# Patient Record
Sex: MALE | Race: WHITE | NOT HISPANIC OR LATINO | Employment: OTHER | URBAN - NONMETROPOLITAN AREA
[De-identification: names, ages, dates, MRNs, and addresses within clinical notes are randomized per-mention and may not be internally consistent; named-entity substitution may affect disease eponyms.]

---

## 2017-01-26 ENCOUNTER — OFFICE VISIT (OUTPATIENT)
Dept: OTOLARYNGOLOGY | Facility: CLINIC | Age: 67
End: 2017-01-26

## 2017-01-26 VITALS
WEIGHT: 175.38 LBS | TEMPERATURE: 98.5 F | DIASTOLIC BLOOD PRESSURE: 75 MMHG | BODY MASS INDEX: 23.75 KG/M2 | HEART RATE: 64 BPM | HEIGHT: 72 IN | SYSTOLIC BLOOD PRESSURE: 168 MMHG

## 2017-01-26 DIAGNOSIS — L92.9 GRANULATION TISSUE: ICD-10-CM

## 2017-01-26 DIAGNOSIS — H69.83 ETD (EUSTACHIAN TUBE DYSFUNCTION), BILATERAL: Primary | ICD-10-CM

## 2017-01-26 DIAGNOSIS — H90.5 SNHL (SENSORINEURAL HEARING LOSS): ICD-10-CM

## 2017-01-26 DIAGNOSIS — H65.413 CHRONIC ALLERGIC OTITIS MEDIA OF BOTH EARS: ICD-10-CM

## 2017-01-26 DIAGNOSIS — H65.115 RECURRENT ACUTE ALLERGIC OTITIS MEDIA OF LEFT EAR: ICD-10-CM

## 2017-01-26 PROCEDURE — 99213 OFFICE O/P EST LOW 20 MIN: CPT | Performed by: NURSE PRACTITIONER

## 2017-01-26 RX ORDER — CIPROFLOXACIN AND DEXAMETHASONE 3; 1 MG/ML; MG/ML
3 SUSPENSION/ DROPS AURICULAR (OTIC) 3 TIMES DAILY
Qty: 7.5 ML | Refills: 0 | Status: SHIPPED | OUTPATIENT
Start: 2017-01-26 | End: 2017-02-15

## 2017-01-26 RX ORDER — AMOXICILLIN AND CLAVULANATE POTASSIUM 875; 125 MG/1; MG/1
1 TABLET, FILM COATED ORAL 2 TIMES DAILY
Qty: 20 TABLET | Refills: 0 | Status: SHIPPED | OUTPATIENT
Start: 2017-01-26 | End: 2017-02-05

## 2017-01-26 RX ORDER — CHOLECALCIFEROL (VITAMIN D3) 50 MCG
1 CAPSULE ORAL DAILY
COMMUNITY
End: 2020-07-15 | Stop reason: HOSPADM

## 2017-01-26 RX ORDER — ACETAMINOPHEN, ASPIRIN AND CAFFEINE 250; 250; 65 MG/1; MG/1; MG/1
TABLET, FILM COATED ORAL EVERY 8 HOURS
COMMUNITY
End: 2017-03-02

## 2017-01-26 RX ORDER — CETIRIZINE HYDROCHLORIDE 10 MG/1
TABLET ORAL
COMMUNITY
End: 2020-05-12

## 2017-01-26 RX ORDER — VALSARTAN 80 MG/1
160 TABLET ORAL DAILY
COMMUNITY
End: 2020-06-19

## 2017-01-26 RX ORDER — CLONIDINE HYDROCHLORIDE 0.2 MG/1
0.2 TABLET ORAL 3 TIMES DAILY
COMMUNITY
End: 2020-10-02

## 2017-01-26 RX ORDER — FLUTICASONE PROPIONATE 50 MCG
SPRAY, SUSPENSION (ML) NASAL
COMMUNITY
End: 2017-09-25 | Stop reason: SDUPTHER

## 2017-01-26 RX ORDER — ALBUTEROL SULFATE 90 UG/1
2 AEROSOL, METERED RESPIRATORY (INHALATION) 4 TIMES DAILY PRN
COMMUNITY
End: 2020-10-02

## 2017-01-26 RX ORDER — AZELASTINE 1 MG/ML
SPRAY, METERED NASAL 2 TIMES DAILY
COMMUNITY
End: 2017-03-02

## 2017-01-26 RX ORDER — AMLODIPINE BESYLATE 10 MG/1
10 TABLET ORAL DAILY
COMMUNITY
End: 2020-08-04

## 2017-01-26 NOTE — PATIENT INSTRUCTIONS
Medical vs surgical options discussed    Treat with meds.  Followup for possible PET.    Call for problems or worsening symptoms

## 2017-01-26 NOTE — MR AVS SNAPSHOT
Ricardo Hugo   1/26/2017 9:00 AM   Office Visit    Dept Phone:  [phone]   Encounter #:  [id number]    Provider:  STERLING Richardson   Department:  [facility]                Your Full Care Plan              Today's Medication Changes          These changes are accurate as of: 1/26/17  9:32 AM.  If you have any questions, ask your nurse or doctor.               New Medication(s)Ordered:     amoxicillin-clavulanate 875-125 MG per tablet   Commonly known as:  AUGMENTIN   Take 1 tablet by mouth 2 (Two) Times a Day for 10 days.   Started by:  STERLING Richardson       ciprofloxacin-dexamethasone 0.3-0.1 % otic suspension   Commonly known as:  CIPRODEX   Administer 3 drops into the left ear 3 (Three) Times a Day.   Started by:  STERLING Richardson            Where to Get Your Medications      These medications were sent to [facility] [address] [place] [address], [place] - [address] AT [facility] [address] - [phone] [facility] [phone]  [address], [place] [address]    Hours:  24-hours Phone:  [phone]     amoxicillin-clavulanate 875-125 MG per tablet    ciprofloxacin-dexamethasone 0.3-0.1 % otic suspension                  Your Updated Medication List          This list is accurate as of: 1/26/17  9:32 AM.  Always use your most recent med list.                albuterol 108 (90 BASE) MCG/ACT inhaler   Commonly known as:  PROVENTIL HFA;VENTOLIN HFA       amLODIPine 10 MG tablet   Commonly known as:  NORVASC       amoxicillin-clavulanate 875-125 MG per tablet   Commonly known as:  AUGMENTIN   Take 1 tablet by mouth 2 (Two) Times a Day for 10 days.       aspirin-acetaminophen-caffeine 250-250-65 MG per tablet   Commonly known as:  EXCEDRIN MIGRAINE       azelastine 0.1 % nasal spray   Commonly known as:  ASTELIN       cetirizine 10 MG tablet   Commonly known as:  zyrTEC       ciprofloxacin-dexamethasone 0.3-0.1 % otic suspension   Commonly known as:   "CIPRODEX   Administer 3 drops into the left ear 3 (Three) Times a Day.       CloNIDine 0.2 MG tablet   Commonly known as:  CATAPRES       fluticasone 50 MCG/ACT nasal spray   Commonly known as:  FLONASE       magnesium chloride  (64 MG) MG CR tablet   Commonly known as:  MAG-DELAY       MULTI VITAMIN PO       Omeprazole Magnesium 20.6 (20 BASE) MG capsule delayed-release       valsartan 80 MG tablet   Commonly known as:  DIOVAN               Instructions     None    Patient Instructions History      Upcoming Appointments     Visit Type Date Time Department    FOLLOW UP 2017  9:00 AM [facility]    [facility]      MyChart Signup     Our records indicate that you have declined [facility] signup. If you would like to sign up for [facility], please email [email or web address] or call [phone] to obtain an activation code.             Other Info from Your Visit           Your Appointments     Feb 15, 2017  9:45 AM CST   Follow Up with Nathan Chen MD   [facility] (--)    [address] [place] [address]   3 [facility] [address] [place] [id number] [phone]           Arrive 15 minutes prior to appointment.              Allergies     Hydrocodone      Lortab [Hydrocodone-acetaminophen]        Reason for Visit     Ear Problem           Vital Signs     Blood Pressure Pulse Temperature Height Weight Body Mass Index    168/75 64 98.5 °F (36.9 °C) 72\" (182.9 cm) 175 lb 6 oz (79.5 kg) 23.79 kg/m2    Smoking Status                   Former Smoker             "

## 2017-01-26 NOTE — PROGRESS NOTES
YOB: 1950  Location: [facility]  Location Address: [address] [place] [address], [facility], [address] [place] [address]  Location Phone: [phone]    Chief Complaint   Patient presents with   • Ear Problem       History of Present Illness  Ricardo Hugo is a 66 y.o. male.  Ricardo Hugo is here for follow up of ENT complaints. The patient has had problems with otorrhea, chronic fluid on the ear and decreased hearing  The symptoms are localized to the left ear. The patient has had severe symptoms. The symptoms have been present for the last several weeks . The symptoms are aggravated by  no identifiable factors . The symptoms are improved by no identifieable factors. S/p left PET.  He has had a cold. He is on Flonase and Astelin. He has had left ear drainage.  He wants a tube back in his ear asap.       Past Medical History   Diagnosis Date   • Allergic rhinitis    • Chronic mucoid otitis media    • Chronic rhinitis    • Chronic sinusitis    • Eustachian tube dysfunction    • Heart disease    • Hypertension    • Mixed hearing loss of left ear    • Sensorineural hearing loss    • Tinnitus        Past Surgical History   Procedure Laterality Date   • Coronary artery bypass graft     • Total hip arthroplasty     • Myringotomy  06/10/2016   • Tonsillectomy           Current Outpatient Prescriptions:   •  albuterol (PROVENTIL HFA;VENTOLIN HFA) 108 (90 BASE) MCG/ACT inhaler, Every 6 (Six) Hours., Disp: , Rfl:   •  amLODIPine (NORVASC) 10 MG tablet, Take 10 mg by mouth daily, Disp: , Rfl:   •  aspirin-acetaminophen-caffeine (EXCEDRIN MIGRAINE) 250-250-65 MG per tablet, Every 8 (Eight) Hours., Disp: , Rfl:   •  azelastine (ASTELIN) 0.1 % nasal spray, 2 (Two) Times a Day., Disp: , Rfl:   •  cetirizine (zyrTEC) 10 MG tablet, Take 10 mg by mouth daily, Disp: , Rfl:   •  CloNIDine (CATAPRES) 0.2 MG tablet, Take 0.2 mg by mouth 3 times daily, Disp: , Rfl:   •  fluticasone (FLONASE) 50 MCG/ACT nasal spray, 1 spray by Nasal  route daily, Disp: , Rfl:   •  magnesium chloride ER (MAG-DELAY) 535 (64 MG) MG CR tablet, Take 64 mg by mouth daily, Disp: , Rfl:   •  Multiple Vitamin (MULTI VITAMIN PO), Take 5 mg by mouth daily, Disp: , Rfl:   •  Omeprazole Magnesium 20.6 (20 BASE) MG capsule delayed-release, Take by mouth daily, Disp: , Rfl:   •  valsartan (DIOVAN) 80 MG tablet, Take 80 mg by mouth daily, Disp: , Rfl:   •  amoxicillin-clavulanate (AUGMENTIN) 875-125 MG per tablet, Take 1 tablet by mouth 2 (Two) Times a Day for 10 days., Disp: 20 tablet, Rfl: 0  •  ciprofloxacin-dexamethasone (CIPRODEX) 0.3-0.1 % otic suspension, Administer 3 drops into the left ear 3 (Three) Times a Day., Disp: 7.5 mL, Rfl: 0    Hydrocodone and Lortab [hydrocodone-acetaminophen]    Family History   Problem Relation Age of Onset   • Diabetes Other        Social History     Social History   • Marital status:      Spouse name: N/A   • Number of children: N/A   • Years of education: N/A     Occupational History   • Not on file.     Social History Main Topics   • Smoking status: Former Smoker   • Smokeless tobacco: Not on file      Comment: quit 2010   • Alcohol use Yes      Comment: 2 beers daily   • Drug use: Defer   • Sexual activity: Not on file     Other Topics Concern   • Not on file     Social History Narrative       Review of Systems   Constitutional: Negative.    HENT:        SEE HPI   Eyes: Negative.    Respiratory: Negative.    Cardiovascular: Negative.    Gastrointestinal: Negative.    Endocrine: Negative.    Genitourinary: Negative.    Musculoskeletal: Negative.    Skin: Negative.    Allergic/Immunologic: Negative.    Neurological: Negative.    Hematological: Negative.    Psychiatric/Behavioral: Negative.        Vitals:    01/26/17 0910   BP: 168/75   Pulse: 64   Temp: 98.5 °F (36.9 °C)       Objective     Physical Exam  CONSTITUTIONAL: well nourished, alert, oriented, in no acute distress     COMMUNICATION AND VOICE: able to communicate  normally, normal voice quality    HEAD: normocephalic, no lesions, atraumatic, no tenderness, no masses     FACE: appearance normal, no lesions, no tenderness, no deformities, facial motion symmetric    EYES: ocular motility normal, eyelids normal, orbits normal, no proptosis, conjunctiva normal , pupils equal, round     EARS:  Hearing: response to conversational voice normal bilaterally   External Ears: auricles without lesions  Otoscopic: left TM dull with effusion, tympanogram positive pressure, left EAC with extruded tube/debris removed with suction, left EAC with posterior granulation approx 2 mm, right TM dull negative pressure, right EAC normal to inspection    NOSE:  External Nose: structure normal, no tenderness on palpation, no nasal discharge, no lesions, no evidence of trauma, nostrils patent   Intranasal Exam: nasal mucosa normal, vestibule within normal limits, inferior turbinate normal, nasal septum midline     ORAL:  Lips: upper and lower lips without lesion        CHEST/RESPIRATORY: respiratory effort normal  CARDIOVASCULAR: extremities without cyanosis or edema      NEUROLOGIC/PSYCHIATRIC: oriented to time, place and person, mood normal, affect appropriate, CN II-XII intact grossly    Assessment/Plan     * Surgery not found *  No orders of the defined types were placed in this encounter.    Return in about 3 weeks (around 2/16/2017).       Patient Instructions   Medical vs surgical options discussed    Treat with meds.  Followup for possible PET.    Call for problems or worsening symptoms

## 2017-02-15 ENCOUNTER — OFFICE VISIT (OUTPATIENT)
Dept: OTOLARYNGOLOGY | Facility: CLINIC | Age: 67
End: 2017-02-15

## 2017-02-15 VITALS
DIASTOLIC BLOOD PRESSURE: 78 MMHG | BODY MASS INDEX: 23.16 KG/M2 | WEIGHT: 171 LBS | SYSTOLIC BLOOD PRESSURE: 142 MMHG | HEIGHT: 72 IN | HEART RATE: 62 BPM | TEMPERATURE: 98 F

## 2017-02-15 DIAGNOSIS — H69.83 EUSTACHIAN TUBE DYSFUNCTION, BILATERAL: ICD-10-CM

## 2017-02-15 DIAGNOSIS — J31.0 CHRONIC RHINITIS: Primary | ICD-10-CM

## 2017-02-15 PROBLEM — H69.90 EUSTACHIAN TUBE DYSFUNCTION: Status: ACTIVE | Noted: 2017-02-15

## 2017-02-15 PROBLEM — H69.80 EUSTACHIAN TUBE DYSFUNCTION: Status: ACTIVE | Noted: 2017-02-15

## 2017-02-15 PROCEDURE — 99213 OFFICE O/P EST LOW 20 MIN: CPT | Performed by: OTOLARYNGOLOGY

## 2017-02-15 RX ORDER — VIT C/E/CUPERIC/ZINC/LUTEIN 226-90-0.8
1 CAPSULE ORAL 2 TIMES DAILY
COMMUNITY

## 2017-02-15 NOTE — PROGRESS NOTES
PRIMARY CARE PROVIDER: Joe Velasco MD  REFERRING PROVIDER: No ref. provider found    Chief Complaint   Patient presents with   • Follow-up     Left ear pressure/fullness/ringing       Subjective   History of Present Illness:  Ricardo Hugo is a  66 y.o.  male who presents for follow up. He reports improvement of symptoms.     Review of Systems:  Review of Systems    Past History:  Past Medical History   Diagnosis Date   • Allergic rhinitis    • Chronic mucoid otitis media    • Chronic rhinitis    • Chronic sinusitis    • Eustachian tube dysfunction    • Heart disease    • Hypertension    • Mixed hearing loss of left ear    • Sensorineural hearing loss    • Tinnitus      Past Surgical History   Procedure Laterality Date   • Coronary artery bypass graft     • Total hip arthroplasty     • Myringotomy  06/10/2016   • Tonsillectomy       Family History   Problem Relation Age of Onset   • Diabetes Other      Social History   Substance Use Topics   • Smoking status: Former Smoker   • Smokeless tobacco: None      Comment: quit 2010   • Alcohol use Yes      Comment: 2 beers daily       Current Outpatient Prescriptions:   •  albuterol (PROVENTIL HFA;VENTOLIN HFA) 108 (90 BASE) MCG/ACT inhaler, Every 6 (Six) Hours., Disp: , Rfl:   •  amLODIPine (NORVASC) 10 MG tablet, Take 10 mg by mouth daily, Disp: , Rfl:   •  aspirin-acetaminophen-caffeine (EXCEDRIN MIGRAINE) 250-250-65 MG per tablet, Every 8 (Eight) Hours., Disp: , Rfl:   •  azelastine (ASTELIN) 0.1 % nasal spray, 2 (Two) Times a Day., Disp: , Rfl:   •  cetirizine (zyrTEC) 10 MG tablet, Take 10 mg by mouth daily, Disp: , Rfl:   •  CloNIDine (CATAPRES) 0.2 MG tablet, Take 0.2 mg by mouth 3 times daily, Disp: , Rfl:   •  fluticasone (FLONASE) 50 MCG/ACT nasal spray, 1 spray by Nasal route daily, Disp: , Rfl:   •  magnesium chloride ER (MAG-DELAY) 535 (64 MG) MG CR tablet, Take 64 mg by mouth daily, Disp: , Rfl:   •  Multiple Vitamins-Minerals (PRESERVISION/LUTEIN PO),  Take  by mouth., Disp: , Rfl:   •  Omeprazole Magnesium 20.6 (20 BASE) MG capsule delayed-release, Take by mouth daily, Disp: , Rfl:   •  valsartan (DIOVAN) 80 MG tablet, Take 80 mg by mouth daily, Disp: , Rfl:   Allergies:  Hydrocodone and Lortab [hydrocodone-acetaminophen]    Objective     Vital Signs:  Temp:  [98 °F (36.7 °C)] 98 °F (36.7 °C)  Heart Rate:  [62] 62  BP: (142)/(78) 142/78    Physical Exam:  CONSTITUTIONAL: well nourished, well-developed, alert, oriented, in no acute distress   COMMUNICATION AND VOICE: able to communicate normally for age, normal voice quality  HEAD: normocephalic, no lesions, atraumatic, no tenderness, no masses   FACE: appearance normal, no lesions, no tenderness, no deformities, facial motion symmetric  EYES: ocular motility normal, eyelids normal, orbits normal, no proptosis, conjunctiva normal , pupils equal, round   EARS:  Hearing: response to conversational voice normal bilaterally   External Ears: auricles without lesions  Otoscopic Exam:   EXTERNAL CANAL: normal ear canal without stenosis or significant cerumen   TYMPANIC MEMBRANE:  tympanic membrane appearance normal, no lesions, no perforation, normal mobility, no fluid  NOSE:  External Nose: structure normal, no tenderness on palpation, no nasal discharge, no lesions, no evidence of trauma, nostrils patent   Intranasal exam: nasal mucosa with mucosal congestion and erythema, nasal septum relatively midline   ORAL:  Lips: upper and lower lips without lesion   NECK: neck appearance normal  CHEST/RESPIRATORY: respiratory effort normal, normal chest excursion  CARDIOVASCULAR: extremities without cyanosis or edema   NEUROLOGIC/PSYCHIATRIC: oriented appropriately, mood normal, affect appropriate, CN II-XII intact grossly      Results Review:   none    Assessment   No diagnosis found.    Plan   Medical and surgical options were discussed including medical management vs tube placement. Risks, benefits and alternatives were  discussed and questions were answered. After considering the options, the patient decided to proceed with medical management  Continue current management plan.  -------MEDICATIONS:-------  continue previous medication as prescribed  -----INSTRUCTIONS-----  Call if pressure resumes- if so will consider tube placement    No Follow-up on file.    Nathan Chen MD  02/15/17  10:06 AM

## 2017-03-02 ENCOUNTER — APPOINTMENT (OUTPATIENT)
Dept: PREADMISSION TESTING | Facility: HOSPITAL | Age: 67
End: 2017-03-02

## 2017-03-02 VITALS
OXYGEN SATURATION: 99 % | DIASTOLIC BLOOD PRESSURE: 78 MMHG | BODY MASS INDEX: 23.19 KG/M2 | SYSTOLIC BLOOD PRESSURE: 140 MMHG | RESPIRATION RATE: 20 BRPM | HEART RATE: 65 BPM | WEIGHT: 175 LBS | HEIGHT: 73 IN

## 2017-03-02 LAB
ALBUMIN SERPL-MCNC: 4 G/DL (ref 3.5–5)
ALBUMIN/GLOB SERPL: 1.1 G/DL (ref 1.1–2.5)
ALP SERPL-CCNC: 120 U/L (ref 24–120)
ALT SERPL W P-5'-P-CCNC: 19 U/L (ref 0–54)
ANION GAP SERPL CALCULATED.3IONS-SCNC: 9 MMOL/L (ref 4–13)
AST SERPL-CCNC: 28 U/L (ref 7–45)
BASOPHILS # BLD AUTO: 0.03 10*3/MM3 (ref 0–0.2)
BASOPHILS NFR BLD AUTO: 0.3 % (ref 0–2)
BILIRUB SERPL-MCNC: 0.7 MG/DL (ref 0.1–1)
BUN BLD-MCNC: 13 MG/DL (ref 5–21)
BUN/CREAT SERPL: 16.5 (ref 7–25)
CALCIUM SPEC-SCNC: 9.7 MG/DL (ref 8.4–10.4)
CHLORIDE SERPL-SCNC: 101 MMOL/L (ref 98–110)
CO2 SERPL-SCNC: 28 MMOL/L (ref 24–31)
CREAT BLD-MCNC: 0.79 MG/DL (ref 0.5–1.4)
DEPRECATED RDW RBC AUTO: 44.4 FL (ref 40–54)
EOSINOPHIL # BLD AUTO: 0.27 10*3/MM3 (ref 0–0.7)
EOSINOPHIL NFR BLD AUTO: 3 % (ref 0–4)
ERYTHROCYTE [DISTWIDTH] IN BLOOD BY AUTOMATED COUNT: 12.8 % (ref 12–15)
GFR SERPL CREATININE-BSD FRML MDRD: 97 ML/MIN/1.73
GLOBULIN UR ELPH-MCNC: 3.5 GM/DL
GLUCOSE BLD-MCNC: 103 MG/DL (ref 70–100)
HCT VFR BLD AUTO: 39 % (ref 40–52)
HGB BLD-MCNC: 13.6 G/DL (ref 14–18)
IMM GRANULOCYTES # BLD: 0.03 10*3/MM3 (ref 0–0.03)
IMM GRANULOCYTES NFR BLD: 0.3 % (ref 0–5)
LYMPHOCYTES # BLD AUTO: 1.31 10*3/MM3 (ref 0.72–4.86)
LYMPHOCYTES NFR BLD AUTO: 14.5 % (ref 15–45)
MCH RBC QN AUTO: 33.3 PG (ref 28–32)
MCHC RBC AUTO-ENTMCNC: 34.9 G/DL (ref 33–36)
MCV RBC AUTO: 95.4 FL (ref 82–95)
MONOCYTES # BLD AUTO: 0.97 10*3/MM3 (ref 0.19–1.3)
MONOCYTES NFR BLD AUTO: 10.7 % (ref 4–12)
NEUTROPHILS # BLD AUTO: 6.43 10*3/MM3 (ref 1.87–8.4)
NEUTROPHILS NFR BLD AUTO: 71.2 % (ref 39–78)
NRBC BLD MANUAL-RTO: 0 /100 WBC (ref 0–0)
PLATELET # BLD AUTO: 122 10*3/MM3 (ref 130–400)
PMV BLD AUTO: 10.2 FL (ref 6–12)
POTASSIUM BLD-SCNC: 4.8 MMOL/L (ref 3.5–5.3)
PROT SERPL-MCNC: 7.5 G/DL (ref 6.3–8.7)
RBC # BLD AUTO: 4.09 10*6/MM3 (ref 4.8–5.9)
SODIUM BLD-SCNC: 138 MMOL/L (ref 135–145)
WBC NRBC COR # BLD: 9.04 10*3/MM3 (ref 4.8–10.8)

## 2017-03-02 PROCEDURE — 85025 COMPLETE CBC W/AUTO DIFF WBC: CPT | Performed by: SPECIALIST

## 2017-03-02 PROCEDURE — 36415 COLL VENOUS BLD VENIPUNCTURE: CPT

## 2017-03-02 PROCEDURE — 93010 ELECTROCARDIOGRAM REPORT: CPT | Performed by: INTERNAL MEDICINE

## 2017-03-02 PROCEDURE — 93005 ELECTROCARDIOGRAM TRACING: CPT

## 2017-03-02 PROCEDURE — 80053 COMPREHEN METABOLIC PANEL: CPT | Performed by: SPECIALIST

## 2017-03-02 NOTE — DISCHARGE INSTRUCTIONS
DAY OF SURGERY INSTRUCTIONS        YOUR SURGEON: ***APRIL SUSAN    PROCEDURE: ***JEET ANAL ABSCESS    DATE OF SURGERY: ***03/03/2017    ARRIVAL TIME: AS DIRECTED BY OFFICE    DAY OF SURGERY TAKE ONLY THESE MEDICATIONS: ***NORVASC            BEFORE YOU COME TO THE HOSPITAL  (Pre-op instructions)  • Do not eat, drink, smoke or chew gum after midnight the night before surgery.  This also includes no mints.  • Morning of surgery take only the medicines you have been instructed with a sip of water unless otherwise instructed  by your physician.  • Do not shave, wear makeup or dark nail polish.  • Remove all jewelry including rings.  • Leave anything you consider valuable at home.  • Leave your suitcase in the car until after your surgery.  • Bring the following with you if applicable:  o Picture ID and insurance, Medicare or Medicaid cards  o Co-pay/deductible required by insurance (cash, check, credit card)  o Medications (no narcotics) in original bottles (not a list) including all over-the-counter medications.  o Copy of advance directive, living will or power-of- documents if not brought to PAT  o CPAP or BIPAP mask and tubing  o Skin prep instruction sheet  o Relaxation aids (MP3 player, book, magazine)  • Confirm your arrival time with you surgeon the day before your surgery (surgery times are subject to change)  • On the day of surgery check in at registration located at the main entrance of the hospital.       Outpatient Surgery Guidelines, Adult  Outpatient procedures are those for which the person having the procedure is allowed to go home the same day as the procedure. Various procedures are done on an outpatient basis. You should follow some general guidelines if you will be having an outpatient procedure.  LET YOUR HEALTH CARE PROVIDER KNOW ABOUT:  · Any allergies you have.  · All medicines you are taking, including vitamins, herbs, eye drops, creams, and over-the-counter medicines.  · Previous  problems you or members of your family have had with the use of anesthetics.  · Any blood disorders you have.  · Previous surgeries you have had.  · Medical conditions you have.  RISKS AND COMPLICATIONS  Your health care provider will discuss possible risks and complications with you before surgery. Common risks and complications include:    · Problems due to the use of anesthetics.  · Blood loss and replacement (does not apply to minor surgical procedures).  · Temporary increase in pain due to surgery.  · Uncorrected pain or problems that the surgery was meant to correct.  · Infection.  · New damage.  BEFORE THE PROCEDURE  · Ask your health care provider about changing or stopping your regular medicines. You may need to stop taking certain medicines in the days or weeks before the procedure.  · Stop smoking at least 2 weeks before surgery. This lowers your risk for complications during and after surgery. Ask your health care provider for help with this if needed.  · Eat your usual meals and a light supper the day before surgery. Continue fluid intake. Do not drink alcohol.  · Do not eat or drink after midnight the night before your surgery.   · Arrange for someone to take you home and to stay with you for 24 hours after the procedure. Medicine given for your procedure may affect your ability to drive or to care for yourself.  · Call your health care provider's office if you develop an illness or problem that may prevent you from safely having your procedure.  AFTER THE PROCEDURE  After surgery, you will be taken to a recovery area, where your progress will be monitored. If there are no complications, you will be allowed to go home when you are awake, stable, and taking fluids well. You may have numbness around the surgical site. Healing will take some time. You will have tenderness at the surgical site and may have some swelling and bruising. You may also have some nausea.  HOME CARE INSTRUCTIONS  · Do not drive  for 24 hours, or as directed by your health care provider. Do not drive while taking prescription pain medicines.  · Do not drink alcohol for 24 hours.  · Do not make important decisions or sign legal documents for 24 hours.  · You may resume a normal diet and activities as directed.  · Do not lift anything heavier than 10 pounds (4.5 kg) or play contact sports until your health care provider says it is okay.  · Change your bandages (dressings) as directed.  · Only take over-the-counter or prescription medicines as directed by your health care provider.  · Follow up with your health care provider as directed.  SEEK MEDICAL CARE IF:  · You have increased bleeding (more than a small spot) from the surgical site.  · You have redness, swelling, or increasing pain in the wound.  · You see pus coming from the wound.  · You have a fever.  · You notice a bad smell coming from the wound or dressing.  · You feel lightheaded or faint.  · You develop a rash.  · You have trouble breathing.  · You develop allergies.  MAKE SURE YOU:  · Understand these instructions.  · Will watch your condition.  · Will get help right away if you are not doing well or get worse.     This information is not intended to replace advice given to you by your health care provider. Make sure you discuss any questions you have with your health care provider.     Document Released: 09/12/2002 Document Revised: 05/03/2016 Document Reviewed: 05/22/2014  [employer] Interactive Patient Education ©2016 [employer]       Fall Prevention in Hospitals, Adult  As a hospital patient, your condition and the treatments you receive can increase your risk for falls. Some additional risk factors for falls in a hospital include:  · Being in an unfamiliar environment.  · Being on bed rest.  · Your surgery.  · Taking certain medicines.  · Your tubing requirements, such as intravenous (IV) therapy or catheters.  It is important that you learn how to decrease fall risks while  at the hospital. Below are important tips that can help prevent falls.  SAFETY TIPS FOR PREVENTING FALLS  Talk about your risk of falling.  · Ask your health care provider why you are at risk for falling. Is it your medicine, illness, tubing placement, or something else?  · Make a plan with your health care provider to keep you safe from falls.  · Ask your health care provider or pharmacist about side effects of your medicines. Some medicines can make you dizzy or affect your coordination.  Ask for help.  · Ask for help before getting out of bed. You may need to press your call button.  · Ask for assistance in getting safely to the toilet.  · Ask for a walker or cane to be put at your bedside. Ask that most of the side rails on your bed be placed up before your health care provider leaves the room.  · Ask family or friends to sit with you.  · Ask for things that are out of your reach, such as your glasses, hearing aids, telephone, bedside table, or call button.  Follow these tips to avoid falling:  · Stay lying or seated, rather than standing, while waiting for help.  · Wear rubber-soled slippers or shoes whenever you walk in the hospital.  · Avoid quick, sudden movements.  ¨ Change positions slowly.  ¨ Sit on the side of your bed before standing.  ¨ Stand up slowly and wait before you start to walk.  · Let your health care provider know if there is a spill on the floor.  · Pay careful attention to the medical equipment, electrical cords, and tubes around you.  · When you need help, use your call button by your bed or in the bathroom. Wait for one of your health care providers to help you.  · If you feel dizzy or unsure of your footing, return to bed and wait for assistance.  · Avoid being distracted by the TV, telephone, or another person in your room.  · Do not lean or support yourself on rolling objects, such as IV poles or bedside tables.     This information is not intended to replace advice given to you by  your health care provider. Make sure you discuss any questions you have with your health care provider.     Document Released: 12/15/2001 Document Revised: 01/08/2016 Document Reviewed: 08/25/2013  [employer] Interactive Patient Education ©2016 [employer]       Surgical Site Infections FAQs  What is a Surgical Site Infection (SSI)?  A surgical site infection is an infection that occurs after surgery in the part of the body where the surgery took place. Most patients who have surgery do not develop an infection. However, infections develop in about 1 to 3 out of every 100 patients who have surgery.  Some of the common symptoms of a surgical site infection are:  · Redness and pain around the area where you had surgery  · Drainage of cloudy fluid from your surgical wound  · Fever  Can SSIs be treated?  Yes. Most surgical site infections can be treated with antibiotics. The antibiotic given to you depends on the bacteria (germs) causing the infection. Sometimes patients with SSIs also need another surgery to treat the infection.  What are some of the things that hospitals are doing to prevent SSIs?  To prevent SSIs, doctors, nurses, and other healthcare providers:  · Clean their hands and arms up to their elbows with an antiseptic agent just before the surgery.  · Clean their hands with soap and water or an alcohol-based hand rub before and after caring for each patient.  · May remove some of your hair immediately before your surgery using electric clippers if the hair is in the same area where the procedure will occur. They should not shave you with a razor.  · Wear special hair covers, masks, gowns, and gloves during surgery to keep the surgery area clean.  · Give you antibiotics before your surgery starts. In most cases, you should get antibiotics within 60 minutes before the surgery starts and the antibiotics should be stopped within 24 hours after surgery.  · Clean the skin at the site of your surgery with a  special soap that kills germs.  What can I do to help prevent SSIs?  Before your surgery:  · Tell your doctor about other medical problems you may have. Health problems such as allergies, diabetes, and obesity could affect your surgery and your treatment.  · Quit smoking. Patients who smoke get more infections. Talk to your doctor about how you can quit before your surgery.  · Do not shave near where you will have surgery. Shaving with a razor can irritate your skin and make it easier to develop an infection.  At the time of your surgery:  · Speak up if someone tries to shave you with a razor before surgery. Ask why you need to be shaved and talk with your surgeon if you have any concerns.  · Ask if you will get antibiotics before surgery.  After your surgery:  · Make sure that your healthcare providers clean their hands before examining you, either with soap and water or an alcohol-based hand rub.  · If you do not see your providers clean their hands, please ask them to do so.  · Family and friends who visit you should not touch the surgical wound or dressings.  · Family and friends should clean their hands with soap and water or an alcohol-based hand rub before and after visiting you. If you do not see them clean their hands, ask them to clean their hands.  What do I need to do when I go home from the hospital?  · Before you go home, your doctor or nurse should explain everything you need to know about taking care of your wound. Make sure you understand how to care for your wound before you leave the hospital.  · Always clean your hands before and after caring for your wound.  · Before you go home, make sure you know who to contact if you have questions or problems after you get home.  · If you have any symptoms of an infection, such as redness and pain at the surgery site, drainage, or fever, call your doctor immediately.  If you have additional questions, please ask your doctor or nurse.  Developed and  co-sponsored by The Society for Healthcare Epidemiology of [place] (SHEA); Infectious Diseases Society of [place] (IDSA); American Hospital Association; Association for Professionals in Infection Control and Epidemiology (APIC); Centers for Disease Control and Prevention (CDC); and The Joint Commission.     This information is not intended to replace advice given to you by your health care provider. Make sure you discuss any questions you have with your health care provider.     Document Released: 12/23/2014 Document Revised: 01/08/2016 Document Reviewed: 03/02/2016  [employer] Interactive Patient Education ©2016 [employer]     PATIENT/FAMILY/RESPONSIBLE PARTY VERBALIZES UNDERSTANDING OF ABOVE EDUCATION

## 2017-03-03 ENCOUNTER — ANESTHESIA EVENT (OUTPATIENT)
Dept: PERIOP | Facility: HOSPITAL | Age: 67
End: 2017-03-03

## 2017-03-03 ENCOUNTER — HOSPITAL ENCOUNTER (OUTPATIENT)
Facility: HOSPITAL | Age: 67
Setting detail: HOSPITAL OUTPATIENT SURGERY
Discharge: HOME | End: 2017-03-03
Attending: SPECIALIST | Admitting: SPECIALIST

## 2017-03-03 ENCOUNTER — ANESTHESIA (OUTPATIENT)
Dept: PERIOP | Facility: HOSPITAL | Age: 67
End: 2017-03-03

## 2017-03-03 VITALS
DIASTOLIC BLOOD PRESSURE: 80 MMHG | SYSTOLIC BLOOD PRESSURE: 107 MMHG | HEART RATE: 63 BPM | OXYGEN SATURATION: 98 % | TEMPERATURE: 97.2 F | RESPIRATION RATE: 16 BRPM

## 2017-03-03 PROCEDURE — 25010000002 SUCCINYLCHOLINE PER 20 MG: Performed by: NURSE ANESTHETIST, CERTIFIED REGISTERED

## 2017-03-03 PROCEDURE — 25010000002 DEXAMETHASONE PER 1 MG: Performed by: NURSE ANESTHETIST, CERTIFIED REGISTERED

## 2017-03-03 PROCEDURE — 25010000002 FENTANYL CITRATE (PF) 250 MCG/5ML SOLUTION: Performed by: NURSE ANESTHETIST, CERTIFIED REGISTERED

## 2017-03-03 PROCEDURE — 25010000002 MIDAZOLAM PER 1 MG: Performed by: ANESTHESIOLOGY

## 2017-03-03 PROCEDURE — 25010000002 ONDANSETRON PER 1 MG: Performed by: NURSE ANESTHETIST, CERTIFIED REGISTERED

## 2017-03-03 PROCEDURE — 25010000002 PROPOFOL 10 MG/ML EMULSION: Performed by: NURSE ANESTHETIST, CERTIFIED REGISTERED

## 2017-03-03 RX ORDER — ULTRASOUND COUPLING MEDIUM
GEL (GRAM) TOPICAL AS NEEDED
Status: DISCONTINUED | OUTPATIENT
Start: 2017-03-03 | End: 2017-03-03 | Stop reason: HOSPADM

## 2017-03-03 RX ORDER — IPRATROPIUM BROMIDE AND ALBUTEROL SULFATE 2.5; .5 MG/3ML; MG/3ML
3 SOLUTION RESPIRATORY (INHALATION) ONCE AS NEEDED
Status: DISCONTINUED | OUTPATIENT
Start: 2017-03-03 | End: 2017-03-03 | Stop reason: HOSPADM

## 2017-03-03 RX ORDER — SODIUM CHLORIDE 0.9 % (FLUSH) 0.9 %
1-10 SYRINGE (ML) INJECTION AS NEEDED
Status: DISCONTINUED | OUTPATIENT
Start: 2017-03-03 | End: 2017-03-03 | Stop reason: HOSPADM

## 2017-03-03 RX ORDER — DEXAMETHASONE SODIUM PHOSPHATE 4 MG/ML
INJECTION, SOLUTION INTRA-ARTICULAR; INTRALESIONAL; INTRAMUSCULAR; INTRAVENOUS; SOFT TISSUE AS NEEDED
Status: DISCONTINUED | OUTPATIENT
Start: 2017-03-03 | End: 2017-03-03 | Stop reason: SURG

## 2017-03-03 RX ORDER — MIDAZOLAM HYDROCHLORIDE 1 MG/ML
2 INJECTION INTRAMUSCULAR; INTRAVENOUS
Status: DISCONTINUED | OUTPATIENT
Start: 2017-03-03 | End: 2017-03-03 | Stop reason: HOSPADM

## 2017-03-03 RX ORDER — BUPIVACAINE HYDROCHLORIDE AND EPINEPHRINE 2.5; 5 MG/ML; UG/ML
INJECTION, SOLUTION INFILTRATION; PERINEURAL AS NEEDED
Status: DISCONTINUED | OUTPATIENT
Start: 2017-03-03 | End: 2017-03-03 | Stop reason: HOSPADM

## 2017-03-03 RX ORDER — LIDOCAINE HYDROCHLORIDE 20 MG/ML
INJECTION, SOLUTION INFILTRATION; PERINEURAL AS NEEDED
Status: DISCONTINUED | OUTPATIENT
Start: 2017-03-03 | End: 2017-03-03 | Stop reason: SURG

## 2017-03-03 RX ORDER — OXYCODONE HYDROCHLORIDE AND ACETAMINOPHEN 5; 325 MG/1; MG/1
1 TABLET ORAL EVERY 4 HOURS PRN
Status: CANCELLED | OUTPATIENT
Start: 2017-03-03 | End: 2017-03-13

## 2017-03-03 RX ORDER — ONDANSETRON 2 MG/ML
4 INJECTION INTRAMUSCULAR; INTRAVENOUS AS NEEDED
Status: DISCONTINUED | OUTPATIENT
Start: 2017-03-03 | End: 2017-03-03 | Stop reason: HOSPADM

## 2017-03-03 RX ORDER — METOCLOPRAMIDE HYDROCHLORIDE 5 MG/ML
5 INJECTION INTRAMUSCULAR; INTRAVENOUS
Status: DISCONTINUED | OUTPATIENT
Start: 2017-03-03 | End: 2017-03-03 | Stop reason: HOSPADM

## 2017-03-03 RX ORDER — NALOXONE HCL 0.4 MG/ML
0.04 VIAL (ML) INJECTION AS NEEDED
Status: DISCONTINUED | OUTPATIENT
Start: 2017-03-03 | End: 2017-03-03 | Stop reason: HOSPADM

## 2017-03-03 RX ORDER — MEPERIDINE HYDROCHLORIDE 25 MG/ML
12.5 INJECTION INTRAMUSCULAR; INTRAVENOUS; SUBCUTANEOUS
Status: DISCONTINUED | OUTPATIENT
Start: 2017-03-03 | End: 2017-03-03 | Stop reason: HOSPADM

## 2017-03-03 RX ORDER — SUCCINYLCHOLINE CHLORIDE 20 MG/ML
INJECTION INTRAMUSCULAR; INTRAVENOUS AS NEEDED
Status: DISCONTINUED | OUTPATIENT
Start: 2017-03-03 | End: 2017-03-03 | Stop reason: SURG

## 2017-03-03 RX ORDER — MAGNESIUM HYDROXIDE 1200 MG/15ML
LIQUID ORAL AS NEEDED
Status: DISCONTINUED | OUTPATIENT
Start: 2017-03-03 | End: 2017-03-03 | Stop reason: HOSPADM

## 2017-03-03 RX ORDER — SODIUM CHLORIDE, SODIUM LACTATE, POTASSIUM CHLORIDE, CALCIUM CHLORIDE 600; 310; 30; 20 MG/100ML; MG/100ML; MG/100ML; MG/100ML
100 INJECTION, SOLUTION INTRAVENOUS CONTINUOUS
Status: DISCONTINUED | OUTPATIENT
Start: 2017-03-03 | End: 2017-03-03 | Stop reason: HOSPADM

## 2017-03-03 RX ORDER — ROCURONIUM BROMIDE 10 MG/ML
INJECTION, SOLUTION INTRAVENOUS AS NEEDED
Status: DISCONTINUED | OUTPATIENT
Start: 2017-03-03 | End: 2017-03-03 | Stop reason: SURG

## 2017-03-03 RX ORDER — MIDAZOLAM HYDROCHLORIDE 1 MG/ML
1 INJECTION INTRAMUSCULAR; INTRAVENOUS
Status: DISCONTINUED | OUTPATIENT
Start: 2017-03-03 | End: 2017-03-03 | Stop reason: HOSPADM

## 2017-03-03 RX ORDER — ONDANSETRON 2 MG/ML
INJECTION INTRAMUSCULAR; INTRAVENOUS AS NEEDED
Status: DISCONTINUED | OUTPATIENT
Start: 2017-03-03 | End: 2017-03-03 | Stop reason: SURG

## 2017-03-03 RX ORDER — MORPHINE SULFATE 2 MG/ML
2 INJECTION, SOLUTION INTRAMUSCULAR; INTRAVENOUS AS NEEDED
Status: DISCONTINUED | OUTPATIENT
Start: 2017-03-03 | End: 2017-03-03 | Stop reason: HOSPADM

## 2017-03-03 RX ORDER — PROPOFOL 10 MG/ML
VIAL (ML) INTRAVENOUS AS NEEDED
Status: DISCONTINUED | OUTPATIENT
Start: 2017-03-03 | End: 2017-03-03 | Stop reason: SURG

## 2017-03-03 RX ORDER — LABETALOL HYDROCHLORIDE 5 MG/ML
5 INJECTION, SOLUTION INTRAVENOUS
Status: DISCONTINUED | OUTPATIENT
Start: 2017-03-03 | End: 2017-03-03 | Stop reason: HOSPADM

## 2017-03-03 RX ORDER — HYDRALAZINE HYDROCHLORIDE 20 MG/ML
5 INJECTION INTRAMUSCULAR; INTRAVENOUS
Status: DISCONTINUED | OUTPATIENT
Start: 2017-03-03 | End: 2017-03-03 | Stop reason: HOSPADM

## 2017-03-03 RX ORDER — FENTANYL CITRATE 50 UG/ML
INJECTION, SOLUTION INTRAMUSCULAR; INTRAVENOUS AS NEEDED
Status: DISCONTINUED | OUTPATIENT
Start: 2017-03-03 | End: 2017-03-03 | Stop reason: SURG

## 2017-03-03 RX ORDER — LIDOCAINE HYDROCHLORIDE 40 MG/ML
SOLUTION TOPICAL AS NEEDED
Status: DISCONTINUED | OUTPATIENT
Start: 2017-03-03 | End: 2017-03-03 | Stop reason: SURG

## 2017-03-03 RX ORDER — FLUMAZENIL 0.1 MG/ML
0.2 INJECTION INTRAVENOUS AS NEEDED
Status: DISCONTINUED | OUTPATIENT
Start: 2017-03-03 | End: 2017-03-03 | Stop reason: HOSPADM

## 2017-03-03 RX ADMIN — ROCURONIUM BROMIDE 5 MG: 10 INJECTION INTRAVENOUS at 09:43

## 2017-03-03 RX ADMIN — SODIUM CHLORIDE, POTASSIUM CHLORIDE, SODIUM LACTATE AND CALCIUM CHLORIDE 100 ML/HR: 600; 310; 30; 20 INJECTION, SOLUTION INTRAVENOUS at 09:01

## 2017-03-03 RX ADMIN — LIDOCAINE HYDROCHLORIDE 1 EACH: 40 SOLUTION TOPICAL at 09:43

## 2017-03-03 RX ADMIN — MIDAZOLAM HYDROCHLORIDE 1 MG: 1 INJECTION, SOLUTION INTRAMUSCULAR; INTRAVENOUS at 09:13

## 2017-03-03 RX ADMIN — SUCCINYLCHOLINE CHLORIDE 140 MG: 20 INJECTION, SOLUTION INTRAMUSCULAR; INTRAVENOUS at 09:43

## 2017-03-03 RX ADMIN — LIDOCAINE HYDROCHLORIDE 80 MG: 20 INJECTION, SOLUTION INFILTRATION; PERINEURAL at 09:43

## 2017-03-03 RX ADMIN — PROPOFOL 180 MG: 10 INJECTION, EMULSION INTRAVENOUS at 09:43

## 2017-03-03 RX ADMIN — MIDAZOLAM HYDROCHLORIDE 1 MG: 1 INJECTION, SOLUTION INTRAMUSCULAR; INTRAVENOUS at 09:01

## 2017-03-03 RX ADMIN — ONDANSETRON HYDROCHLORIDE 4 MG: 2 SOLUTION INTRAMUSCULAR; INTRAVENOUS at 10:03

## 2017-03-03 RX ADMIN — FENTANYL CITRATE 100 MCG: 50 INJECTION INTRAMUSCULAR; INTRAVENOUS at 09:43

## 2017-03-03 RX ADMIN — LIDOCAINE HYDROCHLORIDE 0.5 ML: 10 INJECTION, SOLUTION EPIDURAL; INFILTRATION; INTRACAUDAL; PERINEURAL at 09:01

## 2017-03-03 RX ADMIN — ERTAPENEM SODIUM 1 G: 1 INJECTION, POWDER, LYOPHILIZED, FOR SOLUTION INTRAMUSCULAR; INTRAVENOUS at 09:48

## 2017-03-03 RX ADMIN — DEXAMETHASONE SODIUM PHOSPHATE 4 MG: 4 INJECTION, SOLUTION INTRAMUSCULAR; INTRAVENOUS at 10:03

## 2017-03-03 NOTE — ANESTHESIA PROCEDURE NOTES
Airway  Urgency: elective    Date/Time: 3/3/2017 9:44 AM  Airway not difficult    General Information and Staff    Patient location during procedure: OR    Indications and Patient Condition  Indications for airway management: airway protection    Preoxygenated: yes  Mask difficulty assessment: 1 - vent by mask    Final Airway Details  Final airway type: endotracheal airway      Successful airway: ETT  Cuffed: yes   Successful intubation technique: direct laryngoscopy  Endotracheal tube insertion site: oral  Blade: Jr  Blade size: #4  ETT size: 7.5 mm  Cormack-Lehane Classification: grade I - full view of glottis  Placement verified by: chest auscultation and capnometry   Measured from: lips  ETT to lips (cm): 22  Number of attempts at approach: 1

## 2017-03-03 NOTE — ANESTHESIA POSTPROCEDURE EVALUATION
Patient: Ricardo Hugo    Procedure Summary     Date Anesthesia Start Anesthesia Stop Room / Location    03/03/17 0936 1016  [facility] OR 09 /  [facility]       Procedure Diagnosis Surgeon Provider    INCISION AND DRAINAGE OF JEET ANAL ABSCESS (N/A Perirectal) (JEET ANAL ABSCESS ) MD Ash Hartmann CRNA          Anesthesia Type: general  Last vitals  /63 (03/03/17 1029)    Temp 97.1 °F (36.2 °C) (03/03/17 1014)    Pulse 59 (03/03/17 1029)   Resp 15 (03/03/17 1029)    SpO2 100 % (03/03/17 1029)      Post Anesthesia Care and Evaluation    Patient location during evaluation: PACU  Patient participation: complete - patient participated  Level of consciousness: awake  Pain score: 2  Pain management: adequate  Airway patency: patent  Anesthetic complications: No anesthetic complications  PONV Status: none  Cardiovascular status: acceptable  Respiratory status: acceptable  Hydration status: acceptable

## 2017-03-03 NOTE — DISCHARGE INSTRUCTIONS
General Anesthesia, Adult, Care After  Refer to this sheet in the next few weeks. These instructions provide you with information on caring for yourself after your procedure. Your health care provider may also give you more specific instructions. Your treatment has been planned according to current medical practices, but problems sometimes occur. Call your health care provider if you have any problems or questions after your procedure.  WHAT TO EXPECT AFTER THE PROCEDURE  After the procedure, it is typical to experience:  · Sleepiness.  · Nausea and vomiting.  HOME CARE INSTRUCTIONS  · For the first 24 hours after general anesthesia:  ¨ Have a responsible person with you.  ¨ Do not drive a car. If you are alone, do not take public transportation.  ¨ Do not drink alcohol.  ¨ Do not take medicine that has not been prescribed by your health care provider.  ¨ Do not sign important papers or make important decisions.  ¨ You may resume a normal diet and activities as directed by your health care provider.  · Change bandages (dressings) as directed.  · If you have questions or problems that seem related to general anesthesia, call the hospital and ask for the anesthetist or anesthesiologist on call.  SEEK MEDICAL CARE IF:  · You have nausea and vomiting that continue the day after anesthesia.  · You develop a rash.  SEEK IMMEDIATE MEDICAL CARE IF:    · You have difficulty breathing.  · You have chest pain.  · You have any allergic problems.     This information is not intended to replace advice given to you by your health care provider. Make sure you discuss any questions you have with your health care provider.     Document Released: 03/26/2002 Document Revised: 01/08/2016 Document Reviewed: 07/03/2014  [employer] Interactive Patient Education ©2016 [employer]    CALL YOUR PHYSICIAN IF YOU EXPERIENCE  INCREASED PAIN NOT HELPED BY YOUR PAIN MEDICATION.    IF YOU HAVE QUESTIONS OR CONCERNS AFTER YOU ARE DISCHARGED CALL  THE NURSE AT THE [facility] LINE [phone].            Fall Prevention in the Home      Falls can cause injuries. They can happen to people of all ages. There are many things you can do to make your home safe and to help prevent falls.    WHAT CAN I DO ON THE OUTSIDE OF MY HOME?  · Regularly fix the edges of walkways and driveways and fix any cracks.  · Remove anything that might make you trip as you walk through a door, such as a raised step or threshold.  · Trim any bushes or trees on the path to your home.  · Use bright outdoor lighting.  · Clear any walking paths of anything that might make someone trip, such as rocks or tools.  · Regularly check to see if handrails are loose or broken. Make sure that both sides of any steps have handrails.  · Any raised decks and porches should have guardrails on the edges.  · Have any leaves, snow, or ice cleared regularly.  · Use sand or salt on walking paths during winter.  · Clean up any spills in your garage right away. This includes oil or grease spills.  WHAT CAN I DO IN THE BATHROOM?    · Use night lights.  · Install grab bars by the toilet and in the tub and shower. Do not use towel bars as grab bars.  · Use non-skid mats or decals in the tub or shower.  · If you need to sit down in the shower, use a plastic, non-slip stool.  · Keep the floor dry. Clean up any water that spills on the floor as soon as it happens.  · Remove soap buildup in the tub or shower regularly.  · Attach bath mats securely with double-sided non-slip rug tape.  · Do not have throw rugs and other things on the floor that can make you trip.  WHAT CAN I DO IN THE BEDROOM?  · Use night lights.  · Make sure that you have a light by your bed that is easy to reach.  · Do not use any sheets or blankets that are too big for your bed. They should not hang down onto the floor.  · Have a firm chair that has side arms. You can use this for support while you get dressed.  · Do not have throw rugs and  other things on the floor that can make you trip.  WHAT CAN I DO IN THE KITCHEN?  · Clean up any spills right away.  · Avoid walking on wet floors.  · Keep items that you use a lot in easy-to-reach places.  · If you need to reach something above you, use a strong step stool that has a grab bar.  · Keep electrical cords out of the way.  · Do not use floor polish or wax that makes floors slippery. If you must use wax, use non-skid floor wax.  · Do not have throw rugs and other things on the floor that can make you trip.  WHAT CAN I DO WITH MY STAIRS?  · Do not leave any items on the stairs.  · Make sure that there are handrails on both sides of the stairs and use them. Fix handrails that are broken or loose. Make sure that handrails are as long as the stairways.  · Check any carpeting to make sure that it is firmly attached to the stairs. Fix any carpet that is loose or worn.  · Avoid having throw rugs at the top or bottom of the stairs. If you do have throw rugs, attach them to the floor with carpet tape.  · Make sure that you have a light switch at the top of the stairs and the bottom of the stairs. If you do not have them, ask someone to add them for you.  WHAT ELSE CAN I DO TO HELP PREVENT FALLS?  · Wear shoes that:  ¨ Do not have high heels.  ¨ Have rubber bottoms.  ¨ Are comfortable and fit you well.  ¨ Are closed at the toe. Do not wear sandals.  · If you use a stepladder:  ¨ Make sure that it is fully opened. Do not climb a closed stepladder.  ¨ Make sure that both sides of the stepladder are locked into place.  ¨ Ask someone to hold it for you, if possible.  · Clearly nic and make sure that you can see:  ¨ Any grab bars or handrails.  ¨ First and last steps.  ¨ Where the edge of each step is.  · Use tools that help you move around (mobility aids) if they are needed. These include:  ¨ Canes.  ¨ Walkers.  ¨ Scooters.  ¨ Crutches.  · Turn on the lights when you go into a dark area. Replace any light bulbs as  soon as they burn out.  · Set up your furniture so you have a clear path. Avoid moving your furniture around.  · If any of your floors are uneven, fix them.  · If there are any pets around you, be aware of where they are.  · Review your medicines with your doctor. Some medicines can make you feel dizzy. This can increase your chance of falling.  Ask your doctor what other things that you can do to help prevent falls.     This information is not intended to replace advice given to you by your health care provider. Make sure you discuss any questions you have with your health care provider.     Document Released: 10/14/2010 Document Revised: 05/03/2016 Document Reviewed: 01/22/2016  [employer] Interactive Patient Education ©2016 [employer]     PATIENT/FAMILY/RESPONSIBLE PARTY VERBALIZES UNDERSTANDING OF ABOVE EDUCATION

## 2017-03-03 NOTE — ANESTHESIA PREPROCEDURE EVALUATION
Anesthesia Evaluation     Patient summary reviewed   NPO Status: > 8 hours   Airway   Mallampati: I  TM distance: >3 FB  no difficulty expected  Dental    (+) edentulous    Pulmonary - normal exam   (+) asthma,   Cardiovascular - normal exam    (+) hypertension well controlled, CABG > 6 Months,       Neuro/Psych  GI/Hepatic/Renal/Endo    (+)  GERD well controlled,     Musculoskeletal     Abdominal  - normal exam   Substance History      OB/GYN          Other                                    Anesthesia Plan    ASA 3     general     intravenous induction   Anesthetic plan and risks discussed with patient.    Plan discussed with CRNA.

## 2017-03-03 NOTE — PLAN OF CARE
Problem: Patient Care Overview (Adult)  Goal: Plan of Care Review  Outcome: Outcome(s) achieved Date Met:  03/03/17 03/03/17 1359   Coping/Psychosocial Response Interventions   Plan Of Care Reviewed With patient;spouse   Patient Care Overview   Progress improving   Outcome Evaluation   Outcome Summary/Follow up Plan states understands dsg changes,meets criteria,ready for d/c         Problem: Perioperative Period (Adult)  Goal: Signs and Symptoms of Listed Potential Problems Will be Absent or Manageable (Perioperative Period)  Outcome: Outcome(s) achieved Date Met:  03/03/17

## 2017-03-03 NOTE — H&P
Lynette Smith MD [facility] History and Physical     Referring Provider: Lynette Smith MD    Patient Care Team:  Joe Velasco MD as PCP - General (Internal Medicine)    Chief complaint anal abscess    Subjective .     History of present illness:  The patient is a 67 y.o. male who presents with recurrent perianal abscess for incision and drainage.  He denies any fevesrs, chills, nausea, vomiting, change in bowel habits, or discharge.    Review of Systems    Review of Systems - General ROS: negative  ENT ROS: negative  Respiratory ROS: no cough, shortness of breath, or wheezing  Cardiovascular ROS: no chest pain or dyspnea on exertion  Gastrointestinal ROS: no abdominal pain, change in bowel habits, or black or bloody stools  Genito-Urinary ROS: no dysuria, trouble voiding, or hematuria  Dermatological ROS: negative   Breast ROS: negative for breast lumps  Hematological and Lymphatic ROS: negative  Musculoskeletal ROS: negative   Neurological ROS: no TIA or stroke symptoms    Psychological ROS: negative  Endocrine ROS: negative    History  Past Medical History   Diagnosis Date   • Acid reflux    • Allergic rhinitis    • Chronic mucoid otitis media    • Chronic rhinitis    • Chronic sinusitis    • Coronary artery disease      HEART BYPASS 2004   • Eustachian tube dysfunction    • Heart disease    • Hypertension    • Mixed hearing loss of left ear    • Perianal abscess    • Sensorineural hearing loss    • Tinnitus    ,   Past Surgical History   Procedure Laterality Date   • Coronary artery bypass graft     • Total hip arthroplasty     • Myringotomy  06/10/2016   • Tonsillectomy     ,   Family History   Problem Relation Age of Onset   • Diabetes Other    ,   Social History   Substance Use Topics   • Smoking status: Former Smoker   • Smokeless tobacco: None      Comment: quit 2010   • Alcohol use Yes      Comment: 2 beers daily   ,   Prescriptions Prior to Admission   Medication Sig Dispense Refill Last Dose   •  amLODIPine (NORVASC) 10 MG tablet Take 10 mg by mouth daily   3/2/2017 at 0430   • cetirizine (zyrTEC) 10 MG tablet Take 10 mg by mouth daily   3/2/2017 at 0430   • CloNIDine (CATAPRES) 0.2 MG tablet Take 0.2 mg by mouth 3 times daily   3/3/2017 at 0430   • fluticasone (FLONASE) 50 MCG/ACT nasal spray 1 spray by Nasal route daily   3/2/2017 at 0430   • magnesium chloride ER (MAG-DELAY) 535 (64 MG) MG CR tablet Take 64 mg by mouth daily   3/2/2017 at 0430   • Multiple Vitamins-Minerals (PRESERVISION/LUTEIN PO) Take  by mouth.   3/2/2017 at 0430   • Omeprazole Magnesium 20.6 (20 BASE) MG capsule delayed-release Take by mouth daily   3/2/2017 at 0430   • valsartan (DIOVAN) 80 MG tablet Take 80 mg by mouth daily   3/2/2017 at 0430   • albuterol (PROVENTIL HFA;VENTOLIN HFA) 108 (90 BASE) MCG/ACT inhaler Every 6 (Six) Hours.   3/2/2017 at 1200    and Allergies:  Lortab [hydrocodone-acetaminophen]    Current Facility-Administered Medications:   •  ertapenem (INVanz) 1 g/100 mL 0.9% NS VTB (mbp), 1 g, Intravenous, Once, Lynette Smith MD    Objective     Vital Signs   Temp:  [98.3 °F (36.8 °C)] 98.3 °F (36.8 °C)  Heart Rate:  [58-65] 58  Resp:  [16-20] 16  BP: (129-140)/(64-78) 129/64    Physical Exam:  General appearance - alert, well appearing, and in no distress  Mental status - alert, oriented to person, place, and time  Neck - supple, no significant adenopathy  Chest - clear to auscultation, no wheezes, rales or rhonchi, symmetric air entry  Heart - normal rate, regular rhythm, normal S1, S2, no murmurs, rubs, clicks or gallops  Abdomen - soft, nontender, nondistended, no masses or organomegaly  Rectal - just left of posterior midline fluctuant cavity without discharge  Neurological - alert, oriented, normal speech, no focal findings or movement disorder noted  Musculoskeletal - no joint tenderness, deformity or swelling  Extremities - peripheral pulses normal, no pedal edema, no clubbing or cyanosis    Results  Review:     Lab Results (last 24 hours)     ** No results found for the last 24 hours. **        Imaging Results (last 24 hours)     ** No results found for the last 24 hours. **            Assessment/Plan       Recurrent perianal abscess.  He will undergo incision and drainage.  The risks, benefits, complications, and possible alternatives were discussed with the patient who agreed to proceed.      Lynette Smith MD  03/03/17  6:37 AM

## 2017-03-03 NOTE — OP NOTE
DATE OF PROCEDURE:  03/03/2017    PREOPERATIVE DIAGNOSIS: Recurrent perianal abscess.      POSTOPERATIVE DIAGNOSIS: Recurrent perianal abscess.     PROCEDURES PERFORMED:   1. Examination under anesthesia.  2. Excision and drainage of perianal abscess.    SURGEON: Lynette Smith MD     ANESTHESIA: General endotracheal with local.     ESTIMATED BLOOD LOSS: Minimal.     IV FLUIDS: Please see anesthesia's notes.     INDICATIONS: The patient is a 67-year-old gentleman who has a history of recurrent perianal abscesses. He has never undergone definitive incision and drainage. He returned to the office complaining of recurrence. He presents today for exam under anesthesia and incision and drainage. The risks, benefits, complications, and possible alternatives of the above procedure were discussed. The patient agreed to proceed.     DESCRIPTION OF PROCEDURE: The patient was taken to the operating room, laid supine on the operating room table. After general endotracheal anesthesia was obtained, he was placed in jackknife prone positioning. The perianal region was then prepped and draped in the usual sterile fashion. Posterior midline just beyond the anal margin, there was an opening. No further expressible discharge was seen. Digital rectal examination demonstrated a flaccid tone. There were no mucosal lesions, fluctuance, or firm masses. Deep to the skin opening, there was some induration, but I was not able to see any purulent discharge at the dentate line. I made a longitudinal incision to enlarge the area and no further purulence was seen. The cavity did slightly track posteriorly. I did not see a midline pit suggesting that this was actually a pilonidal abscess, but it did track posteriorly and with palpation, there was a small opening visualized in the midline. I opened this track to include it to be part of the wound. The wound bed was copiously irrigated with sterile saline. Hemostasis was obtained with Bovie  electrocautery. It was then packed with saline-soaked gauze and dry dressings were applied. Then, 0.25% Marcaine with epinephrine was then used to infiltrate the skin and subcutaneous tissues for local anesthesia. The patient was laid supine and awakened from general endotracheal anesthesia and transported to the postanesthesia unit in stable condition. The sponge, needle, and instrument counts were correct at the end of the case.     COMPLICATIONS: None.     DISPOSITION: Good, stable to the PACU.     FINDINGS: Posterior midline abscess cavity, shallow just beyond the anal margin, possibly a pilonidal abscess, not perianal abscess.        cc:                   Lynette [id number]  D:  03/03/2017 11:08:50(Eastern Time)  T:  03/03/2017 13:12:44(Eastern Time)  Voice ID:  [id number]/Document ID:  [id number]

## 2017-04-17 ENCOUNTER — OFFICE VISIT (OUTPATIENT)
Dept: OTOLARYNGOLOGY | Facility: CLINIC | Age: 67
End: 2017-04-17

## 2017-04-17 VITALS
TEMPERATURE: 97.9 F | HEART RATE: 71 BPM | WEIGHT: 174 LBS | DIASTOLIC BLOOD PRESSURE: 69 MMHG | BODY MASS INDEX: 23.06 KG/M2 | HEIGHT: 73 IN | SYSTOLIC BLOOD PRESSURE: 125 MMHG

## 2017-04-17 DIAGNOSIS — H69.82 EUSTACHIAN TUBE DYSFUNCTION, LEFT: Primary | ICD-10-CM

## 2017-04-17 DIAGNOSIS — H65.22 CHRONIC SEROUS OTITIS MEDIA OF LEFT EAR: ICD-10-CM

## 2017-04-17 PROCEDURE — 69433 CREATE EARDRUM OPENING: CPT | Performed by: OTOLARYNGOLOGY

## 2017-04-17 RX ORDER — AZELASTINE 1 MG/ML
1 SPRAY, METERED NASAL 2 TIMES DAILY PRN
COMMUNITY
End: 2020-12-03

## 2017-04-17 RX ORDER — CIPROFLOXACIN AND DEXAMETHASONE 3; 1 MG/ML; MG/ML
4 SUSPENSION/ DROPS AURICULAR (OTIC) 2 TIMES DAILY
Qty: 7.5 ML | Refills: 0 | Status: SHIPPED | OUTPATIENT
Start: 2017-04-17 | End: 2017-04-24

## 2017-05-30 ENCOUNTER — OFFICE VISIT (OUTPATIENT)
Dept: OTOLARYNGOLOGY | Facility: CLINIC | Age: 67
End: 2017-05-30

## 2017-05-30 VITALS
HEIGHT: 72 IN | TEMPERATURE: 97.8 F | SYSTOLIC BLOOD PRESSURE: 141 MMHG | RESPIRATION RATE: 20 BRPM | BODY MASS INDEX: 23.57 KG/M2 | HEART RATE: 67 BPM | DIASTOLIC BLOOD PRESSURE: 70 MMHG | WEIGHT: 174 LBS

## 2017-05-30 DIAGNOSIS — H65.22 CHRONIC SEROUS OTITIS MEDIA OF LEFT EAR: ICD-10-CM

## 2017-05-30 DIAGNOSIS — H69.82 EUSTACHIAN TUBE DYSFUNCTION, LEFT: ICD-10-CM

## 2017-05-30 DIAGNOSIS — J31.0 CHRONIC RHINITIS: Primary | ICD-10-CM

## 2017-05-30 PROCEDURE — 99213 OFFICE O/P EST LOW 20 MIN: CPT | Performed by: NURSE PRACTITIONER

## 2017-06-28 ENCOUNTER — PROCEDURE VISIT (OUTPATIENT)
Dept: OTOLARYNGOLOGY | Facility: CLINIC | Age: 67
End: 2017-06-28

## 2017-06-28 ENCOUNTER — OFFICE VISIT (OUTPATIENT)
Dept: OTOLARYNGOLOGY | Facility: CLINIC | Age: 67
End: 2017-06-28

## 2017-06-28 VITALS — TEMPERATURE: 98 F | WEIGHT: 174 LBS | HEIGHT: 72 IN | BODY MASS INDEX: 23.57 KG/M2

## 2017-06-28 DIAGNOSIS — H69.82 EUSTACHIAN TUBE DYSFUNCTION, LEFT: Primary | ICD-10-CM

## 2017-06-28 DIAGNOSIS — H90.3 ASYMMETRICAL SENSORINEURAL HEARING LOSS: ICD-10-CM

## 2017-06-28 PROBLEM — H65.22 CHRONIC SEROUS OTITIS MEDIA OF LEFT EAR: Status: RESOLVED | Noted: 2017-04-17 | Resolved: 2017-06-28

## 2017-06-28 PROCEDURE — 99213 OFFICE O/P EST LOW 20 MIN: CPT | Performed by: OTOLARYNGOLOGY

## 2017-06-28 RX ORDER — ACETAMINOPHEN, ASPIRIN AND CAFFEINE 250; 250; 65 MG/1; MG/1; MG/1
1 TABLET, FILM COATED ORAL EVERY 6 HOURS PRN
Status: ON HOLD | COMMUNITY
End: 2020-07-02

## 2017-06-28 RX ORDER — ACETAMINOPHEN, ASPIRIN AND CAFFEINE 250; 250; 65 MG/1; MG/1; MG/1
TABLET, FILM COATED ORAL
COMMUNITY
End: 2017-06-28

## 2017-06-28 NOTE — PROGRESS NOTES
Patient Care Team:  Joe Velasco MD as PCP - General (Internal Medicine)    Chief Complaint   Patient presents with   • Follow-up     ETD       Subjective   History of Present Illness:  Ricardo Hugo is a  67 y.o. male who presents for follow-up of left ear tube placement.  He reports his hearing is improved since the tube placement but he still has a left greater than right sensorineural loss.  He notices this mostly in his left ear.  He is able to tolerate his symptoms without amplification.  He has had no complaints of drainage from the ear.    Review of Systems:  Review of Systems   Constitutional: Negative.    HENT:        See HPI   Eyes: Negative.    Respiratory: Negative.    Cardiovascular: Negative.    Gastrointestinal: Negative.    Endocrine: Negative.    Allergic/Immunologic: Negative.    Neurological: Negative.    Hematological: Negative.    Psychiatric/Behavioral: Negative.        Past History:  Past Medical History:   Diagnosis Date   • Acid reflux    • Allergic rhinitis    • Chronic mucoid otitis media    • Chronic rhinitis    • Chronic sinusitis    • Coronary artery disease     HEART BYPASS 2004   • Eustachian tube dysfunction    • Heart disease    • Hypertension    • Mixed hearing loss of left ear    • Perianal abscess    • Sensorineural hearing loss    • Tinnitus      Past Surgical History:   Procedure Laterality Date   • CORONARY ARTERY BYPASS GRAFT     • INCISION AND DRAINAGE PERIRECTAL ABSCESS N/A 3/3/2017    Procedure: INCISION AND DRAINAGE OF JEET ANAL ABSCESS;  Surgeon: Lynette Smith MD;  Location: [facility] OR;  Service:    • MYRINGOTOMY W/ TUBES Left 04/17/2017    06/10/2016   • TONSILLECTOMY     • TOTAL HIP ARTHROPLASTY       Family History   Problem Relation Age of Onset   • Diabetes Other      Social History   Substance Use Topics   • Smoking status: Former Smoker   • Smokeless tobacco: Never Used      Comment: quit 2010   • Alcohol use Yes      Comment: 2 beers daily       Current  Outpatient Prescriptions:   •  albuterol (PROVENTIL HFA;VENTOLIN HFA) 108 (90 BASE) MCG/ACT inhaler, Every 6 (Six) Hours., Disp: , Rfl:   •  amLODIPine (NORVASC) 10 MG tablet, Take 10 mg by mouth daily, Disp: , Rfl:   •  aspirin-acetaminophen-caffeine (EXCEDRIN MIGRAINE) 250-250-65 MG per tablet, Take 1 tablet by mouth Every 6 (Six) Hours As Needed for Headache., Disp: , Rfl:   •  azelastine (ASTELIN) 0.1 % nasal spray, 2 sprays into each nostril 2 (Two) Times a Day. Use in each nostril as directed, Disp: , Rfl:   •  cetirizine (zyrTEC) 10 MG tablet, Take 10 mg by mouth daily, Disp: , Rfl:   •  CloNIDine (CATAPRES) 0.2 MG tablet, Take 0.2 mg by mouth 3 times daily, Disp: , Rfl:   •  fluticasone (FLONASE) 50 MCG/ACT nasal spray, 1 spray by Nasal route daily, Disp: , Rfl:   •  magnesium chloride ER (MAG-DELAY) 535 (64 MG) MG CR tablet, Take 64 mg by mouth daily, Disp: , Rfl:   •  Multiple Vitamins-Minerals (PRESERVISION/LUTEIN PO), Take  by mouth., Disp: , Rfl:   •  Omeprazole Magnesium 20.6 (20 BASE) MG capsule delayed-release, Take by mouth daily, Disp: , Rfl:   •  valsartan (DIOVAN) 80 MG tablet, Take 80 mg by mouth daily, Disp: , Rfl:   Allergies:  Lortab [hydrocodone-acetaminophen]    Objective     Vital Signs:  Temp:  [98 °F (36.7 °C)] 98 °F (36.7 °C)    Physical Exam:  CONSTITUTIONAL: well nourished, well-developed, alert, oriented, in no acute distress  COMMUNICATION AND VOICE: able to communicate normally for age, normal voice quality  HEAD: normocephalic, no lesions, atraumatic, no tenderness, no masses  FACE: appearance normal, no lesions, no tenderness, no deformities, facial motion symmetric  EYES: ocular motility normal, eyelids normal, orbits normal, no proptosis, conjunctiva normal , pupils equal, round  EARS:  Hearing: response to conversational voice normal bilaterally  External Ears: auricles without lesions  EXTERNAL EAR CANALS: normal ear canals without stenosis or significant cerumen  TYMPANIC  MEMBRANE: tympanic membrane appearance normal, no lesions present, no perforation present no effusion present, left myringotomy tube in place, dry and patent,  NOSE:  External Nose: structure normal, no tenderness on palpation, no nasal discharge, no lesions, no evidence of trauma, nostrils patent  ORAL:  Lips: upper and lower lips without lesion  NECK: neck appearance normal  CHEST/RESPIRATORY: respiratory effort normal, normal chest excursion  CARDIOVASCULAR: extremities without cyanosis or edema  NEUROLOGIC/PSYCHIATRIC: oriented appropriately, mood normal, affect appropriate, CN II-XII intact grossly    Results Review:   His audiogram was reviewed with an asymmetric left-sided sensorineural hearing loss    Assessment   1. Eustachian tube dysfunction, left    2. Asymmetrical sensorineural hearing loss        Plan      --------TESTING:--------  mri of the IAC with/ without contrast    Return in about 6 months (around 12/28/2017).    Nathan Chen MD  06/28/17  2:32 PM

## 2017-06-28 NOTE — PROGRESS NOTES
CASE HISTORY DETAILS   Mr. Hugo presented to the clinic this date status post left PE tube placement.  He denied problems or concerns. He has a known bilateral SNHL with a left asymmetry.     SUMMARY   RIGHT  · Otoscopy revealed clear EAC/Unremarkable TM.  · Moderate high frequency sensorineural hearing loss.  · Immitance measures are consistent with normal Type A tympanogram.    LEFT  · Otoscopy revealed PE tube visualized in TM.  · Moderate to severe sloping sensorineural hearing loss.  · Immitance measures are consistent with a patent PE tube (unable to seal).    Thresholds were stable bilaterally when compared to audio obtained in July of 2016.  RECOMMENDATIONS   Results of today's evaluation were discussed with Mr. Hugo and the following recommendations were made:  1. ENT evaluation today as scheduled.    AUDIOGRAM AND IMMITANCE       Pierce Strickland, CCC-A  Audiologist

## 2017-09-25 RX ORDER — FLUTICASONE PROPIONATE 50 MCG
SPRAY, SUSPENSION (ML) NASAL
Qty: 48 G | Refills: 5 | Status: ON HOLD | OUTPATIENT
Start: 2017-09-25 | End: 2020-07-12

## 2017-12-28 ENCOUNTER — OFFICE VISIT (OUTPATIENT)
Dept: OTOLARYNGOLOGY | Age: 67
End: 2017-12-28
Payer: MEDICARE

## 2017-12-28 VITALS — WEIGHT: 174 LBS | BODY MASS INDEX: 23.57 KG/M2 | HEIGHT: 72 IN

## 2017-12-28 DIAGNOSIS — H61.23 IMPACTED CERUMEN, BILATERAL: ICD-10-CM

## 2017-12-28 DIAGNOSIS — H65.92 OTITIS MEDIA WITH EFFUSION, LEFT: Primary | ICD-10-CM

## 2017-12-28 DIAGNOSIS — H69.82 EUSTACHIAN TUBE DYSFUNCTION, LEFT: ICD-10-CM

## 2017-12-28 PROCEDURE — 3017F COLORECTAL CA SCREEN DOC REV: CPT | Performed by: OTOLARYNGOLOGY

## 2017-12-28 PROCEDURE — 99204 OFFICE O/P NEW MOD 45 MIN: CPT | Performed by: OTOLARYNGOLOGY

## 2017-12-28 PROCEDURE — 1123F ACP DISCUSS/DSCN MKR DOCD: CPT | Performed by: OTOLARYNGOLOGY

## 2017-12-28 PROCEDURE — 4040F PNEUMOC VAC/ADMIN/RCVD: CPT | Performed by: OTOLARYNGOLOGY

## 2017-12-28 PROCEDURE — 69210 REMOVE IMPACTED EAR WAX UNI: CPT | Performed by: OTOLARYNGOLOGY

## 2017-12-28 PROCEDURE — G8420 CALC BMI NORM PARAMETERS: HCPCS | Performed by: OTOLARYNGOLOGY

## 2017-12-28 PROCEDURE — G8484 FLU IMMUNIZE NO ADMIN: HCPCS | Performed by: OTOLARYNGOLOGY

## 2017-12-28 PROCEDURE — G8427 DOCREV CUR MEDS BY ELIG CLIN: HCPCS | Performed by: OTOLARYNGOLOGY

## 2017-12-28 PROCEDURE — 1036F TOBACCO NON-USER: CPT | Performed by: OTOLARYNGOLOGY

## 2017-12-28 NOTE — PROGRESS NOTES
daily      valsartan (DIOVAN) 80 MG tablet Take 80 mg by mouth daily      Multiple Vitamins-Minerals (PRESERVISION/LUTEIN PO) Take 5 mg by mouth daily      magnesium chloride (MAG DELAY 64) 535 (64 MG) MG TBCR CR tablet Take 64 mg by mouth daily      Omeprazole Magnesium 20.6 (20 BASE) MG CPDR Take by mouth daily      cetirizine (ZYRTEC) 10 MG tablet Take 10 mg by mouth daily      albuterol sulfate  (90 BASE) MCG/ACT inhaler Inhale 2 puffs into the lungs every 6 hours as needed for Wheezing      fluticasone (FLONASE) 50 MCG/ACT nasal spray 1 spray by Nasal route daily      aspirin-acetaminophen-caffeine (EXCEDRIN MIGRAINE) 250-250-65 MG per tablet Take 1 tablet by mouth every 8 hours as needed for Headaches       No current facility-administered medications for this visit. Allergies   Allergen Reactions    Hydrocodone     Hydrocodone-Acetaminophen      CLOSTROPHOBIC       Subjective:     Chronic eustachian tube dysfunction. Left ear with chronic ETD and hx multiple sete of tymp tubes now needing another. RTO with recent audio and will perform left M&T. Review of Systems  A 12 point review of systems was completed, reviewed, and scanned to chart per staff. Patient medical history reviewed. Objective:     Physical Exam   Constitutional: He is oriented to person, place, and time. He appears well-developed and well-nourished. HENT:   Head: Normocephalic and atraumatic. Right Ear: Hearing, tympanic membrane, external ear and ear canal normal. No drainage. No decreased hearing is noted. Left Ear: Hearing, tympanic membrane, external ear and ear canal normal. No drainage. No decreased hearing is noted. Nose: Nose normal. No mucosal edema, rhinorrhea or septal deviation. Right sinus exhibits no maxillary sinus tenderness and no frontal sinus tenderness. Left sinus exhibits no maxillary sinus tenderness and no frontal sinus tenderness.    Mouth/Throat: Uvula is midline and oropharynx is clear and moist. No oral lesions. Left ear with retraction and OME. Right w slight retraction but no symptoms. Request left M&T. Eyes: Conjunctivae and EOM are normal. Pupils are equal, round, and reactive to light. Neck: Normal range of motion. Neck supple. No tracheal deviation present. No thyromegaly present. Pulmonary/Chest: No stridor. Lymphadenopathy:     He has no cervical adenopathy. Neurological: He is alert and oriented to person, place, and time. No cranial nerve deficit. Coordination normal.   Psychiatric: He has a normal mood and affect. His behavior is normal.   Nursing note and vitals reviewed. The patient has impacted cerumen. Impacted cerumen was safely removed from the right and left ear(s) with appropriate instrumentation viewing through an operating microscope. It was necessary to use the microscope. Cerumen was removed uneventfully with special instruments including a day hook, curette, and ear suction. Ht 6' (1.829 m)   Wt 174 lb (78.9 kg)   BMI 23.60 kg/m²     Assessment:     1. Otitis media with effusion, left     2. Eustachian tube dysfunction, left     3. Impacted cerumen, bilateral  OR REMOVAL IMPACTED CERUMEN INSTRUMENTATION UNILAT         Plan: Will rto with audio recently obtained. Left ear with chronic ETD and hx multiple sete of tymp tubes now needing another. RTO with recent audio and will perform left M&T. Medical and surgical options explained. The procedure, risk and likely benefits explained. The risks include bleeding, infection, anesthesia risk, TM perforation, hearing loss (rare), tube dysfunction and usual natural history explained. Patient (or guardian) opt to proceed. General anesthesia for children necessary. To be scheduled. BM&T    office  30 min      Cherelle ARCE, sirisha scribing for and in the presence of Dr. Shannon Hoffman December 28, 2017/2:55 PM/ Cherelle Joshua.   Syed Sanchez MD,  I personally performed the services described in this documentation.

## 2018-01-03 ENCOUNTER — PROCEDURE VISIT (OUTPATIENT)
Dept: OTOLARYNGOLOGY | Age: 68
End: 2018-01-03
Payer: MEDICARE

## 2018-01-03 VITALS
DIASTOLIC BLOOD PRESSURE: 82 MMHG | WEIGHT: 174 LBS | HEART RATE: 78 BPM | SYSTOLIC BLOOD PRESSURE: 127 MMHG | TEMPERATURE: 98 F | BODY MASS INDEX: 23.57 KG/M2 | RESPIRATION RATE: 16 BRPM | HEIGHT: 72 IN

## 2018-01-03 DIAGNOSIS — H69.82 EUSTACHIAN TUBE DYSFUNCTION, LEFT: ICD-10-CM

## 2018-01-03 DIAGNOSIS — H90.12 CONDUCTIVE HEARING LOSS OF LEFT EAR, UNSPECIFIED HEARING STATUS ON CONTRALATERAL SIDE: ICD-10-CM

## 2018-01-03 DIAGNOSIS — H65.92 OTITIS MEDIA WITH EFFUSION, LEFT: Primary | ICD-10-CM

## 2018-01-03 PROCEDURE — 69433 CREATE EARDRUM OPENING: CPT | Performed by: OTOLARYNGOLOGY

## 2018-08-01 ENCOUNTER — OFFICE VISIT (OUTPATIENT)
Dept: OTOLARYNGOLOGY | Age: 68
End: 2018-08-01
Payer: MEDICARE

## 2018-08-01 VITALS
WEIGHT: 169 LBS | DIASTOLIC BLOOD PRESSURE: 80 MMHG | RESPIRATION RATE: 16 BRPM | HEART RATE: 70 BPM | BODY MASS INDEX: 22.89 KG/M2 | SYSTOLIC BLOOD PRESSURE: 130 MMHG | OXYGEN SATURATION: 99 % | HEIGHT: 72 IN | TEMPERATURE: 97.9 F

## 2018-08-01 DIAGNOSIS — Z96.22 HISTORY OF PLACEMENT OF EAR TUBES: ICD-10-CM

## 2018-08-01 DIAGNOSIS — H93.8X2 SENSATION OF PLUGGED EAR ON LEFT SIDE: Primary | ICD-10-CM

## 2018-08-01 PROCEDURE — 1036F TOBACCO NON-USER: CPT | Performed by: OTOLARYNGOLOGY

## 2018-08-01 PROCEDURE — 99213 OFFICE O/P EST LOW 20 MIN: CPT | Performed by: OTOLARYNGOLOGY

## 2018-08-01 PROCEDURE — G8427 DOCREV CUR MEDS BY ELIG CLIN: HCPCS | Performed by: OTOLARYNGOLOGY

## 2018-08-01 PROCEDURE — G8420 CALC BMI NORM PARAMETERS: HCPCS | Performed by: OTOLARYNGOLOGY

## 2018-08-01 PROCEDURE — 1123F ACP DISCUSS/DSCN MKR DOCD: CPT | Performed by: OTOLARYNGOLOGY

## 2018-08-01 PROCEDURE — 4040F PNEUMOC VAC/ADMIN/RCVD: CPT | Performed by: OTOLARYNGOLOGY

## 2018-08-01 PROCEDURE — 3017F COLORECTAL CA SCREEN DOC REV: CPT | Performed by: OTOLARYNGOLOGY

## 2018-08-01 PROCEDURE — 1101F PT FALLS ASSESS-DOCD LE1/YR: CPT | Performed by: OTOLARYNGOLOGY

## 2018-08-06 ENCOUNTER — PROCEDURE VISIT (OUTPATIENT)
Dept: OTOLARYNGOLOGY | Age: 68
End: 2018-08-06
Payer: MEDICARE

## 2018-08-06 VITALS
WEIGHT: 169 LBS | OXYGEN SATURATION: 99 % | BODY MASS INDEX: 22.89 KG/M2 | DIASTOLIC BLOOD PRESSURE: 78 MMHG | SYSTOLIC BLOOD PRESSURE: 136 MMHG | HEART RATE: 75 BPM | TEMPERATURE: 98.1 F | HEIGHT: 72 IN | RESPIRATION RATE: 16 BRPM

## 2018-08-06 DIAGNOSIS — Z96.22 HISTORY OF PLACEMENT OF EAR TUBES: ICD-10-CM

## 2018-08-06 DIAGNOSIS — H65.92 OTITIS MEDIA WITH EFFUSION, LEFT: Primary | ICD-10-CM

## 2018-08-06 DIAGNOSIS — H69.82 EUSTACHIAN TUBE DYSFUNCTION, LEFT: ICD-10-CM

## 2018-08-06 DIAGNOSIS — H93.8X2 SENSATION OF PLUGGED EAR ON LEFT SIDE: ICD-10-CM

## 2018-08-06 PROCEDURE — 69433 CREATE EARDRUM OPENING: CPT | Performed by: OTOLARYNGOLOGY

## 2018-08-06 NOTE — PROGRESS NOTES
[facility]  [address]  [address]   Dept: [phone]  Dept Fax: [phone]  Loc: [phone]    Chris Fong is a 77 y.o. male who presents today for his medical conditions/complaints as noted below. Chris Fong is c/o of Procedure (Ear tube placement)      Current Outpatient Prescriptions   Medication Sig Dispense Refill    azelastine (ASTELIN) 0.1 % nasal spray 2 sprays by Nasal route 2 times daily as needed for Rhinitis Use in each nostril as directed      cloNIDine (CATAPRES) 0.2 MG tablet Take 0.2 mg by mouth 3 times daily      amLODIPine (NORVASC) 10 MG tablet Take 10 mg by mouth daily      valsartan (DIOVAN) 80 MG tablet Take 80 mg by mouth daily      Multiple Vitamins-Minerals (PRESERVISION/LUTEIN PO) Take 5 mg by mouth daily      magnesium chloride (MAG DELAY 64) 535 (64 MG) MG TBCR CR tablet Take 64 mg by mouth daily      Omeprazole Magnesium 20.6 (20 BASE) MG CPDR Take by mouth daily      cetirizine (ZYRTEC) 10 MG tablet Take 10 mg by mouth daily      albuterol sulfate  (90 BASE) MCG/ACT inhaler Inhale 2 puffs into the lungs every 6 hours as needed for Wheezing      fluticasone (FLONASE) 50 MCG/ACT nasal spray 1 spray by Nasal route daily      aspirin-acetaminophen-caffeine (EXCEDRIN MIGRAINE) 250-250-65 MG per tablet Take 1 tablet by mouth every 8 hours as needed for Headaches       No current facility-administered medications for this visit. Allergies   Allergen Reactions    Hydrocodone     Hydrocodone-Acetaminophen      CLOSTROPHOBIC       Subjective:    Left ear tube insertion for chr ETD and OME. Patient medical history reviewed. Objective:     Physical Exam  /78   Pulse 75   Temp 98.1 °F (36.7 °C)   Resp 16   Ht 6' (1.829 m)   Wt 169 lb (76.7 kg)   SpO2 99%   BMI 22.92 kg/m²   Chris Fong is here for placement of a tympanostomy tube. The risks and benefits were explained.

## 2018-12-12 ENCOUNTER — HOSPITAL ENCOUNTER (OUTPATIENT)
Dept: ULTRASOUND IMAGING | Facility: HOSPITAL | Age: 68
Discharge: HOME | End: 2018-12-12
Attending: INTERNAL MEDICINE | Admitting: INTERNAL MEDICINE

## 2018-12-12 ENCOUNTER — TRANSCRIBE ORDERS (OUTPATIENT)
Dept: ADMINISTRATIVE | Facility: HOSPITAL | Age: 68
End: 2018-12-12

## 2018-12-12 DIAGNOSIS — I73.9 PERIPHERAL VASCULAR DISEASE, UNSPECIFIED (HCC): ICD-10-CM

## 2018-12-12 DIAGNOSIS — I73.9 PERIPHERAL VASCULAR DISEASE, UNSPECIFIED (HCC): Primary | ICD-10-CM

## 2018-12-12 PROCEDURE — 93924 LWR XTR VASC STDY BILAT: CPT

## 2018-12-19 RX ORDER — FLUTICASONE PROPIONATE 50 MCG
SPRAY, SUSPENSION (ML) NASAL
Qty: 48 G | Refills: 5 | OUTPATIENT
Start: 2018-12-19

## 2019-01-15 ENCOUNTER — OFFICE VISIT (OUTPATIENT)
Dept: VASCULAR SURGERY | Age: 69
End: 2019-01-15
Payer: MEDICARE

## 2019-01-15 VITALS
OXYGEN SATURATION: 100 % | RESPIRATION RATE: 18 BRPM | TEMPERATURE: 96.8 F | SYSTOLIC BLOOD PRESSURE: 161 MMHG | HEART RATE: 67 BPM | DIASTOLIC BLOOD PRESSURE: 73 MMHG

## 2019-01-15 DIAGNOSIS — I73.9 PVD (PERIPHERAL VASCULAR DISEASE) (HCC): Primary | ICD-10-CM

## 2019-01-15 PROCEDURE — G8484 FLU IMMUNIZE NO ADMIN: HCPCS | Performed by: PHYSICIAN ASSISTANT

## 2019-01-15 PROCEDURE — 3017F COLORECTAL CA SCREEN DOC REV: CPT | Performed by: PHYSICIAN ASSISTANT

## 2019-01-15 PROCEDURE — 1101F PT FALLS ASSESS-DOCD LE1/YR: CPT | Performed by: PHYSICIAN ASSISTANT

## 2019-01-15 PROCEDURE — G8420 CALC BMI NORM PARAMETERS: HCPCS | Performed by: PHYSICIAN ASSISTANT

## 2019-01-15 PROCEDURE — 4040F PNEUMOC VAC/ADMIN/RCVD: CPT | Performed by: PHYSICIAN ASSISTANT

## 2019-01-15 PROCEDURE — 99203 OFFICE O/P NEW LOW 30 MIN: CPT | Performed by: PHYSICIAN ASSISTANT

## 2019-01-15 PROCEDURE — 1123F ACP DISCUSS/DSCN MKR DOCD: CPT | Performed by: PHYSICIAN ASSISTANT

## 2019-01-15 PROCEDURE — 1036F TOBACCO NON-USER: CPT | Performed by: PHYSICIAN ASSISTANT

## 2019-01-15 PROCEDURE — G8427 DOCREV CUR MEDS BY ELIG CLIN: HCPCS | Performed by: PHYSICIAN ASSISTANT

## 2019-05-02 ENCOUNTER — TELEPHONE (OUTPATIENT)
Dept: OTOLARYNGOLOGY | Age: 69
End: 2019-05-02

## 2019-05-02 NOTE — TELEPHONE ENCOUNTER
Called patient back to schedule his one year follow up after his tube placement.  Patient did not answer, LVM

## 2019-05-02 NOTE — TELEPHONE ENCOUNTER
[place] called to schedule a procedure. Please be advised that the best time to call him to accommodate their needs is whenever is convenient . Thank you.

## 2019-05-08 ENCOUNTER — OFFICE VISIT (OUTPATIENT)
Dept: OTOLARYNGOLOGY | Age: 69
End: 2019-05-08
Payer: MEDICARE

## 2019-05-08 VITALS
HEART RATE: 85 BPM | TEMPERATURE: 97.6 F | SYSTOLIC BLOOD PRESSURE: 156 MMHG | OXYGEN SATURATION: 99 % | HEIGHT: 72 IN | DIASTOLIC BLOOD PRESSURE: 78 MMHG | BODY MASS INDEX: 23.3 KG/M2 | RESPIRATION RATE: 18 BRPM | WEIGHT: 172 LBS

## 2019-05-08 DIAGNOSIS — Z96.22 HISTORY OF PLACEMENT OF EAR TUBES: Primary | ICD-10-CM

## 2019-05-08 PROCEDURE — G8427 DOCREV CUR MEDS BY ELIG CLIN: HCPCS | Performed by: OTOLARYNGOLOGY

## 2019-05-08 PROCEDURE — 1036F TOBACCO NON-USER: CPT | Performed by: OTOLARYNGOLOGY

## 2019-05-08 PROCEDURE — 4040F PNEUMOC VAC/ADMIN/RCVD: CPT | Performed by: OTOLARYNGOLOGY

## 2019-05-08 PROCEDURE — G8420 CALC BMI NORM PARAMETERS: HCPCS | Performed by: OTOLARYNGOLOGY

## 2019-05-08 PROCEDURE — 99213 OFFICE O/P EST LOW 20 MIN: CPT | Performed by: OTOLARYNGOLOGY

## 2019-05-08 PROCEDURE — 3017F COLORECTAL CA SCREEN DOC REV: CPT | Performed by: OTOLARYNGOLOGY

## 2019-05-08 PROCEDURE — 1123F ACP DISCUSS/DSCN MKR DOCD: CPT | Performed by: OTOLARYNGOLOGY

## 2019-05-08 NOTE — PROGRESS NOTES
[facility]  [address]  [address] [place]  Dept: [phone]  Dept Fax: [phone]  Loc: [phone]     Becky Martinez is a 68 y.o. male who presents today for his medical conditions/complaintsas noted below. Becky Martinez is c/o of Ear Problem (Patient thinks left ear tube has fallen on )      Current Outpatient Medications   Medication Sig Dispense Refill    aspirin 81 MG tablet Take 81 mg by mouth daily      azelastine (ASTELIN) 0.1 % nasal spray 2 sprays by Nasal route 2 times daily as needed for Rhinitis Use in each nostril as directed      cloNIDine (CATAPRES) 0.2 MG tablet Take 0.2 mg by mouth 3 times daily      amLODIPine (NORVASC) 10 MG tablet Take 10 mg by mouth daily      valsartan (DIOVAN) 80 MG tablet Take 160 mg by mouth daily       Multiple Vitamins-Minerals (PRESERVISION/LUTEIN PO) Take 5 mg by mouth daily      magnesium chloride (MAG DELAY 64) 535 (64 MG) MG TBCR CR tablet Take 64 mg by mouth daily      Omeprazole Magnesium 20.6 (20 BASE) MG CPDR Take by mouth daily      cetirizine (ZYRTEC) 10 MG tablet Take 10 mg by mouth daily      albuterol sulfate  (90 BASE) MCG/ACT inhaler Inhale 2 puffs into the lungs every 6 hours as needed for Wheezing      fluticasone (FLONASE) 50 MCG/ACT nasal spray 1 spray by Nasal route daily      aspirin-acetaminophen-caffeine (EXCEDRIN MIGRAINE) 250-250-65 MG per tablet Take 1 tablet by mouth every 8 hours as needed for Headaches       No current facility-administered medications for this visit. Allergies   Allergen Reactions    Hydrocodone     Hydrocodone-Acetaminophen      CLOSTROPHOBIC       Subjective:    A 12 point review of systems was completed, reviewed, and scanned to chart per staff. Patient medical history reviewed. Feeling left ear of secretions moving around.     Objective:     Physical Exam  BP (!) 156/78   Pulse 85   Temp 97.6 °F (36.4 °C)   Resp 18   Ht

## 2019-07-18 ENCOUNTER — HOSPITAL ENCOUNTER (OUTPATIENT)
Dept: VASCULAR LAB | Age: 69
Discharge: HOME | End: 2019-07-18
Payer: MEDICARE

## 2019-07-18 ENCOUNTER — OFFICE VISIT (OUTPATIENT)
Dept: VASCULAR SURGERY | Age: 69
End: 2019-07-18
Payer: MEDICARE

## 2019-07-18 VITALS
TEMPERATURE: 97.2 F | OXYGEN SATURATION: 98 % | DIASTOLIC BLOOD PRESSURE: 75 MMHG | HEART RATE: 66 BPM | RESPIRATION RATE: 18 BRPM | SYSTOLIC BLOOD PRESSURE: 150 MMHG

## 2019-07-18 DIAGNOSIS — I70.213 ATHEROSCLEROSIS OF NATIVE ARTERY OF BOTH LOWER EXTREMITIES WITH INTERMITTENT CLAUDICATION (HCC): ICD-10-CM

## 2019-07-18 DIAGNOSIS — I73.9 PVD (PERIPHERAL VASCULAR DISEASE) (HCC): ICD-10-CM

## 2019-07-18 PROCEDURE — 93923 UPR/LXTR ART STDY 3+ LVLS: CPT

## 2019-07-18 PROCEDURE — 4040F PNEUMOC VAC/ADMIN/RCVD: CPT | Performed by: PHYSICIAN ASSISTANT

## 2019-07-18 PROCEDURE — 1123F ACP DISCUSS/DSCN MKR DOCD: CPT | Performed by: PHYSICIAN ASSISTANT

## 2019-07-18 PROCEDURE — G8420 CALC BMI NORM PARAMETERS: HCPCS | Performed by: PHYSICIAN ASSISTANT

## 2019-07-18 PROCEDURE — G8427 DOCREV CUR MEDS BY ELIG CLIN: HCPCS | Performed by: PHYSICIAN ASSISTANT

## 2019-07-18 PROCEDURE — 99212 OFFICE O/P EST SF 10 MIN: CPT | Performed by: PHYSICIAN ASSISTANT

## 2019-07-18 PROCEDURE — 1036F TOBACCO NON-USER: CPT | Performed by: PHYSICIAN ASSISTANT

## 2019-07-18 PROCEDURE — G8598 ASA/ANTIPLAT THER USED: HCPCS | Performed by: PHYSICIAN ASSISTANT

## 2019-07-18 PROCEDURE — 3017F COLORECTAL CA SCREEN DOC REV: CPT | Performed by: PHYSICIAN ASSISTANT

## 2019-07-18 NOTE — PROGRESS NOTES
aspirin-acetaminophen-caffeine (EXCEDRIN MIGRAINE) 250-250-65 MG per tablet Take 1 tablet by mouth every 8 hours as needed for Headaches       No current facility-administered medications for this visit. Allergies: Hydrocodone and Hydrocodone-acetaminophen  Past Medical History:   Diagnosis Date    Arthritis     Asthma     BPPV (benign paroxysmal positional vertigo)     CAD (coronary artery disease)     Colon polyps     Colon polyps     Dizziness     History of alcoholism (HCC)     Hyperlipidemia     Hypertension     Low sodium levels     Otitis     Pharyngitis     Proteinuria     Sebaceous cyst     Sinusitis     URI (upper respiratory infection)      Past Surgical History:   Procedure Laterality Date    COLONOSCOPY  ?     ? Doctor or where    COLONOSCOPY N/A 3/10/2016    Dr Lia Glover AP x 2 (-) dysplasia, BCM x 2, 5 yr recall    CORONARY ARTERY BYPASS GRAFT      CYST REMOVAL      SEBACEOUS    JOINT REPLACEMENT      RTH    TONSILLECTOMY AND ADENOIDECTOMY      TYMPANOSTOMY TUBE PLACEMENT Left      Family History   Problem Relation Age of Onset    Colon Cancer Neg Hx     Colon Polyps Neg Hx     Esophageal Cancer Neg Hx     Liver Disease Neg Hx     Liver Cancer Neg Hx     Rectal Cancer Neg Hx     Stomach Cancer Neg Hx      Social History     Tobacco Use    Smoking status: Former Smoker     Packs/day: 1.00     Years: 12.00     Pack years: 12.00     Last attempt to quit: 3/10/2013     Years since quittin.3    Smokeless tobacco: Never Used   Substance Use Topics    Alcohol use: Yes     Alcohol/week: 2.0 standard drinks     Types: 2 Cans of beer per week     Comment: DAILY       Review of Systems    Constitutional - no significant activity change, appetite change, or unexpected weight change. No fever or chills. No diaphoresis or significant fatigue. HENT - no significant rhinorrhea or epistaxis. No tinnitus or significant hearing loss.   Eyes - no sudden vision change

## 2020-01-28 ENCOUNTER — HOSPITAL ENCOUNTER (OUTPATIENT)
Dept: VASCULAR LAB | Age: 70
Discharge: HOME | End: 2020-01-28
Payer: MEDICARE

## 2020-01-28 ENCOUNTER — OFFICE VISIT (OUTPATIENT)
Dept: VASCULAR SURGERY | Age: 70
End: 2020-01-28
Payer: MEDICARE

## 2020-01-28 VITALS
HEART RATE: 49 BPM | RESPIRATION RATE: 18 BRPM | HEIGHT: 72 IN | SYSTOLIC BLOOD PRESSURE: 109 MMHG | TEMPERATURE: 98.4 F | WEIGHT: 175 LBS | BODY MASS INDEX: 23.7 KG/M2 | DIASTOLIC BLOOD PRESSURE: 70 MMHG

## 2020-01-28 PROCEDURE — 93923 UPR/LXTR ART STDY 3+ LVLS: CPT

## 2020-01-28 PROCEDURE — G8420 CALC BMI NORM PARAMETERS: HCPCS | Performed by: PHYSICIAN ASSISTANT

## 2020-01-28 PROCEDURE — G8427 DOCREV CUR MEDS BY ELIG CLIN: HCPCS | Performed by: PHYSICIAN ASSISTANT

## 2020-01-28 PROCEDURE — 4040F PNEUMOC VAC/ADMIN/RCVD: CPT | Performed by: PHYSICIAN ASSISTANT

## 2020-01-28 PROCEDURE — G8484 FLU IMMUNIZE NO ADMIN: HCPCS | Performed by: PHYSICIAN ASSISTANT

## 2020-01-28 PROCEDURE — 99212 OFFICE O/P EST SF 10 MIN: CPT | Performed by: PHYSICIAN ASSISTANT

## 2020-01-28 PROCEDURE — 1036F TOBACCO NON-USER: CPT | Performed by: PHYSICIAN ASSISTANT

## 2020-01-28 PROCEDURE — 3017F COLORECTAL CA SCREEN DOC REV: CPT | Performed by: PHYSICIAN ASSISTANT

## 2020-01-28 PROCEDURE — 1123F ACP DISCUSS/DSCN MKR DOCD: CPT | Performed by: PHYSICIAN ASSISTANT

## 2020-01-28 RX ORDER — FEXOFENADINE HCL 180 MG/1
180 TABLET ORAL DAILY
COMMUNITY

## 2020-01-28 NOTE — PROGRESS NOTES
tinnitus or significant hearing loss. Eyes - no sudden vision change or amaurosis. Respiratory - no significant shortness of breath, wheezing, or stridor. No apnea, cough, or chest tightness associated with shortness of breath. Cardiovascular - no chest pain, syncope, or significant dizziness. No palpitations or significant leg swelling.  (see HPI)  Gastrointestinal - no abdominal swelling or pain. No blood in stool. No severe constipation, diarrhea, nausea, or vomiting. Genitourinary - No difficulty urinating, dysuria, frequency, or urgency. No flank pain or hematuria. Musculoskeletal - no back pain, gait disturbance, or myalgia. Skin - no color change, rash, pallor, or new wound. Neurologic - no dizziness, facial asymmetry, or light headedness. No seizures. No speech difficulty or lateralizing weakness. Hematologic - no easy bruising or excessive bleeding. Psychiatric - no severe anxiety or nervousness. No confusion. All other review of systems are negative. Physical Exam    /70 (Site: Right Upper Arm, Position: Sitting, Cuff Size: Medium Adult)   Pulse (!) 49   Temp 98.4 °F (36.9 °C) (Temporal)   Resp 18   Ht 6' (1.829 m)   Wt 175 lb (79.4 kg)   BMI 23.73 kg/m²     Constitutional - well developed, well nourished. No diaphoresis or acute distress. HENT - head normocephalic. Right external ear canal appears normal.  Left external ear canal appears normal.  Septum appears midline. Eyes - conjunctiva normal.  EOMS normal.  No exudate. No icterus. Neck- ROM appears normal, no tracheal deviation. Cardiovascular - Regular rate and rhythm. Heart sounds are normal.  No murmur, rub, or gallop. Carotid pulses are 2+ to palpation bilaterally without bruit. Extremities - Radial and brachial pulses are 2+ to palpation bilaterally. Femoral pulses are palpable. DP and PT pulses are not palpable. there is a circular dark discoloration on the distal aspect of the right great toe.  (He

## 2020-01-28 NOTE — PROGRESS NOTES
Patient Care Team:  Bailey Louise MD as PCP - General (Internal Medicine)  Bailey Louise MD as Referring Physician (Internal Medicine)  INEZ Rich as Advanced Practice Nurse (Gastroenterology)  Yareli Sanchez MD as Consulting Physician (Otolaryngology)      History and Physical    Mr. Zhanna Sumner is a 69 yo has a history of peripheral vascular disease of the lower extremities. He comes in today for follow up. Current medication include ASA 81 mg daily. Shailesh Wiley has not had new wounds. He reports generalized weakness at a distance of 500 - 600 feet with walking. This is unchanged. He denies any IRP. He does not smoke.     Fabian Fajardo is a 68 y.o. male with the following history reviewed and recorded in [facility]:  Patient Active Problem List    Diagnosis Date Noted    Atherosclerosis of native artery of both lower extremities with intermittent claudication ([facility] [address] 07/18/2019    History of colon polyps 03/10/2016     Current Outpatient Medications   Medication Sig Dispense Refill    fexofenadine (ALLEGRA) 180 MG tablet Take 180 mg by mouth daily      aspirin 81 MG tablet Take 81 mg by mouth daily      azelastine (ASTELIN) 0.1 % nasal spray 2 sprays by Nasal route 2 times daily as needed for Rhinitis Use in each nostril as directed      cloNIDine (CATAPRES) 0.2 MG tablet Take 0.2 mg by mouth 3 times daily      amLODIPine (NORVASC) 10 MG tablet Take 10 mg by mouth daily      valsartan (DIOVAN) 80 MG tablet Take 160 mg by mouth daily       Multiple Vitamins-Minerals (PRESERVISION/LUTEIN PO) Take 5 mg by mouth daily      magnesium chloride (MAG DELAY 64) 535 (64 MG) MG TBCR CR tablet Take 64 mg by mouth daily      Omeprazole Magnesium 20.6 (20 BASE) MG CPDR Take by mouth daily      albuterol sulfate  (90 BASE) MCG/ACT inhaler Inhale 2 puffs into the lungs every 6 hours as needed for Wheezing      fluticasone (FLONASE) 50 MCG/ACT nasal spray 1 spray by Nasal route daily      aspirin-acetaminophen-caffeine (EXCEDRIN MIGRAINE) 250-250-65 MG per tablet Take 1 tablet by mouth every 8 hours as needed for Headaches      cetirizine (ZYRTEC) 10 MG tablet Take 10 mg by mouth daily       No current facility-administered medications for this visit. Allergies: Hydrocodone and Hydrocodone-acetaminophen  Past Medical History:   Diagnosis Date    Arthritis     Asthma     BPPV (benign paroxysmal positional vertigo)     CAD (coronary artery disease)     Colon polyps     Colon polyps     Dizziness     History of alcoholism (HCC)     Hyperlipidemia     Hypertension     Low sodium levels     Otitis     Pharyngitis     Proteinuria     Sebaceous cyst     Sinusitis     URI (upper respiratory infection)      Past Surgical History:   Procedure Laterality Date    COLONOSCOPY  ?     ? Doctor or where    COLONOSCOPY N/A 3/10/2016    Dr Víctor Bedoya AP x 2 (-) dysplasia, BCM x 2, 5 yr recall    CORONARY ARTERY BYPASS GRAFT      CYST REMOVAL      SEBACEOUS    JOINT REPLACEMENT      RTH    TONSILLECTOMY AND ADENOIDECTOMY      TYMPANOSTOMY TUBE PLACEMENT Left      Family History   Problem Relation Age of Onset    Colon Cancer Neg Hx     Colon Polyps Neg Hx     Esophageal Cancer Neg Hx     Liver Disease Neg Hx     Liver Cancer Neg Hx     Rectal Cancer Neg Hx     Stomach Cancer Neg Hx      Social History     Tobacco Use    Smoking status: Former Smoker     Packs/day: 1.00     Years: 12.00     Pack years: 12.00     Last attempt to quit: 3/10/2013     Years since quittin.8    Smokeless tobacco: Never Used   Substance Use Topics    Alcohol use: Yes     Alcohol/week: 2.0 standard drinks     Types: 2 Cans of beer per week     Comment: DAILY       Review of Systems    Constitutional - no significant activity change, appetite change, or unexpected weight change. No fever or chills. No diaphoresis or significant fatigue. HENT - no significant rhinorrhea or epistaxis.   No - effort appears normal.  No respiratory distress. Lungs - Breath sounds normal. No wheezes or rales. GI - Abdomen - soft, non tender, bowel sounds X 4 quadrants. No guarding or rebound tenderness. No distension or palpable mass. Genitourinary - deferred. Musculoskeletal - ROM appears normal.  No significant edema. Neurologic - alert and oriented X 3. Physiologic. Skin - warm, dry, and intact. No rash, erythema, or pallor. Psychiatric - mood, affect, and behavior appear normal.  Judgment and thought processes appear normal.    Risk factors for atherosclerosis of all vascular beds have been reviewed with the patient including:  Family history, tobacco abuse in all forms, elevated cholesterol, hyperlipidemia, and diabetes. Lower extremity arterial study:   Right ANNIKA 0.73, Left ANNIKA 0.60. Individual films reviewed: Yes. Test results were reviewed with the patient. Assessment    1.  Atherosclerosis of native artery of both lower extremities with intermittent claudication (HCC)          Plan    Continue  ASA EC 81 mg daily  Strongly encourage statin therapy (can d/w his PCP)  Good skin care/checks daily  Recommend no smoking   Discussed walking as much as possible     Follow up in 6 months with a repeat JOS or sooner if claudication worsens, patient develops wound or IRP

## 2020-04-23 ENCOUNTER — TELEPHONE (OUTPATIENT)
Dept: INTERNAL MEDICINE | Facility: CLINIC | Age: 70
End: 2020-04-23

## 2020-04-23 NOTE — TELEPHONE ENCOUNTER
Patient called in stating that he has had diarrhea for 8 days now. He is wanting to know if he can get something called in for him.     Pharmacy confirmed.

## 2020-04-24 ENCOUNTER — OFFICE VISIT (OUTPATIENT)
Dept: INTERNAL MEDICINE | Facility: CLINIC | Age: 70
End: 2020-04-24

## 2020-04-24 VITALS — HEIGHT: 72 IN | WEIGHT: 170 LBS | BODY MASS INDEX: 23.03 KG/M2

## 2020-04-24 DIAGNOSIS — R19.7 ACUTE DIARRHEA: Primary | ICD-10-CM

## 2020-04-24 PROBLEM — I70.213 ATHEROSCLEROSIS OF NATIVE ARTERY OF BOTH LOWER EXTREMITIES WITH INTERMITTENT CLAUDICATION: Status: ACTIVE | Noted: 2019-07-18

## 2020-04-24 PROCEDURE — 99442 PR PHYS/QHP TELEPHONE EVALUATION 11-20 MIN: CPT | Performed by: NURSE PRACTITIONER

## 2020-04-24 RX ORDER — DIPHENOXYLATE HYDROCHLORIDE AND ATROPINE SULFATE 2.5; .025 MG/1; MG/1
1 TABLET ORAL AS NEEDED
COMMUNITY
End: 2020-05-07

## 2020-04-24 NOTE — PROGRESS NOTES
Subjective   Ricardo Hugo is a 70 y.o. male.   Chief Complaint   Patient presents with   • Diarrhea     has had for 8 days. No OTC medications. Patient took 2 Lomotil and has just a few left. (Dr Smith gave him Lomotil)      You have chosen to receive care through a telephone visit. Do you consent to use a telephone visit for your medical care today? Yes    Ricardo presents today for complaints of diarrhea.  He reports this started about 7-8 days ago with watery diarrhea that has progressed to a soft, brown, yellow stool with mucus.  He denies eating anything out of his usually or any suspect food but states he did get some carry out from a restaurant.  No one else who ate that food is having symptoms.  He denies recent antibiotic use.  He denies bloody or black stools.  Stools have ranged from 3-6 times daily but has only gone once today.  He found and old Rx for Lomotil given to him by Dr. Smith that he took 2 off.  This has improved symptoms.  He denies abdominal pain, fever, nausea, or vomiting.  He denies any history of diverticulitis/diverticulosis, Crohn's, etc.  He has been able to tolerate food and water well.  He is urinating ok.           The following portions of the patient's history were reviewed and updated as appropriate: allergies, current medications, past family history, past medical history, past social history, past surgical history and problem list.    Review of Systems   Constitutional: Negative for activity change, appetite change, fatigue, fever, unexpected weight gain and unexpected weight loss.   HENT: Negative for swollen glands, trouble swallowing and voice change.    Eyes: Negative for blurred vision and visual disturbance.   Respiratory: Negative for cough and shortness of breath.    Cardiovascular: Negative for chest pain, palpitations and leg swelling.   Gastrointestinal: Positive for diarrhea. Negative for abdominal pain, blood in stool, constipation, nausea, vomiting and  indigestion.   Endocrine: Negative for cold intolerance, heat intolerance, polydipsia and polyphagia.   Genitourinary: Negative for difficulty urinating, dysuria and frequency.   Musculoskeletal: Negative for arthralgias, back pain, joint swelling and neck pain.   Skin: Negative for color change, rash and skin lesions.   Neurological: Negative for dizziness, weakness, headache, memory problem and confusion.   Hematological: Does not bruise/bleed easily.   Psychiatric/Behavioral: Negative for agitation, hallucinations and suicidal ideas. The patient is not nervous/anxious.        Objective   Past Medical History:   Diagnosis Date   • Acid reflux    • Allergic rhinitis    • Chronic mucoid otitis media    • Chronic rhinitis    • Chronic sinusitis    • Coronary artery disease     HEART BYPASS 2004   • Eustachian tube dysfunction    • Heart disease    • Hypertension    • Mixed hearing loss of left ear    • Perianal abscess    • Sensorineural hearing loss    • Tinnitus       Past Surgical History:   Procedure Laterality Date   • CORONARY ARTERY BYPASS GRAFT     • INCISION AND DRAINAGE PERIRECTAL ABSCESS N/A 3/3/2017    Procedure: INCISION AND DRAINAGE OF JEET ANAL ABSCESS;  Surgeon: Lynette Smith MD;  Location: [facility];  Service:    • MYRINGOTOMY W/ TUBES Left 04/17/2017    06/10/2016   • TONSILLECTOMY     • TOTAL HIP ARTHROPLASTY          Current Outpatient Medications:   •  albuterol (PROVENTIL HFA;VENTOLIN HFA) 108 (90 BASE) MCG/ACT inhaler, Every 6 (Six) Hours., Disp: , Rfl:   •  amLODIPine (NORVASC) 10 MG tablet, Take 10 mg by mouth daily, Disp: , Rfl:   •  azelastine (ASTELIN) 0.1 % nasal spray, 2 sprays into each nostril 2 (Two) Times a Day. Use in each nostril as directed, Disp: , Rfl:   •  cetirizine (zyrTEC) 10 MG tablet, Take 10 mg by mouth daily, Disp: , Rfl:   •  CloNIDine (CATAPRES) 0.2 MG tablet, Take 0.2 mg by mouth 3 times daily, Disp: , Rfl:   •  diphenoxylate-atropine (Lomotil) 2.5-0.025 MG per  tablet, Take 1 tablet by mouth As Needed., Disp: , Rfl:   •  fluticasone (FLONASE) 50 MCG/ACT nasal spray, USE 1 SPRAY IN EACH NOSTRIL TWICE A DAY, Disp: 48 g, Rfl: 5  •  magnesium chloride ER (MAG-DELAY) 535 (64 MG) MG CR tablet, Take 64 mg by mouth daily, Disp: , Rfl:   •  Multiple Vitamins-Minerals (PRESERVISION/LUTEIN PO), Take  by mouth., Disp: , Rfl:   •  Omeprazole Magnesium 20.6 (20 BASE) MG capsule delayed-release, Take by mouth daily, Disp: , Rfl:   •  valsartan (DIOVAN) 80 MG tablet, Take 80 mg by mouth daily, Disp: , Rfl:   •  aspirin-acetaminophen-caffeine (EXCEDRIN MIGRAINE) 250-250-65 MG per tablet, Take 1 tablet by mouth Every 6 (Six) Hours As Needed for Headache., Disp: , Rfl:      There were no vitals filed for this visit.      04/24/20  0859   Weight: 77.1 kg (170 lb)         Physical Exam    No physical exam- telephone visit       Assessment/Plan   Diagnoses and all orders for this visit:    1. Acute diarrhea (Primary)    This visit has been rescheduled as a phone visit to comply with patient safety concerns in accordance with CDC recommendations. Total time of discussion was 11-20 minutes.      There seems to be improvement with the appearance and frequency of the stools.  He is tolerating food and water ok and keeping himself hydrated.  There is no pain or fever.  I have advised daily yogurt or a probiotic.  We have discussed appropriate diet changes and certain foods to avoid at this time.  I discussed anti-diarrheals and to avoid if possible at this point.  If there is no improvement by Monday, will need to be seen in the office for further evaluation.  Patient agreeable to this.  I have requested he go to the ER if he is unable to keep fluids in, starts to have abdominal pain, or fever.

## 2020-04-27 ENCOUNTER — OFFICE VISIT (OUTPATIENT)
Dept: INTERNAL MEDICINE | Facility: CLINIC | Age: 70
End: 2020-04-27

## 2020-04-27 ENCOUNTER — TELEPHONE (OUTPATIENT)
Dept: INTERNAL MEDICINE | Facility: CLINIC | Age: 70
End: 2020-04-27

## 2020-04-27 VITALS
WEIGHT: 175 LBS | BODY MASS INDEX: 23.7 KG/M2 | HEART RATE: 84 BPM | SYSTOLIC BLOOD PRESSURE: 238 MMHG | DIASTOLIC BLOOD PRESSURE: 80 MMHG | TEMPERATURE: 99.2 F | HEIGHT: 72 IN

## 2020-04-27 DIAGNOSIS — L30.9 DERMATITIS: Primary | ICD-10-CM

## 2020-04-27 DIAGNOSIS — F41.9 ANXIETY DISORDER, UNSPECIFIED TYPE: ICD-10-CM

## 2020-04-27 DIAGNOSIS — I10 ACCELERATED HYPERTENSION: ICD-10-CM

## 2020-04-27 DIAGNOSIS — A09 DIARRHEA OF INFECTIOUS ORIGIN: Primary | ICD-10-CM

## 2020-04-27 PROCEDURE — 99214 OFFICE O/P EST MOD 30 MIN: CPT | Performed by: INTERNAL MEDICINE

## 2020-04-27 RX ORDER — METRONIDAZOLE 500 MG/1
500 TABLET ORAL 3 TIMES DAILY
Qty: 30 TABLET | Refills: 0 | Status: SHIPPED | OUTPATIENT
Start: 2020-04-27 | End: 2020-05-07

## 2020-04-27 RX ORDER — DIPHENOXYLATE HYDROCHLORIDE AND ATROPINE SULFATE 2.5; .025 MG/1; MG/1
1 TABLET ORAL 4 TIMES DAILY PRN
Qty: 30 TABLET | Refills: 0 | Status: SHIPPED | OUTPATIENT
Start: 2020-04-27 | End: 2020-05-04

## 2020-04-27 RX ORDER — DESOXIMETASONE 2.5 MG/G
CREAM TOPICAL 2 TIMES DAILY
Qty: 60 G | Refills: 0 | Status: SHIPPED | OUTPATIENT
Start: 2020-04-27 | End: 2020-05-18

## 2020-04-27 RX ORDER — CEFDINIR 300 MG/1
300 CAPSULE ORAL 2 TIMES DAILY
Qty: 20 CAPSULE | Refills: 0 | Status: SHIPPED | OUTPATIENT
Start: 2020-04-27 | End: 2020-05-07

## 2020-04-27 NOTE — PROGRESS NOTES
Subjective   Ricardo Hugo is a 70 y.o. male.   Chief Complaint   Patient presents with   • Diarrhea     c/o diarrhea x 12 days.  He called in last week and had a telephone visit with Arlen on Friday.   • Hypertension     States he is BP is high because he is upset, stating he has been trying to call here since last Thursday and just thought he would walk in today.       Patient is having diarrhea for about 12 days he is not noticed blood in his stool there is no dark coloration it simply water.  He has not taken any new medications he has not taken antibiotics recently.  He is also very agitated and upset today apparently he is having difficulty getting through the hub.  Cannot get through to our office he was shaking and trembling he was so angry.  He had recently had a tele-visit where he was having his diarrhea addressed.       The following portions of the patient's history were reviewed and updated as appropriate: allergies, current medications, past family history, past medical history, past social history, past surgical history and problem list.    Review of Systems   Constitutional: Negative for activity change, appetite change, fatigue, fever, unexpected weight gain and unexpected weight loss.   HENT: Negative for swollen glands, trouble swallowing and voice change.    Eyes: Negative for blurred vision and visual disturbance.   Respiratory: Negative for cough and shortness of breath.    Cardiovascular: Negative for chest pain, palpitations and leg swelling.   Gastrointestinal: Positive for diarrhea. Negative for abdominal pain, constipation, nausea, vomiting and indigestion.   Endocrine: Negative for cold intolerance, heat intolerance, polydipsia and polyphagia.   Genitourinary: Negative for dysuria and frequency.   Musculoskeletal: Negative for arthralgias, back pain, joint swelling and neck pain.   Skin: Negative for color change, rash and skin lesions.   Neurological: Negative for dizziness, weakness,  headache, memory problem and confusion.   Hematological: Does not bruise/bleed easily.   Psychiatric/Behavioral: Positive for agitation. Negative for hallucinations and suicidal ideas. The patient is nervous/anxious.        Objective   Past Medical History:   Diagnosis Date   • Acid reflux    • Allergic rhinitis    • Chronic mucoid otitis media    • Chronic rhinitis    • Chronic sinusitis    • Coronary artery disease     HEART BYPASS 2004   • Eustachian tube dysfunction    • Heart disease    • Hypertension    • Mixed hearing loss of left ear    • Perianal abscess    • Sensorineural hearing loss    • Tinnitus       Past Surgical History:   Procedure Laterality Date   • CORONARY ARTERY BYPASS GRAFT     • INCISION AND DRAINAGE PERIRECTAL ABSCESS N/A 3/3/2017    Procedure: INCISION AND DRAINAGE OF JEET ANAL ABSCESS;  Surgeon: Lynette Smith MD;  Location: [facility];  Service:    • MYRINGOTOMY W/ TUBES Left 04/17/2017    06/10/2016   • TONSILLECTOMY     • TOTAL HIP ARTHROPLASTY          Current Outpatient Medications:   •  albuterol (PROVENTIL HFA;VENTOLIN HFA) 108 (90 BASE) MCG/ACT inhaler, Every 6 (Six) Hours., Disp: , Rfl:   •  amLODIPine (NORVASC) 10 MG tablet, Take 10 mg by mouth daily, Disp: , Rfl:   •  aspirin-acetaminophen-caffeine (EXCEDRIN MIGRAINE) 250-250-65 MG per tablet, Take 1 tablet by mouth Every 6 (Six) Hours As Needed for Headache., Disp: , Rfl:   •  azelastine (ASTELIN) 0.1 % nasal spray, 2 sprays into each nostril 2 (Two) Times a Day. Use in each nostril as directed, Disp: , Rfl:   •  cefdinir (OMNICEF) 300 MG capsule, Take 1 capsule by mouth 2 (Two) Times a Day., Disp: 20 capsule, Rfl: 0  •  cetirizine (zyrTEC) 10 MG tablet, Take 10 mg by mouth daily, Disp: , Rfl:   •  CloNIDine (CATAPRES) 0.2 MG tablet, Take 0.2 mg by mouth 3 times daily, Disp: , Rfl:   •  desoximetasone (TOPICORT) 0.25 % cream, Apply  topically to the appropriate area as directed 2 (Two) Times a Day., Disp: 60 g, Rfl: 0  •   diphenoxylate-atropine (Lomotil) 2.5-0.025 MG per tablet, Take 1 tablet by mouth As Needed., Disp: , Rfl:   •  diphenoxylate-atropine (Lomotil) 2.5-0.025 MG per tablet, Take 1 tablet by mouth 4 (Four) Times a Day As Needed for Diarrhea., Disp: 30 tablet, Rfl: 0  •  fluticasone (FLONASE) 50 MCG/ACT nasal spray, USE 1 SPRAY IN EACH NOSTRIL TWICE A DAY, Disp: 48 g, Rfl: 5  •  magnesium chloride ER (MAG-DELAY) 535 (64 MG) MG CR tablet, Take 64 mg by mouth daily, Disp: , Rfl:   •  metroNIDAZOLE (Flagyl) 500 MG tablet, Take 1 tablet by mouth 3 (Three) Times a Day., Disp: 30 tablet, Rfl: 0  •  Multiple Vitamins-Minerals (PRESERVISION/LUTEIN PO), Take  by mouth., Disp: , Rfl:   •  Omeprazole Magnesium 20.6 (20 BASE) MG capsule delayed-release, Take by mouth daily, Disp: , Rfl:   •  valsartan (DIOVAN) 80 MG tablet, Take 80 mg by mouth daily, Disp: , Rfl:      Vitals:    04/27/20 1132   BP: (!) 238/80   Pulse: 84   Temp: 99.2 °F (37.3 °C)         04/27/20  1132   Weight: 79.4 kg (175 lb)     Patient's Body mass index is 23.73 kg/m². BMI is within normal parameters. No follow-up required..      Physical Exam   Constitutional: He is oriented to person, place, and time. He appears well-developed and well-nourished.   HENT:   Head: Normocephalic and atraumatic.   Right Ear: External ear normal.   Left Ear: External ear normal.   Nose: Nose normal.   Mouth/Throat: Oropharynx is clear and moist.   Eyes: Pupils are equal, round, and reactive to light. Conjunctivae and EOM are normal.   Neck: Normal range of motion. Neck supple. No thyromegaly present.   Cardiovascular: Normal rate, regular rhythm, normal heart sounds and intact distal pulses.   Pulmonary/Chest: Effort normal and breath sounds normal.   Abdominal: Soft. Bowel sounds are normal.   Lymphadenopathy:     He has no cervical adenopathy.   Neurological: He is alert and oriented to person, place, and time.   Skin: Skin is warm and dry.   Psychiatric: He has a normal mood and  affect. His behavior is normal. Thought content normal.   Nursing note and vitals reviewed.            Assessment/Plan   Diagnoses and all orders for this visit:    1. Diarrhea of infectious origin (Primary)  -     diphenoxylate-atropine (Lomotil) 2.5-0.025 MG per tablet; Take 1 tablet by mouth 4 (Four) Times a Day As Needed for Diarrhea.  Dispense: 30 tablet; Refill: 0    2. Accelerated hypertension    3. Anxiety disorder, unspecified type    Other orders  -     cefdinir (OMNICEF) 300 MG capsule; Take 1 capsule by mouth 2 (Two) Times a Day.  Dispense: 20 capsule; Refill: 0  -     metroNIDAZOLE (Flagyl) 500 MG tablet; Take 1 tablet by mouth 3 (Three) Times a Day.  Dispense: 30 tablet; Refill: 0      I advised patient with his blood pressure he needs to be hospitalized he is scared to death of COVID-19 states he is in his 70s and he would rather die at home then to be exposed to potential COVID cases.  Patient likely has an infectious source to his diarrhea at this point it is not subsided with conservative measures I have restarted his Lomotil I have also given him antibiotic therapy he has a follow-up visit of her yearly in June we will see him at that point I am going to put him on a recall list and check on him again tomorrow.  I assured he might pass along his concerns about difficulty reaching the office through the hub to those and make those types of decisions.

## 2020-04-28 ENCOUNTER — CLINICAL SUPPORT (OUTPATIENT)
Dept: INTERNAL MEDICINE | Facility: CLINIC | Age: 70
End: 2020-04-28

## 2020-04-28 DIAGNOSIS — R19.7 ACUTE DIARRHEA: Primary | ICD-10-CM

## 2020-04-28 PROCEDURE — 99211 OFF/OP EST MAY X REQ PHY/QHP: CPT | Performed by: INTERNAL MEDICINE

## 2020-04-29 ENCOUNTER — TELEPHONE (OUTPATIENT)
Dept: INTERNAL MEDICINE | Facility: CLINIC | Age: 70
End: 2020-04-29

## 2020-04-29 LAB
ALBUMIN SERPL-MCNC: 3.5 G/DL (ref 3.5–5.2)
ALBUMIN/GLOB SERPL: 1.3 G/DL
ALP SERPL-CCNC: 114 U/L (ref 39–117)
ALT SERPL-CCNC: 73 U/L (ref 1–41)
AST SERPL-CCNC: 63 U/L (ref 1–40)
BASOPHILS # BLD AUTO: ABNORMAL 10*3/UL
BILIRUB SERPL-MCNC: 1 MG/DL (ref 0.2–1.2)
BUN SERPL-MCNC: 17 MG/DL (ref 8–23)
BUN/CREAT SERPL: 13.1 (ref 7–25)
CALCIUM SERPL-MCNC: 8.3 MG/DL (ref 8.6–10.5)
CHLORIDE SERPL-SCNC: 97 MMOL/L (ref 98–107)
CO2 SERPL-SCNC: 19.7 MMOL/L (ref 22–29)
CREAT SERPL-MCNC: 1.3 MG/DL (ref 0.76–1.27)
DIFFERENTIAL COMMENT: ABNORMAL
EOSINOPHIL # BLD AUTO: ABNORMAL 10*3/UL
EOSINOPHIL # BLD MANUAL: 0.12 10*3/MM3 (ref 0–0.4)
EOSINOPHIL NFR BLD AUTO: ABNORMAL %
EOSINOPHIL NFR BLD MANUAL: 1 % (ref 0.3–6.2)
ERYTHROCYTE [DISTWIDTH] IN BLOOD BY AUTOMATED COUNT: 13 % (ref 12.3–15.4)
GLOBULIN SER CALC-MCNC: 2.7 GM/DL
GLUCOSE SERPL-MCNC: 125 MG/DL (ref 65–99)
HCT VFR BLD AUTO: 37.4 % (ref 37.5–51)
HGB BLD-MCNC: 13.1 G/DL (ref 13–17.7)
LYMPHOCYTES # BLD AUTO: ABNORMAL 10*3/UL
LYMPHOCYTES # BLD MANUAL: 0 10*3/MM3 (ref 0.7–3.1)
LYMPHOCYTES NFR BLD AUTO: ABNORMAL %
LYMPHOCYTES NFR BLD MANUAL: 0 % (ref 19.6–45.3)
MCH RBC QN AUTO: 34.3 PG (ref 26.6–33)
MCHC RBC AUTO-ENTMCNC: 35 G/DL (ref 31.5–35.7)
MCV RBC AUTO: 97.9 FL (ref 79–97)
MONOCYTES # BLD MANUAL: 0.95 10*3/MM3 (ref 0.1–0.9)
MONOCYTES NFR BLD AUTO: ABNORMAL %
MONOCYTES NFR BLD MANUAL: 8.2 % (ref 5–12)
NEUTROPHILS # BLD MANUAL: 10.45 10*3/MM3 (ref 1.7–7)
NEUTROPHILS NFR BLD AUTO: ABNORMAL %
NEUTROPHILS NFR BLD MANUAL: 89.8 % (ref 42.7–76)
PLATELET # BLD AUTO: 151 10*3/MM3 (ref 140–450)
PLATELET BLD QL SMEAR: ABNORMAL
POTASSIUM SERPL-SCNC: 3.6 MMOL/L (ref 3.5–5.2)
PROT SERPL-MCNC: 6.2 G/DL (ref 6–8.5)
RBC # BLD AUTO: 3.82 10*6/MM3 (ref 4.14–5.8)
RBC MORPH BLD: ABNORMAL
SODIUM SERPL-SCNC: 135 MMOL/L (ref 136–145)
WBC # BLD AUTO: 11.64 10*3/MM3 (ref 3.4–10.8)

## 2020-04-29 NOTE — TELEPHONE ENCOUNTER
Patient walked into office today to inform us that his BP was 129/72 and temp was 98.4  States diarrhea is better.  Dr. Velasco told him his labs were back and that some liver enzymes were off but he felt it was due to the illness he was having.  Told patient we would recheck things at his next appointment.

## 2020-05-03 DIAGNOSIS — A09 DIARRHEA OF INFECTIOUS ORIGIN: ICD-10-CM

## 2020-05-04 RX ORDER — DIPHENOXYLATE HYDROCHLORIDE AND ATROPINE SULFATE 2.5; .025 MG/1; MG/1
TABLET ORAL
Qty: 20 TABLET | Refills: 0 | Status: SHIPPED | OUTPATIENT
Start: 2020-05-04 | End: 2020-05-07

## 2020-05-07 ENCOUNTER — OFFICE VISIT (OUTPATIENT)
Dept: INTERNAL MEDICINE | Facility: CLINIC | Age: 70
End: 2020-05-07

## 2020-05-07 VITALS
OXYGEN SATURATION: 100 % | DIASTOLIC BLOOD PRESSURE: 90 MMHG | WEIGHT: 184.2 LBS | BODY MASS INDEX: 24.95 KG/M2 | HEART RATE: 82 BPM | HEIGHT: 72 IN | SYSTOLIC BLOOD PRESSURE: 200 MMHG | TEMPERATURE: 97.8 F

## 2020-05-07 DIAGNOSIS — I10 ACCELERATED HYPERTENSION: Primary | ICD-10-CM

## 2020-05-07 DIAGNOSIS — R60.0 BILATERAL LEG EDEMA: ICD-10-CM

## 2020-05-07 PROCEDURE — 99213 OFFICE O/P EST LOW 20 MIN: CPT | Performed by: INTERNAL MEDICINE

## 2020-05-07 RX ORDER — FUROSEMIDE 20 MG/1
20 TABLET ORAL DAILY
Qty: 30 TABLET | Refills: 5 | Status: SHIPPED | OUTPATIENT
Start: 2020-05-07 | End: 2020-06-17

## 2020-05-07 NOTE — PROGRESS NOTES
Subjective   Ricardo Hugo is a 70 y.o. male.   Chief Complaint   Patient presents with   • Generalized Body Aches     from neck down to ankles   • Hypertension       Patient walked into the office today complaining of his leg swelling stated he had stopped some of his medications because he thought that was causing him problems.  He actually stopped his antibiotics and his Lomotil those medications were almost complete at this point.       The following portions of the patient's history were reviewed and updated as appropriate: allergies, current medications, past family history, past medical history, past social history, past surgical history and problem list.    Review of Systems   Constitutional: Negative for activity change, appetite change, fatigue, fever, unexpected weight gain and unexpected weight loss.   HENT: Negative for swollen glands, trouble swallowing and voice change.    Eyes: Negative for blurred vision and visual disturbance.   Respiratory: Negative for cough and shortness of breath.    Cardiovascular: Positive for leg swelling ( 2+ bilateral). Negative for chest pain and palpitations.   Gastrointestinal: Negative for abdominal pain, constipation, diarrhea, nausea, vomiting and indigestion.   Endocrine: Negative for cold intolerance, heat intolerance, polydipsia and polyphagia.   Genitourinary: Negative for dysuria and frequency.   Musculoskeletal: Negative for arthralgias, back pain, joint swelling and neck pain.   Skin: Negative for color change, rash and skin lesions.   Neurological: Negative for dizziness, weakness, headache, memory problem and confusion.   Hematological: Does not bruise/bleed easily.   Psychiatric/Behavioral: Negative for agitation, hallucinations and suicidal ideas. The patient is not nervous/anxious.        Objective   Past Medical History:   Diagnosis Date   • Acid reflux    • Allergic rhinitis    • Chronic mucoid otitis media    • Chronic rhinitis    • Chronic  sinusitis    • Coronary artery disease     HEART BYPASS 2004   • Eustachian tube dysfunction    • Heart disease    • Hypertension    • Mixed hearing loss of left ear    • Perianal abscess    • Sensorineural hearing loss    • Tinnitus       Past Surgical History:   Procedure Laterality Date   • CORONARY ARTERY BYPASS GRAFT     • INCISION AND DRAINAGE PERIRECTAL ABSCESS N/A 3/3/2017    Procedure: INCISION AND DRAINAGE OF JEET ANAL ABSCESS;  Surgeon: Lynette Smith MD;  Location: [facility] OR;  Service:    • MYRINGOTOMY W/ TUBES Left 04/17/2017    06/10/2016   • TONSILLECTOMY     • TOTAL HIP ARTHROPLASTY          Current Outpatient Medications:   •  albuterol (PROVENTIL HFA;VENTOLIN HFA) 108 (90 BASE) MCG/ACT inhaler, Every 6 (Six) Hours., Disp: , Rfl:   •  amLODIPine (NORVASC) 10 MG tablet, Take 10 mg by mouth daily, Disp: , Rfl:   •  aspirin-acetaminophen-caffeine (EXCEDRIN MIGRAINE) 250-250-65 MG per tablet, Take 1 tablet by mouth Every 6 (Six) Hours As Needed for Headache., Disp: , Rfl:   •  azelastine (ASTELIN) 0.1 % nasal spray, 2 sprays into each nostril 2 (Two) Times a Day. Use in each nostril as directed, Disp: , Rfl:   •  cetirizine (zyrTEC) 10 MG tablet, Take 10 mg by mouth daily, Disp: , Rfl:   •  CloNIDine (CATAPRES) 0.2 MG tablet, Take 0.2 mg by mouth 3 times daily, Disp: , Rfl:   •  desoximetasone (TOPICORT) 0.25 % cream, Apply  topically to the appropriate area as directed 2 (Two) Times a Day., Disp: 60 g, Rfl: 0  •  fluticasone (FLONASE) 50 MCG/ACT nasal spray, USE 1 SPRAY IN EACH NOSTRIL TWICE A DAY, Disp: 48 g, Rfl: 5  •  magnesium chloride ER (MAG-DELAY) 535 (64 MG) MG CR tablet, Take 64 mg by mouth daily, Disp: , Rfl:   •  Multiple Vitamins-Minerals (PRESERVISION/LUTEIN PO), Take  by mouth., Disp: , Rfl:   •  Omeprazole Magnesium 20.6 (20 BASE) MG capsule delayed-release, Take by mouth daily, Disp: , Rfl:   •  valsartan (DIOVAN) 80 MG tablet, Take 80 mg by mouth daily, Disp: , Rfl:   •  furosemide  (Lasix) 20 MG tablet, Take 1 tablet by mouth Daily., Disp: 30 tablet, Rfl: 5     Vitals:    05/07/20 1329   BP: (!) 200/90   Pulse:    Temp:    SpO2:          05/07/20  1317   Weight: 83.6 kg (184 lb 3.2 oz)     Patient's Body mass index is 24.98 kg/m². BMI is within normal parameters. No follow-up required..      Physical Exam   Constitutional: He is oriented to person, place, and time. He appears well-developed and well-nourished.   HENT:   Head: Normocephalic and atraumatic.   Right Ear: External ear normal.   Left Ear: External ear normal.   Nose: Nose normal.   Mouth/Throat: Oropharynx is clear and moist.   Eyes: Pupils are equal, round, and reactive to light. Conjunctivae and EOM are normal.   Neck: Normal range of motion. Neck supple. No thyromegaly present.   Cardiovascular: Normal rate, normal heart sounds and intact distal pulses.   Patient has extrasystoles   Pulmonary/Chest: Breath sounds normal.   Visibly short of breath with exertion   Abdominal: Soft. Bowel sounds are normal.   Musculoskeletal: He exhibits edema ( 2+ bilateral).   Lymphadenopathy:     He has no cervical adenopathy.   Neurological: He is alert and oriented to person, place, and time.   Skin: Skin is warm and dry.   Psychiatric: He has a normal mood and affect. His behavior is normal. Thought content normal.   Nursing note and vitals reviewed.            Assessment/Plan   Diagnoses and all orders for this visit:    1. Accelerated hypertension (Primary)    2. Bilateral leg edema    Other orders  -     furosemide (Lasix) 20 MG tablet; Take 1 tablet by mouth Daily.  Dispense: 30 tablet; Refill: 5    At this point I am adding Lasix to controlled his edema this should also help with his blood pressure control.  Harjinder assures me that he is not using alcohol at this point this is a bit disconcerting since when he was here the other day his blood pressure was markedly improved I am not sure he is taking his medication as prescribed.  We will  recheck him in 1 week to include BMP and LFTs.

## 2020-05-12 ENCOUNTER — OFFICE VISIT (OUTPATIENT)
Dept: INTERNAL MEDICINE | Facility: CLINIC | Age: 70
End: 2020-05-12

## 2020-05-12 VITALS
TEMPERATURE: 98.8 F | DIASTOLIC BLOOD PRESSURE: 80 MMHG | SYSTOLIC BLOOD PRESSURE: 142 MMHG | HEIGHT: 72 IN | WEIGHT: 171 LBS | BODY MASS INDEX: 23.16 KG/M2 | HEART RATE: 73 BPM | OXYGEN SATURATION: 100 %

## 2020-05-12 DIAGNOSIS — I10 ACCELERATED HYPERTENSION: Primary | ICD-10-CM

## 2020-05-12 DIAGNOSIS — A09 DIARRHEA OF INFECTIOUS ORIGIN: ICD-10-CM

## 2020-05-12 DIAGNOSIS — R60.0 BILATERAL LEG EDEMA: ICD-10-CM

## 2020-05-12 PROCEDURE — 99213 OFFICE O/P EST LOW 20 MIN: CPT | Performed by: INTERNAL MEDICINE

## 2020-05-12 RX ORDER — FEXOFENADINE HCL 180 MG/1
180 TABLET ORAL DAILY
COMMUNITY
End: 2021-11-09

## 2020-05-12 RX ORDER — ASPIRIN 81 MG/1
81 TABLET ORAL DAILY
COMMUNITY
End: 2021-11-03 | Stop reason: HOSPADM

## 2020-05-12 NOTE — PROGRESS NOTES
Subjective   Ricardo Hugo is a 70 y.o. male.   Chief Complaint   Patient presents with   • Hypertension     FU on BP   • Diarrhea     States diarrhea has completely resolved and he feels great       This is a follow-up visit on patient for colitis with associated diarrhea which has resolved.  Also for hypertension which was out of control and accelerated.  Patient also had tremendous edema likely secondary to amlodipine.  He has been taking a diuretic and that is helped tremendously.       The following portions of the patient's history were reviewed and updated as appropriate: allergies, current medications, past family history, past medical history, past social history, past surgical history and problem list.    Review of Systems   Constitutional: Negative for activity change, appetite change, fatigue, fever, unexpected weight gain and unexpected weight loss.   HENT: Negative for swollen glands, trouble swallowing and voice change.    Eyes: Negative for blurred vision and visual disturbance.   Respiratory: Negative for cough and shortness of breath.    Cardiovascular: Negative for chest pain, palpitations and leg swelling.   Gastrointestinal: Negative for abdominal pain, constipation, diarrhea, nausea, vomiting and indigestion.   Endocrine: Negative for cold intolerance, heat intolerance, polydipsia and polyphagia.   Genitourinary: Negative for dysuria and frequency.   Musculoskeletal: Negative for arthralgias, back pain, joint swelling and neck pain.   Skin: Negative for color change, rash and skin lesions.   Neurological: Negative for dizziness, weakness, headache, memory problem and confusion.   Hematological: Does not bruise/bleed easily.   Psychiatric/Behavioral: Negative for agitation, hallucinations and suicidal ideas. The patient is not nervous/anxious.        Objective   Past Medical History:   Diagnosis Date   • Acid reflux    • Allergic rhinitis    • Chronic mucoid otitis media    • Chronic rhinitis     • Chronic sinusitis    • Coronary artery disease     HEART BYPASS 2004   • Eustachian tube dysfunction    • Heart disease    • Hypertension    • Mixed hearing loss of left ear    • Perianal abscess    • Sensorineural hearing loss    • Tinnitus       Past Surgical History:   Procedure Laterality Date   • CORONARY ARTERY BYPASS GRAFT     • INCISION AND DRAINAGE PERIRECTAL ABSCESS N/A 3/3/2017    Procedure: INCISION AND DRAINAGE OF JEET ANAL ABSCESS;  Surgeon: Lynette Smith MD;  Location: [facility] OR;  Service:    • MYRINGOTOMY W/ TUBES Left 04/17/2017    06/10/2016   • TONSILLECTOMY     • TOTAL HIP ARTHROPLASTY          Current Outpatient Medications:   •  albuterol (PROVENTIL HFA;VENTOLIN HFA) 108 (90 BASE) MCG/ACT inhaler, Every 6 (Six) Hours., Disp: , Rfl:   •  amLODIPine (NORVASC) 10 MG tablet, Take 10 mg by mouth daily, Disp: , Rfl:   •  aspirin (ASPIR) 81 MG EC tablet, Take 81 mg by mouth Daily., Disp: , Rfl:   •  aspirin-acetaminophen-caffeine (EXCEDRIN MIGRAINE) 250-250-65 MG per tablet, Take 1 tablet by mouth Every 6 (Six) Hours As Needed for Headache., Disp: , Rfl:   •  azelastine (ASTELIN) 0.1 % nasal spray, 2 sprays into each nostril 2 (Two) Times a Day. Use in each nostril as directed, Disp: , Rfl:   •  CloNIDine (CATAPRES) 0.2 MG tablet, Take 0.2 mg by mouth 3 times daily, Disp: , Rfl:   •  desoximetasone (TOPICORT) 0.25 % cream, Apply  topically to the appropriate area as directed 2 (Two) Times a Day., Disp: 60 g, Rfl: 0  •  fexofenadine (ALLEGRA) 180 MG tablet, Take 180 mg by mouth Daily., Disp: , Rfl:   •  fluticasone (FLONASE) 50 MCG/ACT nasal spray, USE 1 SPRAY IN EACH NOSTRIL TWICE A DAY, Disp: 48 g, Rfl: 5  •  furosemide (Lasix) 20 MG tablet, Take 1 tablet by mouth Daily. (Patient taking differently: Take 20 mg by mouth Daily As Needed.), Disp: 30 tablet, Rfl: 5  •  magnesium chloride ER (MAG-DELAY) 535 (64 MG) MG CR tablet, Take 64 mg by mouth daily, Disp: , Rfl:   •  Multiple  Vitamins-Minerals (PRESERVISION/LUTEIN PO), Take  by mouth., Disp: , Rfl:   •  Omeprazole Magnesium 20.6 (20 BASE) MG capsule delayed-release, Take by mouth daily, Disp: , Rfl:   •  valsartan (DIOVAN) 80 MG tablet, Take 160 mg by mouth Daily., Disp: , Rfl:      Vitals:    05/12/20 1515   BP: 142/80   Pulse: 73   Temp: 98.8 °F (37.1 °C)   SpO2: 100%         05/12/20  1515   Weight: 77.6 kg (171 lb)     Patient's Body mass index is 23.19 kg/m². BMI is within normal parameters. No follow-up required..      Physical Exam   Constitutional: He is oriented to person, place, and time. He appears well-developed and well-nourished.   HENT:   Head: Normocephalic and atraumatic.   Right Ear: External ear normal.   Left Ear: External ear normal.   Nose: Nose normal.   Mouth/Throat: Oropharynx is clear and moist.   Eyes: Pupils are equal, round, and reactive to light. Conjunctivae and EOM are normal.   Neck: Normal range of motion. Neck supple. No thyromegaly present.   Cardiovascular: Normal rate, regular rhythm, normal heart sounds and intact distal pulses.   Pulmonary/Chest: Effort normal and breath sounds normal.   Abdominal: Soft. Bowel sounds are normal.   Musculoskeletal: He exhibits edema ( 1+ both legs).   Lymphadenopathy:     He has no cervical adenopathy.   Neurological: He is alert and oriented to person, place, and time.   Skin: Skin is warm and dry.   Psychiatric: He has a normal mood and affect. His behavior is normal. Thought content normal.   Nursing note and vitals reviewed.            Assessment/Plan   Diagnoses and all orders for this visit:    1. Accelerated hypertension (Primary)  -     Basic Metabolic Panel    2. Diarrhea of infectious origin    3. Bilateral leg edema      Patient's accelerated hypertension is now brought under control with the use of current medications.  He is in need of having a BMP.  His diarrhea is resolved completely.  His bilateral leg edema likely secondary to the amlodipine has  improved tremendously with Lasix.  Patient needs a BMP due to his diuretic therapy.

## 2020-05-13 LAB
BUN SERPL-MCNC: 14 MG/DL (ref 8–23)
BUN/CREAT SERPL: 17.7 (ref 7–25)
CALCIUM SERPL-MCNC: 8.9 MG/DL (ref 8.6–10.5)
CHLORIDE SERPL-SCNC: 95 MMOL/L (ref 98–107)
CO2 SERPL-SCNC: 28.4 MMOL/L (ref 22–29)
CREAT SERPL-MCNC: 0.79 MG/DL (ref 0.76–1.27)
GLUCOSE SERPL-MCNC: 101 MG/DL (ref 65–99)
POTASSIUM SERPL-SCNC: 3.6 MMOL/L (ref 3.5–5.2)
SODIUM SERPL-SCNC: 137 MMOL/L (ref 136–145)

## 2020-05-16 DIAGNOSIS — L30.9 DERMATITIS: ICD-10-CM

## 2020-05-17 DIAGNOSIS — A09 DIARRHEA OF INFECTIOUS ORIGIN: ICD-10-CM

## 2020-05-18 ENCOUNTER — OFFICE VISIT (OUTPATIENT)
Dept: INTERNAL MEDICINE | Facility: CLINIC | Age: 70
End: 2020-05-18

## 2020-05-18 VITALS
TEMPERATURE: 98.8 F | OXYGEN SATURATION: 98 % | HEART RATE: 62 BPM | HEIGHT: 72 IN | SYSTOLIC BLOOD PRESSURE: 166 MMHG | WEIGHT: 168 LBS | DIASTOLIC BLOOD PRESSURE: 84 MMHG | BODY MASS INDEX: 22.75 KG/M2

## 2020-05-18 DIAGNOSIS — I10 ACCELERATED HYPERTENSION: ICD-10-CM

## 2020-05-18 DIAGNOSIS — A09 DIARRHEA OF INFECTIOUS ORIGIN: Primary | ICD-10-CM

## 2020-05-18 PROCEDURE — 99214 OFFICE O/P EST MOD 30 MIN: CPT | Performed by: INTERNAL MEDICINE

## 2020-05-18 RX ORDER — DIPHENOXYLATE HYDROCHLORIDE AND ATROPINE SULFATE 2.5; .025 MG/1; MG/1
1 TABLET ORAL 4 TIMES DAILY PRN
Qty: 30 TABLET | Refills: 0 | Status: SHIPPED | OUTPATIENT
Start: 2020-05-18 | End: 2020-05-27 | Stop reason: SDUPTHER

## 2020-05-18 RX ORDER — METOPROLOL SUCCINATE 25 MG/1
25 TABLET, EXTENDED RELEASE ORAL DAILY
Qty: 30 TABLET | Refills: 5 | Status: SHIPPED | OUTPATIENT
Start: 2020-05-18 | End: 2020-12-02 | Stop reason: SDUPTHER

## 2020-05-18 RX ORDER — DIPHENOXYLATE HYDROCHLORIDE AND ATROPINE SULFATE 2.5; .025 MG/1; MG/1
TABLET ORAL
Qty: 20 TABLET | OUTPATIENT
Start: 2020-05-18

## 2020-05-18 RX ORDER — DESOXIMETASONE 2.5 MG/G
CREAM TOPICAL
Qty: 60 G | Refills: 0 | Status: SHIPPED | OUTPATIENT
Start: 2020-05-18 | End: 2020-05-26

## 2020-05-18 NOTE — PROGRESS NOTES
Subjective   Ricardo Hugo is a 70 y.o. male.   Chief Complaint   Patient presents with   • Diarrhea     patient states been going on 8 weeks.       Patient is here stating that he is having diarrhea states his diarrhea never left despite having told his last time that he was having no further diarrhea and felt great.  He states that he is almost out of his Lomotil which makes me think that the Lomotil quieted his diarrhea and is he is begin to wean off the his diarrhea has once again resurfaced.  He is not having abdominal pain he denies any passage of blood.  Describes his bowel movements is coffee-ground brown.       The following portions of the patient's history were reviewed and updated as appropriate: allergies, current medications, past family history, past medical history, past social history, past surgical history and problem list.    Review of Systems   Constitutional: Negative for activity change, appetite change, fatigue, fever, unexpected weight gain and unexpected weight loss.   HENT: Negative for swollen glands, trouble swallowing and voice change.    Eyes: Negative for blurred vision and visual disturbance.   Respiratory: Negative for cough and shortness of breath.    Cardiovascular: Negative for chest pain, palpitations and leg swelling.   Gastrointestinal: Positive for diarrhea. Negative for abdominal pain, constipation, nausea, vomiting and indigestion.   Endocrine: Negative for cold intolerance, heat intolerance, polydipsia and polyphagia.   Genitourinary: Negative for dysuria and frequency.   Musculoskeletal: Negative for arthralgias, back pain, joint swelling and neck pain.   Skin: Negative for color change, rash and skin lesions.   Neurological: Negative for dizziness, weakness, headache, memory problem and confusion.   Hematological: Does not bruise/bleed easily.   Psychiatric/Behavioral: Negative for agitation, hallucinations and suicidal ideas. The patient is not nervous/anxious.         Objective   Past Medical History:   Diagnosis Date   • Acid reflux    • Allergic rhinitis    • Chronic mucoid otitis media    • Chronic rhinitis    • Chronic sinusitis    • Coronary artery disease     HEART BYPASS 2004   • Eustachian tube dysfunction    • Heart disease    • Hypertension    • Mixed hearing loss of left ear    • Perianal abscess    • Sensorineural hearing loss    • Tinnitus       Past Surgical History:   Procedure Laterality Date   • CORONARY ARTERY BYPASS GRAFT     • INCISION AND DRAINAGE PERIRECTAL ABSCESS N/A 3/3/2017    Procedure: INCISION AND DRAINAGE OF JEET ANAL ABSCESS;  Surgeon: Lynette Smith MD;  Location: [facility] OR;  Service:    • MYRINGOTOMY W/ TUBES Left 04/17/2017    06/10/2016   • TONSILLECTOMY     • TOTAL HIP ARTHROPLASTY          Current Outpatient Medications:   •  albuterol (PROVENTIL HFA;VENTOLIN HFA) 108 (90 BASE) MCG/ACT inhaler, Every 6 (Six) Hours., Disp: , Rfl:   •  amLODIPine (NORVASC) 10 MG tablet, Take 10 mg by mouth daily, Disp: , Rfl:   •  aspirin (ASPIR) 81 MG EC tablet, Take 81 mg by mouth Daily., Disp: , Rfl:   •  aspirin-acetaminophen-caffeine (EXCEDRIN MIGRAINE) 250-250-65 MG per tablet, Take 1 tablet by mouth Every 6 (Six) Hours As Needed for Headache., Disp: , Rfl:   •  azelastine (ASTELIN) 0.1 % nasal spray, 2 sprays into each nostril 2 (Two) Times a Day. Use in each nostril as directed, Disp: , Rfl:   •  CloNIDine (CATAPRES) 0.2 MG tablet, Take 0.2 mg by mouth 3 times daily, Disp: , Rfl:   •  desoximetasone (TOPICORT) 0.25 % cream, APPLY TO THE AFFECTED AREA TWICE DAILY AS DIRECTED, Disp: 60 g, Rfl: 0  •  fexofenadine (ALLEGRA) 180 MG tablet, Take 180 mg by mouth Daily., Disp: , Rfl:   •  fluticasone (FLONASE) 50 MCG/ACT nasal spray, USE 1 SPRAY IN EACH NOSTRIL TWICE A DAY, Disp: 48 g, Rfl: 5  •  furosemide (Lasix) 20 MG tablet, Take 1 tablet by mouth Daily. (Patient taking differently: Take 20 mg by mouth Daily As Needed.), Disp: 30 tablet, Rfl: 5  •   magnesium chloride ER (MAG-DELAY) 535 (64 MG) MG CR tablet, Take 64 mg by mouth daily, Disp: , Rfl:   •  Multiple Vitamins-Minerals (PRESERVISION/LUTEIN PO), Take  by mouth., Disp: , Rfl:   •  Omeprazole Magnesium 20.6 (20 BASE) MG capsule delayed-release, Take by mouth daily, Disp: , Rfl:   •  valsartan (DIOVAN) 80 MG tablet, Take 160 mg by mouth Daily., Disp: , Rfl:   •  metoprolol succinate XL (TOPROL-XL) 25 MG 24 hr tablet, Take 1 tablet by mouth Daily., Disp: 30 tablet, Rfl: 5     Vitals:    05/18/20 1326   BP: 166/84   Pulse: 62   Temp: 98.8 °F (37.1 °C)   SpO2: 98%         05/18/20  1326   Weight: 76.2 kg (168 lb)     Patient's Body mass index is 22.78 kg/m². BMI is within normal parameters. No follow-up required..      Physical Exam   Constitutional: He is oriented to person, place, and time. He appears well-developed and well-nourished.   HENT:   Head: Normocephalic and atraumatic.   Right Ear: External ear normal.   Left Ear: External ear normal.   Nose: Nose normal.   Mouth/Throat: Oropharynx is clear and moist.   Eyes: Pupils are equal, round, and reactive to light. Conjunctivae and EOM are normal.   Neck: Normal range of motion. Neck supple. No thyromegaly present.   Cardiovascular: Normal rate, normal heart sounds and intact distal pulses. An irregular rhythm present.   Patient is mostly regular but has frequent extrasystoles.   Pulmonary/Chest: Effort normal and breath sounds normal.   Abdominal: Soft. Bowel sounds are normal.   Lymphadenopathy:     He has no cervical adenopathy.   Neurological: He is alert and oriented to person, place, and time.   Skin: Skin is warm and dry.   Psychiatric: He has a normal mood and affect. His behavior is normal. Thought content normal.   Nursing note and vitals reviewed.            Assessment/Plan   Diagnoses and all orders for this visit:    1. Diarrhea of infectious origin (Primary)  -     Ambulatory Referral to Gastroenterology    2. Accelerated  hypertension    Other orders  -     metoprolol succinate XL (TOPROL-XL) 25 MG 24 hr tablet; Take 1 tablet by mouth Daily.  Dispense: 30 tablet; Refill: 5  -     diphenoxylate-atropine (LOMOTIL) 2.5-0.025 MG per tablet; Take 1 tablet by mouth 4 (Four) Times a Day As Needed for Diarrhea.  Dispense: 30 tablet; Refill: 0      At this point patient has redeveloped diarrhea he now tells me that his diarrhea never stopped however I have noted and ask last time whether he had diarrhea and he stated it was all clear he feels wonderful he has basically changed his story.  He has asked for a referral to GI at [facility] states that they always do his colonoscopies.  He is requesting a refill on the Lomotil I am also giving him a prescription for metoprolol since his blood pressure is high and he is having some extrasystoles again.

## 2020-05-26 DIAGNOSIS — L30.9 DERMATITIS: ICD-10-CM

## 2020-05-26 RX ORDER — DESOXIMETASONE 2.5 MG/G
CREAM TOPICAL
Qty: 60 G | Refills: 0 | Status: ON HOLD | OUTPATIENT
Start: 2020-05-26 | End: 2020-07-12

## 2020-05-27 ENCOUNTER — OFFICE VISIT (OUTPATIENT)
Dept: INTERNAL MEDICINE | Facility: CLINIC | Age: 70
End: 2020-05-27

## 2020-05-27 ENCOUNTER — HOSPITAL ENCOUNTER (OUTPATIENT)
Dept: CT IMAGING | Facility: HOSPITAL | Age: 70
Discharge: HOME | End: 2020-05-27
Admitting: INTERNAL MEDICINE

## 2020-05-27 VITALS
BODY MASS INDEX: 22.35 KG/M2 | TEMPERATURE: 98.9 F | DIASTOLIC BLOOD PRESSURE: 74 MMHG | HEIGHT: 72 IN | SYSTOLIC BLOOD PRESSURE: 170 MMHG | WEIGHT: 165 LBS | OXYGEN SATURATION: 99 % | HEART RATE: 81 BPM

## 2020-05-27 DIAGNOSIS — K55.9 ISCHEMIC COLITIS (HCC): Primary | ICD-10-CM

## 2020-05-27 DIAGNOSIS — K55.9 ISCHEMIC COLITIS (HCC): ICD-10-CM

## 2020-05-27 DIAGNOSIS — A09 DIARRHEA OF INFECTIOUS ORIGIN: ICD-10-CM

## 2020-05-27 LAB — CREAT BLDA-MCNC: 1 MG/DL (ref 0.6–1.3)

## 2020-05-27 PROCEDURE — 74178 CT ABD&PLV WO CNTR FLWD CNTR: CPT

## 2020-05-27 PROCEDURE — 82565 ASSAY OF CREATININE: CPT

## 2020-05-27 PROCEDURE — 25010000002 IOPAMIDOL 61 % SOLUTION: Performed by: INTERNAL MEDICINE

## 2020-05-27 PROCEDURE — 99214 OFFICE O/P EST MOD 30 MIN: CPT | Performed by: INTERNAL MEDICINE

## 2020-05-27 RX ORDER — SILDENAFIL CITRATE 20 MG/1
TABLET ORAL
Status: ON HOLD | COMMUNITY
Start: 2020-05-16 | End: 2020-07-12

## 2020-05-27 RX ORDER — DIPHENOXYLATE HYDROCHLORIDE AND ATROPINE SULFATE 2.5; .025 MG/1; MG/1
1 TABLET ORAL 4 TIMES DAILY PRN
Qty: 30 TABLET | Refills: 0 | Status: SHIPPED | OUTPATIENT
Start: 2020-05-27 | End: 2020-06-09

## 2020-05-27 RX ADMIN — IOPAMIDOL 100 ML: 612 INJECTION, SOLUTION INTRAVENOUS at 13:04

## 2020-05-27 RX ADMIN — IOPAMIDOL 50 ML: 612 INJECTION, SOLUTION INTRAVENOUS at 13:04

## 2020-05-27 NOTE — PROGRESS NOTES
Subjective   Ricardo Hugo is a 70 y.o. male.   Chief Complaint   Patient presents with   • Diarrhea     Still c/o watery diarrhea, has been 3 times this morning.  We have sent a referral to  GI last week, but they have not contacted him yet and he is asking to go to [facility] as he is desperate to find the problem.  Asking for refill on Lomotil, though he says it doesn't help.       Patient is here complaining of persistent diarrhea we have seen Mr. Hugo several times for similar symptoms he was most recently referred to [facility].  He has not gotten a call back from them at this point is now requesting to be referred to [facility] group.  Our presumption is that he has some degree of ischemic colitis.  I am sending him for a CAT scan today to further evaluate his diarrhea.       The following portions of the patient's history were reviewed and updated as appropriate: allergies, current medications, past family history, past medical history, past social history, past surgical history and problem list.    Review of Systems   Constitutional: Negative for activity change, appetite change, fatigue, fever, unexpected weight gain and unexpected weight loss.   HENT: Negative for swollen glands, trouble swallowing and voice change.    Eyes: Negative for blurred vision and visual disturbance.   Respiratory: Negative for cough and shortness of breath.    Cardiovascular: Negative for chest pain, palpitations and leg swelling.   Gastrointestinal: Positive for diarrhea ( 3-4 times daily watery). Negative for abdominal pain, constipation, nausea, vomiting and indigestion.   Endocrine: Negative for cold intolerance, heat intolerance, polydipsia and polyphagia.   Genitourinary: Negative for dysuria and frequency.   Musculoskeletal: Negative for arthralgias, back pain, joint swelling and neck pain.   Skin: Negative for color change, rash and skin lesions.   Neurological: Negative for dizziness, weakness, headache,  memory problem and confusion.   Hematological: Does not bruise/bleed easily.   Psychiatric/Behavioral: Negative for agitation, hallucinations and suicidal ideas. The patient is not nervous/anxious.        Objective   Past Medical History:   Diagnosis Date   • Acid reflux    • Allergic rhinitis    • Chronic mucoid otitis media    • Chronic rhinitis    • Chronic sinusitis    • Coronary artery disease     HEART BYPASS 2004   • Eustachian tube dysfunction    • Heart disease    • Hypertension    • Mixed hearing loss of left ear    • Perianal abscess    • Sensorineural hearing loss    • Tinnitus       Past Surgical History:   Procedure Laterality Date   • CORONARY ARTERY BYPASS GRAFT     • INCISION AND DRAINAGE PERIRECTAL ABSCESS N/A 3/3/2017    Procedure: INCISION AND DRAINAGE OF JEET ANAL ABSCESS;  Surgeon: Lynette Smith MD;  Location: [facility] OR;  Service:    • MYRINGOTOMY W/ TUBES Left 04/17/2017    06/10/2016   • TONSILLECTOMY     • TOTAL HIP ARTHROPLASTY          Current Outpatient Medications:   •  albuterol (PROVENTIL HFA;VENTOLIN HFA) 108 (90 BASE) MCG/ACT inhaler, Every 6 (Six) Hours., Disp: , Rfl:   •  amLODIPine (NORVASC) 10 MG tablet, Take 10 mg by mouth daily, Disp: , Rfl:   •  aspirin (ASPIR) 81 MG EC tablet, Take 81 mg by mouth Daily., Disp: , Rfl:   •  aspirin-acetaminophen-caffeine (EXCEDRIN MIGRAINE) 250-250-65 MG per tablet, Take 1 tablet by mouth Every 6 (Six) Hours As Needed for Headache., Disp: , Rfl:   •  azelastine (ASTELIN) 0.1 % nasal spray, 2 sprays into each nostril 2 (Two) Times a Day. Use in each nostril as directed, Disp: , Rfl:   •  CloNIDine (CATAPRES) 0.2 MG tablet, Take 0.2 mg by mouth 3 times daily, Disp: , Rfl:   •  desoximetasone (TOPICORT) 0.25 % cream, APPLY EXTERNALLY TO THE AFFECTED AREA TWICE DAILY AS DIRECTED, Disp: 60 g, Rfl: 0  •  diphenoxylate-atropine (LOMOTIL) 2.5-0.025 MG per tablet, Take 1 tablet by mouth 4 (Four) Times a Day As Needed for Diarrhea., Disp: 30 tablet,  Rfl: 0  •  fexofenadine (ALLEGRA) 180 MG tablet, Take 180 mg by mouth Daily., Disp: , Rfl:   •  fluticasone (FLONASE) 50 MCG/ACT nasal spray, USE 1 SPRAY IN EACH NOSTRIL TWICE A DAY, Disp: 48 g, Rfl: 5  •  furosemide (Lasix) 20 MG tablet, Take 1 tablet by mouth Daily. (Patient taking differently: Take 20 mg by mouth Daily As Needed.), Disp: 30 tablet, Rfl: 5  •  magnesium chloride ER (MAG-DELAY) 535 (64 MG) MG CR tablet, Take 64 mg by mouth daily, Disp: , Rfl:   •  metoprolol succinate XL (TOPROL-XL) 25 MG 24 hr tablet, Take 1 tablet by mouth Daily., Disp: 30 tablet, Rfl: 5  •  Multiple Vitamins-Minerals (PRESERVISION/LUTEIN PO), Take  by mouth., Disp: , Rfl:   •  Omeprazole Magnesium 20.6 (20 BASE) MG capsule delayed-release, Take by mouth daily, Disp: , Rfl:   •  sildenafil (REVATIO) 20 MG tablet, TAKE 2 3 TABLETS BY MOUTH 30 MINS PRIOR TO INTERCOURSE, Disp: , Rfl:   •  valsartan (DIOVAN) 80 MG tablet, Take 160 mg by mouth Daily., Disp: , Rfl:      Vitals:    05/27/20 1037   BP: 170/74   Pulse: 81   Temp: 98.9 °F (37.2 °C)   SpO2: 99%         05/27/20  1037   Weight: 74.8 kg (165 lb)     Patient's Body mass index is 22.38 kg/m². BMI is within normal parameters. No follow-up required..      Physical Exam   Constitutional: He is oriented to person, place, and time. He appears well-developed and well-nourished.   HENT:   Head: Normocephalic and atraumatic.   Right Ear: External ear normal.   Left Ear: External ear normal.   Nose: Nose normal.   Mouth/Throat: Oropharynx is clear and moist.   Eyes: Pupils are equal, round, and reactive to light. Conjunctivae and EOM are normal.   Neck: Normal range of motion. Neck supple. No thyromegaly present.   Cardiovascular: Normal rate, regular rhythm, normal heart sounds and intact distal pulses.   Pulmonary/Chest: Effort normal and breath sounds normal.   Abdominal: Soft. Bowel sounds are normal.   Lymphadenopathy:     He has no cervical adenopathy.   Neurological: He is alert  and oriented to person, place, and time.   Skin: Skin is warm and dry.   Psychiatric: He has a normal mood and affect. His behavior is normal. Thought content normal.   Nursing note and vitals reviewed.            Assessment/Plan   Diagnoses and all orders for this visit:    1. Ischemic colitis (CMS/HCC) (Primary)  -     Ambulatory Referral to Gastroenterology  -     CT Abdomen Pelvis With & Without Contrast; Future    2. Diarrhea of infectious origin  -     diphenoxylate-atropine (LOMOTIL) 2.5-0.025 MG per tablet; Take 1 tablet by mouth 4 (Four) Times a Day As Needed for Diarrhea.  Dispense: 30 tablet; Refill: 0      Suspicion is that this diarrhea secondary to ischemic colitis we are sending him for CAT scan I have refilled his Lomotil and referral to GI group has been sent.    Patient's blood pressure remains elevated I suspect he is not fully absorbing some of his blood pressure medications he tells me his blood pressures at home are running systolic in the 130 or less.  Harjinder is a rather anxious individual as well which may be contributing to his elevated blood pressure readings in the office.

## 2020-05-29 DIAGNOSIS — K40.90 RIGHT INGUINAL HERNIA: Primary | ICD-10-CM

## 2020-06-04 ENCOUNTER — LAB (OUTPATIENT)
Dept: LAB | Facility: HOSPITAL | Age: 70
End: 2020-06-04

## 2020-06-04 ENCOUNTER — OFFICE VISIT (OUTPATIENT)
Dept: GASTROENTEROLOGY | Facility: CLINIC | Age: 70
End: 2020-06-04

## 2020-06-04 VITALS
TEMPERATURE: 98.7 F | WEIGHT: 171 LBS | HEART RATE: 74 BPM | DIASTOLIC BLOOD PRESSURE: 62 MMHG | OXYGEN SATURATION: 99 % | SYSTOLIC BLOOD PRESSURE: 146 MMHG | HEIGHT: 72 IN | BODY MASS INDEX: 23.16 KG/M2

## 2020-06-04 DIAGNOSIS — Z86.010 HISTORY OF ADENOMATOUS POLYP OF COLON: ICD-10-CM

## 2020-06-04 DIAGNOSIS — R19.7 DIARRHEA, UNSPECIFIED TYPE: ICD-10-CM

## 2020-06-04 DIAGNOSIS — R19.7 DIARRHEA, UNSPECIFIED TYPE: Primary | ICD-10-CM

## 2020-06-04 LAB
ADV 40+41 DNA STL QL NAA+NON-PROBE: NOT DETECTED
ASTRO TYP 1-8 RNA STL QL NAA+NON-PROBE: NOT DETECTED
C CAYETANENSIS DNA STL QL NAA+NON-PROBE: NOT DETECTED
C DIFF TOX GENS STL QL NAA+PROBE: NEGATIVE
CAMPY SP DNA.DIARRHEA STL QL NAA+PROBE: NOT DETECTED
CRYPTOSP STL CULT: NOT DETECTED
E COLI DNA SPEC QL NAA+PROBE: NOT DETECTED
E HISTOLYT AG STL-ACNC: NOT DETECTED
EAEC PAA PLAS AGGR+AATA ST NAA+NON-PRB: NOT DETECTED
EC STX1 + STX2 GENES STL NAA+PROBE: NOT DETECTED
EPEC EAE GENE STL QL NAA+NON-PROBE: NOT DETECTED
ETEC LTA+ST1A+ST1B TOX ST NAA+NON-PROBE: NOT DETECTED
G LAMBLIA DNA SPEC QL NAA+PROBE: NOT DETECTED
NOROVIRUS GI+II RNA STL QL NAA+NON-PROBE: NOT DETECTED
P SHIGELLOIDES DNA STL QL NAA+PROBE: NOT DETECTED
RV RNA STL NAA+PROBE: NOT DETECTED
SALMONELLA DNA SPEC QL NAA+PROBE: NOT DETECTED
SAPO I+II+IV+V RNA STL QL NAA+NON-PROBE: NOT DETECTED
SHIGELLA SP+EIEC IPAH STL QL NAA+PROBE: NOT DETECTED
TSH SERPL DL<=0.05 MIU/L-ACNC: 3.7 UIU/ML (ref 0.27–4.2)
V CHOLERAE DNA SPEC QL NAA+PROBE: NOT DETECTED
VIBRIO DNA SPEC NAA+PROBE: NOT DETECTED
YERSINIA STL CULT: NOT DETECTED

## 2020-06-04 PROCEDURE — 99214 OFFICE O/P EST MOD 30 MIN: CPT | Performed by: NURSE PRACTITIONER

## 2020-06-04 PROCEDURE — 0097U HC BIOFIRE FILMARRAY GI PANEL: CPT | Performed by: NURSE PRACTITIONER

## 2020-06-04 PROCEDURE — 87493 C DIFF AMPLIFIED PROBE: CPT | Performed by: NURSE PRACTITIONER

## 2020-06-04 PROCEDURE — 36415 COLL VENOUS BLD VENIPUNCTURE: CPT | Performed by: NURSE PRACTITIONER

## 2020-06-04 PROCEDURE — 84443 ASSAY THYROID STIM HORMONE: CPT | Performed by: NURSE PRACTITIONER

## 2020-06-04 NOTE — PROGRESS NOTES
[facility] GASTROENTEROLOGY - OFFICE NOTE    6/4/2020    Ricardo Hugo   1950    Primary Physician: Joe Velasco MD    Chief Complaint   Patient presents with   • Diarrhea         HISTORY OF PRESENT ILLNESS:    Ricardo Hugo is a 70 y.o. male presents with diarrhea. This started 2/2020. Occurs daily. He is having at least 5 stools per day. Stools are all loose and watery. No solid stool during this time. He took omnicef and flagyl 4-27-20 prescribed by his pcp and did not finish.   The diarrhea did not improve. He is taking lomotil and is not helping. Prior to the diarrhea starting he had one solid stool per day.  No rectal bleeding. No fever. No abdominal pain. No wt loss.  No recent travel.        His pcp recommended he see a surgeon for the hernia noted on ct abdomen/pelvis recently.  The patient states that he is not going to be seeing a surgeon because he is not having pain.  States that he has had a hernia for several years.      CT abdomen/pelvis with and without contrast 5-27-20  IMPRESSION:  1. No acute process identified in the abdomen or pelvis.  2. Small bowel containing right inguinal hernia. No evidence of current  high-grade obstruction; however, cannot exclude intermittent and/or  partial obstruction as a cause for abdominal distention. Of note, the  administered oral contrast extends down to the level of this inguinal  hernia sac, but did not extend beyond this hernia during today's exam.  This may be additional evidence towards a significant functional  narrowing at this site.  3. Mild scarring in the lung bases.  4. Left upper pole renal cortical scar.  5. Advanced atheromatous calcification, with moderate or perhaps  high-grade atheromatous narrowing of both common iliac arteries.  6. 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  This report was finalized on 05/27/2020 13:47 by Dr Tyrell Knox, .        Last  colonoscopy 3/2016 by Dr. Eaton at [facility] noting a polyp ( path adenomatous).   No family history colon cancer or polyps.      Past Medical History:   Diagnosis Date   • Acid reflux    • Allergic rhinitis    • Chronic mucoid otitis media    • Chronic rhinitis    • Chronic sinusitis    • Coronary artery disease     HEART BYPASS 2004   • Eustachian tube dysfunction    • Heart disease    • Hypertension    • Mixed hearing loss of left ear    • Perianal abscess    • Sensorineural hearing loss    • Tinnitus        Past Surgical History:   Procedure Laterality Date   • CORONARY ARTERY BYPASS GRAFT     • INCISION AND DRAINAGE PERIRECTAL ABSCESS N/A 3/3/2017    Procedure: INCISION AND DRAINAGE OF JEET ANAL ABSCESS;  Surgeon: Lynette Smith MD;  Location: [facility];  Service:    • MYRINGOTOMY W/ TUBES Left 04/17/2017    06/10/2016   • TONSILLECTOMY     • TOTAL HIP ARTHROPLASTY         Outpatient Medications Marked as Taking for the 6/4/20 encounter (Office Visit) with Zuleika Schulte APRN   Medication Sig Dispense Refill   • albuterol (PROVENTIL HFA;VENTOLIN HFA) 108 (90 BASE) MCG/ACT inhaler Every 6 (Six) Hours.     • amLODIPine (NORVASC) 10 MG tablet Take 10 mg by mouth daily     • aspirin (ASPIR) 81 MG EC tablet Take 81 mg by mouth Daily.     • aspirin-acetaminophen-caffeine (EXCEDRIN MIGRAINE) 250-250-65 MG per tablet Take 1 tablet by mouth Every 6 (Six) Hours As Needed for Headache.     • azelastine (ASTELIN) 0.1 % nasal spray 2 sprays into each nostril 2 (Two) Times a Day. Use in each nostril as directed     • CloNIDine (CATAPRES) 0.2 MG tablet Take 0.2 mg by mouth 3 times daily     • desoximetasone (TOPICORT) 0.25 % cream APPLY EXTERNALLY TO THE AFFECTED AREA TWICE DAILY AS DIRECTED 60 g 0   • diphenoxylate-atropine (LOMOTIL) 2.5-0.025 MG per tablet Take 1 tablet by mouth 4 (Four) Times a Day As Needed for Diarrhea. 30 tablet 0   • fexofenadine (ALLEGRA) 180 MG tablet Take 180 mg by mouth Daily.     • fluticasone  (FLONASE) 50 MCG/ACT nasal spray USE 1 SPRAY IN EACH NOSTRIL TWICE A DAY 48 g 5   • magnesium chloride ER (MAG-DELAY) 535 (64 MG) MG CR tablet Take 64 mg by mouth daily     • metoprolol succinate XL (TOPROL-XL) 25 MG 24 hr tablet Take 1 tablet by mouth Daily. 30 tablet 5   • Multiple Vitamins-Minerals (PRESERVISION/LUTEIN PO) Take  by mouth.     • Omeprazole Magnesium 20.6 (20 BASE) MG capsule delayed-release Take by mouth daily     • sildenafil (REVATIO) 20 MG tablet TAKE 2 3 TABLETS BY MOUTH 30 MINS PRIOR TO INTERCOURSE     • valsartan (DIOVAN) 80 MG tablet Take 160 mg by mouth Daily.         Allergies   Allergen Reactions   • Lortab [Hydrocodone-Acetaminophen] Other (See Comments) and Hallucinations     CLOSTROPHOBIC       Social History     Socioeconomic History   • Marital status:      Spouse name: Not on file   • Number of children: Not on file   • Years of education: Not on file   • Highest education level: Not on file   Tobacco Use   • Smoking status: Former Smoker   • Smokeless tobacco: Never Used   • Tobacco comment: quit 2010   Substance and Sexual Activity   • Alcohol use: Yes     Comment: 2 beers daily   • Drug use: No   • Sexual activity: Defer       Family History   Problem Relation Age of Onset   • No Known Problems Mother    • No Known Problems Father    • Colon cancer Neg Hx    • Colon polyps Neg Hx        Review of Systems   Constitutional: Negative for appetite change, chills, fatigue, fever and unexpected weight change.   HENT: Negative for sore throat and trouble swallowing.    Eyes: Negative for visual disturbance.   Respiratory: Negative for shortness of breath and wheezing.    Cardiovascular: Negative for chest pain and palpitations.   Gastrointestinal: Positive for diarrhea. Negative for abdominal distention, abdominal pain, anal bleeding, blood in stool, constipation, nausea and vomiting.        As mentioned in hpi   Genitourinary: Negative for difficulty urinating and hematuria.  "  Musculoskeletal: Negative for arthralgias and back pain.   Skin: Negative for color change and rash.   Neurological: Negative for dizziness, seizures, syncope, light-headedness and headaches.   Hematological: Negative for adenopathy.   Psychiatric/Behavioral: Negative for confusion. The patient is not nervous/anxious.         Vitals:    06/04/20 0916   BP: 146/62   Pulse: 74   Temp: 98.7 °F (37.1 °C)   SpO2: 99%   Weight: 77.6 kg (171 lb)   Height: 182.9 cm (72\")      Body mass index is 23.19 kg/m².    Physical Exam   Constitutional: He appears well-developed and well-nourished. No distress.   HENT:   Head: Normocephalic and atraumatic.   Eyes: EOM are normal. No scleral icterus.   Neck: Neck supple. No JVD present.   Cardiovascular: Normal rate, regular rhythm and normal heart sounds.   Pulmonary/Chest: Effort normal and breath sounds normal.   Abdominal: Soft. Bowel sounds are normal. He exhibits no distension. There is no tenderness.   Musculoskeletal: Normal range of motion. He exhibits no deformity.   Neurological: He is alert.   Skin: Skin is warm and dry. No rash noted.   Psychiatric: He has a normal mood and affect. His behavior is normal.   Vitals reviewed.      Results for orders placed or performed during the hospital encounter of 05/27/20   POC Creatinine   Result Value Ref Range    Creatinine 1.00 0.60 - 1.30 mg/dL           ASSESSMENT AND PLAN    Assessment/Plan     Ricardo was seen today for diarrhea.    Diagnoses and all orders for this visit:    Diarrhea, unspecified type  -     Gastrointestinal Panel, PCR - Stool, Per Rectum; Future  -     Clostridium Difficile Toxin - Stool, Per Rectum  -     TSH    History of adenomatous polyp of colon         Diarrhea of unclear etiology.  I would recommend stool studies and labs at this point.  I will contact patient with results of labs.  I have asked the patient to contact our office if he has not heard from us regarding the results in at least the next 3 " days.  I would recommend that he take a probiotic daily.  We discussed pursuing possible colonoscopy in the near future depending on results of stool studies.  Patient verbalized understanding.  Would recommend ER if has worsening symptoms.        He did have a CT scan of the abdomen and pelvis that showed some small bowel containing right inguinal hernia.  On the CT there was no evidence of current high-grade obstruction, the oral contrast extended down to the level of his inguinal hernia sac but did not extend beyond his hernia during the exam.  The patient has no complaints of abdominal pain or distention.  I would recommend ER if has symptoms.  His PCP did recommend seeing a surgeon however the patient declined.  He states has had the hernia for several years and is not having symptoms so he does not wish to see a surgeon.           Body mass index is 23.19 kg/m².    Patient's Body mass index is 23.19 kg/m². BMI is within normal parameters. No follow-up required..           STERLING Knutson    EMR Dragon/transcription disclaimer:  Much of this encounter note is electronic transcription/translation of spoken language to printed text.  The electronic translation of spoken language may be erroneous, or at times, nonsensical words or phrases may be inadvertently transcribed.  Although I have reviewed the note for such errors, some may still exist.

## 2020-06-08 ENCOUNTER — TELEPHONE (OUTPATIENT)
Dept: GASTROENTEROLOGY | Facility: CLINIC | Age: 70
End: 2020-06-08

## 2020-06-08 DIAGNOSIS — A09 DIARRHEA OF INFECTIOUS ORIGIN: ICD-10-CM

## 2020-06-08 NOTE — TELEPHONE ENCOUNTER
Let him know stool studies are all negative. I am going to discuss with dr griffin repeating colonoscopy before actually scheduling. Will contact him once discussed with dr griffin. Will probably be tomorrow when I contact the patient.

## 2020-06-09 RX ORDER — DIPHENOXYLATE HYDROCHLORIDE AND ATROPINE SULFATE 2.5; .025 MG/1; MG/1
TABLET ORAL
Qty: 30 TABLET | Refills: 5 | Status: ON HOLD | OUTPATIENT
Start: 2020-06-09 | End: 2020-07-12

## 2020-06-10 NOTE — TELEPHONE ENCOUNTER
PT is scheduled  for 7-2-20    Prep and pre procedure covid requirements and testing explained verbally in detail and also a copy of instructions given to patient.

## 2020-06-11 ENCOUNTER — PREP FOR SURGERY (OUTPATIENT)
Dept: OTHER | Facility: HOSPITAL | Age: 70
End: 2020-06-11

## 2020-06-11 DIAGNOSIS — Z86.010 HISTORY OF ADENOMATOUS POLYP OF COLON: ICD-10-CM

## 2020-06-11 DIAGNOSIS — R19.7 DIARRHEA, UNSPECIFIED TYPE: Primary | ICD-10-CM

## 2020-06-17 ENCOUNTER — OFFICE VISIT (OUTPATIENT)
Dept: INTERNAL MEDICINE | Facility: CLINIC | Age: 70
End: 2020-06-17

## 2020-06-17 VITALS
HEIGHT: 72 IN | HEART RATE: 68 BPM | OXYGEN SATURATION: 100 % | DIASTOLIC BLOOD PRESSURE: 64 MMHG | RESPIRATION RATE: 18 BRPM | TEMPERATURE: 97.5 F | WEIGHT: 178.5 LBS | BODY MASS INDEX: 24.18 KG/M2 | SYSTOLIC BLOOD PRESSURE: 124 MMHG

## 2020-06-17 DIAGNOSIS — R19.7 DIARRHEA, UNSPECIFIED TYPE: ICD-10-CM

## 2020-06-17 DIAGNOSIS — I10 ACCELERATED HYPERTENSION: Primary | ICD-10-CM

## 2020-06-17 DIAGNOSIS — K55.9 ISCHEMIC COLITIS (HCC): ICD-10-CM

## 2020-06-17 PROCEDURE — 99213 OFFICE O/P EST LOW 20 MIN: CPT | Performed by: INTERNAL MEDICINE

## 2020-06-17 RX ORDER — POLYETHYLENE GLYCOL-3350 AND ELECTROLYTES 236; 6.74; 5.86; 2.97; 22.74 G/274.31G; G/274.31G; G/274.31G; G/274.31G; G/274.31G
POWDER, FOR SOLUTION ORAL
Status: ON HOLD | COMMUNITY
Start: 2020-06-11 | End: 2020-07-02

## 2020-06-17 NOTE — PROGRESS NOTES
Subjective   Ricardo Hugo is a 70 y.o. male.   Chief Complaint   Patient presents with   • Hypertension     6 month follow-up.       This is a follow-up visit for Mr. Hugo who has been having difficulty with multiple episodes of diarrhea it is now controlled with Lomotil taking about 2 to 3 tablets daily.       The following portions of the patient's history were reviewed and updated as appropriate: allergies, current medications, past family history, past medical history, past social history, past surgical history and problem list.    Review of Systems   Constitutional: Negative for activity change, appetite change, fatigue, fever, unexpected weight gain and unexpected weight loss.   HENT: Negative for swollen glands, trouble swallowing and voice change.    Eyes: Negative for blurred vision and visual disturbance.   Respiratory: Negative for cough and shortness of breath.    Cardiovascular: Negative for chest pain, palpitations and leg swelling.   Gastrointestinal: Positive for diarrhea. Negative for abdominal pain, constipation, nausea, vomiting and indigestion.   Endocrine: Negative for cold intolerance, heat intolerance, polydipsia and polyphagia.   Genitourinary: Negative for dysuria and frequency.   Musculoskeletal: Negative for arthralgias, back pain, joint swelling and neck pain.   Skin: Negative for color change, rash and skin lesions.   Neurological: Negative for dizziness, weakness, headache, memory problem and confusion.   Hematological: Does not bruise/bleed easily.   Psychiatric/Behavioral: Negative for agitation, hallucinations and suicidal ideas. The patient is not nervous/anxious.        Objective   Past Medical History:   Diagnosis Date   • Acid reflux    • Allergic rhinitis    • Chronic mucoid otitis media    • Chronic rhinitis    • Chronic sinusitis    • Coronary artery disease     HEART BYPASS 2004   • Eustachian tube dysfunction    • Heart disease    • Hypertension    • Mixed hearing  loss of left ear    • Perianal abscess    • Sensorineural hearing loss    • Tinnitus       Past Surgical History:   Procedure Laterality Date   • CORONARY ARTERY BYPASS GRAFT     • INCISION AND DRAINAGE PERIRECTAL ABSCESS N/A 3/3/2017    Procedure: INCISION AND DRAINAGE OF JEET ANAL ABSCESS;  Surgeon: Lynette Smith MD;  Location: [facility] OR;  Service:    • MYRINGOTOMY W/ TUBES Left 04/17/2017    06/10/2016   • TONSILLECTOMY     • TOTAL HIP ARTHROPLASTY          Current Outpatient Medications:   •  albuterol (PROVENTIL HFA;VENTOLIN HFA) 108 (90 BASE) MCG/ACT inhaler, Every 6 (Six) Hours., Disp: , Rfl:   •  amLODIPine (NORVASC) 10 MG tablet, Take 10 mg by mouth daily, Disp: , Rfl:   •  aspirin (ASPIR) 81 MG EC tablet, Take 81 mg by mouth Daily., Disp: , Rfl:   •  aspirin-acetaminophen-caffeine (EXCEDRIN MIGRAINE) 250-250-65 MG per tablet, Take 1 tablet by mouth Every 6 (Six) Hours As Needed for Headache., Disp: , Rfl:   •  azelastine (ASTELIN) 0.1 % nasal spray, 2 sprays into each nostril 2 (Two) Times a Day. Use in each nostril as directed, Disp: , Rfl:   •  CloNIDine (CATAPRES) 0.2 MG tablet, Take 0.2 mg by mouth 3 times daily, Disp: , Rfl:   •  desoximetasone (TOPICORT) 0.25 % cream, APPLY EXTERNALLY TO THE AFFECTED AREA TWICE DAILY AS DIRECTED, Disp: 60 g, Rfl: 0  •  diphenoxylate-atropine (LOMOTIL) 2.5-0.025 MG per tablet, TAKE 1 TABLET BY MOUTH FOUR TIMES DAILY AS NEEDED FOR DIARRHEA, Disp: 30 tablet, Rfl: 5  •  fexofenadine (ALLEGRA) 180 MG tablet, Take 180 mg by mouth Daily., Disp: , Rfl:   •  fluticasone (FLONASE) 50 MCG/ACT nasal spray, USE 1 SPRAY IN EACH NOSTRIL TWICE A DAY, Disp: 48 g, Rfl: 5  •  GAVILYTE-G 236 g solution, TK AS DIRECTED, Disp: , Rfl:   •  magnesium chloride ER (MAG-DELAY) 535 (64 MG) MG CR tablet, Take 64 mg by mouth daily, Disp: , Rfl:   •  metoprolol succinate XL (TOPROL-XL) 25 MG 24 hr tablet, Take 1 tablet by mouth Daily., Disp: 30 tablet, Rfl: 5  •  Multiple Vitamins-Minerals  (PRESERVISION/LUTEIN PO), Take  by mouth., Disp: , Rfl:   •  Omeprazole Magnesium 20.6 (20 BASE) MG capsule delayed-release, Take by mouth daily, Disp: , Rfl:   •  sildenafil (REVATIO) 20 MG tablet, TAKE 2 3 TABLETS BY MOUTH 30 MINS PRIOR TO INTERCOURSE, Disp: , Rfl:   •  valsartan (DIOVAN) 80 MG tablet, Take 160 mg by mouth Daily., Disp: , Rfl:      Vitals:    06/17/20 0937   BP: 124/64   Pulse: 68   Resp: 18   Temp: 97.5 °F (36.4 °C)   SpO2: 100%         06/17/20 0937   Weight: 81 kg (178 lb 8 oz)     Patient's Body mass index is 24.21 kg/m². BMI is within normal parameters. No follow-up required..      Physical Exam   Constitutional: He is oriented to person, place, and time. He appears well-developed and well-nourished.   HENT:   Head: Normocephalic and atraumatic.   Right Ear: External ear normal.   Left Ear: External ear normal.   Nose: Nose normal.   Mouth/Throat: Oropharynx is clear and moist.   Eyes: Pupils are equal, round, and reactive to light. Conjunctivae and EOM are normal.   Neck: Normal range of motion. Neck supple. No thyromegaly present.   Cardiovascular: Normal rate, regular rhythm, normal heart sounds and intact distal pulses.   Pulmonary/Chest: Effort normal and breath sounds normal.   Abdominal: Soft. Bowel sounds are normal.   Lymphadenopathy:     He has no cervical adenopathy.   Neurological: He is alert and oriented to person, place, and time.   Skin: Skin is warm and dry.   Psychiatric: He has a normal mood and affect. His behavior is normal. Thought content normal.   Nursing note and vitals reviewed.            Assessment/Plan   Diagnoses and all orders for this visit:    1. Accelerated hypertension (Primary)    2. Diarrhea, unspecified type    3. Ischemic colitis (CMS/HCC)        At this point patient's blood pressure is under control his diarrhea is controlled with use of Lomotil he is scheduled for colonoscopy to further assess etiology of his diarrhea which I suspect to be ischemic  colitis though that has not been diagnosed at this point.

## 2020-06-19 RX ORDER — VALSARTAN 160 MG/1
TABLET ORAL
Qty: 90 TABLET | Refills: 3 | Status: ON HOLD | OUTPATIENT
Start: 2020-06-19 | End: 2020-07-12

## 2020-06-23 ENCOUNTER — TRANSCRIBE ORDERS (OUTPATIENT)
Dept: ADMINISTRATIVE | Facility: HOSPITAL | Age: 70
End: 2020-06-23

## 2020-06-23 DIAGNOSIS — Z01.818 PRE-OP TESTING: Primary | ICD-10-CM

## 2020-06-29 ENCOUNTER — LAB (OUTPATIENT)
Dept: LAB | Facility: HOSPITAL | Age: 70
End: 2020-06-29

## 2020-06-29 PROCEDURE — C9803 HOPD COVID-19 SPEC COLLECT: HCPCS | Performed by: INTERNAL MEDICINE

## 2020-06-29 PROCEDURE — U0003 INFECTIOUS AGENT DETECTION BY NUCLEIC ACID (DNA OR RNA); SEVERE ACUTE RESPIRATORY SYNDROME CORONAVIRUS 2 (SARS-COV-2) (CORONAVIRUS DISEASE [COVID-19]), AMPLIFIED PROBE TECHNIQUE, MAKING USE OF HIGH THROUGHPUT TECHNOLOGIES AS DESCRIBED BY CMS-2020-01-R: HCPCS | Performed by: INTERNAL MEDICINE

## 2020-06-30 ENCOUNTER — OFFICE VISIT (OUTPATIENT)
Dept: INTERNAL MEDICINE | Facility: CLINIC | Age: 70
End: 2020-06-30

## 2020-06-30 DIAGNOSIS — K55.9 ISCHEMIC COLITIS (HCC): ICD-10-CM

## 2020-06-30 DIAGNOSIS — R19.7 DIARRHEA, UNSPECIFIED TYPE: Primary | ICD-10-CM

## 2020-06-30 DIAGNOSIS — R53.1 WEAKNESS: ICD-10-CM

## 2020-06-30 DIAGNOSIS — R63.0 DECREASED APPETITE: ICD-10-CM

## 2020-06-30 LAB
COVID LABCORP PRIORITY: NORMAL
SARS-COV-2 RNA RESP QL NAA+PROBE: NOT DETECTED

## 2020-06-30 PROCEDURE — 99442 PR PHYS/QHP TELEPHONE EVALUATION 11-20 MIN: CPT | Performed by: NURSE PRACTITIONER

## 2020-06-30 NOTE — PROGRESS NOTES
Subjective   Ricardo Hguo is a 70 y.o. male.   Chief Complaint   Patient presents with   • Diarrhea   • Fatigue       You have chosen to receive care through a telephone visit. Do you consent to use a telephone visit for your medical care today? Yes    Patients is a 69 yo male who present via telephone visit to discuss intermittent fever and persistent diarrhea since February. He is having 4-5 stools per day, but reports this has improved since 8-10 stools daily in february. Stools are all loose and watery. No solid stool during this time. He had taken omnicef and flagyl 4-27-20 prescribed by his pcp and did not finish and his diarrhea did not improve. He was taking lomotil and noticed some improvement, but stopped 4 days ago.  Prior to the diarrhea starting he had one solid stool per day.  No rectal bleeding.No abdominal pain. No wt loss.  No recent travel. He reports 101.4 temperature yesterday morning, and took one dose of tylenol and increased fluid intake to 100 ounces/day with resolution of temperature. Today his temperature was 97.2 at 3 pm. He is scheduled for colonoscopy in 2 days and was COVID tested yesterday, no results at the time of this note. Patient reports weakness worsening related to decreased appetite. Last meal was dinner Monday.     Diarrhea    This is a chronic problem. The current episode started more than 1 month ago. The problem occurs 2 to 4 times per day. The problem has been gradually improving. The stool consistency is described as watery. The patient states that diarrhea does not awaken him from sleep. Associated symptoms include a fever. Pertinent negatives include no abdominal pain, arthralgias, bloating, chills, coughing, increased  flatus, myalgias, sweats, vomiting or weight loss. Associated symptoms comments: Had a fever 101.4 yesterday morning; 1 dose of tylenol; subsequent temperatures 99.4, 98.7; and today 97.2. Exacerbated by: have not identified; tested for possible  infectious origins and treated in the past with minimal improvement. There are no known risk factors. He has tried anti-motility drug and increased fluids for the symptoms. The treatment provided moderate relief.   Fatigue   This is a recurrent problem. The current episode started more than 1 month ago. The problem occurs intermittently. The problem has been waxing and waning. Associated symptoms include fatigue and a fever. Pertinent negatives include no abdominal pain, arthralgias, chest pain, chills, coughing, joint swelling, myalgias, nausea, neck pain, rash, swollen glands, vomiting or weakness. Exacerbated by: lack of appetite related to chronic diarrhea since february. He has tried rest and eating (trying to slowly increase diet) for the symptoms. The treatment provided mild relief.        The following portions of the patient's history were reviewed and updated as appropriate: allergies, current medications, past family history, past medical history, past social history, past surgical history and problem list.    Review of Systems   Constitutional: Positive for fatigue and fever. Negative for activity change, appetite change, chills, unexpected weight gain and unexpected weight loss.   HENT: Negative for swollen glands, trouble swallowing and voice change.    Eyes: Negative for blurred vision and visual disturbance.   Respiratory: Negative for cough and shortness of breath.    Cardiovascular: Negative for chest pain, palpitations and leg swelling.   Gastrointestinal: Positive for diarrhea. Negative for abdominal pain, bloating, constipation, flatus, nausea, vomiting and indigestion.   Endocrine: Negative for cold intolerance, heat intolerance, polydipsia and polyphagia.   Genitourinary: Negative for dysuria and frequency.   Musculoskeletal: Negative for arthralgias, back pain, joint swelling, myalgias and neck pain.   Skin: Negative for color change, rash and skin lesions.   Neurological: Negative for  dizziness, weakness, headache, memory problem and confusion.   Hematological: Does not bruise/bleed easily.   Psychiatric/Behavioral: Negative for agitation, hallucinations and suicidal ideas. The patient is not nervous/anxious.        Objective   Past Medical History:   Diagnosis Date   • Acid reflux    • Allergic rhinitis    • Chronic mucoid otitis media    • Chronic rhinitis    • Chronic sinusitis    • Coronary artery disease     HEART BYPASS 2004   • Eustachian tube dysfunction    • Heart disease    • Hypertension    • Mixed hearing loss of left ear    • Perianal abscess    • Sensorineural hearing loss    • Tinnitus       Past Surgical History:   Procedure Laterality Date   • CORONARY ARTERY BYPASS GRAFT     • INCISION AND DRAINAGE PERIRECTAL ABSCESS N/A 3/3/2017    Procedure: INCISION AND DRAINAGE OF JEET ANAL ABSCESS;  Surgeon: Lynette Smith MD;  Location: [facility] OR;  Service:    • MYRINGOTOMY W/ TUBES Left 04/17/2017    06/10/2016   • TONSILLECTOMY     • TOTAL HIP ARTHROPLASTY          Current Outpatient Medications:   •  albuterol (PROVENTIL HFA;VENTOLIN HFA) 108 (90 BASE) MCG/ACT inhaler, Every 6 (Six) Hours., Disp: , Rfl:   •  amLODIPine (NORVASC) 10 MG tablet, Take 10 mg by mouth daily, Disp: , Rfl:   •  aspirin (ASPIR) 81 MG EC tablet, Take 81 mg by mouth Daily., Disp: , Rfl:   •  aspirin-acetaminophen-caffeine (EXCEDRIN MIGRAINE) 250-250-65 MG per tablet, Take 1 tablet by mouth Every 6 (Six) Hours As Needed for Headache., Disp: , Rfl:   •  azelastine (ASTELIN) 0.1 % nasal spray, 2 sprays into each nostril 2 (Two) Times a Day. Use in each nostril as directed, Disp: , Rfl:   •  CloNIDine (CATAPRES) 0.2 MG tablet, Take 0.2 mg by mouth 3 times daily, Disp: , Rfl:   •  desoximetasone (TOPICORT) 0.25 % cream, APPLY EXTERNALLY TO THE AFFECTED AREA TWICE DAILY AS DIRECTED, Disp: 60 g, Rfl: 0  •  diphenoxylate-atropine (LOMOTIL) 2.5-0.025 MG per tablet, TAKE 1 TABLET BY MOUTH FOUR TIMES DAILY AS NEEDED FOR  DIARRHEA, Disp: 30 tablet, Rfl: 5  •  fexofenadine (ALLEGRA) 180 MG tablet, Take 180 mg by mouth Daily., Disp: , Rfl:   •  fluticasone (FLONASE) 50 MCG/ACT nasal spray, USE 1 SPRAY IN EACH NOSTRIL TWICE A DAY, Disp: 48 g, Rfl: 5  •  GAVILYTE-G 236 g solution, TK AS DIRECTED, Disp: , Rfl:   •  magnesium chloride ER (MAG-DELAY) 535 (64 MG) MG CR tablet, Take 64 mg by mouth daily, Disp: , Rfl:   •  metoprolol succinate XL (TOPROL-XL) 25 MG 24 hr tablet, Take 1 tablet by mouth Daily., Disp: 30 tablet, Rfl: 5  •  Multiple Vitamins-Minerals (PRESERVISION/LUTEIN PO), Take  by mouth., Disp: , Rfl:   •  Omeprazole Magnesium 20.6 (20 BASE) MG capsule delayed-release, Take by mouth daily, Disp: , Rfl:   •  sildenafil (REVATIO) 20 MG tablet, TAKE 2 3 TABLETS BY MOUTH 30 MINS PRIOR TO INTERCOURSE, Disp: , Rfl:   •  valsartan (DIOVAN) 160 MG tablet, TAKE 1 TABLET DAILY, Disp: 90 tablet, Rfl: 3       Physical Exam (telephone, N/A)          Assessment/Plan   Diagnoses and all orders for this visit:    1. Diarrhea, unspecified type (Primary)    2. Ischemic colitis (CMS/HCC)    3. Weakness    4. Decreased appetite    This visit has been rescheduled as a phone visit to comply with patient safety concerns in accordance with CDC recommendations. Total time of discussion was 11 minutes.      Patient will continue to drink  oz of water daily. Encouraged him to drink and eat small amounts through out the day. Encouraged drinking protein drinks to increase overall caloric intake. Patient advised on the signs and symptoms of acute abdomen and acknowledges the importance of going to the ED for assessment if this occurs. Will proceed with colonoscopy July 2.

## 2020-07-02 ENCOUNTER — ANESTHESIA (OUTPATIENT)
Dept: GASTROENTEROLOGY | Facility: HOSPITAL | Age: 70
End: 2020-07-02

## 2020-07-02 ENCOUNTER — ANESTHESIA EVENT (OUTPATIENT)
Dept: GASTROENTEROLOGY | Facility: HOSPITAL | Age: 70
End: 2020-07-02

## 2020-07-02 ENCOUNTER — HOSPITAL ENCOUNTER (OUTPATIENT)
Facility: HOSPITAL | Age: 70
Setting detail: HOSPITAL OUTPATIENT SURGERY
Discharge: HOME | End: 2020-07-02
Attending: INTERNAL MEDICINE | Admitting: INTERNAL MEDICINE

## 2020-07-02 VITALS
RESPIRATION RATE: 23 BRPM | TEMPERATURE: 97.1 F | HEIGHT: 72 IN | WEIGHT: 164 LBS | BODY MASS INDEX: 22.21 KG/M2 | SYSTOLIC BLOOD PRESSURE: 110 MMHG | OXYGEN SATURATION: 94 % | DIASTOLIC BLOOD PRESSURE: 53 MMHG | HEART RATE: 70 BPM

## 2020-07-02 DIAGNOSIS — K63.5 POLYP OF COLON: ICD-10-CM

## 2020-07-02 DIAGNOSIS — R19.7 DIARRHEA, UNSPECIFIED TYPE: ICD-10-CM

## 2020-07-02 LAB — CRP SERPL-MCNC: 23.95 MG/DL (ref 0–0.5)

## 2020-07-02 PROCEDURE — 86140 C-REACTIVE PROTEIN: CPT | Performed by: INTERNAL MEDICINE

## 2020-07-02 PROCEDURE — 25010000002 PROPOFOL 10 MG/ML EMULSION: Performed by: NURSE ANESTHETIST, CERTIFIED REGISTERED

## 2020-07-02 PROCEDURE — 25010000002 PHENYLEPHRINE HCL 0.8 MG/10ML SOLUTION PREFILLED SYRINGE: Performed by: NURSE ANESTHETIST, CERTIFIED REGISTERED

## 2020-07-02 PROCEDURE — 45385 COLONOSCOPY W/LESION REMOVAL: CPT | Performed by: INTERNAL MEDICINE

## 2020-07-02 PROCEDURE — 36415 COLL VENOUS BLD VENIPUNCTURE: CPT | Performed by: INTERNAL MEDICINE

## 2020-07-02 PROCEDURE — 45380 COLONOSCOPY AND BIOPSY: CPT | Performed by: INTERNAL MEDICINE

## 2020-07-02 PROCEDURE — 88305 TISSUE EXAM BY PATHOLOGIST: CPT | Performed by: INTERNAL MEDICINE

## 2020-07-02 RX ORDER — SODIUM CHLORIDE 0.9 % (FLUSH) 0.9 %
10 SYRINGE (ML) INJECTION AS NEEDED
Status: CANCELLED | OUTPATIENT
Start: 2020-07-02

## 2020-07-02 RX ORDER — LIDOCAINE HYDROCHLORIDE 10 MG/ML
0.5 INJECTION, SOLUTION EPIDURAL; INFILTRATION; INTRACAUDAL; PERINEURAL ONCE AS NEEDED
Status: DISCONTINUED | OUTPATIENT
Start: 2020-07-02 | End: 2020-07-02 | Stop reason: HOSPADM

## 2020-07-02 RX ORDER — PROPOFOL 10 MG/ML
VIAL (ML) INTRAVENOUS AS NEEDED
Status: DISCONTINUED | OUTPATIENT
Start: 2020-07-02 | End: 2020-07-02 | Stop reason: SURG

## 2020-07-02 RX ORDER — PHENYLEPHRINE HCL IN 0.9% NACL 0.8MG/10ML
SYRINGE (ML) INTRAVENOUS AS NEEDED
Status: DISCONTINUED | OUTPATIENT
Start: 2020-07-02 | End: 2020-07-02 | Stop reason: SURG

## 2020-07-02 RX ORDER — METHYLPREDNISOLONE 4 MG/1
TABLET ORAL
Qty: 21 EACH | Refills: 0 | Status: ON HOLD | OUTPATIENT
Start: 2020-07-02 | End: 2020-07-12

## 2020-07-02 RX ORDER — SODIUM CHLORIDE 9 MG/ML
500 INJECTION, SOLUTION INTRAVENOUS CONTINUOUS PRN
Status: DISCONTINUED | OUTPATIENT
Start: 2020-07-02 | End: 2020-07-02 | Stop reason: HOSPADM

## 2020-07-02 RX ORDER — ONDANSETRON 2 MG/ML
4 INJECTION INTRAMUSCULAR; INTRAVENOUS ONCE AS NEEDED
Status: DISCONTINUED | OUTPATIENT
Start: 2020-07-02 | End: 2020-07-02 | Stop reason: HOSPADM

## 2020-07-02 RX ORDER — SODIUM CHLORIDE 0.9 % (FLUSH) 0.9 %
10 SYRINGE (ML) INJECTION AS NEEDED
Status: DISCONTINUED | OUTPATIENT
Start: 2020-07-02 | End: 2020-07-02 | Stop reason: HOSPADM

## 2020-07-02 RX ORDER — SODIUM CHLORIDE 9 MG/ML
100 INJECTION, SOLUTION INTRAVENOUS CONTINUOUS
Status: CANCELLED | OUTPATIENT
Start: 2020-07-02

## 2020-07-02 RX ORDER — SODIUM CHLORIDE 0.9 % (FLUSH) 0.9 %
10 SYRINGE (ML) INJECTION EVERY 12 HOURS SCHEDULED
Status: CANCELLED | OUTPATIENT
Start: 2020-07-02

## 2020-07-02 RX ADMIN — PROPOFOL 300 MG: 10 INJECTION, EMULSION INTRAVENOUS at 08:00

## 2020-07-02 RX ADMIN — SODIUM CHLORIDE 500 ML: 9 INJECTION, SOLUTION INTRAVENOUS at 07:27

## 2020-07-02 RX ADMIN — Medication 80 MCG: at 08:28

## 2020-07-02 RX ADMIN — Medication 80 MCG: at 08:19

## 2020-07-02 RX ADMIN — LIDOCAINE HYDROCHLORIDE 100 MG: 20 INJECTION, SOLUTION INTRAVENOUS at 08:00

## 2020-07-02 NOTE — ANESTHESIA POSTPROCEDURE EVALUATION
"Patient: Ricardo Hugo    Procedure Summary     Date:  07/02/20 Room / Location:  [facility] ENDOSCOPY 5 / [facility] ENDOSCOPY    Anesthesia Start:  0756 Anesthesia Stop:  0834    Procedure:  COLONOSCOPY WITH ANESTHESIA (N/A ) Diagnosis:       Diarrhea, unspecified type      (Diarrhea, unspecified type [R19.7])    Surgeon:  Adrien Brewster MD Provider:  Tahira Amato CRNA    Anesthesia Type:  MAC ASA Status:  3          Anesthesia Type: MAC    Vitals  Vitals Value Taken Time   /53 7/2/2020  8:50 AM   Temp     Pulse 69 7/2/2020  8:53 AM   Resp 23 7/2/2020  8:50 AM   SpO2 95 % 7/2/2020  8:53 AM   Vitals shown include unvalidated device data.        Post Anesthesia Care and Evaluation    Patient location during evaluation: PHASE II  Patient participation: complete - patient participated  Level of consciousness: awake and awake and alert  Pain score: 0  Pain management: adequate  Airway patency: patent  Anesthetic complications: No anesthetic complications  PONV Status: none  Cardiovascular status: acceptable  Respiratory status: acceptable  Hydration status: acceptable    Comments: Patient discharged according to acceptable Иван score per RN assessment. See nursing records for further information.     Blood pressure 110/53, pulse 70, temperature 97.1 °F (36.2 °C), temperature source Temporal, resp. rate 23, height 182.9 cm (72\"), weight 74.4 kg (164 lb), SpO2 94 %.        "

## 2020-07-02 NOTE — ANESTHESIA PREPROCEDURE EVALUATION
Anesthesia Evaluation     no history of anesthetic complications:  NPO Solid Status: > 8 hours  NPO Liquid Status: > 2 hours           Airway   Mallampati: III  TM distance: >3 FB  Neck ROM: full  Dental    (+) edentulous    Pulmonary - normal exam    breath sounds clear to auscultation  (-) asthma, recent URI, sleep apnea, not a smoker  Cardiovascular - normal exam  Exercise tolerance: good (4-7 METS)    Rhythm: regular  Rate: normal    (+) hypertension, CAD, CABG (2004),   (-) pacemaker, past MI, angina, cardiac stents      Neuro/Psych  (+) psychiatric history Anxiety,     (-) seizures, TIA, CVA  GI/Hepatic/Renal/Endo    (+)  GERD,    (-) liver disease, no renal disease, diabetes, no thyroid disorder    Musculoskeletal     Abdominal    Substance History      OB/GYN          Other                        Anesthesia Plan    ASA 3     MAC     intravenous induction     Anesthetic plan, all risks, benefits, and alternatives have been provided, discussed and informed consent has been obtained with: patient.

## 2020-07-02 NOTE — DISCHARGE INSTRUCTIONS
LABS DRAWN TODAY, CRP AND PROMETHIUS FIRST STEP    START MEDROL DOSE  JYOTSNA  BEGIN DAILY FIBER SUPPLEMEMT SUCH AS CITRUCEL 8 TO 10 GRAMS DAILY    HOLD MAGNESUIUM SUPPLEMENT UNTIL DIARRHEA IS IMPROVED

## 2020-07-06 ENCOUNTER — CLINICAL SUPPORT (OUTPATIENT)
Dept: INTERNAL MEDICINE | Facility: CLINIC | Age: 70
End: 2020-07-06

## 2020-07-06 ENCOUNTER — TELEPHONE (OUTPATIENT)
Dept: GASTROENTEROLOGY | Facility: CLINIC | Age: 70
End: 2020-07-06

## 2020-07-06 DIAGNOSIS — D51.0 PERNICIOUS ANEMIA: Primary | ICD-10-CM

## 2020-07-06 LAB
CYTO UR: NORMAL
LAB AP CASE REPORT: NORMAL
PATH REPORT.FINAL DX SPEC: NORMAL
PATH REPORT.GROSS SPEC: NORMAL

## 2020-07-06 PROCEDURE — 96372 THER/PROPH/DIAG INJ SC/IM: CPT | Performed by: INTERNAL MEDICINE

## 2020-07-06 RX ORDER — CYANOCOBALAMIN 1000 UG/ML
1000 INJECTION, SOLUTION INTRAMUSCULAR; SUBCUTANEOUS
Status: DISCONTINUED | OUTPATIENT
Start: 2020-07-06 | End: 2020-09-22 | Stop reason: HOSPADM

## 2020-07-06 RX ADMIN — CYANOCOBALAMIN 1000 MCG: 1000 INJECTION, SOLUTION INTRAMUSCULAR; SUBCUTANEOUS at 15:52

## 2020-07-06 NOTE — TELEPHONE ENCOUNTER
I appreciate the complement.  But he does need to get a hold of his PCP as t those symptoms sound like he is having urinary tract troubles which is not my area of expertise.    Also, let him know that the colon polyps were benign.  The biopsies of the colon did show minimal inflammation.  Hopefully this as we discussed is resolving and that the steroid pack I gave him will improve this.  We can reevaluate when he comes to the office.    Depending on his clinical outcome, we may need to repeat the colonoscopy at sometime in the future to assure that the inflammation completely subsides.  We can also reevaluate this depending his clinical progress with his office visit.  Tentatively now I have him to schedule a repeat colonoscopy no later than 1 year.

## 2020-07-06 NOTE — TELEPHONE ENCOUNTER
PT called and stated that when he needs to urinate he has to force it to start and once it does start he is in pain.No blood.  I ask how long this had been going on.  PT stated he had the issue before his procedure last Thursday.  I ask him if he had contacted his PCP.  He has been trying and will continue to reach them.    PT ask me to tell you that he was very impressed with your knowledge and bedside manner.

## 2020-07-09 ENCOUNTER — TELEPHONE (OUTPATIENT)
Dept: GASTROENTEROLOGY | Facility: CLINIC | Age: 70
End: 2020-07-09

## 2020-07-09 NOTE — TELEPHONE ENCOUNTER
I reviewed his [facility] test and they were suggestive that he has underlying Crohn's disease.  I see he has an appointment with [facility] July 30.  Can you switch that appointment with me either that day or sometime that week so we can reassess and evaluate.

## 2020-07-11 ENCOUNTER — APPOINTMENT (OUTPATIENT)
Dept: CT IMAGING | Facility: HOSPITAL | Age: 70
End: 2020-07-11

## 2020-07-11 ENCOUNTER — APPOINTMENT (OUTPATIENT)
Dept: ULTRASOUND IMAGING | Facility: HOSPITAL | Age: 70
End: 2020-07-11

## 2020-07-11 ENCOUNTER — HOSPITAL ENCOUNTER (INPATIENT)
Facility: HOSPITAL | Age: 70
LOS: 4 days | Discharge: HOME | End: 2020-07-15
Attending: EMERGENCY MEDICINE | Admitting: INTERNAL MEDICINE

## 2020-07-11 DIAGNOSIS — N17.9 AKI (ACUTE KIDNEY INJURY) (HCC): Primary | ICD-10-CM

## 2020-07-11 DIAGNOSIS — Z78.9 IMPAIRED MOBILITY AND ADLS: ICD-10-CM

## 2020-07-11 DIAGNOSIS — Z74.09 IMPAIRED MOBILITY: ICD-10-CM

## 2020-07-11 DIAGNOSIS — Z74.09 IMPAIRED MOBILITY AND ADLS: ICD-10-CM

## 2020-07-11 DIAGNOSIS — E87.1 HYPONATREMIA: ICD-10-CM

## 2020-07-11 DIAGNOSIS — N39.0 ACUTE UTI (URINARY TRACT INFECTION): ICD-10-CM

## 2020-07-11 DIAGNOSIS — R19.7 DIARRHEA, UNSPECIFIED TYPE: ICD-10-CM

## 2020-07-11 PROBLEM — M87.052 AVASCULAR NECROSIS OF FEMORAL HEAD, LEFT: Status: ACTIVE | Noted: 2020-07-11

## 2020-07-11 PROBLEM — R63.4 WEIGHT LOSS: Status: ACTIVE | Noted: 2020-07-11

## 2020-07-11 PROBLEM — N30.00 ACUTE CYSTITIS: Status: ACTIVE | Noted: 2020-07-11

## 2020-07-11 PROBLEM — E87.20 METABOLIC ACIDOSIS: Status: ACTIVE | Noted: 2020-07-11

## 2020-07-11 PROBLEM — R73.9 HYPERGLYCEMIA: Status: ACTIVE | Noted: 2020-07-11

## 2020-07-11 LAB
ALBUMIN SERPL-MCNC: 2.7 G/DL (ref 3.5–5.2)
ALBUMIN/GLOB SERPL: 0.7 G/DL
ALP SERPL-CCNC: 228 U/L (ref 39–117)
ALT SERPL W P-5'-P-CCNC: 48 U/L (ref 1–41)
ANION GAP SERPL CALCULATED.3IONS-SCNC: 16 MMOL/L (ref 5–15)
ARTERIAL PATENCY WRIST A: POSITIVE
AST SERPL-CCNC: 30 U/L (ref 1–40)
ATMOSPHERIC PRESS: 748 MMHG
BACTERIA UR QL AUTO: ABNORMAL /HPF
BASE EXCESS BLDA CALC-SCNC: -5.2 MMOL/L (ref 0–2)
BASOPHILS # BLD AUTO: 0.07 10*3/MM3 (ref 0–0.2)
BASOPHILS NFR BLD AUTO: 0.4 % (ref 0–1.5)
BDY SITE: ABNORMAL
BILIRUB SERPL-MCNC: 0.6 MG/DL (ref 0–1.2)
BILIRUB UR QL STRIP: NEGATIVE
BODY TEMPERATURE: 37 C
BUN SERPL-MCNC: 94 MG/DL (ref 8–23)
BUN/CREAT SERPL: 21.5 (ref 7–25)
CALCIUM SPEC-SCNC: 8.5 MG/DL (ref 8.6–10.5)
CHLORIDE SERPL-SCNC: 95 MMOL/L (ref 98–107)
CLARITY UR: ABNORMAL
CO2 SERPL-SCNC: 18 MMOL/L (ref 22–29)
COLOR UR: YELLOW
CREAT SERPL-MCNC: 4.38 MG/DL (ref 0.76–1.27)
CREAT UR-MCNC: 48.6 MG/DL
D-LACTATE SERPL-SCNC: 1.7 MMOL/L (ref 0.5–2)
DEPRECATED RDW RBC AUTO: 52.3 FL (ref 37–54)
EOSINOPHIL # BLD AUTO: 0.02 10*3/MM3 (ref 0–0.4)
EOSINOPHIL NFR BLD AUTO: 0.1 % (ref 0.3–6.2)
ERYTHROCYTE [DISTWIDTH] IN BLOOD BY AUTOMATED COUNT: 14.6 % (ref 12.3–15.4)
GFR SERPL CREATININE-BSD FRML MDRD: 13 ML/MIN/1.73
GFR SERPL CREATININE-BSD FRML MDRD: ABNORMAL ML/MIN/{1.73_M2}
GLOBULIN UR ELPH-MCNC: 3.9 GM/DL
GLUCOSE SERPL-MCNC: 179 MG/DL (ref 65–99)
GLUCOSE UR STRIP-MCNC: NEGATIVE MG/DL
HBA1C MFR BLD: 5.5 % (ref 4.8–5.6)
HCO3 BLDA-SCNC: 17.9 MMOL/L (ref 20–26)
HCT VFR BLD AUTO: 33.7 % (ref 37.5–51)
HGB BLD-MCNC: 11.1 G/DL (ref 13–17.7)
HGB UR QL STRIP.AUTO: ABNORMAL
IMM GRANULOCYTES # BLD AUTO: 0.53 10*3/MM3 (ref 0–0.05)
IMM GRANULOCYTES NFR BLD AUTO: 2.7 % (ref 0–0.5)
KETONES UR QL STRIP: NEGATIVE
LEUKOCYTE ESTERASE UR QL STRIP.AUTO: ABNORMAL
LYMPHOCYTES # BLD AUTO: 1.45 10*3/MM3 (ref 0.7–3.1)
LYMPHOCYTES NFR BLD AUTO: 7.4 % (ref 19.6–45.3)
Lab: ABNORMAL
MAGNESIUM SERPL-MCNC: 1.6 MG/DL (ref 1.6–2.4)
MCH RBC QN AUTO: 32.4 PG (ref 26.6–33)
MCHC RBC AUTO-ENTMCNC: 32.9 G/DL (ref 31.5–35.7)
MCV RBC AUTO: 98.3 FL (ref 79–97)
MODALITY: ABNORMAL
MONOCYTES # BLD AUTO: 0.91 10*3/MM3 (ref 0.1–0.9)
MONOCYTES NFR BLD AUTO: 4.7 % (ref 5–12)
NEUTROPHILS NFR BLD AUTO: 16.51 10*3/MM3 (ref 1.7–7)
NEUTROPHILS NFR BLD AUTO: 84.7 % (ref 42.7–76)
NITRITE UR QL STRIP: NEGATIVE
NRBC BLD AUTO-RTO: 0 /100 WBC (ref 0–0.2)
OSMOLALITY UR: 184 MOSM/KG (ref 601–850)
PCO2 BLDA: 26.9 MM HG (ref 35–45)
PCO2 TEMP ADJ BLD: 26.9 MM HG (ref 35–45)
PH BLDA: 7.43 PH UNITS (ref 7.35–7.45)
PH UR STRIP.AUTO: 5.5 [PH] (ref 5–8)
PH, TEMP CORRECTED: 7.43 PH UNITS (ref 7.35–7.45)
PHOSPHATE SERPL-MCNC: 5.4 MG/DL (ref 2.5–4.5)
PLATELET # BLD AUTO: 291 10*3/MM3 (ref 140–450)
PMV BLD AUTO: 10.5 FL (ref 6–12)
PO2 BLDA: 74.9 MM HG (ref 83–108)
PO2 TEMP ADJ BLD: 74.9 MM HG (ref 83–108)
POTASSIUM SERPL-SCNC: 4.2 MMOL/L (ref 3.5–5.2)
PROT SERPL-MCNC: 6.6 G/DL (ref 6–8.5)
PROT UR QL STRIP: ABNORMAL
PROT UR-MCNC: 15.4 MG/DL
RBC # BLD AUTO: 3.43 10*6/MM3 (ref 4.14–5.8)
RBC # UR: ABNORMAL /HPF
REF LAB TEST METHOD: ABNORMAL
SAO2 % BLDCOA: 96 % (ref 94–99)
SARS-COV-2 RDRP RESP QL NAA+PROBE: NOT DETECTED
SODIUM SERPL-SCNC: 129 MMOL/L (ref 136–145)
SODIUM UR-SCNC: 25 MMOL/L
SP GR UR STRIP: <=1.005 (ref 1–1.03)
SQUAMOUS #/AREA URNS HPF: ABNORMAL /HPF
T4 FREE SERPL-MCNC: 0.96 NG/DL (ref 0.93–1.7)
TSH SERPL DL<=0.05 MIU/L-ACNC: 4.78 UIU/ML (ref 0.27–4.2)
URATE SERPL-MCNC: 9.9 MG/DL (ref 3.4–7)
UROBILINOGEN UR QL STRIP: ABNORMAL
VENTILATOR MODE: ABNORMAL
WBC # BLD AUTO: 19.49 10*3/MM3 (ref 3.4–10.8)
WBC UR QL AUTO: ABNORMAL /HPF

## 2020-07-11 PROCEDURE — 80053 COMPREHEN METABOLIC PANEL: CPT | Performed by: EMERGENCY MEDICINE

## 2020-07-11 PROCEDURE — 83605 ASSAY OF LACTIC ACID: CPT | Performed by: EMERGENCY MEDICINE

## 2020-07-11 PROCEDURE — 85025 COMPLETE CBC W/AUTO DIFF WBC: CPT | Performed by: EMERGENCY MEDICINE

## 2020-07-11 PROCEDURE — 84156 ASSAY OF PROTEIN URINE: CPT | Performed by: NURSE PRACTITIONER

## 2020-07-11 PROCEDURE — 25010000002 CEFTRIAXONE PER 250 MG: Performed by: NURSE PRACTITIONER

## 2020-07-11 PROCEDURE — 93010 ELECTROCARDIOGRAM REPORT: CPT | Performed by: INTERNAL MEDICINE

## 2020-07-11 PROCEDURE — 93005 ELECTROCARDIOGRAM TRACING: CPT | Performed by: INTERNAL MEDICINE

## 2020-07-11 PROCEDURE — 36415 COLL VENOUS BLD VENIPUNCTURE: CPT

## 2020-07-11 PROCEDURE — 87086 URINE CULTURE/COLONY COUNT: CPT | Performed by: EMERGENCY MEDICINE

## 2020-07-11 PROCEDURE — 87205 SMEAR GRAM STAIN: CPT | Performed by: INTERNAL MEDICINE

## 2020-07-11 PROCEDURE — 84443 ASSAY THYROID STIM HORMONE: CPT | Performed by: NURSE PRACTITIONER

## 2020-07-11 PROCEDURE — 36600 WITHDRAWAL OF ARTERIAL BLOOD: CPT

## 2020-07-11 PROCEDURE — 82570 ASSAY OF URINE CREATININE: CPT | Performed by: NURSE PRACTITIONER

## 2020-07-11 PROCEDURE — 87635 SARS-COV-2 COVID-19 AMP PRB: CPT | Performed by: EMERGENCY MEDICINE

## 2020-07-11 PROCEDURE — 25010000002 ENOXAPARIN PER 10 MG: Performed by: NURSE PRACTITIONER

## 2020-07-11 PROCEDURE — 87040 BLOOD CULTURE FOR BACTERIA: CPT | Performed by: EMERGENCY MEDICINE

## 2020-07-11 PROCEDURE — 82570 ASSAY OF URINE CREATININE: CPT | Performed by: EMERGENCY MEDICINE

## 2020-07-11 PROCEDURE — 87077 CULTURE AEROBIC IDENTIFY: CPT | Performed by: EMERGENCY MEDICINE

## 2020-07-11 PROCEDURE — 83935 ASSAY OF URINE OSMOLALITY: CPT | Performed by: INTERNAL MEDICINE

## 2020-07-11 PROCEDURE — 81001 URINALYSIS AUTO W/SCOPE: CPT | Performed by: EMERGENCY MEDICINE

## 2020-07-11 PROCEDURE — 87186 SC STD MICRODIL/AGAR DIL: CPT | Performed by: EMERGENCY MEDICINE

## 2020-07-11 PROCEDURE — 99284 EMERGENCY DEPT VISIT MOD MDM: CPT

## 2020-07-11 PROCEDURE — 83036 HEMOGLOBIN GLYCOSYLATED A1C: CPT | Performed by: NURSE PRACTITIONER

## 2020-07-11 PROCEDURE — 74176 CT ABD & PELVIS W/O CONTRAST: CPT

## 2020-07-11 PROCEDURE — 84439 ASSAY OF FREE THYROXINE: CPT | Performed by: NURSE PRACTITIONER

## 2020-07-11 PROCEDURE — 87088 URINE BACTERIA CULTURE: CPT | Performed by: EMERGENCY MEDICINE

## 2020-07-11 PROCEDURE — 83735 ASSAY OF MAGNESIUM: CPT | Performed by: NURSE PRACTITIONER

## 2020-07-11 PROCEDURE — 76775 US EXAM ABDO BACK WALL LIM: CPT

## 2020-07-11 PROCEDURE — 84550 ASSAY OF BLOOD/URIC ACID: CPT | Performed by: NURSE PRACTITIONER

## 2020-07-11 PROCEDURE — 84540 ASSAY OF URINE/UREA-N: CPT | Performed by: NURSE PRACTITIONER

## 2020-07-11 PROCEDURE — 84100 ASSAY OF PHOSPHORUS: CPT | Performed by: NURSE PRACTITIONER

## 2020-07-11 PROCEDURE — 82803 BLOOD GASES ANY COMBINATION: CPT

## 2020-07-11 PROCEDURE — 84300 ASSAY OF URINE SODIUM: CPT | Performed by: EMERGENCY MEDICINE

## 2020-07-11 PROCEDURE — 87150 DNA/RNA AMPLIFIED PROBE: CPT | Performed by: EMERGENCY MEDICINE

## 2020-07-11 PROCEDURE — 25010000002 LEVOFLOXACIN PER 250 MG: Performed by: EMERGENCY MEDICINE

## 2020-07-11 RX ORDER — ONDANSETRON 2 MG/ML
4 INJECTION INTRAMUSCULAR; INTRAVENOUS EVERY 6 HOURS PRN
Status: DISCONTINUED | OUTPATIENT
Start: 2020-07-11 | End: 2020-07-15 | Stop reason: HOSPADM

## 2020-07-11 RX ORDER — SODIUM CHLORIDE 0.9 % (FLUSH) 0.9 %
10 SYRINGE (ML) INJECTION AS NEEDED
Status: DISCONTINUED | OUTPATIENT
Start: 2020-07-11 | End: 2020-07-15 | Stop reason: HOSPADM

## 2020-07-11 RX ORDER — SODIUM CHLORIDE 0.9 % (FLUSH) 0.9 %
10 SYRINGE (ML) INJECTION EVERY 12 HOURS SCHEDULED
Status: DISCONTINUED | OUTPATIENT
Start: 2020-07-11 | End: 2020-07-15 | Stop reason: HOSPADM

## 2020-07-11 RX ORDER — ACETAMINOPHEN 325 MG/1
650 TABLET ORAL EVERY 4 HOURS PRN
Status: DISCONTINUED | OUTPATIENT
Start: 2020-07-11 | End: 2020-07-15 | Stop reason: HOSPADM

## 2020-07-11 RX ORDER — PANTOPRAZOLE SODIUM 40 MG/1
40 TABLET, DELAYED RELEASE ORAL EVERY MORNING
Status: DISCONTINUED | OUTPATIENT
Start: 2020-07-12 | End: 2020-07-12

## 2020-07-11 RX ORDER — METOPROLOL SUCCINATE 25 MG/1
25 TABLET, EXTENDED RELEASE ORAL DAILY
Status: DISCONTINUED | OUTPATIENT
Start: 2020-07-11 | End: 2020-07-15 | Stop reason: HOSPADM

## 2020-07-11 RX ORDER — ALLOPURINOL 100 MG/1
100 TABLET ORAL DAILY
Status: DISCONTINUED | OUTPATIENT
Start: 2020-07-12 | End: 2020-07-15 | Stop reason: HOSPADM

## 2020-07-11 RX ORDER — ASPIRIN 81 MG/1
81 TABLET ORAL DAILY
Status: DISCONTINUED | OUTPATIENT
Start: 2020-07-11 | End: 2020-07-15 | Stop reason: HOSPADM

## 2020-07-11 RX ORDER — SODIUM CHLORIDE 9 MG/ML
125 INJECTION, SOLUTION INTRAVENOUS CONTINUOUS
Status: DISCONTINUED | OUTPATIENT
Start: 2020-07-11 | End: 2020-07-11

## 2020-07-11 RX ORDER — LEVOFLOXACIN 5 MG/ML
750 INJECTION, SOLUTION INTRAVENOUS ONCE
Status: COMPLETED | OUTPATIENT
Start: 2020-07-11 | End: 2020-07-11

## 2020-07-11 RX ORDER — SACCHAROMYCES BOULARDII 250 MG
250 CAPSULE ORAL 2 TIMES DAILY
Status: DISCONTINUED | OUTPATIENT
Start: 2020-07-11 | End: 2020-07-15 | Stop reason: HOSPADM

## 2020-07-11 RX ADMIN — CEFTRIAXONE SODIUM 1 G: 1 INJECTION, POWDER, FOR SOLUTION INTRAMUSCULAR; INTRAVENOUS at 17:55

## 2020-07-11 RX ADMIN — ENOXAPARIN SODIUM 30 MG: 30 INJECTION SUBCUTANEOUS at 17:55

## 2020-07-11 RX ADMIN — SODIUM CHLORIDE, POTASSIUM CHLORIDE, SODIUM LACTATE AND CALCIUM CHLORIDE 2313 ML: 600; 310; 30; 20 INJECTION, SOLUTION INTRAVENOUS at 12:42

## 2020-07-11 RX ADMIN — LEVOFLOXACIN 750 MG: 5 INJECTION, SOLUTION INTRAVENOUS at 13:23

## 2020-07-11 RX ADMIN — METRONIDAZOLE 500 MG: 500 INJECTION, SOLUTION INTRAVENOUS at 15:15

## 2020-07-11 RX ADMIN — SODIUM BICARBONATE 125 ML/HR: 84 INJECTION, SOLUTION INTRAVENOUS at 21:39

## 2020-07-11 RX ADMIN — SODIUM CHLORIDE 125 ML/HR: 9 INJECTION, SOLUTION INTRAVENOUS at 16:39

## 2020-07-11 RX ADMIN — Medication 250 MG: at 20:51

## 2020-07-11 NOTE — ED PROVIDER NOTES
Subjective   Patient came into the ED with complaints for weakness generalized weakness and diarrhea this diarrhea is been going on since February.  He has lost weight and does not feel well subsequently in the ER for evaluation      Weakness - Generalized   Severity:  Moderate  Onset quality:  Gradual  Timing:  Constant  Progression:  Worsening  Chronicity:  New  Context: not alcohol use, not allergies, not change in medication, not drug use and not increased activity    Relieved by:  Nothing  Worsened by:  Nothing  Ineffective treatments:  None tried  Associated symptoms: diarrhea    Associated symptoms: no abdominal pain, no anorexia, no arthralgias, no ataxia, no cough, no difficulty walking, no dizziness, no drooling, no numbness in extremities, no falls, no fever, no foul-smelling urine, no frequency, no loss of consciousness, no melena, no nausea, no seizures, no sensory-motor deficit, no shortness of breath, no syncope and no vision change    Risk factors: no anemia, no congestive heart failure and no family hx of stroke        Review of Systems   Constitutional: Negative.  Negative for fever.   HENT: Negative.  Negative for drooling.    Eyes: Negative.    Respiratory: Negative.  Negative for cough and shortness of breath.    Cardiovascular: Negative.  Negative for syncope.   Gastrointestinal: Positive for diarrhea. Negative for abdominal pain, anorexia, melena and nausea.   Endocrine: Negative.    Genitourinary: Negative.  Negative for frequency.   Musculoskeletal: Negative for arthralgias and falls.   Skin: Negative.    Neurological: Negative for dizziness, seizures and loss of consciousness.   Hematological: Negative.    All other systems reviewed and are negative.      Past Medical History:   Diagnosis Date   • Acid reflux    • Allergic rhinitis    • Chronic mucoid otitis media    • Chronic rhinitis    • Chronic sinusitis    • Coronary artery disease     HEART BYPASS 2004   • Eustachian tube dysfunction     • Heart disease    • Hypertension    • Mixed hearing loss of left ear    • Perianal abscess    • Sensorineural hearing loss    • Tinnitus        Allergies   Allergen Reactions   • Lortab [Hydrocodone-Acetaminophen] Other (See Comments) and Hallucinations     CLOSTROPHOBIC       Past Surgical History:   Procedure Laterality Date   • COLONOSCOPY N/A 7/2/2020    Procedure: COLONOSCOPY WITH ANESTHESIA;  Surgeon: Adrien Brewster MD;  Location: [facility] ENDOSCOPY;  Service: Gastroenterology;  Laterality: N/A;  pre op: diarrhea  post op: polyps  PCP: Joe Velasco MD   • CORONARY ARTERY BYPASS GRAFT     • INCISION AND DRAINAGE PERIRECTAL ABSCESS N/A 3/3/2017    Procedure: INCISION AND DRAINAGE OF JEET ANAL ABSCESS;  Surgeon: Lynette Smith MD;  Location: [facility] OR;  Service:    • MYRINGOTOMY W/ TUBES Left 04/17/2017    06/10/2016   • TONSILLECTOMY     • TOTAL HIP ARTHROPLASTY         Family History   Problem Relation Age of Onset   • No Known Problems Mother    • No Known Problems Father    • Colon cancer Neg Hx    • Colon polyps Neg Hx        Social History     Socioeconomic History   • Marital status:      Spouse name: Not on file   • Number of children: Not on file   • Years of education: Not on file   • Highest education level: Not on file   Tobacco Use   • Smoking status: Former Smoker   • Smokeless tobacco: Never Used   • Tobacco comment: quit 2010   Substance and Sexual Activity   • Alcohol use: Yes     Alcohol/week: 14.0 standard drinks     Types: 14 Cans of beer per week     Comment: 2 beers daily   • Drug use: No   • Sexual activity: Defer           Objective   Physical Exam   Constitutional: He is oriented to person, place, and time. He appears well-developed and well-nourished.  Non-toxic appearance.   HENT:   Head: Normocephalic and atraumatic.   Mouth/Throat: Oropharynx is clear and moist.   Eyes: Pupils are equal, round, and reactive to light. Conjunctivae are normal.   Neck: Normal range of  motion. Neck supple. No hepatojugular reflux and no JVD present.   Cardiovascular: Normal rate, regular rhythm, normal heart sounds and intact distal pulses. PMI is not displaced. Exam reveals no decreased pulses.   No murmur heard.  Pulmonary/Chest: Effort normal and breath sounds normal. No accessory muscle usage. No apnea. No respiratory distress. He has no decreased breath sounds. He has no wheezes.   Abdominal: Normal appearance, normal aorta and bowel sounds are normal. He exhibits no shifting dullness, no distension, no fluid wave, no abdominal bruit, no ascites, no pulsatile midline mass and no mass. There is no tenderness. There is no guarding.   Musculoskeletal: Normal range of motion.   Neurological: He is alert and oriented to person, place, and time. He has normal strength and normal reflexes. No cranial nerve deficit. GCS eye subscore is 4. GCS verbal subscore is 5. GCS motor subscore is 6.   Skin: Skin is warm and dry.   Psychiatric: He has a normal mood and affect. His behavior is normal.   Nursing note and vitals reviewed.      Procedures           ED Course  ED Course as of Jul 11 1356   Sat Jul 11, 2020   1319 . Right inguinal hernia containing small bowel. The bowel is not  obstructed at this time.  2. Extensive vascular calcifications present in the abdominal aorta and  iliac arteries.  3. 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 Diverticulosis.  This was discussed with patient    [TS]   1351 Patient with diarrhea for approximately 4 to 5 months colonoscopy recently.  Lab work-up was performed there is vascular calcification but no evidence of any that got he does have acute renal failure with a UTI none no obstruction.  Was given IV antibiotics can be admitted to the hospital for further evaluation treatment IV fluids were initiated.  This has been discussed the patient at length    [TS]   1352 As far as his right inguinal hernia is concerned he has had it for many years  and he is aware of.    [TS]   4724 The renal failure could be because of a toxin producing enteropathogenic bacteria we have not been able to obtain a stool sample he is not thrombocytopenic but the possibility of HUS cannot be ruled out completely    [TS]      ED Course User Index  [TS] Matias Kelley MD                                           MDM  Number of Diagnoses or Management Options  Diagnosis management comments: Weakness and diarrhea colonoscopy seem to be multiple polyps which were removed and hemosiderin deposits which could be consistent with resolving or new infection       Amount and/or Complexity of Data Reviewed  Clinical lab tests: ordered and reviewed  Tests in the radiology section of CPT®: ordered  Tests in the medicine section of CPT®: reviewed and ordered    Risk of Complications, Morbidity, and/or Mortality  Presenting problems: moderate  Diagnostic procedures: moderate  Management options: moderate        Final diagnoses:   AMAYA (acute kidney injury) (CMS/HCC)   Hyponatremia   Diarrhea, unspecified type   Acute UTI (urinary tract infection)            Matias Kelley MD  07/11/20 3580

## 2020-07-11 NOTE — H&P
[facility] Medicine Services  HISTORY AND PHYSICAL    Date of Admission: 7/11/2020  Primary Care Physician: Joe Velasco MD    Subjective     Chief Complaint: Weakness    History of Present Illness  Mr. Hugo is a pleasant 70-year-old  male who follows Dr. Joe Velasco for primary care.  He has a medical history significant for hypertension, coronary artery disease status post coronary artery bypass grafting, and GERD.  The patient presents to the emergency department today with a 4-month progressive history of weakness.  The patient states he has had a poor appetite with a 7 pound weight loss during this timeframe.  He has had diarrhea waxing and waning in intensity since February.  He recently underwent a colonoscopy on 7/2 per Dr. Brewster.  This showed Hemosiderin staining in the distal colon, question resolving or ongoing colitis.  Patient was prescribed a Medrol Dose Pack.  Multiple polyps were removed, several which showed mild active inflammation on pathology.  The patient states he has been drinking well, and states he feels as though he may have even been drinking excessively, but is unable to exactly quantify how much he is drinking.    The patient denies any chest pain or shortness of breath.  He has perhaps had some chills, but denies fever.  He has had no upper respiratory congestion or cough.  He does complain of dysuria, as well as urinary frequency and urgency.  He has been trying to stay at home as much as possible given the recent pandemic, and denies any sick contacts that he is aware of.    Review of Systems   Constitutional: Positive for appetite change, chills, fatigue and unexpected weight change. Negative for fever.   HENT: Negative.    Respiratory: Negative for cough and shortness of breath.    Cardiovascular: Negative for chest pain and leg swelling.   Gastrointestinal: Positive for diarrhea. Negative for abdominal pain, blood in stool,  nausea and vomiting.   Genitourinary: Positive for dysuria, frequency and urgency. Negative for decreased urine volume.   Musculoskeletal: Negative.    Skin: Negative.    Neurological: Positive for weakness. Negative for dizziness.      Otherwise complete ROS reviewed and negative except as mentioned in the HPI.    Past Medical History:   Past Medical History:   Diagnosis Date   • Acid reflux    • Allergic rhinitis    • Chronic mucoid otitis media    • Chronic rhinitis    • Chronic sinusitis    • Coronary artery disease     HEART BYPASS 2004   • Eustachian tube dysfunction    • Heart disease    • Hypertension    • Mixed hearing loss of left ear    • Perianal abscess    • Sensorineural hearing loss    • Tinnitus      Past Surgical History:  Past Surgical History:   Procedure Laterality Date   • COLONOSCOPY N/A 7/2/2020    Procedure: COLONOSCOPY WITH ANESTHESIA;  Surgeon: Adrien Brewster MD;  Location: [facility] ENDOSCOPY;  Service: Gastroenterology;  Laterality: N/A;  pre op: diarrhea  post op: polyps  PCP: Joe Velasco MD   • CORONARY ARTERY BYPASS GRAFT     • INCISION AND DRAINAGE PERIRECTAL ABSCESS N/A 3/3/2017    Procedure: INCISION AND DRAINAGE OF JEET ANAL ABSCESS;  Surgeon: Lynette Smith MD;  Location: [facility] OR;  Service:    • MYRINGOTOMY W/ TUBES Left 04/17/2017    06/10/2016   • TONSILLECTOMY     • TOTAL HIP ARTHROPLASTY       Social History:  reports that he has quit smoking. He has never used smokeless tobacco. He reports that he drinks about 14.0 standard drinks of alcohol per week. He reports that he does not use drugs.  Patient used to drink beer daily, states he has not had any beer in about a month.    Family History: family history includes No Known Problems in his father and mother.       Allergies:  Allergies   Allergen Reactions   • Lortab [Hydrocodone-Acetaminophen] Other (See Comments) and Hallucinations     CLOSTROPHOBIC     Medications:  Prior to Admission medications    Medication  Sig Start Date End Date Taking? Authorizing Provider   albuterol (PROVENTIL HFA;VENTOLIN HFA) 108 (90 BASE) MCG/ACT inhaler Every 6 (Six) Hours.    Twyla Guevara MD   amLODIPine (NORVASC) 10 MG tablet Take 10 mg by mouth daily    Twyla Guevara MD   aspirin (ASPIR) 81 MG EC tablet Take 81 mg by mouth Daily.    Twyla Guevara MD   azelastine (ASTELIN) 0.1 % nasal spray 2 sprays into each nostril 2 (Two) Times a Day. Use in each nostril as directed    Twyla Guevara MD   CloNIDine (CATAPRES) 0.2 MG tablet Take 0.2 mg by mouth 3 times daily    Twyla Guevara MD   desoximetasone (TOPICORT) 0.25 % cream APPLY EXTERNALLY TO THE AFFECTED AREA TWICE DAILY AS DIRECTED 5/26/20   Joe Velasco MD   diphenoxylate-atropine (LOMOTIL) 2.5-0.025 MG per tablet TAKE 1 TABLET BY MOUTH FOUR TIMES DAILY AS NEEDED FOR DIARRHEA 6/9/20   Joe Velasco MD   fexofenadine (ALLEGRA) 180 MG tablet Take 180 mg by mouth Daily.    Twyla Guevara MD   fluticasone (FLONASE) 50 MCG/ACT nasal spray USE 1 SPRAY IN EACH NOSTRIL TWICE A DAY 9/25/17   Lacy Crane APRN   magnesium chloride ER (MAG-DELAY) 535 (64 MG) MG CR tablet Take 64 mg by mouth daily    Twyla Guevara MD   methylPREDNISolone (MEDROL, JYOTSNA,) 4 MG tablet Take as directed on package instructions. 7/2/20   Adrien Brewster MD   metoprolol succinate XL (TOPROL-XL) 25 MG 24 hr tablet Take 1 tablet by mouth Daily. 5/18/20   Joe Velasco MD   Multiple Vitamins-Minerals (PRESERVISION/LUTEIN PO) Take  by mouth.    Twyla Guevara MD   Omeprazole Magnesium 20.6 (20 BASE) MG capsule delayed-release Take by mouth daily    Twyla Guevara MD   sildenafil (REVATIO) 20 MG tablet TAKE 2 3 TABLETS BY MOUTH 30 MINS PRIOR TO INTERCOURSE 5/16/20   Twyla Guevara MD   valsartan (DIOVAN) 160 MG tablet TAKE 1 TABLET DAILY 6/19/20   Joe Velasco MD     Objective     Vital Signs: /56   Pulse 67   Temp 97.8 °F  "(36.6 °C) (Oral)   Resp 18   Ht 182.9 cm (72\")   Wt 77.1 kg (170 lb)   SpO2 99%   BMI 23.06 kg/m²   Physical Exam   Constitutional: He is oriented to person, place, and time. He appears well-developed and well-nourished. No distress.   HENT:   Head: Normocephalic and atraumatic.   Neck: Normal range of motion. Neck supple. No JVD present. No tracheal deviation present.   Cardiovascular: Normal rate and intact distal pulses. Exam reveals no gallop and no friction rub.   Murmur heard.  Irregular rhythm   Pulmonary/Chest: Effort normal and breath sounds normal. He has no wheezes. He has no rales.   Abdominal: Soft. Bowel sounds are normal. He exhibits no distension. There is no tenderness.   Musculoskeletal: Normal range of motion. He exhibits no edema or tenderness.   Neurological: He is alert and oriented to person, place, and time. No cranial nerve deficit. Coordination normal.   Skin: Skin is warm and dry. No rash noted. No erythema.   Psychiatric: He has a normal mood and affect. His behavior is normal. Judgment and thought content normal.   Vitals reviewed.    Results Reviewed:  Lab Results (last 24 hours)     Procedure Component Value Units Date/Time    Urinalysis, Microscopic Only - Urine, Clean Catch [[id number]]  (Abnormal) Collected:  07/11/20 1213    Specimen:  Urine, Clean Catch Updated:  07/11/20 1300     RBC, UA None Seen /HPF      WBC, UA Too Numerous to Count /HPF      Bacteria, UA 4+ /HPF      Squamous Epithelial Cells, UA None Seen /HPF      Methodology Automated Microscopy    Urine Culture - Urine, Urine, Clean Catch [[id number]] Collected:  07/11/20 1213    Specimen:  Urine, Clean Catch Updated:  07/11/20 1300    Sodium, Urine, Random - Urine, Clean Catch [[id number]] Collected:  07/11/20 1213    Specimen:  Urine, Clean Catch Updated:  07/11/20 1258     Sodium, Urine 25 mmol/L     Narrative:       Reference intervals for random urine have not been established.  Clinical usage is dependent upon " physician's interpretation in combination with other laboratory tests.       COVID-19, [facility] IN-HOUSE,NP Swab (NO TRANSPORT MEDIA) 2 HR TAT - Swab, Nasopharynx [[id number]]  (Normal) Collected:  07/11/20 1215    Specimen:  Swab from Nasopharynx Updated:  07/11/20 1251     COVID19 Not Detected    Narrative:       Fact sheet for providers: https://www.fda.gov/media/453775/download     Fact sheet for patients: https://www.fda.gov/media/565217/download    Urinalysis With Culture If Indicated - Urine, Clean Catch [[id number]]  (Abnormal) Collected:  07/11/20 1213    Specimen:  Urine, Clean Catch Updated:  07/11/20 1247     Color, UA Yellow     Appearance, UA Turbid     pH, UA 5.5     Specific Gravity, UA <=1.005     Glucose, UA Negative     Ketones, UA Negative     Bilirubin, UA Negative     Blood, UA Moderate (2+)     Protein, UA 30 mg/dL (1+)     Leuk Esterase, UA Large (3+)     Nitrite, UA Negative     Urobilinogen, UA 0.2 E.U./dL    Blood Culture - Blood, Arm, Right [[id number]] Collected:  07/11/20 1211    Specimen:  Blood from Arm, Right Updated:  07/11/20 1241    Creatinine, Urine, Random - Urine, Clean Catch [[id number]] Collected:  07/11/20 1213    Specimen:  Urine, Clean Catch Updated:  07/11/20 1231    Lactic Acid, Plasma [[id number]]  (Normal) Collected:  07/11/20 1154    Specimen:  Blood Updated:  07/11/20 1221     Lactate 1.7 mmol/L     Blood Culture - Blood, Hand, Right [[id number]] Collected:  07/11/20 1154    Specimen:  Blood from Hand, Right Updated:  07/11/20 1207    Comprehensive Metabolic Panel [[id number]]  (Abnormal) Collected:  07/11/20 1114    Specimen:  Blood Updated:  07/11/20 1138     Glucose 179 mg/dL      BUN 94 mg/dL      Creatinine 4.38 mg/dL      Sodium 129 mmol/L      Potassium 4.2 mmol/L      Chloride 95 mmol/L      CO2 18.0 mmol/L      Calcium 8.5 mg/dL      Total Protein 6.6 g/dL      Albumin 2.70 g/dL      ALT (SGPT) 48 U/L      AST (SGOT) 30 U/L      Alkaline Phosphatase 228 U/L       Total Bilirubin 0.6 mg/dL      eGFR Non African Amer 13 mL/min/1.73      Comment: <15 Indicative of kidney failure.        eGFR   Amer --     Comment: <15 Indicative of kidney failure.        Globulin 3.9 gm/dL      A/G Ratio 0.7 g/dL      BUN/Creatinine Ratio 21.5     Anion Gap 16.0 mmol/L     Narrative:       GFR Normal >60  Chronic Kidney Disease <60  Kidney Failure <15      CBC & Differential [[id number]] Collected:  07/11/20 1114    Specimen:  Blood Updated:  07/11/20 1122    Narrative:       The following orders were created for panel order CBC & Differential.  Procedure                               Abnormality         Status                     ---------                               -----------         ------                     CBC Auto Differential[[id number]]        Abnormal            Final result                 Please view results for these tests on the individual orders.    CBC Auto Differential [[id number]]  (Abnormal) Collected:  07/11/20 1114    Specimen:  Blood Updated:  07/11/20 1122     WBC 19.49 10*3/mm3      RBC 3.43 10*6/mm3      Hemoglobin 11.1 g/dL      Hematocrit 33.7 %      MCV 98.3 fL      MCH 32.4 pg      MCHC 32.9 g/dL      RDW 14.6 %      RDW-SD 52.3 fl      MPV 10.5 fL      Platelets 291 10*3/mm3      Neutrophil % 84.7 %      Lymphocyte % 7.4 %      Monocyte % 4.7 %      Eosinophil % 0.1 %      Basophil % 0.4 %      Immature Grans % 2.7 %      Neutrophils, Absolute 16.51 10*3/mm3      Lymphocytes, Absolute 1.45 10*3/mm3      Monocytes, Absolute 0.91 10*3/mm3      Eosinophils, Absolute 0.02 10*3/mm3      Basophils, Absolute 0.07 10*3/mm3      Immature Grans, Absolute 0.53 10*3/mm3      nRBC 0.0 /100 WBC         Imaging Results (Last 24 Hours)     Procedure Component Value Units Date/Time    CT Abdomen Pelvis Without Contrast [[id number]] Collected:  07/11/20 1258     Updated:  07/11/20 1307    Narrative:       EXAMINATION:   CT ABDOMEN PELVIS WO CONTRAST-  7/11/2020 12:58 PM CDT      HISTORY: CT ABDOMEN PELVIS WO CONTRAST- 7/11/2020 12:33 PM CDT     HISTORY: Flank pain, recurrent stone disease suspected      COMPARISON: 05/27/2020      DOSE LENGTH PRODUCT: 235 mGy cm. Automated exposure control was also  utilized to decrease patient radiation dose.     TECHNIQUE: Noncontrast enhanced images of the abdomen and pelvis  obtained without oral contrast. Multiplanar reformatted images were  provided for review.      FINDINGS:   The lung bases and base of the heart are unremarkable.      LIVER: No focal liver lesion.      BILIARY SYSTEM: The gallbladder is unremarkable. No intrahepatic or  extrahepatic ductal dilatation.      PANCREAS: No focal pancreatic lesion.      SPLEEN: Unremarkable.      KIDNEYS AND ADRENALS: Adrenal glands are visualized. The renal contours  are normal in size shape and position. Vascular calcifications present  in the left renal hilum. Stranding around the right and left kidney is  noted..  The ureters are decompressed and normal in appearance.     RETROPERITONEUM: No mass, lymphadenopathy or hemorrhage.      GI TRACT: No evidence of obstruction or bowel wall thickening.  Diverticulosis is present..     OTHER: There is no mesenteric mass, lymphadenopathy or fluid collection.  The osseous structures and soft tissues demonstrate no worrisome  lesions. Right hip prosthesis is present. Avascular necrosis the left  femoral head with associated collapse is also noted. Extensive vascular  calcifications present in the abdominal aorta and iliac arteries.      PELVIS: A right inguinal hernia is present. This contains small bowel.  There is no associated obstruction at this time.. The urinary bladder is  normal in appearance.       Impression:       1. Right inguinal hernia containing small bowel. The bowel is not  obstructed at this time.  2. Extensive vascular calcifications present in the abdominal aorta and  iliac arteries.  3. Avascular necrosis left femoral head and associated  collapse  4. Diverticulosis.         This report was finalized on 07/11/2020 13:04 by Dr. Donald Rhodes MD.        I have personally reviewed and interpreted the radiology studies and ECG obtained at time of admission.     Assessment / Plan     Assessment:   Active Hospital Problems    Diagnosis   • AMAYA (acute kidney injury) (CMS/[facility])   • Avascular necrosis of femoral head, left (CMS/HCC)   • Hyponatremia   • Acute cystitis   • Metabolic acidosis   • Hyperglycemia   • Weight loss   • Diarrhea     Plan:   1.  Admit inpatient to the hospitalist service  2.  Acute kidney injury with BUN of 94 and creatinine of 4.3.  No previously known history of chronic kidney disease.  All available previous laboratory studies show creatinine ranging from 0.7-1.3.  Consult nephrology.  IV fluids with normal saline at 125 mL/h for now.  Check ABG to check pH.  Baseline renal ultrasound.  Baseline urine studies.  Send labs for uric acid, magnesium, phosphorus, PTH, vitamin D.  3.  EKG now- rhythm sounded irregular during my exam- discussed with nurse in ED.  Monitor on telemetry.  4.  Rocephin 1 g IV daily.  Follow urine and blood culture results.  White blood cell count 19,000, keeping in mind patient recently finished a Medrol Dosepak as an outpatient.  5.  Hyperglycemia noted on labs with blood glucose of 179.  As patient reports unintentional weight loss and polydipsia as well, will check a hemoglobin A1c.  6.  Recent negative GI panel and C. difficile on 6/4/2020.  Will recheck GI panel.  If negative, will trial cholestyramine.  Place on probiotic.  7.  Resume home medications as appropriate.  Hold valsartan.  8.  Check TSH  9.  PT/OT consults  10.  Nutrition consult  11.  Orthopedic surgery consult secondary to left femoral head avascular necrosis.  12.  Work-up ongoing  13.  Labs in a.m.    Code Status: Full.  In the event the patient is unable to speak for himself he designates his friend Mariam as his healthcare  surrogate.     I discussed the patients findings and my recommendations with the patient, friend at bedside, and Dr. Jamie Pineda.    Estimated length of stay: To be determined.    STERLING Nina   07/11/20   15:10      I personally evaluated and examined the patient in conjunction with STERLING Stubbs and agree with the assessment, treatment plan, and disposition of the patient as recorded by her. My history, exam, and further recommendations are:     Seen and examined.  I am asked to admit the patient for AMAYA, hyponatremia, diarrhea and urinary tract infection.  Patient presented with generalized weakness that is been lingering for the past 4 months.     Diagnostic studies today showed significant amount of pyuria, bacteriuria, large leukocyte esterase  Creatinine is 4.38 with BUN of 94, sodium is 129  CO2 is 18, white count is 19.4, he has macrocytic anemia with a hemoglobin of 11.1.  CRP is 23.95 dated July 2) COVID-19 has been negative on June 29 and today.  He had CT of the abdomen and pelvis that showed right inguinal hernia.  Extensive vascular calcification.  Avascular necrosis left femoral head and associated collapse.  Diverticulosis.    + diarrhea  ?dysuria  No distress  A&O  Lungs cta  No edema but recently said had to take diuretic due to leg edema       · Generalized weakness  · Azotemia/acute kidney injury  · Pyuria/bacteriuria -suspected urinary tract infection  · Leukocytosis with elevated CRP from July 2  · Hyponatremia likely from hypovolemia related to diarrhea  · Diarrhea  · Hypovolemia.        Check urine studies, check renal ultrasound, empirically treat with antibiotic, nephrology consultation, follow through laboratory  IV fluid  Ortho consultation regarding vascular necrosis.    Jamie Pineda MD  07/11/20  15:57

## 2020-07-11 NOTE — NURSING NOTE
Dr. Briscoe was notified of the left avascular necrosis and the patient should be followed outpatient in the office. He does not want him to be charged for an inpatient consult when it can be followed as an outpatient. Informed Sharmin Zambrano.     Mireya Loyd RN

## 2020-07-12 PROBLEM — B96.20 E COLI BACTEREMIA: Status: ACTIVE | Noted: 2020-07-12

## 2020-07-12 PROBLEM — R78.81 E COLI BACTEREMIA: Status: ACTIVE | Noted: 2020-07-12

## 2020-07-12 LAB
25(OH)D3 SERPL-MCNC: 23.5 NG/ML (ref 30–100)
ADV 40+41 DNA STL QL NAA+NON-PROBE: NOT DETECTED
ALBUMIN SERPL-MCNC: 2.6 G/DL (ref 3.5–5.2)
ALBUMIN/GLOB SERPL: 0.7 G/DL
ALP SERPL-CCNC: 235 U/L (ref 39–117)
ALT SERPL W P-5'-P-CCNC: 41 U/L (ref 1–41)
ANION GAP SERPL CALCULATED.3IONS-SCNC: 16 MMOL/L (ref 5–15)
AST SERPL-CCNC: 26 U/L (ref 1–40)
ASTRO TYP 1-8 RNA STL QL NAA+NON-PROBE: NOT DETECTED
BACTERIA BLD CULT: ABNORMAL
BASOPHILS # BLD AUTO: 0.07 10*3/MM3 (ref 0–0.2)
BASOPHILS NFR BLD AUTO: 0.4 % (ref 0–1.5)
BILIRUB SERPL-MCNC: 0.5 MG/DL (ref 0–1.2)
BUN SERPL-MCNC: 78 MG/DL (ref 8–23)
BUN/CREAT SERPL: 20.9 (ref 7–25)
C CAYETANENSIS DNA STL QL NAA+NON-PROBE: NOT DETECTED
CALCIUM SPEC-SCNC: 8.3 MG/DL (ref 8.6–10.5)
CAMPY SP DNA.DIARRHEA STL QL NAA+PROBE: NOT DETECTED
CHLORIDE SERPL-SCNC: 101 MMOL/L (ref 98–107)
CK SERPL-CCNC: 18 U/L (ref 20–200)
CO2 SERPL-SCNC: 18 MMOL/L (ref 22–29)
CREAT SERPL-MCNC: 3.74 MG/DL (ref 0.76–1.27)
CREAT UR-MCNC: 18.3 MG/DL
CRYPTOSP STL CULT: NOT DETECTED
DEPRECATED RDW RBC AUTO: 49.4 FL (ref 37–54)
E COLI DNA SPEC QL NAA+PROBE: NOT DETECTED
E HISTOLYT AG STL-ACNC: NOT DETECTED
EAEC PAA PLAS AGGR+AATA ST NAA+NON-PRB: NOT DETECTED
EC STX1 + STX2 GENES STL NAA+PROBE: NOT DETECTED
EOSINOPHIL # BLD AUTO: 0.01 10*3/MM3 (ref 0–0.4)
EOSINOPHIL NFR BLD AUTO: 0.1 % (ref 0.3–6.2)
EOSINOPHIL SPEC QL MICRO: 2 % EOS/100 CELLS (ref 0–0)
EPEC EAE GENE STL QL NAA+NON-PROBE: NOT DETECTED
ERYTHROCYTE [DISTWIDTH] IN BLOOD BY AUTOMATED COUNT: 14.2 % (ref 12.3–15.4)
ETEC LTA+ST1A+ST1B TOX ST NAA+NON-PROBE: NOT DETECTED
G LAMBLIA DNA SPEC QL NAA+PROBE: NOT DETECTED
GFR SERPL CREATININE-BSD FRML MDRD: 16 ML/MIN/1.73
GLOBULIN UR ELPH-MCNC: 3.9 GM/DL
GLUCOSE SERPL-MCNC: 100 MG/DL (ref 65–99)
HCT VFR BLD AUTO: 31.3 % (ref 37.5–51)
HGB BLD-MCNC: 11 G/DL (ref 13–17.7)
IMM GRANULOCYTES # BLD AUTO: 0.35 10*3/MM3 (ref 0–0.05)
IMM GRANULOCYTES NFR BLD AUTO: 2.1 % (ref 0–0.5)
LYMPHOCYTES # BLD AUTO: 1.18 10*3/MM3 (ref 0.7–3.1)
LYMPHOCYTES NFR BLD AUTO: 6.9 % (ref 19.6–45.3)
MCH RBC QN AUTO: 33.3 PG (ref 26.6–33)
MCHC RBC AUTO-ENTMCNC: 35.1 G/DL (ref 31.5–35.7)
MCV RBC AUTO: 94.8 FL (ref 79–97)
MONOCYTES # BLD AUTO: 1.02 10*3/MM3 (ref 0.1–0.9)
MONOCYTES NFR BLD AUTO: 6 % (ref 5–12)
NEUTROPHILS NFR BLD AUTO: 14.37 10*3/MM3 (ref 1.7–7)
NEUTROPHILS NFR BLD AUTO: 84.5 % (ref 42.7–76)
NOROVIRUS GI+II RNA STL QL NAA+NON-PROBE: NOT DETECTED
NRBC BLD AUTO-RTO: 0 /100 WBC (ref 0–0.2)
P SHIGELLOIDES DNA STL QL NAA+PROBE: NOT DETECTED
PLATELET # BLD AUTO: 260 10*3/MM3 (ref 140–450)
PMV BLD AUTO: 10.7 FL (ref 6–12)
POTASSIUM SERPL-SCNC: 3.3 MMOL/L (ref 3.5–5.2)
PROT SERPL-MCNC: 6.5 G/DL (ref 6–8.5)
PTH-INTACT SERPL-MCNC: 65 PG/ML (ref 15–65)
RBC # BLD AUTO: 3.3 10*6/MM3 (ref 4.14–5.8)
RV RNA STL NAA+PROBE: NOT DETECTED
SALMONELLA DNA SPEC QL NAA+PROBE: NOT DETECTED
SAPO I+II+IV+V RNA STL QL NAA+NON-PROBE: NOT DETECTED
SHIGELLA SP+EIEC IPAH STL QL NAA+PROBE: NOT DETECTED
SODIUM SERPL-SCNC: 135 MMOL/L (ref 136–145)
UUN 24H UR-MCNC: 221 MG/DL
V CHOLERAE DNA SPEC QL NAA+PROBE: NOT DETECTED
VIBRIO DNA SPEC NAA+PROBE: NOT DETECTED
WBC # BLD AUTO: 17 10*3/MM3 (ref 3.4–10.8)
YERSINIA STL CULT: NOT DETECTED

## 2020-07-12 PROCEDURE — 25010000002 CEFTRIAXONE PER 250 MG: Performed by: NURSE PRACTITIONER

## 2020-07-12 PROCEDURE — 80053 COMPREHEN METABOLIC PANEL: CPT | Performed by: NURSE PRACTITIONER

## 2020-07-12 PROCEDURE — 97165 OT EVAL LOW COMPLEX 30 MIN: CPT | Performed by: OCCUPATIONAL THERAPIST

## 2020-07-12 PROCEDURE — 85025 COMPLETE CBC W/AUTO DIFF WBC: CPT | Performed by: NURSE PRACTITIONER

## 2020-07-12 PROCEDURE — 0097U HC BIOFIRE FILMARRAY GI PANEL: CPT | Performed by: NURSE PRACTITIONER

## 2020-07-12 PROCEDURE — 82550 ASSAY OF CK (CPK): CPT | Performed by: INTERNAL MEDICINE

## 2020-07-12 PROCEDURE — 83970 ASSAY OF PARATHORMONE: CPT | Performed by: NURSE PRACTITIONER

## 2020-07-12 PROCEDURE — 25010000002 ENOXAPARIN PER 10 MG: Performed by: NURSE PRACTITIONER

## 2020-07-12 PROCEDURE — 82306 VITAMIN D 25 HYDROXY: CPT | Performed by: NURSE PRACTITIONER

## 2020-07-12 RX ORDER — POTASSIUM CHLORIDE 750 MG/1
30 CAPSULE, EXTENDED RELEASE ORAL ONCE
Status: COMPLETED | OUTPATIENT
Start: 2020-07-12 | End: 2020-07-12

## 2020-07-12 RX ORDER — DESOXIMETASONE 2.5 MG/G
1 CREAM TOPICAL 2 TIMES DAILY
COMMUNITY
End: 2020-07-20

## 2020-07-12 RX ORDER — POTASSIUM CHLORIDE 750 MG/1
40 CAPSULE, EXTENDED RELEASE ORAL ONCE
Status: COMPLETED | OUTPATIENT
Start: 2020-07-12 | End: 2020-07-12

## 2020-07-12 RX ORDER — CLONIDINE HYDROCHLORIDE 0.2 MG/1
0.2 TABLET ORAL EVERY 8 HOURS SCHEDULED
Status: DISCONTINUED | OUTPATIENT
Start: 2020-07-12 | End: 2020-07-15 | Stop reason: HOSPADM

## 2020-07-12 RX ORDER — CHOLESTYRAMINE 4 G/9G
1 POWDER, FOR SUSPENSION ORAL DAILY
Status: DISCONTINUED | OUTPATIENT
Start: 2020-07-12 | End: 2020-07-15 | Stop reason: HOSPADM

## 2020-07-12 RX ORDER — DIPHENOXYLATE HYDROCHLORIDE AND ATROPINE SULFATE 2.5; .025 MG/1; MG/1
1 TABLET ORAL 4 TIMES DAILY PRN
Status: ON HOLD | COMMUNITY
End: 2020-08-21 | Stop reason: SDDI

## 2020-07-12 RX ORDER — FAMOTIDINE 20 MG/1
20 TABLET, FILM COATED ORAL
Status: DISCONTINUED | OUTPATIENT
Start: 2020-07-12 | End: 2020-07-12

## 2020-07-12 RX ORDER — FAMOTIDINE 20 MG/1
20 TABLET, FILM COATED ORAL DAILY
Status: DISCONTINUED | OUTPATIENT
Start: 2020-07-12 | End: 2020-07-15 | Stop reason: HOSPADM

## 2020-07-12 RX ORDER — VALSARTAN 160 MG/1
160 TABLET ORAL DAILY
COMMUNITY
End: 2020-07-15 | Stop reason: HOSPADM

## 2020-07-12 RX ORDER — LANOLIN ALCOHOL/MO/W.PET/CERES
3 CREAM (GRAM) TOPICAL NIGHTLY PRN
Status: DISCONTINUED | OUTPATIENT
Start: 2020-07-12 | End: 2020-07-15 | Stop reason: HOSPADM

## 2020-07-12 RX ORDER — AMLODIPINE BESYLATE 10 MG/1
10 TABLET ORAL DAILY
Status: DISCONTINUED | OUTPATIENT
Start: 2020-07-12 | End: 2020-07-15 | Stop reason: HOSPADM

## 2020-07-12 RX ADMIN — SODIUM BICARBONATE 125 ML/HR: 84 INJECTION, SOLUTION INTRAVENOUS at 06:20

## 2020-07-12 RX ADMIN — CEFTRIAXONE SODIUM 2 G: 2 INJECTION, POWDER, FOR SOLUTION INTRAMUSCULAR; INTRAVENOUS at 17:15

## 2020-07-12 RX ADMIN — Medication 250 MG: at 20:16

## 2020-07-12 RX ADMIN — AMLODIPINE BESYLATE 10 MG: 10 TABLET ORAL at 12:33

## 2020-07-12 RX ADMIN — SODIUM CHLORIDE, PRESERVATIVE FREE 10 ML: 5 INJECTION INTRAVENOUS at 20:16

## 2020-07-12 RX ADMIN — POTASSIUM CHLORIDE 40 MEQ: 750 CAPSULE, EXTENDED RELEASE ORAL at 09:30

## 2020-07-12 RX ADMIN — CLONIDINE HYDROCHLORIDE 0.2 MG: 0.2 TABLET ORAL at 21:45

## 2020-07-12 RX ADMIN — ASPIRIN 81 MG: 81 TABLET ORAL at 09:30

## 2020-07-12 RX ADMIN — ALLOPURINOL 100 MG: 100 TABLET ORAL at 09:30

## 2020-07-12 RX ADMIN — PANTOPRAZOLE SODIUM 40 MG: 40 TABLET, DELAYED RELEASE ORAL at 06:20

## 2020-07-12 RX ADMIN — CHOLESTYRAMINE 1 PACKET: 4 POWDER, FOR SUSPENSION ORAL at 12:33

## 2020-07-12 RX ADMIN — CLONIDINE HYDROCHLORIDE 0.2 MG: 0.2 TABLET ORAL at 15:41

## 2020-07-12 RX ADMIN — POTASSIUM CHLORIDE 30 MEQ: 750 CAPSULE, EXTENDED RELEASE ORAL at 15:41

## 2020-07-12 RX ADMIN — METOPROLOL SUCCINATE 25 MG: 25 TABLET, EXTENDED RELEASE ORAL at 09:31

## 2020-07-12 RX ADMIN — SODIUM BICARBONATE 125 ML/HR: 84 INJECTION, SOLUTION INTRAVENOUS at 17:15

## 2020-07-12 RX ADMIN — Medication 3 MG: at 21:45

## 2020-07-12 RX ADMIN — Medication 250 MG: at 09:30

## 2020-07-12 RX ADMIN — FAMOTIDINE 20 MG: 20 TABLET, FILM COATED ORAL at 12:33

## 2020-07-12 RX ADMIN — ENOXAPARIN SODIUM 30 MG: 30 INJECTION SUBCUTANEOUS at 17:15

## 2020-07-12 NOTE — PROGRESS NOTES
[facility] Medicine Services  INPATIENT PROGRESS NOTE    Length of Stay: 1  Date of Admission: 7/11/2020  Primary Care Physician: Joe Velasco MD    Subjective   Chief Complaint: Follow-up weakness  HPI   Patient resting in bed.  He states he is feeling better today, and feels as though he has a little more energy.  He states he also feels hungry for the first time in a long time.  He denies any chest pain or shortness of breath.  He denies abdominal pain.  He has had 2 bowel movements this morning, soft consistency.    Review of Systems   All pertinent negatives and positives are as above. All other systems have been reviewed and are negative unless otherwise stated.     Objective    Temp:  [97.7 °F (36.5 °C)-100.8 °F (38.2 °C)] 98.5 °F (36.9 °C)  Heart Rate:  [63-82] 82  Resp:  [18] 18  BP: (141-182)/(49-69) 159/59  Physical Exam  Constitutional: He is oriented to person, place, and time. He appears well-developed and well-nourished. No distress.   HENT:   Head: Normocephalic and atraumatic.   Neck: Normal range of motion. Neck supple. No JVD present. No tracheal deviation present.   Cardiovascular: Normal rate and intact distal pulses. Exam reveals no gallop and no friction rub.   Murmur heard.  Sinus 77-88 with PACs and PVCs  Pulmonary/Chest: Effort normal and breath sounds normal. He has no wheezes. He has no rales.   Abdominal: Soft. Bowel sounds are normal. He exhibits no distension. There is no tenderness.   Musculoskeletal: Normal range of motion. He exhibits no edema or tenderness.   Neurological: He is alert and oriented to person, place, and time. No cranial nerve deficit. Coordination normal.   Skin: Skin is warm and dry. No rash noted. No erythema.   Psychiatric: He has a normal mood and affect. His behavior is normal. Judgment and thought content normal.   Vitals reviewed    Results Review:  I have reviewed the labs, radiology results, and diagnostic  studies.    Laboratory Data:   Results from last 7 days   Lab Units 07/12/20  0705 07/11/20  1114   WBC 10*3/mm3 17.00* 19.49*   HEMOGLOBIN g/dL 11.0* 11.1*   HEMATOCRIT % 31.3* 33.7*   PLATELETS 10*3/mm3 260 291     Results from last 7 days   Lab Units 07/12/20  0705 07/11/20  1114   SODIUM mmol/L 135* 129*   POTASSIUM mmol/L 3.3* 4.2   CHLORIDE mmol/L 101 95*   CO2 mmol/L 18.0* 18.0*   BUN mg/dL 78* 94*   CREATININE mg/dL 3.74* 4.38*   CALCIUM mg/dL 8.3* 8.5*   BILIRUBIN mg/dL 0.5 0.6   ALK PHOS U/L 235* 228*   ALT (SGPT) U/L 41 48*   AST (SGOT) U/L 26 30   GLUCOSE mg/dL 100* 179*     Radiology Data:   Imaging Results (Last 24 Hours)     Procedure Component Value Units Date/Time    US Renal Bilateral [[id number]] Collected:  07/11/20 1755     Updated:  07/11/20 1800    Narrative:       EXAMINATION:  US RENAL BILATERAL-  7/11/2020 5:46 PM CDT     HISTORY: Acute renal insufficiency.      COMPARISON: No comparison study.     TECHNIQUE: Grayscale and color flow imaging were performed.     FINDINGS: The urinary bladder is decompressed. Bladder wall thickening  is likely an artifact of underdistention. The visualized abdominal aorta  and inferior vena cava are normal in caliber. The right kidney measures  13.4 cm and the left kidney measures 13.6 cm. There is a 1.4 x 1.1 x 1.6  cm cyst in the upper to midpole region right kidney. The cortical  thickness and echogenicity are normal. There is no hydronephrosis.       Impression:       1. The renal size, cortical thickness and echogenicity are normal. No  hydronephrosis.  2. Small renal cyst on the right.  3. Bladder wall thickening is likely an artifact of underdistention.  This report was finalized on 07/11/2020 17:57 by Dr. Mikael Gallegos MD.    CT Abdomen Pelvis Without Contrast [[id number]] Collected:  07/11/20 1258     Updated:  07/11/20 1307    Narrative:       EXAMINATION:   CT ABDOMEN PELVIS WO CONTRAST-  7/11/2020 12:58 PM CDT     HISTORY: CT ABDOMEN PELVIS WO  CONTRAST- 7/11/2020 12:33 PM CDT     HISTORY: Flank pain, recurrent stone disease suspected      COMPARISON: 05/27/2020      DOSE LENGTH PRODUCT: 235 mGy cm. Automated exposure control was also  utilized to decrease patient radiation dose.     TECHNIQUE: Noncontrast enhanced images of the abdomen and pelvis  obtained without oral contrast. Multiplanar reformatted images were  provided for review.      FINDINGS:   The lung bases and base of the heart are unremarkable.      LIVER: No focal liver lesion.      BILIARY SYSTEM: The gallbladder is unremarkable. No intrahepatic or  extrahepatic ductal dilatation.      PANCREAS: No focal pancreatic lesion.      SPLEEN: Unremarkable.      KIDNEYS AND ADRENALS: Adrenal glands are visualized. The renal contours  are normal in size shape and position. Vascular calcifications present  in the left renal hilum. Stranding around the right and left kidney is  noted..  The ureters are decompressed and normal in appearance.     RETROPERITONEUM: No mass, lymphadenopathy or hemorrhage.      GI TRACT: No evidence of obstruction or bowel wall thickening.  Diverticulosis is present..     OTHER: There is no mesenteric mass, lymphadenopathy or fluid collection.  The osseous structures and soft tissues demonstrate no worrisome  lesions. Right hip prosthesis is present. Avascular necrosis the left  femoral head with associated collapse is also noted. Extensive vascular  calcifications present in the abdominal aorta and iliac arteries.      PELVIS: A right inguinal hernia is present. This contains small bowel.  There is no associated obstruction at this time.. The urinary bladder is  normal in appearance.       Impression:       1. Right inguinal hernia containing small bowel. The bowel is not  obstructed at this time.  2. Extensive vascular calcifications present in the abdominal aorta and  iliac arteries.  3. 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 Diverticulosis.          This report was finalized on 07/11/2020 13:04 by Dr. Donald Rhodes MD.        I have reviewed the patient current medications.     Assessment/Plan     Active Hospital Problems    Diagnosis   • E coli bacteremia   • AMAYA (acute kidney injury) (CMS/[facility])   • Avascular necrosis of femoral head, left (CMS/[facility])   • Hyponatremia   • Acute cystitis   • Metabolic acidosis   • Hyperglycemia   • Weight loss   • Diarrhea     Plan:  1.  Patient admitted 7/11/2020 with complaints of generalized weakness.  Noted to have an acute kidney injury with BUN of 94 and creatinine of 4.3.    2.  No previously known history of chronic kidney disease.  All available previous laboratory studies show creatinine ranging from 0.7-1.3.  Creatinine improved at 3.74 and BUN is 78 today.  IV fluids with sodium bicarbonate per nephrology, appreciate their assistance.  Renal ultrasound shows a small cyst on the right, but otherwise no abnormalities.  Hyponatremia improved, sodium level 135 today.  Acidosis persists.  pH was normal on venous blood gas yesterday.  Allopurinol added for hyperuricemia.  3.  Urine culture shows growth of E. coli.  Blood cultures are positive for E. coli as well.  Continue Rocephin, increased to 2 g dosing.  Await final culture results and susceptibilities.  4.  GI panel negative.  Will discontinue Protonix in favor of Pepcid.  We will also trial cholestyramine daily.  Continue to monitor closely.  Patient had colonoscopy on 7/2 per Dr. Brewster.  This showed Hemosiderin staining in the distal colon, question resolving or ongoing colitis.  Patient was prescribed a Medrol Dose Pack.  Multiple polyps were removed, several which showed mild active inflammation on pathology.  5.  Blood pressure trend reviewed.  Resumed home medications Norvasc and Clonidine.  Continue Toprol-XL at current dosing.  Continue to hold valsartan with acute kidney injury.  6.  Replace potassium  7.  PT/OT consults  8.  Labs in a.m. to include  repeat blood culture with RODRICK    Discharge Planning: I expect the patient to be discharged to home with home health in 3-4 days.    STERLING Nina   07/12/20   11:04     I personally evaluated and examined the patient in conjunction with STERLING Stubbs and agree with the assessment, treatment plan, and disposition of the patient as recorded by her. My history, exam, and further recommendations are:     Jamie Pineda MD   Physician   Medicine   Progress Notes   Incomplete   Date of Service:  07/12/20 0950   Creation Time:  07/12/20 0950            Incomplete             70-year-old man admitted yesterday for acute kidney injury/azotemia, pyuria bacteriuria and suspected urinary tract infection, leukocytosis with elevated CRP from July 2, hyponatremia likely from hypovolemia related to diarrhea.   He now has bacteremia with PCR's stating E. coli.  T-max is 100.8.    Renal ultrasound did not show evidence of hydronephrosis.  White count is improving at 17,000 with predominance of neutrophils at 84.5%.  Creatinine improved at 3.74 compared to 4.38.  Patient still acidotic with anion gap of 16.  This likely is related to his AMAYA.  Potassium is lower at 3.3  and will order correction.     urine culture pending         Vitals:     07/12/20 0715   BP: 159/59   Pulse: 82   Resp: 18   Temp: 98.5 °F (36.9 °C)   SpO2: 97%                   Non toxic appearing  States doing better.  No distress  Normal respiratory effort    allopurinol 100 mg Oral Daily   amLODIPine 10 mg Oral Daily   aspirin 81 mg Oral Daily   cefTRIAXone 2 g Intravenous Q24H   cholestyramine 1 packet Oral Daily   cloNIDine 0.2 mg Oral Q8H   enoxaparin 30 mg Subcutaneous Q24H   famotidine 20 mg Oral Daily   metoprolol succinate XL 25 mg Oral Daily   potassium chloride 30 mEq Oral Once   saccharomyces boulardii 250 mg Oral BID   sodium chloride 10 mL Intravenous Q12H           Jamie Pineda MD  07/12/20  15:33

## 2020-07-12 NOTE — PROGRESS NOTES
Nephrology ([facility]) Progress Note      Patient:  Ricardo Hugo  YOB: 1950  Date of Service: 7/12/2020  MRN: [id number]   Acct: [id number]   Primary Care Physician: Joe Velasco MD  Advance Directive:   Code Status and Medical Interventions:   Ordered at: 07/11/20 1455     Level Of Support Discussed With:    Patient    Health Care Surrogate     Code Status:    CPR     Medical Interventions (Level of Support Prior to Arrest):    Full     Admit Date: 7/11/2020       Hospital Day: 1  Referring Provider: Jamie Pineda*      Patient Seen, Chart, Consults, Notes, Labs, Radiology studies reviewed.    Chief complaint: Abnormal labs.    Subjective:  Ricardo Hugo is a 70 y.o. male  whom we were consulted for acute kidney injury.  Patient presented to the emergency room with chronic diarrhea accompanied by weight loss.  Patient underwent colonoscopy last month and was placed on steroid.,  Weakness, fever and diarrhea.  He was found to have a creatinine of 4.0 mg.  Hospital course remarkable for IV fluid administration and has improvement of renal function.  Patient also has metabolic acidemia indicates lower GI losses.    This morning he is feeling better    Allergies:  Lortab [hydrocodone-acetaminophen]    Home Meds:  Facility-Administered Medications Prior to Admission   Medication Dose Route Frequency Provider Last Rate Last Dose   • cyanocobalamin injection 1,000 mcg  1,000 mcg Intramuscular Q28 Days Joe Velasco MD   1,000 mcg at 07/06/20 1552     Medications Prior to Admission   Medication Sig Dispense Refill Last Dose   • albuterol (PROVENTIL HFA;VENTOLIN HFA) 108 (90 BASE) MCG/ACT inhaler Every 6 (Six) Hours.   7/1/2020 at Unknown time   • amLODIPine (NORVASC) 10 MG tablet Take 10 mg by mouth daily   7/2/2020 at Unknown time   • aspirin (ASPIR) 81 MG EC tablet Take 81 mg by mouth Daily.   7/1/2020 at Unknown time   • azelastine (ASTELIN) 0.1 % nasal  spray 2 sprays into each nostril 2 (Two) Times a Day. Use in each nostril as directed   Past Week at Unknown time   • CloNIDine (CATAPRES) 0.2 MG tablet Take 0.2 mg by mouth 3 times daily   7/2/2020 at Unknown time   • desoximetasone (TOPICORT) 0.25 % cream APPLY EXTERNALLY TO THE AFFECTED AREA TWICE DAILY AS DIRECTED 60 g 0 Past Week at Unknown time   • diphenoxylate-atropine (LOMOTIL) 2.5-0.025 MG per tablet TAKE 1 TABLET BY MOUTH FOUR TIMES DAILY AS NEEDED FOR DIARRHEA 30 tablet 5 6/28/2020   • fexofenadine (ALLEGRA) 180 MG tablet Take 180 mg by mouth Daily.   7/2/2020 at Unknown time   • fluticasone (FLONASE) 50 MCG/ACT nasal spray USE 1 SPRAY IN EACH NOSTRIL TWICE A DAY 48 g 5 Past Week at Unknown time   • magnesium chloride ER (MAG-DELAY) 535 (64 MG) MG CR tablet Take 64 mg by mouth daily   7/2/2020 at Unknown time   • methylPREDNISolone (MEDROL, JYOTSNA,) 4 MG tablet Take as directed on package instructions. 21 each 0    • metoprolol succinate XL (TOPROL-XL) 25 MG 24 hr tablet Take 1 tablet by mouth Daily. 30 tablet 5 7/2/2020 at Unknown time   • Multiple Vitamins-Minerals (PRESERVISION/LUTEIN PO) Take  by mouth.   7/2/2020 at Unknown time   • Omeprazole Magnesium 20.6 (20 BASE) MG capsule delayed-release Take by mouth daily   7/2/2020 at Unknown time   • sildenafil (REVATIO) 20 MG tablet TAKE 2 3 TABLETS BY MOUTH 30 MINS PRIOR TO INTERCOURSE   Taking   • valsartan (DIOVAN) 160 MG tablet TAKE 1 TABLET DAILY 90 tablet 3 7/2/2020 at Unknown time       Medicines:  Current Facility-Administered Medications   Medication Dose Route Frequency Provider Last Rate Last Dose   • acetaminophen (TYLENOL) tablet 650 mg  650 mg Oral Q4H PRN Maria L Zambrano APRN       • allopurinol (ZYLOPRIM) tablet 100 mg  100 mg Oral Daily Bolivar Rueda MD   100 mg at 07/12/20 0930   • amLODIPine (NORVASC) tablet 10 mg  10 mg Oral Daily Maria L Zambrano APRN   10 mg at 07/12/20 1233   • aspirin EC tablet 81 mg  81 mg Oral  Daily Woeltz, Maria L K, APRN   81 mg at 07/12/20 0930   • cefTRIAXone (ROCEPHIN) 2 g/100 mL 0.9% NS VTB (TOSIN)  2 g Intravenous Q24H Woeltz, Maria L K, APRN       • cholestyramine (QUESTRAN) packet 1 packet  1 packet Oral Daily Woeltz, Maira L K, APRN   1 packet at 07/12/20 1233   • cloNIDine (CATAPRES) tablet 0.2 mg  0.2 mg Oral Q8H Woeltz, Maria L K, APRN       • enoxaparin (LOVENOX) syringe 30 mg  30 mg Subcutaneous Q24H Woeltz, Maria L K, APRN   30 mg at 07/11/20 1755   • famotidine (PEPCID) tablet 20 mg  20 mg Oral Daily Jamie Pineda MD   20 mg at 07/12/20 1233   • metoprolol succinate XL (TOPROL-XL) 24 hr tablet 25 mg  25 mg Oral Daily Woeltz, Maria L K, APRN   25 mg at 07/12/20 0931   • ondansetron (ZOFRAN) injection 4 mg  4 mg Intravenous Q6H PRN Woeltz, Maria L K, APRN       • saccharomyces boulardii (FLORASTOR) capsule 250 mg  250 mg Oral BID Woeltz, Maria L K, APRN   250 mg at 07/12/20 0930   • Sodium Bicarbonate 8.4 % 100 mEq in Sodium chloride 0.45% 1000 mL infusion  125 mL/hr Intravenous Continuous Bolivar Rueda  mL/hr at 07/12/20 0620 125 mL/hr at 07/12/20 0620   • sodium chloride 0.9 % flush 10 mL  10 mL Intravenous Q12H Woeltz, Maria L K, APRN       • sodium chloride 0.9 % flush 10 mL  10 mL Intravenous PRN Woeltz, Maria L K, APRN           Past Medical History:  Past Medical History:   Diagnosis Date   • Acid reflux    • Allergic rhinitis    • Chronic mucoid otitis media    • Chronic rhinitis    • Chronic sinusitis    • Coronary artery disease     HEART BYPASS 2004   • Eustachian tube dysfunction    • Heart disease    • Hypertension    • Mixed hearing loss of left ear    • Perianal abscess    • Sensorineural hearing loss    • Tinnitus        Past Surgical History:  Past Surgical History:   Procedure Laterality Date   • COLONOSCOPY N/A 7/2/2020    Procedure: COLONOSCOPY WITH ANESTHESIA;  Surgeon: Adrien Brewster MD;  Location: [facility] ENDOSCOPY;  Service:  "Gastroenterology;  Laterality: N/A;  pre op: diarrhea  post op: polyps  PCP: Joe Velasco MD   • CORONARY ARTERY BYPASS GRAFT     • INCISION AND DRAINAGE PERIRECTAL ABSCESS N/A 3/3/2017    Procedure: INCISION AND DRAINAGE OF JEET ANAL ABSCESS;  Surgeon: Lynette Smith MD;  Location:  [facility] OR;  Service:    • MYRINGOTOMY W/ TUBES Left 04/17/2017    06/10/2016   • TONSILLECTOMY     • TOTAL HIP ARTHROPLASTY         Family History  Family History   Problem Relation Age of Onset   • No Known Problems Mother    • No Known Problems Father    • Colon cancer Neg Hx    • Colon polyps Neg Hx        Social History  Social History     Socioeconomic History   • Marital status:      Spouse name: Not on file   • Number of children: Not on file   • Years of education: Not on file   • Highest education level: Not on file   Tobacco Use   • Smoking status: Former Smoker   • Smokeless tobacco: Never Used   • Tobacco comment: quit 2010   Substance and Sexual Activity   • Alcohol use: Yes     Alcohol/week: 14.0 standard drinks     Types: 14 Cans of beer per week     Comment: 2 beers daily   • Drug use: No   • Sexual activity: Defer         Review of Systems:  History obtained from chart review and the patient  General ROS: No fever or chills  Respiratory ROS: No cough, shortness of breath, wheezing  Cardiovascular ROS: no chest pain or dyspnea on exertion  Gastrointestinal ROS: positive for - diarrhea  Genito-Urinary ROS: No dysuria or hematuria  14 point ROS reviewed with the patient and negative except as noted above and in the HPI unless unable to obtain.    Objective:  /62 (BP Location: Right arm, Patient Position: Lying)   Pulse 73   Temp 98.6 °F (37 °C) (Oral)   Resp 16   Ht 182.9 cm (72\")   Wt 77.1 kg (170 lb)   SpO2 99%   BMI 23.06 kg/m²     Intake/Output Summary (Last 24 hours) at 7/12/2020 1417  Last data filed at 7/12/2020 1249  Gross per 24 hour   Intake 2943 ml   Output 3700 ml   Net -757 ml "     General: awake/alert   HEENT: Normocephalic atraumatic head  Neck: Supple with no JVD or carotid bruits.  Chest:  clear to auscultation bilaterally without respiratory distress  CVS: regular rate and rhythm  Abdominal: soft, nontender, normal bowel sounds  Extremities: no cyanosis or edema  Skin: warm and dry without rash      Labs:  Results from last 7 days   Lab Units 07/12/20  0705 07/11/20  1114   WBC 10*3/mm3 17.00* 19.49*   HEMOGLOBIN g/dL 11.0* 11.1*   HEMATOCRIT % 31.3* 33.7*   PLATELETS 10*3/mm3 260 291         Results from last 7 days   Lab Units 07/12/20  0705 07/11/20  1114   SODIUM mmol/L 135* 129*   POTASSIUM mmol/L 3.3* 4.2   CHLORIDE mmol/L 101 95*   CO2 mmol/L 18.0* 18.0*   BUN mg/dL 78* 94*   CREATININE mg/dL 3.74* 4.38*   CALCIUM mg/dL 8.3* 8.5*   BILIRUBIN mg/dL 0.5 0.6   ALK PHOS U/L 235* 228*   ALT (SGPT) U/L 41 48*   AST (SGOT) U/L 26 30   GLUCOSE mg/dL 100* 179*           Radiology:   Imaging Results (Last 72 Hours)     Procedure Component Value Units Date/Time    US Renal Bilateral [[id number]] Collected:  07/11/20 1755     Updated:  07/11/20 1800    Narrative:       EXAMINATION:  US RENAL BILATERAL-  7/11/2020 5:46 PM CDT     HISTORY: Acute renal insufficiency.      COMPARISON: No comparison study.     TECHNIQUE: Grayscale and color flow imaging were performed.     FINDINGS: The urinary bladder is decompressed. Bladder wall thickening  is likely an artifact of underdistention. The visualized abdominal aorta  and inferior vena cava are normal in caliber. The right kidney measures  13.4 cm and the left kidney measures 13.6 cm. There is a 1.4 x 1.1 x 1.6  cm cyst in the upper to midpole region right kidney. The cortical  thickness and echogenicity are normal. There is no hydronephrosis.       Impression:       1. The renal size, cortical thickness and echogenicity are normal. No  hydronephrosis.  2. Small renal cyst on the right.  3. Bladder wall thickening is likely an artifact of  underdistention.  This report was finalized on 07/11/2020 17:57 by Dr. Mikael Gallegos MD.    CT Abdomen Pelvis Without Contrast [[id number]] Collected:  07/11/20 1258     Updated:  07/11/20 1307    Narrative:       EXAMINATION:   CT ABDOMEN PELVIS WO CONTRAST-  7/11/2020 12:58 PM CDT     HISTORY: CT ABDOMEN PELVIS WO CONTRAST- 7/11/2020 12:33 PM CDT     HISTORY: Flank pain, recurrent stone disease suspected      COMPARISON: 05/27/2020      DOSE LENGTH PRODUCT: 235 mGy cm. Automated exposure control was also  utilized to decrease patient radiation dose.     TECHNIQUE: Noncontrast enhanced images of the abdomen and pelvis  obtained without oral contrast. Multiplanar reformatted images were  provided for review.      FINDINGS:   The lung bases and base of the heart are unremarkable.      LIVER: No focal liver lesion.      BILIARY SYSTEM: The gallbladder is unremarkable. No intrahepatic or  extrahepatic ductal dilatation.      PANCREAS: No focal pancreatic lesion.      SPLEEN: Unremarkable.      KIDNEYS AND ADRENALS: Adrenal glands are visualized. The renal contours  are normal in size shape and position. Vascular calcifications present  in the left renal hilum. Stranding around the right and left kidney is  noted..  The ureters are decompressed and normal in appearance.     RETROPERITONEUM: No mass, lymphadenopathy or hemorrhage.      GI TRACT: No evidence of obstruction or bowel wall thickening.  Diverticulosis is present..     OTHER: There is no mesenteric mass, lymphadenopathy or fluid collection.  The osseous structures and soft tissues demonstrate no worrisome  lesions. Right hip prosthesis is present. Avascular necrosis the left  femoral head with associated collapse is also noted. Extensive vascular  calcifications present in the abdominal aorta and iliac arteries.      PELVIS: A right inguinal hernia is present. This contains small bowel.  There is no associated obstruction at this time.. The urinary bladder  is  normal in appearance.       Impression:       1. Right inguinal hernia containing small bowel. The bowel is not  obstructed at this time.  2. Extensive vascular calcifications present in the abdominal aorta and  iliac arteries.  3. 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 Diverticulosis.         This report was finalized on 07/11/2020 13:04 by Dr. Donald Rhodes MD.          Culture:  No components found for: WOUNDCUL, 3  No components found for: CSFCUL, 3  No components found for: BC, 3  No components found for: URINECUL, 3      Assessment   1.  Acute kidney injury/volume depletion versus ATN..  2.  Urinary tract infection.  3.  Metabolic acidemia.  4.  Hypokalemia.  5.  Chronic diarrhea.  6.  E. coli bacteremia./Sepsis.  7.  Significant weight loss.    Plan:  1.  Continue IV fluid.  2.  Potassium supplement.  3.  Renal ultrasound looks normal.  4.  IV antibiotics.      Giuliano Saldana MD  7/12/2020  14:17

## 2020-07-12 NOTE — PLAN OF CARE
Problem: Patient Care Overview  Goal: Plan of Care Review  Outcome: Ongoing (interventions implemented as appropriate)  Flowsheets  Taken 7/12/2020 0329  Progress: no change  Taken 7/11/2020 2058  Plan of Care Reviewed With: patient  Note:   Pt has denied pain and nausea this shift. Blood cultures came back positive . Provider notified. Voiding without difficulty via condom cath. Up with assist x 1. Up in chair this shift. Tolerating diet. No signs of distress at this time. Will cont to monitor.

## 2020-07-12 NOTE — NURSING NOTE
Microbiology reports blood cultures positive for E. Coli in 2 anaerobic bottles. Pt currently receiving IV abx. Will notify primary RNSerenity.

## 2020-07-12 NOTE — THERAPY EVALUATION
Acute Care - Occupational Therapy Initial Evaluation  [facility]     Patient Name: Ricardo Hugo  : 1950  MRN: [id number]  Today's Date: 2020  Onset of Illness/Injury or Date of Surgery: 20  Date of Referral to OT: 20  Referring Physician: Dr. Pineda    Admit Date: 2020       ICD-10-CM ICD-9-CM   1. AMAYA (acute kidney injury) (CMS/[facility]) N17.9 584.9   2. Hyponatremia E87.1 276.1   3. Diarrhea, unspecified type R19.7 787.91   4. Acute UTI (urinary tract infection) N39.0 599.0   5. Impaired mobility and ADLs Z74.09 V49.89    Z78.9      Patient Active Problem List   Diagnosis   • Eustachian tube dysfunction   • Chronic rhinitis   • Asymmetrical sensorineural hearing loss   • History of adenomatous polyp of colon   • Atherosclerosis of native artery of both lower extremities with intermittent claudication (CMS/[facility])   • Accelerated hypertension   • Diarrhea of infectious origin   • Anxiety disorder   • Bilateral leg edema   • Ischemic colitis (CMS/[facility])   • Diarrhea   • AMAYA (acute kidney injury) (CMS/[facility])   • Avascular necrosis of femoral head, left (CMS/[facility])   • Hyponatremia   • Acute cystitis   • Metabolic acidosis   • Hyperglycemia   • Weight loss   • E coli bacteremia     Past Medical History:   Diagnosis Date   • Acid reflux    • Allergic rhinitis    • Chronic mucoid otitis media    • Chronic rhinitis    • Chronic sinusitis    • Coronary artery disease     HEART BYPASS    • Eustachian tube dysfunction    • Heart disease    • Hypertension    • Mixed hearing loss of left ear    • Perianal abscess    • Sensorineural hearing loss    • Tinnitus      Past Surgical History:   Procedure Laterality Date   • COLONOSCOPY N/A 2020    Procedure: COLONOSCOPY WITH ANESTHESIA;  Surgeon: Adrien Brewster MD;  Location: [facility] ENDOSCOPY;  Service: Gastroenterology;  Laterality: N/A;  pre op: diarrhea  post op: polyps  PCP: Joe Velasco MD   • CORONARY ARTERY BYPASS GRAFT     • INCISION  AND DRAINAGE PERIRECTAL ABSCESS N/A 3/3/2017    Procedure: INCISION AND DRAINAGE OF JEET ANAL ABSCESS;  Surgeon: Lynette Smith MD;  Location: [facility] OR;  Service:    • MYRINGOTOMY W/ TUBES Left 04/17/2017    06/10/2016   • TONSILLECTOMY     • TOTAL HIP ARTHROPLASTY            OT ASSESSMENT FLOWSHEET (last 12 hours)      Occupational Therapy Evaluation     Row Name 07/12/20 0955                   OT Evaluation Time/Intention    Subjective Information  no complaints  -MM        Document Type  evaluation;other (see comments) see MAR  -MM        Mode of Treatment  occupational therapy  -MM           General Information    Patient Profile Reviewed?  yes  -MM        Onset of Illness/Injury or Date of Surgery  07/11/20  -MM        Referring Physician  Dr. Pineda  -MM        Patient Observations  alert;cooperative;agree to therapy  -MM        Patient/Family Observations  no family present  -MM        General Observations of Patient  fowlers, no apparent distress, IV, dsouza  -MM        Prior Level of Function  independent:;all household mobility;community mobility;transfer;bed mobility;ADL's;feeding;grooming;dressing;bathing  -MM        Equipment Currently Used at Home  walker, rolling;shower chair;commode, bedside;cane, straight does not use, has from previous sx  -MM        Pertinent History of Current Functional Problem  Admitted with generalized weakness and diarrhea. dx: AMAYA, UTI, hyponatremia, diarrhea, avascular necrosis L femoral head, respiratory acidosis with metabolic encephalopathy, hyperglycemia, hyperuricemia.  -MM        Existing Precautions/Restrictions  fall  -MM        Equipment Issued to Patient  gait belt  -MM        Risks Reviewed  patient:;LOB;nausea/vomiting;dizziness;increased discomfort;change in vital signs;increased drainage;lines disloged  -MM        Benefits Reviewed  patient:;improve function;increase strength;increase balance;increase independence;decrease pain;decrease risk of  DVT;increase knowledge;improve skin integrity  -MM        Barriers to Rehab  none identified  -MM           Relationship/Environment    Lives With  alone  -MM           Resource/Environmental Concerns    Current Living Arrangements  home/apartment/condo  -MM           Stairs Within Home, Primary    Number of Stairs, Within Home, Primary  two  -MM        Stair Railings, Within Home, Primary  none  -MM           Cognitive Assessment/Interventions    Additional Documentation  Cognitive Assessment/Intervention (Group)  -MM           Cognitive Assessment/Intervention- PT/OT    Affect/Mental Status (Cognitive)  WNL  -MM        Orientation Status (Cognition)  oriented x 4  -MM        Follows Commands (Cognition)  WNL  -MM        Cognitive Function (Cognitive)  WNL  -MM        Personal Safety Interventions  fall prevention program maintained;gait belt;muscle strengthening facilitated;nonskid shoes/slippers when out of bed;supervised activity  -MM           Bed Mobility Assessment/Treatment    Bed Mobility Assessment/Treatment  supine-sit  -MM        Supine-Sit [place] (Bed Mobility)  supervision  -MM        Assistive Device (Bed Mobility)  head of bed elevated;bed rails  -MM           Functional Mobility    Functional Mobility- Ind. Level  contact guard assist  -MM        Functional Mobility- Comment  to RN station and back to room  -MM           Transfer Assessment/Treatment    Transfer Assessment/Treatment  sit-stand transfer;stand-sit transfer  -MM           Sit-Stand Transfer    Sit-Stand [place] (Transfers)  contact guard;supervision  -MM           Stand-Sit Transfer    Stand-Sit [place] (Transfers)  contact guard;supervision  -MM           ADL Assessment/Intervention    BADL Assessment/Intervention  lower body dressing  -MM           Lower Body Dressing Assessment/Training    Lower Body Dressing [place] Level  don;doff;socks;maximum assist (25% patient effort);verbal cues  -MM        Lower Body  "Dressing Position  edge of bed sitting  -MM        Comment (Lower Body Dressing)  \"it takes too much out of me\"  -MM           BADL Safety/Performance    Impairments, BADL Safety/Performance  endurance/activity tolerance  -MM           General ROM    GENERAL ROM COMMENTS  AROM WFL grossly all extremities  -MM           MMT (Manual Muscle Testing)    General MMT Comments  Grossly 4/5  -MM           Motor Assessment/Interventions    Additional Documentation  Balance (Group)  -MM           Balance    Balance  static sitting balance;static standing balance;dynamic sitting balance;dynamic standing balance  -MM           Static Sitting Balance    Level of Cross (Unsupported Sitting, Static Balance)  independent  -MM           Dynamic Sitting Balance    Level of [place], Reaches Outside Midline (Sitting, Dynamic Balance)  supervision  -MM           Static Standing Balance    Level of [place] (Supported Standing, Static Balance)  independent  -MM           Dynamic Standing Balance    Level of [place], Reaches Outside Midline (Standing, Dynamic Balance)  contact guard assist  -MM           Sensory Assessment/Intervention    Sensory General Assessment  no sensation deficits identified  -MM           Positioning and Restraints    Pre-Treatment Position  in bed  -MM        Post Treatment Position  bed  -MM        In Bed  sitting EOB;call light within reach;encouraged to call for assist;side rails up x2  -MM           Pain Assessment    Additional Documentation  Pain Scale: Numbers Pre/Post-Treatment (Group)  -MM           Pain Scale: Numbers Pre/Post-Treatment    Pain Scale: Numbers, Pretreatment  0/10 - no pain  -MM        Pain Scale: Numbers, Post-Treatment  0/10 - no pain  -MM           Plan of Care Review    Plan of Care Reviewed With  patient  -MM        Progress  no change  -MM        Outcome Summary  OT eval completed. Pt has no complaints. Pt supervision to CGA for all functional mobility. Pt was " "max A to toby/doff socks stating \"it takes too much out of me.\" Pt is noted to have poor endurance with all tasks. Pt takes rest breaks as needed. Pt sits EOB without extremity support independently. Skilled OT recommended to address adls, functional mobility and endurance. Recommended d/c home with assist and HH.  -MM           Clinical Impression (OT)    Date of Referral to OT  07/11/20  -MM        OT Diagnosis  impaired mobility and adls  -MM        Prognosis (OT Eval)  good  -MM        Functional Level at Time of Evaluation (OT Eval)  good  -MM        Patient/Family Goals Statement (OT Eval)  return home  -MM        Criteria for Skilled Therapeutic Interventions Met (OT Eval)  yes;treatment indicated  -MM        Rehab Potential (OT Eval)  good, to achieve stated therapy goals  -MM        Therapy Frequency (OT Eval)  3 times/wk  -MM        Predicted Duration of Therapy Intervention (Therapy Eval)  until d/c  -MM        Care Plan Review (OT)  evaluation/treatment results reviewed;care plan/treatment goals reviewed;risks/benefits reviewed;current/potential barriers reviewed;patient/other agree to care plan  -MM        Anticipated Discharge Disposition (OT)  home with assist;home with home health  -MM           Planned OT Interventions    Planned Therapy Interventions (OT Eval)  activity tolerance training;adaptive equipment training;BADL retraining;functional balance retraining;occupation/activity based interventions;patient/caregiver education/training;ROM/therapeutic exercise;strengthening exercise;transfer/mobility retraining  -MM           OT Goals    Bathing Goal Selection (OT)  bathing, OT goal 1  -MM        Dressing Goal Selection (OT)  dressing, OT goal 1  -MM        Toileting Goal Selection (OT)  toileting, OT goal 1  -MM           Bathing Goal 1 (OT)    Activity/Assistive Device (Bathing Goal 1, OT)  bathing skills, all  -MM        [place] Level/Cues Needed (Bathing Goal 1, OT)  independent  -MM     "    Time Frame (Bathing Goal 1, OT)  10 days  -MM        Progress/Outcomes (Bathing Goal 1, OT)  goal ongoing  -MM           Dressing Goal 1 (OT)    Activity/Assistive Device (Dressing Goal 1, OT)  dressing skills, all  -MM        [place]/Cues Needed (Dressing Goal 1, OT)  independent  -MM        Time Frame (Dressing Goal 1, OT)  10 days  -MM        Progress/Outcome (Dressing Goal 1, OT)  goal ongoing  -MM           Toileting Goal 1 (OT)    Activity/Device (Toileting Goal 1, OT)  toileting skills, all  -MM        [place] Level/Cues Needed (Toileting Goal 1, OT)  independent  -MM        Time Frame (Toileting Goal 1, OT)  10 days  -MM        Progress/Outcome (Toileting Goal 1, OT)  goal ongoing  -MM           Living Environment    Home Accessibility  other (see comments);stairs within home walk in shower and tub  -MM          User Key  (r) = Recorded By, (t) = Taken By, (c) = Cosigned By    Initials Name Effective Dates    Gaurang Childers, OTR/L 07/05/20 -          Occupational Therapy Education                 Title: PT OT SLP Therapies (In Progress)     Topic: Occupational Therapy (In Progress)     Point: ADL training (Done)     Description:   Instruct learner(s) on proper safety adaptation and remediation techniques during self care or transfers.   Instruct in proper use of assistive devices.              Learning Progress Summary           Patient Acceptance, E, VU by FOSTER at 7/12/2020 [id number]    Comment:  OT role, benefits, POC, d/c planning, home safety, endurance techniques                   Point: Home exercise program (Not Started)     Description:   Instruct learner(s) on appropriate technique for monitoring, assisting and/or progressing therapeutic exercises/activities.              Learner Progress:   Not documented in this visit.          Point: Precautions (Done)     Description:   Instruct learner(s) on prescribed precautions during self-care and functional transfers.              Learning  "Progress Summary           Patient Acceptance, E, VU by  at 7/12/2020 0332    Comment:  OT role, benefits, POC, d/c planning, home safety, endurance techniques                   Point: Body mechanics (Done)     Description:   Instruct learner(s) on proper positioning and spine alignment during self-care, functional mobility activities and/or exercises.              Learning Progress Summary           Patient Acceptance, E, VU by  at 7/12/2020 3873    Comment:  OT role, benefits, POC, d/c planning, home safety, endurance techniques                               User Key     Initials Effective Dates Name Provider Type Discipline     07/05/20 -  Gaurang Emmanuel, OTR/L Occupational Therapist OT                  OT Recommendation and Plan  Outcome Summary/Treatment Plan (OT)  Anticipated Discharge Disposition (OT): home with assist, home with home health  Planned Therapy Interventions (OT Eval): activity tolerance training, adaptive equipment training, BADL retraining, functional balance retraining, occupation/activity based interventions, patient/caregiver education/training, ROM/therapeutic exercise, strengthening exercise, transfer/mobility retraining  Therapy Frequency (OT Eval): 3 times/wk  Plan of Care Review  Plan of Care Reviewed With: patient  Plan of Care Reviewed With: patient  Outcome Summary: OT eval completed. Pt has no complaints. Pt supervision to CGA for all functional mobility. Pt was max A to toby/doff socks stating \"it takes too much out of me.\" Pt is noted to have poor endurance with all tasks. Pt takes rest breaks as needed. Pt sits EOB without extremity support independently. Skilled OT recommended to address adls, functional mobility and endurance. Recommended d/c home with assist and HH.    Outcome Measures     Row Name 07/12/20 1400             How much help from another is currently needed...    Putting on and taking off regular lower body clothing?  2  -MM      Bathing (including " washing, rinsing, and drying)  2  -MM      Toileting (which includes using toilet bed pan or urinal)  3  -MM      Putting on and taking off regular upper body clothing  4  -MM      Taking care of personal grooming (such as brushing teeth)  4  -MM      Eating meals  4  -MM      AM-PAC 6 Clicks Score (OT)  19  -MM         Functional Assessment    Outcome Measure Options  AM-PAC 6 Clicks Daily Activity (OT)  -MM        User Key  (r) = Recorded By, (t) = Taken By, (c) = Cosigned By    Initials Name Provider Type    Gaurang Childers, OTR/L Occupational Therapist          Time Calculation:   Time Calculation- OT     Row Name 07/12/20 0955             Time Calculation- OT    OT Start Time  0955  -MM      OT Stop Time  1048  -MM      OT Time Calculation (min)  53 min  -MM      OT Received On  07/12/20  -MM      OT Goal Re-Cert Due Date  07/22/20  -MM        User Key  (r) = Recorded By, (t) = Taken By, (c) = Cosigned By    Initials Name Provider Type    Gaurang Childers, OTR/L Occupational Therapist        Therapy Charges for Today     Code Description Service Date Service Provider Modifiers Qty    [id number] HC OT EVAL LOW COMPLEXITY 4 7/12/2020 Gaurang Emmanuel, OTR/L GO 1               BEATRIS Stoddard/NIK  7/12/2020

## 2020-07-12 NOTE — CONSULTS
Nephrology ([facility]) Consult Note      Patient:  Ricardo Hugo  YOB: 1950  Date of Service: 7/11/2020  MRN: [id number]   Acct: [id number]   Primary Care Physician: Joe Velasco MD  Advance Directive:   Code Status and Medical Interventions:   Ordered at: 07/11/20 1455     Level Of Support Discussed With:    Patient    Health Care Surrogate     Code Status:    CPR     Medical Interventions (Level of Support Prior to Arrest):    Full     Admit Date: 7/11/2020       Hospital Day: 0  Referring Provider: Jamie Pineda*      Patient Seen, Chart, Consults, Notes, Labs, Radiology studies reviewed.        Subjective:  Ricardo Hugo is a 70 y.o. male  whom we were consulted for acute kidney injury.  Patient has a history of chronic diarrhea which is now accompanied with weight loss.  In June 2020 he underwent a colonoscopy with Dr. Brewster and he was recently placed on oral steroids.  Unfortunately he continues to have severe recurrent diarrhea with abdominal cramping.  He reported one episode of fever 2 days ago with a temperature of 101.  He denies any change to his urine habit and had no nausea or vomiting but admitted decreased oral intake.  Over the last 2 days he started to feel very weak and he reported to the hospital.  On admission his serum sodium was 129, his serum creatinine was 4.3 and his glucose was 179.  Patient was diagnosed with acute kidney injury and was placed on intravenous fluid.  He had a history of benign hypertension and is managed with antihypertensive medications.  He denies any change to his urine habits.  He admitted rare use of NSAIDs.  He has no history of kidney stones.  He is not known to have diabetes.    Allergies:  Lortab [hydrocodone-acetaminophen]    Home Meds:  Facility-Administered Medications Prior to Admission   Medication Dose Route Frequency Provider Last Rate Last Dose   • cyanocobalamin injection 1,000 mcg  1,000 mcg  Intramuscular Q28 Days Joe Velasco MD   1,000 mcg at 07/06/20 1552     Medications Prior to Admission   Medication Sig Dispense Refill Last Dose   • albuterol (PROVENTIL HFA;VENTOLIN HFA) 108 (90 BASE) MCG/ACT inhaler Every 6 (Six) Hours.   7/1/2020 at Unknown time   • amLODIPine (NORVASC) 10 MG tablet Take 10 mg by mouth daily   7/2/2020 at Unknown time   • aspirin (ASPIR) 81 MG EC tablet Take 81 mg by mouth Daily.   7/1/2020 at Unknown time   • azelastine (ASTELIN) 0.1 % nasal spray 2 sprays into each nostril 2 (Two) Times a Day. Use in each nostril as directed   Past Week at Unknown time   • CloNIDine (CATAPRES) 0.2 MG tablet Take 0.2 mg by mouth 3 times daily   7/2/2020 at Unknown time   • desoximetasone (TOPICORT) 0.25 % cream APPLY EXTERNALLY TO THE AFFECTED AREA TWICE DAILY AS DIRECTED 60 g 0 Past Week at Unknown time   • diphenoxylate-atropine (LOMOTIL) 2.5-0.025 MG per tablet TAKE 1 TABLET BY MOUTH FOUR TIMES DAILY AS NEEDED FOR DIARRHEA 30 tablet 5 6/28/2020   • fexofenadine (ALLEGRA) 180 MG tablet Take 180 mg by mouth Daily.   7/2/2020 at Unknown time   • fluticasone (FLONASE) 50 MCG/ACT nasal spray USE 1 SPRAY IN EACH NOSTRIL TWICE A DAY 48 g 5 Past Week at Unknown time   • magnesium chloride ER (MAG-DELAY) 535 (64 MG) MG CR tablet Take 64 mg by mouth daily   7/2/2020 at Unknown time   • methylPREDNISolone (MEDROL, JYOTSNA,) 4 MG tablet Take as directed on package instructions. 21 each 0    • metoprolol succinate XL (TOPROL-XL) 25 MG 24 hr tablet Take 1 tablet by mouth Daily. 30 tablet 5 7/2/2020 at Unknown time   • Multiple Vitamins-Minerals (PRESERVISION/LUTEIN PO) Take  by mouth.   7/2/2020 at Unknown time   • Omeprazole Magnesium 20.6 (20 BASE) MG capsule delayed-release Take by mouth daily   7/2/2020 at Unknown time   • sildenafil (REVATIO) 20 MG tablet TAKE 2 3 TABLETS BY MOUTH 30 MINS PRIOR TO INTERCOURSE   Taking   • valsartan (DIOVAN) 160 MG tablet TAKE 1 TABLET DAILY 90 tablet 3 7/2/2020 at  Unknown time       Medicines:  Current Facility-Administered Medications   Medication Dose Route Frequency Provider Last Rate Last Dose   • acetaminophen (TYLENOL) tablet 650 mg  650 mg Oral Q4H PRN Woaleks, Maria L K, APRN       • aspirin EC tablet 81 mg  81 mg Oral Daily Woeltz, Maria L K, APRN       • cefTRIAXone (ROCEPHIN) 1 g/100 mL 0.9% NS (MBP)  1 g Intravenous Q24H Woeltz, Maria L K, APRN   1 g at 07/11/20 1755   • enoxaparin (LOVENOX) syringe 30 mg  30 mg Subcutaneous Q24H Woeltz, Maria L K, APRN   30 mg at 07/11/20 1755   • metoprolol succinate XL (TOPROL-XL) 24 hr tablet 25 mg  25 mg Oral Daily Woeltosiel, Maria L K, APRN       • ondansetron (ZOFRAN) injection 4 mg  4 mg Intravenous Q6H PRN Woaleks, Maria L K, APRN       • [START ON 7/12/2020] pantoprazole (PROTONIX) EC tablet 40 mg  40 mg Oral QAM Woaleks, Maria L K, APRN       • saccharomyces boulardii (FLORASTOR) capsule 250 mg  250 mg Oral BID Woeltz, Maria L K, APRN       • Sodium bicarbonate 8.4% 75 mEq in sodium chloride 0.45% 1000 mL infusion  100 mL/hr Intravenous Continuous Bolivar Rueda MD       • sodium chloride 0.9 % flush 10 mL  10 mL Intravenous Q12H Woeltosiel, Maria L K, APRN       • sodium chloride 0.9 % flush 10 mL  10 mL Intravenous PRN Vanessa Zambranoricia K, APRN           Past Medical History:  Past Medical History:   Diagnosis Date   • Acid reflux    • Allergic rhinitis    • Chronic mucoid otitis media    • Chronic rhinitis    • Chronic sinusitis    • Coronary artery disease     HEART BYPASS 2004   • Eustachian tube dysfunction    • Heart disease    • Hypertension    • Mixed hearing loss of left ear    • Perianal abscess    • Sensorineural hearing loss    • Tinnitus        Past Surgical History:  Past Surgical History:   Procedure Laterality Date   • COLONOSCOPY N/A 7/2/2020    Procedure: COLONOSCOPY WITH ANESTHESIA;  Surgeon: Adrien Brewster MD;  Location: [facility] ENDOSCOPY;  Service: Gastroenterology;  Laterality: N/A;   "pre op: diarrhea  post op: polyps  PCP: Joe Velasco MD   • CORONARY ARTERY BYPASS GRAFT     • INCISION AND DRAINAGE PERIRECTAL ABSCESS N/A 3/3/2017    Procedure: INCISION AND DRAINAGE OF JEET ANAL ABSCESS;  Surgeon: Lynette Smith MD;  Location:  [facility] OR;  Service:    • MYRINGOTOMY W/ TUBES Left 04/17/2017    06/10/2016   • TONSILLECTOMY     • TOTAL HIP ARTHROPLASTY         Family History  Family History   Problem Relation Age of Onset   • No Known Problems Mother    • No Known Problems Father    • Colon cancer Neg Hx    • Colon polyps Neg Hx        Social History  Social History     Socioeconomic History   • Marital status:      Spouse name: Not on file   • Number of children: Not on file   • Years of education: Not on file   • Highest education level: Not on file   Tobacco Use   • Smoking status: Former Smoker   • Smokeless tobacco: Never Used   • Tobacco comment: quit 2010   Substance and Sexual Activity   • Alcohol use: Yes     Alcohol/week: 14.0 standard drinks     Types: 14 Cans of beer per week     Comment: 2 beers daily   • Drug use: No   • Sexual activity: Defer         Review of Systems:  History obtained from chart review and the patient  General ROS: No fever or chills  Respiratory ROS: No cough, shortness of breath, wheezing  Cardiovascular ROS: no chest pain or dyspnea on exertion  Gastrointestinal ROS: No abdominal pain or melena  Genito-Urinary ROS: No dysuria or hematuria  14 point ROS reviewed with the patient and negative except as noted above and in the HPI unless unable to obtain.    Objective:  /52 (BP Location: Right arm, Patient Position: Lying)   Pulse 79   Temp (!) 100.8 °F (38.2 °C) (Oral)   Resp 18   Ht 182.9 cm (72\")   Wt 77.1 kg (170 lb)   SpO2 99%   BMI 23.06 kg/m²     Intake/Output Summary (Last 24 hours) at 7/11/2020 2003  Last data filed at 7/11/2020 1803  Gross per 24 hour   Intake 2463 ml   Output 650 ml   Net 1813 ml     General: awake/alert    Head: " Atraumatic, normocephalic  Neck: No palpable masses no jugular venous distention  Chest:  clear to auscultation bilaterally without respiratory distress  CVS: regular rate and rhythm  Abdominal: soft, nontender, normal bowel sounds  Extremities: no cyanosis or edema  Skin: warm and dry without rash  Neuro: No focal motor deficits  Musculoskeletal: No obvious joint effusions    Labs:  Lab Results (last 72 hours)     Procedure Component Value Units Date/Time    Eosinophil Smear - Urine, Urine, Clean Catch [[id number]] Collected:  07/11/20 1756    Specimen:  Urine, Clean Catch Updated:  07/11/20 1919    Osmolality, Urine - Urine, Clean Catch [[id number]]  (Abnormal) Collected:  07/11/20 1756    Specimen:  Urine, Clean Catch Updated:  07/11/20 1849     Osmolality, Urine 184 mOsm/kg     Protein, Urine, Random - Urine, Clean Catch [[id number]] Collected:  07/11/20 1756    Specimen:  Urine, Clean Catch Updated:  07/11/20 1838     Total Protein, Urine 15.4 mg/dL     Narrative:       Reference intervals for random urine have not been established.  Clinical usage is dependent upon physician's interpretation in combination with other laboratory tests.       Urea Nitrogen, Urine - Urine, Clean Catch [[id number]] Collected:  07/11/20 1756    Specimen:  Urine, Clean Catch Updated:  07/11/20 1811    Creatinine, Urine, Random - Urine, Clean Catch [[id number]] Collected:  07/11/20 1756    Specimen:  Urine, Clean Catch Updated:  07/11/20 1811    T4, Free [[id number]]  (Normal) Collected:  07/11/20 1114    Specimen:  Blood Updated:  07/11/20 1740     Free T4 0.96 ng/dL     Narrative:       Results may be falsely increased if patient taking Biotin.      Uric Acid [[id number]]  (Abnormal) Collected:  07/11/20 1114    Specimen:  Blood Updated:  07/11/20 1720     Uric Acid 9.9 mg/dL     Magnesium [[id number]]  (Normal) Collected:  07/11/20 1114    Specimen:  Blood Updated:  07/11/20 1649     Magnesium 1.6 mg/dL     Phosphorus [[id number]]   (Abnormal) Collected:  07/11/20 1114    Specimen:  Blood Updated:  07/11/20 1649     Phosphorus 5.4 mg/dL     TSH [[id number]]  (Abnormal) Collected:  07/11/20 1114    Specimen:  Blood Updated:  07/11/20 1649     TSH 4.780 uIU/mL     Hemoglobin A1c [[id number]]  (Normal) Collected:  07/11/20 1114    Specimen:  Blood Updated:  07/11/20 1643     Hemoglobin A1C 5.50 %     Narrative:       Hemoglobin A1C Ranges:    Increased Risk for Diabetes  5.7% to 6.4%  Diabetes                     >= 6.5%  Diabetic Goal                < 7.0%    Blood Gas, Arterial [[id number]]  (Abnormal) Collected:  07/11/20 1628    Specimen:  Arterial Blood Updated:  07/11/20 1634     Site Right Radial     Héctor's Test Positive     pH, Arterial 7.433 pH units      pCO2, Arterial 26.9 mm Hg      Comment: 84 Value below reference range        pO2, Arterial 74.9 mm Hg      Comment: 84 Value below reference range        HCO3, Arterial 17.9 mmol/L      Comment: 84 Value below reference range        Base Excess, Arterial -5.2 mmol/L      Comment: 84 Value below reference range        O2 Saturation, Arterial 96.0 %      Temperature 37.0 C      Barometric Pressure for Blood Gas 748 mmHg      Modality Room Air     Ventilator Mode NA     Collected by [id number]     Comment: Meter: [id number]     :  [id number]        pCO2, Temperature Corrected 26.9 mm Hg      pH, Temp Corrected 7.433 pH Units      pO2, Temperature Corrected 74.9 mm Hg     Urinalysis, Microscopic Only - Urine, Clean Catch [[id number]]  (Abnormal) Collected:  07/11/20 1213    Specimen:  Urine, Clean Catch Updated:  07/11/20 1300     RBC, UA None Seen /HPF      WBC, UA Too Numerous to Count /HPF      Bacteria, UA 4+ /HPF      Squamous Epithelial Cells, UA None Seen /HPF      Methodology Automated Microscopy    Urine Culture - Urine, Urine, Clean Catch [[id number]] Collected:  07/11/20 1213    Specimen:  Urine, Clean Catch Updated:  07/11/20 1300    Sodium, Urine, Random - Urine, Clean  Catch [[id number]] Collected:  07/11/20 1213    Specimen:  Urine, Clean Catch Updated:  07/11/20 1258     Sodium, Urine 25 mmol/L     Narrative:       Reference intervals for random urine have not been established.  Clinical usage is dependent upon physician's interpretation in combination with other laboratory tests.       COVID-19, [facility]OTT IN-HOUSE,NP Swab (NO TRANSPORT MEDIA) 2 HR TAT - Swab, Nasopharynx [[id number]]  (Normal) Collected:  07/11/20 1215    Specimen:  Swab from Nasopharynx Updated:  07/11/20 1251     COVID19 Not Detected    Narrative:       Fact sheet for providers: https://www.fda.gov/media/939587/download     Fact sheet for patients: https://www.fda.gov/media/005188/download    Urinalysis With Culture If Indicated - Urine, Clean Catch [[id number]]  (Abnormal) Collected:  07/11/20 1213    Specimen:  Urine, Clean Catch Updated:  07/11/20 1247     Color, UA Yellow     Appearance, UA Turbid     pH, UA 5.5     Specific Gravity, UA <=1.005     Glucose, UA Negative     Ketones, UA Negative     Bilirubin, UA Negative     Blood, UA Moderate (2+)     Protein, UA 30 mg/dL (1+)     Leuk Esterase, UA Large (3+)     Nitrite, UA Negative     Urobilinogen, UA 0.2 E.U./dL    Blood Culture - Blood, Arm, Right [[id number]] Collected:  07/11/20 1211    Specimen:  Blood from Arm, Right Updated:  07/11/20 1241    Creatinine, Urine, Random - Urine, Clean Catch [[id number]] Collected:  07/11/20 1213    Specimen:  Urine, Clean Catch Updated:  07/11/20 1231    Lactic Acid, Plasma [[id number]]  (Normal) Collected:  07/11/20 1154    Specimen:  Blood Updated:  07/11/20 1221     Lactate 1.7 mmol/L     Blood Culture - Blood, Hand, Right [[id number]] Collected:  07/11/20 1154    Specimen:  Blood from Hand, Right Updated:  07/11/20 1207    Comprehensive Metabolic Panel [[id number]]  (Abnormal) Collected:  07/11/20 1114    Specimen:  Blood Updated:  07/11/20 1138     Glucose 179 mg/dL      BUN 94 mg/dL      Creatinine 4.38 mg/dL       Sodium 129 mmol/L      Potassium 4.2 mmol/L      Chloride 95 mmol/L      CO2 18.0 mmol/L      Calcium 8.5 mg/dL      Total Protein 6.6 g/dL      Albumin 2.70 g/dL      ALT (SGPT) 48 U/L      AST (SGOT) 30 U/L      Alkaline Phosphatase 228 U/L      Total Bilirubin 0.6 mg/dL      eGFR Non African Amer 13 mL/min/1.73      Comment: <15 Indicative of kidney failure.        eGFR   Amer --     Comment: <15 Indicative of kidney failure.        Globulin 3.9 gm/dL      A/G Ratio 0.7 g/dL      BUN/Creatinine Ratio 21.5     Anion Gap 16.0 mmol/L     Narrative:       GFR Normal >60  Chronic Kidney Disease <60  Kidney Failure <15      CBC & Differential [[id number]] Collected:  07/11/20 1114    Specimen:  Blood Updated:  07/11/20 1122    Narrative:       The following orders were created for panel order CBC & Differential.  Procedure                               Abnormality         Status                     ---------                               -----------         ------                     CBC Auto Differential[[id number]]        Abnormal            Final result                 Please view results for these tests on the individual orders.    CBC Auto Differential [[id number]]  (Abnormal) Collected:  07/11/20 1114    Specimen:  Blood Updated:  07/11/20 1122     WBC 19.49 10*3/mm3      RBC 3.43 10*6/mm3      Hemoglobin 11.1 g/dL      Hematocrit 33.7 %      MCV 98.3 fL      MCH 32.4 pg      MCHC 32.9 g/dL      RDW 14.6 %      RDW-SD 52.3 fl      MPV 10.5 fL      Platelets 291 10*3/mm3      Neutrophil % 84.7 %      Lymphocyte % 7.4 %      Monocyte % 4.7 %      Eosinophil % 0.1 %      Basophil % 0.4 %      Immature Grans % 2.7 %      Neutrophils, Absolute 16.51 10*3/mm3      Lymphocytes, Absolute 1.45 10*3/mm3      Monocytes, Absolute 0.91 10*3/mm3      Eosinophils, Absolute 0.02 10*3/mm3      Basophils, Absolute 0.07 10*3/mm3      Immature Grans, Absolute 0.53 10*3/mm3      nRBC 0.0 /100 WBC           Radiology:   Imaging  Results (Last 72 Hours)     Procedure Component Value Units Date/Time    US Renal Bilateral [[id number]] Collected:  07/11/20 1755     Updated:  07/11/20 1800    Narrative:       EXAMINATION:  US RENAL BILATERAL-  7/11/2020 5:46 PM CDT     HISTORY: Acute renal insufficiency.      COMPARISON: No comparison study.     TECHNIQUE: Grayscale and color flow imaging were performed.     FINDINGS: The urinary bladder is decompressed. Bladder wall thickening  is likely an artifact of underdistention. The visualized abdominal aorta  and inferior vena cava are normal in caliber. The right kidney measures  13.4 cm and the left kidney measures 13.6 cm. There is a 1.4 x 1.1 x 1.6  cm cyst in the upper to midpole region right kidney. The cortical  thickness and echogenicity are normal. There is no hydronephrosis.       Impression:       1. The renal size, cortical thickness and echogenicity are normal. No  hydronephrosis.  2. Small renal cyst on the right.  3. Bladder wall thickening is likely an artifact of underdistention.  This report was finalized on 07/11/2020 17:57 by Dr. Mikael Gallegos MD.    CT Abdomen Pelvis Without Contrast [[id number]] Collected:  07/11/20 1258     Updated:  07/11/20 1307    Narrative:       EXAMINATION:   CT ABDOMEN PELVIS WO CONTRAST-  7/11/2020 12:58 PM CDT     HISTORY: CT ABDOMEN PELVIS WO CONTRAST- 7/11/2020 12:33 PM CDT     HISTORY: Flank pain, recurrent stone disease suspected      COMPARISON: 05/27/2020      DOSE LENGTH PRODUCT: 235 mGy cm. Automated exposure control was also  utilized to decrease patient radiation dose.     TECHNIQUE: Noncontrast enhanced images of the abdomen and pelvis  obtained without oral contrast. Multiplanar reformatted images were  provided for review.      FINDINGS:   The lung bases and base of the heart are unremarkable.      LIVER: No focal liver lesion.      BILIARY SYSTEM: The gallbladder is unremarkable. No intrahepatic or  extrahepatic ductal dilatation.         PANCREAS: No focal pancreatic lesion.      SPLEEN: Unremarkable.      KIDNEYS AND ADRENALS: Adrenal glands are visualized. The renal contours  are normal in size shape and position. Vascular calcifications present  in the left renal hilum. Stranding around the right and left kidney is  noted..  The ureters are decompressed and normal in appearance.     RETROPERITONEUM: No mass, lymphadenopathy or hemorrhage.      GI TRACT: No evidence of obstruction or bowel wall thickening.  Diverticulosis is present..     OTHER: There is no mesenteric mass, lymphadenopathy or fluid collection.  The osseous structures and soft tissues demonstrate no worrisome  lesions. Right hip prosthesis is present. Avascular necrosis the left  femoral head with associated collapse is also noted. Extensive vascular  calcifications present in the abdominal aorta and iliac arteries.      PELVIS: A right inguinal hernia is present. This contains small bowel.  There is no associated obstruction at this time.. The urinary bladder is  normal in appearance.       Impression:       1. Right inguinal hernia containing small bowel. The bowel is not  obstructed at this time.  2. Extensive vascular calcifications present in the abdominal aorta and  iliac arteries.  3. 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 Diverticulosis.         This report was finalized on 07/11/2020 13:04 by Dr. Donald Rhodes MD.          Culture:  No components found for: WOUNDCUL, 3  No components found for: CSFCUL, 3  No components found for: BC, 3  No components found for: URINECUL, 3      Assessment     -Acute kidney injury secondary to acute tubular necrosis, also rule out AIN  -Chronic diarrhea-enterocolitis  -Hyponatremia  -Respiratory alkalosis with metabolic acidosis  -Weakness  -Hyperuricemia  -Hyperglycemia  -Leukocytosis    Plan:  At this time, patient may benefit from conservative management with intravenous fluid and antibiotics.  His serum uric  acid is elevated and will start him on allopurinol.  We will switch his fluids to bicarbonate based and provide a hypotonic formula.  I reviewed the renal ultrasound.  Avoid hypotension and nephrotoxins.  Currently the patient is nonoliguric.      Thank you for the consult, we appreciate the opportunity to provide care to your patients.  Feel free to contact me if I can be of any further assistance.      Bolivar Rueda MD  7/11/2020  20:03

## 2020-07-12 NOTE — PLAN OF CARE
"  Problem: Patient Care Overview  Goal: Plan of Care Review  7/12/2020 1500 by Gaurang Emmanuel, OTR/L  Outcome: Ongoing (interventions implemented as appropriate)  Flowsheets (Taken 7/12/2020 3023)  Progress: no change  Plan of Care Reviewed With: patient  Outcome Summary: OT eval completed. Pt has no complaints. Pt supervision to CGA for all functional mobility. Pt was max A to toby/doff socks stating \"it takes too much out of me.\" Pt is noted to have poor endurance with all tasks. Pt takes rest breaks as needed. Pt sits EOB without extremity support independently. Skilled OT recommended to address adls, functional mobility and endurance. Recommended d/c home with assist and HH.     "

## 2020-07-12 NOTE — PLAN OF CARE
Problem: Patient Care Overview  Goal: Plan of Care Review  Outcome: Ongoing (interventions implemented as appropriate)  Flowsheets (Taken 7/12/2020 1516)  Progress: no change  Plan of Care Reviewed With: patient  Note:   Stool sent, sample was negative. Pt denies pain. IVF continue. Pt ambulating in room .

## 2020-07-13 LAB
ALBUMIN SERPL-MCNC: 2.4 G/DL (ref 3.5–5.2)
ALBUMIN/GLOB SERPL: 0.7 G/DL
ALP SERPL-CCNC: 173 U/L (ref 39–117)
ALT SERPL W P-5'-P-CCNC: 31 U/L (ref 1–41)
ANION GAP SERPL CALCULATED.3IONS-SCNC: 13 MMOL/L (ref 5–15)
AST SERPL-CCNC: 22 U/L (ref 1–40)
BACTERIA SPEC AEROBE CULT: ABNORMAL
BASOPHILS # BLD AUTO: 0.07 10*3/MM3 (ref 0–0.2)
BASOPHILS NFR BLD AUTO: 0.5 % (ref 0–1.5)
BILIRUB SERPL-MCNC: 0.3 MG/DL (ref 0–1.2)
BUN SERPL-MCNC: 64 MG/DL (ref 8–23)
BUN/CREAT SERPL: 18.3 (ref 7–25)
CALCIUM SPEC-SCNC: 8 MG/DL (ref 8.6–10.5)
CHLORIDE SERPL-SCNC: 104 MMOL/L (ref 98–107)
CO2 SERPL-SCNC: 23 MMOL/L (ref 22–29)
CREAT SERPL-MCNC: 3.5 MG/DL (ref 0.76–1.27)
DEPRECATED RDW RBC AUTO: 50.2 FL (ref 37–54)
EOSINOPHIL # BLD AUTO: 0.05 10*3/MM3 (ref 0–0.4)
EOSINOPHIL NFR BLD AUTO: 0.4 % (ref 0.3–6.2)
ERYTHROCYTE [DISTWIDTH] IN BLOOD BY AUTOMATED COUNT: 14.3 % (ref 12.3–15.4)
GFR SERPL CREATININE-BSD FRML MDRD: 17 ML/MIN/1.73
GLOBULIN UR ELPH-MCNC: 3.3 GM/DL
GLUCOSE SERPL-MCNC: 100 MG/DL (ref 65–99)
HCT VFR BLD AUTO: 28.3 % (ref 37.5–51)
HGB BLD-MCNC: 9.6 G/DL (ref 13–17.7)
IMM GRANULOCYTES # BLD AUTO: 0.17 10*3/MM3 (ref 0–0.05)
IMM GRANULOCYTES NFR BLD AUTO: 1.3 % (ref 0–0.5)
IRON 24H UR-MRATE: 32 MCG/DL (ref 59–158)
IRON SATN MFR SERPL: 19 % (ref 20–50)
LYMPHOCYTES # BLD AUTO: 1.27 10*3/MM3 (ref 0.7–3.1)
LYMPHOCYTES NFR BLD AUTO: 9.4 % (ref 19.6–45.3)
MCH RBC QN AUTO: 32.3 PG (ref 26.6–33)
MCHC RBC AUTO-ENTMCNC: 33.9 G/DL (ref 31.5–35.7)
MCV RBC AUTO: 95.3 FL (ref 79–97)
MONOCYTES # BLD AUTO: 0.83 10*3/MM3 (ref 0.1–0.9)
MONOCYTES NFR BLD AUTO: 6.2 % (ref 5–12)
NEUTROPHILS NFR BLD AUTO: 11.06 10*3/MM3 (ref 1.7–7)
NEUTROPHILS NFR BLD AUTO: 82.2 % (ref 42.7–76)
NRBC BLD AUTO-RTO: 0 /100 WBC (ref 0–0.2)
PLATELET # BLD AUTO: 234 10*3/MM3 (ref 140–450)
PMV BLD AUTO: 10.4 FL (ref 6–12)
POTASSIUM SERPL-SCNC: 3.9 MMOL/L (ref 3.5–5.2)
PROT SERPL-MCNC: 5.7 G/DL (ref 6–8.5)
RBC # BLD AUTO: 2.97 10*6/MM3 (ref 4.14–5.8)
SODIUM SERPL-SCNC: 140 MMOL/L (ref 136–145)
TIBC SERPL-MCNC: 173 MCG/DL (ref 298–536)
TRANSFERRIN SERPL-MCNC: 116 MG/DL (ref 200–360)
WBC # BLD AUTO: 13.45 10*3/MM3 (ref 3.4–10.8)

## 2020-07-13 PROCEDURE — 84466 ASSAY OF TRANSFERRIN: CPT | Performed by: INTERNAL MEDICINE

## 2020-07-13 PROCEDURE — 25010000002 ENOXAPARIN PER 10 MG: Performed by: NURSE PRACTITIONER

## 2020-07-13 PROCEDURE — 85025 COMPLETE CBC W/AUTO DIFF WBC: CPT | Performed by: NURSE PRACTITIONER

## 2020-07-13 PROCEDURE — 97161 PT EVAL LOW COMPLEX 20 MIN: CPT

## 2020-07-13 PROCEDURE — 87040 BLOOD CULTURE FOR BACTERIA: CPT | Performed by: NURSE PRACTITIONER

## 2020-07-13 PROCEDURE — 80053 COMPREHEN METABOLIC PANEL: CPT | Performed by: NURSE PRACTITIONER

## 2020-07-13 PROCEDURE — 97535 SELF CARE MNGMENT TRAINING: CPT

## 2020-07-13 PROCEDURE — 25010000002 CEFTRIAXONE PER 250 MG: Performed by: NURSE PRACTITIONER

## 2020-07-13 PROCEDURE — 83540 ASSAY OF IRON: CPT | Performed by: INTERNAL MEDICINE

## 2020-07-13 RX ORDER — SODIUM CHLORIDE, SODIUM LACTATE, POTASSIUM CHLORIDE, CALCIUM CHLORIDE 600; 310; 30; 20 MG/100ML; MG/100ML; MG/100ML; MG/100ML
75 INJECTION, SOLUTION INTRAVENOUS CONTINUOUS
Status: DISCONTINUED | OUTPATIENT
Start: 2020-07-13 | End: 2020-07-15 | Stop reason: HOSPADM

## 2020-07-13 RX ORDER — MELATONIN
2000 DAILY
Status: DISCONTINUED | OUTPATIENT
Start: 2020-07-13 | End: 2020-07-15 | Stop reason: HOSPADM

## 2020-07-13 RX ADMIN — ASPIRIN 81 MG: 81 TABLET ORAL at 08:23

## 2020-07-13 RX ADMIN — CEFTRIAXONE SODIUM 2 G: 2 INJECTION, POWDER, FOR SOLUTION INTRAMUSCULAR; INTRAVENOUS at 17:48

## 2020-07-13 RX ADMIN — CLONIDINE HYDROCHLORIDE 0.2 MG: 0.2 TABLET ORAL at 22:04

## 2020-07-13 RX ADMIN — ENOXAPARIN SODIUM 30 MG: 30 INJECTION SUBCUTANEOUS at 17:48

## 2020-07-13 RX ADMIN — Medication 250 MG: at 08:23

## 2020-07-13 RX ADMIN — CLONIDINE HYDROCHLORIDE 0.2 MG: 0.2 TABLET ORAL at 06:40

## 2020-07-13 RX ADMIN — AMLODIPINE BESYLATE 10 MG: 10 TABLET ORAL at 08:23

## 2020-07-13 RX ADMIN — FAMOTIDINE 20 MG: 20 TABLET, FILM COATED ORAL at 08:22

## 2020-07-13 RX ADMIN — CLONIDINE HYDROCHLORIDE 0.2 MG: 0.2 TABLET ORAL at 13:48

## 2020-07-13 RX ADMIN — ALLOPURINOL 100 MG: 100 TABLET ORAL at 08:23

## 2020-07-13 RX ADMIN — METOPROLOL SUCCINATE 25 MG: 25 TABLET, EXTENDED RELEASE ORAL at 08:23

## 2020-07-13 RX ADMIN — VITAMIN D, TAB 1000IU (100/BT) 2000 UNITS: 25 TAB at 09:38

## 2020-07-13 RX ADMIN — SODIUM CHLORIDE, POTASSIUM CHLORIDE, SODIUM LACTATE AND CALCIUM CHLORIDE 75 ML/HR: 600; 310; 30; 20 INJECTION, SOLUTION INTRAVENOUS at 11:34

## 2020-07-13 RX ADMIN — Medication 3 MG: at 22:05

## 2020-07-13 RX ADMIN — Medication 250 MG: at 20:13

## 2020-07-13 RX ADMIN — CHOLESTYRAMINE 1 PACKET: 4 POWDER, FOR SUSPENSION ORAL at 08:22

## 2020-07-13 RX ADMIN — SODIUM BICARBONATE 125 ML/HR: 84 INJECTION, SOLUTION INTRAVENOUS at 03:48

## 2020-07-13 NOTE — PLAN OF CARE
Patient denies pain/nausea. Tolerating diet. IVF cont. Changed IV site related to the positional IV in the AC and patients inability to sleep. IV abx cont. Voiding well. Will cont to monitor.     Problem: Patient Care Overview  Goal: Plan of Care Review  Outcome: Ongoing (interventions implemented as appropriate)  Flowsheets (Taken 7/13/2020 0215)  Progress: no change  Plan of Care Reviewed With: patient

## 2020-07-13 NOTE — PROGRESS NOTES
Discharge Planning Assessment  [facility]     Patient Name: Ricardo Hugo  MRN: [id number]  Today's Date: 7/13/2020    Admit Date: 7/11/2020    Discharge Needs Assessment     Row Name 07/13/20 1423       Living Environment    Lives With  alone    Current Living Arrangements  home/apartment/condo    Primary Care Provided by  self    Provides Primary Care For  no one    Able to Return to Prior Arrangements  yes       Resource/Environmental Concerns    Resource/Environmental Concerns  none       Transition Planning    Patient/Family Anticipates Transition to  home    Patient/Family Anticipated Services at Transition  none       Discharge Needs Assessment    Readmission Within the Last 30 Days  no previous admission in last 30 days    Concerns to be Addressed  adjustment to diagnosis/illness    Equipment Currently Used at Home  none    Anticipated Changes Related to Illness  none    Equipment Needed After Discharge  none    Current Discharge Risk  lives alone    Discharge Coordination/Progress  Pt lives at home alone and plans to return home at d/c. Rec'd an order for home health for pt. He is considering but does not want to arrange it yet because he is not sure he will need it. He does have rx coverage. Will check back with pt closer to d/c.        Discharge Plan    No documentation.       Destination      Coordination has not been started for this encounter.      Durable Medical Equipment      Coordination has not been started for this encounter.      Dialysis/Infusion      Coordination has not been started for this encounter.      Home Medical Care      Coordination has not been started for this encounter.      Therapy      Coordination has not been started for this encounter.      Community Resources      Coordination has not been started for this encounter.          Demographic Summary    No documentation.       Functional Status    No documentation.       Psychosocial    No documentation.       Abuse/Neglect     No documentation.       Legal    No documentation.       Substance Abuse    No documentation.       Patient Forms    No documentation.           LINNETTE Ramos

## 2020-07-13 NOTE — PLAN OF CARE
Problem: Patient Care Overview  Goal: Individualization and Mutuality  Note:   RD nutrition assessment completed.  Received consult for unintentional weight loss, however pt has been eating well since admission to the hospital.  Will continue to monitor po intake, and follow for further d/c needs

## 2020-07-13 NOTE — PLAN OF CARE
Problem: Patient Care Overview  Goal: Individualization and Mutuality  Outcome: Ongoing (interventions implemented as appropriate)     Problem: Patient Care Overview  Goal: Plan of Care Review  7/13/2020 1129 by Francisco Castro COTA/L  Outcome: Ongoing (interventions implemented as appropriate)  Flowsheets (Taken 7/13/2020 1129)  Progress: improving  Plan of Care Reviewed With: patient  Note:   Pt. Performed adl bathing/dressing with supervision/set-up/cga from this shane/l, no issues or c/o's from tx!  7/13/2020 1129 by Francisco Castro COTA/L  Reactivated

## 2020-07-13 NOTE — PLAN OF CARE
Problem: Patient Care Overview  Goal: Plan of Care Review  Outcome: Outcome(s) achieved  Flowsheets (Taken 7/13/2020 1040)  Plan of Care Reviewed With: patient  Outcome Summary: PT IE complete.  Pt ambulated 300ft independent.  Pt at his baseline. PT to sign off.  Thank you for referral.

## 2020-07-13 NOTE — PROGRESS NOTES
Nephrology ([facility]) Progress Note      Patient:  Ricardo Hugo  YOB: 1950  Date of Service: 7/13/2020  MRN: [id number]   Acct: [id number]   Primary Care Physician: Joe Velasco MD  Advance Directive:   Code Status and Medical Interventions:   Ordered at: 07/11/20 1455     Level Of Support Discussed With:    Patient    Health Care Surrogate     Code Status:    CPR     Medical Interventions (Level of Support Prior to Arrest):    Full     Admit Date: 7/11/2020       Hospital Day: 2  Referring Provider: Jamie Pineda*      Patient personally seen and examined.  Complete chart including Consults, Notes, Operative Reports, Labs, Cardiology, and Radiology studies reviewed as able.        Subjective:  Ricardo Hugo is a 70 y.o. male  whom we were consulted for acute kidney injury.  No prior known renal issues.  History of hypertension, CAD with prior CABG.  Presented with weakness, fever, diarrhea, recent weight loss. Recently has had colonoscopy which showed colitis; patient was placed on steroid.  Initial creatinine 4.3.  Hospital course remarkable for some improvement of renal function with IV fluids    Today patient feeling much better. No new overnight issues. Urine output nonoliguric    Allergies:  Lortab [hydrocodone-acetaminophen]    Home Meds:  Facility-Administered Medications Prior to Admission   Medication Dose Route Frequency Provider Last Rate Last Dose   • cyanocobalamin injection 1,000 mcg  1,000 mcg Intramuscular Q28 Days Joe Velasco MD   1,000 mcg at 07/06/20 1552     Medications Prior to Admission   Medication Sig Dispense Refill Last Dose   • albuterol (PROVENTIL HFA;VENTOLIN HFA) 108 (90 BASE) MCG/ACT inhaler Inhale 2 puffs 4 (Four) Times a Day As Needed for Wheezing or Shortness of Air.   Past Week at Unknown time   • amLODIPine (NORVASC) 10 MG tablet Take 10 mg by mouth Daily.   7/11/2020 at Unknown time   • aspirin (ASPIR) 81 MG  EC tablet Take 81 mg by mouth Daily.   7/11/2020 at Unknown time   • azelastine (ASTELIN) 0.1 % nasal spray 1 spray into the nostril(s) as directed by provider 2 (Two) Times a Day As Needed for Rhinitis.   Past Week at Unknown time   • CloNIDine (CATAPRES) 0.2 MG tablet Take 0.2 mg by mouth 3 (Three) Times a Day.   7/11/2020 at Unknown time   • desoximetasone (TOPICORT) 0.25 % cream Apply 1 application topically to the appropriate area as directed 2 (Two) Times a Day.   7/11/2020 at Unknown time   • diphenoxylate-atropine (LOMOTIL) 2.5-0.025 MG per tablet Take 1 tablet by mouth 4 (Four) Times a Day As Needed for Diarrhea.   Past Week at Unknown time   • fexofenadine (ALLEGRA) 180 MG tablet Take 180 mg by mouth Daily.   7/11/2020 at Unknown time   • magnesium chloride ER (MAG-DELAY) 535 (64 MG) MG CR tablet Take 64 mg by mouth Daily.   7/11/2020 at Unknown time   • metoprolol succinate XL (TOPROL-XL) 25 MG 24 hr tablet Take 1 tablet by mouth Daily. 30 tablet 5 7/11/2020 at Unknown time   • Multiple Vitamins-Minerals (PRESERVISION/LUTEIN) capsule Take 1 capsule by mouth 2 (two) times a day.   7/11/2020 at Unknown time   • Omeprazole Magnesium 20.6 (20 BASE) MG capsule delayed-release Take 1 capsule by mouth Daily.   7/11/2020 at Unknown time   • valsartan (DIOVAN) 160 MG tablet Take 160 mg by mouth Daily.   7/11/2020 at Unknown time       Medicines:  Current Facility-Administered Medications   Medication Dose Route Frequency Provider Last Rate Last Dose   • acetaminophen (TYLENOL) tablet 650 mg  650 mg Oral Q4H PRN Maria L Zambrano APRN       • allopurinol (ZYLOPRIM) tablet 100 mg  100 mg Oral Daily Bolivar Rueda MD   100 mg at 07/13/20 0823   • amLODIPine (NORVASC) tablet 10 mg  10 mg Oral Daily Maria L Zambrano APRN   10 mg at 07/13/20 0823   • aspirin EC tablet 81 mg  81 mg Oral Daily Maria L Zambrano APRSHERITA   81 mg at 07/13/20 0876   • cefTRIAXone (ROCEPHIN) 2 g/100 mL 0.9% NS VTB (TOSIN)  2 g  Intravenous Q24H Woeltz, Maria L K, APRN   2 g at 07/12/20 1715   • cholecalciferol (VITAMIN D3) tablet 2,000 Units  2,000 Units Oral Daily Woeltz, Maria L K, APRN   2,000 Units at 07/13/20 0938   • cholestyramine (QUESTRAN) packet 1 packet  1 packet Oral Daily Woeltz, Maria L K, APRN   1 packet at 07/13/20 0822   • cloNIDine (CATAPRES) tablet 0.2 mg  0.2 mg Oral Q8H Woeltz, Maria L K, APRN   0.2 mg at 07/13/20 0640   • enoxaparin (LOVENOX) syringe 30 mg  30 mg Subcutaneous Q24H Woeltosiel, Maria L K, APRN   30 mg at 07/12/20 1715   • famotidine (PEPCID) tablet 20 mg  20 mg Oral Daily Jamie Pineda MD   20 mg at 07/13/20 0822   • melatonin tablet 3 mg  3 mg Oral Nightly PRN WoeltVanessa cartagenaMaria L K, APRN   3 mg at 07/12/20 2145   • metoprolol succinate XL (TOPROL-XL) 24 hr tablet 25 mg  25 mg Oral Daily Woeltosiel, Maria L K, APRN   25 mg at 07/13/20 0823   • ondansetron (ZOFRAN) injection 4 mg  4 mg Intravenous Q6H PRN Woeltosiel, Maria L K, APRN       • saccharomyces boulardii (FLORASTOR) capsule 250 mg  250 mg Oral BID Woeltz, Maria L K, APRN   250 mg at 07/13/20 0823   • Sodium Bicarbonate 8.4 % 100 mEq in Sodium chloride 0.45% 1000 mL infusion  125 mL/hr Intravenous Continuous Bolivar Rueda  mL/hr at 07/13/20 0348 125 mL/hr at 07/13/20 0348   • sodium chloride 0.9 % flush 10 mL  10 mL Intravenous Q12H Woeltz, Maria L K, APRN   10 mL at 07/12/20 2016   • sodium chloride 0.9 % flush 10 mL  10 mL Intravenous PRN WoeltzVanessaMaria L K, APRN           Past Medical History:  Past Medical History:   Diagnosis Date   • Acid reflux    • Allergic rhinitis    • Chronic mucoid otitis media    • Chronic rhinitis    • Chronic sinusitis    • Coronary artery disease     HEART BYPASS 2004   • Eustachian tube dysfunction    • Heart disease    • Hypertension    • Mixed hearing loss of left ear    • Perianal abscess    • Sensorineural hearing loss    • Tinnitus        Past Surgical History:  Past Surgical History:    Procedure Laterality Date   • COLONOSCOPY N/A 7/2/2020    Procedure: COLONOSCOPY WITH ANESTHESIA;  Surgeon: Adrien Brewster MD;  Location: [facility] ENDOSCOPY;  Service: Gastroenterology;  Laterality: N/A;  pre op: diarrhea  post op: polyps  PCP: Joe Velasco MD   • CORONARY ARTERY BYPASS GRAFT     • INCISION AND DRAINAGE PERIRECTAL ABSCESS N/A 3/3/2017    Procedure: INCISION AND DRAINAGE OF JEET ANAL ABSCESS;  Surgeon: Lynette Smith MD;  Location: [facility] OR;  Service:    • MYRINGOTOMY W/ TUBES Left 04/17/2017    06/10/2016   • TONSILLECTOMY     • TOTAL HIP ARTHROPLASTY         Family History  Family History   Problem Relation Age of Onset   • No Known Problems Mother    • No Known Problems Father    • Colon cancer Neg Hx    • Colon polyps Neg Hx        Social History  Social History     Socioeconomic History   • Marital status:      Spouse name: Not on file   • Number of children: Not on file   • Years of education: Not on file   • Highest education level: Not on file   Tobacco Use   • Smoking status: Former Smoker   • Smokeless tobacco: Never Used   • Tobacco comment: quit 2010   Substance and Sexual Activity   • Alcohol use: Yes     Alcohol/week: 14.0 standard drinks     Types: 14 Cans of beer per week     Comment: 2 beers daily   • Drug use: No   • Sexual activity: Defer         Review of Systems:  History obtained from chart review and the patient  General ROS: No fever or chills  Respiratory ROS: No cough, shortness of breath, wheezing  Cardiovascular ROS: No chest pain or palpitations  Gastrointestinal ROS: No abdominal pain or melena  Genito-Urinary ROS: No dysuria or hematuria  Neurological ROS: no headache or dizziness    Objective:  Patient Vitals for the past 24 hrs:   BP Temp Temp src Pulse Resp SpO2 Weight   07/13/20 0715 141/57 98.2 °F (36.8 °C) Oral 64 16 98 % --   07/13/20 0429 127/55 98.7 °F (37.1 °C) Oral 65 16 95 % 76.1 kg (167 lb 11.2 oz)   07/13/20 0014 152/63 98.8 °F (37.1  °C) Oral 88 16 95 % --   07/12/20 2038 150/66 99 °F (37.2 °C) Oral 75 18 97 % --   07/12/20 1515 168/54 98.3 °F (36.8 °C) Oral 72 16 98 % --   07/12/20 1115 166/62 98.6 °F (37 °C) Oral 73 16 99 % --       Intake/Output Summary (Last 24 hours) at 7/13/2020 1038  Last data filed at 7/13/2020 0843  Gross per 24 hour   Intake 3072 ml   Output 2975 ml   Net 97 ml     General: awake/alert    Neck: supple, no JVD  Chest:  clear to auscultation bilaterally without respiratory distress  CVS: regular rate and rhythm  Abdominal: soft, nontender, positive bowel sounds  Extremities: no cyanosis or edema  Skin: warm and dry without rash  Neuro: no focal motor deficits    Labs:  Results from last 7 days   Lab Units 07/13/20  0434 07/12/20  0705 07/11/20  1114   WBC 10*3/mm3 13.45* 17.00* 19.49*   HEMOGLOBIN g/dL 9.6* 11.0* 11.1*   HEMATOCRIT % 28.3* 31.3* 33.7*   PLATELETS 10*3/mm3 234 260 291         Results from last 7 days   Lab Units 07/13/20  0538 07/12/20  0705 07/11/20  1114   SODIUM mmol/L 140 135* 129*   POTASSIUM mmol/L 3.9 3.3* 4.2   CHLORIDE mmol/L 104 101 95*   CO2 mmol/L 23.0 18.0* 18.0*   BUN mg/dL 64* 78* 94*   CREATININE mg/dL 3.50* 3.74* 4.38*   CALCIUM mg/dL 8.0* 8.3* 8.5*   BILIRUBIN mg/dL 0.3 0.5 0.6   ALK PHOS U/L 173* 235* 228*   ALT (SGPT) U/L 31 41 48*   AST (SGOT) U/L 22 26 30   GLUCOSE mg/dL 100* 100* 179*       Radiology:   Imaging Results (Last 72 Hours)     Procedure Component Value Units Date/Time    US Renal Bilateral [[id number]] Collected:  07/11/20 1755     Updated:  07/11/20 1800    Narrative:       EXAMINATION:  US RENAL BILATERAL-  7/11/2020 5:46 PM CDT     HISTORY: Acute renal insufficiency.      COMPARISON: No comparison study.     TECHNIQUE: Grayscale and color flow imaging were performed.     FINDINGS: The urinary bladder is decompressed. Bladder wall thickening  is likely an artifact of underdistention. The visualized abdominal aorta  and inferior vena cava are normal in caliber. The right  kidney measures  13.4 cm and the left kidney measures 13.6 cm. There is a 1.4 x 1.1 x 1.6  cm cyst in the upper to midpole region right kidney. The cortical  thickness and echogenicity are normal. There is no hydronephrosis.       Impression:       1. The renal size, cortical thickness and echogenicity are normal. No  hydronephrosis.  2. Small renal cyst on the right.  3. Bladder wall thickening is likely an artifact of underdistention.  This report was finalized on 07/11/2020 17:57 by Dr. Mikael Gallegos MD.    CT Abdomen Pelvis Without Contrast [[id number]] Collected:  07/11/20 1258     Updated:  07/11/20 1307    Narrative:       EXAMINATION:   CT ABDOMEN PELVIS WO CONTRAST-  7/11/2020 12:58 PM CDT     HISTORY: CT ABDOMEN PELVIS WO CONTRAST- 7/11/2020 12:33 PM CDT     HISTORY: Flank pain, recurrent stone disease suspected      COMPARISON: 05/27/2020      DOSE LENGTH PRODUCT: 235 mGy cm. Automated exposure control was also  utilized to decrease patient radiation dose.     TECHNIQUE: Noncontrast enhanced images of the abdomen and pelvis  obtained without oral contrast. Multiplanar reformatted images were  provided for review.      FINDINGS:   The lung bases and base of the heart are unremarkable.      LIVER: No focal liver lesion.      BILIARY SYSTEM: The gallbladder is unremarkable. No intrahepatic or  extrahepatic ductal dilatation.      PANCREAS: No focal pancreatic lesion.      SPLEEN: Unremarkable.      KIDNEYS AND ADRENALS: Adrenal glands are visualized. The renal contours  are normal in size shape and position. Vascular calcifications present  in the left renal hilum. Stranding around the right and left kidney is  noted..  The ureters are decompressed and normal in appearance.     RETROPERITONEUM: No mass, lymphadenopathy or hemorrhage.      GI TRACT: No evidence of obstruction or bowel wall thickening.  Diverticulosis is present..     OTHER: There is no mesenteric mass, lymphadenopathy or fluid  collection.  The osseous structures and soft tissues demonstrate no worrisome  lesions. Right hip prosthesis is present. Avascular necrosis the left  femoral head with associated collapse is also noted. Extensive vascular  calcifications present in the abdominal aorta and iliac arteries.      PELVIS: A right inguinal hernia is present. This contains small bowel.  There is no associated obstruction at this time.. The urinary bladder is  normal in appearance.       Impression:       1. Right inguinal hernia containing small bowel. The bowel is not  obstructed at this time.  2. Extensive vascular calcifications present in the abdominal aorta and  iliac arteries.  3. 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 Diverticulosis.         This report was finalized on 07/11/2020 13:04 by Dr. Donald Rhodes MD.          Culture:  Blood Culture   Date Value Ref Range Status   07/11/2020 Escherichia coli (C)  Preliminary   07/11/2020 Escherichia coli (C)  Preliminary     Urine Culture   Date Value Ref Range Status   07/11/2020 >100,000 CFU/mL Escherichia coli (A)  Final         Assessment   1.  Acute kidney injury; ATN--improving  2.  Urinary tract infection  3.  Metabolic acidosis--improved  4.  Hypokalemia--improved  5.  Chronic diarrhea  6.  Anemia     Plan:  1.  Continue IV fluids; change composition   2.  Monitor labs      Slava Tate, APRSHERITA  7/13/2020  10:38

## 2020-07-13 NOTE — PLAN OF CARE
Problem: Patient Care Overview  Goal: Plan of Care Review  Outcome: Ongoing (interventions implemented as appropriate)  Flowsheets  Taken 7/13/2020 1620 by Crystal Aguilar RN  Progress: improving  Outcome Summary: Pt had no c/o pain this shift. IVF changed to LR per nephrology. Ambulating with minimal to no assist. Voiding. VSS.  Taken 7/13/2020 1129 by Francisco Castro COTA/L  Plan of Care Reviewed With: patient

## 2020-07-13 NOTE — THERAPY TREATMENT NOTE
Acute Care - Occupational Therapy Treatment Note  [facility]     Patient Name: Ricardo Hugo  : 1950  MRN: [id number]  Today's Date: 2020  Onset of Illness/Injury or Date of Surgery: 20  Date of Referral to OT: 20  Referring Physician: Dr. Pineda    Admit Date: 2020       ICD-10-CM ICD-9-CM   1. AMAYA (acute kidney injury) (CMS/[facility]) N17.9 584.9   2. Hyponatremia E87.1 276.1   3. Diarrhea, unspecified type R19.7 787.91   4. Acute UTI (urinary tract infection) N39.0 599.0   5. Impaired mobility and ADLs Z74.09 V49.89    Z78.9    6. Impaired mobility Z74.09 799.89     Patient Active Problem List   Diagnosis   • Eustachian tube dysfunction   • Chronic rhinitis   • Asymmetrical sensorineural hearing loss   • History of adenomatous polyp of colon   • Atherosclerosis of native artery of both lower extremities with intermittent claudication (CMS/[facility])   • Accelerated hypertension   • Diarrhea of infectious origin   • Anxiety disorder   • Bilateral leg edema   • Ischemic colitis (CMS/[facility])   • Diarrhea   • AMAYA (acute kidney injury) (CMS/[facility])   • Avascular necrosis of femoral head, left (CMS/[facility])   • Hyponatremia   • Acute cystitis   • Metabolic acidosis   • Hyperglycemia   • Weight loss   • E coli bacteremia     Past Medical History:   Diagnosis Date   • Acid reflux    • Allergic rhinitis    • Chronic mucoid otitis media    • Chronic rhinitis    • Chronic sinusitis    • Coronary artery disease     HEART BYPASS    • Eustachian tube dysfunction    • Heart disease    • Hypertension    • Mixed hearing loss of left ear    • Perianal abscess    • Sensorineural hearing loss    • Tinnitus      Past Surgical History:   Procedure Laterality Date   • COLONOSCOPY N/A 2020    Procedure: COLONOSCOPY WITH ANESTHESIA;  Surgeon: Adrien Brewster MD;  Location: [facility] ENDOSCOPY;  Service: Gastroenterology;  Laterality: N/A;  pre op: diarrhea  post op: polyps  PCP: Joe Velasco MD   • CORONARY  ARTERY BYPASS GRAFT     • INCISION AND DRAINAGE PERIRECTAL ABSCESS N/A 3/3/2017    Procedure: INCISION AND DRAINAGE OF JEET ANAL ABSCESS;  Surgeon: Lynette Smith MD;  [facility] OR;  Service:    • MYRINGOTOMY W/ TUBES Left 04/17/2017    06/10/2016   • TONSILLECTOMY     • TOTAL HIP ARTHROPLASTY         Therapy Treatment    Rehabilitation Treatment Summary     Row Name 07/13/20 0855             Treatment Time/Intention    Discipline  occupational therapy assistant  -CJ      Document Type  therapy note (daily note)  -CJ      Subjective Information  no complaints  -CJ      Mode of Treatment  occupational therapy  -CJ      Patient Effort  good  -CJ      Existing Precautions/Restrictions  fall  -CJ      Recorded by [CJ] Francisco Castro CARLISLE/L 07/13/20 1127      Row Name 07/13/20 0855             Bed Mobility Assessment/Treatment    Bed Mobility Assessment/Treatment  bed mobility (all) activities  -CJ      [place] Level (Bed Mobility)  independent;supervision  -CJ      Recorded by [CJ] Francisco Castro CARLISLE/L 07/13/20 1127      Row Name 07/13/20 0855             Functional Mobility    Functional Mobility- Ind. Level  supervision required;conditional independence  -CJ      Functional Mobility-Distance (Feet)  25  -CJ      Recorded by [CJ] Francisco Castro CARLISLE/L 07/13/20 1127      Row Name 07/13/20 0855             Sit-Stand Transfer    Sit-Stand [place] (Transfers)  independent  -CJ      Recorded by [CJ] Francisco Castro CARLISLE/L 07/13/20 1127      Row Name 07/13/20 0855             Stand-Sit Transfer    Stand-Sit [place] (Transfers)  supervision  -CJ      Recorded by [CJ] Francisco Castro CARLISLE/L 07/13/20 1127      Row Name 07/13/20 0855             ADL Assessment/Intervention    56756 - OT Self Care/Mgmt Minutes  40  -CJ      BADL Assessment/Intervention  bathing;upper body dressing;lower body dressing  -CJ      Recorded by [CJ] Francisco Castro CARLISLE/L 07/13/20 1127      Row Name  07/13/20 0855             Bathing Assessment/Intervention    Bathing [place] Level  bathing skills;set up;supervision  -      Assistive Devices (Bathing)  bath mitt;grab bars/tub rail;hand-held shower spray hose;shower chair  -      Bathing Position  supported sitting;supported standing  -CJ      Recorded by [CJ] Francisco Castro COTA/L 07/13/20 1127      Row Name 07/13/20 0855             Upper Body Dressing Assessment/Training    Upper Body Dressing [place]ina Level  doff;don;front opening garment;set up;contact guard assist  -CJ      Upper Body Dressing Position  unsupported sitting;unsupported standing  -CJ      Recorded by [CJ] Francisco Castro COTA/L 07/13/20 1127      Row Name 07/13/20 0855             Lower Body Dressing Assessment/Training    Lower Body Dressing [place] Level  doff;don;socks;set up;contact guard assist  -CJ      Lower Body Dressing Position  unsupported sitting;edge of bed sitting  -CJ      Recorded by [CJ] Francisco Castro COTA/L 07/13/20 1127      Row Name 07/13/20 0855             Positioning and Restraints    Pre-Treatment Position  in bed  -CJ      Post Treatment Position  bed  -CJ      In Bed  fowlers;call light within reach;encouraged to call for assist;side rails up x2  -CJ      Recorded by [CJ] Francisco Castro COTA/L 07/13/20 1127      Row Name 07/13/20 0855             Pain Assessment    Additional Documentation  Pain Scale 2: Word Pre/Post-Treatment (Group)  -      Recorded by [CJ] Francisco Castro COTA/L 07/13/20 1127      Row Name 07/13/20 0855             Pain Scale: Numbers Pre/Post-Treatment    Pain Scale: Numbers, Pretreatment  0/10 - no pain  -CJ      Pain Scale: Numbers, Post-Treatment  0/10 - no pain  -      Pain Intervention(s)  Repositioned;Rest;Shower  -CJ      Recorded by [CJ] Francisco Castro COTA/L 07/13/20 1127      Row Name 07/13/20 0855             Outcome Summary/Treatment Plan (OT)    Daily Summary of Progress (OT)   progress toward functional goals is good  -      Barriers to Overall Progress (OT)  Fall!  -      Plan for Continued Treatment (OT)  continue with ot poc!  -      Recorded by [CJ] Francisco Castro MAYLIN EVANGELISTA/NIK 07/13/20 1127        User Key  (r) = Recorded By, (t) = Taken By, (c) = Cosigned By    Initials Name Effective Dates Discipline     Francisco Castro, CARLISLE/L 08/02/16 -  OT             Occupational Therapy Education                 Title: PT OT SLP Therapies (In Progress)     Topic: Occupational Therapy (In Progress)     Point: ADL training (Done)     Description:   Instruct learner(s) on proper safety adaptation and remediation techniques during self care or transfers.   Instruct in proper use of assistive devices.              Learning Progress Summary           Patient Acceptance, E,TB, VU,DU,NR by  at 7/13/2020 1127    Comment:  Pt. performed adl bathing/dressing adls'!    Acceptance, E, VU by MM at 7/12/2020 1459    Comment:  OT role, benefits, POC, d/c planning, home safety, endurance techniques                   Point: Home exercise program (Not Started)     Description:   Instruct learner(s) on appropriate technique for monitoring, assisting and/or progressing therapeutic exercises/activities.              Learner Progress:   Not documented in this visit.          Point: Precautions (Done)     Description:   Instruct learner(s) on prescribed precautions during self-care and functional transfers.              Learning Progress Summary           Patient Acceptance, E,TB, VU,DU,NR by  at 7/13/2020 1127    Comment:  Pt. performed adl bathing/dressing adls'!    Acceptance, E, VU by MM at 7/12/2020 6219    Comment:  OT role, benefits, POC, d/c planning, home safety, endurance techniques                   Point: Body mechanics (Done)     Description:   Instruct learner(s) on proper positioning and spine alignment during self-care, functional mobility activities and/or exercises.               Learning Progress Summary           Patient Acceptance, E,TB, VU,DU,NR by  at 7/13/2020 1127    Comment:  Pt. performed adl bathing/dressing adls'!    Acceptance, E, VU by  at 7/12/2020 4229    Comment:  OT role, benefits, POC, d/c planning, home safety, endurance techniques                               User Key     Initials Effective Dates Name Provider Type [facility] 08/02/16 -  Francisco Castro COTA/L Occupational Therapy Assistant OT     07/05/20 -  Gaurang Emmanuel, OTR/L Occupational Therapist OT                OT Recommendation and Plan  Outcome Summary/Treatment Plan (OT)  Daily Summary of Progress (OT): progress toward functional goals is good  Barriers to Overall Progress (OT): Fall!  Plan for Continued Treatment (OT): continue with ot poc!  Daily Summary of Progress (OT): progress toward functional goals is good  Plan of Care Review  Plan of Care Reviewed With: patient  Plan of Care Reviewed With: patient  Outcome Measures     Row Name 07/13/20 0855 07/12/20 1400          How much help from another is currently needed...    Putting on and taking off regular lower body clothing?  3  -  2  -MM     Bathing (including washing, rinsing, and drying)  4  -  2  -MM     Toileting (which includes using toilet bed pan or urinal)  3  -  3  -MM     Putting on and taking off regular upper body clothing  4  -  4  -MM     Taking care of personal grooming (such as brushing teeth)  4  -  4  -MM     Eating meals  4  -  4  -MM     AM-PAC 6 Clicks Score (OT)  22  -  19  -MM        Functional Assessment    Outcome Measure Options  AM-PAC 6 Clicks Daily Activity (OT)  -  AM-PAC 6 Clicks Daily Activity (OT)  -       User Key  (r) = Recorded By, (t) = Taken By, (c) = Cosigned By    Initials Name Provider Type     Francisco Castro COTA/L Occupational Therapy Assistant    MM Gaurang Emmanuel, OTR/L Occupational Therapist           Time Calculation:   Time Calculation- OT     Row Name  07/13/20 0855             Time Calculation- OT    OT Start Time  0855  -      OT Stop Time  0935  -      OT Time Calculation (min)  40 min  -      Total Timed Code Minutes- OT  40 minute(s)  -      TCU Minutes- OT  40 min  -      OT Received On  07/13/20  -      OT Goal Re-Cert Due Date  07/22/20  -         Timed Charges    19737 - OT Self Care/Mgmt Minutes  40  -        User Key  (r) = Recorded By, (t) = Taken By, (c) = Cosigned By    Initials Name Provider Type     Francisco Castro COTA/L Occupational Therapy Assistant        Therapy Charges for Today     Code Description Service Date Service Provider Modifiers Qty    26896612473 HC OT SELF CARE/MGMT/TRAIN EA 15 MIN 7/13/2020 Francisco Castro COTA/L GO 3               MATEO Larios  7/13/2020

## 2020-07-13 NOTE — THERAPY DISCHARGE NOTE
Patient Name: Ricardo Hugo  : 1950    MRN: [id number]                              Today's Date: 2020       Admit Date: 2020    Visit Dx:     ICD-10-CM ICD-9-CM   1. AMAYA (acute kidney injury) (CMS/[facility]) N17.9 584.9   2. Hyponatremia E87.1 276.1   3. Diarrhea, unspecified type R19.7 787.91   4. Acute UTI (urinary tract infection) N39.0 599.0   5. Impaired mobility and ADLs Z74.09 V49.89    Z78.9    6. Impaired mobility Z74.09 799.89     Patient Active Problem List   Diagnosis   • Eustachian tube dysfunction   • Chronic rhinitis   • Asymmetrical sensorineural hearing loss   • History of adenomatous polyp of colon   • Atherosclerosis of native artery of both lower extremities with intermittent claudication (CMS/HCC)   • Accelerated hypertension   • Diarrhea of infectious origin   • Anxiety disorder   • Bilateral leg edema   • Ischemic colitis (CMS/HCC)   • Diarrhea   • AMAYA (acute kidney injury) (CMS/HCC)   • Avascular necrosis of femoral head, left (CMS/HCC)   • Hyponatremia   • Acute cystitis   • Metabolic acidosis   • Hyperglycemia   • Weight loss   • E coli bacteremia     Past Medical History:   Diagnosis Date   • Acid reflux    • Allergic rhinitis    • Chronic mucoid otitis media    • Chronic rhinitis    • Chronic sinusitis    • Coronary artery disease     HEART BYPASS    • Eustachian tube dysfunction    • Heart disease    • Hypertension    • Mixed hearing loss of left ear    • Perianal abscess    • Sensorineural hearing loss    • Tinnitus      Past Surgical History:   Procedure Laterality Date   • COLONOSCOPY N/A 2020    Procedure: COLONOSCOPY WITH ANESTHESIA;  Surgeon: Adrien Brewster MD;  Location: [facility] ENDOSCOPY;  Service: Gastroenterology;  Laterality: N/A;  pre op: diarrhea  post op: polyps  PCP: Joe Velasco MD   • CORONARY ARTERY BYPASS GRAFT     • INCISION AND DRAINAGE PERIRECTAL ABSCESS N/A 3/3/2017    Procedure: INCISION AND DRAINAGE OF JEET ANAL ABSCESS;  Surgeon:  Lynette Smith MD;  Location:  [facility] OR;  Service:    • MYRINGOTOMY W/ TUBES Left 04/17/2017    06/10/2016   • TONSILLECTOMY     • TOTAL HIP ARTHROPLASTY       General Information     [facility] Name 07/13/20 1045          PT Evaluation Time/Intention    Mode of Treatment  physical therapy  -[facility] Name 07/13/20 1045          General Information    Patient Profile Reviewed?  yes  -     Prior Level of Function  independent:;all household mobility;community mobility;ADL's  -     Barriers to Rehab  none identified  -[facility] Name 07/13/20 1045          Relationship/Environment    Lives With  alone  -[facility] Name 07/13/20 1045          Resource/Environmental Concerns    Current Living Arrangements  home/apartment/condo  -[facility] Name 07/13/20 1045          Stairs Within Home, Primary    Number of Stairs, Within Home, Primary  two  -     Stair Railings, Within Home, Primary  none  -[facility] Name 07/13/20 1045          Cognitive Assessment/Intervention- PT/OT    Orientation Status (Cognition)  oriented x 4  -     Personal Safety Interventions  fall prevention program maintained;gait belt;nonskid shoes/slippers when out of bed  -[facility] Name 07/13/20 1045          Safety Issues, Functional Mobility    Safety Issues Affecting Function (Mobility)  ability to follow commands  -       User Key  (r) = Recorded By, (t) = Taken By, (c) = Cosigned By    Initials Name Provider Type     Kevin Saldivar, PT Physical Therapist        Mobility     [facility] Name 07/13/20 1045          Bed Mobility Assessment/Treatment    Comment (Bed Mobility)  independent  -[facility] Name 07/13/20 1045          Sit-Stand Transfer    Sit-Stand [place] (Transfers)  independent  -[facility] Name 07/13/20 1045          Gait/Stairs Assessment/Training    [place] Level (Gait)  independent  -     Distance in Feet (Gait)  300  -     Deviations/Abnormal Patterns (Gait)  stride length decreased  -       User Key  (r) = Recorded  By, (t) = Taken By, (c) = Cosigned By    Initials Name Provider Type     Kevin Saldivar, PT Physical Therapist        Obj/Interventions     Row Name 07/13/20 1045          General ROM    GENERAL ROM COMMENTS  WFL  -[facility] 07/13/20 1045          MMT (Manual Muscle Testing)    General MMT Comments  5/5  -LH     Row Name 07/13/20 1045          Static Sitting Balance    Level of [place] (Unsupported Sitting, Static Balance)  independent  -     Sitting Position (Unsupported Sitting, Static Balance)  sitting on edge of bed  -LH     Row Name 07/13/20 1045          Dynamic Sitting Balance    Level of [place], Reaches Outside Midline (Sitting, Dynamic Balance)  independent  -     Sitting Position, Reaches Outside Midline (Sitting, Dynamic Balance)  sitting on edge of bed  -LH     Row Name 07/13/20 1045          Static Standing Balance    Level of [place] (Supported Standing, Static Balance)  independent  -LH     Row Name 07/13/20 1045          Dynamic Standing Balance    Level of [place], Reaches Outside Midline (Standing, Dynamic Balance)  standby assist  -LH     Row Name 07/13/20 1045          Sensory Assessment/Intervention    Sensory General Assessment  no sensation deficits identified  -       User Key  (r) = Recorded By, (t) = Taken By, (c) = Cosigned By    Initials Name Provider Type     Kevin Saldivar, PT Physical Therapist        Goals/Plan    No documentation.       Clinical Impression     Row Name 07/13/20 1045          Pain Assessment    Additional Documentation  Pain Scale: Numbers Pre/Post-Treatment (Group)  -LH     Row Name 07/13/20 1045          Pain Scale: Numbers Pre/Post-Treatment    Pain Scale: Numbers, Pretreatment  0/10 - no pain  -     Pain Scale: Numbers, Post-Treatment  0/10 - no pain  -     Pain Intervention(s)  Medication (See MAR)  -LH     Row Name 07/13/20 1045          Plan of Care Review    Plan of Care Reviewed With  patient  -     Outcome Summary  PT  IE complete.  Pt ambulated 300ft independent.  Pt at his baseline. PT to sign off.  Thank you for referral.  -     Row Name 07/13/20 1045          Physical Therapy Clinical Impression    Patient/Family Goals Statement (PT Clinical Impression)  return home  -     Criteria for Skilled Interventions Met (PT Clinical Impression)  no;treatment indicated  -     Row Name 07/13/20 1045          Positioning and Restraints    Pre-Treatment Position  in bed  -     Post Treatment Position  bed  -     In Bed  fowlers;call light within reach;side rails up x2  -       User Key  (r) = Recorded By, (t) = Taken By, (c) = Cosigned By    Initials Name Provider Type     Kevin Saldivar, PT Physical Therapist        Outcome Measures     Row Name 07/13/20 1107          How much help from another person do you currently need...    Turning from your back to your side while in flat bed without using bedrails?  4  -LH     Moving from lying on back to sitting on the side of a flat bed without bedrails?  4  -LH     Moving to and from a bed to a chair (including a wheelchair)?  4  -LH     Standing up from a chair using your arms (e.g., wheelchair, bedside chair)?  4  -LH     Climbing 3-5 steps with a railing?  4  -LH     To walk in hospital room?  4  -     AM-PAC 6 Clicks Score (PT)  24  -     Row Name 07/13/20 1107          Functional Assessment    Outcome Measure Options  AM-PAC 6 Clicks Basic Mobility (PT)  -       User Key  (r) = Recorded By, (t) = Taken By, (c) = Cosigned By    Initials Name Provider Type     Kevin Saldivar, PT Physical Therapist        Physical Therapy Education                 Title: PT OT SLP Therapies (In Progress)     Topic: Physical Therapy (Resolved)     Point: Mobility training (Resolved)     Description:   Instruct learner(s) on safety and technique for assisting patient out of bed, chair or wheelchair.  Instruct in the proper use of assistive devices, such as walker, crutches, cane or brace.               Patient Friendly Description:   It's important to get you on your feet again, but we need to do so in a way that is safe for you. Falling has serious consequences, and your personal safety is the most important thing of all.        When it's time to get out of bed, one of us or a family member will sit next to you on the bed to give you support.     If your doctor or nurse tells you to use a walker, crutches, a cane, or a brace, be sure you use it every time you get out of bed, even if you think you don't need it.    Learning Progress Summary           Patient Acceptance, E,D, DU,VU by  at 7/13/2020 1107    Comment:  benefits of progressive ambulation                               User Key     Initials Effective Dates Name Provider Type [facility] 08/02/16 -  Kevin Saldivar, PT Physical Therapist PT              PT Recommendation and Plan     Outcome Summary/Treatment Plan (PT)  Anticipated Discharge Disposition (PT): home  Plan of Care Reviewed With: patient  Outcome Summary: PT IE complete.  Pt ambulated 300ft independent.  Pt at his baseline. PT to sign off.  Thank you for referral.     Time Calculation:   PT Charges     Row Name 07/13/20 1109             Time Calculation    Start Time  1045  -      Stop Time  1109  -      Time Calculation (min)  24 min  -      PT Received On  07/13/20  -        User Key  (r) = Recorded By, (t) = Taken By, (c) = Cosigned By    Initials Name Provider Type     Kevin Saldivar, PT Physical Therapist        Therapy Charges for Today     Code Description Service Date Service Provider Modifiers Qty    39120080133 HC PT EVAL LOW COMPLEXITY 2 7/13/2020 Kevin Saldivar, PT GP 1          PT G-Codes  Outcome Measure Options: AM-PAC 6 Clicks Basic Mobility (PT)  AM-PAC 6 Clicks Score (PT): 24  AM-PAC 6 Clicks Score (OT): 19    PT Discharge Summary  Anticipated Discharge Disposition (PT): home    Kevin Saldivar PT  7/13/2020

## 2020-07-13 NOTE — PROGRESS NOTES
[facility] Medicine Services  INPATIENT PROGRESS NOTE    Length of Stay: 2  Date of Admission: 7/11/2020  Primary Care Physician: Joe Velasco MD    Subjective   Chief Complaint: Follow-up  HPI   Patient sitting up in bed eating breakfast.  He states he is feeling much better overall compared to admission.  He feels better than he has in months.  He denies any chest pain or shortness of breath.  He is eating and drinking very well.  He denies any urinary complaints.  He feels his weakness has significantly improved.  He has had 2 bowel movements since yesterday afternoon.    Review of Systems   All pertinent negatives and positives are as above. All other systems have been reviewed and are negative unless otherwise stated.     Objective    Temp:  [98.2 °F (36.8 °C)-99 °F (37.2 °C)] 98.2 °F (36.8 °C)  Heart Rate:  [64-88] 64  Resp:  [16-18] 16  BP: (127-168)/(54-66) 141/57  Physical Exam  Constitutional: He is oriented to person, place, and time. He appears well-developed and well-nourished. No distress.   HENT:   Head: Normocephalic and atraumatic.   Neck: Normal range of motion. Neck supple. No JVD present. No tracheal deviation present.   Cardiovascular: Normal rate and intact distal pulses. Exam reveals no gallop and no friction rub.   Murmur heard.  Sinus 60-76  Pulmonary/Chest: Effort normal and breath sounds normal. He has no wheezes. He has no rales.   Abdominal: Soft. Bowel sounds are normal. He exhibits no distension. There is no tenderness.   Musculoskeletal: Normal range of motion. He exhibits no edema or tenderness.   Neurological: He is alert and oriented to person, place, and time. No cranial nerve deficit. Coordination normal.   Skin: Skin is warm and dry. No rash noted. No erythema.   Psychiatric: He has a normal mood and affect. His behavior is normal. Judgment and thought content normal.   Vitals reviewed    Results Review:  I have reviewed the labs,  radiology results, and diagnostic studies.    Laboratory Data:   Results from last 7 days   Lab Units 07/13/20  0434 07/12/20  0705 07/11/20  1114   WBC 10*3/mm3 13.45* 17.00* 19.49*   HEMOGLOBIN g/dL 9.6* 11.0* 11.1*   HEMATOCRIT % 28.3* 31.3* 33.7*   PLATELETS 10*3/mm3 234 260 291     Results from last 7 days   Lab Units 07/13/20  0538 07/12/20  0705 07/11/20  1114   SODIUM mmol/L 140 135* 129*   POTASSIUM mmol/L 3.9 3.3* 4.2   CHLORIDE mmol/L 104 101 95*   CO2 mmol/L 23.0 18.0* 18.0*   BUN mg/dL 64* 78* 94*   CREATININE mg/dL 3.50* 3.74* 4.38*   CALCIUM mg/dL 8.0* 8.3* 8.5*   BILIRUBIN mg/dL 0.3 0.5 0.6   ALK PHOS U/L 173* 235* 228*   ALT (SGPT) U/L 31 41 48*   AST (SGOT) U/L 22 26 30   GLUCOSE mg/dL 100* 100* 179*     I have reviewed the patient current medications.     Assessment/Plan     Active Hospital Problems    Diagnosis   • E coli bacteremia   • AMAYA (acute kidney injury) (CMS/Formerly [facility])   • Avascular necrosis of femoral head, left (CMS/Formerly [facility])   • Hyponatremia   • Acute cystitis   • Metabolic acidosis   • Hyperglycemia   • Weight loss   • Diarrhea     Plan:  1.  Patient admitted 7/11/2020 with complaints of generalized weakness.  Noted to have an acute kidney injury with BUN of 94 and creatinine of 4.3.    2.  No previously known history of chronic kidney disease.  All available previous laboratory studies show creatinine ranging from 0.7-1.3.  Renal function slowly improving, BUN 64 and creatinine 3.5 today.  Acidosis resolved.  Anion gap has normalized.  IV fluids per nephrology.  Negative renal ultrasound.  Eosinophil smear positive, defer to nephrology for further management.  3.  Final urine culture shows growth of E. coli, susceptible to Rocephin.  Blood culture showed growth of E. coli as well, awaiting final susceptibility testing.  Continue Rocephin, day 3.  Repeat blood culture with RODRICK taken this morning.  White blood cell count continues to improve.  4.  GI panel negative.  Protonix discontinued in  favor of Pepcid.  Cholestyramine started yesterday.  Continue to monitor diarrhea.  Patient had colonoscopy on 7/2 per Dr. Brewster.  This showed Hemosiderin staining in the distal colon, question resolving or ongoing colitis.  Patient was prescribed a Medrol Dose Pack.  Multiple polyps were removed, several which showed mild active inflammation on pathology.  5.  Blood pressure improved after resumption of home medications Norvasc and clonidine.  Continue to hold valsartan with acute kidney injury.  6.  Continue PT/OT.  They have recommended home health services at time of discharge.   to follow.  7.  Vitamin D supplementation  8.  Labs in a.m.    Discharge Planning: I expect the patient to be discharged to home with home health in 2-3 days.    Maria L Zambrano, STERLING   07/13/20   09:17

## 2020-07-14 ENCOUNTER — APPOINTMENT (OUTPATIENT)
Dept: CARDIOLOGY | Facility: HOSPITAL | Age: 70
End: 2020-07-14

## 2020-07-14 PROBLEM — I47.29 VENTRICULAR TACHYCARDIA, NONSUSTAINED: Status: ACTIVE | Noted: 2020-07-14

## 2020-07-14 PROBLEM — D50.9 IRON DEFICIENCY ANEMIA: Status: ACTIVE | Noted: 2020-07-14

## 2020-07-14 PROBLEM — E83.42 HYPOMAGNESEMIA: Status: ACTIVE | Noted: 2020-07-14

## 2020-07-14 LAB
ALBUMIN SERPL-MCNC: 2.4 G/DL (ref 3.5–5.2)
ALBUMIN/GLOB SERPL: 0.7 G/DL
ALP SERPL-CCNC: 163 U/L (ref 39–117)
ALT SERPL W P-5'-P-CCNC: 31 U/L (ref 1–41)
ANION GAP SERPL CALCULATED.3IONS-SCNC: 12 MMOL/L (ref 5–15)
AST SERPL-CCNC: 25 U/L (ref 1–40)
BACTERIA SPEC AEROBE CULT: ABNORMAL
BASOPHILS # BLD AUTO: 0.05 10*3/MM3 (ref 0–0.2)
BASOPHILS NFR BLD AUTO: 0.4 % (ref 0–1.5)
BH CV ECHO MEAS - AO MAX PG (FULL): 7 MMHG
BH CV ECHO MEAS - AO MAX PG: 10.6 MMHG
BH CV ECHO MEAS - AO MEAN PG (FULL): 5 MMHG
BH CV ECHO MEAS - AO MEAN PG: 7 MMHG
BH CV ECHO MEAS - AO ROOT AREA (BSA CORRECTED): 1.7
BH CV ECHO MEAS - AO ROOT AREA: 9.1 CM^2
BH CV ECHO MEAS - AO ROOT DIAM: 3.4 CM
BH CV ECHO MEAS - AO V2 MAX: 163 CM/SEC
BH CV ECHO MEAS - AO V2 MEAN: 123 CM/SEC
BH CV ECHO MEAS - AO V2 VTI: 41.3 CM
BH CV ECHO MEAS - AVA(I,A): 2 CM^2
BH CV ECHO MEAS - AVA(I,D): 2 CM^2
BH CV ECHO MEAS - AVA(V,A): 2 CM^2
BH CV ECHO MEAS - AVA(V,D): 2 CM^2
BH CV ECHO MEAS - BSA(HAYCOCK): 2 M^2
BH CV ECHO MEAS - BSA: 2 M^2
BH CV ECHO MEAS - BZI_BMI: 22.6 KILOGRAMS/M^2
BH CV ECHO MEAS - BZI_METRIC_HEIGHT: 182.9 CM
BH CV ECHO MEAS - BZI_METRIC_WEIGHT: 75.8 KG
BH CV ECHO MEAS - EDV(CUBED): 120.6 ML
BH CV ECHO MEAS - EDV(MOD-SP4): 169 ML
BH CV ECHO MEAS - EDV(TEICH): 115 ML
BH CV ECHO MEAS - EF(CUBED): 81.8 %
BH CV ECHO MEAS - EF(MOD-SP4): 61.5 %
BH CV ECHO MEAS - EF(TEICH): 74.3 %
BH CV ECHO MEAS - ESV(CUBED): 22 ML
BH CV ECHO MEAS - ESV(MOD-SP4): 65.1 ML
BH CV ECHO MEAS - ESV(TEICH): 29.6 ML
BH CV ECHO MEAS - FS: 43.3 %
BH CV ECHO MEAS - IVS/LVPW: 1.3
BH CV ECHO MEAS - IVSD: 1.3 CM
BH CV ECHO MEAS - LA DIMENSION: 4.4 CM
BH CV ECHO MEAS - LA/AO: 1.3
BH CV ECHO MEAS - LAT PEAK E' VEL: 6.9 CM/SEC
BH CV ECHO MEAS - LV DIASTOLIC VOL/BSA (35-75): 85.6 ML/M^2
BH CV ECHO MEAS - LV MASS(C)D: 217.4 GRAMS
BH CV ECHO MEAS - LV MASS(C)DI: 110.2 GRAMS/M^2
BH CV ECHO MEAS - LV MAX PG: 3.6 MMHG
BH CV ECHO MEAS - LV MEAN PG: 2 MMHG
BH CV ECHO MEAS - LV SYSTOLIC VOL/BSA (12-30): 33 ML/M^2
BH CV ECHO MEAS - LV V1 MAX: 95.1 CM/SEC
BH CV ECHO MEAS - LV V1 MEAN: 65.6 CM/SEC
BH CV ECHO MEAS - LV V1 VTI: 24.4 CM
BH CV ECHO MEAS - LVIDD: 4.9 CM
BH CV ECHO MEAS - LVIDS: 2.8 CM
BH CV ECHO MEAS - LVLD AP4: 9.2 CM
BH CV ECHO MEAS - LVLS AP4: 8.1 CM
BH CV ECHO MEAS - LVOT AREA (M): 3.5 CM^2
BH CV ECHO MEAS - LVOT AREA: 3.5 CM^2
BH CV ECHO MEAS - LVOT DIAM: 2.1 CM
BH CV ECHO MEAS - LVPWD: 1 CM
BH CV ECHO MEAS - MED PEAK E' VEL: 7.72 CM/SEC
BH CV ECHO MEAS - MV A MAX VEL: 48.4 CM/SEC
BH CV ECHO MEAS - MV DEC SLOPE: 1113 CM/SEC^2
BH CV ECHO MEAS - MV DEC TIME: 0.13 SEC
BH CV ECHO MEAS - MV E MAX VEL: 145 CM/SEC
BH CV ECHO MEAS - MV E/A: 3
BH CV ECHO MEAS - RAP SYSTOLE: 5 MMHG
BH CV ECHO MEAS - RVSP: 50.4 MMHG
BH CV ECHO MEAS - SI(AO): 190 ML/M^2
BH CV ECHO MEAS - SI(CUBED): 50 ML/M^2
BH CV ECHO MEAS - SI(LVOT): 42.8 ML/M^2
BH CV ECHO MEAS - SI(MOD-SP4): 52.7 ML/M^2
BH CV ECHO MEAS - SI(TEICH): 43.3 ML/M^2
BH CV ECHO MEAS - SV(AO): 375 ML
BH CV ECHO MEAS - SV(CUBED): 98.6 ML
BH CV ECHO MEAS - SV(LVOT): 84.5 ML
BH CV ECHO MEAS - SV(MOD-SP4): 103.9 ML
BH CV ECHO MEAS - SV(TEICH): 85.4 ML
BH CV ECHO MEAS - TR MAX VEL: 337 CM/SEC
BH CV ECHO MEASUREMENTS AVERAGE E/E' RATIO: 19.84
BILIRUB SERPL-MCNC: 0.3 MG/DL (ref 0–1.2)
BUN SERPL-MCNC: 51 MG/DL (ref 8–23)
BUN/CREAT SERPL: 15.6 (ref 7–25)
CALCIUM SPEC-SCNC: 8.1 MG/DL (ref 8.6–10.5)
CHLORIDE SERPL-SCNC: 102 MMOL/L (ref 98–107)
CO2 SERPL-SCNC: 24 MMOL/L (ref 22–29)
CREAT SERPL-MCNC: 3.26 MG/DL (ref 0.76–1.27)
DEPRECATED RDW RBC AUTO: 50.3 FL (ref 37–54)
EOSINOPHIL # BLD AUTO: 0.07 10*3/MM3 (ref 0–0.4)
EOSINOPHIL NFR BLD AUTO: 0.6 % (ref 0.3–6.2)
ERYTHROCYTE [DISTWIDTH] IN BLOOD BY AUTOMATED COUNT: 14.2 % (ref 12.3–15.4)
GFR SERPL CREATININE-BSD FRML MDRD: 19 ML/MIN/1.73
GLOBULIN UR ELPH-MCNC: 3.3 GM/DL
GLUCOSE SERPL-MCNC: 106 MG/DL (ref 65–99)
GRAM STN SPEC: ABNORMAL
HCT VFR BLD AUTO: 26.3 % (ref 37.5–51)
HGB BLD-MCNC: 8.9 G/DL (ref 13–17.7)
IMM GRANULOCYTES # BLD AUTO: 0.11 10*3/MM3 (ref 0–0.05)
IMM GRANULOCYTES NFR BLD AUTO: 0.9 % (ref 0–0.5)
ISOLATED FROM: ABNORMAL
LEFT ATRIUM VOLUME INDEX: 45.8 ML/M2
LEFT ATRIUM VOLUME: 90.2 CM3
LYMPHOCYTES # BLD AUTO: 1.06 10*3/MM3 (ref 0.7–3.1)
LYMPHOCYTES NFR BLD AUTO: 8.8 % (ref 19.6–45.3)
MAGNESIUM SERPL-MCNC: 1.3 MG/DL (ref 1.6–2.4)
MAXIMAL PREDICTED HEART RATE: 148 BPM
MCH RBC QN AUTO: 32.7 PG (ref 26.6–33)
MCHC RBC AUTO-ENTMCNC: 33.8 G/DL (ref 31.5–35.7)
MCV RBC AUTO: 96.7 FL (ref 79–97)
MONOCYTES # BLD AUTO: 0.73 10*3/MM3 (ref 0.1–0.9)
MONOCYTES NFR BLD AUTO: 6 % (ref 5–12)
NEUTROPHILS NFR BLD AUTO: 10.05 10*3/MM3 (ref 1.7–7)
NEUTROPHILS NFR BLD AUTO: 83.3 % (ref 42.7–76)
NRBC BLD AUTO-RTO: 0 /100 WBC (ref 0–0.2)
PLATELET # BLD AUTO: 219 10*3/MM3 (ref 140–450)
PMV BLD AUTO: 10.7 FL (ref 6–12)
POTASSIUM SERPL-SCNC: 4.1 MMOL/L (ref 3.5–5.2)
PROT SERPL-MCNC: 5.7 G/DL (ref 6–8.5)
RBC # BLD AUTO: 2.72 10*6/MM3 (ref 4.14–5.8)
SODIUM SERPL-SCNC: 138 MMOL/L (ref 136–145)
STRESS TARGET HR: 126 BPM
WBC # BLD AUTO: 12.07 10*3/MM3 (ref 3.4–10.8)

## 2020-07-14 PROCEDURE — 97535 SELF CARE MNGMENT TRAINING: CPT

## 2020-07-14 PROCEDURE — 83735 ASSAY OF MAGNESIUM: CPT | Performed by: INTERNAL MEDICINE

## 2020-07-14 PROCEDURE — 25010000002 ENOXAPARIN PER 10 MG: Performed by: NURSE PRACTITIONER

## 2020-07-14 PROCEDURE — 85025 COMPLETE CBC W/AUTO DIFF WBC: CPT | Performed by: NURSE PRACTITIONER

## 2020-07-14 PROCEDURE — 25010000002 CEFTRIAXONE PER 250 MG: Performed by: NURSE PRACTITIONER

## 2020-07-14 PROCEDURE — 94762 N-INVAS EAR/PLS OXIMTRY CONT: CPT

## 2020-07-14 PROCEDURE — 25010000002 MAGNESIUM SULFATE IN D5W 1G/100ML (PREMIX) 1-5 GM/100ML-% SOLUTION: Performed by: NURSE PRACTITIONER

## 2020-07-14 PROCEDURE — 93306 TTE W/DOPPLER COMPLETE: CPT

## 2020-07-14 PROCEDURE — 99222 1ST HOSP IP/OBS MODERATE 55: CPT | Performed by: INTERNAL MEDICINE

## 2020-07-14 PROCEDURE — 25010000002 IRON SUCROSE PER 1 MG: Performed by: NURSE PRACTITIONER

## 2020-07-14 PROCEDURE — 25010000002 PERFLUTREN 6.52 MG/ML SUSPENSION: Performed by: INTERNAL MEDICINE

## 2020-07-14 PROCEDURE — 80053 COMPREHEN METABOLIC PANEL: CPT | Performed by: NURSE PRACTITIONER

## 2020-07-14 PROCEDURE — 93306 TTE W/DOPPLER COMPLETE: CPT | Performed by: INTERNAL MEDICINE

## 2020-07-14 RX ORDER — MAGNESIUM SULFATE 1 G/100ML
1 INJECTION INTRAVENOUS AS NEEDED
Status: DISCONTINUED | OUTPATIENT
Start: 2020-07-14 | End: 2020-07-15 | Stop reason: HOSPADM

## 2020-07-14 RX ORDER — MAGNESIUM SULFATE HEPTAHYDRATE 40 MG/ML
4 INJECTION, SOLUTION INTRAVENOUS AS NEEDED
Status: DISCONTINUED | OUTPATIENT
Start: 2020-07-14 | End: 2020-07-15 | Stop reason: HOSPADM

## 2020-07-14 RX ORDER — MAGNESIUM SULFATE HEPTAHYDRATE 40 MG/ML
2 INJECTION, SOLUTION INTRAVENOUS AS NEEDED
Status: DISCONTINUED | OUTPATIENT
Start: 2020-07-14 | End: 2020-07-15 | Stop reason: HOSPADM

## 2020-07-14 RX ADMIN — PERFLUTREN 8.48 MG: 6.52 INJECTION, SUSPENSION INTRAVENOUS at 16:37

## 2020-07-14 RX ADMIN — CLONIDINE HYDROCHLORIDE 0.2 MG: 0.2 TABLET ORAL at 05:54

## 2020-07-14 RX ADMIN — Medication 400 MG: at 20:22

## 2020-07-14 RX ADMIN — Medication 250 MG: at 09:22

## 2020-07-14 RX ADMIN — Medication 400 MG: at 09:22

## 2020-07-14 RX ADMIN — METOPROLOL SUCCINATE 25 MG: 25 TABLET, EXTENDED RELEASE ORAL at 09:22

## 2020-07-14 RX ADMIN — CHOLESTYRAMINE 1 PACKET: 4 POWDER, FOR SUSPENSION ORAL at 09:22

## 2020-07-14 RX ADMIN — CLONIDINE HYDROCHLORIDE 0.2 MG: 0.2 TABLET ORAL at 21:41

## 2020-07-14 RX ADMIN — ASPIRIN 81 MG: 81 TABLET ORAL at 09:22

## 2020-07-14 RX ADMIN — VITAMIN D, TAB 1000IU (100/BT) 2000 UNITS: 25 TAB at 09:22

## 2020-07-14 RX ADMIN — FAMOTIDINE 20 MG: 20 TABLET, FILM COATED ORAL at 09:22

## 2020-07-14 RX ADMIN — MAGNESIUM SULFATE 1 G: 1 INJECTION INTRAVENOUS at 14:04

## 2020-07-14 RX ADMIN — Medication 250 MG: at 20:23

## 2020-07-14 RX ADMIN — CEFTRIAXONE SODIUM 2 G: 2 INJECTION, POWDER, FOR SOLUTION INTRAMUSCULAR; INTRAVENOUS at 17:11

## 2020-07-14 RX ADMIN — SODIUM CHLORIDE, POTASSIUM CHLORIDE, SODIUM LACTATE AND CALCIUM CHLORIDE 75 ML/HR: 600; 310; 30; 20 INJECTION, SOLUTION INTRAVENOUS at 17:11

## 2020-07-14 RX ADMIN — AMLODIPINE BESYLATE 10 MG: 10 TABLET ORAL at 09:22

## 2020-07-14 RX ADMIN — IRON SUCROSE 200 MG: 20 INJECTION, SOLUTION INTRAVENOUS at 09:22

## 2020-07-14 RX ADMIN — MAGNESIUM SULFATE 1 G: 1 INJECTION INTRAVENOUS at 11:28

## 2020-07-14 RX ADMIN — CLONIDINE HYDROCHLORIDE 0.2 MG: 0.2 TABLET ORAL at 14:04

## 2020-07-14 RX ADMIN — Medication 3 MG: at 21:41

## 2020-07-14 RX ADMIN — ENOXAPARIN SODIUM 30 MG: 30 INJECTION SUBCUTANEOUS at 17:11

## 2020-07-14 RX ADMIN — ALLOPURINOL 100 MG: 100 TABLET ORAL at 09:22

## 2020-07-14 NOTE — PROGRESS NOTES
Nephrology ([facility]) Progress Note      Patient:  Ricardo Hugo  YOB: 1950  Date of Service: 7/14/2020  MRN: [id number]   Acct: [id number]   Primary Care Physician: Joe Velasco MD  Advance Directive:   Code Status and Medical Interventions:   Ordered at: 07/11/20 1455     Level Of Support Discussed With:    Patient    Health Care Surrogate     Code Status:    CPR     Medical Interventions (Level of Support Prior to Arrest):    Full     Admit Date: 7/11/2020       Hospital Day: 3  Referring Provider: Jamie Pineda*      Patient personally seen and examined.  Complete chart including Consults, Notes, Operative Reports, Labs, Cardiology, and Radiology studies reviewed as able.        Subjective:  Ricardo Hugo is a 70 y.o. male  whom we were consulted for acute kidney injury.  No prior known renal issues.  History of hypertension, CAD with prior CABG.  Presented with weakness, fever, diarrhea, recent weight loss. Recently has had colonoscopy which showed colitis; patient was placed on steroid.  Initial creatinine 4.3.  Hospital course remarkable for slow improvement of renal function with IV fluids    Today patient feeling better. No new overnight issues. Urine output nonoliguric    Allergies:  Lortab [hydrocodone-acetaminophen]    Home Meds:  Facility-Administered Medications Prior to Admission   Medication Dose Route Frequency Provider Last Rate Last Dose   • cyanocobalamin injection 1,000 mcg  1,000 mcg Intramuscular Q28 Days Joe Velasco MD   1,000 mcg at 07/06/20 1552     Medications Prior to Admission   Medication Sig Dispense Refill Last Dose   • albuterol (PROVENTIL HFA;VENTOLIN HFA) 108 (90 BASE) MCG/ACT inhaler Inhale 2 puffs 4 (Four) Times a Day As Needed for Wheezing or Shortness of Air.   Past Week at Unknown time   • amLODIPine (NORVASC) 10 MG tablet Take 10 mg by mouth Daily.   7/11/2020 at Unknown time   • aspirin (ASPIR) 81 MG EC  tablet Take 81 mg by mouth Daily.   7/11/2020 at Unknown time   • azelastine (ASTELIN) 0.1 % nasal spray 1 spray into the nostril(s) as directed by provider 2 (Two) Times a Day As Needed for Rhinitis.   Past Week at Unknown time   • CloNIDine (CATAPRES) 0.2 MG tablet Take 0.2 mg by mouth 3 (Three) Times a Day.   7/11/2020 at Unknown time   • desoximetasone (TOPICORT) 0.25 % cream Apply 1 application topically to the appropriate area as directed 2 (Two) Times a Day.   7/11/2020 at Unknown time   • diphenoxylate-atropine (LOMOTIL) 2.5-0.025 MG per tablet Take 1 tablet by mouth 4 (Four) Times a Day As Needed for Diarrhea.   Past Week at Unknown time   • fexofenadine (ALLEGRA) 180 MG tablet Take 180 mg by mouth Daily.   7/11/2020 at Unknown time   • magnesium chloride ER (MAG-DELAY) 535 (64 MG) MG CR tablet Take 64 mg by mouth Daily.   7/11/2020 at Unknown time   • metoprolol succinate XL (TOPROL-XL) 25 MG 24 hr tablet Take 1 tablet by mouth Daily. 30 tablet 5 7/11/2020 at Unknown time   • Multiple Vitamins-Minerals (PRESERVISION/LUTEIN) capsule Take 1 capsule by mouth 2 (two) times a day.   7/11/2020 at Unknown time   • Omeprazole Magnesium 20.6 (20 BASE) MG capsule delayed-release Take 1 capsule by mouth Daily.   7/11/2020 at Unknown time   • valsartan (DIOVAN) 160 MG tablet Take 160 mg by mouth Daily.   7/11/2020 at Unknown time       Medicines:  Current Facility-Administered Medications   Medication Dose Route Frequency Provider Last Rate Last Dose   • acetaminophen (TYLENOL) tablet 650 mg  650 mg Oral Q4H PRN Maria L Zambrano APRN       • allopurinol (ZYLOPRIM) tablet 100 mg  100 mg Oral Daily Bolivar Rueda MD   100 mg at 07/14/20 0922   • amLODIPine (NORVASC) tablet 10 mg  10 mg Oral Daily Maria L Zambrano APRN   10 mg at 07/14/20 0922   • aspirin EC tablet 81 mg  81 mg Oral Daily Maria L Zambrano APRN   81 mg at 07/14/20 0922   • cefTRIAXone (ROCEPHIN) 2 g/100 mL 0.9% NS VTB (TOSIN)  2 g  Intravenous Q24H Woeltz, Maria L K, APRN   2 g at 07/13/20 1748   • cholecalciferol (VITAMIN D3) tablet 2,000 Units  2,000 Units Oral Daily Woeltz, Maria L K, APRN   2,000 Units at 07/14/20 0922   • cholestyramine (QUESTRAN) packet 1 packet  1 packet Oral Daily Woeltz, Maria L K, APRN   1 packet at 07/14/20 0922   • cloNIDine (CATAPRES) tablet 0.2 mg  0.2 mg Oral Q8H Woeltz, Maria L K, APRN   0.2 mg at 07/14/20 0554   • enoxaparin (LOVENOX) syringe 30 mg  30 mg Subcutaneous Q24H Woeltz, Maria L K, APRN   30 mg at 07/13/20 1748   • famotidine (PEPCID) tablet 20 mg  20 mg Oral Daily Jamie Pineda MD   20 mg at 07/14/20 0922   • iron sucrose (VENOFER) 200 mg in sodium chloride 0.9 % 100 mL IVPB  200 mg Intravenous Q24H Woeltz, Maria L K, APRN   200 mg at 07/14/20 0922   • lactated ringers infusion  75 mL/hr Intravenous Continuous Slava Tate APRN 75 mL/hr at 07/13/20 1134 75 mL/hr at 07/13/20 1134   • magnesium oxide (MAGOX) tablet 400 mg  400 mg Oral BID Woeltosiel, Maria L K, APRN   400 mg at 07/14/20 0922   • magnesium sulfate 4 gram infusion - Mg less than or equal to 1mg/dL  4 g Intravenous PRN Hamida Rosenbaum APRN        Or   • magnesium sulfate 3 gram infusion (1gm x 3) - Mg 1.1 - 1.5 mg/dL  1 g Intravenous PRN Hamida Rosenbaum APRN        Or   • Magnesium Sulfate 2 gram infusion- Mg 1.6 - 1.9 mg/dL  2 g Intravenous PRN Hamida Rosenbaum APRSHERITA       • melatonin tablet 3 mg  3 mg Oral Nightly PRN Woeltosiel, Maria L K, APRN   3 mg at 07/13/20 2205   • metoprolol succinate XL (TOPROL-XL) 24 hr tablet 25 mg  25 mg Oral Daily Woeltz, Maria L K, APRN   25 mg at 07/14/20 0922   • ondansetron (ZOFRAN) injection 4 mg  4 mg Intravenous Q6H PRN WoeltzVanessaMaria L K, APRN       • saccharomyces boulardii (FLORASTOR) capsule 250 mg  250 mg Oral BID Woeltz, Maria L K, APRN   250 mg at 07/14/20 0922   • sodium chloride 0.9 % flush 10 mL  10 mL Intravenous Q12H WoeltzVanessaMaria L K, APRN   10 mL at 07/12/20  2016   • sodium chloride 0.9 % flush 10 mL  10 mL Intravenous PRN Maria L Zambrano, APRN           Past Medical History:  Past Medical History:   Diagnosis Date   • Acid reflux    • Allergic rhinitis    • Chronic mucoid otitis media    • Chronic rhinitis    • Chronic sinusitis    • Coronary artery disease     HEART BYPASS 2004   • Eustachian tube dysfunction    • Heart disease    • Hypertension    • Mixed hearing loss of left ear    • Perianal abscess    • Sensorineural hearing loss    • Tinnitus        Past Surgical History:  Past Surgical History:   Procedure Laterality Date   • COLONOSCOPY N/A 7/2/2020    Procedure: COLONOSCOPY WITH ANESTHESIA;  Surgeon: Adrien Brewster MD;  Location: [facility] ENDOSCOPY;  Service: Gastroenterology;  Laterality: N/A;  pre op: diarrhea  post op: polyps  PCP: Joe Velasco MD   • CORONARY ARTERY BYPASS GRAFT     • INCISION AND DRAINAGE PERIRECTAL ABSCESS N/A 3/3/2017    Procedure: INCISION AND DRAINAGE OF JEET ANAL ABSCESS;  Surgeon: Lynette Smith MD;  Location: [facility] OR;  Service:    • MYRINGOTOMY W/ TUBES Left 04/17/2017    06/10/2016   • TONSILLECTOMY     • TOTAL HIP ARTHROPLASTY         Family History  Family History   Problem Relation Age of Onset   • No Known Problems Mother    • No Known Problems Father    • Colon cancer Neg Hx    • Colon polyps Neg Hx        Social History  Social History     Socioeconomic History   • Marital status:      Spouse name: Not on file   • Number of children: Not on file   • Years of education: Not on file   • Highest education level: Not on file   Tobacco Use   • Smoking status: Former Smoker   • Smokeless tobacco: Never Used   • Tobacco comment: quit 2010   Substance and Sexual Activity   • Alcohol use: Yes     Alcohol/week: 14.0 standard drinks     Types: 14 Cans of beer per week     Comment: 2 beers daily   • Drug use: No   • Sexual activity: Defer         Review of Systems:  History obtained from chart review and the  "patient  General ROS: No fever or chills  Respiratory ROS: No cough, shortness of breath, wheezing  Cardiovascular ROS: No chest pain or palpitations  Gastrointestinal ROS: No abdominal pain or melena  Genito-Urinary ROS: No dysuria or hematuria  Neurological ROS: no headache or dizziness    Objective:  Patient Vitals for the past 24 hrs:   BP Temp Temp src Pulse Resp SpO2 Height   07/14/20 0741 119/42 98.5 °F (36.9 °C) Oral 58 18 95 % --   07/14/20 0500 147/64 97.5 °F (36.4 °C) Oral 65 18 97 % --   07/14/20 0048 125/48 98.4 °F (36.9 °C) Oral 62 16 97 % --   07/13/20 2040 142/64 99.3 °F (37.4 °C) Oral 72 16 94 % --   07/13/20 1517 142/52 98.6 °F (37 °C) Oral 62 16 95 % --   07/13/20 1115 133/54 97.8 °F (36.6 °C) Oral 63 16 99 % --   07/13/20 1057 -- -- -- -- -- -- 182.9 cm (72\")       Intake/Output Summary (Last 24 hours) at 7/14/2020 1015  Last data filed at 7/14/2020 0900  Gross per 24 hour   Intake 960 ml   Output 2800 ml   Net -1840 ml     General: awake/alert    Neck: supple, no JVD  Chest:  clear to auscultation bilaterally without respiratory distress  CVS: regular rate and rhythm  Abdominal: soft, nontender, positive bowel sounds  Extremities: no cyanosis or edema  Skin: warm and dry without rash  Neuro: no focal motor deficits    Labs:  Results from last 7 days   Lab Units 07/14/20  0231 07/13/20  0434 07/12/20  0705   WBC 10*3/mm3 12.07* 13.45* 17.00*   HEMOGLOBIN g/dL 8.9* 9.6* 11.0*   HEMATOCRIT % 26.3* 28.3* 31.3*   PLATELETS 10*3/mm3 219 234 260         Results from last 7 days   Lab Units 07/14/20  0231 07/13/20  0538 07/12/20  0705   SODIUM mmol/L 138 140 135*   POTASSIUM mmol/L 4.1 3.9 3.3*   CHLORIDE mmol/L 102 104 101   CO2 mmol/L 24.0 23.0 18.0*   BUN mg/dL 51* 64* 78*   CREATININE mg/dL 3.26* 3.50* 3.74*   CALCIUM mg/dL 8.1* 8.0* 8.3*   BILIRUBIN mg/dL 0.3 0.3 0.5   ALK PHOS U/L 163* 173* 235*   ALT (SGPT) U/L 31 31 41   AST (SGOT) U/L 25 22 26   GLUCOSE mg/dL 106* 100* 100*       Radiology: "   Imaging Results (Last 72 Hours)     Procedure Component Value Units Date/Time    US Renal Bilateral [[id number]] Collected:  07/11/20 1755     Updated:  07/11/20 1800    Narrative:       EXAMINATION:  US RENAL BILATERAL-  7/11/2020 5:46 PM CDT     HISTORY: Acute renal insufficiency.      COMPARISON: No comparison study.     TECHNIQUE: Grayscale and color flow imaging were performed.     FINDINGS: The urinary bladder is decompressed. Bladder wall thickening  is likely an artifact of underdistention. The visualized abdominal aorta  and inferior vena cava are normal in caliber. The right kidney measures  13.4 cm and the left kidney measures 13.6 cm. There is a 1.4 x 1.1 x 1.6  cm cyst in the upper to midpole region right kidney. The cortical  thickness and echogenicity are normal. There is no hydronephrosis.       Impression:       1. The renal size, cortical thickness and echogenicity are normal. No  hydronephrosis.  2. Small renal cyst on the right.  3. Bladder wall thickening is likely an artifact of underdistention.  This report was finalized on 07/11/2020 17:57 by Dr. Mikael Gallegos MD.    CT Abdomen Pelvis Without Contrast [[id number]] Collected:  07/11/20 1258     Updated:  07/11/20 1307    Narrative:       EXAMINATION:   CT ABDOMEN PELVIS WO CONTRAST-  7/11/2020 12:58 PM CDT     HISTORY: CT ABDOMEN PELVIS WO CONTRAST- 7/11/2020 12:33 PM CDT     HISTORY: Flank pain, recurrent stone disease suspected      COMPARISON: 05/27/2020      DOSE LENGTH PRODUCT: 235 mGy cm. Automated exposure control was also  utilized to decrease patient radiation dose.     TECHNIQUE: Noncontrast enhanced images of the abdomen and pelvis  obtained without oral contrast. Multiplanar reformatted images were  provided for review.      FINDINGS:   The lung bases and base of the heart are unremarkable.      LIVER: No focal liver lesion.      BILIARY SYSTEM: The gallbladder is unremarkable. No intrahepatic or  extrahepatic ductal dilatation.       PANCREAS: No focal pancreatic lesion.      SPLEEN: Unremarkable.      KIDNEYS AND ADRENALS: Adrenal glands are visualized. The renal contours  are normal in size shape and position. Vascular calcifications present  in the left renal hilum. Stranding around the right and left kidney is  noted..  The ureters are decompressed and normal in appearance.     RETROPERITONEUM: No mass, lymphadenopathy or hemorrhage.      GI TRACT: No evidence of obstruction or bowel wall thickening.  Diverticulosis is present..     OTHER: There is no mesenteric mass, lymphadenopathy or fluid collection.  The osseous structures and soft tissues demonstrate no worrisome  lesions. Right hip prosthesis is present. Avascular necrosis the left  femoral head with associated collapse is also noted. Extensive vascular  calcifications present in the abdominal aorta and iliac arteries.      PELVIS: A right inguinal hernia is present. This contains small bowel.  There is no associated obstruction at this time.. The urinary bladder is  normal in appearance.       Impression:       1. Right inguinal hernia containing small bowel. The bowel is not  obstructed at this time.  2. Extensive vascular calcifications present in the abdominal aorta and  iliac arteries.  3. 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 Diverticulosis.         This report was finalized on 07/11/2020 13:04 by Dr. Donald Rhodes MD.          Culture:  Blood Culture   Date Value Ref Range Status   07/11/2020 Escherichia coli (C)  Preliminary   07/11/2020 Escherichia coli (C)  Preliminary     Urine Culture   Date Value Ref Range Status   07/11/2020 >100,000 CFU/mL Escherichia coli (A)  Final         Assessment   1.  Acute kidney injury; ATN--improving  2.  Urinary tract infection  3.  Metabolic acidosis--improved  4.  Hypokalemia--improved  5.  Chronic diarrhea  6.  Anemia; iron deficiency    Plan:  1.  Continue IV fluids  2.  IV Venofer  3.  Monitor  labs      Slava Tate, STERLING  7/14/2020  10:15

## 2020-07-14 NOTE — CONSULTS
[facility] HEART GROUP CONSULT NOTE    Referring Provider: Jamie Pineda*    Reason for Consultation: VTa    Chief complaint:   Chief Complaint   Patient presents with   • Weakness - Generalized       Subjective      History of present illness:  Ricardo Hugo is a 70 y.o. male with history of hypertension, CAD s/p CABG in '04 per MARCELLA Forbes and hyperlipidemia who presented to the ER on 7/11 with complaints of a 4 month progression of weakness. He was admitted for acute kidney injury with a BUN and creat of 94/4.3. He has no known history of chronic kidney disease. He has been treated with IVF and abx per nephrology and primary. Kidney function is improving with a creat of 3.26 this morning. Per telemetry, patient had a 17sec run of NSVT (39 beats) last night. Cardiology was consulted. Mag was checked this morning and noted to be at 1.3. Patient denies symptoms during his NSVT last night. He denies chest pain, shortness of breath and palpitations prior to his admission. He reports he has not routinely followed with a cardiologist in years.     History  Past Medical History:   Diagnosis Date   • Acid reflux    • Allergic rhinitis    • Chronic mucoid otitis media    • Chronic rhinitis    • Chronic sinusitis    • Coronary artery disease     HEART BYPASS 2004   • Eustachian tube dysfunction    • Heart disease    • Hypertension    • Mixed hearing loss of left ear    • Perianal abscess    • Sensorineural hearing loss    • Tinnitus    ,   Past Surgical History:   Procedure Laterality Date   • COLONOSCOPY N/A 7/2/2020    Procedure: COLONOSCOPY WITH ANESTHESIA;  Surgeon: Adrien Brewster MD;  Location: [facility] ENDOSCOPY;  Service: Gastroenterology;  Laterality: N/A;  pre op: diarrhea  post op: polyps  PCP: Joe Velasco MD   • CORONARY ARTERY BYPASS GRAFT     • INCISION AND DRAINAGE PERIRECTAL ABSCESS N/A 3/3/2017    Procedure: INCISION AND DRAINAGE OF JEET ANAL ABSCESS;  Surgeon: Lynette ZARAGOZA  MD Luis;  Location: [facility] OR;  Service:    • MYRINGOTOMY W/ TUBES Left 04/17/2017    06/10/2016   • TONSILLECTOMY     • TOTAL HIP ARTHROPLASTY     ,   Family History   Problem Relation Age of Onset   • No Known Problems Mother    • No Known Problems Father    • Colon cancer Neg Hx    • Colon polyps Neg Hx    ,   Social History     Tobacco Use   • Smoking status: Former Smoker   • Smokeless tobacco: Never Used   • Tobacco comment: quit 2010   Substance Use Topics   • Alcohol use: Yes     Alcohol/week: 14.0 standard drinks     Types: 14 Cans of beer per week     Comment: 2 beers daily   • Drug use: No   ,     Medications    Prior to Admission medications    Medication Sig Start Date End Date Taking? Authorizing Provider   albuterol (PROVENTIL HFA;VENTOLIN HFA) 108 (90 BASE) MCG/ACT inhaler Inhale 2 puffs 4 (Four) Times a Day As Needed for Wheezing or Shortness of Air.   Yes Twyla Guevara MD   amLODIPine (NORVASC) 10 MG tablet Take 10 mg by mouth Daily.   Yes Twyla Guevara MD   aspirin (ASPIR) 81 MG EC tablet Take 81 mg by mouth Daily.   Yes Twyla Guevara MD   azelastine (ASTELIN) 0.1 % nasal spray 1 spray into the nostril(s) as directed by provider 2 (Two) Times a Day As Needed for Rhinitis.   Yes Twyla Guevara MD   CloNIDine (CATAPRES) 0.2 MG tablet Take 0.2 mg by mouth 3 (Three) Times a Day.   Yes Twyla Guevara MD   desoximetasone (TOPICORT) 0.25 % cream Apply 1 application topically to the appropriate area as directed 2 (Two) Times a Day.   Yes Twyla Guevara MD   diphenoxylate-atropine (LOMOTIL) 2.5-0.025 MG per tablet Take 1 tablet by mouth 4 (Four) Times a Day As Needed for Diarrhea.   Yes Twyla Guevara MD   fexofenadine (ALLEGRA) 180 MG tablet Take 180 mg by mouth Daily.   Yes Twyla Guevara MD   magnesium chloride ER (MAG-DELAY) 535 (64 MG) MG CR tablet Take 64 mg by mouth Daily.   Yes Twyla Guevara MD   metoprolol succinate XL  (TOPROL-XL) 25 MG 24 hr tablet Take 1 tablet by mouth Daily. 5/18/20  Yes Joe Velasco MD   Multiple Vitamins-Minerals (PRESERVISION/LUTEIN) capsule Take 1 capsule by mouth 2 (two) times a day.   Yes Twyla Guevara MD   Omeprazole Magnesium 20.6 (20 BASE) MG capsule delayed-release Take 1 capsule by mouth Daily.   Yes Twyla Guevara MD   valsartan (DIOVAN) 160 MG tablet Take 160 mg by mouth Daily.   Yes ProviderTwyla MD       Current Facility-Administered Medications   Medication Dose Route Frequency Provider Last Rate Last Dose   • acetaminophen (TYLENOL) tablet 650 mg  650 mg Oral Q4H PRN WoeltVanessa cartagenaMaria L K, APRN       • allopurinol (ZYLOPRIM) tablet 100 mg  100 mg Oral Daily Bolivar Rueda MD   100 mg at 07/14/20 0922   • amLODIPine (NORVASC) tablet 10 mg  10 mg Oral Daily Woeltosiel, Maria L K, APRN   10 mg at 07/14/20 0922   • aspirin EC tablet 81 mg  81 mg Oral Daily WoeltzVanessaMaria L K, APRN   81 mg at 07/14/20 0922   • cefTRIAXone (ROCEPHIN) 2 g/100 mL 0.9% NS VTB (TOSIN)  2 g Intravenous Q24H WoeltVanessa cartagenaMaria L K, APRN   2 g at 07/13/20 1748   • cholecalciferol (VITAMIN D3) tablet 2,000 Units  2,000 Units Oral Daily Woeltosiel, Maria L K, APRN   2,000 Units at 07/14/20 0922   • cholestyramine (QUESTRAN) packet 1 packet  1 packet Oral Daily WoeltVanessa cartagenaMaria L K, APRN   1 packet at 07/14/20 0922   • cloNIDine (CATAPRES) tablet 0.2 mg  0.2 mg Oral Q8H WoeltVanessa cartagenaMaria L K, APRN   0.2 mg at 07/14/20 0554   • enoxaparin (LOVENOX) syringe 30 mg  30 mg Subcutaneous Q24H WoeltVanessa cartagenaMaria L K, APRN   30 mg at 07/13/20 1748   • famotidine (PEPCID) tablet 20 mg  20 mg Oral Daily Jamie Pineda MD   20 mg at 07/14/20 0922   • iron sucrose (VENOFER) 200 mg in sodium chloride 0.9 % 100 mL IVPB  200 mg Intravenous Q24H Maria L Zambrano APRN   200 mg at 07/14/20 0922   • lactated ringers infusion  75 mL/hr Intravenous Continuous Slava Tate APRN 75 mL/hr at 07/13/20 1134 75 mL/hr  "at 07/13/20 1134   • magnesium oxide (MAGOX) tablet 400 mg  400 mg Oral BID Woeltz, Maria L K, APRN   400 mg at 07/14/20 0922   • melatonin tablet 3 mg  3 mg Oral Nightly PRN Woeltz, Maria L K, APRN   3 mg at 07/13/20 2205   • metoprolol succinate XL (TOPROL-XL) 24 hr tablet 25 mg  25 mg Oral Daily Woeltz, Maria L K, APRN   25 mg at 07/14/20 0922   • ondansetron (ZOFRAN) injection 4 mg  4 mg Intravenous Q6H PRN Woeltz, Maria L K, APRN       • saccharomyces boulardii (FLORASTOR) capsule 250 mg  250 mg Oral BID Woeltz, Maria L K, APRN   250 mg at 07/14/20 0922   • sodium chloride 0.9 % flush 10 mL  10 mL Intravenous Q12H Woeltz, Maria L K, APRN   10 mL at 07/12/20 2016   • sodium chloride 0.9 % flush 10 mL  10 mL Intravenous PRN Woeltz, Maria L K, APRN           Allergies:  Lortab [hydrocodone-acetaminophen]    Review of Systems  Review of Systems   Constitutional: Negative for chills, diaphoresis, fatigue and unexpected weight change.   HENT: Negative for nosebleeds.    Respiratory: Negative for cough, chest tightness, shortness of breath and wheezing.    Cardiovascular: Negative for chest pain, palpitations and leg swelling.   Gastrointestinal: Negative for anal bleeding, blood in stool, diarrhea and nausea.   Neurological: Negative for dizziness, syncope and light-headedness.   All other systems reviewed and are negative.      Objective     Physical Exam:  Patient Vitals for the past 24 hrs:   BP Temp Temp src Pulse Resp SpO2 Height   07/14/20 0741 119/42 98.5 °F (36.9 °C) Oral 58 18 95 % --   07/14/20 0500 147/64 97.5 °F (36.4 °C) Oral 65 18 97 % --   07/14/20 0048 125/48 98.4 °F (36.9 °C) Oral 62 16 97 % --   07/13/20 2040 142/64 99.3 °F (37.4 °C) Oral 72 16 94 % --   07/13/20 1517 142/52 98.6 °F (37 °C) Oral 62 16 95 % --   07/13/20 1115 133/54 97.8 °F (36.6 °C) Oral 63 16 99 % --   07/13/20 1057 -- -- -- -- -- -- 182.9 cm (72\")     Physical Exam   Constitutional: He is oriented to person, place, and " time. He appears well-developed and well-nourished. No distress.   HENT:   Head: Normocephalic.   Mouth/Throat: No oropharyngeal exudate.   Eyes: Pupils are equal, round, and reactive to light. Conjunctivae and EOM are normal. No scleral icterus.   Neck: Normal range of motion. Neck supple.   Cardiovascular: Normal rate, regular rhythm, normal heart sounds and intact distal pulses.   No murmur heard.  Pulmonary/Chest: Effort normal and breath sounds normal. No respiratory distress. He has no wheezes. He has no rales. He exhibits no tenderness.   Abdominal: Soft. Bowel sounds are normal. He exhibits no distension. There is no tenderness.   Musculoskeletal: Normal range of motion. He exhibits no edema.   Neurological: He is alert and oriented to person, place, and time. He has normal reflexes.   Skin: Skin is warm and dry. No rash noted. He is not diaphoretic. No erythema. No pallor.   Psychiatric: He has a normal mood and affect. His behavior is normal.   Vitals reviewed.      Results Review:   I reviewed the patient's new clinical results.    Lab Results (last 72 hours)     Procedure Component Value Units Date/Time    Blood Culture - Blood, Arm, Right [[id number]]  (Abnormal) Collected:  07/11/20 1211    Specimen:  Blood from Arm, Right Updated:  07/14/20 0521     Blood Culture Escherichia coli     Comment: Refer to previous blood culture collected on 7/11/2020 1254 for MICs.        Isolated from Anaerobic Bottle     Gram Stain Anaerobic Bottle Gram negative bacilli    Blood Culture - Blood, Hand, Right [[id number]]  (Abnormal)  (Susceptibility) Collected:  07/11/20 1154    Specimen:  Blood from Hand, Right Updated:  07/14/20 0521     Blood Culture Escherichia coli     Isolated from Anaerobic Bottle     Gram Stain Anaerobic Bottle Gram negative bacilli    Susceptibility      Escherichia coli     ASPEN     Ampicillin Resistant     Ampicillin + Sulbactam Resistant     Cefepime Susceptible     Ceftazidime Susceptible      Ceftriaxone Susceptible     Gentamicin Susceptible     Levofloxacin Susceptible     Piperacillin + Tazobactam Susceptible     Trimethoprim + Sulfamethoxazole Susceptible                Susceptibility Comments     Escherichia coli    Cefazolin sensitivity will not be reported for Enterobacteriaceae in non-urine isolates. If cefazolin is preferred, please call the microbiology lab to request an E-test.  With the exception of urinary-sourced infections, aminoglycosides should not be used as monotherapy.             Blood Culture With RODRICK - Blood, Arm, Left [[id number]] Collected:  07/13/20 0430    Specimen:  Blood from Arm, Left Updated:  07/14/20 0500     Blood Culture No growth at 24 hours    Comprehensive Metabolic Panel [[id number]]  (Abnormal) Collected:  07/14/20 0231    Specimen:  Blood Updated:  07/14/20 0339     Glucose 106 mg/dL      BUN 51 mg/dL      Creatinine 3.26 mg/dL      Sodium 138 mmol/L      Potassium 4.1 mmol/L      Chloride 102 mmol/L      CO2 24.0 mmol/L      Calcium 8.1 mg/dL      Total Protein 5.7 g/dL      Albumin 2.40 g/dL      ALT (SGPT) 31 U/L      AST (SGOT) 25 U/L      Alkaline Phosphatase 163 U/L      Total Bilirubin 0.3 mg/dL      eGFR Non African Amer 19 mL/min/1.73      Globulin 3.3 gm/dL      A/G Ratio 0.7 g/dL      BUN/Creatinine Ratio 15.6     Anion Gap 12.0 mmol/L     Narrative:       GFR Normal >60  Chronic Kidney Disease <60  Kidney Failure <15      Magnesium [[id number]]  (Abnormal) Collected:  07/14/20 0231    Specimen:  Blood Updated:  07/14/20 0339     Magnesium 1.3 mg/dL     CBC & Differential [[id number]] Collected:  07/14/20 0231    Specimen:  Blood Updated:  07/14/20 0316    Narrative:       The following orders were created for panel order CBC & Differential.  Procedure                               Abnormality         Status                     ---------                               -----------         ------                     CBC Auto Differential[[id number]]         Abnormal            Final result                 Please view results for these tests on the individual orders.    CBC Auto Differential [[id number]]  (Abnormal) Collected:  07/14/20 0231    Specimen:  Blood Updated:  07/14/20 0316     WBC 12.07 10*3/mm3      RBC 2.72 10*6/mm3      Hemoglobin 8.9 g/dL      Hematocrit 26.3 %      MCV 96.7 fL      MCH 32.7 pg      MCHC 33.8 g/dL      RDW 14.2 %      RDW-SD 50.3 fl      MPV 10.7 fL      Platelets 219 10*3/mm3      Neutrophil % 83.3 %      Lymphocyte % 8.8 %      Monocyte % 6.0 %      Eosinophil % 0.6 %      Basophil % 0.4 %      Immature Grans % 0.9 %      Neutrophils, Absolute 10.05 10*3/mm3      Lymphocytes, Absolute 1.06 10*3/mm3      Monocytes, Absolute 0.73 10*3/mm3      Eosinophils, Absolute 0.07 10*3/mm3      Basophils, Absolute 0.05 10*3/mm3      Immature Grans, Absolute 0.11 10*3/mm3      nRBC 0.0 /100 WBC     Iron Profile [[id number]]  (Abnormal) Collected:  07/13/20 0538    Specimen:  Blood Updated:  07/13/20 1708     Iron 32 mcg/dL      Iron Saturation 19 %      Transferrin 116 mg/dL      TIBC 173 mcg/dL     Comprehensive Metabolic Panel [[id number]]  (Abnormal) Collected:  07/13/20 0538    Specimen:  Blood Updated:  07/13/20 0644     Glucose 100 mg/dL      BUN 64 mg/dL      Creatinine 3.50 mg/dL      Sodium 140 mmol/L      Potassium 3.9 mmol/L      Chloride 104 mmol/L      CO2 23.0 mmol/L      Calcium 8.0 mg/dL      Total Protein 5.7 g/dL      Albumin 2.40 g/dL      ALT (SGPT) 31 U/L      AST (SGOT) 22 U/L      Alkaline Phosphatase 173 U/L      Total Bilirubin 0.3 mg/dL      eGFR Non African Amer 17 mL/min/1.73      Globulin 3.3 gm/dL      A/G Ratio 0.7 g/dL      BUN/Creatinine Ratio 18.3     Anion Gap 13.0 mmol/L     Narrative:       GFR Normal >60  Chronic Kidney Disease <60  Kidney Failure <15      CBC & Differential [[id number]] Collected:  07/13/20 0434    Specimen:  Blood Updated:  07/13/20 0457    Narrative:       The following orders were created for  panel order CBC & Differential.  Procedure                               Abnormality         Status                     ---------                               -----------         ------                     CBC Auto Differential[[id number]]        Abnormal            Final result                 Please view results for these tests on the individual orders.    CBC Auto Differential [[id number]]  (Abnormal) Collected:  07/13/20 0434    Specimen:  Blood Updated:  07/13/20 0457     WBC 13.45 10*3/mm3      RBC 2.97 10*6/mm3      Hemoglobin 9.6 g/dL      Hematocrit 28.3 %      MCV 95.3 fL      MCH 32.3 pg      MCHC 33.9 g/dL      RDW 14.3 %      RDW-SD 50.2 fl      MPV 10.4 fL      Platelets 234 10*3/mm3      Neutrophil % 82.2 %      Lymphocyte % 9.4 %      Monocyte % 6.2 %      Eosinophil % 0.4 %      Basophil % 0.5 %      Immature Grans % 1.3 %      Neutrophils, Absolute 11.06 10*3/mm3      Lymphocytes, Absolute 1.27 10*3/mm3      Monocytes, Absolute 0.83 10*3/mm3      Eosinophils, Absolute 0.05 10*3/mm3      Basophils, Absolute 0.07 10*3/mm3      Immature Grans, Absolute 0.17 10*3/mm3      nRBC 0.0 /100 WBC     Urine Culture - Urine, Urine, Clean Catch [[id number]]  (Abnormal)  (Susceptibility) Collected:  07/11/20 1213    Specimen:  Urine, Clean Catch Updated:  07/13/20 0301     Urine Culture >100,000 CFU/mL Escherichia coli    Susceptibility      Escherichia coli     ASPEN     Ampicillin Resistant     Ampicillin + Sulbactam Resistant     Cefazolin Susceptible     Cefepime Susceptible     Ceftazidime Susceptible     Ceftriaxone Susceptible     Gentamicin Susceptible     Levofloxacin Susceptible     Nitrofurantoin Susceptible     Piperacillin + Tazobactam Susceptible     Tetracycline Susceptible     Trimethoprim + Sulfamethoxazole Susceptible                    Vitamin D 25 Hydroxy [[id number]]  (Abnormal) Collected:  07/12/20 0705    Specimen:  Blood Updated:  07/12/20 2207     25 Hydroxy, Vitamin D 23.5 ng/ml      Narrative:       Reference Range for Total Vitamin D 25(OH)     Deficiency <20.0 ng/mL   Insufficiency 21-29 ng/mL   Sufficiency  ng/mL  Toxicity >100 ng/ml    Results may be falsely increased if patient taking Biotin.      Eosinophil Smear - Urine, Urine, Clean Catch [[id number]]  (Abnormal) Collected:  07/11/20 1756    Specimen:  Urine, Clean Catch Updated:  07/12/20 1257     Eosinophil Smear 2 % EOS/100 Cells     Urea Nitrogen, Urine - Urine, Clean Catch [[id number]] Collected:  07/11/20 1756    Specimen:  Urine, Clean Catch Updated:  07/12/20 1212     Urea Nitrogen, Urine 221 mg/dL     Narrative:       Reference intervals for random urine have not been established.  Clinical usage is dependent upon physician's interpretation in combination with other laboratory tests.       Creatinine, Urine, Random - Urine, Clean Catch [[id number]] Collected:  07/11/20 1756    Specimen:  Urine, Clean Catch Updated:  07/12/20 1212     Creatinine, Urine 18.3 mg/dL     Narrative:       Reference intervals for random urine have not been established.  Clinical usage is dependent upon physician's interpretation in combination with other laboratory tests.       Gastrointestinal Panel, PCR - Stool, Per Rectum [[id number]]  (Normal) Collected:  07/12/20 0742    Specimen:  Stool from Per Rectum Updated:  07/12/20 0902     Campylobacter Not Detected     Plesiomonas shigelloides Not Detected     Salmonella Not Detected     Vibrio Not Detected     Vibrio cholerae Not Detected     Yersinia enterocolitica Not Detected     Enteroaggregative E. coli (EAEC) Not Detected     Enteropathogenic E. coli (EPEC) Not Detected     Enterotoxigenic E. coli (ETEC) lt/st Not Detected     Shiga-like toxin-producing E. coli (STEC) stx1/stx2 Not Detected     E. coli O157 Not Detected     Shigella/Enteroinvasive E. coli (EIEC) Not Detected     Cryptosporidium Not Detected     Cyclospora cayetanensis Not Detected     Entamoeba histolytica Not Detected      Giardia lamblia Not Detected     Adenovirus F40/41 Not Detected     Astrovirus Not Detected     Norovirus GI/GII Not Detected     Rotavirus A Not Detected     Sapovirus (I, II, IV or V) Not Detected    Narrative:       If Aeromonas, Staphylococcus aureus or Bacillus cereus are suspected, please order [id number]: Stool Culture, Aeromonas, S aureus, B Cereus.    Comprehensive Metabolic Panel [[id number]]  (Abnormal) Collected:  07/12/20 0705    Specimen:  Blood Updated:  07/12/20 0746     Glucose 100 mg/dL      BUN 78 mg/dL      Creatinine 3.74 mg/dL      Sodium 135 mmol/L      Potassium 3.3 mmol/L      Chloride 101 mmol/L      CO2 18.0 mmol/L      Calcium 8.3 mg/dL      Total Protein 6.5 g/dL      Albumin 2.60 g/dL      ALT (SGPT) 41 U/L      AST (SGOT) 26 U/L      Alkaline Phosphatase 235 U/L      Total Bilirubin 0.5 mg/dL      eGFR Non African Amer 16 mL/min/1.73      Globulin 3.9 gm/dL      A/G Ratio 0.7 g/dL      BUN/Creatinine Ratio 20.9     Anion Gap 16.0 mmol/L     Narrative:       GFR Normal >60  Chronic Kidney Disease <60  Kidney Failure <15      CK [[id number]]  (Abnormal) Collected:  07/12/20 0705    Specimen:  Blood Updated:  07/12/20 0746     Creatine Kinase 18 U/L     PTH, Intact [[id number]]  (Normal) Collected:  07/12/20 0705    Specimen:  Blood Updated:  07/12/20 0746     PTH, Intact 65.0 pg/mL     Narrative:       Results may be falsely decreased if patient taking Biotin.      CBC Auto Differential [[id number]]  (Abnormal) Collected:  07/12/20 0705    Specimen:  Blood Updated:  07/12/20 0732     WBC 17.00 10*3/mm3      RBC 3.30 10*6/mm3      Hemoglobin 11.0 g/dL      Hematocrit 31.3 %      MCV 94.8 fL      MCH 33.3 pg      MCHC 35.1 g/dL      RDW 14.2 %      RDW-SD 49.4 fl      MPV 10.7 fL      Platelets 260 10*3/mm3      Neutrophil % 84.5 %      Lymphocyte % 6.9 %      Monocyte % 6.0 %      Eosinophil % 0.1 %      Basophil % 0.4 %      Immature Grans % 2.1 %      Neutrophils, Absolute 14.37 10*3/mm3       Lymphocytes, Absolute 1.18 10*3/mm3      Monocytes, Absolute 1.02 10*3/mm3      Eosinophils, Absolute 0.01 10*3/mm3      Basophils, Absolute 0.07 10*3/mm3      Immature Grans, Absolute 0.35 10*3/mm3      nRBC 0.0 /100 WBC     Blood Culture ID, PCR - Blood, Hand, Right [[id number]]  (Abnormal) Collected:  07/11/20 1154    Specimen:  Blood from Hand, Right Updated:  07/12/20 0124     BCID, PCR Escherichia coli. Identification by BCID PCR.    Creatinine, Urine, Random - Urine, Clean Catch [[id number]] Collected:  07/11/20 1213    Specimen:  Urine, Clean Catch Updated:  07/11/20 2258     Creatinine, Urine 48.6 mg/dL     Narrative:       Reference intervals for random urine have not been established.  Clinical usage is dependent upon physician's interpretation in combination with other laboratory tests.       Osmolality, Urine - Urine, Clean Catch [[id number]]  (Abnormal) Collected:  07/11/20 1756    Specimen:  Urine, Clean Catch Updated:  07/11/20 1849     Osmolality, Urine 184 mOsm/kg     Protein, Urine, Random - Urine, Clean Catch [[id number]] Collected:  07/11/20 1756    Specimen:  Urine, Clean Catch Updated:  07/11/20 1838     Total Protein, Urine 15.4 mg/dL     Narrative:       Reference intervals for random urine have not been established.  Clinical usage is dependent upon physician's interpretation in combination with other laboratory tests.       T4, Free [[id number]]  (Normal) Collected:  07/11/20 1114    Specimen:  Blood Updated:  07/11/20 1740     Free T4 0.96 ng/dL     Narrative:       Results may be falsely increased if patient taking Biotin.      Uric Acid [[id number]]  (Abnormal) Collected:  07/11/20 1114    Specimen:  Blood Updated:  07/11/20 1720     Uric Acid 9.9 mg/dL     Magnesium [[id number]]  (Normal) Collected:  07/11/20 1114    Specimen:  Blood Updated:  07/11/20 1649     Magnesium 1.6 mg/dL     Phosphorus [[id number]]  (Abnormal) Collected:  07/11/20 1114    Specimen:  Blood Updated:  07/11/20 1649      Phosphorus 5.4 mg/dL     TSH [[id number]]  (Abnormal) Collected:  07/11/20 1114    Specimen:  Blood Updated:  07/11/20 1649     TSH 4.780 uIU/mL     Hemoglobin A1c [[id number]]  (Normal) Collected:  07/11/20 1114    Specimen:  Blood Updated:  07/11/20 1643     Hemoglobin A1C 5.50 %     Narrative:       Hemoglobin A1C Ranges:    Increased Risk for Diabetes  5.7% to 6.4%  Diabetes                     >= 6.5%  Diabetic Goal                < 7.0%    Blood Gas, Arterial [[id number]]  (Abnormal) Collected:  07/11/20 1628    Specimen:  Arterial Blood Updated:  07/11/20 1634     Site Right Radial     Héctor's Test Positive     pH, Arterial 7.433 pH units      pCO2, Arterial 26.9 mm Hg      Comment: 84 Value below reference range        pO2, Arterial 74.9 mm Hg      Comment: 84 Value below reference range        HCO3, Arterial 17.9 mmol/L      Comment: 84 Value below reference range        Base Excess, Arterial -5.2 mmol/L      Comment: 84 Value below reference range        O2 Saturation, Arterial 96.0 %      Temperature 37.0 C      Barometric Pressure for Blood Gas 748 mmHg      Modality Room Air     Ventilator Mode NA     Collected by [id number]     Comment: Meter: [id number]     :  [id number]        pCO2, Temperature Corrected 26.9 mm Hg      pH, Temp Corrected 7.433 pH Units      pO2, Temperature Corrected 74.9 mm Hg     Urinalysis, Microscopic Only - Urine, Clean Catch [[id number]]  (Abnormal) Collected:  07/11/20 1213    Specimen:  Urine, Clean Catch Updated:  07/11/20 1300     RBC, UA None Seen /HPF      WBC, UA Too Numerous to Count /HPF      Bacteria, UA 4+ /HPF      Squamous Epithelial Cells, UA None Seen /HPF      Methodology Automated Microscopy    Sodium, Urine, Random - Urine, Clean Catch [[id number]] Collected:  07/11/20 1213    Specimen:  Urine, Clean Catch Updated:  07/11/20 1258     Sodium, Urine 25 mmol/L     Narrative:       Reference intervals for random urine have not been established.  Clinical usage  is dependent upon physician's interpretation in combination with other laboratory tests.       COVID-19, [facility] IN-HOUSE,NP Swab (NO TRANSPORT MEDIA) 2 HR TAT - Swab, Nasopharynx [[id number]]  (Normal) Collected:  07/11/20 1215    Specimen:  Swab from Nasopharynx Updated:  07/11/20 1251     COVID19 Not Detected    Narrative:       Fact sheet for providers: https://www.fda.gov/media/151203/download     Fact sheet for patients: https://www.fda.gov/media/044630/download    Urinalysis With Culture If Indicated - Urine, Clean Catch [[id number]]  (Abnormal) Collected:  07/11/20 1213    Specimen:  Urine, Clean Catch Updated:  07/11/20 1247     Color, UA Yellow     Appearance, UA Turbid     pH, UA 5.5     Specific Gravity, UA <=1.005     Glucose, UA Negative     Ketones, UA Negative     Bilirubin, UA Negative     Blood, UA Moderate (2+)     Protein, UA 30 mg/dL (1+)     Leuk Esterase, UA Large (3+)     Nitrite, UA Negative     Urobilinogen, UA 0.2 E.U./dL    Lactic Acid, Plasma [[id number]]  (Normal) Collected:  07/11/20 1154    Specimen:  Blood Updated:  07/11/20 1221     Lactate 1.7 mmol/L     Comprehensive Metabolic Panel [[id number]]  (Abnormal) Collected:  07/11/20 1114    Specimen:  Blood Updated:  07/11/20 1138     Glucose 179 mg/dL      BUN 94 mg/dL      Creatinine 4.38 mg/dL      Sodium 129 mmol/L      Potassium 4.2 mmol/L      Chloride 95 mmol/L      CO2 18.0 mmol/L      Calcium 8.5 mg/dL      Total Protein 6.6 g/dL      Albumin 2.70 g/dL      ALT (SGPT) 48 U/L      AST (SGOT) 30 U/L      Alkaline Phosphatase 228 U/L      Total Bilirubin 0.6 mg/dL      eGFR Non African Amer 13 mL/min/1.73      Comment: <15 Indicative of kidney failure.        eGFR   Amer --     Comment: <15 Indicative of kidney failure.        Globulin 3.9 gm/dL      A/G Ratio 0.7 g/dL      BUN/Creatinine Ratio 21.5     Anion Gap 16.0 mmol/L     Narrative:       GFR Normal >60  Chronic Kidney Disease <60  Kidney Failure <15      CBC &  Differential [[id number]] Collected:  07/11/20 1114    Specimen:  Blood Updated:  07/11/20 1122    Narrative:       The following orders were created for panel order CBC & Differential.  Procedure                               Abnormality         Status                     ---------                               -----------         ------                     CBC Auto Differential[[id number]]        Abnormal            Final result                 Please view results for these tests on the individual orders.    CBC Auto Differential [[id number]]  (Abnormal) Collected:  07/11/20 1114    Specimen:  Blood Updated:  07/11/20 1122     WBC 19.49 10*3/mm3      RBC 3.43 10*6/mm3      Hemoglobin 11.1 g/dL      Hematocrit 33.7 %      MCV 98.3 fL      MCH 32.4 pg      MCHC 32.9 g/dL      RDW 14.6 %      RDW-SD 52.3 fl      MPV 10.5 fL      Platelets 291 10*3/mm3      Neutrophil % 84.7 %      Lymphocyte % 7.4 %      Monocyte % 4.7 %      Eosinophil % 0.1 %      Basophil % 0.4 %      Immature Grans % 2.7 %      Neutrophils, Absolute 16.51 10*3/mm3      Lymphocytes, Absolute 1.45 10*3/mm3      Monocytes, Absolute 0.91 10*3/mm3      Eosinophils, Absolute 0.02 10*3/mm3      Basophils, Absolute 0.07 10*3/mm3      Immature Grans, Absolute 0.53 10*3/mm3      nRBC 0.0 /100 WBC           No results found for: ECHOEFEST    Imaging Results (Last 72 Hours)     Procedure Component Value Units Date/Time    US Renal Bilateral [[id number]] Collected:  07/11/20 1755     Updated:  07/11/20 1800    Narrative:       EXAMINATION:  US RENAL BILATERAL-  7/11/2020 5:46 PM CDT     HISTORY: Acute renal insufficiency.      COMPARISON: No comparison study.     TECHNIQUE: Grayscale and color flow imaging were performed.     FINDINGS: The urinary bladder is decompressed. Bladder wall thickening  is likely an artifact of underdistention. The visualized abdominal aorta  and inferior vena cava are normal in caliber. The right kidney measures  13.4 cm and the left  kidney measures 13.6 cm. There is a 1.4 x 1.1 x 1.6  cm cyst in the upper to midpole region right kidney. The cortical  thickness and echogenicity are normal. There is no hydronephrosis.       Impression:       1. The renal size, cortical thickness and echogenicity are normal. No  hydronephrosis.  2. Small renal cyst on the right.  3. Bladder wall thickening is likely an artifact of underdistention.  This report was finalized on 07/11/2020 17:57 by Dr. Mikael Gallegos MD.    CT Abdomen Pelvis Without Contrast [[id number]] Collected:  07/11/20 1258     Updated:  07/11/20 1307    Narrative:       EXAMINATION:   CT ABDOMEN PELVIS WO CONTRAST-  7/11/2020 12:58 PM CDT     HISTORY: CT ABDOMEN PELVIS WO CONTRAST- 7/11/2020 12:33 PM CDT     HISTORY: Flank pain, recurrent stone disease suspected      COMPARISON: 05/27/2020      DOSE LENGTH PRODUCT: 235 mGy cm. Automated exposure control was also  utilized to decrease patient radiation dose.     TECHNIQUE: Noncontrast enhanced images of the abdomen and pelvis  obtained without oral contrast. Multiplanar reformatted images were  provided for review.      FINDINGS:   The lung bases and base of the heart are unremarkable.      LIVER: No focal liver lesion.      BILIARY SYSTEM: The gallbladder is unremarkable. No intrahepatic or  extrahepatic ductal dilatation.      PANCREAS: No focal pancreatic lesion.      SPLEEN: Unremarkable.      KIDNEYS AND ADRENALS: Adrenal glands are visualized. The renal contours  are normal in size shape and position. Vascular calcifications present  in the left renal hilum. Stranding around the right and left kidney is  noted..  The ureters are decompressed and normal in appearance.     RETROPERITONEUM: No mass, lymphadenopathy or hemorrhage.      GI TRACT: No evidence of obstruction or bowel wall thickening.  Diverticulosis is present..     OTHER: There is no mesenteric mass, lymphadenopathy or fluid collection.  The osseous structures and soft tissues  demonstrate no worrisome  lesions. Right hip prosthesis is present. Avascular necrosis the left  femoral head with associated collapse is also noted. Extensive vascular  calcifications present in the abdominal aorta and iliac arteries.      PELVIS: A right inguinal hernia is present. This contains small bowel.  There is no associated obstruction at this time.. The urinary bladder is  normal in appearance.       Impression:       1. Right inguinal hernia containing small bowel. The bowel is not  obstructed at this time.  2. Extensive vascular calcifications present in the abdominal aorta and  iliac arteries.  3. 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 Diverticulosis.         This report was finalized on 07/11/2020 13:04 by Dr. Donald Rhodes MD.        Assessment/Plan       Diarrhea    AMAYA (acute kidney injury) (CMS/HCC)    Avascular necrosis of femoral head, left (CMS/HCC)    Hyponatremia    Acute cystitis    Metabolic acidosis    Weight loss    E coli bacteremia    Iron deficiency anemia    Hypomagnesemia    Ventricular tachycardia, nonsustained (CMS/HCC)      Plan:   -recommend mag replacement to keep above 2.   -recommend overnight O2 oximetry to rule out sleep apnea for cause of NSVT.   -continue BB.   -monitor tele  -discussed with patient the need to follow routinely with a cardiologist due to his CAD history. We will follow up with him in 3 months after discharge.    Further orders per Dr. Farley    Thank you for asking us to follow this patient with you.     STERLING Brown  07/14/20  09:55    Please note this cardiology consultation note is the result of a face to face consultation with the patient, in addition to reviewing medical records at length by myself, STERLING Holloway      Time: approximately 45 minutes.

## 2020-07-14 NOTE — THERAPY TREATMENT NOTE
Acute Care - Occupational Therapy Treatment Note  [facility]     Patient Name: Ricardo Hugo  : 1950  MRN: [id number]  Today's Date: 2020  Onset of Illness/Injury or Date of Surgery: 20  Date of Referral to OT: 20  Referring Physician: Dr. Pineda    Admit Date: 2020       ICD-10-CM ICD-9-CM   1. AMAYA (acute kidney injury) (CMS/[facility]) N17.9 584.9   2. Hyponatremia E87.1 276.1   3. Diarrhea, unspecified type R19.7 787.91   4. Acute UTI (urinary tract infection) N39.0 599.0   5. Impaired mobility and ADLs Z74.09 V49.89    Z78.9    6. Impaired mobility Z74.09 799.89     Patient Active Problem List   Diagnosis   • Eustachian tube dysfunction   • Chronic rhinitis   • Asymmetrical sensorineural hearing loss   • History of adenomatous polyp of colon   • Atherosclerosis of native artery of both lower extremities with intermittent claudication (CMS/[facility])   • Accelerated hypertension   • Diarrhea of infectious origin   • Anxiety disorder   • Bilateral leg edema   • Ischemic colitis (CMS/[facility])   • Diarrhea   • AMAYA (acute kidney injury) (CMS/[facility])   • Avascular necrosis of femoral head, left (CMS/[facility])   • Hyponatremia   • Acute cystitis   • Metabolic acidosis   • Weight loss   • E coli bacteremia   • Iron deficiency anemia   • Hypomagnesemia   • Ventricular tachycardia, nonsustained (CMS/[facility])     Past Medical History:   Diagnosis Date   • Acid reflux    • Allergic rhinitis    • Chronic mucoid otitis media    • Chronic rhinitis    • Chronic sinusitis    • Coronary artery disease     HEART BYPASS    • Eustachian tube dysfunction    • Heart disease    • Hypertension    • Mixed hearing loss of left ear    • Perianal abscess    • Sensorineural hearing loss    • Tinnitus      Past Surgical History:   Procedure Laterality Date   • COLONOSCOPY N/A 2020    Procedure: COLONOSCOPY WITH ANESTHESIA;  Surgeon: Adrien Brewster MD;  Location: [facility] ENDOSCOPY;  Service: Gastroenterology;  Laterality:  N/A;  pre op: diarrhea  post op: polyps  PCP: Joe Velasco MD   • CORONARY ARTERY BYPASS GRAFT     • INCISION AND DRAINAGE PERIRECTAL ABSCESS N/A 3/3/2017    Procedure: INCISION AND DRAINAGE OF JEET ANAL ABSCESS;  Surgeon: Lynette Smith MD;  Location:  [facility] OR;  Service:    • MYRINGOTOMY W/ TUBES Left 04/17/2017    06/10/2016   • TONSILLECTOMY     • TOTAL HIP ARTHROPLASTY         Therapy Treatment    Rehabilitation Treatment Summary     Row Name 07/14/20 0830             Treatment Time/Intention    Discipline  occupational therapy assistant  -CJ      Document Type  therapy note (daily note)  -CJ      Subjective Information  complains of;weakness;fatigue  -CJ      Mode of Treatment  occupational therapy  -CJ      Patient Effort  good  -CJ      Existing Precautions/Restrictions  fall  -CJ      Recorded by [CJ] Francisco Castro COTA/L 07/14/20 1117      Row Name 07/14/20 0830             Bed Mobility Assessment/Treatment    Bed Mobility Assessment/Treatment  bed mobility (all) activities  -CJ      [place] Level (Bed Mobility)  independent;supervision  -CJ      Recorded by [CJ] Francisco Castro COTA/L 07/14/20 1117      Row Name 07/14/20 0830             Functional Mobility    Functional Mobility- Ind. Level  conditional independence;supervision required  -CJ      Functional Mobility-Distance (Feet)  25  -CJ      Recorded by [CJ] Francisco Castro COTA/L 07/14/20 1117      Row Name 07/14/20 0830             Sit-Stand Transfer    Sit-Stand [place] (Transfers)  independent;supervision  -CJ      Recorded by [CJ] Francisco Castro COTA/L 07/14/20 1117      Row Name 07/14/20 0830             Stand-Sit Transfer    Stand-Sit [place] (Transfers)  independent;supervision  -CJ      Recorded by [CJ] Francisco Castro CARLISLE/L 07/14/20 1117      Row Name 07/14/20 0830             ADL Assessment/Intervention    37631 - OT Self Care/Mgmt Minutes  41  -CJ      BADL Assessment/Intervention   bathing;upper body dressing;lower body dressing;grooming  -CJ      Recorded by [CJ] Francisco Castro COTA/L 07/14/20 1117      Row Name 07/14/20 0830             Bathing Assessment/Intervention    Bathing [place] Level  bathing skills;conditional independence;set up;supervision  -CJ      Assistive Devices (Bathing)  bath mitt;grab bars/tub rail;hand-held shower spray hose;shower chair  -CJ      Bathing Position  supported sitting;supported standing  -CJ      Recorded by [CJ] Francisco Castro COTA/L 07/14/20 1117      Row Name 07/14/20 0830             Upper Body Dressing Assessment/Training    Upper Body Dressing [place] Level  doff;don;front opening garment;set up;contact guard assist  -CJ      Upper Body Dressing Position  supported sitting;supported standing  -CJ      Recorded by [CJ] Francisco Castro COTA/L 07/14/20 1117      Row Name 07/14/20 0830             Lower Body Dressing Assessment/Training    Lower Body Dressing [place] Level  doff;don;socks;set up;contact guard assist  -CJ      Lower Body Dressing Position  unsupported sitting  -CJ      Recorded by [CJ] Francisco Castro COTA/L 07/14/20 1117      Row Name 07/14/20 0830             Grooming Assessment/Training    [place] Level (Grooming)  hair care, combing/brushing;independent  -CJ      Grooming Position  unsupported standing  -CJ      Recorded by [CJ] Francisco Castro COTA/L 07/14/20 1119      Row Name 07/14/20 0830             Positioning and Restraints    Pre-Treatment Position  in bed  -CJ      Post Treatment Position  bed  -CJ      In Bed  sitting EOB;call light within reach;encouraged to call for assist;side rails up x1  -CJ      Recorded by [CJ] Francisco Castro COTA/L 07/14/20 1117      Row Name 07/14/20 0830             Pain Assessment    Additional Documentation  Pain Scale 2: Word Pre/Post-Treatment (Group)  -CJ      Recorded by [CJ] Francisco Castro COTA/L 07/14/20 1117      Row Name 07/14/20 0830              Pain Scale: Numbers Pre/Post-Treatment    Pain Scale: Numbers, Pretreatment  0/10 - no pain  -      Pain Scale: Numbers, Post-Treatment  0/10 - no pain  -      Pain Intervention(s)  Repositioned;Rest;Shower  -      Recorded by [CJ] Francisco Castro COTA/L 07/14/20 1117      Row Name 07/14/20 0830             Outcome Summary/Treatment Plan (OT)    Daily Summary of Progress (OT)  progress toward functional goals is good  -      Barriers to Overall Progress (OT)  Fall!  -      Plan for Continued Treatment (OT)  continue with ot poc!  -      Recorded by [CJ] Francisco Castro COTA/NIK 07/14/20 1117        User Key  (r) = Recorded By, (t) = Taken By, (c) = Cosigned By    Initials Name Effective Dates Discipline     Francisco Castro COTA/L 08/02/16 -  OT             Occupational Therapy Education                 Title: PT OT SLP Therapies (In Progress)     Topic: Occupational Therapy (In Progress)     Point: ADL training (Done)     Description:   Instruct learner(s) on proper safety adaptation and remediation techniques during self care or transfers.   Instruct in proper use of assistive devices.              Learning Progress Summary           Patient Acceptance, E,TB, VU,DU,NR by  at 7/14/2020 1117    Comment:  Pt. performed adl bathing/dressing!    Acceptance, E,TB, VU,DU,NR by  at 7/13/2020 1127    Comment:  Pt. performed adl bathing/dressing adls'!    Acceptance, E, VU by  at 7/12/2020 1459    Comment:  OT role, benefits, POC, d/c planning, home safety, endurance techniques                   Point: Home exercise program (Not Started)     Description:   Instruct learner(s) on appropriate technique for monitoring, assisting and/or progressing therapeutic exercises/activities.              Learner Progress:   Not documented in this visit.          Point: Precautions (Done)     Description:   Instruct learner(s) on prescribed precautions during self-care and functional transfers.               Learning Progress Summary           Patient Acceptance, E,TB, VU,DU,NR by  at 7/14/2020 1117    Comment:  Pt. performed adl bathing/dressing!    Acceptance, E,TB, VU,DU,NR by  at 7/13/2020 1127    Comment:  Pt. performed adl bathing/dressing adls'!    Acceptance, E, VU by  at 7/12/2020 1459    Comment:  OT role, benefits, POC, d/c planning, home safety, endurance techniques                   Point: Body mechanics (Done)     Description:   Instruct learner(s) on proper positioning and spine alignment during self-care, functional mobility activities and/or exercises.              Learning Progress Summary           Patient Acceptance, E,TB, VU,DU,NR by  at 7/14/2020 1117    Comment:  Pt. performed adl bathing/dressing!    Acceptance, E,TB, VU,DU,NR by  at 7/13/2020 1127    Comment:  Pt. performed adl bathing/dressing adls'!    Acceptance, E, VU by  at 7/12/2020 1459    Comment:  OT role, benefits, POC, d/c planning, home safety, endurance techniques                               User Key     Initials Effective Dates Name Provider Type Discipline     08/02/16 -  Francisco Castro COTA/L Occupational Therapy Assistant OT     07/05/20 -  Gaurang Emmanuel, OTR/L Occupational Therapist OT                OT Recommendation and Plan  Outcome Summary/Treatment Plan (OT)  Daily Summary of Progress (OT): progress toward functional goals is good  Barriers to Overall Progress (OT): Fall!  Plan for Continued Treatment (OT): continue with ot poc!  Daily Summary of Progress (OT): progress toward functional goals is good  Plan of Care Review  Plan of Care Reviewed With: patient  Plan of Care Reviewed With: patient  Outcome Measures     Row Name 07/14/20 0830 07/13/20 0855 07/12/20 1400       How much help from another is currently needed...    Putting on and taking off regular lower body clothing?  3  -  3  -  2  -MM    Bathing (including washing, rinsing, and drying)  4  -  4  -  2  -MM    Toileting  (which includes using toilet bed pan or urinal)  3  -CJ  3  -CJ  3  -MM    Putting on and taking off regular upper body clothing  4  -CJ  4  -CJ  4  -MM    Taking care of personal grooming (such as brushing teeth)  4  -CJ  4  -CJ  4  -MM    Eating meals  4  -CJ  4  -CJ  4  -MM    AM-PAC 6 Clicks Score (OT)  22  -  22  -CJ  19  -MM       Functional Assessment    Outcome Measure Options  --  AM-PAC 6 Clicks Daily Activity (OT)  -CJ  AM-PAC 6 Clicks Daily Activity (OT)  -MM      User Key  (r) = Recorded By, (t) = Taken By, (c) = Cosigned By    Initials Name Provider Type     Francisco Castro, CARLISLE/L Occupational Therapy Assistant    MM Gaurang Emmanuel, OTR/L Occupational Therapist           Time Calculation:   Time Calculation- OT     Row Name 07/14/20 0830             Time Calculation- OT    OT Start Time  0830  -      OT Stop Time  0911  -      OT Time Calculation (min)  41 min  -      Total Timed Code Minutes- OT  41 minute(s)  -      TCU Minutes- OT  41 min  -      OT Received On  07/14/20  -      OT Goal Re-Cert Due Date  07/22/20  -         Timed Charges    27942 - OT Self Care/Mgmt Minutes  41  -        User Key  (r) = Recorded By, (t) = Taken By, (c) = Cosigned By    Initials Name Provider Type     Francisco Castro CARLISLE/L Occupational Therapy Assistant        Therapy Charges for Today     Code Description Service Date Service Provider Modifiers Qty    93080960613 HC OT SELF CARE/MGMT/TRAIN EA 15 MIN 7/13/2020 Francisco Castro COTA/L GO 3    84543220909 HC OT SELF CARE/MGMT/TRAIN EA 15 MIN 7/14/2020 Francisco Castro CARLISLE/L GO 3               MAYLIN Larios/NIK  7/14/2020

## 2020-07-14 NOTE — PLAN OF CARE
Problem: Patient Care Overview  Goal: Plan of Care Review  Outcome: Ongoing (interventions implemented as appropriate)  Flowsheets (Taken 7/14/2020 1118)  Progress: improving  Plan of Care Reviewed With: patient  Note:   Pt. Able to perform adl bathing/dressing with cga-supervision after set-up from this shane/l!

## 2020-07-14 NOTE — PLAN OF CARE
Problem: Patient Care Overview  Goal: Plan of Care Review  Outcome: Ongoing (interventions implemented as appropriate)  Flowsheets  Taken 7/14/2020 1746 by Crystal Aguilar, RN  Progress: improving  Outcome Summary: Pt having no c/o pain this shift, IVF infusing per orders, rocephin given per orders. had to get new IV this shift. Per nephrology, possibly home tomorrow or Thursday from their standpoint. Magnesium replaced this shift--recheck in AM. Voiding. VSS  Taken 7/14/2020 1118 by Francisco Castro COTA/L  Plan of Care Reviewed With: patient

## 2020-07-14 NOTE — PROGRESS NOTES
[facility] Medicine Services  INPATIENT PROGRESS NOTE    Length of Stay: 3  Date of Admission: 7/11/2020  Primary Care Physician: Joe Velasco MD    Subjective   Chief Complaint: Follow-up  HPI   Patient sitting up in bed.  He is working with occupational therapy and about to get in the shower.  He feels much better overall compared to admission.  He denies any chest pain or shortness of breath.  He denies palpitations.  He is eating and drinking well.  He denies urinary complaints.  He did not move his bowels at all yesterday and not thus far today.    Review of Systems   All pertinent negatives and positives are as above. All other systems have been reviewed and are negative unless otherwise stated.     Objective    Temp:  [97.5 °F (36.4 °C)-99.3 °F (37.4 °C)] 98.5 °F (36.9 °C)  Heart Rate:  [58-72] 58  Resp:  [16-18] 18  BP: (119-147)/(42-64) 119/42  Physical Exam  Constitutional: He is oriented to person, place, and time. He appears well-developed and well-nourished. No distress.   HENT:   Head: Normocephalic and atraumatic.   Neck: Normal range of motion. Neck supple. No JVD present. No tracheal deviation present.   Cardiovascular: Normal rate and intact distal pulses. Exam reveals no gallop and no friction rub.   Murmur heard.  Sinus 60-78.  Patient had 17 second run, 19 beats of NSVT overnight.  Pulmonary/Chest: Effort normal and breath sounds normal. He has no wheezes. He has no rales.   Abdominal: Soft. Bowel sounds are normal. He exhibits no distension. There is no tenderness.   Musculoskeletal: Normal range of motion. He exhibits no edema or tenderness.   Neurological: He is alert and oriented to person, place, and time. No cranial nerve deficit. Coordination normal.   Skin: Skin is warm and dry. No rash noted. No erythema.   Psychiatric: He has a normal mood and affect. His behavior is normal. Judgment and thought content normal.   Vitals reviewed    Results  Review:  I have reviewed the labs, radiology results, and diagnostic studies.    Laboratory Data:   Results from last 7 days   Lab Units 07/14/20  0231 07/13/20  0434 07/12/20  0705   WBC 10*3/mm3 12.07* 13.45* 17.00*   HEMOGLOBIN g/dL 8.9* 9.6* 11.0*   HEMATOCRIT % 26.3* 28.3* 31.3*   PLATELETS 10*3/mm3 219 234 260      Results from last 7 days   Lab Units 07/14/20  0231 07/13/20  0538 07/12/20  0705   SODIUM mmol/L 138 140 135*   POTASSIUM mmol/L 4.1 3.9 3.3*   CHLORIDE mmol/L 102 104 101   CO2 mmol/L 24.0 23.0 18.0*   BUN mg/dL 51* 64* 78*   CREATININE mg/dL 3.26* 3.50* 3.74*   CALCIUM mg/dL 8.1* 8.0* 8.3*   BILIRUBIN mg/dL 0.3 0.3 0.5   ALK PHOS U/L 163* 173* 235*   ALT (SGPT) U/L 31 31 41   AST (SGOT) U/L 25 22 26   GLUCOSE mg/dL 106* 100* 100*       Intake/Output Summary (Last 24 hours) at 7/14/2020 0933  Last data filed at 7/14/2020 0741  Gross per 24 hour   Intake 600 ml   Output 2450 ml   Net -1850 ml     I have reviewed the patient current medications.     Assessment/Plan     Active Hospital Problems    Diagnosis   • E coli bacteremia   • AMAYA (acute kidney injury) (CMS/[facility])   • Avascular necrosis of femoral head, left (CMS/[facility])   • Hyponatremia   • Acute cystitis   • Metabolic acidosis   • Hyperglycemia   • Weight loss   • Diarrhea     Plan:  1.  Patient admitted 7/11/2020 with complaints of generalized weakness.  Noted to have an acute kidney injury with BUN of 94 and creatinine of 4.3.    2.  No previously known history of chronic kidney disease.  All available previous laboratory studies show creatinine ranging from 0.7-1.3.   Renal function continues to improve, albeit slowly.  BUN 51, creatinine 3.26 today.  IV fluids changed to lactated Ringer's yesterday per nephrology.  Negative renal ultrasound.  Excellent urine output.  3.  Final blood and urine cultures show growth of E. coli, which are susceptible to Rocephin.  We will continue Rocephin today, and plan to convert to oral Omnicef likely  tomorrow.  Patient will need a total of 14 days of antibiotics from time of negative culture.  Repeat blood culture with RODRICK taken yesterday shows no growth at 24 hours.  White blood cell count improving.  4.  GI panel negative.  Protonix discontinued in favor of Pepcid.  Diarrhea improving with cholestyramine.  Patient had colonoscopy on 7/2 per Dr. Brewster.  This showed Hemosiderin staining in the distal colon, question resolving or ongoing colitis.  Patient was prescribed a Medrol Dose Pack.  Multiple polyps were removed, several which showed mild active inflammation on pathology.  5.  Cardiology has been consulted regarding 39 beats of nonsustained V. tach noted on telemetry overnight.  Patient reportedly was asleep during this episode without symptoms.  Appreciate their assistance.  6.  Continue PT/OT.   following for discharge planning.  7.  IV Venofer x3 doses for iron deficiency anemia.  8.  Orthopedic surgery planning to evaluate as an outpatient regarding avascular necrosis of the left femoral head seen on admission CT.  9.  Replace magnesium- Mag ox twice daily  10.  Labs in AM    Discharge Planning: I expect the patient to be discharged to home in 1-2 days.    Maria L Zambrano, STERLING   07/14/20   09:26

## 2020-07-14 NOTE — PLAN OF CARE
Problem: Patient Care Overview  Goal: Plan of Care Review  Outcome: Ongoing (interventions implemented as appropriate)  Flowsheets (Taken 7/14/2020 0310)  Progress: no change  Plan of Care Reviewed With: patient  Outcome Summary: Pt denies pain this shift. IVF infusing per orders. Up ad annalisa. Voiding per urinal. Runs of vtach per tele monitor room. Dr. Bee, cardiology, notified. Pt was asymptomatic, so no new orders at this time. Safety maintained. Will continue to monitor.

## 2020-07-15 ENCOUNTER — READMISSION MANAGEMENT (OUTPATIENT)
Dept: CALL CENTER | Facility: HOSPITAL | Age: 70
End: 2020-07-15

## 2020-07-15 VITALS
TEMPERATURE: 97.6 F | HEART RATE: 62 BPM | DIASTOLIC BLOOD PRESSURE: 50 MMHG | BODY MASS INDEX: 22.72 KG/M2 | OXYGEN SATURATION: 92 % | RESPIRATION RATE: 16 BRPM | SYSTOLIC BLOOD PRESSURE: 140 MMHG | WEIGHT: 167.77 LBS | HEIGHT: 72 IN

## 2020-07-15 LAB
ALBUMIN SERPL-MCNC: 2.4 G/DL (ref 3.5–5.2)
ALBUMIN/GLOB SERPL: 0.7 G/DL
ALP SERPL-CCNC: 178 U/L (ref 39–117)
ALT SERPL W P-5'-P-CCNC: 35 U/L (ref 1–41)
ANION GAP SERPL CALCULATED.3IONS-SCNC: 11 MMOL/L (ref 5–15)
AST SERPL-CCNC: 31 U/L (ref 1–40)
BASOPHILS # BLD AUTO: 0.06 10*3/MM3 (ref 0–0.2)
BASOPHILS NFR BLD AUTO: 0.7 % (ref 0–1.5)
BILIRUB SERPL-MCNC: 0.3 MG/DL (ref 0–1.2)
BUN SERPL-MCNC: 43 MG/DL (ref 8–23)
BUN/CREAT SERPL: 13.7 (ref 7–25)
CALCIUM SPEC-SCNC: 8.5 MG/DL (ref 8.6–10.5)
CHLORIDE SERPL-SCNC: 100 MMOL/L (ref 98–107)
CO2 SERPL-SCNC: 24 MMOL/L (ref 22–29)
CREAT SERPL-MCNC: 3.14 MG/DL (ref 0.76–1.27)
DEPRECATED RDW RBC AUTO: 51.3 FL (ref 37–54)
EOSINOPHIL # BLD AUTO: 0.08 10*3/MM3 (ref 0–0.4)
EOSINOPHIL NFR BLD AUTO: 0.9 % (ref 0.3–6.2)
ERYTHROCYTE [DISTWIDTH] IN BLOOD BY AUTOMATED COUNT: 14.1 % (ref 12.3–15.4)
GFR SERPL CREATININE-BSD FRML MDRD: 20 ML/MIN/1.73
GLOBULIN UR ELPH-MCNC: 3.5 GM/DL
GLUCOSE SERPL-MCNC: 116 MG/DL (ref 65–99)
HCT VFR BLD AUTO: 29.2 % (ref 37.5–51)
HGB BLD-MCNC: 9.5 G/DL (ref 13–17.7)
IMM GRANULOCYTES # BLD AUTO: 0.06 10*3/MM3 (ref 0–0.05)
IMM GRANULOCYTES NFR BLD AUTO: 0.7 % (ref 0–0.5)
LYMPHOCYTES # BLD AUTO: 1.28 10*3/MM3 (ref 0.7–3.1)
LYMPHOCYTES NFR BLD AUTO: 14.6 % (ref 19.6–45.3)
MAGNESIUM SERPL-MCNC: 2 MG/DL (ref 1.6–2.4)
MCH RBC QN AUTO: 32.1 PG (ref 26.6–33)
MCHC RBC AUTO-ENTMCNC: 32.5 G/DL (ref 31.5–35.7)
MCV RBC AUTO: 98.6 FL (ref 79–97)
MONOCYTES # BLD AUTO: 0.55 10*3/MM3 (ref 0.1–0.9)
MONOCYTES NFR BLD AUTO: 6.3 % (ref 5–12)
NEUTROPHILS NFR BLD AUTO: 6.76 10*3/MM3 (ref 1.7–7)
NEUTROPHILS NFR BLD AUTO: 76.8 % (ref 42.7–76)
NRBC BLD AUTO-RTO: 0 /100 WBC (ref 0–0.2)
PLATELET # BLD AUTO: 219 10*3/MM3 (ref 140–450)
PMV BLD AUTO: 10.5 FL (ref 6–12)
POTASSIUM SERPL-SCNC: 4 MMOL/L (ref 3.5–5.2)
PROT SERPL-MCNC: 5.9 G/DL (ref 6–8.5)
RBC # BLD AUTO: 2.96 10*6/MM3 (ref 4.14–5.8)
SODIUM SERPL-SCNC: 135 MMOL/L (ref 136–145)
WBC # BLD AUTO: 8.79 10*3/MM3 (ref 3.4–10.8)

## 2020-07-15 PROCEDURE — 97535 SELF CARE MNGMENT TRAINING: CPT

## 2020-07-15 PROCEDURE — 99232 SBSQ HOSP IP/OBS MODERATE 35: CPT | Performed by: NURSE PRACTITIONER

## 2020-07-15 PROCEDURE — 83735 ASSAY OF MAGNESIUM: CPT | Performed by: NURSE PRACTITIONER

## 2020-07-15 PROCEDURE — 80053 COMPREHEN METABOLIC PANEL: CPT | Performed by: NURSE PRACTITIONER

## 2020-07-15 PROCEDURE — 25010000002 IRON SUCROSE PER 1 MG: Performed by: NURSE PRACTITIONER

## 2020-07-15 PROCEDURE — 85025 COMPLETE CBC W/AUTO DIFF WBC: CPT | Performed by: NURSE PRACTITIONER

## 2020-07-15 RX ORDER — ALLOPURINOL 100 MG/1
100 TABLET ORAL DAILY
Qty: 30 TABLET | Refills: 2 | Status: ON HOLD | OUTPATIENT
Start: 2020-07-16 | End: 2020-08-21

## 2020-07-15 RX ORDER — CHOLESTYRAMINE 4 G/9G
1 POWDER, FOR SUSPENSION ORAL DAILY PRN
Qty: 60 EACH | Refills: 0 | Status: SHIPPED | OUTPATIENT
Start: 2020-07-15 | End: 2020-08-22 | Stop reason: HOSPADM

## 2020-07-15 RX ORDER — FAMOTIDINE 20 MG/1
20 TABLET, FILM COATED ORAL DAILY
Qty: 30 TABLET | Refills: 2 | Status: ON HOLD | OUTPATIENT
Start: 2020-07-16 | End: 2020-08-21

## 2020-07-15 RX ORDER — MELATONIN
2000 DAILY
Qty: 60 TABLET | Refills: 2 | Status: ON HOLD | OUTPATIENT
Start: 2020-07-16 | End: 2020-08-21 | Stop reason: SDDI

## 2020-07-15 RX ORDER — CEFDINIR 300 MG/1
300 CAPSULE ORAL DAILY
Qty: 12 CAPSULE | Refills: 0 | Status: SHIPPED | OUTPATIENT
Start: 2020-07-15 | End: 2020-07-27

## 2020-07-15 RX ORDER — FERROUS SULFATE 325(65) MG
325 TABLET ORAL
Start: 2020-07-15 | End: 2020-07-22

## 2020-07-15 RX ADMIN — ALLOPURINOL 100 MG: 100 TABLET ORAL at 08:08

## 2020-07-15 RX ADMIN — IRON SUCROSE 200 MG: 20 INJECTION, SOLUTION INTRAVENOUS at 08:08

## 2020-07-15 RX ADMIN — CLONIDINE HYDROCHLORIDE 0.2 MG: 0.2 TABLET ORAL at 06:20

## 2020-07-15 RX ADMIN — METOPROLOL SUCCINATE 25 MG: 25 TABLET, EXTENDED RELEASE ORAL at 08:08

## 2020-07-15 RX ADMIN — ASPIRIN 81 MG: 81 TABLET ORAL at 08:08

## 2020-07-15 RX ADMIN — FAMOTIDINE 20 MG: 20 TABLET, FILM COATED ORAL at 08:08

## 2020-07-15 RX ADMIN — Medication 250 MG: at 08:08

## 2020-07-15 RX ADMIN — VITAMIN D, TAB 1000IU (100/BT) 2000 UNITS: 25 TAB at 08:08

## 2020-07-15 RX ADMIN — CHOLESTYRAMINE 1 PACKET: 4 POWDER, FOR SUSPENSION ORAL at 08:08

## 2020-07-15 RX ADMIN — AMLODIPINE BESYLATE 10 MG: 10 TABLET ORAL at 08:08

## 2020-07-15 RX ADMIN — Medication 400 MG: at 08:08

## 2020-07-15 NOTE — DISCHARGE SUMMARY
[facility] Medicine Services  DISCHARGE SUMMARY       Date of Admission: 7/11/2020  Date of Discharge:  7/15/2020  Primary Care Physician: Joe Velasco MD    Presenting Problem/History of Present Illness:  Weakness    Final Discharge Diagnoses:  Active Hospital Problems    Diagnosis   • Iron deficiency anemia   • Hypomagnesemia   • Ventricular tachycardia, nonsustained (CMS/HCC)   • E coli bacteremia   • AMAYA (acute kidney injury) (CMS/HCC)   • Avascular necrosis of femoral head, left (CMS/HCC)   • Hyponatremia   • Acute cystitis   • Metabolic acidosis   • Weight loss   • Diarrhea     Consults:   1.  Nephrology  2.  Cardiology    Procedures Performed: None    Pertinent Test Results:   Lab Results (all)     Procedure Component Value Units Date/Time    Blood Culture With RODRICK - Blood, Arm, Left [[id number]] Collected:  07/13/20 0430    Specimen:  Blood from Arm, Left Updated:  07/15/20 0500     Blood Culture No growth at 2 days    Magnesium [[id number]]  (Normal) Collected:  07/15/20 0355    Specimen:  Blood Updated:  07/15/20 0452     Magnesium 2.0 mg/dL     Comprehensive Metabolic Panel [[id number]]  (Abnormal) Collected:  07/15/20 0355    Specimen:  Blood Updated:  07/15/20 0451     Glucose 116 mg/dL      BUN 43 mg/dL      Creatinine 3.14 mg/dL      Sodium 135 mmol/L      Potassium 4.0 mmol/L      Chloride 100 mmol/L      CO2 24.0 mmol/L      Calcium 8.5 mg/dL      Total Protein 5.9 g/dL      Albumin 2.40 g/dL      ALT (SGPT) 35 U/L      AST (SGOT) 31 U/L      Alkaline Phosphatase 178 U/L      Total Bilirubin 0.3 mg/dL      eGFR Non African Amer 20 mL/min/1.73      Globulin 3.5 gm/dL      A/G Ratio 0.7 g/dL      BUN/Creatinine Ratio 13.7     Anion Gap 11.0 mmol/L     CBC Auto Differential [[id number]]  (Abnormal) Collected:  07/15/20 0355    Specimen:  Blood Updated:  07/15/20 0410     WBC 8.79 10*3/mm3      RBC 2.96 10*6/mm3      Hemoglobin 9.5 g/dL      Hematocrit 29.2 %       MCV 98.6 fL      MCH 32.1 pg      MCHC 32.5 g/dL      RDW 14.1 %      RDW-SD 51.3 fl      MPV 10.5 fL      Platelets 219 10*3/mm3      Neutrophil % 76.8 %      Lymphocyte % 14.6 %      Monocyte % 6.3 %      Eosinophil % 0.9 %      Basophil % 0.7 %      Immature Grans % 0.7 %      Neutrophils, Absolute 6.76 10*3/mm3      Lymphocytes, Absolute 1.28 10*3/mm3      Monocytes, Absolute 0.55 10*3/mm3      Eosinophils, Absolute 0.08 10*3/mm3      Basophils, Absolute 0.06 10*3/mm3      Immature Grans, Absolute 0.06 10*3/mm3      nRBC 0.0 /100 WBC     Blood Culture - Blood, Arm, Right [[id number]]  (Abnormal) Collected:  07/11/20 1211    Specimen:  Blood from Arm, Right Updated:  07/14/20 0521     Blood Culture Escherichia coli     Comment: Refer to previous blood culture collected on 7/11/2020 1254 for MICs.        Isolated from Anaerobic Bottle     Gram Stain Anaerobic Bottle Gram negative bacilli    Blood Culture - Blood, Hand, Right [[id number]]  (Abnormal)  (Susceptibility) Collected:  07/11/20 1154    Specimen:  Blood from Hand, Right Updated:  07/14/20 0521     Blood Culture Escherichia coli     Isolated from Anaerobic Bottle     Gram Stain Anaerobic Bottle Gram negative bacilli    Susceptibility      Escherichia coli     ASPEN     Ampicillin Resistant     Ampicillin + Sulbactam Resistant     Cefepime Susceptible     Ceftazidime Susceptible     Ceftriaxone Susceptible     Gentamicin Susceptible     Levofloxacin Susceptible     Piperacillin + Tazobactam Susceptible     Trimethoprim + Sulfamethoxazole Susceptible                Susceptibility Comments     Escherichia coli    Cefazolin sensitivity will not be reported for Enterobacteriaceae in non-urine isolates. If cefazolin is preferred, please call the microbiology lab to request an E-test.  With the exception of urinary-sourced infections, aminoglycosides should not be used as monotherapy.             Comprehensive Metabolic Panel [[id number]]  (Abnormal) Collected:   07/14/20 0231    Specimen:  Blood Updated:  07/14/20 0339     Glucose 106 mg/dL      BUN 51 mg/dL      Creatinine 3.26 mg/dL      Sodium 138 mmol/L      Potassium 4.1 mmol/L      Chloride 102 mmol/L      CO2 24.0 mmol/L      Calcium 8.1 mg/dL      Total Protein 5.7 g/dL      Albumin 2.40 g/dL      ALT (SGPT) 31 U/L      AST (SGOT) 25 U/L      Alkaline Phosphatase 163 U/L      Total Bilirubin 0.3 mg/dL      eGFR Non African Amer 19 mL/min/1.73      Globulin 3.3 gm/dL      A/G Ratio 0.7 g/dL      BUN/Creatinine Ratio 15.6     Anion Gap 12.0 mmol/L     Magnesium [[id number]]  (Abnormal) Collected:  07/14/20 0231    Specimen:  Blood Updated:  07/14/20 0339     Magnesium 1.3 mg/dL     CBC Auto Differential [[id number]]  (Abnormal) Collected:  07/14/20 0231    Specimen:  Blood Updated:  07/14/20 0316     WBC 12.07 10*3/mm3      RBC 2.72 10*6/mm3      Hemoglobin 8.9 g/dL      Hematocrit 26.3 %      MCV 96.7 fL      MCH 32.7 pg      MCHC 33.8 g/dL      RDW 14.2 %      RDW-SD 50.3 fl      MPV 10.7 fL      Platelets 219 10*3/mm3      Neutrophil % 83.3 %      Lymphocyte % 8.8 %      Monocyte % 6.0 %      Eosinophil % 0.6 %      Basophil % 0.4 %      Immature Grans % 0.9 %      Neutrophils, Absolute 10.05 10*3/mm3      Lymphocytes, Absolute 1.06 10*3/mm3      Monocytes, Absolute 0.73 10*3/mm3      Eosinophils, Absolute 0.07 10*3/mm3      Basophils, Absolute 0.05 10*3/mm3      Immature Grans, Absolute 0.11 10*3/mm3      nRBC 0.0 /100 WBC     Iron Profile [[id number]]  (Abnormal) Collected:  07/13/20 0538    Specimen:  Blood Updated:  07/13/20 1708     Iron 32 mcg/dL      Iron Saturation 19 %      Transferrin 116 mg/dL      TIBC 173 mcg/dL     Comprehensive Metabolic Panel [[id number]]  (Abnormal) Collected:  07/13/20 0538    Specimen:  Blood Updated:  07/13/20 0644     Glucose 100 mg/dL      BUN 64 mg/dL      Creatinine 3.50 mg/dL      Sodium 140 mmol/L      Potassium 3.9 mmol/L      Chloride 104 mmol/L      CO2 23.0 mmol/L       Calcium 8.0 mg/dL      Total Protein 5.7 g/dL      Albumin 2.40 g/dL      ALT (SGPT) 31 U/L      AST (SGOT) 22 U/L      Alkaline Phosphatase 173 U/L      Total Bilirubin 0.3 mg/dL      eGFR Non African Amer 17 mL/min/1.73      Globulin 3.3 gm/dL      A/G Ratio 0.7 g/dL      BUN/Creatinine Ratio 18.3     Anion Gap 13.0 mmol/L     CBC Auto Differential [[id number]]  (Abnormal) Collected:  07/13/20 0434    Specimen:  Blood Updated:  07/13/20 0457     WBC 13.45 10*3/mm3      RBC 2.97 10*6/mm3      Hemoglobin 9.6 g/dL      Hematocrit 28.3 %      MCV 95.3 fL      MCH 32.3 pg      MCHC 33.9 g/dL      RDW 14.3 %      RDW-SD 50.2 fl      MPV 10.4 fL      Platelets 234 10*3/mm3      Neutrophil % 82.2 %      Lymphocyte % 9.4 %      Monocyte % 6.2 %      Eosinophil % 0.4 %      Basophil % 0.5 %      Immature Grans % 1.3 %      Neutrophils, Absolute 11.06 10*3/mm3      Lymphocytes, Absolute 1.27 10*3/mm3      Monocytes, Absolute 0.83 10*3/mm3      Eosinophils, Absolute 0.05 10*3/mm3      Basophils, Absolute 0.07 10*3/mm3      Immature Grans, Absolute 0.17 10*3/mm3      nRBC 0.0 /100 WBC     Urine Culture - Urine, Urine, Clean Catch [[id number]]  (Abnormal)  (Susceptibility) Collected:  07/11/20 1213    Specimen:  Urine, Clean Catch Updated:  07/13/20 0301     Urine Culture >100,000 CFU/mL Escherichia coli    Susceptibility      Escherichia coli     ASPEN     Ampicillin Resistant     Ampicillin + Sulbactam Resistant     Cefazolin Susceptible     Cefepime Susceptible     Ceftazidime Susceptible     Ceftriaxone Susceptible     Gentamicin Susceptible     Levofloxacin Susceptible     Nitrofurantoin Susceptible     Piperacillin + Tazobactam Susceptible     Tetracycline Susceptible     Trimethoprim + Sulfamethoxazole Susceptible                    Vitamin D 25 Hydroxy [[id number]]  (Abnormal) Collected:  07/12/20 0705    Specimen:  Blood Updated:  07/12/20 2207     25 Hydroxy, Vitamin D 23.5 ng/ml     Eosinophil Smear - Urine, Urine, Clean  Catch [[id number]]  (Abnormal) Collected:  07/11/20 1756    Specimen:  Urine, Clean Catch Updated:  07/12/20 1257     Eosinophil Smear 2 % EOS/100 Cells     Urea Nitrogen, Urine - Urine, Clean Catch [[id number]] Collected:  07/11/20 1756    Specimen:  Urine, Clean Catch Updated:  07/12/20 1212     Urea Nitrogen, Urine 221 mg/dL     Narrative:       Reference intervals for random urine have not been established.  Clinical usage is dependent upon physician's interpretation in combination with other laboratory tests.       Creatinine, Urine, Random - Urine, Clean Catch [[id number]] Collected:  07/11/20 1756    Specimen:  Urine, Clean Catch Updated:  07/12/20 1212     Creatinine, Urine 18.3 mg/dL     Gastrointestinal Panel, PCR - Stool, Per Rectum [[id number]]  (Normal) Collected:  07/12/20 0742    Specimen:  Stool from Per Rectum Updated:  07/12/20 0902     Campylobacter Not Detected     Plesiomonas shigelloides Not Detected     Salmonella Not Detected     Vibrio Not Detected     Vibrio cholerae Not Detected     Yersinia enterocolitica Not Detected     Enteroaggregative E. coli (EAEC) Not Detected     Enteropathogenic E. coli (EPEC) Not Detected     Enterotoxigenic E. coli (ETEC) lt/st Not Detected     Shiga-like toxin-producing E. coli (STEC) stx1/stx2 Not Detected     E. coli O157 Not Detected     Shigella/Enteroinvasive E. coli (EIEC) Not Detected     Cryptosporidium Not Detected     Cyclospora cayetanensis Not Detected     Entamoeba histolytica Not Detected     Giardia lamblia Not Detected     Adenovirus F40/41 Not Detected     Astrovirus Not Detected     Norovirus GI/GII Not Detected     Rotavirus A Not Detected     Sapovirus (I, II, IV or V) Not Detected    Narrative:       If Aeromonas, Staphylococcus aureus or Bacillus cereus are suspected, please order [id number]: Stool Culture, Aeromonas, S aureus, B Cereus.    Comprehensive Metabolic Panel [[id number]]  (Abnormal) Collected:  07/12/20 0705    Specimen:  Blood  Updated:  07/12/20 0746     Glucose 100 mg/dL      BUN 78 mg/dL      Creatinine 3.74 mg/dL      Sodium 135 mmol/L      Potassium 3.3 mmol/L      Chloride 101 mmol/L      CO2 18.0 mmol/L      Calcium 8.3 mg/dL      Total Protein 6.5 g/dL      Albumin 2.60 g/dL      ALT (SGPT) 41 U/L      AST (SGOT) 26 U/L      Alkaline Phosphatase 235 U/L      Total Bilirubin 0.5 mg/dL      eGFR Non African Amer 16 mL/min/1.73      Globulin 3.9 gm/dL      A/G Ratio 0.7 g/dL      BUN/Creatinine Ratio 20.9     Anion Gap 16.0 mmol/L     CK [[id number]]  (Abnormal) Collected:  07/12/20 0705    Specimen:  Blood Updated:  07/12/20 0746     Creatine Kinase 18 U/L     PTH, Intact [[id number]]  (Normal) Collected:  07/12/20 0705    Specimen:  Blood Updated:  07/12/20 0746     PTH, Intact 65.0 pg/mL     Narrative:       Results may be falsely decreased if patient taking Biotin.      CBC Auto Differential [[id number]]  (Abnormal) Collected:  07/12/20 0705    Specimen:  Blood Updated:  07/12/20 0732     WBC 17.00 10*3/mm3      RBC 3.30 10*6/mm3      Hemoglobin 11.0 g/dL      Hematocrit 31.3 %      MCV 94.8 fL      MCH 33.3 pg      MCHC 35.1 g/dL      RDW 14.2 %      RDW-SD 49.4 fl      MPV 10.7 fL      Platelets 260 10*3/mm3      Neutrophil % 84.5 %      Lymphocyte % 6.9 %      Monocyte % 6.0 %      Eosinophil % 0.1 %      Basophil % 0.4 %      Immature Grans % 2.1 %      Neutrophils, Absolute 14.37 10*3/mm3      Lymphocytes, Absolute 1.18 10*3/mm3      Monocytes, Absolute 1.02 10*3/mm3      Eosinophils, Absolute 0.01 10*3/mm3      Basophils, Absolute 0.07 10*3/mm3      Immature Grans, Absolute 0.35 10*3/mm3      nRBC 0.0 /100 WBC     Blood Culture ID, PCR - Blood, Hand, Right [[id number]]  (Abnormal) Collected:  07/11/20 1154    Specimen:  Blood from Hand, Right Updated:  07/12/20 0124     BCID, PCR Escherichia coli. Identification by BCID PCR.    Creatinine, Urine, Random - Urine, Clean Catch [[id number]] Collected:  07/11/20 1213    Specimen:   Urine, Clean Catch Updated:  07/11/20 2258     Creatinine, Urine 48.6 mg/dL     Narrative:       Reference intervals for random urine have not been established.  Clinical usage is dependent upon physician's interpretation in combination with other laboratory tests.       Osmolality, Urine - Urine, Clean Catch [[id number]]  (Abnormal) Collected:  07/11/20 1756    Specimen:  Urine, Clean Catch Updated:  07/11/20 1849     Osmolality, Urine 184 mOsm/kg     Protein, Urine, Random - Urine, Clean Catch [[id number]] Collected:  07/11/20 1756    Specimen:  Urine, Clean Catch Updated:  07/11/20 1838     Total Protein, Urine 15.4 mg/dL     T4, Free [[id number]]  (Normal) Collected:  07/11/20 1114    Specimen:  Blood Updated:  07/11/20 1740     Free T4 0.96 ng/dL     Narrative:       Results may be falsely increased if patient taking Biotin.      Uric Acid [[id number]]  (Abnormal) Collected:  07/11/20 1114    Specimen:  Blood Updated:  07/11/20 1720     Uric Acid 9.9 mg/dL     Magnesium [[id number]]  (Normal) Collected:  07/11/20 1114    Specimen:  Blood Updated:  07/11/20 1649     Magnesium 1.6 mg/dL     Phosphorus [[id number]]  (Abnormal) Collected:  07/11/20 1114    Specimen:  Blood Updated:  07/11/20 1649     Phosphorus 5.4 mg/dL     TSH [[id number]]  (Abnormal) Collected:  07/11/20 1114    Specimen:  Blood Updated:  07/11/20 1649     TSH 4.780 uIU/mL     Hemoglobin A1c [[id number]]  (Normal) Collected:  07/11/20 1114    Specimen:  Blood Updated:  07/11/20 1643     Hemoglobin A1C 5.50 %     Blood Gas, Arterial [[id number]]  (Abnormal) Collected:  07/11/20 1628    Specimen:  Arterial Blood Updated:  07/11/20 1634     Site Right Radial     Héctor's Test Positive     pH, Arterial 7.433 pH units      pCO2, Arterial 26.9 mm Hg      Comment: 84 Value below reference range        pO2, Arterial 74.9 mm Hg      Comment: 84 Value below reference range        HCO3, Arterial 17.9 mmol/L      Comment: 84 Value below reference range        Base  Excess, Arterial -5.2 mmol/L      Comment: 84 Value below reference range        O2 Saturation, Arterial 96.0 %      Temperature 37.0 C      Barometric Pressure for Blood Gas 748 mmHg      Modality Room Air     Ventilator Mode NA     Collected by [id number]     Comment: Meter: [id number]     :  [id number]        pCO2, Temperature Corrected 26.9 mm Hg      pH, Temp Corrected 7.433 pH Units      pO2, Temperature Corrected 74.9 mm Hg     Urinalysis, Microscopic Only - Urine, Clean Catch [[id number]]  (Abnormal) Collected:  07/11/20 1213    Specimen:  Urine, Clean Catch Updated:  07/11/20 1300     RBC, UA None Seen /HPF      WBC, UA Too Numerous to Count /HPF      Bacteria, UA 4+ /HPF      Squamous Epithelial Cells, UA None Seen /HPF      Methodology Automated Microscopy    Sodium, Urine, Random - Urine, Clean Catch [[id number]] Collected:  07/11/20 1213    Specimen:  Urine, Clean Catch Updated:  07/11/20 1258     Sodium, Urine 25 mmol/L     COVID-19, [facility]OTT IN-HOUSE,NP Swab (NO TRANSPORT MEDIA) 2 HR TAT - Swab, Nasopharynx [[id number]]  (Normal) Collected:  07/11/20 1215    Specimen:  Swab from Nasopharynx Updated:  07/11/20 1251     COVID19 Not Detected    Urinalysis With Culture If Indicated - Urine, Clean Catch [[id number]]  (Abnormal) Collected:  07/11/20 1213    Specimen:  Urine, Clean Catch Updated:  07/11/20 1247     Color, UA Yellow     Appearance, UA Turbid     pH, UA 5.5     Specific Gravity, UA <=1.005     Glucose, UA Negative     Ketones, UA Negative     Bilirubin, UA Negative     Blood, UA Moderate (2+)     Protein, UA 30 mg/dL (1+)     Leuk Esterase, UA Large (3+)     Nitrite, UA Negative     Urobilinogen, UA 0.2 E.U./dL    Lactic Acid, Plasma [[id number]]  (Normal) Collected:  07/11/20 1154    Specimen:  Blood Updated:  07/11/20 1221     Lactate 1.7 mmol/L     Comprehensive Metabolic Panel [[id number]]  (Abnormal) Collected:  07/11/20 1114    Specimen:  Blood Updated:  07/11/20 1138     Glucose 179  mg/dL      BUN 94 mg/dL      Creatinine 4.38 mg/dL      Sodium 129 mmol/L      Potassium 4.2 mmol/L      Chloride 95 mmol/L      CO2 18.0 mmol/L      Calcium 8.5 mg/dL      Total Protein 6.6 g/dL      Albumin 2.70 g/dL      ALT (SGPT) 48 U/L      AST (SGOT) 30 U/L      Alkaline Phosphatase 228 U/L      Total Bilirubin 0.6 mg/dL      eGFR Non African Amer 13 mL/min/1.73      Comment: <15 Indicative of kidney failure.        eGFR   Amer --     Comment: <15 Indicative of kidney failure.        Globulin 3.9 gm/dL      A/G Ratio 0.7 g/dL      BUN/Creatinine Ratio 21.5     Anion Gap 16.0 mmol/L     CBC Auto Differential [[id number]]  (Abnormal) Collected:  07/11/20 1114    Specimen:  Blood Updated:  07/11/20 1122     WBC 19.49 10*3/mm3      RBC 3.43 10*6/mm3      Hemoglobin 11.1 g/dL      Hematocrit 33.7 %      MCV 98.3 fL      MCH 32.4 pg      MCHC 32.9 g/dL      RDW 14.6 %      RDW-SD 52.3 fl      MPV 10.5 fL      Platelets 291 10*3/mm3      Neutrophil % 84.7 %      Lymphocyte % 7.4 %      Monocyte % 4.7 %      Eosinophil % 0.1 %      Basophil % 0.4 %      Immature Grans % 2.7 %      Neutrophils, Absolute 16.51 10*3/mm3      Lymphocytes, Absolute 1.45 10*3/mm3      Monocytes, Absolute 0.91 10*3/mm3      Eosinophils, Absolute 0.02 10*3/mm3      Basophils, Absolute 0.07 10*3/mm3      Immature Grans, Absolute 0.53 10*3/mm3      nRBC 0.0 /100 WBC         Imaging Results (All)     Procedure Component Value Units Date/Time    US Renal Bilateral [[id number]] Collected:  07/11/20 1755     Updated:  07/11/20 1800    Narrative:       EXAMINATION:  US RENAL BILATERAL-  7/11/2020 5:46 PM CDT     HISTORY: Acute renal insufficiency.      COMPARISON: No comparison study.     TECHNIQUE: Grayscale and color flow imaging were performed.     FINDINGS: The urinary bladder is decompressed. Bladder wall thickening  is likely an artifact of underdistention. The visualized abdominal aorta  and inferior vena cava are normal in caliber. The  right kidney measures  13.4 cm and the left kidney measures 13.6 cm. There is a 1.4 x 1.1 x 1.6  cm cyst in the upper to midpole region right kidney. The cortical  thickness and echogenicity are normal. There is no hydronephrosis.       Impression:       1. The renal size, cortical thickness and echogenicity are normal. No  hydronephrosis.  2. Small renal cyst on the right.  3. Bladder wall thickening is likely an artifact of underdistention.  This report was finalized on 07/11/2020 17:57 by Dr. Mikael Gallegos MD.    CT Abdomen Pelvis Without Contrast [[id number]] Collected:  07/11/20 1258     Updated:  07/11/20 1307    Narrative:       EXAMINATION:   CT ABDOMEN PELVIS WO CONTRAST-  7/11/2020 12:58 PM CDT     HISTORY: CT ABDOMEN PELVIS WO CONTRAST- 7/11/2020 12:33 PM CDT     HISTORY: Flank pain, recurrent stone disease suspected      COMPARISON: 05/27/2020      DOSE LENGTH PRODUCT: 235 mGy cm. Automated exposure control was also  utilized to decrease patient radiation dose.     TECHNIQUE: Noncontrast enhanced images of the abdomen and pelvis  obtained without oral contrast. Multiplanar reformatted images were  provided for review.      FINDINGS:   The lung bases and base of the heart are unremarkable.      LIVER: No focal liver lesion.      BILIARY SYSTEM: The gallbladder is unremarkable. No intrahepatic or  extrahepatic ductal dilatation.      PANCREAS: No focal pancreatic lesion.      SPLEEN: Unremarkable.      KIDNEYS AND ADRENALS: Adrenal glands are visualized. The renal contours  are normal in size shape and position. Vascular calcifications present  in the left renal hilum. Stranding around the right and left kidney is  noted..  The ureters are decompressed and normal in appearance.     RETROPERITONEUM: No mass, lymphadenopathy or hemorrhage.      GI TRACT: No evidence of obstruction or bowel wall thickening.  Diverticulosis is present..     OTHER: There is no mesenteric mass, lymphadenopathy or fluid  collection.  The osseous structures and soft tissues demonstrate no worrisome  lesions. Right hip prosthesis is present. Avascular necrosis the left  femoral head with associated collapse is also noted. Extensive vascular  calcifications present in the abdominal aorta and iliac arteries.      PELVIS: A right inguinal hernia is present. This contains small bowel.  There is no associated obstruction at this time.. The urinary bladder is  normal in appearance.       Impression:       1. Right inguinal hernia containing small bowel. The bowel is not  obstructed at this time.  2. Extensive vascular calcifications present in the abdominal aorta and  iliac arteries.  3. 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 Diverticulosis.         This report was finalized on 07/11/2020 13:04 by Dr. Donald Rhodes MD.        Results for orders placed during the hospital encounter of 07/11/20   Adult Transthoracic Echo Complete W/ Cont if Necessary Per Protocol    Narrative · Mild mitral valve regurgitation is present  · Left ventricular wall thickness is consistent with hypertrophy.   Sigmoid-shaped ventricular septum is present.  · Mild tricuspid valve regurgitation is present.  · Left atrial cavity size is mildly dilated.  · Left ventricular diastolic dysfunction (grade II) consistent with   pseudonormalization.  · Mild aortic valve regurgitation is present.  · Left ventricular systolic function is normal.     NORMAL LV AND RV SYSTOLIC FUNCTION  GRADE 2 LV DIASTOLIC DYSFUNCTION  NO SIGNIFICANT VALVULAR DYSFUNCTION  MODERATE PULMONARY HTN       History of Present Illness on Day of Discharge: Patient states he is much better overall in comparison to admission.  He is eager for discharge home today.    Hospital Course:  Mr. Hugo is a pleasant 70-year-old  male who follows Dr. Joe Velasco for primary care.  He has a medical history significant for hypertension, coronary artery disease status post  coronary artery bypass grafting, and GERD.  The patient presented to the [facility] emergency department on 7/11/2020 with a 4-month progressive history of weakness.  The patient had had complaints of a poor appetite with a 7 pound weight loss during this timeframe.  He had diarrhea waxing and waning in intensity.  He underwent an outpatient colonoscopy on 7/2 per Dr. Brewster.  This showed hemosiderin staining in the distal colon, question resolving or ongoing colitis.  Multiple polyps were removed, several of which showed mild active inflammation on pathology.  Patient was prescribed Medrol Dosepak.  In the emergency department, patient was noted to have an acute kidney injury with BUN of 94 and creatinine of 4.38.  Sodium level is 129.  White blood cell count was 19,000.  Urinalysis showed large leuk esterase, too numerous to count white blood cells, and 4+ bacteria.  CT of the abdomen and pelvis did not show showed avascular necrosis of the left femoral head without any acute findings.  The patient was admitted to the hospitalist service for further evaluation and management.    No previously known history of chronic kidney disease.  All available previous laboratory studies showed a creatinine ranging from 0.7-1.3.  The patient was given IV fluids and evaluated by nephrology on admission.  His renal function has continued to improve daily, albeit slowly.  Today his creatinine is 3.1 and BUN 43.  His valsartan will continue to be held at time of discharge.  Blood pressure is presently well controlled.  This can be resumed on an outpatient basis if needed if his renal function continues to improve/stabilizes.  A renal ultrasound was performed, which was negative.  The patient has been cleared for discharge from a nephrology standpoint.    The patient was placed on Rocephin on admission.  Both urine and blood cultures showed growth of E. coli, which are susceptible to cephalosporin therapy.  A repeat  "blood culture obtained on 7/13 is negative thus far.  The patient will be converted to oral Omnicef to complete a total 14 days of antibiotic therapy from time of negative culture.  His white blood cell count has improved daily and has normalized as of today.    The patient has had a recent colonoscopy as described above for his diarrhea.  A GI panel was checked which was negative.  His Protonix was discontinued in favor of Pepcid.  We did trial cholestyramine, and the patient's diarrhea has seemed to resolve.  We will provide patient with a prescription for cholestyramine to use as needed.  He does have an upcoming appointment in the next 2 weeks with Dr. Brewster to discuss the possibility of Crohn's disease.    The patient was evaluated by cardiology on 7/14, as he did have a 39 beat run of nonsustained V. tach noted on telemetry.  Patient was asleep during this episode without symptoms.  A transthoracic echocardiogram was performed, which showed grade 2 diastolic dysfunction with EF of 61 to 65%.  This showed moderate pulmonary hypertension.  An overnight pulse oximetry was conducted, which was unremarkable.    Overall, the patient is hemodynamically stable and appropriate for discharge home today.  He was evaluated by physical and Occupational Therapy, who do not identify any needs for home health at this time.  The patient will follow-up with his primary care provider and with nephrology in 1 week.  We will have a BMP checked prior to these appointments.  He will follow-up with cardiology in 3 months.  He will follow-up with orthopedic surgery as an outpatient for evaluation of avascular necrosis noted on CT.    Condition on Discharge: Medically stable    Physical Exam on Discharge:  /50 (BP Location: Right arm, Patient Position: Lying)   Pulse 62   Temp 97.6 °F (36.4 °C) (Temporal)   Resp 16   Ht 182.9 cm (72.01\")   Wt 76.1 kg (167 lb 12.3 oz)   SpO2 92%   BMI 22.75 kg/m²   Physical " Exam  Constitutional: He is oriented to person, place, and time. He appears well-developed and well-nourished. No distress.   HENT:   Head: Normocephalic and atraumatic.   Neck: Normal range of motion. Neck supple. No JVD present. No tracheal deviation present.   Cardiovascular: Normal rate and intact distal pulses. Exam reveals no gallop and no friction rub.   Murmur heard.  Sinus/Sinus arrhythmia 50-74  Pulmonary/Chest: Effort normal and breath sounds normal. He has no wheezes. He has no rales.   Abdominal: Soft. Bowel sounds are normal. He exhibits no distension. There is no tenderness.   Musculoskeletal: Normal range of motion. He exhibits no edema or tenderness.   Neurological: He is alert and oriented to person, place, and time. No cranial nerve deficit. Coordination normal.   Skin: Skin is warm and dry. No rash noted. No erythema.   Psychiatric: He has a normal mood and affect. His behavior is normal. Judgment and thought content normal.   Vitals reviewed    Discharge Disposition:  Home or Self Care    Discharge Medications:     Discharge Medications      New Medications      Instructions Start Date   allopurinol 100 MG tablet  Commonly known as:  ZYLOPRIM   100 mg, Oral, Daily   Start Date:  July 16, 2020     cefdinir 300 MG capsule  Commonly known as:  OMNICEF   300 mg, Oral, Daily      cholecalciferol 25 MCG (1000 UT) tablet  Commonly known as:  VITAMIN D3   2,000 Units, Oral, Daily   Start Date:  July 16, 2020     cholestyramine 4 g packet  Commonly known as:  QUESTRAN   1 packet, Oral, Daily PRN      famotidine 20 MG tablet  Commonly known as:  PEPCID   20 mg, Oral, Daily   Start Date:  July 16, 2020     ferrous sulfate 325 (65 FE) MG tablet  Commonly known as:  FerrouSul   325 mg, Oral, Daily With Breakfast      magnesium oxide 400 (241.3 Mg) MG tablet tablet  Commonly known as:  MAGOX   400 mg, Oral, Daily         Continue These Medications      Instructions Start Date   albuterol sulfate  (90  Base) MCG/ACT inhaler  Commonly known as:  PROVENTIL HFA;VENTOLIN HFA;PROAIR HFA   2 puffs, Inhalation, 4 Times Daily PRN      amLODIPine 10 MG tablet  Commonly known as:  NORVASC   10 mg, Oral, Daily      ASPIR 81 MG EC tablet  Generic drug:  aspirin   81 mg, Oral, Daily      azelastine 0.1 % nasal spray  Commonly known as:  ASTELIN   1 spray, Nasal, 2 Times Daily PRN      cloNIDine 0.2 MG tablet  Commonly known as:  CATAPRES   0.2 mg, Oral, 3 Times Daily      desoximetasone 0.25 % cream  Commonly known as:  TOPICORT   1 application, Topical, 2 Times Daily      diphenoxylate-atropine 2.5-0.025 MG per tablet  Commonly known as:  LOMOTIL   1 tablet, Oral, 4 Times Daily PRN      fexofenadine 180 MG tablet  Commonly known as:  ALLEGRA   180 mg, Oral, Daily      metoprolol succinate XL 25 MG 24 hr tablet  Commonly known as:  TOPROL-XL   25 mg, Oral, Daily      PreserVision/Lutein capsule   1 capsule, Oral, 2 times daily         Stop These Medications    magnesium chloride  (64 Mg) MG CR tablet  Commonly known as:  MAG-DELAY     Omeprazole Magnesium 20.6 (20 Base) MG capsule delayed-release     valsartan 160 MG tablet  Commonly known as:  DIOVAN          Discharge Diet:   Diet Instructions     Diet: Cardiac, Renal; Thin      Discharge Diet:   Cardiac  Renal       Fluid Consistency:  Thin        Activity at Discharge:   Activity Instructions     Activity as Tolerated          Discharge Care Plan/Instructions:   1.  Return for any acute or worsening symptoms  2.  Medication changes as above  3.  Patient will complete course of Omnicef (total of 14 days of therapy) for E. coli bacteremia/urinary tract infection.  4.  Outpatient BMP in 1 week    Follow-up Appointments:   1.  Dr. Joe Velasco in 1 week  2.  Nephrology in 1 week  3.  Outpatient evaluation per orthopedic surgery for avascular necrosis of the left femoral head.  4.  Cardiology in 3 weeks  Future Appointments   Date Time Provider Department Center    7/30/2020  1:00 PM Adrien Brewster MD [facility] None   12/17/2020  2:45 PM Joe Velasco MD [facility]     Test Results Pending at Discharge: We will follow surveillance blood culture to completion.  No growth thus far.    STERLING Nina  07/15/20  12:03    Time: 45 minutes    I personally evaluated and examined the patient in conjunction with STERLING Moon and agree with the assessment, treatment plan, and disposition of the patient as recorded by her. My history, exam, and further recommendations are:     Patient seen and examined.  Has no acute complaints.  Says he feels well.  Says he wants to go home.    GEN: Alert, interactive, no acute distress  Lungs: Clear to auscultation bilaterally  Abdomen: Soft, nontender, nondistended, positive bowel sounds    Read with discharge summary as presented above.  Outpatient follow-up PCP, nephrology, cardiology.  Complete your full course of antibiotics.  Take all medications as prescribed.    Electronically signed by Dc Issa DO, 07/15/20, 1:49 PM.

## 2020-07-15 NOTE — PLAN OF CARE
Problem: Patient Care Overview  Goal: Plan of Care Review  Outcome: Ongoing (interventions implemented as appropriate)  Flowsheets (Taken 7/15/2020 1140)  Progress: improving  Plan of Care Reviewed With: patient  Note:   Pt. Performed adl bathing/dressing with set-up/supervision/cga for assist from this shane/l! Pt. Had gotten platelets earlier in the past evening and was weaker at this time, breathing was more labored!

## 2020-07-15 NOTE — PROGRESS NOTES
Continued Stay Note   [place]     Patient Name: Ricardo Hugo  MRN: [id number]  Today's Date: 7/15/2020    Admit Date: 7/11/2020    Discharge Plan     Row Name 07/15/20 1015       Plan    Plan  Home    Patient/Family in Agreement with Plan  yes    Final Discharge Disposition Code  01 - home or self-care    Final Note  SW checked with pt to see if pt wanted hh services.  Pt denies needing/wanting hh.  Pt plans to dc home with nn.  CARLITOS Jiang.        Discharge Codes    No documentation.             CARLITOS Booker

## 2020-07-15 NOTE — THERAPY TREATMENT NOTE
Acute Care - Occupational Therapy Treatment Note  [facility]     Patient Name: Ricardo Hugo  : 1950  MRN: [id number]  Today's Date: 7/15/2020  Onset of Illness/Injury or Date of Surgery: 20  Date of Referral to OT: 20  Referring Physician: Dr. Pineda    Admit Date: 2020       ICD-10-CM ICD-9-CM   1. AMAYA (acute kidney injury) (CMS/Formerly [facility]) N17.9 584.9   2. Hyponatremia E87.1 276.1   3. Diarrhea, unspecified type R19.7 787.91   4. Acute UTI (urinary tract infection) N39.0 599.0   5. Impaired mobility and ADLs Z74.09 V49.89    Z78.9    6. Impaired mobility Z74.09 799.89     Patient Active Problem List   Diagnosis   • Eustachian tube dysfunction   • Chronic rhinitis   • Asymmetrical sensorineural hearing loss   • History of adenomatous polyp of colon   • Atherosclerosis of native artery of both lower extremities with intermittent claudication (CMS/Formerly [facility])   • Accelerated hypertension   • Diarrhea of infectious origin   • Anxiety disorder   • Bilateral leg edema   • Ischemic colitis (CMS/Formerly [facility])   • Diarrhea   • AMAYA (acute kidney injury) (CMS/Formerly [facility])   • Avascular necrosis of femoral head, left (CMS/Formerly [facility])   • Hyponatremia   • Acute cystitis   • Metabolic acidosis   • Weight loss   • E coli bacteremia   • Iron deficiency anemia   • Hypomagnesemia   • Ventricular tachycardia, nonsustained (CMS/Formerly [facility])     Past Medical History:   Diagnosis Date   • Acid reflux    • Allergic rhinitis    • Chronic mucoid otitis media    • Chronic rhinitis    • Chronic sinusitis    • Coronary artery disease     HEART BYPASS    • Eustachian tube dysfunction    • Heart disease    • Hypertension    • Mixed hearing loss of left ear    • Perianal abscess    • Sensorineural hearing loss    • Tinnitus      Past Surgical History:   Procedure Laterality Date   • COLONOSCOPY N/A 2020    Procedure: COLONOSCOPY WITH ANESTHESIA;  Surgeon: Adrien Brewster MD;  Location: [facility] ENDOSCOPY;  Service: Gastroenterology;  Laterality:  N/A;  pre op: diarrhea  post op: polyps  PCP: Joe Velasco MD   • CORONARY ARTERY BYPASS GRAFT     • INCISION AND DRAINAGE PERIRECTAL ABSCESS N/A 3/3/2017    Procedure: INCISION AND DRAINAGE OF JEET ANAL ABSCESS;  Surgeon: Lynette Smith MD;  Location:  [facility] OR;  Service:    • MYRINGOTOMY W/ TUBES Left 04/17/2017    06/10/2016   • TONSILLECTOMY     • TOTAL HIP ARTHROPLASTY         Therapy Treatment    Rehabilitation Treatment Summary     Row Name 07/15/20 0932             Treatment Time/Intention    Discipline  occupational therapy assistant  -CJ      Document Type  therapy note (daily note)  -CJ      Subjective Information  complains of;weakness;fatigue  -CJ      Mode of Treatment  occupational therapy  -CJ      Patient Effort  good  -CJ      Existing Precautions/Restrictions  fall  -CJ      Recorded by [CJ] Francisco Castro COTA/L 07/15/20 1138      Row Name 07/15/20 0932             Bed Mobility Assessment/Treatment    Bed Mobility Assessment/Treatment  bed mobility (all) activities  -CJ      [place] Level (Bed Mobility)  independent  -CJ      Recorded by [CJ] Francisco Castro COTA/L 07/15/20 1138      Row Name 07/15/20 0932             Functional Mobility    Functional Mobility- Ind. Level  supervision required  -CJ      Functional Mobility-Distance (Feet)  25  -CJ      Recorded by [CJ] Francisco Castro COTA/L 07/15/20 1138      Row Name 07/15/20 0932             Sit-Stand Transfer    Sit-Stand [place] (Transfers)  independent;supervision  -CJ      Recorded by [CJ] Francisco Castro COTA/L 07/15/20 1138      Row Name 07/15/20 0932             Stand-Sit Transfer    Stand-Sit [place] (Transfers)  independent;supervision  -CJ      Recorded by [CJ] Francisco Castro COTA/L 07/15/20 1138      Row Name 07/15/20 0932             ADL Assessment/Intervention    35092 - OT Self Care/Mgmt Minutes  40  -CJ      BADL Assessment/Intervention  bathing;upper body dressing;lower body dressing   -CJ      Recorded by [CJ] Francisco Castro COTA/L 07/15/20 1138      Row Name 07/15/20 0932             Bathing Assessment/Intervention    Bathing [place] Level  bathing skills;conditional independence;supervision  -CJ      Assistive Devices (Bathing)  bath mitt;grab bars/tub rail;hand-held shower spray hose;shower chair  -CJ      Bathing Position  supported sitting;supported standing  -CJ      Recorded by [CJ] Francisco Castro COTA/L 07/15/20 1138      Row Name 07/15/20 0932             Upper Body Dressing Assessment/Training    Upper Body Dressing [place] Level  doff;don;front opening garment;set up;contact guard assist  -CJ      Upper Body Dressing Position  unsupported sitting;edge of bed sitting  -CJ      Recorded by [CJ] Francisco Castro COTA/L 07/15/20 1138      Row Name 07/15/20 0932             Lower Body Dressing Assessment/Training    Lower Body Dressing [place] Level  doff;don;socks;set up;contact guard assist  -CJ      Lower Body Dressing Position  edge of bed sitting;unsupported sitting  -CJ      Recorded by [CJ] Francisco Castro COTA/L 07/15/20 1138      Row Name 07/15/20 0932             Positioning and Restraints    Pre-Treatment Position  in bed  -CJ      Post Treatment Position  bed  -CJ      In Bed  sitting EOB;call light within reach;encouraged to call for assist;side rails up x1  -CJ      Recorded by [CJ] Francisco Castro COTA/L 07/15/20 1138      Row Name 07/15/20 0932             Pain Assessment    Additional Documentation  Pain Scale 2: Word Pre/Post-Treatment (Group)  -CJ      Recorded by [CJ] Francisco Castro COTA/L 07/15/20 1138      Row Name 07/15/20 0932             Pain Scale: Numbers Pre/Post-Treatment    Pain Scale: Numbers, Pretreatment  0/10 - no pain  -CJ      Pain Scale: Numbers, Post-Treatment  0/10 - no pain  -CJ      Pain Intervention(s)  Repositioned;Rest;Shower  -CJ      Recorded by [CJ] Francisco Castro COTA/L 07/15/20 1138      Row Name  07/15/20 0932             Outcome Summary/Treatment Plan (OT)    Daily Summary of Progress (OT)  progress toward functional goals is good  -      Barriers to Overall Progress (OT)  Fall!  -      Plan for Continued Treatment (OT)  continue with ot poc!  -      Recorded by [CJ] Francisco CastroMAYLIN/NIK 07/15/20 1138        User Key  (r) = Recorded By, (t) = Taken By, (c) = Cosigned By    Initials Name Effective Dates Discipline     Francisco CastroMAYLIN/L 08/02/16 -  OT             Occupational Therapy Education                 Title: PT OT SLP Therapies (In Progress)     Topic: Occupational Therapy (In Progress)     Point: ADL training (Done)     Description:   Instruct learner(s) on proper safety adaptation and remediation techniques during self care or transfers.   Instruct in proper use of assistive devices.              Learning Progress Summary           Patient Acceptance, E,TB, DU,NR,VU by  at 7/15/2020 1139    Comment:  Pt. required set-up/supervision/cga, with adl bathing/dressing!    Acceptance, E,TB, VU,DU,NR by  at 7/14/2020 1117    Comment:  Pt. performed adl bathing/dressing!    Acceptance, E,TB, VU,DU,NR by  at 7/13/2020 1127    Comment:  Pt. performed adl bathing/dressing adls'!    Acceptance, E, VU by  at 7/12/2020 1459    Comment:  OT role, benefits, POC, d/c planning, home safety, endurance techniques                   Point: Home exercise program (Not Started)     Description:   Instruct learner(s) on appropriate technique for monitoring, assisting and/or progressing therapeutic exercises/activities.              Learner Progress:   Not documented in this visit.          Point: Precautions (Done)     Description:   Instruct learner(s) on prescribed precautions during self-care and functional transfers.              Learning Progress Summary           Patient Acceptance, E,TB, DU,NR,VU by  at 7/15/2020 1139    Comment:  Pt. required set-up/supervision/cga, with adl  bathing/dressing!    Acceptance, E,TB, VU,DU,NR by  at 7/14/2020 1117    Comment:  Pt. performed adl bathing/dressing!    Acceptance, E,TB, VU,DU,NR by  at 7/13/2020 1127    Comment:  Pt. performed adl bathing/dressing adls'!    Acceptance, E, VU by MM at 7/12/2020 1459    Comment:  OT role, benefits, POC, d/c planning, home safety, endurance techniques                   Point: Body mechanics (Done)     Description:   Instruct learner(s) on proper positioning and spine alignment during self-care, functional mobility activities and/or exercises.              Learning Progress Summary           Patient Acceptance, E,TB, DU,NR,VU by  at 7/15/2020 1139    Comment:  Pt. required set-up/supervision/cga, with adl bathing/dressing!    Acceptance, E,TB, VU,DU,NR by  at 7/14/2020 1117    Comment:  Pt. performed adl bathing/dressing!    Acceptance, E,TB, VU,DU,NR by  at 7/13/2020 1127    Comment:  Pt. performed adl bathing/dressing adls'!    Acceptance, E, VU by MM at 7/12/2020 1459    Comment:  OT role, benefits, POC, d/c planning, home safety, endurance techniques                               User Key     Initials Effective Dates Name Provider Type Discipline     08/02/16 -  Francisco Castro COTA/L Occupational Therapy Assistant OT     07/05/20 -  Gaurang Emmanuel, MAURIZIOR/L Occupational Therapist OT                OT Recommendation and Plan  Outcome Summary/Treatment Plan (OT)  Daily Summary of Progress (OT): progress toward functional goals is good  Barriers to Overall Progress (OT): Fall!  Plan for Continued Treatment (OT): continue with ot poc!  Daily Summary of Progress (OT): progress toward functional goals is good  Plan of Care Review  Plan of Care Reviewed With: patient  Plan of Care Reviewed With: patient  Outcome Measures     Row Name 07/14/20 0830 07/13/20 0855 07/12/20 1400       How much help from another is currently needed...    Putting on and taking off regular lower body clothing?  3  -  3   -  2  -MM    Bathing (including washing, rinsing, and drying)  4  -  4  -  2  -MM    Toileting (which includes using toilet bed pan or urinal)  3  -  3  -  3  -MM    Putting on and taking off regular upper body clothing  4  -  4  -CJ  4  -MM    Taking care of personal grooming (such as brushing teeth)  4  -  4  -CJ  4  -MM    Eating meals  4  -  4  -  4  -MM    AM-PAC 6 Clicks Score (OT)  22  -  22  -  19  -MM       Functional Assessment    Outcome Measure Options  --  AM-PAC 6 Clicks Daily Activity (OT)  -  AM-PAC 6 Clicks Daily Activity (OT)  -MM      User Key  (r) = Recorded By, (t) = Taken By, (c) = Cosigned By    Initials Name Provider Type    Francisco Mcgowan, CARLISLE/L Occupational Therapy Assistant    MM Gaurang Emmanuel, OTR/L Occupational Therapist           Time Calculation:   Time Calculation- OT     Row Name 07/15/20 0932             Timed Charges    69460 - OT Self Care/Mgmt Minutes  40  -        User Key  (r) = Recorded By, (t) = Taken By, (c) = Cosigned By    Initials Name Provider Type    Francisco Mcgowan, CARLISLE/L Occupational Therapy Assistant        Therapy Charges for Today     Code Description Service Date Service Provider Modifiers Qty    [id number] HC OT SELF CARE/MGMT/TRAIN EA 15 MIN 7/14/2020 Francisco Castro COTA/L GO 3               MATEO Larios  7/15/2020

## 2020-07-15 NOTE — PROGRESS NOTES
Nephrology ([facility]) Progress Note      Patient:  Ricardo Hugo  YOB: 1950  Date of Service: 7/15/2020  MRN: [id number]   Acct: [id number]   Primary Care Physician: Joe Velasco MD  Advance Directive:   Code Status and Medical Interventions:   Ordered at: 07/11/20 1455     Level Of Support Discussed With:    Patient    Health Care Surrogate     Code Status:    CPR     Medical Interventions (Level of Support Prior to Arrest):    Full     Admit Date: 7/11/2020       Hospital Day: 4  Referring Provider: Jamie Pineda*      Patient personally seen and examined.  Complete chart including Consults, Notes, Operative Reports, Labs, Cardiology, and Radiology studies reviewed as able.        Subjective:  Ricardo Hugo is a 70 y.o. male  whom we were consulted for acute kidney injury.  No prior known renal issues.  History of hypertension, CAD with prior CABG.  Presented with weakness, fever, diarrhea, recent weight loss. Recently has had colonoscopy which showed colitis; patient was placed on steroid.  Initial creatinine 4.3.  Hospital course remarkable for slow improvement of renal function with IV fluids    Today patient continues to feel better. No new overnight issues. Urine output nonoliguric    Allergies:  Lortab [hydrocodone-acetaminophen]    Home Meds:  Facility-Administered Medications Prior to Admission   Medication Dose Route Frequency Provider Last Rate Last Dose   • cyanocobalamin injection 1,000 mcg  1,000 mcg Intramuscular Q28 Days Joe Velasco MD   1,000 mcg at 07/06/20 1552     Medications Prior to Admission   Medication Sig Dispense Refill Last Dose   • albuterol (PROVENTIL HFA;VENTOLIN HFA) 108 (90 BASE) MCG/ACT inhaler Inhale 2 puffs 4 (Four) Times a Day As Needed for Wheezing or Shortness of Air.   Past Week at Unknown time   • amLODIPine (NORVASC) 10 MG tablet Take 10 mg by mouth Daily.   7/11/2020 at Unknown time   • aspirin (ASPIR)  81 MG EC tablet Take 81 mg by mouth Daily.   7/11/2020 at Unknown time   • azelastine (ASTELIN) 0.1 % nasal spray 1 spray into the nostril(s) as directed by provider 2 (Two) Times a Day As Needed for Rhinitis.   Past Week at Unknown time   • CloNIDine (CATAPRES) 0.2 MG tablet Take 0.2 mg by mouth 3 (Three) Times a Day.   7/11/2020 at Unknown time   • desoximetasone (TOPICORT) 0.25 % cream Apply 1 application topically to the appropriate area as directed 2 (Two) Times a Day.   7/11/2020 at Unknown time   • diphenoxylate-atropine (LOMOTIL) 2.5-0.025 MG per tablet Take 1 tablet by mouth 4 (Four) Times a Day As Needed for Diarrhea.   Past Week at Unknown time   • fexofenadine (ALLEGRA) 180 MG tablet Take 180 mg by mouth Daily.   7/11/2020 at Unknown time   • magnesium chloride ER (MAG-DELAY) 535 (64 MG) MG CR tablet Take 64 mg by mouth Daily.   7/11/2020 at Unknown time   • metoprolol succinate XL (TOPROL-XL) 25 MG 24 hr tablet Take 1 tablet by mouth Daily. 30 tablet 5 7/11/2020 at Unknown time   • Multiple Vitamins-Minerals (PRESERVISION/LUTEIN) capsule Take 1 capsule by mouth 2 (two) times a day.   7/11/2020 at Unknown time   • Omeprazole Magnesium 20.6 (20 BASE) MG capsule delayed-release Take 1 capsule by mouth Daily.   7/11/2020 at Unknown time   • valsartan (DIOVAN) 160 MG tablet Take 160 mg by mouth Daily.   7/11/2020 at Unknown time       Medicines:  Current Facility-Administered Medications   Medication Dose Route Frequency Provider Last Rate Last Dose   • acetaminophen (TYLENOL) tablet 650 mg  650 mg Oral Q4H PRN Maria L Zambrano APRN       • allopurinol (ZYLOPRIM) tablet 100 mg  100 mg Oral Daily Bolivar Rueda MD   100 mg at 07/15/20 0808   • amLODIPine (NORVASC) tablet 10 mg  10 mg Oral Daily Maria L Zambrano APRN   10 mg at 07/15/20 0808   • aspirin EC tablet 81 mg  81 mg Oral Daily Maria L Zambrano APRN   81 mg at 07/15/20 0808   • cefTRIAXone (ROCEPHIN) 2 g/100 mL 0.9% NS VTB (TOSIN)  2  g Intravenous Q24H Woeltosiel, Maria L K, APRN   2 g at 07/14/20 1711   • cholecalciferol (VITAMIN D3) tablet 2,000 Units  2,000 Units Oral Daily Woeltz, Maria L K, APRN   2,000 Units at 07/15/20 0808   • cholestyramine (QUESTRAN) packet 1 packet  1 packet Oral Daily WoeltDhaval cartagenaia K, APRN   1 packet at 07/15/20 0808   • cloNIDine (CATAPRES) tablet 0.2 mg  0.2 mg Oral Q8H Woeltz, Maria L K, APRN   0.2 mg at 07/15/20 0620   • enoxaparin (LOVENOX) syringe 30 mg  30 mg Subcutaneous Q24H Woeltosiel, Maria L K, APRN   30 mg at 07/14/20 1711   • famotidine (PEPCID) tablet 20 mg  20 mg Oral Daily Jamie Pineda MD   20 mg at 07/15/20 0808   • iron sucrose (VENOFER) 200 mg in sodium chloride 0.9 % 100 mL IVPB  200 mg Intravenous Q24H WoeltVanessa cartagenaMaria L K, APRN   200 mg at 07/15/20 0808   • lactated ringers infusion  75 mL/hr Intravenous Continuous Slava Tate APRN 75 mL/hr at 07/14/20 1711 75 mL/hr at 07/14/20 1711   • magnesium oxide (MAGOX) tablet 400 mg  400 mg Oral BID WoeltDhaval cartagenaia K, APRN   400 mg at 07/15/20 0808   • magnesium sulfate 4 gram infusion - Mg less than or equal to 1mg/dL  4 g Intravenous PRN Hamida Rosenbaum, APRSHERITA        Or   • magnesium sulfate 3 gram infusion (1gm x 3) - Mg 1.1 - 1.5 mg/dL  1 g Intravenous PRN Hamida Rosenbaum APRN 100 mL/hr at 07/14/20 1404 1 g at 07/14/20 1404    Or   • Magnesium Sulfate 2 gram infusion- Mg 1.6 - 1.9 mg/dL  2 g Intravenous PRN Hamida Rosenbaum, APRN       • melatonin tablet 3 mg  3 mg Oral Nightly PRN WoDhaval finkia K, APRN   3 mg at 07/14/20 2141   • metoprolol succinate XL (TOPROL-XL) 24 hr tablet 25 mg  25 mg Oral Daily Maria L Zambrano APRN   25 mg at 07/15/20 0808   • ondansetron (ZOFRAN) injection 4 mg  4 mg Intravenous Q6H PRN Maria L Zambrano APRN       • saccharomyces boulardii (FLORASTOR) capsule 250 mg  250 mg Oral BID Maria L Zambrano APRN   250 mg at 07/15/20 0808   • sodium chloride 0.9 % flush 10 mL  10 mL Intravenous Q12H  Maria L Zambrano APRN   10 mL at 07/12/20 2016   • sodium chloride 0.9 % flush 10 mL  10 mL Intravenous PRN Maria L Zambrano APRN           Past Medical History:  Past Medical History:   Diagnosis Date   • Acid reflux    • Allergic rhinitis    • Chronic mucoid otitis media    • Chronic rhinitis    • Chronic sinusitis    • Coronary artery disease     HEART BYPASS 2004   • Eustachian tube dysfunction    • Heart disease    • Hypertension    • Mixed hearing loss of left ear    • Perianal abscess    • Sensorineural hearing loss    • Tinnitus        Past Surgical History:  Past Surgical History:   Procedure Laterality Date   • COLONOSCOPY N/A 7/2/2020    Procedure: COLONOSCOPY WITH ANESTHESIA;  Surgeon: Adrien Brewster MD;  Location: [facility] ENDOSCOPY;  Service: Gastroenterology;  Laterality: N/A;  pre op: diarrhea  post op: polyps  PCP: Joe Velasco MD   • CORONARY ARTERY BYPASS GRAFT     • INCISION AND DRAINAGE PERIRECTAL ABSCESS N/A 3/3/2017    Procedure: INCISION AND DRAINAGE OF JEET ANAL ABSCESS;  Surgeon: Lynette Smith MD;  Location: [facility] OR;  Service:    • MYRINGOTOMY W/ TUBES Left 04/17/2017    06/10/2016   • TONSILLECTOMY     • TOTAL HIP ARTHROPLASTY         Family History  Family History   Problem Relation Age of Onset   • No Known Problems Mother    • No Known Problems Father    • Colon cancer Neg Hx    • Colon polyps Neg Hx        Social History  Social History     Socioeconomic History   • Marital status:      Spouse name: Not on file   • Number of children: Not on file   • Years of education: Not on file   • Highest education level: Not on file   Tobacco Use   • Smoking status: Former Smoker   • Smokeless tobacco: Never Used   • Tobacco comment: quit 2010   Substance and Sexual Activity   • Alcohol use: Yes     Alcohol/week: 14.0 standard drinks     Types: 14 Cans of beer per week     Comment: 2 beers daily   • Drug use: No   • Sexual activity: Defer         Review of  "Systems:  History obtained from chart review and the patient  General ROS: No fever or chills  Respiratory ROS: No cough, shortness of breath, wheezing  Cardiovascular ROS: No chest pain or palpitations  Gastrointestinal ROS: No abdominal pain or melena  Genito-Urinary ROS: No dysuria or hematuria  Neurological ROS: no headache or dizziness    Objective:  Patient Vitals for the past 24 hrs:   BP Temp Temp src Pulse Resp SpO2 Height Weight   07/15/20 0806 140/50 97.6 °F (36.4 °C) Temporal 62 16 92 % -- --   07/15/20 0620 151/55 -- -- 63 -- -- -- --   07/15/20 0428 138/59 98.4 °F (36.9 °C) Oral 64 18 95 % -- --   07/15/20 0034 147/57 98.3 °F (36.8 °C) Oral 60 18 94 % -- --   07/14/20 1945 147/54 98.6 °F (37 °C) Oral 76 18 97 % -- --   07/14/20 1621 -- -- -- -- -- -- 182.9 cm (72.01\") 76.1 kg (167 lb 12.3 oz)   07/14/20 1540 123/51 98.9 °F (37.2 °C) Oral 59 16 94 % -- --   07/14/20 1227 137/60 97.6 °F (36.4 °C) Oral 60 18 97 % -- --       Intake/Output Summary (Last 24 hours) at 7/15/2020 1043  Last data filed at 7/15/2020 1013  Gross per 24 hour   Intake 2894 ml   Output 2675 ml   Net 219 ml     General: awake/alert    Neck: supple, no JVD  Chest:  clear to auscultation bilaterally without respiratory distress  CVS: regular rate and rhythm  Abdominal: soft, nontender, positive bowel sounds  Extremities: no cyanosis or edema  Skin: warm and dry without rash  Neuro: no focal motor deficits    Labs:  Results from last 7 days   Lab Units 07/15/20  0355 07/14/20  0231 07/13/20  0434   WBC 10*3/mm3 8.79 12.07* 13.45*   HEMOGLOBIN g/dL 9.5* 8.9* 9.6*   HEMATOCRIT % 29.2* 26.3* 28.3*   PLATELETS 10*3/mm3 219 219 234         Results from last 7 days   Lab Units 07/15/20  0355 07/14/20  0231 07/13/20  0538   SODIUM mmol/L 135* 138 140   POTASSIUM mmol/L 4.0 4.1 3.9   CHLORIDE mmol/L 100 102 104   CO2 mmol/L 24.0 24.0 23.0   BUN mg/dL 43* 51* 64*   CREATININE mg/dL 3.14* 3.26* 3.50*   CALCIUM mg/dL 8.5* 8.1* 8.0*   BILIRUBIN " mg/dL 0.3 0.3 0.3   ALK PHOS U/L 178* 163* 173*   ALT (SGPT) U/L 35 31 31   AST (SGOT) U/L 31 25 22   GLUCOSE mg/dL 116* 106* 100*       Radiology:   Imaging Results (Last 72 Hours)     ** No results found for the last 72 hours. **          Culture:  Blood Culture   Date Value Ref Range Status   07/11/2020 Escherichia coli (C)  Preliminary   07/11/2020 Escherichia coli (C)  Preliminary     Urine Culture   Date Value Ref Range Status   07/11/2020 >100,000 CFU/mL Escherichia coli (A)  Final         Assessment   1.  Acute kidney injury; ATN--improving  2.  Urinary tract infection  3.  Metabolic acidosis--improved  4.  Hypokalemia--improved  5.  Chronic diarrhea  6.  Anemia; iron deficiency    Plan:  1.  Continue IV fluids  2.  Monitor labs      Slava Tate, APRN  7/15/2020  10:43

## 2020-07-15 NOTE — THERAPY DISCHARGE NOTE
Acute Care - Occupational Therapy Discharge Summary  [facility]     Patient Name: Ricardo Hugo  : 1950  MRN: [id number]    Today's Date: 7/15/2020  Onset of Illness/Injury or Date of Surgery: 20    Date of Referral to OT: 20  Referring Physician: Dr. Pineda      Admit Date: 2020        OT Recommendation and Plan    Visit Dx:    ICD-10-CM ICD-9-CM   1. AMAYA (acute kidney injury) (CMS/[facility]) N17.9 584.9   2. Hyponatremia E87.1 276.1   3. Diarrhea, unspecified type R19.7 787.91   4. Acute UTI (urinary tract infection) N39.0 599.0   5. Impaired mobility and ADLs Z74.09 V49.89    Z78.9    6. Impaired mobility Z74.09 799.89         Time Calculation- OT     Row Name 07/15/20 0932             Timed Charges    13733 - OT Self Care/Mgmt Minutes  40  -CJ        User Key  (r) = Recorded By, (t) = Taken By, (c) = Cosigned By    Initials Name Provider Type    Francisco Mcgowan, MAYLIN/L Occupational Therapy Assistant            Rehab Goal Summary     Row Name 07/15/20 1500             Bathing Goal 1 (OT)    Activity/Assistive Device (Bathing Goal 1, OT)  bathing skills, all  -CS      [place] Level/Cues Needed (Bathing Goal 1, OT)  independent  -CS      Time Frame (Bathing Goal 1, OT)  10 days  -CS      Progress/Outcomes (Bathing Goal 1, OT)  goal not met  -CS         Dressing Goal 1 (OT)    Activity/Assistive Device (Dressing Goal 1, OT)  dressing skills, all  -CS      [place]/Cues Needed (Dressing Goal 1, OT)  independent  -CS      Time Frame (Dressing Goal 1, OT)  10 days  -CS      Progress/Outcome (Dressing Goal 1, OT)  goal not met  -CS         Toileting Goal 1 (OT)    Activity/Device (Toileting Goal 1, OT)  toileting skills, all  -CS      [place] Level/Cues Needed (Toileting Goal 1, OT)  independent  -CS      Time Frame (Toileting Goal 1, OT)  10 days  -CS      Progress/Outcome (Toileting Goal 1, OT)  goal not met  -CS        User Key  (r) = Recorded By, (t) = Taken By, (c) =  Cosigned By    Initials Name Provider Type Discipline    CS Priscila Hall OTR/L, GAURAV Occupational Therapist OT          Outcome Measures     Row Name 07/14/20 0830 07/13/20 0855          How much help from another is currently needed...    Putting on and taking off regular lower body clothing?  3  -CJ  3  -CJ     Bathing (including washing, rinsing, and drying)  4  -CJ  4  -CJ     Toileting (which includes using toilet bed pan or urinal)  3  -CJ  3  -CJ     Putting on and taking off regular upper body clothing  4  -CJ  4  -CJ     Taking care of personal grooming (such as brushing teeth)  4  -CJ  4  -CJ     Eating meals  4  -CJ  4  -CJ     AM-PAC 6 Clicks Score (OT)  22  -  22  -        Functional Assessment    Outcome Measure Options  --  AM-PAC 6 Clicks Daily Activity (OT)  -       User Key  (r) = Recorded By, (t) = Taken By, (c) = Cosigned By    Initials Name Provider Type    CJ Francisco Castro COTA/L Occupational Therapy Assistant          Therapy Suggested Charges     Code   Minutes Charges    38808 (CPT®) Hc Ot Neuromusc Re Education Ea 15 Min      83885 (CPT®) Hc Ot Ther Proc Ea 15 Min      15902 (CPT®) Hc Ot Therapeutic Act Ea 15 Min      42600 (CPT®) Hc Ot Manual Therapy Ea 15 Min      15447 (CPT®) Hc Ot Iontophoresis Ea 15 Min      20717 (CPT®) Hc Ot Elec Stim Ea-Per 15 Min      41065 (CPT®) Hc Ot Ultrasound Ea 15 Min      49612 (CPT®) Hc Ot Self Care/Mgmt/Train Ea 15 Min 40 3    Total  40 3              OT Discharge Summary  Anticipated Discharge Disposition (OT): home with assist  Reason for Discharge: Discharge from facility  Outcomes Achieved: Refer to plan of care for updates on goals achieved  Discharge Destination: Home with assist      BEATRIS Melton/L, GAURAV  7/15/2020

## 2020-07-15 NOTE — PROGRESS NOTES
[facility] -  Progress Note     LOS: 4 days   Patient Care Team:  Joe Velasco MD as PCP - General (Internal Medicine)    Chief Complaint:VT     Subjective     Interval History:   2D echo notes normal LV function with moderate PHTN. No further VT noted on telemetry. Mag was replaced yesterday and repeat this am was normal. He states he feels great today. He denies chest pain, shortness of breath and palpitations.         Review of Systems:   Review of Systems   Constitutional: Negative for chills, diaphoresis, fatigue and unexpected weight change.   HENT: Negative for nosebleeds.    Respiratory: Negative for cough, chest tightness, shortness of breath and wheezing.    Cardiovascular: Negative for chest pain, palpitations and leg swelling.   Gastrointestinal: Negative for anal bleeding, blood in stool, diarrhea and nausea.   Neurological: Negative for dizziness, syncope and light-headedness.   All other systems reviewed and are negative.      Objective     Vital Sign Min/Max for last 24 hours  Temp  Min: 97.6 °F (36.4 °C)  Max: 98.9 °F (37.2 °C)   BP  Min: 123/51  Max: 151/55   Pulse  Min: 59  Max: 76   Resp  Min: 16  Max: 18   SpO2  Min: 92 %  Max: 97 %   No data recorded   Weight  Min: 76.1 kg (167 lb 12.3 oz)  Max: 76.1 kg (167 lb 12.3 oz)   Tele: NSR 50-70 with PVCs and PACs       07/14/20  1621   Weight: 76.1 kg (167 lb 12.3 oz)         Intake/Output Summary (Last 24 hours) at 7/15/2020 0900  Last data filed at 7/15/2020 0813  Gross per 24 hour   Intake 2894 ml   Output 2425 ml   Net 469 ml         Physical Exam:  Physical Exam   Constitutional: He is oriented to person, place, and time. He appears well-developed and well-nourished. No distress.   HENT:   Head: Normocephalic.   Mouth/Throat: No oropharyngeal exudate.   Eyes: Pupils are equal, round, and reactive to light. Conjunctivae and EOM are normal. No scleral icterus.   Neck: Normal range of motion. Neck supple.      Cardiovascular: Normal rate, regular rhythm, normal heart sounds and intact distal pulses.   No murmur heard.  Pulmonary/Chest: Effort normal and breath sounds normal. No respiratory distress. He has no wheezes. He has no rales. He exhibits no tenderness.   Abdominal: Soft. Bowel sounds are normal. He exhibits no distension. There is no tenderness.   Musculoskeletal: Normal range of motion. He exhibits no edema.   Neurological: He is alert and oriented to person, place, and time. He has normal reflexes.   Skin: Skin is warm and dry. No rash noted. He is not diaphoretic. No erythema. No pallor.   Psychiatric: He has a normal mood and affect. His behavior is normal.   Vitals reviewed.       Results Review:   Lab Results (last 72 hours)     Procedure Component Value Units Date/Time    Blood Culture With RODRICK - Blood, Arm, Left [[id number]] Collected:  07/13/20 0430    Specimen:  Blood from Arm, Left Updated:  07/15/20 0500     Blood Culture No growth at 2 days    Magnesium [[id number]]  (Normal) Collected:  07/15/20 0355    Specimen:  Blood Updated:  07/15/20 0452     Magnesium 2.0 mg/dL     Comprehensive Metabolic Panel [[id number]]  (Abnormal) Collected:  07/15/20 0355    Specimen:  Blood Updated:  07/15/20 0451     Glucose 116 mg/dL      BUN 43 mg/dL      Creatinine 3.14 mg/dL      Sodium 135 mmol/L      Potassium 4.0 mmol/L      Chloride 100 mmol/L      CO2 24.0 mmol/L      Calcium 8.5 mg/dL      Total Protein 5.9 g/dL      Albumin 2.40 g/dL      ALT (SGPT) 35 U/L      AST (SGOT) 31 U/L      Alkaline Phosphatase 178 U/L      Total Bilirubin 0.3 mg/dL      eGFR Non African Amer 20 mL/min/1.73      Globulin 3.5 gm/dL      A/G Ratio 0.7 g/dL      BUN/Creatinine Ratio 13.7     Anion Gap 11.0 mmol/L     Narrative:       GFR Normal >60  Chronic Kidney Disease <60  Kidney Failure <15      CBC & Differential [[id number]] Collected:  07/15/20 0355    Specimen:  Blood Updated:  07/15/20 0410    Narrative:       The following  orders were created for panel order CBC & Differential.  Procedure                               Abnormality         Status                     ---------                               -----------         ------                     CBC Auto Differential[[id number]]        Abnormal            Final result                 Please view results for these tests on the individual orders.    CBC Auto Differential [[id number]]  (Abnormal) Collected:  07/15/20 0355    Specimen:  Blood Updated:  07/15/20 0410     WBC 8.79 10*3/mm3      RBC 2.96 10*6/mm3      Hemoglobin 9.5 g/dL      Hematocrit 29.2 %      MCV 98.6 fL      MCH 32.1 pg      MCHC 32.5 g/dL      RDW 14.1 %      RDW-SD 51.3 fl      MPV 10.5 fL      Platelets 219 10*3/mm3      Neutrophil % 76.8 %      Lymphocyte % 14.6 %      Monocyte % 6.3 %      Eosinophil % 0.9 %      Basophil % 0.7 %      Immature Grans % 0.7 %      Neutrophils, Absolute 6.76 10*3/mm3      Lymphocytes, Absolute 1.28 10*3/mm3      Monocytes, Absolute 0.55 10*3/mm3      Eosinophils, Absolute 0.08 10*3/mm3      Basophils, Absolute 0.06 10*3/mm3      Immature Grans, Absolute 0.06 10*3/mm3      nRBC 0.0 /100 WBC     Blood Culture - Blood, Arm, Right [[id number]]  (Abnormal) Collected:  07/11/20 1211    Specimen:  Blood from Arm, Right Updated:  07/14/20 0521     Blood Culture Escherichia coli     Comment: Refer to previous blood culture collected on 7/11/2020 1254 for MICs.        Isolated from Anaerobic Bottle     Gram Stain Anaerobic Bottle Gram negative bacilli    Blood Culture - Blood, Hand, Right [[id number]]  (Abnormal)  (Susceptibility) Collected:  07/11/20 1154    Specimen:  Blood from Hand, Right Updated:  07/14/20 0521     Blood Culture Escherichia coli     Isolated from Anaerobic Bottle     Gram Stain Anaerobic Bottle Gram negative bacilli    Susceptibility      Escherichia coli     ASPEN     Ampicillin Resistant     Ampicillin + Sulbactam Resistant     Cefepime Susceptible     Ceftazidime  Susceptible     Ceftriaxone Susceptible     Gentamicin Susceptible     Levofloxacin Susceptible     Piperacillin + Tazobactam Susceptible     Trimethoprim + Sulfamethoxazole Susceptible                Susceptibility Comments     Escherichia coli    Cefazolin sensitivity will not be reported for Enterobacteriaceae in non-urine isolates. If cefazolin is preferred, please call the microbiology lab to request an E-test.  With the exception of urinary-sourced infections, aminoglycosides should not be used as monotherapy.             Comprehensive Metabolic Panel [[id number]]  (Abnormal) Collected:  07/14/20 0231    Specimen:  Blood Updated:  07/14/20 0339     Glucose 106 mg/dL      BUN 51 mg/dL      Creatinine 3.26 mg/dL      Sodium 138 mmol/L      Potassium 4.1 mmol/L      Chloride 102 mmol/L      CO2 24.0 mmol/L      Calcium 8.1 mg/dL      Total Protein 5.7 g/dL      Albumin 2.40 g/dL      ALT (SGPT) 31 U/L      AST (SGOT) 25 U/L      Alkaline Phosphatase 163 U/L      Total Bilirubin 0.3 mg/dL      eGFR Non African Amer 19 mL/min/1.73      Globulin 3.3 gm/dL      A/G Ratio 0.7 g/dL      BUN/Creatinine Ratio 15.6     Anion Gap 12.0 mmol/L     Narrative:       GFR Normal >60  Chronic Kidney Disease <60  Kidney Failure <15      Magnesium [[id number]]  (Abnormal) Collected:  07/14/20 0231    Specimen:  Blood Updated:  07/14/20 0339     Magnesium 1.3 mg/dL     CBC & Differential [[id number]] Collected:  07/14/20 0231    Specimen:  Blood Updated:  07/14/20 0316    Narrative:       The following orders were created for panel order CBC & Differential.  Procedure                               Abnormality         Status                     ---------                               -----------         ------                     CBC Auto Differential[[id number]]        Abnormal            Final result                 Please view results for these tests on the individual orders.    CBC Auto Differential [[id number]]  (Abnormal) Collected:   07/14/20 0231    Specimen:  Blood Updated:  07/14/20 0316     WBC 12.07 10*3/mm3      RBC 2.72 10*6/mm3      Hemoglobin 8.9 g/dL      Hematocrit 26.3 %      MCV 96.7 fL      MCH 32.7 pg      MCHC 33.8 g/dL      RDW 14.2 %      RDW-SD 50.3 fl      MPV 10.7 fL      Platelets 219 10*3/mm3      Neutrophil % 83.3 %      Lymphocyte % 8.8 %      Monocyte % 6.0 %      Eosinophil % 0.6 %      Basophil % 0.4 %      Immature Grans % 0.9 %      Neutrophils, Absolute 10.05 10*3/mm3      Lymphocytes, Absolute 1.06 10*3/mm3      Monocytes, Absolute 0.73 10*3/mm3      Eosinophils, Absolute 0.07 10*3/mm3      Basophils, Absolute 0.05 10*3/mm3      Immature Grans, Absolute 0.11 10*3/mm3      nRBC 0.0 /100 WBC     Iron Profile [[id number]]  (Abnormal) Collected:  07/13/20 0538    Specimen:  Blood Updated:  07/13/20 1708     Iron 32 mcg/dL      Iron Saturation 19 %      Transferrin 116 mg/dL      TIBC 173 mcg/dL     Comprehensive Metabolic Panel [[id number]]  (Abnormal) Collected:  07/13/20 0538    Specimen:  Blood Updated:  07/13/20 0644     Glucose 100 mg/dL      BUN 64 mg/dL      Creatinine 3.50 mg/dL      Sodium 140 mmol/L      Potassium 3.9 mmol/L      Chloride 104 mmol/L      CO2 23.0 mmol/L      Calcium 8.0 mg/dL      Total Protein 5.7 g/dL      Albumin 2.40 g/dL      ALT (SGPT) 31 U/L      AST (SGOT) 22 U/L      Alkaline Phosphatase 173 U/L      Total Bilirubin 0.3 mg/dL      eGFR Non African Amer 17 mL/min/1.73      Globulin 3.3 gm/dL      A/G Ratio 0.7 g/dL      BUN/Creatinine Ratio 18.3     Anion Gap 13.0 mmol/L     Narrative:       GFR Normal >60  Chronic Kidney Disease <60  Kidney Failure <15      CBC & Differential [[id number]] Collected:  07/13/20 0434    Specimen:  Blood Updated:  07/13/20 0457    Narrative:       The following orders were created for panel order CBC & Differential.  Procedure                               Abnormality         Status                     ---------                               -----------          ------                     CBC Auto Differential[[id number]]        Abnormal            Final result                 Please view results for these tests on the individual orders.    CBC Auto Differential [[id number]]  (Abnormal) Collected:  07/13/20 0434    Specimen:  Blood Updated:  07/13/20 0457     WBC 13.45 10*3/mm3      RBC 2.97 10*6/mm3      Hemoglobin 9.6 g/dL      Hematocrit 28.3 %      MCV 95.3 fL      MCH 32.3 pg      MCHC 33.9 g/dL      RDW 14.3 %      RDW-SD 50.2 fl      MPV 10.4 fL      Platelets 234 10*3/mm3      Neutrophil % 82.2 %      Lymphocyte % 9.4 %      Monocyte % 6.2 %      Eosinophil % 0.4 %      Basophil % 0.5 %      Immature Grans % 1.3 %      Neutrophils, Absolute 11.06 10*3/mm3      Lymphocytes, Absolute 1.27 10*3/mm3      Monocytes, Absolute 0.83 10*3/mm3      Eosinophils, Absolute 0.05 10*3/mm3      Basophils, Absolute 0.07 10*3/mm3      Immature Grans, Absolute 0.17 10*3/mm3      nRBC 0.0 /100 WBC     Urine Culture - Urine, Urine, Clean Catch [[id number]]  (Abnormal)  (Susceptibility) Collected:  07/11/20 1213    Specimen:  Urine, Clean Catch Updated:  07/13/20 0301     Urine Culture >100,000 CFU/mL Escherichia coli    Susceptibility      Escherichia coli     ASPEN     Ampicillin Resistant     Ampicillin + Sulbactam Resistant     Cefazolin Susceptible     Cefepime Susceptible     Ceftazidime Susceptible     Ceftriaxone Susceptible     Gentamicin Susceptible     Levofloxacin Susceptible     Nitrofurantoin Susceptible     Piperacillin + Tazobactam Susceptible     Tetracycline Susceptible     Trimethoprim + Sulfamethoxazole Susceptible                    Vitamin D 25 Hydroxy [[id number]]  (Abnormal) Collected:  07/12/20 0705    Specimen:  Blood Updated:  07/12/20 2207     25 Hydroxy, Vitamin D 23.5 ng/ml     Narrative:       Reference Range for Total Vitamin D 25(OH)     Deficiency <20.0 ng/mL   Insufficiency 21-29 ng/mL   Sufficiency  ng/mL  Toxicity >100 ng/ml    Results may be  falsely increased if patient taking Biotin.      Eosinophil Smear - Urine, Urine, Clean Catch [[id number]]  (Abnormal) Collected:  07/11/20 1756    Specimen:  Urine, Clean Catch Updated:  07/12/20 1257     Eosinophil Smear 2 % EOS/100 Cells     Urea Nitrogen, Urine - Urine, Clean Catch [[id number]] Collected:  07/11/20 1756    Specimen:  Urine, Clean Catch Updated:  07/12/20 1212     Urea Nitrogen, Urine 221 mg/dL     Narrative:       Reference intervals for random urine have not been established.  Clinical usage is dependent upon physician's interpretation in combination with other laboratory tests.       Creatinine, Urine, Random - Urine, Clean Catch [[id number]] Collected:  07/11/20 1756    Specimen:  Urine, Clean Catch Updated:  07/12/20 1212     Creatinine, Urine 18.3 mg/dL     Narrative:       Reference intervals for random urine have not been established.  Clinical usage is dependent upon physician's interpretation in combination with other laboratory tests.       Gastrointestinal Panel, PCR - Stool, Per Rectum [[id number]]  (Normal) Collected:  07/12/20 0742    Specimen:  Stool from Per Rectum Updated:  07/12/20 0902     Campylobacter Not Detected     Plesiomonas shigelloides Not Detected     Salmonella Not Detected     Vibrio Not Detected     Vibrio cholerae Not Detected     Yersinia enterocolitica Not Detected     Enteroaggregative E. coli (EAEC) Not Detected     Enteropathogenic E. coli (EPEC) Not Detected     Enterotoxigenic E. coli (ETEC) lt/st Not Detected     Shiga-like toxin-producing E. coli (STEC) stx1/stx2 Not Detected     E. coli O157 Not Detected     Shigella/Enteroinvasive E. coli (EIEC) Not Detected     Cryptosporidium Not Detected     Cyclospora cayetanensis Not Detected     Entamoeba histolytica Not Detected     Giardia lamblia Not Detected     Adenovirus F40/41 Not Detected     Astrovirus Not Detected     Norovirus GI/GII Not Detected     Rotavirus A Not Detected     Sapovirus (I, II, IV or V)  Not Detected    Narrative:       If Aeromonas, Staphylococcus aureus or Bacillus cereus are suspected, please order [id number]: Stool Culture, Aeromonas, S aureus, B Cereus.              Echo EF Estimated  No results found for: ECHOEFEST      Cath Ejection Fraction Quantitative  No results found for: CATHEF        Medication Review: yes  Current Facility-Administered Medications   Medication Dose Route Frequency Provider Last Rate Last Dose   • acetaminophen (TYLENOL) tablet 650 mg  650 mg Oral Q4H PRN Woeltz, Maria L K, APRN       • allopurinol (ZYLOPRIM) tablet 100 mg  100 mg Oral Daily Bolivar Rueda MD   100 mg at 07/15/20 0808   • amLODIPine (NORVASC) tablet 10 mg  10 mg Oral Daily Woeltz, Maria L K, APRN   10 mg at 07/15/20 0808   • aspirin EC tablet 81 mg  81 mg Oral Daily Woeltz, Maria L K, APRN   81 mg at 07/15/20 0808   • cefTRIAXone (ROCEPHIN) 2 g/100 mL 0.9% NS VTB (TOSIN)  2 g Intravenous Q24H Woeltz, Maria L K, APRN   2 g at 07/14/20 1711   • cholecalciferol (VITAMIN D3) tablet 2,000 Units  2,000 Units Oral Daily Woeltz, Maria L K, APRN   2,000 Units at 07/15/20 0808   • cholestyramine (QUESTRAN) packet 1 packet  1 packet Oral Daily Woeltz, Maria L K, APRN   1 packet at 07/15/20 0808   • cloNIDine (CATAPRES) tablet 0.2 mg  0.2 mg Oral Q8H Woeltz, Maria L K, APRN   0.2 mg at 07/15/20 0620   • enoxaparin (LOVENOX) syringe 30 mg  30 mg Subcutaneous Q24H Woeltz, Maria L K, APRN   30 mg at 07/14/20 1711   • famotidine (PEPCID) tablet 20 mg  20 mg Oral Daily Jamie Pineda MD   20 mg at 07/15/20 0808   • iron sucrose (VENOFER) 200 mg in sodium chloride 0.9 % 100 mL IVPB  200 mg Intravenous Q24H Woeltz, Maria L K, APRN   200 mg at 07/15/20 0808   • lactated ringers infusion  75 mL/hr Intravenous Continuous Slava Tate APRN 75 mL/hr at 07/14/20 1711 75 mL/hr at 07/14/20 1711   • magnesium oxide (MAGOX) tablet 400 mg  400 mg Oral BID Maria L Zambrano APRN   400 mg at  07/15/20 0808   • magnesium sulfate 4 gram infusion - Mg less than or equal to 1mg/dL  4 g Intravenous PRN Hamida Rosenbaum APRN        Or   • magnesium sulfate 3 gram infusion (1gm x 3) - Mg 1.1 - 1.5 mg/dL  1 g Intravenous PRN Hamida Rosenbaum APRN 100 mL/hr at 07/14/20 1404 1 g at 07/14/20 1404    Or   • Magnesium Sulfate 2 gram infusion- Mg 1.6 - 1.9 mg/dL  2 g Intravenous PRN Hamida Rosenbaum APRN       • melatonin tablet 3 mg  3 mg Oral Nightly PRN WoDhaval finkia SHILA, APRN   3 mg at 07/14/20 2141   • metoprolol succinate XL (TOPROL-XL) 24 hr tablet 25 mg  25 mg Oral Daily WilfredeltDhaval cartagenaia K, APRN   25 mg at 07/15/20 0808   • ondansetron (ZOFRAN) injection 4 mg  4 mg Intravenous Q6H PRN Dhaval Zambranoia K, APRN       • saccharomyces boulardii (FLORASTOR) capsule 250 mg  250 mg Oral BID WoeltVanessa cartagenaMaria L K, APRN   250 mg at 07/15/20 0808   • sodium chloride 0.9 % flush 10 mL  10 mL Intravenous Q12H WoeltVanessa cartagenaMaria L K, APRN   10 mL at 07/12/20 2016   • sodium chloride 0.9 % flush 10 mL  10 mL Intravenous PRN Woeltz, Maria L K, APRN           Assessment/Plan       Diarrhea    AMAYA (acute kidney injury) (CMS/HCC)    Avascular necrosis of femoral head, left (CMS/HCC)    Hyponatremia    Acute cystitis    Metabolic acidosis    Weight loss    E coli bacteremia    Iron deficiency anemia    Hypomagnesemia    Ventricular tachycardia, nonsustained (CMS/HCC)    Plan:   -no further VT noted on tele  -normal LVEF on echo  -keep mag level at 2 or greater.   - no further cardiology workup neccessary at this time.  -follow up in our office with STERLING Finnegan in 3 months or sooner if symptoms worsen    Cardiology will signs off at this time. Please let us know if we can be of further assistance.     Further recommendations per Dr. Bee.    STERLING Brown  07/15/20  09:00

## 2020-07-15 NOTE — PLAN OF CARE
Problem: Patient Care Overview  Goal: Plan of Care Review  Outcome: Ongoing (interventions implemented as appropriate)  Flowsheets (Taken 7/15/2020 0098)  Outcome Summary: RD nutrition follow-up completed remotely.  Pt's po intake average is 56% of the last 4 meals.  For possible d/c today or tomorrow.

## 2020-07-15 NOTE — PLAN OF CARE
Problem: Patient Care Overview  Goal: Plan of Care Review  Outcome: Ongoing (interventions implemented as appropriate)  Flowsheets (Taken 7/15/2020 0246)  Progress: no change  Plan of Care Reviewed With: patient  Outcome Summary: Patient denies pain and nausea this shift. IVF per order. Voiding per urinal. Up ad. Mag replacement protocol followed. VSS. Safety maintained. Will continue to monitor.

## 2020-07-16 ENCOUNTER — CLINICAL SUPPORT (OUTPATIENT)
Dept: INTERNAL MEDICINE | Facility: CLINIC | Age: 70
End: 2020-07-16

## 2020-07-16 ENCOUNTER — TRANSITIONAL CARE MANAGEMENT TELEPHONE ENCOUNTER (OUTPATIENT)
Dept: CALL CENTER | Facility: HOSPITAL | Age: 70
End: 2020-07-16

## 2020-07-16 ENCOUNTER — TELEPHONE (OUTPATIENT)
Dept: INTERNAL MEDICINE | Facility: CLINIC | Age: 70
End: 2020-07-16

## 2020-07-16 DIAGNOSIS — D51.0 PERNICIOUS ANEMIA: ICD-10-CM

## 2020-07-16 DIAGNOSIS — D50.9 IRON DEFICIENCY ANEMIA, UNSPECIFIED IRON DEFICIENCY ANEMIA TYPE: ICD-10-CM

## 2020-07-16 LAB
BACTERIA SPEC AEROBE CULT: ABNORMAL
GRAM STN SPEC: ABNORMAL
GRAM STN SPEC: ABNORMAL
ISOLATED FROM: ABNORMAL

## 2020-07-16 PROCEDURE — 96372 THER/PROPH/DIAG INJ SC/IM: CPT | Performed by: INTERNAL MEDICINE

## 2020-07-16 RX ADMIN — CYANOCOBALAMIN 1000 MCG: 1000 INJECTION, SOLUTION INTRAMUSCULAR; SUBCUTANEOUS at 12:05

## 2020-07-16 NOTE — OUTREACH NOTE
Prep Survey      Responses   [facility] facility patient discharged from?  [place]   Is LACE score < 7 ?  No   Eligibility  [facility]   Date of Admission  07/11/20   Date of Discharge  07/15/20   Discharge Disposition  Home or Self Care   Discharge diagnosis  AMAYA, weakness, Vtach   COVID-19 Test Status  Negative   Does the patient have one of the following disease processes/diagnoses(primary or secondary)?  Other   Does the patient have Home health ordered?  No   Is there a DME ordered?  No   Prep survey completed?  Yes          Lula Lee, RN

## 2020-07-16 NOTE — OUTREACH NOTE
Call Center TCM Note      Responses   [facility] patient discharged from?  [place]   COVID-19 Test Status  Negative   Does the patient have one of the following disease processes/diagnoses(primary or secondary)?  Other   TCM attempt successful?  No   Unsuccessful attempts  Attempt 1          Jovita Schulte RN    7/16/2020, 15:23

## 2020-07-16 NOTE — TELEPHONE ENCOUNTER
"Pt called in requesting to speak with Dr. Velasco. Informed pt, I can make an appt with Dr. Velasco to discuss medication. Dr. Velasco's Schd is booked today with no openings. Pt declined to see NP.     Pt stated he wants to discuss the RX:   Magnesium. Pt could not verify MG. Pt stated he wants to get the med from a \"different supplier.\" Pt did not provide reason, stated he wants to speak with .  Pt is also recommending the RX to be \"time release.\"     Informed pt a nurse will call him back. Please call [phone].  "

## 2020-07-16 NOTE — OUTREACH NOTE
Call Center TCM Note      Responses   [facility] patient discharged from?  [place]   COVID-19 Test Status  Negative   Does the patient have one of the following disease processes/diagnoses(primary or secondary)?  Other   TCM attempt successful?  No   Unsuccessful attempts  Attempt 2          Jovita Schulte RN    7/16/2020, 15:33

## 2020-07-17 ENCOUNTER — TRANSITIONAL CARE MANAGEMENT TELEPHONE ENCOUNTER (OUTPATIENT)
Dept: CALL CENTER | Facility: HOSPITAL | Age: 70
End: 2020-07-17

## 2020-07-17 NOTE — OUTREACH NOTE
Call Center TCM Note      Responses   [facility] patient discharged from?  [place]   COVID-19 Test Status  Negative   Does the patient have one of the following disease processes/diagnoses(primary or secondary)?  Other   TCM attempt successful?  Yes   Call start time  0941   Call end time  0945   Discharge diagnosis  AMAYA, weakness, Vtach   Meds reviewed with patient/caregiver?  Yes   Is the patient having any side effects they believe may be caused by any medication additions or changes?  No   Does the patient have all medications ordered at discharge?  Yes   Is the patient taking all medications as directed (includes completed medication regime)?  Yes   Comments regarding appointments  Appt with ENT 7/21/20,  Appt on 7/30/20 with GI, Dr. Brewster   Does the patient have a primary care provider?   Yes   Does the patient have an appointment with their PCP within 7 days of discharge?  Yes   Comments regarding PCP  7/22/20 at 3:45 pm   Has the patient kept scheduled appointments due by today?  N/A   Pulse Ox monitoring  None   Psychosocial issues?  No   Did the patient receive a copy of their discharge instructions?  Yes   Nursing interventions  Reviewed instructions with patient   What is the patient's perception of their health status since discharge?  Improving   Is the patient/caregiver able to teach back signs and symptoms related to disease process for when to call PCP?  Yes   Is the patient/caregiver able to teach back signs and symptoms related to disease process for when to call 911?  Yes   Is the patient/caregiver able to teach back the hierarchy of who to call/visit for symptoms/problems? PCP, Specialist, Home health nurse, Urgent Care, ED, 911  Yes   If the patient is a current smoker, are they able to teach back resources for cessation?  -- [Nonsmoker]   TCM call completed?  Yes          Charito Arnold RN    7/17/2020, 09:48

## 2020-07-17 NOTE — TELEPHONE ENCOUNTER
Patient states there was not a problem with Magnesium and in good sharp. He appreciated me calling back as well.

## 2020-07-18 LAB — BACTERIA SPEC AEROBE CULT: NORMAL

## 2020-07-19 DIAGNOSIS — L30.9 DERMATITIS: Primary | ICD-10-CM

## 2020-07-20 DIAGNOSIS — D64.9 ANEMIA, UNSPECIFIED TYPE: Primary | ICD-10-CM

## 2020-07-20 RX ORDER — DESOXIMETASONE 2.5 MG/G
CREAM TOPICAL
Qty: 60 G | Refills: 0 | Status: ON HOLD | OUTPATIENT
Start: 2020-07-20 | End: 2020-09-18

## 2020-07-21 ENCOUNTER — OFFICE VISIT (OUTPATIENT)
Dept: OTOLARYNGOLOGY | Age: 70
End: 2020-07-21
Payer: MEDICARE

## 2020-07-21 ENCOUNTER — PROCEDURE VISIT (OUTPATIENT)
Dept: OTOLARYNGOLOGY | Age: 70
End: 2020-07-21
Payer: MEDICARE

## 2020-07-21 VITALS
WEIGHT: 166 LBS | HEIGHT: 72 IN | SYSTOLIC BLOOD PRESSURE: 110 MMHG | BODY MASS INDEX: 22.48 KG/M2 | DIASTOLIC BLOOD PRESSURE: 72 MMHG

## 2020-07-21 PROBLEM — H69.82 CHRONIC DYSFUNCTION OF LEFT EUSTACHIAN TUBE: Status: ACTIVE | Noted: 2020-07-21

## 2020-07-21 PROBLEM — Z96.22 STATUS POST MYRINGOTOMY WITH INSERTION OF TUBE: Status: ACTIVE | Noted: 2020-07-21

## 2020-07-21 PROCEDURE — 1123F ACP DISCUSS/DSCN MKR DOCD: CPT | Performed by: OTOLARYNGOLOGY

## 2020-07-21 PROCEDURE — 4040F PNEUMOC VAC/ADMIN/RCVD: CPT | Performed by: OTOLARYNGOLOGY

## 2020-07-21 PROCEDURE — 92567 TYMPANOMETRY: CPT | Performed by: AUDIOLOGIST

## 2020-07-21 PROCEDURE — 3017F COLORECTAL CA SCREEN DOC REV: CPT | Performed by: OTOLARYNGOLOGY

## 2020-07-21 PROCEDURE — 1036F TOBACCO NON-USER: CPT | Performed by: OTOLARYNGOLOGY

## 2020-07-21 PROCEDURE — 92553 AUDIOMETRY AIR & BONE: CPT | Performed by: AUDIOLOGIST

## 2020-07-21 PROCEDURE — 99213 OFFICE O/P EST LOW 20 MIN: CPT | Performed by: OTOLARYNGOLOGY

## 2020-07-21 PROCEDURE — G8420 CALC BMI NORM PARAMETERS: HCPCS | Performed by: OTOLARYNGOLOGY

## 2020-07-21 PROCEDURE — G8427 DOCREV CUR MEDS BY ELIG CLIN: HCPCS | Performed by: OTOLARYNGOLOGY

## 2020-07-21 RX ORDER — METOPROLOL SUCCINATE 25 MG/1
25 TABLET, EXTENDED RELEASE ORAL DAILY
COMMUNITY
Start: 2020-05-18

## 2020-07-21 RX ORDER — MELATONIN
2000 DAILY
COMMUNITY
Start: 2020-07-16 | End: 2022-03-01

## 2020-07-21 RX ORDER — ALLOPURINOL 100 MG/1
100 TABLET ORAL DAILY
COMMUNITY
Start: 2020-07-16 | End: 2022-03-01

## 2020-07-21 RX ORDER — FAMOTIDINE 20 MG/1
20 TABLET, FILM COATED ORAL DAILY
COMMUNITY
Start: 2020-07-16 | End: 2022-03-01

## 2020-07-21 RX ORDER — FERROUS SULFATE 325(65) MG
325 TABLET ORAL
COMMUNITY
Start: 2020-07-15 | End: 2022-03-01

## 2020-07-21 NOTE — ASSESSMENT & PLAN NOTE
Complains of intermittent fullness in left ear. Tuning forks do not reverse. Overall has had tubes in the past and states that his left ear feels better when there is a tube in place.   Will place new tube at patient's convenience sometime in the next week or 2 following audiometric evaluation

## 2020-07-21 NOTE — PROGRESS NOTES
65 y.o.  male presents today with intermittent fullness in his left ear. Apparently he has had tubes in the past.  He is a former patient of Dr. Gen Crane who last placed a tube in 2018. The patient states that while the tube is in place the ear feels fine but he has become aware of occasional fullness in the left ear and feels the tube is no longer functional.  Apparently prior to coming to [facility] he had been a patient at [facility]    Family History   Problem Relation Age of Onset    Colon Cancer Neg Hx     Colon Polyps Neg Hx     Esophageal Cancer Neg Hx     Liver Disease Neg Hx     Liver Cancer Neg Hx     Rectal Cancer Neg Hx     Stomach Cancer Neg Hx      Social History     Socioeconomic History    Marital status:      Spouse name: None    Number of children: None    Years of education: None    Highest education level: None   Occupational History    None   Social Needs    Financial resource strain: None    Food insecurity     Worry: None     Inability: None    Transportation needs     Medical: None     Non-medical: None   Tobacco Use    Smoking status: Former Smoker     Packs/day: 1.00     Years: 12.00     Pack years: 12.00     Last attempt to quit: 3/10/2013     Years since quittin.3    Smokeless tobacco: Never Used   Substance and Sexual Activity    Alcohol use:  Yes     Alcohol/week: 2.0 standard drinks     Types: 2 Cans of beer per week     Comment: DAILY    Drug use: No    Sexual activity: None   Lifestyle    Physical activity     Days per week: None     Minutes per session: None    Stress: None   Relationships    Social connections     Talks on phone: None     Gets together: None     Attends Restorationist service: None     Active member of club or organization: None     Attends meetings of clubs or organizations: None     Relationship status: None    Intimate partner violence     Fear of current or ex partner: None     Emotionally abused: None     Physically abused: None     Forced sexual activity: None   Other Topics Concern    None   Social History Narrative    None     Past Medical History:   Diagnosis Date    Arthritis     Asthma     BPPV (benign paroxysmal positional vertigo)     CAD (coronary artery disease)     Colon polyps     Colon polyps     Dizziness     History of alcoholism (HCC)     Hyperlipidemia     Hypertension     Low sodium levels     Otitis     Pharyngitis     Proteinuria     Sebaceous cyst     Sinusitis     URI (upper respiratory infection)      Past Surgical History:   Procedure Laterality Date    COLONOSCOPY  ? 2010    ? Doctor or where    COLONOSCOPY N/A 3/10/2016    Dr Harper Phan AP x 2 (-) dysplasia, BCM x 2, 5 yr recall    CORONARY ARTERY BYPASS GRAFT      CYST REMOVAL      SEBACEOUS    JOINT REPLACEMENT      RTH    TONSILLECTOMY AND ADENOIDECTOMY      TYMPANOSTOMY TUBE PLACEMENT Left          REVIEW OF SYSTEMS:  all other systems reviewed and are negative  General Health: no change in health status since last visit  Ears: ear popping/ fullness Yes  Left episodic  Hearing: no change Left       Comments:     PHYSICAL EXAM:    /72   Ht 6' (1.829 m)   Wt 166 lb (75.3 kg)   BMI 22.51 kg/m²   Body mass index is 22.51 kg/m². General Appearance: well developed , well nourished and no distress  Head/ Face: normocephalic and atraumatic  Vocal Quality: good/ normal  Ears: Right Ear: External: external ears normal Otoscopy Ear Canal: canal clear Otoscopy TM: TM's normal Left Ear: External: external ears normal Otoscopy Ear Canal: canal clear Otoscopy TM: TM's mobile and TM's dull  Hearing: Rinne A>B: Left, Rinne A>B: Right, Vivar M and see audiogram  Psych/ Mood: cooperative and no depression, anxiety or agitation    Assessment & Plan:    Problem List Items Addressed This Visit        ENT Problems    Chronic dysfunction of left eustachian tube     Complains of intermittent fullness in left ear.   Tuning forks do not reverse. Overall has had tubes in the past and states that his left ear feels better when there is a tube in place. Will place new tube at patient's convenience sometime in the next week or 2 following audiometric evaluation         Asymmetrical hearing loss of left ear     Bilateral sensorineural hearing loss with significant asymmetry. Hearing levels markedly depressed and mid and higher frequencies in left ear. Asymmetry present in audiogram from 2017 from [facility]. Other    Status post myringotomy with insertion of tube     Left myringotomy tube 8/6/2018 per Dr. Mitchell Blades has extruded. Underlying TM has healed. Tuning forks do not reverse. No orders of the defined types were placed in this encounter. No orders of the defined types were placed in this encounter. Please note that this chart was generated using dragon dictation software. Although every effort was made to ensure the accuracy of this automated transcription, some errors in transcription may have occurred.

## 2020-07-21 NOTE — ASSESSMENT & PLAN NOTE
Left myringotomy tube 8/6/2018 per Dr. Tabby Washburn has extruded. Underlying TM has healed. Tuning forks do not reverse.

## 2020-07-22 ENCOUNTER — OFFICE VISIT (OUTPATIENT)
Dept: INTERNAL MEDICINE | Facility: CLINIC | Age: 70
End: 2020-07-22

## 2020-07-22 VITALS
SYSTOLIC BLOOD PRESSURE: 130 MMHG | OXYGEN SATURATION: 99 % | TEMPERATURE: 97.9 F | DIASTOLIC BLOOD PRESSURE: 62 MMHG | HEIGHT: 72 IN | BODY MASS INDEX: 22.35 KG/M2 | HEART RATE: 53 BPM | WEIGHT: 165 LBS

## 2020-07-22 DIAGNOSIS — N28.9 RENAL INSUFFICIENCY: ICD-10-CM

## 2020-07-22 DIAGNOSIS — N17.9 AKI (ACUTE KIDNEY INJURY) (HCC): ICD-10-CM

## 2020-07-22 DIAGNOSIS — R60.0 BILATERAL LEG EDEMA: ICD-10-CM

## 2020-07-22 DIAGNOSIS — R53.1 WEAKNESS: Primary | ICD-10-CM

## 2020-07-22 DIAGNOSIS — R52 ACHES: ICD-10-CM

## 2020-07-22 LAB — REF LAB TEST METHOD: NORMAL

## 2020-07-22 PROCEDURE — 99496 TRANSJ CARE MGMT HIGH F2F 7D: CPT | Performed by: NURSE PRACTITIONER

## 2020-07-22 RX ORDER — FUROSEMIDE 20 MG/1
20 TABLET ORAL DAILY
Qty: 90 TABLET | Refills: 3 | Status: ON HOLD | OUTPATIENT
Start: 2020-07-22 | End: 2020-08-22 | Stop reason: SDUPTHER

## 2020-07-22 RX ORDER — FLUTICASONE PROPIONATE 50 MCG
2 SPRAY, SUSPENSION (ML) NASAL DAILY PRN
COMMUNITY

## 2020-07-22 RX ORDER — ACETAMINOPHEN, ASPIRIN AND CAFFEINE 250; 250; 65 MG/1; MG/1; MG/1
1 TABLET, FILM COATED ORAL EVERY 6 HOURS PRN
COMMUNITY
End: 2020-09-22 | Stop reason: HOSPADM

## 2020-07-22 RX ORDER — MELATONIN 10 MG
2 CAPSULE ORAL NIGHTLY
COMMUNITY

## 2020-07-22 RX ORDER — OMEPRAZOLE 20 MG/1
20 CAPSULE, DELAYED RELEASE ORAL DAILY
COMMUNITY
End: 2020-12-22 | Stop reason: SDUPTHER

## 2020-07-22 NOTE — PROGRESS NOTES
Transitional Care Follow Up Visit  Subjective     Ricardo PITER Hugo is a 70 y.o. male who presents for a transitional care management visit.    Within 48 business hours after discharge our office contacted him via telephone to coordinate his care and needs.      I reviewed and discussed the details of that call along with the discharge summary, hospital problems, inpatient lab results, inpatient diagnostic studies, and consultation reports with Ricardo.     Current outpatient and discharge medications have been reconciled for the patient.  Reviewed by: STERLING Zambrano      Date of TCM Phone Call 7/15/2020   [facility]   Date of Admission 7/11/2020   Date of Discharge 7/15/2020   Discharge Disposition Home or Self Care     Risk for Readmission (LACE) Score: 8 (7/15/2020  5:00 AM)      Mr. Hugo is a pleasant 70-year-old male who presents to the office for a hospital follow-up 1 week after E. coli bacteremia admission.  Patient reports that he had been doing well overall but for the last 3 to 4 days he is feeling achy and having pain from his neck to above his knees.  Patient reports that the edema in his legs have worsened within the last 3 days as well.  Patient reports that worst pain is in his knees however does state that pain is an achy and dull.  He reports his pain ranges from a 3-4 out of 10 up to 6-7 out of 10.  Patient reports pain is worse when he is lying down.  Patient denies symptoms of chest pain, shortness of breath, fever, chills, night sweats.  Patient denies urinary symptoms.  Patient denies hematuria or episodes of diarrhea or changes in stool color or consistency.  Patient denies abdominal pain.  Patient bowel sounds are active and denies pain with light and deep palpation on abdomen.  Patient leg edema is bilateral and focus more on the ankles to midcalf.  Patient's swelling is 3+ pitting edema.  Patient denies pain or tenderness and swelling is night unilateral, I read,  "pulses are palpable.  Patient denies numbness, tingling, weakness in upper or lower extremities.  Patient states \"my knees just ache a lot and occasionally my neck and back will as well \".  I reviewed the patient's labs and discussed medications that were changed at discharge.  Patient reported understanding the changes.  Patient is taking medication as prescribed and denies side effects with current therapy.  Patient's blood pressure is controlled with current hypertensive medications.  He denies any arrhythmias and patient's heart rate today is regular.  I do hear a murmur grade 1 out of 6.  Patient was seen by nephrology during hospitalization and order requested for them to follow-up in 1 week after discharge.  Will place order for nephrology consult, patient reports that no consult has been made for him at this time.  Patient is requesting to see Dr. Velasco within 1 to 2 weeks after our follow-up today.       Course During Hospital Stay: Mr. Hugo is a pleasant 70-year-old  male who follows Dr. Joe Velasco for primary care.  He has a medical history significant for hypertension, coronary artery disease status post coronary artery bypass grafting, and GERD.  The patient presented to the [facility] emergency department on 7/11/2020 with a 4-month progressive history of weakness.  The patient had had complaints of a poor appetite with a 7 pound weight loss during this timeframe.  He had diarrhea waxing and waning in intensity.  He underwent an outpatient colonoscopy on 7/2 per Dr. Brewster.  This showed hemosiderin staining in the distal colon, question resolving or ongoing colitis.  Multiple polyps were removed, several of which showed mild active inflammation on pathology.  Patient was prescribed Medrol Dosepak.  In the emergency department, patient was noted to have an acute kidney injury with BUN of 94 and creatinine of 4.38.  Sodium level is 129.  White blood cell count was 19,000.  " Urinalysis showed large leuk esterase, too numerous to count white blood cells, and 4+ bacteria.  CT of the abdomen and pelvis did not show showed avascular necrosis of the left femoral head without any acute findings.  The patient was admitted to the hospitalist service for further evaluation and management.     No previously known history of chronic kidney disease.  All available previous laboratory studies showed a creatinine ranging from 0.7-1.3.  The patient was given IV fluids and evaluated by nephrology on admission.  His renal function has continued to improve daily, albeit slowly.  Today his creatinine is 3.1 and BUN 43.  His valsartan will continue to be held at time of discharge.  Blood pressure is presently well controlled.  This can be resumed on an outpatient basis if needed if his renal function continues to improve/stabilizes.  A renal ultrasound was performed, which was negative.  The patient has been cleared for discharge from a nephrology standpoint.     The patient was placed on Rocephin on admission.  Both urine and blood cultures showed growth of E. coli, which are susceptible to cephalosporin therapy.  A repeat blood culture obtained on 7/13 is negative thus far.  The patient will be converted to oral Omnicef to complete a total 14 days of antibiotic therapy from time of negative culture.  His white blood cell count has improved daily and has normalized as of today.     The patient has had a recent colonoscopy as described above for his diarrhea.  A GI panel was checked which was negative.  His Protonix was discontinued in favor of Pepcid.  We did trial cholestyramine, and the patient's diarrhea has seemed to resolve.  We will provide patient with a prescription for cholestyramine to use as needed.  He does have an upcoming appointment in the next 2 weeks with Dr. Brewster to discuss the possibility of Crohn's disease.     The patient was evaluated by cardiology on 7/14, as he did have a 39 beat  run of nonsustained V. tach noted on telemetry.  Patient was asleep during this episode without symptoms.  A transthoracic echocardiogram was performed, which showed grade 2 diastolic dysfunction with EF of 61 to 65%.  This showed moderate pulmonary hypertension.  An overnight pulse oximetry was conducted, which was unremarkable.     Overall, the patient is hemodynamically stable and appropriate for discharge home today.  He was evaluated by physical and Occupational Therapy, who do not identify any needs for home health at this time.  The patient will follow-up with his primary care provider and with nephrology in 1 week.  We will have a BMP checked prior to these appointments.  He will follow-up with cardiology in 3 months.  He will follow-up with orthopedic surgery as an outpatient for evaluation of avascular necrosis noted on CT.     The following portions of the patient's history were reviewed and updated as appropriate: allergies, current medications, past family history, past medical history, past social history, past surgical history and problem list.    Review of Systems   Constitutional: Negative for activity change, appetite change, fatigue, fever and unexpected weight change.   HENT: Negative for congestion, ear discharge, ear pain, hearing loss, postnasal drip, rhinorrhea, sinus pressure, tinnitus, trouble swallowing and voice change.    Eyes: Negative for discharge and visual disturbance.   Respiratory: Negative for apnea, cough and shortness of breath.    Cardiovascular: Positive for leg swelling. Negative for chest pain and palpitations.   Gastrointestinal: Negative for abdominal pain, blood in stool, constipation and rectal pain.   Endocrine: Negative for cold intolerance, heat intolerance, polydipsia and polyphagia.   Genitourinary: Negative for decreased urine volume, difficulty urinating, frequency, hematuria and urgency.   Musculoskeletal: Positive for arthralgias, back pain, myalgias and neck  pain. Negative for neck stiffness.   Skin: Negative for color change, rash and wound.   Allergic/Immunologic: Negative for environmental allergies.   Neurological: Positive for weakness. Negative for dizziness, speech difficulty, numbness and headaches.   Hematological: Negative for adenopathy. Does not bruise/bleed easily.   Psychiatric/Behavioral: Negative for agitation, confusion, decreased concentration and sleep disturbance. The patient is not nervous/anxious.        Objective   Physical Exam   Constitutional: He is oriented to person, place, and time. He appears well-developed and well-nourished.   HENT:   Head: Normocephalic and atraumatic.   Mouth/Throat: Oropharynx is clear and moist.   Eyes: Pupils are equal, round, and reactive to light. EOM are normal.   Neck: Normal range of motion. Neck supple.   Cardiovascular: Normal rate, regular rhythm and intact distal pulses.   Murmur heard.   Systolic murmur is present with a grade of 1/6.  Pulmonary/Chest: Effort normal and breath sounds normal.   Abdominal: Soft. Bowel sounds are normal. There is no tenderness. A hernia is present. Hernia confirmed positive in the right inguinal area.   Genitourinary:   Genitourinary Comments: Deferred exam   Musculoskeletal:        Right knee: He exhibits decreased range of motion. Tenderness found. Medial joint line and lateral joint line tenderness noted.        Left knee: He exhibits decreased range of motion. Tenderness found. Medial joint line and lateral joint line tenderness noted.        Right ankle: He exhibits swelling. No tenderness.        Left ankle: He exhibits swelling. No tenderness.        Cervical back: He exhibits bony tenderness. He exhibits normal range of motion.        Lumbar back: He exhibits tenderness. He exhibits normal range of motion and no spasm.        Right lower leg: He exhibits swelling. He exhibits no tenderness.        Left lower leg: He exhibits swelling.   Lymphadenopathy:     He has no  cervical adenopathy.     He has no axillary adenopathy.        Right: No inguinal adenopathy present.        Left: No inguinal adenopathy present.   Neurological: He is alert and oriented to person, place, and time. He has normal strength. He displays a negative Romberg sign.   Skin: Skin is warm and dry. No lesion and no rash noted.   Psychiatric: He has a normal mood and affect. His speech is normal and behavior is normal.   Nursing note and vitals reviewed.      Assessment/Plan   Ricardo was seen today for transitional care management.    Diagnoses and all orders for this visit:    Weakness  -     CBC & Differential  -     Urinalysis With Culture If Indicated - Urine, Clean Catch    Renal insufficiency  -     Comprehensive Metabolic Panel  -     Urinalysis With Culture If Indicated - Urine, Clean Catch    Aches  -     Urinalysis With Culture If Indicated - Urine, Clean Catch    AMAYA (acute kidney injury) (CMS/HCC)    Bilateral leg edema        Will collect a CBC and a CMP on patient today to determine if he has any anemia or persistent acute kidney injury.  Regardless patient will be referred to nephrology today for follow-up and monitoring after bacteremia.  Am going to recheck a urinalysis today related to weakness and body aches.  Will send the urine for culture if indicated.  Patient is requesting to see Dr. Velasco in 1 to 2 weeks to discuss his hospitalization in person.  Advised patient if he started to experience chest pain shortness of breath severe intensity of pain to go back to the ER for assessment and monitoring.  Patient reported understanding this

## 2020-07-23 ENCOUNTER — READMISSION MANAGEMENT (OUTPATIENT)
Dept: CALL CENTER | Facility: HOSPITAL | Age: 70
End: 2020-07-23

## 2020-07-23 LAB
ALBUMIN SERPL-MCNC: 2.9 G/DL (ref 3.5–5.2)
ALBUMIN/GLOB SERPL: 0.7 G/DL
ALP SERPL-CCNC: 220 U/L (ref 39–117)
ALT SERPL-CCNC: 21 U/L (ref 1–41)
AST SERPL-CCNC: 18 U/L (ref 1–40)
BASOPHILS # BLD AUTO: ABNORMAL 10*3/UL
BILIRUB SERPL-MCNC: 0.3 MG/DL (ref 0–1.2)
BUN SERPL-MCNC: 43 MG/DL (ref 8–23)
BUN/CREAT SERPL: 16.3 (ref 7–25)
CALCIUM SERPL-MCNC: 8.8 MG/DL (ref 8.6–10.5)
CHLORIDE SERPL-SCNC: 97 MMOL/L (ref 98–107)
CO2 SERPL-SCNC: 21 MMOL/L (ref 22–29)
CREAT SERPL-MCNC: 2.63 MG/DL (ref 0.76–1.27)
DIFFERENTIAL COMMENT: ABNORMAL
EOSINOPHIL # BLD AUTO: ABNORMAL 10*3/UL
EOSINOPHIL # BLD MANUAL: 0.44 10*3/MM3 (ref 0–0.4)
EOSINOPHIL NFR BLD AUTO: ABNORMAL %
EOSINOPHIL NFR BLD MANUAL: 4.1 % (ref 0.3–6.2)
ERYTHROCYTE [DISTWIDTH] IN BLOOD BY AUTOMATED COUNT: 13.2 % (ref 12.3–15.4)
GLOBULIN SER CALC-MCNC: 4.3 GM/DL
GLUCOSE SERPL-MCNC: 94 MG/DL (ref 65–99)
HCT VFR BLD AUTO: 31.4 % (ref 37.5–51)
HGB BLD-MCNC: 9.9 G/DL (ref 13–17.7)
LYMPHOCYTES # BLD AUTO: ABNORMAL 10*3/UL
LYMPHOCYTES # BLD MANUAL: 1.34 10*3/MM3 (ref 0.7–3.1)
LYMPHOCYTES NFR BLD AUTO: ABNORMAL %
LYMPHOCYTES NFR BLD MANUAL: 12.4 % (ref 19.6–45.3)
MCH RBC QN AUTO: 32.8 PG (ref 26.6–33)
MCHC RBC AUTO-ENTMCNC: 31.5 G/DL (ref 31.5–35.7)
MCV RBC AUTO: 104 FL (ref 79–97)
MONOCYTES # BLD MANUAL: 1.34 10*3/MM3 (ref 0.1–0.9)
MONOCYTES NFR BLD AUTO: ABNORMAL %
MONOCYTES NFR BLD MANUAL: 12.4 % (ref 5–12)
NEUTROPHILS # BLD MANUAL: 7.71 10*3/MM3 (ref 1.7–7)
NEUTROPHILS NFR BLD AUTO: ABNORMAL %
NEUTROPHILS NFR BLD MANUAL: 71.1 % (ref 42.7–76)
NRBC BLD AUTO-RTO: 0 /100 WBC (ref 0–0.2)
PLATELET # BLD AUTO: 263 10*3/MM3 (ref 140–450)
PLATELET BLD QL SMEAR: ABNORMAL
POTASSIUM SERPL-SCNC: 4.9 MMOL/L (ref 3.5–5.2)
PROT SERPL-MCNC: 7.2 G/DL (ref 6–8.5)
RBC # BLD AUTO: 3.02 10*6/MM3 (ref 4.14–5.8)
RBC MORPH BLD: ABNORMAL
SODIUM SERPL-SCNC: 133 MMOL/L (ref 136–145)
WBC # BLD AUTO: 10.84 10*3/MM3 (ref 3.4–10.8)

## 2020-07-23 NOTE — OUTREACH NOTE
Medical Week 2 Survey      Responses   [facility] patient discharged from?  [place]   COVID-19 Test Status  Negative   Does the patient have one of the following disease processes/diagnoses(primary or secondary)?  Other   Week 2 attempt successful?  Yes   Call start time  1105   Discharge diagnosis  AMAYA, weakness, Vtach   Call end time  1116   Person spoke with today (if not patient) and relationship  friend, Ryan   Myra reviewed with patient/caregiver?  Yes   Is the patient having any side effects they believe may be caused by any medication additions or changes?  No   Does the patient have all medications ordered at discharge?  Yes   Is the patient taking all medications as directed (includes completed medication regime)?  Yes   Does the patient have a primary care provider?   Yes   Does the patient have an appointment with their PCP within 7 days of discharge?  Yes   Has the patient kept scheduled appointments due by today?  Yes   Has home health visited the patient within 72 hours of discharge?  N/A   Pulse Ox monitoring  None   Psychosocial issues?  No   Did the patient receive a copy of their discharge instructions?  Yes   Nursing interventions  Reviewed instructions with patient   What is the patient's perception of their health status since discharge?  Improving   Is the patient/caregiver able to teach back signs and symptoms related to disease process for when to call PCP?  Yes   Is the patient/caregiver able to teach back signs and symptoms related to disease process for when to call 911?  Yes   Is the patient/caregiver able to teach back the hierarchy of who to call/visit for symptoms/problems? PCP, Specialist, Home health nurse, Urgent Care, ED, 911  Yes   Additional teach back comments  Ryan reports pt having edema in feet and ankles, was seen by PCP PA yesterday, no action taken, advised Ryan to have pt weigh daily to track excessive water weight gain and keep track of data, pt  will be seeing PCP again soon, agreed with plan   Week 2 Call Completed?  Yes          Elvira Barakat RN

## 2020-07-24 ENCOUNTER — CLINICAL SUPPORT (OUTPATIENT)
Dept: INTERNAL MEDICINE | Facility: CLINIC | Age: 70
End: 2020-07-24

## 2020-07-24 DIAGNOSIS — D51.0 PERNICIOUS ANEMIA: ICD-10-CM

## 2020-07-24 LAB
FOLATE SERPL-MCNC: 7.14 NG/ML (ref 4.78–24.2)
IRON SATN MFR SERPL: 22 % (ref 20–50)
IRON SERPL-MCNC: 62 MCG/DL (ref 59–158)
Lab: NORMAL
TIBC SERPL-MCNC: 281 MCG/DL
UIBC SERPL-MCNC: 219 MCG/DL (ref 112–346)
VIT B12 SERPL-MCNC: 1603 PG/ML (ref 211–946)
WRITTEN AUTHORIZATION: NORMAL

## 2020-07-24 PROCEDURE — 96372 THER/PROPH/DIAG INJ SC/IM: CPT | Performed by: INTERNAL MEDICINE

## 2020-07-24 NOTE — ASSESSMENT & PLAN NOTE
Bilateral sensorineural hearing loss with significant asymmetry. Hearing levels markedly depressed and mid and higher frequencies in left ear. Asymmetry present in audiogram from 2017 from [facility].

## 2020-07-25 LAB
APPEARANCE UR: CLEAR
BACTERIA #/AREA URNS HPF: ABNORMAL /HPF
BACTERIA UR CULT: NORMAL
BACTERIA UR CULT: NORMAL
BILIRUB UR QL STRIP: NEGATIVE
COLOR UR: YELLOW
CRYSTALS URNS MICRO: ABNORMAL
EPI CELLS #/AREA URNS HPF: ABNORMAL /HPF (ref 0–10)
GLUCOSE UR QL: NEGATIVE
HGB UR QL STRIP: ABNORMAL
KETONES UR QL STRIP: NEGATIVE
LEUKOCYTE ESTERASE UR QL STRIP: ABNORMAL
MICRO URNS: ABNORMAL
MUCOUS THREADS URNS QL MICRO: PRESENT /HPF
NITRITE UR QL STRIP: NEGATIVE
PH UR STRIP: 6 [PH] (ref 5–7.5)
PROT UR QL STRIP: NEGATIVE
RBC #/AREA URNS HPF: ABNORMAL /HPF (ref 0–2)
SP GR UR: 1.01 (ref 1–1.03)
UNIDENT CRYS URNS QL MICRO: PRESENT /LPF
URINALYSIS REFLEX: ABNORMAL
UROBILINOGEN UR STRIP-MCNC: 0.2 MG/DL (ref 0.2–1)
WBC #/AREA URNS HPF: >30 /HPF (ref 0–5)

## 2020-07-30 ENCOUNTER — OFFICE VISIT (OUTPATIENT)
Dept: GASTROENTEROLOGY | Facility: CLINIC | Age: 70
End: 2020-07-30

## 2020-07-30 VITALS
SYSTOLIC BLOOD PRESSURE: 120 MMHG | TEMPERATURE: 97.7 F | DIASTOLIC BLOOD PRESSURE: 52 MMHG | WEIGHT: 156 LBS | HEIGHT: 72 IN | BODY MASS INDEX: 21.13 KG/M2 | HEART RATE: 63 BPM | OXYGEN SATURATION: 94 %

## 2020-07-30 DIAGNOSIS — K50.10 CROHN'S DISEASE OF LARGE INTESTINE WITHOUT COMPLICATION (HCC): ICD-10-CM

## 2020-07-30 DIAGNOSIS — R19.7 DIARRHEA, UNSPECIFIED TYPE: Primary | ICD-10-CM

## 2020-07-30 PROCEDURE — 99213 OFFICE O/P EST LOW 20 MIN: CPT | Performed by: INTERNAL MEDICINE

## 2020-07-30 RX ORDER — MESALAMINE 0.38 G/1
375 CAPSULE, EXTENDED RELEASE ORAL DAILY
Qty: 120 CAPSULE | Refills: 11 | Status: SHIPPED | OUTPATIENT
Start: 2020-07-30 | End: 2020-08-22 | Stop reason: HOSPADM

## 2020-07-30 NOTE — PROGRESS NOTES
[facility] Gastroenterology    Chief Complaint   Patient presents with   • Diarrhea       Subjective     HPI    Ricardo Hugo is a 70 y.o. male who presents with a chief complaint of possible Crohn's    He states his diarrhea started in January of February.  At that time he ran out of his slow magnesium supplement that he was on for 10 years.  So I got an over-the-counter magnesium supplement of 500 mg.  He started having diarrhea.  He has since switched back to Slow-Mag but his diarrhea persisted.    He had a colonoscopy July 2.  This noted some hemosiderin staining throughout the colon.  Several small polyps removed.  Biopsies of throughout the colon did show mild to minimal microscopic inflammation.  [facility] studies were also obtained and they suggest underlying Crohn's disease.  He was placed on a Medrol Dosepak post colonoscopy.    Stool studies have been negative including gastrointestinal panel and C. difficile.  He states the diarrhea is improved.  The Medrol Dosepak helped he believes but is not certain.  As he ended up in the hospital in July 11.  When he was discharged from the hospital the placed him on Questran.  He is taken 1 packet a day.  He states he now has 1 loose stool every other morning.  Stools are not formed.  There is no blood or mucus.  He denies abdominal pain.  He may have a bowel movement 2 days in a row but that he will go back to every other day.  He is not having nocturnal bowel movements.  He does complain of some myalgias and arthralgias.  He is not taking any NSAIDs.      He was admitted to the hospital July 11-15.  He was admitted with weakness.  He was found to have E. coli bacteremia.  Acute renal failure with a creatinine above 4.  CT scan abdomen pelvis showed avascular necrosis of the hip.  No got inflammatory changes.  He was hydrated and improved.  I see in the hospital that he was given cholestyramine for his diarrhea and this reportedly helped and was discharged  with a prescription for cholestyramine.  He also went through echocardiogram noting some diastolic dysfunction.    He did follow-up with primary care on July 22.  Hemoglobin is 9.9 but he was not iron deficient.  Albumin had increased to 2.9 from a low of 2.4.  Creatinine had improved to 2.63 from 3.14 when he was discharged on July 15.  He is supposed to go back and see nephrology he states.  He does not know when his appointment is.  He is due to see Dr. Neftali Velasco again August 11.      Past Medical History:   Diagnosis Date   • Acid reflux    • Allergic rhinitis    • Chronic mucoid otitis media    • Chronic rhinitis    • Chronic sinusitis    • Coronary artery disease     HEART BYPASS 2004   • Eustachian tube dysfunction    • Heart disease    • Hypertension    • Mixed hearing loss of left ear    • Perianal abscess    • Sensorineural hearing loss    • Tinnitus        Past Surgical History:   Procedure Laterality Date   • COLONOSCOPY N/A 7/2/2020    Procedure: COLONOSCOPY WITH ANESTHESIA;  Surgeon: Adrien Brewster MD;  Location: [facility] ENDOSCOPY;  Service: Gastroenterology;  Laterality: N/A;  pre op: diarrhea  post op: polyps  PCP: Joe Velasco MD   • CORONARY ARTERY BYPASS GRAFT     • INCISION AND DRAINAGE PERIRECTAL ABSCESS N/A 3/3/2017    Procedure: INCISION AND DRAINAGE OF JEET ANAL ABSCESS;  Surgeon: Lynette Smith MD;  Location: [facility] OR;  Service:    • MYRINGOTOMY W/ TUBES Left 04/17/2017    06/10/2016   • TONSILLECTOMY     • TOTAL HIP ARTHROPLASTY           Current Outpatient Medications:   •  albuterol (PROVENTIL HFA;VENTOLIN HFA) 108 (90 BASE) MCG/ACT inhaler, Inhale 2 puffs 4 (Four) Times a Day As Needed for Wheezing or Shortness of Air., Disp: , Rfl:   •  amLODIPine (NORVASC) 10 MG tablet, Take 10 mg by mouth Daily., Disp: , Rfl:   •  aspirin (ASPIR) 81 MG EC tablet, Take 81 mg by mouth Daily., Disp: , Rfl:   •  aspirin-acetaminophen-caffeine (Excedrin Menstrual Complete) 250-250-65 MG per  tablet, Take 1 tablet by mouth Every 6 (Six) Hours As Needed for Headache., Disp: , Rfl:   •  azelastine (ASTELIN) 0.1 % nasal spray, 1 spray into the nostril(s) as directed by provider 2 (Two) Times a Day As Needed for Rhinitis., Disp: , Rfl:   •  cholestyramine (QUESTRAN) 4 g packet, Take 1 packet by mouth Daily As Needed (Diarrhea)., Disp: 60 each, Rfl: 0  •  CloNIDine (CATAPRES) 0.2 MG tablet, Take 0.2 mg by mouth 3 (Three) Times a Day., Disp: , Rfl:   •  diphenoxylate-atropine (LOMOTIL) 2.5-0.025 MG per tablet, Take 1 tablet by mouth 4 (Four) Times a Day As Needed for Diarrhea., Disp: , Rfl:   •  fluticasone (FLONASE) 50 MCG/ACT nasal spray, 2 sprays into the nostril(s) as directed by provider As Needed for Rhinitis., Disp: , Rfl:   •  magnesium oxide (MAGOX) 400 (241.3 Mg) MG tablet tablet, Take 1 tablet by mouth Daily., Disp: 30 each, Rfl: 2  •  Melatonin 10 MG capsule, Take 2 capsules by mouth Daily., Disp: , Rfl:   •  metoprolol succinate XL (TOPROL-XL) 25 MG 24 hr tablet, Take 1 tablet by mouth Daily., Disp: 30 tablet, Rfl: 5  •  Multiple Vitamins-Minerals (PRESERVISION/LUTEIN) capsule, Take 1 capsule by mouth 2 (two) times a day., Disp: , Rfl:   •  omeprazole (priLOSEC) 20 MG capsule, Take 20 mg by mouth Daily., Disp: , Rfl:   •  allopurinol (ZYLOPRIM) 100 MG tablet, Take 1 tablet by mouth Daily., Disp: 30 tablet, Rfl: 2  •  cholecalciferol (VITAMIN D3) 25 MCG (1000 UT) tablet, Take 2 tablets by mouth Daily., Disp: 60 tablet, Rfl: 2  •  desoximetasone (TOPICORT) 0.25 % cream, APPLY EXTERNALLY TO THE AFFECTED AREA TWICE DAILY AS DIRECTED, Disp: 60 g, Rfl: 0  •  famotidine (PEPCID) 20 MG tablet, Take 1 tablet by mouth Daily., Disp: 30 tablet, Rfl: 2  •  fexofenadine (ALLEGRA) 180 MG tablet, Take 180 mg by mouth Daily., Disp: , Rfl:   •  furosemide (LASIX) 20 MG tablet, TAKE 1 TABLET BY MOUTH DAILY, Disp: 90 tablet, Rfl: 3  •  mesalamine (APRISO) 0.375 g 24 hr capsule, Take 1 capsule by mouth Daily., Disp:  120 capsule, Rfl: 11    Current Facility-Administered Medications:   •  cyanocobalamin injection 1,000 mcg, 1,000 mcg, Intramuscular, Q28 Days, Joe Velasco MD, 1,000 mcg at 07/16/20 1205    Allergies   Allergen Reactions   • Lortab [Hydrocodone-Acetaminophen] Other (See Comments) and Hallucinations     CLOSTROPHOBIC       Social History     Socioeconomic History   • Marital status:      Spouse name: Not on file   • Number of children: Not on file   • Years of education: Not on file   • Highest education level: Not on file   Tobacco Use   • Smoking status: Former Smoker   • Smokeless tobacco: Never Used   • Tobacco comment: quit 2010   Substance and Sexual Activity   • Alcohol use: Yes     Alcohol/week: 14.0 standard drinks     Types: 14 Cans of beer per week     Comment: occ   • Drug use: No   • Sexual activity: Defer       Family History   Problem Relation Age of Onset   • No Known Problems Mother    • No Known Problems Father    • Colon cancer Neg Hx    • Colon polyps Neg Hx        Review of Systems  General no fever chills or sweats weight stable  Gastrointestinal: Not present-abdominal pain, constipation, dysphagia, hematemesis, melena, odynophagia, nausea, vomiting, pyrosis, regurgitation, hematochezia,    Objective     Vitals:    07/30/20 1252   BP: 120/52   Pulse: 63   Temp: 97.7 °F (36.5 °C)   SpO2: 94%       Physical Exam  No acute distress. Vital signs as documented. Skin warm and dry and without overt rashes. EOMI, sclera anicteric.  Neck without JVD or masses. Lungs clear to auscultation bilaterally, no rales. Heart exam notable for regular rhythm, normal sounds and absence of loud murmurs, rubs or gallops. Abdomen is soft, nontender, non distended, normal bowel sounds and without evidence of organomegaly, masses, or abdominal aortic enlargement. Extremities nonedematous, no cyanosis. Neuro alert, moves extremities.        Assessment/Plan   Problem List Items Addressed This Visit         Digestive    Diarrhea - Primary    Crohn's disease of large intestine without complication (CMS/[facility])    Overview     Colonoscopy July 2020 revealed mild patchy scattered hemosiderin staining with inflammation more so in rectosigmoid area.  [facility] lab IBD first step consistent with Crohn's                 We had a long conversation.  His biopsies were consistent with inflammation and [facility] studies were consistent with Crohn's disease.  The findings on colonoscopy were subtle.  It was not definitely obvious Crohn's because of the subtle nature of the inflammatory changes.  Infectious etiologies studies have been negative.  I therefore informed him that I suspect he may have developed Crohn's.  As far as Crohn's disease he is relatively asymptomatic at this time with a loose bowel movement every other day.  I did discuss with him that his arthralgias may be related to inflammatory bowel disease but I cannot state that something else is not causing his discomfort.  From his description he is also having myalgias.  He is going to follow-up with his primary care in the near future.    From a GI standpoint, regarding potential Crohn's, we discussed treatment options.  We discussed 5 ASA products, steroids, immunosuppression such as Imuran, biologic such as Humira or Remicade, and surgical intervention.  We talked about the side effects of each, cost and benefits.  We talked about concerns about immunosuppressive and biologic agents and a coronavirus pandemic environment.  Many questions were answered.    At this time, with a history of avascular necrosis of the hip, I do not recommend steroid use.  He is not ready to step up to immunosuppressive drugs given potential side effects and the coronavirus pandemic.  I understand that I think that is reasonable since he is relatively asymptomatic.  5-ASA products may not be beneficial for Crohn's as discussed.  He did have minimal inflammatory changes confined to the  colon only, thus, given the minimal side effect profile compared to the others he would like to give this a try.  I think this is reasonable especially since he has multiple other ongoing acute medical problems at this time..  Therefore we will place him on Apriso 4 tablets daily.  He can continue with the Questran.  We will see him back in the office in 6 to 8 weeks approximately.  Sooner if he has any increase in his symptoms or concerns.  We can reassess then.  He is in agreement with this approach.    Continue ongoing management by primary care provider and other specialists.     Patient's Body mass index is 21.16 kg/m². BMI is within normal parameters. No follow-up required..        EMR Dragon/transcription disclaimer:  Much of this encounter note is electronic transcription/translation of spoken language to printed text.  The electronic translation of spoken language may be erroneous, or at times, nonsensical words or phrases may be inadvertently transcribed.  Although I have reviewed the note for such errors, some may still exist.    Adrien Brewster MD  16:56  07/30/20

## 2020-08-03 ENCOUNTER — CLINICAL SUPPORT (OUTPATIENT)
Dept: INTERNAL MEDICINE | Facility: CLINIC | Age: 70
End: 2020-08-03

## 2020-08-03 ENCOUNTER — READMISSION MANAGEMENT (OUTPATIENT)
Dept: CALL CENTER | Facility: HOSPITAL | Age: 70
End: 2020-08-03

## 2020-08-03 DIAGNOSIS — D51.0 PERNICIOUS ANEMIA: ICD-10-CM

## 2020-08-03 PROCEDURE — 96372 THER/PROPH/DIAG INJ SC/IM: CPT | Performed by: INTERNAL MEDICINE

## 2020-08-03 RX ADMIN — CYANOCOBALAMIN 1000 MCG: 1000 INJECTION, SOLUTION INTRAMUSCULAR; SUBCUTANEOUS at 11:22

## 2020-08-03 NOTE — OUTREACH NOTE
Medical Week 3 Survey      Responses   [facility] patient discharged from?  [place]   COVID-19 Test Status  Negative   Does the patient have one of the following disease processes/diagnoses(primary or secondary)?  Other   Week 3 attempt successful?  No   Unsuccessful attempts  Attempt 1          Steffanie Richardson RN

## 2020-08-04 RX ORDER — AMLODIPINE BESYLATE 10 MG/1
TABLET ORAL
Qty: 90 TABLET | Refills: 3 | Status: ON HOLD | OUTPATIENT
Start: 2020-08-04 | End: 2020-08-21

## 2020-08-06 ENCOUNTER — READMISSION MANAGEMENT (OUTPATIENT)
Dept: CALL CENTER | Facility: HOSPITAL | Age: 70
End: 2020-08-06

## 2020-08-06 NOTE — OUTREACH NOTE
Medical Week 3 Survey      Responses   [facility] patient discharged from?  [place]   COVID-19 Test Status  Negative   Does the patient have one of the following disease processes/diagnoses(primary or secondary)?  Other   Week 3 attempt successful?  No   Unsuccessful attempts  Attempt 2          Barbra Weeks RN

## 2020-08-07 ENCOUNTER — PROCEDURE VISIT (OUTPATIENT)
Dept: OTOLARYNGOLOGY | Age: 70
End: 2020-08-07
Payer: MEDICARE

## 2020-08-07 VITALS
BODY MASS INDEX: 22.35 KG/M2 | SYSTOLIC BLOOD PRESSURE: 132 MMHG | HEIGHT: 72 IN | WEIGHT: 165 LBS | DIASTOLIC BLOOD PRESSURE: 70 MMHG

## 2020-08-07 PROCEDURE — G8427 DOCREV CUR MEDS BY ELIG CLIN: HCPCS | Performed by: OTOLARYNGOLOGY

## 2020-08-07 PROCEDURE — 69433 CREATE EARDRUM OPENING: CPT | Performed by: OTOLARYNGOLOGY

## 2020-08-07 PROCEDURE — G8420 CALC BMI NORM PARAMETERS: HCPCS | Performed by: OTOLARYNGOLOGY

## 2020-08-07 NOTE — ASSESSMENT & PLAN NOTE
Continues used to complain of bothersome fullness in the left ear and requests myringotomy tube. In the past he has felt much more comfortable with the tube in place. Left ear tube placed today without difficulty.

## 2020-08-07 NOTE — PROGRESS NOTES
42-year-old male presents today for left ear tube placement. He continues to complain of fullness and pressure in the ear. He has had tubes in the past and feels much more comfortable with the tube in place. The left myringotomy tube was placed without difficulty under topical anesthetic.   He left the clinic in good condition having undergone an uncomplicated procedure or will return in a couple of weeks for follow-up evaluation and hearing testing

## 2020-08-11 ENCOUNTER — OFFICE VISIT (OUTPATIENT)
Dept: INTERNAL MEDICINE | Facility: CLINIC | Age: 70
End: 2020-08-11

## 2020-08-11 VITALS
HEIGHT: 72 IN | TEMPERATURE: 98.9 F | DIASTOLIC BLOOD PRESSURE: 64 MMHG | HEART RATE: 78 BPM | SYSTOLIC BLOOD PRESSURE: 132 MMHG | BODY MASS INDEX: 21.1 KG/M2 | OXYGEN SATURATION: 99 % | WEIGHT: 155.8 LBS

## 2020-08-11 DIAGNOSIS — N17.0 ATN (ACUTE TUBULAR NECROSIS) (HCC): ICD-10-CM

## 2020-08-11 DIAGNOSIS — K50.10 CROHN'S DISEASE OF LARGE INTESTINE WITHOUT COMPLICATION (HCC): ICD-10-CM

## 2020-08-11 DIAGNOSIS — I70.213 ATHEROSCLEROSIS OF NATIVE ARTERY OF BOTH LOWER EXTREMITIES WITH INTERMITTENT CLAUDICATION (HCC): ICD-10-CM

## 2020-08-11 DIAGNOSIS — I47.29 VENTRICULAR TACHYCARDIA, NONSUSTAINED (HCC): ICD-10-CM

## 2020-08-11 DIAGNOSIS — I10 ACCELERATED HYPERTENSION: Primary | ICD-10-CM

## 2020-08-11 PROBLEM — M89.50: Status: ACTIVE | Noted: 2020-08-11

## 2020-08-11 PROBLEM — R80.9 PROTEINURIA: Status: ACTIVE | Noted: 2020-08-11

## 2020-08-11 PROBLEM — R73.01 IMPAIRED FASTING BLOOD SUGAR: Status: ACTIVE | Noted: 2020-08-11

## 2020-08-11 PROBLEM — N52.9 ED (ERECTILE DYSFUNCTION) OF ORGANIC ORIGIN: Status: ACTIVE | Noted: 2020-08-11

## 2020-08-11 PROBLEM — I73.9 PAD (PERIPHERAL ARTERY DISEASE): Status: ACTIVE | Noted: 2020-08-11

## 2020-08-11 PROBLEM — J44.1 COPD EXACERBATION: Status: ACTIVE | Noted: 2020-08-11

## 2020-08-11 PROBLEM — I25.10 3-VESSEL CAD: Status: ACTIVE | Noted: 2020-08-11

## 2020-08-11 PROBLEM — N40.0 PROSTATIC HYPERTROPHY: Status: ACTIVE | Noted: 2020-08-11

## 2020-08-11 PROBLEM — F10.21 PERSONAL HISTORY OF ALCOHOLISM: Status: ACTIVE | Noted: 2020-08-11

## 2020-08-11 PROBLEM — E78.5 HYPERLIPIDEMIA: Status: ACTIVE | Noted: 2020-08-11

## 2020-08-11 PROBLEM — M51.26 DISPLACEMENT OF LUMBAR INTERVERTEBRAL DISC WITHOUT MYELOPATHY: Status: ACTIVE | Noted: 2020-08-11

## 2020-08-11 PROCEDURE — 96372 THER/PROPH/DIAG INJ SC/IM: CPT | Performed by: INTERNAL MEDICINE

## 2020-08-11 PROCEDURE — 99214 OFFICE O/P EST MOD 30 MIN: CPT | Performed by: INTERNAL MEDICINE

## 2020-08-11 RX ADMIN — CYANOCOBALAMIN 1000 MCG: 1000 INJECTION, SOLUTION INTRAMUSCULAR; SUBCUTANEOUS at 10:18

## 2020-08-11 NOTE — PROGRESS NOTES
Transitional Care Follow Up Visit  Subjective     Ricardo PITER Hugo is a 70 y.o. male who presents for a transitional care management visit.    Within 48 business hours after discharge our office contacted him via telephone to coordinate his care and needs.      I reviewed and discussed the details of that call along with the discharge summary, hospital problems, inpatient lab results, inpatient diagnostic studies, and consultation reports with Ricardo.     Current outpatient and discharge medications have been reconciled for the patient.  Reviewed by: Joe Velasco MD      Date of TCM Phone Call 7/15/2020   [facility]   Date of Admission 7/11/2020   Date of Discharge 7/15/2020   Discharge Disposition Home or Self Care     Risk for Readmission (LACE) No data recorded    History of Present Illness   Course During Hospital Stay:  ***     {Common H&P Review Areas:59904}    Review of Systems    Objective   Physical Exam    Assessment/Plan   {Assess/PlanSmartLinks:89165}

## 2020-08-11 NOTE — PROGRESS NOTES
Subjective   Ricardo Hugo is a 70 y.o. male.   Chief Complaint   Patient presents with   • HOSPITAL FOLLOW UP     2 WEEK FOLLOW UP        History of Present Illness     The following portions of the patient's history were reviewed and updated as appropriate: allergies, current medications, past family history, past medical history, past social history, past surgical history and problem list.    Review of Systems    Objective   Past Medical History:   Diagnosis Date   • Acid reflux    • Allergic rhinitis    • Chronic mucoid otitis media    • Chronic rhinitis    • Chronic sinusitis    • Coronary artery disease     HEART BYPASS 2004   • Eustachian tube dysfunction    • Heart disease    • Hypertension    • Mixed hearing loss of left ear    • Perianal abscess    • Sensorineural hearing loss    • Tinnitus       Past Surgical History:   Procedure Laterality Date   • COLONOSCOPY N/A 7/2/2020    Procedure: COLONOSCOPY WITH ANESTHESIA;  Surgeon: Adrien Brewster MD;  Location: [facility] ENDOSCOPY;  Service: Gastroenterology;  Laterality: N/A;  pre op: diarrhea  post op: polyps  PCP: Joe Velasco MD   • CORONARY ARTERY BYPASS GRAFT     • INCISION AND DRAINAGE PERIRECTAL ABSCESS N/A 3/3/2017    Procedure: INCISION AND DRAINAGE OF JEET ANAL ABSCESS;  Surgeon: Lynette Smith MD;  Location: [facility] OR;  Service:    • MYRINGOTOMY W/ TUBES Left 04/17/2017    06/10/2016   • TONSILLECTOMY     • TOTAL HIP ARTHROPLASTY          Current Outpatient Medications:   •  albuterol (PROVENTIL HFA;VENTOLIN HFA) 108 (90 BASE) MCG/ACT inhaler, Inhale 2 puffs 4 (Four) Times a Day As Needed for Wheezing or Shortness of Air., Disp: , Rfl:   •  amLODIPine (NORVASC) 10 MG tablet, TAKE 1 TABLET DAILY, Disp: 90 tablet, Rfl: 3  •  aspirin (ASPIR) 81 MG EC tablet, Take 81 mg by mouth Daily., Disp: , Rfl:   •  aspirin-acetaminophen-caffeine (Excedrin Menstrual Complete) 250-250-65 MG per tablet, Take 1 tablet by mouth Every 6 (Six) Hours As Needed  for Headache., Disp: , Rfl:   •  azelastine (ASTELIN) 0.1 % nasal spray, 1 spray into the nostril(s) as directed by provider 2 (Two) Times a Day As Needed for Rhinitis., Disp: , Rfl:   •  cholestyramine (QUESTRAN) 4 g packet, Take 1 packet by mouth Daily As Needed (Diarrhea)., Disp: 60 each, Rfl: 0  •  CloNIDine (CATAPRES) 0.2 MG tablet, Take 0.2 mg by mouth 3 (Three) Times a Day., Disp: , Rfl:   •  desoximetasone (TOPICORT) 0.25 % cream, APPLY EXTERNALLY TO THE AFFECTED AREA TWICE DAILY AS DIRECTED, Disp: 60 g, Rfl: 0  •  diphenoxylate-atropine (LOMOTIL) 2.5-0.025 MG per tablet, Take 1 tablet by mouth 4 (Four) Times a Day As Needed for Diarrhea., Disp: , Rfl:   •  fexofenadine (ALLEGRA) 180 MG tablet, Take 180 mg by mouth Daily., Disp: , Rfl:   •  fluticasone (FLONASE) 50 MCG/ACT nasal spray, 2 sprays into the nostril(s) as directed by provider As Needed for Rhinitis., Disp: , Rfl:   •  magnesium oxide (MAGOX) 400 (241.3 Mg) MG tablet tablet, Take 1 tablet by mouth Daily., Disp: 30 each, Rfl: 2  •  Melatonin 10 MG capsule, Take 2 capsules by mouth Daily., Disp: , Rfl:   •  mesalamine (APRISO) 0.375 g 24 hr capsule, Take 1 capsule by mouth Daily., Disp: 120 capsule, Rfl: 11  •  metoprolol succinate XL (TOPROL-XL) 25 MG 24 hr tablet, Take 1 tablet by mouth Daily., Disp: 30 tablet, Rfl: 5  •  Multiple Vitamins-Minerals (PRESERVISION/LUTEIN) capsule, Take 1 capsule by mouth 2 (two) times a day., Disp: , Rfl:   •  omeprazole (priLOSEC) 20 MG capsule, Take 20 mg by mouth Daily., Disp: , Rfl:   •  allopurinol (ZYLOPRIM) 100 MG tablet, Take 1 tablet by mouth Daily., Disp: 30 tablet, Rfl: 2  •  cholecalciferol (VITAMIN D3) 25 MCG (1000 UT) tablet, Take 2 tablets by mouth Daily., Disp: 60 tablet, Rfl: 2  •  famotidine (PEPCID) 20 MG tablet, Take 1 tablet by mouth Daily., Disp: 30 tablet, Rfl: 2  •  furosemide (LASIX) 20 MG tablet, TAKE 1 TABLET BY MOUTH DAILY, Disp: 90 tablet, Rfl: 3    Current Facility-Administered  Medications:   •  cyanocobalamin injection 1,000 mcg, 1,000 mcg, Intramuscular, Q28 Days, Joe Velasco MD, 1,000 mcg at 08/11/20 1018     Vitals:    08/11/20 1007   BP: 132/64   Pulse: 78   Temp: 98.9 °F (37.2 °C)   SpO2: 99%         08/11/20  1007   Weight: 70.7 kg (155 lb 12.8 oz)     Patient's Body mass index is 21.13 kg/m². BMI is {BMI range:50834}.      Physical Exam          Assessment/Plan   Diagnoses and all orders for this visit:    1. Accelerated hypertension (Primary)    2. Atherosclerosis of native artery of both lower extremities with intermittent claudication (CMS/HCC)    3. Ventricular tachycardia, nonsustained (CMS/HCC)    4. Crohn's disease of large intestine without complication (CMS/HCC)

## 2020-08-11 NOTE — PROGRESS NOTES
Transitional Care Follow Up Visit  Subjective     Ricardo Hugo is a 70 y.o. male who presents for a transitional care management visit.    Within 48 business hours after discharge our office contacted him via telephone to coordinate his care and needs.      I reviewed and discussed the details of that call along with the discharge summary, hospital problems, inpatient lab results, inpatient diagnostic studies, and consultation reports with Ricardo.     Current outpatient and discharge medications have been reconciled for the patient.  Reviewed by: Joe Velasco MD      Date of TCM Phone Call 7/15/2020   [facility]   Date of Admission 7/11/2020   Date of Discharge 7/15/2020   Discharge Disposition Home or Self Care     Risk for Readmission (LACE) No data recorded    Patient was recently hospitalized due to prolonged episodes of diarrhea which were subsequently found to be secondary to Crohn's disease he also went into acute renal failure appeared to be septic likely from E. coli from his colonic ulcerations.  Generally he is continued to have diarrhea we are scheduled to have his kidney function rechecked today he is asked about getting a second opinion however I have encouraged him to continue with current physicians as he they have found a problem not just a matter of getting him treated     Course During Hospital Stay: During patient's hospitalization he developed acute tubular necrosis.  Colonoscopy by Dr. White and showed areas of inflammation biopsy consistent with Crohn's disease.     The following portions of the patient's history were reviewed and updated as appropriate: allergies, current medications, past family history, past medical history, past social history, past surgical history and problem list.    Review of Systems   Constitutional: Negative for activity change, appetite change and chills.   HENT: Negative for congestion, ear pain and facial swelling.    Eyes: Negative for pain,  discharge and itching.   Respiratory: Negative for apnea, cough and shortness of breath.    Cardiovascular: Negative for chest pain, palpitations and leg swelling.   Gastrointestinal: Positive for diarrhea. Negative for abdominal distention, abdominal pain and anal bleeding.   Endocrine: Negative for cold intolerance and heat intolerance.   Genitourinary: Negative for difficulty urinating, dysuria and flank pain.   Musculoskeletal: Positive for arthralgias ( Hip pain). Negative for back pain and joint swelling.   Skin: Negative for color change, pallor and rash.   Allergic/Immunologic: Negative for environmental allergies and food allergies.   Neurological: Negative for dizziness and facial asymmetry.   Hematological: Negative for adenopathy. Does not bruise/bleed easily.   Psychiatric/Behavioral: Negative for agitation, behavioral problems and confusion.       Objective   Physical Exam   Constitutional: He is oriented to person, place, and time. He appears well-developed and well-nourished.   HENT:   Head: Normocephalic and atraumatic.   Mouth/Throat: Oropharynx is clear and moist.   Eyes: Pupils are equal, round, and reactive to light. EOM are normal.   Neck: Normal range of motion. Neck supple.   Cardiovascular: Normal rate and regular rhythm.   Pulmonary/Chest: Effort normal and breath sounds normal.   Abdominal: Soft. Bowel sounds are normal.   Musculoskeletal: Normal range of motion.   Neurological: He is alert and oriented to person, place, and time.   Skin: Skin is warm and dry.   Psychiatric: He has a normal mood and affect. His behavior is normal.   Nursing note and vitals reviewed.      Assessment/Plan   Problems Addressed this Visit        Cardiovascular and Mediastinum    Atherosclerosis of native artery of both lower extremities with intermittent claudication (CMS/HCC)    Accelerated hypertension - Primary    Relevant Orders    Comprehensive Metabolic Panel    Ventricular tachycardia, nonsustained  (CMS/HCC)       Digestive    Crohn's disease of large intestine without complication (CMS/HCC)    Relevant Orders    CBC & Differential      Other Visit Diagnoses     ATN (acute tubular necrosis) (CMS/HCC)              Patient at this point needs to continue to follow with his gastroenterologist in regard to his Crohn's disease I am checking a BMP regarding his ATN.  I have encouraged him to stay hydrated take his medications as prescribed

## 2020-08-12 LAB
ALBUMIN SERPL-MCNC: 3.7 G/DL (ref 3.5–5.2)
ALBUMIN/GLOB SERPL: 1 G/DL
ALP SERPL-CCNC: 207 U/L (ref 39–117)
ALT SERPL-CCNC: 26 U/L (ref 1–41)
AST SERPL-CCNC: 23 U/L (ref 1–40)
BASOPHILS # BLD AUTO: 0.08 10*3/MM3 (ref 0–0.2)
BASOPHILS NFR BLD AUTO: 1 % (ref 0–1.5)
BILIRUB SERPL-MCNC: 0.3 MG/DL (ref 0–1.2)
BUN SERPL-MCNC: 40 MG/DL (ref 8–23)
BUN/CREAT SERPL: 17.4 (ref 7–25)
CALCIUM SERPL-MCNC: 9.4 MG/DL (ref 8.6–10.5)
CHLORIDE SERPL-SCNC: 103 MMOL/L (ref 98–107)
CO2 SERPL-SCNC: 15.6 MMOL/L (ref 22–29)
CREAT SERPL-MCNC: 2.3 MG/DL (ref 0.76–1.27)
EOSINOPHIL # BLD AUTO: 0.21 10*3/MM3 (ref 0–0.4)
EOSINOPHIL NFR BLD AUTO: 2.7 % (ref 0.3–6.2)
ERYTHROCYTE [DISTWIDTH] IN BLOOD BY AUTOMATED COUNT: 13.2 % (ref 12.3–15.4)
GLOBULIN SER CALC-MCNC: 3.7 GM/DL
GLUCOSE SERPL-MCNC: 100 MG/DL (ref 65–99)
HCT VFR BLD AUTO: 29.5 % (ref 37.5–51)
HGB BLD-MCNC: 9.7 G/DL (ref 13–17.7)
IMM GRANULOCYTES # BLD AUTO: 0.06 10*3/MM3 (ref 0–0.05)
IMM GRANULOCYTES NFR BLD AUTO: 0.8 % (ref 0–0.5)
LYMPHOCYTES # BLD AUTO: 1.74 10*3/MM3 (ref 0.7–3.1)
LYMPHOCYTES NFR BLD AUTO: 22.5 % (ref 19.6–45.3)
MCH RBC QN AUTO: 32.6 PG (ref 26.6–33)
MCHC RBC AUTO-ENTMCNC: 32.9 G/DL (ref 31.5–35.7)
MCV RBC AUTO: 99 FL (ref 79–97)
MONOCYTES # BLD AUTO: 1 10*3/MM3 (ref 0.1–0.9)
MONOCYTES NFR BLD AUTO: 13 % (ref 5–12)
NEUTROPHILS # BLD AUTO: 4.63 10*3/MM3 (ref 1.7–7)
NEUTROPHILS NFR BLD AUTO: 60 % (ref 42.7–76)
NRBC BLD AUTO-RTO: 0.1 /100 WBC (ref 0–0.2)
PLATELET # BLD AUTO: 243 10*3/MM3 (ref 140–450)
POTASSIUM SERPL-SCNC: 4 MMOL/L (ref 3.5–5.2)
PROT SERPL-MCNC: 7.4 G/DL (ref 6–8.5)
RBC # BLD AUTO: 2.98 10*6/MM3 (ref 4.14–5.8)
SODIUM SERPL-SCNC: 134 MMOL/L (ref 136–145)
WBC # BLD AUTO: 7.72 10*3/MM3 (ref 3.4–10.8)

## 2020-08-17 ENCOUNTER — CLINICAL SUPPORT (OUTPATIENT)
Dept: INTERNAL MEDICINE | Facility: CLINIC | Age: 70
End: 2020-08-17

## 2020-08-17 DIAGNOSIS — D51.0 PERNICIOUS ANEMIA: ICD-10-CM

## 2020-08-17 PROCEDURE — 96372 THER/PROPH/DIAG INJ SC/IM: CPT | Performed by: INTERNAL MEDICINE

## 2020-08-17 RX ADMIN — CYANOCOBALAMIN 1000 MCG: 1000 INJECTION, SOLUTION INTRAMUSCULAR; SUBCUTANEOUS at 13:42

## 2020-08-19 ENCOUNTER — APPOINTMENT (OUTPATIENT)
Dept: GENERAL RADIOLOGY | Facility: HOSPITAL | Age: 70
End: 2020-08-19

## 2020-08-19 ENCOUNTER — HOSPITAL ENCOUNTER (INPATIENT)
Facility: HOSPITAL | Age: 70
LOS: 3 days | Discharge: HOME | End: 2020-08-22
Attending: EMERGENCY MEDICINE | Admitting: FAMILY MEDICINE

## 2020-08-19 DIAGNOSIS — A49.9 BACTERIAL URINARY INFECTION: Primary | ICD-10-CM

## 2020-08-19 DIAGNOSIS — N17.9 ACUTE RENAL FAILURE, UNSPECIFIED ACUTE RENAL FAILURE TYPE (HCC): ICD-10-CM

## 2020-08-19 DIAGNOSIS — N39.0 BACTERIAL URINARY INFECTION: Primary | ICD-10-CM

## 2020-08-19 DIAGNOSIS — Z74.09 IMPAIRED MOBILITY: ICD-10-CM

## 2020-08-19 DIAGNOSIS — N17.9 AKI (ACUTE KIDNEY INJURY) (HCC): ICD-10-CM

## 2020-08-19 DIAGNOSIS — Z78.9 DECREASED ACTIVITIES OF DAILY LIVING (ADL): ICD-10-CM

## 2020-08-19 LAB
ALBUMIN SERPL-MCNC: 3.6 G/DL (ref 3.5–5.2)
ALBUMIN/GLOB SERPL: 0.8 G/DL
ALP SERPL-CCNC: 144 U/L (ref 39–117)
ALT SERPL W P-5'-P-CCNC: 16 U/L (ref 1–41)
ANION GAP SERPL CALCULATED.3IONS-SCNC: 16 MMOL/L (ref 5–15)
AST SERPL-CCNC: 15 U/L (ref 1–40)
BACTERIA UR QL AUTO: ABNORMAL /HPF
BASOPHILS # BLD AUTO: 0.07 10*3/MM3 (ref 0–0.2)
BASOPHILS NFR BLD AUTO: 0.3 % (ref 0–1.5)
BILIRUB SERPL-MCNC: 0.3 MG/DL (ref 0–1.2)
BILIRUB UR QL STRIP: NEGATIVE
BUN SERPL-MCNC: 50 MG/DL (ref 8–23)
BUN/CREAT SERPL: 15.3 (ref 7–25)
CALCIUM SPEC-SCNC: 8.9 MG/DL (ref 8.6–10.5)
CHLORIDE SERPL-SCNC: 97 MMOL/L (ref 98–107)
CLARITY UR: ABNORMAL
CO2 SERPL-SCNC: 14 MMOL/L (ref 22–29)
COLOR UR: YELLOW
CREAT SERPL-MCNC: 3.26 MG/DL (ref 0.76–1.27)
CRP SERPL-MCNC: 18.13 MG/DL (ref 0–0.5)
D-LACTATE SERPL-SCNC: 1.2 MMOL/L (ref 0.5–2)
DEPRECATED RDW RBC AUTO: 47.8 FL (ref 37–54)
EOSINOPHIL # BLD AUTO: 0.03 10*3/MM3 (ref 0–0.4)
EOSINOPHIL NFR BLD AUTO: 0.1 % (ref 0.3–6.2)
ERYTHROCYTE [DISTWIDTH] IN BLOOD BY AUTOMATED COUNT: 14.1 % (ref 12.3–15.4)
GFR SERPL CREATININE-BSD FRML MDRD: 19 ML/MIN/1.73
GLOBULIN UR ELPH-MCNC: 4.4 GM/DL
GLUCOSE SERPL-MCNC: 115 MG/DL (ref 65–99)
GLUCOSE UR STRIP-MCNC: NEGATIVE MG/DL
HCT VFR BLD AUTO: 27.7 % (ref 37.5–51)
HGB BLD-MCNC: 9.6 G/DL (ref 13–17.7)
HGB UR QL STRIP.AUTO: ABNORMAL
HOLD SPECIMEN: NORMAL
HOLD SPECIMEN: NORMAL
HYALINE CASTS UR QL AUTO: ABNORMAL
IMM GRANULOCYTES # BLD AUTO: 0.23 10*3/MM3 (ref 0–0.05)
IMM GRANULOCYTES NFR BLD AUTO: 1 % (ref 0–0.5)
INR PPP: 1.51 (ref 0.91–1.09)
KETONES UR QL STRIP: NEGATIVE
LEUKOCYTE ESTERASE UR QL STRIP.AUTO: ABNORMAL
LYMPHOCYTES # BLD AUTO: 2.53 10*3/MM3 (ref 0.7–3.1)
LYMPHOCYTES NFR BLD AUTO: 11.4 % (ref 19.6–45.3)
MCH RBC QN AUTO: 31.9 PG (ref 26.6–33)
MCHC RBC AUTO-ENTMCNC: 34.7 G/DL (ref 31.5–35.7)
MCV RBC AUTO: 92 FL (ref 79–97)
MONOCYTES # BLD AUTO: 2.52 10*3/MM3 (ref 0.1–0.9)
MONOCYTES NFR BLD AUTO: 11.4 % (ref 5–12)
NEUTROPHILS NFR BLD AUTO: 16.75 10*3/MM3 (ref 1.7–7)
NEUTROPHILS NFR BLD AUTO: 75.8 % (ref 42.7–76)
NITRITE UR QL STRIP: POSITIVE
NRBC BLD AUTO-RTO: 0 /100 WBC (ref 0–0.2)
NT-PROBNP SERPL-MCNC: 8244 PG/ML (ref 0–900)
PH UR STRIP.AUTO: 5.5 [PH] (ref 5–8)
PLATELET # BLD AUTO: 215 10*3/MM3 (ref 140–450)
PMV BLD AUTO: 9.5 FL (ref 6–12)
POTASSIUM SERPL-SCNC: 3.2 MMOL/L (ref 3.5–5.2)
PROCALCITONIN SERPL-MCNC: 3.27 NG/ML (ref 0–0.25)
PROT SERPL-MCNC: 8 G/DL (ref 6–8.5)
PROT UR QL STRIP: ABNORMAL
PROTHROMBIN TIME: 17.9 SECONDS (ref 11.9–14.6)
RBC # BLD AUTO: 3.01 10*6/MM3 (ref 4.14–5.8)
RBC # UR: ABNORMAL /HPF
REF LAB TEST METHOD: ABNORMAL
SARS-COV-2 RDRP RESP QL NAA+PROBE: NOT DETECTED
SODIUM SERPL-SCNC: 127 MMOL/L (ref 136–145)
SP GR UR STRIP: 1.01 (ref 1–1.03)
SQUAMOUS #/AREA URNS HPF: ABNORMAL /HPF
TROPONIN T SERPL-MCNC: 0.05 NG/ML (ref 0–0.03)
UROBILINOGEN UR QL STRIP: ABNORMAL
WBC # BLD AUTO: 22.13 10*3/MM3 (ref 3.4–10.8)
WBC UR QL AUTO: ABNORMAL /HPF
WHOLE BLOOD HOLD SPECIMEN: NORMAL
WHOLE BLOOD HOLD SPECIMEN: NORMAL

## 2020-08-19 PROCEDURE — 87040 BLOOD CULTURE FOR BACTERIA: CPT | Performed by: EMERGENCY MEDICINE

## 2020-08-19 PROCEDURE — 71045 X-RAY EXAM CHEST 1 VIEW: CPT

## 2020-08-19 PROCEDURE — 87086 URINE CULTURE/COLONY COUNT: CPT | Performed by: EMERGENCY MEDICINE

## 2020-08-19 PROCEDURE — 93005 ELECTROCARDIOGRAM TRACING: CPT | Performed by: EMERGENCY MEDICINE

## 2020-08-19 PROCEDURE — 25010000002 CEFTRIAXONE PER 250 MG: Performed by: EMERGENCY MEDICINE

## 2020-08-19 PROCEDURE — 99285 EMERGENCY DEPT VISIT HI MDM: CPT

## 2020-08-19 PROCEDURE — 83880 ASSAY OF NATRIURETIC PEPTIDE: CPT | Performed by: EMERGENCY MEDICINE

## 2020-08-19 PROCEDURE — 85610 PROTHROMBIN TIME: CPT | Performed by: EMERGENCY MEDICINE

## 2020-08-19 PROCEDURE — 86140 C-REACTIVE PROTEIN: CPT | Performed by: EMERGENCY MEDICINE

## 2020-08-19 PROCEDURE — 83605 ASSAY OF LACTIC ACID: CPT | Performed by: EMERGENCY MEDICINE

## 2020-08-19 PROCEDURE — 87077 CULTURE AEROBIC IDENTIFY: CPT | Performed by: EMERGENCY MEDICINE

## 2020-08-19 PROCEDURE — 87635 SARS-COV-2 COVID-19 AMP PRB: CPT | Performed by: EMERGENCY MEDICINE

## 2020-08-19 PROCEDURE — 93010 ELECTROCARDIOGRAM REPORT: CPT | Performed by: INTERNAL MEDICINE

## 2020-08-19 PROCEDURE — 87186 SC STD MICRODIL/AGAR DIL: CPT | Performed by: EMERGENCY MEDICINE

## 2020-08-19 PROCEDURE — 85025 COMPLETE CBC W/AUTO DIFF WBC: CPT | Performed by: EMERGENCY MEDICINE

## 2020-08-19 PROCEDURE — 84145 PROCALCITONIN (PCT): CPT | Performed by: EMERGENCY MEDICINE

## 2020-08-19 PROCEDURE — 81001 URINALYSIS AUTO W/SCOPE: CPT | Performed by: EMERGENCY MEDICINE

## 2020-08-19 PROCEDURE — 84484 ASSAY OF TROPONIN QUANT: CPT | Performed by: EMERGENCY MEDICINE

## 2020-08-19 PROCEDURE — 80053 COMPREHEN METABOLIC PANEL: CPT | Performed by: EMERGENCY MEDICINE

## 2020-08-19 RX ORDER — FAMOTIDINE 20 MG/1
10 TABLET, FILM COATED ORAL DAILY
Status: DISCONTINUED | OUTPATIENT
Start: 2020-08-20 | End: 2020-08-21

## 2020-08-19 RX ORDER — ALBUTEROL SULFATE 90 UG/1
2 AEROSOL, METERED RESPIRATORY (INHALATION) 4 TIMES DAILY PRN
Status: DISCONTINUED | OUTPATIENT
Start: 2020-08-19 | End: 2020-08-19 | Stop reason: CLARIF

## 2020-08-19 RX ORDER — MELATONIN
2000 DAILY
Status: DISCONTINUED | OUTPATIENT
Start: 2020-08-20 | End: 2020-08-22 | Stop reason: HOSPADM

## 2020-08-19 RX ORDER — SODIUM CHLORIDE 0.9 % (FLUSH) 0.9 %
10 SYRINGE (ML) INJECTION EVERY 12 HOURS SCHEDULED
Status: DISCONTINUED | OUTPATIENT
Start: 2020-08-19 | End: 2020-08-22 | Stop reason: HOSPADM

## 2020-08-19 RX ORDER — MESALAMINE 0.38 G/1
0.38 CAPSULE, EXTENDED RELEASE ORAL DAILY
Status: DISCONTINUED | OUTPATIENT
Start: 2020-08-20 | End: 2020-08-20

## 2020-08-19 RX ORDER — ALLOPURINOL 100 MG/1
100 TABLET ORAL DAILY
Status: DISCONTINUED | OUTPATIENT
Start: 2020-08-20 | End: 2020-08-21

## 2020-08-19 RX ORDER — ASPIRIN 81 MG/1
81 TABLET ORAL DAILY
Status: DISCONTINUED | OUTPATIENT
Start: 2020-08-20 | End: 2020-08-22 | Stop reason: HOSPADM

## 2020-08-19 RX ORDER — POTASSIUM CHLORIDE 750 MG/1
10 CAPSULE, EXTENDED RELEASE ORAL DAILY
Status: DISCONTINUED | OUTPATIENT
Start: 2020-08-20 | End: 2020-08-20

## 2020-08-19 RX ORDER — SODIUM CHLORIDE 9 MG/ML
100 INJECTION, SOLUTION INTRAVENOUS CONTINUOUS
Status: DISCONTINUED | OUTPATIENT
Start: 2020-08-19 | End: 2020-08-20

## 2020-08-19 RX ORDER — ALBUTEROL SULFATE 2.5 MG/3ML
2.5 SOLUTION RESPIRATORY (INHALATION) 4 TIMES DAILY PRN
Status: DISCONTINUED | OUTPATIENT
Start: 2020-08-19 | End: 2020-08-22 | Stop reason: HOSPADM

## 2020-08-19 RX ORDER — AMLODIPINE BESYLATE 10 MG/1
10 TABLET ORAL DAILY
Status: DISCONTINUED | OUTPATIENT
Start: 2020-08-20 | End: 2020-08-22 | Stop reason: HOSPADM

## 2020-08-19 RX ORDER — ACETAMINOPHEN, ASPIRIN AND CAFFEINE 250; 250; 65 MG/1; MG/1; MG/1
1 TABLET, FILM COATED ORAL EVERY 6 HOURS PRN
Status: DISCONTINUED | OUTPATIENT
Start: 2020-08-19 | End: 2020-08-22 | Stop reason: HOSPADM

## 2020-08-19 RX ORDER — DIPHENOXYLATE HYDROCHLORIDE AND ATROPINE SULFATE 2.5; .025 MG/1; MG/1
1 TABLET ORAL 4 TIMES DAILY PRN
Status: DISCONTINUED | OUTPATIENT
Start: 2020-08-19 | End: 2020-08-22 | Stop reason: HOSPADM

## 2020-08-19 RX ORDER — VITS A,C,E/LUTEIN/MINERALS 300MCG-200
1 TABLET ORAL DAILY
Status: DISCONTINUED | OUTPATIENT
Start: 2020-08-20 | End: 2020-08-22 | Stop reason: HOSPADM

## 2020-08-19 RX ORDER — AZELASTINE 1 MG/ML
1 SPRAY, METERED NASAL 2 TIMES DAILY PRN
Status: DISCONTINUED | OUTPATIENT
Start: 2020-08-19 | End: 2020-08-22 | Stop reason: HOSPADM

## 2020-08-19 RX ORDER — ACETAMINOPHEN 325 MG/1
650 TABLET ORAL EVERY 4 HOURS PRN
Status: DISCONTINUED | OUTPATIENT
Start: 2020-08-19 | End: 2020-08-22 | Stop reason: HOSPADM

## 2020-08-19 RX ORDER — SODIUM CHLORIDE 0.9 % (FLUSH) 0.9 %
10 SYRINGE (ML) INJECTION AS NEEDED
Status: DISCONTINUED | OUTPATIENT
Start: 2020-08-19 | End: 2020-08-22 | Stop reason: HOSPADM

## 2020-08-19 RX ORDER — CLONIDINE HYDROCHLORIDE 0.2 MG/1
0.2 TABLET ORAL 3 TIMES DAILY
Status: DISCONTINUED | OUTPATIENT
Start: 2020-08-19 | End: 2020-08-22 | Stop reason: HOSPADM

## 2020-08-19 RX ORDER — ONDANSETRON 2 MG/ML
4 INJECTION INTRAMUSCULAR; INTRAVENOUS EVERY 6 HOURS PRN
Status: DISCONTINUED | OUTPATIENT
Start: 2020-08-19 | End: 2020-08-22 | Stop reason: HOSPADM

## 2020-08-19 RX ORDER — CHOLESTYRAMINE 4 G/9G
1 POWDER, FOR SUSPENSION ORAL DAILY PRN
Status: DISCONTINUED | OUTPATIENT
Start: 2020-08-19 | End: 2020-08-20

## 2020-08-19 RX ORDER — PANTOPRAZOLE SODIUM 40 MG/1
40 TABLET, DELAYED RELEASE ORAL
Status: DISCONTINUED | OUTPATIENT
Start: 2020-08-20 | End: 2020-08-20

## 2020-08-19 RX ORDER — METOPROLOL SUCCINATE 25 MG/1
25 TABLET, EXTENDED RELEASE ORAL DAILY
Status: DISCONTINUED | OUTPATIENT
Start: 2020-08-20 | End: 2020-08-22 | Stop reason: HOSPADM

## 2020-08-19 RX ADMIN — CEFTRIAXONE SODIUM 1 G: 1 INJECTION, POWDER, FOR SOLUTION INTRAMUSCULAR; INTRAVENOUS at 20:32

## 2020-08-19 RX ADMIN — SODIUM CHLORIDE 100 ML/HR: 9 INJECTION, SOLUTION INTRAVENOUS at 21:33

## 2020-08-19 RX ADMIN — SODIUM CHLORIDE 500 ML: 0.9 INJECTION, SOLUTION INTRAVENOUS at 18:00

## 2020-08-19 RX ADMIN — SODIUM CHLORIDE, PRESERVATIVE FREE 10 ML: 5 INJECTION INTRAVENOUS at 21:33

## 2020-08-19 RX ADMIN — SODIUM CHLORIDE 500 ML: 9 INJECTION, SOLUTION INTRAVENOUS at 17:26

## 2020-08-19 RX ADMIN — CLONIDINE HYDROCHLORIDE 0.2 MG: 0.2 TABLET ORAL at 22:22

## 2020-08-20 PROBLEM — N30.90 CYSTITIS: Status: ACTIVE | Noted: 2020-08-19

## 2020-08-20 PROBLEM — E87.29 METABOLIC ACIDOSIS, INCREASED ANION GAP: Status: ACTIVE | Noted: 2020-07-11

## 2020-08-20 PROBLEM — E87.6 HYPOKALEMIA: Status: ACTIVE | Noted: 2020-08-20

## 2020-08-20 LAB
ALBUMIN SERPL-MCNC: 2.9 G/DL (ref 3.5–5.2)
ALBUMIN/GLOB SERPL: 0.7 G/DL
ALP SERPL-CCNC: 123 U/L (ref 39–117)
ALT SERPL W P-5'-P-CCNC: 13 U/L (ref 1–41)
ANION GAP SERPL CALCULATED.3IONS-SCNC: 16 MMOL/L (ref 5–15)
AST SERPL-CCNC: 13 U/L (ref 1–40)
BASOPHILS # BLD AUTO: 0.05 10*3/MM3 (ref 0–0.2)
BASOPHILS NFR BLD AUTO: 0.3 % (ref 0–1.5)
BILIRUB SERPL-MCNC: 0.2 MG/DL (ref 0–1.2)
BUN SERPL-MCNC: 43 MG/DL (ref 8–23)
BUN/CREAT SERPL: 14.8 (ref 7–25)
CALCIUM SPEC-SCNC: 8.2 MG/DL (ref 8.6–10.5)
CHLORIDE SERPL-SCNC: 100 MMOL/L (ref 98–107)
CO2 SERPL-SCNC: 12 MMOL/L (ref 22–29)
CREAT SERPL-MCNC: 2.9 MG/DL (ref 0.76–1.27)
DEPRECATED RDW RBC AUTO: 48.2 FL (ref 37–54)
EOSINOPHIL # BLD AUTO: 0.04 10*3/MM3 (ref 0–0.4)
EOSINOPHIL NFR BLD AUTO: 0.3 % (ref 0.3–6.2)
ERYTHROCYTE [DISTWIDTH] IN BLOOD BY AUTOMATED COUNT: 14.2 % (ref 12.3–15.4)
GFR SERPL CREATININE-BSD FRML MDRD: 21 ML/MIN/1.73
GLOBULIN UR ELPH-MCNC: 3.9 GM/DL
GLUCOSE SERPL-MCNC: 99 MG/DL (ref 65–99)
HCT VFR BLD AUTO: 24.4 % (ref 37.5–51)
HGB BLD-MCNC: 8.5 G/DL (ref 13–17.7)
IMM GRANULOCYTES # BLD AUTO: 0.11 10*3/MM3 (ref 0–0.05)
IMM GRANULOCYTES NFR BLD AUTO: 0.7 % (ref 0–0.5)
LYMPHOCYTES # BLD AUTO: 1.83 10*3/MM3 (ref 0.7–3.1)
LYMPHOCYTES NFR BLD AUTO: 12.1 % (ref 19.6–45.3)
MAGNESIUM SERPL-MCNC: 0.9 MG/DL (ref 1.6–2.4)
MCH RBC QN AUTO: 32.2 PG (ref 26.6–33)
MCHC RBC AUTO-ENTMCNC: 34.8 G/DL (ref 31.5–35.7)
MCV RBC AUTO: 92.4 FL (ref 79–97)
MONOCYTES # BLD AUTO: 1.63 10*3/MM3 (ref 0.1–0.9)
MONOCYTES NFR BLD AUTO: 10.7 % (ref 5–12)
NEUTROPHILS NFR BLD AUTO: 11.52 10*3/MM3 (ref 1.7–7)
NEUTROPHILS NFR BLD AUTO: 75.9 % (ref 42.7–76)
NRBC BLD AUTO-RTO: 0 /100 WBC (ref 0–0.2)
PHOSPHATE SERPL-MCNC: 3.4 MG/DL (ref 2.5–4.5)
PLATELET # BLD AUTO: 152 10*3/MM3 (ref 140–450)
PMV BLD AUTO: 9.9 FL (ref 6–12)
POTASSIUM SERPL-SCNC: 3.1 MMOL/L (ref 3.5–5.2)
PROT SERPL-MCNC: 6.8 G/DL (ref 6–8.5)
RBC # BLD AUTO: 2.64 10*6/MM3 (ref 4.14–5.8)
SODIUM SERPL-SCNC: 128 MMOL/L (ref 136–145)
WBC # BLD AUTO: 15.18 10*3/MM3 (ref 3.4–10.8)

## 2020-08-20 PROCEDURE — 83735 ASSAY OF MAGNESIUM: CPT | Performed by: INTERNAL MEDICINE

## 2020-08-20 PROCEDURE — 25010000002 CEFTRIAXONE PER 250 MG: Performed by: INTERNAL MEDICINE

## 2020-08-20 PROCEDURE — 97161 PT EVAL LOW COMPLEX 20 MIN: CPT | Performed by: PHYSICAL THERAPIST

## 2020-08-20 PROCEDURE — 84100 ASSAY OF PHOSPHORUS: CPT | Performed by: INTERNAL MEDICINE

## 2020-08-20 PROCEDURE — 97165 OT EVAL LOW COMPLEX 30 MIN: CPT | Performed by: OCCUPATIONAL THERAPIST

## 2020-08-20 PROCEDURE — 80053 COMPREHEN METABOLIC PANEL: CPT | Performed by: INTERNAL MEDICINE

## 2020-08-20 PROCEDURE — 99221 1ST HOSP IP/OBS SF/LOW 40: CPT | Performed by: INTERNAL MEDICINE

## 2020-08-20 PROCEDURE — 85025 COMPLETE CBC W/AUTO DIFF WBC: CPT | Performed by: INTERNAL MEDICINE

## 2020-08-20 PROCEDURE — 25010000002 MAGNESIUM SULFATE 2 GM/50ML SOLUTION: Performed by: INTERNAL MEDICINE

## 2020-08-20 RX ORDER — SODIUM BICARBONATE 650 MG/1
650 TABLET ORAL 3 TIMES DAILY
Status: DISCONTINUED | OUTPATIENT
Start: 2020-08-20 | End: 2020-08-22 | Stop reason: HOSPADM

## 2020-08-20 RX ORDER — SACCHAROMYCES BOULARDII 250 MG
250 CAPSULE ORAL 2 TIMES DAILY
Status: DISCONTINUED | OUTPATIENT
Start: 2020-08-20 | End: 2020-08-22 | Stop reason: HOSPADM

## 2020-08-20 RX ORDER — POTASSIUM CHLORIDE 750 MG/1
40 CAPSULE, EXTENDED RELEASE ORAL ONCE
Status: COMPLETED | OUTPATIENT
Start: 2020-08-20 | End: 2020-08-20

## 2020-08-20 RX ORDER — CHOLESTYRAMINE 4 G/9G
1 POWDER, FOR SUSPENSION ORAL DAILY
Qty: 30 PACKET | Refills: 10 | Status: SHIPPED | OUTPATIENT
Start: 2020-08-21 | End: 2020-09-22 | Stop reason: HOSPADM

## 2020-08-20 RX ORDER — CHOLESTYRAMINE 4 G/9G
1 POWDER, FOR SUSPENSION ORAL DAILY
Status: DISCONTINUED | OUTPATIENT
Start: 2020-08-20 | End: 2020-08-22 | Stop reason: HOSPADM

## 2020-08-20 RX ORDER — MAGNESIUM SULFATE HEPTAHYDRATE 40 MG/ML
2 INJECTION, SOLUTION INTRAVENOUS ONCE
Status: COMPLETED | OUTPATIENT
Start: 2020-08-20 | End: 2020-08-20

## 2020-08-20 RX ORDER — MESALAMINE 0.38 G/1
1.5 CAPSULE, EXTENDED RELEASE ORAL DAILY
Status: DISCONTINUED | OUTPATIENT
Start: 2020-08-21 | End: 2020-08-22 | Stop reason: HOSPADM

## 2020-08-20 RX ADMIN — ALLOPURINOL 100 MG: 100 TABLET ORAL at 09:27

## 2020-08-20 RX ADMIN — SODIUM BICARBONATE 650 MG: 650 TABLET ORAL at 16:06

## 2020-08-20 RX ADMIN — ASPIRIN 81 MG: 81 TABLET ORAL at 09:28

## 2020-08-20 RX ADMIN — CLONIDINE HYDROCHLORIDE 0.2 MG: 0.2 TABLET ORAL at 09:27

## 2020-08-20 RX ADMIN — PANTOPRAZOLE SODIUM 40 MG: 40 TABLET, DELAYED RELEASE ORAL at 05:25

## 2020-08-20 RX ADMIN — MESALAMINE 0.38 G: 375 CAPSULE, EXTENDED RELEASE ORAL at 12:08

## 2020-08-20 RX ADMIN — SODIUM BICARBONATE 650 MG: 650 TABLET ORAL at 12:09

## 2020-08-20 RX ADMIN — METOPROLOL SUCCINATE 25 MG: 25 TABLET, EXTENDED RELEASE ORAL at 09:28

## 2020-08-20 RX ADMIN — CHOLESTYRAMINE 1 PACKET: 4 POWDER, FOR SUSPENSION ORAL at 12:09

## 2020-08-20 RX ADMIN — POTASSIUM CHLORIDE 40 MEQ: 750 CAPSULE, EXTENDED RELEASE ORAL at 09:36

## 2020-08-20 RX ADMIN — AMLODIPINE BESYLATE 10 MG: 10 TABLET ORAL at 09:28

## 2020-08-20 RX ADMIN — FAMOTIDINE 10 MG: 20 TABLET, FILM COATED ORAL at 09:28

## 2020-08-20 RX ADMIN — SODIUM BICARBONATE 650 MG: 650 TABLET ORAL at 21:06

## 2020-08-20 RX ADMIN — CEFTRIAXONE SODIUM 1 G: 1 INJECTION, POWDER, FOR SOLUTION INTRAMUSCULAR; INTRAVENOUS at 21:06

## 2020-08-20 RX ADMIN — Medication 400 MG: at 09:27

## 2020-08-20 RX ADMIN — CLONIDINE HYDROCHLORIDE 0.2 MG: 0.2 TABLET ORAL at 21:06

## 2020-08-20 RX ADMIN — MAGNESIUM SULFATE HEPTAHYDRATE 2 G: 40 INJECTION, SOLUTION INTRAVENOUS at 07:31

## 2020-08-20 RX ADMIN — VITAMIN D, TAB 1000IU (100/BT) 2000 UNITS: 25 TAB at 09:27

## 2020-08-20 RX ADMIN — CLONIDINE HYDROCHLORIDE 0.2 MG: 0.2 TABLET ORAL at 16:06

## 2020-08-20 RX ADMIN — SODIUM CHLORIDE 100 ML/HR: 9 INJECTION, SOLUTION INTRAVENOUS at 06:40

## 2020-08-20 RX ADMIN — Medication 400 MG: at 21:06

## 2020-08-20 RX ADMIN — Medication 250 MG: at 09:27

## 2020-08-20 RX ADMIN — Medication 100 MEQ: at 16:06

## 2020-08-20 RX ADMIN — Medication 250 MG: at 21:06

## 2020-08-20 RX ADMIN — Medication 1 TABLET: at 09:27

## 2020-08-20 NOTE — CONSULTS
[facility] Gastroenterology  Initial Inpatient Consult Note    Referring Provider: Shana Irizarry DO    Reason for Consultation: Diarrhea causing AMAYA, last colonoscopy consistent with Crohn's.    Subjective     History of present illness:          70-year-old male admitted with acute on chronic renal failure, sepsis/UTI, hyponatremia, hypokalemia, weakness, slightly elevated troponin (probably related renal disease), nonspecific anemia (probably related to renal disease), metabolic acidosis, and chronic diarrhea.    Mr. Dior started having problems with diarrhea February 2020.  He did have a colonoscopy July 2020 with biopsies with minimal inflammation noted.  He also had 6 polyps and diverticulosis.  [facility] first at diagnostic panel was consistent with Crohn's disease.  After the colonoscopy he was started on a Medrol Dosepak.  His symptoms did seem to improve some but has never completely resolved.  He was started on apriso July 30, 2020.  Dr. Brewster recommended taking apriso 4 tablets daily and he has only been taking 1 tablet daily.  He is also taking Questran once daily.  He is having 2-3 loose bowel movements daily.  The diarrhea has not gotten any worse recently.  His main complaint is of progressive weakness.  He states that his last solid bowel movement was February 2020.  Has lost about 15 pounds over the last 1 to 2 months.  States that he is not eating as much as he is concerned it would make the diarrhea worse.  Denies rectal bleeding.  Denies fevers or chills.     Last office visit with Dr. Cordova was July 30, 2020.  Next appointment with Dr. Brewster was scheduled for September 29, 2020.      Last stool studies were July 2020 and denies being on any antibiotics since then.  He is on Rocephin this admission for UTI.    Labs this morning sodium 128, potassium 3.1, creatinine 2.9, BUN 43.  CRP on admission 18.3, pro calcitonin 3.27, lactate 1.2.  INR 1.51.  White blood cell count 15.1,  hemoglobin 8.5, platelets 152.        Past Medical History:  Past Medical History:   Diagnosis Date   • Acid reflux    • Allergic rhinitis    • Chronic mucoid otitis media    • Chronic rhinitis    • Chronic sinusitis    • Coronary artery disease     HEART BYPASS 2004   • Eustachian tube dysfunction    • Heart disease    • Hypertension    • Mixed hearing loss of left ear    • Perianal abscess    • Sensorineural hearing loss    • Tinnitus        Past Surgical History:  Past Surgical History:   Procedure Laterality Date   • COLONOSCOPY N/A 7/2/2020    Procedure: COLONOSCOPY WITH ANESTHESIA;  Surgeon: Adrien Brewster MD;  Location: [facility] ENDOSCOPY;  Service: Gastroenterology;  Laterality: N/A;  pre op: diarrhea  post op: polyps  PCP: Joe Velasco MD   • CORONARY ARTERY BYPASS GRAFT     • INCISION AND DRAINAGE PERIRECTAL ABSCESS N/A 3/3/2017    Procedure: INCISION AND DRAINAGE OF JEET ANAL ABSCESS;  Surgeon: Lynette Smith MD;  Location: [facility] OR;  Service:    • MYRINGOTOMY W/ TUBES Left 04/17/2017    06/10/2016   • TONSILLECTOMY     • TOTAL HIP ARTHROPLASTY          Social History:   Social History     Tobacco Use   • Smoking status: Former Smoker   • Smokeless tobacco: Never Used   • Tobacco comment: quit 2010   Substance Use Topics   • Alcohol use: Yes     Alcohol/week: 14.0 standard drinks     Types: 14 Cans of beer per week     Frequency: Monthly or less     Comment: occ        Family History:  Family History   Problem Relation Age of Onset   • No Known Problems Mother    • No Known Problems Father    • Colon cancer Neg Hx    • Colon polyps Neg Hx        Home Meds:  Facility-Administered Medications Prior to Admission   Medication Dose Route Frequency Provider Last Rate Last Dose   • cyanocobalamin injection 1,000 mcg  1,000 mcg Intramuscular Q28 Days Joe Velasco MD   1,000 mcg at 08/17/20 1342     Medications Prior to Admission   Medication Sig Dispense Refill Last Dose   • albuterol (PROVENTIL  HFA;VENTOLIN HFA) 108 (90 BASE) MCG/ACT inhaler Inhale 2 puffs 4 (Four) Times a Day As Needed for Wheezing or Shortness of Air.   Taking   • allopurinol (ZYLOPRIM) 100 MG tablet Take 1 tablet by mouth Daily. 30 tablet 2 Not Taking   • amLODIPine (NORVASC) 10 MG tablet TAKE 1 TABLET DAILY 90 tablet 3 Taking   • aspirin (ASPIR) 81 MG EC tablet Take 81 mg by mouth Daily.   Taking   • aspirin-acetaminophen-caffeine (Excedrin Menstrual Complete) 250-250-65 MG per tablet Take 1 tablet by mouth Every 6 (Six) Hours As Needed for Headache.   Taking   • azelastine (ASTELIN) 0.1 % nasal spray 1 spray into the nostril(s) as directed by provider 2 (Two) Times a Day As Needed for Rhinitis.   Taking   • cholecalciferol (VITAMIN D3) 25 MCG (1000 UT) tablet Take 2 tablets by mouth Daily. 60 tablet 2 Not Taking   • cholestyramine (QUESTRAN) 4 g packet Take 1 packet by mouth Daily As Needed (Diarrhea). 60 each 0 Taking   • CloNIDine (CATAPRES) 0.2 MG tablet Take 0.2 mg by mouth 3 (Three) Times a Day.   Taking   • desoximetasone (TOPICORT) 0.25 % cream APPLY EXTERNALLY TO THE AFFECTED AREA TWICE DAILY AS DIRECTED 60 g 0 Taking   • diphenoxylate-atropine (LOMOTIL) 2.5-0.025 MG per tablet Take 1 tablet by mouth 4 (Four) Times a Day As Needed for Diarrhea.   Taking   • famotidine (PEPCID) 20 MG tablet Take 1 tablet by mouth Daily. 30 tablet 2 Not Taking   • fexofenadine (ALLEGRA) 180 MG tablet Take 180 mg by mouth Daily.   Taking   • fluticasone (FLONASE) 50 MCG/ACT nasal spray 2 sprays into the nostril(s) as directed by provider As Needed for Rhinitis.   Taking   • furosemide (LASIX) 20 MG tablet TAKE 1 TABLET BY MOUTH DAILY 90 tablet 3 Not Taking   • magnesium oxide (MAGOX) 400 (241.3 Mg) MG tablet tablet Take 1 tablet by mouth Daily. 30 each 2 Taking   • Melatonin 10 MG capsule Take 2 capsules by mouth Daily.   Taking   • mesalamine (APRISO) 0.375 g 24 hr capsule Take 1 capsule by mouth Daily. 120 capsule 11 Taking   • metoprolol  succinate XL (TOPROL-XL) 25 MG 24 hr tablet Take 1 tablet by mouth Daily. 30 tablet 5 Taking   • Multiple Vitamins-Minerals (PRESERVISION/LUTEIN) capsule Take 1 capsule by mouth 2 (two) times a day.   Taking   • omeprazole (priLOSEC) 20 MG capsule Take 20 mg by mouth Daily.   Taking       Current Meds:  Current Facility-Administered Medications   Medication Dose Route Frequency Provider Last Rate Last Dose   • acetaminophen (TYLENOL) tablet 650 mg  650 mg Oral Q4H PRN Shana Irizarry DO       • albuterol (PROVENTIL) nebulizer solution 0.083% 2.5 mg/3mL  2.5 mg Nebulization 4x Daily PRN Shana Irizarry DO       • allopurinol (ZYLOPRIM) tablet 100 mg  100 mg Oral Daily Shana Irizarry DO   100 mg at 08/20/20 0927   • amLODIPine (NORVASC) tablet 10 mg  10 mg Oral Daily Shana Irizarry DO   10 mg at 08/20/20 0928   • aspirin EC tablet 81 mg  81 mg Oral Daily Shana Irizarry DO   81 mg at 08/20/20 0928   • aspirin-acetaminophen-caffeine (EXCEDRIN MIGRAINE) per tablet 1 tablet  1 tablet Oral Q6H PRN Shana Irizarry DO       • azelastine (ASTELIN) nasal spray 1 spray  1 spray Each Nare BID PRN Shana Irizarry DO       • cefTRIAXone (ROCEPHIN) 1 g/100 mL 0.9% NS (MBP)  1 g Intravenous Q24H Shana Irizarry DO       • cholecalciferol (VITAMIN D3) tablet 2,000 Units  2,000 Units Oral Daily Shana Irizarry DO   2,000 Units at 08/20/20 0927   • cholestyramine (QUESTRAN) packet 1 packet  1 packet Oral Daily Maria L Zambrano APRN   1 packet at 08/20/20 1209   • cloNIDine (CATAPRES) tablet 0.2 mg  0.2 mg Oral TID Shana Irizarry DO   0.2 mg at 08/20/20 0927   • diphenoxylate-atropine (LOMOTIL) 2.5-0.025 MG per tablet 1 tablet  1 tablet Oral 4x Daily PRN Shana Irizarry DO       • famotidine (PEPCID) tablet 10 mg  10 mg Oral Daily Shana Irizarry DO   10 mg at 08/20/20 0928   • magnesium oxide (MAGOX) tablet 400 mg  400 mg Oral BID Maria L Zambrano APRN   400 mg at 08/20/20 0927     • [START ON 8/21/2020] mesalamine (APRISO) 24 hr capsule 1.5 g  1.5 g Oral Daily Zuleika Schulte APRN       • metoprolol succinate XL (TOPROL-XL) 24 hr tablet 25 mg  25 mg Oral Daily Shana Irizarry DO   25 mg at 08/20/20 0928   • Ocuvite-Lutein (OCUVITE) tablet 1 tablet  1 tablet Oral Daily Shana Irizarry DO   1 tablet at 08/20/20 0927   • ondansetron (ZOFRAN) injection 4 mg  4 mg Intravenous Q6H PRN Shana Irizarry DO       • saccharomyces boulardii (FLORASTOR) capsule 250 mg  250 mg Oral BID Woeltz, Maria L K, APRN   250 mg at 08/20/20 0927   • sodium bicarbonate 8.4 % 100 mEq in sodium chloride 0.45 % 1,000 mL infusion (greater than 75 mEq)  100 mEq Intravenous Continuous Bolivar Rueda MD       • sodium bicarbonate tablet 650 mg  650 mg Oral TID Vanessa Zambranoricia K, APRN   650 mg at 08/20/20 1209   • sodium chloride 0.9 % flush 10 mL  10 mL Intravenous Q12H Shana Irizarry DO   10 mL at 08/19/20 2133   • sodium chloride 0.9 % flush 10 mL  10 mL Intravenous PRN Shana Irizarry DO           Allergies:  Allergies   Allergen Reactions   • Lortab [Hydrocodone-Acetaminophen] Other (See Comments) and Hallucinations     CLOSTROPHOBIC       Review of Systems  Review of Systems   Constitutional: Positive for fatigue and unexpected weight change. Negative for chills and fever.   HENT: Negative for sore throat and trouble swallowing.    Eyes: Negative for visual disturbance.   Respiratory: Negative for cough, shortness of breath and wheezing.    Cardiovascular: Negative for chest pain and palpitations.   Gastrointestinal: Positive for diarrhea. Negative for abdominal distention, abdominal pain, anal bleeding, blood in stool, constipation, nausea and vomiting.        As mentioned in hpi   Genitourinary: Negative for difficulty urinating and hematuria.   Musculoskeletal: Negative for arthralgias and back pain.   Skin: Negative for color change and rash.   Neurological: Positive for weakness.  Negative for dizziness, seizures, syncope, light-headedness and headaches.   Hematological: Negative for adenopathy.   Psychiatric/Behavioral: Negative for confusion. The patient is not nervous/anxious.         Objective     Vital Signs  Temp:  [97.3 °F (36.3 °C)-100.3 °F (37.9 °C)] 97.6 °F (36.4 °C)  Heart Rate:  [64-95] 74  Resp:  [16-18] 16  BP: (101-152)/(44-90) 111/44    Physical Exam:   Physical Exam   Constitutional: He appears well-developed and well-nourished. No distress.   HENT:   Head: Normocephalic and atraumatic.   Eyes: EOM are normal. No scleral icterus.   Neck: Neck supple. No JVD present.   Cardiovascular: Normal rate, regular rhythm and normal heart sounds.   Pulmonary/Chest: Effort normal and breath sounds normal.   Abdominal: Soft. Bowel sounds are normal. He exhibits no distension. There is no tenderness.   Musculoskeletal: Normal range of motion. He exhibits no deformity.   Neurological: He is alert.   Skin: Skin is warm and dry. No rash noted.   Psychiatric: He has a normal mood and affect. His behavior is normal.   Vitals reviewed.      Results Review:   I reviewed the patient's new clinical results.  I reviewed the patient's new imaging results and agree with the interpretation.  I reviewed the patient's other test results and agree with the interpretation    Lab Results (most recent)     Procedure Component Value Units Date/Time    Magnesium [[id number]]  (Abnormal) Collected:  08/20/20 0517    Specimen:  Blood Updated:  08/20/20 0612     Magnesium 0.9 mg/dL     Comprehensive Metabolic Panel [[id number]]  (Abnormal) Collected:  08/20/20 0517    Specimen:  Blood Updated:  08/20/20 0604     Glucose 99 mg/dL      BUN 43 mg/dL      Creatinine 2.90 mg/dL      Sodium 128 mmol/L      Potassium 3.1 mmol/L      Comment: Specimen hemolyzed.  Results may be affected.        Chloride 100 mmol/L      CO2 12.0 mmol/L      Calcium 8.2 mg/dL      Total Protein 6.8 g/dL      Albumin 2.90 g/dL      ALT (SGPT)  13 U/L      AST (SGOT) 13 U/L      Alkaline Phosphatase 123 U/L      Total Bilirubin 0.2 mg/dL      eGFR Non African Amer 21 mL/min/1.73      Globulin 3.9 gm/dL      A/G Ratio 0.7 g/dL      BUN/Creatinine Ratio 14.8     Anion Gap 16.0 mmol/L     Narrative:       GFR Normal >60  Chronic Kidney Disease <60  Kidney Failure <15      Phosphorus [[id number]]  (Normal) Collected:  08/20/20 0517    Specimen:  Blood Updated:  08/20/20 0604     Phosphorus 3.4 mg/dL     CBC Auto Differential [[id number]]  (Abnormal) Collected:  08/20/20 0517    Specimen:  Blood Updated:  08/20/20 0544     WBC 15.18 10*3/mm3      RBC 2.64 10*6/mm3      Hemoglobin 8.5 g/dL      Hematocrit 24.4 %      MCV 92.4 fL      MCH 32.2 pg      MCHC 34.8 g/dL      RDW 14.2 %      RDW-SD 48.2 fl      MPV 9.9 fL      Platelets 152 10*3/mm3      Neutrophil % 75.9 %      Lymphocyte % 12.1 %      Monocyte % 10.7 %      Eosinophil % 0.3 %      Basophil % 0.3 %      Immature Grans % 0.7 %      Neutrophils, Absolute 11.52 10*3/mm3      Lymphocytes, Absolute 1.83 10*3/mm3      Monocytes, Absolute 1.63 10*3/mm3      Eosinophils, Absolute 0.04 10*3/mm3      Basophils, Absolute 0.05 10*3/mm3      Immature Grans, Absolute 0.11 10*3/mm3      nRBC 0.0 /100 WBC     Urinalysis, Microscopic Only - Urine, Clean Catch [[id number]]  (Abnormal) Collected:  08/19/20 1914    Specimen:  Urine, Clean Catch Updated:  08/19/20 1934     RBC, UA 6-12 /HPF      WBC, UA Too Numerous to Count /HPF      Bacteria, UA 1+ /HPF      Squamous Epithelial Cells, UA 0-2 /HPF      Hyaline Casts, UA --     Comment: .         Methodology Manual Light Microscopy    Urine Culture - Urine, Urine, Clean Catch [[id number]] Collected:  08/19/20 1914    Specimen:  Urine, Clean Catch Updated:  08/19/20 1934    Urinalysis With Culture If Indicated - Urine, Clean Catch [[id number]]  (Abnormal) Collected:  08/19/20 1914    Specimen:  Urine, Clean Catch Updated:  08/19/20 1930     Color, UA Yellow     Appearance, UA  Turbid     pH, UA 5.5     Specific Gravity, UA 1.008     Glucose, UA Negative     Ketones, UA Negative     Bilirubin, UA Negative     Blood, UA Moderate (2+)     Protein,  mg/dL (2+)     Leuk Esterase, UA Large (3+)     Nitrite, UA Positive     Urobilinogen, UA 0.2 E.U./dL    COVID PRE-OP / PRE-PROCEDURE SCREENING ORDER (NO ISOLATION) - Swab, Nasopharynx [[id number]] Collected:  08/19/20 1855    Specimen:  Swab from Nasopharynx Updated:  08/19/20 1927    Narrative:       The following orders were created for panel order COVID PRE-OP / PRE-PROCEDURE SCREENING ORDER (NO ISOLATION) - Swab, Nasopharynx.  Procedure                               Abnormality         Status                     ---------                               -----------         ------                     COVID-19, ABBOTT IN-HOUS...[[id number]]  Normal              Final result                 Please view results for these tests on the individual orders.    COVID-19, ABBOTT IN-HOUSE,NP Swab (NO TRANSPORT MEDIA) 2 HR TAT - Swab, Nasopharynx [[id number]]  (Normal) Collected:  08/19/20 1855    Specimen:  Swab from Nasopharynx Updated:  08/19/20 1927     COVID19 Not Detected    Narrative:       Fact sheet for providers: https://www.fda.gov/media/802303/download     Fact sheet for patients: https://www.fda.gov/media/920720/download    Lactic Acid, Plasma [[id number]]  (Normal) Collected:  08/19/20 1854    Specimen:  Blood Updated:  08/19/20 1919     Lactate 1.2 mmol/L     Blood Culture - Blood, Arm, Right [[id number]] Collected:  08/19/20 1854    Specimen:  Blood from Arm, Right Updated:  08/19/20 1904    Blood Culture - Blood, Arm, Left [[id number]] Collected:  08/19/20 1854    Specimen:  Blood from Arm, Left Updated:  08/19/20 1904    Procalcitonin [[id number]]  (Abnormal) Collected:  08/19/20 1614    Specimen:  Blood Updated:  08/19/20 1837     Procalcitonin 3.27 ng/mL     Narrative:       As a Marker for Sepsis (Non-Neonates):   1. <0.5 ng/mL  "represents a low risk of severe sepsis and/or septic shock.  1. >2 ng/mL represents a high risk of severe sepsis and/or septic shock.    As a Marker for Lower Respiratory Tract Infections that require antibiotic therapy:  PCT on Admission     Antibiotic Therapy             6-12 Hrs later  > 0.5                Strongly Recommended            >0.25 - <0.5         Recommended  0.1 - 0.25           Discouraged                   Remeasure/reassess PCT  <0.1                 Strongly Discouraged          Remeasure/reassess PCT      As 28 day mortality risk marker: \"Change in Procalcitonin Result\" (> 80 % or <=80 %) if Day 0 (or Day 1) and Day 4 values are available. Refer to http://[email or web address] [email or web address] – TulsaDuettopct-calculator.com/   Change in PCT <=80 %   A decrease of PCT levels below or equal to 80 % defines a positive change in PCT test result representing a higher risk for 28-day all-cause mortality of patients diagnosed with severe sepsis or septic shock.  Change in PCT > 80 %   A decrease of PCT levels of more than 80 % defines a negative change in PCT result representing a lower risk for 28-day all-cause mortality of patients diagnosed with severe sepsis or septic shock.                Results may be falsely decreased if patient taking Biotin.     C-reactive Protein [[id number]]  (Abnormal) Collected:  08/19/20 1614    Specimen:  Blood Updated:  08/19/20 1830     C-Reactive Protein 18.13 mg/dL     [facility] Draw [[id number]] Collected:  08/19/20 1614    Specimen:  Blood Updated:  08/19/20 1715    Narrative:       The following orders were created for panel order [facility] Draw.  Procedure                               Abnormality         Status                     ---------                               -----------         ------                     Light Blue Top[[id number]]                                   Final result               Green Top (Gel)[[id number]]                                  Final result               Lavender " Top[[id number]]                                     Final result               Red Top[[id number]]                                          Final result                 Please view results for these tests on the individual orders.    Green Top (Gel) [[id number]] Collected:  08/19/20 1614    Specimen:  Blood Updated:  08/19/20 1715     Extra Tube Hold for add-ons.     Comment: Auto resulted.       Lavender Top [[id number]] Collected:  08/19/20 1614    Specimen:  Blood Updated:  08/19/20 1715     Extra Tube hold for add-on     Comment: Auto resulted       Red Top [[id number]] Collected:  08/19/20 1614    Specimen:  Blood Updated:  08/19/20 1715     Extra Tube Hold for add-ons.     Comment: Auto resulted.       Light Blue Top [[id number]] Collected:  08/19/20 1614    Specimen:  Blood from Arm, Left Updated:  08/19/20 1715     Extra Tube hold for add-on     Comment: Auto resulted       Troponin [[id number]]  (Abnormal) Collected:  08/19/20 1614    Specimen:  Blood Updated:  08/19/20 1658     Troponin T 0.051 ng/mL     Narrative:       Troponin T Reference Range:  <= 0.03 ng/mL-   Negative for AMI  >0.03 ng/mL-     Abnormal for myocardial necrosis.  Clinicians would have to utilize clinical acumen, EKG, Troponin and serial changes to determine if it is an Acute Myocardial Infarction or myocardial injury due to an underlying chronic condition.       Results may be falsely decreased if patient taking Biotin.      Comprehensive Metabolic Panel [[id number]]  (Abnormal) Collected:  08/19/20 1614    Specimen:  Blood Updated:  08/19/20 1655     Glucose 115 mg/dL      BUN 50 mg/dL      Creatinine 3.26 mg/dL      Sodium 127 mmol/L      Potassium 3.2 mmol/L      Chloride 97 mmol/L      CO2 14.0 mmol/L      Calcium 8.9 mg/dL      Total Protein 8.0 g/dL      Albumin 3.60 g/dL      ALT (SGPT) 16 U/L      AST (SGOT) 15 U/L      Alkaline Phosphatase 144 U/L      Total Bilirubin 0.3 mg/dL      eGFR Non African Amer 19 mL/min/1.73      Globulin  4.4 gm/dL      A/G Ratio 0.8 g/dL      BUN/Creatinine Ratio 15.3     Anion Gap 16.0 mmol/L     Narrative:       GFR Normal >60  Chronic Kidney Disease <60  Kidney Failure <15      BNP [[id number]]  (Abnormal) Collected:  08/19/20 1614    Specimen:  Blood Updated:  08/19/20 1650     proBNP 8,244.0 pg/mL     Narrative:       Among patients with dyspnea, NT-proBNP is highly sensitive for the detection of acute congestive heart failure. In addition NT-proBNP of <300 pg/ml effectively rules out acute congestive heart failure with 99% negative predictive value.    Results may be falsely decreased if patient taking Biotin.      Protime-INR [[id number]]  (Abnormal) Collected:  08/19/20 1614    Specimen:  Blood from Arm, Left Updated:  08/19/20 1641     Protime 17.9 Seconds      INR 1.51    CBC & Differential [[id number]] Collected:  08/19/20 1614    Specimen:  Blood Updated:  08/19/20 1634    Narrative:       The following orders were created for panel order CBC & Differential.  Procedure                               Abnormality         Status                     ---------                               -----------         ------                     CBC Auto Differential[[id number]]        Abnormal            Final result                 Please view results for these tests on the individual orders.    CBC Auto Differential [[id number]]  (Abnormal) Collected:  08/19/20 1614    Specimen:  Blood Updated:  08/19/20 1634     WBC 22.13 10*3/mm3      RBC 3.01 10*6/mm3      Hemoglobin 9.6 g/dL      Hematocrit 27.7 %      MCV 92.0 fL      MCH 31.9 pg      MCHC 34.7 g/dL      RDW 14.1 %      RDW-SD 47.8 fl      MPV 9.5 fL      Platelets 215 10*3/mm3      Neutrophil % 75.8 %      Lymphocyte % 11.4 %      Monocyte % 11.4 %      Eosinophil % 0.1 %      Basophil % 0.3 %      Immature Grans % 1.0 %      Neutrophils, Absolute 16.75 10*3/mm3      Lymphocytes, Absolute 2.53 10*3/mm3      Monocytes, Absolute 2.52 10*3/mm3      Eosinophils,  Absolute 0.03 10*3/mm3      Basophils, Absolute 0.07 10*3/mm3      Immature Grans, Absolute 0.23 10*3/mm3      nRBC 0.0 /100 WBC           Radiology:  Imaging Results (Last 24 Hours)     Procedure Component Value Units Date/Time    XR Chest 1 View [[id number]] Collected:  08/19/20 1840     Updated:  08/19/20 1845    Narrative:       EXAMINATION: XR CHEST 1 VW-. 8/19/2020 6:40 PM CDT     CHEST, ONE VIEW:     HISTORY: Weakness, renal failure     COMPARISON: 7/4/2009 and a 3/20/2007     A single frontal chest radiograph was obtained.     FINDINGS:     COPD and chronic lung changes identified.     There are no acute infiltrates.     The heart is normal in size with changes from median sternotomy. The  pulmonary circulation appropriate, without heart failure.     Bony structures are intact.                                     Impression:       1. COPD/chronic change.  2. No acute cardiopulmonary process.     This report was finalized on 08/19/2020 18:41 by Dr. Rangel Castro MD.            Assessment/Plan       AMAYA (acute kidney injury) (CMS/HCC)    Hyponatremia    Metabolic acidosis, increased anion gap    Hypomagnesemia    Crohn's disease of large intestine without complication (CMS/HCC)    3-vessel CAD    Hyperlipidemia    PAD (peripheral artery disease) (CMS/HCC)    Cystitis    Hypokalemia      1. Diarrhea  2. Crohn's disease of the large intestine       He continues to have 2-3 loose stools daily.  He states that the diarrhea has not gotten any worse.  His main complaint is of weakness.  He started on Apriso July 30, 2020 however he is taking 1 tablet daily and was prescribed to take 4 tablets daily. Will make sure he is taking 4 tablets daily.  He does have history of avascular necrosis of the hip so would be reluctant to place him on steroids.  Other options would be immunosuppressive drugs or biologics.  For now will increase Apriso to 4 tablets daily and continue Questran and see how he responds. Dr. Brewster to see  and evaluate today as well.         I discussed the patients findings and my recommendations with patient      EMR Dragon/transcription disclaimer:  Much of this encounter note is electronic transcription/translation of spoken language to printed text.  The electronic translation of spoken language may be erroneous, or at times, nonsensical words or phrases may be inadvertently transcribed.  Although I have reviewed the note for such errors, some may still exist.    Zuleika Schulte APRN 12:08 PM    · I have seen the patient personally with evaluation and plan of care reviewed and developed with APRN and nursing staff. Where indicated, I have addended and/or modified the above history of present illness, physical examination, and assessment and plan to reflect my findings and impressions. Essential elements of the care plan were discussed with APRN above.  Agree with findings and assessment/plan as documented above.    When he was in the office at the end of July he was having on average 1 loose bowel movement a day.  He now tells me he is having 2 loose bowel movements a day.  He can go up to 4 bowel movements but that is not the usual.  He continues on Questran.  Unfortunately was only taking Apriso 1 tablet daily instead of the prescribed 4 tablets daily.    At this time he is not having significant GI symptoms.  No abdominal pain or diarrhea.  I talked to him once again about the different treatment options of Crohn's disease.  Talked about the pluses and minuses of Apriso or similar agents.  We talked about steroids and we talked about immunosuppressive agents such as Biologics.  Questions were answered.  He does not wish to pursue step up therapy.  He does understand that he should be taken for Apriso tablets daily instead of 1.  He agrees to increase this to 4 tablets.  He would like to continue on this route.  I think this is reasonable given his overall clinical condition.  There is a chance we may step up  therapy at some point but he is not ready to do so yet.  We will increase his Apriso to 4 times daily.  He should continue on Questran once daily.  He has appointment to follow-up in the office on September 29 and he will keep that appointment.  No further GI plans at this time.    Adrein Brewster MD  08/20/20  15:20

## 2020-08-20 NOTE — PROGRESS NOTES
[facility] Medicine Services  INPATIENT PROGRESS NOTE    Length of Stay: 1  Date of Admission: 8/19/2020  Primary Care Physician: Joe Velasco MD    Subjective   Chief Complaint: Follow-up diarrhea  HPI   Patient resting in bed.  At the time of his last hospital discharge on 7/15, the patient stated his diarrhea had resolved.  He had been using Questran daily while hospitalized, and was prescribed Questran to use daily as needed for diarrhea at discharge.  He states since the time of his last discharge, his diarrhea has returned.  He describes having 3-5 bowel movements per day.  He does not note any blood in his stools.  This does have a mucus-like consistency.  He denies abdominal pain.  He denies nausea or vomiting, and states he does have a good appetite.  Unfortunately, he does note he has to move his bowels very shortly after eating.  He denies any chest pain or shortness of breath.  He denies any urinary complaints.  He has not had fever at home.    Review of Systems   All pertinent negatives and positives are as above. All other systems have been reviewed and are negative unless otherwise stated.     Objective    Temp:  [97.3 °F (36.3 °C)-100.3 °F (37.9 °C)] 99.3 °F (37.4 °C)  Heart Rate:  [64-95] 87  Resp:  [16-18] 16  BP: (101-152)/(46-90) 146/49  Physical Exam   Constitutional: He is oriented to person, place, and time. He appears well-developed and well-nourished. No distress.   HENT:   Head: Normocephalic and atraumatic.   Neck: Normal range of motion. Neck supple. No JVD present. No tracheal deviation present.   Cardiovascular: Normal rate, regular rhythm and intact distal pulses. Exam reveals no gallop and no friction rub.   Sinus-sinus tach  with frequent PVCs   Pulmonary/Chest: Effort normal and breath sounds normal. He has no wheezes. He has no rales.   Abdominal: Soft. Bowel sounds are normal. He exhibits no distension. There is no tenderness. There  is no guarding.   Musculoskeletal: Normal range of motion. He exhibits no edema or tenderness.   Neurological: He is alert and oriented to person, place, and time. No cranial nerve deficit.   Skin: Skin is warm and dry. No rash noted. No erythema.   Psychiatric: He has a normal mood and affect. His behavior is normal. Judgment and thought content normal.   Vitals reviewed.    Results Review:  I have reviewed the labs, radiology results, and diagnostic studies.    Laboratory Data:   Results from last 7 days   Lab Units 08/20/20  0517 08/19/20  1614   WBC 10*3/mm3 15.18* 22.13*   HEMOGLOBIN g/dL 8.5* 9.6*   HEMATOCRIT % 24.4* 27.7*   PLATELETS 10*3/mm3 152 215        Results from last 7 days   Lab Units 08/20/20  0517 08/19/20  1614   SODIUM mmol/L 128* 127*   POTASSIUM mmol/L 3.1* 3.2*   CHLORIDE mmol/L 100 97*   CO2 mmol/L 12.0* 14.0*   BUN mg/dL 43* 50*   CREATININE mg/dL 2.90* 3.26*   CALCIUM mg/dL 8.2* 8.9   BILIRUBIN mg/dL 0.2 0.3   ALK PHOS U/L 123* 144*   ALT (SGPT) U/L 13 16   AST (SGOT) U/L 13 15   GLUCOSE mg/dL 99 115*     Radiology Data:   Imaging Results (Last 24 Hours)     Procedure Component Value Units Date/Time    XR Chest 1 View [[id number]] Collected:  08/19/20 1840     Updated:  08/19/20 1845    Narrative:       EXAMINATION: XR CHEST 1 VW-. 8/19/2020 6:40 PM CDT     CHEST, ONE VIEW:     HISTORY: Weakness, renal failure     COMPARISON: 7/4/2009 and a 3/20/2007     A single frontal chest radiograph was obtained.     FINDINGS:     COPD and chronic lung changes identified.     There are no acute infiltrates.     The heart is normal in size with changes from median sternotomy. The  pulmonary circulation appropriate, without heart failure.     Bony structures are intact.                                     Impression:       1. COPD/chronic change.  2. No acute cardiopulmonary process.     This report was finalized on 08/19/2020 18:41 by Dr. Rangel Castro MD.        I have reviewed the patient current  medications.     Assessment/Plan     Active Hospital Problems    Diagnosis   • Hypokalemia   • Cystitis   • 3-vessel CAD   • Hyperlipidemia   • PAD (peripheral artery disease) (CMS/[facility])   • Crohn's disease of large intestine without complication (CMS/[facility])     Colonoscopy July 2020 revealed mild patchy scattered hemosiderin staining with inflammation more so in rectosigmoid area.  [facility] lab IBD first step consistent with Crohn's     • Hypomagnesemia   • AMAYA (acute kidney injury) (CMS/[facility])   • Hyponatremia   • Metabolic acidosis, increased anion gap     Plan:  1.  Patient admitted 8/19 with complaints of weakness.  He was recently admitted from 7/11 through 7/15 with an acute kidney injury felt secondary to diarrhea.    2.  At time of discharge on 7/15, the patient's creatinine was 3.1.  On outpatient labs on 7/22 his creatinine was 2.6 and 8/11 was 2.3.  His creatinine on admission last night was 3.2, 2.9 today.  Feel this is likely secondary to his ongoing diarrhea.  Nephrology has been consulted, appreciate their assistance.  Continue present IV fluids for now, may need to consider addition of bicarb to Iv fluids, would defer this to nephrology.  We will add oral sodium bicarbonate for now.  3.  Urinalysis positive for nitrites, leukocytes, too numerous to count white blood cells, and 1+ bacteria.  Continue Rocephin for now, urine and blood cultures presently pending.  During patient's previous hospitalization, he was noted to have E. coli UTI/bacteremia and was discharged with Omnicef to complete a 14-day course.  His surveillance blood cultures drawn on 7/13 showed no growth at 5 days.  4.  Review of office notes with Dr. Brewster on 7/30.  The patient had a colonoscopy in July 2020 which showed mild patchy scattered hemosiderin staining with inflammation more so in the rectosigmoid area.  The [facility] lab IBD first step was consistent with Crohn's.  Different treatment options were discussed at this point  in time, however given avascular necrosis of his hip he is not felt to be appropriate for steroid therapy.  It was also not felt to be beneficial at this point in time to start an immunosuppressant drug amidst the coronavirus pandemic.  He had a negative GI panel on his last admission, negative C. difficile testing in June.  Do not feel stool cultures need to be repeated at this time given lab studies consistent with Crohn's.  Will schedule cholestyramine instead of using as needed for now.  Continue mesalamine, patient states he has been compliant with this.  Will consult gastroenterology to see if there is anything else to offer at this time for his diarrhea.  5.  Magnesium replacement oral and IV.  Potassium replacement 40 mEq p.o. x1.  6.  BNP/Troponin likely elevated secondary to renal failure.  Will not continue to trend.  Patient has no complaints of chest pain.  7.  Labs in a.m.    Discharge Planning: I expect the patient to be discharged to home in 2-3 days.    Electronically signed by STERLING Nina, 8/20/2020, 11:14.

## 2020-08-20 NOTE — PLAN OF CARE
Problem: Patient Care Overview  Goal: Plan of Care Review  Outcome: Ongoing (interventions implemented as appropriate)  Flowsheets  Taken 8/20/2020 1205 by Raiza Cancino, PT DPT  Progress: no change  Outcome Summary: PT eval completed. Pt alert and oriented x3 with c/o weakness. Pt performed bed mob mod I with HOB elevated, and displays dec strength in BLE. Pt performed sit to stand and gait with CGA, displaying 1 slight LOB that was self-corrected and dec endurance. Pt reported dec LE endurance during gait as well.  Pt will benefit from skilled PT to improve endurance and strength. Anticipate d/c home with HH.

## 2020-08-20 NOTE — PLAN OF CARE
Problem: Patient Care Overview  Goal: Plan of Care Review  Flowsheets (Taken 8/20/2020 1202)  Plan of Care Reviewed With: patient  Outcome Summary: OT eval completed. Pt is A&Ox4. Demo's decreased balance and activity tolerance/endurance. Mod I for bed mobility. Independently able to complete toileting with urinal. CGA for all sit <> stand t/f from EOB and all functional mobility. Will defer balance and endurance training to PT. OT to sign off. Anticipate d/c home with assist and HH.

## 2020-08-20 NOTE — PLAN OF CARE
Problem: Patient Care Overview  Goal: Plan of Care Review  Outcome: Ongoing (interventions implemented as appropriate)  Flowsheets (Taken 8/20/2020 0314)  Progress: no change  Plan of Care Reviewed With: patient  Note:   Pt denies pain or nausea. IVF and IV abx. Sepsis positive. Voiding per urinal. Tele. Neph consult this AM. VSS. Will cont to monitor.

## 2020-08-20 NOTE — THERAPY EVALUATION
Patient Name: Ricardo Hugo  : 1950    MRN: [id number]                              Today's Date: 2020       Admit Date: 2020    Visit Dx:     ICD-10-CM ICD-9-CM   1. Bacterial urinary infection N39.0 599.0    A49.9 041.9   2. Acute renal failure, unspecified acute renal failure type (CMS/[facility]) N17.9 584.9   3. Decreased activities of daily living (ADL) Z78.9 V49.89   4. Impaired mobility Z74.09 799.89     Patient Active Problem List   Diagnosis   • Eustachian tube dysfunction   • Chronic rhinitis   • Asymmetrical sensorineural hearing loss   • History of adenomatous polyp of colon   • Atherosclerosis of native artery of both lower extremities with intermittent claudication (CMS/[facility])   • Accelerated hypertension   • Diarrhea of infectious origin   • Anxiety disorder   • Bilateral leg edema   • Ischemic colitis (CMS/[facility])   • Diarrhea   • AMAYA (acute kidney injury) (CMS/[facility])   • Avascular necrosis of femoral head, left (CMS/[facility])   • Hyponatremia   • Acute cystitis   • Metabolic acidosis, increased anion gap   • Weight loss   • E coli bacteremia   • Iron deficiency anemia   • Hypomagnesemia   • Ventricular tachycardia, nonsustained (CMS/[facility])   • Crohn's disease of large intestine without complication (CMS/[facility])   • 3-vessel CAD   • COPD exacerbation (CMS/[facility])   • Displacement of lumbar intervertebral disc without myelopathy   • ED (erectile dysfunction) of organic origin   • Hyperlipidemia   • Hypertension, benign   • Idiopathic acroosteolysis   • Impaired fasting blood sugar   • PAD (peripheral artery disease) (CMS/[facility])   • Personal history of alcoholism (CMS/[facility])   • Prostatic hypertrophy   • Proteinuria   • Pernicious anemia   • Cystitis   • Hypokalemia     Past Medical History:   Diagnosis Date   • Acid reflux    • Allergic rhinitis    • Chronic mucoid otitis media    • Chronic rhinitis    • Chronic sinusitis    • Coronary artery disease     HEART BYPASS    • Eustachian tube dysfunction    •  Heart disease    • Hypertension    • Mixed hearing loss of left ear    • Perianal abscess    • Sensorineural hearing loss    • Tinnitus      Past Surgical History:   Procedure Laterality Date   • COLONOSCOPY N/A 7/2/2020    Procedure: COLONOSCOPY WITH ANESTHESIA;  Surgeon: Adrien Brewster MD;  Location: [facility] ENDOSCOPY;  Service: Gastroenterology;  Laterality: N/A;  pre op: diarrhea  post op: polyps  PCP: Joe Velasco MD   • CORONARY ARTERY BYPASS GRAFT     • INCISION AND DRAINAGE PERIRECTAL ABSCESS N/A 3/3/2017    Procedure: INCISION AND DRAINAGE OF JEET ANAL ABSCESS;  Surgeon: Lynette Smith MD;  Location: [facility] OR;  Service:    • MYRINGOTOMY W/ TUBES Left 04/17/2017    06/10/2016   • TONSILLECTOMY     • TOTAL HIP ARTHROPLASTY       General Information     Row Name 08/20/20 1130          PT Evaluation Time/Intention    Document Type  evaluation dx: bacterial UTI and acute renal failure  -SB     Mode of Treatment  physical therapy  -SB     Row Name 08/20/20 1130          General Information    Patient Profile Reviewed?  yes  -SB     Prior Level of Function  independent:;all household mobility;ADL's;driving;cooking;cleaning  -SB     Existing Precautions/Restrictions  fall  -SB     Barriers to Rehab  none identified  -SB     Row Name 08/20/20 1130          Relationship/Environment    Lives With  alone  -SB     Row Name 08/20/20 1130          Resource/Environmental Concerns    Current Living Arrangements  home/apartment/condo step over tub and walk in shower, shower chair, w/c, BSC, RW  -SB     Row Name 08/20/20 1130          Home Main Entrance    Number of Stairs, Main Entrance  two  -SB     Stair Railings, Main Entrance  railings on both sides of stairs  -SB     Row Name 08/20/20 1130          Stairs Within Home, Primary    Number of Stairs, Within Home, Primary  none  -SB     Row Name 08/20/20 1130          Cognitive Assessment/Intervention- PT/OT    Orientation Status (Cognition)  oriented x 3  -SB      Row Name 08/20/20 1130          Safety Issues, Functional Mobility    Impairments Affecting Function (Mobility)  balance;endurance/activity tolerance  -SB       User Key  (r) = Recorded By, (t) = Taken By, (c) = Cosigned By    Initials Name Provider Type    Raiza Michael, PT DPT Physical Therapist        Mobility     Row Name 08/20/20 1130          Bed Mobility Assessment/Treatment    Bed Mobility Assessment/Treatment  supine-sit  -SB     Supine-Sit [place] (Bed Mobility)  conditional independence  -SB     Assistive Device (Bed Mobility)  head of bed elevated  -SB     Comment (Bed Mobility)  left sitting up in chair  -SB     Row Name 08/20/20 1130          Sit-Stand Transfer    Sit-Stand [place] (Transfers)  contact guard  -SB     Row Name 08/20/20 1130          Gait/Stairs Assessment/Training    Gait/Stairs Assessment/Training  gait/ambulation independence  -SB     [place] Level (Gait)  contact guard  -SB     Distance in Feet (Gait)  40x2  -SB     Comment (Gait/Stairs)  1 small LOB that was self-corrected  -SB       User Key  (r) = Recorded By, (t) = Taken By, (c) = Cosigned By    Initials Name Provider Type    Raiza Michael, PT DPT Physical Therapist        Obj/Interventions     Row Name 08/20/20 1130          General ROM    GENERAL ROM COMMENTS  BLE WFL  -SB     Row Name 08/20/20 1130          MMT (Manual Muscle Testing)    General MMT Comments  BLE 4/5  -SB     Row Name 08/20/20 1130          Static Sitting Balance    Level of [place] (Unsupported Sitting, Static Balance)  supervision  -SB     Sitting Position (Unsupported Sitting, Static Balance)  sitting on edge of bed  -SB     Row Name 08/20/20 1130          Static Standing Balance    Level of [place] (Supported Standing, Static Balance)  standby assist  -SB     Row Name 08/20/20 1130          Sensory Assessment/Intervention    Sensory General Assessment  no sensation deficits identified  -SB       User Key  (r) = Recorded By,  (t) = Taken By, (c) = Cosigned By    Initials Name Provider Type    SB Raiza Cancino, PT DPT Physical Therapist        Goals/Plan     Row Name 08/20/20 1130          Bed Mobility Goal 1 (PT)    Activity/Assistive Device (Bed Mobility Goal 1, PT)  bed mobility activities, all  -SB     [place] Level/Cues Needed (Bed Mobility Goal 1, PT)  independent  -SB     Time Frame (Bed Mobility Goal 1, PT)  long term goal (LTG)  -SB     Progress/Outcomes (Bed Mobility Goal 1, PT)  goal ongoing  -SB     Row Name 08/20/20 1130          Transfer Goal 1 (PT)    Activity/Assistive Device (Transfer Goal 1, PT)  sit-to-stand/stand-to-sit;bed-to-chair/chair-to-bed  -SB     [place] Level/Cues Needed (Transfer Goal 1, PT)  independent  -SB     Time Frame (Transfer Goal 1, PT)  long term goal (LTG)  -SB     Progress/Outcome (Transfer Goal 1, PT)  goal ongoing  -SB     Row Name 08/20/20 1130          Gait Training Goal 1 (PT)    Activity/Assistive Device (Gait Training Goal 1, PT)  gait (walking locomotion);improve balance and speed;increase endurance/gait distance;increase energy conservation;decrease fall risk  -SB     [place] Level (Gait Training Goal 1, PT)  supervision required  -SB     Distance (Gait Goal 1, PT)  200  -SB     Time Frame (Gait Training Goal 1, PT)  long term goal (LTG)  -SB     Progress/Outcome (Gait Training Goal 1, PT)  goal ongoing  -SB     Row Name 08/20/20 1130          Stairs Goal 1 (PT)    Activity/Assistive Device (Stairs Goal 1, PT)  stairs, all skills;using handrail, right;using handrail, left  -SB     [place] Level/Cues Needed (Stairs Goal 1, PT)  supervision required  -SB     Number of Stairs (Stairs Goal 1, PT)  2  -SB     Time Frame (Stairs Goal 1, PT)  long term goal (LTG)  -SB     Progress/Outcome (Stairs Goal 1, PT)  goal ongoing  -SB       User Key  (r) = Recorded By, (t) = Taken By, (c) = Cosigned By    Initials Name Provider Type    SB Raiza Cancino, PT DPT Physical Therapist         Clinical Impression     Row Name 08/20/20 1130          Pain Assessment    Additional Documentation  Pain Scale: Numbers Pre/Post-Treatment (Group)  -SB     Row Name 08/20/20 1130          Pain Scale: Numbers Pre/Post-Treatment    Pain Scale: Numbers, Pretreatment  0/10 - no pain  -SB     Pre/Post Treatment Pain Comment  c/o weakness  -SB     Pain Intervention(s)  Medication (See MAR);Ambulation/increased activity;Repositioned  -SB     Row Name 08/20/20 1130          Plan of Care Review    Plan of Care Reviewed With  patient  -SB     Progress  no change  -SB     Outcome Summary  PT eval completed. Pt alert and oriented x3 with c/o weakness. Pt performed bed mob mod I with HOB elevated, and displays dec strength in BLE. Pt performed sit to stand and gait with CGA, displaying 1 slight LOB that was self-corrected and dec endurance. Pt reported dec LE endurance during gait as well.  Pt will benefit from skilled PT to improve endurance and strength. Anticipate d/c home with HH.   -SB     Row Name 08/20/20 1130          Physical Therapy Clinical Impression    Patient/Family Goals Statement (PT Clinical Impression)  go home  -SB     Criteria for Skilled Interventions Met (PT Clinical Impression)  yes;treatment indicated  -SB     Rehab Potential (PT Clinical Summary)  good, to achieve stated therapy goals  -SB     Predicted Duration of Therapy (PT)  until d/c  -SB     Row Name 08/20/20 1130          Vital Signs    O2 Delivery Pre Treatment  room air  -SB     Row Name 08/20/20 1130          Positioning and Restraints    Pre-Treatment Position  in bed  -SB     Post Treatment Position  chair  -SB     In Chair  sitting;call light within reach;encouraged to call for assist  -SB       User Key  (r) = Recorded By, (t) = Taken By, (c) = Cosigned By    Initials Name Provider Type    Raiza Michael, PT DPT Physical Therapist        Outcome Measures     Row Name 08/20/20 1130          How much help from another person do you  currently need...    Turning from your back to your side while in flat bed without using bedrails?  4  -SB     Moving from lying on back to sitting on the side of a flat bed without bedrails?  4  -SB     Moving to and from a bed to a chair (including a wheelchair)?  4  -SB     Standing up from a chair using your arms (e.g., wheelchair, bedside chair)?  4  -SB     Climbing 3-5 steps with a railing?  3  -SB     To walk in hospital room?  3  -SB     AM-PAC 6 Clicks Score (PT)  22  -SB     Row Name 08/20/20 1130          Functional Assessment    Outcome Measure Options  AM-PAC 6 Clicks Basic Mobility (PT)  -SB       User Key  (r) = Recorded By, (t) = Taken By, (c) = Cosigned By    Initials Name Provider Type    Raiza Michael, PT DPT Physical Therapist        Physical Therapy Education                 Title: PT OT SLP Therapies (In Progress)     Topic: Physical Therapy (In Progress)     Point: Mobility training (Done)     Description:   Instruct learner(s) on safety and technique for assisting patient out of bed, chair or wheelchair.  Instruct in the proper use of assistive devices, such as walker, crutches, cane or brace.              Patient Friendly Description:   It's important to get you on your feet again, but we need to do so in a way that is safe for you. Falling has serious consequences, and your personal safety is the most important thing of all.        When it's time to get out of bed, one of us or a family member will sit next to you on the bed to give you support.     If your doctor or nurse tells you to use a walker, crutches, a cane, or a brace, be sure you use it every time you get out of bed, even if you think you don't need it.    Learning Progress Summary           Patient Acceptance, E, VU by SB at 8/20/2020 1204    Comment:  pt edu on POC, benefits of act, d/c plans                   Point: Home exercise program (Not Started)     Description:   Instruct learner(s) on appropriate technique for  monitoring, assisting and/or progressing patient with therapeutic exercises and activities.              Learner Progress:   Not documented in this visit.          Point: Body mechanics (Not Started)     Description:   Instruct learner(s) on proper positioning and spine alignment for patient and/or caregiver during mobility tasks and/or exercises.              Learner Progress:   Not documented in this visit.          Point: Precautions (Done)     Description:   Instruct learner(s) on prescribed precautions during mobility and gait tasks              Learning Progress Summary           Patient Acceptance, E, VU by SB at 8/20/2020 1204    Comment:  pt edu on POC, benefits of act, d/c plans                               User Key     Initials Effective Dates Name Provider Type Discipline    SB 10/31/19 -  Raiza Cancino, PT DPT Physical Therapist PT              PT Recommendation and Plan  Planned Therapy Interventions (PT Eval): balance training, bed mobility training, gait training, home exercise program, patient/family education, transfer training, strengthening, stair training  Outcome Summary/Treatment Plan (PT)  Anticipated Discharge Disposition (PT): home with assist, home with home health  Plan of Care Reviewed With: patient  Progress: no change  Outcome Summary: PT eval completed. Pt alert and oriented x3 with c/o weakness. Pt performed bed mob mod I with HOB elevated, and displays dec strength in BLE. Pt performed sit to stand and gait with CGA, displaying 1 slight LOB that was self-corrected and dec endurance. Pt reported dec LE endurance during gait as well.  Pt will benefit from skilled PT to improve endurance and strength. Anticipate d/c home with HH.     Time Calculation:   PT Charges     Row Name 08/20/20 1205             Time Calculation    Start Time  1130  -SB      Stop Time  1154  -SB      Time Calculation (min)  24 min  -SB      PT Received On  08/20/20  -SB      PT Goal Re-Cert Due Date  08/30/20   -SB        User Key  (r) = Recorded By, (t) = Taken By, (c) = Cosigned By    Initials Name Provider Type    SB Raiza Cancino, PT DPT Physical Therapist        Therapy Charges for Today     Code Description Service Date Service Provider Modifiers Qty    03175788025 HC PT EVAL LOW COMPLEXITY 2 8/20/2020 Raiza Cancino, PT DPT GP 1          PT G-Codes  Outcome Measure Options: AM-PAC 6 Clicks Daily Activity (OT)  AM-PAC 6 Clicks Score (PT): 22  AM-PAC 6 Clicks Score (OT): 23    Raiza Cancino PT DPT  8/20/2020

## 2020-08-20 NOTE — CONSULTS
Nephrology ([facility]) Consult Note      Patient:  Ricardo Hugo  YOB: 1950  Date of Service: 8/20/2020  MRN: [id number]   Acct: [id number]   Primary Care Physician: Joe Velasco MD  Advance Directive:   Code Status and Medical Interventions:   Ordered at: 08/19/20 2053     Level Of Support Discussed With:    Patient     Code Status:    CPR     Medical Interventions (Level of Support Prior to Arrest):    Full     Admit Date: 8/19/2020       Hospital Day: 1  Referring Provider: Shana Irizarry DO      Patient Seen, Chart, Consults, Notes, Labs, Radiology studies reviewed.        Subjective:  Ricardo Hugo is a 70 y.o. male  whom we were consulted for acute kidney injury. Patient has Crohn's disease with chronic recurrent diarrhea. He was admitted in July 2020 with a GI picture and was then found with creat at 4.38 (baseline at 1). On 7/22, his creat was 2.6. Patient is readmitted with diarrhea, abdominal pain and was also found with UTI. On admission, his creat was 3.2 and renal service was consulted for AMAYA. Patient noticed some drop in his urine output. He is now managed with IV fluids. He has no dysuria.     Allergies:  Lortab [hydrocodone-acetaminophen]    Home Meds:  Facility-Administered Medications Prior to Admission   Medication Dose Route Frequency Provider Last Rate Last Dose   • cyanocobalamin injection 1,000 mcg  1,000 mcg Intramuscular Q28 Days Joe Velasco MD   1,000 mcg at 08/17/20 1342     Medications Prior to Admission   Medication Sig Dispense Refill Last Dose   • albuterol (PROVENTIL HFA;VENTOLIN HFA) 108 (90 BASE) MCG/ACT inhaler Inhale 2 puffs 4 (Four) Times a Day As Needed for Wheezing or Shortness of Air.   Taking   • allopurinol (ZYLOPRIM) 100 MG tablet Take 1 tablet by mouth Daily. 30 tablet 2 Not Taking   • amLODIPine (NORVASC) 10 MG tablet TAKE 1 TABLET DAILY 90 tablet 3 Taking   • aspirin (ASPIR) 81 MG EC tablet Take 81 mg by  mouth Daily.   Taking   • aspirin-acetaminophen-caffeine (Excedrin Menstrual Complete) 250-250-65 MG per tablet Take 1 tablet by mouth Every 6 (Six) Hours As Needed for Headache.   Taking   • azelastine (ASTELIN) 0.1 % nasal spray 1 spray into the nostril(s) as directed by provider 2 (Two) Times a Day As Needed for Rhinitis.   Taking   • cholecalciferol (VITAMIN D3) 25 MCG (1000 UT) tablet Take 2 tablets by mouth Daily. 60 tablet 2 Not Taking   • cholestyramine (QUESTRAN) 4 g packet Take 1 packet by mouth Daily As Needed (Diarrhea). 60 each 0 Taking   • CloNIDine (CATAPRES) 0.2 MG tablet Take 0.2 mg by mouth 3 (Three) Times a Day.   Taking   • desoximetasone (TOPICORT) 0.25 % cream APPLY EXTERNALLY TO THE AFFECTED AREA TWICE DAILY AS DIRECTED 60 g 0 Taking   • diphenoxylate-atropine (LOMOTIL) 2.5-0.025 MG per tablet Take 1 tablet by mouth 4 (Four) Times a Day As Needed for Diarrhea.   Taking   • famotidine (PEPCID) 20 MG tablet Take 1 tablet by mouth Daily. 30 tablet 2 Not Taking   • fexofenadine (ALLEGRA) 180 MG tablet Take 180 mg by mouth Daily.   Taking   • fluticasone (FLONASE) 50 MCG/ACT nasal spray 2 sprays into the nostril(s) as directed by provider As Needed for Rhinitis.   Taking   • furosemide (LASIX) 20 MG tablet TAKE 1 TABLET BY MOUTH DAILY 90 tablet 3 Not Taking   • magnesium oxide (MAGOX) 400 (241.3 Mg) MG tablet tablet Take 1 tablet by mouth Daily. 30 each 2 Taking   • Melatonin 10 MG capsule Take 2 capsules by mouth Daily.   Taking   • mesalamine (APRISO) 0.375 g 24 hr capsule Take 1 capsule by mouth Daily. 120 capsule 11 Taking   • metoprolol succinate XL (TOPROL-XL) 25 MG 24 hr tablet Take 1 tablet by mouth Daily. 30 tablet 5 Taking   • Multiple Vitamins-Minerals (PRESERVISION/LUTEIN) capsule Take 1 capsule by mouth 2 (two) times a day.   Taking   • omeprazole (priLOSEC) 20 MG capsule Take 20 mg by mouth Daily.   Taking       Medicines:  Current Facility-Administered Medications   Medication Dose  Route Frequency Provider Last Rate Last Dose   • acetaminophen (TYLENOL) tablet 650 mg  650 mg Oral Q4H PRN Shana Irizarry DO       • albuterol (PROVENTIL) nebulizer solution 0.083% 2.5 mg/3mL  2.5 mg Nebulization 4x Daily PRN Shana Irizarry DO       • allopurinol (ZYLOPRIM) tablet 100 mg  100 mg Oral Daily Shana Irizarry DO   100 mg at 08/20/20 0927   • amLODIPine (NORVASC) tablet 10 mg  10 mg Oral Daily Shana Irizarry DO   10 mg at 08/20/20 0928   • aspirin EC tablet 81 mg  81 mg Oral Daily Shana Irizarry DO   81 mg at 08/20/20 0928   • aspirin-acetaminophen-caffeine (EXCEDRIN MIGRAINE) per tablet 1 tablet  1 tablet Oral Q6H PRN Shana Irizarry DO       • azelastine (ASTELIN) nasal spray 1 spray  1 spray Each Nare BID PRN Shana Irizarry DO       • cefTRIAXone (ROCEPHIN) 1 g/100 mL 0.9% NS (MBP)  1 g Intravenous Q24H Shana Irizarry DO       • cholecalciferol (VITAMIN D3) tablet 2,000 Units  2,000 Units Oral Daily Shana Irizarry DO   2,000 Units at 08/20/20 0927   • cholestyramine (QUESTRAN) packet 1 packet  1 packet Oral Daily Maria L Zambrano APRN   1 packet at 08/20/20 1209   • cloNIDine (CATAPRES) tablet 0.2 mg  0.2 mg Oral TID Shana Irizarry DO   0.2 mg at 08/20/20 0927   • diphenoxylate-atropine (LOMOTIL) 2.5-0.025 MG per tablet 1 tablet  1 tablet Oral 4x Daily PRN Shana Irizarry DO       • famotidine (PEPCID) tablet 10 mg  10 mg Oral Daily Shana Irizarry DO   10 mg at 08/20/20 0928   • magnesium oxide (MAGOX) tablet 400 mg  400 mg Oral BID Maria L Zambrano APRN   400 mg at 08/20/20 0927   • [START ON 8/21/2020] mesalamine (APRISO) 24 hr capsule 1.5 g  1.5 g Oral Daily Zuleika Schulte APRN       • metoprolol succinate XL (TOPROL-XL) 24 hr tablet 25 mg  25 mg Oral Daily Shana Irizarry DO   25 mg at 08/20/20 0928   • Ocuvite-Lutein (OCUVITE) tablet 1 tablet  1 tablet Oral Daily Shana Irizarry DO   1 tablet at 08/20/20 0927   • ondansetron  (ZOFRAN) injection 4 mg  4 mg Intravenous Q6H PRN Shana Irizarry, DO       • saccharomyces boulardii (FLORASTOR) capsule 250 mg  250 mg Oral BID Woeltz, Maria L K, APRN   250 mg at 08/20/20 0927   • sodium bicarbonate tablet 650 mg  650 mg Oral TID Woeltz, Maria L K, APRN   650 mg at 08/20/20 1209   • sodium chloride 0.9 % flush 10 mL  10 mL Intravenous Q12H Shana Irizarry DO   10 mL at 08/19/20 2133   • sodium chloride 0.9 % flush 10 mL  10 mL Intravenous PRN Shana Irizarry DO       • sodium chloride 0.9 % infusion  100 mL/hr Intravenous Continuous Shana Irizarry  mL/hr at 08/20/20 0640 100 mL/hr at 08/20/20 0640       Past Medical History:  Past Medical History:   Diagnosis Date   • Acid reflux    • Allergic rhinitis    • Chronic mucoid otitis media    • Chronic rhinitis    • Chronic sinusitis    • Coronary artery disease     HEART BYPASS 2004   • Eustachian tube dysfunction    • Heart disease    • Hypertension    • Mixed hearing loss of left ear    • Perianal abscess    • Sensorineural hearing loss    • Tinnitus        Past Surgical History:  Past Surgical History:   Procedure Laterality Date   • COLONOSCOPY N/A 7/2/2020    Procedure: COLONOSCOPY WITH ANESTHESIA;  Surgeon: Adrien Brewster MD;  Location: [facility] ENDOSCOPY;  Service: Gastroenterology;  Laterality: N/A;  pre op: diarrhea  post op: polyps  PCP: Joe Velasco MD   • CORONARY ARTERY BYPASS GRAFT     • INCISION AND DRAINAGE PERIRECTAL ABSCESS N/A 3/3/2017    Procedure: INCISION AND DRAINAGE OF JEET ANAL ABSCESS;  Surgeon: Lynette Smith MD;  Location: [facility] OR;  Service:    • MYRINGOTOMY W/ TUBES Left 04/17/2017    06/10/2016   • TONSILLECTOMY     • TOTAL HIP ARTHROPLASTY         Family History  Family History   Problem Relation Age of Onset   • No Known Problems Mother    • No Known Problems Father    • Colon cancer Neg Hx    • Colon polyps Neg Hx        Social History  Social History     Socioeconomic History   •  "Marital status:      Spouse name: Not on file   • Number of children: Not on file   • Years of education: Not on file   • Highest education level: Not on file   Tobacco Use   • Smoking status: Former Smoker   • Smokeless tobacco: Never Used   • Tobacco comment: quit 2010   Substance and Sexual Activity   • Alcohol use: Yes     Alcohol/week: 14.0 standard drinks     Types: 14 Cans of beer per week     Frequency: Monthly or less     Comment: occ   • Drug use: No   • Sexual activity: Defer         Review of Systems:  History obtained from chart review and the patient  General ROS: No fever or chills  Respiratory ROS: No cough, shortness of breath, wheezing  Cardiovascular ROS: no chest pain or dyspnea on exertion  Gastrointestinal ROS: + abdominal pain, no melena  Genito-Urinary ROS: No dysuria or hematuria  14 point ROS reviewed with the patient and negative except as noted above and in the HPI unless unable to obtain.    Objective:  /44 (BP Location: Left arm, Patient Position: Lying)   Pulse 74   Temp 97.6 °F (36.4 °C) (Oral)   Resp 16   Ht 182.9 cm (72\")   Wt 72.6 kg (160 lb)   SpO2 100%   BMI 21.70 kg/m²     Intake/Output Summary (Last 24 hours) at 8/20/2020 1445  Last data filed at 8/20/2020 1256  Gross per 24 hour   Intake 2650 ml   Output 1750 ml   Net 900 ml     General: awake/alert    Head: atraumatic, normocephalic  Neck: no masses, no JVD  Chest:  clear to auscultation bilaterally without respiratory distress  CVS: regular rate and rhythm  Abdominal: soft, nontender, normal bowel sounds  Extremities: no cyanosis or edema  Skin: warm and dry without rash  Neuro: No focal motor deficits  Musculoskeletal: No obvious joint effusions    Labs:  Lab Results (last 72 hours)     Procedure Component Value Units Date/Time    Urine Culture - Urine, Urine, Clean Catch [[id number]]  (Abnormal) Collected:  08/19/20 1914    Specimen:  Urine, Clean Catch Updated:  08/20/20 1248     Urine Culture >100,000 " CFU/mL Gram Negative Bacilli    Magnesium [[id number]]  (Abnormal) Collected:  08/20/20 0517    Specimen:  Blood Updated:  08/20/20 0612     Magnesium 0.9 mg/dL     Comprehensive Metabolic Panel [[id number]]  (Abnormal) Collected:  08/20/20 0517    Specimen:  Blood Updated:  08/20/20 0604     Glucose 99 mg/dL      BUN 43 mg/dL      Creatinine 2.90 mg/dL      Sodium 128 mmol/L      Potassium 3.1 mmol/L      Comment: Specimen hemolyzed.  Results may be affected.        Chloride 100 mmol/L      CO2 12.0 mmol/L      Calcium 8.2 mg/dL      Total Protein 6.8 g/dL      Albumin 2.90 g/dL      ALT (SGPT) 13 U/L      AST (SGOT) 13 U/L      Alkaline Phosphatase 123 U/L      Total Bilirubin 0.2 mg/dL      eGFR Non African Amer 21 mL/min/1.73      Globulin 3.9 gm/dL      A/G Ratio 0.7 g/dL      BUN/Creatinine Ratio 14.8     Anion Gap 16.0 mmol/L     Narrative:       GFR Normal >60  Chronic Kidney Disease <60  Kidney Failure <15      Phosphorus [[id number]]  (Normal) Collected:  08/20/20 0517    Specimen:  Blood Updated:  08/20/20 0604     Phosphorus 3.4 mg/dL     CBC Auto Differential [[id number]]  (Abnormal) Collected:  08/20/20 0517    Specimen:  Blood Updated:  08/20/20 0544     WBC 15.18 10*3/mm3      RBC 2.64 10*6/mm3      Hemoglobin 8.5 g/dL      Hematocrit 24.4 %      MCV 92.4 fL      MCH 32.2 pg      MCHC 34.8 g/dL      RDW 14.2 %      RDW-SD 48.2 fl      MPV 9.9 fL      Platelets 152 10*3/mm3      Neutrophil % 75.9 %      Lymphocyte % 12.1 %      Monocyte % 10.7 %      Eosinophil % 0.3 %      Basophil % 0.3 %      Immature Grans % 0.7 %      Neutrophils, Absolute 11.52 10*3/mm3      Lymphocytes, Absolute 1.83 10*3/mm3      Monocytes, Absolute 1.63 10*3/mm3      Eosinophils, Absolute 0.04 10*3/mm3      Basophils, Absolute 0.05 10*3/mm3      Immature Grans, Absolute 0.11 10*3/mm3      nRBC 0.0 /100 WBC     Urinalysis, Microscopic Only - Urine, Clean Catch [[id number]]  (Abnormal) Collected:  08/19/20 1914    Specimen:  Urine,  Clean Catch Updated:  08/19/20 1934     RBC, UA 6-12 /HPF      WBC, UA Too Numerous to Count /HPF      Bacteria, UA 1+ /HPF      Squamous Epithelial Cells, UA 0-2 /HPF      Hyaline Casts, UA --     Comment: .         Methodology Manual Light Microscopy    Urinalysis With Culture If Indicated - Urine, Clean Catch [[id number]]  (Abnormal) Collected:  08/19/20 1914    Specimen:  Urine, Clean Catch Updated:  08/19/20 1930     Color, UA Yellow     Appearance, UA Turbid     pH, UA 5.5     Specific Gravity, UA 1.008     Glucose, UA Negative     Ketones, UA Negative     Bilirubin, UA Negative     Blood, UA Moderate (2+)     Protein,  mg/dL (2+)     Leuk Esterase, UA Large (3+)     Nitrite, UA Positive     Urobilinogen, UA 0.2 E.U./dL    COVID PRE-OP / PRE-PROCEDURE SCREENING ORDER (NO ISOLATION) - Swab, Nasopharynx [[id number]] Collected:  08/19/20 1855    Specimen:  Swab from Nasopharynx Updated:  08/19/20 1927    Narrative:       The following orders were created for panel order COVID PRE-OP / PRE-PROCEDURE SCREENING ORDER (NO ISOLATION) - Swab, Nasopharynx.  Procedure                               Abnormality         Status                     ---------                               -----------         ------                     COVID-19, ABBOTT IN-HOUS...[[id number]]  Normal              Final result                 Please view results for these tests on the individual orders.    COVID-19, ABBOTT IN-HOUSE,NP Swab (NO TRANSPORT MEDIA) 2 HR TAT - Swab, Nasopharynx [[id number]]  (Normal) Collected:  08/19/20 1855    Specimen:  Swab from Nasopharynx Updated:  08/19/20 1927     COVID19 Not Detected    Narrative:       Fact sheet for providers: https://www.fda.gov/media/439278/download     Fact sheet for patients: https://www.fda.gov/media/964730/download    Lactic Acid, Plasma [[id number]]  (Normal) Collected:  08/19/20 1854    Specimen:  Blood Updated:  08/19/20 1919     Lactate 1.2 mmol/L     Blood Culture - Blood, Arm,  "Right [[id number]] Collected:  08/19/20 1854    Specimen:  Blood from Arm, Right Updated:  08/19/20 1904    Blood Culture - Blood, Arm, Left [[id number]] Collected:  08/19/20 1854    Specimen:  Blood from Arm, Left Updated:  08/19/20 1904    Procalcitonin [[id number]]  (Abnormal) Collected:  08/19/20 1614    Specimen:  Blood Updated:  08/19/20 1837     Procalcitonin 3.27 ng/mL     Narrative:       As a Marker for Sepsis (Non-Neonates):   1. <0.5 ng/mL represents a low risk of severe sepsis and/or septic shock.  1. >2 ng/mL represents a high risk of severe sepsis and/or septic shock.    As a Marker for Lower Respiratory Tract Infections that require antibiotic therapy:  PCT on Admission     Antibiotic Therapy             6-12 Hrs later  > 0.5                Strongly Recommended            >0.25 - <0.5         Recommended  0.1 - 0.25           Discouraged                   Remeasure/reassess PCT  <0.1                 Strongly Discouraged          Remeasure/reassess PCT      As 28 day mortality risk marker: \"Change in Procalcitonin Result\" (> 80 % or <=80 %) if Day 0 (or Day 1) and Day 4 values are available. Refer to http://www.[facility] WeatherfordPlacewordpct-calculator.com/   Change in PCT <=80 %   A decrease of PCT levels below or equal to 80 % defines a positive change in PCT test result representing a higher risk for 28-day all-cause mortality of patients diagnosed with severe sepsis or septic shock.  Change in PCT > 80 %   A decrease of PCT levels of more than 80 % defines a negative change in PCT result representing a lower risk for 28-day all-cause mortality of patients diagnosed with severe sepsis or septic shock.                Results may be falsely decreased if patient taking Biotin.     C-reactive Protein [[id number]]  (Abnormal) Collected:  08/19/20 1614    Specimen:  Blood Updated:  08/19/20 1830     C-Reactive Protein 18.13 mg/dL     [place] Draw [[id number]] Collected:  08/19/20 1614    Specimen:  Blood Updated:  08/19/20 1715  "    Narrative:       The following orders were created for panel order [place] Draw.  Procedure                               Abnormality         Status                     ---------                               -----------         ------                     Light Blue Top[[id number]]                                   Final result               Green Top (Gel)[[id number]]                                  Final result               Lavender Top[[id number]]                                     Final result               Red Top[[id number]]                                          Final result                 Please view results for these tests on the individual orders.    Green Top (Gel) [[id number]] Collected:  08/19/20 1614    Specimen:  Blood Updated:  08/19/20 1715     Extra Tube Hold for add-ons.     Comment: Auto resulted.       Lavender Top [[id number]] Collected:  08/19/20 1614    Specimen:  Blood Updated:  08/19/20 1715     Extra Tube hold for add-on     Comment: Auto resulted       Red Top [[id number]] Collected:  08/19/20 1614    Specimen:  Blood Updated:  08/19/20 1715     Extra Tube Hold for add-ons.     Comment: Auto resulted.       Light Blue Top [[id number]] Collected:  08/19/20 1614    Specimen:  Blood from Arm, Left Updated:  08/19/20 1715     Extra Tube hold for add-on     Comment: Auto resulted       Troponin [[id number]]  (Abnormal) Collected:  08/19/20 1614    Specimen:  Blood Updated:  08/19/20 1658     Troponin T 0.051 ng/mL     Narrative:       Troponin T Reference Range:  <= 0.03 ng/mL-   Negative for AMI  >0.03 ng/mL-     Abnormal for myocardial necrosis.  Clinicians would have to utilize clinical acumen, EKG, Troponin and serial changes to determine if it is an Acute Myocardial Infarction or myocardial injury due to an underlying chronic condition.       Results may be falsely decreased if patient taking Biotin.      Comprehensive Metabolic Panel [[id number]]  (Abnormal) Collected:  08/19/20 1614     Specimen:  Blood Updated:  08/19/20 1655     Glucose 115 mg/dL      BUN 50 mg/dL      Creatinine 3.26 mg/dL      Sodium 127 mmol/L      Potassium 3.2 mmol/L      Chloride 97 mmol/L      CO2 14.0 mmol/L      Calcium 8.9 mg/dL      Total Protein 8.0 g/dL      Albumin 3.60 g/dL      ALT (SGPT) 16 U/L      AST (SGOT) 15 U/L      Alkaline Phosphatase 144 U/L      Total Bilirubin 0.3 mg/dL      eGFR Non African Amer 19 mL/min/1.73      Globulin 4.4 gm/dL      A/G Ratio 0.8 g/dL      BUN/Creatinine Ratio 15.3     Anion Gap 16.0 mmol/L     Narrative:       GFR Normal >60  Chronic Kidney Disease <60  Kidney Failure <15      BNP [[id number]]  (Abnormal) Collected:  08/19/20 1614    Specimen:  Blood Updated:  08/19/20 1650     proBNP 8,244.0 pg/mL     Narrative:       Among patients with dyspnea, NT-proBNP is highly sensitive for the detection of acute congestive heart failure. In addition NT-proBNP of <300 pg/ml effectively rules out acute congestive heart failure with 99% negative predictive value.    Results may be falsely decreased if patient taking Biotin.      Protime-INR [[id number]]  (Abnormal) Collected:  08/19/20 1614    Specimen:  Blood from Arm, Left Updated:  08/19/20 1641     Protime 17.9 Seconds      INR 1.51    CBC & Differential [[id number]] Collected:  08/19/20 1614    Specimen:  Blood Updated:  08/19/20 1634    Narrative:       The following orders were created for panel order CBC & Differential.  Procedure                               Abnormality         Status                     ---------                               -----------         ------                     CBC Auto Differential[[id number]]        Abnormal            Final result                 Please view results for these tests on the individual orders.    CBC Auto Differential [[id number]]  (Abnormal) Collected:  08/19/20 1614    Specimen:  Blood Updated:  08/19/20 1634     WBC 22.13 10*3/mm3      RBC 3.01 10*6/mm3      Hemoglobin 9.6 g/dL       Hematocrit 27.7 %      MCV 92.0 fL      MCH 31.9 pg      MCHC 34.7 g/dL      RDW 14.1 %      RDW-SD 47.8 fl      MPV 9.5 fL      Platelets 215 10*3/mm3      Neutrophil % 75.8 %      Lymphocyte % 11.4 %      Monocyte % 11.4 %      Eosinophil % 0.1 %      Basophil % 0.3 %      Immature Grans % 1.0 %      Neutrophils, Absolute 16.75 10*3/mm3      Lymphocytes, Absolute 2.53 10*3/mm3      Monocytes, Absolute 2.52 10*3/mm3      Eosinophils, Absolute 0.03 10*3/mm3      Basophils, Absolute 0.07 10*3/mm3      Immature Grans, Absolute 0.23 10*3/mm3      nRBC 0.0 /100 WBC           Radiology:   Imaging Results (Last 72 Hours)     Procedure Component Value Units Date/Time    XR Chest 1 View [[id number]] Collected:  08/19/20 1840     Updated:  08/19/20 1845    Narrative:       EXAMINATION: XR CHEST 1 VW-. 8/19/2020 6:40 PM CDT     CHEST, ONE VIEW:     HISTORY: Weakness, renal failure     COMPARISON: 7/4/2009 and a 3/20/2007     A single frontal chest radiograph was obtained.     FINDINGS:     COPD and chronic lung changes identified.     There are no acute infiltrates.     The heart is normal in size with changes from median sternotomy. The  pulmonary circulation appropriate, without heart failure.     Bony structures are intact.                                     Impression:       1. COPD/chronic change.  2. No acute cardiopulmonary process.     This report was finalized on 08/19/2020 18:41 by Dr. Rangel Castro MD.          Culture:  No components found for: WOUNDCUL, 3  No components found for: CSFCUL, 3  No components found for: BC, 3  No components found for: URINECUL, 3      Assessment   -AMAYA (pre renal azotemia vs ATN)  -Crohn's disease  -Chronic diarrhea  -Hyponatremia  -Hypokalemia  -Metabolic acidosis  -UTI      Plan:  I will switch to bicarb based IV fluids, suppl potassium. Avoid hypotension and nephrotoxins. Follow up labs. Continue antibiotics.       Thank you for the consult, we appreciate the opportunity to  provide care to your patients.  Feel free to contact me if I can be of any further assistance.      Bolivar Rueda MD  8/20/2020  14:45

## 2020-08-20 NOTE — PLAN OF CARE
Problem: Patient Care Overview  Goal: Plan of Care Review  Outcome: Ongoing (interventions implemented as appropriate)  Flowsheets (Taken 8/20/2020 1061)  Progress: no change  Plan of Care Reviewed With: patient  Outcome Summary: Pt is on a regular diet. He reports he is tolerating his diet and his appetite is good. He has been very weak and losing weight for the last several months d/t chronic diarrhea. His last colonoscopy was consistent with Crohn's Disease. He reports chronic diarrhea since February with at least 2-3 loose BM's daily. He does use anival Boost at home. Will order BID with meals. He was ordered Apriso, 4 capsules daily but he had only been taking 1 capsule daily. He is also ordered questran and florastor daily to help with chronic diarrhea. Pt qualifies for moderate malnutrition. MSA sent to MD for review. Will cont to follow. Advised pt of alternate selections as needed.

## 2020-08-20 NOTE — H&P
"    [facility] Medicine Services  HISTORY AND PHYSICAL    Date of Admission: 8/19/2020  Primary Care Physician: Joe Velasco MD    Subjective     Chief Complaint: Wekaness    History of Present Illness  70-year-old man who presents to the emergency department from weakness.  He states it is worse with ambulation and activity.  He tries to walk but gets tired.  He states that he feels like this is the same as his previous admission for renal failure.  The patient's last admission was on 7/11/2020.  The patient's creatinine is currently 3.25, and previously about 8 days ago was 2.3.  The patient has a white blood cell count today of approximately I did not have this at his last admission.   22,000, the patient states that he is able to make water.  He states he has had chronic diarrhea since February and is worse with eating.  He has had a colonoscopy and was found to have an inflamed colon, and has been following with Dr. Brewster.  His troponin was 0.05.  He was given 1 L of IV fluids in the ED.  His chest x-ray did not show pneumonia.  The patient denies any chest pain, or shortness of breath.  He states\" I have no pain at all.\"        Review of Systems   Weakness  Diarrhea    Otherwise complete ROS reviewed and negative except as mentioned in the HPI.    Past Medical History:   Past Medical History:   Diagnosis Date   • Acid reflux    • Allergic rhinitis    • Chronic mucoid otitis media    • Chronic rhinitis    • Chronic sinusitis    • Coronary artery disease     HEART BYPASS 2004   • Eustachian tube dysfunction    • Heart disease    • Hypertension    • Mixed hearing loss of left ear    • Perianal abscess    • Sensorineural hearing loss    • Tinnitus      Past Surgical History:  Past Surgical History:   Procedure Laterality Date   • COLONOSCOPY N/A 7/2/2020    Procedure: COLONOSCOPY WITH ANESTHESIA;  Surgeon: Adrien Brewster MD;  Location: [facility] ENDOSCOPY;  Service: " Gastroenterology;  Laterality: N/A;  pre op: diarrhea  post op: polyps  PCP: Joe Velasco MD   • CORONARY ARTERY BYPASS GRAFT     • INCISION AND DRAINAGE PERIRECTAL ABSCESS N/A 3/3/2017    Procedure: INCISION AND DRAINAGE OF JEET ANAL ABSCESS;  Surgeon: Lynette Smith MD;  Location: [facility] OR;  Service:    • MYRINGOTOMY W/ TUBES Left 04/17/2017    06/10/2016   • TONSILLECTOMY     • TOTAL HIP ARTHROPLASTY       Social History:  reports that he has quit smoking. He has never used smokeless tobacco. He reports that he drinks about 14.0 standard drinks of alcohol per week. He reports that he does not use drugs.    Family History: family history includes No Known Problems in his father and mother.      Allergies:  Allergies   Allergen Reactions   • Lortab [Hydrocodone-Acetaminophen] Other (See Comments) and Hallucinations     CLOSTROPHOBIC     Medications:  Prior to Admission medications    Medication Sig Start Date End Date Taking? Authorizing Provider   albuterol (PROVENTIL HFA;VENTOLIN HFA) 108 (90 BASE) MCG/ACT inhaler Inhale 2 puffs 4 (Four) Times a Day As Needed for Wheezing or Shortness of Air.    ProviderTwyla MD   allopurinol (ZYLOPRIM) 100 MG tablet Take 1 tablet by mouth Daily. 7/16/20   Maria L Zambrano APRN   amLODIPine (NORVASC) 10 MG tablet TAKE 1 TABLET DAILY 8/4/20   Joe Velasco MD   aspirin (ASPIR) 81 MG EC tablet Take 81 mg by mouth Daily.    ProviderTwyla MD   aspirin-acetaminophen-caffeine (Excedrin Menstrual Complete) 250-250-65 MG per tablet Take 1 tablet by mouth Every 6 (Six) Hours As Needed for Headache.    ProviderTwyla MD   azelastine (ASTELIN) 0.1 % nasal spray 1 spray into the nostril(s) as directed by provider 2 (Two) Times a Day As Needed for Rhinitis.    Twyla Guevara MD   cholecalciferol (VITAMIN D3) 25 MCG (1000 UT) tablet Take 2 tablets by mouth Daily. 7/16/20   Maria L Zambrano APRN   cholestyramine (QUESTRAN) 4 g packet Take 1 packet  "by mouth Daily As Needed (Diarrhea). 7/15/20   Maria L Zambrano APRN   CloNIDine (CATAPRES) 0.2 MG tablet Take 0.2 mg by mouth 3 (Three) Times a Day.    Twyla Guevara MD   desoximetasone (TOPICORT) 0.25 % cream APPLY EXTERNALLY TO THE AFFECTED AREA TWICE DAILY AS DIRECTED 7/20/20   Joe Velasco MD   diphenoxylate-atropine (LOMOTIL) 2.5-0.025 MG per tablet Take 1 tablet by mouth 4 (Four) Times a Day As Needed for Diarrhea.    Twyla Guevara MD   famotidine (PEPCID) 20 MG tablet Take 1 tablet by mouth Daily. 7/16/20   Maria L Zambrano APRN   fexofenadine (ALLEGRA) 180 MG tablet Take 180 mg by mouth Daily.    Twyla Guevara MD   fluticasone (FLONASE) 50 MCG/ACT nasal spray 2 sprays into the nostril(s) as directed by provider As Needed for Rhinitis.    Twyla Guevara MD   furosemide (LASIX) 20 MG tablet TAKE 1 TABLET BY MOUTH DAILY 7/22/20   Joe Velasco MD   magnesium oxide (MAGOX) 400 (241.3 Mg) MG tablet tablet Take 1 tablet by mouth Daily. 7/15/20   Maria L Zambrano APRN   Melatonin 10 MG capsule Take 2 capsules by mouth Daily.    Twyla Guevara MD   mesalamine (APRISO) 0.375 g 24 hr capsule Take 1 capsule by mouth Daily. 7/30/20   Adrien Brewster MD   metoprolol succinate XL (TOPROL-XL) 25 MG 24 hr tablet Take 1 tablet by mouth Daily. 5/18/20   Joe Velasco MD   Multiple Vitamins-Minerals (PRESERVISION/LUTEIN) capsule Take 1 capsule by mouth 2 (two) times a day.    Twyla Guevara MD   omeprazole (priLOSEC) 20 MG capsule Take 20 mg by mouth Daily.    Twyla Guevara MD     Objective     Vital Signs: /62   Pulse 92   Temp 100.3 °F (37.9 °C) (Oral)   Resp 18   Ht 182.9 cm (72\")   Wt 72.6 kg (160 lb)   SpO2 99%   BMI 21.70 kg/m²   Physical Exam   Constitutional:   Thin and frail   HENT:   Head: Normocephalic and atraumatic.   Nose: Nose normal.   Mouth/Throat: Oropharynx is clear and moist.   Eyes: Conjunctivae and EOM are normal.  "   Neck: Normal range of motion. Neck supple.   Cardiovascular: Normal rate, regular rhythm and normal heart sounds.   Pulmonary/Chest: Effort normal and breath sounds normal.   Abdominal: Soft. Bowel sounds are normal.   Musculoskeletal: He exhibits no edema or tenderness.   Neurological: He is alert. No cranial nerve deficit.   Skin: Skin is warm and dry.   Several hematomas, one worse on the left dorsum of the hand   Psychiatric: He has a normal mood and affect. Thought content normal.   Vitals reviewed.           Results Reviewed:  Lab Results (last 24 hours)     Procedure Component Value Units Date/Time    Urinalysis, Microscopic Only - Urine, Clean Catch [[id number]]  (Abnormal) Collected:  08/19/20 1914    Specimen:  Urine, Clean Catch Updated:  08/19/20 1934     RBC, UA 6-12 /HPF      WBC, UA Too Numerous to Count /HPF      Bacteria, UA 1+ /HPF      Squamous Epithelial Cells, UA 0-2 /HPF      Hyaline Casts, UA --     Comment: .         Methodology Manual Light Microscopy    Urine Culture - Urine, Urine, Clean Catch [[id number]] Collected:  08/19/20 1914    Specimen:  Urine, Clean Catch Updated:  08/19/20 1934    Urinalysis With Culture If Indicated - Urine, Clean Catch [[id number]]  (Abnormal) Collected:  08/19/20 1914    Specimen:  Urine, Clean Catch Updated:  08/19/20 1930     Color, UA Yellow     Appearance, UA Turbid     pH, UA 5.5     Specific Gravity, UA 1.008     Glucose, UA Negative     Ketones, UA Negative     Bilirubin, UA Negative     Blood, UA Moderate (2+)     Protein,  mg/dL (2+)     Leuk Esterase, UA Large (3+)     Nitrite, UA Positive     Urobilinogen, UA 0.2 E.U./dL    COVID PRE-OP / PRE-PROCEDURE SCREENING ORDER (NO ISOLATION) - Swab, Nasopharynx [[id number]] Collected:  08/19/20 1855    Specimen:  Swab from Nasopharynx Updated:  08/19/20 1927    Narrative:       The following orders were created for panel order COVID PRE-OP / PRE-PROCEDURE SCREENING ORDER (NO ISOLATION) - Swab,  "Nasopharynx.  Procedure                               Abnormality         Status                     ---------                               -----------         ------                     COVID-19, ABBOTT IN-HOUS...[[id number]]  Normal              Final result                 Please view results for these tests on the individual orders.    COVID-19, ABBOTT IN-HOUSE,NP Swab (NO TRANSPORT MEDIA) 2 HR TAT - Swab, Nasopharynx [[id number]]  (Normal) Collected:  08/19/20 1855    Specimen:  Swab from Nasopharynx Updated:  08/19/20 1927     COVID19 Not Detected    Narrative:       Fact sheet for providers: https://www.fda.gov/media/845768/download     Fact sheet for patients: https://www.fda.gov/media/793264/download    Lactic Acid, Plasma [[id number]]  (Normal) Collected:  08/19/20 1854    Specimen:  Blood Updated:  08/19/20 1919     Lactate 1.2 mmol/L     Blood Culture - Blood, Arm, Right [[id number]] Collected:  08/19/20 1854    Specimen:  Blood from Arm, Right Updated:  08/19/20 1904    Blood Culture - Blood, Arm, Left [[id number]] Collected:  08/19/20 1854    Specimen:  Blood from Arm, Left Updated:  08/19/20 1904    Procalcitonin [[id number]]  (Abnormal) Collected:  08/19/20 1614    Specimen:  Blood Updated:  08/19/20 1837     Procalcitonin 3.27 ng/mL     Narrative:       As a Marker for Sepsis (Non-Neonates):   1. <0.5 ng/mL represents a low risk of severe sepsis and/or septic shock.  1. >2 ng/mL represents a high risk of severe sepsis and/or septic shock.    As a Marker for Lower Respiratory Tract Infections that require antibiotic therapy:  PCT on Admission     Antibiotic Therapy             6-12 Hrs later  > 0.5                Strongly Recommended            >0.25 - <0.5         Recommended  0.1 - 0.25           Discouraged                   Remeasure/reassess PCT  <0.1                 Strongly Discouraged          Remeasure/reassess PCT      As 28 day mortality risk marker: \"Change in Procalcitonin Result\" (> 80 % or " <=80 %) if Day 0 (or Day 1) and Day 4 values are available. Refer to http://www.[facility]-pct-calculator.com/   Change in PCT <=80 %   A decrease of PCT levels below or equal to 80 % defines a positive change in PCT test result representing a higher risk for 28-day all-cause mortality of patients diagnosed with severe sepsis or septic shock.  Change in PCT > 80 %   A decrease of PCT levels of more than 80 % defines a negative change in PCT result representing a lower risk for 28-day all-cause mortality of patients diagnosed with severe sepsis or septic shock.                Results may be falsely decreased if patient taking Biotin.     C-reactive Protein [[id number]]  (Abnormal) Collected:  08/19/20 1614    Specimen:  Blood Updated:  08/19/20 1830     C-Reactive Protein 18.13 mg/dL     [place] Draw [[id number]] Collected:  08/19/20 1614    Specimen:  Blood Updated:  08/19/20 1715    Narrative:       The following orders were created for panel order [place] Draw.  Procedure                               Abnormality         Status                     ---------                               -----------         ------                     Light Blue Top[[id number]]                                   Final result               Green Top (Gel)[[id number]]                                  Final result               Lavender Top[[id number]]                                     Final result               Red Top[[id number]]                                          Final result                 Please view results for these tests on the individual orders.    Green Top (Gel) [[id number]] Collected:  08/19/20 1614    Specimen:  Blood Updated:  08/19/20 1715     Extra Tube Hold for add-ons.     Comment: Auto resulted.       Lavender Top [[id number]] Collected:  08/19/20 1614    Specimen:  Blood Updated:  08/19/20 1715     Extra Tube hold for add-on     Comment: Auto resulted       Red Top [[id number]] Collected:  08/19/20 1614    Specimen:   Blood Updated:  08/19/20 1715     Extra Tube Hold for add-ons.     Comment: Auto resulted.       Light Blue Top [[id number]] Collected:  08/19/20 1614    Specimen:  Blood from Arm, Left Updated:  08/19/20 1715     Extra Tube hold for add-on     Comment: Auto resulted       Troponin [[id number]]  (Abnormal) Collected:  08/19/20 1614    Specimen:  Blood Updated:  08/19/20 1658     Troponin T 0.051 ng/mL     Narrative:       Troponin T Reference Range:  <= 0.03 ng/mL-   Negative for AMI  >0.03 ng/mL-     Abnormal for myocardial necrosis.  Clinicians would have to utilize clinical acumen, EKG, Troponin and serial changes to determine if it is an Acute Myocardial Infarction or myocardial injury due to an underlying chronic condition.       Results may be falsely decreased if patient taking Biotin.      Comprehensive Metabolic Panel [[id number]]  (Abnormal) Collected:  08/19/20 1614    Specimen:  Blood Updated:  08/19/20 1655     Glucose 115 mg/dL      BUN 50 mg/dL      Creatinine 3.26 mg/dL      Sodium 127 mmol/L      Potassium 3.2 mmol/L      Chloride 97 mmol/L      CO2 14.0 mmol/L      Calcium 8.9 mg/dL      Total Protein 8.0 g/dL      Albumin 3.60 g/dL      ALT (SGPT) 16 U/L      AST (SGOT) 15 U/L      Alkaline Phosphatase 144 U/L      Total Bilirubin 0.3 mg/dL      eGFR Non African Amer 19 mL/min/1.73      Globulin 4.4 gm/dL      A/G Ratio 0.8 g/dL      BUN/Creatinine Ratio 15.3     Anion Gap 16.0 mmol/L     Narrative:       GFR Normal >60  Chronic Kidney Disease <60  Kidney Failure <15      BNP [[id number]]  (Abnormal) Collected:  08/19/20 1614    Specimen:  Blood Updated:  08/19/20 1650     proBNP 8,244.0 pg/mL     Narrative:       Among patients with dyspnea, NT-proBNP is highly sensitive for the detection of acute congestive heart failure. In addition NT-proBNP of <300 pg/ml effectively rules out acute congestive heart failure with 99% negative predictive value.    Results may be falsely decreased if patient taking  Biotin.      Protime-INR [[id number]]  (Abnormal) Collected:  08/19/20 1614    Specimen:  Blood from Arm, Left Updated:  08/19/20 1641     Protime 17.9 Seconds      INR 1.51    CBC & Differential [[id number]] Collected:  08/19/20 1614    Specimen:  Blood Updated:  08/19/20 1634    Narrative:       The following orders were created for panel order CBC & Differential.  Procedure                               Abnormality         Status                     ---------                               -----------         ------                     CBC Auto Differential[[id number]]        Abnormal            Final result                 Please view results for these tests on the individual orders.    CBC Auto Differential [[id number]]  (Abnormal) Collected:  08/19/20 1614    Specimen:  Blood Updated:  08/19/20 1634     WBC 22.13 10*3/mm3      RBC 3.01 10*6/mm3      Hemoglobin 9.6 g/dL      Hematocrit 27.7 %      MCV 92.0 fL      MCH 31.9 pg      MCHC 34.7 g/dL      RDW 14.1 %      RDW-SD 47.8 fl      MPV 9.5 fL      Platelets 215 10*3/mm3      Neutrophil % 75.8 %      Lymphocyte % 11.4 %      Monocyte % 11.4 %      Eosinophil % 0.1 %      Basophil % 0.3 %      Immature Grans % 1.0 %      Neutrophils, Absolute 16.75 10*3/mm3      Lymphocytes, Absolute 2.53 10*3/mm3      Monocytes, Absolute 2.52 10*3/mm3      Eosinophils, Absolute 0.03 10*3/mm3      Basophils, Absolute 0.07 10*3/mm3      Immature Grans, Absolute 0.23 10*3/mm3      nRBC 0.0 /100 WBC         Imaging Results (Last 24 Hours)     Procedure Component Value Units Date/Time    XR Chest 1 View [[id number]] Collected:  08/19/20 1840     Updated:  08/19/20 1845    Narrative:       EXAMINATION: XR CHEST 1 VW-. 8/19/2020 6:40 PM CDT     CHEST, ONE VIEW:     HISTORY: Weakness, renal failure     COMPARISON: 7/4/2009 and a 3/20/2007     A single frontal chest radiograph was obtained.     FINDINGS:     COPD and chronic lung changes identified.     There are no acute infiltrates.        The heart is normal in size with changes from median sternotomy. The  pulmonary circulation appropriate, without heart failure.     Bony structures are intact.                                     Impression:       1. COPD/chronic change.  2. No acute cardiopulmonary process.     This report was finalized on 08/19/2020 18:41 by Dr. Rangel Castro MD.        I have personally reviewed and interpreted the radiology studies and ECG obtained at time of admission.     Assessment / Plan     Assessment:   Active Hospital Problems    Diagnosis   • Bacterial urinary infection     Impression:  1.  Acute on chronic renal failure  2.  Sepsis/UTI  3.  Hyponatremia  4.  Hypokalemia   5.  Generalized weakness  6.  Nonspecific anemia, probably related to renal disease  7.  Slightly elevated troponin, probably related to renal disease  8.  Hypertension  9.  Metabolic acidosis  10.  Chronic diarrhea    Plan:   1.  IV fluids  2.  Nephrology consult   3.  Rocephin  4.  Oral potassium  5.  Labs in the morning, to include troponin  6.  Continue Questran for diarrhea, and other home medications  7. PT/OT consult     Code Status: Full     I discussed the patient's findings and my recommendations with the patient and family at bedside    Estimated length of stay 2 to 3 days    Patient seen and examined by me on 8/19/2020    Electronically signed by Shana Irizarry DO, 08/19/20, 20:53.

## 2020-08-20 NOTE — PLAN OF CARE
Pt awake and alert. Denies pain. Pt continues to c/o diarrhea, chronic in nature. Number of BM's has not increased. C/O generalized weakness and malaise. Pt takes Apriso at home, but per GI recommendation, pt needs to take 4 times a day as prescribed. Questran added. Nephrology consulted for Acute on chronic renal failure. Creatine 2.9 today, potassium 3.1. PO potassium replacement given. Continue IVF and IV antibiotics. Tolerating diet. VSS. Continue to monitor.

## 2020-08-20 NOTE — PROGRESS NOTES
Malnutrition Severity Assessment    Patient Name:  Ricardo Hugo  YOB: 1950  MRN: [id number]  Admit Date:  8/19/2020    Patient meets criteria for : Moderate (non-severe) Malnutrition    Comments:  Please attest this note and add to dx list if you agree with this assessment. Thanks.    Malnutrition Severity Assessment  Malnutrition Type: Chronic Disease - Related Malnutrition     Malnutrition Type (last 8 hours)      Malnutrition Severity Assessment     Row Name 08/20/20 1440       Malnutrition Severity Assessment    Malnutrition Type  Chronic Disease - Related Malnutrition    Row Name 08/20/20 1440       Unintentional Weight Loss     Unintentional Weight Loss Findings  Severe    Unintentional Weight Loss   Weight loss greater than 7.5% in three months 8.5% in 3 months    Row Name 08/20/20 1440       Muscle Loss    Loss of Muscle Mass Findings  Moderate    Temple Region  Severe - deep hollowing/scooping, lack of muscle to touch, facial bones well defined    Clavicle Bone Region  Moderate - some protrusion in females, visible in males    Acromion Bone Region  Moderate - acromion may slightly protrude    Row Name 08/20/20 1440       Fat Loss    Subcutaneous Fat Loss Findings  Moderate    Orbital Region   Severe - pronounced hollowness/depression, dark circles, loose saggy skin    Upper Arm Region  Moderate - some fat tissue, not ample    Thoracic & Lumbar Region  Moderate - ribs visible with mild depressions, iliac crest somewhat prominent    Row Name 08/20/20 1440       Fluid Accumulation (Edema)    Fluid Accumulation   Moderate equals 2+ pitting edema    Row Name 08/20/20 1440       Criteria Met (Must meet criteria for severity in at least 2 of these categories: M Wasting, Fat Loss, Fluid, Secondary Signs, Wt. Status, Intake)    Patient meets criteria for   Moderate (non-severe) Malnutrition          Electronically signed by:  Analisa Pena RDN, SAULO  08/20/20 14:44

## 2020-08-20 NOTE — ED PROVIDER NOTES
Subjective   This 70-year-old male patient states that today he has been feeling very weak and has no energy or endurance.  He denies chest pain coughing shortness of breath fever chills.  He has decreased urine output and he feels similar to how he did in July when he was admitted here with acute renal failure.  He denies being out in the heat.  Denies any blood or melena in the stool.          Review of Systems   Constitutional: Positive for fatigue. Negative for chills and fever.   HENT: Negative for congestion, rhinorrhea and sore throat.    Respiratory: Negative for cough, chest tightness and shortness of breath.    Cardiovascular: Negative for chest pain.   Gastrointestinal: Negative for abdominal pain, nausea and vomiting.   Genitourinary: Positive for decreased urine volume and difficulty urinating.   Musculoskeletal: Positive for back pain.   Neurological: Positive for weakness.       Past Medical History:   Diagnosis Date   • Acid reflux    • Allergic rhinitis    • Chronic mucoid otitis media    • Chronic rhinitis    • Chronic sinusitis    • Coronary artery disease     HEART BYPASS 2004   • Eustachian tube dysfunction    • Heart disease    • Hypertension    • Mixed hearing loss of left ear    • Perianal abscess    • Sensorineural hearing loss    • Tinnitus        Allergies   Allergen Reactions   • Lortab [Hydrocodone-Acetaminophen] Other (See Comments) and Hallucinations     CLOSTROPHOBIC       Past Surgical History:   Procedure Laterality Date   • COLONOSCOPY N/A 7/2/2020    Procedure: COLONOSCOPY WITH ANESTHESIA;  Surgeon: Adrien Brewster MD;  Location: [facility] ENDOSCOPY;  Service: Gastroenterology;  Laterality: N/A;  pre op: diarrhea  post op: polyps  PCP: Joe Velasco MD   • CORONARY ARTERY BYPASS GRAFT     • INCISION AND DRAINAGE PERIRECTAL ABSCESS N/A 3/3/2017    Procedure: INCISION AND DRAINAGE OF JEET ANAL ABSCESS;  Surgeon: Lynette Smith MD;  Location: [facility] OR;  Service:    •  MYRINGOTOMY W/ TUBES Left 04/17/2017    06/10/2016   • TONSILLECTOMY     • TOTAL HIP ARTHROPLASTY         Family History   Problem Relation Age of Onset   • No Known Problems Mother    • No Known Problems Father    • Colon cancer Neg Hx    • Colon polyps Neg Hx        Social History     Socioeconomic History   • Marital status:      Spouse name: Not on file   • Number of children: Not on file   • Years of education: Not on file   • Highest education level: Not on file   Tobacco Use   • Smoking status: Former Smoker   • Smokeless tobacco: Never Used   • Tobacco comment: quit 2010   Substance and Sexual Activity   • Alcohol use: Yes     Alcohol/week: 14.0 standard drinks     Types: 14 Cans of beer per week     Frequency: Monthly or less     Comment: occ   • Drug use: No   • Sexual activity: Defer           Objective   Physical Exam   Constitutional: He is oriented to person, place, and time. He appears well-developed and well-nourished. No distress.   HENT:   Head: Normocephalic and atraumatic.   Eyes: Pupils are equal, round, and reactive to light. EOM are normal.   Neck: Normal range of motion. Neck supple.   Cardiovascular: Normal rate, regular rhythm and normal heart sounds.   Pulmonary/Chest: Effort normal and breath sounds normal. He has no wheezes. He has no rales.   Abdominal: Soft. There is no tenderness.   Musculoskeletal: Normal range of motion. He exhibits no edema.   Neurological: He is alert and oriented to person, place, and time.   Skin: Skin is warm and dry.   Nursing note and vitals reviewed.      Procedures           ED Course                                           MDM  Number of Diagnoses or Management Options  Acute renal failure, unspecified acute renal failure type (CMS/HCC):   Bacterial urinary infection:   Diagnosis management comments: Patient had an ultrasound of the kidneys and a CT scan of the abdomen done on his last admission which did not show any problem with kidney stones  or problems of this nature.  Since he is not having pain similar to a kidney stone I did not repeat them this time.  Case was discussed with Dr. Irizarry who agrees to admit the patient.       Amount and/or Complexity of Data Reviewed  Decide to obtain previous medical records or to obtain history from someone other than the patient: yes        Final diagnoses:   Bacterial urinary infection   Acute renal failure, unspecified acute renal failure type (CMS/[facility])            Ricardo Laughlin DO  08/19/20 1959

## 2020-08-20 NOTE — THERAPY DISCHARGE NOTE
Acute Care - Occupational Therapy Initial Eval/Discharge  [facility]     Patient Name: Ricardo Hugo  : 1950  MRN: [id number]  Today's Date: 2020  Onset of Illness/Injury or Date of Surgery: 20  Date of Referral to OT: 20  Referring Physician: Dr. Hammond      Admit Date: 2020       ICD-10-CM ICD-9-CM   1. Bacterial urinary infection N39.0 599.0    A49.9 041.9   2. Acute renal failure, unspecified acute renal failure type (CMS/HCC) N17.9 584.9   3. Decreased activities of daily living (ADL) Z78.9 V49.89     Patient Active Problem List   Diagnosis   • Eustachian tube dysfunction   • Chronic rhinitis   • Asymmetrical sensorineural hearing loss   • History of adenomatous polyp of colon   • Atherosclerosis of native artery of both lower extremities with intermittent claudication (CMS/HCC)   • Accelerated hypertension   • Diarrhea of infectious origin   • Anxiety disorder   • Bilateral leg edema   • Ischemic colitis (CMS/[facility])   • Diarrhea   • AMAYA (acute kidney injury) (CMS/[facility])   • Avascular necrosis of femoral head, left (CMS/HCC)   • Hyponatremia   • Acute cystitis   • Metabolic acidosis, increased anion gap   • Weight loss   • E coli bacteremia   • Iron deficiency anemia   • Hypomagnesemia   • Ventricular tachycardia, nonsustained (CMS/HCC)   • Crohn's disease of large intestine without complication (CMS/HCC)   • 3-vessel CAD   • COPD exacerbation (CMS/HCC)   • Displacement of lumbar intervertebral disc without myelopathy   • ED (erectile dysfunction) of organic origin   • Hyperlipidemia   • Hypertension, benign   • Idiopathic acroosteolysis   • Impaired fasting blood sugar   • PAD (peripheral artery disease) (CMS/HCC)   • Personal history of alcoholism (CMS/HCC)   • Prostatic hypertrophy   • Proteinuria   • Pernicious anemia   • Cystitis   • Hypokalemia     Past Medical History:   Diagnosis Date   • Acid reflux    • Allergic rhinitis    • Chronic mucoid otitis media    • Chronic  rhinitis    • Chronic sinusitis    • Coronary artery disease     HEART BYPASS 2004   • Eustachian tube dysfunction    • Heart disease    • Hypertension    • Mixed hearing loss of left ear    • Perianal abscess    • Sensorineural hearing loss    • Tinnitus      Past Surgical History:   Procedure Laterality Date   • COLONOSCOPY N/A 7/2/2020    Procedure: COLONOSCOPY WITH ANESTHESIA;  Surgeon: Adrien Brewster MD;  Location: [facility] ENDOSCOPY;  Service: Gastroenterology;  Laterality: N/A;  pre op: diarrhea  post op: polyps  PCP: Joe Velasco MD   • CORONARY ARTERY BYPASS GRAFT     • INCISION AND DRAINAGE PERIRECTAL ABSCESS N/A 3/3/2017    Procedure: INCISION AND DRAINAGE OF JEET ANAL ABSCESS;  Surgeon: Lynette Smith MD;  Location: [facility] OR;  Service:    • MYRINGOTOMY W/ TUBES Left 04/17/2017    06/10/2016   • TONSILLECTOMY     • TOTAL HIP ARTHROPLASTY            OT ASSESSMENT FLOWSHEET (last 12 hours)      Occupational Therapy Evaluation     Row Name 08/20/20 1120                   OT Evaluation Time/Intention    Subjective Information  complains of  -JJ        Document Type  evaluation see MAR  -JJ        Mode of Treatment  occupational therapy  -JJ           General Information    Patient Profile Reviewed?  yes  -JJ        Onset of Illness/Injury or Date of Surgery  08/19/20  -JJ        Referring Physician  Dr. Hammond  -JJ        Patient Observations  alert;cooperative;agree to therapy  -JJ        Patient/Family Observations  no family present in room   -JJ        General Observations of Patient  fowlers in bed, IV infusing, alert   -JJ        Prior Level of Function  independent:;all household mobility;community mobility;transfer;bed mobility;ADL's  -JJ        Equipment Currently Used at Home  walker, rolling;shower chair;commode, bedside;cane, straight  -JJ        Pertinent History of Current Functional Problem  Pt presented with weakness. Dx: bacterial urinary infection, ARF.   -JJ        Existing  Precautions/Restrictions  fall  -JJ        Risks Reviewed  patient:;nausea/vomiting;LOB;increased discomfort;dizziness;change in vital signs;increased drainage;lines disloged  -        Benefits Reviewed  patient:;improve function;increase strength;increase independence;decrease pain;increase balance;decrease risk of DVT;improve skin integrity;increase knowledge  -JJ           Relationship/Environment    Lives With  alone  -JJ           Resource/Environmental Concerns    Current Living Arrangements  home/apartment/condo  -        Resource/Environmental Concerns  none  -JJ           Home Main Entrance    Number of Stairs, Main Entrance  two  -J        Stair Railings, Main Entrance  railings on both sides of stairs  -           Stairs Within Home, Primary    Number of Stairs, Within Home, Primary  none  -J           Cognitive Assessment/Interventions    Additional Documentation  Cognitive Assessment/Intervention (Group)  -           Cognitive Assessment/Intervention- PT/OT    Affect/Mental Status (Cognitive)  WFL  -        Orientation Status (Cognition)  oriented x 3  -        Personal Safety Interventions  fall prevention program maintained;gait belt;muscle strengthening facilitated;nonskid shoes/slippers when out of bed;supervised activity  -           Safety Issues, Functional Mobility    Impairments Affecting Function (Mobility)  balance;endurance/activity tolerance  -           Bed Mobility Assessment/Treatment    Bed Mobility Assessment/Treatment  supine-sit  -        Supine-Sit [place] (Bed Mobility)  conditional independence  -        Assistive Device (Bed Mobility)  head of bed elevated  -           Functional Mobility    Functional Mobility- Ind. Level  contact guard assist  -        Functional Mobility- Comment  down hallway, one LOB during mobility   -           Transfer Assessment/Treatment    Transfer Assessment/Treatment  sit-stand transfer;stand-sit transfer  -            Sit-Stand Transfer    Sit-Stand [place] (Transfers)  contact guard  -JJ           Stand-Sit Transfer    Stand-Sit [place] (Transfers)  contact guard  -J           ADL Assessment/Intervention    BADL Assessment/Intervention  toileting  -J           Toileting Assessment/Training    [place] Level (Toileting)  adjust/manage clothing;independent  -J        Assistive Devices (Toileting)  urinal  -        Toileting Position  sitting up in bed  -           BADL Safety/Performance    Impairments, BADL Safety/Performance  balance;endurance/activity tolerance  -        Skilled BADL Treatment/Intervention  BADL process/adaptation training  -           General ROM    GENERAL ROM COMMENTS  BUE AROM WFL   -           MMT (Manual Muscle Testing)    General MMT Comments  BUE strength 4+/5  -           Motor Assessment/Interventions    Additional Documentation  Balance (Group)  -           Balance    Balance  static sitting balance;static standing balance  -           Static Sitting Balance    Level of [place] (Unsupported Sitting, Static Balance)  independent  -        Sitting Position (Unsupported Sitting, Static Balance)  sitting on edge of bed  -           Static Standing Balance    Level of [place] (Supported Standing, Static Balance)  contact guard assist  -           Sensory Assessment/Intervention    Sensory General Assessment  no sensation deficits identified  -           Positioning and Restraints    Pre-Treatment Position  in bed  -J        Post Treatment Position  chair  -        In Chair  sitting;notified nsg;call light within reach;encouraged to call for assist;with other staff  -           Pain Assessment    Additional Documentation  Pain Scale: Numbers Pre/Post-Treatment (Group)  -           Pain Scale: Numbers Pre/Post-Treatment    Pain Scale: Numbers, Pretreatment  0/10 - no pain  -        Pain Intervention(s)  Medication (See  MAR);Repositioned;Ambulation/increased activity  -           Plan of Care Review    Plan of Care Reviewed With  patient  -JJ        Outcome Summary  OT eval completed. Pt is A&Ox4. Demo's decreased balance and activity tolerance/endurance. Mod I for bed mobility. Independently able to complete toileting with urinal. CGA for all sit <> stand t/f from EOB and all functional mobility. Will defer balance and endurance training to PT. OT to sign off. Anticipate d/c home with assist and HH.   -J           Clinical Impression (OT)    Date of Referral to OT  08/19/20  -JJ        OT Diagnosis  decreased adls  -J        Prognosis (OT Eval)  good  -J        Patient/Family Goals Statement (OT Eval)  return home  -J        Criteria for Skilled Therapeutic Interventions Met (OT Eval)  no;treatment indicated  -J        Therapy Frequency (OT Eval)  evaluation only  -J        Care Plan Review (OT)  evaluation/treatment results reviewed;care plan/treatment goals reviewed;risks/benefits reviewed;current/potential barriers reviewed;patient/other agree to care plan  -        Anticipated Discharge Disposition (OT)  home with assist;home with home health  -J           Living Environment    Home Accessibility  stairs to enter home  -          User Key  (r) = Recorded By, (t) = Taken By, (c) = Cosigned By    Initials Name Effective Dates    Ruma Billingsley OTR/L 07/05/20 -           Occupational Therapy Education                 Title: PT OT SLP Therapies (In Progress)     Topic: Occupational Therapy (In Progress)     Point: ADL training (Done)     Description:   Instruct learner(s) on proper safety adaptation and remediation techniques during self care or transfers.   Instruct in proper use of assistive devices.              Learning Progress Summary           Patient Acceptance, E, VU by FAYE at 8/20/2020 1202                   Point: Home exercise program (Not Started)     Description:   Instruct learner(s) on  appropriate technique for monitoring, assisting and/or progressing therapeutic exercises/activities.              Learner Progress:   Not documented in this visit.          Point: Precautions (Not Started)     Description:   Instruct learner(s) on prescribed precautions during self-care and functional transfers.              Learner Progress:   Not documented in this visit.          Point: Body mechanics (Done)     Description:   Instruct learner(s) on proper positioning and spine alignment during self-care, functional mobility activities and/or exercises.              Learning Progress Summary           Patient Acceptance, E, VU by FAYE at 8/20/2020 1202                               User Key     Initials Effective Dates Name Provider Type Discipline    FAYE 07/05/20 -  Ruma Newell, OTR/L Occupational Therapist OT                OT Recommendation and Plan  Outcome Summary/Treatment Plan (OT)  Anticipated Discharge Disposition (OT): home with assist, home with home health  Therapy Frequency (OT Eval): evaluation only  Plan of Care Review  Plan of Care Reviewed With: patient  Plan of Care Reviewed With: patient  Outcome Summary: OT eval completed. Pt is A&Ox4. Demo's decreased balance and activity tolerance/endurance. Mod I for bed mobility. Independently able to complete toileting with urinal. CGA for all sit <> stand t/f from EOB and all functional mobility. Will defer balance and endurance training to PT. OT to sign off. Anticipate d/c home with assist and HH.         Outcome Measures     Row Name 08/20/20 1200             How much help from another is currently needed...    Putting on and taking off regular lower body clothing?  4  -JJ      Bathing (including washing, rinsing, and drying)  3  -JJ      Toileting (which includes using toilet bed pan or urinal)  4  -JJ      Putting on and taking off regular upper body clothing  4  -JJ      Taking care of personal grooming (such as brushing teeth)  4  -JJ       Eating meals  4  -J      AM-PAC 6 Clicks Score (OT)  23  -         Functional Assessment    Outcome Measure Options  AM-PAC 6 Clicks Daily Activity (OT)  -        User Key  (r) = Recorded By, (t) = Taken By, (c) = Cosigned By    Initials Name Provider Type    Ruma Billingsley OTR/L Occupational Therapist          Time Calculation:   Time Calculation- OT     Row Name 08/20/20 1201             Time Calculation- OT    OT Start Time  1120  -      OT Stop Time  1150  -      OT Time Calculation (min)  30 min  -      OT Received On  08/20/20  -        User Key  (r) = Recorded By, (t) = Taken By, (c) = Cosigned By    Initials Name Provider Type    Ruma Billingsley OTR/L Occupational Therapist        Therapy Suggested Charges     Code   Minutes Charges    None           Therapy Charges for Today     Code Description Service Date Service Provider Modifiers Qty    77846030021 HC OT EVAL LOW COMPLEXITY 2 8/20/2020 Ruma Newell OTR/L GO 1               OT Discharge Summary  Anticipated Discharge Disposition (OT): home with assist, home with home health  Reason for Discharge: At baseline function  Outcomes Achieved: Other(eval x1)  Discharge Destination: Home with assist, Home with home health    MIKEY Barron  8/20/2020

## 2020-08-21 PROBLEM — E44.0 MODERATE MALNUTRITION: Status: ACTIVE | Noted: 2020-08-21

## 2020-08-21 LAB
ALBUMIN SERPL-MCNC: 2.7 G/DL (ref 3.5–5.2)
ALBUMIN/GLOB SERPL: 0.7 G/DL
ALP SERPL-CCNC: 136 U/L (ref 39–117)
ALT SERPL W P-5'-P-CCNC: 17 U/L (ref 1–41)
ANION GAP SERPL CALCULATED.3IONS-SCNC: 14 MMOL/L (ref 5–15)
AST SERPL-CCNC: 18 U/L (ref 1–40)
BACTERIA SPEC AEROBE CULT: ABNORMAL
BASOPHILS # BLD AUTO: 0.04 10*3/MM3 (ref 0–0.2)
BASOPHILS NFR BLD AUTO: 0.4 % (ref 0–1.5)
BILIRUB SERPL-MCNC: 0.2 MG/DL (ref 0–1.2)
BUN SERPL-MCNC: 37 MG/DL (ref 8–23)
BUN/CREAT SERPL: 15.2 (ref 7–25)
CALCIUM SPEC-SCNC: 8 MG/DL (ref 8.6–10.5)
CHLORIDE SERPL-SCNC: 104 MMOL/L (ref 98–107)
CO2 SERPL-SCNC: 18 MMOL/L (ref 22–29)
CREAT SERPL-MCNC: 2.43 MG/DL (ref 0.76–1.27)
DEPRECATED RDW RBC AUTO: 46.5 FL (ref 37–54)
EOSINOPHIL # BLD AUTO: 0.17 10*3/MM3 (ref 0–0.4)
EOSINOPHIL NFR BLD AUTO: 1.8 % (ref 0.3–6.2)
ERYTHROCYTE [DISTWIDTH] IN BLOOD BY AUTOMATED COUNT: 14 % (ref 12.3–15.4)
GFR SERPL CREATININE-BSD FRML MDRD: 26 ML/MIN/1.73
GLOBULIN UR ELPH-MCNC: 3.9 GM/DL
GLUCOSE SERPL-MCNC: 107 MG/DL (ref 65–99)
HCT VFR BLD AUTO: 23.9 % (ref 37.5–51)
HGB BLD-MCNC: 8.4 G/DL (ref 13–17.7)
IMM GRANULOCYTES # BLD AUTO: 0.06 10*3/MM3 (ref 0–0.05)
IMM GRANULOCYTES NFR BLD AUTO: 0.6 % (ref 0–0.5)
LYMPHOCYTES # BLD AUTO: 1.32 10*3/MM3 (ref 0.7–3.1)
LYMPHOCYTES NFR BLD AUTO: 14.3 % (ref 19.6–45.3)
MAGNESIUM SERPL-MCNC: 1.6 MG/DL (ref 1.6–2.4)
MCH RBC QN AUTO: 31.9 PG (ref 26.6–33)
MCHC RBC AUTO-ENTMCNC: 35.1 G/DL (ref 31.5–35.7)
MCV RBC AUTO: 90.9 FL (ref 79–97)
MONOCYTES # BLD AUTO: 0.97 10*3/MM3 (ref 0.1–0.9)
MONOCYTES NFR BLD AUTO: 10.5 % (ref 5–12)
NEUTROPHILS NFR BLD AUTO: 6.7 10*3/MM3 (ref 1.7–7)
NEUTROPHILS NFR BLD AUTO: 72.4 % (ref 42.7–76)
NRBC BLD AUTO-RTO: 0 /100 WBC (ref 0–0.2)
PLATELET # BLD AUTO: 134 10*3/MM3 (ref 140–450)
PMV BLD AUTO: 10.4 FL (ref 6–12)
POTASSIUM SERPL-SCNC: 2.7 MMOL/L (ref 3.5–5.2)
PROT SERPL-MCNC: 6.6 G/DL (ref 6–8.5)
RBC # BLD AUTO: 2.63 10*6/MM3 (ref 4.14–5.8)
SODIUM SERPL-SCNC: 136 MMOL/L (ref 136–145)
WBC # BLD AUTO: 9.26 10*3/MM3 (ref 3.4–10.8)

## 2020-08-21 PROCEDURE — 25010000003 POTASSIUM CHLORIDE PER 2 MEQ: Performed by: INTERNAL MEDICINE

## 2020-08-21 PROCEDURE — 80053 COMPREHEN METABOLIC PANEL: CPT | Performed by: NURSE PRACTITIONER

## 2020-08-21 PROCEDURE — 83735 ASSAY OF MAGNESIUM: CPT | Performed by: NURSE PRACTITIONER

## 2020-08-21 PROCEDURE — 97116 GAIT TRAINING THERAPY: CPT

## 2020-08-21 PROCEDURE — 85025 COMPLETE CBC W/AUTO DIFF WBC: CPT | Performed by: NURSE PRACTITIONER

## 2020-08-21 PROCEDURE — 99232 SBSQ HOSP IP/OBS MODERATE 35: CPT | Performed by: NURSE PRACTITIONER

## 2020-08-21 RX ORDER — LEVOFLOXACIN 250 MG/1
250 TABLET ORAL EVERY 24 HOURS
Status: DISCONTINUED | OUTPATIENT
Start: 2020-08-21 | End: 2020-08-22 | Stop reason: HOSPADM

## 2020-08-21 RX ORDER — AMLODIPINE BESYLATE 10 MG/1
10 TABLET ORAL DAILY
COMMUNITY
End: 2021-08-02

## 2020-08-21 RX ORDER — POTASSIUM CHLORIDE 750 MG/1
40 CAPSULE, EXTENDED RELEASE ORAL EVERY 4 HOURS
Status: COMPLETED | OUTPATIENT
Start: 2020-08-21 | End: 2020-08-21

## 2020-08-21 RX ORDER — SODIUM CHLORIDE AND POTASSIUM CHLORIDE 150; 450 MG/100ML; MG/100ML
100 INJECTION, SOLUTION INTRAVENOUS CONTINUOUS
Status: DISCONTINUED | OUTPATIENT
Start: 2020-08-21 | End: 2020-08-22 | Stop reason: HOSPADM

## 2020-08-21 RX ADMIN — LEVOFLOXACIN 250 MG: 250 TABLET, FILM COATED ORAL at 13:33

## 2020-08-21 RX ADMIN — ALLOPURINOL 100 MG: 100 TABLET ORAL at 08:40

## 2020-08-21 RX ADMIN — AMLODIPINE BESYLATE 10 MG: 10 TABLET ORAL at 08:39

## 2020-08-21 RX ADMIN — FAMOTIDINE 10 MG: 20 TABLET, FILM COATED ORAL at 08:41

## 2020-08-21 RX ADMIN — Medication 250 MG: at 21:38

## 2020-08-21 RX ADMIN — POTASSIUM CHLORIDE 40 MEQ: 750 CAPSULE, EXTENDED RELEASE ORAL at 08:46

## 2020-08-21 RX ADMIN — MESALAMINE 1.5 G: 375 CAPSULE, EXTENDED RELEASE ORAL at 08:40

## 2020-08-21 RX ADMIN — Medication 400 MG: at 21:38

## 2020-08-21 RX ADMIN — SODIUM BICARBONATE 650 MG: 650 TABLET ORAL at 16:00

## 2020-08-21 RX ADMIN — CLONIDINE HYDROCHLORIDE 0.2 MG: 0.2 TABLET ORAL at 08:38

## 2020-08-21 RX ADMIN — METOPROLOL SUCCINATE 25 MG: 25 TABLET, EXTENDED RELEASE ORAL at 08:39

## 2020-08-21 RX ADMIN — CLONIDINE HYDROCHLORIDE 0.2 MG: 0.2 TABLET ORAL at 16:00

## 2020-08-21 RX ADMIN — VITAMIN D, TAB 1000IU (100/BT) 2000 UNITS: 25 TAB at 08:39

## 2020-08-21 RX ADMIN — POTASSIUM CHLORIDE AND SODIUM CHLORIDE 100 ML/HR: 450; 150 INJECTION, SOLUTION INTRAVENOUS at 14:53

## 2020-08-21 RX ADMIN — Medication 250 MG: at 08:38

## 2020-08-21 RX ADMIN — Medication 100 MEQ: at 11:38

## 2020-08-21 RX ADMIN — Medication 100 MEQ: at 00:38

## 2020-08-21 RX ADMIN — Medication 400 MG: at 08:40

## 2020-08-21 RX ADMIN — CHOLESTYRAMINE 1 PACKET: 4 POWDER, FOR SUSPENSION ORAL at 08:40

## 2020-08-21 RX ADMIN — CLONIDINE HYDROCHLORIDE 0.2 MG: 0.2 TABLET ORAL at 21:37

## 2020-08-21 RX ADMIN — Medication 1 TABLET: at 08:40

## 2020-08-21 RX ADMIN — POTASSIUM CHLORIDE 40 MEQ: 750 CAPSULE, EXTENDED RELEASE ORAL at 11:43

## 2020-08-21 RX ADMIN — SODIUM BICARBONATE 650 MG: 650 TABLET ORAL at 21:38

## 2020-08-21 RX ADMIN — ASPIRIN 81 MG: 81 TABLET ORAL at 08:40

## 2020-08-21 RX ADMIN — POTASSIUM CHLORIDE 40 MEQ: 750 CAPSULE, EXTENDED RELEASE ORAL at 16:00

## 2020-08-21 RX ADMIN — SODIUM BICARBONATE 650 MG: 650 TABLET ORAL at 08:40

## 2020-08-21 NOTE — PROGRESS NOTES
Discharge Planning Assessment  [facility]     Patient Name: Ricardo Hugo  MRN: [id number]  Today's Date: 8/21/2020    Admit Date: 8/19/2020    Discharge Needs Assessment     Row Name 08/21/20 1431       Living Environment    Lives With  alone    Current Living Arrangements  home/apartment/condo    Provides Primary Care For  no one    Quality of Family Relationships  helpful;involved;supportive    Able to Return to Prior Arrangements  yes       Resource/Environmental Concerns    Resource/Environmental Concerns  none       Transition Planning    Patient/Family Anticipates Transition to  home    Patient/Family Anticipated Services at Transition  none    Transportation Anticipated  family or friend will provide       Discharge Needs Assessment    Readmission Within the Last 30 Days  no previous admission in last 30 days    Concerns to be Addressed  denies needs/concerns at this time    Equipment Currently Used at Home  none    Anticipated Changes Related to Illness  none    Equipment Needed After Discharge  none    Discharge Coordination/Progress  Pt lives at home alone and plans to return home at d/c. He has rx coverage for his medication. He denies needs and does not want home health.        Discharge Plan    No documentation.       Destination      Coordination has not been started for this encounter.      Durable Medical Equipment      Coordination has not been started for this encounter.      Dialysis/Infusion      Coordination has not been started for this encounter.      Home Medical Care      Coordination has not been started for this encounter.      Therapy      Coordination has not been started for this encounter.      Community Resources      Coordination has not been started for this encounter.          Demographic Summary    No documentation.       Functional Status    No documentation.       Psychosocial    No documentation.       Abuse/Neglect    No documentation.       Legal    No documentation.        Substance Abuse    No documentation.       Patient Forms    No documentation.           LINNETTE Ramos

## 2020-08-21 NOTE — PLAN OF CARE
Problem: Patient Care Overview  Goal: Plan of Care Review  Flowsheets (Taken 8/21/2020 1144)  Progress: improving  Plan of Care Reviewed With: patient  Outcome Summary: Pt has no c/o. Bed mobility independent. sit-stand supervision Pt amb 175' no LOB sba. Pt seems strong enough for home when medically stable

## 2020-08-21 NOTE — PROGRESS NOTES
[facility]  Inpatient Progress Note    Reason for Follow Up: Diarrhea    Subjective     Subjective:   Patient is lying in bed this morning.  He is followed by Dr. Barrow on an outpatient basis and carries a history of Crohn's disease.  Patient is denying abdominal pain.  No signs of GI blood loss.  He had 1 occurrence of diarrhea since yesterday.  No nausea no vomiting.  He verbalizes understanding that he will need to take 4 Apriso tablets per day.  States he feels better today compared to yesterday.    Current Facility-Administered Medications:   •  acetaminophen (TYLENOL) tablet 650 mg, 650 mg, Oral, Q4H PRN, Shana Irizarry DO  •  albuterol (PROVENTIL) nebulizer solution 0.083% 2.5 mg/3mL, 2.5 mg, Nebulization, 4x Daily PRN, Shana Irizarry DO  •  allopurinol (ZYLOPRIM) tablet 100 mg, 100 mg, Oral, Daily, Shana Irizarry DO, 100 mg at 08/21/20 0840  •  amLODIPine (NORVASC) tablet 10 mg, 10 mg, Oral, Daily, Shana Irizarry DO, 10 mg at 08/21/20 0839  •  aspirin EC tablet 81 mg, 81 mg, Oral, Daily, Shana Irizarry DO, 81 mg at 08/21/20 0840  •  aspirin-acetaminophen-caffeine (EXCEDRIN MIGRAINE) per tablet 1 tablet, 1 tablet, Oral, Q6H PRN, Shana Irizarry DO  •  azelastine (ASTELIN) nasal spray 1 spray, 1 spray, Each Nare, BID PRN, Shana Irizarry DO  •  cefTRIAXone (ROCEPHIN) 1 g/100 mL 0.9% NS (MBP), 1 g, Intravenous, Q24H, Shana Irizarry DO, 1 g at 08/20/20 2106  •  cholecalciferol (VITAMIN D3) tablet 2,000 Units, 2,000 Units, Oral, Daily, Shana Irizarry DO, 2,000 Units at 08/21/20 0839  •  cholestyramine (QUESTRAN) packet 1 packet, 1 packet, Oral, Daily, Maria L Zambrano, APRN, 1 packet at 08/21/20 0840  •  cloNIDine (CATAPRES) tablet 0.2 mg, 0.2 mg, Oral, TID, Shana Irizarry DO, 0.2 mg at 08/21/20 0838  •  diphenoxylate-atropine (LOMOTIL) 2.5-0.025 MG per tablet 1 tablet, 1 tablet, Oral, 4x Daily PRN, Shana Irizarry DO  •  famotidine  (PEPCID) tablet 10 mg, 10 mg, Oral, Daily, Shana Irizarry DO, 10 mg at 08/21/20 0841  •  magnesium oxide (MAGOX) tablet 400 mg, 400 mg, Oral, BID, Woeltz, Maria L K, APRN, 400 mg at 08/21/20 0840  •  mesalamine (APRISO) 24 hr capsule 1.5 g, 1.5 g, Oral, Daily, Zuleika Schulte APRN, 1.5 g at 08/21/20 0840  •  metoprolol succinate XL (TOPROL-XL) 24 hr tablet 25 mg, 25 mg, Oral, Daily, Shana Irizarry DO, 25 mg at 08/21/20 0839  •  Ocuvite-Lutein (OCUVITE) tablet 1 tablet, 1 tablet, Oral, Daily, Shana Irizarry DO, 1 tablet at 08/21/20 0840  •  ondansetron (ZOFRAN) injection 4 mg, 4 mg, Intravenous, Q6H PRN, Shana Irizarry DO  •  potassium chloride (MICRO-K) CR capsule 40 mEq, 40 mEq, Oral, Q4H, Woeltz, Maria L K, APRN, 40 mEq at 08/21/20 1143  •  saccharomyces boulardii (FLORASTOR) capsule 250 mg, 250 mg, Oral, BID, Woeltz, Maria L K, APRN, 250 mg at 08/21/20 0838  •  sodium bicarbonate 8.4 % 100 mEq in sodium chloride 0.45 % 1,000 mL infusion (greater than 75 mEq), 100 mEq, Intravenous, Continuous, Bolivar Rueda MD, Last Rate: 125 mL/hr at 08/21/20 1138, 100 mEq at 08/21/20 1138  •  sodium bicarbonate tablet 650 mg, 650 mg, Oral, TID, Woeltz, Maria L K, APRN, 650 mg at 08/21/20 0840  •  sodium chloride 0.9 % flush 10 mL, 10 mL, Intravenous, Q12H, Shana Irizarry DO, 10 mL at 08/19/20 2133  •  sodium chloride 0.9 % flush 10 mL, 10 mL, Intravenous, PRN, Juan C, Ali Indira, DO    Review of Systems     Constitution:  negative for chills, fatigue and fevers  Eyes:  negative for blurriness and change of vision  ENT:   negative for sore throat and voice change  Respiratory: negative for  cough and shortness of air  Cardiovascular:  Negative for chest pain or palpitations  Gastrointestinal:  negative for  See HPI  Genitourinary:  negative for  blood in urine and painful urination  Integument: negative for  rash and redness  Hematologic / Lymphatic: negative for  excessive bleeding and  easy bruising  Musculoskeletal: negative for  joint pain and joint stiffness out of the ordinary  Neurological:  negative for  seizures and speech abnormality  Behavioral/Psych:  negative for  anxiety and depression out of the ordinary  Endocrine: negative for  diabetes and weight loss, unintended  Allergies / Immunologic:  negative for  anaphylaxis and rash        Objective     Vital Signs  Temp:  [97.6 °F (36.4 °C)-100.3 °F (37.9 °C)] 98 °F (36.7 °C)  Heart Rate:  [74-84] 77  Resp:  [16-18] 18  BP: (111-159)/(44-63) 142/49  Body mass index is 21.7 kg/m².    Intake/Output Summary (Last 24 hours) at 8/21/2020 1159  Last data filed at 8/21/2020 1138  Gross per 24 hour   Intake 2000 ml   Output 2550 ml   Net -550 ml     I/O this shift:  In: 1000 [I.V.:1000]  Out: 450 [Urine:450]       Physical Exam:   General: patient awake, alert and cooperative   Eyes: Normal lids and lashes, no scleral icterus   Neck: supple, normal ROM   Skin: warm and dry, not jaundiced   Cardiovascular: regular rhythm and rate, no murmurs auscultated   Pulm: clear to auscultation bilaterally, regular and unlabored   Abdomen: soft, nontender, nondistended; normal bowel sounds   Rectal: deferred   Extremities: no rash or edema   Psychiatric: Normal mood and behavior; memory intact         Results Review:    I have reviewed all of the patients current test results  Results from last 7 days   Lab Units 08/21/20 0513 08/20/20  0517 08/19/20  1614   WBC 10*3/mm3 9.26 15.18* 22.13*   HEMOGLOBIN g/dL 8.4* 8.5* 9.6*   HEMATOCRIT % 23.9* 24.4* 27.7*   PLATELETS 10*3/mm3 134* 152 215       Results from last 7 days   Lab Units 08/21/20  0513 08/20/20  0517 08/19/20  1614   SODIUM mmol/L 136 128* 127*   POTASSIUM mmol/L 2.7* 3.1* 3.2*   CHLORIDE mmol/L 104 100 97*   CO2 mmol/L 18.0* 12.0* 14.0*   BUN mg/dL 37* 43* 50*   CREATININE mg/dL 2.43* 2.90* 3.26*   CALCIUM mg/dL 8.0* 8.2* 8.9   BILIRUBIN mg/dL 0.2 0.2 0.3   ALK PHOS U/L 136* 123* 144*   ALT (SGPT)  U/L 17 13 16   AST (SGOT) U/L 18 13 15   GLUCOSE mg/dL 107* 99 115*       Results from last 7 days   Lab Units 08/19/20  1614   INR  1.51*       No results found for: LIPASE    Radiology:    Imaging Results (Last 24 Hours)     ** No results found for the last 24 hours. **            Assessment/Plan     Patient Active Problem List   Diagnosis Code   • Eustachian tube dysfunction H69.80   • Chronic rhinitis J31.0   • Asymmetrical sensorineural hearing loss H90.5   • History of adenomatous polyp of colon Z86.010   • Atherosclerosis of native artery of both lower extremities with intermittent claudication (CMS/[facility]) I70.213   • Accelerated hypertension I10   • Diarrhea of infectious origin A09   • Anxiety disorder F41.9   • Bilateral leg edema R60.0   • Ischemic colitis (CMS/HCC) K55.9   • Diarrhea R19.7   • AMAYA (acute kidney injury) (CMS/HCC) N17.9   • Avascular necrosis of femoral head, left (CMS/HCC) M87.052   • Hyponatremia E87.1   • Acute cystitis N30.00   • Metabolic acidosis, increased anion gap E87.2   • Weight loss R63.4   • E coli bacteremia R78.81, B96.20   • Iron deficiency anemia D50.9   • Hypomagnesemia E83.42   • Ventricular tachycardia, nonsustained (CMS/HCC) I47.2   • Crohn's disease of large intestine without complication (CMS/[facility]) K50.10   • 3-vessel CAD I25.10   • COPD exacerbation (CMS/[facility]) J44.1   • Displacement of lumbar intervertebral disc without myelopathy M51.26   • ED (erectile dysfunction) of organic origin N52.9   • Hyperlipidemia E78.5   • Hypertension, benign I10   • Idiopathic acroosteolysis M89.50   • Impaired fasting blood sugar R73.01   • PAD (peripheral artery disease) (CMS/[facility]) I73.9   • Personal history of alcoholism (CMS/[facility]) F10.21   • Prostatic hypertrophy N40.0   • Proteinuria R80.9   • Pernicious anemia D51.0   • Cystitis N30.90   • Hypokalemia E87.6   • Moderate malnutrition (CMS/[facility]) E44.0       Impression/Plan    Crohn's disease, large intestine  Diarrhea  Weight  loss    Continue current treatment plan.  Patient verbalizes understanding of correct medication dose.  He will need to remain on 4 Apriso tablets per day on an outpatient basis.  He can continue Questran once per day to take prior to largest meal.  If he were to start to feel constipated he will need to hold Questran.  He has appointment with Dr. Brewster on September 29, we did encourage patient to keep this appointment.  If he is to start having difficulty prior to that he is to call the office.        STERLING Terry 8:30 AM      Mode Mendez,   08/21/20  11:59

## 2020-08-21 NOTE — PROGRESS NOTES
" PROGRESS NOTE.      Patient:  Ricardo Hugo  YOB: 1950  Date of Service: 8/21/2020  MRN: [id number]   Acct: [id number]   Primary Care Physician: Joe Velasco MD  Advance Directive:   Code Status and Medical Interventions:   Ordered at: 08/19/20 2053     Level Of Support Discussed With:    Patient     Code Status:    CPR     Medical Interventions (Level of Support Prior to Arrest):    Full     Admit Date: 8/19/2020       Hospital Day: 2  Referring Provider: Shana Irizarry,       Patient Seen, Chart, Consults, Notes, Labs, Radiology studies reviewed.      Subjective:  Ricardo Hugo is a 70 y.o. male  whom we were consulted for acute kidney injury. Patient has Crohn's disease with chronic recurrent diarrhea. He was admitted in July 2020 with a GI picture and was then found with creat at 4.38 (baseline at 1). On 7/22, his creat was 2.6. Patient is readmitted with diarrhea, abdominal pain and was also found with UTI. On admission, his creat was 3.2 and renal service was consulted for AMAYA. Patient noticed some drop in his urine output. He is now managed with IV fluids. He has no dysuria.   Today, he states that he is feeling a bit better but he continues to have diarrhea.       Review of Systems:  History obtained from chart review and the patient  General ROS: No fever or chills  Respiratory ROS: No cough, shortness of breath, wheezing  Cardiovascular ROS: no chest pain or dyspnea on exertion  Gastrointestinal ROS: No abdominal pain or melena  Genito-Urinary ROS: No dysuria or hematuria  Musculoskeletal: negative  Skin: negative    Objective:  /49 (BP Location: Right arm, Patient Position: Lying)   Pulse 77   Temp 98 °F (36.7 °C) (Oral)   Resp 18   Ht 182.9 cm (72\")   Wt 72.6 kg (160 lb)   SpO2 98%   BMI 21.70 kg/m²     Intake/Output Summary (Last 24 hours) at 8/21/2020 1328  Last data filed at 8/21/2020 1138  Gross per 24 hour   Intake 2000 ml   Output 2250 ml   Net -250 ml  "       Physical examination:  General: awake/alert   Chest:  clear to auscultation bilaterally without respiratory distress  CVS: regular rate and rhythm  Abdominal: soft, nontender, normal bowel sounds  Extremities: no cyanosis or edema  Skin: warm and dry without rash  Neuro: No focal motor deficits    Labs:  Lab Results (last 24 hours)     Procedure Component Value Units Date/Time    Magnesium [[id number]]  (Normal) Collected:  08/21/20 0513    Specimen:  Blood Updated:  08/21/20 0622     Magnesium 1.6 mg/dL     Comprehensive Metabolic Panel [[id number]]  (Abnormal) Collected:  08/21/20 0513    Specimen:  Blood Updated:  08/21/20 0606     Glucose 107 mg/dL      BUN 37 mg/dL      Creatinine 2.43 mg/dL      Sodium 136 mmol/L      Potassium 2.7 mmol/L      Chloride 104 mmol/L      CO2 18.0 mmol/L      Calcium 8.0 mg/dL      Total Protein 6.6 g/dL      Albumin 2.70 g/dL      ALT (SGPT) 17 U/L      AST (SGOT) 18 U/L      Alkaline Phosphatase 136 U/L      Total Bilirubin 0.2 mg/dL      eGFR Non African Amer 26 mL/min/1.73      Globulin 3.9 gm/dL      A/G Ratio 0.7 g/dL      BUN/Creatinine Ratio 15.2     Anion Gap 14.0 mmol/L     Narrative:       GFR Normal >60  Chronic Kidney Disease <60  Kidney Failure <15      CBC & Differential [[id number]] Collected:  08/21/20 0513    Specimen:  Blood Updated:  08/21/20 0545    Narrative:       The following orders were created for panel order CBC & Differential.  Procedure                               Abnormality         Status                     ---------                               -----------         ------                     CBC Auto Differential[[id number]]        Abnormal            Final result                 Please view results for these tests on the individual orders.    CBC Auto Differential [[id number]]  (Abnormal) Collected:  08/21/20 0513    Specimen:  Blood Updated:  08/21/20 0545     WBC 9.26 10*3/mm3      RBC 2.63 10*6/mm3      Hemoglobin 8.4 g/dL      Hematocrit  23.9 %      MCV 90.9 fL      MCH 31.9 pg      MCHC 35.1 g/dL      RDW 14.0 %      RDW-SD 46.5 fl      MPV 10.4 fL      Platelets 134 10*3/mm3      Neutrophil % 72.4 %      Lymphocyte % 14.3 %      Monocyte % 10.5 %      Eosinophil % 1.8 %      Basophil % 0.4 %      Immature Grans % 0.6 %      Neutrophils, Absolute 6.70 10*3/mm3      Lymphocytes, Absolute 1.32 10*3/mm3      Monocytes, Absolute 0.97 10*3/mm3      Eosinophils, Absolute 0.17 10*3/mm3      Basophils, Absolute 0.04 10*3/mm3      Immature Grans, Absolute 0.06 10*3/mm3      nRBC 0.0 /100 WBC     Urine Culture - Urine, Urine, Clean Catch [[id number]]  (Abnormal)  (Susceptibility) Collected:  08/19/20 1914    Specimen:  Urine, Clean Catch Updated:  08/21/20 0332     Urine Culture >100,000 CFU/mL Escherichia coli    Susceptibility      Escherichia coli     ASPEN     Ampicillin Resistant     Ampicillin + Sulbactam Resistant     Cefazolin Susceptible     Cefepime Susceptible     Ceftazidime Susceptible     Ceftriaxone Susceptible     Gentamicin Susceptible     Levofloxacin Susceptible     Nitrofurantoin Susceptible     Piperacillin + Tazobactam Susceptible     Tetracycline Susceptible     Trimethoprim + Sulfamethoxazole Susceptible                    Blood Culture - Blood, Arm, Left [[id number]] Collected:  08/19/20 1854    Specimen:  Blood from Arm, Left Updated:  08/20/20 1915     Blood Culture No growth at 24 hours    Blood Culture - Blood, Arm, Right [[id number]] Collected:  08/19/20 1854    Specimen:  Blood from Arm, Right Updated:  08/20/20 1915     Blood Culture No growth at 24 hours          Radiology:   Imaging Results (Last 24 Hours)     ** No results found for the last 24 hours. **              Assessment   -AMAYA (pre renal azotemia )  -Crohn's disease  -Chronic diarrhea  -Hyponatremia  -Hypokalemia  -Metabolic acidosis  -UTI  -hypomagnesemia- better      Plan:  Will switch to saline, provide oral alkali, replace potassium. Follow up labs. Continue  antibiotics.       Bolivar Rueda MD  8/21/2020  13:28

## 2020-08-21 NOTE — PROGRESS NOTES
[facility]  INPATIENT PROGRESS NOTE    Length of Stay: 2  Date of Admission: 8/19/2020  Primary Care Physician: Joe Velasco MD    Subjective   Chief Complaint: Follow-up diarrhea  HPI   Patient resting in bed.  He states he feels about the same today.  He states he has had 4 bowel movements thus far today.  He denies any chest pain or shortness of breath.  He denies abdominal pain, nausea, or vomiting.  He feels his weakness is slightly improved.    Review of Systems   All pertinent negatives and positives are as above. All other systems have been reviewed and are negative unless otherwise stated.     Objective    Temp:  [97.6 °F (36.4 °C)-100.3 °F (37.9 °C)] 98 °F (36.7 °C)  Heart Rate:  [74-84] 77  Resp:  [16-18] 18  BP: (111-159)/(44-63) 142/49  Physical Exam  Constitutional: He is oriented to person, place, and time. He appears well-developed and well-nourished. No distress.   HENT:   Head: Normocephalic and atraumatic.   Neck: Normal range of motion. Neck supple. No JVD present. No tracheal deviation present.   Cardiovascular: Normal rate, regular rhythm and intact distal pulses. Exam reveals no gallop and no friction rub.   Sinus- 73-87   Pulmonary/Chest: Effort normal and breath sounds normal. He has no wheezes. He has no rales.   Abdominal: Soft. Bowel sounds are normal. He exhibits no distension. There is no tenderness. There is no guarding.   Musculoskeletal: Normal range of motion. He exhibits no edema or tenderness.   Neurological: He is alert and oriented to person, place, and time. No cranial nerve deficit.   Skin: Skin is warm and dry. No rash noted. No erythema.   Psychiatric: He has a normal mood and affect. His behavior is normal. Judgment and thought content normal.   Vitals reviewed.    Results Review:  I have reviewed the labs, radiology results, and diagnostic studies.    Laboratory Data:   Results from last 7 days   Lab Units  08/21/20  0513 08/20/20  0517 08/19/20  1614   WBC 10*3/mm3 9.26 15.18* 22.13*   HEMOGLOBIN g/dL 8.4* 8.5* 9.6*   HEMATOCRIT % 23.9* 24.4* 27.7*   PLATELETS 10*3/mm3 134* 152 215     Results from last 7 days   Lab Units 08/21/20  0513 08/20/20  0517 08/19/20  1614   SODIUM mmol/L 136 128* 127*   POTASSIUM mmol/L 2.7* 3.1* 3.2*   CHLORIDE mmol/L 104 100 97*   CO2 mmol/L 18.0* 12.0* 14.0*   BUN mg/dL 37* 43* 50*   CREATININE mg/dL 2.43* 2.90* 3.26*   CALCIUM mg/dL 8.0* 8.2* 8.9   BILIRUBIN mg/dL 0.2 0.2 0.3   ALK PHOS U/L 136* 123* 144*   ALT (SGPT) U/L 17 13 16   AST (SGOT) U/L 18 13 15   GLUCOSE mg/dL 107* 99 115*     I have reviewed the patient current medications.     Assessment/Plan     Active Hospital Problems    Diagnosis   • Moderate malnutrition (CMS/HCC)   • Hypokalemia   • Cystitis   • 3-vessel CAD   • Hyperlipidemia   • PAD (peripheral artery disease) (CMS/HCC)   • Crohn's disease of large intestine without complication (CMS/HCC)     Colonoscopy July 2020 revealed mild patchy scattered hemosiderin staining with inflammation more so in rectosigmoid area.  [facility] lab IBD first step consistent with Crohn's     • Hypomagnesemia   • AMAYA (acute kidney injury) (CMS/HCC)   • Hyponatremia   • Metabolic acidosis, increased anion gap     Plan:  1.  Patient admitted 8/19 with complaints of weakness.  He was recently admitted from 7/11 through 7/15 with an acute kidney injury felt secondary to diarrhea.    2.  At time of discharge on 7/15, the patient's creatinine was 3.1.  On outpatient labs on 7/22 his creatinine was 2.6 and 8/11 was 2.3.  His creatinine on admission last night was 3.2, now down to 2.4.   Appreciate nephrology assistance, and IV fluids changed to include bicarb yesterday.  Decrease IV fluid rate to 75 mL/h and encouraged oral intake.  Continue oral sodium bicarbonate as well, acidosis improving.  3.  Urinalysis positive for nitrites, leukocytes, too numerous to count white blood cells, and 1+  bacteria.  Urine culture shows growth of E. coli.  Will change from Rocephin to oral Levaquin, as this has better urinary penetration.  White blood cell count has normalized.  We will plan to treat for a total of 7 days.  Continue Florastor.  Blood cultures with no growth thus far.  4.  Review of office notes with Dr. Brewster on 7/30.  The patient had a colonoscopy in July 2020 which showed mild patchy scattered hemosiderin staining with inflammation more so in the rectosigmoid area.  The [facility] lab IBD first step was consistent with Crohn's.  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  It was also not felt to be beneficial at this point in time to start an immunosuppressant drug amidst the coronavirus pandemic.  He had a negative GI panel on his last admission, negative C. difficile testing in June.    Gastroenterology note and recommendations reviewed.  Mesalamine increased to 4 tablets-apparently patient was supposed to be taking this on an outpatient basis but was only taking 1 tablet.  Cholestyramine has been scheduled rather than using as needed for now.  5.  Continue efforts at potassium replacement.  Continue magnesium supplementation.  6.  BNP/Troponin likely elevated secondary to renal failure.    Patient has no complaints of chest pain, will monitor closely.  7.  Labs in a.m.    Discharge Planning: I expect the patient to be discharged to home likely tomorrow with continued improvement.    Electronically signed by STERLING Nina, 8/21/2020, 12:45.

## 2020-08-21 NOTE — PLAN OF CARE
Problem: Patient Care Overview  Goal: Plan of Care Review  Outcome: Ongoing (interventions implemented as appropriate)  Flowsheets (Taken 8/21/2020 0248)  Progress: no change  Plan of Care Reviewed With: patient  Note:   Pt denies any pain or nausea. Voiding per urinal. IVF and IV abx. VSS. Will cont to monitor.

## 2020-08-21 NOTE — PLAN OF CARE
Problem: Patient Care Overview  Goal: Plan of Care Review  Outcome: Ongoing (interventions implemented as appropriate)  Flowsheets  Taken 8/21/2020 1341 by Josephine Sorenson LPN  Progress: improving  Outcome Summary: K+= 2.7  ORAL K+ GIVEN PER ORDER. PT. SAYS HE IS GETTING STRONGER.  NO DISTRESS NOTED.  Taken 8/21/2020 1144 by Trinidad Bautista, JULIO  Plan of Care Reviewed With: patient

## 2020-08-22 ENCOUNTER — READMISSION MANAGEMENT (OUTPATIENT)
Dept: CALL CENTER | Facility: HOSPITAL | Age: 70
End: 2020-08-22

## 2020-08-22 VITALS
TEMPERATURE: 98.1 F | SYSTOLIC BLOOD PRESSURE: 143 MMHG | WEIGHT: 160 LBS | BODY MASS INDEX: 21.67 KG/M2 | RESPIRATION RATE: 18 BRPM | DIASTOLIC BLOOD PRESSURE: 58 MMHG | HEIGHT: 72 IN | OXYGEN SATURATION: 100 % | HEART RATE: 62 BPM

## 2020-08-22 LAB
ALBUMIN SERPL-MCNC: 2.7 G/DL (ref 3.5–5.2)
ALBUMIN/GLOB SERPL: 0.7 G/DL
ALP SERPL-CCNC: 168 U/L (ref 39–117)
ALT SERPL W P-5'-P-CCNC: 39 U/L (ref 1–41)
ANION GAP SERPL CALCULATED.3IONS-SCNC: 13 MMOL/L (ref 5–15)
AST SERPL-CCNC: 43 U/L (ref 1–40)
BASOPHILS # BLD AUTO: 0.03 10*3/MM3 (ref 0–0.2)
BASOPHILS NFR BLD AUTO: 0.4 % (ref 0–1.5)
BILIRUB SERPL-MCNC: 0.2 MG/DL (ref 0–1.2)
BUN SERPL-MCNC: 32 MG/DL (ref 8–23)
BUN/CREAT SERPL: 16.4 (ref 7–25)
CALCIUM SPEC-SCNC: 8.4 MG/DL (ref 8.6–10.5)
CHLORIDE SERPL-SCNC: 105 MMOL/L (ref 98–107)
CO2 SERPL-SCNC: 18 MMOL/L (ref 22–29)
CREAT SERPL-MCNC: 1.95 MG/DL (ref 0.76–1.27)
DEPRECATED RDW RBC AUTO: 47 FL (ref 37–54)
EOSINOPHIL # BLD AUTO: 0.27 10*3/MM3 (ref 0–0.4)
EOSINOPHIL NFR BLD AUTO: 3.8 % (ref 0.3–6.2)
ERYTHROCYTE [DISTWIDTH] IN BLOOD BY AUTOMATED COUNT: 13.9 % (ref 12.3–15.4)
GFR SERPL CREATININE-BSD FRML MDRD: 34 ML/MIN/1.73
GLOBULIN UR ELPH-MCNC: 3.8 GM/DL
GLUCOSE SERPL-MCNC: 106 MG/DL (ref 65–99)
HCT VFR BLD AUTO: 25.3 % (ref 37.5–51)
HGB BLD-MCNC: 8.5 G/DL (ref 13–17.7)
IMM GRANULOCYTES # BLD AUTO: 0.04 10*3/MM3 (ref 0–0.05)
IMM GRANULOCYTES NFR BLD AUTO: 0.6 % (ref 0–0.5)
LYMPHOCYTES # BLD AUTO: 1.19 10*3/MM3 (ref 0.7–3.1)
LYMPHOCYTES NFR BLD AUTO: 17 % (ref 19.6–45.3)
MCH RBC QN AUTO: 30.9 PG (ref 26.6–33)
MCHC RBC AUTO-ENTMCNC: 33.6 G/DL (ref 31.5–35.7)
MCV RBC AUTO: 92 FL (ref 79–97)
MONOCYTES # BLD AUTO: 0.76 10*3/MM3 (ref 0.1–0.9)
MONOCYTES NFR BLD AUTO: 10.8 % (ref 5–12)
NEUTROPHILS NFR BLD AUTO: 4.73 10*3/MM3 (ref 1.7–7)
NEUTROPHILS NFR BLD AUTO: 67.4 % (ref 42.7–76)
NRBC BLD AUTO-RTO: 0 /100 WBC (ref 0–0.2)
PLATELET # BLD AUTO: 131 10*3/MM3 (ref 140–450)
PMV BLD AUTO: 10.5 FL (ref 6–12)
POTASSIUM SERPL-SCNC: 3.5 MMOL/L (ref 3.5–5.2)
PROT SERPL-MCNC: 6.5 G/DL (ref 6–8.5)
RBC # BLD AUTO: 2.75 10*6/MM3 (ref 4.14–5.8)
SODIUM SERPL-SCNC: 136 MMOL/L (ref 136–145)
WBC # BLD AUTO: 7.02 10*3/MM3 (ref 3.4–10.8)

## 2020-08-22 PROCEDURE — 25010000003 POTASSIUM CHLORIDE PER 2 MEQ: Performed by: INTERNAL MEDICINE

## 2020-08-22 PROCEDURE — 80053 COMPREHEN METABOLIC PANEL: CPT | Performed by: NURSE PRACTITIONER

## 2020-08-22 PROCEDURE — 85025 COMPLETE CBC W/AUTO DIFF WBC: CPT | Performed by: NURSE PRACTITIONER

## 2020-08-22 PROCEDURE — 99232 SBSQ HOSP IP/OBS MODERATE 35: CPT | Performed by: NURSE PRACTITIONER

## 2020-08-22 RX ORDER — POTASSIUM CHLORIDE 750 MG/1
20 CAPSULE, EXTENDED RELEASE ORAL DAILY
Qty: 60 CAPSULE | Refills: 2 | Status: SHIPPED | OUTPATIENT
Start: 2020-08-22 | End: 2020-09-22 | Stop reason: HOSPADM

## 2020-08-22 RX ORDER — LEVOFLOXACIN 250 MG/1
250 TABLET ORAL EVERY 24 HOURS
Qty: 4 TABLET | Refills: 0 | Status: SHIPPED | OUTPATIENT
Start: 2020-08-22 | End: 2020-08-26

## 2020-08-22 RX ORDER — FUROSEMIDE 20 MG/1
20 TABLET ORAL DAILY PRN
Start: 2020-08-22 | End: 2020-09-22 | Stop reason: HOSPADM

## 2020-08-22 RX ORDER — MESALAMINE 0.38 G/1
1.5 CAPSULE, EXTENDED RELEASE ORAL DAILY
Qty: 120 CAPSULE | Refills: 3 | Status: SHIPPED | OUTPATIENT
Start: 2020-08-22 | End: 2020-12-15 | Stop reason: SDUPTHER

## 2020-08-22 RX ORDER — POTASSIUM CHLORIDE 750 MG/1
20 CAPSULE, EXTENDED RELEASE ORAL DAILY
Status: DISCONTINUED | OUTPATIENT
Start: 2020-08-22 | End: 2020-08-22 | Stop reason: HOSPADM

## 2020-08-22 RX ORDER — SODIUM BICARBONATE 650 MG/1
650 TABLET ORAL 2 TIMES DAILY
Qty: 60 TABLET | Refills: 2 | Status: SHIPPED | OUTPATIENT
Start: 2020-08-22 | End: 2020-12-02 | Stop reason: SDUPTHER

## 2020-08-22 RX ADMIN — Medication 400 MG: at 09:19

## 2020-08-22 RX ADMIN — Medication 1 TABLET: at 09:19

## 2020-08-22 RX ADMIN — POTASSIUM CHLORIDE 20 MEQ: 750 CAPSULE, EXTENDED RELEASE ORAL at 09:19

## 2020-08-22 RX ADMIN — METOPROLOL SUCCINATE 25 MG: 25 TABLET, EXTENDED RELEASE ORAL at 09:20

## 2020-08-22 RX ADMIN — CLONIDINE HYDROCHLORIDE 0.2 MG: 0.2 TABLET ORAL at 09:19

## 2020-08-22 RX ADMIN — POTASSIUM CHLORIDE AND SODIUM CHLORIDE 100 ML/HR: 450; 150 INJECTION, SOLUTION INTRAVENOUS at 01:33

## 2020-08-22 RX ADMIN — SODIUM BICARBONATE 650 MG: 650 TABLET ORAL at 09:19

## 2020-08-22 RX ADMIN — CHOLESTYRAMINE 1 PACKET: 4 POWDER, FOR SUSPENSION ORAL at 09:20

## 2020-08-22 RX ADMIN — VITAMIN D, TAB 1000IU (100/BT) 2000 UNITS: 25 TAB at 09:19

## 2020-08-22 RX ADMIN — ASPIRIN 81 MG: 81 TABLET ORAL at 09:20

## 2020-08-22 RX ADMIN — AMLODIPINE BESYLATE 10 MG: 10 TABLET ORAL at 09:20

## 2020-08-22 RX ADMIN — MESALAMINE 1.5 G: 375 CAPSULE, EXTENDED RELEASE ORAL at 09:20

## 2020-08-22 RX ADMIN — Medication 250 MG: at 09:20

## 2020-08-22 NOTE — DISCHARGE SUMMARY
[facility] Medicine Services  DISCHARGE SUMMARY       Date of Admission: 8/19/2020  Date of Discharge:  8/22/2020  Primary Care Physician: Joe Velasco MD    Presenting Problem/History of Present Illness:  Weakness, diarrhea    Final Discharge Diagnoses:  Active Hospital Problems    Diagnosis   • Moderate malnutrition (CMS/HCC)   • Hypokalemia   • Cystitis   • 3-vessel CAD   • Hyperlipidemia   • PAD (peripheral artery disease) (CMS/HCC)   • Crohn's disease of large intestine without complication (CMS/HCC)     Colonoscopy July 2020 revealed mild patchy scattered hemosiderin staining with inflammation more so in rectosigmoid area.  [facility] lab IBD first step consistent with Crohn's     • Hypomagnesemia   • AMAYA (acute kidney injury) (CMS/HCC)   • Hyponatremia   • Metabolic acidosis, increased anion gap     Consults:   1.  Nephrology  2.  Gastroenterology     Procedures Performed: None    Pertinent Test Results:   Lab Results (all)     Procedure Component Value Units Date/Time    Comprehensive Metabolic Panel [[id number]]  (Abnormal) Collected:  08/22/20 0539    Specimen:  Blood Updated:  08/22/20 0633     Glucose 106 mg/dL      BUN 32 mg/dL      Creatinine 1.95 mg/dL      Sodium 136 mmol/L      Potassium 3.5 mmol/L      Chloride 105 mmol/L      CO2 18.0 mmol/L      Calcium 8.4 mg/dL      Total Protein 6.5 g/dL      Albumin 2.70 g/dL      ALT (SGPT) 39 U/L      AST (SGOT) 43 U/L      Alkaline Phosphatase 168 U/L      Total Bilirubin 0.2 mg/dL      eGFR Non African Amer 34 mL/min/1.73      Globulin 3.8 gm/dL      A/G Ratio 0.7 g/dL      BUN/Creatinine Ratio 16.4     Anion Gap 13.0 mmol/L     CBC Auto Differential [[id number]]  (Abnormal) Collected:  08/22/20 0540    Specimen:  Blood Updated:  08/22/20 0608     WBC 7.02 10*3/mm3      RBC 2.75 10*6/mm3      Hemoglobin 8.5 g/dL      Hematocrit 25.3 %      MCV 92.0 fL      MCH 30.9 pg      MCHC 33.6 g/dL      RDW 13.9 %      RDW-SD  47.0 fl      MPV 10.5 fL      Platelets 131 10*3/mm3      Neutrophil % 67.4 %      Lymphocyte % 17.0 %      Monocyte % 10.8 %      Eosinophil % 3.8 %      Basophil % 0.4 %      Immature Grans % 0.6 %      Neutrophils, Absolute 4.73 10*3/mm3      Lymphocytes, Absolute 1.19 10*3/mm3      Monocytes, Absolute 0.76 10*3/mm3      Eosinophils, Absolute 0.27 10*3/mm3      Basophils, Absolute 0.03 10*3/mm3      Immature Grans, Absolute 0.04 10*3/mm3      nRBC 0.0 /100 WBC     Blood Culture - Blood, Arm, Left [[id number]] Collected:  08/19/20 1854    Specimen:  Blood from Arm, Left Updated:  08/21/20 1915     Blood Culture No growth at 2 days    Blood Culture - Blood, Arm, Right [[id number]] Collected:  08/19/20 1854    Specimen:  Blood from Arm, Right Updated:  08/21/20 1915     Blood Culture No growth at 2 days    Magnesium [[id number]]  (Normal) Collected:  08/21/20 0513    Specimen:  Blood Updated:  08/21/20 0622     Magnesium 1.6 mg/dL     Comprehensive Metabolic Panel [[id number]]  (Abnormal) Collected:  08/21/20 0513    Specimen:  Blood Updated:  08/21/20 0606     Glucose 107 mg/dL      BUN 37 mg/dL      Creatinine 2.43 mg/dL      Sodium 136 mmol/L      Potassium 2.7 mmol/L      Chloride 104 mmol/L      CO2 18.0 mmol/L      Calcium 8.0 mg/dL      Total Protein 6.6 g/dL      Albumin 2.70 g/dL      ALT (SGPT) 17 U/L      AST (SGOT) 18 U/L      Alkaline Phosphatase 136 U/L      Total Bilirubin 0.2 mg/dL      eGFR Non African Amer 26 mL/min/1.73      Globulin 3.9 gm/dL      A/G Ratio 0.7 g/dL      BUN/Creatinine Ratio 15.2     Anion Gap 14.0 mmol/L     CBC Auto Differential [[id number]]  (Abnormal) Collected:  08/21/20 0513    Specimen:  Blood Updated:  08/21/20 0545     WBC 9.26 10*3/mm3      RBC 2.63 10*6/mm3      Hemoglobin 8.4 g/dL      Hematocrit 23.9 %      MCV 90.9 fL      MCH 31.9 pg      MCHC 35.1 g/dL      RDW 14.0 %      RDW-SD 46.5 fl      MPV 10.4 fL      Platelets 134 10*3/mm3      Neutrophil % 72.4 %       Lymphocyte % 14.3 %      Monocyte % 10.5 %      Eosinophil % 1.8 %      Basophil % 0.4 %      Immature Grans % 0.6 %      Neutrophils, Absolute 6.70 10*3/mm3      Lymphocytes, Absolute 1.32 10*3/mm3      Monocytes, Absolute 0.97 10*3/mm3      Eosinophils, Absolute 0.17 10*3/mm3      Basophils, Absolute 0.04 10*3/mm3      Immature Grans, Absolute 0.06 10*3/mm3      nRBC 0.0 /100 WBC     Urine Culture - Urine, Urine, Clean Catch [[id number]]  (Abnormal)  (Susceptibility) Collected:  08/19/20 1914    Specimen:  Urine, Clean Catch Updated:  08/21/20 0332     Urine Culture >100,000 CFU/mL Escherichia coli    Susceptibility      Escherichia coli     ASPEN     Ampicillin Resistant     Ampicillin + Sulbactam Resistant     Cefazolin Susceptible     Cefepime Susceptible     Ceftazidime Susceptible     Ceftriaxone Susceptible     Gentamicin Susceptible     Levofloxacin Susceptible     Nitrofurantoin Susceptible     Piperacillin + Tazobactam Susceptible     Tetracycline Susceptible     Trimethoprim + Sulfamethoxazole Susceptible                    Magnesium [[id number]]  (Abnormal) Collected:  08/20/20 0517    Specimen:  Blood Updated:  08/20/20 0612     Magnesium 0.9 mg/dL     Comprehensive Metabolic Panel [[id number]]  (Abnormal) Collected:  08/20/20 0517    Specimen:  Blood Updated:  08/20/20 0604     Glucose 99 mg/dL      BUN 43 mg/dL      Creatinine 2.90 mg/dL      Sodium 128 mmol/L      Potassium 3.1 mmol/L      Comment: Specimen hemolyzed.  Results may be affected.        Chloride 100 mmol/L      CO2 12.0 mmol/L      Calcium 8.2 mg/dL      Total Protein 6.8 g/dL      Albumin 2.90 g/dL      ALT (SGPT) 13 U/L      AST (SGOT) 13 U/L      Alkaline Phosphatase 123 U/L      Total Bilirubin 0.2 mg/dL      eGFR Non African Amer 21 mL/min/1.73      Globulin 3.9 gm/dL      A/G Ratio 0.7 g/dL      BUN/Creatinine Ratio 14.8     Anion Gap 16.0 mmol/L     Phosphorus [[id number]]  (Normal) Collected:  08/20/20 0517    Specimen:  Blood  Updated:  08/20/20 0604     Phosphorus 3.4 mg/dL     CBC Auto Differential [[id number]]  (Abnormal) Collected:  08/20/20 0517    Specimen:  Blood Updated:  08/20/20 0544     WBC 15.18 10*3/mm3      RBC 2.64 10*6/mm3      Hemoglobin 8.5 g/dL      Hematocrit 24.4 %      MCV 92.4 fL      MCH 32.2 pg      MCHC 34.8 g/dL      RDW 14.2 %      RDW-SD 48.2 fl      MPV 9.9 fL      Platelets 152 10*3/mm3      Neutrophil % 75.9 %      Lymphocyte % 12.1 %      Monocyte % 10.7 %      Eosinophil % 0.3 %      Basophil % 0.3 %      Immature Grans % 0.7 %      Neutrophils, Absolute 11.52 10*3/mm3      Lymphocytes, Absolute 1.83 10*3/mm3      Monocytes, Absolute 1.63 10*3/mm3      Eosinophils, Absolute 0.04 10*3/mm3      Basophils, Absolute 0.05 10*3/mm3      Immature Grans, Absolute 0.11 10*3/mm3      nRBC 0.0 /100 WBC     Urinalysis, Microscopic Only - Urine, Clean Catch [[id number]]  (Abnormal) Collected:  08/19/20 1914    Specimen:  Urine, Clean Catch Updated:  08/19/20 1934     RBC, UA 6-12 /HPF      WBC, UA Too Numerous to Count /HPF      Bacteria, UA 1+ /HPF      Squamous Epithelial Cells, UA 0-2 /HPF      Hyaline Casts, UA --     Comment: .         Methodology Manual Light Microscopy    Urinalysis With Culture If Indicated - Urine, Clean Catch [[id number]]  (Abnormal) Collected:  08/19/20 1914    Specimen:  Urine, Clean Catch Updated:  08/19/20 1930     Color, UA Yellow     Appearance, UA Turbid     pH, UA 5.5     Specific Gravity, UA 1.008     Glucose, UA Negative     Ketones, UA Negative     Bilirubin, UA Negative     Blood, UA Moderate (2+)     Protein,  mg/dL (2+)     Leuk Esterase, UA Large (3+)     Nitrite, UA Positive     Urobilinogen, UA 0.2 E.U./dL    COVID-19, [facility] IN-HOUSE,NP Swab (NO TRANSPORT MEDIA) 2 HR TAT - Swab, Nasopharynx [[id number]]  (Normal) Collected:  08/19/20 1855    Specimen:  Swab from Nasopharynx Updated:  08/19/20 1927     COVID19 Not Detected    Lactic Acid, Plasma [[id number]]  (Normal)  Collected:  08/19/20 1854    Specimen:  Blood Updated:  08/19/20 1919     Lactate 1.2 mmol/L     Procalcitonin [[id number]]  (Abnormal) Collected:  08/19/20 1614    Specimen:  Blood Updated:  08/19/20 1837     Procalcitonin 3.27 ng/mL     Narrative:       C-reactive Protein [[id number]]  (Abnormal) Collected:  08/19/20 1614    Specimen:  Blood Updated:  08/19/20 1830     C-Reactive Protein 18.13 mg/dL     Troponin [[id number]]  (Abnormal) Collected:  08/19/20 1614    Specimen:  Blood Updated:  08/19/20 1658     Troponin T 0.051 ng/mL     Comprehensive Metabolic Panel [[id number]]  (Abnormal) Collected:  08/19/20 1614    Specimen:  Blood Updated:  08/19/20 1655     Glucose 115 mg/dL      BUN 50 mg/dL      Creatinine 3.26 mg/dL      Sodium 127 mmol/L      Potassium 3.2 mmol/L      Chloride 97 mmol/L      CO2 14.0 mmol/L      Calcium 8.9 mg/dL      Total Protein 8.0 g/dL      Albumin 3.60 g/dL      ALT (SGPT) 16 U/L      AST (SGOT) 15 U/L      Alkaline Phosphatase 144 U/L      Total Bilirubin 0.3 mg/dL      eGFR Non African Amer 19 mL/min/1.73      Globulin 4.4 gm/dL      A/G Ratio 0.8 g/dL      BUN/Creatinine Ratio 15.3     Anion Gap 16.0 mmol/L     BNP [[id number]]  (Abnormal) Collected:  08/19/20 1614    Specimen:  Blood Updated:  08/19/20 1650     proBNP 8,244.0 pg/mL     Protime-INR [[id number]]  (Abnormal) Collected:  08/19/20 1614    Specimen:  Blood from Arm, Left Updated:  08/19/20 1641     Protime 17.9 Seconds      INR 1.51    CBC Auto Differential [[id number]]  (Abnormal) Collected:  08/19/20 1614    Specimen:  Blood Updated:  08/19/20 1634     WBC 22.13 10*3/mm3      RBC 3.01 10*6/mm3      Hemoglobin 9.6 g/dL      Hematocrit 27.7 %      MCV 92.0 fL      MCH 31.9 pg      MCHC 34.7 g/dL      RDW 14.1 %      RDW-SD 47.8 fl      MPV 9.5 fL      Platelets 215 10*3/mm3      Neutrophil % 75.8 %      Lymphocyte % 11.4 %      Monocyte % 11.4 %      Eosinophil % 0.1 %      Basophil % 0.3 %      Immature Grans % 1.0 %       Neutrophils, Absolute 16.75 10*3/mm3      Lymphocytes, Absolute 2.53 10*3/mm3      Monocytes, Absolute 2.52 10*3/mm3      Eosinophils, Absolute 0.03 10*3/mm3      Basophils, Absolute 0.07 10*3/mm3      Immature Grans, Absolute 0.23 10*3/mm3      nRBC 0.0 /100 WBC         Imaging Results (All)     Procedure Component Value Units Date/Time    XR Chest 1 View [[id number]] Collected:  08/19/20 1840     Updated:  08/19/20 1845    Narrative:       EXAMINATION: XR CHEST 1 VW-. 8/19/2020 6:40 PM CDT     CHEST, ONE VIEW:     HISTORY: Weakness, renal failure     COMPARISON: 7/4/2009 and a 3/20/2007     A single frontal chest radiograph was obtained.     FINDINGS:     COPD and chronic lung changes identified.     There are no acute infiltrates.     The heart is normal in size with changes from median sternotomy. The  pulmonary circulation appropriate, without heart failure.     Bony structures are intact.                                     Impression:       1. COPD/chronic change.  2. No acute cardiopulmonary process.     This report was finalized on 08/19/2020 18:41 by Dr. Rangel Castro MD.        History of Present Illness on Day of Discharge: Patient states he is feeling much better overall in comparison to admission.  He is eager for discharge home.    Hospital Course:  Mr. Hugo is a pleasant 70-year-old  male who follows Dr. Joe Velasco for primary care.  He has a medical history significant for hypertension, coronary artery disease status post coronary artery bypass grafting, and GERD.   The patient was recently admitted to our facility from 7/11 through 7/15 with an acute kidney injury.  He was also noted to have E. coli urinary tract infection/bacteremia at that point in time.  The patient presented to our emergency department once again on 8/19/2020 with a chief complaint of weakness and ongoing diarrhea.  In the emergency department, the patient's creatinine was 3.26.  This was last noted to be 2.3 on  8/11.  Patient's sodium level is low at 127, potassium low at 3.2.  Procalcitonin was elevated, and the urinalysis was felt to be indicative of infection.  BNP and troponin values were elevated, felt secondary to renal failure.  Patient has no complaints of chest pain or shortness of breath.  The patient was admitted to the hospitalist service for further evaluation and management.    The patient has been given IV fluid hydration, with adjustments to IV fluid composition per nephrology.  His renal function is much improved, with creatinine of 1.95 today.  His metabolic acidosis is improved as well.  Electrolytes have had to be replenished numerous times.  His potassium level is 3.5 today.    The patient's urine culture showed growth of E. coli.  He was placed on Rocephin on admission, and transitioned to oral Levaquin.  He will be treated for a total of 7 days.  His white blood cell count has normalized.  His blood cultures have shown no growth thus far.    Review of office notes with Dr. Brewster on 7/30.  The patient had a colonoscopy in July 2020 which showed mild patchy scattered hemosiderin staining with inflammation more so in the rectosigmoid area.  The [facility] lab IBD first step was consistent with Crohn's.  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  It was also not felt to be beneficial at this point in time to start an immunosuppressant drug amidst the coronavirus pandemic.  He had a negative GI panel on his last admission, negative C. difficile testing in June.   Gastroenterology was consulted during this admission due to ongoing diarrhea.  The patient had previously been ordered mesalamine at his previous outpatient appointment and was only taking 1 tablet and apparently was ordered to be taking 4 tablets.  He had been using cholestyramine on an as-needed basis, which has now been  "scheduled daily.  Patient is still having diarrhea, frequency is felt to be somewhat decreased.  He has a follow-up appointment with gastroenterology in 1 month.    Overall, the patient is hemodynamically stable and appropriate for discharge home today.  He denies the need for any home health services.  He will follow-up with his primary care provider in 1 week and with nephrology in 1 to 2 weeks.  We will arrange for outpatient labs to be checked next week.    Condition on Discharge:  Medically stable    Physical Exam on Discharge:  /58 (BP Location: Left arm, Patient Position: Lying)   Pulse 62   Temp 98.1 °F (36.7 °C) (Oral)   Resp 18   Ht 182.9 cm (72\")   Wt 72.6 kg (160 lb)   SpO2 100%   BMI 21.70 kg/m²   Physical Exam  Constitutional: He is oriented to person, place, and time. He appears well-developed and well-nourished. No distress.   HENT:   Head: Normocephalic and atraumatic.   Neck: Normal range of motion. Neck supple. No JVD present. No tracheal deviation present.   Cardiovascular: Normal rate, regular rhythm and intact distal pulses. Exam reveals no gallop and no friction rub.   Sinus- sinus arrhythmia 63-72  Pulmonary/Chest: Effort normal and breath sounds normal. He has no wheezes. He has no rales.   Abdominal: Soft. Bowel sounds are normal. He exhibits no distension. There is no tenderness. There is no guarding.   Musculoskeletal: Normal range of motion. He exhibits no edema or tenderness.   Neurological: He is alert and oriented to person, place, and time. No cranial nerve deficit.   Skin: Skin is warm and dry. No rash noted. No erythema.   Psychiatric: He has a normal mood and affect. His behavior is normal. Judgment and thought content normal.   Vitals reviewed.    Discharge Disposition:  Home or Self Care    Discharge Medications:     Discharge Medications      New Medications      Instructions Start Date   levoFLOXacin 250 MG tablet  Commonly known as:  LEVAQUIN   250 mg, Oral, " Every 24 Hours      potassium chloride 10 MEQ CR capsule  Commonly known as:  MICRO-K   20 mEq, Oral, Daily      sodium bicarbonate 650 MG tablet   650 mg, Oral, 2 Times Daily         Changes to Medications      Instructions Start Date   cholestyramine 4 g packet  Commonly known as:  QUESTRAN  What changed:    · when to take this  · reasons to take this   1 packet, Oral, Daily      desoximetasone 0.25 % cream  Commonly known as:  TOPICORT  What changed:  See the new instructions.   APPLY EXTERNALLY TO THE AFFECTED AREA TWICE DAILY AS DIRECTED      magnesium oxide 400 (241.3 Mg) MG tablet tablet  Commonly known as:  MAGOX  What changed:  when to take this   400 mg, Oral, 2 Times Daily      mesalamine 0.375 g 24 hr capsule  Commonly known as:  APRISO  What changed:  how much to take   1.5 g, Oral, Daily         Continue These Medications      Instructions Start Date   albuterol sulfate  (90 Base) MCG/ACT inhaler  Commonly known as:  PROVENTIL HFA;VENTOLIN HFA;PROAIR HFA   2 puffs, Inhalation, 4 Times Daily PRN      amLODIPine 10 MG tablet  Commonly known as:  NORVASC   10 mg, Oral, Daily      ASPIR 81 MG EC tablet  Generic drug:  aspirin   81 mg, Oral, Daily      azelastine 0.1 % nasal spray  Commonly known as:  ASTELIN   1 spray, Nasal, 2 Times Daily PRN      cloNIDine 0.2 MG tablet  Commonly known as:  CATAPRES   0.2 mg, Oral, 3 Times Daily      Excedrin Menstrual Complete 250-250-65 MG per tablet  Generic drug:  aspirin-acetaminophen-caffeine   1 tablet, Oral, Every 6 Hours PRN      fexofenadine 180 MG tablet  Commonly known as:  ALLEGRA   180 mg, Oral, Daily      fluticasone 50 MCG/ACT nasal spray  Commonly known as:  FLONASE   2 sprays, Nasal, As Needed      furosemide 20 MG tablet  Commonly known as:  LASIX   20 mg, Oral, Daily PRN      Melatonin 10 MG capsule   2 capsules, Oral, Nightly      metoprolol succinate XL 25 MG 24 hr tablet  Commonly known as:  TOPROL-XL   25 mg, Oral, Daily      omeprazole 20  MG capsule  Commonly known as:  priLOSEC   20 mg, Oral, Daily      PreserVision/Lutein capsule   1 capsule, Oral, 2 times daily           Discharge Diet:   Diet Instructions     Diet: Regular, Renal; Thin      Discharge Diet:   Regular  Renal       Fluid Consistency:  Thin        Activity at Discharge:   Activity Instructions     Activity as Tolerated          Discharge Care Plan/Instructions:   1.  Return for any acute or worsening symptoms  2.  Continue Levaquin to total 7 days of antibiotic therapy for E. coli urinary tract infection.  3.  Increase magnesium supplementation.  Potassium supplementation added.  4.  Mesalamine dosage increased.  Cholestyramine scheduled daily.  5.  Outpatient CMP next week    Follow-up Appointments:   1.  Primary care provider in 1 week  2.  Nephrology in 1 to 2 weeks  Future Appointments   Date Time Provider Department Center   9/8/2020  9:45 AM Joe Velasco MD [facility]   9/29/2020  2:15 PM Adrien Brewster MD [facility] None   12/17/2020  2:45 PM Joe Velasco MD [facility]     Test Results Pending at Discharge: We will follow blood cultures to completion.  No growth today.    Electronically signed by STERLING Nina, 8/22/2020, 09:35.    Time: 40 minutes

## 2020-08-22 NOTE — PROGRESS NOTES
[facility]  Inpatient Progress Note    Reason for Follow Up: Diarrhea/Crohn's    Subjective     Subjective:   Patient lying in bed.  No family at bedside.  He has experienced 3 diarrhea bowel movements yesterday and one this morning.  Continues to deny observation of blood.  No abdominal pain.  Tolerating regular diet without difficulty.  He feels better compared to admission.    Current Facility-Administered Medications:   •  acetaminophen (TYLENOL) tablet 650 mg, 650 mg, Oral, Q4H PRN, Shana Irizarry DO  •  albuterol (PROVENTIL) nebulizer solution 0.083% 2.5 mg/3mL, 2.5 mg, Nebulization, 4x Daily PRN, Shana Irizarry DO  •  amLODIPine (NORVASC) tablet 10 mg, 10 mg, Oral, Daily, Shana Irizarry DO, 10 mg at 08/22/20 0920  •  aspirin EC tablet 81 mg, 81 mg, Oral, Daily, Shana Irizarry DO, 81 mg at 08/22/20 0920  •  aspirin-acetaminophen-caffeine (EXCEDRIN MIGRAINE) per tablet 1 tablet, 1 tablet, Oral, Q6H PRN, Shana Irizarry DO  •  azelastine (ASTELIN) nasal spray 1 spray, 1 spray, Each Nare, BID PRN, Shana Irizarry DO  •  cholecalciferol (VITAMIN D3) tablet 2,000 Units, 2,000 Units, Oral, Daily, Shana Irizarry DO, 2,000 Units at 08/22/20 0919  •  cholestyramine (QUESTRAN) packet 1 packet, 1 packet, Oral, Daily, Maria L Zambrano APRN, 1 packet at 08/22/20 0920  •  cloNIDine (CATAPRES) tablet 0.2 mg, 0.2 mg, Oral, TID, Shana Irizarry DO, 0.2 mg at 08/22/20 0919  •  diphenoxylate-atropine (LOMOTIL) 2.5-0.025 MG per tablet 1 tablet, 1 tablet, Oral, 4x Daily PRN, Shana Irizarry DO  •  levoFLOXacin (LEVAQUIN) tablet 250 mg, 250 mg, Oral, Q24H, Maria L Zambrano APRN, 250 mg at 08/21/20 1333  •  magnesium oxide (MAGOX) tablet 400 mg, 400 mg, Oral, BID, Maria L Zambrano APRN, 400 mg at 08/22/20 0919  •  mesalamine (APRISO) 24 hr capsule 1.5 g, 1.5 g, Oral, Daily, Zuleika Schulte APRN, 1.5 g at 08/22/20 0920  •  metoprolol succinate XL (TOPROL-XL)  24 hr tablet 25 mg, 25 mg, Oral, Daily, Shana Irizarry DO, 25 mg at 08/22/20 0920  •  Ocuvite-Lutein (OCUVITE) tablet 1 tablet, 1 tablet, Oral, Daily, Shana Irizarry DO, 1 tablet at 08/22/20 0919  •  ondansetron (ZOFRAN) injection 4 mg, 4 mg, Intravenous, Q6H PRN, Shana Irizarry DO  •  potassium chloride (MICRO-K) CR capsule 20 mEq, 20 mEq, Oral, Daily, Woeltz, Maria L K, APRN, 20 mEq at 08/22/20 0919  •  saccharomyces boulardii (FLORASTOR) capsule 250 mg, 250 mg, Oral, BID, Woeltz, Maria L K, APRN, 250 mg at 08/22/20 0920  •  sodium bicarbonate tablet 650 mg, 650 mg, Oral, TID, Woeltz, Maria L K, APRN, 650 mg at 08/22/20 0919  •  sodium chloride 0.45 % with KCl 20 mEq/L infusion, 100 mL/hr, Intravenous, Continuous, Bolivar Rueda MD, Last Rate: 100 mL/hr at 08/22/20 0133, 100 mL/hr at 08/22/20 0133  •  sodium chloride 0.9 % flush 10 mL, 10 mL, Intravenous, Q12H, Shana Irizarry DO, 10 mL at 08/19/20 2133  •  sodium chloride 0.9 % flush 10 mL, 10 mL, Intravenous, PRN, Shana Irizarry DO    Review of Systems     Constitution:  negative for chills, fatigue and fevers  Eyes:  negative for blurriness and change of vision  ENT:   negative for sore throat and voice change  Respiratory: negative for  cough and shortness of air  Cardiovascular:  Negative for chest pain or palpitations  Gastrointestinal:  negative for  See HPI  Genitourinary:  negative for  blood in urine and painful urination  Integument: negative for  rash and redness  Hematologic / Lymphatic: negative for  excessive bleeding and easy bruising  Musculoskeletal: negative for  joint pain and joint stiffness out of the ordinary  Neurological:  negative for  seizures and speech abnormality  Behavioral/Psych:  negative for  anxiety and depression out of the ordinary  Endocrine: negative for  diabetes and weight loss, unintended  Allergies / Immunologic:  negative for  anaphylaxis and rash        Objective     Vital  Signs  Temp:  [98 °F (36.7 °C)-99.6 °F (37.6 °C)] 98.1 °F (36.7 °C)  Heart Rate:  [61-72] 62  Resp:  [16-18] 18  BP: (108-144)/(41-58) 143/58  Body mass index is 21.7 kg/m².    Intake/Output Summary (Last 24 hours) at 8/22/2020 0949  Last data filed at 8/22/2020 0545  Gross per 24 hour   Intake 2064 ml   Output 2425 ml   Net -361 ml     No intake/output data recorded.       Physical Exam:   General: patient awake, alert and cooperative   Eyes: Normal lids and lashes, no scleral icterus   Neck: supple, normal ROM   Skin: warm and dry, not jaundiced   Cardiovascular: regular rhythm and rate, no murmurs auscultated   Pulm: clear to auscultation bilaterally, regular and unlabored   Abdomen: soft, nontender, nondistended; normal bowel sounds   Rectal: deferred   Extremities: no rash or edema   Psychiatric: Normal mood and behavior; memory intact         Results Review:    I have reviewed all of the patients current test results  Results from last 7 days   Lab Units 08/22/20  0540 08/21/20  0513 08/20/20  0517   WBC 10*3/mm3 7.02 9.26 15.18*   HEMOGLOBIN g/dL 8.5* 8.4* 8.5*   HEMATOCRIT % 25.3* 23.9* 24.4*   PLATELETS 10*3/mm3 131* 134* 152       Results from last 7 days   Lab Units 08/22/20  0539 08/21/20  0513 08/20/20  0517   SODIUM mmol/L 136 136 128*   POTASSIUM mmol/L 3.5 2.7* 3.1*   CHLORIDE mmol/L 105 104 100   CO2 mmol/L 18.0* 18.0* 12.0*   BUN mg/dL 32* 37* 43*   CREATININE mg/dL 1.95* 2.43* 2.90*   CALCIUM mg/dL 8.4* 8.0* 8.2*   BILIRUBIN mg/dL 0.2 0.2 0.2   ALK PHOS U/L 168* 136* 123*   ALT (SGPT) U/L 39 17 13   AST (SGOT) U/L 43* 18 13   GLUCOSE mg/dL 106* 107* 99       Results from last 7 days   Lab Units 08/19/20  1614   INR  1.51*       No results found for: LIPASE    Radiology:    Imaging Results (Last 24 Hours)     ** No results found for the last 24 hours. **            Assessment/Plan     Patient Active Problem List   Diagnosis Code   • Eustachian tube dysfunction H69.80   • Chronic rhinitis J31.0   •  Asymmetrical sensorineural hearing loss H90.5   • History of adenomatous polyp of colon Z86.010   • Atherosclerosis of native artery of both lower extremities with intermittent claudication (CMS/HCC) I70.213   • Accelerated hypertension I10   • Diarrhea of infectious origin A09   • Anxiety disorder F41.9   • Bilateral leg edema R60.0   • Ischemic colitis (CMS/HCC) K55.9   • Diarrhea R19.7   • AMAYA (acute kidney injury) (CMS/HCC) N17.9   • Avascular necrosis of femoral head, left (CMS/HCC) M87.052   • Hyponatremia E87.1   • Acute cystitis N30.00   • Metabolic acidosis, increased anion gap E87.2   • Weight loss R63.4   • E coli bacteremia R78.81, B96.20   • Iron deficiency anemia D50.9   • Hypomagnesemia E83.42   • Ventricular tachycardia, nonsustained (CMS/HCC) I47.2   • Crohn's disease of large intestine without complication (CMS/HCC) K50.10   • 3-vessel CAD I25.10   • COPD exacerbation (CMS/HCC) J44.1   • Displacement of lumbar intervertebral disc without myelopathy M51.26   • ED (erectile dysfunction) of organic origin N52.9   • Hyperlipidemia E78.5   • Hypertension, benign I10   • 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 Brewster on September 29.  He is to call the office if he were to start having difficulty prior to his office appointment.  He will remain on 4 Apriso tablets per day with Questran once per day post discharge.  Okay to discharge home from [facility] when primary feels appropriate.    Mike Whitney, APRN 9:43 AM      Mode Mendez DO  08/22/20  09:49

## 2020-08-22 NOTE — THERAPY DISCHARGE NOTE
Acute Care - Physical Therapy Discharge Summary  [facility]       Patient Name: Ricardo Hugo  : 1950  MRN: [id number]    Today's Date: 2020  Onset of Illness/Injury or Date of Surgery: 20       Referring Physician: Dr. Hammond      Admit Date: 2020      PT Recommendation and Plan    Visit Dx:    ICD-10-CM ICD-9-CM   1. Bacterial urinary infection N39.0 599.0    A49.9 041.9   2. Acute renal failure, unspecified acute renal failure type (CMS/[facility]) N17.9 584.9   3. Decreased activities of daily living (ADL) Z78.9 V49.89   4. Impaired mobility Z74.09 799.89   5. AMAYA (acute kidney injury) (CMS/[facility]) N17.9 584.9       Outcome Measures     Row Name 20 1200             How much help from another is currently needed...    Putting on and taking off regular lower body clothing?  4  -JJ      Bathing (including washing, rinsing, and drying)  3  -JJ      Toileting (which includes using toilet bed pan or urinal)  4  -JJ      Putting on and taking off regular upper body clothing  4  -JJ      Taking care of personal grooming (such as brushing teeth)  4  -JJ      Eating meals  4  -JJ      AM-PAC 6 Clicks Score (OT)  23  -         Functional Assessment    Outcome Measure Options  AM-PAC 6 Clicks Daily Activity (OT)  -        User Key  (r) = Recorded By, (t) = Taken By, (c) = Cosigned By    Initials Name Provider Type    Ruma Billingsley OTR/L Occupational Therapist              Rehab Goal Summary     Row Name 20 1308             Bed Mobility Goal 1 (PT)    Activity/Assistive Device (Bed Mobility Goal 1, PT)  bed mobility activities, all  -MF      [place] Level/Cues Needed (Bed Mobility Goal 1, PT)  independent  -MF      Time Frame (Bed Mobility Goal 1, PT)  long term goal (LTG)  -MF      Progress/Outcomes (Bed Mobility Goal 1, PT)  goal met  -         Transfer Goal 1 (PT)    Activity/Assistive Device (Transfer Goal 1, PT)  sit-to-stand/stand-to-sit;bed-to-chair/chair-to-bed  -MF       [place] Level/Cues Needed (Transfer Goal 1, PT)  independent  -MF      Time Frame (Transfer Goal 1, PT)  long term goal (LTG)  -MF      Progress/Outcome (Transfer Goal 1, PT)  goal not met  -MF         Gait Training Goal 1 (PT)    Activity/Assistive Device (Gait Training Goal 1, PT)  gait (walking locomotion);improve balance and speed;increase endurance/gait distance;increase energy conservation;decrease fall risk  -MF      [place] Level (Gait Training Goal 1, PT)  supervision required  -MF      Distance (Gait Goal 1, PT)  200  -MF      Time Frame (Gait Training Goal 1, PT)  long term goal (LTG)  -MF      Progress/Outcome (Gait Training Goal 1, PT)  goal not met  -MF         Stairs Goal 1 (PT)    Activity/Assistive Device (Stairs Goal 1, PT)  stairs, all skills;using handrail, right;using handrail, left  -MF      [place] Level/Cues Needed (Stairs Goal 1, PT)  supervision required  -MF      Number of Stairs (Stairs Goal 1, PT)  2  -MF      Time Frame (Stairs Goal 1, PT)  long term goal (LTG)  -MF      Progress/Outcome (Stairs Goal 1, PT)  goal not met  -MF        User Key  (r) = Recorded By, (t) = Taken By, (c) = Cosigned By    Initials Name Provider Type Discipline    Krystin Nielsen PTA Physical Therapy Assistant PT              PT Discharge Summary  Anticipated Discharge Disposition (PT): home  Reason for Discharge: Discharge from facility  Outcomes Achieved: Patient able to partially acheive established goals  Discharge Destination: Home      Krystin Omalley PTA   8/22/2020

## 2020-08-22 NOTE — PROGRESS NOTES
" PROGRESS NOTE.      Patient:  Ricardo Hugo  YOB: 1950  Date of Service: 8/22/2020  MRN: [id number]   Acct: [id number]   Primary Care Physician: Joe Velasco MD  Advance Directive:   Code Status and Medical Interventions:   Ordered at: 08/19/20 2053     Level Of Support Discussed With:    Patient     Code Status:    CPR     Medical Interventions (Level of Support Prior to Arrest):    Full     Admit Date: 8/19/2020       Hospital Day: 3  Referring Provider: Shana Irizarry,       Patient Seen, Chart, Consults, Notes, Labs, Radiology studies reviewed.      Subjective:  Ricardo Hugo is a 70 y.o. male  whom we were consulted for acute kidney injury. Patient has Crohn's disease with chronic recurrent diarrhea. He was admitted in July 2020 with a GI picture and was then found with creat at 4.38 (baseline at 1). On 7/22, his creat was 2.6. Patient is readmitted with diarrhea, abdominal pain and was also found with UTI. On admission, his creat was 3.2 and renal service was consulted for AMAYA. Patient noticed some drop in his urine output. He is now managed with IV fluids. He has no dysuria.   Today, he states that he is feeling a bit better. His diarrhea has improved and he is going home.       Review of Systems:  History obtained from chart review and the patient  General ROS: No fever or chills  Respiratory ROS: No cough, shortness of breath, wheezing  Cardiovascular ROS: no chest pain or dyspnea on exertion  Gastrointestinal ROS: No abdominal pain or melena  Genito-Urinary ROS: No dysuria or hematuria  Musculoskeletal: negative  Skin: negative    Objective:  /58 (BP Location: Left arm, Patient Position: Lying)   Pulse 62   Temp 98.1 °F (36.7 °C) (Oral)   Resp 18   Ht 182.9 cm (72\")   Wt 72.6 kg (160 lb)   SpO2 100%   BMI 21.70 kg/m²     Intake/Output Summary (Last 24 hours) at 8/22/2020 1256  Last data filed at 8/22/2020 1200  Gross per 24 hour   Intake 1304 ml   Output 2725 " ml   Net -1421 ml       Physical examination:  General: awake/alert   Chest:  clear to auscultation bilaterally without respiratory distress  CVS: regular rate and rhythm  Abdominal: soft, nontender, normal bowel sounds  Extremities: no cyanosis or edema  Skin: warm and dry without rash  Neuro: No focal motor deficits    Labs:  Lab Results (last 24 hours)     Procedure Component Value Units Date/Time    Comprehensive Metabolic Panel [[id number]]  (Abnormal) Collected:  08/22/20 0539    Specimen:  Blood Updated:  08/22/20 0633     Glucose 106 mg/dL      BUN 32 mg/dL      Creatinine 1.95 mg/dL      Sodium 136 mmol/L      Potassium 3.5 mmol/L      Chloride 105 mmol/L      CO2 18.0 mmol/L      Calcium 8.4 mg/dL      Total Protein 6.5 g/dL      Albumin 2.70 g/dL      ALT (SGPT) 39 U/L      AST (SGOT) 43 U/L      Alkaline Phosphatase 168 U/L      Total Bilirubin 0.2 mg/dL      eGFR Non African Amer 34 mL/min/1.73      Globulin 3.8 gm/dL      A/G Ratio 0.7 g/dL      BUN/Creatinine Ratio 16.4     Anion Gap 13.0 mmol/L     Narrative:       GFR Normal >60  Chronic Kidney Disease <60  Kidney Failure <15      CBC & Differential [[id number]] Collected:  08/22/20 0540    Specimen:  Blood Updated:  08/22/20 0608    Narrative:       The following orders were created for panel order CBC & Differential.  Procedure                               Abnormality         Status                     ---------                               -----------         ------                     CBC Auto Differential[[id number]]        Abnormal            Final result                 Please view results for these tests on the individual orders.    CBC Auto Differential [[id number]]  (Abnormal) Collected:  08/22/20 0540    Specimen:  Blood Updated:  08/22/20 0608     WBC 7.02 10*3/mm3      RBC 2.75 10*6/mm3      Hemoglobin 8.5 g/dL      Hematocrit 25.3 %      MCV 92.0 fL      MCH 30.9 pg      MCHC 33.6 g/dL      RDW 13.9 %      RDW-SD 47.0 fl      MPV 10.5 fL       Platelets 131 10*3/mm3      Neutrophil % 67.4 %      Lymphocyte % 17.0 %      Monocyte % 10.8 %      Eosinophil % 3.8 %      Basophil % 0.4 %      Immature Grans % 0.6 %      Neutrophils, Absolute 4.73 10*3/mm3      Lymphocytes, Absolute 1.19 10*3/mm3      Monocytes, Absolute 0.76 10*3/mm3      Eosinophils, Absolute 0.27 10*3/mm3      Basophils, Absolute 0.03 10*3/mm3      Immature Grans, Absolute 0.04 10*3/mm3      nRBC 0.0 /100 WBC     Blood Culture - Blood, Arm, Left [[id number]] Collected:  08/19/20 1854    Specimen:  Blood from Arm, Left Updated:  08/21/20 1915     Blood Culture No growth at 2 days    Blood Culture - Blood, Arm, Right [[id number]] Collected:  08/19/20 1854    Specimen:  Blood from Arm, Right Updated:  08/21/20 1915     Blood Culture No growth at 2 days          Radiology:   Imaging Results (Last 24 Hours)     ** No results found for the last 24 hours. **              Assessment   -AMAYA (pre renal azotemia )  -Crohn's disease  -Chronic diarrhea  -Hyponatremia  -Hypokalemia  -Metabolic acidosis  -UTI  -hypomagnesemia- better      Plan:  Continue hydration by mouth and oral alkali, replace potassium. Follow up labs. Continue antibiotics. Ok to discharge, follow up at the renal clinic within 10-14 days.       Bolivra Rueda MD  8/22/2020  12:56

## 2020-08-22 NOTE — PLAN OF CARE
Problem: Patient Care Overview  Goal: Plan of Care Review  Outcome: Ongoing (interventions implemented as appropriate)  Flowsheets (Taken 8/22/2020 0310)  Progress: improving  Plan of Care Reviewed With: patient  Note:   Pt denies pain or nausea. Voiding per urinal. IVF. VSS. Will cont to monitor.

## 2020-08-22 NOTE — OUTREACH NOTE
Prep Survey      Responses   [facility] patient discharged from?  [place]   Is LACE score < 7 ?  No   Eligibility  [facility]   Date of Admission  08/19/20   Date of Discharge  08/22/20   Discharge Disposition  Home or Self Care   Discharge diagnosis  UTI   COVID-19 Test Status  Negative   Does the patient have one of the following disease processes/diagnoses(primary or secondary)?  Other   Does the patient have Home health ordered?  No   Is there a DME ordered?  No   Prep survey completed?  Yes          Danitza Cooper RN

## 2020-08-24 ENCOUNTER — CLINICAL SUPPORT (OUTPATIENT)
Dept: INTERNAL MEDICINE | Facility: CLINIC | Age: 70
End: 2020-08-24

## 2020-08-24 ENCOUNTER — TRANSITIONAL CARE MANAGEMENT TELEPHONE ENCOUNTER (OUTPATIENT)
Dept: CALL CENTER | Facility: HOSPITAL | Age: 70
End: 2020-08-24

## 2020-08-24 ENCOUNTER — LAB (OUTPATIENT)
Dept: LAB | Facility: HOSPITAL | Age: 70
End: 2020-08-24

## 2020-08-24 DIAGNOSIS — D51.0 PERNICIOUS ANEMIA: ICD-10-CM

## 2020-08-24 DIAGNOSIS — N17.9 AKI (ACUTE KIDNEY INJURY) (HCC): ICD-10-CM

## 2020-08-24 LAB
ALBUMIN SERPL-MCNC: 3.2 G/DL (ref 3.5–5)
ALBUMIN/GLOB SERPL: 0.8 G/DL (ref 1.1–2.5)
ALP SERPL-CCNC: 208 U/L (ref 24–120)
ALT SERPL W P-5'-P-CCNC: 51 U/L (ref 0–50)
ANION GAP SERPL CALCULATED.3IONS-SCNC: 8 MMOL/L (ref 4–13)
AST SERPL-CCNC: 40 U/L (ref 7–45)
BACTERIA SPEC AEROBE CULT: NORMAL
BACTERIA SPEC AEROBE CULT: NORMAL
BILIRUB SERPL-MCNC: 0.2 MG/DL (ref 0.1–1)
BUN SERPL-MCNC: 22 MG/DL (ref 5–21)
BUN/CREAT SERPL: 10.9
CALCIUM SPEC-SCNC: 9.1 MG/DL (ref 8.4–10.4)
CHLORIDE SERPL-SCNC: 105 MMOL/L (ref 98–110)
CO2 SERPL-SCNC: 22 MMOL/L (ref 24–31)
CREAT SERPL-MCNC: 2.01 MG/DL (ref 0.5–1.4)
GFR SERPL CREATININE-BSD FRML MDRD: 33 ML/MIN/1.73
GLOBULIN UR ELPH-MCNC: 4 GM/DL
GLUCOSE SERPL-MCNC: 120 MG/DL (ref 70–100)
POTASSIUM SERPL-SCNC: 4.4 MMOL/L (ref 3.5–5.3)
PROT SERPL-MCNC: 7.2 G/DL (ref 6.3–8.7)
SODIUM SERPL-SCNC: 135 MMOL/L (ref 135–145)

## 2020-08-24 PROCEDURE — 80053 COMPREHEN METABOLIC PANEL: CPT

## 2020-08-24 PROCEDURE — 96372 THER/PROPH/DIAG INJ SC/IM: CPT | Performed by: INTERNAL MEDICINE

## 2020-08-24 NOTE — OUTREACH NOTE
Call Center TCM Note      Responses   [facility] patient discharged from?  [place]   COVID-19 Test Status  Negative   Does the patient have one of the following disease processes/diagnoses(primary or secondary)?  Other   TCM attempt successful?  Yes   Call start time  1410   Call end time  1412   Discharge diagnosis  UTI   Is patient permission given to speak with other caregiver?  No   Meds reviewed with patient/caregiver?  Yes   Is the patient having any side effects they believe may be caused by any medication additions or changes?  No   Does the patient have all medications ordered at discharge?  Yes   Is the patient taking all medications as directed (includes completed medication regime)?  Yes   Does the patient have a primary care provider?   Yes   Does the patient have an appointment with their PCP within 7 days of discharge?  Greater than 7 days   Comments regarding PCP  PCP Dr Joe Velasco. Patient has previously scheduled appt wiht Dr Velasco for 9/8, patient declines to make earlier appt.    Nursing Interventions  Verified appointment date/time/provider   Has the patient kept scheduled appointments due by today?  N/A   Has home health visited the patient within 72 hours of discharge?  N/A   Pulse Ox monitoring  None   Psychosocial issues?  No   Did the patient receive a copy of their discharge instructions?  Yes   Nursing interventions  Reviewed instructions with patient   What is the patient's perception of their health status since discharge?  Improving   Is the patient/caregiver able to teach back signs and symptoms related to disease process for when to call PCP?  Yes   TCM call completed?  Yes   Wrap up additional comments  Patient denies any questions or needs today. Brief call.           Barbar Weeks RN    8/24/2020, 14:12

## 2020-09-01 ENCOUNTER — TELEPHONE (OUTPATIENT)
Dept: VASCULAR SURGERY | Age: 70
End: 2020-09-01

## 2020-09-01 ENCOUNTER — READMISSION MANAGEMENT (OUTPATIENT)
Dept: CALL CENTER | Facility: HOSPITAL | Age: 70
End: 2020-09-01

## 2020-09-01 NOTE — TELEPHONE ENCOUNTER
Sunil Browne requests that someone return their call. The best time to reach him is Anytime. Pt only wants to come in once a year instead of every six months. He would like to move his up coming appts out until Spring of 2021. Thank you.

## 2020-09-01 NOTE — TELEPHONE ENCOUNTER
Called patient to let him know I spoke with Rosanna Cheung regarding pt wanting to move his appointments to yearly instead of every 6 months. Per Rosanna Cheung PA-C. We recommend he keep the 6 month follow up. Pt expressed her would still like to change it and will call if he has any questions or concerns.

## 2020-09-01 NOTE — OUTREACH NOTE
Medical Week 2 Survey      Responses   [facility] patient discharged from?  [place]   Does the patient have one of the following disease processes/diagnoses(primary or secondary)?  Other   Week 2 attempt successful?  No   Unsuccessful attempts  Attempt 1          Kelsey Silva RN

## 2020-09-03 ENCOUNTER — READMISSION MANAGEMENT (OUTPATIENT)
Dept: CALL CENTER | Facility: HOSPITAL | Age: 70
End: 2020-09-03

## 2020-09-03 NOTE — OUTREACH NOTE
Medical Week 2 Survey      Responses   [facility] patient discharged from?  [place]   COVID-19 Test Status  Negative   Does the patient have one of the following disease processes/diagnoses(primary or secondary)?  Other   Week 2 attempt successful?  No   Unsuccessful attempts  Attempt 2          Barbra Weeks RN

## 2020-09-08 ENCOUNTER — OFFICE VISIT (OUTPATIENT)
Dept: INTERNAL MEDICINE | Facility: CLINIC | Age: 70
End: 2020-09-08

## 2020-09-08 VITALS
TEMPERATURE: 97.8 F | DIASTOLIC BLOOD PRESSURE: 60 MMHG | HEIGHT: 72 IN | BODY MASS INDEX: 21.78 KG/M2 | SYSTOLIC BLOOD PRESSURE: 152 MMHG | WEIGHT: 160.8 LBS | OXYGEN SATURATION: 100 % | HEART RATE: 82 BPM

## 2020-09-08 DIAGNOSIS — R73.01 IMPAIRED FASTING BLOOD SUGAR: ICD-10-CM

## 2020-09-08 DIAGNOSIS — K50.10 CROHN'S DISEASE OF LARGE INTESTINE WITHOUT COMPLICATION (HCC): Primary | ICD-10-CM

## 2020-09-08 DIAGNOSIS — I10 ESSENTIAL HYPERTENSION: ICD-10-CM

## 2020-09-08 DIAGNOSIS — N17.9 AKI (ACUTE KIDNEY INJURY) (HCC): ICD-10-CM

## 2020-09-08 PROCEDURE — 99214 OFFICE O/P EST MOD 30 MIN: CPT | Performed by: INTERNAL MEDICINE

## 2020-09-08 RX ORDER — DIPHENHYDRAMINE HCL 25 MG
25 CAPSULE ORAL EVERY 6 HOURS PRN
COMMUNITY

## 2020-09-08 RX ORDER — BACLOFEN 10 MG/1
10 TABLET ORAL 3 TIMES DAILY
Qty: 30 TABLET | Refills: 0 | Status: SHIPPED | OUTPATIENT
Start: 2020-09-08 | End: 2020-09-22 | Stop reason: HOSPADM

## 2020-09-08 NOTE — PROGRESS NOTES
Transitional Care Follow Up Visit  Subjective     Ricardo PITER Hugo is a 70 y.o. male who presents for a transitional care management visit.    Within 48 business hours after discharge our office contacted him via telephone to coordinate his care and needs.      I reviewed and discussed the details of that call along with the discharge summary, hospital problems, inpatient lab results, inpatient diagnostic studies, and consultation reports with Ricardo.     Current outpatient and discharge medications have been reconciled for the patient.  Reviewed by: Joe Velasco MD      Date of TCM Phone Call 8/22/2020   [facility]   Date of Admission 8/19/2020   Date of Discharge 8/22/2020   Discharge Disposition Home or Self Care     Risk for Readmission (LACE) No data recorded    Patient presents for follow-up after recent hospitalization.  Patient was hospitalized with cute renal failure severe diarrhea accelerated hypertension he developed sepsis during the course of his stay or prior to his admission.  The sepsis was likely secondary to his diarrhea and inflammatory bowel disease.  Ring that hospitalization he underwent colonoscopy with biopsies suggestive of Crohn's disease.  He was also seen by renal to help manage his kidney failure.  Today he is much improved his diarrhea is managed by the use of Questran.  His blood pressure is somewhat borderline some of his medications were discontinued likely because of his renal failure and his hypotension throughout the course of his hospitalization.     Course During Hospital Stay: Please see the above history of present illness for his hospital course     The following portions of the patient's history were reviewed and updated as appropriate: allergies, current medications, past family history, past medical history, past social history, past surgical history and problem list.    Review of Systems   Constitutional: Negative for activity change, appetite  change and chills.   HENT: Negative for congestion, ear pain and facial swelling.    Eyes: Negative for pain, discharge and itching.   Respiratory: Negative for apnea, cough and shortness of breath.    Cardiovascular: Negative for chest pain, palpitations and leg swelling.   Gastrointestinal: Negative for abdominal distention, abdominal pain and anal bleeding.   Endocrine: Negative for cold intolerance and heat intolerance.   Genitourinary: Negative for difficulty urinating, dysuria and flank pain.   Musculoskeletal: Negative for arthralgias, back pain and joint swelling.   Skin: Negative for color change, pallor and rash.   Allergic/Immunologic: Negative for environmental allergies and food allergies.   Neurological: Negative for dizziness and facial asymmetry.   Hematological: Negative for adenopathy. Does not bruise/bleed easily.   Psychiatric/Behavioral: Negative for agitation, behavioral problems and confusion.       Objective   Physical Exam   Constitutional: He is oriented to person, place, and time. He appears well-developed and well-nourished.   HENT:   Head: Normocephalic and atraumatic.   Mouth/Throat: Oropharynx is clear and moist.   Eyes: Pupils are equal, round, and reactive to light. EOM are normal.   Neck: Normal range of motion. Neck supple.   Cardiovascular: Normal rate and regular rhythm.   Pulmonary/Chest: Effort normal and breath sounds normal.   Abdominal: Soft. Bowel sounds are normal.   Musculoskeletal: Normal range of motion.   Neurological: He is alert and oriented to person, place, and time.   Skin: Skin is warm and dry.   Psychiatric: He has a normal mood and affect. His behavior is normal.   Nursing note and vitals reviewed.      Assessment/Plan   Problems Addressed this Visit        Cardiovascular and Mediastinum    Essential hypertension       Digestive    Crohn's disease of large intestine without complication (CMS/HCC) - Primary       Endocrine    Impaired fasting blood sugar        Genitourinary    AMAYA (acute kidney injury) (CMS/HCC)        At this point patient does not have a follow-up evaluation with renal so in 4 weeks he will come back we will do a BMP prior to that visit.  In regard to his Crohn's disease he is continued to be followed by Dr. Bran and that seems to be under good control.  His blood pressure is not adequately controlled and we may need to add additional medications however I want to see him back in 4 weeks and recheck his BMP before adding additional agents.

## 2020-09-10 ENCOUNTER — READMISSION MANAGEMENT (OUTPATIENT)
Dept: CALL CENTER | Facility: HOSPITAL | Age: 70
End: 2020-09-10

## 2020-09-10 NOTE — OUTREACH NOTE
Medical Week 3 Survey      Responses   [facility] [place] patient discharged from?  [place]   COVID-19 Test Status  Negative   Does the patient have one of the following disease processes/diagnoses(primary or secondary)?  Other   Week 3 attempt successful?  Yes   Call start time  1308   Call end time  1309   Discharge diagnosis  UTI   Is patient permission given to speak with other caregiver?  No   Meds reviewed with patient/caregiver?  Yes   Is the patient having any side effects they believe may be caused by any medication additions or changes?  No   Does the patient have all medications ordered at discharge?  Yes   Is the patient taking all medications as directed (includes completed medication regime)?  Yes   Does the patient have a primary care provider?   Yes   Has the patient kept scheduled appointments due by today?  Yes   Pulse Ox monitoring  None   Psychosocial issues?  No   Did the patient receive a copy of their discharge instructions?  Yes   Nursing interventions  Reviewed instructions with patient, Educated on MyChart   What is the patient's perception of their health status since discharge?  Improving   Is the patient/caregiver able to teach back signs and symptoms related to disease process for when to call PCP?  Yes   Is the patient/caregiver able to teach back signs and symptoms related to disease process for when to call 911?  Yes   Is the patient/caregiver able to teach back the hierarchy of who to call/visit for symptoms/problems? PCP, Specialist, Home health nurse, Urgent Care, ED, 911  Yes   Week 3 Call Completed?  Yes          Love Dumas RN

## 2020-09-15 ENCOUNTER — HOSPITAL ENCOUNTER (INPATIENT)
Facility: HOSPITAL | Age: 70
LOS: 7 days | Discharge: HOME | End: 2020-09-22
Attending: INTERNAL MEDICINE | Admitting: INTERNAL MEDICINE

## 2020-09-15 ENCOUNTER — APPOINTMENT (OUTPATIENT)
Dept: CT IMAGING | Facility: HOSPITAL | Age: 70
End: 2020-09-15

## 2020-09-15 ENCOUNTER — APPOINTMENT (OUTPATIENT)
Dept: GENERAL RADIOLOGY | Facility: HOSPITAL | Age: 70
End: 2020-09-15

## 2020-09-15 DIAGNOSIS — N12 PYELONEPHRITIS: ICD-10-CM

## 2020-09-15 DIAGNOSIS — E87.20 METABOLIC ACIDOSIS: ICD-10-CM

## 2020-09-15 DIAGNOSIS — R53.1 WEAKNESS: ICD-10-CM

## 2020-09-15 DIAGNOSIS — J18.9 PNEUMONIA DUE TO INFECTIOUS ORGANISM, UNSPECIFIED LATERALITY, UNSPECIFIED PART OF LUNG: ICD-10-CM

## 2020-09-15 DIAGNOSIS — Z74.09 IMPAIRED MOBILITY: ICD-10-CM

## 2020-09-15 DIAGNOSIS — R65.20 SEVERE SEPSIS (HCC): Primary | ICD-10-CM

## 2020-09-15 DIAGNOSIS — A41.9 SEVERE SEPSIS (HCC): Primary | ICD-10-CM

## 2020-09-15 DIAGNOSIS — R19.7 DIARRHEA, UNSPECIFIED TYPE: ICD-10-CM

## 2020-09-15 DIAGNOSIS — N17.9 ACUTE RENAL FAILURE SUPERIMPOSED ON CHRONIC KIDNEY DISEASE, UNSPECIFIED CKD STAGE, UNSPECIFIED ACUTE RENAL FAILURE TYPE (HCC): ICD-10-CM

## 2020-09-15 DIAGNOSIS — N18.9 ACUTE RENAL FAILURE SUPERIMPOSED ON CHRONIC KIDNEY DISEASE, UNSPECIFIED CKD STAGE, UNSPECIFIED ACUTE RENAL FAILURE TYPE (HCC): ICD-10-CM

## 2020-09-15 DIAGNOSIS — E83.42 HYPOMAGNESEMIA: ICD-10-CM

## 2020-09-15 DIAGNOSIS — Z78.9 DECREASED ACTIVITIES OF DAILY LIVING (ADL): ICD-10-CM

## 2020-09-15 PROBLEM — K50.118 CROHN'S DISEASE OF LARGE INTESTINE WITH OTHER COMPLICATION: Status: ACTIVE | Noted: 2020-07-30

## 2020-09-15 PROBLEM — K52.9 ENTEROCOLITIS: Status: ACTIVE | Noted: 2020-09-15

## 2020-09-15 PROBLEM — R91.8 INFILTRATE OF LOWER LOBE OF RIGHT LUNG PRESENT ON IMAGING STUDY: Status: ACTIVE | Noted: 2020-09-15

## 2020-09-15 LAB
ALBUMIN SERPL-MCNC: 3.6 G/DL (ref 3.5–5.2)
ALBUMIN/GLOB SERPL: 0.7 G/DL
ALP SERPL-CCNC: 197 U/L (ref 39–117)
ALT SERPL W P-5'-P-CCNC: 19 U/L (ref 1–41)
ANION GAP SERPL CALCULATED.3IONS-SCNC: 17 MMOL/L (ref 5–15)
AST SERPL-CCNC: 18 U/L (ref 1–40)
BACTERIA UR QL AUTO: ABNORMAL /HPF
BASOPHILS # BLD AUTO: 0.07 10*3/MM3 (ref 0–0.2)
BASOPHILS NFR BLD AUTO: 0.3 % (ref 0–1.5)
BILIRUB SERPL-MCNC: 0.5 MG/DL (ref 0–1.2)
BILIRUB UR QL STRIP: NEGATIVE
BUN SERPL-MCNC: 40 MG/DL (ref 8–23)
BUN/CREAT SERPL: 13.7 (ref 7–25)
C DIFF TOX GENS STL QL NAA+PROBE: NEGATIVE
CALCIUM SPEC-SCNC: 9.4 MG/DL (ref 8.6–10.5)
CHLORIDE SERPL-SCNC: 103 MMOL/L (ref 98–107)
CLARITY UR: ABNORMAL
CO2 SERPL-SCNC: 13 MMOL/L (ref 22–29)
COLOR UR: YELLOW
CREAT SERPL-MCNC: 2.92 MG/DL (ref 0.76–1.27)
D-LACTATE SERPL-SCNC: 1.7 MMOL/L (ref 0.5–2)
DEPRECATED RDW RBC AUTO: 46.7 FL (ref 37–54)
EOSINOPHIL # BLD AUTO: 0 10*3/MM3 (ref 0–0.4)
EOSINOPHIL NFR BLD AUTO: 0 % (ref 0.3–6.2)
ERYTHROCYTE [DISTWIDTH] IN BLOOD BY AUTOMATED COUNT: 13.8 % (ref 12.3–15.4)
GFR SERPL CREATININE-BSD FRML MDRD: 21 ML/MIN/1.73
GLOBULIN UR ELPH-MCNC: 4.9 GM/DL
GLUCOSE SERPL-MCNC: 104 MG/DL (ref 65–99)
GLUCOSE UR STRIP-MCNC: NEGATIVE MG/DL
HCT VFR BLD AUTO: 33.5 % (ref 37.5–51)
HGB BLD-MCNC: 11.1 G/DL (ref 13–17.7)
HGB UR QL STRIP.AUTO: ABNORMAL
HOLD SPECIMEN: NORMAL
HYALINE CASTS UR QL AUTO: ABNORMAL /LPF
IMM GRANULOCYTES # BLD AUTO: 0.09 10*3/MM3 (ref 0–0.05)
IMM GRANULOCYTES NFR BLD AUTO: 0.4 % (ref 0–0.5)
KETONES UR QL STRIP: NEGATIVE
LDH SERPL-CCNC: 166 U/L (ref 135–225)
LEUKOCYTE ESTERASE UR QL STRIP.AUTO: ABNORMAL
LIPASE SERPL-CCNC: 19 U/L (ref 13–60)
LYMPHOCYTES # BLD AUTO: 1.94 10*3/MM3 (ref 0.7–3.1)
LYMPHOCYTES NFR BLD AUTO: 9 % (ref 19.6–45.3)
MAGNESIUM SERPL-MCNC: 0.8 MG/DL (ref 1.6–2.4)
MCH RBC QN AUTO: 30.3 PG (ref 26.6–33)
MCHC RBC AUTO-ENTMCNC: 33.1 G/DL (ref 31.5–35.7)
MCV RBC AUTO: 91.5 FL (ref 79–97)
MONOCYTES # BLD AUTO: 2.23 10*3/MM3 (ref 0.1–0.9)
MONOCYTES NFR BLD AUTO: 10.4 % (ref 5–12)
NEUTROPHILS NFR BLD AUTO: 17.19 10*3/MM3 (ref 1.7–7)
NEUTROPHILS NFR BLD AUTO: 79.9 % (ref 42.7–76)
NITRITE UR QL STRIP: POSITIVE
NRBC BLD AUTO-RTO: 0 /100 WBC (ref 0–0.2)
PH UR STRIP.AUTO: 5.5 [PH] (ref 5–8)
PLATELET # BLD AUTO: 190 10*3/MM3 (ref 140–450)
PMV BLD AUTO: 9.6 FL (ref 6–12)
POTASSIUM SERPL-SCNC: 3.2 MMOL/L (ref 3.5–5.2)
PROCALCITONIN SERPL-MCNC: 17.37 NG/ML (ref 0–0.25)
PROT SERPL-MCNC: 8.5 G/DL (ref 6–8.5)
PROT UR QL STRIP: ABNORMAL
RBC # BLD AUTO: 3.66 10*6/MM3 (ref 4.14–5.8)
RBC # UR: ABNORMAL /HPF
REF LAB TEST METHOD: ABNORMAL
SARS-COV-2 RNA PNL SPEC NAA+PROBE: NOT DETECTED
SODIUM SERPL-SCNC: 133 MMOL/L (ref 136–145)
SP GR UR STRIP: 1.01 (ref 1–1.03)
SQUAMOUS #/AREA URNS HPF: ABNORMAL /HPF
UROBILINOGEN UR QL STRIP: ABNORMAL
WBC # BLD AUTO: 21.52 10*3/MM3 (ref 3.4–10.8)
WBC UR QL AUTO: ABNORMAL /HPF
WHOLE BLOOD HOLD SPECIMEN: NORMAL
WHOLE BLOOD HOLD SPECIMEN: NORMAL

## 2020-09-15 PROCEDURE — 93005 ELECTROCARDIOGRAM TRACING: CPT | Performed by: INTERNAL MEDICINE

## 2020-09-15 PROCEDURE — 87493 C DIFF AMPLIFIED PROBE: CPT | Performed by: EMERGENCY MEDICINE

## 2020-09-15 PROCEDURE — 81001 URINALYSIS AUTO W/SCOPE: CPT | Performed by: EMERGENCY MEDICINE

## 2020-09-15 PROCEDURE — 93005 ELECTROCARDIOGRAM TRACING: CPT

## 2020-09-15 PROCEDURE — 87040 BLOOD CULTURE FOR BACTERIA: CPT | Performed by: EMERGENCY MEDICINE

## 2020-09-15 PROCEDURE — 74176 CT ABD & PELVIS W/O CONTRAST: CPT

## 2020-09-15 PROCEDURE — 87635 SARS-COV-2 COVID-19 AMP PRB: CPT | Performed by: EMERGENCY MEDICINE

## 2020-09-15 PROCEDURE — 83615 LACTATE (LD) (LDH) ENZYME: CPT | Performed by: EMERGENCY MEDICINE

## 2020-09-15 PROCEDURE — 83605 ASSAY OF LACTIC ACID: CPT | Performed by: EMERGENCY MEDICINE

## 2020-09-15 PROCEDURE — 99285 EMERGENCY DEPT VISIT HI MDM: CPT

## 2020-09-15 PROCEDURE — 83690 ASSAY OF LIPASE: CPT | Performed by: INTERNAL MEDICINE

## 2020-09-15 PROCEDURE — 84145 PROCALCITONIN (PCT): CPT | Performed by: EMERGENCY MEDICINE

## 2020-09-15 PROCEDURE — 93010 ELECTROCARDIOGRAM REPORT: CPT | Performed by: INTERNAL MEDICINE

## 2020-09-15 PROCEDURE — 87088 URINE BACTERIA CULTURE: CPT | Performed by: EMERGENCY MEDICINE

## 2020-09-15 PROCEDURE — 87186 SC STD MICRODIL/AGAR DIL: CPT | Performed by: EMERGENCY MEDICINE

## 2020-09-15 PROCEDURE — 25010000002 ERTAPENEM PER 500 MG: Performed by: EMERGENCY MEDICINE

## 2020-09-15 PROCEDURE — 83735 ASSAY OF MAGNESIUM: CPT | Performed by: NURSE PRACTITIONER

## 2020-09-15 PROCEDURE — 87086 URINE CULTURE/COLONY COUNT: CPT | Performed by: EMERGENCY MEDICINE

## 2020-09-15 PROCEDURE — 71045 X-RAY EXAM CHEST 1 VIEW: CPT

## 2020-09-15 PROCEDURE — 85025 COMPLETE CBC W/AUTO DIFF WBC: CPT | Performed by: INTERNAL MEDICINE

## 2020-09-15 PROCEDURE — 80053 COMPREHEN METABOLIC PANEL: CPT | Performed by: INTERNAL MEDICINE

## 2020-09-15 RX ORDER — MAGNESIUM SULFATE HEPTAHYDRATE 40 MG/ML
2 INJECTION, SOLUTION INTRAVENOUS ONCE
Status: COMPLETED | OUTPATIENT
Start: 2020-09-15 | End: 2020-09-16

## 2020-09-15 RX ORDER — LEVOFLOXACIN 5 MG/ML
500 INJECTION, SOLUTION INTRAVENOUS ONCE
Status: COMPLETED | OUTPATIENT
Start: 2020-09-15 | End: 2020-09-16

## 2020-09-15 RX ORDER — DEXTROSE MONOHYDRATE 50 MG/ML
125 INJECTION, SOLUTION INTRAVENOUS CONTINUOUS
Status: DISCONTINUED | OUTPATIENT
Start: 2020-09-15 | End: 2020-09-15

## 2020-09-15 RX ORDER — ACETAMINOPHEN 500 MG
1000 TABLET ORAL ONCE
Status: COMPLETED | OUTPATIENT
Start: 2020-09-15 | End: 2020-09-15

## 2020-09-15 RX ORDER — POTASSIUM CHLORIDE 7.45 MG/ML
10 INJECTION INTRAVENOUS ONCE
Status: DISCONTINUED | OUTPATIENT
Start: 2020-09-15 | End: 2020-09-21

## 2020-09-15 RX ADMIN — ERTAPENEM SODIUM 1 G: 1 INJECTION, POWDER, LYOPHILIZED, FOR SOLUTION INTRAMUSCULAR; INTRAVENOUS at 21:06

## 2020-09-15 RX ADMIN — SODIUM CHLORIDE 1000 ML: 9 INJECTION, SOLUTION INTRAVENOUS at 19:58

## 2020-09-15 RX ADMIN — SODIUM BICARBONATE 150 MEQ: 84 INJECTION, SOLUTION INTRAVENOUS at 22:47

## 2020-09-15 RX ADMIN — ACETAMINOPHEN 1000 MG: 500 TABLET, FILM COATED ORAL at 19:58

## 2020-09-16 ENCOUNTER — APPOINTMENT (OUTPATIENT)
Dept: ULTRASOUND IMAGING | Facility: HOSPITAL | Age: 70
End: 2020-09-16

## 2020-09-16 ENCOUNTER — READMISSION MANAGEMENT (OUTPATIENT)
Dept: CALL CENTER | Facility: HOSPITAL | Age: 70
End: 2020-09-16

## 2020-09-16 PROBLEM — N39.0 UTI (URINARY TRACT INFECTION), BACTERIAL: Status: ACTIVE | Noted: 2020-09-16

## 2020-09-16 PROBLEM — K52.9 ENTEROCOLITIS: Status: ACTIVE | Noted: 2020-09-16

## 2020-09-16 PROBLEM — R78.81 E COLI BACTEREMIA: Status: RESOLVED | Noted: 2020-07-12 | Resolved: 2020-09-16

## 2020-09-16 PROBLEM — K52.9 CHRONIC DIARRHEA: Status: ACTIVE | Noted: 2020-09-16

## 2020-09-16 PROBLEM — K50.118 CROHN'S DISEASE OF LARGE INTESTINE WITH OTHER COMPLICATION: Status: ACTIVE | Noted: 2020-09-16

## 2020-09-16 PROBLEM — B96.20 E COLI BACTEREMIA: Status: RESOLVED | Noted: 2020-07-12 | Resolved: 2020-09-16

## 2020-09-16 PROBLEM — A49.9 UTI (URINARY TRACT INFECTION), BACTERIAL: Status: ACTIVE | Noted: 2020-09-16

## 2020-09-16 LAB
25(OH)D3 SERPL-MCNC: 20.8 NG/ML (ref 30–100)
ALBUMIN SERPL-MCNC: 3.5 G/DL (ref 3.5–5.2)
ALBUMIN/GLOB SERPL: 0.9 G/DL
ALP SERPL-CCNC: 172 U/L (ref 39–117)
ALT SERPL W P-5'-P-CCNC: 16 U/L (ref 1–41)
ANION GAP SERPL CALCULATED.3IONS-SCNC: 13 MMOL/L (ref 5–15)
ANION GAP SERPL CALCULATED.3IONS-SCNC: 14 MMOL/L (ref 5–15)
AST SERPL-CCNC: 17 U/L (ref 1–40)
BASOPHILS # BLD AUTO: 0.04 10*3/MM3 (ref 0–0.2)
BASOPHILS NFR BLD AUTO: 0.2 % (ref 0–1.5)
BILIRUB SERPL-MCNC: 0.4 MG/DL (ref 0–1.2)
BUN SERPL-MCNC: 40 MG/DL (ref 8–23)
BUN SERPL-MCNC: 42 MG/DL (ref 8–23)
BUN/CREAT SERPL: 14 (ref 7–25)
BUN/CREAT SERPL: 14.3 (ref 7–25)
CALCIUM SPEC-SCNC: 8.4 MG/DL (ref 8.6–10.5)
CALCIUM SPEC-SCNC: 8.9 MG/DL (ref 8.6–10.5)
CHLORIDE SERPL-SCNC: 102 MMOL/L (ref 98–107)
CHLORIDE SERPL-SCNC: 103 MMOL/L (ref 98–107)
CO2 SERPL-SCNC: 18 MMOL/L (ref 22–29)
CO2 SERPL-SCNC: 18 MMOL/L (ref 22–29)
CREAT SERPL-MCNC: 2.8 MG/DL (ref 0.76–1.27)
CREAT SERPL-MCNC: 2.99 MG/DL (ref 0.76–1.27)
CREAT UR-MCNC: 36.8 MG/DL
DEPRECATED RDW RBC AUTO: 46.7 FL (ref 37–54)
EOSINOPHIL # BLD AUTO: 0 10*3/MM3 (ref 0–0.4)
EOSINOPHIL NFR BLD AUTO: 0 % (ref 0.3–6.2)
EOSINOPHIL SPEC QL MICRO: 0 % EOS/100 CELLS (ref 0–0)
ERYTHROCYTE [DISTWIDTH] IN BLOOD BY AUTOMATED COUNT: 13.9 % (ref 12.3–15.4)
GFR SERPL CREATININE-BSD FRML MDRD: 21 ML/MIN/1.73
GFR SERPL CREATININE-BSD FRML MDRD: 22 ML/MIN/1.73
GLOBULIN UR ELPH-MCNC: 4.1 GM/DL
GLUCOSE SERPL-MCNC: 125 MG/DL (ref 65–99)
GLUCOSE SERPL-MCNC: 129 MG/DL (ref 65–99)
HBV SURFACE AG SERPL QL IA: NORMAL
HCT VFR BLD AUTO: 28.4 % (ref 37.5–51)
HGB BLD-MCNC: 9.6 G/DL (ref 13–17.7)
HOLD SPECIMEN: NORMAL
IMM GRANULOCYTES # BLD AUTO: 0.11 10*3/MM3 (ref 0–0.05)
IMM GRANULOCYTES NFR BLD AUTO: 0.7 % (ref 0–0.5)
IRON 24H UR-MRATE: 11 MCG/DL (ref 59–158)
IRON SATN MFR SERPL: 6 % (ref 20–50)
LYMPHOCYTES # BLD AUTO: 1.32 10*3/MM3 (ref 0.7–3.1)
LYMPHOCYTES NFR BLD AUTO: 7.9 % (ref 19.6–45.3)
MAGNESIUM SERPL-MCNC: 2.4 MG/DL (ref 1.6–2.4)
MAGNESIUM SERPL-MCNC: 2.7 MG/DL (ref 1.6–2.4)
MAGNESIUM SERPL-MCNC: 2.8 MG/DL (ref 1.6–2.4)
MCH RBC QN AUTO: 31 PG (ref 26.6–33)
MCHC RBC AUTO-ENTMCNC: 33.8 G/DL (ref 31.5–35.7)
MCV RBC AUTO: 91.6 FL (ref 79–97)
MONOCYTES # BLD AUTO: 1.37 10*3/MM3 (ref 0.1–0.9)
MONOCYTES NFR BLD AUTO: 8.2 % (ref 5–12)
NEUTROPHILS NFR BLD AUTO: 13.91 10*3/MM3 (ref 1.7–7)
NEUTROPHILS NFR BLD AUTO: 83 % (ref 42.7–76)
NRBC BLD AUTO-RTO: 0 /100 WBC (ref 0–0.2)
OSMOLALITY UR: 193 MOSM/KG (ref 601–850)
PHOSPHATE SERPL-MCNC: 2.2 MG/DL (ref 2.5–4.5)
PLATELET # BLD AUTO: 137 10*3/MM3 (ref 140–450)
PMV BLD AUTO: 9.6 FL (ref 6–12)
POTASSIUM SERPL-SCNC: 3.1 MMOL/L (ref 3.5–5.2)
POTASSIUM SERPL-SCNC: 3.5 MMOL/L (ref 3.5–5.2)
PROT SERPL-MCNC: 7.6 G/DL (ref 6–8.5)
RBC # BLD AUTO: 3.1 10*6/MM3 (ref 4.14–5.8)
SODIUM SERPL-SCNC: 133 MMOL/L (ref 136–145)
SODIUM SERPL-SCNC: 135 MMOL/L (ref 136–145)
SODIUM UR-SCNC: 39 MMOL/L
TIBC SERPL-MCNC: 183 MCG/DL (ref 298–536)
TRANSFERRIN SERPL-MCNC: 123 MG/DL (ref 200–360)
WBC # BLD AUTO: 16.75 10*3/MM3 (ref 3.4–10.8)

## 2020-09-16 PROCEDURE — 97165 OT EVAL LOW COMPLEX 30 MIN: CPT

## 2020-09-16 PROCEDURE — 87340 HEPATITIS B SURFACE AG IA: CPT | Performed by: INTERNAL MEDICINE

## 2020-09-16 PROCEDURE — 82306 VITAMIN D 25 HYDROXY: CPT | Performed by: CLINICAL NURSE SPECIALIST

## 2020-09-16 PROCEDURE — 83735 ASSAY OF MAGNESIUM: CPT | Performed by: CLINICAL NURSE SPECIALIST

## 2020-09-16 PROCEDURE — 83735 ASSAY OF MAGNESIUM: CPT | Performed by: INTERNAL MEDICINE

## 2020-09-16 PROCEDURE — 97162 PT EVAL MOD COMPLEX 30 MIN: CPT

## 2020-09-16 PROCEDURE — 86706 HEP B SURFACE ANTIBODY: CPT | Performed by: INTERNAL MEDICINE

## 2020-09-16 PROCEDURE — 25010000002 MAGNESIUM SULFATE 2 GM/50ML SOLUTION: Performed by: EMERGENCY MEDICINE

## 2020-09-16 PROCEDURE — 94799 UNLISTED PULMONARY SVC/PX: CPT

## 2020-09-16 PROCEDURE — 87350 HEPATITIS BE AG IA: CPT | Performed by: INTERNAL MEDICINE

## 2020-09-16 PROCEDURE — 84100 ASSAY OF PHOSPHORUS: CPT | Performed by: CLINICAL NURSE SPECIALIST

## 2020-09-16 PROCEDURE — 86704 HEP B CORE ANTIBODY TOTAL: CPT | Performed by: INTERNAL MEDICINE

## 2020-09-16 PROCEDURE — 86481 TB AG RESPONSE T-CELL SUSP: CPT | Performed by: INTERNAL MEDICINE

## 2020-09-16 PROCEDURE — 84300 ASSAY OF URINE SODIUM: CPT | Performed by: CLINICAL NURSE SPECIALIST

## 2020-09-16 PROCEDURE — 25010000002 LEVOFLOXACIN PER 250 MG: Performed by: EMERGENCY MEDICINE

## 2020-09-16 PROCEDURE — 99222 1ST HOSP IP/OBS MODERATE 55: CPT | Performed by: INTERNAL MEDICINE

## 2020-09-16 PROCEDURE — 83735 ASSAY OF MAGNESIUM: CPT | Performed by: NURSE PRACTITIONER

## 2020-09-16 PROCEDURE — 83540 ASSAY OF IRON: CPT | Performed by: INTERNAL MEDICINE

## 2020-09-16 PROCEDURE — 84466 ASSAY OF TRANSFERRIN: CPT | Performed by: INTERNAL MEDICINE

## 2020-09-16 PROCEDURE — 82570 ASSAY OF URINE CREATININE: CPT | Performed by: CLINICAL NURSE SPECIALIST

## 2020-09-16 PROCEDURE — 25010000002 CEFTRIAXONE PER 250 MG: Performed by: FAMILY MEDICINE

## 2020-09-16 PROCEDURE — 87205 SMEAR GRAM STAIN: CPT | Performed by: CLINICAL NURSE SPECIALIST

## 2020-09-16 PROCEDURE — 83935 ASSAY OF URINE OSMOLALITY: CPT | Performed by: CLINICAL NURSE SPECIALIST

## 2020-09-16 PROCEDURE — 85025 COMPLETE CBC W/AUTO DIFF WBC: CPT | Performed by: NURSE PRACTITIONER

## 2020-09-16 PROCEDURE — 25010000002 MAGNESIUM SULFATE 2 GM/50ML SOLUTION: Performed by: NURSE PRACTITIONER

## 2020-09-16 PROCEDURE — 76775 US EXAM ABDO BACK WALL LIM: CPT

## 2020-09-16 PROCEDURE — 80053 COMPREHEN METABOLIC PANEL: CPT | Performed by: NURSE PRACTITIONER

## 2020-09-16 RX ORDER — AMLODIPINE BESYLATE 10 MG/1
10 TABLET ORAL DAILY
Status: DISCONTINUED | OUTPATIENT
Start: 2020-09-16 | End: 2020-09-22 | Stop reason: HOSPADM

## 2020-09-16 RX ORDER — MAGNESIUM SULFATE HEPTAHYDRATE 40 MG/ML
2 INJECTION, SOLUTION INTRAVENOUS
Status: COMPLETED | OUTPATIENT
Start: 2020-09-16 | End: 2020-09-16

## 2020-09-16 RX ORDER — MAGNESIUM SULFATE HEPTAHYDRATE 40 MG/ML
2 INJECTION, SOLUTION INTRAVENOUS AS NEEDED
Status: DISCONTINUED | OUTPATIENT
Start: 2020-09-16 | End: 2020-09-22 | Stop reason: HOSPADM

## 2020-09-16 RX ORDER — ASPIRIN 81 MG/1
81 TABLET ORAL DAILY
Status: DISCONTINUED | OUTPATIENT
Start: 2020-09-16 | End: 2020-09-22 | Stop reason: HOSPADM

## 2020-09-16 RX ORDER — ONDANSETRON 2 MG/ML
4 INJECTION INTRAMUSCULAR; INTRAVENOUS EVERY 6 HOURS PRN
Status: DISCONTINUED | OUTPATIENT
Start: 2020-09-16 | End: 2020-09-22 | Stop reason: HOSPADM

## 2020-09-16 RX ORDER — PANTOPRAZOLE SODIUM 40 MG/1
40 TABLET, DELAYED RELEASE ORAL
Status: DISCONTINUED | OUTPATIENT
Start: 2020-09-16 | End: 2020-09-22 | Stop reason: HOSPADM

## 2020-09-16 RX ORDER — MESALAMINE 0.38 G/1
1.5 CAPSULE, EXTENDED RELEASE ORAL DAILY
Status: DISCONTINUED | OUTPATIENT
Start: 2020-09-16 | End: 2020-09-22 | Stop reason: HOSPADM

## 2020-09-16 RX ORDER — ONDANSETRON 4 MG/1
4 TABLET, FILM COATED ORAL EVERY 6 HOURS PRN
Status: DISCONTINUED | OUTPATIENT
Start: 2020-09-16 | End: 2020-09-22 | Stop reason: HOSPADM

## 2020-09-16 RX ORDER — CHOLESTYRAMINE 4 G/9G
1 POWDER, FOR SUSPENSION ORAL DAILY
Status: DISCONTINUED | OUTPATIENT
Start: 2020-09-16 | End: 2020-09-16

## 2020-09-16 RX ORDER — AZELASTINE 1 MG/ML
1 SPRAY, METERED NASAL 2 TIMES DAILY
Status: DISCONTINUED | OUTPATIENT
Start: 2020-09-16 | End: 2020-09-22 | Stop reason: HOSPADM

## 2020-09-16 RX ORDER — FLUTICASONE PROPIONATE 50 MCG
2 SPRAY, SUSPENSION (ML) NASAL DAILY PRN
Status: DISCONTINUED | OUTPATIENT
Start: 2020-09-16 | End: 2020-09-22 | Stop reason: HOSPADM

## 2020-09-16 RX ORDER — ACETAMINOPHEN 650 MG/1
650 SUPPOSITORY RECTAL EVERY 4 HOURS PRN
Status: DISCONTINUED | OUTPATIENT
Start: 2020-09-16 | End: 2020-09-22 | Stop reason: HOSPADM

## 2020-09-16 RX ORDER — POTASSIUM CHLORIDE 750 MG/1
40 CAPSULE, EXTENDED RELEASE ORAL AS NEEDED
Status: DISCONTINUED | OUTPATIENT
Start: 2020-09-16 | End: 2020-09-16

## 2020-09-16 RX ORDER — METOPROLOL SUCCINATE 25 MG/1
25 TABLET, EXTENDED RELEASE ORAL DAILY
Status: DISCONTINUED | OUTPATIENT
Start: 2020-09-16 | End: 2020-09-22 | Stop reason: HOSPADM

## 2020-09-16 RX ORDER — SODIUM CHLORIDE 0.9 % (FLUSH) 0.9 %
10 SYRINGE (ML) INJECTION AS NEEDED
Status: DISCONTINUED | OUTPATIENT
Start: 2020-09-16 | End: 2020-09-22 | Stop reason: HOSPADM

## 2020-09-16 RX ORDER — DIPHENHYDRAMINE HCL 25 MG
25 CAPSULE ORAL EVERY 6 HOURS PRN
Status: DISCONTINUED | OUTPATIENT
Start: 2020-09-16 | End: 2020-09-22 | Stop reason: HOSPADM

## 2020-09-16 RX ORDER — POTASSIUM CHLORIDE 750 MG/1
20 CAPSULE, EXTENDED RELEASE ORAL DAILY
Status: DISCONTINUED | OUTPATIENT
Start: 2020-09-16 | End: 2020-09-18

## 2020-09-16 RX ORDER — ACETAMINOPHEN 160 MG/5ML
650 SOLUTION ORAL EVERY 4 HOURS PRN
Status: DISCONTINUED | OUTPATIENT
Start: 2020-09-16 | End: 2020-09-22 | Stop reason: HOSPADM

## 2020-09-16 RX ORDER — POTASSIUM CHLORIDE 750 MG/1
40 CAPSULE, EXTENDED RELEASE ORAL
Status: COMPLETED | OUTPATIENT
Start: 2020-09-16 | End: 2020-09-16

## 2020-09-16 RX ORDER — IPRATROPIUM BROMIDE AND ALBUTEROL SULFATE 2.5; .5 MG/3ML; MG/3ML
3 SOLUTION RESPIRATORY (INHALATION) EVERY 4 HOURS PRN
Status: DISCONTINUED | OUTPATIENT
Start: 2020-09-16 | End: 2020-09-16

## 2020-09-16 RX ORDER — MAGNESIUM SULFATE HEPTAHYDRATE 40 MG/ML
4 INJECTION, SOLUTION INTRAVENOUS AS NEEDED
Status: DISCONTINUED | OUTPATIENT
Start: 2020-09-16 | End: 2020-09-22 | Stop reason: HOSPADM

## 2020-09-16 RX ORDER — CHOLESTYRAMINE 4 G/9G
1 POWDER, FOR SUSPENSION ORAL EVERY 12 HOURS SCHEDULED
Status: DISCONTINUED | OUTPATIENT
Start: 2020-09-16 | End: 2020-09-22 | Stop reason: HOSPADM

## 2020-09-16 RX ORDER — CLONIDINE HYDROCHLORIDE 0.2 MG/1
0.2 TABLET ORAL 3 TIMES DAILY
Status: DISCONTINUED | OUTPATIENT
Start: 2020-09-16 | End: 2020-09-22 | Stop reason: HOSPADM

## 2020-09-16 RX ORDER — SODIUM BICARBONATE 650 MG/1
650 TABLET ORAL 2 TIMES DAILY
Status: DISCONTINUED | OUTPATIENT
Start: 2020-09-16 | End: 2020-09-20

## 2020-09-16 RX ORDER — SODIUM CHLORIDE AND POTASSIUM CHLORIDE 150; 900 MG/100ML; MG/100ML
100 INJECTION, SOLUTION INTRAVENOUS CONTINUOUS
Status: DISCONTINUED | OUTPATIENT
Start: 2020-09-16 | End: 2020-09-16

## 2020-09-16 RX ORDER — ACETAMINOPHEN 325 MG/1
650 TABLET ORAL EVERY 4 HOURS PRN
Status: DISCONTINUED | OUTPATIENT
Start: 2020-09-16 | End: 2020-09-22 | Stop reason: HOSPADM

## 2020-09-16 RX ORDER — CETIRIZINE HYDROCHLORIDE 10 MG/1
10 TABLET ORAL DAILY
Status: DISCONTINUED | OUTPATIENT
Start: 2020-09-16 | End: 2020-09-22 | Stop reason: HOSPADM

## 2020-09-16 RX ORDER — SODIUM CHLORIDE 0.9 % (FLUSH) 0.9 %
10 SYRINGE (ML) INJECTION EVERY 12 HOURS SCHEDULED
Status: DISCONTINUED | OUTPATIENT
Start: 2020-09-16 | End: 2020-09-22 | Stop reason: HOSPADM

## 2020-09-16 RX ORDER — POTASSIUM CHLORIDE 1.5 G/1.77G
40 POWDER, FOR SOLUTION ORAL AS NEEDED
Status: DISCONTINUED | OUTPATIENT
Start: 2020-09-16 | End: 2020-09-16

## 2020-09-16 RX ORDER — POTASSIUM CHLORIDE 7.45 MG/ML
10 INJECTION INTRAVENOUS
Status: DISCONTINUED | OUTPATIENT
Start: 2020-09-16 | End: 2020-09-16

## 2020-09-16 RX ADMIN — IPRATROPIUM BROMIDE 0.5 MG: 0.5 SOLUTION RESPIRATORY (INHALATION) at 14:30

## 2020-09-16 RX ADMIN — AMLODIPINE BESYLATE 10 MG: 10 TABLET ORAL at 09:29

## 2020-09-16 RX ADMIN — MAGNESIUM SULFATE HEPTAHYDRATE 2 G: 40 INJECTION, SOLUTION INTRAVENOUS at 00:29

## 2020-09-16 RX ADMIN — MAGNESIUM SULFATE IN WATER 2 G: 40 INJECTION, SOLUTION INTRAVENOUS at 02:38

## 2020-09-16 RX ADMIN — SODIUM BICARBONATE 150 MEQ: 84 INJECTION, SOLUTION INTRAVENOUS at 17:41

## 2020-09-16 RX ADMIN — SODIUM BICARBONATE 150 MEQ: 84 INJECTION, SOLUTION INTRAVENOUS at 06:43

## 2020-09-16 RX ADMIN — ALBUTEROL SULFATE 1.25 MG: 2.5 SOLUTION RESPIRATORY (INHALATION) at 20:44

## 2020-09-16 RX ADMIN — PANTOPRAZOLE SODIUM 40 MG: 40 TABLET, DELAYED RELEASE ORAL at 05:41

## 2020-09-16 RX ADMIN — POTASSIUM CHLORIDE 40 MEQ: 750 CAPSULE, EXTENDED RELEASE ORAL at 05:41

## 2020-09-16 RX ADMIN — CETIRIZINE HYDROCHLORIDE 10 MG: 10 TABLET, FILM COATED ORAL at 09:29

## 2020-09-16 RX ADMIN — ASPIRIN 81 MG: 81 TABLET ORAL at 09:28

## 2020-09-16 RX ADMIN — MESALAMINE 1.5 G: 375 CAPSULE, EXTENDED RELEASE ORAL at 09:25

## 2020-09-16 RX ADMIN — POTASSIUM CHLORIDE 40 MEQ: 750 CAPSULE, EXTENDED RELEASE ORAL at 02:38

## 2020-09-16 RX ADMIN — SODIUM BICARBONATE 650 MG: 650 TABLET ORAL at 21:15

## 2020-09-16 RX ADMIN — CLONIDINE HYDROCHLORIDE 0.2 MG: 0.2 TABLET ORAL at 09:25

## 2020-09-16 RX ADMIN — ALBUTEROL SULFATE 1.25 MG: 2.5 SOLUTION RESPIRATORY (INHALATION) at 14:30

## 2020-09-16 RX ADMIN — ACETAMINOPHEN 650 MG: 325 TABLET, FILM COATED ORAL at 21:26

## 2020-09-16 RX ADMIN — CHOLESTYRAMINE 1 PACKET: 4 POWDER, FOR SUSPENSION ORAL at 09:23

## 2020-09-16 RX ADMIN — AZELASTINE HYDROCHLORIDE 1 SPRAY: 137 SPRAY, METERED NASAL at 09:24

## 2020-09-16 RX ADMIN — METOPROLOL SUCCINATE 25 MG: 25 TABLET, EXTENDED RELEASE ORAL at 09:25

## 2020-09-16 RX ADMIN — CLONIDINE HYDROCHLORIDE 0.2 MG: 0.2 TABLET ORAL at 15:20

## 2020-09-16 RX ADMIN — IPRATROPIUM BROMIDE 0.5 MG: 0.5 SOLUTION RESPIRATORY (INHALATION) at 20:44

## 2020-09-16 RX ADMIN — CLONIDINE HYDROCHLORIDE 0.2 MG: 0.2 TABLET ORAL at 21:15

## 2020-09-16 RX ADMIN — ALBUTEROL SULFATE 1.25 MG: 2.5 SOLUTION RESPIRATORY (INHALATION) at 10:31

## 2020-09-16 RX ADMIN — CHOLESTYRAMINE 1 PACKET: 4 POWDER, FOR SUSPENSION ORAL at 21:15

## 2020-09-16 RX ADMIN — CEFTRIAXONE SODIUM 1 G: 1 INJECTION, POWDER, FOR SOLUTION INTRAMUSCULAR; INTRAVENOUS at 12:54

## 2020-09-16 RX ADMIN — MAGNESIUM SULFATE IN WATER 2 G: 40 INJECTION, SOLUTION INTRAVENOUS at 04:09

## 2020-09-16 RX ADMIN — METRONIDAZOLE 500 MG: 500 INJECTION, SOLUTION INTRAVENOUS at 21:18

## 2020-09-16 RX ADMIN — IPRATROPIUM BROMIDE 0.5 MG: 0.5 SOLUTION RESPIRATORY (INHALATION) at 10:31

## 2020-09-16 RX ADMIN — METRONIDAZOLE 500 MG: 500 INJECTION, SOLUTION INTRAVENOUS at 13:58

## 2020-09-16 RX ADMIN — SODIUM BICARBONATE 650 MG: 650 TABLET ORAL at 09:28

## 2020-09-16 RX ADMIN — LEVOFLOXACIN 500 MG: 5 INJECTION, SOLUTION INTRAVENOUS at 00:20

## 2020-09-16 RX ADMIN — ACETAMINOPHEN 650 MG: 325 TABLET, FILM COATED ORAL at 03:49

## 2020-09-16 RX ADMIN — POTASSIUM CHLORIDE 20 MEQ: 750 CAPSULE, EXTENDED RELEASE ORAL at 09:33

## 2020-09-16 RX ADMIN — SODIUM CHLORIDE, PRESERVATIVE FREE 10 ML: 5 INJECTION INTRAVENOUS at 09:31

## 2020-09-16 RX ADMIN — POTASSIUM CHLORIDE 40 MEQ: 750 CAPSULE, EXTENDED RELEASE ORAL at 09:29

## 2020-09-16 RX ADMIN — AZELASTINE HYDROCHLORIDE 1 SPRAY: 137 SPRAY, METERED NASAL at 21:18

## 2020-09-16 NOTE — OUTREACH NOTE
Medical Week 4 Survey      Responses   [facility] patient discharged from?  [place]   COVID-19 Test Status  Negative   Does the patient have one of the following disease processes/diagnoses(primary or secondary)?  Other   Week 4 attempt successful?  No   Revoke  Readmitted          Mireya Solomon RN

## 2020-09-17 LAB
ALBUMIN SERPL-MCNC: 2.7 G/DL (ref 3.5–5.2)
ALBUMIN/GLOB SERPL: 0.8 G/DL
ALP SERPL-CCNC: 127 U/L (ref 39–117)
ALT SERPL W P-5'-P-CCNC: 11 U/L (ref 1–41)
ANION GAP SERPL CALCULATED.3IONS-SCNC: 11 MMOL/L (ref 5–15)
AST SERPL-CCNC: 11 U/L (ref 1–40)
BACTERIA SPEC AEROBE CULT: ABNORMAL
BASOPHILS # BLD AUTO: 0.02 10*3/MM3 (ref 0–0.2)
BASOPHILS NFR BLD AUTO: 0.2 % (ref 0–1.5)
BILIRUB SERPL-MCNC: 0.2 MG/DL (ref 0–1.2)
BUN SERPL-MCNC: 34 MG/DL (ref 8–23)
BUN/CREAT SERPL: 11.8 (ref 7–25)
CALCIUM SPEC-SCNC: 8 MG/DL (ref 8.6–10.5)
CHLORIDE SERPL-SCNC: 101 MMOL/L (ref 98–107)
CO2 SERPL-SCNC: 23 MMOL/L (ref 22–29)
CREAT SERPL-MCNC: 2.87 MG/DL (ref 0.76–1.27)
DEPRECATED RDW RBC AUTO: 43.6 FL (ref 37–54)
EOSINOPHIL # BLD AUTO: 0.25 10*3/MM3 (ref 0–0.4)
EOSINOPHIL NFR BLD AUTO: 3.1 % (ref 0.3–6.2)
ERYTHROCYTE [DISTWIDTH] IN BLOOD BY AUTOMATED COUNT: 13.4 % (ref 12.3–15.4)
GFR SERPL CREATININE-BSD FRML MDRD: 22 ML/MIN/1.73
GLOBULIN UR ELPH-MCNC: 3.3 GM/DL
GLUCOSE SERPL-MCNC: 132 MG/DL (ref 65–99)
HBV CORE AB SERPL QL IA: NEGATIVE
HBV E AG SERPL QL IA: NEGATIVE
HBV SURFACE AB SER RIA-ACNC: NORMAL
HCT VFR BLD AUTO: 21.9 % (ref 37.5–51)
HGB BLD-MCNC: 7.4 G/DL (ref 13–17.7)
IMM GRANULOCYTES # BLD AUTO: 0.06 10*3/MM3 (ref 0–0.05)
IMM GRANULOCYTES NFR BLD AUTO: 0.7 % (ref 0–0.5)
LYMPHOCYTES # BLD AUTO: 0.72 10*3/MM3 (ref 0.7–3.1)
LYMPHOCYTES NFR BLD AUTO: 8.8 % (ref 19.6–45.3)
MAGNESIUM SERPL-MCNC: 2.1 MG/DL (ref 1.6–2.4)
MCH RBC QN AUTO: 30.1 PG (ref 26.6–33)
MCHC RBC AUTO-ENTMCNC: 33.8 G/DL (ref 31.5–35.7)
MCV RBC AUTO: 89 FL (ref 79–97)
MONOCYTES # BLD AUTO: 0.62 10*3/MM3 (ref 0.1–0.9)
MONOCYTES NFR BLD AUTO: 7.6 % (ref 5–12)
NEUTROPHILS NFR BLD AUTO: 6.5 10*3/MM3 (ref 1.7–7)
NEUTROPHILS NFR BLD AUTO: 79.6 % (ref 42.7–76)
NRBC BLD AUTO-RTO: 0 /100 WBC (ref 0–0.2)
PHOSPHATE SERPL-MCNC: 1.9 MG/DL (ref 2.5–4.5)
PLATELET # BLD AUTO: 103 10*3/MM3 (ref 140–450)
PMV BLD AUTO: 10.5 FL (ref 6–12)
POTASSIUM SERPL-SCNC: 2.8 MMOL/L (ref 3.5–5.2)
PROT SERPL-MCNC: 6 G/DL (ref 6–8.5)
RBC # BLD AUTO: 2.46 10*6/MM3 (ref 4.14–5.8)
SODIUM SERPL-SCNC: 135 MMOL/L (ref 136–145)
WBC # BLD AUTO: 8.17 10*3/MM3 (ref 3.4–10.8)

## 2020-09-17 PROCEDURE — 84100 ASSAY OF PHOSPHORUS: CPT | Performed by: FAMILY MEDICINE

## 2020-09-17 PROCEDURE — 97110 THERAPEUTIC EXERCISES: CPT

## 2020-09-17 PROCEDURE — 25010000002 IRON SUCROSE PER 1 MG: Performed by: INTERNAL MEDICINE

## 2020-09-17 PROCEDURE — 97535 SELF CARE MNGMENT TRAINING: CPT

## 2020-09-17 PROCEDURE — 94799 UNLISTED PULMONARY SVC/PX: CPT

## 2020-09-17 PROCEDURE — 83735 ASSAY OF MAGNESIUM: CPT | Performed by: FAMILY MEDICINE

## 2020-09-17 PROCEDURE — 99232 SBSQ HOSP IP/OBS MODERATE 35: CPT | Performed by: NURSE PRACTITIONER

## 2020-09-17 PROCEDURE — 25010000002 CEFTRIAXONE PER 250 MG: Performed by: FAMILY MEDICINE

## 2020-09-17 PROCEDURE — 80053 COMPREHEN METABOLIC PANEL: CPT | Performed by: FAMILY MEDICINE

## 2020-09-17 PROCEDURE — 85025 COMPLETE CBC W/AUTO DIFF WBC: CPT | Performed by: FAMILY MEDICINE

## 2020-09-17 PROCEDURE — 97116 GAIT TRAINING THERAPY: CPT

## 2020-09-17 RX ORDER — CEFDINIR 300 MG/1
300 CAPSULE ORAL
Status: DISCONTINUED | OUTPATIENT
Start: 2020-09-18 | End: 2020-09-18

## 2020-09-17 RX ORDER — SODIUM CHLORIDE, SODIUM LACTATE, POTASSIUM CHLORIDE, CALCIUM CHLORIDE 600; 310; 30; 20 MG/100ML; MG/100ML; MG/100ML; MG/100ML
75 INJECTION, SOLUTION INTRAVENOUS CONTINUOUS
Status: DISCONTINUED | OUTPATIENT
Start: 2020-09-17 | End: 2020-09-18

## 2020-09-17 RX ADMIN — IPRATROPIUM BROMIDE 0.5 MG: 0.5 SOLUTION RESPIRATORY (INHALATION) at 19:30

## 2020-09-17 RX ADMIN — IPRATROPIUM BROMIDE 0.5 MG: 0.5 SOLUTION RESPIRATORY (INHALATION) at 14:42

## 2020-09-17 RX ADMIN — ALBUTEROL SULFATE 1.25 MG: 2.5 SOLUTION RESPIRATORY (INHALATION) at 14:42

## 2020-09-17 RX ADMIN — PANTOPRAZOLE SODIUM 40 MG: 40 TABLET, DELAYED RELEASE ORAL at 05:47

## 2020-09-17 RX ADMIN — POTASSIUM CHLORIDE 20 MEQ: 750 CAPSULE, EXTENDED RELEASE ORAL at 11:30

## 2020-09-17 RX ADMIN — SODIUM BICARBONATE 150 MEQ: 84 INJECTION, SOLUTION INTRAVENOUS at 05:47

## 2020-09-17 RX ADMIN — CLONIDINE HYDROCHLORIDE 0.2 MG: 0.2 TABLET ORAL at 20:36

## 2020-09-17 RX ADMIN — METRONIDAZOLE 500 MG: 500 INJECTION, SOLUTION INTRAVENOUS at 05:47

## 2020-09-17 RX ADMIN — ALBUTEROL SULFATE 1.25 MG: 2.5 SOLUTION RESPIRATORY (INHALATION) at 07:03

## 2020-09-17 RX ADMIN — CLONIDINE HYDROCHLORIDE 0.2 MG: 0.2 TABLET ORAL at 11:30

## 2020-09-17 RX ADMIN — ASPIRIN 81 MG: 81 TABLET ORAL at 11:29

## 2020-09-17 RX ADMIN — AZELASTINE HYDROCHLORIDE 1 SPRAY: 137 SPRAY, METERED NASAL at 20:36

## 2020-09-17 RX ADMIN — ALBUTEROL SULFATE 1.25 MG: 2.5 SOLUTION RESPIRATORY (INHALATION) at 10:57

## 2020-09-17 RX ADMIN — CETIRIZINE HYDROCHLORIDE 10 MG: 10 TABLET, FILM COATED ORAL at 11:30

## 2020-09-17 RX ADMIN — SODIUM CHLORIDE, POTASSIUM CHLORIDE, SODIUM LACTATE AND CALCIUM CHLORIDE 125 ML/HR: 600; 310; 30; 20 INJECTION, SOLUTION INTRAVENOUS at 11:32

## 2020-09-17 RX ADMIN — CHOLESTYRAMINE 1 PACKET: 4 POWDER, FOR SUSPENSION ORAL at 11:29

## 2020-09-17 RX ADMIN — SODIUM BICARBONATE 650 MG: 650 TABLET ORAL at 11:30

## 2020-09-17 RX ADMIN — MESALAMINE 1.5 G: 375 CAPSULE, EXTENDED RELEASE ORAL at 11:30

## 2020-09-17 RX ADMIN — AZELASTINE HYDROCHLORIDE 1 SPRAY: 137 SPRAY, METERED NASAL at 11:29

## 2020-09-17 RX ADMIN — AMLODIPINE BESYLATE 10 MG: 10 TABLET ORAL at 11:30

## 2020-09-17 RX ADMIN — SODIUM CHLORIDE, PRESERVATIVE FREE 10 ML: 5 INJECTION INTRAVENOUS at 11:31

## 2020-09-17 RX ADMIN — METOPROLOL SUCCINATE 25 MG: 25 TABLET, EXTENDED RELEASE ORAL at 11:30

## 2020-09-17 RX ADMIN — CHOLESTYRAMINE 1 PACKET: 4 POWDER, FOR SUSPENSION ORAL at 20:37

## 2020-09-17 RX ADMIN — POTASSIUM PHOSPHATE, MONOBASIC AND POTASSIUM PHOSPHATE, DIBASIC 30 MMOL: 224; 236 INJECTION, SOLUTION, CONCENTRATE INTRAVENOUS at 06:15

## 2020-09-17 RX ADMIN — IPRATROPIUM BROMIDE 0.5 MG: 0.5 SOLUTION RESPIRATORY (INHALATION) at 10:57

## 2020-09-17 RX ADMIN — ALBUTEROL SULFATE 1.25 MG: 2.5 SOLUTION RESPIRATORY (INHALATION) at 19:30

## 2020-09-17 RX ADMIN — SODIUM CHLORIDE, PRESERVATIVE FREE 10 ML: 5 INJECTION INTRAVENOUS at 20:37

## 2020-09-17 RX ADMIN — SODIUM BICARBONATE 650 MG: 650 TABLET ORAL at 20:37

## 2020-09-17 RX ADMIN — IRON SUCROSE 200 MG: 20 INJECTION, SOLUTION INTRAVENOUS at 20:36

## 2020-09-17 RX ADMIN — CEFTRIAXONE SODIUM 1 G: 1 INJECTION, POWDER, FOR SOLUTION INTRAMUSCULAR; INTRAVENOUS at 16:43

## 2020-09-17 RX ADMIN — IPRATROPIUM BROMIDE 0.5 MG: 0.5 SOLUTION RESPIRATORY (INHALATION) at 07:03

## 2020-09-17 RX ADMIN — METRONIDAZOLE 500 MG: 500 INJECTION, SOLUTION INTRAVENOUS at 18:00

## 2020-09-18 LAB
ABO GROUP BLD: NORMAL
ALBUMIN SERPL-MCNC: 2.5 G/DL (ref 3.5–5.2)
ALBUMIN/GLOB SERPL: 0.7 G/DL
ALP SERPL-CCNC: 122 U/L (ref 39–117)
ALT SERPL W P-5'-P-CCNC: 9 U/L (ref 1–41)
ANION GAP SERPL CALCULATED.3IONS-SCNC: 11 MMOL/L (ref 5–15)
AST SERPL-CCNC: 12 U/L (ref 1–40)
BASOPHILS # BLD AUTO: 0.02 10*3/MM3 (ref 0–0.2)
BASOPHILS NFR BLD AUTO: 0.3 % (ref 0–1.5)
BILIRUB SERPL-MCNC: <0.2 MG/DL (ref 0–1.2)
BLD GP AB SCN SERPL QL: NEGATIVE
BUN SERPL-MCNC: 34 MG/DL (ref 8–23)
BUN/CREAT SERPL: 13 (ref 7–25)
CALCIUM SPEC-SCNC: 8 MG/DL (ref 8.6–10.5)
CHLORIDE SERPL-SCNC: 101 MMOL/L (ref 98–107)
CO2 SERPL-SCNC: 24 MMOL/L (ref 22–29)
CREAT SERPL-MCNC: 2.61 MG/DL (ref 0.76–1.27)
DEPRECATED RDW RBC AUTO: 45 FL (ref 37–54)
EOSINOPHIL # BLD AUTO: 0.2 10*3/MM3 (ref 0–0.4)
EOSINOPHIL NFR BLD AUTO: 2.9 % (ref 0.3–6.2)
ERYTHROCYTE [DISTWIDTH] IN BLOOD BY AUTOMATED COUNT: 13.6 % (ref 12.3–15.4)
GFR SERPL CREATININE-BSD FRML MDRD: 24 ML/MIN/1.73
GLOBULIN UR ELPH-MCNC: 3.4 GM/DL
GLUCOSE SERPL-MCNC: 116 MG/DL (ref 65–99)
HCT VFR BLD AUTO: 20.6 % (ref 37.5–51)
HGB BLD-MCNC: 7 G/DL (ref 13–17.7)
IMM GRANULOCYTES # BLD AUTO: 0.03 10*3/MM3 (ref 0–0.05)
IMM GRANULOCYTES NFR BLD AUTO: 0.4 % (ref 0–0.5)
LYMPHOCYTES # BLD AUTO: 0.87 10*3/MM3 (ref 0.7–3.1)
LYMPHOCYTES NFR BLD AUTO: 12.5 % (ref 19.6–45.3)
MAGNESIUM SERPL-MCNC: 1.7 MG/DL (ref 1.6–2.4)
MCH RBC QN AUTO: 30.8 PG (ref 26.6–33)
MCHC RBC AUTO-ENTMCNC: 34 G/DL (ref 31.5–35.7)
MCV RBC AUTO: 90.7 FL (ref 79–97)
MONOCYTES # BLD AUTO: 0.61 10*3/MM3 (ref 0.1–0.9)
MONOCYTES NFR BLD AUTO: 8.8 % (ref 5–12)
NEUTROPHILS NFR BLD AUTO: 5.24 10*3/MM3 (ref 1.7–7)
NEUTROPHILS NFR BLD AUTO: 75.1 % (ref 42.7–76)
NRBC BLD AUTO-RTO: 0 /100 WBC (ref 0–0.2)
PHOSPHATE SERPL-MCNC: 3.3 MG/DL (ref 2.5–4.5)
PLATELET # BLD AUTO: 100 10*3/MM3 (ref 140–450)
PMV BLD AUTO: 10.4 FL (ref 6–12)
POTASSIUM SERPL-SCNC: 3 MMOL/L (ref 3.5–5.2)
PROCALCITONIN SERPL-MCNC: 8.63 NG/ML (ref 0–0.25)
PROT SERPL-MCNC: 5.9 G/DL (ref 6–8.5)
RBC # BLD AUTO: 2.27 10*6/MM3 (ref 4.14–5.8)
RH BLD: NEGATIVE
SARS-COV-2 RNA RESP QL NAA+PROBE: NOT DETECTED
SODIUM SERPL-SCNC: 136 MMOL/L (ref 136–145)
T&S EXPIRATION DATE: NORMAL
TSPOT INTERPRETATION: NEGATIVE
TSPOT NIL CONTROL INTERPRETATION: NORMAL
TSPOT PANEL A: 0
TSPOT PANEL B: 0
TSPOT POS CONTROL INTERPRETATION: NORMAL
WBC # BLD AUTO: 6.97 10*3/MM3 (ref 3.4–10.8)

## 2020-09-18 PROCEDURE — 87040 BLOOD CULTURE FOR BACTERIA: CPT | Performed by: FAMILY MEDICINE

## 2020-09-18 PROCEDURE — 86850 RBC ANTIBODY SCREEN: CPT | Performed by: FAMILY MEDICINE

## 2020-09-18 PROCEDURE — P9016 RBC LEUKOCYTES REDUCED: HCPCS

## 2020-09-18 PROCEDURE — 86900 BLOOD TYPING SEROLOGIC ABO: CPT | Performed by: FAMILY MEDICINE

## 2020-09-18 PROCEDURE — 99232 SBSQ HOSP IP/OBS MODERATE 35: CPT | Performed by: NURSE PRACTITIONER

## 2020-09-18 PROCEDURE — 85025 COMPLETE CBC W/AUTO DIFF WBC: CPT | Performed by: FAMILY MEDICINE

## 2020-09-18 PROCEDURE — 25010000003 MAGNESIUM SULFATE 4 GM/100ML SOLUTION: Performed by: FAMILY MEDICINE

## 2020-09-18 PROCEDURE — 86920 COMPATIBILITY TEST SPIN: CPT

## 2020-09-18 PROCEDURE — 84145 PROCALCITONIN (PCT): CPT | Performed by: FAMILY MEDICINE

## 2020-09-18 PROCEDURE — 94799 UNLISTED PULMONARY SVC/PX: CPT

## 2020-09-18 PROCEDURE — 25010000002 IRON SUCROSE PER 1 MG: Performed by: INTERNAL MEDICINE

## 2020-09-18 PROCEDURE — 84100 ASSAY OF PHOSPHORUS: CPT | Performed by: FAMILY MEDICINE

## 2020-09-18 PROCEDURE — 83735 ASSAY OF MAGNESIUM: CPT | Performed by: FAMILY MEDICINE

## 2020-09-18 PROCEDURE — 97535 SELF CARE MNGMENT TRAINING: CPT

## 2020-09-18 PROCEDURE — 86901 BLOOD TYPING SEROLOGIC RH(D): CPT | Performed by: FAMILY MEDICINE

## 2020-09-18 PROCEDURE — 80053 COMPREHEN METABOLIC PANEL: CPT | Performed by: FAMILY MEDICINE

## 2020-09-18 PROCEDURE — 36430 TRANSFUSION BLD/BLD COMPNT: CPT

## 2020-09-18 PROCEDURE — 87635 SARS-COV-2 COVID-19 AMP PRB: CPT | Performed by: FAMILY MEDICINE

## 2020-09-18 PROCEDURE — 25010000002 CEFTRIAXONE PER 250 MG: Performed by: FAMILY MEDICINE

## 2020-09-18 PROCEDURE — 86900 BLOOD TYPING SEROLOGIC ABO: CPT

## 2020-09-18 RX ORDER — CHOLESTYRAMINE 4 G/9G
1 POWDER, FOR SUSPENSION ORAL EVERY 12 HOURS SCHEDULED
Qty: 60 PACKET | Refills: 2 | Status: SHIPPED | OUTPATIENT
Start: 2020-09-18 | End: 2020-12-14 | Stop reason: SDUPTHER

## 2020-09-18 RX ORDER — POTASSIUM CHLORIDE 750 MG/1
40 CAPSULE, EXTENDED RELEASE ORAL 2 TIMES DAILY WITH MEALS
Status: DISCONTINUED | OUTPATIENT
Start: 2020-09-18 | End: 2020-09-21

## 2020-09-18 RX ORDER — CEFDINIR 300 MG/1
300 CAPSULE ORAL
Qty: 4 CAPSULE | Refills: 0 | Status: SHIPPED | OUTPATIENT
Start: 2020-09-19 | End: 2020-09-22 | Stop reason: HOSPADM

## 2020-09-18 RX ORDER — MAGNESIUM SULFATE HEPTAHYDRATE 40 MG/ML
4 INJECTION, SOLUTION INTRAVENOUS ONCE
Status: COMPLETED | OUTPATIENT
Start: 2020-09-18 | End: 2020-09-18

## 2020-09-18 RX ORDER — VALSARTAN 160 MG/1
160 TABLET ORAL DAILY
COMMUNITY
End: 2020-09-22 | Stop reason: HOSPADM

## 2020-09-18 RX ADMIN — POTASSIUM CHLORIDE 40 MEQ: 750 CAPSULE, EXTENDED RELEASE ORAL at 09:02

## 2020-09-18 RX ADMIN — SODIUM CHLORIDE, PRESERVATIVE FREE 10 ML: 5 INJECTION INTRAVENOUS at 09:02

## 2020-09-18 RX ADMIN — SODIUM BICARBONATE 650 MG: 650 TABLET ORAL at 09:02

## 2020-09-18 RX ADMIN — IRON SUCROSE 200 MG: 20 INJECTION, SOLUTION INTRAVENOUS at 21:47

## 2020-09-18 RX ADMIN — CEFTRIAXONE SODIUM 1 G: 1 INJECTION, POWDER, FOR SOLUTION INTRAMUSCULAR; INTRAVENOUS at 17:17

## 2020-09-18 RX ADMIN — POTASSIUM CHLORIDE 40 MEQ: 750 CAPSULE, EXTENDED RELEASE ORAL at 21:48

## 2020-09-18 RX ADMIN — SODIUM CHLORIDE, PRESERVATIVE FREE 10 ML: 5 INJECTION INTRAVENOUS at 21:48

## 2020-09-18 RX ADMIN — IPRATROPIUM BROMIDE 0.5 MG: 0.5 SOLUTION RESPIRATORY (INHALATION) at 06:24

## 2020-09-18 RX ADMIN — METOPROLOL SUCCINATE 25 MG: 25 TABLET, EXTENDED RELEASE ORAL at 09:02

## 2020-09-18 RX ADMIN — SODIUM BICARBONATE 650 MG: 650 TABLET ORAL at 21:48

## 2020-09-18 RX ADMIN — CHOLESTYRAMINE 1 PACKET: 4 POWDER, FOR SUSPENSION ORAL at 21:48

## 2020-09-18 RX ADMIN — CETIRIZINE HYDROCHLORIDE 10 MG: 10 TABLET, FILM COATED ORAL at 09:02

## 2020-09-18 RX ADMIN — PANTOPRAZOLE SODIUM 40 MG: 40 TABLET, DELAYED RELEASE ORAL at 06:16

## 2020-09-18 RX ADMIN — AMLODIPINE BESYLATE 10 MG: 10 TABLET ORAL at 09:02

## 2020-09-18 RX ADMIN — AZELASTINE HYDROCHLORIDE 1 SPRAY: 137 SPRAY, METERED NASAL at 21:48

## 2020-09-18 RX ADMIN — ACETAMINOPHEN 650 MG: 325 TABLET, FILM COATED ORAL at 17:14

## 2020-09-18 RX ADMIN — ASPIRIN 81 MG: 81 TABLET ORAL at 09:02

## 2020-09-18 RX ADMIN — CLONIDINE HYDROCHLORIDE 0.2 MG: 0.2 TABLET ORAL at 21:48

## 2020-09-18 RX ADMIN — MAGNESIUM SULFATE HEPTAHYDRATE 4 G: 40 INJECTION, SOLUTION INTRAVENOUS at 14:30

## 2020-09-18 RX ADMIN — CEFDINIR 300 MG: 300 CAPSULE ORAL at 09:02

## 2020-09-18 RX ADMIN — CLONIDINE HYDROCHLORIDE 0.2 MG: 0.2 TABLET ORAL at 09:02

## 2020-09-18 RX ADMIN — AZELASTINE HYDROCHLORIDE 1 SPRAY: 137 SPRAY, METERED NASAL at 09:02

## 2020-09-18 RX ADMIN — ALBUTEROL SULFATE 1.25 MG: 2.5 SOLUTION RESPIRATORY (INHALATION) at 06:24

## 2020-09-18 RX ADMIN — SODIUM CHLORIDE, POTASSIUM CHLORIDE, SODIUM LACTATE AND CALCIUM CHLORIDE 125 ML/HR: 600; 310; 30; 20 INJECTION, SOLUTION INTRAVENOUS at 05:33

## 2020-09-18 RX ADMIN — MESALAMINE 1.5 G: 375 CAPSULE, EXTENDED RELEASE ORAL at 09:02

## 2020-09-19 LAB
ALBUMIN SERPL-MCNC: 2.8 G/DL (ref 3.5–5.2)
ALBUMIN/GLOB SERPL: 0.8 G/DL
ALP SERPL-CCNC: 167 U/L (ref 39–117)
ALT SERPL W P-5'-P-CCNC: 9 U/L (ref 1–41)
ANION GAP SERPL CALCULATED.3IONS-SCNC: 11 MMOL/L (ref 5–15)
AST SERPL-CCNC: 14 U/L (ref 1–40)
BACTERIA UR QL AUTO: ABNORMAL /HPF
BASOPHILS # BLD AUTO: 0.06 10*3/MM3 (ref 0–0.2)
BASOPHILS NFR BLD AUTO: 0.4 % (ref 0–1.5)
BILIRUB SERPL-MCNC: 0.4 MG/DL (ref 0–1.2)
BILIRUB UR QL STRIP: NEGATIVE
BUN SERPL-MCNC: 28 MG/DL (ref 8–23)
BUN/CREAT SERPL: 10.8 (ref 7–25)
CALCIUM SPEC-SCNC: 8.8 MG/DL (ref 8.6–10.5)
CHLORIDE SERPL-SCNC: 107 MMOL/L (ref 98–107)
CLARITY UR: CLEAR
CO2 SERPL-SCNC: 20 MMOL/L (ref 22–29)
COLOR UR: YELLOW
CREAT SERPL-MCNC: 2.6 MG/DL (ref 0.76–1.27)
DEPRECATED RDW RBC AUTO: 53.8 FL (ref 37–54)
EOSINOPHIL # BLD AUTO: 0.24 10*3/MM3 (ref 0–0.4)
EOSINOPHIL NFR BLD AUTO: 1.7 % (ref 0.3–6.2)
ERYTHROCYTE [DISTWIDTH] IN BLOOD BY AUTOMATED COUNT: 15.9 % (ref 12.3–15.4)
GFR SERPL CREATININE-BSD FRML MDRD: 24 ML/MIN/1.73
GLOBULIN UR ELPH-MCNC: 3.5 GM/DL
GLUCOSE SERPL-MCNC: 96 MG/DL (ref 65–99)
GLUCOSE UR STRIP-MCNC: NEGATIVE MG/DL
HCT VFR BLD AUTO: 25.6 % (ref 37.5–51)
HGB BLD-MCNC: 8.5 G/DL (ref 13–17.7)
HGB UR QL STRIP.AUTO: NEGATIVE
HYALINE CASTS UR QL AUTO: ABNORMAL /LPF
IMM GRANULOCYTES # BLD AUTO: 0.12 10*3/MM3 (ref 0–0.05)
IMM GRANULOCYTES NFR BLD AUTO: 0.9 % (ref 0–0.5)
KETONES UR QL STRIP: NEGATIVE
LEUKOCYTE ESTERASE UR QL STRIP.AUTO: ABNORMAL
LYMPHOCYTES # BLD AUTO: 0.55 10*3/MM3 (ref 0.7–3.1)
LYMPHOCYTES NFR BLD AUTO: 4 % (ref 19.6–45.3)
MAGNESIUM SERPL-MCNC: 2.1 MG/DL (ref 1.6–2.4)
MCH RBC QN AUTO: 30.2 PG (ref 26.6–33)
MCHC RBC AUTO-ENTMCNC: 33.2 G/DL (ref 31.5–35.7)
MCV RBC AUTO: 91.1 FL (ref 79–97)
MONOCYTES # BLD AUTO: 0.68 10*3/MM3 (ref 0.1–0.9)
MONOCYTES NFR BLD AUTO: 4.9 % (ref 5–12)
NEUTROPHILS NFR BLD AUTO: 12.11 10*3/MM3 (ref 1.7–7)
NEUTROPHILS NFR BLD AUTO: 88.1 % (ref 42.7–76)
NITRITE UR QL STRIP: NEGATIVE
NRBC BLD AUTO-RTO: 0 /100 WBC (ref 0–0.2)
PH UR STRIP.AUTO: 6.5 [PH] (ref 5–8)
PLATELET # BLD AUTO: 116 10*3/MM3 (ref 140–450)
PMV BLD AUTO: 10.6 FL (ref 6–12)
POTASSIUM SERPL-SCNC: 3.9 MMOL/L (ref 3.5–5.2)
PROT SERPL-MCNC: 6.3 G/DL (ref 6–8.5)
PROT UR QL STRIP: ABNORMAL
RBC # BLD AUTO: 2.81 10*6/MM3 (ref 4.14–5.8)
RBC # UR: ABNORMAL /HPF
REF LAB TEST METHOD: ABNORMAL
SODIUM SERPL-SCNC: 138 MMOL/L (ref 136–145)
SP GR UR STRIP: 1.01 (ref 1–1.03)
SQUAMOUS #/AREA URNS HPF: ABNORMAL /HPF
UROBILINOGEN UR QL STRIP: ABNORMAL
WBC # BLD AUTO: 13.76 10*3/MM3 (ref 3.4–10.8)
WBC UR QL AUTO: ABNORMAL /HPF

## 2020-09-19 PROCEDURE — 25010000002 CEFTRIAXONE PER 250 MG: Performed by: FAMILY MEDICINE

## 2020-09-19 PROCEDURE — 99232 SBSQ HOSP IP/OBS MODERATE 35: CPT | Performed by: INTERNAL MEDICINE

## 2020-09-19 PROCEDURE — 85025 COMPLETE CBC W/AUTO DIFF WBC: CPT | Performed by: FAMILY MEDICINE

## 2020-09-19 PROCEDURE — 87086 URINE CULTURE/COLONY COUNT: CPT | Performed by: FAMILY MEDICINE

## 2020-09-19 PROCEDURE — 81001 URINALYSIS AUTO W/SCOPE: CPT | Performed by: FAMILY MEDICINE

## 2020-09-19 PROCEDURE — 25010000002 IRON SUCROSE PER 1 MG: Performed by: INTERNAL MEDICINE

## 2020-09-19 PROCEDURE — 83735 ASSAY OF MAGNESIUM: CPT | Performed by: FAMILY MEDICINE

## 2020-09-19 PROCEDURE — 80053 COMPREHEN METABOLIC PANEL: CPT | Performed by: INTERNAL MEDICINE

## 2020-09-19 RX ADMIN — POTASSIUM CHLORIDE 40 MEQ: 750 CAPSULE, EXTENDED RELEASE ORAL at 17:34

## 2020-09-19 RX ADMIN — SODIUM CHLORIDE, PRESERVATIVE FREE 10 ML: 5 INJECTION INTRAVENOUS at 09:27

## 2020-09-19 RX ADMIN — CHOLESTYRAMINE 1 PACKET: 4 POWDER, FOR SUSPENSION ORAL at 21:36

## 2020-09-19 RX ADMIN — SODIUM BICARBONATE 650 MG: 650 TABLET ORAL at 09:26

## 2020-09-19 RX ADMIN — CEFTRIAXONE SODIUM 1 G: 1 INJECTION, POWDER, FOR SOLUTION INTRAMUSCULAR; INTRAVENOUS at 16:16

## 2020-09-19 RX ADMIN — ASPIRIN 81 MG: 81 TABLET ORAL at 09:26

## 2020-09-19 RX ADMIN — CLONIDINE HYDROCHLORIDE 0.2 MG: 0.2 TABLET ORAL at 21:36

## 2020-09-19 RX ADMIN — SODIUM BICARBONATE 650 MG: 650 TABLET ORAL at 21:36

## 2020-09-19 RX ADMIN — IRON SUCROSE 200 MG: 20 INJECTION, SOLUTION INTRAVENOUS at 15:09

## 2020-09-19 RX ADMIN — AMLODIPINE BESYLATE 10 MG: 10 TABLET ORAL at 09:26

## 2020-09-19 RX ADMIN — POTASSIUM CHLORIDE 40 MEQ: 750 CAPSULE, EXTENDED RELEASE ORAL at 09:26

## 2020-09-19 RX ADMIN — CHOLESTYRAMINE 1 PACKET: 4 POWDER, FOR SUSPENSION ORAL at 09:26

## 2020-09-19 RX ADMIN — MESALAMINE 1.5 G: 375 CAPSULE, EXTENDED RELEASE ORAL at 09:26

## 2020-09-19 RX ADMIN — CLONIDINE HYDROCHLORIDE 0.2 MG: 0.2 TABLET ORAL at 15:09

## 2020-09-19 RX ADMIN — PANTOPRAZOLE SODIUM 40 MG: 40 TABLET, DELAYED RELEASE ORAL at 04:55

## 2020-09-19 RX ADMIN — CLONIDINE HYDROCHLORIDE 0.2 MG: 0.2 TABLET ORAL at 09:26

## 2020-09-19 RX ADMIN — SODIUM CHLORIDE, PRESERVATIVE FREE 10 ML: 5 INJECTION INTRAVENOUS at 21:37

## 2020-09-19 RX ADMIN — CETIRIZINE HYDROCHLORIDE 10 MG: 10 TABLET, FILM COATED ORAL at 09:26

## 2020-09-19 RX ADMIN — METOPROLOL SUCCINATE 25 MG: 25 TABLET, EXTENDED RELEASE ORAL at 09:26

## 2020-09-20 LAB
BACTERIA SPEC AEROBE CULT: NO GROWTH
BACTERIA SPEC AEROBE CULT: NORMAL
BACTERIA SPEC AEROBE CULT: NORMAL
BASOPHILS # BLD AUTO: 0.04 10*3/MM3 (ref 0–0.2)
BASOPHILS NFR BLD AUTO: 0.3 % (ref 0–1.5)
BH BB BLOOD EXPIRATION DATE: NORMAL
BH BB BLOOD TYPE BARCODE: 9500
BH BB DISPENSE STATUS: NORMAL
BH BB PRODUCT CODE: NORMAL
BH BB UNIT NUMBER: NORMAL
CROSSMATCH INTERPRETATION: NORMAL
DEPRECATED RDW RBC AUTO: 55.5 FL (ref 37–54)
EOSINOPHIL # BLD AUTO: 0.58 10*3/MM3 (ref 0–0.4)
EOSINOPHIL NFR BLD AUTO: 5 % (ref 0.3–6.2)
ERYTHROCYTE [DISTWIDTH] IN BLOOD BY AUTOMATED COUNT: 16.3 % (ref 12.3–15.4)
GLUCOSE BLDC GLUCOMTR-MCNC: 141 MG/DL (ref 70–130)
HCT VFR BLD AUTO: 26.2 % (ref 37.5–51)
HGB BLD-MCNC: 8.6 G/DL (ref 13–17.7)
IMM GRANULOCYTES # BLD AUTO: 0.11 10*3/MM3 (ref 0–0.05)
IMM GRANULOCYTES NFR BLD AUTO: 0.9 % (ref 0–0.5)
LYMPHOCYTES # BLD AUTO: 1.12 10*3/MM3 (ref 0.7–3.1)
LYMPHOCYTES NFR BLD AUTO: 9.7 % (ref 19.6–45.3)
MAGNESIUM SERPL-MCNC: 1.8 MG/DL (ref 1.6–2.4)
MCH RBC QN AUTO: 30.3 PG (ref 26.6–33)
MCHC RBC AUTO-ENTMCNC: 32.8 G/DL (ref 31.5–35.7)
MCV RBC AUTO: 92.3 FL (ref 79–97)
MONOCYTES # BLD AUTO: 0.82 10*3/MM3 (ref 0.1–0.9)
MONOCYTES NFR BLD AUTO: 7.1 % (ref 5–12)
NEUTROPHILS NFR BLD AUTO: 77 % (ref 42.7–76)
NEUTROPHILS NFR BLD AUTO: 8.91 10*3/MM3 (ref 1.7–7)
NRBC BLD AUTO-RTO: 0 /100 WBC (ref 0–0.2)
PLATELET # BLD AUTO: 114 10*3/MM3 (ref 140–450)
PMV BLD AUTO: 11.1 FL (ref 6–12)
RBC # BLD AUTO: 2.84 10*6/MM3 (ref 4.14–5.8)
UNIT  ABO: NORMAL
UNIT  RH: NORMAL
WBC # BLD AUTO: 11.58 10*3/MM3 (ref 3.4–10.8)

## 2020-09-20 PROCEDURE — 97116 GAIT TRAINING THERAPY: CPT

## 2020-09-20 PROCEDURE — 97110 THERAPEUTIC EXERCISES: CPT

## 2020-09-20 PROCEDURE — 82962 GLUCOSE BLOOD TEST: CPT

## 2020-09-20 PROCEDURE — 25010000002 IRON SUCROSE PER 1 MG: Performed by: INTERNAL MEDICINE

## 2020-09-20 PROCEDURE — 85025 COMPLETE CBC W/AUTO DIFF WBC: CPT | Performed by: FAMILY MEDICINE

## 2020-09-20 PROCEDURE — 25010000003 MAGNESIUM SULFATE 4 GM/100ML SOLUTION: Performed by: FAMILY MEDICINE

## 2020-09-20 PROCEDURE — 83735 ASSAY OF MAGNESIUM: CPT | Performed by: FAMILY MEDICINE

## 2020-09-20 RX ORDER — IPRATROPIUM BROMIDE AND ALBUTEROL SULFATE 2.5; .5 MG/3ML; MG/3ML
3 SOLUTION RESPIRATORY (INHALATION) EVERY 4 HOURS PRN
Status: DISCONTINUED | OUTPATIENT
Start: 2020-09-20 | End: 2020-09-21

## 2020-09-20 RX ORDER — LEVOFLOXACIN 500 MG/1
500 TABLET, FILM COATED ORAL EVERY 24 HOURS
Status: DISCONTINUED | OUTPATIENT
Start: 2020-09-20 | End: 2020-09-22 | Stop reason: HOSPADM

## 2020-09-20 RX ORDER — SODIUM BICARBONATE 650 MG/1
650 TABLET ORAL 3 TIMES DAILY
Status: DISCONTINUED | OUTPATIENT
Start: 2020-09-20 | End: 2020-09-22 | Stop reason: HOSPADM

## 2020-09-20 RX ADMIN — AZELASTINE HYDROCHLORIDE 1 SPRAY: 137 SPRAY, METERED NASAL at 21:29

## 2020-09-20 RX ADMIN — IRON SUCROSE 200 MG: 20 INJECTION, SOLUTION INTRAVENOUS at 17:16

## 2020-09-20 RX ADMIN — LEVOFLOXACIN 500 MG: 500 TABLET, FILM COATED ORAL at 17:21

## 2020-09-20 RX ADMIN — POTASSIUM CHLORIDE 40 MEQ: 750 CAPSULE, EXTENDED RELEASE ORAL at 17:16

## 2020-09-20 RX ADMIN — AMLODIPINE BESYLATE 10 MG: 10 TABLET ORAL at 08:41

## 2020-09-20 RX ADMIN — SODIUM CHLORIDE, PRESERVATIVE FREE 10 ML: 5 INJECTION INTRAVENOUS at 21:29

## 2020-09-20 RX ADMIN — CLONIDINE HYDROCHLORIDE 0.2 MG: 0.2 TABLET ORAL at 17:16

## 2020-09-20 RX ADMIN — CLONIDINE HYDROCHLORIDE 0.2 MG: 0.2 TABLET ORAL at 08:41

## 2020-09-20 RX ADMIN — CHOLESTYRAMINE 1 PACKET: 4 POWDER, FOR SUSPENSION ORAL at 21:28

## 2020-09-20 RX ADMIN — PANTOPRAZOLE SODIUM 40 MG: 40 TABLET, DELAYED RELEASE ORAL at 05:50

## 2020-09-20 RX ADMIN — CETIRIZINE HYDROCHLORIDE 10 MG: 10 TABLET, FILM COATED ORAL at 08:41

## 2020-09-20 RX ADMIN — CLONIDINE HYDROCHLORIDE 0.2 MG: 0.2 TABLET ORAL at 21:28

## 2020-09-20 RX ADMIN — SODIUM CHLORIDE, PRESERVATIVE FREE 10 ML: 5 INJECTION INTRAVENOUS at 08:41

## 2020-09-20 RX ADMIN — MESALAMINE 1.5 G: 375 CAPSULE, EXTENDED RELEASE ORAL at 08:41

## 2020-09-20 RX ADMIN — SODIUM BICARBONATE 650 MG: 650 TABLET ORAL at 21:28

## 2020-09-20 RX ADMIN — METOPROLOL SUCCINATE 25 MG: 25 TABLET, EXTENDED RELEASE ORAL at 08:41

## 2020-09-20 RX ADMIN — ASPIRIN 81 MG: 81 TABLET ORAL at 08:41

## 2020-09-20 RX ADMIN — CHOLESTYRAMINE 1 PACKET: 4 POWDER, FOR SUSPENSION ORAL at 09:57

## 2020-09-20 RX ADMIN — AZELASTINE HYDROCHLORIDE 1 SPRAY: 137 SPRAY, METERED NASAL at 00:00

## 2020-09-20 RX ADMIN — AZELASTINE HYDROCHLORIDE 1 SPRAY: 137 SPRAY, METERED NASAL at 08:41

## 2020-09-20 RX ADMIN — POTASSIUM CHLORIDE 40 MEQ: 750 CAPSULE, EXTENDED RELEASE ORAL at 08:41

## 2020-09-20 RX ADMIN — MAGNESIUM SULFATE HEPTAHYDRATE 4 G: 40 INJECTION, SOLUTION INTRAVENOUS at 18:40

## 2020-09-20 RX ADMIN — SODIUM BICARBONATE 650 MG: 650 TABLET ORAL at 17:16

## 2020-09-20 RX ADMIN — SODIUM BICARBONATE 650 MG: 650 TABLET ORAL at 08:41

## 2020-09-21 LAB
ALBUMIN SERPL-MCNC: 2.7 G/DL (ref 3.5–5.2)
ALBUMIN/GLOB SERPL: 0.8 G/DL
ALP SERPL-CCNC: 205 U/L (ref 39–117)
ALT SERPL W P-5'-P-CCNC: 9 U/L (ref 1–41)
ANION GAP SERPL CALCULATED.3IONS-SCNC: 10 MMOL/L (ref 5–15)
AST SERPL-CCNC: 18 U/L (ref 1–40)
BASOPHILS # BLD AUTO: 0.04 10*3/MM3 (ref 0–0.2)
BASOPHILS NFR BLD AUTO: 0.4 % (ref 0–1.5)
BILIRUB SERPL-MCNC: 0.3 MG/DL (ref 0–1.2)
BUN SERPL-MCNC: 28 MG/DL (ref 8–23)
BUN/CREAT SERPL: 12.4 (ref 7–25)
CALCIUM SPEC-SCNC: 8.7 MG/DL (ref 8.6–10.5)
CHLORIDE SERPL-SCNC: 105 MMOL/L (ref 98–107)
CO2 SERPL-SCNC: 18 MMOL/L (ref 22–29)
CREAT SERPL-MCNC: 2.26 MG/DL (ref 0.76–1.27)
DEPRECATED RDW RBC AUTO: 55.3 FL (ref 37–54)
EOSINOPHIL # BLD AUTO: 0.53 10*3/MM3 (ref 0–0.4)
EOSINOPHIL NFR BLD AUTO: 5.5 % (ref 0.3–6.2)
ERYTHROCYTE [DISTWIDTH] IN BLOOD BY AUTOMATED COUNT: 15.8 % (ref 12.3–15.4)
GFR SERPL CREATININE-BSD FRML MDRD: 29 ML/MIN/1.73
GLOBULIN UR ELPH-MCNC: 3.6 GM/DL
GLUCOSE SERPL-MCNC: 102 MG/DL (ref 65–99)
HCT VFR BLD AUTO: 26.3 % (ref 37.5–51)
HGB BLD-MCNC: 8.5 G/DL (ref 13–17.7)
IMM GRANULOCYTES # BLD AUTO: 0.19 10*3/MM3 (ref 0–0.05)
IMM GRANULOCYTES NFR BLD AUTO: 2 % (ref 0–0.5)
LYMPHOCYTES # BLD AUTO: 1.29 10*3/MM3 (ref 0.7–3.1)
LYMPHOCYTES NFR BLD AUTO: 13.3 % (ref 19.6–45.3)
MAGNESIUM SERPL-MCNC: 2.5 MG/DL (ref 1.6–2.4)
MCH RBC QN AUTO: 30.6 PG (ref 26.6–33)
MCHC RBC AUTO-ENTMCNC: 32.3 G/DL (ref 31.5–35.7)
MCV RBC AUTO: 94.6 FL (ref 79–97)
MONOCYTES # BLD AUTO: 0.73 10*3/MM3 (ref 0.1–0.9)
MONOCYTES NFR BLD AUTO: 7.5 % (ref 5–12)
NEUTROPHILS NFR BLD AUTO: 6.9 10*3/MM3 (ref 1.7–7)
NEUTROPHILS NFR BLD AUTO: 71.3 % (ref 42.7–76)
NRBC BLD AUTO-RTO: 0 /100 WBC (ref 0–0.2)
PLATELET # BLD AUTO: 126 10*3/MM3 (ref 140–450)
PMV BLD AUTO: 11.1 FL (ref 6–12)
POTASSIUM SERPL-SCNC: 5.2 MMOL/L (ref 3.5–5.2)
PROT SERPL-MCNC: 6.3 G/DL (ref 6–8.5)
RBC # BLD AUTO: 2.78 10*6/MM3 (ref 4.14–5.8)
SODIUM SERPL-SCNC: 133 MMOL/L (ref 136–145)
WBC # BLD AUTO: 9.68 10*3/MM3 (ref 3.4–10.8)

## 2020-09-21 PROCEDURE — 83735 ASSAY OF MAGNESIUM: CPT | Performed by: FAMILY MEDICINE

## 2020-09-21 PROCEDURE — 80053 COMPREHEN METABOLIC PANEL: CPT | Performed by: FAMILY MEDICINE

## 2020-09-21 PROCEDURE — 97535 SELF CARE MNGMENT TRAINING: CPT

## 2020-09-21 PROCEDURE — 85025 COMPLETE CBC W/AUTO DIFF WBC: CPT | Performed by: FAMILY MEDICINE

## 2020-09-21 RX ORDER — POTASSIUM CHLORIDE 750 MG/1
40 CAPSULE, EXTENDED RELEASE ORAL DAILY
Status: DISCONTINUED | OUTPATIENT
Start: 2020-09-22 | End: 2020-09-22

## 2020-09-21 RX ORDER — IPRATROPIUM BROMIDE AND ALBUTEROL SULFATE 2.5; .5 MG/3ML; MG/3ML
3 SOLUTION RESPIRATORY (INHALATION) EVERY 4 HOURS PRN
Status: DISCONTINUED | OUTPATIENT
Start: 2020-09-21 | End: 2020-09-22 | Stop reason: HOSPADM

## 2020-09-21 RX ADMIN — MESALAMINE 1.5 G: 375 CAPSULE, EXTENDED RELEASE ORAL at 08:30

## 2020-09-21 RX ADMIN — ASPIRIN 81 MG: 81 TABLET ORAL at 08:29

## 2020-09-21 RX ADMIN — METOPROLOL SUCCINATE 25 MG: 25 TABLET, EXTENDED RELEASE ORAL at 08:30

## 2020-09-21 RX ADMIN — CHOLESTYRAMINE 1 PACKET: 4 POWDER, FOR SUSPENSION ORAL at 08:31

## 2020-09-21 RX ADMIN — CHOLESTYRAMINE 1 PACKET: 4 POWDER, FOR SUSPENSION ORAL at 21:33

## 2020-09-21 RX ADMIN — PANTOPRAZOLE SODIUM 40 MG: 40 TABLET, DELAYED RELEASE ORAL at 06:32

## 2020-09-21 RX ADMIN — AZELASTINE HYDROCHLORIDE 1 SPRAY: 137 SPRAY, METERED NASAL at 08:31

## 2020-09-21 RX ADMIN — LEVOFLOXACIN 500 MG: 500 TABLET, FILM COATED ORAL at 17:50

## 2020-09-21 RX ADMIN — SODIUM BICARBONATE 650 MG: 650 TABLET ORAL at 08:29

## 2020-09-21 RX ADMIN — SODIUM CHLORIDE, PRESERVATIVE FREE 10 ML: 5 INJECTION INTRAVENOUS at 08:30

## 2020-09-21 RX ADMIN — AZELASTINE HYDROCHLORIDE 1 SPRAY: 137 SPRAY, METERED NASAL at 21:33

## 2020-09-21 RX ADMIN — SODIUM CHLORIDE, PRESERVATIVE FREE 10 ML: 5 INJECTION INTRAVENOUS at 21:34

## 2020-09-21 RX ADMIN — CETIRIZINE HYDROCHLORIDE 10 MG: 10 TABLET, FILM COATED ORAL at 08:30

## 2020-09-21 RX ADMIN — CLONIDINE HYDROCHLORIDE 0.2 MG: 0.2 TABLET ORAL at 08:29

## 2020-09-21 RX ADMIN — SODIUM BICARBONATE 650 MG: 650 TABLET ORAL at 21:33

## 2020-09-21 RX ADMIN — CLONIDINE HYDROCHLORIDE 0.2 MG: 0.2 TABLET ORAL at 17:50

## 2020-09-21 RX ADMIN — AMLODIPINE BESYLATE 10 MG: 10 TABLET ORAL at 08:29

## 2020-09-21 RX ADMIN — CLONIDINE HYDROCHLORIDE 0.2 MG: 0.2 TABLET ORAL at 21:33

## 2020-09-21 RX ADMIN — POTASSIUM CHLORIDE 40 MEQ: 750 CAPSULE, EXTENDED RELEASE ORAL at 08:29

## 2020-09-21 RX ADMIN — SODIUM BICARBONATE 650 MG: 650 TABLET ORAL at 17:50

## 2020-09-22 ENCOUNTER — READMISSION MANAGEMENT (OUTPATIENT)
Dept: CALL CENTER | Facility: HOSPITAL | Age: 70
End: 2020-09-22

## 2020-09-22 VITALS
WEIGHT: 162.8 LBS | RESPIRATION RATE: 18 BRPM | HEART RATE: 66 BPM | BODY MASS INDEX: 22.05 KG/M2 | SYSTOLIC BLOOD PRESSURE: 141 MMHG | HEIGHT: 72 IN | OXYGEN SATURATION: 98 % | TEMPERATURE: 98.1 F | DIASTOLIC BLOOD PRESSURE: 66 MMHG

## 2020-09-22 PROBLEM — N18.30 ACUTE RENAL FAILURE SUPERIMPOSED ON STAGE 3 CHRONIC KIDNEY DISEASE: Status: ACTIVE | Noted: 2020-07-11

## 2020-09-22 LAB
ANION GAP SERPL CALCULATED.3IONS-SCNC: 11 MMOL/L (ref 5–15)
BUN SERPL-MCNC: 37 MG/DL (ref 8–23)
BUN/CREAT SERPL: 14.7 (ref 7–25)
CALCIUM SPEC-SCNC: 9 MG/DL (ref 8.6–10.5)
CHLORIDE SERPL-SCNC: 104 MMOL/L (ref 98–107)
CO2 SERPL-SCNC: 20 MMOL/L (ref 22–29)
CREAT SERPL-MCNC: 2.52 MG/DL (ref 0.76–1.27)
CREAT SERPL-MCNC: 2.52 MG/DL (ref 0.76–1.27)
GFR SERPL CREATININE-BSD FRML MDRD: 25 ML/MIN/1.73
GFR SERPL CREATININE-BSD FRML MDRD: 25 ML/MIN/1.73
GLUCOSE SERPL-MCNC: 103 MG/DL (ref 65–99)
MAGNESIUM SERPL-MCNC: 2.1 MG/DL (ref 1.6–2.4)
POTASSIUM SERPL-SCNC: 5.7 MMOL/L (ref 3.5–5.2)
SODIUM SERPL-SCNC: 135 MMOL/L (ref 136–145)

## 2020-09-22 PROCEDURE — 97110 THERAPEUTIC EXERCISES: CPT

## 2020-09-22 PROCEDURE — 80048 BASIC METABOLIC PNL TOTAL CA: CPT | Performed by: NURSE PRACTITIONER

## 2020-09-22 PROCEDURE — 25010000002 INFLUENZA VAC SPLIT QUAD 0.5 ML SUSPENSION PREFILLED SYRINGE: Performed by: INTERNAL MEDICINE

## 2020-09-22 PROCEDURE — 83735 ASSAY OF MAGNESIUM: CPT | Performed by: FAMILY MEDICINE

## 2020-09-22 PROCEDURE — 82565 ASSAY OF CREATININE: CPT | Performed by: INTERNAL MEDICINE

## 2020-09-22 PROCEDURE — G0008 ADMIN INFLUENZA VIRUS VAC: HCPCS | Performed by: INTERNAL MEDICINE

## 2020-09-22 PROCEDURE — 90686 IIV4 VACC NO PRSV 0.5 ML IM: CPT | Performed by: INTERNAL MEDICINE

## 2020-09-22 PROCEDURE — 97116 GAIT TRAINING THERAPY: CPT

## 2020-09-22 PROCEDURE — 97535 SELF CARE MNGMENT TRAINING: CPT

## 2020-09-22 RX ORDER — CIPROFLOXACIN 250 MG/1
250 TABLET, FILM COATED ORAL 2 TIMES DAILY
Qty: 14 TABLET | Refills: 0 | Status: SHIPPED | OUTPATIENT
Start: 2020-09-22 | End: 2020-09-29

## 2020-09-22 RX ORDER — SODIUM POLYSTYRENE SULFONATE 15 G/60ML
15 SUSPENSION ORAL; RECTAL ONCE
Status: COMPLETED | OUTPATIENT
Start: 2020-09-22 | End: 2020-09-22

## 2020-09-22 RX ADMIN — CHOLESTYRAMINE 1 PACKET: 4 POWDER, FOR SUSPENSION ORAL at 09:12

## 2020-09-22 RX ADMIN — AMLODIPINE BESYLATE 10 MG: 10 TABLET ORAL at 09:12

## 2020-09-22 RX ADMIN — METOPROLOL SUCCINATE 25 MG: 25 TABLET, EXTENDED RELEASE ORAL at 09:12

## 2020-09-22 RX ADMIN — MESALAMINE 1.5 G: 375 CAPSULE, EXTENDED RELEASE ORAL at 09:12

## 2020-09-22 RX ADMIN — CLONIDINE HYDROCHLORIDE 0.2 MG: 0.2 TABLET ORAL at 09:12

## 2020-09-22 RX ADMIN — PANTOPRAZOLE SODIUM 40 MG: 40 TABLET, DELAYED RELEASE ORAL at 06:42

## 2020-09-22 RX ADMIN — INFLUENZA VIRUS VACCINE 0.5 ML: 15; 15; 15; 15 SUSPENSION INTRAMUSCULAR at 12:36

## 2020-09-22 RX ADMIN — SODIUM BICARBONATE 650 MG: 650 TABLET ORAL at 09:12

## 2020-09-22 RX ADMIN — CETIRIZINE HYDROCHLORIDE 10 MG: 10 TABLET, FILM COATED ORAL at 09:12

## 2020-09-22 RX ADMIN — POTASSIUM CHLORIDE 40 MEQ: 750 CAPSULE, EXTENDED RELEASE ORAL at 09:12

## 2020-09-22 RX ADMIN — SODIUM POLYSTYRENE SULFONATE 15 G: 15 SUSPENSION ORAL; RECTAL at 11:03

## 2020-09-22 RX ADMIN — ASPIRIN 81 MG: 81 TABLET ORAL at 09:12

## 2020-09-22 RX ADMIN — SODIUM CHLORIDE, PRESERVATIVE FREE 10 ML: 5 INJECTION INTRAVENOUS at 09:13

## 2020-09-22 RX ADMIN — AZELASTINE HYDROCHLORIDE 1 SPRAY: 137 SPRAY, METERED NASAL at 09:13

## 2020-09-22 NOTE — OUTREACH NOTE
Prep Survey      Responses   [facility] patient discharged from?  [place]   Is LACE score < 7 ?  No   Eligibility  [facility]   Date of Admission  09/15/20   Date of Discharge  09/22/20   Discharge Disposition  Home or Self Care   Discharge diagnosis  Sepsis with acute renal failure    COVID-19 Test Status  Negative   Does the patient have one of the following disease processes/diagnoses(primary or secondary)?  Sepsis   Does the patient have Home health ordered?  No   Is there a DME ordered?  No   Prep survey completed?  Yes          Danitza Cooper RN

## 2020-09-23 ENCOUNTER — TRANSITIONAL CARE MANAGEMENT TELEPHONE ENCOUNTER (OUTPATIENT)
Dept: CALL CENTER | Facility: HOSPITAL | Age: 70
End: 2020-09-23

## 2020-09-23 LAB
BACTERIA SPEC AEROBE CULT: NORMAL
BACTERIA SPEC AEROBE CULT: NORMAL

## 2020-09-23 NOTE — OUTREACH NOTE
Call Center TCM Note      Responses   [facility] patient discharged from?  [place]   COVID-19 Test Status  Negative   Does the patient have one of the following disease processes/diagnoses(primary or secondary)?  Sepsis   TCM attempt successful?  No   Unsuccessful attempts  Attempt 1          Barbra Samson MA    9/23/2020, 14:47 CDT

## 2020-09-23 NOTE — OUTREACH NOTE
Call Center TCM Note      Responses   [facility] patient discharged from?  [place]   COVID-19 Test Status  Negative   Does the patient have one of the following disease processes/diagnoses(primary or secondary)?  Sepsis   TCM attempt successful?  No   Unsuccessful attempts  Attempt 2          Barbra Samson MA    9/23/2020, 15:29 CDT

## 2020-09-24 ENCOUNTER — TRANSITIONAL CARE MANAGEMENT TELEPHONE ENCOUNTER (OUTPATIENT)
Dept: CALL CENTER | Facility: HOSPITAL | Age: 70
End: 2020-09-24

## 2020-09-24 NOTE — OUTREACH NOTE
Call Center TCM Note      Responses   [facility] patient discharged from?  [place]   COVID-19 Test Status  Negative   Does the patient have one of the following disease processes/diagnoses(primary or secondary)?  Sepsis   TCM attempt successful?  Yes   Call start time  0933   Call end time  0938   Discharge diagnosis  Sepsis with acute renal failure    Meds reviewed with patient/caregiver?  Yes   Is the patient having any side effects they believe may be caused by any medication additions or changes?  No   Does the patient have all medications related to this admission filled (includes all antibiotics, inhalers, nebulizers,steroids,etc.)  Yes   Is the patient taking all medications as directed (includes completed medication regime)?  Yes   Does the patient have a primary care provider?   Yes   Comments regarding PCP  PCP Dr Joe Velacso. Patient has a hospital followup scheduled on 9/29/20   Does the patient have an appointment with their PCP within 7 days of discharge?  Yes   Has the patient kept scheduled appointments due by today?  N/A   Has home health visited the patient within 72 hours of discharge?  N/A   Pulse Ox monitoring  None   Psychosocial issues?  No   Comments  Patient reports he is doing well. Denies fever.    Did the patient receive a copy of their discharge instructions?  Yes   Nursing interventions  Reviewed instructions with patient, Educated on MyChart   What is the patient's perception of their health status since discharge?  Improving   Is the patient/caregiver able to teach back Sepsis?  S - Shivering,fever or very cold, E - Extreme pain or generalized discomfort (worst ever,especially abdomen), P - Pale or discolored skin, S - Sleepy, difficult to arouse,confused, I -   I feel like I might die-a feeling of hopelessness, S - Short of breath   Nursing interventions  Nurse provided reassurance to patient, Nurse provided patient education   Is patient/caregiver able to teach back steps to  recovery at home?  Set small, achievable goals for return to baseline health, Rest and regain strength, Make a list of questions for PCP appoinment   Is the patient/caregiver able to teach back signs and symptoms of worsening condition:  Fever, Rapid heart rate (>90), Shortness of breath/rapid respiratory rate   Is the patient/caregiver able to teach back the hierarchy of who to call/visit for symptoms/problems? PCP, Specialist, Home health nurse, Urgent Care, ED, 911  Yes   TCM call completed?  Yes          Nelson Maxwell RN    9/24/2020, 09:38 EDT

## 2020-09-29 ENCOUNTER — OFFICE VISIT (OUTPATIENT)
Dept: INTERNAL MEDICINE | Facility: CLINIC | Age: 70
End: 2020-09-29

## 2020-09-29 ENCOUNTER — OFFICE VISIT (OUTPATIENT)
Dept: GASTROENTEROLOGY | Facility: CLINIC | Age: 70
End: 2020-09-29

## 2020-09-29 VITALS
HEART RATE: 60 BPM | TEMPERATURE: 96.9 F | WEIGHT: 167 LBS | SYSTOLIC BLOOD PRESSURE: 124 MMHG | BODY MASS INDEX: 22.62 KG/M2 | DIASTOLIC BLOOD PRESSURE: 58 MMHG | HEIGHT: 72 IN | OXYGEN SATURATION: 99 %

## 2020-09-29 VITALS
SYSTOLIC BLOOD PRESSURE: 160 MMHG | TEMPERATURE: 98.7 F | DIASTOLIC BLOOD PRESSURE: 60 MMHG | BODY MASS INDEX: 22.21 KG/M2 | WEIGHT: 164 LBS | HEART RATE: 67 BPM | OXYGEN SATURATION: 99 % | HEIGHT: 72 IN

## 2020-09-29 DIAGNOSIS — K50.118 CROHN'S DISEASE OF LARGE INTESTINE WITH OTHER COMPLICATION (HCC): ICD-10-CM

## 2020-09-29 DIAGNOSIS — K52.9 CHRONIC DIARRHEA: Primary | ICD-10-CM

## 2020-09-29 DIAGNOSIS — N17.9 ACUTE RENAL FAILURE SUPERIMPOSED ON STAGE 3 CHRONIC KIDNEY DISEASE, UNSPECIFIED ACUTE RENAL FAILURE TYPE: ICD-10-CM

## 2020-09-29 DIAGNOSIS — N18.3 ACUTE RENAL FAILURE SUPERIMPOSED ON STAGE 3 CHRONIC KIDNEY DISEASE, UNSPECIFIED ACUTE RENAL FAILURE TYPE: ICD-10-CM

## 2020-09-29 DIAGNOSIS — I10 ESSENTIAL HYPERTENSION: Primary | ICD-10-CM

## 2020-09-29 PROCEDURE — 99214 OFFICE O/P EST MOD 30 MIN: CPT | Performed by: INTERNAL MEDICINE

## 2020-09-29 RX ORDER — POLYETHYLENE GLYCOL 3350, SODIUM CHLORIDE, SODIUM BICARBONATE, POTASSIUM CHLORIDE 420; 11.2; 5.72; 1.48 G/4L; G/4L; G/4L; G/4L
4000 POWDER, FOR SOLUTION ORAL SEE ADMIN INSTRUCTIONS
Qty: 4000 ML | Refills: 0 | Status: SHIPPED | OUTPATIENT
Start: 2020-09-29 | End: 2021-03-17

## 2020-09-29 RX ORDER — AMLODIPINE BESYLATE 10 MG/1
10 TABLET ORAL DAILY
COMMUNITY
End: 2021-03-17 | Stop reason: SDUPTHER

## 2020-09-29 RX ORDER — MAGNESIUM CHLORIDE 64 MG
143 TABLET, DELAYED RELEASE (ENTERIC COATED) ORAL DAILY
COMMUNITY

## 2020-09-29 RX ORDER — POTASSIUM CHLORIDE 750 MG/1
10 TABLET, FILM COATED, EXTENDED RELEASE ORAL 2 TIMES DAILY
COMMUNITY
End: 2020-12-22 | Stop reason: SDUPTHER

## 2020-09-29 RX ORDER — LEVOFLOXACIN 250 MG/1
250 TABLET ORAL 4 TIMES DAILY
COMMUNITY
End: 2021-03-17

## 2020-09-29 NOTE — PROGRESS NOTES
Subjective   Ricardo Hugo is a 70 y.o. male.   Chief Complaint   Patient presents with   • Follow-up     HOSPITAL FOLLOW UP; DC FROM 09/22/2020: Sepsis with acute renal failure        History of Present Illness     The following portions of the patient's history were reviewed and updated as appropriate: allergies, current medications, past family history, past medical history, past social history, past surgical history and problem list.    Review of Systems    Objective   Past Medical History:   Diagnosis Date   • 3-vessel CAD 8/11/2020   • Allergic rhinitis    • Anxiety disorder 4/27/2020   • Asymmetrical sensorineural hearing loss 6/28/2017   • Atherosclerosis of native artery of both lower extremities with intermittent claudication (CMS/[facility]) 7/18/2019   • Avascular necrosis of femoral head, left (CMS/[facility]) 7/11/2020   • Chronic mucoid otitis media    • Chronic rhinitis    • Coronary artery disease     HEART BYPASS 2004   • Crohn's disease of large intestine with other complication (CMS/[facility]) 7/30/2020    Chronic diarrhea Colonoscopy July 2020 revealed mild patchy scattered hemosiderin staining with inflammation more so in rectosigmoid area.  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   • Iron deficiency anemia 7/14/2020   • Mixed hearing loss of left ear    • PAD (peripheral artery disease) (CMS/[facility]) 8/11/2020   • Perianal abscess    • Pernicious anemia 8/17/2020   • Personal history of alcoholism (CMS/[facility]) 8/11/2020   • Prostatic hypertrophy 8/11/2020   • Sensorineural hearing loss    • Tinnitus    • Ventricular tachycardia, nonsustained (CMS/[facility]) 7/14/2020   • Weight loss 7/11/2020      Past  Surgical History:   Procedure Laterality Date   • COLONOSCOPY N/A 7/2/2020    Procedure: COLONOSCOPY WITH ANESTHESIA;  Surgeon: Adrien Brewster MD;  Location: [facility] ENDOSCOPY;  Service: Gastroenterology;  Laterality: N/A;  pre op: diarrhea  post op: polyps  PCP: Joe Velasco MD   • CORONARY ARTERY BYPASS GRAFT     • INCISION AND DRAINAGE PERIRECTAL ABSCESS N/A 3/3/2017    Procedure: INCISION AND DRAINAGE OF JEET ANAL ABSCESS;  Surgeon: Lynette Smith MD;  Location: [facility] OR;  Service:    • MYRINGOTOMY W/ TUBES Left 04/17/2017    06/10/2016   • TONSILLECTOMY     • TOTAL HIP ARTHROPLASTY          Current Outpatient Medications:   •  albuterol (PROVENTIL HFA;VENTOLIN HFA) 108 (90 BASE) MCG/ACT inhaler, Inhale 2 puffs 4 (Four) Times a Day As Needed for Wheezing or Shortness of Air., Disp: , Rfl:   •  amLODIPine (NORVASC) 10 MG tablet, Take 10 mg by mouth Daily., Disp: , Rfl:   •  aspirin (ASPIR) 81 MG EC tablet, Take 81 mg by mouth Daily., Disp: , Rfl:   •  azelastine (ASTELIN) 0.1 % nasal spray, 1 spray into the nostril(s) as directed by provider 2 (Two) Times a Day As Needed for Rhinitis., Disp: , Rfl:   •  cholestyramine (QUESTRAN) 4 g packet, Take 1 packet by mouth Every 12 (Twelve) Hours., Disp: 60 packet, Rfl: 2  •  CloNIDine (CATAPRES) 0.2 MG tablet, Take 0.2 mg by mouth 3 (Three) Times a Day., Disp: , Rfl:   •  diphenhydrAMINE (Benadryl Allergy) 25 mg capsule, Take 25 mg by mouth Every 6 (Six) Hours As Needed for Itching., Disp: , Rfl:   •  fexofenadine (ALLEGRA) 180 MG tablet, Take 180 mg by mouth Daily., Disp: , Rfl:   •  fluticasone (FLONASE) 50 MCG/ACT nasal spray, 2 sprays into the nostril(s) as directed by provider Daily As Needed for Rhinitis., Disp: , Rfl:   •  levoFLOXacin (LEVAQUIN) 250 MG tablet, Take 250 mg by mouth 4 (Four) Times a Day., Disp: , Rfl:   •  magnesium chloride ER 64 MG DR tablet, Take 143 mg by mouth Daily., Disp: , Rfl:   •  Melatonin 10 MG capsule, Take 2 capsules by  mouth Every Night., Disp: , Rfl:   •  mesalamine (APRISO) 0.375 g 24 hr capsule, Take 4 capsules by mouth Daily., Disp: 120 capsule, Rfl: 3  •  metoprolol succinate XL (TOPROL-XL) 25 MG 24 hr tablet, Take 1 tablet by mouth Daily., Disp: 30 tablet, Rfl: 5  •  Multiple Vitamins-Minerals (PRESERVISION/LUTEIN) capsule, Take 1 capsule by mouth 2 (two) times a day., Disp: , Rfl:   •  omeprazole (priLOSEC) 20 MG capsule, Take 20 mg by mouth Daily., Disp: , Rfl:   •  potassium chloride 10 MEQ CR tablet, Take 10 mEq by mouth 2 (Two) Times a Day., Disp: , Rfl:   •  sodium bicarbonate 650 MG tablet, Take 1 tablet by mouth 2 (Two) Times a Day., Disp: 60 tablet, Rfl: 2     Vitals:    09/29/20 1117   BP: 160/60   Pulse: 67   Temp: 98.7 °F (37.1 °C)   SpO2: 99%         09/29/20  1117   Weight: 74.4 kg (164 lb)     Patient's Body mass index is 22.24 kg/m². BMI is {BMI range:24046}.      Physical Exam          Assessment/Plan   There are no diagnoses linked to this encounter.

## 2020-09-29 NOTE — PROGRESS NOTES
Transitional Care Follow Up Visit  Subjective     Ricardo Hugo is a 70 y.o. male who presents for a transitional care management visit.    Within 48 business hours after discharge our office contacted him via telephone to coordinate his care and needs.      I reviewed and discussed the details of that call along with the discharge summary, hospital problems, inpatient lab results, inpatient diagnostic studies, and consultation reports with Ricardo.     Current outpatient and discharge medications have been reconciled for the patient.  Reviewed by: Joe Velasco MD      Date of TCM Phone Call 9/22/2020   [facility]   Date of Admission 9/15/2020   Date of Discharge 9/22/2020   Discharge Disposition Home or Self Care     Risk for Readmission (LACE) Score: 12 (9/22/2020  5:00 AM)      Patient is here for hospital follow-up from sepsis renal failure and Crohn's disease.     Course During Hospital Stay: I reviewed patient's hospitalization his medical records basically patient had become septic he went into renal failure again.     The following portions of the patient's history were reviewed and updated as appropriate: allergies, current medications, past family history, past medical history, past social history, past surgical history and problem list.    Review of Systems   Constitutional: Negative for activity change, appetite change and chills.   HENT: Negative for congestion, ear pain and facial swelling.    Eyes: Negative for pain, discharge and itching.   Respiratory: Negative for apnea, cough and shortness of breath.    Cardiovascular: Negative for chest pain, palpitations and leg swelling.   Gastrointestinal: Negative for abdominal distention, abdominal pain and anal bleeding.   Endocrine: Negative for cold intolerance and heat intolerance.   Genitourinary: Negative for difficulty urinating, dysuria and flank pain.   Musculoskeletal: Negative for arthralgias, back pain and joint  swelling.   Skin: Negative for color change, pallor and rash.   Allergic/Immunologic: Negative for environmental allergies and food allergies.   Neurological: Negative for dizziness and facial asymmetry.   Hematological: Negative for adenopathy. Does not bruise/bleed easily.   Psychiatric/Behavioral: Negative for agitation, behavioral problems and confusion.       Objective   Physical Exam  Vitals signs and nursing note reviewed.   Constitutional:       General: He is not in acute distress.     Appearance: Normal appearance. He is well-developed.   HENT:      Head: Normocephalic and atraumatic.      Right Ear: External ear normal.      Left Ear: External ear normal.      Nose: Nose normal.   Eyes:      Extraocular Movements: Extraocular movements intact.      Conjunctiva/sclera: Conjunctivae normal.      Pupils: Pupils are equal, round, and reactive to light.   Neck:      Musculoskeletal: Normal range of motion and neck supple. No neck rigidity or muscular tenderness.   Cardiovascular:      Rate and Rhythm: Normal rate and regular rhythm.      Pulses: Normal pulses.      Heart sounds: Normal heart sounds.   Pulmonary:      Effort: Pulmonary effort is normal.      Breath sounds: Normal breath sounds.   Abdominal:      General: Bowel sounds are normal.      Palpations: Abdomen is soft.   Musculoskeletal: Normal range of motion.   Skin:     General: Skin is warm and dry.   Neurological:      General: No focal deficit present.      Mental Status: He is alert and oriented to person, place, and time.   Psychiatric:         Mood and Affect: Mood normal.         Behavior: Behavior normal.         Assessment/Plan   Ricardo was seen today for follow-up.    Diagnoses and all orders for this visit:    Essential hypertension    Acute renal failure superimposed on stage 3 chronic kidney disease, unspecified acute renal failure type    Crohn's disease of large intestine with other complication (CMS/HCC)    At this point patient  seems to be doing quite well reviewed his creatinine and GFR numbers from the hospitalization he continues in renal failure.  Hopefully his renal failure will improve with hydration and the further he gets away from the sepsis.  Over this is more of a shock kidney problem/ATN that will resolve with time he does have follow-up appointment with renal he is seeing Dr. Bran from gastroenterology this afternoon.  He asked me what he should talk with Dr. griffin about I encouraged him to talk about his Crohn's disease and treatment options.

## 2020-09-29 NOTE — PROGRESS NOTES
[facility] Gastroenterology    Chief Complaint   Patient presents with   • Diarrhea       Subjective     HPI    Ricardo Hugo is a 70 y.o. male who presents with a chief complaint of Crohn's and diarrhea.    He was in the hospital mid-September.  He was there with UTI and acute renal failure.  They attributed his acute renal failure to diarrhea.  But similar to when he was in the hospital in August he told me he was not having that much diarrhea.  When I saw him there in September he states he was maybe having 2-3 bowel movements at most.  He states they were controlled with Questran but he was only taking it once a day.  Previously in August he was not taking the Apriso except for 1 tablet daily.  When we saw him in September he was taken the Apriso 4 tablets daily.  He was diagnosed with urinary tract infection and felt to be may be prostatitis.  He is seeing nephrology.    He comes in for follow-up visit.  He states he is having 1 bowel movement a day.  He describes this as soft mud.  There is no blood or mucus.  No nocturnal bowel movements.  No abdominal cramping.  He is eating well.  He finished his Cipro for his UTI type symptoms 2 days ago.  He saw his primary care provider earlier today who felt everything was stable.  He is due to see nephrology tomorrow.  He is now drinking 2 bottles of Gatorade a day to help with hydration.    He continues on Apriso 4 tablets daily and is taking Questran 1 packet twice a day.      Past Medical History:   Diagnosis Date   • 3-vessel CAD 8/11/2020   • Allergic rhinitis    • Anxiety disorder 4/27/2020   • Asymmetrical sensorineural hearing loss 6/28/2017   • Atherosclerosis of native artery of both lower extremities with intermittent claudication (CMS/[facility]) 7/18/2019   • Avascular necrosis of femoral head, left (CMS/[facility]) 7/11/2020   • Chronic mucoid otitis media    • Chronic rhinitis    • Coronary artery disease     HEART BYPASS 2004   • Crohn's disease of large  intestine with other complication (CMS/[facility]) 7/30/2020    Chronic diarrhea Colonoscopy July 2020 revealed mild patchy scattered hemosiderin staining with inflammation more so in rectosigmoid area.  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   • Iron deficiency anemia 7/14/2020   • Mixed hearing loss of left ear    • PAD (peripheral artery disease) (CMS/[facility]) 8/11/2020   • Perianal abscess    • Pernicious anemia 8/17/2020   • Personal history of alcoholism (CMS/[facility]) 8/11/2020   • Prostatic hypertrophy 8/11/2020   • Sensorineural hearing loss    • Tinnitus    • Ventricular tachycardia, nonsustained (CMS/[facility]) 7/14/2020   • Weight loss 7/11/2020       Past Surgical History:   Procedure Laterality Date   • COLONOSCOPY N/A 7/2/2020    Procedure: COLONOSCOPY WITH ANESTHESIA;  Surgeon: Adrien Brewster MD;  Location: [facility] ENDOSCOPY;  Service: Gastroenterology;  Laterality: N/A;  pre op: diarrhea  post op: polyps  PCP: Joe Velasco MD   • CORONARY ARTERY BYPASS GRAFT     • INCISION AND DRAINAGE PERIRECTAL ABSCESS N/A 3/3/2017    Procedure: INCISION AND DRAINAGE OF JEET ANAL ABSCESS;  Surgeon: Lynette Simth MD;  Location: [facility] OR;  Service:    • MYRINGOTOMY W/ TUBES Left 04/17/2017    06/10/2016   • TONSILLECTOMY     • TOTAL HIP ARTHROPLASTY           Current Outpatient Medications:   •  albuterol (PROVENTIL HFA;VENTOLIN HFA) 108 (90 BASE) MCG/ACT inhaler, Inhale 2 puffs 4 (Four) Times a Day As Needed for Wheezing or Shortness of Air., Disp: , Rfl:   •  amLODIPine (NORVASC) 10 MG tablet, Take 10 mg by mouth Daily., Disp: , Rfl:   •  aspirin (ASPIR) 81 MG EC tablet, Take 81 mg by mouth Daily., Disp: ,  Rfl:   •  Aspirin-Acetaminophen-Caffeine (EXCEDRIN EXTRA STRENGTH PO), Take  by mouth., Disp: , Rfl:   •  azelastine (ASTELIN) 0.1 % nasal spray, 1 spray into the nostril(s) as directed by provider 2 (Two) Times a Day As Needed for Rhinitis., Disp: , Rfl:   •  cholestyramine (QUESTRAN) 4 g packet, Take 1 packet by mouth Every 12 (Twelve) Hours., Disp: 60 packet, Rfl: 2  •  CloNIDine (CATAPRES) 0.2 MG tablet, Take 0.2 mg by mouth 3 (Three) Times a Day., Disp: , Rfl:   •  diphenhydrAMINE (Benadryl Allergy) 25 mg capsule, Take 25 mg by mouth Every 6 (Six) Hours As Needed for Itching., Disp: , Rfl:   •  fexofenadine (ALLEGRA) 180 MG tablet, Take 180 mg by mouth Daily., Disp: , Rfl:   •  fluticasone (FLONASE) 50 MCG/ACT nasal spray, 2 sprays into the nostril(s) as directed by provider Daily As Needed for Rhinitis., Disp: , Rfl:   •  levoFLOXacin (LEVAQUIN) 250 MG tablet, Take 250 mg by mouth 4 (Four) Times a Day., Disp: , Rfl:   •  magnesium chloride ER 64 MG DR tablet, Take 143 mg by mouth Daily., Disp: , Rfl:   •  Melatonin 10 MG capsule, Take 2 capsules by mouth Every Night., Disp: , Rfl:   •  mesalamine (APRISO) 0.375 g 24 hr capsule, Take 4 capsules by mouth Daily., Disp: 120 capsule, Rfl: 3  •  Multiple Vitamins-Minerals (PRESERVISION/LUTEIN) capsule, Take 1 capsule by mouth 2 (two) times a day., Disp: , Rfl:   •  omeprazole (priLOSEC) 20 MG capsule, Take 20 mg by mouth Daily., Disp: , Rfl:   •  potassium chloride 10 MEQ CR tablet, Take 10 mEq by mouth 2 (Two) Times a Day., Disp: , Rfl:   •  sodium bicarbonate 650 MG tablet, Take 1 tablet by mouth 2 (Two) Times a Day., Disp: 60 tablet, Rfl: 2  •  amLODIPine (NORVASC) 10 MG tablet, Take 10 mg by mouth Daily., Disp: , Rfl:   •  metoprolol succinate XL (TOPROL-XL) 25 MG 24 hr tablet, Take 1 tablet by mouth Daily., Disp: 30 tablet, Rfl: 5  •  polyethylene glycol-electrolytes (Nulytely with Flavor Packs) 420 g solution, Take 4,000 mL by mouth See Admin Instructions.,  Disp: 4000 mL, Rfl: 0    Allergies   Allergen Reactions   • Lortab [Hydrocodone-Acetaminophen] Other (See Comments) and Hallucinations     CLOSTROPHOBIC   • Allopurinol Other (See Comments)     Pain on right side       Social History     Socioeconomic History   • Marital status:      Spouse name: Not on file   • Number of children: Not on file   • Years of education: Not on file   • Highest education level: Not on file   Tobacco Use   • Smoking status: Former Smoker   • Smokeless tobacco: Never Used   • Tobacco comment: quit 2010   Substance and Sexual Activity   • Alcohol use: Yes     Alcohol/week: 14.0 standard drinks     Types: 14 Cans of beer per week     Frequency: Monthly or less     Comment: rare   • Drug use: No   • Sexual activity: Defer       Family History   Problem Relation Age of Onset   • No Known Problems Mother    • No Known Problems Father    • Colon cancer Neg Hx    • Colon polyps Neg Hx        Review of Systems  General no fever chills or sweats weight stable  Gastrointestinal: Not present-abdominal pain, constipation, diarrhea, dysphagia, hematemesis, melena, odynophagia, nausea, vomiting, pyrosis, regurgitation, hematochezia,    Objective     Vitals:    09/29/20 1503   BP: 124/58   Pulse: 60   Temp: 96.9 °F (36.1 °C)   SpO2: 99%       Physical Exam  No acute distress. Vital signs as documented. Skin warm and dry and without overt rashes. EOMI, sclera anicteric.  Neck without JVD or masses. Lungs clear to auscultation bilaterally, no rales. Heart exam notable for regular rhythm, normal sounds and absence of loud murmurs, rubs or gallops. Abdomen is soft, nontender, non distended, normal bowel sounds and without evidence of organomegaly, masses, or abdominal aortic enlargement. Extremities nonedematous, no cyanosis. Neuro alert, moves extremities.        Assessment/Plan   Problem List Items Addressed This Visit        Digestive    Chronic diarrhea - Primary    Relevant Orders    Case  Request (Completed)    Crohn's disease of large intestine with other complication (CMS/HCC)    Overview     Chronic diarrhea Colonoscopy July 2020 revealed mild patchy scattered hemosiderin staining with inflammation more so in rectosigmoid area.  [facility] lab IBD first step consistent with Crohn's.    T spot and hepatitis panel -September 16, 2020         Relevant Orders    Case Request (Completed)            He is relatively asymptomatic.  He is only having 1 bowel movement a day.  This is on 2 packets of Questran a day and Apriso 4 tablets daily.  His colonoscopy in July only showed minimal inflammatory changes.  As discussed, he is been in the hospital several times with acute renal insufficiency blamed on profound diarrhea but he really has not had profound diarrhea prior to those hospitalizations.  He did have loose watery stools chronically but not profound diarrhea and it is currently overall improved.    From a GI standpoint, I do recommend repeat colonoscopy to see if his inflammatory changes have improved, stated same or progressively get worse.  If the latter, we talked about pursuing biologic therapy such as Humira.  We have checked him for hepatitis and TB and these were unremarkable.  He would be willing to consider Humira if it is progressing.  Clinically, it is not but I think the colonoscopy will be helpful.  We will plan for this in a couple weeks.  We will have him come back and see us in the office in about 4 weeks.    In the meantime I did reinforce good hydration.  If he starts having diarrhea these to contact us.  He needs to continue follow-up with nephrology and primary care.  He agrees to do so.    Continue ongoing management by primary care provider and other specialists.     Patient's Body mass index is 22.65 kg/m². BMI is within normal parameters. No follow-up required..    The risk of the colonoscopy were discussed in detail.  We discussed the risk of perforation (one out of  2109-2808), bleeding (one out of 500), and the rare risks of ruptured spleen,  infection, adverse reaction to anesthesia, respiratory failure, cardiac failure including MI and adverse reaction to medications, etc.  We discussed consequences that could occur if a risk were to develop such as the need for hospitalization, blood transfusion, surgical intervention, medications, pain and disability and death.    EMR Dragon/transcription disclaimer:  Much of this encounter note is electronic transcription/translation of spoken language to printed text.  The electronic translation of spoken language may be erroneous, or at times, nonsensical words or phrases may be inadvertently transcribed.  Although I have reviewed the note for such errors, some may still exist.    Adrien Brewster MD  15:51 CDT  09/29/20

## 2020-09-30 ENCOUNTER — READMISSION MANAGEMENT (OUTPATIENT)
Dept: CALL CENTER | Facility: HOSPITAL | Age: 70
End: 2020-09-30

## 2020-09-30 NOTE — OUTREACH NOTE
Sepsis Week 2 Survey      Responses   [facility] patient discharged from?  [place]   COVID-19 Test Status  Negative   Does the patient have one of the following disease processes/diagnoses(primary or secondary)?  Sepsis   Week 2 attempt successful?  Yes   Call start time  1232   Call end time  1233   Discharge diagnosis  Sepsis with acute renal failure    Meds reviewed with patient/caregiver?  Yes   Is the patient taking all medications as directed (includes completed medication regime)?  Yes   Has the patient kept scheduled appointments due by today?  Yes   Pulse Ox monitoring  None   Psychosocial issues?  No   What is the patient's perception of their health status since discharge?  Returned to baseline/stable   Additional teach back comments  .   Week 2 call completed?  Yes   Revoked  No further contact(revokes)-requires comment   Graduated/Revoked comments  baseline   Wrap up additional comments  Patient denies any questions or needs today. Brief call.           Nikki Marsh RN

## 2020-10-02 RX ORDER — CLONIDINE HYDROCHLORIDE 0.2 MG/1
TABLET ORAL
Qty: 270 TABLET | Refills: 3 | Status: SHIPPED | OUTPATIENT
Start: 2020-10-02 | End: 2021-09-07

## 2020-10-02 RX ORDER — ALBUTEROL SULFATE 90 UG/1
AEROSOL, METERED RESPIRATORY (INHALATION)
Qty: 20.1 G | Refills: 3 | Status: SHIPPED | OUTPATIENT
Start: 2020-10-02 | End: 2021-07-12

## 2020-10-06 ENCOUNTER — TRANSCRIBE ORDERS (OUTPATIENT)
Dept: ADMINISTRATIVE | Facility: HOSPITAL | Age: 70
End: 2020-10-06

## 2020-10-06 DIAGNOSIS — Z01.818 PRE-OP TESTING: Primary | ICD-10-CM

## 2020-10-10 ENCOUNTER — LAB (OUTPATIENT)
Dept: LAB | Facility: HOSPITAL | Age: 70
End: 2020-10-10

## 2020-10-10 PROCEDURE — C9803 HOPD COVID-19 SPEC COLLECT: HCPCS

## 2020-10-10 PROCEDURE — U0003 INFECTIOUS AGENT DETECTION BY NUCLEIC ACID (DNA OR RNA); SEVERE ACUTE RESPIRATORY SYNDROME CORONAVIRUS 2 (SARS-COV-2) (CORONAVIRUS DISEASE [COVID-19]), AMPLIFIED PROBE TECHNIQUE, MAKING USE OF HIGH THROUGHPUT TECHNOLOGIES AS DESCRIBED BY CMS-2020-01-R: HCPCS | Performed by: INTERNAL MEDICINE

## 2020-10-10 PROCEDURE — C9803 HOPD COVID-19 SPEC COLLECT: HCPCS | Performed by: INTERNAL MEDICINE

## 2020-10-11 LAB
COVID LABCORP PRIORITY: NORMAL
SARS-COV-2 RNA RESP QL NAA+PROBE: NOT DETECTED

## 2020-10-13 ENCOUNTER — TELEPHONE (OUTPATIENT)
Dept: GASTROENTEROLOGY | Facility: CLINIC | Age: 70
End: 2020-10-13

## 2020-10-13 ENCOUNTER — HOSPITAL ENCOUNTER (OUTPATIENT)
Facility: HOSPITAL | Age: 70
Setting detail: HOSPITAL OUTPATIENT SURGERY
Discharge: HOME | End: 2020-10-13
Attending: INTERNAL MEDICINE | Admitting: INTERNAL MEDICINE

## 2020-10-13 ENCOUNTER — ANESTHESIA (OUTPATIENT)
Dept: GASTROENTEROLOGY | Facility: HOSPITAL | Age: 70
End: 2020-10-13

## 2020-10-13 ENCOUNTER — ANESTHESIA EVENT (OUTPATIENT)
Dept: GASTROENTEROLOGY | Facility: HOSPITAL | Age: 70
End: 2020-10-13

## 2020-10-13 VITALS
SYSTOLIC BLOOD PRESSURE: 190 MMHG | WEIGHT: 154 LBS | RESPIRATION RATE: 18 BRPM | HEART RATE: 59 BPM | OXYGEN SATURATION: 100 % | HEIGHT: 72 IN | BODY MASS INDEX: 20.86 KG/M2 | DIASTOLIC BLOOD PRESSURE: 70 MMHG | TEMPERATURE: 97.7 F

## 2020-10-13 DIAGNOSIS — K52.9 CHRONIC DIARRHEA: ICD-10-CM

## 2020-10-13 DIAGNOSIS — K50.118 CROHN'S DISEASE OF LARGE INTESTINE WITH OTHER COMPLICATION (HCC): ICD-10-CM

## 2020-10-13 PROCEDURE — 88305 TISSUE EXAM BY PATHOLOGIST: CPT | Performed by: INTERNAL MEDICINE

## 2020-10-13 PROCEDURE — 45380 COLONOSCOPY AND BIOPSY: CPT | Performed by: INTERNAL MEDICINE

## 2020-10-13 PROCEDURE — 25010000002 PROPOFOL 10 MG/ML EMULSION: Performed by: NURSE ANESTHETIST, CERTIFIED REGISTERED

## 2020-10-13 RX ORDER — SODIUM CHLORIDE 0.9 % (FLUSH) 0.9 %
10 SYRINGE (ML) INJECTION AS NEEDED
Status: DISCONTINUED | OUTPATIENT
Start: 2020-10-13 | End: 2020-10-13 | Stop reason: HOSPADM

## 2020-10-13 RX ORDER — SODIUM CHLORIDE 9 MG/ML
500 INJECTION, SOLUTION INTRAVENOUS CONTINUOUS PRN
Status: DISCONTINUED | OUTPATIENT
Start: 2020-10-13 | End: 2020-10-13 | Stop reason: HOSPADM

## 2020-10-13 RX ORDER — PROPOFOL 10 MG/ML
VIAL (ML) INTRAVENOUS AS NEEDED
Status: DISCONTINUED | OUTPATIENT
Start: 2020-10-13 | End: 2020-10-13 | Stop reason: SURG

## 2020-10-13 RX ORDER — ONDANSETRON 2 MG/ML
4 INJECTION INTRAMUSCULAR; INTRAVENOUS ONCE AS NEEDED
Status: DISCONTINUED | OUTPATIENT
Start: 2020-10-13 | End: 2020-10-13 | Stop reason: HOSPADM

## 2020-10-13 RX ORDER — LIDOCAINE HYDROCHLORIDE 10 MG/ML
0.5 INJECTION, SOLUTION EPIDURAL; INFILTRATION; INTRACAUDAL; PERINEURAL ONCE AS NEEDED
Status: CANCELLED | OUTPATIENT
Start: 2020-10-13

## 2020-10-13 RX ADMIN — SODIUM CHLORIDE 500 ML: 9 INJECTION, SOLUTION INTRAVENOUS at 07:53

## 2020-10-13 RX ADMIN — PROPOFOL 200 MG: 10 INJECTION, EMULSION INTRAVENOUS at 09:06

## 2020-10-13 RX ADMIN — PROPOFOL 200 MG: 10 INJECTION, EMULSION INTRAVENOUS at 08:58

## 2020-10-13 RX ADMIN — LIDOCAINE HYDROCHLORIDE 50 MG: 20 INJECTION, SOLUTION INTRAVENOUS at 08:58

## 2020-10-13 RX ADMIN — SODIUM CHLORIDE: 9 INJECTION, SOLUTION INTRAVENOUS at 08:58

## 2020-10-13 NOTE — ANESTHESIA POSTPROCEDURE EVALUATION
"Patient: Ricardo Hugo    Procedure Summary     Date: 10/13/20 Room / Location: [facility] ENDOSCOPY 6 / [facility] ENDOSCOPY    Anesthesia Start: 0858 Anesthesia Stop: 0919    Procedure: COLONOSCOPY WITH ANESTHESIA (N/A ) Diagnosis:       Chronic diarrhea      Crohn's disease of large intestine with other complication (CMS/HCC)      (Chronic diarrhea [K52.9])      (Crohn's disease of large intestine with other complication (CMS/HCC) [K50.118])    Surgeon: Adrien Brewster MD Provider: Dale Garcia CRNA    Anesthesia Type: MAC ASA Status: 3          Anesthesia Type: MAC    Vitals  No vitals data found for the desired time range.          Post Anesthesia Care and Evaluation    Patient location during evaluation: PHASE II  Patient participation: complete - patient participated  Level of consciousness: awake and alert  Pain management: adequate  Airway patency: patent  Anesthetic complications: No anesthetic complications    Cardiovascular status: acceptable  Respiratory status: acceptable  Hydration status: acceptable    Comments: Blood pressure (!) 198/66, pulse 64, temperature 97.7 °F (36.5 °C), temperature source Temporal, resp. rate 20, height 182.9 cm (72\"), weight 69.9 kg (154 lb), SpO2 98 %.    Pt discharged from PACU based on jennifer score >8      "

## 2020-10-13 NOTE — ANESTHESIA PREPROCEDURE EVALUATION
Anesthesia Evaluation     Patient summary reviewed and Nursing notes reviewed   no history of anesthetic complications:  NPO Solid Status: > 8 hours  NPO Liquid Status: > 2 hours           Airway   Mallampati: II  TM distance: >3 FB  Neck ROM: full  Dental    (+) edentulous    Pulmonary    Cardiovascular   Exercise tolerance: good (4-7 METS)    Patient on routine beta blocker and Beta blocker given within 24 hours of surgery    (+) hypertension, CAD, CABG (2004) >6 Months, PVD, hyperlipidemia,       Neuro/Psych  (+) psychiatric history Anxiety,     (-) seizures, TIA, CVA  GI/Hepatic/Renal/Endo    (+)  GERD,  renal disease CRI,   (-) liver disease, diabetes    ROS Comment: crohns disease    Musculoskeletal     Abdominal    Substance History      OB/GYN          Other                        Anesthesia Plan    ASA 3     MAC     intravenous induction     Anesthetic plan, all risks, benefits, and alternatives have been provided, discussed and informed consent has been obtained with: patient.

## 2020-10-13 NOTE — INTERVAL H&P NOTE
H&P updated. The patient was examined and the following changes are noted:  He states he continues not to have any diarrhea.  States any diarrhea has had is with the prep last night.  He presents for colonoscopy today.

## 2020-10-14 LAB
CYTO UR: NORMAL
LAB AP CASE REPORT: NORMAL
LAB AP CLINICAL INFORMATION: NORMAL
PATH REPORT.FINAL DX SPEC: NORMAL
PATH REPORT.GROSS SPEC: NORMAL

## 2020-11-10 ENCOUNTER — OFFICE VISIT (OUTPATIENT)
Dept: OTOLARYNGOLOGY | Age: 70
End: 2020-11-10
Payer: MEDICARE

## 2020-11-10 ENCOUNTER — PROCEDURE VISIT (OUTPATIENT)
Dept: OTOLARYNGOLOGY | Age: 70
End: 2020-11-10
Payer: MEDICARE

## 2020-11-10 VITALS
SYSTOLIC BLOOD PRESSURE: 138 MMHG | WEIGHT: 165 LBS | BODY MASS INDEX: 22.35 KG/M2 | HEIGHT: 72 IN | DIASTOLIC BLOOD PRESSURE: 70 MMHG

## 2020-11-10 DIAGNOSIS — Z96.22 STATUS POST MYRINGOTOMY WITH INSERTION OF TUBE: ICD-10-CM

## 2020-11-10 PROCEDURE — G8427 DOCREV CUR MEDS BY ELIG CLIN: HCPCS | Performed by: OTOLARYNGOLOGY

## 2020-11-10 PROCEDURE — 99212 OFFICE O/P EST SF 10 MIN: CPT | Performed by: OTOLARYNGOLOGY

## 2020-11-10 PROCEDURE — 92553 AUDIOMETRY AIR & BONE: CPT | Performed by: AUDIOLOGIST

## 2020-11-10 PROCEDURE — G8420 CALC BMI NORM PARAMETERS: HCPCS | Performed by: OTOLARYNGOLOGY

## 2020-11-10 PROCEDURE — 1036F TOBACCO NON-USER: CPT | Performed by: OTOLARYNGOLOGY

## 2020-11-10 PROCEDURE — 3017F COLORECTAL CA SCREEN DOC REV: CPT | Performed by: OTOLARYNGOLOGY

## 2020-11-10 PROCEDURE — 1123F ACP DISCUSS/DSCN MKR DOCD: CPT | Performed by: OTOLARYNGOLOGY

## 2020-11-10 PROCEDURE — 92567 TYMPANOMETRY: CPT | Performed by: AUDIOLOGIST

## 2020-11-10 PROCEDURE — G8484 FLU IMMUNIZE NO ADMIN: HCPCS | Performed by: OTOLARYNGOLOGY

## 2020-11-10 PROCEDURE — 4040F PNEUMOC VAC/ADMIN/RCVD: CPT | Performed by: OTOLARYNGOLOGY

## 2020-11-10 RX ORDER — DIPHENHYDRAMINE HCL 25 MG
25 CAPSULE ORAL EVERY 6 HOURS PRN
COMMUNITY

## 2020-11-10 RX ORDER — SODIUM BICARBONATE 325 MG/1
650 TABLET ORAL 2 TIMES DAILY
COMMUNITY

## 2020-11-10 RX ORDER — MELATONIN 10 MG
2 CAPSULE ORAL NIGHTLY
COMMUNITY

## 2020-11-10 RX ORDER — DESOXIMETASONE 2.5 MG/G
CREAM TOPICAL
Qty: 30 G | Refills: 0 | Status: SHIPPED | OUTPATIENT
Start: 2020-11-10 | End: 2020-12-22 | Stop reason: SDUPTHER

## 2020-11-10 RX ORDER — LEVOFLOXACIN 250 MG/1
250 TABLET ORAL DAILY
COMMUNITY
End: 2022-03-01

## 2020-11-10 NOTE — PROGRESS NOTES
65 y.o.  male presents today with ear related complaints. Back on  he had a tube placed in his left ear here in the clinic. He did quite well with this initially observing definite improvement in his hearing. Recently he developed a sensation of fullness in the left ear. He made an appointment for follow-up. Over the past few days however the fullness has resolved. This is actually his first visit since the tube was placed. His original postoperative appointment was postponed so he has had no post procedure hearing testing. Family History   Problem Relation Age of Onset    Colon Cancer Neg Hx     Colon Polyps Neg Hx     Esophageal Cancer Neg Hx     Liver Disease Neg Hx     Liver Cancer Neg Hx     Rectal Cancer Neg Hx     Stomach Cancer Neg Hx      Social History     Socioeconomic History    Marital status:      Spouse name: None    Number of children: None    Years of education: None    Highest education level: None   Occupational History    None   Social Needs    Financial resource strain: None    Food insecurity     Worry: None     Inability: None    Transportation needs     Medical: None     Non-medical: None   Tobacco Use    Smoking status: Former Smoker     Packs/day: 1.00     Years: 12.00     Pack years: 12.00     Last attempt to quit: 3/10/2013     Years since quittin.6    Smokeless tobacco: Never Used   Substance and Sexual Activity    Alcohol use:  Yes     Alcohol/week: 2.0 standard drinks     Types: 2 Cans of beer per week     Comment: DAILY    Drug use: No    Sexual activity: None   Lifestyle    Physical activity     Days per week: None     Minutes per session: None    Stress: None   Relationships    Social connections     Talks on phone: None     Gets together: None     Attends [employer] service: None     Active member of club or organization: None     Attends meetings of clubs or organizations: None     Relationship status: None    Intimate partner violence     Fear of current or ex partner: None     Emotionally abused: None     Physically abused: None     Forced sexual activity: None   Other Topics Concern    None   Social History Narrative    None     Past Medical History:   Diagnosis Date    Arthritis     Asthma     BPPV (benign paroxysmal positional vertigo)     CAD (coronary artery disease)     Colon polyps     Colon polyps     Dizziness     History of alcoholism (HCC)     Hyperlipidemia     Hypertension     Low sodium levels     Otitis     Pharyngitis     Proteinuria     Sebaceous cyst     Sinusitis     URI (upper respiratory infection)      Past Surgical History:   Procedure Laterality Date    COLONOSCOPY  ? 2010    ? Doctor or where    COLONOSCOPY N/A 3/10/2016    Dr Orquidea Pelayo AP x 2 (-) dysplasia, BCM x 2, 5 yr recall    CORONARY ARTERY BYPASS GRAFT      CYST REMOVAL      SEBACEOUS    JOINT REPLACEMENT      RTH    TONSILLECTOMY AND ADENOIDECTOMY      TYMPANOSTOMY TUBE PLACEMENT Left          REVIEW OF SYSTEMS:  all other systems reviewed and are negative  General Health: no change in health status since last visit  Ears: ear pain No Left recent drainage No  Left ear popping/ fullness No  Sensation of fullness has resolved  Hearing: improved: Yes and  Left       Comments:     PHYSICAL EXAM:    /70   Ht 6' (1.829 m)   Wt 165 lb (74.8 kg)   BMI 22.38 kg/m²   Body mass index is 22.38 kg/m².     General Appearance: well developed , well nourished and no distress  Head/ Face: normocephalic and atraumatic  Vocal Quality: good/ normal  Ears: Right Ear: External: external ears normal Otoscopy Ear Canal: canal clear Otoscopy TM: TM's normal and TM's mobile Left Ear: External: external ears normal Otoscopy Ear Canal: extruded tube in canal Otoscopy TM: perforation: central and Small 5 to 10% perforation at anterior-inferior quadrant at tube site  Hearing: see audiogram  Neuro: alert and oriented x3 and cranial nerves II- XII grossly intact  Psych/ Mood: cooperative and no depression, anxiety or agitation    Assessment & Plan:    Problem List Items Addressed This Visit     Status post myringotomy with insertion of tube     Left tube appears to be in process of extruding. Perforation at tube site has not healed. Perforation is small involving about 5% of TM surface. I doubt this will have any significant effect on his hearing and will serve to act as a 'permanent tube'. No orders of the defined types were placed in this encounter. No orders of the defined types were placed in this encounter. Please note that this chart was generated using dragon dictation software. Although every effort was made to ensure the accuracy of this automated transcription, some errors in transcription may have occurred.

## 2020-11-23 ENCOUNTER — OFFICE VISIT (OUTPATIENT)
Dept: GASTROENTEROLOGY | Facility: CLINIC | Age: 70
End: 2020-11-23

## 2020-11-23 VITALS
WEIGHT: 166 LBS | OXYGEN SATURATION: 99 % | HEIGHT: 72 IN | HEART RATE: 53 BPM | TEMPERATURE: 96.4 F | BODY MASS INDEX: 22.48 KG/M2 | SYSTOLIC BLOOD PRESSURE: 160 MMHG | DIASTOLIC BLOOD PRESSURE: 66 MMHG

## 2020-11-23 DIAGNOSIS — K50.118 CROHN'S DISEASE OF LARGE INTESTINE WITH OTHER COMPLICATION (HCC): Primary | ICD-10-CM

## 2020-11-23 PROCEDURE — 99213 OFFICE O/P EST LOW 20 MIN: CPT | Performed by: INTERNAL MEDICINE

## 2020-11-23 NOTE — PROGRESS NOTES
[facility] Gastroenterology    Chief Complaint   Patient presents with   • Follow-up     colonoscopy       Subjective     HPI    Ricardo Hugo is a 70 y.o. male who presents with a chief complaint of Crohn's    He did undergo colonoscopy in October.  See his summary of the pathology and copy of the impression from the colonoscopy below.  Overall it appeared improved.  There is no gross visual evidence of obvious Crohn's disease.  Random biopsies throughout the colon did show evidence of microscopic mild inflammation throughout with mild to moderate in the sigmoid colon.     Currently he feels well.  States he is having 1 formed brown bowel movement a day.  He takes Questran 1 packet daily.  He continues on Apriso 4 tablets daily.  Denies bloody mucus.  Denies cramps.  He has good appetite.  He is eating what he wants to.  He is active.  He has no complaints.  He does tell me he continues to follow-up with nephrology.  He has been told his renal function has been stable.  He is drinking his Gatorade once daily.  He has a follow-up appointment with renal after the holidays.    Colonoscopy October 13, 2020 impression  - The examined portion of the ileum was normal.  - A single non-bleeding colonic angioectasia.  - hemosiderin staining, minimum and improved from July's exam  - The examination was otherwise normal on direct and retroflexion views.  - Diverticulosis in the sigmoid colon.  - Biopsies were taken with a cold forceps from the ascending colon, transverse colon, 40cm, 20cm and  rectum for evaluation of microscopic colitis.  - - No visual evidence of active Crohn's    Pathology showed mild active inflammation on the random biopsies with mild to moderate in the sigmoid colon.      ======================== September 29, 2020 HPI================================  HPI     Ricardo Hugo is a 70 y.o. male who presents with a chief complaint of Crohn's and diarrhea.     He was in the hospital mid-September.   He was there with UTI and acute renal failure.  They attributed his acute renal failure to diarrhea.  But similar to when he was in the hospital in August he told me he was not having that much diarrhea.  When I saw him there in September he states he was maybe having 2-3 bowel movements at most.  He states they were controlled with Questran but he was only taking it once a day.  Previously in August he was not taking the Apriso except for 1 tablet daily.  When we saw him in September he was taken the Apriso 4 tablets daily.  He was diagnosed with urinary tract infection and felt to be may be prostatitis.  He is seeing nephrology.     He comes in for follow-up visit.  He states he is having 1 bowel movement a day.  He describes this as soft mud.  There is no blood or mucus.  No nocturnal bowel movements.  No abdominal cramping.  He is eating well.  He finished his Cipro for his UTI type symptoms 2 days ago.  He saw his primary care provider earlier today who felt everything was stable.  He is due to see nephrology tomorrow.  He is now drinking 2 bottles of Gatorade a day to help with hydration.     He continues on Apriso 4 tablets daily and is taking Questran 1 packet twice a day.       Past Medical History:   Diagnosis Date   • 3-vessel CAD 8/11/2020   • Allergic rhinitis    • Anxiety disorder 4/27/2020   • Asymmetrical sensorineural hearing loss 6/28/2017   • Atherosclerosis of native artery of both lower extremities with intermittent claudication (CMS/[facility]) 7/18/2019   • Avascular necrosis of femoral head, left (CMS/[facility]) 7/11/2020   • Chronic mucoid otitis media    • Chronic rhinitis    • Coronary artery disease     HEART BYPASS 2004   • Crohn's disease of large intestine with other complication (CMS/HCC) 7/30/2020    Chronic diarrhea Colonoscopy July 2020 revealed mild patchy scattered hemosiderin staining with inflammation more so in rectosigmoid area.  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   • Iron deficiency anemia 7/14/2020   • Mixed hearing loss of left ear    • PAD (peripheral artery disease) (CMS/[facility]) 8/11/2020   • Perianal abscess    • Pernicious anemia 8/17/2020   • Personal history of alcoholism (CMS/[facility]) 8/11/2020   • Prostatic hypertrophy 8/11/2020   • Sensorineural hearing loss    • Tinnitus    • Ventricular tachycardia, nonsustained (CMS/[facility]) 7/14/2020   • Weight loss 7/11/2020       Past Surgical History:   Procedure Laterality Date   • COLONOSCOPY N/A 7/2/2020    Procedure: COLONOSCOPY WITH ANESTHESIA;  Surgeon: Adrien Brewster MD;  Location: [facility] ENDOSCOPY;  Service: Gastroenterology;  Laterality: N/A;  pre op: diarrhea  post op: polyps  PCP: Joe Velasco MD   • COLONOSCOPY N/A 10/13/2020    Procedure: COLONOSCOPY WITH ANESTHESIA;  Surgeon: Adrien Brewster MD;  Location: [facility] ENDOSCOPY;  Service: Gastroenterology;  Laterality: N/A;  Pre: Chronic Diarrhea, Crohn's  Post: AVM  Dr. Neftali Velasco  CO2 Inflation Used   • CORONARY ARTERY BYPASS GRAFT     • INCISION AND DRAINAGE PERIRECTAL ABSCESS N/A 3/3/2017    Procedure: INCISION AND DRAINAGE OF JEET ANAL ABSCESS;  Surgeon: Lynette Smith MD;  Location: [facility] OR;  Service:    • MYRINGOTOMY W/ TUBES Left 04/17/2017    06/10/2016   • TONSILLECTOMY     • TOTAL HIP ARTHROPLASTY           Current Outpatient Medications:   •  albuterol sulfate  (90 Base) MCG/ACT inhaler, USE 2 INHALATIONS FOUR TIMES A DAY AS NEEDED, Disp: 20.1 g, Rfl: 3  •  amLODIPine (NORVASC) 10 MG tablet, Take 10 mg by mouth Daily., Disp: , Rfl:   •  aspirin (ASPIR) 81 MG EC tablet, Take 81 mg by mouth Daily.,  Disp: , Rfl:   •  Aspirin-Acetaminophen-Caffeine (EXCEDRIN EXTRA STRENGTH PO), Take  by mouth., Disp: , Rfl:   •  azelastine (ASTELIN) 0.1 % nasal spray, 1 spray into the nostril(s) as directed by provider 2 (Two) Times a Day As Needed for Rhinitis., Disp: , Rfl:   •  cholestyramine (QUESTRAN) 4 g packet, Take 1 packet by mouth Every 12 (Twelve) Hours., Disp: 60 packet, Rfl: 2  •  cloNIDine (CATAPRES) 0.2 MG tablet, TAKE 3 TABLETS DAILY, Disp: 270 tablet, Rfl: 3  •  desoximetasone (TOPICORT) 0.25 % cream, APPLY EXTERNALLY TO THE AFFECTED AREA TWICE DAILY AS NEEDED, Disp: 30 g, Rfl: 0  •  diphenhydrAMINE (Benadryl Allergy) 25 mg capsule, Take 25 mg by mouth Every 6 (Six) Hours As Needed for Itching., Disp: , Rfl:   •  fexofenadine (ALLEGRA) 180 MG tablet, Take 180 mg by mouth Daily., Disp: , Rfl:   •  fluticasone (FLONASE) 50 MCG/ACT nasal spray, 2 sprays into the nostril(s) as directed by provider Daily As Needed for Rhinitis., Disp: , Rfl:   •  levoFLOXacin (LEVAQUIN) 250 MG tablet, Take 250 mg by mouth 4 (Four) Times a Day., Disp: , Rfl:   •  magnesium chloride ER 64 MG DR tablet, Take 143 mg by mouth Daily., Disp: , Rfl:   •  Melatonin 10 MG capsule, Take 2 capsules by mouth Every Night., Disp: , Rfl:   •  mesalamine (APRISO) 0.375 g 24 hr capsule, Take 4 capsules by mouth Daily., Disp: 120 capsule, Rfl: 3  •  metoprolol succinate XL (TOPROL-XL) 25 MG 24 hr tablet, Take 1 tablet by mouth Daily., Disp: 30 tablet, Rfl: 5  •  Multiple Vitamins-Minerals (PRESERVISION/LUTEIN) capsule, Take 1 capsule by mouth 2 (two) times a day., Disp: , Rfl:   •  omeprazole (priLOSEC) 20 MG capsule, Take 20 mg by mouth Daily., Disp: , Rfl:   •  potassium chloride 10 MEQ CR tablet, Take 10 mEq by mouth 2 (Two) Times a Day., Disp: , Rfl:   •  sodium bicarbonate 650 MG tablet, Take 1 tablet by mouth 2 (Two) Times a Day., Disp: 60 tablet, Rfl: 2  •  amLODIPine (NORVASC) 10 MG tablet, Take 10 mg by mouth Daily., Disp: , Rfl:   •   polyethylene glycol-electrolytes (Nulytely with Flavor Packs) 420 g solution, Take 4,000 mL by mouth See Admin Instructions., Disp: 4000 mL, Rfl: 0    Allergies   Allergen Reactions   • Lortab [Hydrocodone-Acetaminophen] Other (See Comments) and Hallucinations     CLOSTROPHOBIC   • Allopurinol Other (See Comments)     Pain on right side       Social History     Socioeconomic History   • Marital status:      Spouse name: Not on file   • Number of children: Not on file   • Years of education: Not on file   • Highest education level: Not on file   Tobacco Use   • Smoking status: Former Smoker     Quit date: 10/13/2013     Years since quittin.1   • Smokeless tobacco: Never Used   • Tobacco comment: quit    Substance and Sexual Activity   • Alcohol use: Yes     Frequency: Monthly or less     Comment: rare   • Drug use: No   • Sexual activity: Defer       Family History   Problem Relation Age of Onset   • No Known Problems Mother    • No Known Problems Father    • Colon cancer Neg Hx    • Colon polyps Neg Hx        Review of Systems  General no fever chills or sweats weight stable  Gastrointestinal: Not present-abdominal pain, constipation, diarrhea, dysphagia, hematemesis, melena, odynophagia, nausea, vomiting, pyrosis, regurgitation, hematochezia,    Objective     Vitals:    20 1425   BP: 160/66   Pulse: 53   Temp: 96.4 °F (35.8 °C)   SpO2: 99%       Physical Exam  No acute distress. Vital signs as documented.  Sclera anicteric.  Neck without noticeable JVD. Lungs clear to auscultation. Heart exam notable for regular rhythm, normal sounds. Abdomen is soft, nontender, non distended, normal bowel sounds and without evidence of organomegaly, masses.  Neuro alert, moves extremities.        Assessment/Plan   Problem List Items Addressed This Visit        Digestive    Crohn's disease of large intestine with other complication (CMS/HCC) - Primary    Overview     Chronic diarrhea Colonoscopy 2020  revealed mild patchy scattered hemosiderin staining with inflammation more so in rectosigmoid area.  [facility] lab IBD first step consistent with Crohn's.    T spot and hepatitis panel -September 16, 2020                 He is relatively asymptomatic on Apriso and Crohn's.  Colonoscopy was unremarkable for gross evidence of active Crohn's but there is microscopic inflammation present.  We talked about the options of step up therapy versus continuing current therapy.  With no symptoms he wants to continue current therapy and I think that is quite reasonable.  Recommend hydration continue to follow nephrology's recommendation.  Advised that if he starts having diarrhea abdominal cramping or any GI symptoms to contact me otherwise we will continue with his current medications and see him back in the office in about 4 months.  He is in agreement with this approach.    Continue ongoing management by primary care provider and other specialists.     Patient's Body mass index is 22.51 kg/m². BMI is within normal parameters. No follow-up required..        EMR Dragon/transcription disclaimer:  Much of this encounter note is electronic transcription/translation of spoken language to printed text.  The electronic translation of spoken language may be erroneous, or at times, nonsensical words or phrases may be inadvertently transcribed.  Although I have reviewed the note for such errors, some may still exist.    Adrien Brewster MD  15:36 CST  11/23/20

## 2020-12-02 RX ORDER — METOPROLOL SUCCINATE 25 MG/1
25 TABLET, EXTENDED RELEASE ORAL DAILY
Qty: 90 TABLET | Refills: 3 | Status: SHIPPED | OUTPATIENT
Start: 2020-12-02 | End: 2020-12-15 | Stop reason: SDUPTHER

## 2020-12-02 RX ORDER — SODIUM BICARBONATE 650 MG/1
650 TABLET ORAL 2 TIMES DAILY
Qty: 180 TABLET | Refills: 3 | Status: ON HOLD | OUTPATIENT
Start: 2020-12-02 | End: 2021-11-03 | Stop reason: SDUPTHER

## 2020-12-02 NOTE — TELEPHONE ENCOUNTER
Pt stopped by dropping off medication bottles requesting refills if Dr. Velasco feels PT needs to continue.  PT wants Dr. Velasco to know his heart is no longer racing.    METOPROLOL ER - SUCCINATE 25MG TABS    SODIUM BICARBONATE  10GR 650MG TABS. TAKE 1 TABLET BY MOUTH TWICE MAXIMINO

## 2020-12-03 RX ORDER — AZELASTINE 1 MG/ML
SPRAY, METERED NASAL
Qty: 90 ML | Refills: 3 | Status: SHIPPED | OUTPATIENT
Start: 2020-12-03 | End: 2021-06-04 | Stop reason: SDUPTHER

## 2020-12-14 RX ORDER — CHOLESTYRAMINE 4 G/9G
1 POWDER, FOR SUSPENSION ORAL EVERY 12 HOURS SCHEDULED
Qty: 180 PACKET | Refills: 3 | Status: SHIPPED | OUTPATIENT
Start: 2020-12-14 | End: 2020-12-15 | Stop reason: SDUPTHER

## 2020-12-15 RX ORDER — MESALAMINE 0.38 G/1
1.5 CAPSULE, EXTENDED RELEASE ORAL DAILY
Qty: 360 CAPSULE | Refills: 3 | Status: ON HOLD | OUTPATIENT
Start: 2020-12-15 | End: 2021-11-01

## 2020-12-15 RX ORDER — CHOLESTYRAMINE 4 G/9G
1 POWDER, FOR SUSPENSION ORAL DAILY
Qty: 90 PACKET | Refills: 3 | Status: SHIPPED | OUTPATIENT
Start: 2020-12-15 | End: 2021-04-08 | Stop reason: SDUPTHER

## 2020-12-15 RX ORDER — METOPROLOL SUCCINATE 25 MG/1
25 TABLET, EXTENDED RELEASE ORAL DAILY
Qty: 90 TABLET | Refills: 3 | Status: SHIPPED | OUTPATIENT
Start: 2020-12-15 | End: 2021-11-09

## 2020-12-15 NOTE — TELEPHONE ENCOUNTER
Sent Rx to [facility] but they have told him they do not have it in stock.  Pt says [facility] has it.  Needs GI meds sent to mail order, states Dr. Brewster wants him to continue.  Have pended those as well if appropriate.

## 2020-12-21 ENCOUNTER — TELEPHONE (OUTPATIENT)
Dept: INTERNAL MEDICINE | Facility: CLINIC | Age: 70
End: 2020-12-21

## 2020-12-21 NOTE — TELEPHONE ENCOUNTER
[facility] CALLED AND STATED THEY HAVE A QUESTION ABOUT A  PRESCRIPTION REFILL NEEDING APPROVAL, 90 SUPPLY ON DESOXIMETASONE 0.25%. PLEASE ADVISE.     BEST CALL BACK [phone]

## 2020-12-21 NOTE — TELEPHONE ENCOUNTER
[facility] called in requesting refill:    Potassium Chloride 10 MEQ  Prilosec 20 MG  Allupirinol (did not see on list)    Caller is requesting 90 day supply on all meds.

## 2020-12-22 RX ORDER — DESOXIMETASONE 2.5 MG/G
CREAM TOPICAL 2 TIMES DAILY
Qty: 30 G | Refills: 0 | Status: SHIPPED | OUTPATIENT
Start: 2020-12-22 | End: 2021-03-09

## 2020-12-22 RX ORDER — POTASSIUM CHLORIDE 750 MG/1
10 TABLET, FILM COATED, EXTENDED RELEASE ORAL 2 TIMES DAILY
Qty: 180 TABLET | Refills: 3 | Status: SHIPPED | OUTPATIENT
Start: 2020-12-22 | End: 2021-11-29

## 2020-12-22 RX ORDER — OMEPRAZOLE 20 MG/1
20 CAPSULE, DELAYED RELEASE ORAL DAILY
Qty: 90 CAPSULE | Refills: 3 | Status: SHIPPED | OUTPATIENT
Start: 2020-12-22 | End: 2021-11-03 | Stop reason: HOSPADM

## 2020-12-22 NOTE — TELEPHONE ENCOUNTER
"Called pt and he says he was given Allopurinol in the past by a doctor in the hospital but he \"definitely does not want that Rx\".  "

## 2021-03-09 RX ORDER — DESOXIMETASONE 2.5 MG/G
CREAM TOPICAL
Qty: 30 G | Refills: 3 | Status: SHIPPED | OUTPATIENT
Start: 2021-03-09 | End: 2021-09-09 | Stop reason: ALTCHOICE

## 2021-03-11 ENCOUNTER — LAB (OUTPATIENT)
Dept: INTERNAL MEDICINE | Facility: CLINIC | Age: 71
End: 2021-03-11

## 2021-03-11 DIAGNOSIS — R73.01 IMPAIRED FASTING BLOOD SUGAR: ICD-10-CM

## 2021-03-11 DIAGNOSIS — Z12.5 SCREENING PSA (PROSTATE SPECIFIC ANTIGEN): ICD-10-CM

## 2021-03-11 DIAGNOSIS — I10 ACCELERATED HYPERTENSION: ICD-10-CM

## 2021-03-11 DIAGNOSIS — I10 ESSENTIAL HYPERTENSION: Primary | ICD-10-CM

## 2021-03-11 DIAGNOSIS — D51.0 PERNICIOUS ANEMIA: ICD-10-CM

## 2021-03-11 DIAGNOSIS — E78.5 HYPERLIPIDEMIA, UNSPECIFIED HYPERLIPIDEMIA TYPE: ICD-10-CM

## 2021-03-11 DIAGNOSIS — Z11.59 NEED FOR HEPATITIS C SCREENING TEST: ICD-10-CM

## 2021-03-12 LAB
ALBUMIN SERPL-MCNC: 3.9 G/DL (ref 3.5–5.2)
ALBUMIN/GLOB SERPL: 1.3 G/DL
ALP SERPL-CCNC: 218 U/L (ref 39–117)
ALT SERPL-CCNC: 16 U/L (ref 1–41)
APPEARANCE UR: CLEAR
AST SERPL-CCNC: 19 U/L (ref 1–40)
BACTERIA #/AREA URNS HPF: ABNORMAL /HPF
BASOPHILS # BLD AUTO: 0.05 10*3/MM3 (ref 0–0.2)
BASOPHILS NFR BLD AUTO: 1 % (ref 0–1.5)
BILIRUB SERPL-MCNC: 0.3 MG/DL (ref 0–1.2)
BILIRUB UR QL STRIP: NEGATIVE
BUN SERPL-MCNC: 40 MG/DL (ref 8–23)
BUN/CREAT SERPL: 18 (ref 7–25)
CALCIUM SERPL-MCNC: 8.5 MG/DL (ref 8.6–10.5)
CASTS URNS MICRO: ABNORMAL
CHLORIDE SERPL-SCNC: 107 MMOL/L (ref 98–107)
CHOLEST SERPL-MCNC: 129 MG/DL (ref 0–200)
CO2 SERPL-SCNC: 20.5 MMOL/L (ref 22–29)
COLOR UR: YELLOW
CREAT SERPL-MCNC: 2.22 MG/DL (ref 0.76–1.27)
EOSINOPHIL # BLD AUTO: 0.16 10*3/MM3 (ref 0–0.4)
EOSINOPHIL NFR BLD AUTO: 3.2 % (ref 0.3–6.2)
EPI CELLS #/AREA URNS HPF: ABNORMAL /HPF
ERYTHROCYTE [DISTWIDTH] IN BLOOD BY AUTOMATED COUNT: 12.6 % (ref 12.3–15.4)
GLOBULIN SER CALC-MCNC: 3.1 GM/DL
GLUCOSE SERPL-MCNC: 152 MG/DL (ref 65–99)
GLUCOSE UR QL: NEGATIVE
HBA1C MFR BLD: 4.9 % (ref 4.8–5.6)
HCT VFR BLD AUTO: 30.1 % (ref 37.5–51)
HCV AB S/CO SERPL IA: <0.1 S/CO RATIO (ref 0–0.9)
HDLC SERPL-MCNC: 44 MG/DL (ref 40–60)
HGB BLD-MCNC: 9.7 G/DL (ref 13–17.7)
HGB UR QL STRIP: NEGATIVE
IMM GRANULOCYTES # BLD AUTO: 0.01 10*3/MM3 (ref 0–0.05)
IMM GRANULOCYTES NFR BLD AUTO: 0.2 % (ref 0–0.5)
KETONES UR QL STRIP: NEGATIVE
LDLC SERPL CALC-MCNC: 66 MG/DL (ref 0–100)
LEUKOCYTE ESTERASE UR QL STRIP: NEGATIVE
LYMPHOCYTES # BLD AUTO: 1.28 10*3/MM3 (ref 0.7–3.1)
LYMPHOCYTES NFR BLD AUTO: 25.3 % (ref 19.6–45.3)
MCH RBC QN AUTO: 31.9 PG (ref 26.6–33)
MCHC RBC AUTO-ENTMCNC: 32.2 G/DL (ref 31.5–35.7)
MCV RBC AUTO: 99 FL (ref 79–97)
MONOCYTES # BLD AUTO: 0.52 10*3/MM3 (ref 0.1–0.9)
MONOCYTES NFR BLD AUTO: 10.3 % (ref 5–12)
NEUTROPHILS # BLD AUTO: 3.04 10*3/MM3 (ref 1.7–7)
NEUTROPHILS NFR BLD AUTO: 60 % (ref 42.7–76)
NITRITE UR QL STRIP: NEGATIVE
NRBC BLD AUTO-RTO: 0 /100 WBC (ref 0–0.2)
PH UR STRIP: 6 [PH] (ref 5–8)
PLATELET # BLD AUTO: 103 10*3/MM3 (ref 140–450)
POTASSIUM SERPL-SCNC: 4.4 MMOL/L (ref 3.5–5.2)
PROT SERPL-MCNC: 7 G/DL (ref 6–8.5)
PROT UR QL STRIP: ABNORMAL
PSA SERPL-MCNC: 0.75 NG/ML (ref 0–4)
RBC # BLD AUTO: 3.04 10*6/MM3 (ref 4.14–5.8)
RBC #/AREA URNS HPF: ABNORMAL /HPF
SODIUM SERPL-SCNC: 139 MMOL/L (ref 136–145)
SP GR UR: 1.01 (ref 1–1.03)
TRIGL SERPL-MCNC: 105 MG/DL (ref 0–150)
TSH SERPL DL<=0.005 MIU/L-ACNC: 8.01 UIU/ML (ref 0.27–4.2)
URATE SERPL-MCNC: 8.7 MG/DL (ref 3.4–7)
UROBILINOGEN UR STRIP-MCNC: ABNORMAL MG/DL
VLDLC SERPL CALC-MCNC: 19 MG/DL (ref 5–40)
WBC # BLD AUTO: 5.06 10*3/MM3 (ref 3.4–10.8)
WBC #/AREA URNS HPF: ABNORMAL /HPF

## 2021-03-17 ENCOUNTER — OFFICE VISIT (OUTPATIENT)
Dept: INTERNAL MEDICINE | Facility: CLINIC | Age: 71
End: 2021-03-17

## 2021-03-17 ENCOUNTER — HOSPITAL ENCOUNTER (OUTPATIENT)
Dept: ULTRASOUND IMAGING | Facility: HOSPITAL | Age: 71
Discharge: HOME | End: 2021-03-17
Admitting: INTERNAL MEDICINE

## 2021-03-17 ENCOUNTER — TELEPHONE (OUTPATIENT)
Dept: INTERNAL MEDICINE | Facility: CLINIC | Age: 71
End: 2021-03-17

## 2021-03-17 ENCOUNTER — APPOINTMENT (OUTPATIENT)
Dept: ULTRASOUND IMAGING | Facility: HOSPITAL | Age: 71
End: 2021-03-17

## 2021-03-17 VITALS
OXYGEN SATURATION: 99 % | DIASTOLIC BLOOD PRESSURE: 72 MMHG | TEMPERATURE: 97.3 F | SYSTOLIC BLOOD PRESSURE: 150 MMHG | BODY MASS INDEX: 22.48 KG/M2 | WEIGHT: 166 LBS | HEIGHT: 72 IN | HEART RATE: 60 BPM

## 2021-03-17 DIAGNOSIS — N18.4 CKD (CHRONIC KIDNEY DISEASE) STAGE 4, GFR 15-29 ML/MIN (HCC): ICD-10-CM

## 2021-03-17 DIAGNOSIS — E03.9 ACQUIRED HYPOTHYROIDISM: ICD-10-CM

## 2021-03-17 DIAGNOSIS — R09.89 BRUIT OF RIGHT CAROTID ARTERY: ICD-10-CM

## 2021-03-17 DIAGNOSIS — Z00.00 WELLNESS EXAMINATION: Primary | ICD-10-CM

## 2021-03-17 DIAGNOSIS — I65.23 CAROTID STENOSIS, BILATERAL: Primary | ICD-10-CM

## 2021-03-17 DIAGNOSIS — K50.118 CROHN'S DISEASE OF LARGE INTESTINE WITH OTHER COMPLICATION (HCC): ICD-10-CM

## 2021-03-17 DIAGNOSIS — D63.8 ANEMIA, CHRONIC DISEASE: ICD-10-CM

## 2021-03-17 PROBLEM — IMO0001 ASYMMETRICAL HEARING LOSS OF LEFT EAR: Status: ACTIVE | Noted: 2020-07-24

## 2021-03-17 PROCEDURE — 93880 EXTRACRANIAL BILAT STUDY: CPT

## 2021-03-17 PROCEDURE — G0439 PPPS, SUBSEQ VISIT: HCPCS | Performed by: INTERNAL MEDICINE

## 2021-03-17 RX ORDER — LEVOTHYROXINE SODIUM 0.05 MG/1
50 TABLET ORAL DAILY
Qty: 30 TABLET | Refills: 11 | Status: SHIPPED | OUTPATIENT
Start: 2021-03-17 | End: 2021-05-07 | Stop reason: DRUGHIGH

## 2021-03-17 RX ORDER — ERGOCALCIFEROL 1.25 MG/1
50000 CAPSULE ORAL WEEKLY
COMMUNITY
End: 2021-06-30 | Stop reason: SDUPTHER

## 2021-03-17 RX ORDER — VALSARTAN 160 MG/1
1 TABLET ORAL DAILY
COMMUNITY
Start: 2021-03-16 | End: 2021-03-30

## 2021-03-17 NOTE — TELEPHONE ENCOUNTER
----- Message from Joe Velasco MD sent at 3/17/2021  3:15 PM CDT -----  He has a greater than 70% stenosis of the right carotid 50 to 70% stenosis of the left I would like for him to begin seeing vascular surgery.  Refer to [facility]

## 2021-03-17 NOTE — PROGRESS NOTES
The ABCs of the Annual Wellness Visit  Subsequent Medicare Wellness Visit    Chief Complaint   Patient presents with   • Medicare Wellness-subsequent   • fluid sack on face     under right eye poped up about 2 weeks ago        Subjective   History of Present Illness:  Ricardo Hugo is a 71 y.o. male who presents for a Subsequent Medicare Wellness Visit.    HEALTH RISK ASSESSMENT    Recent Hospitalizations:  Recently treated at the following:  [facility]    Current Medical Providers:  Patient Care Team:  Joe Velasco MD as PCP - General (Internal Medicine)  Adrien Brewster MD as Consulting Physician (Gastroenterology)    Smoking Status:  Social History     Tobacco Use   Smoking Status Former Smoker   • Quit date: 10/13/2013   • Years since quittin.4   Smokeless Tobacco Never Used   Tobacco Comment    quit        Alcohol Consumption:  Social History     Substance and Sexual Activity   Alcohol Use Yes    Comment: rare       Depression Screen:   PHQ-2/PHQ-9 Depression Screening 3/17/2021   Little interest or pleasure in doing things 0   Feeling down, depressed, or hopeless 0   Total Score 0       Fall Risk Screen:  RONNAADI Fall Risk Assessment was completed, and patient is at LOW risk for falls.Assessment completed on:3/17/2021    Health Habits and Functional and Cognitive Screening:  Functional & Cognitive Status 3/17/2021   Do you have difficulty preparing food and eating? No   Do you have difficulty bathing yourself, getting dressed or grooming yourself? No   Do you have difficulty using the toilet? No   Do you have difficulty moving around from place to place? No   Do you have trouble with steps or getting out of a bed or a chair? No   Current Diet Well Balanced Diet   Dental Exam Up to date   Eye Exam Up to date   Exercise (times per week) 0 times per week   Current Exercise Activities Include No Regular Exercise   Do you need help using the phone?  No   Are you deaf or do you have  serious difficulty hearing?  No   Do you need help with transportation? No   Do you need help shopping? No   Do you need help preparing meals?  No   Do you need help with housework?  No   Do you need help with laundry? No   Do you need help taking your medications? No   Do you need help managing money? No   Do you ever drive or ride in a car without wearing a seat belt? No   Have you felt unusual stress, anger or loneliness in the last month? No   Who do you live with? Alone   If you need help, do you have trouble finding someone available to you? No   Have you been bothered in the last four weeks by sexual problems? No   Do you have difficulty concentrating, remembering or making decisions? No         Does the patient have evidence of cognitive impairment? No    Asprin use counseling:Does not need ASA (and currently is not on it)    Age-appropriate Screening Schedule:  Refer to the list below for future screening recommendations based on patient's age, sex and/or medical conditions. Orders for these recommended tests are listed in the plan section. The patient has been provided with a written plan.    Health Maintenance   Topic Date Due   • TDAP/TD VACCINES (1 - Tdap) Never done   • ZOSTER VACCINE (2 of 3) 02/22/2015   • LIPID PANEL  03/11/2022   • COLONOSCOPY  10/13/2023   • INFLUENZA VACCINE  Completed          The following portions of the patient's history were reviewed and updated as appropriate: allergies, current medications, past family history, past medical history, past social history, past surgical history and problem list.    Outpatient Medications Prior to Visit   Medication Sig Dispense Refill   • albuterol sulfate  (90 Base) MCG/ACT inhaler USE 2 INHALATIONS FOUR TIMES A DAY AS NEEDED 20.1 g 3   • amLODIPine (NORVASC) 10 MG tablet Take 10 mg by mouth Daily.     • aspirin (ASPIR) 81 MG EC tablet Take 81 mg by mouth Daily.     • Aspirin-Acetaminophen-Caffeine (EXCEDRIN EXTRA STRENGTH PO) Take   by mouth.     • azelastine (ASTELIN) 0.1 % nasal spray USE 1 SPRAY IN EACH NOSTRIL TWICE A DAY AS NEEDED 90 mL 3   • cholestyramine (QUESTRAN) 4 g packet Take 1 packet by mouth Daily. 90 packet 3   • cloNIDine (CATAPRES) 0.2 MG tablet TAKE 3 TABLETS DAILY 270 tablet 3   • desoximetasone (TOPICORT) 0.25 % cream APPLY TO THE AFFECTED AREA TWICE A DAY AS NEEDED 30 g 3   • diphenhydrAMINE (Benadryl Allergy) 25 mg capsule Take 25 mg by mouth Every 6 (Six) Hours As Needed for Itching.     • fexofenadine (ALLEGRA) 180 MG tablet Take 180 mg by mouth Daily.     • fluticasone (FLONASE) 50 MCG/ACT nasal spray 2 sprays into the nostril(s) as directed by provider Daily As Needed for Rhinitis.     • magnesium chloride ER 64 MG DR tablet Take 143 mg by mouth Daily.     • Melatonin 10 MG capsule Take 2 capsules by mouth Every Night.     • mesalamine (APRISO) 0.375 g 24 hr capsule Take 4 capsules by mouth Daily. 360 capsule 3   • metoprolol succinate XL (TOPROL-XL) 25 MG 24 hr tablet Take 1 tablet by mouth Daily. 90 tablet 3   • Multiple Vitamins-Minerals (PRESERVISION/LUTEIN) capsule Take 1 capsule by mouth 2 (two) times a day.     • omeprazole (priLOSEC) 20 MG capsule Take 1 capsule by mouth Daily. 90 capsule 3   • potassium chloride 10 MEQ CR tablet Take 1 tablet by mouth 2 (Two) Times a Day. 180 tablet 3   • sodium bicarbonate 650 MG tablet Take 1 tablet by mouth 2 (Two) Times a Day. 180 tablet 3   • valsartan (DIOVAN) 160 MG tablet Take 1 tablet by mouth Daily.     • vitamin D (ERGOCALCIFEROL) 1.25 MG (72025 UT) capsule capsule Take 50,000 Units by mouth 1 (One) Time Per Week.     • amLODIPine (NORVASC) 10 MG tablet Take 10 mg by mouth Daily.     • levoFLOXacin (LEVAQUIN) 250 MG tablet Take 250 mg by mouth 4 (Four) Times a Day.     • polyethylene glycol-electrolytes (Nulytely with Flavor Packs) 420 g solution Take 4,000 mL by mouth See Admin Instructions. 4000 mL 0     No facility-administered medications prior to visit.        Patient Active Problem List   Diagnosis   • Chronic rhinitis   • Acute renal failure superimposed on stage 3 chronic kidney disease (CMS/HCC)   • Hyponatremia   • Acute cystitis   • Metabolic acidosis, increased anion gap   • Hypomagnesemia   • Hypokalemia   • Moderate malnutrition (CMS/[facility])   • Essential hypertension   • Sepsis with acute renal failure (CMS/[facility])   • Enterocolitis   • Chronic diarrhea   • UTI (urinary tract infection), bacterial   • Crohn's disease of large intestine with other complication (CMS/[facility])   • Asymmetrical hearing loss of left ear   • Anemia, chronic disease   • CKD (chronic kidney disease) stage 4, GFR 15-29 ml/min (CMS/[facility])   • Bruit of right carotid artery   • Wellness examination       Advanced Care Planning:  ACP discussion was held with the patient during this visit. Patient has an advance directive in EMR which is still valid.     Review of Systems   Constitutional: Negative for activity change, appetite change and chills.   HENT: Positive for hearing loss. Negative for congestion, ear pain and facial swelling.    Eyes: Negative for pain, discharge and itching.   Respiratory: Negative for apnea, cough and shortness of breath.    Cardiovascular: Negative for chest pain, palpitations and leg swelling.   Gastrointestinal: Negative for abdominal distention, abdominal pain and anal bleeding.   Endocrine: Negative for cold intolerance and heat intolerance.   Genitourinary: Negative for difficulty urinating, dysuria and flank pain.   Musculoskeletal: Negative for arthralgias, back pain and joint swelling.   Skin: Negative for color change, pallor and rash.   Allergic/Immunologic: Negative for environmental allergies and food allergies.   Neurological: Negative for dizziness and facial asymmetry.   Hematological: Negative for adenopathy. Does not bruise/bleed easily.   Psychiatric/Behavioral: Negative for agitation, behavioral problems and confusion.       Compared to one year ago,  "the patient feels his physical health is the same.  Compared to one year ago, the patient feels his mental health is the same.    Reviewed chart for potential of high risk medication in the elderly: yes  Reviewed chart for potential of harmful drug interactions in the elderly:yes    Objective         Vitals:    03/17/21 1027   BP: 150/72   BP Location: Left arm   Patient Position: Sitting   Cuff Size: Adult   Pulse: 60   Temp: 97.3 °F (36.3 °C)   TempSrc: Temporal   SpO2: 99%   Weight: 75.3 kg (166 lb)   Height: 182.9 cm (72\")   PainSc: 0-No pain       Body mass index is 22.51 kg/m².  Discussed the patient's BMI with him. The BMI is in the acceptable range.    Physical Exam  Vitals and nursing note reviewed.   Constitutional:       General: He is not in acute distress.     Appearance: Normal appearance. He is well-developed.   HENT:      Head: Normocephalic and atraumatic.      Right Ear: External ear normal.      Left Ear: External ear normal.      Nose: Nose normal.   Eyes:      Extraocular Movements: Extraocular movements intact.      Conjunctiva/sclera: Conjunctivae normal.      Pupils: Pupils are equal, round, and reactive to light.   Neck:      Vascular: Carotid bruit ( Right) present.   Cardiovascular:      Rate and Rhythm: Normal rate and regular rhythm.      Pulses: Normal pulses.      Heart sounds: Normal heart sounds.   Pulmonary:      Effort: Pulmonary effort is normal.      Breath sounds: Normal breath sounds.   Abdominal:      General: Bowel sounds are normal.      Palpations: Abdomen is soft.   Musculoskeletal:         General: Normal range of motion.      Cervical back: Normal range of motion and neck supple. No rigidity. No muscular tenderness.   Skin:     General: Skin is warm and dry.   Neurological:      General: No focal deficit present.      Mental Status: He is alert and oriented to person, place, and time.   Psychiatric:         Mood and Affect: Mood normal.         Behavior: Behavior normal. "         Lab Results   Component Value Date     (H) 03/11/2021    CHLPL 129 03/11/2021    TRIG 105 03/11/2021    HDL 44 03/11/2021    LDL 66 03/11/2021    VLDL 19 03/11/2021    HGBA1C 4.90 03/11/2021        Assessment/Plan   Medicare Risks and Personalized Health Plan  CMS Preventative Services Quick Reference  Advance Directive Discussion  Colon Cancer Screening  Immunizations Discussed/Encouraged (specific immunizations; Shingrix )  Osteoprorosis Risk    The above risks/problems have been discussed with the patient.  Pertinent information has been shared with the patient in the After Visit Summary.  Follow up plans and orders are seen below in the Assessment/Plan Section.    Diagnoses and all orders for this visit:    1. Wellness examination (Primary)    2. Bruit of right carotid artery  -     US Carotid Bilateral; Future    3. CKD (chronic kidney disease) stage 4, GFR 15-29 ml/min (CMS/[facility])    4. Anemia, chronic disease    5. Crohn's disease of large intestine with other complication (CMS/[facility])    6. Acquired hypothyroidism  -     TSH; Future  -     T4, Free; Future  -     T3, Free; Future    Other orders  -     levothyroxine (Synthroid) 50 MCG tablet; Take 1 tablet by mouth Daily.  Dispense: 30 tablet; Refill: 11      Follow Up:  Return in about 6 months (around 9/17/2021).  This point reviewed patient's laboratory however he remains significantly anemic which appears to be of chronic disease at this point.  He has an appointment coming up with renal for his chronic kidney disease I suggested he talk to them about that he may need referral to hematology for periodic Procrit injections.  His TSH was elevated he certainly has what appears to be some hypothyroidism I am starting him on low-dose today we will recheck his thyroid function in 6 weeks.  Patient has a very loud right carotid bruit which needs vascular studies.    An After Visit Summary and PPPS were given to the patient.

## 2021-03-17 NOTE — TELEPHONE ENCOUNTER
Salma with [facility] called to inform us the US Carotid from today shows some stenosis and will be sending report. Informed nurse.

## 2021-03-18 ENCOUNTER — TELEPHONE (OUTPATIENT)
Dept: VASCULAR SURGERY | Facility: CLINIC | Age: 71
End: 2021-03-18

## 2021-03-22 ENCOUNTER — OFFICE VISIT (OUTPATIENT)
Dept: GASTROENTEROLOGY | Facility: CLINIC | Age: 71
End: 2021-03-22

## 2021-03-22 VITALS
BODY MASS INDEX: 23.7 KG/M2 | DIASTOLIC BLOOD PRESSURE: 58 MMHG | SYSTOLIC BLOOD PRESSURE: 138 MMHG | HEIGHT: 72 IN | HEART RATE: 57 BPM | WEIGHT: 175 LBS | OXYGEN SATURATION: 98 % | TEMPERATURE: 96.9 F

## 2021-03-22 DIAGNOSIS — K52.9 CHRONIC DIARRHEA: ICD-10-CM

## 2021-03-22 DIAGNOSIS — K50.118 CROHN'S DISEASE OF LARGE INTESTINE WITH OTHER COMPLICATION (HCC): Primary | ICD-10-CM

## 2021-03-22 PROCEDURE — 99213 OFFICE O/P EST LOW 20 MIN: CPT | Performed by: INTERNAL MEDICINE

## 2021-03-22 NOTE — PROGRESS NOTES
[facility] Gastroenterology    Chief Complaint   Patient presents with   • Crohn's Disease       Subjective     HPI    Ricardo Hugo is a 71 y.o. male who presents with a chief complaint of Crohn's.      When he was here in November he was asymptomatic on Apriso.  We have talked about options.  He chose to stay with Apriso.  He also takes 1 packet of cholestyramine daily.    He tells me he is still doing well.  Denies diarrhea.  States he averages 1 bowel movement every other day.  Is formed.  Denies any bloody mucus.  No abdominal cramping.  Continues with Apriso and 1 packet of cholestyramine daily.  He did have labs done March 11.  I reviewed those with him.  His creatinine is 2.22 which is down a little bit.  Hemoglobin slightly higher at 9.7 than what it was in September at 8.5.  He does continue to follow with nephrology tells me.  He recently had an ultrasound Doppler of his neck and was found to have 70% stenosis in one of his carotids.  He is scheduled to see Dr. Pineda from vascular in a week.        ================================================ November 2020 HPI= 9=================  HPI     iRcardo Hugo is a 70 y.o. male who presents with a chief complaint of Crohn's     He did undergo colonoscopy in October.  See his summary of the pathology and copy of the impression from the colonoscopy below.  Overall it appeared improved.  There is no gross visual evidence of obvious Crohn's disease.  Random biopsies throughout the colon did show evidence of microscopic mild inflammation throughout with mild to moderate in the sigmoid colon.      Currently he feels well.  States he is having 1 formed brown bowel movement a day.  He takes Questran 1 packet daily.  He continues on Apriso 4 tablets daily.  Denies bloody mucus.  Denies cramps.  He has good appetite.  He is eating what he wants to.  He is active.  He has no complaints.  He does tell me he continues to follow-up with nephrology.  He has been  told his renal function has been stable.  He is drinking his Gatorade once daily.  He has a follow-up appointment with renal after the holidays.     Colonoscopy October 13, 2020 impression  - The examined portion of the ileum was normal.  - A single non-bleeding colonic angioectasia.  - hemosiderin staining, minimum and improved from July's exam  - The examination was otherwise normal on direct and retroflexion views.  - Diverticulosis in the sigmoid colon.  - Biopsies were taken with a cold forceps from the ascending colon, transverse colon, 40cm, 20cm and  rectum for evaluation of microscopic colitis.  - - No visual evidence of active Crohn's     Pathology showed mild active inflammation on the random biopsies with mild to moderate in the sigmoid colon.        ======================== September 29, 2020 HPI================================  HPI     Ricardo Hugo is a 70 y.o. male who presents with a chief complaint of Crohn's and diarrhea.     He was in the hospital mid-September.  He was there with UTI and acute renal failure.  They attributed his acute renal failure to diarrhea.  But similar to when he was in the hospital in August he told me he was not having that much diarrhea.  When I saw him there in September he states he was maybe having 2-3 bowel movements at most.  He states they were controlled with Questran but he was only taking it once a day.  Previously in August he was not taking the Apriso except for 1 tablet daily.  When we saw him in September he was taken the Apriso 4 tablets daily.  He was diagnosed with urinary tract infection and felt to be may be prostatitis.  He is seeing nephrology.     He comes in for follow-up visit.  He states he is having 1 bowel movement a day.  He describes this as soft mud.  There is no blood or mucus.  No nocturnal bowel movements.  No abdominal cramping.  He is eating well.  He finished his Cipro for his UTI type symptoms 2 days ago.  He saw his primary care  provider earlier today who felt everything was stable.  He is due to see nephrology tomorrow.  He is now drinking 2 bottles of Gatorade a day to help with hydration.     He continues on Apriso 4 tablets daily and is taking Questran 1 packet twice a day.       Past Medical History:   Diagnosis Date   • 3-vessel CAD 8/11/2020   • Allergic rhinitis    • Anxiety disorder 4/27/2020   • Asymmetrical sensorineural hearing loss 6/28/2017   • Atherosclerosis of native artery of both lower extremities with intermittent claudication (CMS/[facility]) 7/18/2019   • Avascular necrosis of femoral head, left (CMS/[facility]) 7/11/2020   • Carotid stenosis    • Chronic mucoid otitis media    • Chronic rhinitis    • Coronary artery disease     HEART BYPASS 2004   • Crohn's disease of large intestine with other complication (CMS/[facility]) 7/30/2020    Chronic diarrhea Colonoscopy July 2020 revealed mild patchy scattered hemosiderin staining with inflammation more so in rectosigmoid area.  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   • Iron deficiency anemia 7/14/2020   • Mixed hearing loss of left ear    • PAD (peripheral artery disease) (CMS/[facility]) 8/11/2020   • Perianal abscess    • Pernicious anemia 8/17/2020   • Personal history of alcoholism (CMS/[facility]) 8/11/2020   • Prostatic hypertrophy 8/11/2020   • Sensorineural hearing loss    • Tinnitus    • Ventricular tachycardia, nonsustained (CMS/[facility]) 7/14/2020   • Weight loss 7/11/2020       Past Surgical History:   Procedure Laterality Date   • COLONOSCOPY N/A 7/2/2020    Procedure: COLONOSCOPY WITH ANESTHESIA;  Surgeon: Adrien Brewster MD;   Location: [facility] ENDOSCOPY;  Service: Gastroenterology;  Laterality: N/A;  pre op: diarrhea  post op: polyps  PCP: Joe Velasco MD   • COLONOSCOPY N/A 10/13/2020    Procedure: COLONOSCOPY WITH ANESTHESIA;  Surgeon: Adrien Brewster MD;  Location: [facility] ENDOSCOPY;  Service: Gastroenterology;  Laterality: N/A;  Pre: Chronic Diarrhea, Crohn's  Post: AVM  Dr. Neftali Velasco  CO2 Inflation Used   • CORONARY ARTERY BYPASS GRAFT     • INCISION AND DRAINAGE PERIRECTAL ABSCESS N/A 3/3/2017    Procedure: INCISION AND DRAINAGE OF JEET ANAL ABSCESS;  Surgeon: Lynette Smith MD;  Location: [facility] OR;  Service:    • MYRINGOTOMY W/ TUBES Left 04/17/2017    06/10/2016   • TONSILLECTOMY     • TOTAL HIP ARTHROPLASTY           Current Outpatient Medications:   •  albuterol sulfate  (90 Base) MCG/ACT inhaler, USE 2 INHALATIONS FOUR TIMES A DAY AS NEEDED, Disp: 20.1 g, Rfl: 3  •  amLODIPine (NORVASC) 10 MG tablet, Take 10 mg by mouth Daily., Disp: , Rfl:   •  aspirin (ASPIR) 81 MG EC tablet, Take 81 mg by mouth Daily., Disp: , Rfl:   •  Aspirin-Acetaminophen-Caffeine (EXCEDRIN EXTRA STRENGTH PO), Take  by mouth., Disp: , Rfl:   •  azelastine (ASTELIN) 0.1 % nasal spray, USE 1 SPRAY IN EACH NOSTRIL TWICE A DAY AS NEEDED, Disp: 90 mL, Rfl: 3  •  cholestyramine (QUESTRAN) 4 g packet, Take 1 packet by mouth Daily., Disp: 90 packet, Rfl: 3  •  cloNIDine (CATAPRES) 0.2 MG tablet, TAKE 3 TABLETS DAILY, Disp: 270 tablet, Rfl: 3  •  desoximetasone (TOPICORT) 0.25 % cream, APPLY TO THE AFFECTED AREA TWICE A DAY AS NEEDED, Disp: 30 g, Rfl: 3  •  diphenhydrAMINE (Benadryl Allergy) 25 mg capsule, Take 25 mg by mouth Every 6 (Six) Hours As Needed for Itching., Disp: , Rfl:   •  fexofenadine (ALLEGRA) 180 MG tablet, Take 180 mg by mouth Daily., Disp: , Rfl:   •  fluticasone (FLONASE) 50 MCG/ACT nasal spray, 2 sprays into the nostril(s) as directed by provider Daily As Needed for Rhinitis., Disp: , Rfl:   •  levothyroxine (Synthroid) 50  MCG tablet, Take 1 tablet by mouth Daily., Disp: 30 tablet, Rfl: 11  •  magnesium chloride ER 64 MG DR tablet, Take 143 mg by mouth Daily., Disp: , Rfl:   •  Melatonin 10 MG capsule, Take 2 capsules by mouth Every Night., Disp: , Rfl:   •  mesalamine (APRISO) 0.375 g 24 hr capsule, Take 4 capsules by mouth Daily., Disp: 360 capsule, Rfl: 3  •  metoprolol succinate XL (TOPROL-XL) 25 MG 24 hr tablet, Take 1 tablet by mouth Daily., Disp: 90 tablet, Rfl: 3  •  Multiple Vitamins-Minerals (PRESERVISION/LUTEIN) capsule, Take 1 capsule by mouth 2 (two) times a day., Disp: , Rfl:   •  omeprazole (priLOSEC) 20 MG capsule, Take 1 capsule by mouth Daily., Disp: 90 capsule, Rfl: 3  •  potassium chloride 10 MEQ CR tablet, Take 1 tablet by mouth 2 (Two) Times a Day., Disp: 180 tablet, Rfl: 3  •  sodium bicarbonate 650 MG tablet, Take 1 tablet by mouth 2 (Two) Times a Day., Disp: 180 tablet, Rfl: 3  •  valsartan (DIOVAN) 160 MG tablet, Take 1 tablet by mouth Daily., Disp: , Rfl:   •  vitamin D (ERGOCALCIFEROL) 1.25 MG (96737 UT) capsule capsule, Take 50,000 Units by mouth 1 (One) Time Per Week., Disp: , Rfl:     Allergies   Allergen Reactions   • Lortab [Hydrocodone-Acetaminophen] Other (See Comments) and Hallucinations     CLOSTROPHOBIC   • Allopurinol Other (See Comments)     Pain on right side       Social History     Socioeconomic History   • Marital status:      Spouse name: Not on file   • Number of children: Not on file   • Years of education: Not on file   • Highest education level: Not on file   Tobacco Use   • Smoking status: Former Smoker     Quit date: 10/13/2013     Years since quittin.4   • Smokeless tobacco: Never Used   • Tobacco comment: quit    Vaping Use   • Vaping Use: Never used   Substance and Sexual Activity   • Alcohol use: Yes     Comment: rare   • Drug use: No   • Sexual activity: Defer       Family History   Problem Relation Age of Onset   • No Known Problems Mother    • No Known Problems  Father    • Colon cancer Neg Hx    • Colon polyps Neg Hx        Review of Systems  General no fever chills or sweats weight stable  Gastrointestinal: Not present-abdominal pain, constipation, diarrhea, dysphagia, hematemesis, melena, odynophagia, nausea, vomiting, pyrosis, regurgitation, hematochezia,    Objective     Vitals:    03/22/21 1343   BP: 138/58   Pulse: 57   Temp: 96.9 °F (36.1 °C)   SpO2: 98%       Physical Exam  No acute distress. Vital signs as documented.  Sclera anicteric.  Neck without noticeable JVD. Lungs clear to auscultation. Heart exam notable for regular rhythm, normal sounds. Abdomen is soft, nontender, non distended, normal bowel sounds and without evidence of organomegaly, masses.  Neuro alert, moves extremities.        Assessment/Plan   Problem List Items Addressed This Visit        Gastrointestinal Abdominal     Chronic diarrhea    Crohn's disease of large intestine with other complication (CMS/HCC) - Primary    Overview     Chronic diarrhea Colonoscopy July 2020 revealed mild patchy scattered hemosiderin staining with inflammation more so in rectosigmoid area.  [facility] lab IBD first step consistent with Crohn's.  Colonoscopy October 2020 showed no active disease but pathology did show microscopic inflammation.  Asymptomatic on Apriso    T spot and hepatitis panel -September 16, 2020                 Clinically his diarrhea has resolved.  He has responded well to Apriso and cholestyramine.  Colonoscopy back in October revealed no visual disease.  He did have microscopic inflammation on random biopsies but he remains asymptomatic.  We talked about step up therapy again.  He wishes to stay with his current regimen as it is working well for him.  I think that is reasonable.  Reinforced a healthy diet and healthy routine.  We will see him back in the office in 6 months    Continue ongoing management by primary care provider and other specialists.     Patient's Body mass index is 23.73  kg/m². BMI is within normal parameters. No follow-up required..        EMR Dragon/transcription disclaimer:  Much of this encounter note is electronic transcription/translation of spoken language to printed text.  The electronic translation of spoken language may be erroneous, or at times, nonsensical words or phrases may be inadvertently transcribed.  Although I have reviewed the note for such errors, some may still exist.    Adrien Brewster MD  14:52 CDT  03/22/21

## 2021-03-29 ENCOUNTER — TELEPHONE (OUTPATIENT)
Dept: VASCULAR SURGERY | Facility: CLINIC | Age: 71
End: 2021-03-29

## 2021-03-29 NOTE — TELEPHONE ENCOUNTER
Spoke with Mr Hugo reminding him of his appointment for Tuesday, March 30th, 2021 at 815 am with Dr Pineda. Mr uHgo confirmed he would be here.

## 2021-03-30 ENCOUNTER — OFFICE VISIT (OUTPATIENT)
Dept: VASCULAR SURGERY | Facility: CLINIC | Age: 71
End: 2021-03-30

## 2021-03-30 VITALS
SYSTOLIC BLOOD PRESSURE: 116 MMHG | HEART RATE: 63 BPM | WEIGHT: 170 LBS | DIASTOLIC BLOOD PRESSURE: 70 MMHG | BODY MASS INDEX: 23.03 KG/M2 | HEIGHT: 72 IN | OXYGEN SATURATION: 98 %

## 2021-03-30 DIAGNOSIS — I10 ESSENTIAL HYPERTENSION: ICD-10-CM

## 2021-03-30 DIAGNOSIS — I65.23 BILATERAL CAROTID ARTERY STENOSIS: Primary | ICD-10-CM

## 2021-03-30 PROCEDURE — 99204 OFFICE O/P NEW MOD 45 MIN: CPT | Performed by: SURGERY

## 2021-03-30 NOTE — PROGRESS NOTES
03/30/2021      Joe Velasco MD  [address] [place] [address]    Ricardo Hugo  1950    Chief Complaint   Patient presents with   • Establish Care     Referred over by Dr Velasco for Bilatral Carotid Artery Stenosis. Test [id number] US pad carotid bilateral. Patient denies any stroke like symptoms.    • Former Smoker     Patient is a FOrmer Smoker - Quit 2013   • Med Management     Verified medications from list patient brought in        Dear Joe Velasco MD:      HPI  I had the pleasure of seeing your patient Ricardo Hugo in the office today.  Thank you kindly for this consultation.  As you recall, Ricardo Hugo is a 71 y.o.  male who you are currently following for routine health maintenance.  He denies any strokelike symptoms.  He is maintained on aspirin.  He does have some claudication to his lower extremities, mostly in the thighs.  He had previous testing showing moderate arterial stenosis.  He did have noninvasive testing performed, which I did personally review.     Past Medical History:   Diagnosis Date   • 3-vessel CAD 8/11/2020   • Allergic rhinitis    • Anxiety disorder 4/27/2020   • Asymmetrical sensorineural hearing loss 6/28/2017   • Atherosclerosis of native artery of both lower extremities with intermittent claudication (CMS/HCC) 7/18/2019   • Avascular necrosis of femoral head, left (CMS/HCC) 7/11/2020   • Carotid stenosis    • Chronic mucoid otitis media    • Chronic rhinitis    • Coronary artery disease     HEART BYPASS 2004   • Crohn's disease of large intestine with other complication (CMS/HCC) 7/30/2020    Chronic diarrhea Colonoscopy July 2020 revealed mild patchy scattered hemosiderin staining with inflammation more so in rectosigmoid area.  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   • Iron deficiency anemia 7/14/2020   • Mixed hearing loss of left ear    • PAD (peripheral artery disease) (CMS/[facility]) 8/11/2020   • Perianal abscess    • Pernicious anemia 8/17/2020   • Personal history of alcoholism (CMS/[facility]) 8/11/2020   • Prostatic hypertrophy 8/11/2020   • Sensorineural hearing loss    • Tinnitus    • Ventricular tachycardia, nonsustained (CMS/[facility]) 7/14/2020   • Weight loss 7/11/2020       Past Surgical History:   Procedure Laterality Date   • COLONOSCOPY N/A 7/2/2020    Procedure: COLONOSCOPY WITH ANESTHESIA;  Surgeon: Adrien Brewster MD;  Location: [facility] ENDOSCOPY;  Service: Gastroenterology;  Laterality: N/A;  pre op: diarrhea  post op: polyps  PCP: Joe Velasco MD   • COLONOSCOPY N/A 10/13/2020    Procedure: COLONOSCOPY WITH ANESTHESIA;  Surgeon: Adrien Brewster MD;  Location: [facility] ENDOSCOPY;  Service: Gastroenterology;  Laterality: N/A;  Pre: Chronic Diarrhea, Crohn's  Post: AVM  Dr. Neftali Velasco  CO2 Inflation Used   • CORONARY ARTERY BYPASS GRAFT     • INCISION AND DRAINAGE PERIRECTAL ABSCESS N/A 3/3/2017    Procedure: INCISION AND DRAINAGE OF JEET ANAL ABSCESS;  Surgeon: Lynette Smith MD;  Location: [facility] OR;  Service:    • MYRINGOTOMY W/ TUBES Left 04/17/2017    06/10/2016   • TONSILLECTOMY     • TOTAL HIP ARTHROPLASTY         Family History   Problem Relation Age of Onset   • No Known Problems Mother    • No Known Problems Father    • Colon cancer Neg Hx    • Colon polyps Neg Hx        Social History     Socioeconomic History   • Marital status:      Spouse name: Not on file   • Number of children: Not on file   • Years of education: Not on file   • Highest education level: Not on file   Tobacco Use   • Smoking status: Former Smoker     Quit date: 10/13/2013     Years  since quittin.4   • Smokeless tobacco: Never Used   • Tobacco comment: quit 2010   Vaping Use   • Vaping Use: Never used   Substance and Sexual Activity   • Alcohol use: Yes     Comment: rare   • Drug use: No   • Sexual activity: Defer       Allergies   Allergen Reactions   • Lortab [Hydrocodone-Acetaminophen] Other (See Comments) and Hallucinations     CLOSTROPHOBIC   • Allopurinol Other (See Comments)     Pain on right side       Current Outpatient Medications   Medication Instructions   • albuterol sulfate  (90 Base) MCG/ACT inhaler USE 2 INHALATIONS FOUR TIMES A DAY AS NEEDED   • amLODIPine (NORVASC) 10 mg, Oral, Daily   • aspirin (ASPIR) 81 mg, Oral, Daily   • Aspirin-Acetaminophen-Caffeine (EXCEDRIN EXTRA STRENGTH PO) Oral   • azelastine (ASTELIN) 0.1 % nasal spray USE 1 SPRAY IN EACH NOSTRIL TWICE A DAY AS NEEDED   • cholestyramine (QUESTRAN) 4 g packet 1 packet, Oral, Daily   • cloNIDine (CATAPRES) 0.2 MG tablet TAKE 3 TABLETS DAILY   • desoximetasone (TOPICORT) 0.25 % cream APPLY TO THE AFFECTED AREA TWICE A DAY AS NEEDED   • diphenhydrAMINE (BENADRYL ALLERGY) 25 mg, Oral, Every 6 Hours PRN   • fexofenadine (ALLEGRA) 180 mg, Oral, Daily   • fluticasone (FLONASE) 50 MCG/ACT nasal spray 2 sprays, Nasal, Daily PRN   • levothyroxine (SYNTHROID) 50 mcg, Oral, Daily   • magnesium chloride  mg, Oral, Daily   • Melatonin 10 MG capsule 2 capsules, Oral, Nightly   • mesalamine (APRISO) 1.5 g, Oral, Daily   • metoprolol succinate XL (TOPROL-XL) 25 mg, Oral, Daily   • Multiple Vitamins-Minerals (PRESERVISION/LUTEIN) capsule 1 capsule, Oral, 2 times daily   • omeprazole (PRILOSEC) 20 mg, Oral, Daily   • potassium chloride 10 MEQ CR tablet 10 mEq, Oral, 2 Times Daily   • sodium bicarbonate 650 mg, Oral, 2 Times Daily   • vitamin D (ERGOCALCIFEROL) 50,000 Units, Oral, Weekly      Review of Systems   Constitutional: Negative.    HENT: Negative.    Eyes: Negative.    Respiratory: Negative.   "  Cardiovascular: Negative.    Gastrointestinal: Negative.    Endocrine: Negative.    Genitourinary: Negative.    Musculoskeletal: Negative.    Skin: Negative.    Allergic/Immunologic: Negative.    Neurological: Negative.    Hematological: Negative.    Psychiatric/Behavioral: Negative.    All other systems reviewed and are negative.      /70 (BP Location: Right arm, Patient Position: Sitting, Cuff Size: Adult)   Pulse 63   Ht 182.9 cm (72\")   Wt 77.1 kg (170 lb)   SpO2 98%   BMI 23.06 kg/m²   Physical Exam  Vitals and nursing note reviewed.   Constitutional:       Appearance: Normal appearance. He is well-developed and normal weight.   HENT:      Head: Normocephalic and atraumatic.   Eyes:      General: No scleral icterus.     Pupils: Pupils are equal, round, and reactive to light.   Neck:      Thyroid: No thyromegaly.      Vascular: Carotid bruit (right) present. No JVD.   Cardiovascular:      Rate and Rhythm: Normal rate and regular rhythm.      Pulses:           Carotid pulses are 2+ on the right side and 2+ on the left side.       Femoral pulses are 2+ on the right side and 2+ on the left side.       Popliteal pulses are 2+ on the right side and 2+ on the left side.        Dorsalis pedis pulses are 2+ on the right side and 2+ on the left side.        Posterior tibial pulses are 2+ on the right side and 2+ on the left side.      Heart sounds: Normal heart sounds.   Pulmonary:      Effort: Pulmonary effort is normal.      Breath sounds: Normal breath sounds.   Abdominal:      General: Bowel sounds are normal. There is no distension or abdominal bruit.      Palpations: Abdomen is soft. There is no mass.      Tenderness: There is no abdominal tenderness.   Musculoskeletal:         General: Normal range of motion.      Cervical back: Neck supple.   Lymphadenopathy:      Cervical: No cervical adenopathy.   Skin:     General: Skin is warm and dry.   Neurological:      General: No focal deficit present.      " Mental Status: He is alert and oriented to person, place, and time.      Cranial Nerves: No cranial nerve deficit.      Sensory: No sensory deficit.   Psychiatric:         Mood and Affect: Mood normal.         Behavior: Behavior normal.         Thought Content: Thought content normal.         Judgment: Judgment normal.         US Carotid Bilateral    Result Date: 3/17/2021  Narrative: EXAM: US CAROTID BILATERAL- - 3/17/2021 2:24 PM CDT  HISTORY: bruit; R09.89-Other specified symptoms and signs involving the circulatory and respiratory systems   COMPARISON: No existing relevant imaging studies available .  TECHNIQUE: Grayscale, spectral and color Doppler ultrasound images for evaluation of carotid vessels in the neck have been obtained.  FINDINGS: Right side: Eccentric plaque present. Peak systolic velocity of right common carotid artery: 72.01 cm/sec. Peak systolic velocity of right internal carotid artery: 336.59 cm/sec. ICA to CCA ratio:  4.7 Blood flow in the right vertebral artery and right external carotid artery is antegrade.  Left side: Eccentric plaque present. Peak systolic velocity of left common carotid artery:  99.9 cm/sec. Peak systolic velocity of left internal carotid artery: 172.30 cm/sec. ICA to CCA ratio:  1.7 Blood flow in the left vertebral artery and left external carotid artery is antegrade.  Measurement of carotid stenosis is based on velocity parameters that correlate the residual Internal carotid diameter with North American Symptomatic Carotid Endarterectomy Trial (NASCET)-based stenosis levels.       Impression: 1. Grading of carotid stenosis performed using Society of Radiologists in Ultrasound Consensus Conference criteria. 2. There is greater than 70% stenosis of the right internal carotid artery. 3. There is 50-70 percent stenosis of the left internal carotid artery. 4. Normal antegrade flow in the bilateral vertebral arteries. This report was finalized on 03/17/2021 15:04 by   Serenity Orozco MD.       Impression      The patient has moderately diminished ankle-brachial indices bilateral    lower extremity(ies) which would be consistent with claudication level    symptoms.    Based on segmental pressures and doppler waveforms, the patient likely has    disease in the bilateral superficial femoral and tibial arterial segments.      Signature      ----------------------------------------------------------------    Electronically signed by Wellington Palma MD(Interpreting    physician) on 01/28/2020 03:18 PM    ----------------------------------------------------------------     Velocities are measured in cm/s ; Diameters are measured in mm     Pressures   +--------------------------------------++--------+-----+----+--------+-----+   !                                      !!Right   !     !Left!        !     !   +--------------------------------------++--------+-----+----+--------+-----+   !Location                              !!Pressure!Ratio!    !Pressure!Ratio!   +--------------------------------------++--------+-----+----+--------+-----+   !Upper Thigh                           !!144     !1    !    !157     !1.09 !   +--------------------------------------++--------+-----+----+--------+-----+   !Lower Thigh                           !!163     !1.13 !    !111     !0.77 !   +--------------------------------------++--------+-----+----+--------+-----+   !Calf                                  !!119     !0.83 !    !83      !0.58 !   +--------------------------------------++--------+-----+----+--------+-----+   !Ankle PT                              !!105     !0.73 !    !84      !0.58 !   +--------------------------------------++--------+-----+----+--------+-----+   !DP                                    !!101     !0.7  !    !86      !0.6  !   +--------------------------------------++--------+-----+----+--------+-----+   !Great Toe                             !!76      !0.53 !    !46      !0.32 !    +--------------------------------------++--------+-----+----+--------+-----+       - Brachial Pressure:Right: 144.Left:142.       - SAGRARIO:Right: 0.73.Left: 0.6.      Patient Active Problem List   Diagnosis   • Chronic rhinitis   • Acute renal failure superimposed on stage 3 chronic kidney disease (CMS/HCC)   • Hyponatremia   • Acute cystitis   • Metabolic acidosis, increased anion gap   • Hypomagnesemia   • Hypokalemia   • Moderate malnutrition (CMS/[facility])   • Essential hypertension   • Sepsis with acute renal failure (CMS/[facility])   • Enterocolitis   • Chronic diarrhea   • UTI (urinary tract infection), bacterial   • Crohn's disease of large intestine with other complication (CMS/[facility])   • Asymmetrical hearing loss of left ear   • Anemia, chronic disease   • CKD (chronic kidney disease) stage 4, GFR 15-29 ml/min (CMS/[facility])   • Bruit of right carotid artery   • Wellness examination         ICD-10-CM ICD-9-CM   1. Bilateral carotid artery stenosis  I65.23 433.10     433.30   2. Essential hypertension  I10 401.9           Plan: After thoroughly evaluating Ricardo PITER Hugo, I believe the best course of action is to proceed with further imaging including a CTA of the neck.  I did review his carotid duplex which shows 70-99% right carotid stenosis and 50-69% left carotid stenosis.  We will see him back in the next 2 weeks to review his testing.  He also has complaints of claudication to his thighs.  Once we have addressed his carotid disease, we will address his PAD.  I did discuss vascular risk factors as they pertain to the progression of vascular disease including controlling his hypertension, which is currently stable.  The patient can continue taking their current medication regimen as previously planned.  This was all discussed in full with complete understanding.    Thank you for allowing me to participate in the care of your patient.  Please do not hesitate with any questions or concerns.  I will keep you aware of any  further encounters with Ricardo Hugo.        Sincerely yours,         Gil Pineda, DO         Scribed for Dr. Gil Pineda by Elena KATZ

## 2021-04-07 ENCOUNTER — TELEPHONE (OUTPATIENT)
Dept: VASCULAR SURGERY | Facility: CLINIC | Age: 71
End: 2021-04-07

## 2021-04-07 NOTE — TELEPHONE ENCOUNTER
Pt called regarding pain in legs. Pt states that he is getting a CT of his neck done and was wondering if it was a good idea if he should also get one done on his legs, since he's having pain in them. I asked how bad his pain is and the pt states that he can walk and it is manageable currently. I informed him the  would be out of the office on vacation until Monday 04/12/2021 and the pt has an appt on Tuesday 04/13/2021. We agreed to bring it up on  Tuesday to the doctor so that he can decide the best course of action.

## 2021-04-08 RX ORDER — CHOLESTYRAMINE 4 G/9G
1 POWDER, FOR SUSPENSION ORAL DAILY
Qty: 90 PACKET | Refills: 3 | Status: SHIPPED | OUTPATIENT
Start: 2021-04-08 | End: 2021-09-07

## 2021-04-12 ENCOUNTER — HOSPITAL ENCOUNTER (OUTPATIENT)
Dept: CT IMAGING | Facility: HOSPITAL | Age: 71
Discharge: HOME | End: 2021-04-12

## 2021-04-12 DIAGNOSIS — I65.23 BILATERAL CAROTID ARTERY STENOSIS: ICD-10-CM

## 2021-04-12 DIAGNOSIS — I65.23 BILATERAL CAROTID ARTERY STENOSIS: Primary | ICD-10-CM

## 2021-04-12 LAB — CREAT BLDA-MCNC: 3.1 MG/DL (ref 0.6–1.3)

## 2021-04-12 PROCEDURE — 82565 ASSAY OF CREATININE: CPT

## 2021-04-13 ENCOUNTER — TELEPHONE (OUTPATIENT)
Dept: VASCULAR SURGERY | Facility: CLINIC | Age: 71
End: 2021-04-13

## 2021-04-13 NOTE — TELEPHONE ENCOUNTER
Spoke with Mr Hugo letting him know we will need to do a MRA of the Neck instead due to his kidney function. I let Mr Hugo know I have that scheduled for him on Wednesday, April 21st, 2021 to arrive at [facility] at 1015 am for 1045 am testing and then follow up with Dr Pineda on Thursday, April 22nd, 2021 at 830 am. Mr Hugo verbalized understanding and confirmed he would be here.         ----- Message -----  From: Analisa Salgado  Sent: 4/12/2021  10:15 AM CDT  To: STERLING Murray    CT called and stated that Mr Hugo was there for CTA of the Neck but his Creatine was 3.1 with a GFR below 30. Patient went home I advised we would get something else scheduled for him and give him a call with new day and time of testing and office appointment

## 2021-04-21 ENCOUNTER — TELEPHONE (OUTPATIENT)
Dept: VASCULAR SURGERY | Facility: CLINIC | Age: 71
End: 2021-04-21

## 2021-04-21 ENCOUNTER — HOSPITAL ENCOUNTER (OUTPATIENT)
Dept: MRI IMAGING | Facility: HOSPITAL | Age: 71
Discharge: HOME | End: 2021-04-21
Admitting: NURSE PRACTITIONER

## 2021-04-21 DIAGNOSIS — I65.23 BILATERAL CAROTID ARTERY STENOSIS: ICD-10-CM

## 2021-04-21 PROCEDURE — 70547 MR ANGIOGRAPHY NECK W/O DYE: CPT

## 2021-04-21 NOTE — TELEPHONE ENCOUNTER
Spoke with Mr Hugo reminding him of his appointment for Thursday, April 22nd, 2021 at 815 am with Dr Pineda. Mr Hugo confirmed he would be here.       Test Comp [id number]

## 2021-04-22 ENCOUNTER — OFFICE VISIT (OUTPATIENT)
Dept: VASCULAR SURGERY | Facility: CLINIC | Age: 71
End: 2021-04-22

## 2021-04-22 VITALS
OXYGEN SATURATION: 99 % | WEIGHT: 170 LBS | HEIGHT: 72 IN | BODY MASS INDEX: 23.03 KG/M2 | HEART RATE: 54 BPM | DIASTOLIC BLOOD PRESSURE: 74 MMHG | SYSTOLIC BLOOD PRESSURE: 134 MMHG

## 2021-04-22 DIAGNOSIS — I73.9 PAD (PERIPHERAL ARTERY DISEASE) (HCC): ICD-10-CM

## 2021-04-22 DIAGNOSIS — I10 ESSENTIAL HYPERTENSION: ICD-10-CM

## 2021-04-22 DIAGNOSIS — I65.23 BILATERAL CAROTID ARTERY STENOSIS: Primary | ICD-10-CM

## 2021-04-22 PROCEDURE — 99214 OFFICE O/P EST MOD 30 MIN: CPT | Performed by: SURGERY

## 2021-04-22 NOTE — PATIENT INSTRUCTIONS
Carotid Endarterectomy  A carotid endarterectomy is a surgery to remove a blockage in the carotid arteries. The carotid arteries are the large blood vessels on both sides of the neck that supply blood to the brain. Carotid artery disease, also called carotid artery stenosis, is the narrowing or blockage of one or both carotid arteries. Carotid artery disease is usually caused by atherosclerosis, which is a buildup of fat and plaque in the arteries. Some buildup of plaque normally occurs with aging. The plaque may partially or totally block blood flow or cause a clot to form in the carotid arteries. This may cause a stroke.  Tell a health care provider about:  · Any allergies you have.  · All medicines you are taking, including vitamins, herbs, eye drops, creams, and over-the-counter medicines.  · Any problems you or family members have had with anesthetic medicines.  · Any blood disorders you have.  · Any surgeries you have had.  · Any medical conditions you have, or have had, including diabetes, kidney problems, and infections.  · Whether you are pregnant or may be pregnant.  What are the risks?  Generally, this is a safe procedure. However, problems may occur, including:  · Infection.  · Bleeding.  · Blood clots.  · Allergic reactions to medicines.  · Damage to nerves near the carotid arteries. This can cause a hoarse voice or weakness of muscles in your face.  · Stroke.  · Seizures.  · Heart attack (myocardial infarction).  · Narrowing of the opened blood vessel (restenosis). This may require another surgery.  What happens before the procedure?  Staying hydrated  Follow instructions from your health care provider about hydration, which may include:  · Up to 2 hours before the procedure - you may continue to drink clear liquids, such as water, clear fruit juice, black coffee, and plain tea.    Eating and drinking restrictions  Follow instructions from your health care provider about eating and drinking, which may  include:  · 8 hours before the procedure - stop eating heavy meals or foods, such as meat, fried foods, or fatty foods.  · 6 hours before the procedure - stop eating light meals or foods, such as toast or cereal.  · 6 hours before the procedure - stop drinking milk or drinks that contain milk.  · 2 hours before the procedure - stop drinking clear liquids.  Medicines  · Ask your health care provider about:  ? Changing or stopping your regular medicines. This is especially important if you are taking diabetes medicines or blood thinners.  ? Taking medicines such as aspirin and ibuprofen. These medicines can thin your blood. Do not take these medicines unless your health care provider tells you to take them.  ? Taking over-the-counter medicines, vitamins, herbs, and supplements.  General instructions  · Do not use any products that contain nicotine or tobacco for at least 4-6 weeks, or as soon as possible, before the procedure. These products include cigarettes, e-cigarettes, and chewing tobacco. If you need help quitting, ask your health care provider.  · You may need to have blood tests, a test to check heart rhythm (electrocardiogram), or a test to check blood flow (angiogram).  · Plan to have someone take you home from the hospital or clinic.  · Ask your health care provider:  ? How your surgery site will be marked.  ? What steps will be taken to help prevent infection. These may include:  § Removing hair at the surgery site.  § Washing skin with a germ-killing soap.  § Taking antibiotic medicine.  What happens during the procedure?    · An IV will be inserted into one of your veins.  · You will be given one or more of the following:  ? A medicine to help you relax (sedative).  ? A medicine to make you fall asleep (general anesthetic).  · The surgeon will make a small incision in your neck to expose the carotid artery.  · A tube may be inserted into the carotid artery above and below the blockage. This tube will  allow blood to flow around the blockage during the surgery.  · An incision will be made in the carotid artery at the location of the blockage.  · The blockage will be removed. In some cases, a section of the carotid artery may be removed and a graft patch may be used to repair the artery.  · The carotid artery will be closed with stitches (sutures).  · If a tube was inserted into the artery to allow blood flow around the blockage during surgery, the tube will be removed. Once the tube is removed, blood flow through the carotid artery will be restored.  · The incision in the neck will be closed with sutures.  · A bandage (dressing) will be placed over your incision.  The procedure may vary among health care providers and hospitals.  What happens after the procedure?  · Your blood pressure, heart rate, breathing rate, and blood oxygen level will be monitored until you leave the hospital or clinic.  · You may have some pain or an ache in your neck for about 2 weeks. This is normal.  · Do not drive for 24 hours if you were given a sedative during your procedure.  Summary  · A carotid endarterectomy is a surgery to remove a blockage in the carotid arteries.  · The carotid arteries are the large blood vessels on both sides of the neck that supply blood to the brain.  · Before the procedure, ask your health care provider about changing or stopping your regular medicines.  · Follow instructions from your health care provider about eating and drinking before the procedure.  · After the procedure, do not drive for 24 hours if you were given a sedative.  This information is not intended to replace advice given to you by your health care provider. Make sure you discuss any questions you have with your health care provider.  Document Revised: 08/27/2019 Document Reviewed: 08/27/2019  Elsevier Patient Education © 2021 [employer]

## 2021-04-22 NOTE — PROGRESS NOTES
"04/22/2021       Joe Velasco MD  [address] [place] [address]    Ricardo Hugo  1950    Chief Complaint   Patient presents with   • Follow-up     2 Week Follow Up For Bilateral Carotid Artery Stenosis. Test [id number] MR pad neck wo contrast. Patient denies any stroke like symptoms.    • Former Smoker     Patient is a Former Smoker - Quit 2013   • Med Management     Verified medicaitons from list patient brought in        Dear Joe Velasco MD       HPI  I had the pleasure of seeing your patient Ricardo Hugo in the office today.  As you recall, Ricardo Hugo is a 71 y.o.  male who you are currently following for routine health maintenance.  He denies any strokelike symptoms.  He is maintained on aspirin.  He does have some claudication to his lower extremities, mostly in the thighs.  He had previous testing showing moderate arterial stenosis. He was found to have significant carotid stenosis as well.  He did have noninvasive testing performed today, which I did review in office.       Review of Systems   Constitutional: Negative.    HENT: Negative.    Eyes: Negative.    Respiratory: Negative.    Cardiovascular: Negative.    Gastrointestinal: Negative.    Endocrine: Negative.    Genitourinary: Negative.    Musculoskeletal: Negative.    Skin: Negative.    Allergic/Immunologic: Negative.    Neurological: Negative.    Hematological: Negative.    Psychiatric/Behavioral: Negative.    All other systems reviewed and are negative.      /74 (BP Location: Right arm, Patient Position: Sitting, Cuff Size: Adult)   Pulse 54   Ht 182.9 cm (72\")   Wt 77.1 kg (170 lb)   SpO2 99%   BMI 23.06 kg/m²   Physical Exam  Vitals and nursing note reviewed.   Constitutional:       Appearance: Normal appearance. He is well-developed and normal weight.   HENT:      Head: Normocephalic and atraumatic.   Eyes:      General: No scleral icterus.     Pupils: Pupils are equal, round, and reactive to light. "   Neck:      Thyroid: No thyromegaly.      Vascular: Carotid bruit (right) present. No JVD.   Cardiovascular:      Rate and Rhythm: Normal rate and regular rhythm.      Pulses:           Carotid pulses are 2+ on the right side and 2+ on the left side.       Femoral pulses are 2+ on the right side and 2+ on the left side.       Popliteal pulses are 2+ on the right side and 2+ on the left side.      Heart sounds: Normal heart sounds.   Pulmonary:      Effort: Pulmonary effort is normal.      Breath sounds: Normal breath sounds.   Abdominal:      General: Bowel sounds are normal. There is no distension or abdominal bruit.      Palpations: Abdomen is soft. There is no mass.      Tenderness: There is no abdominal tenderness.   Musculoskeletal:         General: Normal range of motion.      Cervical back: Normal range of motion and neck supple.   Lymphadenopathy:      Cervical: No cervical adenopathy.   Skin:     General: Skin is warm and dry.   Neurological:      General: No focal deficit present.      Mental Status: He is alert and oriented to person, place, and time.      Cranial Nerves: No cranial nerve deficit.      Sensory: No sensory deficit.   Psychiatric:         Mood and Affect: Mood normal.         Behavior: Behavior normal.         Thought Content: Thought content normal.         Judgment: Judgment normal.         MRI Angiogram Neck Without Contrast    Result Date: 4/21/2021  Narrative: MRI ANGIOGRAM NECK WO CONTRAST- 4/21/2021 10:51 AM CDT  CLINICAL: Carotid artery stenosis; I65.23-Occlusion and stenosis of bilateral carotid arteries  COMPARISON: Ultrasound dated 3/17/2021  Technique: Non-contrast 3D TOF SPGR images were obtained with 3-D and MIP post processing and reconstruction.  Findings:  Visualized portion of the aortic arch as well as the main arch branches are unremarkable. There is a normal course and caliber of both common carotid arteries. There is a notable calcified plaque identified at the origin  of the right internal carotid artery. There is an additional prominent narrowing along the proximal portion of the right internal carotid artery just downstream from its origin, with minimum luminal diameter measuring approximately 1.6 mm. This is compared with a more normal downstream caliber of 4.8 mm. Minimal plaque identified in the left carotid bulb as well as along the proximal portion of the left internal carotid artery. The minimum luminal diameter along the proximal left internal carotid artery measures 4 mm, as compared with the downstream caliber of 5.6 mm.  Left vertebral arteries are dominant. Vertebral arteries are normal in caliber. No dissection or pseudoaneurysm identified.      Impression: Impression: 1. Atheromatous plaque identified in the right carotid bulb as well as along the proximal portion of the right internal carotid artery, with approximately 67% atheromatous narrowing along the proximal portion of the right internal carotid artery according to the NASCET calculation. 2. Approximately 28% narrowing along the proximal portion of the left internal carotid artery. 3. Bilateral vertebral arteries are patent, with the dominant left vertebral artery.   This report was finalized on 04/21/2021 13:19 by Dr Tyrell Knox, .       Impression      The patient has moderately diminished ankle-brachial indices bilateral    lower extremity(ies) which would be consistent with claudication level    symptoms.    Based on segmental pressures and doppler waveforms, the patient likely has    disease in the bilateral superficial femoral and tibial arterial segments.      Signature      ----------------------------------------------------------------    Electronically signed by Wellington Palma MD(Interpreting    physician) on 01/28/2020 03:18 PM    ----------------------------------------------------------------     Velocities are measured in cm/s ; Diameters are measured in mm     Pressures    +--------------------------------------++--------+-----+----+--------+-----+   !                                      !!Right   !     !Left!        !     !   +--------------------------------------++--------+-----+----+--------+-----+   !Location                              !!Pressure!Ratio!    !Pressure!Ratio!   +--------------------------------------++--------+-----+----+--------+-----+   !Upper Thigh                           !!144     !1    !    !157     !1.09 !   +--------------------------------------++--------+-----+----+--------+-----+   !Lower Thigh                           !!163     !1.13 !    !111     !0.77 !   +--------------------------------------++--------+-----+----+--------+-----+   !Calf                                  !!119     !0.83 !    !83      !0.58 !   +--------------------------------------++--------+-----+----+--------+-----+   !Ankle PT                              !!105     !0.73 !    !84      !0.58 !   +--------------------------------------++--------+-----+----+--------+-----+   !DP                                    !!101     !0.7  !    !86      !0.6  !   +--------------------------------------++--------+-----+----+--------+-----+   !Great Toe                             !!76      !0.53 !    !46      !0.32 !   +--------------------------------------++--------+-----+----+--------+-----+       - Brachial Pressure:Right: 144.Left:142.       - SAGRARIO:Right: 0.73.Left: 0.6.      Patient Active Problem List   Diagnosis   • Chronic rhinitis   • Acute renal failure superimposed on stage 3 chronic kidney disease (CMS/HCC)   • Hyponatremia   • Acute cystitis   • Metabolic acidosis, increased anion gap   • Hypomagnesemia   • Hypokalemia   • Moderate malnutrition (CMS/HCC)   • Essential hypertension   • Sepsis with acute renal failure (CMS/HCC)   • Enterocolitis   • Chronic diarrhea   • UTI (urinary tract infection), bacterial   • Crohn's disease of large intestine with other complication  (CMS/[facility])   • Asymmetrical hearing loss of left ear   • Anemia, chronic disease   • CKD (chronic kidney disease) stage 4, GFR 15-29 ml/min (CMS/[facility])   • Bruit of right carotid artery   • Wellness examination         ICD-10-CM ICD-9-CM   1. Bilateral carotid artery stenosis  I65.23 433.10     433.30   2. Essential hypertension  I10 401.9   3. PAD (peripheral artery disease) (CMS/[facility])  I73.9 443.9       Plan: After thoroughly evaluating Ricardo Hugo, I believe the best course of action is to proceed with a right carotid endarterectomy for stroke risk reduction.  I did review his testing with Dr. Pineda showing right carotid stenosis of 85%. Risks of carotid endarterectomy include, but are not limited to, bleeding, infection, vessel damage, nerve damage, MI, stroke, and death.  The patient understands these risks and would like to proceed with the procedure. We will send him for cardiac clearance.  Once, we receive clearance, I will schedule him appropriately.  After we have his carotid taken care of, we will address his PAD.  When we schedule angiogram, he will need hydration prior to the procedure. I did ask him to see him nephrologist sooner as his creatinine was 3.10, which baseline seems to be around 2.2.    I did discuss vascular risk factors as they pertain to the progression of vascular disease including controlling his hypertension, which is currently stable.  The patient can continue taking their current medication regimen as previously planned.  This was all discussed in full with complete understanding.    Thank you for allowing me to participate in the care of your patient.  Please do not hesitate with any questions or concerns.  I will keep you aware of any further encounters with Ricardo Hugo.        Sincerely yours,         STERLING Murray

## 2021-04-27 ENCOUNTER — OFFICE VISIT (OUTPATIENT)
Dept: CARDIOLOGY | Facility: CLINIC | Age: 71
End: 2021-04-27

## 2021-04-27 VITALS
HEIGHT: 72 IN | DIASTOLIC BLOOD PRESSURE: 61 MMHG | HEART RATE: 61 BPM | WEIGHT: 172 LBS | SYSTOLIC BLOOD PRESSURE: 180 MMHG | BODY MASS INDEX: 23.3 KG/M2

## 2021-04-27 DIAGNOSIS — I25.9 IHD (ISCHEMIC HEART DISEASE): ICD-10-CM

## 2021-04-27 DIAGNOSIS — I65.21 STENOSIS OF RIGHT CAROTID ARTERY: ICD-10-CM

## 2021-04-27 DIAGNOSIS — I73.9 PAD (PERIPHERAL ARTERY DISEASE) (HCC): ICD-10-CM

## 2021-04-27 DIAGNOSIS — Z01.818 PRE-OP EVALUATION: Primary | ICD-10-CM

## 2021-04-27 DIAGNOSIS — I10 HYPERTENSION, BENIGN: ICD-10-CM

## 2021-04-27 PROCEDURE — 99214 OFFICE O/P EST MOD 30 MIN: CPT | Performed by: INTERNAL MEDICINE

## 2021-04-27 PROCEDURE — 93000 ELECTROCARDIOGRAM COMPLETE: CPT | Performed by: INTERNAL MEDICINE

## 2021-04-27 RX ORDER — ATORVASTATIN CALCIUM 10 MG/1
10 TABLET, FILM COATED ORAL DAILY
Qty: 90 TABLET | Refills: 3 | Status: SHIPPED | OUTPATIENT
Start: 2021-04-27 | End: 2022-02-07

## 2021-04-27 NOTE — PROGRESS NOTES
"Subjective    Ricardo Hugo is a 71 y.o. male. Referred by [facility] service for pre-op eval    History of Present Illness     PRE-OP R CEA EVAL:  Has surgery indicated by imaging. Has multi-site atherosclerosis    IHD:  Remote CABG. Walking is limited to 300 yards at a moderate pace and limiter is bilateral leg claudication. Has no cp or unusual soa.     PAD:  Is seeing vas surg and notes indicate more imaging to come after the CEA is accomplished.    CKD:  This is fu with his pcp and is reported stable tho Cr bumps after IV contrast.    HTN:  meds are stable and regular home checks are \"130'3/70's\". \"my BP is up now because I had a long hike to get to the office\".        The following portions of the patient's history were reviewed and updated as appropriate: allergies, current medications, past family history, past medical history, past social history, past surgical history and problem list.    Patient Active Problem List   Diagnosis   • Chronic rhinitis   • Acute renal failure superimposed on stage 3 chronic kidney disease (CMS/HCC)   • Hyponatremia   • Acute cystitis   • Metabolic acidosis, increased anion gap   • Hypomagnesemia   • Hypokalemia   • Moderate malnutrition (CMS/HCC)   • Hypertension, benign   • Sepsis with acute renal failure (CMS/HCC)   • Enterocolitis   • Chronic diarrhea   • UTI (urinary tract infection), bacterial   • Crohn's disease of large intestine with other complication (CMS/HCC)   • Asymmetrical hearing loss of left ear   • Anemia, chronic disease   • CKD (chronic kidney disease) stage 4, GFR 15-29 ml/min (CMS/HCC)   • Bruit of right carotid artery   • Wellness examination   • Pre-op evaluation   • PAD (peripheral artery disease) (CMS/HCC)   • IHD (ischemic heart disease)   • Carotid stenosis       Allergies   Allergen Reactions   • Lortab [Hydrocodone-Acetaminophen] Other (See Comments) and Hallucinations     CLOSTROPHOBIC   • Allopurinol Other (See Comments)     Pain on right side "       Family History   Problem Relation Age of Onset   • No Known Problems Mother    • No Known Problems Father    • Colon cancer Neg Hx    • Colon polyps Neg Hx        Social History     Socioeconomic History   • Marital status:      Spouse name: Not on file   • Number of children: Not on file   • Years of education: Not on file   • Highest education level: Not on file   Tobacco Use   • Smoking status: Former Smoker     Quit date: 10/13/2013     Years since quittin.5   • Smokeless tobacco: Never Used   • Tobacco comment: quit 2013   Vaping Use   • Vaping Use: Never used   Substance and Sexual Activity   • Alcohol use: Yes     Comment: rare   • Drug use: No   • Sexual activity: Defer         Current Outpatient Medications:   •  albuterol sulfate  (90 Base) MCG/ACT inhaler, USE 2 INHALATIONS FOUR TIMES A DAY AS NEEDED, Disp: 20.1 g, Rfl: 3  •  amLODIPine (NORVASC) 10 MG tablet, Take 10 mg by mouth Daily., Disp: , Rfl:   •  aspirin (ASPIR) 81 MG EC tablet, Take 81 mg by mouth Daily., Disp: , Rfl:   •  Aspirin-Acetaminophen-Caffeine (EXCEDRIN EXTRA STRENGTH PO), Take  by mouth., Disp: , Rfl:   •  azelastine (ASTELIN) 0.1 % nasal spray, USE 1 SPRAY IN EACH NOSTRIL TWICE A DAY AS NEEDED, Disp: 90 mL, Rfl: 3  •  cholestyramine (QUESTRAN) 4 g packet, Take 1 packet by mouth Daily., Disp: 90 packet, Rfl: 3  •  cloNIDine (CATAPRES) 0.2 MG tablet, TAKE 3 TABLETS DAILY, Disp: 270 tablet, Rfl: 3  •  desoximetasone (TOPICORT) 0.25 % cream, APPLY TO THE AFFECTED AREA TWICE A DAY AS NEEDED, Disp: 30 g, Rfl: 3  •  diphenhydrAMINE (Benadryl Allergy) 25 mg capsule, Take 25 mg by mouth Every 6 (Six) Hours As Needed for Itching., Disp: , Rfl:   •  fexofenadine (ALLEGRA) 180 MG tablet, Take 180 mg by mouth Daily., Disp: , Rfl:   •  fluticasone (FLONASE) 50 MCG/ACT nasal spray, 2 sprays into the nostril(s) as directed by provider Daily As Needed for Rhinitis., Disp: , Rfl:   •  levothyroxine (Synthroid) 50 MCG tablet,  Take 1 tablet by mouth Daily., Disp: 30 tablet, Rfl: 11  •  magnesium chloride ER 64 MG DR tablet, Take 143 mg by mouth Daily., Disp: , Rfl:   •  Melatonin 10 MG capsule, Take 2 capsules by mouth Every Night., Disp: , Rfl:   •  mesalamine (APRISO) 0.375 g 24 hr capsule, Take 4 capsules by mouth Daily., Disp: 360 capsule, Rfl: 3  •  metoprolol succinate XL (TOPROL-XL) 25 MG 24 hr tablet, Take 1 tablet by mouth Daily., Disp: 90 tablet, Rfl: 3  •  Multiple Vitamins-Minerals (PRESERVISION/LUTEIN) capsule, Take 1 capsule by mouth 2 (two) times a day., Disp: , Rfl:   •  omeprazole (priLOSEC) 20 MG capsule, Take 1 capsule by mouth Daily., Disp: 90 capsule, Rfl: 3  •  potassium chloride 10 MEQ CR tablet, Take 1 tablet by mouth 2 (Two) Times a Day., Disp: 180 tablet, Rfl: 3  •  sodium bicarbonate 650 MG tablet, Take 1 tablet by mouth 2 (Two) Times a Day., Disp: 180 tablet, Rfl: 3  •  vitamin D (ERGOCALCIFEROL) 1.25 MG (62668 UT) capsule capsule, Take 50,000 Units by mouth 1 (One) Time Per Week., Disp: , Rfl:   •  atorvastatin (LIPITOR) 10 MG tablet, Take 1 tablet by mouth Daily., Disp: 90 tablet, Rfl: 3  •  Rivaroxaban (Xarelto) 2.5 MG tablet, Take 1 tablet by mouth 2 (Two) Times a Day., Disp: 180 tablet, Rfl: 3    Past Surgical History:   Procedure Laterality Date   • COLONOSCOPY N/A 7/2/2020    Procedure: COLONOSCOPY WITH ANESTHESIA;  Surgeon: Adrien Brewster MD;  Location: [facility] ENDOSCOPY;  Service: Gastroenterology;  Laterality: N/A;  pre op: diarrhea  post op: polyps  PCP: Joe Velasco MD   • COLONOSCOPY N/A 10/13/2020    Procedure: COLONOSCOPY WITH ANESTHESIA;  Surgeon: Adrien Brewster MD;  Location: [facility] ENDOSCOPY;  Service: Gastroenterology;  Laterality: N/A;  Pre: Chronic Diarrhea, Crohn's  Post: AVM  Dr. Neftali Velasco  CO2 Inflation Used   • CORONARY ARTERY BYPASS GRAFT     • INCISION AND DRAINAGE PERIRECTAL ABSCESS N/A 3/3/2017    Procedure: INCISION AND DRAINAGE OF JEET ANAL ABSCESS;  Surgeon: Lynette ZARAGOZA  "MD Luis;  Location:  [facility] OR;  Service:    • MYRINGOTOMY W/ TUBES Left 04/17/2017    06/10/2016   • TONSILLECTOMY     • TOTAL HIP ARTHROPLASTY         Review of Systems   Constitutional: Negative for activity change, appetite change, fatigue and unexpected weight change.   Respiratory: Negative for shortness of breath.    Cardiovascular: Negative for chest pain, palpitations and leg swelling.   Gastrointestinal: Negative for abdominal pain and blood in stool.   Genitourinary: Negative for difficulty urinating and hematuria.   Musculoskeletal: Positive for myalgias.        Legs cramp with walking       /61   Pulse 61   Ht 182.9 cm (72\")   Wt 78 kg (172 lb)   BMI 23.33 kg/m²   Procedures    Objective   Physical Exam  Constitutional:       Appearance: Normal appearance.   Cardiovascular:      Rate and Rhythm: Normal rate and regular rhythm.      Pulses:           Carotid pulses are on the right side with bruit.       Dorsalis pedis pulses are 0 on the right side and 0 on the left side.        Posterior tibial pulses are 0 on the right side and 0 on the left side.      Heart sounds: Murmur heard.   No friction rub. No gallop.       Comments: Dg at base  Musculoskeletal:      Right lower leg: No edema.      Left lower leg: No edema.   Neurological:      Mental Status: He is alert.         Assessment/Plan   Diagnoses and all orders for this visit:    1. Pre-op evaluation (Primary)  Comments:  low risk for MACE with the planned procedure  Orders:  -     ECG 12 Lead    2. IHD (ischemic heart disease)  Comments:  no angina    3. PAD (peripheral artery disease) (CMS/[facility])  Comments:  multi-site atherosclerosis - add Xarelto to prevent atherothrombosis  Orders:  -     atorvastatin (LIPITOR) 10 MG tablet; Take 1 tablet by mouth Daily.  Dispense: 90 tablet; Refill: 3  -     Rivaroxaban (Xarelto) 2.5 MG tablet; Take 1 tablet by mouth 2 (Two) Times a Day.  Dispense: 180 tablet; Refill: 3    4. Stenosis of right " carotid artery  Comments:  plan for CEA    5. Hypertension, benign  Comments:  good control by home readings      Mod complex visit with review of records and hx/pe to assess risk for surgery. Additional meds. Total chart time is 35 mins           Return in about 6 months (around 10/27/2021).  Orders Placed This Encounter   Procedures   • ECG 12 Lead     Order Specific Question:   Reason for Exam:     Answer:   carotid stenosis. htn     Order Specific Question:   Release to patient     Answer:   Immediate

## 2021-04-30 ENCOUNTER — PREP FOR SURGERY (OUTPATIENT)
Dept: OTHER | Facility: HOSPITAL | Age: 71
End: 2021-04-30

## 2021-04-30 DIAGNOSIS — I65.21 STENOSIS OF RIGHT CAROTID ARTERY: Primary | ICD-10-CM

## 2021-04-30 DIAGNOSIS — Z51.81 ENCOUNTER FOR MONITORING ANTIPLATELET THERAPY: ICD-10-CM

## 2021-04-30 DIAGNOSIS — Z01.818 PREOP TESTING: ICD-10-CM

## 2021-04-30 DIAGNOSIS — Z79.02 ENCOUNTER FOR MONITORING ANTIPLATELET THERAPY: ICD-10-CM

## 2021-04-30 RX ORDER — BUPIVACAINE HCL/0.9 % NACL/PF 0.1 %
2 PLASTIC BAG, INJECTION (ML) EPIDURAL ONCE
Status: CANCELLED | OUTPATIENT
Start: 2021-04-30 | End: 2021-04-30

## 2021-05-04 ENCOUNTER — TRANSCRIBE ORDERS (OUTPATIENT)
Dept: ADMINISTRATIVE | Facility: HOSPITAL | Age: 71
End: 2021-05-04

## 2021-05-04 ENCOUNTER — TELEPHONE (OUTPATIENT)
Dept: VASCULAR SURGERY | Facility: CLINIC | Age: 71
End: 2021-05-04

## 2021-05-04 DIAGNOSIS — Z11.59 SCREENING FOR VIRAL DISEASE: Primary | ICD-10-CM

## 2021-05-04 PROBLEM — I65.21 STENOSIS OF RIGHT CAROTID ARTERY: Status: ACTIVE | Noted: 2021-05-04

## 2021-05-04 PROBLEM — Z01.818 PREOP TESTING: Status: ACTIVE | Noted: 2021-05-04

## 2021-05-04 NOTE — TELEPHONE ENCOUNTER
Left message on both the patient phone and the son, emergency contact, requesting a call back.  Patient pre work is scheduled for 5/7/2021 at 915 am.  Patient procedure is scheduled for 5/10/2021 at 900 am. Advised of office phon number for patient to return my call.

## 2021-05-04 NOTE — TELEPHONE ENCOUNTER
Spoke with patient and advised of upcoming procedure.  Patient pre work is scheduled for 5/7/2021 at 915 am.  Patient procedure is scheduled for 5/10/2021 at 900 am. Patient advised of covid 19 testing and visitation policy, including no over night guests.   Patient advised of location time and prep.  Patient expressed understanding for all that was discussed.

## 2021-05-06 ENCOUNTER — TELEPHONE (OUTPATIENT)
Dept: VASCULAR SURGERY | Facility: CLINIC | Age: 71
End: 2021-05-06

## 2021-05-06 NOTE — TELEPHONE ENCOUNTER
Called patient to advise of the below and he stated that he is not currently taking the Xarelto. Advised Elena.    ----- Message from STERLING Murray sent at 5/5/2021  5:15 PM CDT -----  2 days  ----- Message -----  From: Kassandra Higgins  Sent: 5/4/2021  10:31 AM CDT  To: STERLING Murray    Hold Xarelto fro CEA?

## 2021-05-07 ENCOUNTER — HOSPITAL ENCOUNTER (OUTPATIENT)
Dept: GENERAL RADIOLOGY | Facility: HOSPITAL | Age: 71
Discharge: HOME | End: 2021-05-07

## 2021-05-07 ENCOUNTER — PREP FOR SURGERY (OUTPATIENT)
Dept: OTHER | Facility: HOSPITAL | Age: 71
End: 2021-05-07

## 2021-05-07 ENCOUNTER — TELEPHONE (OUTPATIENT)
Dept: INTERNAL MEDICINE | Facility: CLINIC | Age: 71
End: 2021-05-07

## 2021-05-07 ENCOUNTER — LAB (OUTPATIENT)
Dept: LAB | Facility: HOSPITAL | Age: 71
End: 2021-05-07

## 2021-05-07 ENCOUNTER — PRE-ADMISSION TESTING (OUTPATIENT)
Dept: PREADMISSION TESTING | Facility: HOSPITAL | Age: 71
End: 2021-05-07

## 2021-05-07 VITALS
RESPIRATION RATE: 20 BRPM | BODY MASS INDEX: 23.59 KG/M2 | OXYGEN SATURATION: 99 % | SYSTOLIC BLOOD PRESSURE: 152 MMHG | HEIGHT: 72 IN | DIASTOLIC BLOOD PRESSURE: 64 MMHG | WEIGHT: 174.16 LBS | HEART RATE: 73 BPM

## 2021-05-07 DIAGNOSIS — E03.9 ACQUIRED HYPOTHYROIDISM: Primary | ICD-10-CM

## 2021-05-07 DIAGNOSIS — Z79.02 ENCOUNTER FOR MONITORING ANTIPLATELET THERAPY: ICD-10-CM

## 2021-05-07 DIAGNOSIS — I65.21 STENOSIS OF RIGHT CAROTID ARTERY: ICD-10-CM

## 2021-05-07 DIAGNOSIS — Z01.818 PREOP TESTING: ICD-10-CM

## 2021-05-07 DIAGNOSIS — Z51.81 ENCOUNTER FOR MONITORING ANTIPLATELET THERAPY: ICD-10-CM

## 2021-05-07 LAB
ANION GAP SERPL CALCULATED.3IONS-SCNC: 11 MMOL/L (ref 5–15)
APTT PPP: 36 SECONDS (ref 24.1–35)
BASOPHILS # BLD AUTO: 0.04 10*3/MM3 (ref 0–0.2)
BASOPHILS NFR BLD AUTO: 0.6 % (ref 0–1.5)
BUN SERPL-MCNC: 40 MG/DL (ref 8–23)
BUN/CREAT SERPL: 17.4 (ref 7–25)
CALCIUM SPEC-SCNC: 8.9 MG/DL (ref 8.6–10.5)
CHLORIDE SERPL-SCNC: 110 MMOL/L (ref 98–107)
CO2 SERPL-SCNC: 18 MMOL/L (ref 22–29)
CREAT SERPL-MCNC: 2.3 MG/DL (ref 0.76–1.27)
DEPRECATED RDW RBC AUTO: 49.2 FL (ref 37–54)
EOSINOPHIL # BLD AUTO: 0.13 10*3/MM3 (ref 0–0.4)
EOSINOPHIL NFR BLD AUTO: 2 % (ref 0.3–6.2)
ERYTHROCYTE [DISTWIDTH] IN BLOOD BY AUTOMATED COUNT: 13.7 % (ref 12.3–15.4)
GFR SERPL CREATININE-BSD FRML MDRD: 28 ML/MIN/1.73
GLUCOSE SERPL-MCNC: 109 MG/DL (ref 65–99)
HCT VFR BLD AUTO: 24 % (ref 37.5–51)
HGB BLD-MCNC: 7.6 G/DL (ref 13–17.7)
IMM GRANULOCYTES # BLD AUTO: 0.03 10*3/MM3 (ref 0–0.05)
IMM GRANULOCYTES NFR BLD AUTO: 0.5 % (ref 0–0.5)
INR PPP: 1.37 (ref 0.91–1.09)
LYMPHOCYTES # BLD AUTO: 1.21 10*3/MM3 (ref 0.7–3.1)
LYMPHOCYTES NFR BLD AUTO: 18.4 % (ref 19.6–45.3)
MCH RBC QN AUTO: 30.8 PG (ref 26.6–33)
MCHC RBC AUTO-ENTMCNC: 31.7 G/DL (ref 31.5–35.7)
MCV RBC AUTO: 97.2 FL (ref 79–97)
MONOCYTES # BLD AUTO: 0.62 10*3/MM3 (ref 0.1–0.9)
MONOCYTES NFR BLD AUTO: 9.4 % (ref 5–12)
NEUTROPHILS NFR BLD AUTO: 4.54 10*3/MM3 (ref 1.7–7)
NEUTROPHILS NFR BLD AUTO: 69.1 % (ref 42.7–76)
NRBC BLD AUTO-RTO: 0 /100 WBC (ref 0–0.2)
PLATELET # BLD AUTO: 119 10*3/MM3 (ref 140–450)
PMV BLD AUTO: 10.4 FL (ref 6–12)
POTASSIUM SERPL-SCNC: 5 MMOL/L (ref 3.5–5.2)
PROTHROMBIN TIME: 15.8 SECONDS (ref 11.5–13.4)
RBC # BLD AUTO: 2.47 10*6/MM3 (ref 4.14–5.8)
SARS-COV-2 ORF1AB RESP QL NAA+PROBE: NOT DETECTED
SODIUM SERPL-SCNC: 139 MMOL/L (ref 136–145)
WBC # BLD AUTO: 6.57 10*3/MM3 (ref 3.4–10.8)

## 2021-05-07 PROCEDURE — U0004 COV-19 TEST NON-CDC HGH THRU: HCPCS | Performed by: SURGERY

## 2021-05-07 PROCEDURE — 85730 THROMBOPLASTIN TIME PARTIAL: CPT

## 2021-05-07 PROCEDURE — 85610 PROTHROMBIN TIME: CPT

## 2021-05-07 PROCEDURE — 85025 COMPLETE CBC W/AUTO DIFF WBC: CPT

## 2021-05-07 PROCEDURE — 36415 COLL VENOUS BLD VENIPUNCTURE: CPT

## 2021-05-07 PROCEDURE — 80048 BASIC METABOLIC PNL TOTAL CA: CPT

## 2021-05-07 PROCEDURE — 71046 X-RAY EXAM CHEST 2 VIEWS: CPT

## 2021-05-07 PROCEDURE — U0005 INFEC AGEN DETEC AMPLI PROBE: HCPCS | Performed by: SURGERY

## 2021-05-07 PROCEDURE — C9803 HOPD COVID-19 SPEC COLLECT: HCPCS | Performed by: SURGERY

## 2021-05-07 RX ORDER — LEVOTHYROXINE SODIUM 0.07 MG/1
75 TABLET ORAL DAILY
Qty: 90 TABLET | Refills: 3 | Status: SHIPPED | OUTPATIENT
Start: 2021-05-07 | End: 2022-04-29 | Stop reason: SDUPTHER

## 2021-05-07 NOTE — TELEPHONE ENCOUNTER
----- Message from Joe Velasco MD sent at 5/6/2021  7:44 AM CDT -----  Increase patient's Synthroid to 75 mcg

## 2021-05-10 ENCOUNTER — ANESTHESIA EVENT (OUTPATIENT)
Dept: PERIOP | Facility: HOSPITAL | Age: 71
End: 2021-05-10

## 2021-05-10 ENCOUNTER — HOSPITAL ENCOUNTER (INPATIENT)
Facility: HOSPITAL | Age: 71
LOS: 1 days | Discharge: HOME | End: 2021-05-11
Attending: SURGERY | Admitting: SURGERY

## 2021-05-10 ENCOUNTER — ANESTHESIA (OUTPATIENT)
Dept: PERIOP | Facility: HOSPITAL | Age: 71
End: 2021-05-10

## 2021-05-10 ENCOUNTER — APPOINTMENT (OUTPATIENT)
Dept: ULTRASOUND IMAGING | Facility: HOSPITAL | Age: 71
End: 2021-05-10

## 2021-05-10 DIAGNOSIS — Z01.818 PREOP TESTING: ICD-10-CM

## 2021-05-10 DIAGNOSIS — I65.21 STENOSIS OF RIGHT CAROTID ARTERY: ICD-10-CM

## 2021-05-10 DIAGNOSIS — G89.18 ACUTE POST-OPERATIVE PAIN: Primary | ICD-10-CM

## 2021-05-10 LAB
ABO GROUP BLD: NORMAL
BASOPHILS # BLD AUTO: 0.05 10*3/MM3 (ref 0–0.2)
BASOPHILS NFR BLD AUTO: 0.9 % (ref 0–1.5)
BLD GP AB SCN SERPL QL: NEGATIVE
DEPRECATED RDW RBC AUTO: 47.9 FL (ref 37–54)
EOSINOPHIL # BLD AUTO: 0.11 10*3/MM3 (ref 0–0.4)
EOSINOPHIL NFR BLD AUTO: 2 % (ref 0.3–6.2)
ERYTHROCYTE [DISTWIDTH] IN BLOOD BY AUTOMATED COUNT: 13.5 % (ref 12.3–15.4)
HCT VFR BLD AUTO: 26.7 % (ref 37.5–51)
HGB BLD-MCNC: 8.3 G/DL (ref 13–17.7)
IMM GRANULOCYTES # BLD AUTO: 0.02 10*3/MM3 (ref 0–0.05)
IMM GRANULOCYTES NFR BLD AUTO: 0.4 % (ref 0–0.5)
LYMPHOCYTES # BLD AUTO: 1.15 10*3/MM3 (ref 0.7–3.1)
LYMPHOCYTES NFR BLD AUTO: 21.3 % (ref 19.6–45.3)
MCH RBC QN AUTO: 30.2 PG (ref 26.6–33)
MCHC RBC AUTO-ENTMCNC: 31.1 G/DL (ref 31.5–35.7)
MCV RBC AUTO: 97.1 FL (ref 79–97)
MONOCYTES # BLD AUTO: 0.58 10*3/MM3 (ref 0.1–0.9)
MONOCYTES NFR BLD AUTO: 10.7 % (ref 5–12)
NEUTROPHILS NFR BLD AUTO: 3.49 10*3/MM3 (ref 1.7–7)
NEUTROPHILS NFR BLD AUTO: 64.7 % (ref 42.7–76)
NRBC BLD AUTO-RTO: 0 /100 WBC (ref 0–0.2)
PLATELET # BLD AUTO: 113 10*3/MM3 (ref 140–450)
PMV BLD AUTO: 10.5 FL (ref 6–12)
RBC # BLD AUTO: 2.75 10*6/MM3 (ref 4.14–5.8)
RH BLD: NEGATIVE
T&S EXPIRATION DATE: NORMAL
WBC # BLD AUTO: 5.4 10*3/MM3 (ref 3.4–10.8)

## 2021-05-10 PROCEDURE — 93882 EXTRACRANIAL UNI/LTD STUDY: CPT

## 2021-05-10 PROCEDURE — 25010000002 FENTANYL CITRATE (PF) 100 MCG/2ML SOLUTION: Performed by: NURSE ANESTHETIST, CERTIFIED REGISTERED

## 2021-05-10 PROCEDURE — 25010000002 DEXAMETHASONE PER 1 MG: Performed by: NURSE ANESTHETIST, CERTIFIED REGISTERED

## 2021-05-10 PROCEDURE — 86901 BLOOD TYPING SEROLOGIC RH(D): CPT | Performed by: NURSE PRACTITIONER

## 2021-05-10 PROCEDURE — 25010000002 CEFAZOLIN PER 500 MG: Performed by: SURGERY

## 2021-05-10 PROCEDURE — 25010000002 CEFAZOLIN PER 500 MG: Performed by: NURSE PRACTITIONER

## 2021-05-10 PROCEDURE — 4A10X4Z MONITORING OF CENTRAL NERVOUS ELECTRICAL ACTIVITY, EXTERNAL APPROACH: ICD-10-PCS | Performed by: SURGERY

## 2021-05-10 PROCEDURE — 86900 BLOOD TYPING SEROLOGIC ABO: CPT | Performed by: NURSE PRACTITIONER

## 2021-05-10 PROCEDURE — C1768 GRAFT, VASCULAR: HCPCS | Performed by: SURGERY

## 2021-05-10 PROCEDURE — 94799 UNLISTED PULMONARY SVC/PX: CPT

## 2021-05-10 PROCEDURE — 85025 COMPLETE CBC W/AUTO DIFF WBC: CPT | Performed by: NURSE PRACTITIONER

## 2021-05-10 PROCEDURE — 86850 RBC ANTIBODY SCREEN: CPT | Performed by: NURSE PRACTITIONER

## 2021-05-10 PROCEDURE — 35301 RECHANNELING OF ARTERY: CPT | Performed by: SURGERY

## 2021-05-10 PROCEDURE — 25010000002 PROPOFOL 10 MG/ML EMULSION: Performed by: NURSE ANESTHETIST, CERTIFIED REGISTERED

## 2021-05-10 PROCEDURE — 25010000003 CEFAZOLIN PER 500 MG: Performed by: SURGERY

## 2021-05-10 PROCEDURE — 25010000002 PHENYLEPHRINE 10 MG/ML SOLUTION 1 ML VIAL: Performed by: NURSE ANESTHETIST, CERTIFIED REGISTERED

## 2021-05-10 PROCEDURE — 25010000002 ONDANSETRON PER 1 MG: Performed by: NURSE ANESTHETIST, CERTIFIED REGISTERED

## 2021-05-10 PROCEDURE — 88304 TISSUE EXAM BY PATHOLOGIST: CPT | Performed by: SURGERY

## 2021-05-10 PROCEDURE — 25010000002 HEPARIN (PORCINE) PER 1000 UNITS: Performed by: NURSE ANESTHETIST, CERTIFIED REGISTERED

## 2021-05-10 PROCEDURE — 88311 DECALCIFY TISSUE: CPT | Performed by: SURGERY

## 2021-05-10 PROCEDURE — 03CK0ZZ EXTIRPATION OF MATTER FROM RIGHT INTERNAL CAROTID ARTERY, OPEN APPROACH: ICD-10-PCS | Performed by: SURGERY

## 2021-05-10 PROCEDURE — 03UK0KZ SUPPLEMENT RIGHT INTERNAL CAROTID ARTERY WITH NONAUTOLOGOUS TISSUE SUBSTITUTE, OPEN APPROACH: ICD-10-PCS | Performed by: SURGERY

## 2021-05-10 PROCEDURE — 25010000003 LIDOCAINE 1 % SOLUTION: Performed by: SURGERY

## 2021-05-10 PROCEDURE — 25010000002 HEPARIN (PORCINE) PER 1000 UNITS: Performed by: SURGERY

## 2021-05-10 DEVICE — PTCH VASC XENOSURE BIO 0.8X8CM: Type: IMPLANTABLE DEVICE | Site: CAROTID | Status: FUNCTIONAL

## 2021-05-10 RX ORDER — ROCURONIUM BROMIDE 10 MG/ML
INJECTION, SOLUTION INTRAVENOUS AS NEEDED
Status: DISCONTINUED | OUTPATIENT
Start: 2021-05-10 | End: 2021-05-10 | Stop reason: SURG

## 2021-05-10 RX ORDER — NALOXONE HCL 0.4 MG/ML
0.4 VIAL (ML) INJECTION
Status: DISCONTINUED | OUTPATIENT
Start: 2021-05-10 | End: 2021-05-11 | Stop reason: HOSPADM

## 2021-05-10 RX ORDER — ONDANSETRON 4 MG/1
4 TABLET, FILM COATED ORAL EVERY 6 HOURS PRN
Status: DISCONTINUED | OUTPATIENT
Start: 2021-05-10 | End: 2021-05-11 | Stop reason: HOSPADM

## 2021-05-10 RX ORDER — BUPIVACAINE HCL/0.9 % NACL/PF 0.1 %
2 PLASTIC BAG, INJECTION (ML) EPIDURAL ONCE
Status: COMPLETED | OUTPATIENT
Start: 2021-05-10 | End: 2021-05-10

## 2021-05-10 RX ORDER — NEOSTIGMINE METHYLSULFATE 5 MG/5 ML
SYRINGE (ML) INTRAVENOUS AS NEEDED
Status: DISCONTINUED | OUTPATIENT
Start: 2021-05-10 | End: 2021-05-10 | Stop reason: SURG

## 2021-05-10 RX ORDER — FLUMAZENIL 0.1 MG/ML
0.2 INJECTION INTRAVENOUS AS NEEDED
Status: DISCONTINUED | OUTPATIENT
Start: 2021-05-10 | End: 2021-05-10 | Stop reason: HOSPADM

## 2021-05-10 RX ORDER — CLONIDINE HYDROCHLORIDE 0.2 MG/1
0.6 TABLET ORAL DAILY
Status: DISCONTINUED | OUTPATIENT
Start: 2021-05-11 | End: 2021-05-11 | Stop reason: HOSPADM

## 2021-05-10 RX ORDER — ONDANSETRON 2 MG/ML
4 INJECTION INTRAMUSCULAR; INTRAVENOUS EVERY 6 HOURS PRN
Status: DISCONTINUED | OUTPATIENT
Start: 2021-05-10 | End: 2021-05-11 | Stop reason: HOSPADM

## 2021-05-10 RX ORDER — SODIUM CHLORIDE, SODIUM LACTATE, POTASSIUM CHLORIDE, CALCIUM CHLORIDE 600; 310; 30; 20 MG/100ML; MG/100ML; MG/100ML; MG/100ML
1000 INJECTION, SOLUTION INTRAVENOUS CONTINUOUS
Status: DISCONTINUED | OUTPATIENT
Start: 2021-05-10 | End: 2021-05-10 | Stop reason: HOSPADM

## 2021-05-10 RX ORDER — SODIUM CHLORIDE 0.9 % (FLUSH) 0.9 %
10 SYRINGE (ML) INJECTION AS NEEDED
Status: DISCONTINUED | OUTPATIENT
Start: 2021-05-10 | End: 2021-05-11 | Stop reason: HOSPADM

## 2021-05-10 RX ORDER — LIDOCAINE HYDROCHLORIDE 10 MG/ML
0.5 INJECTION, SOLUTION EPIDURAL; INFILTRATION; INTRACAUDAL; PERINEURAL ONCE AS NEEDED
Status: DISCONTINUED | OUTPATIENT
Start: 2021-05-10 | End: 2021-05-10 | Stop reason: HOSPADM

## 2021-05-10 RX ORDER — ASPIRIN 81 MG/1
81 TABLET ORAL DAILY
Status: DISCONTINUED | OUTPATIENT
Start: 2021-05-10 | End: 2021-05-11 | Stop reason: HOSPADM

## 2021-05-10 RX ORDER — SODIUM CHLORIDE 0.9 % (FLUSH) 0.9 %
3 SYRINGE (ML) INJECTION AS NEEDED
Status: DISCONTINUED | OUTPATIENT
Start: 2021-05-10 | End: 2021-05-10 | Stop reason: HOSPADM

## 2021-05-10 RX ORDER — PANTOPRAZOLE SODIUM 40 MG/1
40 TABLET, DELAYED RELEASE ORAL
Status: DISCONTINUED | OUTPATIENT
Start: 2021-05-11 | End: 2021-05-11 | Stop reason: HOSPADM

## 2021-05-10 RX ORDER — POTASSIUM CHLORIDE 750 MG/1
10 CAPSULE, EXTENDED RELEASE ORAL 2 TIMES DAILY WITH MEALS
Status: DISCONTINUED | OUTPATIENT
Start: 2021-05-10 | End: 2021-05-11 | Stop reason: HOSPADM

## 2021-05-10 RX ORDER — MESALAMINE 0.38 G/1
1.5 CAPSULE, EXTENDED RELEASE ORAL DAILY
Status: DISCONTINUED | OUTPATIENT
Start: 2021-05-10 | End: 2021-05-11 | Stop reason: HOSPADM

## 2021-05-10 RX ORDER — BUPIVACAINE HCL/0.9 % NACL/PF 0.1 %
2 PLASTIC BAG, INJECTION (ML) EPIDURAL EVERY 8 HOURS
Status: COMPLETED | OUTPATIENT
Start: 2021-05-10 | End: 2021-05-11

## 2021-05-10 RX ORDER — LABETALOL HYDROCHLORIDE 5 MG/ML
5 INJECTION, SOLUTION INTRAVENOUS
Status: DISCONTINUED | OUTPATIENT
Start: 2021-05-10 | End: 2021-05-10 | Stop reason: HOSPADM

## 2021-05-10 RX ORDER — SODIUM CHLORIDE 9 MG/ML
50 INJECTION, SOLUTION INTRAVENOUS CONTINUOUS
Status: DISCONTINUED | OUTPATIENT
Start: 2021-05-10 | End: 2021-05-11 | Stop reason: HOSPADM

## 2021-05-10 RX ORDER — OXYCODONE AND ACETAMINOPHEN 10; 325 MG/1; MG/1
1 TABLET ORAL ONCE AS NEEDED
Status: DISCONTINUED | OUTPATIENT
Start: 2021-05-10 | End: 2021-05-10 | Stop reason: HOSPADM

## 2021-05-10 RX ORDER — FENTANYL CITRATE 50 UG/ML
25 INJECTION, SOLUTION INTRAMUSCULAR; INTRAVENOUS
Status: DISCONTINUED | OUTPATIENT
Start: 2021-05-10 | End: 2021-05-10 | Stop reason: HOSPADM

## 2021-05-10 RX ORDER — CETIRIZINE HYDROCHLORIDE 10 MG/1
5 TABLET ORAL DAILY
Status: DISCONTINUED | OUTPATIENT
Start: 2021-05-10 | End: 2021-05-11 | Stop reason: HOSPADM

## 2021-05-10 RX ORDER — LABETALOL HYDROCHLORIDE 5 MG/ML
20 INJECTION, SOLUTION INTRAVENOUS
Status: DISCONTINUED | OUTPATIENT
Start: 2021-05-10 | End: 2021-05-11 | Stop reason: HOSPADM

## 2021-05-10 RX ORDER — HYDROMORPHONE HYDROCHLORIDE 1 MG/ML
0.5 INJECTION, SOLUTION INTRAMUSCULAR; INTRAVENOUS; SUBCUTANEOUS
Status: DISCONTINUED | OUTPATIENT
Start: 2021-05-10 | End: 2021-05-10 | Stop reason: HOSPADM

## 2021-05-10 RX ORDER — LIDOCAINE HYDROCHLORIDE 40 MG/ML
SOLUTION TOPICAL AS NEEDED
Status: DISCONTINUED | OUTPATIENT
Start: 2021-05-10 | End: 2021-05-10 | Stop reason: SURG

## 2021-05-10 RX ORDER — METOPROLOL SUCCINATE 25 MG/1
25 TABLET, EXTENDED RELEASE ORAL DAILY
Status: DISCONTINUED | OUTPATIENT
Start: 2021-05-11 | End: 2021-05-11 | Stop reason: HOSPADM

## 2021-05-10 RX ORDER — NALOXONE HCL 0.4 MG/ML
0.04 VIAL (ML) INJECTION AS NEEDED
Status: DISCONTINUED | OUTPATIENT
Start: 2021-05-10 | End: 2021-05-10 | Stop reason: HOSPADM

## 2021-05-10 RX ORDER — DIPHENHYDRAMINE HCL 25 MG
25 CAPSULE ORAL EVERY 6 HOURS PRN
Status: DISCONTINUED | OUTPATIENT
Start: 2021-05-10 | End: 2021-05-11 | Stop reason: HOSPADM

## 2021-05-10 RX ORDER — IBUPROFEN 600 MG/1
600 TABLET ORAL ONCE AS NEEDED
Status: DISCONTINUED | OUTPATIENT
Start: 2021-05-10 | End: 2021-05-10 | Stop reason: HOSPADM

## 2021-05-10 RX ORDER — DEXAMETHASONE SODIUM PHOSPHATE 4 MG/ML
INJECTION, SOLUTION INTRA-ARTICULAR; INTRALESIONAL; INTRAMUSCULAR; INTRAVENOUS; SOFT TISSUE AS NEEDED
Status: DISCONTINUED | OUTPATIENT
Start: 2021-05-10 | End: 2021-05-10 | Stop reason: SURG

## 2021-05-10 RX ORDER — DEXTROSE MONOHYDRATE 25 G/50ML
12.5 INJECTION, SOLUTION INTRAVENOUS AS NEEDED
Status: DISCONTINUED | OUTPATIENT
Start: 2021-05-10 | End: 2021-05-10 | Stop reason: HOSPADM

## 2021-05-10 RX ORDER — ACETAMINOPHEN 325 MG/1
650 TABLET ORAL EVERY 4 HOURS PRN
Status: DISCONTINUED | OUTPATIENT
Start: 2021-05-10 | End: 2021-05-11 | Stop reason: HOSPADM

## 2021-05-10 RX ORDER — SODIUM CHLORIDE, SODIUM LACTATE, POTASSIUM CHLORIDE, CALCIUM CHLORIDE 600; 310; 30; 20 MG/100ML; MG/100ML; MG/100ML; MG/100ML
9 INJECTION, SOLUTION INTRAVENOUS CONTINUOUS
Status: DISCONTINUED | OUTPATIENT
Start: 2021-05-10 | End: 2021-05-10 | Stop reason: HOSPADM

## 2021-05-10 RX ORDER — HYDROCODONE BITARTRATE AND ACETAMINOPHEN 5; 325 MG/1; MG/1
1 TABLET ORAL EVERY 4 HOURS PRN
Status: DISCONTINUED | OUTPATIENT
Start: 2021-05-10 | End: 2021-05-11 | Stop reason: HOSPADM

## 2021-05-10 RX ORDER — FLUTICASONE PROPIONATE 50 MCG
2 SPRAY, SUSPENSION (ML) NASAL DAILY PRN
Status: DISCONTINUED | OUTPATIENT
Start: 2021-05-10 | End: 2021-05-11 | Stop reason: HOSPADM

## 2021-05-10 RX ORDER — ONDANSETRON 2 MG/ML
4 INJECTION INTRAMUSCULAR; INTRAVENOUS AS NEEDED
Status: DISCONTINUED | OUTPATIENT
Start: 2021-05-10 | End: 2021-05-10 | Stop reason: HOSPADM

## 2021-05-10 RX ORDER — NICARDIPINE HYDROCHLORIDE 2.5 MG/ML
INJECTION INTRAVENOUS AS NEEDED
Status: DISCONTINUED | OUTPATIENT
Start: 2021-05-10 | End: 2021-05-10 | Stop reason: SURG

## 2021-05-10 RX ORDER — PROPOFOL 10 MG/ML
VIAL (ML) INTRAVENOUS AS NEEDED
Status: DISCONTINUED | OUTPATIENT
Start: 2021-05-10 | End: 2021-05-10 | Stop reason: SURG

## 2021-05-10 RX ORDER — SODIUM CHLORIDE 0.9 % (FLUSH) 0.9 %
10 SYRINGE (ML) INJECTION AS NEEDED
Status: DISCONTINUED | OUTPATIENT
Start: 2021-05-10 | End: 2021-05-10 | Stop reason: HOSPADM

## 2021-05-10 RX ORDER — LIDOCAINE HYDROCHLORIDE 10 MG/ML
INJECTION, SOLUTION INFILTRATION; PERINEURAL AS NEEDED
Status: DISCONTINUED | OUTPATIENT
Start: 2021-05-10 | End: 2021-05-10 | Stop reason: HOSPADM

## 2021-05-10 RX ORDER — ALBUTEROL SULFATE 2.5 MG/3ML
2.5 SOLUTION RESPIRATORY (INHALATION) EVERY 4 HOURS PRN
Status: DISCONTINUED | OUTPATIENT
Start: 2021-05-10 | End: 2021-05-11 | Stop reason: HOSPADM

## 2021-05-10 RX ORDER — AZELASTINE 1 MG/ML
1 SPRAY, METERED NASAL 2 TIMES DAILY PRN
Status: DISCONTINUED | OUTPATIENT
Start: 2021-05-10 | End: 2021-05-11 | Stop reason: HOSPADM

## 2021-05-10 RX ORDER — SODIUM CHLORIDE 0.9 % (FLUSH) 0.9 %
3 SYRINGE (ML) INJECTION EVERY 12 HOURS SCHEDULED
Status: DISCONTINUED | OUTPATIENT
Start: 2021-05-10 | End: 2021-05-11 | Stop reason: HOSPADM

## 2021-05-10 RX ORDER — AMLODIPINE BESYLATE 10 MG/1
10 TABLET ORAL DAILY
Status: DISCONTINUED | OUTPATIENT
Start: 2021-05-11 | End: 2021-05-11 | Stop reason: HOSPADM

## 2021-05-10 RX ORDER — SODIUM CHLORIDE 0.9 % (FLUSH) 0.9 %
10 SYRINGE (ML) INJECTION EVERY 12 HOURS SCHEDULED
Status: DISCONTINUED | OUTPATIENT
Start: 2021-05-10 | End: 2021-05-10 | Stop reason: HOSPADM

## 2021-05-10 RX ORDER — MULTIPLE VITAMINS W/ MINERALS TAB 9MG-400MCG
1 TAB ORAL DAILY
Status: DISCONTINUED | OUTPATIENT
Start: 2021-05-10 | End: 2021-05-11 | Stop reason: HOSPADM

## 2021-05-10 RX ORDER — LEVOTHYROXINE SODIUM 0.07 MG/1
75 TABLET ORAL
Status: DISCONTINUED | OUTPATIENT
Start: 2021-05-11 | End: 2021-05-11 | Stop reason: HOSPADM

## 2021-05-10 RX ORDER — LIDOCAINE HYDROCHLORIDE 20 MG/ML
INJECTION, SOLUTION EPIDURAL; INFILTRATION; INTRACAUDAL; PERINEURAL AS NEEDED
Status: DISCONTINUED | OUTPATIENT
Start: 2021-05-10 | End: 2021-05-10 | Stop reason: SURG

## 2021-05-10 RX ORDER — FENTANYL CITRATE 50 UG/ML
INJECTION, SOLUTION INTRAMUSCULAR; INTRAVENOUS AS NEEDED
Status: DISCONTINUED | OUTPATIENT
Start: 2021-05-10 | End: 2021-05-10 | Stop reason: SURG

## 2021-05-10 RX ORDER — ONDANSETRON 2 MG/ML
INJECTION INTRAMUSCULAR; INTRAVENOUS AS NEEDED
Status: DISCONTINUED | OUTPATIENT
Start: 2021-05-10 | End: 2021-05-10 | Stop reason: SURG

## 2021-05-10 RX ORDER — HEPARIN SODIUM 1000 [USP'U]/ML
INJECTION, SOLUTION INTRAVENOUS; SUBCUTANEOUS AS NEEDED
Status: DISCONTINUED | OUTPATIENT
Start: 2021-05-10 | End: 2021-05-10 | Stop reason: SURG

## 2021-05-10 RX ORDER — BUPIVACAINE HYDROCHLORIDE 5 MG/ML
INJECTION, SOLUTION PERINEURAL AS NEEDED
Status: DISCONTINUED | OUTPATIENT
Start: 2021-05-10 | End: 2021-05-10 | Stop reason: HOSPADM

## 2021-05-10 RX ORDER — SODIUM BICARBONATE 650 MG/1
650 TABLET ORAL 2 TIMES DAILY
Status: DISCONTINUED | OUTPATIENT
Start: 2021-05-10 | End: 2021-05-11 | Stop reason: HOSPADM

## 2021-05-10 RX ORDER — ATORVASTATIN CALCIUM 10 MG/1
10 TABLET, FILM COATED ORAL NIGHTLY
Status: DISCONTINUED | OUTPATIENT
Start: 2021-05-10 | End: 2021-05-11 | Stop reason: HOSPADM

## 2021-05-10 RX ADMIN — DEXAMETHASONE SODIUM PHOSPHATE 4 MG: 4 INJECTION, SOLUTION INTRA-ARTICULAR; INTRALESIONAL; INTRAMUSCULAR; INTRAVENOUS; SOFT TISSUE at 13:20

## 2021-05-10 RX ADMIN — Medication 2 G: at 13:16

## 2021-05-10 RX ADMIN — POTASSIUM CHLORIDE 10 MEQ: 750 CAPSULE, EXTENDED RELEASE ORAL at 17:24

## 2021-05-10 RX ADMIN — CEFAZOLIN SODIUM 2 G: 10 INJECTION, POWDER, FOR SOLUTION INTRAVENOUS at 21:32

## 2021-05-10 RX ADMIN — MESALAMINE 1.5 G: 375 CAPSULE, EXTENDED RELEASE ORAL at 17:28

## 2021-05-10 RX ADMIN — GLYCOPYRROLATE 0.4 MG: 0.2 INJECTION, SOLUTION INTRAMUSCULAR; INTRAVENOUS at 14:41

## 2021-05-10 RX ADMIN — LIDOCAINE HYDROCHLORIDE 100 MG: 20 INJECTION, SOLUTION EPIDURAL; INFILTRATION; INTRACAUDAL; PERINEURAL at 13:09

## 2021-05-10 RX ADMIN — LIDOCAINE HYDROCHLORIDE 1 EACH: 40 SOLUTION TOPICAL at 13:10

## 2021-05-10 RX ADMIN — SODIUM CHLORIDE, POTASSIUM CHLORIDE, SODIUM LACTATE AND CALCIUM CHLORIDE 1000 ML: 600; 310; 30; 20 INJECTION, SOLUTION INTRAVENOUS at 11:02

## 2021-05-10 RX ADMIN — FENTANYL CITRATE 100 MCG: 50 INJECTION, SOLUTION INTRAMUSCULAR; INTRAVENOUS at 13:05

## 2021-05-10 RX ADMIN — ASPIRIN 81 MG: 81 TABLET, FILM COATED ORAL at 17:24

## 2021-05-10 RX ADMIN — CETIRIZINE HYDROCHLORIDE 5 MG: 10 TABLET, FILM COATED ORAL at 17:24

## 2021-05-10 RX ADMIN — SODIUM CHLORIDE 50 ML/HR: 9 INJECTION, SOLUTION INTRAVENOUS at 17:54

## 2021-05-10 RX ADMIN — HEPARIN SODIUM 7000 UNITS: 1000 INJECTION, SOLUTION INTRAVENOUS; SUBCUTANEOUS at 13:50

## 2021-05-10 RX ADMIN — ATORVASTATIN CALCIUM 10 MG: 10 TABLET, FILM COATED ORAL at 20:10

## 2021-05-10 RX ADMIN — SODIUM CHLORIDE 250 MCG: 9 INJECTION, SOLUTION INTRAVENOUS at 13:35

## 2021-05-10 RX ADMIN — SODIUM BICARBONATE 650 MG: 650 TABLET ORAL at 20:10

## 2021-05-10 RX ADMIN — HYDROCODONE BITARTRATE AND ACETAMINOPHEN 1 TABLET: 5; 325 TABLET ORAL at 17:24

## 2021-05-10 RX ADMIN — SODIUM CHLORIDE, POTASSIUM CHLORIDE, SODIUM LACTATE AND CALCIUM CHLORIDE 9 ML/HR: 600; 310; 30; 20 INJECTION, SOLUTION INTRAVENOUS at 12:39

## 2021-05-10 RX ADMIN — HEPARIN SODIUM 1000 UNITS: 1000 INJECTION, SOLUTION INTRAVENOUS; SUBCUTANEOUS at 14:04

## 2021-05-10 RX ADMIN — SODIUM CHLORIDE 500 MCG: 9 INJECTION, SOLUTION INTRAVENOUS at 14:43

## 2021-05-10 RX ADMIN — ONDANSETRON 4 MG: 2 INJECTION INTRAMUSCULAR; INTRAVENOUS at 14:32

## 2021-05-10 RX ADMIN — ROCURONIUM BROMIDE 50 MG: 10 INJECTION INTRAVENOUS at 13:09

## 2021-05-10 RX ADMIN — PROPOFOL 100 MG: 10 INJECTION, EMULSION INTRAVENOUS at 13:09

## 2021-05-10 RX ADMIN — SODIUM CHLORIDE 500 MCG: 9 INJECTION, SOLUTION INTRAVENOUS at 14:38

## 2021-05-10 RX ADMIN — SODIUM CHLORIDE 500 MCG: 9 INJECTION, SOLUTION INTRAVENOUS at 14:27

## 2021-05-10 RX ADMIN — Medication 1 TABLET: at 17:24

## 2021-05-10 RX ADMIN — Medication 3 MG: at 14:41

## 2021-05-10 RX ADMIN — SODIUM CHLORIDE 500 MCG: 9 INJECTION, SOLUTION INTRAVENOUS at 14:30

## 2021-05-10 RX ADMIN — PHENYLEPHRINE HYDROCHLORIDE 0.2 MCG/KG/MIN: 10 INJECTION INTRAVENOUS at 13:52

## 2021-05-10 NOTE — ANESTHESIA PREPROCEDURE EVALUATION
Anesthesia Evaluation     Patient summary reviewed   no history of anesthetic complications:  NPO Solid Status: > 8 hours             Airway   Mallampati: II  TM distance: >3 FB  Neck ROM: full  Dental    (+) upper dentures and lower dentures    Pulmonary    (-) COPD, asthma, sleep apnea, not a smoker  Cardiovascular   Exercise tolerance: good (4-7 METS)    ECG reviewed    (+) hypertension, CAD, CABG (2003), PVD, hyperlipidemia,  carotid artery disease  (-) pacemaker, past MI, angina, cardiac stents      Neuro/Psych  (-) seizures, TIA, CVA  GI/Hepatic/Renal/Endo    (+)   renal disease CRI, thyroid problem hypothyroidism  (-) GERD, liver disease, diabetes    Musculoskeletal     Abdominal    Substance History      OB/GYN          Other   blood dyscrasia anemia,                     Anesthesia Plan    ASA 3     general     intravenous induction     Anesthetic plan, all risks, benefits, and alternatives have been provided, discussed and informed consent has been obtained with: patient.  Use of blood products discussed with patient  Consented to blood products.

## 2021-05-10 NOTE — ANESTHESIA PROCEDURE NOTES
Airway  Date/Time: 5/10/2021 1:10 PM  Airway not difficult    General Information and Staff    Patient location during procedure: OR  CRNA: Gary Hassan CRNA    Indications and Patient Condition  Indications for airway management: airway protection    Preoxygenated: yes  Mask difficulty assessment: 2 - vent by mask + OA or adjuvant +/- NMBA    Final Airway Details  Final airway type: endotracheal airway      Successful airway: ETT  Cuffed: yes   Successful intubation technique: direct laryngoscopy  Facilitating devices/methods: intubating stylet  Blade: Jr  Blade size: 3.5  ETT size (mm): 8.0  Cormack-Lehane Classification: grade I - full view of glottis  Placement verified by: chest auscultation and capnometry   Cuff volume (mL): 6  Measured from: lips  ETT/EBT  to lips (cm): 22  Number of attempts at approach: 1  Assessment: lips, teeth, and gum same as pre-op and atraumatic intubation    Additional Comments  ATRAUMATIC INTUBATION

## 2021-05-10 NOTE — ANESTHESIA PROCEDURE NOTES
Arterial Line      Patient location during procedure: holding area  Start time: 5/10/2021 12:26 PM  Stop Time:5/10/2021 12:28 PM       Line placed for hemodynamic monitoring.  Performed By   Anesthesiologist: Twan Serna MD  Preanesthetic Checklist  Completed: patient identified, IV checked, site marked, risks and benefits discussed, surgical consent, monitors and equipment checked, pre-op evaluation and timeout performed  Arterial Line Prep   Sterile Tech: mask, cap and gloves  Prep: ChloraPrep  Patient monitoring: continuous pulse oximetry  Arterial Line Procedure   Laterality:right  Location:  radial artery  Catheter size: 20 G   Guidance: palpation technique  Number of attempts: 1  Successful placement: yes  Post Assessment   Dressing Type: occlusive dressing applied and secured with tape.   Complications no  Circ/Move/Sens Assessment: normal.   Patient Tolerance: patient tolerated the procedure well with no apparent complications

## 2021-05-11 ENCOUNTER — READMISSION MANAGEMENT (OUTPATIENT)
Dept: CALL CENTER | Facility: HOSPITAL | Age: 71
End: 2021-05-11

## 2021-05-11 VITALS
HEART RATE: 73 BPM | WEIGHT: 174.16 LBS | HEIGHT: 72 IN | SYSTOLIC BLOOD PRESSURE: 180 MMHG | BODY MASS INDEX: 23.59 KG/M2 | RESPIRATION RATE: 16 BRPM | OXYGEN SATURATION: 94 % | TEMPERATURE: 98.4 F | DIASTOLIC BLOOD PRESSURE: 64 MMHG

## 2021-05-11 PROCEDURE — 99024 POSTOP FOLLOW-UP VISIT: CPT | Performed by: NURSE PRACTITIONER

## 2021-05-11 PROCEDURE — 25010000002 CEFAZOLIN PER 500 MG: Performed by: SURGERY

## 2021-05-11 RX ORDER — HYDROCODONE BITARTRATE AND ACETAMINOPHEN 5; 325 MG/1; MG/1
1 TABLET ORAL EVERY 4 HOURS PRN
Qty: 30 TABLET | Refills: 0 | Status: SHIPPED | OUTPATIENT
Start: 2021-05-11 | End: 2021-05-18

## 2021-05-11 RX ADMIN — POTASSIUM CHLORIDE 10 MEQ: 750 CAPSULE, EXTENDED RELEASE ORAL at 08:13

## 2021-05-11 RX ADMIN — RIVAROXABAN 2.5 MG: 2.5 TABLET, FILM COATED ORAL at 08:13

## 2021-05-11 RX ADMIN — LEVOTHYROXINE SODIUM 75 MCG: 75 TABLET ORAL at 06:14

## 2021-05-11 RX ADMIN — MESALAMINE 1.5 G: 375 CAPSULE, EXTENDED RELEASE ORAL at 08:11

## 2021-05-11 RX ADMIN — SODIUM BICARBONATE 650 MG: 650 TABLET ORAL at 08:12

## 2021-05-11 RX ADMIN — CETIRIZINE HYDROCHLORIDE 5 MG: 10 TABLET, FILM COATED ORAL at 08:13

## 2021-05-11 RX ADMIN — Medication 1 TABLET: at 08:11

## 2021-05-11 RX ADMIN — CEFAZOLIN SODIUM 2 G: 10 INJECTION, POWDER, FOR SOLUTION INTRAVENOUS at 04:13

## 2021-05-11 RX ADMIN — CLONIDINE HYDROCHLORIDE 0.6 MG: 0.2 TABLET ORAL at 08:12

## 2021-05-11 RX ADMIN — HYDROCODONE BITARTRATE AND ACETAMINOPHEN 1 TABLET: 5; 325 TABLET ORAL at 06:21

## 2021-05-11 RX ADMIN — ASPIRIN 81 MG: 81 TABLET, FILM COATED ORAL at 08:13

## 2021-05-11 RX ADMIN — PANTOPRAZOLE SODIUM 40 MG: 40 TABLET, DELAYED RELEASE ORAL at 06:14

## 2021-05-11 RX ADMIN — METOPROLOL SUCCINATE 25 MG: 25 TABLET, EXTENDED RELEASE ORAL at 08:11

## 2021-05-11 RX ADMIN — SODIUM CHLORIDE 50 ML/HR: 9 INJECTION, SOLUTION INTRAVENOUS at 06:14

## 2021-05-11 RX ADMIN — AMLODIPINE BESYLATE 10 MG: 10 TABLET ORAL at 08:12

## 2021-05-11 NOTE — ANESTHESIA POSTPROCEDURE EVALUATION
Patient: Ricardo Hugo    Procedure Summary     Date: 05/10/21 Room / Location: [facility] OR  / [facility] HYBRID OR 12    Anesthesia Start: 1302 Anesthesia Stop: 1450    Procedure: RIGHT CAROTID ENDARTERECTOMY WITH EEG (Right Neck) Diagnosis:       Stenosis of right carotid artery      Preop testing      (Stenosis of right carotid artery [I65.21])      (Preop testing [Z01.818])    Surgeons: Gil Pineda DO Provider: Gary Hassan CRNA    Anesthesia Type: general ASA Status: 3          Anesthesia Type: general    Vitals  Vitals Value Taken Time   /55 05/10/21 1535   Temp 98 °F (36.7 °C) 05/10/21 1535   Pulse 52 05/10/21 1535   Resp 12 05/10/21 1535   SpO2 100 % 05/10/21 1535           Post Anesthesia Care and Evaluation    Patient location during evaluation: PACU  Patient participation: complete - patient participated  Level of consciousness: awake and alert  Pain management: adequate  Airway patency: patent  Anesthetic complications: No anesthetic complications  PONV Status: none  Cardiovascular status: acceptable and stable  Respiratory status: acceptable and unassisted  Hydration status: acceptable

## 2021-05-12 ENCOUNTER — TRANSITIONAL CARE MANAGEMENT TELEPHONE ENCOUNTER (OUTPATIENT)
Dept: CALL CENTER | Facility: HOSPITAL | Age: 71
End: 2021-05-12

## 2021-05-12 NOTE — OUTREACH NOTE
Call Center TCM Note      Responses   [facility] patient discharged from?  [place]   Does the patient have one of the following disease processes/diagnoses(primary or secondary)?  General Surgery   TCM attempt successful?  Yes   Call start time  1543   Call end time  1545   Discharge diagnosis  s/p right carotid endarterectomy    Does the patient have all medications related to this admission filled (includes all antibiotics, pain medications, etc.)  Yes   Is the patient taking all medications as directed (includes completed medication regime)?  Yes   Does the patient have a follow up appointment scheduled with their surgeon?  Yes   Has the patient kept scheduled appointments due by today?  N/A   Comments  f/u with surgeons office on 5/26   Psychosocial issues?  No   Did the patient receive a copy of their discharge instructions?  Yes   Nursing interventions  Reviewed instructions with patient   What is the patient's perception of their health status since discharge?  Improving   Nursing interventions  Nurse provided patient education   Is the patient /caregiver able to teach back basic post-op care?  Lifting as instructed by MD in discharge instructions, Do not remove steri-strips, Keep incision areas clean,dry and protected, No tub bath, swimming, or hot tub until instructed by MD, Take showers only when approved by MD-sponge bathe until then, Drive as instructed by MD in discharge instructions, Practice 'cough and deep breath', Continue use of incentive spirometry at least 1 week post discharge   Is the patient/caregiver able to teach back signs and symptoms of incisional infection?  Fever   Is the patient/caregiver able to teach back the hierarchy of who to call/visit for symptoms/problems? PCP, Specialist, Home health nurse, Urgent Care, ED, 911  Yes   TCM call completed?  Yes   Wrap up additional comments  States he is doing well, no questions or concerns at this time, states he was instructed to f/u  with surgeon on 5/26, no discharge order to f/u with PCP.          Jovita Schulte RN    5/12/2021, 15:45 EDT

## 2021-05-12 NOTE — OUTREACH NOTE
Prep Survey      Responses   [facility] patient discharged from?  [place]   Is LACE score < 7 ?  No   Emergency Room discharge w/ pulse ox?  No   Eligibility  [facility]   Date of Admission  05/10/21   Date of Discharge  05/11/21   Discharge Disposition  Home or Self Care   Discharge diagnosis  s/p right carotid endarterectomy    Does the patient have one of the following disease processes/diagnoses(primary or secondary)?  General Surgery   Does the patient have Home health ordered?  No   Is there a DME ordered?  No   Prep survey completed?  Yes          Lula Lee, RN

## 2021-05-18 ENCOUNTER — OFFICE VISIT (OUTPATIENT)
Dept: INTERNAL MEDICINE | Facility: CLINIC | Age: 71
End: 2021-05-18

## 2021-05-18 VITALS
WEIGHT: 177 LBS | BODY MASS INDEX: 23.98 KG/M2 | HEART RATE: 61 BPM | HEIGHT: 72 IN | TEMPERATURE: 98 F | OXYGEN SATURATION: 97 % | DIASTOLIC BLOOD PRESSURE: 68 MMHG | SYSTOLIC BLOOD PRESSURE: 134 MMHG

## 2021-05-18 DIAGNOSIS — I10 HYPERTENSION, BENIGN: ICD-10-CM

## 2021-05-18 DIAGNOSIS — I65.21 STENOSIS OF RIGHT CAROTID ARTERY: ICD-10-CM

## 2021-05-18 DIAGNOSIS — I73.9 PAD (PERIPHERAL ARTERY DISEASE) (HCC): ICD-10-CM

## 2021-05-18 DIAGNOSIS — K50.118 CROHN'S DISEASE OF LARGE INTESTINE WITH OTHER COMPLICATION (HCC): ICD-10-CM

## 2021-05-18 DIAGNOSIS — I27.20 PULMONARY HYPERTENSION (HCC): Primary | ICD-10-CM

## 2021-05-18 PROBLEM — M87.059 AVASCULAR NECROSIS OF BONE OF HIP: Status: ACTIVE | Noted: 2021-05-18

## 2021-05-18 PROCEDURE — 99214 OFFICE O/P EST MOD 30 MIN: CPT | Performed by: INTERNAL MEDICINE

## 2021-05-18 NOTE — PROGRESS NOTES
Subjective   Ricardo Hugo is a 71 y.o. male.   Chief Complaint   Patient presents with   • Shortness of Breath     pt states he can't walk very far without being SOB; pt states it has been going on for a long time but has recently gotten worse;    • Poor Circulation     pt states he has poor circulation;        History of Present Illness patient is here for follow-up he has shortness of breath with any activity he also feels very fatigued.  He is recently had carotid endarterectomy and is currently scheduled to have surgery for peripheral artery disease.  He was placed on Xarelto by Dr. Farley his cardiologist however he began having extreme nosebleeds with that he stopped that medication.    The following portions of the patient's history were reviewed and updated as appropriate: allergies, current medications, past family history, past medical history, past social history, past surgical history and problem list.    Review of Systems   Constitutional: Positive for fatigue. Negative for activity change, appetite change, fever, unexpected weight gain and unexpected weight loss.   HENT: Positive for nosebleeds. Negative for swollen glands, trouble swallowing and voice change.    Eyes: Negative for blurred vision and visual disturbance.   Respiratory: Positive for shortness of breath. Negative for cough.    Cardiovascular: Negative for chest pain, palpitations and leg swelling.   Gastrointestinal: Negative for abdominal pain, constipation, diarrhea, nausea, vomiting and indigestion.   Endocrine: Negative for cold intolerance, heat intolerance, polydipsia and polyphagia.   Genitourinary: Negative for dysuria and frequency.   Musculoskeletal: Negative for arthralgias, back pain, joint swelling and neck pain.   Skin: Negative for color change, rash and skin lesions.   Neurological: Negative for dizziness, weakness, headache, memory problem and confusion.   Hematological: Does not bruise/bleed easily.    Psychiatric/Behavioral: Negative for agitation, hallucinations and suicidal ideas. The patient is not nervous/anxious.        Objective   Past Medical History:   Diagnosis Date   • 3-vessel CAD 8/11/2020   • Allergic rhinitis    • Anxiety disorder 4/27/2020   • Arthritis    • Asymmetrical sensorineural hearing loss 6/28/2017   • Atherosclerosis of native artery of both lower extremities with intermittent claudication (CMS/[facility]) 7/18/2019   • Avascular necrosis of femoral head, left (CMS/[facility]) 7/11/2020   • Carotid stenosis    • Chronic mucoid otitis media    • Chronic rhinitis    • Coronary artery disease     HEART BYPASS 2004   • Crohn's disease of large intestine with other complication (CMS/[facility]) 7/30/2020    Chronic diarrhea Colonoscopy July 2020 revealed mild patchy scattered hemosiderin staining with inflammation more so in rectosigmoid area.  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   • Iron deficiency anemia 7/14/2020   • Mixed hearing loss of left ear    • PAD (peripheral artery disease) (CMS/[facility]) 8/11/2020   • Perianal abscess    • Pernicious anemia 8/17/2020   • Personal history of alcoholism (CMS/[facility]) 8/11/2020   • Prostatic hypertrophy 8/11/2020   • Sensorineural hearing loss    • Tinnitus    • Ventricular tachycardia, nonsustained (CMS/[facility]) 7/14/2020   • Weight loss 7/11/2020      Past Surgical History:   Procedure Laterality Date   • CAROTID ENDARTERECTOMY Right 5/10/2021    Procedure: RIGHT CAROTID ENDARTERECTOMY WITH EEG;  Surgeon: Gil Pineda DO;  Location: [facility] OR ;  Service: Vascular;  Laterality: Right;   • COLONOSCOPY  N/A 7/2/2020    Procedure: COLONOSCOPY WITH ANESTHESIA;  Surgeon: Adrien Brewster MD;  Location: [facility] ENDOSCOPY;  Service: Gastroenterology;  Laterality: N/A;  pre op: diarrhea  post op: polyps  PCP: Joe Velasco MD   • COLONOSCOPY N/A 10/13/2020    Procedure: COLONOSCOPY WITH ANESTHESIA;  Surgeon: Adrien Brewster MD;  Location: [facility] ENDOSCOPY;  Service: Gastroenterology;  Laterality: N/A;  Pre: Chronic Diarrhea, Crohn's  Post: AVM  Dr. Neftali Velasco  CO2 Inflation Used   • CORONARY ARTERY BYPASS GRAFT  2003   • EYE SURGERY Bilateral    • INCISION AND DRAINAGE PERIRECTAL ABSCESS N/A 3/3/2017    Procedure: INCISION AND DRAINAGE OF JEET ANAL ABSCESS;  Surgeon: Lynette Smith MD;  Location: [facility] OR;  Service:    • MYRINGOTOMY W/ TUBES Left 04/17/2017    06/10/2016   • TONSILLECTOMY     • TOTAL HIP ARTHROPLASTY Right 2006        Current Outpatient Medications:   •  albuterol sulfate  (90 Base) MCG/ACT inhaler, USE 2 INHALATIONS FOUR TIMES A DAY AS NEEDED, Disp: 20.1 g, Rfl: 3  •  amLODIPine (NORVASC) 10 MG tablet, Take 10 mg by mouth Daily., Disp: , Rfl:   •  aspirin (ASPIR) 81 MG EC tablet, Take 81 mg by mouth Daily., Disp: , Rfl:   •  Aspirin-Acetaminophen-Caffeine (EXCEDRIN EXTRA STRENGTH PO), Take 2 tablets by mouth Daily As Needed (HEADACHES)., Disp: , Rfl:   •  atorvastatin (LIPITOR) 10 MG tablet, Take 1 tablet by mouth Daily., Disp: 90 tablet, Rfl: 3  •  azelastine (ASTELIN) 0.1 % nasal spray, USE 1 SPRAY IN EACH NOSTRIL TWICE A DAY AS NEEDED, Disp: 90 mL, Rfl: 3  •  cholestyramine (QUESTRAN) 4 g packet, Take 1 packet by mouth Daily., Disp: 90 packet, Rfl: 3  •  cloNIDine (CATAPRES) 0.2 MG tablet, TAKE 3 TABLETS DAILY, Disp: 270 tablet, Rfl: 3  •  desoximetasone (TOPICORT) 0.25 % cream, APPLY TO THE AFFECTED AREA TWICE A DAY AS NEEDED, Disp: 30 g, Rfl: 3  •  diphenhydrAMINE (Benadryl Allergy) 25 mg capsule, Take 25 mg by mouth Every 6 (Six) Hours As Needed for Itching., Disp: , Rfl:   •   fexofenadine (ALLEGRA) 180 MG tablet, Take 180 mg by mouth Daily., Disp: , Rfl:   •  fluticasone (FLONASE) 50 MCG/ACT nasal spray, 2 sprays into the nostril(s) as directed by provider Daily As Needed for Rhinitis., Disp: , Rfl:   •  levothyroxine (Synthroid) 75 MCG tablet, Take 1 tablet by mouth Daily., Disp: 90 tablet, Rfl: 3  •  magnesium chloride ER 64 MG DR tablet, Take 143 mg by mouth Daily., Disp: , Rfl:   •  Melatonin 10 MG capsule, Take 2 capsules by mouth Every Night., Disp: , Rfl:   •  mesalamine (APRISO) 0.375 g 24 hr capsule, Take 4 capsules by mouth Daily., Disp: 360 capsule, Rfl: 3  •  metoprolol succinate XL (TOPROL-XL) 25 MG 24 hr tablet, Take 1 tablet by mouth Daily., Disp: 90 tablet, Rfl: 3  •  Multiple Vitamins-Minerals (PRESERVISION/LUTEIN) capsule, Take 1 capsule by mouth 2 (two) times a day., Disp: , Rfl:   •  omeprazole (priLOSEC) 20 MG capsule, Take 1 capsule by mouth Daily., Disp: 90 capsule, Rfl: 3  •  potassium chloride 10 MEQ CR tablet, Take 1 tablet by mouth 2 (Two) Times a Day., Disp: 180 tablet, Rfl: 3  •  sodium bicarbonate 650 MG tablet, Take 1 tablet by mouth 2 (Two) Times a Day., Disp: 180 tablet, Rfl: 3  •  vitamin D (ERGOCALCIFEROL) 1.25 MG (28774 UT) capsule capsule, Take 50,000 Units by mouth 1 (One) Time Per Week., Disp: , Rfl:   •  HYDROcodone-acetaminophen (NORCO) 5-325 MG per tablet, Take 1 tablet by mouth Every 4 (Four) Hours As Needed for Moderate Pain ., Disp: 30 tablet, Rfl: 0  •  Rivaroxaban (Xarelto) 2.5 MG tablet, Take 1 tablet by mouth 2 (Two) Times a Day. (Patient taking differently: Take 2.5 mg by mouth 2 (Two) Times a Day. HAS NOT STARTED THIS YET INSTRUCTED BY DR MCGILL TO WAIT UNITL AFTER SURGERY PER PT), Disp: 180 tablet, Rfl: 3     Vitals:    05/18/21 1533   BP: 134/68   Pulse: 61   Temp: 98 °F (36.7 °C)   SpO2: 97%         05/18/21  1533   Weight: 80.3 kg (177 lb)         Physical Exam  Vitals and nursing note reviewed.   Constitutional:       General: He  is not in acute distress.     Appearance: Normal appearance. He is well-developed.   HENT:      Head: Normocephalic and atraumatic.      Right Ear: External ear normal.      Left Ear: External ear normal.      Nose: Nose normal.   Eyes:      Extraocular Movements: Extraocular movements intact.      Conjunctiva/sclera: Conjunctivae normal.      Pupils: Pupils are equal, round, and reactive to light.   Cardiovascular:      Rate and Rhythm: Normal rate and regular rhythm.      Pulses: Normal pulses.      Heart sounds: Murmur heard.   Systolic murmur is present with a grade of 2/6.     Pulmonary:      Effort: Pulmonary effort is normal.      Breath sounds: Normal breath sounds.   Abdominal:      General: Bowel sounds are normal.      Palpations: Abdomen is soft.   Musculoskeletal:         General: Normal range of motion.      Cervical back: Normal range of motion and neck supple. No rigidity. No muscular tenderness.   Skin:     General: Skin is warm and dry.   Neurological:      General: No focal deficit present.      Mental Status: He is alert and oriented to person, place, and time.   Psychiatric:         Mood and Affect: Mood normal.         Behavior: Behavior normal.               Assessment/Plan   Diagnoses and all orders for this visit:    1. Pulmonary hypertension (CMS/HCC) (Primary)  -     Ambulatory Referral to Pulmonology    2. Hypertension, benign    3. PAD (peripheral artery disease) (CMS/HCC)    4. Stenosis of right carotid artery    5. Crohn's disease of large intestine with other complication (CMS/HCC)        Today's visit was primarily centered on his shortness of breath his lungs are actually fairly clear as I examined him went back and reviewed a 2D echocardiogram from 2020 primarily because of the murmur that he has however on that 2D echo he had moderate pulmonary hypertension which may be contributing somewhat to his shortness of breath we will go ahead and let pulmonary take a look at him make an  appointment with .  Patient is adamant on not taking any kind of anticoagulant due to the severe epistaxis that he experienced.  We will see him back in 3 months for follow-up

## 2021-05-20 ENCOUNTER — READMISSION MANAGEMENT (OUTPATIENT)
Dept: CALL CENTER | Facility: HOSPITAL | Age: 71
End: 2021-05-20

## 2021-05-20 NOTE — OUTREACH NOTE
General Surgery Week 2 Survey      Responses   [facility] patient discharged from?  [place]   Does the patient have one of the following disease processes/diagnoses(primary or secondary)?  General Surgery   Week 2 attempt successful?  Yes   Call start time  1433   Call end time  1435   Discharge diagnosis  s/p right carotid endarterectomy    Meds reviewed with patient/caregiver?  Yes   Is the patient having any side effects they believe may be caused by any medication additions or changes?  No   Does the patient have all medications related to this admission filled (includes all antibiotics, pain medications, etc.)  Yes   Is the patient taking all medications as directed (includes completed medication regime)?  Yes   Does the patient have a follow up appointment scheduled with their surgeon?  Yes   Has the patient kept scheduled appointments due by today?  N/A   Comments  f/u with surgeons office on 5/26   Has home health visited the patient within 72 hours of discharge?  N/A   Psychosocial issues?  No   Did the patient receive a copy of their discharge instructions?  Yes   Nursing interventions  Reviewed instructions with patient   What is the patient's perception of their health status since discharge?  Improving   Nursing interventions  Nurse provided patient education   Is the patient /caregiver able to teach back basic post-op care?  Keep incision areas clean,dry and protected, No tub bath, swimming, or hot tub until instructed by MD, Take showers only when approved by MD-sponge bathe until then   Is the patient/caregiver able to teach back signs and symptoms of incisional infection?  Fever, Increased redness, swelling or pain at the incisonal site, Incisional warmth, Increased drainage or bleeding, Pus or odor from incision   Is the patient/caregiver able to teach back steps to recovery at home?  Rest and rebuild strength, gradually increase activity, Set small, achievable goals for return to baseline  health, Make a list of questions for surgeon's appointment   If the patient is a current smoker, are they able to teach back resources for cessation?  Not a smoker   Is the patient/caregiver able to teach back the hierarchy of who to call/visit for symptoms/problems? PCP, Specialist, Home health nurse, Urgent Care, ED, 911  Yes   Week 2 call completed?  Yes          Nelson Maxwell RN

## 2021-05-25 ENCOUNTER — TELEPHONE (OUTPATIENT)
Dept: VASCULAR SURGERY | Facility: CLINIC | Age: 71
End: 2021-05-25

## 2021-05-25 NOTE — TELEPHONE ENCOUNTER
Spoke with Mr Hugo reminding him of his appointment for Wednesday, May 26th, 2021 at 1 pm with Elena KATZ. Mr Hugo confirmed he would be here.

## 2021-05-26 ENCOUNTER — OFFICE VISIT (OUTPATIENT)
Dept: VASCULAR SURGERY | Facility: CLINIC | Age: 71
End: 2021-05-26

## 2021-05-26 VITALS
HEIGHT: 72 IN | WEIGHT: 168 LBS | BODY MASS INDEX: 22.75 KG/M2 | DIASTOLIC BLOOD PRESSURE: 66 MMHG | HEART RATE: 62 BPM | SYSTOLIC BLOOD PRESSURE: 118 MMHG | OXYGEN SATURATION: 96 %

## 2021-05-26 DIAGNOSIS — N18.9 CHRONIC KIDNEY DISEASE, UNSPECIFIED CKD STAGE: ICD-10-CM

## 2021-05-26 DIAGNOSIS — I73.9 PAD (PERIPHERAL ARTERY DISEASE) (HCC): ICD-10-CM

## 2021-05-26 DIAGNOSIS — I65.23 BILATERAL CAROTID ARTERY STENOSIS: Primary | ICD-10-CM

## 2021-05-26 DIAGNOSIS — I10 ESSENTIAL HYPERTENSION: ICD-10-CM

## 2021-05-26 PROCEDURE — 99024 POSTOP FOLLOW-UP VISIT: CPT | Performed by: NURSE PRACTITIONER

## 2021-05-27 ENCOUNTER — OFFICE VISIT (OUTPATIENT)
Dept: PULMONOLOGY | Facility: CLINIC | Age: 71
End: 2021-05-27

## 2021-05-27 VITALS
BODY MASS INDEX: 22.75 KG/M2 | OXYGEN SATURATION: 98 % | HEIGHT: 72 IN | WEIGHT: 168 LBS | HEART RATE: 68 BPM | DIASTOLIC BLOOD PRESSURE: 60 MMHG | SYSTOLIC BLOOD PRESSURE: 118 MMHG

## 2021-05-27 DIAGNOSIS — N18.4 CKD (CHRONIC KIDNEY DISEASE) STAGE 4, GFR 15-29 ML/MIN (HCC): ICD-10-CM

## 2021-05-27 DIAGNOSIS — R06.02 SHORTNESS OF BREATH: ICD-10-CM

## 2021-05-27 DIAGNOSIS — K50.118 CROHN'S DISEASE OF LARGE INTESTINE WITH OTHER COMPLICATION (HCC): ICD-10-CM

## 2021-05-27 DIAGNOSIS — I27.20 PULMONARY HYPERTENSION (HCC): Primary | ICD-10-CM

## 2021-05-27 DIAGNOSIS — I51.89 DIASTOLIC DYSFUNCTION: ICD-10-CM

## 2021-05-27 PROBLEM — R65.20 SEPSIS WITH ACUTE RENAL FAILURE: Status: RESOLVED | Noted: 2020-09-15 | Resolved: 2021-05-27

## 2021-05-27 PROBLEM — N17.9 SEPSIS WITH ACUTE RENAL FAILURE: Status: RESOLVED | Noted: 2020-09-15 | Resolved: 2021-05-27

## 2021-05-27 PROBLEM — A41.9 SEPSIS WITH ACUTE RENAL FAILURE: Status: RESOLVED | Noted: 2020-09-15 | Resolved: 2021-05-27

## 2021-05-27 PROCEDURE — 94664 DEMO&/EVAL PT USE INHALER: CPT | Performed by: INTERNAL MEDICINE

## 2021-05-27 PROCEDURE — 99204 OFFICE O/P NEW MOD 45 MIN: CPT | Performed by: INTERNAL MEDICINE

## 2021-05-27 RX ORDER — CLOPIDOGREL BISULFATE 75 MG/1
75 TABLET ORAL DAILY
Qty: 30 TABLET | Refills: 5 | Status: SHIPPED | OUTPATIENT
Start: 2021-05-27 | End: 2021-06-14 | Stop reason: SDUPTHER

## 2021-05-27 NOTE — PROGRESS NOTES
05/26/2021       Joe Velasco MD  [address] [place] [address]    Ricardo Hugo  1950    Chief Complaint   Patient presents with   • Follow-up     2 Week Post-Op Follow UP FOr RIGHT CAROTID ENDARTERECTOMY WITH EEG. Patient denies any stroke like symptoms.    • Former Smoker     Patient is a Former Smoker - Quit 2013   • Med Management     Verified medications from list patient brought in. Did verify that patient isnt taking Xarelto and he stated the nose bleeds wouldnt stop so he isnt taking it        Dear Joe Velasco MD       HPI  I had the pleasure of seeing your patient Ricardo Hugo in the office today.  As you recall, Ricardo Hugo is a 71 y.o.  male who you are currently following for routine health maintenance.  He denies any strokelike symptoms.   He does have some claudication to his lower extremities, mostly in the thighs.  He had previous testing showing moderate arterial stenosis.  He was found to have significant carotid disease as well.  He did undergo a right carotid endarterectomy on 5/10/2021 and is now back for follow-up.  His right neck incision has healed.  He remains neurologically intact.  He does have some numbness along the jawline which is improving.  He was taking aspirin and Xarelto 2.5 mg however reports he was having nosebleeding that would not stop so he stopped discontinued the aspirin on his own accord.     Review of Systems   Constitutional: Negative.    HENT: Negative.    Eyes: Negative.    Respiratory: Negative.    Cardiovascular: Negative.    Gastrointestinal: Negative.    Endocrine: Negative.    Genitourinary: Negative.    Musculoskeletal: Negative.    Skin: Negative.    Allergic/Immunologic: Negative.    Neurological: Negative.    Hematological: Negative.    Psychiatric/Behavioral: Negative.    All other systems reviewed and are negative.      /66 (BP Location: Right arm, Patient Position: Sitting, Cuff Size: Adult)   Pulse 62   Ht  "182.9 cm (72\")   Wt 76.2 kg (168 lb)   SpO2 96%   BMI 22.78 kg/m²   Physical Exam  Vitals and nursing note reviewed.   Constitutional:       Appearance: Normal appearance. He is well-developed and normal weight.   HENT:      Head: Normocephalic and atraumatic.   Eyes:      General: No scleral icterus.     Pupils: Pupils are equal, round, and reactive to light.   Neck:      Thyroid: No thyromegaly.      Vascular: Carotid bruit (right) present. No JVD.      Comments: Right neck incision healed  Cardiovascular:      Rate and Rhythm: Normal rate and regular rhythm.      Pulses:           Carotid pulses are 2+ on the right side and 2+ on the left side.       Femoral pulses are 2+ on the right side and 2+ on the left side.       Popliteal pulses are 2+ on the right side and 2+ on the left side.      Heart sounds: Normal heart sounds.   Pulmonary:      Effort: Pulmonary effort is normal.      Breath sounds: Normal breath sounds.   Abdominal:      General: Bowel sounds are normal. There is no distension or abdominal bruit.      Palpations: Abdomen is soft. There is no mass.      Tenderness: There is no abdominal tenderness.   Musculoskeletal:         General: Normal range of motion.      Cervical back: Normal range of motion and neck supple.   Lymphadenopathy:      Cervical: No cervical adenopathy.   Skin:     General: Skin is warm and dry.   Neurological:      General: No focal deficit present.      Mental Status: He is alert and oriented to person, place, and time.      Cranial Nerves: No cranial nerve deficit.      Sensory: No sensory deficit.   Psychiatric:         Mood and Affect: Mood normal.         Behavior: Behavior normal.         Thought Content: Thought content normal.         Judgment: Judgment normal.         XR chest 2 vw    Result Date: 5/7/2021  Narrative:  XR CHEST 2 VW- 5/7/2021 11:59 AM CDT  HISTORY: preop-RIGHT CAROTID ENDARTERECTOMY WITH EEG; I65.21-Occlusion and stenosis of right carotid artery; " Z01.818-Encounter for other preprocedural examination   COMPARISON: September 15, 2020.  FINDINGS: Upright frontal and lateral radiographs of the chest were obtained.  The lungs are clear. Cardiac silhouette is normal. Wires are present previous median sternotomy and CABG.. The osseous structures and surrounding soft tissues demonstrate no acute abnormality.      Impression: 1. No radiographic evidence of acute cardiopulmonary process.   This report was finalized on 05/07/2021 12:08 by Dr. Donald Rhodes MD.     Carotid Limited Intraop    Result Date: 5/10/2021  Narrative: History: Right carotid stenosis.  Comments: Grayscale imaging as well as color flow duplex were used to evaluate the right carotid system intraoperatively during carotid endarterectomy.  The peak systolic velocity in the common carotid artery measured 133 cm/s. In the internal carotid artery measured 164 cm/s. In the external carotid artery measured 180 cm/s. There is no evidence of flap formation, debris, or thrombosis.      Impression: Successful right carotid endarterectomy.  This report was finalized on 05/10/2021 15:28 by Dr. Gil Pineda MD.       Impression      The patient has moderately diminished ankle-brachial indices bilateral    lower extremity(ies) which would be consistent with claudication level    symptoms.    Based on segmental pressures and doppler waveforms, the patient likely has    disease in the bilateral superficial femoral and tibial arterial segments.      Signature      ----------------------------------------------------------------    Electronically signed by Wellington Palma MD(Interpreting    physician) on 01/28/2020 03:18 PM    ----------------------------------------------------------------     Velocities are measured in cm/s ; Diameters are measured in mm     Pressures   +--------------------------------------++--------+-----+----+--------+-----+   !                                      !!Right   !     !Left!         !     !   +--------------------------------------++--------+-----+----+--------+-----+   !Location                              !!Pressure!Ratio!    !Pressure!Ratio!   +--------------------------------------++--------+-----+----+--------+-----+   !Upper Thigh                           !!144     !1    !    !157     !1.09 !   +--------------------------------------++--------+-----+----+--------+-----+   !Lower Thigh                           !!163     !1.13 !    !111     !0.77 !   +--------------------------------------++--------+-----+----+--------+-----+   !Calf                                  !!119     !0.83 !    !83      !0.58 !   +--------------------------------------++--------+-----+----+--------+-----+   !Ankle PT                              !!105     !0.73 !    !84      !0.58 !   +--------------------------------------++--------+-----+----+--------+-----+   !DP                                    !!101     !0.7  !    !86      !0.6  !   +--------------------------------------++--------+-----+----+--------+-----+   !Great Toe                             !!76      !0.53 !    !46      !0.32 !   +--------------------------------------++--------+-----+----+--------+-----+       - Brachial Pressure:Right: 144.Left:142.       - SAGRARIO:Right: 0.73.Left: 0.6.      Patient Active Problem List   Diagnosis   • Chronic rhinitis   • Acute renal failure superimposed on stage 3 chronic kidney disease (CMS/HCC)   • Hyponatremia   • Acute cystitis   • Metabolic acidosis, increased anion gap   • Hypomagnesemia   • Hypokalemia   • Moderate malnutrition (CMS/[facility])   • Hypertension, benign   • Enterocolitis   • Chronic diarrhea   • UTI (urinary tract infection), bacterial   • Crohn's disease of large intestine with other complication (CMS/[facility])   • Asymmetrical hearing loss of left ear   • Anemia, chronic disease   • CKD (chronic kidney disease) stage 4, GFR 15-29 ml/min (CMS/[facility])   • Bruit of right carotid artery   • Wellness  examination   • Pre-op evaluation   • PAD (peripheral artery disease) (CMS/[facility])   • IHD (ischemic heart disease)   • Carotid stenosis   • Stenosis of right carotid artery   • Preop testing   • Carotid stenosis, right   • Avascular necrosis of bone of hip (CMS/[facility])   • Diastolic dysfunction   • Shortness of breath         ICD-10-CM ICD-9-CM   1. Bilateral carotid artery stenosis  I65.23 433.10     433.30   2. PAD (peripheral artery disease) (CMS/[facility])  I73.9 443.9   3. Essential hypertension  I10 401.9   4. Chronic kidney disease, unspecified CKD stage  N18.9 585.9       Plan: After thoroughly evaluating Ricardo Hugo, I and please report he is doing well status post right carotid endarterectomy.  His neck incision has healed.  He remains neurologically intact.  Of note he did stop taking his Xarelto due to nosebleeding.  Given his fresh surgery I would like him to begin on Plavix at least short-term.  I will send this into his pharmacy.  As far as his PAD is concerned, he has not seen his kidney doctor yet will be seeing them shortly.  Last creatinine prior to his carotid surgery was 3.1 and baseline seem to be around 2.2.  At that time I did ask him to call them to be seen sooner however just states he has an appointment in July.  I would like him to see them before we proceed with any intervention to his lower extremities.  He reports pain equally to his legs.   When we schedule angiogram, he will need hydration prior to the procedure.  I will tentatively schedule an appointment for 3 months or can call  after he has been seen by nephrology.  They are going to try to get him in sooner.  I did discuss vascular risk factors as they pertain to the progression of vascular disease including controlling his hypertension, which is currently stable.  The patient can continue taking their current medication regimen as previously planned.  This was all discussed in full with complete understanding.    Thank you for  allowing me to participate in the care of your patient.  Please do not hesitate with any questions or concerns.  I will keep you aware of any further encounters with Ricardo Hugo.        Sincerely yours,         STERLING Murray

## 2021-05-27 NOTE — PROGRESS NOTES
Background:  pt w crohn's disease, pulm htn   Chief Complaint  Allergies and Shortness of Breath (per pt related to allergies), pulmonary hypertension    Subjective    History of Present Illness       Ricardo Hugo presents to [facility].  Dr. Velasco has asked me to see him for worrisome dyspnea. He had an echocardiogram about 10 months ago suggesting pulmonary hypertension.  He gets seasonal allergies associated with wheezy shortness of breath alleviated by albuterol seasonally in the spring time.. He says Memorial day is the worst. He does not think dyspnea is any worse than it was last year.       has a past medical history of 3-vessel CAD (8/11/2020), Allergic rhinitis, Anxiety disorder (4/27/2020), Arthritis, Asymmetrical sensorineural hearing loss (6/28/2017), Atherosclerosis of native artery of both lower extremities with intermittent claudication (CMS/[facility]) (7/18/2019), Avascular necrosis of femoral head, left (CMS/[facility]) (7/11/2020), Carotid stenosis, Chronic mucoid otitis media, Chronic rhinitis, Coronary artery disease, Crohn's disease of large intestine with other complication (CMS/[facility]) (7/30/2020), Displacement of lumbar intervertebral disc without myelopathy (08/11/2020), ED (erectile dysfunction) of organic origin (8/11/2020), Eustachian tube dysfunction, GERD (gastroesophageal reflux disease), Heart disease, Hyperlipidemia (8/11/2020), Hypertension, Hypertension, benign (8/11/2020), 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   has a past surgical history that includes Total hip arthroplasty (Right, 2006); Tonsillectomy; Incision and drainage perirectal abscess (N/A, 3/3/2017); Myringotomy w/ tubes (Left, 04/17/2017); Colonoscopy (N/A, 7/2/2020); Colonoscopy (N/A, 10/13/2020); Eye surgery (Bilateral); Carotid Endarterectomy (Right, 5/10/2021); Coronary artery bypass graft (2003); and Artery surgery (2021).  family history includes No Known Problems in his father and mother.   reports that he quit smoking about 7 years ago. His smoking use included cigarettes. He started smoking about 33 years ago. He has a 12.50 pack-year smoking history. He has never used smokeless tobacco. He reports current alcohol use. He reports that he does not use drugs.  Allergies   Allergen Reactions   • Lortab [Hydrocodone-Acetaminophen] Other (See Comments) and Hallucinations     CLOSTROPHOBIC   • Allopurinol Other (See Comments)     Pain on right side       Objective     Vital Signs:   /60   Pulse 68   Ht 182.9 cm (72\")   Wt 76.2 kg (168 lb)   SpO2 98%   BMI 22.78 kg/m²   Physical Exam  Constitutional:       Appearance: Normal appearance. He is not ill-appearing or diaphoretic.   Eyes:      Extraocular Movements: Extraocular movements intact.   Cardiovascular:      Rate and Rhythm: Regular rhythm.      Comments: P2 not accentuated  Pulmonary:      Effort: Pulmonary effort is normal. No respiratory distress.      Breath sounds: No wheezing, rhonchi or rales.   Skin:     Findings: No erythema or rash.   Neurological:      Mental Status: He is alert.        Result Review  Data Reviewed:{ Labs  Result Review  Imaging  Med Tab  Media :23}   XR chest 2 vw (05/07/2021 12:04)  XR CHEST 2 VW- 5/7/2021 11:59 AM CDT  HISTORY: preop-RIGHT CAROTID ENDARTERECTOMY WITH EEG; I65.21-Occlusion  and stenosis of right carotid artery; Z01.818-Encounter for other  preprocedural examination    COMPARISON: September 15, " 2020.  FINDINGS:   Upright frontal and lateral radiographs of the chest were obtained.  The lungs are clear. Cardiac silhouette is normal. Wires are present  previous median sternotomy and CABG.. The osseous structures and  surrounding soft tissues demonstrate no acute abnormality.  IMPRESSION:  1. No radiographic evidence of acute cardiopulmonary process.  This report was finalized on 05/07/2021 12:08 by Dr. Donald Rhodes MD.  Adult Transthoracic Echo Complete W/ Cont if Necessary Per Protocol (07/14/2020 16:49)  NORMAL LV AND RV SYSTOLIC FUNCTION  GRADE 2 LV DIASTOLIC DYSFUNCTION  NO SIGNIFICANT VALVULAR DYSFUNCTION  MODERATE PULMONARY HTN               Assessment and Plan {CC Problem List  Visit Dshort ofiagnosis  ROS  Review (Popup)  Health Maintenance  Quality  BestPractice  Medications  SmartSets  SnapShot Encounters  Media :23}   Problem List Items Addressed This Visit     Crohn's disease of large intestine with other complication (CMS/HCC)    Overview            CKD (chronic kidney disease) stage 4, GFR 15-29 ml/min (CMS/HCC)      Other Visit Diagnoses     Pulmonary hypertension (CMS/HCC)    -  Primary      I have entered also diagnoses of Shortness of Breath and Diastolic Dysfunction as visit diagnoses.  [facility] inexplicably will not include those diagnoses in this list of visit diagnoses despite multiple maneuvers including refreshing the note, removing and re-adding said diagnoses to visit diagnoses and problem list in those documentation tabs and even when I add them as diagnoses in reasons for orders for imaging and pulmonary testing.      Pt has shortness of breath which could be related to pulmonary hypertension.  Will need to expand database with ordered testing.  Doubt relation of PH  to crohn's disease, which is associated with other pulmonary manifestations including pulmonary embolism and interstitial lung disease, It is not associated typically with WHO group 1 pulmonary hypertension.   If PH verified on new echo and additional testing, then consideration for group 4 CTEPH would be justified.    Will repeat echo since it has been several months since prior.  Check PFT.  Confirmation of pulmonary hypertension and WHO classification would require right heart cath.  In the meantime, will try breo 200 mcg inhaler sample.  We demonstrated proper inhaler technique.  Follow Up {Instructions Charge Capture  Follow-up Communications :23}   Return in about 3 months (around 8/27/2021).  Patient was given instructions and counseling regarding his condition or for health maintenance advice. Please see specific information pulled into the AVS if appropriate.    Electronically signed by New Omalley MD, 5/27/2021, 21:16 CDT

## 2021-05-27 NOTE — PATIENT INSTRUCTIONS
Fluticasone; Vilanterol inhalation powder  What is this medicine?  FLUTICASONE; VILANTEROL (floo TIK a sone; vye WILEY ter ol) inhalation is a combination of two medicines that decrease inflammation and help to open up the airways of your lungs. It is for chronic obstructive pulmonary disease (COPD), including chronic bronchitis or emphysema. It is also used for asthma in adults to help control symptoms. Do NOT use for an acute asthma attack or COPD attack.  This medicine may be used for other purposes; ask your health care provider or pharmacist if you have questions.  COMMON BRAND NAME(S): NIA RODRIGUEZ  What should I tell my health care provider before I take this medicine?  They need to know if you have any of these conditions:  · bone problems  · diabetes  · eye disease, vision problems  · immune system problems  · heart disease or irregular heartbeat  · high blood pressure  · infection  · pheochromocytoma  · seizures  · thyroid disease  · an unusual or allergic reaction to fluticasone, vilanterol, milk proteins, corticosteroids, other medicines, foods, dyes, or preservatives  · pregnant or trying to get pregnant  · breast-feeding  How should I use this medicine?  This medicine is inhaled through the mouth. It is used once per day. Follow the directions on the prescription label. Do not use a spacer device with this inhaler. Take your medicine at regular intervals. Do not take your medicine more often than directed. Do not stop taking except on your doctor's advice. Make sure that you are using your inhaler correctly. Ask you doctor or health care provider if you have any questions.  A special MedGuide will be given to you by the pharmacist with each prescription and refill. Be sure to read this information carefully each time.  Talk to your pediatrician regarding the use of this medicine in children. Special care may be needed. This medicine is not approved for use in children under 18 years of age.  Overdosage:  If you think you have taken too much of this medicine contact a poison control center or emergency room at once.  NOTE: This medicine is only for you. Do not share this medicine with others.  What if I miss a dose?  If you miss a dose, use it as soon as you can. If it is almost time for your next dose, use only that dose and continue with your regular schedule. Do not use double or extra doses.  What may interact with this medicine?  Do not take this medicine with any of the following medications:  · cisapride  · dofetilide  · dronedarone  · MAOIs like Carbex, Eldepryl, Marplan, Nardil, and Parnate  · pimozide  · thioridazine  · ziprasidone  This medicine may also interact with the following medications:  · antiviral medicines for HIV or AIDS  · beta-blockers like metoprolol and propranolol  · certain medicines for depression, anxiety, or psychotic disturbances  · certain medicines for fungal infections like ketoconazole, itraconazole, posaconazole, voriconazole  · conivaptan  · diuretics  · medicines for colds  · nefazodone  · other medicines for breathing problems  · other medicines that prolong the QT interval (cause an abnormal heart rhythm)  This list may not describe all possible interactions. Give your health care provider a list of all the medicines, herbs, non-prescription drugs, or dietary supplements you use. Also tell them if you smoke, drink alcohol, or use illegal drugs. Some items may interact with your medicine.  What should I watch for while using this medicine?  Visit your doctor or health care professional for regular checkups. Tell your doctor or health care professional if your symptoms do not get better. Do not use this medicine more than once every 24 hours.  NEVER use this medicine for an acute asthma or COPD attack. You should use your short-acting rescue inhalers for this purpose. If your symptoms get worse or if you need your short-acting inhalers more often, call your doctor right  away.  If you are going to have surgery tell your doctor or health care professional that you are using this medicine. Try not to come in contact with people with the chicken pox or measles. If you do, call your doctor.  This medicine may increase blood sugar. Ask your healthcare provider if changes in diet or medicines are needed if you have diabetes.  What side effects may I notice from receiving this medicine?  Side effects that you should report to your doctor or health care professional as soon as possible:  · allergic reactions like skin rash or hives, swelling of the face, lips, or tongue  · breathing problems right after inhaling your medicine  · chest pain  · fast, irregular heartbeat  · feeling faint or lightheaded, falls  · fever or chills  · nausea, vomiting  · signs and symptoms of high blood sugar such as being more thirsty or hungry or having to urinate more than normal. You may also feel very tired or have blurry vision.  Side effects that usually do not require medical attention (report to your doctor or health care professional if they continue or are bothersome):  · cough  · headache  · nervousness  · sore throat  · tremor  This list may not describe all possible side effects. Call your doctor for medical advice about side effects. You may report side effects to FDA at 2-630-FDA-0941.  Where should I keep my medicine?  Keep out of the reach of children.  Store at room temperature between 15 and 30 degrees C (59 and 86 degrees F). Store in a dry place away from direct heat or sunlight. Throw away 6 weeks after you remove the inhaler from the foil tray, or after the dose indicator reads 0, whichever comes first. Throw away any unopened packages after the expiration date.  NOTE: This sheet is a summary. It may not cover all possible information. If you have questions about this medicine, talk to your doctor, pharmacist, or health care provider.  © 2021 [employer] (2019-09-18  11:19:39)  Pulmonary Hypertension  Pulmonary hypertension is a long-term (chronic) condition in which there is high blood pressure in the arteries in the lungs (pulmonary arteries). This condition occurs when pulmonary arteries become narrow and tight, making it harder for blood to flow through the lungs. This in turn makes the heart work harder to pump blood through the lungs, making it harder for you to breathe.  Over time, pulmonary hypertension can weaken and damage the heart muscle, specifically the right side of the heart. Pulmonary hypertension is a serious condition that can be life-threatening.  What are the causes?  This condition may be caused by different medical conditions. It can be categorized by cause into five groups:  · Group 1: Pulmonary hypertension that is caused by abnormal growth of small blood vessels in the lungs (pulmonary arterial hypertension). The abnormal blood vessel growth may have no known cause, or it may be:  ? Passed from parent to child (hereditary).  ? Caused by another disease, such as a connective tissue disease (including lupus or scleroderma), congenital heart disease, liver disease, or HIV.  ? Caused by certain medicines or poisons (toxins).  · Group 2: Pulmonary hypertension that is caused by weakness of the left chamber of the heart (left ventricle) or heart valve disease.  · Group 3: Pulmonary hypertension that is caused by lung disease or low oxygen levels. Causes in this group include:  ? Emphysema or chronic obstructive pulmonary disease (COPD).  ? Untreated sleep apnea.  ? Pulmonary fibrosis.  ? Long-term exposure to high altitudes in certain people who may already be at higher risk for pulmonary hypertension.  · Group 4: Pulmonary hypertension that is caused by blood clots in the lungs (pulmonary emboli).  · Group 5: Other causes of pulmonary hypertension, such as sickle cell anemia, sarcoidosis, tumors pressing on the pulmonary arteries, and various other  diseases.  What are the signs or symptoms?  Symptoms of this condition include:  · Shortness of breath. You may notice shortness of breath with:  ? Activity, such as walking.  ? Minimal activity, such as getting dressed.  ? No activity, like when you are sitting still.  · A cough. Sometimes, bloody mucus from the lungs may be coughed up (hemoptysis).  · Tiredness and fatigue.  · Dizziness, lightheadedness, or fainting, especially with physical activity.  · Rapid heartbeat, or feeling your heart flutter or skip a beat (palpitations).  · Veins in the neck getting larger.  · Swelling of the lower legs, abdomen, or both.  · Bluish color of the lips and fingertips.  · Chest pain or tightness in the chest.  · Abdominal pain, especially in the upper abdomen.  How is this diagnosed?  This condition may be diagnosed based on one or more of the following tests:  · Chest X-ray.  · Blood tests.  · CT scan.  · Pulmonary function test. This test measures how much air your lungs can hold. It also tests how well air moves in and out of your lungs.  · 6-minute walk test. This tests how severe your condition is in relation to your activity levels.  · Electrocardiogram (ECG). This test records the electrical impulses of the heart.  · Echocardiogram. This test uses sound waves (ultrasound) to produce an image of the heart.  · Cardiac catheterization. This is a procedure in which a thin tube (catheter) is passed into the pulmonary artery and used to test the pressure in your pulmonary artery and the right side of your heart.  · Lung biopsy. This involves having a procedure to remove a small sample of lung tissue for testing. This may help determine an underlying cause of your pulmonary hypertension.  How is this treated?  There is no cure for this condition, but treatment can help to relieve symptoms and slow the progress of the condition. Treatment may include:  · Cardiac rehabilitation. This is a treatment program that includes  exercise training, education, and counseling to help you get stronger and return to an active lifestyle.  · Oxygen therapy.  · Medicines that:  ? Lower blood pressure.  ? Relax (dilate) the pulmonary blood vessels.  ? Help the heart beat more efficiently and pump more blood.  ? Help the body get rid of extra fluid (diuretics).  ? Thin the blood in order to prevent blood clots in the lungs.  · Lung surgery to relieve pressure on the heart, for severe cases that do not respond to medical treatment.  · Heart-lung transplant, or lung transplant. This may be done in very severe cases.  Follow these instructions at home:  Eating and drinking    · Eat a healthy diet that includes plenty of fresh fruits and vegetables, whole grains, and beans.  · Limit your salt (sodium) intake to less than 2,300 mg a day.  Lifestyle  · Do not use any products that contain nicotine or tobacco, such as cigarettes and e-cigarettes. If you need help quitting, ask your health care provider.  · Avoid secondhand smoke.  Activity  · Get plenty of rest.  · Exercise as directed. Talk with your health care provider about what type of exercise is safe for you.  · Avoid hot tubs and saunas.  · Avoid high altitudes.  General instructions  · Take over-the-counter and prescription medicines only as told by your health care provider. Do not change or stop medicines without checking with your health care provider.  · Stay up to date on your vaccines, especially yearly flu (influenza) and pneumonia vaccines.  · If you are a woman of child-bearing age, avoid becoming pregnant. Talk with your health care provider about birth control.  · Consider ways to get support for anxiety and stress of living with pulmonary hypertension. Talk with your health care provider about support groups and online resources.  · Use oxygen therapy at home as directed.  · Keep track of your weight. Weight gain could be a sign that your condition is getting worse.  · Keep all  follow-up visits as told by your health care provider. This is important.  Contact a health care provider if:  · Your cough gets worse.  · You have more shortness of breath than usual, or you start to have trouble doing activities that you could do before.  · You need to use medicines or oxygen more frequently or in higher dosages than usual.  Get help right away if:  · You have severe shortness of breath.  · You have chest pain or pressure.  · You cough up blood.  · You have swelling of your feet or legs that gets worse.  · You have rapid weight gain over a period of 1-2 days.  · Your medicines or oxygen do not provide relief.  Summary  · Pulmonary hypertension is a chronic condition in which there is high blood pressure in the arteries in the lungs (pulmonary arteries).  · Pulmonary hypertension is a serious condition that can be life-threatening. It can be caused by a variety of illnesses.  · Treatment may involve taking medicines and using oxygen therapy. Severe cases may require surgery or a transplant.  This information is not intended to replace advice given to you by your health care provider. Make sure you discuss any questions you have with your health care provider.  Document Revised: 11/30/2018 Document Reviewed: 03/13/2018  [employer] Patient Education © 2021 [employer]

## 2021-06-01 ENCOUNTER — READMISSION MANAGEMENT (OUTPATIENT)
Dept: CALL CENTER | Facility: HOSPITAL | Age: 71
End: 2021-06-01

## 2021-06-01 NOTE — OUTREACH NOTE
"General Surgery Week 3 Survey      Responses   [facility], [place] patient discharged from?  [place]   Does the patient have one of the following disease processes/diagnoses(primary or secondary)?  General Surgery   Week 3 attempt successful?  Yes   Call start time  1304   Call end time  1305   Discharge diagnosis  s/p right carotid endarterectomy    Meds reviewed with patient/caregiver?  Yes   Is the patient taking all medications as directed (includes completed medication regime)?  Yes   Has the patient kept scheduled appointments due by today?  Yes   What is the patient's perception of their health status since discharge?  Improving   Week 3 call completed?  Yes   Revoked  No further contact(revokes)-requires comment   Is the patient interested in additional calls from an ambulatory ?  NOTE:  applies to high risk patients requiring additional follow-up.  No   Graduated/Revoked comments  Per request   Wrap up additional comments  \"I am doing fine\" Has seen surgeon, has meds, no problems with incision.            Wendy Carroll RN  "

## 2021-06-02 ENCOUNTER — TRANSCRIBE ORDERS (OUTPATIENT)
Dept: LAB | Facility: HOSPITAL | Age: 71
End: 2021-06-02

## 2021-06-02 DIAGNOSIS — Z01.818 PREOP TESTING: Primary | ICD-10-CM

## 2021-06-04 RX ORDER — AZELASTINE 1 MG/ML
1 SPRAY, METERED NASAL 2 TIMES DAILY
Qty: 90 ML | Refills: 3 | Status: SHIPPED | OUTPATIENT
Start: 2021-06-04 | End: 2022-02-08

## 2021-06-05 ENCOUNTER — LAB (OUTPATIENT)
Dept: LAB | Facility: HOSPITAL | Age: 71
End: 2021-06-05

## 2021-06-05 LAB — SARS-COV-2 ORF1AB RESP QL NAA+PROBE: NOT DETECTED

## 2021-06-05 PROCEDURE — U0005 INFEC AGEN DETEC AMPLI PROBE: HCPCS | Performed by: INTERNAL MEDICINE

## 2021-06-05 PROCEDURE — U0004 COV-19 TEST NON-CDC HGH THRU: HCPCS | Performed by: INTERNAL MEDICINE

## 2021-06-05 PROCEDURE — C9803 HOPD COVID-19 SPEC COLLECT: HCPCS | Performed by: INTERNAL MEDICINE

## 2021-06-08 ENCOUNTER — OFFICE VISIT (OUTPATIENT)
Dept: PULMONOLOGY | Facility: CLINIC | Age: 71
End: 2021-06-08

## 2021-06-08 DIAGNOSIS — I51.89 DIASTOLIC DYSFUNCTION: ICD-10-CM

## 2021-06-08 DIAGNOSIS — R06.02 SHORTNESS OF BREATH: ICD-10-CM

## 2021-06-08 DIAGNOSIS — I27.20 PULMONARY HYPERTENSION (HCC): ICD-10-CM

## 2021-06-08 PROCEDURE — 94729 DIFFUSING CAPACITY: CPT | Performed by: INTERNAL MEDICINE

## 2021-06-08 PROCEDURE — 94010 BREATHING CAPACITY TEST: CPT | Performed by: INTERNAL MEDICINE

## 2021-06-08 PROCEDURE — 94727 GAS DIL/WSHOT DETER LNG VOL: CPT | Performed by: INTERNAL MEDICINE

## 2021-06-14 DIAGNOSIS — I73.9 PAD (PERIPHERAL ARTERY DISEASE) (HCC): ICD-10-CM

## 2021-06-14 DIAGNOSIS — I65.23 BILATERAL CAROTID ARTERY STENOSIS: ICD-10-CM

## 2021-06-14 RX ORDER — CLOPIDOGREL BISULFATE 75 MG/1
75 TABLET ORAL DAILY
Qty: 90 TABLET | Refills: 3 | Status: SHIPPED | OUTPATIENT
Start: 2021-06-14 | End: 2021-11-03 | Stop reason: HOSPADM

## 2021-06-14 RX ORDER — VALSARTAN 160 MG/1
TABLET ORAL
Qty: 90 TABLET | Refills: 3 | Status: SHIPPED | OUTPATIENT
Start: 2021-06-14 | End: 2021-07-28

## 2021-06-14 NOTE — TELEPHONE ENCOUNTER
We did not have this on our medication list but pt says he has been taking it for 4-5 years, so have pended refill.

## 2021-06-21 NOTE — PROGRESS NOTES
"[facility] GROUP HEMATOLOGY AND ONCOLOGY  [address]  [facility] [address] [phone]    Patient Name: Ricardo Hugo  Encounter Date: 06/25/2021  YOB: 1950  Patient Number: [id number]      Subjective: 71-year-old male who has a known history of chronic kidney disease who is referred by Dr. Rueda from nephrology for anemia.  The notes from [facility] show that the patient was seen on 6/3/2021 where his creatinine was noted to be 2.5 that was a little bit worse than in March 2021 but had developed worsening anemia with a hemoglobin of 7.3 and was thought to be \"very deficient in iron\".      The patient was also seen by Dr. Omalley from pulmonary medicine when the patient's PCP, Dr. Velasco, had asked pulmonary to see the patient for worrisome dyspnea.  It was noted that 10 months prior an echocardiogram that shown suggestion of pulmonary hypertension.  At that time the patient did not believe that his dyspnea was worse than it was a year previously.  Upon review of the patient's previous medical problems, there is a cornucopia of medical illnesses with the ones that could possibly be related to anemia including but not limited to the following: Three-vessel CAD, arthritis, Crohn's disease (\"slight case\" according to what he was told) with chronic diarrhea, iron deficiency anemia, personal history of alcoholism (and he states that he quit drinking in 2013).    Denies noted bleeding anywhere (only when he was on a blood thinner when he had some nosebleeds -- changed the blood thinner and the bleeding stopped).  He was only recently diagnosed with iron deficienc.  He does  eat meat on a regular basis.  Denies  signs of pica syndrome.    He had previously seen Dr. Hernandez 10 years ago but he doesn't recall why and does not knwo the resulst of the marrow (the office doesn't keep records for that long).    Past Medical History:   Diagnosis Date "   • 3-vessel CAD 8/11/2020   • Allergic rhinitis    • Anxiety disorder 4/27/2020   • Arthritis    • Asymmetrical sensorineural hearing loss 6/28/2017   • Atherosclerosis of native artery of both lower extremities with intermittent claudication (CMS/[facility]) 7/18/2019   • Avascular necrosis of femoral head, left (CMS/[facility]) 07/11/2020    right hip after surgery   • Carotid stenosis    • Chronic mucoid otitis media    • Chronic rhinitis    • Coronary artery disease     HEART BYPASS 2004   • Crohn's disease of large intestine with other complication (CMS/[facility]) 7/30/2020    Chronic diarrhea Colonoscopy July 2020 revealed mild patchy scattered hemosiderin staining with inflammation more so in rectosigmoid area.  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   • Iron deficiency anemia 7/14/2020   • Mixed hearing loss of left ear    • PAD (peripheral artery disease) (CMS/[facility]) 8/11/2020   • Perianal abscess    • Pernicious anemia 08/17/2020    took shots but never diagnosed with b12 deficiency   • Personal history of alcoholism (CMS/[facility]) 08/11/2020    quit drinking in 2013   • Prostatic hypertrophy 8/11/2020   • Sensorineural hearing loss    • Sepsis with acute renal failure (CMS/[facility]) 9/15/2020   • Shortness of breath 5/27/2021   • Tinnitus    • Ventricular tachycardia, nonsustained (CMS/[facility]) 7/14/2020   • Weight loss 7/11/2020       Oncology History:  Oncology/Hematology History    No history exists.       Past Surgical History:  Past Surgical History:   Procedure Laterality Date   • ARTERY SURGERY  2021    right side on neck   • CAROTID ENDARTERECTOMY  Right 5/10/2021    Procedure: RIGHT CAROTID ENDARTERECTOMY WITH EEG;  Surgeon: Gil Pineda DO;  Location: [facility] HYBRID OR 12;  Service: Vascular;  Laterality: Right;   • COLONOSCOPY N/A 7/2/2020    Procedure: COLONOSCOPY WITH ANESTHESIA;  Surgeon: Adrien Brewster MD;  Location: [facility] ENDOSCOPY;  Service: Gastroenterology;  Laterality: N/A;  pre op: diarrhea  post op: polyps  PCP: Joe Velasco MD   • COLONOSCOPY N/A 10/13/2020    Procedure: COLONOSCOPY WITH ANESTHESIA;  Surgeon: Adrien Brewster MD;  Location: [facility] ENDOSCOPY;  Service: Gastroenterology;  Laterality: N/A;  Pre: Chronic Diarrhea, Crohn's  Post: AVM  Dr. Neftali Velasco  CO2 Inflation Used   • CORONARY ARTERY BYPASS GRAFT  2003    x3   • EYE SURGERY Bilateral     catorac   • INCISION AND DRAINAGE PERIRECTAL ABSCESS N/A 3/3/2017    Procedure: INCISION AND DRAINAGE OF JEET ANAL ABSCESS;  Surgeon: Lynette Smith MD;  Location: [facility] OR;  Service:    • MYRINGOTOMY W/ TUBES Left 04/17/2017    06/10/2016   • TONSILLECTOMY     • TOTAL HIP ARTHROPLASTY Right 2006      ___________________________________________________________________________________________________________________________________________________  Medications:   Outpatient Encounter Medications as of 6/25/2021   Medication Sig Dispense Refill   • albuterol sulfate  (90 Base) MCG/ACT inhaler USE 2 INHALATIONS FOUR TIMES A DAY AS NEEDED 20.1 g 3   • amLODIPine (NORVASC) 10 MG tablet Take 10 mg by mouth Daily.     • aspirin (ASPIR) 81 MG EC tablet Take 81 mg by mouth Daily.     • Aspirin-Acetaminophen-Caffeine (EXCEDRIN EXTRA STRENGTH PO) Take 2 tablets by mouth Daily As Needed (HEADACHES).     • atorvastatin (LIPITOR) 10 MG tablet Take 1 tablet by mouth Daily. 90 tablet 3   • azelastine (ASTELIN) 0.1 % nasal spray 1 spray into the nostril(s) as directed by provider 2 (Two) Times a Day. Use in each nostril as directed 90 mL 3   • cholestyramine (QUESTRAN) 4 g packet Take  1 packet by mouth Daily. 90 packet 3   • cloNIDine (CATAPRES) 0.2 MG tablet TAKE 3 TABLETS DAILY 270 tablet 3   • clopidogrel (Plavix) 75 MG tablet Take 1 tablet by mouth Daily. 90 tablet 3   • desoximetasone (TOPICORT) 0.25 % cream APPLY TO THE AFFECTED AREA TWICE A DAY AS NEEDED 30 g 3   • diphenhydrAMINE (Benadryl Allergy) 25 mg capsule Take 25 mg by mouth Every 6 (Six) Hours As Needed for Itching.     • fexofenadine (ALLEGRA) 180 MG tablet Take 180 mg by mouth Daily.     • fluticasone (FLONASE) 50 MCG/ACT nasal spray 2 sprays into the nostril(s) as directed by provider Daily As Needed for Rhinitis.     • levothyroxine (Synthroid) 75 MCG tablet Take 1 tablet by mouth Daily. 90 tablet 3   • magnesium chloride ER 64 MG DR tablet Take 143 mg by mouth Daily.     • Melatonin 10 MG capsule Take 2 capsules by mouth Every Night.     • mesalamine (APRISO) 0.375 g 24 hr capsule Take 4 capsules by mouth Daily. 360 capsule 3   • metoprolol succinate XL (TOPROL-XL) 25 MG 24 hr tablet Take 1 tablet by mouth Daily. 90 tablet 3   • Multiple Vitamins-Minerals (PRESERVISION/LUTEIN) capsule Take 1 capsule by mouth 2 (two) times a day.     • omeprazole (priLOSEC) 20 MG capsule Take 1 capsule by mouth Daily. 90 capsule 3   • potassium chloride 10 MEQ CR tablet Take 1 tablet by mouth 2 (Two) Times a Day. 180 tablet 3   • sodium bicarbonate 650 MG tablet Take 1 tablet by mouth 2 (Two) Times a Day. 180 tablet 3   • valsartan (DIOVAN) 160 MG tablet TAKE 1 TABLET DAILY 90 tablet 3   • vitamin D (ERGOCALCIFEROL) 1.25 MG (47479 UT) capsule capsule Take 50,000 Units by mouth 1 (One) Time Per Week.     • Fluticasone Furoate-Vilanterol (Breo Ellipta) 200-25 MCG/INH inhaler Inhale 1 puff Daily for 14 days. 1 each 0     No facility-administered encounter medications on file as of 6/25/2021.      ___________________________________________________________________________________________________________________________________________________  Allergies:   Allergies   Allergen Reactions   • Lortab [Hydrocodone-Acetaminophen] Other (See Comments) and Hallucinations     CLOSTROPHOBIC   • Allopurinol Other (See Comments)     Pain on right side       Social/Family History:   Family History   Problem Relation Age of Onset   • No Known Problems Mother    • No Known Problems Father    • No Known Problems Daughter    • Colon cancer Neg Hx    • Colon polyps Neg Hx         /Single/:      Social History     Tobacco Use   • Smoking status: Former Smoker     Packs/day: 0.50     Years: 25.00     Pack years: 12.50     Types: Cigarettes     Start date:      Quit date: 10/13/2013     Years since quittin.7   • Smokeless tobacco: Never Used   • Tobacco comment: quit    Vaping Use   • Vaping Use: Never used   Substance Use Topics   • Alcohol use: Yes     Alcohol/week: 5.0 standard drinks     Types: 5 Cans of beer per week     Comment: rare- very little, 5 beers a week but previously was an alcoholic when he was drinking 10 beers a day and was also drinking vodka   • Drug use: No        Occupation: Retired equipment rental, 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  Negative for activity change, appetite change, chills, fatigue, fever, unexpected weight gain and unexpected weight loss.   HENT: Positive for dental problem and hearing loss. Negative for congestion, ear pain, mouth sores, nosebleeds, sore throat, swollen glands and trouble swallowing.         Dentures   Eyes: Negative.  Negative for blurred vision, double vision, photophobia and pain.   Respiratory: Positive for shortness of breath.  "Negative for apnea, cough, chest tightness and wheezing.    Cardiovascular: Positive for leg swelling. Negative for chest pain and palpitations.        Left foot will occasionally swell which resolves with a \"water pill\"   Gastrointestinal: Negative.  Negative for abdominal distention, abdominal pain, anal bleeding, blood in stool, constipation, diarrhea, nausea, vomiting and GERD.   Endocrine: Negative for cold intolerance and heat intolerance.   Genitourinary: Negative.  Negative for breast discharge, dysuria, frequency and hematuria.   Musculoskeletal: Positive for arthralgias. Negative for back pain, gait problem, myalgias and neck stiffness.   Skin: Positive for bruise. Negative for color change, pallor, rash and skin lesions.   Allergic/Immunologic: Positive for environmental allergies. Negative for immunocompromised state.   Neurological: Negative.  Negative for dizziness, syncope, weakness and light-headedness.   Hematological: Negative for adenopathy. Bruises/bleeds easily.   Psychiatric/Behavioral: Negative.  Negative for suicidal ideas and depressed mood. The patient is not nervous/anxious.      ___________________________________________________________________________________________________________________________________________________  Physical Examination:   Vitals:    06/25/21 1033   BP: 148/68   Pulse: 76   Resp: 16   Temp: 98.6 °F (37 °C)   SpO2: 99%    Body surface area is 2.01 meters squared.   Physical Exam  Constitutional:       Appearance: Normal appearance. He is well-developed, well-groomed and normal weight.   HENT:      Head: Normocephalic and atraumatic.      Nose: Nose normal.      Mouth/Throat:      Mouth: Mucous membranes are dry.   Eyes:      Pupils: Pupils are equal, round, and reactive to light.      Comments: + conjunctival palor   Cardiovascular:      Rate and Rhythm: Normal rate and regular rhythm.      Pulses: Normal pulses.      Heart sounds: Murmur heard.   Systolic murmur " "is present with a grade of 3/6.     Pulmonary:      Effort: Pulmonary effort is normal.      Breath sounds: Normal breath sounds and air entry.   Chest:      Comments: Bilateral mild gynecomastia  Abdominal:      General: Abdomen is flat. Bowel sounds are normal.      Palpations: Abdomen is soft. There is no hepatomegaly or splenomegaly.      Comments: Minimal \"frog belly\" with no palpable HSM   Musculoskeletal:         General: Normal range of motion.      Cervical back: Neck supple.      Right lower leg: Normal. No edema.      Left lower leg: Normal. No edema.   Lymphadenopathy:      Head:      Right side of head: No submental, submandibular or occipital adenopathy.      Left side of head: No submental, submandibular or occipital adenopathy.      Cervical:      Right cervical: No superficial cervical adenopathy.     Left cervical: No superficial cervical adenopathy.      Upper Body:      Right upper body: No supraclavicular or axillary adenopathy.      Left upper body: No supraclavicular or axillary adenopathy.      Lower Body: No right inguinal adenopathy. No left inguinal adenopathy.   Skin:     General: Skin is warm and dry.      Comments: Frail skin with some noted ecchymoses on the UE's bilaterally   Neurological:      General: No focal deficit present.      Mental Status: He is alert and oriented to person, place, and time.   Psychiatric:         Attention and Perception: Attention and perception normal.         Mood and Affect: Mood normal.         Speech: Speech normal.         Behavior: Behavior normal.         Thought Content: Thought content normal.         Cognition and Memory: Cognition and memory normal.         Judgment: Judgment normal.       ___________________________________________________________________________________________________________________________________________________  Labs/X-ray results:     Lab Results - Last 18 Months   Lab Units 05/10/21  1055 05/07/21  1213 03/11/21  0951 " 09/21/20  0338 09/20/20  0431 09/19/20  0427 09/18/20  0032 08/11/20  0936 07/22/20  1526 07/11/20  1114 04/28/20  1232   HEMOGLOBIN g/dL 8.3* 7.6* 9.7* 8.5* 8.6* 8.5* 7.0*   < > 9.9*  --  13.1   HEMATOCRIT % 26.7* 24.0* 30.1* 26.3* 26.2* 25.6* 20.6*   < > 31.4*  --  37.4*   MCV fL 97.1* 97.2* 99.0* 94.6 92.3 91.1 90.7   < > 104.0*  --  97.9*   WBC 10*3/mm3 5.40 6.57 5.06 9.68 11.58* 13.76* 6.97   < > 10.84*  --  11.64*   RDW % 13.5 13.7 12.6 15.8* 16.3* 15.9* 13.6   < > 13.2  --  13.0   MPV fL 10.5 10.4  --  11.1 11.1 10.6 10.4   < >  --   --   --    PLATELETS 10*3/mm3 113* 119* 103* 126* 114* 116* 100*   < > 263  --  151   IMM GRAN % % 0.4 0.5  --  2.0* 0.9* 0.9* 0.4   < >  --   --   --    NEUTROS ABS 10*3/mm3 3.49 4.54 3.04 6.90 8.91* 12.11* 5.24   < > 7.71*   < > 10.45*   LYMPHS ABS 10*3/mm3 1.15 1.21 1.28 1.29 1.12 0.55* 0.87   < > CANCELED  1.34  --  CANCELED  0.00*   MONOS ABS 10*3/mm3 0.58 0.62 0.52 0.73 0.82 0.68 0.61   < > 1.34*   < > 0.95*   EOS ABS 10*3/mm3 0.11 0.13 0.16 0.53* 0.58* 0.24 0.20   < > CANCELED  --  CANCELED   EOSINOPHIL ABS 10*3/mm3  --   --   --   --   --   --   --   --  0.44*  --  0.12   EOSINOPHIL % %  --   --   --   --   --   --   --   --  4.1  --  1.0   BASOS ABS 10*3/mm3 0.05 0.04 0.05 0.04 0.04 0.06 0.02   < > CANCELED  --  CANCELED   IMMATURE GRANS (ABS) 10*3/mm3 0.02 0.03  --  0.19* 0.11* 0.12* 0.03   < >  --   --   --    NRBC /100 WBC 0.0 0.0 0.0 0.0 0.0 0.0 0.0   < > 0.0   < >  --    NEUTROPHIL % %  --   --   --   --   --   --   --   --  71.1  --  89.8*   MONOCYTES % %  --   --   --   --   --   --   --   --  12.4*  --  8.2    < > = values in this interval not displayed.       Lab Results - Last 18 Months   Lab Units 05/07/21  1213 04/12/21  0856 03/11/21  0951 09/22/20  0346 09/21/20  0338 09/19/20  0427 09/18/20  0032 09/17/20  0326 09/16/20  0245 09/15/20  1934   GLUCOSE mg/dL 109*  --   --  103* 102* 96 116* 132* 129* 104*   SODIUM mmol/L 139  --  139 135* 133* 138 136 135*  135* 133*   POTASSIUM mmol/L 5.0  --  4.4 5.7* 5.2 3.9 3.0* 2.8* 3.1* 3.2*   TOTAL CO2 mmol/L  --   --  20.5*  --   --   --   --   --   --   --    CO2 mmol/L 18.0*  --   --  20.0* 18.0* 20.0* 24.0 23.0 18.0* 13.0*   CHLORIDE mmol/L 110*  --  107 104 105 107 101 101 103 103   ANION GAP mmol/L 11.0  --   --  11.0 10.0 11.0 11.0 11.0 14.0 17.0*   CREATININE mg/dL 2.30* 3.10* 2.22* 2.52*  2.52* 2.26* 2.60* 2.61* 2.87* 2.99* 2.92*   BUN mg/dL 40*  --  40* 37* 28* 28* 34* 34* 42* 40*   BUN / CREAT RATIO  17.4  --  18.0 14.7 12.4 10.8 13.0 11.8 14.0 13.7   CALCIUM mg/dL 8.9  --  8.5* 9.0 8.7 8.8 8.0* 8.0* 8.9 9.4   EGFR IF NONAFRICN AM mL/min/1.73 28*  --  29* 25*  25* 29* 24* 24* 22* 21* 21*   ALK PHOS U/L  --   --  218*  --  205* 167* 122* 127* 172* 197*   TOTAL PROTEIN g/dL  --   --   --   --  6.3 6.3 5.9* 6.0 7.6 8.5   ALT (SGPT) U/L  --   --  16  --  9 9 9 11 16 19   AST (SGOT) U/L  --   --  19  --  18 14 12 11 17 18   BILIRUBIN mg/dL  --   --  0.3  --  0.3 0.4 <0.2 0.2 0.4 0.5   ALBUMIN g/dL  --   --  3.90  --  2.70* 2.80* 2.50* 2.70* 3.50 3.60   GLOBULIN gm/dL  --   --   --   --  3.6 3.5 3.4 3.3 4.1 4.9       Lab Results - Last 18 Months   Lab Units 03/11/21  0951 09/15/20  1934 07/11/20  1114 07/02/20  0901   URIC ACID mg/dL 8.7*  --  9.9*  --    LDH U/L  --  166  --   --    REFERENCE LAB REPORT   --   --   --  See Attached Report       Lab Results - Last 18 Months   Lab Units 06/21/21  1206 05/05/21  1014 03/11/21  0951 09/16/20  0941 07/22/20  1526 07/13/20  0538 07/11/20  1114 06/04/20  1017   IRON mcg/dL  --   --   --  11*  --  32*  --   --    TIBC mcg/dL  --   --   --  183* 281 173*  --   --    IRON SATURATION %  --   --   --  6* 22 19*  --   --    TSH uIU/mL 4.170 6.550* 8.010*  --   --   --  4.780* 3.700   FOLATE ng/mL  --   --   --   --  7.14  --   --   --      ____________________________________________________________________________  Radiology: Pulmonary Function Test    Result Date:  2021  Narrative: New Omalley MD     2021  5:18 PM Pulmonary Function Test Performed by: Kailey Koenig, RRT Authorized by: New Omalley MD  Pre Drug % Predicted  FVC: 71%  FEV1: 60%  FEF 25-75%: 32%  FEV1/FVC: 65.82%  T%  RV: 140%  DLCO: 36%  D/VAsb: 44% Interpretation Spirometry Spirometry shows moderate obstruction. There is reduced midflow suggesting small airway/airflow obstruction. Review of FVL curve Patient's effort is normal. Lung Volume Measurements Measurements show elevated residual volume consistent with gas trapping. Diffusion Capacity The patient's diffusion capacity is severely reduced.  Diffusion capacity is moderately reduced when corrected for alveolar volume.              ___________________________________________________________________________________________________________________________________________________  Assessment/Plans:   Date  3/2/2017 2020  2020 9/15/20 3.11.21  2021      WBC  9.04  11.64  19.49 21.52 5.06  5.40      Hb  13.6  13.1  11.1 11.1 9.7  8.3      MCV  95.4  97.9  98.3  91.5 99.0  97.1      Plt  122  151  291  190 103  113      ANC  6.43   16.51  17.19   3.49      ALC  1.31   1.45  1.94   1.15      AMC  0.97  0.91  2.23 (!!!)    0.58      AEC  0.27  0.02  0  0.11      ABC  0.03  0.07  0.07  0.05      Ferritin            Iron            Iron SAT            Transferrin            TIBC            Hapto            LDH            NOÉ            Mahesh            Zinc            Retic            B12            Folate            Hep C    NEG        Hepatitis B    NEG        HIV            Creatinine  0.79  1.30 4.38  2.92        Glucose  103  125 179  104        ALT  19  73  48 19        AST  28  63 30 18        Alk phos  120  114  228 197        EGFR 97  54 13 (!!!) 21        CRP    23.95         Uric acid      8.7       TSH      8.010       PT/PTT      15.8/36          ** Anemia  ** Renal Insufficiency   -As you may well know,  "chronic renal insufficiency can lead to severe anemia  - As in all anemias, it may be prudent to check:   o reticulocyte counts  o folate  o B12 level  o Haptoglobin  o LDH  o Peripheral smear  o NOÉ  o Ferritin  o Iron profile  o Copper  o Zinc  o SPEP  o UPEP  o Soluble transferrin receptor (should be normal and anemia of chronic disease)  o EPO level (should be low and anemia of chronic disease)  o TFT's   •  Anemia should  be treated with transfusions based on symptoms or usually a hb <7 (with some exceptions).  •  ABILIO replacement requires ferritin >100 and iron saturation >20% for Hb <10      -In the history of a patient with Crohn's disease, it is likely that iron will be low due to chronic blood loss  -Given this patient's age, the level of anemia and the added macrocytosis with monocytosis on and off, would recommend to proceed with a bone marrow biopsy to rule out any infiltrative disorders and he agrees (he had it done 10 years ago at [facility] with Dr. Hernandez but he is not sure why he had it done and says he had \"something\" but not sure what and those records are not available after so many years).  Ordered on 6.2521  - if patient is symptomatically anemic or Hb is <7.0 can receive transfusions  - patient took b12 injections in the past but states that he was never officially diagnosed with b12 deficiency    ** Infection status:   · Covid vaccination status MODERNA with the second dose in 4/2021  **Metabolic / Renal:   · Chronic renal failure with latest EGFR of 21.  This places the patient at a grade 4 renal failure.  This can most certainly be a causative agent in anemia    ** Endocrine:   · hypothyroidism being treated by PCP and can certainly affect anemia  ** Coagulation / VTE prophylaxis:   - no current issues  ** GI:   -IBD SGI diagnostic consistent with Crohn's disease with chronic diarrhea can lead to anemia  -History of chronic alcoholism can most certainly be an explanation for " thrombocytopenia due to hepatosplenomegaly and hypersplenism.  For better evaluation would proceed with a ultrasound of the abdomen  -Noted to have an elevated alkaline phosphatase and sometimes elevated ALT and AST.  May be related to either alcoholism and or Crohn's disease and should be followed up by GI and PCP.  ** Pulmonary:   · Current smoking status former smoker, quit in 2013  -History of pulmonary hypertension being followed by pulmonary Dr. Omalley  ** Cardiology:   -Ventricular tachycardia, CAD, atherosclerosis with intermittent claudication, carotid stenosis, heart disease, hyperlipidemia, hypertension all to be followed up by PCP/cardiology as needed  ** Skin / Rash:   · ecchymoses that may be due to age or blood thinner or both  ** :   · No current issues   ** Rheumatology/Musculoskeletal  -History of avascular necrosis of the femoral head after surgery on the hip  - arthritis can also lead to anemia  ** Neurologic:   · Hearing loss and tinnitus to be followed up by PCP  ** Psychosocial:   · No current issues   ** Pain control:   - no current issue  ** Health Maintenance  -  Last colonoscopy 7/2/2020: 1.  Large intestine, cecum, biopsy: A.  Fragments of benign colonic mucosa demonstrating minimal active inflammation, nonspecific. B.  No glandular dysplasia identified. 2.  Large intestine, transverse colon, biopsy:  A.  Fragments of benign colonic mucosa demonstrating mild active inflammation, nonspecific. B.  No glandular dysplasia identified.  3.  Large intestine, distal transverse colon polyps x2, polypectomy: Fragments of adenomatous polyps.   4.  Large intestine, colon at 35 cm, biopsy: A.  Fragments of benign colonic mucosa demonstrating mild active inflammation, nonspecific. B.  No glandular dysplasia identified. 5.  Large intestine, hepatic flexure, polypectomy: Fragments of adenomatous polyp. 6.  Large intestine, proximal transverse colon, polypectomy: Polypoid fragment of benign colonic  mucosa.  Comment: Histologic changes of an adenomatous polyp or hyperplastic polyp are not identified.   7.  Large intestine, rectum, biopsy: A.  Fragments of benign, large intestinal mucosa demonstrating minimal active inflammation, nonspecific. B.  No glandular dysplasia identified.    Advance Care Planning   ACP discussion was held with the patient during this visit. Patient does not have an advance directive, information provided.FULL CODE and HCP is alex Hugo     - Follow up on 2 weeks after the bone marrow biopsy    Amit Goldberg, MD    Total time spent caring for patient, reviewing lab and radiology information, and explaining the plan of care to the patient was 60 minutes min

## 2021-06-22 ENCOUNTER — TELEPHONE (OUTPATIENT)
Dept: INTERNAL MEDICINE | Facility: CLINIC | Age: 71
End: 2021-06-22

## 2021-06-25 ENCOUNTER — LAB (OUTPATIENT)
Dept: LAB | Facility: HOSPITAL | Age: 71
End: 2021-06-25

## 2021-06-25 ENCOUNTER — INFUSION (OUTPATIENT)
Dept: ONCOLOGY | Facility: HOSPITAL | Age: 71
End: 2021-06-25

## 2021-06-25 ENCOUNTER — CONSULT (OUTPATIENT)
Dept: ONCOLOGY | Facility: CLINIC | Age: 71
End: 2021-06-25

## 2021-06-25 ENCOUNTER — TELEPHONE (OUTPATIENT)
Dept: ONCOLOGY | Facility: CLINIC | Age: 71
End: 2021-06-25

## 2021-06-25 VITALS
WEIGHT: 174.5 LBS | HEART RATE: 76 BPM | DIASTOLIC BLOOD PRESSURE: 68 MMHG | BODY MASS INDEX: 23.63 KG/M2 | RESPIRATION RATE: 16 BRPM | HEIGHT: 72 IN | TEMPERATURE: 98.6 F | SYSTOLIC BLOOD PRESSURE: 148 MMHG | OXYGEN SATURATION: 99 %

## 2021-06-25 VITALS
OXYGEN SATURATION: 100 % | TEMPERATURE: 97.4 F | HEIGHT: 72 IN | HEART RATE: 74 BPM | DIASTOLIC BLOOD PRESSURE: 80 MMHG | RESPIRATION RATE: 16 BRPM | SYSTOLIC BLOOD PRESSURE: 200 MMHG | WEIGHT: 174 LBS | BODY MASS INDEX: 23.57 KG/M2

## 2021-06-25 DIAGNOSIS — N18.4 CRD (CHRONIC RENAL DISEASE), STAGE IV (HCC): ICD-10-CM

## 2021-06-25 DIAGNOSIS — F10.21 HISTORY OF ALCOHOLISM (HCC): ICD-10-CM

## 2021-06-25 DIAGNOSIS — D63.1 ANEMIA DUE TO STAGE 4 CHRONIC KIDNEY DISEASE (HCC): ICD-10-CM

## 2021-06-25 DIAGNOSIS — N18.4 CKD (CHRONIC KIDNEY DISEASE) STAGE 4, GFR 15-29 ML/MIN (HCC): ICD-10-CM

## 2021-06-25 DIAGNOSIS — D69.6 THROMBOCYTOPENIA (HCC): ICD-10-CM

## 2021-06-25 DIAGNOSIS — N18.4 CRD (CHRONIC RENAL DISEASE), STAGE IV (HCC): Primary | ICD-10-CM

## 2021-06-25 DIAGNOSIS — N18.4 ANEMIA DUE TO STAGE 4 CHRONIC KIDNEY DISEASE (HCC): ICD-10-CM

## 2021-06-25 DIAGNOSIS — E03.9 HYPOTHYROIDISM, UNSPECIFIED TYPE: ICD-10-CM

## 2021-06-25 DIAGNOSIS — D63.1 ANEMIA DUE TO STAGE 4 CHRONIC KIDNEY DISEASE (HCC): Primary | ICD-10-CM

## 2021-06-25 DIAGNOSIS — N18.4 ANEMIA DUE TO STAGE 4 CHRONIC KIDNEY DISEASE (HCC): Primary | ICD-10-CM

## 2021-06-25 DIAGNOSIS — E03.8 OTHER SPECIFIED HYPOTHYROIDISM: ICD-10-CM

## 2021-06-25 PROBLEM — N18.9 ANEMIA DUE TO CHRONIC KIDNEY DISEASE: Status: ACTIVE | Noted: 2021-06-25

## 2021-06-25 LAB
ABO GROUP BLD: NORMAL
ALBUMIN SERPL-MCNC: 4 G/DL (ref 3.5–5.2)
ALBUMIN/GLOB SERPL: 1.1 G/DL
ALP SERPL-CCNC: 206 U/L (ref 39–117)
ALT SERPL W P-5'-P-CCNC: 9 U/L (ref 1–41)
ANION GAP SERPL CALCULATED.3IONS-SCNC: 12 MMOL/L (ref 5–15)
AST SERPL-CCNC: 15 U/L (ref 1–40)
BASOPHILS # BLD AUTO: 0.05 10*3/MM3 (ref 0–0.2)
BASOPHILS NFR BLD AUTO: 0.9 % (ref 0–1.5)
BILIRUB SERPL-MCNC: 0.2 MG/DL (ref 0–1.2)
BLD GP AB SCN SERPL QL: NEGATIVE
BUN SERPL-MCNC: 43 MG/DL (ref 8–23)
BUN/CREAT SERPL: 18.2 (ref 7–25)
CALCIUM SPEC-SCNC: 8.8 MG/DL (ref 8.6–10.5)
CHLORIDE SERPL-SCNC: 106 MMOL/L (ref 98–107)
CHROMATIN AB SERPL-ACNC: <10 IU/ML (ref 0–14)
CO2 SERPL-SCNC: 20 MMOL/L (ref 22–29)
CREAT SERPL-MCNC: 2.36 MG/DL (ref 0.76–1.27)
CRP SERPL-MCNC: 0.32 MG/DL (ref 0–0.5)
DAT POLY-SP REAG RBC QL: NEGATIVE
DEPRECATED RDW RBC AUTO: 46.4 FL (ref 37–54)
EOSINOPHIL # BLD AUTO: 0.12 10*3/MM3 (ref 0–0.4)
EOSINOPHIL NFR BLD AUTO: 2.2 % (ref 0.3–6.2)
ERYTHROCYTE [DISTWIDTH] IN BLOOD BY AUTOMATED COUNT: 14.4 % (ref 12.3–15.4)
ERYTHROCYTE [SEDIMENTATION RATE] IN BLOOD: 18 MM/HR (ref 0–15)
FERRITIN SERPL-MCNC: 31.91 NG/ML (ref 30–400)
FOLATE SERPL-MCNC: 13 NG/ML (ref 4.78–24.2)
GFR SERPL CREATININE-BSD FRML MDRD: 27 ML/MIN/1.73
GLOBULIN UR ELPH-MCNC: 3.5 GM/DL
GLUCOSE SERPL-MCNC: 123 MG/DL (ref 65–99)
HAPTOGLOB SERPL-MCNC: 159 MG/DL (ref 30–200)
HCT VFR BLD AUTO: 22.8 % (ref 37.5–51)
HGB BLD-MCNC: 6.9 G/DL (ref 13–17.7)
IMM GRANULOCYTES # BLD AUTO: 0.02 10*3/MM3 (ref 0–0.05)
IMM GRANULOCYTES NFR BLD AUTO: 0.4 % (ref 0–0.5)
IRON 24H UR-MRATE: 25 MCG/DL (ref 59–158)
IRON SATN MFR SERPL: 6 % (ref 20–50)
LDH SERPL-CCNC: 181 U/L (ref 135–225)
LYMPHOCYTES # BLD AUTO: 1.53 10*3/MM3 (ref 0.7–3.1)
LYMPHOCYTES NFR BLD AUTO: 27.5 % (ref 19.6–45.3)
MCH RBC QN AUTO: 27 PG (ref 26.6–33)
MCHC RBC AUTO-ENTMCNC: 30.3 G/DL (ref 31.5–35.7)
MCV RBC AUTO: 89.1 FL (ref 79–97)
MONOCYTES # BLD AUTO: 0.6 10*3/MM3 (ref 0.1–0.9)
MONOCYTES NFR BLD AUTO: 10.8 % (ref 5–12)
NEUTROPHILS NFR BLD AUTO: 3.24 10*3/MM3 (ref 1.7–7)
NEUTROPHILS NFR BLD AUTO: 58.2 % (ref 42.7–76)
NRBC BLD AUTO-RTO: 0 /100 WBC (ref 0–0.2)
PLATELET # BLD AUTO: 143 10*3/MM3 (ref 140–450)
PMV BLD AUTO: 10.2 FL (ref 6–12)
POTASSIUM SERPL-SCNC: 4.4 MMOL/L (ref 3.5–5.2)
PROT SERPL-MCNC: 7.5 G/DL (ref 6–8.5)
RBC # BLD AUTO: 2.56 10*6/MM3 (ref 4.14–5.8)
RETICS # AUTO: 0.06 10*6/MM3 (ref 0.02–0.13)
RETICS/RBC NFR AUTO: 2.34 % (ref 0.7–1.9)
RH BLD: NEGATIVE
SODIUM SERPL-SCNC: 138 MMOL/L (ref 136–145)
T&S EXPIRATION DATE: NORMAL
T4 FREE SERPL-MCNC: 1.11 NG/DL (ref 0.93–1.7)
TIBC SERPL-MCNC: 420 MCG/DL (ref 298–536)
TRANSFERRIN SERPL-MCNC: 282 MG/DL (ref 200–360)
TSH SERPL DL<=0.05 MIU/L-ACNC: 3.15 UIU/ML (ref 0.27–4.2)
VIT B12 BLD-MCNC: 502 PG/ML (ref 211–946)
WBC # BLD AUTO: 5.56 10*3/MM3 (ref 3.4–10.8)

## 2021-06-25 PROCEDURE — 86900 BLOOD TYPING SEROLOGIC ABO: CPT

## 2021-06-25 PROCEDURE — 86880 COOMBS TEST DIRECT: CPT

## 2021-06-25 PROCEDURE — 84165 PROTEIN E-PHORESIS SERUM: CPT

## 2021-06-25 PROCEDURE — 99205 OFFICE O/P NEW HI 60 MIN: CPT | Performed by: INTERNAL MEDICINE

## 2021-06-25 PROCEDURE — 83010 ASSAY OF HAPTOGLOBIN QUANT: CPT

## 2021-06-25 PROCEDURE — 82607 VITAMIN B-12: CPT

## 2021-06-25 PROCEDURE — 82746 ASSAY OF FOLIC ACID SERUM: CPT

## 2021-06-25 PROCEDURE — 86140 C-REACTIVE PROTEIN: CPT

## 2021-06-25 PROCEDURE — 84439 ASSAY OF FREE THYROXINE: CPT

## 2021-06-25 PROCEDURE — 85025 COMPLETE CBC W/AUTO DIFF WBC: CPT

## 2021-06-25 PROCEDURE — 84238 ASSAY NONENDOCRINE RECEPTOR: CPT

## 2021-06-25 PROCEDURE — 84466 ASSAY OF TRANSFERRIN: CPT

## 2021-06-25 PROCEDURE — 85060 BLOOD SMEAR INTERPRETATION: CPT

## 2021-06-25 PROCEDURE — 82728 ASSAY OF FERRITIN: CPT

## 2021-06-25 PROCEDURE — 86038 ANTINUCLEAR ANTIBODIES: CPT

## 2021-06-25 PROCEDURE — P9040 RBC LEUKOREDUCED IRRADIATED: HCPCS

## 2021-06-25 PROCEDURE — 86923 COMPATIBILITY TEST ELECTRIC: CPT

## 2021-06-25 PROCEDURE — 84156 ASSAY OF PROTEIN URINE: CPT

## 2021-06-25 PROCEDURE — 36415 COLL VENOUS BLD VENIPUNCTURE: CPT

## 2021-06-25 PROCEDURE — 83540 ASSAY OF IRON: CPT

## 2021-06-25 PROCEDURE — 85651 RBC SED RATE NONAUTOMATED: CPT

## 2021-06-25 PROCEDURE — 84166 PROTEIN E-PHORESIS/URINE/CSF: CPT

## 2021-06-25 PROCEDURE — 84443 ASSAY THYROID STIM HORMONE: CPT

## 2021-06-25 PROCEDURE — 83615 LACTATE (LD) (LDH) ENZYME: CPT

## 2021-06-25 PROCEDURE — 36430 TRANSFUSION BLD/BLD COMPNT: CPT

## 2021-06-25 PROCEDURE — 80053 COMPREHEN METABOLIC PANEL: CPT

## 2021-06-25 PROCEDURE — 82525 ASSAY OF COPPER: CPT

## 2021-06-25 PROCEDURE — 86431 RHEUMATOID FACTOR QUANT: CPT

## 2021-06-25 PROCEDURE — 84630 ASSAY OF ZINC: CPT

## 2021-06-25 PROCEDURE — 86901 BLOOD TYPING SEROLOGIC RH(D): CPT

## 2021-06-25 PROCEDURE — P9016 RBC LEUKOCYTES REDUCED: HCPCS

## 2021-06-25 PROCEDURE — 86850 RBC ANTIBODY SCREEN: CPT

## 2021-06-25 PROCEDURE — 85045 AUTOMATED RETICULOCYTE COUNT: CPT

## 2021-06-25 RX ORDER — FAMOTIDINE 10 MG/ML
20 INJECTION, SOLUTION INTRAVENOUS AS NEEDED
Status: CANCELLED | OUTPATIENT
Start: 2021-06-29

## 2021-06-25 RX ORDER — DIPHENHYDRAMINE HYDROCHLORIDE 50 MG/ML
50 INJECTION INTRAMUSCULAR; INTRAVENOUS AS NEEDED
Status: CANCELLED | OUTPATIENT
Start: 2021-06-29

## 2021-06-25 RX ORDER — SODIUM CHLORIDE 9 MG/ML
250 INJECTION, SOLUTION INTRAVENOUS AS NEEDED
Status: CANCELLED | OUTPATIENT
Start: 2021-06-25

## 2021-06-25 RX ORDER — SODIUM CHLORIDE 9 MG/ML
250 INJECTION, SOLUTION INTRAVENOUS AS NEEDED
Status: DISCONTINUED | OUTPATIENT
Start: 2021-06-25 | End: 2021-06-25 | Stop reason: HOSPADM

## 2021-06-25 RX ORDER — SODIUM CHLORIDE 9 MG/ML
250 INJECTION, SOLUTION INTRAVENOUS ONCE
Status: CANCELLED | OUTPATIENT
Start: 2021-06-29

## 2021-06-25 RX ADMIN — SODIUM CHLORIDE 250 ML: 9 INJECTION, SOLUTION INTRAVENOUS at 14:20

## 2021-06-25 NOTE — PATIENT INSTRUCTIONS
Blood Transfusion, Adult, Care After  This sheet gives you information about how to care for yourself after your procedure. Your doctor may also give you more specific instructions. If you have problems or questions, contact your doctor.  What can I expect after the procedure?  After the procedure, it is common to have:  · Bruising and soreness at the IV site.  · A fever or chills on the day of the procedure. This may be your body's response to the new blood cells received.  · A headache.  Follow these instructions at home:  Insertion site care         · Follow instructions from your doctor about how to take care of your insertion site. This is where an IV tube was put into your vein. Make sure you:  ? Wash your hands with soap and water before and after you change your bandage (dressing). If you cannot use soap and water, use hand .  ? Change your bandage as told by your doctor.  · Check your insertion site every day for signs of infection. Check for:  ? Redness, swelling, or pain.  ? Bleeding from the site.  ? Warmth.  ? Pus or a bad smell.  General instructions  · Take over-the-counter and prescription medicines only as told by your doctor.  · Rest as told by your doctor.  · Go back to your normal activities as told by your doctor.  · Keep all follow-up visits as told by your doctor. This is important.  Contact a doctor if:  · You have itching or red, swollen areas of skin (hives).  · You feel worried or nervous (anxious).  · You feel weak after doing your normal activities.  · You have redness, swelling, warmth, or pain around the insertion site.  · You have blood coming from the insertion site, and the blood does not stop with pressure.  · You have pus or a bad smell coming from the insertion site.  Get help right away if:  · You have signs of a serious reaction. This may be coming from an allergy or the body's defense system (immune system). Signs include:  ? Trouble breathing or shortness of  breath.  ? Swelling of the face or feeling warm (flushed).  ? Fever or chills.  ? Head, chest, or back pain.  ? Dark pee (urine) or blood in the pee.  ? Widespread rash.  ? Fast heartbeat.  ? Feeling dizzy or light-headed.  You may receive your blood transfusion in an outpatient setting. If so, you will be told whom to contact to report any reactions.  These symptoms may be an emergency. Do not wait to see if the symptoms will go away. Get medical help right away. Call your local emergency services (911 in the [place] Do not drive yourself to the hospital.  Summary  · Bruising and soreness at the IV site are common.  · Check your insertion site every day for signs of infection.  · Rest as told by your doctor. Go back to your normal activities as told by your doctor.  · Get help right away if you have signs of a serious reaction.  This information is not intended to replace advice given to you by your health care provider. Make sure you discuss any questions you have with your health care provider.  Document Revised: 06/11/2020 Document Reviewed: 06/11/2020  [employer] Patient Education © 2021 [employer]    Blood Transfusion, Adult  A blood transfusion is a procedure in which you receive blood through an IV tube. You may need this procedure because of:  · A bleeding disorder.  · An illness.  · An injury.  · A surgery.  The blood may come from someone else (a donor). You may also be able to donate blood for yourself. The blood given in a transfusion is made up of different types of cells. You may get:  · Red blood cells. These carry oxygen to the cells in the body.  · White blood cells. These help you fight infections.  · Platelets. These help your blood to clot.  · Plasma. This is the liquid part of your blood. It carries proteins and other substances through the body.  If you have a clotting disorder, you may also get other types of blood products.  Tell your doctor about:  · Any blood disorders you have.  · Any  reactions you have had during a blood transfusion in the past.  · Any allergies you have.  · All medicines you are taking, including vitamins, herbs, eye drops, creams, and over-the-counter medicines.  · Any surgeries you have had.  · Any medical conditions you have. This includes any recent fever or cold symptoms.  · Whether you are pregnant or may be pregnant.  What are the risks?  Generally, this is a safe procedure. However, problems may occur.  · The most common problems include:  ? A mild allergic reaction. This includes red, swollen areas of skin (hives) and itching.  ? Fever or chills. This may be the body's response to new blood cells received. This may happen during or up to 4 hours after the transfusion.  · More serious problems may include:  ? Too much fluid in the lungs. This may cause breathing problems.  ? A serious allergic reaction. This includes breathing trouble or swelling around the face and lips.  ? Lung injury. This causes breathing trouble and low oxygen in the blood. This can happen within hours of the transfusion or days later.  ? Too much iron. This can happen after getting many blood transfusions over a period of time.  ? An infection or virus passed through the blood. This is rare. Donated blood is carefully tested before it is given.  ? Your body's defense system (immune system) trying to attack the new blood cells. This is rare. Symptoms may include fever, chills, nausea, low blood pressure, and low back or chest pain.  ? Donated cells attacking healthy tissues. This is rare.  What happens before the procedure?  Medicines  Ask your doctor about:  · Changing or stopping your normal medicines. This is important.  · Taking aspirin and ibuprofen. Do not take these medicines unless your doctor tells you to take them.  · Taking over-the-counter medicines, vitamins, herbs, and supplements.  General instructions  · Follow instructions from your doctor about what you cannot eat or drink.  · You  will have a blood test to find out your blood type. The test also finds out what type of blood your body will accept and matches it to the donor type.  · If you are going to have a planned surgery, you may be able to donate your own blood. This may be done in case you need a transfusion.  · You will have your temperature, blood pressure, and pulse checked.  · You may receive medicine to help prevent an allergic reaction. This may be done if you have had a reaction to a transfusion before. This medicine may be given to you by mouth or through an IV tube.  · This procedure lasts about 1-4 hours. Plan for the time you need.  What happens during the procedure?    · An IV tube will be put into one of your veins.  · The bag of donated blood will be attached to your IV tube. Then, the blood will enter through your vein.  · Your temperature, blood pressure, and pulse will be checked often. This is done to find early signs of a transfusion reaction.  · Tell your nurse right away if you have any of these symptoms:  ? Shortness of breath or trouble breathing.  ? Chest or back pain.  ? Fever or chills.  ? Red, swollen areas of skin or itching.  · If you have any signs or symptoms of a reaction, your transfusion will be stopped. You may also be given medicine.  · When the transfusion is finished, your IV tube will be taken out.  · Pressure may be put on the IV site for a few minutes.  · A bandage (dressing) will be put on the IV site.  The procedure may vary among doctors and hospitals.  What happens after the procedure?  · You will be monitored until you leave the hospital or clinic. This includes checking your temperature, blood pressure, pulse, breathing rate, and blood oxygen level.  · Your blood may be tested to see how you are responding to the transfusion.  · You may be warmed with fluids or blankets. This is done to keep the temperature of your body normal.  · If you have your procedure in an outpatient setting, you will  be told whom to contact to report any reactions.  Where to find more information  To learn more, visit the [employer]: [employer]  Summary  · A blood transfusion is a procedure in which you are given blood through an IV tube.  · The blood may come from someone else (a donor). You may also be able to donate blood for yourself.  · The blood you are given is made up of different blood cells. You may receive red blood cells, platelets, plasma, or white blood cells.  · Your temperature, blood pressure, and pulse will be checked often.  · After the procedure, your blood may be tested to see how you are responding.  This information is not intended to replace advice given to you by your health care provider. Make sure you discuss any questions you have with your health care provider.  Document Revised: 06/11/2020 Document Reviewed: 06/11/2020  [employer] Patient Education © 2021 [employer]

## 2021-06-25 NOTE — TELEPHONE ENCOUNTER
Left message for Ricardo that his iron level is low and that he needs an iron infusion before he can start the Procrit on 6/29/2021 at 1:15.

## 2021-06-25 NOTE — PROGRESS NOTES
Patient states he missed his noon Clonidine (takes 3 times daily) due to being at hospital all day; he assures me his bp will go down after he takes it when he gets home.  Instructed him if it does not come down or he is symptomatic (headache, dizziness, chest pain, SOA, weakness in one side) go to ER; he voiced understanding.

## 2021-06-25 NOTE — TELEPHONE ENCOUNTER
Call back to patient Ricardo Hugo and he was informed that he would be receiving [facility] 6[facility]25/21, he will need to return to the [facility] for his Type & Cross and his transfusion of 1 unit PRBC. He v/u and said he was on his way.     Call to [facility] spoke to Gilda, she was informed patient Ricardo Hugo will be returning for a Type & Cross today and transfusion.   Patient lives in [facility] near [facility]

## 2021-06-26 LAB
ANA SER QL: NEGATIVE
BH BB BLOOD EXPIRATION DATE: NORMAL
BH BB BLOOD TYPE BARCODE: 9500
BH BB DISPENSE STATUS: NORMAL
BH BB PRODUCT CODE: NORMAL
BH BB UNIT NUMBER: NORMAL
CROSSMATCH INTERPRETATION: NORMAL
UNIT  ABO: NORMAL
UNIT  RH: NORMAL

## 2021-06-28 LAB
ALBUMIN SERPL ELPH-MCNC: 3.7 G/DL (ref 2.9–4.4)
ALBUMIN/GLOB SERPL: 1 {RATIO} (ref 0.7–1.7)
ALPHA1 GLOB SERPL ELPH-MCNC: 0.3 G/DL (ref 0–0.4)
ALPHA2 GLOB SERPL ELPH-MCNC: 0.7 G/DL (ref 0.4–1)
B-GLOBULIN SERPL ELPH-MCNC: 1.1 G/DL (ref 0.7–1.3)
CYTOLOGIST CVX/VAG CYTO: NORMAL
GAMMA GLOB SERPL ELPH-MCNC: 1.5 G/DL (ref 0.4–1.8)
GLOBULIN SER CALC-MCNC: 3.6 G/DL (ref 2.2–3.9)
LABORATORY COMMENT REPORT: NORMAL
M PROTEIN SERPL ELPH-MCNC: NORMAL G/DL
PATH INTERP BLD-IMP: NORMAL
PROT PATTERN SERPL ELPH-IMP: NORMAL
PROT SERPL-MCNC: 7.3 G/DL (ref 6–8.5)
STFR SERPL-SCNC: 35.4 NMOL/L (ref 12.2–27.3)

## 2021-06-29 ENCOUNTER — INFUSION (OUTPATIENT)
Dept: ONCOLOGY | Facility: HOSPITAL | Age: 71
End: 2021-06-29

## 2021-06-29 VITALS
HEART RATE: 58 BPM | TEMPERATURE: 97.4 F | WEIGHT: 171 LBS | BODY MASS INDEX: 23.16 KG/M2 | OXYGEN SATURATION: 98 % | HEIGHT: 72 IN | DIASTOLIC BLOOD PRESSURE: 59 MMHG | RESPIRATION RATE: 18 BRPM | SYSTOLIC BLOOD PRESSURE: 159 MMHG

## 2021-06-29 DIAGNOSIS — N18.4 CKD (CHRONIC KIDNEY DISEASE) STAGE 4, GFR 15-29 ML/MIN (HCC): ICD-10-CM

## 2021-06-29 DIAGNOSIS — D63.8 ANEMIA, CHRONIC DISEASE: Primary | ICD-10-CM

## 2021-06-29 LAB
ALBUMIN MFR UR ELPH: 60.8 %
ALPHA1 GLOB MFR UR ELPH: 2.1 %
ALPHA2 GLOB MFR UR ELPH: 6 %
B-GLOBULIN MFR UR ELPH: 15.7 %
GAMMA GLOB MFR UR ELPH: 15.3 %
LABORATORY COMMENT REPORT: NORMAL
M PROTEIN MFR UR ELPH: NORMAL %
PROT UR-MCNC: 184.1 MG/DL
ZINC SERPL-MCNC: 146 UG/DL (ref 44–115)

## 2021-06-29 PROCEDURE — 25010000002 FERRIC CARBOXYMALTOSE 750 MG/15ML SOLUTION 15 ML VIAL: Performed by: INTERNAL MEDICINE

## 2021-06-29 PROCEDURE — 96365 THER/PROPH/DIAG IV INF INIT: CPT

## 2021-06-29 RX ORDER — SODIUM CHLORIDE 9 MG/ML
250 INJECTION, SOLUTION INTRAVENOUS ONCE
Status: COMPLETED | OUTPATIENT
Start: 2021-06-29 | End: 2021-06-29

## 2021-06-29 RX ORDER — FAMOTIDINE 10 MG/ML
20 INJECTION, SOLUTION INTRAVENOUS AS NEEDED
Status: DISCONTINUED | OUTPATIENT
Start: 2021-06-29 | End: 2021-06-29 | Stop reason: HOSPADM

## 2021-06-29 RX ORDER — DIPHENHYDRAMINE HYDROCHLORIDE 50 MG/ML
50 INJECTION INTRAMUSCULAR; INTRAVENOUS AS NEEDED
Status: DISCONTINUED | OUTPATIENT
Start: 2021-06-29 | End: 2021-06-29 | Stop reason: HOSPADM

## 2021-06-29 RX ADMIN — SODIUM CHLORIDE 250 ML: 9 INJECTION, SOLUTION INTRAVENOUS at 13:50

## 2021-06-29 RX ADMIN — FERRIC CARBOXYMALTOSE INJECTION 750 MG: 50 INJECTION, SOLUTION INTRAVENOUS at 13:50

## 2021-06-30 ENCOUNTER — TRANSCRIBE ORDERS (OUTPATIENT)
Dept: LAB | Facility: HOSPITAL | Age: 71
End: 2021-06-30

## 2021-06-30 DIAGNOSIS — Z01.818 PREOPERATIVE TESTING: Primary | ICD-10-CM

## 2021-06-30 LAB — COPPER SERPL-MCNC: 46 UG/DL (ref 69–132)

## 2021-06-30 RX ORDER — COPPER GLUCONATE 2 MG
2 TABLET ORAL DAILY
Qty: 30 TABLET | Refills: 6 | Status: SHIPPED | OUTPATIENT
Start: 2021-06-30 | End: 2021-07-06 | Stop reason: SDUPTHER

## 2021-07-01 RX ORDER — ERGOCALCIFEROL 1.25 MG/1
50000 CAPSULE ORAL WEEKLY
Qty: 5 CAPSULE | Refills: 11 | Status: SHIPPED | OUTPATIENT
Start: 2021-07-01 | End: 2021-09-10 | Stop reason: SDUPTHER

## 2021-07-02 ENCOUNTER — TELEPHONE (OUTPATIENT)
Dept: VASCULAR SURGERY | Facility: CLINIC | Age: 71
End: 2021-07-02

## 2021-07-02 NOTE — TELEPHONE ENCOUNTER
Spoke with Guero Wan letting her know that I had spoke with Elena KATZ and she advised that Mr Hugo could hold Plavix but they would like him to continue on aspirin if possible.        ----- Message -----  From: Analisa Salgado  Sent: 6/29/2021  12:13 PM CDT  To: STERLING Murray called from [facility] stating Mr Hugo needed a Bone Marrow Biopsy and they was wanting to know how long prior that Mr Hugo could hold Plavix and Aspirin. If you can please advise. Thanks

## 2021-07-03 PROBLEM — E61.0 COPPER DEFICIENCY: Status: ACTIVE | Noted: 2021-07-03

## 2021-07-03 PROBLEM — E61.1 LOW IRON: Status: ACTIVE | Noted: 2021-07-03

## 2021-07-03 PROBLEM — D64.9 ANEMIA: Status: ACTIVE | Noted: 2021-03-17

## 2021-07-06 ENCOUNTER — LAB (OUTPATIENT)
Dept: LAB | Facility: HOSPITAL | Age: 71
End: 2021-07-06

## 2021-07-06 ENCOUNTER — OFFICE VISIT (OUTPATIENT)
Dept: ONCOLOGY | Facility: CLINIC | Age: 71
End: 2021-07-06

## 2021-07-06 ENCOUNTER — INFUSION (OUTPATIENT)
Dept: ONCOLOGY | Facility: HOSPITAL | Age: 71
End: 2021-07-06

## 2021-07-06 VITALS
HEART RATE: 66 BPM | DIASTOLIC BLOOD PRESSURE: 66 MMHG | TEMPERATURE: 97.7 F | OXYGEN SATURATION: 96 % | SYSTOLIC BLOOD PRESSURE: 146 MMHG | BODY MASS INDEX: 23.96 KG/M2 | WEIGHT: 176.9 LBS | RESPIRATION RATE: 18 BRPM | HEIGHT: 72 IN

## 2021-07-06 VITALS
TEMPERATURE: 96.9 F | HEART RATE: 63 BPM | RESPIRATION RATE: 18 BRPM | DIASTOLIC BLOOD PRESSURE: 62 MMHG | SYSTOLIC BLOOD PRESSURE: 155 MMHG

## 2021-07-06 DIAGNOSIS — D63.1 ANEMIA DUE TO STAGE 4 CHRONIC KIDNEY DISEASE (HCC): ICD-10-CM

## 2021-07-06 DIAGNOSIS — E61.0 COPPER DEFICIENCY: Primary | ICD-10-CM

## 2021-07-06 DIAGNOSIS — N18.4 ANEMIA DUE TO STAGE 4 CHRONIC KIDNEY DISEASE (HCC): ICD-10-CM

## 2021-07-06 DIAGNOSIS — E61.0 COPPER DEFICIENCY: ICD-10-CM

## 2021-07-06 DIAGNOSIS — N18.4 CRD (CHRONIC RENAL DISEASE), STAGE IV (HCC): Primary | ICD-10-CM

## 2021-07-06 DIAGNOSIS — D64.9 ANEMIA, UNSPECIFIED TYPE: ICD-10-CM

## 2021-07-06 DIAGNOSIS — E61.1 LOW IRON: ICD-10-CM

## 2021-07-06 LAB
ALBUMIN SERPL-MCNC: 4.1 G/DL (ref 3.5–5.2)
ALBUMIN/GLOB SERPL: 1.1 G/DL
ALP SERPL-CCNC: 260 U/L (ref 39–117)
ALT SERPL W P-5'-P-CCNC: 14 U/L (ref 1–41)
ANION GAP SERPL CALCULATED.3IONS-SCNC: 10 MMOL/L (ref 5–15)
AST SERPL-CCNC: 18 U/L (ref 1–40)
BILIRUB SERPL-MCNC: 0.2 MG/DL (ref 0–1.2)
BUN SERPL-MCNC: 42 MG/DL (ref 8–23)
BUN/CREAT SERPL: 18.2 (ref 7–25)
CALCIUM SPEC-SCNC: 9 MG/DL (ref 8.6–10.5)
CHLORIDE SERPL-SCNC: 108 MMOL/L (ref 98–107)
CO2 SERPL-SCNC: 22 MMOL/L (ref 22–29)
CREAT SERPL-MCNC: 2.31 MG/DL (ref 0.76–1.27)
DEPRECATED RDW RBC AUTO: 51.8 FL (ref 37–54)
ERYTHROCYTE [DISTWIDTH] IN BLOOD BY AUTOMATED COUNT: 16.1 % (ref 12.3–15.4)
FERRITIN SERPL-MCNC: 735.8 NG/ML (ref 30–400)
GFR SERPL CREATININE-BSD FRML MDRD: 28 ML/MIN/1.73
GLOBULIN UR ELPH-MCNC: 3.8 GM/DL
GLUCOSE SERPL-MCNC: 109 MG/DL (ref 65–99)
HCT VFR BLD AUTO: 28.7 % (ref 37.5–51)
HGB BLD-MCNC: 8.6 G/DL (ref 13–17.7)
IRON 24H UR-MRATE: 93 MCG/DL (ref 59–158)
IRON SATN MFR SERPL: 25 % (ref 20–50)
MCH RBC QN AUTO: 26.9 PG (ref 26.6–33)
MCHC RBC AUTO-ENTMCNC: 30 G/DL (ref 31.5–35.7)
MCV RBC AUTO: 89.7 FL (ref 79–97)
PLATELET # BLD AUTO: 139 10*3/MM3 (ref 140–450)
PMV BLD AUTO: 10.3 FL (ref 6–12)
POTASSIUM SERPL-SCNC: 4.6 MMOL/L (ref 3.5–5.2)
PROT SERPL-MCNC: 7.9 G/DL (ref 6–8.5)
RBC # BLD AUTO: 3.2 10*6/MM3 (ref 4.14–5.8)
SODIUM SERPL-SCNC: 140 MMOL/L (ref 136–145)
TIBC SERPL-MCNC: 370 MCG/DL (ref 298–536)
TRANSFERRIN SERPL-MCNC: 248 MG/DL (ref 200–360)
WBC # BLD AUTO: 6.03 10*3/MM3 (ref 3.4–10.8)

## 2021-07-06 PROCEDURE — 80053 COMPREHEN METABOLIC PANEL: CPT

## 2021-07-06 PROCEDURE — 82728 ASSAY OF FERRITIN: CPT

## 2021-07-06 PROCEDURE — 96372 THER/PROPH/DIAG INJ SC/IM: CPT

## 2021-07-06 PROCEDURE — 85027 COMPLETE CBC AUTOMATED: CPT

## 2021-07-06 PROCEDURE — 36415 COLL VENOUS BLD VENIPUNCTURE: CPT

## 2021-07-06 PROCEDURE — 25010000002 EPOETIN ALFA-EPBX 40000 UNIT/ML SOLUTION: Performed by: INTERNAL MEDICINE

## 2021-07-06 PROCEDURE — 83540 ASSAY OF IRON: CPT

## 2021-07-06 PROCEDURE — 84466 ASSAY OF TRANSFERRIN: CPT

## 2021-07-06 PROCEDURE — 82668 ASSAY OF ERYTHROPOIETIN: CPT

## 2021-07-06 PROCEDURE — 99213 OFFICE O/P EST LOW 20 MIN: CPT | Performed by: INTERNAL MEDICINE

## 2021-07-06 RX ORDER — COPPER GLUCONATE 2 MG
2 TABLET ORAL DAILY
Qty: 30 TABLET | Refills: 6 | Status: SHIPPED | OUTPATIENT
Start: 2021-07-06 | End: 2023-01-13

## 2021-07-06 RX ORDER — COPPER GLUCONATE 2 MG
2 TABLET ORAL DAILY
Qty: 30 TABLET | Refills: 3 | Status: SHIPPED | OUTPATIENT
Start: 2021-07-06 | End: 2021-07-21 | Stop reason: SDUPTHER

## 2021-07-06 RX ADMIN — EPOETIN ALFA-EPBX 40000 UNITS: 40000 INJECTION, SOLUTION INTRAVENOUS; SUBCUTANEOUS at 14:45

## 2021-07-06 NOTE — PROGRESS NOTES
"[facility] GROUP HEMATOLOGY AND ONCOLOGY  [address]  [facility] [address] [phone]    Patient Name: Ricardo Hugo  Encounter Date: 07/20/2021  YOB: 1950  Patient Number: [id number]      Subjective: 71-year-old male who has a known history of chronic kidney disease who is referred by Dr. Rueda from nephrology for anemia.  The notes from [facility] show that the patient was seen on 6/3/2021 where his creatinine was noted to be 2.5 that was a little bit worse than in March 2021 but had developed worsening anemia with a hemoglobin of 7.3 and was thought to be \"very deficient in iron\".      The patient was also seen by Dr. Omalley from pulmonary medicine when the patient's PCP, Dr. Velasco, had asked pulmonary to see the patient for worrisome dyspnea.  It was noted that 10 months prior an echocardiogram that shown suggestion of pulmonary hypertension.  At that time the patient did not believe that his dyspnea was worse than it was a year previously.  Upon review of the patient's previous medical problems, there is a cornucopia of medical illnesses with the ones that could possibly be related to anemia including but not limited to the following: Three-vessel CAD, arthritis, Crohn's disease (\"slight case\" according to what he was told) with chronic diarrhea, iron deficiency anemia, personal history of alcoholism (and he states that he quit drinking in 2013).    Denies noted bleeding anywhere (only when he was on a blood thinner when he had some nosebleeds -- changed the blood thinner and the bleeding stopped).  He was only recently diagnosed with iron deficienc.  He does  eat meat on a regular basis.  Denies  signs of pica syndrome.    He had previously seen Dr. Hernandez 10 years ago but he doesn't recall why and does not knwo the resulst of the marrow (the office doesn't keep records for that long).    On follow-up on July 6, 2021: After the " patient's initial consultation, he was noted to have a hemoglobin less than 7 and therefore called in for a transfusion.  He states that he is feeling better after the transfusion was given.     6.25.21 1 unit PRBC                                     Patient was also noted to have a low copper and a high zinc on labs on initial consultation and copper supplementation was supposed to be started on  6/30/202 but patient did not receive from pharmacy and therefore re-ordered 7.6.21.  Hemoglobin to 8.6 on 7/6/2021.  Scheduled for BM Bx on 7.12.21 -- to hold plavix for 5 days before the procedure but OK to continue ASA    On follow-up on 7/20/2021:  Was found to have low copper and started on supplements 7.6.21, he denies any bleeding   Bone marrow biopsy done 7/12/2021 with a flow cytometry showing a 2% monoclonal B-cell kappa population consistent with CLL/SLL.  Ultrasound of the abdomen from 7/12/2021 shows cirrhotic liver but normal spleen size.    Patient was started on prednisone Dosepak by PCP on 7/16/2021 for allergies and has been noted to have an improvement in his CBC with a hemoglobin of 11.3 today.  However, the patient is also on erythropoietin injections as well as received iron supplementation which may also have everything to do with the increase in hemoglobin today.    Past Medical History:   Diagnosis Date   • 3-vessel CAD 8/11/2020   • Allergic rhinitis    • Anxiety disorder 4/27/2020   • Arthritis    • Asymmetrical sensorineural hearing loss 6/28/2017   • Atherosclerosis of native artery of both lower extremities with intermittent claudication (CMS/HCC) 7/18/2019   • Avascular necrosis of femoral head, left (CMS/HCC) 07/11/2020    right hip after surgery   • Carotid stenosis    • Chronic mucoid otitis media    • Chronic rhinitis    • Coronary artery disease     HEART BYPASS 2004   • Crohn's disease of large intestine with other complication (CMS/HCC) 7/30/2020    Chronic diarrhea Colonoscopy July  2020 revealed mild patchy scattered hemosiderin staining with inflammation more so in rectosigmoid area.  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   • Iron deficiency anemia 7/14/2020   • Mixed hearing loss of left ear    • PAD (peripheral artery disease) (CMS/[facility], located within [facility]) 8/11/2020   • Perianal abscess    • Pernicious anemia 08/17/2020    took shots but never diagnosed with b12 deficiency   • Personal history of alcoholism (CMS/[facility], located within [facility]) 08/11/2020    quit drinking in 2013   • Prostatic hypertrophy 8/11/2020   • Sensorineural hearing loss    • Sepsis with acute renal failure (CMS/[facility], located within [facility]) 9/15/2020   • Shortness of breath 5/27/2021   • Tinnitus    • Ventricular tachycardia, nonsustained (CMS/[facility]AC, located within [facility]) 7/14/2020   • Weight loss 7/11/2020       Oncology History:  Oncology/Hematology History    No history exists.       Past Surgical History:  Past Surgical History:   Procedure Laterality Date   • ARTERY SURGERY  2021    right side on neck   • CAROTID ENDARTERECTOMY Right 5/10/2021    Procedure: RIGHT CAROTID ENDARTERECTOMY WITH EEG;  Surgeon: Gil Pineda DO;  Location: [facility] OR ;  Service: Vascular;  Laterality: Right;   • COLONOSCOPY N/A 7/2/2020    Procedure: COLONOSCOPY WITH ANESTHESIA;  Surgeon: Adrien Brewster MD;  Location: [facility] ENDOSCOPY;  Service: Gastroenterology;  Laterality: N/A;  pre op: diarrhea  post op: polyps  PCP: Joe Velasco MD   • COLONOSCOPY N/A 10/13/2020    Procedure: COLONOSCOPY WITH ANESTHESIA;  Surgeon: Adrien Brewster MD;  Location: [facility] ENDOSCOPY;  Service: Gastroenterology;  Laterality: N/A;  Pre:  Chronic Diarrhea, Crohn's  Post: AVM  Dr. Neftali Velasco  CO2 Inflation Used   • CORONARY ARTERY BYPASS GRAFT  2003    x3   • EYE SURGERY Bilateral     catorac   • INCISION AND DRAINAGE PERIRECTAL ABSCESS N/A 3/3/2017    Procedure: INCISION AND DRAINAGE OF JEET ANAL ABSCESS;  Surgeon: Lynette Smith MD;  Location: [facility] OR;  Service:    • MYRINGOTOMY W/ TUBES Left 04/17/2017    06/10/2016   • TONSILLECTOMY     • TOTAL HIP ARTHROPLASTY Right 2006      ___________________________________________________________________________________________________________________________________________________  Medications:   Outpatient Encounter Medications as of 7/20/2021   Medication Sig Dispense Refill   • albuterol sulfate  (90 Base) MCG/ACT inhaler USE 2 INHALATIONS FOUR TIMES A DAY AS NEEDED 20.1 g 3   • amLODIPine (NORVASC) 10 MG tablet Take 10 mg by mouth Daily.     • aspirin (ASPIR) 81 MG EC tablet Take 81 mg by mouth Daily.     • Aspirin-Acetaminophen-Caffeine (EXCEDRIN EXTRA STRENGTH PO) Take 2 tablets by mouth Daily As Needed (HEADACHES).     • atorvastatin (LIPITOR) 10 MG tablet Take 1 tablet by mouth Daily. 90 tablet 3   • azelastine (ASTELIN) 0.1 % nasal spray 1 spray into the nostril(s) as directed by provider 2 (Two) Times a Day. Use in each nostril as directed 90 mL 3   • cholestyramine (QUESTRAN) 4 g packet Take 1 packet by mouth Daily. 90 packet 3   • cloNIDine (CATAPRES) 0.2 MG tablet TAKE 3 TABLETS DAILY 270 tablet 3   • clopidogrel (Plavix) 75 MG tablet Take 1 tablet by mouth Daily. 90 tablet 3   • Copper Gluconate 2 MG tablet Take 2 mg by mouth Daily. 30 tablet 3   • Copper Gluconate 2 MG tablet Take 2 mg by mouth Daily. 30 tablet 6   • desoximetasone (TOPICORT) 0.25 % cream APPLY TO THE AFFECTED AREA TWICE A DAY AS NEEDED 30 g 3   • diphenhydrAMINE (Benadryl Allergy) 25 mg capsule Take 25 mg by mouth Every 6 (Six) Hours As Needed for Itching.     • fexofenadine (ALLEGRA) 180 MG tablet Take 180 mg by  mouth Daily.     • fluticasone (FLONASE) 50 MCG/ACT nasal spray 2 sprays into the nostril(s) as directed by provider Daily As Needed for Rhinitis.     • Fluticasone Furoate-Vilanterol (Breo Ellipta) 200-25 MCG/INH inhaler Inhale 1 puff Daily for 14 days. 1 each 0   • levothyroxine (Synthroid) 75 MCG tablet Take 1 tablet by mouth Daily. 90 tablet 3   • magnesium chloride ER 64 MG DR tablet Take 143 mg by mouth Daily.     • Melatonin 10 MG capsule Take 2 capsules by mouth Every Night.     • mesalamine (APRISO) 0.375 g 24 hr capsule Take 4 capsules by mouth Daily. 360 capsule 3   • metoprolol succinate XL (TOPROL-XL) 25 MG 24 hr tablet Take 1 tablet by mouth Daily. 90 tablet 3   • Multiple Vitamins-Minerals (PRESERVISION/LUTEIN) capsule Take 1 capsule by mouth 2 (two) times a day.     • omeprazole (priLOSEC) 20 MG capsule Take 1 capsule by mouth Daily. 90 capsule 3   • potassium chloride 10 MEQ CR tablet Take 1 tablet by mouth 2 (Two) Times a Day. 180 tablet 3   • sodium bicarbonate 650 MG tablet Take 1 tablet by mouth 2 (Two) Times a Day. 180 tablet 3   • valsartan (DIOVAN) 160 MG tablet TAKE 1 TABLET DAILY 90 tablet 3   • vitamin D (ERGOCALCIFEROL) 1.25 MG (79498 UT) capsule capsule Take 1 capsule by mouth 1 (One) Time Per Week. 5 capsule 11   • [DISCONTINUED] Copper Gluconate 2 MG tablet Take 2 mg by mouth Daily. 30 tablet 6     No facility-administered encounter medications on file as of 7/20/2021.     ___________________________________________________________________________________________________________________________________________________  Allergies:   Allergies   Allergen Reactions   • Lortab [Hydrocodone-Acetaminophen] Other (See Comments) and Hallucinations     CLOSTROPHOBIC   • Allopurinol Other (See Comments)     Pain on right side       Social/Family History:   Family History   Problem Relation Age of Onset   • No Known Problems Mother    • No Known Problems Father    • No Known Problems Daughter   "  • Colon cancer Neg Hx    • Colon polyps Neg Hx         /Single/:      Social History     Tobacco Use   • Smoking status: Former Smoker     Packs/day: 0.50     Years: 25.00     Pack years: 12.50     Types: Cigarettes     Start date:      Quit date: 10/13/2013     Years since quittin.7   • Smokeless tobacco: Never Used   • Tobacco comment: quit 2013   Vaping Use   • Vaping Use: Never used   Substance Use Topics   • Alcohol use: Yes     Alcohol/week: 5.0 standard drinks     Types: 5 Cans of beer per week     Comment: rare- very little, 5 beers a week but previously was an alcoholic when he was drinking 10 beers a day and was also drinking vodka   • Drug use: No        Occupation: Retired equipment rental, 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 2021     Review of Systems   Constitutional: Negative.  Negative for activity change, appetite change, chills, fatigue, fever, unexpected weight gain and unexpected weight loss.   HENT: Positive for dental problem and hearing loss. Negative for congestion, ear pain, mouth sores, nosebleeds, sore throat, swollen glands and trouble swallowing.         Dentures   Eyes: Negative.  Negative for blurred vision, double vision, photophobia and pain.   Respiratory: Negative for apnea, cough, chest tightness, shortness of breath and wheezing.         SOB resolved after the transfusion on 21   Cardiovascular: Positive for leg swelling. Negative for chest pain and palpitations.        Left foot will occasionally swell which resolves with a \"water pill\"   Gastrointestinal: Negative.  Negative for abdominal distention, abdominal pain, anal bleeding, blood in stool, constipation, diarrhea, nausea, vomiting and " GERD.   Endocrine: Negative for cold intolerance and heat intolerance.   Genitourinary: Negative.  Negative for breast discharge, dysuria, frequency and hematuria.   Musculoskeletal: Positive for arthralgias. Negative for back pain, gait problem, myalgias and neck stiffness.   Skin: Positive for bruise. Negative for color change, pallor, rash and skin lesions.   Allergic/Immunologic: Positive for environmental allergies. Negative for immunocompromised state.   Neurological: Negative.  Negative for dizziness, syncope, weakness and light-headedness.   Hematological: Negative for adenopathy. Bruises/bleeds easily.   Psychiatric/Behavioral: Negative.  Negative for suicidal ideas and depressed mood. The patient is not nervous/anxious.      ___________________________________________________________________________________________________________________________________________________  I have reexamined the patient and the results are consistent with the previously documented exam. Amit Goldberg, MD     Physical Examination:   There were no vitals filed for this visit. There is no height or weight on file to calculate BSA.   Physical Exam  Constitutional:       Appearance: Normal appearance. He is well-developed, well-groomed and normal weight.   HENT:      Head: Normocephalic and atraumatic.      Nose: Nose normal.      Mouth/Throat:      Mouth: Mucous membranes are dry.   Eyes:      Pupils: Pupils are equal, round, and reactive to light.      Comments: + conjunctival palor   Cardiovascular:      Rate and Rhythm: Normal rate and regular rhythm.      Pulses: Normal pulses.      Heart sounds: Murmur heard.   Systolic murmur is present with a grade of 3/6.     Pulmonary:      Effort: Pulmonary effort is normal.      Breath sounds: Normal breath sounds and air entry.   Chest:      Comments: Bilateral mild gynecomastia  Abdominal:      General: Abdomen is flat. Bowel sounds are normal.      Palpations: Abdomen is soft. There  "is no hepatomegaly or splenomegaly.      Comments: Minimal \"frog belly\" with no palpable HSM   Musculoskeletal:         General: Normal range of motion.      Cervical back: Neck supple.      Right lower leg: Normal. No edema.      Left lower leg: Normal. No edema.   Lymphadenopathy:      Head:      Right side of head: No submental, submandibular or occipital adenopathy.      Left side of head: No submental, submandibular or occipital adenopathy.      Cervical:      Right cervical: No superficial cervical adenopathy.     Left cervical: No superficial cervical adenopathy.      Upper Body:      Right upper body: No supraclavicular or axillary adenopathy.      Left upper body: No supraclavicular or axillary adenopathy.      Lower Body: No right inguinal adenopathy. No left inguinal adenopathy.   Skin:     General: Skin is warm and dry.      Comments: Frail skin with some noted ecchymoses on the UE's bilaterally   Neurological:      General: No focal deficit present.      Mental Status: He is alert and oriented to person, place, and time.   Psychiatric:         Attention and Perception: Attention and perception normal.         Mood and Affect: Mood normal.         Speech: Speech normal.         Behavior: Behavior normal.         Thought Content: Thought content normal.         Cognition and Memory: Cognition and memory normal.         Judgment: Judgment normal.       ___________________________________________________________________________________________________________________________________________________  Labs/X-ray results:     Lab Results - Last 18 Months   Lab Units 07/06/21  1320 06/25/21  1155 05/10/21  1055 05/07/21  1213 03/11/21  0951 09/21/20  0338 09/20/20  0431 09/19/20  0427 08/11/20  0936 07/22/20  1526 07/11/20  1114 04/28/20  1232   HEMOGLOBIN g/dL 8.6* 6.9* 8.3* 7.6* 9.7* 8.5* 8.6* 8.5*   < > 9.9*  --  13.1   HEMATOCRIT % 28.7* 22.8* 26.7* 24.0* 30.1* 26.3* 26.2* 25.6*   < > 31.4*  --  37.4*   MCV " fL 89.7 89.1 97.1* 97.2* 99.0* 94.6 92.3 91.1   < > 104.0*  --  97.9*   WBC 10*3/mm3 6.03 5.56 5.40 6.57 5.06 9.68 11.58* 13.76*   < > 10.84*  --  11.64*   RDW % 16.1* 14.4 13.5 13.7 12.6 15.8* 16.3* 15.9*   < > 13.2  --  13.0   MPV fL 10.3 10.2 10.5 10.4  --  11.1 11.1 10.6   < >  --   --   --    PLATELETS 10*3/mm3 139* 143 113* 119* 103* 126* 114* 116*   < > 263  --  151   IMM GRAN % %  --  0.4 0.4 0.5  --  2.0* 0.9* 0.9*   < >  --   --   --    NEUTROS ABS 10*3/mm3  --  3.24 3.49 4.54 3.04 6.90 8.91* 12.11*   < > 7.71*   < > 10.45*   LYMPHS ABS 10*3/mm3  --  1.53 1.15 1.21 1.28 1.29 1.12 0.55*   < > CANCELED  1.34  --  CANCELED  0.00*   MONOS ABS 10*3/mm3  --  0.60 0.58 0.62 0.52 0.73 0.82 0.68   < > 1.34*   < > 0.95*   EOS ABS 10*3/mm3  --  0.12 0.11 0.13 0.16 0.53* 0.58* 0.24   < > CANCELED  --  CANCELED   EOSINOPHIL ABS 10*3/mm3  --   --   --   --   --   --   --   --   --  0.44*  --  0.12   EOSINOPHIL % %  --   --   --   --   --   --   --   --   --  4.1  --  1.0   BASOS ABS 10*3/mm3  --  0.05 0.05 0.04 0.05 0.04 0.04 0.06   < > CANCELED  --  CANCELED   IMMATURE GRANS (ABS) 10*3/mm3  --  0.02 0.02 0.03  --  0.19* 0.11* 0.12*   < >  --   --   --    NRBC /100 WBC  --  0.0 0.0 0.0 0.0 0.0 0.0 0.0   < > 0.0   < >  --    NEUTROPHIL % %  --   --   --   --   --   --   --   --   --  71.1  --  89.8*   MONOCYTES % %  --   --   --   --   --   --   --   --   --  12.4*  --  8.2    < > = values in this interval not displayed.       Lab Results - Last 18 Months   Lab Units 07/06/21  1320 06/25/21  1155 05/07/21  1213 04/12/21  0856 03/11/21  0951 09/22/20  0346 09/21/20  0338 09/19/20  0427 09/18/20  0032 09/17/20  0326   GLUCOSE mg/dL 109* 123* 109*  --   --  103* 102* 96 116* 132*   SODIUM mmol/L 140 138 139  --  139 135* 133* 138 136 135*   POTASSIUM mmol/L 4.6 4.4 5.0  --  4.4 5.7* 5.2 3.9 3.0* 2.8*   TOTAL CO2 mmol/L  --   --   --   --  20.5*  --   --   --   --   --    CO2 mmol/L 22.0 20.0* 18.0*  --   --  20.0* 18.0*  20.0* 24.0 23.0   CHLORIDE mmol/L 108* 106 110*  --  107 104 105 107 101 101   ANION GAP mmol/L 10.0 12.0 11.0  --   --  11.0 10.0 11.0 11.0 11.0   CREATININE mg/dL 2.31* 2.36* 2.30* 3.10* 2.22* 2.52*  2.52* 2.26* 2.60* 2.61* 2.87*   BUN mg/dL 42* 43* 40*  --  40* 37* 28* 28* 34* 34*   BUN / CREAT RATIO  18.2 18.2 17.4  --  18.0 14.7 12.4 10.8 13.0 11.8   CALCIUM mg/dL 9.0 8.8 8.9  --  8.5* 9.0 8.7 8.8 8.0* 8.0*   EGFR IF NONAFRICN AM mL/min/1.73 28* 27* 28*  --  29* 25*  25* 29* 24* 24* 22*   ALK PHOS U/L 260* 206*  --   --  218*  --  205* 167* 122* 127*   TOTAL PROTEIN g/dL 7.9 7.5  --   --   --   --  6.3 6.3 5.9* 6.0   ALT (SGPT) U/L 14 9  --   --  16  --  9 9 9 11   AST (SGOT) U/L 18 15  --   --  19  --  18 14 12 11   BILIRUBIN mg/dL 0.2 0.2  --   --  0.3  --  0.3 0.4 <0.2 0.2   ALBUMIN g/dL 4.10 4.00  3.7  --   --  3.90  --  2.70* 2.80* 2.50* 2.70*   GLOBULIN gm/dL 3.8 3.5  --   --   --   --  3.6 3.5 3.4 3.3       Lab Results - Last 18 Months   Lab Units 06/25/21  1155 03/11/21  0951 09/15/20  1934 07/11/20  1114 07/02/20  0901   M-SPIKE g/dL Not Observed  --   --   --   --    URIC ACID mg/dL  --  8.7*  --  9.9*  --    LDH U/L 181  --  166  --   --    REFERENCE LAB REPORT   --   --   --   --  See Attached Report       Lab Results - Last 18 Months   Lab Units 07/06/21  1320 06/25/21  1155 06/21/21  1206 05/05/21  1014 03/11/21  0951 09/16/20  0941 07/22/20  1526 07/13/20  0538 07/11/20  1114 06/04/20  1017   IRON mcg/dL 93 25*  --   --   --  11*  --  32*  --   --    TIBC mcg/dL 370 420  --   --   --  183* 281 173*  --   --    IRON SATURATION % 25 6*  --   --   --  6* 22 19*  --   --    FERRITIN ng/mL 735.80* 31.91  --   --   --   --   --   --   --   --    TSH uIU/mL  --  3.150 4.170 6.550* 8.010*  --   --   --  4.780* 3.700   FOLATE ng/mL  --  13.00  --   --   --   --  7.14  --   --   --      ____________________________________________________________________________  Radiology: Pulmonary Function  Test    Result Date: 2021  Narrative: New Omalley MD     2021  5:18 PM Pulmonary Function Test Performed by: Kailey Koenig, RRT Authorized by: New Omalley MD  Pre Drug % Predicted  FVC: 71%  FEV1: 60%  FEF 25-75%: 32%  FEV1/FVC: 65.82%  T%  RV: 140%  DLCO: 36%  D/VAsb: 44% Interpretation Spirometry Spirometry shows moderate obstruction. There is reduced midflow suggesting small airway/airflow obstruction. Review of FVL curve Patient's effort is normal. Lung Volume Measurements Measurements show elevated residual volume consistent with gas trapping. Diffusion Capacity The patient's diffusion capacity is severely reduced.  Diffusion capacity is moderately reduced when corrected for alveolar volume.              ___________________________________________________________________________________________________________________________________________________  Assessment/Plans:     Date  3/2/2017 2020  2020 9/15/20 3.11.21  2021..21  (gave 1 unit PRBC on 21)  2021 7.12.21  7.13.21   WBC  9.04  11.64  19.49 21.52 5.06  5.40  5.56  6.03 7.91 5.84   Hb  13.6  13.1  11.1 11.1 9.7  8.3  6.9  8.6 10.2 9.2   MCV  95.4  97.9  98.3  91.5 99.0  97.1  89.1  89.7 92.1 90.4   Plt  122  151  291  190 103  113  143  139 158 129   ANC  6.43   16.51  17.19   3.49  3.24   4.42    ALC  1.31   1.45  1.94   1.15  1.53   2.37    AMC  0.97  0.91  2.23 (!!!)    0.58  0.60   0.80    AEC  0.27  0.02  0  0.11  0.12   0.21    ABC  0.03  0.07  0.07  0.05  0.05   0.06    Ferritin       31.91 735.80 (after injectafer)     Iron       25 (L) 93     Iron SAT       6% (L) 25%     Transferrin       282 248     TIBC       420 370     Hapto       159      LDH       181      NOÉ       NEG      Mahesh        46 (L)      Zinc       146 (H)      Retic        2.34      B12        502      Folate        13      Hep C    NEG         Hepatitis B    NEG         HIV             Creatinine  0.79  1.30  4.38  2.92    2.36 2.31     Glucose  103  125 179  104    123 109     ALT  19  73  48 19    9 14     AST  28  63 30 18    15 18     Alk phos  120  114  228 197    206 260     EGFR 97  54 13 (!!!) 21    28     CRP    23.95    0.32      ESR       18      Uric acid      8.7        TSH      8.010   3.50      T4        1.11      PT/PTT      15.8/36       RF       <10      MICHAEL       NEG      SPEP       No M spike, WNL      UPEP       WNL, NO M spike      STR       35.4 (H)      EPO       18.3 (WNL)        Date 7.20.21            WBC 10.99            Hb 11.3            MCV 93.7            Plt 173            ANC 7.39            ALC 2.26            AMC 1.03            AEC 0.06            ABC 0.06            Ferritin             Iron             Iron SAT             Transferrin             TIBC             Hapto             LDH             NOÉ             Mahesh             Zinc             Retic             B12             Folate             Hep C             Hepatitis B             HIV             Creatinine             Glucose             ALT             AST             Alk phos             EGFR             CRP             ESR             Uric acid             TSH             T4             PT/PTT             RF             MICHAEL             SPEP             UPEP             STR             EPO                 ** Anemia, likely ACD and WALE, no evidence of AIHA  ** anemia may be secondary to underlying CLL ( Stage III on the Szymanski staging system) or due to ACD due to iron deficiency and renal insufficiency  **Bone marrow biopsy showing 2% monoclonal B-cell kappa population consistent with CLL/SLL  ** Renal Insufficiency   -As you may well know, chronic renal insufficiency can lead to severe anemia  - As in all anemias, it may be prudent to check:   o reticulocyte counts elevated  o folate WNL  o B12 level WNL  o Haptoglobin WNL  o LDH WNL  o Peripheral smear: Severe normochromic normocytic anemia.  Rare circulating  schistocytes. Normal white blood cell count with normal differential and morphology. No circulating blasts. Adequate platelets with normal morphology.   o NOÉ negative   o ferritin WNL --> increased to >700 on 7.6.21 as received injectafer recently  o iron profile showing low iron saturation and iron but normal TIBC and transferrin saturation, may be indicative of iron deficiency or low iron levels --> iron profil corrected on 7.6.21  o Copper abnormally low at 46, started copper supplementation on 6/30/2021 but patient did not receive from pharmacy for some reason. Reordered on 7.6.21 -- and he did start that  o Zinc elevated at 146 on day of consultation. Causes include:  • In Multivitamins -- to stop all multivitamin which have zinc   • Pork  • Fortified cereals  • Chicken leg  • Lobster  • Baked beans  • Dry roasted cashews  • Low fat yogurt with fruit  • Chickpeas (garbanzo beans)  • Almonds  • Milk  • Chicken breast  • Cheddar cheese  • Mozzarella  • Kidney beans  • Green peas  • Sesame seeds  • Flounder or sole  • Oysters  • Toasted wheat germ  • Veal liver  • Roast beef  • Roasted pumpkin and squash seeds  • Dried watermelon seeds  • Dark chocolate and cocoa powder  • Lamb  • Peanuts  • Crab  o SPEP WNL, no m spike  o UPEP WNL, no M spike  o Soluble transferrin receptor ELEVATED but with original ferritin level may be indicative of combination of iron deficiency and ACD   o EPO level WNL(should be low and anemia of chronic disease) May be indicative of both iron deficiency as well as ACD  o TFT's WNL  o Beta 2 microglobulin  o Immunoglobulin level  •  Anemia should  be treated with transfusions based on symptoms or usually a hb <7 (with some exceptions).  •  ABILIO replacement requires ferritin >100 and iron saturation >20% for Hb <10.  S/P injectafer 6.29.21, Started Epogen on 7.6.21      -In the history of a patient with Crohn's disease, it is likely that iron will be low due to chronic blood loss. This may  "be occurring in this patient and would therefore start iron supplementation: received injectafer as below  -As you probably know, iron deficiency is never normal in a male and in this patient's history of Crohn's disease, and colonoscopy was normal on 7/2020 patient may need further work-up including EGD, small bowel pill endoscopy and even nuclear medicine bleeding scan to rule out blood loss.  This should be evaluated by GI   -Given this patient's age, the level of anemia and the added macrocytosis with monocytosis on and off, would recommend to proceed with a bone marrow biopsy to rule out any infiltrative disorders and he agrees (he had it done 10 years ago at [facility] with Dr. Hernandez but he is not sure why he had it done and says he had \"something\" but not sure what and those records are not available after so many years).  Ordered on 6.25.21 is scheduled on 7.12.21 (has to hold the plavix for 5 days but OK to take aspirin as per IR)  Bone marrow biopsy done 7/12/2021:  • Flow Cytometry Showing 2% kappa B-cell monoclonal population consistent with CLL/SLL   • CLL FISH panel pending  • Cytogenetic pending    -As patient has progressive anemia, this is a clear indication for treatment and therefore will be transferred to either Dr. Delgadillo or [facility] for   further management possibly with chemotherapy.    - was started on prednisone dose pack by PCP 7/16/21 for allergies and Hb has improved significantly      - if patient is symptomatically anemic or Hb is <7.0 can receive transfusions as was needed after the first consultation  6.25.21 1 unit PRBC   6.29.21 750 mg Injectafer   7.6.21 Started EPO supplementation                             - patient took b12 injections in the past but states that he was never officially diagnosed with b12 deficiency  - on 7.20.21, patient was on steroids for allergies by PCP and Hb improved significantly (from 9.3 --> 11.3).  Thsi goes along with the iddea that it may " in fact be the undelying CLL that is causing the anemia.  However, also would have been at the time that the infused iron would have started to work as well as receiving procrit.  Plan: We will await cytogenetics and FISH studies from the CLL panel prior to recommendations being finalized but at this point would opt to maintain patient on erythropoietin and iron as needed and observe what happens when he is finished with his steroid dose which is scheduled to be done by 7/22/2021.  Patient will follow up with me on 8/3/2021 with the plan to consider treatment for CLL if hemoglobin drops significantly after steroids are stopped      ** Infection status:   · Covid vaccination status MODERNA with the second dose in 4/2021    **Metabolic / Renal:   · Chronic renal failure with latest EGFR of 28.  This places the patient at a grade 4 renal failure.  This can most certainly be a causative agent in anemia      ** Endocrine:   · hypothyroidism being treated by PCP and can certainly affect anemia    ** GI:   -IBD SGI diagnostic consistent with Crohn's disease with chronic diarrhea can lead to anemia  -History of chronic alcoholism can most certainly be an explanation for thrombocytopenia due to hepatosplenomegaly and hypersplenism.  For better evaluation would proceed with a ultrasound of the abdomen, 7.12.21: 1.  Cirrhotic  morphology/appearance of the liver. No solid liver lesion. No evidence of ascites. 2.  Spleen is within normal limits in size measuring 12.0 x 4.8 x 1.0 cm.3.  Cholelithiasis without evidence of cholecystitis. This may require GI evaluation  -Noted to have an elevated alkaline phosphatase and sometimes elevated ALT and AST.  May be related to either alcoholism and or Crohn's disease and should be followed up by GI and PCP.  -As above, patient may need EGD, small bowel pill endoscopy and may be even a nuclear medicine bleeding scan to rule out blood loss from the GI tract leading to iron deficiency .   Specifically, EGD might help to answer the question of possible varices in view of the underlying cirrhosis    ** Pulmonary:   · Current smoking status former smoker, quit in 2013  -History of pulmonary hypertension being followed by pulmonary Dr. Omalley    ** Cardiology:   -Ventricular tachycardia, CAD, atherosclerosis with intermittent claudication, carotid stenosis, heart disease, hyperlipidemia, hypertension all to be followed up by PCP/cardiology as needed    ** Skin / Rash:   · ecchymoses that may be due to age or blood thinner or both      ** Rheumatology/Musculoskeletal  -History of avascular necrosis of the femoral head after surgery on the hip  - arthritis can also lead to anemia    ** Neurologic:   · Hearing loss and tinnitus to be followed up by PCP    ** Health Maintenance  -  Last colonoscopy 7/2/2020: 1.  Large intestine, cecum, biopsy: A.  Fragments of benign colonic mucosa demonstrating minimal active inflammation, nonspecific. B.  No glandular dysplasia identified. 2.  Large intestine, transverse colon, biopsy:  A.  Fragments of benign colonic mucosa demonstrating mild active inflammation, nonspecific. B.  No glandular dysplasia identified.  3.  Large intestine, distal transverse colon polyps x2, polypectomy: Fragments of adenomatous polyps.   4.  Large intestine, colon at 35 cm, biopsy: A.  Fragments of benign colonic mucosa demonstrating mild active inflammation, nonspecific. B.  No glandular dysplasia identified. 5.  Large intestine, hepatic flexure, polypectomy: Fragments of adenomatous polyp. 6.  Large intestine, proximal transverse colon, polypectomy: Polypoid fragment of benign colonic mucosa.  Comment: Histologic changes of an adenomatous polyp or hyperplastic polyp are not identified.   7.  Large intestine, rectum, biopsy: A.  Fragments of benign, large intestinal mucosa demonstrating minimal active inflammation, nonspecific. B.  No glandular dysplasia identified.    Advance Care Planning    ACP discussion was held with the patient during this visit. Patient does not have an advance directive, information provided.FULL CODE and HCP is alex Hugo     - Follow up 8/3/2021    Amit Goldberg, MD    Total time spent caring for patient, reviewing lab and radiology information, and explaining the plan of care to the patient was 30 minutes

## 2021-07-06 NOTE — PATIENT INSTRUCTIONS
Epoetin Anselmo injection  What is this medicine?  EPOETIN ANSELMO (e MICHELLE e tin AL fa) helps your body make more red blood cells. This medicine is used to treat anemia caused by chronic kidney disease, cancer chemotherapy, or HIV-therapy. It may also be used before surgery if you have anemia.  This medicine may be used for other purposes; ask your health care provider or pharmacist if you have questions.  COMMON BRAND NAME(S): Epogen, Procrit, Retacrit  What should I tell my health care provider before I take this medicine?  They need to know if you have any of these conditions:  · cancer  · heart disease  · high blood pressure  · history of blood clots  · history of stroke  · low levels of folate, iron, or vitamin B12 in the blood  · seizures  · an unusual or allergic reaction to erythropoietin, albumin, benzyl alcohol, hamster proteins, other medicines, foods, dyes, or preservatives  · pregnant or trying to get pregnant  · breast-feeding  How should I use this medicine?  This medicine is for injection into a vein or under the skin. It is usually given by a health care professional in a hospital or clinic setting.  If you get this medicine at home, you will be taught how to prepare and give this medicine. Use exactly as directed. Take your medicine at regular intervals. Do not take your medicine more often than directed.  It is important that you put your used needles and syringes in a special sharps container. Do not put them in a trash can. If you do not have a sharps container, call your pharmacist or healthcare provider to get one.  A special MedGuide will be given to you by the pharmacist with each prescription and refill. Be sure to read this information carefully each time.  Talk to your pediatrician regarding the use of this medicine in children. While this drug may be prescribed for selected conditions, precautions do apply.  Overdosage: If you think you have taken too much of this medicine contact a poison  control center or emergency room at once.  NOTE: This medicine is only for you. Do not share this medicine with others.  What if I miss a dose?  If you miss a dose, take it as soon as you can. If it is almost time for your next dose, take only that dose. Do not take double or extra doses.  What may interact with this medicine?  Interactions have not been studied.  This list may not describe all possible interactions. Give your health care provider a list of all the medicines, herbs, non-prescription drugs, or dietary supplements you use. Also tell them if you smoke, drink alcohol, or use illegal drugs. Some items may interact with your medicine.  What should I watch for while using this medicine?  Your condition will be monitored carefully while you are receiving this medicine.  You may need blood work done while you are taking this medicine.  This medicine may cause a decrease in vitamin B6. You should make sure that you get enough vitamin B6 while you are taking this medicine. Discuss the foods you eat and the vitamins you take with your health care professional.  What side effects may I notice from receiving this medicine?  Side effects that you should report to your doctor or health care professional as soon as possible:  · allergic reactions like skin rash, itching or hives, swelling of the face, lips, or tongue  · seizures  · signs and symptoms of a blood clot such as breathing problems; changes in vision; chest pain; severe, sudden headache; pain, swelling, warmth in the leg; trouble speaking; sudden numbness or weakness of the face, arm or leg  · signs and symptoms of a stroke like changes in vision; confusion; trouble speaking or understanding; severe headaches; sudden numbness or weakness of the face, arm or leg; trouble walking; dizziness; loss of balance or coordination  Side effects that usually do not require medical attention (report to your doctor or health care professional if they continue or are  bothersome):  · chills  · cough  · dizziness  · fever  · headaches  · joint pain  · muscle cramps  · muscle pain  · nausea, vomiting  · pain, redness, or irritation at site where injected  This list may not describe all possible side effects. Call your doctor for medical advice about side effects. You may report side effects to FDA at 3-656-PQR-3356.  Where should I keep my medicine?  Keep out of the reach of children.  Store in a refrigerator between 2 and 8 degrees C (36 and 46 degrees F). Do not freeze or shake. Throw away any unused portion if using a single-dose vial. Multi-dose vials can be kept in the refrigerator for up to 21 days after the initial dose. Throw away unused medicine.  NOTE: This sheet is a summary. It may not cover all possible information. If you have questions about this medicine, talk to your doctor, pharmacist, or health care provider.  © 2021 Elsevier/Gold Standard (2018-07-27 08:35:19)

## 2021-07-07 LAB — EPO SERPL-ACNC: 18.3 MIU/ML (ref 2.6–18.5)

## 2021-07-09 ENCOUNTER — LAB (OUTPATIENT)
Dept: LAB | Facility: HOSPITAL | Age: 71
End: 2021-07-09

## 2021-07-09 LAB — SARS-COV-2 ORF1AB RESP QL NAA+PROBE: NOT DETECTED

## 2021-07-09 PROCEDURE — U0005 INFEC AGEN DETEC AMPLI PROBE: HCPCS | Performed by: INTERNAL MEDICINE

## 2021-07-09 PROCEDURE — C9803 HOPD COVID-19 SPEC COLLECT: HCPCS | Performed by: INTERNAL MEDICINE

## 2021-07-09 PROCEDURE — U0004 COV-19 TEST NON-CDC HGH THRU: HCPCS | Performed by: INTERNAL MEDICINE

## 2021-07-12 ENCOUNTER — HOSPITAL ENCOUNTER (OUTPATIENT)
Dept: CT IMAGING | Facility: HOSPITAL | Age: 71
Discharge: HOME | End: 2021-07-12

## 2021-07-12 ENCOUNTER — HOSPITAL ENCOUNTER (OUTPATIENT)
Dept: ULTRASOUND IMAGING | Facility: HOSPITAL | Age: 71
Discharge: HOME | End: 2021-07-12

## 2021-07-12 VITALS
HEART RATE: 55 BPM | HEIGHT: 65 IN | OXYGEN SATURATION: 100 % | DIASTOLIC BLOOD PRESSURE: 70 MMHG | BODY MASS INDEX: 29.38 KG/M2 | TEMPERATURE: 97.3 F | RESPIRATION RATE: 14 BRPM | WEIGHT: 176.37 LBS | SYSTOLIC BLOOD PRESSURE: 144 MMHG

## 2021-07-12 DIAGNOSIS — N18.4 CRD (CHRONIC RENAL DISEASE), STAGE IV (HCC): ICD-10-CM

## 2021-07-12 DIAGNOSIS — D69.6 THROMBOCYTOPENIA (HCC): ICD-10-CM

## 2021-07-12 DIAGNOSIS — F10.21 HISTORY OF ALCOHOLISM (HCC): ICD-10-CM

## 2021-07-12 LAB
APTT PPP: 37.7 SECONDS (ref 24.1–35)
BASOPHILS # BLD AUTO: 0.06 10*3/MM3 (ref 0–0.2)
BASOPHILS NFR BLD AUTO: 0.8 % (ref 0–1.5)
DEPRECATED RDW RBC AUTO: 58.9 FL (ref 37–54)
EOSINOPHIL # BLD AUTO: 0.21 10*3/MM3 (ref 0–0.4)
EOSINOPHIL NFR BLD AUTO: 2.7 % (ref 0.3–6.2)
ERYTHROCYTE [DISTWIDTH] IN BLOOD BY AUTOMATED COUNT: 18.8 % (ref 12.3–15.4)
HCT VFR BLD AUTO: 33.8 % (ref 37.5–51)
HGB BLD-MCNC: 10.2 G/DL (ref 13–17.7)
IMM GRANULOCYTES # BLD AUTO: 0.05 10*3/MM3 (ref 0–0.05)
IMM GRANULOCYTES NFR BLD AUTO: 0.6 % (ref 0–0.5)
INR PPP: 1.28 (ref 0.91–1.09)
LYMPHOCYTES # BLD AUTO: 2.37 10*3/MM3 (ref 0.7–3.1)
LYMPHOCYTES NFR BLD AUTO: 30 % (ref 19.6–45.3)
MCH RBC QN AUTO: 27.8 PG (ref 26.6–33)
MCHC RBC AUTO-ENTMCNC: 30.2 G/DL (ref 31.5–35.7)
MCV RBC AUTO: 92.1 FL (ref 79–97)
MONOCYTES # BLD AUTO: 0.8 10*3/MM3 (ref 0.1–0.9)
MONOCYTES NFR BLD AUTO: 10.1 % (ref 5–12)
NEUTROPHILS NFR BLD AUTO: 4.42 10*3/MM3 (ref 1.7–7)
NEUTROPHILS NFR BLD AUTO: 55.8 % (ref 42.7–76)
NRBC BLD AUTO-RTO: 0 /100 WBC (ref 0–0.2)
PLATELET # BLD AUTO: 158 10*3/MM3 (ref 140–450)
PMV BLD AUTO: 9.8 FL (ref 6–12)
PROTHROMBIN TIME: 15 SECONDS (ref 11.5–13.4)
RBC # BLD AUTO: 3.67 10*6/MM3 (ref 4.14–5.8)
WBC # BLD AUTO: 7.91 10*3/MM3 (ref 3.4–10.8)

## 2021-07-12 PROCEDURE — 88313 SPECIAL STAINS GROUP 2: CPT | Performed by: INTERNAL MEDICINE

## 2021-07-12 PROCEDURE — 88237 TISSUE CULTURE BONE MARROW: CPT

## 2021-07-12 PROCEDURE — 77012 CT SCAN FOR NEEDLE BIOPSY: CPT

## 2021-07-12 PROCEDURE — 76700 US EXAM ABDOM COMPLETE: CPT

## 2021-07-12 PROCEDURE — 88184 FLOWCYTOMETRY/ TC 1 MARKER: CPT

## 2021-07-12 PROCEDURE — 88185 FLOWCYTOMETRY/TC ADD-ON: CPT | Performed by: INTERNAL MEDICINE

## 2021-07-12 PROCEDURE — 88271 CYTOGENETICS DNA PROBE: CPT

## 2021-07-12 PROCEDURE — 25010000002 FENTANYL CITRATE (PF) 50 MCG/ML SOLUTION: Performed by: RADIOLOGY

## 2021-07-12 PROCEDURE — 88305 TISSUE EXAM BY PATHOLOGIST: CPT | Performed by: INTERNAL MEDICINE

## 2021-07-12 PROCEDURE — 85610 PROTHROMBIN TIME: CPT | Performed by: RADIOLOGY

## 2021-07-12 PROCEDURE — 88305 TISSUE EXAM BY PATHOLOGIST: CPT

## 2021-07-12 PROCEDURE — 88313 SPECIAL STAINS GROUP 2: CPT

## 2021-07-12 PROCEDURE — 88264 CHROMOSOME ANALYSIS 20-25: CPT | Performed by: INTERNAL MEDICINE

## 2021-07-12 PROCEDURE — 88264 CHROMOSOME ANALYSIS 20-25: CPT

## 2021-07-12 PROCEDURE — 25010000003 LIDOCAINE 1 % SOLUTION: Performed by: RADIOLOGY

## 2021-07-12 PROCEDURE — 88237 TISSUE CULTURE BONE MARROW: CPT | Performed by: INTERNAL MEDICINE

## 2021-07-12 PROCEDURE — 88184 FLOWCYTOMETRY/ TC 1 MARKER: CPT | Performed by: INTERNAL MEDICINE

## 2021-07-12 PROCEDURE — 25010000002 MIDAZOLAM PER 1 MG: Performed by: RADIOLOGY

## 2021-07-12 PROCEDURE — 85025 COMPLETE CBC W/AUTO DIFF WBC: CPT | Performed by: INTERNAL MEDICINE

## 2021-07-12 PROCEDURE — 88341 IMHCHEM/IMCYTCHM EA ADD ANTB: CPT

## 2021-07-12 PROCEDURE — 88311 DECALCIFY TISSUE: CPT | Performed by: INTERNAL MEDICINE

## 2021-07-12 PROCEDURE — 88185 FLOWCYTOMETRY/TC ADD-ON: CPT

## 2021-07-12 PROCEDURE — 88342 IMHCHEM/IMCYTCHM 1ST ANTB: CPT

## 2021-07-12 PROCEDURE — 88275 CYTOGENETICS 100-300: CPT

## 2021-07-12 PROCEDURE — 85730 THROMBOPLASTIN TIME PARTIAL: CPT | Performed by: RADIOLOGY

## 2021-07-12 RX ORDER — LIDOCAINE HYDROCHLORIDE 10 MG/ML
INJECTION, SOLUTION INFILTRATION; PERINEURAL
Status: COMPLETED | OUTPATIENT
Start: 2021-07-12 | End: 2021-07-12

## 2021-07-12 RX ORDER — ALBUTEROL SULFATE 90 UG/1
AEROSOL, METERED RESPIRATORY (INHALATION)
Qty: 20.1 G | Refills: 3 | Status: SHIPPED | OUTPATIENT
Start: 2021-07-12

## 2021-07-12 RX ORDER — SODIUM CHLORIDE 0.9 % (FLUSH) 0.9 %
10 SYRINGE (ML) INJECTION AS NEEDED
Status: DISCONTINUED | OUTPATIENT
Start: 2021-07-12 | End: 2021-07-13 | Stop reason: HOSPADM

## 2021-07-12 RX ORDER — FENTANYL CITRATE 50 UG/ML
INJECTION, SOLUTION INTRAMUSCULAR; INTRAVENOUS
Status: COMPLETED | OUTPATIENT
Start: 2021-07-12 | End: 2021-07-12

## 2021-07-12 RX ORDER — MIDAZOLAM HYDROCHLORIDE 1 MG/ML
INJECTION INTRAMUSCULAR; INTRAVENOUS
Status: COMPLETED | OUTPATIENT
Start: 2021-07-12 | End: 2021-07-12

## 2021-07-12 RX ADMIN — LIDOCAINE HYDROCHLORIDE 10 ML: 10 INJECTION, SOLUTION INFILTRATION; PERINEURAL at 10:11

## 2021-07-12 RX ADMIN — FENTANYL CITRATE 50 MCG: 50 INJECTION, SOLUTION INTRAMUSCULAR; INTRAVENOUS at 10:09

## 2021-07-12 RX ADMIN — MIDAZOLAM 1.5 MG: 1 INJECTION INTRAMUSCULAR; INTRAVENOUS at 10:09

## 2021-07-12 NOTE — INTERVAL H&P NOTE
CT GUIDED BONE MARROW BIOPSY  H&P reviewed. The patient was examined and there are no changes to the H&P.    Risks, alternatives and benefits explained to the patient. All questions were answered prior to the exam. Plan is for moderate sedation.

## 2021-07-13 ENCOUNTER — LAB (OUTPATIENT)
Dept: LAB | Facility: HOSPITAL | Age: 71
End: 2021-07-13

## 2021-07-13 ENCOUNTER — INFUSION (OUTPATIENT)
Dept: ONCOLOGY | Facility: HOSPITAL | Age: 71
End: 2021-07-13

## 2021-07-13 VITALS
BODY MASS INDEX: 22.62 KG/M2 | HEART RATE: 60 BPM | TEMPERATURE: 97.6 F | SYSTOLIC BLOOD PRESSURE: 167 MMHG | HEIGHT: 72 IN | OXYGEN SATURATION: 99 % | RESPIRATION RATE: 16 BRPM | DIASTOLIC BLOOD PRESSURE: 60 MMHG | WEIGHT: 167 LBS

## 2021-07-13 DIAGNOSIS — D63.1 ANEMIA DUE TO STAGE 4 CHRONIC KIDNEY DISEASE (HCC): ICD-10-CM

## 2021-07-13 DIAGNOSIS — N18.4 CRD (CHRONIC RENAL DISEASE), STAGE IV (HCC): ICD-10-CM

## 2021-07-13 DIAGNOSIS — D63.1 ANEMIA DUE TO STAGE 4 CHRONIC KIDNEY DISEASE (HCC): Primary | ICD-10-CM

## 2021-07-13 DIAGNOSIS — N18.4 CRD (CHRONIC RENAL DISEASE), STAGE IV (HCC): Primary | ICD-10-CM

## 2021-07-13 DIAGNOSIS — N18.4 ANEMIA DUE TO STAGE 4 CHRONIC KIDNEY DISEASE (HCC): ICD-10-CM

## 2021-07-13 DIAGNOSIS — N18.4 ANEMIA DUE TO STAGE 4 CHRONIC KIDNEY DISEASE (HCC): Primary | ICD-10-CM

## 2021-07-13 LAB
BASOPHILS # BLD AUTO: 0.04 10*3/MM3 (ref 0–0.2)
BASOPHILS NFR BLD AUTO: 0.7 % (ref 0–1.5)
DEPRECATED RDW RBC AUTO: 60.4 FL (ref 37–54)
EOSINOPHIL # BLD AUTO: 0.26 10*3/MM3 (ref 0–0.4)
EOSINOPHIL NFR BLD AUTO: 4.5 % (ref 0.3–6.2)
ERYTHROCYTE [DISTWIDTH] IN BLOOD BY AUTOMATED COUNT: 18.9 % (ref 12.3–15.4)
HCT VFR BLD AUTO: 30.3 % (ref 37.5–51)
HGB BLD-MCNC: 9.2 G/DL (ref 13–17.7)
HOLD SPECIMEN: NORMAL
IMM GRANULOCYTES # BLD AUTO: 0.03 10*3/MM3 (ref 0–0.05)
IMM GRANULOCYTES NFR BLD AUTO: 0.5 % (ref 0–0.5)
LYMPHOCYTES # BLD AUTO: 1.35 10*3/MM3 (ref 0.7–3.1)
LYMPHOCYTES NFR BLD AUTO: 23.1 % (ref 19.6–45.3)
MCH RBC QN AUTO: 27.5 PG (ref 26.6–33)
MCHC RBC AUTO-ENTMCNC: 30.4 G/DL (ref 31.5–35.7)
MCV RBC AUTO: 90.4 FL (ref 79–97)
MONOCYTES # BLD AUTO: 0.68 10*3/MM3 (ref 0.1–0.9)
MONOCYTES NFR BLD AUTO: 11.6 % (ref 5–12)
NEUTROPHILS NFR BLD AUTO: 3.48 10*3/MM3 (ref 1.7–7)
NEUTROPHILS NFR BLD AUTO: 59.6 % (ref 42.7–76)
NRBC BLD AUTO-RTO: 0 /100 WBC (ref 0–0.2)
PLATELET # BLD AUTO: 129 10*3/MM3 (ref 140–450)
PMV BLD AUTO: 10.2 FL (ref 6–12)
RBC # BLD AUTO: 3.35 10*6/MM3 (ref 4.14–5.8)
WBC # BLD AUTO: 5.84 10*3/MM3 (ref 3.4–10.8)

## 2021-07-13 PROCEDURE — 96372 THER/PROPH/DIAG INJ SC/IM: CPT

## 2021-07-13 PROCEDURE — 25010000002 EPOETIN ALFA-EPBX 40000 UNIT/ML SOLUTION: Performed by: INTERNAL MEDICINE

## 2021-07-13 PROCEDURE — 85025 COMPLETE CBC W/AUTO DIFF WBC: CPT

## 2021-07-13 PROCEDURE — 36415 COLL VENOUS BLD VENIPUNCTURE: CPT

## 2021-07-13 RX ADMIN — EPOETIN ALFA-EPBX 40000 UNITS: 40000 INJECTION, SOLUTION INTRAVENOUS; SUBCUTANEOUS at 14:07

## 2021-07-16 ENCOUNTER — OFFICE VISIT (OUTPATIENT)
Dept: INTERNAL MEDICINE | Facility: CLINIC | Age: 71
End: 2021-07-16

## 2021-07-16 VITALS
SYSTOLIC BLOOD PRESSURE: 160 MMHG | DIASTOLIC BLOOD PRESSURE: 88 MMHG | TEMPERATURE: 97.3 F | WEIGHT: 166 LBS | HEART RATE: 56 BPM | HEIGHT: 65 IN | OXYGEN SATURATION: 99 % | BODY MASS INDEX: 27.66 KG/M2

## 2021-07-16 DIAGNOSIS — J40 BRONCHITIS: Primary | ICD-10-CM

## 2021-07-16 DIAGNOSIS — J01.10 SUBACUTE FRONTAL SINUSITIS: ICD-10-CM

## 2021-07-16 PROCEDURE — 99213 OFFICE O/P EST LOW 20 MIN: CPT | Performed by: NURSE PRACTITIONER

## 2021-07-16 PROCEDURE — 96372 THER/PROPH/DIAG INJ SC/IM: CPT | Performed by: NURSE PRACTITIONER

## 2021-07-16 RX ORDER — TRIAMCINOLONE ACETONIDE 40 MG/ML
40 INJECTION, SUSPENSION INTRA-ARTICULAR; INTRAMUSCULAR ONCE
Status: COMPLETED | OUTPATIENT
Start: 2021-07-16 | End: 2021-07-16

## 2021-07-16 RX ORDER — METHYLPREDNISOLONE 4 MG/1
TABLET ORAL
Qty: 1 TABLET | Refills: 0 | Status: SHIPPED | OUTPATIENT
Start: 2021-07-16 | End: 2021-07-21

## 2021-07-16 RX ADMIN — TRIAMCINOLONE ACETONIDE 40 MG: 40 INJECTION, SUSPENSION INTRA-ARTICULAR; INTRAMUSCULAR at 13:41

## 2021-07-16 NOTE — PATIENT INSTRUCTIONS
Acute Bronchitis, Adult    Acute bronchitis is when air tubes in the lungs (bronchi) suddenly get swollen. The condition can make it hard for you to breathe. In adults, acute bronchitis usually goes away within 2 weeks. A cough caused by bronchitis may last up to 3 weeks. Smoking, allergies, and asthma can make the condition worse.  What are the causes?  This condition is caused by:  · Cold and flu viruses. The most common cause of this condition is the virus that causes the common cold.  · Bacteria.  · Substances that irritate the lungs, including:  ? Smoke from cigarettes and other types of tobacco.  ? Dust and pollen.  ? Fumes from chemicals, gases, or burned fuel.  ? Other materials that pollute indoor or outdoor air.  · Close contact with someone who has acute bronchitis.  What increases the risk?  The following factors may make you more likely to develop this condition:  · A weak body's defense system. This is also called the immune system.  · Any condition that affects your lungs and breathing, such as asthma.  What are the signs or symptoms?  Symptoms of this condition include:  · A cough.  · Coughing up clear, yellow, or green mucus.  · Wheezing.  · Chest congestion.  · Shortness of breath.  · A fever.  · Body aches.  · Chills.  · A sore throat.  How is this treated?  Acute bronchitis may go away over time without treatment. Your doctor may recommend:  · Drinking more fluids.  · Taking a medicine for a fever or cough.  · Using a device that gets medicine into your lungs (inhaler).  · Using a vaporizer or a humidifier. These are machines that add water or moisture in the air to help with coughing and poor breathing.  Follow these instructions at home:    Activity  · Get a lot of rest.  · Avoid places where there are fumes from chemicals.  · Return to your normal activities as told by your doctor. Ask your doctor what activities are safe for you.  Lifestyle  · Drink enough fluids to keep your pee (urine) pale  yellow.  · Do not drink alcohol.  · Do not use any products that contain nicotine or tobacco, such as cigarettes, e-cigarettes, and chewing tobacco. If you need help quitting, ask your doctor. Be aware that:  ? Your bronchitis will get worse if you smoke or breathe in other people's smoke (secondhand smoke).  ? Your lungs will heal faster if you quit smoking.  General instructions  · Take over-the-counter and prescription medicines only as told by your doctor.  · Use an inhaler, cool mist vaporizer, or humidifier as told by your doctor.  · Rinse your mouth often with salt water. To make salt water, dissolve ½-1 tsp (3-6 g) of salt in 1 cup (237 mL) of warm water.  · Keep all follow-up visits as told by your doctor. This is important.  How is this prevented?  To lower your risk of getting this condition again:  · Wash your hands often with soap and water. If soap and water are not available, use hand .  · Avoid contact with people who have cold symptoms.  · Try not to touch your mouth, nose, or eyes with your hands.  · Make sure to get the flu shot every year.  Contact a doctor if:  · Your symptoms do not get better in 2 weeks.  · You vomit more than once or twice.  · You have symptoms of loss of fluid from your body (dehydration). These include:  ? Dark urine.  ? Dry skin or eyes.  ? Increased thirst.  ? Headaches.  ? Confusion.  ? Muscle cramps.  Get help right away if:  · You cough up blood.  · You have chest pain.  · You have very bad shortness of breath.  · You become dehydrated.  · You faint or keep feeling like you are going to faint.  · You keep vomiting.  · You have a very bad headache.  · Your fever or chills get worse.  These symptoms may be an emergency. Do not wait to see if the symptoms will go away. Get medical help right away. Call your local emergency services (911 in the [place] Do not drive yourself to the hospital.  Summary  · Acute bronchitis is when air tubes in the lungs (bronchi)  suddenly get swollen. In adults, acute bronchitis usually goes away within 2 weeks.  · Take over-the-counter and prescription medicines only as told by your doctor.  · Drink enough fluid to keep your pee (urine) pale yellow.  · Contact a doctor if your symptoms do not improve after 2 weeks of treatment.  · Get help right away if you cough up blood, faint, or have chest pain or shortness of breath.  This information is not intended to replace advice given to you by your health care provider. Make sure you discuss any questions you have with your health care provider.  Document Revised: 07/10/2020 Document Reviewed: 07/10/2020  [employer] Patient Education © 2021 Elsevier Inc.

## 2021-07-16 NOTE — PROGRESS NOTES
"Chief Complaint  Allergies (productive cough-yellow phlegm, runny nose, sneezing, some sinus congestion x 1 week, denies fever, body aches and chills.)    Subjective          Ricardo PITER Hugo presents to [facility]  Mr. Hugo is a pleasant 71-year-old male who presents to the office today related to allergy symptoms worsening causing sinus congestion postnasal drainage reports productive cough for the last couple of days.  Patient reports that cough is thick but clear.  Patient reports having chronic issues with allergies.  He is taking Allegra during the day Benadryl tablet at night and using his allergy nasal spray as prescribed.  He reports that he has had chronic issues with allergies and states that once a year usually get bronchitis and require a steroid shot and a steroid pack.  Denies shortness of breath or requiring albuterol inhaler more than prescribed.    Allergies  This is a chronic problem. The current episode started more than 1 year ago. The problem occurs constantly. The problem has been gradually worsening. Associated symptoms include congestion and coughing. Pertinent negatives include no fatigue, fever, headaches, myalgias, nausea or rash. Exacerbated by: allergy worse in summer and fall. Treatments tried: allegra, benadryl nightly, nasal steroid spray daily. The treatment provided no relief.       Objective   Vital Signs:   /88 (BP Location: Left arm, Patient Position: Sitting, Cuff Size: Adult)   Pulse 56   Temp 97.3 °F (36.3 °C) (Temporal)   Ht 165.1 cm (65\")   Wt 75.3 kg (166 lb)   SpO2 99%   BMI 27.62 kg/m²     Physical Exam  Vitals and nursing note reviewed.   Constitutional:       Appearance: He is well-developed.   HENT:      Head: Normocephalic and atraumatic.      Right Ear: Ear canal and external ear normal. Tympanic membrane is injected and bulging.      Left Ear: Ear canal and external ear normal. Decreased hearing noted. Tympanic membrane " is injected and perforated.      Ears:        Nose: Mucosal edema, congestion and rhinorrhea present. No nasal tenderness. Rhinorrhea is clear.      Right Turbinates: Not enlarged, swollen or pale.      Left Turbinates: Not enlarged, swollen or pale.      Mouth/Throat:      Lips: Pink.      Mouth: Mucous membranes are moist.      Pharynx: Oropharynx is clear. Uvula midline.   Eyes:      General: Lids are normal. Lids are everted, no foreign bodies appreciated. Vision grossly intact. Allergic shiner present.      Extraocular Movements: Extraocular movements intact.      Conjunctiva/sclera: Conjunctivae normal.      Pupils: Pupils are equal, round, and reactive to light.   Neck:      Thyroid: No thyromegaly.   Cardiovascular:      Rate and Rhythm: Normal rate and regular rhythm.      Heart sounds: Normal heart sounds.   Pulmonary:      Effort: Pulmonary effort is normal.      Breath sounds: Examination of the right-lower field reveals decreased breath sounds. Examination of the left-lower field reveals decreased breath sounds. Decreased breath sounds present. No wheezing, rhonchi or rales.   Abdominal:      General: Abdomen is flat. Bowel sounds are normal.      Palpations: Abdomen is soft.      Tenderness: There is no abdominal tenderness.   Musculoskeletal:      Cervical back: Normal range of motion and neck supple.   Lymphadenopathy:      Head:      Right side of head: No submental, submandibular, tonsillar, preauricular, posterior auricular or occipital adenopathy.      Left side of head: No submental, submandibular, tonsillar, preauricular, posterior auricular or occipital adenopathy.      Cervical: No cervical adenopathy.   Skin:     General: Skin is warm and dry.   Neurological:      Mental Status: He is alert and oriented to person, place, and time.   Psychiatric:         Behavior: Behavior normal.         Thought Content: Thought content normal.                 Assessment and Plan    Diagnoses and all orders  for this visit:    1. Bronchitis (Primary)  -     methylPREDNISolone (MEDROL) 4 MG dose pack; Take as directed on package instructions.  Dispense: 1 tablet; Refill: 0    2. Subacute frontal sinusitis  -     methylPREDNISolone (MEDROL) 4 MG dose pack; Take as directed on package instructions.  Dispense: 1 tablet; Refill: 0      I spent 20 minutes caring for Ricardo on this date of service. This time includes time spent by me in the following activities:preparing for the visit, obtaining and/or reviewing a separately obtained history, performing a medically appropriate examination and/or evaluation , counseling and educating the patient/family/caregiver, ordering medications, tests, or procedures, documenting information in the medical record, independently interpreting results and communicating that information with the patient/family/caregiver and care coordination       Follow Up     Return if symptoms worsen or fail to improve.     We will give him a steroid shot in the office today and told him to wait 2 days before taking Medrol dose pack.  If no improvement can take back as prescribed.  Advised him if shortness of breath worsened chest congestion improved creased, fever, chills would need to be seen in the ER over the weekend or call our office on Monday for ordering of chest x-ray.    Patient will start taking Mucinex twice a day for the next 2 weeks.  Advised him to increase his hydration.   Patient was given instructions and counseling regarding his condition or for health maintenance advice. Please see specific information pulled into the AVS if appropriate.

## 2021-07-20 ENCOUNTER — OFFICE VISIT (OUTPATIENT)
Dept: ONCOLOGY | Facility: CLINIC | Age: 71
End: 2021-07-20

## 2021-07-20 ENCOUNTER — APPOINTMENT (OUTPATIENT)
Dept: LAB | Facility: HOSPITAL | Age: 71
End: 2021-07-20

## 2021-07-20 ENCOUNTER — INFUSION (OUTPATIENT)
Dept: ONCOLOGY | Facility: HOSPITAL | Age: 71
End: 2021-07-20

## 2021-07-20 ENCOUNTER — LAB (OUTPATIENT)
Dept: LAB | Facility: HOSPITAL | Age: 71
End: 2021-07-20

## 2021-07-20 VITALS
TEMPERATURE: 97.8 F | RESPIRATION RATE: 18 BRPM | HEIGHT: 72 IN | WEIGHT: 166 LBS | BODY MASS INDEX: 22.48 KG/M2 | SYSTOLIC BLOOD PRESSURE: 188 MMHG | OXYGEN SATURATION: 99 % | DIASTOLIC BLOOD PRESSURE: 62 MMHG | HEART RATE: 58 BPM

## 2021-07-20 VITALS
HEART RATE: 60 BPM | BODY MASS INDEX: 27.76 KG/M2 | DIASTOLIC BLOOD PRESSURE: 72 MMHG | SYSTOLIC BLOOD PRESSURE: 130 MMHG | TEMPERATURE: 97.5 F | OXYGEN SATURATION: 93 % | WEIGHT: 166.6 LBS | RESPIRATION RATE: 18 BRPM | HEIGHT: 65 IN

## 2021-07-20 DIAGNOSIS — D63.1 ANEMIA DUE TO CHRONIC KIDNEY DISEASE, UNSPECIFIED CKD STAGE: Primary | ICD-10-CM

## 2021-07-20 DIAGNOSIS — E61.1 LOW IRON: ICD-10-CM

## 2021-07-20 DIAGNOSIS — N18.9 ANEMIA DUE TO CHRONIC KIDNEY DISEASE, UNSPECIFIED CKD STAGE: Primary | ICD-10-CM

## 2021-07-20 DIAGNOSIS — E61.0 COPPER DEFICIENCY: ICD-10-CM

## 2021-07-20 DIAGNOSIS — C91.10 CLL (CHRONIC LYMPHOCYTIC LEUKEMIA) (HCC): ICD-10-CM

## 2021-07-20 DIAGNOSIS — D63.1 ANEMIA DUE TO STAGE 4 CHRONIC KIDNEY DISEASE (HCC): ICD-10-CM

## 2021-07-20 DIAGNOSIS — D63.1 ANEMIA DUE TO CHRONIC KIDNEY DISEASE, UNSPECIFIED CKD STAGE: ICD-10-CM

## 2021-07-20 DIAGNOSIS — D64.9 ANEMIA, UNSPECIFIED TYPE: ICD-10-CM

## 2021-07-20 DIAGNOSIS — N18.4 CRD (CHRONIC RENAL DISEASE), STAGE IV (HCC): ICD-10-CM

## 2021-07-20 DIAGNOSIS — N18.9 ANEMIA DUE TO CHRONIC KIDNEY DISEASE, UNSPECIFIED CKD STAGE: ICD-10-CM

## 2021-07-20 DIAGNOSIS — N18.4 ANEMIA DUE TO STAGE 4 CHRONIC KIDNEY DISEASE (HCC): ICD-10-CM

## 2021-07-20 LAB
ALBUMIN SERPL-MCNC: 4.4 G/DL (ref 3.5–5.2)
ALBUMIN/GLOB SERPL: 1.4 G/DL
ALP SERPL-CCNC: 201 U/L (ref 39–117)
ALT SERPL W P-5'-P-CCNC: 19 U/L (ref 1–41)
ANION GAP SERPL CALCULATED.3IONS-SCNC: 13 MMOL/L (ref 5–15)
AST SERPL-CCNC: 16 U/L (ref 1–40)
B2 MICROGLOB SERPL-MCNC: 7.2 MG/L (ref 0.8–2.2)
BASOPHILS # BLD AUTO: 0.06 10*3/MM3 (ref 0–0.2)
BASOPHILS NFR BLD AUTO: 0.5 % (ref 0–1.5)
BILIRUB SERPL-MCNC: 0.3 MG/DL (ref 0–1.2)
BUN SERPL-MCNC: 58 MG/DL (ref 8–23)
BUN/CREAT SERPL: 23.8 (ref 7–25)
CALCIUM SPEC-SCNC: 9.2 MG/DL (ref 8.6–10.5)
CHLORIDE SERPL-SCNC: 105 MMOL/L (ref 98–107)
CO2 SERPL-SCNC: 19 MMOL/L (ref 22–29)
CREAT SERPL-MCNC: 2.44 MG/DL (ref 0.76–1.27)
DEPRECATED RDW RBC AUTO: 69 FL (ref 37–54)
EOSINOPHIL # BLD AUTO: 0.06 10*3/MM3 (ref 0–0.4)
EOSINOPHIL NFR BLD AUTO: 0.5 % (ref 0.3–6.2)
ERYTHROCYTE [DISTWIDTH] IN BLOOD BY AUTOMATED COUNT: 20.7 % (ref 12.3–15.4)
FERRITIN SERPL-MCNC: 159.2 NG/ML (ref 30–400)
GFR SERPL CREATININE-BSD FRML MDRD: 26 ML/MIN/1.73
GLOBULIN UR ELPH-MCNC: 3.2 GM/DL
GLUCOSE SERPL-MCNC: 109 MG/DL (ref 65–99)
HCT VFR BLD AUTO: 37.4 % (ref 37.5–51)
HGB BLD-MCNC: 11.3 G/DL (ref 13–17.7)
IMM GRANULOCYTES # BLD AUTO: 0.19 10*3/MM3 (ref 0–0.05)
IMM GRANULOCYTES NFR BLD AUTO: 1.7 % (ref 0–0.5)
IRON 24H UR-MRATE: 49 MCG/DL (ref 59–158)
IRON SATN MFR SERPL: 15 % (ref 20–50)
LYMPHOCYTES # BLD AUTO: 2.26 10*3/MM3 (ref 0.7–3.1)
LYMPHOCYTES NFR BLD AUTO: 20.6 % (ref 19.6–45.3)
MCH RBC QN AUTO: 28.3 PG (ref 26.6–33)
MCHC RBC AUTO-ENTMCNC: 30.2 G/DL (ref 31.5–35.7)
MCV RBC AUTO: 93.7 FL (ref 79–97)
MONOCYTES # BLD AUTO: 1.03 10*3/MM3 (ref 0.1–0.9)
MONOCYTES NFR BLD AUTO: 9.4 % (ref 5–12)
NEUTROPHILS NFR BLD AUTO: 67.3 % (ref 42.7–76)
NEUTROPHILS NFR BLD AUTO: 7.39 10*3/MM3 (ref 1.7–7)
NRBC BLD AUTO-RTO: 0 /100 WBC (ref 0–0.2)
PLATELET # BLD AUTO: 173 10*3/MM3 (ref 140–450)
PMV BLD AUTO: 10.4 FL (ref 6–12)
POTASSIUM SERPL-SCNC: 5.4 MMOL/L (ref 3.5–5.2)
PROT SERPL-MCNC: 7.6 G/DL (ref 6–8.5)
RBC # BLD AUTO: 3.99 10*6/MM3 (ref 4.14–5.8)
SODIUM SERPL-SCNC: 137 MMOL/L (ref 136–145)
TIBC SERPL-MCNC: 337 MCG/DL (ref 298–536)
TRANSFERRIN SERPL-MCNC: 226 MG/DL (ref 200–360)
URATE SERPL-MCNC: 9.2 MG/DL (ref 3.4–7)
WBC # BLD AUTO: 10.99 10*3/MM3 (ref 3.4–10.8)

## 2021-07-20 PROCEDURE — 99214 OFFICE O/P EST MOD 30 MIN: CPT | Performed by: INTERNAL MEDICINE

## 2021-07-20 PROCEDURE — 83540 ASSAY OF IRON: CPT

## 2021-07-20 PROCEDURE — 82728 ASSAY OF FERRITIN: CPT

## 2021-07-20 PROCEDURE — 84466 ASSAY OF TRANSFERRIN: CPT

## 2021-07-20 PROCEDURE — 86334 IMMUNOFIX E-PHORESIS SERUM: CPT

## 2021-07-20 PROCEDURE — 82232 ASSAY OF BETA-2 PROTEIN: CPT

## 2021-07-20 PROCEDURE — 84550 ASSAY OF BLOOD/URIC ACID: CPT

## 2021-07-20 PROCEDURE — 80053 COMPREHEN METABOLIC PANEL: CPT

## 2021-07-20 PROCEDURE — G0463 HOSPITAL OUTPT CLINIC VISIT: HCPCS

## 2021-07-20 PROCEDURE — 85025 COMPLETE CBC W/AUTO DIFF WBC: CPT

## 2021-07-20 PROCEDURE — 36415 COLL VENOUS BLD VENIPUNCTURE: CPT

## 2021-07-20 PROCEDURE — 82784 ASSAY IGA/IGD/IGG/IGM EACH: CPT

## 2021-07-20 NOTE — PROGRESS NOTES
[id number] Contacted Kimber JORDAN with MD office r/t parameters on procrit. Not indicated if Hgb above 10 and Hct above 30. Pt does not need injection today. TRIXIE Abarca RN

## 2021-07-21 ENCOUNTER — TELEPHONE (OUTPATIENT)
Dept: INTERNAL MEDICINE | Facility: CLINIC | Age: 71
End: 2021-07-21

## 2021-07-21 ENCOUNTER — OFFICE VISIT (OUTPATIENT)
Dept: INTERNAL MEDICINE | Facility: CLINIC | Age: 71
End: 2021-07-21

## 2021-07-21 ENCOUNTER — HOSPITAL ENCOUNTER (OUTPATIENT)
Dept: GENERAL RADIOLOGY | Facility: HOSPITAL | Age: 71
Discharge: HOME | End: 2021-07-21
Admitting: NURSE PRACTITIONER

## 2021-07-21 VITALS
HEIGHT: 72 IN | TEMPERATURE: 98.6 F | OXYGEN SATURATION: 99 % | HEART RATE: 65 BPM | SYSTOLIC BLOOD PRESSURE: 170 MMHG | BODY MASS INDEX: 22.51 KG/M2 | DIASTOLIC BLOOD PRESSURE: 70 MMHG

## 2021-07-21 DIAGNOSIS — R05.8 PRODUCTIVE COUGH: ICD-10-CM

## 2021-07-21 DIAGNOSIS — R09.89 CHEST CONGESTION: Primary | ICD-10-CM

## 2021-07-21 DIAGNOSIS — R09.89 CHEST CONGESTION: ICD-10-CM

## 2021-07-21 DIAGNOSIS — I27.20 PULMONARY HYPERTENSION (HCC): ICD-10-CM

## 2021-07-21 LAB
IGA SERPL-MCNC: 311 MG/DL (ref 61–437)
IGG SERPL-MCNC: 1522 MG/DL (ref 603–1613)
IGM SERPL-MCNC: 108 MG/DL (ref 15–143)
PROT PATTERN SERPL IFE-IMP: NORMAL

## 2021-07-21 PROCEDURE — 99213 OFFICE O/P EST LOW 20 MIN: CPT | Performed by: NURSE PRACTITIONER

## 2021-07-21 PROCEDURE — 71046 X-RAY EXAM CHEST 2 VIEWS: CPT

## 2021-07-21 RX ORDER — METHYLPREDNISOLONE 4 MG/1
TABLET ORAL
Qty: 1 TABLET | Refills: 0 | Status: SHIPPED | OUTPATIENT
Start: 2021-07-21 | End: 2021-07-28

## 2021-07-21 RX ORDER — AZITHROMYCIN 250 MG/1
TABLET, FILM COATED ORAL
Qty: 6 TABLET | Refills: 0 | Status: SHIPPED | OUTPATIENT
Start: 2021-07-21 | End: 2021-07-28

## 2021-07-21 NOTE — PROGRESS NOTES
"Chief Complaint  Cough (productive(yellow) finished MDP this morning, congestion )    Subjective     {Problem List  Visit Diagnosis   Encounters  Notes  Medications  Labs  Result Review Imaging  Media :23}     Ricardo Hugo presents to [facility]   returns to the office with chest congestion and productive cough. He reports yellow green suputm, moderate amount every hour. He reports symptoms started to improve with steroid, but after completion of steroid yesterday, chest congestion is back. Patient reports chest congestion is aggravated by most allergens. Patient reported symtpoms initially started after mowing the grass 2 weeks ago. He has completed allergy testing in the past and was told he was \"allergic to everything\". He also reports \" 2 round of medrol dose pack and I am better\". He denies chest tightness, chest pain, palpitations, shortness of breath, exercise intolerance, or lower extremity swelling. He is followed by Dr. Amador who diagnosed him with    Cough  This is a recurrent problem. The current episode started 1 to 4 weeks ago. The problem has been waxing and waning. The problem occurs every few minutes. The cough is productive of purulent sputum. Associated symptoms include nasal congestion and postnasal drip. Pertinent negatives include no chest pain, chills, ear congestion, sore throat, shortness of breath or wheezing. The symptoms are aggravated by dust, animals, fumes and pollens. Risk factors for lung disease include smoking/tobacco exposure, animal exposure and occupational exposure. He has tried a beta-agonist inhaler, oral steroids and rest for the symptoms. The treatment provided mild relief. His past medical history is significant for environmental allergies. pulmonary hypertension       Objective   Vital Signs:   /70 (BP Location: Left arm)   Pulse 65   Temp 98.6 °F (37 °C)   Ht 182.9 cm (72\")   SpO2 99%   BMI 22.51 kg/m²   "   Physical Exam  Vitals and nursing note reviewed.   Constitutional:       Appearance: Normal appearance. He is well-developed, well-groomed and normal weight.   HENT:      Head: Normocephalic and atraumatic.      Right Ear: Hearing, tympanic membrane, ear canal and external ear normal.      Left Ear: Hearing, tympanic membrane, ear canal and external ear normal.      Nose: Nose normal.      Mouth/Throat:      Lips: Pink.      Mouth: Mucous membranes are moist.      Pharynx: Oropharynx is clear. Uvula midline.   Eyes:      General: Lids are normal. Lids are everted, no foreign bodies appreciated. Vision grossly intact.      Extraocular Movements: Extraocular movements intact.      Conjunctiva/sclera: Conjunctivae normal.      Pupils: Pupils are equal, round, and reactive to light.   Neck:      Thyroid: No thyromegaly.   Cardiovascular:      Rate and Rhythm: Normal rate and regular rhythm.      Pulses: Normal pulses.      Heart sounds: Normal heart sounds.   Pulmonary:      Effort: Pulmonary effort is normal. No tachypnea, accessory muscle usage or respiratory distress.      Breath sounds: Examination of the right-upper field reveals wheezing and rhonchi. Examination of the left-upper field reveals wheezing and rhonchi. Examination of the right-lower field reveals decreased breath sounds and rhonchi. Examination of the left-lower field reveals decreased breath sounds and rhonchi. Decreased breath sounds, wheezing and rhonchi present.   Abdominal:      General: Abdomen is flat. Bowel sounds are normal.      Palpations: Abdomen is soft.   Musculoskeletal:      Cervical back: Normal range of motion and neck supple.      Right lower leg: No edema.      Left lower leg: No edema.   Lymphadenopathy:      Cervical: No cervical adenopathy.   Skin:     General: Skin is warm and dry.      Capillary Refill: Capillary refill takes less than 2 seconds.      Coloration: Skin is not cyanotic.   Neurological:      Mental Status: He  is alert and oriented to person, place, and time.   Psychiatric:         Behavior: Behavior normal. Behavior is cooperative.         Thought Content: Thought content normal.        Result Review :   The following data was reviewed by: STERLING Lincoln on 2021:  Common labs    Common Labsle 21      1029 1029 1029   Glucose    109 (A)    BUN    58 (A)    Creatinine    2.44 (A)    eGFR Non African Am    26 (A)    Sodium    137    Potassium    5.4 (A)    Chloride    105    Calcium    9.2    Albumin    4.40    Total Bilirubin    0.3    Alkaline Phosphatase    201 (A)    AST (SGOT)    16    ALT (SGPT)    19    WBC 7.91 5.84 10.99 (A)     Hemoglobin 10.2 (A) 9.2 (A) 11.3 (A)     Hematocrit 33.8 (A) 30.3 (A) 37.4 (A)     Platelets 158 129 (A) 173     Uric Acid     9.2 (A)   (A) Abnormal value            Data reviewed:   Pre Drug % Predicted    FVC: 71%   FEV1: 60%   FEF 25-75%: 32%   FEV1/FVC: 65.82%   T%   RV: 140%   DLCO: 36%   D/VAsb: 44%     Interpretation   Spirometry   Spirometry shows moderate obstruction. There is reduced midflow suggesting small airway/airflow obstruction.   Review of FVL curve   Patient's effort is normal.   Lung Volume Measurements  Measurements show elevated residual volume consistent with gas trapping.   Diffusion Capacity  The patient's diffusion capacity is severely reduced.  Diffusion capacity is moderately reduced when corrected for alveolar volume.          Assessment and Plan    Diagnoses and all orders for this visit:    1. Chest congestion (Primary)  -     XR Chest PA & Lateral; Future  -     azithromycin (Zithromax Z-[place]) 250 MG tablet; Take 2 tablets by mouth the first day, then 1 tablet daily for 4 days.  Dispense: 6 tablet; Refill: 0  -     methylPREDNISolone (MEDROL) 4 MG dose pack; Take as directed on package instructions.  Dispense: 1 tablet; Refill: 0    2. Productive cough  -     XR Chest PA & Lateral; Future  -      azithromycin (Zithromax Z-[place]) 250 MG tablet; Take 2 tablets by mouth the first day, then 1 tablet daily for 4 days.  Dispense: 6 tablet; Refill: 0    3. Pulmonary hypertension (CMS/HCC)      I spent 20 minutes caring for Ricardo on this date of service. This time includes time spent by me in the following activities:preparing for the visit, reviewing tests, obtaining and/or reviewing a separately obtained history, performing a medically appropriate examination and/or evaluation , counseling and educating the patient/family/caregiver, ordering medications, tests, or procedures, documenting information in the medical record, independently interpreting results and communicating that information with the patient/family/caregiver and care coordination     Follow Up     Return if symptoms worsen or fail to improve.     I did review Dr. Omalley note and assessed PFT. Patient has productive cough with chest congestion and I am worried he has pneumonia at visit today. However, this could also be his baseline related to exam findings are similar to visit in May with Dr. Omalley. Will complete chest xray today to rule out pneumonia and possible need for dual antibiotic therapy. Will start patient on additional steroid pack, but advised the side effects and recent high blood pressure. Rechecked blood pressure in the office and improved to 151/81, 71. He denies cardiac symptoms at this time.     Patient was given instructions and counseling regarding his condition or for health maintenance advice. Please see specific information pulled into the AVS if appropriate.

## 2021-07-21 NOTE — PATIENT INSTRUCTIONS
Pulmonary Hypertension  Pulmonary hypertension is a long-term (chronic) condition in which there is high blood pressure in the arteries in the lungs (pulmonary arteries). This condition occurs when pulmonary arteries become narrow and tight, making it harder for blood to flow through the lungs. This in turn makes the heart work harder to pump blood through the lungs, making it harder for you to breathe.  Over time, pulmonary hypertension can weaken and damage the heart muscle, specifically the right side of the heart. Pulmonary hypertension is a serious condition that can be life-threatening.  What are the causes?  This condition may be caused by different medical conditions. It can be categorized by cause into five groups:  · Group 1: Pulmonary hypertension that is caused by abnormal growth of small blood vessels in the lungs (pulmonary arterial hypertension). The abnormal blood vessel growth may have no known cause, or it may be:  ? Passed from parent to child (hereditary).  ? Caused by another disease, such as a connective tissue disease (including lupus or scleroderma), congenital heart disease, liver disease, or HIV.  ? Caused by certain medicines or poisons (toxins).  · Group 2: Pulmonary hypertension that is caused by weakness of the left chamber of the heart (left ventricle) or heart valve disease.  · Group 3: Pulmonary hypertension that is caused by lung disease or low oxygen levels. Causes in this group include:  ? Emphysema or chronic obstructive pulmonary disease (COPD).  ? Untreated sleep apnea.  ? Pulmonary fibrosis.  ? Long-term exposure to high altitudes in certain people who may already be at higher risk for pulmonary hypertension.  · Group 4: Pulmonary hypertension that is caused by blood clots in the lungs (pulmonary emboli).  · Group 5: Other causes of pulmonary hypertension, such as sickle cell anemia, sarcoidosis, tumors pressing on the pulmonary arteries, and various other diseases.  What are  the signs or symptoms?  Symptoms of this condition include:  · Shortness of breath. You may notice shortness of breath with:  ? Activity, such as walking.  ? Minimal activity, such as getting dressed.  ? No activity, like when you are sitting still.  · A cough. Sometimes, bloody mucus from the lungs may be coughed up (hemoptysis).  · Tiredness and fatigue.  · Dizziness, lightheadedness, or fainting, especially with physical activity.  · Rapid heartbeat, or feeling your heart flutter or skip a beat (palpitations).  · Veins in the neck getting larger.  · Swelling of the lower legs, abdomen, or both.  · Bluish color of the lips and fingertips.  · Chest pain or tightness in the chest.  · Abdominal pain, especially in the upper abdomen.  How is this diagnosed?  This condition may be diagnosed based on one or more of the following tests:  · Chest X-ray.  · Blood tests.  · CT scan.  · Pulmonary function test. This test measures how much air your lungs can hold. It also tests how well air moves in and out of your lungs.  · 6-minute walk test. This tests how severe your condition is in relation to your activity levels.  · Electrocardiogram (ECG). This test records the electrical impulses of the heart.  · Echocardiogram. This test uses sound waves (ultrasound) to produce an image of the heart.  · Cardiac catheterization. This is a procedure in which a thin tube (catheter) is passed into the pulmonary artery and used to test the pressure in your pulmonary artery and the right side of your heart.  · Lung biopsy. This involves having a procedure to remove a small sample of lung tissue for testing. This may help determine an underlying cause of your pulmonary hypertension.  How is this treated?  There is no cure for this condition, but treatment can help to relieve symptoms and slow the progress of the condition. Treatment may include:  · Cardiac rehabilitation. This is a treatment program that includes exercise training,  education, and counseling to help you get stronger and return to an active lifestyle.  · Oxygen therapy.  · Medicines that:  ? Lower blood pressure.  ? Relax (dilate) the pulmonary blood vessels.  ? Help the heart beat more efficiently and pump more blood.  ? Help the body get rid of extra fluid (diuretics).  ? Thin the blood in order to prevent blood clots in the lungs.  · Lung surgery to relieve pressure on the heart, for severe cases that do not respond to medical treatment.  · Heart-lung transplant, or lung transplant. This may be done in very severe cases.  Follow these instructions at home:  Eating and drinking    · Eat a healthy diet that includes plenty of fresh fruits and vegetables, whole grains, and beans.  · Limit your salt (sodium) intake to less than 2,300 mg a day.  Lifestyle  · Do not use any products that contain nicotine or tobacco, such as cigarettes and e-cigarettes. If you need help quitting, ask your health care provider.  · Avoid secondhand smoke.  Activity  · Get plenty of rest.  · Exercise as directed. Talk with your health care provider about what type of exercise is safe for you.  · Avoid hot tubs and saunas.  · Avoid high altitudes.  General instructions  · Take over-the-counter and prescription medicines only as told by your health care provider. Do not change or stop medicines without checking with your health care provider.  · Stay up to date on your vaccines, especially yearly flu (influenza) and pneumonia vaccines.  · If you are a woman of child-bearing age, avoid becoming pregnant. Talk with your health care provider about birth control.  · Consider ways to get support for anxiety and stress of living with pulmonary hypertension. Talk with your health care provider about support groups and online resources.  · Use oxygen therapy at home as directed.  · Keep track of your weight. Weight gain could be a sign that your condition is getting worse.  · Keep all follow-up visits as told by  your health care provider. This is important.  Contact a health care provider if:  · Your cough gets worse.  · You have more shortness of breath than usual, or you start to have trouble doing activities that you could do before.  · You need to use medicines or oxygen more frequently or in higher dosages than usual.  Get help right away if:  · You have severe shortness of breath.  · You have chest pain or pressure.  · You cough up blood.  · You have swelling of your feet or legs that gets worse.  · You have rapid weight gain over a period of 1-2 days.  · Your medicines or oxygen do not provide relief.  Summary  · Pulmonary hypertension is a chronic condition in which there is high blood pressure in the arteries in the lungs (pulmonary arteries).  · Pulmonary hypertension is a serious condition that can be life-threatening. It can be caused by a variety of illnesses.  · Treatment may involve taking medicines and using oxygen therapy. Severe cases may require surgery or a transplant.  This information is not intended to replace advice given to you by your health care provider. Make sure you discuss any questions you have with your health care provider.  Document Revised: 04/14/2021 Document Reviewed: 03/13/2018  Elsevier Patient Education © 2021 [employer]

## 2021-07-21 NOTE — TELEPHONE ENCOUNTER
Caller: Ricardo Hugo    Relationship: Self    Best call back number:     What medication are you requesting: ANTIBIOTIC    What are your current symptoms: COUGH, CONGESTION, RUNNY NOSE    How long have you been experiencing symptoms: SINCE LAST WED.    Have you had these symptoms before:    [x] Yes  [] No    Have you been treated for these symptoms before:   [x] Yes  [] No    If a prescription is needed, what is your preferred pharmacy and phone number:  [id number] - [place], [place] - [address] AT [facility] [address] - [phone] [facility] [phone]   [phone]    Additional notes:

## 2021-07-22 ENCOUNTER — TELEPHONE (OUTPATIENT)
Dept: INTERNAL MEDICINE | Facility: CLINIC | Age: 71
End: 2021-07-22

## 2021-07-22 NOTE — TELEPHONE ENCOUNTER
----- Message from STERLING Zambrano sent at 7/21/2021  3:59 PM CDT -----  No acute infection noted on imaging. Will have him complete medicine prescribed to help with symptoms. Pulmonary hypertension causing most of his complaints. Need to continue follow up with cardiologist and pulmonary.

## 2021-07-23 ENCOUNTER — APPOINTMENT (OUTPATIENT)
Dept: LAB | Facility: HOSPITAL | Age: 71
End: 2021-07-23

## 2021-07-26 ENCOUNTER — TELEPHONE (OUTPATIENT)
Dept: INTERNAL MEDICINE | Facility: CLINIC | Age: 71
End: 2021-07-26

## 2021-07-26 NOTE — TELEPHONE ENCOUNTER
Caller: Ricardo Hugo    Relationship: Self    Best call back number: [phone]    What medication are you requesting: Something to treat.    What are your current symptoms: Cough    How long have you been experiencing symptoms: 2 weeks    Have you had these symptoms before:    [x] Yes  [] No    Have you been treated for these symptoms before:   [x] Yes  [] No    If a prescription is needed, what is your preferred pharmacy and phone number:      [id number] - [place], [place] - [address] AT [facility] [address] [facility] [phone] [facility] [phone] [facility]     Additional notes:     He was given medication, but he states his symptoms never went away.

## 2021-07-27 ENCOUNTER — INFUSION (OUTPATIENT)
Dept: ONCOLOGY | Facility: HOSPITAL | Age: 71
End: 2021-07-27

## 2021-07-27 ENCOUNTER — LAB (OUTPATIENT)
Dept: LAB | Facility: HOSPITAL | Age: 71
End: 2021-07-27

## 2021-07-27 VITALS
HEART RATE: 54 BPM | OXYGEN SATURATION: 100 % | WEIGHT: 162 LBS | SYSTOLIC BLOOD PRESSURE: 193 MMHG | BODY MASS INDEX: 21.94 KG/M2 | RESPIRATION RATE: 16 BRPM | DIASTOLIC BLOOD PRESSURE: 60 MMHG | HEIGHT: 72 IN

## 2021-07-27 DIAGNOSIS — C91.10 CLL (CHRONIC LYMPHOCYTIC LEUKEMIA) (HCC): ICD-10-CM

## 2021-07-27 LAB
ALBUMIN SERPL-MCNC: 4.3 G/DL (ref 3.5–5.2)
ALBUMIN/GLOB SERPL: 1.5 G/DL
ALP SERPL-CCNC: 164 U/L (ref 39–117)
ALT SERPL W P-5'-P-CCNC: 27 U/L (ref 1–41)
ANION GAP SERPL CALCULATED.3IONS-SCNC: 12 MMOL/L (ref 5–15)
AST SERPL-CCNC: 18 U/L (ref 1–40)
BASOPHILS # BLD AUTO: 0.05 10*3/MM3 (ref 0–0.2)
BASOPHILS NFR BLD AUTO: 0.5 % (ref 0–1.5)
BILIRUB SERPL-MCNC: 0.3 MG/DL (ref 0–1.2)
BUN SERPL-MCNC: 70 MG/DL (ref 8–23)
BUN/CREAT SERPL: 27.2 (ref 7–25)
CALCIUM SPEC-SCNC: 8.5 MG/DL (ref 8.6–10.5)
CHLORIDE SERPL-SCNC: 107 MMOL/L (ref 98–107)
CO2 SERPL-SCNC: 18 MMOL/L (ref 22–29)
CREAT SERPL-MCNC: 2.57 MG/DL (ref 0.76–1.27)
DEPRECATED RDW RBC AUTO: 65.5 FL (ref 37–54)
EOSINOPHIL # BLD AUTO: 0.33 10*3/MM3 (ref 0–0.4)
EOSINOPHIL NFR BLD AUTO: 3.2 % (ref 0.3–6.2)
ERYTHROCYTE [DISTWIDTH] IN BLOOD BY AUTOMATED COUNT: 19.7 % (ref 12.3–15.4)
FERRITIN SERPL-MCNC: 159.6 NG/ML (ref 30–400)
GFR SERPL CREATININE-BSD FRML MDRD: 25 ML/MIN/1.73
GLOBULIN UR ELPH-MCNC: 2.9 GM/DL
GLUCOSE SERPL-MCNC: 94 MG/DL (ref 65–99)
HCT VFR BLD AUTO: 40.2 % (ref 37.5–51)
HGB BLD-MCNC: 12.6 G/DL (ref 13–17.7)
IMM GRANULOCYTES # BLD AUTO: 0.15 10*3/MM3 (ref 0–0.05)
IMM GRANULOCYTES NFR BLD AUTO: 1.4 % (ref 0–0.5)
IRON 24H UR-MRATE: 150 MCG/DL (ref 59–158)
IRON SATN MFR SERPL: 46 % (ref 20–50)
LYMPHOCYTES # BLD AUTO: 3.53 10*3/MM3 (ref 0.7–3.1)
LYMPHOCYTES NFR BLD AUTO: 33.8 % (ref 19.6–45.3)
MCH RBC QN AUTO: 28.7 PG (ref 26.6–33)
MCHC RBC AUTO-ENTMCNC: 31.3 G/DL (ref 31.5–35.7)
MCV RBC AUTO: 91.6 FL (ref 79–97)
MONOCYTES # BLD AUTO: 0.94 10*3/MM3 (ref 0.1–0.9)
MONOCYTES NFR BLD AUTO: 9 % (ref 5–12)
NEUTROPHILS NFR BLD AUTO: 5.43 10*3/MM3 (ref 1.7–7)
NEUTROPHILS NFR BLD AUTO: 52.1 % (ref 42.7–76)
NRBC BLD AUTO-RTO: 0 /100 WBC (ref 0–0.2)
PLATELET # BLD AUTO: 157 10*3/MM3 (ref 140–450)
PMV BLD AUTO: 10.1 FL (ref 6–12)
POTASSIUM SERPL-SCNC: 5.3 MMOL/L (ref 3.5–5.2)
PROT SERPL-MCNC: 7.2 G/DL (ref 6–8.5)
RBC # BLD AUTO: 4.39 10*6/MM3 (ref 4.14–5.8)
SODIUM SERPL-SCNC: 137 MMOL/L (ref 136–145)
TIBC SERPL-MCNC: 325 MCG/DL (ref 298–536)
TRANSFERRIN SERPL-MCNC: 218 MG/DL (ref 200–360)
URATE SERPL-MCNC: 9.5 MG/DL (ref 3.4–7)
WBC # BLD AUTO: 10.43 10*3/MM3 (ref 3.4–10.8)

## 2021-07-27 PROCEDURE — 36415 COLL VENOUS BLD VENIPUNCTURE: CPT

## 2021-07-27 PROCEDURE — G0463 HOSPITAL OUTPT CLINIC VISIT: HCPCS

## 2021-07-27 PROCEDURE — 84550 ASSAY OF BLOOD/URIC ACID: CPT

## 2021-07-27 PROCEDURE — 80053 COMPREHEN METABOLIC PANEL: CPT

## 2021-07-27 PROCEDURE — 82728 ASSAY OF FERRITIN: CPT

## 2021-07-27 PROCEDURE — 85025 COMPLETE CBC W/AUTO DIFF WBC: CPT

## 2021-07-27 PROCEDURE — 83540 ASSAY OF IRON: CPT

## 2021-07-27 PROCEDURE — 84466 ASSAY OF TRANSFERRIN: CPT

## 2021-07-27 NOTE — TELEPHONE ENCOUNTER
Called pt and informed and he says he would rather see Dr. Velasco, before he sees the lung doctor.  Offered appt today but he has another appt this afternoon, so appt was made for tomorrow.

## 2021-07-27 NOTE — TELEPHONE ENCOUNTER
Last exam he did not have infectious process, but chronic changes.He has now completed two round of steroid and 1 antibiotic without significant improvement. I would advise he be seen by Dr. Omalley in the Pulmonary group for second opinion.

## 2021-07-27 NOTE — PROGRESS NOTES
1340-Call placed to SAL Samano, Hem/Onc, reported hgb 12.6/hct 40.2, creat 2.57, GFR 25; can't really tell if he needs Retacrit per M.D. notes; per Sal Samano, do not give this week.

## 2021-07-28 ENCOUNTER — OFFICE VISIT (OUTPATIENT)
Dept: INTERNAL MEDICINE | Facility: CLINIC | Age: 71
End: 2021-07-28

## 2021-07-28 VITALS
WEIGHT: 164 LBS | OXYGEN SATURATION: 100 % | HEART RATE: 59 BPM | TEMPERATURE: 97.9 F | DIASTOLIC BLOOD PRESSURE: 68 MMHG | BODY MASS INDEX: 22.21 KG/M2 | SYSTOLIC BLOOD PRESSURE: 158 MMHG | HEIGHT: 72 IN

## 2021-07-28 DIAGNOSIS — I10 HYPERTENSION, BENIGN: ICD-10-CM

## 2021-07-28 DIAGNOSIS — K50.118 CROHN'S DISEASE OF LARGE INTESTINE WITH OTHER COMPLICATION (HCC): ICD-10-CM

## 2021-07-28 DIAGNOSIS — R05.3 CHRONIC COUGH: Primary | ICD-10-CM

## 2021-07-28 LAB
LAB AP CASE REPORT: NORMAL
LAB AP DIAGNOSIS COMMENT: NORMAL
LAB AP FLOW CYTOMETRY SUMMARY: NORMAL
PATH REPORT.FINAL DX SPEC: NORMAL
PATH REPORT.GROSS SPEC: NORMAL

## 2021-07-28 PROCEDURE — 99214 OFFICE O/P EST MOD 30 MIN: CPT | Performed by: INTERNAL MEDICINE

## 2021-07-28 RX ORDER — DOXYCYCLINE HYCLATE 100 MG/1
100 CAPSULE ORAL 2 TIMES DAILY
Qty: 20 CAPSULE | Refills: 0 | Status: SHIPPED | OUTPATIENT
Start: 2021-07-28 | End: 2021-08-26

## 2021-07-28 NOTE — PROGRESS NOTES
Subjective   Ricardo Hugo is a 71 y.o. male.   Chief Complaint   Patient presents with   • Cough     on going issue; saw Tahira on 07/21/2021       History of Present Illness patient has an ongoing cough states that he was seen by one of the nurse practitioners in the clinic and been treated he is better but not completely resolved he is wanting to try another round of antibiotics.  I did go back and reviewed his chest x-ray the chest x-ray did not show any evidence of a pneumonia tumor etc.    The following portions of the patient's history were reviewed and updated as appropriate: allergies, current medications, past family history, past medical history, past social history, past surgical history and problem list.    Review of Systems   Constitutional: Negative for activity change, appetite change, fatigue, fever, unexpected weight gain and unexpected weight loss.   HENT: Negative for swollen glands, trouble swallowing and voice change.    Eyes: Negative for blurred vision and visual disturbance.   Respiratory: Positive for cough. Negative for shortness of breath.    Cardiovascular: Negative for chest pain, palpitations and leg swelling.   Gastrointestinal: Negative for abdominal pain, constipation, diarrhea, nausea, vomiting and indigestion.   Endocrine: Negative for cold intolerance, heat intolerance, polydipsia and polyphagia.   Genitourinary: Negative for dysuria and frequency.   Musculoskeletal: Negative for arthralgias, back pain, joint swelling and neck pain.   Skin: Negative for color change, rash and skin lesions.   Neurological: Negative for dizziness, weakness, headache, memory problem and confusion.   Hematological: Does not bruise/bleed easily.   Psychiatric/Behavioral: Negative for agitation, hallucinations and suicidal ideas. The patient is not nervous/anxious.        Objective   Past Medical History:   Diagnosis Date   • 3-vessel CAD 8/11/2020   • Allergic rhinitis    • Anxiety disorder  4/27/2020   • Arthritis    • Asymmetrical sensorineural hearing loss 6/28/2017   • Atherosclerosis of native artery of both lower extremities with intermittent claudication (CMS/[facility]) 7/18/2019   • Avascular necrosis of femoral head, left (CMS/[facility]) 07/11/2020    right hip after surgery   • Carotid stenosis    • Chronic mucoid otitis media    • Chronic rhinitis    • Coronary artery disease     HEART BYPASS 2004   • Crohn's disease of large intestine with other complication (CMS/[facility]) 7/30/2020    Chronic diarrhea Colonoscopy July 2020 revealed mild patchy scattered hemosiderin staining with inflammation more so in rectosigmoid area.  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   • Iron deficiency anemia 7/14/2020   • Mixed hearing loss of left ear    • PAD (peripheral artery disease) (CMS/[facility]) 8/11/2020   • Perianal abscess    • Pernicious anemia 08/17/2020    took shots but never diagnosed with b12 deficiency   • Personal history of alcoholism (CMS/[facility]) 08/11/2020    quit drinking in 2013   • Prostatic hypertrophy 8/11/2020   • Sensorineural hearing loss    • Sepsis with acute renal failure (CMS/[facility]) 9/15/2020   • Shortness of breath 5/27/2021   • Tinnitus    • Ventricular tachycardia, nonsustained (CMS/[facility]) 7/14/2020   • Weight loss 7/11/2020      Past Surgical History:   Procedure Laterality Date   • ARTERY SURGERY  2021    right side on neck   • CAROTID ENDARTERECTOMY Right 5/10/2021    Procedure: RIGHT CAROTID ENDARTERECTOMY WITH EEG;  Surgeon: Gil Pineda DO;  Location: [facility] OR ;  Service: Vascular;  Laterality: Right;    • COLONOSCOPY N/A 7/2/2020    Procedure: COLONOSCOPY WITH ANESTHESIA;  Surgeon: Adrien Brewster MD;  Location:  [facility];  Service: Gastroenterology;  Laterality: N/A;  pre op: diarrhea  post op: polyps  PCP: Jeo Velasco MD   • COLONOSCOPY N/A 10/13/2020    Procedure: COLONOSCOPY WITH ANESTHESIA;  Surgeon: Adrien Brewster MD;  Location:  [facility];  Service: Gastroenterology;  Laterality: N/A;  Pre: Chronic Diarrhea, Crohn's  Post: [facility]  Dr. Neftali Velasco  CO2 Inflation Used   • CORONARY ARTERY BYPASS GRAFT  2003    x3   • EYE SURGERY Bilateral     catorac   • INCISION AND DRAINAGE PERIRECTAL ABSCESS N/A 3/3/2017    Procedure: INCISION AND DRAINAGE OF JEET ANAL ABSCESS;  Surgeon: Lynette Smith MD;  Location: [facility] OR;  Service:    • MYRINGOTOMY W/ TUBES Left 04/17/2017    06/10/2016   • TONSILLECTOMY     • TOTAL HIP ARTHROPLASTY Right 2006        Current Outpatient Medications:   •  albuterol sulfate  (90 Base) MCG/ACT inhaler, USE 2 INHALATIONS FOUR TIMES A DAY AS NEEDED, Disp: 20.1 g, Rfl: 3  •  amLODIPine (NORVASC) 10 MG tablet, Take 10 mg by mouth Daily., Disp: , Rfl:   •  aspirin (ASPIR) 81 MG EC tablet, Take 81 mg by mouth Daily., Disp: , Rfl:   •  Aspirin-Acetaminophen-Caffeine (EXCEDRIN EXTRA STRENGTH PO), Take 2 tablets by mouth Daily As Needed (HEADACHES)., Disp: , Rfl:   •  atorvastatin (LIPITOR) 10 MG tablet, Take 1 tablet by mouth Daily., Disp: 90 tablet, Rfl: 3  •  azelastine (ASTELIN) 0.1 % nasal spray, 1 spray into the nostril(s) as directed by provider 2 (Two) Times a Day. Use in each nostril as directed, Disp: 90 mL, Rfl: 3  •  cholestyramine (QUESTRAN) 4 g packet, Take 1 packet by mouth Daily., Disp: 90 packet, Rfl: 3  •  cloNIDine (CATAPRES) 0.2 MG tablet, TAKE 3 TABLETS DAILY, Disp: 270 tablet, Rfl: 3  •  clopidogrel (Plavix) 75 MG tablet, Take 1 tablet by mouth Daily., Disp: 90 tablet, Rfl: 3  •  Copper Gluconate 2 MG tablet, Take 2 mg by mouth Daily., Disp: 30  tablet, Rfl: 6  •  desoximetasone (TOPICORT) 0.25 % cream, APPLY TO THE AFFECTED AREA TWICE A DAY AS NEEDED, Disp: 30 g, Rfl: 3  •  diphenhydrAMINE (Benadryl Allergy) 25 mg capsule, Take 25 mg by mouth Every 6 (Six) Hours As Needed for Itching., Disp: , Rfl:   •  fexofenadine (ALLEGRA) 180 MG tablet, Take 180 mg by mouth Daily., Disp: , Rfl:   •  fluticasone (FLONASE) 50 MCG/ACT nasal spray, 2 sprays into the nostril(s) as directed by provider Daily As Needed for Rhinitis., Disp: , Rfl:   •  levothyroxine (Synthroid) 75 MCG tablet, Take 1 tablet by mouth Daily., Disp: 90 tablet, Rfl: 3  •  magnesium chloride ER 64 MG DR tablet, Take 143 mg by mouth Daily., Disp: , Rfl:   •  Melatonin 10 MG capsule, Take 2 capsules by mouth Every Night., Disp: , Rfl:   •  mesalamine (APRISO) 0.375 g 24 hr capsule, Take 4 capsules by mouth Daily., Disp: 360 capsule, Rfl: 3  •  metoprolol succinate XL (TOPROL-XL) 25 MG 24 hr tablet, Take 1 tablet by mouth Daily., Disp: 90 tablet, Rfl: 3  •  Multiple Vitamins-Minerals (PRESERVISION/LUTEIN) capsule, Take 1 capsule by mouth 2 (two) times a day., Disp: , Rfl:   •  omeprazole (priLOSEC) 20 MG capsule, Take 1 capsule by mouth Daily., Disp: 90 capsule, Rfl: 3  •  potassium chloride 10 MEQ CR tablet, Take 1 tablet by mouth 2 (Two) Times a Day., Disp: 180 tablet, Rfl: 3  •  sodium bicarbonate 650 MG tablet, Take 1 tablet by mouth 2 (Two) Times a Day., Disp: 180 tablet, Rfl: 3  •  vitamin D (ERGOCALCIFEROL) 1.25 MG (96145 UT) capsule capsule, Take 1 capsule by mouth 1 (One) Time Per Week., Disp: 5 capsule, Rfl: 11  •  doxycycline (Vibramycin) 100 MG capsule, Take 1 capsule by mouth 2 (Two) Times a Day., Disp: 20 capsule, Rfl: 0  •  Fluticasone Furoate-Vilanterol (Breo Ellipta) 200-25 MCG/INH inhaler, Inhale 1 puff Daily for 14 days., Disp: 1 each, Rfl: 0     Vitals:    07/28/21 0922   BP: 158/68   Pulse: 59   Temp: 97.9 °F (36.6 °C)   SpO2: 100%         07/28/21 0922   Weight: 74.4 kg (164 lb)          Physical Exam  Vitals and nursing note reviewed.   Constitutional:       General: He is not in acute distress.     Appearance: Normal appearance. He is well-developed.   HENT:      Head: Normocephalic and atraumatic.      Right Ear: External ear normal.      Left Ear: External ear normal.      Nose: Nose normal.   Eyes:      Extraocular Movements: Extraocular movements intact.      Conjunctiva/sclera: Conjunctivae normal.      Pupils: Pupils are equal, round, and reactive to light.   Cardiovascular:      Rate and Rhythm: Normal rate and regular rhythm.      Pulses: Normal pulses.      Heart sounds: Normal heart sounds.   Pulmonary:      Effort: Pulmonary effort is normal.      Breath sounds: Normal breath sounds.   Abdominal:      General: Bowel sounds are normal.      Palpations: Abdomen is soft.   Musculoskeletal:         General: Normal range of motion.      Cervical back: Normal range of motion and neck supple. No rigidity. No muscular tenderness.   Skin:     General: Skin is warm and dry.   Neurological:      General: No focal deficit present.      Mental Status: He is alert and oriented to person, place, and time.   Psychiatric:         Mood and Affect: Mood normal.         Behavior: Behavior normal.               Assessment/Plan   Diagnoses and all orders for this visit:    1. Chronic cough (Primary)    2. Hypertension, benign    3. Crohn's disease of large intestine with other complication (CMS/[facility])    Other orders  -     doxycycline (Vibramycin) 100 MG capsule; Take 1 capsule by mouth 2 (Two) Times a Day.  Dispense: 20 capsule; Refill: 0        Patient wanted to try another round of antibiotics and since he is much improved from a azithromycin we will switch him to doxycycline I suspect a lot of his problem is COPD with exacerbation.  Mildly concerned about continued antibiotics with the possibility of C. difficile colitis on top of his already existing inflammatory colitis.

## 2021-08-02 RX ORDER — AMLODIPINE BESYLATE 10 MG/1
TABLET ORAL
Qty: 90 TABLET | Refills: 3 | Status: SHIPPED | OUTPATIENT
Start: 2021-08-02 | End: 2022-07-25 | Stop reason: SDUPTHER

## 2021-08-03 ENCOUNTER — APPOINTMENT (OUTPATIENT)
Dept: ONCOLOGY | Facility: HOSPITAL | Age: 71
End: 2021-08-03

## 2021-08-03 ENCOUNTER — OFFICE VISIT (OUTPATIENT)
Dept: ONCOLOGY | Facility: CLINIC | Age: 71
End: 2021-08-03

## 2021-08-03 ENCOUNTER — LAB (OUTPATIENT)
Dept: LAB | Facility: HOSPITAL | Age: 71
End: 2021-08-03

## 2021-08-03 VITALS
HEIGHT: 72 IN | DIASTOLIC BLOOD PRESSURE: 74 MMHG | BODY MASS INDEX: 21.79 KG/M2 | WEIGHT: 160.9 LBS | SYSTOLIC BLOOD PRESSURE: 142 MMHG | HEART RATE: 64 BPM | OXYGEN SATURATION: 95 % | RESPIRATION RATE: 18 BRPM | TEMPERATURE: 97.5 F

## 2021-08-03 DIAGNOSIS — C91.10 CLL (CHRONIC LYMPHOCYTIC LEUKEMIA) (HCC): Primary | ICD-10-CM

## 2021-08-03 DIAGNOSIS — N18.4 ANEMIA DUE TO STAGE 4 CHRONIC KIDNEY DISEASE (HCC): ICD-10-CM

## 2021-08-03 DIAGNOSIS — N18.4 CRD (CHRONIC RENAL DISEASE), STAGE IV (HCC): ICD-10-CM

## 2021-08-03 DIAGNOSIS — D63.1 ANEMIA DUE TO STAGE 4 CHRONIC KIDNEY DISEASE (HCC): ICD-10-CM

## 2021-08-03 LAB
BASOPHILS # BLD AUTO: 0.07 10*3/MM3 (ref 0–0.2)
BASOPHILS NFR BLD AUTO: 1 % (ref 0–1.5)
DEPRECATED RDW RBC AUTO: 63.7 FL (ref 37–54)
EOSINOPHIL # BLD AUTO: 0.34 10*3/MM3 (ref 0–0.4)
EOSINOPHIL NFR BLD AUTO: 4.9 % (ref 0.3–6.2)
ERYTHROCYTE [DISTWIDTH] IN BLOOD BY AUTOMATED COUNT: 18.8 % (ref 12.3–15.4)
HCT VFR BLD AUTO: 38.1 % (ref 37.5–51)
HGB BLD-MCNC: 11.6 G/DL (ref 13–17.7)
HOLD SPECIMEN: NORMAL
HOLD SPECIMEN: NORMAL
IMM GRANULOCYTES # BLD AUTO: 0.03 10*3/MM3 (ref 0–0.05)
IMM GRANULOCYTES NFR BLD AUTO: 0.4 % (ref 0–0.5)
LYMPHOCYTES # BLD AUTO: 1.99 10*3/MM3 (ref 0.7–3.1)
LYMPHOCYTES NFR BLD AUTO: 28.8 % (ref 19.6–45.3)
MCH RBC QN AUTO: 28 PG (ref 26.6–33)
MCHC RBC AUTO-ENTMCNC: 30.4 G/DL (ref 31.5–35.7)
MCV RBC AUTO: 91.8 FL (ref 79–97)
MONOCYTES # BLD AUTO: 0.57 10*3/MM3 (ref 0.1–0.9)
MONOCYTES NFR BLD AUTO: 8.3 % (ref 5–12)
NEUTROPHILS NFR BLD AUTO: 3.9 10*3/MM3 (ref 1.7–7)
NEUTROPHILS NFR BLD AUTO: 56.6 % (ref 42.7–76)
NRBC BLD AUTO-RTO: 0 /100 WBC (ref 0–0.2)
PLATELET # BLD AUTO: 134 10*3/MM3 (ref 140–450)
PMV BLD AUTO: 10.5 FL (ref 6–12)
RBC # BLD AUTO: 4.15 10*6/MM3 (ref 4.14–5.8)
WBC # BLD AUTO: 6.9 10*3/MM3 (ref 3.4–10.8)

## 2021-08-03 PROCEDURE — 99213 OFFICE O/P EST LOW 20 MIN: CPT | Performed by: INTERNAL MEDICINE

## 2021-08-03 PROCEDURE — 36415 COLL VENOUS BLD VENIPUNCTURE: CPT

## 2021-08-03 PROCEDURE — 85025 COMPLETE CBC W/AUTO DIFF WBC: CPT

## 2021-08-03 RX ORDER — DOXYCYCLINE HYCLATE 100 MG/1
100 CAPSULE ORAL 2 TIMES DAILY
COMMUNITY
Start: 2021-07-28 | End: 2021-08-06

## 2021-08-10 ENCOUNTER — LAB (OUTPATIENT)
Dept: LAB | Facility: HOSPITAL | Age: 71
End: 2021-08-10

## 2021-08-10 ENCOUNTER — INFUSION (OUTPATIENT)
Dept: ONCOLOGY | Facility: HOSPITAL | Age: 71
End: 2021-08-10

## 2021-08-10 VITALS
DIASTOLIC BLOOD PRESSURE: 52 MMHG | OXYGEN SATURATION: 100 % | RESPIRATION RATE: 16 BRPM | HEART RATE: 56 BPM | SYSTOLIC BLOOD PRESSURE: 157 MMHG | TEMPERATURE: 97.5 F

## 2021-08-10 DIAGNOSIS — C91.10 CLL (CHRONIC LYMPHOCYTIC LEUKEMIA) (HCC): Primary | ICD-10-CM

## 2021-08-10 DIAGNOSIS — N18.4 CRD (CHRONIC RENAL DISEASE), STAGE IV (HCC): ICD-10-CM

## 2021-08-10 DIAGNOSIS — D63.1 ANEMIA DUE TO STAGE 4 CHRONIC KIDNEY DISEASE (HCC): ICD-10-CM

## 2021-08-10 DIAGNOSIS — N18.4 ANEMIA DUE TO STAGE 4 CHRONIC KIDNEY DISEASE (HCC): ICD-10-CM

## 2021-08-10 LAB
BASOPHILS # BLD AUTO: 0.03 10*3/MM3 (ref 0–0.2)
BASOPHILS NFR BLD AUTO: 0.6 % (ref 0–1.5)
DEPRECATED RDW RBC AUTO: 60.1 FL (ref 37–54)
EOSINOPHIL # BLD AUTO: 0.33 10*3/MM3 (ref 0–0.4)
EOSINOPHIL NFR BLD AUTO: 6.3 % (ref 0.3–6.2)
ERYTHROCYTE [DISTWIDTH] IN BLOOD BY AUTOMATED COUNT: 18.4 % (ref 12.3–15.4)
HCT VFR BLD AUTO: 35.1 % (ref 37.5–51)
HGB BLD-MCNC: 11.4 G/DL (ref 13–17.7)
HOLD SPECIMEN: NORMAL
IMM GRANULOCYTES # BLD AUTO: 0.02 10*3/MM3 (ref 0–0.05)
IMM GRANULOCYTES NFR BLD AUTO: 0.4 % (ref 0–0.5)
LYMPHOCYTES # BLD AUTO: 1.68 10*3/MM3 (ref 0.7–3.1)
LYMPHOCYTES NFR BLD AUTO: 32.1 % (ref 19.6–45.3)
MCH RBC QN AUTO: 28.8 PG (ref 26.6–33)
MCHC RBC AUTO-ENTMCNC: 32.5 G/DL (ref 31.5–35.7)
MCV RBC AUTO: 88.6 FL (ref 79–97)
MONOCYTES # BLD AUTO: 0.5 10*3/MM3 (ref 0.1–0.9)
MONOCYTES NFR BLD AUTO: 9.6 % (ref 5–12)
NEUTROPHILS NFR BLD AUTO: 2.67 10*3/MM3 (ref 1.7–7)
NEUTROPHILS NFR BLD AUTO: 51 % (ref 42.7–76)
NRBC BLD AUTO-RTO: 0 /100 WBC (ref 0–0.2)
PLATELET # BLD AUTO: 93 10*3/MM3 (ref 140–450)
PMV BLD AUTO: 10.2 FL (ref 6–12)
RBC # BLD AUTO: 3.96 10*6/MM3 (ref 4.14–5.8)
WBC # BLD AUTO: 5.23 10*3/MM3 (ref 3.4–10.8)

## 2021-08-10 PROCEDURE — 85025 COMPLETE CBC W/AUTO DIFF WBC: CPT

## 2021-08-10 PROCEDURE — 36415 COLL VENOUS BLD VENIPUNCTURE: CPT

## 2021-08-10 PROCEDURE — G0463 HOSPITAL OUTPT CLINIC VISIT: HCPCS

## 2021-08-10 RX ORDER — FUROSEMIDE 20 MG/1
20 TABLET ORAL DAILY
Qty: 90 TABLET | Refills: 3 | OUTPATIENT
Start: 2021-08-10

## 2021-08-20 ENCOUNTER — OFFICE VISIT (OUTPATIENT)
Dept: INTERNAL MEDICINE | Facility: CLINIC | Age: 71
End: 2021-08-20

## 2021-08-20 ENCOUNTER — TELEPHONE (OUTPATIENT)
Dept: INTERNAL MEDICINE | Facility: CLINIC | Age: 71
End: 2021-08-20

## 2021-08-20 DIAGNOSIS — R05.8 PRODUCTIVE COUGH: ICD-10-CM

## 2021-08-20 DIAGNOSIS — J44.1 COPD WITH EXACERBATION (HCC): Primary | ICD-10-CM

## 2021-08-20 DIAGNOSIS — R50.9 FEVER, UNSPECIFIED FEVER CAUSE: ICD-10-CM

## 2021-08-20 PROCEDURE — 99442 PR PHYS/QHP TELEPHONE EVALUATION 11-20 MIN: CPT | Performed by: NURSE PRACTITIONER

## 2021-08-20 RX ORDER — CEFDINIR 300 MG/1
300 CAPSULE ORAL 2 TIMES DAILY
Qty: 14 CAPSULE | Refills: 0 | Status: SHIPPED | OUTPATIENT
Start: 2021-08-20 | End: 2021-08-26

## 2021-08-20 NOTE — PROGRESS NOTES
"Chief Complaint  Cough (Thinks allergies(ragweed)) and Shortness of Breath    Subjective          Ricardo Hugo presents to [facility]  You have chosen to receive care through a telephone visit. Do you consent to use a telephone visit for your medical care today? Yes    Mr. Hugo presents via telephone visit related to persistent cough and occasional shortness of breath.  He has pulmonary hypertension and over the last 2 months he has had recurrent upper respiratory and lower respiratory infections.  He states that he was mowing his lawn and after this he noticed he became more short of breath and chest.  States that this happens multiple times in the summer and spring months.  He usually does not have issues with in the fall or winter months.  He states that the only thing that usually helps prevent him from getting the upper or lower respiratory infection after exposures to allergens is an antibiotic course.  He states that he understands the risk of repeated antibiotic exposure for antibiotic resistance in the future.  He declines any steroids at this time for these concerns about his blood pressure.  He reports intermittent shortness of breath, but admits that this is a common occurrence.  He does report that he might be \"just a little more short of breath than usual \".  He denies excessive fatigue,  chills, productive cough, or chest pain palpitations. Did have a mild 99 temp 2 days ago, but improved without tylenol. He states that he is not checking his blood pressure every day but when he does it is usually 140 150/70 80s.      Objective   Vital Signs:   There were no vitals taken for this visit.        Result Review :     CMP    CMP 7/6/21 7/20/21 7/27/21   Glucose 109 (A) 109 (A) 94   BUN 42 (A) 58 (A) 70 (A)   Creatinine 2.31 (A) 2.44 (A) 2.57 (A)   eGFR Non African Am 28 (A) 26 (A) 25 (A)   Sodium 140 137 137   Potassium 4.6 5.4 (A) 5.3 (A)   Chloride 108 (A) 105 107 "   Calcium 9.0 9.2 8.5 (A)   Albumin 4.10 4.40 4.30   Total Bilirubin 0.2 0.3 0.3   Alkaline Phosphatase 260 (A) 201 (A) 164 (A)   AST (SGOT) 18 16 18   ALT (SGPT) 14 19 27   (A) Abnormal value            CBC    CBC 7/27/21 8/3/21 8/10/21   WBC 10.43 6.90 5.23   RBC 4.39 4.15 3.96 (A)   Hemoglobin 12.6 (A) 11.6 (A) 11.4 (A)   Hematocrit 40.2 38.1 35.1 (A)   MCV 91.6 91.8 88.6   MCH 28.7 28.0 28.8   MCHC 31.3 (A) 30.4 (A) 32.5   RDW 19.7 (A) 18.8 (A) 18.4 (A)   Platelets 157 134 (A) 93 (A)   (A) Abnormal value                     Assessment and Plan    Diagnoses and all orders for this visit:    1. COPD with exacerbation (CMS/[facility]) (Primary)  -     cefdinir (OMNICEF) 300 MG capsule; Take 1 capsule by mouth 2 (Two) Times a Day.  Dispense: 14 capsule; Refill: 0    2. Productive cough  -     cefdinir (OMNICEF) 300 MG capsule; Take 1 capsule by mouth 2 (Two) Times a Day.  Dispense: 14 capsule; Refill: 0    3. Fever, unspecified fever cause  -     cefdinir (OMNICEF) 300 MG capsule; Take 1 capsule by mouth 2 (Two) Times a Day.  Dispense: 14 capsule; Refill: 0      I spent 15 minutes caring for Ricardo on this date of service. This time includes time spent by me in the following activities:preparing for the visit, reviewing tests, obtaining and/or reviewing a separately obtained history, performing a medically appropriate examination and/or evaluation , counseling and educating the patient/family/caregiver, ordering medications, tests, or procedures, documenting information in the medical record and independently interpreting results and communicating that information with the patient/family/caregiver     Follow Up     No follow-ups on file.     I advised the patient that most likely his symptoms were allergic and is needing to avoid allergen exposure. He will need to start wearing a N95 while he is mowing or if he can, hire someone else to mow due to the severity of his symptoms. Patient is already taking allergy medicine 2  times a day with flonase as well.    Patient will complete 1 round of antibiotics, will need to see pulmonary if this does not improve his symptoms. Most likely need to repeat chest CT.    Patient was given instructions and counseling regarding his condition or for health maintenance advice. Please see specific information pulled into the AVS if appropriate.

## 2021-08-20 NOTE — TELEPHONE ENCOUNTER
Caller: Ricardo Hugo    Relationship: Self    Best call back number: [phone] (S)    Medication needed:   ANTIBIOTIC     When do you need the refill by: TODAY   What additional details did the patient provide when requesting the medication: STATES HE STILL HAS CONGESTION AND WOULD LIKE A REFILL OF THE MEDICATION     Does the patient have less than a 3 day supply:  [x] Yes  [] No    What is the patient's preferred pharmacy:    [id number] - [place], [place] - [address] AT [facility] [address] - [phone] [facility] [phone]   [phone]

## 2021-08-23 NOTE — PATIENT INSTRUCTIONS
Cefdinir Capsules  What is this medicine?  CEFDINIR (SEF di ner) is a cephalosporin antibiotic. It treats some infections caused by bacteria. It will not work for colds, the flu, or other viruses.  This medicine may be used for other purposes; ask your health care provider or pharmacist if you have questions.  COMMON BRAND NAME(S): Maria Dolores  What should I tell my health care provider before I take this medicine?  They need to know if you have any of these conditions:  · bleeding problems  · kidney disease  · stomach or intestine problems (especially colitis)  · an unusual or allergic reaction to cefdinir, other cephalosporin antibiotics, penicillin, penicillamine, other foods, dyes or preservatives  · pregnant or trying to get pregnant  · breast-feeding  How should I use this medicine?  Take this drug by mouth. Take it as directed on the prescription label at the same time every day. You can take it with or without food. If it upsets your stomach, take it with food. Take all of this drug unless your health care provider tells you to stop it early. Keep taking it even if you think you are better.  Take products with aluminum, magnesium, or iron in them at a different time of day than this drug. Take this drug 2 hours BEFORE or 2 hours AFTER these products.  Talk to your health care provider about the use of this drug in children. While it may be prescribed for selected conditions, precautions do apply.  Overdosage: If you think you have taken too much of this medicine contact a poison control center or emergency room at once.  NOTE: This medicine is only for you. Do not share this medicine with others.  What if I miss a dose?  If you miss a dose, take it as soon as you can. If it is almost time for your next dose, take only that dose. Do not take double or extra doses.  What may interact with this medicine?  · antacids that contain aluminum or magnesium  · iron supplements  · other antibiotics  · probenecid  This list  may not describe all possible interactions. Give your health care provider a list of all the medicines, herbs, non-prescription drugs, or dietary supplements you use. Also tell them if you smoke, drink alcohol, or use illegal drugs. Some items may interact with your medicine.  What should I watch for while using this medicine?  Tell your doctor or health care provider if your symptoms do not get better in a few days.  This medicine may cause serious skin reactions. They can happen weeks to months after starting the medicine. Contact your health care provider right away if you notice fevers or flu-like symptoms with a rash. The rash may be red or purple and then turn into blisters or peeling of the skin. Or, you might notice a red rash with swelling of the face, lips or lymph nodes in your neck or under your arms.  If you are diabetic you may get a false-positive result for sugar in your urine. Check with your doctor or health care provider before you change your diet or the dose of your diabetes medicine.  What side effects may I notice from receiving this medicine?  Side effects that you should report to your doctor or health care professional as soon as possible:  · allergic reactions like skin rash, itching or hives, swelling of the face, lips, or tongue  · bloody or watery diarrhea  · breathing problems  · fever  · redness, blistering, peeling or loosening of the skin, including inside the mouth  · seizures  · trouble passing urine or change in the amount of urine  · unusual bleeding or bruising  · unusually weak or tired  Side effects that usually do not require medical attention (report to your doctor or health care professional if they continue or are bothersome):  · constipation  · diarrhea  · dizziness  · dry mouth  · headache  · loss of appetite  · nausea, vomiting  · stomach pain  · stool discoloration  · tiredness  · vaginal discharge, itching, or odor in women  This list may not describe all possible  side effects. Call your doctor for medical advice about side effects. You may report side effects to FDA at 9-366-AXJ-1154.  Where should I keep my medicine?  Keep out of the reach of children and pets.  Store at room temperature between 20 and 25 degrees C (68 and 77 degrees F). Throw away any unused drug after the expiration date.  NOTE: This sheet is a summary. It may not cover all possible information. If you have questions about this medicine, talk to your doctor, pharmacist, or health care provider.  © 2021 [employer] (2020-07-22 15:57:02)

## 2021-08-25 ENCOUNTER — TELEPHONE (OUTPATIENT)
Dept: VASCULAR SURGERY | Facility: CLINIC | Age: 71
End: 2021-08-25

## 2021-08-26 ENCOUNTER — HOSPITAL ENCOUNTER (OUTPATIENT)
Dept: ULTRASOUND IMAGING | Facility: HOSPITAL | Age: 71
Discharge: HOME | End: 2021-08-26
Admitting: NURSE PRACTITIONER

## 2021-08-26 ENCOUNTER — OFFICE VISIT (OUTPATIENT)
Dept: VASCULAR SURGERY | Facility: CLINIC | Age: 71
End: 2021-08-26

## 2021-08-26 VITALS
HEART RATE: 67 BPM | HEIGHT: 72 IN | OXYGEN SATURATION: 97 % | BODY MASS INDEX: 23.03 KG/M2 | WEIGHT: 170 LBS | SYSTOLIC BLOOD PRESSURE: 134 MMHG | DIASTOLIC BLOOD PRESSURE: 74 MMHG

## 2021-08-26 DIAGNOSIS — I73.9 PAD (PERIPHERAL ARTERY DISEASE) (HCC): Primary | ICD-10-CM

## 2021-08-26 DIAGNOSIS — I10 HYPERTENSION, BENIGN: ICD-10-CM

## 2021-08-26 DIAGNOSIS — I65.23 BILATERAL CAROTID ARTERY STENOSIS: ICD-10-CM

## 2021-08-26 DIAGNOSIS — I65.22 STENOSIS OF LEFT CAROTID ARTERY: ICD-10-CM

## 2021-08-26 PROCEDURE — 93880 EXTRACRANIAL BILAT STUDY: CPT

## 2021-08-26 PROCEDURE — 99213 OFFICE O/P EST LOW 20 MIN: CPT | Performed by: SURGERY

## 2021-08-26 PROCEDURE — 93880 EXTRACRANIAL BILAT STUDY: CPT | Performed by: SURGERY

## 2021-08-26 RX ORDER — VALSARTAN 160 MG/1
160 TABLET ORAL DAILY
COMMUNITY
End: 2021-11-03 | Stop reason: HOSPADM

## 2021-08-26 NOTE — PROGRESS NOTES
"8/26/2021       Joe Velasco MD  [address] [place] [address]    Ricardo Hugo  1950    Chief Complaint   Patient presents with   • Follow-up     3 Month Follow Up For Bilateral Carotid Artery Stenosis. Test [id number] US pad carotid bilateral. Patient denies any stroke like symptoms.    • Former Smoker     Patient is a Former Smoker - Quit 10/2013   • other     patient states been having shortness of breath for about ten days now.    • Med Management     Verified medications from list patient brought in        Dear Jeo Velasco MD       HPI  I had the pleasure of seeing your patient Ricardo Hugo in the office today.  As you recall, Ricardo Hugo is a 71 y.o.  male who you are currently following for routine health maintenance.  He denies any strokelike symptoms.   He does have some claudication to his lower extremities, mostly in the thighs.  He had previous testing showing moderate arterial stenosis.  He was found to have significant carotid disease as well.  He did undergo a right carotid endarterectomy on 5/10/2021. He remains neurologically intact.   He is maintained on aspirin, Plavix, and Lipitor.  He reports his legs are feeling much better.  His most recent kidney function is 24% per his report.      Review of Systems   Constitutional: Negative.    HENT: Negative.    Eyes: Negative.    Respiratory: Negative.    Cardiovascular: Negative.    Gastrointestinal: Negative.    Endocrine: Negative.    Genitourinary: Negative.    Musculoskeletal: Negative.    Skin: Negative.    Allergic/Immunologic: Negative.    Neurological: Negative.    Hematological: Negative.    Psychiatric/Behavioral: Negative.    All other systems reviewed and are negative.      /74 (BP Location: Right arm, Patient Position: Sitting, Cuff Size: Adult)   Pulse 67   Ht 182.9 cm (72\")   Wt 77.1 kg (170 lb)   SpO2 97%   BMI 23.06 kg/m²   Physical Exam  Vitals and nursing note reviewed. "   Constitutional:       Appearance: Normal appearance. He is well-developed and normal weight.   HENT:      Head: Normocephalic and atraumatic.   Eyes:      General: No scleral icterus.     Pupils: Pupils are equal, round, and reactive to light.   Neck:      Thyroid: No thyromegaly.      Vascular: No carotid bruit or JVD.   Cardiovascular:      Rate and Rhythm: Normal rate and regular rhythm.      Pulses:           Carotid pulses are 2+ on the right side and 2+ on the left side.       Femoral pulses are 2+ on the right side and 2+ on the left side.       Popliteal pulses are 0 on the right side and 0 on the left side.        Dorsalis pedis pulses are detected w/ Doppler on the right side and detected w/ Doppler on the left side.        Posterior tibial pulses are detected w/ Doppler on the right side and detected w/ Doppler on the left side.      Heart sounds: Normal heart sounds.   Pulmonary:      Effort: Pulmonary effort is normal.      Breath sounds: Normal breath sounds.   Abdominal:      General: Bowel sounds are normal. There is no distension or abdominal bruit.      Palpations: Abdomen is soft. There is no mass.      Tenderness: There is no abdominal tenderness.   Musculoskeletal:         General: Normal range of motion.      Cervical back: Normal range of motion and neck supple.   Lymphadenopathy:      Cervical: No cervical adenopathy.   Skin:     General: Skin is warm and dry.   Neurological:      General: No focal deficit present.      Mental Status: He is alert and oriented to person, place, and time.      Cranial Nerves: No cranial nerve deficit.      Sensory: No sensory deficit.   Psychiatric:         Mood and Affect: Mood normal.         Behavior: Behavior normal.         Thought Content: Thought content normal.         Judgment: Judgment normal.        Diagnostic data:   Impression      The patient has moderately diminished ankle-brachial indices bilateral    lower extremity(ies) which would be  consistent with claudication level    symptoms.    Based on segmental pressures and doppler waveforms, the patient likely has    disease in the bilateral superficial femoral and tibial arterial segments.      Signature      ----------------------------------------------------------------    Electronically signed by Wellington Palma MD(Interpreting    physician) on 01/28/2020 03:18 PM    ----------------------------------------------------------------     Velocities are measured in cm/s ; Diameters are measured in mm     Pressures   +--------------------------------------++--------+-----+----+--------+-----+   !                                      !!Right   !     !Left!        !     !   +--------------------------------------++--------+-----+----+--------+-----+   !Location                              !!Pressure!Ratio!    !Pressure!Ratio!   +--------------------------------------++--------+-----+----+--------+-----+   !Upper Thigh                           !!144     !1    !    !157     !1.09 !   +--------------------------------------++--------+-----+----+--------+-----+   !Lower Thigh                           !!163     !1.13 !    !111     !0.77 !   +--------------------------------------++--------+-----+----+--------+-----+   !Calf                                  !!119     !0.83 !    !83      !0.58 !   +--------------------------------------++--------+-----+----+--------+-----+   !Ankle PT                              !!105     !0.73 !    !84      !0.58 !   +--------------------------------------++--------+-----+----+--------+-----+   !DP                                    !!101     !0.7  !    !86      !0.6  !   +--------------------------------------++--------+-----+----+--------+-----+   !Great Toe                             !!76      !0.53 !    !46      !0.32 !   +--------------------------------------++--------+-----+----+--------+-----+       - Brachial Pressure:Right: 144.Left:142.       - SAGRARIO:Right:  0.73.Left: 0.6.      Patient Active Problem List   Diagnosis   • Chronic rhinitis   • Acute renal failure superimposed on stage 3 chronic kidney disease (CMS/HCC)   • Hyponatremia   • Acute cystitis   • Metabolic acidosis, increased anion gap   • Hypomagnesemia   • Hypokalemia   • Moderate malnutrition (CMS/HCC)   • Hypertension, benign   • Enterocolitis   • Chronic diarrhea   • UTI (urinary tract infection), bacterial   • Crohn's disease of large intestine with other complication (CMS/HCC)   • Asymmetrical hearing loss of left ear   • Anemia   • CKD (chronic kidney disease) stage 4, GFR 15-29 ml/min (CMS/HCC)   • Bruit of right carotid artery   • Wellness examination   • Pre-op evaluation   • PAD (peripheral artery disease) (CMS/HCC)   • IHD (ischemic heart disease)   • Carotid stenosis   • Stenosis of right carotid artery   • Preop testing   • Carotid stenosis, right   • Avascular necrosis of bone of hip (CMS/HCC)   • Diastolic dysfunction   • Shortness of breath   • CRD (chronic renal disease), stage IV (CMS/HCC)   • Thrombocytopenia (CMS/HCC)   • History of alcoholism (CMS/HCC)   • Anemia due to chronic kidney disease   • Copper deficiency   • Low iron    • CLL (chronic lymphocytic leukemia) (CMS/HCC)   • Pulmonary hypertension (CMS/HCC)         ICD-10-CM ICD-9-CM   1. PAD (peripheral artery disease) (CMS/HCC)  I73.9 443.9   2. Hypertension, benign  I10 401.1   3. Stenosis of left carotid artery  I65.22 433.10       Plan: After thoroughly evaluating Ricardo Hugo, I believe the best course of action is to remain conservative from a vascular surgery standpoint.  He does have evidence of moderate disease in his lower extremities.  Currently, he appears to be doing quite well and his claudication has improved.  No intervention is planned at this time.  I will see him back in 3 months time with a repeat carotid duplex and ABIs for continued surveillance.  His claudication gets worse I told him to call me sooner  perform an angiogram.  He does have chronic renal insufficiency so when we schedule angiogram, he will need hydration prior to the procedure.  I did discuss vascular risk factors as they pertain to the progression of vascular disease including controlling his hypertension, which is currently stable.  The patient can continue taking their current medication regimen as previously planned.  This was all discussed in full with complete understanding.    Thank you for allowing me to participate in the care of your patient.  Please do not hesitate with any questions or concerns.  I will keep you aware of any further encounters with Ricardo Hugo.        Sincerely yours,         Gil Pineda, DO

## 2021-09-01 ENCOUNTER — HOSPITAL ENCOUNTER (OUTPATIENT)
Dept: GENERAL RADIOLOGY | Facility: HOSPITAL | Age: 71
Discharge: HOME | End: 2021-09-01
Admitting: NURSE PRACTITIONER

## 2021-09-01 ENCOUNTER — OFFICE VISIT (OUTPATIENT)
Dept: ONCOLOGY | Facility: CLINIC | Age: 71
End: 2021-09-01

## 2021-09-01 ENCOUNTER — LAB (OUTPATIENT)
Dept: LAB | Facility: HOSPITAL | Age: 71
End: 2021-09-01

## 2021-09-01 VITALS
HEART RATE: 66 BPM | TEMPERATURE: 97.5 F | WEIGHT: 170 LBS | BODY MASS INDEX: 23.03 KG/M2 | SYSTOLIC BLOOD PRESSURE: 184 MMHG | OXYGEN SATURATION: 94 % | RESPIRATION RATE: 16 BRPM | DIASTOLIC BLOOD PRESSURE: 82 MMHG | HEIGHT: 72 IN

## 2021-09-01 DIAGNOSIS — N18.4 CKD (CHRONIC KIDNEY DISEASE) STAGE 4, GFR 15-29 ML/MIN (HCC): ICD-10-CM

## 2021-09-01 DIAGNOSIS — N18.4 ANEMIA DUE TO STAGE 4 CHRONIC KIDNEY DISEASE (HCC): Primary | ICD-10-CM

## 2021-09-01 DIAGNOSIS — D63.1 ANEMIA DUE TO CHRONIC KIDNEY DISEASE, UNSPECIFIED CKD STAGE: Primary | ICD-10-CM

## 2021-09-01 DIAGNOSIS — D63.1 ANEMIA DUE TO STAGE 4 CHRONIC KIDNEY DISEASE (HCC): Primary | ICD-10-CM

## 2021-09-01 DIAGNOSIS — C91.10 CLL (CHRONIC LYMPHOCYTIC LEUKEMIA) (HCC): ICD-10-CM

## 2021-09-01 DIAGNOSIS — N18.9 ANEMIA DUE TO CHRONIC KIDNEY DISEASE, UNSPECIFIED CKD STAGE: Primary | ICD-10-CM

## 2021-09-01 DIAGNOSIS — N18.4 CRD (CHRONIC RENAL DISEASE), STAGE IV (HCC): ICD-10-CM

## 2021-09-01 DIAGNOSIS — R06.00 DYSPNEA, UNSPECIFIED TYPE: ICD-10-CM

## 2021-09-01 LAB
ALBUMIN SERPL-MCNC: 3.9 G/DL (ref 3.5–5.2)
ALBUMIN/GLOB SERPL: 1.5 G/DL
ALP SERPL-CCNC: 239 U/L (ref 39–117)
ALT SERPL W P-5'-P-CCNC: 16 U/L (ref 1–41)
ANION GAP SERPL CALCULATED.3IONS-SCNC: 14 MMOL/L (ref 5–15)
AST SERPL-CCNC: 22 U/L (ref 1–40)
BASOPHILS # BLD AUTO: 0.07 10*3/MM3 (ref 0–0.2)
BASOPHILS NFR BLD AUTO: 1 % (ref 0–1.5)
BILIRUB SERPL-MCNC: 0.4 MG/DL (ref 0–1.2)
BUN SERPL-MCNC: 32 MG/DL (ref 8–23)
BUN/CREAT SERPL: 13.1 (ref 7–25)
CALCIUM SPEC-SCNC: 8.5 MG/DL (ref 8.6–10.5)
CHLORIDE SERPL-SCNC: 106 MMOL/L (ref 98–107)
CO2 SERPL-SCNC: 19 MMOL/L (ref 22–29)
CREAT SERPL-MCNC: 2.44 MG/DL (ref 0.76–1.27)
DEPRECATED RDW RBC AUTO: 66.3 FL (ref 37–54)
EOSINOPHIL # BLD AUTO: 0.17 10*3/MM3 (ref 0–0.4)
EOSINOPHIL NFR BLD AUTO: 2.5 % (ref 0.3–6.2)
ERYTHROCYTE [DISTWIDTH] IN BLOOD BY AUTOMATED COUNT: 19.8 % (ref 12.3–15.4)
GFR SERPL CREATININE-BSD FRML MDRD: 26 ML/MIN/1.73
GLOBULIN UR ELPH-MCNC: 2.6 GM/DL
GLUCOSE SERPL-MCNC: 103 MG/DL (ref 65–99)
HCT VFR BLD AUTO: 32.4 % (ref 37.5–51)
HGB BLD-MCNC: 10.6 G/DL (ref 13–17.7)
HOLD SPECIMEN: NORMAL
IMM GRANULOCYTES # BLD AUTO: 0.04 10*3/MM3 (ref 0–0.05)
IMM GRANULOCYTES NFR BLD AUTO: 0.6 % (ref 0–0.5)
LYMPHOCYTES # BLD AUTO: 1.48 10*3/MM3 (ref 0.7–3.1)
LYMPHOCYTES NFR BLD AUTO: 21.8 % (ref 19.6–45.3)
MCH RBC QN AUTO: 29.9 PG (ref 26.6–33)
MCHC RBC AUTO-ENTMCNC: 32.7 G/DL (ref 31.5–35.7)
MCV RBC AUTO: 91.5 FL (ref 79–97)
MONOCYTES # BLD AUTO: 0.84 10*3/MM3 (ref 0.1–0.9)
MONOCYTES NFR BLD AUTO: 12.4 % (ref 5–12)
NEUTROPHILS NFR BLD AUTO: 4.18 10*3/MM3 (ref 1.7–7)
NEUTROPHILS NFR BLD AUTO: 61.7 % (ref 42.7–76)
NRBC BLD AUTO-RTO: 0 /100 WBC (ref 0–0.2)
PLATELET # BLD AUTO: 122 10*3/MM3 (ref 140–450)
PMV BLD AUTO: 10 FL (ref 6–12)
POTASSIUM SERPL-SCNC: 4.4 MMOL/L (ref 3.5–5.2)
PROT SERPL-MCNC: 6.5 G/DL (ref 6–8.5)
RBC # BLD AUTO: 3.54 10*6/MM3 (ref 4.14–5.8)
SODIUM SERPL-SCNC: 139 MMOL/L (ref 136–145)
WBC # BLD AUTO: 6.78 10*3/MM3 (ref 3.4–10.8)

## 2021-09-01 PROCEDURE — 85025 COMPLETE CBC W/AUTO DIFF WBC: CPT

## 2021-09-01 PROCEDURE — 36415 COLL VENOUS BLD VENIPUNCTURE: CPT

## 2021-09-01 PROCEDURE — 80053 COMPREHEN METABOLIC PANEL: CPT | Performed by: NURSE PRACTITIONER

## 2021-09-01 PROCEDURE — 99214 OFFICE O/P EST MOD 30 MIN: CPT | Performed by: NURSE PRACTITIONER

## 2021-09-01 PROCEDURE — 71046 X-RAY EXAM CHEST 2 VIEWS: CPT

## 2021-09-01 NOTE — PROGRESS NOTES
"[facility]  HEMATOLOGY & ONCOLOGY    [facility] HEMATOLOGY AND ONCOLOGY  [address]  [facility] [address] [phone]    Patient Name: Ricardo Hugo  Encounter Date: 09/01/2021  YOB: 1950  Patient Number: [id number]    Chief Complaint   Patient presents with   • CLL (chronic lymphocytic leukemia) (CMS/HCC)     had the vaccine booster yesterday     HEMATOLOGIC HISTORY  Subjective: 71-year-old male who has a known history of chronic kidney disease who is referred by Dr. Rueda from nephrology for anemia.  The notes from [facility] show that the patient was seen on 6/3/2021 where his creatinine was noted to be 2.5 that was a little bit worse than in March 2021 but had developed worsening anemia with a hemoglobin of 7.3 and was thought to be \"very deficient in iron\".       The patient was also seen by Dr. Omalley from pulmonary medicine when the patient's PCP, Dr. Velasco, had asked pulmonary to see the patient for worrisome dyspnea.  It was noted that 10 months prior an echocardiogram that shown suggestion of pulmonary hypertension.  At that time the patient did not believe that his dyspnea was worse than it was a year previously.  Upon review of the patient's previous medical problems, there is a cornucopia of medical illnesses with the ones that could possibly be related to anemia including but not limited to the following: Three-vessel CAD, arthritis, Crohn's disease (\"slight case\" according to what he was told) with chronic diarrhea, iron deficiency anemia, personal history of alcoholism (and he states that he quit drinking in 2013).     Denies noted bleeding anywhere (only when he was on a blood thinner when he had some nosebleeds -- changed the blood thinner and the bleeding stopped).  He was only recently diagnosed with iron deficienc.  He does  eat meat on a regular basis.  Denies  signs of pica syndrome.     He " had previously seen Dr. Hernandez 10 years ago but he doesn't recall why and does not knwo the resulst of the marrow (the office doesn't keep records for that long).     On follow-up on July 6, 2021: After the patient's initial consultation, he was noted to have a hemoglobin less than 7 and therefore called in for a transfusion.  He states that he is feeling better after the transfusion was given.      6.25.21 1 unit PRBC      Patient was also noted to have a low copper and a high zinc on labs on initial consultation and copper supplementation was supposed to be started on  6/30/202 but patient did not receive from pharmacy and therefore re-ordered 7.6.21.  Hemoglobin to 8.6 on 7/6/2021.  Scheduled for BM Bx on 7.12.21 -- to hold plavix for 5 days before the procedure but OK to continue ASA     On follow-up on 7/20/2021:  Was found to have low copper and started on supplements 7.6.21, he denies any bleeding        Bone marrow biopsy done 7/12/2021:  · with a flow cytometry showing a 2% monoclonal B-cell kappa population consistent with CLL/SLL.    · CLL panel:  ? Negative for a t (11;14)/CCND1-IGH rearrangement  ? Negative for deletion of MARIA on the long arm of chromosome 11 q. 22  ? Negative for trisomy 12  ? Negative for deletion of DLEU1, DLEU2 on the long arm of chromosome 13 q. 14 and monosomy 13  ? Negative for deletion T p53 in the short arm of chromosome 17 P 13  · Cytogenetics showed normal male karyotype  · Bone marrow biopsy and aspirate show involvement by chronic lymphocytic leukemia/small lymphocytic lymphoma and up to 5% of cellularity, mild hypercellular marrow with maturing trilineage hematopoiesis, erythroid hyperplasia and a mild atypical small lymphocytic infiltrate with a dominant nodular pattern  · Storage iron is present     · Ultrasound of the abdomen from 7/12/2021 shows cirrhotic liver but normal spleen size.   · Lab work up  ? reticulocyte counts elevated  ? folate WNL  ? B12 level  WNL  ? Haptoglobin WNL  ? LDH WNL  ? Peripheral smear: Severe normochromic normocytic anemia.  Rare circulating schistocytes. Normal white blood cell count with normal differential and morphology. No circulating blasts. Adequate platelets with normal morphology.   ? NOÉ negative   ? ferritin WNL --> increased to >700 on 7.6.21 as received injectafer recently  ? iron profile showing low iron saturation and iron but normal TIBC and transferrin saturation, may be indicative of iron deficiency or low iron levels --> iron profil corrected on 7.6.21  ? Copper abnormally low at 46, started copper supplementation on 6/30/2021 but patient did not receive from pharmacy for some reason. Reordered on 7.6.21 -- and he did start that  ? Zinc elevated at 146 on day of consultation.   Bone marrow biopsy done 7/12/2021:  · with a flow cytometry showing a 2% monoclonal B-cell kappa population consistent with CLL/SLL.    · CLL panel:  ? Negative for a t (11;14)/CCND1-IGH rearrangement  ? Negative for deletion of MARIA on the long arm of chromosome 11 q. 22  ? Negative for trisomy 12  ? Negative for deletion of DLEU1, DLEU2 on the long arm of chromosome 13 q. 14 and monosomy 13  ? Negative for deletion T p53 in the short arm of chromosome 17 P 13  · Cytogenetics showed normal male karyotype  · Bone marrow biopsy and aspirate show involvement by chronic lymphocytic leukemia/small lymphocytic lymphoma and up to 5% of cellularity, mild hypercellular marrow with maturing trilineage hematopoiesis, erythroid hyperplasia and a mild atypical small lymphocytic infiltrate with a dominant nodular pattern  · Storage iron is present       REASON FOR VISIT: Mr. Hugo is a 70 yo gentleman here today in follow up for CLL and anemia of CKD stage III.  His CLL was diagnosed approximately 10yrs ago per Dr Hernandez he states and he has never required treatment.  He remains on watch and wait approach currently.  He had a repeat marrow on 7/12/2021 due to  the anemia and intermittent thrombocytopenia. Marrow showed 2% monoclonal Bcell kappa population consistent with CLL/SLL.  CLL genetic panel was negative.     His anemia was felt to be secondary to CKD stage III.  He was started on retacrit on 7/6/2021 for hgb of 8.6.  He returned on 7/13/2021 and hgb was up to 9.2. He responded well to that injection with hgb rising to 12.6 on 7/27/2021.    He presents back today stating that he has been short of breath the last few days.  He has not been able to sleep well at night because it is harder to breath laying down.  He has no cough or chest pain.  He has had no fever or chills.  He denies any GI upset. His BP today is up at 184/82.      His counts today show a WBC of 6.78, hgb 10.6 and platelets of 122,000.  CMP shows a glucose of 103, and ALKP 239.     PAST MEDICAL HISTORY:  ALLERGIES:  Allergies   Allergen Reactions   • Lortab [Hydrocodone-Acetaminophen] Other (See Comments) and Hallucinations     CLOSTROPHOBIC   • Allopurinol Other (See Comments)     Pain on right side       CURRENT MEDICATIONS:  Outpatient Encounter Medications as of 9/1/2021   Medication Sig Dispense Refill   • albuterol sulfate  (90 Base) MCG/ACT inhaler USE 2 INHALATIONS FOUR TIMES A DAY AS NEEDED 20.1 g 3   • amLODIPine (NORVASC) 10 MG tablet TAKE 1 TABLET DAILY 90 tablet 3   • aspirin (ASPIR) 81 MG EC tablet Take 81 mg by mouth Daily.     • Aspirin-Acetaminophen-Caffeine (EXCEDRIN EXTRA STRENGTH PO) Take 2 tablets by mouth Daily As Needed (HEADACHES).     • atorvastatin (LIPITOR) 10 MG tablet Take 1 tablet by mouth Daily. 90 tablet 3   • azelastine (ASTELIN) 0.1 % nasal spray 1 spray into the nostril(s) as directed by provider 2 (Two) Times a Day. Use in each nostril as directed 90 mL 3   • cholestyramine (QUESTRAN) 4 g packet Take 1 packet by mouth Daily. 90 packet 3   • cloNIDine (CATAPRES) 0.2 MG tablet TAKE 3 TABLETS DAILY 270 tablet 3   • clopidogrel (Plavix) 75 MG tablet Take 1  tablet by mouth Daily. 90 tablet 3   • Copper Gluconate 2 MG tablet Take 2 mg by mouth Daily. 30 tablet 6   • desoximetasone (TOPICORT) 0.25 % cream APPLY TO THE AFFECTED AREA TWICE A DAY AS NEEDED 30 g 3   • diphenhydrAMINE (Benadryl Allergy) 25 mg capsule Take 25 mg by mouth Every 6 (Six) Hours As Needed for Itching.     • fexofenadine (ALLEGRA) 180 MG tablet Take 180 mg by mouth Daily.     • fluticasone (FLONASE) 50 MCG/ACT nasal spray 2 sprays into the nostril(s) as directed by provider Daily As Needed for Rhinitis.     • levothyroxine (Synthroid) 75 MCG tablet Take 1 tablet by mouth Daily. 90 tablet 3   • LUTEIN PO Take 5 mg by mouth Daily.     • magnesium chloride ER 64 MG DR tablet Take 143 mg by mouth Daily.     • Melatonin 10 MG capsule Take 2 capsules by mouth Every Night.     • mesalamine (APRISO) 0.375 g 24 hr capsule Take 4 capsules by mouth Daily. 360 capsule 3   • metoprolol succinate XL (TOPROL-XL) 25 MG 24 hr tablet Take 1 tablet by mouth Daily. 90 tablet 3   • Multiple Vitamins-Minerals (PRESERVISION/LUTEIN) capsule Take 1 capsule by mouth 2 (two) times a day.     • omeprazole (priLOSEC) 20 MG capsule Take 1 capsule by mouth Daily. 90 capsule 3   • potassium chloride 10 MEQ CR tablet Take 1 tablet by mouth 2 (Two) Times a Day. 180 tablet 3   • sodium bicarbonate 650 MG tablet Take 1 tablet by mouth 2 (Two) Times a Day. 180 tablet 3   • valsartan (DIOVAN) 160 MG tablet Take 160 mg by mouth Daily.     • vitamin D (ERGOCALCIFEROL) 1.25 MG (78866 UT) capsule capsule Take 1 capsule by mouth 1 (One) Time Per Week. 5 capsule 11   • Fluticasone Furoate-Vilanterol (Breo Ellipta) 200-25 MCG/INH inhaler Inhale 1 puff Daily for 14 days. 1 each 0     No facility-administered encounter medications on file as of 9/1/2021.       ADULT ILLNESSES:  Patient Active Problem List   Diagnosis Code   • Chronic rhinitis J31.0   • Acute renal failure superimposed on stage 3 chronic kidney disease (CMS/[facility]) N17.9, N18.30   •  Hyponatremia E87.1   • Acute cystitis N30.00   • Metabolic acidosis, increased anion gap E87.2   • Hypomagnesemia E83.42   • Hypokalemia E87.6   • Moderate malnutrition (CMS/[facility]) E44.0   • Hypertension, benign I10   • Enterocolitis K52.9   • Chronic diarrhea K52.9   • UTI (urinary tract infection), bacterial N39.0, A49.9   • Crohn's disease of large intestine with other complication (CMS/HCC) K50.118   • Asymmetrical hearing loss of left ear [id number]   • Anemia D64.9   • CKD (chronic kidney disease) stage 4, GFR 15-29 ml/min (CMS/[facility]) N18.4   • Bruit of right carotid artery R09.89   • Wellness examination Z00.00   • Pre-op evaluation Z01.818   • PAD (peripheral artery disease) (CMS/[facility]) I73.9   • IHD (ischemic heart disease) I25.9   • Carotid stenosis I65.29   • Stenosis of right carotid artery I65.21   • Preop testing Z01.818   • Carotid stenosis, right I65.21   • Avascular necrosis of bone of hip (CMS/[facility]) M87.059   • Diastolic dysfunction I51.89   • Shortness of breath R06.02   • CRD (chronic renal disease), stage IV (CMS/HCC) N18.4   • Thrombocytopenia (CMS/HCC) D69.6   • History of alcoholism (CMS/HCC) F10.21   • Anemia due to chronic kidney disease N18.9, D63.1   • Copper deficiency E61.0   • Low iron  E61.1   • CLL (chronic lymphocytic leukemia) (CMS/HCC) C91.10   • Pulmonary hypertension (CMS/HCC) I27.20       SURGERIES:  Past Surgical History:   Procedure Laterality Date   • ARTERY SURGERY  2021    right side on neck   • CAROTID ENDARTERECTOMY Right 5/10/2021    Procedure: RIGHT CAROTID ENDARTERECTOMY WITH EEG;  Surgeon: Gil Pineda DO;  Location: [facility] OR 12;  Service: Vascular;  Laterality: Right;   • COLONOSCOPY N/A 7/2/2020    Procedure: COLONOSCOPY WITH ANESTHESIA;  Surgeon: Adrien Brewster MD;  Location: [facility] ENDOSCOPY;  Service: Gastroenterology;  Laterality: N/A;  pre op: diarrhea  post op: polyps  PCP: Joe Velasco MD   • COLONOSCOPY N/A 10/13/2020    Procedure: COLONOSCOPY  WITH ANESTHESIA;  Surgeon: Adrien Brewster MD;  Location: [facility] ENDOSCOPY;  Service: Gastroenterology;  Laterality: N/A;  Pre: Chronic Diarrhea, Crohn's  Post: AVM  Dr. Neftali Velasco  CO2 Inflation Used   • CORONARY ARTERY BYPASS GRAFT  2003    x3   • EYE SURGERY Bilateral     catorac   • INCISION AND DRAINAGE PERIRECTAL ABSCESS N/A 3/3/2017    Procedure: INCISION AND DRAINAGE OF JEET ANAL ABSCESS;  Surgeon: Lynette Smith MD;  Location: [facility] OR;  Service:    • MYRINGOTOMY W/ TUBES Left 2017    06/10/2016   • TONSILLECTOMY     • TOTAL HIP ARTHROPLASTY Right        HEALTH MAINTENANCE ITEMS:  <no information>  Last Completed Colonoscopy     This patient has no relevant Health Maintenance data.          Last Completed Mammogram     This patient has no relevant Health Maintenance data.          FAMILY HISTORY:  Family History   Problem Relation Age of Onset   • No Known Problems Mother    • No Known Problems Father    • No Known Problems Daughter    • Colon cancer Neg Hx    • Colon polyps Neg Hx        SOCIAL HISTORY:  Social History     Socioeconomic History   • Marital status:      Spouse name: Not on file   • Number of children: Not on file   • Years of education: Not on file   • Highest education level: Not on file   Tobacco Use   • Smoking status: Former Smoker     Packs/day: 0.50     Years: 25.00     Pack years: 12.50     Types: Cigarettes     Start date:      Quit date: 10/13/2013     Years since quittin.8   • Smokeless tobacco: Never Used   • Tobacco comment: quit    Vaping Use   • Vaping Use: Never used   Substance and Sexual Activity   • Alcohol use: Yes     Alcohol/week: 5.0 standard drinks     Types: 5 Cans of beer per week     Comment: rare- very little, 5 beers a week but previously was an alcoholic when he was drinking 10 beers a day and was also drinking vodka   • Drug use: No   • Sexual activity: Defer       REVIEW OF SYSTEMS:  Review of Systems   Constitutional:  "Positive for fatigue. Negative for activity change, appetite change, chills, diaphoresis, fever and unexpected weight loss.   HENT: Negative for ear pain, nosebleeds, sinus pressure, sore throat and voice change.    Eyes: Negative for blurred vision, double vision, pain and visual disturbance.   Respiratory: Positive for shortness of breath. Negative for cough.    Cardiovascular: Positive for leg swelling (\"a little in the ankles\"). Negative for chest pain and palpitations.   Gastrointestinal: Negative for abdominal pain, anal bleeding, blood in stool, constipation, diarrhea, nausea and vomiting.   Endocrine: Negative for heat intolerance, polydipsia and polyuria.   Genitourinary: Negative for dysuria, frequency, hematuria, urgency and urinary incontinence.   Musculoskeletal: Negative for arthralgias and myalgias.   Skin: Negative for rash and skin lesions.   Neurological: Negative for dizziness, weakness and headache.   Hematological: Negative for adenopathy. Does not bruise/bleed easily.   Psychiatric/Behavioral: Negative for sleep disturbance and depressed mood.       BP (!) 184/82   Pulse 66   Temp 97.5 °F (36.4 °C)   Resp 16   Ht 182.9 cm (72\")   Wt 77.1 kg (170 lb)   SpO2 94%   BMI 23.06 kg/m²  Body surface area is 1.99 meters squared.  Pain Score    09/01/21 1402   PainSc: 0-No pain       Physical Exam:  Physical Exam  Constitutional:       Appearance: Normal appearance. He is well-developed.   HENT:      Head: Atraumatic.   Eyes:      General: No scleral icterus.     Pupils: Pupils are equal, round, and reactive to light.   Neck:      Vascular: No JVD.      Trachea: Trachea normal.   Cardiovascular:      Rate and Rhythm: Normal rate and regular rhythm.      Pulses: Normal pulses.      Heart sounds: No murmur heard.     Pulmonary:      Effort: Pulmonary effort is normal.      Breath sounds: Decreased air movement present. Examination of the left-lower field reveals rales. Rales present. No wheezing or " rhonchi.   Abdominal:      Palpations: Abdomen is soft.      Tenderness: There is no abdominal tenderness. There is no guarding or rebound.   Musculoskeletal:      Cervical back: Neck supple.   Skin:     General: Skin is warm and dry.   Neurological:      General: No focal deficit present.      Mental Status: He is alert and oriented to person, place, and time.   Psychiatric:         Mood and Affect: Mood normal.         Behavior: Behavior normal.         Ricardo Hugo reports a pain score of 0.    Patient's Body mass index is 23.06 kg/m². indicating that he is within normal range (BMI 18.5-24.9). No BMI management plan needed..      LABS    Lab Results - Last 18 Months   Lab Units 09/01/21  1323 08/10/21  1252 08/03/21  1021 07/27/21  1245 07/20/21  1029 07/13/21  1315 08/11/20  0936 07/22/20  1526 07/11/20  1114 04/28/20  1232   HEMOGLOBIN g/dL 10.6* 11.4* 11.6* 12.6* 11.3* 9.2*   < > 9.9*   < > 13.1   HEMATOCRIT % 32.4* 35.1* 38.1 40.2 37.4* 30.3*   < > 31.4*   < > 37.4*   MCV fL 91.5 88.6 91.8 91.6 93.7 90.4   < > 104.0*   < > 97.9*   WBC 10*3/mm3 6.78 5.23 6.90 10.43 10.99* 5.84   < > 10.84*   < > 11.64*   RDW % 19.8* 18.4* 18.8* 19.7* 20.7* 18.9*   < > 13.2   < > 13.0   MPV fL 10.0 10.2 10.5 10.1 10.4 10.2   < >  --    < >  --    PLATELETS 10*3/mm3 122* 93* 134* 157 173 129*   < > 263   < > 151   IMM GRAN % % 0.6* 0.4 0.4 1.4* 1.7* 0.5   < >  --    < >  --    NEUTROS ABS 10*3/mm3 4.18 2.67 3.90 5.43 7.39* 3.48   < > 7.71*   < > 10.45*   LYMPHS ABS 10*3/mm3 1.48 1.68 1.99 3.53* 2.26 1.35   < > CANCELED  1.34   < > CANCELED  0.00*   MONOS ABS 10*3/mm3 0.84 0.50 0.57 0.94* 1.03* 0.68   < > 1.34*   < > 0.95*   EOS ABS 10*3/mm3 0.17 0.33 0.34 0.33 0.06 0.26   < > CANCELED   < > CANCELED   EOSINOPHIL ABS 10*3/mm3  --   --   --   --   --   --   --  0.44*  --  0.12   EOSINOPHIL % %  --   --   --   --   --   --   --  4.1  --  1.0   BASOS ABS 10*3/mm3 0.07 0.03 0.07 0.05 0.06 0.04   < > CANCELED   < > CANCELED    IMMATURE GRANS (ABS) 10*3/mm3 0.04 0.02 0.03 0.15* 0.19* 0.03   < >  --    < >  --    NRBC /100 WBC 0.0 0.0 0.0 0.0 0.0 0.0   < > 0.0   < >  --    NEUTROPHIL % %  --   --   --   --   --   --   --  71.1  --  89.8*   MONOCYTES % %  --   --   --   --   --   --   --  12.4*  --  8.2    < > = values in this interval not displayed.       Lab Results - Last 18 Months   Lab Units 07/27/21  1245 07/20/21  1029 07/06/21  1320 06/25/21  1155 05/07/21  1213 04/12/21  0856 03/11/21  0951 09/22/20  0346 09/21/20  0338 09/21/20  0338 09/19/20  0427 09/19/20  0427 08/11/20  0936 05/27/20  1302   GLUCOSE mg/dL 94 109* 109* 123* 109*  --   --  103*   < > 102*   < > 96  --   --    SODIUM mmol/L 137 137 140 138 139  --  139 135*   < > 133*   < > 138   < >  --    POTASSIUM mmol/L 5.3* 5.4* 4.6 4.4 5.0  --  4.4 5.7*   < > 5.2   < > 3.9   < >  --    TOTAL CO2 mmol/L  --   --   --   --   --   --  20.5*  --   --   --   --   --    < >  --    CO2 mmol/L 18.0* 19.0* 22.0 20.0* 18.0*  --   --  20.0*   < > 18.0*   < > 20.0*  --   --    CHLORIDE mmol/L 107 105 108* 106 110*  --  107 104   < > 105   < > 107   < >  --    ANION GAP mmol/L 12.0 13.0 10.0 12.0 11.0  --   --  11.0   < > 10.0   < > 11.0  --   --    CREATININE mg/dL 2.57* 2.44* 2.31* 2.36* 2.30* 3.10* 2.22* 2.52*  2.52*   < > 2.26*   < > 2.60*   < >   < >   BUN mg/dL 70* 58* 42* 43* 40*  --  40* 37*   < > 28*   < > 28*   < >  --    BUN / CREAT RATIO  27.2* 23.8 18.2 18.2 17.4  --  18.0 14.7   < > 12.4   < > 10.8   < >  --    CALCIUM mg/dL 8.5* 9.2 9.0 8.8 8.9  --  8.5* 9.0   < > 8.7   < > 8.8   < >  --    EGFR IF NONAFRICN AM mL/min/1.73 25* 26* 28* 27* 28*  --  29* 25*  25*   < > 29*   < > 24*   < >  --    ALK PHOS U/L 164* 201* 260* 206*  --   --  218*  --   --  205*   < > 167*  --   --    TOTAL PROTEIN g/dL 7.2 7.6 7.9 7.5  --   --   --   --   --  6.3  --  6.3  --   --    ALT (SGPT) U/L 27 19 14 9  --   --  16  --   --  9   < > 9  --   --    AST (SGOT) U/L 18 16 18 15  --   --  19   --   --  18   < > 14  --   --    BILIRUBIN mg/dL 0.3 0.3 0.2 0.2  --   --  0.3  --   --  0.3   < > 0.4  --   --    ALBUMIN g/dL 4.30 4.40 4.10 4.00  3.7  --   --  3.90  --   --  2.70*   < > 2.80*   < >  --    GLOBULIN gm/dL 2.9 3.2 3.8 3.5  --   --   --   --   --  3.6  --  3.5  --   --     < > = values in this interval not displayed.       Lab Results - Last 18 Months   Lab Units 07/27/21  1245 07/20/21  1029 06/25/21  1155 03/11/21  0951 09/15/20  1934 07/11/20  1114 07/02/20  0901   M-SPIKE g/dL  --   --  Not Observed  --   --   --   --    URIC ACID mg/dL 9.5* 9.2*  --  8.7*  --  9.9*  --    LDH U/L  --   --  181  --  166  --   --    REFERENCE LAB REPORT   --   --   --   --   --   --  See Attached Report       Lab Results - Last 18 Months   Lab Units 07/27/21  1245 07/20/21  1029 07/06/21  1320 06/25/21  1155 06/21/21  1206 05/05/21  1014 03/11/21  0951 09/16/20  0941 07/22/20  1526 07/13/20  0538 07/11/20  1114 06/04/20  1017   IRON mcg/dL 150 49* 93 25*  --   --   --  11*  --  32*  --   --    TIBC mcg/dL 325 337 370 420  --   --   --  183* 281 173*  --   --    IRON SATURATION % 46 15* 25 6*  --   --   --  6* 22 19*  --   --    FERRITIN ng/mL 159.60 159.20 735.80* 31.91  --   --   --   --   --   --   --   --    TSH uIU/mL  --   --   --  3.150 4.170 6.550* 8.010*  --   --   --  4.780* 3.700   FOLATE ng/mL  --   --   --  13.00  --   --   --   --  7.14  --   --   --      ASSESSMENT  1. Normocytic normochromic anemia secondary to chronic kidney disease stage IVand intermittent iron deficiency.   2.  Iron deficiency, baseline iron saturation 6% and ferritin 31.91 on 6/25/2021 likely secondary to crohns disease.   · Injectafer 750mg IV on 6/29/2021  · Repeat iron studies 7/27/2021 showed iron saturation of 46%, ferritin 159.60  3.  CKD stage IV related anemia  · Started retacrit 7/6/2021 for hgb of 8.6, returned on 7/13/2021 and hgb up to 10.2 therefore received another injection on 7/13/2021.    · Hgb today 10.6 and  "hct 32.4.  Meets guidelines for retacrit but BP is quite high today and since retacrit can increase BP, will hold it today.   3.  CLL/SLL  · Bone marrow biopsy showing 2% monoclonal B-cell kappa population consistent with CLL/SLL  · CLL panel on BMBx negative  · Asymptomatic   · Watch and wait  4.  CKD stage IV with GFR remaining mid 20's  5.  Hypertension, on multiple antihypertensives. /82 today. Rechecked at 188/82. SPEP WNL, no Mspike; UPEP WNL, no M spike, immunoglobulins normal.   6.  Intermittent thrombocytopenia due to underlying liver disease, history of ETOH abuse  · Ultrasound of the abdomen, 7.12.21: 1.  Cirrhotic  morphology/appearance of the liver. No solid liver lesion. No evidence of ascites. 2.  Spleen is within normal limits in size measuring 12.0 x 4.8 x 1.0 cm.3.  Cholelithiasis without evidence of cholecystitis. This may require GI evaluation  · Noted to have an elevated alkaline phosphatase and sometimes elevated ALT and AST.  May be related to either alcoholism and or Crohn's disease and should be followed up by GI and PCP.  7.  Pulmonary hypertension being followed by pulmonary Dr. Omalley  8.  Ventricular tachycardia, CAD, atherosclerosis with intermittent claudication, carotid stenosis, heart disease, hyperlipidemia, hypertension all to be followed up by PCP/cardiology as needed  9.  Dyspnea worse over the last couple of days, Worse with laying down at night. Exam with rales in LLL, BP high.  Patient states he has been out of \"fluid pill\" for 10 days and could not get a refill. Suspect fluid overload.     PLAN  1.  Reviewed labs with patient.   2.  Discussed patients symptoms today and with the BP elevation I will hold the retacrit today and post pone til BP better controlled.   3.  I called Dr Velasco's office and they will see him tomorrow at 8 am  4.  Will obtain CXR today for review and for pcp to have in am for his evaluation  5.  Advised patient if he was to worsen thru the rest of " today to go to ER, ( he would not go when asked during our visit today)   6.  Return on Friday for possible retacrit if BP under control  7.  Return every 4 weeks for cbc, cmp and retacrit if hgb <11 and Hct < 33, GFR < 60, iron saturation 20% and ferritin of 100.   8  Check iron studies every 8-12 weeks  9.  Will see patient back in 8 weeks for follow up. He will need cbc, cmp, iron profile and ferritin    Marcia Kraus, [place]  09/01/2021

## 2021-09-02 ENCOUNTER — OFFICE VISIT (OUTPATIENT)
Dept: INTERNAL MEDICINE | Facility: CLINIC | Age: 71
End: 2021-09-02

## 2021-09-02 VITALS
DIASTOLIC BLOOD PRESSURE: 80 MMHG | TEMPERATURE: 97.9 F | HEART RATE: 63 BPM | OXYGEN SATURATION: 98 % | HEIGHT: 72 IN | WEIGHT: 169 LBS | BODY MASS INDEX: 22.89 KG/M2 | SYSTOLIC BLOOD PRESSURE: 190 MMHG

## 2021-09-02 DIAGNOSIS — N18.4 CKD (CHRONIC KIDNEY DISEASE) STAGE 4, GFR 15-29 ML/MIN (HCC): ICD-10-CM

## 2021-09-02 DIAGNOSIS — I10 HYPERTENSION, BENIGN: Primary | ICD-10-CM

## 2021-09-02 DIAGNOSIS — K50.118 CROHN'S DISEASE OF LARGE INTESTINE WITH OTHER COMPLICATION (HCC): ICD-10-CM

## 2021-09-02 PROCEDURE — 99214 OFFICE O/P EST MOD 30 MIN: CPT | Performed by: INTERNAL MEDICINE

## 2021-09-02 RX ORDER — HYDRALAZINE HYDROCHLORIDE 10 MG/1
10 TABLET, FILM COATED ORAL 3 TIMES DAILY
Qty: 90 TABLET | Refills: 3 | Status: SHIPPED | OUTPATIENT
Start: 2021-09-02 | End: 2021-10-25

## 2021-09-02 RX ORDER — FUROSEMIDE 20 MG/1
20 TABLET ORAL DAILY
Qty: 90 TABLET | Refills: 3 | Status: SHIPPED | OUTPATIENT
Start: 2021-09-02 | End: 2022-03-10 | Stop reason: SDUPTHER

## 2021-09-02 NOTE — PROGRESS NOTES
Subjective   Ricardo Hugo is a 71 y.o. male.   Chief Complaint   Patient presents with   • Hypertension     yesterday BP was 182/84   • Med Refill     dicsuss refill on furosemide 20mg; i do not have it listed on his current medications and was d/c in chart        History of Present Illness patient is here requesting a refill on his Lasix.  He had stopped his Lasix really not sure why he began taking it when his blood pressure was elevated.  Patient has a history of chronic renal insufficiency associated with severe bleeding Crohn's disease etc.  He had a chest x-ray yesterday which showed bilateral pleural effusions I did review that chest x-ray as well as his most recent nephrology note.  On his nephrology note I could not see Lasix listed as a medication however I did see sodium bicarbonate and potassium supplementation.    The following portions of the patient's history were reviewed and updated as appropriate: allergies, current medications, past family history, past medical history, past social history, past surgical history and problem list.    Review of Systems   Constitutional: Negative for activity change, appetite change, fatigue, fever, unexpected weight gain and unexpected weight loss.   HENT: Negative for swollen glands, trouble swallowing and voice change.    Eyes: Negative for blurred vision and visual disturbance.   Respiratory: Negative for cough and shortness of breath.    Cardiovascular: Negative for chest pain, palpitations and leg swelling.   Gastrointestinal: Negative for abdominal pain, constipation, diarrhea, nausea, vomiting and indigestion.   Endocrine: Negative for cold intolerance, heat intolerance, polydipsia and polyphagia.   Genitourinary: Negative for dysuria and frequency.   Musculoskeletal: Negative for arthralgias, back pain, joint swelling and neck pain.   Skin: Negative for color change, rash and skin lesions.   Neurological: Negative for dizziness, weakness, headache,  memory problem and confusion.   Hematological: Does not bruise/bleed easily.   Psychiatric/Behavioral: Negative for agitation, hallucinations and suicidal ideas. The patient is not nervous/anxious.        Objective   Past Medical History:   Diagnosis Date   • 3-vessel CAD 8/11/2020   • Allergic rhinitis    • Anxiety disorder 4/27/2020   • Arthritis    • Asymmetrical sensorineural hearing loss 6/28/2017   • Atherosclerosis of native artery of both lower extremities with intermittent claudication (CMS/[facility]) 7/18/2019   • Avascular necrosis of femoral head, left (CMS/[facility]) 07/11/2020    right hip after surgery   • Carotid stenosis    • Chronic mucoid otitis media    • Chronic rhinitis    • Coronary artery disease     HEART BYPASS 2004   • Crohn's disease of large intestine with other complication (CMS/[facility]) 7/30/2020    Chronic diarrhea Colonoscopy July 2020 revealed mild patchy scattered hemosiderin staining with inflammation more so in rectosigmoid area.  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   • Iron deficiency anemia 7/14/2020   • Mixed hearing loss of left ear    • PAD (peripheral artery disease) (CMS/[facility]) 8/11/2020   • Perianal abscess    • Pernicious anemia 08/17/2020    took shots but never diagnosed with b12 deficiency   • Personal history of alcoholism (CMS/[facility]) 08/11/2020    quit drinking in 2013   • Prostatic hypertrophy 8/11/2020   • Sensorineural hearing loss    • Sepsis with acute renal failure (CMS/[facility]) 9/15/2020   • Shortness of breath 5/27/2021   • Tinnitus    • Ventricular tachycardia, nonsustained (CMS/[facility]) 7/14/2020   •  Weight loss 7/11/2020      Past Surgical History:   Procedure Laterality Date   • ARTERY SURGERY  2021    right side on neck   • CAROTID ENDARTERECTOMY Right 5/10/2021    Procedure: RIGHT CAROTID ENDARTERECTOMY WITH EEG;  Surgeon: Gil Pineda DO;  Location: [facility] HYBRID OR 12;  Service: Vascular;  Laterality: Right;   • COLONOSCOPY N/A 7/2/2020    Procedure: COLONOSCOPY WITH ANESTHESIA;  Surgeon: Adrien Brewster MD;  Location: [facility] ENDOSCOPY;  Service: Gastroenterology;  Laterality: N/A;  pre op: diarrhea  post op: polyps  PCP: Joe Velasco MD   • COLONOSCOPY N/A 10/13/2020    Procedure: COLONOSCOPY WITH ANESTHESIA;  Surgeon: Adrien Brewster MD;  Location: [facility] ENDOSCOPY;  Service: Gastroenterology;  Laterality: N/A;  Pre: Chronic Diarrhea, Crohn's  Post: AVM  Dr. Neftali Velasco  CO2 Inflation Used   • CORONARY ARTERY BYPASS GRAFT  2003    x3   • EYE SURGERY Bilateral     catorac   • INCISION AND DRAINAGE PERIRECTAL ABSCESS N/A 3/3/2017    Procedure: INCISION AND DRAINAGE OF JEET ANAL ABSCESS;  Surgeon: Lynette Smith MD;  Location: [facility] OR;  Service:    • MYRINGOTOMY W/ TUBES Left 04/17/2017    06/10/2016   • TONSILLECTOMY     • TOTAL HIP ARTHROPLASTY Right 2006        Current Outpatient Medications:   •  amLODIPine (NORVASC) 10 MG tablet, TAKE 1 TABLET DAILY, Disp: 90 tablet, Rfl: 3  •  aspirin (ASPIR) 81 MG EC tablet, Take 81 mg by mouth Daily., Disp: , Rfl:   •  Aspirin-Acetaminophen-Caffeine (EXCEDRIN EXTRA STRENGTH PO), Take 2 tablets by mouth Daily As Needed (HEADACHES)., Disp: , Rfl:   •  atorvastatin (LIPITOR) 10 MG tablet, Take 1 tablet by mouth Daily., Disp: 90 tablet, Rfl: 3  •  azelastine (ASTELIN) 0.1 % nasal spray, 1 spray into the nostril(s) as directed by provider 2 (Two) Times a Day. Use in each nostril as directed, Disp: 90 mL, Rfl: 3  •  cholestyramine (QUESTRAN) 4 g packet, Take 1 packet by mouth Daily., Disp: 90 packet, Rfl: 3  •  cloNIDine (CATAPRES) 0.2 MG tablet, TAKE  3 TABLETS DAILY, Disp: 270 tablet, Rfl: 3  •  clopidogrel (Plavix) 75 MG tablet, Take 1 tablet by mouth Daily., Disp: 90 tablet, Rfl: 3  •  Copper Gluconate 2 MG tablet, Take 2 mg by mouth Daily., Disp: 30 tablet, Rfl: 6  •  desoximetasone (TOPICORT) 0.25 % cream, APPLY TO THE AFFECTED AREA TWICE A DAY AS NEEDED, Disp: 30 g, Rfl: 3  •  diphenhydrAMINE (Benadryl Allergy) 25 mg capsule, Take 25 mg by mouth Every 6 (Six) Hours As Needed for Itching., Disp: , Rfl:   •  fexofenadine (ALLEGRA) 180 MG tablet, Take 180 mg by mouth Daily., Disp: , Rfl:   •  fluticasone (FLONASE) 50 MCG/ACT nasal spray, 2 sprays into the nostril(s) as directed by provider Daily As Needed for Rhinitis., Disp: , Rfl:   •  levothyroxine (Synthroid) 75 MCG tablet, Take 1 tablet by mouth Daily., Disp: 90 tablet, Rfl: 3  •  LUTEIN PO, Take 5 mg by mouth Daily., Disp: , Rfl:   •  magnesium chloride ER 64 MG DR tablet, Take 143 mg by mouth Daily., Disp: , Rfl:   •  Melatonin 10 MG capsule, Take 2 capsules by mouth Every Night., Disp: , Rfl:   •  mesalamine (APRISO) 0.375 g 24 hr capsule, Take 4 capsules by mouth Daily., Disp: 360 capsule, Rfl: 3  •  metoprolol succinate XL (TOPROL-XL) 25 MG 24 hr tablet, Take 1 tablet by mouth Daily., Disp: 90 tablet, Rfl: 3  •  Multiple Vitamins-Minerals (PRESERVISION/LUTEIN) capsule, Take 1 capsule by mouth 2 (two) times a day., Disp: , Rfl:   •  omeprazole (priLOSEC) 20 MG capsule, Take 1 capsule by mouth Daily., Disp: 90 capsule, Rfl: 3  •  potassium chloride 10 MEQ CR tablet, Take 1 tablet by mouth 2 (Two) Times a Day., Disp: 180 tablet, Rfl: 3  •  sodium bicarbonate 650 MG tablet, Take 1 tablet by mouth 2 (Two) Times a Day., Disp: 180 tablet, Rfl: 3  •  valsartan (DIOVAN) 160 MG tablet, Take 160 mg by mouth Daily., Disp: , Rfl:   •  vitamin D (ERGOCALCIFEROL) 1.25 MG (86129 UT) capsule capsule, Take 1 capsule by mouth 1 (One) Time Per Week., Disp: 5 capsule, Rfl: 11  •  albuterol sulfate  (90 Base)  MCG/ACT inhaler, USE 2 INHALATIONS FOUR TIMES A DAY AS NEEDED, Disp: 20.1 g, Rfl: 3  •  Fluticasone Furoate-Vilanterol (Breo Ellipta) 200-25 MCG/INH inhaler, Inhale 1 puff Daily for 14 days., Disp: 1 each, Rfl: 0  •  furosemide (Lasix) 20 MG tablet, Take 1 tablet by mouth Daily., Disp: 90 tablet, Rfl: 3  •  hydrALAZINE (APRESOLINE) 10 MG tablet, Take 1 tablet by mouth 3 (Three) Times a Day., Disp: 90 tablet, Rfl: 3     Vitals:    09/02/21 0817   BP: (!) 190/80   Pulse: 63   Temp: 97.9 °F (36.6 °C)   SpO2: 98%         09/02/21 0817   Weight: 76.7 kg (169 lb)     Body mass index is 22.92 kg/m².    Physical Exam  Vitals and nursing note reviewed.   Constitutional:       General: He is not in acute distress.     Appearance: Normal appearance. He is well-developed.   HENT:      Head: Normocephalic and atraumatic.      Right Ear: External ear normal.      Left Ear: External ear normal.      Nose: Nose normal.   Eyes:      Extraocular Movements: Extraocular movements intact.      Conjunctiva/sclera: Conjunctivae normal.      Pupils: Pupils are equal, round, and reactive to light.   Cardiovascular:      Rate and Rhythm: Normal rate and regular rhythm.      Pulses: Normal pulses.      Heart sounds: Normal heart sounds.   Pulmonary:      Effort: Pulmonary effort is normal.      Breath sounds: Normal breath sounds.   Abdominal:      General: Bowel sounds are normal.      Palpations: Abdomen is soft.   Musculoskeletal:         General: Normal range of motion.      Cervical back: Normal range of motion and neck supple. No rigidity. No muscular tenderness.   Skin:     General: Skin is warm and dry.   Neurological:      General: No focal deficit present.      Mental Status: He is alert and oriented to person, place, and time.   Psychiatric:         Mood and Affect: Mood normal.         Behavior: Behavior normal.               Assessment/Plan   Diagnoses and all orders for this visit:    1. Hypertension, benign (Primary)  -      Basic Metabolic Panel; Future    2. Crohn's disease of large intestine with other complication (CMS/HCC)    3. CKD (chronic kidney disease) stage 4, GFR 15-29 ml/min (CMS/HCC)    Other orders  -     furosemide (Lasix) 20 MG tablet; Take 1 tablet by mouth Daily.  Dispense: 90 tablet; Refill: 3  -     hydrALAZINE (APRESOLINE) 10 MG tablet; Take 1 tablet by mouth 3 (Three) Times a Day.  Dispense: 90 tablet; Refill: 3        Patient has very elevated blood pressure today 190/80 I rechecked that get the same basic reading.  He has stage IV chronic kidney disease as well as Crohn's disease.  The Crohn's disease seem to be acquired at this moment.  I did review a chest x-ray from yesterday that show pleural effusions that combined with the elevated blood pressure I think he needs to be back on his Lasix I want to bring him back short-term in about 2 weeks and recheck his blood pressure and I have asked him to have a BMP prior to that visit.  Crohn's disease seem to have been associated with his decline in renal function.  He also saw hematology yesterday for follow-up.  I have added hydralazine 3 times daily to try and get his blood pressure down hopefully he will tolerate that we will send a copy of the note in 2 weeks to renal to get their input.

## 2021-09-03 ENCOUNTER — INFUSION (OUTPATIENT)
Dept: ONCOLOGY | Facility: HOSPITAL | Age: 71
End: 2021-09-03

## 2021-09-03 VITALS
RESPIRATION RATE: 18 BRPM | OXYGEN SATURATION: 96 % | HEIGHT: 72 IN | BODY MASS INDEX: 22.89 KG/M2 | TEMPERATURE: 96.6 F | DIASTOLIC BLOOD PRESSURE: 61 MMHG | SYSTOLIC BLOOD PRESSURE: 180 MMHG | WEIGHT: 169 LBS | HEART RATE: 62 BPM

## 2021-09-03 DIAGNOSIS — D63.1 ANEMIA DUE TO STAGE 4 CHRONIC KIDNEY DISEASE (HCC): ICD-10-CM

## 2021-09-03 DIAGNOSIS — N18.4 ANEMIA DUE TO STAGE 4 CHRONIC KIDNEY DISEASE (HCC): ICD-10-CM

## 2021-09-03 DIAGNOSIS — N18.4 CRD (CHRONIC RENAL DISEASE), STAGE IV (HCC): Primary | ICD-10-CM

## 2021-09-03 PROCEDURE — 96372 THER/PROPH/DIAG INJ SC/IM: CPT

## 2021-09-03 PROCEDURE — 25010000002 EPOETIN ALFA-EPBX 40000 UNIT/ML SOLUTION: Performed by: NURSE PRACTITIONER

## 2021-09-03 RX ADMIN — EPOETIN ALFA-EPBX 40000 UNITS: 40000 INJECTION, SOLUTION INTRAVENOUS; SUBCUTANEOUS at 08:53

## 2021-09-07 RX ORDER — CHOLESTYRAMINE 4 G/9G
1 POWDER, FOR SUSPENSION ORAL DAILY
Qty: 30 EACH | Refills: 5 | Status: SHIPPED | OUTPATIENT
Start: 2021-09-07 | End: 2022-05-23

## 2021-09-07 RX ORDER — CLONIDINE HYDROCHLORIDE 0.2 MG/1
TABLET ORAL
Qty: 270 TABLET | Refills: 3 | Status: SHIPPED | OUTPATIENT
Start: 2021-09-07 | End: 2022-09-02 | Stop reason: SDUPTHER

## 2021-09-08 NOTE — PROGRESS NOTES
"Background:  pt w crohn's disease, pulm htn   Chief Complaint  pulmonary hypertension    Subjective    History of Present Illness       Ricardo Hugo presents to [facility].  Pt has hx dyspnea and findings of pulm htn on previous echocardiogram, moderate obstruction on PFT in June.  He has not had any decompensations, uses albuterol inhaler routinely about once a day.  I gave him some trelegy but he has not been using it.  He reports favorable echocardiogram elsewhere about 6 months ago.  He takes meds for crohns disease.     Objective     Vital Signs:   /84   Pulse 62   Ht 182.9 cm (72\")   Wt 76.2 kg (168 lb)   SpO2 97% Comment: RA  BMI 22.78 kg/m²   Physical Exam  Constitutional:       Appearance: Normal appearance. He is not ill-appearing or diaphoretic.   Eyes:      Extraocular Movements: Extraocular movements intact.   Cardiovascular:      Comments: p2 not accentuated  Pulmonary:      Effort: Pulmonary effort is normal. No respiratory distress.      Breath sounds: No wheezing, rhonchi or rales.   Skin:     Findings: No erythema or rash.   Neurological:      Mental Status: He is alert.        Result Review  Data Reviewed:{ Labs  Result Review  Imaging  Media :23}     XR Chest PA & Lateral (09/01/2021 16:03)    My interpretation of radiograph: small pleural effusions, mild increased perivascular markings.  PFT Values        Some values may be hidden. Unless noted otherwise, only the newest values recorded on each date are displayed.         Old Values PFT Results 6/8/21   No data to display.      Pre Drug PFT Results 6/8/21   FVC 71   FEV1 60   FEF 25-75% 32   FEV1/FVC 65.82      Post Drug PFT Results 6/8/21   No data to display.      Other Tests PFT Results 6/8/21         DLCO 36   D/VAsb 44                      Assessment and Plan  {CC Problem List  Visit Diagnosis  ROS  Review (Popup)  Health Maintenance  Quality  " BestPractice  Medications  SmartSets  SnapShot Encounters  Media :23}   Problem List Items Addressed This Visit     None      hold the trelegy, use if symptoms worsen  Continue albuterol  Call prn acute problems  Flu shot in the fall.    Follow Up {Instructions Charge Capture  Follow-up Communications :23}   No follow-ups on file.  Patient was given instructions and counseling regarding his condition or for health maintenance advice. Please see specific information pulled into the AVS if appropriate.    Electronically signed by [place] Omal[place] MD, 9/9/2021, 15:29 CDT

## 2021-09-09 ENCOUNTER — OFFICE VISIT (OUTPATIENT)
Dept: PULMONOLOGY | Facility: CLINIC | Age: 71
End: 2021-09-09

## 2021-09-09 VITALS
HEART RATE: 62 BPM | SYSTOLIC BLOOD PRESSURE: 162 MMHG | DIASTOLIC BLOOD PRESSURE: 84 MMHG | OXYGEN SATURATION: 97 % | HEIGHT: 72 IN | BODY MASS INDEX: 22.75 KG/M2 | WEIGHT: 168 LBS

## 2021-09-09 DIAGNOSIS — J44.9 CHRONIC OBSTRUCTIVE PULMONARY DISEASE, UNSPECIFIED COPD TYPE (HCC): ICD-10-CM

## 2021-09-09 DIAGNOSIS — I51.89 DIASTOLIC DYSFUNCTION: ICD-10-CM

## 2021-09-09 DIAGNOSIS — R06.02 SHORTNESS OF BREATH: ICD-10-CM

## 2021-09-09 DIAGNOSIS — I27.20 PULMONARY HYPERTENSION (HCC): Primary | ICD-10-CM

## 2021-09-09 PROCEDURE — 99213 OFFICE O/P EST LOW 20 MIN: CPT | Performed by: INTERNAL MEDICINE

## 2021-09-10 RX ORDER — ERGOCALCIFEROL 1.25 MG/1
50000 CAPSULE ORAL WEEKLY
Qty: 5 CAPSULE | Refills: 11 | Status: SHIPPED | OUTPATIENT
Start: 2021-09-10 | End: 2021-09-14 | Stop reason: SDUPTHER

## 2021-09-14 RX ORDER — ERGOCALCIFEROL 1.25 MG/1
50000 CAPSULE ORAL WEEKLY
Qty: 15 CAPSULE | Refills: 3 | Status: SHIPPED | OUTPATIENT
Start: 2021-09-14 | End: 2022-09-02 | Stop reason: SDUPTHER

## 2021-09-16 ENCOUNTER — OFFICE VISIT (OUTPATIENT)
Dept: INTERNAL MEDICINE | Facility: CLINIC | Age: 71
End: 2021-09-16

## 2021-09-16 VITALS
BODY MASS INDEX: 22.75 KG/M2 | SYSTOLIC BLOOD PRESSURE: 162 MMHG | HEART RATE: 60 BPM | HEIGHT: 72 IN | TEMPERATURE: 97.9 F | WEIGHT: 168 LBS | DIASTOLIC BLOOD PRESSURE: 78 MMHG | OXYGEN SATURATION: 100 %

## 2021-09-16 DIAGNOSIS — I10 HYPERTENSION, BENIGN: Primary | ICD-10-CM

## 2021-09-16 DIAGNOSIS — K50.118 CROHN'S DISEASE OF LARGE INTESTINE WITH OTHER COMPLICATION (HCC): ICD-10-CM

## 2021-09-16 DIAGNOSIS — N18.4 CKD (CHRONIC KIDNEY DISEASE) STAGE 4, GFR 15-29 ML/MIN (HCC): ICD-10-CM

## 2021-09-16 PROCEDURE — 99214 OFFICE O/P EST MOD 30 MIN: CPT | Performed by: INTERNAL MEDICINE

## 2021-09-16 NOTE — PROGRESS NOTES
Subjective   Ricardo Hugo is a 71 y.o. male.   Chief Complaint   Patient presents with   • Hypertension     2 week follow up        History of Present Illness patient is here for follow-up of his blood pressure apparently he only got a few tablets the pharmacy had run out of hydralazine.  So unfortunately he is not on full dose hydralazine his blood pressure is 162/78.    The following portions of the patient's history were reviewed and updated as appropriate: allergies, current medications, past family history, past medical history, past social history, past surgical history and problem list.    Review of Systems   Constitutional: Negative for activity change, appetite change, fatigue, fever, unexpected weight gain and unexpected weight loss.   HENT: Negative for swollen glands, trouble swallowing and voice change.    Eyes: Negative for blurred vision and visual disturbance.   Respiratory: Negative for cough and shortness of breath.    Cardiovascular: Negative for chest pain, palpitations and leg swelling.   Gastrointestinal: Negative for abdominal pain, constipation, diarrhea, nausea, vomiting and indigestion.   Endocrine: Negative for cold intolerance, heat intolerance, polydipsia and polyphagia.   Genitourinary: Negative for dysuria and frequency.   Musculoskeletal: Negative for arthralgias, back pain, joint swelling and neck pain.   Skin: Negative for color change, rash and skin lesions.   Neurological: Negative for dizziness, weakness, headache, memory problem and confusion.   Hematological: Does not bruise/bleed easily.   Psychiatric/Behavioral: Negative for agitation, hallucinations and suicidal ideas. The patient is not nervous/anxious.        Objective   Past Medical History:   Diagnosis Date   • 3-vessel CAD 8/11/2020   • Allergic rhinitis    • Anxiety disorder 4/27/2020   • Arthritis    • Asymmetrical sensorineural hearing loss 6/28/2017   • Atherosclerosis of native artery of both lower extremities  with intermittent claudication (CMS/[facility]) 7/18/2019   • Avascular necrosis of femoral head, left (CMS/[facility]) 07/11/2020    right hip after surgery   • Carotid stenosis    • Chronic mucoid otitis media    • Chronic rhinitis    • Coronary artery disease     HEART BYPASS 2004   • Crohn's disease of large intestine with other complication (CMS/[facility]) 7/30/2020    Chronic diarrhea Colonoscopy July 2020 revealed mild patchy scattered hemosiderin staining with inflammation more so in rectosigmoid area.  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   • Iron deficiency anemia 7/14/2020   • Mixed hearing loss of left ear    • PAD (peripheral artery disease) (CMS/[facility]) 8/11/2020   • Perianal abscess    • Pernicious anemia 08/17/2020    took shots but never diagnosed with b12 deficiency   • Personal history of alcoholism (CMS/[facility]) 08/11/2020    quit drinking in 2013   • Prostatic hypertrophy 8/11/2020   • Sensorineural hearing loss    • Sepsis with acute renal failure (CMS/[facility]) 9/15/2020   • Shortness of breath 5/27/2021   • Tinnitus    • Ventricular tachycardia, nonsustained (CMS/[facility]) 7/14/2020   • Weight loss 7/11/2020      Past Surgical History:   Procedure Laterality Date   • ARTERY SURGERY  2021    right side on neck   • CAROTID ENDARTERECTOMY Right 5/10/2021    Procedure: RIGHT CAROTID ENDARTERECTOMY WITH EEG;  Surgeon: Gil Pineda DO;  Location: [facility] OR 12;  Service: Vascular;  Laterality: Right;   • COLONOSCOPY N/A 7/2/2020    Procedure: COLONOSCOPY WITH ANESTHESIA;  Surgeon: Adrien Brewster MD;  Location: [facility] ENDOSCOPY;   Service: Gastroenterology;  Laterality: N/A;  pre op: diarrhea  post op: polyps  PCP: Joe Velasco MD   • COLONOSCOPY N/A 10/13/2020    Procedure: COLONOSCOPY WITH ANESTHESIA;  Surgeon: Adrien Brewster MD;  Location: [facility] ENDOSCOPY;  Service: Gastroenterology;  Laterality: N/A;  Pre: Chronic Diarrhea, Crohn's  Post: AVM  Dr. Neftali Velasco  CO2 Inflation Used   • CORONARY ARTERY BYPASS GRAFT  2003    x3   • EYE SURGERY Bilateral     catorac   • INCISION AND DRAINAGE PERIRECTAL ABSCESS N/A 3/3/2017    Procedure: INCISION AND DRAINAGE OF JEET ANAL ABSCESS;  Surgeon: Lynette Smith MD;  Location: [facility] OR;  Service:    • MYRINGOTOMY W/ TUBES Left 04/17/2017    06/10/2016   • TONSILLECTOMY     • TOTAL HIP ARTHROPLASTY Right 2006        Current Outpatient Medications:   •  albuterol sulfate  (90 Base) MCG/ACT inhaler, USE 2 INHALATIONS FOUR TIMES A DAY AS NEEDED, Disp: 20.1 g, Rfl: 3  •  amLODIPine (NORVASC) 10 MG tablet, TAKE 1 TABLET DAILY, Disp: 90 tablet, Rfl: 3  •  aspirin (ASPIR) 81 MG EC tablet, Take 81 mg by mouth Daily., Disp: , Rfl:   •  Aspirin-Acetaminophen-Caffeine (EXCEDRIN EXTRA STRENGTH PO), Take 2 tablets by mouth Daily As Needed (HEADACHES)., Disp: , Rfl:   •  atorvastatin (LIPITOR) 10 MG tablet, Take 1 tablet by mouth Daily., Disp: 90 tablet, Rfl: 3  •  azelastine (ASTELIN) 0.1 % nasal spray, 1 spray into the nostril(s) as directed by provider 2 (Two) Times a Day. Use in each nostril as directed, Disp: 90 mL, Rfl: 3  •  cholestyramine (QUESTRAN) 4 g packet, TAKE 1 PACKET BY MOUTH DAILY, Disp: 30 each, Rfl: 5  •  cloNIDine (CATAPRES) 0.2 MG tablet, TAKE 3 TABLETS DAILY, Disp: 270 tablet, Rfl: 3  •  clopidogrel (Plavix) 75 MG tablet, Take 1 tablet by mouth Daily., Disp: 90 tablet, Rfl: 3  •  Copper Gluconate 2 MG tablet, Take 2 mg by mouth Daily., Disp: 30 tablet, Rfl: 6  •  diphenhydrAMINE (Benadryl Allergy) 25 mg capsule, Take 25 mg by mouth Every 6 (Six) Hours As Needed for Itching.,  Disp: , Rfl:   •  fexofenadine (ALLEGRA) 180 MG tablet, Take 180 mg by mouth Daily., Disp: , Rfl:   •  fluticasone (FLONASE) 50 MCG/ACT nasal spray, 2 sprays into the nostril(s) as directed by provider Daily As Needed for Rhinitis., Disp: , Rfl:   •  furosemide (Lasix) 20 MG tablet, Take 1 tablet by mouth Daily., Disp: 90 tablet, Rfl: 3  •  levothyroxine (Synthroid) 75 MCG tablet, Take 1 tablet by mouth Daily., Disp: 90 tablet, Rfl: 3  •  magnesium chloride ER 64 MG DR tablet, Take 143 mg by mouth Daily., Disp: , Rfl:   •  Melatonin 10 MG capsule, Take 2 capsules by mouth Every Night., Disp: , Rfl:   •  mesalamine (APRISO) 0.375 g 24 hr capsule, Take 4 capsules by mouth Daily., Disp: 360 capsule, Rfl: 3  •  metoprolol succinate XL (TOPROL-XL) 25 MG 24 hr tablet, Take 1 tablet by mouth Daily., Disp: 90 tablet, Rfl: 3  •  Multiple Vitamins-Minerals (PRESERVISION/LUTEIN) capsule, Take 1 capsule by mouth 2 (two) times a day., Disp: , Rfl:   •  omeprazole (priLOSEC) 20 MG capsule, Take 1 capsule by mouth Daily., Disp: 90 capsule, Rfl: 3  •  potassium chloride 10 MEQ CR tablet, Take 1 tablet by mouth 2 (Two) Times a Day., Disp: 180 tablet, Rfl: 3  •  sodium bicarbonate 650 MG tablet, Take 1 tablet by mouth 2 (Two) Times a Day., Disp: 180 tablet, Rfl: 3  •  valsartan (DIOVAN) 160 MG tablet, Take 160 mg by mouth Daily., Disp: , Rfl:   •  vitamin D (ERGOCALCIFEROL) 1.25 MG (69384 UT) capsule capsule, Take 1 capsule by mouth 1 (One) Time Per Week., Disp: 15 capsule, Rfl: 3  •  hydrALAZINE (APRESOLINE) 10 MG tablet, Take 1 tablet by mouth 3 (Three) Times a Day., Disp: 90 tablet, Rfl: 3      Vitals:    09/16/21 0955   BP: 162/78   Pulse: 60   Temp: 97.9 °F (36.6 °C)   SpO2: 100%         09/16/21 0955   Weight: 76.2 kg (168 lb)       Body mass index is 22.78 kg/m².    Physical Exam  Vitals and nursing note reviewed.   Constitutional:       General: He is not in acute distress.     Appearance: Normal appearance. He is  well-developed.   HENT:      Head: Normocephalic and atraumatic.      Right Ear: External ear normal.      Left Ear: External ear normal.      Nose: Nose normal.   Eyes:      Extraocular Movements: Extraocular movements intact.      Conjunctiva/sclera: Conjunctivae normal.      Pupils: Pupils are equal, round, and reactive to light.   Cardiovascular:      Rate and Rhythm: Normal rate and regular rhythm.      Pulses: Normal pulses.      Heart sounds: Normal heart sounds.   Pulmonary:      Effort: Pulmonary effort is normal.      Breath sounds: Normal breath sounds.   Abdominal:      General: Bowel sounds are normal.      Palpations: Abdomen is soft.   Musculoskeletal:         General: Normal range of motion.      Cervical back: Normal range of motion and neck supple. No rigidity. No muscular tenderness.   Skin:     General: Skin is warm and dry.   Neurological:      General: No focal deficit present.      Mental Status: He is alert and oriented to person, place, and time.   Psychiatric:         Mood and Affect: Mood normal.         Behavior: Behavior normal.               Assessment/Plan   Diagnoses and all orders for this visit:    1. Hypertension, benign (Primary)    2. Crohn's disease of large intestine with other complication (CMS/[facility])    3. CKD (chronic kidney disease) stage 4, GFR 15-29 ml/min (CMS/[facility])      Unfortunately at today's visit we did not get a good picture of the patient's response to blood pressure medication.  His hydralazine he was only given proximate 14 tablets that he ran out of those few days ago.  I had to call the pharmacy check with them apparently they did not have enough medication they however tell us that he came back later and  his additional prescription he says he did not.  They agreed to check the video to see what is going on here at any rate he needs to be on his hydralazine 3 times daily.  I am asking him to come back and have a blood pressure check in a couple weeks.  He  is developed significant chronic kidney disease associated with the acute Crohn's disease and dehydration the shock that he had had around that time his BMP was reviewed today his renal function appears to be stable.

## 2021-09-21 ENCOUNTER — OFFICE VISIT (OUTPATIENT)
Dept: GASTROENTEROLOGY | Facility: CLINIC | Age: 71
End: 2021-09-21

## 2021-09-21 VITALS
WEIGHT: 168 LBS | DIASTOLIC BLOOD PRESSURE: 68 MMHG | HEART RATE: 62 BPM | TEMPERATURE: 97.7 F | OXYGEN SATURATION: 99 % | SYSTOLIC BLOOD PRESSURE: 194 MMHG | HEIGHT: 72 IN | BODY MASS INDEX: 22.75 KG/M2

## 2021-09-21 DIAGNOSIS — K50.118 CROHN'S DISEASE OF LARGE INTESTINE WITH OTHER COMPLICATION (HCC): Primary | ICD-10-CM

## 2021-09-21 PROCEDURE — 99213 OFFICE O/P EST LOW 20 MIN: CPT | Performed by: INTERNAL MEDICINE

## 2021-09-21 NOTE — PROGRESS NOTES
[facility] Gastroenterology    Chief Complaint   Patient presents with   • Crohn's Disease       Subjective     HPI    Ricardo Hugo is a 71 y.o. male who presents with a chief complaint of Crohn's.    He has a history of chronic diarrhea but that is resolved.  He states he has a bowel movement daily or every other day.  He continues on cholestyramine 1 packet daily.  He also has a history of Crohn's disease.  This was first noted with mild inflammation in the rectosigmoid area.  [facility] lab suggested underlying Crohn's.  Follow-up colonoscopy last November showed no evidence of Crohn's.  It was incomplete remission.  He is done quite well since that time.  His diarrhea is resolved.  He denies any abdominal discomfort.  Denies any blood in stools.  Denies melena or bright red blood per rectum.  Has had no change in bowel habits.  Weights been stable.    Of note, he did tell me he had to get a transfusion this past early summer.  He was told he may be losing blood in his stools.  He also scheduled to begin Procrit shots.  He does have a history of colonic AVMs and is on Plavix.  Denies any dyspepsia, nausea or abdominal discomfort.  He does take Aleve occasionally.  He takes Aleve PM to help him sleep.  Denies heartburn or dysphagia.      ============= copy of [facility] March 2021==============================  HPI     Ricardo Hugo is a 71 y.o. male who presents with a chief complaint of Crohn's.        When he was here in November he was asymptomatic on Apriso.  We have talked about options.  He chose to stay with Apriso.  He also takes 1 packet of cholestyramine daily.     He tells me he is still doing well.  Denies diarrhea.  States he averages 1 bowel movement every other day.  Is formed.  Denies any bloody mucus.  No abdominal cramping.  Continues with Apriso and 1 packet of cholestyramine daily.  He did have labs done March 11.  I reviewed those with him.  His creatinine is 2.22 which is down a little  bit.  Hemoglobin slightly higher at 9.7 than what it was in September at 8.5.  He does continue to follow with nephrology tells me.  He recently had an ultrasound Doppler of his neck and was found to have 70% stenosis in one of his carotids.  He is scheduled to see Dr. Pineda from vascular in a week.           ================================================ November 2020 HPI= 9=================  HPI     Ricardo Hugo is a 70 y.o. male who presents with a chief complaint of Crohn's     He did undergo colonoscopy in October.  See his summary of the pathology and copy of the impression from the colonoscopy below.  Overall it appeared improved.  There is no gross visual evidence of obvious Crohn's disease.  Random biopsies throughout the colon did show evidence of microscopic mild inflammation throughout with mild to moderate in the sigmoid colon.      Currently he feels well.  States he is having 1 formed brown bowel movement a day.  He takes Questran 1 packet daily.  He continues on Apriso 4 tablets daily.  Denies bloody mucus.  Denies cramps.  He has good appetite.  He is eating what he wants to.  He is active.  He has no complaints.  He does tell me he continues to follow-up with nephrology.  He has been told his renal function has been stable.  He is drinking his Gatorade once daily.  He has a follow-up appointment with renal after the holidays.     Colonoscopy October 13, 2020 impression  - The examined portion of the ileum was normal.  - A single non-bleeding colonic angioectasia.  - hemosiderin staining, minimum and improved from July's exam  - The examination was otherwise normal on direct and retroflexion views.  - Diverticulosis in the sigmoid colon.  - Biopsies were taken with a cold forceps from the ascending colon, transverse colon, 40cm, 20cm and  rectum for evaluation of microscopic colitis.  - - No visual evidence of active Crohn's     Pathology showed mild active inflammation on the random  biopsies with mild to moderate in the sigmoid colon.        ======================== September 29, 2020 HPI================================  HPI     Ricardo Hugo is a 70 y.o. male who presents with a chief complaint of Crohn's and diarrhea.     He was in the hospital mid-September.  He was there with UTI and acute renal failure.  They attributed his acute renal failure to diarrhea.  But similar to when he was in the hospital in August he told me he was not having that much diarrhea.  When I saw him there in September he states he was maybe having 2-3 bowel movements at most.  He states they were controlled with Questran but he was only taking it once a day.  Previously in August he was not taking the Apriso except for 1 tablet daily.  When we saw him in September he was taken the Apriso 4 tablets daily.  He was diagnosed with urinary tract infection and felt to be may be prostatitis.  He is seeing nephrology.     He comes in for follow-up visit.  He states he is having 1 bowel movement a day.  He describes this as soft mud.  There is no blood or mucus.  No nocturnal bowel movements.  No abdominal cramping.  He is eating well.  He finished his Cipro for his UTI type symptoms 2 days ago.  He saw his primary care provider earlier today who felt everything was stable.  He is due to see nephrology tomorrow.  He is now drinking 2 bottles of Gatorade a day to help with hydration.     He continues on Apriso 4 tablets daily and is taking Questran 1 packet twice a day.       Past Medical History:   Diagnosis Date   • 3-vessel CAD 8/11/2020   • Allergic rhinitis    • Anxiety disorder 4/27/2020   • Arthritis    • Asymmetrical sensorineural hearing loss 6/28/2017   • Atherosclerosis of native artery of both lower extremities with intermittent claudication (CMS/[facility]) 7/18/2019   • Avascular necrosis of femoral head, left (CMS/[facility]) 07/11/2020    right hip after surgery   • Carotid stenosis    • Chronic mucoid otitis media    •  Chronic rhinitis    • Coronary artery disease     HEART BYPASS 2004   • Crohn's disease of large intestine with other complication (CMS/[facility]) 7/30/2020    Chronic diarrhea Colonoscopy July 2020 revealed mild patchy scattered hemosiderin staining with inflammation more so in rectosigmoid area.  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   • Iron deficiency anemia 7/14/2020   • Mixed hearing loss of left ear    • PAD (peripheral artery disease) (CMS/[facility]) 8/11/2020   • Perianal abscess    • Pernicious anemia 08/17/2020    took shots but never diagnosed with b12 deficiency   • Personal history of alcoholism (CMS/[facility]) 08/11/2020    quit drinking in 2013   • Prostatic hypertrophy 8/11/2020   • Sensorineural hearing loss    • Sepsis with acute renal failure (CMS/[facility]) 9/15/2020   • Shortness of breath 5/27/2021   • Tinnitus    • Ventricular tachycardia, nonsustained (CMS/[facility]) 7/14/2020   • Weight loss 7/11/2020       Past Surgical History:   Procedure Laterality Date   • ARTERY SURGERY  2021    right side on neck   • CAROTID ENDARTERECTOMY Right 5/10/2021    Procedure: RIGHT CAROTID ENDARTERECTOMY WITH EEG;  Surgeon: Gil Pineda DO;  Location: [facility] OR 12;  Service: Vascular;  Laterality: Right;   • COLONOSCOPY N/A 7/2/2020    Procedure: COLONOSCOPY WITH ANESTHESIA;  Surgeon: Adrien Brewster MD;  Location: [facility] ENDOSCOPY;  Service: Gastroenterology;  Laterality: N/A;  pre op: diarrhea  post op: polyps  PCP: Joe Velasco MD   • COLONOSCOPY N/A 10/13/2020    Procedure: COLONOSCOPY WITH ANESTHESIA;  Surgeon: Adrien Brewster  MD;  Location: [facility] ENDOSCOPY;  Service: Gastroenterology;  Laterality: N/A;  Pre: Chronic Diarrhea, Crohn's  Post: AVM  Dr. Neftali Velasco  CO2 Inflation Used   • CORONARY ARTERY BYPASS GRAFT  2003    x3   • EYE SURGERY Bilateral     catorac   • INCISION AND DRAINAGE PERIRECTAL ABSCESS N/A 3/3/2017    Procedure: INCISION AND DRAINAGE OF JEET ANAL ABSCESS;  Surgeon: Lynette Smith MD;  Location: [facility] OR;  Service:    • MYRINGOTOMY W/ TUBES Left 04/17/2017    06/10/2016   • TONSILLECTOMY     • TOTAL HIP ARTHROPLASTY Right 2006         Current Outpatient Medications:   •  albuterol sulfate  (90 Base) MCG/ACT inhaler, USE 2 INHALATIONS FOUR TIMES A DAY AS NEEDED, Disp: 20.1 g, Rfl: 3  •  amLODIPine (NORVASC) 10 MG tablet, TAKE 1 TABLET DAILY, Disp: 90 tablet, Rfl: 3  •  aspirin (ASPIR) 81 MG EC tablet, Take 81 mg by mouth Daily., Disp: , Rfl:   •  Aspirin-Acetaminophen-Caffeine (EXCEDRIN EXTRA STRENGTH PO), Take 2 tablets by mouth Daily As Needed (HEADACHES)., Disp: , Rfl:   •  atorvastatin (LIPITOR) 10 MG tablet, Take 1 tablet by mouth Daily., Disp: 90 tablet, Rfl: 3  •  azelastine (ASTELIN) 0.1 % nasal spray, 1 spray into the nostril(s) as directed by provider 2 (Two) Times a Day. Use in each nostril as directed, Disp: 90 mL, Rfl: 3  •  cholestyramine (QUESTRAN) 4 g packet, TAKE 1 PACKET BY MOUTH DAILY, Disp: 30 each, Rfl: 5  •  cloNIDine (CATAPRES) 0.2 MG tablet, TAKE 3 TABLETS DAILY, Disp: 270 tablet, Rfl: 3  •  clopidogrel (Plavix) 75 MG tablet, Take 1 tablet by mouth Daily., Disp: 90 tablet, Rfl: 3  •  Copper Gluconate 2 MG tablet, Take 2 mg by mouth Daily., Disp: 30 tablet, Rfl: 6  •  diphenhydrAMINE (Benadryl Allergy) 25 mg capsule, Take 25 mg by mouth Every 6 (Six) Hours As Needed for Itching., Disp: , Rfl:   •  DIPHENOXYLATE-ATROPINE PO, Take  by mouth., Disp: , Rfl:   •  fexofenadine (ALLEGRA) 180 MG tablet, Take 180 mg by mouth Daily., Disp: , Rfl:   •  fluticasone (FLONASE) 50 MCG/ACT nasal spray,  2 sprays into the nostril(s) as directed by provider Daily As Needed for Rhinitis., Disp: , Rfl:   •  furosemide (Lasix) 20 MG tablet, Take 1 tablet by mouth Daily., Disp: 90 tablet, Rfl: 3  •  hydrALAZINE (APRESOLINE) 10 MG tablet, Take 1 tablet by mouth 3 (Three) Times a Day., Disp: 90 tablet, Rfl: 3  •  levothyroxine (Synthroid) 75 MCG tablet, Take 1 tablet by mouth Daily., Disp: 90 tablet, Rfl: 3  •  magnesium chloride ER 64 MG DR tablet, Take 143 mg by mouth Daily., Disp: , Rfl:   •  Melatonin 10 MG capsule, Take 2 capsules by mouth Every Night., Disp: , Rfl:   •  mesalamine (APRISO) 0.375 g 24 hr capsule, Take 4 capsules by mouth Daily., Disp: 360 capsule, Rfl: 3  •  metoprolol succinate XL (TOPROL-XL) 25 MG 24 hr tablet, Take 1 tablet by mouth Daily., Disp: 90 tablet, Rfl: 3  •  Multiple Vitamins-Minerals (PRESERVISION/LUTEIN) capsule, Take 1 capsule by mouth 2 (two) times a day., Disp: , Rfl:   •  omeprazole (priLOSEC) 20 MG capsule, Take 1 capsule by mouth Daily., Disp: 90 capsule, Rfl: 3  •  potassium chloride 10 MEQ CR tablet, Take 1 tablet by mouth 2 (Two) Times a Day., Disp: 180 tablet, Rfl: 3  •  sodium bicarbonate 650 MG tablet, Take 1 tablet by mouth 2 (Two) Times a Day., Disp: 180 tablet, Rfl: 3  •  valsartan (DIOVAN) 160 MG tablet, Take 160 mg by mouth Daily., Disp: , Rfl:   •  vitamin D (ERGOCALCIFEROL) 1.25 MG (01308 UT) capsule capsule, Take 1 capsule by mouth 1 (One) Time Per Week., Disp: 15 capsule, Rfl: 3    Allergies   Allergen Reactions   • Lortab [Hydrocodone-Acetaminophen] Other (See Comments) and Hallucinations     CLOSTROPHOBIC   • Allopurinol Other (See Comments)     Pain on right side       Social History     Socioeconomic History   • Marital status:      Spouse name: Not on file   • Number of children: Not on file   • Years of education: Not on file   • Highest education level: Not on file   Tobacco Use   • Smoking status: Former Smoker     Packs/day: 0.50     Years: 25.00      Pack years: 12.50     Types: Cigarettes     Start date:      Quit date: 10/13/2013     Years since quittin.9   • Smokeless tobacco: Never Used   • Tobacco comment: quit 2013   Vaping Use   • Vaping Use: Never used   Substance and Sexual Activity   • Alcohol use: Yes     Alcohol/week: 5.0 standard drinks     Types: 5 Cans of beer per week     Comment: rare- very little, 5 beers a week but previously was an alcoholic when he was drinking 10 beers a day and was also drinking vodka   • Drug use: No   • Sexual activity: Defer       Family History   Problem Relation Age of Onset   • No Known Problems Mother    • No Known Problems Father    • No Known Problems Daughter    • Colon cancer Neg Hx    • Colon polyps Neg Hx        Review of Systems  No abdominal discomfort, no history of peptic ulcer disease, no heartburn    Objective     Vitals:    21 1337   BP: (!) 194/68   Pulse: 62   Temp: 97.7 °F (36.5 °C)   SpO2: 99%       Physical Exam  No acute distress. Vital signs as documented.  Sclera anicteric.  Neck without noticeable JVD. Lungs clear to auscultation. Heart exam notable for regular rhythm, normal sounds. Abdomen is soft, nontender, non distended, normal bowel sounds and without evidence of organomegaly, masses.  Neuro alert, moves extremities.        Assessment/Plan   Problem List Items Addressed This Visit        Gastrointestinal Abdominal     Crohn's disease of large intestine with other complication (CMS/HCC) - Primary    Overview     Chronic diarrhea Colonoscopy 2020 revealed mild patchy scattered hemosiderin staining with inflammation more so in rectosigmoid area.  [facility] lab IBD first step consistent with Crohn's.  Colonoscopy 2020 showed no active disease but pathology did show microscopic inflammation.  Asymptomatic on Apriso    T spot and hepatitis panel -2020                 First, regarding his history of Crohn's, he only had minimal inflammation at most.   His chronic diarrhea is resolved.  He has no evidence of active Crohn's.  Recommend continue him on Apriso.  Continue cholestyramine.    He does have a history of anemia.  It appears that this is multifactorial.  He follows with hematology.  From a GI standpoint he has known AVM disease and is on chronic anticoagulation which would contribute to chronic blood loss anemia.  He has no upper intestinal symptoms.  We talked about pursuing upper endoscopy but without any symptoms or any signs, he does not wish to pursue upper endoscopy and I think that is reasonable.  I did ask him to stop Aleve p.m. to help him sleep and if needed something he can try Tylenol PM instead.  I suggest he avoid all NSAIDs for multiple medical reasons as discussed.    I will see him back in the office in 1 year.  Sooner if needed.    Continue ongoing management by primary care provider and other specialists.     Body mass index is 22.78 kg/m².  Normal BMI, no GI intervention recommended      EMR Dragon/transcription disclaimer:  Much of this encounter note is electronic transcription/translation of spoken language to printed text.  The electronic translation of spoken language may be erroneous, or at times, nonsensical words or phrases may be inadvertently transcribed.  Although I have reviewed the note for such errors, some may still exist.    Adrien Brewster MD  14:49 CDT  09/21/21

## 2021-09-28 ENCOUNTER — TELEPHONE (OUTPATIENT)
Dept: INTERNAL MEDICINE | Facility: CLINIC | Age: 71
End: 2021-09-28

## 2021-09-28 NOTE — TELEPHONE ENCOUNTER
Caller: Ricardo Hugo    Relationship to patient: Self    Best call back number:  [phone]     Patient is needing: Pt is needing a letter from provider regarding an ambulance transport on July 11, 2020  - he stated that his neighbor came and found him on the floor and an ambulance had to be called. His medicare is deeming this non-emergent transportation. He states that he spent a few days admitted to the hospital due to this incident and his kidney failure. Pt is needing a letter stating that this transportation was medically necessary.

## 2021-09-29 NOTE — TELEPHONE ENCOUNTER
Brief letter typed, per Dr. Velasco's dictation, and copy of discharge summary will be attached to letter.  Will inform pt that he may pick this up today.

## 2021-10-01 ENCOUNTER — LAB (OUTPATIENT)
Dept: LAB | Facility: HOSPITAL | Age: 71
End: 2021-10-01

## 2021-10-01 ENCOUNTER — INFUSION (OUTPATIENT)
Dept: ONCOLOGY | Facility: HOSPITAL | Age: 71
End: 2021-10-01

## 2021-10-01 VITALS
TEMPERATURE: 97.7 F | OXYGEN SATURATION: 98 % | BODY MASS INDEX: 22.89 KG/M2 | SYSTOLIC BLOOD PRESSURE: 184 MMHG | RESPIRATION RATE: 18 BRPM | HEART RATE: 59 BPM | WEIGHT: 169 LBS | DIASTOLIC BLOOD PRESSURE: 58 MMHG | HEIGHT: 72 IN

## 2021-10-01 DIAGNOSIS — D63.1 ANEMIA DUE TO STAGE 4 CHRONIC KIDNEY DISEASE (HCC): ICD-10-CM

## 2021-10-01 DIAGNOSIS — D63.1 ANEMIA DUE TO STAGE 4 CHRONIC KIDNEY DISEASE (HCC): Primary | ICD-10-CM

## 2021-10-01 DIAGNOSIS — N18.4 ANEMIA DUE TO STAGE 4 CHRONIC KIDNEY DISEASE (HCC): ICD-10-CM

## 2021-10-01 DIAGNOSIS — N18.4 ANEMIA DUE TO STAGE 4 CHRONIC KIDNEY DISEASE (HCC): Primary | ICD-10-CM

## 2021-10-01 DIAGNOSIS — N18.4 CRD (CHRONIC RENAL DISEASE), STAGE IV (HCC): Primary | ICD-10-CM

## 2021-10-01 LAB
BASOPHILS # BLD AUTO: 0.06 10*3/MM3 (ref 0–0.2)
BASOPHILS NFR BLD AUTO: 0.9 % (ref 0–1.5)
DEPRECATED RDW RBC AUTO: 54.4 FL (ref 37–54)
EOSINOPHIL # BLD AUTO: 0.14 10*3/MM3 (ref 0–0.4)
EOSINOPHIL NFR BLD AUTO: 2 % (ref 0.3–6.2)
ERYTHROCYTE [DISTWIDTH] IN BLOOD BY AUTOMATED COUNT: 15.9 % (ref 12.3–15.4)
HCT VFR BLD AUTO: 32.9 % (ref 37.5–51)
HGB BLD-MCNC: 10.6 G/DL (ref 13–17.7)
HOLD SPECIMEN: NORMAL
IMM GRANULOCYTES # BLD AUTO: 0.04 10*3/MM3 (ref 0–0.05)
IMM GRANULOCYTES NFR BLD AUTO: 0.6 % (ref 0–0.5)
LYMPHOCYTES # BLD AUTO: 1.58 10*3/MM3 (ref 0.7–3.1)
LYMPHOCYTES NFR BLD AUTO: 22.7 % (ref 19.6–45.3)
MCH RBC QN AUTO: 30.4 PG (ref 26.6–33)
MCHC RBC AUTO-ENTMCNC: 32.2 G/DL (ref 31.5–35.7)
MCV RBC AUTO: 94.3 FL (ref 79–97)
MONOCYTES # BLD AUTO: 0.67 10*3/MM3 (ref 0.1–0.9)
MONOCYTES NFR BLD AUTO: 9.6 % (ref 5–12)
NEUTROPHILS NFR BLD AUTO: 4.48 10*3/MM3 (ref 1.7–7)
NEUTROPHILS NFR BLD AUTO: 64.2 % (ref 42.7–76)
NRBC BLD AUTO-RTO: 0 /100 WBC (ref 0–0.2)
PLATELET # BLD AUTO: 123 10*3/MM3 (ref 140–450)
PMV BLD AUTO: 9.5 FL (ref 6–12)
RBC # BLD AUTO: 3.49 10*6/MM3 (ref 4.14–5.8)
WBC # BLD AUTO: 6.97 10*3/MM3 (ref 3.4–10.8)

## 2021-10-01 PROCEDURE — 36415 COLL VENOUS BLD VENIPUNCTURE: CPT

## 2021-10-01 PROCEDURE — 96372 THER/PROPH/DIAG INJ SC/IM: CPT

## 2021-10-01 PROCEDURE — 85025 COMPLETE CBC W/AUTO DIFF WBC: CPT

## 2021-10-01 PROCEDURE — 25010000002 EPOETIN ALFA-EPBX 40000 UNIT/ML SOLUTION: Performed by: NURSE PRACTITIONER

## 2021-10-01 RX ADMIN — EPOETIN ALFA-EPBX 40000 UNITS: 40000 INJECTION, SOLUTION INTRAVENOUS; SUBCUTANEOUS at 09:14

## 2021-10-12 ENCOUNTER — CLINICAL SUPPORT (OUTPATIENT)
Dept: INTERNAL MEDICINE | Facility: CLINIC | Age: 71
End: 2021-10-12

## 2021-10-12 PROCEDURE — G0008 ADMIN INFLUENZA VIRUS VAC: HCPCS | Performed by: INTERNAL MEDICINE

## 2021-10-12 PROCEDURE — 90662 IIV NO PRSV INCREASED AG IM: CPT | Performed by: INTERNAL MEDICINE

## 2021-10-25 ENCOUNTER — OFFICE VISIT (OUTPATIENT)
Dept: INTERNAL MEDICINE | Facility: CLINIC | Age: 71
End: 2021-10-25

## 2021-10-25 VITALS
HEART RATE: 71 BPM | BODY MASS INDEX: 21.4 KG/M2 | DIASTOLIC BLOOD PRESSURE: 52 MMHG | OXYGEN SATURATION: 97 % | TEMPERATURE: 97.9 F | SYSTOLIC BLOOD PRESSURE: 130 MMHG | HEIGHT: 72 IN | WEIGHT: 158 LBS

## 2021-10-25 DIAGNOSIS — N18.4 CKD (CHRONIC KIDNEY DISEASE) STAGE 4, GFR 15-29 ML/MIN (HCC): ICD-10-CM

## 2021-10-25 DIAGNOSIS — I10 HYPERTENSION, BENIGN: ICD-10-CM

## 2021-10-25 DIAGNOSIS — F41.9 ANXIETY: Primary | ICD-10-CM

## 2021-10-25 PROCEDURE — 99214 OFFICE O/P EST MOD 30 MIN: CPT | Performed by: INTERNAL MEDICINE

## 2021-10-25 RX ORDER — HYDRALAZINE HYDROCHLORIDE 25 MG/1
25 TABLET, FILM COATED ORAL 3 TIMES DAILY
Qty: 90 TABLET | Refills: 5 | Status: SHIPPED | OUTPATIENT
Start: 2021-10-25 | End: 2022-04-18 | Stop reason: SDUPTHER

## 2021-10-25 RX ORDER — ALPRAZOLAM 0.25 MG/1
0.25 TABLET ORAL NIGHTLY PRN
Qty: 30 TABLET | Refills: 3 | Status: SHIPPED | OUTPATIENT
Start: 2021-10-25 | End: 2022-02-25 | Stop reason: SDUPTHER

## 2021-10-25 NOTE — PROGRESS NOTES
Subjective   Ricardo Hugo is a 71 y.o. male.   Chief Complaint   Patient presents with   • Hypertension     3 month follow up; took an extra hydralazine this morning.     • Insomnia     pt has trouble with both going and staying asleep. wonders about trying xanax ;  cant shut mind off. but denies any issue with worrying etc        History of Present Illness patient is here for routine follow-up he states his blood pressure is better today because he took an extra hydralazine.  On most of his visits his blood pressures been high.  He saw a renal for lab work last Friday and is seeing them again this Friday.  I do not have that lab work available I checked his chart.    The following portions of the patient's history were reviewed and updated as appropriate: allergies, current medications, past family history, past medical history, past social history, past surgical history and problem list.    Review of Systems   Constitutional: Negative for activity change, appetite change, fatigue, fever, unexpected weight gain and unexpected weight loss.   HENT: Negative for swollen glands, trouble swallowing and voice change.    Eyes: Negative for blurred vision and visual disturbance.   Respiratory: Negative for cough and shortness of breath.    Cardiovascular: Negative for chest pain, palpitations and leg swelling.   Gastrointestinal: Negative for abdominal pain, constipation, diarrhea, nausea, vomiting and indigestion.   Endocrine: Negative for cold intolerance, heat intolerance, polydipsia and polyphagia.   Genitourinary: Negative for dysuria and frequency.   Musculoskeletal: Negative for arthralgias, back pain, joint swelling and neck pain.   Skin: Negative for color change, rash and skin lesions.   Neurological: Negative for dizziness, weakness, headache, memory problem and confusion.   Hematological: Does not bruise/bleed easily.   Psychiatric/Behavioral: Negative for agitation, hallucinations and suicidal ideas. The  patient is not nervous/anxious.        Objective   Past Medical History:   Diagnosis Date   • 3-vessel CAD 8/11/2020   • Allergic rhinitis    • Anxiety disorder 4/27/2020   • Arthritis    • Asymmetrical sensorineural hearing loss 6/28/2017   • Atherosclerosis of native artery of both lower extremities with intermittent claudication ([facility]) 7/18/2019   • Avascular necrosis of femoral head, left ([facility]) 07/11/2020    right hip after surgery   • Carotid stenosis    • Chronic mucoid otitis media    • Chronic rhinitis    • Coronary artery disease     HEART BYPASS 2004   • Crohn's disease of large intestine with other complication ([facility]) 7/30/2020    Chronic diarrhea Colonoscopy July 2020 revealed mild patchy scattered hemosiderin staining with inflammation more so in rectosigmoid area.  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   • Iron deficiency anemia 7/14/2020   • Mixed hearing loss of left ear    • PAD (peripheral artery disease) ([facility]) 8/11/2020   • Perianal abscess    • Pernicious anemia 08/17/2020    took shots but never diagnosed with b12 deficiency   • Personal history of alcoholism ([facility]) 08/11/2020    quit drinking in 2013   • Prostatic hypertrophy 8/11/2020   • Sensorineural hearing loss    • Sepsis with acute renal failure ([facility]) 9/15/2020   • Shortness of breath 5/27/2021   • Tinnitus    • Ventricular tachycardia, nonsustained ([facility]) 7/14/2020   • Weight loss 7/11/2020      Past Surgical History:   Procedure Laterality Date   • ARTERY SURGERY  2021    right side on neck   • CAROTID ENDARTERECTOMY Right 5/10/2021    Procedure: RIGHT  CAROTID ENDARTERECTOMY WITH EEG;  Surgeon: Gil Pineda DO;  Location: [facility] HYBRID OR 12;  Service: Vascular;  Laterality: Right;   • COLONOSCOPY N/A 7/2/2020    Procedure: COLONOSCOPY WITH ANESTHESIA;  Surgeon: Adrien Brewster MD;  Location: [facility] ENDOSCOPY;  Service: Gastroenterology;  Laterality: N/A;  pre op: diarrhea  post op: polyps  PCP: Joe Velasco MD   • COLONOSCOPY N/A 10/13/2020    Procedure: COLONOSCOPY WITH ANESTHESIA;  Surgeon: Adrien Brewster MD;  Location: [facility] ENDOSCOPY;  Service: Gastroenterology;  Laterality: N/A;  Pre: Chronic Diarrhea, Crohn's  Post: AVM  Dr. Neftali Velasco  CO2 Inflation Used   • CORONARY ARTERY BYPASS GRAFT  2003    x3   • EYE SURGERY Bilateral     catorac   • INCISION AND DRAINAGE PERIRECTAL ABSCESS N/A 3/3/2017    Procedure: INCISION AND DRAINAGE OF JEET ANAL ABSCESS;  Surgeon: Lynette Smith MD;  Location: [facility] OR;  Service:    • MYRINGOTOMY W/ TUBES Left 04/17/2017    06/10/2016   • TONSILLECTOMY     • TOTAL HIP ARTHROPLASTY Right 2006        Current Outpatient Medications:   •  albuterol sulfate  (90 Base) MCG/ACT inhaler, USE 2 INHALATIONS FOUR TIMES A DAY AS NEEDED, Disp: 20.1 g, Rfl: 3  •  amLODIPine (NORVASC) 10 MG tablet, TAKE 1 TABLET DAILY, Disp: 90 tablet, Rfl: 3  •  aspirin (ASPIR) 81 MG EC tablet, Take 81 mg by mouth Daily., Disp: , Rfl:   •  Aspirin-Acetaminophen-Caffeine (EXCEDRIN EXTRA STRENGTH PO), Take 2 tablets by mouth Daily As Needed (HEADACHES)., Disp: , Rfl:   •  atorvastatin (LIPITOR) 10 MG tablet, Take 1 tablet by mouth Daily., Disp: 90 tablet, Rfl: 3  •  azelastine (ASTELIN) 0.1 % nasal spray, 1 spray into the nostril(s) as directed by provider 2 (Two) Times a Day. Use in each nostril as directed, Disp: 90 mL, Rfl: 3  •  cholestyramine (QUESTRAN) 4 g packet, TAKE 1 PACKET BY MOUTH DAILY, Disp: 30 each, Rfl: 5  •  cloNIDine (CATAPRES) 0.2 MG tablet, TAKE 3 TABLETS DAILY, Disp: 270 tablet, Rfl: 3  •  clopidogrel (Plavix)  75 MG tablet, Take 1 tablet by mouth Daily., Disp: 90 tablet, Rfl: 3  •  Copper Gluconate 2 MG tablet, Take 2 mg by mouth Daily., Disp: 30 tablet, Rfl: 6  •  diphenhydrAMINE (Benadryl Allergy) 25 mg capsule, Take 25 mg by mouth Every 6 (Six) Hours As Needed for Itching., Disp: , Rfl:   •  DIPHENOXYLATE-ATROPINE PO, Take  by mouth., Disp: , Rfl:   •  fexofenadine (ALLEGRA) 180 MG tablet, Take 180 mg by mouth Daily., Disp: , Rfl:   •  fluticasone (FLONASE) 50 MCG/ACT nasal spray, 2 sprays into the nostril(s) as directed by provider Daily As Needed for Rhinitis., Disp: , Rfl:   •  furosemide (Lasix) 20 MG tablet, Take 1 tablet by mouth Daily., Disp: 90 tablet, Rfl: 3  •  hydrALAZINE (APRESOLINE) 25 MG tablet, Take 1 tablet by mouth 3 (Three) Times a Day., Disp: 90 tablet, Rfl: 5  •  levothyroxine (Synthroid) 75 MCG tablet, Take 1 tablet by mouth Daily., Disp: 90 tablet, Rfl: 3  •  magnesium chloride ER 64 MG DR tablet, Take 143 mg by mouth Daily., Disp: , Rfl:   •  Melatonin 10 MG capsule, Take 2 capsules by mouth Every Night., Disp: , Rfl:   •  mesalamine (APRISO) 0.375 g 24 hr capsule, Take 4 capsules by mouth Daily., Disp: 360 capsule, Rfl: 3  •  metoprolol succinate XL (TOPROL-XL) 25 MG 24 hr tablet, Take 1 tablet by mouth Daily., Disp: 90 tablet, Rfl: 3  •  Multiple Vitamins-Minerals (PRESERVISION/LUTEIN) capsule, Take 1 capsule by mouth 2 (two) times a day., Disp: , Rfl:   •  omeprazole (priLOSEC) 20 MG capsule, Take 1 capsule by mouth Daily., Disp: 90 capsule, Rfl: 3  •  potassium chloride 10 MEQ CR tablet, Take 1 tablet by mouth 2 (Two) Times a Day., Disp: 180 tablet, Rfl: 3  •  sodium bicarbonate 650 MG tablet, Take 1 tablet by mouth 2 (Two) Times a Day., Disp: 180 tablet, Rfl: 3  •  valsartan (DIOVAN) 160 MG tablet, Take 160 mg by mouth Daily., Disp: , Rfl:   •  vitamin D (ERGOCALCIFEROL) 1.25 MG (10442 UT) capsule capsule, Take 1 capsule by mouth 1 (One) Time Per Week., Disp: 15 capsule, Rfl: 3  •   ALPRAZolam (Xanax) 0.25 MG tablet, Take 1 tablet by mouth At Night As Needed for Anxiety., Disp: 30 tablet, Rfl: 3      Vitals:    10/25/21 1003   BP: 130/52   Pulse: 71   Temp: 97.9 °F (36.6 °C)   SpO2: 97%         10/25/21  1003   Weight: 71.7 kg (158 lb)       Body mass index is 21.43 kg/m².    Physical Exam  Vitals and nursing note reviewed.   Constitutional:       General: He is not in acute distress.     Appearance: Normal appearance. He is well-developed.   HENT:      Head: Normocephalic and atraumatic.      Right Ear: External ear normal.      Left Ear: External ear normal.      Nose: Nose normal.   Eyes:      Extraocular Movements: Extraocular movements intact.      Conjunctiva/sclera: Conjunctivae normal.      Pupils: Pupils are equal, round, and reactive to light.   Cardiovascular:      Rate and Rhythm: Normal rate and regular rhythm.      Pulses: Normal pulses.      Heart sounds: Normal heart sounds.   Pulmonary:      Effort: Pulmonary effort is normal.      Breath sounds: Normal breath sounds.   Abdominal:      General: Bowel sounds are normal.      Palpations: Abdomen is soft.   Musculoskeletal:         General: Normal range of motion.      Cervical back: Normal range of motion and neck supple. No rigidity. No muscular tenderness.   Skin:     General: Skin is warm and dry.   Neurological:      General: No focal deficit present.      Mental Status: He is alert and oriented to person, place, and time.   Psychiatric:         Mood and Affect: Mood normal.         Behavior: Behavior normal.               Assessment/Plan   Diagnoses and all orders for this visit:    1. Anxiety (Primary)  -     ALPRAZolam (Xanax) 0.25 MG tablet; Take 1 tablet by mouth At Night As Needed for Anxiety.  Dispense: 30 tablet; Refill: 3    2. CKD (chronic kidney disease) stage 4, GFR 15-29 ml/min ([facility])    3. Hypertension, benign    Other orders  -     hydrALAZINE (APRESOLINE) 25 MG tablet; Take 1 tablet by mouth 3 (Three) Times a  Day.  Dispense: 90 tablet; Refill: 5        Patient is not sleeping well at night he borrowed a neighbor's Xanax states he slept well.  He is requesting Xanax to help him sleep occasionally.  In addition to that he has chronic kidney disease followed by renal I do not have his most recent blood work available to review.  I am not rechecking blood work as it is already been done.  In regard to his blood pressure I am increasing his hydralazine to 25 mg 3 times daily we will see him back in 3 months.

## 2021-10-26 ENCOUNTER — TELEPHONE (OUTPATIENT)
Dept: ONCOLOGY | Facility: CLINIC | Age: 71
End: 2021-10-26

## 2021-10-26 NOTE — TELEPHONE ENCOUNTER
Caller: CHARLY COY    Relationship to patient: PATIENT    Best call back number: [phone]    Chief complaint: IS NAUSEATED IN THE MORNINGS AND WOULD LIKE A LATER APPOINTMENT    Type of visit: LAB AND FOLLOW UP    Requested date: 10/29/21 AT 11:00 OR 12:00    Additional notes: PLEASE CALL TO RESCHEDULE.

## 2021-10-27 ENCOUNTER — TELEPHONE (OUTPATIENT)
Dept: INTERNAL MEDICINE | Facility: CLINIC | Age: 71
End: 2021-10-27

## 2021-10-27 ENCOUNTER — OFFICE VISIT (OUTPATIENT)
Dept: INTERNAL MEDICINE | Facility: CLINIC | Age: 71
End: 2021-10-27

## 2021-10-27 VITALS
WEIGHT: 158 LBS | OXYGEN SATURATION: 100 % | TEMPERATURE: 97.9 F | HEIGHT: 72 IN | DIASTOLIC BLOOD PRESSURE: 50 MMHG | SYSTOLIC BLOOD PRESSURE: 120 MMHG | HEART RATE: 78 BPM | BODY MASS INDEX: 21.4 KG/M2

## 2021-10-27 DIAGNOSIS — D63.1 ANEMIA DUE TO STAGE 4 CHRONIC KIDNEY DISEASE (HCC): ICD-10-CM

## 2021-10-27 DIAGNOSIS — I10 HYPERTENSION, BENIGN: Primary | ICD-10-CM

## 2021-10-27 DIAGNOSIS — N18.4 ANEMIA DUE TO STAGE 4 CHRONIC KIDNEY DISEASE (HCC): ICD-10-CM

## 2021-10-27 DIAGNOSIS — N18.4 CRD (CHRONIC RENAL DISEASE), STAGE IV (HCC): Primary | ICD-10-CM

## 2021-10-27 DIAGNOSIS — K52.9 CHRONIC DIARRHEA: ICD-10-CM

## 2021-10-27 DIAGNOSIS — R11.2 NON-INTRACTABLE VOMITING WITH NAUSEA, UNSPECIFIED VOMITING TYPE: ICD-10-CM

## 2021-10-27 PROCEDURE — 99214 OFFICE O/P EST MOD 30 MIN: CPT | Performed by: INTERNAL MEDICINE

## 2021-10-27 RX ORDER — ONDANSETRON 8 MG/1
8 TABLET, ORALLY DISINTEGRATING ORAL EVERY 8 HOURS PRN
Qty: 12 TABLET | Refills: 1 | Status: ON HOLD | OUTPATIENT
Start: 2021-10-27 | End: 2021-11-01

## 2021-10-27 NOTE — TELEPHONE ENCOUNTER
Pt wife called stating pt has been having a upset stomach anytime he eats, to the point of throwing up but has no fever.  He takes a lot of medication so they did not know what to get OTC for this not to interact with his medication

## 2021-10-27 NOTE — TELEPHONE ENCOUNTER
Caller: Ricardo Hugo    Relationship: Self    Best call back number: [phone]        Who are you requesting to speak with (clinical staff, provider,  specific staff member): PLEASE RELAY TO DR GRIMM    Do you know the name of the person who called: PATIENT    What was the call regarding: DR RGIMM WANTS TO VIEW PATIENT'S LABS IN THE MORNING:  PATIENT WENT TO [facility] IN THE OFFICE--PATIENT COULD NOT DONATE, THE NURSE DIDN'T HAVE THE SUPPLIES NECESSARY, THE PATIENT THEN WENT TO [facility] AND THE LAB WAS ALREADY CLOSED.    Do you require a callback: NO

## 2021-10-27 NOTE — PROGRESS NOTES
"Subjective   Ricardo Hugo is a 71 y.o. male.   Chief Complaint   Patient presents with   • stomach issues     pt states for the last 7-10 days stome has been bothering him.  he is unable to keep any thing down and  \" his stomach feels like it just keeps bowling\"       History of Present Illness patient is here today stating that he is very nauseated he is been unable to keep anything down.  We have started him on some Questran because of the severe diarrhea that has kept his diarrhea controllable but unfortunately he now has nausea which I am concerned may be related to the Questran he is using.    The following portions of the patient's history were reviewed and updated as appropriate: allergies, current medications, past family history, past medical history, past social history, past surgical history and problem list.    Review of Systems   Constitutional: Negative for activity change, appetite change, fatigue, fever, unexpected weight gain and unexpected weight loss.   HENT: Negative for swollen glands, trouble swallowing and voice change.    Eyes: Negative for blurred vision and visual disturbance.   Respiratory: Negative for cough and shortness of breath.    Cardiovascular: Negative for chest pain, palpitations and leg swelling.   Gastrointestinal: Positive for abdominal distention, diarrhea, nausea and vomiting. Negative for abdominal pain, constipation and indigestion.   Endocrine: Negative for cold intolerance, heat intolerance, polydipsia and polyphagia.   Genitourinary: Negative for dysuria and frequency.   Musculoskeletal: Negative for arthralgias, back pain, joint swelling and neck pain.   Skin: Negative for color change, rash and skin lesions.   Neurological: Negative for dizziness, weakness, headache, memory problem and confusion.   Hematological: Does not bruise/bleed easily.   Psychiatric/Behavioral: Negative for agitation, hallucinations and suicidal ideas. The patient is not nervous/anxious.  "       Objective   Past Medical History:   Diagnosis Date   • 3-vessel CAD 8/11/2020   • Allergic rhinitis    • Anxiety disorder 4/27/2020   • Arthritis    • Asymmetrical sensorineural hearing loss 6/28/2017   • Atherosclerosis of native artery of both lower extremities with intermittent claudication ([facility]) 7/18/2019   • Avascular necrosis of femoral head, left ([facility]) 07/11/2020    right hip after surgery   • Carotid stenosis    • Chronic mucoid otitis media    • Chronic rhinitis    • Coronary artery disease     HEART BYPASS 2004   • Crohn's disease of large intestine with other complication ([facility]) 7/30/2020    Chronic diarrhea Colonoscopy July 2020 revealed mild patchy scattered hemosiderin staining with inflammation more so in rectosigmoid area.  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   • Iron deficiency anemia 7/14/2020   • Mixed hearing loss of left ear    • PAD (peripheral artery disease) ([facility]) 8/11/2020   • Perianal abscess    • Pernicious anemia 08/17/2020    took shots but never diagnosed with b12 deficiency   • Personal history of alcoholism ([facility]) 08/11/2020    quit drinking in 2013   • Prostatic hypertrophy 8/11/2020   • Sensorineural hearing loss    • Sepsis with acute renal failure ([facility]) 9/15/2020   • Shortness of breath 5/27/2021   • Tinnitus    • Ventricular tachycardia, nonsustained ([facility]) 7/14/2020   • Weight loss 7/11/2020      Past Surgical History:   Procedure Laterality Date   • ARTERY SURGERY  2021    right side on neck   • CAROTID ENDARTERECTOMY Right 5/10/2021    Procedure: RIGHT CAROTID ENDARTERECTOMY WITH EEG;   Surgeon: Gil Pineda DO;  Location: [facility] HYBRID OR 12;  Service: Vascular;  Laterality: Right;   • COLONOSCOPY N/A 7/2/2020    Procedure: COLONOSCOPY WITH ANESTHESIA;  Surgeon: Adrien Brewster MD;  Location: [facility] ENDOSCOPY;  Service: Gastroenterology;  Laterality: N/A;  pre op: diarrhea  post op: polyps  PCP: Joe Velasco MD   • COLONOSCOPY N/A 10/13/2020    Procedure: COLONOSCOPY WITH ANESTHESIA;  Surgeon: Adrien Brewster MD;  Location: [facility] ENDOSCOPY;  Service: Gastroenterology;  Laterality: N/A;  Pre: Chronic Diarrhea, Crohn's  Post: AVM  Dr. Neftali Velasco  CO2 Inflation Used   • CORONARY ARTERY BYPASS GRAFT  2003    x3   • EYE SURGERY Bilateral     catorac   • INCISION AND DRAINAGE PERIRECTAL ABSCESS N/A 3/3/2017    Procedure: INCISION AND DRAINAGE OF JEET ANAL ABSCESS;  Surgeon: Lynette Smith MD;  Location: [facility] OR;  Service:    • MYRINGOTOMY W/ TUBES Left 04/17/2017    06/10/2016   • TONSILLECTOMY     • TOTAL HIP ARTHROPLASTY Right 2006        Current Outpatient Medications:   •  albuterol sulfate  (90 Base) MCG/ACT inhaler, USE 2 INHALATIONS FOUR TIMES A DAY AS NEEDED, Disp: 20.1 g, Rfl: 3  •  ALPRAZolam (Xanax) 0.25 MG tablet, Take 1 tablet by mouth At Night As Needed for Anxiety., Disp: 30 tablet, Rfl: 3  •  amLODIPine (NORVASC) 10 MG tablet, TAKE 1 TABLET DAILY, Disp: 90 tablet, Rfl: 3  •  aspirin (ASPIR) 81 MG EC tablet, Take 81 mg by mouth Daily., Disp: , Rfl:   •  Aspirin-Acetaminophen-Caffeine (EXCEDRIN EXTRA STRENGTH PO), Take 2 tablets by mouth Daily As Needed (HEADACHES)., Disp: , Rfl:   •  atorvastatin (LIPITOR) 10 MG tablet, Take 1 tablet by mouth Daily., Disp: 90 tablet, Rfl: 3  •  azelastine (ASTELIN) 0.1 % nasal spray, 1 spray into the nostril(s) as directed by provider 2 (Two) Times a Day. Use in each nostril as directed, Disp: 90 mL, Rfl: 3  •  cholestyramine (QUESTRAN) 4 g packet, TAKE 1 PACKET BY MOUTH DAILY, Disp: 30 each, Rfl: 5  •  cloNIDine (CATAPRES)  0.2 MG tablet, TAKE 3 TABLETS DAILY, Disp: 270 tablet, Rfl: 3  •  clopidogrel (Plavix) 75 MG tablet, Take 1 tablet by mouth Daily., Disp: 90 tablet, Rfl: 3  •  Copper Gluconate 2 MG tablet, Take 2 mg by mouth Daily., Disp: 30 tablet, Rfl: 6  •  diphenhydrAMINE (Benadryl Allergy) 25 mg capsule, Take 25 mg by mouth Every 6 (Six) Hours As Needed for Itching., Disp: , Rfl:   •  DIPHENOXYLATE-ATROPINE PO, Take  by mouth., Disp: , Rfl:   •  fexofenadine (ALLEGRA) 180 MG tablet, Take 180 mg by mouth Daily., Disp: , Rfl:   •  fluticasone (FLONASE) 50 MCG/ACT nasal spray, 2 sprays into the nostril(s) as directed by provider Daily As Needed for Rhinitis., Disp: , Rfl:   •  furosemide (Lasix) 20 MG tablet, Take 1 tablet by mouth Daily., Disp: 90 tablet, Rfl: 3  •  hydrALAZINE (APRESOLINE) 25 MG tablet, Take 1 tablet by mouth 3 (Three) Times a Day., Disp: 90 tablet, Rfl: 5  •  levothyroxine (Synthroid) 75 MCG tablet, Take 1 tablet by mouth Daily., Disp: 90 tablet, Rfl: 3  •  magnesium chloride ER 64 MG DR tablet, Take 143 mg by mouth Daily., Disp: , Rfl:   •  Melatonin 10 MG capsule, Take 2 capsules by mouth Every Night., Disp: , Rfl:   •  mesalamine (APRISO) 0.375 g 24 hr capsule, Take 4 capsules by mouth Daily., Disp: 360 capsule, Rfl: 3  •  metoprolol succinate XL (TOPROL-XL) 25 MG 24 hr tablet, Take 1 tablet by mouth Daily., Disp: 90 tablet, Rfl: 3  •  Multiple Vitamins-Minerals (PRESERVISION/LUTEIN) capsule, Take 1 capsule by mouth 2 (two) times a day., Disp: , Rfl:   •  omeprazole (priLOSEC) 20 MG capsule, Take 1 capsule by mouth Daily., Disp: 90 capsule, Rfl: 3  •  potassium chloride 10 MEQ CR tablet, Take 1 tablet by mouth 2 (Two) Times a Day., Disp: 180 tablet, Rfl: 3  •  sodium bicarbonate 650 MG tablet, Take 1 tablet by mouth 2 (Two) Times a Day., Disp: 180 tablet, Rfl: 3  •  valsartan (DIOVAN) 160 MG tablet, Take 160 mg by mouth Daily., Disp: , Rfl:   •  vitamin D (ERGOCALCIFEROL) 1.25 MG (95713 UT) capsule capsule,  Take 1 capsule by mouth 1 (One) Time Per Week., Disp: 15 capsule, Rfl: 3  •  ondansetron ODT (Zofran ODT) 8 MG disintegrating tablet, Place 1 tablet on the tongue Every 8 (Eight) Hours As Needed for Nausea or Vomiting., Disp: 12 tablet, Rfl: 1      Vitals:    10/27/21 1521   BP: 120/50   Pulse: 78   Temp: 97.9 °F (36.6 °C)   SpO2: 100%         10/27/21  1521   Weight: 71.7 kg (158 lb)       Body mass index is 21.43 kg/m².    Physical Exam  Vitals and nursing note reviewed.   Constitutional:       General: He is not in acute distress.     Appearance: Normal appearance. He is well-developed.   HENT:      Head: Normocephalic and atraumatic.      Right Ear: External ear normal.      Left Ear: External ear normal.      Nose: Nose normal.   Eyes:      Extraocular Movements: Extraocular movements intact.      Conjunctiva/sclera: Conjunctivae normal.      Pupils: Pupils are equal, round, and reactive to light.   Cardiovascular:      Rate and Rhythm: Normal rate and regular rhythm.      Pulses: Normal pulses.      Heart sounds: Normal heart sounds.   Pulmonary:      Effort: Pulmonary effort is normal.      Breath sounds: Normal breath sounds.   Abdominal:      General: Bowel sounds are normal.      Palpations: Abdomen is soft.   Musculoskeletal:         General: Normal range of motion.      Cervical back: Normal range of motion and neck supple. No rigidity. No muscular tenderness.   Skin:     General: Skin is warm and dry.   Neurological:      General: No focal deficit present.      Mental Status: He is alert and oriented to person, place, and time.   Psychiatric:         Mood and Affect: Mood normal.         Behavior: Behavior normal.               Assessment/Plan   Diagnoses and all orders for this visit:    1. Hypertension, benign (Primary)  -     Basic Metabolic Panel  -     CBC & Differential    2. Chronic diarrhea  -     Basic Metabolic Panel  -     CBC & Differential    3. Non-intractable vomiting with nausea,  unspecified vomiting type    Other orders  -     ondansetron ODT (Zofran ODT) 8 MG disintegrating tablet; Place 1 tablet on the tongue Every 8 (Eight) Hours As Needed for Nausea or Vomiting.  Dispense: 12 tablet; Refill: 1      At this time I am giving patient Zofran to quiet down his nausea I have asked him to hold his Questran until I can call him tomorrow about his blood work.  He is having a CBC and a bMP done today.

## 2021-10-28 ENCOUNTER — HOSPITAL ENCOUNTER (INPATIENT)
Facility: HOSPITAL | Age: 71
LOS: 6 days | Discharge: HOME | End: 2021-11-03
Attending: FAMILY MEDICINE | Admitting: INTERNAL MEDICINE

## 2021-10-28 ENCOUNTER — APPOINTMENT (OUTPATIENT)
Dept: GENERAL RADIOLOGY | Facility: HOSPITAL | Age: 71
End: 2021-10-28

## 2021-10-28 ENCOUNTER — APPOINTMENT (OUTPATIENT)
Dept: CT IMAGING | Facility: HOSPITAL | Age: 71
End: 2021-10-28

## 2021-10-28 DIAGNOSIS — K92.2 GASTROINTESTINAL HEMORRHAGE, UNSPECIFIED GASTROINTESTINAL HEMORRHAGE TYPE: ICD-10-CM

## 2021-10-28 DIAGNOSIS — E86.0 DEHYDRATION: ICD-10-CM

## 2021-10-28 DIAGNOSIS — D64.9 SYMPTOMATIC ANEMIA: ICD-10-CM

## 2021-10-28 DIAGNOSIS — K29.61 GASTROINTESTINAL HEMORRHAGE ASSOCIATED WITH OTHER GASTRITIS: ICD-10-CM

## 2021-10-28 DIAGNOSIS — N17.9 AKI (ACUTE KIDNEY INJURY) (HCC): Primary | ICD-10-CM

## 2021-10-28 PROBLEM — N18.4 ACUTE RENAL FAILURE SUPERIMPOSED ON STAGE 4 CHRONIC KIDNEY DISEASE: Status: ACTIVE | Noted: 2020-07-11

## 2021-10-28 LAB
ABO GROUP BLD: NORMAL
ACANTHOCYTES BLD QL SMEAR: ABNORMAL
ADV 40+41 DNA STL QL NAA+NON-PROBE: NOT DETECTED
ALBUMIN SERPL-MCNC: 3.7 G/DL (ref 3.5–5.2)
ALBUMIN/GLOB SERPL: 1.4 G/DL
ALP SERPL-CCNC: 169 U/L (ref 39–117)
ALT SERPL W P-5'-P-CCNC: 5 U/L (ref 1–41)
ANION GAP SERPL CALCULATED.3IONS-SCNC: 19 MMOL/L (ref 5–15)
ANISOCYTOSIS BLD QL: ABNORMAL
ARTERIAL PATENCY WRIST A: POSITIVE
AST SERPL-CCNC: 9 U/L (ref 1–40)
ASTRO TYP 1-8 RNA STL QL NAA+NON-PROBE: NOT DETECTED
ATMOSPHERIC PRESS: 735 MMHG
B PARAPERT DNA SPEC QL NAA+PROBE: NOT DETECTED
B PERT DNA SPEC QL NAA+PROBE: NOT DETECTED
BASE EXCESS BLDA CALC-SCNC: -20.3 MMOL/L (ref 0–2)
BDY SITE: ABNORMAL
BILIRUB SERPL-MCNC: 0.2 MG/DL (ref 0–1.2)
BLD GP AB SCN SERPL QL: NEGATIVE
BODY TEMPERATURE: 37 C
BUN SERPL-MCNC: 71 MG/DL (ref 8–23)
BUN/CREAT SERPL: 17.1 (ref 7–25)
BURR CELLS BLD QL SMEAR: ABNORMAL
C CAYETANENSIS DNA STL QL NAA+NON-PROBE: NOT DETECTED
C COLI+JEJ+UPSA DNA STL QL NAA+NON-PROBE: NOT DETECTED
C PNEUM DNA NPH QL NAA+NON-PROBE: NOT DETECTED
CALCIUM SPEC-SCNC: 8.5 MG/DL (ref 8.6–10.5)
CHLORIDE SERPL-SCNC: 108 MMOL/L (ref 98–107)
CO2 SERPL-SCNC: 9 MMOL/L (ref 22–29)
CREAT SERPL-MCNC: 4.15 MG/DL (ref 0.76–1.27)
CRYPTOSP DNA STL QL NAA+NON-PROBE: NOT DETECTED
D-LACTATE SERPL-SCNC: 1.6 MMOL/L (ref 0.5–2)
DACRYOCYTES BLD QL SMEAR: ABNORMAL
DEPRECATED RDW RBC AUTO: 58.2 FL (ref 37–54)
E HISTOLYT DNA STL QL NAA+NON-PROBE: NOT DETECTED
EAEC PAA PLAS AGGR+AATA ST NAA+NON-PRB: NOT DETECTED
EC STX1+STX2 GENES STL QL NAA+NON-PROBE: NOT DETECTED
EPEC EAE GENE STL QL NAA+NON-PROBE: NOT DETECTED
ERYTHROCYTE [DISTWIDTH] IN BLOOD BY AUTOMATED COUNT: 16.4 % (ref 12.3–15.4)
ETEC LTA+ST1A+ST1B TOX ST NAA+NON-PROBE: NOT DETECTED
FERRITIN SERPL-MCNC: 48.07 NG/ML (ref 30–400)
FLUAV SUBTYP SPEC NAA+PROBE: NOT DETECTED
FLUBV RNA ISLT QL NAA+PROBE: NOT DETECTED
G LAMBLIA DNA STL QL NAA+NON-PROBE: NOT DETECTED
GFR SERPL CREATININE-BSD FRML MDRD: 14 ML/MIN/1.73
GFR SERPL CREATININE-BSD FRML MDRD: ABNORMAL ML/MIN/{1.73_M2}
GLOBULIN UR ELPH-MCNC: 2.6 GM/DL
GLUCOSE BLDC GLUCOMTR-MCNC: 109 MG/DL (ref 70–130)
GLUCOSE SERPL-MCNC: 105 MG/DL (ref 65–99)
HADV DNA SPEC NAA+PROBE: NOT DETECTED
HCO3 BLDA-SCNC: 6.9 MMOL/L (ref 20–26)
HCOV 229E RNA SPEC QL NAA+PROBE: NOT DETECTED
HCOV HKU1 RNA SPEC QL NAA+PROBE: NOT DETECTED
HCOV NL63 RNA SPEC QL NAA+PROBE: NOT DETECTED
HCOV OC43 RNA SPEC QL NAA+PROBE: NOT DETECTED
HCT VFR BLD AUTO: 25.5 % (ref 37.5–51)
HELMET CELLS: ABNORMAL
HGB BLD-MCNC: 8 G/DL (ref 13–17.7)
HMPV RNA NPH QL NAA+NON-PROBE: NOT DETECTED
HOLD SPECIMEN: NORMAL
HOLD SPECIMEN: NORMAL
HPIV1 RNA SPEC QL NAA+PROBE: NOT DETECTED
HPIV2 RNA SPEC QL NAA+PROBE: NOT DETECTED
HPIV3 RNA NPH QL NAA+PROBE: NOT DETECTED
HPIV4 P GENE NPH QL NAA+PROBE: NOT DETECTED
IRON 24H UR-MRATE: 60 MCG/DL (ref 59–158)
IRON SATN MFR SERPL: 18 % (ref 20–50)
LDH SERPL-CCNC: 166 U/L (ref 135–225)
LYMPHOCYTES # BLD MANUAL: 2.05 10*3/MM3 (ref 0.7–3.1)
LYMPHOCYTES NFR BLD MANUAL: 12.1 % (ref 19.6–45.3)
LYMPHOCYTES NFR BLD MANUAL: 6.1 % (ref 5–12)
Lab: ABNORMAL
Lab: ABNORMAL
M PNEUMO IGG SER IA-ACNC: NOT DETECTED
MAGNESIUM SERPL-MCNC: 1.8 MG/DL (ref 1.6–2.4)
MCH RBC QN AUTO: 30.8 PG (ref 26.6–33)
MCHC RBC AUTO-ENTMCNC: 31.4 G/DL (ref 31.5–35.7)
MCV RBC AUTO: 98.1 FL (ref 79–97)
METAMYELOCYTES NFR BLD MANUAL: 1 % (ref 0–0)
MICROCYTES BLD QL: ABNORMAL
MODALITY: ABNORMAL
MONOCYTES # BLD AUTO: 1.04 10*3/MM3 (ref 0.1–0.9)
NEUTROPHILS # BLD AUTO: 13.71 10*3/MM3 (ref 1.7–7)
NEUTROPHILS NFR BLD MANUAL: 56.6 % (ref 42.7–76)
NEUTS BAND NFR BLD MANUAL: 24.2 % (ref 0–5)
NOROVIRUS GI+II RNA STL QL NAA+NON-PROBE: NOT DETECTED
NOTIFIED BY: ABNORMAL
NOTIFIED WHO: ABNORMAL
P SHIGELLOIDES DNA STL QL NAA+NON-PROBE: NOT DETECTED
PCO2 BLDA: 20.1 MM HG (ref 35–45)
PCO2 TEMP ADJ BLD: 20.1 MM HG (ref 35–45)
PH BLDA: 7.14 PH UNITS (ref 7.35–7.45)
PH, TEMP CORRECTED: 7.14 PH UNITS (ref 7.35–7.45)
PLAT MORPH BLD: NORMAL
PLATELET # BLD AUTO: 348 10*3/MM3 (ref 140–450)
PMV BLD AUTO: 9.4 FL (ref 6–12)
PO2 BLDA: 117 MM HG (ref 83–108)
PO2 TEMP ADJ BLD: 117 MM HG (ref 83–108)
POIKILOCYTOSIS BLD QL SMEAR: ABNORMAL
POLYCHROMASIA BLD QL SMEAR: ABNORMAL
POTASSIUM SERPL-SCNC: 4.6 MMOL/L (ref 3.5–5.2)
PROT SERPL-MCNC: 6.3 G/DL (ref 6–8.5)
RBC # BLD AUTO: 2.6 10*6/MM3 (ref 4.14–5.8)
RETICS # AUTO: 0.18 10*6/MM3 (ref 0.02–0.13)
RETICS/RBC NFR AUTO: 6.88 % (ref 0.7–1.9)
RH BLD: NEGATIVE
RHINOVIRUS RNA SPEC NAA+PROBE: NOT DETECTED
RSV RNA NPH QL NAA+NON-PROBE: NOT DETECTED
RVA RNA STL QL NAA+NON-PROBE: NOT DETECTED
S ENT+BONG DNA STL QL NAA+NON-PROBE: NOT DETECTED
S PYO AG THROAT QL: NEGATIVE
SAO2 % BLDCOA: 97.2 % (ref 94–99)
SAPO I+II+IV+V RNA STL QL NAA+NON-PROBE: NOT DETECTED
SCHISTOCYTES BLD QL SMEAR: ABNORMAL
SHIGELLA SP+EIEC IPAH ST NAA+NON-PROBE: NOT DETECTED
SODIUM SERPL-SCNC: 136 MMOL/L (ref 136–145)
SPHEROCYTES BLD QL SMEAR: ABNORMAL
T&S EXPIRATION DATE: NORMAL
TIBC SERPL-MCNC: 343 MCG/DL (ref 298–536)
TRANSFERRIN SERPL-MCNC: 230 MG/DL (ref 200–360)
TROPONIN T SERPL-MCNC: 0.04 NG/ML (ref 0–0.03)
V CHOL+PARA+VUL DNA STL QL NAA+NON-PROBE: NOT DETECTED
V CHOLERAE DNA STL QL NAA+NON-PROBE: NOT DETECTED
VENTILATOR MODE: ABNORMAL
WBC # BLD AUTO: 16.97 10*3/MM3 (ref 3.4–10.8)
WBC MORPH BLD: NORMAL
WHOLE BLOOD HOLD SPECIMEN: NORMAL
WHOLE BLOOD HOLD SPECIMEN: NORMAL
Y ENTEROCOL DNA STL QL NAA+NON-PROBE: NOT DETECTED

## 2021-10-28 PROCEDURE — 83605 ASSAY OF LACTIC ACID: CPT | Performed by: FAMILY MEDICINE

## 2021-10-28 PROCEDURE — 36415 COLL VENOUS BLD VENIPUNCTURE: CPT

## 2021-10-28 PROCEDURE — 85007 BL SMEAR W/DIFF WBC COUNT: CPT | Performed by: FAMILY MEDICINE

## 2021-10-28 PROCEDURE — 84466 ASSAY OF TRANSFERRIN: CPT | Performed by: NURSE PRACTITIONER

## 2021-10-28 PROCEDURE — 25010000002 ONDANSETRON PER 1 MG: Performed by: FAMILY MEDICINE

## 2021-10-28 PROCEDURE — 83735 ASSAY OF MAGNESIUM: CPT

## 2021-10-28 PROCEDURE — 80053 COMPREHEN METABOLIC PANEL: CPT

## 2021-10-28 PROCEDURE — 25010000002 FENTANYL CITRATE (PF) 50 MCG/ML SOLUTION: Performed by: NURSE PRACTITIONER

## 2021-10-28 PROCEDURE — 85025 COMPLETE CBC W/AUTO DIFF WBC: CPT | Performed by: FAMILY MEDICINE

## 2021-10-28 PROCEDURE — 25010000002 IRON SUCROSE PER 1 MG: Performed by: NURSE PRACTITIONER

## 2021-10-28 PROCEDURE — 99285 EMERGENCY DEPT VISIT HI MDM: CPT

## 2021-10-28 PROCEDURE — 93005 ELECTROCARDIOGRAM TRACING: CPT | Performed by: FAMILY MEDICINE

## 2021-10-28 PROCEDURE — 83540 ASSAY OF IRON: CPT | Performed by: NURSE PRACTITIONER

## 2021-10-28 PROCEDURE — 0097U HC BIOFIRE FILMARRAY GI PANEL: CPT | Performed by: FAMILY MEDICINE

## 2021-10-28 PROCEDURE — 25010000002 FENTANYL CITRATE (PF) 50 MCG/ML SOLUTION: Performed by: FAMILY MEDICINE

## 2021-10-28 PROCEDURE — 87880 STREP A ASSAY W/OPTIC: CPT | Performed by: FAMILY MEDICINE

## 2021-10-28 PROCEDURE — 82607 VITAMIN B-12: CPT | Performed by: NURSE PRACTITIONER

## 2021-10-28 PROCEDURE — 93010 ELECTROCARDIOGRAM REPORT: CPT | Performed by: INTERNAL MEDICINE

## 2021-10-28 PROCEDURE — 25010000002 LEVOFLOXACIN PER 250 MG: Performed by: NURSE PRACTITIONER

## 2021-10-28 PROCEDURE — P9016 RBC LEUKOCYTES REDUCED: HCPCS

## 2021-10-28 PROCEDURE — 86900 BLOOD TYPING SEROLOGIC ABO: CPT

## 2021-10-28 PROCEDURE — 25010000002 HYDROCORTISONE SODIUM SUCCINATE 100 MG RECONSTITUTED SOLUTION: Performed by: NURSE PRACTITIONER

## 2021-10-28 PROCEDURE — 25010000002 DIPHENHYDRAMINE PER 50 MG: Performed by: NURSE PRACTITIONER

## 2021-10-28 PROCEDURE — 82803 BLOOD GASES ANY COMBINATION: CPT

## 2021-10-28 PROCEDURE — 36430 TRANSFUSION BLD/BLD COMPNT: CPT

## 2021-10-28 PROCEDURE — 94799 UNLISTED PULMONARY SVC/PX: CPT

## 2021-10-28 PROCEDURE — 86923 COMPATIBILITY TEST ELECTRIC: CPT

## 2021-10-28 PROCEDURE — 86850 RBC ANTIBODY SCREEN: CPT | Performed by: FAMILY MEDICINE

## 2021-10-28 PROCEDURE — 71045 X-RAY EXAM CHEST 1 VIEW: CPT

## 2021-10-28 PROCEDURE — 82728 ASSAY OF FERRITIN: CPT | Performed by: NURSE PRACTITIONER

## 2021-10-28 PROCEDURE — 36600 WITHDRAWAL OF ARTERIAL BLOOD: CPT

## 2021-10-28 PROCEDURE — 84484 ASSAY OF TROPONIN QUANT: CPT

## 2021-10-28 PROCEDURE — 86901 BLOOD TYPING SEROLOGIC RH(D): CPT | Performed by: FAMILY MEDICINE

## 2021-10-28 PROCEDURE — 93005 ELECTROCARDIOGRAM TRACING: CPT

## 2021-10-28 PROCEDURE — 83615 LACTATE (LD) (LDH) ENZYME: CPT | Performed by: NURSE PRACTITIONER

## 2021-10-28 PROCEDURE — 70490 CT SOFT TISSUE NECK W/O DYE: CPT

## 2021-10-28 PROCEDURE — 85045 AUTOMATED RETICULOCYTE COUNT: CPT | Performed by: NURSE PRACTITIONER

## 2021-10-28 PROCEDURE — 87081 CULTURE SCREEN ONLY: CPT | Performed by: FAMILY MEDICINE

## 2021-10-28 PROCEDURE — 86900 BLOOD TYPING SEROLOGIC ABO: CPT | Performed by: FAMILY MEDICINE

## 2021-10-28 PROCEDURE — 87633 RESP VIRUS 12-25 TARGETS: CPT | Performed by: FAMILY MEDICINE

## 2021-10-28 PROCEDURE — 82962 GLUCOSE BLOOD TEST: CPT

## 2021-10-28 RX ORDER — SODIUM CHLORIDE 0.9 % (FLUSH) 0.9 %
10 SYRINGE (ML) INJECTION EVERY 12 HOURS SCHEDULED
Status: DISCONTINUED | OUTPATIENT
Start: 2021-10-28 | End: 2021-11-03 | Stop reason: HOSPADM

## 2021-10-28 RX ORDER — LEVOFLOXACIN 5 MG/ML
500 INJECTION, SOLUTION INTRAVENOUS ONCE
Status: DISCONTINUED | OUTPATIENT
Start: 2021-10-28 | End: 2021-10-28

## 2021-10-28 RX ORDER — ACETAMINOPHEN 650 MG/1
650 SUPPOSITORY RECTAL EVERY 4 HOURS PRN
Status: DISCONTINUED | OUTPATIENT
Start: 2021-10-28 | End: 2021-11-03 | Stop reason: HOSPADM

## 2021-10-28 RX ORDER — IPRATROPIUM BROMIDE AND ALBUTEROL SULFATE 2.5; .5 MG/3ML; MG/3ML
3 SOLUTION RESPIRATORY (INHALATION) EVERY 6 HOURS PRN
Status: DISCONTINUED | OUTPATIENT
Start: 2021-10-28 | End: 2021-11-03 | Stop reason: HOSPADM

## 2021-10-28 RX ORDER — LEVOTHYROXINE SODIUM 0.07 MG/1
75 TABLET ORAL
Status: DISCONTINUED | OUTPATIENT
Start: 2021-10-29 | End: 2021-11-03 | Stop reason: HOSPADM

## 2021-10-28 RX ORDER — HYDRALAZINE HYDROCHLORIDE 25 MG/1
25 TABLET, FILM COATED ORAL 3 TIMES DAILY
Status: CANCELLED | OUTPATIENT
Start: 2021-10-28

## 2021-10-28 RX ORDER — METOPROLOL SUCCINATE 25 MG/1
25 TABLET, EXTENDED RELEASE ORAL DAILY
Status: DISCONTINUED | OUTPATIENT
Start: 2021-10-28 | End: 2021-11-03 | Stop reason: HOSPADM

## 2021-10-28 RX ORDER — SODIUM CHLORIDE 9 MG/ML
100 INJECTION, SOLUTION INTRAVENOUS CONTINUOUS
Status: DISCONTINUED | OUTPATIENT
Start: 2021-10-28 | End: 2021-10-28

## 2021-10-28 RX ORDER — PANTOPRAZOLE SODIUM 40 MG/10ML
80 INJECTION, POWDER, LYOPHILIZED, FOR SOLUTION INTRAVENOUS ONCE
Status: COMPLETED | OUTPATIENT
Start: 2021-10-28 | End: 2021-10-28

## 2021-10-28 RX ORDER — SODIUM BICARBONATE 650 MG/1
650 TABLET ORAL 2 TIMES DAILY
Status: DISCONTINUED | OUTPATIENT
Start: 2021-10-28 | End: 2021-11-01

## 2021-10-28 RX ORDER — SODIUM CHLORIDE 0.9 % (FLUSH) 0.9 %
10 SYRINGE (ML) INJECTION AS NEEDED
Status: DISCONTINUED | OUTPATIENT
Start: 2021-10-28 | End: 2021-11-03 | Stop reason: HOSPADM

## 2021-10-28 RX ORDER — AZELASTINE 1 MG/ML
1 SPRAY, METERED NASAL 2 TIMES DAILY
Status: DISCONTINUED | OUTPATIENT
Start: 2021-10-28 | End: 2021-11-03 | Stop reason: HOSPADM

## 2021-10-28 RX ORDER — CLONIDINE HYDROCHLORIDE 0.2 MG/1
0.6 TABLET ORAL DAILY
Status: DISCONTINUED | OUTPATIENT
Start: 2021-10-29 | End: 2021-11-02

## 2021-10-28 RX ORDER — DIPHENHYDRAMINE HYDROCHLORIDE 50 MG/ML
25 INJECTION INTRAMUSCULAR; INTRAVENOUS ONCE
Status: COMPLETED | OUTPATIENT
Start: 2021-10-28 | End: 2021-10-28

## 2021-10-28 RX ORDER — FENTANYL CITRATE 50 UG/ML
25 INJECTION, SOLUTION INTRAMUSCULAR; INTRAVENOUS ONCE
Status: COMPLETED | OUTPATIENT
Start: 2021-10-28 | End: 2021-10-28

## 2021-10-28 RX ORDER — DIPHENHYDRAMINE HYDROCHLORIDE AND LIDOCAINE HYDROCHLORIDE AND ALUMINUM HYDROXIDE AND MAGNESIUM HYDRO
5 KIT EVERY 4 HOURS
Status: DISCONTINUED | OUTPATIENT
Start: 2021-10-28 | End: 2021-11-03 | Stop reason: HOSPADM

## 2021-10-28 RX ORDER — ALPRAZOLAM 0.25 MG/1
0.25 TABLET ORAL NIGHTLY PRN
Status: DISCONTINUED | OUTPATIENT
Start: 2021-10-28 | End: 2021-11-03 | Stop reason: HOSPADM

## 2021-10-28 RX ORDER — ONDANSETRON 4 MG/1
4 TABLET, FILM COATED ORAL EVERY 6 HOURS PRN
Status: DISCONTINUED | OUTPATIENT
Start: 2021-10-28 | End: 2021-11-03 | Stop reason: HOSPADM

## 2021-10-28 RX ORDER — MESALAMINE 0.38 G/1
1.5 CAPSULE, EXTENDED RELEASE ORAL DAILY
Status: DISCONTINUED | OUTPATIENT
Start: 2021-10-29 | End: 2021-11-03 | Stop reason: HOSPADM

## 2021-10-28 RX ORDER — ACETAMINOPHEN 160 MG/5ML
650 SOLUTION ORAL EVERY 4 HOURS PRN
Status: DISCONTINUED | OUTPATIENT
Start: 2021-10-28 | End: 2021-11-03 | Stop reason: HOSPADM

## 2021-10-28 RX ORDER — ONDANSETRON 2 MG/ML
4 INJECTION INTRAMUSCULAR; INTRAVENOUS ONCE
Status: COMPLETED | OUTPATIENT
Start: 2021-10-28 | End: 2021-10-28

## 2021-10-28 RX ORDER — ONDANSETRON 2 MG/ML
4 INJECTION INTRAMUSCULAR; INTRAVENOUS EVERY 6 HOURS PRN
Status: DISCONTINUED | OUTPATIENT
Start: 2021-10-28 | End: 2021-11-03 | Stop reason: HOSPADM

## 2021-10-28 RX ORDER — AMLODIPINE BESYLATE 10 MG/1
10 TABLET ORAL DAILY
Status: DISCONTINUED | OUTPATIENT
Start: 2021-10-28 | End: 2021-11-03 | Stop reason: HOSPADM

## 2021-10-28 RX ORDER — ACETAMINOPHEN 325 MG/1
650 TABLET ORAL EVERY 4 HOURS PRN
Status: DISCONTINUED | OUTPATIENT
Start: 2021-10-28 | End: 2021-11-03 | Stop reason: HOSPADM

## 2021-10-28 RX ORDER — LEVOFLOXACIN 5 MG/ML
250 INJECTION, SOLUTION INTRAVENOUS
Status: DISCONTINUED | OUTPATIENT
Start: 2021-10-30 | End: 2021-10-29

## 2021-10-28 RX ORDER — DIPHENHYDRAMINE HCL 25 MG
25 CAPSULE ORAL EVERY 6 HOURS PRN
Status: DISCONTINUED | OUTPATIENT
Start: 2021-10-28 | End: 2021-11-03 | Stop reason: HOSPADM

## 2021-10-28 RX ORDER — LEVOFLOXACIN 5 MG/ML
500 INJECTION, SOLUTION INTRAVENOUS ONCE
Status: COMPLETED | OUTPATIENT
Start: 2021-10-28 | End: 2021-10-28

## 2021-10-28 RX ADMIN — DIPHENHYDRAMINE HYDROCHLORIDE AND LIDOCAINE HYDROCHLORIDE AND ALUMINUM HYDROXIDE AND MAGNESIUM HYDRO 5 ML: KIT at 22:20

## 2021-10-28 RX ADMIN — SODIUM CHLORIDE 125 ML/HR: 9 INJECTION, SOLUTION INTRAVENOUS at 17:52

## 2021-10-28 RX ADMIN — HYDROCORTISONE SODIUM SUCCINATE 100 MG: 100 INJECTION, POWDER, FOR SOLUTION INTRAMUSCULAR; INTRAVENOUS at 20:59

## 2021-10-28 RX ADMIN — METRONIDAZOLE 500 MG: 500 INJECTION, SOLUTION INTRAVENOUS at 21:47

## 2021-10-28 RX ADMIN — FENTANYL CITRATE 25 MCG: 50 INJECTION, SOLUTION INTRAMUSCULAR; INTRAVENOUS at 20:23

## 2021-10-28 RX ADMIN — AZELASTINE HYDROCHLORIDE 1 SPRAY: 137 SPRAY, METERED NASAL at 22:21

## 2021-10-28 RX ADMIN — IRON SUCROSE 200 MG: 20 INJECTION, SOLUTION INTRAVENOUS at 21:25

## 2021-10-28 RX ADMIN — SODIUM CHLORIDE 500 ML: 9 INJECTION, SOLUTION INTRAVENOUS at 17:51

## 2021-10-28 RX ADMIN — PANTOPRAZOLE SODIUM 8 MG/HR: 40 INJECTION, POWDER, FOR SOLUTION INTRAVENOUS at 21:52

## 2021-10-28 RX ADMIN — SODIUM BICARBONATE 50 MEQ: 84 INJECTION INTRAVENOUS at 20:59

## 2021-10-28 RX ADMIN — DIPHENHYDRAMINE HYDROCHLORIDE 25 MG: 50 INJECTION, SOLUTION INTRAMUSCULAR; INTRAVENOUS at 20:59

## 2021-10-28 RX ADMIN — SODIUM BICARBONATE 125 MEQ: 84 INJECTION INTRAVENOUS at 21:35

## 2021-10-28 RX ADMIN — PANTOPRAZOLE SODIUM 80 MG: 40 INJECTION, POWDER, FOR SOLUTION INTRAVENOUS at 20:59

## 2021-10-28 RX ADMIN — Medication 10 ML: at 21:35

## 2021-10-28 RX ADMIN — LEVOFLOXACIN 500 MG: 500 INJECTION, SOLUTION INTRAVENOUS at 22:45

## 2021-10-28 RX ADMIN — ONDANSETRON 4 MG: 2 INJECTION INTRAMUSCULAR; INTRAVENOUS at 17:52

## 2021-10-28 RX ADMIN — FENTANYL CITRATE 25 MCG: 50 INJECTION INTRAMUSCULAR; INTRAVENOUS at 17:52

## 2021-10-28 RX ADMIN — DIPHENHYDRAMINE HYDROCHLORIDE AND LIDOCAINE HYDROCHLORIDE AND ALUMINUM HYDROXIDE AND MAGNESIUM HYDRO 5 ML: KIT at 22:45

## 2021-10-28 NOTE — TELEPHONE ENCOUNTER
TOM on VM informing I was calling to make sure he was going to try to get his lab work done ASAP, which may be where he is right now.  If not, please get done ASAP or call us otherwise.

## 2021-10-29 ENCOUNTER — APPOINTMENT (OUTPATIENT)
Dept: LAB | Facility: HOSPITAL | Age: 71
End: 2021-10-29

## 2021-10-29 ENCOUNTER — APPOINTMENT (OUTPATIENT)
Dept: ONCOLOGY | Facility: HOSPITAL | Age: 71
End: 2021-10-29

## 2021-10-29 LAB
ALBUMIN SERPL-MCNC: 3.2 G/DL (ref 3.5–5.2)
ALBUMIN/GLOB SERPL: 1.2 G/DL
ALP SERPL-CCNC: 146 U/L (ref 39–117)
ALT SERPL W P-5'-P-CCNC: 6 U/L (ref 1–41)
ANION GAP SERPL CALCULATED.3IONS-SCNC: 17 MMOL/L (ref 5–15)
ANION GAP SERPL CALCULATED.3IONS-SCNC: 18 MMOL/L (ref 5–15)
AST SERPL-CCNC: 14 U/L (ref 1–40)
BILIRUB SERPL-MCNC: 0.2 MG/DL (ref 0–1.2)
BUN SERPL-MCNC: 61 MG/DL (ref 8–23)
BUN SERPL-MCNC: 67 MG/DL (ref 8–23)
BUN/CREAT SERPL: 15.2 (ref 7–25)
BUN/CREAT SERPL: 15.8 (ref 7–25)
CALCIUM SPEC-SCNC: 7.9 MG/DL (ref 8.6–10.5)
CALCIUM SPEC-SCNC: 8 MG/DL (ref 8.6–10.5)
CHLORIDE SERPL-SCNC: 110 MMOL/L (ref 98–107)
CHLORIDE SERPL-SCNC: 111 MMOL/L (ref 98–107)
CHOLEST SERPL-MCNC: 59 MG/DL (ref 0–200)
CO2 SERPL-SCNC: 12 MMOL/L (ref 22–29)
CO2 SERPL-SCNC: 13 MMOL/L (ref 22–29)
CREAT SERPL-MCNC: 4.01 MG/DL (ref 0.76–1.27)
CREAT SERPL-MCNC: 4.24 MG/DL (ref 0.76–1.27)
DEPRECATED RDW RBC AUTO: 53.4 FL (ref 37–54)
ERYTHROCYTE [DISTWIDTH] IN BLOOD BY AUTOMATED COUNT: 15.3 % (ref 12.3–15.4)
GFR SERPL CREATININE-BSD FRML MDRD: 14 ML/MIN/1.73
GFR SERPL CREATININE-BSD FRML MDRD: 15 ML/MIN/1.73
GFR SERPL CREATININE-BSD FRML MDRD: ABNORMAL ML/MIN/{1.73_M2}
GLOBULIN UR ELPH-MCNC: 2.6 GM/DL
GLUCOSE SERPL-MCNC: 107 MG/DL (ref 65–99)
GLUCOSE SERPL-MCNC: 128 MG/DL (ref 65–99)
HBA1C MFR BLD: 4.8 % (ref 4.8–5.6)
HCT VFR BLD AUTO: 20.1 % (ref 37.5–51)
HCT VFR BLD AUTO: 22.6 % (ref 37.5–51)
HDLC SERPL-MCNC: 23 MG/DL (ref 40–60)
HGB BLD-MCNC: 6.5 G/DL (ref 13–17.7)
HGB BLD-MCNC: 7.5 G/DL (ref 13–17.7)
LDLC SERPL CALC-MCNC: 17 MG/DL (ref 0–100)
LDLC/HDLC SERPL: 0.72 {RATIO}
LYMPHOCYTES # BLD MANUAL: 0.23 10*3/MM3 (ref 0.7–3.1)
LYMPHOCYTES NFR BLD MANUAL: 5 % (ref 19.6–45.3)
LYMPHOCYTES NFR BLD MANUAL: 7 % (ref 5–12)
MCH RBC QN AUTO: 31.4 PG (ref 26.6–33)
MCHC RBC AUTO-ENTMCNC: 33.2 G/DL (ref 31.5–35.7)
MCV RBC AUTO: 94.6 FL (ref 79–97)
MONOCYTES # BLD AUTO: 0.32 10*3/MM3 (ref 0.1–0.9)
MYELOCYTES NFR BLD MANUAL: 1 % (ref 0–0)
NEUTROPHILS # BLD AUTO: 4.04 10*3/MM3 (ref 1.7–7)
NEUTROPHILS NFR BLD MANUAL: 74 % (ref 42.7–76)
NEUTS BAND NFR BLD MANUAL: 13 % (ref 0–5)
NRBC SPEC MANUAL: 1 /100 WBC (ref 0–0.2)
PHOSPHATE SERPL-MCNC: 3.7 MG/DL (ref 2.5–4.5)
PLAT MORPH BLD: NORMAL
PLATELET # BLD AUTO: 151 10*3/MM3 (ref 140–450)
PMV BLD AUTO: 9.7 FL (ref 6–12)
POIKILOCYTOSIS BLD QL SMEAR: ABNORMAL
POLYCHROMASIA BLD QL SMEAR: ABNORMAL
POTASSIUM SERPL-SCNC: 3.7 MMOL/L (ref 3.5–5.2)
POTASSIUM SERPL-SCNC: 4 MMOL/L (ref 3.5–5.2)
PROT SERPL-MCNC: 5.8 G/DL (ref 6–8.5)
QT INTERVAL: 416 MS
QTC INTERVAL: 464 MS
RBC # BLD AUTO: 2.39 10*6/MM3 (ref 4.14–5.8)
SODIUM SERPL-SCNC: 140 MMOL/L (ref 136–145)
SODIUM SERPL-SCNC: 141 MMOL/L (ref 136–145)
TRIGL SERPL-MCNC: 97 MG/DL (ref 0–150)
TSH SERPL DL<=0.05 MIU/L-ACNC: 0.9 UIU/ML (ref 0.27–4.2)
URATE SERPL-MCNC: 9.2 MG/DL (ref 3.4–7)
VIT B12 BLD-MCNC: 1112 PG/ML (ref 211–946)
VLDLC SERPL-MCNC: 19 MG/DL (ref 5–40)
WBC # BLD AUTO: 4.64 10*3/MM3 (ref 3.4–10.8)
WBC MORPH BLD: NORMAL

## 2021-10-29 PROCEDURE — 84550 ASSAY OF BLOOD/URIC ACID: CPT | Performed by: NURSE PRACTITIONER

## 2021-10-29 PROCEDURE — 86923 COMPATIBILITY TEST ELECTRIC: CPT

## 2021-10-29 PROCEDURE — 80053 COMPREHEN METABOLIC PANEL: CPT | Performed by: NURSE PRACTITIONER

## 2021-10-29 PROCEDURE — 25010000002 IRON SUCROSE PER 1 MG: Performed by: NURSE PRACTITIONER

## 2021-10-29 PROCEDURE — 85025 COMPLETE CBC W/AUTO DIFF WBC: CPT | Performed by: NURSE PRACTITIONER

## 2021-10-29 PROCEDURE — 85007 BL SMEAR W/DIFF WBC COUNT: CPT | Performed by: NURSE PRACTITIONER

## 2021-10-29 PROCEDURE — 36415 COLL VENOUS BLD VENIPUNCTURE: CPT | Performed by: NURSE PRACTITIONER

## 2021-10-29 PROCEDURE — 83036 HEMOGLOBIN GLYCOSYLATED A1C: CPT | Performed by: NURSE PRACTITIONER

## 2021-10-29 PROCEDURE — 84443 ASSAY THYROID STIM HORMONE: CPT | Performed by: NURSE PRACTITIONER

## 2021-10-29 PROCEDURE — 85018 HEMOGLOBIN: CPT | Performed by: NURSE PRACTITIONER

## 2021-10-29 PROCEDURE — P9016 RBC LEUKOCYTES REDUCED: HCPCS

## 2021-10-29 PROCEDURE — 80061 LIPID PANEL: CPT | Performed by: NURSE PRACTITIONER

## 2021-10-29 PROCEDURE — 86900 BLOOD TYPING SEROLOGIC ABO: CPT

## 2021-10-29 PROCEDURE — 85014 HEMATOCRIT: CPT | Performed by: NURSE PRACTITIONER

## 2021-10-29 PROCEDURE — 99222 1ST HOSP IP/OBS MODERATE 55: CPT | Performed by: INTERNAL MEDICINE

## 2021-10-29 PROCEDURE — 36430 TRANSFUSION BLD/BLD COMPNT: CPT

## 2021-10-29 PROCEDURE — 84100 ASSAY OF PHOSPHORUS: CPT | Performed by: NURSE PRACTITIONER

## 2021-10-29 RX ORDER — LEVOFLOXACIN 5 MG/ML
500 INJECTION, SOLUTION INTRAVENOUS
Status: DISCONTINUED | OUTPATIENT
Start: 2021-10-30 | End: 2021-10-31

## 2021-10-29 RX ORDER — LABETALOL HYDROCHLORIDE 5 MG/ML
20 INJECTION, SOLUTION INTRAVENOUS ONCE
Status: DISCONTINUED | OUTPATIENT
Start: 2021-10-29 | End: 2021-11-03 | Stop reason: HOSPADM

## 2021-10-29 RX ADMIN — METRONIDAZOLE 500 MG: 500 INJECTION, SOLUTION INTRAVENOUS at 20:20

## 2021-10-29 RX ADMIN — PANTOPRAZOLE SODIUM 8 MG/HR: 40 INJECTION, POWDER, FOR SOLUTION INTRAVENOUS at 08:25

## 2021-10-29 RX ADMIN — SODIUM BICARBONATE 650 MG: 650 TABLET ORAL at 13:46

## 2021-10-29 RX ADMIN — METOPROLOL SUCCINATE 25 MG: 25 TABLET, EXTENDED RELEASE ORAL at 13:46

## 2021-10-29 RX ADMIN — PANTOPRAZOLE SODIUM 8 MG/HR: 40 INJECTION, POWDER, FOR SOLUTION INTRAVENOUS at 02:49

## 2021-10-29 RX ADMIN — SODIUM BICARBONATE 125 MEQ: 84 INJECTION INTRAVENOUS at 23:00

## 2021-10-29 RX ADMIN — DIPHENHYDRAMINE HYDROCHLORIDE AND LIDOCAINE HYDROCHLORIDE AND ALUMINUM HYDROXIDE AND MAGNESIUM HYDRO 5 ML: KIT at 04:36

## 2021-10-29 RX ADMIN — MESALAMINE 1.5 G: 375 CAPSULE, EXTENDED RELEASE ORAL at 13:46

## 2021-10-29 RX ADMIN — IRON SUCROSE 200 MG: 20 INJECTION, SOLUTION INTRAVENOUS at 23:00

## 2021-10-29 RX ADMIN — METRONIDAZOLE 500 MG: 500 INJECTION, SOLUTION INTRAVENOUS at 04:37

## 2021-10-29 RX ADMIN — DIPHENHYDRAMINE HYDROCHLORIDE AND LIDOCAINE HYDROCHLORIDE AND ALUMINUM HYDROXIDE AND MAGNESIUM HYDRO 5 ML: KIT at 23:00

## 2021-10-29 RX ADMIN — DIPHENHYDRAMINE HYDROCHLORIDE AND LIDOCAINE HYDROCHLORIDE AND ALUMINUM HYDROXIDE AND MAGNESIUM HYDRO 5 ML: KIT at 19:01

## 2021-10-29 RX ADMIN — AZELASTINE HYDROCHLORIDE 1 SPRAY: 137 SPRAY, METERED NASAL at 23:00

## 2021-10-29 RX ADMIN — METRONIDAZOLE 500 MG: 500 INJECTION, SOLUTION INTRAVENOUS at 13:10

## 2021-10-29 RX ADMIN — SODIUM BICARBONATE 650 MG: 650 TABLET ORAL at 23:00

## 2021-10-29 RX ADMIN — DIPHENHYDRAMINE HYDROCHLORIDE AND LIDOCAINE HYDROCHLORIDE AND ALUMINUM HYDROXIDE AND MAGNESIUM HYDRO 5 ML: KIT at 13:48

## 2021-10-29 RX ADMIN — PANTOPRAZOLE SODIUM 8 MG/HR: 40 INJECTION, POWDER, FOR SOLUTION INTRAVENOUS at 23:44

## 2021-10-29 RX ADMIN — AZELASTINE HYDROCHLORIDE 1 SPRAY: 137 SPRAY, METERED NASAL at 13:48

## 2021-10-29 RX ADMIN — CLONIDINE HYDROCHLORIDE 0.6 MG: 0.2 TABLET ORAL at 13:46

## 2021-10-29 RX ADMIN — PANTOPRAZOLE SODIUM 8 MG/HR: 40 INJECTION, POWDER, FOR SOLUTION INTRAVENOUS at 18:58

## 2021-10-29 RX ADMIN — Medication 10 ML: at 20:27

## 2021-10-29 RX ADMIN — AMLODIPINE BESYLATE 10 MG: 10 TABLET ORAL at 13:46

## 2021-10-29 RX ADMIN — DIPHENHYDRAMINE HYDROCHLORIDE AND LIDOCAINE HYDROCHLORIDE AND ALUMINUM HYDROXIDE AND MAGNESIUM HYDRO 5 ML: KIT at 06:34

## 2021-10-29 RX ADMIN — Medication 10 ML: at 13:46

## 2021-10-29 RX ADMIN — DIPHENHYDRAMINE HYDROCHLORIDE AND LIDOCAINE HYDROCHLORIDE AND ALUMINUM HYDROXIDE AND MAGNESIUM HYDRO 5 ML: KIT at 15:15

## 2021-10-30 LAB
ALBUMIN SERPL-MCNC: 3 G/DL (ref 3.5–5.2)
ALBUMIN/GLOB SERPL: 1.8 G/DL
ALP SERPL-CCNC: 131 U/L (ref 39–117)
ALT SERPL W P-5'-P-CCNC: 8 U/L (ref 1–41)
ANION GAP SERPL CALCULATED.3IONS-SCNC: 9 MMOL/L (ref 5–15)
AST SERPL-CCNC: 21 U/L (ref 1–40)
BACTERIA SPEC AEROBE CULT: NORMAL
BH BB BLOOD EXPIRATION DATE: NORMAL
BH BB BLOOD TYPE BARCODE: 9500
BH BB DISPENSE STATUS: NORMAL
BH BB PRODUCT CODE: NORMAL
BH BB UNIT NUMBER: NORMAL
BILIRUB SERPL-MCNC: 0.3 MG/DL (ref 0–1.2)
BUN SERPL-MCNC: 48 MG/DL (ref 8–23)
BUN/CREAT SERPL: 15.5 (ref 7–25)
CALCIUM SPEC-SCNC: 7.7 MG/DL (ref 8.6–10.5)
CHLORIDE SERPL-SCNC: 111 MMOL/L (ref 98–107)
CO2 SERPL-SCNC: 24 MMOL/L (ref 22–29)
CREAT SERPL-MCNC: 3.09 MG/DL (ref 0.76–1.27)
CROSSMATCH INTERPRETATION: NORMAL
GFR SERPL CREATININE-BSD FRML MDRD: 20 ML/MIN/1.73
GLOBULIN UR ELPH-MCNC: 1.7 GM/DL
GLUCOSE SERPL-MCNC: 113 MG/DL (ref 65–99)
HCT VFR BLD AUTO: 24 % (ref 37.5–51)
HCT VFR BLD AUTO: 25.4 % (ref 37.5–51)
HCT VFR BLD AUTO: 26.1 % (ref 37.5–51)
HGB BLD-MCNC: 7.9 G/DL (ref 13–17.7)
HGB BLD-MCNC: 8.2 G/DL (ref 13–17.7)
HGB BLD-MCNC: 8.6 G/DL (ref 13–17.7)
MAGNESIUM SERPL-MCNC: 1.6 MG/DL (ref 1.6–2.4)
POTASSIUM SERPL-SCNC: 3.2 MMOL/L (ref 3.5–5.2)
PROT SERPL-MCNC: 4.7 G/DL (ref 6–8.5)
SODIUM SERPL-SCNC: 144 MMOL/L (ref 136–145)
UNIT  ABO: NORMAL
UNIT  RH: NORMAL

## 2021-10-30 PROCEDURE — 85018 HEMOGLOBIN: CPT | Performed by: NURSE PRACTITIONER

## 2021-10-30 PROCEDURE — 99222 1ST HOSP IP/OBS MODERATE 55: CPT | Performed by: INTERNAL MEDICINE

## 2021-10-30 PROCEDURE — 85014 HEMATOCRIT: CPT | Performed by: NURSE PRACTITIONER

## 2021-10-30 PROCEDURE — 25010000002 LEVOFLOXACIN PER 250 MG: Performed by: INTERNAL MEDICINE

## 2021-10-30 PROCEDURE — 36415 COLL VENOUS BLD VENIPUNCTURE: CPT | Performed by: NURSE PRACTITIONER

## 2021-10-30 PROCEDURE — 83735 ASSAY OF MAGNESIUM: CPT | Performed by: NURSE PRACTITIONER

## 2021-10-30 PROCEDURE — 80053 COMPREHEN METABOLIC PANEL: CPT | Performed by: NURSE PRACTITIONER

## 2021-10-30 PROCEDURE — 25010000002 IRON SUCROSE PER 1 MG: Performed by: NURSE PRACTITIONER

## 2021-10-30 RX ORDER — SODIUM CHLORIDE, SODIUM LACTATE, POTASSIUM CHLORIDE, CALCIUM CHLORIDE 600; 310; 30; 20 MG/100ML; MG/100ML; MG/100ML; MG/100ML
50 INJECTION, SOLUTION INTRAVENOUS CONTINUOUS
Status: DISCONTINUED | OUTPATIENT
Start: 2021-10-30 | End: 2021-11-03

## 2021-10-30 RX ORDER — POTASSIUM CHLORIDE 750 MG/1
40 CAPSULE, EXTENDED RELEASE ORAL DAILY
Status: DISCONTINUED | OUTPATIENT
Start: 2021-10-30 | End: 2021-11-03 | Stop reason: HOSPADM

## 2021-10-30 RX ADMIN — METRONIDAZOLE 500 MG: 500 INJECTION, SOLUTION INTRAVENOUS at 21:53

## 2021-10-30 RX ADMIN — AMLODIPINE BESYLATE 10 MG: 10 TABLET ORAL at 08:16

## 2021-10-30 RX ADMIN — SODIUM CHLORIDE, POTASSIUM CHLORIDE, SODIUM LACTATE AND CALCIUM CHLORIDE 75 ML/HR: 600; 310; 30; 20 INJECTION, SOLUTION INTRAVENOUS at 08:12

## 2021-10-30 RX ADMIN — SODIUM BICARBONATE 650 MG: 650 TABLET ORAL at 08:17

## 2021-10-30 RX ADMIN — Medication 10 ML: at 08:16

## 2021-10-30 RX ADMIN — PANTOPRAZOLE SODIUM 8 MG/HR: 40 INJECTION, POWDER, FOR SOLUTION INTRAVENOUS at 10:42

## 2021-10-30 RX ADMIN — LEVOFLOXACIN 500 MG: 500 INJECTION, SOLUTION INTRAVENOUS at 23:20

## 2021-10-30 RX ADMIN — DIPHENHYDRAMINE HYDROCHLORIDE AND LIDOCAINE HYDROCHLORIDE AND ALUMINUM HYDROXIDE AND MAGNESIUM HYDRO 5 ML: KIT at 10:43

## 2021-10-30 RX ADMIN — AZELASTINE HYDROCHLORIDE 1 SPRAY: 137 SPRAY, METERED NASAL at 08:15

## 2021-10-30 RX ADMIN — POTASSIUM CHLORIDE 40 MEQ: 10 CAPSULE, COATED, EXTENDED RELEASE ORAL at 08:16

## 2021-10-30 RX ADMIN — AZELASTINE HYDROCHLORIDE 1 SPRAY: 137 SPRAY, METERED NASAL at 20:50

## 2021-10-30 RX ADMIN — MESALAMINE 1.5 G: 375 CAPSULE, EXTENDED RELEASE ORAL at 08:17

## 2021-10-30 RX ADMIN — SODIUM BICARBONATE 650 MG: 650 TABLET ORAL at 23:24

## 2021-10-30 RX ADMIN — DIPHENHYDRAMINE HYDROCHLORIDE AND LIDOCAINE HYDROCHLORIDE AND ALUMINUM HYDROXIDE AND MAGNESIUM HYDRO 5 ML: KIT at 08:17

## 2021-10-30 RX ADMIN — LEVOTHYROXINE SODIUM 75 MCG: 75 TABLET ORAL at 06:01

## 2021-10-30 RX ADMIN — DIPHENHYDRAMINE HYDROCHLORIDE AND LIDOCAINE HYDROCHLORIDE AND ALUMINUM HYDROXIDE AND MAGNESIUM HYDRO 5 ML: KIT at 15:09

## 2021-10-30 RX ADMIN — DIPHENHYDRAMINE HYDROCHLORIDE AND LIDOCAINE HYDROCHLORIDE AND ALUMINUM HYDROXIDE AND MAGNESIUM HYDRO 5 ML: KIT at 23:23

## 2021-10-30 RX ADMIN — METOPROLOL SUCCINATE 25 MG: 25 TABLET, EXTENDED RELEASE ORAL at 08:17

## 2021-10-30 RX ADMIN — METRONIDAZOLE 500 MG: 500 INJECTION, SOLUTION INTRAVENOUS at 06:01

## 2021-10-30 RX ADMIN — IRON SUCROSE 200 MG: 20 INJECTION, SOLUTION INTRAVENOUS at 20:47

## 2021-10-30 RX ADMIN — METRONIDAZOLE 500 MG: 500 INJECTION, SOLUTION INTRAVENOUS at 14:12

## 2021-10-30 RX ADMIN — PANTOPRAZOLE SODIUM 8 MG/HR: 40 INJECTION, POWDER, FOR SOLUTION INTRAVENOUS at 22:40

## 2021-10-30 RX ADMIN — CLONIDINE HYDROCHLORIDE 0.6 MG: 0.2 TABLET ORAL at 08:17

## 2021-10-30 RX ADMIN — SODIUM CHLORIDE, POTASSIUM CHLORIDE, SODIUM LACTATE AND CALCIUM CHLORIDE 75 ML/HR: 600; 310; 30; 20 INJECTION, SOLUTION INTRAVENOUS at 22:40

## 2021-10-30 RX ADMIN — Medication 10 ML: at 20:50

## 2021-10-30 RX ADMIN — PANTOPRAZOLE SODIUM 8 MG/HR: 40 INJECTION, POWDER, FOR SOLUTION INTRAVENOUS at 04:46

## 2021-10-30 RX ADMIN — DIPHENHYDRAMINE HYDROCHLORIDE AND LIDOCAINE HYDROCHLORIDE AND ALUMINUM HYDROXIDE AND MAGNESIUM HYDRO 5 ML: KIT at 04:45

## 2021-10-30 RX ADMIN — DIPHENHYDRAMINE HYDROCHLORIDE AND LIDOCAINE HYDROCHLORIDE AND ALUMINUM HYDROXIDE AND MAGNESIUM HYDRO 5 ML: KIT at 20:50

## 2021-10-31 PROBLEM — E87.6 HYPOKALEMIA: Status: ACTIVE | Noted: 2021-10-31

## 2021-10-31 PROBLEM — E83.42 HYPOMAGNESEMIA: Status: ACTIVE | Noted: 2021-10-31

## 2021-10-31 LAB
ALBUMIN SERPL-MCNC: 2.9 G/DL (ref 3.5–5.2)
ALBUMIN/GLOB SERPL: 1.3 G/DL
ALP SERPL-CCNC: 122 U/L (ref 39–117)
ALT SERPL W P-5'-P-CCNC: 8 U/L (ref 1–41)
ANION GAP SERPL CALCULATED.3IONS-SCNC: 9 MMOL/L (ref 5–15)
AST SERPL-CCNC: 21 U/L (ref 1–40)
BILIRUB SERPL-MCNC: 0.2 MG/DL (ref 0–1.2)
BUN SERPL-MCNC: 32 MG/DL (ref 8–23)
BUN/CREAT SERPL: 13.1 (ref 7–25)
CALCIUM SPEC-SCNC: 7.3 MG/DL (ref 8.6–10.5)
CHLORIDE SERPL-SCNC: 111 MMOL/L (ref 98–107)
CO2 SERPL-SCNC: 23 MMOL/L (ref 22–29)
CREAT SERPL-MCNC: 2.44 MG/DL (ref 0.76–1.27)
GFR SERPL CREATININE-BSD FRML MDRD: 26 ML/MIN/1.73
GLOBULIN UR ELPH-MCNC: 2.2 GM/DL
GLUCOSE SERPL-MCNC: 94 MG/DL (ref 65–99)
HCT VFR BLD AUTO: 24.9 % (ref 37.5–51)
HCT VFR BLD AUTO: 26.7 % (ref 37.5–51)
HGB BLD-MCNC: 8 G/DL (ref 13–17.7)
HGB BLD-MCNC: 8.7 G/DL (ref 13–17.7)
MAGNESIUM SERPL-MCNC: 1.3 MG/DL (ref 1.6–2.4)
POTASSIUM SERPL-SCNC: 3.1 MMOL/L (ref 3.5–5.2)
PROT SERPL-MCNC: 5.1 G/DL (ref 6–8.5)
SODIUM SERPL-SCNC: 143 MMOL/L (ref 136–145)

## 2021-10-31 PROCEDURE — 25010000002 MAGNESIUM SULFATE IN D5W 1G/100ML (PREMIX) 1-5 GM/100ML-% SOLUTION: Performed by: INTERNAL MEDICINE

## 2021-10-31 PROCEDURE — 80053 COMPREHEN METABOLIC PANEL: CPT | Performed by: NURSE PRACTITIONER

## 2021-10-31 PROCEDURE — 83735 ASSAY OF MAGNESIUM: CPT | Performed by: NURSE PRACTITIONER

## 2021-10-31 PROCEDURE — 85014 HEMATOCRIT: CPT | Performed by: NURSE PRACTITIONER

## 2021-10-31 PROCEDURE — 85018 HEMOGLOBIN: CPT | Performed by: NURSE PRACTITIONER

## 2021-10-31 RX ORDER — LEVOFLOXACIN 5 MG/ML
750 INJECTION, SOLUTION INTRAVENOUS
Status: DISCONTINUED | OUTPATIENT
Start: 2021-11-01 | End: 2021-11-02

## 2021-10-31 RX ORDER — MAGNESIUM SULFATE 1 G/100ML
1 INJECTION INTRAVENOUS EVERY 12 HOURS
Status: COMPLETED | OUTPATIENT
Start: 2021-10-31 | End: 2021-10-31

## 2021-10-31 RX ADMIN — AMLODIPINE BESYLATE 10 MG: 10 TABLET ORAL at 08:37

## 2021-10-31 RX ADMIN — METRONIDAZOLE 500 MG: 500 INJECTION, SOLUTION INTRAVENOUS at 13:08

## 2021-10-31 RX ADMIN — Medication 10 ML: at 20:42

## 2021-10-31 RX ADMIN — DIPHENHYDRAMINE HYDROCHLORIDE AND LIDOCAINE HYDROCHLORIDE AND ALUMINUM HYDROXIDE AND MAGNESIUM HYDRO 5 ML: KIT at 04:16

## 2021-10-31 RX ADMIN — PANTOPRAZOLE SODIUM 8 MG/HR: 40 INJECTION, POWDER, FOR SOLUTION INTRAVENOUS at 15:28

## 2021-10-31 RX ADMIN — METRONIDAZOLE 500 MG: 500 INJECTION, SOLUTION INTRAVENOUS at 20:41

## 2021-10-31 RX ADMIN — AZELASTINE HYDROCHLORIDE 1 SPRAY: 137 SPRAY, METERED NASAL at 20:40

## 2021-10-31 RX ADMIN — MESALAMINE 1.5 G: 375 CAPSULE, EXTENDED RELEASE ORAL at 08:38

## 2021-10-31 RX ADMIN — SODIUM BICARBONATE 650 MG: 650 TABLET ORAL at 08:38

## 2021-10-31 RX ADMIN — DIPHENHYDRAMINE HYDROCHLORIDE AND LIDOCAINE HYDROCHLORIDE AND ALUMINUM HYDROXIDE AND MAGNESIUM HYDRO 5 ML: KIT at 08:36

## 2021-10-31 RX ADMIN — CLONIDINE HYDROCHLORIDE 0.6 MG: 0.2 TABLET ORAL at 08:38

## 2021-10-31 RX ADMIN — SODIUM CHLORIDE, POTASSIUM CHLORIDE, SODIUM LACTATE AND CALCIUM CHLORIDE 50 ML/HR: 600; 310; 30; 20 INJECTION, SOLUTION INTRAVENOUS at 16:40

## 2021-10-31 RX ADMIN — PANTOPRAZOLE SODIUM 8 MG/HR: 40 INJECTION, POWDER, FOR SOLUTION INTRAVENOUS at 09:39

## 2021-10-31 RX ADMIN — PANTOPRAZOLE SODIUM 8 MG/HR: 40 INJECTION, POWDER, FOR SOLUTION INTRAVENOUS at 20:40

## 2021-10-31 RX ADMIN — MAGNESIUM SULFATE 1 G: 1 INJECTION INTRAVENOUS at 11:10

## 2021-10-31 RX ADMIN — SODIUM BICARBONATE 650 MG: 650 TABLET ORAL at 20:40

## 2021-10-31 RX ADMIN — METRONIDAZOLE 500 MG: 500 INJECTION, SOLUTION INTRAVENOUS at 04:16

## 2021-10-31 RX ADMIN — METOPROLOL SUCCINATE 25 MG: 25 TABLET, EXTENDED RELEASE ORAL at 08:37

## 2021-10-31 RX ADMIN — POTASSIUM CHLORIDE 40 MEQ: 10 CAPSULE, COATED, EXTENDED RELEASE ORAL at 08:37

## 2021-10-31 RX ADMIN — MAGNESIUM SULFATE 1 G: 1 INJECTION INTRAVENOUS at 23:45

## 2021-10-31 RX ADMIN — LEVOTHYROXINE SODIUM 75 MCG: 75 TABLET ORAL at 06:14

## 2021-10-31 RX ADMIN — PANTOPRAZOLE SODIUM 8 MG/HR: 40 INJECTION, POWDER, FOR SOLUTION INTRAVENOUS at 04:17

## 2021-11-01 LAB
ALBUMIN SERPL-MCNC: 3.4 G/DL (ref 3.5–5.2)
ALBUMIN/GLOB SERPL: 1.3 G/DL
ALP SERPL-CCNC: 151 U/L (ref 39–117)
ALT SERPL W P-5'-P-CCNC: 9 U/L (ref 1–41)
ANION GAP SERPL CALCULATED.3IONS-SCNC: 10 MMOL/L (ref 5–15)
AST SERPL-CCNC: 22 U/L (ref 1–40)
BH BB BLOOD EXPIRATION DATE: NORMAL
BH BB BLOOD EXPIRATION DATE: NORMAL
BH BB BLOOD TYPE BARCODE: 9500
BH BB BLOOD TYPE BARCODE: 9500
BH BB DISPENSE STATUS: NORMAL
BH BB DISPENSE STATUS: NORMAL
BH BB PRODUCT CODE: NORMAL
BH BB PRODUCT CODE: NORMAL
BH BB UNIT NUMBER: NORMAL
BH BB UNIT NUMBER: NORMAL
BILIRUB SERPL-MCNC: 0.3 MG/DL (ref 0–1.2)
BUN SERPL-MCNC: 28 MG/DL (ref 8–23)
BUN/CREAT SERPL: 10.5 (ref 7–25)
CALCIUM SPEC-SCNC: 8 MG/DL (ref 8.6–10.5)
CHLORIDE SERPL-SCNC: 108 MMOL/L (ref 98–107)
CO2 SERPL-SCNC: 23 MMOL/L (ref 22–29)
CREAT SERPL-MCNC: 2.67 MG/DL (ref 0.76–1.27)
CREAT UR-MCNC: 40.9 MG/DL
CROSSMATCH INTERPRETATION: NORMAL
CROSSMATCH INTERPRETATION: NORMAL
FLUAV RNA RESP QL NAA+PROBE: NOT DETECTED
FLUBV RNA RESP QL NAA+PROBE: NOT DETECTED
GFR SERPL CREATININE-BSD FRML MDRD: 24 ML/MIN/1.73
GLOBULIN UR ELPH-MCNC: 2.6 GM/DL
GLUCOSE SERPL-MCNC: 100 MG/DL (ref 65–99)
HCT VFR BLD AUTO: 31.5 % (ref 37.5–51)
HGB BLD-MCNC: 10.1 G/DL (ref 13–17.7)
MAGNESIUM SERPL-MCNC: 1.7 MG/DL (ref 1.6–2.4)
POTASSIUM SERPL-SCNC: 3.7 MMOL/L (ref 3.5–5.2)
PROT SERPL-MCNC: 6 G/DL (ref 6–8.5)
PROT UR-MCNC: 99.4 MG/DL
SARS-COV-2 RNA RESP QL NAA+PROBE: NOT DETECTED
SODIUM SERPL-SCNC: 141 MMOL/L (ref 136–145)
SODIUM UR-SCNC: 81 MMOL/L
UNIT  ABO: NORMAL
UNIT  ABO: NORMAL
UNIT  RH: NORMAL
UNIT  RH: NORMAL

## 2021-11-01 PROCEDURE — 99232 SBSQ HOSP IP/OBS MODERATE 35: CPT | Performed by: NURSE PRACTITIONER

## 2021-11-01 PROCEDURE — 84156 ASSAY OF PROTEIN URINE: CPT | Performed by: NURSE PRACTITIONER

## 2021-11-01 PROCEDURE — 84300 ASSAY OF URINE SODIUM: CPT | Performed by: NURSE PRACTITIONER

## 2021-11-01 PROCEDURE — 80053 COMPREHEN METABOLIC PANEL: CPT | Performed by: NURSE PRACTITIONER

## 2021-11-01 PROCEDURE — 25010000002 LEVOFLOXACIN PER 250 MG: Performed by: INTERNAL MEDICINE

## 2021-11-01 PROCEDURE — 83735 ASSAY OF MAGNESIUM: CPT | Performed by: NURSE PRACTITIONER

## 2021-11-01 PROCEDURE — 85018 HEMOGLOBIN: CPT | Performed by: INTERNAL MEDICINE

## 2021-11-01 PROCEDURE — 82570 ASSAY OF URINE CREATININE: CPT | Performed by: NURSE PRACTITIONER

## 2021-11-01 PROCEDURE — 87636 SARSCOV2 & INF A&B AMP PRB: CPT | Performed by: INTERNAL MEDICINE

## 2021-11-01 PROCEDURE — 85014 HEMATOCRIT: CPT | Performed by: INTERNAL MEDICINE

## 2021-11-01 RX ORDER — SODIUM BICARBONATE 650 MG/1
325 TABLET ORAL 2 TIMES DAILY
Status: DISCONTINUED | OUTPATIENT
Start: 2021-11-01 | End: 2021-11-03 | Stop reason: HOSPADM

## 2021-11-01 RX ORDER — PANTOPRAZOLE SODIUM 40 MG/10ML
40 INJECTION, POWDER, LYOPHILIZED, FOR SOLUTION INTRAVENOUS
Status: DISCONTINUED | OUTPATIENT
Start: 2021-11-01 | End: 2021-11-02

## 2021-11-01 RX ORDER — MESALAMINE 375 MG/1
CAPSULE, EXTENDED RELEASE ORAL
Qty: 360 CAPSULE | Refills: 0 | Status: SHIPPED | OUTPATIENT
Start: 2021-11-01 | End: 2022-01-31

## 2021-11-01 RX ORDER — POLYETHYLENE GLYCOL 3350 17 G/17G
17 POWDER, FOR SOLUTION ORAL DAILY
Status: DISCONTINUED | OUTPATIENT
Start: 2021-11-01 | End: 2021-11-03 | Stop reason: HOSPADM

## 2021-11-01 RX ADMIN — POLYETHYLENE GLYCOL 3350 17 G: 17 POWDER, FOR SOLUTION ORAL at 11:22

## 2021-11-01 RX ADMIN — METRONIDAZOLE 500 MG: 500 INJECTION, SOLUTION INTRAVENOUS at 21:53

## 2021-11-01 RX ADMIN — SODIUM BICARBONATE 650 MG: 650 TABLET ORAL at 08:44

## 2021-11-01 RX ADMIN — LEVOTHYROXINE SODIUM 75 MCG: 75 TABLET ORAL at 05:09

## 2021-11-01 RX ADMIN — PANTOPRAZOLE SODIUM 8 MG/HR: 40 INJECTION, POWDER, FOR SOLUTION INTRAVENOUS at 07:32

## 2021-11-01 RX ADMIN — POTASSIUM CHLORIDE 40 MEQ: 10 CAPSULE, COATED, EXTENDED RELEASE ORAL at 08:43

## 2021-11-01 RX ADMIN — LEVOFLOXACIN 750 MG: 5 INJECTION, SOLUTION INTRAVENOUS at 11:22

## 2021-11-01 RX ADMIN — METRONIDAZOLE 500 MG: 500 INJECTION, SOLUTION INTRAVENOUS at 12:20

## 2021-11-01 RX ADMIN — AZELASTINE HYDROCHLORIDE 1 SPRAY: 137 SPRAY, METERED NASAL at 21:53

## 2021-11-01 RX ADMIN — METRONIDAZOLE 500 MG: 500 INJECTION, SOLUTION INTRAVENOUS at 05:09

## 2021-11-01 RX ADMIN — METOPROLOL SUCCINATE 25 MG: 25 TABLET, EXTENDED RELEASE ORAL at 08:44

## 2021-11-01 RX ADMIN — SODIUM CHLORIDE, POTASSIUM CHLORIDE, SODIUM LACTATE AND CALCIUM CHLORIDE 50 ML/HR: 600; 310; 30; 20 INJECTION, SOLUTION INTRAVENOUS at 18:21

## 2021-11-01 RX ADMIN — AMLODIPINE BESYLATE 10 MG: 10 TABLET ORAL at 08:44

## 2021-11-01 RX ADMIN — CLONIDINE HYDROCHLORIDE 0.6 MG: 0.2 TABLET ORAL at 08:44

## 2021-11-01 RX ADMIN — Medication 10 ML: at 08:46

## 2021-11-01 RX ADMIN — PANTOPRAZOLE SODIUM 8 MG/HR: 40 INJECTION, POWDER, FOR SOLUTION INTRAVENOUS at 02:01

## 2021-11-01 RX ADMIN — AZELASTINE HYDROCHLORIDE 1 SPRAY: 137 SPRAY, METERED NASAL at 08:46

## 2021-11-01 RX ADMIN — DIPHENHYDRAMINE HYDROCHLORIDE AND LIDOCAINE HYDROCHLORIDE AND ALUMINUM HYDROXIDE AND MAGNESIUM HYDRO 5 ML: KIT at 03:02

## 2021-11-01 RX ADMIN — Medication 10 ML: at 21:52

## 2021-11-01 RX ADMIN — PANTOPRAZOLE SODIUM 40 MG: 40 INJECTION, POWDER, FOR SOLUTION INTRAVENOUS at 18:07

## 2021-11-01 RX ADMIN — MESALAMINE 1.5 G: 375 CAPSULE, EXTENDED RELEASE ORAL at 08:44

## 2021-11-01 RX ADMIN — SODIUM BICARBONATE 325 MG: 650 TABLET ORAL at 21:48

## 2021-11-01 NOTE — CASE MANAGEMENT/SOCIAL WORK
Continued Stay Note   Nino     Patient Name: Ricardo Hugo  MRN: [id number]  Today's Date: 11/1/2021    Admit Date: 10/28/2021     Discharge Plan     Row Name 11/01/21 0824       Plan    Plan Home    Patient/Family in Agreement with Plan yes    Plan Comments Pt will return home alone at d/c.  O.T. noted that pt is back to baseline and signed off.  No home needs identified. Will follow.               Discharge Codes    No documentation.                     LINNETTE Ireland

## 2021-11-01 NOTE — PROGRESS NOTES
Nephrology ([facility]) Progress Note      Patient:  Ricardo Hugo  YOB: 1950  Date of Service: 11/1/2021  MRN: [id number]   Acct: [id number]   Primary Care Physician: Joe Velasco MD  Advance Directive:   Code Status and Medical Interventions:   Ordered at: 10/28/21 1839     Code Status:    CPR     Medical Interventions (Level of Support Prior to Arrest):    Full     Admit Date: 10/28/2021       Hospital Day: 4  Referring Provider: Kendall Bermudez MD      Patient personally seen and examined.  Complete chart including Consults, Notes, Operative Reports, Labs, Cardiology, and Radiology studies reviewed as able.        Subjective:  Ricardo Hugo is a 71 y.o. male for whom we were consulted for evaluation and treatment of acute kidney injury. Baseline chronic kidney disease stage 4 with baseline creatinine approximately 2.5. Follows with STERLING Freeman in our office. History of multiple prior AMAYA that are usually due to volume depletion from chronic nausea and diarrhea, which in turn is secondary to Crohn's disease. Other history includes hypertension, CLL, prior ETOH abuse, and CAD with prior CABG.  Patient presented to ER with complaint of over a week of nausea/vomiting, poor PO intake, black watery diarrhea. Also complained of severe mouth/tongue pain. Had taken Aleve a few times this week to try and alleviate the pain. Urine output had been minimal for 24 hours prior to arrival at hospital. Initial labs in ER revealed creatinine 4.15, BUN 71, CO2 9.0, Hgb 8.0.  Started on IV fluids and given 1 unit PRBC.  renal function has improved    Today feeling better, no new overnight issues. Urine output nonoliguric    Allergies:  Lortab [hydrocodone-acetaminophen] and Allopurinol    Home Meds:  Medications Prior to Admission   Medication Sig Dispense Refill Last Dose   • ALPRAZolam (Xanax) 0.25 MG tablet Take 1 tablet by mouth At Night As Needed for Anxiety.  30 tablet 3 10/27/2021 at Unknown time   • aspirin (ASPIR) 81 MG EC tablet Take 81 mg by mouth Daily.   10/28/2021 at Unknown time   • albuterol sulfate  (90 Base) MCG/ACT inhaler USE 2 INHALATIONS FOUR TIMES A DAY AS NEEDED 20.1 g 3    • amLODIPine (NORVASC) 10 MG tablet TAKE 1 TABLET DAILY 90 tablet 3    • atorvastatin (LIPITOR) 10 MG tablet Take 1 tablet by mouth Daily. 90 tablet 3    • azelastine (ASTELIN) 0.1 % nasal spray 1 spray into the nostril(s) as directed by provider 2 (Two) Times a Day. Use in each nostril as directed 90 mL 3    • cholestyramine (QUESTRAN) 4 g packet TAKE 1 PACKET BY MOUTH DAILY 30 each 5    • cloNIDine (CATAPRES) 0.2 MG tablet TAKE 3 TABLETS DAILY 270 tablet 3    • clopidogrel (Plavix) 75 MG tablet Take 1 tablet by mouth Daily. 90 tablet 3    • Copper Gluconate 2 MG tablet Take 2 mg by mouth Daily. 30 tablet 6    • diphenhydrAMINE (Benadryl Allergy) 25 mg capsule Take 25 mg by mouth Every 6 (Six) Hours As Needed for Itching.      • DIPHENOXYLATE-ATROPINE PO Take  by mouth.      • fexofenadine (ALLEGRA) 180 MG tablet Take 180 mg by mouth Daily.      • fluticasone (FLONASE) 50 MCG/ACT nasal spray 2 sprays into the nostril(s) as directed by provider Daily As Needed for Rhinitis.      • furosemide (Lasix) 20 MG tablet Take 1 tablet by mouth Daily. 90 tablet 3    • hydrALAZINE (APRESOLINE) 25 MG tablet Take 1 tablet by mouth 3 (Three) Times a Day. 90 tablet 5    • levothyroxine (Synthroid) 75 MCG tablet Take 1 tablet by mouth Daily. 90 tablet 3    • magnesium chloride ER 64 MG DR tablet Take 143 mg by mouth Daily.      • Melatonin 10 MG capsule Take 2 capsules by mouth Every Night.      • mesalamine (APRISO) 0.375 g 24 hr capsule Take 4 capsules by mouth Daily. 360 capsule 3    • metoprolol succinate XL (TOPROL-XL) 25 MG 24 hr tablet Take 1 tablet by mouth Daily. 90 tablet 3    • Multiple Vitamins-Minerals (PRESERVISION/LUTEIN) capsule Take 1 capsule by mouth 2 (two) times a day.       • omeprazole (priLOSEC) 20 MG capsule Take 1 capsule by mouth Daily. 90 capsule 3    • ondansetron ODT (Zofran ODT) 8 MG disintegrating tablet Place 1 tablet on the tongue Every 8 (Eight) Hours As Needed for Nausea or Vomiting. 12 tablet 1    • potassium chloride 10 MEQ CR tablet Take 1 tablet by mouth 2 (Two) Times a Day. 180 tablet 3    • sodium bicarbonate 650 MG tablet Take 1 tablet by mouth 2 (Two) Times a Day. 180 tablet 3    • valsartan (DIOVAN) 160 MG tablet Take 160 mg by mouth Daily.      • vitamin D (ERGOCALCIFEROL) 1.25 MG (85076 UT) capsule capsule Take 1 capsule by mouth 1 (One) Time Per Week. 15 capsule 3        Medicines:  Current Facility-Administered Medications   Medication Dose Route Frequency Provider Last Rate Last Admin   • acetaminophen (TYLENOL) tablet 650 mg  650 mg Oral Q4H PRN Katherine Camarena, STERLING        Or   • acetaminophen (TYLENOL) 160 MG/5ML solution 650 mg  650 mg Oral Q4H PRN Katherine Camarena APRN        Or   • acetaminophen (TYLENOL) suppository 650 mg  650 mg Rectal Q4H PRN Katherine Camarena, APRN       • ALPRAZolam (XANAX) tablet 0.25 mg  0.25 mg Oral Nightly PRN Katherine Camarena APRSHERITA       • amLODIPine (NORVASC) tablet 10 mg  10 mg Oral Daily Katherine Camarena APRN   10 mg at 11/01/21 0844   • azelastine (ASTELIN) nasal spray 1 spray  1 spray Each Nare BID Katherine Camarena APRN   1 spray at 11/01/21 0846   • cloNIDine (CATAPRES) tablet 0.6 mg  0.6 mg Oral Daily Kathreine Camarena APRN   0.6 mg at 11/01/21 0844   • diphenhydrAMINE (BENADRYL) capsule 25 mg  25 mg Oral Q6H PRN Katherine Camarena APRN       • First Mouthwash (Magic Mouthwash) 5 mL  5 mL Swish & Spit Q4H Katherine Camarena APRN   5 mL at 11/01/21 0302   • HYDROmorphone (DILAUDID) injection 1 mg  1 mg Intravenous Q3H PRN Katherine Camarena APRN       • ipratropium-albuterol (DUO-NEB) nebulizer solution 3 mL  3 mL Nebulization Q6H PRN Katherine Camarena APRN       • labetalol (NORMODYNE,TRANDATE) injection  20 mg  20 mg Intravenous Once Jamie Pineda MD       • lactated ringers infusion  50 mL/hr Intravenous Continuous Jamie Pineda MD 50 mL/hr at 10/31/21 1640 50 mL/hr at 10/31/21 1640   • levoFLOXacin (LEVAQUIN) 750 mg/150 mL D5W (premix) (LEVAQUIN) 750 mg  750 mg Intravenous Q48H Jamie Pineda MD       • levothyroxine (SYNTHROID, LEVOTHROID) tablet 75 mcg  75 mcg Oral Q AM Katherine Camarena APRN   75 mcg at 11/01/21 0509   • mesalamine (APRISO) 24 hr capsule 1.5 g  1.5 g Oral Daily Katherine Camarena APRN   1.5 g at 11/01/21 0844   • metoprolol succinate XL (TOPROL-XL) 24 hr tablet 25 mg  25 mg Oral Daily Katherine Camarena APRN   25 mg at 11/01/21 0844   • metroNIDAZOLE (FLAGYL) 500 mg/100mL IVPB  500 mg Intravenous Q8H Katherine Camarena APRN   500 mg at 11/01/21 0509   • ondansetron (ZOFRAN) tablet 4 mg  4 mg Oral Q6H PRN Katherine Camarena APRN        Or   • ondansetron (ZOFRAN) injection 4 mg  4 mg Intravenous Q6H PRN Katherine Camarena, STERLING       • pantoprazole (PROTONIX) injection 40 mg  40 mg Intravenous BID AC Mike Whitney APRN       • Pharmacy Consult   Does not apply Continuous PRN Katherine Camarena APRN       • polyethylene glycol (MIRALAX) packet 17 g  17 g Oral Daily Mike Whitney APRN       • potassium chloride (MICRO-K) CR capsule 40 mEq  40 mEq Oral Daily Jamie Pineda MD   40 mEq at 11/01/21 0843   • sodium bicarbonate tablet 650 mg  650 mg Oral BID Katherine Camarena APRN   650 mg at 11/01/21 0844   • sodium chloride 0.9 % flush 10 mL  10 mL Intravenous PRN Kendall Bermudez MD       • sodium chloride 0.9 % flush 10 mL  10 mL Intravenous Q12H Katherine Camarena APRN   10 mL at 11/01/21 0846   • sodium chloride 0.9 % flush 10 mL  10 mL Intravenous PRN Katherine Camarena, APRN           Past Medical History:  Past Medical History:   Diagnosis Date   • 3-vessel CAD 8/11/2020   • Allergic rhinitis    • Anxiety disorder 4/27/2020    • Arthritis    • Asymmetrical sensorineural hearing loss 6/28/2017   • Atherosclerosis of native artery of both lower extremities with intermittent claudication ([facility]) 7/18/2019   • Avascular necrosis of femoral head, left ([facility]) 07/11/2020    right hip after surgery   • Carotid stenosis    • Chronic mucoid otitis media    • Chronic rhinitis    • Coronary artery disease     HEART BYPASS 2004   • Crohn's disease of large intestine with other complication ([facility]) 7/30/2020    Chronic diarrhea Colonoscopy July 2020 revealed mild patchy scattered hemosiderin staining with inflammation more so in rectosigmoid area.  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   • Iron deficiency anemia 7/14/2020   • Mixed hearing loss of left ear    • PAD (peripheral artery disease) ([facility]) 8/11/2020   • Perianal abscess    • Pernicious anemia 08/17/2020    took shots but never diagnosed with b12 deficiency   • Personal history of alcoholism ([facility]) 08/11/2020    quit drinking in 2013   • Prostatic hypertrophy 8/11/2020   • Sensorineural hearing loss    • Sepsis with acute renal failure ([facility]) 9/15/2020   • Shortness of breath 5/27/2021   • Tinnitus    • Ventricular tachycardia, nonsustained ([facility]) 7/14/2020   • Weight loss 7/11/2020       Past Surgical History:  Past Surgical History:   Procedure Laterality Date   • ARTERY SURGERY  2021    right side on neck   • CAROTID ENDARTERECTOMY Right 5/10/2021    Procedure: RIGHT CAROTID ENDARTERECTOMY WITH EEG;  Surgeon: Gil Pineda DO;  Location: [facility] OR ;  Service: Vascular;  Laterality: Right;   • COLONOSCOPY N/A 7/2/2020    Procedure:  COLONOSCOPY WITH ANESTHESIA;  Surgeon: Adrien Brewster MD;  Location: [facility] ENDOSCOPY;  Service: Gastroenterology;  Laterality: N/A;  pre op: diarrhea  post op: polyps  PCP: Joe Velasco MD   • COLONOSCOPY N/A 10/13/2020    Procedure: COLONOSCOPY WITH ANESTHESIA;  Surgeon: Adrien Brewster MD;  Location: [facility] ENDOSCOPY;  Service: Gastroenterology;  Laterality: N/A;  Pre: Chronic Diarrhea, Crohn's  Post: AVM  Dr. Neftali Velasco  CO2 Inflation Used   • CORONARY ARTERY BYPASS GRAFT  2003    x3   • EYE SURGERY Bilateral     catorac   • INCISION AND DRAINAGE PERIRECTAL ABSCESS N/A 3/3/2017    Procedure: INCISION AND DRAINAGE OF JEET ANAL ABSCESS;  Surgeon: Lynette Smith MD;  Location: [facility] OR;  Service:    • MYRINGOTOMY W/ TUBES Left 2017    06/10/2016   • TONSILLECTOMY     • TOTAL HIP ARTHROPLASTY Right 2006       Family History  Family History   Problem Relation Age of Onset   • No Known Problems Mother    • No Known Problems Father    • No Known Problems Daughter    • Colon cancer Neg Hx    • Colon polyps Neg Hx        Social History  Social History     Socioeconomic History   • Marital status:    Tobacco Use   • Smoking status: Former Smoker     Packs/day: 0.50     Years: 25.00     Pack years: 12.50     Types: Cigarettes     Start date:      Quit date: 10/13/2013     Years since quittin.0   • Smokeless tobacco: Never Used   • Tobacco comment: quit    Vaping Use   • Vaping Use: Never used   Substance and Sexual Activity   • Alcohol use: Not Currently   • Drug use: No   • Sexual activity: Defer         Review of Systems:  History obtained from chart review and the patient  General ROS: No fever or chills  Respiratory ROS: No cough, shortness of breath, wheezing  Cardiovascular ROS: No chest pain or palpitations  Gastrointestinal ROS: No abdominal pain or melena  Genito-Urinary ROS: No dysuria or hematuria  Psych ROS: No anxiety and depression    Objective:  Patient Vitals for the  past 24 hrs:   BP Temp Temp src Pulse Resp SpO2 Weight   11/01/21 0700 170/65 98.8 °F (37.1 °C) Oral 72 20 98 % --   11/01/21 0311 169/63 98.4 °F (36.9 °C) Oral 67 18 97 % --   11/01/21 0001 153/68 99.5 °F (37.5 °C) Oral 61 18 95 % --   10/31/21 1900 157/68 98.1 °F (36.7 °C) Oral 75 18 100 % 67.4 kg (148 lb 9.6 oz)   10/31/21 1752 145/58 98.7 °F (37.1 °C) Oral 73 18 99 % --   10/31/21 1226 117/65 98 °F (36.7 °C) Oral 71 18 98 % --       Intake/Output Summary (Last 24 hours) at 11/1/2021 1050  Last data filed at 11/1/2021 0800  Gross per 24 hour   Intake 4743 ml   Output 1925 ml   Net 2818 ml     General: awake/alert   Chest:  clear to auscultation bilaterally without respiratory distress  CVS: regular rate and rhythm  Abdominal: soft, nontender, positive bowel sounds  Extremities: no cyanosis or edema  Skin: warm and dry without rash      Labs:  Results from last 7 days   Lab Units 11/01/21  0601 10/31/21  0520 10/30/21  2323 10/29/21  1614 10/29/21  0502 10/28/21  1750 10/28/21  1750   WBC 10*3/mm3  --   --   --   --  4.64  --  16.97*   HEMOGLOBIN g/dL 10.1* 8.7* 8.0*   < > 7.5*   < > 8.0*   HEMATOCRIT % 31.5* 26.7* 24.9*   < > 22.6*   < > 25.5*   PLATELETS 10*3/mm3  --   --   --   --  151  --  348    < > = values in this interval not displayed.         Results from last 7 days   Lab Units 11/01/21  0601 10/31/21  0520 10/30/21  0532   SODIUM mmol/L 141 143 144   POTASSIUM mmol/L 3.7 3.1* 3.2*   CHLORIDE mmol/L 108* 111* 111*   CO2 mmol/L 23.0 23.0 24.0   BUN mg/dL 28* 32* 48*   CREATININE mg/dL 2.67* 2.44* 3.09*   CALCIUM mg/dL 8.0* 7.3* 7.7*   BILIRUBIN mg/dL 0.3 0.2 0.3   ALK PHOS U/L 151* 122* 131*   ALT (SGPT) U/L 9 8 8   AST (SGOT) U/L 22 21 21   GLUCOSE mg/dL 100* 94 113*       Radiology:   Imaging Results (Last 72 Hours)     ** No results found for the last 72 hours. **          Culture:  No results found for: BLOODCX, URINECX, WOUNDCX, MRSACX, RESPCX, STOOLCX      Assessment   1.  Acute kidney injury,  prerenal--resolved  2.  Baseline chronic kidney disease stage 4  3.  Essential hypertension  4.  Crohn's disease  5.  Chronic lymphocytic leukemia  6.  Metabolic acidosis  7.  Anemia of CKD, acute blood loss    Plan:  1.  Planning for upper endoscopy on 11/02  2.  Will continue low-rate IV fluids as pt will be NPO for endo.      Slava Tate, APRN  11/1/2021  10:50 CDT

## 2021-11-01 NOTE — PLAN OF CARE
Goal Outcome Evaluation:  Plan of Care Reviewed With: patient        Progress: no change  Outcome Summary: pt no c/o pain this shift. Pt reports small BM this morning that was formed and brown in color no red or dark stool noted. pt med with miralax for c/o constipation. Pt NPO after midnight for endo in am.

## 2021-11-01 NOTE — PLAN OF CARE
Goal Outcome Evaluation:  Plan of Care Reviewed With: patient        Progress: improving  Outcome Summary: Pt A/Ox4. Denies having pain. Tolerating diet without having n/v. Urine sample collected. Receiving IV fluids, IV abx and protonix gtt. VSS. Encouraged activity. Safety maintained. Sinus rhythm on tele. Will continue to monitor.

## 2021-11-01 NOTE — PROGRESS NOTES
"[facility], [place], operated by [facility]  Inpatient Progress Note      Date of Admission: 10/28/2021  Date of Service:  11/01/21    Reason for Follow Up:  melena    Subjective     Subjective:   Patient lying in bed.  Denies nausea/vomitting.  Tolerated diet last evening without difficulty.  He is complain of constipation.  He reports abdominal \"discomfort\" but denies pain.  Eating does not effect pain.  He has been experiencing increased amount of flatulence.  He has been ambulating in hallway.  No signs of bright red blood per rectum.      Current Facility-Administered Medications:   •  acetaminophen (TYLENOL) tablet 650 mg, 650 mg, Oral, Q4H PRN **OR** acetaminophen (TYLENOL) 160 MG/5ML solution 650 mg, 650 mg, Oral, Q4H PRN **OR** acetaminophen (TYLENOL) suppository 650 mg, 650 mg, Rectal, Q4H PRN, Katherine Camarena, APRN  •  ALPRAZolam (XANAX) tablet 0.25 mg, 0.25 mg, Oral, Nightly PRN, Katherine Camarena, APRN  •  amLODIPine (NORVASC) tablet 10 mg, 10 mg, Oral, Daily, Katherine Camarena, APRN, 10 mg at 11/01/21 0844  •  azelastine (ASTELIN) nasal spray 1 spray, 1 spray, Each Nare, BID, Katherine Camarena, APRN, 1 spray at 11/01/21 0846  •  cloNIDine (CATAPRES) tablet 0.6 mg, 0.6 mg, Oral, Daily, Katherine Camarena, APRN, 0.6 mg at 11/01/21 0844  •  diphenhydrAMINE (BENADRYL) capsule 25 mg, 25 mg, Oral, Q6H PRN, Katherine Camarena, APRN  •  First Mouthwash (Magic Mouthwash) 5 mL, 5 mL, Swish & Spit, Q4H, Katherine Camarena, APRN, 5 mL at 11/01/21 0302  •  HYDROmorphone (DILAUDID) injection 1 mg, 1 mg, Intravenous, Q3H PRN, Katherine Camarena, APRN  •  ipratropium-albuterol (DUO-NEB) nebulizer solution 3 mL, 3 mL, Nebulization, Q6H PRN, Katherine Camarena, STERLING  •  labetalol (NORMODYNE,TRANDATE) injection 20 mg, 20 mg, Intravenous, Once, Jamie Pineda MD  •  lactated ringers infusion, 50 mL/hr, Intravenous, Continuous, Jamie Pineda MD, Last Rate: 50 mL/hr at 10/31/21 1640, 50 mL/hr at 10/31/21 1640  • "  levoFLOXacin (LEVAQUIN) 750 mg/150 mL D5W (premix) (LEVAQUIN) 750 mg, 750 mg, Intravenous, Q48H, Jamie Pindea MD  •  levothyroxine (SYNTHROID, LEVOTHROID) tablet 75 mcg, 75 mcg, Oral, Q AM, Katherine Camarena APRN, 75 mcg at 11/01/21 0509  •  mesalamine (APRISO) 24 hr capsule 1.5 g, 1.5 g, Oral, Daily, Katherine Camarena APRN, 1.5 g at 11/01/21 0844  •  metoprolol succinate XL (TOPROL-XL) 24 hr tablet 25 mg, 25 mg, Oral, Daily, Katherine Camarena APRN, 25 mg at 11/01/21 0844  •  metroNIDAZOLE (FLAGYL) 500 mg/100mL IVPB, 500 mg, Intravenous, Q8H, Katherine Camarena APRN, 500 mg at 11/01/21 0509  •  ondansetron (ZOFRAN) tablet 4 mg, 4 mg, Oral, Q6H PRN **OR** ondansetron (ZOFRAN) injection 4 mg, 4 mg, Intravenous, Q6H PRN, Katherine Camarena APRN  •  pantoprazole (PROTONIX) 40 mg in 100mL NS IVPB, 8 mg/hr, Intravenous, Continuous, Kendall Bermudez MD, Last Rate: 20 mL/hr at 11/01/21 0732, 8 mg/hr at 11/01/21 0732  •  Pharmacy Consult, , Does not apply, Continuous PRN, Katherine Camarena APRN  •  potassium chloride (MICRO-K) CR capsule 40 mEq, 40 mEq, Oral, Daily, Jamie Pineda MD, 40 mEq at 11/01/21 0843  •  sodium bicarbonate tablet 650 mg, 650 mg, Oral, BID, Katherine Camarena APRN, 650 mg at 11/01/21 0844  •  sodium chloride 0.9 % flush 10 mL, 10 mL, Intravenous, PRN, Kendall Bermudez MD  •  sodium chloride 0.9 % flush 10 mL, 10 mL, Intravenous, Q12H, Katherine Camarena APRN, 10 mL at 11/01/21 0846  •  sodium chloride 0.9 % flush 10 mL, 10 mL, Intravenous, PRN, Katherine Camarena, APRN    Review of Systems     Constitution:  negative for chills, fatigue and fevers  Eyes:  negative for blurriness and change of vision  ENT:   negative for sore throat and voice change  Respiratory: negative for  cough and shortness of air  Cardiovascular:  Negative for chest pain or palpitations  Gastrointestinal:  negative for  See HPI  Genitourinary:  negative for  blood in urine and painful  urination  Integument: negative for  rash and redness  Hematologic / Lymphatic: negative for  excessive bleeding and easy bruising  Musculoskeletal: negative for  joint pain and joint stiffness out of the ordinary  Neurological:  negative for  seizures and speech abnormality  Behavioral/Psych:  negative for  anxiety and depression out of the ordinary  Endocrine: negative for  diabetes and weight loss, unintended  Allergies / Immunologic:  negative for  anaphylaxis and rash        Objective     Vital Signs  Temp:  [98 °F (36.7 °C)-99.5 °F (37.5 °C)] 98.8 °F (37.1 °C)  Heart Rate:  [61-75] 72  Resp:  [18-20] 20  BP: (117-170)/(58-68) 170/65  Body mass index is 20.15 kg/m².    Intake/Output Summary (Last 24 hours) at 11/1/2021 1029  Last data filed at 11/1/2021 0800  Gross per 24 hour   Intake 4743 ml   Output 1925 ml   Net 2818 ml     I/O this shift:  In: -   Out: 400 [Urine:400]       Physical Exam:   General: patient awake, alert and cooperative   Eyes: Normal lids and lashes, no scleral icterus   Neck: supple, normal ROM   Skin: warm and dry, not jaundiced   Cardiovascular: regular rhythm and rate, no murmurs auscultated   Pulm: clear to auscultation bilaterally, regular and unlabored   Abdomen: soft, nontender, nondistended; normal bowel sounds   Rectal: deferred   Extremities: no rash or edema   Psychiatric: Normal mood and behavior; memory intact         Results Review:    I have reviewed all of the patients current test results  Results from last 7 days   Lab Units 11/01/21  0601 10/31/21  0520 10/30/21  2323 10/29/21  1614 10/29/21  0502 10/28/21  1750 10/28/21  1750   WBC 10*3/mm3  --   --   --   --  4.64  --  16.97*   HEMOGLOBIN g/dL 10.1* 8.7* 8.0*   < > 7.5*   < > 8.0*   HEMATOCRIT % 31.5* 26.7* 24.9*   < > 22.6*   < > 25.5*   PLATELETS 10*3/mm3  --   --   --   --  151  --  348    < > = values in this interval not displayed.       Results from last 7 days   Lab Units 11/01/21  0601 10/31/21  0539  10/30/21  0532   SODIUM mmol/L 141 143 144   POTASSIUM mmol/L 3.7 3.1* 3.2*   CHLORIDE mmol/L 108* 111* 111*   CO2 mmol/L 23.0 23.0 24.0   BUN mg/dL 28* 32* 48*   CREATININE mg/dL 2.67* 2.44* 3.09*   CALCIUM mg/dL 8.0* 7.3* 7.7*   BILIRUBIN mg/dL 0.3 0.2 0.3   ALK PHOS U/L 151* 122* 131*   ALT (SGPT) U/L 9 8 8   AST (SGOT) U/L 22 21 21   GLUCOSE mg/dL 100* 94 113*             Lab Results   Lab Value Date/Time    LIPASE 19 09/15/2020 1934       Radiology:    Imaging Results (Last 24 Hours)     ** No results found for the last 24 hours. **            Assessment/Plan     Patient Active Problem List   Diagnosis Code   • Chronic rhinitis J31.0   • Acute renal failure superimposed on stage 4 chronic kidney disease ([facility]) N17.9, N18.4   • Hyponatremia E87.1   • Acute cystitis N30.00   • Metabolic acidosis, increased anion gap E87.2   • Hypomagnesemia E83.42   • Hypokalemia E87.6   • Moderate malnutrition ([facility]) E44.0   • Hypertension, benign I10   • Sepsis ([facility]) A41.9   • Enterocolitis K52.9   • Chronic diarrhea K52.9   • UTI (urinary tract infection), bacterial N39.0, A49.9   • Crohn's disease of large intestine with other complication ([facility]) K50.118   • Asymmetrical hearing loss of left ear [id number]   • Symptomatic anemia D64.9   • CKD (chronic kidney disease) stage 4, GFR 15-29 ml/min ([facility]) N18.4   • Bruit of right carotid artery R09.89   • Wellness examination Z00.00   • Pre-op evaluation Z01.818   • PAD (peripheral artery disease) ([facility]) I73.9   • IHD (ischemic heart disease) I25.9   • Carotid stenosis I65.29   • Stenosis of right carotid artery I65.21   • Preop testing Z01.818   • Carotid stenosis, right I65.21   • Avascular necrosis of bone of hip ([facility]) M87.059   • Diastolic dysfunction I51.89   • Shortness of breath R06.02   • CRD (chronic renal disease), stage IV ([facility]) N18.4   • Thrombocytopenia ([facility]) D69.6   • History of alcoholism ([facility]) F10.21   • Anemia due to chronic kidney disease N18.9, D63.1   • Copper  deficiency E61.0   • Low iron  E61.1   • CLL (chronic lymphocytic leukemia) ([facility]) C91.10   • Pulmonary hypertension ([facility]) I27.20   • GI bleed K92.2   • Hypomagnesemia E83.42   • Hypokalemia E87.6       Impression/Plan    Melena   Anemia, macrocytic  Crohn's disease  Anticoagulated - Plavix, not ordered upon admission    Hemoglobin has improved, no signs of GI bleed.  Patient is reporting constipation.  Will DC PPI drip and initiate PPI IV twice daily.  Further recommendation regarding PPI pending EGD evaluation.  EGD planned for tomorrow with Dr. Bell.  Patient is agreeable to proceed.   I have added miralax do daily medication regimen and encourage continued ambulation.     Electronically signed by STERLING Hendrix, 11/01/21, 10:29 AM CDT.       STERLING Hendrix  11/01/21  10:29 CDT

## 2021-11-01 NOTE — PROGRESS NOTES
[facility]  INPATIENT PROGRESS NOTE    Patient Name: Ricardo Hugo  Date of Admission: 10/28/2021  Today's Date: 11/01/21  Length of Stay: 4  Primary Care Physician: Joe Velasco MD    Subjective   Chief Complaint: f/u camille on ckd    HPI   Patient resting comfortably in bed.  Denies chest pain, shortness of breath, abdominal pain, nausea.  No issues noted overnight.  No signs of bleeding.      Review of Systems   All pertinent negatives and positives are as above. All other systems have been reviewed and are negative unless otherwise stated.     Objective    Temp:  [98.1 °F (36.7 °C)-99.5 °F (37.5 °C)] 98.8 °F (37.1 °C)  Heart Rate:  [61-75] 72  Resp:  [18-20] 20  BP: (145-170)/(58-68) 170/65  Physical Exam  GEN: Awake, alert, interactive, in NAD  HEENT: PERRLA, EOMI, Anicteric, Trachea midline  Lungs: CTAB, no wheezing/rales/rhonchi  Heart: RRR, +S1/s2, no rub  ABD: soft, nt/nd, +BS, no guarding/rebound  Extremities: atraumatic, no cyanosis, no edema  Skin: no rashes or lesions  Neuro: AAOx3, no focal deficits  Psych: normal mood & affect        Results Review:  I have reviewed the labs, radiology results, and diagnostic studies.    Laboratory Data:   Results from last 7 days   Lab Units 11/01/21  0601 10/31/21  0520 10/30/21  2323 10/29/21  1614 10/29/21  0502 10/28/21  1750 10/28/21  1750   WBC 10*3/mm3  --   --   --   --  4.64  --  16.97*   HEMOGLOBIN g/dL 10.1* 8.7* 8.0*   < > 7.5*   < > 8.0*   HEMATOCRIT % 31.5* 26.7* 24.9*   < > 22.6*   < > 25.5*   PLATELETS 10*3/mm3  --   --   --   --  151  --  348    < > = values in this interval not displayed.        Results from last 7 days   Lab Units 11/01/21  0601 10/31/21  0520 10/30/21  0532   SODIUM mmol/L 141 143 144   POTASSIUM mmol/L 3.7 3.1* 3.2*   CHLORIDE mmol/L 108* 111* 111*   CO2 mmol/L 23.0 23.0 24.0   BUN mg/dL 28* 32* 48*   CREATININE mg/dL 2.67* 2.44* 3.09*   CALCIUM mg/dL 8.0* 7.3* 7.7*    BILIRUBIN mg/dL 0.3 0.2 0.3   ALK PHOS U/L 151* 122* 131*   ALT (SGPT) U/L 9 8 8   AST (SGOT) U/L 22 21 21   GLUCOSE mg/dL 100* 94 113*       Culture Data:   No results found for: BLOODCX, URINECX, WOUNDCX, MRSACX, RESPCX, STOOLCX    Radiology Data:   Imaging Results (Last 24 Hours)     ** No results found for the last 24 hours. **          I have reviewed the patient's current medications.     Assessment/Plan     Active Hospital Problems    Diagnosis    • Hypomagnesemia    • Hypokalemia    • GI bleed    • CLL (chronic lymphocytic leukemia) (HCC)    • Symptomatic anemia    • Chronic diarrhea    • Sepsis (HCC)    • Metabolic acidosis, increased anion gap    • Acute renal failure superimposed on stage 4 chronic kidney disease (HCC)        #1 AMAYA on CKD -appears to be closed and on baseline if not at baseline.  Overall improved.  Currently still getting some IVF with plans to be n.p.o. after midnight for EGD tomorrow.  Nephro following.  Appreciate input.    #2 GI bleed/anemia -no signs of ongoing bleeding.  H&H appears to have stabilized.  Plan for EGD tomorrow.  On PPI twice daily.    #3 essential hypertension -BP stable on meds    #4 hypokalemia -improved    #5 CLL -follows with hematology as outpatient    Discharge Planning: Ongoing.  Plan for EGD tomorrow.  Potential discharge home in the next 24 to 48 hours pending results.    Electronically signed by Dc Issa DO, 11/01/21, 13:12 CDT.

## 2021-11-02 ENCOUNTER — ANESTHESIA (OUTPATIENT)
Dept: GASTROENTEROLOGY | Facility: HOSPITAL | Age: 71
End: 2021-11-02

## 2021-11-02 ENCOUNTER — APPOINTMENT (OUTPATIENT)
Dept: ONCOLOGY | Facility: HOSPITAL | Age: 71
End: 2021-11-02

## 2021-11-02 ENCOUNTER — ANESTHESIA EVENT (OUTPATIENT)
Dept: GASTROENTEROLOGY | Facility: HOSPITAL | Age: 71
End: 2021-11-02

## 2021-11-02 ENCOUNTER — APPOINTMENT (OUTPATIENT)
Dept: LAB | Facility: HOSPITAL | Age: 71
End: 2021-11-02

## 2021-11-02 LAB
ALBUMIN SERPL-MCNC: 3 G/DL (ref 3.5–5.2)
ALBUMIN/GLOB SERPL: 1.3 G/DL
ALP SERPL-CCNC: 130 U/L (ref 39–117)
ALT SERPL W P-5'-P-CCNC: 9 U/L (ref 1–41)
ANION GAP SERPL CALCULATED.3IONS-SCNC: 10 MMOL/L (ref 5–15)
AST SERPL-CCNC: 20 U/L (ref 1–40)
BILIRUB SERPL-MCNC: 0.2 MG/DL (ref 0–1.2)
BUN SERPL-MCNC: 27 MG/DL (ref 8–23)
BUN/CREAT SERPL: 11 (ref 7–25)
CALCIUM SPEC-SCNC: 8.1 MG/DL (ref 8.6–10.5)
CHLORIDE SERPL-SCNC: 108 MMOL/L (ref 98–107)
CO2 SERPL-SCNC: 24 MMOL/L (ref 22–29)
CREAT SERPL-MCNC: 2.46 MG/DL (ref 0.76–1.27)
GFR SERPL CREATININE-BSD FRML MDRD: 26 ML/MIN/1.73
GLOBULIN UR ELPH-MCNC: 2.3 GM/DL
GLUCOSE SERPL-MCNC: 96 MG/DL (ref 65–99)
HCT VFR BLD AUTO: 26.2 % (ref 37.5–51)
HGB BLD-MCNC: 8.6 G/DL (ref 13–17.7)
MAGNESIUM SERPL-MCNC: 1.6 MG/DL (ref 1.6–2.4)
POTASSIUM SERPL-SCNC: 3.5 MMOL/L (ref 3.5–5.2)
PROT SERPL-MCNC: 5.3 G/DL (ref 6–8.5)
SODIUM SERPL-SCNC: 142 MMOL/L (ref 136–145)

## 2021-11-02 PROCEDURE — 25010000002 PROPOFOL 10 MG/ML EMULSION: Performed by: NURSE ANESTHETIST, CERTIFIED REGISTERED

## 2021-11-02 PROCEDURE — 85018 HEMOGLOBIN: CPT | Performed by: INTERNAL MEDICINE

## 2021-11-02 PROCEDURE — 83735 ASSAY OF MAGNESIUM: CPT | Performed by: NURSE PRACTITIONER

## 2021-11-02 PROCEDURE — 99232 SBSQ HOSP IP/OBS MODERATE 35: CPT | Performed by: INTERNAL MEDICINE

## 2021-11-02 PROCEDURE — 85014 HEMATOCRIT: CPT | Performed by: INTERNAL MEDICINE

## 2021-11-02 PROCEDURE — 43255 EGD CONTROL BLEEDING ANY: CPT | Performed by: INTERNAL MEDICINE

## 2021-11-02 PROCEDURE — 0W3P8ZZ CONTROL BLEEDING IN GASTROINTESTINAL TRACT, VIA NATURAL OR ARTIFICIAL OPENING ENDOSCOPIC: ICD-10-PCS | Performed by: INTERNAL MEDICINE

## 2021-11-02 PROCEDURE — 80053 COMPREHEN METABOLIC PANEL: CPT | Performed by: NURSE PRACTITIONER

## 2021-11-02 RX ORDER — PROPOFOL 10 MG/ML
VIAL (ML) INTRAVENOUS AS NEEDED
Status: DISCONTINUED | OUTPATIENT
Start: 2021-11-02 | End: 2021-11-02 | Stop reason: SURG

## 2021-11-02 RX ORDER — LIDOCAINE HYDROCHLORIDE 20 MG/ML
INJECTION, SOLUTION EPIDURAL; INFILTRATION; INTRACAUDAL; PERINEURAL AS NEEDED
Status: DISCONTINUED | OUTPATIENT
Start: 2021-11-02 | End: 2021-11-02 | Stop reason: SURG

## 2021-11-02 RX ORDER — PANTOPRAZOLE SODIUM 40 MG/1
40 TABLET, DELAYED RELEASE ORAL
Status: DISCONTINUED | OUTPATIENT
Start: 2021-11-02 | End: 2021-11-03 | Stop reason: HOSPADM

## 2021-11-02 RX ORDER — MIDAZOLAM HYDROCHLORIDE 1 MG/ML
0.5 INJECTION INTRAMUSCULAR; INTRAVENOUS
Status: DISCONTINUED | OUTPATIENT
Start: 2021-11-02 | End: 2021-11-02 | Stop reason: HOSPADM

## 2021-11-02 RX ORDER — SODIUM CHLORIDE 9 MG/ML
100 INJECTION, SOLUTION INTRAVENOUS CONTINUOUS
Status: DISCONTINUED | OUTPATIENT
Start: 2021-11-02 | End: 2021-11-03

## 2021-11-02 RX ORDER — MAGNESIUM SULFATE 1 G/100ML
1 INJECTION INTRAVENOUS ONCE
Status: DISCONTINUED | OUTPATIENT
Start: 2021-11-02 | End: 2021-11-02

## 2021-11-02 RX ORDER — SODIUM CHLORIDE 0.9 % (FLUSH) 0.9 %
10 SYRINGE (ML) INJECTION AS NEEDED
Status: DISCONTINUED | OUTPATIENT
Start: 2021-11-02 | End: 2021-11-02 | Stop reason: HOSPADM

## 2021-11-02 RX ORDER — CLONIDINE HYDROCHLORIDE 0.2 MG/1
0.2 TABLET ORAL
Status: DISCONTINUED | OUTPATIENT
Start: 2021-11-02 | End: 2021-11-03 | Stop reason: HOSPADM

## 2021-11-02 RX ORDER — SODIUM CHLORIDE 0.9 % (FLUSH) 0.9 %
10 SYRINGE (ML) INJECTION EVERY 12 HOURS SCHEDULED
Status: DISCONTINUED | OUTPATIENT
Start: 2021-11-02 | End: 2021-11-02 | Stop reason: HOSPADM

## 2021-11-02 RX ADMIN — Medication 10 ML: at 09:49

## 2021-11-02 RX ADMIN — POLYETHYLENE GLYCOL 3350 17 G: 17 POWDER, FOR SOLUTION ORAL at 09:06

## 2021-11-02 RX ADMIN — LEVOTHYROXINE SODIUM 75 MCG: 75 TABLET ORAL at 05:06

## 2021-11-02 RX ADMIN — DIPHENHYDRAMINE HYDROCHLORIDE AND LIDOCAINE HYDROCHLORIDE AND ALUMINUM HYDROXIDE AND MAGNESIUM HYDRO 5 ML: KIT at 09:10

## 2021-11-02 RX ADMIN — PROPOFOL 100 MG: 10 INJECTION, EMULSION INTRAVENOUS at 12:40

## 2021-11-02 RX ADMIN — SODIUM BICARBONATE 325 MG: 650 TABLET ORAL at 09:09

## 2021-11-02 RX ADMIN — POTASSIUM CHLORIDE 40 MEQ: 10 CAPSULE, COATED, EXTENDED RELEASE ORAL at 09:07

## 2021-11-02 RX ADMIN — METRONIDAZOLE 500 MG: 500 INJECTION, SOLUTION INTRAVENOUS at 14:55

## 2021-11-02 RX ADMIN — CLONIDINE HYDROCHLORIDE 0.2 MG: 0.2 TABLET ORAL at 06:19

## 2021-11-02 RX ADMIN — MAGNESIUM GLUCONATE 500 MG ORAL TABLET 800 MG: 500 TABLET ORAL at 16:52

## 2021-11-02 RX ADMIN — SODIUM BICARBONATE 325 MG: 650 TABLET ORAL at 20:10

## 2021-11-02 RX ADMIN — MESALAMINE 1.5 G: 375 CAPSULE, EXTENDED RELEASE ORAL at 09:08

## 2021-11-02 RX ADMIN — METRONIDAZOLE 500 MG: 500 INJECTION, SOLUTION INTRAVENOUS at 05:06

## 2021-11-02 RX ADMIN — AZELASTINE HYDROCHLORIDE 1 SPRAY: 137 SPRAY, METERED NASAL at 08:15

## 2021-11-02 RX ADMIN — PANTOPRAZOLE SODIUM 40 MG: 40 TABLET, DELAYED RELEASE ORAL at 17:00

## 2021-11-02 RX ADMIN — SODIUM CHLORIDE 100 ML/HR: 9 INJECTION, SOLUTION INTRAVENOUS at 12:26

## 2021-11-02 RX ADMIN — Medication 10 ML: at 20:10

## 2021-11-02 RX ADMIN — AMLODIPINE BESYLATE 10 MG: 10 TABLET ORAL at 09:06

## 2021-11-02 RX ADMIN — METOPROLOL SUCCINATE 25 MG: 25 TABLET, EXTENDED RELEASE ORAL at 09:08

## 2021-11-02 RX ADMIN — PROPOFOL 50 MG: 10 INJECTION, EMULSION INTRAVENOUS at 12:48

## 2021-11-02 RX ADMIN — CLONIDINE HYDROCHLORIDE 0.2 MG: 0.2 TABLET ORAL at 14:55

## 2021-11-02 RX ADMIN — AZELASTINE HYDROCHLORIDE 1 SPRAY: 137 SPRAY, METERED NASAL at 20:11

## 2021-11-02 RX ADMIN — LIDOCAINE HYDROCHLORIDE 100 MG: 20 INJECTION, SOLUTION EPIDURAL; INFILTRATION; INTRACAUDAL; PERINEURAL at 12:40

## 2021-11-02 RX ADMIN — CLONIDINE HYDROCHLORIDE 0.2 MG: 0.2 TABLET ORAL at 17:50

## 2021-11-02 RX ADMIN — PROPOFOL 50 MG: 10 INJECTION, EMULSION INTRAVENOUS at 12:57

## 2021-11-02 RX ADMIN — PANTOPRAZOLE SODIUM 40 MG: 40 INJECTION, POWDER, FOR SOLUTION INTRAVENOUS at 09:49

## 2021-11-02 NOTE — ANESTHESIA PREPROCEDURE EVALUATION
Anesthesia Evaluation     Patient summary reviewed   no history of anesthetic complications:  NPO Solid Status: > 8 hours             Airway   Mallampati: II  TM distance: >3 FB  Neck ROM: full  Dental    (+) upper dentures and lower dentures    Pulmonary    (-) COPD, asthma, sleep apnea, not a smoker  Cardiovascular   Exercise tolerance: good (4-7 METS)    ECG reviewed    (+) hypertension, CAD, CABG, PVD, hyperlipidemia,  carotid artery disease  (-) pacemaker, past MI, angina, cardiac stents      Neuro/Psych  (-) seizures, TIA, CVA  GI/Hepatic/Renal/Endo    (+)  GI bleeding , renal disease CRI, thyroid problem hypothyroidism  (-) GERD, liver disease, diabetes    Musculoskeletal     Abdominal    Substance History      OB/GYN          Other   blood dyscrasia anemia,                       Anesthesia Plan    ASA 3     MAC     intravenous induction     Anesthetic plan, all risks, benefits, and alternatives have been provided, discussed and informed consent has been obtained with: patient.  Use of blood products discussed with patient  Consented to blood products.

## 2021-11-02 NOTE — INTERVAL H&P NOTE
H&P reviewed. The patient was examined and there are no changes to the H&P.      The risks, benefits, and alternatives of the proposed course of action are reviewed in detail and an opportunity for questions is provided.  All questions are answered to apparent satisfaction.  Desire to proceed as outlined is expressed.        Thank you for allowing us to participate in the care of this patient.    Sincerely,    MAHENDRA Bell MD, [facility]  [facility]

## 2021-11-02 NOTE — PROGRESS NOTES
Nephrology ([facility]) Progress Note      Patient:  Ricardo Hugo  YOB: 1950  Date of Service: 11/2/2021  MRN: [id number]   Acct: [id number]   Primary Care Physician: Joe Velasco MD  Advance Directive:   Code Status and Medical Interventions:   Ordered at: 10/28/21 1839     Code Status (Patient has no pulse and is not breathing):    CPR (Attempt to Resuscitate)     Medical Interventions (Patient has pulse or is breathing):    Full     Admit Date: 10/28/2021       Hospital Day: 5  Referring Provider: Kendall Bermudez MD      Patient personally seen and examined.  Complete chart including Consults, Notes, Operative Reports, Labs, Cardiology, and Radiology studies reviewed as able.        Subjective:  Ricardo Hugo is a 71 y.o. male for whom we were consulted for evaluation and treatment of acute kidney injury. Baseline chronic kidney disease stage 4 with baseline creatinine approximately 2.5. Follows with STERLING Freeman in our office. History of multiple prior AMAYA that are usually due to volume depletion from chronic nausea and diarrhea, which in turn is secondary to Crohn's disease. Other history includes hypertension, CLL, prior ETOH abuse, and CAD with prior CABG.  Patient presented to ER with complaint of over a week of nausea/vomiting, poor PO intake, black watery diarrhea. Also complained of severe mouth/tongue pain. Had taken Aleve a few times this week to try and alleviate the pain. Urine output had been minimal for 24 hours prior to arrival at hospital. Initial labs in ER revealed creatinine 4.15, BUN 71, CO2 9.0, Hgb 8.0.  Started on IV fluids and given 1 unit PRBC.  renal function has improved    Today feeling better, no new overnight issues. Urine output nonoliguric. He is s/p EGD.     Allergies:  Lortab [hydrocodone-acetaminophen] and Allopurinol    Home Meds:  Medications Prior to Admission   Medication Sig Dispense Refill Last Dose   •  albuterol sulfate  (90 Base) MCG/ACT inhaler USE 2 INHALATIONS FOUR TIMES A DAY AS NEEDED 20.1 g 3 Past Month at Unknown time   • ALPRAZolam (Xanax) 0.25 MG tablet Take 1 tablet by mouth At Night As Needed for Anxiety. 30 tablet 3 10/27/2021 at Unknown time   • aspirin (ASPIR) 81 MG EC tablet Take 81 mg by mouth Daily.   10/28/2021 at Unknown time   • azelastine (ASTELIN) 0.1 % nasal spray 1 spray into the nostril(s) as directed by provider 2 (Two) Times a Day. Use in each nostril as directed 90 mL 3 Past Month at Unknown time   • fluticasone (FLONASE) 50 MCG/ACT nasal spray 2 sprays into the nostril(s) as directed by provider Daily As Needed for Rhinitis.   Past Week at Unknown time   • vitamin D (ERGOCALCIFEROL) 1.25 MG (73780 UT) capsule capsule Take 1 capsule by mouth 1 (One) Time Per Week. 15 capsule 3 Past Week at Unknown time   • amLODIPine (NORVASC) 10 MG tablet TAKE 1 TABLET DAILY 90 tablet 3 10/28/2021   • atorvastatin (LIPITOR) 10 MG tablet Take 1 tablet by mouth Daily. 90 tablet 3 10/28/2021   • cholestyramine (QUESTRAN) 4 g packet TAKE 1 PACKET BY MOUTH DAILY 30 each 5 10/28/2021   • cloNIDine (CATAPRES) 0.2 MG tablet TAKE 3 TABLETS DAILY 270 tablet 3 10/28/2021   • clopidogrel (Plavix) 75 MG tablet Take 1 tablet by mouth Daily. 90 tablet 3 10/28/2021   • Copper Gluconate 2 MG tablet Take 2 mg by mouth Daily. 30 tablet 6 10/28/2021   • diphenhydrAMINE (Benadryl Allergy) 25 mg capsule Take 25 mg by mouth Every 6 (Six) Hours As Needed for Itching.   10/27/2021   • fexofenadine (ALLEGRA) 180 MG tablet Take 180 mg by mouth Daily.   10/28/2021   • furosemide (Lasix) 20 MG tablet Take 1 tablet by mouth Daily. 90 tablet 3 10/28/2021   • hydrALAZINE (APRESOLINE) 25 MG tablet Take 1 tablet by mouth 3 (Three) Times a Day. 90 tablet 5 10/28/2021   • levothyroxine (Synthroid) 75 MCG tablet Take 1 tablet by mouth Daily. 90 tablet 3 10/28/2021   • magnesium chloride ER 64 MG DR tablet Take 143 mg by mouth  Daily.   10/28/2021   • Melatonin 10 MG capsule Take 2 capsules by mouth Every Night.   10/28/2021   • metoprolol succinate XL (TOPROL-XL) 25 MG 24 hr tablet Take 1 tablet by mouth Daily. 90 tablet 3 10/28/2021   • Multiple Vitamins-Minerals (PRESERVISION/LUTEIN) capsule Take 1 capsule by mouth 2 (two) times a day.   10/28/2021   • omeprazole (priLOSEC) 20 MG capsule Take 1 capsule by mouth Daily. 90 capsule 3 10/28/2021   • potassium chloride 10 MEQ CR tablet Take 1 tablet by mouth 2 (Two) Times a Day. 180 tablet 3 10/28/2021   • sodium bicarbonate 650 MG tablet Take 1 tablet by mouth 2 (Two) Times a Day. (Patient taking differently: Take 650 mg by mouth 3 (Three) Times a Day.) 180 tablet 3 10/28/2021   • valsartan (DIOVAN) 160 MG tablet Take 160 mg by mouth Daily.   10/28/2021       Medicines:  Current Facility-Administered Medications   Medication Dose Route Frequency Provider Last Rate Last Admin   • acetaminophen (TYLENOL) tablet 650 mg  650 mg Oral Q4H PRN Bridger Bell MD        Or   • acetaminophen (TYLENOL) 160 MG/5ML solution 650 mg  650 mg Oral Q4H PRN Bridger Bell MD        Or   • acetaminophen (TYLENOL) suppository 650 mg  650 mg Rectal Q4H PRN Bridger Bell MD       • ALPRAZolam (XANAX) tablet 0.25 mg  0.25 mg Oral Nightly PRN Bridger Bell MD       • amLODIPine (NORVASC) tablet 10 mg  10 mg Oral Daily Bridger Bell MD   10 mg at 11/02/21 0906   • azelastine (ASTELIN) nasal spray 1 spray  1 spray Each Nare BID Bridger Bell MD   1 spray at 11/02/21 0815   • cloNIDine (CATAPRES) tablet 0.2 mg  0.2 mg Oral TID With Meals Bridger Bell MD   0.2 mg at 11/02/21 1455   • diphenhydrAMINE (BENADRYL) capsule 25 mg  25 mg Oral Q6H PRN Bridger Bell MD       • First Mouthwash (Magic Mouthwash) 5 mL  5 mL Swish & Spit Q4H Bridger Bell MD   5 mL at 11/02/21 0910   • HYDROmorphone (DILAUDID) injection 1 mg  1 mg Intravenous Q3H PRN Bridger Bell MD       •  ipratropium-albuterol (DUO-NEB) nebulizer solution 3 mL  3 mL Nebulization Q6H PRN Bridger Bell MD       • labetalol (NORMODYNE,TRANDATE) injection 20 mg  20 mg Intravenous Once Bridger Bell MD       • lactated ringers infusion  50 mL/hr Intravenous Continuous Bridger Bell MD 50 mL/hr at 11/01/21 1821 50 mL/hr at 11/01/21 1821   • levothyroxine (SYNTHROID, LEVOTHROID) tablet 75 mcg  75 mcg Oral Q AM Bridger Bell MD   75 mcg at 11/02/21 0506   • magnesium sulfate in D5W 1g/100mL (PREMIX)  1 g Intravenous Once Dc Issa DO       • mesalamine (APRISO) 24 hr capsule 1.5 g  1.5 g Oral Daily Bridger Bell MD   1.5 g at 11/02/21 0908   • metoprolol succinate XL (TOPROL-XL) 24 hr tablet 25 mg  25 mg Oral Daily Bridger Bell MD   25 mg at 11/02/21 0908   • ondansetron (ZOFRAN) tablet 4 mg  4 mg Oral Q6H PRN Bridger Bell MD        Or   • ondansetron (ZOFRAN) injection 4 mg  4 mg Intravenous Q6H PRN Bridger Bell MD       • pantoprazole (PROTONIX) injection 40 mg  40 mg Intravenous BID AC Bridger Bell MD   40 mg at 11/02/21 0949   • Pharmacy Consult   Does not apply Continuous PRN Bridger Bell MD       • polyethylene glycol (MIRALAX) packet 17 g  17 g Oral Daily Bridger Bell MD   17 g at 11/02/21 0906   • potassium chloride (MICRO-K) CR capsule 40 mEq  40 mEq Oral Daily Bridger Bell MD   40 mEq at 11/02/21 0907   • sodium bicarbonate tablet 325 mg  325 mg Oral BID Bridger Bell MD   325 mg at 11/02/21 0909   • sodium chloride 0.9 % flush 10 mL  10 mL Intravenous PRN Bridger Bell MD       • sodium chloride 0.9 % flush 10 mL  10 mL Intravenous Q12H Bridger Bell MD   10 mL at 11/02/21 0949   • sodium chloride 0.9 % flush 10 mL  10 mL Intravenous PRN Bridger Bell MD       • sodium chloride 0.9 % infusion  100 mL/hr Intravenous Continuous Bridger Bell  mL/hr at 11/02/21 1235 Currently Infusing at 11/02/21 1235       Past  Medical History:  Past Medical History:   Diagnosis Date   • 3-vessel CAD 8/11/2020   • Allergic rhinitis    • Anxiety disorder 4/27/2020   • Arthritis    • Asymmetrical sensorineural hearing loss 6/28/2017   • Atherosclerosis of native artery of both lower extremities with intermittent claudication ([facility]) 7/18/2019   • Avascular necrosis of femoral head, left ([facility]) 07/11/2020    right hip after surgery   • Carotid stenosis    • Chronic mucoid otitis media    • Chronic rhinitis    • Coronary artery disease     HEART BYPASS 2004   • Crohn's disease of large intestine with other complication ([facility]) 7/30/2020    Chronic diarrhea Colonoscopy July 2020 revealed mild patchy scattered hemosiderin staining with inflammation more so in rectosigmoid area.  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   • Iron deficiency anemia 7/14/2020   • Mixed hearing loss of left ear    • PAD (peripheral artery disease) ([facility]) 8/11/2020   • Perianal abscess    • Pernicious anemia 08/17/2020    took shots but never diagnosed with b12 deficiency   • Personal history of alcoholism ([facility]) 08/11/2020    quit drinking in 2013   • Prostatic hypertrophy 8/11/2020   • Sensorineural hearing loss    • Sepsis with acute renal failure ([facility]) 9/15/2020   • Shortness of breath 5/27/2021   • Tinnitus    • Ventricular tachycardia, nonsustained ([facility]) 7/14/2020   • Weight loss 7/11/2020       Past Surgical History:  Past Surgical History:   Procedure Laterality Date   • ARTERY SURGERY  2021    right side on neck   • CAROTID ENDARTERECTOMY Right 5/10/2021    Procedure: RIGHT CAROTID ENDARTERECTOMY WITH EEG;  Surgeon: Miriam  Gil FUCHS DO;  Location: [facility] HYBRID OR 12;  Service: Vascular;  Laterality: Right;   • COLONOSCOPY N/A 2020    Procedure: COLONOSCOPY WITH ANESTHESIA;  Surgeon: Adrien Brewster MD;  Location: [facility] ENDOSCOPY;  Service: Gastroenterology;  Laterality: N/A;  pre op: diarrhea  post op: polyps  PCP: Joe Velasco MD   • COLONOSCOPY N/A 10/13/2020    Procedure: COLONOSCOPY WITH ANESTHESIA;  Surgeon: Adrien Brewster MD;  Location: [facility] ENDOSCOPY;  Service: Gastroenterology;  Laterality: N/A;  Pre: Chronic Diarrhea, Crohn's  Post: AVM  Dr. Neftali Velasco  CO2 Inflation Used   • CORONARY ARTERY BYPASS GRAFT  2003    x3   • EYE SURGERY Bilateral     catorac   • INCISION AND DRAINAGE PERIRECTAL ABSCESS N/A 3/3/2017    Procedure: INCISION AND DRAINAGE OF JEET ANAL ABSCESS;  Surgeon: Lynette Smith MD;  Location: [facility] OR;  Service:    • MYRINGOTOMY W/ TUBES Left 2017    06/10/2016   • TONSILLECTOMY     • TOTAL HIP ARTHROPLASTY Right 2006       Family History  Family History   Problem Relation Age of Onset   • No Known Problems Mother    • No Known Problems Father    • No Known Problems Daughter    • Colon cancer Neg Hx    • Colon polyps Neg Hx        Social History  Social History     Socioeconomic History   • Marital status:    Tobacco Use   • Smoking status: Former Smoker     Packs/day: 0.50     Years: 25.00     Pack years: 12.50     Types: Cigarettes     Start date:      Quit date: 10/13/2013     Years since quittin.0   • Smokeless tobacco: Never Used   • Tobacco comment: quit    Vaping Use   • Vaping Use: Never used   Substance and Sexual Activity   • Alcohol use: Not Currently   • Drug use: No   • Sexual activity: Defer         Review of Systems:  History obtained from chart review and the patient  General ROS: No fever or chills  Respiratory ROS: No cough, shortness of breath, wheezing  Cardiovascular ROS: No chest pain or palpitations  Gastrointestinal ROS: No abdominal pain  or melena  Genito-Urinary ROS: No dysuria or hematuria  Psych ROS: No anxiety and depression    Objective:  Patient Vitals for the past 24 hrs:   BP Temp Temp src Pulse Resp SpO2 Weight   11/02/21 1510 (!) 185/73 98.4 °F (36.9 °C) Oral 74 16 98 % --   11/02/21 1325 142/57 -- -- 61 17 95 % --   11/02/21 1320 -- -- -- -- 18 -- --   11/02/21 1315 102/55 -- -- 60 15 95 % --   11/02/21 1310 -- -- -- -- 14 -- --   11/02/21 1305 107/50 -- -- 65 13 95 % --   11/02/21 1302 105/50 -- -- 65 16 95 % --   11/02/21 0906 164/49 -- -- 64 -- -- --   11/02/21 0700 151/61 98.2 °F (36.8 °C) Oral 65 16 95 % --   11/02/21 0500 180/80 -- -- -- -- -- --   11/02/21 0431 (!) 189/64 98.1 °F (36.7 °C) Oral 71 18 96 % 66.7 kg (147 lb)   11/01/21 2336 149/59 97.9 °F (36.6 °C) Axillary 61 16 96 % --   11/01/21 2000 160/59 98.5 °F (36.9 °C) Oral 65 18 98 % --       Intake/Output Summary (Last 24 hours) at 11/2/2021 1513  Last data filed at 11/2/2021 0600  Gross per 24 hour   Intake 2060 ml   Output 1050 ml   Net 1010 ml     General: awake/alert   Chest:  clear to auscultation bilaterally without respiratory distress  CVS: regular rate and rhythm  Abdominal: soft, nontender, positive bowel sounds  Extremities: no cyanosis or edema  Skin: warm and dry without rash      Labs:  Results from last 7 days   Lab Units 11/02/21  0600 11/01/21  0601 10/31/21  0520 10/29/21  1614 10/29/21  0502 10/28/21  1750 10/28/21  1750   WBC 10*3/mm3  --   --   --   --  4.64  --  16.97*   HEMOGLOBIN g/dL 8.6* 10.1* 8.7*   < > 7.5*   < > 8.0*   HEMATOCRIT % 26.2* 31.5* 26.7*   < > 22.6*   < > 25.5*   PLATELETS 10*3/mm3  --   --   --   --  151  --  348    < > = values in this interval not displayed.         Results from last 7 days   Lab Units 11/02/21  0600 11/01/21  0601 10/31/21  0520   SODIUM mmol/L 142 141 143   POTASSIUM mmol/L 3.5 3.7 3.1*   CHLORIDE mmol/L 108* 108* 111*   CO2 mmol/L 24.0 23.0 23.0   BUN mg/dL 27* 28* 32*   CREATININE mg/dL 2.46* 2.67* 2.44*    CALCIUM mg/dL 8.1* 8.0* 7.3*   BILIRUBIN mg/dL 0.2 0.3 0.2   ALK PHOS U/L 130* 151* 122*   ALT (SGPT) U/L 9 9 8   AST (SGOT) U/L 20 22 21   GLUCOSE mg/dL 96 100* 94       Radiology:   Imaging Results (Last 72 Hours)     ** No results found for the last 72 hours. **          Culture:  No results found for: BLOODCX, URINECX, WOUNDCX, MRSACX, RESPCX, STOOLCX      Assessment   1.  Acute kidney injury, prerenal--resolved  2.  Baseline chronic kidney disease stage 4  3.  Essential hypertension  4.  Crohn's disease  5.  Chronic lymphocytic leukemia  6.  Metabolic acidosis  7.  Anemia of CKD, acute blood loss    Plan:  1.  May hydrate by mouth after he lost his IV line  2.  Follow up labs.       Bolivar Rueda MD  11/2/2021  15:13 CDT

## 2021-11-02 NOTE — PLAN OF CARE
Problem: Adult Inpatient Plan of Care  Goal: Plan of Care Review  11/2/2021 1657 by Barbara Rodriguez, RN  Outcome: Ongoing, Progressing  Flowsheets (Taken 11/2/2021 1657)  Progress: no change  Plan of Care Reviewed With: patient  Outcome Summary: Pt went for EGD this shift. tolerating liquids at this time. Pt no c/o pain noted. Pt reports 2 BMs this shift formed and brown incolor. No N/V. Pt IV occluded after EGD and pt did not wan restarted. meds switched to PO  11/2/2021 1656 by Barbara Rodriguez, RN  Outcome: Ongoing, Progressing   Goal Outcome Evaluation:  Plan of Care Reviewed With: patient        Progress: no change  Outcome Summary: Pt went for EGD this shift. tolerating liquids at this time. Pt no c/o pain noted. Pt reports 2 BMs this shift formed and brown incolor. No N/V. Pt IV occluded after EGD and pt did not wan restarted. meds switched to PO

## 2021-11-02 NOTE — PROGRESS NOTES
[facility] Medicine Services  INPATIENT PROGRESS NOTE    Patient Name: Ricardo Hugo  Date of Admission: 10/28/2021  Today's Date: 11/02/21  Length of Stay: 5  Primary Care Physician: Joe Velasco MD    Subjective   Chief Complaint: f/u camille on ckd    HPI   Patient seen post EGD.  Looks well and feels well.  Denies chest pain or nausea.  No shortness of breath or dizziness.      Review of Systems   All pertinent negatives and positives are as above. All other systems have been reviewed and are negative unless otherwise stated.     Objective    Temp:  [97.9 °F (36.6 °C)-98.5 °F (36.9 °C)] 98.2 °F (36.8 °C)  Heart Rate:  [61-71] 64  Resp:  [16-20] 16  BP: (141-189)/(49-80) 164/49  Physical Exam  GEN: Awake, alert, interactive, in NAD  HEENT: PERRLA, EOMI, Anicteric, Trachea midline  Lungs: CTAB, no wheezing/rales/rhonchi  Heart: RRR, +S1/s2, no rub  ABD: soft, nt/nd, +BS, no guarding/rebound  Extremities: atraumatic, no cyanosis, no edema  Skin: no rashes or lesions  Neuro: AAOx3, no focal deficits  Psych: normal mood & affect        Results Review:  I have reviewed the labs, radiology results, and diagnostic studies.    Laboratory Data:   Results from last 7 days   Lab Units 11/02/21  0600 11/01/21  0601 10/31/21  0520 10/29/21  1614 10/29/21  0502 10/28/21  1750 10/28/21  1750   WBC 10*3/mm3  --   --   --   --  4.64  --  16.97*   HEMOGLOBIN g/dL 8.6* 10.1* 8.7*   < > 7.5*   < > 8.0*   HEMATOCRIT % 26.2* 31.5* 26.7*   < > 22.6*   < > 25.5*   PLATELETS 10*3/mm3  --   --   --   --  151  --  348    < > = values in this interval not displayed.        Results from last 7 days   Lab Units 11/02/21  0600 11/01/21  0601 10/31/21  0520   SODIUM mmol/L 142 141 143   POTASSIUM mmol/L 3.5 3.7 3.1*   CHLORIDE mmol/L 108* 108* 111*   CO2 mmol/L 24.0 23.0 23.0   BUN mg/dL 27* 28* 32*   CREATININE mg/dL 2.46* 2.67* 2.44*   CALCIUM mg/dL 8.1* 8.0* 7.3*   BILIRUBIN mg/dL 0.2 0.3 0.2   ALK  PHOS U/L 130* 151* 122*   ALT (SGPT) U/L 9 9 8   AST (SGOT) U/L 20 22 21   GLUCOSE mg/dL 96 100* 94       Culture Data:   No results found for: BLOODCX, URINECX, WOUNDCX, MRSACX, RESPCX, STOOLCX    Radiology Data:   Imaging Results (Last 24 Hours)     ** No results found for the last 24 hours. **          I have reviewed the patient's current medications.     Assessment/Plan     Active Hospital Problems    Diagnosis    • Hypomagnesemia    • Hypokalemia    • GI bleed    • CLL (chronic lymphocytic leukemia) (HCC)    • Symptomatic anemia    • Chronic diarrhea    • Sepsis (HCC)    • Metabolic acidosis, increased anion gap    • Acute renal failure superimposed on stage 4 chronic kidney disease (HCC)        #1 AMAYA on CKD -appears to be closed and on baseline if not at baseline.  Overall improved.  Fluids per nephro.  Can likely stop soon.    #2 GI bleed/anemia -no signs of ongoing bleeding.  H&H appears to have stabilized.  Has post EGD today with multiple findings of erosion and cauterization of 2 angioectasia's.  Continue on PPI twice daily.  Restart clear liquid diet.    #3 essential hypertension -BP stable on meds    #4 hypokalemia -improved    #5 CLL -follows with hematology as outpatient    Discharge Planning: Ongoing.  Monitor overnight post EGD unclear liquid diet with plans advance and discharge tomorrow if no adverse events.    Electronically signed by Dc Issa DO, 11/02/21, 11:42 CDT.

## 2021-11-02 NOTE — PROGRESS NOTES
[facility] Oncology/Hematology Services  FU NOTE    PATIENT NAME:  Ricardo Hugo  YOB: 1950  PATIENT MRN:  [id number]    Date of Admission:  10/28/2021  FU Date:  11/2/2021  Referring Provider: Kendall Bermudez, *    Subjective     Admitted through ER on 10.28.2: Presented to the ER at [facility] with odynophagia that started 2 days prior to arrival with a pain of 7/10 with nausea and vomiting stating that “he could not keep anything down”. He also stated that he had “black watery diarrhea for the past week”. He was started on a protonic drip and given 1 unit of PRBC in the ER for symptomatic anemia. He was also noted to have an elevation in the WBC with bands. He reported having a swollen tongue and in the ER it appeared to indeed be oedematous. He was admitted for GI bleed. Patient was seen on 9/21/2021 by Dr. Brewster from gastroenterology for his Crohn's disease.  At that time he stated that he has chronic diarrhea but it was currently resolved.  It was noted the patient does have a history of colonic AVMs and is on Plavix as well as taking Aleve intermittently.  It was determined that the patient has Crohn's but with minimal inflammation at most and chronic diarrhea is resolved.  As there were no evidence of active Crohn's at the time, recommendations were to continue him on Apriso (mesalamine).  The AVMs and his chronic anticoagulation were thought to contribute to his chronic blood loss anemia and he did not wish to pursue an EGD at that time but was recommended to stop his Aleve p.m. for sleep purposes as may increase bleeding.  He was to follow-up with GI in 1 year.     He stopped making urine 24 hours prior to coming to the ER. Creatinine up to 4.15 with baseline being 2.5.   As he filled criteria for sepsis, he was started on antibiotics     On follow up on 10.30.21: Seen by Nephrology for AMAYA which is thought to be secondary to prerenal (diarrhea and vomiting).   Also seen by  GI and believed to have recent GI bleed and planned to have EGD on 11.2.21 as needs to be off plavix for 5 days at least. Odonophagia will be evaluated as well.     Feeling better and getting strength back. The difficulty swallowing has resolved, denies black stools and has not had bowel movement since 10.28.21. tongue is no longer swollen.    On follow up on 11.2.21: Preparing to go into EGD today, Feeling well without new complaints. Complete 10 of the previously described GI symptoms    Past Medical History:  Past Medical History:   Diagnosis Date   • 3-vessel CAD 8/11/2020   • Allergic rhinitis    • Anxiety disorder 4/27/2020   • Arthritis    • Asymmetrical sensorineural hearing loss 6/28/2017   • Atherosclerosis of native artery of both lower extremities with intermittent claudication (Formerly [facility]) 7/18/2019   • Avascular necrosis of femoral head, left ([facility]) 07/11/2020    right hip after surgery   • Carotid stenosis    • Chronic mucoid otitis media    • Chronic rhinitis    • Coronary artery disease     HEART BYPASS 2004   • Crohn's disease of large intestine with other complication ([facility]) 7/30/2020    Chronic diarrhea Colonoscopy July 2020 revealed mild patchy scattered hemosiderin staining with inflammation more so in rectosigmoid area.  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   • Iron deficiency anemia 7/14/2020   • Mixed hearing loss of left ear    • PAD (peripheral artery disease) ([facility]) 8/11/2020   • Perianal abscess    • Pernicious anemia 08/17/2020    took shots but never diagnosed with b12 deficiency   • Personal history of alcoholism ([facility]) 08/11/2020    quit drinking in  2013   • Prostatic hypertrophy 8/11/2020   • Sensorineural hearing loss    • Sepsis with acute renal failure ([facility]) 9/15/2020   • Shortness of breath 5/27/2021   • Tinnitus    • Ventricular tachycardia, nonsustained ([facility]) 7/14/2020   • Weight loss 7/11/2020     Prior Surgeries:  Past Surgical History:   Procedure Laterality Date   • ARTERY SURGERY  2021    right side on neck   • CAROTID ENDARTERECTOMY Right 5/10/2021    Procedure: RIGHT CAROTID ENDARTERECTOMY WITH EEG;  Surgeon: Gil Pineda DO;  Location: [facility] HYBRID OR 12;  Service: Vascular;  Laterality: Right;   • COLONOSCOPY N/A 7/2/2020    Procedure: COLONOSCOPY WITH ANESTHESIA;  Surgeon: Adrien Brewster MD;  Location: [facility] ENDOSCOPY;  Service: Gastroenterology;  Laterality: N/A;  pre op: diarrhea  post op: polyps  PCP: Joe Velasco MD   • COLONOSCOPY N/A 10/13/2020    Procedure: COLONOSCOPY WITH ANESTHESIA;  Surgeon: Adrien Brewster MD;  Location: [facility] ENDOSCOPY;  Service: Gastroenterology;  Laterality: N/A;  Pre: Chronic Diarrhea, Crohn's  Post: AVM  Dr. Neftali Velasco  CO2 Inflation Used   • CORONARY ARTERY BYPASS GRAFT  2003    x3   • EYE SURGERY Bilateral     catorac   • INCISION AND DRAINAGE PERIRECTAL ABSCESS N/A 3/3/2017    Procedure: INCISION AND DRAINAGE OF JEET ANAL ABSCESS;  Surgeon: Lynette Smith MD;  Location: [facility] OR;  Service:    • MYRINGOTOMY W/ TUBES Left 04/17/2017    06/10/2016   • TONSILLECTOMY     • TOTAL HIP ARTHROPLASTY Right 2006        Allergies:Lortab [hydrocodone-acetaminophen] and Allopurinol  PTA Meds:  Medications Prior to Admission   Medication Sig Dispense Refill Last Dose   • albuterol sulfate  (90 Base) MCG/ACT inhaler USE 2 INHALATIONS FOUR TIMES A DAY AS NEEDED 20.1 g 3 Past Month at Unknown time   • ALPRAZolam (Xanax) 0.25 MG tablet Take 1 tablet by mouth At Night As Needed for Anxiety. 30 tablet 3 10/27/2021 at Unknown time   • aspirin (ASPIR) 81 MG EC tablet Take 81 mg by mouth Daily.    10/28/2021 at Unknown time   • azelastine (ASTELIN) 0.1 % nasal spray 1 spray into the nostril(s) as directed by provider 2 (Two) Times a Day. Use in each nostril as directed 90 mL 3 Past Month at Unknown time   • fluticasone (FLONASE) 50 MCG/ACT nasal spray 2 sprays into the nostril(s) as directed by provider Daily As Needed for Rhinitis.   Past Week at Unknown time   • vitamin D (ERGOCALCIFEROL) 1.25 MG (14072 UT) capsule capsule Take 1 capsule by mouth 1 (One) Time Per Week. 15 capsule 3 Past Week at Unknown time   • amLODIPine (NORVASC) 10 MG tablet TAKE 1 TABLET DAILY 90 tablet 3 10/28/2021   • atorvastatin (LIPITOR) 10 MG tablet Take 1 tablet by mouth Daily. 90 tablet 3 10/28/2021   • cholestyramine (QUESTRAN) 4 g packet TAKE 1 PACKET BY MOUTH DAILY 30 each 5 10/28/2021   • cloNIDine (CATAPRES) 0.2 MG tablet TAKE 3 TABLETS DAILY 270 tablet 3 10/28/2021   • clopidogrel (Plavix) 75 MG tablet Take 1 tablet by mouth Daily. 90 tablet 3 10/28/2021   • Copper Gluconate 2 MG tablet Take 2 mg by mouth Daily. 30 tablet 6 10/28/2021   • diphenhydrAMINE (Benadryl Allergy) 25 mg capsule Take 25 mg by mouth Every 6 (Six) Hours As Needed for Itching.   10/27/2021   • fexofenadine (ALLEGRA) 180 MG tablet Take 180 mg by mouth Daily.   10/28/2021   • furosemide (Lasix) 20 MG tablet Take 1 tablet by mouth Daily. 90 tablet 3 10/28/2021   • hydrALAZINE (APRESOLINE) 25 MG tablet Take 1 tablet by mouth 3 (Three) Times a Day. 90 tablet 5 10/28/2021   • levothyroxine (Synthroid) 75 MCG tablet Take 1 tablet by mouth Daily. 90 tablet 3 10/28/2021   • magnesium chloride ER 64 MG DR tablet Take 143 mg by mouth Daily.   10/28/2021   • Melatonin 10 MG capsule Take 2 capsules by mouth Every Night.   10/28/2021   • metoprolol succinate XL (TOPROL-XL) 25 MG 24 hr tablet Take 1 tablet by mouth Daily. 90 tablet 3 10/28/2021   • Multiple Vitamins-Minerals (PRESERVISION/LUTEIN) capsule Take 1 capsule by mouth 2 (two) times a day.   10/28/2021   •  omeprazole (priLOSEC) 20 MG capsule Take 1 capsule by mouth Daily. 90 capsule 3 10/28/2021   • potassium chloride 10 MEQ CR tablet Take 1 tablet by mouth 2 (Two) Times a Day. 180 tablet 3 10/28/2021   • sodium bicarbonate 650 MG tablet Take 1 tablet by mouth 2 (Two) Times a Day. (Patient taking differently: Take 650 mg by mouth 3 (Three) Times a Day.) 180 tablet 3 10/28/2021   • valsartan (DIOVAN) 160 MG tablet Take 160 mg by mouth Daily.   10/28/2021      Current Meds:   Current Facility-Administered Medications   Medication Dose Route Frequency Provider Last Rate Last Admin   • acetaminophen (TYLENOL) tablet 650 mg  650 mg Oral Q4H PRN Katherine Camarena, STERLING        Or   • acetaminophen (TYLENOL) 160 MG/5ML solution 650 mg  650 mg Oral Q4H PRN Katherine Camarena, APRSHERITA        Or   • acetaminophen (TYLENOL) suppository 650 mg  650 mg Rectal Q4H PRN Katherine Camarena, APRN       • ALPRAZolam (XANAX) tablet 0.25 mg  0.25 mg Oral Nightly PRN Katherine Camarena, APRN       • amLODIPine (NORVASC) tablet 10 mg  10 mg Oral Daily Katherine Camarena APRN   10 mg at 11/01/21 0844   • azelastine (ASTELIN) nasal spray 1 spray  1 spray Each Nare BID Katherine Camarena, APRN   1 spray at 11/01/21 2153   • cloNIDine (CATAPRES) tablet 0.2 mg  0.2 mg Oral TID With Meals Baldo Alexander MD   0.2 mg at 11/02/21 0619   • diphenhydrAMINE (BENADRYL) capsule 25 mg  25 mg Oral Q6H PRN Katherine Camarena, APRN       • First Mouthwash (Magic Mouthwash) 5 mL  5 mL Swish & Spit Q4H Katherine Camarena, APRN   5 mL at 11/01/21 0302   • HYDROmorphone (DILAUDID) injection 1 mg  1 mg Intravenous Q3H PRN Katherine Camarena, APRN       • ipratropium-albuterol (DUO-NEB) nebulizer solution 3 mL  3 mL Nebulization Q6H PRN Katherine Camarena, APRN       • labetalol (NORMODYNE,TRANDATE) injection 20 mg  20 mg Intravenous Once Jamie Pineda MD       • lactated ringers infusion  50 mL/hr Intravenous Continuous Jamie Pineda MD 50 mL/hr  at 11/01/21 1821 50 mL/hr at 11/01/21 1821   • levoFLOXacin (LEVAQUIN) 750 mg/150 mL D5W (premix) (LEVAQUIN) 750 mg  750 mg Intravenous Q48H Jamie Pineda MD   750 mg at 11/01/21 1122   • levothyroxine (SYNTHROID, LEVOTHROID) tablet 75 mcg  75 mcg Oral Q AM Katherine Camarena APRN   75 mcg at 11/02/21 0506   • mesalamine (APRISO) 24 hr capsule 1.5 g  1.5 g Oral Daily Katherine Camarena APRN   1.5 g at 11/01/21 0844   • metoprolol succinate XL (TOPROL-XL) 24 hr tablet 25 mg  25 mg Oral Daily Katherine Camarena APRN   25 mg at 11/01/21 0844   • metroNIDAZOLE (FLAGYL) 500 mg/100mL IVPB  500 mg Intravenous Q8H Katherine Camarena APRN   500 mg at 11/02/21 0506   • ondansetron (ZOFRAN) tablet 4 mg  4 mg Oral Q6H PRN Katherine Camarena APRN        Or   • ondansetron (ZOFRAN) injection 4 mg  4 mg Intravenous Q6H PRN Katherine Camarena APRSHERITA       • pantoprazole (PROTONIX) injection 40 mg  40 mg Intravenous BID AC Mike Whitney APRN   40 mg at 11/01/21 1807   • Pharmacy Consult   Does not apply Continuous PRN Katherine Camarena APRN       • polyethylene glycol (MIRALAX) packet 17 g  17 g Oral Daily Mike Whitney APRN   17 g at 11/01/21 1122   • potassium chloride (MICRO-K) CR capsule 40 mEq  40 mEq Oral Daily Jamie Pineda MD   40 mEq at 11/01/21 0843   • sodium bicarbonate tablet 325 mg  325 mg Oral BID Bolivar Rueda MD   325 mg at 11/01/21 2148   • sodium chloride 0.9 % flush 10 mL  10 mL Intravenous PRN Kendall Bermudez MD       • sodium chloride 0.9 % flush 10 mL  10 mL Intravenous Q12H Katherine Camarena APRN   10 mL at 11/01/21 2152   • sodium chloride 0.9 % flush 10 mL  10 mL Intravenous PRN Katherine Camarena APRN           Family History:family history includes No Known Problems in his daughter, father, and mother.   Social History: reports that he quit smoking about 8 years ago. His smoking use included cigarettes. He started smoking about 33 years ago. He has  a 12.50 pack-year smoking history. He has never used smokeless tobacco. He reports previous alcohol use. He reports that he does not use drugs.    Review of Systems  I reviewed the ROS as documented here and confirmed the accuracy of it with the patient today. 11/2/2021     Review of Systems   Constitutional: Positive for activity change. Negative for appetite change, chills, fatigue, fever and unexpected weight change.   HENT: Negative for congestion, dental problem, ear pain, hearing loss, mouth sores, nosebleeds, sore throat and trouble swallowing.         Painful swallowing 1 week prior to admisssion resolved   Eyes: Negative.    Respiratory: Negative for apnea, cough, chest tightness, shortness of breath and wheezing.    Cardiovascular: Negative for chest pain, palpitations and leg swelling.   Gastrointestinal: Negative for abdominal distention, abdominal pain, constipation, diarrhea, nausea and vomiting.        Black watery stools for 1 week prior to admission resolved   Genitourinary: Negative for difficulty urinating, frequency, hematuria and urgency.        No urine production 24 hours prior to admission -- resolved and making urine now   Musculoskeletal: Negative.  Negative for arthralgias, gait problem and neck stiffness.   Skin: Negative.  Negative for pallor and rash.   Allergic/Immunologic: Negative for environmental allergies.   Neurological: Positive for weakness. Negative for dizziness, seizures, syncope, light-headedness, numbness and headaches.   Hematological: Negative for adenopathy. Does not bruise/bleed easily.   Psychiatric/Behavioral: Negative.  Negative for confusion and suicidal ideas. The patient is not nervous/anxious.          Objective    I have reexamined the patient and the results are consistent with the previously documented exam. Amit Goldberg, MD     Vital Signs   Temp:  [97.9 °F (36.6 °C)-98.5 °F (36.9 °C)] 98.2 °F (36.8 °C)  Heart Rate:  [61-71] 65  Resp:  [16-20] 16  BP:  (141-189)/(54-80) 151/61    Physical Exam  Constitutional:       Appearance: Normal appearance. He is normal weight.      Comments: Out of ICU, appears much improved   HENT:      Head: Normocephalic and atraumatic.      Comments: Dentures are missing, previously noted to have slight edema of the tongue without difficulty breathing or swallowing at the time of exam     Nose: Nose normal.      Mouth/Throat:      Mouth: Mucous membranes are dry.   Eyes:      Pupils: Pupils are equal, round, and reactive to light.   Cardiovascular:      Rate and Rhythm: Normal rate and regular rhythm.      Pulses: Normal pulses.      Heart sounds: Normal heart sounds.      Comments: Pronounced heart sounds  Pulmonary:      Effort: Pulmonary effort is normal.      Breath sounds: Normal breath sounds.   Chest:      Comments: + gynecomastia  Abdominal:      General: Abdomen is flat. Bowel sounds are normal.      Palpations: Abdomen is soft.      Comments: Soft and nontender   Musculoskeletal:         General: Normal range of motion.      Cervical back: Neck supple.   Skin:     General: Skin is warm and dry.      Coloration: Skin is pale.   Neurological:      General: No focal deficit present.      Mental Status: He is alert and oriented to person, place, and time.   Psychiatric:         Attention and Perception: Attention and perception normal.         Mood and Affect: Mood and affect normal.         Speech: Speech normal.         Behavior: Behavior normal. Behavior is cooperative.         Thought Content: Thought content normal.         Cognition and Memory: Cognition and memory normal.         Judgment: Judgment normal.         Results from last 7 days   Lab Units 11/02/21  0600 11/01/21  0601 10/31/21  0520 10/29/21  1614 10/29/21  0502 10/28/21  1750 10/28/21  1750   WBC 10*3/mm3  --   --   --   --  4.64  --  16.97*   HEMOGLOBIN g/dL 8.6* 10.1* 8.7*   < > 7.5*   < > 8.0*   HEMATOCRIT % 26.2* 31.5* 26.7*   < > 22.6*   < > 25.5*    PLATELETS 10*3/mm3  --   --   --   --  151  --  348    < > = values in this interval not displayed.        Results from last 7 days   Lab Units 11/02/21  0600 11/01/21  0601 10/31/21  0520   SODIUM mmol/L 142 141 143   POTASSIUM mmol/L 3.5 3.7 3.1*   CHLORIDE mmol/L 108* 108* 111*   CO2 mmol/L 24.0 23.0 23.0   BUN mg/dL 27* 28* 32*   CREATININE mg/dL 2.46* 2.67* 2.44*   CALCIUM mg/dL 8.1* 8.0* 7.3*   BILIRUBIN mg/dL 0.2 0.3 0.2   ALK PHOS U/L 130* 151* 122*   ALT (SGPT) U/L 9 9 8   AST (SGOT) U/L 20 22 21   GLUCOSE mg/dL 96 100* 94       ABG:    No results found for: PHART, PO2ART, ZQQ6VUI    Culture Data:   No results found for: BLOODCX, URINECX, WOUNDCX, MRSACX, RESPCX, STOOLCX    Radiology:   Imaging Results (Last 7 Days)     Procedure Component Value Units Date/Time    XR Chest 1 View [[id number]] Collected: 10/28/21 1800     Updated: 10/28/21 1804    Narrative:      EXAMINATION:  XR CHEST 1 VW-  10/28/2021 4:45 PM CDT     HISTORY: Weak/Dizzy/AMS triage protocol     COMPARISON: 9/1/2021 and 5/7/2021.     FINDINGS:  There is no focal infiltrate or effusion. There has been  prior median sternotomy and heart bypass surgery. Heart size is upper  limits of normal. There is lucency and sclerosis in the right humeral  head that may represent avascular necrosis. No other acute bony  abnormality is seen.       Impression:      1. No acute appearing infiltrate or effusion.  2. Heart size upper limits of normal. Prior heart bypass surgery.  3. Probable avascular necrosis right humeral head.        This report was finalized on 10/28/2021 18:01 by Dr. Mikael Gallegos MD.    CT Soft Tissue Neck Without Contrast [[id number]] Collected: 10/28/21 1725     Updated: 10/28/21 1733    Narrative:      EXAMINATION:  CT SOFT TISSUE NECK WO CONTRAST-  10/28/2021 5:06 PM CDT     HISTORY: Pain, difficulty swallowing (renal failure)      COMPARISON: MR angiogram of the neck dated 4/21/2021     TECHNIQUE: Radiation dose equals   mGy-cm.  Automated exposure  control dose reduction technique was implemented.     Thin section axial imaging was obtained without intravenous contrast.  2-D sagittal and coronal reconstruction images were generated.     FINDINGS:      Evaluation of the soft tissue neck for masses and lymphadenopathy is  suboptimal without the benefit of iodinated contrast.     Aortic calcifications identified. The right-sided cardiophrenic directly  changes was multiple clips identified.     There is atrophy central and cortical his. No discrete brain lesion  observed.     The nasopharynx is imaged symmetrically without masses.     There are no oral cavity masses identified.     The soft palate and uvula is not thickened.     The epiglottis is not swollen enlarged. The vallecula and piriform  sinuses are imaged symmetrically without mass effect.     The supraglottic airway is widely patent without compromise. There are  no laryngeal masses.     The airway is widely patent.     No definite esophageal abnormality observed.     The prevertebral soft tissues are normal without edematous changes.     Diffuse spondylosis changes observed in the cervical spine.     Submandibular glands are imaged symmetrically. Parotid glands are also  symmetric without masses.     There are no thyroid gland nodules.     There are no enlarged cervical lymph nodes identified.     Emphysematous changes noted in the lung apices.       Impression:      1. No nonenhanced CT evidence of neck mass or definite lymphadenopathy.  This report was finalized on 10/28/2021 17:30 by Dr. Rangel Castro MD.          Pathology Results:   Lab Results   Component Value Date    PATHINTERP  06/25/2021     Peripheral blood smear, pathologist review:  Severe normochromic normocytic anemia.  Rare circulating schistocytes.  Normal white blood cell count with normal differential and morphology.  No circulating blasts.  Adequate platelets with normal morphology.             Assessment/Plan        Assessment/Plans:      Date  3/2/2017 4/28/2020  7/2/2020 9/15/20 3.11.21  5/2021 6.25.21  (gave 1 unit PRBC on 6.25.21)  7/6/2021 7.12.21  7.13.21   WBC  9.04  11.64  19.49 21.52 5.06  5.40  5.56  6.03 7.91 5.84   Hb  13.6  13.1  11.1 11.1 9.7  8.3  6.9  8.6 10.2 9.2   MCV  95.4  97.9  98.3  91.5 99.0  97.1  89.1  89.7 92.1 90.4   Plt  122  151  291  190 103  113  143  139 158 129   ANC  6.43    16.51  17.19    3.49  3.24    4.42     ALC  1.31    1.45  1.94    1.15  1.53    2.37     AMC  0.97   0.91  2.23 (!!!)     0.58  0.60    0.80     AEC  0.27   0.02  0   0.11  0.12    0.21     ABC  0.03   0.07  0.07   0.05  0.05    0.06     Ferritin             31.91 735.80 (after injectafer)       Iron             25 (L) 93       Iron SAT             6% (L) 25%       Transferrin             282 248       TIBC             420 370       Hapto             159         LDH             181         NOÉ             NEG         Mahesh              46 (L)         Zinc             146 (H)         Retic              2.34         B12              502         Folate              13         Hep C       NEG               Hepatitis B       NEG               HIV                       Creatinine  0.79  1.30 4.38  2.92      2.36 2.31       Glucose  103  125 179  104      123 109       ALT  19  73  48 19      9 14       AST  28  63 30 18      15 18       Alk phos  120  114  228 197      206 260       EGFR 97  54 13 (!!!) 21       28       CRP      23.95       0.32         ESR             18         Uric acid          8.7             TSH          8.010    3.50         T4              1.11         PT/PTT           15.8/36           RF             <10         MICHAEL             NEG         SPEP             No M spike, WNL         UPEP             WNL, NO M spike         STR             35.4 (H)         EPO             18.3 (WNL)            Date 7.20.21 7.27.21 8.3.21 9/1/2021  10/1/2021 10.28.21  10.29.21  10.30.21  (2/p 2 u  prbc)  11.2.21     WBC 10.99  10.43 6.90  6.78  6.97  16.97  4.64         Hb 11.3  12.6 11.6  10.6  10.6  8.0  7.5  7.9   8.6     MCV 93.7  91.6 91.8  91.5  94.3  98.1  94.6         Plt 173  157 134  122  123  348  151         ANC 7.39  5.43 3.90  4.18  4.48  13.71  4.04         ALC 2.26  3.53 1.99  1.48  1.58  2.05  0.23         AMC 1.03  0.94 0.57  0.84  0.67  1.04  0.32         AEC 0.06 0.33 0.34  0.17  0.14             ABC 0.06 0.05 0.07  0.07  0.06             nRBC             1.0         Ferritin 159.20 159.60        48.07           Iron 49 150        60           Iron SAT 15%  46%        18%           Transferrin 226  218        230           TIBC 337  325        343           Hapto                       LDH            166           NOÉ                       Mahesh                       Zinc                       Retic            6.88%           B12                       Folate                       Hep C                       Hepatitis B                       HIV                       Creatinine 2.44      2.44    4.15  4.24  3.09   2.46     Glucose 109      103    105  128  113   96     ALT 19      16    5  6  8   9     AST 16      22    9  14  21   20     Alk phos 201      239    169  146  131   130     EGFR 26      26    14 (!)  14 (!)  20   26     CRP                       ESR                       Uric acid 9.2  9.5                   TSH                       T4                       PT/PTT                       RF                       MICHAEL                       SPEP                       UPEP                       IgG  1522                     IgA  311                     IgM  108                     STR                       EPO                       Beta 2 MG 7.2                     Lactate                       Troponin           0.036           GI Panel           Neg                 ** Anemia, likely ACD and WALE, no evidence of AIHA  **Bone marrow biopsy showing 2% monoclonal B-cell kappa population  consistent with CLL/SLL  ** anemia may be secondary to underlying CLL (unable to stage as finding on marrow but no other signs of CLL including no elevation in lymphocytes and therefore cannot state that anemia is due to CLL   **iron deficiency and renal insufficiency likely leading to anemia of chronic disease  ** Renal Insufficiency, acutely worse on admission on 10/2021 --> IMPROVING  - chronic renal insufficiency can lead to severe anemia  - anemia workup documented in chart above   ? Peripheral smear: Severe normochromic normocytic anemia.  Rare circulating schistocytes. Normal white blood cell count with normal differential and morphology. No circulating blasts. Adequate platelets with normal morphology.   ? Copper abnormally low at 46, started copper supplementation   ? Zinc elevated at 146 on day of consultation.   ? sTf/log Ferritin 1.01 likely   be indicative of combination of iron deficiency and ACD   ? Beta 2 microglobulin excessively elevated on 7.20.21. Increased Beta 2 microglobulin can be seen in renal failure Immunoglobulin level WNL     · Anemia should  be treated with transfusions based on symptoms or usually a hb <7   ·  ABILIO replacement requires ferritin >100 and iron saturation >20% for Hb <10.   ? Patient is receiving iron and EPO as OP      -In the history of a patient with Crohn's disease, and found to have AVM’s as well likely the source of the blood loss     Bone marrow biopsy done 7/12/2021:  · with a flow cytometry showing a 2% monoclonal B-cell kappa population consistent with CLL/SLL.    · CLL panel:  ? Negative for a t (11;14)/CCND1-IGH rearrangement  ? Negative for deletion of MARIA on the long arm of chromosome 11 q. 22  ? Negative for trisomy 12  ? Negative for deletion of DLEU1, DLEU2 on the long arm of chromosome 13 q. 14 and monosomy 13  ? Negative for deletion T p53 in the short arm of chromosome 17 P 13  · Cytogenetics showed normal male karyotype  · Bone marrow biopsy and  aspirate show involvement by chronic lymphocytic leukemia/small lymphocytic lymphoma and up to 5% of cellularity, mild hypercellular marrow with maturing trilineage hematopoiesis, erythroid hyperplasia and a mild atypical small lymphocytic infiltrate with a dominant nodular pattern  · Storage iron is present     · Currently does not have indication for treatment of the CLL     6.25.21 1 unit PRBC   6.29.21 750 mg Injectafer   7.6.21 Started EPO supplementation   10/29/21 2 U PRBC       - tranfuse PRBC for symptomatic anemia or Hb <8 (due to cardiac issues).  Hemoglobin currently stable and the patient is pending EGD on 11/2/2021    ** Infection status:   · Covid vaccination status MODERNA with the second dose in 4/2021     **Metabolic / Renal:   · Chronic renal failure   · Acute on chronic renal failure during admission on 10/2021 --> prerenal and improving  -Uric acid noted to be elevate, I wanted to start the patient on allopurinol 100 mg daily but je has an allopurinol allergy      ** Endocrine:   · hypothyroidism being treated by PCP and can certainly affect anemia     ** GI:   -IBD SGI diagnostic consistent with Crohn's disease with chronic diarrhea can lead to anemia.  Being followed by Dr. Brewster with stability and Crohn's disease  -History of chronic alcoholism can most certainly be an explanation for thrombocytopenia due to hepatosplenomegaly and hypersplenism.  For better evaluation would proceed with a ultrasound of the abdomen, 7.12.21: 1.  Cirrhotic morphology/appearance of the liver. No solid liver lesion. No evidence of ascites. 2.  Spleen is within normal limits in size measuring 12.0 x 4.8 x 1.0 cm.3.  Cholelithiasis without evidence of cholecystitis. This may require GI evaluation  -Noted to have an elevated alkaline phosphatase and sometimes elevated ALT and AST.  May be related to either alcoholism and or Crohn's disease   - noted to have AVM’s in colon.  - with EtOH history/cirrhosis, may also ?  have varices? Planned for EGD by GI on 11.2.21     ** Pulmonary:   · Current smoking status former smoker, quit in 2013  -History of pulmonary hypertension being followed by pulmonary Dr. Omalley     ** Cardiology:   -Ventricular tachycardia, CAD, atherosclerosis with intermittent claudication, carotid stenosis, heart disease, hyperlipidemia, hypertension all to be followed up by PCP/cardiology as needed     ** Skin / Rash:   · ecchymoses that may be due to age or blood thinner or both        ** Rheumatology/Musculoskeletal  -History of avascular necrosis of the femoral head after surgery on the hip  - arthritis can also lead to anemia     ** Neurologic:   · Hearing loss and tinnitus to be followed up by PCP     ** Health Maintenance  -  Last colonoscopy 7/2/2020: 1.  Large intestine, cecum, biopsy: A.  Fragments of benign colonic mucosa demonstrating minimal active inflammation, nonspecific. B.  No glandular dysplasia identified. 2.  Large intestine, transverse colon, biopsy:  A.  Fragments of benign colonic mucosa demonstrating mild active inflammation, nonspecific. B.  No glandular dysplasia identified.  3.  Large intestine, distal transverse colon polyps x2, polypectomy: Fragments of adenomatous polyps.   4.  Large intestine, colon at 35 cm, biopsy: A.  Fragments of benign colonic mucosa demonstrating mild active inflammation, nonspecific. B.  No glandular dysplasia identified. 5.  Large intestine, hepatic flexure, polypectomy: Fragments of adenomatous polyp. 6.  Large intestine, proximal transverse colon, polypectomy: Polypoid fragment of benign colonic mucosa.  Comment: Histologic changes of an adenomatous polyp or hyperplastic polyp are not identified.   7.  Large intestine, rectum, biopsy: A.  Fragments of benign, large intestinal mucosa demonstrating minimal active inflammation, nonspecific. B.  No glandular dysplasia identified.        Advance Care Planning     ACP discussion was held with the patient during  this visit. Patient does not have an advance directive, information provided.: FULL CODE and HCP is alex Hugo       Thank you for allowing me to participate in the care of this patient.  Please ensure the patient follows up with me on discharge before November 16, 2021 in the office    Amit Goldberg, MD  Hem/Onc

## 2021-11-02 NOTE — PLAN OF CARE
Goal Outcome Evaluation:  Plan of Care Reviewed With: patient        Progress: no change  Outcome Summary: A & O, BP high this am, Dr Alexander notified, clonidine changed to TID as he takes at home as patient feels this is why his BP is high, no c/o pain or nausea, NPO since midnight for Endo, IVF and IV Flagyl continues, voiding per urinal, self turning, safety maintained

## 2021-11-02 NOTE — ANESTHESIA POSTPROCEDURE EVALUATION
Patient: Ricardo Hugo    Procedure Summary     Date: 11/02/21 Room / Location: [facility] ENDOSCOPY 4 / [facility] ENDOSCOPY    Anesthesia Start: 1235 Anesthesia Stop: 1300    Procedure: ESOPHAGOGASTRODUODENOSCOPY WITH ANESTHESIA (N/A ) Diagnosis:       Gastrointestinal hemorrhage, unspecified gastrointestinal hemorrhage type      (Gastrointestinal hemorrhage, unspecified gastrointestinal hemorrhage type [K92.2])    Surgeons: Bridger Bell MD Provider: Ash Keller CRNA    Anesthesia Type: MAC ASA Status: 3          Anesthesia Type: MAC    Vitals  Vitals Value Taken Time   /58 11/02/21 1331   Temp     Pulse 62 11/02/21 1332   Resp 17 11/02/21 1325   SpO2 97 % 11/02/21 1332   Vitals shown include unvalidated device data.        Post Anesthesia Care and Evaluation    Patient location during evaluation: PHASE II  Level of consciousness: awake and alert  Pain management: adequate  Airway patency: patent  Anesthetic complications: No anesthetic complications    Cardiovascular status: acceptable  Respiratory status: acceptable  Hydration status: acceptable

## 2021-11-03 ENCOUNTER — READMISSION MANAGEMENT (OUTPATIENT)
Dept: CALL CENTER | Facility: HOSPITAL | Age: 71
End: 2021-11-03

## 2021-11-03 VITALS
TEMPERATURE: 98.3 F | BODY MASS INDEX: 19.91 KG/M2 | RESPIRATION RATE: 18 BRPM | DIASTOLIC BLOOD PRESSURE: 62 MMHG | OXYGEN SATURATION: 98 % | WEIGHT: 147 LBS | HEART RATE: 67 BPM | HEIGHT: 72 IN | SYSTOLIC BLOOD PRESSURE: 150 MMHG

## 2021-11-03 DIAGNOSIS — K29.61 GASTROINTESTINAL HEMORRHAGE ASSOCIATED WITH OTHER GASTRITIS: Primary | ICD-10-CM

## 2021-11-03 LAB
ALBUMIN SERPL-MCNC: 2.5 G/DL (ref 3.5–5.2)
ALBUMIN/GLOB SERPL: 1 G/DL
ALP SERPL-CCNC: 132 U/L (ref 39–117)
ALT SERPL W P-5'-P-CCNC: 11 U/L (ref 1–41)
ANION GAP SERPL CALCULATED.3IONS-SCNC: 7 MMOL/L (ref 5–15)
AST SERPL-CCNC: 24 U/L (ref 1–40)
BILIRUB SERPL-MCNC: 0.2 MG/DL (ref 0–1.2)
BUN SERPL-MCNC: 21 MG/DL (ref 8–23)
BUN/CREAT SERPL: 8.8 (ref 7–25)
CALCIUM SPEC-SCNC: 8 MG/DL (ref 8.6–10.5)
CHLORIDE SERPL-SCNC: 108 MMOL/L (ref 98–107)
CO2 SERPL-SCNC: 24 MMOL/L (ref 22–29)
CREAT SERPL-MCNC: 2.38 MG/DL (ref 0.76–1.27)
DEPRECATED RDW RBC AUTO: 56.8 FL (ref 37–54)
ERYTHROCYTE [DISTWIDTH] IN BLOOD BY AUTOMATED COUNT: 15.9 % (ref 12.3–15.4)
FERRITIN SERPL-MCNC: 458 NG/ML (ref 30–400)
GFR SERPL CREATININE-BSD FRML MDRD: 27 ML/MIN/1.73
GLOBULIN UR ELPH-MCNC: 2.4 GM/DL
GLUCOSE SERPL-MCNC: 101 MG/DL (ref 65–99)
HCT VFR BLD AUTO: 24.8 % (ref 37.5–51)
HCT VFR BLD AUTO: 24.8 % (ref 37.5–51)
HGB BLD-MCNC: 7.9 G/DL (ref 13–17.7)
HGB BLD-MCNC: 7.9 G/DL (ref 13–17.7)
IRON 24H UR-MRATE: 73 MCG/DL (ref 59–158)
IRON SATN MFR SERPL: 39 % (ref 20–50)
MAGNESIUM SERPL-MCNC: 1.4 MG/DL (ref 1.6–2.4)
MCH RBC QN AUTO: 30.4 PG (ref 26.6–33)
MCHC RBC AUTO-ENTMCNC: 31.6 G/DL (ref 31.5–35.7)
MCV RBC AUTO: 96.2 FL (ref 79–97)
PLATELET # BLD AUTO: 121 10*3/MM3 (ref 140–450)
PMV BLD AUTO: 10.1 FL (ref 6–12)
POTASSIUM SERPL-SCNC: 3.9 MMOL/L (ref 3.5–5.2)
PROT SERPL-MCNC: 4.9 G/DL (ref 6–8.5)
RBC # BLD AUTO: 2.63 10*6/MM3 (ref 4.14–5.8)
SODIUM SERPL-SCNC: 139 MMOL/L (ref 136–145)
TIBC SERPL-MCNC: 188 MCG/DL (ref 298–536)
TRANSFERRIN SERPL-MCNC: 126 MG/DL (ref 200–360)
WBC # BLD AUTO: 5.61 10*3/MM3 (ref 3.4–10.8)

## 2021-11-03 PROCEDURE — 80053 COMPREHEN METABOLIC PANEL: CPT | Performed by: INTERNAL MEDICINE

## 2021-11-03 PROCEDURE — 99232 SBSQ HOSP IP/OBS MODERATE 35: CPT | Performed by: INTERNAL MEDICINE

## 2021-11-03 PROCEDURE — 82728 ASSAY OF FERRITIN: CPT | Performed by: INTERNAL MEDICINE

## 2021-11-03 PROCEDURE — 84466 ASSAY OF TRANSFERRIN: CPT | Performed by: INTERNAL MEDICINE

## 2021-11-03 PROCEDURE — 85027 COMPLETE CBC AUTOMATED: CPT | Performed by: INTERNAL MEDICINE

## 2021-11-03 PROCEDURE — 84238 ASSAY NONENDOCRINE RECEPTOR: CPT | Performed by: INTERNAL MEDICINE

## 2021-11-03 PROCEDURE — 83735 ASSAY OF MAGNESIUM: CPT | Performed by: INTERNAL MEDICINE

## 2021-11-03 PROCEDURE — 83540 ASSAY OF IRON: CPT | Performed by: INTERNAL MEDICINE

## 2021-11-03 RX ORDER — PANTOPRAZOLE SODIUM 40 MG/1
40 TABLET, DELAYED RELEASE ORAL
Qty: 60 TABLET | Refills: 0 | Status: SHIPPED | OUTPATIENT
Start: 2021-11-03 | End: 2021-11-09

## 2021-11-03 RX ORDER — SODIUM BICARBONATE 650 MG/1
325 TABLET ORAL 2 TIMES DAILY
Qty: 180 TABLET | Refills: 3
Start: 2021-11-03 | End: 2023-03-27 | Stop reason: SDUPTHER

## 2021-11-03 RX ADMIN — MESALAMINE 1.5 G: 375 CAPSULE, EXTENDED RELEASE ORAL at 08:22

## 2021-11-03 RX ADMIN — LEVOTHYROXINE SODIUM 75 MCG: 75 TABLET ORAL at 05:31

## 2021-11-03 RX ADMIN — METOPROLOL SUCCINATE 25 MG: 25 TABLET, EXTENDED RELEASE ORAL at 08:21

## 2021-11-03 RX ADMIN — CLONIDINE HYDROCHLORIDE 0.2 MG: 0.2 TABLET ORAL at 11:11

## 2021-11-03 RX ADMIN — AMLODIPINE BESYLATE 10 MG: 10 TABLET ORAL at 08:21

## 2021-11-03 RX ADMIN — MAGNESIUM GLUCONATE 500 MG ORAL TABLET 800 MG: 500 TABLET ORAL at 11:24

## 2021-11-03 RX ADMIN — CLONIDINE HYDROCHLORIDE 0.2 MG: 0.2 TABLET ORAL at 08:22

## 2021-11-03 RX ADMIN — AZELASTINE HYDROCHLORIDE 1 SPRAY: 137 SPRAY, METERED NASAL at 08:24

## 2021-11-03 RX ADMIN — PANTOPRAZOLE SODIUM 40 MG: 40 TABLET, DELAYED RELEASE ORAL at 08:21

## 2021-11-03 RX ADMIN — SODIUM BICARBONATE 325 MG: 650 TABLET ORAL at 08:21

## 2021-11-03 RX ADMIN — POTASSIUM CHLORIDE 40 MEQ: 10 CAPSULE, COATED, EXTENDED RELEASE ORAL at 08:21

## 2021-11-03 RX ADMIN — Medication 10 ML: at 08:24

## 2021-11-03 NOTE — PROGRESS NOTES
[facility] Oncology/Hematology Services  FU NOTE    PATIENT NAME:  Ricardo Hugo  YOB: 1950  PATIENT MRN:  [id number]    Date of Admission:  10/28/2021  FU Date:  11/3/2021  Referring Provider: Kendall Bermudez, *    Subjective     Admitted through ER on 10.28.2: Presented to the ER at [facility] with odynophagia that started 2 days prior to arrival with a pain of 7/10 with nausea and vomiting stating that “he could not keep anything down”. He also stated that he had “black watery diarrhea for the past week”. He was started on a protonic drip and given 1 unit of PRBC in the ER for symptomatic anemia. He was also noted to have an elevation in the WBC with bands. He reported having a swollen tongue and in the ER it appeared to indeed be oedematous. He was admitted for GI bleed. Patient was seen on 9/21/2021 by Dr. Brewster from gastroenterology for his Crohn's disease.  At that time he stated that he has chronic diarrhea but it was currently resolved.  It was noted the patient does have a history of colonic AVMs and is on Plavix as well as taking Aleve intermittently.  It was determined that the patient has Crohn's but with minimal inflammation at most and chronic diarrhea is resolved.  As there were no evidence of active Crohn's at the time, recommendations were to continue him on Apriso (mesalamine).  The AVMs and his chronic anticoagulation were thought to contribute to his chronic blood loss anemia and he did not wish to pursue an EGD at that time but was recommended to stop his Aleve p.m. for sleep purposes as may increase bleeding.  He was to follow-up with GI in 1 year.     He stopped making urine 24 hours prior to coming to the ER. Creatinine up to 4.15 with baseline being 2.5.   As he filled criteria for sepsis, he was started on antibiotics     On follow up on 10.30.21: Seen by Nephrology for AMAYA which is thought to be secondary to prerenal (diarrhea and vomiting).   Also seen by  GI and believed to have recent GI bleed and planned to have EGD on 11.2.21 as needs to be off plavix for 5 days at least. Odonophagia will be evaluated as well.     Feeling better and getting strength back. The difficulty swallowing has resolved, denies black stools and has not had bowel movement since 10.28.21. tongue is no longer swollen.    On follow up on 11.2.21: Preparing to go into EGD today, Feeling well without new complaints. Complete 10 of the previously described GI symptoms    On follow up on 11.3.21:     S/P EGD on 11.3.21:   • Benign-appearing esophageal ring.  • Z-line irregular.  • Small hiatal hernia.  • Erythematous mucosa in the stomach.  • Non-bleeding erosive gastropathy.  • Two angioectasias in the duodenum. Treated with bipolar cautery.    Patient was placed on liquid diet with a plan to advance the diet and possible d/c today.  Will try and set him up for f/u next week with me in the [facility].    Past Medical History:  Past Medical History:   Diagnosis Date   • 3-vessel CAD 8/11/2020   • Allergic rhinitis    • Anxiety disorder 4/27/2020   • Arthritis    • Asymmetrical sensorineural hearing loss 6/28/2017   • Atherosclerosis of native artery of both lower extremities with intermittent claudication ([facility]) 7/18/2019   • Avascular necrosis of femoral head, left ([facility]) 07/11/2020    right hip after surgery   • Carotid stenosis    • Chronic mucoid otitis media    • Chronic rhinitis    • Coronary artery disease     HEART BYPASS 2004   • Crohn's disease of large intestine with other complication ([facility]) 7/30/2020    Chronic diarrhea Colonoscopy July 2020 revealed mild patchy scattered hemosiderin staining with inflammation more so in rectosigmoid area.  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   • Iron deficiency anemia 7/14/2020   • Mixed hearing loss of left ear    • PAD (peripheral artery disease) ([facility]) 8/11/2020   • Perianal abscess    • Pernicious anemia 08/17/2020    took shots but never diagnosed with b12 deficiency   • Personal history of alcoholism ([facility]) 08/11/2020    quit drinking in 2013   • Prostatic hypertrophy 8/11/2020   • Sensorineural hearing loss    • Sepsis with acute renal failure ([facility]) 9/15/2020   • Shortness of breath 5/27/2021   • Tinnitus    • Ventricular tachycardia, nonsustained ([facility]) 7/14/2020   • Weight loss 7/11/2020     Prior Surgeries:  Past Surgical History:   Procedure Laterality Date   • ARTERY SURGERY  2021    right side on neck   • CAROTID ENDARTERECTOMY Right 5/10/2021    Procedure: RIGHT CAROTID ENDARTERECTOMY WITH EEG;  Surgeon: Gil Pineda DO;  Location: [facility] OR ;  Service: Vascular;  Laterality: Right;   • COLONOSCOPY N/A 7/2/2020    Procedure: COLONOSCOPY WITH ANESTHESIA;  Surgeon: Adrien Brewster MD;  Location: [facility] ENDOSCOPY;  Service: Gastroenterology;  Laterality: N/A;  pre op: diarrhea  post op: polyps  PCP: Joe Velasco MD   • COLONOSCOPY N/A 10/13/2020    Procedure: COLONOSCOPY WITH ANESTHESIA;  Surgeon: Adrien Brewster MD;  Location: [facility] ENDOSCOPY;  Service: Gastroenterology;  Laterality: N/A;  Pre: Chronic Diarrhea, Crohn's  Post: AVM  Dr. Neftali Velasco  CO2 Inflation Used   • CORONARY ARTERY BYPASS GRAFT  2003    x3   • EYE SURGERY Bilateral     catorac   • INCISION AND DRAINAGE PERIRECTAL ABSCESS N/A 3/3/2017    Procedure: INCISION AND DRAINAGE OF JEET ANAL ABSCESS;  Surgeon: Lynette Smith MD;  Location: [facility] OR;  Service:    • MYRINGOTOMY W/ TUBES Left 04/17/2017    06/10/2016   • TONSILLECTOMY     • TOTAL HIP ARTHROPLASTY Right 2006        Allergies:Lortab  [hydrocodone-acetaminophen] and Allopurinol  PTA Meds:  Medications Prior to Admission   Medication Sig Dispense Refill Last Dose   • albuterol sulfate  (90 Base) MCG/ACT inhaler USE 2 INHALATIONS FOUR TIMES A DAY AS NEEDED 20.1 g 3 Past Month at Unknown time   • ALPRAZolam (Xanax) 0.25 MG tablet Take 1 tablet by mouth At Night As Needed for Anxiety. 30 tablet 3 10/27/2021 at Unknown time   • aspirin (ASPIR) 81 MG EC tablet Take 81 mg by mouth Daily.   10/28/2021 at Unknown time   • azelastine (ASTELIN) 0.1 % nasal spray 1 spray into the nostril(s) as directed by provider 2 (Two) Times a Day. Use in each nostril as directed 90 mL 3 Past Month at Unknown time   • fluticasone (FLONASE) 50 MCG/ACT nasal spray 2 sprays into the nostril(s) as directed by provider Daily As Needed for Rhinitis.   Past Week at Unknown time   • vitamin D (ERGOCALCIFEROL) 1.25 MG (73990 UT) capsule capsule Take 1 capsule by mouth 1 (One) Time Per Week. 15 capsule 3 Past Week at Unknown time   • amLODIPine (NORVASC) 10 MG tablet TAKE 1 TABLET DAILY 90 tablet 3 10/28/2021   • atorvastatin (LIPITOR) 10 MG tablet Take 1 tablet by mouth Daily. 90 tablet 3 10/28/2021   • cholestyramine (QUESTRAN) 4 g packet TAKE 1 PACKET BY MOUTH DAILY 30 each 5 10/28/2021   • cloNIDine (CATAPRES) 0.2 MG tablet TAKE 3 TABLETS DAILY 270 tablet 3 10/28/2021   • clopidogrel (Plavix) 75 MG tablet Take 1 tablet by mouth Daily. 90 tablet 3 10/28/2021   • Copper Gluconate 2 MG tablet Take 2 mg by mouth Daily. 30 tablet 6 10/28/2021   • diphenhydrAMINE (Benadryl Allergy) 25 mg capsule Take 25 mg by mouth Every 6 (Six) Hours As Needed for Itching.   10/27/2021   • fexofenadine (ALLEGRA) 180 MG tablet Take 180 mg by mouth Daily.   10/28/2021   • furosemide (Lasix) 20 MG tablet Take 1 tablet by mouth Daily. 90 tablet 3 10/28/2021   • hydrALAZINE (APRESOLINE) 25 MG tablet Take 1 tablet by mouth 3 (Three) Times a Day. 90 tablet 5 10/28/2021   • levothyroxine (Synthroid)  75 MCG tablet Take 1 tablet by mouth Daily. 90 tablet 3 10/28/2021   • magnesium chloride ER 64 MG DR tablet Take 143 mg by mouth Daily.   10/28/2021   • Melatonin 10 MG capsule Take 2 capsules by mouth Every Night.   10/28/2021   • metoprolol succinate XL (TOPROL-XL) 25 MG 24 hr tablet Take 1 tablet by mouth Daily. 90 tablet 3 10/28/2021   • Multiple Vitamins-Minerals (PRESERVISION/LUTEIN) capsule Take 1 capsule by mouth 2 (two) times a day.   10/28/2021   • omeprazole (priLOSEC) 20 MG capsule Take 1 capsule by mouth Daily. 90 capsule 3 10/28/2021   • potassium chloride 10 MEQ CR tablet Take 1 tablet by mouth 2 (Two) Times a Day. 180 tablet 3 10/28/2021   • sodium bicarbonate 650 MG tablet Take 1 tablet by mouth 2 (Two) Times a Day. (Patient taking differently: Take 650 mg by mouth 3 (Three) Times a Day.) 180 tablet 3 10/28/2021   • valsartan (DIOVAN) 160 MG tablet Take 160 mg by mouth Daily.   10/28/2021      Current Meds:   Current Facility-Administered Medications   Medication Dose Route Frequency Provider Last Rate Last Admin   • acetaminophen (TYLENOL) tablet 650 mg  650 mg Oral Q4H PRN Bridger Bell MD        Or   • acetaminophen (TYLENOL) 160 MG/5ML solution 650 mg  650 mg Oral Q4H PRN Bridger Bell MD        Or   • acetaminophen (TYLENOL) suppository 650 mg  650 mg Rectal Q4H PRN Bridger Bell MD       • ALPRAZolam (XANAX) tablet 0.25 mg  0.25 mg Oral Nightly PRN Bridger Bell MD       • amLODIPine (NORVASC) tablet 10 mg  10 mg Oral Daily Bridger Bell MD   10 mg at 11/02/21 0906   • azelastine (ASTELIN) nasal spray 1 spray  1 spray Each Nare BID Bridger Bell MD   1 spray at 11/02/21 2011   • cloNIDine (CATAPRES) tablet 0.2 mg  0.2 mg Oral TID With Meals Bridger Bell MD   0.2 mg at 11/02/21 1750   • diphenhydrAMINE (BENADRYL) capsule 25 mg  25 mg Oral Q6H PRN Bridger Bell MD       • First Mouthwash (Magic Mouthwash) 5 mL  5 mL Swish & Spit Q4H Bridger Bell MD    5 mL at 11/02/21 0910   • HYDROmorphone (DILAUDID) injection 1 mg  1 mg Intravenous Q3H PRN Bridger Bell MD       • ipratropium-albuterol (DUO-NEB) nebulizer solution 3 mL  3 mL Nebulization Q6H PRN Bridger Bell MD       • labetalol (NORMODYNE,TRANDATE) injection 20 mg  20 mg Intravenous Once Bridger Bell MD       • lactated ringers infusion  50 mL/hr Intravenous Continuous Bridger Bell MD 50 mL/hr at 11/01/21 1821 50 mL/hr at 11/01/21 1821   • levothyroxine (SYNTHROID, LEVOTHROID) tablet 75 mcg  75 mcg Oral Q AM Bridger Bell MD   75 mcg at 11/03/21 0531   • mesalamine (APRISO) 24 hr capsule 1.5 g  1.5 g Oral Daily Bridger Bell MD   1.5 g at 11/02/21 0908   • metoprolol succinate XL (TOPROL-XL) 24 hr tablet 25 mg  25 mg Oral Daily Bridger Bell MD   25 mg at 11/02/21 0908   • ondansetron (ZOFRAN) tablet 4 mg  4 mg Oral Q6H PRN Bridger Bell MD        Or   • ondansetron (ZOFRAN) injection 4 mg  4 mg Intravenous Q6H PRN Bridger Bell MD       • pantoprazole (PROTONIX) EC tablet 40 mg  40 mg Oral BID Dc Reddy DO   40 mg at 11/02/21 1700   • Pharmacy Consult   Does not apply Continuous PRN Bridger Bell MD       • polyethylene glycol (MIRALAX) packet 17 g  17 g Oral Daily Bridger Bell MD   17 g at 11/02/21 0906   • potassium chloride (MICRO-K) CR capsule 40 mEq  40 mEq Oral Daily Bridger Bell MD   40 mEq at 11/02/21 0907   • sodium bicarbonate tablet 325 mg  325 mg Oral BID Bridger Bell MD   325 mg at 11/02/21 2010   • sodium chloride 0.9 % flush 10 mL  10 mL Intravenous PRN Bridger Bell MD       • sodium chloride 0.9 % flush 10 mL  10 mL Intravenous Q12H Bridger Bell MD   10 mL at 11/02/21 2010   • sodium chloride 0.9 % flush 10 mL  10 mL Intravenous PRN Bridger Bell MD       • sodium chloride 0.9 % infusion  100 mL/hr Intravenous Continuous Bridger Bell  mL/hr at 11/02/21 1235 Currently Infusing at  11/02/21 1235       Family History:family history includes No Known Problems in his daughter, father, and mother.   Social History: reports that he quit smoking about 8 years ago. His smoking use included cigarettes. He started smoking about 33 years ago. He has a 12.50 pack-year smoking history. He has never used smokeless tobacco. He reports previous alcohol use. He reports that he does not use drugs.    Review of Systems  I reviewed the ROS as documented here and confirmed the accuracy of it with the patient today. 11/3/2021     Review of Systems   Constitutional: Positive for activity change. Negative for appetite change, chills, fatigue, fever and unexpected weight change.   HENT: Negative for congestion, dental problem, ear pain, hearing loss, mouth sores, nosebleeds, sore throat and trouble swallowing.         Painful swallowing 1 week prior to admisssion resolved   Eyes: Negative.    Respiratory: Negative for apnea, cough, chest tightness, shortness of breath and wheezing.    Cardiovascular: Negative for chest pain, palpitations and leg swelling.   Gastrointestinal: Negative for abdominal distention, abdominal pain, constipation, diarrhea, nausea and vomiting.        Black watery stools for 1 week prior to admission resolved   Genitourinary: Negative for difficulty urinating, frequency, hematuria and urgency.        No urine production 24 hours prior to admission -- resolved and making urine now   Musculoskeletal: Negative.  Negative for arthralgias, gait problem and neck stiffness.   Skin: Negative.  Negative for pallor and rash.   Allergic/Immunologic: Negative for environmental allergies.   Neurological: Positive for weakness. Negative for dizziness, seizures, syncope, light-headedness, numbness and headaches.   Hematological: Negative for adenopathy. Does not bruise/bleed easily.   Psychiatric/Behavioral: Negative.  Negative for confusion and suicidal ideas. The patient is not nervous/anxious.           Objective    I have reexamined the patient and the results are consistent with the previously documented exam. Amit Goldberg, MD     Vital Signs   Temp:  [98.4 °F (36.9 °C)-98.7 °F (37.1 °C)] 98.6 °F (37 °C)  Heart Rate:  [60-77] 68  Resp:  [13-18] 18  BP: (102-185)/(49-73) 140/58    Physical Exam  Constitutional:       Appearance: Normal appearance. He is normal weight.      Comments: Out of ICU, appears much improved   HENT:      Head: Normocephalic and atraumatic.      Comments: Dentures are missing, previously noted to have slight edema of the tongue without difficulty breathing or swallowing at the time of exam     Nose: Nose normal.      Mouth/Throat:      Mouth: Mucous membranes are dry.   Eyes:      Pupils: Pupils are equal, round, and reactive to light.   Cardiovascular:      Rate and Rhythm: Normal rate and regular rhythm.      Pulses: Normal pulses.      Heart sounds: Normal heart sounds.      Comments: Pronounced heart sounds  Pulmonary:      Effort: Pulmonary effort is normal.      Breath sounds: Normal breath sounds.   Chest:      Comments: + gynecomastia  Abdominal:      General: Abdomen is flat. Bowel sounds are normal.      Palpations: Abdomen is soft.      Comments: Soft and nontender   Musculoskeletal:         General: Normal range of motion.      Cervical back: Neck supple.   Skin:     General: Skin is warm and dry.      Coloration: Skin is pale.   Neurological:      General: No focal deficit present.      Mental Status: He is alert and oriented to person, place, and time.   Psychiatric:         Attention and Perception: Attention and perception normal.         Mood and Affect: Mood and affect normal.         Speech: Speech normal.         Behavior: Behavior normal. Behavior is cooperative.         Thought Content: Thought content normal.         Cognition and Memory: Cognition and memory normal.         Judgment: Judgment normal.         Results from last 7 days   Lab Units  11/03/21  0537 11/02/21  0600 11/01/21  0601 10/29/21  1614 10/29/21  0502 10/28/21  1750 10/28/21  1750   WBC 10*3/mm3  --   --   --   --  4.64  --  16.97*   HEMOGLOBIN g/dL 7.9* 8.6* 10.1*   < > 7.5*   < > 8.0*   HEMATOCRIT % 24.8* 26.2* 31.5*   < > 22.6*   < > 25.5*   PLATELETS 10*3/mm3  --   --   --   --  151  --  348    < > = values in this interval not displayed.        Results from last 7 days   Lab Units 11/03/21  0537 11/02/21  0600 11/01/21  0601   SODIUM mmol/L 139 142 141   POTASSIUM mmol/L 3.9 3.5 3.7   CHLORIDE mmol/L 108* 108* 108*   CO2 mmol/L 24.0 24.0 23.0   BUN mg/dL 21 27* 28*   CREATININE mg/dL 2.38* 2.46* 2.67*   CALCIUM mg/dL 8.0* 8.1* 8.0*   BILIRUBIN mg/dL 0.2 0.2 0.3   ALK PHOS U/L 132* 130* 151*   ALT (SGPT) U/L 11 9 9   AST (SGOT) U/L 24 20 22   GLUCOSE mg/dL 101* 96 100*       ABG:    No results found for: PHART, PO2ART, YZA0EQJ    Culture Data:   No results found for: BLOODCX, URINECX, WOUNDCX, MRSACX, RESPCX, STOOLCX    Radiology:   Imaging Results (Last 7 Days)     Procedure Component Value Units Date/Time    XR Chest 1 View [[id number]] Collected: 10/28/21 1800     Updated: 10/28/21 1804    Narrative:      EXAMINATION:  XR CHEST 1 VW-  10/28/2021 4:45 PM CDT     HISTORY: Weak/Dizzy/AMS triage protocol     COMPARISON: 9/1/2021 and 5/7/2021.     FINDINGS:  There is no focal infiltrate or effusion. There has been  prior median sternotomy and heart bypass surgery. Heart size is upper  limits of normal. There is lucency and sclerosis in the right humeral  head that may represent avascular necrosis. No other acute bony  abnormality is seen.       Impression:      1. No acute appearing infiltrate or effusion.  2. Heart size upper limits of normal. Prior heart bypass surgery.  3. Probable avascular necrosis right humeral head.        This report was finalized on 10/28/2021 18:01 by Dr. Mikael Gallegos MD.    CT Soft Tissue Neck Without Contrast [[id number]] Collected: 10/28/21 4785     Updated:  10/28/21 1733    Narrative:      EXAMINATION:  CT SOFT TISSUE NECK WO CONTRAST-  10/28/2021 5:06 PM CDT     HISTORY: Pain, difficulty swallowing (renal failure)      COMPARISON: MR angiogram of the neck dated 4/21/2021     TECHNIQUE: Radiation dose equals  mGy-cm.  Automated exposure  control dose reduction technique was implemented.     Thin section axial imaging was obtained without intravenous contrast.  2-D sagittal and coronal reconstruction images were generated.     FINDINGS:      Evaluation of the soft tissue neck for masses and lymphadenopathy is  suboptimal without the benefit of iodinated contrast.     Aortic calcifications identified. The right-sided cardiophrenic directly  changes was multiple clips identified.     There is atrophy central and cortical his. No discrete brain lesion  observed.     The nasopharynx is imaged symmetrically without masses.     There are no oral cavity masses identified.     The soft palate and uvula is not thickened.     The epiglottis is not swollen enlarged. The vallecula and piriform  sinuses are imaged symmetrically without mass effect.     The supraglottic airway is widely patent without compromise. There are  no laryngeal masses.     The airway is widely patent.     No definite esophageal abnormality observed.     The prevertebral soft tissues are normal without edematous changes.     Diffuse spondylosis changes observed in the cervical spine.     Submandibular glands are imaged symmetrically. Parotid glands are also  symmetric without masses.     There are no thyroid gland nodules.     There are no enlarged cervical lymph nodes identified.     Emphysematous changes noted in the lung apices.       Impression:      1. No nonenhanced CT evidence of neck mass or definite lymphadenopathy.  This report was finalized on 10/28/2021 17:30 by Dr. Rangel Castro MD.          Pathology Results:   Lab Results   Component Value Date    PATHINTERP  06/25/2021     Peripheral  blood smear, pathologist review:  Severe normochromic normocytic anemia.  Rare circulating schistocytes.  Normal white blood cell count with normal differential and morphology.  No circulating blasts.  Adequate platelets with normal morphology.            Assessment/Plan        Assessment/Plans:      Date  3/2/2017 4/28/2020  7/2/2020 9/15/20 3.11.21  5/2021 6.25.21  (gave 1 unit PRBC on 6.25.21)  7/6/2021 7.12.21  7.13.21   WBC  9.04  11.64  19.49 21.52 5.06  5.40  5.56  6.03 7.91 5.84   Hb  13.6  13.1  11.1 11.1 9.7  8.3  6.9  8.6 10.2 9.2   MCV  95.4  97.9  98.3  91.5 99.0  97.1  89.1  89.7 92.1 90.4   Plt  122  151  291  190 103  113  143  139 158 129   ANC  6.43    16.51  17.19    3.49  3.24    4.42     ALC  1.31    1.45  1.94    1.15  1.53    2.37     AMC  0.97   0.91  2.23 (!!!)     0.58  0.60    0.80     AEC  0.27   0.02  0   0.11  0.12    0.21     ABC  0.03   0.07  0.07   0.05  0.05    0.06     Ferritin             31.91 735.80 (after injectafer)       Iron             25 (L) 93       Iron SAT             6% (L) 25%       Transferrin             282 248       TIBC             420 370       Hapto             159         LDH             181         NOÉ             NEG         Mahesh              46 (L)         Zinc             146 (H)         Retic              2.34         B12              502         Folate              13         Hep C       NEG               Hepatitis B       NEG               HIV                       Creatinine  0.79  1.30 4.38  2.92      2.36 2.31       Glucose  103  125 179  104      123 109       ALT  19  73  48 19      9 14       AST  28  63 30 18      15 18       Alk phos  120  114  228 197      206 260       EGFR 97  54 13 (!!!) 21       28       CRP      23.95       0.32         ESR             18         Uric acid          8.7             TSH          8.010    3.50         T4              1.11         PT/PTT           15.8/36           RF             <10         MICHAEL              NEG         SPEP             No M spike, WNL         UPEP             WNL, NO M spike         STR             35.4 (H)         EPO             18.3 (WNL)            Date 7.20.21 7.27.21 8.3.21 9/1/2021  10/1/2021 10.28.21  10.29.21  10.30.21  (2/p 2 u prbc)  11.2.21  11.3.21   WBC 10.99  10.43 6.90  6.78  6.97  16.97  4.64         Hb 11.3  12.6 11.6  10.6  10.6  8.0  7.5  7.9   8.6  7.9   MCV 93.7  91.6 91.8  91.5  94.3  98.1  94.6         Plt 173  157 134  122  123  348  151         ANC 7.39  5.43 3.90  4.18  4.48  13.71  4.04         ALC 2.26  3.53 1.99  1.48  1.58  2.05  0.23         AMC 1.03  0.94 0.57  0.84  0.67  1.04  0.32         AEC 0.06 0.33 0.34  0.17  0.14             ABC 0.06 0.05 0.07  0.07  0.06             nRBC             1.0         Ferritin 159.20 159.60        48.07           Iron 49 150        60           Iron SAT 15%  46%        18%           Transferrin 226  218        230           TIBC 337  325        343           Hapto                       LDH            166           NOÉ                       Mahesh                       Zinc                       Retic            6.88%           B12                       Folate                       Hep C                       Hepatitis B                       HIV                       Creatinine 2.44      2.44    4.15  4.24  3.09   2.46  2.38   Glucose 109      103    105  128  113   96  101   ALT 19      16    5  6  8   9  11   AST 16      22    9  14  21   20  24   Alk phos 201      239    169  146  131   130  132   EGFR 26      26    14 (!)  14 (!)  20   26  27   CRP                       ESR                       Uric acid 9.2  9.5                   TSH                       T4                       PT/PTT                       RF                       MICHAEL                       SPEP                       UPEP                       IgG  1522                     IgA  311                     IgM  108                     STR                       EPO                        Beta 2 MG 7.2                     Lactate                       Troponin           0.036           GI Panel           Neg                 ** Anemia, likely ACD and WALE, no evidence of AIHA  ** Admitted 10/2021 for massive GI Bleed  **Bone marrow biopsy showing 2% monoclonal B-cell kappa population consistent with CLL/SLL  ** anemia may be secondary to underlying CLL (unable to stage as finding on marrow but no other signs of CLL including no elevation in lymphocytes and therefore cannot state that anemia is due to CLL   **iron deficiency and renal insufficiency likely leading to anemia of chronic disease  ** Renal Insufficiency, acutely worse on admission on 10/2021 --> IMPROVING  - chronic renal insufficiency can lead to severe anemia  - anemia workup documented in chart above   ? Peripheral smear: Severe normochromic normocytic anemia.  Rare circulating schistocytes. Normal white blood cell count with normal differential and morphology. No circulating blasts. Adequate platelets with normal morphology.   ? Copper abnormally low at 46, started copper supplementation   ? Zinc elevated at 146 on day of consultation.   ? sTf/log Ferritin 1.01 likely   be indicative of combination of iron deficiency and ACD   ? Beta 2 microglobulin excessively elevated on 7.20.21. Increased Beta 2 microglobulin can be seen in renal failure Immunoglobulin level WNL     · Anemia should  be treated with transfusions based on symptoms or usually a hb <7   ·  ABILIO replacement requires ferritin >100 and iron saturation >20% for Hb <10.   ? Patient is receiving iron and EPO as OP      -In the history of a patient with Crohn's disease, and found to have AVM’s as well likely the source of the blood loss     Bone marrow biopsy done 7/12/2021:  · with a flow cytometry showing a 2% monoclonal B-cell kappa population consistent with CLL/SLL.    · CLL panel:  ? Negative for a t (11;14)/CCND1-IGH rearrangement  ? Negative for  deletion of MARIA on the long arm of chromosome 11 q. 22  ? Negative for trisomy 12  ? Negative for deletion of DLEU1, DLEU2 on the long arm of chromosome 13 q. 14 and monosomy 13  ? Negative for deletion T p53 in the short arm of chromosome 17 P 13  · Cytogenetics showed normal male karyotype  · Bone marrow biopsy and aspirate show involvement by chronic lymphocytic leukemia/small lymphocytic lymphoma and up to 5% of cellularity, mild hypercellular marrow with maturing trilineage hematopoiesis, erythroid hyperplasia and a mild atypical small lymphocytic infiltrate with a dominant nodular pattern  · Storage iron is present     · Currently does not have indication for treatment of the CLL     6.25.21 1 unit PRBC   6.29.21 750 mg Injectafer   7.6.21 Started EPO supplementation   10/29/21 2 U PRBC       - tranfuse PRBC for symptomatic anemia or Hb <8 (due to cardiac issues).   - admitted 10/2021 with massive GI bleed.    - ferritin on 10.28.21 WNL but due to GI losses, may need iron in the future, can check in the OPC. Sent ferritin and STR on 11.3.21    S/P EGD on 11.3.21:   • Benign-appearing esophageal ring.  • Z-line irregular.  • Small hiatal hernia.  • Erythematous mucosa in the stomach.  • Non-bleeding erosive gastropathy.  • Two angioectasias in the duodenum. Treated with bipolar cautery.    - patient will be discharged 11.3.21 and will follow up in the office within a week      ** Infection status:   · Covid vaccination status MODERNA with the second dose in 4/2021     **Metabolic / Renal:   · Chronic renal failure   · Acute on chronic renal failure during admission on 10/2021 --> prerenal and improving  -Uric acid noted to be elevate,  he has an allopurinol allergy      ** Endocrine:   · hypothyroidism being treated by PCP and can certainly affect anemia     ** GI:   -IBD SGI diagnostic consistent with Crohn's disease with chronic diarrhea can lead to anemia.  Being followed by Dr. Brewster with stability and  Crohn's disease  -History of chronic alcoholism can most certainly be an explanation for thrombocytopenia due to hepatosplenomegaly and hypersplenism.  For better evaluation would proceed with a ultrasound of the abdomen, 7.12.21: 1.  Cirrhotic morphology/appearance of the liver. No solid liver lesion. No evidence of ascites. 2.  Spleen is within normal limits in size measuring 12.0 x 4.8 x 1.0 cm.3.  Cholelithiasis without evidence of cholecystitis. This may require GI evaluation  -Noted to have an elevated alkaline phosphatase and sometimes elevated ALT and AST.  May be related to either alcoholism and or Crohn's disease   - noted to have AVM’s in colon.  - with EtOH history/cirrhosis    S/P EGD on 11.3.21:   • Benign-appearing esophageal ring.  • Z-line irregular.  • Small hiatal hernia.  • Erythematous mucosa in the stomach.  • Non-bleeding erosive gastropathy.  • Two angioectasias in the duodenum. Treated with bipolar cautery.     ** Pulmonary:   · Current smoking status former smoker, quit in 2013  -History of pulmonary hypertension being followed by pulmonary Dr. Omalley     ** Cardiology:   -Ventricular tachycardia, CAD, atherosclerosis with intermittent claudication, carotid stenosis, heart disease, hyperlipidemia, hypertension all to be followed up by PCP/cardiology as needed     ** Skin / Rash:   · ecchymoses that may be due to age or blood thinner or both        ** Rheumatology/Musculoskeletal  -History of avascular necrosis of the femoral head after surgery on the hip  - arthritis can also lead to anemia     ** Neurologic:   · Hearing loss and tinnitus to be followed up by PCP     ** Health Maintenance  -  Last colonoscopy 7/2/2020: 1.  Large intestine, cecum, biopsy: A.  Fragments of benign colonic mucosa demonstrating minimal active inflammation, nonspecific. B.  No glandular dysplasia identified. 2.  Large intestine, transverse colon, biopsy:  A.  Fragments of benign colonic mucosa demonstrating mild  active inflammation, nonspecific. B.  No glandular dysplasia identified.  3.  Large intestine, distal transverse colon polyps x2, polypectomy: Fragments of adenomatous polyps.   4.  Large intestine, colon at 35 cm, biopsy: A.  Fragments of benign colonic mucosa demonstrating mild active inflammation, nonspecific. B.  No glandular dysplasia identified. 5.  Large intestine, hepatic flexure, polypectomy: Fragments of adenomatous polyp. 6.  Large intestine, proximal transverse colon, polypectomy: Polypoid fragment of benign colonic mucosa.  Comment: Histologic changes of an adenomatous polyp or hyperplastic polyp are not identified.   7.  Large intestine, rectum, biopsy: A.  Fragments of benign, large intestinal mucosa demonstrating minimal active inflammation, nonspecific. B.  No glandular dysplasia identified.   - S/P EGD on 11.3.21:   • Benign-appearing esophageal ring.  • Z-line irregular.  • Small hiatal hernia.  • Erythematous mucosa in the stomach.  • Non-bleeding erosive gastropathy.  • Two angioectasias in the duodenum. Treated with bipolar cautery.     Advance Care Planning     ACP discussion was held with the patient during this visit. Patient does not have an advance directive, information provided.: FULL CODE and HCP is alex Hugo       Thank you for allowing me to participate in the care of this patient.  Please ensure the patient follows up with me on discharge before November 16, 2021 in the office and to come on 11.5.21 for labs to ensure stability of count    Amit Goldberg, MD  Hem/Onc

## 2021-11-03 NOTE — OUTREACH NOTE
Prep Survey      Responses   [facility] patient discharged from? [place]   Is LACE score < 7 ? No   Emergency Room discharge w/ pulse ox? No   Eligibility [facility]   Date of Admission 10/28/21   Date of Discharge 11/03/21   Discharge Disposition Home or Self Care   Discharge diagnosis sepsis, GI Bleed, ARF   Does the patient have one of the following disease processes/diagnoses(primary or secondary)? Sepsis   Does the patient have Home health ordered? No   Is there a DME ordered? No   Comments regarding appointments 11/17/2021,  at 10:15 am Dr. Monique, 11/9/2021,  1100 with Tahira Mendez-PCP   Prep survey completed? Yes          Lula Lee RN

## 2021-11-03 NOTE — PLAN OF CARE
Goal Outcome Evaluation:  Plan of Care Reviewed With: patient        Progress: improving  Outcome Summary: Pt alert and oriented . denies pain. Assist when out of bed. no S/S of bleeding. Possible DC this afternoon.

## 2021-11-03 NOTE — DISCHARGE SUMMARY
[facility] Medicine Services  DISCHARGE SUMMARY       Date of Admission: 10/28/2021  Date of Discharge:  11/3/2021  Primary Care Physician: Joe Velasco MD    Presenting Problem/History of Present Illness:  AMAYA (acute kidney injury) (HCC) [N17.9]     Final Discharge Diagnoses:  Active Hospital Problems    Diagnosis    • Hypomagnesemia    • Hypokalemia    • GI bleed    • CLL (chronic lymphocytic leukemia) (HCC)    • Symptomatic anemia    • Chronic diarrhea    • Sepsis (HCC)    • Metabolic acidosis, increased anion gap    • Acute renal failure superimposed on stage 4 chronic kidney disease (HCC)        Consults:  #1 Dr. Bell, GI  #2 Dr. Quiroz, Nephrology  #3 Dr. Goldberg, Hematology     Procedures Performed:   #1 EGD by Dr. Bell on 11/2    Pertinent Test Results:   Procedure Component Value Units Date/Time   XR Chest 1 View [[id number]] Bernardo as Reviewed   Order Status: Completed Collected: 10/28/21 1800    Updated: 10/28/21 1804   Narrative:     EXAMINATION:  XR CHEST 1 VW-  10/28/2021 4:45 PM CDT       HISTORY: Weak/Dizzy/AMS triage protocol       COMPARISON: 9/1/2021 and 5/7/2021.       FINDINGS:  There is no focal infiltrate or effusion. There has been   prior median sternotomy and heart bypass surgery. Heart size is upper   limits of normal. There is lucency and sclerosis in the right humeral   head that may represent avascular necrosis. No other acute bony   abnormality is seen.       Impression:     1. No acute appearing infiltrate or effusion.   2. Heart size upper limits of normal. Prior heart bypass surgery.   3. Probable avascular necrosis right humeral head.           This report was finalized on 10/28/2021 18:01 by Dr. Mikael Gallegos MD.   CT Soft Tissue Neck Without Contrast [[id number]] Bernardo as Reviewed   Order Status: Completed Collected: 10/28/21 1725    Updated: 10/28/21 1733   Narrative:     EXAMINATION:  CT SOFT TISSUE NECK WO CONTRAST-  10/28/2021  5:06 PM CDT       HISTORY: Pain, difficulty swallowing (renal failure)       COMPARISON: MR angiogram of the neck dated 4/21/2021       TECHNIQUE: Radiation dose equals  mGy-cm.  Automated exposure   control dose reduction technique was implemented.       Thin section axial imaging was obtained without intravenous contrast.   2-D sagittal and coronal reconstruction images were generated.       FINDINGS:       Evaluation of the soft tissue neck for masses and lymphadenopathy is   suboptimal without the benefit of iodinated contrast.       Aortic calcifications identified. The right-sided cardiophrenic directly   changes was multiple clips identified.       There is atrophy central and cortical his. No discrete brain lesion   observed.       The nasopharynx is imaged symmetrically without masses.       There are no oral cavity masses identified.       The soft palate and uvula is not thickened.       The epiglottis is not swollen enlarged. The vallecula and piriform   sinuses are imaged symmetrically without mass effect.       The supraglottic airway is widely patent without compromise. There are   no laryngeal masses.       The airway is widely patent.       No definite esophageal abnormality observed.       The prevertebral soft tissues are normal without edematous changes.       Diffuse spondylosis changes observed in the cervical spine.       Submandibular glands are imaged symmetrically. Parotid glands are also   symmetric without masses.       There are no thyroid gland nodules.       There are no enlarged cervical lymph nodes identified.       Emphysematous changes noted in the lung apices.       Impression:     1. No nonenhanced CT evidence of neck mass or definite lymphadenopathy.   This report was finalized on 10/28/2021 17:30 by Dr. Rangel Castro MD.       Chief Complaint on Day of Discharge:   Follow-up AMAYA on CKD and GI bleed/anemia    History of Present Illness on Day of Discharge:   Patient  resting comfortably in bed.  Tolerating p.o. without any issues.  No abdominal pain.  No nausea or vomiting.  No bleeding overnight.    Hospital Course:  The patient is a 71 y.o. male with a history of CAD, CKD, Crohn's disease, hypertension, WALE who presented to [facility] on 10/28 with tongue and throat pain.  Was having trouble swallowing.  Also then noted that although he has chronic diarrhea more recently become more black in color.  His occult stool was positive in the ER.  He was noted to have a hemoglobin drop from prior 10.6 to 8.  He was started on IV Protonix drip in the ER and given a unit of PRBC by ER provider.  He was also noted to be in acute on chronic renal failure with creatinine over 4 with a baseline somewhere in the low to mid twos.  He was started fluid resuscitation.  He is mid to the hospital.  Nephrology, hematology, GI were consulted.  A CT of his neck was done which showed no acute issues.  Chest x-ray with no infiltrate or effusion.  Patient's blood counts are monitored and his H&H stabilized.  He was taken by GI to endoscopy on 11/2 and was found to have an esophageal ring, irregular Z-line, erythematous mucosa in the stomach, nonbleeding erosive gastropathy.  He also had 2 angiectasia's in the duodenum that were cauterized.  He was monitored overnight and did well.  No further signs of bleeding.  H&H stable.  His renal function has returned to normal prior levels after IV fluid resuscitation.  He has been cleared for discharge by hematology, GI, nephrology.  Also services want to see in close follow-up.  Of note GI recommends continuing PPI twice daily and keeping off antiplatelets at this time.  Patient can follow-up with PCP and GI as an outpatient to discuss potential resumption when/if appropriate.  Otherwise patient is medically stable at this time and has maximized benefit of inpatient stay.  Okay for discharge home today.    Condition on Discharge:  Stable on room  "air    Physical Exam on Discharge:  /62 (BP Location: Right arm, Patient Position: Sitting)   Pulse 67   Temp 98.3 °F (36.8 °C) (Oral)   Resp 18   Ht 182.9 cm (72\")   Wt 66.7 kg (147 lb)   SpO2 98%   BMI 19.94 kg/m²   Physical Exam  GEN: Awake, alert, interactive, in NAD  HEENT: PERRLA, EOMI, Anicteric, Trachea midline  Lungs: CTAB, no wheezing/rales/rhonchi  Heart: RRR, +S1/s2, no rub  ABD: soft, nt/nd, +BS, no guarding/rebound  Extremities: atraumatic, no cyanosis, no edema  Skin: no rashes or lesions  Neuro: AAOx3, no focal deficits  Psych: normal mood & affect    Discharge Disposition:  Home or Self Care    Discharge Medications:     Discharge Medications      New Medications      Instructions Start Date   pantoprazole 40 MG EC tablet  Commonly known as: PROTONIX   40 mg, Oral, 2 Times Daily Before Meals         Changes to Medications      Instructions Start Date   sodium bicarbonate 650 MG tablet  What changed: how much to take   325 mg, Oral, 2 Times Daily         Continue These Medications      Instructions Start Date   albuterol sulfate  (90 Base) MCG/ACT inhaler  Commonly known as: PROVENTIL HFA;VENTOLIN HFA;PROAIR HFA   USE 2 INHALATIONS FOUR TIMES A DAY AS NEEDED      ALPRAZolam 0.25 MG tablet  Commonly known as: Xanax   0.25 mg, Oral, Nightly PRN      amLODIPine 10 MG tablet  Commonly known as: NORVASC   TAKE 1 TABLET DAILY      Apriso 0.375 g 24 hr capsule  Generic drug: mesalamine   TAKE 4 CAPSULES DAILY      atorvastatin 10 MG tablet  Commonly known as: LIPITOR   10 mg, Oral, Daily      azelastine 0.1 % nasal spray  Commonly known as: ASTELIN   1 spray, Nasal, 2 Times Daily, Use in each nostril as directed      Benadryl Allergy 25 mg capsule  Generic drug: diphenhydrAMINE   25 mg, Oral, Every 6 Hours PRN      cholestyramine 4 g packet  Commonly known as: QUESTRAN   1 packet, Oral, Daily      cloNIDine 0.2 MG tablet  Commonly known as: CATAPRES   TAKE 3 TABLETS DAILY      Copper " Gluconate 2 MG tablet   2 mg, Oral, Daily      fexofenadine 180 MG tablet  Commonly known as: ALLEGRA   180 mg, Oral, Daily      fluticasone 50 MCG/ACT nasal spray  Commonly known as: FLONASE   2 sprays, Nasal, Daily PRN      furosemide 20 MG tablet  Commonly known as: Lasix   20 mg, Oral, Daily      hydrALAZINE 25 MG tablet  Commonly known as: APRESOLINE   25 mg, Oral, 3 Times Daily      levothyroxine 75 MCG tablet  Commonly known as: Synthroid   75 mcg, Oral, Daily      magnesium chloride ER 64 MG DR tablet   143 mg, Oral, Daily      Melatonin 10 MG capsule   2 capsules, Oral, Nightly      metoprolol succinate XL 25 MG 24 hr tablet  Commonly known as: TOPROL-XL   25 mg, Oral, Daily      potassium chloride 10 MEQ CR tablet   10 mEq, Oral, 2 Times Daily      PreserVision/Lutein capsule   1 capsule, Oral, 2 times daily      vitamin D 1.25 MG (13643 UT) capsule capsule  Commonly known as: ERGOCALCIFEROL   50,000 Units, Oral, Weekly         Stop These Medications    ASPIR 81 MG EC tablet  Generic drug: aspirin     clopidogrel 75 MG tablet  Commonly known as: Plavix     omeprazole 20 MG capsule  Commonly known as: priLOSEC     valsartan 160 MG tablet  Commonly known as: DIOVAN            Discharge Diet:    Dietary Orders (From admission, onward)     Start     Ordered    11/02/21 1702  Diet Regular  Diet Effective Now        Question:  Diet Texture / Consistency  Answer:  Regular    11/02/21 1701                  Activity at Discharge:    Baseline as tolerated    Discharge Care Plan/Instructions:   Take your medications as prescribed.  Follow-up with PCP, GI, nephrology.  Discuss with PCP and GI as outpatient by resumption of antiplatelet medications when appropriate.    Follow-up Appointments:   Future Appointments   Date Time Provider Department Center   11/5/2021 11:10 AM [facility] CANCER CTR LAB [facility] CCLAB [facility]   11/11/2021 10:15 AM Goldberg, Amit, MD MGW ONC [facility] [place]   11/23/2021  7:00 AM [facility] NIVAS CART 1 [facility]  PAD   11/23/2021  8:00 AM PAD US NIVAS CART 1  PAD US PAD   11/23/2021 11:30 AM Gil Pineda DO MGW VS PAD PAD   11/30/2021 11:15 AM  PAD CANCER CTR LAB  PAD CCLAB PAD   11/30/2021 11:30 AM CHAIR 14  PAD OP INFU ONC  PAD OIONC PAD   1/27/2022  9:45 AM Joe Velasco MD MGW PC VSQ PAD   3/9/2022 10:45 AM New Omalley MD MGW RD PAD PAD       Test Results Pending at Discharge: None    Electronically signed by Dc Issa DO, 11/03/21, 10:55 CDT.    Time: 35 minutes

## 2021-11-03 NOTE — PLAN OF CARE
Goal Outcome Evaluation:  Plan of Care Reviewed With: patient        Progress: improving  Outcome Summary: A&Ox4. VSS. No c/o pain. No n/v/d. Voiding without difficulty. Up ad annalisa. Safety maintained.

## 2021-11-03 NOTE — PROGRESS NOTES
"[facility] ON[facility] HEMATOLOGY AND ONCOLOGY  [address]  [facility] [address] [phone]    Patient Name: Ricardo Hugo  Encounter Date: 11/11/2021  YOB: 1950  Patient Number: [id number]      Subjective: 71-year-old male who has a known history of chronic kidney disease who is referred by Dr. Rueda from nephrology for anemia.  The notes from [facility] show that the patient was seen on 6/3/2021 where his creatinine was noted to be 2.5 that was a little bit worse than in March 2021 but had developed worsening anemia with a hemoglobin of 7.3 and was thought to be \"very deficient in iron\".      The patient was also seen by Dr. Omalley from pulmonary medicine when the patient's PCP, Dr. Velasco, had asked pulmonary to see the patient for worrisome dyspnea.  It was noted that 10 months prior an echocardiogram that shown suggestion of pulmonary hypertension.  At that time the patient did not believe that his dyspnea was worse than it was a year previously.  Upon review of the patient's previous medical problems, there is a cornucopia of medical illnesses with the ones that could possibly be related to anemia including but not limited to the following: Three-vessel CAD, arthritis, Crohn's disease (\"slight case\" according to what he was told) with chronic diarrhea, iron deficiency anemia, personal history of alcoholism (and he states that he quit drinking in 2013).    Denies noted bleeding anywhere (only when he was on a blood thinner when he had some nosebleeds -- changed the blood thinner and the bleeding stopped).  He was only recently diagnosed with iron deficienc.  He does  eat meat on a regular basis.  Denies  signs of pica syndrome.    He had previously seen Dr. Hernandez 10 years ago but he doesn't recall why and does not knwo the resulst of the marrow (the office doesn't keep records for that long).    On follow-up on July 6, 2021: After the " patient's initial consultation, he was noted to have a hemoglobin less than 7 and therefore called in for a transfusion.  He states that he is feeling better after the transfusion was given.     6.25.21 1 unit PRBC     Patient was also noted to have a low copper and a high zinc on labs on initial consultation and copper supplementation was supposed to be started on  6/30/202 but patient did not receive from pharmacy and therefore re-ordered 7.6.21.  Hemoglobin to 8.6 on 7/6/2021.  Scheduled for BM Bx on 7.12.21 -- to hold plavix for 5 days before the procedure but OK to continue ASA    On follow-up on 7/20/2021:  Was found to have low copper and started on supplements 7.6.21, he denies any bleeding       Bone marrow biopsy done 7/12/2021:  • with a flow cytometry showing a 2% monoclonal B-cell kappa population consistent with CLL/SLL.    • CLL panel:  o Negative for a t (11;14)/CCND1-IGH rearrangement  o Negative for deletion of MARIA on the long arm of chromosome 11 q. 22  o Negative for trisomy 12  o Negative for deletion of DLEU1, DLEU2 on the long arm of chromosome 13 q. 14 and monosomy 13  o Negative for deletion T p53 in the short arm of chromosome 17 P 13  • Cytogenetics showed normal male karyotype  • Bone marrow biopsy and aspirate show involvement by chronic lymphocytic leukemia/small lymphocytic lymphoma and up to 5% of cellularity, mild hypercellular marrow with maturing trilineage hematopoiesis, erythroid hyperplasia and a mild atypical small lymphocytic infiltrate with a dominant nodular pattern  • Storage iron is present    • Ultrasound of the abdomen from 7/12/2021 shows cirrhotic liver but normal spleen size.    Patient was started on prednisone Dosepak by PCP on 7/16/2021 for allergies and has been noted to have an improvement in his CBC with a hemoglobin of 11.3 today.  However, the patient is also on erythropoietin injections as well as received iron supplementation which may also have everything  to do with the increase in hemoglobin today.    On follow-up on 8/3/12/2021:Feeling fine after the steroids were discontinued on 7/22/2021.  Not noticing any bleeding and also denies  any fatiguen or shortness of breath. Had a cough and was started on Doxycycline by PCP.  No fevers    Admitted through ER on 10.28.2: Presented to the ER at [facility] with odynophagia that started 2 days prior to arrival with a pain of 7/10 with nausea and vomiting stating that “he could not keep anything down”. He also stated that he had “black watery diarrhea for the past week”. He was started on a protonic drip and given 1 unit of PRBC in the ER for symptomatic anemia. He was also noted to have an elevation in the WBC with bands. He reported having a swollen tongue and in the ER it appeared to indeed be oedematous. He was admitted for GI bleed. Patient was seen on 9/21/2021 by Dr. Brewster from gastroenterology for his Crohn's disease.  At that time he stated that he has chronic diarrhea but it was currently resolved.  It was noted the patient does have a history of colonic AVMs and is on Plavix as well as taking Aleve intermittently.  It was determined that the patient has Crohn's but with minimal inflammation at most and chronic diarrhea is resolved.  As there were no evidence of active Crohn's at the time, recommendations were to continue him on Apriso (mesalamine).  The AVMs and his chronic anticoagulation were thought to contribute to his chronic blood loss anemia and he did not wish to pursue an EGD at that time but was recommended to stop his Aleve p.m. for sleep purposes as may increase bleeding.  He was to follow-up with GI in 1 year.     He stopped making urine 24 hours prior to coming to the ER. Creatinine up to 4.15 with baseline being 2.5.   As he filled criteria for sepsis, he was started on antibiotics      On follow up on 10.30.21: Seen by Nephrology for AMAYA which is thought to be secondary to prerenal (diarrhea and  vomiting).   Also seen by GI and believed to have recent GI bleed and planned to have EGD on 11.2.21 as needs to be off plavix for 5 days at least. Odonophagia will be evaluated as well.      Feeling better and getting strength back. The difficulty swallowing has resolved, denies black stools and has not had bowel movement since 10.28.21. tongue is no longer swollen.     On follow up on 11.2.21: Preparing to go into EGD today, Feeling well without new complaints. Complete 10 of the previously described GI symptoms     On follow up on 11.3.21:      S/P EGD on 11.3.21:   · Benign-appearing esophageal ring.  · Z-line irregular.  · Small hiatal hernia.  · Erythematous mucosa in the stomach.  · Non-bleeding erosive gastropathy.  · Two angioectasias in the duodenum. Treated with bipolar cautery.     Patient was placed on liquid diet with a plan to advance the diet and possible d/c today.  Will try and set him up for f/u next week with me in the [facility].    On follow up on 11.11.21: Discharged on 11.3.21. Follow-up CBC on 11/5/2021 showed a hemoglobin of 7.8 (stable).  Beta-2 microglobulin was also noted to be elevated at 11.8 and may reflect drawing of the blood test around the time of significant acute on chronic renal failure.     Hb on 11.11.21 is up to 8.9    Denies further bleeding, stools are normal in color and consistency and fatigue has improved.       Past Medical History:   Diagnosis Date   • 3-vessel CAD 8/11/2020   • Allergic rhinitis    • Anxiety disorder 4/27/2020   • Arthritis    • Asymmetrical sensorineural hearing loss 6/28/2017   • Atherosclerosis of native artery of both lower extremities with intermittent claudication (HCC) 7/18/2019   • Avascular necrosis of femoral head, left (HCC) 07/11/2020    right hip after surgery   • Carotid stenosis    • Chronic mucoid otitis media    • Chronic rhinitis    • Coronary artery disease     HEART BYPASS 2004   • Crohn's disease of large intestine with other  complication ([facility]) 7/30/2020    Chronic diarrhea Colonoscopy July 2020 revealed mild patchy scattered hemosiderin staining with inflammation more so in rectosigmoid area.  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   • Iron deficiency anemia 7/14/2020   • Mixed hearing loss of left ear    • PAD (peripheral artery disease) ([facility]) 8/11/2020   • Perianal abscess    • Pernicious anemia 08/17/2020    took shots but never diagnosed with b12 deficiency   • Personal history of alcoholism ([facility]) 08/11/2020    quit drinking in 2013   • Prostatic hypertrophy 8/11/2020   • Sensorineural hearing loss    • Sepsis with acute renal failure ([facility]) 9/15/2020   • Shortness of breath 5/27/2021   • Tinnitus    • Ventricular tachycardia, nonsustained ([facility]) 7/14/2020   • Weight loss 7/11/2020       Oncology History:  Oncology/Hematology History   CLL (chronic lymphocytic leukemia) ([facility])   7/20/2021 Initial Diagnosis    CLL (chronic lymphocytic leukemia) (CMS/[facility])     7/20/2021 Cancer Staged    Staging form: Chronic Lymphocytic Leukemia / Small Lymphocytic Lymphoma, AJCC 8th Edition  - Clinical stage from 7/20/2021: Lymphocytosis: Absent, Adenopathy: Absent, Organomegaly: Absent, Anemia: Present, Thrombocytopenia: Absent - Signed by Goldberg, Amit, MD on 7/20/2021         Past Surgical History:  Past Surgical History:   Procedure Laterality Date   • ARTERY SURGERY  2021    right side on neck   • CAROTID ENDARTERECTOMY Right 5/10/2021    Procedure: RIGHT CAROTID ENDARTERECTOMY WITH EEG;  Surgeon: Gil Pineda DO;  Location: [place] [address];  Service: Vascular;  Laterality: Right;   •  COLONOSCOPY N/A 7/2/2020    Procedure: COLONOSCOPY WITH ANESTHESIA;  Surgeon: Adrien Brewster MD;  Location: [facility] ENDOSCOPY;  Service: Gastroenterology;  Laterality: N/A;  pre op: diarrhea  post op: polyps  PCP: Joe Velasco MD   • COLONOSCOPY N/A 10/13/2020    Procedure: COLONOSCOPY WITH ANESTHESIA;  Surgeon: Adrien Brewster MD;  Location: [facility] ENDOSCOPY;  Service: Gastroenterology;  Laterality: N/A;  Pre: Chronic Diarrhea, Crohn's  Post: AVM  Dr. Neftali Velasco  CO2 Inflation Used   • CORONARY ARTERY BYPASS GRAFT  2003    x3   • ENDOSCOPY N/A 11/2/2021    Procedure: ESOPHAGOGASTRODUODENOSCOPY WITH ANESTHESIA;  Surgeon: Bridger Bell MD;  Location: [facility] ENDOSCOPY;  Service: Gastroenterology;  Laterality: N/A;  pre anemia;gi bleed  post  gi bleed;schatski ring  Dr. ERIC Velasco   • EYE SURGERY Bilateral     catorac   • INCISION AND DRAINAGE PERIRECTAL ABSCESS N/A 3/3/2017    Procedure: INCISION AND DRAINAGE OF JEET ANAL ABSCESS;  Surgeon: Lynette Smith MD;  Location: [facility] OR;  Service:    • MYRINGOTOMY W/ TUBES Left 04/17/2017    06/10/2016   • TONSILLECTOMY     • TOTAL HIP ARTHROPLASTY Right 2006      ___________________________________________________________________________________________________________________________________________________  Medications:   Outpatient Encounter Medications as of 11/11/2021   Medication Sig Dispense Refill   • albuterol sulfate  (90 Base) MCG/ACT inhaler USE 2 INHALATIONS FOUR TIMES A DAY AS NEEDED 20.1 g 3   • ALPRAZolam (Xanax) 0.25 MG tablet Take 1 tablet by mouth At Night As Needed for Anxiety. 30 tablet 3   • amLODIPine (NORVASC) 10 MG tablet TAKE 1 TABLET DAILY 90 tablet 3   • Apriso 0.375 g 24 hr capsule TAKE 4 CAPSULES DAILY 360 capsule 0   • ASPIRIN 81 PO Take  by mouth Daily.     • atorvastatin (LIPITOR) 10 MG tablet Take 1 tablet by mouth Daily. 90 tablet 3   • azelastine (ASTELIN) 0.1 % nasal spray 1 spray into the nostril(s) as directed  by provider 2 (Two) Times a Day. Use in each nostril as directed 90 mL 3   • cetirizine (zyrTEC) 10 MG tablet Take 1 tablet by mouth Daily.     • cholestyramine (QUESTRAN) 4 g packet TAKE 1 PACKET BY MOUTH DAILY 30 each 5   • cloNIDine (CATAPRES) 0.2 MG tablet TAKE 3 TABLETS DAILY 270 tablet 3   • clopidogrel (PLAVIX) 75 MG tablet Take 75 mg by mouth Daily.     • Copper Gluconate 2 MG tablet Take 2 mg by mouth Daily. 30 tablet 6   • diphenhydrAMINE (Benadryl Allergy) 25 mg capsule Take 25 mg by mouth Every 6 (Six) Hours As Needed for Itching.     • fluticasone (FLONASE) 50 MCG/ACT nasal spray 2 sprays into the nostril(s) as directed by provider Daily As Needed for Rhinitis.     • furosemide (Lasix) 20 MG tablet Take 1 tablet by mouth Daily. 90 tablet 3   • hydrALAZINE (APRESOLINE) 25 MG tablet Take 1 tablet by mouth 3 (Three) Times a Day. 90 tablet 5   • levothyroxine (Synthroid) 75 MCG tablet Take 1 tablet by mouth Daily. 90 tablet 3   • magnesium chloride ER 64 MG DR tablet Take 143 mg by mouth Daily.     • Melatonin 10 MG capsule Take 2 capsules by mouth Every Night.     • metoprolol succinate XL (TOPROL-XL) 25 MG 24 hr tablet TAKE 1 TABLET DAILY 90 tablet 0   • Multiple Vitamins-Minerals (PRESERVISION/LUTEIN) capsule Take 1 capsule by mouth 2 (two) times a day.     • omeprazole (priLOSEC) 20 MG capsule Take 20 mg by mouth Daily.     • potassium chloride 10 MEQ CR tablet Take 1 tablet by mouth 2 (Two) Times a Day. 180 tablet 3   • sodium bicarbonate 650 MG tablet Take 0.5 tablets by mouth 2 (Two) Times a Day. 180 tablet 3   • valsartan (DIOVAN) 160 MG tablet Take 160 mg by mouth Daily.     • vitamin D (ERGOCALCIFEROL) 1.25 MG (96677 UT) capsule capsule Take 1 capsule by mouth 1 (One) Time Per Week. 15 capsule 3   • [DISCONTINUED] aspirin (ASPIR) 81 MG EC tablet Take 81 mg by mouth Daily.     • [DISCONTINUED] clopidogrel (Plavix) 75 MG tablet Take 1 tablet by mouth Daily. 90 tablet 3   • [DISCONTINUED]  fexofenadine (ALLEGRA) 180 MG tablet Take 180 mg by mouth Daily.     • [DISCONTINUED] metoprolol succinate XL (TOPROL-XL) 25 MG 24 hr tablet Take 1 tablet by mouth Daily. 90 tablet 3   • [DISCONTINUED] omeprazole (priLOSEC) 20 MG capsule Take 1 capsule by mouth Daily. 90 capsule 3   • [DISCONTINUED] pantoprazole (PROTONIX) 40 MG EC tablet Take 1 tablet by mouth 2 (Two) Times a Day Before Meals. 60 tablet 0   • [DISCONTINUED] sodium bicarbonate 650 MG tablet Take 1 tablet by mouth 2 (Two) Times a Day. (Patient taking differently: Take 650 mg by mouth 3 (Three) Times a Day.) 180 tablet 3   • [DISCONTINUED] valsartan (DIOVAN) 160 MG tablet Take 160 mg by mouth Daily.     • [] magnesium oxide (MAG-OX) tablet 800 mg      • [DISCONTINUED] acetaminophen (TYLENOL) 160 MG/5ML solution 650 mg      • [DISCONTINUED] acetaminophen (TYLENOL) suppository 650 mg      • [DISCONTINUED] acetaminophen (TYLENOL) tablet 650 mg      • [DISCONTINUED] ALPRAZolam (XANAX) tablet 0.25 mg      • [DISCONTINUED] amLODIPine (NORVASC) tablet 10 mg      • [DISCONTINUED] azelastine (ASTELIN) nasal spray 1 spray      • [DISCONTINUED] cloNIDine (CATAPRES) tablet 0.2 mg      • [DISCONTINUED] diphenhydrAMINE (BENADRYL) capsule 25 mg      • [DISCONTINUED] First Mouthwash (Magic Mouthwash) 5 mL      • [DISCONTINUED] HYDROmorphone (DILAUDID) injection 1 mg      • [DISCONTINUED] ipratropium-albuterol (DUO-NEB) nebulizer solution 3 mL      • [DISCONTINUED] labetalol (NORMODYNE,TRANDATE) injection 20 mg      • [DISCONTINUED] lactated ringers infusion      • [DISCONTINUED] levothyroxine (SYNTHROID, LEVOTHROID) tablet 75 mcg      • [DISCONTINUED] mesalamine (APRISO) 24 hr capsule 1.5 g      • [DISCONTINUED] metoprolol succinate XL (TOPROL-XL) 24 hr tablet 25 mg      • [DISCONTINUED] ondansetron (ZOFRAN) injection 4 mg      • [DISCONTINUED] ondansetron (ZOFRAN) tablet 4 mg      • [DISCONTINUED] pantoprazole (PROTONIX) EC tablet 40 mg      • [DISCONTINUED]  Pharmacy Consult      • [DISCONTINUED] polyethylene glycol (MIRALAX) packet 17 g      • [DISCONTINUED] potassium chloride (MICRO-K) CR capsule 40 mEq      • [DISCONTINUED] sodium bicarbonate tablet 325 mg      • [DISCONTINUED] sodium chloride 0.9 % flush 10 mL      • [DISCONTINUED] sodium chloride 0.9 % flush 10 mL      • [DISCONTINUED] sodium chloride 0.9 % flush 10 mL      • [DISCONTINUED] sodium chloride 0.9 % infusion        No facility-administered encounter medications on file as of 2021.     ___________________________________________________________________________________________________________________________________________________  Allergies:   Allergies   Allergen Reactions   • Ondansetron Anaphylaxis and GI Intolerance   • Lortab [Hydrocodone-Acetaminophen] Other (See Comments) and Hallucinations     CLOSTROPHOBIC   • Allopurinol Other (See Comments)     Pain on right side       Social/Family History:   Family History   Problem Relation Age of Onset   • No Known Problems Mother    • No Known Problems Father    • No Known Problems Daughter    • Colon cancer Neg Hx    • Colon polyps Neg Hx         /Single/:      Social History     Tobacco Use   • Smoking status: Former Smoker     Packs/day: 0.50     Years: 25.00     Pack years: 12.50     Types: Cigarettes     Start date:      Quit date: 10/13/2013     Years since quittin.0   • Smokeless tobacco: Never Used   • Tobacco comment: quit    Vaping Use   • Vaping Use: Never used   Substance Use Topics   • Alcohol use: Not Currently   • Drug use: No        Occupation: Retired equipment rental, 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 2021     Review of Systems  "  Constitutional: Negative.  Negative for activity change, appetite change, chills, fatigue, fever, unexpected weight gain and unexpected weight loss.   HENT: Positive for dental problem and hearing loss. Negative for congestion, ear pain, mouth sores, nosebleeds, sore throat, swollen glands and trouble swallowing.         Dentures   Eyes: Negative.  Negative for blurred vision, double vision, photophobia and pain.   Respiratory: Negative for apnea, cough, chest tightness, shortness of breath and wheezing.         SOB resolved after the transfusion on 6.29.21    Had a cough in the end of July 2021 but mostly resolved   Cardiovascular: Negative for chest pain, palpitations and leg swelling.        Left foot will occasionally swell which resolves with a \"water pill\"   Gastrointestinal: Negative.  Negative for abdominal distention, abdominal pain, anal bleeding, blood in stool, constipation, diarrhea, nausea, vomiting and GERD.   Endocrine: Negative for cold intolerance and heat intolerance.   Genitourinary: Negative.  Negative for breast discharge, dysuria, frequency and hematuria.   Musculoskeletal: Positive for arthralgias. Negative for back pain, gait problem, myalgias and neck stiffness.   Skin: Positive for bruise. Negative for color change, pallor, rash and skin lesions.   Allergic/Immunologic: Positive for environmental allergies. Negative for immunocompromised state.   Neurological: Negative.  Negative for dizziness, syncope, weakness and light-headedness.   Hematological: Negative for adenopathy. Bruises/bleeds easily.   Psychiatric/Behavioral: Negative.  Negative for suicidal ideas and depressed mood. The patient is not nervous/anxious.      ___________________________________________________________________________________________________________________________________________________  I have reexamined the patient and the results are consistent with the previously documented exam. Amit Goldberg, MD " "    Physical Examination:   Vitals:    11/11/21 0950   BP: 126/66   Pulse: 71   Resp: 16   Temp: 96.8 °F (36 °C)   SpO2: 99%    Body surface area is 1.96 meters squared.   Physical Exam  Constitutional:       Appearance: Normal appearance. He is well-developed, well-groomed and normal weight.   HENT:      Head: Normocephalic and atraumatic.      Nose: Nose normal.      Mouth/Throat:      Mouth: Mucous membranes are dry.   Eyes:      Pupils: Pupils are equal, round, and reactive to light.      Comments: + conjunctival palor   Cardiovascular:      Rate and Rhythm: Normal rate and regular rhythm.      Pulses: Normal pulses.      Heart sounds: Murmur heard.    Systolic murmur is present with a grade of 3/6.      Pulmonary:      Effort: Pulmonary effort is normal.      Breath sounds: Normal breath sounds and air entry.   Chest:   Breasts:      Right: No axillary adenopathy or supraclavicular adenopathy.      Left: No axillary adenopathy or supraclavicular adenopathy.        Comments: Bilateral mild gynecomastia  Abdominal:      General: Abdomen is flat. Bowel sounds are normal.      Palpations: Abdomen is soft. There is no hepatomegaly or splenomegaly.      Comments: Minimal \"frog belly\" with no palpable HSM, soft non-tender   Musculoskeletal:         General: Normal range of motion.      Cervical back: Neck supple.      Right lower leg: Normal. No edema.      Left lower leg: Normal. No edema.   Lymphadenopathy:      Head:      Right side of head: No submental, submandibular or occipital adenopathy.      Left side of head: No submental, submandibular or occipital adenopathy.      Cervical:      Right cervical: No superficial cervical adenopathy.     Left cervical: No superficial cervical adenopathy.      Upper Body:      Right upper body: No supraclavicular or axillary adenopathy.      Left upper body: No supraclavicular or axillary adenopathy.      Lower Body: No right inguinal adenopathy. No left inguinal adenopathy. "   Skin:     General: Skin is warm and dry.      Comments: Frail skin with some noted ecchymoses on the UE's bilaterally   Neurological:      General: No focal deficit present.      Mental Status: He is alert and oriented to person, place, and time.   Psychiatric:         Attention and Perception: Attention and perception normal.         Mood and Affect: Mood normal.         Speech: Speech normal.         Behavior: Behavior normal.         Thought Content: Thought content normal.         Cognition and Memory: Cognition and memory normal.         Judgment: Judgment normal.       ___________________________________________________________________________________________________________________________________________________  Labs/X-ray results:     Lab Results - Last 18 Months   Lab Units 11/11/21  1010 11/05/21  1113 11/03/21  0537 11/02/21  0600 11/01/21  0601 10/31/21  0520 10/29/21  1614 10/29/21  0502 10/28/21  1750 10/28/21  1750 10/01/21  0852 10/01/21  0852 09/01/21  1323 09/01/21  1323 08/10/21  1252 08/10/21  1252 08/03/21  1021 08/03/21  1021 08/11/20  0936 07/22/20  1526   HEMOGLOBIN g/dL 8.9* 7.8* 7.9*  7.9* 8.6* 10.1* 8.7*   < > 7.5*   < > 8.0*   < > 10.6*   < > 10.6*   < > 11.4*   < > 11.6*   < > 9.9*   HEMATOCRIT % 28.3* 24.8* 24.8*  24.8* 26.2* 31.5* 26.7*   < > 22.6*   < > 25.5*   < > 32.9*   < > 32.4*   < > 35.1*   < > 38.1   < > 31.4*   MCV fL 98.6* 97.3* 96.2  --   --   --   --  94.6  --  98.1*  --  94.3   < > 91.5   < > 88.6   < > 91.8   < > 104.0*   WBC 10*3/mm3 9.23 7.41 5.61  --   --   --   --  4.64  --  16.97*  --  6.97   < > 6.78   < > 5.23   < > 6.90   < > 10.84*   RDW % 15.6* 16.0* 15.9*  --   --   --   --  15.3  --  16.4*  --  15.9*   < > 19.8*   < > 18.4*   < > 18.8*   < > 13.2   MPV fL 9.6 9.7 10.1  --   --   --   --  9.7  --  9.4  --  9.5   < > 10.0   < > 10.2   < > 10.5   < >  --    PLATELETS 10*3/mm3 184 147 121*  --   --   --   --  151  --  348  --  123*   < > 122*   < > 93*   < >  134*   < > 263   IMM GRAN % % 0.7* 1.3*  --   --   --   --   --   --   --   --   --  0.6*  --  0.6*  --  0.4  --  0.4   < >  --    NEUTROS ABS 10*3/mm3 6.67 5.37  --   --   --   --   --  4.04  --  13.71*  --  4.48  --  4.18   < > 2.67   < > 3.90   < > 7.71*   LYMPHS ABS 10*3/mm3 1.52 1.09  --   --   --   --   --   --   --   --   --  1.58  --  1.48  --  1.68  --  1.99   < > CANCELED  1.34   MONOS ABS 10*3/mm3 0.62 0.55  --   --   --   --   --   --   --   --   --  0.67  --  0.84  --  0.50  --  0.57   < > 1.34*   EOS ABS 10*3/mm3 0.29 0.25  --   --   --   --   --   --   --   --   --  0.14  --  0.17  --  0.33  --  0.34   < > CANCELED   EOSINOPHIL ABS 10*3/mm3  --   --   --   --   --   --   --   --   --   --   --   --   --   --   --   --   --   --   --  0.44*   EOSINOPHIL % %  --   --   --   --   --   --   --   --   --   --   --   --   --   --   --   --   --   --   --  4.1   BASOS ABS 10*3/mm3 0.07 0.05  --   --   --   --   --   --   --   --   --  0.06  --  0.07  --  0.03  --  0.07   < > CANCELED   IMMATURE GRANS (ABS) 10*3/mm3 0.06* 0.10*  --   --   --   --   --   --   --   --   --  0.04  --  0.04  --  0.02  --  0.03   < >  --    NRBC /100 WBC 0.0 0.0  --   --   --   --   --  1.0*  --   --   --  0.0  --  0.0  --  0.0   < > 0.0   < > 0.0   NEUTROPHIL % %  --   --   --   --   --   --   --  74.0  --  56.6  --   --   --   --   --   --   --   --   --  71.1   MONOCYTES % %  --   --   --   --   --   --   --  7.0  --  6.1  --   --   --   --   --   --   --   --   --  12.4*   ANISOCYTOSIS   --   --   --   --   --   --   --   --   --  Slight/1+  --   --   --   --   --   --   --   --   --   --     < > = values in this interval not displayed.       Lab Results - Last 18 Months   Lab Units 11/05/21  1113 11/03/21  0537 11/02/21  0600 11/01/21  0601 10/31/21  0520 10/30/21  0532   GLUCOSE mg/dL 109* 101* 96 100* 94 113*   SODIUM mmol/L 140 139 142 141 143 144   POTASSIUM mmol/L 4.6 3.9 3.5 3.7 3.1* 3.2*   CO2 mmol/L 24.0 24.0 24.0  23.0 23.0 24.0   CHLORIDE mmol/L 109* 108* 108* 108* 111* 111*   ANION GAP mmol/L 7.0 7.0 10.0 10.0 9.0 9.0   CREATININE mg/dL 2.58* 2.38* 2.46* 2.67* 2.44* 3.09*   BUN mg/dL 31* 21 27* 28* 32* 48*   BUN / CREAT RATIO  12.0 8.8 11.0 10.5 13.1 15.5   CALCIUM mg/dL 8.2* 8.0* 8.1* 8.0* 7.3* 7.7*   EGFR IF NONAFRICN AM mL/min/1.73 25* 27* 26* 24* 26* 20*   ALK PHOS U/L 189* 132* 130* 151* 122* 131*   TOTAL PROTEIN g/dL 5.7* 4.9* 5.3* 6.0 5.1* 4.7*   ALT (SGPT) U/L 16 11 9 9 8 8   AST (SGOT) U/L 28 24 20 22 21 21   BILIRUBIN mg/dL <0.2 0.2 0.2 0.3 0.2 0.3   ALBUMIN g/dL 3.30* 2.50* 3.00* 3.40* 2.90* 3.00*   GLOBULIN gm/dL 2.4 2.4 2.3 2.6 2.2 1.7       Lab Results - Last 18 Months   Lab Units 10/29/21  1201 10/28/21  1750 07/27/21  1245 07/20/21  1029 06/25/21  1155 03/11/21  0951 09/15/20  1934 07/11/20  1114 07/02/20  0901   M-SPIKE g/dL  --   --   --   --  Not Observed  --   --   --   --    URIC ACID mg/dL 9.2*  --  9.5* 9.2*  --  8.7*  --  9.9*  --    LDH U/L  --  166  --   --  181  --  166  --   --    REFERENCE LAB REPORT   --   --   --   --   --   --   --   --  See Attached Report       Lab Results - Last 18 Months   Lab Units 11/05/21  1113 11/03/21  0537 10/29/21  0502 10/28/21  1750 07/27/21  1245 07/20/21  1029 07/06/21  1320 06/25/21  1155 06/25/21  1155 06/21/21  1206 05/05/21  1014 03/11/21  0951 09/16/20  0941 07/22/20  1526 07/13/20  0538 07/11/20  1114   IRON mcg/dL 75 73  --  60 150 49* 93   < > 25*  --   --   --    < >  --    < >  --    TIBC mcg/dL 237* 188*  --  343 325 337 370   < > 420  --   --   --    < > 281   < >  --    IRON SATURATION % 32 39  --  18* 46 15* 25   < > 6*  --   --   --    < > 22   < >  --    FERRITIN ng/mL 366.50 458.00*  --  48.07 159.60 159.20 735.80*   < > 31.91  --   --   --   --   --   --   --    TSH uIU/mL  --   --  0.898  --   --   --   --   --  3.150 4.170 6.550* 8.010*  --   --   --  4.780*   FOLATE ng/mL  --   --   --   --   --   --   --   --  13.00  --   --   --   --   7.14  --   --     < > = values in this interval not displayed.     ____________________________________________________________________________  Radiology: CT Soft Tissue Neck Without Contrast    Result Date: 10/28/2021  Narrative: EXAMINATION:  CT SOFT TISSUE NECK WO CONTRAST-  10/28/2021 5:06 PM CDT  HISTORY: Pain, difficulty swallowing (renal failure)  COMPARISON: MR angiogram of the neck dated 4/21/2021  TECHNIQUE: Radiation dose equals  mGy-cm.  Automated exposure control dose reduction technique was implemented.  Thin section axial imaging was obtained without intravenous contrast. 2-D sagittal and coronal reconstruction images were generated.  FINDINGS:  Evaluation of the soft tissue neck for masses and lymphadenopathy is suboptimal without the benefit of iodinated contrast.  Aortic calcifications identified. The right-sided cardiophrenic directly changes was multiple clips identified.  There is atrophy central and cortical his. No discrete brain lesion observed.  The nasopharynx is imaged symmetrically without masses.  There are no oral cavity masses identified.  The soft palate and uvula is not thickened.  The epiglottis is not swollen enlarged. The vallecula and piriform sinuses are imaged symmetrically without mass effect.  The supraglottic airway is widely patent without compromise. There are no laryngeal masses.  The airway is widely patent.  No definite esophageal abnormality observed.  The prevertebral soft tissues are normal without edematous changes.  Diffuse spondylosis changes observed in the cervical spine.  Submandibular glands are imaged symmetrically. Parotid glands are also symmetric without masses.  There are no thyroid gland nodules.  There are no enlarged cervical lymph nodes identified.  Emphysematous changes noted in the lung apices.      Impression: 1. No nonenhanced CT evidence of neck mass or definite lymphadenopathy. This report was finalized on 10/28/2021 17:30 by Dr. Multani  MD Matthew.    XR Chest 1 View    Result Date: 10/28/2021  Narrative: EXAMINATION:  XR CHEST 1 VW-  10/28/2021 4:45 PM CDT  HISTORY: Weak/Dizzy/AMS triage protocol  COMPARISON: 9/1/2021 and 5/7/2021.  FINDINGS:  There is no focal infiltrate or effusion. There has been prior median sternotomy and heart bypass surgery. Heart size is upper limits of normal. There is lucency and sclerosis in the right humeral head that may represent avascular necrosis. No other acute bony abnormality is seen.      Impression: 1. No acute appearing infiltrate or effusion. 2. Heart size upper limits of normal. Prior heart bypass surgery. 3. Probable avascular necrosis right humeral head.   This report was finalized on 10/28/2021 18:01 by Dr. Mikael Gallegos MD.             __________________________________________________________________________________________________________________________________________  Assessment/Plans:      Assessment/Plans:      Date  3/2/2017 4/28/2020  7/2/2020 9/15/20 3.11.21  5/2021 6.25.21  (gave 1 unit PRBC on 6.25.21)  7/6/2021 7.12.21  7.13.21   WBC  9.04  11.64  19.49 21.52 5.06  5.40  5.56  6.03 7.91 5.84   Hb  13.6  13.1  11.1 11.1 9.7  8.3  6.9  8.6 10.2 9.2   MCV  95.4  97.9  98.3  91.5 99.0  97.1  89.1  89.7 92.1 90.4   Plt  122  151  291  190 103  113  143  139 158 129   ANC  6.43    16.51  17.19    3.49  3.24    4.42     ALC  1.31    1.45  1.94    1.15  1.53    2.37     AMC  0.97   0.91  2.23 (!!!)     0.58  0.60    0.80     AEC  0.27   0.02  0   0.11  0.12    0.21     ABC  0.03   0.07  0.07   0.05  0.05    0.06     Ferritin             31.91 735.80 (after injectafer)       Iron             25 (L) 93       Iron SAT             6% (L) 25%       Transferrin             282 248       TIBC             420 370       Hapto             159         LDH             181         NOÉ             NEG         Mahesh              46 (L)         Zinc             146 (H)         Retic              2.34          B12              502         Folate              13         Hep C       NEG               Hepatitis B       NEG               HIV                       Creatinine  0.79  1.30 4.38  2.92      2.36 2.31       Glucose  103  125 179  104      123 109       ALT  19  73  48 19      9 14       AST  28  63 30 18      15 18       Alk phos  120  114  228 197      206 260       EGFR 97  54 13 (!!!) 21       28       CRP      23.95       0.32         ESR             18         Uric acid          8.7             TSH          8.010    3.50         T4              1.11         PT/PTT           15.8/36           RF             <10         MICHAEL             NEG         SPEP             No M spike, WNL         UPEP             WNL, NO M spike         STR             35.4 (H)         EPO             18.3 (WNL)            Date 7.20.21 7.27.21 8.3.21 9/1/2021  10/1/2021 10.28.21  10.29.21  10.30.21  (2/p 2 u prbc)  11.2.21  11.3.21   WBC 10.99  10.43 6.90  6.78  6.97  16.97  4.64         Hb 11.3  12.6 11.6  10.6  10.6  8.0  7.5  7.9   8.6  7.9   MCV 93.7  91.6 91.8  91.5  94.3  98.1  94.6         Plt 173  157 134  122  123  348  151         ANC 7.39  5.43 3.90  4.18  4.48  13.71  4.04         ALC 2.26  3.53 1.99  1.48  1.58  2.05  0.23         AMC 1.03  0.94 0.57  0.84  0.67  1.04  0.32         AEC 0.06 0.33 0.34  0.17  0.14             ABC 0.06 0.05 0.07  0.07  0.06             nRBC             1.0         Ferritin 159.20 159.60        48.07        458   Iron 49 150        60           Iron SAT 15%  46%        18%           Transferrin 226  218        230           TIBC 337  325        343           Hapto                       LDH            166           NOÉ                       Mahesh                       Zinc                       Retic            6.88%           B12                       Folate                       Hep C                       Hepatitis B                       HIV                       Creatinine 2.44      2.44     4.15  4.24  3.09   2.46  2.38   Glucose 109      103    105  128  113   96  101   ALT 19      16    5  6  8   9  11   AST 16      22    9  14  21   20  24   Alk phos 201      239    169  146  131   130  132   EGFR 26      26    14 (!)  14 (!)  20   26  27   CRP                       ESR                       Uric acid 9.2  9.5                   TSH                       T4                       PT/PTT                       RF                       MICHAEL                       SPEP                       UPEP                       IgG  1522                     IgA  311                     IgM  108                     STR                       EPO                       Beta 2 MG 7.2                     Lactate                       Troponin           0.036           GI Panel           Neg              Date 11.5.21 11.11.21           WBC  9.23           Hb  7.8 8.9           MCV  98.6           Plt  184           ANC             ALC             AMC             AEC             ABC             nRBC             Ferritin  366.5            Iron  75            Iron SAT  32%            Transferrin  159 (L)            TIBC  237 (L)            Hapto             LDH             NOÉ             Mahesh             Zinc             Retic             B12             Folate             Hep C             Hepatitis B             HIV             Creatinine  2.58            Glucose  109            ALT  16            AST  28            Alk phos  189            EGFR  25            CRP             ESR             Uric acid             TSH             T4             PT/PTT             RF             MICHAEL             SPEP             UPEP             IgG             IgA             IgM             STR             EPO             Beta 2 MG  11.8            Lactate             Troponin             GI Panel                  ** Anemia, likely ACD and WALE, no evidence of AIHA  ** Admitted 10/2021 for massive GI Bleed --> resolved  **Bone marrow biopsy  showing 2% monoclonal B-cell kappa population consistent with CLL/SLL  ** anemia may be secondary to underlying CLL (unable to stage as finding on marrow but no other signs of CLL including no elevation in lymphocytes and therefore cannot state that anemia is due to CLL   **iron deficiency and renal insufficiency likely leading to anemia of chronic disease  ** Renal Insufficiency, acutely worse on admission on 10/2021 --> IMPROVING  - chronic renal insufficiency can lead to severe anemia  - anemia workup documented in chart above   ? Peripheral smear: Severe normochromic normocytic anemia.  Rare circulating schistocytes. Normal white blood cell count with normal differential and morphology. No circulating blasts. Adequate platelets with normal morphology.   ? Copper abnormally low at 46, on  copper supplementation   ? Zinc elevated at 146 on day of initial consultation. Discussed foods and medications to be avoided  ? sTf/log Ferritin 1.01 likely   be indicative of combination of iron deficiency and ACD   ? Beta 2 microglobulin excessively elevated on 7.20.21. Increased Beta 2 microglobulin can be seen in renal  Failure.  Increased significantly on testing on 11/5/2021 which likely reflects the recent acute on chronic renal failure immunoglobulin level WNL     · Anemia should  be treated with transfusions based on symptoms or usually a hb <7   ·  ABILIO replacement requires ferritin >100 and iron saturation >20% for Hb <10.   ? Patient is receiving iron and EPO as OP      -In the history of a patient with Crohn's disease, and found to have AVM’s as well likely the source of the blood loss  S/P EGD on 11.3.21:   · Benign-appearing esophageal ring.  · Z-line irregular.  · Small hiatal hernia.  · Erythematous mucosa in the stomach.  · Non-bleeding erosive gastropathy.  · Two angioectasias in the duodenum. Treated with bipolar cautery.     Bone marrow biopsy done 7/12/2021:  · with a flow cytometry showing a 2% monoclonal  B-cell kappa population consistent with CLL/SLL.    · CLL panel:  ? Negative for a t (11;14)/CCND1-IGH rearrangement  ? Negative for deletion of MARIA on the long arm of chromosome 11 q. 22  ? Negative for trisomy 12  ? Negative for deletion of DLEU1, DLEU2 on the long arm of chromosome 13 q. 14 and monosomy 13  ? Negative for deletion T p53 in the short arm of chromosome 17 P 13  · Cytogenetics showed normal male karyotype  · Bone marrow biopsy and aspirate show involvement by chronic lymphocytic leukemia/small lymphocytic lymphoma and up to 5% of cellularity, mild hypercellular marrow with maturing trilineage hematopoiesis, erythroid hyperplasia and a mild atypical small lymphocytic infiltrate with a dominant nodular pattern  · Storage iron is present     · Currently does not have indication for treatment of the CLL     6.25.21 1 unit PRBC   6.29.21 750 mg Injectafer   7.6.21 Started EPO supplementation   10/29/21 2 U PRBC       - tranfuse PRBC for symptomatic anemia or Hb <8 (due to cardiac issues).   - admitted 10/2021 with massive GI bleed.    - discussed with patient on 11.11.21 that can restart EPO 40,000 units weekly as long as Hb <10, ferritin >100 and iron saturation >20%.  If the iron saturation or ferritin are not sufficient can receive 1 treatment of injactafer 750 mg      ** Infection status:   · Covid vaccination status MODERNA with the second dose in 4/2021     **Metabolic / Renal:   · Chronic renal failure Grade 4  · Acute on chronic renal failure during admission on 10/2021 --> prerenal and improving    -Uric acid noted to be elevate,  he has an allopurinol allergy      ** Endocrine:   · hypothyroidism being treated by PCP and can certainly affect anemia     ** GI:   -IBD SGI diagnostic consistent with Crohn's disease with chronic diarrhea can lead to anemia.  Being followed by Dr. Brewster with stability and Crohn's disease  -History of chronic alcoholism can most certainly be an explanation for  thrombocytopenia due to hepatosplenomegaly and hypersplenism.  For better evaluation would proceed with a ultrasound of the abdomen, 7.12.21: 1.  Cirrhotic morphology/appearance of the liver. No solid liver lesion. No evidence of ascites. 2.  Spleen is within normal limits in size measuring 12.0 x 4.8 x 1.0 cm.3.  Cholelithiasis without evidence of cholecystitis. This may require GI evaluation  -Noted to have an elevated alkaline phosphatase and sometimes elevated ALT and AST.  May be related to either alcoholism and or Crohn's disease   - noted to have AVM’s in colon.  - with EtOH history/cirrhosis     S/P EGD on 11.3.21:   · Benign-appearing esophageal ring.  · Z-line irregular.  · Small hiatal hernia.  · Erythematous mucosa in the stomach.  · Non-bleeding erosive gastropathy.  · Two angioectasias in the duodenum. Treated with bipolar cautery.     ** Pulmonary:   · Current smoking status former smoker, quit in 2013  -History of pulmonary hypertension being followed by pulmonary Dr. Omalley     ** Cardiology:   -Ventricular tachycardia, CAD, atherosclerosis with intermittent claudication, carotid stenosis, heart disease, hyperlipidemia, hypertension all to be followed up by PCP/cardiology as needed     ** Skin / Rash:   · ecchymoses that may be due to age or blood thinner or both        ** Rheumatology/Musculoskeletal  -History of avascular necrosis of the femoral head after surgery on the hip  - arthritis can also lead to anemia     ** Neurologic:   · Hearing loss and tinnitus to be followed up by PCP     ** Health Maintenance  -  Last colonoscopy 7/2/2020: 1.  Large intestine, cecum, biopsy: A.  Fragments of benign colonic mucosa demonstrating minimal active inflammation, nonspecific. B.  No glandular dysplasia identified. 2.  Large intestine, transverse colon, biopsy:  A.  Fragments of benign colonic mucosa demonstrating mild active inflammation, nonspecific. B.  No glandular dysplasia identified.  3.  Large  intestine, distal transverse colon polyps x2, polypectomy: Fragments of adenomatous polyps.   4.  Large intestine, colon at 35 cm, biopsy: A.  Fragments of benign colonic mucosa demonstrating mild active inflammation, nonspecific. B.  No glandular dysplasia identified. 5.  Large intestine, hepatic flexure, polypectomy: Fragments of adenomatous polyp. 6.  Large intestine, proximal transverse colon, polypectomy: Polypoid fragment of benign colonic mucosa.  Comment: Histologic changes of an adenomatous polyp or hyperplastic polyp are not identified.   7.  Large intestine, rectum, biopsy: A.  Fragments of benign, large intestinal mucosa demonstrating minimal active inflammation, nonspecific. B.  No glandular dysplasia identified.   - S/P EGD on 11.3.21:   · Benign-appearing esophageal ring.  · Z-line irregular.  · Small hiatal hernia.  · Erythematous mucosa in the stomach.  · Non-bleeding erosive gastropathy.  · Two angioectasias in the duodenum. Treated with bipolar cautery.          Advance Care Planning     ACP discussion was held with the patient during this visit. Patient does not have an advance directive, information provided.: FULL CODE and HCP is alex Hugo     - Follow up Telehealth December 2021    Amit Goldberg, MD    Total time spent caring for patient, reviewing lab and radiology information, and explaining the plan of care to the patient was 25  minutes

## 2021-11-03 NOTE — PROGRESS NOTES
Nephrology ([facility]) Progress Note      Patient:  Ricardo Hugo  YOB: 1950  Date of Service: 11/3/2021  MRN: [id number]   Acct: [id number]   Primary Care Physician: Joe Velasco MD  Advance Directive:   Code Status and Medical Interventions:   Ordered at: 10/28/21 1839     Code Status (Patient has no pulse and is not breathing):    CPR (Attempt to Resuscitate)     Medical Interventions (Patient has pulse or is breathing):    Full     Admit Date: 10/28/2021       Hospital Day: 6  Referring Provider: Kendall Bermudez MD      Patient personally seen and examined.  Complete chart including Consults, Notes, Operative Reports, Labs, Cardiology, and Radiology studies reviewed as able.        Subjective:  Ricardo Hugo is a 71 y.o. male for whom we were consulted for evaluation and treatment of acute kidney injury. Baseline chronic kidney disease stage 4 with baseline creatinine approximately 2.5. Follows with STERLING Freeman in our office. History of multiple prior AMAYA that are usually due to volume depletion from chronic nausea and diarrhea, which in turn is secondary to Crohn's disease. Other history includes hypertension, CLL, prior ETOH abuse, and CAD with prior CABG.  Patient presented to ER with complaint of over a week of nausea/vomiting, poor PO intake, black watery diarrhea. Also complained of severe mouth/tongue pain. Had taken Aleve a few times this week to try and alleviate the pain. Urine output had been minimal for 24 hours prior to arrival at hospital. Initial labs in ER revealed creatinine 4.15, BUN 71, CO2 9.0, Hgb 8.0.  Started on IV fluids and given 1 unit PRBC. Renal function has improved. Underwent EGD on 11/02.    Today feeling better, looking forward to discharge soon. No new overnight issues.  Urine output nonoliguric    Allergies:  Lortab [hydrocodone-acetaminophen] and Allopurinol    Home Meds:  Medications Prior to Admission    Medication Sig Dispense Refill Last Dose   • albuterol sulfate  (90 Base) MCG/ACT inhaler USE 2 INHALATIONS FOUR TIMES A DAY AS NEEDED 20.1 g 3 Past Month at Unknown time   • ALPRAZolam (Xanax) 0.25 MG tablet Take 1 tablet by mouth At Night As Needed for Anxiety. 30 tablet 3 10/27/2021 at Unknown time   • aspirin (ASPIR) 81 MG EC tablet Take 81 mg by mouth Daily.   10/28/2021 at Unknown time   • azelastine (ASTELIN) 0.1 % nasal spray 1 spray into the nostril(s) as directed by provider 2 (Two) Times a Day. Use in each nostril as directed 90 mL 3 Past Month at Unknown time   • fluticasone (FLONASE) 50 MCG/ACT nasal spray 2 sprays into the nostril(s) as directed by provider Daily As Needed for Rhinitis.   Past Week at Unknown time   • vitamin D (ERGOCALCIFEROL) 1.25 MG (21438 UT) capsule capsule Take 1 capsule by mouth 1 (One) Time Per Week. 15 capsule 3 Past Week at Unknown time   • amLODIPine (NORVASC) 10 MG tablet TAKE 1 TABLET DAILY 90 tablet 3 10/28/2021   • atorvastatin (LIPITOR) 10 MG tablet Take 1 tablet by mouth Daily. 90 tablet 3 10/28/2021   • cholestyramine (QUESTRAN) 4 g packet TAKE 1 PACKET BY MOUTH DAILY 30 each 5 10/28/2021   • cloNIDine (CATAPRES) 0.2 MG tablet TAKE 3 TABLETS DAILY 270 tablet 3 10/28/2021   • clopidogrel (Plavix) 75 MG tablet Take 1 tablet by mouth Daily. 90 tablet 3 10/28/2021   • Copper Gluconate 2 MG tablet Take 2 mg by mouth Daily. 30 tablet 6 10/28/2021   • diphenhydrAMINE (Benadryl Allergy) 25 mg capsule Take 25 mg by mouth Every 6 (Six) Hours As Needed for Itching.   10/27/2021   • fexofenadine (ALLEGRA) 180 MG tablet Take 180 mg by mouth Daily.   10/28/2021   • furosemide (Lasix) 20 MG tablet Take 1 tablet by mouth Daily. 90 tablet 3 10/28/2021   • hydrALAZINE (APRESOLINE) 25 MG tablet Take 1 tablet by mouth 3 (Three) Times a Day. 90 tablet 5 10/28/2021   • levothyroxine (Synthroid) 75 MCG tablet Take 1 tablet by mouth Daily. 90 tablet 3 10/28/2021   • magnesium  chloride ER 64 MG DR tablet Take 143 mg by mouth Daily.   10/28/2021   • Melatonin 10 MG capsule Take 2 capsules by mouth Every Night.   10/28/2021   • metoprolol succinate XL (TOPROL-XL) 25 MG 24 hr tablet Take 1 tablet by mouth Daily. 90 tablet 3 10/28/2021   • Multiple Vitamins-Minerals (PRESERVISION/LUTEIN) capsule Take 1 capsule by mouth 2 (two) times a day.   10/28/2021   • omeprazole (priLOSEC) 20 MG capsule Take 1 capsule by mouth Daily. 90 capsule 3 10/28/2021   • potassium chloride 10 MEQ CR tablet Take 1 tablet by mouth 2 (Two) Times a Day. 180 tablet 3 10/28/2021   • sodium bicarbonate 650 MG tablet Take 1 tablet by mouth 2 (Two) Times a Day. (Patient taking differently: Take 650 mg by mouth 3 (Three) Times a Day.) 180 tablet 3 10/28/2021   • valsartan (DIOVAN) 160 MG tablet Take 160 mg by mouth Daily.   10/28/2021       Medicines:  Current Facility-Administered Medications   Medication Dose Route Frequency Provider Last Rate Last Admin   • acetaminophen (TYLENOL) tablet 650 mg  650 mg Oral Q4H PRN Bridger Bell MD        Or   • acetaminophen (TYLENOL) 160 MG/5ML solution 650 mg  650 mg Oral Q4H PRN Bridger Bell MD        Or   • acetaminophen (TYLENOL) suppository 650 mg  650 mg Rectal Q4H PRN Bridger Bell MD       • ALPRAZolam (XANAX) tablet 0.25 mg  0.25 mg Oral Nightly PRN Bridger Bell MD       • amLODIPine (NORVASC) tablet 10 mg  10 mg Oral Daily Bridger Bell MD   10 mg at 11/03/21 0821   • azelastine (ASTELIN) nasal spray 1 spray  1 spray Each Nare BID Bridger Bell MD   1 spray at 11/03/21 0824   • cloNIDine (CATAPRES) tablet 0.2 mg  0.2 mg Oral TID With Meals Bridger Bell MD   0.2 mg at 11/03/21 0822   • diphenhydrAMINE (BENADRYL) capsule 25 mg  25 mg Oral Q6H PRN Bridger Bell MD       • First Mouthwash (Magic Mouthwash) 5 mL  5 mL Swish & Spit Q4H Bridger Bell MD   5 mL at 11/02/21 0910   • HYDROmorphone (DILAUDID) injection 1 mg  1 mg  Intravenous Q3H PRN Bridger Bell MD       • ipratropium-albuterol (DUO-NEB) nebulizer solution 3 mL  3 mL Nebulization Q6H PRN Bridger Bell MD       • labetalol (NORMODYNE,TRANDATE) injection 20 mg  20 mg Intravenous Once Bridger Bell MD       • lactated ringers infusion  50 mL/hr Intravenous Continuous Bridger Bell MD 50 mL/hr at 11/01/21 1821 50 mL/hr at 11/01/21 1821   • levothyroxine (SYNTHROID, LEVOTHROID) tablet 75 mcg  75 mcg Oral Q AM Bridger Bell MD   75 mcg at 11/03/21 0531   • mesalamine (APRISO) 24 hr capsule 1.5 g  1.5 g Oral Daily Bridger Bell MD   1.5 g at 11/03/21 0822   • metoprolol succinate XL (TOPROL-XL) 24 hr tablet 25 mg  25 mg Oral Daily Bridger Bell MD   25 mg at 11/03/21 0821   • ondansetron (ZOFRAN) tablet 4 mg  4 mg Oral Q6H PRN Bridger Bell MD        Or   • ondansetron (ZOFRAN) injection 4 mg  4 mg Intravenous Q6H PRN Bridger Bell MD       • pantoprazole (PROTONIX) EC tablet 40 mg  40 mg Oral BID AC Dc Issa DO   40 mg at 11/03/21 0821   • Pharmacy Consult   Does not apply Continuous PRN Bridger Bell MD       • polyethylene glycol (MIRALAX) packet 17 g  17 g Oral Daily Bridger Bell MD   17 g at 11/02/21 0906   • potassium chloride (MICRO-K) CR capsule 40 mEq  40 mEq Oral Daily Bridger Bell MD   40 mEq at 11/03/21 0821   • sodium bicarbonate tablet 325 mg  325 mg Oral BID Bridger Bell MD   325 mg at 11/03/21 0821   • sodium chloride 0.9 % flush 10 mL  10 mL Intravenous PRN Bridger Bell MD       • sodium chloride 0.9 % flush 10 mL  10 mL Intravenous Q12H Bridger Bell MD   10 mL at 11/03/21 0824   • sodium chloride 0.9 % flush 10 mL  10 mL Intravenous PRN Bridger Bell MD       • sodium chloride 0.9 % infusion  100 mL/hr Intravenous Continuous Bridger Bell  mL/hr at 11/02/21 1235 Currently Infusing at 11/02/21 1235       Past Medical History:  Past Medical History:   Diagnosis  Date   • 3-vessel CAD 8/11/2020   • Allergic rhinitis    • Anxiety disorder 4/27/2020   • Arthritis    • Asymmetrical sensorineural hearing loss 6/28/2017   • Atherosclerosis of native artery of both lower extremities with intermittent claudication ([facility]) 7/18/2019   • Avascular necrosis of femoral head, left ([facility]) 07/11/2020    right hip after surgery   • Carotid stenosis    • Chronic mucoid otitis media    • Chronic rhinitis    • Coronary artery disease     HEART BYPASS 2004   • Crohn's disease of large intestine with other complication ([facility]) 7/30/2020    Chronic diarrhea Colonoscopy July 2020 revealed mild patchy scattered hemosiderin staining with inflammation more so in rectosigmoid area.  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   • Iron deficiency anemia 7/14/2020   • Mixed hearing loss of left ear    • PAD (peripheral artery disease) ([facility]) 8/11/2020   • Perianal abscess    • Pernicious anemia 08/17/2020    took shots but never diagnosed with b12 deficiency   • Personal history of alcoholism ([facility]) 08/11/2020    quit drinking in 2013   • Prostatic hypertrophy 8/11/2020   • Sensorineural hearing loss    • Sepsis with acute renal failure ([facility]) 9/15/2020   • Shortness of breath 5/27/2021   • Tinnitus    • Ventricular tachycardia, nonsustained ([facility]) 7/14/2020   • Weight loss 7/11/2020       Past Surgical History:  Past Surgical History:   Procedure Laterality Date   • ARTERY SURGERY  2021    right side on neck   • CAROTID ENDARTERECTOMY Right 5/10/2021    Procedure: RIGHT CAROTID ENDARTERECTOMY WITH EEG;  Surgeon: Gil Pineda DO;  Location: [facility] OR ;   Service: Vascular;  Laterality: Right;   • COLONOSCOPY N/A 2020    Procedure: COLONOSCOPY WITH ANESTHESIA;  Surgeon: Adrien Brewster MD;  Location: [facility] ENDOSCOPY;  Service: Gastroenterology;  Laterality: N/A;  pre op: diarrhea  post op: polyps  PCP: oJe Velasco MD   • COLONOSCOPY N/A 10/13/2020    Procedure: COLONOSCOPY WITH ANESTHESIA;  Surgeon: Adrien Brewster MD;  Location: [facility] ENDOSCOPY;  Service: Gastroenterology;  Laterality: N/A;  Pre: Chronic Diarrhea, Crohn's  Post: AVM  Dr. Neftali Velasco  CO2 Inflation Used   • CORONARY ARTERY BYPASS GRAFT  2003    x3   • EYE SURGERY Bilateral     catorac   • INCISION AND DRAINAGE PERIRECTAL ABSCESS N/A 3/3/2017    Procedure: INCISION AND DRAINAGE OF JEET ANAL ABSCESS;  Surgeon: Lynette Smith MD;  Location: [facility] OR;  Service:    • MYRINGOTOMY W/ TUBES Left 2017    06/10/2016   • TONSILLECTOMY     • TOTAL HIP ARTHROPLASTY Right        Family History  Family History   Problem Relation Age of Onset   • No Known Problems Mother    • No Known Problems Father    • No Known Problems Daughter    • Colon cancer Neg Hx    • Colon polyps Neg Hx        Social History  Social History     Socioeconomic History   • Marital status:    Tobacco Use   • Smoking status: Former Smoker     Packs/day: 0.50     Years: 25.00     Pack years: 12.50     Types: Cigarettes     Start date:      Quit date: 10/13/2013     Years since quittin.0   • Smokeless tobacco: Never Used   • Tobacco comment: quit    Vaping Use   • Vaping Use: Never used   Substance and Sexual Activity   • Alcohol use: Not Currently   • Drug use: No   • Sexual activity: Defer         Review of Systems:  History obtained from chart review and the patient  General ROS: No fever or chills  Respiratory ROS: No cough, shortness of breath, wheezing  Cardiovascular ROS: No chest pain or palpitations  Gastrointestinal ROS: No abdominal pain or melena  Genito-Urinary ROS: No dysuria or  hematuria  Psych ROS: No anxiety and depression    Objective:  Patient Vitals for the past 24 hrs:   BP Temp Temp src Pulse Resp SpO2   11/03/21 0752 (!) 192/79 98.4 °F (36.9 °C) Oral 80 18 98 %   11/03/21 0412 140/58 98.6 °F (37 °C) Oral 68 18 96 %   11/02/21 2357 154/64 98.7 °F (37.1 °C) Oral 77 18 97 %   11/02/21 1956 142/50 98.4 °F (36.9 °C) Oral 71 18 97 %   11/02/21 1510 (!) 185/73 98.4 °F (36.9 °C) Oral 74 16 98 %   11/02/21 1325 142/57 -- -- 61 17 95 %   11/02/21 1320 -- -- -- -- 18 --   11/02/21 1315 102/55 -- -- 60 15 95 %   11/02/21 1310 -- -- -- -- 14 --   11/02/21 1305 107/50 -- -- 65 13 95 %   11/02/21 1302 105/50 -- -- 65 16 95 %     No intake or output data in the 24 hours ending 11/03/21 0953  General: awake/alert   Chest:  clear to auscultation bilaterally without respiratory distress  CVS: regular rate and rhythm  Abdominal: soft, nontender, positive bowel sounds  Extremities: no cyanosis or edema  Skin: warm and dry without rash      Labs:  Results from last 7 days   Lab Units 11/03/21  0537 11/02/21  0600 11/01/21  0601 10/29/21  1614 10/29/21  0502 10/28/21  1750 10/28/21  1750   WBC 10*3/mm3 5.61  --   --   --  4.64  --  16.97*   HEMOGLOBIN g/dL 7.9*  7.9* 8.6* 10.1*   < > 7.5*   < > 8.0*   HEMATOCRIT % 24.8*  24.8* 26.2* 31.5*   < > 22.6*   < > 25.5*   PLATELETS 10*3/mm3 121*  --   --   --  151  --  348    < > = values in this interval not displayed.         Results from last 7 days   Lab Units 11/03/21  0537 11/02/21  0600 11/01/21  0601   SODIUM mmol/L 139 142 141   POTASSIUM mmol/L 3.9 3.5 3.7   CHLORIDE mmol/L 108* 108* 108*   CO2 mmol/L 24.0 24.0 23.0   BUN mg/dL 21 27* 28*   CREATININE mg/dL 2.38* 2.46* 2.67*   CALCIUM mg/dL 8.0* 8.1* 8.0*   BILIRUBIN mg/dL 0.2 0.2 0.3   ALK PHOS U/L 132* 130* 151*   ALT (SGPT) U/L 11 9 9   AST (SGOT) U/L 24 20 22   GLUCOSE mg/dL 101* 96 100*       Radiology:   Imaging Results (Last 72 Hours)     ** No results found for the last 72 hours. **           Culture:  No results found for: BLOODCX, URINECX, WOUNDCX, MRSACX, RESPCX, STOOLCX      Assessment   1.  Acute kidney injury, prerenal--resolved  2.  Baseline chronic kidney disease stage 4  3.  Essential hypertension  4.  Crohn's disease, s/p EGD on 11/02  5.  Chronic lymphocytic leukemia  6.  Metabolic acidosis  7.  Anemia of CKD, acute blood loss    Plan:  1.  Encourage PO fluids  2.  OK for discharge from renal standpoint. Follow up in office in 1-2 weeks      Slava Tate, APRN  11/3/2021  09:53 CDT

## 2021-11-04 ENCOUNTER — TRANSITIONAL CARE MANAGEMENT TELEPHONE ENCOUNTER (OUTPATIENT)
Dept: CALL CENTER | Facility: HOSPITAL | Age: 71
End: 2021-11-04

## 2021-11-04 NOTE — OUTREACH NOTE
Call Center TCM Note      Responses   [facility] patient discharged from? [place]   Does the patient have one of the following disease processes/diagnoses(primary or secondary)? Sepsis   TCM attempt successful? Yes   Call start time 1617   Call end time 1626   Discharge diagnosis sepsis, GI Bleed, ARF   Person spoke with today (if not patient) and relationship alex Laurens reviewed with patient/caregiver? Yes   Is the patient having any side effects they believe may be caused by any medication additions or changes? No   Does the patient have all medications related to this admission filled (includes all antibiotics, inhalers, nebulizers,steroids,etc.) Yes   Is the patient taking all medications as directed (includes completed medication regime)? Yes   Does the patient have a primary care provider?  Yes   Comments regarding PCP hospital fu appt on 11/9/21 at 11:00 AM   Does the patient have an appointment with their PCP within 7 days of discharge? Yes   Has the patient kept scheduled appointments due by today? N/A   Psychosocial issues? No   Did the patient receive a copy of their discharge instructions? Yes   Nursing interventions Reviewed instructions with patient   What is the patient's perception of their health status since discharge? Improving   Nursing interventions Nurse provided patient education   Is the patient/caregiver able to teach back Sepsis? S - Shivering,fever or very cold,  S - Short of breath,  E - Extreme pain or generalized discomfort (worst ever,especially abdomen),  S - Sleepy, difficult to arouse,confused   Nursing interventions Nurse provided patient education   Is patient/caregiver able to teach back steps to recovery at home? Rest and regain strength,  Eat a balanced diet   Is the patient/caregiver able to teach back signs and symptoms of worsening condition: Fever,  Altered mental status(confusion/coma),  Shortness of breath/rapid respiratory rate   If the patient is a current  smoker, are they able to teach back resources for cessation? Not a smoker   Is the patient/caregiver able to teach back the hierarchy of who to call/visit for symptoms/problems? PCP, Specialist, Home health nurse, Urgent Care, ED, 911 Yes   TCM call completed? Yes   Wrap up additional comments Kathleen, daughter states pt is doing better. Kathleen verified PCP hospital fu appt on 11/9/21. No questions/concerns.          Sarita Mitchell RN    11/4/2021, 16:27 EDT

## 2021-11-05 ENCOUNTER — LAB (OUTPATIENT)
Dept: LAB | Facility: HOSPITAL | Age: 71
End: 2021-11-05

## 2021-11-05 DIAGNOSIS — C91.10 CLL (CHRONIC LYMPHOCYTIC LEUKEMIA) (HCC): ICD-10-CM

## 2021-11-05 DIAGNOSIS — N18.4 CRD (CHRONIC RENAL DISEASE), STAGE IV (HCC): ICD-10-CM

## 2021-11-05 DIAGNOSIS — N18.4 ANEMIA DUE TO STAGE 4 CHRONIC KIDNEY DISEASE (HCC): Primary | ICD-10-CM

## 2021-11-05 DIAGNOSIS — D63.8 ANEMIA, CHRONIC DISEASE: ICD-10-CM

## 2021-11-05 DIAGNOSIS — D69.6 THROMBOCYTOPENIA (HCC): ICD-10-CM

## 2021-11-05 DIAGNOSIS — E61.1 LOW IRON: ICD-10-CM

## 2021-11-05 DIAGNOSIS — K29.61 GASTROINTESTINAL HEMORRHAGE ASSOCIATED WITH OTHER GASTRITIS: ICD-10-CM

## 2021-11-05 DIAGNOSIS — D63.1 ANEMIA DUE TO STAGE 4 CHRONIC KIDNEY DISEASE (HCC): Primary | ICD-10-CM

## 2021-11-05 DIAGNOSIS — E61.0 COPPER DEFICIENCY: ICD-10-CM

## 2021-11-05 LAB
ALBUMIN SERPL-MCNC: 3.3 G/DL (ref 3.5–5.2)
ALBUMIN/GLOB SERPL: 1.4 G/DL
ALP SERPL-CCNC: 189 U/L (ref 39–117)
ALT SERPL W P-5'-P-CCNC: 16 U/L (ref 1–41)
ANION GAP SERPL CALCULATED.3IONS-SCNC: 7 MMOL/L (ref 5–15)
AST SERPL-CCNC: 28 U/L (ref 1–40)
B2 MICROGLOB SERPL-MCNC: 11.8 MG/L (ref 0.8–2.2)
BASOPHILS # BLD AUTO: 0.05 10*3/MM3 (ref 0–0.2)
BASOPHILS NFR BLD AUTO: 0.7 % (ref 0–1.5)
BILIRUB SERPL-MCNC: <0.2 MG/DL (ref 0–1.2)
BUN SERPL-MCNC: 31 MG/DL (ref 8–23)
BUN/CREAT SERPL: 12 (ref 7–25)
CALCIUM SPEC-SCNC: 8.2 MG/DL (ref 8.6–10.5)
CHLORIDE SERPL-SCNC: 109 MMOL/L (ref 98–107)
CO2 SERPL-SCNC: 24 MMOL/L (ref 22–29)
CREAT SERPL-MCNC: 2.58 MG/DL (ref 0.76–1.27)
DEPRECATED RDW RBC AUTO: 57.2 FL (ref 37–54)
EOSINOPHIL # BLD AUTO: 0.25 10*3/MM3 (ref 0–0.4)
EOSINOPHIL NFR BLD AUTO: 3.4 % (ref 0.3–6.2)
ERYTHROCYTE [DISTWIDTH] IN BLOOD BY AUTOMATED COUNT: 16 % (ref 12.3–15.4)
FERRITIN SERPL-MCNC: 366.5 NG/ML (ref 30–400)
GFR SERPL CREATININE-BSD FRML MDRD: 25 ML/MIN/1.73
GLOBULIN UR ELPH-MCNC: 2.4 GM/DL
GLUCOSE SERPL-MCNC: 109 MG/DL (ref 65–99)
HCT VFR BLD AUTO: 24.8 % (ref 37.5–51)
HGB BLD-MCNC: 7.8 G/DL (ref 13–17.7)
HOLD SPECIMEN: NORMAL
IMM GRANULOCYTES # BLD AUTO: 0.1 10*3/MM3 (ref 0–0.05)
IMM GRANULOCYTES NFR BLD AUTO: 1.3 % (ref 0–0.5)
IRON 24H UR-MRATE: 75 MCG/DL (ref 59–158)
IRON SATN MFR SERPL: 32 % (ref 20–50)
LYMPHOCYTES # BLD AUTO: 1.09 10*3/MM3 (ref 0.7–3.1)
LYMPHOCYTES NFR BLD AUTO: 14.7 % (ref 19.6–45.3)
MCH RBC QN AUTO: 30.6 PG (ref 26.6–33)
MCHC RBC AUTO-ENTMCNC: 31.5 G/DL (ref 31.5–35.7)
MCV RBC AUTO: 97.3 FL (ref 79–97)
MONOCYTES # BLD AUTO: 0.55 10*3/MM3 (ref 0.1–0.9)
MONOCYTES NFR BLD AUTO: 7.4 % (ref 5–12)
NEUTROPHILS NFR BLD AUTO: 5.37 10*3/MM3 (ref 1.7–7)
NEUTROPHILS NFR BLD AUTO: 72.5 % (ref 42.7–76)
NRBC BLD AUTO-RTO: 0 /100 WBC (ref 0–0.2)
PLATELET # BLD AUTO: 147 10*3/MM3 (ref 140–450)
PMV BLD AUTO: 9.7 FL (ref 6–12)
POTASSIUM SERPL-SCNC: 4.6 MMOL/L (ref 3.5–5.2)
PROT SERPL-MCNC: 5.7 G/DL (ref 6–8.5)
RBC # BLD AUTO: 2.55 10*6/MM3 (ref 4.14–5.8)
SODIUM SERPL-SCNC: 140 MMOL/L (ref 136–145)
STFR SERPL-SCNC: 16.1 NMOL/L (ref 12.2–27.3)
TIBC SERPL-MCNC: 237 MCG/DL (ref 298–536)
TRANSFERRIN SERPL-MCNC: 159 MG/DL (ref 200–360)
WBC # BLD AUTO: 7.41 10*3/MM3 (ref 3.4–10.8)

## 2021-11-05 PROCEDURE — 85025 COMPLETE CBC W/AUTO DIFF WBC: CPT

## 2021-11-05 PROCEDURE — 36415 COLL VENOUS BLD VENIPUNCTURE: CPT

## 2021-11-05 PROCEDURE — 82728 ASSAY OF FERRITIN: CPT

## 2021-11-05 PROCEDURE — 83540 ASSAY OF IRON: CPT

## 2021-11-05 PROCEDURE — 82232 ASSAY OF BETA-2 PROTEIN: CPT

## 2021-11-05 PROCEDURE — 80053 COMPREHEN METABOLIC PANEL: CPT

## 2021-11-05 PROCEDURE — 84466 ASSAY OF TRANSFERRIN: CPT

## 2021-11-09 ENCOUNTER — OFFICE VISIT (OUTPATIENT)
Dept: INTERNAL MEDICINE | Facility: CLINIC | Age: 71
End: 2021-11-09

## 2021-11-09 VITALS
WEIGHT: 163 LBS | HEART RATE: 72 BPM | DIASTOLIC BLOOD PRESSURE: 68 MMHG | TEMPERATURE: 97.5 F | HEIGHT: 72 IN | BODY MASS INDEX: 22.08 KG/M2 | OXYGEN SATURATION: 98 % | SYSTOLIC BLOOD PRESSURE: 132 MMHG

## 2021-11-09 DIAGNOSIS — K92.1 GASTROINTESTINAL HEMORRHAGE WITH MELENA: ICD-10-CM

## 2021-11-09 DIAGNOSIS — N18.4 CKD (CHRONIC KIDNEY DISEASE) STAGE 4, GFR 15-29 ML/MIN (HCC): ICD-10-CM

## 2021-11-09 DIAGNOSIS — E83.42 HYPOMAGNESEMIA: ICD-10-CM

## 2021-11-09 DIAGNOSIS — Z09 HOSPITAL DISCHARGE FOLLOW-UP: Primary | ICD-10-CM

## 2021-11-09 DIAGNOSIS — I10 HYPERTENSION, BENIGN: ICD-10-CM

## 2021-11-09 DIAGNOSIS — E87.6 HYPOKALEMIA: ICD-10-CM

## 2021-11-09 PROCEDURE — 1111F DSCHRG MED/CURRENT MED MERGE: CPT | Performed by: NURSE PRACTITIONER

## 2021-11-09 PROCEDURE — 99495 TRANSJ CARE MGMT MOD F2F 14D: CPT | Performed by: NURSE PRACTITIONER

## 2021-11-09 RX ORDER — CLOPIDOGREL BISULFATE 75 MG/1
75 TABLET ORAL DAILY
COMMUNITY
End: 2022-07-12

## 2021-11-09 RX ORDER — ASPIRIN 81 MG/1
TABLET ORAL DAILY
COMMUNITY

## 2021-11-09 RX ORDER — VALSARTAN 160 MG/1
160 TABLET ORAL DAILY
COMMUNITY
End: 2022-11-28 | Stop reason: SDUPTHER

## 2021-11-09 RX ORDER — OMEPRAZOLE 20 MG/1
20 CAPSULE, DELAYED RELEASE ORAL DAILY
COMMUNITY
End: 2021-11-23

## 2021-11-09 RX ORDER — METOPROLOL SUCCINATE 25 MG/1
TABLET, EXTENDED RELEASE ORAL
Qty: 90 TABLET | Refills: 0 | Status: SHIPPED | OUTPATIENT
Start: 2021-11-09 | End: 2022-02-07

## 2021-11-09 RX ORDER — CETIRIZINE HYDROCHLORIDE 10 MG/1
1 TABLET ORAL DAILY
COMMUNITY
End: 2021-11-23

## 2021-11-09 NOTE — PROGRESS NOTES
"Transitional Care Follow Up Visit  Subjective     Ricardo Hugo is a 71 y.o. male who presents for a transitional care management visit.    Within 48 business hours after discharge our office contacted him via telephone to coordinate his care and needs.      I reviewed and discussed the details of that call along with the discharge summary, hospital problems, inpatient lab results, inpatient diagnostic studies, and consultation reports with Ricardo.     Current outpatient and discharge medications have been reconciled for the patient.  Reviewed by: STERLING Zambrano      Date of TCM Phone Call 11/3/2021   [facility]   Date of Admission 10/28/2021   Date of Discharge 11/3/2021   Discharge Disposition Home or Self Care     Risk for Readmission (LACE) Score: 13 (11/3/2021  5:00 AM)      Mr. Hugo present to the office for TCM follow up. Patient was admitted for 3 days after onset of blood in stool and allergy to zofran. Patient reports taking zofran, felt his throat get tight and worsened his nausea\". Patient reports upper endoscopy on 11/2 and was found to have an esophageal ring, irregular Z-line, erythematous mucosa in the stomach, nonbleeding erosive gastropathy.. However, hospital stopped his plavix and asa until follow up. However, patient has resumed his medicine yesterday. He denies blood in stool or gastric irritation. He did not start the protonix as recommended. He instead resumed the Prilosec. He has a follow up with Dr. Goldberg this week and will be rechecking his labs. He reports they will be rechecking his magnesium level and potassium. Blood pressure is well controlled today, but reports that he has resumed the valsartan, which had been discontinued at the hospital stay. He reports overall \" feeling a lot better\". He denies acute complaints.      Course During Hospital Stay:   His occult stool was positive in the ER.  He was noted to have a hemoglobin drop from " prior 10.6 to 8.  He was started on IV Protonix drip in the ER and given a unit of PRBC by ER provider.  He was also noted to be in acute on chronic renal failure with creatinine over 4 with a baseline somewhere in the low to mid twos.  He was started fluid resuscitation.  He is mid to the hospital.  Nephrology, hematology, GI were consulted.  A CT of his neck was done which showed no acute issues.  Chest x-ray with no infiltrate or effusion.  Patient's blood counts are monitored and his H&H stabilized.  He was taken by GI to endoscopy on 11/2 and was found to have an esophageal ring, irregular Z-line, erythematous mucosa in the stomach, nonbleeding erosive gastropathy.  He also had 2 angiectasia's in the duodenum that were cauterized.  He was monitored overnight and did well.  No further signs of bleeding.  H&H stable.  His renal function has returned to normal prior levels after IV fluid resuscitation.  He has been cleared for discharge by hematology, GI, nephrology.  Also services want to see in close follow-up.  Of note GI recommends continuing PPI twice daily and keeping off antiplatelets at this time.      The following portions of the patient's history were reviewed and updated as appropriate: allergies, current medications, past family history, past medical history, past social history, past surgical history and problem list.    Review of Systems   Constitutional: Negative for activity change, appetite change, fatigue, fever and unexpected weight change.   HENT: Negative for congestion, ear discharge, ear pain, hearing loss, postnasal drip, rhinorrhea, sinus pressure, tinnitus, trouble swallowing and voice change.    Eyes: Negative for discharge and visual disturbance.   Respiratory: Negative for apnea, cough and shortness of breath.    Cardiovascular: Negative for chest pain, palpitations and leg swelling.   Gastrointestinal: Negative for abdominal pain, blood in stool, constipation and rectal pain.    Endocrine: Negative for cold intolerance, heat intolerance, polydipsia and polyphagia.   Genitourinary: Negative for decreased urine volume, difficulty urinating, frequency, hematuria and urgency.   Musculoskeletal: Negative for arthralgias, back pain, myalgias, neck pain and neck stiffness.   Skin: Negative for color change, rash and wound.   Allergic/Immunologic: Negative for environmental allergies.   Neurological: Negative for dizziness, speech difficulty, weakness, numbness and headaches.   Hematological: Negative for adenopathy. Does not bruise/bleed easily.   Psychiatric/Behavioral: Negative for agitation, confusion, decreased concentration and sleep disturbance. The patient is not nervous/anxious.        Objective   Physical Exam  Vitals and nursing note reviewed.   Constitutional:       Appearance: He is well-developed.   HENT:      Head: Normocephalic and atraumatic.      Right Ear: External ear normal.      Left Ear: External ear normal.   Eyes:      Conjunctiva/sclera: Conjunctivae normal.      Pupils: Pupils are equal, round, and reactive to light.   Neck:      Thyroid: No thyromegaly.   Cardiovascular:      Rate and Rhythm: Normal rate and regular rhythm.      Heart sounds: Normal heart sounds.   Pulmonary:      Effort: Pulmonary effort is normal.      Breath sounds: Normal breath sounds.      Comments: Course breath sound throughout  Abdominal:      General: Bowel sounds are normal.      Palpations: Abdomen is soft.   Musculoskeletal:      Cervical back: Normal range of motion and neck supple.   Lymphadenopathy:      Cervical: No cervical adenopathy.   Skin:     General: Skin is warm and dry.   Neurological:      Mental Status: He is alert and oriented to person, place, and time.   Psychiatric:         Behavior: Behavior normal.         Thought Content: Thought content normal.         Assessment/Plan   Diagnoses and all orders for this visit:    1. Hospital discharge follow-up (Primary)    2. CKD  (chronic kidney disease) stage 4, GFR 15-29 ml/min ([facility])  Comments:  follow up with nephrology in 2 weeks    3. Hypomagnesemia    4. Hypokalemia    5. Gastrointestinal hemorrhage with melena  Comments:  resolved    6. Hypertension, benign  Comments:  stable      Patient will proceed with specialty follow up this month and early next month.  Will have labs rechecked in 2 days for Dr. Goldberg. Continue current potassium and magnesium therapy.    Okay with patient resuming plavix and asa. Would like for him to discuss with GI if they would prefer him to remain off asa. Will have patient call if developing bright red blood in stool or black, tarry stools. Will have him take prilosec 20 in the AM and 20 in the PM. Can reduce once GI has approved. Appointment in 2 weeks.    Blood pressure is well controlled on current therapy no change.

## 2021-11-11 ENCOUNTER — TELEPHONE (OUTPATIENT)
Dept: ONCOLOGY | Facility: CLINIC | Age: 71
End: 2021-11-11

## 2021-11-11 ENCOUNTER — READMISSION MANAGEMENT (OUTPATIENT)
Dept: CALL CENTER | Facility: HOSPITAL | Age: 71
End: 2021-11-11

## 2021-11-11 ENCOUNTER — OFFICE VISIT (OUTPATIENT)
Dept: ONCOLOGY | Facility: CLINIC | Age: 71
End: 2021-11-11

## 2021-11-11 ENCOUNTER — LAB (OUTPATIENT)
Dept: LAB | Facility: HOSPITAL | Age: 71
End: 2021-11-11

## 2021-11-11 ENCOUNTER — INFUSION (OUTPATIENT)
Dept: ONCOLOGY | Facility: HOSPITAL | Age: 71
End: 2021-11-11

## 2021-11-11 VITALS
RESPIRATION RATE: 16 BRPM | OXYGEN SATURATION: 99 % | HEART RATE: 71 BPM | BODY MASS INDEX: 22.39 KG/M2 | WEIGHT: 165.3 LBS | TEMPERATURE: 96.8 F | SYSTOLIC BLOOD PRESSURE: 126 MMHG | HEIGHT: 72 IN | DIASTOLIC BLOOD PRESSURE: 66 MMHG

## 2021-11-11 VITALS
HEIGHT: 72 IN | HEART RATE: 65 BPM | OXYGEN SATURATION: 99 % | RESPIRATION RATE: 18 BRPM | WEIGHT: 164 LBS | DIASTOLIC BLOOD PRESSURE: 57 MMHG | TEMPERATURE: 98.1 F | BODY MASS INDEX: 22.21 KG/M2 | SYSTOLIC BLOOD PRESSURE: 166 MMHG

## 2021-11-11 DIAGNOSIS — C91.10 CLL (CHRONIC LYMPHOCYTIC LEUKEMIA) (HCC): ICD-10-CM

## 2021-11-11 DIAGNOSIS — D63.1 ANEMIA DUE TO STAGE 4 CHRONIC KIDNEY DISEASE (HCC): Primary | ICD-10-CM

## 2021-11-11 DIAGNOSIS — K29.61 GASTROINTESTINAL HEMORRHAGE ASSOCIATED WITH OTHER GASTRITIS: ICD-10-CM

## 2021-11-11 DIAGNOSIS — K29.61 GASTROINTESTINAL HEMORRHAGE ASSOCIATED WITH OTHER GASTRITIS: Primary | ICD-10-CM

## 2021-11-11 DIAGNOSIS — N18.4 ANEMIA DUE TO STAGE 4 CHRONIC KIDNEY DISEASE (HCC): ICD-10-CM

## 2021-11-11 DIAGNOSIS — N18.4 ANEMIA DUE TO STAGE 4 CHRONIC KIDNEY DISEASE (HCC): Primary | ICD-10-CM

## 2021-11-11 DIAGNOSIS — D63.1 ANEMIA DUE TO STAGE 4 CHRONIC KIDNEY DISEASE (HCC): ICD-10-CM

## 2021-11-11 LAB
ALBUMIN SERPL-MCNC: 3.8 G/DL (ref 3.5–5.2)
ALBUMIN/GLOB SERPL: 1.4 G/DL
ALP SERPL-CCNC: 197 U/L (ref 39–117)
ALT SERPL W P-5'-P-CCNC: 17 U/L (ref 1–41)
ANION GAP SERPL CALCULATED.3IONS-SCNC: 13 MMOL/L (ref 5–15)
AST SERPL-CCNC: 22 U/L (ref 1–40)
BASOPHILS # BLD AUTO: 0.07 10*3/MM3 (ref 0–0.2)
BASOPHILS NFR BLD AUTO: 0.8 % (ref 0–1.5)
BILIRUB SERPL-MCNC: 0.2 MG/DL (ref 0–1.2)
BUN SERPL-MCNC: 43 MG/DL (ref 8–23)
BUN/CREAT SERPL: 16.2 (ref 7–25)
CALCIUM SPEC-SCNC: 8.5 MG/DL (ref 8.6–10.5)
CHLORIDE SERPL-SCNC: 111 MMOL/L (ref 98–107)
CO2 SERPL-SCNC: 17 MMOL/L (ref 22–29)
CREAT SERPL-MCNC: 2.66 MG/DL (ref 0.76–1.27)
DEPRECATED RDW RBC AUTO: 56.1 FL (ref 37–54)
EOSINOPHIL # BLD AUTO: 0.29 10*3/MM3 (ref 0–0.4)
EOSINOPHIL NFR BLD AUTO: 3.1 % (ref 0.3–6.2)
ERYTHROCYTE [DISTWIDTH] IN BLOOD BY AUTOMATED COUNT: 15.6 % (ref 12.3–15.4)
FERRITIN SERPL-MCNC: 225.5 NG/ML (ref 30–400)
GFR SERPL CREATININE-BSD FRML MDRD: 24 ML/MIN/1.73
GLOBULIN UR ELPH-MCNC: 2.7 GM/DL
GLUCOSE SERPL-MCNC: 128 MG/DL (ref 65–99)
HCT VFR BLD AUTO: 28.3 % (ref 37.5–51)
HGB BLD-MCNC: 8.9 G/DL (ref 13–17.7)
HOLD SPECIMEN: NORMAL
IMM GRANULOCYTES # BLD AUTO: 0.06 10*3/MM3 (ref 0–0.05)
IMM GRANULOCYTES NFR BLD AUTO: 0.7 % (ref 0–0.5)
IRON 24H UR-MRATE: 100 MCG/DL (ref 59–158)
IRON SATN MFR SERPL: 31 % (ref 20–50)
LYMPHOCYTES # BLD AUTO: 1.52 10*3/MM3 (ref 0.7–3.1)
LYMPHOCYTES NFR BLD AUTO: 16.5 % (ref 19.6–45.3)
MCH RBC QN AUTO: 31 PG (ref 26.6–33)
MCHC RBC AUTO-ENTMCNC: 31.4 G/DL (ref 31.5–35.7)
MCV RBC AUTO: 98.6 FL (ref 79–97)
MONOCYTES # BLD AUTO: 0.62 10*3/MM3 (ref 0.1–0.9)
MONOCYTES NFR BLD AUTO: 6.7 % (ref 5–12)
NEUTROPHILS NFR BLD AUTO: 6.67 10*3/MM3 (ref 1.7–7)
NEUTROPHILS NFR BLD AUTO: 72.2 % (ref 42.7–76)
NRBC BLD AUTO-RTO: 0 /100 WBC (ref 0–0.2)
PLATELET # BLD AUTO: 184 10*3/MM3 (ref 140–450)
PMV BLD AUTO: 9.6 FL (ref 6–12)
POTASSIUM SERPL-SCNC: 4.4 MMOL/L (ref 3.5–5.2)
PROT SERPL-MCNC: 6.5 G/DL (ref 6–8.5)
RBC # BLD AUTO: 2.87 10*6/MM3 (ref 4.14–5.8)
SODIUM SERPL-SCNC: 141 MMOL/L (ref 136–145)
TIBC SERPL-MCNC: 326 MCG/DL (ref 298–536)
TRANSFERRIN SERPL-MCNC: 219 MG/DL (ref 200–360)
WBC # BLD AUTO: 9.23 10*3/MM3 (ref 3.4–10.8)

## 2021-11-11 PROCEDURE — 25010000002 EPOETIN ALFA-EPBX 40000 UNIT/ML SOLUTION: Performed by: INTERNAL MEDICINE

## 2021-11-11 PROCEDURE — 84466 ASSAY OF TRANSFERRIN: CPT

## 2021-11-11 PROCEDURE — 84630 ASSAY OF ZINC: CPT

## 2021-11-11 PROCEDURE — 83540 ASSAY OF IRON: CPT

## 2021-11-11 PROCEDURE — 96372 THER/PROPH/DIAG INJ SC/IM: CPT

## 2021-11-11 PROCEDURE — 82525 ASSAY OF COPPER: CPT

## 2021-11-11 PROCEDURE — 99213 OFFICE O/P EST LOW 20 MIN: CPT | Performed by: INTERNAL MEDICINE

## 2021-11-11 PROCEDURE — 82728 ASSAY OF FERRITIN: CPT

## 2021-11-11 PROCEDURE — 85025 COMPLETE CBC W/AUTO DIFF WBC: CPT

## 2021-11-11 PROCEDURE — 36415 COLL VENOUS BLD VENIPUNCTURE: CPT

## 2021-11-11 PROCEDURE — 80053 COMPREHEN METABOLIC PANEL: CPT

## 2021-11-11 RX ADMIN — EPOETIN ALFA-EPBX 40000 UNITS: 40000 INJECTION, SOLUTION INTRAVENOUS; SUBCUTANEOUS at 11:25

## 2021-11-11 NOTE — TELEPHONE ENCOUNTER
PATIENT NEW TXT SACHI  PROCRIT INJECTIONS W WEEKLY LAB CBC   PROCRIT 40,000 UNITS IF HGB BELOW 10 PER SET PARAMETERS   INJECTAFER 750 MG CAN SACHI X 1 TXT IF IRON SAT BELOW 20 AND FERRITIN ABOVE 100    PT AWARE PLAN STARTS TODAY 11/11/21 GOING FORWARD, LABS TO BE DRAWN ON THURSDAYS EXCEPT WEEK OF THANKSGIVING PATIENT WILL HAVE CBC AND POSSIBLE INJECTION ON WED 11/24/21 THAT WEEK.    AILYN CASTILLO REVIEWED PATIENT INSURANCE PATIENT APPROVED FOR THE PROCRIT INJECTIONS

## 2021-11-11 NOTE — OUTREACH NOTE
Sepsis Week 2 Survey      Responses   [facility] patient discharged from? [place]   Does the patient have one of the following disease processes/diagnoses(primary or secondary)? Sepsis   Week 2 attempt successful? No   Unsuccessful attempts Attempt 1   Discharge diagnosis sepsis, GI Bleed, ARF          Tasha Barfield RN

## 2021-11-16 ENCOUNTER — READMISSION MANAGEMENT (OUTPATIENT)
Dept: CALL CENTER | Facility: HOSPITAL | Age: 71
End: 2021-11-16

## 2021-11-16 LAB — COPPER SERPL-MCNC: 92 UG/DL (ref 69–132)

## 2021-11-16 NOTE — OUTREACH NOTE
Sepsis Week 2 Survey      Responses   [facility] patient discharged from? [place]   Does the patient have one of the following disease processes/diagnoses(primary or secondary)? Sepsis   Week 2 attempt successful? No   Unsuccessful attempts Attempt 2          Trudi Stacy LPN

## 2021-11-17 ENCOUNTER — OFFICE VISIT (OUTPATIENT)
Dept: GASTROENTEROLOGY | Facility: CLINIC | Age: 71
End: 2021-11-17

## 2021-11-17 ENCOUNTER — TELEPHONE (OUTPATIENT)
Dept: ONCOLOGY | Facility: CLINIC | Age: 71
End: 2021-11-17

## 2021-11-17 VITALS
OXYGEN SATURATION: 99 % | WEIGHT: 166 LBS | HEART RATE: 70 BPM | TEMPERATURE: 97.8 F | BODY MASS INDEX: 22.48 KG/M2 | DIASTOLIC BLOOD PRESSURE: 60 MMHG | HEIGHT: 72 IN | SYSTOLIC BLOOD PRESSURE: 170 MMHG

## 2021-11-17 DIAGNOSIS — K50.10 CROHN'S DISEASE OF LARGE INTESTINE WITHOUT COMPLICATION (HCC): ICD-10-CM

## 2021-11-17 DIAGNOSIS — D64.9 CHRONIC ANEMIA: ICD-10-CM

## 2021-11-17 DIAGNOSIS — Z87.19 HISTORY OF MELENA: Primary | ICD-10-CM

## 2021-11-17 DIAGNOSIS — Q27.30 AVM (ARTERIOVENOUS MALFORMATION): ICD-10-CM

## 2021-11-17 LAB — ZINC SERPL-MCNC: 127 UG/DL (ref 44–115)

## 2021-11-17 PROCEDURE — 99214 OFFICE O/P EST MOD 30 MIN: CPT | Performed by: NURSE PRACTITIONER

## 2021-11-17 NOTE — TELEPHONE ENCOUNTER
NOTIFIED PT OF ZINC STILL BEING ELEVATED. HE SAID HE STILL TAKES PRESERVISION AND IT CONTAINSS ZINC. I SUGGESTED CALLING HIS PHARMACIST AND ASK IF THEY RECOMMEND AN EYE SUPPLEMENT THAT DOES NOT CONTAIN ZINC AND SWITCH TO THAT.WENT OVER FOOD LIST TO AVOID AS WELL. HE V/U      ----- Message from Amit Goldberg, MD sent at 11/17/2021 10:27 AM CST -----  Zinc still found to be elevated.  Please call patient to discuss the following foods to be avoided (I also sent him a message on my chart) and to make sure he is not taking any multivitamins with zinc:  · In Multivitamins  · Pork  · Fortified cereals  · Chicken leg  · Lobster  · Baked beans  · Dry roasted cashews  · Low fat yogurt with fruit  · Chickpeas (garbanzo beans)  · Almonds  · Milk  · Chicken breast  · Cheddar cheese  · Mozzarella  · Kidney beans  · Green peas  · Sesame seeds  · Flounder or sole  · Oysters  · Toasted wheat germ  · Veal liver  · Roast beef  · Roasted pumpkin and squash seeds  · Dried watermelon seeds  · Dark chocolate and cocoa powder  · Lamb  · Peanuts  · Crab

## 2021-11-17 NOTE — PROGRESS NOTES
[facility] GASTROENTEROLOGY - OFFICE NOTE    11/17/2021    Ricardo Hugo   1950    Primary Physician: Joe Velasco MD    Chief Complaint   Patient presents with   • Follow-up     post endoscopy   f/u  Melena       HISTORY OF PRESENT ILLNESS:     Ricardo Hugo is a 71 y.o. male presents for f/u melena. He was hospitalized 10/2021 and was seen in consult by Dr. Bell for melena and anemia. EGD was performed , see details below.  He has had no further melena. No sign of active GI bleeding. He takes omeprazole daily for several years.  He denies any unintentional weight loss or abdominal pain.  Appetite is good.      He has been on plavix and has restarted. He has history cad and vascular disease.  He has had carotid surgery by Dr. Pineda 6/2021. He sees Dr. Pineda this week for vascular test.      He has chronic anemia and is followed by Dr. Goldberg. Last labs 11-11-21 hgb 8.9.  He sees Dr. Goldberg once weekly. He gets iron infusions.         EGD 11-2-21 by Dr. Bell  - Benign-appearing esophageal ring.  - Z-line irregular.  - Small hiatal hernia.  - Erythematous mucosa in the stomach.  - Non-bleeding erosive gastropathy.  - Two angioectasias in the duodenum. Treated with bipolar cautery.  Recommendations   - Return patient to [facility] for [facility] care.  - Clear liquid diet x 1, then advance as desired per primary inpatient care team..  - Avoid aspirin, ibuprofen, naproxen, or other non-steroidal anti-inflammatory drugs, anticoagulant/antiplatelet agents, ethanol as able clinically. 48 hours at minimum, up to 14 days if able clinically. Consider risk of thromboembolic phenomena as greater than re-bleeding risk, and  weigh risks vs benefits.  - Use a proton pump inhibitor PO BID.  - Observe patient's clinical course.  - Return to GI clinic in 2 weeks.  Please reconsult inpatient GI service prn.        Last colonoscopy 10-13-20   - The examined portion of the ileum was normal.  - A  single non-bleeding colonic angioectasia.  - hemosiderin staining, minimum and improved from July's exam  - The examination was otherwise normal on direct and retroflexion views.  - Diverticulosis in the sigmoid colon.  - Biopsies were taken with a cold forceps from the ascending colon, transverse colon, 40cm, 20cm and  rectum for evaluation of microscopic colitis.  - - No visual evidence of active Crohn's        Crohn's disease  This seems to be stable. He has one bm daily. Takes apriso 4 tabs daily.     Past Medical History:   Diagnosis Date   • 3-vessel CAD 8/11/2020   • Allergic rhinitis    • Anxiety disorder 4/27/2020   • Arthritis    • Asymmetrical sensorineural hearing loss 6/28/2017   • Atherosclerosis of native artery of both lower extremities with intermittent claudication ([facility]) 7/18/2019   • Avascular necrosis of femoral head, left ([facility]) 07/11/2020    right hip after surgery   • Carotid stenosis    • Chronic mucoid otitis media    • Chronic rhinitis    • Coronary artery disease     HEART BYPASS 2004   • Crohn's disease of large intestine with other complication ([facility]) 7/30/2020    Chronic diarrhea Colonoscopy July 2020 revealed mild patchy scattered hemosiderin staining with inflammation more so in rectosigmoid area.  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   • Iron deficiency anemia 7/14/2020   • Mixed hearing loss of left ear    • PAD (peripheral artery disease) ([facility]) 8/11/2020   • Perianal abscess    • Pernicious anemia 08/17/2020    took shots but never diagnosed with b12 deficiency   • Personal history of alcoholism ([facility]) 08/11/2020    quit drinking in 2013    • Prostatic hypertrophy 8/11/2020   • Sensorineural hearing loss    • Sepsis with acute renal failure (HCC) 9/15/2020   • Shortness of breath 5/27/2021   • Tinnitus    • Ventricular tachycardia, nonsustained ([facility]) 7/14/2020   • Weight loss 7/11/2020       Past Surgical History:   Procedure Laterality Date   • ARTERY SURGERY  2021    right side on neck   • CAROTID ENDARTERECTOMY Right 5/10/2021    Procedure: RIGHT CAROTID ENDARTERECTOMY WITH EEG;  Surgeon: Gil Pineda DO;  Location: [facility] HYBRID OR 12;  Service: Vascular;  Laterality: Right;   • COLONOSCOPY N/A 7/2/2020    Procedure: COLONOSCOPY WITH ANESTHESIA;  Surgeon: Adrien Brewster MD;  Location: [facility] ENDOSCOPY;  Service: Gastroenterology;  Laterality: N/A;  pre op: diarrhea  post op: polyps  PCP: Joe Velasco MD   • COLONOSCOPY N/A 10/13/2020    Procedure: COLONOSCOPY WITH ANESTHESIA;  Surgeon: Adrien Brewster MD;  Location: [facility] ENDOSCOPY;  Service: Gastroenterology;  Laterality: N/A;  Pre: Chronic Diarrhea, Crohn's  Post: AVM  Dr. Neftali Velasco  CO2 Inflation Used   • CORONARY ARTERY BYPASS GRAFT  2003    x3   • ENDOSCOPY N/A 11/2/2021    Procedure: ESOPHAGOGASTRODUODENOSCOPY WITH ANESTHESIA;  Surgeon: Bridger Bell MD;  Location: [facility] ENDOSCOPY;  Service: Gastroenterology;  Laterality: N/A;  pre anemia;gi bleed  post  gi bleed;schatski ring  Dr. ERIC Velasco   • EYE SURGERY Bilateral     catorac   • INCISION AND DRAINAGE PERIRECTAL ABSCESS N/A 3/3/2017    Procedure: INCISION AND DRAINAGE OF JEET ANAL ABSCESS;  Surgeon: Lynette Smith MD;  Location: [facility] OR;  Service:    • MYRINGOTOMY W/ TUBES Left 04/17/2017    06/10/2016   • TONSILLECTOMY     • TOTAL HIP ARTHROPLASTY Right 2006       Outpatient Medications Marked as Taking for the 11/17/21 encounter (Office Visit) with Zuleika Schulte APRN   Medication Sig Dispense Refill   • albuterol sulfate  (90 Base) MCG/ACT inhaler USE 2 INHALATIONS FOUR TIMES A DAY AS NEEDED 20.1  g 3   • ALPRAZolam (Xanax) 0.25 MG tablet Take 1 tablet by mouth At Night As Needed for Anxiety. 30 tablet 3   • amLODIPine (NORVASC) 10 MG tablet TAKE 1 TABLET DAILY 90 tablet 3   • Apriso 0.375 g 24 hr capsule TAKE 4 CAPSULES DAILY 360 capsule 0   • ASPIRIN 81 PO Take  by mouth Daily.     • atorvastatin (LIPITOR) 10 MG tablet Take 1 tablet by mouth Daily. 90 tablet 3   • azelastine (ASTELIN) 0.1 % nasal spray 1 spray into the nostril(s) as directed by provider 2 (Two) Times a Day. Use in each nostril as directed 90 mL 3   • cholestyramine (QUESTRAN) 4 g packet TAKE 1 PACKET BY MOUTH DAILY 30 each 5   • cloNIDine (CATAPRES) 0.2 MG tablet TAKE 3 TABLETS DAILY 270 tablet 3   • clopidogrel (PLAVIX) 75 MG tablet Take 75 mg by mouth Daily.     • Copper Gluconate 2 MG tablet Take 2 mg by mouth Daily. 30 tablet 6   • diphenhydrAMINE (Benadryl Allergy) 25 mg capsule Take 25 mg by mouth Every 6 (Six) Hours As Needed for Itching.     • fluticasone (FLONASE) 50 MCG/ACT nasal spray 2 sprays into the nostril(s) as directed by provider Daily As Needed for Rhinitis.     • furosemide (Lasix) 20 MG tablet Take 1 tablet by mouth Daily. 90 tablet 3   • hydrALAZINE (APRESOLINE) 25 MG tablet Take 1 tablet by mouth 3 (Three) Times a Day. 90 tablet 5   • levothyroxine (Synthroid) 75 MCG tablet Take 1 tablet by mouth Daily. 90 tablet 3   • magnesium chloride ER 64 MG DR tablet Take 143 mg by mouth Daily.     • Melatonin 10 MG capsule Take 2 capsules by mouth Every Night.     • metoprolol succinate XL (TOPROL-XL) 25 MG 24 hr tablet TAKE 1 TABLET DAILY 90 tablet 0   • Multiple Vitamins-Minerals (PRESERVISION/LUTEIN) capsule Take 1 capsule by mouth 2 (two) times a day.     • omeprazole (priLOSEC) 20 MG capsule Take 20 mg by mouth Daily.     • potassium chloride 10 MEQ CR tablet Take 1 tablet by mouth 2 (Two) Times a Day. 180 tablet 3   • sodium bicarbonate 650 MG tablet Take 0.5 tablets by mouth 2 (Two) Times a Day. 180 tablet 3   •  "valsartan (DIOVAN) 160 MG tablet Take 160 mg by mouth Daily.     • vitamin D (ERGOCALCIFEROL) 1.25 MG (67298 UT) capsule capsule Take 1 capsule by mouth 1 (One) Time Per Week. 15 capsule 3       Allergies   Allergen Reactions   • Ondansetron Anaphylaxis and GI Intolerance   • Lortab [Hydrocodone-Acetaminophen] Other (See Comments) and Hallucinations     CLOSTROPHOBIC   • Allopurinol Other (See Comments)     Pain on right side       Social History     Socioeconomic History   • Marital status:    Tobacco Use   • Smoking status: Former Smoker     Packs/day: 0.50     Years: 25.00     Pack years: 12.50     Types: Cigarettes     Start date:      Quit date: 10/13/2013     Years since quittin.1   • Smokeless tobacco: Never Used   • Tobacco comment: quit    Vaping Use   • Vaping Use: Never used   Substance and Sexual Activity   • Alcohol use: Not Currently   • Drug use: No   • Sexual activity: Defer       Family History   Problem Relation Age of Onset   • No Known Problems Mother    • No Known Problems Father    • No Known Problems Daughter    • Colon cancer Neg Hx    • Colon polyps Neg Hx        Review of Systems   Constitutional: Negative for chills, fever and unexpected weight change.   Respiratory: Negative for cough, shortness of breath and wheezing.    Cardiovascular: Negative for chest pain and palpitations.   Gastrointestinal: Negative for abdominal distention, abdominal pain, anal bleeding, blood in stool, constipation, diarrhea, nausea and vomiting.        Vitals:    21 0954   BP: 170/60   Pulse: 70   Temp: 97.8 °F (36.6 °C)   SpO2: 99%   Weight: 75.3 kg (166 lb)   Height: 182.9 cm (72\")      Body mass index is 22.51 kg/m².    Physical Exam  Vitals reviewed.   Constitutional:       General: He is not in acute distress.  Cardiovascular:      Rate and Rhythm: Normal rate and regular rhythm.      Heart sounds: Normal heart sounds.   Pulmonary:      Effort: Pulmonary effort is normal.      " Breath sounds: Normal breath sounds.   Abdominal:      General: Bowel sounds are normal. There is no distension.      Palpations: Abdomen is soft.      Tenderness: There is no abdominal tenderness.   Skin:     General: Skin is warm and dry.   Neurological:      Mental Status: He is alert.         Results for orders placed or performed in visit on 11/11/21   Ferritin    Specimen: Blood   Result Value Ref Range    Ferritin 225.50 30.00 - 400.00 ng/mL   Iron Profile    Specimen: Blood   Result Value Ref Range    Iron 100 59 - 158 mcg/dL    Iron Saturation 31 20 - 50 %    Transferrin 219 200 - 360 mg/dL    TIBC 326 298 - 536 mcg/dL   Comprehensive Metabolic Panel    Specimen: Blood   Result Value Ref Range    Glucose 128 (H) 65 - 99 mg/dL    BUN 43 (H) 8 - 23 mg/dL    Creatinine 2.66 (H) 0.76 - 1.27 mg/dL    Sodium 141 136 - 145 mmol/L    Potassium 4.4 3.5 - 5.2 mmol/L    Chloride 111 (H) 98 - 107 mmol/L    CO2 17.0 (L) 22.0 - 29.0 mmol/L    Calcium 8.5 (L) 8.6 - 10.5 mg/dL    Total Protein 6.5 6.0 - 8.5 g/dL    Albumin 3.80 3.50 - 5.20 g/dL    ALT (SGPT) 17 1 - 41 U/L    AST (SGOT) 22 1 - 40 U/L    Alkaline Phosphatase 197 (H) 39 - 117 U/L    Total Bilirubin 0.2 0.0 - 1.2 mg/dL    eGFR Non African Amer 24 (L) >60 mL/min/1.73    Globulin 2.7 gm/dL    A/G Ratio 1.4 g/dL    BUN/Creatinine Ratio 16.2 7.0 - 25.0    Anion Gap 13.0 5.0 - 15.0 mmol/L   Copper, Serum    Specimen: Blood   Result Value Ref Range    Copper 92 69 - 132 ug/dL   Zinc    Specimen: Blood   Result Value Ref Range    Zinc 127 (H) 44 - 115 ug/dL   CBC Auto Differential    Specimen: Blood   Result Value Ref Range    WBC 9.23 3.40 - 10.80 10*3/mm3    RBC 2.87 (L) 4.14 - 5.80 10*6/mm3    Hemoglobin 8.9 (L) 13.0 - 17.7 g/dL    Hematocrit 28.3 (L) 37.5 - 51.0 %    MCV 98.6 (H) 79.0 - 97.0 fL    MCH 31.0 26.6 - 33.0 pg    MCHC 31.4 (L) 31.5 - 35.7 g/dL    RDW 15.6 (H) 12.3 - 15.4 %    RDW-SD 56.1 (H) 37.0 - 54.0 fl    MPV 9.6 6.0 - 12.0 fL    Platelets 184 140  - 450 10*3/mm3    Neutrophil % 72.2 42.7 - 76.0 %    Lymphocyte % 16.5 (L) 19.6 - 45.3 %    Monocyte % 6.7 5.0 - 12.0 %    Eosinophil % 3.1 0.3 - 6.2 %    Basophil % 0.8 0.0 - 1.5 %    Immature Grans % 0.7 (H) 0.0 - 0.5 %    Neutrophils, Absolute 6.67 1.70 - 7.00 10*3/mm3    Lymphocytes, Absolute 1.52 0.70 - 3.10 10*3/mm3    Monocytes, Absolute 0.62 0.10 - 0.90 10*3/mm3    Eosinophils, Absolute 0.29 0.00 - 0.40 10*3/mm3    Basophils, Absolute 0.07 0.00 - 0.20 10*3/mm3    Immature Grans, Absolute 0.06 (H) 0.00 - 0.05 10*3/mm3    nRBC 0.0 0.0 - 0.2 /100 WBC   Gold Top - SST   Result Value Ref Range    Extra Tube Hold for add-ons.            ASSESSMENT AND PLAN    Assessment/Plan     Diagnoses and all orders for this visit:    1. History of melena (Primary)  Comments:  resolved     2. AVM (arteriovenous malformation)  Comments:  noted in duodenum and colon    3. Chronic anemia    4. Crohn's disease of large intestine without complication (HCC)      We had a long conversation.  He does have chronic anemia however developed melena recently.  He does have AVMs which is most likely the reason for the melena which would have been aggravated by Plavix as well as aspirin.  He understands that being on Plavix he would continue to be at increased risk of anemia/GI bleeding.  Recommend he discuss with vascular weighing risk and benefits of continuing Plavix..  At this point he has had no further melena.  He was restarted on Plavix.  Aspirin has been on hold.  I would recommend that he continue PPI daily.  I would recommend that he continue to follow-up with hematology for close monitoring of his blood count and iron infusions to stay ahead of the losses..  If he were to have signs of active bleeding then he is to come back and see us.  We discussed EGD findings.  He is up-to-date on his colonoscopy.       In regards to Crohn's disease, this appears to be stable.  Would recommend continue Apriso daily.    Patient have a year  office visit September 2022.  He will contact us sooner if has any questions concerns or problems.      STERLING Knutson      Addendum: discussed with jason Araya to restart aspirin.

## 2021-11-18 ENCOUNTER — INFUSION (OUTPATIENT)
Dept: ONCOLOGY | Facility: HOSPITAL | Age: 71
End: 2021-11-18

## 2021-11-18 ENCOUNTER — TELEPHONE (OUTPATIENT)
Dept: GASTROENTEROLOGY | Facility: CLINIC | Age: 71
End: 2021-11-18

## 2021-11-18 ENCOUNTER — LAB (OUTPATIENT)
Dept: LAB | Facility: HOSPITAL | Age: 71
End: 2021-11-18

## 2021-11-18 VITALS
WEIGHT: 166 LBS | RESPIRATION RATE: 18 BRPM | SYSTOLIC BLOOD PRESSURE: 170 MMHG | TEMPERATURE: 98.1 F | BODY MASS INDEX: 22.48 KG/M2 | OXYGEN SATURATION: 99 % | HEART RATE: 65 BPM | HEIGHT: 72 IN | DIASTOLIC BLOOD PRESSURE: 52 MMHG

## 2021-11-18 DIAGNOSIS — D63.1 ANEMIA DUE TO STAGE 4 CHRONIC KIDNEY DISEASE (HCC): Primary | ICD-10-CM

## 2021-11-18 DIAGNOSIS — N18.4 ANEMIA DUE TO STAGE 4 CHRONIC KIDNEY DISEASE (HCC): Primary | ICD-10-CM

## 2021-11-18 LAB
BASOPHILS # BLD AUTO: 0.05 10*3/MM3 (ref 0–0.2)
BASOPHILS NFR BLD AUTO: 0.9 % (ref 0–1.5)
DEPRECATED RDW RBC AUTO: 56.3 FL (ref 37–54)
EOSINOPHIL # BLD AUTO: 0.25 10*3/MM3 (ref 0–0.4)
EOSINOPHIL NFR BLD AUTO: 4.6 % (ref 0.3–6.2)
ERYTHROCYTE [DISTWIDTH] IN BLOOD BY AUTOMATED COUNT: 15.4 % (ref 12.3–15.4)
HCT VFR BLD AUTO: 30.6 % (ref 37.5–51)
HGB BLD-MCNC: 9.7 G/DL (ref 13–17.7)
HOLD SPECIMEN: NORMAL
IMM GRANULOCYTES # BLD AUTO: 0.03 10*3/MM3 (ref 0–0.05)
IMM GRANULOCYTES NFR BLD AUTO: 0.5 % (ref 0–0.5)
LYMPHOCYTES # BLD AUTO: 1.34 10*3/MM3 (ref 0.7–3.1)
LYMPHOCYTES NFR BLD AUTO: 24.5 % (ref 19.6–45.3)
MCH RBC QN AUTO: 31.7 PG (ref 26.6–33)
MCHC RBC AUTO-ENTMCNC: 31.7 G/DL (ref 31.5–35.7)
MCV RBC AUTO: 100 FL (ref 79–97)
MONOCYTES # BLD AUTO: 0.66 10*3/MM3 (ref 0.1–0.9)
MONOCYTES NFR BLD AUTO: 12.1 % (ref 5–12)
NEUTROPHILS NFR BLD AUTO: 3.13 10*3/MM3 (ref 1.7–7)
NEUTROPHILS NFR BLD AUTO: 57.4 % (ref 42.7–76)
NRBC BLD AUTO-RTO: 0 /100 WBC (ref 0–0.2)
PLATELET # BLD AUTO: 175 10*3/MM3 (ref 140–450)
PMV BLD AUTO: 9.9 FL (ref 6–12)
RBC # BLD AUTO: 3.06 10*6/MM3 (ref 4.14–5.8)
WBC NRBC COR # BLD: 5.46 10*3/MM3 (ref 3.4–10.8)

## 2021-11-18 PROCEDURE — 96372 THER/PROPH/DIAG INJ SC/IM: CPT

## 2021-11-18 PROCEDURE — 25010000002 EPOETIN ALFA-EPBX 40000 UNIT/ML SOLUTION: Performed by: INTERNAL MEDICINE

## 2021-11-18 PROCEDURE — 85025 COMPLETE CBC W/AUTO DIFF WBC: CPT

## 2021-11-18 PROCEDURE — 36415 COLL VENOUS BLD VENIPUNCTURE: CPT

## 2021-11-18 RX ADMIN — EPOETIN ALFA-EPBX 40000 UNITS: 40000 INJECTION, SOLUTION INTRAVENOUS; SUBCUTANEOUS at 10:51

## 2021-11-18 NOTE — TELEPHONE ENCOUNTER
Nathan, please contact patient and let him know Dr. Brewster says ok to restart his aspirin. Thank you       ----- Message from Adrien Brewster MD sent at 11/17/2021 11:40 AM CST -----  Okay for aspirin  ----- Message -----  From: Zuleika Schulte APRN  Sent: 11/17/2021   8:21 AM CST  To: Adrien Brewster MD    He was seen by Dr. Bell in hospital. Did egd, see report. He had been on plavix and his pcp restarted , ok to restart aspirin also?? I will see him this morning. Thank you

## 2021-11-22 ENCOUNTER — TELEPHONE (OUTPATIENT)
Dept: VASCULAR SURGERY | Facility: CLINIC | Age: 71
End: 2021-11-22

## 2021-11-22 NOTE — TELEPHONE ENCOUNTER
Left message reminding Mr Hugo of his appointment for Tuesday, November 23rd, 2021. [facility] 7 o'clock testing and follow up afterwards at 1115 am with Dr Pineda. Also advised Mr Hugo if he had any questions, concerns or needs to reschedule to please call the office at [phone].

## 2021-11-23 ENCOUNTER — HOSPITAL ENCOUNTER (OUTPATIENT)
Dept: ULTRASOUND IMAGING | Facility: HOSPITAL | Age: 71
Discharge: HOME | End: 2021-11-23

## 2021-11-23 ENCOUNTER — READMISSION MANAGEMENT (OUTPATIENT)
Dept: CALL CENTER | Facility: HOSPITAL | Age: 71
End: 2021-11-23

## 2021-11-23 ENCOUNTER — OFFICE VISIT (OUTPATIENT)
Dept: VASCULAR SURGERY | Facility: CLINIC | Age: 71
End: 2021-11-23

## 2021-11-23 VITALS
HEIGHT: 72 IN | OXYGEN SATURATION: 98 % | DIASTOLIC BLOOD PRESSURE: 68 MMHG | WEIGHT: 165 LBS | HEART RATE: 58 BPM | SYSTOLIC BLOOD PRESSURE: 134 MMHG | BODY MASS INDEX: 22.35 KG/M2

## 2021-11-23 DIAGNOSIS — I73.9 PAD (PERIPHERAL ARTERY DISEASE) (HCC): ICD-10-CM

## 2021-11-23 DIAGNOSIS — I65.22 STENOSIS OF LEFT CAROTID ARTERY: ICD-10-CM

## 2021-11-23 DIAGNOSIS — I10 ESSENTIAL HYPERTENSION: ICD-10-CM

## 2021-11-23 DIAGNOSIS — I65.23 BILATERAL CAROTID ARTERY STENOSIS: Primary | ICD-10-CM

## 2021-11-23 PROCEDURE — 93880 EXTRACRANIAL BILAT STUDY: CPT

## 2021-11-23 PROCEDURE — 93923 UPR/LXTR ART STDY 3+ LVLS: CPT

## 2021-11-23 PROCEDURE — 99214 OFFICE O/P EST MOD 30 MIN: CPT | Performed by: SURGERY

## 2021-11-23 PROCEDURE — 93880 EXTRACRANIAL BILAT STUDY: CPT | Performed by: SURGERY

## 2021-11-23 PROCEDURE — 93923 UPR/LXTR ART STDY 3+ LVLS: CPT | Performed by: SURGERY

## 2021-11-23 RX ORDER — DESOXIMETASONE 2.5 MG/G
CREAM TOPICAL
Qty: 30 G | Refills: 23 | OUTPATIENT
Start: 2021-11-23

## 2021-11-23 RX ORDER — OMEPRAZOLE 20 MG/1
CAPSULE, DELAYED RELEASE ORAL
Qty: 90 CAPSULE | Refills: 0 | Status: SHIPPED | OUTPATIENT
Start: 2021-11-23 | End: 2022-02-08

## 2021-11-23 RX ORDER — FEXOFENADINE HCL 180 MG/1
180 TABLET ORAL DAILY
COMMUNITY

## 2021-11-23 NOTE — OUTREACH NOTE
Sepsis Week 3 Survey      Responses   [facility] patient discharged from? [place]   Does the patient have one of the following disease processes/diagnoses(primary or secondary)? Sepsis   Week 3 attempt successful? Yes   Call start time 1320   Call end time 1325   Discharge diagnosis sepsis, GI Bleed, ARF   Meds reviewed with patient/caregiver? Yes   Is the patient having any side effects they believe may be caused by any medication additions or changes? No   Does the patient have all medications related to this admission filled (includes all antibiotics, inhalers, nebulizers,steroids,etc.) Yes   Is the patient taking all medications as directed (includes completed medication regime)? Yes   Does the patient have a primary care provider?  Yes   Does the patient have an appointment with their PCP within 7 days of discharge? Yes   Has the patient kept scheduled appointments due by today? Yes   Psychosocial issues? No   Did the patient receive a copy of their discharge instructions? Yes   Nursing interventions Reviewed instructions with patient   What is the patient's perception of their health status since discharge? Improving   Nursing interventions Nurse provided patient education   Is the patient/caregiver able to teach back Sepsis? S - Shivering,fever or very cold,  S - Short of breath,  E - Extreme pain or generalized discomfort (worst ever,especially abdomen),  S - Sleepy, difficult to arouse,confused   Nursing interventions Nurse provided patient education   Is patient/caregiver able to teach back steps to recovery at home? Rest and regain strength,  Eat a balanced diet,  Set small, achievable goals for return to baseline health,  Make a list of questions for PCP appoinment   Is the patient/caregiver able to teach back signs and symptoms of worsening condition: Fever,  Altered mental status(confusion/coma),  Shortness of breath/rapid respiratory rate,  Hyperthermia,  Rapid heart rate (>90)   If the patient  "is a current smoker, are they able to teach back resources for cessation? Not a smoker   Is the patient/caregiver able to teach back the hierarchy of who to call/visit for symptoms/problems? PCP, Specialist, Home health nurse, Urgent Care, ED, 911 Yes   Week 3 call completed? Yes   Revoked No further contact(revokes)-requires comment   Is the patient interested in additional calls from an ambulatory ?  NOTE:  applies to high risk patients requiring additional follow-up. No   Graduated/Revoked comments Per request no further calls needed. \"\" I am fine.\"          Nelson Maxwell RN  "

## 2021-11-23 NOTE — PROGRESS NOTES
"11/23/2021       Joe Velasco MD  [address] [place] [address]    Ricardo Hugo  1950    Chief Complaint   Patient presents with   • Follow-up     3 Month Follow Up For Bilateral Carotid Artery Stenosis and Peripheral Artery Disease. Test [id number] US pad carotid bilateral and US pad ankle / brach ind ext comp. Patient denies any stroke like symptoms.    • Former SMoker     Patient is a Former Smoker - Quit 2013   • Med Management     Verbally verified medications with patient from list patient brought in        Dear Joe Velasco MD       HPI  I had the pleasure of seeing your patient Ricardo Hugo in the office today.  As you recall, Ricardo Hugo is a 71 y.o.  male who we are following for lower extremity PAD and carotid occlusive disease.  He was previously having some claudication to his lower extremities however reports this has improved since beginning Plavix.  He was found to have significant carotid disease and underwent a right carotid endarterectomy on 5/10/2021.  He denies any strokelike symptoms.  He is maintained on aspirin, Plavix, and Lipitor.  His most recent GFR is 24, creatinine 2.66.  He did have noninvasive testing performed today, which I did review in office.    Review of Systems   Constitutional: Negative.    HENT: Negative.    Eyes: Negative.    Respiratory: Negative.    Cardiovascular: Negative.    Gastrointestinal: Negative.    Endocrine: Negative.    Genitourinary: Negative.    Musculoskeletal: Negative.    Skin: Negative.    Allergic/Immunologic: Negative.    Neurological: Negative.    Hematological: Negative.    Psychiatric/Behavioral: Negative.    All other systems reviewed and are negative.        /68 (BP Location: Right arm, Patient Position: Sitting, Cuff Size: Adult)   Pulse 58   Ht 182.9 cm (72\")   Wt 74.8 kg (165 lb)   SpO2 98%   BMI 22.38 kg/m²   Physical Exam  Vitals and nursing note reviewed.   Constitutional:       General: He is " not in acute distress.     Appearance: Normal appearance. He is well-developed and normal weight. He is not diaphoretic.   HENT:      Head: Normocephalic and atraumatic.   Neck:      Vascular: No carotid bruit or JVD.   Cardiovascular:      Rate and Rhythm: Normal rate and regular rhythm.      Pulses:           Femoral pulses are 2+ on the right side and 2+ on the left side.       Dorsalis pedis pulses are detected w/ Doppler on the right side and detected w/ Doppler on the left side.        Posterior tibial pulses are detected w/ Doppler on the right side and detected w/ Doppler on the left side.      Heart sounds: Normal heart sounds, S1 normal and S2 normal. No murmur heard.  No friction rub. No gallop.    Pulmonary:      Effort: Pulmonary effort is normal.      Breath sounds: Normal breath sounds.   Abdominal:      General: Bowel sounds are normal. There is no abdominal bruit.      Palpations: Abdomen is soft.      Tenderness: There is no abdominal tenderness.   Musculoskeletal:         General: Normal range of motion.   Skin:     General: Skin is warm and dry.   Neurological:      General: No focal deficit present.      Mental Status: He is alert and oriented to person, place, and time.      Cranial Nerves: No cranial nerve deficit.   Psychiatric:         Mood and Affect: Mood normal.         Behavior: Behavior normal.         Thought Content: Thought content normal.         Judgment: Judgment normal.          Diagnostic data:   Noninvasive testing including a carotid duplex shows 50 to 69% carotid stenosis bilaterally with bilateral antegrade vertebral flow.  ABIs show right SAGRARIO of 0.76 and a left SAGRARIO of 0.63 which indicate moderate arterial disease.    Patient Active Problem List   Diagnosis   • Chronic rhinitis   • Acute renal failure superimposed on stage 4 chronic kidney disease (HCC)   • Hyponatremia   • Acute cystitis   • Metabolic acidosis, increased anion gap   • Hypomagnesemia   • Hypokalemia   •  Moderate malnutrition (HCC)   • Hypertension, benign   • Sepsis (HCC)   • Enterocolitis   • Chronic diarrhea   • UTI (urinary tract infection), bacterial   • Crohn's disease of large intestine with other complication (HCC)   • Asymmetrical hearing loss of left ear   • Symptomatic anemia   • CKD (chronic kidney disease) stage 4, GFR 15-29 ml/min (HCC)   • Bruit of right carotid artery   • Wellness examination   • Pre-op evaluation   • PAD (peripheral artery disease) (HCC)   • IHD (ischemic heart disease)   • Carotid stenosis   • Stenosis of right carotid artery   • Preop testing   • Carotid stenosis, right   • Avascular necrosis of bone of hip (HCC)   • Diastolic dysfunction   • Shortness of breath   • CRD (chronic renal disease), stage IV (HCC)   • Thrombocytopenia (HCC)   • History of alcoholism (HCC)   • Anemia due to chronic kidney disease   • Copper deficiency   • Low iron    • CLL (chronic lymphocytic leukemia) (HCC)   • Pulmonary hypertension (HCC)   • GI bleed   • Hypomagnesemia   • Hypokalemia         ICD-10-CM ICD-9-CM   1. Bilateral carotid artery stenosis  I65.23 433.10     433.30   2. PAD (peripheral artery disease) (HCC)  I73.9 443.9   3. Essential hypertension  I10 401.9       Plan: After thoroughly evaluating Ricardo Hugo, I believe the best course of action is to remain conservative from vascular surgery standpoint.  Currently he states he is doing well and has to walk a really long distance before he starts having discomfort to his legs.  His testing is unchanged with moderate arterial insufficiency to bilateral lower extremities.  His carotid duplex is stable with 50 to 69% carotid stenosis bilaterally.  We will see him back in 6 months with repeat noninvasive testing for continued surveillance, including a carotid duplex and ABIs.  If he develops worsening symptoms he could undergo angiogram but would need hydration prior to the procedure.  I did discuss vascular risk factors as it pertain to  the progression of vascular disease including controlling his hypertension, which is stable on his current medications. Patient's Body mass index is 22.38 kg/m². indicating that he is within normal range (BMI 18.5-24.9). No BMI management plan needed..  The patient can continue taking their current medication regimen as previously planned.  This was all discussed in full with complete understanding.    Thank you for allowing me to participate in the care of your patient.  Please do not hesitate with any questions or concerns.  I will keep you aware of any further encounters with Ricardo Hugo.        Sincerely yours,         STERLING Murray

## 2021-11-24 ENCOUNTER — INFUSION (OUTPATIENT)
Dept: ONCOLOGY | Facility: HOSPITAL | Age: 71
End: 2021-11-24

## 2021-11-24 ENCOUNTER — LAB (OUTPATIENT)
Dept: LAB | Facility: HOSPITAL | Age: 71
End: 2021-11-24

## 2021-11-24 VITALS
HEIGHT: 72 IN | WEIGHT: 166 LBS | SYSTOLIC BLOOD PRESSURE: 166 MMHG | RESPIRATION RATE: 18 BRPM | BODY MASS INDEX: 22.48 KG/M2 | OXYGEN SATURATION: 98 % | TEMPERATURE: 97.9 F | DIASTOLIC BLOOD PRESSURE: 54 MMHG | HEART RATE: 61 BPM

## 2021-11-24 DIAGNOSIS — D63.1 ANEMIA DUE TO STAGE 4 CHRONIC KIDNEY DISEASE (HCC): Primary | ICD-10-CM

## 2021-11-24 DIAGNOSIS — N18.4 ANEMIA DUE TO STAGE 4 CHRONIC KIDNEY DISEASE (HCC): Primary | ICD-10-CM

## 2021-11-24 LAB
BASOPHILS # BLD AUTO: 0.04 10*3/MM3 (ref 0–0.2)
BASOPHILS NFR BLD AUTO: 0.6 % (ref 0–1.5)
DEPRECATED RDW RBC AUTO: 54.7 FL (ref 37–54)
EOSINOPHIL # BLD AUTO: 0.34 10*3/MM3 (ref 0–0.4)
EOSINOPHIL NFR BLD AUTO: 5.3 % (ref 0.3–6.2)
ERYTHROCYTE [DISTWIDTH] IN BLOOD BY AUTOMATED COUNT: 15.1 % (ref 12.3–15.4)
HCT VFR BLD AUTO: 32.4 % (ref 37.5–51)
HGB BLD-MCNC: 9.7 G/DL (ref 13–17.7)
HOLD SPECIMEN: NORMAL
IMM GRANULOCYTES # BLD AUTO: 0.03 10*3/MM3 (ref 0–0.05)
IMM GRANULOCYTES NFR BLD AUTO: 0.5 % (ref 0–0.5)
LYMPHOCYTES # BLD AUTO: 1.54 10*3/MM3 (ref 0.7–3.1)
LYMPHOCYTES NFR BLD AUTO: 24 % (ref 19.6–45.3)
MCH RBC QN AUTO: 29.5 PG (ref 26.6–33)
MCHC RBC AUTO-ENTMCNC: 29.9 G/DL (ref 31.5–35.7)
MCV RBC AUTO: 98.5 FL (ref 79–97)
MONOCYTES # BLD AUTO: 0.75 10*3/MM3 (ref 0.1–0.9)
MONOCYTES NFR BLD AUTO: 11.7 % (ref 5–12)
NEUTROPHILS NFR BLD AUTO: 3.72 10*3/MM3 (ref 1.7–7)
NEUTROPHILS NFR BLD AUTO: 57.9 % (ref 42.7–76)
NRBC BLD AUTO-RTO: 0 /100 WBC (ref 0–0.2)
PLATELET # BLD AUTO: 188 10*3/MM3 (ref 140–450)
PMV BLD AUTO: 9.5 FL (ref 6–12)
RBC # BLD AUTO: 3.29 10*6/MM3 (ref 4.14–5.8)
WBC NRBC COR # BLD: 6.42 10*3/MM3 (ref 3.4–10.8)

## 2021-11-24 PROCEDURE — 25010000002 EPOETIN ALFA-EPBX 40000 UNIT/ML SOLUTION: Performed by: INTERNAL MEDICINE

## 2021-11-24 PROCEDURE — 85025 COMPLETE CBC W/AUTO DIFF WBC: CPT

## 2021-11-24 PROCEDURE — 36415 COLL VENOUS BLD VENIPUNCTURE: CPT

## 2021-11-24 PROCEDURE — 96372 THER/PROPH/DIAG INJ SC/IM: CPT

## 2021-11-24 RX ADMIN — EPOETIN ALFA-EPBX 40000 UNITS: 40000 INJECTION, SOLUTION INTRAVENOUS; SUBCUTANEOUS at 09:30

## 2021-11-29 RX ORDER — POTASSIUM CHLORIDE 750 MG/1
TABLET, FILM COATED, EXTENDED RELEASE ORAL
Qty: 180 TABLET | Refills: 3 | Status: SHIPPED | OUTPATIENT
Start: 2021-11-29 | End: 2022-09-02 | Stop reason: SDUPTHER

## 2021-12-01 ENCOUNTER — LAB (OUTPATIENT)
Dept: LAB | Facility: HOSPITAL | Age: 71
End: 2021-12-01

## 2021-12-01 ENCOUNTER — INFUSION (OUTPATIENT)
Dept: ONCOLOGY | Facility: HOSPITAL | Age: 71
End: 2021-12-01

## 2021-12-01 VITALS
WEIGHT: 167 LBS | DIASTOLIC BLOOD PRESSURE: 50 MMHG | HEIGHT: 72 IN | SYSTOLIC BLOOD PRESSURE: 148 MMHG | HEART RATE: 59 BPM | RESPIRATION RATE: 18 BRPM | TEMPERATURE: 97.6 F | BODY MASS INDEX: 22.62 KG/M2 | OXYGEN SATURATION: 98 %

## 2021-12-01 DIAGNOSIS — N18.4 ANEMIA DUE TO STAGE 4 CHRONIC KIDNEY DISEASE (HCC): Primary | ICD-10-CM

## 2021-12-01 DIAGNOSIS — D63.1 ANEMIA DUE TO STAGE 4 CHRONIC KIDNEY DISEASE (HCC): Primary | ICD-10-CM

## 2021-12-01 LAB
BASOPHILS # BLD AUTO: 0.03 10*3/MM3 (ref 0–0.2)
BASOPHILS NFR BLD AUTO: 0.5 % (ref 0–1.5)
DEPRECATED RDW RBC AUTO: 51.2 FL (ref 37–54)
EOSINOPHIL # BLD AUTO: 0.21 10*3/MM3 (ref 0–0.4)
EOSINOPHIL NFR BLD AUTO: 3.6 % (ref 0.3–6.2)
ERYTHROCYTE [DISTWIDTH] IN BLOOD BY AUTOMATED COUNT: 14.5 % (ref 12.3–15.4)
HCT VFR BLD AUTO: 32.2 % (ref 37.5–51)
HGB BLD-MCNC: 9.7 G/DL (ref 13–17.7)
HOLD SPECIMEN: NORMAL
IMM GRANULOCYTES # BLD AUTO: 0.03 10*3/MM3 (ref 0–0.05)
IMM GRANULOCYTES NFR BLD AUTO: 0.5 % (ref 0–0.5)
LYMPHOCYTES # BLD AUTO: 1.18 10*3/MM3 (ref 0.7–3.1)
LYMPHOCYTES NFR BLD AUTO: 20.5 % (ref 19.6–45.3)
MCH RBC QN AUTO: 29 PG (ref 26.6–33)
MCHC RBC AUTO-ENTMCNC: 30.1 G/DL (ref 31.5–35.7)
MCV RBC AUTO: 96.4 FL (ref 79–97)
MONOCYTES # BLD AUTO: 0.68 10*3/MM3 (ref 0.1–0.9)
MONOCYTES NFR BLD AUTO: 11.8 % (ref 5–12)
NEUTROPHILS NFR BLD AUTO: 3.63 10*3/MM3 (ref 1.7–7)
NEUTROPHILS NFR BLD AUTO: 63.1 % (ref 42.7–76)
NRBC BLD AUTO-RTO: 0 /100 WBC (ref 0–0.2)
PLATELET # BLD AUTO: 156 10*3/MM3 (ref 140–450)
PMV BLD AUTO: 9.8 FL (ref 6–12)
RBC # BLD AUTO: 3.34 10*6/MM3 (ref 4.14–5.8)
WBC NRBC COR # BLD: 5.76 10*3/MM3 (ref 3.4–10.8)

## 2021-12-01 PROCEDURE — 25010000002 EPOETIN ALFA-EPBX 40000 UNIT/ML SOLUTION: Performed by: INTERNAL MEDICINE

## 2021-12-01 PROCEDURE — 96372 THER/PROPH/DIAG INJ SC/IM: CPT

## 2021-12-01 PROCEDURE — 36415 COLL VENOUS BLD VENIPUNCTURE: CPT

## 2021-12-01 PROCEDURE — 85025 COMPLETE CBC W/AUTO DIFF WBC: CPT

## 2021-12-01 RX ADMIN — EPOETIN ALFA-EPBX 40000 UNITS: 40000 INJECTION, SOLUTION INTRAVENOUS; SUBCUTANEOUS at 09:55

## 2021-12-06 ENCOUNTER — TELEMEDICINE (OUTPATIENT)
Dept: ONCOLOGY | Facility: CLINIC | Age: 71
End: 2021-12-06

## 2021-12-06 ENCOUNTER — LAB (OUTPATIENT)
Dept: LAB | Facility: HOSPITAL | Age: 71
End: 2021-12-06

## 2021-12-06 VITALS
SYSTOLIC BLOOD PRESSURE: 120 MMHG | HEIGHT: 72 IN | HEART RATE: 66 BPM | WEIGHT: 165.6 LBS | OXYGEN SATURATION: 98 % | DIASTOLIC BLOOD PRESSURE: 66 MMHG | TEMPERATURE: 97.1 F | BODY MASS INDEX: 22.43 KG/M2 | RESPIRATION RATE: 16 BRPM

## 2021-12-06 DIAGNOSIS — N18.9 ANEMIA DUE TO CHRONIC KIDNEY DISEASE, UNSPECIFIED CKD STAGE: ICD-10-CM

## 2021-12-06 DIAGNOSIS — E61.1 LOW IRON: ICD-10-CM

## 2021-12-06 DIAGNOSIS — D63.1 ANEMIA DUE TO CHRONIC KIDNEY DISEASE, UNSPECIFIED CKD STAGE: ICD-10-CM

## 2021-12-06 DIAGNOSIS — C91.10 CLL (CHRONIC LYMPHOCYTIC LEUKEMIA) (HCC): ICD-10-CM

## 2021-12-06 DIAGNOSIS — D63.1 ANEMIA DUE TO STAGE 4 CHRONIC KIDNEY DISEASE (HCC): Primary | ICD-10-CM

## 2021-12-06 DIAGNOSIS — N18.4 ANEMIA DUE TO STAGE 4 CHRONIC KIDNEY DISEASE (HCC): Primary | ICD-10-CM

## 2021-12-06 LAB
DEPRECATED RDW RBC AUTO: 51.2 FL (ref 37–54)
ERYTHROCYTE [DISTWIDTH] IN BLOOD BY AUTOMATED COUNT: 14.7 % (ref 12.3–15.4)
FERRITIN SERPL-MCNC: 31.33 NG/ML (ref 30–400)
HCT VFR BLD AUTO: 31.6 % (ref 37.5–51)
HGB BLD-MCNC: 9.3 G/DL (ref 13–17.7)
HOLD SPECIMEN: NORMAL
IRON 24H UR-MRATE: 28 MCG/DL (ref 59–158)
IRON SATN MFR SERPL: 9 % (ref 20–50)
MCH RBC QN AUTO: 27.8 PG (ref 26.6–33)
MCHC RBC AUTO-ENTMCNC: 29.4 G/DL (ref 31.5–35.7)
MCV RBC AUTO: 94.6 FL (ref 79–97)
PLATELET # BLD AUTO: 202 10*3/MM3 (ref 140–450)
PMV BLD AUTO: 10.6 FL (ref 6–12)
RBC # BLD AUTO: 3.34 10*6/MM3 (ref 4.14–5.8)
TIBC SERPL-MCNC: 319 MCG/DL (ref 298–536)
TRANSFERRIN SERPL-MCNC: 214 MG/DL (ref 200–360)
WBC NRBC COR # BLD: 6.43 10*3/MM3 (ref 3.4–10.8)

## 2021-12-06 PROCEDURE — 83540 ASSAY OF IRON: CPT

## 2021-12-06 PROCEDURE — 84466 ASSAY OF TRANSFERRIN: CPT

## 2021-12-06 PROCEDURE — 85027 COMPLETE CBC AUTOMATED: CPT

## 2021-12-06 PROCEDURE — 36415 COLL VENOUS BLD VENIPUNCTURE: CPT

## 2021-12-06 PROCEDURE — 82728 ASSAY OF FERRITIN: CPT

## 2021-12-06 PROCEDURE — 99212 OFFICE O/P EST SF 10 MIN: CPT | Performed by: INTERNAL MEDICINE

## 2021-12-06 RX ORDER — DIPHENHYDRAMINE HYDROCHLORIDE 50 MG/ML
50 INJECTION INTRAMUSCULAR; INTRAVENOUS AS NEEDED
Status: CANCELLED | OUTPATIENT
Start: 2021-12-08

## 2021-12-06 RX ORDER — DIPHENHYDRAMINE HYDROCHLORIDE 50 MG/ML
50 INJECTION INTRAMUSCULAR; INTRAVENOUS AS NEEDED
Status: CANCELLED | OUTPATIENT
Start: 2021-12-15

## 2021-12-06 RX ORDER — FAMOTIDINE 10 MG/ML
20 INJECTION, SOLUTION INTRAVENOUS AS NEEDED
Status: CANCELLED | OUTPATIENT
Start: 2021-12-15

## 2021-12-06 RX ORDER — SODIUM CHLORIDE 9 MG/ML
250 INJECTION, SOLUTION INTRAVENOUS ONCE
Status: CANCELLED | OUTPATIENT
Start: 2021-12-08

## 2021-12-06 RX ORDER — FAMOTIDINE 10 MG/ML
20 INJECTION, SOLUTION INTRAVENOUS AS NEEDED
Status: CANCELLED | OUTPATIENT
Start: 2021-12-08

## 2021-12-06 RX ORDER — SODIUM CHLORIDE 9 MG/ML
250 INJECTION, SOLUTION INTRAVENOUS ONCE
Status: CANCELLED | OUTPATIENT
Start: 2021-12-15

## 2021-12-08 ENCOUNTER — APPOINTMENT (OUTPATIENT)
Dept: LAB | Facility: HOSPITAL | Age: 71
End: 2021-12-08

## 2021-12-08 ENCOUNTER — INFUSION (OUTPATIENT)
Dept: ONCOLOGY | Facility: HOSPITAL | Age: 71
End: 2021-12-08

## 2021-12-08 VITALS
HEIGHT: 72 IN | SYSTOLIC BLOOD PRESSURE: 158 MMHG | OXYGEN SATURATION: 97 % | HEART RATE: 64 BPM | DIASTOLIC BLOOD PRESSURE: 50 MMHG | RESPIRATION RATE: 18 BRPM | TEMPERATURE: 97.9 F | BODY MASS INDEX: 22.62 KG/M2 | WEIGHT: 167 LBS

## 2021-12-08 DIAGNOSIS — D63.1 ANEMIA DUE TO STAGE 4 CHRONIC KIDNEY DISEASE (HCC): ICD-10-CM

## 2021-12-08 DIAGNOSIS — N18.4 ANEMIA DUE TO STAGE 4 CHRONIC KIDNEY DISEASE (HCC): ICD-10-CM

## 2021-12-08 DIAGNOSIS — N18.4 CRD (CHRONIC RENAL DISEASE), STAGE IV (HCC): Primary | ICD-10-CM

## 2021-12-08 PROCEDURE — 96367 TX/PROPH/DG ADDL SEQ IV INF: CPT

## 2021-12-08 PROCEDURE — 96365 THER/PROPH/DIAG IV INF INIT: CPT

## 2021-12-08 PROCEDURE — 25010000002 FERRIC CARBOXYMALTOSE 750 MG/15ML SOLUTION 15 ML VIAL: Performed by: INTERNAL MEDICINE

## 2021-12-08 RX ORDER — FAMOTIDINE 10 MG/ML
20 INJECTION, SOLUTION INTRAVENOUS AS NEEDED
Status: DISCONTINUED | OUTPATIENT
Start: 2021-12-08 | End: 2021-12-08 | Stop reason: HOSPADM

## 2021-12-08 RX ORDER — DIPHENHYDRAMINE HYDROCHLORIDE 50 MG/ML
50 INJECTION INTRAMUSCULAR; INTRAVENOUS AS NEEDED
Status: DISCONTINUED | OUTPATIENT
Start: 2021-12-08 | End: 2021-12-08 | Stop reason: HOSPADM

## 2021-12-08 RX ORDER — SODIUM CHLORIDE 9 MG/ML
250 INJECTION, SOLUTION INTRAVENOUS ONCE
Status: COMPLETED | OUTPATIENT
Start: 2021-12-08 | End: 2021-12-08

## 2021-12-08 RX ADMIN — FERRIC CARBOXYMALTOSE INJECTION 750 MG: 50 INJECTION, SOLUTION INTRAVENOUS at 12:06

## 2021-12-08 RX ADMIN — SODIUM CHLORIDE 250 ML: 9 INJECTION, SOLUTION INTRAVENOUS at 12:05

## 2021-12-13 RX ORDER — HYDRALAZINE HYDROCHLORIDE 10 MG/1
TABLET, FILM COATED ORAL
Qty: 270 TABLET | OUTPATIENT
Start: 2021-12-13

## 2021-12-15 ENCOUNTER — APPOINTMENT (OUTPATIENT)
Dept: LAB | Facility: HOSPITAL | Age: 71
End: 2021-12-15

## 2021-12-15 ENCOUNTER — INFUSION (OUTPATIENT)
Dept: ONCOLOGY | Facility: HOSPITAL | Age: 71
End: 2021-12-15

## 2021-12-15 VITALS
RESPIRATION RATE: 16 BRPM | DIASTOLIC BLOOD PRESSURE: 48 MMHG | TEMPERATURE: 97.9 F | OXYGEN SATURATION: 98 % | SYSTOLIC BLOOD PRESSURE: 164 MMHG | HEART RATE: 54 BPM

## 2021-12-15 DIAGNOSIS — N18.4 CRD (CHRONIC RENAL DISEASE), STAGE IV (HCC): Primary | ICD-10-CM

## 2021-12-15 DIAGNOSIS — D63.1 ANEMIA DUE TO STAGE 4 CHRONIC KIDNEY DISEASE (HCC): ICD-10-CM

## 2021-12-15 DIAGNOSIS — N18.4 ANEMIA DUE TO STAGE 4 CHRONIC KIDNEY DISEASE (HCC): ICD-10-CM

## 2021-12-15 PROCEDURE — 25010000002 FERRIC CARBOXYMALTOSE 750 MG/15ML SOLUTION 15 ML VIAL: Performed by: INTERNAL MEDICINE

## 2021-12-15 PROCEDURE — 96365 THER/PROPH/DIAG IV INF INIT: CPT

## 2021-12-15 RX ORDER — SODIUM CHLORIDE 9 MG/ML
250 INJECTION, SOLUTION INTRAVENOUS ONCE
Status: COMPLETED | OUTPATIENT
Start: 2021-12-15 | End: 2021-12-15

## 2021-12-15 RX ADMIN — FERRIC CARBOXYMALTOSE INJECTION 750 MG: 50 INJECTION, SOLUTION INTRAVENOUS at 14:49

## 2021-12-15 RX ADMIN — SODIUM CHLORIDE 250 ML: 9 INJECTION, SOLUTION INTRAVENOUS at 14:48

## 2021-12-22 ENCOUNTER — LAB (OUTPATIENT)
Dept: LAB | Facility: HOSPITAL | Age: 71
End: 2021-12-22

## 2021-12-22 ENCOUNTER — INFUSION (OUTPATIENT)
Dept: ONCOLOGY | Facility: HOSPITAL | Age: 71
End: 2021-12-22

## 2021-12-22 VITALS
SYSTOLIC BLOOD PRESSURE: 176 MMHG | WEIGHT: 167 LBS | TEMPERATURE: 98.6 F | HEIGHT: 72 IN | DIASTOLIC BLOOD PRESSURE: 53 MMHG | HEART RATE: 56 BPM | BODY MASS INDEX: 22.62 KG/M2 | RESPIRATION RATE: 18 BRPM | OXYGEN SATURATION: 98 %

## 2021-12-22 DIAGNOSIS — D63.1 ANEMIA DUE TO STAGE 4 CHRONIC KIDNEY DISEASE (HCC): Primary | ICD-10-CM

## 2021-12-22 DIAGNOSIS — D64.9 SYMPTOMATIC ANEMIA: Primary | ICD-10-CM

## 2021-12-22 DIAGNOSIS — N18.4 ANEMIA DUE TO STAGE 4 CHRONIC KIDNEY DISEASE (HCC): Primary | ICD-10-CM

## 2021-12-22 LAB
BASOPHILS # BLD AUTO: 0.03 10*3/MM3 (ref 0–0.2)
BASOPHILS NFR BLD AUTO: 0.4 % (ref 0–1.5)
DEPRECATED RDW RBC AUTO: 57.3 FL (ref 37–54)
EOSINOPHIL # BLD AUTO: 0.24 10*3/MM3 (ref 0–0.4)
EOSINOPHIL NFR BLD AUTO: 3.6 % (ref 0.3–6.2)
ERYTHROCYTE [DISTWIDTH] IN BLOOD BY AUTOMATED COUNT: 16.5 % (ref 12.3–15.4)
FERRITIN SERPL-MCNC: 777.3 NG/ML (ref 30–400)
HCT VFR BLD AUTO: 29.7 % (ref 37.5–51)
HGB BLD-MCNC: 8.9 G/DL (ref 13–17.7)
HOLD SPECIMEN: NORMAL
IMM GRANULOCYTES # BLD AUTO: 0.03 10*3/MM3 (ref 0–0.05)
IMM GRANULOCYTES NFR BLD AUTO: 0.4 % (ref 0–0.5)
IRON 24H UR-MRATE: 130 MCG/DL (ref 59–158)
IRON SATN MFR SERPL: 44 % (ref 20–50)
LYMPHOCYTES # BLD AUTO: 1.35 10*3/MM3 (ref 0.7–3.1)
LYMPHOCYTES NFR BLD AUTO: 20 % (ref 19.6–45.3)
MCH RBC QN AUTO: 28.4 PG (ref 26.6–33)
MCHC RBC AUTO-ENTMCNC: 30 G/DL (ref 31.5–35.7)
MCV RBC AUTO: 94.9 FL (ref 79–97)
MONOCYTES # BLD AUTO: 0.54 10*3/MM3 (ref 0.1–0.9)
MONOCYTES NFR BLD AUTO: 8 % (ref 5–12)
NEUTROPHILS NFR BLD AUTO: 4.56 10*3/MM3 (ref 1.7–7)
NEUTROPHILS NFR BLD AUTO: 67.6 % (ref 42.7–76)
NRBC BLD AUTO-RTO: 0 /100 WBC (ref 0–0.2)
PLATELET # BLD AUTO: 155 10*3/MM3 (ref 140–450)
PMV BLD AUTO: 9.7 FL (ref 6–12)
RBC # BLD AUTO: 3.13 10*6/MM3 (ref 4.14–5.8)
TIBC SERPL-MCNC: 295 MCG/DL (ref 298–536)
TRANSFERRIN SERPL-MCNC: 198 MG/DL (ref 200–360)
WBC NRBC COR # BLD: 6.75 10*3/MM3 (ref 3.4–10.8)

## 2021-12-22 PROCEDURE — 82728 ASSAY OF FERRITIN: CPT | Performed by: INTERNAL MEDICINE

## 2021-12-22 PROCEDURE — 85025 COMPLETE CBC W/AUTO DIFF WBC: CPT

## 2021-12-22 PROCEDURE — 25010000002 EPOETIN ALFA-EPBX 40000 UNIT/ML SOLUTION: Performed by: INTERNAL MEDICINE

## 2021-12-22 PROCEDURE — 36415 COLL VENOUS BLD VENIPUNCTURE: CPT

## 2021-12-22 PROCEDURE — 83540 ASSAY OF IRON: CPT | Performed by: INTERNAL MEDICINE

## 2021-12-22 PROCEDURE — 84466 ASSAY OF TRANSFERRIN: CPT | Performed by: INTERNAL MEDICINE

## 2021-12-22 PROCEDURE — 96372 THER/PROPH/DIAG INJ SC/IM: CPT

## 2021-12-22 RX ADMIN — EPOETIN ALFA-EPBX 40000 UNITS: 40000 INJECTION, SOLUTION INTRAVENOUS; SUBCUTANEOUS at 10:45

## 2021-12-29 ENCOUNTER — INFUSION (OUTPATIENT)
Dept: ONCOLOGY | Facility: HOSPITAL | Age: 71
End: 2021-12-29

## 2021-12-29 ENCOUNTER — LAB (OUTPATIENT)
Dept: LAB | Facility: HOSPITAL | Age: 71
End: 2021-12-29

## 2021-12-29 VITALS — HEIGHT: 72 IN | WEIGHT: 167 LBS | BODY MASS INDEX: 22.62 KG/M2

## 2021-12-29 DIAGNOSIS — D63.1 ANEMIA DUE TO STAGE 4 CHRONIC KIDNEY DISEASE: Primary | ICD-10-CM

## 2021-12-29 DIAGNOSIS — N18.4 ANEMIA DUE TO STAGE 4 CHRONIC KIDNEY DISEASE: Primary | ICD-10-CM

## 2021-12-29 LAB
BASOPHILS # BLD AUTO: 0.05 10*3/MM3 (ref 0–0.2)
BASOPHILS NFR BLD AUTO: 0.8 % (ref 0–1.5)
DEPRECATED RDW RBC AUTO: 61.6 FL (ref 37–54)
EOSINOPHIL # BLD AUTO: 0.3 10*3/MM3 (ref 0–0.4)
EOSINOPHIL NFR BLD AUTO: 5 % (ref 0.3–6.2)
ERYTHROCYTE [DISTWIDTH] IN BLOOD BY AUTOMATED COUNT: 18.3 % (ref 12.3–15.4)
HCT VFR BLD AUTO: 31.4 % (ref 37.5–51)
HGB BLD-MCNC: 10.1 G/DL (ref 13–17.7)
HOLD SPECIMEN: NORMAL
IMM GRANULOCYTES # BLD AUTO: 0.05 10*3/MM3 (ref 0–0.05)
IMM GRANULOCYTES NFR BLD AUTO: 0.8 % (ref 0–0.5)
LYMPHOCYTES # BLD AUTO: 1.44 10*3/MM3 (ref 0.7–3.1)
LYMPHOCYTES NFR BLD AUTO: 24.2 % (ref 19.6–45.3)
MCH RBC QN AUTO: 30.2 PG (ref 26.6–33)
MCHC RBC AUTO-ENTMCNC: 32.2 G/DL (ref 31.5–35.7)
MCV RBC AUTO: 94 FL (ref 79–97)
MONOCYTES # BLD AUTO: 0.57 10*3/MM3 (ref 0.1–0.9)
MONOCYTES NFR BLD AUTO: 9.6 % (ref 5–12)
NEUTROPHILS NFR BLD AUTO: 3.54 10*3/MM3 (ref 1.7–7)
NEUTROPHILS NFR BLD AUTO: 59.6 % (ref 42.7–76)
NRBC BLD AUTO-RTO: 0 /100 WBC (ref 0–0.2)
PLATELET # BLD AUTO: 154 10*3/MM3 (ref 140–450)
PMV BLD AUTO: 10 FL (ref 6–12)
RBC # BLD AUTO: 3.34 10*6/MM3 (ref 4.14–5.8)
WBC NRBC COR # BLD: 5.95 10*3/MM3 (ref 3.4–10.8)

## 2021-12-29 PROCEDURE — G0463 HOSPITAL OUTPT CLINIC VISIT: HCPCS

## 2021-12-29 PROCEDURE — 36415 COLL VENOUS BLD VENIPUNCTURE: CPT

## 2021-12-29 PROCEDURE — 85025 COMPLETE CBC W/AUTO DIFF WBC: CPT

## 2022-01-10 ENCOUNTER — OFFICE VISIT (OUTPATIENT)
Dept: ONCOLOGY | Facility: CLINIC | Age: 72
End: 2022-01-10

## 2022-01-10 ENCOUNTER — LAB (OUTPATIENT)
Dept: LAB | Facility: HOSPITAL | Age: 72
End: 2022-01-10

## 2022-01-10 VITALS
WEIGHT: 170 LBS | RESPIRATION RATE: 16 BRPM | DIASTOLIC BLOOD PRESSURE: 68 MMHG | HEART RATE: 64 BPM | SYSTOLIC BLOOD PRESSURE: 138 MMHG | TEMPERATURE: 98 F | HEIGHT: 72 IN | BODY MASS INDEX: 23.03 KG/M2 | OXYGEN SATURATION: 98 %

## 2022-01-10 DIAGNOSIS — N18.4 ANEMIA DUE TO STAGE 4 CHRONIC KIDNEY DISEASE: ICD-10-CM

## 2022-01-10 DIAGNOSIS — D63.1 ANEMIA DUE TO STAGE 4 CHRONIC KIDNEY DISEASE: ICD-10-CM

## 2022-01-10 DIAGNOSIS — D63.1 ANEMIA DUE TO STAGE 4 CHRONIC KIDNEY DISEASE: Primary | ICD-10-CM

## 2022-01-10 DIAGNOSIS — K29.61 GASTROINTESTINAL HEMORRHAGE ASSOCIATED WITH OTHER GASTRITIS: ICD-10-CM

## 2022-01-10 DIAGNOSIS — K29.61 GASTROINTESTINAL HEMORRHAGE ASSOCIATED WITH OTHER GASTRITIS: Primary | ICD-10-CM

## 2022-01-10 DIAGNOSIS — N18.4 ANEMIA DUE TO STAGE 4 CHRONIC KIDNEY DISEASE: Primary | ICD-10-CM

## 2022-01-10 LAB
ALBUMIN SERPL-MCNC: 4.3 G/DL (ref 3.5–5.2)
ALBUMIN/GLOB SERPL: 1.4 G/DL
ALP SERPL-CCNC: 191 U/L (ref 39–117)
ALT SERPL W P-5'-P-CCNC: 19 U/L (ref 1–41)
ANION GAP SERPL CALCULATED.3IONS-SCNC: 10 MMOL/L (ref 5–15)
AST SERPL-CCNC: 20 U/L (ref 1–40)
BILIRUB SERPL-MCNC: 0.3 MG/DL (ref 0–1.2)
BUN SERPL-MCNC: 50 MG/DL (ref 8–23)
BUN/CREAT SERPL: 19.4 (ref 7–25)
CALCIUM SPEC-SCNC: 8.8 MG/DL (ref 8.6–10.5)
CHLORIDE SERPL-SCNC: 108 MMOL/L (ref 98–107)
CO2 SERPL-SCNC: 21 MMOL/L (ref 22–29)
CREAT SERPL-MCNC: 2.58 MG/DL (ref 0.76–1.27)
DEPRECATED RDW RBC AUTO: 59.3 FL (ref 37–54)
ERYTHROCYTE [DISTWIDTH] IN BLOOD BY AUTOMATED COUNT: 16.5 % (ref 12.3–15.4)
FERRITIN SERPL-MCNC: 360.2 NG/ML (ref 30–400)
GFR SERPL CREATININE-BSD FRML MDRD: 25 ML/MIN/1.73
GLOBULIN UR ELPH-MCNC: 3 GM/DL
GLUCOSE SERPL-MCNC: 133 MG/DL (ref 65–99)
HCT VFR BLD AUTO: 36.9 % (ref 37.5–51)
HGB BLD-MCNC: 11 G/DL (ref 13–17.7)
HOLD SPECIMEN: NORMAL
IRON 24H UR-MRATE: 111 MCG/DL (ref 59–158)
IRON SATN MFR SERPL: 40 % (ref 20–50)
MCH RBC QN AUTO: 29.1 PG (ref 26.6–33)
MCHC RBC AUTO-ENTMCNC: 29.8 G/DL (ref 31.5–35.7)
MCV RBC AUTO: 97.6 FL (ref 79–97)
PLATELET # BLD AUTO: 116 10*3/MM3 (ref 140–450)
PMV BLD AUTO: 9.5 FL (ref 6–12)
POTASSIUM SERPL-SCNC: 4.8 MMOL/L (ref 3.5–5.2)
PROT SERPL-MCNC: 7.3 G/DL (ref 6–8.5)
RBC # BLD AUTO: 3.78 10*6/MM3 (ref 4.14–5.8)
SODIUM SERPL-SCNC: 139 MMOL/L (ref 136–145)
TIBC SERPL-MCNC: 279 MCG/DL (ref 298–536)
TRANSFERRIN SERPL-MCNC: 187 MG/DL (ref 200–360)
WBC NRBC COR # BLD: 5.65 10*3/MM3 (ref 3.4–10.8)

## 2022-01-10 PROCEDURE — 85027 COMPLETE CBC AUTOMATED: CPT

## 2022-01-10 PROCEDURE — 83540 ASSAY OF IRON: CPT

## 2022-01-10 PROCEDURE — 36415 COLL VENOUS BLD VENIPUNCTURE: CPT

## 2022-01-10 PROCEDURE — 82728 ASSAY OF FERRITIN: CPT

## 2022-01-10 PROCEDURE — 84466 ASSAY OF TRANSFERRIN: CPT

## 2022-01-10 PROCEDURE — 80053 COMPREHEN METABOLIC PANEL: CPT

## 2022-01-10 PROCEDURE — 99214 OFFICE O/P EST MOD 30 MIN: CPT | Performed by: INTERNAL MEDICINE

## 2022-01-31 ENCOUNTER — LAB (OUTPATIENT)
Dept: LAB | Facility: HOSPITAL | Age: 72
End: 2022-01-31

## 2022-01-31 DIAGNOSIS — K29.61 GASTROINTESTINAL HEMORRHAGE ASSOCIATED WITH OTHER GASTRITIS: ICD-10-CM

## 2022-01-31 DIAGNOSIS — D63.1 ANEMIA DUE TO STAGE 4 CHRONIC KIDNEY DISEASE: ICD-10-CM

## 2022-01-31 DIAGNOSIS — N18.4 ANEMIA DUE TO STAGE 4 CHRONIC KIDNEY DISEASE: ICD-10-CM

## 2022-01-31 LAB
DEPRECATED RDW RBC AUTO: 51.8 FL (ref 37–54)
ERYTHROCYTE [DISTWIDTH] IN BLOOD BY AUTOMATED COUNT: 15.1 % (ref 12.3–15.4)
FERRITIN SERPL-MCNC: 312.5 NG/ML (ref 30–400)
HCT VFR BLD AUTO: 31 % (ref 37.5–51)
HGB BLD-MCNC: 10 G/DL (ref 13–17.7)
IRON 24H UR-MRATE: 88 MCG/DL (ref 59–158)
IRON SATN MFR SERPL: 35 % (ref 20–50)
MCH RBC QN AUTO: 30.1 PG (ref 26.6–33)
MCHC RBC AUTO-ENTMCNC: 32.3 G/DL (ref 31.5–35.7)
MCV RBC AUTO: 93.4 FL (ref 79–97)
PLATELET # BLD AUTO: 103 10*3/MM3 (ref 140–450)
PMV BLD AUTO: 10.4 FL (ref 6–12)
RBC # BLD AUTO: 3.32 10*6/MM3 (ref 4.14–5.8)
TIBC SERPL-MCNC: 253 MCG/DL (ref 298–536)
TRANSFERRIN SERPL-MCNC: 170 MG/DL (ref 200–360)
WBC NRBC COR # BLD: 6.51 10*3/MM3 (ref 3.4–10.8)

## 2022-01-31 PROCEDURE — 82728 ASSAY OF FERRITIN: CPT

## 2022-01-31 PROCEDURE — 84466 ASSAY OF TRANSFERRIN: CPT

## 2022-01-31 PROCEDURE — 85027 COMPLETE CBC AUTOMATED: CPT

## 2022-01-31 PROCEDURE — 36415 COLL VENOUS BLD VENIPUNCTURE: CPT

## 2022-01-31 PROCEDURE — 83540 ASSAY OF IRON: CPT

## 2022-01-31 RX ORDER — MESALAMINE 0.38 G/1
CAPSULE, EXTENDED RELEASE ORAL
Qty: 360 CAPSULE | Refills: 3 | Status: SHIPPED | OUTPATIENT
Start: 2022-01-31 | End: 2022-09-13 | Stop reason: SDUPTHER

## 2022-02-01 DIAGNOSIS — E61.1 LOW IRON: ICD-10-CM

## 2022-02-01 DIAGNOSIS — D63.1 ANEMIA DUE TO STAGE 4 CHRONIC KIDNEY DISEASE: Primary | ICD-10-CM

## 2022-02-01 DIAGNOSIS — K29.61 GASTROINTESTINAL HEMORRHAGE ASSOCIATED WITH OTHER GASTRITIS: ICD-10-CM

## 2022-02-01 DIAGNOSIS — N18.4 ANEMIA DUE TO STAGE 4 CHRONIC KIDNEY DISEASE: Primary | ICD-10-CM

## 2022-02-05 DIAGNOSIS — I73.9 PAD (PERIPHERAL ARTERY DISEASE): ICD-10-CM

## 2022-02-07 RX ORDER — METOPROLOL SUCCINATE 25 MG/1
TABLET, EXTENDED RELEASE ORAL
Qty: 90 TABLET | Refills: 0 | Status: SHIPPED | OUTPATIENT
Start: 2022-02-07 | End: 2022-05-09

## 2022-02-07 RX ORDER — ATORVASTATIN CALCIUM 10 MG/1
10 TABLET, FILM COATED ORAL DAILY
Qty: 90 TABLET | Refills: 0 | Status: SHIPPED | OUTPATIENT
Start: 2022-02-07 | End: 2022-05-05

## 2022-02-08 RX ORDER — OMEPRAZOLE 20 MG/1
CAPSULE, DELAYED RELEASE ORAL
Qty: 90 CAPSULE | Refills: 1 | Status: SHIPPED | OUTPATIENT
Start: 2022-02-08

## 2022-02-08 RX ORDER — AZELASTINE 1 MG/ML
SPRAY, METERED NASAL
Qty: 90 ML | Refills: 1 | Status: SHIPPED | OUTPATIENT
Start: 2022-02-08

## 2022-02-15 ENCOUNTER — OFFICE VISIT (OUTPATIENT)
Dept: INTERNAL MEDICINE | Facility: CLINIC | Age: 72
End: 2022-02-15

## 2022-02-15 ENCOUNTER — LAB (OUTPATIENT)
Dept: LAB | Facility: HOSPITAL | Age: 72
End: 2022-02-15

## 2022-02-15 VITALS
HEIGHT: 72 IN | TEMPERATURE: 97.3 F | WEIGHT: 167 LBS | DIASTOLIC BLOOD PRESSURE: 78 MMHG | SYSTOLIC BLOOD PRESSURE: 200 MMHG | OXYGEN SATURATION: 99 % | HEART RATE: 62 BPM | BODY MASS INDEX: 22.62 KG/M2

## 2022-02-15 DIAGNOSIS — D50.0 ANEMIA DUE TO GI BLOOD LOSS: ICD-10-CM

## 2022-02-15 DIAGNOSIS — K50.118 CROHN'S DISEASE OF LARGE INTESTINE WITH OTHER COMPLICATION: ICD-10-CM

## 2022-02-15 DIAGNOSIS — D63.1 ANEMIA DUE TO STAGE 4 CHRONIC KIDNEY DISEASE: ICD-10-CM

## 2022-02-15 DIAGNOSIS — N18.4 ANEMIA DUE TO STAGE 4 CHRONIC KIDNEY DISEASE: ICD-10-CM

## 2022-02-15 DIAGNOSIS — N17.9 ACUTE RENAL FAILURE SUPERIMPOSED ON STAGE 4 CHRONIC KIDNEY DISEASE, UNSPECIFIED ACUTE RENAL FAILURE TYPE: ICD-10-CM

## 2022-02-15 DIAGNOSIS — N18.4 ACUTE RENAL FAILURE SUPERIMPOSED ON STAGE 4 CHRONIC KIDNEY DISEASE, UNSPECIFIED ACUTE RENAL FAILURE TYPE: ICD-10-CM

## 2022-02-15 DIAGNOSIS — C91.10 CLL (CHRONIC LYMPHOCYTIC LEUKEMIA): ICD-10-CM

## 2022-02-15 DIAGNOSIS — N18.4 CKD (CHRONIC KIDNEY DISEASE) STAGE 4, GFR 15-29 ML/MIN: ICD-10-CM

## 2022-02-15 DIAGNOSIS — K29.61 GASTROINTESTINAL HEMORRHAGE ASSOCIATED WITH OTHER GASTRITIS: ICD-10-CM

## 2022-02-15 DIAGNOSIS — I25.9 IHD (ISCHEMIC HEART DISEASE): ICD-10-CM

## 2022-02-15 DIAGNOSIS — I27.20 PULMONARY HYPERTENSION: Primary | ICD-10-CM

## 2022-02-15 LAB
DEPRECATED RDW RBC AUTO: 51.4 FL (ref 37–54)
ERYTHROCYTE [DISTWIDTH] IN BLOOD BY AUTOMATED COUNT: 15.2 % (ref 12.3–15.4)
FERRITIN SERPL-MCNC: 303.8 NG/ML (ref 30–400)
HCT VFR BLD AUTO: 31.2 % (ref 37.5–51)
HGB BLD-MCNC: 9.9 G/DL (ref 13–17.7)
IRON 24H UR-MRATE: 132 MCG/DL (ref 59–158)
IRON SATN MFR SERPL: 49 % (ref 20–50)
MCH RBC QN AUTO: 29.6 PG (ref 26.6–33)
MCHC RBC AUTO-ENTMCNC: 31.7 G/DL (ref 31.5–35.7)
MCV RBC AUTO: 93.1 FL (ref 79–97)
PLATELET # BLD AUTO: 121 10*3/MM3 (ref 140–450)
PMV BLD AUTO: 10 FL (ref 6–12)
RBC # BLD AUTO: 3.35 10*6/MM3 (ref 4.14–5.8)
TIBC SERPL-MCNC: 268 MCG/DL (ref 298–536)
TRANSFERRIN SERPL-MCNC: 180 MG/DL (ref 200–360)
WBC NRBC COR # BLD: 7.8 10*3/MM3 (ref 3.4–10.8)

## 2022-02-15 PROCEDURE — 99214 OFFICE O/P EST MOD 30 MIN: CPT | Performed by: FAMILY MEDICINE

## 2022-02-15 PROCEDURE — 36415 COLL VENOUS BLD VENIPUNCTURE: CPT

## 2022-02-15 PROCEDURE — 85027 COMPLETE CBC AUTOMATED: CPT

## 2022-02-15 PROCEDURE — 80053 COMPREHEN METABOLIC PANEL: CPT

## 2022-02-15 PROCEDURE — 83540 ASSAY OF IRON: CPT

## 2022-02-15 PROCEDURE — 84466 ASSAY OF TRANSFERRIN: CPT

## 2022-02-15 PROCEDURE — 82728 ASSAY OF FERRITIN: CPT

## 2022-02-15 NOTE — PROGRESS NOTES
Subjective     Chief Complaint   Patient presents with   • Hypertension     3 month follow up    • Ear Problem     both ears stopped up, going to see Dr. Mejia on 03/01/2022 and put tubes in        History of Present Illness  Patient presents today for follow-up visit.  He feels he is doing well.  He is scheduled to see Dr. Mejia soon and have myringotomy tubes placed.  He notes he does have some difficulty hearing.  His blood pressure is elevated today.  Notes he just took his medications.  He is confident that it will return to normal 130/70 range.  He has no chest pain.  He is watching his diet for cholesterol.  He has no undue fatigue.  He is staying busy.  Tolerating his medications.  Patient's PMR from outside medical facility reviewed and noted.    Review of Systems     Otherwise complete ROS reviewed and negative except as mentioned in the HPI.    Past Medical History:   Past Medical History:   Diagnosis Date   • 3-vessel CAD 8/11/2020   • Allergic rhinitis    • Anxiety disorder 4/27/2020   • Arthritis    • Asymmetrical sensorineural hearing loss 6/28/2017   • Atherosclerosis of native artery of both lower extremities with intermittent claudication ([facility]) 7/18/2019   • Avascular necrosis of femoral head, left ([facility]) 07/11/2020    right hip after surgery   • Carotid stenosis    • Chronic mucoid otitis media    • Chronic rhinitis    • Coronary artery disease     HEART BYPASS 2004   • Crohn's disease of large intestine with other complication ([facility]) 7/30/2020    Chronic diarrhea Colonoscopy July 2020 revealed mild patchy scattered hemosiderin staining with inflammation more so in rectosigmoid area.  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   • Iron deficiency anemia 7/14/2020   • Mixed hearing loss of left ear    • PAD (peripheral artery disease) ([facility]) 8/11/2020   • Perianal abscess    • Pernicious anemia 08/17/2020    took shots but never diagnosed with b12 deficiency   • Personal history of alcoholism ([facility]) 08/11/2020    quit drinking in 2013   • Prostatic hypertrophy 8/11/2020   • Sensorineural hearing loss    • Sepsis with acute renal failure ([facility]) 9/15/2020   • Shortness of breath 5/27/2021   • Tinnitus    • Ventricular tachycardia, nonsustained ([facility]) 7/14/2020   • Weight loss 7/11/2020     Past Surgical History:  Past Surgical History:   Procedure Laterality Date   • ARTERY SURGERY  2021    right side on neck   • CAROTID ENDARTERECTOMY Right 5/10/2021    Procedure: RIGHT CAROTID ENDARTERECTOMY WITH EEG;  Surgeon: Gil Pineda DO;  Location: [facility] OR ;  Service: Vascular;  Laterality: Right;   • COLONOSCOPY N/A 7/2/2020    Procedure: COLONOSCOPY WITH ANESTHESIA;  Surgeon: Adrien Brewster MD;  Location: [facility] ENDOSCOPY;  Service: Gastroenterology;  Laterality: N/A;  pre op: diarrhea  post op: polyps  PCP: Joe Velasco MD   • COLONOSCOPY N/A 10/13/2020    Procedure: COLONOSCOPY WITH ANESTHESIA;  Surgeon: Adrien Brewster MD;  Location: [facility] ENDOSCOPY;  Service: Gastroenterology;  Laterality: N/A;  Pre: Chronic Diarrhea, Crohn's  Post: AVM  Dr. Neftali Velasco  CO2 Inflation Used   • CORONARY ARTERY BYPASS GRAFT  2003    x3   • ENDOSCOPY N/A 11/2/2021    Procedure: ESOPHAGOGASTRODUODENOSCOPY WITH ANESTHESIA;  Surgeon: Bridger Bell MD;  Location: [facility] ENDOSCOPY;  Service: Gastroenterology;  Laterality: N/A;  pre anemia;gi bleed  post  gi bleed;schatski ring  Dr. ERIC Velasco   • EYE SURGERY Bilateral     catorac   • INCISION AND DRAINAGE PERIRECTAL ABSCESS N/A 3/3/2017    Procedure: INCISION AND DRAINAGE OF JEET ANAL ABSCESS;  Surgeon:  Lynette Smith MD;  Location: [facility] OR;  Service:    • MYRINGOTOMY W/ TUBES Left 04/17/2017    06/10/2016   • TONSILLECTOMY     • TOTAL HIP ARTHROPLASTY Right 2006     Social History:  reports that he quit smoking about 8 years ago. His smoking use included cigarettes. He started smoking about 34 years ago. He has a 12.50 pack-year smoking history. He has never used smokeless tobacco. He reports previous alcohol use. He reports that he does not use drugs.    Family History: family history includes No Known Problems in his daughter, father, and mother.       Allergies:  Allergies   Allergen Reactions   • Ondansetron Anaphylaxis and GI Intolerance   • Zofran [Ondansetron Hcl] Anaphylaxis   • Lortab [Hydrocodone-Acetaminophen] Other (See Comments) and Hallucinations     CLOSTROPHOBIC   • Allopurinol Other (See Comments)     Pain on right side     Medications:  Prior to Admission medications    Medication Sig Start Date End Date Taking? Authorizing Provider   albuterol sulfate  (90 Base) MCG/ACT inhaler USE 2 INHALATIONS FOUR TIMES A DAY AS NEEDED 7/12/21  Yes Tahira Mendez APRN   ALPRAZolam (Xanax) 0.25 MG tablet Take 1 tablet by mouth At Night As Needed for Anxiety. 10/25/21  Yes Joe Velasco MD   amLODIPine (NORVASC) 10 MG tablet TAKE 1 TABLET DAILY 8/2/21  Yes Joe Velasco MD   aspirin (ASPIR) 81 MG EC tablet Take  by mouth Daily.   Yes ProviderTwyla MD   atorvastatin (LIPITOR) 10 MG tablet TAKE 1 TABLET BY MOUTH DAILY 2/7/22  Yes Eleuterio Farley MD   azelastine (ASTELIN) 0.1 % nasal spray USE 1 SPRAY IN EACH NOSTRIL TWICE A DAY AS NEEDED 2/8/22  Yes Tahira Mendez APRN   cholestyramine (QUESTRAN) 4 g packet TAKE 1 PACKET BY MOUTH DAILY 9/7/21  Yes Adrien Brewster MD   cloNIDine (CATAPRES) 0.2 MG tablet TAKE 3 TABLETS DAILY 9/7/21  Yes Joe Velasco MD   clopidogrel (PLAVIX) 75 MG tablet Take 75 mg by mouth Daily.   Yes ProviderTwyla MD    Copper Gluconate 2 MG tablet Take 2 mg by mouth Daily. 7/6/21  Yes Goldberg, Amit, MD   diphenhydrAMINE (Benadryl Allergy) 25 mg capsule Take 25 mg by mouth Every 6 (Six) Hours As Needed for Itching.   Yes Twyla Guevara MD   fexofenadine (ALLEGRA) 180 MG tablet Take 180 mg by mouth Daily.   Yes Twyla Guevara MD   fluticasone (FLONASE) 50 MCG/ACT nasal spray 2 sprays into the nostril(s) as directed by provider Daily As Needed for Rhinitis.   Yes ProviderTwyla MD   furosemide (Lasix) 20 MG tablet Take 1 tablet by mouth Daily. 9/2/21  Yes Joe Velasco MD   hydrALAZINE (APRESOLINE) 25 MG tablet Take 1 tablet by mouth 3 (Three) Times a Day. 10/25/21  Yes Joe Velasco MD   levothyroxine (Synthroid) 75 MCG tablet Take 1 tablet by mouth Daily. 5/7/21  Yes Tahira Mendez APRN   magnesium chloride ER 64 MG DR tablet Take 143 mg by mouth Daily.   Yes ProviderTwyla MD   Melatonin 10 MG capsule Take 2 capsules by mouth Every Night.   Yes Twyla Guevara MD   mesalamine (APRISO) 0.375 g 24 hr capsule TAKE 4 CAPSULES DAILY 1/31/22  Yes Arlen Temple APRN   metoprolol succinate XL (TOPROL-XL) 25 MG 24 hr tablet TAKE 1 TABLET DAILY 2/7/22  Yes Trisha Soni DO   Multiple Vitamins-Minerals (PRESERVISION/LUTEIN) capsule Take 1 capsule by mouth 2 (two) times a day.   Yes Twyla Guevara MD   omeprazole (priLOSEC) 20 MG capsule TAKE 1 CAPSULE DAILY 2/8/22  Yes Tahira Mendez APRN   potassium chloride 10 MEQ CR tablet TAKE 1 TABLET TWICE A DAY 11/29/21  Yes Tahira Mendez APRN   sodium bicarbonate 650 MG tablet Take 0.5 tablets by mouth 2 (Two) Times a Day.  Patient taking differently: Take 325 mg by mouth 3 (Three) Times a Day. 11/3/21  Yes Dc Issa DO   valsartan (DIOVAN) 160 MG tablet Take 160 mg by mouth Daily.   Yes ProviderTwyla MD   vitamin D (ERGOCALCIFEROL) 1.25 MG (14659 UT) capsule capsule Take 1 capsule  "by mouth 1 (One) Time Per Week. 9/14/21  Yes Arlen TempleSTERLING       Objective     Vital Signs: BP (!) 200/78 (BP Location: Left arm, Patient Position: Sitting, Cuff Size: Adult)   Pulse 62   Temp 97.3 °F (36.3 °C) (Temporal)   Ht 182.9 cm (72\")   Wt 75.8 kg (167 lb)   SpO2 99%   BMI 22.65 kg/m²   Physical Exam  Vitals and nursing note reviewed.   Constitutional:       Appearance: Normal appearance. He is normal weight.   HENT:      Head: Normocephalic and atraumatic.      Right Ear: Tympanic membrane, ear canal and external ear normal.      Left Ear: Tympanic membrane, ear canal and external ear normal.      Nose: Nose normal.      Mouth/Throat:      Mouth: Mucous membranes are moist.      Pharynx: Oropharynx is clear.   Eyes:      Extraocular Movements: Extraocular movements intact.      Conjunctiva/sclera: Conjunctivae normal.      Pupils: Pupils are equal, round, and reactive to light.   Cardiovascular:      Rate and Rhythm: Normal rate and regular rhythm.      Pulses: Normal pulses.      Heart sounds: Normal heart sounds.   Pulmonary:      Effort: Pulmonary effort is normal.      Breath sounds: Normal breath sounds.   Abdominal:      General: Bowel sounds are normal.      Palpations: Abdomen is soft.   Musculoskeletal:         General: Normal range of motion.      Cervical back: Normal range of motion and neck supple.   Skin:     General: Skin is warm and dry.      Capillary Refill: Capillary refill takes less than 2 seconds.   Neurological:      General: No focal deficit present.      Mental Status: He is alert and oriented to person, place, and time.      Cranial Nerves: No cranial nerve deficit.   Psychiatric:         Mood and Affect: Mood normal.         Behavior: Behavior normal.         Thought Content: Thought content normal.         Patient's Body mass index is 22.65 kg/m². indicating that he is within normal range (BMI 18.5-24.9). No BMI management plan needed..      Results Reviewed:  Glucose "   Date Value Ref Range Status   01/10/2022 133 (H) 65 - 99 mg/dL Final     BUN   Date Value Ref Range Status   01/10/2022 50 (H) 8 - 23 mg/dL Final     Creatinine   Date Value Ref Range Status   01/10/2022 2.58 (H) 0.76 - 1.27 mg/dL Final   04/12/2021 3.10 (H) 0.60 - 1.30 mg/dL Final     Comment:     Serial Number: [id number]:  [id number]     Sodium   Date Value Ref Range Status   01/10/2022 139 136 - 145 mmol/L Final     Potassium   Date Value Ref Range Status   01/10/2022 4.8 3.5 - 5.2 mmol/L Final     Chloride   Date Value Ref Range Status   01/10/2022 108 (H) 98 - 107 mmol/L Final     CO2   Date Value Ref Range Status   01/10/2022 21.0 (L) 22.0 - 29.0 mmol/L Final     Calcium   Date Value Ref Range Status   01/10/2022 8.8 8.6 - 10.5 mg/dL Final     ALT (SGPT)   Date Value Ref Range Status   01/10/2022 19 1 - 41 U/L Final     AST (SGOT)   Date Value Ref Range Status   01/10/2022 20 1 - 40 U/L Final     WBC   Date Value Ref Range Status   02/15/2022 7.80 3.40 - 10.80 10*3/mm3 Final   03/11/2021 5.06 3.40 - 10.80 10*3/mm3 Final     Hematocrit   Date Value Ref Range Status   02/15/2022 31.2 (L) 37.5 - 51.0 % Final     Platelets   Date Value Ref Range Status   02/15/2022 121 (L) 140 - 450 10*3/mm3 Final     Total Cholesterol   Date Value Ref Range Status   10/29/2021 59 0 - 200 mg/dL Final     Triglycerides   Date Value Ref Range Status   10/29/2021 97 0 - 150 mg/dL Final     HDL Cholesterol   Date Value Ref Range Status   10/29/2021 23 (L) 40 - 60 mg/dL Final     LDL Cholesterol    Date Value Ref Range Status   10/29/2021 17 0 - 100 mg/dL Final     LDL Chol Calc (NIH)   Date Value Ref Range Status   03/11/2021 66 0 - 100 mg/dL Final     LDL/HDL Ratio   Date Value Ref Range Status   10/29/2021 0.72  Final     Hemoglobin A1C   Date Value Ref Range Status   10/29/2021 4.80 4.80 - 5.60 % Final         Assessment / Plan     Assessment/Plan:  1. Pulmonary hypertension (HCC)  Continue current medications    2. Anemia due  to stage 4 chronic kidney disease (HCC)  Monitor cbc and cmp    3. CLL (chronic lymphocytic leukemia) (HCC)  Follow with onc    4. Acute renal failure superimposed on stage 4 chronic kidney disease, unspecified acute renal failure type (HCC)  Follow with nephrology    5. Crohn's disease of large intestine with other complication (HCC)  Continue current medications    6. IHD (ischemic heart disease)  Control blood pressure  If chest pain then call     7.  Hypertension primary benign.  Continue current medications  Goal  Less than 130/4080    Return in about 6 months (around 8/15/2022). unless patient needs to be seen sooner or acute issues arise.  Labs reviewed with the patient.    I have discussed the patient results/orders and and plan/recommendation with them at today's visit.      Trisha Soni,    02/15/2022

## 2022-02-16 LAB
ALBUMIN SERPL-MCNC: 4.2 G/DL (ref 3.5–5.2)
ALBUMIN/GLOB SERPL: 1.4 G/DL
ALP SERPL-CCNC: 183 U/L (ref 39–117)
ALT SERPL W P-5'-P-CCNC: 20 U/L (ref 1–41)
ANION GAP SERPL CALCULATED.3IONS-SCNC: 16 MMOL/L (ref 5–15)
AST SERPL-CCNC: 18 U/L (ref 1–40)
BILIRUB SERPL-MCNC: 0.3 MG/DL (ref 0–1.2)
BUN SERPL-MCNC: 56 MG/DL (ref 8–23)
BUN/CREAT SERPL: 20.6 (ref 7–25)
CALCIUM SPEC-SCNC: 8.8 MG/DL (ref 8.6–10.5)
CHLORIDE SERPL-SCNC: 105 MMOL/L (ref 98–107)
CO2 SERPL-SCNC: 17 MMOL/L (ref 22–29)
CREAT SERPL-MCNC: 2.72 MG/DL (ref 0.76–1.27)
GFR SERPL CREATININE-BSD FRML MDRD: 23 ML/MIN/1.73
GLOBULIN UR ELPH-MCNC: 2.9 GM/DL
GLUCOSE SERPL-MCNC: 105 MG/DL (ref 65–99)
POTASSIUM SERPL-SCNC: 4.7 MMOL/L (ref 3.5–5.2)
PROT SERPL-MCNC: 7.1 G/DL (ref 6–8.5)
SODIUM SERPL-SCNC: 138 MMOL/L (ref 136–145)

## 2022-02-17 ENCOUNTER — TELEPHONE (OUTPATIENT)
Dept: ONCOLOGY | Facility: CLINIC | Age: 72
End: 2022-02-17

## 2022-02-17 ENCOUNTER — INFUSION (OUTPATIENT)
Dept: ONCOLOGY | Facility: HOSPITAL | Age: 72
End: 2022-02-17

## 2022-02-17 VITALS
BODY MASS INDEX: 22.75 KG/M2 | TEMPERATURE: 97.9 F | HEIGHT: 72 IN | HEART RATE: 51 BPM | WEIGHT: 168 LBS | DIASTOLIC BLOOD PRESSURE: 59 MMHG | OXYGEN SATURATION: 97 % | RESPIRATION RATE: 18 BRPM | SYSTOLIC BLOOD PRESSURE: 165 MMHG

## 2022-02-17 DIAGNOSIS — N18.4 ANEMIA DUE TO STAGE 4 CHRONIC KIDNEY DISEASE: Primary | ICD-10-CM

## 2022-02-17 DIAGNOSIS — D63.1 ANEMIA DUE TO STAGE 4 CHRONIC KIDNEY DISEASE: Primary | ICD-10-CM

## 2022-02-17 PROCEDURE — 96372 THER/PROPH/DIAG INJ SC/IM: CPT

## 2022-02-17 PROCEDURE — 25010000002 EPOETIN ALFA-EPBX 40000 UNIT/ML SOLUTION: Performed by: INTERNAL MEDICINE

## 2022-02-17 RX ADMIN — EPOETIN ALFA-EPBX 40000 UNITS: 40000 INJECTION, SOLUTION INTRAVENOUS; SUBCUTANEOUS at 14:26

## 2022-02-17 NOTE — TELEPHONE ENCOUNTER
Ill be in today at 230 for retacrit.    ----- Message from Amit Goldberg, MD sent at 2/17/2022  2:44 AM CST -----  OK for EPO

## 2022-02-21 ENCOUNTER — APPOINTMENT (OUTPATIENT)
Dept: ONCOLOGY | Facility: HOSPITAL | Age: 72
End: 2022-02-21

## 2022-02-21 ENCOUNTER — APPOINTMENT (OUTPATIENT)
Dept: LAB | Facility: HOSPITAL | Age: 72
End: 2022-02-21

## 2022-02-23 ENCOUNTER — INFUSION (OUTPATIENT)
Dept: ONCOLOGY | Facility: HOSPITAL | Age: 72
End: 2022-02-23

## 2022-02-23 ENCOUNTER — LAB (OUTPATIENT)
Dept: LAB | Facility: HOSPITAL | Age: 72
End: 2022-02-23

## 2022-02-23 VITALS
TEMPERATURE: 97.2 F | HEART RATE: 52 BPM | WEIGHT: 170 LBS | RESPIRATION RATE: 16 BRPM | OXYGEN SATURATION: 99 % | DIASTOLIC BLOOD PRESSURE: 52 MMHG | BODY MASS INDEX: 23.03 KG/M2 | SYSTOLIC BLOOD PRESSURE: 165 MMHG | HEIGHT: 72 IN

## 2022-02-23 DIAGNOSIS — K29.61 GASTROINTESTINAL HEMORRHAGE ASSOCIATED WITH OTHER GASTRITIS: ICD-10-CM

## 2022-02-23 DIAGNOSIS — D63.1 ANEMIA DUE TO STAGE 4 CHRONIC KIDNEY DISEASE: ICD-10-CM

## 2022-02-23 DIAGNOSIS — E61.1 LOW IRON: ICD-10-CM

## 2022-02-23 DIAGNOSIS — N18.4 ANEMIA DUE TO STAGE 4 CHRONIC KIDNEY DISEASE: ICD-10-CM

## 2022-02-23 DIAGNOSIS — D63.1 ANEMIA DUE TO STAGE 4 CHRONIC KIDNEY DISEASE: Primary | ICD-10-CM

## 2022-02-23 DIAGNOSIS — N18.4 ANEMIA DUE TO STAGE 4 CHRONIC KIDNEY DISEASE: Primary | ICD-10-CM

## 2022-02-23 LAB
DEPRECATED RDW RBC AUTO: 53.2 FL (ref 37–54)
ERYTHROCYTE [DISTWIDTH] IN BLOOD BY AUTOMATED COUNT: 15.9 % (ref 12.3–15.4)
HCT VFR BLD AUTO: 33.8 % (ref 37.5–51)
HGB BLD-MCNC: 10.9 G/DL (ref 13–17.7)
HOLD SPECIMEN: NORMAL
MCH RBC QN AUTO: 30.5 PG (ref 26.6–33)
MCHC RBC AUTO-ENTMCNC: 32.2 G/DL (ref 31.5–35.7)
MCV RBC AUTO: 94.7 FL (ref 79–97)
PLATELET # BLD AUTO: 137 10*3/MM3 (ref 140–450)
PMV BLD AUTO: 10 FL (ref 6–12)
RBC # BLD AUTO: 3.57 10*6/MM3 (ref 4.14–5.8)
WBC NRBC COR # BLD: 5.38 10*3/MM3 (ref 3.4–10.8)

## 2022-02-23 PROCEDURE — 85027 COMPLETE CBC AUTOMATED: CPT

## 2022-02-23 PROCEDURE — G0463 HOSPITAL OUTPT CLINIC VISIT: HCPCS

## 2022-02-23 PROCEDURE — 36415 COLL VENOUS BLD VENIPUNCTURE: CPT

## 2022-02-25 DIAGNOSIS — F41.9 ANXIETY: ICD-10-CM

## 2022-02-28 ENCOUNTER — OFFICE VISIT (OUTPATIENT)
Dept: CARDIOLOGY | Facility: CLINIC | Age: 72
End: 2022-02-28

## 2022-02-28 VITALS
DIASTOLIC BLOOD PRESSURE: 61 MMHG | WEIGHT: 170 LBS | SYSTOLIC BLOOD PRESSURE: 153 MMHG | BODY MASS INDEX: 23.03 KG/M2 | HEART RATE: 51 BPM | HEIGHT: 72 IN

## 2022-02-28 DIAGNOSIS — I73.9 PAD (PERIPHERAL ARTERY DISEASE): ICD-10-CM

## 2022-02-28 DIAGNOSIS — I25.9 IHD (ISCHEMIC HEART DISEASE): Primary | ICD-10-CM

## 2022-02-28 DIAGNOSIS — I51.89 DIASTOLIC DYSFUNCTION: ICD-10-CM

## 2022-02-28 PROCEDURE — 93000 ELECTROCARDIOGRAM COMPLETE: CPT | Performed by: INTERNAL MEDICINE

## 2022-02-28 PROCEDURE — 99213 OFFICE O/P EST LOW 20 MIN: CPT | Performed by: INTERNAL MEDICINE

## 2022-02-28 RX ORDER — ALPRAZOLAM 0.25 MG/1
0.25 TABLET ORAL NIGHTLY PRN
Qty: 30 TABLET | Refills: 3 | Status: SHIPPED | OUTPATIENT
Start: 2022-02-28 | End: 2022-06-27

## 2022-02-28 NOTE — PROGRESS NOTES
"Subjective    Ricardo Hugo is a 72 y.o. male. 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 meds are stable and has no cp or unusual soa. EKG today is nsc tho there has been a mild axis change.    HTN:  meds are stable and home readings are \"130's/70's\"    HLD:  Recent LDL=17 in 10/21 on low-potent statin.        The following portions of the patient's history were reviewed and updated as appropriate: allergies, current medications, past family history, past medical history, past social history, past surgical history and problem list.    Patient Active Problem List   Diagnosis   • Chronic rhinitis   • Acute renal failure superimposed on stage 4 chronic kidney disease (HCC)   • Hyponatremia   • Acute cystitis   • Metabolic acidosis, increased anion gap   • Hypomagnesemia   • Hypokalemia   • Moderate malnutrition ([facility])   • Hypertension, benign   • Sepsis ([facility])   • Enterocolitis   • Chronic diarrhea   • UTI (urinary tract infection), bacterial   • Crohn's disease of large intestine with other complication ([facility])   • Asymmetrical hearing loss of left ear   • Symptomatic anemia   • CKD (chronic kidney disease) stage 4, GFR 15-29 ml/min ([facility])   • Bruit of right carotid artery   • Wellness examination   • Pre-op evaluation   • PAD (peripheral artery disease) ([facility])   • IHD (ischemic heart disease)   • Carotid stenosis   • Stenosis of right carotid artery   • Preop testing   • Carotid stenosis, right   • Avascular necrosis of bone of hip (HCC)   • Diastolic dysfunction   • Shortness of breath   • CRD (chronic renal disease), stage IV (HCC)   • Thrombocytopenia (HCC)   • History of alcoholism ([facility])   • Anemia due to chronic kidney disease   • Copper deficiency   • Low iron    • CLL (chronic lymphocytic leukemia) (HCC)   • Pulmonary hypertension (HCC)   • GI bleed   • Hypomagnesemia   • Hypokalemia "       Allergies   Allergen Reactions   • Ondansetron Anaphylaxis and GI Intolerance   • Zofran [Ondansetron Hcl] Anaphylaxis   • Lortab [Hydrocodone-Acetaminophen] Other (See Comments) and Hallucinations     CLOSTROPHOBIC   • Allopurinol Other (See Comments)     Pain on right side       Family History   Problem Relation Age of Onset   • No Known Problems Mother    • No Known Problems Father    • No Known Problems Daughter    • Colon cancer Neg Hx    • Colon polyps Neg Hx        Social History     Socioeconomic History   • Marital status:    Tobacco Use   • Smoking status: Former Smoker     Packs/day: 0.50     Years: 25.00     Pack years: 12.50     Types: Cigarettes     Start date:      Quit date: 10/13/2013     Years since quittin.3   • Smokeless tobacco: Never Used   • Tobacco comment: quit 2013   Vaping Use   • Vaping Use: Never used   Substance and Sexual Activity   • Alcohol use: Not Currently   • Drug use: No   • Sexual activity: Defer         Current Outpatient Medications:   •  albuterol sulfate  (90 Base) MCG/ACT inhaler, USE 2 INHALATIONS FOUR TIMES A DAY AS NEEDED, Disp: 20.1 g, Rfl: 3  •  ALPRAZolam (Xanax) 0.25 MG tablet, Take 1 tablet by mouth At Night As Needed for Anxiety., Disp: 30 tablet, Rfl: 3  •  amLODIPine (NORVASC) 10 MG tablet, TAKE 1 TABLET DAILY, Disp: 90 tablet, Rfl: 3  •  aspirin (ASPIR) 81 MG EC tablet, Take  by mouth Daily., Disp: , Rfl:   •  atorvastatin (LIPITOR) 10 MG tablet, TAKE 1 TABLET BY MOUTH DAILY, Disp: 90 tablet, Rfl: 0  •  azelastine (ASTELIN) 0.1 % nasal spray, USE 1 SPRAY IN EACH NOSTRIL TWICE A DAY AS NEEDED, Disp: 90 mL, Rfl: 1  •  cholestyramine (QUESTRAN) 4 g packet, TAKE 1 PACKET BY MOUTH DAILY, Disp: 30 each, Rfl: 5  •  cloNIDine (CATAPRES) 0.2 MG tablet, TAKE 3 TABLETS DAILY, Disp: 270 tablet, Rfl: 3  •  clopidogrel (PLAVIX) 75 MG tablet, Take 75 mg by mouth Daily., Disp: , Rfl:   •  Copper Gluconate 2 MG tablet, Take 2 mg by mouth Daily.,  Disp: 30 tablet, Rfl: 6  •  diphenhydrAMINE (Benadryl Allergy) 25 mg capsule, Take 25 mg by mouth Every 6 (Six) Hours As Needed for Itching., Disp: , Rfl:   •  fexofenadine (ALLEGRA) 180 MG tablet, Take 180 mg by mouth Daily., Disp: , Rfl:   •  fluticasone (FLONASE) 50 MCG/ACT nasal spray, 2 sprays into the nostril(s) as directed by provider Daily As Needed for Rhinitis., Disp: , Rfl:   •  furosemide (Lasix) 20 MG tablet, Take 1 tablet by mouth Daily. (Patient taking differently: Take 20 mg by mouth Daily. 2 TABLETS PER DAY), Disp: 90 tablet, Rfl: 3  •  hydrALAZINE (APRESOLINE) 25 MG tablet, Take 1 tablet by mouth 3 (Three) Times a Day., Disp: 90 tablet, Rfl: 5  •  levothyroxine (Synthroid) 75 MCG tablet, Take 1 tablet by mouth Daily., Disp: 90 tablet, Rfl: 3  •  magnesium chloride ER 64 MG DR tablet, Take 143 mg by mouth Daily., Disp: , Rfl:   •  Melatonin 10 MG capsule, Take 2 capsules by mouth Every Night., Disp: , Rfl:   •  mesalamine (APRISO) 0.375 g 24 hr capsule, TAKE 4 CAPSULES DAILY, Disp: 360 capsule, Rfl: 3  •  metoprolol succinate XL (TOPROL-XL) 25 MG 24 hr tablet, TAKE 1 TABLET DAILY, Disp: 90 tablet, Rfl: 0  •  Multiple Vitamins-Minerals (PRESERVISION/LUTEIN) capsule, Take 1 capsule by mouth 2 (two) times a day., Disp: , Rfl:   •  omeprazole (priLOSEC) 20 MG capsule, TAKE 1 CAPSULE DAILY, Disp: 90 capsule, Rfl: 1  •  potassium chloride 10 MEQ CR tablet, TAKE 1 TABLET TWICE A DAY, Disp: 180 tablet, Rfl: 3  •  sodium bicarbonate 650 MG tablet, Take 0.5 tablets by mouth 2 (Two) Times a Day. (Patient taking differently: Take 325 mg by mouth 3 (Three) Times a Day.), Disp: 180 tablet, Rfl: 3  •  valsartan (DIOVAN) 160 MG tablet, Take 160 mg by mouth Daily., Disp: , Rfl:   •  vitamin D (ERGOCALCIFEROL) 1.25 MG (16234 UT) capsule capsule, Take 1 capsule by mouth 1 (One) Time Per Week., Disp: 15 capsule, Rfl: 3    Past Surgical History:   Procedure Laterality Date   • ARTERY SURGERY  2021    right side on neck  "  • CAROTID ENDARTERECTOMY Right 5/10/2021    Procedure: RIGHT CAROTID ENDARTERECTOMY WITH EEG;  Surgeon: Gil Pineda DO;  Location: [facility] HYBRID OR 12;  Service: Vascular;  Laterality: Right;   • COLONOSCOPY N/A 7/2/2020    Procedure: COLONOSCOPY WITH ANESTHESIA;  Surgeon: Adrien Brewster MD;  Location: [facility] ENDOSCOPY;  Service: Gastroenterology;  Laterality: N/A;  pre op: diarrhea  post op: polyps  PCP: Joe Velasco MD   • COLONOSCOPY N/A 10/13/2020    Procedure: COLONOSCOPY WITH ANESTHESIA;  Surgeon: Adrien Brewster MD;  Location: [facility] ENDOSCOPY;  Service: Gastroenterology;  Laterality: N/A;  Pre: Chronic Diarrhea, Crohn's  Post: AVM  Dr. Neftali Velasco  CO2 Inflation Used   • CORONARY ARTERY BYPASS GRAFT  2003    x3   • ENDOSCOPY N/A 11/2/2021    Procedure: ESOPHAGOGASTRODUODENOSCOPY WITH ANESTHESIA;  Surgeon: Bridger Bell MD;  Location: [facility] ENDOSCOPY;  Service: Gastroenterology;  Laterality: N/A;  pre anemia;gi bleed  post  gi bleed;schatski ring  Dr. ERIC Velasco   • EYE SURGERY Bilateral     catorac   • INCISION AND DRAINAGE PERIRECTAL ABSCESS N/A 3/3/2017    Procedure: INCISION AND DRAINAGE OF JEET ANAL ABSCESS;  Surgeon: Lynette Smith MD;  Location: [facility] OR;  Service:    • MYRINGOTOMY W/ TUBES Left 04/17/2017    06/10/2016   • TONSILLECTOMY     • TOTAL HIP ARTHROPLASTY Right 2006       Review of Systems   Constitutional: Negative for activity change, appetite change, fatigue and unexpected weight change.   Respiratory: Negative for shortness of breath and wheezing.    Cardiovascular: Negative for chest pain, palpitations and leg swelling.   Gastrointestinal: Negative for abdominal pain and blood in stool.   Genitourinary: Negative for difficulty urinating and hematuria.   Musculoskeletal: Positive for arthralgias.        Bilat le claudication       /61   Pulse 51   Ht 182.9 cm (72\")   Wt 77.1 kg (170 lb)   BMI 23.06 kg/m²   Procedures    Objective   Physical " Exam  Constitutional:       Appearance: Normal appearance.   Cardiovascular:      Rate and Rhythm: Bradycardia present.      Pulses: Decreased pulses.      Heart sounds: No murmur heard.  No friction rub. No gallop.    Pulmonary:      Breath sounds: Decreased air movement present. No transmitted upper airway sounds. No rales.   Abdominal:      General: Bowel sounds are normal.      Tenderness: There is no abdominal tenderness.   Musculoskeletal:      Right lower leg: No edema.      Left lower leg: No edema.   Skin:     General: Skin is warm and dry.   Neurological:      General: No focal deficit present.      Mental Status: He is oriented to person, place, and time.   Psychiatric:         Mood and Affect: Mood normal.         Behavior: Behavior normal.         Assessment/Plan   Diagnoses and all orders for this visit:    1. IHD (ischemic heart disease) (Primary)  Comments:  no angina  Orders:  -     ECG 12 Lead    2. Diastolic dysfunction  Comments:  compensated    3. PAD (peripheral artery disease) (HCC)  Comments:  stable claudication                 Return in about 1 year (around 2/28/2023) for Next scheduled follow up.  Orders Placed This Encounter   Procedures   • ECG 12 Lead     Order Specific Question:   Reason for Exam:     Answer:   IHD.HTN     Order Specific Question:   Release to patient     Answer:   Immediate

## 2022-03-01 ENCOUNTER — OFFICE VISIT (OUTPATIENT)
Dept: ENT CLINIC | Age: 72
End: 2022-03-01
Payer: MEDICARE

## 2022-03-01 ENCOUNTER — INFUSION (OUTPATIENT)
Dept: ONCOLOGY | Facility: HOSPITAL | Age: 72
End: 2022-03-01

## 2022-03-01 ENCOUNTER — LAB (OUTPATIENT)
Dept: LAB | Facility: HOSPITAL | Age: 72
End: 2022-03-01

## 2022-03-01 ENCOUNTER — PROCEDURE VISIT (OUTPATIENT)
Dept: ENT CLINIC | Age: 72
End: 2022-03-01
Payer: MEDICARE

## 2022-03-01 VITALS
TEMPERATURE: 98.2 F | BODY MASS INDEX: 23.43 KG/M2 | WEIGHT: 173 LBS | SYSTOLIC BLOOD PRESSURE: 168 MMHG | OXYGEN SATURATION: 98 % | HEART RATE: 57 BPM | HEIGHT: 72 IN | DIASTOLIC BLOOD PRESSURE: 70 MMHG | RESPIRATION RATE: 18 BRPM

## 2022-03-01 VITALS — WEIGHT: 170 LBS | BODY MASS INDEX: 23.03 KG/M2 | HEIGHT: 72 IN

## 2022-03-01 DIAGNOSIS — N18.4 ANEMIA DUE TO STAGE 4 CHRONIC KIDNEY DISEASE: ICD-10-CM

## 2022-03-01 DIAGNOSIS — H90.A32 MIXED CONDUCTIVE AND SENSORINEURAL HEARING LOSS OF LEFT EAR WITH RESTRICTED HEARING OF RIGHT EAR: ICD-10-CM

## 2022-03-01 DIAGNOSIS — D63.1 ANEMIA DUE TO STAGE 4 CHRONIC KIDNEY DISEASE: ICD-10-CM

## 2022-03-01 DIAGNOSIS — H69.81 DYSFUNCTION OF RIGHT EUSTACHIAN TUBE: Primary | ICD-10-CM

## 2022-03-01 DIAGNOSIS — H93.8X1 FULLNESS IN EAR, RIGHT: ICD-10-CM

## 2022-03-01 DIAGNOSIS — E61.1 LOW IRON: ICD-10-CM

## 2022-03-01 DIAGNOSIS — H90.A21 SENSORINEURAL HEARING LOSS (SNHL) OF RIGHT EAR WITH RESTRICTED HEARING OF LEFT EAR: ICD-10-CM

## 2022-03-01 DIAGNOSIS — K29.61 GASTROINTESTINAL HEMORRHAGE ASSOCIATED WITH OTHER GASTRITIS: ICD-10-CM

## 2022-03-01 DIAGNOSIS — Z96.22 STATUS POST MYRINGOTOMY WITH INSERTION OF TUBE: ICD-10-CM

## 2022-03-01 LAB
DEPRECATED RDW RBC AUTO: 57.8 FL (ref 37–54)
ERYTHROCYTE [DISTWIDTH] IN BLOOD BY AUTOMATED COUNT: 16.3 % (ref 12.3–15.4)
FERRITIN SERPL-MCNC: 201.3 NG/ML (ref 30–400)
HCT VFR BLD AUTO: 35.4 % (ref 37.5–51)
HGB BLD-MCNC: 11.1 G/DL (ref 13–17.7)
IRON 24H UR-MRATE: 85 MCG/DL (ref 59–158)
IRON SATN MFR SERPL: 31 % (ref 20–50)
MCH RBC QN AUTO: 30.6 PG (ref 26.6–33)
MCHC RBC AUTO-ENTMCNC: 31.4 G/DL (ref 31.5–35.7)
MCV RBC AUTO: 97.5 FL (ref 79–97)
PLATELET # BLD AUTO: 108 10*3/MM3 (ref 140–450)
PMV BLD AUTO: 9.4 FL (ref 6–12)
RBC # BLD AUTO: 3.63 10*6/MM3 (ref 4.14–5.8)
TIBC SERPL-MCNC: 274 MCG/DL (ref 298–536)
TRANSFERRIN SERPL-MCNC: 184 MG/DL (ref 200–360)
WBC NRBC COR # BLD: 4.64 10*3/MM3 (ref 3.4–10.8)

## 2022-03-01 PROCEDURE — G8484 FLU IMMUNIZE NO ADMIN: HCPCS | Performed by: OTOLARYNGOLOGY

## 2022-03-01 PROCEDURE — 36415 COLL VENOUS BLD VENIPUNCTURE: CPT

## 2022-03-01 PROCEDURE — G0463 HOSPITAL OUTPT CLINIC VISIT: HCPCS

## 2022-03-01 PROCEDURE — 1123F ACP DISCUSS/DSCN MKR DOCD: CPT | Performed by: OTOLARYNGOLOGY

## 2022-03-01 PROCEDURE — G8420 CALC BMI NORM PARAMETERS: HCPCS | Performed by: OTOLARYNGOLOGY

## 2022-03-01 PROCEDURE — 1036F TOBACCO NON-USER: CPT | Performed by: OTOLARYNGOLOGY

## 2022-03-01 PROCEDURE — 92567 TYMPANOMETRY: CPT | Performed by: AUDIOLOGIST

## 2022-03-01 PROCEDURE — G8427 DOCREV CUR MEDS BY ELIG CLIN: HCPCS | Performed by: OTOLARYNGOLOGY

## 2022-03-01 PROCEDURE — 82728 ASSAY OF FERRITIN: CPT

## 2022-03-01 PROCEDURE — 83540 ASSAY OF IRON: CPT

## 2022-03-01 PROCEDURE — 85027 COMPLETE CBC AUTOMATED: CPT

## 2022-03-01 PROCEDURE — 92552 PURE TONE AUDIOMETRY AIR: CPT | Performed by: AUDIOLOGIST

## 2022-03-01 PROCEDURE — 84466 ASSAY OF TRANSFERRIN: CPT

## 2022-03-01 PROCEDURE — 3017F COLORECTAL CA SCREEN DOC REV: CPT | Performed by: OTOLARYNGOLOGY

## 2022-03-01 PROCEDURE — 4040F PNEUMOC VAC/ADMIN/RCVD: CPT | Performed by: OTOLARYNGOLOGY

## 2022-03-01 PROCEDURE — 99212 OFFICE O/P EST SF 10 MIN: CPT | Performed by: OTOLARYNGOLOGY

## 2022-03-01 RX ORDER — COPPER GLUCONATE 2 MG
2 TABLET ORAL DAILY
COMMUNITY
Start: 2021-07-06

## 2022-03-01 RX ORDER — ATORVASTATIN CALCIUM 10 MG/1
TABLET, FILM COATED ORAL
COMMUNITY
Start: 2022-02-07

## 2022-03-01 RX ORDER — HYDRALAZINE HYDROCHLORIDE 25 MG/1
100 TABLET, FILM COATED ORAL 3 TIMES DAILY
COMMUNITY
Start: 2021-10-25

## 2022-03-01 RX ORDER — ALBUTEROL SULFATE 90 UG/1
2 AEROSOL, METERED RESPIRATORY (INHALATION) EVERY 6 HOURS PRN
COMMUNITY

## 2022-03-01 RX ORDER — FUROSEMIDE 20 MG/1
TABLET ORAL
COMMUNITY
Start: 2022-01-29

## 2022-03-01 RX ORDER — CLOPIDOGREL BISULFATE 75 MG/1
TABLET ORAL
COMMUNITY
Start: 2022-01-11

## 2022-03-01 RX ORDER — ALPRAZOLAM 0.25 MG/1
0.25 TABLET ORAL NIGHTLY PRN
COMMUNITY
Start: 2022-02-28

## 2022-03-01 RX ORDER — MESALAMINE 0.38 G/1
CAPSULE, EXTENDED RELEASE ORAL
COMMUNITY
Start: 2022-01-31

## 2022-03-01 RX ORDER — LEVOTHYROXINE SODIUM 75 UG/1
CAPSULE ORAL
COMMUNITY
Start: 2021-03-17

## 2022-03-01 RX ORDER — CHOLESTYRAMINE 4 G/9G
POWDER, FOR SUSPENSION ORAL
COMMUNITY
Start: 2022-02-21

## 2022-03-01 RX ORDER — ERGOCALCIFEROL (VITAMIN D2) 1250 MCG
CAPSULE ORAL
COMMUNITY
Start: 2021-07-01

## 2022-03-01 NOTE — ASSESSMENT & PLAN NOTE
Complains of fullness in right ear which is troublesome. This is mainly because he tends to sleep on his right side and as such is more dependent on his poor hearing (left) ear. No evidence of middle ear fluid  While he may have some element of eustachian tube dysfunction I would certainly recommend any type of procedure to his better hearing ear-especially in light of the fact that he developed a perforation on the left side after tube placement. Hearing levels appear to be stable in the right ear. There is no evidence of conductive deficit. I went over my reasoning with him and he seemed satisfied to simply maintain the status quo.   We will be happy to reevaluate him at any time

## 2022-03-01 NOTE — ASSESSMENT & PLAN NOTE
Left tube extruded  Unfortunately resulted in perforation.   Due to the size of perforation there is some accompanying conductive hearing loss

## 2022-03-01 NOTE — PROGRESS NOTES
74 y.o.  male presents today with complaints of fullness in his right ear. He was concerned that he may have middle ear fluid or some type of blockage and came in for evaluation. After audiometric testing I evaluated him. Family History   Problem Relation Age of Onset    Colon Cancer Neg Hx     Colon Polyps Neg Hx     Esophageal Cancer Neg Hx     Liver Disease Neg Hx     Liver Cancer Neg Hx     Rectal Cancer Neg Hx     Stomach Cancer Neg Hx      Social History     Socioeconomic History    Marital status:      Spouse name: None    Number of children: None    Years of education: None    Highest education level: None   Occupational History    None   Tobacco Use    Smoking status: Former Smoker     Packs/day: 1.00     Years: 12.00     Pack years: 12.00     Quit date: 3/10/2013     Years since quittin.9    Smokeless tobacco: Never Used   Vaping Use    Vaping Use: Never used   Substance and Sexual Activity    Alcohol use: Yes     Alcohol/week: 2.0 standard drinks     Types: 2 Cans of beer per week     Comment: DAILY    Drug use: No    Sexual activity: None   Other Topics Concern    None   Social History Narrative    None     Social Determinants of Health     Financial Resource Strain:     Difficulty of Paying Living Expenses: Not on file   Food Insecurity:     Worried About Running Out of Food in the Last Year: Not on file    John of Food in the Last Year: Not on file   Transportation Needs:     Lack of Transportation (Medical): Not on file    Lack of Transportation (Non-Medical):  Not on file   Physical Activity:     Days of Exercise per Week: Not on file    Minutes of Exercise per Session: Not on file   Stress:     Feeling of Stress : Not on file   Social Connections:     Frequency of Communication with Friends and Family: Not on file    Frequency of Social Gatherings with Friends and Family: Not on file    Attends Rastafari Services: Not on file   [employer] of [employer]s or Organizations: Not on file    Attends Club or Organization Meetings: Not on file    Marital Status: Not on file   Intimate Partner Violence:     Fear of Current or Ex-Partner: Not on file    Emotionally Abused: Not on file    Physically Abused: Not on file    Sexually Abused: Not on file   Housing Stability:     Unable to Pay for Housing in the Last Year: Not on file    Number of [place] in the Last Year: Not on file    Unstable Housing in the Last Year: Not on file     Past Medical History:   Diagnosis Date    Arthritis     Asthma     BPPV (benign paroxysmal positional vertigo)     CAD (coronary artery disease)     Colon polyps     Colon polyps     Dizziness     [facility] of [facility]ism ([facility] [address]     Hyperlipidemia     Hypertension     Low sodium levels     Otitis     Pharyngitis     Proteinuria     Sebaceous cyst     Sinusitis     URI (upper respiratory infection)      Past Surgical History:   Procedure Laterality Date    COLONOSCOPY  ? 2010    ? Doctor or where    COLONOSCOPY N/A 3/10/2016    Dr Shirley Rae AP x 2 (-) dysplasia, BCM x 2, 5 yr recall    CORONARY ARTERY BYPASS GRAFT      CYST REMOVAL      SEBACEOUS    JOINT REPLACEMENT      RTH    TONSILLECTOMY AND ADENOIDECTOMY      TYMPANOSTOMY TUBE PLACEMENT Left          REVIEW OF SYSTEMS:  all other systems reviewed and are negative  General Health: no change in health status since last visit  Ears: ear pain No Bilateral recent drainage No  Bilateral ear popping/ fullness Yes  Right episodic  Hearing: no change Bilateral       Comments:     PHYSICAL EXAM:    Ht 6' (1.829 m)   Wt 170 lb (77.1 kg)   BMI 23.06 kg/m²   Body mass index is 23.06 kg/m².     General Appearance: well developed , well nourished and no distress  Head/ Face: normocephalic and atraumatic  Vocal Quality: good/ normal  Ears: Right Ear: External: external ears normal Otoscopy Ear Canal: canal clear Otoscopy TM: TM's normal, TM's mobile and TM's intact Left Ear: External: external ears normal Otoscopy Ear Canal: canal clear Otoscopy TM: perforation: central and Inferior central perforation involving approximately one third of TM. Hearing: Rinne A>B: Left, Rinne A>B: Right, Vivar L, Air R>L and see audiogram  Neuro: alert and oriented x3 and cranial nerves II- XII grossly intact  Psych/ Mood: cooperative and no depression, anxiety or agitation    Assessment & Plan:    Problem List Items Addressed This Visit        ENT Problems    Fullness in ear, right     Complains of fullness in right ear which is troublesome. This is mainly because he tends to sleep on his right side and as such is more dependent on his poor hearing (left) ear. No evidence of middle ear fluid  While he may have some element of eustachian tube dysfunction I would certainly recommend any type of procedure to his better hearing ear-especially in light of the fact that he developed a perforation on the left side after tube placement. Hearing levels appear to be stable in the right ear. There is no evidence of conductive deficit. I went over my reasoning with him and he seemed satisfied to simply maintain the status quo. We will be happy to reevaluate him at any time            Other    Status post myringotomy with insertion of tube     Left tube extruded  Unfortunately resulted in perforation. Due to the size of perforation there is some accompanying conductive hearing loss               No orders of the defined types were placed in this encounter. No orders of the defined types were placed in this encounter. Please note that this chart was generated using dragon dictation software. Although every effort was made to ensure the accuracy of this automated transcription, some errors in transcription may have occurred.

## 2022-03-01 NOTE — PROGRESS NOTES
History   Anna Smyth is a 74 y.o. male who presented to the clinic this date with complaints of right aural fullness. He has long standing history of bilateral ear complaints. He has history of left TM perforation. Summary   Tympanometry consistent with negative middle ear pressure right. The left ear was not tested due to known TM perforation. Pure tone testing indicates mild to moderately severe SNHL right and moderate to profound mixed hearing loss left. When compared to audiogram obtained in November 2020, threshold remained stable bilaterally. Results   Otoscopy:    Right: Clear EAC/Normal TM   Left: TM perforation    Audiometry:    Right: Mild to moderately severe sloping SNHL (previously established)   Left: Moderate to profound sloping mixed hearing loss (previously established)         Tympanometry:     Right: Type C   Left: Not tested      Plan   Results of today's testing were discussed with Mr. Champ Chan and the following recommendations were made:    1. Follow up with ENT as scheduled. 2. Hearing aid evaluation as desired. 3. Monitor hearing yearly, sooner with changes. 4. Hearing protection as warranted.         Audiogram and Acoustic Immittance

## 2022-03-09 ENCOUNTER — OFFICE VISIT (OUTPATIENT)
Dept: PULMONOLOGY | Facility: CLINIC | Age: 72
End: 2022-03-09

## 2022-03-09 VITALS
OXYGEN SATURATION: 99 % | SYSTOLIC BLOOD PRESSURE: 136 MMHG | WEIGHT: 169 LBS | HEART RATE: 54 BPM | HEIGHT: 72 IN | DIASTOLIC BLOOD PRESSURE: 82 MMHG | BODY MASS INDEX: 22.89 KG/M2

## 2022-03-09 DIAGNOSIS — I27.20 PULMONARY HYPERTENSION: Primary | ICD-10-CM

## 2022-03-09 DIAGNOSIS — I51.89 DIASTOLIC DYSFUNCTION: ICD-10-CM

## 2022-03-09 DIAGNOSIS — J44.9 STAGE 2 MODERATE COPD BY GOLD CLASSIFICATION: ICD-10-CM

## 2022-03-09 PROCEDURE — 99213 OFFICE O/P EST LOW 20 MIN: CPT | Performed by: INTERNAL MEDICINE

## 2022-03-09 NOTE — PROGRESS NOTES
"Background:  pt w crohn's disease, pulm htn   Chief Complaint  pulmonary hypertension    Subjective    History of Present Illness       Ricardo Hugo presents to [facility].  He rarely needs an inhaler.   He had a favorable visit with Dr. Farley recently without indications for decompensated cardiac disease.  No chest pain no leg swelling no increased dyspnea.  He is able to do physical work without difficulty.  He has no complaints today.     Objective     Vital Signs:   /82   Pulse 54   Ht 182.9 cm (72\")   Wt 76.7 kg (169 lb)   SpO2 99% Comment: RA  BMI 22.92 kg/m²   Physical Exam  Constitutional:       Appearance: Normal appearance. He is not ill-appearing or diaphoretic.   Eyes:      Extraocular Movements: Extraocular movements intact.   Cardiovascular:      Rate and Rhythm: Regular rhythm. Bradycardia present.   Pulmonary:      Effort: Pulmonary effort is normal. No respiratory distress.      Breath sounds: No wheezing, rhonchi or rales.   Abdominal:      General: Abdomen is flat. There is no distension.   Skin:     Findings: No erythema or rash.   Neurological:      Mental Status: He is alert and oriented to person, place, and time.        Result Review  Data Reviewed:{ Labs  Result Review  Imaging  Media :23}       PFT Values        Some values may be hidden. Unless noted otherwise, only the newest values recorded on each date are displayed.         Old Values PFT Results 6/8/21   No data to display.      Pre Drug PFT Results 6/8/21   FVC 71   FEV1 60   FEF 25-75% 32   FEV1/FVC 65.82      Post Drug PFT Results 6/8/21   No data to display.      Other Tests PFT Results 6/8/21         DLCO 36   D/VAsb 44                      Assessment and Plan  {CC Problem List  Visit Diagnosis  ROS  Review (Popup)  Health Maintenance  Quality  BestPractice  Medications  SmartSets  SnapShot Encounters  Media :23}   Diagnoses and all " orders for this visit:    1. Pulmonary hypertension (HCC) (Primary)    2. Diastolic dysfunction    3. Stage 2 moderate COPD by GOLD classification (HCC)    Patient has a reported history of pulmonary hypertension.  This is in the setting of some underlying heart and lung disease so it is probably groups 2 and 3.  No indication for decompensation of that now.  Favorable recent cardiac evaluation would indicate diastolic dysfunction is compensated.  He does have moderate COPD.  He is minimally symptomatic.  We have tried maintenance inhalers in the past but has not really been interested in those due to lack of significant symptoms.  Continue albuterol as needed.  Will recheck spirometry later in the year.    Follow Up {Instructions Charge Capture  Follow-up Communications :23}   Return in about 6 months (around 9/9/2022) for Spirometry and DLCO.  Patient was given instructions and counseling regarding his condition or for health maintenance advice. Please see specific information pulled into the AVS if appropriate.    Electronically signed by New Omalley MD, 3/9/2022, 10:50 CST

## 2022-03-10 RX ORDER — FUROSEMIDE 20 MG/1
20 TABLET ORAL DAILY
Qty: 180 TABLET | Refills: 1 | Status: SHIPPED | OUTPATIENT
Start: 2022-03-10 | End: 2022-07-01

## 2022-03-15 ENCOUNTER — LAB (OUTPATIENT)
Dept: LAB | Facility: HOSPITAL | Age: 72
End: 2022-03-15

## 2022-03-15 ENCOUNTER — INFUSION (OUTPATIENT)
Dept: ONCOLOGY | Facility: HOSPITAL | Age: 72
End: 2022-03-15

## 2022-03-15 VITALS
OXYGEN SATURATION: 99 % | HEIGHT: 72 IN | TEMPERATURE: 97.9 F | DIASTOLIC BLOOD PRESSURE: 53 MMHG | BODY MASS INDEX: 22.75 KG/M2 | WEIGHT: 168 LBS | SYSTOLIC BLOOD PRESSURE: 171 MMHG | HEART RATE: 51 BPM | RESPIRATION RATE: 16 BRPM

## 2022-03-15 DIAGNOSIS — N18.4 ANEMIA DUE TO STAGE 4 CHRONIC KIDNEY DISEASE: ICD-10-CM

## 2022-03-15 DIAGNOSIS — E61.1 LOW IRON: ICD-10-CM

## 2022-03-15 DIAGNOSIS — D63.1 ANEMIA DUE TO STAGE 4 CHRONIC KIDNEY DISEASE: ICD-10-CM

## 2022-03-15 DIAGNOSIS — K29.61 GASTROINTESTINAL HEMORRHAGE ASSOCIATED WITH OTHER GASTRITIS: ICD-10-CM

## 2022-03-15 LAB
DEPRECATED RDW RBC AUTO: 48.8 FL (ref 37–54)
ERYTHROCYTE [DISTWIDTH] IN BLOOD BY AUTOMATED COUNT: 14.3 % (ref 12.3–15.4)
FERRITIN SERPL-MCNC: 295.3 NG/ML (ref 30–400)
HCT VFR BLD AUTO: 33 % (ref 37.5–51)
HGB BLD-MCNC: 10.7 G/DL (ref 13–17.7)
IRON 24H UR-MRATE: 88 MCG/DL (ref 59–158)
IRON SATN MFR SERPL: 34 % (ref 20–50)
MCH RBC QN AUTO: 30.5 PG (ref 26.6–33)
MCHC RBC AUTO-ENTMCNC: 32.4 G/DL (ref 31.5–35.7)
MCV RBC AUTO: 94 FL (ref 79–97)
PLATELET # BLD AUTO: 117 10*3/MM3 (ref 140–450)
PMV BLD AUTO: 10.1 FL (ref 6–12)
RBC # BLD AUTO: 3.51 10*6/MM3 (ref 4.14–5.8)
TIBC SERPL-MCNC: 258 MCG/DL (ref 298–536)
TRANSFERRIN SERPL-MCNC: 173 MG/DL (ref 200–360)
WBC NRBC COR # BLD: 5.64 10*3/MM3 (ref 3.4–10.8)

## 2022-03-15 PROCEDURE — 85027 COMPLETE CBC AUTOMATED: CPT

## 2022-03-15 PROCEDURE — 82728 ASSAY OF FERRITIN: CPT

## 2022-03-15 PROCEDURE — 84466 ASSAY OF TRANSFERRIN: CPT

## 2022-03-15 PROCEDURE — G0463 HOSPITAL OUTPT CLINIC VISIT: HCPCS

## 2022-03-15 PROCEDURE — 83540 ASSAY OF IRON: CPT

## 2022-03-15 PROCEDURE — 36415 COLL VENOUS BLD VENIPUNCTURE: CPT

## 2022-04-01 ENCOUNTER — APPOINTMENT (OUTPATIENT)
Dept: ONCOLOGY | Facility: HOSPITAL | Age: 72
End: 2022-04-01

## 2022-04-01 ENCOUNTER — OFFICE VISIT (OUTPATIENT)
Dept: ONCOLOGY | Facility: CLINIC | Age: 72
End: 2022-04-01

## 2022-04-01 ENCOUNTER — LAB (OUTPATIENT)
Dept: LAB | Facility: HOSPITAL | Age: 72
End: 2022-04-01

## 2022-04-01 VITALS
BODY MASS INDEX: 23.09 KG/M2 | HEIGHT: 72 IN | WEIGHT: 170.5 LBS | SYSTOLIC BLOOD PRESSURE: 144 MMHG | OXYGEN SATURATION: 99 % | RESPIRATION RATE: 16 BRPM | HEART RATE: 61 BPM | DIASTOLIC BLOOD PRESSURE: 54 MMHG | TEMPERATURE: 97.9 F

## 2022-04-01 DIAGNOSIS — N18.4 CKD (CHRONIC KIDNEY DISEASE) STAGE 4, GFR 15-29 ML/MIN: Primary | ICD-10-CM

## 2022-04-01 DIAGNOSIS — E61.1 LOW IRON: ICD-10-CM

## 2022-04-01 DIAGNOSIS — N18.4 CKD (CHRONIC KIDNEY DISEASE) STAGE 4, GFR 15-29 ML/MIN: ICD-10-CM

## 2022-04-01 DIAGNOSIS — D50.0 ANEMIA DUE TO GI BLOOD LOSS: ICD-10-CM

## 2022-04-01 DIAGNOSIS — D63.1 ANEMIA DUE TO STAGE 4 CHRONIC KIDNEY DISEASE: Primary | ICD-10-CM

## 2022-04-01 DIAGNOSIS — N18.4 ANEMIA DUE TO STAGE 4 CHRONIC KIDNEY DISEASE: Primary | ICD-10-CM

## 2022-04-01 LAB
ALBUMIN SERPL-MCNC: 4 G/DL (ref 3.5–5.2)
ALBUMIN/GLOB SERPL: 1.5 G/DL
ALP SERPL-CCNC: 238 U/L (ref 39–117)
ALT SERPL W P-5'-P-CCNC: 21 U/L (ref 1–41)
ANION GAP SERPL CALCULATED.3IONS-SCNC: 13 MMOL/L (ref 5–15)
AST SERPL-CCNC: 18 U/L (ref 1–40)
BASOPHILS # BLD MANUAL: 0.22 10*3/MM3 (ref 0–0.2)
BASOPHILS NFR BLD MANUAL: 4 % (ref 0–1.5)
BILIRUB SERPL-MCNC: 0.2 MG/DL (ref 0–1.2)
BUN SERPL-MCNC: 42 MG/DL (ref 8–23)
BUN/CREAT SERPL: 15.7 (ref 7–25)
CALCIUM SPEC-SCNC: 9.1 MG/DL (ref 8.6–10.5)
CHLORIDE SERPL-SCNC: 107 MMOL/L (ref 98–107)
CO2 SERPL-SCNC: 22 MMOL/L (ref 22–29)
CREAT SERPL-MCNC: 2.68 MG/DL (ref 0.76–1.27)
DEPRECATED RDW RBC AUTO: 47.7 FL (ref 37–54)
EGFRCR SERPLBLD CKD-EPI 2021: 24.2 ML/MIN/1.73
EOSINOPHIL # BLD MANUAL: 0.16 10*3/MM3 (ref 0–0.4)
EOSINOPHIL NFR BLD MANUAL: 3 % (ref 0.3–6.2)
ERYTHROCYTE [DISTWIDTH] IN BLOOD BY AUTOMATED COUNT: 13.6 % (ref 12.3–15.4)
FERRITIN SERPL-MCNC: 320.9 NG/ML (ref 30–400)
GLOBULIN UR ELPH-MCNC: 2.6 GM/DL
GLUCOSE SERPL-MCNC: 100 MG/DL (ref 65–99)
HCT VFR BLD AUTO: 31.3 % (ref 37.5–51)
HGB BLD-MCNC: 10.1 G/DL (ref 13–17.7)
HOLD SPECIMEN: NORMAL
IRON 24H UR-MRATE: 80 MCG/DL (ref 59–158)
IRON SATN MFR SERPL: 31 % (ref 20–50)
LYMPHOCYTES # BLD MANUAL: 0.66 10*3/MM3 (ref 0.7–3.1)
LYMPHOCYTES NFR BLD MANUAL: 11.1 % (ref 5–12)
MCH RBC QN AUTO: 30.9 PG (ref 26.6–33)
MCHC RBC AUTO-ENTMCNC: 32.3 G/DL (ref 31.5–35.7)
MCV RBC AUTO: 95.7 FL (ref 79–97)
MONOCYTES # BLD: 0.6 10*3/MM3 (ref 0.1–0.9)
NEUTROPHILS # BLD AUTO: 3.76 10*3/MM3 (ref 1.7–7)
NEUTROPHILS NFR BLD MANUAL: 69.7 % (ref 42.7–76)
PLATELET # BLD AUTO: 116 10*3/MM3 (ref 140–450)
PMV BLD AUTO: 9.8 FL (ref 6–12)
POTASSIUM SERPL-SCNC: 4.9 MMOL/L (ref 3.5–5.2)
PROT SERPL-MCNC: 6.6 G/DL (ref 6–8.5)
RBC # BLD AUTO: 3.27 10*6/MM3 (ref 4.14–5.8)
RBC MORPH BLD: NORMAL
SMALL PLATELETS BLD QL SMEAR: ABNORMAL
SODIUM SERPL-SCNC: 142 MMOL/L (ref 136–145)
TIBC SERPL-MCNC: 262 MCG/DL (ref 298–536)
TRANSFERRIN SERPL-MCNC: 176 MG/DL (ref 200–360)
VARIANT LYMPHS NFR BLD MANUAL: 5.1 % (ref 0–5)
VARIANT LYMPHS NFR BLD MANUAL: 7.1 % (ref 19.6–45.3)
WBC MORPH BLD: NORMAL
WBC NRBC COR # BLD: 5.4 10*3/MM3 (ref 3.4–10.8)

## 2022-04-01 PROCEDURE — 84466 ASSAY OF TRANSFERRIN: CPT

## 2022-04-01 PROCEDURE — 85007 BL SMEAR W/DIFF WBC COUNT: CPT

## 2022-04-01 PROCEDURE — 83540 ASSAY OF IRON: CPT

## 2022-04-01 PROCEDURE — 82728 ASSAY OF FERRITIN: CPT

## 2022-04-01 PROCEDURE — 99213 OFFICE O/P EST LOW 20 MIN: CPT | Performed by: INTERNAL MEDICINE

## 2022-04-01 PROCEDURE — 36415 COLL VENOUS BLD VENIPUNCTURE: CPT

## 2022-04-01 PROCEDURE — 85025 COMPLETE CBC W/AUTO DIFF WBC: CPT

## 2022-04-01 PROCEDURE — 80053 COMPREHEN METABOLIC PANEL: CPT

## 2022-04-18 RX ORDER — HYDRALAZINE HYDROCHLORIDE 25 MG/1
25 TABLET, FILM COATED ORAL 3 TIMES DAILY
Qty: 90 TABLET | Refills: 5 | Status: SHIPPED | OUTPATIENT
Start: 2022-04-18 | End: 2023-03-27

## 2022-04-21 ENCOUNTER — OFFICE VISIT (OUTPATIENT)
Dept: INTERNAL MEDICINE | Facility: CLINIC | Age: 72
End: 2022-04-21

## 2022-04-21 VITALS
TEMPERATURE: 97.3 F | BODY MASS INDEX: 22.89 KG/M2 | OXYGEN SATURATION: 99 % | DIASTOLIC BLOOD PRESSURE: 62 MMHG | HEIGHT: 72 IN | SYSTOLIC BLOOD PRESSURE: 142 MMHG | WEIGHT: 169 LBS | HEART RATE: 62 BPM

## 2022-04-21 DIAGNOSIS — H65.491 OTHER CHRONIC NONSUPPURATIVE OTITIS MEDIA OF RIGHT EAR: ICD-10-CM

## 2022-04-21 DIAGNOSIS — H90.6 MIXED CONDUCTIVE AND SENSORINEURAL HEARING LOSS OF BOTH EARS: Primary | ICD-10-CM

## 2022-04-21 DIAGNOSIS — H72.92 PERFORATION OF LEFT TYMPANIC MEMBRANE: ICD-10-CM

## 2022-04-21 PROCEDURE — 99213 OFFICE O/P EST LOW 20 MIN: CPT | Performed by: NURSE PRACTITIONER

## 2022-04-21 RX ORDER — PREDNISONE 10 MG/1
TABLET ORAL
Qty: 48 EACH | Refills: 0 | Status: SHIPPED | OUTPATIENT
Start: 2022-04-21 | End: 2022-05-19

## 2022-04-21 RX ORDER — CEFDINIR 300 MG/1
300 CAPSULE ORAL 2 TIMES DAILY
Qty: 28 CAPSULE | Refills: 0 | Status: SHIPPED | OUTPATIENT
Start: 2022-04-21 | End: 2022-05-05

## 2022-04-21 NOTE — PROGRESS NOTES
"Chief Complaint  Ear Problem (Right, states he did take cefdinir and did seem to help.)    Subjective          Ricardo Hugo presents to [facility]  Patient c/o ear fullness.       Objective   Vital Signs:   /62 (BP Location: Left arm, Patient Position: Sitting, Cuff Size: Adult)   Pulse 62   Temp 97.3 °F (36.3 °C)   Ht 182.9 cm (72\")   Wt 76.7 kg (169 lb)   SpO2 99%   BMI 22.92 kg/m²     BMI is within normal parameters. No follow-up required.      Physical Exam  Vitals and nursing note reviewed.   Constitutional:       Appearance: Normal appearance.   HENT:      Head: Normocephalic and atraumatic.      Right Ear: Tympanic membrane is erythematous and bulging.      Left Ear: Tympanic membrane is perforated.      Nose: Congestion present.      Mouth/Throat:      Pharynx: Posterior oropharyngeal erythema present.   Cardiovascular:      Rate and Rhythm: Normal rate and regular rhythm.      Pulses: Normal pulses.      Heart sounds: Normal heart sounds.   Pulmonary:      Effort: Pulmonary effort is normal.   Musculoskeletal:         General: Normal range of motion.      Cervical back: Normal range of motion and neck supple.   Skin:     General: Skin is warm and dry.   Neurological:      General: No focal deficit present.      Mental Status: He is alert and oriented to person, place, and time.   Psychiatric:         Mood and Affect: Mood normal.         Behavior: Behavior normal.        Result Review :                 Assessment and Plan    Diagnoses and all orders for this visit:    1. Mixed conductive and sensorineural hearing loss of both ears (Primary)    2. Perforation of left tympanic membrane    3. Other chronic nonsuppurative otitis media of right ear  -     predniSONE (DELTASONE) 10 MG (48) dose pack; As directed  Dispense: 48 each; Refill: 0  -     cefdinir (OMNICEF) 300 MG capsule; Take 1 capsule by mouth 2 (Two) Times a Day for 14 days.  Dispense: 28 capsule; " "Refill: 0    Patient has a hx of ear problems and sinus problems have had adult PE tubes. Left TM perf noted to left ear. C/o decreased hearing. The patient is wanting another script for cefdinir and steroids today. He has the bottle with him with exactly what he wants. \"The routine 7 days does not work, I need 14 days.\"      Follow Up   Return if symptoms worsen or fail to improve.  Patient was given instructions and counseling regarding his condition or for health maintenance advice. Please see specific information pulled into the AVS if appropriate.       "

## 2022-04-29 ENCOUNTER — LAB (OUTPATIENT)
Dept: LAB | Facility: HOSPITAL | Age: 72
End: 2022-04-29

## 2022-04-29 DIAGNOSIS — D50.0 ANEMIA DUE TO GI BLOOD LOSS: ICD-10-CM

## 2022-04-29 DIAGNOSIS — N18.4 CKD (CHRONIC KIDNEY DISEASE) STAGE 4, GFR 15-29 ML/MIN: ICD-10-CM

## 2022-04-29 LAB
ALBUMIN SERPL-MCNC: 3.7 G/DL (ref 3.5–5.2)
ALBUMIN/GLOB SERPL: 1.3 G/DL
ALP SERPL-CCNC: 153 U/L (ref 39–117)
ALT SERPL W P-5'-P-CCNC: 35 U/L (ref 1–41)
ANION GAP SERPL CALCULATED.3IONS-SCNC: 12 MMOL/L (ref 5–15)
AST SERPL-CCNC: 17 U/L (ref 1–40)
BILIRUB SERPL-MCNC: 0.3 MG/DL (ref 0–1.2)
BUN SERPL-MCNC: 68 MG/DL (ref 8–23)
BUN/CREAT SERPL: 28.8 (ref 7–25)
CALCIUM SPEC-SCNC: 8.1 MG/DL (ref 8.6–10.5)
CHLORIDE SERPL-SCNC: 107 MMOL/L (ref 98–107)
CO2 SERPL-SCNC: 19 MMOL/L (ref 22–29)
CREAT SERPL-MCNC: 2.36 MG/DL (ref 0.76–1.27)
DEPRECATED RDW RBC AUTO: 50.1 FL (ref 37–54)
EGFRCR SERPLBLD CKD-EPI 2021: 28.2 ML/MIN/1.73
ERYTHROCYTE [DISTWIDTH] IN BLOOD BY AUTOMATED COUNT: 14.4 % (ref 12.3–15.4)
FERRITIN SERPL-MCNC: 298.8 NG/ML (ref 30–400)
GLOBULIN UR ELPH-MCNC: 2.9 GM/DL
GLUCOSE SERPL-MCNC: 107 MG/DL (ref 65–99)
HCT VFR BLD AUTO: 35 % (ref 37.5–51)
HGB BLD-MCNC: 11.4 G/DL (ref 13–17.7)
IRON 24H UR-MRATE: 128 MCG/DL (ref 59–158)
IRON SATN MFR SERPL: 53 % (ref 20–50)
MCH RBC QN AUTO: 31.1 PG (ref 26.6–33)
MCHC RBC AUTO-ENTMCNC: 32.6 G/DL (ref 31.5–35.7)
MCV RBC AUTO: 95.4 FL (ref 79–97)
PLATELET # BLD AUTO: 137 10*3/MM3 (ref 140–450)
PMV BLD AUTO: 10.3 FL (ref 6–12)
POTASSIUM SERPL-SCNC: 4.9 MMOL/L (ref 3.5–5.2)
PROT SERPL-MCNC: 6.6 G/DL (ref 6–8.5)
RBC # BLD AUTO: 3.67 10*6/MM3 (ref 4.14–5.8)
SODIUM SERPL-SCNC: 138 MMOL/L (ref 136–145)
TIBC SERPL-MCNC: 241 MCG/DL (ref 298–536)
TRANSFERRIN SERPL-MCNC: 162 MG/DL (ref 200–360)
WBC NRBC COR # BLD: 14.98 10*3/MM3 (ref 3.4–10.8)

## 2022-04-29 PROCEDURE — 85027 COMPLETE CBC AUTOMATED: CPT

## 2022-04-29 PROCEDURE — 36415 COLL VENOUS BLD VENIPUNCTURE: CPT

## 2022-04-29 PROCEDURE — 82728 ASSAY OF FERRITIN: CPT

## 2022-04-29 PROCEDURE — 80053 COMPREHEN METABOLIC PANEL: CPT

## 2022-04-29 PROCEDURE — 84466 ASSAY OF TRANSFERRIN: CPT

## 2022-04-29 PROCEDURE — 83540 ASSAY OF IRON: CPT

## 2022-04-29 RX ORDER — LEVOTHYROXINE SODIUM 0.07 MG/1
75 TABLET ORAL DAILY
Qty: 90 TABLET | Refills: 3 | Status: SHIPPED | OUTPATIENT
Start: 2022-04-29 | End: 2022-09-12 | Stop reason: SDUPTHER

## 2022-05-05 DIAGNOSIS — I73.9 PAD (PERIPHERAL ARTERY DISEASE): ICD-10-CM

## 2022-05-06 RX ORDER — ATORVASTATIN CALCIUM 10 MG/1
10 TABLET, FILM COATED ORAL DAILY
Qty: 90 TABLET | Refills: 3 | Status: SHIPPED | OUTPATIENT
Start: 2022-05-06 | End: 2022-09-23 | Stop reason: SDUPTHER

## 2022-05-09 RX ORDER — METOPROLOL SUCCINATE 25 MG/1
TABLET, EXTENDED RELEASE ORAL
Qty: 90 TABLET | Refills: 3 | Status: SHIPPED | OUTPATIENT
Start: 2022-05-09 | End: 2023-02-28 | Stop reason: ALTCHOICE

## 2022-05-19 ENCOUNTER — HOSPITAL ENCOUNTER (OUTPATIENT)
Dept: ULTRASOUND IMAGING | Facility: HOSPITAL | Age: 72
Discharge: HOME | End: 2022-05-19

## 2022-05-19 ENCOUNTER — OFFICE VISIT (OUTPATIENT)
Dept: VASCULAR SURGERY | Facility: CLINIC | Age: 72
End: 2022-05-19

## 2022-05-19 VITALS
SYSTOLIC BLOOD PRESSURE: 124 MMHG | OXYGEN SATURATION: 97 % | WEIGHT: 169 LBS | DIASTOLIC BLOOD PRESSURE: 76 MMHG | HEIGHT: 72 IN | BODY MASS INDEX: 22.89 KG/M2 | HEART RATE: 57 BPM

## 2022-05-19 DIAGNOSIS — I73.9 PAD (PERIPHERAL ARTERY DISEASE): ICD-10-CM

## 2022-05-19 DIAGNOSIS — I65.23 BILATERAL CAROTID ARTERY STENOSIS: ICD-10-CM

## 2022-05-19 DIAGNOSIS — I65.23 BILATERAL CAROTID ARTERY STENOSIS: Primary | ICD-10-CM

## 2022-05-19 DIAGNOSIS — I10 ESSENTIAL HYPERTENSION: ICD-10-CM

## 2022-05-19 PROCEDURE — 93923 UPR/LXTR ART STDY 3+ LVLS: CPT | Performed by: SURGERY

## 2022-05-19 PROCEDURE — 99214 OFFICE O/P EST MOD 30 MIN: CPT | Performed by: SURGERY

## 2022-05-19 PROCEDURE — 93880 EXTRACRANIAL BILAT STUDY: CPT | Performed by: SURGERY

## 2022-05-19 PROCEDURE — 93923 UPR/LXTR ART STDY 3+ LVLS: CPT

## 2022-05-19 PROCEDURE — 93880 EXTRACRANIAL BILAT STUDY: CPT

## 2022-05-19 NOTE — PROGRESS NOTES
"5/19/2022       Trisha Soni DO  [address] [facility] [address]    Ricardo Hugo  1950    Chief Complaint   Patient presents with   • Follow-up     6 Month Follow Up For Peripheral Artery Disease and Bilateral Carotid Artery Stenosis. Test [id number] US pad ankle / brach ind ext comp and US pad carotid bilateral. Patient denies any stroke like symptoms.    • Former Smoker     Patient is a Former Smoker - Quit 2013         Dear Trisha Soni DO       HPI  I had the pleasure of seeing your patient Ricardo Hugo in the office today.  As you recall, Ricardo Hugo is a 72 y.o.  male who we are following for lower extremity PAD and carotid occlusive disease.  He was previously having some claudication to his lower extremities however reports this has improved since beginning Plavix.  He was found to have significant carotid disease and underwent a right carotid endarterectomy on 5/10/2021.  Currently he is doing well and denies any strokelike symptoms.  He is maintained on aspirin, Plavix, and Lipitor.  He did have noninvasive testing performed today, which I did review in office.       Review of Systems   Constitutional: Negative.    HENT: Negative.    Eyes: Negative.    Respiratory: Negative.    Cardiovascular: Negative.    Gastrointestinal: Negative.    Endocrine: Negative.    Genitourinary: Negative.    Musculoskeletal: Negative.    Skin: Negative.    Allergic/Immunologic: Negative.    Neurological: Negative.    Hematological: Negative.    Psychiatric/Behavioral: Negative.    All other systems reviewed and are negative.        /76 (BP Location: Right arm, Patient Position: Sitting, Cuff Size: Adult)   Pulse 57   Ht 182.9 cm (72\")   Wt 76.7 kg (169 lb)   SpO2 97%   BMI 22.92 kg/m²    Physical Exam  Vitals and nursing note reviewed.   Constitutional:       Appearance: Normal appearance. He is well-developed.   HENT:      Head: Normocephalic and atraumatic.   Eyes:      " General: No scleral icterus.     Pupils: Pupils are equal, round, and reactive to light.   Neck:      Thyroid: No thyromegaly.      Vascular: No carotid bruit or JVD.   Cardiovascular:      Rate and Rhythm: Normal rate and regular rhythm.      Pulses:           Carotid pulses are 2+ on the right side and 2+ on the left side.       Femoral pulses are 2+ on the right side and 2+ on the left side.       Dorsalis pedis pulses are detected w/ Doppler on the right side and detected w/ Doppler on the left side.        Posterior tibial pulses are detected w/ Doppler on the right side and detected w/ Doppler on the left side.      Heart sounds: Normal heart sounds.   Pulmonary:      Effort: Pulmonary effort is normal.      Breath sounds: Normal breath sounds.   Abdominal:      General: Bowel sounds are normal. There is no distension or abdominal bruit.      Palpations: Abdomen is soft. There is no mass.      Tenderness: There is no abdominal tenderness.   Musculoskeletal:         General: Normal range of motion.      Cervical back: Neck supple.   Lymphadenopathy:      Cervical: No cervical adenopathy.   Skin:     General: Skin is warm and dry.   Neurological:      General: No focal deficit present.      Mental Status: He is alert and oriented to person, place, and time.      Cranial Nerves: No cranial nerve deficit.      Sensory: No sensory deficit.   Psychiatric:         Mood and Affect: Mood normal.         Behavior: Behavior normal.         Thought Content: Thought content normal.         Judgment: Judgment normal.          Diagnostic data:  US Carotid Bilateral    Result Date: 5/19/2022  Narrative: History: Carotid occlusive disease      Impression: Impression: 1. There is 50-69% stenosis of the right internal carotid artery. 2. There is 50-69% stenosis of the left internal carotid artery. 3. Antegrade flow is demonstrated in bilateral vertebral arteries.  Comments: Bilateral carotid vertebral arterial duplex scan was  performed.  Grayscale imaging shows intimal thickening and calcified elements at the carotid bifurcation. The right internal carotid artery peak systolic velocity is 129.3 cm/sec. The end-diastolic velocity is 26.1 cm/sec. The right ICA/CCA ratio is approximately 1.5 . These findings correlate with 50-69% stenosis of the right internal carotid artery.  Grayscale imaging shows intimal thickening and calcified elements at the carotid bifurcation. The left internal carotid artery peak systolic velocity is 166.5 cm/sec. The end-diastolic velocity is 28.4 cm/sec. The left ICA/CCA ratio is approximately 1.3 . These findings correlate with 50-69% stenosis of the left internal carotid artery.  Antegrade flow is demonstrated in bilateral vertebral arteries. There is greater than 50% stenosis of the left common carotid artery and bilateral external carotid arteries. This report was finalized on 05/19/2022 15:22 by Dr. Gil Pineda MD.    US Ankle / Brachial Indices Extremity Complete    Result Date: 5/19/2022  Narrative:  History: PAD  Comments: Bilateral lower extremity arterial with multi-level pulse volume recordings and segmental pressures were performed at rest and stress.  The right ankle/brachial index is 0.61. The waveforms are biphasic with mild dampening.These findings are consistent with moderate arterial insufficiency of the right lower extremity at rest.  The left ankle/brachial index is 0.59. The waveforms are biphasic with mild dampening. These findings are consistent with moderate arterial insufficiency of the left lower extremity at rest.      Impression: Impression: 1. Moderate arterial insufficiency of the right lower extremity at rest. 2. Moderate arterial insufficiency of the left lower extremity at rest.   This report was finalized on 05/19/2022 15:16 by Dr. Gil Pineda MD.       Patient Active Problem List   Diagnosis   • Chronic rhinitis   • Acute renal failure superimposed on stage 4 chronic  kidney disease (HCC)   • Hyponatremia   • Acute cystitis   • Metabolic acidosis, increased anion gap   • Hypomagnesemia   • Hypokalemia   • Moderate malnutrition (HCC)   • Hypertension, benign   • Sepsis (HCC)   • Enterocolitis   • Chronic diarrhea   • UTI (urinary tract infection), bacterial   • Crohn's disease of large intestine with other complication (HCC)   • Asymmetrical hearing loss of left ear   • Symptomatic anemia   • CKD (chronic kidney disease) stage 4, GFR 15-29 ml/min (HCC)   • Bruit of right carotid artery   • Wellness examination   • Pre-op evaluation   • PAD (peripheral artery disease) (HCC)   • IHD (ischemic heart disease)   • Carotid stenosis   • Stenosis of right carotid artery   • Preop testing   • Carotid stenosis, right   • Avascular necrosis of bone of hip (HCC)   • Diastolic dysfunction   • Shortness of breath   • CRD (chronic renal disease), stage IV (HCC)   • Thrombocytopenia (HCC)   • History of alcoholism (HCC)   • Anemia due to chronic kidney disease   • Copper deficiency   • Low iron    • CLL (chronic lymphocytic leukemia) (HCC)   • Pulmonary hypertension (HCC)   • GI bleed   • Hypomagnesemia   • Hypokalemia         ICD-10-CM ICD-9-CM   1. Bilateral carotid artery stenosis  I65.23 433.10     433.30   2. PAD (peripheral artery disease) (HCC)  I73.9 443.9   3. Essential hypertension  I10 401.9       Plan: After thoroughly evaluating Ricardo Hugo, I believe the best course of action is to remain conservative from vascular surgery standpoint.  Currently he is doing well and denies any strokelike symptoms.  He also says his legs are doing pretty good and he can walk a pretty good distance before he starts to have any discomfort.  I did review his testing which shows 50 to 69% stenosis bilaterally.  His ABIs show moderate arterial insufficiency to his lower extremities which is unchanged as well.  I will see him back in 6 months with repeat noninvasive testing for continued surveillance,  including a carotid duplex and ABIs.  Again if his claudication changes he could undergo angiogram but would need hydration prior to the procedure given his kidney function.  I did discuss vascular risk factors as a pertains to the progression of vascular disease including controlling his hypertension.  This risk factors currently stable.   The patient can continue taking their current medication regimen as previously planned.  This was all discussed in full with complete understanding.    Thank you for allowing me to participate in the care of your patient.  Please do not hesitate with any questions or concerns.  I will keep you aware of any further encounters with Ricardo Hugo.        Sincerely yours,         Gil Pineda, DO

## 2022-05-23 RX ORDER — CHOLESTYRAMINE 4 G/9G
POWDER, FOR SUSPENSION ORAL
Qty: 90 PACKET | Refills: 1 | Status: SHIPPED | OUTPATIENT
Start: 2022-05-23 | End: 2022-09-13 | Stop reason: SDUPTHER

## 2022-05-27 ENCOUNTER — LAB (OUTPATIENT)
Dept: LAB | Facility: HOSPITAL | Age: 72
End: 2022-05-27

## 2022-05-27 DIAGNOSIS — N18.4 CKD (CHRONIC KIDNEY DISEASE) STAGE 4, GFR 15-29 ML/MIN: ICD-10-CM

## 2022-05-27 DIAGNOSIS — D50.0 ANEMIA DUE TO GI BLOOD LOSS: ICD-10-CM

## 2022-05-27 LAB
ALBUMIN SERPL-MCNC: 4.3 G/DL (ref 3.5–5.2)
ALBUMIN/GLOB SERPL: 1.4 G/DL
ALP SERPL-CCNC: 168 U/L (ref 39–117)
ALT SERPL W P-5'-P-CCNC: 18 U/L (ref 1–41)
ANION GAP SERPL CALCULATED.3IONS-SCNC: 15 MMOL/L (ref 5–15)
AST SERPL-CCNC: 18 U/L (ref 1–40)
BILIRUB SERPL-MCNC: 0.2 MG/DL (ref 0–1.2)
BUN SERPL-MCNC: 43 MG/DL (ref 8–23)
BUN/CREAT SERPL: 17.1 (ref 7–25)
CALCIUM SPEC-SCNC: 8.9 MG/DL (ref 8.6–10.5)
CHLORIDE SERPL-SCNC: 105 MMOL/L (ref 98–107)
CO2 SERPL-SCNC: 21 MMOL/L (ref 22–29)
CREAT SERPL-MCNC: 2.52 MG/DL (ref 0.76–1.27)
DEPRECATED RDW RBC AUTO: 50.1 FL (ref 37–54)
EGFRCR SERPLBLD CKD-EPI 2021: 26.1 ML/MIN/1.73
ERYTHROCYTE [DISTWIDTH] IN BLOOD BY AUTOMATED COUNT: 13.9 % (ref 12.3–15.4)
FERRITIN SERPL-MCNC: 281.3 NG/ML (ref 30–400)
GLOBULIN UR ELPH-MCNC: 3.1 GM/DL
GLUCOSE SERPL-MCNC: 114 MG/DL (ref 65–99)
HCT VFR BLD AUTO: 31 % (ref 37.5–51)
HGB BLD-MCNC: 9.8 G/DL (ref 13–17.7)
IRON 24H UR-MRATE: 94 MCG/DL (ref 59–158)
IRON SATN MFR SERPL: 32 % (ref 20–50)
MCH RBC QN AUTO: 31.2 PG (ref 26.6–33)
MCHC RBC AUTO-ENTMCNC: 31.6 G/DL (ref 31.5–35.7)
MCV RBC AUTO: 98.7 FL (ref 79–97)
PLATELET # BLD AUTO: 129 10*3/MM3 (ref 140–450)
PMV BLD AUTO: 10.1 FL (ref 6–12)
POTASSIUM SERPL-SCNC: 4.4 MMOL/L (ref 3.5–5.2)
PROT SERPL-MCNC: 7.4 G/DL (ref 6–8.5)
RBC # BLD AUTO: 3.14 10*6/MM3 (ref 4.14–5.8)
SODIUM SERPL-SCNC: 141 MMOL/L (ref 136–145)
TIBC SERPL-MCNC: 298 MCG/DL (ref 298–536)
TRANSFERRIN SERPL-MCNC: 200 MG/DL (ref 200–360)
WBC NRBC COR # BLD: 4.73 10*3/MM3 (ref 3.4–10.8)

## 2022-05-27 PROCEDURE — 80053 COMPREHEN METABOLIC PANEL: CPT

## 2022-05-27 PROCEDURE — 84466 ASSAY OF TRANSFERRIN: CPT

## 2022-05-27 PROCEDURE — 85027 COMPLETE CBC AUTOMATED: CPT

## 2022-05-27 PROCEDURE — 82728 ASSAY OF FERRITIN: CPT

## 2022-05-27 PROCEDURE — 83540 ASSAY OF IRON: CPT

## 2022-05-27 PROCEDURE — 36415 COLL VENOUS BLD VENIPUNCTURE: CPT

## 2022-05-31 ENCOUNTER — TELEPHONE (OUTPATIENT)
Dept: ONCOLOGY | Facility: CLINIC | Age: 72
End: 2022-05-31

## 2022-05-31 NOTE — TELEPHONE ENCOUNTER
Caller: Ricardo Hugo    Relationship: Self    Best call back number: [phone]    What is the best time to reach you: ANYTIME    Who are you requesting to speak with (clinical staff, provider,  specific staff member): VELIA KATZ       What was the call regarding:     WOULD LIKE TO LOOK AT LAST BLOOD TEST COMPREHENSIVE METABOLIC PANEL 05/27      BEFORE DR GOLDBERG , WAS GETTING SHOT,     BELIEVES IS DUE FOR  A SHOT, AND LAST TIME DID NOT GET ONE.           Do you require a callback: YES TO FOLLOW UP       \

## 2022-06-17 ENCOUNTER — OFFICE VISIT (OUTPATIENT)
Dept: INTERNAL MEDICINE | Facility: CLINIC | Age: 72
End: 2022-06-17

## 2022-06-17 VITALS
WEIGHT: 168.2 LBS | DIASTOLIC BLOOD PRESSURE: 70 MMHG | TEMPERATURE: 97.1 F | HEART RATE: 53 BPM | HEIGHT: 72 IN | OXYGEN SATURATION: 98 % | BODY MASS INDEX: 22.78 KG/M2 | SYSTOLIC BLOOD PRESSURE: 160 MMHG

## 2022-06-17 DIAGNOSIS — J01.11 ACUTE RECURRENT FRONTAL SINUSITIS: Primary | ICD-10-CM

## 2022-06-17 PROCEDURE — 99213 OFFICE O/P EST LOW 20 MIN: CPT

## 2022-06-17 RX ORDER — METHYLPREDNISOLONE 4 MG/1
TABLET ORAL
Qty: 1 TABLET | Refills: 1 | Status: SHIPPED | OUTPATIENT
Start: 2022-06-17 | End: 2022-07-01

## 2022-06-17 NOTE — PROGRESS NOTES
Subjective   Ricardo Hugo is a 72 y.o. male.   Chief Complaint   Patient presents with   • Allergies     Bilateral ear fullness usually feels this in the mornings, runny nose, has taken cefdinir 300 mg along w/steroids in the past and this works really well for him       History of Present Illness   Mr. Hugo here today with complaints of bilateral ear fullness in the mornings, runny nose.  When asked about duration he said always however more severe right now, always has allergies.  He was treated for otitis media back in mid to end April.  He received prednisone and Omnicef at that time.  He states that that made him feel better and he is pretty much felt better until now.  He denies any shortness of breath, chest pain, headaches, sore throat, fever, adenopathy, or malaise.  He takes Allegra, Benadryl at night, and Flonase.  He states his blood pressure is elevated currently because he had not had a chance to take his afternoon dose of his hydralazine.    The following portions of the patient's history were reviewed and updated as appropriate: allergies, current medications, past family history, past medical history, past social history, past surgical history and problem list.    Review of Systems    Objective   Past Medical History:   Diagnosis Date   • 3-vessel CAD 8/11/2020   • Allergic rhinitis    • Anxiety disorder 4/27/2020   • Arthritis    • Asymmetrical sensorineural hearing loss 6/28/2017   • Atherosclerosis of native artery of both lower extremities with intermittent claudication ([facility]) 7/18/2019   • Avascular necrosis of femoral head, left ([facility]) 07/11/2020    right hip after surgery   • Carotid stenosis    • Chronic mucoid otitis media    • Chronic rhinitis    • Coronary artery disease     HEART BYPASS 2004   • Crohn's disease of large intestine with other complication ([facility]) 7/30/2020    Chronic diarrhea Colonoscopy July 2020 revealed mild patchy scattered hemosiderin staining with inflammation  more so in rectosigmoid area.  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   • Iron deficiency anemia 7/14/2020   • Mixed hearing loss of left ear    • PAD (peripheral artery disease) ([facility]) 8/11/2020   • Perianal abscess    • Pernicious anemia 08/17/2020    took shots but never diagnosed with b12 deficiency   • Personal history of alcoholism ([facility]) 08/11/2020    quit drinking in 2013   • Prostatic hypertrophy 8/11/2020   • Sensorineural hearing loss    • Sepsis with acute renal failure ([facility]) 9/15/2020   • Shortness of breath 5/27/2021   • Tinnitus    • Ventricular tachycardia, nonsustained ([facility]) 7/14/2020   • Weight loss 7/11/2020      Past Surgical History:   Procedure Laterality Date   • ARTERY SURGERY  2021    right side on neck   • CAROTID ENDARTERECTOMY Right 5/10/2021    Procedure: RIGHT CAROTID ENDARTERECTOMY WITH EEG;  Surgeon: Gil Pineda DO;  Location: [facility] OR ;  Service: Vascular;  Laterality: Right;   • COLONOSCOPY N/A 7/2/2020    Procedure: COLONOSCOPY WITH ANESTHESIA;  Surgeon: Adrien Brewster MD;  Location: [facility] ENDOSCOPY;  Service: Gastroenterology;  Laterality: N/A;  pre op: diarrhea  post op: polyps  PCP: Joe Velasco MD   • COLONOSCOPY N/A 10/13/2020    Procedure: COLONOSCOPY WITH ANESTHESIA;  Surgeon: Adrien Brewster MD;  Location: [facility] ENDOSCOPY;  Service: Gastroenterology;  Laterality: N/A;  Pre: Chronic Diarrhea, Crohn's  Post: AVM  Dr. Neftali Velasco  CO2 Inflation Used   • CORONARY ARTERY BYPASS GRAFT  2003    x3   • ENDOSCOPY N/A 11/2/2021    Procedure: ESOPHAGOGASTRODUODENOSCOPY WITH ANESTHESIA;  Surgeon: Bridger Bell  MD JAMA;  Location: [facility] ENDOSCOPY;  Service: Gastroenterology;  Laterality: N/A;  pre anemia;gi bleed  post  gi bleed;schatski ring  Dr. ERIC Velasco   • EYE SURGERY Bilateral     catorac   • INCISION AND DRAINAGE PERIRECTAL ABSCESS N/A 3/3/2017    Procedure: INCISION AND DRAINAGE OF JEET ANAL ABSCESS;  Surgeon: Lynette Smith MD;  Location: [facility] OR;  Service:    • MYRINGOTOMY W/ TUBES Left 04/17/2017    06/10/2016   • TONSILLECTOMY     • TOTAL HIP ARTHROPLASTY Right 2006        Current Outpatient Medications:   •  albuterol sulfate  (90 Base) MCG/ACT inhaler, USE 2 INHALATIONS FOUR TIMES A DAY AS NEEDED, Disp: 20.1 g, Rfl: 3  •  ALPRAZolam (Xanax) 0.25 MG tablet, Take 1 tablet by mouth At Night As Needed for Anxiety., Disp: 30 tablet, Rfl: 3  •  amLODIPine (NORVASC) 10 MG tablet, TAKE 1 TABLET DAILY, Disp: 90 tablet, Rfl: 3  •  aspirin (aspirin) 81 MG EC tablet, Take  by mouth Daily., Disp: , Rfl:   •  atorvastatin (LIPITOR) 10 MG tablet, TAKE 1 TABLET BY MOUTH DAILY, Disp: 90 tablet, Rfl: 3  •  azelastine (ASTELIN) 0.1 % nasal spray, USE 1 SPRAY IN EACH NOSTRIL TWICE A DAY AS NEEDED, Disp: 90 mL, Rfl: 1  •  cholestyramine (QUESTRAN) 4 g packet, MIX AND DRINK 1 PACKET DAILY, Disp: 90 packet, Rfl: 1  •  cloNIDine (CATAPRES) 0.2 MG tablet, TAKE 3 TABLETS DAILY, Disp: 270 tablet, Rfl: 3  •  clopidogrel (PLAVIX) 75 MG tablet, Take 75 mg by mouth Daily., Disp: , Rfl:   •  Copper Gluconate 2 MG tablet, Take 2 mg by mouth Daily., Disp: 30 tablet, Rfl: 6  •  diphenhydrAMINE (BENADRYL) 25 mg capsule, Take 25 mg by mouth Every 6 (Six) Hours As Needed for Itching., Disp: , Rfl:   •  fexofenadine (ALLEGRA) 180 MG tablet, Take 180 mg by mouth Daily., Disp: , Rfl:   •  fluticasone (FLONASE) 50 MCG/ACT nasal spray, 2 sprays into the nostril(s) as directed by provider Daily As Needed for Rhinitis., Disp: , Rfl:   •  furosemide (Lasix) 20 MG tablet, Take 1 tablet by mouth Daily. 2 TABLETS PER DAY, Disp: 180 tablet, Rfl:  "1  •  hydrALAZINE (APRESOLINE) 25 MG tablet, Take 1 tablet by mouth 3 (Three) Times a Day., Disp: 90 tablet, Rfl: 5  •  levothyroxine (Synthroid) 75 MCG tablet, Take 1 tablet by mouth Daily., Disp: 90 tablet, Rfl: 3  •  magnesium chloride ER 64 MG DR tablet, Take 143 mg by mouth Daily., Disp: , Rfl:   •  Melatonin 10 MG capsule, Take 2 capsules by mouth Every Night., Disp: , Rfl:   •  mesalamine (APRISO) 0.375 g 24 hr capsule, TAKE 4 CAPSULES DAILY, Disp: 360 capsule, Rfl: 3  •  metoprolol succinate XL (TOPROL-XL) 25 MG 24 hr tablet, TAKE 1 TABLET DAILY, Disp: 90 tablet, Rfl: 3  •  Multiple Vitamins-Minerals (PRESERVISION/LUTEIN) capsule, Take 1 capsule by mouth 2 (two) times a day., Disp: , Rfl:   •  omeprazole (priLOSEC) 20 MG capsule, TAKE 1 CAPSULE DAILY, Disp: 90 capsule, Rfl: 1  •  potassium chloride 10 MEQ CR tablet, TAKE 1 TABLET TWICE A DAY, Disp: 180 tablet, Rfl: 3  •  sodium bicarbonate 650 MG tablet, Take 0.5 tablets by mouth 2 (Two) Times a Day. (Patient taking differently: Take 325 mg by mouth 3 (Three) Times a Day.), Disp: 180 tablet, Rfl: 3  •  valsartan (DIOVAN) 160 MG tablet, Take 160 mg by mouth Daily., Disp: , Rfl:   •  vitamin D (ERGOCALCIFEROL) 1.25 MG (93494 UT) capsule capsule, Take 1 capsule by mouth 1 (One) Time Per Week., Disp: 15 capsule, Rfl: 3  •  methylPREDNISolone (MEDROL) 4 MG dose pack, Take as directed on package instructions., Disp: 1 tablet, Rfl: 1      /70 (BP Location: Left arm, Patient Position: Sitting, Cuff Size: Adult)   Pulse 53   Temp 97.1 °F (36.2 °C) (Temporal)   Ht 182.9 cm (72\")   Wt 76.3 kg (168 lb 3.2 oz)   SpO2 98%   BMI 22.81 kg/m²      Body mass index is 22.81 kg/m².  BMI is within normal parameters. No other follow-up for BMI required.       Physical Exam  Vitals and nursing note reviewed.   Constitutional:       General: He is not in acute distress.     Appearance: Normal appearance. He is normal weight. He is not ill-appearing, toxic-appearing or " diaphoretic.   HENT:      Head: Normocephalic and atraumatic.      Right Ear: Ear canal and external ear normal. There is no impacted cerumen. Tympanic membrane is bulging.      Left Ear: Ear canal and external ear normal. There is no impacted cerumen. Tympanic membrane is bulging.      Nose: Rhinorrhea present. No congestion.      Mouth/Throat:      Mouth: Mucous membranes are moist.      Pharynx: Oropharynx is clear. No oropharyngeal exudate or posterior oropharyngeal erythema.   Eyes:      Extraocular Movements: Extraocular movements intact.      Conjunctiva/sclera: Conjunctivae normal.      Pupils: Pupils are equal, round, and reactive to light.   Cardiovascular:      Rate and Rhythm: Normal rate and regular rhythm.      Pulses: Normal pulses.      Heart sounds: Murmur heard.   Pulmonary:      Effort: Pulmonary effort is normal.      Breath sounds: Wheezing (Posterior lobes) present. No rales.   Abdominal:      General: Abdomen is flat. Bowel sounds are normal.      Palpations: Abdomen is soft.   Musculoskeletal:         General: Normal range of motion.      Cervical back: Normal range of motion and neck supple. No rigidity or tenderness.   Lymphadenopathy:      Cervical: No cervical adenopathy.   Skin:     General: Skin is warm and dry.      Capillary Refill: Capillary refill takes less than 2 seconds.   Neurological:      General: No focal deficit present.      Mental Status: He is alert and oriented to person, place, and time. Mental status is at baseline.      Motor: No weakness.      Coordination: Coordination normal.      Gait: Gait normal.   Psychiatric:         Mood and Affect: Mood normal.         Behavior: Behavior normal.         Thought Content: Thought content normal.         Judgment: Judgment normal.               Assessment & Plan   Diagnoses and all orders for this visit:    1. Acute recurrent frontal sinusitis (Primary)  -     methylPREDNISolone (MEDROL) 4 MG dose pack; Take as directed on  package instructions.  Dispense: 1 tablet; Refill: 1               Plan of care reviewed with Mr. Hugo.  I have advised that I do not suspect that he has a current infection as he feels fine has had no fevers, has no cervical adenopathy, no cough, tympanic membranes are bulging but clear.  I have advised that we go ahead and start with a steroid pack and he can continue his Allegra Flonase and Benadryl at night and he also has Mucinex at home so I advised that he takes this for a week to drink plenty of water, noted wheezes in bilateral lobes posteriorly.  He has not needed to use his albuterol inhaler has no shortness of breath.  I have advised that if his symptoms do not improve by Monday to please reach out and then I will consider prescribing an antibiotic if necessary.  It has been less than 90 days since he has received the Omnicef.  He is in agreement with this.  I have also advised that he please reach out if his blood pressure continues to run greater than 140/90.  Follow-up with Dr. Rodriguez in August, can be seen prior to this as needed.

## 2022-06-24 DIAGNOSIS — N18.4 ANEMIA DUE TO STAGE 4 CHRONIC KIDNEY DISEASE: ICD-10-CM

## 2022-06-24 DIAGNOSIS — D50.0 ANEMIA DUE TO GI BLOOD LOSS: Primary | ICD-10-CM

## 2022-06-24 DIAGNOSIS — D63.1 ANEMIA DUE TO STAGE 4 CHRONIC KIDNEY DISEASE: ICD-10-CM

## 2022-06-26 DIAGNOSIS — F41.9 ANXIETY: ICD-10-CM

## 2022-06-28 RX ORDER — ALPRAZOLAM 0.25 MG/1
0.25 TABLET ORAL NIGHTLY PRN
Qty: 30 TABLET | Refills: 2 | Status: SHIPPED | OUTPATIENT
Start: 2022-06-28 | End: 2022-09-26

## 2022-06-28 NOTE — PROGRESS NOTES
History   Ana Stout is a 65 y.o. male who presented to the clinic this date for a hearing evaluation prior to left PE tube placement. Summary   Tympanometry consistent with borderline negative pressure right and hyper-compliant TM with borderline negative pressure left. Pure tone testing indicates mild to moderate SNHL right and moderate to profound mixed hearing loss left. Due to left asymmetry, speech testing will be performed at post op tube audio visit. Results   Otoscopy:    Right: Unremarkable   Left: Unremarkable    Audiometry:    Right: Mild to moderate sloping SNHL   Left: Moderate to profound sloping SNHL         Tympanometry:     Right: Type A   Left: Type Ad    Plan   Results of today's testing were discussed with Mr. Evelio Burns and the following recommendations were made:    1. Follow up with ENT as scheduled. 2. Recheck hearing following tube placement.         Audiogram and Acoustic Immittance wound(s)

## 2022-07-01 ENCOUNTER — LAB (OUTPATIENT)
Dept: LAB | Facility: HOSPITAL | Age: 72
End: 2022-07-01

## 2022-07-01 ENCOUNTER — OFFICE VISIT (OUTPATIENT)
Dept: ONCOLOGY | Facility: CLINIC | Age: 72
End: 2022-07-01

## 2022-07-01 VITALS
HEIGHT: 72 IN | TEMPERATURE: 97.9 F | BODY MASS INDEX: 23.16 KG/M2 | SYSTOLIC BLOOD PRESSURE: 170 MMHG | HEART RATE: 64 BPM | OXYGEN SATURATION: 98 % | RESPIRATION RATE: 16 BRPM | DIASTOLIC BLOOD PRESSURE: 68 MMHG | WEIGHT: 171 LBS

## 2022-07-01 DIAGNOSIS — D63.1 ANEMIA DUE TO STAGE 4 CHRONIC KIDNEY DISEASE: ICD-10-CM

## 2022-07-01 DIAGNOSIS — N18.4 CKD (CHRONIC KIDNEY DISEASE) STAGE 4, GFR 15-29 ML/MIN: Primary | ICD-10-CM

## 2022-07-01 DIAGNOSIS — N18.4 ANEMIA DUE TO STAGE 4 CHRONIC KIDNEY DISEASE: ICD-10-CM

## 2022-07-01 DIAGNOSIS — C91.10 CLL (CHRONIC LYMPHOCYTIC LEUKEMIA): Primary | ICD-10-CM

## 2022-07-01 DIAGNOSIS — K70.30 ALCOHOLIC CIRRHOSIS, UNSPECIFIED WHETHER ASCITES PRESENT: ICD-10-CM

## 2022-07-01 LAB
ALBUMIN SERPL-MCNC: 4.1 G/DL (ref 3.5–5.2)
ALBUMIN/GLOB SERPL: 1.4 G/DL
ALP SERPL-CCNC: 156 U/L (ref 39–117)
ALT SERPL W P-5'-P-CCNC: 17 U/L (ref 1–41)
ANION GAP SERPL CALCULATED.3IONS-SCNC: 12 MMOL/L (ref 5–15)
AST SERPL-CCNC: 14 U/L (ref 1–40)
BASOPHILS # BLD AUTO: 0.05 10*3/MM3 (ref 0–0.2)
BASOPHILS NFR BLD AUTO: 0.8 % (ref 0–1.5)
BILIRUB SERPL-MCNC: 0.3 MG/DL (ref 0–1.2)
BUN SERPL-MCNC: 47 MG/DL (ref 8–23)
BUN/CREAT SERPL: 18.3 (ref 7–25)
CALCIUM SPEC-SCNC: 8.6 MG/DL (ref 8.6–10.5)
CHLORIDE SERPL-SCNC: 107 MMOL/L (ref 98–107)
CO2 SERPL-SCNC: 20 MMOL/L (ref 22–29)
CREAT SERPL-MCNC: 2.57 MG/DL (ref 0.76–1.27)
DEPRECATED RDW RBC AUTO: 48.6 FL (ref 37–54)
EGFRCR SERPLBLD CKD-EPI 2021: 25.4 ML/MIN/1.73
EOSINOPHIL # BLD AUTO: 0.18 10*3/MM3 (ref 0–0.4)
EOSINOPHIL NFR BLD AUTO: 2.8 % (ref 0.3–6.2)
ERYTHROCYTE [DISTWIDTH] IN BLOOD BY AUTOMATED COUNT: 13.6 % (ref 12.3–15.4)
FERRITIN SERPL-MCNC: 254.2 NG/ML (ref 30–400)
GLOBULIN UR ELPH-MCNC: 2.9 GM/DL
GLUCOSE SERPL-MCNC: 121 MG/DL (ref 65–99)
HCT VFR BLD AUTO: 30.5 % (ref 37.5–51)
HGB BLD-MCNC: 9.7 G/DL (ref 13–17.7)
HOLD SPECIMEN: NORMAL
IMM GRANULOCYTES # BLD AUTO: 0.03 10*3/MM3 (ref 0–0.05)
IMM GRANULOCYTES NFR BLD AUTO: 0.5 % (ref 0–0.5)
IRON 24H UR-MRATE: 94 MCG/DL (ref 59–158)
IRON SATN MFR SERPL: 33 % (ref 20–50)
LYMPHOCYTES # BLD AUTO: 1.51 10*3/MM3 (ref 0.7–3.1)
LYMPHOCYTES NFR BLD AUTO: 23.1 % (ref 19.6–45.3)
MCH RBC QN AUTO: 30.9 PG (ref 26.6–33)
MCHC RBC AUTO-ENTMCNC: 31.8 G/DL (ref 31.5–35.7)
MCV RBC AUTO: 97.1 FL (ref 79–97)
MONOCYTES # BLD AUTO: 0.63 10*3/MM3 (ref 0.1–0.9)
MONOCYTES NFR BLD AUTO: 9.6 % (ref 5–12)
NEUTROPHILS NFR BLD AUTO: 4.14 10*3/MM3 (ref 1.7–7)
NEUTROPHILS NFR BLD AUTO: 63.2 % (ref 42.7–76)
NRBC BLD AUTO-RTO: 0 /100 WBC (ref 0–0.2)
PLATELET # BLD AUTO: 112 10*3/MM3 (ref 140–450)
PMV BLD AUTO: 10.4 FL (ref 6–12)
POTASSIUM SERPL-SCNC: 4.4 MMOL/L (ref 3.5–5.2)
PROT SERPL-MCNC: 7 G/DL (ref 6–8.5)
RBC # BLD AUTO: 3.14 10*6/MM3 (ref 4.14–5.8)
SODIUM SERPL-SCNC: 139 MMOL/L (ref 136–145)
TIBC SERPL-MCNC: 288 MCG/DL (ref 298–536)
TRANSFERRIN SERPL-MCNC: 193 MG/DL (ref 200–360)
WBC NRBC COR # BLD: 6.54 10*3/MM3 (ref 3.4–10.8)

## 2022-07-01 PROCEDURE — 82728 ASSAY OF FERRITIN: CPT

## 2022-07-01 PROCEDURE — 85025 COMPLETE CBC W/AUTO DIFF WBC: CPT

## 2022-07-01 PROCEDURE — 36415 COLL VENOUS BLD VENIPUNCTURE: CPT

## 2022-07-01 PROCEDURE — 80053 COMPREHEN METABOLIC PANEL: CPT

## 2022-07-01 PROCEDURE — 83540 ASSAY OF IRON: CPT

## 2022-07-01 PROCEDURE — 99214 OFFICE O/P EST MOD 30 MIN: CPT | Performed by: NURSE PRACTITIONER

## 2022-07-01 PROCEDURE — 84466 ASSAY OF TRANSFERRIN: CPT

## 2022-07-08 ENCOUNTER — LAB (OUTPATIENT)
Dept: LAB | Facility: HOSPITAL | Age: 72
End: 2022-07-08

## 2022-07-08 DIAGNOSIS — D63.1 ANEMIA DUE TO STAGE 4 CHRONIC KIDNEY DISEASE: ICD-10-CM

## 2022-07-08 DIAGNOSIS — N18.4 ANEMIA DUE TO STAGE 4 CHRONIC KIDNEY DISEASE: ICD-10-CM

## 2022-07-08 LAB
DEPRECATED RDW RBC AUTO: 49.3 FL (ref 37–54)
ERYTHROCYTE [DISTWIDTH] IN BLOOD BY AUTOMATED COUNT: 13.7 % (ref 12.3–15.4)
HCT VFR BLD AUTO: 33.1 % (ref 37.5–51)
HGB BLD-MCNC: 10.7 G/DL (ref 13–17.7)
MCH RBC QN AUTO: 31.6 PG (ref 26.6–33)
MCHC RBC AUTO-ENTMCNC: 32.3 G/DL (ref 31.5–35.7)
MCV RBC AUTO: 97.6 FL (ref 79–97)
PLATELET # BLD AUTO: 120 10*3/MM3 (ref 140–450)
PMV BLD AUTO: 9.9 FL (ref 6–12)
RBC # BLD AUTO: 3.39 10*6/MM3 (ref 4.14–5.8)
WBC NRBC COR # BLD: 5.92 10*3/MM3 (ref 3.4–10.8)

## 2022-07-08 PROCEDURE — 85027 COMPLETE CBC AUTOMATED: CPT

## 2022-07-08 PROCEDURE — 36415 COLL VENOUS BLD VENIPUNCTURE: CPT

## 2022-07-12 RX ORDER — CLOPIDOGREL BISULFATE 75 MG/1
TABLET ORAL
Qty: 90 TABLET | Refills: 3 | Status: SHIPPED | OUTPATIENT
Start: 2022-07-12

## 2022-07-15 ENCOUNTER — LAB (OUTPATIENT)
Dept: LAB | Facility: HOSPITAL | Age: 72
End: 2022-07-15

## 2022-07-15 DIAGNOSIS — N18.4 ANEMIA DUE TO STAGE 4 CHRONIC KIDNEY DISEASE: ICD-10-CM

## 2022-07-15 DIAGNOSIS — D63.1 ANEMIA DUE TO STAGE 4 CHRONIC KIDNEY DISEASE: ICD-10-CM

## 2022-07-15 LAB
DEPRECATED RDW RBC AUTO: 47.8 FL (ref 37–54)
ERYTHROCYTE [DISTWIDTH] IN BLOOD BY AUTOMATED COUNT: 13.2 % (ref 12.3–15.4)
HCT VFR BLD AUTO: 33.2 % (ref 37.5–51)
HGB BLD-MCNC: 10.3 G/DL (ref 13–17.7)
MCH RBC QN AUTO: 30.7 PG (ref 26.6–33)
MCHC RBC AUTO-ENTMCNC: 31 G/DL (ref 31.5–35.7)
MCV RBC AUTO: 98.8 FL (ref 79–97)
PLATELET # BLD AUTO: 125 10*3/MM3 (ref 140–450)
PMV BLD AUTO: 9.6 FL (ref 6–12)
RBC # BLD AUTO: 3.36 10*6/MM3 (ref 4.14–5.8)
WBC NRBC COR # BLD: 5.4 10*3/MM3 (ref 3.4–10.8)

## 2022-07-15 PROCEDURE — 85027 COMPLETE CBC AUTOMATED: CPT

## 2022-07-15 PROCEDURE — 36415 COLL VENOUS BLD VENIPUNCTURE: CPT

## 2022-07-19 ENCOUNTER — OFFICE VISIT (OUTPATIENT)
Dept: INTERNAL MEDICINE | Facility: CLINIC | Age: 72
End: 2022-07-19

## 2022-07-19 VITALS
DIASTOLIC BLOOD PRESSURE: 66 MMHG | SYSTOLIC BLOOD PRESSURE: 142 MMHG | TEMPERATURE: 97.1 F | HEIGHT: 72 IN | HEART RATE: 65 BPM | OXYGEN SATURATION: 97 % | BODY MASS INDEX: 22.75 KG/M2 | WEIGHT: 168 LBS

## 2022-07-19 DIAGNOSIS — J30.2 SEASONAL ALLERGIC RHINITIS, UNSPECIFIED TRIGGER: Primary | ICD-10-CM

## 2022-07-19 DIAGNOSIS — H65.91 FLUID LEVEL BEHIND TYMPANIC MEMBRANE OF RIGHT EAR: ICD-10-CM

## 2022-07-19 PROCEDURE — 99213 OFFICE O/P EST LOW 20 MIN: CPT | Performed by: NURSE PRACTITIONER

## 2022-07-19 RX ORDER — METHYLPREDNISOLONE 4 MG/1
TABLET ORAL
Qty: 21 TABLET | Refills: 0 | Status: SHIPPED | OUTPATIENT
Start: 2022-07-19 | End: 2022-08-02

## 2022-07-19 NOTE — PROGRESS NOTES
Subjective     Chief Complaint:  Allergies    HPI:  Patient presents to the office today with complaints of allergies.  He states his allergies bother him all the time, but are usually worse in more humid weather.  His main complaint is that his ears feel full and stuffed up.  He has also had a runny nose.  He denies any cough or congestion.  He denies shortness of breath or chest pain.  He has had no fever or chills.  He does take Allegra daily and uses Flonase and Astelin as needed.    Patient's PMR from outside medical facility reviewed and noted.    Past Medical History:   Past Medical History:   Diagnosis Date   • 3-vessel CAD 8/11/2020   • Allergic rhinitis    • Anxiety disorder 4/27/2020   • Arthritis    • Asymmetrical sensorineural hearing loss 6/28/2017   • Atherosclerosis of native artery of both lower extremities with intermittent claudication ([facility]) 7/18/2019   • Avascular necrosis of femoral head, left ([facility]) 07/11/2020    right hip after surgery   • Carotid stenosis    • Chronic mucoid otitis media    • Chronic rhinitis    • Coronary artery disease     HEART BYPASS 2004   • Crohn's disease of large intestine with other complication ([facility]) 7/30/2020    Chronic diarrhea Colonoscopy July 2020 revealed mild patchy scattered hemosiderin staining with inflammation more so in rectosigmoid area.  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   • Iron deficiency anemia 7/14/2020   • Mixed hearing loss of left ear    • PAD (peripheral artery disease) ([facility]) 8/11/2020   • Perianal abscess    • Pernicious anemia 08/17/2020    took shots but never diagnosed with b12  deficiency   • Personal history of alcoholism ([facility]) 08/11/2020    quit drinking in 2013   • Prostatic hypertrophy 8/11/2020   • Sensorineural hearing loss    • Sepsis with acute renal failure ([facility]) 9/15/2020   • Shortness of breath 5/27/2021   • Tinnitus    • Ventricular tachycardia, nonsustained ([facility]) 7/14/2020   • Weight loss 7/11/2020     Past Surgical History:  Past Surgical History:   Procedure Laterality Date   • ARTERY SURGERY  2021    right side on neck   • CAROTID ENDARTERECTOMY Right 5/10/2021    Procedure: RIGHT CAROTID ENDARTERECTOMY WITH EEG;  Surgeon: Gil Pineda DO;  Location: [facility] HYBRID OR 12;  Service: Vascular;  Laterality: Right;   • COLONOSCOPY N/A 7/2/2020    Procedure: COLONOSCOPY WITH ANESTHESIA;  Surgeon: Adrien Brewster MD;  Location: [facility] ENDOSCOPY;  Service: Gastroenterology;  Laterality: N/A;  pre op: diarrhea  post op: polyps  PCP: Joe Velasco MD   • COLONOSCOPY N/A 10/13/2020    Procedure: COLONOSCOPY WITH ANESTHESIA;  Surgeon: Adrien Brewster MD;  Location: [facility] ENDOSCOPY;  Service: Gastroenterology;  Laterality: N/A;  Pre: Chronic Diarrhea, Crohn's  Post: AVM  Dr. Neftali Velasco  CO2 Inflation Used   • CORONARY ARTERY BYPASS GRAFT  2003    x3   • ENDOSCOPY N/A 11/2/2021    Procedure: ESOPHAGOGASTRODUODENOSCOPY WITH ANESTHESIA;  Surgeon: Bridger Bell MD;  Location: [facility] ENDOSCOPY;  Service: Gastroenterology;  Laterality: N/A;  pre anemia;gi bleed  post  gi bleed;schatski ring  Dr. ERIC Velasco   • EYE SURGERY Bilateral     catorac   • INCISION AND DRAINAGE PERIRECTAL ABSCESS N/A 3/3/2017    Procedure: INCISION AND DRAINAGE OF JEET ANAL ABSCESS;  Surgeon: Lynette Smith MD;  Location: [facility] OR;  Service:    • MYRINGOTOMY W/ TUBES Left 04/17/2017    06/10/2016   • TONSILLECTOMY     • TOTAL HIP ARTHROPLASTY Right 2006     Social History:  reports that he quit smoking about 8 years ago. His smoking use included cigarettes. He started smoking about 34 years  ago. He has a 12.50 pack-year smoking history. He has never used smokeless tobacco. He reports previous alcohol use. He reports that he does not use drugs.    Family History: family history includes No Known Problems in his daughter, father, and mother.      Allergies:  Allergies   Allergen Reactions   • Ondansetron Anaphylaxis and GI Intolerance   • Zofran [Ondansetron Hcl] Anaphylaxis   • Lortab [Hydrocodone-Acetaminophen] Other (See Comments) and Hallucinations     CLOSTROPHOBIC   • Allopurinol Other (See Comments)     Pain on right side     Medications:  Prior to Admission medications    Medication Sig Start Date End Date Taking? Authorizing Provider   albuterol sulfate  (90 Base) MCG/ACT inhaler USE 2 INHALATIONS FOUR TIMES A DAY AS NEEDED 7/12/21  Yes Tahira Mendez APRN   ALPRAZolam (XANAX) 0.25 MG tablet TAKE 1 TABLET BY MOUTH AT NIGHT AS NEEDED FOR ANXIETY 6/28/22  Yes Trisha Soni DO   amLODIPine (NORVASC) 10 MG tablet TAKE 1 TABLET DAILY 8/2/21  Yes Jeo Velasco MD   aspirin (aspirin) 81 MG EC tablet Take  by mouth Daily.   Yes ProviderTwyla MD   atorvastatin (LIPITOR) 10 MG tablet TAKE 1 TABLET BY MOUTH DAILY 5/6/22  Yes Eleuterio Farley MD   azelastine (ASTELIN) 0.1 % nasal spray USE 1 SPRAY IN EACH NOSTRIL TWICE A DAY AS NEEDED 2/8/22  Yes Tahira Mendez APRN   cholestyramine (QUESTRAN) 4 g packet MIX AND DRINK 1 PACKET DAILY 5/23/22  Yes Adrien Brewster MD   cloNIDine (CATAPRES) 0.2 MG tablet TAKE 3 TABLETS DAILY 9/7/21  Yes Joe Velasco MD   clopidogrel (PLAVIX) 75 MG tablet TAKE 1 TABLET DAILY 7/12/22  Yes Elena Jon APRN   Copper Gluconate 2 MG tablet Take 2 mg by mouth Daily. 7/6/21  Yes Goldberg, Amit, MD   diphenhydrAMINE (BENADRYL) 25 mg capsule Take 25 mg by mouth Every 6 (Six) Hours As Needed for Itching.   Yes ProviderTwyla MD   fexofenadine (ALLEGRA) 180 MG tablet Take 180 mg by mouth Daily.   Yes  "ProviderTwyla MD   fluticasone (FLONASE) 50 MCG/ACT nasal spray 2 sprays into the nostril(s) as directed by provider Daily As Needed for Rhinitis.   Yes Twyla Guevara MD   hydrALAZINE (APRESOLINE) 25 MG tablet Take 1 tablet by mouth 3 (Three) Times a Day. 4/18/22  Yes Trisha Soni DO   levothyroxine (Synthroid) 75 MCG tablet Take 1 tablet by mouth Daily. 4/29/22  Yes Ruthie Parra APRN   magnesium chloride ER 64 MG DR tablet Take 143 mg by mouth Daily.   Yes Twyla Guevara MD   Melatonin 10 MG capsule Take 2 capsules by mouth Every Night.   Yes Twyla Guevara MD   mesalamine (APRISO) 0.375 g 24 hr capsule TAKE 4 CAPSULES DAILY 1/31/22  Yes Arlen Temple APRN   metoprolol succinate XL (TOPROL-XL) 25 MG 24 hr tablet TAKE 1 TABLET DAILY 5/9/22  Yes Trisha Soni DO   Multiple Vitamins-Minerals (PRESERVISION/LUTEIN) capsule Take 1 capsule by mouth 2 (two) times a day.   Yes Twyla Guevara MD   omeprazole (priLOSEC) 20 MG capsule TAKE 1 CAPSULE DAILY 2/8/22  Yes Tahira Mendez APRN   potassium chloride 10 MEQ CR tablet TAKE 1 TABLET TWICE A DAY 11/29/21  Yes Tahira Mendez APRN   sodium bicarbonate 650 MG tablet Take 0.5 tablets by mouth 2 (Two) Times a Day.  Patient taking differently: Take 325 mg by mouth 3 (Three) Times a Day. 11/3/21  Yes Dc Issa DO   valsartan (DIOVAN) 160 MG tablet Take 160 mg by mouth Daily.   Yes ProviderTwyla MD   vitamin D (ERGOCALCIFEROL) 1.25 MG (28322 UT) capsule capsule Take 1 capsule by mouth 1 (One) Time Per Week. 9/14/21  Yes Arlen Temple APRN   methylPREDNISolone (MEDROL) 4 MG dose pack Take as directed on package instructions. 7/19/22   Maria L Zambrano APRN       Objective     Vital Signs: /66 (BP Location: Left arm, Patient Position: Sitting, Cuff Size: Adult)   Pulse 65   Temp 97.1 °F (36.2 °C) (Temporal)   Ht 182.9 cm (72\")   Wt 76.2 kg (168 lb)   SpO2 97%   " BMI 22.78 kg/m²   Physical Exam  Vitals and nursing note reviewed.   Constitutional:       General: He is not in acute distress.     Appearance: He is not ill-appearing or toxic-appearing.   HENT:      Head: Normocephalic and atraumatic.      Right Ear: Tympanic membrane is bulging.      Left Ear: Tympanic membrane is perforated (known).      Mouth/Throat:      Mouth: Mucous membranes are moist.      Pharynx: Oropharynx is clear.   Cardiovascular:      Rate and Rhythm: Normal rate and regular rhythm.      Pulses: Normal pulses.      Heart sounds: Normal heart sounds.   Pulmonary:      Effort: Pulmonary effort is normal.      Breath sounds: No wheezing, rhonchi or rales.   Abdominal:      General: Bowel sounds are normal. There is no distension.      Palpations: Abdomen is soft.      Tenderness: There is no abdominal tenderness.   Musculoskeletal:         General: No swelling or tenderness. Normal range of motion.      Cervical back: Normal range of motion and neck supple. No tenderness.   Skin:     General: Skin is warm and dry.      Findings: No erythema or rash.   Neurological:      General: No focal deficit present.      Mental Status: He is alert and oriented to person, place, and time.   Psychiatric:         Mood and Affect: Mood normal.         Behavior: Behavior normal.         Thought Content: Thought content normal.         Judgment: Judgment normal.       BMI is within normal parameters. No other follow-up for BMI required.      Assessment / Plan     Assessment/Plan:  Diagnoses and all orders for this visit:    1. Seasonal allergic rhinitis, unspecified trigger (Primary)    2. Fluid level behind tympanic membrane of right ear    Other orders  -     methylPREDNISolone (MEDROL) 4 MG dose pack; Take as directed on package instructions.  Dispense: 21 tablet; Refill: 0       Patient with complaints of allergies and ear pressure/fullness, worse with the more humid weather.  He will continue to take Allegra and  have advised him to use Flonase scheduled for the next several days.  We will also prescribe a Medrol Dosepak.  He is to call for any worsening symptoms.    Keep appointment with Dr. Soni in August, unless patient needs to be seen sooner or acute issues arise.    I have discussed the patient results/orders and and plan/recommendation with them at today's visit.      Maria L Zambrano, APRN   07/19/2022

## 2022-07-22 ENCOUNTER — LAB (OUTPATIENT)
Dept: LAB | Facility: HOSPITAL | Age: 72
End: 2022-07-22

## 2022-07-22 DIAGNOSIS — N18.4 ANEMIA DUE TO STAGE 4 CHRONIC KIDNEY DISEASE: ICD-10-CM

## 2022-07-22 DIAGNOSIS — D63.1 ANEMIA DUE TO STAGE 4 CHRONIC KIDNEY DISEASE: ICD-10-CM

## 2022-07-22 LAB
DEPRECATED RDW RBC AUTO: 47.2 FL (ref 37–54)
ERYTHROCYTE [DISTWIDTH] IN BLOOD BY AUTOMATED COUNT: 13.2 % (ref 12.3–15.4)
HCT VFR BLD AUTO: 32.7 % (ref 37.5–51)
HGB BLD-MCNC: 10.8 G/DL (ref 13–17.7)
MCH RBC QN AUTO: 32 PG (ref 26.6–33)
MCHC RBC AUTO-ENTMCNC: 33 G/DL (ref 31.5–35.7)
MCV RBC AUTO: 96.7 FL (ref 79–97)
PLATELET # BLD AUTO: 135 10*3/MM3 (ref 140–450)
PMV BLD AUTO: 9.8 FL (ref 6–12)
RBC # BLD AUTO: 3.38 10*6/MM3 (ref 4.14–5.8)
WBC NRBC COR # BLD: 8.04 10*3/MM3 (ref 3.4–10.8)

## 2022-07-22 PROCEDURE — 85027 COMPLETE CBC AUTOMATED: CPT

## 2022-07-22 PROCEDURE — 36415 COLL VENOUS BLD VENIPUNCTURE: CPT

## 2022-07-25 RX ORDER — AMLODIPINE BESYLATE 10 MG/1
10 TABLET ORAL DAILY
Qty: 90 TABLET | Refills: 3 | Status: SHIPPED | OUTPATIENT
Start: 2022-07-25

## 2022-07-26 ENCOUNTER — TELEPHONE (OUTPATIENT)
Dept: INTERNAL MEDICINE | Facility: CLINIC | Age: 72
End: 2022-07-26

## 2022-07-26 RX ORDER — DOXYCYCLINE HYCLATE 100 MG/1
100 CAPSULE ORAL 2 TIMES DAILY
Qty: 20 CAPSULE | Refills: 0 | Status: SHIPPED | OUTPATIENT
Start: 2022-07-26 | End: 2022-08-05

## 2022-07-26 NOTE — TELEPHONE ENCOUNTER
Caller: Ricardo Hugo    Relationship: Self    Best call back number: [phone]    What medication are you requesting: MEDICATION     What are your current symptoms: STOPPED UP EARS     How long have you been experiencing symptoms: ABOUT A WEEK     Have you had these symptoms before:    [x] Yes  [] No    Have you been treated for these symptoms before:   [x] Yes  [] No    If a prescription is needed, what is your preferred pharmacy and phone number: [id number] - [place], [place] - [address] AT [facility] [address]  [phone] [facility] [phone] FX

## 2022-07-29 ENCOUNTER — LAB (OUTPATIENT)
Dept: LAB | Facility: HOSPITAL | Age: 72
End: 2022-07-29

## 2022-07-29 DIAGNOSIS — D63.1 ANEMIA DUE TO STAGE 4 CHRONIC KIDNEY DISEASE: ICD-10-CM

## 2022-07-29 DIAGNOSIS — N18.4 ANEMIA DUE TO STAGE 4 CHRONIC KIDNEY DISEASE: ICD-10-CM

## 2022-07-29 LAB
DEPRECATED RDW RBC AUTO: 47.5 FL (ref 37–54)
ERYTHROCYTE [DISTWIDTH] IN BLOOD BY AUTOMATED COUNT: 13.4 % (ref 12.3–15.4)
HCT VFR BLD AUTO: 33.3 % (ref 37.5–51)
HGB BLD-MCNC: 10.9 G/DL (ref 13–17.7)
MCH RBC QN AUTO: 31.5 PG (ref 26.6–33)
MCHC RBC AUTO-ENTMCNC: 32.7 G/DL (ref 31.5–35.7)
MCV RBC AUTO: 96.2 FL (ref 79–97)
PLATELET # BLD AUTO: 113 10*3/MM3 (ref 140–450)
PMV BLD AUTO: 10 FL (ref 6–12)
RBC # BLD AUTO: 3.46 10*6/MM3 (ref 4.14–5.8)
WBC NRBC COR # BLD: 7.63 10*3/MM3 (ref 3.4–10.8)

## 2022-07-29 PROCEDURE — 85027 COMPLETE CBC AUTOMATED: CPT

## 2022-07-29 PROCEDURE — 36415 COLL VENOUS BLD VENIPUNCTURE: CPT

## 2022-08-01 ENCOUNTER — TELEPHONE (OUTPATIENT)
Dept: ONCOLOGY | Facility: CLINIC | Age: 72
End: 2022-08-01

## 2022-08-01 ENCOUNTER — TELEPHONE (OUTPATIENT)
Dept: ENT CLINIC | Age: 72
End: 2022-08-01

## 2022-08-01 NOTE — TELEPHONE ENCOUNTER
Anastacia Preciado requests that office  return their call. The best time to reach him is Anytime. Patient needs to get appointment soon both ears stopped up has hold in his right ear, Please call the patient with appointment    Thank you.

## 2022-08-01 NOTE — TELEPHONE ENCOUNTER
Received message that Mr. Hugo would like a call relating to his platelets.  I personally called him and spoke to him about this.  Advised that his platelets are very stable at 113 and that this is likely secondary to his cirrhotic liver.  Encourage patient that there is no indicated intervention for thrombocytopenia until platelets are less than 20,000 and or there is bleeding.  Patient voiced understanding and stated all questions were answered.

## 2022-08-01 NOTE — TELEPHONE ENCOUNTER
Caller: Ricardo Hugo    Relationship: Self    Best call back number: [phone]    What is the best time to reach you: ANYTIME    Who are you requesting to speak with (clinical staff, provider,  specific staff member): GARRET KATZ OR NURSE    What was the call regarding: PT WOULD LIKE SOMEONE TO CALL HIM TO DISCUSS HIS LAB RESULTS FROM LAST WEEK. HE IS CONCERNED ABOUT HIS PLATELETS.     Do you require a callback: YES

## 2022-08-02 ENCOUNTER — OFFICE VISIT (OUTPATIENT)
Dept: INTERNAL MEDICINE | Facility: CLINIC | Age: 72
End: 2022-08-02

## 2022-08-02 VITALS
HEART RATE: 55 BPM | OXYGEN SATURATION: 98 % | SYSTOLIC BLOOD PRESSURE: 132 MMHG | HEIGHT: 72 IN | BODY MASS INDEX: 22.62 KG/M2 | TEMPERATURE: 97.1 F | DIASTOLIC BLOOD PRESSURE: 64 MMHG | WEIGHT: 167 LBS

## 2022-08-02 DIAGNOSIS — H65.91 FLUID LEVEL BEHIND TYMPANIC MEMBRANE OF RIGHT EAR: Primary | ICD-10-CM

## 2022-08-02 DIAGNOSIS — J31.0 CHRONIC RHINITIS: ICD-10-CM

## 2022-08-02 PROCEDURE — 99213 OFFICE O/P EST LOW 20 MIN: CPT | Performed by: NURSE PRACTITIONER

## 2022-08-02 PROCEDURE — 96372 THER/PROPH/DIAG INJ SC/IM: CPT | Performed by: NURSE PRACTITIONER

## 2022-08-02 RX ORDER — METHYLPREDNISOLONE 4 MG/1
TABLET ORAL
Qty: 21 TABLET | Refills: 0 | Status: SHIPPED | OUTPATIENT
Start: 2022-08-02 | End: 2022-08-08

## 2022-08-02 RX ORDER — MONTELUKAST SODIUM 10 MG/1
10 TABLET ORAL NIGHTLY
Qty: 30 TABLET | Refills: 3 | Status: SHIPPED | OUTPATIENT
Start: 2022-08-02 | End: 2022-11-28

## 2022-08-02 RX ORDER — TRIAMCINOLONE ACETONIDE 40 MG/ML
40 INJECTION, SUSPENSION INTRA-ARTICULAR; INTRAMUSCULAR ONCE
Status: COMPLETED | OUTPATIENT
Start: 2022-08-02 | End: 2022-08-02

## 2022-08-02 RX ADMIN — TRIAMCINOLONE ACETONIDE 40 MG: 40 INJECTION, SUSPENSION INTRA-ARTICULAR; INTRAMUSCULAR at 13:22

## 2022-08-02 NOTE — PROGRESS NOTES
Subjective     Chief Complaint:  Ears full    HPI:  Mr. Hugo presents to the office today with complaints of ear pressure and fullness.  He does have a longstanding history of this, and symptoms do seem to vary depending on the weather.  I last saw him in the office on 7/19 with these complaints and he was prescribed a Medrol Dosepak.  The patient states that this did help a little, but does not completely relieve the pressure.  He has been taking Flonase and Astelin nasal sprays daily as well as Allegra.  He had ongoing complaints on 7/26 at which point he was prescribed antibiotic doxycycline.  He still has 4 pills left of this to take.  He does feel this has helped a little as well.  He denies any cough or congestion.  He states he has a very minimal runny nose.  He denies sore throat, fever or chills.  He denies any body or joint aches.  He denies shortness of breath or chest pain.    Patient's PMR from outside medical facility reviewed and noted.    Past Medical History:   Past Medical History:   Diagnosis Date   • 3-vessel CAD 8/11/2020   • Allergic rhinitis    • Anxiety disorder 4/27/2020   • Arthritis    • Asymmetrical sensorineural hearing loss 6/28/2017   • Atherosclerosis of native artery of both lower extremities with intermittent claudication ([facility]) 7/18/2019   • Avascular necrosis of femoral head, left ([facility]) 07/11/2020    right hip after surgery   • Carotid stenosis    • Chronic mucoid otitis media    • Chronic rhinitis    • Coronary artery disease     HEART BYPASS 2004   • Crohn's disease of large intestine with other complication ([facility]) 7/30/2020    Chronic diarrhea Colonoscopy July 2020 revealed mild patchy scattered hemosiderin staining with inflammation more so in rectosigmoid area.  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   • Iron deficiency anemia 7/14/2020   • Mixed hearing loss of left ear    • PAD (peripheral artery disease) ([facility]) 8/11/2020   • Perianal abscess    • Pernicious anemia 08/17/2020    took shots but never diagnosed with b12 deficiency   • Personal history of alcoholism ([facility]) 08/11/2020    quit drinking in 2013   • Prostatic hypertrophy 8/11/2020   • Sensorineural hearing loss    • Sepsis with acute renal failure ([facility]) 9/15/2020   • Shortness of breath 5/27/2021   • Tinnitus    • Ventricular tachycardia, nonsustained ([facility]) 7/14/2020   • Weight loss 7/11/2020     Past Surgical History:  Past Surgical History:   Procedure Laterality Date   • ARTERY SURGERY  2021    right side on neck   • CAROTID ENDARTERECTOMY Right 5/10/2021    Procedure: RIGHT CAROTID ENDARTERECTOMY WITH EEG;  Surgeon: Gil Pineda DO;  Location: [facility] OR ;  Service: Vascular;  Laterality: Right;   • COLONOSCOPY N/A 7/2/2020    Procedure: COLONOSCOPY WITH ANESTHESIA;  Surgeon: Adrien Brewster MD;  Location: [facility] ENDOSCOPY;  Service: Gastroenterology;  Laterality: N/A;  pre op: diarrhea  post op: polyps  PCP: Joe Velasco MD   • COLONOSCOPY N/A 10/13/2020    Procedure: COLONOSCOPY WITH ANESTHESIA;  Surgeon: Adrien Brewster MD;  Location: [facility] ENDOSCOPY;  Service: Gastroenterology;  Laterality: N/A;  Pre: Chronic Diarrhea, Crohn's  Post: AVM  Dr. Neftali Velasco  CO2 Inflation Used   • CORONARY ARTERY BYPASS GRAFT  2003    x3   • ENDOSCOPY N/A 11/2/2021    Procedure: ESOPHAGOGASTRODUODENOSCOPY WITH ANESTHESIA;  Surgeon: Bridger Bell MD;  Location: [facility] ENDOSCOPY;  Service: Gastroenterology;  Laterality: N/A;  pre anemia;gi bleed  post  gi bleed;schatski ring  Dr. ERIC Velasco   • EYE SURGERY Bilateral     catorac   • INCISION AND DRAINAGE  PERIRECTAL ABSCESS N/A 3/3/2017    Procedure: INCISION AND DRAINAGE OF JEET ANAL ABSCESS;  Surgeon: Lynette Smith MD;  Location: [facility] OR;  Service:    • MYRINGOTOMY W/ TUBES Left 04/17/2017    06/10/2016   • TONSILLECTOMY     • TOTAL HIP ARTHROPLASTY Right 2006     Social History:  reports that he quit smoking about 8 years ago. His smoking use included cigarettes. He started smoking about 34 years ago. He has a 12.50 pack-year smoking history. He has never used smokeless tobacco. He reports previous alcohol use. He reports that he does not use drugs.    Family History: family history includes No Known Problems in his daughter, father, and mother.      Allergies:  Allergies   Allergen Reactions   • Ondansetron Anaphylaxis and GI Intolerance   • Zofran [Ondansetron Hcl] Anaphylaxis   • Lortab [Hydrocodone-Acetaminophen] Other (See Comments) and Hallucinations     CLOSTROPHOBIC   • Allopurinol Other (See Comments)     Pain on right side     Medications:  Prior to Admission medications    Medication Sig Start Date End Date Taking? Authorizing Provider   albuterol sulfate  (90 Base) MCG/ACT inhaler USE 2 INHALATIONS FOUR TIMES A DAY AS NEEDED 7/12/21  Yes Tahira Mendez APRN   ALPRAZolam (XANAX) 0.25 MG tablet TAKE 1 TABLET BY MOUTH AT NIGHT AS NEEDED FOR ANXIETY 6/28/22  Yes Trisha Soni, DO   amLODIPine (NORVASC) 10 MG tablet Take 1 tablet by mouth Daily. 7/25/22  Yes Trisha Soni, DO   aspirin (aspirin) 81 MG EC tablet Take  by mouth Daily.   Yes Provider, MD Twyla   atorvastatin (LIPITOR) 10 MG tablet TAKE 1 TABLET BY MOUTH DAILY 5/6/22  Yes Eleuterio Farley MD   azelastine (ASTELIN) 0.1 % nasal spray USE 1 SPRAY IN EACH NOSTRIL TWICE A DAY AS NEEDED 2/8/22  Yes Tahira Mendez APRN   cholestyramine (QUESTRAN) 4 g packet MIX AND DRINK 1 PACKET DAILY 5/23/22  Yes Adrien Brewster MD   cloNIDine (CATAPRES) 0.2 MG tablet TAKE 3 TABLETS DAILY 9/7/21   Yes Joe Velasco MD   clopidogrel (PLAVIX) 75 MG tablet TAKE 1 TABLET DAILY 7/12/22  Yes Elena Jon APRN   Copper Gluconate 2 MG tablet Take 2 mg by mouth Daily. 7/6/21  Yes Goldberg, Amit, MD   diphenhydrAMINE (BENADRYL) 25 mg capsule Take 25 mg by mouth Every 6 (Six) Hours As Needed for Itching.   Yes ProviderTwyla MD   doxycycline (VIBRAMYCIN) 100 MG capsule Take 1 capsule by mouth 2 (Two) Times a Day for 10 days. 7/26/22 8/5/22 Yes Maria L Zambrano APRN   fexofenadine (ALLEGRA) 180 MG tablet Take 180 mg by mouth Daily.   Yes ProviderTwyla MD   fluticasone (FLONASE) 50 MCG/ACT nasal spray 2 sprays into the nostril(s) as directed by provider Daily As Needed for Rhinitis.   Yes ProviderTwyla MD   hydrALAZINE (APRESOLINE) 25 MG tablet Take 1 tablet by mouth 3 (Three) Times a Day. 4/18/22  Yes Trisha Soni DO   levothyroxine (Synthroid) 75 MCG tablet Take 1 tablet by mouth Daily. 4/29/22  Yes Ruthie Parra APRN   magnesium chloride ER 64 MG DR tablet Take 143 mg by mouth Daily.   Yes ProviderTwyla MD   Melatonin 10 MG capsule Take 2 capsules by mouth Every Night.   Yes ProviderTwyla MD   mesalamine (APRISO) 0.375 g 24 hr capsule TAKE 4 CAPSULES DAILY 1/31/22  Yes Arlen Temple APRN   metoprolol succinate XL (TOPROL-XL) 25 MG 24 hr tablet TAKE 1 TABLET DAILY 5/9/22  Yes Trisha Snoi DO   Multiple Vitamins-Minerals (PRESERVISION/LUTEIN) capsule Take 1 capsule by mouth 2 (two) times a day.   Yes Twyla Guevara MD   omeprazole (priLOSEC) 20 MG capsule TAKE 1 CAPSULE DAILY 2/8/22  Yes Tahira Mendez APRN   potassium chloride 10 MEQ CR tablet TAKE 1 TABLET TWICE A DAY 11/29/21  Yes Tahira Mendez APRN   sodium bicarbonate 650 MG tablet Take 0.5 tablets by mouth 2 (Two) Times a Day.  Patient taking differently: Take 325 mg by mouth 3 (Three) Times a Day. 11/3/21  Yes Dc Issa, DO alvarezsartan (DIOVAN)  "160 MG tablet Take 160 mg by mouth Daily.   Yes Provider, MD Twyla   vitamin D (ERGOCALCIFEROL) 1.25 MG (92992 UT) capsule capsule Take 1 capsule by mouth 1 (One) Time Per Week. 9/14/21  Yes Arlen Temple APRN   methylPREDNISolone (MEDROL) 4 MG dose pack Take as directed on package instructions. 7/19/22 8/2/22 Yes Maria L Zambrano APRN   methylPREDNISolone (MEDROL) 4 MG dose pack Take as directed on package instructions. 8/2/22   Maria L Zambrano APRN   montelukast (Singulair) 10 MG tablet Take 1 tablet by mouth Every Night. 8/2/22   Maria L Zambrano APRN       Objective     Vital Signs: /64 (BP Location: Left arm, Patient Position: Sitting, Cuff Size: Adult)   Pulse 55   Temp 97.1 °F (36.2 °C) (Temporal)   Ht 182.9 cm (72\")   Wt 75.8 kg (167 lb)   SpO2 98%   BMI 22.65 kg/m²   Physical Exam  Vitals and nursing note reviewed.   Constitutional:       General: He is not in acute distress.     Appearance: He is not ill-appearing or toxic-appearing.   HENT:      Head: Normocephalic and atraumatic.      Right Ear: Tympanic membrane is bulging.      Left Ear: Tympanic membrane is perforated (known).   Hard of hearing     Mouth/Throat:      Mouth: Mucous membranes are moist.      Pharynx: Oropharynx is clear.   Cardiovascular:      Rate and Rhythm: Normal rate and regular rhythm.      Pulses: Normal pulses.      Heart sounds: Normal heart sounds.   Pulmonary:      Effort: Pulmonary effort is normal.      Breath sounds: No wheezing, rhonchi or rales.   Abdominal:      General: Bowel sounds are normal. There is no distension.      Palpations: Abdomen is soft.      Tenderness: There is no abdominal tenderness.   Musculoskeletal:         General: No swelling or tenderness. Normal range of motion.      Cervical back: Normal range of motion and neck supple. No tenderness.   Skin:     General: Skin is warm and dry.      Findings: No erythema or rash.   Neurological:      General: No focal deficit " present.      Mental Status: He is alert and oriented to person, place, and time.   Psychiatric:         Mood and Affect: Mood normal.         Behavior: Behavior normal.         Thought Content: Thought content normal.         Judgment: Judgment normal.   BMI is within normal parameters. No other follow-up for BMI required.    Results Reviewed:  Reviewed last office visit note from 7/19.    Assessment / Plan     Assessment/Plan:  Diagnoses and all orders for this visit:    1. Fluid level behind tympanic membrane of right ear (Primary)  -     triamcinolone acetonide (KENALOG-40) injection 40 mg    2. Chronic rhinitis    Other orders  -     montelukast (Singulair) 10 MG tablet; Take 1 tablet by mouth Every Night.  Dispense: 30 tablet; Refill: 3  -     methylPREDNISolone (MEDROL) 4 MG dose pack; Take as directed on package instructions.  Dispense: 21 tablet; Refill: 0       The patient presents to the office today with ongoing complaints of ear fullness and pressure, first noted on 7/19.  He did complete a Medrol Dosepak and his currently almost finished with a course of doxycycline, unfortunately has ongoing symptoms.  I have advised him to continue using Flonase, Astelin, Allegra daily.  We will trial Singulair.  We will also give a Kenalog injection in the office today followed by an additional Medrol Dosepak.  Patient is aware to call for any worsening of symptoms or if he does not experience improvement.    Keep scheduled follow-up with Dr. Soni in 2 weeks, unless patient needs to be seen sooner or acute issues arise.    I have discussed the patient results/orders and and plan/recommendation with them at today's visit.      Maria L Zambrano, STERLING   08/02/2022

## 2022-08-05 ENCOUNTER — LAB (OUTPATIENT)
Dept: LAB | Facility: HOSPITAL | Age: 72
End: 2022-08-05

## 2022-08-05 DIAGNOSIS — N18.4 ANEMIA DUE TO STAGE 4 CHRONIC KIDNEY DISEASE: Primary | ICD-10-CM

## 2022-08-05 DIAGNOSIS — D63.1 ANEMIA DUE TO STAGE 4 CHRONIC KIDNEY DISEASE: Primary | ICD-10-CM

## 2022-08-05 LAB
DEPRECATED RDW RBC AUTO: 48.5 FL (ref 37–54)
ERYTHROCYTE [DISTWIDTH] IN BLOOD BY AUTOMATED COUNT: 13.5 % (ref 12.3–15.4)
HCT VFR BLD AUTO: 32.6 % (ref 37.5–51)
HGB BLD-MCNC: 10.5 G/DL (ref 13–17.7)
HOLD SPECIMEN: NORMAL
MCH RBC QN AUTO: 31.7 PG (ref 26.6–33)
MCHC RBC AUTO-ENTMCNC: 32.2 G/DL (ref 31.5–35.7)
MCV RBC AUTO: 98.5 FL (ref 79–97)
PLATELET # BLD AUTO: 136 10*3/MM3 (ref 140–450)
PMV BLD AUTO: 10.2 FL (ref 6–12)
RBC # BLD AUTO: 3.31 10*6/MM3 (ref 4.14–5.8)
WBC NRBC COR # BLD: 10.29 10*3/MM3 (ref 3.4–10.8)

## 2022-08-05 PROCEDURE — 36415 COLL VENOUS BLD VENIPUNCTURE: CPT

## 2022-08-05 PROCEDURE — 85027 COMPLETE CBC AUTOMATED: CPT

## 2022-08-08 ENCOUNTER — OFFICE VISIT (OUTPATIENT)
Dept: INTERNAL MEDICINE | Facility: CLINIC | Age: 72
End: 2022-08-08

## 2022-08-08 VITALS
WEIGHT: 169 LBS | HEART RATE: 67 BPM | OXYGEN SATURATION: 98 % | DIASTOLIC BLOOD PRESSURE: 68 MMHG | SYSTOLIC BLOOD PRESSURE: 146 MMHG | BODY MASS INDEX: 22.89 KG/M2 | HEIGHT: 72 IN | TEMPERATURE: 97.1 F

## 2022-08-08 DIAGNOSIS — H65.91 FLUID LEVEL BEHIND TYMPANIC MEMBRANE OF RIGHT EAR: Primary | ICD-10-CM

## 2022-08-08 PROCEDURE — 99213 OFFICE O/P EST LOW 20 MIN: CPT | Performed by: NURSE PRACTITIONER

## 2022-08-08 RX ORDER — PREDNISONE 10 MG/1
TABLET ORAL
Qty: 15 TABLET | Refills: 0 | Status: SHIPPED | OUTPATIENT
Start: 2022-08-08 | End: 2022-08-18

## 2022-08-08 NOTE — PROGRESS NOTES
Subjective     Chief Complaint:  Ear pressure/fullness    HPI:  The patient presents to the office today with complaints of ear pressure and fullness.  The patient was seen with these complaints on 7/19 at which point he was prescribed a Medrol Dosepak.  This did seem to help, but has not completely relieved pressure.  He had ongoing complaints on 7/26 at which point he was prescribed antibiotic therapy with doxycycline.  I saw him once again in the office on 8/2 with ongoing ear pressure and fullness.  He was given a Kenalog injection in the office and again prescribed a Medrol Dosepak.  He does feel as though his symptoms are getting better but states they are still not completely resolved.  He continues to take Flonase and Astelin nasal sprays as well as Allegra.  He has also been taking Singulair, which was prescribed at his last office visit.  He states the fullness comes and goes, and ears seem to be popping more.  His hearing loss is about the same.  He denies any sinus pain or pressure.  He denies cough or congestion.  He denies sore throat, fever/chills.  He has a very minimal runny nose.  He does have tendinitis, but feels this is no worse than baseline.    Patient's PMR from outside medical facility reviewed and noted.    Past Medical History:   Past Medical History:   Diagnosis Date   • 3-vessel CAD 8/11/2020   • Allergic rhinitis    • Anxiety disorder 4/27/2020   • Arthritis    • Asymmetrical sensorineural hearing loss 6/28/2017   • Atherosclerosis of native artery of both lower extremities with intermittent claudication ([facility]) 7/18/2019   • Avascular necrosis of femoral head, left ([facility]) 07/11/2020    right hip after surgery   • Carotid stenosis    • Chronic mucoid otitis media    • Chronic rhinitis    • Coronary artery disease     HEART BYPASS 2004   • Crohn's disease of large intestine with other complication ([facility]) 7/30/2020    Chronic diarrhea Colonoscopy July 2020 revealed mild patchy  scattered hemosiderin staining with inflammation more so in rectosigmoid area.  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   • Iron deficiency anemia 7/14/2020   • Mixed hearing loss of left ear    • PAD (peripheral artery disease) ([facility]) 8/11/2020   • Perianal abscess    • Pernicious anemia 08/17/2020    took shots but never diagnosed with b12 deficiency   • Personal history of alcoholism ([facility]) 08/11/2020    quit drinking in 2013   • Prostatic hypertrophy 8/11/2020   • Sensorineural hearing loss    • Sepsis with acute renal failure ([facility]) 9/15/2020   • Shortness of breath 5/27/2021   • Tinnitus    • Ventricular tachycardia, nonsustained ([facility]) 7/14/2020   • Weight loss 7/11/2020     Past Surgical History:  Past Surgical History:   Procedure Laterality Date   • ARTERY SURGERY  2021    right side on neck   • CAROTID ENDARTERECTOMY Right 5/10/2021    Procedure: RIGHT CAROTID ENDARTERECTOMY WITH EEG;  Surgeon: Gil Pineda DO;  Location: [facility] OR ;  Service: Vascular;  Laterality: Right;   • COLONOSCOPY N/A 7/2/2020    Procedure: COLONOSCOPY WITH ANESTHESIA;  Surgeon: Adrien Brewster MD;  Location: [facility] ENDOSCOPY;  Service: Gastroenterology;  Laterality: N/A;  pre op: diarrhea  post op: polyps  PCP: Joe Velasco MD   • COLONOSCOPY N/A 10/13/2020    Procedure: COLONOSCOPY WITH ANESTHESIA;  Surgeon: Adrien Brewster MD;  Location: [facility] ENDOSCOPY;  Service: Gastroenterology;  Laterality: N/A;  Pre: Chronic Diarrhea, Crohn's  Post: AVM  Dr. Neftali Velasco  CO2 Inflation Used   • CORONARY ARTERY BYPASS GRAFT  2003    x3   • ENDOSCOPY N/A 11/2/2021    Procedure:  ESOPHAGOGASTRODUODENOSCOPY WITH ANESTHESIA;  Surgeon: Bridger Bell MD;  Location: [facility] ENDOSCOPY;  Service: Gastroenterology;  Laterality: N/A;  pre anemia;gi bleed  post  gi bleed;schatski ring  Dr. ERIC Velasco   • EYE SURGERY Bilateral     catorac   • INCISION AND DRAINAGE PERIRECTAL ABSCESS N/A 3/3/2017    Procedure: INCISION AND DRAINAGE OF JEET ANAL ABSCESS;  Surgeon: Lynette Smith MD;  Location: [facility] OR;  Service:    • MYRINGOTOMY W/ TUBES Left 04/17/2017    06/10/2016   • TONSILLECTOMY     • TOTAL HIP ARTHROPLASTY Right 2006     Social History:  reports that he quit smoking about 8 years ago. His smoking use included cigarettes. He started smoking about 34 years ago. He has a 12.50 pack-year smoking history. He has never used smokeless tobacco. He reports previous alcohol use. He reports that he does not use drugs.    Family History: family history includes No Known Problems in his daughter, father, and mother.      Allergies:  Allergies   Allergen Reactions   • Ondansetron Anaphylaxis and GI Intolerance   • Zofran [Ondansetron Hcl] Anaphylaxis   • Lortab [Hydrocodone-Acetaminophen] Other (See Comments) and Hallucinations     CLOSTROPHOBIC   • Allopurinol Other (See Comments)     Pain on right side     Medications:  Prior to Admission medications    Medication Sig Start Date End Date Taking? Authorizing Provider   albuterol sulfate  (90 Base) MCG/ACT inhaler USE 2 INHALATIONS FOUR TIMES A DAY AS NEEDED 7/12/21  Yes Tahira Mendez APRN   ALPRAZolam (XANAX) 0.25 MG tablet TAKE 1 TABLET BY MOUTH AT NIGHT AS NEEDED FOR ANXIETY 6/28/22  Yes Trisha Soni, DO   amLODIPine (NORVASC) 10 MG tablet Take 1 tablet by mouth Daily. 7/25/22  Yes Trisha Soni, DO   aspirin (aspirin) 81 MG EC tablet Take  by mouth Daily.   Yes Provider, MD Twyla   atorvastatin (LIPITOR) 10 MG tablet TAKE 1 TABLET BY MOUTH DAILY 5/6/22  Yes Eleuterio Farley MD   azelastine (ASTELIN)  0.1 % nasal spray USE 1 SPRAY IN EACH NOSTRIL TWICE A DAY AS NEEDED 2/8/22  Yes Tahira Mendez APRN   cholestyramine (QUESTRAN) 4 g packet MIX AND DRINK 1 PACKET DAILY 5/23/22  Yes Adrien Brewster MD   cloNIDine (CATAPRES) 0.2 MG tablet TAKE 3 TABLETS DAILY 9/7/21  Yes Joe Velasco MD   clopidogrel (PLAVIX) 75 MG tablet TAKE 1 TABLET DAILY 7/12/22  Yes Elena Jon APRN   Copper Gluconate 2 MG tablet Take 2 mg by mouth Daily. 7/6/21  Yes Goldberg, Amit, MD   diphenhydrAMINE (BENADRYL) 25 mg capsule Take 25 mg by mouth Every 6 (Six) Hours As Needed for Itching.   Yes ProviderTwyla MD   fexofenadine (ALLEGRA) 180 MG tablet Take 180 mg by mouth Daily.   Yes ProviderTwyla MD   fluticasone (FLONASE) 50 MCG/ACT nasal spray 2 sprays into the nostril(s) as directed by provider Daily As Needed for Rhinitis.   Yes ProviderTwyla MD   hydrALAZINE (APRESOLINE) 25 MG tablet Take 1 tablet by mouth 3 (Three) Times a Day. 4/18/22  Yes Trisha Soni DO   levothyroxine (Synthroid) 75 MCG tablet Take 1 tablet by mouth Daily. 4/29/22  Yes Ruthie Parra APRN   magnesium chloride ER 64 MG DR tablet Take 143 mg by mouth Daily.   Yes ProviderTwyla MD   Melatonin 10 MG capsule Take 2 capsules by mouth Every Night.   Yes ProviderTwyla MD   mesalamine (APRISO) 0.375 g 24 hr capsule TAKE 4 CAPSULES DAILY 1/31/22  Yes Arlen Temple APRN   metoprolol succinate XL (TOPROL-XL) 25 MG 24 hr tablet TAKE 1 TABLET DAILY 5/9/22  Yes Trisha Soni DO   montelukast (Singulair) 10 MG tablet Take 1 tablet by mouth Every Night. 8/2/22  Yes Maria L Zambrano APRN   Multiple Vitamins-Minerals (PRESERVISION/LUTEIN) capsule Take 1 capsule by mouth 2 (two) times a day.   Yes ProviderTwyla MD   omeprazole (priLOSEC) 20 MG capsule TAKE 1 CAPSULE DAILY 2/8/22  Yes Tahira Mendez APRN   potassium chloride 10 MEQ CR tablet TAKE 1 TABLET TWICE A DAY  "11/29/21  Yes Tahira Mendez APRN   sodium bicarbonate 650 MG tablet Take 0.5 tablets by mouth 2 (Two) Times a Day.  Patient taking differently: Take 325 mg by mouth 3 (Three) Times a Day. 11/3/21  Yes Dc Issa DO   valsartan (DIOVAN) 160 MG tablet Take 160 mg by mouth Daily.   Yes Provider, MD Twyla   vitamin D (ERGOCALCIFEROL) 1.25 MG (74712 UT) capsule capsule Take 1 capsule by mouth 1 (One) Time Per Week. 9/14/21  Yes Arlen Temple APRN   predniSONE (DELTASONE) 10 MG tablet Take 2 tablets by mouth Daily for 5 days, THEN 1 tablet Daily for 5 days. 8/8/22 8/18/22  Maria L Zambrano APRN   methylPREDNISolone (MEDROL) 4 MG dose pack Take as directed on package instructions. 8/2/22 8/8/22  Maria L Zambrano APRN       Objective     Vital Signs: /68 (BP Location: Left arm, Patient Position: Sitting, Cuff Size: Adult)   Pulse 67   Temp 97.1 °F (36.2 °C) (Temporal)   Ht 182.9 cm (72\")   Wt 76.7 kg (169 lb)   SpO2 98%   BMI 22.92 kg/m²   Vitals and nursing note reviewed.   Constitutional:       General: He is not in acute distress.     Appearance: He is not ill-appearing or toxic-appearing.   HENT:      Head: Normocephalic and atraumatic.      Right Ear: Tympanic membrane is bulging.      Left Ear: Tympanic membrane is perforated (known).   Hard of hearing     Mouth/Throat:      Mouth: Mucous membranes are moist.      Pharynx: Oropharynx is clear.   Cardiovascular:      Rate and Rhythm: Normal rate and regular rhythm.      Pulses: Normal pulses.      Heart sounds: Normal heart sounds.   Pulmonary:      Effort: Pulmonary effort is normal.      Breath sounds: No wheezing, rhonchi or rales.   Abdominal:      General: Bowel sounds are normal. There is no distension.      Palpations: Abdomen is soft.      Tenderness: There is no abdominal tenderness.   Musculoskeletal:         General: No swelling or tenderness. Normal range of motion.      Cervical back: Normal range of " motion and neck supple. No tenderness.   Skin:     General: Skin is warm and dry.      Findings: No erythema or rash.   Neurological:      General: No focal deficit present.      Mental Status: He is alert and oriented to person, place, and time.   Psychiatric:         Mood and Affect: Mood normal.         Behavior: Behavior normal.         Thought Content: Thought content normal.         Judgment: Judgment normal.   BMI is within normal parameters. No other follow-up for BMI required.  BMI is within normal parameters. No other follow-up for BMI required.    Results Reviewed:  Reviewed patient's last office visit note from 8/2.    Assessment / Plan     Assessment/Plan:  Diagnoses and all orders for this visit:    1. Fluid level behind tympanic membrane of right ear (Primary)    Other orders  -     predniSONE (DELTASONE) 10 MG tablet; Take 2 tablets by mouth Daily for 5 days, THEN 1 tablet Daily for 5 days.  Dispense: 15 tablet; Refill: 0       Patient with a greater than 3-week history of right ear pressure and fullness.  He has been treated with 2 rounds of Medrol Dosepaks, doxycycline, and a Kenalog injection in the office.  He has been taking home medications Astelin, Flonase, and Allegra.  Singulair was added to his regimen during his last office visit on 8/2.  The patient apparently had an appointment with ENT at [facility] and canceled this, and I have encouraged him to reach out to them to reschedule this.  He does have a known perforation of the left eardrum and continues to have difficulty with fluid behind the right tympanic membrane.  We will prescribe another course of prednisone.  I have counseled him on the frequent use of steroids and their effect on the immune system, especially given his history of CLL.    Patient has a scheduled office visit with Dr. Soni next week, unless patient needs to be seen sooner or acute issues arise.    I have discussed the patient results/orders and and  plan/recommendation with them at today's visit.      Maria L Zambrano, STERLING   08/08/2022

## 2022-08-11 ENCOUNTER — OFFICE VISIT (OUTPATIENT)
Dept: ENT CLINIC | Age: 72
End: 2022-08-11
Payer: MEDICARE

## 2022-08-11 VITALS — DIASTOLIC BLOOD PRESSURE: 64 MMHG | HEIGHT: 72 IN | SYSTOLIC BLOOD PRESSURE: 182 MMHG | BODY MASS INDEX: 23.06 KG/M2

## 2022-08-11 DIAGNOSIS — H72.92 TYMPANIC MEMBRANE PERFORATION, LEFT: ICD-10-CM

## 2022-08-11 DIAGNOSIS — H69.81 EUSTACHIAN TUBE DYSFUNCTION, RIGHT: Primary | ICD-10-CM

## 2022-08-11 PROCEDURE — 1123F ACP DISCUSS/DSCN MKR DOCD: CPT | Performed by: PHYSICIAN ASSISTANT

## 2022-08-11 PROCEDURE — G8420 CALC BMI NORM PARAMETERS: HCPCS | Performed by: PHYSICIAN ASSISTANT

## 2022-08-11 PROCEDURE — G8427 DOCREV CUR MEDS BY ELIG CLIN: HCPCS | Performed by: PHYSICIAN ASSISTANT

## 2022-08-11 PROCEDURE — 3017F COLORECTAL CA SCREEN DOC REV: CPT | Performed by: PHYSICIAN ASSISTANT

## 2022-08-11 PROCEDURE — 1036F TOBACCO NON-USER: CPT | Performed by: PHYSICIAN ASSISTANT

## 2022-08-11 PROCEDURE — 99213 OFFICE O/P EST LOW 20 MIN: CPT | Performed by: PHYSICIAN ASSISTANT

## 2022-08-11 RX ORDER — OXYMETAZOLINE HYDROCHLORIDE 0.05 G/100ML
SPRAY NASAL
Qty: 30 ML | Refills: 0 | Status: SHIPPED | OUTPATIENT
Start: 2022-08-11

## 2022-08-11 RX ORDER — FLUTICASONE PROPIONATE 50 MCG
2 SPRAY, SUSPENSION (ML) NASAL 2 TIMES DAILY
Qty: 16 G | Refills: 2 | Status: SHIPPED | OUTPATIENT
Start: 2022-08-11

## 2022-08-11 RX ORDER — POTASSIUM CHLORIDE 750 MG/1
10 TABLET, FILM COATED, EXTENDED RELEASE ORAL DAILY
COMMUNITY

## 2022-08-11 ASSESSMENT — ENCOUNTER SYMPTOMS
SORE THROAT: 0
EYE PAIN: 0
SINUS PAIN: 0
PHOTOPHOBIA: 0
RHINORRHEA: 0
TROUBLE SWALLOWING: 0
VOICE CHANGE: 0
SINUS PRESSURE: 0
FACIAL SWELLING: 0

## 2022-08-11 NOTE — ASSESSMENT & PLAN NOTE
Eustachian tube dysfunction of the right ear  Plan: I will place the patient on Afrin nasal spray and Flonase twice a day. Patient is follow-up in 2 weeks for reevaluation.

## 2022-08-11 NOTE — PROGRESS NOTES
[facility] OTOLARYNGOLOGY/ENT  Mr. Catherine Zhang is a pleasant 68-year-old  male that was referred by Dr. Talon Melendez due to problems with fullness to the right ear. Patient has a history of having a myringotomy tube in the past that was cared for by Dr. Anne Marie Knapp. He reports that he was noted with muffled hearing a couple of weeks ago that improved with over-the-counter medications. Due to the constant fullness, he is requesting evaluation to rule out infection. He also admits to having a chronic left TM perforation that has been present ever since the myringotomy tube had extruded. Allergies: Ondansetron hcl, Hydrocodone, Hydrocodone-acetaminophen, and Allopurinol      Current Outpatient Medications   Medication Sig Dispense Refill    potassium chloride (KLOR-CON) 10 MEQ extended release tablet Take 10 mEq by mouth in the morning. oxymetazoline (AFRIN) 0.05 % nasal spray 2 squirts to each nostril twice a day x10 days 30 mL 0    fluticasone (FLONASE) 50 MCG/ACT nasal spray 2 sprays by Each Nostril route in the morning and 2 sprays in the evening. 16 g 2    ALPRAZolam (XANAX) 0.25 MG tablet Take 0.25 mg by mouth nightly as needed.       atorvastatin (LIPITOR) 10 MG tablet TAKE 1 TABLET BY MOUTH DAILY      cholestyramine (QUESTRAN) 4 g packet       clopidogrel (PLAVIX) 75 MG tablet       Copper Gluconate 2 MG TABS Take 2 mg by mouth daily      ergocalciferol (ERGOCALCIFEROL) 1.25 MG (65964 UT) capsule       furosemide (LASIX) 20 MG tablet TAKE 1 TABLET BY MOUTH DAILY      hydrALAZINE (APRESOLINE) 25 MG tablet       Levothyroxine Sodium 75 MCG CAPS       mesalamine (APRISO) 0.375 g extended release capsule       albuterol sulfate HFA (PROVENTIL;VENTOLIN;PROAIR) 108 (90 Base) MCG/ACT inhaler Inhale 2 puffs into the lungs every 6 hours as needed for Wheezing      sodium bicarbonate 325 MG tablet Take 650 mg by mouth 2 times daily      diphenhydrAMINE (BENADRYL) 25 MG capsule Take 25 mg by mouth every 6 hours as needed      melatonin 10 MG CAPS capsule Take 2 capsules by mouth nightly      metoprolol succinate (TOPROL XL) 25 MG extended release tablet Take 25 mg by mouth daily      fexofenadine (ALLEGRA) 180 MG tablet Take 180 mg by mouth daily      aspirin 81 MG tablet Take 81 mg by mouth daily      azelastine (ASTELIN) 0.1 % nasal spray 2 sprays by Nasal route 2 times daily as needed for Rhinitis Use in each nostril as directed      cloNIDine (CATAPRES) 0.2 MG tablet Take 0.2 mg by mouth 3 times daily      amLODIPine (NORVASC) 10 MG tablet Take 10 mg by mouth daily      valsartan (DIOVAN) 80 MG tablet Take 160 mg by mouth daily       Multiple Vitamins-Minerals (PRESERVISION/LUTEIN PO) Take 5 mg by mouth daily      magnesium chloride (MAG DELAY 64) 535 (64 MG) MG TBCR CR tablet Take 64 mg by mouth daily      Omeprazole Magnesium 20.6 (20 BASE) MG CPDR Take by mouth daily       No current facility-administered medications for this visit. Past Surgical History:   Procedure Laterality Date    COLONOSCOPY  ? 2010    ?  Doctor or where    COLONOSCOPY N/A 3/10/2016    Dr Allen Escobar AP x 2 (-) dysplasia, BCM x 2, 5 yr recall    CORONARY ARTERY BYPASS GRAFT      CYST REMOVAL      SEBACEOUS    JOINT REPLACEMENT      RTH    TONSILLECTOMY AND ADENOIDECTOMY      TYMPANOSTOMY TUBE PLACEMENT Left        Past Medical History:   Diagnosis Date    Arthritis     Asthma     BPPV (benign paroxysmal positional vertigo)     CAD (coronary artery disease)     Colon polyps     Colon polyps     Dizziness     History of alcoholism ([facility] [address]     Hyperlipidemia     Hypertension     Low sodium levels     Otitis     Pharyngitis     Proteinuria     Sebaceous cyst     Sinusitis     URI (upper respiratory infection)        Family History   Problem Relation Age of Onset    Colon Cancer Neg Hx     Colon Polyps Neg Hx     Esophageal Cancer Neg Hx     Liver Disease Neg Hx     Liver Cancer Neg Hx     Rectal Cancer Neg Hx     Stomach Cancer Neg Hx Social History     Tobacco Use    Smoking status: Former     Packs/day: 1.00     Years: 12.00     Pack years: 12.00     Types: Cigarettes     Quit date: 3/10/2013     Years since quittin.4    Smokeless tobacco: Never   Substance Use Topics    Alcohol use: Yes     Alcohol/week: 2.0 standard drinks     Types: 2 Cans of beer per week     Comment: DAILY           REVIEW OF SYSTEMS:  all other systems reviewed and are negative  Review of Systems   Constitutional:  Negative for chills and fever. HENT:  Negative for congestion, dental problem, ear discharge, ear pain, facial swelling, hearing loss, postnasal drip, rhinorrhea, sinus pressure, sinus pain, sore throat, tinnitus, trouble swallowing and voice change. Eyes:  Negative for photophobia and pain. Neurological:  Negative for dizziness and headaches. Comments:     PHYSICAL EXAM:    BP (!) 182/64   Ht 6' (1.829 m)   BMI 23.06 kg/m²   Body mass index is 23.06 kg/m². General Appearance: well developed  and well nourished  Head/ Face: normocephalic and atraumatic  Vocal Quality: good/ normal  Ears: Right Ear: External: external ears normal Otoscopy Ear Canal: canal clear Otoscopy TM: TM's dull and TM's sluggish Left Ear: External: external ears normal Otoscopy Ear Canal: canal clear Otoscopy TM: TM's normal and TM's mobile  Hearing: Vivar R  Nose: septum midline and turbinates: engorged / congested  Neck: supple and adenopathy none palpable  Thyroid: normal  Oral exam demonstrated the tongue to be midline with no abnormalities noted to the posterior pharynx. Assessment & Plan:    Problem List Items Addressed This Visit       Tympanic membrane perforation, left      Chronic left TM perforation-currently stable  Plan: Patient was advised to try to wear earplugs when he showers or swims. We will continue monitoring the TM.          Eustachian tube dysfunction, right - Primary     Eustachian tube dysfunction of the right ear  Plan: I will place the patient on Afrin nasal spray and Flonase twice a day. Patient is follow-up in 2 weeks for reevaluation. No orders of the defined types were placed in this encounter. Orders Placed This Encounter   Medications    oxymetazoline (AFRIN) 0.05 % nasal spray     Si squirts to each nostril twice a day x10 days     Dispense:  30 mL     Refill:  0    fluticasone (FLONASE) 50 MCG/ACT nasal spray     Si sprays by Each Nostril route in the morning and 2 sprays in the evening. Dispense:  16 g     Refill:  2       Electronically signed by Noah Malik PA-C on 22 at 12:03 PM CDT        Please note that this chart was generated using dragon dictation software. Although every effort was made to ensure the accuracy of this automated transcription, some errors in transcription may have occurred.

## 2022-08-11 NOTE — ASSESSMENT & PLAN NOTE
Chronic left TM perforation-currently stable  Plan: Patient was advised to try to wear earplugs when he showers or swims. We will continue monitoring the TM.

## 2022-08-12 ENCOUNTER — LAB (OUTPATIENT)
Dept: LAB | Facility: HOSPITAL | Age: 72
End: 2022-08-12

## 2022-08-12 DIAGNOSIS — N18.4 ANEMIA DUE TO STAGE 4 CHRONIC KIDNEY DISEASE: Primary | ICD-10-CM

## 2022-08-12 DIAGNOSIS — D63.1 ANEMIA DUE TO STAGE 4 CHRONIC KIDNEY DISEASE: Primary | ICD-10-CM

## 2022-08-12 LAB
DEPRECATED RDW RBC AUTO: 48.2 FL (ref 37–54)
ERYTHROCYTE [DISTWIDTH] IN BLOOD BY AUTOMATED COUNT: 13.5 % (ref 12.3–15.4)
HCT VFR BLD AUTO: 33.9 % (ref 37.5–51)
HGB BLD-MCNC: 11.2 G/DL (ref 13–17.7)
HOLD SPECIMEN: NORMAL
MCH RBC QN AUTO: 32.2 PG (ref 26.6–33)
MCHC RBC AUTO-ENTMCNC: 33 G/DL (ref 31.5–35.7)
MCV RBC AUTO: 97.4 FL (ref 79–97)
PLATELET # BLD AUTO: 119 10*3/MM3 (ref 140–450)
PMV BLD AUTO: 10.2 FL (ref 6–12)
RBC # BLD AUTO: 3.48 10*6/MM3 (ref 4.14–5.8)
WBC NRBC COR # BLD: 8.24 10*3/MM3 (ref 3.4–10.8)

## 2022-08-12 PROCEDURE — 36415 COLL VENOUS BLD VENIPUNCTURE: CPT

## 2022-08-12 PROCEDURE — 85027 COMPLETE CBC AUTOMATED: CPT

## 2022-08-16 ENCOUNTER — OFFICE VISIT (OUTPATIENT)
Dept: INTERNAL MEDICINE | Facility: CLINIC | Age: 72
End: 2022-08-16

## 2022-08-16 VITALS
OXYGEN SATURATION: 99 % | BODY MASS INDEX: 22.75 KG/M2 | SYSTOLIC BLOOD PRESSURE: 150 MMHG | HEART RATE: 68 BPM | DIASTOLIC BLOOD PRESSURE: 70 MMHG | HEIGHT: 72 IN | TEMPERATURE: 97.1 F | WEIGHT: 168 LBS

## 2022-08-16 DIAGNOSIS — R01.1 MURMUR: ICD-10-CM

## 2022-08-16 DIAGNOSIS — Z79.899 ENCOUNTER FOR LONG-TERM (CURRENT) DRUG USE: ICD-10-CM

## 2022-08-16 DIAGNOSIS — H69.83 DYSFUNCTION OF BOTH EUSTACHIAN TUBES: ICD-10-CM

## 2022-08-16 DIAGNOSIS — N18.4 CRD (CHRONIC RENAL DISEASE), STAGE IV: ICD-10-CM

## 2022-08-16 DIAGNOSIS — I10 HYPERTENSION, BENIGN: Primary | ICD-10-CM

## 2022-08-16 DIAGNOSIS — K50.118 CROHN'S DISEASE OF LARGE INTESTINE WITH OTHER COMPLICATION: ICD-10-CM

## 2022-08-16 PROBLEM — E83.42 HYPOMAGNESEMIA: Status: RESOLVED | Noted: 2021-10-31 | Resolved: 2022-08-16

## 2022-08-16 PROBLEM — E87.6 HYPOKALEMIA: Status: RESOLVED | Noted: 2020-08-20 | Resolved: 2022-08-16

## 2022-08-16 PROBLEM — E83.42 HYPOMAGNESEMIA: Status: RESOLVED | Noted: 2020-07-14 | Resolved: 2022-08-16

## 2022-08-16 PROBLEM — E87.6 HYPOKALEMIA: Status: RESOLVED | Noted: 2021-10-31 | Resolved: 2022-08-16

## 2022-08-16 PROCEDURE — 99214 OFFICE O/P EST MOD 30 MIN: CPT | Performed by: FAMILY MEDICINE

## 2022-08-16 RX ORDER — OXYMETAZOLINE HYDROCHLORIDE 0.05 G/100ML
SPRAY NASAL
COMMUNITY
Start: 2022-08-11

## 2022-08-16 NOTE — PROGRESS NOTES
Subjective     Chief Complaint   Patient presents with   • Hypertension     6 mo f/u   • Anxiety     Compliance visit for Xanax, CSA obtained today, UDS order printed upfront for today       History of Present Illness     Ricardo Hugo is a 72-year-old male who presents today for a follow-up visit.    Blood pressure  The patient states he does not check his blood pressure at home. He states his blood pressure is normal around 130 mmHg systolic. He states he is tolerating his medications well.    The patient states he is still using the Xanax and it is doing what it is supposed to do. He states he takes it at 8:00 p.m. and he sleeps well.    Eustachian tube dysfunction  He states he just came here because his eustachian tube is blocked. He states he does not remember when his last echocardiogram was done. He is noted an echocardiogram in 07/2020 by Dr. Velasco.    The patient states he went to see a PA last week and he will go back to see him soon. He states he would like a referral to see Dr. Chen, Otolaryngology. He states he can lie on his right side for 5 seconds and his hearing clears up. He states when he sits up or stands up, it clogs up again.    Health maintenance  He states he gets the influenza vaccine every year.    He denies any problems with bowel or bladder.    Patient's PMR from outside medical facility reviewed and noted.    Review of Systems     Otherwise complete ROS reviewed and negative except as mentioned in the HPI.    Past Medical History:   Past Medical History:   Diagnosis Date   • 3-vessel CAD 8/11/2020   • Allergic rhinitis    • Anxiety disorder 4/27/2020   • Arthritis    • Asymmetrical sensorineural hearing loss 6/28/2017   • Atherosclerosis of native artery of both lower extremities with intermittent claudication (HCC) 7/18/2019   • Avascular necrosis of femoral head, left (HCC) 07/11/2020    right hip after surgery   • Carotid stenosis    • Chronic mucoid otitis media    •  Chronic rhinitis    • Coronary artery disease     HEART BYPASS 2004   • Crohn's disease of large intestine with other complication (Former[facility]) 7/30/2020    Chronic diarrhea Colonoscopy July 2020 revealed mild patchy scattered hemosiderin staining with inflammation more so in rectosigmoid area.  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   • Iron deficiency anemia 7/14/2020   • Mixed hearing loss of left ear    • PAD (peripheral artery disease) ([facility]) 8/11/2020   • Perianal abscess    • Pernicious anemia 08/17/2020    took shots but never diagnosed with b12 deficiency   • Personal history of alcoholism ([facility]) 08/11/2020    quit drinking in 2013   • Prostatic hypertrophy 8/11/2020   • Sensorineural hearing loss    • Sepsis with acute renal failure ([facility]) 9/15/2020   • Shortness of breath 5/27/2021   • Tinnitus    • Ventricular tachycardia, nonsustained ([facility]) 7/14/2020   • Weight loss 7/11/2020     Past Surgical History:  Past Surgical History:   Procedure Laterality Date   • ARTERY SURGERY  2021    right side on neck   • CAROTID ENDARTERECTOMY Right 5/10/2021    Procedure: RIGHT CAROTID ENDARTERECTOMY WITH EEG;  Surgeon: Gil Pineda DO;  Location: [facility] OR ;  Service: Vascular;  Laterality: Right;   • COLONOSCOPY N/A 7/2/2020    Procedure: COLONOSCOPY WITH ANESTHESIA;  Surgeon: Adrien Brewster MD;  Location: [facility] ENDOSCOPY;  Service: Gastroenterology;  Laterality: N/A;  pre op: diarrhea  post op: polyps  PCP: Joe Velasco MD   • COLONOSCOPY N/A 10/13/2020    Procedure: COLONOSCOPY WITH ANESTHESIA;  Surgeon: Adrien Brewster MD;  Location: [facility] ENDOSCOPY;   Service: Gastroenterology;  Laterality: N/A;  Pre: Chronic Diarrhea, Crohn's  Post: AVM  Dr. Neftali Velasco  CO2 Inflation Used   • CORONARY ARTERY BYPASS GRAFT  2003    x3   • ENDOSCOPY N/A 11/2/2021    Procedure: ESOPHAGOGASTRODUODENOSCOPY WITH ANESTHESIA;  Surgeon: Bridger Bell MD;  Location: [facility] ENDOSCOPY;  Service: Gastroenterology;  Laterality: N/A;  pre anemia;gi bleed  post  gi bleed;schatski ring  Dr. ERIC Velasco   • EYE SURGERY Bilateral     catorac   • INCISION AND DRAINAGE PERIRECTAL ABSCESS N/A 3/3/2017    Procedure: INCISION AND DRAINAGE OF JEET ANAL ABSCESS;  Surgeon: Lynette Smith MD;  Location: [facility] OR;  Service:    • MYRINGOTOMY W/ TUBES Left 04/17/2017    06/10/2016   • TONSILLECTOMY     • TOTAL HIP ARTHROPLASTY Right 2006     Social History:  reports that he quit smoking about 8 years ago. His smoking use included cigarettes. He started smoking about 34 years ago. He has a 12.50 pack-year smoking history. He has never used smokeless tobacco. He reports previous alcohol use. He reports that he does not use drugs.    Family History: family history includes No Known Problems in his daughter, father, and mother.      Allergies:  Allergies   Allergen Reactions   • Ondansetron Anaphylaxis and GI Intolerance   • Zofran [Ondansetron Hcl] Anaphylaxis   • Lortab [Hydrocodone-Acetaminophen] Other (See Comments) and Hallucinations     CLOSTROPHOBIC   • Allopurinol Other (See Comments)     Pain on right side     Medications:  Prior to Admission medications    Medication Sig Start Date End Date Taking? Authorizing Provider   albuterol sulfate  (90 Base) MCG/ACT inhaler USE 2 INHALATIONS FOUR TIMES A DAY AS NEEDED 7/12/21  Yes Tahira Mendez APRN   ALPRAZolam (XANAX) 0.25 MG tablet TAKE 1 TABLET BY MOUTH AT NIGHT AS NEEDED FOR ANXIETY 6/28/22  Yes Trisha Soni DO   amLODIPine (NORVASC) 10 MG tablet Take 1 tablet by mouth Daily. 7/25/22  Yes Trisha Soni DO   aspirin  (aspirin) 81 MG EC tablet Take  by mouth Daily.   Yes Twyla Guevara MD   atorvastatin (LIPITOR) 10 MG tablet TAKE 1 TABLET BY MOUTH DAILY 5/6/22  Yes Eleuterio Farley MD   azelastine (ASTELIN) 0.1 % nasal spray USE 1 SPRAY IN EACH NOSTRIL TWICE A DAY AS NEEDED 2/8/22  Yes Tahira Mendez APRN   cholestyramine (QUESTRAN) 4 g packet MIX AND DRINK 1 PACKET DAILY 5/23/22  Yes Adrien Brewster MD   cloNIDine (CATAPRES) 0.2 MG tablet TAKE 3 TABLETS DAILY 9/7/21  Yes Joe Velasco MD   clopidogrel (PLAVIX) 75 MG tablet TAKE 1 TABLET DAILY 7/12/22  Yes Elena Jon APRN   Copper Gluconate 2 MG tablet Take 2 mg by mouth Daily. 7/6/21  Yes Goldberg, Amit, MD   diphenhydrAMINE (BENADRYL) 25 mg capsule Take 25 mg by mouth Every 6 (Six) Hours As Needed for Itching.   Yes ProviderTwyla MD   fexofenadine (ALLEGRA) 180 MG tablet Take 180 mg by mouth Daily.   Yes ProviderTwyla MD   fluticasone (FLONASE) 50 MCG/ACT nasal spray 2 sprays into the nostril(s) as directed by provider Daily As Needed for Rhinitis.   Yes ProviderTwyla MD   hydrALAZINE (APRESOLINE) 25 MG tablet Take 1 tablet by mouth 3 (Three) Times a Day.  Patient taking differently: Take 100 mg by mouth 3 (Three) Times a Day. 4/18/22  Yes Trisha Soni DO   levothyroxine (Synthroid) 75 MCG tablet Take 1 tablet by mouth Daily. 4/29/22  Yes Ruthie Parra APRN   magnesium chloride ER 64 MG DR tablet Take 143 mg by mouth Daily.   Yes Twyla Guevara MD   Melatonin 10 MG capsule Take 2 capsules by mouth Every Night.   Yes ProviderTwyla MD   mesalamine (APRISO) 0.375 g 24 hr capsule TAKE 4 CAPSULES DAILY 1/31/22  Yes Arlen Temple APRN   metoprolol succinate XL (TOPROL-XL) 25 MG 24 hr tablet TAKE 1 TABLET DAILY 5/9/22  Yes Trisha Soni DO   montelukast (Singulair) 10 MG tablet Take 1 tablet by mouth Every Night. 8/2/22  Yes Maria L Zambrano APRN   Multiple Vitamins-Minerals  "(PRESERVISION/LUTEIN) capsule Take 1 capsule by mouth 2 (two) times a day.   Yes Twyla Guevara MD   omeprazole (priLOSEC) 20 MG capsule TAKE 1 CAPSULE DAILY 2/8/22  Yes Tahira Mendez APRN   Oxymetazoline HCl (Nasal Spray) 0.05 % solution 2 squirts to each nostril twice a day x10 days 8/11/22  Yes Twyla Guevara MD   potassium chloride 10 MEQ CR tablet TAKE 1 TABLET TWICE A DAY 11/29/21  Yes Tahira Mendez APRN   predniSONE (DELTASONE) 10 MG tablet Take 2 tablets by mouth Daily for 5 days, THEN 1 tablet Daily for 5 days. 8/8/22 8/18/22 Yes Maria L Zambrano APRN   sodium bicarbonate 650 MG tablet Take 0.5 tablets by mouth 2 (Two) Times a Day.  Patient taking differently: Take 325 mg by mouth 3 (Three) Times a Day. 11/3/21  Yes Dc Issa DO   valsartan (DIOVAN) 160 MG tablet Take 160 mg by mouth Daily.   Yes Twyla Guevara MD   vitamin D (ERGOCALCIFEROL) 1.25 MG (17456 UT) capsule capsule Take 1 capsule by mouth 1 (One) Time Per Week. 9/14/21  Yes Arlen Temple APRN       Objective     Vital Signs: /70 (BP Location: Left arm, Patient Position: Sitting, Cuff Size: Adult)   Pulse 68   Temp 97.1 °F (36.2 °C) (Temporal)   Ht 182.9 cm (72\")   Wt 76.2 kg (168 lb)   SpO2 99%   BMI 22.78 kg/m²   Physical Exam  Vitals and nursing note reviewed.   Constitutional:       Appearance: Normal appearance.   HENT:      Head: Normocephalic and atraumatic.      Right Ear: External ear normal.      Left Ear: External ear normal.      Nose: Nose normal.      Mouth/Throat:      Mouth: Mucous membranes are moist.   Eyes:      Extraocular Movements: Extraocular movements intact.      Conjunctiva/sclera: Conjunctivae normal.      Pupils: Pupils are equal, round, and reactive to light.   Cardiovascular:      Rate and Rhythm: Normal rate and regular rhythm.      Pulses: Normal pulses.      Heart sounds: No murmur heard.    No friction rub. No gallop.   Pulmonary:      " Effort: Pulmonary effort is normal.      Breath sounds: Normal breath sounds.   Abdominal:      General: Bowel sounds are normal.      Palpations: Abdomen is soft.   Musculoskeletal:         General: Normal range of motion.      Cervical back: Normal range of motion and neck supple.   Skin:     General: Skin is warm and dry.      Capillary Refill: Capillary refill takes less than 2 seconds.   Neurological:      General: No focal deficit present.      Mental Status: He is alert and oriented to person, place, and time.      Cranial Nerves: No cranial nerve deficit.   Psychiatric:         Mood and Affect: Mood normal.         Behavior: Behavior normal.         BMI is within normal parameters. No other follow-up for BMI required.      Results Reviewed:  Glucose   Date Value Ref Range Status   07/01/2022 121 (H) 65 - 99 mg/dL Final     BUN   Date Value Ref Range Status   07/01/2022 47 (H) 8 - 23 mg/dL Final     Creatinine   Date Value Ref Range Status   07/01/2022 2.57 (H) 0.76 - 1.27 mg/dL Final   04/12/2021 3.10 (H) 0.60 - 1.30 mg/dL Final     Comment:     Serial Number: [id number]:  [id number]     Sodium   Date Value Ref Range Status   07/01/2022 139 136 - 145 mmol/L Final     Potassium   Date Value Ref Range Status   07/01/2022 4.4 3.5 - 5.2 mmol/L Final     Chloride   Date Value Ref Range Status   07/01/2022 107 98 - 107 mmol/L Final     CO2   Date Value Ref Range Status   07/01/2022 20.0 (L) 22.0 - 29.0 mmol/L Final     Calcium   Date Value Ref Range Status   07/01/2022 8.6 8.6 - 10.5 mg/dL Final     ALT (SGPT)   Date Value Ref Range Status   07/01/2022 17 1 - 41 U/L Final     AST (SGOT)   Date Value Ref Range Status   07/01/2022 14 1 - 40 U/L Final     WBC   Date Value Ref Range Status   08/12/2022 8.24 3.40 - 10.80 10*3/mm3 Final   03/11/2021 5.06 3.40 - 10.80 10*3/mm3 Final     Hematocrit   Date Value Ref Range Status   08/12/2022 33.9 (L) 37.5 - 51.0 % Final     Platelets   Date Value Ref Range Status    08/12/2022 119 (L) 140 - 450 10*3/mm3 Final     Total Cholesterol   Date Value Ref Range Status   10/29/2021 59 0 - 200 mg/dL Final     Triglycerides   Date Value Ref Range Status   10/29/2021 97 0 - 150 mg/dL Final     HDL Cholesterol   Date Value Ref Range Status   10/29/2021 23 (L) 40 - 60 mg/dL Final     LDL Cholesterol    Date Value Ref Range Status   10/29/2021 17 0 - 100 mg/dL Final     LDL Chol Calc (NIH)   Date Value Ref Range Status   03/11/2021 66 0 - 100 mg/dL Final     LDL/HDL Ratio   Date Value Ref Range Status   10/29/2021 0.72  Final     Hemoglobin A1C   Date Value Ref Range Status   10/29/2021 4.80 4.80 - 5.60 % Final         Assessment / Plan     Assessment/Plan:  1. Hypertension, benign  Monitor blood pressure.  Goal less than 130/80.  Continue current medications.    2. Encounter for long-term (current) drug use    - Compliance Drug Analysis, Ur - Urine, Clean Catch    3. Murmur    - Adult Transthoracic Echo Complete w/ Color, Spectral and Contrast if necessary per protocol; Future    4. Dysfunction of both eustachian tubes    - Ambulatory Referral to ENT (Otolaryngology)    5. Crohn's disease of large intestine with other complication (HCC)  Continue current medicines.    6. CRD (chronic renal disease), stage IV (HCC)  Monitor CMP  Patient follows with  nephrology  6. Chronic renal disease.    Plan:  The patient is going to continue his current medications. He will monitor his blood pressure routinely. Usually it runs in the 130 mmHg range systolically per his recollection. He is going to continue his Xanax as he has previously been doing. It is working well for him. We will get an echocardiogram to reevaluate his murmur. It has been 2 years since his last echo. We will do a referral to ENT at [facility] since his physician at [facility] has left. He prefers to see Dr. Chen. We discussed the influenza vaccine. He will get this in late 09/2022 or 10/2022 when it is available. He will follow back  here in 6 months unless he has a problem.    Return in about 6 months (around 2/16/2023). unless patient needs to be seen sooner or acute issues arise.      I have discussed the patient results/orders and and plan/recommendation with them at today's visit.        Transcribed from ambient dictation for Trisha Soni DO by HEATHER CHERRY.  08/16/22   11:14 CDT    Patient verbalized consent to the visit recording.

## 2022-08-19 ENCOUNTER — TELEPHONE (OUTPATIENT)
Dept: INTERNAL MEDICINE | Facility: CLINIC | Age: 72
End: 2022-08-19

## 2022-08-19 ENCOUNTER — LAB (OUTPATIENT)
Dept: LAB | Facility: HOSPITAL | Age: 72
End: 2022-08-19

## 2022-08-19 DIAGNOSIS — N18.4 ANEMIA DUE TO STAGE 4 CHRONIC KIDNEY DISEASE: Primary | ICD-10-CM

## 2022-08-19 DIAGNOSIS — D63.1 ANEMIA DUE TO STAGE 4 CHRONIC KIDNEY DISEASE: Primary | ICD-10-CM

## 2022-08-19 LAB
DEPRECATED RDW RBC AUTO: 49.7 FL (ref 37–54)
ERYTHROCYTE [DISTWIDTH] IN BLOOD BY AUTOMATED COUNT: 13.9 % (ref 12.3–15.4)
HCT VFR BLD AUTO: 33.8 % (ref 37.5–51)
HGB BLD-MCNC: 11 G/DL (ref 13–17.7)
HOLD SPECIMEN: NORMAL
MCH RBC QN AUTO: 31.6 PG (ref 26.6–33)
MCHC RBC AUTO-ENTMCNC: 32.5 G/DL (ref 31.5–35.7)
MCV RBC AUTO: 97.1 FL (ref 79–97)
PLATELET # BLD AUTO: 134 10*3/MM3 (ref 140–450)
PMV BLD AUTO: 9.9 FL (ref 6–12)
RBC # BLD AUTO: 3.48 10*6/MM3 (ref 4.14–5.8)
WBC NRBC COR # BLD: 10.78 10*3/MM3 (ref 3.4–10.8)

## 2022-08-19 PROCEDURE — 36415 COLL VENOUS BLD VENIPUNCTURE: CPT

## 2022-08-19 PROCEDURE — 85027 COMPLETE CBC AUTOMATED: CPT

## 2022-08-19 NOTE — TELEPHONE ENCOUNTER
Caller: Ricardo Hugo    Relationship: Self    Best call back number: [phone]    What medication are you requesting: ANTIBIOTICS AND PREDNISONE     What are your current symptoms: EARS   STOPPED UP  HEAD CHEST CONGESTION     How long have you been experiencing symptoms: SINCE April HAS BEEN IN SEVERAL TIMES     Have you had these symptoms before:    [x] Yes  [] No    Have you been treated for these symptoms before:   [x] Yes  [] No    If a prescription is needed, what is your preferred pharmacy and phone number: [id number] - [place], [place] - [address] AT [facility] [address] - [phone] [facility] [phone] FX

## 2022-08-25 ENCOUNTER — OFFICE VISIT (OUTPATIENT)
Dept: ENT CLINIC | Age: 72
End: 2022-08-25
Payer: MEDICARE

## 2022-08-25 VITALS
WEIGHT: 170 LBS | DIASTOLIC BLOOD PRESSURE: 62 MMHG | BODY MASS INDEX: 23.03 KG/M2 | HEIGHT: 72 IN | SYSTOLIC BLOOD PRESSURE: 138 MMHG

## 2022-08-25 DIAGNOSIS — H72.92 TYMPANIC MEMBRANE PERFORATION, LEFT: ICD-10-CM

## 2022-08-25 DIAGNOSIS — H65.91 MIDDLE EAR EFFUSION, RIGHT: Primary | ICD-10-CM

## 2022-08-25 LAB — DRUGS UR: NORMAL

## 2022-08-25 PROCEDURE — G8427 DOCREV CUR MEDS BY ELIG CLIN: HCPCS | Performed by: PHYSICIAN ASSISTANT

## 2022-08-25 PROCEDURE — G8420 CALC BMI NORM PARAMETERS: HCPCS | Performed by: PHYSICIAN ASSISTANT

## 2022-08-25 PROCEDURE — 3017F COLORECTAL CA SCREEN DOC REV: CPT | Performed by: PHYSICIAN ASSISTANT

## 2022-08-25 PROCEDURE — 1123F ACP DISCUSS/DSCN MKR DOCD: CPT | Performed by: PHYSICIAN ASSISTANT

## 2022-08-25 PROCEDURE — 99213 OFFICE O/P EST LOW 20 MIN: CPT | Performed by: PHYSICIAN ASSISTANT

## 2022-08-25 PROCEDURE — 1036F TOBACCO NON-USER: CPT | Performed by: PHYSICIAN ASSISTANT

## 2022-08-25 NOTE — PROGRESS NOTES
Mr. Cole Franks is a pleasant 68-year-old  male that presents for a 2-week follow-up due to a middle ear effusion to the right ear. 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 After a trial of Afrin and Flonase, the patient reports that the fluid has remained unchanged. He denies any issues with fever, chills, or drainage from the right ear canal.  Continues with chronic otalgia with muffled hearing. Patient has a history of having myringotomy tubes in the past.      Physical examination with the microscope confirmed a persistent right middle ear effusion with poor mobility. Vivar was noted to localize to the right side. The left ear demonstrated approximately 80% central perforation that appears to be chronic with debris noted to the middle ear. No evidence of infection is grossly noted. Neck exam demonstrated no lymphadenopathy or thyromegaly. Impression: Persistent right-sided middle ear effusion with failed medical management    Plan: I have recommended referring the patient to Dr. Hina Palomino for possible myringotomy tube placement and eustachian tube dilatation. Patient is agreeable and wishes to proceed. I advised the patient to continue his Flonase and Afrin until he is seen by Dr. Hina Palomino. He was reminded to call if he has any questions or problems.       Electronically signed by Valeria Larkin PA-C on 8/25/22 at 10:56 AM CDT

## 2022-08-26 ENCOUNTER — OFFICE VISIT (OUTPATIENT)
Dept: ONCOLOGY | Facility: CLINIC | Age: 72
End: 2022-08-26

## 2022-08-26 ENCOUNTER — LAB (OUTPATIENT)
Dept: LAB | Facility: HOSPITAL | Age: 72
End: 2022-08-26

## 2022-08-26 VITALS
BODY MASS INDEX: 23.07 KG/M2 | HEART RATE: 48 BPM | WEIGHT: 170.3 LBS | SYSTOLIC BLOOD PRESSURE: 146 MMHG | RESPIRATION RATE: 16 BRPM | DIASTOLIC BLOOD PRESSURE: 62 MMHG | HEIGHT: 72 IN | TEMPERATURE: 97.5 F | OXYGEN SATURATION: 98 %

## 2022-08-26 DIAGNOSIS — C91.10 CLL (CHRONIC LYMPHOCYTIC LEUKEMIA): ICD-10-CM

## 2022-08-26 DIAGNOSIS — D63.1 ANEMIA DUE TO STAGE 4 CHRONIC KIDNEY DISEASE: Primary | ICD-10-CM

## 2022-08-26 DIAGNOSIS — N18.4 ANEMIA DUE TO STAGE 4 CHRONIC KIDNEY DISEASE: Primary | ICD-10-CM

## 2022-08-26 DIAGNOSIS — K70.30 ALCOHOLIC CIRRHOSIS, UNSPECIFIED WHETHER ASCITES PRESENT: ICD-10-CM

## 2022-08-26 LAB
ALBUMIN SERPL-MCNC: 3.8 G/DL (ref 3.5–5.2)
ALBUMIN/GLOB SERPL: 1.3 G/DL
ALP SERPL-CCNC: 135 U/L (ref 39–117)
ALT SERPL W P-5'-P-CCNC: 14 U/L (ref 1–41)
ANION GAP SERPL CALCULATED.3IONS-SCNC: 11 MMOL/L (ref 5–15)
AST SERPL-CCNC: 13 U/L (ref 1–40)
BILIRUB SERPL-MCNC: 0.4 MG/DL (ref 0–1.2)
BUN SERPL-MCNC: 53 MG/DL (ref 8–23)
BUN/CREAT SERPL: 21.6 (ref 7–25)
CALCIUM SPEC-SCNC: 8.6 MG/DL (ref 8.6–10.5)
CHLORIDE SERPL-SCNC: 105 MMOL/L (ref 98–107)
CO2 SERPL-SCNC: 20 MMOL/L (ref 22–29)
CREAT SERPL-MCNC: 2.45 MG/DL (ref 0.76–1.27)
DEPRECATED RDW RBC AUTO: 49 FL (ref 37–54)
EGFRCR SERPLBLD CKD-EPI 2021: 26.9 ML/MIN/1.73
ERYTHROCYTE [DISTWIDTH] IN BLOOD BY AUTOMATED COUNT: 13.8 % (ref 12.3–15.4)
GLOBULIN UR ELPH-MCNC: 2.9 GM/DL
GLUCOSE SERPL-MCNC: 98 MG/DL (ref 65–99)
HCT VFR BLD AUTO: 31 % (ref 37.5–51)
HGB BLD-MCNC: 10.1 G/DL (ref 13–17.7)
HOLD SPECIMEN: NORMAL
MCH RBC QN AUTO: 31.6 PG (ref 26.6–33)
MCHC RBC AUTO-ENTMCNC: 32.6 G/DL (ref 31.5–35.7)
MCV RBC AUTO: 96.9 FL (ref 79–97)
PLATELET # BLD AUTO: 105 10*3/MM3 (ref 140–450)
PMV BLD AUTO: 10.2 FL (ref 6–12)
POTASSIUM SERPL-SCNC: 4.8 MMOL/L (ref 3.5–5.2)
PROT SERPL-MCNC: 6.7 G/DL (ref 6–8.5)
RBC # BLD AUTO: 3.2 10*6/MM3 (ref 4.14–5.8)
SODIUM SERPL-SCNC: 136 MMOL/L (ref 136–145)
WBC NRBC COR # BLD: 8.53 10*3/MM3 (ref 3.4–10.8)

## 2022-08-26 PROCEDURE — 99214 OFFICE O/P EST MOD 30 MIN: CPT | Performed by: NURSE PRACTITIONER

## 2022-08-26 PROCEDURE — 80053 COMPREHEN METABOLIC PANEL: CPT

## 2022-08-26 PROCEDURE — 36415 COLL VENOUS BLD VENIPUNCTURE: CPT

## 2022-08-26 PROCEDURE — 85027 COMPLETE CBC AUTOMATED: CPT

## 2022-08-26 NOTE — PROGRESS NOTES
[facility] HEMATOLOGY & ONCOLOGY  [address]  [facility] [address] [phone]    Patient Name: Ricardo Hugo  Encounter Date: 08/26/2022  YOB: 1950   Patient Number: [id number]    Hematology / Oncology Progress Note      HPI / REASON FOR VISIT: Ricardo Hugo is a 72 y.o. male who is followed by this office for CLL, Iron Deficiency Anemia, Chronic Kidney Disease.  He sees Dr. Rueda for nephrology.      Staging form: Chronic Lymphocytic Leukemia / Small Lymphocytic Lymphoma, AJCC 8th Edition  - Clinical stage from 7/20/2021: Lymphocytosis: Absent, Adenopathy: Absent, Organomegaly: Absent, Anemia: Present, Thrombocytopenia: Absent .    He has received Injectafer for WALE and Epogen for anemia in CKD in the past.    He presents to clinic today for continued follow-up.  He has no acute complaints.  Patient had labs drawn and they were reviewed with him today.  BUN 53, creatinine 2.45, GFR 26, alk phos 135.  Previous anemia profile unremarkable.  CBC with WBC 8.53, hemoglobin 10.1, hematocrit 31.0, platelet count 105.    He has no acute complaint today.       LABS    Lab Results - Last 18 Months   Lab Units 08/26/22  0845 08/19/22  0858 08/12/22  0746 08/05/22  0726 07/29/22  0748 07/22/22  0726 07/08/22  0736 07/01/22  0812 04/29/22  1214 04/01/22  0753 01/10/22  0717 12/29/21  0938 12/22/21  0948 12/06/21  0815 12/01/21  0906 11/24/21  0851 11/18/21  1019 10/29/21  1614 10/29/21  0502 10/28/21  1750   HEMOGLOBIN g/dL 10.1* 11.0* 11.2* 10.5* 10.9* 10.8*   < > 9.7*   < > 10.1*   < > 10.1* 8.9*   < > 9.7* 9.7* 9.7*   < > 7.5* 8.0*   HEMATOCRIT % 31.0* 33.8* 33.9* 32.6* 33.3* 32.7*   < > 30.5*   < > 31.3*   < > 31.4* 29.7*   < > 32.2* 32.4* 30.6*   < > 22.6* 25.5*   MCV fL 96.9 97.1* 97.4* 98.5* 96.2 96.7   < > 97.1*   < > 95.7   < > 94.0 94.9   < > 96.4 98.5* 100.0*   < > 94.6 98.1*   WBC 10*3/mm3 8.53 10.78 8.24 10.29 7.63 8.04    < > 6.54   < > 5.40   < > 5.95 6.75   < > 5.76 6.42 5.46   < > 4.64 16.97*   RDW % 13.8 13.9 13.5 13.5 13.4 13.2   < > 13.6   < > 13.6   < > 18.3* 16.5*   < > 14.5 15.1 15.4   < > 15.3 16.4*   MPV fL 10.2 9.9 10.2 10.2 10.0 9.8   < > 10.4   < > 9.8   < > 10.0 9.7   < > 9.8 9.5 9.9   < > 9.7 9.4   PLATELETS 10*3/mm3 105* 134* 119* 136* 113* 135*   < > 112*   < > 116*   < > 154 155   < > 156 188 175   < > 151 348   IMM GRAN % %  --   --   --   --   --   --   --  0.5  --   --   --  0.8* 0.4  --  0.5 0.5 0.5   < >  --   --    NEUTROS ABS 10*3/mm3  --   --   --   --   --   --   --  4.14  --  3.76  --  3.54 4.56  --  3.63 3.72 3.13   < > 4.04 13.71*   LYMPHS ABS 10*3/mm3  --   --   --   --   --   --   --  1.51  --   --   --  1.44 1.35  --  1.18 1.54 1.34   < >  --   --    MONOS ABS 10*3/mm3  --   --   --   --   --   --   --  0.63  --   --   --  0.57 0.54  --  0.68 0.75 0.66   < >  --   --    EOS ABS 10*3/mm3  --   --   --   --   --   --   --  0.18  --  0.16  --  0.30 0.24  --  0.21 0.34 0.25   < >  --   --    BASOS ABS 10*3/mm3  --   --   --   --   --   --   --  0.05  --  0.22*  --  0.05 0.03  --  0.03 0.04 0.05   < >  --   --    IMMATURE GRANS (ABS) 10*3/mm3  --   --   --   --   --   --   --  0.03  --   --   --  0.05 0.03  --  0.03 0.03 0.03   < >  --   --    NRBC /100 WBC  --   --   --   --   --   --   --  0.0  --   --   --  0.0 0.0  --  0.0 0.0 0.0   < > 1.0*  --    NEUTROPHIL % %  --   --   --   --   --   --   --   --   --  69.7  --   --   --   --   --   --   --   --  74.0 56.6   MONOCYTES % %  --   --   --   --   --   --   --   --   --  11.1  --   --   --   --   --   --   --   --  7.0 6.1   BASOPHIL % %  --   --   --   --   --   --   --   --   --  4.0*  --   --   --   --   --   --   --   --   --   --    ATYP LYMPH % %  --   --   --   --   --   --   --   --   --  5.1*  --   --   --   --   --   --   --   --   --   --    ANISOCYTOSIS   --   --   --   --   --   --   --   --   --   --   --   --   --   --   --   --   --    --   --  Slight/1+    < > = values in this interval not displayed.       Lab Results - Last 18 Months   Lab Units 08/26/22  0845 07/01/22  0812 05/27/22  1222 04/29/22  1214 04/01/22  0753 02/15/22  1447 01/10/22  0717 11/11/21  1010 11/05/21  1113 11/03/21  0537 11/02/21  0600   GLUCOSE mg/dL 98 121* 114* 107* 100* 105* 133* 128* 109* 101* 96   SODIUM mmol/L 136 139 141 138 142 138 139 141 140 139 142   POTASSIUM mmol/L 4.8 4.4 4.4 4.9 4.9 4.7 4.8 4.4 4.6 3.9 3.5   CO2 mmol/L 20.0* 20.0* 21.0* 19.0* 22.0 17.0* 21.0* 17.0* 24.0 24.0 24.0   CHLORIDE mmol/L 105 107 105 107 107 105 108* 111* 109* 108* 108*   ANION GAP mmol/L 11.0 12.0 15.0 12.0 13.0 16.0* 10.0 13.0 7.0 7.0 10.0   CREATININE mg/dL 2.45* 2.57* 2.52* 2.36* 2.68* 2.72* 2.58* 2.66* 2.58* 2.38* 2.46*   BUN mg/dL 53* 47* 43* 68* 42* 56* 50* 43* 31* 21 27*   BUN / CREAT RATIO  21.6 18.3 17.1 28.8* 15.7 20.6 19.4 16.2 12.0 8.8 11.0   CALCIUM mg/dL 8.6 8.6 8.9 8.1* 9.1 8.8 8.8 8.5* 8.2* 8.0* 8.1*   EGFR IF NONAFRICN AM mL/min/1.73  --   --   --   --   --  23* 25* 24* 25* 27* 26*   ALK PHOS U/L 135* 156* 168* 153* 238* 183* 191* 197* 189* 132* 130*   TOTAL PROTEIN g/dL 6.7 7.0 7.4 6.6 6.6 7.1 7.3 6.5 5.7* 4.9* 5.3*   ALT (SGPT) U/L 14 17 18 35 21 20 19 17 16 11 9   AST (SGOT) U/L 13 14 18 17 18 18 20 22 28 24 20   BILIRUBIN mg/dL 0.4 0.3 0.2 0.3 0.2 0.3 0.3 0.2 <0.2 0.2 0.2   ALBUMIN g/dL 3.80 4.10 4.30 3.70 4.00 4.20 4.30 3.80 3.30* 2.50* 3.00*   GLOBULIN gm/dL 2.9 2.9 3.1 2.9 2.6 2.9 3.0 2.7 2.4 2.4 2.3       Lab Results - Last 18 Months   Lab Units 10/29/21  1201 10/28/21  1750 07/27/21  1245 07/20/21  1029 06/25/21  1155 03/11/21  0951   M-SPIKE g/dL  --   --   --   --  Not Observed  --    URIC ACID mg/dL 9.2*  --  9.5* 9.2*  --  8.7*   LDH U/L  --  166  --   --  181  --        Lab Results - Last 18 Months   Lab Units 07/01/22  0812 05/27/22  1222 04/29/22  1214 04/01/22  0753 03/15/22  1315 03/01/22  1308 11/03/21  0537 10/29/21  0502 07/06/21  1320  06/25/21  1155 06/21/21  1206 05/05/21  1014 03/11/21  0951   IRON mcg/dL 94 94 128 80 88 85   < >  --    < > 25*  --   --   --    TIBC mcg/dL 288* 298 241* 262* 258* 274*   < >  --    < > 420  --   --   --    IRON SATURATION % 33 32 53* 31 34 31   < >  --    < > 6*  --   --   --    FERRITIN ng/mL 254.20 281.30 298.80 320.90 295.30 201.30   < >  --    < > 31.91  --   --   --    TSH uIU/mL  --   --   --   --   --   --   --  0.898  --  3.150 4.170 6.550* 8.010*   FOLATE ng/mL  --   --   --   --   --   --   --   --   --  13.00  --   --   --     < > = values in this interval not displayed.         PAST MEDICAL HISTORY:  ALLERGIES:  Allergies   Allergen Reactions   • Ondansetron Anaphylaxis and GI Intolerance   • Zofran [Ondansetron Hcl] Anaphylaxis   • Lortab [Hydrocodone-Acetaminophen] Other (See Comments) and Hallucinations     CLOSTROPHOBIC   • Allopurinol Other (See Comments)     Pain on right side     CURRENT MEDICATIONS:  Outpatient Encounter Medications as of 8/26/2022   Medication Sig Dispense Refill   • albuterol sulfate  (90 Base) MCG/ACT inhaler USE 2 INHALATIONS FOUR TIMES A DAY AS NEEDED 20.1 g 3   • ALPRAZolam (XANAX) 0.25 MG tablet TAKE 1 TABLET BY MOUTH AT NIGHT AS NEEDED FOR ANXIETY 30 tablet 2   • amLODIPine (NORVASC) 10 MG tablet Take 1 tablet by mouth Daily. 90 tablet 3   • aspirin (aspirin) 81 MG EC tablet Take  by mouth Daily.     • atorvastatin (LIPITOR) 10 MG tablet TAKE 1 TABLET BY MOUTH DAILY 90 tablet 3   • azelastine (ASTELIN) 0.1 % nasal spray USE 1 SPRAY IN EACH NOSTRIL TWICE A DAY AS NEEDED 90 mL 1   • cholestyramine (QUESTRAN) 4 g packet MIX AND DRINK 1 PACKET DAILY 90 packet 1   • cloNIDine (CATAPRES) 0.2 MG tablet TAKE 3 TABLETS DAILY 270 tablet 3   • clopidogrel (PLAVIX) 75 MG tablet TAKE 1 TABLET DAILY 90 tablet 3   • Copper Gluconate 2 MG tablet Take 2 mg by mouth Daily. 30 tablet 6   • diphenhydrAMINE (BENADRYL) 25 mg capsule Take 25 mg by mouth Every 6 (Six) Hours As Needed  for Itching.     • fexofenadine (ALLEGRA) 180 MG tablet Take 180 mg by mouth Daily.     • fluticasone (FLONASE) 50 MCG/ACT nasal spray 2 sprays into the nostril(s) as directed by provider Daily As Needed for Rhinitis.     • hydrALAZINE (APRESOLINE) 25 MG tablet Take 1 tablet by mouth 3 (Three) Times a Day. (Patient taking differently: Take 100 mg by mouth 3 (Three) Times a Day. 100mg daily) 90 tablet 5   • levothyroxine (Synthroid) 75 MCG tablet Take 1 tablet by mouth Daily. 90 tablet 3   • magnesium chloride ER 64 MG DR tablet Take 143 mg by mouth Daily.     • Melatonin 10 MG capsule Take 2 capsules by mouth Every Night.     • mesalamine (APRISO) 0.375 g 24 hr capsule TAKE 4 CAPSULES DAILY 360 capsule 3   • metoprolol succinate XL (TOPROL-XL) 25 MG 24 hr tablet TAKE 1 TABLET DAILY 90 tablet 3   • montelukast (Singulair) 10 MG tablet Take 1 tablet by mouth Every Night. 30 tablet 3   • Multiple Vitamins-Minerals (PRESERVISION/LUTEIN) capsule Take 1 capsule by mouth 2 (two) times a day.     • omeprazole (priLOSEC) 20 MG capsule TAKE 1 CAPSULE DAILY 90 capsule 1   • Oxymetazoline HCl (Nasal Spray) 0.05 % solution 2 squirts to each nostril twice a day x10 days     • potassium chloride 10 MEQ CR tablet TAKE 1 TABLET TWICE A  tablet 3   • sodium bicarbonate 650 MG tablet Take 0.5 tablets by mouth 2 (Two) Times a Day. (Patient taking differently: Take 325 mg by mouth 3 (Three) Times a Day.) 180 tablet 3   • valsartan (DIOVAN) 160 MG tablet Take 160 mg by mouth Daily.     • vitamin D (ERGOCALCIFEROL) 1.25 MG (92794 UT) capsule capsule Take 1 capsule by mouth 1 (One) Time Per Week. 15 capsule 3     No facility-administered encounter medications on file as of 8/26/2022.     ADULT ILLNESSES:  Patient Active Problem List   Diagnosis Code   • Chronic rhinitis J31.0   • Acute renal failure superimposed on stage 4 chronic kidney disease (HCC) N17.9, N18.4   • Hyponatremia E87.1   • Acute cystitis N30.00   • Metabolic  acidosis, increased anion gap E87.2   • Moderate malnutrition ([facility]) E44.0   • Hypertension, benign I10   • Sepsis ([facility]) A41.9   • Enterocolitis K52.9   • Chronic diarrhea K52.9   • UTI (urinary tract infection), bacterial N39.0, A49.9   • Crohn's disease of large intestine with other complication ([facility]) K50.118   • Asymmetrical hearing loss of left ear [id number]   • Symptomatic anemia D64.9   • CKD (chronic kidney disease) stage 4, GFR 15-29 ml/min ([facility]) N18.4   • Bruit of right carotid artery R09.89   • Wellness examination Z00.00   • Pre-op evaluation Z01.818   • PAD (peripheral artery disease) ([facility]) I73.9   • IHD (ischemic heart disease) I25.9   • Carotid stenosis I65.29   • Stenosis of right carotid artery I65.21   • Preop testing Z01.818   • Carotid stenosis, right I65.21   • Avascular necrosis of bone of hip ([facility]) M87.059   • Diastolic dysfunction I51.89   • Shortness of breath R06.02   • CRD (chronic renal disease), stage IV ([facility]) N18.4   • Thrombocytopenia ([facility]) D69.6   • History of alcoholism ([facility]) F10.21   • Anemia due to chronic kidney disease N18.9, D63.1   • Copper deficiency E61.0   • Low iron  E61.1   • CLL (chronic lymphocytic leukemia) ([facility]) C91.10   • Pulmonary hypertension ([facility]) I27.20   • GI bleed K92.2     SURGERIES:  Past Surgical History:   Procedure Laterality Date   • ARTERY SURGERY  2021    right side on neck   • CAROTID ENDARTERECTOMY Right 5/10/2021    Procedure: RIGHT CAROTID ENDARTERECTOMY WITH EEG;  Surgeon: Gil Pineda DO;  Location: [facility] OR 12;  Service: Vascular;  Laterality: Right;   • COLONOSCOPY N/A 7/2/2020    Procedure: COLONOSCOPY WITH ANESTHESIA;  Surgeon: Adrien Brewster MD;  Location: [facility] ENDOSCOPY;  Service: Gastroenterology;  Laterality: N/A;  pre op: diarrhea  post op: polyps  PCP: Joe Velasco MD   • COLONOSCOPY N/A 10/13/2020    Procedure: COLONOSCOPY WITH ANESTHESIA;  Surgeon: Adrien Brewster MD;  Location: [facility] ENDOSCOPY;  Service:  Gastroenterology;  Laterality: N/A;  Pre: Chronic Diarrhea, Crohn's  Post: AVM  Dr. Neftali Velasco  CO2 Inflation Used   • CORONARY ARTERY BYPASS GRAFT  2003    x3   • ENDOSCOPY N/A 11/2/2021    Procedure: ESOPHAGOGASTRODUODENOSCOPY WITH ANESTHESIA;  Surgeon: Bridger Bell MD;  Location: [facility] ENDOSCOPY;  Service: Gastroenterology;  Laterality: N/A;  pre anemia;gi bleed  post  gi bleed;schatski ring  Dr. ERIC Velasco   • EYE SURGERY Bilateral     catorac   • INCISION AND DRAINAGE PERIRECTAL ABSCESS N/A 3/3/2017    Procedure: INCISION AND DRAINAGE OF JEET ANAL ABSCESS;  Surgeon: Lynette Smith MD;  Location: [facility] OR;  Service:    • MYRINGOTOMY W/ TUBES Left 04/17/2017    06/10/2016   • TONSILLECTOMY     • TOTAL HIP ARTHROPLASTY Right 2006     HEALTH MAINTENANCE ITEMS:  Health Maintenance Due   Topic Date Due   • Hepatitis B (1 of 3 - Risk 3-dose series) Never done   • TDAP/TD VACCINES (1 - Tdap) Never done   • Pneumococcal Vaccine 65+ (3 - PPSV23 or PCV20) 12/28/2015   • ANNUAL WELLNESS VISIT  03/17/2022   • COVID-19 Vaccine (5 - Booster for Moderna series) 07/30/2022       <no information>  Last Completed Colonoscopy          COLORECTAL CANCER SCREENING (COLONOSCOPY - Every 3 Years) Next due on 10/13/2023    10/28/2021  POC Occult Blood Stool    10/13/2020  Surgical Procedure: COLONOSCOPY    10/13/2020  COLONOSCOPY    07/02/2020  Surgical Procedure: COLONOSCOPY    07/02/2020  COLONOSCOPY    Only the first 5 history entries have been loaded, but more history exists.              Immunization History   Administered Date(s) Administered   • COVID-19 (MODERNA) 1st, 2nd, 3rd Dose Only 02/24/2021, 03/24/2021, 08/30/2021   • Flu Vaccine Split Quad 09/30/2019   • FluLaval/Fluzone >6mos 09/22/2020   • Fluzone High-Dose 65+yrs 10/12/2021   • Fluzone Quad >6mos (Multi-dose) 09/25/2018, 09/30/2019, 09/22/2020   • Influenza TIV (IM) 10/18/2017   • Influenza Whole 09/28/2015   • Pneumococcal Conjugate 13-Valent (PCV13)  2014   • Pneumococcal Polysaccharide (PPSV23) 2006   • Shingrix 2021, 2022   • Zostavax 2014     Last Completed Mammogram     This patient has no relevant Health Maintenance data.            FAMILY HISTORY:  Family History   Problem Relation Age of Onset   • No Known Problems Mother    • No Known Problems Father    • No Known Problems Daughter    • Colon cancer Neg Hx    • Colon polyps Neg Hx      SOCIAL HISTORY:  Social History     Socioeconomic History   • Marital status:    Tobacco Use   • Smoking status: Former Smoker     Packs/day: 0.50     Years: 25.00     Pack years: 12.50     Types: Cigarettes     Start date:      Quit date: 10/13/2013     Years since quittin.8   • Smokeless tobacco: Never Used   • Tobacco comment: quit    Vaping Use   • Vaping Use: Never used   Substance and Sexual Activity   • Alcohol use: Not Currently   • Drug use: No   • Sexual activity: Defer       REVIEW OF SYSTEMS:  Review of Systems   Constitutional: Negative for activity change, appetite change, fatigue, fever, unexpected weight gain and unexpected weight loss.   HENT: Negative for dental problem, facial swelling, swollen glands and trouble swallowing.    Eyes: Negative for double vision and discharge.   Respiratory: Negative for cough, shortness of breath and wheezing.    Cardiovascular: Negative for chest pain, palpitations and leg swelling.   Gastrointestinal: Negative for abdominal pain, blood in stool, nausea and vomiting.   Endocrine: Negative.    Genitourinary: Negative for dysuria and hematuria.   Musculoskeletal: Negative for arthralgias and myalgias.   Skin: Negative for rash, skin lesions and wound.   Allergic/Immunologic: Negative for immunocompromised state.   Neurological: Negative for speech difficulty, light-headedness, headache, memory problem and confusion.   Hematological: Negative for adenopathy.   Psychiatric/Behavioral: Negative for self-injury, suicidal ideas  "and depressed mood. The patient is not nervous/anxious.        /62   Pulse (!) 48   Temp 97.5 °F (36.4 °C) (Temporal)   Resp 16   Ht 182.9 cm (72\")   Wt 77.2 kg (170 lb 4.8 oz)   SpO2 98%   BMI 23.10 kg/m²  Body surface area is 1.99 meters squared.    Pain Score    08/26/22 0848   PainSc: 0-No pain       Physical Exam:  Physical Exam  Constitutional:       Appearance: Normal appearance.   HENT:      Head: Normocephalic and atraumatic.   Cardiovascular:      Rate and Rhythm: Normal rate and regular rhythm.   Pulmonary:      Effort: Pulmonary effort is normal.      Breath sounds: Normal breath sounds.   Abdominal:      General: Bowel sounds are normal.      Palpations: Abdomen is soft.   Musculoskeletal:      Right lower leg: No edema.      Left lower leg: No edema.   Skin:     General: Skin is warm and dry.   Neurological:      Mental Status: He is alert and oriented to person, place, and time.   Psychiatric:         Attention and Perception: Attention normal.         Mood and Affect: Mood normal.         Judgment: Judgment normal.         Ricardo Hugo reports a pain score of 0.  No intervention indicated         ASSESSMENT / PLAN    1. Anemia due to stage 4 chronic kidney disease (HCC)    2. CLL (chronic lymphocytic leukemia) (HCC)    3. Alcoholic cirrhosis, unspecified whether ascites present (HCC)         ASSESSMENT:      1.  CLL  - Clinical stage from 7/20/2021:  - Lymphocytosis: Absent, Adenopathy: Absent, Organomegaly: Absent,   Anemia: Present, Thrombocytopenia: Present  -Hgb 10.1, Plt count 105.  Likely secondary to CKD and Cirrhosis   PLAN:  Stable for observation    2.  Anemia in Chronic Kidney Disease   -Hemoglobin 10.1, Hematocrit 31  -BUN 53, Creatinine 2.45, GFR 26.9    -Serum iron 94, Ferritin 254, saturation 33%, TIBC 288  PLAN: Can start Retacrit  weekly for Hgb < 10 or Hct < 30 with goal of Hgb > 11 Or Hct > 33  -Sees Dr. Francis, Nephrology    3.  Iron Deficiency Anemia   -EGD on " 11/3/21 showed non-bleeding erosive gastropathy  --Serum iron 94, ferritin 254, saturation 33%, TIBC 288  -PLAN:  Continue to monitor.    4. Cirrhosis of Liver  -History of chronic alcoholism  - Cirrhotic morphology/appearance of the liver on 07/12/21 ultrasound  - Alk phos 135.  -PLAN:  Continue to monitor       PLAN:  -Biweekly CBC and Procrit injection will start for Hgb < 10 g or Hct < 30.  Then continue toq 2 weeks to keep hemoglobin greater than 11 or hematocrit greater than 33  -Continue current medications, treatment plans and follow up with PCP and any other specialist  Return to office in 3 months with CBC, CMP prior to appointment  Care discussed with patient.  Understanding expressed.  Patient agreeable with plan.        Becky Camacho, APRN  08/26/2022

## 2022-09-02 RX ORDER — POTASSIUM CHLORIDE 750 MG/1
10 TABLET, FILM COATED, EXTENDED RELEASE ORAL 2 TIMES DAILY
Qty: 180 TABLET | Refills: 3 | Status: SHIPPED | OUTPATIENT
Start: 2022-09-02 | End: 2023-02-16 | Stop reason: SDUPTHER

## 2022-09-02 RX ORDER — ERGOCALCIFEROL 1.25 MG/1
50000 CAPSULE ORAL WEEKLY
Qty: 15 CAPSULE | Refills: 3 | Status: SHIPPED | OUTPATIENT
Start: 2022-09-02 | End: 2023-02-13 | Stop reason: SDUPTHER

## 2022-09-02 RX ORDER — CLONIDINE HYDROCHLORIDE 0.2 MG/1
0.6 TABLET ORAL DAILY
Qty: 270 TABLET | Refills: 3 | Status: SHIPPED | OUTPATIENT
Start: 2022-09-02 | End: 2022-09-07 | Stop reason: SDUPTHER

## 2022-09-07 RX ORDER — FUROSEMIDE 20 MG/1
TABLET ORAL
Qty: 180 TABLET | Refills: 1 | OUTPATIENT
Start: 2022-09-07

## 2022-09-07 RX ORDER — CLONIDINE HYDROCHLORIDE 0.2 MG/1
0.6 TABLET ORAL DAILY
Qty: 270 TABLET | Refills: 1 | Status: SHIPPED | OUTPATIENT
Start: 2022-09-07 | End: 2022-12-30 | Stop reason: SDUPTHER

## 2022-09-08 ENCOUNTER — OFFICE VISIT (OUTPATIENT)
Dept: OTOLARYNGOLOGY | Facility: CLINIC | Age: 72
End: 2022-09-08

## 2022-09-08 ENCOUNTER — LAB (OUTPATIENT)
Dept: LAB | Facility: HOSPITAL | Age: 72
End: 2022-09-08

## 2022-09-08 VITALS
HEART RATE: 69 BPM | BODY MASS INDEX: 23.7 KG/M2 | TEMPERATURE: 98.4 F | WEIGHT: 175 LBS | OXYGEN SATURATION: 98 % | HEIGHT: 72 IN | SYSTOLIC BLOOD PRESSURE: 142 MMHG | DIASTOLIC BLOOD PRESSURE: 61 MMHG

## 2022-09-08 DIAGNOSIS — H90.A22 SENSORINEURAL HEARING LOSS (SNHL) OF LEFT EAR WITH RESTRICTED HEARING OF RIGHT EAR: ICD-10-CM

## 2022-09-08 DIAGNOSIS — N18.4 ANEMIA DUE TO STAGE 4 CHRONIC KIDNEY DISEASE: ICD-10-CM

## 2022-09-08 DIAGNOSIS — H69.82 EUSTACHIAN TUBE DYSFUNCTION, LEFT: Primary | ICD-10-CM

## 2022-09-08 DIAGNOSIS — D63.1 ANEMIA DUE TO STAGE 4 CHRONIC KIDNEY DISEASE: ICD-10-CM

## 2022-09-08 DIAGNOSIS — H72.92 PERFORATION OF LEFT TYMPANIC MEMBRANE: ICD-10-CM

## 2022-09-08 PROBLEM — H69.92 EUSTACHIAN TUBE DYSFUNCTION, LEFT: Status: ACTIVE | Noted: 2022-09-08

## 2022-09-08 LAB
BASOPHILS # BLD AUTO: 0.04 10*3/MM3 (ref 0–0.2)
BASOPHILS NFR BLD AUTO: 0.8 % (ref 0–1.5)
DEPRECATED RDW RBC AUTO: 48.6 FL (ref 37–54)
EOSINOPHIL # BLD AUTO: 0.13 10*3/MM3 (ref 0–0.4)
EOSINOPHIL NFR BLD AUTO: 2.7 % (ref 0.3–6.2)
ERYTHROCYTE [DISTWIDTH] IN BLOOD BY AUTOMATED COUNT: 13.9 % (ref 12.3–15.4)
HCT VFR BLD AUTO: 29.5 % (ref 37.5–51)
HGB BLD-MCNC: 9.6 G/DL (ref 13–17.7)
IMM GRANULOCYTES # BLD AUTO: 0.11 10*3/MM3 (ref 0–0.05)
IMM GRANULOCYTES NFR BLD AUTO: 2.3 % (ref 0–0.5)
LYMPHOCYTES # BLD AUTO: 0.97 10*3/MM3 (ref 0.7–3.1)
LYMPHOCYTES NFR BLD AUTO: 19.9 % (ref 19.6–45.3)
MCH RBC QN AUTO: 31.3 PG (ref 26.6–33)
MCHC RBC AUTO-ENTMCNC: 32.5 G/DL (ref 31.5–35.7)
MCV RBC AUTO: 96.1 FL (ref 79–97)
MONOCYTES # BLD AUTO: 0.6 10*3/MM3 (ref 0.1–0.9)
MONOCYTES NFR BLD AUTO: 12.3 % (ref 5–12)
NEUTROPHILS NFR BLD AUTO: 3.02 10*3/MM3 (ref 1.7–7)
NEUTROPHILS NFR BLD AUTO: 62 % (ref 42.7–76)
NRBC BLD AUTO-RTO: 0 /100 WBC (ref 0–0.2)
PLATELET # BLD AUTO: 147 10*3/MM3 (ref 140–450)
PMV BLD AUTO: 9.4 FL (ref 6–12)
RBC # BLD AUTO: 3.07 10*6/MM3 (ref 4.14–5.8)
WBC NRBC COR # BLD: 4.87 10*3/MM3 (ref 3.4–10.8)

## 2022-09-08 PROCEDURE — 69433 CREATE EARDRUM OPENING: CPT | Performed by: OTOLARYNGOLOGY

## 2022-09-08 PROCEDURE — 85025 COMPLETE CBC W/AUTO DIFF WBC: CPT

## 2022-09-08 PROCEDURE — 99203 OFFICE O/P NEW LOW 30 MIN: CPT | Performed by: OTOLARYNGOLOGY

## 2022-09-08 PROCEDURE — 36415 COLL VENOUS BLD VENIPUNCTURE: CPT

## 2022-09-08 RX ORDER — CIPROFLOXACIN AND DEXAMETHASONE 3; 1 MG/ML; MG/ML
3 SUSPENSION/ DROPS AURICULAR (OTIC) 2 TIMES DAILY
Qty: 7.5 ML | Refills: 0 | Status: SHIPPED | OUTPATIENT
Start: 2022-09-08 | End: 2022-09-15

## 2022-09-08 NOTE — TELEPHONE ENCOUNTER
Caller: Ricardo Hugo    Relationship: Self    Best call back number: [phone]     Requested Prescriptions: Furosemide 20 MG     COULD NOT LOCATE ON MEDICATION LIST    Pharmacy where request should be sent: [id number] - [place], [place] - [address] AT [facility] [address] - [phone] [facility] [phone] FX     Additional details provided by patient: TWO DAYS LEFT    Does the patient have less than a 3 day supply:  [x] Yes  [] No    Randi Pulliam Rep   09/08/22 12:53 CDT

## 2022-09-08 NOTE — PROGRESS NOTES
STERLING Orozco  [facility]  [address], [address]  [facility] [address]  Fax [phone]  Phone [phone]      Visit Type: NEW PATIENT   Chief Complaint   Patient presents with   • Ear Problem     Pt hx of ear infection, hearing loss of right ear since April         HPI  He complains of otalgia, ear fullness, ear pressure, chronic fluid on the ear, an eardrum perforation and a history of ear tube placement. The symptoms are localized to both ears. The patient has had mild to moderate symptoms. The symptoms have been relatively constant for the last several months. There have been no identified factors that aggravate the symptoms. The symptoms are improved by myringotomy tube insertion.    Patient reports since April he has had significant difficulty with his ears.  He has had numerous sets of myringotomy tubes, the last being approximately 3 years ago.  He has seen Dr. Chen in the past and wanted to reestablish with our group.    He has a large perforation of the left tympanic membrane, he reports minor intermittent drainage.    He is on plavix.    Past Medical History:   Diagnosis Date   • 3-vessel CAD 8/11/2020   • Allergic rhinitis    • Anxiety disorder 4/27/2020   • Arthritis    • Asymmetrical sensorineural hearing loss 6/28/2017   • Atherosclerosis of native artery of both lower extremities with intermittent claudication ([facility]) 7/18/2019   • Avascular necrosis of femoral head, left ([facility]) 07/11/2020    right hip after surgery   • Carotid stenosis    • Chronic mucoid otitis media    • Chronic rhinitis    • Coronary artery disease     HEART BYPASS 2004   • Crohn's disease of large intestine with other complication ([facility]) 7/30/2020    Chronic diarrhea Colonoscopy July 2020 revealed mild patchy scattered hemosiderin staining with inflammation more so in rectosigmoid area.  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   • Iron deficiency anemia 7/14/2020   • Mixed hearing loss of left ear    • PAD (peripheral artery disease) ([facility]) 8/11/2020   • Perianal abscess    • Pernicious anemia 08/17/2020    took shots but never diagnosed with b12 deficiency   • Personal history of alcoholism ([facility]) 08/11/2020    quit drinking in 2013   • Prostatic hypertrophy 8/11/2020   • Sensorineural hearing loss    • Sepsis with acute renal failure ([facility]) 9/15/2020   • Shortness of breath 5/27/2021   • Tinnitus    • Ventricular tachycardia, nonsustained ([facility]) 7/14/2020   • Weight loss 7/11/2020       Past Surgical History:   Procedure Laterality Date   • ARTERY SURGERY  2021    right side on neck   • CAROTID ENDARTERECTOMY Right 5/10/2021    Procedure: RIGHT CAROTID ENDARTERECTOMY WITH EEG;  Surgeon: Gil Pineda DO;  Location: [facility] OR ;  Service: Vascular;  Laterality: Right;   • COLONOSCOPY N/A 7/2/2020    Procedure: COLONOSCOPY WITH ANESTHESIA;  Surgeon: Adrien Brewster MD;  Location: [facility] ENDOSCOPY;  Service: Gastroenterology;  Laterality: N/A;  pre op: diarrhea  post op: polyps  PCP: Joe Velasco MD   • COLONOSCOPY N/A 10/13/2020    Procedure: COLONOSCOPY WITH ANESTHESIA;  Surgeon: Adrien Brewster MD;  Location: [facility] ENDOSCOPY;  Service: Gastroenterology;  Laterality: N/A;  Pre: Chronic Diarrhea, Crohn's  Post: AVM  Dr. Neftali Velasco  CO2 Inflation Used   • CORONARY ARTERY BYPASS GRAFT  2003    x3   • ENDOSCOPY N/A 11/2/2021    Procedure: ESOPHAGOGASTRODUODENOSCOPY WITH ANESTHESIA;  Surgeon: Bridger Bell MD;  Location: [facility] ENDOSCOPY;  Service: Gastroenterology;  Laterality:  N/A;  pre anemia;gi bleed  post  gi bleed;schatski ring  Dr. ERIC Velasco   • EYE SURGERY Bilateral     catorac   • INCISION AND DRAINAGE PERIRECTAL ABSCESS N/A 3/3/2017    Procedure: INCISION AND DRAINAGE OF JEET ANAL ABSCESS;  Surgeon: Lynette Smith MD;  Location: [facility];  Service:    • MYRINGOTOMY W/ TUBES Left 04/17/2017    06/10/2016   • TONSILLECTOMY     • TOTAL HIP ARTHROPLASTY Right 2006       Family History: His family history includes No Known Problems in his daughter, father, and mother.     Social History: He  reports that he quit smoking about 8 years ago. His smoking use included cigarettes. He started smoking about 34 years ago. He has a 12.50 pack-year smoking history. He has never used smokeless tobacco. He reports previous alcohol use. He reports that he does not use drugs.    Home Medications:  ALPRAZolam, Copper Gluconate, Melatonin, Nasal Spray, PreserVision/Lutein, albuterol sulfate HFA, amLODIPine, aspirin, atorvastatin, azelastine, cholestyramine, cloNIDine, clopidogrel, diphenhydrAMINE, fexofenadine, fluticasone, hydrALAZINE, levothyroxine, magnesium chloride ER, mesalamine, metoprolol succinate XL, montelukast, omeprazole, potassium chloride, sodium bicarbonate, valsartan, and vitamin D    Allergies:  He is allergic to ondansetron, zofran [ondansetron hcl], lortab [hydrocodone-acetaminophen], and allopurinol.       Vital Signs:   Temp:  [98.4 °F (36.9 °C)] 98.4 °F (36.9 °C)  Heart Rate:  [69] 69  BP: (142)/(61) 142/61  ENT Physical Exam  Constitutional  Appearance: patient appears well-developed, well-nourished and well-groomed,  Communication/Voice: communication appropriate for developmental age; vocal quality normal; Communication comments: hard of hearing  Head and Face  Appearance: head appears normal, face appears normal and face appears atraumatic;  Palpation: facial palpation normal;  Salivary: glands normal;  Ear  Hearing: impaired to conversational voice;  Auricles: bilateral  auricles normal;  Ear Canals: bilateral ear canals normal;  Tympanic Membranes: right tympanic membrane with effusion; left tympanic membrane perforated;perforation inferior; perforation size: 50%;  Nose  Internal Nose: nasal septal deviation present; bilateral inferior turbinates with hypertrophy;  Oral Cavity/Oropharynx  Lips: normal;  Teeth: normal;  Gums: gingiva normal;  Tongue: normal;  Oral mucosa: normal;  Hard palate: normal;  Neck  Neck: neck normal; neck palpation normal;  Respiratory  Inspection: breathing unlabored; normal breathing rate;       Result Review    RESULTS REVIEW    I have reviewed the patients old records in the chart.     Assessment & Plan    Diagnoses and all orders for this visit:    1. Eustachian tube dysfunction, left (Primary)    2. Perforation of left tympanic membrane  Comments:  After tube extrusion    3. Sensorineural hearing loss (SNHL) of left ear with restricted hearing of right ear            Medical and surgical options were discussed including continued medical management and myringotomy tube insertion. Risks, benefits and alternatives were discussed and questions were answered. After considering the options, the patient decided to proceed with myringotomy tube insertion.      Return in about 6 months (around 3/8/2023) for Follow up with STERLING Melendrez for tube follow up.      STERLING Orozco  09/08/22  14:47 CDT     Physician Attestation  I have seen and examined Ricardo Hugo and have reviewed the notes, assessments, and/or procedures and I concur with this documentation.    He had been a previous patient of mine up till 2017 with symptoms of left-sided eustachian tube dysfunction.  He had had improved symptoms with tube placement.  He then followed up over at [facility] and after tube extrusion they placed another tube on the left side.  It left a perforation on the left.  Other than some hearing loss, he is not having issues with the left side but has increased  fullness and decreased hearing on the right side.    On examination there is a perforation of the left inferior tympanic membrane back to the posterior aspect which is approximately 20 to 30%.  There is no drainage.  On the right side, there is dullness of the eardrum with clear effusion.    Myringotomy    Date/Time: 9/8/2022 3:14 PM  Performed by: Nathan Chen MD  Authorized by: Nathan Chen MD     Informed Consent:   Consent for the procedure was given by the patient. The risks and benefits of the procedure were discussed, including but not limited to tympanic membrane perforation, early or late tube extrusion, aural fullness, otorrhea and hearing loss/ tinnitus. Alternatives were discussed, including alternative treatments and observation. After the discussion of the risks and benefits, questions were allowed and answered until understanding was demonstrated. Consent to perform the procedure was obtained by written consent consent.     Anesthesia (see MAR for exact dosages):    Local anesthetic:     Total CC used: 0.1Local anesthetic: phenol.    Indications:  Right myringotomy tube insertion    Procedure:  The ear canal and tympanic membrane were visualized with the otologic microscope. A right myringotomy tube insertion was performed. After anesthesia, an incision was made in the right posterior inferior tympanic membrane. The middle ear was inspected and noted to have serous effusion. After suctioning, a beveled Austin modified tube was then placed in the myringotomy site. There was noted to be no difficulty placing the tube. The procedure was tolerated well with no immediate complications.         Follow-up 6 weeks      Electronically signed by Nathan Chen MD, 09/08/22, 3:13 PM CDT.

## 2022-09-08 NOTE — TELEPHONE ENCOUNTER
"LVM for pt to call back.  Furosemide was taken off his list on 7/1/22, by someone in another office, as \"therapy completed\", so will ask patient if this was an error, etc.  "

## 2022-09-09 RX ORDER — FUROSEMIDE 20 MG/1
20 TABLET ORAL 2 TIMES DAILY
Qty: 180 TABLET | Refills: 1 | Status: SHIPPED | OUTPATIENT
Start: 2022-09-09 | End: 2022-09-12 | Stop reason: SDUPTHER

## 2022-09-12 ENCOUNTER — TELEPHONE (OUTPATIENT)
Dept: OTOLARYNGOLOGY | Facility: CLINIC | Age: 72
End: 2022-09-12

## 2022-09-12 NOTE — TELEPHONE ENCOUNTER
Call placed to patient and informed him that there are no guarantees that ringing in ears will stop, patient verbalized understanding.

## 2022-09-13 ENCOUNTER — OFFICE VISIT (OUTPATIENT)
Dept: GASTROENTEROLOGY | Facility: CLINIC | Age: 72
End: 2022-09-13

## 2022-09-13 VITALS
WEIGHT: 166 LBS | DIASTOLIC BLOOD PRESSURE: 60 MMHG | TEMPERATURE: 97.1 F | OXYGEN SATURATION: 99 % | BODY MASS INDEX: 22.48 KG/M2 | HEIGHT: 72 IN | SYSTOLIC BLOOD PRESSURE: 168 MMHG | HEART RATE: 62 BPM

## 2022-09-13 DIAGNOSIS — K50.118 CROHN'S DISEASE OF LARGE INTESTINE WITH OTHER COMPLICATION: Primary | ICD-10-CM

## 2022-09-13 DIAGNOSIS — K52.9 CHRONIC DIARRHEA: ICD-10-CM

## 2022-09-13 PROCEDURE — 99213 OFFICE O/P EST LOW 20 MIN: CPT | Performed by: INTERNAL MEDICINE

## 2022-09-13 RX ORDER — CHOLESTYRAMINE 4 G/9G
1 POWDER, FOR SUSPENSION ORAL DAILY
Qty: 90 PACKET | Refills: 3 | Status: SHIPPED | OUTPATIENT
Start: 2022-09-13

## 2022-09-13 RX ORDER — LEVOTHYROXINE SODIUM 0.07 MG/1
75 TABLET ORAL DAILY
Qty: 90 TABLET | Refills: 1 | Status: SHIPPED | OUTPATIENT
Start: 2022-09-13 | End: 2022-12-30 | Stop reason: SDUPTHER

## 2022-09-13 RX ORDER — FUROSEMIDE 20 MG/1
20 TABLET ORAL 2 TIMES DAILY
Qty: 180 TABLET | Refills: 1 | Status: SHIPPED | OUTPATIENT
Start: 2022-09-13 | End: 2022-12-30 | Stop reason: SDUPTHER

## 2022-09-13 RX ORDER — MESALAMINE 0.38 G/1
1.5 CAPSULE, EXTENDED RELEASE ORAL DAILY
Qty: 360 CAPSULE | Refills: 3 | Status: SHIPPED | OUTPATIENT
Start: 2022-09-13

## 2022-09-13 NOTE — PROGRESS NOTES
"[facility]    Chief Complaint   Patient presents with   • Crohn's Disease       Subjective     HPI    Ricardo Hugo is a 72 y.o. male who presents with a chief complaint of Crohn's.    He comes in for an annual checkup.  He tells me he is doing quite well.  He states he is \"happy and healthy\".  Denies any GI symptoms.  He has 1 formed bowel movement proximately every other day.  He continues on cholestyramine.  He also continues on Apriso.  Denies any blood or mucus in his stools.  Denies melena.  He does have chronic anemia.  Is multifactorial.  He has known AVMs in his colon and upper gastrointestinal tract.  He last underwent a colonoscopy 2 years ago and upper endoscopy last October.  He does have chronic stage IV renal disease.  That is been stable.  He is getting hemoglobins checked routinely.  I see where he had it done on the eighth and his hemoglobin was 9.6 slightly lower than his baseline.  He has not received a transfusion in a year he tells me.      ============= copy of November 17, 2021 HPI===================================  =HISTORY OF PRESENT ILLNESS:      Ricardo Hugo is a 71 y.o. male presents for f/u melena. He was hospitalized 10/2021 and was seen in consult by Dr. Bell for melena and anemia. EGD was performed , see details below.  He has had no further melena. No sign of active GI bleeding. He takes omeprazole daily for several years.  He denies any unintentional weight loss or abdominal pain.  Appetite is good.        He has been on plavix and has restarted. He has history cad and vascular disease.  He has had carotid surgery by Dr. Pineda 6/2021. He sees Dr. Pineda this week for vascular test.        He has chronic anemia and is followed by Dr. Goldberg. Last labs 11-11-21 hgb 8.9.  He sees Dr. Goldberg once weekly. He gets iron infusions.            EGD 11-2-21 by Dr. Bell  - Benign-appearing esophageal ring.  - Z-line irregular.  - Small hiatal " hernia.  - Erythematous mucosa in the stomach.  - Non-bleeding erosive gastropathy.  - Two angioectasias in the duodenum. Treated with bipolar cautery.  Recommendations   - Return patient to [facility] for ongoing care.  - Clear liquid diet x 1, then advance as desired per primary inpatient care team..  - Avoid aspirin, ibuprofen, naproxen, or other non-steroidal anti-inflammatory drugs, anticoagulant/antiplatelet agents, ethanol as able clinically. 48 hours at minimum, up to 14 days if able clinically. Consider risk of thromboembolic phenomena as greater than re-bleeding risk, and  weigh risks vs benefits.  - Use a proton pump inhibitor PO BID.  - Observe patient's clinical course.  - Return to GI clinic in 2 weeks.  Please reconsult inpatient GI service prn.           Last colonoscopy 10-13-20   - The examined portion of the ileum was normal.  - A single non-bleeding colonic angioectasia.  - hemosiderin staining, minimum and improved from July's exam  - The examination was otherwise normal on direct and retroflexion views.  - Diverticulosis in the sigmoid colon.  - Biopsies were taken with a cold forceps from the ascending colon, transverse colon, 40cm, 20cm and  rectum for evaluation of microscopic colitis.  - - No visual evidence of active Crohn's           Crohn's disease  This seems to be stable. He has one bm daily. Takes apriso 4 tabs daily.      =========== copy of September 2021 HPI============================  =   HPI     Ricardo Hugo is a 71 y.o. male who presents with a chief complaint of Crohn's.     He has a history of chronic diarrhea but that is resolved.  He states he has a bowel movement daily or every other day.  He continues on cholestyramine 1 packet daily.  He also has a history of Crohn's disease.  This was first noted with mild inflammation in the rectosigmoid area.  [facility] lab suggested underlying Crohn's.  Follow-up colonoscopy last November showed no evidence of Crohn's.  It  was incomplete remission.  He is done quite well since that time.  His diarrhea is resolved.  He denies any abdominal discomfort.  Denies any blood in stools.  Denies melena or bright red blood per rectum.  Has had no change in bowel habits.  Weights been stable.     Of note, he did tell me he had to get a transfusion this past early summer.  He was told he may be losing blood in his stools.  He also scheduled to begin Procrit shots.  He does have a history of colonic AVMs and is on Plavix.  Denies any dyspepsia, nausea or abdominal discomfort.  He does take Aleve occasionally.  He takes Aleve PM to help him sleep.  Denies heartburn or dysphagia.        ============= copy of HPI March 2021==============================  HPI     Ricardo Hugo is a 71 y.o. male who presents with a chief complaint of Crohn's.        When he was here in November he was asymptomatic on Apriso.  We have talked about options.  He chose to stay with Apriso.  He also takes 1 packet of cholestyramine daily.     He tells me he is still doing well.  Denies diarrhea.  States he averages 1 bowel movement every other day.  Is formed.  Denies any bloody mucus.  No abdominal cramping.  Continues with Apriso and 1 packet of cholestyramine daily.  He did have labs done March 11.  I reviewed those with him.  His creatinine is 2.22 which is down a little bit.  Hemoglobin slightly higher at 9.7 than what it was in September at 8.5.  He does continue to follow with nephrology tells me.  He recently had an ultrasound Doppler of his neck and was found to have 70% stenosis in one of his carotids.  He is scheduled to see Dr. Pineda from vascular in a week.           ================================================ November 2020 HPI= 9=================  HPI     Ricardo PITER Bethel is a 70 y.o. male who presents with a chief complaint of Crohn's     He did undergo colonoscopy in October.  See his summary of the pathology and copy of the impression from the  colonoscopy below.  Overall it appeared improved.  There is no gross visual evidence of obvious Crohn's disease.  Random biopsies throughout the colon did show evidence of microscopic mild inflammation throughout with mild to moderate in the sigmoid colon.      Currently he feels well.  States he is having 1 formed brown bowel movement a day.  He takes Questran 1 packet daily.  He continues on Apriso 4 tablets daily.  Denies bloody mucus.  Denies cramps.  He has good appetite.  He is eating what he wants to.  He is active.  He has no complaints.  He does tell me he continues to follow-up with nephrology.  He has been told his renal function has been stable.  He is drinking his Gatorade once daily.  He has a follow-up appointment with renal after the holidays.     Colonoscopy October 13, 2020 impression  - The examined portion of the ileum was normal.  - A single non-bleeding colonic angioectasia.  - hemosiderin staining, minimum and improved from July's exam  - The examination was otherwise normal on direct and retroflexion views.  - Diverticulosis in the sigmoid colon.  - Biopsies were taken with a cold forceps from the ascending colon, transverse colon, 40cm, 20cm and  rectum for evaluation of microscopic colitis.  - - No visual evidence of active Crohn's     Pathology showed mild active inflammation on the random biopsies with mild to moderate in the sigmoid colon.        ======================== September 29, 2020 HPI================================  HPI     Ricardo Hugo is a 70 y.o. male who presents with a chief complaint of Crohn's and diarrhea.     He was in the hospital mid-September.  He was there with UTI and acute renal failure.  They attributed his acute renal failure to diarrhea.  But similar to when he was in the hospital in August he told me he was not having that much diarrhea.  When I saw him there in September he states he was maybe having 2-3 bowel movements at most.  He states they were  controlled with Questran but he was only taking it once a day.  Previously in August he was not taking the Apriso except for 1 tablet daily.  When we saw him in September he was taken the Apriso 4 tablets daily.  He was diagnosed with urinary tract infection and felt to be may be prostatitis.  He is seeing nephrology.     He comes in for follow-up visit.  He states he is having 1 bowel movement a day.  He describes this as soft mud.  There is no blood or mucus.  No nocturnal bowel movements.  No abdominal cramping.  He is eating well.  He finished his Cipro for his UTI type symptoms 2 days ago.  He saw his primary care provider earlier today who felt everything was stable.  He is due to see nephrology tomorrow.  He is now drinking 2 bottles of Gatorade a day to help with hydration.     He continues on Apriso 4 tablets daily and is taking Questran 1 packet twice a day.       Past Medical History:   Diagnosis Date   • 3-vessel CAD 8/11/2020   • Allergic rhinitis    • Anxiety disorder 4/27/2020   • Arthritis    • Asymmetrical sensorineural hearing loss 6/28/2017   • Atherosclerosis of native artery of both lower extremities with intermittent claudication ([facility]) 7/18/2019   • Avascular necrosis of femoral head, left ([facility]) 07/11/2020    right hip after surgery   • Carotid stenosis    • Chronic mucoid otitis media    • Chronic rhinitis    • Coronary artery disease     HEART BYPASS 2004   • Crohn's disease of large intestine with other complication ([facility]) 7/30/2020    Chronic diarrhea Colonoscopy July 2020 revealed mild patchy scattered hemosiderin staining with inflammation more so in rectosigmoid area.  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   • Iron deficiency anemia 7/14/2020   • Mixed hearing loss of left ear    • PAD (peripheral artery disease) ([facility]) 8/11/2020   • Perianal abscess    • Pernicious anemia 08/17/2020    took shots but never diagnosed with b12 deficiency   • Personal history of alcoholism ([facility]) 08/11/2020    quit drinking in 2013   • Prostatic hypertrophy 8/11/2020   • Sensorineural hearing loss    • Sepsis with acute renal failure ([facility]) 9/15/2020   • Shortness of breath 5/27/2021   • Tinnitus    • Ventricular tachycardia, nonsustained ([facility]) 7/14/2020   • Weight loss 7/11/2020       Past Surgical History:   Procedure Laterality Date   • ARTERY SURGERY  2021    right side on neck   • CAROTID ENDARTERECTOMY Right 5/10/2021    Procedure: RIGHT CAROTID ENDARTERECTOMY WITH EEG;  Surgeon: Gil Pineda DO;  Location: [facility] OR ;  Service: Vascular;  Laterality: Right;   • COLONOSCOPY N/A 7/2/2020    Procedure: COLONOSCOPY WITH ANESTHESIA;  Surgeon: Adrien Brewster MD;  Location: [facility] ENDOSCOPY;  Service: Gastroenterology;  Laterality: N/A;  pre op: diarrhea  post op: polyps  PCP: Joe Velasco MD   • COLONOSCOPY N/A 10/13/2020    Procedure: COLONOSCOPY WITH ANESTHESIA;  Surgeon: Adrien Brewtser MD;  Location: [facility] ENDOSCOPY;  Service: Gastroenterology;  Laterality: N/A;  Pre: Chronic Diarrhea, Crohn's  Post: AVM  Dr. Neftali Velasco  CO2 Inflation Used   • CORONARY ARTERY BYPASS GRAFT  2003    x3   • ENDOSCOPY N/A 11/2/2021    Procedure: ESOPHAGOGASTRODUODENOSCOPY WITH ANESTHESIA;  Surgeon: Bridger Bell MD;  Location: [facility] ENDOSCOPY;  Service: Gastroenterology;  Laterality: N/A;  pre anemia;gi bleed  post  gi bleed;schatski ring  Dr. ERIC Velasco   • EYE SURGERY Bilateral     catorac   • INCISION AND DRAINAGE PERIRECTAL ABSCESS N/A 3/3/2017    Procedure: INCISION AND DRAINAGE OF JEET ANAL ABSCESS;  Surgeon: Lynette Smith MD;  Location:   [facility] OR;  Service:    • MYRINGOTOMY W/ TUBES Left 04/17/2017    06/10/2016   • TONSILLECTOMY     • TOTAL HIP ARTHROPLASTY Right 2006         Current Outpatient Medications:   •  albuterol sulfate  (90 Base) MCG/ACT inhaler, USE 2 INHALATIONS FOUR TIMES A DAY AS NEEDED, Disp: 20.1 g, Rfl: 3  •  ALPRAZolam (XANAX) 0.25 MG tablet, TAKE 1 TABLET BY MOUTH AT NIGHT AS NEEDED FOR ANXIETY, Disp: 30 tablet, Rfl: 2  •  amLODIPine (NORVASC) 10 MG tablet, Take 1 tablet by mouth Daily., Disp: 90 tablet, Rfl: 3  •  aspirin (aspirin) 81 MG EC tablet, Take  by mouth Daily., Disp: , Rfl:   •  atorvastatin (LIPITOR) 10 MG tablet, TAKE 1 TABLET BY MOUTH DAILY, Disp: 90 tablet, Rfl: 3  •  azelastine (ASTELIN) 0.1 % nasal spray, USE 1 SPRAY IN EACH NOSTRIL TWICE A DAY AS NEEDED, Disp: 90 mL, Rfl: 1  •  cholestyramine (QUESTRAN) 4 g packet, Take 1 packet by mouth Daily., Disp: 90 packet, Rfl: 3  •  cloNIDine (CATAPRES) 0.2 MG tablet, Take 3 tablets by mouth Daily., Disp: 270 tablet, Rfl: 1  •  clopidogrel (PLAVIX) 75 MG tablet, TAKE 1 TABLET DAILY, Disp: 90 tablet, Rfl: 3  •  Copper Gluconate 2 MG tablet, Take 2 mg by mouth Daily., Disp: 30 tablet, Rfl: 6  •  diphenhydrAMINE (BENADRYL) 25 mg capsule, Take 25 mg by mouth Every 6 (Six) Hours As Needed for Itching., Disp: , Rfl:   •  fexofenadine (ALLEGRA) 180 MG tablet, Take 180 mg by mouth Daily., Disp: , Rfl:   •  fluticasone (FLONASE) 50 MCG/ACT nasal spray, 2 sprays into the nostril(s) as directed by provider Daily As Needed for Rhinitis., Disp: , Rfl:   •  furosemide (Lasix) 20 MG tablet, Take 1 tablet by mouth 2 (Two) Times a Day., Disp: 180 tablet, Rfl: 1  •  hydrALAZINE (APRESOLINE) 25 MG tablet, Take 1 tablet by mouth 3 (Three) Times a Day. (Patient taking differently: Take 100 mg by mouth 3 (Three) Times a Day. 100mg daily), Disp: 90 tablet, Rfl: 5  •  levothyroxine (Synthroid) 75 MCG tablet, Take 1 tablet by mouth Daily., Disp: 90 tablet, Rfl: 1  •  magnesium chloride ER  64 MG DR tablet, Take 143 mg by mouth Daily., Disp: , Rfl:   •  Melatonin 10 MG capsule, Take 2 capsules by mouth Every Night., Disp: , Rfl:   •  mesalamine (APRISO) 0.375 g 24 hr capsule, Take 4 capsules by mouth Daily., Disp: 360 capsule, Rfl: 3  •  metoprolol succinate XL (TOPROL-XL) 25 MG 24 hr tablet, TAKE 1 TABLET DAILY, Disp: 90 tablet, Rfl: 3  •  montelukast (Singulair) 10 MG tablet, Take 1 tablet by mouth Every Night., Disp: 30 tablet, Rfl: 3  •  Multiple Vitamins-Minerals (PRESERVISION/LUTEIN) capsule, Take 1 capsule by mouth 2 (two) times a day., Disp: , Rfl:   •  omeprazole (priLOSEC) 20 MG capsule, TAKE 1 CAPSULE DAILY, Disp: 90 capsule, Rfl: 1  •  Oxymetazoline HCl (Nasal Spray) 0.05 % solution, 2 squirts to each nostril twice a day x10 days, Disp: , Rfl:   •  potassium chloride 10 MEQ CR tablet, Take 1 tablet by mouth 2 (Two) Times a Day., Disp: 180 tablet, Rfl: 3  •  sodium bicarbonate 650 MG tablet, Take 0.5 tablets by mouth 2 (Two) Times a Day. (Patient taking differently: Take 325 mg by mouth 3 (Three) Times a Day.), Disp: 180 tablet, Rfl: 3  •  valsartan (DIOVAN) 160 MG tablet, Take 160 mg by mouth Daily., Disp: , Rfl:   •  vitamin D (ERGOCALCIFEROL) 1.25 MG (26393 UT) capsule capsule, Take 1 capsule by mouth 1 (One) Time Per Week., Disp: 15 capsule, Rfl: 3  •  ciprofloxacin-dexamethasone (CIPRODEX) 0.3-0.1 % otic suspension, Administer 3 drops into ear(s) as directed by provider 2 (Two) Times a Day for 7 days., Disp: 7.5 mL, Rfl: 0    Allergies   Allergen Reactions   • Ondansetron Anaphylaxis and GI Intolerance   • Zofran [Ondansetron Hcl] Anaphylaxis   • Lortab [Hydrocodone-Acetaminophen] Other (See Comments) and Hallucinations     CLOSTROPHOBIC   • Allopurinol Other (See Comments)     Pain on right side       Social History     Socioeconomic History   • Marital status:    Tobacco Use   • Smoking status: Former Smoker     Packs/day: 0.50     Years: 25.00     Pack years: 12.50     Types:  Cigarettes     Start date:      Quit date: 10/13/2013     Years since quittin.9   • Smokeless tobacco: Never Used   • Tobacco comment: quit 2013   Vaping Use   • Vaping Use: Never used   Substance and Sexual Activity   • Alcohol use: Not Currently   • Drug use: No   • Sexual activity: Defer       Family History   Problem Relation Age of Onset   • No Known Problems Mother    • No Known Problems Father    • No Known Problems Daughter    • Colon cancer Neg Hx    • Colon polyps Neg Hx        Review of Systems  No abdominal pain, appetite is good.  Weight is stable.  No diarrhea,    Objective     Vitals:    22 1326   BP: 168/60   Pulse: 62   Temp: 97.1 °F (36.2 °C)   SpO2: 99%       Physical Exam  No acute distress. Vital signs as documented.  Sclera anicteric.  Neck without noticeable JVD. Lungs clear to auscultation. Heart exam notable for regular rhythm, normal sounds. Abdomen is soft, nontender, non distended, normal bowel sounds and without evidence of organomegaly, masses.  Neuro alert, moves extremities.        Assessment & Plan   Problem List Items Addressed This Visit        Gastrointestinal Abdominal     Chronic diarrhea    Overview     Resolved         Crohn's disease of large intestine with other complication (HCC) - Primary    Overview     Chronic diarrhea Colonoscopy 2020 revealed mild patchy scattered hemosiderin staining with inflammation more so in rectosigmoid area.  [facility] lab IBD first step consistent with Crohn's.  Colonoscopy 2020 showed no active disease but pathology did show microscopic inflammation.  Asymptomatic on Apriso    T spot and hepatitis panel -2020                 His diarrhea is resolved.  He is asymptomatic regarding Crohn's he had mild inflammation in his rectosigmoid area.  He has been treated with 5-ASA products and cholestyramine and this is worked well for him.  Last colonoscopy in 2020 showed no active disease.  We discussed  possibly pursuing surveillance colon examination next fall.  We can reevaluate in 1 year.  Plan to continue him on the same medication.  I did caution him not to take any other medications at the same time as cholestyramine.  This should be least 2 hours apart.  See him back in the office in 1 year sooner if needed.  He is in agreement with this approach.    Continue ongoing management by primary care provider and other specialists.     Body mass index is 22.51 kg/m².  Stable BMI      EMR Dragon/transcription disclaimer:  Much of this encounter note is electronic transcription/translation of spoken language to printed text.  The electronic translation of spoken language may be erroneous, or at times, nonsensical words or phrases may be inadvertently transcribed.  Although I have reviewed the note for such errors, some may still exist.    Adrien Brewster MD  13:58 CDT  09/13/22

## 2022-09-23 ENCOUNTER — LAB (OUTPATIENT)
Dept: LAB | Facility: HOSPITAL | Age: 72
End: 2022-09-23

## 2022-09-23 DIAGNOSIS — D63.1 ANEMIA DUE TO STAGE 4 CHRONIC KIDNEY DISEASE: ICD-10-CM

## 2022-09-23 DIAGNOSIS — N18.4 ANEMIA DUE TO STAGE 4 CHRONIC KIDNEY DISEASE: ICD-10-CM

## 2022-09-23 DIAGNOSIS — I73.9 PAD (PERIPHERAL ARTERY DISEASE): ICD-10-CM

## 2022-09-23 LAB
BASOPHILS # BLD AUTO: 0.04 10*3/MM3 (ref 0–0.2)
BASOPHILS NFR BLD AUTO: 0.6 % (ref 0–1.5)
DEPRECATED RDW RBC AUTO: 49.7 FL (ref 37–54)
EOSINOPHIL # BLD AUTO: 0.18 10*3/MM3 (ref 0–0.4)
EOSINOPHIL NFR BLD AUTO: 2.7 % (ref 0.3–6.2)
ERYTHROCYTE [DISTWIDTH] IN BLOOD BY AUTOMATED COUNT: 14.1 % (ref 12.3–15.4)
HCT VFR BLD AUTO: 32.7 % (ref 37.5–51)
HGB BLD-MCNC: 10.5 G/DL (ref 13–17.7)
IMM GRANULOCYTES # BLD AUTO: 0.07 10*3/MM3 (ref 0–0.05)
IMM GRANULOCYTES NFR BLD AUTO: 1.1 % (ref 0–0.5)
LYMPHOCYTES # BLD AUTO: 1.61 10*3/MM3 (ref 0.7–3.1)
LYMPHOCYTES NFR BLD AUTO: 24.2 % (ref 19.6–45.3)
MCH RBC QN AUTO: 31.2 PG (ref 26.6–33)
MCHC RBC AUTO-ENTMCNC: 32.1 G/DL (ref 31.5–35.7)
MCV RBC AUTO: 97 FL (ref 79–97)
MONOCYTES # BLD AUTO: 0.77 10*3/MM3 (ref 0.1–0.9)
MONOCYTES NFR BLD AUTO: 11.6 % (ref 5–12)
NEUTROPHILS NFR BLD AUTO: 3.97 10*3/MM3 (ref 1.7–7)
NEUTROPHILS NFR BLD AUTO: 59.8 % (ref 42.7–76)
NRBC BLD AUTO-RTO: 0 /100 WBC (ref 0–0.2)
PLATELET # BLD AUTO: 126 10*3/MM3 (ref 140–450)
PMV BLD AUTO: 9.8 FL (ref 6–12)
RBC # BLD AUTO: 3.37 10*6/MM3 (ref 4.14–5.8)
WBC NRBC COR # BLD: 6.64 10*3/MM3 (ref 3.4–10.8)

## 2022-09-23 PROCEDURE — 36415 COLL VENOUS BLD VENIPUNCTURE: CPT

## 2022-09-23 PROCEDURE — 85025 COMPLETE CBC W/AUTO DIFF WBC: CPT

## 2022-09-23 RX ORDER — ATORVASTATIN CALCIUM 10 MG/1
10 TABLET, FILM COATED ORAL DAILY
Qty: 90 TABLET | Refills: 3 | Status: SHIPPED | OUTPATIENT
Start: 2022-09-23

## 2022-09-24 DIAGNOSIS — F41.9 ANXIETY: ICD-10-CM

## 2022-09-27 RX ORDER — ALPRAZOLAM 0.25 MG/1
0.25 TABLET ORAL NIGHTLY PRN
Qty: 30 TABLET | Refills: 2 | Status: SHIPPED | OUTPATIENT
Start: 2022-09-27 | End: 2022-12-27

## 2022-10-05 ENCOUNTER — HOSPITAL ENCOUNTER (OUTPATIENT)
Dept: CARDIOLOGY | Facility: HOSPITAL | Age: 72
Discharge: HOME | End: 2022-10-05
Admitting: FAMILY MEDICINE

## 2022-10-05 VITALS
BODY MASS INDEX: 22.48 KG/M2 | DIASTOLIC BLOOD PRESSURE: 60 MMHG | WEIGHT: 166 LBS | SYSTOLIC BLOOD PRESSURE: 168 MMHG | HEIGHT: 72 IN

## 2022-10-05 DIAGNOSIS — R01.1 MURMUR: ICD-10-CM

## 2022-10-05 LAB
BH CV ECHO MEAS - AO MAX PG: 15.1 MMHG
BH CV ECHO MEAS - AO MEAN PG: 7 MMHG
BH CV ECHO MEAS - AO V2 MAX: 194 CM/SEC
BH CV ECHO MEAS - AO V2 VTI: 44.6 CM
BH CV ECHO MEAS - EDV(CUBED): 126.5 ML
BH CV ECHO MEAS - EDV(MOD-SP2): 154 ML
BH CV ECHO MEAS - EDV(MOD-SP4): 159 ML
BH CV ECHO MEAS - EF(MOD-BP): 62.1 %
BH CV ECHO MEAS - EF(MOD-SP2): 61.4 %
BH CV ECHO MEAS - EF(MOD-SP4): 64.3 %
BH CV ECHO MEAS - ESV(CUBED): 40.4 ML
BH CV ECHO MEAS - ESV(MOD-SP2): 59.4 ML
BH CV ECHO MEAS - ESV(MOD-SP4): 56.7 ML
BH CV ECHO MEAS - FS: 31.7 %
BH CV ECHO MEAS - IVS/LVPW: 0.94 CM
BH CV ECHO MEAS - IVSD: 1.23 CM
BH CV ECHO MEAS - LAT PEAK E' VEL: 11.4 CM/SEC
BH CV ECHO MEAS - LV MASS(C)D: 254.8 GRAMS
BH CV ECHO MEAS - LV MAX PG: 10.4 MMHG
BH CV ECHO MEAS - LV MEAN PG: 6 MMHG
BH CV ECHO MEAS - LV V1 MAX: 161 CM/SEC
BH CV ECHO MEAS - LV V1 VTI: 43.1 CM
BH CV ECHO MEAS - LVIDD: 5 CM
BH CV ECHO MEAS - LVIDS: 3.4 CM
BH CV ECHO MEAS - LVPWD: 1.31 CM
BH CV ECHO MEAS - MED PEAK E' VEL: 6.3 CM/SEC
BH CV ECHO MEAS - MR MAX PG: 201.2 MMHG
BH CV ECHO MEAS - MR MAX VEL: 705.5 CM/SEC
BH CV ECHO MEAS - MV A MAX VEL: 67 CM/SEC
BH CV ECHO MEAS - MV DEC SLOPE: 405 CM/SEC2
BH CV ECHO MEAS - MV DEC TIME: 0.21 MSEC
BH CV ECHO MEAS - MV E MAX VEL: 99.1 CM/SEC
BH CV ECHO MEAS - MV E/A: 1.48
BH CV ECHO MEAS - MV MAX PG: 6.6 MMHG
BH CV ECHO MEAS - MV MEAN PG: 2 MMHG
BH CV ECHO MEAS - MV P1/2T: 85.3 MSEC
BH CV ECHO MEAS - MV V2 VTI: 54.1 CM
BH CV ECHO MEAS - MVA(P1/2T): 2.6 CM2
BH CV ECHO MEAS - PA V2 MAX: 155 CM/SEC
BH CV ECHO MEAS - RAP SYSTOLE: 5 MMHG
BH CV ECHO MEAS - RVSP: 44.9 MMHG
BH CV ECHO MEAS - SV(MOD-SP2): 94.6 ML
BH CV ECHO MEAS - SV(MOD-SP4): 102.3 ML
BH CV ECHO MEAS - TR MAX PG: 39.9 MMHG
BH CV ECHO MEAS - TR MAX VEL: 315.9 CM/SEC
BH CV ECHO MEASUREMENTS AVERAGE E/E' RATIO: 11.2
BH CV VAS BP LEFT ARM: NORMAL MMHG
BH CV XLRA - RV BASE: 4.7 CM
BH CV XLRA - TDI S': 14 CM/SEC
LEFT ATRIUM VOLUME: 93.5 ML
MAXIMAL PREDICTED HEART RATE: 146 BPM
STRESS TARGET HR: 124 BPM

## 2022-10-05 PROCEDURE — 93306 TTE W/DOPPLER COMPLETE: CPT

## 2022-10-05 PROCEDURE — 93306 TTE W/DOPPLER COMPLETE: CPT | Performed by: HOSPITALIST

## 2022-10-06 ENCOUNTER — LAB (OUTPATIENT)
Dept: LAB | Facility: HOSPITAL | Age: 72
End: 2022-10-06

## 2022-10-06 DIAGNOSIS — N18.4 ANEMIA DUE TO STAGE 4 CHRONIC KIDNEY DISEASE: ICD-10-CM

## 2022-10-06 DIAGNOSIS — D63.1 ANEMIA DUE TO STAGE 4 CHRONIC KIDNEY DISEASE: ICD-10-CM

## 2022-10-06 LAB
DEPRECATED RDW RBC AUTO: 49.1 FL (ref 37–54)
ERYTHROCYTE [DISTWIDTH] IN BLOOD BY AUTOMATED COUNT: 13.5 % (ref 12.3–15.4)
HCT VFR BLD AUTO: 32.8 % (ref 37.5–51)
HGB BLD-MCNC: 10.2 G/DL (ref 13–17.7)
MCH RBC QN AUTO: 30.8 PG (ref 26.6–33)
MCHC RBC AUTO-ENTMCNC: 31.1 G/DL (ref 31.5–35.7)
MCV RBC AUTO: 99.1 FL (ref 79–97)
PLATELET # BLD AUTO: 125 10*3/MM3 (ref 140–450)
PMV BLD AUTO: 9.8 FL (ref 6–12)
RBC # BLD AUTO: 3.31 10*6/MM3 (ref 4.14–5.8)
WBC NRBC COR # BLD: 5.24 10*3/MM3 (ref 3.4–10.8)

## 2022-10-06 PROCEDURE — 36415 COLL VENOUS BLD VENIPUNCTURE: CPT

## 2022-10-06 PROCEDURE — 85027 COMPLETE CBC AUTOMATED: CPT

## 2022-10-07 ENCOUNTER — APPOINTMENT (OUTPATIENT)
Dept: LAB | Facility: HOSPITAL | Age: 72
End: 2022-10-07

## 2022-10-21 ENCOUNTER — LAB (OUTPATIENT)
Dept: LAB | Facility: HOSPITAL | Age: 72
End: 2022-10-21

## 2022-10-21 DIAGNOSIS — D63.1 ANEMIA DUE TO STAGE 4 CHRONIC KIDNEY DISEASE: ICD-10-CM

## 2022-10-21 DIAGNOSIS — N18.4 ANEMIA DUE TO STAGE 4 CHRONIC KIDNEY DISEASE: ICD-10-CM

## 2022-10-21 LAB
DEPRECATED RDW RBC AUTO: 47.5 FL (ref 37–54)
ERYTHROCYTE [DISTWIDTH] IN BLOOD BY AUTOMATED COUNT: 13.4 % (ref 12.3–15.4)
HCT VFR BLD AUTO: 32.7 % (ref 37.5–51)
HGB BLD-MCNC: 10.7 G/DL (ref 13–17.7)
MCH RBC QN AUTO: 31.7 PG (ref 26.6–33)
MCHC RBC AUTO-ENTMCNC: 32.7 G/DL (ref 31.5–35.7)
MCV RBC AUTO: 96.7 FL (ref 79–97)
PLATELET # BLD AUTO: 137 10*3/MM3 (ref 140–450)
PMV BLD AUTO: 9.8 FL (ref 6–12)
RBC # BLD AUTO: 3.38 10*6/MM3 (ref 4.14–5.8)
WBC NRBC COR # BLD: 6.14 10*3/MM3 (ref 3.4–10.8)

## 2022-10-21 PROCEDURE — 85027 COMPLETE CBC AUTOMATED: CPT

## 2022-10-21 PROCEDURE — 36415 COLL VENOUS BLD VENIPUNCTURE: CPT

## 2022-11-04 ENCOUNTER — LAB (OUTPATIENT)
Dept: LAB | Facility: HOSPITAL | Age: 72
End: 2022-11-04

## 2022-11-04 DIAGNOSIS — N18.4 ANEMIA DUE TO STAGE 4 CHRONIC KIDNEY DISEASE: ICD-10-CM

## 2022-11-04 DIAGNOSIS — D63.1 ANEMIA DUE TO STAGE 4 CHRONIC KIDNEY DISEASE: ICD-10-CM

## 2022-11-04 LAB
DEPRECATED RDW RBC AUTO: 46.9 FL (ref 37–54)
ERYTHROCYTE [DISTWIDTH] IN BLOOD BY AUTOMATED COUNT: 13.1 % (ref 12.3–15.4)
HCT VFR BLD AUTO: 32.1 % (ref 37.5–51)
HGB BLD-MCNC: 10 G/DL (ref 13–17.7)
MCH RBC QN AUTO: 30.5 PG (ref 26.6–33)
MCHC RBC AUTO-ENTMCNC: 31.2 G/DL (ref 31.5–35.7)
MCV RBC AUTO: 97.9 FL (ref 79–97)
PLATELET # BLD AUTO: 128 10*3/MM3 (ref 140–450)
PMV BLD AUTO: 10.2 FL (ref 6–12)
RBC # BLD AUTO: 3.28 10*6/MM3 (ref 4.14–5.8)
WBC NRBC COR # BLD: 6.48 10*3/MM3 (ref 3.4–10.8)

## 2022-11-04 PROCEDURE — 36415 COLL VENOUS BLD VENIPUNCTURE: CPT

## 2022-11-04 PROCEDURE — 85027 COMPLETE CBC AUTOMATED: CPT

## 2022-11-08 ENCOUNTER — OFFICE VISIT (OUTPATIENT)
Dept: OTOLARYNGOLOGY | Facility: CLINIC | Age: 72
End: 2022-11-08

## 2022-11-08 VITALS — WEIGHT: 165.2 LBS | BODY MASS INDEX: 22.37 KG/M2 | TEMPERATURE: 98.2 F | HEIGHT: 72 IN

## 2022-11-08 DIAGNOSIS — H72.92 PERFORATION OF LEFT TYMPANIC MEMBRANE: ICD-10-CM

## 2022-11-08 DIAGNOSIS — H69.82 EUSTACHIAN TUBE DYSFUNCTION, LEFT: Primary | ICD-10-CM

## 2022-11-08 DIAGNOSIS — Z96.22 S/P BILATERAL MYRINGOTOMY WITH TUBE PLACEMENT: ICD-10-CM

## 2022-11-08 DIAGNOSIS — H90.A22 SENSORINEURAL HEARING LOSS (SNHL) OF LEFT EAR WITH RESTRICTED HEARING OF RIGHT EAR: ICD-10-CM

## 2022-11-08 PROCEDURE — 99212 OFFICE O/P EST SF 10 MIN: CPT | Performed by: EMERGENCY MEDICINE

## 2022-11-08 NOTE — PROGRESS NOTES
STERLING [facility]rozco [facility] EAR NOSE & THROAT  [address], [address]  [place] [address]  Fax [phone]  Phone [phone]      Visit Type: FOLLOW UP   Chief Complaint   Patient presents with   • Follow-up     Tubes        HPI  Ricardo Hugo is a 72 y.o. male who presents status post myringotomy tube insertion. The patient has had: no related complaints. The patient denies pain, fever, change of hearing and otorrhea.    Past Medical History:   Diagnosis Date   • 3-vessel CAD 8/11/2020   • Allergic rhinitis    • Anxiety disorder 4/27/2020   • Arthritis    • Asymmetrical sensorineural hearing loss 6/28/2017   • Atherosclerosis of native artery of both lower extremities with intermittent claudication ([facility]) 7/18/2019   • Avascular necrosis of femoral head, left ([facility]) 07/11/2020    right hip after surgery   • Carotid stenosis    • Chronic mucoid otitis media    • Chronic rhinitis    • Coronary artery disease     HEART BYPASS 2004   • Crohn's disease of large intestine with other complication ([facility]) 7/30/2020    Chronic diarrhea Colonoscopy July 2020 revealed mild patchy scattered hemosiderin staining with inflammation more so in rectosigmoid area.  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   • Iron deficiency anemia 7/14/2020   • Mixed hearing loss of left ear    • PAD (peripheral artery disease) ([facility]) 8/11/2020   • Perianal abscess    • Pernicious anemia 08/17/2020    took shots but never diagnosed with b12 deficiency   • Personal history of alcoholism ([facility]) 08/11/2020    quit drinking in 2013   •  Prostatic hypertrophy 8/11/2020   • Sensorineural hearing loss    • Sepsis with acute renal failure (HCC) 9/15/2020   • Shortness of breath 5/27/2021   • Tinnitus    • Ventricular tachycardia, nonsustained 7/14/2020   • Weight loss 7/11/2020       Past Surgical History:   Procedure Laterality Date   • ARTERY SURGERY  2021    right side on neck   • CAROTID ENDARTERECTOMY Right 5/10/2021    Procedure: RIGHT CAROTID ENDARTERECTOMY WITH EEG;  Surgeon: Gil Pineda DO;  Location: [facility] HYBRID OR 12;  Service: Vascular;  Laterality: Right;   • COLONOSCOPY N/A 7/2/2020    Procedure: COLONOSCOPY WITH ANESTHESIA;  Surgeon: Adrien Brewster MD;  Location: [facility] ENDOSCOPY;  Service: Gastroenterology;  Laterality: N/A;  pre op: diarrhea  post op: polyps  PCP: Joe Velasco MD   • COLONOSCOPY N/A 10/13/2020    Procedure: COLONOSCOPY WITH ANESTHESIA;  Surgeon: Adrien Brewster MD;  Location: [facility] ENDOSCOPY;  Service: Gastroenterology;  Laterality: N/A;  Pre: Chronic Diarrhea, Crohn's  Post: AVM  Dr. Neftali Velasco  CO2 Inflation Used   • CORONARY ARTERY BYPASS GRAFT  2003    x3   • ENDOSCOPY N/A 11/2/2021    Procedure: ESOPHAGOGASTRODUODENOSCOPY WITH ANESTHESIA;  Surgeon: Bridger Bell MD;  Location: [facility] ENDOSCOPY;  Service: Gastroenterology;  Laterality: N/A;  pre anemia;gi bleed  post  gi bleed;schatski ring  Dr. ERIC Velasco   • EYE SURGERY Bilateral     catorac   • INCISION AND DRAINAGE PERIRECTAL ABSCESS N/A 3/3/2017    Procedure: INCISION AND DRAINAGE OF JEET ANAL ABSCESS;  Surgeon: Lynette Smith MD;  Location: [facility] OR;  Service:    • MYRINGOTOMY W/ TUBES Left 04/17/2017    06/10/2016   • TONSILLECTOMY     • TOTAL HIP ARTHROPLASTY Right 2006       Family History: His family history includes No Known Problems in his daughter, father, and mother.     Social History: He  reports that he quit smoking about 9 years ago. His smoking use included cigarettes. He started smoking about 34 years ago. He has a  12.50 pack-year smoking history. He has never used smokeless tobacco. He reports that he does not currently use alcohol. He reports that he does not use drugs.    Home Medications:  ALPRAZolam, Copper Gluconate, Melatonin, Nasal Spray, PreserVision/Lutein, albuterol sulfate HFA, amLODIPine, aspirin, atorvastatin, azelastine, cholestyramine, cloNIDine, clopidogrel, diphenhydrAMINE, fexofenadine, fluticasone, furosemide, hydrALAZINE, levothyroxine, magnesium chloride ER, mesalamine, metoprolol succinate XL, montelukast, omeprazole, potassium chloride, sodium bicarbonate, valsartan, and vitamin D    Allergies:  He is allergic to ondansetron, zofran [ondansetron hcl], lortab [hydrocodone-acetaminophen], and allopurinol.       Vital Signs:   Temp:  [98.2 °F (36.8 °C)] 98.2 °F (36.8 °C)  ENT Physical Exam  Ear  Hearing: intact;  Auricles: bilateral auricles normal;  Ear Canals: bilateral ear canals normal;  Tympanic Membranes: right tympanic membrane tympanostomy tube noted; normal tube; left tympanic membrane perforated; central perforation inferior; perforation size: 40%;         Result Review    RESULTS REVIEW    I have reviewed the patients old records in the chart.     Assessment & Plan    Diagnoses and all orders for this visit:    1. Eustachian tube dysfunction, left (Primary)    2. Perforation of left tympanic membrane    3. Sensorineural hearing loss (SNHL) of left ear with restricted hearing of right ear    4. S/p bilateral myringotomy with tube placement           Return in about 6 months (around 5/8/2023) for Follow up with STERLING Melendrez for tube follow up.      STERLING Orozco  11/08/22  13:18 CST

## 2022-11-16 ENCOUNTER — TELEPHONE (OUTPATIENT)
Dept: VASCULAR SURGERY | Facility: CLINIC | Age: 72
End: 2022-11-16

## 2022-11-17 ENCOUNTER — OFFICE VISIT (OUTPATIENT)
Dept: VASCULAR SURGERY | Facility: CLINIC | Age: 72
End: 2022-11-17

## 2022-11-17 ENCOUNTER — HOSPITAL ENCOUNTER (OUTPATIENT)
Dept: ULTRASOUND IMAGING | Facility: HOSPITAL | Age: 72
Discharge: HOME | End: 2022-11-17

## 2022-11-17 VITALS
HEART RATE: 53 BPM | BODY MASS INDEX: 22.48 KG/M2 | HEIGHT: 72 IN | SYSTOLIC BLOOD PRESSURE: 132 MMHG | DIASTOLIC BLOOD PRESSURE: 68 MMHG | WEIGHT: 166 LBS | OXYGEN SATURATION: 97 %

## 2022-11-17 DIAGNOSIS — I73.9 PAD (PERIPHERAL ARTERY DISEASE): ICD-10-CM

## 2022-11-17 DIAGNOSIS — E78.2 MIXED HYPERLIPIDEMIA: ICD-10-CM

## 2022-11-17 DIAGNOSIS — I65.23 BILATERAL CAROTID ARTERY STENOSIS: ICD-10-CM

## 2022-11-17 DIAGNOSIS — I65.23 BILATERAL CAROTID ARTERY STENOSIS: Primary | ICD-10-CM

## 2022-11-17 DIAGNOSIS — I10 HYPERTENSION, BENIGN: ICD-10-CM

## 2022-11-17 PROCEDURE — 93923 UPR/LXTR ART STDY 3+ LVLS: CPT

## 2022-11-17 PROCEDURE — 99213 OFFICE O/P EST LOW 20 MIN: CPT | Performed by: SURGERY

## 2022-11-17 PROCEDURE — 93880 EXTRACRANIAL BILAT STUDY: CPT

## 2022-11-17 PROCEDURE — 93880 EXTRACRANIAL BILAT STUDY: CPT | Performed by: SURGERY

## 2022-11-17 PROCEDURE — 93923 UPR/LXTR ART STDY 3+ LVLS: CPT | Performed by: SURGERY

## 2022-11-17 NOTE — PROGRESS NOTES
"11/17/2022       Nathan Zimmer MD  [address] [place] [address]    Ricardo Hugo  1950    Chief Complaint   Patient presents with   • Follow-up     Patient here for 6 month follow up w/ carotid and [place]. Last seen 5/19/22. Denies any further issues.        Dear Nathan Zimmer MD       HPI  I had the pleasure of seeing your patient Ricardo Hugo in the office today.  As you recall, Ricardo Hugo is a 72 y.o.  male who we are following for lower extremity PAD and carotid occlusive disease.  He was previously having some claudication to his lower extremities however reports this has improved since beginning Plavix.  He was found to have significant carotid disease and underwent a right carotid endarterectomy on 5/10/2021.  Currently he is doing well and denies any strokelike symptoms.  He is maintained on aspirin, Plavix, and Lipitor.  He did have noninvasive testing performed today, which I did review in office.       Review of Systems   Constitutional: Negative.    HENT: Negative.    Eyes: Negative.    Respiratory: Negative.    Cardiovascular: Negative.    Gastrointestinal: Negative.    Endocrine: Negative.    Genitourinary: Negative.    Musculoskeletal: Negative.    Skin: Negative.    Allergic/Immunologic: Negative.    Neurological: Negative.    Hematological: Negative.    Psychiatric/Behavioral: Negative.    All other systems reviewed and are negative.        /68   Pulse 53   Ht 182.9 cm (72\")   Wt 75.3 kg (166 lb)   SpO2 97%   BMI 22.51 kg/m²    Physical Exam  Vitals and nursing note reviewed.   Constitutional:       Appearance: Normal appearance. He is well-developed.   HENT:      Head: Normocephalic and atraumatic.   Eyes:      General: No scleral icterus.     Pupils: Pupils are equal, round, and reactive to light.   Neck:      Thyroid: No thyromegaly.      Vascular: No carotid bruit or JVD.   Cardiovascular:      Rate and Rhythm: Normal rate and regular rhythm.     "  Pulses:           Carotid pulses are 2+ on the right side and 2+ on the left side.       Femoral pulses are 2+ on the right side and 2+ on the left side.       Dorsalis pedis pulses are detected w/ Doppler on the right side and detected w/ Doppler on the left side.        Posterior tibial pulses are detected w/ Doppler on the right side and detected w/ Doppler on the left side.      Heart sounds: Normal heart sounds.   Pulmonary:      Effort: Pulmonary effort is normal.      Breath sounds: Normal breath sounds.   Abdominal:      General: Bowel sounds are normal. There is no distension or abdominal bruit.      Palpations: Abdomen is soft. There is no mass.      Tenderness: There is no abdominal tenderness.   Musculoskeletal:         General: Normal range of motion.      Cervical back: Neck supple.   Lymphadenopathy:      Cervical: No cervical adenopathy.   Skin:     General: Skin is warm and dry.   Neurological:      General: No focal deficit present.      Mental Status: He is alert and oriented to person, place, and time.      Cranial Nerves: No cranial nerve deficit.      Sensory: No sensory deficit.   Psychiatric:         Mood and Affect: Mood normal.         Behavior: Behavior normal.         Thought Content: Thought content normal.         Judgment: Judgment normal.          Diagnostic data:  US Carotid Bilateral    Result Date: 11/17/2022  Narrative: History: Carotid occlusive disease      Impression: Impression: 1. There is less than 50% stenosis of the right internal carotid artery. 2. There is 50-69% stenosis of the left internal carotid artery. 3. Antegrade flow is demonstrated in bilateral vertebral arteries.  Comments: Bilateral carotid vertebral arterial duplex scan was performed.  Grayscale imaging shows intimal thickening and calcified elements at the carotid bifurcation. The right internal carotid artery peak systolic velocity is 122.8 cm/sec. The end-diastolic velocity is 19.9 cm/sec. The right  ICA/CCA ratio is approximately 1.3 . These findings correlate with less than 50% stenosis of the right internal carotid artery.  Grayscale imaging shows intimal thickening and calcified elements at the carotid bifurcation. The left internal carotid artery peak systolic velocity is 149.3 cm/sec. The end-diastolic velocity is 25.9 cm/sec. The left ICA/CCA ratio is approximately 1.3 . These findings correlate with 50-69% stenosis of the left internal carotid artery.  Antegrade flow is demonstrated in bilateral vertebral arteries. There is greater than 50% stenosis in bilateral external carotid arteries. This report was finalized on 11/17/2022 13:08 by Dr. Gil Pineda MD.    US Ankle / Brachial Indices Extremity Complete    Result Date: 11/17/2022  Narrative:  History: PAD  Comments: Bilateral lower extremity arterial with multi-level pulse volume recordings and segmental pressures were performed at rest and stress.  The right ankle/brachial index is 0.7. The waveforms are biphasic without dampening.These findings are consistent with moderate arterial insufficiency of the right lower extremity at rest.  The left ankle/brachial index is 0.56. The waveforms are biphasic without dampening. These findings are consistent with moderate arterial insufficiency of the left lower extremity at rest.      Impression: Impression: 1. Moderate arterial insufficiency of the right lower extremity at rest. 2. Moderate arterial insufficiency of the left lower extremity at rest.   This report was finalized on 11/17/2022 13:00 by Dr. Gil Pineda MD.       Patient Active Problem List   Diagnosis   • Chronic rhinitis   • Acute renal failure superimposed on stage 4 chronic kidney disease (HCC)   • Hyponatremia   • Acute cystitis   • Metabolic acidosis, increased anion gap   • Moderate malnutrition ([facility])   • Hypertension, benign   • Sepsis ([facility])   • Enterocolitis   • Chronic diarrhea   • UTI (urinary tract infection), bacterial   •  Crohn's disease of large intestine with other complication ([facility])   • Sensorineural hearing loss (SNHL) of left ear with restricted hearing of right ear   • Symptomatic anemia   • CKD (chronic kidney disease) stage 4, GFR 15-29 ml/min ([facility])   • Bruit of right carotid artery   • Wellness examination   • Pre-op evaluation   • PAD (peripheral artery disease) ([facility])   • IHD (ischemic heart disease)   • Carotid stenosis   • Stenosis of right carotid artery   • Preop testing   • Carotid stenosis, right   • Avascular necrosis of bone of hip ([facility])   • Diastolic dysfunction   • Shortness of breath   • CRD (chronic renal disease), stage IV ([facility])   • Thrombocytopenia ([facility])   • History of alcoholism ([facility])   • Anemia due to chronic kidney disease   • Copper deficiency   • Low iron    • CLL (chronic lymphocytic leukemia) ([facility])   • Pulmonary hypertension ([facility])   • GI bleed   • Perforation of left tympanic membrane   • Eustachian tube dysfunction, left   • Mixed hyperlipidemia         ICD-10-CM ICD-9-CM   1. Bilateral carotid artery stenosis  I65.23 433.10     433.30   2. Hypertension, benign  I10 401.1   3. PAD (peripheral artery disease) ([facility])  I73.9 443.9   4. Mixed hyperlipidemia  E78.2 272.2       Plan: After thoroughly evaluating Ricardo Hugo, I believe the best course of action is to remain conservative from vascular surgery standpoint.  Currently he is doing well and denies any strokelike symptoms.  He also says his legs are doing well and he can walk a pretty good distance before he starts to have any discomfort.  I did review his carotid testing which essentially remains unchanged.  The right side is widely patent.  His ABIs show moderate arterial insufficiency to his lower extremities which is unchanged as well.  I will see him back in 1 year with repeat noninvasive testing for continued surveillance, including a carotid duplex and ABIs.  Again if his claudication changes he could undergo angiogram but would need  hydration prior to the procedure given his kidney function.  I did discuss vascular risk factors as a pertains to the progression of vascular disease including controlling his hypertension and hyperlipidemia.  These risk factors are currently controlled and stable. The patient can continue taking their current medication regimen as previously planned.  This was all discussed in full with complete understanding.    Thank you for allowing me to participate in the care of your patient.  Please do not hesitate with any questions or concerns.  I will keep you aware of any further encounters with Ricardo Hugo.        Sincerely yours,         Gil Pineda, DO

## 2022-11-18 ENCOUNTER — LAB (OUTPATIENT)
Dept: LAB | Facility: HOSPITAL | Age: 72
End: 2022-11-18

## 2022-11-18 ENCOUNTER — OFFICE VISIT (OUTPATIENT)
Dept: ONCOLOGY | Facility: CLINIC | Age: 72
End: 2022-11-18

## 2022-11-18 ENCOUNTER — INFUSION (OUTPATIENT)
Dept: ONCOLOGY | Facility: HOSPITAL | Age: 72
End: 2022-11-18

## 2022-11-18 VITALS
WEIGHT: 148.2 LBS | OXYGEN SATURATION: 99 % | HEIGHT: 72 IN | SYSTOLIC BLOOD PRESSURE: 182 MMHG | RESPIRATION RATE: 16 BRPM | TEMPERATURE: 97.5 F | BODY MASS INDEX: 20.07 KG/M2 | DIASTOLIC BLOOD PRESSURE: 64 MMHG | HEART RATE: 66 BPM

## 2022-11-18 VITALS — TEMPERATURE: 97.7 F | SYSTOLIC BLOOD PRESSURE: 200 MMHG | DIASTOLIC BLOOD PRESSURE: 70 MMHG | HEART RATE: 57 BPM

## 2022-11-18 DIAGNOSIS — D63.1 ANEMIA DUE TO STAGE 4 CHRONIC KIDNEY DISEASE: Primary | ICD-10-CM

## 2022-11-18 DIAGNOSIS — N18.4 CKD (CHRONIC KIDNEY DISEASE) STAGE 4, GFR 15-29 ML/MIN: ICD-10-CM

## 2022-11-18 DIAGNOSIS — N18.4 ANEMIA DUE TO STAGE 4 CHRONIC KIDNEY DISEASE: Primary | ICD-10-CM

## 2022-11-18 DIAGNOSIS — K70.30 ALCOHOLIC CIRRHOSIS, UNSPECIFIED WHETHER ASCITES PRESENT: ICD-10-CM

## 2022-11-18 DIAGNOSIS — C91.10 CLL (CHRONIC LYMPHOCYTIC LEUKEMIA): ICD-10-CM

## 2022-11-18 DIAGNOSIS — D50.9 IRON DEFICIENCY ANEMIA, UNSPECIFIED IRON DEFICIENCY ANEMIA TYPE: ICD-10-CM

## 2022-11-18 LAB
ALBUMIN SERPL-MCNC: 4.3 G/DL (ref 3.5–5.2)
ALBUMIN/GLOB SERPL: 1.6 G/DL
ALP SERPL-CCNC: 172 U/L (ref 39–117)
ALT SERPL W P-5'-P-CCNC: 20 U/L (ref 1–41)
ANION GAP SERPL CALCULATED.3IONS-SCNC: 13 MMOL/L (ref 5–15)
AST SERPL-CCNC: 19 U/L (ref 1–40)
BASOPHILS # BLD AUTO: 0.04 10*3/MM3 (ref 0–0.2)
BASOPHILS NFR BLD AUTO: 0.7 % (ref 0–1.5)
BILIRUB SERPL-MCNC: 0.3 MG/DL (ref 0–1.2)
BUN SERPL-MCNC: 57 MG/DL (ref 8–23)
BUN/CREAT SERPL: 23.4 (ref 7–25)
CALCIUM SPEC-SCNC: 9.1 MG/DL (ref 8.6–10.5)
CHLORIDE SERPL-SCNC: 109 MMOL/L (ref 98–107)
CO2 SERPL-SCNC: 19 MMOL/L (ref 22–29)
CREAT SERPL-MCNC: 2.44 MG/DL (ref 0.76–1.27)
DEPRECATED RDW RBC AUTO: 48.4 FL (ref 37–54)
EGFRCR SERPLBLD CKD-EPI 2021: 27.1 ML/MIN/1.73
EOSINOPHIL # BLD AUTO: 0.13 10*3/MM3 (ref 0–0.4)
EOSINOPHIL NFR BLD AUTO: 2.3 % (ref 0.3–6.2)
ERYTHROCYTE [DISTWIDTH] IN BLOOD BY AUTOMATED COUNT: 13.3 % (ref 12.3–15.4)
FERRITIN SERPL-MCNC: 167.6 NG/ML (ref 30–400)
GLOBULIN UR ELPH-MCNC: 2.7 GM/DL
GLUCOSE SERPL-MCNC: 116 MG/DL (ref 65–99)
HCT VFR BLD AUTO: 31.7 % (ref 37.5–51)
HGB BLD-MCNC: 9.9 G/DL (ref 13–17.7)
HOLD SPECIMEN: NORMAL
IMM GRANULOCYTES # BLD AUTO: 0.02 10*3/MM3 (ref 0–0.05)
IMM GRANULOCYTES NFR BLD AUTO: 0.3 % (ref 0–0.5)
IRON 24H UR-MRATE: 56 MCG/DL (ref 59–158)
IRON SATN MFR SERPL: 20 % (ref 20–50)
LYMPHOCYTES # BLD AUTO: 1.21 10*3/MM3 (ref 0.7–3.1)
LYMPHOCYTES NFR BLD AUTO: 21.1 % (ref 19.6–45.3)
MCH RBC QN AUTO: 30.9 PG (ref 26.6–33)
MCHC RBC AUTO-ENTMCNC: 31.2 G/DL (ref 31.5–35.7)
MCV RBC AUTO: 99.1 FL (ref 79–97)
MONOCYTES # BLD AUTO: 0.68 10*3/MM3 (ref 0.1–0.9)
MONOCYTES NFR BLD AUTO: 11.8 % (ref 5–12)
NEUTROPHILS NFR BLD AUTO: 3.66 10*3/MM3 (ref 1.7–7)
NEUTROPHILS NFR BLD AUTO: 63.8 % (ref 42.7–76)
NRBC BLD AUTO-RTO: 0 /100 WBC (ref 0–0.2)
PLATELET # BLD AUTO: 111 10*3/MM3 (ref 140–450)
PMV BLD AUTO: 10.2 FL (ref 6–12)
POTASSIUM SERPL-SCNC: 4.5 MMOL/L (ref 3.5–5.2)
PROT SERPL-MCNC: 7 G/DL (ref 6–8.5)
RBC # BLD AUTO: 3.2 10*6/MM3 (ref 4.14–5.8)
SODIUM SERPL-SCNC: 141 MMOL/L (ref 136–145)
TIBC SERPL-MCNC: 277 MCG/DL (ref 298–536)
TRANSFERRIN SERPL-MCNC: 186 MG/DL (ref 200–360)
WBC NRBC COR # BLD: 5.74 10*3/MM3 (ref 3.4–10.8)

## 2022-11-18 PROCEDURE — 85025 COMPLETE CBC W/AUTO DIFF WBC: CPT

## 2022-11-18 PROCEDURE — 84466 ASSAY OF TRANSFERRIN: CPT

## 2022-11-18 PROCEDURE — 82728 ASSAY OF FERRITIN: CPT

## 2022-11-18 PROCEDURE — 83540 ASSAY OF IRON: CPT

## 2022-11-18 PROCEDURE — 80053 COMPREHEN METABOLIC PANEL: CPT

## 2022-11-18 PROCEDURE — 36415 COLL VENOUS BLD VENIPUNCTURE: CPT

## 2022-11-18 PROCEDURE — 25010000002 EPOETIN ALFA-EPBX 40000 UNIT/ML SOLUTION: Performed by: NURSE PRACTITIONER

## 2022-11-18 PROCEDURE — 99214 OFFICE O/P EST MOD 30 MIN: CPT | Performed by: NURSE PRACTITIONER

## 2022-11-18 PROCEDURE — 96372 THER/PROPH/DIAG INJ SC/IM: CPT

## 2022-11-18 RX ORDER — DIPHENHYDRAMINE HYDROCHLORIDE 50 MG/ML
50 INJECTION INTRAMUSCULAR; INTRAVENOUS AS NEEDED
Status: CANCELLED | OUTPATIENT
Start: 2022-12-01

## 2022-11-18 RX ORDER — SODIUM CHLORIDE 9 MG/ML
250 INJECTION, SOLUTION INTRAVENOUS ONCE
Status: CANCELLED | OUTPATIENT
Start: 2022-12-01

## 2022-11-18 RX ORDER — FAMOTIDINE 10 MG/ML
20 INJECTION, SOLUTION INTRAVENOUS AS NEEDED
Status: CANCELLED | OUTPATIENT
Start: 2022-12-01

## 2022-11-18 RX ADMIN — EPOETIN ALFA-EPBX 40000 UNITS: 40000 INJECTION, SOLUTION INTRAVENOUS; SUBCUTANEOUS at 10:47

## 2022-11-18 NOTE — PROGRESS NOTES
[facility] HEMATOLOGY & ONCOLOGY  [address]  [facility] [address] [phone]    Patient Name: Ricardo Hugo  Encounter Date: 11/18/2022  YOB: 1950   Patient Number: [id number]    Hematology / Oncology Progress Note      HPI / REASON FOR VISIT: Ricardo Hugo is a 72 y.o. male who is followed by this office for CLL, Iron Deficiency Anemia, Chronic Kidney Disease.  He sees Dr. Rueda for nephrology.      Staging form: Chronic Lymphocytic Leukemia / Small Lymphocytic Lymphoma, AJCC 8th Edition  - Clinical stage from 7/20/2021: Lymphocytosis: Absent, Adenopathy: Absent, Organomegaly: Absent, Anemia: Present, Thrombocytopenia: Absent .    He has received Injectafer for WALE and Epogen for anemia in CKD in the past.    He presents to clinic today for continued follow-up.  He has no acute complaints.  Patient had labs drawn and they were reviewed with him today.  BUN 57, creatinine 2.44, GFR 27.1, alk phos 172.  Anemia profile with serum iron 56, Ferritin 167, Sat 20%, TIBC 277.   CBC with WBC 5.74, hemoglobin 9.9, hematocrit 31.7, platelet count 111.    He has no acute complaint today.       LABS    Lab Results - Last 18 Months   Lab Units 11/18/22  0856 11/04/22  0728 10/21/22  0804 10/06/22  0751 09/23/22  0800 09/08/22  1333 07/08/22  0736 07/01/22  0812 04/29/22  1214 04/01/22  0753 01/10/22  0717 12/29/21  0938 12/22/21  0948 10/29/21  1614 10/29/21  0502 10/28/21  1750   HEMOGLOBIN g/dL 9.9* 10.0* 10.7* 10.2* 10.5* 9.6*   < > 9.7*   < > 10.1*   < > 10.1* 8.9*   < > 7.5* 8.0*   HEMATOCRIT % 31.7* 32.1* 32.7* 32.8* 32.7* 29.5*   < > 30.5*   < > 31.3*   < > 31.4* 29.7*   < > 22.6* 25.5*   MCV fL 99.1* 97.9* 96.7 99.1* 97.0 96.1   < > 97.1*   < > 95.7   < > 94.0 94.9   < > 94.6 98.1*   WBC 10*3/mm3 5.74 6.48 6.14 5.24 6.64 4.87   < > 6.54   < > 5.40   < > 5.95 6.75   < > 4.64 16.97*   RDW % 13.3 13.1 13.4 13.5 14.1 13.9   < >  13.6   < > 13.6   < > 18.3* 16.5*   < > 15.3 16.4*   MPV fL 10.2 10.2 9.8 9.8 9.8 9.4   < > 10.4   < > 9.8   < > 10.0 9.7   < > 9.7 9.4   PLATELETS 10*3/mm3 111* 128* 137* 125* 126* 147   < > 112*   < > 116*   < > 154 155   < > 151 348   IMM GRAN % % 0.3  --   --   --  1.1* 2.3*  --  0.5  --   --   --  0.8* 0.4   < >  --   --    NEUTROS ABS 10*3/mm3 3.66  --   --   --  3.97 3.02  --  4.14  --  3.76  --  3.54 4.56   < > 4.04 13.71*   LYMPHS ABS 10*3/mm3 1.21  --   --   --  1.61 0.97  --  1.51  --   --   --  1.44 1.35   < >  --   --    MONOS ABS 10*3/mm3 0.68  --   --   --  0.77 0.60  --  0.63  --   --   --  0.57 0.54   < >  --   --    EOS ABS 10*3/mm3 0.13  --   --   --  0.18 0.13  --  0.18  --  0.16  --  0.30 0.24   < >  --   --    BASOS ABS 10*3/mm3 0.04  --   --   --  0.04 0.04  --  0.05  --  0.22*  --  0.05 0.03   < >  --   --    IMMATURE GRANS (ABS) 10*3/mm3 0.02  --   --   --  0.07* 0.11*  --  0.03  --   --   --  0.05 0.03   < >  --   --    NRBC /100 WBC 0.0  --   --   --  0.0 0.0  --  0.0  --   --   --  0.0 0.0   < > 1.0*  --    NEUTROPHIL % %  --   --   --   --   --   --   --   --   --  69.7  --   --   --   --  74.0 56.6   MONOCYTES % %  --   --   --   --   --   --   --   --   --  11.1  --   --   --   --  7.0 6.1   BASOPHIL % %  --   --   --   --   --   --   --   --   --  4.0*  --   --   --   --   --   --    ATYP LYMPH % %  --   --   --   --   --   --   --   --   --  5.1*  --   --   --   --   --   --    ANISOCYTOSIS   --   --   --   --   --   --   --   --   --   --   --   --   --   --   --  Slight/1+    < > = values in this interval not displayed.       Lab Results - Last 18 Months   Lab Units 11/18/22  0856 08/26/22  0845 07/01/22  0812 05/27/22  1222 04/29/22  1214 04/01/22  0753 02/15/22  1447 01/10/22  0717 11/11/21  1010 11/05/21  1113 11/03/21  0537 11/02/21  0600   GLUCOSE mg/dL 116* 98 121* 114* 107* 100* 105* 133* 128* 109* 101* 96   SODIUM mmol/L 141 136 139 141 138 142 138 139 141 140 139 142    POTASSIUM mmol/L 4.5 4.8 4.4 4.4 4.9 4.9 4.7 4.8 4.4 4.6 3.9 3.5   CO2 mmol/L 19.0* 20.0* 20.0* 21.0* 19.0* 22.0 17.0* 21.0* 17.0* 24.0 24.0 24.0   CHLORIDE mmol/L 109* 105 107 105 107 107 105 108* 111* 109* 108* 108*   ANION GAP mmol/L 13.0 11.0 12.0 15.0 12.0 13.0 16.0* 10.0 13.0 7.0 7.0 10.0   CREATININE mg/dL 2.44* 2.45* 2.57* 2.52* 2.36* 2.68* 2.72* 2.58* 2.66* 2.58* 2.38* 2.46*   BUN mg/dL 57* 53* 47* 43* 68* 42* 56* 50* 43* 31* 21 27*   BUN / CREAT RATIO  23.4 21.6 18.3 17.1 28.8* 15.7 20.6 19.4 16.2 12.0 8.8 11.0   CALCIUM mg/dL 9.1 8.6 8.6 8.9 8.1* 9.1 8.8 8.8 8.5* 8.2* 8.0* 8.1*   EGFR IF NONAFRICN AM mL/min/1.73  --   --   --   --   --   --  23* 25* 24* 25* 27* 26*   ALK PHOS U/L 172* 135* 156* 168* 153* 238* 183* 191* 197* 189* 132* 130*   TOTAL PROTEIN g/dL 7.0 6.7 7.0 7.4 6.6 6.6 7.1 7.3 6.5 5.7* 4.9* 5.3*   ALT (SGPT) U/L 20 14 17 18 35 21 20 19 17 16 11 9   AST (SGOT) U/L 19 13 14 18 17 18 18 20 22 28 24 20   BILIRUBIN mg/dL 0.3 0.4 0.3 0.2 0.3 0.2 0.3 0.3 0.2 <0.2 0.2 0.2   ALBUMIN g/dL 4.30 3.80 4.10 4.30 3.70 4.00 4.20 4.30 3.80 3.30* 2.50* 3.00*   GLOBULIN gm/dL 2.7 2.9 2.9 3.1 2.9 2.6 2.9 3.0 2.7 2.4 2.4 2.3       Lab Results - Last 18 Months   Lab Units 10/29/21  1201 10/28/21  1750 07/27/21  1245 07/20/21  1029 06/25/21  1155   M-SPIKE g/dL  --   --   --   --  Not Observed   URIC ACID mg/dL 9.2*  --  9.5* 9.2*  --    LDH U/L  --  166  --   --  181       Lab Results - Last 18 Months   Lab Units 11/18/22  0856 07/01/22  0812 05/27/22  1222 04/29/22  1214 04/01/22  0753 03/15/22  1315 11/03/21  0537 10/29/21  0502 07/06/21  1320 06/25/21  1155 06/21/21  1206   IRON mcg/dL 56* 94 94 128 80 88   < >  --    < > 25*  --    TIBC mcg/dL 277* 288* 298 241* 262* 258*   < >  --    < > 420  --    IRON SATURATION % 20 33 32 53* 31 34   < >  --    < > 6*  --    FERRITIN ng/mL 167.60 254.20 281.30 298.80 320.90 295.30   < >  --    < > 31.91  --    TSH uIU/mL  --   --   --   --   --   --   --  0.898  --   3.150 4.170   FOLATE ng/mL  --   --   --   --   --   --   --   --   --  13.00  --     < > = values in this interval not displayed.         PAST MEDICAL HISTORY:  ALLERGIES:  Allergies   Allergen Reactions   • Ondansetron Anaphylaxis and GI Intolerance   • Zofran [Ondansetron Hcl] Anaphylaxis   • Lortab [Hydrocodone-Acetaminophen] Other (See Comments) and Hallucinations     CLOSTROPHOBIC   • Allopurinol Other (See Comments)     Pain on right side     CURRENT MEDICATIONS:  Outpatient Encounter Medications as of 11/18/2022   Medication Sig Dispense Refill   • albuterol sulfate  (90 Base) MCG/ACT inhaler USE 2 INHALATIONS FOUR TIMES A DAY AS NEEDED 20.1 g 3   • ALPRAZolam (XANAX) 0.25 MG tablet TAKE 1 TABLET BY MOUTH AT NIGHT AS NEEDED FOR ANXIETY 30 tablet 2   • amLODIPine (NORVASC) 10 MG tablet Take 1 tablet by mouth Daily. 90 tablet 3   • aspirin (aspirin) 81 MG EC tablet Take  by mouth Daily.     • atorvastatin (LIPITOR) 10 MG tablet Take 1 tablet by mouth Daily. 90 tablet 3   • azelastine (ASTELIN) 0.1 % nasal spray USE 1 SPRAY IN EACH NOSTRIL TWICE A DAY AS NEEDED 90 mL 1   • cholestyramine (QUESTRAN) 4 g packet Take 1 packet by mouth Daily. 90 packet 3   • cloNIDine (CATAPRES) 0.2 MG tablet Take 3 tablets by mouth Daily. 270 tablet 1   • clopidogrel (PLAVIX) 75 MG tablet TAKE 1 TABLET DAILY 90 tablet 3   • Copper Gluconate 2 MG tablet Take 2 mg by mouth Daily. 30 tablet 6   • diphenhydrAMINE (BENADRYL) 25 mg capsule Take 25 mg by mouth Every 6 (Six) Hours As Needed for Itching.     • fexofenadine (ALLEGRA) 180 MG tablet Take 180 mg by mouth Daily.     • fluticasone (FLONASE) 50 MCG/ACT nasal spray 2 sprays into the nostril(s) as directed by provider Daily As Needed for Rhinitis.     • furosemide (Lasix) 20 MG tablet Take 1 tablet by mouth 2 (Two) Times a Day. 180 tablet 1   • hydrALAZINE (APRESOLINE) 25 MG tablet Take 1 tablet by mouth 3 (Three) Times a Day. (Patient taking differently: Take 100 mg by  mouth 3 (Three) Times a Day. 100mg daily) 90 tablet 5   • levothyroxine (Synthroid) 75 MCG tablet Take 1 tablet by mouth Daily. 90 tablet 1   • magnesium chloride ER 64 MG DR tablet Take 143 mg by mouth Daily.     • Melatonin 10 MG capsule Take 2 capsules by mouth Every Night.     • mesalamine (APRISO) 0.375 g 24 hr capsule Take 4 capsules by mouth Daily. 360 capsule 3   • metoprolol succinate XL (TOPROL-XL) 25 MG 24 hr tablet TAKE 1 TABLET DAILY 90 tablet 3   • Multiple Vitamins-Minerals (PRESERVISION/LUTEIN) capsule Take 1 capsule by mouth 2 (two) times a day.     • omeprazole (priLOSEC) 20 MG capsule TAKE 1 CAPSULE DAILY 90 capsule 1   • Oxymetazoline HCl (Nasal Spray) 0.05 % solution 2 squirts to each nostril twice a day x10 days     • potassium chloride 10 MEQ CR tablet Take 1 tablet by mouth 2 (Two) Times a Day. 180 tablet 3   • sodium bicarbonate 650 MG tablet Take 0.5 tablets by mouth 2 (Two) Times a Day. (Patient taking differently: Take 325 mg by mouth 3 (Three) Times a Day.) 180 tablet 3   • valsartan (DIOVAN) 160 MG tablet Take 160 mg by mouth Daily.     • vitamin D (ERGOCALCIFEROL) 1.25 MG (16614 UT) capsule capsule Take 1 capsule by mouth 1 (One) Time Per Week. 15 capsule 3   • [DISCONTINUED] montelukast (Singulair) 10 MG tablet Take 1 tablet by mouth Every Night. 30 tablet 3     No facility-administered encounter medications on file as of 11/18/2022.     ADULT ILLNESSES:  Patient Active Problem List   Diagnosis Code   • Chronic rhinitis J31.0   • Acute renal failure superimposed on stage 4 chronic kidney disease (HCC) N17.9, N18.4   • Hyponatremia E87.1   • Acute cystitis N30.00   • Metabolic acidosis, increased anion gap E87.29   • Moderate malnutrition ([facility]) E44.0   • Hypertension, benign I10   • Sepsis ([facility]) A41.9   • Enterocolitis K52.9   • Chronic diarrhea K52.9   • UTI (urinary tract infection), bacterial N39.0, A49.9   • Crohn's disease of large intestine with other complication ([facility]) K50.118    • Sensorineural hearing loss (SNHL) of left ear with restricted hearing of right ear H90.A22   • Symptomatic anemia D64.9   • CKD (chronic kidney disease) stage 4, GFR 15-29 ml/min ([facility]) N18.4   • Bruit of right carotid artery R09.89   • Wellness examination Z00.00   • Pre-op evaluation Z01.818   • PAD (peripheral artery disease) ([facility]) I73.9   • IHD (ischemic heart disease) I25.9   • Carotid stenosis I65.29   • Stenosis of right carotid artery I65.21   • Preop testing Z01.818   • Carotid stenosis, right I65.21   • Avascular necrosis of bone of hip ([facility]) M87.059   • Diastolic dysfunction I51.89   • Shortness of breath R06.02   • CRD (chronic renal disease), stage IV ([facility]) N18.4   • Thrombocytopenia ([facility]) D69.6   • History of alcoholism ([facility]) F10.21   • Anemia due to chronic kidney disease N18.9, D63.1   • Copper deficiency E61.0   • Low iron  E61.1   • CLL (chronic lymphocytic leukemia) ([facility]) C91.10   • Pulmonary hypertension ([facility]) I27.20   • GI bleed K92.2   • Perforation of left tympanic membrane H72.92   • Eustachian tube dysfunction, left H69.82   • Mixed hyperlipidemia E78.2     SURGERIES:  Past Surgical History:   Procedure Laterality Date   • ARTERY SURGERY  2021    right side on neck   • CAROTID ENDARTERECTOMY Right 5/10/2021    Procedure: RIGHT CAROTID ENDARTERECTOMY WITH EEG;  Surgeon: Gil Pineda DO;  Location: [facility] OR ;  Service: Vascular;  Laterality: Right;   • COLONOSCOPY N/A 7/2/2020    Procedure: COLONOSCOPY WITH ANESTHESIA;  Surgeon: Adrien Brewster MD;  Location: [facility] ENDOSCOPY;  Service: Gastroenterology;  Laterality: N/A;  pre op: diarrhea  post op: polyps  PCP: Joe Velasco MD   • COLONOSCOPY N/A 10/13/2020    Procedure: COLONOSCOPY WITH ANESTHESIA;  Surgeon: Adrien Brewster MD;  Location: [facility] ENDOSCOPY;  Service: Gastroenterology;  Laterality: N/A;  Pre: Chronic Diarrhea, Crohn's  Post: AVM  Dr. Neftali Velasco  CO2 Inflation Used   • CORONARY ARTERY BYPASS GRAFT   2003    x3   • ENDOSCOPY N/A 11/2/2021    Procedure: ESOPHAGOGASTRODUODENOSCOPY WITH ANESTHESIA;  Surgeon: Bridger Bell MD;  Location: [facility] ENDOSCOPY;  Service: Gastroenterology;  Laterality: N/A;  pre anemia;gi bleed  post  gi bleed;schatski ring  Dr. ERIC Velasco   • EYE SURGERY Bilateral     catorac   • INCISION AND DRAINAGE PERIRECTAL ABSCESS N/A 3/3/2017    Procedure: INCISION AND DRAINAGE OF JEET ANAL ABSCESS;  Surgeon: Lynette Smith MD;  Location: [facility] OR;  Service:    • MYRINGOTOMY W/ TUBES Left 04/17/2017    06/10/2016   • TONSILLECTOMY     • TOTAL HIP ARTHROPLASTY Right 2006     HEALTH MAINTENANCE ITEMS:  Health Maintenance Due   Topic Date Due   • TDAP/TD VACCINES (1 - Tdap) Never done   • Hepatitis B (1 of 3 - Risk 3-dose series) Never done   • Pneumococcal Vaccine 65+ (3 - PPSV23 if available, else PCV20) 12/28/2015   • ANNUAL WELLNESS VISIT  03/17/2022   • LIPID PANEL  10/29/2022   • COVID-19 Vaccine (4 - Booster for Moderna series) 11/03/2022       <no information>  Last Completed Colonoscopy          COLORECTAL CANCER SCREENING (COLONOSCOPY - Every 3 Years) Next due on 10/13/2023    10/28/2021  POC Occult Blood Stool    10/13/2020  Surgical Procedure: COLONOSCOPY    10/13/2020  COLONOSCOPY    07/02/2020  Surgical Procedure: COLONOSCOPY    07/02/2020  COLONOSCOPY    Only the first 5 history entries have been loaded, but more history exists.              Immunization History   Administered Date(s) Administered   • COVID-19 (MODERNA) 1st, 2nd, 3rd Dose Only 02/24/2021, 03/24/2021, 08/30/2021   • Flu Vaccine Split Quad 09/30/2019   • FluLaval/Fluzone >6mos 09/22/2020   • Fluzone High-Dose 65+yrs 10/12/2021   • Fluzone Quad >6mos (Multi-dose) 09/25/2018, 09/30/2019, 09/22/2020   • Influenza TIV (IM) 10/18/2017   • Influenza Whole 09/28/2015   • Pneumococcal Conjugate 13-Valent (PCV13) 12/28/2014   • Pneumococcal Polysaccharide (PPSV23) 03/17/2006   • Shingrix 12/04/2021, 02/02/2022   •  Zostavax 2014     Last Completed Mammogram     This patient has no relevant Health Maintenance data.            FAMILY HISTORY:  Family History   Problem Relation Age of Onset   • No Known Problems Mother    • No Known Problems Father    • No Known Problems Daughter    • Colon cancer Neg Hx    • Colon polyps Neg Hx      SOCIAL HISTORY:  Social History     Socioeconomic History   • Marital status:    Tobacco Use   • Smoking status: Former     Packs/day: 0.50     Years: 25.00     Pack years: 12.50     Types: Cigarettes     Start date:      Quit date: 10/13/2013     Years since quittin.1   • Smokeless tobacco: Never   • Tobacco comments:     quit    Vaping Use   • Vaping Use: Never used   Substance and Sexual Activity   • Alcohol use: Not Currently   • Drug use: No   • Sexual activity: Defer       REVIEW OF SYSTEMS:  Review of Systems   Constitutional: Negative for activity change, appetite change, fatigue, fever, unexpected weight gain and unexpected weight loss.   HENT: Negative for dental problem, facial swelling, swollen glands and trouble swallowing.    Eyes: Negative for double vision and discharge.   Respiratory: Negative for cough, shortness of breath and wheezing.    Cardiovascular: Negative for chest pain, palpitations and leg swelling.   Gastrointestinal: Negative for abdominal pain, blood in stool, nausea and vomiting.   Endocrine: Negative.    Genitourinary: Negative for dysuria and hematuria.   Musculoskeletal: Negative for arthralgias and myalgias.   Skin: Negative for rash, skin lesions and wound.   Allergic/Immunologic: Negative for immunocompromised state.   Neurological: Negative for speech difficulty, light-headedness, headache, memory problem and confusion.   Hematological: Negative for adenopathy.   Psychiatric/Behavioral: Negative for self-injury, suicidal ideas and depressed mood. The patient is not nervous/anxious.        BP (!) 182/64   Pulse 66   Temp 97.5 °F (36.4  "°C)   Resp 16   Ht 182.9 cm (72\")   Wt 67.2 kg (148 lb 3.2 oz)   SpO2 99%   BMI 20.10 kg/m²  Body surface area is 1.88 meters squared.    Pain Score    11/18/22 0902   PainSc: 0-No pain       Physical Exam:  Physical Exam  Constitutional:       Appearance: Normal appearance.   HENT:      Head: Normocephalic and atraumatic.   Cardiovascular:      Rate and Rhythm: Normal rate and regular rhythm.   Pulmonary:      Effort: Pulmonary effort is normal.      Breath sounds: Normal breath sounds.   Abdominal:      General: Bowel sounds are normal.      Palpations: Abdomen is soft.   Musculoskeletal:      Right lower leg: No edema.      Left lower leg: No edema.   Skin:     General: Skin is warm and dry.   Neurological:      Mental Status: He is alert and oriented to person, place, and time.   Psychiatric:         Attention and Perception: Attention normal.         Mood and Affect: Mood normal.         Judgment: Judgment normal.         Ricardo Hugo reports a pain score of 0.  No intervention indicated         ASSESSMENT / PLAN    1. Anemia due to stage 4 chronic kidney disease ([facility])    2. CLL (chronic lymphocytic leukemia) ([facility])    3. CKD (chronic kidney disease) stage 4, GFR 15-29 ml/min ([facility])    4. Iron deficiency anemia, unspecified iron deficiency anemia type    5. Alcoholic cirrhosis, unspecified whether ascites present ([facility])         ASSESSMENT:      retacrit feb 17  injectafer dec 15    1.  CLL  - Clinical stage from 7/20/2021:  - Lymphocytosis: Absent, Adenopathy: Absent, Organomegaly: Absent,   Anemia: Present, Thrombocytopenia: Present  -WBC 5.74, Hgb 9.9, Plt count 111.    PLAN:  Stable for observation    2.  Anemia in Chronic Kidney Disease   -Hemoglobin 9.9, Hematocrit 31.7  -BUN 57, Creatinine 2.44, GFR 27.1  -Serum iron 56, Ferritin 167, saturation 20%, TIBC 277  Can start Retacrit   for Hgb < 10 or Hct < 30   Continue biweekly for Hgb < 11 Or HCT < 33 -Sees Dr. Francis, Nephrology    3.  Iron " Deficiency Anemia   -EGD on 11/3/21 showed non-bleeding erosive gastropathy  --Serum iron 56, Ferritin 167, saturation 20%, TIBC 277  - NEEDS ONE INJECTAFER INFUSION    -PLAN:  Recheck labs at next visit    4. Cirrhosis of Liver  -History of chronic alcoholism  - Cirrhotic morphology/appearance of the liver on 07/12/21 ultrasound  - Alk phos 172.  -PLAN:  Continue to monitor       PLAN:  -Biweekly CBC and Procrit injection will start for Hgb < 10 g or Hct < 30.  Then continue q 2 weeks to keep hemoglobin greater than 11 or hematocrit greater than 33  Pt will get one Injectafer Infusion   -Continue current medications, treatment plans and follow up with PCP and any other specialist  Return to office in 3 months with CBC, CMP prior to appointment  Care discussed with patient.  Understanding expressed.  Patient agreeable with plan.      Becky Camacho, APRN  11/18/2022

## 2022-11-22 ENCOUNTER — APPOINTMENT (OUTPATIENT)
Dept: ULTRASOUND IMAGING | Facility: HOSPITAL | Age: 72
End: 2022-11-22

## 2022-11-28 RX ORDER — MONTELUKAST SODIUM 10 MG/1
10 TABLET ORAL NIGHTLY
Qty: 30 TABLET | Refills: 11 | Status: SHIPPED | OUTPATIENT
Start: 2022-11-28

## 2022-11-29 RX ORDER — VALSARTAN 160 MG/1
160 TABLET ORAL DAILY
Qty: 90 TABLET | Refills: 3 | Status: SHIPPED | OUTPATIENT
Start: 2022-11-29

## 2022-12-01 ENCOUNTER — INFUSION (OUTPATIENT)
Dept: ONCOLOGY | Facility: HOSPITAL | Age: 72
End: 2022-12-01

## 2022-12-01 VITALS
HEART RATE: 49 BPM | BODY MASS INDEX: 22.89 KG/M2 | WEIGHT: 169 LBS | DIASTOLIC BLOOD PRESSURE: 61 MMHG | SYSTOLIC BLOOD PRESSURE: 144 MMHG | RESPIRATION RATE: 18 BRPM | HEIGHT: 72 IN | OXYGEN SATURATION: 96 % | TEMPERATURE: 98.1 F

## 2022-12-01 DIAGNOSIS — D63.1 ANEMIA DUE TO STAGE 4 CHRONIC KIDNEY DISEASE: ICD-10-CM

## 2022-12-01 DIAGNOSIS — N18.4 CRD (CHRONIC RENAL DISEASE), STAGE IV: Primary | ICD-10-CM

## 2022-12-01 DIAGNOSIS — N18.4 ANEMIA DUE TO STAGE 4 CHRONIC KIDNEY DISEASE: ICD-10-CM

## 2022-12-01 PROCEDURE — 25010000002 FERRIC CARBOXYMALTOSE 750 MG/15ML SOLUTION 15 ML VIAL: Performed by: NURSE PRACTITIONER

## 2022-12-01 PROCEDURE — 96365 THER/PROPH/DIAG IV INF INIT: CPT

## 2022-12-01 RX ORDER — SODIUM CHLORIDE 9 MG/ML
250 INJECTION, SOLUTION INTRAVENOUS ONCE
Status: COMPLETED | OUTPATIENT
Start: 2022-12-01 | End: 2022-12-01

## 2022-12-01 RX ORDER — DIPHENHYDRAMINE HYDROCHLORIDE 50 MG/ML
50 INJECTION INTRAMUSCULAR; INTRAVENOUS AS NEEDED
Status: DISCONTINUED | OUTPATIENT
Start: 2022-12-01 | End: 2022-12-01 | Stop reason: HOSPADM

## 2022-12-01 RX ORDER — FAMOTIDINE 10 MG/ML
20 INJECTION, SOLUTION INTRAVENOUS AS NEEDED
Status: DISCONTINUED | OUTPATIENT
Start: 2022-12-01 | End: 2022-12-01 | Stop reason: HOSPADM

## 2022-12-01 RX ADMIN — FERRIC CARBOXYMALTOSE INJECTION 750 MG: 50 INJECTION, SOLUTION INTRAVENOUS at 08:46

## 2022-12-01 RX ADMIN — SODIUM CHLORIDE 250 ML: 9 INJECTION, SOLUTION INTRAVENOUS at 08:46

## 2022-12-02 ENCOUNTER — LAB (OUTPATIENT)
Dept: LAB | Facility: HOSPITAL | Age: 72
End: 2022-12-02

## 2022-12-02 ENCOUNTER — INFUSION (OUTPATIENT)
Dept: ONCOLOGY | Facility: HOSPITAL | Age: 72
End: 2022-12-02

## 2022-12-02 VITALS
TEMPERATURE: 98.1 F | WEIGHT: 167.2 LBS | DIASTOLIC BLOOD PRESSURE: 43 MMHG | HEART RATE: 57 BPM | HEIGHT: 72 IN | SYSTOLIC BLOOD PRESSURE: 165 MMHG | OXYGEN SATURATION: 98 % | BODY MASS INDEX: 22.65 KG/M2 | RESPIRATION RATE: 16 BRPM

## 2022-12-02 DIAGNOSIS — N18.4 ANEMIA DUE TO STAGE 4 CHRONIC KIDNEY DISEASE: Primary | ICD-10-CM

## 2022-12-02 DIAGNOSIS — N18.4 ANEMIA DUE TO STAGE 4 CHRONIC KIDNEY DISEASE: ICD-10-CM

## 2022-12-02 DIAGNOSIS — D63.1 ANEMIA DUE TO STAGE 4 CHRONIC KIDNEY DISEASE: Primary | ICD-10-CM

## 2022-12-02 DIAGNOSIS — D63.1 ANEMIA DUE TO STAGE 4 CHRONIC KIDNEY DISEASE: ICD-10-CM

## 2022-12-02 LAB
BASOPHILS # BLD AUTO: 0.03 10*3/MM3 (ref 0–0.2)
BASOPHILS NFR BLD AUTO: 0.6 % (ref 0–1.5)
DEPRECATED RDW RBC AUTO: 51.4 FL (ref 37–54)
EOSINOPHIL # BLD AUTO: 0.17 10*3/MM3 (ref 0–0.4)
EOSINOPHIL NFR BLD AUTO: 3.7 % (ref 0.3–6.2)
ERYTHROCYTE [DISTWIDTH] IN BLOOD BY AUTOMATED COUNT: 14.3 % (ref 12.3–15.4)
HCT VFR BLD AUTO: 32.7 % (ref 37.5–51)
HGB BLD-MCNC: 10.1 G/DL (ref 13–17.7)
IMM GRANULOCYTES # BLD AUTO: 0.02 10*3/MM3 (ref 0–0.05)
IMM GRANULOCYTES NFR BLD AUTO: 0.4 % (ref 0–0.5)
LYMPHOCYTES # BLD AUTO: 0.86 10*3/MM3 (ref 0.7–3.1)
LYMPHOCYTES NFR BLD AUTO: 18.6 % (ref 19.6–45.3)
MCH RBC QN AUTO: 30.8 PG (ref 26.6–33)
MCHC RBC AUTO-ENTMCNC: 30.9 G/DL (ref 31.5–35.7)
MCV RBC AUTO: 99.7 FL (ref 79–97)
MONOCYTES # BLD AUTO: 0.59 10*3/MM3 (ref 0.1–0.9)
MONOCYTES NFR BLD AUTO: 12.7 % (ref 5–12)
NEUTROPHILS NFR BLD AUTO: 2.96 10*3/MM3 (ref 1.7–7)
NEUTROPHILS NFR BLD AUTO: 64 % (ref 42.7–76)
NRBC BLD AUTO-RTO: 0 /100 WBC (ref 0–0.2)
PLATELET # BLD AUTO: 119 10*3/MM3 (ref 140–450)
PMV BLD AUTO: 10.1 FL (ref 6–12)
RBC # BLD AUTO: 3.28 10*6/MM3 (ref 4.14–5.8)
WBC NRBC COR # BLD: 4.63 10*3/MM3 (ref 3.4–10.8)

## 2022-12-02 PROCEDURE — 85025 COMPLETE CBC W/AUTO DIFF WBC: CPT

## 2022-12-02 PROCEDURE — 36415 COLL VENOUS BLD VENIPUNCTURE: CPT

## 2022-12-02 PROCEDURE — 96372 THER/PROPH/DIAG INJ SC/IM: CPT

## 2022-12-02 PROCEDURE — 25010000002 EPOETIN ALFA-EPBX 40000 UNIT/ML SOLUTION: Performed by: NURSE PRACTITIONER

## 2022-12-02 RX ADMIN — EPOETIN ALFA-EPBX 40000 UNITS: 40000 INJECTION, SOLUTION INTRAVENOUS; SUBCUTANEOUS at 09:16

## 2022-12-06 ENCOUNTER — TELEPHONE (OUTPATIENT)
Dept: OTOLARYNGOLOGY | Facility: CLINIC | Age: 72
End: 2022-12-06

## 2022-12-06 NOTE — TELEPHONE ENCOUNTER
Caller: Ricardo Hugo     Relationship: [unfilled] SELF    Best call back number: [phone]    What is your medical concern? PT CALLED TODAY REGARDING A CONVERSATION WITH DR. JHAVERI ON A PROCEDURE HE COULD HAVE DONE. PT STATES HE WOULD LIKE TO PROCEED WITH THIS PROCEDURE. PLEASE CALL TO THE PT AND ADVISE ON THE NEXT STEPS. THANK YOU.

## 2022-12-13 ENCOUNTER — TELEPHONE (OUTPATIENT)
Dept: OTOLARYNGOLOGY | Facility: CLINIC | Age: 72
End: 2022-12-13

## 2022-12-13 NOTE — TELEPHONE ENCOUNTER
Spoke with patient and explained Dr. Chen is unavailable until next week and I will speak with him and call back

## 2022-12-16 ENCOUNTER — LAB (OUTPATIENT)
Dept: LAB | Facility: HOSPITAL | Age: 72
End: 2022-12-16

## 2022-12-16 ENCOUNTER — INFUSION (OUTPATIENT)
Dept: ONCOLOGY | Facility: HOSPITAL | Age: 72
End: 2022-12-16

## 2022-12-16 VITALS
RESPIRATION RATE: 18 BRPM | DIASTOLIC BLOOD PRESSURE: 48 MMHG | HEIGHT: 72 IN | WEIGHT: 170 LBS | TEMPERATURE: 98.2 F | HEART RATE: 57 BPM | BODY MASS INDEX: 23.03 KG/M2 | SYSTOLIC BLOOD PRESSURE: 181 MMHG | OXYGEN SATURATION: 98 %

## 2022-12-16 DIAGNOSIS — N18.4 ANEMIA DUE TO STAGE 4 CHRONIC KIDNEY DISEASE: ICD-10-CM

## 2022-12-16 DIAGNOSIS — N18.31 ANEMIA DUE TO STAGE 3A CHRONIC KIDNEY DISEASE: ICD-10-CM

## 2022-12-16 DIAGNOSIS — D63.1 ANEMIA DUE TO STAGE 3A CHRONIC KIDNEY DISEASE: ICD-10-CM

## 2022-12-16 DIAGNOSIS — D63.1 ANEMIA DUE TO STAGE 4 CHRONIC KIDNEY DISEASE: ICD-10-CM

## 2022-12-16 LAB
BASOPHILS # BLD AUTO: 0.03 10*3/MM3 (ref 0–0.2)
BASOPHILS NFR BLD AUTO: 0.6 % (ref 0–1.5)
DEPRECATED RDW RBC AUTO: 53.4 FL (ref 37–54)
EOSINOPHIL # BLD AUTO: 0.13 10*3/MM3 (ref 0–0.4)
EOSINOPHIL NFR BLD AUTO: 2.4 % (ref 0.3–6.2)
ERYTHROCYTE [DISTWIDTH] IN BLOOD BY AUTOMATED COUNT: 14.6 % (ref 12.3–15.4)
HCT VFR BLD AUTO: 35.9 % (ref 37.5–51)
HGB BLD-MCNC: 11 G/DL (ref 13–17.7)
IMM GRANULOCYTES # BLD AUTO: 0.03 10*3/MM3 (ref 0–0.05)
IMM GRANULOCYTES NFR BLD AUTO: 0.6 % (ref 0–0.5)
LYMPHOCYTES # BLD AUTO: 1.14 10*3/MM3 (ref 0.7–3.1)
LYMPHOCYTES NFR BLD AUTO: 21.3 % (ref 19.6–45.3)
MCH RBC QN AUTO: 30.1 PG (ref 26.6–33)
MCHC RBC AUTO-ENTMCNC: 30.6 G/DL (ref 31.5–35.7)
MCV RBC AUTO: 98.4 FL (ref 79–97)
MONOCYTES # BLD AUTO: 0.62 10*3/MM3 (ref 0.1–0.9)
MONOCYTES NFR BLD AUTO: 11.6 % (ref 5–12)
NEUTROPHILS NFR BLD AUTO: 3.4 10*3/MM3 (ref 1.7–7)
NEUTROPHILS NFR BLD AUTO: 63.5 % (ref 42.7–76)
NRBC BLD AUTO-RTO: 0 /100 WBC (ref 0–0.2)
PLATELET # BLD AUTO: 109 10*3/MM3 (ref 140–450)
PMV BLD AUTO: 9.8 FL (ref 6–12)
RBC # BLD AUTO: 3.65 10*6/MM3 (ref 4.14–5.8)
WBC NRBC COR # BLD: 5.35 10*3/MM3 (ref 3.4–10.8)

## 2022-12-16 PROCEDURE — 85025 COMPLETE CBC W/AUTO DIFF WBC: CPT

## 2022-12-16 PROCEDURE — G0463 HOSPITAL OUTPT CLINIC VISIT: HCPCS

## 2022-12-16 PROCEDURE — 36415 COLL VENOUS BLD VENIPUNCTURE: CPT

## 2022-12-26 DIAGNOSIS — F41.9 ANXIETY: ICD-10-CM

## 2022-12-27 RX ORDER — ALPRAZOLAM 0.25 MG/1
0.25 TABLET ORAL NIGHTLY PRN
Qty: 30 TABLET | Refills: 0 | Status: SHIPPED | OUTPATIENT
Start: 2022-12-27 | End: 2023-01-25

## 2022-12-30 ENCOUNTER — INFUSION (OUTPATIENT)
Dept: ONCOLOGY | Facility: HOSPITAL | Age: 72
End: 2022-12-30
Payer: MEDICARE

## 2022-12-30 ENCOUNTER — LAB (OUTPATIENT)
Dept: LAB | Facility: HOSPITAL | Age: 72
End: 2022-12-30
Payer: MEDICARE

## 2022-12-30 VITALS
OXYGEN SATURATION: 97 % | TEMPERATURE: 97.8 F | HEIGHT: 72 IN | DIASTOLIC BLOOD PRESSURE: 46 MMHG | WEIGHT: 166 LBS | SYSTOLIC BLOOD PRESSURE: 169 MMHG | BODY MASS INDEX: 22.48 KG/M2 | RESPIRATION RATE: 18 BRPM | HEART RATE: 51 BPM

## 2022-12-30 DIAGNOSIS — D63.1 ANEMIA DUE TO STAGE 4 CHRONIC KIDNEY DISEASE: ICD-10-CM

## 2022-12-30 DIAGNOSIS — N18.4 ANEMIA DUE TO STAGE 4 CHRONIC KIDNEY DISEASE: ICD-10-CM

## 2022-12-30 LAB
BASOPHILS # BLD AUTO: 0.05 10*3/MM3 (ref 0–0.2)
BASOPHILS NFR BLD AUTO: 0.9 % (ref 0–1.5)
DEPRECATED RDW RBC AUTO: 50.9 FL (ref 37–54)
EOSINOPHIL # BLD AUTO: 0.22 10*3/MM3 (ref 0–0.4)
EOSINOPHIL NFR BLD AUTO: 4 % (ref 0.3–6.2)
ERYTHROCYTE [DISTWIDTH] IN BLOOD BY AUTOMATED COUNT: 14.1 % (ref 12.3–15.4)
HCT VFR BLD AUTO: 34.3 % (ref 37.5–51)
HGB BLD-MCNC: 10.6 G/DL (ref 13–17.7)
IMM GRANULOCYTES # BLD AUTO: 0.03 10*3/MM3 (ref 0–0.05)
IMM GRANULOCYTES NFR BLD AUTO: 0.5 % (ref 0–0.5)
LYMPHOCYTES # BLD AUTO: 1.21 10*3/MM3 (ref 0.7–3.1)
LYMPHOCYTES NFR BLD AUTO: 21.8 % (ref 19.6–45.3)
MCH RBC QN AUTO: 30.4 PG (ref 26.6–33)
MCHC RBC AUTO-ENTMCNC: 30.9 G/DL (ref 31.5–35.7)
MCV RBC AUTO: 98.3 FL (ref 79–97)
MONOCYTES # BLD AUTO: 0.52 10*3/MM3 (ref 0.1–0.9)
MONOCYTES NFR BLD AUTO: 9.4 % (ref 5–12)
NEUTROPHILS NFR BLD AUTO: 3.53 10*3/MM3 (ref 1.7–7)
NEUTROPHILS NFR BLD AUTO: 63.4 % (ref 42.7–76)
NRBC BLD AUTO-RTO: 0 /100 WBC (ref 0–0.2)
PLATELET # BLD AUTO: 115 10*3/MM3 (ref 140–450)
PMV BLD AUTO: 10.1 FL (ref 6–12)
RBC # BLD AUTO: 3.49 10*6/MM3 (ref 4.14–5.8)
WBC NRBC COR # BLD: 5.56 10*3/MM3 (ref 3.4–10.8)

## 2022-12-30 PROCEDURE — 36415 COLL VENOUS BLD VENIPUNCTURE: CPT

## 2022-12-30 PROCEDURE — 85025 COMPLETE CBC W/AUTO DIFF WBC: CPT

## 2022-12-30 PROCEDURE — G0463 HOSPITAL OUTPT CLINIC VISIT: HCPCS

## 2022-12-30 RX ORDER — LEVOTHYROXINE SODIUM 0.07 MG/1
75 TABLET ORAL DAILY
Qty: 90 TABLET | Refills: 3 | Status: SHIPPED | OUTPATIENT
Start: 2022-12-30 | End: 2023-01-05 | Stop reason: SDUPTHER

## 2022-12-30 RX ORDER — FUROSEMIDE 20 MG/1
20 TABLET ORAL 2 TIMES DAILY
Qty: 180 TABLET | Refills: 3 | Status: SHIPPED | OUTPATIENT
Start: 2022-12-30 | End: 2023-02-14 | Stop reason: SDUPTHER

## 2022-12-30 RX ORDER — CLONIDINE HYDROCHLORIDE 0.2 MG/1
0.6 TABLET ORAL DAILY
Qty: 270 TABLET | Refills: 3 | Status: SHIPPED | OUTPATIENT
Start: 2022-12-30 | End: 2023-03-27

## 2023-01-04 DIAGNOSIS — N18.30 ANEMIA DUE TO STAGE 3 CHRONIC KIDNEY DISEASE, UNSPECIFIED WHETHER STAGE 3A OR 3B CKD: Primary | ICD-10-CM

## 2023-01-04 DIAGNOSIS — D63.1 ANEMIA DUE TO STAGE 3 CHRONIC KIDNEY DISEASE, UNSPECIFIED WHETHER STAGE 3A OR 3B CKD: Primary | ICD-10-CM

## 2023-01-05 RX ORDER — LEVOTHYROXINE SODIUM 0.07 MG/1
75 TABLET ORAL DAILY
Qty: 90 TABLET | Refills: 3 | Status: SHIPPED | OUTPATIENT
Start: 2023-01-05

## 2023-01-06 DIAGNOSIS — D63.1 ANEMIA DUE TO STAGE 3 CHRONIC KIDNEY DISEASE, UNSPECIFIED WHETHER STAGE 3A OR 3B CKD: Primary | ICD-10-CM

## 2023-01-06 DIAGNOSIS — N18.30 ANEMIA DUE TO STAGE 3 CHRONIC KIDNEY DISEASE, UNSPECIFIED WHETHER STAGE 3A OR 3B CKD: Primary | ICD-10-CM

## 2023-01-13 ENCOUNTER — OFFICE VISIT (OUTPATIENT)
Dept: ONCOLOGY | Facility: CLINIC | Age: 73
End: 2023-01-13
Payer: MEDICARE

## 2023-01-13 ENCOUNTER — LAB (OUTPATIENT)
Dept: LAB | Facility: HOSPITAL | Age: 73
End: 2023-01-13
Payer: MEDICARE

## 2023-01-13 ENCOUNTER — INFUSION (OUTPATIENT)
Dept: ONCOLOGY | Facility: HOSPITAL | Age: 73
End: 2023-01-13
Payer: MEDICARE

## 2023-01-13 VITALS
HEART RATE: 55 BPM | TEMPERATURE: 97.4 F | SYSTOLIC BLOOD PRESSURE: 136 MMHG | OXYGEN SATURATION: 98 % | RESPIRATION RATE: 16 BRPM | BODY MASS INDEX: 22.75 KG/M2 | WEIGHT: 168 LBS | DIASTOLIC BLOOD PRESSURE: 68 MMHG | HEIGHT: 72 IN

## 2023-01-13 VITALS
OXYGEN SATURATION: 100 % | HEIGHT: 72 IN | BODY MASS INDEX: 22.75 KG/M2 | SYSTOLIC BLOOD PRESSURE: 180 MMHG | HEART RATE: 57 BPM | DIASTOLIC BLOOD PRESSURE: 49 MMHG | RESPIRATION RATE: 20 BRPM | WEIGHT: 168 LBS | TEMPERATURE: 97.7 F

## 2023-01-13 DIAGNOSIS — N18.30 ANEMIA DUE TO STAGE 3 CHRONIC KIDNEY DISEASE, UNSPECIFIED WHETHER STAGE 3A OR 3B CKD: Primary | ICD-10-CM

## 2023-01-13 DIAGNOSIS — K70.30 ALCOHOLIC CIRRHOSIS, UNSPECIFIED WHETHER ASCITES PRESENT: ICD-10-CM

## 2023-01-13 DIAGNOSIS — D50.9 IRON DEFICIENCY ANEMIA, UNSPECIFIED IRON DEFICIENCY ANEMIA TYPE: ICD-10-CM

## 2023-01-13 DIAGNOSIS — D63.1 ANEMIA DUE TO STAGE 3 CHRONIC KIDNEY DISEASE, UNSPECIFIED WHETHER STAGE 3A OR 3B CKD: Primary | ICD-10-CM

## 2023-01-13 DIAGNOSIS — C91.10 CLL (CHRONIC LYMPHOCYTIC LEUKEMIA): ICD-10-CM

## 2023-01-13 DIAGNOSIS — D63.1 ANEMIA DUE TO STAGE 4 CHRONIC KIDNEY DISEASE: Primary | ICD-10-CM

## 2023-01-13 DIAGNOSIS — N18.4 ANEMIA DUE TO STAGE 4 CHRONIC KIDNEY DISEASE: Primary | ICD-10-CM

## 2023-01-13 LAB
ALBUMIN SERPL-MCNC: 4.3 G/DL (ref 3.5–5.2)
ALBUMIN/GLOB SERPL: 1.7 G/DL
ALP SERPL-CCNC: 160 U/L (ref 39–117)
ALT SERPL W P-5'-P-CCNC: 21 U/L (ref 1–41)
ANION GAP SERPL CALCULATED.3IONS-SCNC: 15 MMOL/L (ref 5–15)
AST SERPL-CCNC: 20 U/L (ref 1–40)
BILIRUB SERPL-MCNC: 0.4 MG/DL (ref 0–1.2)
BUN SERPL-MCNC: 39 MG/DL (ref 8–23)
BUN/CREAT SERPL: 14.6 (ref 7–25)
CALCIUM SPEC-SCNC: 8.6 MG/DL (ref 8.6–10.5)
CHLORIDE SERPL-SCNC: 103 MMOL/L (ref 98–107)
CO2 SERPL-SCNC: 20 MMOL/L (ref 22–29)
CREAT SERPL-MCNC: 2.67 MG/DL (ref 0.76–1.27)
DEPRECATED RDW RBC AUTO: 50.8 FL (ref 37–54)
EGFRCR SERPLBLD CKD-EPI 2021: 24.3 ML/MIN/1.73
ERYTHROCYTE [DISTWIDTH] IN BLOOD BY AUTOMATED COUNT: 14.1 % (ref 12.3–15.4)
FERRITIN SERPL-MCNC: 435.6 NG/ML (ref 30–400)
GLOBULIN UR ELPH-MCNC: 2.5 GM/DL
GLUCOSE SERPL-MCNC: 134 MG/DL (ref 65–99)
HCT VFR BLD AUTO: 33.1 % (ref 37.5–51)
HGB BLD-MCNC: 10.1 G/DL (ref 13–17.7)
HOLD SPECIMEN: NORMAL
IRON 24H UR-MRATE: 76 MCG/DL (ref 59–158)
IRON SATN MFR SERPL: 31 % (ref 20–50)
MCH RBC QN AUTO: 29.6 PG (ref 26.6–33)
MCHC RBC AUTO-ENTMCNC: 30.5 G/DL (ref 31.5–35.7)
MCV RBC AUTO: 97.1 FL (ref 79–97)
PLATELET # BLD AUTO: 112 10*3/MM3 (ref 140–450)
PMV BLD AUTO: 10.1 FL (ref 6–12)
POTASSIUM SERPL-SCNC: 4.1 MMOL/L (ref 3.5–5.2)
PROT SERPL-MCNC: 6.8 G/DL (ref 6–8.5)
RBC # BLD AUTO: 3.41 10*6/MM3 (ref 4.14–5.8)
SODIUM SERPL-SCNC: 138 MMOL/L (ref 136–145)
TIBC SERPL-MCNC: 244 MCG/DL (ref 298–536)
TRANSFERRIN SERPL-MCNC: 164 MG/DL (ref 200–360)
WBC NRBC COR # BLD: 6.57 10*3/MM3 (ref 3.4–10.8)

## 2023-01-13 PROCEDURE — 82728 ASSAY OF FERRITIN: CPT

## 2023-01-13 PROCEDURE — 80053 COMPREHEN METABOLIC PANEL: CPT

## 2023-01-13 PROCEDURE — 85027 COMPLETE CBC AUTOMATED: CPT

## 2023-01-13 PROCEDURE — 99214 OFFICE O/P EST MOD 30 MIN: CPT | Performed by: NURSE PRACTITIONER

## 2023-01-13 PROCEDURE — 84466 ASSAY OF TRANSFERRIN: CPT

## 2023-01-13 PROCEDURE — 96372 THER/PROPH/DIAG INJ SC/IM: CPT

## 2023-01-13 PROCEDURE — 83540 ASSAY OF IRON: CPT

## 2023-01-13 PROCEDURE — 36415 COLL VENOUS BLD VENIPUNCTURE: CPT

## 2023-01-13 PROCEDURE — 25010000002 EPOETIN ALFA-EPBX 40000 UNIT/ML SOLUTION: Performed by: NURSE PRACTITIONER

## 2023-01-13 RX ADMIN — EPOETIN ALFA-EPBX 40000 UNITS: 40000 INJECTION, SOLUTION INTRAVENOUS; SUBCUTANEOUS at 09:33

## 2023-01-13 NOTE — PROGRESS NOTES
[facility] HEMATOLOGY & ONCOLOGY  [address]  [facility] [address] [phone]    Patient Name: Ricardo Hugo  Encounter Date: 01/13/2023  YOB: 1950   Patient Number: [id number]    Hematology / Oncology Progress Note      HPI / REASON FOR VISIT: Ricardo Hugo is a 72 y.o. male who is followed by this office for CLL, Iron Deficiency Anemia, Chronic Kidney Disease.  He sees Dr. Rueda for nephrology.      He has received Injectafer for WALE and Epogen for anemia in CKD in the past.    He presents to the clinic today for continued follow up.  He has no acute complaint.    He had labs drawn and results were reviewed with him today     LABS    Lab Results - Last 18 Months   Lab Units 01/13/23  0811 12/30/22  0824 12/16/22  0831 12/02/22  0856 11/18/22  0856 11/04/22  0728 10/06/22  0751 09/23/22  0800 09/08/22  1333 04/29/22  1214 04/01/22  0753 10/29/21  1614 10/29/21  0502 10/28/21  1750   HEMOGLOBIN g/dL 10.1* 10.6* 11.0* 10.1* 9.9* 10.0*   < > 10.5* 9.6*   < > 10.1*   < > 7.5* 8.0*   HEMATOCRIT % 33.1* 34.3* 35.9* 32.7* 31.7* 32.1*   < > 32.7* 29.5*   < > 31.3*   < > 22.6* 25.5*   MCV fL 97.1* 98.3* 98.4* 99.7* 99.1* 97.9*   < > 97.0 96.1   < > 95.7   < > 94.6 98.1*   WBC 10*3/mm3 6.57 5.56 5.35 4.63 5.74 6.48   < > 6.64 4.87   < > 5.40   < > 4.64 16.97*   RDW % 14.1 14.1 14.6 14.3 13.3 13.1   < > 14.1 13.9   < > 13.6   < > 15.3 16.4*   MPV fL 10.1 10.1 9.8 10.1 10.2 10.2   < > 9.8 9.4   < > 9.8   < > 9.7 9.4   PLATELETS 10*3/mm3 112* 115* 109* 119* 111* 128*   < > 126* 147   < > 116*   < > 151 348   IMM GRAN % %  --  0.5 0.6* 0.4 0.3  --   --  1.1* 2.3*   < >  --    < >  --   --    NEUTROS ABS 10*3/mm3  --  3.53 3.40 2.96 3.66  --   --  3.97 3.02   < > 3.76   < > 4.04 13.71*   LYMPHS ABS 10*3/mm3  --  1.21 1.14 0.86 1.21  --   --  1.61 0.97   < >  --    < >  --   --    MONOS ABS 10*3/mm3  --  0.52 0.62 0.59 0.68  --   --   0.77 0.60   < >  --    < >  --   --    EOS ABS 10*3/mm3  --  0.22 0.13 0.17 0.13  --   --  0.18 0.13   < > 0.16   < >  --   --    BASOS ABS 10*3/mm3  --  0.05 0.03 0.03 0.04  --   --  0.04 0.04   < > 0.22*   < >  --   --    IMMATURE GRANS (ABS) 10*3/mm3  --  0.03 0.03 0.02 0.02  --   --  0.07* 0.11*   < >  --    < >  --   --    NRBC /100 WBC  --  0.0 0.0 0.0 0.0  --   --  0.0 0.0   < >  --    < > 1.0*  --    NEUTROPHIL % %  --   --   --   --   --   --   --   --   --   --  69.7  --  74.0 56.6   MONOCYTES % %  --   --   --   --   --   --   --   --   --   --  11.1  --  7.0 6.1   BASOPHIL % %  --   --   --   --   --   --   --   --   --   --  4.0*  --   --   --    ATYP LYMPH % %  --   --   --   --   --   --   --   --   --   --  5.1*  --   --   --    ANISOCYTOSIS   --   --   --   --   --   --   --   --   --   --   --   --   --  Slight/1+    < > = values in this interval not displayed.       Lab Results - Last 18 Months   Lab Units 11/18/22  0856 08/26/22  0845 07/01/22  0812 05/27/22  1222 04/29/22  1214 04/01/22  0753 02/15/22  1447 01/10/22  0717 11/11/21  1010 11/05/21  1113 11/03/21  0537 11/02/21  0600   GLUCOSE mg/dL 116* 98 121* 114* 107* 100* 105* 133* 128* 109* 101* 96   SODIUM mmol/L 141 136 139 141 138 142 138 139 141 140 139 142   POTASSIUM mmol/L 4.5 4.8 4.4 4.4 4.9 4.9 4.7 4.8 4.4 4.6 3.9 3.5   CO2 mmol/L 19.0* 20.0* 20.0* 21.0* 19.0* 22.0 17.0* 21.0* 17.0* 24.0 24.0 24.0   CHLORIDE mmol/L 109* 105 107 105 107 107 105 108* 111* 109* 108* 108*   ANION GAP mmol/L 13.0 11.0 12.0 15.0 12.0 13.0 16.0* 10.0 13.0 7.0 7.0 10.0   CREATININE mg/dL 2.44* 2.45* 2.57* 2.52* 2.36* 2.68* 2.72* 2.58* 2.66* 2.58* 2.38* 2.46*   BUN mg/dL 57* 53* 47* 43* 68* 42* 56* 50* 43* 31* 21 27*   BUN / CREAT RATIO  23.4 21.6 18.3 17.1 28.8* 15.7 20.6 19.4 16.2 12.0 8.8 11.0   CALCIUM mg/dL 9.1 8.6 8.6 8.9 8.1* 9.1 8.8 8.8 8.5* 8.2* 8.0* 8.1*   EGFR IF NONAFRICN AM mL/min/1.73  --   --   --   --   --   --  23* 25* 24* 25* 27* 26*   ALK  PHOS U/L 172* 135* 156* 168* 153* 238* 183* 191* 197* 189* 132* 130*   TOTAL PROTEIN g/dL 7.0 6.7 7.0 7.4 6.6 6.6 7.1 7.3 6.5 5.7* 4.9* 5.3*   ALT (SGPT) U/L 20 14 17 18 35 21 20 19 17 16 11 9   AST (SGOT) U/L 19 13 14 18 17 18 18 20 22 28 24 20   BILIRUBIN mg/dL 0.3 0.4 0.3 0.2 0.3 0.2 0.3 0.3 0.2 <0.2 0.2 0.2   ALBUMIN g/dL 4.30 3.80 4.10 4.30 3.70 4.00 4.20 4.30 3.80 3.30* 2.50* 3.00*   GLOBULIN gm/dL 2.7 2.9 2.9 3.1 2.9 2.6 2.9 3.0 2.7 2.4 2.4 2.3       Lab Results - Last 18 Months   Lab Units 10/29/21  1201 10/28/21  1750 07/27/21  1245 07/20/21  1029   URIC ACID mg/dL 9.2*  --  9.5* 9.2*   LDH U/L  --  166  --   --        Lab Results - Last 18 Months   Lab Units 11/18/22  0856 07/01/22  0812 05/27/22  1222 04/29/22  1214 04/01/22  0753 03/15/22  1315 11/03/21  0537 10/29/21  0502   IRON mcg/dL 56* 94 94 128 80 88   < >  --    TIBC mcg/dL 277* 288* 298 241* 262* 258*   < >  --    IRON SATURATION % 20 33 32 53* 31 34   < >  --    FERRITIN ng/mL 167.60 254.20 281.30 298.80 320.90 295.30   < >  --    TSH uIU/mL  --   --   --   --   --   --   --  0.898    < > = values in this interval not displayed.         PAST MEDICAL HISTORY:  ALLERGIES:  Allergies   Allergen Reactions   • Ondansetron Anaphylaxis and GI Intolerance   • Zofran [Ondansetron Hcl] Anaphylaxis   • Lortab [Hydrocodone-Acetaminophen] Other (See Comments) and Hallucinations     CLOSTROPHOBIC   • Allopurinol Other (See Comments)     Pain on right side     CURRENT MEDICATIONS:  Outpatient Encounter Medications as of 1/13/2023   Medication Sig Dispense Refill   • albuterol sulfate  (90 Base) MCG/ACT inhaler USE 2 INHALATIONS FOUR TIMES A DAY AS NEEDED 20.1 g 3   • ALPRAZolam (XANAX) 0.25 MG tablet TAKE 1 TABLET BY MOUTH AT NIGHT AS NEEDED FOR ANXIETY 30 tablet 0   • amLODIPine (NORVASC) 10 MG tablet Take 1 tablet by mouth Daily. 90 tablet 3   • aspirin (aspirin) 81 MG EC tablet Take  by mouth Daily.     • atorvastatin (LIPITOR) 10 MG tablet Take 1  tablet by mouth Daily. 90 tablet 3   • azelastine (ASTELIN) 0.1 % nasal spray USE 1 SPRAY IN EACH NOSTRIL TWICE A DAY AS NEEDED 90 mL 1   • cholestyramine (QUESTRAN) 4 g packet Take 1 packet by mouth Daily. 90 packet 3   • cloNIDine (CATAPRES) 0.2 MG tablet Take 3 tablets by mouth Daily. 270 tablet 3   • clopidogrel (PLAVIX) 75 MG tablet TAKE 1 TABLET DAILY 90 tablet 3   • diphenhydrAMINE (BENADRYL) 25 mg capsule Take 25 mg by mouth Every 6 (Six) Hours As Needed for Itching.     • fexofenadine (ALLEGRA) 180 MG tablet Take 180 mg by mouth Daily.     • fluticasone (FLONASE) 50 MCG/ACT nasal spray 2 sprays into the nostril(s) as directed by provider Daily As Needed for Rhinitis.     • furosemide (Lasix) 20 MG tablet Take 1 tablet by mouth 2 (Two) Times a Day. 180 tablet 3   • hydrALAZINE (APRESOLINE) 25 MG tablet Take 1 tablet by mouth 3 (Three) Times a Day. (Patient taking differently: Take 100 mg by mouth 3 (Three) Times a Day. 100mg daily) 90 tablet 5   • levothyroxine (Synthroid) 75 MCG tablet Take 1 tablet by mouth Daily. 90 tablet 3   • magnesium chloride ER 64 MG DR tablet Take 143 mg by mouth Daily.     • Melatonin 10 MG capsule Take 2 capsules by mouth Every Night.     • mesalamine (APRISO) 0.375 g 24 hr capsule Take 4 capsules by mouth Daily. 360 capsule 3   • metoprolol succinate XL (TOPROL-XL) 25 MG 24 hr tablet TAKE 1 TABLET DAILY 90 tablet 3   • montelukast (SINGULAIR) 10 MG tablet TAKE 1 TABLET BY MOUTH EVERY NIGHT 30 tablet 11   • Multiple Vitamins-Minerals (PRESERVISION/LUTEIN) capsule Take 1 capsule by mouth 2 (two) times a day.     • omeprazole (priLOSEC) 20 MG capsule TAKE 1 CAPSULE DAILY 90 capsule 1   • Oxymetazoline HCl (Nasal Spray) 0.05 % solution 2 squirts to each nostril twice a day x10 days     • potassium chloride 10 MEQ CR tablet Take 1 tablet by mouth 2 (Two) Times a Day. 180 tablet 3   • sodium bicarbonate 650 MG tablet Take 0.5 tablets by mouth 2 (Two) Times a Day. (Patient taking  differently: Take 325 mg by mouth 3 (Three) Times a Day.) 180 tablet 3   • valsartan (DIOVAN) 160 MG tablet Take 1 tablet by mouth Daily. 90 tablet 3   • vitamin D (ERGOCALCIFEROL) 1.25 MG (26179 UT) capsule capsule Take 1 capsule by mouth 1 (One) Time Per Week. 15 capsule 3   • [DISCONTINUED] Copper Gluconate 2 MG tablet Take 2 mg by mouth Daily. 30 tablet 6     No facility-administered encounter medications on file as of 1/13/2023.     ADULT ILLNESSES:  Patient Active Problem List   Diagnosis Code   • Chronic rhinitis J31.0   • Acute renal failure superimposed on stage 4 chronic kidney disease ([facility]) N17.9, N18.4   • Hyponatremia E87.1   • Acute cystitis N30.00   • Metabolic acidosis, increased anion gap E87.29   • Moderate malnutrition ([facility]) E44.0   • Hypertension, benign I10   • Sepsis ([facility]) A41.9   • Enterocolitis K52.9   • Chronic diarrhea K52.9   • UTI (urinary tract infection), bacterial N39.0, A49.9   • Crohn's disease of large intestine with other complication ([facility]) K50.118   • Sensorineural hearing loss (SNHL) of left ear with restricted hearing of right ear H90.A22   • Symptomatic anemia D64.9   • CKD (chronic kidney disease) stage 4, GFR 15-29 ml/min ([facility]) N18.4   • Bruit of right carotid artery R09.89   • Wellness examination Z00.00   • Pre-op evaluation Z01.818   • PAD (peripheral artery disease) ([facility]) I73.9   • IHD (ischemic heart disease) I25.9   • Carotid stenosis I65.29   • Stenosis of right carotid artery I65.21   • Preop testing Z01.818   • Carotid stenosis, right I65.21   • Avascular necrosis of bone of hip ([facility]) M87.059   • Diastolic dysfunction I51.89   • Shortness of breath R06.02   • CRD (chronic renal disease), stage IV ([facility]) N18.4   • Thrombocytopenia ([facility]) D69.6   • History of alcoholism ([facility]) F10.21   • Anemia due to chronic kidney disease N18.9, D63.1   • Copper deficiency E61.0   • Low iron  E61.1   • CLL (chronic lymphocytic leukemia) ([facility]) C91.10   • Pulmonary hypertension (HCC)  I27.20   • GI bleed K92.2   • Perforation of left tympanic membrane H72.92   • Eustachian tube dysfunction, left H69.82   • Mixed hyperlipidemia E78.2     SURGERIES:  Past Surgical History:   Procedure Laterality Date   • ARTERY SURGERY  2021    right side on neck   • CAROTID ENDARTERECTOMY Right 5/10/2021    Procedure: RIGHT CAROTID ENDARTERECTOMY WITH EEG;  Surgeon: Gil Pineda DO;  Location: [facility] HYBRID OR 12;  Service: Vascular;  Laterality: Right;   • COLONOSCOPY N/A 7/2/2020    Procedure: COLONOSCOPY WITH ANESTHESIA;  Surgeon: Adrien Brewster MD;  Location: [facility] ENDOSCOPY;  Service: Gastroenterology;  Laterality: N/A;  pre op: diarrhea  post op: polyps  PCP: Joe Velasco MD   • COLONOSCOPY N/A 10/13/2020    Procedure: COLONOSCOPY WITH ANESTHESIA;  Surgeon: Adrien Brewster MD;  Location: [facility] ENDOSCOPY;  Service: Gastroenterology;  Laterality: N/A;  Pre: Chronic Diarrhea, Crohn's  Post: AVM  Dr. Neftali Velasco  CO2 Inflation Used   • CORONARY ARTERY BYPASS GRAFT  2003    x3   • ENDOSCOPY N/A 11/2/2021    Procedure: ESOPHAGOGASTRODUODENOSCOPY WITH ANESTHESIA;  Surgeon: Bridger Bell MD;  Location: [facility] ENDOSCOPY;  Service: Gastroenterology;  Laterality: N/A;  pre anemia;gi bleed  post  gi bleed;schatski ring  Dr. ERIC Velasco   • EYE SURGERY Bilateral     catorac   • INCISION AND DRAINAGE PERIRECTAL ABSCESS N/A 3/3/2017    Procedure: INCISION AND DRAINAGE OF JEET ANAL ABSCESS;  Surgeon: Lynette Smith MD;  Location: [facility] OR;  Service:    • MYRINGOTOMY W/ TUBES Left 04/17/2017    06/10/2016   • TONSILLECTOMY     • TOTAL HIP ARTHROPLASTY Right 2006     HEALTH MAINTENANCE ITEMS:  Health Maintenance Due   Topic Date Due   • TDAP/TD VACCINES (1 - Tdap) Never done   • Hepatitis B (1 of 3 - Risk 3-dose series) Never done   • Pneumococcal Vaccine 65+ (3 - PPSV23 if available, else PCV20) 12/28/2015   • COVID-19 Vaccine (4 - Booster for Moderna series) 10/25/2021   • ANNUAL WELLNESS VISIT   2022   • LIPID PANEL  10/29/2022       <no information>  Last Completed Colonoscopy          COLORECTAL CANCER SCREENING (COLONOSCOPY - Every 3 Years) Next due on 10/13/2023    10/28/2021  POC Occult Blood Stool    10/13/2020  Surgical Procedure: COLONOSCOPY    10/13/2020  COLONOSCOPY    2020  Surgical Procedure: COLONOSCOPY    2020  COLONOSCOPY    Only the first 5 history entries have been loaded, but more history exists.              Immunization History   Administered Date(s) Administered   • COVID-19 (MODERNA) 1st, 2nd, 3rd Dose Only 2021, 2021, 2021   • Flu Vaccine Split Quad 2019   • FluLaval/Fluzone >6mos 2020   • Fluzone High-Dose 65+yrs 10/12/2021   • Fluzone Quad >6mos (Multi-dose) 2018, 2019, 2020   • Influenza TIV (IM) 10/18/2017   • Influenza Whole 2015   • Pneumococcal Conjugate 13-Valent (PCV13) 2014   • Pneumococcal Polysaccharide (PPSV23) 2006   • Shingrix 2021, 2022   • Zostavax 2014     Last Completed Mammogram     This patient has no relevant Health Maintenance data.            FAMILY HISTORY:  Family History   Problem Relation Age of Onset   • No Known Problems Mother    • No Known Problems Father    • No Known Problems Daughter    • Colon cancer Neg Hx    • Colon polyps Neg Hx      SOCIAL HISTORY:  Social History     Socioeconomic History   • Marital status:    Tobacco Use   • Smoking status: Former     Packs/day: 0.50     Years: 25.00     Pack years: 12.50     Types: Cigarettes     Start date:      Quit date: 10/13/2013     Years since quittin.2   • Smokeless tobacco: Never   • Tobacco comments:     quit    Vaping Use   • Vaping Use: Never used   Substance and Sexual Activity   • Alcohol use: Not Currently   • Drug use: No   • Sexual activity: Defer       REVIEW OF SYSTEMS:  Review of Systems   Constitutional: Negative for activity change, appetite change, fatigue, fever,  "unexpected weight gain and unexpected weight loss.   HENT: Negative for dental problem, facial swelling, swollen glands and trouble swallowing.    Eyes: Negative for double vision and discharge.   Respiratory: Negative for cough, shortness of breath and wheezing.    Cardiovascular: Negative for chest pain, palpitations and leg swelling.   Gastrointestinal: Negative for abdominal pain, blood in stool, nausea and vomiting.   Endocrine: Negative.    Genitourinary: Negative for dysuria and hematuria.   Musculoskeletal: Negative for arthralgias and myalgias.   Skin: Negative for rash, skin lesions and wound.   Allergic/Immunologic: Negative for immunocompromised state.   Neurological: Negative for speech difficulty, light-headedness, headache, memory problem and confusion.   Hematological: Negative for adenopathy.   Psychiatric/Behavioral: Negative for self-injury, suicidal ideas and depressed mood. The patient is not nervous/anxious.        /68   Pulse 55   Temp 97.4 °F (36.3 °C)   Resp 16   Ht 182.9 cm (72\")   Wt 76.2 kg (168 lb)   SpO2 98%   BMI 22.78 kg/m²  Body surface area is 1.98 meters squared.    Pain Score    01/13/23 0818   PainSc: 0-No pain       Physical Exam:  Physical Exam  Constitutional:       Appearance: Normal appearance.   HENT:      Head: Normocephalic and atraumatic.   Cardiovascular:      Rate and Rhythm: Normal rate and regular rhythm.   Pulmonary:      Effort: Pulmonary effort is normal.      Breath sounds: Normal breath sounds.   Abdominal:      General: Bowel sounds are normal.      Palpations: Abdomen is soft.   Musculoskeletal:      Right lower leg: No edema.      Left lower leg: No edema.   Skin:     General: Skin is warm and dry.   Neurological:      Mental Status: He is alert and oriented to person, place, and time.   Psychiatric:         Attention and Perception: Attention normal.         Mood and Affect: Mood normal.         Judgment: Judgment normal.         Ricardo DUMAS" Hugo reports a pain score of 0.  No intervention indicated         ASSESSMENT / PLAN    1. Anemia due to stage 3 chronic kidney disease, unspecified whether stage 3a or 3b CKD (HCC)    2. CLL (chronic lymphocytic leukemia) (HCC)    3. Iron deficiency anemia, unspecified iron deficiency anemia type    4. Alcoholic cirrhosis, unspecified whether ascites present (HCC)       ASSESSMENT:      retacrit feb 17  injectafer dec 15    1.  CLL  - Clinical stage from 7/20/2021:  - Lymphocytosis: Absent, Adenopathy: Absent, Organomegaly: Absent,   Anemia: Present, Thrombocytopenia: Present  -WBC 6.57, Hgb 10.1, Hct 33.1, Plt 112  -NO unintentional weight loss, fever or night sweats   PLAN:  Stable for observation    2.  Anemia in Chronic Kidney Disease   -Hemoglobin 10.1, Hematocrit 33.1  -BUN 39, Creatinine 2.67, GFR 24.3  -Serum iron 56, Ferritin 167, saturation 20%, TIBC 277  -He will get Retacrit today   Continue monthly for Hgb < 11 Or HCT < 33   -Sees Dr. Francis, Nephrology    3.  Iron Deficiency Anemia   -Hgb 10.6, Hct 34.3  -Serum iron 56, Ferritin 167, saturation 20%, TIBC 277  -Had Injectafer December 1, 2022  -Labs pending    4. Cirrhosis of Liver  -History of chronic alcoholism  - Cirrhotic morphology/appearance of the liver on 07/12/21 ultrasound  - Alk phos 160  -PLAN:  Monitor      PLAN:  Continue Retacrit 40K units  monthly for Hgb < 22 or Hct < 33  -Continue current medications, treatment plans and follow up with PCP and any other specialist  Return to office in 3 months with CBC, CMP prior to appointment  Care discussed with patient.  Understanding expressed.  Patient agreeable with plan.      Becky Camacho, APRN  01/13/2023

## 2023-01-24 DIAGNOSIS — F41.9 ANXIETY: ICD-10-CM

## 2023-01-24 DIAGNOSIS — E55.9 VITAMIN D DEFICIENCY: Primary | ICD-10-CM

## 2023-01-24 RX ORDER — ERGOCALCIFEROL 1.25 MG/1
50000 CAPSULE ORAL WEEKLY
Qty: 12 CAPSULE | Refills: 3 | Status: CANCELLED | OUTPATIENT
Start: 2023-01-24

## 2023-01-25 DIAGNOSIS — E55.9 VITAMIN D DEFICIENCY: ICD-10-CM

## 2023-01-25 RX ORDER — ALPRAZOLAM 0.25 MG/1
0.25 TABLET ORAL NIGHTLY PRN
Qty: 30 TABLET | Refills: 2 | Status: SHIPPED | OUTPATIENT
Start: 2023-01-25

## 2023-01-27 ENCOUNTER — LAB (OUTPATIENT)
Dept: INTERNAL MEDICINE | Facility: CLINIC | Age: 73
End: 2023-01-27
Payer: MEDICARE

## 2023-01-27 DIAGNOSIS — E78.2 MIXED HYPERLIPIDEMIA: ICD-10-CM

## 2023-01-27 DIAGNOSIS — N18.4 ANEMIA DUE TO STAGE 4 CHRONIC KIDNEY DISEASE: ICD-10-CM

## 2023-01-27 DIAGNOSIS — I10 HYPERTENSION, BENIGN: Primary | ICD-10-CM

## 2023-01-27 DIAGNOSIS — Z12.5 SCREENING PSA (PROSTATE SPECIFIC ANTIGEN): ICD-10-CM

## 2023-01-27 DIAGNOSIS — Z79.899 ENCOUNTER FOR LONG-TERM (CURRENT) DRUG USE: ICD-10-CM

## 2023-01-27 DIAGNOSIS — D63.1 ANEMIA DUE TO STAGE 4 CHRONIC KIDNEY DISEASE: ICD-10-CM

## 2023-01-28 LAB
ALBUMIN SERPL-MCNC: 4.3 G/DL (ref 3.5–5.2)
ALBUMIN/GLOB SERPL: 2 G/DL
ALP SERPL-CCNC: 156 U/L (ref 39–117)
ALT SERPL-CCNC: 19 U/L (ref 1–41)
APPEARANCE UR: CLEAR
AST SERPL-CCNC: 20 U/L (ref 1–40)
BACTERIA #/AREA URNS HPF: NORMAL /HPF
BASOPHILS # BLD AUTO: 0.04 10*3/MM3 (ref 0–0.2)
BASOPHILS NFR BLD AUTO: 0.8 % (ref 0–1.5)
BILIRUB SERPL-MCNC: 0.4 MG/DL (ref 0–1.2)
BILIRUB UR QL STRIP: NEGATIVE
BUN SERPL-MCNC: 42 MG/DL (ref 8–23)
BUN/CREAT SERPL: 16.5 (ref 7–25)
CALCIUM SERPL-MCNC: 8.8 MG/DL (ref 8.6–10.5)
CASTS URNS MICRO: NORMAL
CHLORIDE SERPL-SCNC: 108 MMOL/L (ref 98–107)
CHOLEST SERPL-MCNC: 101 MG/DL (ref 0–200)
CO2 SERPL-SCNC: 21.9 MMOL/L (ref 22–29)
COLOR UR: YELLOW
CREAT SERPL-MCNC: 2.54 MG/DL (ref 0.76–1.27)
EGFRCR SERPLBLD CKD-EPI 2021: 25.8 ML/MIN/1.73
EOSINOPHIL # BLD AUTO: 0.14 10*3/MM3 (ref 0–0.4)
EOSINOPHIL NFR BLD AUTO: 2.9 % (ref 0.3–6.2)
EPI CELLS #/AREA URNS HPF: NORMAL /HPF
ERYTHROCYTE [DISTWIDTH] IN BLOOD BY AUTOMATED COUNT: 14.3 % (ref 12.3–15.4)
GLOBULIN SER CALC-MCNC: 2.2 GM/DL
GLUCOSE SERPL-MCNC: 92 MG/DL (ref 65–99)
GLUCOSE UR QL STRIP: NEGATIVE
HCT VFR BLD AUTO: 32.9 % (ref 37.5–51)
HDLC SERPL-MCNC: 40 MG/DL (ref 40–60)
HGB BLD-MCNC: 10.9 G/DL (ref 13–17.7)
HGB UR QL STRIP: NEGATIVE
IMM GRANULOCYTES # BLD AUTO: 0.02 10*3/MM3 (ref 0–0.05)
IMM GRANULOCYTES NFR BLD AUTO: 0.4 % (ref 0–0.5)
KETONES UR QL STRIP: NEGATIVE
LDLC SERPL CALC-MCNC: 45 MG/DL (ref 0–100)
LEUKOCYTE ESTERASE UR QL STRIP: NEGATIVE
LYMPHOCYTES # BLD AUTO: 0.92 10*3/MM3 (ref 0.7–3.1)
LYMPHOCYTES NFR BLD AUTO: 19 % (ref 19.6–45.3)
MCH RBC QN AUTO: 32.2 PG (ref 26.6–33)
MCHC RBC AUTO-ENTMCNC: 33.1 G/DL (ref 31.5–35.7)
MCV RBC AUTO: 97.1 FL (ref 79–97)
MONOCYTES # BLD AUTO: 0.52 10*3/MM3 (ref 0.1–0.9)
MONOCYTES NFR BLD AUTO: 10.7 % (ref 5–12)
NEUTROPHILS # BLD AUTO: 3.21 10*3/MM3 (ref 1.7–7)
NEUTROPHILS NFR BLD AUTO: 66.2 % (ref 42.7–76)
NITRITE UR QL STRIP: NEGATIVE
NRBC BLD AUTO-RTO: 0 /100 WBC (ref 0–0.2)
PH UR STRIP: 6 [PH] (ref 5–8)
PLATELET # BLD AUTO: 106 10*3/MM3 (ref 140–450)
POTASSIUM SERPL-SCNC: 4.4 MMOL/L (ref 3.5–5.2)
PROT SERPL-MCNC: 6.5 G/DL (ref 6–8.5)
PROT UR QL STRIP: ABNORMAL
PSA SERPL-MCNC: 0.43 NG/ML (ref 0–4)
RBC # BLD AUTO: 3.39 10*6/MM3 (ref 4.14–5.8)
RBC #/AREA URNS HPF: NORMAL /HPF
SODIUM SERPL-SCNC: 143 MMOL/L (ref 136–145)
SP GR UR STRIP: 1.01 (ref 1–1.03)
TRIGL SERPL-MCNC: 80 MG/DL (ref 0–150)
TSH SERPL DL<=0.005 MIU/L-ACNC: 2.83 UIU/ML (ref 0.27–4.2)
UROBILINOGEN UR STRIP-MCNC: ABNORMAL MG/DL
VLDLC SERPL CALC-MCNC: 16 MG/DL (ref 5–40)
WBC # BLD AUTO: 4.85 10*3/MM3 (ref 3.4–10.8)
WBC #/AREA URNS HPF: NORMAL /HPF

## 2023-02-03 ENCOUNTER — OFFICE VISIT (OUTPATIENT)
Dept: INTERNAL MEDICINE | Facility: CLINIC | Age: 73
End: 2023-02-03
Payer: MEDICARE

## 2023-02-03 VITALS
HEART RATE: 68 BPM | BODY MASS INDEX: 23.03 KG/M2 | TEMPERATURE: 97.1 F | SYSTOLIC BLOOD PRESSURE: 132 MMHG | DIASTOLIC BLOOD PRESSURE: 70 MMHG | OXYGEN SATURATION: 98 % | WEIGHT: 170 LBS | HEIGHT: 72 IN

## 2023-02-03 DIAGNOSIS — E55.9 VITAMIN D DEFICIENCY: ICD-10-CM

## 2023-02-03 DIAGNOSIS — H66.002 NON-RECURRENT ACUTE SUPPURATIVE OTITIS MEDIA OF LEFT EAR WITHOUT SPONTANEOUS RUPTURE OF TYMPANIC MEMBRANE: Primary | ICD-10-CM

## 2023-02-03 PROCEDURE — 99213 OFFICE O/P EST LOW 20 MIN: CPT

## 2023-02-03 RX ORDER — AMOXICILLIN AND CLAVULANATE POTASSIUM 500; 125 MG/1; MG/1
1 TABLET, FILM COATED ORAL 2 TIMES DAILY
Qty: 14 TABLET | Refills: 0 | Status: SHIPPED | OUTPATIENT
Start: 2023-02-03 | End: 2023-02-10

## 2023-02-03 NOTE — PROGRESS NOTES
Subjective     Chief Complaint:  Ear drainage    HPI:  Patient presents with complaint of ear drainage which has been present for approximately 2 weeks.  He reports that sometimes he notices the drainage on his pillow.  He states that it is brown in color.  He denies ear pain.  He does follow with Dr. Chen.  He has a PE tube in his right ear as well as known eustachian tube dysfunction of the left.  He thinks that he may need an antibiotic.  He does not have any other symptoms such as cough, sore throat, fever.    Past Medical History:   Past Medical History:   Diagnosis Date   • 3-vessel CAD 8/11/2020   • Allergic rhinitis    • Anxiety disorder 4/27/2020   • Arthritis    • Asymmetrical sensorineural hearing loss 6/28/2017   • Atherosclerosis of native artery of both lower extremities with intermittent claudication (Formerly [facility]) 7/18/2019   • Avascular necrosis of femoral head, left ([facility]) 07/11/2020    right hip after surgery   • Carotid stenosis    • Chronic mucoid otitis media    • Chronic rhinitis    • Coronary artery disease     HEART BYPASS 2004   • Crohn's disease of large intestine with other complication ([facility]) 7/30/2020    Chronic diarrhea Colonoscopy July 2020 revealed mild patchy scattered hemosiderin staining with inflammation more so in rectosigmoid area.  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   • Iron deficiency anemia 7/14/2020   • Mixed hearing loss of left ear    • PAD (peripheral artery disease) ([facility]) 8/11/2020   • Perianal abscess    • Pernicious anemia 08/17/2020    took shots but never diagnosed with b12 deficiency   • Personal history of alcoholism  ([facility]) 08/11/2020    quit drinking in 2013   • Prostatic hypertrophy 8/11/2020   • Sensorineural hearing loss    • Sepsis with acute renal failure ([facility]) 9/15/2020   • Shortness of breath 5/27/2021   • Tinnitus    • Ventricular tachycardia, nonsustained 7/14/2020   • Weight loss 7/11/2020     Past Surgical History:  Past Surgical History:   Procedure Laterality Date   • ARTERY SURGERY  2021    right side on neck   • CAROTID ENDARTERECTOMY Right 5/10/2021    Procedure: RIGHT CAROTID ENDARTERECTOMY WITH EEG;  Surgeon: Gil Pineda DO;  Location: [facility] HYBRID OR 12;  Service: Vascular;  Laterality: Right;   • COLONOSCOPY N/A 7/2/2020    Procedure: COLONOSCOPY WITH ANESTHESIA;  Surgeon: Adrien Brewster MD;  Location: [facility] ENDOSCOPY;  Service: Gastroenterology;  Laterality: N/A;  pre op: diarrhea  post op: polyps  PCP: Joe Velasco MD   • COLONOSCOPY N/A 10/13/2020    Procedure: COLONOSCOPY WITH ANESTHESIA;  Surgeon: Adrien Brewster MD;  Location: [facility] ENDOSCOPY;  Service: Gastroenterology;  Laterality: N/A;  Pre: Chronic Diarrhea, Crohn's  Post: AVM  Dr. Neftali Velasco  CO2 Inflation Used   • CORONARY ARTERY BYPASS GRAFT  2003    x3   • ENDOSCOPY N/A 11/2/2021    Procedure: ESOPHAGOGASTRODUODENOSCOPY WITH ANESTHESIA;  Surgeon: Bridger Bell MD;  Location: [facility] ENDOSCOPY;  Service: Gastroenterology;  Laterality: N/A;  pre anemia;gi bleed  post  gi bleed;schatski ring  Dr. ERIC Velasco   • EYE SURGERY Bilateral     catorac   • INCISION AND DRAINAGE PERIRECTAL ABSCESS N/A 3/3/2017    Procedure: INCISION AND DRAINAGE OF JEET ANAL ABSCESS;  Surgeon: Lynette Smith MD;  Location: [facility] OR;  Service:    • MYRINGOTOMY W/ TUBES Left 04/17/2017    06/10/2016   • TONSILLECTOMY     • TOTAL HIP ARTHROPLASTY Right 2006       Allergies:  Allergies   Allergen Reactions   • Ondansetron Anaphylaxis and GI Intolerance   • Zofran [Ondansetron Hcl] Anaphylaxis   • Lortab [Hydrocodone-Acetaminophen] Other (See  Comments) and Hallucinations     CLOSTROPHOBIC   • Allopurinol Other (See Comments)     Pain on right side     Medications:  Prior to Admission medications    Medication Sig Start Date End Date Taking? Authorizing Provider   albuterol sulfate  (90 Base) MCG/ACT inhaler USE 2 INHALATIONS FOUR TIMES A DAY AS NEEDED 7/12/21  Yes Tahira Mendez APRN   ALPRAZolam (XANAX) 0.25 MG tablet TAKE 1 TABLET BY MOUTH AT NIGHT AS NEEDED FOR ANXIETY 1/25/23  Yes Nathan Zimmer MD   amLODIPine (NORVASC) 10 MG tablet Take 1 tablet by mouth Daily. 7/25/22  Yes Trisha Soni   aspirin (aspirin) 81 MG EC tablet Take  by mouth Daily.   Yes ProviderTwyla MD   atorvastatin (LIPITOR) 10 MG tablet Take 1 tablet by mouth Daily. 9/23/22  Yes Eleuterio Farley MD   azelastine (ASTELIN) 0.1 % nasal spray USE 1 SPRAY IN EACH NOSTRIL TWICE A DAY AS NEEDED 2/8/22  Yes Tahira Mendez APRN   cholestyramine (QUESTRAN) 4 g packet Take 1 packet by mouth Daily. 9/13/22  Yes Adrien Brewster MD   cloNIDine (CATAPRES) 0.2 MG tablet Take 3 tablets by mouth Daily. 12/30/22  Yes Nathan Zimmer MD   clopidogrel (PLAVIX) 75 MG tablet TAKE 1 TABLET DAILY 7/12/22  Yes Elena Jon APRN   diphenhydrAMINE (BENADRYL) 25 mg capsule Take 25 mg by mouth Every 6 (Six) Hours As Needed for Itching.   Yes Twyla Guevara MD   fexofenadine (ALLEGRA) 180 MG tablet Take 180 mg by mouth Daily.   Yes Twyla Guevara MD   fluticasone (FLONASE) 50 MCG/ACT nasal spray 2 sprays into the nostril(s) as directed by provider Daily As Needed for Rhinitis.   Yes Twyla Guevara MD   furosemide (Lasix) 20 MG tablet Take 1 tablet by mouth 2 (Two) Times a Day. 12/30/22  Yes Nathan Zimmer MD   hydrALAZINE (APRESOLINE) 25 MG tablet Take 1 tablet by mouth 3 (Three) Times a Day.  Patient taking differently: Take 100 mg by mouth 3 (Three) Times a Day. 100mg daily 4/18/22  Yes Trisha Soni  "  levothyroxine (Synthroid) 75 MCG tablet Take 1 tablet by mouth Daily. 1/5/23  Yes Nathan Zimmer MD   magnesium chloride ER 64 MG DR tablet Take 143 mg by mouth Daily.   Yes Twyla Guevara MD   Melatonin 10 MG capsule Take 2 capsules by mouth Every Night.   Yes Twyla Guevara MD   mesalamine (APRISO) 0.375 g 24 hr capsule Take 4 capsules by mouth Daily. 9/13/22  Yes Adrien Brewster MD   metoprolol succinate XL (TOPROL-XL) 25 MG 24 hr tablet TAKE 1 TABLET DAILY 5/9/22  Yes Trisha Soni   montelukast (SINGULAIR) 10 MG tablet TAKE 1 TABLET BY MOUTH EVERY NIGHT 11/28/22  Yes Maria L Zambrano APRN   Multiple Vitamins-Minerals (PRESERVISION/LUTEIN) capsule Take 1 capsule by mouth 2 (two) times a day.   Yes Twyla Guevara MD   omeprazole (priLOSEC) 20 MG capsule TAKE 1 CAPSULE DAILY 2/8/22  Yes Tahira Mendez APRN   Oxymetazoline HCl (Nasal Spray) 0.05 % solution 2 squirts to each nostril twice a day x10 days 8/11/22  Yes Twyla Guevara MD   potassium chloride 10 MEQ CR tablet Take 1 tablet by mouth 2 (Two) Times a Day. 9/2/22  Yes Trisha Soni   sodium bicarbonate 650 MG tablet Take 0.5 tablets by mouth 2 (Two) Times a Day.  Patient taking differently: Take 325 mg by mouth 3 (Three) Times a Day. 11/3/21  Yes Dc Issa DO   valsartan (DIOVAN) 160 MG tablet Take 1 tablet by mouth Daily. 11/29/22  Yes Maria L Zambrano APRN   vitamin D (ERGOCALCIFEROL) 1.25 MG (90886 UT) capsule capsule Take 1 capsule by mouth 1 (One) Time Per Week. 9/2/22  Yes Trisha Soni   amoxicillin-clavulanate (Augmentin) 500-125 MG per tablet Take 1 tablet by mouth 2 (Two) Times a Day for 7 days. 2/3/23 2/10/23  Hamida Dexter APRN       Objective     Vital Signs: /70 (BP Location: Left arm, Patient Position: Sitting, Cuff Size: Adult)   Pulse 68   Temp 97.1 °F (36.2 °C) (Temporal)   Ht 182.9 cm (72\")   Wt 77.1 kg (170 lb)   SpO2 98%   BMI 23.06 kg/m² "   Physical Exam  Vitals and nursing note reviewed.   Constitutional:       General: He is not in acute distress.     Appearance: Normal appearance.   HENT:      Head: Normocephalic.      Right Ear: No drainage. No middle ear effusion. A PE tube is present.      Left Ear: Drainage present. Tympanic membrane is not erythematous.      Nose: No congestion or rhinorrhea.   Cardiovascular:      Rate and Rhythm: Normal rate.      Pulses: Normal pulses.      Heart sounds: Normal heart sounds. No murmur heard.  Pulmonary:      Effort: Pulmonary effort is normal. No respiratory distress.      Breath sounds: Normal breath sounds. No wheezing.   Abdominal:      General: Bowel sounds are normal. There is no distension.      Palpations: Abdomen is soft.      Tenderness: There is no abdominal tenderness.   Musculoskeletal:         General: No swelling or tenderness. Normal range of motion.      Cervical back: Normal range of motion.   Lymphadenopathy:      Cervical: No cervical adenopathy.   Skin:     General: Skin is warm and dry.      Capillary Refill: Capillary refill takes less than 2 seconds.      Findings: No bruising, lesion or rash.   Neurological:      Mental Status: He is alert and oriented to person, place, and time. Mental status is at baseline.      Motor: No weakness.   Psychiatric:         Mood and Affect: Mood normal.         Behavior: Behavior normal.         Thought Content: Thought content normal.         Judgment: Judgment normal.       Results Reviewed:  Reviewed note from last office visit.  Also reviewed note from last visit with ENT in November 2022.    Assessment / Plan     Assessment/Plan:  Diagnoses and all orders for this visit:    1. Non-recurrent acute suppurative otitis media of left ear without spontaneous rupture of tympanic membrane (Primary)  -     amoxicillin-clavulanate (Augmentin) 500-125 MG per tablet; Take 1 tablet by mouth 2 (Two) Times a Day for 7 days.  Dispense: 14 tablet; Refill:  0    2. Vitamin D deficiency  -     Vitamin D 25 hydroxy; Future       Will start Augmentin for 7 days at reduced dose of 500-125 mg due to renal function.  Instructed him to call if symptoms have not improved after he completes antibiotic therapy.    Patient did ask if he can be started back on his vitamin D supplement.  However, it appears that vitamin D that was ordered was not obtained with his other labs.  He states that he will be having labs drawn at the hospital soon.  Order has been placed for vitamin D to be obtained at that time.    Medicare wellness visit with Dr. Zimmer on 2/14/2023.    Return for Next scheduled follow up. unless patient needs to be seen sooner or acute issues arise.    I have discussed the patient results/orders and and plan/recommendation with them at today's visit.      Hamida Dexter, APRN   02/03/2023

## 2023-02-10 ENCOUNTER — LAB (OUTPATIENT)
Dept: LAB | Facility: HOSPITAL | Age: 73
End: 2023-02-10
Payer: MEDICARE

## 2023-02-10 ENCOUNTER — INFUSION (OUTPATIENT)
Dept: ONCOLOGY | Facility: HOSPITAL | Age: 73
End: 2023-02-10
Payer: MEDICARE

## 2023-02-10 VITALS
HEIGHT: 72 IN | TEMPERATURE: 97.6 F | OXYGEN SATURATION: 98 % | BODY MASS INDEX: 22.08 KG/M2 | WEIGHT: 163 LBS | RESPIRATION RATE: 20 BRPM | SYSTOLIC BLOOD PRESSURE: 152 MMHG | DIASTOLIC BLOOD PRESSURE: 52 MMHG | HEART RATE: 65 BPM

## 2023-02-10 DIAGNOSIS — D63.1 ANEMIA DUE TO STAGE 4 CHRONIC KIDNEY DISEASE: Primary | ICD-10-CM

## 2023-02-10 DIAGNOSIS — D63.1 ANEMIA DUE TO STAGE 3 CHRONIC KIDNEY DISEASE, UNSPECIFIED WHETHER STAGE 3A OR 3B CKD: ICD-10-CM

## 2023-02-10 DIAGNOSIS — E55.9 VITAMIN D DEFICIENCY: ICD-10-CM

## 2023-02-10 DIAGNOSIS — N18.4 ANEMIA DUE TO STAGE 4 CHRONIC KIDNEY DISEASE: Primary | ICD-10-CM

## 2023-02-10 DIAGNOSIS — N18.30 ANEMIA DUE TO STAGE 3 CHRONIC KIDNEY DISEASE, UNSPECIFIED WHETHER STAGE 3A OR 3B CKD: ICD-10-CM

## 2023-02-10 LAB
25(OH)D3 SERPL-MCNC: 56.5 NG/ML (ref 30–100)
DEPRECATED RDW RBC AUTO: 51.2 FL (ref 37–54)
ERYTHROCYTE [DISTWIDTH] IN BLOOD BY AUTOMATED COUNT: 14.6 % (ref 12.3–15.4)
HCT VFR BLD AUTO: 33.6 % (ref 37.5–51)
HGB BLD-MCNC: 10.6 G/DL (ref 13–17.7)
MCH RBC QN AUTO: 30.5 PG (ref 26.6–33)
MCHC RBC AUTO-ENTMCNC: 31.5 G/DL (ref 31.5–35.7)
MCV RBC AUTO: 96.8 FL (ref 79–97)
PLATELET # BLD AUTO: 100 10*3/MM3 (ref 140–450)
PMV BLD AUTO: 9.7 FL (ref 6–12)
RBC # BLD AUTO: 3.47 10*6/MM3 (ref 4.14–5.8)
WBC NRBC COR # BLD: 5.32 10*3/MM3 (ref 3.4–10.8)

## 2023-02-10 PROCEDURE — 85027 COMPLETE CBC AUTOMATED: CPT

## 2023-02-10 PROCEDURE — 96372 THER/PROPH/DIAG INJ SC/IM: CPT

## 2023-02-10 PROCEDURE — 25010000002 EPOETIN ALFA-EPBX 40000 UNIT/ML SOLUTION: Performed by: NURSE PRACTITIONER

## 2023-02-10 PROCEDURE — 36415 COLL VENOUS BLD VENIPUNCTURE: CPT

## 2023-02-10 PROCEDURE — 82306 VITAMIN D 25 HYDROXY: CPT

## 2023-02-10 RX ADMIN — EPOETIN ALFA-EPBX 40000 UNITS: 40000 INJECTION, SOLUTION INTRAVENOUS; SUBCUTANEOUS at 10:34

## 2023-02-13 DIAGNOSIS — E55.9 VITAMIN D DEFICIENCY: Primary | ICD-10-CM

## 2023-02-13 RX ORDER — ERGOCALCIFEROL 1.25 MG/1
50000 CAPSULE ORAL WEEKLY
Qty: 15 CAPSULE | Refills: 3 | Status: SHIPPED | OUTPATIENT
Start: 2023-02-13

## 2023-02-13 NOTE — PROGRESS NOTES
Vitamin D is in acceptable range at 56.5, may continue with current supplementation, refill has been sent.

## 2023-02-14 ENCOUNTER — OFFICE VISIT (OUTPATIENT)
Dept: INTERNAL MEDICINE | Facility: CLINIC | Age: 73
End: 2023-02-14
Payer: MEDICARE

## 2023-02-14 VITALS
SYSTOLIC BLOOD PRESSURE: 150 MMHG | BODY MASS INDEX: 22.21 KG/M2 | WEIGHT: 164 LBS | HEART RATE: 61 BPM | DIASTOLIC BLOOD PRESSURE: 58 MMHG | OXYGEN SATURATION: 99 % | HEIGHT: 72 IN | TEMPERATURE: 97.9 F

## 2023-02-14 DIAGNOSIS — R01.1 SYSTOLIC MURMUR: ICD-10-CM

## 2023-02-14 DIAGNOSIS — J01.90 ACUTE SINUSITIS, RECURRENCE NOT SPECIFIED, UNSPECIFIED LOCATION: Primary | ICD-10-CM

## 2023-02-14 DIAGNOSIS — N18.4 CKD (CHRONIC KIDNEY DISEASE) STAGE 4, GFR 15-29 ML/MIN: ICD-10-CM

## 2023-02-14 DIAGNOSIS — I10 HYPERTENSION, BENIGN: Chronic | ICD-10-CM

## 2023-02-14 PROBLEM — N30.00 ACUTE CYSTITIS: Status: RESOLVED | Noted: 2020-07-11 | Resolved: 2023-02-14

## 2023-02-14 PROBLEM — K92.2 GI BLEED: Status: RESOLVED | Noted: 2021-10-28 | Resolved: 2023-02-14

## 2023-02-14 PROBLEM — K52.9 ENTEROCOLITIS: Status: RESOLVED | Noted: 2020-09-16 | Resolved: 2023-02-14

## 2023-02-14 PROBLEM — Z01.818 PREOP TESTING: Status: RESOLVED | Noted: 2021-05-04 | Resolved: 2023-02-14

## 2023-02-14 PROBLEM — E87.1 HYPONATREMIA: Status: RESOLVED | Noted: 2020-07-11 | Resolved: 2023-02-14

## 2023-02-14 PROBLEM — A49.9 UTI (URINARY TRACT INFECTION), BACTERIAL: Status: RESOLVED | Noted: 2020-09-16 | Resolved: 2023-02-14

## 2023-02-14 PROBLEM — H90.A22 SENSORINEURAL HEARING LOSS (SNHL) OF LEFT EAR WITH RESTRICTED HEARING OF RIGHT EAR: Status: RESOLVED | Noted: 2020-07-24 | Resolved: 2023-02-14

## 2023-02-14 PROBLEM — N39.0 UTI (URINARY TRACT INFECTION), BACTERIAL: Status: RESOLVED | Noted: 2020-09-16 | Resolved: 2023-02-14

## 2023-02-14 PROBLEM — H69.92 EUSTACHIAN TUBE DYSFUNCTION, LEFT: Status: RESOLVED | Noted: 2022-09-08 | Resolved: 2023-02-14

## 2023-02-14 PROBLEM — Z01.818 PRE-OP EVALUATION: Status: RESOLVED | Noted: 2021-04-27 | Resolved: 2023-02-14

## 2023-02-14 PROBLEM — E87.29 METABOLIC ACIDOSIS, INCREASED ANION GAP: Status: RESOLVED | Noted: 2020-07-11 | Resolved: 2023-02-14

## 2023-02-14 PROBLEM — H69.82 EUSTACHIAN TUBE DYSFUNCTION, LEFT: Status: RESOLVED | Noted: 2022-09-08 | Resolved: 2023-02-14

## 2023-02-14 PROCEDURE — 1170F FXNL STATUS ASSESSED: CPT | Performed by: INTERNAL MEDICINE

## 2023-02-14 PROCEDURE — 1126F AMNT PAIN NOTED NONE PRSNT: CPT | Performed by: INTERNAL MEDICINE

## 2023-02-14 PROCEDURE — 99214 OFFICE O/P EST MOD 30 MIN: CPT | Performed by: INTERNAL MEDICINE

## 2023-02-14 PROCEDURE — 1159F MED LIST DOCD IN RCRD: CPT | Performed by: INTERNAL MEDICINE

## 2023-02-14 PROCEDURE — G0439 PPPS, SUBSEQ VISIT: HCPCS | Performed by: INTERNAL MEDICINE

## 2023-02-14 PROCEDURE — 96372 THER/PROPH/DIAG INJ SC/IM: CPT | Performed by: INTERNAL MEDICINE

## 2023-02-14 RX ORDER — AMOXICILLIN AND CLAVULANATE POTASSIUM 500; 125 MG/1; MG/1
1 TABLET, FILM COATED ORAL 2 TIMES DAILY
Qty: 14 TABLET | Refills: 0 | Status: SHIPPED | OUTPATIENT
Start: 2023-02-14 | End: 2023-02-28

## 2023-02-14 RX ORDER — DAPAGLIFLOZIN 10 MG/1
10 TABLET, FILM COATED ORAL DAILY
Qty: 30 TABLET | Refills: 1 | Status: SHIPPED | OUTPATIENT
Start: 2023-02-14 | End: 2023-02-22

## 2023-02-14 RX ORDER — FUROSEMIDE 20 MG/1
20 TABLET ORAL DAILY
Qty: 90 TABLET | Refills: 3
Start: 2023-02-14 | End: 2023-02-23 | Stop reason: SDUPTHER

## 2023-02-14 RX ORDER — METHYLPREDNISOLONE SODIUM SUCCINATE 40 MG/ML
40 INJECTION, POWDER, LYOPHILIZED, FOR SOLUTION INTRAMUSCULAR; INTRAVENOUS ONCE
Status: COMPLETED | OUTPATIENT
Start: 2023-02-14 | End: 2023-02-14

## 2023-02-14 RX ADMIN — METHYLPREDNISOLONE SODIUM SUCCINATE 40 MG: 40 INJECTION, POWDER, LYOPHILIZED, FOR SOLUTION INTRAMUSCULAR; INTRAVENOUS at 08:54

## 2023-02-14 NOTE — ASSESSMENT & PLAN NOTE
Results for orders placed during the hospital encounter of 10/05/22    Adult Transthoracic Echo Complete w/ Color, Spectral and Contrast if necessary per protocol    Interpretation Summary  · Left ventricular wall thickness is consistent with mild concentric hypertrophy. Sigmoid-shaped ventricular septum is present.  · Left ventricular ejection fraction appears to be 61 - 65%.  · Left ventricular diastolic function is consistent with (grade II w/high LAP) pseudonormalization.  · There are myxomatous changes of the mitral valve apparatus present with mild mitral annular calcification. There is mild mitral regurgitation.  · The left atrial cavity is moderately dilated.  · The right ventricle is mildly dilated with normal systolic function.  · The right atrial cavity is borderline dilated.  · Mild to moderate tricuspid valve regurgitation.  · Mild pulmonary hypertension with estimated right ventricular systolic pressure 45 mmHg.

## 2023-02-14 NOTE — PROGRESS NOTES
The ABCs of the Annual Wellness Visit  Subsequent Medicare Wellness Visit    Chief Complaint   Patient presents with   • Medicare Wellness-subsequent   • med refill     Amox-clav 500-125       Subjective    History of Present Illness:  Ricardo Hugo is a 72 y.o. male who presents for a Subsequent Medicare Wellness Visit.    The following portions of the patient's history were reviewed and   updated as appropriate: allergies, current medications, past family history, past medical history, past social history, past surgical history and problem list.    Compared to one year ago, the patient feels his physical   health is the same.    Compared to one year ago, the patient feels his mental   health is the same.    Recent Hospitalizations:  He was not admitted to the hospital during the last year.       Current Medical Providers:  Patient Care Team:  Nathan Zimmer MD as PCP - General (Internal Medicine)  Adrien Brewster MD as Consulting Physician (Gastroenterology)  Eleuterio Farley MD as Cardiologist (Cardiology)  Elena Jon APRN as Referring Physician (Vascular Surgery)  Bolivar Rueda MD as Consulting Physician (Nephrology)  Slava Tate APRN as Nurse Practitioner (Family Medicine)  New Omalley MD as Consulting Physician (Pulmonary Disease)  Becky Camacho APRN as Nurse Practitioner (Hematology and Oncology)    Outpatient Medications Prior to Visit   Medication Sig Dispense Refill   • albuterol sulfate  (90 Base) MCG/ACT inhaler USE 2 INHALATIONS FOUR TIMES A DAY AS NEEDED 20.1 g 3   • ALPRAZolam (XANAX) 0.25 MG tablet TAKE 1 TABLET BY MOUTH AT NIGHT AS NEEDED FOR ANXIETY 30 tablet 2   • amLODIPine (NORVASC) 10 MG tablet Take 1 tablet by mouth Daily. 90 tablet 3   • aspirin (aspirin) 81 MG EC tablet Take  by mouth Daily.     • atorvastatin (LIPITOR) 10 MG tablet Take 1 tablet by mouth Daily. 90 tablet 3   • azelastine (ASTELIN) 0.1 % nasal spray USE  1 SPRAY IN EACH NOSTRIL TWICE A DAY AS NEEDED 90 mL 1   • cholestyramine (QUESTRAN) 4 g packet Take 1 packet by mouth Daily. 90 packet 3   • cloNIDine (CATAPRES) 0.2 MG tablet Take 3 tablets by mouth Daily. 270 tablet 3   • clopidogrel (PLAVIX) 75 MG tablet TAKE 1 TABLET DAILY 90 tablet 3   • diphenhydrAMINE (BENADRYL) 25 mg capsule Take 25 mg by mouth Every 6 (Six) Hours As Needed for Itching.     • fexofenadine (ALLEGRA) 180 MG tablet Take 180 mg by mouth Daily.     • fluticasone (FLONASE) 50 MCG/ACT nasal spray 2 sprays into the nostril(s) as directed by provider Daily As Needed for Rhinitis.     • hydrALAZINE (APRESOLINE) 25 MG tablet Take 1 tablet by mouth 3 (Three) Times a Day. (Patient taking differently: Take 100 mg by mouth 3 (Three) Times a Day. 100mg daily) 90 tablet 5   • levothyroxine (Synthroid) 75 MCG tablet Take 1 tablet by mouth Daily. 90 tablet 3   • magnesium chloride ER 64 MG DR tablet Take 143 mg by mouth Daily.     • Melatonin 10 MG capsule Take 2 capsules by mouth Every Night.     • mesalamine (APRISO) 0.375 g 24 hr capsule Take 4 capsules by mouth Daily. 360 capsule 3   • metoprolol succinate XL (TOPROL-XL) 25 MG 24 hr tablet TAKE 1 TABLET DAILY 90 tablet 3   • montelukast (SINGULAIR) 10 MG tablet TAKE 1 TABLET BY MOUTH EVERY NIGHT 30 tablet 11   • Multiple Vitamins-Minerals (PRESERVISION/LUTEIN) capsule Take 1 capsule by mouth 2 (two) times a day.     • omeprazole (priLOSEC) 20 MG capsule TAKE 1 CAPSULE DAILY 90 capsule 1   • Oxymetazoline HCl (Nasal Spray) 0.05 % solution 2 squirts to each nostril twice a day x10 days     • potassium chloride 10 MEQ CR tablet Take 1 tablet by mouth 2 (Two) Times a Day. 180 tablet 3   • sodium bicarbonate 650 MG tablet Take 0.5 tablets by mouth 2 (Two) Times a Day. (Patient taking differently: Take 325 mg by mouth 3 (Three) Times a Day.) 180 tablet 3   • valsartan (DIOVAN) 160 MG tablet Take 1 tablet by mouth Daily. 90 tablet 3   • vitamin D (ERGOCALCIFEROL)  1.25 MG (25737 UT) capsule capsule Take 1 capsule by mouth 1 (One) Time Per Week. 15 capsule 3   • furosemide (Lasix) 20 MG tablet Take 1 tablet by mouth 2 (Two) Times a Day. 180 tablet 3     No facility-administered medications prior to visit.       No opioid medication identified on active medication list. I have reviewed chart for other potential  high risk medication/s and harmful drug interactions in the elderly.          Aspirin is on active medication list. Aspirin use is indicated based on review of current medical condition/s. Pros and cons of this therapy have been discussed today. Benefits of this medication outweigh potential harm.  Patient has been encouraged to continue taking this medication.  .      Patient Active Problem List   Diagnosis   • Chronic rhinitis   • Acute renal failure superimposed on stage 4 chronic kidney disease (HCC)   • Moderate malnutrition ([facility])   • Hypertension, benign   • Sepsis ([facility])   • Chronic diarrhea   • Crohn's disease of large intestine with other complication ([facility])   • Symptomatic anemia   • CKD (chronic kidney disease) stage 4, GFR 15-29 ml/min ([facility])   • Bruit of right carotid artery   • Wellness examination   • PAD (peripheral artery disease) ([facility])   • IHD (ischemic heart disease)   • Carotid stenosis   • Stenosis of right carotid artery   • Carotid stenosis, right   • Avascular necrosis of bone of hip (HCC)   • Diastolic dysfunction   • Shortness of breath   • CRD (chronic renal disease), stage IV (HCC)   • Thrombocytopenia (HCC)   • History of alcoholism ([facility])   • Anemia due to chronic kidney disease   • Copper deficiency   • Low iron    • CLL (chronic lymphocytic leukemia) ([facility])   • Pulmonary hypertension (HCC)   • Perforation of left tympanic membrane   • Mixed hyperlipidemia   • Systolic murmur     Advance Care Planning  Advance Directive is not on file.  ACP discussion was held with the patient during this visit. Patient has an advance directive (not in EMR),  "copy requested.       Objective    Vitals:    23 0828   BP: 150/58   BP Location: Left arm   Patient Position: Sitting   Cuff Size: Adult   Pulse: 61   Temp: 97.9 °F (36.6 °C)   TempSrc: Temporal   SpO2: 99%   Weight: 74.4 kg (164 lb)   Height: 182.9 cm (72\")   PainSc: 0-No pain     Estimated body mass index is 22.24 kg/m² as calculated from the following:    Height as of this encounter: 182.9 cm (72\").    Weight as of this encounter: 74.4 kg (164 lb).    BMI is within normal parameters. No other follow-up for BMI required.      Does the patient have evidence of cognitive impairment? No      Lab Results   Component Value Date    CHLPL 101 2023    TRIG 80 2023    HDL 40 2023    LDL 45 2023    VLDL 16 2023            HEALTH RISK ASSESSMENT    Smoking Status:  Social History     Tobacco Use   Smoking Status Former   • Packs/day: 0.50   • Years: 25.00   • Pack years: 12.50   • Types: Cigarettes   • Start date:    • Quit date: 10/13/2013   • Years since quittin.3   Smokeless Tobacco Never   Tobacco Comments    quit 2013     Alcohol Consumption:  Social History     Substance and Sexual Activity   Alcohol Use Not Currently     Fall Risk Screen:    ALEXEI Fall Risk Assessment was completed, and patient is at LOW risk for falls.Assessment completed on:2023    Depression Screening:  PHQ-2/PHQ-9 Depression Screening 2023   Retired PHQ-9 Total Score -   Retired Total Score -   Little Interest or Pleasure in Doing Things 0-->not at all   Feeling Down, Depressed or Hopeless 0-->not at all   PHQ-9: Brief Depression Severity Measure Score 0       Health Habits and Functional and Cognitive Screening:  Functional & Cognitive Status 2023   Do you have difficulty preparing food and eating? No   Do you have difficulty bathing yourself, getting dressed or grooming yourself? No   Do you have difficulty using the toilet? No   Do you have difficulty moving around from place to " place? No   Do you have trouble with steps or getting out of a bed or a chair? No   Current Diet Well Balanced Diet   Dental Exam Up to date   Eye Exam Up to date   Exercise (times per week) -   Current Exercises Include No Regular Exercise   Current Exercise Activities Include -   Do you need help using the phone?  No   Are you deaf or do you have serious difficulty hearing?  No   Do you need help with transportation? No   Do you need help shopping? No   Do you need help preparing meals?  No   Do you need help with housework?  No   Do you need help with laundry? No   Do you need help taking your medications? No   Do you need help managing money? No   Do you ever drive or ride in a car without wearing a seat belt? No   Have you felt unusual stress, anger or loneliness in the last month? No   Who do you live with? Alone   If you need help, do you have trouble finding someone available to you? No   Have you been bothered in the last four weeks by sexual problems? No   Do you have difficulty concentrating, remembering or making decisions? No       Age-appropriate Screening Schedule:  Refer to the list below for future screening recommendations based on patient's age, sex and/or medical conditions. Orders for these recommended tests are listed in the plan section. The patient has been provided with a written plan.    Health Maintenance   Topic Date Due   • TDAP/TD VACCINES (1 - Tdap) Never done   • LIPID PANEL  01/27/2024   • INFLUENZA VACCINE  Completed   • ZOSTER VACCINE  Completed              Assessment & Plan   CMS Preventative Services Quick Reference  Risk Factors Identified During Encounter  Hearing Problem: Follows with ENT, has perforated left ear drum; tube in right ear  Patients other risk factors including advancement of his chronic kidney disease.  The above risks/problems have been discussed with the patient.  Follow up actions/plans if indicated are seen below in the Assessment/Plan Section.  Pertinent  "information has been shared with the patient in the After Visit Summary.      Follow Up:   Return in about 3 months (around 5/14/2023), or if symptoms worsen or fail to improve, for Recheck.     An After Visit Summary and PPPS were made available to the patient.                   MAHENDRA Zimmer MD, FACP, [facility]      Electronically signed by Nathan Zimmer MD, 02/14/23, 8:40 AM CST.    Additional E&M Note during same encounter follows:  Patient has multiple medical problems which are significant and separately identifiable that require additional work above and beyond the Medicare Wellness Visit.      Chief Complaint  Medicare Wellness-subsequent and med refill (Amox-clav 500-125 )    Subjective        HPI  Ricardo Hugo is also being seen today for chronic kidney disease.  Reached out to Slava KATZ.  Patients eGFR is >25 on average over the year, per data would benefit possible from Farxiga.  Nephrology APRN was okay with trialing the medication.  Will cut back his diuretic to 20 mg furosemide daily instead of twice daily.    BP a little elevated, farxiga may help with this.  Will hold on further medication adjustment at present time.      Objective   Vitals:    02/14/23 0828   BP: 150/58   BP Location: Left arm   Patient Position: Sitting   Cuff Size: Adult   Pulse: 61   Temp: 97.9 °F (36.6 °C)   TempSrc: Temporal   SpO2: 99%   Weight: 74.4 kg (164 lb)   Height: 182.9 cm (72\")   PainSc: 0-No pain     Physical Exam  Vitals reviewed.   Constitutional:       Appearance: He is not ill-appearing.   HENT:      Head: Normocephalic and atraumatic.      Right Ear: A PE tube is present.      Left Ear: Tympanic membrane is perforated.      Mouth/Throat:      Mouth: Mucous membranes are dry.      Pharynx: Oropharynx is clear.   Eyes:      General: No scleral icterus.     Conjunctiva/sclera: Conjunctivae normal.   Cardiovascular:      Rate and Rhythm: Normal rate and regular rhythm.      Heart sounds: Murmur " heard.   Pulmonary:      Effort: Pulmonary effort is normal. No respiratory distress.   Musculoskeletal:      Right lower leg: No edema.      Left lower leg: No edema.   Skin:     General: Skin is warm and dry.   Neurological:      Mental Status: He is alert.   Psychiatric:         Mood and Affect: Mood normal.         Behavior: Behavior normal.           The following data was reviewed by: Nathan Zimmer MD on 02/14/2023:  CMP    CMP 11/18/22 1/13/23 1/27/23   Glucose 116 (A) 134 (A) 92   BUN 57 (A) 39 (A) 42 (A)   Creatinine 2.44 (A) 2.67 (A) 2.54 (A)   eGFR 27.1 (A) 24.3 (A)    Sodium 141 138 143   Potassium 4.5 4.1 4.4   Chloride 109 (A) 103 108 (A)   Calcium 9.1 8.6 8.8   Total Protein   6.5   Total Protein 7.0 6.8    Albumin 4.30 4.3 4.3   Globulin   2.2   Globulin 2.7 2.5    Total Bilirubin 0.3 0.4 0.4   Alkaline Phosphatase 172 (A) 160 (A) 156 (A)   AST (SGOT) 19 20 20   ALT (SGPT) 20 21 19   Albumin/Globulin Ratio 1.6 1.7    BUN/Creatinine Ratio 23.4 14.6 16.5   Anion Gap 13.0 15.0    (A) Abnormal value       Comments are available for some flowsheets but are not being displayed.           CBC    CBC 1/13/23 1/27/23 2/10/23   WBC 6.57 4.85 5.32   RBC 3.41 (A) 3.39 (A) 3.47 (A)   Hemoglobin 10.1 (A) 10.9 (A) 10.6 (A)   Hematocrit 33.1 (A) 32.9 (A) 33.6 (A)   MCV 97.1 (A) 97.1 (A) 96.8   MCH 29.6 32.2 30.5   MCHC 30.5 (A) 33.1 31.5   RDW 14.1 14.3 14.6   Platelets 112 (A) 106 (A) 100 (A)   (A) Abnormal value            Lipid Panel    Lipid Panel 1/27/23   Total Cholesterol 101   Triglycerides 80   HDL Cholesterol 40   VLDL Cholesterol 16   LDL Cholesterol  45      Comments are available for some flowsheets but are not being displayed.           TSH    TSH 1/27/23   TSH 2.830                      Assessment and Plan   Diagnoses and all orders for this visit:    1. Acute sinusitis, recurrence not specified, unspecified location (Primary)  -     amoxicillin-clavulanate (AUGMENTIN) 500-125 MG per tablet; Take  "1 tablet by mouth 2 (Two) Times a Day.  Dispense: 14 tablet; Refill: 0  -     methylPREDNISolone sodium succinate (SOLU-Medrol) injection 40 mg    2. CKD (chronic kidney disease) stage 4, GFR 15-29 ml/min (HCC)  -     Microalbumin / Creatinine Urine Ratio - Urine, Clean Catch  -     Protein / Creatinine Ratio, Urine - Urine, Clean Catch  -     Basic Metabolic Panel; Future  -     dapagliflozin Propanediol (Farxiga) 10 MG tablet; Take 10 mg by mouth Daily.  Dispense: 30 tablet; Refill: 1  -     furosemide (Lasix) 20 MG tablet; Take 1 tablet by mouth Daily.  Dispense: 90 tablet; Refill: 3    3. Systolic murmur  Assessment & Plan:  Results for orders placed during the hospital encounter of 10/05/22    Adult Transthoracic Echo Complete w/ Color, Spectral and Contrast if necessary per protocol    Interpretation Summary  · Left ventricular wall thickness is consistent with mild concentric hypertrophy. Sigmoid-shaped ventricular septum is present.  · Left ventricular ejection fraction appears to be 61 - 65%.  · Left ventricular diastolic function is consistent with (grade II w/high LAP) pseudonormalization.  · There are myxomatous changes of the mitral valve apparatus present with mild mitral annular calcification. There is mild mitral regurgitation.  · The left atrial cavity is moderately dilated.  · The right ventricle is mildly dilated with normal systolic function.  · The right atrial cavity is borderline dilated.  · Mild to moderate tricuspid valve regurgitation.  · Mild pulmonary hypertension with estimated right ventricular systolic pressure 45 mmHg.         4. Hypertension, benign  Assessment & Plan:  Will hold on adjustment until after initiation of Farxiga.          Patient information sheet given on Farxiga.  Patient reviewed and returned to clinic indicating that he would like to proceed forward with the medication.  The Hannahtow et all \"Effects of Dapagliflozin in Stage 4 Chronic Kidney Disease\" concluded that " results in these patient are consistent with initial results from DAPA-CKD trial without evidence of increased risks.      Case discussed with Slava KATZ briefly, he is okay with trial of Farxiga.         Follow Up   Return in about 3 months (around 5/14/2023), or if symptoms worsen or fail to improve, for Recheck.  Patient was given instructions and counseling regarding his condition or for health maintenance advice. Please see specific information pulled into the AVS if appropriate.

## 2023-02-15 ENCOUNTER — TELEPHONE (OUTPATIENT)
Dept: INTERNAL MEDICINE | Facility: CLINIC | Age: 73
End: 2023-02-15
Payer: MEDICARE

## 2023-02-15 LAB
CREAT UR-MCNC: 58.3 MG/DL
PROT UR-MCNC: 194.9 MG/DL
PROT/CREAT UR: 3343.1 MG/G CREA (ref 0–200)

## 2023-02-15 NOTE — TELEPHONE ENCOUNTER
LVM informing patient that  has denied coverage for Farxiga and there is not another similar medication, at this time,  to use as an alternative.  Told him to call if questions or concerns about this information.

## 2023-02-17 RX ORDER — POTASSIUM CHLORIDE 750 MG/1
10 TABLET, FILM COATED, EXTENDED RELEASE ORAL 2 TIMES DAILY
Qty: 180 TABLET | Refills: 3 | Status: SHIPPED | OUTPATIENT
Start: 2023-02-17

## 2023-02-22 ENCOUNTER — TELEPHONE (OUTPATIENT)
Dept: INTERNAL MEDICINE | Facility: CLINIC | Age: 73
End: 2023-02-22
Payer: MEDICARE

## 2023-02-22 ENCOUNTER — CLINICAL SUPPORT (OUTPATIENT)
Dept: INTERNAL MEDICINE | Facility: CLINIC | Age: 73
End: 2023-02-22
Payer: MEDICARE

## 2023-02-22 DIAGNOSIS — N18.4 CKD (CHRONIC KIDNEY DISEASE) STAGE 4, GFR 15-29 ML/MIN: ICD-10-CM

## 2023-02-22 DIAGNOSIS — I27.20 PULMONARY HYPERTENSION: Primary | ICD-10-CM

## 2023-02-22 NOTE — TELEPHONE ENCOUNTER
Pt dropped of BP readings today.  At last visit, you decreased his Lasix 20mg from bid to qd due to CKD and noted that you were not going to make other adjustments until Farxiga was started, but insurance did not cover Farxiga.

## 2023-02-22 NOTE — PROGRESS NOTES
Patient presented today to have BP wrist cuff com paired to our manual readings.       Patients cuff reader 186/64 pulse 59    Our reading : 178/56 pulse 55

## 2023-02-23 RX ORDER — FUROSEMIDE 20 MG/1
20 TABLET ORAL 2 TIMES DAILY
Qty: 180 TABLET | Refills: 3
Start: 2023-02-23

## 2023-02-28 ENCOUNTER — OFFICE VISIT (OUTPATIENT)
Dept: CARDIOLOGY | Facility: CLINIC | Age: 73
End: 2023-02-28
Payer: MEDICARE

## 2023-02-28 VITALS
WEIGHT: 164 LBS | SYSTOLIC BLOOD PRESSURE: 160 MMHG | HEIGHT: 72 IN | HEART RATE: 55 BPM | BODY MASS INDEX: 22.21 KG/M2 | DIASTOLIC BLOOD PRESSURE: 61 MMHG

## 2023-02-28 DIAGNOSIS — E78.2 MIXED HYPERLIPIDEMIA: ICD-10-CM

## 2023-02-28 DIAGNOSIS — I25.9 IHD (ISCHEMIC HEART DISEASE): Primary | ICD-10-CM

## 2023-02-28 DIAGNOSIS — I10 RESISTANT HYPERTENSION: ICD-10-CM

## 2023-02-28 DIAGNOSIS — I51.89 DIASTOLIC DYSFUNCTION: ICD-10-CM

## 2023-02-28 DIAGNOSIS — I73.9 PAD (PERIPHERAL ARTERY DISEASE): ICD-10-CM

## 2023-02-28 DIAGNOSIS — I10 HYPERTENSION, BENIGN: Chronic | ICD-10-CM

## 2023-02-28 PROBLEM — I1A.0 RESISTANT HYPERTENSION: Status: ACTIVE | Noted: 2020-09-08

## 2023-02-28 PROCEDURE — 99214 OFFICE O/P EST MOD 30 MIN: CPT | Performed by: INTERNAL MEDICINE

## 2023-02-28 PROCEDURE — 93000 ELECTROCARDIOGRAM COMPLETE: CPT | Performed by: INTERNAL MEDICINE

## 2023-02-28 RX ORDER — CARVEDILOL 25 MG/1
25 TABLET ORAL 2 TIMES DAILY
Qty: 60 TABLET | Refills: 11 | Status: SHIPPED | OUTPATIENT
Start: 2023-02-28 | End: 2023-03-27

## 2023-02-28 RX ORDER — CALCIUM CARBONATE/VITAMIN D3 500-10/5ML
LIQUID (ML) ORAL
COMMUNITY

## 2023-03-10 ENCOUNTER — INFUSION (OUTPATIENT)
Dept: ONCOLOGY | Facility: HOSPITAL | Age: 73
End: 2023-03-10
Payer: MEDICARE

## 2023-03-10 ENCOUNTER — LAB (OUTPATIENT)
Dept: LAB | Facility: HOSPITAL | Age: 73
End: 2023-03-10
Payer: MEDICARE

## 2023-03-10 VITALS
HEART RATE: 51 BPM | OXYGEN SATURATION: 99 % | RESPIRATION RATE: 18 BRPM | WEIGHT: 164 LBS | BODY MASS INDEX: 22.21 KG/M2 | HEIGHT: 72 IN | DIASTOLIC BLOOD PRESSURE: 46 MMHG | TEMPERATURE: 97.6 F | SYSTOLIC BLOOD PRESSURE: 138 MMHG

## 2023-03-10 DIAGNOSIS — N18.30 ANEMIA DUE TO STAGE 3 CHRONIC KIDNEY DISEASE, UNSPECIFIED WHETHER STAGE 3A OR 3B CKD: Primary | ICD-10-CM

## 2023-03-10 DIAGNOSIS — D63.1 ANEMIA DUE TO STAGE 3 CHRONIC KIDNEY DISEASE, UNSPECIFIED WHETHER STAGE 3A OR 3B CKD: Primary | ICD-10-CM

## 2023-03-10 DIAGNOSIS — D63.1 ANEMIA DUE TO STAGE 4 CHRONIC KIDNEY DISEASE: Primary | ICD-10-CM

## 2023-03-10 DIAGNOSIS — N18.4 ANEMIA DUE TO STAGE 4 CHRONIC KIDNEY DISEASE: Primary | ICD-10-CM

## 2023-03-10 LAB
DEPRECATED RDW RBC AUTO: 53.7 FL (ref 37–54)
ERYTHROCYTE [DISTWIDTH] IN BLOOD BY AUTOMATED COUNT: 14.4 % (ref 12.3–15.4)
HCT VFR BLD AUTO: 32 % (ref 37.5–51)
HGB BLD-MCNC: 9.6 G/DL (ref 13–17.7)
HOLD SPECIMEN: NORMAL
MCH RBC QN AUTO: 30.2 PG (ref 26.6–33)
MCHC RBC AUTO-ENTMCNC: 30 G/DL (ref 31.5–35.7)
MCV RBC AUTO: 100.6 FL (ref 79–97)
PLATELET # BLD AUTO: 105 10*3/MM3 (ref 140–450)
PMV BLD AUTO: 10.2 FL (ref 6–12)
RBC # BLD AUTO: 3.18 10*6/MM3 (ref 4.14–5.8)
WBC NRBC COR # BLD: 4.89 10*3/MM3 (ref 3.4–10.8)

## 2023-03-10 PROCEDURE — 96372 THER/PROPH/DIAG INJ SC/IM: CPT

## 2023-03-10 PROCEDURE — 36415 COLL VENOUS BLD VENIPUNCTURE: CPT

## 2023-03-10 PROCEDURE — 25010000002 EPOETIN ALFA-EPBX 40000 UNIT/ML SOLUTION: Performed by: NURSE PRACTITIONER

## 2023-03-10 PROCEDURE — 85027 COMPLETE CBC AUTOMATED: CPT

## 2023-03-10 RX ADMIN — EPOETIN ALFA-EPBX 40000 UNITS: 40000 INJECTION, SOLUTION INTRAVENOUS; SUBCUTANEOUS at 08:56

## 2023-03-16 ENCOUNTER — OFFICE VISIT (OUTPATIENT)
Dept: INTERNAL MEDICINE | Facility: CLINIC | Age: 73
End: 2023-03-16
Payer: MEDICARE

## 2023-03-16 VITALS
HEIGHT: 72 IN | SYSTOLIC BLOOD PRESSURE: 144 MMHG | WEIGHT: 168 LBS | BODY MASS INDEX: 22.75 KG/M2 | HEART RATE: 58 BPM | OXYGEN SATURATION: 97 % | TEMPERATURE: 96.9 F | DIASTOLIC BLOOD PRESSURE: 68 MMHG

## 2023-03-16 DIAGNOSIS — J01.41 ACUTE RECURRENT PANSINUSITIS: Primary | ICD-10-CM

## 2023-03-16 DIAGNOSIS — J31.0 CHRONIC RHINITIS: ICD-10-CM

## 2023-03-16 PROCEDURE — 1159F MED LIST DOCD IN RCRD: CPT | Performed by: NURSE PRACTITIONER

## 2023-03-16 PROCEDURE — 3078F DIAST BP <80 MM HG: CPT | Performed by: NURSE PRACTITIONER

## 2023-03-16 PROCEDURE — 1160F RVW MEDS BY RX/DR IN RCRD: CPT | Performed by: NURSE PRACTITIONER

## 2023-03-16 PROCEDURE — 3077F SYST BP >= 140 MM HG: CPT | Performed by: NURSE PRACTITIONER

## 2023-03-16 PROCEDURE — 99213 OFFICE O/P EST LOW 20 MIN: CPT | Performed by: NURSE PRACTITIONER

## 2023-03-16 RX ORDER — CEFDINIR 300 MG/1
300 CAPSULE ORAL DAILY
Qty: 10 CAPSULE | Refills: 0 | Status: SHIPPED | OUTPATIENT
Start: 2023-03-16

## 2023-03-16 NOTE — PROGRESS NOTES
"        Subjective     Chief Complaint:  ***    HPI:  Patient presents today with complaints of feeling \"stopped up\".  He was seen by Dr. Zimmer on 2/14/2023 and received Augmentin as well as a Solu-Medrol injection for acute sinusitis.    Past Medical History:   Past Medical History:   Diagnosis Date   • 3-vessel CAD 8/11/2020   • Allergic rhinitis    • Anxiety disorder 4/27/2020   • Arthritis    • Asymmetrical sensorineural hearing loss 6/28/2017   • Atherosclerosis of native artery of both lower extremities with intermittent claudication ([facility]) 7/18/2019   • Avascular necrosis of femoral head, left ([facility]) 07/11/2020    right hip after surgery   • Carotid stenosis    • Chronic mucoid otitis media    • Chronic rhinitis    • Coronary artery disease     HEART BYPASS 2004   • Crohn's disease of large intestine with other complication ([facility]) 7/30/2020    Chronic diarrhea Colonoscopy July 2020 revealed mild patchy scattered hemosiderin staining with inflammation more so in rectosigmoid area.  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   • Iron deficiency anemia 7/14/2020   • Mixed hearing loss of left ear    • PAD (peripheral artery disease) ([facility]) 8/11/2020   • Perianal abscess    • Pernicious anemia 08/17/2020    took shots but never diagnosed with b12 deficiency   • Personal history of alcoholism ([facility]) 08/11/2020    quit drinking in 2013   • Prostatic hypertrophy 8/11/2020   • Sensorineural hearing loss    • Sepsis with acute renal failure ([facility]) 9/15/2020   • Shortness of breath 5/27/2021   • Tinnitus    • Ventricular tachycardia, nonsustained 7/14/2020   • Weight loss 7/11/2020 "     Past Surgical History:  Past Surgical History:   Procedure Laterality Date   • ARTERY SURGERY  2021    right side on neck   • CAROTID ENDARTERECTOMY Right 5/10/2021    Procedure: RIGHT CAROTID ENDARTERECTOMY WITH EEG;  Surgeon: Gil Pineda DO;  Location: [facility] HYBRID OR 12;  Service: Vascular;  Laterality: Right;   • COLONOSCOPY N/A 7/2/2020    Procedure: COLONOSCOPY WITH ANESTHESIA;  Surgeon: Adrien Brewster MD;  Location: [facility] ENDOSCOPY;  Service: Gastroenterology;  Laterality: N/A;  pre op: diarrhea  post op: polyps  PCP: Joe Velasco MD   • COLONOSCOPY N/A 10/13/2020    Procedure: COLONOSCOPY WITH ANESTHESIA;  Surgeon: Adrien Brewster MD;  Location: [facility] ENDOSCOPY;  Service: Gastroenterology;  Laterality: N/A;  Pre: Chronic Diarrhea, Crohn's  Post: AVM  Dr. Neftali Velasco  CO2 Inflation Used   • CORONARY ARTERY BYPASS GRAFT  2003    x3   • ENDOSCOPY N/A 11/2/2021    Procedure: ESOPHAGOGASTRODUODENOSCOPY WITH ANESTHESIA;  Surgeon: Bridger Bell MD;  Location: [facility] ENDOSCOPY;  Service: Gastroenterology;  Laterality: N/A;  pre anemia;gi bleed  post  gi bleed;schatski ring  Dr. ERIC Velasco   • EYE SURGERY Bilateral     catorac   • INCISION AND DRAINAGE PERIRECTAL ABSCESS N/A 3/3/2017    Procedure: INCISION AND DRAINAGE OF JEET ANAL ABSCESS;  Surgeon: Lynette Smith MD;  Location: [facility] OR;  Service:    • MYRINGOTOMY W/ TUBES Left 04/17/2017    06/10/2016   • TONSILLECTOMY     • TOTAL HIP ARTHROPLASTY Right 2006       Allergies:  Allergies   Allergen Reactions   • Ondansetron Anaphylaxis and GI Intolerance   • Zofran [Ondansetron Hcl] Anaphylaxis   • Lortab [Hydrocodone-Acetaminophen] Other (See Comments) and Hallucinations     CLOSTROPHOBIC   • Allopurinol Other (See Comments)     Pain on right side     Medications:  Prior to Admission medications    Medication Sig Start Date End Date Taking? Authorizing Provider   albuterol sulfate  (90 Base) MCG/ACT inhaler USE 2 INHALATIONS  FOUR TIMES A DAY AS NEEDED 7/12/21   Tahira Mendez APRN   ALPRAZolam (XANAX) 0.25 MG tablet TAKE 1 TABLET BY MOUTH AT NIGHT AS NEEDED FOR ANXIETY 1/25/23   Nathan Zimmer MD   amLODIPine (NORVASC) 10 MG tablet Take 1 tablet by mouth Daily. 7/25/22   Trisha Soni   aspirin (aspirin) 81 MG EC tablet Take  by mouth Daily.    Twyla Guevara MD   atorvastatin (LIPITOR) 10 MG tablet Take 1 tablet by mouth Daily. 9/23/22   Eleuterio Farley MD   azelastine (ASTELIN) 0.1 % nasal spray USE 1 SPRAY IN EACH NOSTRIL TWICE A DAY AS NEEDED 2/8/22   Tahira Mendez APRN   carvedilol (COREG) 25 MG tablet Take 1 tablet by mouth 2 (Two) Times a Day. 2/28/23   Eleuterio Farley MD   cholestyramine (QUESTRAN) 4 g packet Take 1 packet by mouth Daily. 9/13/22   Adrien Brewster MD   cloNIDine (CATAPRES) 0.2 MG tablet Take 3 tablets by mouth Daily. 12/30/22   Nathan Zimmer MD   clopidogrel (PLAVIX) 75 MG tablet TAKE 1 TABLET DAILY 7/12/22   Elena Jon APRN   Copper Gluconate (Copper Caps) 2 MG capsule Take  by mouth.    Twyla Guevara MD   diphenhydrAMINE (BENADRYL) 25 mg capsule Take 1 capsule by mouth Every 6 (Six) Hours As Needed for Itching.    Twyla Guevara MD   fexofenadine (ALLEGRA) 180 MG tablet Take 1 tablet by mouth Daily.    Twyla Guevara MD   fluticasone (FLONASE) 50 MCG/ACT nasal spray 2 sprays into the nostril(s) as directed by provider Daily As Needed for Rhinitis.    Twyla Guevara MD   furosemide (Lasix) 20 MG tablet Take 1 tablet by mouth 2 (Two) Times a Day. 2/23/23   Nathan Zimmer MD   hydrALAZINE (APRESOLINE) 25 MG tablet Take 1 tablet by mouth 3 (Three) Times a Day.  Patient taking differently: Take 4 tablets by mouth 3 (Three) Times a Day. 100mg daily 4/18/22   Trisha Soni   levothyroxine (Synthroid) 75 MCG tablet Take 1 tablet by mouth Daily. 1/5/23   Nathan Zimmer MD   magnesium chloride  ER 64 MG DR tablet Take 143 mg by mouth Daily.    Twyla Guevara MD   Melatonin 10 MG capsule Take 2 capsules by mouth Every Night.    Twyla Guevara MD   mesalamine (APRISO) 0.375 g 24 hr capsule Take 4 capsules by mouth Daily. 9/13/22   Adrien Brewster MD   montelukast (SINGULAIR) 10 MG tablet TAKE 1 TABLET BY MOUTH EVERY NIGHT 11/28/22   Maria L Zambrano APRN   Multiple Vitamins-Minerals (PRESERVISION/LUTEIN) capsule Take 1 capsule by mouth 2 (two) times a day.    Twyla Guevara MD   omeprazole (priLOSEC) 20 MG capsule TAKE 1 CAPSULE DAILY 2/8/22   Tahira Mendez APRN   Oxymetazoline HCl (Nasal Spray) 0.05 % solution 2 squirts to each nostril twice a day x10 days 8/11/22   Twyla Guevara MD   potassium chloride 10 MEQ CR tablet Take 1 tablet by mouth 2 (Two) Times a Day. 2/17/23   Ruthie Parra APRN   sodium bicarbonate 650 MG tablet Take 0.5 tablets by mouth 2 (Two) Times a Day.  Patient taking differently: Take 325 mg by mouth 3 (Three) Times a Day. 11/3/21   Dc Issa DO   valsartan (DIOVAN) 160 MG tablet Take 1 tablet by mouth Daily. 11/29/22   Maria L Zambrano APRN   vitamin D (ERGOCALCIFEROL) 1.25 MG (82633 UT) capsule capsule Take 1 capsule by mouth 1 (One) Time Per Week. 2/13/23   Ruthie Parra APRN       Objective     Vital Signs: There were no vitals taken for this visit.  Physical Exam  ***    {BMI Follow-up - Do not select before height/weight have been entered for this visit:56025}    Results Reviewed:  ***    Assessment / Plan     Assessment/Plan:  There are no diagnoses linked to this encounter.     ***    No follow-ups on file. unless patient needs to be seen sooner or acute issues arise.    I have discussed the patient results/orders and and plan/recommendation with them at today's visit.      STERLING Snell   03/16/2023

## 2023-03-16 NOTE — PROGRESS NOTES
Subjective     Chief Complaint:  Ear fullness/drainage    HPI:  The patient presents to the office today with complaints of his ears feeling full and sinus pressure.  This has been ongoing for a little over a week.  He states he has seen some brown drainage come from both the ears.  He has had no fever or chills.  He has had both a runny nose and postnasal drip.  He denies sore throat.  He denies shortness of breath or chest pain.  He does take Allegra and Singulair on a daily basis.  He uses Flonase and Astelin only as needed.  He does follow with ENT and is due to see them again in May.  The patient had been seen in the office by STERLING Valenzuela on 2/3/2023 with very similar complaints and he states he feels the same as when he saw her during that visit.  He was diagnosed with otitis media of the left ear and was treated with 7 days of Augmentin.  Patient states this did help however started having some sinus symptoms and complained of this during his Medicare wellness visit with Dr. Zimmer on 2/14/2023.  He was again given a 7-day course of Augmentin and a Solu-Medrol injection in the office.  Patient did report some improvement of his symptoms after completing antibiotic therapy.    Patient's PMR from outside medical facility reviewed and noted.    Past Medical History:   Past Medical History:   Diagnosis Date   • 3-vessel CAD 8/11/2020   • Allergic rhinitis    • Anxiety disorder 4/27/2020   • Arthritis    • Asymmetrical sensorineural hearing loss 6/28/2017   • Atherosclerosis of native artery of both lower extremities with intermittent claudication ([facility]) 7/18/2019   • Avascular necrosis of femoral head, left ([facility]) 07/11/2020    right hip after surgery   • Carotid stenosis    • Chronic mucoid otitis media    • Chronic rhinitis    • Coronary artery disease     HEART BYPASS 2004   • Crohn's disease of large intestine with other complication ([facility]) 7/30/2020    Chronic diarrhea Colonoscopy July 2020  revealed mild patchy scattered hemosiderin staining with inflammation more so in rectosigmoid area.  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   • Iron deficiency anemia 7/14/2020   • Mixed hearing loss of left ear    • PAD (peripheral artery disease) ([facility]) 8/11/2020   • Perianal abscess    • Pernicious anemia 08/17/2020    took shots but never diagnosed with b12 deficiency   • Personal history of alcoholism ([facility]) 08/11/2020    quit drinking in 2013   • Prostatic hypertrophy 8/11/2020   • Sensorineural hearing loss    • Sepsis with acute renal failure ([facility]) 9/15/2020   • Shortness of breath 5/27/2021   • Tinnitus    • Ventricular tachycardia, nonsustained 7/14/2020   • Weight loss 7/11/2020     Past Surgical History:  Past Surgical History:   Procedure Laterality Date   • ARTERY SURGERY  2021    right side on neck   • CAROTID ENDARTERECTOMY Right 5/10/2021    Procedure: RIGHT CAROTID ENDARTERECTOMY WITH EEG;  Surgeon: Gil Pineda DO;  Location: [facility] OR ;  Service: Vascular;  Laterality: Right;   • COLONOSCOPY N/A 7/2/2020    Procedure: COLONOSCOPY WITH ANESTHESIA;  Surgeon: Adrien Brewster MD;  Location: [facility] ENDOSCOPY;  Service: Gastroenterology;  Laterality: N/A;  pre op: diarrhea  post op: polyps  PCP: Joe Velasco MD   • COLONOSCOPY N/A 10/13/2020    Procedure: COLONOSCOPY WITH ANESTHESIA;  Surgeon: Adrien Brewster MD;  Location: [facility] ENDOSCOPY;  Service: Gastroenterology;  Laterality: N/A;  Pre: Chronic Diarrhea, Crohn's  Post: AVM  Dr. Neftali Velasco  CO2 Inflation Used   • CORONARY ARTERY BYPASS GRAFT  2003    x3   • ENDOSCOPY N/A  11/2/2021    Procedure: ESOPHAGOGASTRODUODENOSCOPY WITH ANESTHESIA;  Surgeon: Bridger Bell MD;  Location: [facility] ENDOSCOPY;  Service: Gastroenterology;  Laterality: N/A;  pre anemia;gi bleed  post  gi bleed;schatski ring  Dr. ERIC Velasco   • EYE SURGERY Bilateral     catorac   • INCISION AND DRAINAGE PERIRECTAL ABSCESS N/A 3/3/2017    Procedure: INCISION AND DRAINAGE OF JEET ANAL ABSCESS;  Surgeon: Lynette Smith MD;  Location: [facility] OR;  Service:    • MYRINGOTOMY W/ TUBES Left 04/17/2017    06/10/2016   • TONSILLECTOMY     • TOTAL HIP ARTHROPLASTY Right 2006     Social History:  reports that he quit smoking about 9 years ago. His smoking use included cigarettes. He started smoking about 35 years ago. He has a 12.50 pack-year smoking history. He has never used smokeless tobacco. He reports that he does not currently use alcohol. He reports that he does not use drugs.    Family History: family history includes No Known Problems in his daughter, father, and mother.      Allergies:  Allergies   Allergen Reactions   • Ondansetron Anaphylaxis and GI Intolerance   • Zofran [Ondansetron Hcl] Anaphylaxis   • Lortab [Hydrocodone-Acetaminophen] Other (See Comments) and Hallucinations     CLOSTROPHOBIC   • Allopurinol Other (See Comments)     Pain on right side     Medications:  Prior to Admission medications    Medication Sig Start Date End Date Taking? Authorizing Provider   albuterol sulfate  (90 Base) MCG/ACT inhaler USE 2 INHALATIONS FOUR TIMES A DAY AS NEEDED 7/12/21   Tahira Mendez APRN   ALPRAZolam (XANAX) 0.25 MG tablet TAKE 1 TABLET BY MOUTH AT NIGHT AS NEEDED FOR ANXIETY 1/25/23   Nathan Zimmer MD   amLODIPine (NORVASC) 10 MG tablet Take 1 tablet by mouth Daily. 7/25/22   Trisha Soni   aspirin (aspirin) 81 MG EC tablet Take  by mouth Daily.    Provider, MD Twyla   atorvastatin (LIPITOR) 10 MG tablet Take 1 tablet by mouth Daily. 9/23/22   Eleuterio Farley MD    azelastine (ASTELIN) 0.1 % nasal spray USE 1 SPRAY IN EACH NOSTRIL TWICE A DAY AS NEEDED 2/8/22   Tahira Mendez APRN   carvedilol (COREG) 25 MG tablet Take 1 tablet by mouth 2 (Two) Times a Day. 2/28/23   Eleuterio Farley MD   cefdinir (OMNICEF) 300 MG capsule Take 1 capsule by mouth Daily. 3/16/23   Maria L Zambrano APRN   cholestyramine (QUESTRAN) 4 g packet Take 1 packet by mouth Daily. 9/13/22   Adrien Brewster MD   cloNIDine (CATAPRES) 0.2 MG tablet Take 3 tablets by mouth Daily. 12/30/22   Nathan Zimmer MD   clopidogrel (PLAVIX) 75 MG tablet TAKE 1 TABLET DAILY 7/12/22   Elena Jon APRN   Copper Gluconate (Copper Caps) 2 MG capsule Take  by mouth.    ProviderTwyla MD   diphenhydrAMINE (BENADRYL) 25 mg capsule Take 1 capsule by mouth Every 6 (Six) Hours As Needed for Itching.    Twyla Guevara MD   fexofenadine (ALLEGRA) 180 MG tablet Take 1 tablet by mouth Daily.    Twyla Guevara MD   fluticasone (FLONASE) 50 MCG/ACT nasal spray 2 sprays into the nostril(s) as directed by provider Daily As Needed for Rhinitis.    Twyla Guevara MD   furosemide (Lasix) 20 MG tablet Take 1 tablet by mouth 2 (Two) Times a Day. 2/23/23   Nathan Zimmer MD   hydrALAZINE (APRESOLINE) 25 MG tablet Take 1 tablet by mouth 3 (Three) Times a Day.  Patient taking differently: Take 4 tablets by mouth 3 (Three) Times a Day. 100mg daily 4/18/22   Trisha Soni   levothyroxine (Synthroid) 75 MCG tablet Take 1 tablet by mouth Daily. 1/5/23   Nathan Zimmer MD   magnesium chloride ER 64 MG DR tablet Take 143 mg by mouth Daily.    Twyla Guevara MD   Melatonin 10 MG capsule Take 2 capsules by mouth Every Night.    Twyla Guevara MD   mesalamine (APRISO) 0.375 g 24 hr capsule Take 4 capsules by mouth Daily. 9/13/22   Adrien Brewster MD   montelukast (SINGULAIR) 10 MG tablet TAKE 1 TABLET BY MOUTH EVERY NIGHT 11/28/22   Maria L Zambrano,  "STERLING   Multiple Vitamins-Minerals (PRESERVISION/LUTEIN) capsule Take 1 capsule by mouth 2 (two) times a day.    ProviderTwyla MD   omeprazole (priLOSEC) 20 MG capsule TAKE 1 CAPSULE DAILY 2/8/22   Tahira Mendez APRN   Oxymetazoline HCl (Nasal Spray) 0.05 % solution 2 squirts to each nostril twice a day x10 days 8/11/22   ProviderTwyla MD   potassium chloride 10 MEQ CR tablet Take 1 tablet by mouth 2 (Two) Times a Day. 2/17/23   Ruthie Parra APRN   sodium bicarbonate 650 MG tablet Take 0.5 tablets by mouth 2 (Two) Times a Day.  Patient taking differently: Take 325 mg by mouth 3 (Three) Times a Day. 11/3/21   Dc Issa,    valsartan (DIOVAN) 160 MG tablet Take 1 tablet by mouth Daily. 11/29/22   Maria L Zambrano APRN   vitamin D (ERGOCALCIFEROL) 1.25 MG (51091 UT) capsule capsule Take 1 capsule by mouth 1 (One) Time Per Week. 2/13/23   Ruthie Parra APRN       Objective     Vital Signs: /68 (BP Location: Left arm, Patient Position: Sitting, Cuff Size: Adult)   Pulse 58   Temp 96.9 °F (36.1 °C) (Temporal)   Ht 182.9 cm (72\")   Wt 76.2 kg (168 lb)   SpO2 97%   BMI 22.78 kg/m²   Physical Exam  Vitals and nursing note reviewed.   Constitutional:       General: He is not in acute distress.     Appearance: He is not ill-appearing or toxic-appearing.   HENT:      Head: Normocephalic and atraumatic.      Right Ear: External ear normal. No middle ear effusion. A PE tube is present. Tympanic membrane is not perforated or erythematous.      Left Ear: External ear normal.  No middle ear effusion. Tympanic membrane is perforated (known). Tympanic membrane is not erythematous.      Mouth/Throat:      Mouth: Mucous membranes are moist.      Pharynx: Oropharynx is clear.   Cardiovascular:      Rate and Rhythm: Normal rate and regular rhythm.      Pulses: Normal pulses.      Heart sounds: Normal heart sounds.   Pulmonary:      Effort: Pulmonary effort is normal.     "  Breath sounds: No wheezing, rhonchi or rales.   Abdominal:      General: Bowel sounds are normal. There is no distension.      Palpations: Abdomen is soft.      Tenderness: There is no abdominal tenderness.   Musculoskeletal:         General: No swelling or tenderness. Normal range of motion.      Cervical back: Normal range of motion and neck supple. No tenderness.   Skin:     General: Skin is warm and dry.      Findings: No erythema or rash.   Neurological:      General: No focal deficit present.      Mental Status: He is alert and oriented to person, place, and time.   Psychiatric:         Mood and Affect: Mood normal.         Behavior: Behavior normal.         Thought Content: Thought content normal.         Judgment: Judgment normal.       BMI is within normal parameters. No other follow-up for BMI required.    Results Reviewed:  Reviewed the patient's last 2 office visit notes with primary care from 2/3 and 2/14.  Reviewed patient's last ENT note from 11/8/2022.    Assessment / Plan     Assessment/Plan:  Diagnoses and all orders for this visit:    1. Acute recurrent pansinusitis (Primary)    2. Chronic rhinitis    Other orders  -     cefdinir (OMNICEF) 300 MG capsule; Take 1 capsule by mouth Daily.  Dispense: 10 capsule; Refill: 0       The patient presents to the office today with a greater than 1 week history of complaints of ear fullness and sinus pressure.  He has runny nose and postnasal drip.  He states this is very similar to how he felt when he was treated with a course of Augmentin for left otitis media by STERLING Valenzuela on 2/3.  He does have a known perforated left eardrum and follows with ENT.  PE tube noted on the right.  The patient will be treated for sinusitis with Omnicef, which will be renally dosed given stage IV chronic kidney disease.  We will hold on oral or IM steroid therapy at this time as patient received a steroid injection last month and does have known history of CLL.  The  patient will continue Allegra and Singulair and has been advised to take his Flonase on a daily basis rather than as needed.    Return if symptoms worsen or fail to improve, for Keep May appt. unless patient needs to be seen sooner or acute issues arise.    I have discussed the patient results/orders and and plan/recommendation with them at today's visit.      Maria L Zambrano, APRN   03/16/2023

## 2023-03-20 ENCOUNTER — OFFICE VISIT (OUTPATIENT)
Dept: INTERNAL MEDICINE | Facility: CLINIC | Age: 73
End: 2023-03-20
Payer: MEDICARE

## 2023-03-20 VITALS
BODY MASS INDEX: 22.75 KG/M2 | SYSTOLIC BLOOD PRESSURE: 134 MMHG | OXYGEN SATURATION: 95 % | WEIGHT: 168 LBS | HEIGHT: 72 IN | HEART RATE: 58 BPM | DIASTOLIC BLOOD PRESSURE: 64 MMHG | TEMPERATURE: 97.3 F

## 2023-03-20 DIAGNOSIS — J01.41 ACUTE RECURRENT PANSINUSITIS: ICD-10-CM

## 2023-03-20 DIAGNOSIS — J31.0 CHRONIC RHINITIS: Primary | ICD-10-CM

## 2023-03-20 PROCEDURE — 99213 OFFICE O/P EST LOW 20 MIN: CPT | Performed by: NURSE PRACTITIONER

## 2023-03-20 PROCEDURE — 1159F MED LIST DOCD IN RCRD: CPT | Performed by: NURSE PRACTITIONER

## 2023-03-20 PROCEDURE — 3075F SYST BP GE 130 - 139MM HG: CPT | Performed by: NURSE PRACTITIONER

## 2023-03-20 PROCEDURE — 3078F DIAST BP <80 MM HG: CPT | Performed by: NURSE PRACTITIONER

## 2023-03-20 PROCEDURE — 1160F RVW MEDS BY RX/DR IN RCRD: CPT | Performed by: NURSE PRACTITIONER

## 2023-03-20 RX ORDER — PREDNISONE 20 MG/1
20 TABLET ORAL 2 TIMES DAILY
Qty: 6 TABLET | Refills: 0 | Status: SHIPPED | OUTPATIENT
Start: 2023-03-20 | End: 2023-03-23

## 2023-03-20 NOTE — PROGRESS NOTES
"        Subjective     Chief Complaint:  Sinus/ear pressure    HPI:  Patient presents to the office today with complaints of his ears feeling full and sinus pressure.  He had been seen for this in the office last week on 3/16.  He was prescribed a 10-day course of Omnicef, which he states he has been taking.  He does state that he is feeling better during the day however in the afternoon he starts to \"stop up\" again and starts feeling bad.  He states it has been very hard to breathe through his nose.  He has problems with runny nose and postnasal drip.  He has been taking Flonase on a daily basis now as recommended during his last office visit.  He denies any sore throat, shortness of breath, chest pain.    Patient's PMR from outside medical facility reviewed and noted.    Past Medical History:   Past Medical History:   Diagnosis Date   • 3-vessel CAD 8/11/2020   • Allergic rhinitis    • Anxiety disorder 4/27/2020   • Arthritis    • Asymmetrical sensorineural hearing loss 6/28/2017   • Atherosclerosis of native artery of both lower extremities with intermittent claudication ([facility]) 7/18/2019   • Avascular necrosis of femoral head, left ([facility]) 07/11/2020    right hip after surgery   • Carotid stenosis    • Chronic mucoid otitis media    • Chronic rhinitis    • Coronary artery disease     HEART BYPASS 2004   • Crohn's disease of large intestine with other complication ([facility]) 7/30/2020    Chronic diarrhea Colonoscopy July 2020 revealed mild patchy scattered hemosiderin staining with inflammation more so in rectosigmoid area.  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   • Iron " deficiency anemia 7/14/2020   • Mixed hearing loss of left ear    • PAD (peripheral artery disease) ([facility]) 8/11/2020   • Perianal abscess    • Pernicious anemia 08/17/2020    took shots but never diagnosed with b12 deficiency   • Personal history of alcoholism ([facility]) 08/11/2020    quit drinking in 2013   • Prostatic hypertrophy 8/11/2020   • Sensorineural hearing loss    • Sepsis with acute renal failure ([facility]) 9/15/2020   • Shortness of breath 5/27/2021   • Tinnitus    • Ventricular tachycardia, nonsustained 7/14/2020   • Weight loss 7/11/2020     Past Surgical History:  Past Surgical History:   Procedure Laterality Date   • ARTERY SURGERY  2021    right side on neck   • CAROTID ENDARTERECTOMY Right 5/10/2021    Procedure: RIGHT CAROTID ENDARTERECTOMY WITH EEG;  Surgeon: Gil Pineda DO;  Location: [facility] [place] OR ;  Service: Vascular;  Laterality: Right;   • COLONOSCOPY N/A 7/2/2020    Procedure: COLONOSCOPY WITH ANESTHESIA;  Surgeon: Adrien Brewster MD;  Location: [facility] ENDOSCOPY;  Service: Gastroenterology;  Laterality: N/A;  pre op: diarrhea  post op: polyps  PCP: Joe Velasco MD   • COLONOSCOPY N/A 10/13/2020    Procedure: COLONOSCOPY WITH ANESTHESIA;  Surgeon: Adrien Brewster MD;  Location: [facility] ENDOSCOPY;  Service: Gastroenterology;  Laterality: N/A;  Pre: Chronic Diarrhea, Crohn's  Post: AVM  Dr. Neftali Velasco  CO2 Inflation Used   • CORONARY ARTERY BYPASS GRAFT  2003    x3   • ENDOSCOPY N/A 11/2/2021    Procedure: ESOPHAGOGASTRODUODENOSCOPY WITH ANESTHESIA;  Surgeon: Bridger Bell MD;  Location: [facility] ENDOSCOPY;  Service: Gastroenterology;  Laterality: N/A;  pre anemia;gi bleed  post  gi bleed;schatski ring  Dr. ERIC Velasco   • EYE SURGERY Bilateral     catorac   • INCISION AND DRAINAGE PERIRECTAL ABSCESS N/A 3/3/2017    Procedure: INCISION AND DRAINAGE OF JEET ANAL ABSCESS;  Surgeon: Lynette Smith MD;  Location: [facility] OR;  Service:    • MYRINGOTOMY W/ TUBES Left 04/17/2017     06/10/2016   • TONSILLECTOMY     • TOTAL HIP ARTHROPLASTY Right 2006     Social History:  reports that he quit smoking about 9 years ago. His smoking use included cigarettes. He started smoking about 35 years ago. He has a 12.50 pack-year smoking history. He has never used smokeless tobacco. He reports that he does not currently use alcohol. He reports that he does not use drugs.    Family History: family history includes No Known Problems in his daughter, father, and mother.      Allergies:  Allergies   Allergen Reactions   • Ondansetron Anaphylaxis and GI Intolerance   • Zofran [Ondansetron Hcl] Anaphylaxis   • Lortab [Hydrocodone-Acetaminophen] Other (See Comments) and Hallucinations     CLOSTROPHOBIC   • Allopurinol Other (See Comments)     Pain on right side     Medications:  Prior to Admission medications    Medication Sig Start Date End Date Taking? Authorizing Provider   albuterol sulfate  (90 Base) MCG/ACT inhaler USE 2 INHALATIONS FOUR TIMES A DAY AS NEEDED 7/12/21  Yes Tahira Mendez APRN   ALPRAZolam (XANAX) 0.25 MG tablet TAKE 1 TABLET BY MOUTH AT NIGHT AS NEEDED FOR ANXIETY 1/25/23  Yes Nathan Zimmer MD   amLODIPine (NORVASC) 10 MG tablet Take 1 tablet by mouth Daily. 7/25/22  Yes Trisha Soni   aspirin (aspirin) 81 MG EC tablet Take  by mouth Daily.   Yes ProviderTwyla MD   atorvastatin (LIPITOR) 10 MG tablet Take 1 tablet by mouth Daily. 9/23/22  Yes Eleuterio Farley MD   azelastine (ASTELIN) 0.1 % nasal spray USE 1 SPRAY IN EACH NOSTRIL TWICE A DAY AS NEEDED 2/8/22  Yes Tahira Mendez APRN   carvedilol (COREG) 25 MG tablet Take 1 tablet by mouth 2 (Two) Times a Day. 2/28/23  Yes Eleuterio Farley MD   cefdinir (OMNICEF) 300 MG capsule Take 1 capsule by mouth Daily. 3/16/23  Yes Maria L Zambrano APRN   cholestyramine (QUESTRAN) 4 g packet Take 1 packet by mouth Daily. 9/13/22  Yes Adrien Brewster MD   cloNIDine (CATAPRES)  0.2 MG tablet Take 3 tablets by mouth Daily. 12/30/22  Yes Nathan Zimmer MD   clopidogrel (PLAVIX) 75 MG tablet TAKE 1 TABLET DAILY 7/12/22  Yes Elena Jon APRN   Copper Gluconate (Copper Caps) 2 MG capsule Take  by mouth.   Yes Twyla Guevara MD   diphenhydrAMINE (BENADRYL) 25 mg capsule Take 1 capsule by mouth Every 6 (Six) Hours As Needed for Itching.   Yes Twyla Guevara MD   fexofenadine (ALLEGRA) 180 MG tablet Take 1 tablet by mouth Daily.   Yes Twyla Guevara MD   fluticasone (FLONASE) 50 MCG/ACT nasal spray 2 sprays into the nostril(s) as directed by provider Daily As Needed for Rhinitis.   Yes Twyla Guevara MD   furosemide (Lasix) 20 MG tablet Take 1 tablet by mouth 2 (Two) Times a Day. 2/23/23  Yes Nathan Zimmer MD   hydrALAZINE (APRESOLINE) 25 MG tablet Take 1 tablet by mouth 3 (Three) Times a Day.  Patient taking differently: Take 4 tablets by mouth 3 (Three) Times a Day. 100mg daily 4/18/22  Yes Trisha Soni   levothyroxine (Synthroid) 75 MCG tablet Take 1 tablet by mouth Daily. 1/5/23  Yes Nathan Zimmer MD   magnesium chloride ER 64 MG DR tablet Take 143 mg by mouth Daily.   Yes wTyla Guevara MD   Melatonin 10 MG capsule Take 2 capsules by mouth Every Night.   Yes Twyla Guevara MD   mesalamine (APRISO) 0.375 g 24 hr capsule Take 4 capsules by mouth Daily. 9/13/22  Yes Adrien Brewster MD   montelukast (SINGULAIR) 10 MG tablet TAKE 1 TABLET BY MOUTH EVERY NIGHT 11/28/22  Yes Maria L Zambrano APRN   Multiple Vitamins-Minerals (PRESERVISION/LUTEIN) capsule Take 1 capsule by mouth 2 (two) times a day.   Yes Twyla Guevara MD   omeprazole (priLOSEC) 20 MG capsule TAKE 1 CAPSULE DAILY 2/8/22  Yes Tahira Mendez APRN   Oxymetazoline HCl (Nasal Spray) 0.05 % solution 2 squirts to each nostril twice a day x10 days 8/11/22  Yes Provider, MD Twyla   potassium chloride 10 MEQ CR tablet Take 1 tablet by  "mouth 2 (Two) Times a Day. 2/17/23  Yes Fidel, Harford C, APRN   sodium bicarbonate 650 MG tablet Take 0.5 tablets by mouth 2 (Two) Times a Day.  Patient taking differently: Take 325 mg by mouth 3 (Three) Times a Day. 11/3/21  Yes Dc Issa DO   valsartan (DIOVAN) 160 MG tablet Take 1 tablet by mouth Daily. 11/29/22  Yes Maria L Zambrano APRN   vitamin D (ERGOCALCIFEROL) 1.25 MG (08611 UT) capsule capsule Take 1 capsule by mouth 1 (One) Time Per Week. 2/13/23  Yes Fidel, Harford C, APRN   predniSONE (DELTASONE) 20 MG tablet Take 1 tablet by mouth 2 (Two) Times a Day for 3 days. 3/20/23 3/23/23  Maria L Zambrano APRN       Objective     Vital Signs: /64 (BP Location: Left arm, Patient Position: Sitting, Cuff Size: Adult)   Pulse 58   Temp 97.3 °F (36.3 °C) (Temporal)   Ht 182.9 cm (72\")   Wt 76.2 kg (168 lb)   SpO2 95%   BMI 22.78 kg/m²   Vitals and nursing note reviewed.   Constitutional:       General: He is not in acute distress.     Appearance: He is not ill-appearing or toxic-appearing.   HENT:      Head: Normocephalic and atraumatic.      Right Ear: External ear normal. No middle ear effusion. A PE tube is present. Tympanic membrane is not perforated or erythematous.      Left Ear: External ear normal.  No middle ear effusion. Tympanic membrane is perforated (known). Tympanic membrane is not erythematous.      Mouth/Throat:      Mouth: Mucous membranes are moist.      Pharynx: Oropharynx is clear.      Nose: Maxillary sinus tenderness  Cardiovascular:      Rate and Rhythm: Normal rate and regular rhythm.      Pulses: Normal pulses.      Heart sounds: Normal heart sounds.   Pulmonary:      Effort: Pulmonary effort is normal.      Breath sounds: No wheezing, rhonchi or rales.   Abdominal:      General: Bowel sounds are normal. There is no distension.      Palpations: Abdomen is soft.      Tenderness: There is no abdominal tenderness.   Musculoskeletal:         General: No swelling " or tenderness. Normal range of motion.      Cervical back: Normal range of motion and neck supple. No tenderness.   Skin:     General: Skin is warm and dry.      Findings: No erythema or rash.   Neurological:      General: No focal deficit present.      Mental Status: He is alert and oriented to person, place, and time.   Psychiatric:         Mood and Affect: Mood normal.         Behavior: Behavior normal.         Thought Content: Thought content normal.         Judgment: Judgment normal.     BMI is within normal parameters. No other follow-up for BMI required.      Assessment / Plan     Assessment/Plan:  Diagnoses and all orders for this visit:    1. Chronic rhinitis (Primary)    2. Acute recurrent pansinusitis    Other orders  -     predniSONE (DELTASONE) 20 MG tablet; Take 1 tablet by mouth 2 (Two) Times a Day for 3 days.  Dispense: 6 tablet; Refill: 0       Patient presents to the office today with continued complaints of sinus pressure and ears feeling full.  The patient had been seen in the office by STERLING Valenzuela on 2/3/2023 with very similar complaints. He was diagnosed with otitis media of the left ear and was treated with 7 days of Augmentin.  Patient states this did help however started having some sinus symptoms and complained of this during his Medicare wellness visit with Dr. Zimmer on 2/14/2023.  He was again given a 7-day course of Augmentin and a Solu-Medrol injection in the office.  Patient did report some improvement of his symptoms after completing antibiotic therapy.  During his last office visit with me on 3/16/2023 he was prescribed a 10-day course of Omnicef, which he is still taking.  He was advised to continue Allegra and Singulair and to start taking Flonase on a regular basis rather than as needed.  At this time, we will prescribe oral steroid therapy to see if this will help improve his sinus drainage and feelings of ears being full.  His physical exam does not appear any worse  today.  I have asked him to call the office for any worsening or no improvement of symptoms.    Return if symptoms worsen or fail to improve. unless patient needs to be seen sooner or acute issues arise.      I have discussed the patient results/orders and and plan/recommendation with them at today's visit.      Maria L Zambrano, APRN   03/20/2023

## 2023-03-22 ENCOUNTER — TELEPHONE (OUTPATIENT)
Dept: OTOLARYNGOLOGY | Facility: CLINIC | Age: 73
End: 2023-03-22
Payer: MEDICARE

## 2023-03-22 NOTE — TELEPHONE ENCOUNTER
Caller: CHARLY COY     Relationship: SELF     Best call back number: [phone]      INCOMING CALL FROM PT. PT WANTS TO SET HIS APPT BACK UP WITH DR. JHAVERI THAT WAS CANCELLED BACK IN December. THIS APPT WAS TO DISCUSS SURGERY AND PT WANTS IT BEFORE HIS FOLLOW UP WITH JEFFERY MEI.

## 2023-03-27 ENCOUNTER — OFFICE VISIT (OUTPATIENT)
Dept: INTERNAL MEDICINE | Facility: CLINIC | Age: 73
End: 2023-03-27
Payer: MEDICARE

## 2023-03-27 VITALS
SYSTOLIC BLOOD PRESSURE: 152 MMHG | BODY MASS INDEX: 22.62 KG/M2 | TEMPERATURE: 97.3 F | HEART RATE: 60 BPM | DIASTOLIC BLOOD PRESSURE: 78 MMHG | HEIGHT: 72 IN | OXYGEN SATURATION: 99 % | WEIGHT: 167 LBS

## 2023-03-27 DIAGNOSIS — I10 RESISTANT HYPERTENSION: ICD-10-CM

## 2023-03-27 DIAGNOSIS — I25.9 IHD (ISCHEMIC HEART DISEASE): ICD-10-CM

## 2023-03-27 DIAGNOSIS — N18.4 ANEMIA DUE TO STAGE 4 CHRONIC KIDNEY DISEASE: Primary | ICD-10-CM

## 2023-03-27 DIAGNOSIS — I51.89 DIASTOLIC DYSFUNCTION: ICD-10-CM

## 2023-03-27 DIAGNOSIS — N18.4 CKD (CHRONIC KIDNEY DISEASE) STAGE 4, GFR 15-29 ML/MIN: ICD-10-CM

## 2023-03-27 DIAGNOSIS — R01.1 SYSTOLIC MURMUR: Primary | Chronic | ICD-10-CM

## 2023-03-27 DIAGNOSIS — D63.1 ANEMIA DUE TO STAGE 4 CHRONIC KIDNEY DISEASE: Primary | ICD-10-CM

## 2023-03-27 PROBLEM — F10.21 HISTORY OF ALCOHOLISM: Status: RESOLVED | Noted: 2021-06-25 | Resolved: 2023-03-27

## 2023-03-27 PROBLEM — D69.6 THROMBOCYTOPENIA: Status: RESOLVED | Noted: 2021-06-25 | Resolved: 2023-03-27

## 2023-03-27 PROBLEM — E44.0 MODERATE MALNUTRITION: Status: RESOLVED | Noted: 2020-08-21 | Resolved: 2023-03-27

## 2023-03-27 PROBLEM — R06.02 SHORTNESS OF BREATH: Status: RESOLVED | Noted: 2021-05-27 | Resolved: 2023-03-27

## 2023-03-27 PROBLEM — N17.9 ACUTE RENAL FAILURE SUPERIMPOSED ON STAGE 4 CHRONIC KIDNEY DISEASE: Status: RESOLVED | Noted: 2020-07-11 | Resolved: 2023-03-27

## 2023-03-27 PROBLEM — A41.9 SEPSIS: Status: RESOLVED | Noted: 2020-09-15 | Resolved: 2023-03-27

## 2023-03-27 PROBLEM — M87.059 AVASCULAR NECROSIS OF BONE OF HIP: Status: RESOLVED | Noted: 2021-05-18 | Resolved: 2023-03-27

## 2023-03-27 RX ORDER — CARVEDILOL 6.25 MG/1
6.25 TABLET ORAL 2 TIMES DAILY WITH MEALS
Qty: 60 TABLET | Refills: 2 | Status: SHIPPED | OUTPATIENT
Start: 2023-03-27

## 2023-03-27 RX ORDER — SODIUM BICARBONATE 650 MG/1
650 TABLET ORAL 3 TIMES DAILY
Qty: 270 TABLET | Refills: 3
Start: 2023-03-27

## 2023-03-27 RX ORDER — HYDRALAZINE HYDROCHLORIDE 25 MG/1
100 TABLET, FILM COATED ORAL 3 TIMES DAILY
Start: 2023-03-27

## 2023-03-27 RX ORDER — CLONIDINE HYDROCHLORIDE 0.3 MG/1
0.3 TABLET ORAL 3 TIMES DAILY
Qty: 90 TABLET | Refills: 2 | Status: SHIPPED | OUTPATIENT
Start: 2023-03-27 | End: 2023-06-25

## 2023-03-28 NOTE — PROGRESS NOTES
"      Chief Complaint  discuss medication (Wants to discuss changing carvedilol that the cardiologist put him on /Causes fluid retention, already stopped on 3/21/23, pt was on medication about a month /) and med fill (Wants you to start filling his sodium bicarbonate 650)    Subjective        Ricardo Hugo presents to [facility]    HPI    Patient BP elevated.  Patient was recently started on carvedilol 25 mg BID by Dr. Farley.  Patient noted to have weight gain and fluid retention after starting which is listed as an adverse effects on up to date.  Patient stopped medication and had improvement of symptoms.  The patients BP however does remain elevated.  Patient willing to go back on medication at a lower dose.  Given his baseline HR is 50s, I think 6.25 mg BID is about as high as I would want to go at present.  Will also increase clonidine to 0.3 mg.  Patient was reportedly prescribed farxiga by cardiology, however, both nephrology and I have discussed this at length and patient is a risky candidate for trial of this medication, but we were going to attempt.  Unfortunately insurance decline this medication and instead recommended off-label usage of jardiance for CKD, which seems inappropriate for insurance to recommend off label use of a medication.      Patients eats have been stable.  Patient to see ENT soon for possible sinus surgery.    Review of Systems    Objective   Vital Signs:  /78   Pulse 60   Temp 97.3 °F (36.3 °C) (Temporal)   Ht 182.9 cm (72\")   Wt 75.8 kg (167 lb)   SpO2 99%   BMI 22.65 kg/m²   Estimated body mass index is 22.65 kg/m² as calculated from the following:    Height as of this encounter: 182.9 cm (72\").    Weight as of this encounter: 75.8 kg (167 lb).      Physical Exam  Vitals reviewed.   Constitutional:       Appearance: He is not ill-appearing.   Eyes:      General: No scleral icterus.     Conjunctiva/sclera: Conjunctivae normal. "   Cardiovascular:      Rate and Rhythm: Normal rate and regular rhythm.      Heart sounds: Murmur heard.   Pulmonary:      Effort: Pulmonary effort is normal. No respiratory distress.   Skin:     General: Skin is warm and dry.      Coloration: Skin is not jaundiced or pale.   Neurological:      General: No focal deficit present.      Mental Status: He is alert and oriented to person, place, and time.   Psychiatric:         Mood and Affect: Mood normal.         Behavior: Behavior normal.                     Assessment and Plan   Diagnoses and all orders for this visit:    1. Systolic murmur (Primary)    2. Resistant hypertension  -     hydrALAZINE (APRESOLINE) 25 MG tablet; Take 4 tablets by mouth 3 (Three) Times a Day. 100mg daily  -     cloNIDine (Catapres) 0.3 MG tablet; Take 1 tablet by mouth 3 (Three) Times a Day for 90 days.  Dispense: 90 tablet; Refill: 2  -     carvedilol (Coreg) 6.25 MG tablet; Take 1 tablet by mouth 2 (Two) Times a Day With Meals.  Dispense: 60 tablet; Refill: 2    3. Diastolic dysfunction  -     carvedilol (Coreg) 6.25 MG tablet; Take 1 tablet by mouth 2 (Two) Times a Day With Meals.  Dispense: 60 tablet; Refill: 2    4. IHD (ischemic heart disease)  -     carvedilol (Coreg) 6.25 MG tablet; Take 1 tablet by mouth 2 (Two) Times a Day With Meals.  Dispense: 60 tablet; Refill: 2    5. CKD (chronic kidney disease) stage 4, GFR 15-29 ml/min ([facility])  -     sodium bicarbonate 650 MG tablet; Take 1 tablet by mouth 3 (Three) Times a Day.  Dispense: 270 tablet; Refill: 3      Given his eGFR is close to 25, will hold on attempting farxiga again at present time.      If patients BP not well controlled, will have to consider spironolactone, although his GFR may not allow and he does have a history of hyperkalemia, but it has been a while since he has had an elevated hyperkalemia.  Will likely touch base with Slava KATZ with nephrology if think aldactone will be necessary.    We will trial the carvedilol  6.25 mg BID, will also increase clonidine.    Refill the patient's bicarbonate.      Consider checking renal function soon.  Working really hard to try to prevent patient on proceeding towards dialysis.      Result Review :  The following data was reviewed by: Nathan Zimmer MD on 03/27/2023:  CMP    CMP 11/18/22 1/13/23 1/27/23   Glucose 116 (A) 134 (A) 92   BUN 57 (A) 39 (A) 42 (A)   Creatinine 2.44 (A) 2.67 (A) 2.54 (A)   eGFR 27.1 (A) 24.3 (A)    Sodium 141 138 143   Potassium 4.5 4.1 4.4   Chloride 109 (A) 103 108 (A)   Calcium 9.1 8.6 8.8   Total Protein   6.5   Total Protein 7.0 6.8    Albumin 4.30 4.3 4.3   Globulin   2.2   Globulin 2.7 2.5    Total Bilirubin 0.3 0.4 0.4   Alkaline Phosphatase 172 (A) 160 (A) 156 (A)   AST (SGOT) 19 20 20   ALT (SGPT) 20 21 19   Albumin/Globulin Ratio 1.6 1.7    BUN/Creatinine Ratio 23.4 14.6 16.5   Anion Gap 13.0 15.0    (A) Abnormal value       Comments are available for some flowsheets but are not being displayed.           BMP    BMP 11/18/22 1/13/23 1/27/23   BUN 57 (A) 39 (A) 42 (A)   Creatinine 2.44 (A) 2.67 (A) 2.54 (A)   Sodium 141 138 143   Potassium 4.5 4.1 4.4   Chloride 109 (A) 103 108 (A)   CO2 19.0 (A) 20.0 (A) 21.9 (A)   Calcium 9.1 8.6 8.8   (A) Abnormal value             Latest Reference Range & Units 02/14/23 07:57   Creatinine, Urine mg/dL 58.3   Total Protein, Urine mg/dL 194.9   Protein/Creatinine Ratio 0.0 - 200.0 mg/G Crea 3,343.1 (H)   (H): Data is abnormally high         BMI is within normal parameters. No other follow-up for BMI required.           Follow Up   Return if symptoms worsen or fail to improve, for Next scheduled follow up.  Patient was given instructions and counseling regarding his condition or for health maintenance advice. Please see specific information pulled into the AVS if appropriate.       MAHENDRA Zimmer MD, FACP, FHM      Electronically signed by Nathan Zimmer MD, 03/27/23, 7:34 PM CDT.

## 2023-04-07 ENCOUNTER — LAB (OUTPATIENT)
Dept: LAB | Facility: HOSPITAL | Age: 73
End: 2023-04-07
Payer: MEDICARE

## 2023-04-07 ENCOUNTER — INFUSION (OUTPATIENT)
Dept: ONCOLOGY | Facility: HOSPITAL | Age: 73
End: 2023-04-07
Payer: MEDICARE

## 2023-04-07 VITALS
TEMPERATURE: 97.5 F | DIASTOLIC BLOOD PRESSURE: 46 MMHG | HEART RATE: 51 BPM | WEIGHT: 165.6 LBS | SYSTOLIC BLOOD PRESSURE: 133 MMHG | BODY MASS INDEX: 22.43 KG/M2 | RESPIRATION RATE: 18 BRPM | OXYGEN SATURATION: 97 % | HEIGHT: 72 IN

## 2023-04-07 DIAGNOSIS — N18.4 ANEMIA DUE TO STAGE 4 CHRONIC KIDNEY DISEASE: ICD-10-CM

## 2023-04-07 DIAGNOSIS — D63.1 ANEMIA DUE TO STAGE 3 CHRONIC KIDNEY DISEASE, UNSPECIFIED WHETHER STAGE 3A OR 3B CKD: Primary | ICD-10-CM

## 2023-04-07 DIAGNOSIS — N18.30 ANEMIA DUE TO STAGE 3 CHRONIC KIDNEY DISEASE, UNSPECIFIED WHETHER STAGE 3A OR 3B CKD: Primary | ICD-10-CM

## 2023-04-07 DIAGNOSIS — D63.1 ANEMIA DUE TO STAGE 4 CHRONIC KIDNEY DISEASE: Primary | ICD-10-CM

## 2023-04-07 DIAGNOSIS — N18.4 ANEMIA DUE TO STAGE 4 CHRONIC KIDNEY DISEASE: Primary | ICD-10-CM

## 2023-04-07 DIAGNOSIS — D63.1 ANEMIA DUE TO STAGE 4 CHRONIC KIDNEY DISEASE: ICD-10-CM

## 2023-04-07 LAB
DEPRECATED RDW RBC AUTO: 52.6 FL (ref 37–54)
ERYTHROCYTE [DISTWIDTH] IN BLOOD BY AUTOMATED COUNT: 14.6 % (ref 12.3–15.4)
HCT VFR BLD AUTO: 31.4 % (ref 37.5–51)
HGB BLD-MCNC: 9.6 G/DL (ref 13–17.7)
HOLD SPECIMEN: NORMAL
MCH RBC QN AUTO: 30.8 PG (ref 26.6–33)
MCHC RBC AUTO-ENTMCNC: 30.6 G/DL (ref 31.5–35.7)
MCV RBC AUTO: 100.6 FL (ref 79–97)
PLATELET # BLD AUTO: 92 10*3/MM3 (ref 140–450)
PMV BLD AUTO: 10.9 FL (ref 6–12)
RBC # BLD AUTO: 3.12 10*6/MM3 (ref 4.14–5.8)
WBC NRBC COR # BLD: 4.82 10*3/MM3 (ref 3.4–10.8)

## 2023-04-07 PROCEDURE — 25010000002 EPOETIN ALFA-EPBX 40000 UNIT/ML SOLUTION: Performed by: NURSE PRACTITIONER

## 2023-04-07 PROCEDURE — 85027 COMPLETE CBC AUTOMATED: CPT

## 2023-04-07 PROCEDURE — 96372 THER/PROPH/DIAG INJ SC/IM: CPT

## 2023-04-07 PROCEDURE — 36415 COLL VENOUS BLD VENIPUNCTURE: CPT

## 2023-04-07 RX ADMIN — EPOETIN ALFA-EPBX 40000 UNITS: 40000 INJECTION, SOLUTION INTRAVENOUS; SUBCUTANEOUS at 09:17

## 2023-04-12 ENCOUNTER — OFFICE VISIT (OUTPATIENT)
Dept: OTOLARYNGOLOGY | Facility: CLINIC | Age: 73
End: 2023-04-12
Payer: MEDICARE

## 2023-04-12 ENCOUNTER — PROCEDURE VISIT (OUTPATIENT)
Dept: OTOLARYNGOLOGY | Facility: CLINIC | Age: 73
End: 2023-04-12
Payer: MEDICARE

## 2023-04-12 VITALS
HEIGHT: 72 IN | HEART RATE: 49 BPM | BODY MASS INDEX: 21.89 KG/M2 | SYSTOLIC BLOOD PRESSURE: 140 MMHG | TEMPERATURE: 97.3 F | DIASTOLIC BLOOD PRESSURE: 52 MMHG | WEIGHT: 161.6 LBS

## 2023-04-12 DIAGNOSIS — H90.6 MIXED CONDUCTIVE AND SENSORINEURAL HEARING LOSS OF BOTH EARS: Primary | ICD-10-CM

## 2023-04-12 DIAGNOSIS — Z96.22 S/P MYRINGOTOMY WITH INSERTION OF TUBE: ICD-10-CM

## 2023-04-12 DIAGNOSIS — H69.83 DYSFUNCTION OF BOTH EUSTACHIAN TUBES: ICD-10-CM

## 2023-04-12 DIAGNOSIS — H90.3 SENSORINEURAL HEARING LOSS (SNHL), BILATERAL: ICD-10-CM

## 2023-04-12 DIAGNOSIS — Z79.01 ANTICOAGULATED: ICD-10-CM

## 2023-04-12 DIAGNOSIS — J34.2 NASAL SEPTAL DEVIATION: ICD-10-CM

## 2023-04-12 DIAGNOSIS — H90.A32 MIXED CONDUCTIVE AND SENSORINEURAL HEARING LOSS OF LEFT EAR WITH RESTRICTED HEARING OF RIGHT EAR: ICD-10-CM

## 2023-04-12 DIAGNOSIS — H69.83 EUSTACHIAN TUBE DYSFUNCTION, BILATERAL: ICD-10-CM

## 2023-04-12 DIAGNOSIS — H72.92 PERFORATION OF LEFT TYMPANIC MEMBRANE: Primary | ICD-10-CM

## 2023-04-12 DIAGNOSIS — H72.92 PERFORATION OF LEFT TYMPANIC MEMBRANE: ICD-10-CM

## 2023-04-12 DIAGNOSIS — J34.3 HYPERTROPHY OF INFERIOR NASAL TURBINATE: ICD-10-CM

## 2023-04-12 DIAGNOSIS — H93.12 TINNITUS OF LEFT EAR: ICD-10-CM

## 2023-04-12 DIAGNOSIS — J31.0 CHRONIC RHINITIS: ICD-10-CM

## 2023-04-12 DIAGNOSIS — J32.9 CHRONIC SINUSITIS, UNSPECIFIED LOCATION: ICD-10-CM

## 2023-04-12 PROBLEM — H69.93 EUSTACHIAN TUBE DYSFUNCTION, BILATERAL: Status: ACTIVE | Noted: 2023-04-12

## 2023-04-12 PROCEDURE — 1159F MED LIST DOCD IN RCRD: CPT | Performed by: OTOLARYNGOLOGY

## 2023-04-12 PROCEDURE — 92557 COMPREHENSIVE HEARING TEST: CPT

## 2023-04-12 PROCEDURE — 1160F RVW MEDS BY RX/DR IN RCRD: CPT | Performed by: OTOLARYNGOLOGY

## 2023-04-12 PROCEDURE — 92567 TYMPANOMETRY: CPT

## 2023-04-12 PROCEDURE — 3077F SYST BP >= 140 MM HG: CPT | Performed by: OTOLARYNGOLOGY

## 2023-04-12 PROCEDURE — 99213 OFFICE O/P EST LOW 20 MIN: CPT | Performed by: OTOLARYNGOLOGY

## 2023-04-12 PROCEDURE — 3078F DIAST BP <80 MM HG: CPT | Performed by: OTOLARYNGOLOGY

## 2023-04-12 NOTE — PATIENT INSTRUCTIONS
Place Vasoline in nose in the morning and at night on the center part of the nose (septum) at least 15 minutes prior to the nasal spray. When using the nasal spray, aim towards the outer cornier of the eye.       CONTACT INFORMATION:  The main office phone number is [phone]. For emergencies after hours and on weekends, this number will convert over to our answering service and the on call provider will answer. Please try to keep non emergent phone calls/ questions to office hours 9am-5pm Monday through Friday.     [employer]  As an alternative, you can sign up and use the Epic MyChart system for more direct and quicker access for non emergent questions/ problems.  [employer] allows you to send messages to your doctor, view your test results, renew your prescriptions, schedule appointments, and more. To sign up, go to [employer].    If you have questions, you can email [email or web address] or call [phone] to talk to our [employer] staff. Remember, [employer] is NOT to be used for urgent needs. For medical emergencies, dial 911.

## 2023-04-12 NOTE — PROGRESS NOTES
"    MAHENDRA Chen MD  [facility]  [address], [address]  [facility] [address]  Dept: [phone]  Dept Fax: [phone]  Loc: [phone]       Visit Type: FOLLOW UP   Chief Complaint   Patient presents with   • Ear Problem   • eustachian tube dysfunction     Back from Audio        HPI  Ricardo Hugo is a 73 y.o. male.    Mr. Hugo is a 73-year-old male who presents for a follow-up.    The patient has some tubes and has a hole in his eardrum in his left ear. He denies any trouble with his right ear. He is hearing a constant ringing noise in his left ear. He does occasionally experiences drainage. He denies having pressure. The patient complains of always having problems with his eustachian tube. He notes he is breathing well today, but 3 weeks ago he was not. His nostrils were completely closed up. When he blew his nose, he would get mucus, but he would burst blood vessels and get blood clots. He can tolerate the ear trouble, but not the breathing trouble. He has taken Allegra and Singulair. Sudafed was \"wonderful\" when he could take it. He is taking Plavix. He has poor leg circulation.    Past Medical History:   Diagnosis Date   • 3-vessel CAD 8/11/2020   • Allergic rhinitis    • Anxiety disorder 4/27/2020   • Arthritis    • Asymmetrical sensorineural hearing loss 6/28/2017   • Atherosclerosis of native artery of both lower extremities with intermittent claudication 7/18/2019   • Avascular necrosis of femoral head, left 07/11/2020    right hip after surgery   • Carotid stenosis    • Chronic mucoid otitis media    • Chronic rhinitis    • Coronary artery disease     HEART BYPASS 2004   • Crohn's disease of large intestine with other complication 7/30/2020    Chronic diarrhea Colonoscopy July 2020 revealed mild patchy scattered hemosiderin staining with inflammation more so in rectosigmoid area.  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   • Iron deficiency anemia 7/14/2020   • Mixed hearing loss of left ear    • PAD (peripheral artery disease) 8/11/2020   • Perianal abscess    • Pernicious anemia 08/17/2020    took shots but never diagnosed with b12 deficiency   • Personal history of alcoholism 08/11/2020    quit drinking in 2013   • Prostatic hypertrophy 8/11/2020   • Sensorineural hearing loss    • Sepsis with acute renal failure 9/15/2020   • Shortness of breath 5/27/2021   • Tinnitus    • Ventricular tachycardia, nonsustained 7/14/2020   • Weight loss 7/11/2020       Past Surgical History:   Procedure Laterality Date   • ARTERY SURGERY  2021    right side on neck   • CAROTID ENDARTERECTOMY Right 5/10/2021    Procedure: RIGHT CAROTID ENDARTERECTOMY WITH EEG;  Surgeon: Gil Pineda DO;  Location: [facility] OR ;  Service: Vascular;  Laterality: Right;   • COLONOSCOPY N/A 7/2/2020    Procedure: COLONOSCOPY WITH ANESTHESIA;  Surgeon: Adrien Brewster MD;  Location: [facility] ENDOSCOPY;  Service: Gastroenterology;  Laterality: N/A;  pre op: diarrhea  post op: polyps  PCP: Joe Velasco MD   • COLONOSCOPY N/A 10/13/2020    Procedure: COLONOSCOPY WITH ANESTHESIA;  Surgeon: Adrien Brewster MD;  Location: [facility] ENDOSCOPY;  Service: Gastroenterology;  Laterality: N/A;  Pre: Chronic Diarrhea, Crohn's  Post: AVM  Dr. Neftali Velasco  CO2 Inflation Used   • CORONARY ARTERY BYPASS GRAFT  2003    x3   • ENDOSCOPY N/A 11/2/2021    Procedure: ESOPHAGOGASTRODUODENOSCOPY WITH ANESTHESIA;  Surgeon: Bridger Bell MD;  Location: [facility] ENDOSCOPY;  Service: Gastroenterology;  Laterality: N/A;   pre anemia;gi bleed  post  gi bleed;schatski ring  Dr. ERIC Velasco   • EYE SURGERY Bilateral     catorac   • INCISION AND DRAINAGE PERIRECTAL ABSCESS N/A 3/3/2017    Procedure: INCISION AND DRAINAGE OF JEET ANAL ABSCESS;  Surgeon: Lynette Smith MD;  [facility]: [facility];  Service:    • MYRINGOTOMY W/ TUBES Left 04/17/2017    06/10/2016   • TONSILLECTOMY     • TOTAL HIP ARTHROPLASTY Right 2006       Family History: His family history includes No Known Problems in his daughter, father, and mother.     Social History: He  reports that he quit smoking about 9 years ago. His smoking use included cigarettes. He started smoking about 35 years ago. He has a 12.50 pack-year smoking history. He has never used smokeless tobacco. He reports that he does not currently use alcohol. He reports that he does not use drugs.    Home Medications:  ALPRAZolam, Copper Gluconate, Melatonin, Nasal Spray, PreserVision/Lutein, albuterol sulfate HFA, amLODIPine, aspirin, atorvastatin, azelastine, carvedilol, cefdinir, cholestyramine, cloNIDine, clopidogrel, diphenhydrAMINE, fexofenadine, fluticasone, furosemide, hydrALAZINE, levothyroxine, magnesium chloride ER, mesalamine, montelukast, omeprazole, potassium chloride, sodium bicarbonate, valsartan, and vitamin D    Allergies:  He is allergic to ondansetron, zofran [ondansetron hcl], lortab [hydrocodone-acetaminophen], and allopurinol.       Vital Signs:   Temp:  [97.3 °F (36.3 °C)] 97.3 °F (36.3 °C)  Heart Rate:  [49] 49  BP: (140)/(52) 140/52  ENT Physical Exam  Constitutional  Appearance: patient appears well-developed and well-nourished,  Communication/Voice: communication appropriate for developmental age; vocal quality normal;  Head and Face  Appearance: head appears normal, face appears normal and face appears atraumatic;  Palpation: facial palpation normal;  Salivary: glands normal;  Ear  Hearing: Rinne BC > AC to the left; lateralizes left;  Auricles: right auricle normal; left  auricle normal;  External Mastoids: right external mastoid normal; left external mastoid normal;  Ear Canals: bilateral ear canals normal;  Tympanic Membranes: bilateral tympanic membranes normal;  Ear comments: Both ear canals are open. His right eardrum has a tube in acceptable position, open, looks as though it is ventilating. The left ear does not have a tube, and he has a bigger hole than he needs it is maybe 30 to 40 percent perforation, central. No drainage is present. No collection of cholesteatoma.   Nose  External Nose: nares patent bilaterally; external nose normal;  Nose comments: Mucus present. He has a deviated septum.   Oral Cavity/Oropharynx  Lips: normal;  Neck  Neck: neck normal;  Respiratory  Inspection: breathing unlabored; normal breathing rate;  Cardiovascular  Inspection: extremities are warm and well perfused; no peripheral edema present;  Neurovestibular  Mental Status: alert and oriented;  Psychiatric: mood normal; affect is appropriate;         Result Review    RESULTS REVIEW    Today's audiogram shows a mixed hearing loss on the left-hand side with sensorineural loss with a minimal conductive component on the right-hand side.    Assessment & Plan    Diagnoses and all orders for this visit:    1. Perforation of left tympanic membrane (Primary)  -     Comprehensive Hearing Test; Future    2. Eustachian tube dysfunction, bilateral  -     Comprehensive Hearing Test; Future    3. Sensorineural hearing loss (SNHL), bilateral  -     Comprehensive Hearing Test; Future    4. Chronic rhinitis  -     CT Sinus Without Contrast; Future    5. Nasal septal deviation  -     CT Sinus Without Contrast; Future    6. Hypertrophy of inferior nasal turbinate    7. Mixed conductive and sensorineural hearing loss of left ear with restricted hearing of right ear    8. Chronic sinusitis, unspecified location  -     CT Sinus Without Contrast; Future    9. Anticoagulated        The plan is to follow his perforation.  We discussed surgery versus observation, and he is doing well with the hearing at this time, is not interested in repair. He is more concerned with his sinonasal complaints and therefore we will have him continue his Flonase and Astelin, have him use nasal rinses and get a CT scan of the sinuses we will have him follow up in 2 to 3 months.    Return in about 3 months (around 7/12/2023).          Transcribed from ambient dictation for Nathan Chen MD by Lula Porter.  04/12/23   12:22 CDT    Patient or patient representative verbalized consent to the visit recording.  I have personally performed the services described in this document as transcribed by the above individual, and it is both accurate and complete.  Nathan Chen MD  4/12/2023  13:59 CDT

## 2023-04-12 NOTE — PROGRESS NOTES
AUDIOMETRIC EVALUATION        Name:  Ricardo Hugo  :  1950  Age:  73 y.o.  Date of Evaluation:  2023         History:  Reason for visit:  Mr. Hugo is seen today at the request of Nathan Chen MD for a hearing evaluation. Patient was seen by ENT provider on 2022 for bilateral eustachian tube dysfunction. Patient reports he has difficulty hearing, with the left ear worse than the right. He states he has a hole in his left eardrum and a tube in the right ear. He reports he has had the eardrum perforation for about 2 years. Previous audio was about a year ago at [facility].      PE Tubes:  yes, right ear 2022  Other otologic surgical history: no, both ears  Tinnitus:  yes, constant ringing in left ear  Dizziness:  no  Noise Exposure: yes, occupational noise without hearing protection, guns (right-handed) did not wear protection when younger, but now he does  Aural Fullness:  no, both ears  Otalgia: no, both ears  Family history of hearing loss: no  Other significant history: high blood pressure, open heart surgery , stage 4 kidney disease  Head trauma requiring hospital stay: no  Previous brain MRI: no        EVALUATION:          RESULTS:     Otoscopic Evaluation:  Right: clear canal, tympanic membrane visualized and PE tube visualized  Left: clear canal, tympanic membrane visualized and perforation visualized         NOTE: Testing completed after ears were examined by ENT provider     Tympanometry (226 Hz):  Bilateral: could not seal     Pure Tone Audiometry (via inserts with good reliability):    Right: mild rising to normal at .75-1.5kHz, precipitously sloping to moderately severe mixed hearing loss  Left: severe rising to moderate at 1kHz, precipitously sloping to profound mixed hearing loss with no response at 6-8kHz     NOTE: could not plateau right ear masked bone at .25kHz due to equipment limitations     Speech Audiometry:   Right: Speech Reception Threshold (SRT) was  obtained at 35 dBHL  Word Recognition scores- good (78 - 88%) using NU-6 List 4A, 25 words  Left: Speech Reception Threshold (SRT) was obtained at 75 dBHL  Word Recognition scores- fair (66 - 76%) using NU-6 List 4A, 25 words     IMPRESSIONS:  Could not seal tympanometry for the right ear, likely due to patent PE tube and could not seal tympanometry for the left ear, likely due to perforation. Pure tone thresholds for the right ear show a mild rising to normal, precipitously sloping to moderately severe mixed hearing loss and the pure tone thresholds for the left ear show a severe rising to moderate, precipitously sloping to profound mixed hearing loss. Results suggest abnormal outer/middle ear function and abnormal cochlear/retrocochlear function, bilaterally. Left ear air pure tones are significantly worse than the right at all test frequencies; however, left ear bone thresholds are significantly worse than the right at 2-4kHz. Patient was counseled with regard to the findings.    Amplification needs:  Patient could benefit from hearing aid. Patient's word recognition scores were excellent and fair.     Diagnosis:   1. Mixed conductive and sensorineural hearing loss of both ears    2. Tinnitus of left ear    3. Perforation of left tympanic membrane    4. S/P myringotomy with insertion of tube    5. Dysfunction of both eustachian tubes        RECOMMENDATIONS/PLAN:  Follow-up recommendations per Nathan Chen MD    Audiologic follow-up after medical intervention or in 6 months  Return for audiologic testing if noticing changes in hearing or concerns arise  Hearing aid evaluation and counseling upon medical clearance and patient motivation  Use communication strategies  Use hearing protection around loud noises  Avoid silence when possible. Sleep with white noise/fan, or listen to nature sounds      EDUCATION:  Discussed results and recommendations with patient. Questions were addressed and the patient was  encouraged to contact our department should concerns arise.       Klever Wolfe [facility]  Licensed Audiologist

## 2023-04-14 ENCOUNTER — OFFICE VISIT (OUTPATIENT)
Dept: ONCOLOGY | Facility: CLINIC | Age: 73
End: 2023-04-14
Payer: MEDICARE

## 2023-04-14 ENCOUNTER — LAB (OUTPATIENT)
Dept: LAB | Facility: HOSPITAL | Age: 73
End: 2023-04-14
Payer: MEDICARE

## 2023-04-14 VITALS
HEART RATE: 48 BPM | SYSTOLIC BLOOD PRESSURE: 132 MMHG | HEIGHT: 72 IN | WEIGHT: 162 LBS | DIASTOLIC BLOOD PRESSURE: 58 MMHG | RESPIRATION RATE: 18 BRPM | BODY MASS INDEX: 21.94 KG/M2 | TEMPERATURE: 97.2 F | OXYGEN SATURATION: 97 %

## 2023-04-14 DIAGNOSIS — C91.10 CLL (CHRONIC LYMPHOCYTIC LEUKEMIA): Primary | ICD-10-CM

## 2023-04-14 DIAGNOSIS — D50.9 IRON DEFICIENCY ANEMIA, UNSPECIFIED IRON DEFICIENCY ANEMIA TYPE: ICD-10-CM

## 2023-04-14 DIAGNOSIS — K70.30 ALCOHOLIC CIRRHOSIS, UNSPECIFIED WHETHER ASCITES PRESENT: ICD-10-CM

## 2023-04-14 DIAGNOSIS — N18.30 ANEMIA DUE TO STAGE 3 CHRONIC KIDNEY DISEASE, UNSPECIFIED WHETHER STAGE 3A OR 3B CKD: Primary | ICD-10-CM

## 2023-04-14 DIAGNOSIS — D63.1 ANEMIA DUE TO STAGE 3 CHRONIC KIDNEY DISEASE, UNSPECIFIED WHETHER STAGE 3A OR 3B CKD: Primary | ICD-10-CM

## 2023-04-14 DIAGNOSIS — N18.4 ANEMIA DUE TO STAGE 4 CHRONIC KIDNEY DISEASE: ICD-10-CM

## 2023-04-14 DIAGNOSIS — D63.1 ANEMIA DUE TO STAGE 4 CHRONIC KIDNEY DISEASE: ICD-10-CM

## 2023-04-14 LAB
ALBUMIN SERPL-MCNC: 4.3 G/DL (ref 3.5–5.2)
ALBUMIN/GLOB SERPL: 1.7 G/DL
ALP SERPL-CCNC: 153 U/L (ref 39–117)
ALT SERPL W P-5'-P-CCNC: 16 U/L (ref 1–41)
ANION GAP SERPL CALCULATED.3IONS-SCNC: 15 MMOL/L (ref 5–15)
AST SERPL-CCNC: 19 U/L (ref 1–40)
BASOPHILS # BLD AUTO: 0.04 10*3/MM3 (ref 0–0.2)
BASOPHILS NFR BLD AUTO: 0.8 % (ref 0–1.5)
BILIRUB SERPL-MCNC: 0.5 MG/DL (ref 0–1.2)
BUN SERPL-MCNC: 52 MG/DL (ref 8–23)
BUN/CREAT SERPL: 18.4 (ref 7–25)
CALCIUM SPEC-SCNC: 8.9 MG/DL (ref 8.6–10.5)
CHLORIDE SERPL-SCNC: 104 MMOL/L (ref 98–107)
CO2 SERPL-SCNC: 19 MMOL/L (ref 22–29)
CREAT SERPL-MCNC: 2.83 MG/DL (ref 0.76–1.27)
DEPRECATED RDW RBC AUTO: 54.9 FL (ref 37–54)
EGFRCR SERPLBLD CKD-EPI 2021: 22.5 ML/MIN/1.73
EOSINOPHIL # BLD AUTO: 0.26 10*3/MM3 (ref 0–0.4)
EOSINOPHIL NFR BLD AUTO: 5.2 % (ref 0.3–6.2)
ERYTHROCYTE [DISTWIDTH] IN BLOOD BY AUTOMATED COUNT: 15.7 % (ref 12.3–15.4)
FERRITIN SERPL-MCNC: 282.4 NG/ML (ref 30–400)
GLOBULIN UR ELPH-MCNC: 2.5 GM/DL
GLUCOSE SERPL-MCNC: 140 MG/DL (ref 65–99)
HCT VFR BLD AUTO: 32.7 % (ref 37.5–51)
HGB BLD-MCNC: 10.1 G/DL (ref 13–17.7)
HOLD SPECIMEN: NORMAL
IMM GRANULOCYTES # BLD AUTO: 0.05 10*3/MM3 (ref 0–0.05)
IMM GRANULOCYTES NFR BLD AUTO: 1 % (ref 0–0.5)
IRON 24H UR-MRATE: 64 MCG/DL (ref 59–158)
IRON SATN MFR SERPL: 24 % (ref 20–50)
LYMPHOCYTES # BLD AUTO: 1.15 10*3/MM3 (ref 0.7–3.1)
LYMPHOCYTES NFR BLD AUTO: 23.1 % (ref 19.6–45.3)
MCH RBC QN AUTO: 31 PG (ref 26.6–33)
MCHC RBC AUTO-ENTMCNC: 30.9 G/DL (ref 31.5–35.7)
MCV RBC AUTO: 100.3 FL (ref 79–97)
MONOCYTES # BLD AUTO: 0.66 10*3/MM3 (ref 0.1–0.9)
MONOCYTES NFR BLD AUTO: 13.3 % (ref 5–12)
NEUTROPHILS NFR BLD AUTO: 2.82 10*3/MM3 (ref 1.7–7)
NEUTROPHILS NFR BLD AUTO: 56.6 % (ref 42.7–76)
NRBC BLD AUTO-RTO: 0 /100 WBC (ref 0–0.2)
PLATELET # BLD AUTO: 122 10*3/MM3 (ref 140–450)
PMV BLD AUTO: 9.6 FL (ref 6–12)
POTASSIUM SERPL-SCNC: 4.4 MMOL/L (ref 3.5–5.2)
PROT SERPL-MCNC: 6.8 G/DL (ref 6–8.5)
RBC # BLD AUTO: 3.26 10*6/MM3 (ref 4.14–5.8)
SODIUM SERPL-SCNC: 138 MMOL/L (ref 136–145)
TIBC SERPL-MCNC: 262 MCG/DL (ref 298–536)
TRANSFERRIN SERPL-MCNC: 176 MG/DL (ref 200–360)
WBC NRBC COR # BLD: 4.98 10*3/MM3 (ref 3.4–10.8)

## 2023-04-14 PROCEDURE — 82728 ASSAY OF FERRITIN: CPT

## 2023-04-14 PROCEDURE — 83540 ASSAY OF IRON: CPT

## 2023-04-14 PROCEDURE — 85025 COMPLETE CBC W/AUTO DIFF WBC: CPT

## 2023-04-14 PROCEDURE — 80053 COMPREHEN METABOLIC PANEL: CPT

## 2023-04-14 PROCEDURE — 84466 ASSAY OF TRANSFERRIN: CPT

## 2023-04-14 PROCEDURE — 36415 COLL VENOUS BLD VENIPUNCTURE: CPT

## 2023-04-14 NOTE — PROGRESS NOTES
[facility] HEMATOLOGY & ONCOLOGY  [address]  [facility] [address] [phone]    Patient Name: Ricardo Hugo  Encounter Date: 04/14/2023  YOB: 1950   Patient Number: [id number]    Hematology / Oncology Progress Note      HPI / REASON FOR VISIT: Ricardo Hugo is a 73 y.o. male who is followed by this office for CLL, Iron Deficiency Anemia, Chronic Kidney Disease.  He sees Dr. Rueda for nephrology.      He has received Injectafer for WALE and Retacrit for anemia in CKD in the past.  Last injection was 04/07/2023 for Hgb 9.6.    He presents to the clinic today for continued follow up.  He has no acute complaint.    He had labs drawn and results were reviewed with him today     LABS    Lab Results - Last 18 Months   Lab Units 04/14/23  0836 04/07/23  0850 03/10/23  0830 02/10/23  0957 01/27/23  0717 01/13/23  0811 12/30/22  0824 12/16/22  0831 12/02/22  0856 11/18/22  0856 10/06/22  0751 09/23/22  0800 04/29/22  1214 04/01/22  0753 10/29/21  1614 10/29/21  0502 10/28/21  1750   HEMOGLOBIN g/dL 10.1* 9.6* 9.6* 10.6* 10.9* 10.1* 10.6* 11.0* 10.1* 9.9*   < > 10.5*   < > 10.1*   < > 7.5* 8.0*   HEMATOCRIT % 32.7* 31.4* 32.0* 33.6* 32.9* 33.1* 34.3* 35.9* 32.7* 31.7*   < > 32.7*   < > 31.3*   < > 22.6* 25.5*   MCV fL 100.3* 100.6* 100.6* 96.8 97.1* 97.1* 98.3* 98.4* 99.7* 99.1*   < > 97.0   < > 95.7   < > 94.6 98.1*   WBC 10*3/mm3 4.98 4.82 4.89 5.32 4.85 6.57 5.56 5.35 4.63 5.74   < > 6.64   < > 5.40   < > 4.64 16.97*   RDW % 15.7* 14.6 14.4 14.6 14.3 14.1 14.1 14.6 14.3 13.3   < > 14.1   < > 13.6   < > 15.3 16.4*   MPV fL 9.6 10.9 10.2 9.7  --  10.1 10.1 9.8 10.1 10.2   < > 9.8   < > 9.8   < > 9.7 9.4   PLATELETS 10*3/mm3 122* 92* 105* 100* 106* 112* 115* 109* 119* 111*   < > 126*   < > 116*   < > 151 348   IMM GRAN % % 1.0*  --   --   --   --   --  0.5 0.6* 0.4 0.3  --  1.1*   < >  --    < >  --   --    NEUTROS ABS 10*3/mm3  2.82  --   --   --  3.21  --  3.53 3.40 2.96 3.66  --  3.97   < > 3.76   < > 4.04 13.71*   LYMPHS ABS 10*3/mm3 1.15  --   --   --  0.92  --  1.21 1.14 0.86 1.21  --  1.61   < >  --    < >  --   --    MONOS ABS 10*3/mm3 0.66  --   --   --  0.52  --  0.52 0.62 0.59 0.68  --  0.77   < >  --    < >  --   --    EOS ABS 10*3/mm3 0.26  --   --   --  0.14  --  0.22 0.13 0.17 0.13  --  0.18   < > 0.16   < >  --   --    BASOS ABS 10*3/mm3 0.04  --   --   --  0.04  --  0.05 0.03 0.03 0.04  --  0.04   < > 0.22*   < >  --   --    IMMATURE GRANS (ABS) 10*3/mm3 0.05  --   --   --   --   --  0.03 0.03 0.02 0.02  --  0.07*   < >  --    < >  --   --    NRBC /100 WBC 0.0  --   --   --  0.0  --  0.0 0.0 0.0 0.0  --  0.0   < >  --    < > 1.0*  --    NEUTROPHIL % %  --   --   --   --   --   --   --   --   --   --   --   --   --  69.7  --  74.0 56.6   MONOCYTES % %  --   --   --   --   --   --   --   --   --   --   --   --   --  11.1  --  7.0 6.1   BASOPHIL % %  --   --   --   --   --   --   --   --   --   --   --   --   --  4.0*  --   --   --    ATYP LYMPH % %  --   --   --   --   --   --   --   --   --   --   --   --   --  5.1*  --   --   --    ANISOCYTOSIS   --   --   --   --   --   --   --   --   --   --   --   --   --   --   --   --  Slight/1+    < > = values in this interval not displayed.       Lab Results - Last 18 Months   Lab Units 04/14/23  0836 01/27/23  0717 01/13/23  0811 11/18/22  0856 08/26/22  0845 07/01/22  0812 05/27/22  1222 04/01/22  0753 02/15/22  1447 01/10/22  0717 11/11/21  1010 11/05/21  1113 11/03/21  0537 11/02/21  0600   GLUCOSE mg/dL 140* 92 134* 116* 98 121* 114*   < > 105* 133* 128* 109* 101* 96   SODIUM mmol/L 138 143 138 141 136 139 141   < > 138 139 141 140 139 142   POTASSIUM mmol/L 4.4 4.4 4.1 4.5 4.8 4.4 4.4   < > 4.7 4.8 4.4 4.6 3.9 3.5   TOTAL CO2 mmol/L  --  21.9*  --   --   --   --   --   --   --   --   --   --   --   --    CO2 mmol/L 19.0*  --  20.0* 19.0* 20.0* 20.0* 21.0*   < > 17.0* 21.0*  17.0* 24.0 24.0 24.0   CHLORIDE mmol/L 104 108* 103 109* 105 107 105   < > 105 108* 111* 109* 108* 108*   ANION GAP mmol/L 15.0  --  15.0 13.0 11.0 12.0 15.0   < > 16.0* 10.0 13.0 7.0 7.0 10.0   CREATININE mg/dL 2.83* 2.54* 2.67* 2.44* 2.45* 2.57* 2.52*   < > 2.72* 2.58* 2.66* 2.58* 2.38* 2.46*   BUN mg/dL 52* 42* 39* 57* 53* 47* 43*   < > 56* 50* 43* 31* 21 27*   BUN / CREAT RATIO  18.4 16.5 14.6 23.4 21.6 18.3 17.1   < > 20.6 19.4 16.2 12.0 8.8 11.0   CALCIUM mg/dL 8.9 8.8 8.6 9.1 8.6 8.6 8.9   < > 8.8 8.8 8.5* 8.2* 8.0* 8.1*   EGFR IF NONAFRICN AM mL/min/1.73  --   --   --   --   --   --   --   --  23* 25* 24* 25* 27* 26*   ALK PHOS U/L 153* 156* 160* 172* 135* 156* 168*   < > 183* 191* 197* 189* 132* 130*   TOTAL PROTEIN g/dL 6.8  --  6.8 7.0 6.7 7.0 7.4   < > 7.1 7.3 6.5 5.7* 4.9* 5.3*   ALT (SGPT) U/L 16 19 21 20 14 17 18   < > 20 19 17 16 11 9   AST (SGOT) U/L 19 20 20 19 13 14 18   < > 18 20 22 28 24 20   BILIRUBIN mg/dL 0.5 0.4 0.4 0.3 0.4 0.3 0.2   < > 0.3 0.3 0.2 <0.2 0.2 0.2   ALBUMIN g/dL 4.3 4.3 4.3 4.30 3.80 4.10 4.30   < > 4.20 4.30 3.80 3.30* 2.50* 3.00*   GLOBULIN gm/dL 2.5  --  2.5 2.7 2.9 2.9 3.1   < > 2.9 3.0 2.7 2.4 2.4 2.3    < > = values in this interval not displayed.       Lab Results - Last 18 Months   Lab Units 10/29/21  1201 10/28/21  1750   URIC ACID mg/dL 9.2*  --    LDH U/L  --  166       Lab Results - Last 18 Months   Lab Units 04/14/23  0836 01/27/23  0717 01/13/23  0811 11/18/22  0856 07/01/22  0812 05/27/22  1222 04/29/22  1214 11/03/21  0537 10/29/21  0502   IRON mcg/dL 64  --  76 56* 94 94 128   < >  --    TIBC mcg/dL 262*  --  244* 277* 288* 298 241*   < >  --    IRON SATURATION % 24  --  31 20 33 32 53*   < >  --    FERRITIN ng/mL 282.40  --  435.60* 167.60 254.20 281.30 298.80   < >  --    TSH uIU/mL  --  2.830  --   --   --   --   --   --  0.898    < > = values in this interval not displayed.         PAST MEDICAL HISTORY:  ALLERGIES:  Allergies   Allergen Reactions   •  Ondansetron Anaphylaxis and GI Intolerance   • Zofran [Ondansetron Hcl] Anaphylaxis   • Lortab [Hydrocodone-Acetaminophen] Other (See Comments) and Hallucinations     CLOSTROPHOBIC   • Allopurinol Other (See Comments)     Pain on right side     CURRENT MEDICATIONS:  Outpatient Encounter Medications as of 4/14/2023   Medication Sig Dispense Refill   • albuterol sulfate  (90 Base) MCG/ACT inhaler USE 2 INHALATIONS FOUR TIMES A DAY AS NEEDED 20.1 g 3   • ALPRAZolam (XANAX) 0.25 MG tablet TAKE 1 TABLET BY MOUTH AT NIGHT AS NEEDED FOR ANXIETY 30 tablet 2   • amLODIPine (NORVASC) 10 MG tablet Take 1 tablet by mouth Daily. 90 tablet 3   • aspirin (aspirin) 81 MG EC tablet Take  by mouth Daily.     • atorvastatin (LIPITOR) 10 MG tablet Take 1 tablet by mouth Daily. 90 tablet 3   • azelastine (ASTELIN) 0.1 % nasal spray USE 1 SPRAY IN EACH NOSTRIL TWICE A DAY AS NEEDED 90 mL 1   • carvedilol (Coreg) 6.25 MG tablet Take 1 tablet by mouth 2 (Two) Times a Day With Meals. 60 tablet 2   • cholestyramine (QUESTRAN) 4 g packet Take 1 packet by mouth Daily. 90 packet 3   • cloNIDine (Catapres) 0.3 MG tablet Take 1 tablet by mouth 3 (Three) Times a Day for 90 days. 90 tablet 2   • clopidogrel (PLAVIX) 75 MG tablet TAKE 1 TABLET DAILY 90 tablet 3   • Copper Gluconate (Copper Caps) 2 MG capsule Take  by mouth.     • diphenhydrAMINE (BENADRYL) 25 mg capsule Take 1 capsule by mouth Every 6 (Six) Hours As Needed for Itching.     • fexofenadine (ALLEGRA) 180 MG tablet Take 1 tablet by mouth Daily.     • fluticasone (FLONASE) 50 MCG/ACT nasal spray 2 sprays into the nostril(s) as directed by provider Daily As Needed for Rhinitis.     • furosemide (Lasix) 20 MG tablet Take 1 tablet by mouth 2 (Two) Times a Day. 180 tablet 3   • hydrALAZINE (APRESOLINE) 25 MG tablet Take 4 tablets by mouth 3 (Three) Times a Day. 100mg daily     • levothyroxine (Synthroid) 75 MCG tablet Take 1 tablet by mouth Daily. 90 tablet 3   • magnesium chloride ER  64 MG DR tablet Take 143 mg by mouth Daily.     • Melatonin 10 MG capsule Take 2 capsules by mouth Every Night.     • mesalamine (APRISO) 0.375 g 24 hr capsule Take 4 capsules by mouth Daily. 360 capsule 3   • montelukast (SINGULAIR) 10 MG tablet TAKE 1 TABLET BY MOUTH EVERY NIGHT 30 tablet 11   • Multiple Vitamins-Minerals (PRESERVISION/LUTEIN) capsule Take 1 capsule by mouth 2 (two) times a day.     • omeprazole (priLOSEC) 20 MG capsule TAKE 1 CAPSULE DAILY 90 capsule 1   • Oxymetazoline HCl (Nasal Spray) 0.05 % solution      • potassium chloride 10 MEQ CR tablet Take 1 tablet by mouth 2 (Two) Times a Day. 180 tablet 3   • sodium bicarbonate 650 MG tablet Take 1 tablet by mouth 3 (Three) Times a Day. 270 tablet 3   • valsartan (DIOVAN) 160 MG tablet Take 1 tablet by mouth Daily. 90 tablet 3   • vitamin D (ERGOCALCIFEROL) 1.25 MG (25660 UT) capsule capsule Take 1 capsule by mouth 1 (One) Time Per Week. 15 capsule 3   • cefdinir (OMNICEF) 300 MG capsule Take 1 capsule by mouth Daily. (Patient not taking: Reported on 4/12/2023) 10 capsule 0     No facility-administered encounter medications on file as of 4/14/2023.     ADULT ILLNESSES:  Patient Active Problem List   Diagnosis Code   • Chronic rhinitis J31.0   • Resistant hypertension I10   • Chronic diarrhea K52.9   • Crohn's disease of large intestine with other complication K50.118   • Symptomatic anemia D64.9   • CKD (chronic kidney disease) stage 4, GFR 15-29 ml/min N18.4   • Bruit of right carotid artery R09.89   • Wellness examination Z00.00   • PAD (peripheral artery disease) I73.9   • IHD (ischemic heart disease) I25.9   • Carotid stenosis I65.29   • Stenosis of right carotid artery I65.21   • Carotid stenosis, right I65.21   • Diastolic dysfunction I51.89   • CRD (chronic renal disease), stage IV N18.4   • Anemia due to chronic kidney disease N18.9, D63.1   • Copper deficiency E61.0   • Low iron  E61.1   • CLL (chronic lymphocytic leukemia) (HCC) C91.10   •  Pulmonary hypertension I27.20   • Perforation of left tympanic membrane H72.92   • Mixed hyperlipidemia E78.2   • Systolic murmur R01.1   • Eustachian tube dysfunction, bilateral H69.83   • Sensorineural hearing loss (SNHL), bilateral H90.3   • Anticoagulated Z79.01   • Nasal septal deviation J34.2   • Hypertrophy of inferior nasal turbinate J34.3     SURGERIES:  Past Surgical History:   Procedure Laterality Date   • ARTERY SURGERY  2021    right side on neck   • CAROTID ENDARTERECTOMY Right 5/10/2021    Procedure: RIGHT CAROTID ENDARTERECTOMY WITH EEG;  Surgeon: Gil Pineda DO;  Location: [facility] HYBRID OR 12;  Service: Vascular;  Laterality: Right;   • COLONOSCOPY N/A 7/2/2020    Procedure: COLONOSCOPY WITH ANESTHESIA;  Surgeon: Adrien Brewster MD;  Location: [facility] ENDOSCOPY;  Service: Gastroenterology;  Laterality: N/A;  pre op: diarrhea  post op: polyps  PCP: Joe Velasco MD   • COLONOSCOPY N/A 10/13/2020    Procedure: COLONOSCOPY WITH ANESTHESIA;  Surgeon: Adrien Brewster MD;  Location: [facility] ENDOSCOPY;  Service: Gastroenterology;  Laterality: N/A;  Pre: Chronic Diarrhea, Crohn's  Post: AVM  Dr. Neftali Velasco  CO2 Inflation Used   • CORONARY ARTERY BYPASS GRAFT  2003    x3   • ENDOSCOPY N/A 11/2/2021    Procedure: ESOPHAGOGASTRODUODENOSCOPY WITH ANESTHESIA;  Surgeon: Bridger Bell MD;  Location: [facility] ENDOSCOPY;  Service: Gastroenterology;  Laterality: N/A;  pre anemia;gi bleed  post  gi bleed;schatski ring  Dr. ERIC Velasco   • EYE SURGERY Bilateral     catorac   • INCISION AND DRAINAGE PERIRECTAL ABSCESS N/A 3/3/2017    Procedure: INCISION AND DRAINAGE OF JEET ANAL ABSCESS;  Surgeon: Lynette Smith MD;  Location: [facility] OR;  Service:    • MYRINGOTOMY W/ TUBES Left 04/17/2017    06/10/2016   • TONSILLECTOMY     • TOTAL HIP ARTHROPLASTY Right 2006     HEALTH MAINTENANCE ITEMS:  Health Maintenance Due   Topic Date Due   • TDAP/TD VACCINES (1 - Tdap) Never done   • Hepatitis B (1 of 3 - Risk  3-dose series) Never done   • Pneumococcal Vaccine 65+ (3 - PPSV23 if available, else PCV20) 2015       <no information>  Last Completed Colonoscopy          COLORECTAL CANCER SCREENING (COLONOSCOPY - Every 3 Years) Next due on 10/13/2023    10/28/2021  POC Occult Blood Stool    10/13/2020  Surgical Procedure: COLONOSCOPY    10/13/2020  COLONOSCOPY    2020  Surgical Procedure: COLONOSCOPY    2020  COLONOSCOPY    Only the first 5 history entries have been loaded, but more history exists.              Immunization History   Administered Date(s) Administered   • COVID-19 (MODERNA) 1st, 2nd, 3rd Dose Only 2021, 2021, 2021, 2022   • COVID-19 (MODERNA) BIVALENT BOOSTER 12+YRS 2022   • Flu Vaccine Split Quad 2019   • FluLaval/Fluzone >6mos 2020   • Fluzone High-Dose 65+yrs 10/12/2021, 2022   • Fluzone Quad >6mos (Multi-dose) 2018, 2019, 2020   • Influenza TIV (IM) 10/18/2017   • Influenza Whole 2015   • Pneumococcal Conjugate 13-Valent (PCV13) 2014   • Pneumococcal Polysaccharide (PPSV23) 2006   • Shingrix 2021, 2022   • Zostavax 2014     Last Completed Mammogram     This patient has no relevant Health Maintenance data.            FAMILY HISTORY:  Family History   Problem Relation Age of Onset   • No Known Problems Mother    • No Known Problems Father    • No Known Problems Daughter    • Colon cancer Neg Hx    • Colon polyps Neg Hx      SOCIAL HISTORY:  Social History     Socioeconomic History   • Marital status:    Tobacco Use   • Smoking status: Former     Packs/day: 0.50     Years: 25.00     Pack years: 12.50     Types: Cigarettes     Start date:      Quit date: 10/13/2013     Years since quittin.5   • Smokeless tobacco: Never   • Tobacco comments:     quit    Vaping Use   • Vaping Use: Never used   Substance and Sexual Activity   • Alcohol use: Not Currently   • Drug use: No   •  "Sexual activity: Not Currently     Partners: Female       REVIEW OF SYSTEMS:  Review of Systems   Constitutional: Negative for activity change, appetite change, fatigue, fever, unexpected weight gain and unexpected weight loss.   HENT: Negative for dental problem, facial swelling, swollen glands and trouble swallowing.    Eyes: Negative for double vision and discharge.   Respiratory: Negative for cough, shortness of breath and wheezing.    Cardiovascular: Negative for chest pain, palpitations and leg swelling.   Gastrointestinal: Negative for abdominal pain, blood in stool, nausea and vomiting.   Endocrine: Negative.    Genitourinary: Negative for dysuria and hematuria.   Musculoskeletal: Negative for arthralgias and myalgias.   Skin: Negative for rash, skin lesions and wound.   Allergic/Immunologic: Negative for immunocompromised state.   Neurological: Negative for speech difficulty, light-headedness, headache, memory problem and confusion.   Hematological: Negative for adenopathy.   Psychiatric/Behavioral: Negative for self-injury, suicidal ideas and depressed mood. The patient is not nervous/anxious.        /58   Pulse (!) 48   Temp 97.2 °F (36.2 °C) (Temporal)   Resp 18   Ht 182.9 cm (72.01\")   Wt 73.5 kg (162 lb)   SpO2 97%   BMI 21.97 kg/m²  Body surface area is 1.95 meters squared.    Pain Score    04/14/23 0919   PainSc: 0-No pain       Physical Exam  Constitutional:       Appearance: Normal appearance.   HENT:      Head: Normocephalic and atraumatic.   Cardiovascular:      Rate and Rhythm: Normal rate and regular rhythm.   Pulmonary:      Effort: Pulmonary effort is normal.      Breath sounds: Normal breath sounds.   Abdominal:      General: Bowel sounds are normal.      Palpations: Abdomen is soft.   Musculoskeletal:      Right lower leg: No edema.      Left lower leg: No edema.   Skin:     General: Skin is warm and dry.   Neurological:      Mental Status: He is alert and oriented to person, " place, and time.   Psychiatric:         Attention and Perception: Attention normal.         Mood and Affect: Mood normal.         Judgment: Judgment normal.         Ricardo DUMAS Bethel reports a pain score of 0.  No intervention indicated         ASSESSMENT / PLAN    1. CLL (chronic lymphocytic leukemia)    2. Anemia due to stage 4 chronic kidney disease    3. Iron deficiency anemia, unspecified iron deficiency anemia type    4. Alcoholic cirrhosis, unspecified whether ascites present       ASSESSMENT:      retacrit feb 17  injectafer dec 15    1.  CLL  - Clinical stage 0 from 7/20/2021:  - Lymphocytosis: Absent, Adenopathy: Absent, Organomegaly: Absent,   Anemia: Present, Thrombocytopenia: Present  Labs today with WBC 4.98, Absolute counts are normal   -No unintentional weight loss, fever or night sweats   PLAN:  Stable for observation    2.  Anemia in Chronic Kidney Disease   -BUN 62, Creatinine 2.83, GFR 22.5  -Hgb 10.1, Hct 32.7,  -He received Retacrit 04/07/23  Continue Retacrit monthly for Hgb < 11 Or HCT < 33   -Sees Dr. Francis, Nephrology    3.  Iron Deficiency Anemia   -Hgb 10., Hct 32.7  -Serum iron 64, Ferritin 282, saturation 24%, TIBC 262  -Had Injectafer December 1, 2022  -Stable for observation     4. Cirrhosis of Liver  -History of chronic alcoholism  - Cirrhotic morphology/appearance of the liver on 07/12/21 ultrasound  - Alk phos 153  -PLAN:  Stable for observation     PLAN:  Continue Retacrit 40K units  monthly for Hgb < 11 or Hct < 33  -Continue current medications, treatment plans and follow up with PCP and any other specialist  Return to office in 3 months with CBC, CMP, Iron Profile and Ferritin   Care discussed with patient.  Understanding expressed.  Patient agreeable with plan.      Becky Camacho, APRN  04/14/2023

## 2023-04-26 DIAGNOSIS — N18.4 CKD (CHRONIC KIDNEY DISEASE) STAGE 4, GFR 15-29 ML/MIN: ICD-10-CM

## 2023-04-26 RX ORDER — FUROSEMIDE 20 MG/1
TABLET ORAL
Qty: 180 TABLET | Refills: 3 | Status: SHIPPED | OUTPATIENT
Start: 2023-04-26

## 2023-04-27 ENCOUNTER — OFFICE VISIT (OUTPATIENT)
Dept: INTERNAL MEDICINE | Facility: CLINIC | Age: 73
End: 2023-04-27
Payer: MEDICARE

## 2023-04-27 VITALS
TEMPERATURE: 97.6 F | BODY MASS INDEX: 21.81 KG/M2 | SYSTOLIC BLOOD PRESSURE: 152 MMHG | WEIGHT: 161 LBS | DIASTOLIC BLOOD PRESSURE: 60 MMHG | HEIGHT: 72 IN | HEART RATE: 48 BPM | OXYGEN SATURATION: 99 %

## 2023-04-27 DIAGNOSIS — Z79.01 ANTICOAGULATED: ICD-10-CM

## 2023-04-27 DIAGNOSIS — I10 RESISTANT HYPERTENSION: ICD-10-CM

## 2023-04-27 DIAGNOSIS — N25.81 SECONDARY HYPERPARATHYROIDISM OF RENAL ORIGIN: ICD-10-CM

## 2023-04-27 DIAGNOSIS — I51.89 DIASTOLIC DYSFUNCTION: ICD-10-CM

## 2023-04-27 DIAGNOSIS — R01.1 SYSTOLIC MURMUR: Primary | Chronic | ICD-10-CM

## 2023-04-27 DIAGNOSIS — C91.10 CLL (CHRONIC LYMPHOCYTIC LEUKEMIA): ICD-10-CM

## 2023-04-27 DIAGNOSIS — E78.2 MIXED HYPERLIPIDEMIA: ICD-10-CM

## 2023-04-27 DIAGNOSIS — N18.4 CRD (CHRONIC RENAL DISEASE), STAGE IV: ICD-10-CM

## 2023-04-27 DIAGNOSIS — F41.9 ANXIETY: ICD-10-CM

## 2023-04-27 PROBLEM — K50.118 CROHN'S DISEASE OF LARGE INTESTINE WITH OTHER COMPLICATION: Status: RESOLVED | Noted: 2020-07-30 | Resolved: 2023-04-27

## 2023-04-27 PROBLEM — I27.20 PULMONARY HYPERTENSION: Status: RESOLVED | Noted: 2021-07-21 | Resolved: 2023-04-27

## 2023-04-27 RX ORDER — ALPRAZOLAM 0.25 MG/1
0.25 TABLET ORAL NIGHTLY PRN
Qty: 30 TABLET | Refills: 2 | Status: SHIPPED | OUTPATIENT
Start: 2023-04-27

## 2023-04-27 NOTE — PROGRESS NOTES
"      Chief Complaint  Follow-up (1 month follow up )    Subjective        Ricardo Hugo presents to [facility]    HPI    Patient presents for follow-up on his hypertension as well as his chronic kidney disease.  Patient is going to see nephrology on May 15.  I am going to communicate back-and-forth with the STERLING Freeman, we may want to consider switching his amlodipine to nifedipine for a little bit blood pressure control.  The patient's blood pressure has been better controlled, the patient brought in a blood pressure log with daily readings, at various times.    Patient is also following with Dr. Larios for his chronic sinusitis.  Patient is getting his CT sinuses soon to see if there is any improvement in this.    Review of Systems   Constitutional:  Negative for activity change and appetite change.   Respiratory:  Negative for cough and shortness of breath.    Cardiovascular:  Negative for chest pain and palpitations.     Objective   Vital Signs:  /60 (BP Location: Left arm, Patient Position: Sitting, Cuff Size: Adult) Comment: pt had medication at around 2am  Pulse (!) 48   Temp 97.6 °F (36.4 °C) (Temporal)   Ht 182.9 cm (72\")   Wt 73 kg (161 lb)   SpO2 99%   BMI 21.84 kg/m²   Estimated body mass index is 21.84 kg/m² as calculated from the following:    Height as of this encounter: 182.9 cm (72\").    Weight as of this encounter: 73 kg (161 lb).      Physical Exam  Vitals reviewed.   Constitutional:       Appearance: Normal appearance.   Cardiovascular:      Rate and Rhythm: Normal rate and regular rhythm.      Heart sounds: Murmur heard.   Pulmonary:      Effort: Pulmonary effort is normal. No respiratory distress.   Musculoskeletal:      Right lower leg: No edema.      Left lower leg: No edema.   Neurological:      General: No focal deficit present.      Mental Status: He is alert and oriented to person, place, and time.   Psychiatric:         Mood and " Affect: Mood normal.         Behavior: Behavior normal.                 Assessment and Plan   Diagnoses and all orders for this visit:    1. Systolic murmur (Primary)    2. Resistant hypertension    3. Diastolic dysfunction    4. Anticoagulated    5. Mixed hyperlipidemia    6. CRD (chronic renal disease), stage IV    7. CLL (chronic lymphocytic leukemia) (HCC)    8. Secondary hyperparathyroidism of renal origin      Patient overall is doing well.  The patient's blood pressures are better controlled.  Patient still has some intermittent 150 systolic pressures.  Patient is on extensive blood pressure medications including valsartan, hydralazine, carvedilol, amlodipine, and clonidine.    We may be able to get a little bit more blood pressure effect from switching from amlodipine to nifedipine.  I am going to discuss this with Slava with nephrology.  His insurance declined Farxiga, and instead preferred Jardiance.  As far as I know Jardiance has not released there data in regards to chronic kidney disease, and therefore is not approved for chronic kidney disease on its own.  We will plan to check into this a little bit further.  I do expect that [employer] will have data regarding chronic kidney disease in the near future.    Patient does have CLL.  Patient having no issues.    Patient does have secondary hyperparathyroidism of relation to his chronic kidney disease.  This has been stable.  No specific treatment related to this for now.    Since patient is going to get labs with nephrology, I have no intention of ordering labs today.    Discussed case with STERLING Freeman.  Consider amlodipine and nifedine.  He also has not heard of any recent information on jardiance for CKD.      Result Review :  The following data was reviewed by: Nathan Zimmer MD on 04/27/2023:  CMP          1/13/2023    08:11 1/27/2023    07:17 4/14/2023    08:36   CMP   Glucose 134   92   140     BUN 39   42   52     Creatinine 2.67    2.54   2.83     EGFR 24.3    22.5     Sodium 138   143   138     Potassium 4.1   4.4   4.4     Chloride 103   108   104     Calcium 8.6   8.8   8.9     Total Protein  6.5      Total Protein 6.8    6.8     Albumin 4.3   4.3   4.3     Globulin  2.2      Globulin 2.5    2.5     Total Bilirubin 0.4   0.4   0.5     Alkaline Phosphatase 160   156   153     AST (SGOT) 20   20   19     ALT (SGPT) 21   19   16     Albumin/Globulin Ratio 1.7    1.7     BUN/Creatinine Ratio 14.6   16.5   18.4     Anion Gap 15.0    15.0       CBC w/diff          3/10/2023    08:30 4/7/2023    08:50 4/14/2023    08:36   CBC w/Diff   WBC 4.89   4.82   4.98     RBC 3.18   3.12   3.26     Hemoglobin 9.6   9.6   10.1     Hematocrit 32.0   31.4   32.7     .6   100.6   100.3     MCH 30.2   30.8   31.0     MCHC 30.0   30.6   30.9     RDW 14.4   14.6   15.7     Platelets 105   92   122     Neutrophil Rel %   56.6     Immature Granulocyte Rel %   1.0     Lymphocyte Rel %   23.1     Monocyte Rel %   13.3     Eosinophil Rel %   5.2     Basophil Rel %   0.8       Lipid Panel          1/27/2023    07:17   Lipid Panel   Total Cholesterol 101     Triglycerides 80     HDL Cholesterol 40     VLDL Cholesterol 16     LDL Cholesterol  45       TSH          1/27/2023    07:17   TSH   TSH 2.830       BMP          1/13/2023    08:11 1/27/2023    07:17 4/14/2023    08:36   BMP   BUN 39   42   52     Creatinine 2.67   2.54   2.83     Sodium 138   143   138     Potassium 4.1   4.4   4.4     Chloride 103   108   104     CO2 20.0   21.9   19.0     Calcium 8.6   8.8   8.9                BMI is within normal parameters. No other follow-up for BMI required.           Follow Up   Return in about 3 months (around 7/27/2023).  Patient was given instructions and counseling regarding his condition or for health maintenance advice. Please see specific information pulled into the AVS if appropriate.       MAHENDRA Zimmer MD, FACP, FHM      Electronically signed by Nathan Pereira  MD Mesha, 04/27/23, 12:47 PM CDT.

## 2023-04-28 ENCOUNTER — HOSPITAL ENCOUNTER (OUTPATIENT)
Dept: CT IMAGING | Facility: HOSPITAL | Age: 73
Discharge: HOME | End: 2023-04-28
Payer: MEDICARE

## 2023-04-28 DIAGNOSIS — J34.2 NASAL SEPTAL DEVIATION: ICD-10-CM

## 2023-04-28 DIAGNOSIS — J32.9 CHRONIC SINUSITIS, UNSPECIFIED LOCATION: ICD-10-CM

## 2023-04-28 DIAGNOSIS — J31.0 CHRONIC RHINITIS: ICD-10-CM

## 2023-04-28 PROCEDURE — 70486 CT MAXILLOFACIAL W/O DYE: CPT

## 2023-05-05 ENCOUNTER — INFUSION (OUTPATIENT)
Dept: ONCOLOGY | Facility: HOSPITAL | Age: 73
End: 2023-05-05
Payer: MEDICARE

## 2023-05-05 ENCOUNTER — LAB (OUTPATIENT)
Dept: LAB | Facility: HOSPITAL | Age: 73
End: 2023-05-05
Payer: MEDICARE

## 2023-05-05 VITALS
SYSTOLIC BLOOD PRESSURE: 160 MMHG | RESPIRATION RATE: 16 BRPM | DIASTOLIC BLOOD PRESSURE: 48 MMHG | HEIGHT: 72 IN | HEART RATE: 50 BPM | BODY MASS INDEX: 21.81 KG/M2 | OXYGEN SATURATION: 97 % | WEIGHT: 161 LBS | TEMPERATURE: 97.8 F

## 2023-05-05 DIAGNOSIS — D63.1 ANEMIA DUE TO STAGE 4 CHRONIC KIDNEY DISEASE: Primary | ICD-10-CM

## 2023-05-05 DIAGNOSIS — C91.10 CLL (CHRONIC LYMPHOCYTIC LEUKEMIA): ICD-10-CM

## 2023-05-05 DIAGNOSIS — N18.4 ANEMIA DUE TO STAGE 4 CHRONIC KIDNEY DISEASE: Primary | ICD-10-CM

## 2023-05-05 LAB
BASOPHILS # BLD AUTO: 0.04 10*3/MM3 (ref 0–0.2)
BASOPHILS NFR BLD AUTO: 0.8 % (ref 0–1.5)
DEPRECATED RDW RBC AUTO: 51.9 FL (ref 37–54)
EOSINOPHIL # BLD AUTO: 0.2 10*3/MM3 (ref 0–0.4)
EOSINOPHIL NFR BLD AUTO: 4.1 % (ref 0.3–6.2)
ERYTHROCYTE [DISTWIDTH] IN BLOOD BY AUTOMATED COUNT: 14.5 % (ref 12.3–15.4)
HCT VFR BLD AUTO: 32.6 % (ref 37.5–51)
HGB BLD-MCNC: 10.2 G/DL (ref 13–17.7)
IMM GRANULOCYTES # BLD AUTO: 0.02 10*3/MM3 (ref 0–0.05)
IMM GRANULOCYTES NFR BLD AUTO: 0.4 % (ref 0–0.5)
LYMPHOCYTES # BLD AUTO: 1.17 10*3/MM3 (ref 0.7–3.1)
LYMPHOCYTES NFR BLD AUTO: 23.8 % (ref 19.6–45.3)
MCH RBC QN AUTO: 30.6 PG (ref 26.6–33)
MCHC RBC AUTO-ENTMCNC: 31.3 G/DL (ref 31.5–35.7)
MCV RBC AUTO: 97.9 FL (ref 79–97)
MONOCYTES # BLD AUTO: 0.6 10*3/MM3 (ref 0.1–0.9)
MONOCYTES NFR BLD AUTO: 12.2 % (ref 5–12)
NEUTROPHILS NFR BLD AUTO: 2.89 10*3/MM3 (ref 1.7–7)
NEUTROPHILS NFR BLD AUTO: 58.7 % (ref 42.7–76)
NRBC BLD AUTO-RTO: 0 /100 WBC (ref 0–0.2)
PLATELET # BLD AUTO: 100 10*3/MM3 (ref 140–450)
PMV BLD AUTO: 10.3 FL (ref 6–12)
RBC # BLD AUTO: 3.33 10*6/MM3 (ref 4.14–5.8)
WBC NRBC COR # BLD: 4.92 10*3/MM3 (ref 3.4–10.8)

## 2023-05-05 PROCEDURE — 96372 THER/PROPH/DIAG INJ SC/IM: CPT

## 2023-05-05 PROCEDURE — 80048 BASIC METABOLIC PNL TOTAL CA: CPT | Performed by: INTERNAL MEDICINE

## 2023-05-05 PROCEDURE — 85025 COMPLETE CBC W/AUTO DIFF WBC: CPT

## 2023-05-05 PROCEDURE — 25010000002 EPOETIN ALFA-EPBX 40000 UNIT/ML SOLUTION: Performed by: NURSE PRACTITIONER

## 2023-05-05 RX ADMIN — EPOETIN ALFA-EPBX 40000 UNITS: 40000 INJECTION, SOLUTION INTRAVENOUS; SUBCUTANEOUS at 10:43

## 2023-05-08 RX ORDER — CLONIDINE HYDROCHLORIDE 0.2 MG/1
TABLET ORAL
Qty: 270 TABLET | Refills: 3 | OUTPATIENT
Start: 2023-05-08

## 2023-05-08 RX ORDER — METOPROLOL SUCCINATE 25 MG/1
TABLET, EXTENDED RELEASE ORAL
Qty: 90 TABLET | Refills: 3 | OUTPATIENT
Start: 2023-05-08

## 2023-05-09 ENCOUNTER — TELEPHONE (OUTPATIENT)
Dept: INTERNAL MEDICINE | Facility: CLINIC | Age: 73
End: 2023-05-09
Payer: MEDICARE

## 2023-05-09 DIAGNOSIS — N18.4 CKD (CHRONIC KIDNEY DISEASE) STAGE 4, GFR 15-29 ML/MIN: ICD-10-CM

## 2023-05-09 RX ORDER — SODIUM BICARBONATE 650 MG/1
TABLET ORAL
Qty: 450 TABLET | Refills: 3
Start: 2023-05-09

## 2023-05-09 NOTE — TELEPHONE ENCOUNTER
Called pt, at Dr. Zimmer's instruction, to instruct to increase Sodium Bicarbonate 650mg to 2 qam, 1 at lunch, and 1 qpm.  Pt voiced understanding.

## 2023-05-17 RX ORDER — VALSARTAN 320 MG/1
320 TABLET ORAL DAILY
Start: 2023-05-17

## 2023-06-02 ENCOUNTER — INFUSION (OUTPATIENT)
Dept: ONCOLOGY | Facility: HOSPITAL | Age: 73
End: 2023-06-02
Payer: MEDICARE

## 2023-06-02 ENCOUNTER — LAB (OUTPATIENT)
Dept: LAB | Facility: HOSPITAL | Age: 73
End: 2023-06-02
Payer: MEDICARE

## 2023-06-02 VITALS
TEMPERATURE: 97.2 F | HEART RATE: 52 BPM | RESPIRATION RATE: 16 BRPM | SYSTOLIC BLOOD PRESSURE: 171 MMHG | HEIGHT: 72 IN | DIASTOLIC BLOOD PRESSURE: 52 MMHG | OXYGEN SATURATION: 100 % | WEIGHT: 164.8 LBS | BODY MASS INDEX: 22.32 KG/M2

## 2023-06-02 DIAGNOSIS — D63.1 ANEMIA DUE TO STAGE 4 CHRONIC KIDNEY DISEASE: Primary | ICD-10-CM

## 2023-06-02 DIAGNOSIS — N18.4 ANEMIA DUE TO STAGE 4 CHRONIC KIDNEY DISEASE: Primary | ICD-10-CM

## 2023-06-02 DIAGNOSIS — C91.10 CLL (CHRONIC LYMPHOCYTIC LEUKEMIA): Primary | ICD-10-CM

## 2023-06-02 LAB
BASOPHILS # BLD AUTO: 0.04 10*3/MM3 (ref 0–0.2)
BASOPHILS NFR BLD AUTO: 0.9 % (ref 0–1.5)
DEPRECATED RDW RBC AUTO: 53.1 FL (ref 37–54)
EOSINOPHIL # BLD AUTO: 0.15 10*3/MM3 (ref 0–0.4)
EOSINOPHIL NFR BLD AUTO: 3.2 % (ref 0.3–6.2)
ERYTHROCYTE [DISTWIDTH] IN BLOOD BY AUTOMATED COUNT: 14.6 % (ref 12.3–15.4)
HCT VFR BLD AUTO: 33 % (ref 37.5–51)
HGB BLD-MCNC: 10.2 G/DL (ref 13–17.7)
HOLD SPECIMEN: NORMAL
IMM GRANULOCYTES # BLD AUTO: 0.01 10*3/MM3 (ref 0–0.05)
IMM GRANULOCYTES NFR BLD AUTO: 0.2 % (ref 0–0.5)
LYMPHOCYTES # BLD AUTO: 0.97 10*3/MM3 (ref 0.7–3.1)
LYMPHOCYTES NFR BLD AUTO: 20.8 % (ref 19.6–45.3)
MCH RBC QN AUTO: 30.8 PG (ref 26.6–33)
MCHC RBC AUTO-ENTMCNC: 30.9 G/DL (ref 31.5–35.7)
MCV RBC AUTO: 99.7 FL (ref 79–97)
MONOCYTES # BLD AUTO: 0.58 10*3/MM3 (ref 0.1–0.9)
MONOCYTES NFR BLD AUTO: 12.4 % (ref 5–12)
NEUTROPHILS NFR BLD AUTO: 2.91 10*3/MM3 (ref 1.7–7)
NEUTROPHILS NFR BLD AUTO: 62.5 % (ref 42.7–76)
NRBC BLD AUTO-RTO: 0 /100 WBC (ref 0–0.2)
PLATELET # BLD AUTO: 83 10*3/MM3 (ref 140–450)
PMV BLD AUTO: 10.2 FL (ref 6–12)
RBC # BLD AUTO: 3.31 10*6/MM3 (ref 4.14–5.8)
WBC NRBC COR # BLD: 4.66 10*3/MM3 (ref 3.4–10.8)

## 2023-06-02 PROCEDURE — 96372 THER/PROPH/DIAG INJ SC/IM: CPT

## 2023-06-02 PROCEDURE — 25010000002 EPOETIN ALFA-EPBX 40000 UNIT/ML SOLUTION: Performed by: NURSE PRACTITIONER

## 2023-06-02 PROCEDURE — 85025 COMPLETE CBC W/AUTO DIFF WBC: CPT

## 2023-06-02 PROCEDURE — 36415 COLL VENOUS BLD VENIPUNCTURE: CPT

## 2023-06-02 RX ADMIN — EPOETIN ALFA-EPBX 40000 UNITS: 40000 INJECTION, SOLUTION INTRAVENOUS; SUBCUTANEOUS at 09:28

## 2023-06-07 ENCOUNTER — OFFICE VISIT (OUTPATIENT)
Dept: OTOLARYNGOLOGY | Facility: CLINIC | Age: 73
End: 2023-06-07
Payer: MEDICARE

## 2023-06-07 VITALS — TEMPERATURE: 97.3 F | DIASTOLIC BLOOD PRESSURE: 59 MMHG | SYSTOLIC BLOOD PRESSURE: 172 MMHG | HEART RATE: 54 BPM

## 2023-06-07 DIAGNOSIS — J34.2 NASAL SEPTAL DEVIATION: Primary | ICD-10-CM

## 2023-06-07 DIAGNOSIS — J34.3 HYPERTROPHY OF INFERIOR NASAL TURBINATE: ICD-10-CM

## 2023-06-07 DIAGNOSIS — H72.92 PERFORATION OF LEFT TYMPANIC MEMBRANE: ICD-10-CM

## 2023-06-07 DIAGNOSIS — J31.0 CHRONIC RHINITIS: ICD-10-CM

## 2023-06-07 RX ORDER — OXYMETAZOLINE HYDROCHLORIDE 0.05 G/100ML
2 SPRAY NASAL ONCE
OUTPATIENT
Start: 2023-06-07 | End: 2023-06-07

## 2023-06-07 RX ORDER — OXYMETAZOLINE HYDROCHLORIDE 0.05 G/100ML
2 SPRAY NASAL
OUTPATIENT
Start: 2023-06-07 | End: 2023-06-07

## 2023-06-07 NOTE — PROGRESS NOTES
Nathan Chen MD  [facility]  [address], [address]  [facility] [facility] [address]  Fax [phone]  Phone [phone]      Visit Type: FOLLOW UP   Chief Complaint   Patient presents with    Nasal Congestion        HPI  Ricardo Hugo is a  73 y.o. male who is here for follow up. He has had continued problems with nasal congestion and nasal obstruction.  He has tried over-the-counter allergy and sinus medications, nasal sprays and nasal rinses without improvement in the past.  He has had it since he was a young child.  He is on Plavix and aspirin for vascular claudication.    Past Medical History:   Diagnosis Date    3-vessel CAD 8/11/2020    Allergic rhinitis     Anxiety disorder 4/27/2020    Arthritis     Asymmetrical sensorineural hearing loss 6/28/2017    Atherosclerosis of native artery of both lower extremities with intermittent claudication 7/18/2019    Avascular necrosis of femoral head, left 07/11/2020    right hip after surgery    Carotid stenosis     Chronic mucoid otitis media     Chronic rhinitis     Coronary artery disease     HEART BYPASS 2004    Crohn's disease of large intestine with other complication 7/30/2020    Chronic diarrhea Colonoscopy July 2020 revealed mild patchy scattered hemosiderin staining with inflammation more so in rectosigmoid area.  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    Iron deficiency anemia 7/14/2020    Mixed hearing loss of left ear     PAD (peripheral artery disease) 8/11/2020    Perianal abscess     Pernicious anemia 08/17/2020    took shots but never  diagnosed with b12 deficiency    Personal history of alcoholism 08/11/2020    quit drinking in 2013    Prostatic hypertrophy 8/11/2020    Sensorineural hearing loss     Sepsis with acute renal failure 9/15/2020    Shortness of breath 5/27/2021    Tinnitus     Ventricular tachycardia, nonsustained 7/14/2020    Weight loss 7/11/2020       Past Surgical History:   Procedure Laterality Date    ARTERY SURGERY  2021    right side on neck    CAROTID ENDARTERECTOMY Right 5/10/2021    Procedure: RIGHT CAROTID ENDARTERECTOMY WITH EEG;  Surgeon: Gil Pineda DO;  Location: [facility] HYBRID OR 12;  Service: Vascular;  Laterality: Right;    COLONOSCOPY N/A 7/2/2020    Procedure: COLONOSCOPY WITH ANESTHESIA;  Surgeon: Adrien Brewster MD;  Location: [facility] ENDOSCOPY;  Service: Gastroenterology;  Laterality: N/A;  pre op: diarrhea  post op: polyps  PCP: Joe Velasco MD    COLONOSCOPY N/A 10/13/2020    Procedure: COLONOSCOPY WITH ANESTHESIA;  Surgeon: Adrien Brewster MD;  Location: [facility] ENDOSCOPY;  Service: Gastroenterology;  Laterality: N/A;  Pre: Chronic Diarrhea, Crohn's  Post: AVM  Dr. Neftali Velasco  CO2 Inflation Used    CORONARY ARTERY BYPASS GRAFT  2003    x3    ENDOSCOPY N/A 11/2/2021    Procedure: ESOPHAGOGASTRODUODENOSCOPY WITH ANESTHESIA;  Surgeon: Bridger Bell MD;  Location: [facility] ENDOSCOPY;  Service: Gastroenterology;  Laterality: N/A;  pre anemia;gi bleed  post  gi bleed;schatski ring  Dr. ERIC Velasco    EYE SURGERY Bilateral     catorac    INCISION AND DRAINAGE PERIRECTAL ABSCESS N/A 3/3/2017    Procedure: INCISION AND DRAINAGE OF JEET ANAL ABSCESS;  Surgeon: Lynette Smith MD;  Location: [facility] OR;  Service:     MYRINGOTOMY W/ TUBES Left 04/17/2017    06/10/2016    TONSILLECTOMY      TOTAL HIP ARTHROPLASTY Right 2006       Family History: His family history includes No Known Problems in his daughter, father, and mother.     Social History: He  reports that he quit smoking about 9 years ago. His  smoking use included cigarettes. He started smoking about 35 years ago. He has a 12.50 pack-year smoking history. He has never used smokeless tobacco. He reports that he does not currently use alcohol. He reports that he does not use drugs.    Home Medications:  ALPRAZolam, Copper Gluconate, Melatonin, Nasal Spray, PreserVision/Lutein, albuterol sulfate HFA, amLODIPine, aspirin, atorvastatin, azelastine, carvedilol, cholestyramine, cloNIDine, clopidogrel, diphenhydrAMINE, fexofenadine, fluticasone, furosemide, hydrALAZINE, levothyroxine, magnesium chloride ER, mesalamine, montelukast, omeprazole, potassium chloride, sodium bicarbonate, valsartan, and vitamin D    Allergies:  He is allergic to ondansetron, zofran [ondansetron hcl], lortab [hydrocodone-acetaminophen], and allopurinol.       Vital Signs:   Temp:  [97.3 °F (36.3 °C)] 97.3 °F (36.3 °C)  Heart Rate:  [54] 54  BP: (172)/(59) 172/59  ENT Physical Exam       Result Review    RESULTS REVIEW    I have reviewed the patients old records in the chart.     Assessment & Plan    Diagnoses and all orders for this visit:    1. Nasal septal deviation (Primary)  Overview:  CT Sinus Without Contrast (04/28/2023 10:16)-nasal septal deviation off to the right-hand side with compensatory hypertrophy of the left inferior turbinate there is bilateral right greater than left dana bullosa cells.    Orders:  -     Case Request; Standing  -     oxymetazoline (AFRIN) nasal spray 2 spray  -     oxymetazoline (AFRIN) nasal spray 2 spray  -     dexamethasone (DECADRON) 8 mg in sodium chloride 0.9 % IVPB  -     Case Request    2. Chronic rhinitis    3. Hypertrophy of inferior nasal turbinate  -     Case Request; Standing  -     oxymetazoline (AFRIN) nasal spray 2 spray  -     oxymetazoline (AFRIN) nasal spray 2 spray  -     dexamethasone (DECADRON) 8 mg in sodium chloride 0.9 % IVPB  -     Case Request    4. Perforation of left tympanic membrane    Other orders  -     Follow  Anesthesia Guidelines / Protocol; Future  -     Provide Patient With Instructions on NPO Status  -     Follow Anesthesia Guidelines / Protocol; Standing  -     Verify NPO Status; Standing  -     Obtain Informed Consent; Standing  -     SCD (Sequential Compression Device) - To Be Placed on Patient in Pre-Op; Standing  -     Patient to Void Prior to Transfer to OR; Standing       Medical and surgical options were discussed including medical and surgical options. Risks, benefits and alternatives were discussed and questions were answered. After considering the options, the patient decided to proceed with surgery.     -----SURGERY SCHEDULING:-----  Schedule septoplasty with turbinate reduction (Bilateral)    ---INFORMED CONSENT DISCUSSION:---  SEPTOPLASTY AND TURBINOPLASTY: A septoplasty and inferior turbinoplasty were recommended. The risks and benefits were explained including but not limited to pain, bleeding, infection, risks of the general anesthesia, continued septal deviation, crusting, congestion and septal perforation. Possibilities of continued preoperative symptoms and the possible need for revision surgery and or medical therapy were discussed. Alternatives were discussed. No guarantees were made or implied. Questions were asked appropriately answered.      ---PREOPERATIVE WORKUP:---  labs/ workup per anesthesia  Preoperative blood thinners: If approved by prescribing physician, hold Plavix: 7 days before surgery and 3 days after surgery. ASA 2 weeks before surgery and 2 days after surgery.    PLANNED SURGERY:  nasal septoplasty  bilateral inferior turbinoplasty           Return for follow up postoperatively.      Nathan Chen MD  06/07/23  11:32 CDT

## 2023-06-12 ENCOUNTER — OFFICE VISIT (OUTPATIENT)
Dept: INTERNAL MEDICINE | Facility: CLINIC | Age: 73
End: 2023-06-12
Payer: MEDICARE

## 2023-06-12 VITALS
HEIGHT: 72 IN | DIASTOLIC BLOOD PRESSURE: 56 MMHG | BODY MASS INDEX: 22.08 KG/M2 | TEMPERATURE: 97.1 F | SYSTOLIC BLOOD PRESSURE: 128 MMHG | HEART RATE: 58 BPM | OXYGEN SATURATION: 100 % | WEIGHT: 163 LBS

## 2023-06-12 DIAGNOSIS — K40.91 UNILATERAL RECURRENT INGUINAL HERNIA WITHOUT OBSTRUCTION OR GANGRENE: Primary | ICD-10-CM

## 2023-06-12 DIAGNOSIS — J34.2 NASAL SEPTAL DEVIATION: ICD-10-CM

## 2023-06-12 DIAGNOSIS — J31.0 CHRONIC RHINITIS: ICD-10-CM

## 2023-06-12 DIAGNOSIS — I10 RESISTANT HYPERTENSION: ICD-10-CM

## 2023-06-12 DIAGNOSIS — J34.3 HYPERTROPHY OF INFERIOR NASAL TURBINATE: ICD-10-CM

## 2023-06-12 PROBLEM — R09.89 BRUIT OF RIGHT CAROTID ARTERY: Status: RESOLVED | Noted: 2021-03-17 | Resolved: 2023-06-12

## 2023-06-12 PROCEDURE — 99214 OFFICE O/P EST MOD 30 MIN: CPT | Performed by: INTERNAL MEDICINE

## 2023-06-12 PROCEDURE — 3074F SYST BP LT 130 MM HG: CPT | Performed by: INTERNAL MEDICINE

## 2023-06-12 PROCEDURE — 3078F DIAST BP <80 MM HG: CPT | Performed by: INTERNAL MEDICINE

## 2023-06-12 NOTE — PROGRESS NOTES
"      Chief Complaint  Hernia (Getting worse; stared bothering him more about 10 days ago but got much worse over the weekend )    Subjective        Ricardo [facility] presents to [facility]    HPI  Patient presents today for follow-up.  The patient has had issues with a right inguinal hernia for years.  The patient indicates that over the last 10 days he has had increasing discomfort.  Patient indicates that the pain has not been severe, and has been more discomfort.  Patient has not had any fever or chills.  Hernia is easily reducible.  Patient is interested in evaluation for surgical intervention.    Patient also brought in a blood pressure log.  Blood pressures have been much better controlled.  Is a little bit high when he first came to the office, but it appears to have settled down.  No chest pain or shortness of breath.  Did discuss with him again that we can consider changing the amlodipine to nifedipine.        Review of Systems    Objective   Vital Signs:  /56 Comment: 9:18 AM at home  Pulse 58   Temp 97.1 °F (36.2 °C) (Temporal)   Ht 182.9 cm (72\")   Wt 73.9 kg (163 lb)   SpO2 100%   BMI 22.11 kg/m²   Estimated body mass index is 22.11 kg/m² as calculated from the following:    Height as of this encounter: 182.9 cm (72\").    Weight as of this encounter: 73.9 kg (163 lb).      Physical Exam  Vitals reviewed.   Constitutional:       Appearance: He is not ill-appearing.   Cardiovascular:      Rate and Rhythm: Normal rate and regular rhythm.   Pulmonary:      Effort: Pulmonary effort is normal. No respiratory distress.   Abdominal:      Hernia: A hernia is present. Hernia is present in the right inguinal area.       Neurological:      Mental Status: He is alert.                 Assessment and Plan   Diagnoses and all orders for this visit:    1. Unilateral recurrent inguinal hernia without obstruction or gangrene (Primary)    2. Resistant hypertension    3. Nasal " septal deviation    4. Hypertrophy of inferior nasal turbinate    5. Chronic rhinitis      Plan:    Ambulatory referral to general surgery.      Patient given strict return precautions as well as when to seek help with the emergency department.  Hernia easily reduces on exam.  Initially I thought that this was a femoral hernia, however upon palpation, feels as though it is above the inguinal ligament and medial.  Discussed symptom management.  Patient follows with nephrology.  Patient's blood pressure looks a lot better than previous.  Reviewed patient's blood pressure log.  Discussed with him that if we need more blood pressure, we can consider changing nifedipine.    Patient follows with ENT.  Patient going to have septal surgery with Dr. Chen in the near future.  This is related to his deviation and hypertrophy.      Result Review :           BMI is within normal parameters. No other follow-up for BMI required.           Follow Up   Return if symptoms worsen or fail to improve, for Next scheduled follow up.  Patient was given instructions and counseling regarding his condition or for health maintenance advice. Please see specific information pulled into the AVS if appropriate.       MAHENDRA Zimmer MD, FACP, FHM      Electronically signed by Nathan Zimmer MD, 06/12/23, 11:10 AM CDT.

## 2023-06-13 ENCOUNTER — TELEPHONE (OUTPATIENT)
Dept: OTOLARYNGOLOGY | Facility: CLINIC | Age: 73
End: 2023-06-13
Payer: MEDICARE

## 2023-06-13 NOTE — TELEPHONE ENCOUNTER
Called Dr. Pineda's office to confirm that they the received blood thinner clearance. Nurse stated they did receive it and she would make sure the doctor got it.

## 2023-06-19 ENCOUNTER — PATIENT ROUNDING (BHMG ONLY) (OUTPATIENT)
Dept: SURGERY | Facility: CLINIC | Age: 73
End: 2023-06-19
Payer: MEDICARE

## 2023-06-19 ENCOUNTER — TELEPHONE (OUTPATIENT)
Dept: OTOLARYNGOLOGY | Facility: CLINIC | Age: 73
End: 2023-06-19
Payer: MEDICARE

## 2023-06-19 PROBLEM — K40.20 BILATERAL INGUINAL HERNIA WITHOUT OBSTRUCTION OR GANGRENE: Status: ACTIVE | Noted: 2023-06-19

## 2023-06-19 NOTE — PROGRESS NOTES
June 19, 2023    Hello, may I speak with Ricardo Hugo?    My name is Wendy      I am  with [facility]  [facility]  [address]  [place] [address] [phone].    Before we get started may I verify your date of birth? 1950    I am calling to officially welcome you to our practice and ask about your recent visit. Is this a good time to talk? yes    Tell me about your visit with us. What things went well?  I like Dr. Rivera, you can tell she loves her profession.       We're always looking for ways to make our patients' experiences even better. Do you have recommendations on ways we may improve?  no    Overall were you satisfied with your first visit to our practice? yes       I appreciate you taking the time to speak with me today. Is there anything else I can do for you? no      Thank you, and have a great day.

## 2023-06-19 NOTE — TELEPHONE ENCOUNTER
Called Dr. Oglesby office. Spoke with breanna. She stated she remembered me calling Friday 6/16 to check on blood thinner clearance and that they were working on it now.

## 2023-06-26 ENCOUNTER — TELEPHONE (OUTPATIENT)
Dept: ONCOLOGY | Facility: CLINIC | Age: 73
End: 2023-06-26

## 2023-06-26 NOTE — TELEPHONE ENCOUNTER
"  Caller: Ricardo Hugo \"Harjinder\"    Relationship: Self    Best call back number: [phone]    What is the best time to reach you: ANYTIME    Who are you requesting to speak with (clinical staff, provider, specific staff member): SCHEDULING    What was the call regarding: PLEASE CANCEL PATIENT'S 6/30 APPTS - HE IS HAVING SURGERY AND WILL CALLBACK TO R/S AFTER HE HAS RECOVERED.     Is it okay if the provider responds through MyChart: NO    "

## 2023-07-14 ENCOUNTER — TELEPHONE (OUTPATIENT)
Dept: ONCOLOGY | Facility: CLINIC | Age: 73
End: 2023-07-14

## 2023-07-14 NOTE — TELEPHONE ENCOUNTER
"    Caller: Ricardo Hugo \"Harjinder\"    Relationship to patient: Self    Best call back number: [phone]     Chief complaint: R/S    Type of visit: LAB & FOLLOW UP    Requested date: CALL PT TO RESCHEDULE     If rescheduling, when is the original appointment: 6/30               "

## 2023-07-24 RX ORDER — AMLODIPINE BESYLATE 10 MG/1
TABLET ORAL
Qty: 90 TABLET | Refills: 3 | OUTPATIENT
Start: 2023-07-24

## 2023-07-24 NOTE — TELEPHONE ENCOUNTER
Can we please see if he is actually still taking this medication as he is on other calcium channel blocker by the name of Nifedipine or procardia, is what is says in chart at least.

## 2023-07-25 ENCOUNTER — PATIENT OUTREACH (OUTPATIENT)
Dept: CASE MANAGEMENT | Facility: OTHER | Age: 73
End: 2023-07-25
Payer: MEDICARE

## 2023-07-25 NOTE — OUTREACH NOTE
"AMBULATORY CASE MANAGEMENT NOTE    Name and Relationship of Patient/Support Person: Ricardo Hugo \"Harjinder\" - Self    Patient Outreach    RN-ACM outreach to patient to offer enrollment in [employer] [facility] for assistance with education and management of hypertension.     [facility]ment    Was the enrollment attempt to reach the patient successful?: yes  Date/Time of successful contact: 7/25/2023  2:24 PM  Patient response: not interested  Enrolling provider: Nathan Zimmer MD Glenna H  Ambulatory Case Management    7/25/2023, 14:25 CDT  "

## 2023-07-27 DIAGNOSIS — F41.9 ANXIETY: ICD-10-CM

## 2023-07-28 RX ORDER — ALPRAZOLAM 0.25 MG/1
0.25 TABLET ORAL NIGHTLY PRN
Qty: 30 TABLET | Refills: 2 | Status: SHIPPED | OUTPATIENT
Start: 2023-07-28

## 2023-08-01 ENCOUNTER — OFFICE VISIT (OUTPATIENT)
Dept: INTERNAL MEDICINE | Facility: CLINIC | Age: 73
End: 2023-08-01
Payer: MEDICARE

## 2023-08-01 VITALS
DIASTOLIC BLOOD PRESSURE: 60 MMHG | OXYGEN SATURATION: 99 % | HEIGHT: 72 IN | BODY MASS INDEX: 21.4 KG/M2 | HEART RATE: 55 BPM | SYSTOLIC BLOOD PRESSURE: 150 MMHG | WEIGHT: 158 LBS | TEMPERATURE: 96.8 F

## 2023-08-01 DIAGNOSIS — D63.1 ANEMIA DUE TO STAGE 3A CHRONIC KIDNEY DISEASE: ICD-10-CM

## 2023-08-01 DIAGNOSIS — N18.4 ANEMIA DUE TO STAGE 4 CHRONIC KIDNEY DISEASE: ICD-10-CM

## 2023-08-01 DIAGNOSIS — N18.4 CRD (CHRONIC RENAL DISEASE), STAGE IV: Primary | ICD-10-CM

## 2023-08-01 DIAGNOSIS — D63.1 ANEMIA DUE TO STAGE 4 CHRONIC KIDNEY DISEASE: ICD-10-CM

## 2023-08-01 DIAGNOSIS — I10 RESISTANT HYPERTENSION: ICD-10-CM

## 2023-08-01 DIAGNOSIS — G47.9 SLEEP DIFFICULTIES: ICD-10-CM

## 2023-08-01 DIAGNOSIS — N18.31 ANEMIA DUE TO STAGE 3A CHRONIC KIDNEY DISEASE: ICD-10-CM

## 2023-08-01 PROCEDURE — 3077F SYST BP >= 140 MM HG: CPT | Performed by: INTERNAL MEDICINE

## 2023-08-01 PROCEDURE — 99214 OFFICE O/P EST MOD 30 MIN: CPT | Performed by: INTERNAL MEDICINE

## 2023-08-01 PROCEDURE — 3078F DIAST BP <80 MM HG: CPT | Performed by: INTERNAL MEDICINE

## 2023-08-01 RX ORDER — DAPAGLIFLOZIN 10 MG/1
1 TABLET, FILM COATED ORAL DAILY
COMMUNITY

## 2023-08-01 RX ORDER — NIFEDIPINE 60 MG/1
60 TABLET, FILM COATED, EXTENDED RELEASE ORAL DAILY
Qty: 30 TABLET | Refills: 2 | Status: SHIPPED | OUTPATIENT
Start: 2023-08-01 | End: 2023-08-03 | Stop reason: SDUPTHER

## 2023-08-01 RX ORDER — HYDRALAZINE HYDROCHLORIDE 100 MG/1
100 TABLET, FILM COATED ORAL 3 TIMES DAILY
Start: 2023-08-01

## 2023-08-02 DIAGNOSIS — D64.9 SYMPTOMATIC ANEMIA: Primary | ICD-10-CM

## 2023-08-02 LAB
ALBUMIN/CREAT UR: 1438 MG/G CREAT (ref 0–29)
BUN SERPL-MCNC: 46 MG/DL (ref 8–23)
BUN/CREAT SERPL: 16.1 (ref 7–25)
CALCIUM SERPL-MCNC: 8.9 MG/DL (ref 8.6–10.5)
CHLORIDE SERPL-SCNC: 104 MMOL/L (ref 98–107)
CO2 SERPL-SCNC: 21.7 MMOL/L (ref 22–29)
CREAT SERPL-MCNC: 2.85 MG/DL (ref 0.76–1.27)
CREAT UR-MCNC: 33.6 MG/DL
EGFRCR SERPLBLD CKD-EPI 2021: 22.3 ML/MIN/1.73
GLUCOSE SERPL-MCNC: 111 MG/DL (ref 65–99)
MICROALBUMIN UR-MCNC: 483.3 UG/ML
POTASSIUM SERPL-SCNC: 4.4 MMOL/L (ref 3.5–5.2)
SODIUM SERPL-SCNC: 139 MMOL/L (ref 136–145)

## 2023-08-02 RX ORDER — FAMOTIDINE 10 MG/ML
20 INJECTION, SOLUTION INTRAVENOUS ONCE
OUTPATIENT
Start: 2023-08-02 | End: 2023-08-02

## 2023-08-02 RX ORDER — FUROSEMIDE 10 MG/ML
10 INJECTION INTRAMUSCULAR; INTRAVENOUS ONCE
OUTPATIENT
Start: 2023-08-02 | End: 2023-08-02

## 2023-08-02 RX ORDER — SODIUM CHLORIDE 9 MG/ML
250 INJECTION, SOLUTION INTRAVENOUS AS NEEDED
OUTPATIENT
Start: 2023-08-02

## 2023-08-02 RX ORDER — DIPHENHYDRAMINE HYDROCHLORIDE 50 MG/ML
12.5 INJECTION INTRAMUSCULAR; INTRAVENOUS ONCE
OUTPATIENT
Start: 2023-08-02

## 2023-08-02 NOTE — PROGRESS NOTES
History   Burt Soto is a 65 y.o. male who presented to the clinic this date with complaints of left aural fullness. He has previous history of left PE tube which is in the process of extruding. Summary   Tympanometry consistent with negative middle ear pressure right and TM perforation left. Pure tone testing indicates mild to moderate SNHL right and mild to profound mixed hearing loss left. When compared to pre tube placement audiogram obtained in July, right ear thresholds remained stable. Left ear air thresholds showed a drop in the low frequencies, bone thresholds remained stable. Results   Otoscopy:    Right: Clear EAC/Normal TM   Left: TM perforation, PE tube in TM    Audiometry:    Right: Mild sloping SNHL   Left: Mild to profound sloping mixed hearing loss         Tympanometry:     Right: Type C   Left: Could not seal    Plan   Results of today's testing were discussed with Mr. Marco Mancuso and the following recommendations were made:    1. Follow up with ENT as scheduled. 2. Recheck hearing following medical management.         Audiogram and Acoustic Immittance Glabellar Complex Units: 12

## 2023-08-03 ENCOUNTER — INFUSION (OUTPATIENT)
Dept: ONCOLOGY | Facility: HOSPITAL | Age: 73
End: 2023-08-03
Payer: MEDICARE

## 2023-08-03 ENCOUNTER — LAB (OUTPATIENT)
Dept: LAB | Facility: HOSPITAL | Age: 73
End: 2023-08-03
Payer: MEDICARE

## 2023-08-03 VITALS
TEMPERATURE: 97.1 F | RESPIRATION RATE: 17 BRPM | DIASTOLIC BLOOD PRESSURE: 48 MMHG | HEIGHT: 72 IN | OXYGEN SATURATION: 97 % | BODY MASS INDEX: 21.35 KG/M2 | WEIGHT: 157.6 LBS | SYSTOLIC BLOOD PRESSURE: 116 MMHG | HEART RATE: 51 BPM

## 2023-08-03 DIAGNOSIS — N18.4 ANEMIA DUE TO STAGE 4 CHRONIC KIDNEY DISEASE: ICD-10-CM

## 2023-08-03 DIAGNOSIS — D63.1 ANEMIA DUE TO STAGE 4 CHRONIC KIDNEY DISEASE: Primary | ICD-10-CM

## 2023-08-03 DIAGNOSIS — D64.9 SYMPTOMATIC ANEMIA: ICD-10-CM

## 2023-08-03 DIAGNOSIS — D63.1 ANEMIA DUE TO STAGE 4 CHRONIC KIDNEY DISEASE: ICD-10-CM

## 2023-08-03 DIAGNOSIS — I25.9 IHD (ISCHEMIC HEART DISEASE): ICD-10-CM

## 2023-08-03 DIAGNOSIS — I51.89 DIASTOLIC DYSFUNCTION: ICD-10-CM

## 2023-08-03 DIAGNOSIS — N18.4 ANEMIA DUE TO STAGE 4 CHRONIC KIDNEY DISEASE: Primary | ICD-10-CM

## 2023-08-03 DIAGNOSIS — C91.10 CLL (CHRONIC LYMPHOCYTIC LEUKEMIA): ICD-10-CM

## 2023-08-03 DIAGNOSIS — N18.4 CRD (CHRONIC RENAL DISEASE), STAGE IV: ICD-10-CM

## 2023-08-03 DIAGNOSIS — I10 RESISTANT HYPERTENSION: ICD-10-CM

## 2023-08-03 LAB
ABO GROUP BLD: NORMAL
ALBUMIN SERPL-MCNC: 3.8 G/DL (ref 3.5–5.2)
ALBUMIN/GLOB SERPL: 1.6 G/DL
ALP SERPL-CCNC: 137 U/L (ref 39–117)
ALT SERPL W P-5'-P-CCNC: 8 U/L (ref 1–41)
ANION GAP SERPL CALCULATED.3IONS-SCNC: 12 MMOL/L (ref 5–15)
AST SERPL-CCNC: 13 U/L (ref 1–40)
BASOPHILS # BLD AUTO: 0.04 10*3/MM3 (ref 0–0.2)
BASOPHILS NFR BLD AUTO: 0.9 % (ref 0–1.5)
BILIRUB SERPL-MCNC: 0.4 MG/DL (ref 0–1.2)
BLD GP AB SCN SERPL QL: NEGATIVE
BUN SERPL-MCNC: 47 MG/DL (ref 8–23)
BUN/CREAT SERPL: 14.7 (ref 7–25)
CALCIUM SPEC-SCNC: 9 MG/DL (ref 8.6–10.5)
CHLORIDE SERPL-SCNC: 108 MMOL/L (ref 98–107)
CO2 SERPL-SCNC: 21 MMOL/L (ref 22–29)
CREAT SERPL-MCNC: 3.19 MG/DL (ref 0.76–1.27)
DEPRECATED RDW RBC AUTO: 63.4 FL (ref 37–54)
EGFRCR SERPLBLD CKD-EPI 2021: 19.5 ML/MIN/1.73
EOSINOPHIL # BLD AUTO: 0.16 10*3/MM3 (ref 0–0.4)
EOSINOPHIL NFR BLD AUTO: 3.7 % (ref 0.3–6.2)
ERYTHROCYTE [DISTWIDTH] IN BLOOD BY AUTOMATED COUNT: 16 % (ref 12.3–15.4)
GLOBULIN UR ELPH-MCNC: 2.4 GM/DL
GLUCOSE SERPL-MCNC: 134 MG/DL (ref 65–99)
HCT VFR BLD AUTO: 25.5 % (ref 37.5–51)
HGB BLD-MCNC: 7.7 G/DL (ref 13–17.7)
IRON 24H UR-MRATE: 67 MCG/DL (ref 59–158)
IRON SATN MFR SERPL: 27 % (ref 20–50)
LYMPHOCYTES # BLD AUTO: 0.84 10*3/MM3 (ref 0.7–3.1)
LYMPHOCYTES NFR BLD AUTO: 19.4 % (ref 19.6–45.3)
MCH RBC QN AUTO: 32.4 PG (ref 26.6–33)
MCHC RBC AUTO-ENTMCNC: 30.2 G/DL (ref 31.5–35.7)
MCV RBC AUTO: 107.1 FL (ref 79–97)
MONOCYTES # BLD AUTO: 0.56 10*3/MM3 (ref 0.1–0.9)
MONOCYTES NFR BLD AUTO: 12.9 % (ref 5–12)
NEUTROPHILS NFR BLD AUTO: 2.72 10*3/MM3 (ref 1.7–7)
NEUTROPHILS NFR BLD AUTO: 62.6 % (ref 42.7–76)
PLATELET # BLD AUTO: 101 10*3/MM3 (ref 140–450)
PMV BLD AUTO: 9.9 FL (ref 6–12)
POTASSIUM SERPL-SCNC: 4.5 MMOL/L (ref 3.5–5.2)
PROT SERPL-MCNC: 6.2 G/DL (ref 6–8.5)
RBC # BLD AUTO: 2.38 10*6/MM3 (ref 4.14–5.8)
RH BLD: NEGATIVE
SODIUM SERPL-SCNC: 141 MMOL/L (ref 136–145)
T&S EXPIRATION DATE: NORMAL
TIBC SERPL-MCNC: 244 MCG/DL (ref 298–536)
TRANSFERRIN SERPL-MCNC: 164 MG/DL (ref 200–360)
WBC NRBC COR # BLD: 4.34 10*3/MM3 (ref 3.4–10.8)

## 2023-08-03 PROCEDURE — 86900 BLOOD TYPING SEROLOGIC ABO: CPT

## 2023-08-03 PROCEDURE — 84466 ASSAY OF TRANSFERRIN: CPT

## 2023-08-03 PROCEDURE — 85025 COMPLETE CBC W/AUTO DIFF WBC: CPT

## 2023-08-03 PROCEDURE — 86850 RBC ANTIBODY SCREEN: CPT

## 2023-08-03 PROCEDURE — 86901 BLOOD TYPING SEROLOGIC RH(D): CPT

## 2023-08-03 PROCEDURE — 25010000002 EPOETIN ALFA-EPBX 40000 UNIT/ML SOLUTION: Performed by: NURSE PRACTITIONER

## 2023-08-03 PROCEDURE — 96372 THER/PROPH/DIAG INJ SC/IM: CPT

## 2023-08-03 PROCEDURE — 80053 COMPREHEN METABOLIC PANEL: CPT

## 2023-08-03 PROCEDURE — 83540 ASSAY OF IRON: CPT

## 2023-08-03 PROCEDURE — 36415 COLL VENOUS BLD VENIPUNCTURE: CPT

## 2023-08-03 RX ORDER — NIFEDIPINE 60 MG/1
60 TABLET, FILM COATED, EXTENDED RELEASE ORAL DAILY
Qty: 90 TABLET | Refills: 3 | Status: SHIPPED | OUTPATIENT
Start: 2023-08-03

## 2023-08-03 RX ORDER — MONTELUKAST SODIUM 10 MG/1
10 TABLET ORAL NIGHTLY
Qty: 90 TABLET | Refills: 3 | Status: SHIPPED | OUTPATIENT
Start: 2023-08-03

## 2023-08-03 RX ORDER — CLONIDINE HYDROCHLORIDE 0.3 MG/1
0.3 TABLET ORAL 3 TIMES DAILY
Qty: 270 TABLET | Refills: 3 | Status: SHIPPED | OUTPATIENT
Start: 2023-08-03

## 2023-08-03 RX ORDER — CARVEDILOL 6.25 MG/1
6.25 TABLET ORAL 2 TIMES DAILY WITH MEALS
Qty: 180 TABLET | Refills: 3 | Status: SHIPPED | OUTPATIENT
Start: 2023-08-03

## 2023-08-03 RX ADMIN — EPOETIN ALFA-EPBX 40000 UNITS: 40000 INJECTION, SOLUTION INTRAVENOUS; SUBCUTANEOUS at 10:01

## 2023-08-11 ENCOUNTER — CLINICAL SUPPORT (OUTPATIENT)
Dept: INTERNAL MEDICINE | Facility: CLINIC | Age: 73
End: 2023-08-11
Payer: MEDICARE

## 2023-08-11 DIAGNOSIS — N18.4 ANEMIA DUE TO STAGE 4 CHRONIC KIDNEY DISEASE: Primary | ICD-10-CM

## 2023-08-11 DIAGNOSIS — D51.0 PERNICIOUS ANEMIA: Primary | ICD-10-CM

## 2023-08-11 DIAGNOSIS — D63.1 ANEMIA DUE TO STAGE 4 CHRONIC KIDNEY DISEASE: Primary | ICD-10-CM

## 2023-08-11 RX ORDER — CYANOCOBALAMIN 1000 UG/ML
1000 INJECTION, SOLUTION INTRAMUSCULAR; SUBCUTANEOUS
Status: SHIPPED | OUTPATIENT
Start: 2023-08-11

## 2023-08-11 RX ADMIN — CYANOCOBALAMIN 1000 MCG: 1000 INJECTION, SOLUTION INTRAMUSCULAR; SUBCUTANEOUS at 11:23

## 2023-08-14 ENCOUNTER — OFFICE VISIT (OUTPATIENT)
Dept: GASTROENTEROLOGY | Facility: CLINIC | Age: 73
End: 2023-08-14
Payer: MEDICARE

## 2023-08-14 VITALS
OXYGEN SATURATION: 97 % | WEIGHT: 153 LBS | SYSTOLIC BLOOD PRESSURE: 154 MMHG | HEIGHT: 72 IN | BODY MASS INDEX: 20.72 KG/M2 | TEMPERATURE: 98.4 F | DIASTOLIC BLOOD PRESSURE: 54 MMHG | HEART RATE: 59 BPM

## 2023-08-14 DIAGNOSIS — K50.10 CROHN'S DISEASE OF LARGE INTESTINE WITHOUT COMPLICATION: Primary | ICD-10-CM

## 2023-08-14 DIAGNOSIS — D68.9 COAGULOPATHY: ICD-10-CM

## 2023-08-14 PROCEDURE — 3077F SYST BP >= 140 MM HG: CPT | Performed by: NURSE PRACTITIONER

## 2023-08-14 PROCEDURE — 3078F DIAST BP <80 MM HG: CPT | Performed by: NURSE PRACTITIONER

## 2023-08-14 PROCEDURE — 1160F RVW MEDS BY RX/DR IN RCRD: CPT | Performed by: NURSE PRACTITIONER

## 2023-08-14 PROCEDURE — 1159F MED LIST DOCD IN RCRD: CPT | Performed by: NURSE PRACTITIONER

## 2023-08-14 PROCEDURE — 99214 OFFICE O/P EST MOD 30 MIN: CPT | Performed by: NURSE PRACTITIONER

## 2023-08-14 RX ORDER — DAPAGLIFLOZIN 10 MG/1
1 TABLET, FILM COATED ORAL DAILY
Qty: 30 TABLET | Refills: 0 | Status: SHIPPED | OUTPATIENT
Start: 2023-08-14

## 2023-08-14 RX ORDER — OXYMETAZOLINE HCL 0.05 %
SPRAY, NON-AEROSOL (ML) NASAL NIGHTLY
COMMUNITY

## 2023-08-14 RX ORDER — DAPAGLIFLOZIN 10 MG/1
10 TABLET, FILM COATED ORAL DAILY
Qty: 90 TABLET | Refills: 3 | Status: SHIPPED | OUTPATIENT
Start: 2023-08-14

## 2023-08-14 NOTE — TELEPHONE ENCOUNTER
I didn't see this on his medication list, but discussion was documented last OV.  Please review pended Rx's for accuracy.I

## 2023-08-14 NOTE — PROGRESS NOTES
"[facility] GASTROENTEROLOGY - OFFICE NOTE    8/14/2023    Ricardo Hugo   1950    Primary Physician: Nathan Zimmer MD    Chief Complaint   Patient presents with    Crohn's Disease         HISTORY OF PRESENT ILLNESS:     Ricardo Hugo is a 73 y.o. male presents with Crohn's.  This is stable.  Has 1 bowel movement daily.  Denies abdominal pain, rectal bleeding, or weight loss.  No fevers.            He is on plavix. Dr. Pineda.     COLONOSCOPY (10/13/2020 08:50)     ======================================================================  Office visit  9-13-22 HPI  Ricardo Hugo is a 72 y.o. male who presents with a chief complaint of Crohn's.     He comes in for an annual checkup.  He tells me he is doing quite well.  He states he is \"happy and healthy\".  Denies any GI symptoms.  He has 1 formed bowel movement proximately every other day.  He continues on cholestyramine.  He also continues on Apriso.  Denies any blood or mucus in his stools.  Denies melena.  He does have chronic anemia.  Is multifactorial.  He has known AVMs in his colon and upper gastrointestinal tract.  He last underwent a colonoscopy 2 years ago and upper endoscopy last October.  He does have chronic stage IV renal disease.  That is been stable.  He is getting hemoglobins checked routinely.  I see where he had it done on the eighth and his hemoglobin was 9.6 slightly lower than his baseline.  He has not received a transfusion in a year he tells me.        ============= copy of November 17, 2021 HPI===================================  =HISTORY OF PRESENT ILLNESS:      Ricardo Hugo is a 71 y.o. male presents for f/u melena. He was hospitalized 10/2021 and was seen in consult by Dr. Bell for melena and anemia. EGD was performed , see details below.  He has had no further melena. No sign of active GI bleeding. He takes omeprazole daily for several years.  He denies any unintentional weight loss or abdominal pain.  " Appetite is good.        He has been on plavix and has restarted. He has history cad and vascular disease.  He has had carotid surgery by Dr. Pineda 6/2021. He sees Dr. Pineda this week for vascular test.        He has chronic anemia and is followed by Dr. Goldberg. Last labs 11-11-21 hgb 8.9.  He sees Dr. Goldberg once weekly. He gets iron infusions.            EGD 11-2-21 by Dr. Bell  - Benign-appearing esophageal ring.  - Z-line irregular.  - Small hiatal hernia.  - Erythematous mucosa in the stomach.  - Non-bleeding erosive gastropathy.  - Two angioectasias in the duodenum. Treated with bipolar cautery.  Recommendations   - Return patient to [facility] for [facility] care.  - Clear liquid diet x 1, then advance as desired per primary inpatient care team..  - Avoid aspirin, ibuprofen, naproxen, or other non-steroidal anti-inflammatory drugs, anticoagulant/antiplatelet agents, ethanol as able clinically. 48 hours at minimum, up to 14 days if able clinically. Consider risk of thromboembolic phenomena as greater than re-bleeding risk, and  weigh risks vs benefits.  - Use a proton pump inhibitor PO BID.  - Observe patient's clinical course.  - Return to GI clinic in 2 weeks.  Please reconsult inpatient GI service prn.           Last colonoscopy 10-13-20   - The examined portion of the ileum was normal.  - A single non-bleeding colonic angioectasia.  - hemosiderin staining, minimum and improved from July's exam  - The examination was otherwise normal on direct and retroflexion views.  - Diverticulosis in the sigmoid colon.  - Biopsies were taken with a cold forceps from the ascending colon, transverse colon, 40cm, 20cm and  rectum for evaluation of microscopic colitis.  - - No visual evidence of active Crohn's           Crohn's disease  This seems to be stable. He has one bm daily. Takes apriso 4 tabs daily.      =========== copy of September 2021 HPI============================  =   HPI     Ricardo Hugo is  a 71 y.o. male who presents with a chief complaint of Crohn's.     He has a history of chronic diarrhea but that is resolved.  He states he has a bowel movement daily or every other day.  He continues on cholestyramine 1 packet daily.  He also has a history of Crohn's disease.  This was first noted with mild inflammation in the rectosigmoid area.  [facility] lab suggested underlying Crohn's.  Follow-up colonoscopy last November showed no evidence of Crohn's.  It was incomplete remission.  He is done quite well since that time.  His diarrhea is resolved.  He denies any abdominal discomfort.  Denies any blood in stools.  Denies melena or bright red blood per rectum.  Has had no change in bowel habits.  Weights been stable.     Of note, he did tell me he had to get a transfusion this past early summer.  He was told he may be losing blood in his stools.  He also scheduled to begin Procrit shots.  He does have a history of colonic AVMs and is on Plavix.  Denies any dyspepsia, nausea or abdominal discomfort.  He does take Aleve occasionally.  He takes Aleve PM to help him sleep.  Denies heartburn or dysphagia.        ============= copy of [facility] March 2021==============================  HPI     Ricardo Hugo is a 71 y.o. male who presents with a chief complaint of Crohn's.        When he was here in November he was asymptomatic on Apriso.  We have talked about options.  He chose to stay with Apriso.  He also takes 1 packet of cholestyramine daily.     He tells me he is still doing well.  Denies diarrhea.  States he averages 1 bowel movement every other day.  Is formed.  Denies any bloody mucus.  No abdominal cramping.  Continues with Apriso and 1 packet of cholestyramine daily.  He did have labs done March 11.  I reviewed those with him.  His creatinine is 2.22 which is down a little bit.  Hemoglobin slightly higher at 9.7 than what it was in September at 8.5.  He does continue to follow with nephrology tells me.  He  recently had an ultrasound Doppler of his neck and was found to have 70% stenosis in one of his carotids.  He is scheduled to see Dr. Pineda from vascular in a week.           ================================================ November 2020 HPI= 9=================  HPI     Ricardo Hugo is a 70 y.o. male who presents with a chief complaint of Crohn's     He did undergo colonoscopy in October.  See his summary of the pathology and copy of the impression from the colonoscopy below.  Overall it appeared improved.  There is no gross visual evidence of obvious Crohn's disease.  Random biopsies throughout the colon did show evidence of microscopic mild inflammation throughout with mild to moderate in the sigmoid colon.      Currently he feels well.  States he is having 1 formed brown bowel movement a day.  He takes Questran 1 packet daily.  He continues on Apriso 4 tablets daily.  Denies bloody mucus.  Denies cramps.  He has good appetite.  He is eating what he wants to.  He is active.  He has no complaints.  He does tell me he continues to follow-up with nephrology.  He has been told his renal function has been stable.  He is drinking his Gatorade once daily.  He has a follow-up appointment with renal after the holidays.     Colonoscopy October 13, 2020 impression  - The examined portion of the ileum was normal.  - A single non-bleeding colonic angioectasia.  - hemosiderin staining, minimum and improved from July's exam  - The examination was otherwise normal on direct and retroflexion views.  - Diverticulosis in the sigmoid colon.  - Biopsies were taken with a cold forceps from the ascending colon, transverse colon, 40cm, 20cm and  rectum for evaluation of microscopic colitis.  - - No visual evidence of active Crohn's     Pathology showed mild active inflammation on the random biopsies with mild to moderate in the sigmoid colon.        ======================== September 29, 2020  HPI================================  HPI     Ricardo Hugo is a 70 y.o. male who presents with a chief complaint of Crohn's and diarrhea.     He was in the hospital mid-September.  He was there with UTI and acute renal failure.  They attributed his acute renal failure to diarrhea.  But similar to when he was in the hospital in August he told me he was not having that much diarrhea.  When I saw him there in September he states he was maybe having 2-3 bowel movements at most.  He states they were controlled with Questran but he was only taking it once a day.  Previously in August he was not taking the Apriso except for 1 tablet daily.  When we saw him in September he was taken the Apriso 4 tablets daily.  He was diagnosed with urinary tract infection and felt to be may be prostatitis.  He is seeing nephrology.     He comes in for follow-up visit.  He states he is having 1 bowel movement a day.  He describes this as soft mud.  There is no blood or mucus.  No nocturnal bowel movements.  No abdominal cramping.  He is eating well.  He finished his Cipro for his UTI type symptoms 2 days ago.  He saw his primary care provider earlier today who felt everything was stable.  He is due to see nephrology tomorrow.  He is now drinking 2 bottles of Gatorade a day to help with hydration.     He continues on Apriso 4 tablets daily and is taking Questran 1 packet twice a day.         Past Medical History:   Diagnosis Date    3-vessel CAD 8/11/2020    Allergic rhinitis     Anxiety disorder 4/27/2020    Arthritis     Asymmetrical sensorineural hearing loss 6/28/2017    Atherosclerosis of native artery of both lower extremities with intermittent claudication 7/18/2019    Avascular necrosis of femoral head, left 07/11/2020    right hip after surgery    Carotid stenosis     Chronic mucoid otitis media     Chronic rhinitis     Coronary artery disease     HEART BYPASS 2004    Crohn's disease of large intestine with other complication  7/30/2020    Chronic diarrhea Colonoscopy July 2020 revealed mild patchy scattered hemosiderin staining with inflammation more so in rectosigmoid area.  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    Iron deficiency anemia 7/14/2020    Mixed hearing loss of left ear     PAD (peripheral artery disease) 8/11/2020    Perianal abscess     Pernicious anemia 08/17/2020    took shots but never diagnosed with b12 deficiency    Personal history of alcoholism 08/11/2020    quit drinking in 2013    Prostatic hypertrophy 8/11/2020    Sensorineural hearing loss     Sepsis with acute renal failure 9/15/2020    Shortness of breath 5/27/2021    Tinnitus     Ventricular tachycardia, nonsustained 7/14/2020    Weight loss 7/11/2020       Past Surgical History:   Procedure Laterality Date    ARTERY SURGERY  2021    right side on neck    CAROTID ENDARTERECTOMY Right 5/10/2021    Procedure: RIGHT CAROTID ENDARTERECTOMY WITH EEG;  Surgeon: Gil Pineda DO;  Location: [facility] OR ;  Service: Vascular;  Laterality: Right;    COLONOSCOPY N/A 7/2/2020    Procedure: COLONOSCOPY WITH ANESTHESIA;  Surgeon: Adrien Brewster MD;  Location: [facility] ENDOSCOPY;  Service: Gastroenterology;  Laterality: N/A;  pre op: diarrhea  post op: polyps  PCP: Joe Velasco MD    COLONOSCOPY N/A 10/13/2020    Procedure: COLONOSCOPY WITH ANESTHESIA;  Surgeon: Adrien Brewster MD;  Location: [facility] ENDOSCOPY;  Service: Gastroenterology;  Laterality: N/A;  Pre: Chronic Diarrhea, Crohn's  Post: AVM  Dr. Neftali Velasco  CO2 Inflation Used    CORONARY ARTERY BYPASS GRAFT  2003    x3    ENDOSCOPY N/A 11/2/2021     Procedure: ESOPHAGOGASTRODUODENOSCOPY WITH ANESTHESIA;  Surgeon: Bridger Bell MD;  Location:  [facility] ENDOSCOPY;  Service: Gastroenterology;  Laterality: N/A;  pre anemia;gi bleed  post  gi bleed;fabian Velasco    EYE SURGERY Bilateral     catorac    INCISION AND DRAINAGE PERIRECTAL ABSCESS N/A 3/3/2017    Procedure: INCISION AND DRAINAGE OF JEET ANAL ABSCESS;  Surgeon: Lynette Smith MD;  Location:  [facility] OR;  Service:     INGUINAL HERNIA REPAIR Bilateral 6/27/2023    Procedure: INGUINAL HERNIA BILATERAL REPAIR LAPAROSCOPIC WITH DAVINCI ROBOT WITH MESH;  Surgeon: Tahira Rivera MD;  Location:  [facility] OR;  Service: Robotics - DaVinci;  Laterality: Bilateral;    MYRINGOTOMY W/ TUBES Left 04/17/2017    06/10/2016    TONSILLECTOMY      TOTAL HIP ARTHROPLASTY Right 2006       Outpatient Medications Marked as Taking for the 8/14/23 encounter (Office Visit) with Zuleika Schulte APRN   Medication Sig Dispense Refill    albuterol sulfate  (90 Base) MCG/ACT inhaler USE 2 INHALATIONS FOUR TIMES A DAY AS NEEDED 20.1 g 3    ALPRAZolam (XANAX) 0.25 MG tablet TAKE 1 TABLET BY MOUTH AT NIGHT AS NEEDED FOR ANXIETY 30 tablet 2    aspirin (aspirin) 81 MG EC tablet Take  by mouth Daily.      atorvastatin (LIPITOR) 10 MG tablet Take 1 tablet by mouth Daily. 90 tablet 3    azelastine (ASTELIN) 0.1 % nasal spray USE 1 SPRAY IN EACH NOSTRIL TWICE A DAY AS NEEDED 90 mL 1    carvedilol (Coreg) 6.25 MG tablet Take 1 tablet by mouth 2 (Two) Times a Day With Meals. 180 tablet 3    cholestyramine (QUESTRAN) 4 g packet Take 1 packet by mouth Daily. 90 packet 3    cloNIDine (Catapres) 0.3 MG tablet Take 1 tablet by mouth 3 (Three) Times a Day. 270 tablet 3    clopidogrel (PLAVIX) 75 MG tablet TAKE 1 TABLET DAILY 90 tablet 3    Copper Gluconate (Copper Caps) 2 MG capsule Take  by mouth.      diphenhydrAMINE HCl, Sleep, (ZzzQuil) 25 MG capsule Take  by mouth Every Night.      fexofenadine (ALLEGRA) 180 MG tablet Take  1 tablet by mouth Daily.      fluticasone (FLONASE) 50 MCG/ACT nasal spray 2 sprays into the nostril(s) as directed by provider Daily As Needed for Rhinitis.      furosemide (LASIX) 20 MG tablet TAKE 1 TABLET TWICE A  tablet 3    hydrALAZINE (APRESOLINE) 100 MG tablet Take 1 tablet by mouth 3 (Three) Times a Day. 100mg daily      levothyroxine (Synthroid) 75 MCG tablet Take 1 tablet by mouth Daily. 90 tablet 3    magnesium chloride ER 64 MG DR tablet Take 143 mg by mouth Daily.      Melatonin 10 MG capsule Take 2 capsules by mouth Every Night.      mesalamine (APRISO) 0.375 g 24 hr capsule Take 4 capsules by mouth Daily. 360 capsule 3    montelukast (SINGULAIR) 10 MG tablet Take 1 tablet by mouth Every Night. 90 tablet 3    Multiple Vitamins-Minerals (PRESERVISION/LUTEIN) capsule Take 1 capsule by mouth 2 (two) times a day.      NIFEdipine CC (ADALAT CC) 60 MG 24 hr tablet Take 1 tablet by mouth Daily. 90 tablet 3    omeprazole (priLOSEC) 20 MG capsule TAKE 1 CAPSULE DAILY 90 capsule 1    potassium chloride 10 MEQ CR tablet Take 1 tablet by mouth 2 (Two) Times a Day. 180 tablet 3    sodium bicarbonate 650 MG tablet 2 qam, 1 at noon, and 2 qpm 450 tablet 3    valsartan (DIOVAN) 320 MG tablet Take 1 tablet by mouth Daily.      vitamin D (ERGOCALCIFEROL) 1.25 MG (62154 UT) capsule capsule Take 1 capsule by mouth 1 (One) Time Per Week. 15 capsule 3    [DISCONTINUED] dapagliflozin Propanediol (Farxiga) 10 MG tablet Take 10 mg by mouth Daily.       Current Facility-Administered Medications for the 8/14/23 encounter (Office Visit) with Zuleika Schulte APRN   Medication Dose Route Frequency Provider Last Rate Last Admin    cyanocobalamin injection 1,000 mcg  1,000 mcg Intramuscular Q28 Days Ruthie Parra APRN   1,000 mcg at 08/11/23 1123       Allergies   Allergen Reactions    Ondansetron Anaphylaxis and GI Intolerance    Zofran [Ondansetron Hcl] Anaphylaxis    Lortab [Hydrocodone-Acetaminophen] Other (See  "Comments) and Hallucinations     CLOSTROPHOBIC    Allopurinol Other (See Comments)     Pain on right side       Social History     Socioeconomic History    Marital status:    Tobacco Use    Smoking status: Former     Packs/day: 0.50     Years: 25.00     Pack years: 12.50     Types: Cigarettes     Start date:      Quit date: 10/13/2013     Years since quittin.8     Passive exposure: Past    Smokeless tobacco: Never    Tobacco comments:     quit 2013   Vaping Use    Vaping Use: Never used   Substance and Sexual Activity    Alcohol use: Not Currently    Drug use: No    Sexual activity: Not Currently     Partners: Female       Family History   Problem Relation Age of Onset    Breast cancer Mother     Dementia Father     Glaucoma Father     No Known Problems Daughter     Colon polyps Neg Hx     Colon cancer Neg Hx        Review of Systems   Constitutional:  Negative for chills, fever and unexpected weight change.   Respiratory:  Negative for shortness of breath.    Cardiovascular:  Negative for chest pain.   Gastrointestinal:  Negative for abdominal distention, abdominal pain, anal bleeding, blood in stool, constipation, diarrhea, nausea and vomiting.      Vitals:    23 1313   BP: 154/54   Pulse: 59   Temp: 98.4 øF (36.9 øC)   SpO2: 97%   Weight: 69.4 kg (153 lb)   Height: 182.9 cm (72\")      Body mass index is 20.75 kg/mý.    Physical Exam  Vitals reviewed.   Constitutional:       General: He is not in acute distress.  Cardiovascular:      Rate and Rhythm: Normal rate and regular rhythm.      Heart sounds: Murmur (systolic) heard.   Pulmonary:      Effort: Pulmonary effort is normal.      Breath sounds: Normal breath sounds.   Abdominal:      General: Bowel sounds are normal. There is no distension.      Palpations: Abdomen is soft.      Tenderness: There is no abdominal tenderness.   Skin:     General: Skin is warm and dry.   Neurological:      Mental Status: He is alert.       Results for orders " placed or performed in visit on 08/03/23   Iron Profile    Specimen: Hand, Left; Blood   Result Value Ref Range    Iron 67 59 - 158 mcg/dL    Iron Saturation (TSAT) 27 20 - 50 %    Transferrin 164 (L) 200 - 360 mg/dL    TIBC 244 (L) 298 - 536 mcg/dL   Comprehensive Metabolic Panel    Specimen: Hand, Left; Blood   Result Value Ref Range    Glucose 134 (H) 65 - 99 mg/dL    BUN 47 (H) 8 - 23 mg/dL    Creatinine 3.19 (H) 0.76 - 1.27 mg/dL    Sodium 141 136 - 145 mmol/L    Potassium 4.5 3.5 - 5.2 mmol/L    Chloride 108 (H) 98 - 107 mmol/L    CO2 21.0 (L) 22.0 - 29.0 mmol/L    Calcium 9.0 8.6 - 10.5 mg/dL    Total Protein 6.2 6.0 - 8.5 g/dL    Albumin 3.8 3.5 - 5.2 g/dL    ALT (SGPT) 8 1 - 41 U/L    AST (SGOT) 13 1 - 40 U/L    Alkaline Phosphatase 137 (H) 39 - 117 U/L    Total Bilirubin 0.4 0.0 - 1.2 mg/dL    Globulin 2.4 gm/dL    A/G Ratio 1.6 g/dL    BUN/Creatinine Ratio 14.7 7.0 - 25.0    Anion Gap 12.0 5.0 - 15.0 mmol/L    eGFR 19.5 (L) >60.0 mL/min/1.73   CBC Auto Differential    Specimen: Hand, Left; Blood   Result Value Ref Range    WBC 4.34 3.40 - 10.80 10*3/mm3    RBC 2.38 (L) 4.14 - 5.80 10*6/mm3    Hemoglobin 7.7 (L) 13.0 - 17.7 g/dL    Hematocrit 25.5 (L) 37.5 - 51.0 %    .1 (H) 79.0 - 97.0 fL    MCH 32.4 26.6 - 33.0 pg    MCHC 30.2 (L) 31.5 - 35.7 g/dL    RDW 16.0 (H) 12.3 - 15.4 %    RDW-SD 63.4 (H) 37.0 - 54.0 fl    MPV 9.9 6.0 - 12.0 fL    Platelets 101 (L) 140 - 450 10*3/mm3    Neutrophil % 62.6 42.7 - 76.0 %    Lymphocyte % 19.4 (L) 19.6 - 45.3 %    Monocyte % 12.9 (H) 5.0 - 12.0 %    Eosinophil % 3.7 0.3 - 6.2 %    Basophil % 0.9 0.0 - 1.5 %    Neutrophils, Absolute 2.72 1.70 - 7.00 10*3/mm3    Lymphocytes, Absolute 0.84 0.70 - 3.10 10*3/mm3    Monocytes, Absolute 0.56 0.10 - 0.90 10*3/mm3    Eosinophils, Absolute 0.16 0.00 - 0.40 10*3/mm3    Basophils, Absolute 0.04 0.00 - 0.20 10*3/mm3   Type and screen    Specimen: Hand, Left; Blood   Result Value Ref Range    ABO Type O     RH type Negative      Antibody Screen Negative     T&S Expiration Date 8/6/2023 11:59:59 PM            ASSESSMENT AND PLAN    Assessment & Plan     Diagnoses and all orders for this visit:    1. Crohn's disease of large intestine without complication (Primary)    2. Coagulopathy  Comments:  plavix      Stable.  He is due for colonoscopy. However prior to scheduling I will send a letter to Dr. Pineda in regards to if the patient can safely hold plavix 7 days prior to procedure. I will contact patient once he has responded.  I have instructed the patient to contact our office if he has not heard from us within 2 weeks.  I recommend that he continue Apriso and Questran.      The patient was advised on the risks of stopping blood thinners for a procedure.  The risks discussed included the risk of developing myocardial infarction, CVA, embolus, clogging of the arteries or stents, etc.  We discussed the potential consequences of the risks discussed.  The benefits of stopping as well as the alternatives were discussed as well.   Patient is to hold their anticoagulation medication per the direction of their prescribing physician, .   A letter will be sent to prescribing This is to prevent any risk or complication from bleeding intra and post procedure. If they develop bleeding post procedure they are to go the emergency department for further evaluation and treatment immediately.        Office visit in 1 year.        There are no Patient Instructions on file for this visit.      Zuleika Schulte, STERLING      Addendum:  I did review more records.  He had ultrasound 2021 ordered by hematology that noted cirrhotic appearing liver. He does have history of alcohol with last drink 2013.  I was not aware of this when I saw him in the office.   His last hgb 7.7, plt 101, albumin normal,  creat 3.19, bun 47 . He has labs due on 8/30 as well. I did speak with him today and he has no sign of active gi bleeding. At this point I would recommend egd  to assess for any changes such as esophageal varices. He is agreeable. Will hold on colonoscopy for now.

## 2023-08-15 ENCOUNTER — TELEPHONE (OUTPATIENT)
Dept: INTERNAL MEDICINE | Facility: CLINIC | Age: 73
End: 2023-08-15

## 2023-08-15 ENCOUNTER — OFFICE VISIT (OUTPATIENT)
Dept: ONCOLOGY | Facility: CLINIC | Age: 73
End: 2023-08-15
Payer: MEDICARE

## 2023-08-15 VITALS
RESPIRATION RATE: 18 BRPM | BODY MASS INDEX: 21.1 KG/M2 | HEART RATE: 51 BPM | OXYGEN SATURATION: 97 % | DIASTOLIC BLOOD PRESSURE: 64 MMHG | SYSTOLIC BLOOD PRESSURE: 120 MMHG | TEMPERATURE: 97.6 F | WEIGHT: 155.8 LBS | HEIGHT: 72 IN

## 2023-08-15 DIAGNOSIS — D63.1 ANEMIA DUE TO STAGE 4 CHRONIC KIDNEY DISEASE: ICD-10-CM

## 2023-08-15 DIAGNOSIS — D50.9 IRON DEFICIENCY ANEMIA, UNSPECIFIED IRON DEFICIENCY ANEMIA TYPE: ICD-10-CM

## 2023-08-15 DIAGNOSIS — N18.4 ANEMIA DUE TO STAGE 4 CHRONIC KIDNEY DISEASE: ICD-10-CM

## 2023-08-15 DIAGNOSIS — K74.60 HEPATIC CIRRHOSIS, UNSPECIFIED HEPATIC CIRRHOSIS TYPE, UNSPECIFIED WHETHER ASCITES PRESENT: ICD-10-CM

## 2023-08-15 DIAGNOSIS — C91.10 CLL (CHRONIC LYMPHOCYTIC LEUKEMIA): Primary | ICD-10-CM

## 2023-08-15 NOTE — TELEPHONE ENCOUNTER
"  Caller: Ricardo Hugo \"Harjinder\"    Relationship: Self    Best call back number: [phone]     What is the best time to reach you: ANY    Who are you requesting to speak with (clinical staff, provider,  specific staff member): CLINICAL    Do you know the name of the person who called: SELF    What was the call regarding: WANTS TO CONFIRM OFFICE RECEIVED THE REQUEST FOR PRIOR AUTHORIZATION FROM SHERRI TOMAS FROM MICHAEL KAYE FOR THE MEDICATION [facility]     Is it okay if the provider responds through [facility]: PREFERS CALL BACK           "

## 2023-08-15 NOTE — PROGRESS NOTES
[facility] HEMATOLOGY & ONCOLOGY  [address]  [facility] [address] [phone]    Patient Name: Ricardo Hugo  Encounter Date: 08/15/2023  YOB: 1950   Patient Number: [id number]    Hematology / Oncology Progress Note      HPI / REASON FOR VISIT: Ricardo Hugo is a 73 y.o. male who is followed by this office for CLL, Iron Deficiency Anemia, Chronic Kidney Disease.  He sees Dr. Rueda for nephrology.      He has received Injectafer for WALE and Retacrit for anemia in CKD in the past.  Last injection was 08/03/23 for Hgb 7.7    He presents to the clinic today for continued follow up.  He has no acute complaint.    He had labs drawn 08/03 and results were reviewed with him today     LABS    Lab Results - Last 18 Months   Lab Units 08/03/23  0829 07/18/23  1038 06/22/23  0853 06/02/23  0903 05/05/23  1004 04/14/23  0836 02/10/23  0957 01/27/23  0717 01/13/23  0811 12/30/22  0824 12/16/22  0831 12/02/22  0856 04/29/22  1214 04/01/22  0753   HEMOGLOBIN g/dL 7.7* 7.0* 10.3* 10.2* 10.2* 10.1*   < > 10.9*   < > 10.6* 11.0* 10.1*   < > 10.1*   HEMATOCRIT % 25.5* 23.4* 33.0* 33.0* 32.6* 32.7*   < > 32.9*   < > 34.3* 35.9* 32.7*   < > 31.3*   MCV fL 107.1* 105.4* 98.5* 99.7* 97.9* 100.3*   < > 97.1*   < > 98.3* 98.4* 99.7*   < > 95.7   WBC 10*3/mm3 4.34 5.13 5.26 4.66 4.92 4.98   < > 4.85   < > 5.56 5.35 4.63   < > 5.40   RDW % 16.0* 17.2* 14.8 14.6 14.5 15.7*   < > 14.3   < > 14.1 14.6 14.3   < > 13.6   MPV fL 9.9 10.0 9.3 10.2 10.3 9.6   < >  --    < > 10.1 9.8 10.1   < > 9.8   PLATELETS 10*3/mm3 101* 122* 109* 83* 100* 122*   < > 106*   < > 115* 109* 119*   < > 116*   IMM GRAN % %  --   --   --  0.2 0.4 1.0*  --   --   --  0.5 0.6* 0.4   < >  --    NEUTROS ABS 10*3/mm3 2.72 4.40  --  2.91 2.89 2.82  --  3.21  --  3.53 3.40 2.96   < > 3.76   LYMPHS ABS 10*3/mm3 0.84  --   --  0.97 1.17 1.15  --  0.92  --  1.21 1.14 0.86   < >  --     MONOS ABS 10*3/mm3 0.56  --   --  0.58 0.60 0.66  --  0.52  --  0.52 0.62 0.59   < >  --    EOS ABS 10*3/mm3 0.16  --   --  0.15 0.20 0.26  --  0.14  --  0.22 0.13 0.17   < > 0.16   BASOS ABS 10*3/mm3 0.04  --   --  0.04 0.04 0.04  --  0.04  --  0.05 0.03 0.03   < > 0.22*   IMMATURE GRANS (ABS) 10*3/mm3  --   --   --  0.01 0.02 0.05  --   --   --  0.03 0.03 0.02   < >  --    NRBC /100 WBC  --   --   --  0.0 0.0 0.0  --  0.0  --  0.0 0.0 0.0   < >  --    NEUTROPHIL % %  --  84.7*  --   --   --   --   --   --   --   --   --   --   --  69.7   MONOCYTES % %  --  4.1*  --   --   --   --   --   --   --   --   --   --   --  11.1   BASOPHIL % %  --   --   --   --   --   --   --   --   --   --   --   --   --  4.0*   ATYP LYMPH % %  --   --   --   --   --   --   --   --   --   --   --   --   --  5.1*    < > = values in this interval not displayed.       Lab Results - Last 18 Months   Lab Units 08/03/23  0829 08/01/23  1019 07/18/23  1038 06/22/23  0853 05/05/23  1004 04/14/23  0836 01/27/23  0717 01/13/23  0811 11/18/22  0856 04/01/22  0753 02/15/22  1447   GLUCOSE mg/dL 134* 111* 104* 114* 118* 140* 92 134* 116*   < > 105*   SODIUM mmol/L 141 139 137 137 137 138 143 138 141   < > 138   POTASSIUM mmol/L 4.5 4.4 4.5 4.6 4.6 4.4 4.4 4.1 4.5   < > 4.7   CO2 mmol/L 21.0* 21.7* 18.0* 19.0* 18.0* 19.0* 21.9* 20.0* 19.0*   < > 17.0*   CHLORIDE mmol/L 108* 104 104 102 105 104 108* 103 109*   < > 105   ANION GAP mmol/L 12.0  --  15.0 16.0* 14.0 15.0  --  15.0 13.0   < > 16.0*   CREATININE mg/dL 3.19* 2.85* 3.24* 2.72* 2.66* 2.83* 2.54* 2.67* 2.44*   < > 2.72*   BUN mg/dL 47* 46* 67* 54* 57* 52* 42* 39* 57*   < > 56*   BUN / CREAT RATIO  14.7 16.1 20.7 19.9 21.4 18.4 16.5 14.6 23.4   < > 20.6   CALCIUM mg/dL 9.0 8.9 8.7 9.5 9.0 8.9 8.8 8.6 9.1   < > 8.8   EGFR IF NONAFRICN AM mL/min/1.73  --   --   --   --   --   --   --   --   --   --  23*   ALK PHOS U/L 137*  --  153* 175*  --  153* 156* 160* 172*   < > 183*   TOTAL PROTEIN g/dL  6.2  --  6.3 7.4  --  6.8  --  6.8 7.0   < > 7.1   ALT (SGPT) U/L 8  --  9 17  --  16 19 21 20   < > 20   AST (SGOT) U/L 13  --  13 19  --  19 20 20 19   < > 18   BILIRUBIN mg/dL 0.4  --  0.5 0.7  --  0.5 0.4 0.4 0.3   < > 0.3   ALBUMIN g/dL 3.8  --  3.9 4.3  --  4.3 4.3 4.3 4.30   < > 4.20   GLOBULIN gm/dL 2.4  --  2.4 3.1  --  2.5  --  2.5 2.7   < > 2.9    < > = values in this interval not displayed.       No results for input(s): MSPIKE, KAPPALAMB, IGLFLC, URICACID, FREEKAPPAL, CEA, LDH, REFLABREPO in the last 14293 hours.      Lab Results - Last 18 Months   Lab Units 08/03/23  0829 07/18/23  1038 04/14/23  0836 01/27/23  0717 01/13/23  0811 11/18/22  0856 07/01/22  0812 05/27/22  1222   IRON mcg/dL 67 69 64  --  76 56* 94 94   TIBC mcg/dL 244* 247* 262*  --  244* 277* 288* 298   IRON SATURATION (TSAT) % 27 28 24  --  31 20 33 32   FERRITIN ng/mL  --  548.80* 282.40  --  435.60* 167.60 254.20 281.30   TSH uIU/mL  --   --   --  2.830  --   --   --   --          PAST MEDICAL HISTORY:  ALLERGIES:  Allergies   Allergen Reactions    Ondansetron Anaphylaxis and GI Intolerance    Zofran [Ondansetron Hcl] Anaphylaxis    Lortab [Hydrocodone-Acetaminophen] Other (See Comments) and Hallucinations     CLOSTROPHOBIC    Allopurinol Other (See Comments)     Pain on right side     CURRENT MEDICATIONS:  Outpatient Encounter Medications as of 8/15/2023   Medication Sig Dispense Refill    acetaminophen (Tylenol) 325 MG tablet Take 3 tablets by mouth Every 8 (Eight) Hours. Take every 8 hours for 3 days then take prn as needed. 100 tablet 2    albuterol sulfate  (90 Base) MCG/ACT inhaler USE 2 INHALATIONS FOUR TIMES A DAY AS NEEDED 20.1 g 3    ALPRAZolam (XANAX) 0.25 MG tablet TAKE 1 TABLET BY MOUTH AT NIGHT AS NEEDED FOR ANXIETY 30 tablet 2    aspirin (aspirin) 81 MG EC tablet Take  by mouth Daily.      atorvastatin (LIPITOR) 10 MG tablet Take 1 tablet by mouth Daily. 90 tablet 3    azelastine (ASTELIN) 0.1 % nasal spray USE  1 SPRAY IN EACH NOSTRIL TWICE A DAY AS NEEDED 90 mL 1    carvedilol (Coreg) 6.25 MG tablet Take 1 tablet by mouth 2 (Two) Times a Day With Meals. 180 tablet 3    cholestyramine (QUESTRAN) 4 g packet Take 1 packet by mouth Daily. 90 packet 3    cloNIDine (Catapres) 0.3 MG tablet Take 1 tablet by mouth 3 (Three) Times a Day. 270 tablet 3    clopidogrel (PLAVIX) 75 MG tablet TAKE 1 TABLET DAILY 90 tablet 3    Copper Gluconate (Copper Caps) 2 MG capsule Take  by mouth.      dapagliflozin Propanediol (Farxiga) 10 MG tablet Take 10 mg by mouth Daily. 30 tablet 0    dapagliflozin Propanediol (Farxiga) 10 MG tablet Take 10 mg by mouth Daily. 90 tablet 3    diphenhydrAMINE HCl, Sleep, (ZzzQuil) 25 MG capsule Take  by mouth Every Night.      fexofenadine (ALLEGRA) 180 MG tablet Take 1 tablet by mouth Daily.      fluticasone (FLONASE) 50 MCG/ACT nasal spray 2 sprays into the nostril(s) as directed by provider Daily As Needed for Rhinitis.      furosemide (LASIX) 20 MG tablet TAKE 1 TABLET TWICE A  tablet 3    hydrALAZINE (APRESOLINE) 100 MG tablet Take 1 tablet by mouth 3 (Three) Times a Day. 100mg daily      levothyroxine (Synthroid) 75 MCG tablet Take 1 tablet by mouth Daily. 90 tablet 3    magnesium chloride ER 64 MG DR tablet Take 143 mg by mouth Daily.      Melatonin 10 MG capsule Take 2 capsules by mouth Every Night.      mesalamine (APRISO) 0.375 g 24 hr capsule Take 4 capsules by mouth Daily. 360 capsule 3    montelukast (SINGULAIR) 10 MG tablet Take 1 tablet by mouth Every Night. 90 tablet 3    Multiple Vitamins-Minerals (PRESERVISION/LUTEIN) capsule Take 1 capsule by mouth 2 (two) times a day.      NIFEdipine CC (ADALAT CC) 60 MG 24 hr tablet Take 1 tablet by mouth Daily. 90 tablet 3    omeprazole (priLOSEC) 20 MG capsule TAKE 1 CAPSULE DAILY 90 capsule 1    Oxymetazoline HCl (Nasal Spray) 0.05 % solution       potassium chloride 10 MEQ CR tablet Take 1 tablet by mouth 2 (Two) Times a Day. 180 tablet 3     sodium bicarbonate 650 MG tablet 2 qam, 1 at noon, and 2 qpm 450 tablet 3    valsartan (DIOVAN) 320 MG tablet Take 1 tablet by mouth Daily.      vitamin D (ERGOCALCIFEROL) 1.25 MG (14611 UT) capsule capsule Take 1 capsule by mouth 1 (One) Time Per Week. 15 capsule 3     Facility-Administered Encounter Medications as of 8/15/2023   Medication Dose Route Frequency Provider Last Rate Last Admin    cyanocobalamin injection 1,000 mcg  1,000 mcg Intramuscular Q28 Days Ruthie Parra, [place]   1,000 mcg at 08/11/23 1123     ADULT ILLNESSES:  Patient Active Problem List   Diagnosis Code    Chronic rhinitis J31.0    Resistant hypertension I10    Chronic diarrhea K52.9    Symptomatic anemia D64.9    Wellness examination Z00.00    PAD (peripheral artery disease) I73.9    IHD (ischemic heart disease) I25.9    Carotid stenosis I65.29    Stenosis of right carotid artery I65.21    Carotid stenosis, right I65.21    Diastolic dysfunction I51.89    CRD (chronic renal disease), stage IV N18.4    Anemia due to chronic kidney disease N18.9, D63.1    Copper deficiency E61.0    Low iron  E61.1    CLL (chronic lymphocytic leukemia) C91.10    Perforation of left tympanic membrane H72.92    Mixed hyperlipidemia E78.2    Systolic murmur R01.1    Eustachian tube dysfunction, bilateral H69.83    Sensorineural hearing loss (SNHL), bilateral H90.3    Anticoagulated Z79.01    Nasal septal deviation J34.2    Hypertrophy of inferior nasal turbinate J34.3    Unilateral recurrent inguinal hernia without obstruction or gangrene K40.91    Bilateral inguinal hernia without obstruction or gangrene K40.20    Sleep difficulties G47.9     SURGERIES:  Past Surgical History:   Procedure Laterality Date    ARTERY SURGERY  2021    right side on neck    CAROTID ENDARTERECTOMY Right 5/10/2021    Procedure: RIGHT CAROTID ENDARTERECTOMY WITH EEG;  Surgeon: Gil Pineda DO;  Location: [place] [address];  Service: Vascular;  Laterality: Right;     COLONOSCOPY N/A 7/2/2020    Procedure: COLONOSCOPY WITH ANESTHESIA;  Surgeon: Adrien Brewster MD;  Location: [facility] ENDOSCOPY;  Service: Gastroenterology;  Laterality: N/A;  pre op: diarrhea  post op: polyps  PCP: Joe Velasco MD    COLONOSCOPY N/A 10/13/2020    Procedure: COLONOSCOPY WITH ANESTHESIA;  Surgeon: Adrien Brewster MD;  Location: [facility] ENDOSCOPY;  Service: Gastroenterology;  Laterality: N/A;  Pre: Chronic Diarrhea, Crohn's  Post: AVM  Dr. Neftali Velasco  CO2 Inflation Used    CORONARY ARTERY BYPASS GRAFT  2003    x3    ENDOSCOPY N/A 11/2/2021    Procedure: ESOPHAGOGASTRODUODENOSCOPY WITH ANESTHESIA;  Surgeon: Bridger Bell MD;  Location: [facility] ENDOSCOPY;  Service: Gastroenterology;  Laterality: N/A;  pre anemia;gi bleed  post  gi bleed;schatski ring  Dr. ERIC Velasco    EYE SURGERY Bilateral     catorac    INCISION AND DRAINAGE PERIRECTAL ABSCESS N/A 3/3/2017    Procedure: INCISION AND DRAINAGE OF JEET ANAL ABSCESS;  Surgeon: Lynette Smith MD;  Location: [facility] OR;  Service:     INGUINAL HERNIA REPAIR Bilateral 6/27/2023    Procedure: INGUINAL HERNIA BILATERAL REPAIR LAPAROSCOPIC WITH DAVINCI ROBOT WITH MESH;  Surgeon: Tahira Rivera MD;  Location: [facility] OR;  Service: Robotics - DaVinci;  Laterality: Bilateral;    MYRINGOTOMY W/ TUBES Left 04/17/2017    06/10/2016    TONSILLECTOMY      TOTAL HIP ARTHROPLASTY Right 2006     HEALTH MAINTENANCE ITEMS:  Health Maintenance Due   Topic Date Due    TDAP/TD VACCINES (1 - Tdap) Never done    Hepatitis B (1 of 3 - Risk 3-dose series) Never done    Pneumococcal Vaccine 65+ (3 - PPSV23 or PCV20) 12/28/2015    COVID-19 Vaccine (6 - Moderna risk series) 11/03/2022       <no information>  Last Completed Colonoscopy            COLORECTAL CANCER SCREENING (COLONOSCOPY - Every 3 Years) Next due on 10/13/2023      10/28/2021  POC Occult Blood Stool    10/13/2020  Surgical Procedure: COLONOSCOPY    10/13/2020  COLONOSCOPY    07/02/2020  Surgical  Procedure: COLONOSCOPY    2020  COLONOSCOPY    Only the first 5 history entries have been loaded, but more history exists.                  Immunization History   Administered Date(s) Administered    COVID-19 (MODERNA) 1st,2nd,3rd Dose Monovalent 2021, 2021, 2021, 2022    COVID-19 (MODERNA) BIVALENT 12+YRS 2022    Flu Vaccine Split Quad 2019    Fluzone >6mos 2020    Fluzone High-Dose 65+yrs 10/12/2021, 2022    Fluzone Quad >6mos (Multi-dose) 2018, 2019, 2020    Influenza TIV (IM) 10/18/2017    Influenza Whole 2015    Pneumococcal Conjugate 13-Valent (PCV13) 2014    Pneumococcal Polysaccharide (PPSV23) 2006    Shingrix 2021, 2022    Zostavax 2014     Last Completed Mammogram       This patient has no relevant Health Maintenance data.              FAMILY HISTORY:  Family History   Problem Relation Age of Onset    Breast cancer Mother     Dementia Father     Glaucoma Father     No Known Problems Daughter     Colon polyps Neg Hx     Colon cancer Neg Hx      SOCIAL HISTORY:  Social History     Socioeconomic History    Marital status:    Tobacco Use    Smoking status: Former     Packs/day: 0.50     Years: 25.00     Pack years: 12.50     Types: Cigarettes     Start date:      Quit date: 10/13/2013     Years since quittin.8     Passive exposure: Past    Smokeless tobacco: Never    Tobacco comments:     quit    Vaping Use    Vaping Use: Never used   Substance and Sexual Activity    Alcohol use: Not Currently    Drug use: No    Sexual activity: Not Currently     Partners: Female       REVIEW OF SYSTEMS:  Review of Systems   Constitutional:  Negative for activity change, appetite change, fatigue, fever, unexpected weight gain and unexpected weight loss.   HENT:  Negative for dental problem, facial swelling, swollen glands and trouble swallowing.    Eyes:  Negative for double vision and discharge.  "  Respiratory:  Negative for cough, shortness of breath and wheezing.    Cardiovascular:  Negative for chest pain, palpitations and leg swelling.   Gastrointestinal:  Negative for abdominal pain, blood in stool, nausea and vomiting.   Endocrine: Negative.    Genitourinary:  Negative for dysuria and hematuria.   Musculoskeletal:  Negative for arthralgias and myalgias.   Skin:  Negative for rash, skin lesions and wound.   Allergic/Immunologic: Negative for immunocompromised state.   Neurological:  Negative for speech difficulty, light-headedness, headache, memory problem and confusion.   Hematological:  Negative for adenopathy.   Psychiatric/Behavioral:  Negative for self-injury, suicidal ideas and depressed mood. The patient is not nervous/anxious.      /64   Pulse 51   Temp 97.6 øF (36.4 øC) (Temporal)   Resp 18   Ht 182.9 cm (72\")   Wt 70.7 kg (155 lb 12.8 oz)   SpO2 97%   BMI 21.13 kg/mý  Body surface area is 1.92 meters squared.    Pain Score    08/15/23 0803   PainSc: 0-No pain       Physical Exam  Constitutional:       Appearance: Normal appearance.   HENT:      Head: Normocephalic and atraumatic.   Cardiovascular:      Rate and Rhythm: Normal rate and regular rhythm.   Pulmonary:      Effort: Pulmonary effort is normal.      Breath sounds: Normal breath sounds.   Abdominal:      General: Bowel sounds are normal.      Palpations: Abdomen is soft.   Musculoskeletal:      Right lower leg: No edema.      Left lower leg: No edema.   Skin:     General: Skin is warm and dry.   Neurological:      Mental Status: He is alert and oriented to person, place, and time.   Psychiatric:         Attention and Perception: Attention normal.         Mood and Affect: Mood normal.         Judgment: Judgment normal.       Ricardo Hugo reports a pain score of 0.  No intervention indicated         ASSESSMENT / PLAN    1. CLL (chronic lymphocytic leukemia)    2. Anemia due to stage 4 chronic kidney disease    3. Iron " deficiency anemia, unspecified iron deficiency anemia type    4. Hepatic cirrhosis, unspecified hepatic cirrhosis type, unspecified whether ascites present       ASSESSMENT:      retacrit feb 17  injectafer dec 15    1.  CLL  - Clinical stage 0 from 7/20/2021:  - Lymphocytosis: ALC 0.84 , Adenopathy: Absent, Organomegaly: Absent,   Anemia: Hgb 7.7, Thrombocytopenia: Plt 101  -No unintentional weight loss, fever or night sweats   PLAN:  Stable for observation    2.  Anemia in Chronic Kidney Disease Stage IV  -8/3/23:  Hgb 7.7, Hct 25.5, Iron 67, Sat 27%, TIBC 244  BUN 47, Creatinine 3.19, GFR 19.5  Pt states he is having Labs at [facility] today   -Will Continue Retacrit biwekly for Hgb < 11 Or HCT < 33   -[facility] Francis[facility]    3.  Iron Deficiency Anemia   -Iron 67, Sat 27%, TIBC 244  -Had Injectafer December 1, 2022  -Stable for observation     4. Cirrhosis of Liver  -History of chronic alcoholism  - Cirrhotic morphology/appearance of the liver on 07/12/21 ultrasound  - Alk phos 137  -PLAN:  Stable for observation     PLAN:  -Pt states he is getting labs drawn today at [facility] . Will use those results  Continue Retacrit 40K units  biweekly for Hgb < 11 or Hct < 33  -Continue current medications, treatment plans and follow up with PCP and any other specialist  Return to office in 3 months with CBC, CMP, Iron Profile and Ferritin   Care discussed with patient.  Understanding expressed.  Patient agreeable with plan.      Becky Camacho, [place]  08/15/2023

## 2023-08-16 ENCOUNTER — INFUSION (OUTPATIENT)
Dept: ONCOLOGY | Facility: HOSPITAL | Age: 73
End: 2023-08-16
Payer: MEDICARE

## 2023-08-16 ENCOUNTER — APPOINTMENT (OUTPATIENT)
Dept: LAB | Facility: HOSPITAL | Age: 73
End: 2023-08-16
Payer: MEDICARE

## 2023-08-16 VITALS
TEMPERATURE: 97.6 F | SYSTOLIC BLOOD PRESSURE: 113 MMHG | RESPIRATION RATE: 18 BRPM | HEIGHT: 72 IN | OXYGEN SATURATION: 98 % | BODY MASS INDEX: 20.86 KG/M2 | WEIGHT: 154 LBS | DIASTOLIC BLOOD PRESSURE: 34 MMHG | HEART RATE: 50 BPM

## 2023-08-16 DIAGNOSIS — D64.9 SYMPTOMATIC ANEMIA: Primary | ICD-10-CM

## 2023-08-16 PROCEDURE — G0463 HOSPITAL OUTPT CLINIC VISIT: HCPCS

## 2023-08-16 NOTE — PROGRESS NOTES
0852 Called Becky Camacho's APRN office and spoke with Kimber JORDAN to report patient had labs drawn yesterday at [facility] doctor and was expecting to use them for today. Kimber called kidney doctor and no lab results yet due to sent out. Patient was informed and did not want labs drawn today wants us to call when labs are resulted if needs injection. Message sent to hem/onc per secure chat. Rabia Flores RN

## 2023-08-18 ENCOUNTER — TELEPHONE (OUTPATIENT)
Dept: OTOLARYNGOLOGY | Facility: CLINIC | Age: 73
End: 2023-08-18
Payer: MEDICARE

## 2023-08-21 ENCOUNTER — PREP FOR SURGERY (OUTPATIENT)
Dept: OTHER | Facility: HOSPITAL | Age: 73
End: 2023-08-21
Payer: MEDICARE

## 2023-08-21 ENCOUNTER — TELEPHONE (OUTPATIENT)
Dept: GASTROENTEROLOGY | Facility: CLINIC | Age: 73
End: 2023-08-21
Payer: MEDICARE

## 2023-08-21 DIAGNOSIS — K50.10 CROHN'S DISEASE OF LARGE INTESTINE WITHOUT COMPLICATION: Primary | ICD-10-CM

## 2023-08-21 DIAGNOSIS — D63.1 ANEMIA DUE TO STAGE 4 CHRONIC KIDNEY DISEASE: Primary | ICD-10-CM

## 2023-08-21 DIAGNOSIS — N18.4 ANEMIA DUE TO STAGE 4 CHRONIC KIDNEY DISEASE: Primary | ICD-10-CM

## 2023-08-21 RX ORDER — POLYETHYLENE GLYCOL 3350, SODIUM SULFATE ANHYDROUS, SODIUM BICARBONATE, SODIUM CHLORIDE, POTASSIUM CHLORIDE 236; 22.74; 6.74; 5.86; 2.97 G/4L; G/4L; G/4L; G/4L; G/4L
4 POWDER, FOR SOLUTION ORAL ONCE
Qty: 4000 ML | Refills: 0 | Status: SHIPPED | OUTPATIENT
Start: 2023-08-21 | End: 2023-08-21

## 2023-08-21 NOTE — TELEPHONE ENCOUNTER
Please let patient know that Dr. Pineda approves him to hold plavix x 7 days prior to procedure. He needs to continue aspirin. Also he needs to hold questran the day before procedure. Thank you     Schedule colonoscopy. Golytely prep.     ----- Message from Gil Pineda DO sent at 8/18/2023  3:09 PM CDT -----  He is ok to hold Plavix, continue aspirin  ----- Message -----  From: Zuleika Schulte APRN  Sent: 8/16/2023   4:08 PM CDT  To: Gil Pineda DO

## 2023-08-24 ENCOUNTER — TELEPHONE (OUTPATIENT)
Dept: GASTROENTEROLOGY | Facility: CLINIC | Age: 73
End: 2023-08-24
Payer: MEDICARE

## 2023-08-24 ENCOUNTER — PREP FOR SURGERY (OUTPATIENT)
Dept: OTHER | Facility: HOSPITAL | Age: 73
End: 2023-08-24
Payer: MEDICARE

## 2023-08-24 DIAGNOSIS — D64.9 ANEMIA: Primary | ICD-10-CM

## 2023-08-24 DIAGNOSIS — K74.60 CIRRHOSIS OF LIVER WITHOUT ASCITES, UNSPECIFIED HEPATIC CIRRHOSIS TYPE: Primary | ICD-10-CM

## 2023-08-24 NOTE — TELEPHONE ENCOUNTER
Umu, I spoke with the patient this morning. We are going to hold on colonoscopy for now. Instead he is going to have egd due to anemia and history of ultrasound that noted cirrhotic liver. He can hold plavix x 7 days but continue aspirin.  Thank you

## 2023-08-30 ENCOUNTER — INFUSION (OUTPATIENT)
Dept: ONCOLOGY | Facility: HOSPITAL | Age: 73
End: 2023-08-30
Payer: MEDICARE

## 2023-08-30 ENCOUNTER — LAB (OUTPATIENT)
Dept: LAB | Facility: HOSPITAL | Age: 73
End: 2023-08-30
Payer: MEDICARE

## 2023-08-30 ENCOUNTER — TELEPHONE (OUTPATIENT)
Dept: GASTROENTEROLOGY | Facility: CLINIC | Age: 73
End: 2023-08-30
Payer: MEDICARE

## 2023-08-30 VITALS
OXYGEN SATURATION: 98 % | SYSTOLIC BLOOD PRESSURE: 95 MMHG | HEART RATE: 44 BPM | RESPIRATION RATE: 18 BRPM | TEMPERATURE: 97.5 F | DIASTOLIC BLOOD PRESSURE: 31 MMHG

## 2023-08-30 DIAGNOSIS — D63.1 ANEMIA DUE TO STAGE 4 CHRONIC KIDNEY DISEASE: ICD-10-CM

## 2023-08-30 DIAGNOSIS — N18.4 ANEMIA DUE TO STAGE 4 CHRONIC KIDNEY DISEASE: ICD-10-CM

## 2023-08-30 DIAGNOSIS — N18.4 ANEMIA DUE TO STAGE 4 CHRONIC KIDNEY DISEASE: Primary | ICD-10-CM

## 2023-08-30 DIAGNOSIS — D63.1 ANEMIA DUE TO STAGE 4 CHRONIC KIDNEY DISEASE: Primary | ICD-10-CM

## 2023-08-30 DIAGNOSIS — K74.60 CIRRHOSIS OF LIVER WITHOUT ASCITES, UNSPECIFIED HEPATIC CIRRHOSIS TYPE: ICD-10-CM

## 2023-08-30 LAB
DEPRECATED RDW RBC AUTO: 51.8 FL (ref 37–54)
ERYTHROCYTE [DISTWIDTH] IN BLOOD BY AUTOMATED COUNT: 13.7 % (ref 12.3–15.4)
HCT VFR BLD AUTO: 29.6 % (ref 37.5–51)
HGB BLD-MCNC: 9.1 G/DL (ref 13–17.7)
INR PPP: 1.23 (ref 0.91–1.09)
MCH RBC QN AUTO: 31.8 PG (ref 26.6–33)
MCHC RBC AUTO-ENTMCNC: 30.7 G/DL (ref 31.5–35.7)
MCV RBC AUTO: 103.5 FL (ref 79–97)
PLATELET # BLD AUTO: 101 10*3/MM3 (ref 140–450)
PMV BLD AUTO: 9.6 FL (ref 6–12)
PROTHROMBIN TIME: 15.7 SECONDS (ref 11.8–14.8)
RBC # BLD AUTO: 2.86 10*6/MM3 (ref 4.14–5.8)
WBC NRBC COR # BLD: 5.13 10*3/MM3 (ref 3.4–10.8)

## 2023-08-30 PROCEDURE — 25010000002 EPOETIN ALFA PER 1000 UNITS: Performed by: NURSE PRACTITIONER

## 2023-08-30 PROCEDURE — 96372 THER/PROPH/DIAG INJ SC/IM: CPT

## 2023-08-30 PROCEDURE — 36415 COLL VENOUS BLD VENIPUNCTURE: CPT

## 2023-08-30 PROCEDURE — 85610 PROTHROMBIN TIME: CPT

## 2023-08-30 PROCEDURE — 85027 COMPLETE CBC AUTOMATED: CPT

## 2023-08-30 RX ADMIN — ERYTHROPOIETIN 40000 UNITS: 40000 INJECTION, SOLUTION INTRAVENOUS; SUBCUTANEOUS at 08:41

## 2023-09-13 ENCOUNTER — LAB (OUTPATIENT)
Dept: LAB | Facility: HOSPITAL | Age: 73
End: 2023-09-13
Payer: MEDICARE

## 2023-09-13 ENCOUNTER — INFUSION (OUTPATIENT)
Dept: ONCOLOGY | Facility: HOSPITAL | Age: 73
End: 2023-09-13
Payer: MEDICARE

## 2023-09-13 VITALS
HEIGHT: 72 IN | HEART RATE: 53 BPM | OXYGEN SATURATION: 97 % | WEIGHT: 157 LBS | RESPIRATION RATE: 16 BRPM | TEMPERATURE: 96.9 F | DIASTOLIC BLOOD PRESSURE: 50 MMHG | BODY MASS INDEX: 21.26 KG/M2 | SYSTOLIC BLOOD PRESSURE: 132 MMHG

## 2023-09-13 DIAGNOSIS — D63.1 ANEMIA DUE TO STAGE 4 CHRONIC KIDNEY DISEASE: Primary | ICD-10-CM

## 2023-09-13 DIAGNOSIS — N18.4 ANEMIA DUE TO STAGE 4 CHRONIC KIDNEY DISEASE: ICD-10-CM

## 2023-09-13 DIAGNOSIS — N18.4 ANEMIA DUE TO STAGE 4 CHRONIC KIDNEY DISEASE: Primary | ICD-10-CM

## 2023-09-13 DIAGNOSIS — D63.1 ANEMIA DUE TO STAGE 4 CHRONIC KIDNEY DISEASE: ICD-10-CM

## 2023-09-13 LAB
DEPRECATED RDW RBC AUTO: 55.4 FL (ref 37–54)
ERYTHROCYTE [DISTWIDTH] IN BLOOD BY AUTOMATED COUNT: 14.5 % (ref 12.3–15.4)
HCT VFR BLD AUTO: 31.2 % (ref 37.5–51)
HGB BLD-MCNC: 9.4 G/DL (ref 13–17.7)
MCH RBC QN AUTO: 31.3 PG (ref 26.6–33)
MCHC RBC AUTO-ENTMCNC: 30.1 G/DL (ref 31.5–35.7)
MCV RBC AUTO: 104 FL (ref 79–97)
PLATELET # BLD AUTO: 102 10*3/MM3 (ref 140–450)
PMV BLD AUTO: 9.7 FL (ref 6–12)
RBC # BLD AUTO: 3 10*6/MM3 (ref 4.14–5.8)
WBC NRBC COR # BLD: 7.12 10*3/MM3 (ref 3.4–10.8)

## 2023-09-13 PROCEDURE — 25010000002 EPOETIN ALFA PER 1000 UNITS: Performed by: NURSE PRACTITIONER

## 2023-09-13 PROCEDURE — 85027 COMPLETE CBC AUTOMATED: CPT

## 2023-09-13 PROCEDURE — 96372 THER/PROPH/DIAG INJ SC/IM: CPT

## 2023-09-13 PROCEDURE — 80048 BASIC METABOLIC PNL TOTAL CA: CPT | Performed by: INTERNAL MEDICINE

## 2023-09-13 RX ADMIN — ERYTHROPOIETIN 40000 UNITS: 40000 INJECTION, SOLUTION INTRAVENOUS; SUBCUTANEOUS at 08:57

## 2023-09-16 DIAGNOSIS — I25.9 IHD (ISCHEMIC HEART DISEASE): ICD-10-CM

## 2023-09-16 DIAGNOSIS — I51.89 DIASTOLIC DYSFUNCTION: ICD-10-CM

## 2023-09-16 DIAGNOSIS — I10 RESISTANT HYPERTENSION: ICD-10-CM

## 2023-09-18 DIAGNOSIS — I73.9 PAD (PERIPHERAL ARTERY DISEASE): ICD-10-CM

## 2023-09-18 RX ORDER — CARVEDILOL 6.25 MG/1
TABLET ORAL
Qty: 60 TABLET | OUTPATIENT
Start: 2023-09-18

## 2023-09-18 RX ORDER — CLONIDINE HYDROCHLORIDE 0.3 MG/1
TABLET ORAL
Qty: 90 TABLET | OUTPATIENT
Start: 2023-09-18

## 2023-09-18 RX ORDER — ATORVASTATIN CALCIUM 10 MG/1
TABLET, FILM COATED ORAL
Qty: 90 TABLET | Refills: 3 | Status: SHIPPED | OUTPATIENT
Start: 2023-09-18

## 2023-09-27 ENCOUNTER — OFFICE VISIT (OUTPATIENT)
Dept: OTOLARYNGOLOGY | Facility: CLINIC | Age: 73
End: 2023-09-27
Payer: MEDICARE

## 2023-09-27 ENCOUNTER — LAB (OUTPATIENT)
Dept: LAB | Facility: HOSPITAL | Age: 73
End: 2023-09-27
Payer: MEDICARE

## 2023-09-27 ENCOUNTER — INFUSION (OUTPATIENT)
Dept: ONCOLOGY | Facility: HOSPITAL | Age: 73
End: 2023-09-27
Payer: MEDICARE

## 2023-09-27 VITALS — WEIGHT: 153 LBS | TEMPERATURE: 98.5 F | BODY MASS INDEX: 20.72 KG/M2 | HEIGHT: 72 IN

## 2023-09-27 VITALS
HEIGHT: 72 IN | SYSTOLIC BLOOD PRESSURE: 137 MMHG | OXYGEN SATURATION: 98 % | WEIGHT: 155 LBS | DIASTOLIC BLOOD PRESSURE: 44 MMHG | HEART RATE: 53 BPM | TEMPERATURE: 97.4 F | RESPIRATION RATE: 18 BRPM | BODY MASS INDEX: 20.99 KG/M2

## 2023-09-27 DIAGNOSIS — J34.3 HYPERTROPHY OF INFERIOR NASAL TURBINATE: ICD-10-CM

## 2023-09-27 DIAGNOSIS — N18.4 ANEMIA DUE TO STAGE 4 CHRONIC KIDNEY DISEASE: ICD-10-CM

## 2023-09-27 DIAGNOSIS — D63.1 ANEMIA DUE TO STAGE 4 CHRONIC KIDNEY DISEASE: Primary | ICD-10-CM

## 2023-09-27 DIAGNOSIS — J31.0 CHRONIC RHINITIS: ICD-10-CM

## 2023-09-27 DIAGNOSIS — N18.4 ANEMIA DUE TO STAGE 4 CHRONIC KIDNEY DISEASE: Primary | ICD-10-CM

## 2023-09-27 DIAGNOSIS — J34.2 NASAL SEPTAL DEVIATION: Primary | ICD-10-CM

## 2023-09-27 DIAGNOSIS — D63.1 ANEMIA DUE TO STAGE 4 CHRONIC KIDNEY DISEASE: ICD-10-CM

## 2023-09-27 DIAGNOSIS — H72.92 PERFORATION OF LEFT TYMPANIC MEMBRANE: ICD-10-CM

## 2023-09-27 LAB
DEPRECATED RDW RBC AUTO: 52.9 FL (ref 37–54)
ERYTHROCYTE [DISTWIDTH] IN BLOOD BY AUTOMATED COUNT: 14.2 % (ref 12.3–15.4)
HCT VFR BLD AUTO: 34.4 % (ref 37.5–51)
HGB BLD-MCNC: 10.5 G/DL (ref 13–17.7)
MCH RBC QN AUTO: 31.3 PG (ref 26.6–33)
MCHC RBC AUTO-ENTMCNC: 30.5 G/DL (ref 31.5–35.7)
MCV RBC AUTO: 102.7 FL (ref 79–97)
PLATELET # BLD AUTO: 113 10*3/MM3 (ref 140–450)
PMV BLD AUTO: 10.1 FL (ref 6–12)
RBC # BLD AUTO: 3.35 10*6/MM3 (ref 4.14–5.8)
WBC NRBC COR # BLD: 6.55 10*3/MM3 (ref 3.4–10.8)

## 2023-09-27 PROCEDURE — 85027 COMPLETE CBC AUTOMATED: CPT

## 2023-09-27 PROCEDURE — 96372 THER/PROPH/DIAG INJ SC/IM: CPT

## 2023-09-27 PROCEDURE — 25010000002 EPOETIN ALFA PER 1000 UNITS: Performed by: NURSE PRACTITIONER

## 2023-09-27 PROCEDURE — 36415 COLL VENOUS BLD VENIPUNCTURE: CPT

## 2023-09-27 RX ADMIN — ERYTHROPOIETIN 40000 UNITS: 40000 INJECTION, SOLUTION INTRAVENOUS; SUBCUTANEOUS at 08:46

## 2023-09-27 NOTE — PROGRESS NOTES
Nathan Chen MD  [facility]  [address], [address]  [facility] [address]  Fax [phone]  Phone [phone]      Visit Type: FOLLOW UP   Chief Complaint   Patient presents with    Sinus Problem        Sinus Problem    Ricardo Hugo is a  73 y.o. male who is here for follow up.  He has been considering nasal septoplasty for chronic nasal obstruction that has been resistant to multiple medications including nasal sprays, over-the-counter antihistamines, antibiotics steroids and sinus rinses.  He reports bilateral obstruction.       Past Medical History:   Diagnosis Date    3-vessel CAD 8/11/2020    Allergic rhinitis     Anxiety disorder 4/27/2020    Arthritis     Asymmetrical sensorineural hearing loss 6/28/2017    Atherosclerosis of native artery of both lower extremities with intermittent claudication 7/18/2019    Avascular necrosis of femoral head, left 07/11/2020    right hip after surgery    Carotid stenosis     Chronic mucoid otitis media     Chronic rhinitis     Coronary artery disease     HEART BYPASS 2004    Crohn's disease of large intestine with other complication 7/30/2020    Chronic diarrhea Colonoscopy July 2020 revealed mild patchy scattered hemosiderin staining with inflammation more so in rectosigmoid area.  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    Iron deficiency anemia 7/14/2020    Mixed hearing loss of left ear     PAD (peripheral artery disease) 8/11/2020    Perianal abscess     Pernicious anemia 08/17/2020    took shots but never diagnosed with b12 deficiency     Personal history of alcoholism 08/11/2020    quit drinking in 2013    Prostatic hypertrophy 8/11/2020    Sensorineural hearing loss     Sepsis with acute renal failure 9/15/2020    Shortness of breath 5/27/2021    Tinnitus     Ventricular tachycardia, nonsustained 7/14/2020    Weight loss 7/11/2020       Past Surgical History:   Procedure Laterality Date    ARTERY SURGERY  2021    right side on neck    CAROTID ENDARTERECTOMY Right 5/10/2021    Procedure: RIGHT CAROTID ENDARTERECTOMY WITH EEG;  Surgeon: Gil Pineda DO;  Location: [facility] HYBRID OR 12;  Service: Vascular;  Laterality: Right;    COLONOSCOPY N/A 7/2/2020    Procedure: COLONOSCOPY WITH ANESTHESIA;  Surgeon: Adrien Brewster MD;  Location: [facility] ENDOSCOPY;  Service: Gastroenterology;  Laterality: N/A;  pre op: diarrhea  post op: polyps  PCP: Joe Velasco MD    COLONOSCOPY N/A 10/13/2020    Procedure: COLONOSCOPY WITH ANESTHESIA;  Surgeon: Adrien Brewster MD;  Location: [facility] ENDOSCOPY;  Service: Gastroenterology;  Laterality: N/A;  Pre: Chronic Diarrhea, Crohn's  Post: AVM  Dr. Neftali Velasco  CO2 Inflation Used    CORONARY ARTERY BYPASS GRAFT  2003    x3    ENDOSCOPY N/A 11/2/2021    Procedure: ESOPHAGOGASTRODUODENOSCOPY WITH ANESTHESIA;  Surgeon: Bridger Bell MD;  Location: [facility] ENDOSCOPY;  Service: Gastroenterology;  Laterality: N/A;  pre anemia;gi bleed  post  gi bleed;schatski ring  Dr. ERIC Velasco    EYE SURGERY Bilateral     catorac    INCISION AND DRAINAGE PERIRECTAL ABSCESS N/A 3/3/2017    Procedure: INCISION AND DRAINAGE OF JEET ANAL ABSCESS;  Surgeon: Lynette Smith MD;  Location: [facility] OR;  Service:     INGUINAL HERNIA REPAIR Bilateral 6/27/2023    Procedure: INGUINAL HERNIA BILATERAL REPAIR LAPAROSCOPIC WITH DAVINCI ROBOT WITH MESH;  Surgeon: Tahira Rivera MD;  Location: [facility] OR;  Service: Robotics - DaVinci;  Laterality: Bilateral;    MYRINGOTOMY W/ TUBES Left 04/17/2017    06/10/2016    TONSILLECTOMY       TOTAL HIP ARTHROPLASTY Right 2006       Family History: His family history includes Breast cancer in his mother; Dementia in his father; Glaucoma in his father; No Known Problems in his daughter.     Social History: He  reports that he quit smoking about 9 years ago. His smoking use included cigarettes. He started smoking about 35 years ago. He has a 12.50 pack-year smoking history. He has been exposed to tobacco smoke. He has never used smokeless tobacco. He reports that he does not currently use alcohol. He reports that he does not use drugs.    Home Medications:  ALPRAZolam, Copper Gluconate, Melatonin, NIFEdipine CC, Nasal Spray, PreserVision/Lutein, acetaminophen, albuterol sulfate HFA, aspirin, atorvastatin, azelastine, carvedilol, cholestyramine, cloNIDine, clopidogrel, diphenhydrAMINE HCl (Sleep), fexofenadine, fluticasone, furosemide, hydrALAZINE, levothyroxine, magnesium chloride ER, mesalamine, montelukast, omeprazole, potassium chloride, sodium bicarbonate, valsartan, and vitamin D    Allergies:  He is allergic to ondansetron, zofran [ondansetron hcl], lortab [hydrocodone-acetaminophen], and allopurinol.       Vital Signs:   Temp:  [97.4 °F (36.3 °C)-98.5 °F (36.9 °C)] 98.5 °F (36.9 °C)  Heart Rate:  [53] 53  Resp:  [18] 18  BP: (137)/(44) 137/44  ENT Physical Exam  Constitutional  Appearance: patient appears well-developed and well-nourished,  Communication/Voice: communication appropriate for developmental age; vocal quality normal;  Head and Face  Appearance: head appears normal, face appears normal and face appears atraumatic;  Palpation: facial palpation normal;  Salivary: glands normal;  Ear  Hearing: Rinne BC > AC to the left; lateralizes left;  Auricles: right auricle normal; left auricle normal;  External Mastoids: right external mastoid normal; left external mastoid normal;  Ear Canals: bilateral ear canals normal;  Tympanic Membranes: bilateral tympanic membranes normal;  Ear comments: Both  ear canals are open. His right eardrum has a tube in acceptable position, open, looks as though it is ventilating. The left ear does not have a tube, and he has a bigger hole than he needs it is maybe 30 to 40 percent perforation, central. No drainage is present. No collection of cholesteatoma.   Nose  External Nose: nares patent bilaterally; external nose normal;  Nose comments: Mucus present. He has a deviated septum.   Oral Cavity/Oropharynx  Lips: normal;  Neck  Neck: neck normal;  Respiratory  Inspection: breathing unlabored; normal breathing rate;  Cardiovascular  Inspection: extremities are warm and well perfused; no peripheral edema present;  Neurovestibular  Mental Status: alert and oriented;  Psychiatric: mood normal; affect is appropriate;       Result Review    RESULTS REVIEW    I have reviewed the patients old records in the chart.   I have personally reviewed the patient's ct of the sinus images.    CT Sinus Without Contrast (04/28/2023 10:16)     Assessment & Plan    Diagnoses and all orders for this visit:    1. Nasal septal deviation (Primary)  Overview:  CT Sinus Without Contrast (04/28/2023 10:16)-nasal septal deviation off to the right-hand side with compensatory hypertrophy of the left inferior turbinate there is bilateral right greater than left dana bullosa cells.      2. Chronic rhinitis    3. Hypertrophy of inferior nasal turbinate    4. Perforation of left tympanic membrane      Medical and surgical options were discussed including medical and surgical options. Risks, benefits and alternatives were discussed and questions were answered. After considering the options, the patient decided to proceed with surgery.     -----SURGERY SCHEDULING:-----  Schedule septoplasty and inferior turbinate reduction    ---INFORMED CONSENT DISCUSSION:---  SEPTOPLASTY AND TURBINOPLASTY: A septoplasty and inferior turbinoplasty were recommended. The risks and benefits were explained including but not limited  to pain, bleeding, infection, risks of the general anesthesia, continued septal deviation, crusting, congestion and septal perforation. Possibilities of continued preoperative symptoms and the possible need for revision surgery and or medical therapy were discussed. Alternatives were discussed. No guarantees were made or implied. Questions were asked appropriately answered.      ---PREOPERATIVE WORKUP:---  labs/ workup per anesthesia  Preoperative blood thinners: Hold Plavix: 5 days before surgery and 5 days after surgery. ASA 2 weeks before surgery and 5 days after surgery.           Follow-up postoperatively      Nathan Chen MD  09/27/23  15:29 CDT

## 2023-10-05 DIAGNOSIS — N18.4 CKD (CHRONIC KIDNEY DISEASE) STAGE 4, GFR 15-29 ML/MIN: ICD-10-CM

## 2023-10-05 RX ORDER — FUROSEMIDE 20 MG/1
TABLET ORAL
Qty: 180 TABLET | Refills: 3 | OUTPATIENT
Start: 2023-10-05

## 2023-10-10 ENCOUNTER — ANESTHESIA EVENT (OUTPATIENT)
Dept: GASTROENTEROLOGY | Facility: HOSPITAL | Age: 73
End: 2023-10-10
Payer: MEDICARE

## 2023-10-10 ENCOUNTER — HOSPITAL ENCOUNTER (OUTPATIENT)
Facility: HOSPITAL | Age: 73
Setting detail: HOSPITAL OUTPATIENT SURGERY
Discharge: HOME | End: 2023-10-10
Attending: INTERNAL MEDICINE | Admitting: INTERNAL MEDICINE
Payer: MEDICARE

## 2023-10-10 ENCOUNTER — ANESTHESIA (OUTPATIENT)
Dept: GASTROENTEROLOGY | Facility: HOSPITAL | Age: 73
End: 2023-10-10
Payer: MEDICARE

## 2023-10-10 VITALS
HEIGHT: 72 IN | SYSTOLIC BLOOD PRESSURE: 140 MMHG | WEIGHT: 150 LBS | RESPIRATION RATE: 14 BRPM | HEART RATE: 46 BPM | BODY MASS INDEX: 20.32 KG/M2 | DIASTOLIC BLOOD PRESSURE: 45 MMHG | TEMPERATURE: 96.7 F | OXYGEN SATURATION: 99 %

## 2023-10-10 DIAGNOSIS — D64.9 ANEMIA: ICD-10-CM

## 2023-10-10 PROCEDURE — 88305 TISSUE EXAM BY PATHOLOGIST: CPT | Performed by: INTERNAL MEDICINE

## 2023-10-10 PROCEDURE — 25010000002 PROPOFOL 10 MG/ML EMULSION: Performed by: NURSE ANESTHETIST, CERTIFIED REGISTERED

## 2023-10-10 PROCEDURE — 87081 CULTURE SCREEN ONLY: CPT | Performed by: INTERNAL MEDICINE

## 2023-10-10 PROCEDURE — 25810000003 SODIUM CHLORIDE 0.9 % SOLUTION: Performed by: NURSE ANESTHETIST, CERTIFIED REGISTERED

## 2023-10-10 PROCEDURE — 88342 IMHCHEM/IMCYTCHM 1ST ANTB: CPT | Performed by: INTERNAL MEDICINE

## 2023-10-10 PROCEDURE — 88341 IMHCHEM/IMCYTCHM EA ADD ANTB: CPT | Performed by: INTERNAL MEDICINE

## 2023-10-10 RX ORDER — SODIUM CHLORIDE 0.9 % (FLUSH) 0.9 %
10 SYRINGE (ML) INJECTION AS NEEDED
Status: DISCONTINUED | OUTPATIENT
Start: 2023-10-10 | End: 2023-10-10 | Stop reason: HOSPADM

## 2023-10-10 RX ORDER — LIDOCAINE HYDROCHLORIDE 20 MG/ML
INJECTION, SOLUTION EPIDURAL; INFILTRATION; INTRACAUDAL; PERINEURAL AS NEEDED
Status: DISCONTINUED | OUTPATIENT
Start: 2023-10-10 | End: 2023-10-10 | Stop reason: SURG

## 2023-10-10 RX ORDER — PROPOFOL 10 MG/ML
VIAL (ML) INTRAVENOUS AS NEEDED
Status: DISCONTINUED | OUTPATIENT
Start: 2023-10-10 | End: 2023-10-10 | Stop reason: SURG

## 2023-10-10 RX ORDER — LIDOCAINE HYDROCHLORIDE 10 MG/ML
0.5 INJECTION, SOLUTION EPIDURAL; INFILTRATION; INTRACAUDAL; PERINEURAL ONCE AS NEEDED
Status: DISCONTINUED | OUTPATIENT
Start: 2023-10-10 | End: 2023-10-10 | Stop reason: HOSPADM

## 2023-10-10 RX ORDER — SODIUM CHLORIDE 9 MG/ML
500 INJECTION, SOLUTION INTRAVENOUS CONTINUOUS PRN
Status: DISCONTINUED | OUTPATIENT
Start: 2023-10-10 | End: 2023-10-10 | Stop reason: HOSPADM

## 2023-10-10 RX ADMIN — LIDOCAINE HYDROCHLORIDE 100 MG: 20 INJECTION, SOLUTION EPIDURAL; INFILTRATION; INTRACAUDAL; PERINEURAL at 08:13

## 2023-10-10 RX ADMIN — SODIUM CHLORIDE 500 ML: 9 INJECTION, SOLUTION INTRAVENOUS at 07:20

## 2023-10-10 RX ADMIN — PROPOFOL INJECTABLE EMULSION 150 MG: 10 INJECTION, EMULSION INTRAVENOUS at 08:13

## 2023-10-10 NOTE — H&P
[facility]  Pre Procedure History & Physical    Chief Complaint:   Anemia    Subjective     HPI:   He has chronic anemia.  Has known AVM disease.  Had ultrasound in the remote past that also suggest possible liver disease or scheduled for upper endoscopy to evaluate for anemia and possible varices.    Past Medical History:   Past Medical History:   Diagnosis Date    3-vessel CAD 8/11/2020    Allergic rhinitis     Anxiety disorder 4/27/2020    Arthritis     Asymmetrical sensorineural hearing loss 6/28/2017    Atherosclerosis of native artery of both lower extremities with intermittent claudication 7/18/2019    Avascular necrosis of femoral head, left 07/11/2020    right hip after surgery    Carotid stenosis     Chronic mucoid otitis media     Chronic rhinitis     Coronary artery disease     HEART BYPASS 2004    Crohn's disease of large intestine with other complication 7/30/2020    Chronic diarrhea Colonoscopy July 2020 revealed mild patchy scattered hemosiderin staining with inflammation more so in rectosigmoid area.  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    Iron deficiency anemia 7/14/2020    Mixed hearing loss of left ear     PAD (peripheral artery disease) 8/11/2020    Perianal abscess     Pernicious anemia 08/17/2020    took shots but never diagnosed with b12 deficiency    Personal history of alcoholism 08/11/2020    quit drinking in 2013    Prostatic hypertrophy 8/11/2020    Sensorineural hearing loss     Sepsis with acute renal failure 9/15/2020    Shortness of breath 5/27/2021    Tinnitus     Ventricular tachycardia, nonsustained 7/14/2020    Weight  loss 7/11/2020       Past Surgical History:  Past Surgical History:   Procedure Laterality Date    ARTERY SURGERY  2021    right side on neck    CAROTID ENDARTERECTOMY Right 5/10/2021    Procedure: RIGHT CAROTID ENDARTERECTOMY WITH EEG;  Surgeon: Gil Pineda DO;  Location: [facility] HYBRID OR 12;  Service: Vascular;  Laterality: Right;    COLONOSCOPY N/A 7/2/2020    Procedure: COLONOSCOPY WITH ANESTHESIA;  Surgeon: Adrien Brewster MD;  Location: [facility] ENDOSCOPY;  Service: Gastroenterology;  Laterality: N/A;  pre op: diarrhea  post op: polyps  PCP: Joe Velasco MD    COLONOSCOPY N/A 10/13/2020    Procedure: COLONOSCOPY WITH ANESTHESIA;  Surgeon: Adrien Brewster MD;  Location: [facility] ENDOSCOPY;  Service: Gastroenterology;  Laterality: N/A;  Pre: Chronic Diarrhea, Crohn's  Post: AVM  Dr. Neftali Velasco  CO2 Inflation Used    CORONARY ARTERY BYPASS GRAFT  2003    x3    ENDOSCOPY N/A 11/2/2021    Procedure: ESOPHAGOGASTRODUODENOSCOPY WITH ANESTHESIA;  Surgeon: Bridger Bell MD;  Location: [facility] ENDOSCOPY;  Service: Gastroenterology;  Laterality: N/A;  pre anemia;gi bleed  post  gi bleed;schatski ring  Dr. ERIC Velasco    EYE SURGERY Bilateral     catorac    INCISION AND DRAINAGE PERIRECTAL ABSCESS N/A 3/3/2017    Procedure: INCISION AND DRAINAGE OF JEET ANAL ABSCESS;  Surgeon: Lynette Smith MD;  Location: [facility] OR;  Service:     INGUINAL HERNIA REPAIR Bilateral 6/27/2023    Procedure: INGUINAL HERNIA BILATERAL REPAIR LAPAROSCOPIC WITH DAVINCI ROBOT WITH MESH;  Surgeon: Tahira Rivera MD;  Location: [facility] OR;  Service: Robotics - DaVinci;  Laterality: Bilateral;    MYRINGOTOMY W/ TUBES Left 04/17/2017    06/10/2016    TONSILLECTOMY      TOTAL HIP ARTHROPLASTY Right 2006        Family History:  Family History   Problem Relation Age of Onset    Breast cancer Mother     Dementia Father     Glaucoma Father     No Known Problems Daughter     Colon polyps Neg Hx     Colon cancer Neg Hx        Social  History:   reports that he quit smoking about 9 years ago. His smoking use included cigarettes. He started smoking about 35 years ago. He has a 12.50 pack-year smoking history. He has been exposed to tobacco smoke. He has never used smokeless tobacco. He reports that he does not currently use alcohol. He reports that he does not use drugs.    Medications:   Prior to Admission medications    Medication Sig Start Date End Date Taking? Authorizing Provider   albuterol sulfate  (90 Base) MCG/ACT inhaler USE 2 INHALATIONS FOUR TIMES A DAY AS NEEDED 7/12/21  Yes Tahira Mendez APRN   ALPRAZolam (XANAX) 0.25 MG tablet TAKE 1 TABLET BY MOUTH AT NIGHT AS NEEDED FOR ANXIETY 7/28/23  Yes Nathan Zimmer MD   aspirin (aspirin) 81 MG EC tablet Take  by mouth Daily.   Yes ProviderTwyla MD   atorvastatin (LIPITOR) 10 MG tablet TAKE 1 TABLET DAILY 9/18/23  Yes Eleuterio Farley MD   carvedilol (Coreg) 6.25 MG tablet Take 1 tablet by mouth 2 (Two) Times a Day With Meals. 8/3/23  Yes Nathan Zimmer MD   cholestyramine (QUESTRAN) 4 g packet Take 1 packet by mouth Daily. 9/13/22  Yes Adrien Brewster MD   cloNIDine (Catapres) 0.3 MG tablet Take 1 tablet by mouth 3 (Three) Times a Day. 8/3/23  Yes Nathan Zimmer MD   clopidogrel (PLAVIX) 75 MG tablet TAKE 1 TABLET DAILY 7/7/23  Yes Elena Jon APRN   Copper Gluconate (Copper Caps) 2 MG capsule Take  by mouth.   Yes Twyla Guevara MD   diphenhydrAMINE HCl, Sleep, (ZzzQuil) 25 MG capsule Take  by mouth Every Night.   Yes Twyla Guevara MD   fexofenadine (ALLEGRA) 180 MG tablet Take 1 tablet by mouth Daily.   Yes Twyla Guevara MD   fluticasone (FLONASE) 50 MCG/ACT nasal spray 2 sprays into the nostril(s) as directed by provider Daily As Needed for Rhinitis.   Yes Twyla Guevara MD   furosemide (LASIX) 20 MG tablet TAKE 1 TABLET TWICE A DAY 4/26/23  Yes Nathan Zimmer MD   hydrALAZINE (APRESOLINE)  100 MG tablet Take 1 tablet by mouth 3 (Three) Times a Day. 100mg daily 8/1/23  Yes Nathan Zimmer MD   levothyroxine (Synthroid) 75 MCG tablet Take 1 tablet by mouth Daily. 1/5/23  Yes Nathan Zimmer MD   magnesium chloride ER 64 MG DR tablet Take 143 mg by mouth Daily.   Yes Twyla Guevara MD   Melatonin 10 MG capsule Take 2 capsules by mouth Every Night.   Yes Twyla Guevara MD   mesalamine (APRISO) 0.375 g 24 hr capsule Take 4 capsules by mouth Daily. 9/13/22  Yes Adrien Brewster MD   montelukast (SINGULAIR) 10 MG tablet Take 1 tablet by mouth Every Night. 8/3/23  Yes Nathan Zimmer MD   Multiple Vitamins-Minerals (PRESERVISION/LUTEIN) capsule Take 1 capsule by mouth 2 (two) times a day.   Yes Twyla Guevara MD   NIFEdipine CC (ADALAT CC) 60 MG 24 hr tablet Take 1 tablet by mouth Daily. 8/3/23  Yes Nathan Zimmer MD   omeprazole (priLOSEC) 20 MG capsule TAKE 1 CAPSULE DAILY 2/8/22  Yes Tahira Mendez APRN   Oxymetazoline HCl (Nasal Spray) 0.05 % solution  8/11/22  Yes Twyla Guevara MD   potassium chloride 10 MEQ CR tablet Take 1 tablet by mouth 2 (Two) Times a Day. 2/17/23  Yes Ruthie Parra APRN   sodium bicarbonate 650 MG tablet 2 qam, 1 at noon, and 2 qpm 5/9/23  Yes Nathan Zimmer MD   valsartan (DIOVAN) 320 MG tablet Take 1 tablet by mouth Daily. 5/17/23  Yes Nathan Zimmer MD   vitamin D (ERGOCALCIFEROL) 1.25 MG (04549 UT) capsule capsule Take 1 capsule by mouth 1 (One) Time Per Week. 2/13/23  Yes Ruthie Parra APRN   acetaminophen (Tylenol) 325 MG tablet Take 3 tablets by mouth Every 8 (Eight) Hours. Take every 8 hours for 3 days then take prn as needed. 6/27/23 6/26/24  Tahira Rivera MD   azelastine (ASTELIN) 0.1 % nasal spray USE 1 SPRAY IN EACH NOSTRIL TWICE A DAY AS NEEDED 2/8/22   Tahira Mendez APRN       Allergies:  Ondansetron, Zofran [ondansetron hcl], Lortab [hydrocodone-acetaminophen],  "and Allopurinol    ROS:    General: Weight stable  Respiratory: No SOA  Cardiovascular: No CP    Objective     Blood pressure 138/50, pulse 52, temperature 96.7 øF (35.9 øC), temperature source Tympanic, resp. rate 18, height 182.9 cm (72\"), weight 68 kg (150 lb), SpO2 95%.    Physical Exam   Constitutional: Pt is oriented to person, place, and in no distress.   Cardiovascular: Normal rate, regular rhythm.    Pulmonary/Chest: Effort normal. No respiratory distress.    Abdominal: Nondistended.  Psychiatric: Mood, memory, affect and judgment appear normal.     Assessment & Plan     Diagnosis:  Anemia, question cirrhosis    Anticipated Surgical Procedure:  Endoscopy    The risks, benefits, and alternatives of this procedure have been discussed with the patient or the responsible party- the patient understands and agrees to proceed.    EMR Dragon/transcription disclaimer:  Much of this encounter note is electronic transcription/translation of spoken language to printed text.  The electronic translation of spoken language may be erroneous, or at times, nonsensical words or phrases may be inadvertently transcribed.  Although I have reviewed the note for such errors, some may still exist.  "

## 2023-10-10 NOTE — ANESTHESIA PREPROCEDURE EVALUATION
Anesthesia Evaluation     Patient summary reviewed   no history of anesthetic complications:   NPO Solid Status: > 8 hours             Airway   Mallampati: II  Dental      Pulmonary - negative pulmonary ROS   Cardiovascular   Exercise tolerance: good (4-7 METS)    (+) hypertension, CAD, CABG (2002), hyperlipidemia  (-) angina      Neuro/Psych- negative ROS  GI/Hepatic/Renal/Endo    (+) renal disease CRI, thyroid problem hypothyroidism    Musculoskeletal     Abdominal    Substance History      OB/GYN          Other                      Anesthesia Plan    ASA 3     MAC       Anesthetic plan, risks, benefits, and alternatives have been provided, discussed and informed consent has been obtained with: patient.    CODE STATUS:

## 2023-10-10 NOTE — ANESTHESIA POSTPROCEDURE EVALUATION
Patient: Ricardo Hugo    Procedure Summary       Date: 10/10/23 Room / Location: [facility] ENDOSCOPY 4 / [facility] ENDOSCOPY    Anesthesia Start: 0808 Anesthesia Stop: 0824    Procedure: ESOPHAGOGASTRODUODENOSCOPY WITH ANESTHESIA Diagnosis:       Anemia      (Anemia [D64.9])    Surgeons: Adrien Brewster MD Provider: Gary Ferrer CRNA    Anesthesia Type: MAC ASA Status: 3            Anesthesia Type: MAC    Vitals  Vitals Value Taken Time   BP     Temp     Pulse 43 10/10/23 0824   Resp     SpO2 96 % 10/10/23 0824   Vitals shown include unfiled device data.        Post Anesthesia Care and Evaluation    Patient location during evaluation: PHASE II  Patient participation: complete - patient participated  Level of consciousness: awake  Pain management: adequate    Airway patency: patent  Anesthetic complications: No anesthetic complications  Respiratory status: acceptable  Hydration status: acceptable

## 2023-10-11 ENCOUNTER — LAB (OUTPATIENT)
Dept: LAB | Facility: HOSPITAL | Age: 73
End: 2023-10-11
Payer: MEDICARE

## 2023-10-11 ENCOUNTER — INFUSION (OUTPATIENT)
Dept: ONCOLOGY | Facility: HOSPITAL | Age: 73
End: 2023-10-11
Payer: MEDICARE

## 2023-10-11 VITALS
SYSTOLIC BLOOD PRESSURE: 156 MMHG | BODY MASS INDEX: 20.86 KG/M2 | TEMPERATURE: 97.6 F | DIASTOLIC BLOOD PRESSURE: 46 MMHG | WEIGHT: 154 LBS | HEIGHT: 72 IN | RESPIRATION RATE: 18 BRPM | HEART RATE: 54 BPM

## 2023-10-11 DIAGNOSIS — N18.4 ANEMIA DUE TO STAGE 4 CHRONIC KIDNEY DISEASE: ICD-10-CM

## 2023-10-11 DIAGNOSIS — D63.1 ANEMIA DUE TO STAGE 4 CHRONIC KIDNEY DISEASE: ICD-10-CM

## 2023-10-11 DIAGNOSIS — D64.9 SYMPTOMATIC ANEMIA: Primary | ICD-10-CM

## 2023-10-11 LAB
DEPRECATED RDW RBC AUTO: 53.1 FL (ref 37–54)
ERYTHROCYTE [DISTWIDTH] IN BLOOD BY AUTOMATED COUNT: 14.2 % (ref 12.3–15.4)
HCT VFR BLD AUTO: 36 % (ref 37.5–51)
HGB BLD-MCNC: 11.2 G/DL (ref 13–17.7)
MCH RBC QN AUTO: 31.5 PG (ref 26.6–33)
MCHC RBC AUTO-ENTMCNC: 31.1 G/DL (ref 31.5–35.7)
MCV RBC AUTO: 101.1 FL (ref 79–97)
PLATELET # BLD AUTO: 101 10*3/MM3 (ref 140–450)
PMV BLD AUTO: 9.9 FL (ref 6–12)
RBC # BLD AUTO: 3.56 10*6/MM3 (ref 4.14–5.8)
UREASE TISS QL: NEGATIVE
WBC NRBC COR # BLD: 5.15 10*3/MM3 (ref 3.4–10.8)

## 2023-10-11 PROCEDURE — 85027 COMPLETE CBC AUTOMATED: CPT

## 2023-10-11 PROCEDURE — 36415 COLL VENOUS BLD VENIPUNCTURE: CPT

## 2023-10-11 PROCEDURE — G0463 HOSPITAL OUTPT CLINIC VISIT: HCPCS

## 2023-10-12 LAB
LAB AP CASE REPORT: NORMAL
LAB AP DIAGNOSIS COMMENT: NORMAL
Lab: NORMAL
PATH REPORT.FINAL DX SPEC: NORMAL
PATH REPORT.GROSS SPEC: NORMAL

## 2023-10-23 RX ORDER — CHOLESTYRAMINE 4 G/9G
POWDER, FOR SUSPENSION ORAL
Qty: 90 PACKET | Refills: 3 | Status: SHIPPED | OUTPATIENT
Start: 2023-10-23

## 2023-10-25 ENCOUNTER — INFUSION (OUTPATIENT)
Dept: ONCOLOGY | Facility: HOSPITAL | Age: 73
End: 2023-10-25
Payer: MEDICARE

## 2023-10-25 ENCOUNTER — LAB (OUTPATIENT)
Dept: LAB | Facility: HOSPITAL | Age: 73
End: 2023-10-25
Payer: MEDICARE

## 2023-10-25 VITALS
SYSTOLIC BLOOD PRESSURE: 148 MMHG | WEIGHT: 154 LBS | RESPIRATION RATE: 18 BRPM | HEIGHT: 72 IN | DIASTOLIC BLOOD PRESSURE: 45 MMHG | HEART RATE: 57 BPM | BODY MASS INDEX: 20.86 KG/M2 | TEMPERATURE: 97.9 F

## 2023-10-25 DIAGNOSIS — D63.1 ANEMIA DUE TO STAGE 4 CHRONIC KIDNEY DISEASE: Primary | ICD-10-CM

## 2023-10-25 DIAGNOSIS — N18.4 ANEMIA DUE TO STAGE 4 CHRONIC KIDNEY DISEASE: Primary | ICD-10-CM

## 2023-10-25 DIAGNOSIS — N18.4 ANEMIA DUE TO STAGE 4 CHRONIC KIDNEY DISEASE: ICD-10-CM

## 2023-10-25 DIAGNOSIS — D63.1 ANEMIA DUE TO STAGE 4 CHRONIC KIDNEY DISEASE: ICD-10-CM

## 2023-10-25 LAB
DEPRECATED RDW RBC AUTO: 49.4 FL (ref 37–54)
ERYTHROCYTE [DISTWIDTH] IN BLOOD BY AUTOMATED COUNT: 13.8 % (ref 12.3–15.4)
HCT VFR BLD AUTO: 30.9 % (ref 37.5–51)
HGB BLD-MCNC: 9.7 G/DL (ref 13–17.7)
MCH RBC QN AUTO: 30.8 PG (ref 26.6–33)
MCHC RBC AUTO-ENTMCNC: 31.4 G/DL (ref 31.5–35.7)
MCV RBC AUTO: 98.1 FL (ref 79–97)
PLATELET # BLD AUTO: 108 10*3/MM3 (ref 140–450)
PMV BLD AUTO: 10 FL (ref 6–12)
RBC # BLD AUTO: 3.15 10*6/MM3 (ref 4.14–5.8)
WBC NRBC COR # BLD: 5.42 10*3/MM3 (ref 3.4–10.8)

## 2023-10-25 PROCEDURE — 25010000002 EPOETIN ALFA PER 1000 UNITS: Performed by: NURSE PRACTITIONER

## 2023-10-25 PROCEDURE — 85027 COMPLETE CBC AUTOMATED: CPT

## 2023-10-25 PROCEDURE — 36415 COLL VENOUS BLD VENIPUNCTURE: CPT

## 2023-10-25 PROCEDURE — 96372 THER/PROPH/DIAG INJ SC/IM: CPT

## 2023-10-25 RX ADMIN — ERYTHROPOIETIN 40000 UNITS: 40000 INJECTION, SOLUTION INTRAVENOUS; SUBCUTANEOUS at 09:08

## 2023-10-30 DIAGNOSIS — F41.9 ANXIETY: ICD-10-CM

## 2023-10-30 RX ORDER — ALPRAZOLAM 0.25 MG/1
0.25 TABLET ORAL NIGHTLY PRN
Qty: 90 TABLET | Refills: 1 | Status: SHIPPED | OUTPATIENT
Start: 2023-10-30

## 2023-10-30 NOTE — TELEPHONE ENCOUNTER
Last OV 8/1 w/Zimmer  Next OV 11/2 w/Zimmer  UTD on CSA, no UDS on file, so will collect at upcoming visit.

## 2023-11-02 ENCOUNTER — OFFICE VISIT (OUTPATIENT)
Dept: INTERNAL MEDICINE | Facility: CLINIC | Age: 73
End: 2023-11-02
Payer: MEDICARE

## 2023-11-02 VITALS
TEMPERATURE: 97.8 F | OXYGEN SATURATION: 99 % | DIASTOLIC BLOOD PRESSURE: 54 MMHG | HEIGHT: 72 IN | HEART RATE: 61 BPM | BODY MASS INDEX: 20.99 KG/M2 | WEIGHT: 155 LBS | SYSTOLIC BLOOD PRESSURE: 180 MMHG

## 2023-11-02 DIAGNOSIS — I1A.0 RESISTANT HYPERTENSION: ICD-10-CM

## 2023-11-02 DIAGNOSIS — N18.4 CRD (CHRONIC RENAL DISEASE), STAGE IV: ICD-10-CM

## 2023-11-02 DIAGNOSIS — Z79.899 ENCOUNTER FOR LONG-TERM CURRENT USE OF MEDICATION: Primary | ICD-10-CM

## 2023-11-02 RX ORDER — NIFEDIPINE 90 MG/1
90 TABLET, FILM COATED, EXTENDED RELEASE ORAL DAILY
Qty: 90 TABLET | Refills: 3 | Status: SHIPPED | OUTPATIENT
Start: 2023-11-02

## 2023-11-04 NOTE — PROGRESS NOTES
"      Chief Complaint  Hypertension (3 month follow up /Flu vac completed ) and pneumo vaccine     Subjective        Ricardo Hugo presents to [facility]    HPI  Patient here for the above problems.  See Assessment and Plan for further HPI components.        Review of Systems    Objective   Vital Signs:  /54 (BP Location: Left arm, Patient Position: Sitting, Cuff Size: Adult)   Pulse 61   Temp 97.8 °F (36.6 °C) (Temporal)   Ht 182.9 cm (72\")   Wt 70.3 kg (155 lb)   SpO2 99%   BMI 21.02 kg/m²   Estimated body mass index is 21.02 kg/m² as calculated from the following:    Height as of this encounter: 182.9 cm (72\").    Weight as of this encounter: 70.3 kg (155 lb).      Physical Exam  Vitals reviewed.   Constitutional:       Appearance: He is not ill-appearing.      Comments: Hard of hearing   Cardiovascular:      Rate and Rhythm: Normal rate and regular rhythm.      Heart sounds: Murmur heard.   Skin:     General: Skin is warm.   Neurological:      General: No focal deficit present.      Mental Status: He is alert and oriented to person, place, and time.   Psychiatric:         Mood and Affect: Mood normal.         Behavior: Behavior normal.                         Assessment and Plan   Diagnoses and all orders for this visit:    1. Encounter for long-term current use of medication (Primary)  -     ToxASSURE Select 13 (MW) - Urine, Clean Catch    2. Resistant hypertension  -     NIFEdipine CC (ADALAT CC) 90 MG 24 hr tablet; Take 1 tablet by mouth Daily.  Dispense: 90 tablet; Refill: 3    3. CRD (chronic renal disease), stage IV      I gave patient a lab slip of the items I wanted to make sure were drawn.  He is going to take that with him to get labs for nephrology that way we are able to avoid duplicates.  I am wanting CMP, Magnesium and urine microalbumin/creatinine ratio.  Would also like a CBC if they can get as well.  Many of these may be labs already being considered " by nephrology (CMP obviously will be).      Patients BP log reviewed.  He does an excellent job on monitoring his BP at home.  Our numbers are more consistent but still not getting to goal.  His ideal goal would be < 130/80 if we are able to get there.  We have been able to reach target some but not able to maintain it consistently.  Will go up on the nifedipine to 90 mg.      Patient has CKD Stage 4.  He is on an SGLT2 even though he was borderline eGFR to start it.  He has maintained stability.  Hopefully will slow progression of renal disease.  We are hoping to avoid dialysis for the remained of the patients life if able.  Continue to monitor closely.  Conitnue farxiga.      Result Review :  The following data was reviewed by: Nathan Zimmer MD on 11/02/2023:  CMP          8/1/2023    10:19 8/3/2023    08:29 9/13/2023    08:13   CMP   Glucose 111  134  124    BUN 46  47  64    Creatinine 2.85  3.19  3.45    EGFR  19.5  17.8    Sodium 139  141  138    Potassium 4.4  4.5  5.0    Chloride 104  108  106    Calcium 8.9  9.0  8.6    Total Protein  6.2     Albumin  3.8     Globulin  2.4     Total Bilirubin  0.4     Alkaline Phosphatase  137     AST (SGOT)  13     ALT (SGPT)  8     Albumin/Globulin Ratio  1.6     BUN/Creatinine Ratio 16.1  14.7  18.6    Anion Gap  12.0  15.0      Renal Profile          8/1/2023    10:19 8/3/2023    08:29 9/13/2023    08:13   Renal Profile   BUN 46  47  64    Creatinine 2.85  3.19  3.45      BMP          8/1/2023    10:19 8/3/2023    08:29 9/13/2023    08:13   BMP   BUN 46  47  64    Creatinine 2.85  3.19  3.45    Sodium 139  141  138    Potassium 4.4  4.5  5.0    Chloride 104  108  106    CO2 21.7  21.0  17.0    Calcium 8.9  9.0  8.6           BMI is within normal parameters. No other follow-up for BMI required.      BMI is within normal parameters. No other follow-up for BMI required.            Follow Up   No follow-ups on file.  Patient was given instructions and counseling  regarding his condition or for health maintenance advice. Please see specific information pulled into the AVS if appropriate.       MAHENDRA Zimmer MD, FACP, FHM      Electronically signed by Nathan Zimmer MD, 11/04/23, 3:42 PM CDT.

## 2023-11-06 ENCOUNTER — TELEPHONE (OUTPATIENT)
Dept: VASCULAR SURGERY | Facility: CLINIC | Age: 73
End: 2023-11-06
Payer: MEDICARE

## 2023-11-06 NOTE — TELEPHONE ENCOUNTER
Call placed to patient and reminded patient of appointments on 11/7/2023, patient vopiced understanding

## 2023-11-07 ENCOUNTER — HOSPITAL ENCOUNTER (OUTPATIENT)
Dept: ULTRASOUND IMAGING | Facility: HOSPITAL | Age: 73
Discharge: HOME | End: 2023-11-07
Payer: MEDICARE

## 2023-11-07 ENCOUNTER — TRANSCRIBE ORDERS (OUTPATIENT)
Dept: ADMINISTRATIVE | Facility: HOSPITAL | Age: 73
End: 2023-11-07
Payer: MEDICARE

## 2023-11-07 ENCOUNTER — OFFICE VISIT (OUTPATIENT)
Dept: VASCULAR SURGERY | Facility: CLINIC | Age: 73
End: 2023-11-07
Payer: MEDICARE

## 2023-11-07 VITALS
HEIGHT: 72 IN | DIASTOLIC BLOOD PRESSURE: 78 MMHG | WEIGHT: 155.4 LBS | BODY MASS INDEX: 21.05 KG/M2 | OXYGEN SATURATION: 94 % | SYSTOLIC BLOOD PRESSURE: 142 MMHG | HEART RATE: 62 BPM

## 2023-11-07 DIAGNOSIS — I10 ESSENTIAL HYPERTENSION: ICD-10-CM

## 2023-11-07 DIAGNOSIS — I1A.0 RESISTANT HYPERTENSION: ICD-10-CM

## 2023-11-07 DIAGNOSIS — I65.23 BILATERAL CAROTID ARTERY STENOSIS: ICD-10-CM

## 2023-11-07 DIAGNOSIS — E78.2 MIXED HYPERLIPIDEMIA: ICD-10-CM

## 2023-11-07 DIAGNOSIS — N18.4 CHRONIC KIDNEY DISEASE, STAGE IV (SEVERE): Primary | ICD-10-CM

## 2023-11-07 DIAGNOSIS — I10 ESSENTIAL HYPERTENSION, MALIGNANT: ICD-10-CM

## 2023-11-07 DIAGNOSIS — I73.9 PAD (PERIPHERAL ARTERY DISEASE): ICD-10-CM

## 2023-11-07 DIAGNOSIS — I65.23 BILATERAL CAROTID ARTERY STENOSIS: Primary | ICD-10-CM

## 2023-11-07 PROCEDURE — 3078F DIAST BP <80 MM HG: CPT | Performed by: NURSE PRACTITIONER

## 2023-11-07 PROCEDURE — 3077F SYST BP >= 140 MM HG: CPT | Performed by: NURSE PRACTITIONER

## 2023-11-07 PROCEDURE — 93880 EXTRACRANIAL BILAT STUDY: CPT | Performed by: SURGERY

## 2023-11-07 PROCEDURE — 99214 OFFICE O/P EST MOD 30 MIN: CPT | Performed by: NURSE PRACTITIONER

## 2023-11-07 PROCEDURE — 1160F RVW MEDS BY RX/DR IN RCRD: CPT | Performed by: NURSE PRACTITIONER

## 2023-11-07 PROCEDURE — 93880 EXTRACRANIAL BILAT STUDY: CPT

## 2023-11-07 PROCEDURE — 1159F MED LIST DOCD IN RCRD: CPT | Performed by: NURSE PRACTITIONER

## 2023-11-07 PROCEDURE — 93923 UPR/LXTR ART STDY 3+ LVLS: CPT | Performed by: SURGERY

## 2023-11-07 PROCEDURE — 93923 UPR/LXTR ART STDY 3+ LVLS: CPT

## 2023-11-07 RX ORDER — NIFEDIPINE 90 MG/1
90 TABLET, FILM COATED, EXTENDED RELEASE ORAL DAILY
Qty: 90 TABLET | Refills: 3 | Status: SHIPPED | OUTPATIENT
Start: 2023-11-07

## 2023-11-07 NOTE — LETTER
November 7, 2023       No Recipients    Patient: Ricardo Hugo   YOB: 1950   Date of Visit: 11/7/2023     Dear Nathan Zimmer MD:       Thank you for referring Ricardo Hugo to me for evaluation. Below are the relevant portions of my assessment and plan of care.    If you have questions, please do not hesitate to call me. I look forward to following Ricardo along with you.         Sincerely,        Gil Pineda DO        CC:   No Recipients    Gil Pineda DO  11/07/23 1421  Sign when Signing Visit  11/7/2023       Nathan Zimmer MD  [address] [place] [address]    Ricardo Hugo  1950    Chief Complaint   Patient presents with   • bilateral carotid artery stenosis     No complaints and no stroke symptoms       Dear Nathan Zimmer MD       HPI  I had the pleasure of seeing your patient Ricardo Hugo in the office today.  As you recall, Ricardo Hugo is a 73 y.o.  male who we are following for lower extremity PAD and carotid occlusive disease.   He was found to have significant carotid disease and underwent a right carotid endarterectomy on 5/10/2021.  Currently he is doing well and denies any strokelike symptoms.  Currently he is doing well and denies any strokelike symptoms.  He also denies any changes to his lower extremities.  He is maintained on aspirin, Plavix, and Lipitor.  He did have noninvasive testing performed today, which I did review in office.      Review of Systems   Constitutional: Negative.    HENT: Negative.     Eyes: Negative.    Respiratory: Negative.     Cardiovascular: Negative.    Gastrointestinal: Negative.    Endocrine: Negative.    Genitourinary: Negative.    Musculoskeletal: Negative.    Skin: Negative.    Allergic/Immunologic: Negative.    Neurological: Negative.    Hematological: Negative.    Psychiatric/Behavioral: Negative.     All other systems reviewed and are negative.       /78   Pulse 62   Ht 182.9 cm  "(72\")   Wt 70.5 kg (155 lb 6.4 oz)   SpO2 94%   BMI 21.08 kg/m²    Physical Exam  Vitals and nursing note reviewed.   Constitutional:       Appearance: Normal appearance. He is well-developed and normal weight.   HENT:      Head: Normocephalic and atraumatic.   Eyes:      General: No scleral icterus.     Pupils: Pupils are equal, round, and reactive to light.   Neck:      Thyroid: No thyromegaly.   Cardiovascular:      Rate and Rhythm: Normal rate and regular rhythm.      Pulses:           Femoral pulses are 2+ on the right side and 2+ on the left side.       Dorsalis pedis pulses are detected w/ Doppler on the right side and detected w/ Doppler on the left side.        Posterior tibial pulses are detected w/ Doppler on the right side and detected w/ Doppler on the left side.      Heart sounds: Normal heart sounds.   Pulmonary:      Effort: Pulmonary effort is normal.      Breath sounds: Normal breath sounds.   Abdominal:      General: Bowel sounds are normal.      Palpations: Abdomen is soft.   Musculoskeletal:         General: Normal range of motion.      Cervical back: Normal range of motion and neck supple.   Skin:     General: Skin is warm and dry.   Neurological:      Mental Status: He is alert and oriented to person, place, and time.   Psychiatric:         Mood and Affect: Mood normal.         Behavior: Behavior normal.         Thought Content: Thought content normal.         Judgment: Judgment normal.           Diagnostic data:  US Carotid Bilateral    Result Date: 11/7/2023  Narrative: History: Carotid occlusive disease      Impression: Impression: 1. There is less than 50% stenosis of the right internal carotid artery. 2. There is 50 to 69% stenosis of the left internal carotid artery. 3. Antegrade flow is demonstrated in bilateral vertebral arteries. 4. There is heavy plaque burden at both bifurcations. Further imaging may be warranted.  Comments: Bilateral carotid vertebral arterial duplex scan was " performed.  Grayscale imaging shows intimal thickening and calcified elements at the carotid bifurcation. The right internal carotid artery peak systolic velocity is 124.2 cm/sec. The end-diastolic velocity is 23.5 cm/sec. The right ICA/CCA ratio is approximately 1.3. These findings correlate with less than 50% stenosis of the right internal carotid artery.  Grayscale imaging shows intimal thickening and calcified elements at the carotid bifurcation. The left internal carotid artery peak systolic velocity is 161.3 cm/sec. The end-diastolic velocity is 34.9 cm/sec. The left ICA/CCA ratio is approximately 1.6. These findings correlate with 50 to 69% stenosis of the left internal carotid artery.  Antegrade flow is demonstrated in bilateral vertebral arteries. There is greater than 50% stenosis in bilateral external carotid arteries.  This report was signed and finalized on 11/7/2023 1:07 PM CST by Dr. Gil Pineda MD.      US Ankle / Brachial Indices Extremity Complete    Result Date: 11/7/2023  Narrative:  History: PAD  Comments: Bilateral lower extremity arterial with multi-level pulse volume recordings and segmental pressures were performed at rest and stress.  The right ankle/brachial index is 0.7. The waveforms are biphasic with mild dampening. These findings are consistent with moderate arterial insufficiency of the right lower extremity at rest.  The left ankle/brachial index is 0.59. The waveforms are biphasic with mild dampening. These findings are consistent with moderate arterial insufficiency of the left lower extremity at rest.      Impression: Impression: 1. Moderate arterial insufficiency of the right lower extremity at rest. 2. Moderate arterial insufficiency of the left lower extremity at rest.    This report was signed and finalized on 11/7/2023 12:56 PM CST by Dr. Gil Pineda MD.          Patient Active Problem List   Diagnosis   • Chronic rhinitis   • Resistant hypertension   • Chronic  diarrhea   • Symptomatic anemia   • Wellness examination   • PAD (peripheral artery disease)   • IHD (ischemic heart disease)   • Carotid stenosis   • Stenosis of right carotid artery   • Carotid stenosis, right   • Diastolic dysfunction   • CRD (chronic renal disease), stage IV   • Anemia due to chronic kidney disease   • Copper deficiency   • Low iron    • CLL (chronic lymphocytic leukemia)   • Perforation of left tympanic membrane   • Mixed hyperlipidemia   • Systolic murmur   • Eustachian tube dysfunction, bilateral   • Sensorineural hearing loss (SNHL), bilateral   • Anticoagulated   • Nasal septal deviation   • Hypertrophy of inferior nasal turbinate   • Unilateral recurrent inguinal hernia without obstruction or gangrene   • Bilateral inguinal hernia without obstruction or gangrene   • Sleep difficulties         ICD-10-CM ICD-9-CM   1. Bilateral carotid artery stenosis  I65.23 433.10     433.30   2. PAD (peripheral artery disease)  I73.9 443.9   3. Mixed hyperlipidemia  E78.2 272.2   4. Essential hypertension  I10 401.9         Plan: After thoroughly evaluating Ricardo Hugo, I believe the best course of action is to remain conservative from a vascular surgery standpoint.  I did review his testing which shows less than 50% right carotid stenosis and 50 to 69% left carotid stenosis.  ABIs show moderate arterial insufficiency to bilateral lower extremities.  Currently he is doing well and denies any changes to his lower extremities.  I will see him back in 1 year with repeat noninvasive testing for continued surveillance including a carotid duplex and ABIs. I did discuss vascular risk factors as they pertain to the progression of vascular disease including controlling his hypertension and hyperlipidemia.  His blood pressure slightly elevated at 142/78.  He should continue on his aspirin 81 mg daily, Plavix 75 mg daily, and Lipitor 10 mg daily in addition to his other medications.  This was all discussed in  full with complete understanding.    Thank you for allowing me to participate in the care of your patient.  Please do not hesitate with any questions or concerns.  I will keep you aware of any further encounters with Ricardo Hugo.        Sincerely yours,         STERLING Murray

## 2023-11-07 NOTE — PROGRESS NOTES
"11/7/2023       Nathan Zimmer MD  [address] [place] [address]    Ricardo Hugo  1950    Chief Complaint   Patient presents with    bilateral carotid artery stenosis     No complaints and no stroke symptoms       Dear Nathan Zimmer MD       HPI  I had the pleasure of seeing your patient Ricardo Hugo in the office today.  As you recall, Ricardo Hugo is a 73 y.o.  male who we are following for lower extremity PAD and carotid occlusive disease.   He was found to have significant carotid disease and underwent a right carotid endarterectomy on 5/10/2021.  Currently he is doing well and denies any strokelike symptoms.  Currently he is doing well and denies any strokelike symptoms.  He also denies any changes to his lower extremities.  He is maintained on aspirin, Plavix, and Lipitor.  He did have noninvasive testing performed today, which I did review in office.      Review of Systems   Constitutional: Negative.    HENT: Negative.     Eyes: Negative.    Respiratory: Negative.     Cardiovascular: Negative.    Gastrointestinal: Negative.    Endocrine: Negative.    Genitourinary: Negative.    Musculoskeletal: Negative.    Skin: Negative.    Allergic/Immunologic: Negative.    Neurological: Negative.    Hematological: Negative.    Psychiatric/Behavioral: Negative.     All other systems reviewed and are negative.       /78   Pulse 62   Ht 182.9 cm (72\")   Wt 70.5 kg (155 lb 6.4 oz)   SpO2 94%   BMI 21.08 kg/m²    Physical Exam  Vitals and nursing note reviewed.   Constitutional:       Appearance: Normal appearance. He is well-developed and normal weight.   HENT:      Head: Normocephalic and atraumatic.   Eyes:      General: No scleral icterus.     Pupils: Pupils are equal, round, and reactive to light.   Neck:      Thyroid: No thyromegaly.   Cardiovascular:      Rate and Rhythm: Normal rate and regular rhythm.      Pulses:           Femoral pulses are 2+ on the right side and " 2+ on the left side.       Dorsalis pedis pulses are detected w/ Doppler on the right side and detected w/ Doppler on the left side.        Posterior tibial pulses are detected w/ Doppler on the right side and detected w/ Doppler on the left side.      Heart sounds: Normal heart sounds.   Pulmonary:      Effort: Pulmonary effort is normal.      Breath sounds: Normal breath sounds.   Abdominal:      General: Bowel sounds are normal.      Palpations: Abdomen is soft.   Musculoskeletal:         General: Normal range of motion.      Cervical back: Normal range of motion and neck supple.   Skin:     General: Skin is warm and dry.   Neurological:      Mental Status: He is alert and oriented to person, place, and time.   Psychiatric:         Mood and Affect: Mood normal.         Behavior: Behavior normal.         Thought Content: Thought content normal.         Judgment: Judgment normal.           Diagnostic data:  US Carotid Bilateral    Result Date: 11/7/2023  Narrative: History: Carotid occlusive disease      Impression: Impression: 1. There is less than 50% stenosis of the right internal carotid artery. 2. There is 50 to 69% stenosis of the left internal carotid artery. 3. Antegrade flow is demonstrated in bilateral vertebral arteries. 4. There is heavy plaque burden at both bifurcations. Further imaging may be warranted.  Comments: Bilateral carotid vertebral arterial duplex scan was performed.  Grayscale imaging shows intimal thickening and calcified elements at the carotid bifurcation. The right internal carotid artery peak systolic velocity is 124.2 cm/sec. The end-diastolic velocity is 23.5 cm/sec. The right ICA/CCA ratio is approximately 1.3. These findings correlate with less than 50% stenosis of the right internal carotid artery.  Grayscale imaging shows intimal thickening and calcified elements at the carotid bifurcation. The left internal carotid artery peak systolic velocity is 161.3 cm/sec. The  end-diastolic velocity is 34.9 cm/sec. The left ICA/CCA ratio is approximately 1.6. These findings correlate with 50 to 69% stenosis of the left internal carotid artery.  Antegrade flow is demonstrated in bilateral vertebral arteries. There is greater than 50% stenosis in bilateral external carotid arteries.  This report was signed and finalized on 11/7/2023 1:07 PM CST by Dr. Gil Pineda MD.      US Ankle / Brachial Indices Extremity Complete    Result Date: 11/7/2023  Narrative:  History: PAD  Comments: Bilateral lower extremity arterial with multi-level pulse volume recordings and segmental pressures were performed at rest and stress.  The right ankle/brachial index is 0.7. The waveforms are biphasic with mild dampening. These findings are consistent with moderate arterial insufficiency of the right lower extremity at rest.  The left ankle/brachial index is 0.59. The waveforms are biphasic with mild dampening. These findings are consistent with moderate arterial insufficiency of the left lower extremity at rest.      Impression: Impression: 1. Moderate arterial insufficiency of the right lower extremity at rest. 2. Moderate arterial insufficiency of the left lower extremity at rest.    This report was signed and finalized on 11/7/2023 12:56 PM CST by Dr. Gil Pineda MD.          Patient Active Problem List   Diagnosis    Chronic rhinitis    Resistant hypertension    Chronic diarrhea    Symptomatic anemia    Wellness examination    PAD (peripheral artery disease)    IHD (ischemic heart disease)    Carotid stenosis    Stenosis of right carotid artery    Carotid stenosis, right    Diastolic dysfunction    CRD (chronic renal disease), stage IV    Anemia due to chronic kidney disease    Copper deficiency    Low iron     CLL (chronic lymphocytic leukemia)    Perforation of left tympanic membrane    Mixed hyperlipidemia    Systolic murmur    Eustachian tube dysfunction, bilateral    Sensorineural hearing loss  (SNHL), bilateral    Anticoagulated    Nasal septal deviation    Hypertrophy of inferior nasal turbinate    Unilateral recurrent inguinal hernia without obstruction or gangrene    Bilateral inguinal hernia without obstruction or gangrene    Sleep difficulties         ICD-10-CM ICD-9-CM   1. Bilateral carotid artery stenosis  I65.23 433.10     433.30   2. PAD (peripheral artery disease)  I73.9 443.9   3. Mixed hyperlipidemia  E78.2 272.2   4. Essential hypertension  I10 401.9         Plan: After thoroughly evaluating Ricardo Hugo, I believe the best course of action is to remain conservative from a vascular surgery standpoint.  I did review his testing which shows less than 50% right carotid stenosis and 50 to 69% left carotid stenosis.  ABIs show moderate arterial insufficiency to bilateral lower extremities.  Currently he is doing well and denies any changes to his lower extremities.  I will see him back in 1 year with repeat noninvasive testing for continued surveillance including a carotid duplex and ABIs. I did discuss vascular risk factors as they pertain to the progression of vascular disease including controlling his hypertension and hyperlipidemia.  His blood pressure slightly elevated at 142/78.  He should continue on his aspirin 81 mg daily, Plavix 75 mg daily, and Lipitor 10 mg daily in addition to his other medications.  This was all discussed in full with complete understanding.    Thank you for allowing me to participate in the care of your patient.  Please do not hesitate with any questions or concerns.  I will keep you aware of any further encounters with Ricardo Hugo.        Sincerely yours,         STERLING Murray

## 2023-11-08 ENCOUNTER — INFUSION (OUTPATIENT)
Dept: ONCOLOGY | Facility: HOSPITAL | Age: 73
End: 2023-11-08
Payer: MEDICARE

## 2023-11-08 ENCOUNTER — LAB (OUTPATIENT)
Dept: LAB | Facility: HOSPITAL | Age: 73
End: 2023-11-08
Payer: MEDICARE

## 2023-11-08 VITALS
HEIGHT: 72 IN | SYSTOLIC BLOOD PRESSURE: 147 MMHG | TEMPERATURE: 97.8 F | RESPIRATION RATE: 14 BRPM | DIASTOLIC BLOOD PRESSURE: 47 MMHG | OXYGEN SATURATION: 96 % | BODY MASS INDEX: 21.26 KG/M2 | WEIGHT: 157 LBS | HEART RATE: 52 BPM

## 2023-11-08 DIAGNOSIS — N18.4 ANEMIA DUE TO STAGE 4 CHRONIC KIDNEY DISEASE: Primary | ICD-10-CM

## 2023-11-08 DIAGNOSIS — D63.1 ANEMIA DUE TO STAGE 4 CHRONIC KIDNEY DISEASE: Primary | ICD-10-CM

## 2023-11-08 DIAGNOSIS — N18.4 CHRONIC KIDNEY DISEASE, STAGE IV (SEVERE): ICD-10-CM

## 2023-11-08 DIAGNOSIS — D63.1 ANEMIA DUE TO STAGE 4 CHRONIC KIDNEY DISEASE: ICD-10-CM

## 2023-11-08 DIAGNOSIS — I10 ESSENTIAL HYPERTENSION, MALIGNANT: ICD-10-CM

## 2023-11-08 DIAGNOSIS — N18.4 ANEMIA DUE TO STAGE 4 CHRONIC KIDNEY DISEASE: ICD-10-CM

## 2023-11-08 LAB
ALBUMIN SERPL-MCNC: 3.8 G/DL (ref 3.5–5.2)
ALBUMIN/GLOB SERPL: 1.5 G/DL
ALP SERPL-CCNC: 151 U/L (ref 39–117)
ALT SERPL W P-5'-P-CCNC: 11 U/L (ref 1–41)
ANION GAP SERPL CALCULATED.3IONS-SCNC: 15 MMOL/L (ref 5–15)
AST SERPL-CCNC: 18 U/L (ref 1–40)
BACTERIA UR QL AUTO: NORMAL /HPF
BASOPHILS # BLD AUTO: 0.04 10*3/MM3 (ref 0–0.2)
BASOPHILS NFR BLD AUTO: 0.7 % (ref 0–1.5)
BILIRUB SERPL-MCNC: 0.4 MG/DL (ref 0–1.2)
BILIRUB UR QL STRIP: NEGATIVE
BUN SERPL-MCNC: 53 MG/DL (ref 8–23)
BUN/CREAT SERPL: 16.7 (ref 7–25)
CALCIUM SPEC-SCNC: 8.7 MG/DL (ref 8.6–10.5)
CHLORIDE SERPL-SCNC: 104 MMOL/L (ref 98–107)
CLARITY UR: CLEAR
CO2 SERPL-SCNC: 17 MMOL/L (ref 22–29)
COLOR UR: YELLOW
CREAT SERPL-MCNC: 3.17 MG/DL (ref 0.76–1.27)
CREAT UR-MCNC: 52.2 MG/DL
DEPRECATED RDW RBC AUTO: 53.8 FL (ref 37–54)
EGFRCR SERPLBLD CKD-EPI 2021: 19.7 ML/MIN/1.73
EOSINOPHIL # BLD AUTO: 0.15 10*3/MM3 (ref 0–0.4)
EOSINOPHIL NFR BLD AUTO: 2.6 % (ref 0.3–6.2)
ERYTHROCYTE [DISTWIDTH] IN BLOOD BY AUTOMATED COUNT: 15 % (ref 12.3–15.4)
FERRITIN SERPL-MCNC: 256.6 NG/ML (ref 30–400)
GLOBULIN UR ELPH-MCNC: 2.6 GM/DL
GLUCOSE SERPL-MCNC: 109 MG/DL (ref 65–99)
GLUCOSE UR STRIP-MCNC: NEGATIVE MG/DL
HCT VFR BLD AUTO: 30.3 % (ref 37.5–51)
HGB BLD-MCNC: 9.3 G/DL (ref 13–17.7)
HGB UR QL STRIP.AUTO: NEGATIVE
HYALINE CASTS UR QL AUTO: NORMAL /LPF
IMM GRANULOCYTES # BLD AUTO: 0.03 10*3/MM3 (ref 0–0.05)
IMM GRANULOCYTES NFR BLD AUTO: 0.5 % (ref 0–0.5)
IRON 24H UR-MRATE: 51 MCG/DL (ref 59–158)
IRON SATN MFR SERPL: 22 % (ref 20–50)
KETONES UR QL STRIP: NEGATIVE
LEUKOCYTE ESTERASE UR QL STRIP.AUTO: NEGATIVE
LYMPHOCYTES # BLD AUTO: 0.86 10*3/MM3 (ref 0.7–3.1)
LYMPHOCYTES NFR BLD AUTO: 15 % (ref 19.6–45.3)
MAGNESIUM SERPL-MCNC: 1.7 MG/DL (ref 1.6–2.4)
MCH RBC QN AUTO: 30.4 PG (ref 26.6–33)
MCHC RBC AUTO-ENTMCNC: 30.7 G/DL (ref 31.5–35.7)
MCV RBC AUTO: 99 FL (ref 79–97)
MONOCYTES # BLD AUTO: 0.84 10*3/MM3 (ref 0.1–0.9)
MONOCYTES NFR BLD AUTO: 14.6 % (ref 5–12)
NEUTROPHILS NFR BLD AUTO: 3.83 10*3/MM3 (ref 1.7–7)
NEUTROPHILS NFR BLD AUTO: 66.6 % (ref 42.7–76)
NITRITE UR QL STRIP: NEGATIVE
NRBC BLD AUTO-RTO: 0 /100 WBC (ref 0–0.2)
PH UR STRIP.AUTO: 5.5 [PH] (ref 5–8)
PHOSPHATE SERPL-MCNC: 3.8 MG/DL (ref 2.5–4.5)
PLATELET # BLD AUTO: 115 10*3/MM3 (ref 140–450)
PMV BLD AUTO: 10.2 FL (ref 6–12)
POTASSIUM SERPL-SCNC: 4.6 MMOL/L (ref 3.5–5.2)
PROT ?TM UR-MCNC: 95.5 MG/DL
PROT SERPL-MCNC: 6.4 G/DL (ref 6–8.5)
PROT UR QL STRIP: ABNORMAL
PROT/CREAT UR: 1829.5 MG/G CREA (ref 0–200)
RBC # BLD AUTO: 3.06 10*6/MM3 (ref 4.14–5.8)
RBC # UR STRIP: NORMAL /HPF
REF LAB TEST METHOD: NORMAL
SODIUM SERPL-SCNC: 136 MMOL/L (ref 136–145)
SP GR UR STRIP: 1.01 (ref 1–1.03)
SQUAMOUS #/AREA URNS HPF: NORMAL /HPF
TIBC SERPL-MCNC: 235 MCG/DL (ref 298–536)
TRANSFERRIN SERPL-MCNC: 158 MG/DL (ref 200–360)
URATE SERPL-MCNC: 7.5 MG/DL (ref 3.4–7)
UROBILINOGEN UR QL STRIP: ABNORMAL
WBC # UR STRIP: NORMAL /HPF
WBC NRBC COR # BLD: 5.75 10*3/MM3 (ref 3.4–10.8)

## 2023-11-08 PROCEDURE — 83735 ASSAY OF MAGNESIUM: CPT

## 2023-11-08 PROCEDURE — 84100 ASSAY OF PHOSPHORUS: CPT

## 2023-11-08 PROCEDURE — 84466 ASSAY OF TRANSFERRIN: CPT

## 2023-11-08 PROCEDURE — 85025 COMPLETE CBC W/AUTO DIFF WBC: CPT

## 2023-11-08 PROCEDURE — 25010000002 EPOETIN ALFA PER 1000 UNITS: Performed by: NURSE PRACTITIONER

## 2023-11-08 PROCEDURE — 84550 ASSAY OF BLOOD/URIC ACID: CPT

## 2023-11-08 PROCEDURE — 84156 ASSAY OF PROTEIN URINE: CPT

## 2023-11-08 PROCEDURE — 36415 COLL VENOUS BLD VENIPUNCTURE: CPT

## 2023-11-08 PROCEDURE — 82570 ASSAY OF URINE CREATININE: CPT

## 2023-11-08 PROCEDURE — 96372 THER/PROPH/DIAG INJ SC/IM: CPT

## 2023-11-08 PROCEDURE — 82728 ASSAY OF FERRITIN: CPT

## 2023-11-08 PROCEDURE — 83540 ASSAY OF IRON: CPT

## 2023-11-08 PROCEDURE — 81001 URINALYSIS AUTO W/SCOPE: CPT

## 2023-11-08 PROCEDURE — 80053 COMPREHEN METABOLIC PANEL: CPT

## 2023-11-08 RX ADMIN — ERYTHROPOIETIN 40000 UNITS: 40000 INJECTION, SOLUTION INTRAVENOUS; SUBCUTANEOUS at 09:11

## 2023-11-13 DIAGNOSIS — D63.1 ANEMIA DUE TO STAGE 4 CHRONIC KIDNEY DISEASE: Primary | ICD-10-CM

## 2023-11-13 DIAGNOSIS — N18.4 ANEMIA DUE TO STAGE 4 CHRONIC KIDNEY DISEASE: Primary | ICD-10-CM

## 2023-11-14 ENCOUNTER — PREP FOR SURGERY (OUTPATIENT)
Dept: OTHER | Facility: HOSPITAL | Age: 73
End: 2023-11-14
Payer: MEDICARE

## 2023-11-14 DIAGNOSIS — D64.9 SYMPTOMATIC ANEMIA: Primary | ICD-10-CM

## 2023-11-16 DIAGNOSIS — I51.89 DIASTOLIC DYSFUNCTION: ICD-10-CM

## 2023-11-16 DIAGNOSIS — I1A.0 RESISTANT HYPERTENSION: ICD-10-CM

## 2023-11-16 DIAGNOSIS — I25.9 IHD (ISCHEMIC HEART DISEASE): ICD-10-CM

## 2023-11-16 RX ORDER — FLUTICASONE PROPIONATE 50 MCG
2 SPRAY, SUSPENSION (ML) NASAL DAILY PRN
Qty: 16 G | Refills: 2 | Status: SHIPPED | OUTPATIENT
Start: 2023-11-16 | End: 2023-11-16 | Stop reason: SDUPTHER

## 2023-11-16 RX ORDER — CARVEDILOL 6.25 MG/1
6.25 TABLET ORAL 2 TIMES DAILY WITH MEALS
Qty: 180 TABLET | Refills: 3 | Status: SHIPPED | OUTPATIENT
Start: 2023-11-16 | End: 2023-11-16 | Stop reason: SDUPTHER

## 2023-11-16 RX ORDER — FLUTICASONE PROPIONATE 50 MCG
2 SPRAY, SUSPENSION (ML) NASAL DAILY PRN
Qty: 16 G | Refills: 2 | Status: SHIPPED | OUTPATIENT
Start: 2023-11-16

## 2023-11-16 RX ORDER — CARVEDILOL 25 MG/1
25 TABLET ORAL 2 TIMES DAILY WITH MEALS
Qty: 180 TABLET | Refills: 3 | Status: SHIPPED | OUTPATIENT
Start: 2023-11-16

## 2023-11-22 ENCOUNTER — INFUSION (OUTPATIENT)
Dept: ONCOLOGY | Facility: HOSPITAL | Age: 73
End: 2023-11-22
Payer: MEDICARE

## 2023-11-22 ENCOUNTER — LAB (OUTPATIENT)
Dept: LAB | Facility: HOSPITAL | Age: 73
End: 2023-11-22
Payer: MEDICARE

## 2023-11-22 ENCOUNTER — OFFICE VISIT (OUTPATIENT)
Dept: ONCOLOGY | Facility: CLINIC | Age: 73
End: 2023-11-22
Payer: MEDICARE

## 2023-11-22 VITALS
TEMPERATURE: 98 F | OXYGEN SATURATION: 97 % | RESPIRATION RATE: 16 BRPM | BODY MASS INDEX: 21.1 KG/M2 | WEIGHT: 155.8 LBS | DIASTOLIC BLOOD PRESSURE: 62 MMHG | HEART RATE: 61 BPM | SYSTOLIC BLOOD PRESSURE: 144 MMHG | HEIGHT: 72 IN

## 2023-11-22 VITALS
RESPIRATION RATE: 16 BRPM | HEIGHT: 72 IN | BODY MASS INDEX: 20.99 KG/M2 | SYSTOLIC BLOOD PRESSURE: 180 MMHG | HEART RATE: 58 BPM | DIASTOLIC BLOOD PRESSURE: 60 MMHG | WEIGHT: 155 LBS | TEMPERATURE: 97.7 F | OXYGEN SATURATION: 97 %

## 2023-11-22 DIAGNOSIS — D69.6 THROMBOCYTOPENIA: ICD-10-CM

## 2023-11-22 DIAGNOSIS — K70.30 ALCOHOLIC CIRRHOSIS, UNSPECIFIED WHETHER ASCITES PRESENT: ICD-10-CM

## 2023-11-22 DIAGNOSIS — N18.4 ANEMIA DUE TO STAGE 4 CHRONIC KIDNEY DISEASE: Primary | ICD-10-CM

## 2023-11-22 DIAGNOSIS — D50.9 IRON DEFICIENCY ANEMIA, UNSPECIFIED IRON DEFICIENCY ANEMIA TYPE: ICD-10-CM

## 2023-11-22 DIAGNOSIS — D63.1 ANEMIA DUE TO STAGE 4 CHRONIC KIDNEY DISEASE: ICD-10-CM

## 2023-11-22 DIAGNOSIS — C91.10 CLL (CHRONIC LYMPHOCYTIC LEUKEMIA): ICD-10-CM

## 2023-11-22 DIAGNOSIS — D63.1 ANEMIA DUE TO STAGE 4 CHRONIC KIDNEY DISEASE: Primary | ICD-10-CM

## 2023-11-22 DIAGNOSIS — N18.4 ANEMIA DUE TO STAGE 4 CHRONIC KIDNEY DISEASE: ICD-10-CM

## 2023-11-22 PROBLEM — K50.10 CROHN'S DISEASE OF LARGE INTESTINE WITHOUT COMPLICATION: Status: ACTIVE | Noted: 2023-08-21

## 2023-11-22 LAB
ALBUMIN SERPL-MCNC: 4 G/DL (ref 3.5–5.2)
ALBUMIN/GLOB SERPL: 1.4 G/DL
ALP SERPL-CCNC: 173 U/L (ref 39–117)
ALT SERPL W P-5'-P-CCNC: 11 U/L (ref 1–41)
ANION GAP SERPL CALCULATED.3IONS-SCNC: 14 MMOL/L (ref 5–15)
AST SERPL-CCNC: 16 U/L (ref 1–40)
BASOPHILS # BLD AUTO: 0.05 10*3/MM3 (ref 0–0.2)
BASOPHILS NFR BLD AUTO: 0.9 % (ref 0–1.5)
BILIRUB SERPL-MCNC: 0.3 MG/DL (ref 0–1.2)
BUN SERPL-MCNC: 56 MG/DL (ref 8–23)
BUN/CREAT SERPL: 16.9 (ref 7–25)
CALCIUM SPEC-SCNC: 8.3 MG/DL (ref 8.6–10.5)
CHLORIDE SERPL-SCNC: 106 MMOL/L (ref 98–107)
CO2 SERPL-SCNC: 19 MMOL/L (ref 22–29)
CREAT SERPL-MCNC: 3.31 MG/DL (ref 0.76–1.27)
DEPRECATED RDW RBC AUTO: 57.1 FL (ref 37–54)
EGFRCR SERPLBLD CKD-EPI 2021: 18.7 ML/MIN/1.73
EOSINOPHIL # BLD AUTO: 0.17 10*3/MM3 (ref 0–0.4)
EOSINOPHIL NFR BLD AUTO: 3 % (ref 0.3–6.2)
ERYTHROCYTE [DISTWIDTH] IN BLOOD BY AUTOMATED COUNT: 15.6 % (ref 12.3–15.4)
FERRITIN SERPL-MCNC: 218.7 NG/ML (ref 30–400)
GLOBULIN UR ELPH-MCNC: 2.8 GM/DL
GLUCOSE SERPL-MCNC: 119 MG/DL (ref 65–99)
HCT VFR BLD AUTO: 31.2 % (ref 37.5–51)
HGB BLD-MCNC: 9.4 G/DL (ref 13–17.7)
IMM GRANULOCYTES # BLD AUTO: 0.03 10*3/MM3 (ref 0–0.05)
IMM GRANULOCYTES NFR BLD AUTO: 0.5 % (ref 0–0.5)
IRON 24H UR-MRATE: 79 MCG/DL (ref 59–158)
IRON SATN MFR SERPL: 32 % (ref 20–50)
LYMPHOCYTES # BLD AUTO: 0.93 10*3/MM3 (ref 0.7–3.1)
LYMPHOCYTES NFR BLD AUTO: 16.6 % (ref 19.6–45.3)
MCH RBC QN AUTO: 30.2 PG (ref 26.6–33)
MCHC RBC AUTO-ENTMCNC: 30.1 G/DL (ref 31.5–35.7)
MCV RBC AUTO: 100.3 FL (ref 79–97)
MONOCYTES # BLD AUTO: 0.55 10*3/MM3 (ref 0.1–0.9)
MONOCYTES NFR BLD AUTO: 9.8 % (ref 5–12)
NEUTROPHILS NFR BLD AUTO: 3.88 10*3/MM3 (ref 1.7–7)
NEUTROPHILS NFR BLD AUTO: 69.2 % (ref 42.7–76)
NRBC BLD AUTO-RTO: 0 /100 WBC (ref 0–0.2)
PLATELET # BLD AUTO: 125 10*3/MM3 (ref 140–450)
PMV BLD AUTO: 9.6 FL (ref 6–12)
POTASSIUM SERPL-SCNC: 4.4 MMOL/L (ref 3.5–5.2)
PROT SERPL-MCNC: 6.8 G/DL (ref 6–8.5)
RBC # BLD AUTO: 3.11 10*6/MM3 (ref 4.14–5.8)
SODIUM SERPL-SCNC: 139 MMOL/L (ref 136–145)
TIBC SERPL-MCNC: 244 MCG/DL (ref 298–536)
TRANSFERRIN SERPL-MCNC: 164 MG/DL (ref 200–360)
WBC NRBC COR # BLD AUTO: 5.61 10*3/MM3 (ref 3.4–10.8)

## 2023-11-22 PROCEDURE — 80053 COMPREHEN METABOLIC PANEL: CPT

## 2023-11-22 PROCEDURE — 85025 COMPLETE CBC W/AUTO DIFF WBC: CPT

## 2023-11-22 PROCEDURE — 82728 ASSAY OF FERRITIN: CPT

## 2023-11-22 PROCEDURE — 83540 ASSAY OF IRON: CPT

## 2023-11-22 PROCEDURE — 36415 COLL VENOUS BLD VENIPUNCTURE: CPT

## 2023-11-22 PROCEDURE — 96372 THER/PROPH/DIAG INJ SC/IM: CPT

## 2023-11-22 PROCEDURE — 25010000002 EPOETIN ALFA PER 1000 UNITS: Performed by: NURSE PRACTITIONER

## 2023-11-22 PROCEDURE — 84466 ASSAY OF TRANSFERRIN: CPT

## 2023-11-22 RX ADMIN — ERYTHROPOIETIN 40000 UNITS: 40000 INJECTION, SOLUTION INTRAVENOUS; SUBCUTANEOUS at 11:29

## 2023-11-22 NOTE — PROGRESS NOTES
[facility] HEMATOLOGY & ONCOLOGY  [address]  [place] [address] [phone]    Patient Name: Ricardo Hugo  Encounter Date: 11/22/2023  YOB: 1950   Patient Number: [id number]    Hematology / Oncology Progress Note      HPI / REASON FOR VISIT: Ricardo Hugo is a 73 y.o. male who is followed by this office for CLL, Iron Deficiency Anemia, Chronic Kidney Disease.  He sees Dr. Rueda for nephrology.      Bone marrow biopsy done 7/12/2021:  with a flow cytometry showing a 2% monoclonal B-cell kappa population consistent with CLL/SLL.    CLL panel:  Negative for a t (11;14)/CCND1-IGH rearrangement  Negative for deletion of MARIA on the long arm of chromosome 11 q. 22  Negative for trisomy 12  Negative for deletion of DLEU1, DLEU2 on the long arm of chromosome 13 q. 14 and monosomy 13  Negative for deletion T p53 in the short arm of chromosome 17 P 13  Cytogenetics showed normal male karyotype  Bone marrow biopsy and aspirate show involvement by chronic lymphocytic leukemia/small lymphocytic lymphoma and up to 5% of cellularity, mild hypercellular marrow with maturing trilineage hematopoiesis, erythroid hyperplasia and a mild atypical small lymphocytic infiltrate with a dominant nodular pattern  Storage iron is present    He has received Injectafer for WALE and Retacrit for anemia in CKD in the past.    He presents to the clinic today for continued follow up.  He has no acute complaint.    Last Retacrit injection was 11/8 for Hgb 9.3.    He had labs drawn today and results were reviewed with him in office.     LABS    Lab Results - Last 18 Months   Lab Units 11/22/23  0918 11/08/23  0827 10/25/23  0841 10/11/23  0833 09/27/23  0816 09/13/23  0813 08/30/23  0818 08/03/23  0829 07/18/23  1038 06/22/23  0853 06/02/23  0903 05/05/23  1004 04/14/23  0836 02/10/23  0957 01/27/23  0717 01/13/23  0811 12/30/22  0824   HEMOGLOBIN g/dL  9.4* 9.3* 9.7* 11.2* 10.5* 9.4*   < > 7.7* 7.0*   < > 10.2* 10.2* 10.1*   < > 10.9*   < > 10.6*   HEMATOCRIT % 31.2* 30.3* 30.9* 36.0* 34.4* 31.2*   < > 25.5* 23.4*   < > 33.0* 32.6* 32.7*   < > 32.9*   < > 34.3*   MCV fL 100.3* 99.0* 98.1* 101.1* 102.7* 104.0*   < > 107.1* 105.4*   < > 99.7* 97.9* 100.3*   < > 97.1*   < > 98.3*   WBC 10*3/mm3 5.61 5.75 5.42 5.15 6.55 7.12   < > 4.34 5.13   < > 4.66 4.92 4.98   < > 4.85   < > 5.56   RDW % 15.6* 15.0 13.8 14.2 14.2 14.5   < > 16.0* 17.2*   < > 14.6 14.5 15.7*   < > 14.3   < > 14.1   MPV fL 9.6 10.2 10.0 9.9 10.1 9.7   < > 9.9 10.0   < > 10.2 10.3 9.6   < >  --    < > 10.1   PLATELETS 10*3/mm3 125* 115* 108* 101* 113* 102*   < > 101* 122*   < > 83* 100* 122*   < > 106*   < > 115*   IMM GRAN % % 0.5 0.5  --   --   --   --   --   --   --   --  0.2 0.4 1.0*  --   --   --  0.5   NEUTROS ABS 10*3/mm3 3.88 3.83  --   --   --   --   --  2.72 4.40  --  2.91 2.89 2.82  --  3.21  --  3.53   LYMPHS ABS 10*3/mm3 0.93 0.86  --   --   --   --   --  0.84  --   --  0.97 1.17 1.15  --  0.92  --  1.21   MONOS ABS 10*3/mm3 0.55 0.84  --   --   --   --   --  0.56  --   --  0.58 0.60 0.66  --  0.52  --  0.52   EOS ABS 10*3/mm3 0.17 0.15  --   --   --   --   --  0.16  --   --  0.15 0.20 0.26  --  0.14  --  0.22   BASOS ABS 10*3/mm3 0.05 0.04  --   --   --   --   --  0.04  --   --  0.04 0.04 0.04  --  0.04  --  0.05   IMMATURE GRANS (ABS) 10*3/mm3 0.03 0.03  --   --   --   --   --   --   --   --  0.01 0.02 0.05  --   --   --  0.03   NRBC /100 WBC 0.0 0.0  --   --   --   --   --   --   --   --  0.0 0.0 0.0  --  0.0  --  0.0   NEUTROPHIL % %  --   --   --   --   --   --   --   --  84.7*  --   --   --   --   --   --   --   --    MONOCYTES % %  --   --   --   --   --   --   --   --  4.1*  --   --   --   --   --   --   --   --     < > = values in this interval not displayed.       Lab Results - Last 18 Months   Lab Units 11/22/23  0918 11/08/23 0827 09/13/23  0813 08/03/23  0829 08/01/23  1019  07/18/23  1038 06/22/23  0853 05/05/23  1004 04/14/23  0836   GLUCOSE mg/dL 119* 109* 124* 134* 111* 104* 114*   < > 140*   SODIUM mmol/L 139 136 138 141 139 137 137   < > 138   POTASSIUM mmol/L 4.4 4.6 5.0 4.5 4.4 4.5 4.6   < > 4.4   CO2 mmol/L 19.0* 17.0* 17.0* 21.0* 21.7* 18.0* 19.0*   < > 19.0*   CHLORIDE mmol/L 106 104 106 108* 104 104 102   < > 104   ANION GAP mmol/L 14.0 15.0 15.0 12.0  --  15.0 16.0*   < > 15.0   CREATININE mg/dL 3.31* 3.17* 3.45* 3.19* 2.85* 3.24* 2.72*   < > 2.83*   BUN mg/dL 56* 53* 64* 47* 46* 67* 54*   < > 52*   BUN / CREAT RATIO  16.9 16.7 18.6 14.7 16.1 20.7 19.9   < > 18.4   CALCIUM mg/dL 8.3* 8.7 8.6 9.0 8.9 8.7 9.5   < > 8.9   ALK PHOS U/L 173* 151*  --  137*  --  153* 175*  --  153*   TOTAL PROTEIN g/dL 6.8 6.4  --  6.2  --  6.3 7.4  --  6.8   ALT (SGPT) U/L 11 11  --  8  --  9 17  --  16   AST (SGOT) U/L 16 18  --  13  --  13 19  --  19   BILIRUBIN mg/dL 0.3 0.4  --  0.4  --  0.5 0.7  --  0.5   ALBUMIN g/dL 4.0 3.8  --  3.8  --  3.9 4.3  --  4.3   GLOBULIN gm/dL 2.8 2.6  --  2.4  --  2.4 3.1  --  2.5    < > = values in this interval not displayed.       Lab Results - Last 18 Months   Lab Units 11/08/23  0827   URIC ACID mg/dL 7.5*         Lab Results - Last 18 Months   Lab Units 11/22/23  0918 11/08/23  0827 08/03/23  0829 07/18/23  1038 04/14/23  0836 01/27/23  0717 01/13/23  0811 11/18/22  0856   IRON mcg/dL 79 51* 67 69 64  --  76 56*   TIBC mcg/dL 244* 235* 244* 247* 262*  --  244* 277*   IRON SATURATION (TSAT) % 32 22 27 28 24  --  31 20   FERRITIN ng/mL 218.70 256.60  --  548.80* 282.40  --  435.60* 167.60   TSH uIU/mL  --   --   --   --   --  2.830  --   --          PAST MEDICAL HISTORY:  ALLERGIES:  Allergies   Allergen Reactions    Ondansetron Anaphylaxis and GI Intolerance    Zofran [Ondansetron Hcl] Anaphylaxis    Lortab [Hydrocodone-Acetaminophen] Other (See Comments) and Hallucinations     CLOSTROPHOBIC    Allopurinol Other (See Comments)     Pain on right side      CURRENT MEDICATIONS:  Outpatient Encounter Medications as of 11/22/2023   Medication Sig Dispense Refill    acetaminophen (Tylenol) 325 MG tablet Take 3 tablets by mouth Every 8 (Eight) Hours. Take every 8 hours for 3 days then take prn as needed. 100 tablet 2    albuterol sulfate  (90 Base) MCG/ACT inhaler USE 2 INHALATIONS FOUR TIMES A DAY AS NEEDED 20.1 g 3    ALPRAZolam (XANAX) 0.25 MG tablet TAKE 1 TABLET BY MOUTH AT NIGHT AS NEEDED FOR ANXIETY 90 tablet 1    aspirin (aspirin) 81 MG EC tablet Take  by mouth Daily.      atorvastatin (LIPITOR) 10 MG tablet TAKE 1 TABLET DAILY 90 tablet 3    azelastine (ASTELIN) 0.1 % nasal spray USE 1 SPRAY IN EACH NOSTRIL TWICE A DAY AS NEEDED 90 mL 1    carvedilol (COREG) 25 MG tablet Take 1 tablet by mouth 2 (Two) Times a Day With Meals. 180 tablet 3    cholestyramine (QUESTRAN) 4 g packet MIX AND DRINK 1 PACKET DAILY 90 packet 3    cloNIDine (Catapres) 0.3 MG tablet Take 1 tablet by mouth 3 (Three) Times a Day. 270 tablet 3    Copper Gluconate (Copper Caps) 2 MG capsule Take  by mouth.      diphenhydrAMINE HCl, Sleep, (ZzzQuil) 25 MG capsule Take  by mouth Every Night.      fexofenadine (ALLEGRA) 180 MG tablet Take 1 tablet by mouth Daily.      fluticasone (FLONASE) 50 MCG/ACT nasal spray 2 sprays into the nostril(s) as directed by provider Daily As Needed for Rhinitis. 16 g 2    furosemide (LASIX) 20 MG tablet TAKE 1 TABLET TWICE A  tablet 3    hydrALAZINE (APRESOLINE) 100 MG tablet Take 1 tablet by mouth 3 (Three) Times a Day. 100mg daily      levothyroxine (Synthroid) 75 MCG tablet Take 1 tablet by mouth Daily. 90 tablet 3    magnesium chloride ER 64 MG DR tablet Take 143 mg by mouth Daily.      Melatonin 10 MG capsule Take 2 capsules by mouth Every Night.      mesalamine (APRISO) 0.375 g 24 hr capsule Take 4 capsules by mouth Daily. 360 capsule 3    montelukast (SINGULAIR) 10 MG tablet Take 1 tablet by mouth Every Night. 90 tablet 3    Multiple  Vitamins-Minerals (PRESERVISION/LUTEIN) capsule Take 1 capsule by mouth 2 (two) times a day.      NIFEdipine CC (ADALAT CC) 90 MG 24 hr tablet Take 1 tablet by mouth Daily. 90 tablet 3    omeprazole (priLOSEC) 20 MG capsule TAKE 1 CAPSULE DAILY 90 capsule 1    Oxymetazoline HCl (Nasal Spray) 0.05 % solution       potassium chloride 10 MEQ CR tablet Take 1 tablet by mouth 2 (Two) Times a Day. 180 tablet 3    sodium bicarbonate 650 MG tablet 2 qam, 1 at noon, and 2 qpm 450 tablet 3    valsartan (DIOVAN) 320 MG tablet Take 1 tablet by mouth Daily.      vitamin D (ERGOCALCIFEROL) 1.25 MG (20799 UT) capsule capsule Take 1 capsule by mouth 1 (One) Time Per Week. 15 capsule 3    clopidogrel (PLAVIX) 75 MG tablet TAKE 1 TABLET DAILY (Patient not taking: Reported on 11/22/2023) 90 tablet 3     Facility-Administered Encounter Medications as of 11/22/2023   Medication Dose Route Frequency Provider Last Rate Last Admin    cyanocobalamin injection 1,000 mcg  1,000 mcg Intramuscular Q28 Days Ruthie Parra, APRN   1,000 mcg at 08/11/23 1123     ADULT ILLNESSES:  Patient Active Problem List   Diagnosis Code    Chronic rhinitis J31.0    Resistant hypertension I1A.0    Chronic diarrhea K52.9    Symptomatic anemia D64.9    Wellness examination Z00.00    PAD (peripheral artery disease) I73.9    IHD (ischemic heart disease) I25.9    Carotid stenosis I65.29    Stenosis of right carotid artery I65.21    Carotid stenosis, right I65.21    Diastolic dysfunction I51.89    CRD (chronic renal disease), stage IV N18.4    Anemia due to chronic kidney disease N18.9, D63.1    Copper deficiency E61.0    Low iron  E61.1    CLL (chronic lymphocytic leukemia) C91.10    Perforation of left tympanic membrane H72.92    Mixed hyperlipidemia E78.2    Systolic murmur R01.1    Eustachian tube dysfunction, bilateral H69.93    Sensorineural hearing loss (SNHL), bilateral H90.3    Anticoagulated Z79.01    Nasal septal deviation J34.2    Hypertrophy of  inferior nasal turbinate J34.3    Unilateral recurrent inguinal hernia without obstruction or gangrene K40.91    Bilateral inguinal hernia without obstruction or gangrene K40.20    Sleep difficulties G47.9    Crohn's disease of large intestine without complication K50.10     SURGERIES:  Past Surgical History:   Procedure Laterality Date    ARTERY SURGERY  2021    right side on neck    CAROTID ENDARTERECTOMY Right 5/10/2021    Procedure: RIGHT CAROTID ENDARTERECTOMY WITH EEG;  Surgeon: Gil Pineda DO;  Location: [facility] HYBRID OR 12;  Service: Vascular;  Laterality: Right;    COLONOSCOPY N/A 7/2/2020    Procedure: COLONOSCOPY WITH ANESTHESIA;  Surgeon: Adrien Brewster MD;  Location: [facility] ENDOSCOPY;  Service: Gastroenterology;  Laterality: N/A;  pre op: diarrhea  post op: polyps  PCP: Joe Velasco MD    COLONOSCOPY N/A 10/13/2020    Procedure: COLONOSCOPY WITH ANESTHESIA;  Surgeon: Adrien Brewster MD;  Location: [facility] ENDOSCOPY;  Service: Gastroenterology;  Laterality: N/A;  Pre: Chronic Diarrhea, Crohn's  Post: AVM  Dr. Neftali Velasco  CO2 Inflation Used    CORONARY ARTERY BYPASS GRAFT  2003    x3    ENDOSCOPY N/A 11/2/2021    Procedure: ESOPHAGOGASTRODUODENOSCOPY WITH ANESTHESIA;  Surgeon: Bridger Bell MD;  Location: [facility] ENDOSCOPY;  Service: Gastroenterology;  Laterality: N/A;  pre anemia;gi bleed  post  gi bleed;schatski ring  Dr. ERIC Velasco    ENDOSCOPY N/A 10/10/2023    Procedure: ESOPHAGOGASTRODUODENOSCOPY WITH ANESTHESIA;  Surgeon: Adrien Brewster MD;  Location: [facility] ENDOSCOPY;  Service: Gastroenterology;  Laterality: N/A;  preop; anemia  postop esophagitis ; R/O barretts   PCP Randall Beata    EYE SURGERY Bilateral     catorac    INCISION AND DRAINAGE PERIRECTAL ABSCESS N/A 3/3/2017    Procedure: INCISION AND DRAINAGE OF JEET ANAL ABSCESS;  Surgeon: Lynette Smith MD;  Location: [facility] OR;  Service:     INGUINAL HERNIA REPAIR Bilateral 6/27/2023    Procedure: INGUINAL HERNIA  BILATERAL REPAIR LAPAROSCOPIC WITH DAVINCI ROBOT WITH MESH;  Surgeon: Tahira Rivera MD;  Location: [facility] OR;  Service: Robotics - DaVinci;  Laterality: Bilateral;    MYRINGOTOMY W/ TUBES Left 04/17/2017    06/10/2016    TONSILLECTOMY      TOTAL HIP ARTHROPLASTY Right 2006     HEALTH MAINTENANCE ITEMS:  Health Maintenance Due   Topic Date Due    TDAP/TD VACCINES (1 - Tdap) Never done    Hepatitis B (1 of 3 - Risk 3-dose series) Never done    Pneumococcal Vaccine 65+ (3 - PPSV23 or PCV20) 12/28/2015    COLORECTAL CANCER SCREENING  10/13/2023    COVID-19 Vaccine (7 - 2023-24 season) 11/14/2023       <no information>  Last Completed Colonoscopy       This patient has no relevant Health Maintenance data.          Immunization History   Administered Date(s) Administered    ABRYSVO (RSV, pregnant 32-36 wks) 09/13/2023    COVID-19 (MODERNA) 1st,2nd,3rd Dose Monovalent 02/24/2021, 03/24/2021, 08/30/2021, 03/30/2022    COVID-19 (MODERNA) BIVALENT 12+YRS 09/08/2022    COVID-19 F23 (MODERNA) 12YRS+ (SPIKEVAX) 09/19/2023    Flu Vaccine Split Quad 09/30/2019    Fluad Quad 65+ 09/19/2023    Fluzone (or Fluarix & Flulaval for VFC) >6mos 09/22/2020    Fluzone High-Dose 65+yrs 10/12/2021, 09/08/2022    Fluzone Quad >6mos (Multi-dose) 09/25/2018, 09/30/2019, 09/22/2020    Influenza TIV (IM) 10/18/2017    Influenza Whole 09/28/2015    Pneumococcal Conjugate 13-Valent (PCV13) 12/28/2014    Pneumococcal Polysaccharide (PPSV23) 03/17/2006    Shingrix 12/04/2021, 02/02/2022    Zostavax 12/28/2014     Last Completed Mammogram       This patient has no relevant Health Maintenance data.              FAMILY HISTORY:  Family History   Problem Relation Age of Onset    Breast cancer Mother     Dementia Father     Glaucoma Father     No Known Problems Daughter     Colon polyps Neg Hx     Colon cancer Neg Hx      SOCIAL HISTORY:  Social History     Socioeconomic History    Marital status:    Tobacco Use    Smoking status: Former      "Packs/day: 0.50     Years: 25.00     Additional pack years: 0.00     Total pack years: 12.50     Types: Cigarettes     Start date: 1988     Quit date: 10/13/2013     Years since quitting: 10.1     Passive exposure: Past    Smokeless tobacco: Never    Tobacco comments:     quit 2013   Vaping Use    Vaping Use: Never used   Substance and Sexual Activity    Alcohol use: Not Currently    Drug use: No    Sexual activity: Not Currently     Partners: Female       REVIEW OF SYSTEMS:  Review of Systems   Constitutional:  Negative for activity change, appetite change, fatigue, fever, unexpected weight gain and unexpected weight loss.   HENT:  Negative for dental problem, facial swelling, swollen glands and trouble swallowing.    Eyes:  Negative for double vision and discharge.   Respiratory:  Negative for cough, shortness of breath and wheezing.    Cardiovascular:  Negative for chest pain, palpitations and leg swelling.   Gastrointestinal:  Negative for abdominal pain, blood in stool, nausea and vomiting.   Endocrine: Negative.    Genitourinary:  Negative for dysuria and hematuria.   Musculoskeletal:  Negative for arthralgias and myalgias.   Skin:  Negative for rash, skin lesions and wound.   Allergic/Immunologic: Negative for immunocompromised state.   Neurological:  Negative for speech difficulty, light-headedness, headache, memory problem and confusion.   Hematological:  Negative for adenopathy.   Psychiatric/Behavioral:  Negative for self-injury, suicidal ideas and depressed mood. The patient is not nervous/anxious.        /62   Pulse 61   Temp 98 °F (36.7 °C)   Resp 16   Ht 182.9 cm (72\")   Wt 70.7 kg (155 lb 12.8 oz)   SpO2 97%   BMI 21.13 kg/m²  Body surface area is 1.92 meters squared.    Pain Score    11/22/23 0922   PainSc: 0-No pain         Physical Exam  Constitutional:       Appearance: Normal appearance.   HENT:      Head: Normocephalic and atraumatic.   Cardiovascular:      Rate and Rhythm: Normal " rate and regular rhythm.   Pulmonary:      Effort: Pulmonary effort is normal.      Breath sounds: Normal breath sounds.   Abdominal:      General: Bowel sounds are normal.      Palpations: Abdomen is soft.   Musculoskeletal:      Right lower leg: No edema.      Left lower leg: No edema.   Skin:     General: Skin is warm and dry.   Neurological:      Mental Status: He is alert and oriented to person, place, and time.   Psychiatric:         Attention and Perception: Attention normal.         Mood and Affect: Mood normal.         Judgment: Judgment normal.         Ricardo Hugo reports a pain score of 0.  No intervention indicated         ASSESSMENT / PLAN    1. Anemia due to stage 4 chronic kidney disease    2. CLL (chronic lymphocytic leukemia)    3. Iron deficiency anemia, unspecified iron deficiency anemia type    4. Alcoholic cirrhosis, unspecified whether ascites present    5. Thrombocytopenia         ASSESSMENT:      retacrit feb 17  injectafer dec 15    1.  CLL  - Clinical stage 0 from 7/20/2021:  Bone marrow biopsy done 7/12/2021:  with a flow cytometry showing a 2% monoclonal B-cell kappa population consistent with CLL/SLL.    CLL panel:  Negative for a t (11;14)/CCND1-IGH rearrangement  Negative for deletion of MARIA on the long arm of chromosome 11 q. 22  Negative for trisomy 12  Negative for deletion of DLEU1, DLEU2 on the long arm of chromosome 13 q. 14 and monosomy 13  Negative for deletion T p53 in the short arm of chromosome 17 P 13  Cytogenetics showed normal male karyotype  Bone marrow biopsy and aspirate show involvement by chronic lymphocytic leukemia/small lymphocytic lymphoma and up to 5% of cellularity, mild hypercellular marrow with maturing trilineage hematopoiesis, erythroid hyperplasia and a mild atypical small lymphocytic infiltrate with a dominant nodular pattern  Storage iron is present  - Labs today with normal WBC and lymphocytes   -No unintentional weight loss, fever or night sweats    PLAN:  Stable for observation    2.  Anemia in Chronic Kidney Disease Stage IV  3.  Iron Deficiency Anemia   -Had Injectafer December 1, 2022   -Labs today: Hgb 9.4, Hct 31.2, Iron 79, Ferritin 218, Sat 32%, TIBC 244  -BUN 56, Creatinine 3.31, GFR 18.7  -Will Continue Retacrit today and biweekly for Hgb < 11 Or HCT < 33   -Sees Dr. Francis, Nephrology ([facility])        4. Cirrhosis of Liver  5.  Thrombocytopenia  -History of chronic alcoholism  - Cirrhotic morphology/appearance of the liver on 07/12/21 ultrasound  - Alk phos 173  -Likely cause of low platelets and exacerbating the anemia  -PLAN:  Stable for observation     PLAN:     Continue Retacrit 40K units  today and biweekly for Hgb < 11 or Hct < 33  -Continue current medications, treatment plans and follow up with PCP and any other specialist  Return to office in 3 months with CBC, CMP, Iron Profile and Ferritin   Care discussed with patient.  Understanding expressed.  Patient agreeable with plan.      Becky Camacho, APRN  11/22/2023

## 2023-11-24 NOTE — ASSESSMENT & PLAN NOTE
Left tube appears to be in process of extruding. Perforation at tube site has not healed. Perforation is small involving about 5% of TM surface. I doubt this will have any significant effect on his hearing and will serve to act as a 'permanent tube'.
diminished/wheezes

## 2023-11-27 ENCOUNTER — TELEPHONE (OUTPATIENT)
Dept: OTOLARYNGOLOGY | Facility: CLINIC | Age: 73
End: 2023-11-27
Payer: MEDICARE

## 2023-11-27 NOTE — TELEPHONE ENCOUNTER
"Hub staff attempted to follow warm transfer process and was unsuccessful     Caller: Ricardo Hugo \"Harjinder\"     Relationship to patient: SELF    Best call back number: [phone]    Patient is needing: PT CALLED AND STATED THAT HE IS LOOKING TO HAVE ANOTHER TUBE PUT IN HIS EAR AS THE ONE THAT HE HAD IN HAS FALLEN OUT. PLEASE REACH OUT TO THE PT TO SCHEDULE THIS         "

## 2023-11-29 DIAGNOSIS — D63.1 ANEMIA DUE TO STAGE 4 CHRONIC KIDNEY DISEASE: Primary | ICD-10-CM

## 2023-11-29 DIAGNOSIS — N18.4 ANEMIA DUE TO STAGE 4 CHRONIC KIDNEY DISEASE: Primary | ICD-10-CM

## 2023-12-04 RX ORDER — MESALAMINE 0.38 G/1
1.5 CAPSULE, EXTENDED RELEASE ORAL DAILY
Qty: 360 CAPSULE | Refills: 3 | Status: SHIPPED | OUTPATIENT
Start: 2023-12-04

## 2023-12-06 ENCOUNTER — INFUSION (OUTPATIENT)
Dept: ONCOLOGY | Facility: HOSPITAL | Age: 73
End: 2023-12-06
Payer: MEDICARE

## 2023-12-06 ENCOUNTER — LAB (OUTPATIENT)
Dept: LAB | Facility: HOSPITAL | Age: 73
End: 2023-12-06
Payer: MEDICARE

## 2023-12-06 VITALS
HEART RATE: 51 BPM | OXYGEN SATURATION: 95 % | DIASTOLIC BLOOD PRESSURE: 50 MMHG | HEIGHT: 72 IN | RESPIRATION RATE: 17 BRPM | SYSTOLIC BLOOD PRESSURE: 139 MMHG | BODY MASS INDEX: 21.67 KG/M2 | WEIGHT: 160 LBS | TEMPERATURE: 97.7 F

## 2023-12-06 DIAGNOSIS — D63.1 ANEMIA DUE TO STAGE 4 CHRONIC KIDNEY DISEASE: Primary | ICD-10-CM

## 2023-12-06 DIAGNOSIS — N18.4 ANEMIA DUE TO STAGE 4 CHRONIC KIDNEY DISEASE: Primary | ICD-10-CM

## 2023-12-06 DIAGNOSIS — N18.4 ANEMIA DUE TO STAGE 4 CHRONIC KIDNEY DISEASE: ICD-10-CM

## 2023-12-06 DIAGNOSIS — D63.1 ANEMIA DUE TO STAGE 4 CHRONIC KIDNEY DISEASE: ICD-10-CM

## 2023-12-06 LAB
BASOPHILS # BLD AUTO: 0.05 10*3/MM3 (ref 0–0.2)
BASOPHILS NFR BLD AUTO: 1.3 % (ref 0–1.5)
DEPRECATED RDW RBC AUTO: 59.2 FL (ref 37–54)
EOSINOPHIL # BLD AUTO: 0.18 10*3/MM3 (ref 0–0.4)
EOSINOPHIL NFR BLD AUTO: 4.6 % (ref 0.3–6.2)
ERYTHROCYTE [DISTWIDTH] IN BLOOD BY AUTOMATED COUNT: 15.8 % (ref 12.3–15.4)
HCT VFR BLD AUTO: 32.4 % (ref 37.5–51)
HGB BLD-MCNC: 9.9 G/DL (ref 13–17.7)
IMM GRANULOCYTES # BLD AUTO: 0.02 10*3/MM3 (ref 0–0.05)
IMM GRANULOCYTES NFR BLD AUTO: 0.5 % (ref 0–0.5)
LYMPHOCYTES # BLD AUTO: 0.86 10*3/MM3 (ref 0.7–3.1)
LYMPHOCYTES NFR BLD AUTO: 22.1 % (ref 19.6–45.3)
MCH RBC QN AUTO: 31 PG (ref 26.6–33)
MCHC RBC AUTO-ENTMCNC: 30.6 G/DL (ref 31.5–35.7)
MCV RBC AUTO: 101.6 FL (ref 79–97)
MONOCYTES # BLD AUTO: 0.49 10*3/MM3 (ref 0.1–0.9)
MONOCYTES NFR BLD AUTO: 12.6 % (ref 5–12)
NEUTROPHILS NFR BLD AUTO: 2.3 10*3/MM3 (ref 1.7–7)
NEUTROPHILS NFR BLD AUTO: 58.9 % (ref 42.7–76)
NRBC BLD AUTO-RTO: 0 /100 WBC (ref 0–0.2)
PLATELET # BLD AUTO: 98 10*3/MM3 (ref 140–450)
PMV BLD AUTO: 10.2 FL (ref 6–12)
RBC # BLD AUTO: 3.19 10*6/MM3 (ref 4.14–5.8)
WBC NRBC COR # BLD AUTO: 3.9 10*3/MM3 (ref 3.4–10.8)

## 2023-12-06 PROCEDURE — 25010000002 EPOETIN ALFA PER 1000 UNITS: Performed by: NURSE PRACTITIONER

## 2023-12-06 PROCEDURE — 85025 COMPLETE CBC W/AUTO DIFF WBC: CPT

## 2023-12-06 PROCEDURE — 36415 COLL VENOUS BLD VENIPUNCTURE: CPT

## 2023-12-06 PROCEDURE — 96372 THER/PROPH/DIAG INJ SC/IM: CPT

## 2023-12-06 RX ADMIN — ERYTHROPOIETIN 40000 UNITS: 40000 INJECTION, SOLUTION INTRAVENOUS; SUBCUTANEOUS at 12:34

## 2023-12-08 ENCOUNTER — ANESTHESIA EVENT (OUTPATIENT)
Dept: GASTROENTEROLOGY | Facility: HOSPITAL | Age: 73
End: 2023-12-08
Payer: MEDICARE

## 2023-12-08 ENCOUNTER — ANESTHESIA (OUTPATIENT)
Dept: GASTROENTEROLOGY | Facility: HOSPITAL | Age: 73
End: 2023-12-08
Payer: MEDICARE

## 2023-12-08 ENCOUNTER — HOSPITAL ENCOUNTER (OUTPATIENT)
Facility: HOSPITAL | Age: 73
Setting detail: HOSPITAL OUTPATIENT SURGERY
Discharge: HOME | End: 2023-12-08
Attending: INTERNAL MEDICINE | Admitting: INTERNAL MEDICINE
Payer: MEDICARE

## 2023-12-08 VITALS
SYSTOLIC BLOOD PRESSURE: 125 MMHG | RESPIRATION RATE: 19 BRPM | TEMPERATURE: 97.1 F | WEIGHT: 156 LBS | BODY MASS INDEX: 21.13 KG/M2 | OXYGEN SATURATION: 91 % | HEART RATE: 50 BPM | DIASTOLIC BLOOD PRESSURE: 47 MMHG | HEIGHT: 72 IN

## 2023-12-08 DIAGNOSIS — K50.10 CROHN'S DISEASE OF LARGE INTESTINE WITHOUT COMPLICATION: ICD-10-CM

## 2023-12-08 PROCEDURE — 25010000002 PROPOFOL 10 MG/ML EMULSION: Performed by: NURSE ANESTHETIST, CERTIFIED REGISTERED

## 2023-12-08 PROCEDURE — 88305 TISSUE EXAM BY PATHOLOGIST: CPT | Performed by: INTERNAL MEDICINE

## 2023-12-08 PROCEDURE — 25810000003 SODIUM CHLORIDE 0.9 % SOLUTION: Performed by: NURSE ANESTHETIST, CERTIFIED REGISTERED

## 2023-12-08 RX ORDER — LIDOCAINE HYDROCHLORIDE 10 MG/ML
0.5 INJECTION, SOLUTION EPIDURAL; INFILTRATION; INTRACAUDAL; PERINEURAL ONCE AS NEEDED
Status: DISCONTINUED | OUTPATIENT
Start: 2023-12-08 | End: 2023-12-08 | Stop reason: HOSPADM

## 2023-12-08 RX ORDER — SODIUM CHLORIDE 9 MG/ML
500 INJECTION, SOLUTION INTRAVENOUS CONTINUOUS PRN
Status: DISCONTINUED | OUTPATIENT
Start: 2023-12-08 | End: 2023-12-08 | Stop reason: HOSPADM

## 2023-12-08 RX ORDER — LIDOCAINE HYDROCHLORIDE 20 MG/ML
INJECTION, SOLUTION EPIDURAL; INFILTRATION; INTRACAUDAL; PERINEURAL AS NEEDED
Status: DISCONTINUED | OUTPATIENT
Start: 2023-12-08 | End: 2023-12-08 | Stop reason: SURG

## 2023-12-08 RX ORDER — SODIUM CHLORIDE 0.9 % (FLUSH) 0.9 %
10 SYRINGE (ML) INJECTION AS NEEDED
Status: DISCONTINUED | OUTPATIENT
Start: 2023-12-08 | End: 2023-12-08 | Stop reason: HOSPADM

## 2023-12-08 RX ORDER — PROPOFOL 10 MG/ML
VIAL (ML) INTRAVENOUS AS NEEDED
Status: DISCONTINUED | OUTPATIENT
Start: 2023-12-08 | End: 2023-12-08 | Stop reason: SURG

## 2023-12-08 RX ADMIN — SODIUM CHLORIDE 500 ML: 9 INJECTION, SOLUTION INTRAVENOUS at 07:30

## 2023-12-08 RX ADMIN — PROPOFOL INJECTABLE EMULSION 350 MG: 10 INJECTION, EMULSION INTRAVENOUS at 08:07

## 2023-12-08 RX ADMIN — LIDOCAINE HYDROCHLORIDE 50 MG: 20 INJECTION, SOLUTION EPIDURAL; INFILTRATION; INTRACAUDAL; PERINEURAL at 08:07

## 2023-12-08 NOTE — ANESTHESIA PREPROCEDURE EVALUATION
Anesthesia Evaluation     Patient summary reviewed   no history of anesthetic complications:   NPO Solid Status: > 8 hours             Airway   Mallampati: II  Small opening  Dental    (+) edentulous    Pulmonary - negative pulmonary ROS   (-) asthma, sleep apnea, not a smoker  Cardiovascular   Exercise tolerance: good (4-7 METS)    (+) hypertension, CAD, CABG (2002), PVD, hyperlipidemia,  carotid artery disease  (-) past MI, angina      Neuro/Psych- negative ROS  (-) seizures, TIA, CVA  GI/Hepatic/Renal/Endo    (+) renal disease- CRI, thyroid problem hypothyroidism  (-) liver disease, diabetes    Musculoskeletal     Abdominal    Substance History      OB/GYN          Other      history of cancer                  Anesthesia Plan    ASA 3     MAC       Anesthetic plan, risks, benefits, and alternatives have been provided, discussed and informed consent has been obtained with: patient.    CODE STATUS:

## 2023-12-08 NOTE — H&P
[facility] Gastroenterology  Pre Procedure History & Physical    Chief Complaint:   Crohn's    Subjective     HPI:   He has a history of Crohn's involving the colon.  He is asymptomatic.  Is been asymptomatic for few years.  He also has chronic iron deficiency anemia.  He has known AVMs.  He presents for colonoscopy investigation    Past Medical History:   Past Medical History:   Diagnosis Date    3-vessel CAD 8/11/2020    Allergic rhinitis     Anxiety disorder 4/27/2020    Arthritis     Asymmetrical sensorineural hearing loss 6/28/2017    Atherosclerosis of native artery of both lower extremities with intermittent claudication 7/18/2019    Avascular necrosis of femoral head, left 07/11/2020    right hip after surgery    Carotid stenosis     Chronic mucoid otitis media     Chronic rhinitis     Coronary artery disease     HEART BYPASS 2004    Crohn's disease of large intestine with other complication 7/30/2020    Chronic diarrhea Colonoscopy July 2020 revealed mild patchy scattered hemosiderin staining with inflammation more so in rectosigmoid area.  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    Iron deficiency anemia 7/14/2020    Mixed hearing loss of left ear     PAD (peripheral artery disease) 8/11/2020    Perianal abscess     Pernicious anemia 08/17/2020    took shots but never diagnosed with b12 deficiency    Personal history of alcoholism 08/11/2020    quit drinking in 2013    Prostatic hypertrophy 8/11/2020    Sensorineural hearing loss     Sepsis with acute renal failure 9/15/2020    Shortness of breath 5/27/2021    Tinnitus     Ventricular tachycardia, nonsustained  7/14/2020    Weight loss 7/11/2020       Past Surgical History:  Past Surgical History:   Procedure Laterality Date    ARTERY SURGERY  2021    right side on neck    CAROTID ENDARTERECTOMY Right 5/10/2021    Procedure: RIGHT CAROTID ENDARTERECTOMY WITH EEG;  Surgeon: Gil Pineda DO;  Location: [facility] HYBRID OR 12;  Service: Vascular;  Laterality: Right;    COLONOSCOPY N/A 7/2/2020    Procedure: COLONOSCOPY WITH ANESTHESIA;  Surgeon: Adrien Brewster MD;  Location: [facility] ENDOSCOPY;  Service: Gastroenterology;  Laterality: N/A;  pre op: diarrhea  post op: polyps  PCP: Joe Velasco MD    COLONOSCOPY N/A 10/13/2020    Procedure: COLONOSCOPY WITH ANESTHESIA;  Surgeon: Adrien Brewster MD;  Location: [facility] ENDOSCOPY;  Service: Gastroenterology;  Laterality: N/A;  Pre: Chronic Diarrhea, Crohn's  Post: AVM  Dr. Neftali Velasco  CO2 Inflation Used    CORONARY ARTERY BYPASS GRAFT  2003    x3    ENDOSCOPY N/A 11/2/2021    Procedure: ESOPHAGOGASTRODUODENOSCOPY WITH ANESTHESIA;  Surgeon: Bridger Bell MD;  Location: [facility] ENDOSCOPY;  Service: Gastroenterology;  Laterality: N/A;  pre anemia;gi bleed  post  gi bleed;schatski ring  Dr. ERIC Velasco    ENDOSCOPY N/A 10/10/2023    Procedure: ESOPHAGOGASTRODUODENOSCOPY WITH ANESTHESIA;  Surgeon: Adrien Brewster MD;  Location: [facility] ENDOSCOPY;  Service: Gastroenterology;  Laterality: N/A;  preop; anemia  postop esophagitis ; R/O barretts   PCP Randall Beata    EYE SURGERY Bilateral     catorac    INCISION AND DRAINAGE PERIRECTAL ABSCESS N/A 3/3/2017    Procedure: INCISION AND DRAINAGE OF JEET ANAL ABSCESS;  Surgeon: Lynette Smith MD;  Location: [facility] OR;  Service:     INGUINAL HERNIA REPAIR Bilateral 6/27/2023    Procedure: INGUINAL HERNIA BILATERAL REPAIR LAPAROSCOPIC WITH DAVINCI ROBOT WITH MESH;  Surgeon: Tahira Rivera MD;  Location: [facility] OR;  Service: Robotics - DaVinci;  Laterality: Bilateral;    MYRINGOTOMY W/ TUBES Left 04/17/2017    06/10/2016     TONSILLECTOMY      TOTAL HIP ARTHROPLASTY Right 2006        Family History:  Family History   Problem Relation Age of Onset    Breast cancer Mother     Dementia Father     Glaucoma Father     No Known Problems Daughter     Colon polyps Neg Hx     Colon cancer Neg Hx        Social History:   reports that he quit smoking about 10 years ago. His smoking use included cigarettes. He started smoking about 35 years ago. He has a 12.50 pack-year smoking history. He has been exposed to tobacco smoke. He has never used smokeless tobacco. He reports that he does not currently use alcohol. He reports that he does not use drugs.    Medications:   Prior to Admission medications    Medication Sig Start Date End Date Taking? Authorizing Provider   aspirin (aspirin) 81 MG EC tablet Take  by mouth Daily.   Yes Twyla Guevara MD   atorvastatin (LIPITOR) 10 MG tablet TAKE 1 TABLET DAILY 9/18/23  Yes Eleuterio Farley MD   carvedilol (COREG) 25 MG tablet Take 1 tablet by mouth 2 (Two) Times a Day With Meals. 11/16/23  Yes Maria L Zambrano APRN   cholestyramine (QUESTRAN) 4 g packet MIX AND DRINK 1 PACKET DAILY 10/23/23  Yes Adrien Brewster MD   cloNIDine (Catapres) 0.3 MG tablet Take 1 tablet by mouth 3 (Three) Times a Day. 8/3/23  Yes Nathan Zimmer MD   diphenhydrAMINE HCl, Sleep, (ZzzQuil) 25 MG capsule Take  by mouth Every Night.   Yes Twyla Guevara MD   fexofenadine (ALLEGRA) 180 MG tablet Take 1 tablet by mouth Daily.   Yes Twyla Guevara MD   furosemide (LASIX) 20 MG tablet TAKE 1 TABLET TWICE A DAY 4/26/23  Yes Nathan Zimmer MD   hydrALAZINE (APRESOLINE) 100 MG tablet Take 1 tablet by mouth 3 (Three) Times a Day. 100mg daily 8/1/23  Yes Nathan Zimmer MD   levothyroxine (Synthroid) 75 MCG tablet Take 1 tablet by mouth Daily. 1/5/23  Yes Nathan Zimmer MD   magnesium chloride ER 64 MG DR tablet Take 143 mg by mouth Daily.   Yes Twyla Guevara MD   Melatonin 10 MG  capsule Take 2 capsules by mouth Every Night.   Yes Twyla Guevara MD   mesalamine (APRISO) 0.375 g 24 hr capsule TAKE 4 CAPSULES DAILY 12/4/23  Yes Adrien Brewster MD   montelukast (SINGULAIR) 10 MG tablet Take 1 tablet by mouth Every Night. 8/3/23  Yes Nathan Zimmer MD   Multiple Vitamins-Minerals (PRESERVISION/LUTEIN) capsule Take 1 capsule by mouth 2 (two) times a day.   Yes Twyla Guevara MD   NIFEdipine CC (ADALAT CC) 90 MG 24 hr tablet Take 1 tablet by mouth Daily. 11/7/23  Yes Nathan Zimmer MD   omeprazole (priLOSEC) 20 MG capsule TAKE 1 CAPSULE DAILY 2/8/22  Yes Tahira Mendez APRN   potassium chloride 10 MEQ CR tablet Take 1 tablet by mouth 2 (Two) Times a Day. 2/17/23  Yes Ruthie Parra CSTERLING   sodium bicarbonate 650 MG tablet 2 qam, 1 at noon, and 2 qpm 5/9/23  Yes Nathan Zimmer MD   valsartan (DIOVAN) 320 MG tablet Take 1 tablet by mouth Daily. 5/17/23  Yes Nathan Zimmer MD   acetaminophen (Tylenol) 325 MG tablet Take 3 tablets by mouth Every 8 (Eight) Hours. Take every 8 hours for 3 days then take prn as needed. 6/27/23 6/26/24  Tahira Rivera MD   albuterol sulfate  (90 Base) MCG/ACT inhaler USE 2 INHALATIONS FOUR TIMES A DAY AS NEEDED 7/12/21   Tahira Mendez APRN   azelastine (ASTELIN) 0.1 % nasal spray USE 1 SPRAY IN EACH NOSTRIL TWICE A DAY AS NEEDED 2/8/22   Tahira Mendez APRN   fluticasone (FLONASE) 50 MCG/ACT nasal spray 2 sprays into the nostril(s) as directed by provider Daily As Needed for Rhinitis. 11/16/23   Maria L Zambrano APRN   Oxymetazoline HCl (Nasal Spray) 0.05 % solution  8/11/22   Twyla Guevara MD   vitamin D (ERGOCALCIFEROL) 1.25 MG (68076 UT) capsule capsule Take 1 capsule by mouth 1 (One) Time Per Week. 2/13/23   Ruthie Parra APRN   ALPRAZolam (XANAX) 0.25 MG tablet TAKE 1 TABLET BY MOUTH AT NIGHT AS NEEDED FOR ANXIETY 10/30/23 12/8/23  Nathan Zimmer,  "MD   clopidogrel (PLAVIX) 75 MG tablet TAKE 1 TABLET DAILY  Patient not taking: Reported on 11/22/2023 7/7/23 12/8/23  Elena Jon APRN   Copper Gluconate (Copper Caps) 2 MG capsule Take  by mouth.  12/8/23  Provider, Historical, MD       Allergies:  Ondansetron, Zofran [ondansetron hcl], Lortab [hydrocodone-acetaminophen], and Allopurinol    ROS:    General: Weight stable  Respiratory: No SOA  Cardiovascular: No CP    Objective     Blood pressure 151/48, pulse 52, temperature 97.1 °F (36.2 °C), temperature source Temporal, resp. rate 16, height 182.9 cm (72\"), weight 70.8 kg (156 lb), SpO2 94%.    Physical Exam   Constitutional: Pt is oriented to person, place, and in no distress.   Cardiovascular: Normal rate, regular rhythm.    Pulmonary/Chest: Effort normal. No respiratory distress.    Abdominal: Nondistended.  Psychiatric: Mood, memory, affect and judgment appear normal.     Assessment & Plan     Diagnosis:  Crohn's, history of iron deficiency anemia    Anticipated Surgical Procedure:  Colonoscopy    The risks, benefits, and alternatives of this procedure have been discussed with the patient or the responsible party- the patient understands and agrees to proceed.    EMR Dragon/transcription disclaimer:  Much of this encounter note is electronic transcription/translation of spoken language to printed text.  The electronic translation of spoken language may be erroneous, or at times, nonsensical words or phrases may be inadvertently transcribed.  Although I have reviewed the note for such errors, some may still exist.  "

## 2023-12-08 NOTE — ANESTHESIA POSTPROCEDURE EVALUATION
Patient: Ricardo Hugo    Procedure Summary       Date: 12/08/23 Room / Location: [facility] ENDOSCOPY 4 / [facility] ENDOSCOPY    Anesthesia Start: 0801 Anesthesia Stop: 0845    Procedure: COLONOSCOPY WITH ANESTHESIA Diagnosis:       Crohn's disease of large intestine without complication      (Crohn's disease of large intestine without complication [K50.10])    Surgeons: Adrien Brewster MD Provider: Dc Kraus CRNA    Anesthesia Type: MAC ASA Status: 3            Anesthesia Type: MAC    Vitals  Vitals Value Taken Time   /47 12/08/23 0901   Temp     Pulse 49 12/08/23 0902   Resp 19 12/08/23 0900   SpO2 91 % 12/08/23 0902   Vitals shown include unfiled device data.        Post Anesthesia Care and Evaluation    Patient location during evaluation: PHASE II  Level of consciousness: awake  Pain management: adequate    Airway patency: patent  Anesthetic complications: No anesthetic complications  PONV Status: none  Cardiovascular status: acceptable  Respiratory status: acceptable  Hydration status: acceptable

## 2023-12-12 LAB
LAB AP CASE REPORT: NORMAL
Lab: NORMAL
PATH REPORT.FINAL DX SPEC: NORMAL
PATH REPORT.GROSS SPEC: NORMAL

## 2023-12-20 ENCOUNTER — LAB (OUTPATIENT)
Dept: LAB | Facility: HOSPITAL | Age: 73
End: 2023-12-20
Payer: MEDICARE

## 2023-12-20 ENCOUNTER — INFUSION (OUTPATIENT)
Dept: ONCOLOGY | Facility: HOSPITAL | Age: 73
End: 2023-12-20
Payer: MEDICARE

## 2023-12-20 VITALS
HEIGHT: 72 IN | BODY MASS INDEX: 21.54 KG/M2 | RESPIRATION RATE: 18 BRPM | DIASTOLIC BLOOD PRESSURE: 42 MMHG | SYSTOLIC BLOOD PRESSURE: 151 MMHG | WEIGHT: 159 LBS | OXYGEN SATURATION: 95 % | HEART RATE: 52 BPM | TEMPERATURE: 97.6 F

## 2023-12-20 DIAGNOSIS — D63.1 ANEMIA DUE TO STAGE 4 CHRONIC KIDNEY DISEASE: Primary | ICD-10-CM

## 2023-12-20 DIAGNOSIS — N18.4 ANEMIA DUE TO STAGE 4 CHRONIC KIDNEY DISEASE: Primary | ICD-10-CM

## 2023-12-20 DIAGNOSIS — N18.4 ANEMIA DUE TO STAGE 4 CHRONIC KIDNEY DISEASE: ICD-10-CM

## 2023-12-20 DIAGNOSIS — D63.1 ANEMIA DUE TO STAGE 4 CHRONIC KIDNEY DISEASE: ICD-10-CM

## 2023-12-20 LAB
BASOPHILS # BLD AUTO: 0.06 10*3/MM3 (ref 0–0.2)
BASOPHILS NFR BLD AUTO: 1.2 % (ref 0–1.5)
DEPRECATED RDW RBC AUTO: 57.4 FL (ref 37–54)
EOSINOPHIL # BLD AUTO: 0.14 10*3/MM3 (ref 0–0.4)
EOSINOPHIL NFR BLD AUTO: 2.7 % (ref 0.3–6.2)
ERYTHROCYTE [DISTWIDTH] IN BLOOD BY AUTOMATED COUNT: 15.6 % (ref 12.3–15.4)
HCT VFR BLD AUTO: 33.2 % (ref 37.5–51)
HGB BLD-MCNC: 10.3 G/DL (ref 13–17.7)
IMM GRANULOCYTES # BLD AUTO: 0.04 10*3/MM3 (ref 0–0.05)
IMM GRANULOCYTES NFR BLD AUTO: 0.8 % (ref 0–0.5)
LYMPHOCYTES # BLD AUTO: 1.01 10*3/MM3 (ref 0.7–3.1)
LYMPHOCYTES NFR BLD AUTO: 19.5 % (ref 19.6–45.3)
MCH RBC QN AUTO: 30.9 PG (ref 26.6–33)
MCHC RBC AUTO-ENTMCNC: 31 G/DL (ref 31.5–35.7)
MCV RBC AUTO: 99.7 FL (ref 79–97)
MONOCYTES # BLD AUTO: 0.56 10*3/MM3 (ref 0.1–0.9)
MONOCYTES NFR BLD AUTO: 10.8 % (ref 5–12)
NEUTROPHILS NFR BLD AUTO: 3.36 10*3/MM3 (ref 1.7–7)
NEUTROPHILS NFR BLD AUTO: 65 % (ref 42.7–76)
NRBC BLD AUTO-RTO: 0 /100 WBC (ref 0–0.2)
PLATELET # BLD AUTO: 108 10*3/MM3 (ref 140–450)
PMV BLD AUTO: 9.9 FL (ref 6–12)
RBC # BLD AUTO: 3.33 10*6/MM3 (ref 4.14–5.8)
WBC NRBC COR # BLD AUTO: 5.17 10*3/MM3 (ref 3.4–10.8)

## 2023-12-20 PROCEDURE — 36415 COLL VENOUS BLD VENIPUNCTURE: CPT

## 2023-12-20 PROCEDURE — 96372 THER/PROPH/DIAG INJ SC/IM: CPT

## 2023-12-20 PROCEDURE — 85025 COMPLETE CBC W/AUTO DIFF WBC: CPT

## 2023-12-20 PROCEDURE — 25010000002 EPOETIN ALFA PER 1000 UNITS: Performed by: NURSE PRACTITIONER

## 2023-12-20 RX ADMIN — ERYTHROPOIETIN 40000 UNITS: 40000 INJECTION, SOLUTION INTRAVENOUS; SUBCUTANEOUS at 10:18

## 2023-12-22 ENCOUNTER — TELEPHONE (OUTPATIENT)
Dept: INTERNAL MEDICINE | Facility: CLINIC | Age: 73
End: 2023-12-22
Payer: MEDICARE

## 2023-12-22 ENCOUNTER — OFFICE VISIT (OUTPATIENT)
Dept: INTERNAL MEDICINE | Facility: CLINIC | Age: 73
End: 2023-12-22
Payer: MEDICARE

## 2023-12-22 VITALS
DIASTOLIC BLOOD PRESSURE: 50 MMHG | HEART RATE: 54 BPM | BODY MASS INDEX: 21.75 KG/M2 | HEIGHT: 72 IN | SYSTOLIC BLOOD PRESSURE: 146 MMHG | OXYGEN SATURATION: 95 % | TEMPERATURE: 98.1 F | WEIGHT: 160.6 LBS

## 2023-12-22 DIAGNOSIS — H91.91 DECREASED HEARING OF RIGHT EAR: ICD-10-CM

## 2023-12-22 DIAGNOSIS — H92.21 BLEEDING FROM RIGHT EAR: Primary | ICD-10-CM

## 2023-12-22 PROCEDURE — 3078F DIAST BP <80 MM HG: CPT

## 2023-12-22 PROCEDURE — 3077F SYST BP >= 140 MM HG: CPT

## 2023-12-22 PROCEDURE — 99213 OFFICE O/P EST LOW 20 MIN: CPT

## 2023-12-22 RX ORDER — CLONIDINE 0.1 MG/24H
1 PATCH, EXTENDED RELEASE TRANSDERMAL WEEKLY
COMMUNITY

## 2023-12-22 RX ORDER — ALPRAZOLAM 0.25 MG/1
0.25 TABLET ORAL NIGHTLY
COMMUNITY

## 2023-12-22 NOTE — TELEPHONE ENCOUNTER
"  Caller: Ricardo Hugo \"Harjinder\"    Relationship: Self    Best call back number: [phone]     What is the best time to reach you: SOON PLEASE     Who are you requesting to speak with (clinical staff, provider,  specific staff member): CLINICAL STAFF          What was the call regarding: PATIENT REQUESTING A CALL BACK TO DISCUSS WHAT HE CAN DO FOR HIS HEAD COLD AND CONGESTION     Is it okay if the provider responds through MyChart: PREFERS A CALL BACK       "

## 2023-12-22 NOTE — PROGRESS NOTES
Subjective   Ricardo Hugo is a 73 y.o. male.   Chief Complaint   Patient presents with    Nasal Congestion     Has been congested for 6 weeks.        History of Present Illness   Harjinder presents to the office today with complaints of sinus congestion, pressure in right ear.  He reports that he chronically suffers from ear fullness, decreased hearing bilaterally but typically the hearing is better in his right ear, he states he also has sinus pressure that is chronic, he has been taking his nasal sprays including Flonase, Astelin, and his Allegra as prescribed.  He explains to me that he has an upcoming appointment with Dr. Larios on 8 January to replace tubes in his ears.  He states that in the last week on and off he has noticed some bloody discharge from the right ear.  He does not recall any trauma such as falls, being in any significant elevation, or hitting his head.  He does admit to symptoms using Q-tips but mostly uses his fingers to scratch his ears.  He denies experiencing any vertigo, he states he is familiar with the symptoms of this, he has also not noted any nystagmus.  No fevers, chills, sore throat, lymphadenopathy.  The following portions of the patient's history were reviewed and updated as appropriate: allergies, current medications, past family history, past medical history, past social history, past surgical history and problem list.    Review of Systems    Objective   Past Medical History:   Diagnosis Date    3-vessel CAD 8/11/2020    Allergic rhinitis     Anxiety disorder 4/27/2020    Arthritis     Asymmetrical sensorineural hearing loss 6/28/2017    Atherosclerosis of native artery of both lower extremities with intermittent claudication 7/18/2019    Avascular necrosis of femoral head, left 07/11/2020    right hip after surgery    Carotid stenosis     Chronic mucoid otitis media     Chronic rhinitis     Coronary artery disease     HEART BYPASS 2004    Crohn's disease of large intestine with  other complication 7/30/2020    Chronic diarrhea Colonoscopy July 2020 revealed mild patchy scattered hemosiderin staining with inflammation more so in rectosigmoid area.  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    Iron deficiency anemia 7/14/2020    Mixed hearing loss of left ear     PAD (peripheral artery disease) 8/11/2020    Perianal abscess     Pernicious anemia 08/17/2020    took shots but never diagnosed with b12 deficiency    Personal history of alcoholism 08/11/2020    quit drinking in 2013    Prostatic hypertrophy 8/11/2020    Sensorineural hearing loss     Sepsis with acute renal failure 9/15/2020    Shortness of breath 5/27/2021    Tinnitus     Ventricular tachycardia, nonsustained 7/14/2020    Weight loss 7/11/2020      Past Surgical History:   Procedure Laterality Date    ARTERY SURGERY  2021    right side on neck    CAROTID ENDARTERECTOMY Right 5/10/2021    Procedure: RIGHT CAROTID ENDARTERECTOMY WITH EEG;  Surgeon: Gil Pineda DO;  Location: [facility] OR ;  Service: Vascular;  Laterality: Right;    COLONOSCOPY N/A 7/2/2020    Procedure: COLONOSCOPY WITH ANESTHESIA;  Surgeon: Adrien Brewster MD;  Location: [facility] ENDOSCOPY;  Service: Gastroenterology;  Laterality: N/A;  pre op: diarrhea  post op: polyps  PCP: Joe Velasco MD    COLONOSCOPY N/A 10/13/2020    Procedure: COLONOSCOPY WITH ANESTHESIA;  Surgeon: Adrien Brewster MD;  Location: [facility] ENDOSCOPY;  Service: Gastroenterology;  Laterality: N/A;  Pre: Chronic Diarrhea, Crohn's  Post: AVM  Dr. Neftali Velasco  CO2 Inflation Used    COLONOSCOPY N/A 12/8/2023    Procedure: COLONOSCOPY WITH  ANESTHESIA;  Surgeon: Adrien Brewster MD;  Location: [facility] ENDOSCOPY;  Service: Gastroenterology;  Laterality: N/A;  pre chrone's disease  post sub optimal prep, polyp, chrone's      CORONARY ARTERY BYPASS GRAFT  2003    x3    ENDOSCOPY N/A 11/2/2021    Procedure: ESOPHAGOGASTRODUODENOSCOPY WITH ANESTHESIA;  Surgeon: Bridger Bell MD;  Location: [facility] ENDOSCOPY;  Service: Gastroenterology;  Laterality: N/A;  pre anemia;gi bleed  post  gi bleed;schatski ring  Dr. ERIC Velasco    ENDOSCOPY N/A 10/10/2023    Procedure: ESOPHAGOGASTRODUODENOSCOPY WITH ANESTHESIA;  Surgeon: Adrien Brewster MD;  Location: [facility] ENDOSCOPY;  Service: Gastroenterology;  Laterality: N/A;  preop; anemia  postop esophagitis ; R/O barretts   PCP Randall Beata    EYE SURGERY Bilateral     catorac    INCISION AND DRAINAGE PERIRECTAL ABSCESS N/A 3/3/2017    Procedure: INCISION AND DRAINAGE OF JEET ANAL ABSCESS;  Surgeon: Lynette Smith MD;  Location: [facility] OR;  Service:     INGUINAL HERNIA REPAIR Bilateral 6/27/2023    Procedure: INGUINAL HERNIA BILATERAL REPAIR LAPAROSCOPIC WITH DAVINCI ROBOT WITH MESH;  Surgeon: Tahira Rivera MD;  Location: [facility] OR;  Service: Robotics - DaVinci;  Laterality: Bilateral;    MYRINGOTOMY W/ TUBES Left 04/17/2017    06/10/2016    TONSILLECTOMY      TOTAL HIP ARTHROPLASTY Right 2006        Current Outpatient Medications:     albuterol sulfate  (90 Base) MCG/ACT inhaler, USE 2 INHALATIONS FOUR TIMES A DAY AS NEEDED, Disp: 20.1 g, Rfl: 3    ALPRAZolam (XANAX) 0.25 MG tablet, Take 1 tablet by mouth Every Night., Disp: , Rfl:     aspirin (aspirin) 81 MG EC tablet, Take  by mouth Daily., Disp: , Rfl:     atorvastatin (LIPITOR) 10 MG tablet, TAKE 1 TABLET DAILY, Disp: 90 tablet, Rfl: 3    azelastine (ASTELIN) 0.1 % nasal spray, USE 1 SPRAY IN EACH NOSTRIL TWICE A DAY AS NEEDED, Disp: 90 mL, Rfl: 1    carvedilol (COREG) 25 MG tablet, Take 1 tablet by mouth 2 (Two) Times a Day With  Meals., Disp: 180 tablet, Rfl: 3    cholestyramine (QUESTRAN) 4 g packet, MIX AND DRINK 1 PACKET DAILY, Disp: 90 packet, Rfl: 3    cloNIDine (Catapres) 0.3 MG tablet, Take 1 tablet by mouth 3 (Three) Times a Day., Disp: 270 tablet, Rfl: 3    cloNIDine (CATAPRES-TTS) 0.1 MG/24HR patch, Place 1 patch on the skin as directed by provider 1 (One) Time Per Week., Disp: , Rfl:     diphenhydrAMINE HCl, Sleep, (ZzzQuil) 25 MG capsule, Take  by mouth Every Night., Disp: , Rfl:     fexofenadine (ALLEGRA) 180 MG tablet, Take 1 tablet by mouth Daily., Disp: , Rfl:     fluticasone (FLONASE) 50 MCG/ACT nasal spray, 2 sprays into the nostril(s) as directed by provider Daily As Needed for Rhinitis., Disp: 16 g, Rfl: 2    furosemide (LASIX) 20 MG tablet, TAKE 1 TABLET TWICE A DAY, Disp: 180 tablet, Rfl: 3    hydrALAZINE (APRESOLINE) 100 MG tablet, Take 1 tablet by mouth 3 (Three) Times a Day. 100mg daily, Disp: , Rfl:     levothyroxine (Synthroid) 75 MCG tablet, Take 1 tablet by mouth Daily., Disp: 90 tablet, Rfl: 3    magnesium chloride ER 64 MG DR tablet, Take 143 mg by mouth Daily., Disp: , Rfl:     Melatonin 10 MG capsule, Take 2 capsules by mouth Every Night., Disp: , Rfl:     mesalamine (APRISO) 0.375 g 24 hr capsule, TAKE 4 CAPSULES DAILY, Disp: 360 capsule, Rfl: 3    montelukast (SINGULAIR) 10 MG tablet, Take 1 tablet by mouth Every Night., Disp: 90 tablet, Rfl: 3    Multiple Vitamins-Minerals (PRESERVISION/LUTEIN) capsule, Take 1 capsule by mouth 2 (two) times a day., Disp: , Rfl:     NIFEdipine CC (ADALAT CC) 90 MG 24 hr tablet, Take 1 tablet by mouth Daily., Disp: 90 tablet, Rfl: 3    omeprazole (priLOSEC) 20 MG capsule, TAKE 1 CAPSULE DAILY, Disp: 90 capsule, Rfl: 1    Oxymetazoline HCl (Nasal Spray) 0.05 % solution, , Disp: , Rfl:     potassium chloride 10 MEQ CR tablet, Take 1 tablet by mouth 2 (Two) Times a Day., Disp: 180 tablet, Rfl: 3    sodium bicarbonate 650 MG tablet, 2 qam, 1 at noon, and 2 qpm, Disp: 450  "tablet, Rfl: 3    valsartan (DIOVAN) 320 MG tablet, Take 1 tablet by mouth Daily., Disp: , Rfl:     vitamin D (ERGOCALCIFEROL) 1.25 MG (70583 UT) capsule capsule, Take 1 capsule by mouth 1 (One) Time Per Week., Disp: 15 capsule, Rfl: 3    Current Facility-Administered Medications:     cyanocobalamin injection 1,000 mcg, 1,000 mcg, Intramuscular, Q28 Days, Josh Parrava FRANCISCO, APRN, 1,000 mcg at 08/11/23 1123      /50 (BP Location: Left arm, Patient Position: Sitting, Cuff Size: Adult)   Pulse 54   Temp 98.1 °F (36.7 °C) (Temporal)   Ht 182.9 cm (72.01\")   Wt 72.8 kg (160 lb 9.6 oz)   SpO2 95%   BMI 21.78 kg/m²      Body mass index is 21.78 kg/m².  BMI is within normal parameters. No other follow-up for BMI required.       Physical Exam  Vitals and nursing note reviewed.   Constitutional:       General: He is not in acute distress.     Appearance: Normal appearance. He is normal weight. He is not ill-appearing, toxic-appearing or diaphoretic.   HENT:      Head: Normocephalic and atraumatic.      Right Ear: Decreased hearing noted. Drainage present. No tenderness. There is no impacted cerumen. No foreign body. No mastoid tenderness. There is hemotympanum. Tympanic membrane is injected and erythematous.      Left Ear: Decreased hearing noted. Tympanic membrane is scarred.   Eyes:      Extraocular Movements: Extraocular movements intact.      Conjunctiva/sclera: Conjunctivae normal.      Pupils: Pupils are equal, round, and reactive to light.   Pulmonary:      Effort: Pulmonary effort is normal.   Musculoskeletal:         General: Normal range of motion.   Neurological:      Mental Status: He is alert.   Psychiatric:         Mood and Affect: Mood normal.         Behavior: Behavior normal.         Thought Content: Thought content normal.         Judgment: Judgment normal.               Assessment & Plan   Diagnoses and all orders for this visit:    1. Bleeding from right ear (Primary)    2. Decreased hearing " of right ear               Plan of care reviewed with Harjinder  I have attempted to reach out to ENT however I suspect the office is closed as there was no response, I did proceed with sending a message to Dr. Larios so he would be aware of the current issue.  I went over with Harjinder the importance of him presenting to the emergency room if he did experience continued issues such as further bleeding, decreased hearing in the right ear, nystagmus, or vertigo.  I also stressed the importance of no further use of Q-tips.  Encouraged him to continue with his current medication regimen.  Low suspicion for infection at this time, none of the discharge that was noted appeared purulent, appears serosanguineous.  From what I can tell it appears the drainage is coming from the tube that is still present in the right tympanic membrane.  He does suffer from hearing loss at baseline, typically hears better out of the right ear but right now he feels like his hearing is equal bilaterally and this seemed to be evident when hearing was tested today.  Unless otherwise indicated he will keep his next scheduled appointment.      Please note that portions of this note were completed with a voice recognition program.     Electronically signed by STERLING Lui, 12/22/23, 12:36 CST.

## 2023-12-26 ENCOUNTER — TELEPHONE (OUTPATIENT)
Dept: OTOLARYNGOLOGY | Facility: CLINIC | Age: 73
End: 2023-12-26
Payer: MEDICARE

## 2023-12-26 NOTE — TELEPHONE ENCOUNTER
Nurse called patient. Spoke with him about the increased pressure he was having in the right ear and the occasional bloody discharge. Nurse added patient to schedule for 12/27/23 at 1345 and patient confirmed that this time worked perfectly for him.

## 2023-12-27 ENCOUNTER — OFFICE VISIT (OUTPATIENT)
Dept: OTOLARYNGOLOGY | Facility: CLINIC | Age: 73
End: 2023-12-27
Payer: MEDICARE

## 2023-12-27 VITALS
TEMPERATURE: 98.4 F | BODY MASS INDEX: 20.72 KG/M2 | SYSTOLIC BLOOD PRESSURE: 163 MMHG | HEIGHT: 72 IN | DIASTOLIC BLOOD PRESSURE: 67 MMHG | HEART RATE: 54 BPM | WEIGHT: 153 LBS

## 2023-12-27 DIAGNOSIS — H74.41 AURAL POLYP OF MIDDLE EAR, RIGHT: ICD-10-CM

## 2023-12-27 DIAGNOSIS — J34.2 NASAL SEPTAL DEVIATION: Primary | ICD-10-CM

## 2023-12-27 DIAGNOSIS — H69.93 DYSFUNCTION OF BOTH EUSTACHIAN TUBES: ICD-10-CM

## 2023-12-27 DIAGNOSIS — H72.92 PERFORATION OF LEFT TYMPANIC MEMBRANE: ICD-10-CM

## 2023-12-27 DIAGNOSIS — R04.0 EPISTAXIS: ICD-10-CM

## 2023-12-27 DIAGNOSIS — J31.0 CHRONIC RHINITIS: ICD-10-CM

## 2023-12-27 DIAGNOSIS — Z96.22 S/P MYRINGOTOMY WITH INSERTION OF TUBE: ICD-10-CM

## 2023-12-27 DIAGNOSIS — J34.3 HYPERTROPHY OF INFERIOR NASAL TURBINATE: ICD-10-CM

## 2023-12-27 RX ORDER — CIPROFLOXACIN AND DEXAMETHASONE 3; 1 MG/ML; MG/ML
3 SUSPENSION/ DROPS AURICULAR (OTIC) 2 TIMES DAILY
Qty: 7.5 ML | Refills: 0 | Status: SHIPPED | OUTPATIENT
Start: 2023-12-27

## 2023-12-27 NOTE — PROGRESS NOTES
Nathan Chen MD  [facility]SE & THRO[facility]  [address], [address]  [facility] [address]  Fax [phone]  Phone [phone]      Visit Type: FOLLOW UP   Chief Complaint   Patient presents with    Ear Problem        HPI  He presents for a follow up evaluation. He has had a recent flair up of otorrhea. The symptoms are localized to the right side. The symptoms severity was described as : moderate The symptoms have been : relatively constant for the last several weeks There have been no identified factors that aggravate the symptoms. There have been no factors that have improved the symptoms.  He reports he used ciprodex for a couple days and stopped.    He reports frequent epistaxis, he has been off plavix since November.  He would like to discuss his upcoming surgery and if he needs to proceed with this.     Patient also requesting a standing appointment every 4 months with Dr. Chen.    Past Medical History:   Diagnosis Date    3-vessel CAD 8/11/2020    Allergic rhinitis     Anxiety disorder 4/27/2020    Arthritis     Asymmetrical sensorineural hearing loss 6/28/2017    Atherosclerosis of native artery of both lower extremities with intermittent claudication 7/18/2019    Avascular necrosis of femoral head, left 07/11/2020    right hip after surgery    Carotid stenosis     Chronic mucoid otitis media     Chronic rhinitis     Coronary artery disease     HEART BYPASS 2004    Crohn's disease of large intestine with other complication 7/30/2020    Chronic diarrhea Colonoscopy July 2020 revealed mild patchy scattered hemosiderin staining with inflammation more so in rectosigmoid area.  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    Iron deficiency anemia 7/14/2020    Mixed hearing loss of left ear     PAD (peripheral artery disease) 8/11/2020    Perianal abscess     Pernicious anemia 08/17/2020    took shots but never diagnosed with b12 deficiency    Personal history of alcoholism 08/11/2020    quit drinking in 2013    Prostatic hypertrophy 8/11/2020    Sensorineural hearing loss     Sepsis with acute renal failure 9/15/2020    Shortness of breath 5/27/2021    Tinnitus     Ventricular tachycardia, nonsustained 7/14/2020    Weight loss 7/11/2020       Past Surgical History:   Procedure Laterality Date    ARTERY SURGERY  2021    right side on neck    CAROTID ENDARTERECTOMY Right 5/10/2021    Procedure: RIGHT CAROTID ENDARTERECTOMY WITH EEG;  Surgeon: Gil Pineda DO;  Location: [facility] OR ;  Service: Vascular;  Laterality: Right;    COLONOSCOPY N/A 7/2/2020    Procedure: COLONOSCOPY WITH ANESTHESIA;  Surgeon: Adrien Brewster MD;  Location: [facility] ENDOSCOPY;  Service: Gastroenterology;  Laterality: N/A;  pre op: diarrhea  post op: polyps  PCP: Joe Velasco MD    COLONOSCOPY N/A 10/13/2020    Procedure: COLONOSCOPY WITH ANESTHESIA;  Surgeon: Adrien Brewster MD;  Location: [facility] ENDOSCOPY;  Service: Gastroenterology;  Laterality: N/A;  Pre: Chronic Diarrhea, Crohn's  Post: AVM  Dr. Neftali Velasco  CO2 Inflation Used    COLONOSCOPY N/A 12/8/2023    Procedure: COLONOSCOPY WITH ANESTHESIA;  Surgeon: Adrien Brewster MD;  Location: [facility] ENDOSCOPY;  Service: Gastroenterology;  Laterality: N/A;  pre chrone's disease  post sub optimal prep, polyp, chrone's      CORONARY ARTERY BYPASS GRAFT  2003    x3    ENDOSCOPY N/A 11/2/2021    Procedure: ESOPHAGOGASTRODUODENOSCOPY WITH ANESTHESIA;  Surgeon: Bridger Bell MD;  Location: [facility] ENDOSCOPY;  Service: Gastroenterology;  Laterality: N/A;  pre  anemia;gi bleed  post  gi bleed;[facility]  Dr. ERIC Velasco    ENDOSCOPY N/A 10/10/2023    Procedure: ESOPHAGOGASTRODUODENOSCOPY WITH ANESTHESIA;  Surgeon: Adrien Brewster MD;  Location: [facility] ENDOSCOPY;  Service: Gastroenterology;  Laterality: N/A;  preop; anemia  postop esophagitis ; R/O barretts   PCP Randall Beata    EYE SURGERY Bilateral     catorac    INCISION AND DRAINAGE PERIRECTAL ABSCESS N/A 3/3/2017    Procedure: INCISION AND DRAINAGE OF JEET ANAL ABSCESS;  Surgeon: Lynette Smith MD;  Location: [facility] OR;  Service:     INGUINAL HERNIA REPAIR Bilateral 6/27/2023    Procedure: INGUINAL HERNIA BILATERAL REPAIR LAPAROSCOPIC WITH DAVINCI ROBOT WITH MESH;  Surgeon: Tahira Rivera MD;  Location: [facility] OR;  Service: Robotics - DaVinci;  Laterality: Bilateral;    MYRINGOTOMY W/ TUBES Left 04/17/2017    06/10/2016    TONSILLECTOMY      TOTAL HIP ARTHROPLASTY Right 2006       Family History: His family history includes Breast cancer in his mother; Dementia in his father; Glaucoma in his father; No Known Problems in his daughter.     Social History: He  reports that he quit smoking about 10 years ago. His smoking use included cigarettes. He started smoking about 36 years ago. He has a 12.50 pack-year smoking history. He has been exposed to tobacco smoke. He has never used smokeless tobacco. He reports that he does not currently use alcohol. He reports that he does not use drugs.    Home Medications:  ALPRAZolam, Melatonin, NIFEdipine CC, Nasal Spray, PreserVision/Lutein, albuterol sulfate HFA, aspirin, atorvastatin, azelastine, carvedilol, cholestyramine, ciprofloxacin-dexAMETHasone, cloNIDine, diphenhydrAMINE HCl (Sleep), fexofenadine, fluticasone, furosemide, hydrALAZINE, levothyroxine, magnesium chloride ER, mesalamine, montelukast, mupirocin, omeprazole, phenylephrine, potassium chloride, sodium bicarbonate, valsartan, and vitamin D    Allergies:  He is allergic to ondansetron, zofran [ondansetron  hcl], lortab [hydrocodone-acetaminophen], and allopurinol.       Vital Signs:   Temp:  [98.4 °F (36.9 °C)] 98.4 °F (36.9 °C)  Heart Rate:  [54] 54  BP: (163)/(67) 163/67  ENT Physical Exam  Constitutional  Appearance: patient appears well-developed, well-nourished and well-groomed,  Communication/Voice: communication appropriate for developmental age; vocal quality normal;  Head and Face  Appearance: head appears normal, face appears normal and face appears atraumatic;  Palpation: facial palpation normal;  Salivary: glands normal;  Ear  Hearing: impaired to conversational voice;  Auricles: bilateral auricles normal;  Ear Canals: right ear canal drainage (suctioned for exam) observed; drainage is mucoid; left ear canal normal;  Tympanic Membranes: right tympanic membrane tympanostomy tube noted; tympanostomy tube with otorrhea; left tympanic membrane perforated; central perforation perforation size: 50%;  Nose  External Nose: nasal discharge visible;  Internal Nose: bilateral intranasal mucosa edematous; nasal septal deviation present; right prominent septal vessel present; bilateral inferior turbinates with hypertrophy;  Oral Cavity/Oropharynx  Lips: normal;  Teeth: normal;  Gums: gingiva normal;  Tongue: normal;  Oral mucosa: normal;  Hard palate: normal;  Neck  Neck: neck normal;  Respiratory  Inspection: breathing unlabored;  Cardiovascular  Inspection: extremities are warm and well perfused;  Lymphatic  Palpation: lymph nodes normal;           Result Review    RESULTS REVIEW    I have reviewed the patients old records in the chart.     Assessment & Plan  Nasal septal deviation    Hypertrophy of inferior nasal turbinate    Chronic rhinitis    Perforation of left tympanic membrane    S/P myringotomy with insertion of tube    Dysfunction of both eustachian tubes    Epistaxis    Aural polyp of middle ear, right           New Medications Ordered This Visit   Medications    mupirocin (BACTROBAN) 2 % nasal ointment      Si application  into the nostril(s) as directed by provider 2 (Two) Times a Day for 14 days. Apply to the nose twice daily     Dispense:  3 g     Refill:  0    ciprofloxacin-dexAMETHasone (CIPRODEX) 0.3-0.1 % otic suspension     Sig: Administer 3 drops into ear(s) as directed by provider 2 (Two) Times a Day.     Dispense:  7.5 mL     Refill:  0     Protect getting water in the ears. If needed, may use over the counter silicone plugs or a cotton ball coated with vasoline when bathing.  Use hairdryer on a cool setting after bathing.  For proper use of ear drops, push on tragus (cartilage in front of ear canal) after drop placement.  Use antibiotic ointment in the front of the nose twice a day for 2 weeks. Then, switch to vasoline in the nose twice a day to keep the lining moist.  NOSEBLEED INSTRUCTIONS: Use over the counter antibiotic ointment or Vasoline to anterior nares twice a day. Salt water spray or gel to nose every 2 hours and as needed. Hypertension control is important, keeping systolic pressures less than 140 is possible. If epistaxis present, hold all ASA or NSAIDs. Use Afrin or Neosynephrine spray if epistaxis returns, Hold direct pressure to the fleshy portion of the nose for five minutes. If epistaxis continues, repeat and hold pressure for another five minutes. If bleeding continues, call the office or go to the emergency room for evaluation.   Return in about 3 months (around 3/27/2024).    Electronically signed by STERLING Orozco 23 2:03 PM CST.       Physician Attestation  I have seen and examined Ricardo Hugo and have reviewed the notes, assessments, and/or procedures and I concur with this documentation.    SUBJECTIVE:  He had bloody right-sided otorrhea after scratching his ear.  He has a history of tubes on that side.  He has a perforation on the left side.  We had previously discussed septoplasty but he is having second thoughts on that and feels that his symptoms are not  bad enough for surgery.  He was on Plavix but is not taking it currently.    OBJECTIVE:  He has a tube on the right side with granulation tissue around the base.  There is some old blood in the external auditory canal.  Left side has a stable 50% perforation which is central in nature without cholesteatoma.  There is a nasal septal deviation bilaterally.    Diagnoses and all orders for this visit:    1. Nasal septal deviation (Primary)  Overview:  CT Sinus Without Contrast (04/28/2023 10:16)-nasal septal deviation off to the right-hand side with compensatory hypertrophy of the left inferior turbinate there is bilateral right greater than left dana bullosa cells.      2. Hypertrophy of inferior nasal turbinate    3. Chronic rhinitis    4. Perforation of left tympanic membrane    5. S/P myringotomy with insertion of tube    6. Dysfunction of both eustachian tubes    7. Epistaxis    8. Aural polyp of middle ear, right    Other orders  -     mupirocin (BACTROBAN) 2 % nasal ointment; 1 application  into the nostril(s) as directed by provider 2 (Two) Times a Day for 14 days. Apply to the nose twice daily  Dispense: 3 g; Refill: 0  -     ciprofloxacin-dexAMETHasone (CIPRODEX) 0.3-0.1 % otic suspension; Administer 3 drops into ear(s) as directed by provider 2 (Two) Times a Day.  Dispense: 7.5 mL; Refill: 0      We have given him instructions on nosebleeds and how to control any bleeding from the ear.  I feel it is granulation tissue that is affecting it we will try to calm this down with Ciprodex.  If not we may need to consider tube removal.  Will hold off on his surgery for now.    Electronically signed by Nathan Chen MD, 12/27/23, 2:11 PM CST.

## 2023-12-27 NOTE — PATIENT INSTRUCTIONS
Nosebleed Fact Sheet    Nosebleeds are a common problem that we see in our clinic and can occur in any age group.  They can range from spotty bleeding to episodes of uncontrolled profuse bleeding.  Multiple medical conditions can contribute to nosebleeds and fortunately most of these can be controlled to limit and/or eliminate these bleeding episodes.    Most commonly bleeding occurs in the anterior or front part of the nose on the septum, or center wall of the nose.  This is because this area has several blood vessels vulnerable to both the drying effect of breathing, and to finger trauma of nose picking.  When this area become dry, the lining of the nose in this area can crack and cause the blood vessels underneath to bleed.    The following condition can contribute to and cause nosebleeds:  High Blood Pressure - When the blood pressure is high, the increased pressure can cause blood to be more easily pushed through a damaged blood vessel.  If your blood pressure is uncontrolled over 140 systolic, you may need to consult your primary-care physician to help limit your nosebleeds.  Low Platelets and Blood Clotting Factors - Certain medical conditions such as cancer and bleeding disorders can interfere with the body’s ability to form blood clots.  This interferes with the body’s own ability to stop nosebleeds.  Medications - Aspirin and aspirin type products such as nonsteroidal anti-inflammatory medications can interfere with body’s ability to form blood clots.  Nonsteroidal anti-inflammatory medications include medicines like ibuprofen (Motrin), naprosyn (Aleve), and Goody’s and BC powder.  Several cold and flu remedies also include aspirin.  If there’s a question, please ask you doctor or pharmacist.  Recently, it has been shown that some herbal medications, such as high doses of Vitamin E, can also interfere with the body’s ability to form clots.  Often times, these types of medications need to be discontinued  to help with nosebleed prevention.  Dryness - The nose needs to stay nice and moist to try to prevent any kind of cracking or injury to the nasal lining and underlying blood vessels.  The worst time of year for nosebleeds is in the wintertime when the humidity is low.  Occasionally, a nasal deviation can cause abnormal airflow that dries out an area on the septum and cause a nosebleed.  Trauma -One of the most common reasons for nosebleeds is trauma caused by nose picking or external injury.  If an area in your nose is irritated, scratching or picking it can cause it to easily bleed.  Recommendations for Preventing Nosebleeds:  Keep well hydrated by drinking at least six to eight glasses of water a day.  Increase the moisture of the nose by placing Vaseline in the front of the nose twice a day and irrigating the nose with nasal saline spray often (every 1-2 hrs).  Hold on taking aspirin or aspirin type products.  If you have high blood pressure, make sure this is well controlled.  Avoid nose picking and other forms of nasal trauma.  What to Do if Your Nose Bleeds:  Spray Afrin or a similar 12 hr nasal decongestant into the side that is bleeding.  With your thumb and forefinger, grasp the fleshy part of the nose and hold firm pressure.  If the bleeding is on the front part of the nose, it should make it stop.  Hold this pressure for 5 minutes on the clock then release.  If the nose is still bleeding, repeat.  If the nose is still bleeding after trying this 2-3 times, you may need to go to the emergency room for evaluation.  Call your doctor or go to the emergency room if you cannot get the bleeding to stop or if you feel dizzy or lightheaded after a large bleed.         CONTACT INFORMATION:  The main office phone number is [phone]. For emergencies after hours and on weekends, this number will convert over to our answering service and the on call provider will answer. Please try to keep non emergent phone calls/  questions to office hours 9am-5pm Monday through Friday.     [employer]  As an alternative, you can sign up and use the Epic MyChart system for more direct and quicker access for non emergent questions/ problems.  [facility] allows you to send messages to your doctor, view your test results, renew your prescriptions, schedule appointments, and more. To sign up, go to [email or web address].    If you have questions, you can email [email or web address] or call [phone] to talk to our [employer] staff. Remember, [employer] is NOT to be used for urgent needs. For medical emergencies, dial 911.

## 2023-12-28 ENCOUNTER — OFFICE VISIT (OUTPATIENT)
Dept: INTERNAL MEDICINE | Facility: CLINIC | Age: 73
End: 2023-12-28
Payer: MEDICARE

## 2023-12-28 VITALS
DIASTOLIC BLOOD PRESSURE: 46 MMHG | HEART RATE: 61 BPM | SYSTOLIC BLOOD PRESSURE: 128 MMHG | OXYGEN SATURATION: 95 % | HEIGHT: 72 IN | WEIGHT: 157 LBS | BODY MASS INDEX: 21.26 KG/M2 | TEMPERATURE: 97.9 F

## 2023-12-28 DIAGNOSIS — H72.92 PERFORATION OF LEFT TYMPANIC MEMBRANE: ICD-10-CM

## 2023-12-28 DIAGNOSIS — H69.93 EUSTACHIAN TUBE DYSFUNCTION, BILATERAL: Primary | ICD-10-CM

## 2023-12-28 DIAGNOSIS — H92.11 EAR DRAINAGE RIGHT: ICD-10-CM

## 2023-12-28 RX ORDER — PREDNISONE 10 MG/1
TABLET ORAL
Qty: 22 TABLET | Refills: 0 | Status: SHIPPED | OUTPATIENT
Start: 2023-12-28 | End: 2024-01-07

## 2023-12-28 RX ORDER — TRIAMCINOLONE ACETONIDE 40 MG/ML
40 INJECTION, SUSPENSION INTRA-ARTICULAR; INTRAMUSCULAR ONCE
Status: COMPLETED | OUTPATIENT
Start: 2023-12-28 | End: 2023-12-28

## 2023-12-28 RX ADMIN — TRIAMCINOLONE ACETONIDE 40 MG: 40 INJECTION, SUSPENSION INTRA-ARTICULAR; INTRAMUSCULAR at 08:40

## 2023-12-28 NOTE — PROGRESS NOTES
Subjective     Chief Complaint:  Ear fullness    HPI:  Patient presents today with complaints of ear fullness and feeling like he has fluid in his ear.  He saw Dr. Chen in his clinic yesterday.  The patient reports that he had his right ear canal suctioned out but he feels that the drainage is back.  The patient does have perforation of left tympanic membrane. Dr. Chen noted that the patient had bloody right-sided otorrhea after scratching his ear.  He has a tube on that side.  The patient reports that he has had some nasal congestion but his main complaint is the ear fullness.  He denies any ear pain.  He reports that whenever he lies down his symptoms go away but whenever he stands up it comes back.  He is currently using Ciprodex drops to help with the granulating tissue around the base of the tube in his right ear.  This was provided by Dr. Chen.  The patient reports that when this is occurred in the past he has required a steroid injection and 10-day course of prednisone.  He is confident that this combination will help resolve his symptoms.     Past Medical History:   Past Medical History:   Diagnosis Date    3-vessel CAD 8/11/2020    Allergic rhinitis     Anxiety disorder 4/27/2020    Arthritis     Asymmetrical sensorineural hearing loss 6/28/2017    Atherosclerosis of native artery of both lower extremities with intermittent claudication 7/18/2019    Avascular necrosis of femoral head, left 07/11/2020    right hip after surgery    Carotid stenosis     Chronic mucoid otitis media     Chronic rhinitis     Coronary artery disease     HEART BYPASS 2004    Crohn's disease of large intestine with other complication 7/30/2020    Chronic diarrhea Colonoscopy July 2020 revealed mild patchy scattered hemosiderin staining with inflammation more so in rectosigmoid area.  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    Iron deficiency anemia 7/14/2020    Mixed hearing loss of left ear     PAD (peripheral artery disease) 8/11/2020    Perianal abscess     Pernicious anemia 08/17/2020    took shots but never diagnosed with b12 deficiency    Personal history of alcoholism 08/11/2020    quit drinking in 2013    Prostatic hypertrophy 8/11/2020    Sensorineural hearing loss     Sepsis with acute renal failure 9/15/2020    Shortness of breath 5/27/2021    Tinnitus     Ventricular tachycardia, nonsustained 7/14/2020    Weight loss 7/11/2020     Past Surgical History:  Past Surgical History:   Procedure Laterality Date    ARTERY SURGERY  2021    right side on neck    CAROTID ENDARTERECTOMY Right 5/10/2021    Procedure: RIGHT CAROTID ENDARTERECTOMY WITH EEG;  Surgeon: Gil Pineda DO;  Location: [facility] OR ;  Service: Vascular;  Laterality: Right;    COLONOSCOPY N/A 7/2/2020    Procedure: COLONOSCOPY WITH ANESTHESIA;  Surgeon: Adrien Brewster MD;  Location: [facility] ENDOSCOPY;  Service: Gastroenterology;  Laterality: N/A;  pre op: diarrhea  post op: polyps  PCP: Joe Velasco MD    COLONOSCOPY N/A 10/13/2020    Procedure: COLONOSCOPY WITH ANESTHESIA;  Surgeon: Adrien Brewster MD;  Location: [facility] ENDOSCOPY;  Service: Gastroenterology;  Laterality: N/A;  Pre: Chronic Diarrhea, Crohn's  Post: AVM  Dr. Neftali Velasco  CO2 Inflation Used    COLONOSCOPY N/A 12/8/2023    Procedure: COLONOSCOPY WITH ANESTHESIA;  Surgeon: Adrien Brewster MD;  Location: [facility] ENDOSCOPY;  Service: Gastroenterology;  Laterality: N/A;  pre chrone's disease  post sub optimal prep, polyp, chrone's      CORONARY ARTERY BYPASS GRAFT  2003    x3    ENDOSCOPY N/A 11/2/2021    Procedure:  ESOPHAGOGASTRODUODENOSCOPY WITH ANESTHESIA;  Surgeon: Bridger Bell MD;  Location: [facility] ENDOSCOPY;  Service: Gastroenterology;  Laterality: N/A;  pre anemia;gi bleed  post  gi bleed;fabian Velasco    ENDOSCOPY N/A 10/10/2023    Procedure: ESOPHAGOGASTRODUODENOSCOPY WITH ANESTHESIA;  Surgeon: Adrien Brewster MD;  Location:  [facility] ENDOSCOPY;  Service: Gastroenterology;  Laterality: N/A;  preop; anemia  postop esophagitis ; R/O barretts   PCP Randall Beata    EYE SURGERY Bilateral     catorac    INCISION AND DRAINAGE PERIRECTAL ABSCESS N/A 3/3/2017    Procedure: INCISION AND DRAINAGE OF JEET ANAL ABSCESS;  Surgeon: Lynette Smith MD;  Location: [facility] OR;  Service:     INGUINAL HERNIA REPAIR Bilateral 6/27/2023    Procedure: INGUINAL HERNIA BILATERAL REPAIR LAPAROSCOPIC WITH DAVINCI ROBOT WITH MESH;  Surgeon: Tahira Rivera MD;  Location:  [facility] OR;  Service: Robotics - DaVinci;  Laterality: Bilateral;    MYRINGOTOMY W/ TUBES Left 04/17/2017    06/10/2016    TONSILLECTOMY      TOTAL HIP ARTHROPLASTY Right 2006       Allergies:  Allergies   Allergen Reactions    Ondansetron Anaphylaxis and GI Intolerance    Zofran [Ondansetron Hcl] Anaphylaxis    Lortab [Hydrocodone-Acetaminophen] Other (See Comments) and Hallucinations     CLOSTROPHOBIC    Allopurinol Other (See Comments)     Pain on right side     Medications:  Prior to Admission medications    Medication Sig Start Date End Date Taking? Authorizing Provider   albuterol sulfate  (90 Base) MCG/ACT inhaler USE 2 INHALATIONS FOUR TIMES A DAY AS NEEDED 7/12/21  Yes Tahira Mendez APRN   ALPRAZolam (XANAX) 0.25 MG tablet Take 1 tablet by mouth Every Night.   Yes ProviderTwyla MD   aspirin (aspirin) 81 MG EC tablet Take  by mouth Daily.   Yes ProviderTwyla MD   atorvastatin (LIPITOR) 10 MG tablet TAKE 1 TABLET DAILY 9/18/23  Yes Eleuterio Farley MD   azelastine (ASTELIN) 0.1 % nasal spray USE 1  SPRAY IN EACH NOSTRIL TWICE A DAY AS NEEDED 2/8/22  Yes Tahira Mendez APRN   carvedilol (COREG) 25 MG tablet Take 1 tablet by mouth 2 (Two) Times a Day With Meals. 11/16/23  Yes Maria L Zambrano APRN   cholestyramine (QUESTRAN) 4 g packet MIX AND DRINK 1 PACKET DAILY 10/23/23  Yes Adrien Brewster MD   ciprofloxacin-dexAMETHasone (CIPRODEX) 0.3-0.1 % otic suspension Administer 3 drops into ear(s) as directed by provider 2 (Two) Times a Day. 12/27/23  Yes Ruma Delgado APRN   cloNIDine (Catapres) 0.3 MG tablet Take 1 tablet by mouth 3 (Three) Times a Day. 8/3/23  Yes Nathan Zimmer MD   cloNIDine (CATAPRES-TTS) 0.1 MG/24HR patch Place 1 patch on the skin as directed by provider 1 (One) Time Per Week.   Yes Twyla Guevara MD   diphenhydrAMINE HCl, Sleep, (ZzzQuil) 25 MG capsule Take  by mouth Every Night.   Yes Twyla Guevara MD   fexofenadine (ALLEGRA) 180 MG tablet Take 1 tablet by mouth Daily.   Yes Twyla Guevara MD   fluticasone (FLONASE) 50 MCG/ACT nasal spray 2 sprays into the nostril(s) as directed by provider Daily As Needed for Rhinitis. 11/16/23  Yes Maria L Zambrano APRN   furosemide (LASIX) 20 MG tablet TAKE 1 TABLET TWICE A DAY 4/26/23  Yes Nathan Zimmer MD   hydrALAZINE (APRESOLINE) 100 MG tablet Take 1 tablet by mouth 3 (Three) Times a Day. 100mg daily 8/1/23  Yes Nathan Zimmer MD   levothyroxine (Synthroid) 75 MCG tablet Take 1 tablet by mouth Daily. 1/5/23  Yes Nathan Zimmer MD   magnesium chloride ER 64 MG DR tablet Take 143 mg by mouth Daily.   Yes Twyla Guevara MD   Melatonin 10 MG capsule Take 2 capsules by mouth Every Night.   Yes Twyla Guevara MD   mesalamine (APRISO) 0.375 g 24 hr capsule TAKE 4 CAPSULES DAILY 12/4/23  Yes Adrien Brewster MD   montelukast (SINGULAIR) 10 MG tablet Take 1 tablet by mouth Every Night. 8/3/23  Yes Nathan Zimmer MD   Multiple Vitamins-Minerals (PRESERVISION/LUTEIN)  "capsule Take 1 capsule by mouth 2 (two) times a day.   Yes Provider, MD Twyla   mupirocin (BACTROBAN) 2 % nasal ointment 1 application  into the nostril(s) as directed by provider 2 (Two) Times a Day for 14 days. Apply to the nose twice daily 12/27/23 1/10/24 Yes Ruma Delgado APRN   NIFEdipine CC (ADALAT CC) 90 MG 24 hr tablet Take 1 tablet by mouth Daily. 11/7/23  Yes Nathan Zimmer MD   omeprazole (priLOSEC) 20 MG capsule TAKE 1 CAPSULE DAILY 2/8/22  Yes Tahira Mendez APRN   Oxymetazoline HCl (Nasal Spray) 0.05 % solution  8/11/22  Yes ProviderTwyla MD   phenylephrine (MAYELA-SYNEPHRINE) 1 % nasal spray 1 spray into the nostril(s) as directed by provider Daily As Needed (ear bleeding). 12/22/23  Yes Josh Parrava C APRN   potassium chloride 10 MEQ CR tablet Take 1 tablet by mouth 2 (Two) Times a Day. 2/17/23  Yes Fidel Pala C, APRN   sodium bicarbonate 650 MG tablet 2 qam, 1 at noon, and 2 qpm 5/9/23  Yes Nathan Zimmer MD   valsartan (DIOVAN) 320 MG tablet Take 1 tablet by mouth Daily. 5/17/23  Yes Nathan Zimmer MD   vitamin D (ERGOCALCIFEROL) 1.25 MG (57692 UT) capsule capsule Take 1 capsule by mouth 1 (One) Time Per Week. 2/13/23  Yes Fidel Pala C APRN       Objective     Vital Signs: /46 (BP Location: Left arm, Patient Position: Sitting, Cuff Size: Adult)   Pulse 61   Temp 97.9 °F (36.6 °C) (Temporal)   Ht 182.9 cm (72.01\")   Wt 71.2 kg (157 lb)   SpO2 95%   BMI 21.29 kg/m²   Physical Exam  Vitals and nursing note reviewed.   Constitutional:       General: He is not in acute distress.     Appearance: Normal appearance.   HENT:      Right Ear: Drainage (ear canal, dried blood) present. A PE tube is present.      Left Ear: Tympanic membrane is perforated.   Cardiovascular:      Rate and Rhythm: Normal rate.      Pulses: Normal pulses.      Heart sounds: Normal heart sounds.   Pulmonary:      Effort: Pulmonary effort is normal. No " respiratory distress.      Breath sounds: Normal breath sounds.   Abdominal:      General: Bowel sounds are normal. There is no distension.      Palpations: Abdomen is soft.      Tenderness: There is no abdominal tenderness.   Skin:     General: Skin is warm and dry.      Capillary Refill: Capillary refill takes less than 2 seconds.      Findings: No bruising, lesion or rash.   Neurological:      Mental Status: He is alert and oriented to person, place, and time. Mental status is at baseline.      Motor: No weakness.       Results Reviewed:  Reviewed last A1c that was obtained in 2021 which was 4.8.  Reviewed note from visit with Dr. Chen on 12/27/2023.    Assessment / Plan     Assessment/Plan:  Diagnoses and all orders for this visit:    1. Eustachian tube dysfunction, bilateral (Primary)  -     triamcinolone acetonide (KENALOG-40) injection 40 mg  -     predniSONE (DELTASONE) 10 MG tablet; Take 4 tablets by mouth Daily for 3 days, THEN 2 tablets Daily for 3 days, THEN 1 tablet Daily for 4 days.  Dispense: 22 tablet; Refill: 0    2. Ear drainage right  -     triamcinolone acetonide (KENALOG-40) injection 40 mg  -     predniSONE (DELTASONE) 10 MG tablet; Take 4 tablets by mouth Daily for 3 days, THEN 2 tablets Daily for 3 days, THEN 1 tablet Daily for 4 days.  Dispense: 22 tablet; Refill: 0    3. Perforation of left tympanic membrane       Exam is unchanged from Dr. Chen's exam yesterday.  He still has dried blood in his right ear canal.  Kenalog 40 mg injection was given in the clinic today.  Patient will be provided a 10-day prednisone taper.  He will continue using Ciprodex drops as prescribed by Dr. Chen.  No indication for oral antibiotic therapy at this time.  He will call if his symptoms worsen or fail to improve.    Return for Next scheduled follow up. unless patient needs to be seen sooner or acute issues arise.    I have discussed the patient results/orders and and plan/recommendation with them at  today's visit.      Hamida Dexter, APRN   12/28/2023

## 2024-01-02 ENCOUNTER — TELEPHONE (OUTPATIENT)
Dept: ONCOLOGY | Facility: CLINIC | Age: 74
End: 2024-01-02

## 2024-01-02 NOTE — TELEPHONE ENCOUNTER
"    Caller: Ricardo Hugo \"Harjinder\"    Relationship to patient: Self    Best call back number: [phone]    Chief complaint: WOULD LIKE TO HAVE LABS DONE SOONER     Type of visit: LAB    Requested date: ANYTIME THIS MONTH IN JANUARY 2024     If rescheduling, when is the original appointment: 02/14/24     Additional notes:PLEASE ADVISE         "

## 2024-01-04 ENCOUNTER — LAB (OUTPATIENT)
Dept: LAB | Facility: HOSPITAL | Age: 74
End: 2024-01-04
Payer: MEDICARE

## 2024-01-04 DIAGNOSIS — D63.1 ANEMIA DUE TO STAGE 4 CHRONIC KIDNEY DISEASE: ICD-10-CM

## 2024-01-04 DIAGNOSIS — N18.4 ANEMIA DUE TO STAGE 4 CHRONIC KIDNEY DISEASE: ICD-10-CM

## 2024-01-04 LAB
BASOPHILS # BLD AUTO: 0.05 10*3/MM3 (ref 0–0.2)
BASOPHILS NFR BLD AUTO: 0.5 % (ref 0–1.5)
DEPRECATED RDW RBC AUTO: 58 FL (ref 37–54)
EOSINOPHIL # BLD AUTO: 0.01 10*3/MM3 (ref 0–0.4)
EOSINOPHIL NFR BLD AUTO: 0.1 % (ref 0.3–6.2)
ERYTHROCYTE [DISTWIDTH] IN BLOOD BY AUTOMATED COUNT: 15.9 % (ref 12.3–15.4)
HCT VFR BLD AUTO: 37.1 % (ref 37.5–51)
HGB BLD-MCNC: 11.6 G/DL (ref 13–17.7)
IMM GRANULOCYTES # BLD AUTO: 0.22 10*3/MM3 (ref 0–0.05)
IMM GRANULOCYTES NFR BLD AUTO: 2.2 % (ref 0–0.5)
LYMPHOCYTES # BLD AUTO: 3.31 10*3/MM3 (ref 0.7–3.1)
LYMPHOCYTES NFR BLD AUTO: 32.5 % (ref 19.6–45.3)
MCH RBC QN AUTO: 31.4 PG (ref 26.6–33)
MCHC RBC AUTO-ENTMCNC: 31.3 G/DL (ref 31.5–35.7)
MCV RBC AUTO: 100.3 FL (ref 79–97)
MONOCYTES # BLD AUTO: 1.08 10*3/MM3 (ref 0.1–0.9)
MONOCYTES NFR BLD AUTO: 10.6 % (ref 5–12)
NEUTROPHILS NFR BLD AUTO: 5.52 10*3/MM3 (ref 1.7–7)
NEUTROPHILS NFR BLD AUTO: 54.1 % (ref 42.7–76)
NRBC BLD AUTO-RTO: 0 /100 WBC (ref 0–0.2)
PLATELET # BLD AUTO: 145 10*3/MM3 (ref 140–450)
PMV BLD AUTO: 10.1 FL (ref 6–12)
RBC # BLD AUTO: 3.7 10*6/MM3 (ref 4.14–5.8)
WBC NRBC COR # BLD AUTO: 10.19 10*3/MM3 (ref 3.4–10.8)

## 2024-01-04 PROCEDURE — 36415 COLL VENOUS BLD VENIPUNCTURE: CPT

## 2024-01-04 PROCEDURE — 85025 COMPLETE CBC W/AUTO DIFF WBC: CPT

## 2024-01-08 ENCOUNTER — OFFICE VISIT (OUTPATIENT)
Dept: INTERNAL MEDICINE | Facility: CLINIC | Age: 74
End: 2024-01-08
Payer: MEDICARE

## 2024-01-08 VITALS
HEART RATE: 62 BPM | HEIGHT: 72 IN | WEIGHT: 162 LBS | OXYGEN SATURATION: 97 % | TEMPERATURE: 98.2 F | BODY MASS INDEX: 21.94 KG/M2 | SYSTOLIC BLOOD PRESSURE: 188 MMHG | DIASTOLIC BLOOD PRESSURE: 76 MMHG

## 2024-01-08 DIAGNOSIS — H66.004 RECURRENT ACUTE SUPPURATIVE OTITIS MEDIA OF RIGHT EAR WITHOUT SPONTANEOUS RUPTURE OF TYMPANIC MEMBRANE: Primary | ICD-10-CM

## 2024-01-08 DIAGNOSIS — Z96.22 MYRINGOTOMY TUBE STATUS: ICD-10-CM

## 2024-01-08 PROCEDURE — 3078F DIAST BP <80 MM HG: CPT | Performed by: INTERNAL MEDICINE

## 2024-01-08 PROCEDURE — 3077F SYST BP >= 140 MM HG: CPT | Performed by: INTERNAL MEDICINE

## 2024-01-08 PROCEDURE — 99213 OFFICE O/P EST LOW 20 MIN: CPT | Performed by: INTERNAL MEDICINE

## 2024-01-08 RX ORDER — AMOXICILLIN AND CLAVULANATE POTASSIUM 875; 125 MG/1; MG/1
1 TABLET, FILM COATED ORAL 2 TIMES DAILY
Qty: 20 TABLET | Refills: 0 | Status: SHIPPED | OUTPATIENT
Start: 2024-01-08

## 2024-01-08 RX ORDER — PREDNISONE 10 MG/1
TABLET ORAL
Qty: 21 TABLET | Refills: 0 | Status: SHIPPED | OUTPATIENT
Start: 2024-01-08

## 2024-01-08 NOTE — PROGRESS NOTES
"    Chief Complaint  Ear Fullness (R; pt denies pain, states he can't hear out of ear./Pt has tube in ear, wants removed. )    Subjective        Ricardo Hugo presents to [facility]  Ear Fullness       See below.     Objective   Vital Signs:  BP (!) 188/76 (BP Location: Left arm, Patient Position: Sitting, Cuff Size: Adult)   Pulse 62   Temp 98.2 °F (36.8 °C) (Infrared)   Ht 182.9 cm (72\")   Wt 73.5 kg (162 lb)   SpO2 97%   BMI 21.97 kg/m²   Estimated body mass index is 21.97 kg/m² as calculated from the following:    Height as of this encounter: 182.9 cm (72\").    Weight as of this encounter: 73.5 kg (162 lb).       BMI is within normal parameters. No other follow-up for BMI required.    Physical Exam  HENT:      Head: Normocephalic and atraumatic.      Ears:      Comments: Chronic perforation of the left TM with no infection.  Right TM has a myringotomy tube sideways at the 5 o'clock position.  Draining dark material.  Erythema and irritation of the remaining TM.  Eyes:      Conjunctiva/sclera: Conjunctivae normal.      Pupils: Pupils are equal, round, and reactive to light.   Pulmonary:      Effort: Pulmonary effort is normal. No respiratory distress.   Musculoskeletal:         General: No swelling.   Skin:     General: Skin is warm and dry.      Findings: No rash.   Neurological:      General: No focal deficit present.      Mental Status: He is alert and oriented to person, place, and time.   Psychiatric:         Mood and Affect: Mood normal.         Behavior: Behavior normal.         Thought Content: Thought content normal.         Judgment: Judgment normal.        Result Review :  Reviewed prior notes from our office and also Dr. Chen's office.          Assessment and Plan   Diagnoses and all orders for this visit:    1. Recurrent acute suppurative otitis media of right ear without spontaneous rupture of tympanic membrane (Primary)  -     amoxicillin-clavulanate " (AUGMENTIN) 875-125 MG per tablet; Take 1 tablet by mouth 2 (Two) Times a Day.  Dispense: 20 tablet; Refill: 0  -     predniSONE (DELTASONE) 10 MG tablet; Take 4 tablets daily for 3 days, then take 2 tablets daily for 3 days, then take 1 tablet daily for 3 days, then stop.  Dispense: 21 tablet; Refill: 0    2. Myringotomy tube status    Regular patient of Dr. Zimmer.  Presents today for problem visit.    He has seen Dr. Chen and 2 of my nurse practitioners in the last several weeks for similar issues.  He has a longstanding perforation of the left TM and has a myringotomy tube of the right TM.  He has also been having nosebleeds.  There are plans for possible nasal surgery on 2/1 with Dr. Chen.  There was mention in his office note from 12/27 that this tube may be removed in the future.    Recently completed a course of Ciprodex by ENT.  Recently on Kenalog IM followed by a prednisone taper by my office.  Plan to treat him with Augmentin and a shorter course of prednisone as he states that his symptoms have not resolved and the issue has been unbearable.  Continues to drain from the right ear.      He probably needs to contact Dr. Chen's office again and was told so.          Follow Up   Return for Next scheduled follow up.  Patient was given instructions and counseling regarding his condition or for health maintenance advice. Please see specific information pulled into the AVS if appropriate.      MAHENDRA Gomez DO       Electronically signed by JAMA Gomez DO, 01/08/24, 12:44 PM CST.

## 2024-01-11 RX ORDER — FLUTICASONE PROPIONATE 50 MCG
SPRAY, SUSPENSION (ML) NASAL
Qty: 16 G | Refills: 3 | Status: SHIPPED | OUTPATIENT
Start: 2024-01-11

## 2024-01-18 ENCOUNTER — TELEPHONE (OUTPATIENT)
Dept: OTOLARYNGOLOGY | Facility: CLINIC | Age: 74
End: 2024-01-18

## 2024-01-25 RX ORDER — POTASSIUM CHLORIDE 750 MG/1
10 TABLET, FILM COATED, EXTENDED RELEASE ORAL 2 TIMES DAILY
Qty: 180 TABLET | Refills: 3 | Status: SHIPPED | OUTPATIENT
Start: 2024-01-25

## 2024-02-14 ENCOUNTER — TRANSCRIBE ORDERS (OUTPATIENT)
Dept: ADMINISTRATIVE | Facility: HOSPITAL | Age: 74
End: 2024-02-14
Payer: MEDICARE

## 2024-02-14 ENCOUNTER — INFUSION (OUTPATIENT)
Dept: ONCOLOGY | Facility: HOSPITAL | Age: 74
End: 2024-02-14
Payer: MEDICARE

## 2024-02-14 ENCOUNTER — LAB (OUTPATIENT)
Dept: LAB | Facility: HOSPITAL | Age: 74
End: 2024-02-14
Payer: MEDICARE

## 2024-02-14 ENCOUNTER — OFFICE VISIT (OUTPATIENT)
Dept: ONCOLOGY | Facility: CLINIC | Age: 74
End: 2024-02-14
Payer: MEDICARE

## 2024-02-14 VITALS
WEIGHT: 147.2 LBS | BODY MASS INDEX: 19.94 KG/M2 | TEMPERATURE: 97.6 F | OXYGEN SATURATION: 96 % | SYSTOLIC BLOOD PRESSURE: 126 MMHG | DIASTOLIC BLOOD PRESSURE: 58 MMHG | RESPIRATION RATE: 16 BRPM | HEIGHT: 72 IN | HEART RATE: 50 BPM

## 2024-02-14 VITALS
DIASTOLIC BLOOD PRESSURE: 44 MMHG | OXYGEN SATURATION: 97 % | SYSTOLIC BLOOD PRESSURE: 141 MMHG | TEMPERATURE: 98.1 F | HEART RATE: 53 BPM

## 2024-02-14 DIAGNOSIS — I10 ESSENTIAL HYPERTENSION, MALIGNANT: ICD-10-CM

## 2024-02-14 DIAGNOSIS — E87.20 ACIDOSIS: ICD-10-CM

## 2024-02-14 DIAGNOSIS — C91.10 CLL (CHRONIC LYMPHOCYTIC LEUKEMIA): Primary | ICD-10-CM

## 2024-02-14 DIAGNOSIS — N18.4 ANEMIA DUE TO STAGE 4 CHRONIC KIDNEY DISEASE: ICD-10-CM

## 2024-02-14 DIAGNOSIS — D69.6 THROMBOCYTOPENIA: ICD-10-CM

## 2024-02-14 DIAGNOSIS — N18.4 ANEMIA DUE TO STAGE 4 CHRONIC KIDNEY DISEASE: Primary | ICD-10-CM

## 2024-02-14 DIAGNOSIS — D63.1 ANEMIA DUE TO STAGE 4 CHRONIC KIDNEY DISEASE: Primary | ICD-10-CM

## 2024-02-14 DIAGNOSIS — N18.4 CHRONIC KIDNEY DISEASE, STAGE IV (SEVERE): Primary | ICD-10-CM

## 2024-02-14 DIAGNOSIS — K74.60 HEPATIC CIRRHOSIS, UNSPECIFIED HEPATIC CIRRHOSIS TYPE, UNSPECIFIED WHETHER ASCITES PRESENT: ICD-10-CM

## 2024-02-14 DIAGNOSIS — D63.1 ANEMIA DUE TO STAGE 4 CHRONIC KIDNEY DISEASE: ICD-10-CM

## 2024-02-14 DIAGNOSIS — N18.4 CHRONIC KIDNEY DISEASE, STAGE IV (SEVERE): ICD-10-CM

## 2024-02-14 DIAGNOSIS — D50.9 IRON DEFICIENCY ANEMIA, UNSPECIFIED IRON DEFICIENCY ANEMIA TYPE: ICD-10-CM

## 2024-02-14 LAB
25(OH)D3 SERPL-MCNC: 69.9 NG/ML (ref 30–100)
ALBUMIN SERPL-MCNC: 4 G/DL (ref 3.5–5.2)
ALBUMIN/GLOB SERPL: 1.6 G/DL
ALP SERPL-CCNC: 133 U/L (ref 39–117)
ALT SERPL W P-5'-P-CCNC: 13 U/L (ref 1–41)
ANION GAP SERPL CALCULATED.3IONS-SCNC: 13 MMOL/L (ref 5–15)
AST SERPL-CCNC: 18 U/L (ref 1–40)
BACTERIA UR QL AUTO: ABNORMAL /HPF
BASOPHILS # BLD AUTO: 0.06 10*3/MM3 (ref 0–0.2)
BASOPHILS NFR BLD AUTO: 1.2 % (ref 0–1.5)
BILIRUB SERPL-MCNC: 0.4 MG/DL (ref 0–1.2)
BILIRUB UR QL STRIP: NEGATIVE
BUN SERPL-MCNC: 45 MG/DL (ref 8–23)
BUN/CREAT SERPL: 14.5 (ref 7–25)
CALCIUM SPEC-SCNC: 8.5 MG/DL (ref 8.6–10.5)
CHLORIDE SERPL-SCNC: 104 MMOL/L (ref 98–107)
CLARITY UR: CLEAR
CO2 SERPL-SCNC: 21 MMOL/L (ref 22–29)
COLOR UR: YELLOW
CREAT SERPL-MCNC: 3.1 MG/DL (ref 0.76–1.27)
CREAT UR-MCNC: 94.6 MG/DL
DEPRECATED RDW RBC AUTO: 58.2 FL (ref 37–54)
EGFRCR SERPLBLD CKD-EPI 2021: 20.2 ML/MIN/1.73
EOSINOPHIL # BLD AUTO: 0.07 10*3/MM3 (ref 0–0.4)
EOSINOPHIL NFR BLD AUTO: 1.4 % (ref 0.3–6.2)
ERYTHROCYTE [DISTWIDTH] IN BLOOD BY AUTOMATED COUNT: 15.9 % (ref 12.3–15.4)
FERRITIN SERPL-MCNC: 315.3 NG/ML (ref 30–400)
GLOBULIN UR ELPH-MCNC: 2.5 GM/DL
GLUCOSE SERPL-MCNC: 158 MG/DL (ref 65–99)
GLUCOSE UR STRIP-MCNC: NEGATIVE MG/DL
HCT VFR BLD AUTO: 27.3 % (ref 37.5–51)
HGB BLD-MCNC: 8.7 G/DL (ref 13–17.7)
HGB UR QL STRIP.AUTO: NEGATIVE
HYALINE CASTS UR QL AUTO: ABNORMAL /LPF
IMM GRANULOCYTES # BLD AUTO: 0.04 10*3/MM3 (ref 0–0.05)
IMM GRANULOCYTES NFR BLD AUTO: 0.8 % (ref 0–0.5)
IRON 24H UR-MRATE: 89 MCG/DL (ref 59–158)
IRON SATN MFR SERPL: 38 % (ref 20–50)
KETONES UR QL STRIP: NEGATIVE
LEUKOCYTE ESTERASE UR QL STRIP.AUTO: ABNORMAL
LYMPHOCYTES # BLD AUTO: 0.96 10*3/MM3 (ref 0.7–3.1)
LYMPHOCYTES NFR BLD AUTO: 19.5 % (ref 19.6–45.3)
MAGNESIUM SERPL-MCNC: 1.5 MG/DL (ref 1.6–2.4)
MCH RBC QN AUTO: 31.8 PG (ref 26.6–33)
MCHC RBC AUTO-ENTMCNC: 31.9 G/DL (ref 31.5–35.7)
MCV RBC AUTO: 99.6 FL (ref 79–97)
MONOCYTES # BLD AUTO: 0.56 10*3/MM3 (ref 0.1–0.9)
MONOCYTES NFR BLD AUTO: 11.4 % (ref 5–12)
NEUTROPHILS NFR BLD AUTO: 3.23 10*3/MM3 (ref 1.7–7)
NEUTROPHILS NFR BLD AUTO: 65.7 % (ref 42.7–76)
NITRITE UR QL STRIP: NEGATIVE
NRBC BLD AUTO-RTO: 0 /100 WBC (ref 0–0.2)
PH UR STRIP.AUTO: 5.5 [PH] (ref 5–8)
PHOSPHATE SERPL-MCNC: 4 MG/DL (ref 2.5–4.5)
PLATELET # BLD AUTO: 110 10*3/MM3 (ref 140–450)
PMV BLD AUTO: 9.8 FL (ref 6–12)
POTASSIUM SERPL-SCNC: 4.3 MMOL/L (ref 3.5–5.2)
PROT ?TM UR-MCNC: 215.2 MG/DL
PROT SERPL-MCNC: 6.5 G/DL (ref 6–8.5)
PROT UR QL STRIP: ABNORMAL
PROT/CREAT UR: 2274.8 MG/G CREA (ref 0–200)
PTH-INTACT SERPL-MCNC: 290.2 PG/ML (ref 15–65)
RBC # BLD AUTO: 2.74 10*6/MM3 (ref 4.14–5.8)
RBC # UR STRIP: ABNORMAL /HPF
REF LAB TEST METHOD: ABNORMAL
SODIUM SERPL-SCNC: 138 MMOL/L (ref 136–145)
SP GR UR STRIP: 1.02 (ref 1–1.03)
SQUAMOUS #/AREA URNS HPF: ABNORMAL /HPF
TIBC SERPL-MCNC: 235 MCG/DL (ref 298–536)
TRANSFERRIN SERPL-MCNC: 158 MG/DL (ref 200–360)
URATE SERPL-MCNC: 8 MG/DL (ref 3.4–7)
UROBILINOGEN UR QL STRIP: ABNORMAL
WBC # UR STRIP: ABNORMAL /HPF
WBC NRBC COR # BLD AUTO: 4.92 10*3/MM3 (ref 3.4–10.8)

## 2024-02-14 PROCEDURE — 83970 ASSAY OF PARATHORMONE: CPT

## 2024-02-14 PROCEDURE — 83735 ASSAY OF MAGNESIUM: CPT

## 2024-02-14 PROCEDURE — 84156 ASSAY OF PROTEIN URINE: CPT

## 2024-02-14 PROCEDURE — 82570 ASSAY OF URINE CREATININE: CPT

## 2024-02-14 PROCEDURE — 96372 THER/PROPH/DIAG INJ SC/IM: CPT

## 2024-02-14 PROCEDURE — 84466 ASSAY OF TRANSFERRIN: CPT

## 2024-02-14 PROCEDURE — 82728 ASSAY OF FERRITIN: CPT

## 2024-02-14 PROCEDURE — 84100 ASSAY OF PHOSPHORUS: CPT

## 2024-02-14 PROCEDURE — 80053 COMPREHEN METABOLIC PANEL: CPT

## 2024-02-14 PROCEDURE — 25010000002 EPOETIN ALFA PER 1000 UNITS: Performed by: NURSE PRACTITIONER

## 2024-02-14 PROCEDURE — 83540 ASSAY OF IRON: CPT

## 2024-02-14 PROCEDURE — 85025 COMPLETE CBC W/AUTO DIFF WBC: CPT

## 2024-02-14 PROCEDURE — 36415 COLL VENOUS BLD VENIPUNCTURE: CPT

## 2024-02-14 PROCEDURE — 82306 VITAMIN D 25 HYDROXY: CPT

## 2024-02-14 PROCEDURE — 84550 ASSAY OF BLOOD/URIC ACID: CPT

## 2024-02-14 PROCEDURE — 81001 URINALYSIS AUTO W/SCOPE: CPT

## 2024-02-14 RX ORDER — CALCIUM CARBONATE/VITAMIN D3 500-10/5ML
LIQUID (ML) ORAL
COMMUNITY

## 2024-02-14 RX ADMIN — ERYTHROPOIETIN 40000 UNITS: 40000 INJECTION, SOLUTION INTRAVENOUS; SUBCUTANEOUS at 11:07

## 2024-02-16 ENCOUNTER — OFFICE VISIT (OUTPATIENT)
Dept: INTERNAL MEDICINE | Facility: CLINIC | Age: 74
End: 2024-02-16
Payer: MEDICARE

## 2024-02-16 VITALS
DIASTOLIC BLOOD PRESSURE: 38 MMHG | HEIGHT: 72 IN | WEIGHT: 148.4 LBS | BODY MASS INDEX: 20.1 KG/M2 | OXYGEN SATURATION: 95 % | SYSTOLIC BLOOD PRESSURE: 136 MMHG | TEMPERATURE: 97.9 F | HEART RATE: 51 BPM

## 2024-02-16 DIAGNOSIS — Z12.5 ENCOUNTER FOR PROSTATE CANCER SCREENING: ICD-10-CM

## 2024-02-16 DIAGNOSIS — E78.2 MIXED HYPERLIPIDEMIA: ICD-10-CM

## 2024-02-16 DIAGNOSIS — Z23 NEED FOR PNEUMOCOCCAL 20-VALENT CONJUGATE VACCINATION: Primary | ICD-10-CM

## 2024-02-16 DIAGNOSIS — E55.9 VITAMIN D DEFICIENCY: ICD-10-CM

## 2024-02-16 RX ORDER — CIPROFLOXACIN AND DEXAMETHASONE 3; 1 MG/ML; MG/ML
4 SUSPENSION/ DROPS AURICULAR (OTIC) 2 TIMES DAILY
Qty: 7.5 ML | Refills: 0 | Status: SHIPPED | OUTPATIENT
Start: 2024-02-16

## 2024-02-16 RX ORDER — LEVOTHYROXINE SODIUM 0.07 MG/1
75 TABLET ORAL DAILY
Qty: 90 TABLET | Refills: 3 | OUTPATIENT
Start: 2024-02-16

## 2024-02-16 RX ORDER — ALBUTEROL SULFATE 90 UG/1
2 AEROSOL, METERED RESPIRATORY (INHALATION) 4 TIMES DAILY PRN
Qty: 20.1 G | Refills: 3 | Status: SHIPPED | OUTPATIENT
Start: 2024-02-16

## 2024-02-16 RX ORDER — LEVOTHYROXINE SODIUM 0.07 MG/1
75 TABLET ORAL DAILY
Qty: 90 TABLET | Refills: 3 | Status: SHIPPED | OUTPATIENT
Start: 2024-02-16

## 2024-02-16 RX ORDER — ERGOCALCIFEROL 1.25 MG/1
50000 CAPSULE ORAL WEEKLY
Qty: 15 CAPSULE | Refills: 3 | Status: SHIPPED | OUTPATIENT
Start: 2024-02-16

## 2024-02-16 NOTE — PROGRESS NOTES
The ABCs of the Annual Wellness Visit  Subsequent Medicare Wellness Visit    Chief Complaint   Patient presents with    Medicare Wellness-subsequent    Hypertension    pneumo vaccine     Ears     B would like you to take a look    Hand Injury     Last week scraped the skin off upper hand.      Subjective    History of Present Illness:  Ricardo Hugo is a 73 y.o. male who presents for a Subsequent Medicare Wellness Visit.    The following portions of the patient's history were reviewed and   updated as appropriate: allergies, current medications, past family history, past medical history, past social history, past surgical history and problem list.    Compared to one year ago, the patient feels his physical   health is the same.    Compared to one year ago, the patient feels his mental   health is the same.    Recent Hospitalizations:  He was not admitted to the hospital during the last year.       Current Medical Providers:  Patient Care Team:  Nathan Zimmer MD as PCP - General (Internal Medicine)  Adrien Brewster MD as Consulting Physician (Gastroenterology)  Eleuterio Farley MD as Cardiologist (Cardiology)  Elena Jon APRN as Referring Physician (Vascular Surgery)  Bolivar Rueda MD as Consulting Physician (Nephrology)  Slava Tate APRN as Nurse Practitioner (Family Medicine)  New Omalley MD as Consulting Physician (Pulmonary Disease)  Becky Camacho APRN as Nurse Practitioner (Hematology and Oncology)  Gil Pineda DO as Consulting Physician (Vascular Surgery)    Outpatient Medications Prior to Visit   Medication Sig Dispense Refill    ALPRAZolam (XANAX) 0.25 MG tablet Take 1 tablet by mouth Every Night.      aspirin (aspirin) 81 MG EC tablet Take  by mouth Daily.      atorvastatin (LIPITOR) 10 MG tablet TAKE 1 TABLET DAILY 90 tablet 3    azelastine (ASTELIN) 0.1 % nasal spray USE 1 SPRAY IN EACH NOSTRIL TWICE A DAY AS NEEDED 90 mL 1     carvedilol (COREG) 25 MG tablet Take 1 tablet by mouth 2 (Two) Times a Day With Meals. 180 tablet 3    cholestyramine (QUESTRAN) 4 g packet MIX AND DRINK 1 PACKET DAILY 90 packet 3    cloNIDine (Catapres) 0.3 MG tablet Take 1 tablet by mouth 3 (Three) Times a Day. 270 tablet 3    cloNIDine (CATAPRES-TTS) 0.1 MG/24HR patch Place 1 patch on the skin as directed by provider 1 (One) Time Per Week.      Copper Gluconate (Copper Caps) 2 MG capsule Take  by mouth.      diphenhydrAMINE HCl, Sleep, (ZzzQuil) 25 MG capsule Take  by mouth Every Night.      fexofenadine (ALLEGRA) 180 MG tablet Take 1 tablet by mouth Daily.      fluticasone (FLONASE) 50 MCG/ACT nasal spray USE 2 SPRAYS INTO THE  NOSTRIL(S) AS DIRECTED BY PROVIDER DAILY AS NEEDED FOR RHINITIS 16 g 3    furosemide (LASIX) 20 MG tablet TAKE 1 TABLET TWICE A  tablet 3    hydrALAZINE (APRESOLINE) 100 MG tablet Take 1 tablet by mouth 3 (Three) Times a Day. 100mg daily      magnesium chloride ER 64 MG DR tablet Take 143 mg by mouth Daily.      Melatonin 10 MG capsule Take 2 capsules by mouth Every Night.      mesalamine (APRISO) 0.375 g 24 hr capsule TAKE 4 CAPSULES DAILY 360 capsule 3    montelukast (SINGULAIR) 10 MG tablet Take 1 tablet by mouth Every Night. 90 tablet 3    Multiple Vitamins-Minerals (PRESERVISION/LUTEIN) capsule Take 1 capsule by mouth 2 (two) times a day.      NIFEdipine CC (ADALAT CC) 90 MG 24 hr tablet Take 1 tablet by mouth Daily. 90 tablet 3    omeprazole (priLOSEC) 20 MG capsule TAKE 1 CAPSULE DAILY 90 capsule 1    Oxymetazoline HCl (Nasal Spray) 0.05 % solution       phenylephrine (MAYELA-SYNEPHRINE) 1 % nasal spray 1 spray into the nostril(s) as directed by provider Daily As Needed (ear bleeding). 1 each 0    potassium chloride 10 MEQ CR tablet TAKE 1 TABLET TWICE A  tablet 3    sodium bicarbonate 650 MG tablet 2 qam, 1 at noon, and 2 qpm 450 tablet 3    valsartan (DIOVAN) 320 MG tablet Take 1 tablet by mouth Daily.      albuterol  sulfate  (90 Base) MCG/ACT inhaler USE 2 INHALATIONS FOUR TIMES A DAY AS NEEDED 20.1 g 3    levothyroxine (Synthroid) 75 MCG tablet Take 1 tablet by mouth Daily. 90 tablet 3    vitamin D (ERGOCALCIFEROL) 1.25 MG (59534 UT) capsule capsule Take 1 capsule by mouth 1 (One) Time Per Week. 15 capsule 3    amoxicillin-clavulanate (AUGMENTIN) 875-125 MG per tablet Take 1 tablet by mouth 2 (Two) Times a Day. 20 tablet 0    COPPER PO Take 2 mg by mouth Daily.       Facility-Administered Medications Prior to Visit   Medication Dose Route Frequency Provider Last Rate Last Admin    cyanocobalamin injection 1,000 mcg  1,000 mcg Intramuscular Q28 Days Ruthie Parra, APRN   1,000 mcg at 08/11/23 1123       No opioid medication identified on active medication list. I have reviewed chart for other potential  high risk medication/s and harmful drug interactions in the elderly.        Aspirin is on active medication list. Aspirin use is indicated based on review of current medical condition/s. Pros and cons of this therapy have been discussed today. Benefits of this medication outweigh potential harm.  Patient has been encouraged to continue taking this medication.  .      Patient Active Problem List   Diagnosis    Chronic rhinitis    Resistant hypertension    Chronic diarrhea    Symptomatic anemia    Wellness examination    PAD (peripheral artery disease)    IHD (ischemic heart disease)    Carotid stenosis    Stenosis of right carotid artery    Carotid stenosis, right    Diastolic dysfunction    CRD (chronic renal disease), stage IV    Anemia due to chronic kidney disease    Copper deficiency    Low iron     CLL (chronic lymphocytic leukemia)    Perforation of left tympanic membrane    Mixed hyperlipidemia    Systolic murmur    Eustachian tube dysfunction, bilateral    Sensorineural hearing loss (SNHL), bilateral    Anticoagulated    Nasal septal deviation    Hypertrophy of inferior nasal turbinate    Unilateral  "recurrent inguinal hernia without obstruction or gangrene    Bilateral inguinal hernia without obstruction or gangrene    Sleep difficulties    Crohn's disease of large intestine without complication     Advance Care Planning  Advance Directive is not on file.  ACP discussion was held with the patient during this visit. Patient has an advance directive (not in EMR), copy requested.       Objective    Vitals:    24 1100   BP: (!) 136/38   BP Location: Left arm   Patient Position: Sitting   Cuff Size: Adult   Pulse: 51   Temp: 97.9 °F (36.6 °C)   TempSrc: Temporal   SpO2: 95%   Weight: 67.3 kg (148 lb 6.4 oz)   Height: 182.9 cm (72.01\")   PainSc: 0-No pain     Estimated body mass index is 20.12 kg/m² as calculated from the following:    Height as of this encounter: 182.9 cm (72.01\").    Weight as of this encounter: 67.3 kg (148 lb 6.4 oz).    BMI is within normal parameters. No other follow-up for BMI required.      Does the patient have evidence of cognitive impairment? No                HEALTH RISK ASSESSMENT    Smoking Status:  Social History     Tobacco Use   Smoking Status Former    Packs/day: 0.50    Years: 25.00    Additional pack years: 0.00    Total pack years: 12.50    Types: Cigarettes    Start date:     Quit date: 10/13/2013    Years since quitting: 10.3    Passive exposure: Past   Smokeless Tobacco Never   Tobacco Comments    quit      Alcohol Consumption:  Social History     Substance and Sexual Activity   Alcohol Use Not Currently     Fall Risk Screen:    STEADI Fall Risk Assessment was completed, and patient is at LOW risk for falls.Assessment completed on:2024    Depression Screenin/16/2024    11:09 AM   PHQ-2/PHQ-9 Depression Screening   Little Interest or Pleasure in Doing Things 0-->not at all   Feeling Down, Depressed or Hopeless 0-->not at all   PHQ-9: Brief Depression Severity Measure Score 0       Health Habits and Functional and Cognitive Screenin/16/2024 "    11:09 AM   Functional & Cognitive Status   Do you have difficulty preparing food and eating? No   Do you have difficulty bathing yourself, getting dressed or grooming yourself? No   Do you have difficulty using the toilet? No   Do you have difficulty moving around from place to place? No   Do you have trouble with steps or getting out of a bed or a chair? No   Current Diet Well Balanced Diet   Dental Exam Up to date   Eye Exam Up to date   Exercise (times per week) 0 times per week   Current Exercises Include No Regular Exercise   Do you need help using the phone?  No   Are you deaf or do you have serious difficulty hearing?  No   Do you need help to go to places out of walking distance? No   Do you need help shopping? No   Do you need help preparing meals?  No   Do you need help with housework?  No   Do you need help with laundry? No   Do you need help taking your medications? No   Do you need help managing money? No   Do you ever drive or ride in a car without wearing a seat belt? No   Have you felt unusual stress, anger or loneliness in the last month? No   Who do you live with? Alone   If you need help, do you have trouble finding someone available to you? No   Have you been bothered in the last four weeks by sexual problems? No   Do you have difficulty concentrating, remembering or making decisions? No       Age-appropriate Screening Schedule:  Refer to the list below for future screening recommendations based on patient's age, sex and/or medical conditions. Orders for these recommended tests are listed in the plan section. The patient has been provided with a written plan.    Health Maintenance   Topic Date Due    TDAP/TD VACCINES (1 - Tdap) Never done    Hepatitis B (1 of 3 - Risk 3-dose series) Never done    COVID-19 Vaccine (7 - 2023-24 season) 11/14/2023    LIPID PANEL  01/27/2024    ANNUAL WELLNESS VISIT  02/14/2024    COLORECTAL CANCER SCREENING  12/08/2026    HEPATITIS C SCREENING  Completed    RSV  Vaccine - Adults  Completed    INFLUENZA VACCINE  Completed    Pneumococcal Vaccine 65+  Completed    AAA SCREEN (ONE-TIME)  Completed    ZOSTER VACCINE  Completed              Assessment & Plan   CMS Preventative Services Quick Reference  Risk Factors Identified During Encounter  Immunizations Discussed/Encouraged: Prevnar 20 (Pneumococcal 20-valent conjugate)  Dental Screening Recommended  Vision Screening Recommended  The above risks/problems have been discussed with the patient.  Follow up actions/plans if indicated are seen below in the Assessment/Plan Section.  Pertinent information has been shared with the patient in the After Visit Summary.    Diagnoses and all orders for this visit:    1. Need for pneumococcal 20-valent conjugate vaccination (Primary)  -     Pneumococcal Conjugate Vaccine 20-Valent All    2. Vitamin D deficiency  -     vitamin D (ERGOCALCIFEROL) 1.25 MG (34609 UT) capsule capsule; Take 1 capsule by mouth 1 (One) Time Per Week.  Dispense: 15 capsule; Refill: 3    3. Mixed hyperlipidemia  -     Lipid Panel  -     TSH Rfx On Abnormal To Free T4    4. Encounter for prostate cancer screening  -     PSA Screen    Other orders  -     levothyroxine (Synthroid) 75 MCG tablet; Take 1 tablet by mouth Daily.  Dispense: 90 tablet; Refill: 3  -     ciprofloxacin-dexAMETHasone (Ciprodex) 0.3-0.1 % otic suspension; Administer 4 drops to the right ear 2 (Two) Times a Day.  Dispense: 7.5 mL; Refill: 0  -     albuterol sulfate  (90 Base) MCG/ACT inhaler; Inhale 2 puffs 4 (Four) Times a Day As Needed for Shortness of Air or Wheezing.  Dispense: 20.1 g; Refill: 3      Recommend at least annual dental and vision screening.  Recommend annual influenza vaccination  Recommend a varied diet and appropriate portion sizes.   CDC recommendations for physical activity:  At least 150 minutes a week (for example, 30 minutes a day, 5 days a week) of moderate-intensity activity such as brisk walking. Or can consider  "75 minutes a week of vigorous-intensity activity such as hiking, jogging, or running.  At least 2 days a week of activities that strengthen muscles.  Plus activities to improve balance.      PCV today       Result Review :           Follow Up:   No follow-ups on file.     An After Visit Summary and PPPS were made available to the patient.                         MAHENDRA Zimmer MD, FACP, FHM      Electronically signed by Nathan Zimmer MD, 02/16/24, 11:23 AM CST.    Additional E&M Note during same encounter follows:  Patient has multiple medical problems which are significant and separately identifiable that require additional work above and beyond the Medicare Wellness Visit.      Chief Complaint  Medicare Wellness-subsequent, Hypertension, pneumo vaccine , Ears (B would like you to take a look), and Hand Injury (Last week scraped the skin off upper hand.)    Subjective        HPI  Ricardo DMUAS Hugo is also being seen today for ear pain  Patient here for the above problems.  See Assessment and Plan for further HPI components.        Objective   Vitals:    02/16/24 1100   BP: (!) 136/38   BP Location: Left arm   Patient Position: Sitting   Cuff Size: Adult   Pulse: 51   Temp: 97.9 °F (36.6 °C)   TempSrc: Temporal   SpO2: 95%   Weight: 67.3 kg (148 lb 6.4 oz)   Height: 182.9 cm (72.01\")   PainSc: 0-No pain     Physical Exam  Vitals and nursing note reviewed.   Constitutional:       Appearance: He is not ill-appearing.   HENT:      Left Ear: Ear canal normal.      Ears:      Comments: Right hear has some mild cerumen impaction, but not completely occluded.  Cannot see orifice of tube in ear.  Looks like it has been displaced and is stuck in ear wax in his ear.  He has some fullness in his ear.  Tried to scrap some ear wax out but could not get it free.   Eyes:      General: No scleral icterus.     Conjunctiva/sclera: Conjunctivae normal.   Pulmonary:      Effort: Pulmonary effort is normal. No respiratory " distress.   Skin:         Neurological:      General: No focal deficit present.      Mental Status: He is alert and oriented to person, place, and time.   Psychiatric:         Mood and Affect: Mood normal.         Behavior: Behavior normal.                             Assessment and Plan   Diagnoses and all orders for this visit:    1. Need for pneumococcal 20-valent conjugate vaccination (Primary)  -     Pneumococcal Conjugate Vaccine 20-Valent All    2. Vitamin D deficiency  -     vitamin D (ERGOCALCIFEROL) 1.25 MG (86159 UT) capsule capsule; Take 1 capsule by mouth 1 (One) Time Per Week.  Dispense: 15 capsule; Refill: 3    3. Mixed hyperlipidemia  -     Lipid Panel  -     TSH Rfx On Abnormal To Free T4    4. Encounter for prostate cancer screening  -     PSA Screen    Other orders  -     levothyroxine (Synthroid) 75 MCG tablet; Take 1 tablet by mouth Daily.  Dispense: 90 tablet; Refill: 3  -     ciprofloxacin-dexAMETHasone (Ciprodex) 0.3-0.1 % otic suspension; Administer 4 drops to the right ear 2 (Two) Times a Day.  Dispense: 7.5 mL; Refill: 0  -     albuterol sulfate  (90 Base) MCG/ACT inhaler; Inhale 2 puffs 4 (Four) Times a Day As Needed for Shortness of Air or Wheezing.  Dispense: 20.1 g; Refill: 3    Right hear has some mild cerumen impaction, but not completely occluded.  Cannot see orifice of tube in ear.  Looks like it has been displaced and is stuck in ear wax in his ear.  He has some fullness in his ear.  Tried to scrap some ear wax out but could not get it free. Will treat with ciprodex.  Reached out to Dr. Chen.  He has upcoming visit with him.  Will monitor for now.    Patient has skin tear on right hand.  Patient used OTC products to stop bleeding.  There is a lot of necrotic tissue at edge and coagulated blood.  Cleaned area with forceps and saline.  Adequate coverage with skin flap.  No signs of infection.  Utilized silver nitrate stick for hemostasis.  Tolerated well.      Labs as  above.  Reviewed labs from renal    Continue current medications.         Follow Up   No follow-ups on file.  Patient was given instructions and counseling regarding his condition or for health maintenance advice. Please see specific information pulled into the AVS if appropriate.

## 2024-02-23 ENCOUNTER — TELEPHONE (OUTPATIENT)
Dept: OTOLARYNGOLOGY | Facility: CLINIC | Age: 74
End: 2024-02-23
Payer: MEDICARE

## 2024-02-23 NOTE — TELEPHONE ENCOUNTER
Patient has appt scheduled for march nurse informed patient there is nothing sooner unless someone cancels and to keep appointment as is since patient is not experiencing any symptoms.

## 2024-02-23 NOTE — TELEPHONE ENCOUNTER
"    Caller: Ricardo Hugo \"Harjinder\"    Relationship to patient: Self    Best call back number: [phone]    Chief complaint: PT'S RIGHT EAR TUBE FELL OUT AND NEEDS TO SCHEDULE APPT TO GET NEW ONE PUT IN    Type of visit: FOLLOW UP/PROCEDURE    Requested date: ASAP     Additional notes:OK TO LEAVE A        "

## 2024-02-25 DIAGNOSIS — E55.9 VITAMIN D DEFICIENCY: ICD-10-CM

## 2024-02-26 RX ORDER — ERGOCALCIFEROL 1.25 MG/1
50000 CAPSULE ORAL WEEKLY
Qty: 15 CAPSULE | Refills: 3 | OUTPATIENT
Start: 2024-02-26

## 2024-02-28 ENCOUNTER — OFFICE VISIT (OUTPATIENT)
Dept: CARDIOLOGY | Facility: CLINIC | Age: 74
End: 2024-02-28
Payer: MEDICARE

## 2024-02-28 ENCOUNTER — LAB (OUTPATIENT)
Dept: LAB | Facility: HOSPITAL | Age: 74
End: 2024-02-28
Payer: MEDICARE

## 2024-02-28 ENCOUNTER — INFUSION (OUTPATIENT)
Dept: ONCOLOGY | Facility: HOSPITAL | Age: 74
End: 2024-02-28
Payer: MEDICARE

## 2024-02-28 VITALS
TEMPERATURE: 97 F | OXYGEN SATURATION: 96 % | BODY MASS INDEX: 20.64 KG/M2 | HEIGHT: 72 IN | DIASTOLIC BLOOD PRESSURE: 44 MMHG | HEART RATE: 53 BPM | WEIGHT: 152.4 LBS | RESPIRATION RATE: 16 BRPM | SYSTOLIC BLOOD PRESSURE: 144 MMHG

## 2024-02-28 VITALS
RESPIRATION RATE: 18 BRPM | OXYGEN SATURATION: 98 % | HEART RATE: 68 BPM | HEIGHT: 72 IN | BODY MASS INDEX: 20.59 KG/M2 | WEIGHT: 152 LBS | DIASTOLIC BLOOD PRESSURE: 84 MMHG | SYSTOLIC BLOOD PRESSURE: 145 MMHG

## 2024-02-28 DIAGNOSIS — N18.4 ANEMIA DUE TO STAGE 4 CHRONIC KIDNEY DISEASE: Primary | ICD-10-CM

## 2024-02-28 DIAGNOSIS — D63.1 ANEMIA DUE TO STAGE 4 CHRONIC KIDNEY DISEASE: ICD-10-CM

## 2024-02-28 DIAGNOSIS — R01.1 SYSTOLIC MURMUR: Chronic | ICD-10-CM

## 2024-02-28 DIAGNOSIS — I73.9 PAD (PERIPHERAL ARTERY DISEASE): ICD-10-CM

## 2024-02-28 DIAGNOSIS — I10 HYPERTENSION, UNSPECIFIED TYPE: ICD-10-CM

## 2024-02-28 DIAGNOSIS — N18.4 ANEMIA DUE TO STAGE 4 CHRONIC KIDNEY DISEASE: ICD-10-CM

## 2024-02-28 DIAGNOSIS — E78.2 MIXED HYPERLIPIDEMIA: ICD-10-CM

## 2024-02-28 DIAGNOSIS — D63.1 ANEMIA DUE TO STAGE 4 CHRONIC KIDNEY DISEASE: Primary | ICD-10-CM

## 2024-02-28 DIAGNOSIS — I25.10 CORONARY ARTERY DISEASE INVOLVING NATIVE CORONARY ARTERY OF NATIVE HEART WITHOUT ANGINA PECTORIS: Primary | ICD-10-CM

## 2024-02-28 DIAGNOSIS — I51.89 DIASTOLIC DYSFUNCTION: ICD-10-CM

## 2024-02-28 LAB
BASOPHILS # BLD AUTO: 0.04 10*3/MM3 (ref 0–0.2)
BASOPHILS NFR BLD AUTO: 0.8 % (ref 0–1.5)
DEPRECATED RDW RBC AUTO: 64.4 FL (ref 37–54)
EOSINOPHIL # BLD AUTO: 0.12 10*3/MM3 (ref 0–0.4)
EOSINOPHIL NFR BLD AUTO: 2.3 % (ref 0.3–6.2)
ERYTHROCYTE [DISTWIDTH] IN BLOOD BY AUTOMATED COUNT: 17 % (ref 12.3–15.4)
HCT VFR BLD AUTO: 29.8 % (ref 37.5–51)
HGB BLD-MCNC: 9.4 G/DL (ref 13–17.7)
IMM GRANULOCYTES # BLD AUTO: 0.03 10*3/MM3 (ref 0–0.05)
IMM GRANULOCYTES NFR BLD AUTO: 0.6 % (ref 0–0.5)
LYMPHOCYTES # BLD AUTO: 1.06 10*3/MM3 (ref 0.7–3.1)
LYMPHOCYTES NFR BLD AUTO: 20.3 % (ref 19.6–45.3)
MCH RBC QN AUTO: 32.5 PG (ref 26.6–33)
MCHC RBC AUTO-ENTMCNC: 31.5 G/DL (ref 31.5–35.7)
MCV RBC AUTO: 103.1 FL (ref 79–97)
MONOCYTES # BLD AUTO: 0.72 10*3/MM3 (ref 0.1–0.9)
MONOCYTES NFR BLD AUTO: 13.8 % (ref 5–12)
NEUTROPHILS NFR BLD AUTO: 3.26 10*3/MM3 (ref 1.7–7)
NEUTROPHILS NFR BLD AUTO: 62.2 % (ref 42.7–76)
NRBC BLD AUTO-RTO: 0 /100 WBC (ref 0–0.2)
PLATELET # BLD AUTO: 124 10*3/MM3 (ref 140–450)
PMV BLD AUTO: 9.7 FL (ref 6–12)
PSA SERPL-MCNC: 0.39 NG/ML (ref 0–4)
RBC # BLD AUTO: 2.89 10*6/MM3 (ref 4.14–5.8)
TSH SERPL DL<=0.05 MIU/L-ACNC: 5.79 UIU/ML (ref 0.27–4.2)
WBC NRBC COR # BLD AUTO: 5.23 10*3/MM3 (ref 3.4–10.8)

## 2024-02-28 PROCEDURE — 85025 COMPLETE CBC W/AUTO DIFF WBC: CPT

## 2024-02-28 PROCEDURE — 96372 THER/PROPH/DIAG INJ SC/IM: CPT

## 2024-02-28 PROCEDURE — 25010000002 EPOETIN ALFA PER 1000 UNITS: Performed by: NURSE PRACTITIONER

## 2024-02-28 PROCEDURE — 84443 ASSAY THYROID STIM HORMONE: CPT | Performed by: INTERNAL MEDICINE

## 2024-02-28 PROCEDURE — G0103 PSA SCREENING: HCPCS | Performed by: INTERNAL MEDICINE

## 2024-02-28 RX ADMIN — ERYTHROPOIETIN 40000 UNITS: 40000 INJECTION, SOLUTION INTRAVENOUS; SUBCUTANEOUS at 09:41

## 2024-02-28 NOTE — PROGRESS NOTES
Subjective:     Encounter Date:02/28/2024      Patient ID: Ricardo Hugo is a 74 y.o. male     Chief Complaint:  History of Present Illness  Patient presents today for routine cardiology follow up. Patient is followed for coronary artery disease. He notes he had 3 vessel CABG in 2003 with Dr. Castro. He also is known to have peripheral artery disease- he follows with Dr. Pineda. He notes he has chronic claudication and poor circulation to his feet and legs. He also has been noted to have diastolic dysfunction, murmur with mitral valve disease and pulmonary hypertension. Patient has difficult to control high blood pressure- which he notes has been running good at home. He is on clonidine patches and clonidine 3 times a day. Patient has coronary artery disease, hypertension, hyperlipidemia, hypothyroidism, peripheral artery disease, carotid artery disease, chronic kidney disease, anxiety, CLL, Iron deficiency anemia, chronic kidney disease. He follows with Dr. Pineda, Becky Camacho, Dr. Rueda and Dr. Zimmer. At last OV he was started on Farxiga but he is no longer taking and is not interested. He denies chest pain or change in shortness of breath.     The following portions of the patient's history were reviewed and updated as appropriate: allergies, current medications, past medical history, past social history, past and problem list.    Allergies   Allergen Reactions    Ondansetron Anaphylaxis and GI Intolerance    Zofran [Ondansetron Hcl] Anaphylaxis    Lortab [Hydrocodone-Acetaminophen] Other (See Comments) and Hallucinations     CLOSTROPHOBIC    Allopurinol Other (See Comments)     Pain on right side       Current Outpatient Medications:     albuterol sulfate  (90 Base) MCG/ACT inhaler, Inhale 2 puffs 4 (Four) Times a Day As Needed for Shortness of Air or Wheezing., Disp: 20.1 g, Rfl: 3    ALPRAZolam (XANAX) 0.25 MG tablet, Take 1 tablet by mouth Every Night., Disp: , Rfl:     aspirin (aspirin) 81  MG EC tablet, Take  by mouth Daily., Disp: , Rfl:     atorvastatin (LIPITOR) 10 MG tablet, TAKE 1 TABLET DAILY, Disp: 90 tablet, Rfl: 3    azelastine (ASTELIN) 0.1 % nasal spray, USE 1 SPRAY IN EACH NOSTRIL TWICE A DAY AS NEEDED, Disp: 90 mL, Rfl: 1    carvedilol (COREG) 25 MG tablet, Take 1 tablet by mouth 2 (Two) Times a Day With Meals., Disp: 180 tablet, Rfl: 3    cholestyramine (QUESTRAN) 4 g packet, MIX AND DRINK 1 PACKET DAILY, Disp: 90 packet, Rfl: 3    ciprofloxacin-dexAMETHasone (Ciprodex) 0.3-0.1 % otic suspension, Administer 4 drops to the right ear 2 (Two) Times a Day., Disp: 7.5 mL, Rfl: 0    cloNIDine (Catapres) 0.3 MG tablet, Take 1 tablet by mouth 3 (Three) Times a Day., Disp: 270 tablet, Rfl: 3    cloNIDine (CATAPRES-TTS) 0.1 MG/24HR patch, Place 1 patch on the skin as directed by provider 1 (One) Time Per Week., Disp: , Rfl:     diphenhydrAMINE HCl, Sleep, (ZzzQuil) 25 MG capsule, Take  by mouth Every Night., Disp: , Rfl:     fexofenadine (ALLEGRA) 180 MG tablet, Take 1 tablet by mouth Daily., Disp: , Rfl:     fluticasone (FLONASE) 50 MCG/ACT nasal spray, USE 2 SPRAYS INTO THE  NOSTRIL(S) AS DIRECTED BY PROVIDER DAILY AS NEEDED FOR RHINITIS, Disp: 16 g, Rfl: 3    furosemide (LASIX) 20 MG tablet, TAKE 1 TABLET TWICE A DAY, Disp: 180 tablet, Rfl: 3    hydrALAZINE (APRESOLINE) 100 MG tablet, Take 1 tablet by mouth 3 (Three) Times a Day. 100mg daily, Disp: , Rfl:     levothyroxine (Synthroid) 75 MCG tablet, Take 1 tablet by mouth Daily., Disp: 90 tablet, Rfl: 3    magnesium chloride ER 64 MG DR tablet, Take 143 mg by mouth Daily., Disp: , Rfl:     Melatonin 10 MG capsule, Take 2 capsules by mouth Every Night., Disp: , Rfl:     mesalamine (APRISO) 0.375 g 24 hr capsule, TAKE 4 CAPSULES DAILY, Disp: 360 capsule, Rfl: 3    montelukast (SINGULAIR) 10 MG tablet, Take 1 tablet by mouth Every Night., Disp: 90 tablet, Rfl: 3    Multiple Vitamins-Minerals (PRESERVISION/LUTEIN) capsule, Take 1 capsule by mouth 2  (two) times a day., Disp: , Rfl:     NIFEdipine CC (ADALAT CC) 90 MG 24 hr tablet, Take 1 tablet by mouth Daily., Disp: 90 tablet, Rfl: 3    omeprazole (priLOSEC) 20 MG capsule, TAKE 1 CAPSULE DAILY, Disp: 90 capsule, Rfl: 1    Oxymetazoline HCl (Nasal Spray) 0.05 % solution, , Disp: , Rfl:     phenylephrine (MAYELA-SYNEPHRINE) 1 % nasal spray, 1 spray into the nostril(s) as directed by provider Daily As Needed (ear bleeding)., Disp: 1 each, Rfl: 0    potassium chloride 10 MEQ CR tablet, TAKE 1 TABLET TWICE A DAY, Disp: 180 tablet, Rfl: 3    sodium bicarbonate 650 MG tablet, 2 qam, 1 at noon, and 2 qpm, Disp: 450 tablet, Rfl: 3    valsartan (DIOVAN) 320 MG tablet, Take 1 tablet by mouth Daily., Disp: , Rfl:     vitamin D (ERGOCALCIFEROL) 1.25 MG (85224 UT) capsule capsule, Take 1 capsule by mouth 1 (One) Time Per Week., Disp: 15 capsule, Rfl: 3    Current Facility-Administered Medications:     cyanocobalamin injection 1,000 mcg, 1,000 mcg, Intramuscular, Q28 Days, Ruthie Parra C, APRN, 1,000 mcg at 08/11/23 1123    Social History     Socioeconomic History    Marital status:    Tobacco Use    Smoking status: Former     Packs/day: 0.50     Years: 25.00     Additional pack years: 0.00     Total pack years: 12.50     Types: Cigarettes     Start date: 1988     Quit date: 10/13/2013     Years since quitting: 10.3     Passive exposure: Past    Smokeless tobacco: Never    Tobacco comments:     quit 2013   Vaping Use    Vaping Use: Never used   Substance and Sexual Activity    Alcohol use: Not Currently    Drug use: No    Sexual activity: Not Currently     Partners: Female       Review of Systems   Constitutional: Positive for malaise/fatigue. Negative for chills, decreased appetite, fever, weight gain and weight loss.   HENT:  Negative for nosebleeds.    Eyes:  Negative for visual disturbance.   Cardiovascular:  Positive for claudication. Negative for chest pain, dyspnea on exertion, leg swelling, near-syncope,  "orthopnea, palpitations, paroxysmal nocturnal dyspnea and syncope.   Respiratory:  Positive for shortness of breath. Negative for cough, hemoptysis and snoring.    Endocrine: Negative for cold intolerance and heat intolerance.   Hematologic/Lymphatic: Negative for bleeding problem. Does not bruise/bleed easily.   Skin:  Negative for rash.   Musculoskeletal:  Positive for joint pain. Negative for back pain and falls.   Gastrointestinal:  Negative for abdominal pain, constipation, diarrhea, heartburn, melena, nausea and vomiting.   Genitourinary:  Negative for hematuria.   Neurological:  Negative for dizziness, headaches and light-headedness.   Psychiatric/Behavioral:  Negative for altered mental status.    Allergic/Immunologic: Negative for persistent infections.              Objective:     Constitutional:       Appearance: Well-developed and not in distress. Frail. Chronically ill-appearing.   Eyes:      Pupils: Pupils are equal, round, and reactive to light.   HENT:      Head: Normocephalic and atraumatic.   Neck:      Vascular: No carotid bruit or JVD.   Pulmonary:      Effort: Pulmonary effort is normal.      Breath sounds: Normal breath sounds.   Cardiovascular:      Normal rate. Regular rhythm.      Murmurs: There is a systolic murmur.   Pulses:     Intact distal pulses.   Edema:     Peripheral edema absent.   Abdominal:      General: Bowel sounds are normal.      Palpations: Abdomen is soft.   Musculoskeletal: Normal range of motion.      Cervical back: Normal range of motion and neck supple. Skin:     General: Skin is warm and dry.   Neurological:      Mental Status: Alert and oriented to person, place, and time.      Deep Tendon Reflexes: Reflexes are normal and symmetric.   Psychiatric:         Behavior: Behavior normal.         Thought Content: Thought content normal.         Judgment: Judgment normal.           Procedures  /84   Pulse 68   Resp 18   Ht 182.9 cm (72\")   Wt 68.9 kg (152 lb)   " SpO2 98%   BMI 20.61 kg/m²   Lab Review:   I have reviewed       Lab Results   Component Value Date    CHOL 59 10/29/2021    CHLPL 101 01/27/2023    TRIG 80 01/27/2023    HDL 40 01/27/2023    LDL 45 01/27/2023      Results for orders placed during the hospital encounter of 10/05/22    Adult Transthoracic Echo Complete w/ Color, Spectral and Contrast if necessary per protocol    Interpretation Summary  · Left ventricular wall thickness is consistent with mild concentric hypertrophy. Sigmoid-shaped ventricular septum is present.  · Left ventricular ejection fraction appears to be 61 - 65%.  · Left ventricular diastolic function is consistent with (grade II w/high LAP) pseudonormalization.  · There are myxomatous changes of the mitral valve apparatus present with mild mitral annular calcification. There is mild mitral regurgitation.  · The left atrial cavity is moderately dilated.  · The right ventricle is mildly dilated with normal systolic function.  · The right atrial cavity is borderline dilated.  · Mild to moderate tricuspid valve regurgitation.  · Mild pulmonary hypertension with estimated right ventricular systolic pressure 45 mmHg.     Assessment:          Diagnosis Plan   1. Coronary artery disease involving native coronary artery of native heart without angina pectoris        2. Diastolic dysfunction        3. Systolic murmur        4. PAD (peripheral artery disease)        5. Mixed hyperlipidemia        6. Hypertension, unspecified type               Plan:       Coronary Artery Disease- with CABG in 2003- he reports 3 vessel with Dr. Castro. No overt angina. On Aspirin and Lipitor. Last LDL 45.   Diastolic Dysfunction- on Lasix. Attempted to start Farxiga at last OV but no longer on. Low Salt Diet.   Systolic Murmur- last echo with mitral disease. Will monitor  Peripheral Artery Disease- follows with Dr. Pineda. Chronic claudication. Carotid artery disease  Mixed Hyperlipidemia- cholesterol well  controlled on Lipitor  Hypertension- has had issues with controlling. On Clonidine patch and Clonidine 3 times a day. Reports good control. Elevated today in office. Recent check with PCP good control. Continue to monitor.              Follow up in 1 year or sooner if needed

## 2024-03-07 RX ORDER — FLUTICASONE PROPIONATE 50 MCG
SPRAY, SUSPENSION (ML) NASAL
Qty: 48 G | Refills: 3 | Status: SHIPPED | OUTPATIENT
Start: 2024-03-07

## 2024-03-12 ENCOUNTER — TELEPHONE (OUTPATIENT)
Dept: OTOLARYNGOLOGY | Facility: CLINIC | Age: 74
End: 2024-03-12
Payer: MEDICARE

## 2024-03-12 RX ORDER — LEVOTHYROXINE SODIUM 75 MCG
75 TABLET ORAL DAILY
Qty: 90 TABLET | Refills: 3 | OUTPATIENT
Start: 2024-03-12

## 2024-03-12 NOTE — TELEPHONE ENCOUNTER
Caller: CHARLY    Relationship to patient: SELF    Best call back number: [phone]    Patient is needing: PT HAS APPT ON 3/27/24. PT REQUESTS TUBE BE PLACED IN RIGHT EAR DURING THIS APPT.

## 2024-03-13 ENCOUNTER — INFUSION (OUTPATIENT)
Dept: ONCOLOGY | Facility: HOSPITAL | Age: 74
End: 2024-03-13
Payer: MEDICARE

## 2024-03-13 ENCOUNTER — LAB (OUTPATIENT)
Dept: LAB | Facility: HOSPITAL | Age: 74
End: 2024-03-13
Payer: MEDICARE

## 2024-03-13 VITALS
HEIGHT: 72 IN | RESPIRATION RATE: 18 BRPM | HEART RATE: 53 BPM | SYSTOLIC BLOOD PRESSURE: 132 MMHG | OXYGEN SATURATION: 98 % | DIASTOLIC BLOOD PRESSURE: 38 MMHG | BODY MASS INDEX: 20.21 KG/M2 | WEIGHT: 149.2 LBS | TEMPERATURE: 97.6 F

## 2024-03-13 DIAGNOSIS — D63.1 ANEMIA DUE TO STAGE 4 CHRONIC KIDNEY DISEASE: Primary | ICD-10-CM

## 2024-03-13 DIAGNOSIS — N18.4 ANEMIA DUE TO STAGE 4 CHRONIC KIDNEY DISEASE: Primary | ICD-10-CM

## 2024-03-13 LAB
BASOPHILS # BLD AUTO: 0.05 10*3/MM3 (ref 0–0.2)
BASOPHILS NFR BLD AUTO: 1.1 % (ref 0–1.5)
DEPRECATED RDW RBC AUTO: 60.2 FL (ref 37–54)
EOSINOPHIL # BLD AUTO: 0.18 10*3/MM3 (ref 0–0.4)
EOSINOPHIL NFR BLD AUTO: 3.8 % (ref 0.3–6.2)
ERYTHROCYTE [DISTWIDTH] IN BLOOD BY AUTOMATED COUNT: 15.8 % (ref 12.3–15.4)
HCT VFR BLD AUTO: 31.9 % (ref 37.5–51)
HGB BLD-MCNC: 10 G/DL (ref 13–17.7)
HOLD SPECIMEN: NORMAL
IMM GRANULOCYTES # BLD AUTO: 0.03 10*3/MM3 (ref 0–0.05)
IMM GRANULOCYTES NFR BLD AUTO: 0.6 % (ref 0–0.5)
LYMPHOCYTES # BLD AUTO: 0.94 10*3/MM3 (ref 0.7–3.1)
LYMPHOCYTES NFR BLD AUTO: 19.8 % (ref 19.6–45.3)
MCH RBC QN AUTO: 32.5 PG (ref 26.6–33)
MCHC RBC AUTO-ENTMCNC: 31.3 G/DL (ref 31.5–35.7)
MCV RBC AUTO: 103.6 FL (ref 79–97)
MONOCYTES # BLD AUTO: 0.64 10*3/MM3 (ref 0.1–0.9)
MONOCYTES NFR BLD AUTO: 13.5 % (ref 5–12)
NEUTROPHILS NFR BLD AUTO: 2.91 10*3/MM3 (ref 1.7–7)
NEUTROPHILS NFR BLD AUTO: 61.2 % (ref 42.7–76)
NRBC BLD AUTO-RTO: 0 /100 WBC (ref 0–0.2)
PLATELET # BLD AUTO: 117 10*3/MM3 (ref 140–450)
PMV BLD AUTO: 10.1 FL (ref 6–12)
RBC # BLD AUTO: 3.08 10*6/MM3 (ref 4.14–5.8)
WBC NRBC COR # BLD AUTO: 4.75 10*3/MM3 (ref 3.4–10.8)

## 2024-03-13 PROCEDURE — 25010000002 EPOETIN ALFA PER 1000 UNITS: Performed by: NURSE PRACTITIONER

## 2024-03-13 PROCEDURE — 36415 COLL VENOUS BLD VENIPUNCTURE: CPT

## 2024-03-13 PROCEDURE — 96372 THER/PROPH/DIAG INJ SC/IM: CPT

## 2024-03-13 PROCEDURE — 85025 COMPLETE CBC W/AUTO DIFF WBC: CPT

## 2024-03-13 RX ADMIN — ERYTHROPOIETIN 40000 UNITS: 40000 INJECTION, SOLUTION INTRAVENOUS; SUBCUTANEOUS at 10:11

## 2024-03-27 ENCOUNTER — LAB (OUTPATIENT)
Dept: LAB | Facility: HOSPITAL | Age: 74
End: 2024-03-27
Payer: MEDICARE

## 2024-03-27 ENCOUNTER — INFUSION (OUTPATIENT)
Dept: ONCOLOGY | Facility: HOSPITAL | Age: 74
End: 2024-03-27
Payer: MEDICARE

## 2024-03-27 ENCOUNTER — OFFICE VISIT (OUTPATIENT)
Dept: OTOLARYNGOLOGY | Facility: CLINIC | Age: 74
End: 2024-03-27
Payer: MEDICARE

## 2024-03-27 VITALS — TEMPERATURE: 97.6 F | HEIGHT: 72 IN | WEIGHT: 148 LBS | BODY MASS INDEX: 20.05 KG/M2

## 2024-03-27 VITALS
TEMPERATURE: 97.6 F | OXYGEN SATURATION: 97 % | RESPIRATION RATE: 18 BRPM | HEART RATE: 48 BPM | DIASTOLIC BLOOD PRESSURE: 39 MMHG | SYSTOLIC BLOOD PRESSURE: 115 MMHG

## 2024-03-27 DIAGNOSIS — N18.4 ANEMIA DUE TO STAGE 4 CHRONIC KIDNEY DISEASE: Primary | ICD-10-CM

## 2024-03-27 DIAGNOSIS — D63.1 ANEMIA DUE TO STAGE 4 CHRONIC KIDNEY DISEASE: Primary | ICD-10-CM

## 2024-03-27 DIAGNOSIS — Z96.22 S/P MYRINGOTOMY WITH INSERTION OF TUBE: ICD-10-CM

## 2024-03-27 DIAGNOSIS — H72.92 PERFORATION OF LEFT TYMPANIC MEMBRANE: Chronic | ICD-10-CM

## 2024-03-27 DIAGNOSIS — H69.93 DYSFUNCTION OF BOTH EUSTACHIAN TUBES: Primary | Chronic | ICD-10-CM

## 2024-03-27 DIAGNOSIS — H90.6 MIXED CONDUCTIVE AND SENSORINEURAL HEARING LOSS OF BOTH EARS: ICD-10-CM

## 2024-03-27 DIAGNOSIS — D63.1 ANEMIA DUE TO STAGE 4 CHRONIC KIDNEY DISEASE: ICD-10-CM

## 2024-03-27 DIAGNOSIS — N18.4 ANEMIA DUE TO STAGE 4 CHRONIC KIDNEY DISEASE: ICD-10-CM

## 2024-03-27 LAB
BASOPHILS # BLD AUTO: 0.05 10*3/MM3 (ref 0–0.2)
BASOPHILS NFR BLD AUTO: 1 % (ref 0–1.5)
DEPRECATED RDW RBC AUTO: 54 FL (ref 37–54)
EOSINOPHIL # BLD AUTO: 0.19 10*3/MM3 (ref 0–0.4)
EOSINOPHIL NFR BLD AUTO: 3.7 % (ref 0.3–6.2)
ERYTHROCYTE [DISTWIDTH] IN BLOOD BY AUTOMATED COUNT: 14.6 % (ref 12.3–15.4)
HCT VFR BLD AUTO: 32.8 % (ref 37.5–51)
HGB BLD-MCNC: 10.4 G/DL (ref 13–17.7)
IMM GRANULOCYTES # BLD AUTO: 0.02 10*3/MM3 (ref 0–0.05)
IMM GRANULOCYTES NFR BLD AUTO: 0.4 % (ref 0–0.5)
LYMPHOCYTES # BLD AUTO: 0.99 10*3/MM3 (ref 0.7–3.1)
LYMPHOCYTES NFR BLD AUTO: 19.3 % (ref 19.6–45.3)
MCH RBC QN AUTO: 32.1 PG (ref 26.6–33)
MCHC RBC AUTO-ENTMCNC: 31.7 G/DL (ref 31.5–35.7)
MCV RBC AUTO: 101.2 FL (ref 79–97)
MONOCYTES # BLD AUTO: 0.59 10*3/MM3 (ref 0.1–0.9)
MONOCYTES NFR BLD AUTO: 11.5 % (ref 5–12)
NEUTROPHILS NFR BLD AUTO: 3.28 10*3/MM3 (ref 1.7–7)
NEUTROPHILS NFR BLD AUTO: 64.1 % (ref 42.7–76)
NRBC BLD AUTO-RTO: 0 /100 WBC (ref 0–0.2)
PLATELET # BLD AUTO: 101 10*3/MM3 (ref 140–450)
PMV BLD AUTO: 9.7 FL (ref 6–12)
RBC # BLD AUTO: 3.24 10*6/MM3 (ref 4.14–5.8)
WBC NRBC COR # BLD AUTO: 5.12 10*3/MM3 (ref 3.4–10.8)

## 2024-03-27 PROCEDURE — 25010000002 EPOETIN ALFA PER 1000 UNITS: Performed by: NURSE PRACTITIONER

## 2024-03-27 PROCEDURE — 96372 THER/PROPH/DIAG INJ SC/IM: CPT

## 2024-03-27 PROCEDURE — 85025 COMPLETE CBC W/AUTO DIFF WBC: CPT

## 2024-03-27 PROCEDURE — 36415 COLL VENOUS BLD VENIPUNCTURE: CPT

## 2024-03-27 RX ADMIN — ERYTHROPOIETIN 40000 UNITS: 40000 INJECTION, SOLUTION INTRAVENOUS; SUBCUTANEOUS at 08:29

## 2024-03-27 NOTE — PROGRESS NOTES
Nathan Chen MD  [facility] ENT [facility] EAR NOSE & THROAT  [address], [address]  [place] [address]  Fax [phone]  Phone [phone]      Visit Type: FOLLOW UP   Chief Complaint   Patient presents with    Ear Problem           History of Present Illness  The patient has been followed for many years with chronic eustachian tube dysfunction and is status post tube placement. He has had a large perforation on the left-hand side after extrusion. He is having difficulty with right-sided symptoms currently.    He feels better with the tube in.                     Vital Signs:   Temp:  [97.6 °F (36.4 °C)] 97.6 °F (36.4 °C)  Heart Rate:  [48] 48  Resp:  [18] 18  BP: (115)/(39) 115/39  Physical Exam    ENT Physical Exam  Constitutional  Appearance: patient appears well-developed and well-nourished,  Communication/Voice: communication appropriate for developmental age; vocal quality normal;  Head and Face  Appearance: head appears normal, face appears normal and face appears atraumatic;  Palpation: facial palpation normal;  Salivary: glands normal;  Ear  Hearing: intact;  Auricles: right auricle normal; left auricle normal;  External Mastoids: right external mastoid normal; left external mastoid normal;  Ear Canals: left ear canal no drainage observed;  Tympanic Membranes: right tympanic membrane with effusion; tympanostomy tube (Extruded against the eardrum) noted; left tympanic membrane perforated; central perforation inferior; perforation size: 40%;  Nose  External Nose: nares patent bilaterally; external nose normal;  Oral Cavity/Oropharynx  Lips: normal;  Neck  Neck: neck normal;  Respiratory  Inspection: breathing unlabored; normal breathing rate;  Cardiovascular  Inspection: extremities are warm and well perfused; no peripheral edema present;  Neurovestibular  Mental Status: alert and oriented;  Psychiatric: mood normal; affect is appropriate;        Left Myringotomy Tube Insertion    Date/Time: 3/27/2024 10:11 AM    Performed by: Nathan Chen MD  Authorized by: Nathan Chen MD    Informed Consent:   Consent for the procedure was given by the patient. The risks and benefits of the procedure were discussed, including but not limited to tympanic membrane perforation, early or late tube extrusion, aural fullness, otorrhea and hearing loss/ tinnitus. Alternatives were discussed, including alternative treatments and observation. After the discussion of the risks and benefits, questions were allowed and answered until understanding was demonstrated. Consent to perform the procedure was obtained by written consent consent.     Anesthesia (see MAR for exact dosages):     Local anesthetic:     Total CC used: 0.1Local anesthetic: phenol.    Indications:  Left myringotomy tube insertion was felt to be indicated for eustachian tube dysfunction and otalgia.     Procedure:  The ear canal and tympanic membrane were visualized with the otologic microscope. Examination of the ear canal revealed left sided cerumen and a left foreign body.  Removal of the cerumen was performed using a curette. The foreign object was a extruded tube. The foreign body was removed with a alligator forceps. A left myringotomy tube insertion was performed. After anesthesia, an incision was made in the left posterior inferior tympanic membrane. The middle ear was inspected and noted to have normal mucosa. There was noted to be no difficulty placing the tube. The procedure was tolerated well with no immediate complications.      Result Review       RESULTS REVIEW    Results             Assessment & Plan    Assessment & Plan  1. 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  I will go ahead and get the tube out and put the new tube in.     Orders Placed This  Encounter   Procedures    $ Myringotomy           No follow-ups on file.        Patient or patient representative verbalized consent for the use of Ambient Listening during the visit with  Nathan Chen MD for chart documentation. 3/27/2024  10:04 CDT  Nathan Chen MD

## 2024-04-08 DIAGNOSIS — N18.4 CKD (CHRONIC KIDNEY DISEASE) STAGE 4, GFR 15-29 ML/MIN: ICD-10-CM

## 2024-04-08 RX ORDER — MONTELUKAST SODIUM 10 MG/1
10 TABLET ORAL NIGHTLY
Qty: 90 TABLET | Refills: 3 | OUTPATIENT
Start: 2024-04-08

## 2024-04-08 RX ORDER — CLONIDINE 0.1 MG/24H
1 PATCH, EXTENDED RELEASE TRANSDERMAL WEEKLY
Qty: 12 PATCH | Refills: 3 | OUTPATIENT
Start: 2024-04-08

## 2024-04-08 RX ORDER — FUROSEMIDE 20 MG/1
20 TABLET ORAL 2 TIMES DAILY
Qty: 180 TABLET | Refills: 3 | OUTPATIENT
Start: 2024-04-08

## 2024-04-08 RX ORDER — VALSARTAN 320 MG/1
320 TABLET ORAL DAILY
Qty: 90 TABLET | Refills: 3
Start: 2024-04-08

## 2024-04-08 NOTE — TELEPHONE ENCOUNTER
Has a years worth of medications at [facility], including clonidine patch which we don't prescribe.

## 2024-04-10 ENCOUNTER — INFUSION (OUTPATIENT)
Dept: ONCOLOGY | Facility: HOSPITAL | Age: 74
End: 2024-04-10
Payer: MEDICARE

## 2024-04-10 ENCOUNTER — LAB (OUTPATIENT)
Dept: LAB | Facility: HOSPITAL | Age: 74
End: 2024-04-10
Payer: MEDICARE

## 2024-04-10 VITALS
OXYGEN SATURATION: 96 % | RESPIRATION RATE: 20 BRPM | WEIGHT: 154.8 LBS | SYSTOLIC BLOOD PRESSURE: 125 MMHG | HEIGHT: 72 IN | TEMPERATURE: 97.8 F | DIASTOLIC BLOOD PRESSURE: 55 MMHG | HEART RATE: 52 BPM | BODY MASS INDEX: 20.97 KG/M2

## 2024-04-10 DIAGNOSIS — N18.4 ANEMIA DUE TO STAGE 4 CHRONIC KIDNEY DISEASE: Primary | ICD-10-CM

## 2024-04-10 DIAGNOSIS — D63.1 ANEMIA DUE TO STAGE 4 CHRONIC KIDNEY DISEASE: Primary | ICD-10-CM

## 2024-04-10 DIAGNOSIS — N18.4 ANEMIA DUE TO STAGE 4 CHRONIC KIDNEY DISEASE: ICD-10-CM

## 2024-04-10 DIAGNOSIS — D63.1 ANEMIA DUE TO STAGE 4 CHRONIC KIDNEY DISEASE: ICD-10-CM

## 2024-04-10 LAB
DEPRECATED RDW RBC AUTO: 54.4 FL (ref 37–54)
ERYTHROCYTE [DISTWIDTH] IN BLOOD BY AUTOMATED COUNT: 14.3 % (ref 12.3–15.4)
HCT VFR BLD AUTO: 34.3 % (ref 37.5–51)
HGB BLD-MCNC: 10.6 G/DL (ref 13–17.7)
MCH RBC QN AUTO: 31.9 PG (ref 26.6–33)
MCHC RBC AUTO-ENTMCNC: 30.9 G/DL (ref 31.5–35.7)
MCV RBC AUTO: 103.3 FL (ref 79–97)
PLATELET # BLD AUTO: 100 10*3/MM3 (ref 140–450)
PMV BLD AUTO: 10.5 FL (ref 6–12)
RBC # BLD AUTO: 3.32 10*6/MM3 (ref 4.14–5.8)
WBC NRBC COR # BLD AUTO: 5.71 10*3/MM3 (ref 3.4–10.8)

## 2024-04-10 PROCEDURE — 36415 COLL VENOUS BLD VENIPUNCTURE: CPT

## 2024-04-10 PROCEDURE — 85027 COMPLETE CBC AUTOMATED: CPT

## 2024-04-10 PROCEDURE — 96372 THER/PROPH/DIAG INJ SC/IM: CPT

## 2024-04-10 PROCEDURE — 25010000002 EPOETIN ALFA-EPBX 40000 UNIT/ML SOLUTION: Performed by: NURSE PRACTITIONER

## 2024-04-10 RX ADMIN — EPOETIN ALFA-EPBX 40000 UNITS: 40000 INJECTION, SOLUTION INTRAVENOUS; SUBCUTANEOUS at 09:06

## 2024-04-13 DIAGNOSIS — N18.4 CKD (CHRONIC KIDNEY DISEASE) STAGE 4, GFR 15-29 ML/MIN: ICD-10-CM

## 2024-04-15 DIAGNOSIS — N18.4 CKD (CHRONIC KIDNEY DISEASE) STAGE 4, GFR 15-29 ML/MIN: ICD-10-CM

## 2024-04-15 RX ORDER — FUROSEMIDE 20 MG/1
TABLET ORAL
Qty: 180 TABLET | Refills: 3 | Status: SHIPPED | OUTPATIENT
Start: 2024-04-15

## 2024-04-15 RX ORDER — FUROSEMIDE 20 MG/1
20 TABLET ORAL 2 TIMES DAILY
Qty: 180 TABLET | Refills: 3 | Status: SHIPPED | OUTPATIENT
Start: 2024-04-15

## 2024-04-15 RX ORDER — FUROSEMIDE 20 MG/1
20 TABLET ORAL 2 TIMES DAILY
Qty: 60 TABLET | Refills: 0 | Status: SHIPPED | OUTPATIENT
Start: 2024-04-15

## 2024-04-24 ENCOUNTER — INFUSION (OUTPATIENT)
Dept: ONCOLOGY | Facility: HOSPITAL | Age: 74
End: 2024-04-24
Payer: MEDICARE

## 2024-04-24 ENCOUNTER — LAB (OUTPATIENT)
Dept: LAB | Facility: HOSPITAL | Age: 74
End: 2024-04-24
Payer: MEDICARE

## 2024-04-24 VITALS
HEIGHT: 72 IN | OXYGEN SATURATION: 96 % | TEMPERATURE: 97.8 F | RESPIRATION RATE: 18 BRPM | HEART RATE: 85 BPM | DIASTOLIC BLOOD PRESSURE: 46 MMHG | WEIGHT: 155 LBS | SYSTOLIC BLOOD PRESSURE: 145 MMHG | BODY MASS INDEX: 20.99 KG/M2

## 2024-04-24 DIAGNOSIS — D63.1 ANEMIA DUE TO STAGE 4 CHRONIC KIDNEY DISEASE: Primary | ICD-10-CM

## 2024-04-24 DIAGNOSIS — N18.4 ANEMIA DUE TO STAGE 4 CHRONIC KIDNEY DISEASE: Primary | ICD-10-CM

## 2024-04-24 LAB
DEPRECATED RDW RBC AUTO: 50.9 FL (ref 37–54)
ERYTHROCYTE [DISTWIDTH] IN BLOOD BY AUTOMATED COUNT: 14 % (ref 12.3–15.4)
HCT VFR BLD AUTO: 34.8 % (ref 37.5–51)
HGB BLD-MCNC: 10.9 G/DL (ref 13–17.7)
HOLD SPECIMEN: NORMAL
MCH RBC QN AUTO: 31.3 PG (ref 26.6–33)
MCHC RBC AUTO-ENTMCNC: 31.3 G/DL (ref 31.5–35.7)
MCV RBC AUTO: 100 FL (ref 79–97)
PLATELET # BLD AUTO: 101 10*3/MM3 (ref 140–450)
PMV BLD AUTO: 10.4 FL (ref 6–12)
RBC # BLD AUTO: 3.48 10*6/MM3 (ref 4.14–5.8)
WBC NRBC COR # BLD AUTO: 4.85 10*3/MM3 (ref 3.4–10.8)

## 2024-04-24 PROCEDURE — 25010000002 EPOETIN ALFA-EPBX 40000 UNIT/ML SOLUTION: Performed by: NURSE PRACTITIONER

## 2024-04-24 PROCEDURE — 36415 COLL VENOUS BLD VENIPUNCTURE: CPT

## 2024-04-24 PROCEDURE — 85027 COMPLETE CBC AUTOMATED: CPT

## 2024-04-24 PROCEDURE — 96372 THER/PROPH/DIAG INJ SC/IM: CPT

## 2024-04-24 RX ADMIN — EPOETIN ALFA-EPBX 40000 UNITS: 40000 INJECTION, SOLUTION INTRAVENOUS; SUBCUTANEOUS at 08:48

## 2024-04-26 RX ORDER — ALPRAZOLAM 0.25 MG/1
0.25 TABLET ORAL NIGHTLY PRN
Qty: 90 TABLET | OUTPATIENT
Start: 2024-04-26

## 2024-04-30 DIAGNOSIS — I25.9 IHD (ISCHEMIC HEART DISEASE): ICD-10-CM

## 2024-04-30 DIAGNOSIS — I51.89 DIASTOLIC DYSFUNCTION: ICD-10-CM

## 2024-04-30 DIAGNOSIS — I1A.0 RESISTANT HYPERTENSION: ICD-10-CM

## 2024-04-30 RX ORDER — CARVEDILOL 25 MG/1
25 TABLET ORAL 2 TIMES DAILY WITH MEALS
Qty: 180 TABLET | Refills: 3 | Status: CANCELLED | OUTPATIENT
Start: 2024-04-30

## 2024-04-30 RX ORDER — ALPRAZOLAM 0.25 MG/1
0.25 TABLET ORAL NIGHTLY PRN
Qty: 90 TABLET | OUTPATIENT
Start: 2024-04-30

## 2024-04-30 RX ORDER — ALPRAZOLAM 0.25 MG/1
0.25 TABLET ORAL NIGHTLY
OUTPATIENT
Start: 2024-04-30

## 2024-04-30 NOTE — TELEPHONE ENCOUNTER
Carvedilol was sent to [facility] on 11/16/23 for a yr supply, so will remove from this request.    Last OV 2/16 w/Dr. Zimmer  Next OV 5/31 w/Dr. Zimmer  UTPITER on UDS/CSA

## 2024-05-01 RX ORDER — ALPRAZOLAM 0.25 MG/1
0.25 TABLET ORAL NIGHTLY
Qty: 90 TABLET | Refills: 1 | Status: SHIPPED | OUTPATIENT
Start: 2024-05-01

## 2024-05-01 RX ORDER — LEVOTHYROXINE SODIUM 0.07 MG/1
75 TABLET ORAL DAILY
Qty: 90 TABLET | Refills: 3 | Status: SHIPPED | OUTPATIENT
Start: 2024-05-01

## 2024-05-01 RX ORDER — CLONIDINE HYDROCHLORIDE 0.3 MG/1
0.3 TABLET ORAL 3 TIMES DAILY
Qty: 270 TABLET | Refills: 3 | Status: SHIPPED | OUTPATIENT
Start: 2024-05-01

## 2024-05-06 ENCOUNTER — TELEPHONE (OUTPATIENT)
Dept: INTERNAL MEDICINE | Facility: CLINIC | Age: 74
End: 2024-05-06

## 2024-05-06 NOTE — TELEPHONE ENCOUNTER
"  Caller: Ricardo Hugo \"Harjinder\"    Relationship: Self    Best call back number: [phone]     What is the best time to reach you: ANYTIME    Who are you requesting to speak with (clinical staff, provider,  specific staff member): JAY FAITH    What was the call regarding: PATIENT STATES [facility] HAS CUT HIM OFF OF CLONIDINE. HE IS NEEDING TO DISCUSS THIS WITH JAY. PLEASE ADVISE.     Is it okay if the provider responds through MyChart: NO        "

## 2024-05-07 DIAGNOSIS — E55.9 VITAMIN D DEFICIENCY: ICD-10-CM

## 2024-05-08 ENCOUNTER — OFFICE VISIT (OUTPATIENT)
Dept: ONCOLOGY | Facility: CLINIC | Age: 74
End: 2024-05-08
Payer: MEDICARE

## 2024-05-08 ENCOUNTER — LAB (OUTPATIENT)
Dept: LAB | Facility: HOSPITAL | Age: 74
End: 2024-05-08
Payer: MEDICARE

## 2024-05-08 ENCOUNTER — INFUSION (OUTPATIENT)
Dept: ONCOLOGY | Facility: HOSPITAL | Age: 74
End: 2024-05-08
Payer: MEDICARE

## 2024-05-08 VITALS
HEART RATE: 48 BPM | SYSTOLIC BLOOD PRESSURE: 134 MMHG | OXYGEN SATURATION: 96 % | TEMPERATURE: 97.4 F | DIASTOLIC BLOOD PRESSURE: 45 MMHG | RESPIRATION RATE: 18 BRPM

## 2024-05-08 VITALS
HEIGHT: 72 IN | SYSTOLIC BLOOD PRESSURE: 118 MMHG | DIASTOLIC BLOOD PRESSURE: 72 MMHG | BODY MASS INDEX: 21.28 KG/M2 | OXYGEN SATURATION: 95 % | WEIGHT: 157.1 LBS | HEART RATE: 49 BPM | TEMPERATURE: 97.2 F | RESPIRATION RATE: 16 BRPM

## 2024-05-08 DIAGNOSIS — N18.4 ANEMIA DUE TO STAGE 4 CHRONIC KIDNEY DISEASE: Primary | ICD-10-CM

## 2024-05-08 DIAGNOSIS — C91.10 CLL (CHRONIC LYMPHOCYTIC LEUKEMIA): Primary | ICD-10-CM

## 2024-05-08 DIAGNOSIS — D63.1 ANEMIA DUE TO STAGE 4 CHRONIC KIDNEY DISEASE: Primary | ICD-10-CM

## 2024-05-08 DIAGNOSIS — D50.9 IRON DEFICIENCY ANEMIA, UNSPECIFIED IRON DEFICIENCY ANEMIA TYPE: ICD-10-CM

## 2024-05-08 DIAGNOSIS — D69.6 THROMBOCYTOPENIA: ICD-10-CM

## 2024-05-08 DIAGNOSIS — K70.30 ALCOHOLIC CIRRHOSIS, UNSPECIFIED WHETHER ASCITES PRESENT: ICD-10-CM

## 2024-05-08 DIAGNOSIS — D63.1 ANEMIA DUE TO STAGE 4 CHRONIC KIDNEY DISEASE: ICD-10-CM

## 2024-05-08 DIAGNOSIS — N18.4 ANEMIA DUE TO STAGE 4 CHRONIC KIDNEY DISEASE: ICD-10-CM

## 2024-05-08 LAB
ALBUMIN SERPL-MCNC: 4.1 G/DL (ref 3.5–5.2)
ALBUMIN/GLOB SERPL: 1.7 G/DL
ALP SERPL-CCNC: 160 U/L (ref 39–117)
ALT SERPL W P-5'-P-CCNC: 11 U/L (ref 1–41)
ANION GAP SERPL CALCULATED.3IONS-SCNC: 16 MMOL/L (ref 5–15)
AST SERPL-CCNC: 16 U/L (ref 1–40)
BASOPHILS # BLD AUTO: 0.06 10*3/MM3 (ref 0–0.2)
BASOPHILS NFR BLD AUTO: 1.3 % (ref 0–1.5)
BILIRUB SERPL-MCNC: 0.4 MG/DL (ref 0–1.2)
BUN SERPL-MCNC: 65 MG/DL (ref 8–23)
BUN/CREAT SERPL: 18 (ref 7–25)
CALCIUM SPEC-SCNC: 8.8 MG/DL (ref 8.6–10.5)
CHLORIDE SERPL-SCNC: 106 MMOL/L (ref 98–107)
CO2 SERPL-SCNC: 19 MMOL/L (ref 22–29)
CREAT SERPL-MCNC: 3.61 MG/DL (ref 0.76–1.27)
DEPRECATED RDW RBC AUTO: 53.3 FL (ref 37–54)
EGFRCR SERPLBLD CKD-EPI 2021: 16.7 ML/MIN/1.73
EOSINOPHIL # BLD AUTO: 0.15 10*3/MM3 (ref 0–0.4)
EOSINOPHIL NFR BLD AUTO: 3.2 % (ref 0.3–6.2)
ERYTHROCYTE [DISTWIDTH] IN BLOOD BY AUTOMATED COUNT: 14.8 % (ref 12.3–15.4)
FERRITIN SERPL-MCNC: 170.4 NG/ML (ref 30–400)
GLOBULIN UR ELPH-MCNC: 2.4 GM/DL
GLUCOSE SERPL-MCNC: 117 MG/DL (ref 65–99)
HCT VFR BLD AUTO: 33.1 % (ref 37.5–51)
HGB BLD-MCNC: 10.5 G/DL (ref 13–17.7)
HOLD SPECIMEN: NORMAL
IMM GRANULOCYTES # BLD AUTO: 0.02 10*3/MM3 (ref 0–0.05)
IMM GRANULOCYTES NFR BLD AUTO: 0.4 % (ref 0–0.5)
IRON 24H UR-MRATE: 87 MCG/DL (ref 59–158)
IRON SATN MFR SERPL: 31 % (ref 20–50)
LYMPHOCYTES # BLD AUTO: 0.94 10*3/MM3 (ref 0.7–3.1)
LYMPHOCYTES NFR BLD AUTO: 20.3 % (ref 19.6–45.3)
MCH RBC QN AUTO: 31.3 PG (ref 26.6–33)
MCHC RBC AUTO-ENTMCNC: 31.7 G/DL (ref 31.5–35.7)
MCV RBC AUTO: 98.8 FL (ref 79–97)
MONOCYTES # BLD AUTO: 0.57 10*3/MM3 (ref 0.1–0.9)
MONOCYTES NFR BLD AUTO: 12.3 % (ref 5–12)
NEUTROPHILS NFR BLD AUTO: 2.88 10*3/MM3 (ref 1.7–7)
NEUTROPHILS NFR BLD AUTO: 62.5 % (ref 42.7–76)
NRBC BLD AUTO-RTO: 0 /100 WBC (ref 0–0.2)
PLATELET # BLD AUTO: 83 10*3/MM3 (ref 140–450)
PMV BLD AUTO: 10.4 FL (ref 6–12)
POTASSIUM SERPL-SCNC: 4.4 MMOL/L (ref 3.5–5.2)
PROT SERPL-MCNC: 6.5 G/DL (ref 6–8.5)
RBC # BLD AUTO: 3.35 10*6/MM3 (ref 4.14–5.8)
SODIUM SERPL-SCNC: 141 MMOL/L (ref 136–145)
TIBC SERPL-MCNC: 285 MCG/DL (ref 298–536)
TRANSFERRIN SERPL-MCNC: 191 MG/DL (ref 200–360)
WBC NRBC COR # BLD AUTO: 4.62 10*3/MM3 (ref 3.4–10.8)

## 2024-05-08 PROCEDURE — 85025 COMPLETE CBC W/AUTO DIFF WBC: CPT

## 2024-05-08 PROCEDURE — 82728 ASSAY OF FERRITIN: CPT

## 2024-05-08 PROCEDURE — 84466 ASSAY OF TRANSFERRIN: CPT

## 2024-05-08 PROCEDURE — 25010000002 EPOETIN ALFA-EPBX 40000 UNIT/ML SOLUTION: Performed by: NURSE PRACTITIONER

## 2024-05-08 PROCEDURE — 83540 ASSAY OF IRON: CPT

## 2024-05-08 PROCEDURE — 96372 THER/PROPH/DIAG INJ SC/IM: CPT

## 2024-05-08 PROCEDURE — 80053 COMPREHEN METABOLIC PANEL: CPT

## 2024-05-08 PROCEDURE — 36415 COLL VENOUS BLD VENIPUNCTURE: CPT

## 2024-05-08 RX ORDER — ERGOCALCIFEROL 1.25 MG/1
50000 CAPSULE ORAL WEEKLY
Qty: 12 CAPSULE | Refills: 3 | Status: SHIPPED | OUTPATIENT
Start: 2024-05-08 | End: 2024-05-10 | Stop reason: SDUPTHER

## 2024-05-08 RX ORDER — CLOPIDOGREL BISULFATE 75 MG/1
75 TABLET ORAL DAILY
COMMUNITY

## 2024-05-08 RX ADMIN — EPOETIN ALFA-EPBX 40000 UNITS: 40000 INJECTION, SOLUTION INTRAVENOUS; SUBCUTANEOUS at 10:51

## 2024-05-08 NOTE — PROGRESS NOTES
[facility] HEMATOLOGY & ONCOLOGY  [address]  [place] [address] [phone]    Patient Name: Ricadro Hugo  Encounter Date: 05/08/2024  YOB: 1950   Patient Number: [id number]    Hematology / Oncology Progress Note      HPI / REASON FOR VISIT: Ricardo Hugo is a 74 y.o. male who is followed by this office for CLL, Iron Deficiency Anemia, Chronic Kidney Disease.  He sees Dr. Rueda for nephrology.      Bone marrow biopsy done 7/12/2021:  with a flow cytometry showing a 2% monoclonal B-cell kappa population consistent with CLL/SLL.    CLL panel:  Negative for a t (11;14)/CCND1-IGH rearrangement  Negative for deletion of MARIA on the long arm of chromosome 11 q. 22  Negative for trisomy 12  Negative for deletion of DLEU1, DLEU2 on the long arm of chromosome 13 q. 14 and monosomy 13  Negative for deletion T p53 in the short arm of chromosome 17 P 13  Cytogenetics showed normal male karyotype  Bone marrow biopsy and aspirate show involvement by chronic lymphocytic leukemia/small lymphocytic lymphoma and up to 5% of cellularity, mild hypercellular marrow with maturing trilineage hematopoiesis, erythroid hyperplasia and a mild atypical small lymphocytic infiltrate with a dominant nodular pattern  Storage iron is present    He has received Injectafer for WALE and Retacrit for anemia in CKD in the past.    INTERVAL HISTORY    Pt  presents to the clinic today for continued follow up.  He has no acute complaint.    Last Retacrit injection was 4/24/24 for Hgb 10.9.  He is tolerating those injections well.  He had labs drawn today and results were reviewed with him in office.     LABS    Lab Results - Last 18 Months   Lab Units 05/22/24  0849 05/08/24  0946 04/24/24  0808 04/10/24  0821 03/27/24  0804 03/13/24  0847 02/28/24  0850 02/14/24  0937 08/03/23  0829 07/18/23  1038   HEMOGLOBIN g/dL 10.4* 10.5* 10.9* 10.6* 10.4* 10.0* 9.4*  8.7*   < > 7.0*   HEMATOCRIT % 34.4* 33.1* 34.8* 34.3* 32.8* 31.9* 29.8* 27.3*   < > 23.4*   MCV fL 99.7* 98.8* 100.0* 103.3* 101.2* 103.6* 103.1* 99.6*   < > 105.4*   WBC 10*3/mm3 4.64 4.62 4.85 5.71 5.12 4.75 5.23 4.92   < > 5.13   RDW % 15.7* 14.8 14.0 14.3 14.6 15.8* 17.0* 15.9*   < > 17.2*   MPV fL 10.2 10.4 10.4 10.5 9.7 10.1 9.7 9.8   < > 10.0   PLATELETS 10*3/mm3 85* 83* 101* 100* 101* 117* 124* 110*   < > 122*   IMM GRAN % % 0.6* 0.4  --   --  0.4 0.6* 0.6* 0.8*   < >  --    NEUTROS ABS 10*3/mm3 3.16 2.88  --   --  3.28 2.91 3.26 3.23   < > 4.40   LYMPHS ABS 10*3/mm3 0.80 0.94  --   --  0.99 0.94 1.06 0.96   < >  --    MONOS ABS 10*3/mm3 0.46 0.57  --   --  0.59 0.64 0.72 0.56   < >  --    EOS ABS 10*3/mm3 0.15 0.15  --   --  0.19 0.18 0.12 0.07   < >  --    BASOS ABS 10*3/mm3 0.04 0.06  --   --  0.05 0.05 0.04 0.06   < >  --    IMMATURE GRANS (ABS) 10*3/mm3 0.03 0.02  --   --  0.02 0.03 0.03 0.04   < >  --    NRBC /100 WBC 0.0 0.0  --   --  0.0 0.0 0.0 0.0   < >  --    NEUTROPHIL % %  --   --   --   --   --   --   --   --   --  84.7*   MONOCYTES % %  --   --   --   --   --   --   --   --   --  4.1*    < > = values in this interval not displayed.       Lab Results - Last 18 Months   Lab Units 05/22/24  0849 05/08/24  0946 02/14/24  0937 11/22/23  0918 11/08/23  0827 09/13/23  0813 08/03/23  0829   GLUCOSE mg/dL 98 117* 158* 119* 109* 124* 134*   SODIUM mmol/L 140 141 138 139 136 138 141   POTASSIUM mmol/L 4.1 4.4 4.3 4.4 4.6 5.0 4.5   CO2 mmol/L 21.0* 19.0* 21.0* 19.0* 17.0* 17.0* 21.0*   CHLORIDE mmol/L 106 106 104 106 104 106 108*   ANION GAP mmol/L 13.0 16.0* 13.0 14.0 15.0 15.0 12.0   CREATININE mg/dL 3.11* 3.61* 3.10* 3.31* 3.17* 3.45* 3.19*   BUN mg/dL 48* 65* 45* 56* 53* 64* 47*   BUN / CREAT RATIO  15.4 18.0 14.5 16.9 16.7 18.6 14.7   CALCIUM mg/dL 8.6 8.8 8.5* 8.3* 8.7 8.6 9.0   ALK PHOS U/L 164* 160* 133* 173* 151*  --  137*   TOTAL PROTEIN g/dL 6.4 6.5 6.5 6.8 6.4  --  6.2   ALT (SGPT) U/L 13 11  13 11 11  --  8   AST (SGOT) U/L 21 16 18 16 18  --  13   BILIRUBIN mg/dL 0.5 0.4 0.4 0.3 0.4  --  0.4   ALBUMIN g/dL 4.0 4.1 4.0 4.0 3.8  --  3.8   GLOBULIN gm/dL 2.4 2.4 2.5 2.8 2.6  --  2.4       Lab Results - Last 18 Months   Lab Units 05/22/24  0849 02/14/24  0937 11/08/23  0827   URIC ACID mg/dL 9.0* 8.0* 7.5*         Lab Results - Last 18 Months   Lab Units 05/08/24  0946 02/28/24  0850 02/14/24  0937 11/22/23  0918 11/08/23  0827 08/03/23  0829 07/18/23  1038 04/14/23  0836 01/27/23  0717   IRON mcg/dL 87  --  89 79 51* 67 69 64  --    TIBC mcg/dL 285*  --  235* 244* 235* 244* 247* 262*  --    IRON SATURATION (TSAT) % 31  --  38 32 22 27 28 24  --    FERRITIN ng/mL 170.40  --  315.30 218.70 256.60  --  548.80* 282.40  --    TSH uIU/mL  --  5.790*  --   --   --   --   --   --  2.830         PAST MEDICAL HISTORY:  ALLERGIES:  Allergies   Allergen Reactions    Ondansetron Anaphylaxis and GI Intolerance    Zofran [Ondansetron Hcl] Anaphylaxis    Lortab [Hydrocodone-Acetaminophen] Other (See Comments) and Hallucinations     CLOSTROPHOBIC    Allopurinol Other (See Comments)     Pain on right side     CURRENT MEDICATIONS:  Outpatient Encounter Medications as of 5/8/2024   Medication Sig Dispense Refill    albuterol sulfate  (90 Base) MCG/ACT inhaler Inhale 2 puffs 4 (Four) Times a Day As Needed for Shortness of Air or Wheezing. 20.1 g 3    ALPRAZolam (XANAX) 0.25 MG tablet Take 1 tablet by mouth Every Night. 90 tablet 1    aspirin (aspirin) 81 MG EC tablet Take  by mouth Daily.      azelastine (ASTELIN) 0.1 % nasal spray USE 1 SPRAY IN EACH NOSTRIL TWICE A DAY AS NEEDED 90 mL 1    carvedilol (COREG) 25 MG tablet Take 1 tablet by mouth 2 (Two) Times a Day With Meals. 180 tablet 3    cholestyramine (QUESTRAN) 4 g packet MIX AND DRINK 1 PACKET DAILY 90 packet 3    ciprofloxacin-dexAMETHasone (Ciprodex) 0.3-0.1 % otic suspension Administer 4 drops to the right ear 2 (Two) Times a Day. 7.5 mL 0    cloNIDine  (Catapres) 0.3 MG tablet Take 1 tablet by mouth 3 (Three) Times a Day. 270 tablet 3    cloNIDine (CATAPRES-TTS) 0.1 MG/24HR patch Place 1 patch on the skin as directed by provider 1 (One) Time Per Week.      clopidogrel (PLAVIX) 75 MG tablet Take 1 tablet by mouth Daily.      diphenhydrAMINE HCl, Sleep, (ZzzQuil) 25 MG capsule Take  by mouth Every Night.      fexofenadine (ALLEGRA) 180 MG tablet Take 1 tablet by mouth Daily.      fluticasone (FLONASE) 50 MCG/ACT nasal spray USE 2 SPRAYS INTO THE  NOSTRIL(S) AS DIRECTED BY PROVIDER DAILY AS NEEDED FOR RHINITIS 48 g 3    furosemide (LASIX) 20 MG tablet TAKE 1 TABLET TWICE A  tablet 3    furosemide (LASIX) 20 MG tablet Take 1 tablet by mouth 2 (Two) Times a Day. 60 tablet 0    furosemide (Lasix) 20 MG tablet Take 1 tablet by mouth 2 (Two) Times a Day. 180 tablet 3    hydrALAZINE (APRESOLINE) 100 MG tablet Take 1 tablet by mouth 3 (Three) Times a Day. 100mg daily      levothyroxine (Synthroid) 75 MCG tablet Take 1 tablet by mouth Daily. 90 tablet 3    magnesium chloride ER 64 MG DR tablet Take 143 mg by mouth Daily.      Melatonin 10 MG capsule Take 2 capsules by mouth Every Night.      mesalamine (APRISO) 0.375 g 24 hr capsule TAKE 4 CAPSULES DAILY 360 capsule 3    montelukast (SINGULAIR) 10 MG tablet Take 1 tablet by mouth Every Night. 90 tablet 3    Multiple Vitamins-Minerals (PRESERVISION/LUTEIN) capsule Take 1 capsule by mouth 2 (two) times a day.      NIFEdipine CC (ADALAT CC) 90 MG 24 hr tablet Take 1 tablet by mouth Daily. 90 tablet 3    omeprazole (priLOSEC) 20 MG capsule TAKE 1 CAPSULE DAILY 90 capsule 1    Oxymetazoline HCl (Nasal Spray) 0.05 % solution       phenylephrine (MAYELA-SYNEPHRINE) 1 % nasal spray 1 spray into the nostril(s) as directed by provider Daily As Needed (ear bleeding). 1 each 0    potassium chloride 10 MEQ CR tablet TAKE 1 TABLET TWICE A  tablet 3    sodium bicarbonate 650 MG tablet 2 qam, 1 at noon, and 2 qpm 450 tablet 3     valsartan (DIOVAN) 320 MG tablet Take 1 tablet by mouth Daily.      [DISCONTINUED] atorvastatin (LIPITOR) 10 MG tablet TAKE 1 TABLET DAILY 90 tablet 3    [DISCONTINUED] vitamin D (ERGOCALCIFEROL) 1.25 MG (50207 UT) capsule capsule Take 1 capsule by mouth 1 (One) Time Per Week. 12 capsule 3     Facility-Administered Encounter Medications as of 5/8/2024   Medication Dose Route Frequency Provider Last Rate Last Admin    cyanocobalamin injection 1,000 mcg  1,000 mcg Intramuscular Q28 Days Ruthie Parra APRN   1,000 mcg at 08/11/23 1123     ADULT ILLNESSES:  Patient Active Problem List   Diagnosis Code    Chronic rhinitis J31.0    Resistant hypertension I1A.0    Chronic diarrhea K52.9    Symptomatic anemia D64.9    Wellness examination Z00.00    PAD (peripheral artery disease) I73.9    IHD (ischemic heart disease) I25.9    Carotid stenosis I65.29    Stenosis of right carotid artery I65.21    Carotid stenosis, right I65.21    Diastolic dysfunction I51.89    CRD (chronic renal disease), stage IV N18.4    Anemia due to chronic kidney disease N18.9, D63.1    Copper deficiency E61.0    Low iron  E61.1    CLL (chronic lymphocytic leukemia) C91.10    Perforation of left tympanic membrane H72.92    Mixed hyperlipidemia E78.2    Systolic murmur R01.1    Eustachian tube dysfunction, bilateral H69.93    Sensorineural hearing loss (SNHL), bilateral H90.3    Anticoagulated Z79.01    Nasal septal deviation J34.2    Hypertrophy of inferior nasal turbinate J34.3    Unilateral recurrent inguinal hernia without obstruction or gangrene K40.91    Bilateral inguinal hernia without obstruction or gangrene K40.20    Sleep difficulties G47.9    Crohn's disease of large intestine without complication K50.10     SURGERIES:  Past Surgical History:   Procedure Laterality Date    ARTERY SURGERY  2021    right side on neck    CAROTID ENDARTERECTOMY Right 5/10/2021    Procedure: RIGHT CAROTID ENDARTERECTOMY WITH EEG;  Surgeon: Gil Pineda  K, DO;  Location: [facility] HYBRID OR 12;  Service: Vascular;  Laterality: Right;    COLONOSCOPY N/A 7/2/2020    Procedure: COLONOSCOPY WITH ANESTHESIA;  Surgeon: Adrien Brewster MD;  Location: [facility] ENDOSCOPY;  Service: Gastroenterology;  Laterality: N/A;  pre op: diarrhea  post op: polyps  PCP: Joe Velasco MD    COLONOSCOPY N/A 10/13/2020    Procedure: COLONOSCOPY WITH ANESTHESIA;  Surgeon: Adrien Brewster MD;  Location: [facility] ENDOSCOPY;  Service: Gastroenterology;  Laterality: N/A;  Pre: Chronic Diarrhea, Crohn's  Post: AVM  Dr. Neftali Velasco  CO2 Inflation Used    COLONOSCOPY N/A 12/8/2023    Procedure: COLONOSCOPY WITH ANESTHESIA;  Surgeon: Adrien Brewster MD;  Location: [facility] ENDOSCOPY;  Service: Gastroenterology;  Laterality: N/A;  pre chrone's disease  post sub optimal prep, polyp, chrone's      CORONARY ARTERY BYPASS GRAFT  2003    x3    ENDOSCOPY N/A 11/2/2021    Procedure: ESOPHAGOGASTRODUODENOSCOPY WITH ANESTHESIA;  Surgeon: Bridger Bell MD;  Location: [facility] ENDOSCOPY;  Service: Gastroenterology;  Laterality: N/A;  pre anemia;gi bleed  post  gi bleed;schatski ring  Dr. ERIC Velasco    ENDOSCOPY N/A 10/10/2023    Procedure: ESOPHAGOGASTRODUODENOSCOPY WITH ANESTHESIA;  Surgeon: Adrien Brewster MD;  Location: [facility] ENDOSCOPY;  Service: Gastroenterology;  Laterality: N/A;  preop; anemia  postop esophagitis ; R/O barretts   PCP Randall Beata    EYE SURGERY Bilateral     catorac    INCISION AND DRAINAGE PERIRECTAL ABSCESS N/A 3/3/2017    Procedure: INCISION AND DRAINAGE OF JEET ANAL ABSCESS;  Surgeon: Lynette Smith MD;  Location: [facility] OR;  Service:     INGUINAL HERNIA REPAIR Bilateral 6/27/2023    Procedure: INGUINAL HERNIA BILATERAL REPAIR LAPAROSCOPIC WITH DAVINCI ROBOT WITH MESH;  Surgeon: Tahira Rivera MD;  Location: [facility] OR;  Service: Robotics - DaVinci;  Laterality: Bilateral;    MYRINGOTOMY W/ TUBES Left 04/17/2017    06/10/2016    TONSILLECTOMY      TOTAL HIP  ARTHROPLASTY Right 2006     HEALTH MAINTENANCE ITEMS:  Health Maintenance Due   Topic Date Due    TDAP/TD VACCINES (1 - Tdap) Never done    Hepatitis B (1 of 3 - Risk 3-dose series) Never done    COVID-19 Vaccine (7 - 2023-24 season) 11/14/2023       <no information>  Last Completed Colonoscopy            COLORECTAL CANCER SCREENING (COLONOSCOPY - Every 3 Years) Next due on 12/8/2026 12/08/2023  COLONOSCOPY    12/08/2023  Surgical Procedure: COLONOSCOPY    10/28/2021  POC Occult Blood Stool    10/13/2020  COLONOSCOPY    10/13/2020  Surgical Procedure: COLONOSCOPY    Only the first 5 history entries have been loaded, but more history exists.                  Immunization History   Administered Date(s) Administered    ABRYSVO (RSV, 60+ or pregnant women 32-36 wks) 09/13/2023    COVID-19 (MODERNA) 1st,2nd,3rd Dose Monovalent 02/24/2021, 03/24/2021, 08/30/2021, 03/30/2022    COVID-19 (MODERNA) BIVALENT 12+YRS 09/08/2022    COVID-19 F23 (MODERNA) 12YRS+ (SPIKEVAX) 09/19/2023    Flu Vaccine Split Quad 09/30/2019    Fluad Quad 65+ 09/19/2023    Fluzone (or Fluarix & Flulaval for VFC) >6mos 09/22/2020    Fluzone High-Dose 65+yrs 10/12/2021, 09/08/2022    Fluzone Quad >6mos (Multi-dose) 09/25/2018, 09/30/2019, 09/22/2020    Influenza TIV (IM) 10/18/2017    Influenza Whole 09/28/2015    Pneumococcal Conjugate 13-Valent (PCV13) 12/28/2014    Pneumococcal Conjugate 20-Valent (PCV20) 02/16/2024    Pneumococcal Polysaccharide (PPSV23) 03/17/2006    Shingrix 12/04/2021, 02/02/2022    Zostavax 12/28/2014     Last Completed Mammogram       This patient has no relevant Health Maintenance data.              FAMILY HISTORY:  Family History   Problem Relation Age of Onset    Breast cancer Mother     Dementia Father     Glaucoma Father     No Known Problems Daughter     Colon polyps Neg Hx     Colon cancer Neg Hx      SOCIAL HISTORY:  Social History     Socioeconomic History    Marital status:    Tobacco Use    Smoking  "status: Former     Current packs/day: 0.00     Average packs/day: 0.5 packs/day for 25.8 years (12.9 ttl pk-yrs)     Types: Cigarettes     Start date: 1988     Quit date: 10/13/2013     Years since quitting: 10.6     Passive exposure: Past    Smokeless tobacco: Never    Tobacco comments:     quit 2013   Vaping Use    Vaping status: Never Used   Substance and Sexual Activity    Alcohol use: Not Currently    Drug use: No    Sexual activity: Not Currently     Partners: Female       REVIEW OF SYSTEMS:  Review of Systems   Constitutional:  Negative for activity change, appetite change, fatigue, fever, unexpected weight gain and unexpected weight loss.   HENT:  Negative for dental problem, facial swelling, swollen glands and trouble swallowing.    Eyes:  Negative for double vision and discharge.   Respiratory:  Negative for cough, shortness of breath and wheezing.    Cardiovascular:  Negative for chest pain, palpitations and leg swelling.   Gastrointestinal:  Negative for abdominal pain, blood in stool, nausea and vomiting.   Endocrine: Negative.    Genitourinary:  Negative for dysuria and hematuria.   Musculoskeletal:  Negative for arthralgias and myalgias.   Skin:  Negative for rash, skin lesions and wound.   Allergic/Immunologic: Negative for immunocompromised state.   Neurological:  Negative for speech difficulty, light-headedness, headache, memory problem and confusion.   Hematological:  Negative for adenopathy.   Psychiatric/Behavioral:  Negative for self-injury, suicidal ideas and depressed mood. The patient is not nervous/anxious.        /72   Pulse (!) 49   Temp 97.2 °F (36.2 °C)   Resp 16   Ht 182.9 cm (72\")   Wt 71.3 kg (157 lb 1.6 oz)   SpO2 95%   BMI 21.31 kg/m²  Body surface area is 1.92 meters squared.    Pain Score    05/08/24 1006   PainSc: 0-No pain         Physical Exam  Constitutional:       Appearance: Normal appearance.   HENT:      Head: Normocephalic and atraumatic.   Cardiovascular: "      Rate and Rhythm: Normal rate and regular rhythm.   Pulmonary:      Effort: Pulmonary effort is normal.      Breath sounds: Normal breath sounds.   Abdominal:      General: Bowel sounds are normal.      Palpations: Abdomen is soft.   Musculoskeletal:      Right lower leg: No edema.      Left lower leg: No edema.   Skin:     General: Skin is warm and dry.   Neurological:      Mental Status: He is alert and oriented to person, place, and time.   Psychiatric:         Attention and Perception: Attention normal.         Mood and Affect: Mood normal.         Judgment: Judgment normal.         Ricardo Hugo reports a pain score of 0.  No intervention indicated         ASSESSMENT / PLAN    1. CLL (chronic lymphocytic leukemia)    2. Anemia due to stage 4 chronic kidney disease    3. Iron deficiency anemia, unspecified iron deficiency anemia type    4. Thrombocytopenia    5. Alcoholic cirrhosis, unspecified whether ascites present             ASSESSMENT:         1.  CLL  - Clinical stage 0 from 7/20/2021:  Bone marrow biopsy done 7/12/2021:  with a flow cytometry showing a 2% monoclonal B-cell kappa population consistent with CLL/SLL.    CLL panel:  Negative for a t (11;14)/CCND1-IGH rearrangement  Negative for deletion of MARIA on the long arm of chromosome 11 q. 22  Negative for trisomy 12  Negative for deletion of DLEU1, DLEU2 on the long arm of chromosome 13 q. 14 and monosomy 13  Negative for deletion T p53 in the short arm of chromosome 17 P 13  Cytogenetics showed normal male karyotype  Bone marrow biopsy and aspirate show involvement by chronic lymphocytic leukemia/small lymphocytic lymphoma and up to 5% of cellularity, mild hypercellular marrow with maturing trilineage hematopoiesis, erythroid hyperplasia and a mild atypical small lymphocytic infiltrate with a dominant nodular pattern  Storage iron is present  - Labs today:   WBC 4.62 with normal absolute differential  -Pt denies any B symptoms  PLAN:  Stable  for observation   Pt is aware of symptoms to watch for     2.  Anemia in Chronic Kidney Disease Stage IV  3.  Iron Deficiency Anemia   -Had Injectafer December 1, 2022  -Currently receiving Retacrit for anemia  -Labs today: BUN 65, creatinine 3.61, GFR 16.7, Iron 87, Ferritin 170, Sat 31%, TIBC 285, Hgb 10.5, Hct 33.1  -Will Continue Retacrit today and biweekly for Hgb < 11 Or HCT < 33   -Sees Dr. Francis, Nephrology ([facility])      4. Cirrhosis of Liver  5.  Thrombocytopenia  -History of chronic alcoholism  - Cirrhotic morphology/appearance of the liver on 07/12/21 ultrasound   -Likely exacerbating anemia and thrombocytopenia  -Labs today:  Platelets 83  -PLAN:  Stable for observation     PLAN:  Continue Retacrit 40K units  today and biweekly for Hgb < 11 or Hct < 33  -Continue current medications, treatment plans and follow up with PCP and any other specialist  Return to office in 3 months with CBC, CMP, Iron Profile and Ferritin   Care discussed with patient.  Understanding expressed.  Patient agreeable with plan.      Becky Camacoh, APRN  05/08/2024

## 2024-05-10 DIAGNOSIS — I73.9 PAD (PERIPHERAL ARTERY DISEASE): ICD-10-CM

## 2024-05-10 DIAGNOSIS — E55.9 VITAMIN D DEFICIENCY: ICD-10-CM

## 2024-05-10 RX ORDER — ATORVASTATIN CALCIUM 10 MG/1
10 TABLET, FILM COATED ORAL DAILY
Qty: 90 TABLET | Refills: 3 | Status: SHIPPED | OUTPATIENT
Start: 2024-05-10

## 2024-05-10 RX ORDER — ERGOCALCIFEROL 1.25 MG/1
50000 CAPSULE ORAL WEEKLY
Qty: 12 CAPSULE | Refills: 3 | Status: SHIPPED | OUTPATIENT
Start: 2024-05-10

## 2024-05-20 DIAGNOSIS — D63.1 ANEMIA DUE TO STAGE 4 CHRONIC KIDNEY DISEASE: Primary | ICD-10-CM

## 2024-05-20 DIAGNOSIS — N18.4 ANEMIA DUE TO STAGE 4 CHRONIC KIDNEY DISEASE: Primary | ICD-10-CM

## 2024-05-22 ENCOUNTER — LAB (OUTPATIENT)
Dept: LAB | Facility: HOSPITAL | Age: 74
End: 2024-05-22
Payer: MEDICARE

## 2024-05-22 ENCOUNTER — TRANSCRIBE ORDERS (OUTPATIENT)
Dept: ADMINISTRATIVE | Facility: HOSPITAL | Age: 74
End: 2024-05-22
Payer: MEDICARE

## 2024-05-22 ENCOUNTER — INFUSION (OUTPATIENT)
Dept: ONCOLOGY | Facility: HOSPITAL | Age: 74
End: 2024-05-22
Payer: MEDICARE

## 2024-05-22 VITALS
WEIGHT: 152 LBS | RESPIRATION RATE: 18 BRPM | TEMPERATURE: 96.9 F | HEART RATE: 48 BPM | SYSTOLIC BLOOD PRESSURE: 166 MMHG | DIASTOLIC BLOOD PRESSURE: 47 MMHG | HEIGHT: 72 IN | OXYGEN SATURATION: 96 % | BODY MASS INDEX: 20.59 KG/M2

## 2024-05-22 DIAGNOSIS — N18.4 ANEMIA DUE TO STAGE 4 CHRONIC KIDNEY DISEASE: ICD-10-CM

## 2024-05-22 DIAGNOSIS — I10 ESSENTIAL HYPERTENSION, MALIGNANT: Primary | ICD-10-CM

## 2024-05-22 DIAGNOSIS — N18.4 ANEMIA DUE TO STAGE 4 CHRONIC KIDNEY DISEASE: Primary | ICD-10-CM

## 2024-05-22 DIAGNOSIS — I10 ESSENTIAL HYPERTENSION, MALIGNANT: ICD-10-CM

## 2024-05-22 DIAGNOSIS — N18.4 CHRONIC KIDNEY DISEASE, STAGE IV (SEVERE): ICD-10-CM

## 2024-05-22 DIAGNOSIS — D63.1 ANEMIA DUE TO STAGE 4 CHRONIC KIDNEY DISEASE: Primary | ICD-10-CM

## 2024-05-22 DIAGNOSIS — D63.1 ANEMIA DUE TO STAGE 4 CHRONIC KIDNEY DISEASE: ICD-10-CM

## 2024-05-22 LAB
25(OH)D3 SERPL-MCNC: 68.1 NG/ML (ref 30–100)
ALBUMIN SERPL-MCNC: 4 G/DL (ref 3.5–5.2)
ALBUMIN/GLOB SERPL: 1.7 G/DL
ALP SERPL-CCNC: 164 U/L (ref 39–117)
ALT SERPL W P-5'-P-CCNC: 13 U/L (ref 1–41)
ANION GAP SERPL CALCULATED.3IONS-SCNC: 13 MMOL/L (ref 5–15)
AST SERPL-CCNC: 21 U/L (ref 1–40)
BACTERIA UR QL AUTO: ABNORMAL /HPF
BASOPHILS # BLD AUTO: 0.04 10*3/MM3 (ref 0–0.2)
BASOPHILS NFR BLD AUTO: 0.9 % (ref 0–1.5)
BILIRUB SERPL-MCNC: 0.5 MG/DL (ref 0–1.2)
BILIRUB UR QL STRIP: NEGATIVE
BUN SERPL-MCNC: 48 MG/DL (ref 8–23)
BUN/CREAT SERPL: 15.4 (ref 7–25)
CALCIUM SPEC-SCNC: 8.6 MG/DL (ref 8.6–10.5)
CHLORIDE SERPL-SCNC: 106 MMOL/L (ref 98–107)
CHOLEST SERPL-MCNC: 90 MG/DL (ref 0–200)
CLARITY UR: CLEAR
CO2 SERPL-SCNC: 21 MMOL/L (ref 22–29)
COLOR UR: YELLOW
CREAT SERPL-MCNC: 3.11 MG/DL (ref 0.76–1.27)
CREAT UR-MCNC: 53.5 MG/DL
DEPRECATED RDW RBC AUTO: 57.1 FL (ref 37–54)
EGFRCR SERPLBLD CKD-EPI 2021: 20 ML/MIN/1.73
EOSINOPHIL # BLD AUTO: 0.15 10*3/MM3 (ref 0–0.4)
EOSINOPHIL NFR BLD AUTO: 3.2 % (ref 0.3–6.2)
ERYTHROCYTE [DISTWIDTH] IN BLOOD BY AUTOMATED COUNT: 15.7 % (ref 12.3–15.4)
GLOBULIN UR ELPH-MCNC: 2.4 GM/DL
GLUCOSE SERPL-MCNC: 98 MG/DL (ref 65–99)
GLUCOSE UR STRIP-MCNC: NEGATIVE MG/DL
HCT VFR BLD AUTO: 34.4 % (ref 37.5–51)
HDLC SERPL-MCNC: 37 MG/DL (ref 40–60)
HGB BLD-MCNC: 10.4 G/DL (ref 13–17.7)
HGB UR QL STRIP.AUTO: NEGATIVE
HYALINE CASTS UR QL AUTO: ABNORMAL /LPF
IMM GRANULOCYTES # BLD AUTO: 0.03 10*3/MM3 (ref 0–0.05)
IMM GRANULOCYTES NFR BLD AUTO: 0.6 % (ref 0–0.5)
KETONES UR QL STRIP: NEGATIVE
LDLC SERPL CALC-MCNC: 40 MG/DL (ref 0–100)
LDLC/HDLC SERPL: 1.12 {RATIO}
LEUKOCYTE ESTERASE UR QL STRIP.AUTO: NEGATIVE
LYMPHOCYTES # BLD AUTO: 0.8 10*3/MM3 (ref 0.7–3.1)
LYMPHOCYTES NFR BLD AUTO: 17.2 % (ref 19.6–45.3)
MAGNESIUM SERPL-MCNC: 1.9 MG/DL (ref 1.6–2.4)
MCH RBC QN AUTO: 30.1 PG (ref 26.6–33)
MCHC RBC AUTO-ENTMCNC: 30.2 G/DL (ref 31.5–35.7)
MCV RBC AUTO: 99.7 FL (ref 79–97)
MONOCYTES # BLD AUTO: 0.46 10*3/MM3 (ref 0.1–0.9)
MONOCYTES NFR BLD AUTO: 9.9 % (ref 5–12)
NEUTROPHILS NFR BLD AUTO: 3.16 10*3/MM3 (ref 1.7–7)
NEUTROPHILS NFR BLD AUTO: 68.2 % (ref 42.7–76)
NITRITE UR QL STRIP: NEGATIVE
NRBC BLD AUTO-RTO: 0 /100 WBC (ref 0–0.2)
PH UR STRIP.AUTO: 6 [PH] (ref 5–8)
PHOSPHATE SERPL-MCNC: 4.3 MG/DL (ref 2.5–4.5)
PLATELET # BLD AUTO: 85 10*3/MM3 (ref 140–450)
PMV BLD AUTO: 10.2 FL (ref 6–12)
POTASSIUM SERPL-SCNC: 4.1 MMOL/L (ref 3.5–5.2)
PROT ?TM UR-MCNC: 150 MG/DL
PROT SERPL-MCNC: 6.4 G/DL (ref 6–8.5)
PROT UR QL STRIP: ABNORMAL
PROT/CREAT UR: 2803.7 MG/G CREA (ref 0–200)
PTH-INTACT SERPL-MCNC: 194.6 PG/ML (ref 15–65)
RBC # BLD AUTO: 3.45 10*6/MM3 (ref 4.14–5.8)
RBC # UR STRIP: ABNORMAL /HPF
REF LAB TEST METHOD: ABNORMAL
SODIUM SERPL-SCNC: 140 MMOL/L (ref 136–145)
SP GR UR STRIP: 1.01 (ref 1–1.03)
SQUAMOUS #/AREA URNS HPF: ABNORMAL /HPF
TRIGL SERPL-MCNC: 57 MG/DL (ref 0–150)
URATE SERPL-MCNC: 9 MG/DL (ref 3.4–7)
UROBILINOGEN UR QL STRIP: ABNORMAL
VLDLC SERPL-MCNC: 13 MG/DL (ref 5–40)
WBC # UR STRIP: ABNORMAL /HPF
WBC NRBC COR # BLD AUTO: 4.64 10*3/MM3 (ref 3.4–10.8)

## 2024-05-22 PROCEDURE — 25010000002 EPOETIN ALFA-EPBX 40000 UNIT/ML SOLUTION: Performed by: NURSE PRACTITIONER

## 2024-05-22 PROCEDURE — 81001 URINALYSIS AUTO W/SCOPE: CPT

## 2024-05-22 PROCEDURE — 80061 LIPID PANEL: CPT | Performed by: INTERNAL MEDICINE

## 2024-05-22 PROCEDURE — 80053 COMPREHEN METABOLIC PANEL: CPT

## 2024-05-22 PROCEDURE — 84550 ASSAY OF BLOOD/URIC ACID: CPT

## 2024-05-22 PROCEDURE — 85025 COMPLETE CBC W/AUTO DIFF WBC: CPT

## 2024-05-22 PROCEDURE — 36415 COLL VENOUS BLD VENIPUNCTURE: CPT

## 2024-05-22 PROCEDURE — 96372 THER/PROPH/DIAG INJ SC/IM: CPT

## 2024-05-22 PROCEDURE — 82306 VITAMIN D 25 HYDROXY: CPT

## 2024-05-22 PROCEDURE — 82570 ASSAY OF URINE CREATININE: CPT

## 2024-05-22 PROCEDURE — 83970 ASSAY OF PARATHORMONE: CPT

## 2024-05-22 PROCEDURE — 84100 ASSAY OF PHOSPHORUS: CPT

## 2024-05-22 PROCEDURE — 83735 ASSAY OF MAGNESIUM: CPT

## 2024-05-22 PROCEDURE — 84156 ASSAY OF PROTEIN URINE: CPT

## 2024-05-22 RX ADMIN — EPOETIN ALFA-EPBX 40000 UNITS: 40000 INJECTION, SOLUTION INTRAVENOUS; SUBCUTANEOUS at 09:25

## 2024-05-31 ENCOUNTER — OFFICE VISIT (OUTPATIENT)
Dept: INTERNAL MEDICINE | Facility: CLINIC | Age: 74
End: 2024-05-31
Payer: MEDICARE

## 2024-05-31 VITALS
DIASTOLIC BLOOD PRESSURE: 54 MMHG | BODY MASS INDEX: 19.91 KG/M2 | WEIGHT: 147 LBS | OXYGEN SATURATION: 94 % | TEMPERATURE: 97.8 F | HEART RATE: 53 BPM | HEIGHT: 72 IN | SYSTOLIC BLOOD PRESSURE: 158 MMHG

## 2024-05-31 DIAGNOSIS — D69.6 PLATELETS DECREASED: ICD-10-CM

## 2024-05-31 DIAGNOSIS — N25.81 SECONDARY HYPERPARATHYROIDISM OF RENAL ORIGIN: ICD-10-CM

## 2024-05-31 DIAGNOSIS — I1A.0 RESISTANT HYPERTENSION: ICD-10-CM

## 2024-05-31 DIAGNOSIS — J44.9 STAGE 2 MODERATE COPD BY GOLD CLASSIFICATION: ICD-10-CM

## 2024-05-31 DIAGNOSIS — I51.89 DIASTOLIC DYSFUNCTION: ICD-10-CM

## 2024-05-31 DIAGNOSIS — I73.9 PAD (PERIPHERAL ARTERY DISEASE): ICD-10-CM

## 2024-05-31 DIAGNOSIS — C91.10 CLL (CHRONIC LYMPHOCYTIC LEUKEMIA): ICD-10-CM

## 2024-05-31 DIAGNOSIS — Z79.899 ENCOUNTER FOR LONG-TERM CURRENT USE OF MEDICATION: Primary | ICD-10-CM

## 2024-05-31 DIAGNOSIS — E78.2 MIXED HYPERLIPIDEMIA: ICD-10-CM

## 2024-05-31 DIAGNOSIS — E55.9 VITAMIN D DEFICIENCY: ICD-10-CM

## 2024-05-31 DIAGNOSIS — N18.4 CRD (CHRONIC RENAL DISEASE), STAGE IV: ICD-10-CM

## 2024-05-31 PROBLEM — K50.10 CROHN'S DISEASE OF LARGE INTESTINE WITHOUT COMPLICATION: Status: RESOLVED | Noted: 2023-08-21 | Resolved: 2024-05-31

## 2024-05-31 LAB
AMPHETAMINES UR QL: NEGATIVE
BARBITURATE INTERNAL CONTROL: ABNORMAL
BARBITURATES UR QL SCN: NEGATIVE
BENZODIAZ UR QL SCN: POSITIVE
BENZODIAZEPINE INTERNAL CONTROL: ABNORMAL
BUPRENORPHINE INTERNAL CONTROL: ABNORMAL
BUPRENORPHINE SERPL-MCNC: NEGATIVE NG/ML
CANNABINOIDS SERPL QL: NEGATIVE
COCAINE INTERNAL CONTROL: ABNORMAL
COCAINE UR QL: NEGATIVE
EXPIRATION DATE: ABNORMAL
Lab: ABNORMAL
MDMA (ECSTASY) INTERNAL CONTROL: ABNORMAL
MDMA UR QL SCN: NEGATIVE
METHADONE INTERNAL CONTROL: ABNORMAL
METHADONE UR QL SCN: NEGATIVE
METHAMPHETAMINE INTERNAL CONTROL: ABNORMAL
MORPHINE INTERNAL CONTROL: ABNORMAL
MORPHINE/OPIATES SCREEN, URINE: NEGATIVE
OXYCODONE INTERNAL CONTROL: ABNORMAL
OXYCODONE UR QL SCN: NEGATIVE
PCP UR QL SCN: NEGATIVE
PHENCYCLIDINE INTERNAL CONTROL: ABNORMAL
PROPOXYPH UR QL SCN: NEGATIVE
PROPOXYPHENE INTERNAL CONTROL: ABNORMAL
THC INTERNAL CONTROL: ABNORMAL
TRICYCLIC ANTIDEPRESSANTS INTERNAL CONTROL: ABNORMAL
TRICYCLICS UR QL SCN: NEGATIVE

## 2024-05-31 PROCEDURE — G2211 COMPLEX E/M VISIT ADD ON: HCPCS | Performed by: INTERNAL MEDICINE

## 2024-05-31 PROCEDURE — 3077F SYST BP >= 140 MM HG: CPT | Performed by: INTERNAL MEDICINE

## 2024-05-31 PROCEDURE — 3078F DIAST BP <80 MM HG: CPT | Performed by: INTERNAL MEDICINE

## 2024-05-31 PROCEDURE — 1126F AMNT PAIN NOTED NONE PRSNT: CPT | Performed by: INTERNAL MEDICINE

## 2024-05-31 PROCEDURE — 99214 OFFICE O/P EST MOD 30 MIN: CPT | Performed by: INTERNAL MEDICINE

## 2024-05-31 RX ORDER — CLONIDINE 0.2 MG/24H
1 PATCH, EXTENDED RELEASE TRANSDERMAL WEEKLY
COMMUNITY

## 2024-05-31 RX ORDER — MONTELUKAST SODIUM 10 MG/1
10 TABLET ORAL NIGHTLY
Qty: 90 TABLET | Refills: 3 | Status: SHIPPED | OUTPATIENT
Start: 2024-05-31

## 2024-05-31 RX ORDER — ERGOCALCIFEROL 1.25 MG/1
50000 CAPSULE ORAL WEEKLY
Qty: 12 CAPSULE | Refills: 3 | Status: SHIPPED | OUTPATIENT
Start: 2024-05-31

## 2024-05-31 NOTE — PROGRESS NOTES
"      Chief Complaint  Follow-up (3 month follow up)    Subjective        Ricardo Hugo presents to [facility]    HPI    Patient here for the above problems.  See Assessment and Plan for further HPI components.      Review of Systems    Objective   Vital Signs:  /54 (BP Location: Left arm, Patient Position: Sitting, Cuff Size: Adult)   Pulse 53   Temp 97.8 °F (36.6 °C) (Temporal)   Ht 182.9 cm (72.01\")   Wt 66.7 kg (147 lb)   SpO2 94%   BMI 19.93 kg/m²   Estimated body mass index is 19.93 kg/m² as calculated from the following:    Height as of this encounter: 182.9 cm (72.01\").    Weight as of this encounter: 66.7 kg (147 lb).      Physical Exam  Vitals and nursing note reviewed.   Constitutional:       Appearance: He is not ill-appearing.   Eyes:      General: No scleral icterus.     Conjunctiva/sclera: Conjunctivae normal.   Cardiovascular:      Rate and Rhythm: Normal rate and regular rhythm.   Pulmonary:      Effort: Pulmonary effort is normal. No respiratory distress.   Neurological:      General: No focal deficit present.      Mental Status: He is alert and oriented to person, place, and time.   Psychiatric:         Mood and Affect: Mood normal.         Behavior: Behavior normal.                       Assessment and Plan   Diagnoses and all orders for this visit:    1. Encounter for long-term current use of medication (Primary)  -     POC Medline 14 Panel Urine Drug Screen    2. Vitamin D deficiency  -     vitamin D (ERGOCALCIFEROL) 1.25 MG (19836 UT) capsule capsule; Take 1 capsule by mouth 1 (One) Time Per Week.  Dispense: 12 capsule; Refill: 3    3. Diastolic dysfunction    4. Resistant hypertension    5. Mixed hyperlipidemia    6. CLL (chronic lymphocytic leukemia)    7. Platelets decreased    8. Stage 2 moderate COPD by GOLD classification    9. PAD (peripheral artery disease)    10. Secondary hyperparathyroidism of renal origin    11. CRD (chronic renal " disease), stage IV        Patient has baseline thrombocytopenia.  Platelets are stable.  Continue to monitor.    Patient has secondary hyperparathyroidism due to his renal disease.  Defer to nephrology in regards to this.  Patient also has chronic kidney disease stage IV.  GFR was around 20 on the most recent check.  Previously been as low as 16.  Recommend we continue to maximize his blood pressure therapy.  Patient recently saw nephrology yesterday, they have increased his clonidine patch to 0.2 mg.  Recommend we continue this and get rid of the clonidine pills.  Patient does not need to be doubling up on this.  The patient continues to have very significant proteinuria.  This was drastically improved with an SGLT2 inhibitor.  However the SGLT2 inhibitor was started with a GFR too low by cardiology, we have subsequently discontinued this.  The patient is able to get a GFR greater than 25, and maintaining greater than 25, we will consider trying to reintroduce an SGLT2 inhibitor if the patient's insurance is able to assist with this.  The other 1 we may have to consider, but we will have to watch his potassium as we can consider adding spironolactone.  Given his low GFR, we would have to again watch his potassium very closely.  I would like to see how the 0.2 mg of clonidine does.  We can reintroduce the thought of spironolactone in the future.    Patient has a history of diastolic dysfunction.  Patient appears euvolemic.  Patient is stable on current diuretic dosing.  Patient is also on goal-directed therapy.  The addition of Aldactone again can be considered.    Insurance and pharmacy has been slowly getting his vitamin D.  I will send another refill in to see if we can expedite this.    Patient has a history of CLL.  Patient follows with otology.  No changes at the present time.  Patient has been stable.  Most recent labs reviewed.    Patient has a long history of anxiety.  PatientTakes a very low-dose of Xanax.   PDMP reviewed.  UDS today.  Appropriate.    Result Review :  The following data was reviewed by: Nathan Zimmer MD on 05/31/2024:  CMP          2/14/2024    09:37 5/8/2024    09:46 5/22/2024    08:49   CMP   Glucose 158  117  98    BUN 45  65  48    Creatinine 3.10  3.61  3.11    EGFR 20.2  16.7  20.0    Sodium 138  141  140    Potassium 4.3  4.4  4.1    Chloride 104  106  106    Calcium 8.5  8.8  8.6    Total Protein 6.5  6.5  6.4    Albumin 4.0  4.1  4.0    Globulin 2.5  2.4  2.4    Total Bilirubin 0.4  0.4  0.5    Alkaline Phosphatase 133  160  164    AST (SGOT) 18  16  21    ALT (SGPT) 13  11  13    Albumin/Globulin Ratio 1.6  1.7  1.7    BUN/Creatinine Ratio 14.5  18.0  15.4    Anion Gap 13.0  16.0  13.0       [facility] Reference Range & Units 05/22/24 08:49 05/22/24 08:56 05/31/24 11:29   Sodium 136 - 145 mmol/L 140     Potassium 3.5 - 5.2 mmol/L 4.1     Chloride 98 - 107 mmol/L 106     CO2 22.0 - 29.0 mmol/L 21.0 (L)     Anion Gap 5.0 - 15.0 mmol/L 13.0     BUN 8 - 23 mg/dL 48 (H)     Creatinine 0.76 - 1.27 mg/dL 3.11 (H)     BUN/Creatinine Ratio 7.0 - 25.0  15.4     eGFR >60.0 mL/min/1.73 20.0 (L)     Glucose 65 - 99 mg/dL 98     Calcium 8.6 - 10.5 mg/dL 8.6     Magnesium 1.6 - 2.4 mg/dL 1.9     Phosphorus 2.5 - 4.5 mg/dL 4.3     Alkaline Phosphatase 39 - 117 U/L 164 (H)     Total Protein 6.0 - 8.5 g/dL 6.4     Albumin 3.5 - 5.2 g/dL 4.0     Globulin gm/dL 2.4     A/G Ratio g/dL 1.7     AST (SGOT) 1 - 40 U/L 21     ALT (SGPT) 1 - 41 U/L 13     Total Bilirubin 0.0 - 1.2 mg/dL 0.5     Uric Acid 3.4 - 7.0 mg/dL 9.0 (H)     PTH Intact (Serial Monitor) 15.0 - 65.0 pg/mL 194.6 (H)     Total Cholesterol 0 - 200 mg/dL 90     HDL Cholesterol 40 - 60 mg/dL 37 (L)     LDL Cholesterol  0 - 100 mg/dL 40     VLDL Cholesterol 5 - 40 mg/dL 13     Triglycerides 0 - 150 mg/dL 57     LDL/HDL Ratio  1.12     25 Hydroxy, Vitamin D 30.0 - 100.0 ng/ml 68.1     WBC 3.40 - 10.80 10*3/mm3 4.64     RBC 4.14 - 5.80 10*6/mm3 3.45 (L)      Hemoglobin 13.0 - 17.7 g/dL 10.4 (L)     Hematocrit 37.5 - 51.0 % 34.4 (L)     Platelets 140 - 450 10*3/mm3 85 (L)     RDW 12.3 - 15.4 % 15.7 (H)     MCV 79.0 - 97.0 fL 99.7 (H)     MCH 26.6 - 33.0 pg 30.1     MCHC 31.5 - 35.7 g/dL 30.2 (L)     MPV 6.0 - 12.0 fL 10.2     RDW-SD 37.0 - 54.0 fl 57.1 (H)     Neutrophil Rel % 42.7 - 76.0 % 68.2     Lymphocyte Rel % 19.6 - 45.3 % 17.2 (L)     Monocyte Rel % 5.0 - 12.0 % 9.9     Eosinophil Rel % 0.3 - 6.2 % 3.2     Basophil Rel % 0.0 - 1.5 % 0.9     Immature Granulocyte Rel % 0.0 - 0.5 % 0.6 (H)     Neutrophils Absolute 1.70 - 7.00 10*3/mm3 3.16     Lymphocytes Absolute 0.70 - 3.10 10*3/mm3 0.80     Monocytes Absolute 0.10 - 0.90 10*3/mm3 0.46     Eosinophils Absolute 0.00 - 0.40 10*3/mm3 0.15     Basophils Absolute 0.00 - 0.20 10*3/mm3 0.04     Immature Grans, Absolute 0.00 - 0.05 10*3/mm3 0.03     nRBC 0.0 - 0.2 /100 WBC 0.0     Color, UA Yellow, Straw   Yellow    Appearance, UA Clear   Clear    Specific Gravity, UA 1.005 - 1.030   1.013    pH, UA 5.0 - 8.0   6.0    Glucose Negative   Negative    Ketones, UA Negative   Negative    Blood, UA Negative   Negative    Nitrite, UA Negative   Negative    Leukocytes, UA Negative   Negative    Protein, UA Negative   >=300 mg/dL (3+) !    Bilirubin, UA Negative   Negative    Urobilinogen, UA 0.2 - 1.0 E.U./dL   0.2 E.U./dL    RBC, UA None Seen, 0-2 /HPF  3-5 !    WBC, UA None Seen, 0-2 /HPF  0-2    Bacteria, UA None Seen /HPF  None Seen    Squamous Epithelial Cells, UA None Seen, 0-2 /HPF  None Seen    Hyaline Casts, UA None Seen /LPF  None Seen    Methodology:   Automated Microscopy    Creatinine, Urine mg/dL  53.5    Total Protein, Urine mg/dL  150.0    Protein/Creatinine Ratio 0.0 - 200.0 mg/G Crea  2,803.7 (H)    Expiration Date    11,122,025   Lot Number    [id number]   Barbiturates Screen, Urine Negative    Negative   Benzodiazepine Screen, Urine Negative    Positive !   Buprenorphine, Screen, Urine Negative    Negative    Cocaine Screen, Urine Negative    Negative   Methamphetamine, Ur Negative    Negative   Methadone Screen , Urine Negative    Negative   Oxycodone Screen, Urine Negative    Negative   Phencyclidine (PCP), Urine Negative    Negative   THC Screen, Urine Negative    Negative   Tricyclic Antidepressants Screen Negative    Negative   MDMA (ECSTASY) Negative    Negative   MOR INTERNAL CONTROL Passed    Passed   Morphine/Opiates Screen, Urine Negative    Negative   PPX INTERNAL CONTROL Passed    Passed   Propoxyphene Scree, Urine Negative    Negative   TCA INTERNAL CONTROL Passed    Passed   (L): Data is abnormally low  (H): Data is abnormally high  !: Data is abnormal       BMI is within normal parameters. No other follow-up for BMI required.      BMI is within normal parameters. No other follow-up for BMI required.            Follow Up   Return in about 3 months (around 8/31/2024), or if symptoms worsen or fail to improve, for longitudinal care.  Patient desires to follow-up with me every 3 months.  I think this is reasonable given the chronicity of his illnesses.  Patient was given instructions and counseling regarding his condition or for health maintenance advice. Please see specific information pulled into the AVS if appropriate.       MAHENDRA Zimmer MD, FACP, [facility]      Electronically signed by Nathan Zimmer MD, 05/31/24, 12:59 PM CDT.

## 2024-06-05 ENCOUNTER — LAB (OUTPATIENT)
Dept: LAB | Facility: HOSPITAL | Age: 74
End: 2024-06-05
Payer: MEDICARE

## 2024-06-05 ENCOUNTER — INFUSION (OUTPATIENT)
Dept: ONCOLOGY | Facility: HOSPITAL | Age: 74
End: 2024-06-05
Payer: MEDICARE

## 2024-06-05 VITALS
SYSTOLIC BLOOD PRESSURE: 121 MMHG | HEART RATE: 51 BPM | TEMPERATURE: 97.4 F | WEIGHT: 150.2 LBS | DIASTOLIC BLOOD PRESSURE: 50 MMHG | BODY MASS INDEX: 20.34 KG/M2 | OXYGEN SATURATION: 100 % | RESPIRATION RATE: 18 BRPM | HEIGHT: 72 IN

## 2024-06-05 DIAGNOSIS — D63.1 ANEMIA DUE TO STAGE 4 CHRONIC KIDNEY DISEASE: ICD-10-CM

## 2024-06-05 DIAGNOSIS — D63.1 ANEMIA DUE TO STAGE 4 CHRONIC KIDNEY DISEASE: Primary | ICD-10-CM

## 2024-06-05 DIAGNOSIS — N18.4 ANEMIA DUE TO STAGE 4 CHRONIC KIDNEY DISEASE: Primary | ICD-10-CM

## 2024-06-05 DIAGNOSIS — N18.4 ANEMIA DUE TO STAGE 4 CHRONIC KIDNEY DISEASE: ICD-10-CM

## 2024-06-05 LAB
BASOPHILS # BLD AUTO: 0.05 10*3/MM3 (ref 0–0.2)
BASOPHILS NFR BLD AUTO: 1.1 % (ref 0–1.5)
DEPRECATED RDW RBC AUTO: 59.2 FL (ref 37–54)
EOSINOPHIL # BLD AUTO: 0.2 10*3/MM3 (ref 0–0.4)
EOSINOPHIL NFR BLD AUTO: 4.5 % (ref 0.3–6.2)
ERYTHROCYTE [DISTWIDTH] IN BLOOD BY AUTOMATED COUNT: 16.4 % (ref 12.3–15.4)
HCT VFR BLD AUTO: 33.1 % (ref 37.5–51)
HGB BLD-MCNC: 10.4 G/DL (ref 13–17.7)
IMM GRANULOCYTES # BLD AUTO: 0.02 10*3/MM3 (ref 0–0.05)
IMM GRANULOCYTES NFR BLD AUTO: 0.5 % (ref 0–0.5)
LYMPHOCYTES # BLD AUTO: 0.94 10*3/MM3 (ref 0.7–3.1)
LYMPHOCYTES NFR BLD AUTO: 21.3 % (ref 19.6–45.3)
MCH RBC QN AUTO: 31 PG (ref 26.6–33)
MCHC RBC AUTO-ENTMCNC: 31.4 G/DL (ref 31.5–35.7)
MCV RBC AUTO: 98.5 FL (ref 79–97)
MONOCYTES # BLD AUTO: 0.47 10*3/MM3 (ref 0.1–0.9)
MONOCYTES NFR BLD AUTO: 10.7 % (ref 5–12)
NEUTROPHILS NFR BLD AUTO: 2.73 10*3/MM3 (ref 1.7–7)
NEUTROPHILS NFR BLD AUTO: 61.9 % (ref 42.7–76)
NRBC BLD AUTO-RTO: 0 /100 WBC (ref 0–0.2)
PLATELET # BLD AUTO: 101 10*3/MM3 (ref 140–450)
PMV BLD AUTO: 10.2 FL (ref 6–12)
RBC # BLD AUTO: 3.36 10*6/MM3 (ref 4.14–5.8)
WBC NRBC COR # BLD AUTO: 4.41 10*3/MM3 (ref 3.4–10.8)

## 2024-06-05 PROCEDURE — 25010000002 EPOETIN ALFA-EPBX 40000 UNIT/ML SOLUTION: Performed by: NURSE PRACTITIONER

## 2024-06-05 PROCEDURE — 36415 COLL VENOUS BLD VENIPUNCTURE: CPT

## 2024-06-05 PROCEDURE — 96372 THER/PROPH/DIAG INJ SC/IM: CPT

## 2024-06-05 PROCEDURE — 85025 COMPLETE CBC W/AUTO DIFF WBC: CPT

## 2024-06-05 RX ADMIN — EPOETIN ALFA-EPBX 40000 UNITS: 40000 INJECTION, SOLUTION INTRAVENOUS; SUBCUTANEOUS at 09:19

## 2024-06-19 ENCOUNTER — LAB (OUTPATIENT)
Dept: LAB | Facility: HOSPITAL | Age: 74
End: 2024-06-19
Payer: MEDICARE

## 2024-06-19 ENCOUNTER — INFUSION (OUTPATIENT)
Dept: ONCOLOGY | Facility: HOSPITAL | Age: 74
End: 2024-06-19
Payer: MEDICARE

## 2024-06-19 VITALS
HEART RATE: 52 BPM | RESPIRATION RATE: 18 BRPM | TEMPERATURE: 96.6 F | SYSTOLIC BLOOD PRESSURE: 179 MMHG | BODY MASS INDEX: 20.37 KG/M2 | WEIGHT: 150.4 LBS | DIASTOLIC BLOOD PRESSURE: 50 MMHG | HEIGHT: 72 IN | OXYGEN SATURATION: 100 %

## 2024-06-19 DIAGNOSIS — N18.4 ANEMIA DUE TO STAGE 4 CHRONIC KIDNEY DISEASE: Primary | ICD-10-CM

## 2024-06-19 DIAGNOSIS — D63.1 ANEMIA DUE TO STAGE 4 CHRONIC KIDNEY DISEASE: Primary | ICD-10-CM

## 2024-06-19 DIAGNOSIS — N18.4 ANEMIA DUE TO STAGE 4 CHRONIC KIDNEY DISEASE: ICD-10-CM

## 2024-06-19 DIAGNOSIS — D63.1 ANEMIA DUE TO STAGE 4 CHRONIC KIDNEY DISEASE: ICD-10-CM

## 2024-06-19 LAB
BASOPHILS # BLD AUTO: 0.05 10*3/MM3 (ref 0–0.2)
BASOPHILS NFR BLD AUTO: 0.9 % (ref 0–1.5)
DEPRECATED RDW RBC AUTO: 63.4 FL (ref 37–54)
EOSINOPHIL # BLD AUTO: 0.21 10*3/MM3 (ref 0–0.4)
EOSINOPHIL NFR BLD AUTO: 3.7 % (ref 0.3–6.2)
ERYTHROCYTE [DISTWIDTH] IN BLOOD BY AUTOMATED COUNT: 17.3 % (ref 12.3–15.4)
HCT VFR BLD AUTO: 31.4 % (ref 37.5–51)
HGB BLD-MCNC: 10 G/DL (ref 13–17.7)
IMM GRANULOCYTES # BLD AUTO: 0.05 10*3/MM3 (ref 0–0.05)
IMM GRANULOCYTES NFR BLD AUTO: 0.9 % (ref 0–0.5)
LYMPHOCYTES # BLD AUTO: 1.06 10*3/MM3 (ref 0.7–3.1)
LYMPHOCYTES NFR BLD AUTO: 18.8 % (ref 19.6–45.3)
MCH RBC QN AUTO: 31.7 PG (ref 26.6–33)
MCHC RBC AUTO-ENTMCNC: 31.8 G/DL (ref 31.5–35.7)
MCV RBC AUTO: 99.7 FL (ref 79–97)
MONOCYTES # BLD AUTO: 0.52 10*3/MM3 (ref 0.1–0.9)
MONOCYTES NFR BLD AUTO: 9.2 % (ref 5–12)
NEUTROPHILS NFR BLD AUTO: 3.75 10*3/MM3 (ref 1.7–7)
NEUTROPHILS NFR BLD AUTO: 66.5 % (ref 42.7–76)
NRBC BLD AUTO-RTO: 0 /100 WBC (ref 0–0.2)
PLATELET # BLD AUTO: 119 10*3/MM3 (ref 140–450)
PMV BLD AUTO: 10 FL (ref 6–12)
RBC # BLD AUTO: 3.15 10*6/MM3 (ref 4.14–5.8)
WBC NRBC COR # BLD AUTO: 5.64 10*3/MM3 (ref 3.4–10.8)

## 2024-06-19 PROCEDURE — 85025 COMPLETE CBC W/AUTO DIFF WBC: CPT

## 2024-06-19 PROCEDURE — 25010000002 EPOETIN ALFA-EPBX 40000 UNIT/ML SOLUTION: Performed by: NURSE PRACTITIONER

## 2024-06-19 PROCEDURE — 96372 THER/PROPH/DIAG INJ SC/IM: CPT

## 2024-06-19 PROCEDURE — 36415 COLL VENOUS BLD VENIPUNCTURE: CPT

## 2024-06-19 RX ADMIN — EPOETIN ALFA-EPBX 40000 UNITS: 40000 INJECTION, SOLUTION INTRAVENOUS; SUBCUTANEOUS at 10:21

## 2024-06-27 ENCOUNTER — OFFICE VISIT (OUTPATIENT)
Dept: OTOLARYNGOLOGY | Facility: CLINIC | Age: 74
End: 2024-06-27
Payer: MEDICARE

## 2024-06-27 VITALS — HEIGHT: 72 IN | TEMPERATURE: 98.7 F | WEIGHT: 149 LBS | BODY MASS INDEX: 20.18 KG/M2

## 2024-06-27 DIAGNOSIS — H69.93 DYSFUNCTION OF BOTH EUSTACHIAN TUBES: Primary | ICD-10-CM

## 2024-06-27 DIAGNOSIS — H72.92 PERFORATION OF LEFT TYMPANIC MEMBRANE: ICD-10-CM

## 2024-06-27 DIAGNOSIS — Z96.22 S/P MYRINGOTOMY WITH INSERTION OF TUBE: ICD-10-CM

## 2024-06-27 RX ORDER — CLONIDINE HYDROCHLORIDE 0.3 MG/1
0.3 TABLET ORAL 3 TIMES DAILY
COMMUNITY

## 2024-06-27 NOTE — PROGRESS NOTES
STERLING Brown  [facility]  [address], [address]  [place] [address]  Fax [phone]  Phone [phone]      Visit Type: FOLLOW UP   Chief Complaint   Patient presents with    Ear Tube Follow-up           HPI  Ricardo Hugo is a 74 y.o. male who presents status post myringotomy tube insertion. The patient has had: no related complaints. The patient denies pain, fever, change of hearing and otorrhea.    Past Medical History:   Diagnosis Date    3-vessel CAD 8/11/2020    Allergic rhinitis     Anxiety disorder 4/27/2020    Arthritis     Asymmetrical sensorineural hearing loss 6/28/2017    Atherosclerosis of native artery of both lower extremities with intermittent claudication 7/18/2019    Avascular necrosis of femoral head, left 07/11/2020    right hip after surgery    Carotid stenosis     Chronic mucoid otitis media     Chronic rhinitis     Coronary artery disease     HEART BYPASS 2004    Crohn's disease of large intestine with other complication 7/30/2020    Chronic diarrhea Colonoscopy July 2020 revealed mild patchy scattered hemosiderin staining with inflammation more so in rectosigmoid area.  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    Iron deficiency anemia 7/14/2020    Mixed hearing loss of left ear     PAD (peripheral artery disease) 8/11/2020    Perianal abscess     Pernicious anemia 08/17/2020    took shots but never diagnosed with b12 deficiency    Personal history of alcoholism 08/11/2020    quit drinking in 2013    Prostatic hypertrophy 8/11/2020    Sensorineural hearing  loss     Sepsis with acute renal failure 9/15/2020    Shortness of breath 5/27/2021    Tinnitus     Ventricular tachycardia, nonsustained 7/14/2020    Weight loss 7/11/2020       Past Surgical History:   Procedure Laterality Date    ARTERY SURGERY  2021    right side on neck    CAROTID ENDARTERECTOMY Right 5/10/2021    Procedure: RIGHT CAROTID ENDARTERECTOMY WITH EEG;  Surgeon: Gil Pineda DO;  Location: [facility] HYBRID OR 12;  Service: Vascular;  Laterality: Right;    COLONOSCOPY N/A 7/2/2020    Procedure: COLONOSCOPY WITH ANESTHESIA;  Surgeon: Adrien Brewster MD;  Location: [facility] ENDOSCOPY;  Service: Gastroenterology;  Laterality: N/A;  pre op: diarrhea  post op: polyps  PCP: Joe Velasco MD    COLONOSCOPY N/A 10/13/2020    Procedure: COLONOSCOPY WITH ANESTHESIA;  Surgeon: Adrien Brewster MD;  Location: [facility] ENDOSCOPY;  Service: Gastroenterology;  Laterality: N/A;  Pre: Chronic Diarrhea, Crohn's  Post: AVM  Dr. Neftali Velasco  CO2 Inflation Used    COLONOSCOPY N/A 12/8/2023    Procedure: COLONOSCOPY WITH ANESTHESIA;  Surgeon: Adrien Brewster MD;  Location: [facility] ENDOSCOPY;  Service: Gastroenterology;  Laterality: N/A;  pre chrone's disease  post sub optimal prep, polyp, chrone's      CORONARY ARTERY BYPASS GRAFT  2003    x3    ENDOSCOPY N/A 11/2/2021    Procedure: ESOPHAGOGASTRODUODENOSCOPY WITH ANESTHESIA;  Surgeon: Bridger Bell MD;  Location: [facility] ENDOSCOPY;  Service: Gastroenterology;  Laterality: N/A;  pre anemia;gi bleed  post  gi bleed;debbieki ring  Dr. ERIC Velasco    ENDOSCOPY N/A 10/10/2023    Procedure: ESOPHAGOGASTRODUODENOSCOPY WITH ANESTHESIA;  Surgeon: Adrien Brewster MD;  Location: [facility] ENDOSCOPY;  Service: Gastroenterology;  Laterality: N/A;  preop; anemia  postop esophagitis ; R/O barretts   PCP Randall Beata    EYE SURGERY Bilateral     catorac    INCISION AND DRAINAGE PERIRECTAL ABSCESS N/A 3/3/2017    Procedure: INCISION AND DRAINAGE OF JEET ANAL  ABSCESS;  Surgeon: Lynette Smith MD;  Location:  [facility] OR;  Service:     INGUINAL HERNIA REPAIR Bilateral 6/27/2023    Procedure: INGUINAL HERNIA BILATERAL REPAIR LAPAROSCOPIC WITH DAVINCI ROBOT WITH MESH;  Surgeon: Tahira Rivera MD;  Location:  [facility] OR;  Service: Robotics - DaVinci;  Laterality: Bilateral;    MYRINGOTOMY W/ TUBES Left 04/17/2017    06/10/2016    TONSILLECTOMY      TOTAL HIP ARTHROPLASTY Right 2006       Family History: His family history includes Breast cancer in his mother; Dementia in his father; Glaucoma in his father; No Known Problems in his daughter.     Social History: He  reports that he quit smoking about 10 years ago. His smoking use included cigarettes. He started smoking about 36 years ago. He has a 12.9 pack-year smoking history. He has been exposed to tobacco smoke. He has never used smokeless tobacco. He reports that he does not currently use alcohol. He reports that he does not use drugs.    Home Medications:  ALPRAZolam, Melatonin, NIFEdipine CC, Nasal Spray, PreserVision/Lutein, albuterol sulfate HFA, aspirin, atorvastatin, azelastine, carvedilol, cholestyramine, ciprofloxacin-dexAMETHasone, cloNIDine, clopidogrel, diphenhydrAMINE HCl (Sleep), fexofenadine, fluticasone, furosemide, hydrALAZINE, levothyroxine, magnesium chloride ER, mesalamine, montelukast, omeprazole, phenylephrine, potassium chloride, sodium bicarbonate, valsartan, and vitamin D    Allergies:  He is allergic to ondansetron, zofran [ondansetron hcl], lortab [hydrocodone-acetaminophen], and allopurinol.       Vital Signs:   Temp:  [98.7 °F (37.1 °C)] 98.7 °F (37.1 °C)  ENT Physical Exam  Ear  Hearing: intact;  Auricles: bilateral auricles normal;  Ear Canals: bilateral ear canals normal;  Tympanic Membranes: right tympanic membrane tympanostomy tube noted; normal tube; left tympanic membrane perforated; perforation perforation size: 50%;           Result Review       RESULTS REVIEW    I have reviewed the  patients old records in the chart.        Assessment & Plan  Dysfunction of both eustachian tubes    Perforation of left tympanic membrane    S/P myringotomy with insertion of tube              Protect getting water in the ears. If needed, may use over the counter silicone plugs or a cotton ball coated with vasoline when bathing.  Use hairdryer on a cool setting after bathing.  For proper use of ear drops, push on tragus (cartilage in front of ear canal) after drop placement.  Return in about 6 months (around 12/27/2024) for Follow up with STERLING Brown, for tube follow up.        Electronically signed by STERLING Brown 06/27/24 9:07 AM CDT.

## 2024-07-01 RX ORDER — CLOPIDOGREL BISULFATE 75 MG/1
75 TABLET ORAL DAILY
Qty: 90 TABLET | Refills: 3 | Status: SHIPPED | OUTPATIENT
Start: 2024-07-01

## 2024-07-03 ENCOUNTER — LAB (OUTPATIENT)
Dept: LAB | Facility: HOSPITAL | Age: 74
End: 2024-07-03
Payer: MEDICARE

## 2024-07-03 ENCOUNTER — INFUSION (OUTPATIENT)
Dept: ONCOLOGY | Facility: HOSPITAL | Age: 74
End: 2024-07-03
Payer: MEDICARE

## 2024-07-03 VITALS
HEART RATE: 53 BPM | RESPIRATION RATE: 16 BRPM | BODY MASS INDEX: 20.32 KG/M2 | SYSTOLIC BLOOD PRESSURE: 141 MMHG | HEIGHT: 72 IN | WEIGHT: 150 LBS | OXYGEN SATURATION: 95 % | DIASTOLIC BLOOD PRESSURE: 52 MMHG | TEMPERATURE: 97.3 F

## 2024-07-03 DIAGNOSIS — D63.1 ANEMIA DUE TO STAGE 4 CHRONIC KIDNEY DISEASE: ICD-10-CM

## 2024-07-03 DIAGNOSIS — D63.1 ANEMIA DUE TO STAGE 4 CHRONIC KIDNEY DISEASE: Primary | ICD-10-CM

## 2024-07-03 DIAGNOSIS — N18.4 ANEMIA DUE TO STAGE 4 CHRONIC KIDNEY DISEASE: ICD-10-CM

## 2024-07-03 DIAGNOSIS — N18.4 ANEMIA DUE TO STAGE 4 CHRONIC KIDNEY DISEASE: Primary | ICD-10-CM

## 2024-07-03 LAB
BASOPHILS # BLD AUTO: 0.07 10*3/MM3 (ref 0–0.2)
BASOPHILS NFR BLD AUTO: 1 % (ref 0–1.5)
DEPRECATED RDW RBC AUTO: 60.9 FL (ref 37–54)
EOSINOPHIL # BLD AUTO: 0.27 10*3/MM3 (ref 0–0.4)
EOSINOPHIL NFR BLD AUTO: 4 % (ref 0.3–6.2)
ERYTHROCYTE [DISTWIDTH] IN BLOOD BY AUTOMATED COUNT: 16.9 % (ref 12.3–15.4)
HCT VFR BLD AUTO: 31.2 % (ref 37.5–51)
HGB BLD-MCNC: 9.8 G/DL (ref 13–17.7)
HOLD SPECIMEN: NORMAL
IMM GRANULOCYTES # BLD AUTO: 0.03 10*3/MM3 (ref 0–0.05)
IMM GRANULOCYTES NFR BLD AUTO: 0.4 % (ref 0–0.5)
LYMPHOCYTES # BLD AUTO: 1.13 10*3/MM3 (ref 0.7–3.1)
LYMPHOCYTES NFR BLD AUTO: 16.8 % (ref 19.6–45.3)
MCH RBC QN AUTO: 31.3 PG (ref 26.6–33)
MCHC RBC AUTO-ENTMCNC: 31.4 G/DL (ref 31.5–35.7)
MCV RBC AUTO: 99.7 FL (ref 79–97)
MONOCYTES # BLD AUTO: 0.69 10*3/MM3 (ref 0.1–0.9)
MONOCYTES NFR BLD AUTO: 10.3 % (ref 5–12)
NEUTROPHILS NFR BLD AUTO: 4.52 10*3/MM3 (ref 1.7–7)
NEUTROPHILS NFR BLD AUTO: 67.5 % (ref 42.7–76)
NRBC BLD AUTO-RTO: 0 /100 WBC (ref 0–0.2)
PLATELET # BLD AUTO: 112 10*3/MM3 (ref 140–450)
PMV BLD AUTO: 9.5 FL (ref 6–12)
RBC # BLD AUTO: 3.13 10*6/MM3 (ref 4.14–5.8)
WBC NRBC COR # BLD AUTO: 6.71 10*3/MM3 (ref 3.4–10.8)

## 2024-07-03 PROCEDURE — 36415 COLL VENOUS BLD VENIPUNCTURE: CPT

## 2024-07-03 PROCEDURE — 25010000002 EPOETIN ALFA-EPBX 40000 UNIT/ML SOLUTION: Performed by: NURSE PRACTITIONER

## 2024-07-03 PROCEDURE — 85025 COMPLETE CBC W/AUTO DIFF WBC: CPT

## 2024-07-03 PROCEDURE — 96372 THER/PROPH/DIAG INJ SC/IM: CPT

## 2024-07-03 RX ADMIN — EPOETIN ALFA-EPBX 40000 UNITS: 40000 INJECTION, SOLUTION INTRAVENOUS; SUBCUTANEOUS at 09:07

## 2024-07-17 ENCOUNTER — INFUSION (OUTPATIENT)
Dept: ONCOLOGY | Facility: HOSPITAL | Age: 74
End: 2024-07-17
Payer: MEDICARE

## 2024-07-17 ENCOUNTER — LAB (OUTPATIENT)
Dept: LAB | Facility: HOSPITAL | Age: 74
End: 2024-07-17
Payer: MEDICARE

## 2024-07-17 VITALS
HEIGHT: 72 IN | HEART RATE: 52 BPM | TEMPERATURE: 96.6 F | WEIGHT: 151.6 LBS | SYSTOLIC BLOOD PRESSURE: 124 MMHG | BODY MASS INDEX: 20.53 KG/M2 | DIASTOLIC BLOOD PRESSURE: 48 MMHG | RESPIRATION RATE: 20 BRPM | OXYGEN SATURATION: 95 %

## 2024-07-17 DIAGNOSIS — D63.1 ANEMIA DUE TO STAGE 4 CHRONIC KIDNEY DISEASE: Primary | ICD-10-CM

## 2024-07-17 DIAGNOSIS — N18.4 ANEMIA DUE TO STAGE 4 CHRONIC KIDNEY DISEASE: ICD-10-CM

## 2024-07-17 DIAGNOSIS — N18.4 ANEMIA DUE TO STAGE 4 CHRONIC KIDNEY DISEASE: Primary | ICD-10-CM

## 2024-07-17 DIAGNOSIS — D63.1 ANEMIA DUE TO STAGE 4 CHRONIC KIDNEY DISEASE: ICD-10-CM

## 2024-07-17 LAB
BASOPHILS # BLD AUTO: 0.05 10*3/MM3 (ref 0–0.2)
BASOPHILS NFR BLD AUTO: 1 % (ref 0–1.5)
DEPRECATED RDW RBC AUTO: 59.7 FL (ref 37–54)
EOSINOPHIL # BLD AUTO: 0.18 10*3/MM3 (ref 0–0.4)
EOSINOPHIL NFR BLD AUTO: 3.4 % (ref 0.3–6.2)
ERYTHROCYTE [DISTWIDTH] IN BLOOD BY AUTOMATED COUNT: 16.4 % (ref 12.3–15.4)
HCT VFR BLD AUTO: 30.9 % (ref 37.5–51)
HGB BLD-MCNC: 9.9 G/DL (ref 13–17.7)
IMM GRANULOCYTES # BLD AUTO: 0.03 10*3/MM3 (ref 0–0.05)
IMM GRANULOCYTES NFR BLD AUTO: 0.6 % (ref 0–0.5)
LYMPHOCYTES # BLD AUTO: 0.92 10*3/MM3 (ref 0.7–3.1)
LYMPHOCYTES NFR BLD AUTO: 17.5 % (ref 19.6–45.3)
MCH RBC QN AUTO: 31.9 PG (ref 26.6–33)
MCHC RBC AUTO-ENTMCNC: 32 G/DL (ref 31.5–35.7)
MCV RBC AUTO: 99.7 FL (ref 79–97)
MONOCYTES # BLD AUTO: 0.62 10*3/MM3 (ref 0.1–0.9)
MONOCYTES NFR BLD AUTO: 11.8 % (ref 5–12)
NEUTROPHILS NFR BLD AUTO: 3.46 10*3/MM3 (ref 1.7–7)
NEUTROPHILS NFR BLD AUTO: 65.7 % (ref 42.7–76)
NRBC BLD AUTO-RTO: 0 /100 WBC (ref 0–0.2)
PLATELET # BLD AUTO: 96 10*3/MM3 (ref 140–450)
PMV BLD AUTO: 10.3 FL (ref 6–12)
RBC # BLD AUTO: 3.1 10*6/MM3 (ref 4.14–5.8)
WBC NRBC COR # BLD AUTO: 5.26 10*3/MM3 (ref 3.4–10.8)

## 2024-07-17 PROCEDURE — 25010000002 EPOETIN ALFA-EPBX 40000 UNIT/ML SOLUTION: Performed by: NURSE PRACTITIONER

## 2024-07-17 PROCEDURE — 96372 THER/PROPH/DIAG INJ SC/IM: CPT

## 2024-07-17 PROCEDURE — 85025 COMPLETE CBC W/AUTO DIFF WBC: CPT

## 2024-07-17 PROCEDURE — 36415 COLL VENOUS BLD VENIPUNCTURE: CPT

## 2024-07-17 RX ADMIN — EPOETIN ALFA-EPBX 40000 UNITS: 40000 INJECTION, SOLUTION INTRAVENOUS; SUBCUTANEOUS at 09:33

## 2024-07-18 RX ORDER — ALBUTEROL SULFATE 90 UG/1
AEROSOL, METERED RESPIRATORY (INHALATION)
Qty: 17 G | Refills: 6 | Status: SHIPPED | OUTPATIENT
Start: 2024-07-18

## 2024-08-07 ENCOUNTER — LAB (OUTPATIENT)
Dept: LAB | Facility: HOSPITAL | Age: 74
End: 2024-08-07
Payer: MEDICARE

## 2024-08-07 ENCOUNTER — OFFICE VISIT (OUTPATIENT)
Dept: ONCOLOGY | Facility: CLINIC | Age: 74
End: 2024-08-07
Payer: MEDICARE

## 2024-08-07 ENCOUNTER — INFUSION (OUTPATIENT)
Dept: ONCOLOGY | Facility: HOSPITAL | Age: 74
End: 2024-08-07
Payer: MEDICARE

## 2024-08-07 VITALS
HEIGHT: 72 IN | BODY MASS INDEX: 19.86 KG/M2 | DIASTOLIC BLOOD PRESSURE: 52 MMHG | WEIGHT: 146.6 LBS | RESPIRATION RATE: 16 BRPM | TEMPERATURE: 97.4 F | HEART RATE: 50 BPM | SYSTOLIC BLOOD PRESSURE: 161 MMHG | OXYGEN SATURATION: 97 %

## 2024-08-07 VITALS
BODY MASS INDEX: 22.27 KG/M2 | OXYGEN SATURATION: 97 % | HEART RATE: 50 BPM | DIASTOLIC BLOOD PRESSURE: 60 MMHG | WEIGHT: 164.4 LBS | TEMPERATURE: 97.5 F | HEIGHT: 72 IN | SYSTOLIC BLOOD PRESSURE: 150 MMHG

## 2024-08-07 DIAGNOSIS — C91.10 CLL (CHRONIC LYMPHOCYTIC LEUKEMIA): Primary | ICD-10-CM

## 2024-08-07 DIAGNOSIS — D63.1 ANEMIA DUE TO STAGE 4 CHRONIC KIDNEY DISEASE: ICD-10-CM

## 2024-08-07 DIAGNOSIS — N18.4 ANEMIA DUE TO STAGE 4 CHRONIC KIDNEY DISEASE: Primary | ICD-10-CM

## 2024-08-07 DIAGNOSIS — N18.4 ANEMIA DUE TO STAGE 4 CHRONIC KIDNEY DISEASE: ICD-10-CM

## 2024-08-07 DIAGNOSIS — D63.1 ANEMIA DUE TO STAGE 4 CHRONIC KIDNEY DISEASE: Primary | ICD-10-CM

## 2024-08-07 DIAGNOSIS — D50.9 IRON DEFICIENCY ANEMIA, UNSPECIFIED IRON DEFICIENCY ANEMIA TYPE: ICD-10-CM

## 2024-08-07 DIAGNOSIS — D69.6 THROMBOCYTOPENIA: ICD-10-CM

## 2024-08-07 DIAGNOSIS — K74.60 HEPATIC CIRRHOSIS, UNSPECIFIED HEPATIC CIRRHOSIS TYPE, UNSPECIFIED WHETHER ASCITES PRESENT: ICD-10-CM

## 2024-08-07 LAB
ALBUMIN SERPL-MCNC: 3.8 G/DL (ref 3.5–5.2)
ALBUMIN/GLOB SERPL: 1.4 G/DL
ALP SERPL-CCNC: 186 U/L (ref 39–117)
ALT SERPL W P-5'-P-CCNC: 12 U/L (ref 1–41)
ANION GAP SERPL CALCULATED.3IONS-SCNC: 16 MMOL/L (ref 5–15)
AST SERPL-CCNC: 16 U/L (ref 1–40)
BASOPHILS # BLD AUTO: 0.04 10*3/MM3 (ref 0–0.2)
BASOPHILS NFR BLD AUTO: 0.9 % (ref 0–1.5)
BILIRUB SERPL-MCNC: 0.3 MG/DL (ref 0–1.2)
BUN SERPL-MCNC: 74 MG/DL (ref 8–23)
BUN/CREAT SERPL: 21.3 (ref 7–25)
CALCIUM SPEC-SCNC: 8.9 MG/DL (ref 8.6–10.5)
CHLORIDE SERPL-SCNC: 103 MMOL/L (ref 98–107)
CO2 SERPL-SCNC: 20 MMOL/L (ref 22–29)
CREAT SERPL-MCNC: 3.47 MG/DL (ref 0.76–1.27)
DEPRECATED RDW RBC AUTO: 55.9 FL (ref 37–54)
EGFRCR SERPLBLD CKD-EPI 2021: 17.5 ML/MIN/1.73
EOSINOPHIL # BLD AUTO: 0.13 10*3/MM3 (ref 0–0.4)
EOSINOPHIL NFR BLD AUTO: 2.9 % (ref 0.3–6.2)
ERYTHROCYTE [DISTWIDTH] IN BLOOD BY AUTOMATED COUNT: 15.3 % (ref 12.3–15.4)
FERRITIN SERPL-MCNC: 249.1 NG/ML (ref 30–400)
GLOBULIN UR ELPH-MCNC: 2.8 GM/DL
GLUCOSE SERPL-MCNC: 146 MG/DL (ref 65–99)
HCT VFR BLD AUTO: 29.7 % (ref 37.5–51)
HGB BLD-MCNC: 9.5 G/DL (ref 13–17.7)
HOLD SPECIMEN: NORMAL
IMM GRANULOCYTES # BLD AUTO: 0.01 10*3/MM3 (ref 0–0.05)
IMM GRANULOCYTES NFR BLD AUTO: 0.2 % (ref 0–0.5)
IRON 24H UR-MRATE: 95 MCG/DL (ref 59–158)
IRON SATN MFR SERPL: 34 % (ref 20–50)
LDH SERPL-CCNC: 191 U/L (ref 135–225)
LYMPHOCYTES # BLD AUTO: 0.75 10*3/MM3 (ref 0.7–3.1)
LYMPHOCYTES NFR BLD AUTO: 16.7 % (ref 19.6–45.3)
MCH RBC QN AUTO: 31.9 PG (ref 26.6–33)
MCHC RBC AUTO-ENTMCNC: 32 G/DL (ref 31.5–35.7)
MCV RBC AUTO: 99.7 FL (ref 79–97)
MONOCYTES # BLD AUTO: 0.49 10*3/MM3 (ref 0.1–0.9)
MONOCYTES NFR BLD AUTO: 10.9 % (ref 5–12)
NEUTROPHILS NFR BLD AUTO: 3.08 10*3/MM3 (ref 1.7–7)
NEUTROPHILS NFR BLD AUTO: 68.4 % (ref 42.7–76)
NRBC BLD AUTO-RTO: 0 /100 WBC (ref 0–0.2)
PLATELET # BLD AUTO: 99 10*3/MM3 (ref 140–450)
PMV BLD AUTO: 10.6 FL (ref 6–12)
POTASSIUM SERPL-SCNC: 4.6 MMOL/L (ref 3.5–5.2)
PROT SERPL-MCNC: 6.6 G/DL (ref 6–8.5)
RBC # BLD AUTO: 2.98 10*6/MM3 (ref 4.14–5.8)
SODIUM SERPL-SCNC: 139 MMOL/L (ref 136–145)
TIBC SERPL-MCNC: 282 MCG/DL (ref 298–536)
TRANSFERRIN SERPL-MCNC: 189 MG/DL (ref 200–360)
WBC NRBC COR # BLD AUTO: 4.5 10*3/MM3 (ref 3.4–10.8)

## 2024-08-07 PROCEDURE — 83540 ASSAY OF IRON: CPT

## 2024-08-07 PROCEDURE — 85025 COMPLETE CBC W/AUTO DIFF WBC: CPT

## 2024-08-07 PROCEDURE — 83615 LACTATE (LD) (LDH) ENZYME: CPT

## 2024-08-07 PROCEDURE — 25010000002 EPOETIN ALFA-EPBX 40000 UNIT/ML SOLUTION: Performed by: NURSE PRACTITIONER

## 2024-08-07 PROCEDURE — 96372 THER/PROPH/DIAG INJ SC/IM: CPT

## 2024-08-07 PROCEDURE — 80053 COMPREHEN METABOLIC PANEL: CPT

## 2024-08-07 PROCEDURE — 36415 COLL VENOUS BLD VENIPUNCTURE: CPT

## 2024-08-07 PROCEDURE — 82728 ASSAY OF FERRITIN: CPT

## 2024-08-07 PROCEDURE — 84466 ASSAY OF TRANSFERRIN: CPT

## 2024-08-07 RX ADMIN — EPOETIN ALFA-EPBX 40000 UNITS: 40000 INJECTION, SOLUTION INTRAVENOUS; SUBCUTANEOUS at 09:40

## 2024-08-07 NOTE — PROGRESS NOTES
[facility]ATOLOGY & ONCOLOGY  [address]  [place] [address] [phone]    Patient Name: Ricardo Hugo  Encounter Date: 08/07/2024  YOB: 1950   Patient Number: [id number]    Hematology / Oncology Progress Note      HPI / REASON FOR VISIT: Ricardo Hugo is a 74 y.o. male who is followed by this office for CLL, Iron Deficiency Anemia, Chronic Kidney Disease.  He sees Dr. Rueda for nephrology.      Bone marrow biopsy done 7/12/2021:  with a flow cytometry showing a 2% monoclonal B-cell kappa population consistent with CLL/SLL.    CLL panel:  Negative for a t (11;14)/CCND1-IGH rearrangement  Negative for deletion of MARIA on the long arm of chromosome 11 q. 22  Negative for trisomy 12  Negative for deletion of DLEU1, DLEU2 on the long arm of chromosome 13 q. 14 and monosomy 13  Negative for deletion T p53 in the short arm of chromosome 17 P 13  Cytogenetics showed normal male karyotype  Bone marrow biopsy and aspirate show involvement by chronic lymphocytic leukemia/small lymphocytic lymphoma and up to 5% of cellularity, mild hypercellular marrow with maturing trilineage hematopoiesis, erythroid hyperplasia and a mild atypical small lymphocytic infiltrate with a dominant nodular pattern  Storage iron is present    He has received Injectafer for WALE and Retacrit for anemia in CKD in the past.    INTERVAL HISTORY       History of Present Illness  The patient is a 74-year-old male who presents for evaluation of iron deficiency, anemia, chronic kidney disease, and Chronic Lymphocytic Leukemia (CLL).    He reports feeling well overall with no specific issues. He receives Retacrit injections biweekly. He denies experiencing any bleeding, fever, or night sweats.    He had labs drawn and results were reviewed with him in office.     LABS    Lab Results - Last 18 Months   Lab Units 08/07/24  0850 07/17/24  0854 07/03/24  0851  06/19/24  0916 06/05/24  0857 05/22/24  0849 08/03/23  0829 07/18/23  1038   HEMOGLOBIN g/dL 9.5* 9.9* 9.8* 10.0* 10.4* 10.4*   < > 7.0*   HEMATOCRIT % 29.7* 30.9* 31.2* 31.4* 33.1* 34.4*   < > 23.4*   MCV fL 99.7* 99.7* 99.7* 99.7* 98.5* 99.7*   < > 105.4*   WBC 10*3/mm3 4.50 5.26 6.71 5.64 4.41 4.64   < > 5.13   RDW % 15.3 16.4* 16.9* 17.3* 16.4* 15.7*   < > 17.2*   MPV fL 10.6 10.3 9.5 10.0 10.2 10.2   < > 10.0   PLATELETS 10*3/mm3 99* 96* 112* 119* 101* 85*   < > 122*   IMM GRAN % % 0.2 0.6* 0.4 0.9* 0.5 0.6*   < >  --    NEUTROS ABS 10*3/mm3 3.08 3.46 4.52 3.75 2.73 3.16   < > 4.40   LYMPHS ABS 10*3/mm3 0.75 0.92 1.13 1.06 0.94 0.80   < >  --    MONOS ABS 10*3/mm3 0.49 0.62 0.69 0.52 0.47 0.46   < >  --    EOS ABS 10*3/mm3 0.13 0.18 0.27 0.21 0.20 0.15   < >  --    BASOS ABS 10*3/mm3 0.04 0.05 0.07 0.05 0.05 0.04   < >  --    IMMATURE GRANS (ABS) 10*3/mm3 0.01 0.03 0.03 0.05 0.02 0.03   < >  --    NRBC /100 WBC 0.0 0.0 0.0 0.0 0.0 0.0   < >  --    NEUTROPHIL % %  --   --   --   --   --   --   --  84.7*   MONOCYTES % %  --   --   --   --   --   --   --  4.1*    < > = values in this interval not displayed.       Lab Results - Last 18 Months   Lab Units 08/07/24  0850 05/22/24  0849 05/08/24  0946 02/14/24  0937 11/22/23  0918 11/08/23  0827   GLUCOSE mg/dL 146* 98 117* 158* 119* 109*   SODIUM mmol/L 139 140 141 138 139 136   POTASSIUM mmol/L 4.6 4.1 4.4 4.3 4.4 4.6   CO2 mmol/L 20.0* 21.0* 19.0* 21.0* 19.0* 17.0*   CHLORIDE mmol/L 103 106 106 104 106 104   ANION GAP mmol/L 16.0* 13.0 16.0* 13.0 14.0 15.0   CREATININE mg/dL 3.47* 3.11* 3.61* 3.10* 3.31* 3.17*   BUN mg/dL 74* 48* 65* 45* 56* 53*   BUN / CREAT RATIO  21.3 15.4 18.0 14.5 16.9 16.7   CALCIUM mg/dL 8.9 8.6 8.8 8.5* 8.3* 8.7   ALK PHOS U/L 186* 164* 160* 133* 173* 151*   TOTAL PROTEIN g/dL 6.6 6.4 6.5 6.5 6.8 6.4   ALT (SGPT) U/L 12 13 11 13 11 11   AST (SGOT) U/L 16 21 16 18 16 18   BILIRUBIN mg/dL 0.3 0.5 0.4 0.4 0.3 0.4   ALBUMIN g/dL 3.8 4.0 4.1 4.0  4.0 3.8   GLOBULIN gm/dL 2.8 2.4 2.4 2.5 2.8 2.6       Lab Results - Last 18 Months   Lab Units 08/07/24  0850 05/22/24  0849 02/14/24  0937 11/08/23  0827   URIC ACID mg/dL  --  9.0* 8.0* 7.5*   LDH U/L 191  --   --   --          Lab Results - Last 18 Months   Lab Units 08/07/24  0850 05/08/24  0946 02/28/24  0850 02/14/24  0937 11/22/23  0918 11/08/23  0827 08/03/23  0829 07/18/23  1038   IRON mcg/dL 95 87  --  89 79 51* 67 69   TIBC mcg/dL 282* 285*  --  235* 244* 235* 244* 247*   IRON SATURATION (TSAT) % 34 31  --  38 32 22 27 28   FERRITIN ng/mL 249.10 170.40  --  315.30 218.70 256.60  --  548.80*   TSH uIU/mL  --   --  5.790*  --   --   --   --   --          PAST MEDICAL HISTORY:  ALLERGIES:  Allergies   Allergen Reactions    Ondansetron Anaphylaxis and GI Intolerance    Zofran [Ondansetron Hcl] Anaphylaxis    Lortab [Hydrocodone-Acetaminophen] Other (See Comments) and Hallucinations     CLOSTROPHOBIC    Allopurinol Other (See Comments)     Pain on right side     CURRENT MEDICATIONS:  Outpatient Encounter Medications as of 8/7/2024   Medication Sig Dispense Refill    albuterol sulfate  (90 Base) MCG/ACT inhaler USE 2 INHALATIONS FOUR TIMES A DAY AS NEEDED FOR SHORTNESS OF AIR OR WHEEZING 17 g 6    ALPRAZolam (XANAX) 0.25 MG tablet Take 1 tablet by mouth Every Night. 90 tablet 1    aspirin (aspirin) 81 MG EC tablet Take  by mouth Daily.      atorvastatin (LIPITOR) 10 MG tablet Take 1 tablet by mouth Daily. 90 tablet 3    azelastine (ASTELIN) 0.1 % nasal spray USE 1 SPRAY IN EACH NOSTRIL TWICE A DAY AS NEEDED 90 mL 1    carvedilol (COREG) 25 MG tablet Take 1 tablet by mouth 2 (Two) Times a Day With Meals. 180 tablet 3    cholestyramine (QUESTRAN) 4 g packet MIX AND DRINK 1 PACKET DAILY 90 packet 3    ciprofloxacin-dexAMETHasone (Ciprodex) 0.3-0.1 % otic suspension Administer 4 drops to the right ear 2 (Two) Times a Day. 7.5 mL 0    cloNIDine (CATAPRES) 0.3 MG tablet Take 1 tablet by mouth 3 (Three) Times  a Day.      cloNIDine (CATAPRES-TTS) 0.2 MG/24HR patch Place 1 patch on the skin as directed by provider 1 (One) Time Per Week.      clopidogrel (PLAVIX) 75 MG tablet TAKE 1 TABLET DAILY 90 tablet 3    diphenhydrAMINE HCl, Sleep, (ZzzQuil) 25 MG capsule Take  by mouth Every Night.      fexofenadine (ALLEGRA) 180 MG tablet Take 1 tablet by mouth Daily.      fluticasone (FLONASE) 50 MCG/ACT nasal spray USE 2 SPRAYS INTO THE  NOSTRIL(S) AS DIRECTED BY PROVIDER DAILY AS NEEDED FOR RHINITIS 48 g 3    furosemide (Lasix) 20 MG tablet Take 1 tablet by mouth 2 (Two) Times a Day. 180 tablet 3    hydrALAZINE (APRESOLINE) 100 MG tablet Take 1 tablet by mouth 3 (Three) Times a Day. 100mg daily      levothyroxine (Synthroid) 75 MCG tablet Take 1 tablet by mouth Daily. 90 tablet 3    magnesium chloride ER 64 MG DR tablet Take 143 mg by mouth Daily.      Melatonin 10 MG capsule Take 2 capsules by mouth Every Night.      mesalamine (APRISO) 0.375 g 24 hr capsule TAKE 4 CAPSULES DAILY 360 capsule 3    montelukast (SINGULAIR) 10 MG tablet TAKE 1 TABLET EVERY NIGHT 90 tablet 3    Multiple Vitamins-Minerals (PRESERVISION/LUTEIN) capsule Take 1 capsule by mouth 2 (two) times a day.      NIFEdipine CC (ADALAT CC) 90 MG 24 hr tablet Take 1 tablet by mouth Daily. 90 tablet 3    omeprazole (priLOSEC) 20 MG capsule TAKE 1 CAPSULE DAILY 90 capsule 1    Oxymetazoline HCl (Nasal Spray) 0.05 % solution       phenylephrine (MAYELA-SYNEPHRINE) 1 % nasal spray 1 spray into the nostril(s) as directed by provider Daily As Needed (ear bleeding). 1 each 0    potassium chloride 10 MEQ CR tablet TAKE 1 TABLET TWICE A  tablet 3    sodium bicarbonate 650 MG tablet 2 qam, 1 at noon, and 2 qpm 450 tablet 3    valsartan (DIOVAN) 320 MG tablet Take 1 tablet by mouth Daily.      vitamin D (ERGOCALCIFEROL) 1.25 MG (46741 UT) capsule capsule Take 1 capsule by mouth 1 (One) Time Per Week. 12 capsule 3     Facility-Administered Encounter Medications as of  8/7/2024   Medication Dose Route Frequency Provider Last Rate Last Admin    cyanocobalamin injection 1,000 mcg  1,000 mcg Intramuscular Q28 Days Ruthie Parra APRN   1,000 mcg at 08/11/23 1123     ADULT ILLNESSES:  Patient Active Problem List   Diagnosis Code    Chronic rhinitis J31.0    Resistant hypertension I1A.0    Chronic diarrhea K52.9    Symptomatic anemia D64.9    Wellness examination Z00.00    PAD (peripheral artery disease) I73.9    IHD (ischemic heart disease) I25.9    Carotid stenosis I65.29    Stenosis of right carotid artery I65.21    Carotid stenosis, right I65.21    Diastolic dysfunction I51.89    CRD (chronic renal disease), stage IV N18.4    Anemia due to chronic kidney disease N18.9, D63.1    Copper deficiency E61.0    Low iron  E61.1    CLL (chronic lymphocytic leukemia) C91.10    Perforation of left tympanic membrane H72.92    Mixed hyperlipidemia E78.2    Systolic murmur R01.1    Eustachian tube dysfunction, bilateral H69.93    Sensorineural hearing loss (SNHL), bilateral H90.3    Anticoagulated Z79.01    Nasal septal deviation J34.2    Hypertrophy of inferior nasal turbinate J34.3    Unilateral recurrent inguinal hernia without obstruction or gangrene K40.91    Bilateral inguinal hernia without obstruction or gangrene K40.20    Sleep difficulties G47.9     SURGERIES:  Past Surgical History:   Procedure Laterality Date    ARTERY SURGERY  2021    right side on neck    CAROTID ENDARTERECTOMY Right 5/10/2021    Procedure: RIGHT CAROTID ENDARTERECTOMY WITH EEG;  Surgeon: Gil Pineda DO;  Location: [facility] OR 12;  Service: Vascular;  Laterality: Right;    COLONOSCOPY N/A 7/2/2020    Procedure: COLONOSCOPY WITH ANESTHESIA;  Surgeon: Adrien Brewster MD;  Location: [facility] ENDOSCOPY;  Service: Gastroenterology;  Laterality: N/A;  pre op: diarrhea  post op: polyps  PCP: Joe Velasco MD    COLONOSCOPY N/A 10/13/2020    Procedure: COLONOSCOPY WITH ANESTHESIA;  Surgeon: Ney  Adrien HERNÁNDEZ MD;  Location: [facility] ENDOSCOPY;  Service: Gastroenterology;  Laterality: N/A;  Pre: Chronic Diarrhea, Crohn's  Post: AVM  Dr. Neftali Velasco  CO2 Inflation Used    COLONOSCOPY N/A 12/8/2023    Procedure: COLONOSCOPY WITH ANESTHESIA;  Surgeon: Adrien Brewster MD;  Location: [facility] ENDOSCOPY;  Service: Gastroenterology;  Laterality: N/A;  pre chrone's disease  post sub optimal prep, polyp, chrone's      CORONARY ARTERY BYPASS GRAFT  2003    x3    ENDOSCOPY N/A 11/2/2021    Procedure: ESOPHAGOGASTRODUODENOSCOPY WITH ANESTHESIA;  Surgeon: Bridger Bell MD;  Location: [facility] ENDOSCOPY;  Service: Gastroenterology;  Laterality: N/A;  pre anemia;gi bleed  post  gi bleed;schatski ring  Dr. ERIC Velasco    ENDOSCOPY N/A 10/10/2023    Procedure: ESOPHAGOGASTRODUODENOSCOPY WITH ANESTHESIA;  Surgeon: Adrien Brewster MD;  Location: [facility] ENDOSCOPY;  Service: Gastroenterology;  Laterality: N/A;  preop; anemia  postop esophagitis ; R/O barretts   PCP Randall Beata    EYE SURGERY Bilateral     catorac    INCISION AND DRAINAGE PERIRECTAL ABSCESS N/A 3/3/2017    Procedure: INCISION AND DRAINAGE OF JEET ANAL ABSCESS;  Surgeon: Lynette Smith MD;  Location: [facility] OR;  Service:     INGUINAL HERNIA REPAIR Bilateral 6/27/2023    Procedure: INGUINAL HERNIA BILATERAL REPAIR LAPAROSCOPIC WITH DAVINCI ROBOT WITH MESH;  Surgeon: Tahira Rivera MD;  Location: [facility] OR;  Service: Robotics - DaVinci;  Laterality: Bilateral;    MYRINGOTOMY W/ TUBES Left 04/17/2017    06/10/2016    TONSILLECTOMY      TOTAL HIP ARTHROPLASTY Right 2006     HEALTH MAINTENANCE ITEMS:  Health Maintenance Due   Topic Date Due    TDAP/TD VACCINES (1 - Tdap) Never done    Hepatitis B (1 of 3 - Risk 3-dose series) Never done    COVID-19 Vaccine (7 - 2023-24 season) 11/14/2023    INFLUENZA VACCINE  08/01/2024       <no information>  Last Completed Colonoscopy            COLORECTAL CANCER SCREENING (COLONOSCOPY - Every 3 Years) Next due on  12/8/2026 12/08/2023  COLONOSCOPY    12/08/2023  Surgical Procedure: COLONOSCOPY    10/28/2021  POC Occult Blood Stool    10/13/2020  COLONOSCOPY    10/13/2020  Surgical Procedure: COLONOSCOPY    Only the first 5 history entries have been loaded, but more history exists.                  Immunization History   Administered Date(s) Administered    ABRYSVO (RSV, 60+ or pregnant women 32-36 wks) 09/13/2023    COVID-19 (MODERNA) 1st,2nd,3rd Dose Monovalent 02/24/2021, 03/24/2021, 08/30/2021, 03/30/2022    COVID-19 (MODERNA) BIVALENT 12+YRS 09/08/2022    COVID-19 F23 (MODERNA) 12YRS+ (SPIKEVAX) 09/19/2023    Flu Vaccine Split Quad 09/30/2019    Fluad Quad 65+ 09/19/2023    Fluzone (or Fluarix & Flulaval for VFC) >6mos 09/22/2020    Fluzone High-Dose 65+yrs 10/12/2021, 09/08/2022    Fluzone Quad >6mos (Multi-dose) 09/25/2018, 09/30/2019, 09/22/2020    Influenza TIV (IM) 10/18/2017    Influenza Whole 09/28/2015    Pneumococcal Conjugate 13-Valent (PCV13) 12/28/2014    Pneumococcal Conjugate 20-Valent (PCV20) 02/16/2024    Pneumococcal Polysaccharide (PPSV23) 03/17/2006    Shingrix 12/04/2021, 02/02/2022    Zostavax 12/28/2014     Last Completed Mammogram       This patient has no relevant Health Maintenance data.              FAMILY HISTORY:  Family History   Problem Relation Age of Onset    Breast cancer Mother     Dementia Father     Glaucoma Father     No Known Problems Daughter     Colon polyps Neg Hx     Colon cancer Neg Hx      SOCIAL HISTORY:  Social History     Socioeconomic History    Marital status:    Tobacco Use    Smoking status: Former     Current packs/day: 0.00     Average packs/day: 0.5 packs/day for 25.8 years (12.9 ttl pk-yrs)     Types: Cigarettes     Start date: 1988     Quit date: 10/13/2013     Years since quitting: 10.8     Passive exposure: Past    Smokeless tobacco: Never    Tobacco comments:     quit 2013   Vaping Use    Vaping status: Never Used   Substance and Sexual Activity     "Alcohol use: Not Currently    Drug use: No    Sexual activity: Not Currently     Partners: Female       REVIEW OF SYSTEMS:  Review of Systems   Constitutional:  Negative for activity change, appetite change, fatigue, fever, unexpected weight gain and unexpected weight loss.   HENT:  Negative for dental problem, facial swelling, swollen glands and trouble swallowing.    Eyes:  Negative for double vision and discharge.   Respiratory:  Negative for cough, shortness of breath and wheezing.    Cardiovascular:  Negative for chest pain, palpitations and leg swelling.   Gastrointestinal:  Negative for abdominal pain, blood in stool, nausea and vomiting.   Endocrine: Negative.    Genitourinary:  Negative for dysuria and hematuria.   Musculoskeletal:  Negative for arthralgias and myalgias.   Skin:  Negative for rash, skin lesions and wound.   Allergic/Immunologic: Negative for immunocompromised state.   Neurological:  Negative for speech difficulty, light-headedness, headache, memory problem and confusion.   Hematological:  Negative for adenopathy.   Psychiatric/Behavioral:  Negative for self-injury, suicidal ideas and depressed mood. The patient is not nervous/anxious.        /60   Pulse 50   Temp 97.5 °F (36.4 °C) (Temporal)   Ht 182.9 cm (72\")   Wt 74.6 kg (164 lb 6.4 oz)   SpO2 97%   BMI 22.30 kg/m²  Body surface area is 1.96 meters squared.    Pain Score    08/07/24 0858   PainSc: 0-No pain         Physical Exam  Constitutional:       Appearance: Normal appearance.   HENT:      Head: Normocephalic and atraumatic.   Cardiovascular:      Rate and Rhythm: Normal rate and regular rhythm.   Pulmonary:      Effort: Pulmonary effort is normal.      Breath sounds: Normal breath sounds.   Abdominal:      General: Bowel sounds are normal.      Palpations: Abdomen is soft.   Musculoskeletal:      Right lower leg: No edema.      Left lower leg: No edema.   Skin:     General: Skin is warm and dry.   Neurological:      " Mental Status: He is alert and oriented to person, place, and time.   Psychiatric:         Attention and Perception: Attention normal.         Mood and Affect: Mood normal.         Judgment: Judgment normal.          I have reviewed the PHYSICAL EXAM and the accuracy of it. No changes since the information was documented.  Becky Camacho, APRN 08/07/2024        Ricardo Hugo reports a pain score of 0.  No intervention indicated         ASSESSMENT / PLAN    1. CLL (chronic lymphocytic leukemia)    2. Anemia due to stage 4 chronic kidney disease    3. Iron deficiency anemia, unspecified iron deficiency anemia type    4. Thrombocytopenia    5. Hepatic cirrhosis, unspecified hepatic cirrhosis type, unspecified whether ascites present      ASSESSMENT:         1.  CLL  - Clinical stage 0 from 7/20/2021:  Bone marrow biopsy done 7/12/2021:  with a flow cytometry showing a 2% monoclonal B-cell kappa population consistent with CLL/SLL.    CLL panel:  Negative for a t (11;14)/CCND1-IGH rearrangement  Negative for deletion of MARIA on the long arm of chromosome 11 q. 22  Negative for trisomy 12  Negative for deletion of DLEU1, DLEU2 on the long arm of chromosome 13 q. 14 and monosomy 13  Negative for deletion T p53 in the short arm of chromosome 17 P 13  Cytogenetics showed normal male karyotype  Bone marrow biopsy and aspirate show involvement by chronic lymphocytic leukemia/small lymphocytic lymphoma and up to 5% of cellularity, mild hypercellular marrow with maturing trilineage hematopoiesis, erythroid hyperplasia and a mild atypical small lymphocytic infiltrate with a dominant nodular pattern  Storage iron is present  -Labs:  WBC 4.5, Normal differential   -No s/s of progression  -Stable for observation     2.  Anemia in Chronic Kidney Disease Stage IV  3.  Iron Deficiency Anemia   -Had Injectafer December 1, 2022  -Currently receiving Retacrit for anemia  -Will Continue Retacrit today and biweekly for Hgb < 11 Or HCT <  33   -Sees Dr. Francis, Nephrology ([facility])      4. Cirrhosis of Liver  5.  Thrombocytopenia  -History of chronic alcoholism  - Cirrhotic morphology/appearance of the liver on 07/12/21 ultrasound   -Likely exacerbating anemia and thrombocytopenia  -No s/s of bleeding r/t thrombocytopenia   -Stable for observation     PLAN:  Continue Retacrit 40K units  today and biweekly for Hgb < 11 or Hct < 33  -Continue current medications, treatment plans and follow up with PCP and any other specialist  Return to office in 3 months with CBC, CMP, Iron Profile and Ferritin   Care discussed with patient.  Understanding expressed.  Patient agreeable with plan.      Becky Camacho, STERLING  08/07/2024

## 2024-08-16 NOTE — TELEPHONE ENCOUNTER
[facility] will not fill the tablets (which they have a script for which was written in May 1) because the patches were sent in to try in (May 31).  Patient did not have good luck with or like the patches, he would prefer the tablets.    [facility] will not allow the tablets to be refilled because the patches were the more recent prescription.  Patient told them the story and they said the Doctor had to put the patches on [facility] and allow the fill of the tablets.      Please let patient know.

## 2024-08-19 RX ORDER — CLONIDINE HYDROCHLORIDE 0.3 MG/1
0.3 TABLET ORAL 3 TIMES DAILY
Qty: 42 TABLET | Refills: 0 | Status: SHIPPED | OUTPATIENT
Start: 2024-08-19

## 2024-08-19 RX ORDER — CLONIDINE HYDROCHLORIDE 0.3 MG/1
0.3 TABLET ORAL 3 TIMES DAILY
Qty: 270 TABLET | Refills: 2 | Status: SHIPPED | OUTPATIENT
Start: 2024-08-19

## 2024-08-19 NOTE — TELEPHONE ENCOUNTER
Sent message to provider asking if ok to call [facility] to fill the tablets, as it looks as though nephrology is the one who prescribed the patches.

## 2024-08-19 NOTE — TELEPHONE ENCOUNTER
Called [facility] and they have deactivated the patch Rx from their account.  Pt states he wants them to fill the tablets, but also needs local Rx sent to [facility], until he gets his shipment.  He says he is out of the tablets and has a patch on now, but BP is running high.

## 2024-08-21 ENCOUNTER — TRANSCRIBE ORDERS (OUTPATIENT)
Dept: LAB | Facility: HOSPITAL | Age: 74
End: 2024-08-21
Payer: MEDICARE

## 2024-08-21 ENCOUNTER — LAB (OUTPATIENT)
Dept: LAB | Facility: HOSPITAL | Age: 74
End: 2024-08-21
Payer: MEDICARE

## 2024-08-21 ENCOUNTER — INFUSION (OUTPATIENT)
Dept: ONCOLOGY | Facility: HOSPITAL | Age: 74
End: 2024-08-21
Payer: MEDICARE

## 2024-08-21 VITALS
HEART RATE: 55 BPM | RESPIRATION RATE: 16 BRPM | BODY MASS INDEX: 20.4 KG/M2 | TEMPERATURE: 97.6 F | SYSTOLIC BLOOD PRESSURE: 140 MMHG | OXYGEN SATURATION: 94 % | WEIGHT: 150.6 LBS | DIASTOLIC BLOOD PRESSURE: 43 MMHG | HEIGHT: 72 IN

## 2024-08-21 DIAGNOSIS — I10 ESSENTIAL HYPERTENSION, MALIGNANT: ICD-10-CM

## 2024-08-21 DIAGNOSIS — N18.4 ANEMIA DUE TO STAGE 4 CHRONIC KIDNEY DISEASE: Primary | ICD-10-CM

## 2024-08-21 DIAGNOSIS — N18.4 ANEMIA DUE TO STAGE 4 CHRONIC KIDNEY DISEASE: ICD-10-CM

## 2024-08-21 DIAGNOSIS — E55.9 AVITAMINOSIS D: ICD-10-CM

## 2024-08-21 DIAGNOSIS — D63.1 ANEMIA DUE TO STAGE 4 CHRONIC KIDNEY DISEASE: ICD-10-CM

## 2024-08-21 DIAGNOSIS — D63.1 ANEMIA DUE TO STAGE 4 CHRONIC KIDNEY DISEASE: Primary | ICD-10-CM

## 2024-08-21 DIAGNOSIS — N18.4 CHRONIC KIDNEY DISEASE, STAGE IV (SEVERE): ICD-10-CM

## 2024-08-21 DIAGNOSIS — N18.4 CHRONIC KIDNEY DISEASE, STAGE IV (SEVERE): Primary | ICD-10-CM

## 2024-08-21 LAB
25(OH)D3 SERPL-MCNC: 62.2 NG/ML (ref 30–100)
ALBUMIN SERPL-MCNC: 3.9 G/DL (ref 3.5–5.2)
ALBUMIN/GLOB SERPL: 1.4 G/DL
ALP SERPL-CCNC: 172 U/L (ref 39–117)
ALT SERPL W P-5'-P-CCNC: 8 U/L (ref 1–41)
ANION GAP SERPL CALCULATED.3IONS-SCNC: 15 MMOL/L (ref 5–15)
AST SERPL-CCNC: 13 U/L (ref 1–40)
BACTERIA UR QL AUTO: NORMAL /HPF
BASOPHILS # BLD AUTO: 0.04 10*3/MM3 (ref 0–0.2)
BASOPHILS NFR BLD AUTO: 0.8 % (ref 0–1.5)
BILIRUB SERPL-MCNC: 0.4 MG/DL (ref 0–1.2)
BILIRUB UR QL STRIP: NEGATIVE
BUN SERPL-MCNC: 62 MG/DL (ref 8–23)
BUN/CREAT SERPL: 17.6 (ref 7–25)
CALCIUM SPEC-SCNC: 8 MG/DL (ref 8.6–10.5)
CHLORIDE SERPL-SCNC: 102 MMOL/L (ref 98–107)
CLARITY UR: CLEAR
CO2 SERPL-SCNC: 20 MMOL/L (ref 22–29)
COLOR UR: YELLOW
CREAT SERPL-MCNC: 3.52 MG/DL (ref 0.76–1.27)
CREAT UR-MCNC: 70.2 MG/DL
DEPRECATED RDW RBC AUTO: 57.6 FL (ref 37–54)
EGFRCR SERPLBLD CKD-EPI 2021: 17.2 ML/MIN/1.73
EOSINOPHIL # BLD AUTO: 0.24 10*3/MM3 (ref 0–0.4)
EOSINOPHIL NFR BLD AUTO: 4.7 % (ref 0.3–6.2)
ERYTHROCYTE [DISTWIDTH] IN BLOOD BY AUTOMATED COUNT: 15.4 % (ref 12.3–15.4)
GLOBULIN UR ELPH-MCNC: 2.7 GM/DL
GLUCOSE SERPL-MCNC: 107 MG/DL (ref 65–99)
GLUCOSE UR STRIP-MCNC: NEGATIVE MG/DL
HCT VFR BLD AUTO: 29.9 % (ref 37.5–51)
HGB BLD-MCNC: 9.4 G/DL (ref 13–17.7)
HGB UR QL STRIP.AUTO: NEGATIVE
HYALINE CASTS UR QL AUTO: NORMAL /LPF
IMM GRANULOCYTES # BLD AUTO: 0.02 10*3/MM3 (ref 0–0.05)
IMM GRANULOCYTES NFR BLD AUTO: 0.4 % (ref 0–0.5)
KETONES UR QL STRIP: NEGATIVE
LEUKOCYTE ESTERASE UR QL STRIP.AUTO: NEGATIVE
LYMPHOCYTES # BLD AUTO: 1.04 10*3/MM3 (ref 0.7–3.1)
LYMPHOCYTES NFR BLD AUTO: 20.4 % (ref 19.6–45.3)
MAGNESIUM SERPL-MCNC: 1.5 MG/DL (ref 1.6–2.4)
MCH RBC QN AUTO: 31.9 PG (ref 26.6–33)
MCHC RBC AUTO-ENTMCNC: 31.4 G/DL (ref 31.5–35.7)
MCV RBC AUTO: 101.4 FL (ref 79–97)
MONOCYTES # BLD AUTO: 0.58 10*3/MM3 (ref 0.1–0.9)
MONOCYTES NFR BLD AUTO: 11.4 % (ref 5–12)
NEUTROPHILS NFR BLD AUTO: 3.17 10*3/MM3 (ref 1.7–7)
NEUTROPHILS NFR BLD AUTO: 62.3 % (ref 42.7–76)
NITRITE UR QL STRIP: NEGATIVE
NRBC BLD AUTO-RTO: 0 /100 WBC (ref 0–0.2)
PH UR STRIP.AUTO: 6 [PH] (ref 5–8)
PHOSPHATE SERPL-MCNC: 5.1 MG/DL (ref 2.5–4.5)
PLATELET # BLD AUTO: 102 10*3/MM3 (ref 140–450)
PMV BLD AUTO: 9.9 FL (ref 6–12)
POTASSIUM SERPL-SCNC: 4.5 MMOL/L (ref 3.5–5.2)
PROT ?TM UR-MCNC: 253.6 MG/DL
PROT SERPL-MCNC: 6.6 G/DL (ref 6–8.5)
PROT UR QL STRIP: ABNORMAL
PROT/CREAT UR: 3612.5 MG/G CREA (ref 0–200)
PTH-INTACT SERPL-MCNC: 304.1 PG/ML (ref 15–65)
RBC # BLD AUTO: 2.95 10*6/MM3 (ref 4.14–5.8)
RBC # UR STRIP: NORMAL /HPF
REF LAB TEST METHOD: NORMAL
SODIUM SERPL-SCNC: 137 MMOL/L (ref 136–145)
SP GR UR STRIP: 1.01 (ref 1–1.03)
SQUAMOUS #/AREA URNS HPF: NORMAL /HPF
URATE SERPL-MCNC: 7.7 MG/DL (ref 3.4–7)
UROBILINOGEN UR QL STRIP: ABNORMAL
WBC # UR STRIP: NORMAL /HPF
WBC NRBC COR # BLD AUTO: 5.09 10*3/MM3 (ref 3.4–10.8)

## 2024-08-21 PROCEDURE — 25010000002 EPOETIN ALFA-EPBX 40000 UNIT/ML SOLUTION: Performed by: NURSE PRACTITIONER

## 2024-08-21 PROCEDURE — 80053 COMPREHEN METABOLIC PANEL: CPT

## 2024-08-21 PROCEDURE — 85025 COMPLETE CBC W/AUTO DIFF WBC: CPT

## 2024-08-21 PROCEDURE — 83735 ASSAY OF MAGNESIUM: CPT

## 2024-08-21 PROCEDURE — 84156 ASSAY OF PROTEIN URINE: CPT

## 2024-08-21 PROCEDURE — 96372 THER/PROPH/DIAG INJ SC/IM: CPT

## 2024-08-21 PROCEDURE — 81001 URINALYSIS AUTO W/SCOPE: CPT

## 2024-08-21 PROCEDURE — 84100 ASSAY OF PHOSPHORUS: CPT

## 2024-08-21 PROCEDURE — 84550 ASSAY OF BLOOD/URIC ACID: CPT

## 2024-08-21 PROCEDURE — 83970 ASSAY OF PARATHORMONE: CPT

## 2024-08-21 PROCEDURE — 82570 ASSAY OF URINE CREATININE: CPT

## 2024-08-21 PROCEDURE — 82306 VITAMIN D 25 HYDROXY: CPT

## 2024-08-21 PROCEDURE — 36415 COLL VENOUS BLD VENIPUNCTURE: CPT

## 2024-08-21 RX ADMIN — EPOETIN ALFA-EPBX 40000 UNITS: 40000 INJECTION, SOLUTION INTRAVENOUS; SUBCUTANEOUS at 09:51

## 2024-08-27 ENCOUNTER — OFFICE VISIT (OUTPATIENT)
Dept: INTERNAL MEDICINE | Facility: CLINIC | Age: 74
End: 2024-08-27
Payer: MEDICARE

## 2024-08-27 VITALS
DIASTOLIC BLOOD PRESSURE: 60 MMHG | OXYGEN SATURATION: 98 % | HEART RATE: 54 BPM | HEIGHT: 72 IN | TEMPERATURE: 98.1 F | BODY MASS INDEX: 20.59 KG/M2 | SYSTOLIC BLOOD PRESSURE: 142 MMHG | WEIGHT: 152 LBS

## 2024-08-27 DIAGNOSIS — R80.1 PERSISTENT PROTEINURIA: ICD-10-CM

## 2024-08-27 DIAGNOSIS — N18.4 ANEMIA DUE TO STAGE 4 CHRONIC KIDNEY DISEASE: ICD-10-CM

## 2024-08-27 DIAGNOSIS — N18.4 CRD (CHRONIC RENAL DISEASE), STAGE IV: ICD-10-CM

## 2024-08-27 DIAGNOSIS — L30.8 DERMATITIS ASSOCIATED WITH MOISTURE: Primary | ICD-10-CM

## 2024-08-27 DIAGNOSIS — I1A.0 RESISTANT HYPERTENSION: ICD-10-CM

## 2024-08-27 DIAGNOSIS — R01.1 SYSTOLIC MURMUR: Chronic | ICD-10-CM

## 2024-08-27 DIAGNOSIS — D63.1 ANEMIA DUE TO STAGE 4 CHRONIC KIDNEY DISEASE: ICD-10-CM

## 2024-08-27 PROBLEM — R80.9 PROTEINURIA: Status: ACTIVE | Noted: 2024-08-27

## 2024-08-27 PROCEDURE — 3077F SYST BP >= 140 MM HG: CPT | Performed by: INTERNAL MEDICINE

## 2024-08-27 PROCEDURE — 99214 OFFICE O/P EST MOD 30 MIN: CPT | Performed by: INTERNAL MEDICINE

## 2024-08-27 PROCEDURE — 1159F MED LIST DOCD IN RCRD: CPT | Performed by: INTERNAL MEDICINE

## 2024-08-27 PROCEDURE — 3078F DIAST BP <80 MM HG: CPT | Performed by: INTERNAL MEDICINE

## 2024-08-27 PROCEDURE — 1126F AMNT PAIN NOTED NONE PRSNT: CPT | Performed by: INTERNAL MEDICINE

## 2024-08-27 PROCEDURE — G2211 COMPLEX E/M VISIT ADD ON: HCPCS | Performed by: INTERNAL MEDICINE

## 2024-08-27 PROCEDURE — 1160F RVW MEDS BY RX/DR IN RCRD: CPT | Performed by: INTERNAL MEDICINE

## 2024-08-27 RX ORDER — FUROSEMIDE 20 MG
20 TABLET ORAL 2 TIMES DAILY
Qty: 180 TABLET | Refills: 3 | Status: SHIPPED | OUTPATIENT
Start: 2024-08-27

## 2024-08-27 RX ORDER — SPIRONOLACTONE 25 MG/1
12.5 TABLET ORAL DAILY
Qty: 45 TABLET | Refills: 0 | Status: SHIPPED | OUTPATIENT
Start: 2024-08-27

## 2024-08-27 RX ORDER — FUROSEMIDE 20 MG
20 TABLET ORAL 2 TIMES DAILY
Qty: 60 TABLET | Refills: 0 | Status: SHIPPED | OUTPATIENT
Start: 2024-08-27

## 2024-08-27 NOTE — PROGRESS NOTES
"      Chief Complaint  Hypertension (3 month follow up ) and issue with tail bone     Subjective        Ricardo Hugo presents to [facility]    HPI    Patient here for the above problems.  See Assessment and Plan for further HPI components.      Review of Systems    Objective   Vital Signs:  /60 (BP Location: Left arm, Patient Position: Sitting, Cuff Size: Adult)   Pulse 54   Temp 98.1 °F (36.7 °C) (Temporal)   Ht 182.9 cm (72\")   Wt 68.9 kg (152 lb)   SpO2 98%   BMI 20.61 kg/m²   Estimated body mass index is 20.61 kg/m² as calculated from the following:    Height as of this encounter: 182.9 cm (72\").    Weight as of this encounter: 68.9 kg (152 lb).      Physical Exam  Vitals and nursing note reviewed.   Constitutional:       Appearance: He is not ill-appearing.   Eyes:      General: No scleral icterus.     Conjunctiva/sclera: Conjunctivae normal.   Pulmonary:      Effort: Pulmonary effort is normal. No respiratory distress.   Skin:            Comments: Moisture changes   Neurological:      General: No focal deficit present.      Mental Status: He is alert and oriented to person, place, and time.   Psychiatric:         Mood and Affect: Mood normal.         Behavior: Behavior normal.                     Assessment and Plan   Diagnoses and all orders for this visit:    1. Dermatitis associated with moisture (Primary)    2. CRD (chronic renal disease), stage IV  -     spironolactone (ALDACTONE) 25 MG tablet; Take 0.5 tablets by mouth Daily.  Dispense: 45 tablet; Refill: 0  -     Basic metabolic panel; Future    3. Resistant hypertension  -     spironolactone (ALDACTONE) 25 MG tablet; Take 0.5 tablets by mouth Daily.  Dispense: 45 tablet; Refill: 0  -     Basic metabolic panel; Future    4. Systolic murmur    5. Anemia due to stage 4 chronic kidney disease    6. Persistent proteinuria    Other orders  -     furosemide (Lasix) 20 MG tablet; Take 1 tablet by mouth 2 (Two) " Times a Day.  Dispense: 180 tablet; Refill: 3  -     furosemide (Lasix) 20 MG tablet; Take 1 tablet by mouth 2 (Two) Times a Day.  Dispense: 60 tablet; Refill: 0      Patient has resistant hypertension, chronic kidney disease stage IV, and severe proteinuria.  Patient's GFR is not good enough to do a SGLT2 inhibitor.  Patient is optimized on an angiotensin receptor blocker.  I reached out to STERLING Freeman, who is the patient's nephrology provider.  Discussed spironolactone.  They indicated that we could give this a try.  He is going to follow-up with them on Friday.  I am going to see the patient back in 1 month and check a BMP at that point in time.  Patient has had stable kidney function, but trying to prevent worsening.  Patient recently had labs for nephrology, patient has CKD 4, and greater than 3 g of protein based on protein creatinine ratio.    Patient previously had a cyst removed in his JEET sacral area.  The patient has some moisture breakdown and dermatitis.  Recommended Desitin or Desitin and A&D.  Keep area dry.      Patient has a systolic murmur.  Last echo was in 2022.  Patient at that timeDid not have anything greater than moderate valvular disease.  Continue to monitor.  Consider repeat of an echo next year.    Result Review :  The following data was reviewed by: Nathan Zimmer MD on 08/27/2024:   Latest Reference Range & Units 08/21/24 09:04 08/21/24 09:06   Sodium 136 - 145 mmol/L 137    Potassium 3.5 - 5.2 mmol/L 4.5    Chloride 98 - 107 mmol/L 102    CO2 22.0 - 29.0 mmol/L 20.0 (L)    Anion Gap 5.0 - 15.0 mmol/L 15.0    BUN 8 - 23 mg/dL 62 (H)    Creatinine 0.76 - 1.27 mg/dL 3.52 (H)    BUN/Creatinine Ratio 7.0 - 25.0  17.6    eGFR >60.0 mL/min/1.73 17.2 (L)    Glucose 65 - 99 mg/dL 107 (H)    Calcium 8.6 - 10.5 mg/dL 8.0 (L)    Magnesium 1.6 - 2.4 mg/dL 1.5 (L)    Phosphorus 2.5 - 4.5 mg/dL 5.1 (H)    Alkaline Phosphatase 39 - 117 U/L 172 (H)    Total Protein 6.0 - 8.5 g/dL 6.6     Albumin 3.5 - 5.2 g/dL 3.9    Globulin gm/dL 2.7    A/G Ratio g/dL 1.4    AST (SGOT) 1 - 40 U/L 13    ALT (SGPT) 1 - 41 U/L 8    Total Bilirubin 0.0 - 1.2 mg/dL 0.4    Uric Acid 3.4 - 7.0 mg/dL 7.7 (H)    PTH Intact (Serial Monitor) 15.0 - 65.0 pg/mL 304.1 (H)    25 Hydroxy, Vitamin D 30.0 - 100.0 ng/ml 62.2    WBC 3.40 - 10.80 10*3/mm3 5.09    RBC 4.14 - 5.80 10*6/mm3 2.95 (L)    Hemoglobin 13.0 - 17.7 g/dL 9.4 (L)    Hematocrit 37.5 - 51.0 % 29.9 (L)    Platelets 140 - 450 10*3/mm3 102 (L)    RDW 12.3 - 15.4 % 15.4    MCV 79.0 - 97.0 fL 101.4 (H)    MCH 26.6 - 33.0 pg 31.9    MCHC 31.5 - 35.7 g/dL 31.4 (L)    MPV 6.0 - 12.0 fL 9.9    RDW-SD 37.0 - 54.0 fl 57.6 (H)    Neutrophil Rel % 42.7 - 76.0 % 62.3    Lymphocyte Rel % 19.6 - 45.3 % 20.4    Monocyte Rel % 5.0 - 12.0 % 11.4    Eosinophil Rel % 0.3 - 6.2 % 4.7    Basophil Rel % 0.0 - 1.5 % 0.8    Immature Granulocyte Rel % 0.0 - 0.5 % 0.4    Neutrophils Absolute 1.70 - 7.00 10*3/mm3 3.17    Lymphocytes Absolute 0.70 - 3.10 10*3/mm3 1.04    Monocytes Absolute 0.10 - 0.90 10*3/mm3 0.58    Eosinophils Absolute 0.00 - 0.40 10*3/mm3 0.24    Basophils Absolute 0.00 - 0.20 10*3/mm3 0.04    Immature Grans, Absolute 0.00 - 0.05 10*3/mm3 0.02    nRBC 0.0 - 0.2 /100 WBC 0.0    Color, UA Yellow, Straw   Yellow   Appearance, UA Clear   Clear   Specific Gravity, UA 1.005 - 1.030   1.014   pH, UA 5.0 - 8.0   6.0   Glucose Negative   Negative   Ketones, UA Negative   Negative   Blood, UA Negative   Negative   Nitrite, UA Negative   Negative   Leukocytes, UA Negative   Negative   Protein, UA Negative   >=300 mg/dL (3+) !   Bilirubin, UA Negative   Negative   Urobilinogen, UA 0.2 - 1.0 E.U./dL   0.2 E.U./dL   RBC, UA None Seen, 0-2 /HPF  0-2   WBC, UA None Seen, 0-2 /HPF  0-2   Bacteria, UA None Seen /HPF  None Seen   Squamous Epithelial Cells, UA None Seen, 0-2 /HPF  0-2   Hyaline Casts, UA None Seen /LPF  0-2   Methodology:   Automated Microscopy   Creatinine, Urine mg/dL   70.2   Total Protein, Urine mg/dL  253.6   Protein/Creatinine Ratio 0.0 - 200.0 mg/G Crea  3,612.5 (H)   (L): Data is abnormally low  (H): Data is abnormally high  !: Data is abnormal         BMI is within normal parameters. No other follow-up for BMI required.      BMI is within normal parameters. No other follow-up for BMI required.            Follow Up   Return in about 3 months (around 11/27/2024), or if symptoms worsen or fail to improve, for follow up for above problems. Longitudinal care..  Patient was given instructions and counseling regarding his condition or for health maintenance advice. Please see specific information pulled into the AVS if appropriate.       MAHENDRA Zimmer MD, FACP, FH      Electronically signed by Nathan Zimmer MD, 08/27/24, 10:22 AM CDT.

## 2024-09-04 ENCOUNTER — INFUSION (OUTPATIENT)
Dept: ONCOLOGY | Facility: HOSPITAL | Age: 74
End: 2024-09-04
Payer: MEDICARE

## 2024-09-04 ENCOUNTER — LAB (OUTPATIENT)
Dept: LAB | Facility: HOSPITAL | Age: 74
End: 2024-09-04
Payer: MEDICARE

## 2024-09-04 VITALS
WEIGHT: 152.4 LBS | HEIGHT: 72 IN | OXYGEN SATURATION: 100 % | DIASTOLIC BLOOD PRESSURE: 63 MMHG | BODY MASS INDEX: 20.64 KG/M2 | RESPIRATION RATE: 18 BRPM | SYSTOLIC BLOOD PRESSURE: 111 MMHG | TEMPERATURE: 97.1 F | HEART RATE: 50 BPM

## 2024-09-04 DIAGNOSIS — N18.4 ANEMIA DUE TO STAGE 4 CHRONIC KIDNEY DISEASE: Primary | ICD-10-CM

## 2024-09-04 DIAGNOSIS — D63.1 ANEMIA DUE TO STAGE 4 CHRONIC KIDNEY DISEASE: Primary | ICD-10-CM

## 2024-09-04 DIAGNOSIS — I73.9 PAD (PERIPHERAL ARTERY DISEASE): ICD-10-CM

## 2024-09-04 DIAGNOSIS — N18.4 ANEMIA DUE TO STAGE 4 CHRONIC KIDNEY DISEASE: ICD-10-CM

## 2024-09-04 DIAGNOSIS — D63.1 ANEMIA DUE TO STAGE 4 CHRONIC KIDNEY DISEASE: ICD-10-CM

## 2024-09-04 LAB
ALBUMIN SERPL-MCNC: 3.8 G/DL (ref 3.5–5.2)
ALBUMIN/GLOB SERPL: 1.4 G/DL
ALP SERPL-CCNC: 171 U/L (ref 39–117)
ALT SERPL W P-5'-P-CCNC: 6 U/L (ref 1–41)
ANION GAP SERPL CALCULATED.3IONS-SCNC: 13 MMOL/L (ref 5–15)
AST SERPL-CCNC: 9 U/L (ref 1–40)
BASOPHILS # BLD AUTO: 0.03 10*3/MM3 (ref 0–0.2)
BASOPHILS NFR BLD AUTO: 0.5 % (ref 0–1.5)
BILIRUB SERPL-MCNC: 0.5 MG/DL (ref 0–1.2)
BUN SERPL-MCNC: 65 MG/DL (ref 8–23)
BUN/CREAT SERPL: 16.4 (ref 7–25)
CALCIUM SPEC-SCNC: 8.3 MG/DL (ref 8.6–10.5)
CHLORIDE SERPL-SCNC: 103 MMOL/L (ref 98–107)
CO2 SERPL-SCNC: 19 MMOL/L (ref 22–29)
CREAT SERPL-MCNC: 3.96 MG/DL (ref 0.76–1.27)
DEPRECATED RDW RBC AUTO: 59.7 FL (ref 37–54)
EGFRCR SERPLBLD CKD-EPI 2021: 15 ML/MIN/1.73
EOSINOPHIL # BLD AUTO: 0.21 10*3/MM3 (ref 0–0.4)
EOSINOPHIL NFR BLD AUTO: 3.2 % (ref 0.3–6.2)
ERYTHROCYTE [DISTWIDTH] IN BLOOD BY AUTOMATED COUNT: 15.9 % (ref 12.3–15.4)
GLOBULIN UR ELPH-MCNC: 2.7 GM/DL
GLUCOSE SERPL-MCNC: 129 MG/DL (ref 65–99)
HCT VFR BLD AUTO: 29.4 % (ref 37.5–51)
HGB BLD-MCNC: 9.1 G/DL (ref 13–17.7)
IMM GRANULOCYTES # BLD AUTO: 0.03 10*3/MM3 (ref 0–0.05)
IMM GRANULOCYTES NFR BLD AUTO: 0.5 % (ref 0–0.5)
LYMPHOCYTES # BLD AUTO: 0.91 10*3/MM3 (ref 0.7–3.1)
LYMPHOCYTES NFR BLD AUTO: 13.7 % (ref 19.6–45.3)
MCH RBC QN AUTO: 31.6 PG (ref 26.6–33)
MCHC RBC AUTO-ENTMCNC: 31 G/DL (ref 31.5–35.7)
MCV RBC AUTO: 102.1 FL (ref 79–97)
MONOCYTES # BLD AUTO: 0.82 10*3/MM3 (ref 0.1–0.9)
MONOCYTES NFR BLD AUTO: 12.3 % (ref 5–12)
NEUTROPHILS NFR BLD AUTO: 4.66 10*3/MM3 (ref 1.7–7)
NEUTROPHILS NFR BLD AUTO: 69.8 % (ref 42.7–76)
NRBC BLD AUTO-RTO: 0 /100 WBC (ref 0–0.2)
PLATELET # BLD AUTO: 102 10*3/MM3 (ref 140–450)
PMV BLD AUTO: 10.2 FL (ref 6–12)
POTASSIUM SERPL-SCNC: 4 MMOL/L (ref 3.5–5.2)
PROT SERPL-MCNC: 6.5 G/DL (ref 6–8.5)
RBC # BLD AUTO: 2.88 10*6/MM3 (ref 4.14–5.8)
SODIUM SERPL-SCNC: 135 MMOL/L (ref 136–145)
WBC NRBC COR # BLD AUTO: 6.66 10*3/MM3 (ref 3.4–10.8)

## 2024-09-04 PROCEDURE — 96372 THER/PROPH/DIAG INJ SC/IM: CPT

## 2024-09-04 PROCEDURE — 85025 COMPLETE CBC W/AUTO DIFF WBC: CPT

## 2024-09-04 PROCEDURE — 80053 COMPREHEN METABOLIC PANEL: CPT

## 2024-09-04 PROCEDURE — 36415 COLL VENOUS BLD VENIPUNCTURE: CPT

## 2024-09-04 PROCEDURE — 25010000002 EPOETIN ALFA-EPBX 40000 UNIT/ML SOLUTION: Performed by: NURSE PRACTITIONER

## 2024-09-04 RX ORDER — ATORVASTATIN CALCIUM 10 MG/1
10 TABLET, FILM COATED ORAL DAILY
Qty: 90 TABLET | Refills: 3 | Status: SHIPPED | OUTPATIENT
Start: 2024-09-04

## 2024-09-04 RX ADMIN — EPOETIN ALFA-EPBX 40000 UNITS: 40000 INJECTION, SOLUTION INTRAVENOUS; SUBCUTANEOUS at 09:36

## 2024-09-18 ENCOUNTER — LAB (OUTPATIENT)
Dept: LAB | Facility: HOSPITAL | Age: 74
End: 2024-09-18
Payer: MEDICARE

## 2024-09-18 ENCOUNTER — INFUSION (OUTPATIENT)
Dept: ONCOLOGY | Facility: HOSPITAL | Age: 74
End: 2024-09-18
Payer: MEDICARE

## 2024-09-18 VITALS
BODY MASS INDEX: 20.42 KG/M2 | DIASTOLIC BLOOD PRESSURE: 43 MMHG | HEIGHT: 72 IN | OXYGEN SATURATION: 95 % | RESPIRATION RATE: 16 BRPM | WEIGHT: 150.8 LBS | HEART RATE: 49 BPM | TEMPERATURE: 96.9 F | SYSTOLIC BLOOD PRESSURE: 140 MMHG

## 2024-09-18 DIAGNOSIS — N18.4 ANEMIA DUE TO STAGE 4 CHRONIC KIDNEY DISEASE: Primary | ICD-10-CM

## 2024-09-18 DIAGNOSIS — N18.4 ANEMIA DUE TO STAGE 4 CHRONIC KIDNEY DISEASE: ICD-10-CM

## 2024-09-18 DIAGNOSIS — C91.10 CLL (CHRONIC LYMPHOCYTIC LEUKEMIA): ICD-10-CM

## 2024-09-18 DIAGNOSIS — D63.1 ANEMIA DUE TO STAGE 4 CHRONIC KIDNEY DISEASE: ICD-10-CM

## 2024-09-18 DIAGNOSIS — D63.1 ANEMIA DUE TO STAGE 4 CHRONIC KIDNEY DISEASE: Primary | ICD-10-CM

## 2024-09-18 LAB
ALBUMIN SERPL-MCNC: 3.8 G/DL (ref 3.5–5.2)
ALBUMIN/GLOB SERPL: 1.5 G/DL
ALP SERPL-CCNC: 166 U/L (ref 39–117)
ALT SERPL W P-5'-P-CCNC: 6 U/L (ref 1–41)
ANION GAP SERPL CALCULATED.3IONS-SCNC: 15 MMOL/L (ref 5–15)
AST SERPL-CCNC: 14 U/L (ref 1–40)
BASOPHILS # BLD AUTO: 0.04 10*3/MM3 (ref 0–0.2)
BASOPHILS NFR BLD AUTO: 0.7 % (ref 0–1.5)
BILIRUB SERPL-MCNC: 0.5 MG/DL (ref 0–1.2)
BUN SERPL-MCNC: 73 MG/DL (ref 8–23)
BUN/CREAT SERPL: 20.1 (ref 7–25)
CALCIUM SPEC-SCNC: 8.6 MG/DL (ref 8.6–10.5)
CHLORIDE SERPL-SCNC: 103 MMOL/L (ref 98–107)
CO2 SERPL-SCNC: 19 MMOL/L (ref 22–29)
CREAT SERPL-MCNC: 3.64 MG/DL (ref 0.76–1.27)
DEPRECATED RDW RBC AUTO: 60.7 FL (ref 37–54)
EGFRCR SERPLBLD CKD-EPI 2021: 16.5 ML/MIN/1.73
EOSINOPHIL # BLD AUTO: 0.18 10*3/MM3 (ref 0–0.4)
EOSINOPHIL NFR BLD AUTO: 3 % (ref 0.3–6.2)
ERYTHROCYTE [DISTWIDTH] IN BLOOD BY AUTOMATED COUNT: 16 % (ref 12.3–15.4)
FERRITIN SERPL-MCNC: 203.4 NG/ML (ref 30–400)
GLOBULIN UR ELPH-MCNC: 2.6 GM/DL
GLUCOSE SERPL-MCNC: 154 MG/DL (ref 65–99)
HCT VFR BLD AUTO: 30.1 % (ref 37.5–51)
HGB BLD-MCNC: 9.2 G/DL (ref 13–17.7)
IMM GRANULOCYTES # BLD AUTO: 0.02 10*3/MM3 (ref 0–0.05)
IMM GRANULOCYTES NFR BLD AUTO: 0.3 % (ref 0–0.5)
IRON 24H UR-MRATE: 87 MCG/DL (ref 59–158)
IRON SATN MFR SERPL: 31 % (ref 20–50)
LDH SERPL-CCNC: 182 U/L (ref 135–225)
LYMPHOCYTES # BLD AUTO: 0.81 10*3/MM3 (ref 0.7–3.1)
LYMPHOCYTES NFR BLD AUTO: 13.6 % (ref 19.6–45.3)
MCH RBC QN AUTO: 31.4 PG (ref 26.6–33)
MCHC RBC AUTO-ENTMCNC: 30.6 G/DL (ref 31.5–35.7)
MCV RBC AUTO: 102.7 FL (ref 79–97)
MONOCYTES # BLD AUTO: 0.55 10*3/MM3 (ref 0.1–0.9)
MONOCYTES NFR BLD AUTO: 9.2 % (ref 5–12)
NEUTROPHILS NFR BLD AUTO: 4.37 10*3/MM3 (ref 1.7–7)
NEUTROPHILS NFR BLD AUTO: 73.2 % (ref 42.7–76)
NRBC BLD AUTO-RTO: 0 /100 WBC (ref 0–0.2)
PLATELET # BLD AUTO: 104 10*3/MM3 (ref 140–450)
PMV BLD AUTO: 9.8 FL (ref 6–12)
POTASSIUM SERPL-SCNC: 4.4 MMOL/L (ref 3.5–5.2)
PROT SERPL-MCNC: 6.4 G/DL (ref 6–8.5)
RBC # BLD AUTO: 2.93 10*6/MM3 (ref 4.14–5.8)
SODIUM SERPL-SCNC: 137 MMOL/L (ref 136–145)
TIBC SERPL-MCNC: 280 MCG/DL (ref 298–536)
TRANSFERRIN SERPL-MCNC: 188 MG/DL (ref 200–360)
WBC NRBC COR # BLD AUTO: 5.97 10*3/MM3 (ref 3.4–10.8)

## 2024-09-18 PROCEDURE — 83615 LACTATE (LD) (LDH) ENZYME: CPT

## 2024-09-18 PROCEDURE — 85025 COMPLETE CBC W/AUTO DIFF WBC: CPT

## 2024-09-18 PROCEDURE — 36415 COLL VENOUS BLD VENIPUNCTURE: CPT

## 2024-09-18 PROCEDURE — 83540 ASSAY OF IRON: CPT

## 2024-09-18 PROCEDURE — 96372 THER/PROPH/DIAG INJ SC/IM: CPT

## 2024-09-18 PROCEDURE — 84466 ASSAY OF TRANSFERRIN: CPT

## 2024-09-18 PROCEDURE — 80053 COMPREHEN METABOLIC PANEL: CPT

## 2024-09-18 PROCEDURE — 82728 ASSAY OF FERRITIN: CPT

## 2024-09-18 PROCEDURE — 25010000002 EPOETIN ALFA-EPBX 40000 UNIT/ML SOLUTION: Performed by: NURSE PRACTITIONER

## 2024-09-18 RX ADMIN — EPOETIN ALFA-EPBX 40000 UNITS: 40000 INJECTION, SOLUTION INTRAVENOUS; SUBCUTANEOUS at 09:14

## 2024-09-24 ENCOUNTER — OFFICE VISIT (OUTPATIENT)
Dept: INTERNAL MEDICINE | Facility: CLINIC | Age: 74
End: 2024-09-24
Payer: MEDICARE

## 2024-09-24 VITALS
DIASTOLIC BLOOD PRESSURE: 60 MMHG | HEIGHT: 72 IN | SYSTOLIC BLOOD PRESSURE: 146 MMHG | OXYGEN SATURATION: 97 % | BODY MASS INDEX: 20.45 KG/M2 | TEMPERATURE: 98.4 F | HEART RATE: 51 BPM | WEIGHT: 151 LBS

## 2024-09-24 DIAGNOSIS — N18.4 CRD (CHRONIC RENAL DISEASE), STAGE IV: ICD-10-CM

## 2024-09-24 DIAGNOSIS — I51.89 DIASTOLIC DYSFUNCTION: ICD-10-CM

## 2024-09-24 DIAGNOSIS — L30.8 DERMATITIS ASSOCIATED WITH MOISTURE: Primary | ICD-10-CM

## 2024-09-24 DIAGNOSIS — I1A.0 RESISTANT HYPERTENSION: ICD-10-CM

## 2024-09-24 PROCEDURE — 3077F SYST BP >= 140 MM HG: CPT | Performed by: INTERNAL MEDICINE

## 2024-09-24 PROCEDURE — 3078F DIAST BP <80 MM HG: CPT | Performed by: INTERNAL MEDICINE

## 2024-09-24 PROCEDURE — 99214 OFFICE O/P EST MOD 30 MIN: CPT | Performed by: INTERNAL MEDICINE

## 2024-09-24 PROCEDURE — 1126F AMNT PAIN NOTED NONE PRSNT: CPT | Performed by: INTERNAL MEDICINE

## 2024-09-24 PROCEDURE — G2211 COMPLEX E/M VISIT ADD ON: HCPCS | Performed by: INTERNAL MEDICINE

## 2024-09-24 RX ORDER — CIPROFLOXACIN AND DEXAMETHASONE 3; 1 MG/ML; MG/ML
4 SUSPENSION/ DROPS AURICULAR (OTIC) 2 TIMES DAILY
Qty: 7.5 ML | Refills: 1 | Status: SHIPPED | OUTPATIENT
Start: 2024-09-24

## 2024-09-24 RX ORDER — FUROSEMIDE 20 MG
20 TABLET ORAL 3 TIMES DAILY
Qty: 270 TABLET | Refills: 3 | Status: SHIPPED | OUTPATIENT
Start: 2024-09-24

## 2024-09-24 RX ORDER — SPIRONOLACTONE 25 MG/1
25 TABLET ORAL DAILY
Qty: 90 TABLET | Refills: 2 | Status: SHIPPED | OUTPATIENT
Start: 2024-09-24

## 2024-10-02 ENCOUNTER — LAB (OUTPATIENT)
Dept: LAB | Facility: HOSPITAL | Age: 74
End: 2024-10-02
Payer: MEDICARE

## 2024-10-02 ENCOUNTER — INFUSION (OUTPATIENT)
Dept: ONCOLOGY | Facility: HOSPITAL | Age: 74
End: 2024-10-02
Payer: MEDICARE

## 2024-10-02 VITALS
HEIGHT: 72 IN | WEIGHT: 146.8 LBS | RESPIRATION RATE: 18 BRPM | DIASTOLIC BLOOD PRESSURE: 50 MMHG | OXYGEN SATURATION: 95 % | HEART RATE: 53 BPM | TEMPERATURE: 97.4 F | BODY MASS INDEX: 19.88 KG/M2 | SYSTOLIC BLOOD PRESSURE: 169 MMHG

## 2024-10-02 DIAGNOSIS — D63.1 ANEMIA DUE TO STAGE 4 CHRONIC KIDNEY DISEASE: ICD-10-CM

## 2024-10-02 DIAGNOSIS — D63.1 ANEMIA DUE TO STAGE 4 CHRONIC KIDNEY DISEASE: Primary | ICD-10-CM

## 2024-10-02 DIAGNOSIS — N18.4 ANEMIA DUE TO STAGE 4 CHRONIC KIDNEY DISEASE: Primary | ICD-10-CM

## 2024-10-02 DIAGNOSIS — N18.4 ANEMIA DUE TO STAGE 4 CHRONIC KIDNEY DISEASE: ICD-10-CM

## 2024-10-02 LAB
ALBUMIN SERPL-MCNC: 3.8 G/DL (ref 3.5–5.2)
ALBUMIN/GLOB SERPL: 1.5 G/DL
ALP SERPL-CCNC: 197 U/L (ref 39–117)
ALT SERPL W P-5'-P-CCNC: 8 U/L (ref 1–41)
ANION GAP SERPL CALCULATED.3IONS-SCNC: 14 MMOL/L (ref 5–15)
AST SERPL-CCNC: 12 U/L (ref 1–40)
BASOPHILS # BLD AUTO: 0.03 10*3/MM3 (ref 0–0.2)
BASOPHILS NFR BLD AUTO: 0.7 % (ref 0–1.5)
BILIRUB SERPL-MCNC: 0.3 MG/DL (ref 0–1.2)
BUN SERPL-MCNC: 76 MG/DL (ref 8–23)
BUN/CREAT SERPL: 21.1 (ref 7–25)
CALCIUM SPEC-SCNC: 8.6 MG/DL (ref 8.6–10.5)
CHLORIDE SERPL-SCNC: 106 MMOL/L (ref 98–107)
CO2 SERPL-SCNC: 20 MMOL/L (ref 22–29)
CREAT SERPL-MCNC: 3.61 MG/DL (ref 0.76–1.27)
DEPRECATED RDW RBC AUTO: 61.5 FL (ref 37–54)
EGFRCR SERPLBLD CKD-EPI 2021: 16.7 ML/MIN/1.73
EOSINOPHIL # BLD AUTO: 0.17 10*3/MM3 (ref 0–0.4)
EOSINOPHIL NFR BLD AUTO: 4 % (ref 0.3–6.2)
ERYTHROCYTE [DISTWIDTH] IN BLOOD BY AUTOMATED COUNT: 16.2 % (ref 12.3–15.4)
GLOBULIN UR ELPH-MCNC: 2.5 GM/DL
GLUCOSE SERPL-MCNC: 103 MG/DL (ref 65–99)
HCT VFR BLD AUTO: 30.1 % (ref 37.5–51)
HGB BLD-MCNC: 9.4 G/DL (ref 13–17.7)
IMM GRANULOCYTES # BLD AUTO: 0.02 10*3/MM3 (ref 0–0.05)
IMM GRANULOCYTES NFR BLD AUTO: 0.5 % (ref 0–0.5)
LYMPHOCYTES # BLD AUTO: 0.75 10*3/MM3 (ref 0.7–3.1)
LYMPHOCYTES NFR BLD AUTO: 17.6 % (ref 19.6–45.3)
MCH RBC QN AUTO: 32.1 PG (ref 26.6–33)
MCHC RBC AUTO-ENTMCNC: 31.2 G/DL (ref 31.5–35.7)
MCV RBC AUTO: 102.7 FL (ref 79–97)
MONOCYTES # BLD AUTO: 0.44 10*3/MM3 (ref 0.1–0.9)
MONOCYTES NFR BLD AUTO: 10.3 % (ref 5–12)
NEUTROPHILS NFR BLD AUTO: 2.86 10*3/MM3 (ref 1.7–7)
NEUTROPHILS NFR BLD AUTO: 66.9 % (ref 42.7–76)
NRBC BLD AUTO-RTO: 0 /100 WBC (ref 0–0.2)
PLATELET # BLD AUTO: 98 10*3/MM3 (ref 140–450)
PMV BLD AUTO: 10.1 FL (ref 6–12)
POTASSIUM SERPL-SCNC: 4.7 MMOL/L (ref 3.5–5.2)
PROT SERPL-MCNC: 6.3 G/DL (ref 6–8.5)
RBC # BLD AUTO: 2.93 10*6/MM3 (ref 4.14–5.8)
SODIUM SERPL-SCNC: 140 MMOL/L (ref 136–145)
WBC NRBC COR # BLD AUTO: 4.27 10*3/MM3 (ref 3.4–10.8)

## 2024-10-02 PROCEDURE — 96372 THER/PROPH/DIAG INJ SC/IM: CPT

## 2024-10-02 PROCEDURE — 25010000002 EPOETIN ALFA-EPBX 40000 UNIT/ML SOLUTION: Performed by: NURSE PRACTITIONER

## 2024-10-02 PROCEDURE — 85025 COMPLETE CBC W/AUTO DIFF WBC: CPT

## 2024-10-02 PROCEDURE — 36415 COLL VENOUS BLD VENIPUNCTURE: CPT

## 2024-10-02 PROCEDURE — 80053 COMPREHEN METABOLIC PANEL: CPT

## 2024-10-02 RX ADMIN — EPOETIN ALFA-EPBX 40000 UNITS: 40000 INJECTION, SOLUTION INTRAVENOUS; SUBCUTANEOUS at 09:20

## 2024-10-07 ENCOUNTER — OFFICE VISIT (OUTPATIENT)
Dept: INTERNAL MEDICINE | Facility: CLINIC | Age: 74
End: 2024-10-07
Payer: MEDICARE

## 2024-10-07 VITALS
BODY MASS INDEX: 19.94 KG/M2 | OXYGEN SATURATION: 94 % | HEIGHT: 72 IN | SYSTOLIC BLOOD PRESSURE: 142 MMHG | WEIGHT: 147.2 LBS | TEMPERATURE: 97.7 F | DIASTOLIC BLOOD PRESSURE: 56 MMHG | HEART RATE: 53 BPM

## 2024-10-07 DIAGNOSIS — J02.9 SORE THROAT: ICD-10-CM

## 2024-10-07 DIAGNOSIS — J02.9 PHARYNGITIS, UNSPECIFIED ETIOLOGY: Primary | ICD-10-CM

## 2024-10-07 LAB
EXPIRATION DATE: NORMAL
INTERNAL CONTROL: NORMAL
Lab: NORMAL
S PYO AG THROAT QL: NEGATIVE

## 2024-10-07 PROCEDURE — 3078F DIAST BP <80 MM HG: CPT

## 2024-10-07 PROCEDURE — 99213 OFFICE O/P EST LOW 20 MIN: CPT

## 2024-10-07 PROCEDURE — 87880 STREP A ASSAY W/OPTIC: CPT

## 2024-10-07 PROCEDURE — 1160F RVW MEDS BY RX/DR IN RCRD: CPT

## 2024-10-07 PROCEDURE — 3077F SYST BP >= 140 MM HG: CPT

## 2024-10-07 PROCEDURE — 1125F AMNT PAIN NOTED PAIN PRSNT: CPT

## 2024-10-07 PROCEDURE — 1159F MED LIST DOCD IN RCRD: CPT

## 2024-10-07 RX ORDER — DIPHENHYDRAMINE, LIDOCAINE, NYSTATIN
5 KIT ORAL 4 TIMES DAILY
Qty: 118 ML | Refills: 0 | Status: SHIPPED | OUTPATIENT
Start: 2024-10-07

## 2024-10-07 NOTE — PROGRESS NOTES
Subjective   Ricardo Hugo is a 74 y.o. male.   Chief Complaint   Patient presents with    Sore Throat     Patient complains of sore throat x this morning.   Experiencing nasal congestion.    Denies cough, fever, body chills, muscle aches.        Harjinder presents to the office today with complaints of sore throat x 1 day.  He explains that he woke up this morning with a sore throat that feels primarily located on the left side of his throat he has also noted some lymph node tenderness on the left side of his jaw.  He denies any fever, chills, headache, reports his sinuses are not any more congested than usual, he does report he suffers from chronic allergies, he reports he has been consistent with antiallergy treatment.  He denies any ear pain, cough, shortness of breath, fever, chills, nausea, diarrhea or abdominal pain.  He states he did not do anything different yesterday, he has been eating as usual.  He does report a friend of his let him borrow some of his Magic mouthwash and this seems to have offer the most therapeutic benefit.  He has also been using cough drops.    The following portions of the patient's history were reviewed and updated as appropriate: allergies, current medications, past family history, past medical history, past social history, past surgical history and problem list.    Review of Systems   HENT:  Positive for sore throat.        Objective   Past Medical History:   Diagnosis Date    3-vessel CAD 8/11/2020    Allergic rhinitis     Anxiety disorder 4/27/2020    Arthritis     Asymmetrical sensorineural hearing loss 6/28/2017    Atherosclerosis of native artery of both lower extremities with intermittent claudication 7/18/2019    Avascular necrosis of femoral head, left 07/11/2020    right hip after surgery    Carotid stenosis     Chronic mucoid otitis media     Chronic rhinitis     Coronary artery disease     HEART BYPASS 2004    Crohn's disease of large intestine with other complication  7/30/2020    Chronic diarrhea Colonoscopy July 2020 revealed mild patchy scattered hemosiderin staining with inflammation more so in rectosigmoid area.  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    Iron deficiency anemia 7/14/2020    Mixed hearing loss of left ear     PAD (peripheral artery disease) 8/11/2020    Perianal abscess     Pernicious anemia 08/17/2020    took shots but never diagnosed with b12 deficiency    Personal history of alcoholism 08/11/2020    quit drinking in 2013    Prostatic hypertrophy 8/11/2020    Sensorineural hearing loss     Sepsis with acute renal failure 9/15/2020    Shortness of breath 5/27/2021    Tinnitus     Ventricular tachycardia, nonsustained 7/14/2020    Weight loss 7/11/2020      Past Surgical History:   Procedure Laterality Date    ARTERY SURGERY  2021    right side on neck    CAROTID ENDARTERECTOMY Right 5/10/2021    Procedure: RIGHT CAROTID ENDARTERECTOMY WITH EEG;  Surgeon: Gil Pineda DO;  Location: [facility] OR ;  Service: Vascular;  Laterality: Right;    COLONOSCOPY N/A 7/2/2020    Procedure: COLONOSCOPY WITH ANESTHESIA;  Surgeon: Adrien Brewster MD;  Location: [facility] ENDOSCOPY;  Service: Gastroenterology;  Laterality: N/A;  pre op: diarrhea  post op: polyps  PCP: Joe Velasco MD    COLONOSCOPY N/A 10/13/2020    Procedure: COLONOSCOPY WITH ANESTHESIA;  Surgeon: Adrien Brewster MD;  Location: [facility] ENDOSCOPY;  Service: Gastroenterology;  Laterality: N/A;  Pre: Chronic Diarrhea, Crohn's  Post: AVM  Dr. Neftali Velasco  CO2 Inflation Used    COLONOSCOPY N/A 12/8/2023    Procedure: COLONOSCOPY WITH ANESTHESIA;  Surgeon:  Adrien Brewster MD;  Location: [facility] ENDOSCOPY;  Service: Gastroenterology;  Laterality: N/A;  pre chrone's disease  post sub optimal prep, polyp, chrone's      CORONARY ARTERY BYPASS GRAFT  2003    x3    ENDOSCOPY N/A 11/2/2021    Procedure: ESOPHAGOGASTRODUODENOSCOPY WITH ANESTHESIA;  Surgeon: Bridger Bell MD;  Location: [facility] ENDOSCOPY;  Service: Gastroenterology;  Laterality: N/A;  pre anemia;gi bleed  post  [facility]    ENDOSCOPY N/A 10/10/2023    Procedure: ESOPHAGOGASTRODUODENOSCOPY WITH ANESTHESIA;  Surgeon: Adrien Brewster MD;  Location: [facility] ENDOSCOPY;  Service: Gastroenterology;  Laterality: N/A;  preop; anemia  postop esophagitis ; R/O barretts   PCP Randall Beata    EYE SURGERY Bilateral     catorac    INCISION AND DRAINAGE PERIRECTAL ABSCESS N/A 3/3/2017    Procedure: INCISION AND DRAINAGE OF JEET ANAL ABSCESS;  Surgeon: Lynette Smith MD;  Location: [facility] OR;  Service:     INGUINAL HERNIA REPAIR Bilateral 6/27/2023    Procedure: INGUINAL HERNIA BILATERAL REPAIR LAPAROSCOPIC WITH DAVINCI ROBOT WITH MESH;  Surgeon: Tahira Rivera MD;  Location: [facility] OR;  Service: Robotics - DaVinci;  Laterality: Bilateral;    MYRINGOTOMY W/ TUBES Left 04/17/2017    06/10/2016    TONSILLECTOMY      TOTAL HIP ARTHROPLASTY Right 2006        Current Outpatient Medications:     albuterol sulfate  (90 Base) MCG/ACT inhaler, USE 2 INHALATIONS FOUR TIMES A DAY AS NEEDED FOR SHORTNESS OF AIR OR WHEEZING, Disp: 17 g, Rfl: 6    ALPRAZolam (XANAX) 0.25 MG tablet, Take 1 tablet by mouth Every Night., Disp: 90 tablet, Rfl: 1    aspirin (aspirin) 81 MG EC tablet, Take  by mouth Daily., Disp: , Rfl:     atorvastatin (LIPITOR) 10 MG tablet, Take 1 tablet by mouth Daily., Disp: 90 tablet, Rfl: 3    azelastine (ASTELIN) 0.1 % nasal spray, USE 1 SPRAY IN EACH NOSTRIL TWICE A DAY AS NEEDED, Disp: 90 mL, Rfl: 1    carvedilol (COREG) 25 MG tablet, Take 1 tablet by mouth 2  (Two) Times a Day With Meals., Disp: 180 tablet, Rfl: 3    cholestyramine (QUESTRAN) 4 g packet, MIX AND DRINK 1 PACKET DAILY, Disp: 90 packet, Rfl: 3    ciprofloxacin-dexAMETHasone (Ciprodex) 0.3-0.1 % otic suspension, Administer 4 drops to the right ear 2 (Two) Times a Day., Disp: 7.5 mL, Rfl: 1    cloNIDine (CATAPRES) 0.3 MG tablet, Take 1 tablet by mouth 3 (Three) Times a Day., Disp: 270 tablet, Rfl: 2    clopidogrel (PLAVIX) 75 MG tablet, TAKE 1 TABLET DAILY, Disp: 90 tablet, Rfl: 3    diphenhydrAMINE HCl, Sleep, (ZzzQuil) 25 MG capsule, Take  by mouth Every Night., Disp: , Rfl:     fexofenadine (ALLEGRA) 180 MG tablet, Take 1 tablet by mouth Daily., Disp: , Rfl:     fluticasone (FLONASE) 50 MCG/ACT nasal spray, USE 2 SPRAYS INTO THE  NOSTRIL(S) AS DIRECTED BY PROVIDER DAILY AS NEEDED FOR RHINITIS, Disp: 48 g, Rfl: 3    furosemide (Lasix) 20 MG tablet, Take 1 tablet by mouth 3 (Three) Times a Day., Disp: 270 tablet, Rfl: 3    hydrALAZINE (APRESOLINE) 100 MG tablet, Take 1 tablet by mouth 3 (Three) Times a Day. 100mg daily, Disp: , Rfl:     levothyroxine (Synthroid) 75 MCG tablet, Take 1 tablet by mouth Daily., Disp: 90 tablet, Rfl: 3    magnesium chloride ER 64 MG DR tablet, Take 143 mg by mouth Daily., Disp: , Rfl:     Melatonin 10 MG capsule, Take 2 capsules by mouth Every Night., Disp: , Rfl:     mesalamine (APRISO) 0.375 g 24 hr capsule, TAKE 4 CAPSULES DAILY, Disp: 360 capsule, Rfl: 3    montelukast (SINGULAIR) 10 MG tablet, TAKE 1 TABLET EVERY NIGHT, Disp: 90 tablet, Rfl: 3    Multiple Vitamins-Minerals (PRESERVISION/LUTEIN) capsule, Take 1 capsule by mouth 2 (two) times a day., Disp: , Rfl:     NIFEdipine CC (ADALAT CC) 90 MG 24 hr tablet, Take 1 tablet by mouth Daily., Disp: 90 tablet, Rfl: 3    omeprazole (priLOSEC) 20 MG capsule, TAKE 1 CAPSULE DAILY, Disp: 90 capsule, Rfl: 1    Oxymetazoline HCl (Nasal Spray) 0.05 % solution, , Disp: , Rfl:     phenylephrine (MAYELA-SYNEPHRINE) 1 % nasal spray, 1 spray  "into the nostril(s) as directed by provider Daily As Needed (ear bleeding)., Disp: 1 each, Rfl: 0    sodium bicarbonate 650 MG tablet, 2 qam, 1 at noon, and 2 qpm, Disp: 450 tablet, Rfl: 3    spironolactone (ALDACTONE) 25 MG tablet, Take 1 tablet by mouth Daily., Disp: 90 tablet, Rfl: 2    valsartan (DIOVAN) 320 MG tablet, Take 1 tablet by mouth Daily., Disp: , Rfl:     vitamin D (ERGOCALCIFEROL) 1.25 MG (96267 UT) capsule capsule, Take 1 capsule by mouth 1 (One) Time Per Week., Disp: 12 capsule, Rfl: 3    nystatin susp + lidocaine viscous (MAGIC MOUTHWASH) oral suspension, Swish and swallow 5 mL 4 (Four) Times a Day., Disp: 118 mL, Rfl: 0    phenol (CHLORASEPTIC) 1.4 % liquid liquid, Apply 1 spray to the mouth or throat Every 2 (Two) Hours As Needed (sore throat)., Disp: 118 mL, Rfl: 0    Current Facility-Administered Medications:     cyanocobalamin injection 1,000 mcg, 1,000 mcg, Intramuscular, Q28 Days, Fidel, Marshall C, APRN, 1,000 mcg at 08/11/23 1123      /56 (BP Location: Left arm, Patient Position: Sitting, Cuff Size: Adult)   Pulse 53   Temp 97.7 °F (36.5 °C) (Infrared)   Ht 182.9 cm (72.01\")   Wt 66.8 kg (147 lb 3.2 oz)   SpO2 94%   BMI 19.96 kg/m²      Body mass index is 19.96 kg/m².  BMI is within normal parameters. No other follow-up for BMI required.       Physical Exam  Vitals and nursing note reviewed.   Constitutional:       General: He is not in acute distress.     Appearance: Normal appearance. He is normal weight. He is not ill-appearing, toxic-appearing or diaphoretic.   HENT:      Head: Normocephalic and atraumatic.      Right Ear: Tympanic membrane, ear canal and external ear normal. There is no impacted cerumen.      Left Ear: Tympanic membrane, ear canal and external ear normal. There is no impacted cerumen.      Nose: Congestion and rhinorrhea present.      Mouth/Throat:      Pharynx: Posterior oropharyngeal erythema present. No pharyngeal swelling, oropharyngeal exudate or " uvula swelling.   Eyes:      Extraocular Movements: Extraocular movements intact.      Conjunctiva/sclera: Conjunctivae normal.      Pupils: Pupils are equal, round, and reactive to light.   Cardiovascular:      Rate and Rhythm: Normal rate.      Pulses: Normal pulses.      Heart sounds: Murmur heard.   Pulmonary:      Effort: Pulmonary effort is normal.      Breath sounds: Normal breath sounds.   Abdominal:      General: Bowel sounds are normal.      Palpations: Abdomen is soft.   Musculoskeletal:         General: Normal range of motion.      Cervical back: Normal range of motion and neck supple.   Lymphadenopathy:      Head:      Left side of head: Submandibular adenopathy present.   Skin:     General: Skin is warm and dry.   Neurological:      General: No focal deficit present.      Mental Status: He is alert and oriented to person, place, and time. Mental status is at baseline.   Psychiatric:         Mood and Affect: Mood normal.         Behavior: Behavior normal.         Thought Content: Thought content normal.         Judgment: Judgment normal.               Assessment & Plan   Diagnoses and all orders for this visit:    1. Pharyngitis, unspecified etiology (Primary)  -     phenol (CHLORASEPTIC) 1.4 % liquid liquid; Apply 1 spray to the mouth or throat Every 2 (Two) Hours As Needed (sore throat).  Dispense: 118 mL; Refill: 0  -     nystatin susp + lidocaine viscous (MAGIC MOUTHWASH) oral suspension; Swish and swallow 5 mL 4 (Four) Times a Day.  Dispense: 118 mL; Refill: 0    2. Sore throat  -     POC Rapid Strep A               Plan of care reviewed with Harjinder  Rapid testing for strep in office today was negative  Discussed how his symptoms could be related to allergies or could be viral in nature, antibiotic treatment is not warranted at this time however I did emphasize the importance of him notifying us if symptoms do not gradually improve over the next several days or if they increase in severity.  I advised  to manage symptoms via hot beverages, warm salt water gargles, he may use the Magic mouthwash although I did advise to initially try other measures first including the Chloraseptic spray.  Recommended to continue to adhere to current allergy treatment regimen.  He does report obtaining influenza and COVID-19 vaccine updates 2 weeks ago, these were documented.  He also advised that he got a pneumonia vaccine 2 weeks ago, he has been educated that he should no longer require pneumonia vaccination unless recommendations change.  He has also been advised since he has had the RSV vaccine and that he does not need another one unless recommendations change.  Encouraged to return to the office at his next scheduled appointment with as needed appointments prn.       Please note that portions of this note were completed with a voice recognition program.     Electronically signed by STERLING Lui, 10/07/24, 10:09 CDT.

## 2024-10-16 ENCOUNTER — LAB (OUTPATIENT)
Dept: LAB | Facility: HOSPITAL | Age: 74
End: 2024-10-16
Payer: MEDICARE

## 2024-10-16 ENCOUNTER — INFUSION (OUTPATIENT)
Dept: ONCOLOGY | Facility: HOSPITAL | Age: 74
End: 2024-10-16
Payer: MEDICARE

## 2024-10-16 VITALS
SYSTOLIC BLOOD PRESSURE: 128 MMHG | HEART RATE: 51 BPM | DIASTOLIC BLOOD PRESSURE: 41 MMHG | WEIGHT: 148.6 LBS | TEMPERATURE: 96.9 F | BODY MASS INDEX: 20.13 KG/M2 | RESPIRATION RATE: 18 BRPM | HEIGHT: 72 IN | OXYGEN SATURATION: 95 %

## 2024-10-16 DIAGNOSIS — D63.1 ANEMIA DUE TO STAGE 4 CHRONIC KIDNEY DISEASE: Primary | ICD-10-CM

## 2024-10-16 DIAGNOSIS — N18.4 ANEMIA DUE TO STAGE 4 CHRONIC KIDNEY DISEASE: ICD-10-CM

## 2024-10-16 DIAGNOSIS — D63.1 ANEMIA DUE TO STAGE 4 CHRONIC KIDNEY DISEASE: ICD-10-CM

## 2024-10-16 DIAGNOSIS — N18.4 ANEMIA DUE TO STAGE 4 CHRONIC KIDNEY DISEASE: Primary | ICD-10-CM

## 2024-10-16 LAB
BASOPHILS # BLD AUTO: 0.04 10*3/MM3 (ref 0–0.2)
BASOPHILS NFR BLD AUTO: 0.9 % (ref 0–1.5)
DEPRECATED RDW RBC AUTO: 58.4 FL (ref 37–54)
EOSINOPHIL # BLD AUTO: 0.23 10*3/MM3 (ref 0–0.4)
EOSINOPHIL NFR BLD AUTO: 5.3 % (ref 0.3–6.2)
ERYTHROCYTE [DISTWIDTH] IN BLOOD BY AUTOMATED COUNT: 15.5 % (ref 12.3–15.4)
HCT VFR BLD AUTO: 28.9 % (ref 37.5–51)
HGB BLD-MCNC: 9 G/DL (ref 13–17.7)
IMM GRANULOCYTES # BLD AUTO: 0.02 10*3/MM3 (ref 0–0.05)
IMM GRANULOCYTES NFR BLD AUTO: 0.5 % (ref 0–0.5)
LYMPHOCYTES # BLD AUTO: 0.96 10*3/MM3 (ref 0.7–3.1)
LYMPHOCYTES NFR BLD AUTO: 21.9 % (ref 19.6–45.3)
MCH RBC QN AUTO: 32 PG (ref 26.6–33)
MCHC RBC AUTO-ENTMCNC: 31.1 G/DL (ref 31.5–35.7)
MCV RBC AUTO: 102.8 FL (ref 79–97)
MONOCYTES # BLD AUTO: 0.51 10*3/MM3 (ref 0.1–0.9)
MONOCYTES NFR BLD AUTO: 11.6 % (ref 5–12)
NEUTROPHILS NFR BLD AUTO: 2.62 10*3/MM3 (ref 1.7–7)
NEUTROPHILS NFR BLD AUTO: 59.8 % (ref 42.7–76)
NRBC BLD AUTO-RTO: 0 /100 WBC (ref 0–0.2)
PLATELET # BLD AUTO: 96 10*3/MM3 (ref 140–450)
PMV BLD AUTO: 10 FL (ref 6–12)
RBC # BLD AUTO: 2.81 10*6/MM3 (ref 4.14–5.8)
WBC NRBC COR # BLD AUTO: 4.38 10*3/MM3 (ref 3.4–10.8)

## 2024-10-16 PROCEDURE — 25010000002 EPOETIN ALFA-EPBX 40000 UNIT/ML SOLUTION: Performed by: NURSE PRACTITIONER

## 2024-10-16 PROCEDURE — 85025 COMPLETE CBC W/AUTO DIFF WBC: CPT

## 2024-10-16 PROCEDURE — 36415 COLL VENOUS BLD VENIPUNCTURE: CPT

## 2024-10-16 PROCEDURE — 96372 THER/PROPH/DIAG INJ SC/IM: CPT

## 2024-10-16 RX ADMIN — EPOETIN ALFA-EPBX 40000 UNITS: 40000 INJECTION, SOLUTION INTRAVENOUS; SUBCUTANEOUS at 09:13

## 2024-10-17 RX ORDER — CHOLESTYRAMINE 4 G/9G
POWDER, FOR SUSPENSION ORAL
Qty: 90 PACKET | Refills: 0 | Status: SHIPPED | OUTPATIENT
Start: 2024-10-17

## 2024-10-25 DIAGNOSIS — I25.9 IHD (ISCHEMIC HEART DISEASE): ICD-10-CM

## 2024-10-25 DIAGNOSIS — I51.89 DIASTOLIC DYSFUNCTION: ICD-10-CM

## 2024-10-25 DIAGNOSIS — I1A.0 RESISTANT HYPERTENSION: ICD-10-CM

## 2024-10-25 RX ORDER — ALPRAZOLAM 0.25 MG
0.25 TABLET ORAL NIGHTLY
Qty: 90 TABLET | Refills: 1 | Status: SHIPPED | OUTPATIENT
Start: 2024-10-25

## 2024-10-30 ENCOUNTER — INFUSION (OUTPATIENT)
Dept: ONCOLOGY | Facility: HOSPITAL | Age: 74
End: 2024-10-30
Payer: MEDICARE

## 2024-10-30 ENCOUNTER — OFFICE VISIT (OUTPATIENT)
Dept: ONCOLOGY | Facility: CLINIC | Age: 74
End: 2024-10-30
Payer: MEDICARE

## 2024-10-30 ENCOUNTER — LAB (OUTPATIENT)
Dept: LAB | Facility: HOSPITAL | Age: 74
End: 2024-10-30
Payer: MEDICARE

## 2024-10-30 VITALS
HEIGHT: 72 IN | OXYGEN SATURATION: 97 % | RESPIRATION RATE: 18 BRPM | DIASTOLIC BLOOD PRESSURE: 64 MMHG | WEIGHT: 147.2 LBS | HEART RATE: 52 BPM | BODY MASS INDEX: 19.94 KG/M2 | SYSTOLIC BLOOD PRESSURE: 136 MMHG | TEMPERATURE: 97.7 F

## 2024-10-30 DIAGNOSIS — D69.6 THROMBOCYTOPENIA: ICD-10-CM

## 2024-10-30 DIAGNOSIS — D50.9 IRON DEFICIENCY ANEMIA, UNSPECIFIED IRON DEFICIENCY ANEMIA TYPE: ICD-10-CM

## 2024-10-30 DIAGNOSIS — K74.60 HEPATIC CIRRHOSIS, UNSPECIFIED HEPATIC CIRRHOSIS TYPE, UNSPECIFIED WHETHER ASCITES PRESENT: ICD-10-CM

## 2024-10-30 DIAGNOSIS — D63.1 ANEMIA DUE TO STAGE 4 CHRONIC KIDNEY DISEASE: ICD-10-CM

## 2024-10-30 DIAGNOSIS — D63.1 ANEMIA DUE TO STAGE 4 CHRONIC KIDNEY DISEASE: Primary | ICD-10-CM

## 2024-10-30 DIAGNOSIS — N18.4 ANEMIA DUE TO STAGE 4 CHRONIC KIDNEY DISEASE: Primary | ICD-10-CM

## 2024-10-30 DIAGNOSIS — N18.4 ANEMIA DUE TO STAGE 4 CHRONIC KIDNEY DISEASE: ICD-10-CM

## 2024-10-30 DIAGNOSIS — C91.10 CLL (CHRONIC LYMPHOCYTIC LEUKEMIA): Primary | ICD-10-CM

## 2024-10-30 DIAGNOSIS — C91.10 CLL (CHRONIC LYMPHOCYTIC LEUKEMIA): ICD-10-CM

## 2024-10-30 LAB
ALBUMIN SERPL-MCNC: 3.6 G/DL (ref 3.5–5.2)
ALBUMIN/GLOB SERPL: 1.4 G/DL
ALP SERPL-CCNC: 157 U/L (ref 39–117)
ALT SERPL W P-5'-P-CCNC: 6 U/L (ref 1–41)
ANION GAP SERPL CALCULATED.3IONS-SCNC: 16 MMOL/L (ref 5–15)
AST SERPL-CCNC: 8 U/L (ref 1–40)
BASOPHILS # BLD AUTO: 0.05 10*3/MM3 (ref 0–0.2)
BASOPHILS NFR BLD AUTO: 0.9 % (ref 0–1.5)
BILIRUB SERPL-MCNC: 0.4 MG/DL (ref 0–1.2)
BUN SERPL-MCNC: 62 MG/DL (ref 8–23)
BUN/CREAT SERPL: 18 (ref 7–25)
CALCIUM SPEC-SCNC: 8.6 MG/DL (ref 8.6–10.5)
CHLORIDE SERPL-SCNC: 105 MMOL/L (ref 98–107)
CO2 SERPL-SCNC: 19 MMOL/L (ref 22–29)
CREAT SERPL-MCNC: 3.45 MG/DL (ref 0.76–1.27)
DEPRECATED RDW RBC AUTO: 57.9 FL (ref 37–54)
EGFRCR SERPLBLD CKD-EPI 2021: 17.6 ML/MIN/1.73
EOSINOPHIL # BLD AUTO: 0.24 10*3/MM3 (ref 0–0.4)
EOSINOPHIL NFR BLD AUTO: 4.5 % (ref 0.3–6.2)
ERYTHROCYTE [DISTWIDTH] IN BLOOD BY AUTOMATED COUNT: 15.1 % (ref 12.3–15.4)
FERRITIN SERPL-MCNC: 199 NG/ML (ref 30–400)
FOLATE SERPL-MCNC: 10.3 NG/ML (ref 4.78–24.2)
GLOBULIN UR ELPH-MCNC: 2.6 GM/DL
GLUCOSE SERPL-MCNC: 127 MG/DL (ref 65–99)
HCT VFR BLD AUTO: 30.3 % (ref 37.5–51)
HGB BLD-MCNC: 9.2 G/DL (ref 13–17.7)
IMM GRANULOCYTES # BLD AUTO: 0.04 10*3/MM3 (ref 0–0.05)
IMM GRANULOCYTES NFR BLD AUTO: 0.8 % (ref 0–0.5)
IRON 24H UR-MRATE: 67 MCG/DL (ref 59–158)
IRON SATN MFR SERPL: 25 % (ref 20–50)
LYMPHOCYTES # BLD AUTO: 0.89 10*3/MM3 (ref 0.7–3.1)
LYMPHOCYTES NFR BLD AUTO: 16.8 % (ref 19.6–45.3)
MCH RBC QN AUTO: 31.4 PG (ref 26.6–33)
MCHC RBC AUTO-ENTMCNC: 30.4 G/DL (ref 31.5–35.7)
MCV RBC AUTO: 103.4 FL (ref 79–97)
MONOCYTES # BLD AUTO: 0.58 10*3/MM3 (ref 0.1–0.9)
MONOCYTES NFR BLD AUTO: 10.9 % (ref 5–12)
NEUTROPHILS NFR BLD AUTO: 3.5 10*3/MM3 (ref 1.7–7)
NEUTROPHILS NFR BLD AUTO: 66.1 % (ref 42.7–76)
NRBC BLD AUTO-RTO: 0 /100 WBC (ref 0–0.2)
PLATELET # BLD AUTO: 98 10*3/MM3 (ref 140–450)
PMV BLD AUTO: 10.2 FL (ref 6–12)
POTASSIUM SERPL-SCNC: 4.1 MMOL/L (ref 3.5–5.2)
PROT SERPL-MCNC: 6.2 G/DL (ref 6–8.5)
RBC # BLD AUTO: 2.93 10*6/MM3 (ref 4.14–5.8)
SODIUM SERPL-SCNC: 140 MMOL/L (ref 136–145)
TIBC SERPL-MCNC: 273 MCG/DL (ref 298–536)
TRANSFERRIN SERPL-MCNC: 183 MG/DL (ref 200–360)
VIT B12 BLD-MCNC: 361 PG/ML (ref 211–946)
WBC NRBC COR # BLD AUTO: 5.3 10*3/MM3 (ref 3.4–10.8)

## 2024-10-30 PROCEDURE — 82728 ASSAY OF FERRITIN: CPT

## 2024-10-30 PROCEDURE — 25010000002 EPOETIN ALFA-EPBX 40000 UNIT/ML SOLUTION: Performed by: NURSE PRACTITIONER

## 2024-10-30 PROCEDURE — 36415 COLL VENOUS BLD VENIPUNCTURE: CPT

## 2024-10-30 PROCEDURE — 83540 ASSAY OF IRON: CPT

## 2024-10-30 PROCEDURE — 82746 ASSAY OF FOLIC ACID SERUM: CPT

## 2024-10-30 PROCEDURE — 80053 COMPREHEN METABOLIC PANEL: CPT

## 2024-10-30 PROCEDURE — 84466 ASSAY OF TRANSFERRIN: CPT

## 2024-10-30 PROCEDURE — 96372 THER/PROPH/DIAG INJ SC/IM: CPT

## 2024-10-30 PROCEDURE — 85025 COMPLETE CBC W/AUTO DIFF WBC: CPT

## 2024-10-30 PROCEDURE — 82607 VITAMIN B-12: CPT

## 2024-10-30 RX ADMIN — EPOETIN ALFA-EPBX 40000 UNITS: 40000 INJECTION, SOLUTION INTRAVENOUS; SUBCUTANEOUS at 10:09

## 2024-10-30 NOTE — PROGRESS NOTES
[facility] HEMATOLOGY & ONCOLOGY  [address]  [facility] [address] [phone]    Patient Name: Ricardo Hugo  Encounter Date: 10/30/2024  YOB: 1950   Patient Number: [id number]    Hematology / Oncology Progress Note      HPI / REASON FOR VISIT: Ricardo Hugo is a 74 y.o. male who is followed by this office for CLL, Iron Deficiency Anemia, Chronic Kidney Disease.  He sees Dr. Rueda for nephrology.      Bone marrow biopsy done 7/12/2021:  with a flow cytometry showing a 2% monoclonal B-cell kappa population consistent with CLL/SLL.    CLL panel:  Negative for a t (11;14)/CCND1-IGH rearrangement  Negative for deletion of MARIA on the long arm of chromosome 11 q. 22  Negative for trisomy 12  Negative for deletion of DLEU1, DLEU2 on the long arm of chromosome 13 q. 14 and monosomy 13  Negative for deletion T p53 in the short arm of chromosome 17 P 13  Cytogenetics showed normal male karyotype  Bone marrow biopsy and aspirate show involvement by chronic lymphocytic leukemia/small lymphocytic lymphoma and up to 5% of cellularity, mild hypercellular marrow with maturing trilineage hematopoiesis, erythroid hyperplasia and a mild atypical small lymphocytic infiltrate with a dominant nodular pattern  Storage iron is present    He has received Injectafer for WALE and Retacrit for anemia in CKD in the past.    INTERVAL HISTORY  The patient is a 74-year-old male who presents for evaluation of iron deficiency, anemia, chronic kidney disease, and Chronic Lymphocytic Leukemia (CLL).    He reports feeling well overall with no specific issues. He receives Retacrit injections biweekly. He denies experiencing any bleeding, fever, or night sweats.     He had labs drawn and results were reviewed with him in office.       I have reviewed the HPI and verified with the patient the accuracy of it. No changes to history since the information was  documented.  Becky ZARAGOZA Doug, APRN.  10/30/2024        LABS    Lab Results - Last 18 Months   Lab Units 10/30/24  0841 10/16/24  0844 10/02/24  0849 09/18/24  0835 09/04/24  0838 08/21/24  0904 08/03/23  0829 07/18/23  1038   HEMOGLOBIN g/dL 9.2* 9.0* 9.4* 9.2* 9.1* 9.4*   < > 7.0*   HEMATOCRIT % 30.3* 28.9* 30.1* 30.1* 29.4* 29.9*   < > 23.4*   MCV fL 103.4* 102.8* 102.7* 102.7* 102.1* 101.4*   < > 105.4*   WBC 10*3/mm3 5.30 4.38 4.27 5.97 6.66 5.09   < > 5.13   RDW % 15.1 15.5* 16.2* 16.0* 15.9* 15.4   < > 17.2*   MPV fL 10.2 10.0 10.1 9.8 10.2 9.9   < > 10.0   PLATELETS 10*3/mm3 98* 96* 98* 104* 102* 102*   < > 122*   IMM GRAN % % 0.8* 0.5 0.5 0.3 0.5 0.4   < >  --    NEUTROS ABS 10*3/mm3 3.50 2.62 2.86 4.37 4.66 3.17   < > 4.40   LYMPHS ABS 10*3/mm3 0.89 0.96 0.75 0.81 0.91 1.04   < >  --    MONOS ABS 10*3/mm3 0.58 0.51 0.44 0.55 0.82 0.58   < >  --    EOS ABS 10*3/mm3 0.24 0.23 0.17 0.18 0.21 0.24   < >  --    BASOS ABS 10*3/mm3 0.05 0.04 0.03 0.04 0.03 0.04   < >  --    IMMATURE GRANS (ABS) 10*3/mm3 0.04 0.02 0.02 0.02 0.03 0.02   < >  --    NRBC /100 WBC 0.0 0.0 0.0 0.0 0.0 0.0   < >  --    NEUTROPHIL % %  --   --   --   --   --   --   --  84.7*   MONOCYTES % %  --   --   --   --   --   --   --  4.1*    < > = values in this interval not displayed.       Lab Results - Last 18 Months   Lab Units 10/30/24  0841 10/02/24  0849 09/18/24  0835 09/10/24  0842 09/04/24  0838 08/21/24  0904 08/07/24  0850   GLUCOSE mg/dL 127* 103* 154* 148* 129* 107* 146*   SODIUM mmol/L 140 140 137 141 135* 137 139   POTASSIUM mmol/L 4.1 4.7 4.4 4.3 4.0 4.5 4.6   CO2 mmol/L 19.0* 20.0* 19.0* 20.8* 19.0* 20.0* 20.0*   CHLORIDE mmol/L 105 106 103 105 103 102 103   ANION GAP mmol/L 16.0* 14.0 15.0  --  13.0 15.0 16.0*   CREATININE mg/dL 3.45* 3.61* 3.64* 3.49* 3.96* 3.52* 3.47*   BUN mg/dL 62* 76* 73* 65* 65* 62* 74*   BUN / CREAT RATIO  18.0 21.1 20.1 18.6 16.4 17.6 21.3   CALCIUM mg/dL 8.6 8.6 8.6 8.6 8.3* 8.0* 8.9   ALK PHOS U/L 157*  197* 166*  --  171* 172* 186*   TOTAL PROTEIN g/dL 6.2 6.3 6.4  --  6.5 6.6 6.6   ALT (SGPT) U/L 6 8 6  --  6 8 12   AST (SGOT) U/L 8 12 14  --  9 13 16   BILIRUBIN mg/dL 0.4 0.3 0.5  --  0.5 0.4 0.3   ALBUMIN g/dL 3.6 3.8 3.8  --  3.8 3.9 3.8   GLOBULIN gm/dL 2.6 2.5 2.6  --  2.7 2.7 2.8       Lab Results - Last 18 Months   Lab Units 09/18/24  0835 08/21/24  0904 08/07/24  0850 05/22/24  0849 02/14/24  0937 11/08/23  0827   URIC ACID mg/dL  --  7.7*  --  9.0* 8.0* 7.5*   LDH U/L 182  --  191  --   --   --          Lab Results - Last 18 Months   Lab Units 10/30/24  0841 09/18/24  0835 08/07/24  0850 05/08/24  0946 02/28/24  0850 02/14/24  0937 11/22/23  0918   IRON mcg/dL 67 87 95 87  --  89 79   TIBC mcg/dL 273* 280* 282* 285*  --  235* 244*   IRON SATURATION (TSAT) % 25 31 34 31  --  38 32   FERRITIN ng/mL 199.00 203.40 249.10 170.40  --  315.30 218.70   TSH uIU/mL  --   --   --   --  5.790*  --   --          PAST MEDICAL HISTORY:  ALLERGIES:  Allergies   Allergen Reactions    Ondansetron Anaphylaxis and GI Intolerance    Zofran [Ondansetron Hcl] Anaphylaxis    Lortab [Hydrocodone-Acetaminophen] Other (See Comments) and Hallucinations     CLOSTROPHOBIC    Allopurinol Other (See Comments)     Pain on right side     CURRENT MEDICATIONS:  Outpatient Encounter Medications as of 10/30/2024   Medication Sig Dispense Refill    albuterol sulfate  (90 Base) MCG/ACT inhaler USE 2 INHALATIONS FOUR TIMES A DAY AS NEEDED FOR SHORTNESS OF AIR OR WHEEZING 17 g 6    ALPRAZolam (XANAX) 0.25 MG tablet TAKE 1 TABLET BY MOUTH EVERY NIGHT 90 tablet 1    aspirin (aspirin) 81 MG EC tablet Take  by mouth Daily.      atorvastatin (LIPITOR) 10 MG tablet Take 1 tablet by mouth Daily. 90 tablet 3    azelastine (ASTELIN) 0.1 % nasal spray USE 1 SPRAY IN EACH NOSTRIL TWICE A DAY AS NEEDED 90 mL 1    carvedilol (COREG) 25 MG tablet Take 1 tablet by mouth 2 (Two) Times a Day With Meals. 180 tablet 3    cholestyramine (QUESTRAN) 4 g packet  MIX AND DRINK 1 PACKET DAILY 90 packet 0    ciprofloxacin-dexAMETHasone (Ciprodex) 0.3-0.1 % otic suspension Administer 4 drops to the right ear 2 (Two) Times a Day. 7.5 mL 1    cloNIDine (CATAPRES) 0.3 MG tablet Take 1 tablet by mouth 3 (Three) Times a Day. 270 tablet 2    clopidogrel (PLAVIX) 75 MG tablet TAKE 1 TABLET DAILY 90 tablet 3    diphenhydrAMINE HCl, Sleep, (ZzzQuil) 25 MG capsule Take  by mouth Every Night.      fexofenadine (ALLEGRA) 180 MG tablet Take 1 tablet by mouth Daily.      fluticasone (FLONASE) 50 MCG/ACT nasal spray USE 2 SPRAYS INTO THE  NOSTRIL(S) AS DIRECTED BY PROVIDER DAILY AS NEEDED FOR RHINITIS 48 g 3    furosemide (Lasix) 20 MG tablet Take 1 tablet by mouth 3 (Three) Times a Day. 270 tablet 3    hydrALAZINE (APRESOLINE) 100 MG tablet Take 1 tablet by mouth 3 (Three) Times a Day. 100mg daily      levothyroxine (Synthroid) 75 MCG tablet Take 1 tablet by mouth Daily. 90 tablet 3    magnesium chloride ER 64 MG DR tablet Take 143 mg by mouth Daily.      Melatonin 10 MG capsule Take 2 capsules by mouth Every Night.      mesalamine (APRISO) 0.375 g 24 hr capsule TAKE 4 CAPSULES DAILY 360 capsule 3    montelukast (SINGULAIR) 10 MG tablet TAKE 1 TABLET EVERY NIGHT 90 tablet 3    Multiple Vitamins-Minerals (PRESERVISION/LUTEIN) capsule Take 1 capsule by mouth 2 (two) times a day.      NIFEdipine CC (ADALAT CC) 90 MG 24 hr tablet Take 1 tablet by mouth Daily. 90 tablet 3    nystatin susp + lidocaine viscous (MAGIC MOUTHWASH) oral suspension Swish and swallow 5 mL 4 (Four) Times a Day. 118 mL 0    omeprazole (priLOSEC) 20 MG capsule TAKE 1 CAPSULE DAILY 90 capsule 1    Oxymetazoline HCl (Nasal Spray) 0.05 % solution       phenol (CHLORASEPTIC) 1.4 % liquid liquid Apply 1 spray to the mouth or throat Every 2 (Two) Hours As Needed (sore throat). 118 mL 0    phenylephrine (MAYELA-SYNEPHRINE) 1 % nasal spray 1 spray into the nostril(s) as directed by provider Daily As Needed (ear bleeding). 1 each 0     sodium bicarbonate 650 MG tablet 2 qam, 1 at noon, and 2 qpm 450 tablet 3    spironolactone (ALDACTONE) 25 MG tablet Take 1 tablet by mouth Daily. 90 tablet 2    valsartan (DIOVAN) 320 MG tablet Take 1 tablet by mouth Daily.      vitamin D (ERGOCALCIFEROL) 1.25 MG (22603 UT) capsule capsule Take 1 capsule by mouth 1 (One) Time Per Week. 12 capsule 3     Facility-Administered Encounter Medications as of 10/30/2024   Medication Dose Route Frequency Provider Last Rate Last Admin    cyanocobalamin injection 1,000 mcg  1,000 mcg Intramuscular Q28 Days Ruthie Parra C, APRN   1,000 mcg at 08/11/23 1123     ADULT ILLNESSES:  Patient Active Problem List   Diagnosis Code    Chronic rhinitis J31.0    Resistant hypertension I1A.0    Chronic diarrhea K52.9    Symptomatic anemia D64.9    Wellness examination Z00.00    PAD (peripheral artery disease) I73.9    IHD (ischemic heart disease) I25.9    Carotid stenosis I65.29    Stenosis of right carotid artery I65.21    Carotid stenosis, right I65.21    Diastolic dysfunction I51.89    CRD (chronic renal disease), stage IV N18.4    Anemia due to chronic kidney disease N18.9, D63.1    Copper deficiency E61.0    Low iron  E61.1    CLL (chronic lymphocytic leukemia) C91.10    Perforation of left tympanic membrane H72.92    Mixed hyperlipidemia E78.2    Systolic murmur R01.1    Eustachian tube dysfunction, bilateral H69.93    Sensorineural hearing loss (SNHL), bilateral H90.3    Anticoagulated Z79.01    Nasal septal deviation J34.2    Hypertrophy of inferior nasal turbinate J34.3    Unilateral recurrent inguinal hernia without obstruction or gangrene K40.91    Bilateral inguinal hernia without obstruction or gangrene K40.20    Sleep difficulties G47.9    Dermatitis associated with moisture L30.8    Proteinuria R80.9     SURGERIES:  Past Surgical History:   Procedure Laterality Date    ARTERY SURGERY  2021    right side on neck    CAROTID ENDARTERECTOMY Right 5/10/2021    Procedure:  RIGHT CAROTID ENDARTERECTOMY WITH EEG;  Surgeon: Gil Pineda DO;  Location: [facility] HYBRID OR 12;  Service: Vascular;  Laterality: Right;    COLONOSCOPY N/A 7/2/2020    Procedure: COLONOSCOPY WITH ANESTHESIA;  Surgeon: Adrien Brewster MD;  Location: [facility] ENDOSCOPY;  Service: Gastroenterology;  Laterality: N/A;  pre op: diarrhea  post op: polyps  PCP: Joe Velasco MD    COLONOSCOPY N/A 10/13/2020    Procedure: COLONOSCOPY WITH ANESTHESIA;  Surgeon: Adrien Brewster MD;  Location: [facility] ENDOSCOPY;  Service: Gastroenterology;  Laterality: N/A;  Pre: Chronic Diarrhea, Crohn's  Post: AVM  Dr. Neftali Velasco  CO2 Inflation Used    COLONOSCOPY N/A 12/8/2023    Procedure: COLONOSCOPY WITH ANESTHESIA;  Surgeon: Adrien Brewster MD;  Location: [facility] ENDOSCOPY;  Service: Gastroenterology;  Laterality: N/A;  pre chrone's disease  post sub optimal prep, polyp, chrone's      CORONARY ARTERY BYPASS GRAFT  2003    x3    ENDOSCOPY N/A 11/2/2021    Procedure: ESOPHAGOGASTRODUODENOSCOPY WITH ANESTHESIA;  Surgeon: Bridger Bell MD;  Location: [facility] ENDOSCOPY;  Service: Gastroenterology;  Laterality: N/A;  pre anemia;gi bleed  post  gi bleed;schatski ring  Dr. ERIC Velasco    ENDOSCOPY N/A 10/10/2023    Procedure: ESOPHAGOGASTRODUODENOSCOPY WITH ANESTHESIA;  Surgeon: Adrien Brewster MD;  Location: [facility] ENDOSCOPY;  Service: Gastroenterology;  Laterality: N/A;  preop; anemia  postop esophagitis ; R/O barretts   PCP Randall Beata    EYE SURGERY Bilateral     catorac    INCISION AND DRAINAGE PERIRECTAL ABSCESS N/A 3/3/2017    Procedure: INCISION AND DRAINAGE OF JEET ANAL ABSCESS;  Surgeon: Lynette Smith MD;  Location: [facility] OR;  Service:     INGUINAL HERNIA REPAIR Bilateral 6/27/2023    Procedure: INGUINAL HERNIA BILATERAL REPAIR LAPAROSCOPIC WITH DAVINCI ROBOT WITH MESH;  Surgeon: Tahira Rivera MD;  Location: [facility] OR;  Service: Robotics - DaVinci;  Laterality: Bilateral;    MYRINGOTOMY W/  TUBES Left 04/17/2017    06/10/2016    TONSILLECTOMY      TOTAL HIP ARTHROPLASTY Right 2006     HEALTH MAINTENANCE ITEMS:  Health Maintenance Due   Topic Date Due    TDAP/TD VACCINES (1 - Tdap) Never done    Hepatitis B (1 of 3 - Risk 3-dose series) Never done       <no information>  Last Completed Colonoscopy            COLORECTAL CANCER SCREENING (COLONOSCOPY - Every 3 Years) Next due on 12/8/2026 12/08/2023  COLONOSCOPY    12/08/2023  Surgical Procedure: COLONOSCOPY    10/28/2021  POC Occult Blood Stool    10/13/2020  COLONOSCOPY    10/13/2020  Surgical Procedure: COLONOSCOPY    Only the first 5 history entries have been loaded, but more history exists.                  Immunization History   Administered Date(s) Administered    ABRYSVO (RSV, 60+ or pregnant women 32-36 wks) 09/13/2023    COVID-19 (MODERNA) 12YRS+ (SPIKEVAX) 09/19/2023    COVID-19 (MODERNA) 1st,2nd,3rd Dose Monovalent 02/24/2021, 03/24/2021, 08/30/2021, 03/30/2022    COVID-19 (MODERNA) BIVALENT 12+YRS 09/08/2022, 09/17/2024    Flu Vaccine Split Quad 09/30/2019    Fluad Quad 65+ 09/19/2023, 09/24/2024    Fluzone (or Fluarix & Flulaval for VFC) >6mos 09/22/2020    Fluzone High-Dose 65+yrs 10/12/2021, 09/08/2022    Fluzone Quad >6mos (Multi-dose) 09/25/2018, 09/30/2019, 09/22/2020    Influenza TIV (IM) 10/18/2017    Influenza Whole 09/28/2015    Pneumococcal Conjugate 13-Valent (PCV13) 12/28/2014    Pneumococcal Conjugate 20-Valent (PCV20) 02/16/2024    Pneumococcal Polysaccharide (PPSV23) 03/17/2006    Shingrix 12/04/2021, 02/02/2022    Zostavax 12/28/2014     Last Completed Mammogram       This patient has no relevant Health Maintenance data.              FAMILY HISTORY:  Family History   Problem Relation Age of Onset    Breast cancer Mother     Dementia Father     Glaucoma Father     No Known Problems Daughter     Colon polyps Neg Hx     Colon cancer Neg Hx      SOCIAL HISTORY:  Social History     Socioeconomic History    Marital status:  "   Tobacco Use    Smoking status: Former     Current packs/day: 0.00     Average packs/day: 0.5 packs/day for 25.8 years (12.9 ttl pk-yrs)     Types: Cigarettes     Start date:      Quit date: 10/13/2013     Years since quittin.0     Passive exposure: Past    Smokeless tobacco: Never    Tobacco comments:     quit 2013   Vaping Use    Vaping status: Never Used   Substance and Sexual Activity    Alcohol use: Not Currently    Drug use: No    Sexual activity: Not Currently     Partners: Female       REVIEW OF SYSTEMS:  Review of Systems   Constitutional:  Negative for activity change, appetite change, fatigue, fever, unexpected weight gain and unexpected weight loss.   HENT:  Negative for dental problem, facial swelling, swollen glands and trouble swallowing.    Eyes:  Negative for double vision and discharge.   Respiratory:  Negative for cough, shortness of breath and wheezing.    Cardiovascular:  Negative for chest pain, palpitations and leg swelling.   Gastrointestinal:  Negative for abdominal pain, blood in stool, nausea and vomiting.   Endocrine: Negative.    Genitourinary:  Negative for dysuria and hematuria.   Musculoskeletal:  Negative for arthralgias and myalgias.   Skin:  Negative for rash, skin lesions and wound.   Allergic/Immunologic: Negative for immunocompromised state.   Neurological:  Negative for speech difficulty, light-headedness, headache, memory problem and confusion.   Hematological:  Negative for adenopathy.   Psychiatric/Behavioral:  Negative for self-injury, suicidal ideas and depressed mood. The patient is not nervous/anxious.        /64   Pulse 52   Temp 97.7 °F (36.5 °C) (Tympanic)   Resp 18   Ht 182.9 cm (72.01\")   Wt 66.8 kg (147 lb 3.2 oz)   SpO2 97%   BMI 19.96 kg/m²  Body surface area is 1.87 meters squared.    Pain Score    10/30/24 0922   PainSc: 0-No pain           Physical Exam  Constitutional:       Appearance: Normal appearance.   HENT:      Head: " Normocephalic and atraumatic.   Cardiovascular:      Rate and Rhythm: Normal rate and regular rhythm.   Pulmonary:      Effort: Pulmonary effort is normal.      Breath sounds: Normal breath sounds.   Abdominal:      General: Bowel sounds are normal.      Palpations: Abdomen is soft.   Musculoskeletal:      Right lower leg: No edema.      Left lower leg: No edema.   Skin:     General: Skin is warm and dry.   Neurological:      Mental Status: He is alert and oriented to person, place, and time.   Psychiatric:         Attention and Perception: Attention normal.         Mood and Affect: Mood normal.         Judgment: Judgment normal.          I have reviewed the PHYSICAL EXAM and the accuracy of it. No changes since the information was documented.  Becky Camacho, APRN 10/30/2024        Ricardo Hugo reports a pain score of 0.  No intervention indicated         ASSESSMENT / PLAN    1. CLL (chronic lymphocytic leukemia)    2. Anemia due to stage 4 chronic kidney disease    3. Iron deficiency anemia, unspecified iron deficiency anemia type    4. Hepatic cirrhosis, unspecified hepatic cirrhosis type, unspecified whether ascites present    5. Thrombocytopenia        ASSESSMENT:         1.  CLL  - Clinical stage 0 from 7/20/2021:  Bone marrow biopsy done 7/12/2021:  with a flow cytometry showing a 2% monoclonal B-cell kappa population consistent with CLL/SLL.    CLL panel:  Negative for a t (11;14)/CCND1-IGH rearrangement  Negative for deletion of MARIA on the long arm of chromosome 11 q. 22  Negative for trisomy 12  Negative for deletion of DLEU1, DLEU2 on the long arm of chromosome 13 q. 14 and monosomy 13  Negative for deletion T p53 in the short arm of chromosome 17 P 13  Cytogenetics showed normal male karyotype  Bone marrow biopsy and aspirate show involvement by chronic lymphocytic leukemia/small lymphocytic lymphoma and up to 5% of cellularity, mild hypercellular marrow with maturing trilineage hematopoiesis,  erythroid hyperplasia and a mild atypical small lymphocytic infiltrate with a dominant nodular pattern  Storage iron is present  -Labs:  WBC 5.3, Differential normal.  Pt denies fever/night sweats, unintentional weight loss  -No s/s of progression  -Stable for observation     2.  Anemia in Chronic Kidney Disease Stage IV  3.  Iron Deficiency Anemia   -Had Injectafer December 1, 2022  -Currently receiving Retacrit for anemia  -October 16, 2024 was last Retacrit   -Labs today: BUN 62, creatinine 3.45, GFR 17.6, Hgb 9.2, HCT 30.3   Iron 67, ferritin 199, sat 25%, TIBC 273  -Will Continue Retacrit today and biweekly for Hgb < 11 Or HCT < 33   -Sees Dr. Francis, Nephrology ([facility] Tate)  -Offered to increase frequency of injections to weekly.  Patient states he feels well and that he is okay to stay biweekly for now.  He will let me know if that changes.    4. Cirrhosis of Liver  5.  Thrombocytopenia  -History of chronic alcoholism  - Cirrhotic morphology/appearance of the liver on 07/12/21 ultrasound   -Likely exacerbating anemia and thrombocytopenia  -No s/s of bleeding r/t thrombocytopenia   -Platelets today 98  -Stable for observation     PLAN:  Continue Retacrit 40K units  today and biweekly for Hgb < 11 or Hct < 33  -Continue current medications, treatment plans and follow up with PCP and any other specialist  Return to office in 3 months with CBC, CMP, Iron Profile and Ferritin   Care discussed with patient.  Understanding expressed.  Patient agreeable with plan.      Becky Camacho, [place]  10/30/2024

## 2024-11-11 ENCOUNTER — TELEPHONE (OUTPATIENT)
Dept: VASCULAR SURGERY | Facility: CLINIC | Age: 74
End: 2024-11-11
Payer: MEDICARE

## 2024-11-12 ENCOUNTER — HOSPITAL ENCOUNTER (OUTPATIENT)
Dept: ULTRASOUND IMAGING | Facility: HOSPITAL | Age: 74
Discharge: HOME | End: 2024-11-12
Payer: MEDICARE

## 2024-11-12 ENCOUNTER — OFFICE VISIT (OUTPATIENT)
Dept: VASCULAR SURGERY | Facility: CLINIC | Age: 74
End: 2024-11-12
Payer: MEDICARE

## 2024-11-12 VITALS
OXYGEN SATURATION: 98 % | DIASTOLIC BLOOD PRESSURE: 68 MMHG | HEIGHT: 72 IN | BODY MASS INDEX: 20.18 KG/M2 | WEIGHT: 149 LBS | HEART RATE: 67 BPM | SYSTOLIC BLOOD PRESSURE: 198 MMHG

## 2024-11-12 DIAGNOSIS — I65.23 BILATERAL CAROTID ARTERY STENOSIS: ICD-10-CM

## 2024-11-12 DIAGNOSIS — E78.2 MIXED HYPERLIPIDEMIA: ICD-10-CM

## 2024-11-12 DIAGNOSIS — I73.9 PAD (PERIPHERAL ARTERY DISEASE): ICD-10-CM

## 2024-11-12 DIAGNOSIS — I10 ESSENTIAL HYPERTENSION: ICD-10-CM

## 2024-11-12 DIAGNOSIS — I65.23 BILATERAL CAROTID ARTERY STENOSIS: Primary | ICD-10-CM

## 2024-11-12 PROCEDURE — 93923 UPR/LXTR ART STDY 3+ LVLS: CPT | Performed by: SURGERY

## 2024-11-12 PROCEDURE — 1160F RVW MEDS BY RX/DR IN RCRD: CPT | Performed by: SURGERY

## 2024-11-12 PROCEDURE — 93880 EXTRACRANIAL BILAT STUDY: CPT | Performed by: SURGERY

## 2024-11-12 PROCEDURE — 99214 OFFICE O/P EST MOD 30 MIN: CPT | Performed by: SURGERY

## 2024-11-12 PROCEDURE — 93923 UPR/LXTR ART STDY 3+ LVLS: CPT

## 2024-11-12 PROCEDURE — 3078F DIAST BP <80 MM HG: CPT | Performed by: SURGERY

## 2024-11-12 PROCEDURE — 1159F MED LIST DOCD IN RCRD: CPT | Performed by: SURGERY

## 2024-11-12 PROCEDURE — 93880 EXTRACRANIAL BILAT STUDY: CPT

## 2024-11-12 PROCEDURE — 3077F SYST BP >= 140 MM HG: CPT | Performed by: SURGERY

## 2024-11-12 RX ORDER — CALCIUM CARBONATE/VITAMIN D3 500-10/5ML
1 LIQUID (ML) ORAL DAILY
COMMUNITY

## 2024-11-12 NOTE — LETTER
November 12, 2024     Nathan Zimmer MD  [address] [place] [address]    Patient: Ricardo Hugo   YOB: 1950   Date of Visit: 11/12/2024     Dear Nathan Zimmer MD:       Thank you for referring Ricardo Hugo to me for evaluation. Below are the relevant portions of my assessment and plan of care.    If you have questions, please do not hesitate to call me. I look forward to following Ricardo along with you.         Sincerely,        Gil Pineda DO        CC: No Recipients    Gil Pineda DO  11/12/24 1426  Sign when Signing Visit  11/12/2024       Nathan Zimmer MD  [address] [place] [address]    Ricardo Hugo  1950    Chief Complaint   Patient presents with   • Follow-up     1 year follow up w/ testing. Last seen 11/7/23. Patient denies any stroke types symptoms.        Dear Nathan Zimmer MD         I had the pleasure of seeing your patient Ricardo Hugo in the office today.  As you recall, Ricardo Hugo is a 74 y.o.  male who we are following for lower extremity PAD and carotid occlusive disease.   He was found to have significant carotid disease and underwent a right carotid endarterectomy on 5/10/2021.  Currently he is doing well and denies any strokelike symptoms.  He does have claudication to his lower extremities.  He follows with nephrology and most recent GFR 17.  Due to this we have not recommended any intervention to his lower extremities.  He has maintained on aspirin, Plavix, and Lipitor.  He did have noninvasive testing performed which I did review in office.    Review of Systems   Constitutional: Negative.    HENT: Negative.     Eyes: Negative.    Respiratory: Negative.     Cardiovascular: Negative.         Claudication to bilateral lower extremities   Gastrointestinal: Negative.    Endocrine: Negative.    Genitourinary: Negative.    Musculoskeletal:  Positive for gait problem.   Skin: Negative.   "  Allergic/Immunologic: Negative.    Hematological: Negative.    Psychiatric/Behavioral: Negative.     All other systems reviewed and are negative.       BP (!) 198/68   Pulse 67   Ht 182.9 cm (72\")   Wt 67.6 kg (149 lb)   SpO2 98%   BMI 20.21 kg/m²    Physical Exam  Vitals and nursing note reviewed.   Constitutional:       Appearance: Normal appearance. He is well-developed and normal weight.   HENT:      Head: Normocephalic and atraumatic.   Eyes:      General: No scleral icterus.     Pupils: Pupils are equal, round, and reactive to light.   Neck:      Thyroid: No thyromegaly.   Cardiovascular:      Rate and Rhythm: Normal rate and regular rhythm.      Pulses:           Femoral pulses are 2+ on the right side and 2+ on the left side.       Dorsalis pedis pulses are detected w/ Doppler on the right side and detected w/ Doppler on the left side.        Posterior tibial pulses are detected w/ Doppler on the right side and detected w/ Doppler on the left side.      Heart sounds: Normal heart sounds.   Pulmonary:      Effort: Pulmonary effort is normal.      Breath sounds: Normal breath sounds.   Abdominal:      General: Bowel sounds are normal.      Palpations: Abdomen is soft.   Musculoskeletal:         General: Normal range of motion.      Cervical back: Normal range of motion and neck supple.   Skin:     General: Skin is warm and dry.   Neurological:      Mental Status: He is alert and oriented to person, place, and time.   Psychiatric:         Mood and Affect: Mood normal.         Behavior: Behavior normal.         Thought Content: Thought content normal.         Judgment: Judgment normal.           Diagnostic data:  Noninvasive testing including ABIs show moderate arterial insufficiency to bilateral lower extremities.  Carotid duplex shows less than 50% right carotid stenosis and 50 to 69% left carotid stenosis with bilateral antegrade vertebral flow.  I       Patient Active Problem List   Diagnosis   • " Chronic rhinitis   • Resistant hypertension   • Chronic diarrhea   • Symptomatic anemia   • Wellness examination   • PAD (peripheral artery disease)   • IHD (ischemic heart disease)   • Carotid stenosis   • Stenosis of right carotid artery   • Carotid stenosis, right   • Diastolic dysfunction   • CRD (chronic renal disease), stage IV   • Anemia due to chronic kidney disease   • Copper deficiency   • Low iron    • CLL (chronic lymphocytic leukemia)   • Perforation of left tympanic membrane   • Mixed hyperlipidemia   • Systolic murmur   • Eustachian tube dysfunction, bilateral   • Sensorineural hearing loss (SNHL), bilateral   • Anticoagulated   • Nasal septal deviation   • Hypertrophy of inferior nasal turbinate   • Unilateral recurrent inguinal hernia without obstruction or gangrene   • Bilateral inguinal hernia without obstruction or gangrene   • Sleep difficulties   • Dermatitis associated with moisture   • Proteinuria         ICD-10-CM ICD-9-CM   1. Bilateral carotid artery stenosis  I65.23 433.10     433.30   2. PAD (peripheral artery disease)  I73.9 443.9   3. Mixed hyperlipidemia  E78.2 272.2   4. Essential hypertension  I10 401.9           Plan: After thoroughly evaluating Ricardo Hugo, I believe the best course of action is to remain conservative from a vascular surgery standpoint.  I did review his testing which shows moderate arterial insufficiency to his lower extremities.  His carotid duplex shows less than 50% right carotid stenosis and 50 to 69% left carotid stenosis.  He does have claudication to his lower extremities however his GFR is 17.  At this point we have recommended he continue to walk is much as possible to build up collateral circulation as he is not on dialysis nor has this been discussed at this time he states.  I will see him back in 1 years time with repeat noninvasive testing including ABIs and a carotid duplex for continued surveillance.  I did discuss vascular risk factors as  they pertain to the progression of vascular disease including controlling his hypertension and hyperlipidemia.  His blood pressure is elevated in office at 198/68.  He is asymptomatic.   He should continue on his aspirin 81 mg daily, Plavix 75 mg daily, and Lipitor 10 mg daily in addition to his other medications.  This was all discussed in full with complete understanding.    Thank you for allowing me to participate in the care of your patient.  Please do not hesitate with any questions or concerns.  I will keep you aware of any further encounters with Ricardo Hugo.        Sincerely yours,         Gil Pineda, DO

## 2024-11-12 NOTE — PROGRESS NOTES
"11/12/2024       Nathan Zimmer MD  [address] [place] [address]    Ricardo Hugo  1950    Chief Complaint   Patient presents with    Follow-up     1 year follow up w/ testing. Last seen 11/7/23. Patient denies any stroke types symptoms.        Dear Nathan Zimmer MD         I had the pleasure of seeing your patient Ricardo Hugo in the office today.  As you recall, Ricardo Hugo is a 74 y.o.  male who we are following for lower extremity PAD and carotid occlusive disease.   He was found to have significant carotid disease and underwent a right carotid endarterectomy on 5/10/2021.  Currently he is doing well and denies any strokelike symptoms.  He does have claudication to his lower extremities.  He follows with nephrology and most recent GFR 17.  Due to this we have not recommended any intervention to his lower extremities.  He has maintained on aspirin, Plavix, and Lipitor.  He did have noninvasive testing performed which I did review in office.    Review of Systems   Constitutional: Negative.    HENT: Negative.     Eyes: Negative.    Respiratory: Negative.     Cardiovascular: Negative.         Claudication to bilateral lower extremities   Gastrointestinal: Negative.    Endocrine: Negative.    Genitourinary: Negative.    Musculoskeletal:  Positive for gait problem.   Skin: Negative.    Allergic/Immunologic: Negative.    Hematological: Negative.    Psychiatric/Behavioral: Negative.     All other systems reviewed and are negative.       BP (!) 198/68   Pulse 67   Ht 182.9 cm (72\")   Wt 67.6 kg (149 lb)   SpO2 98%   BMI 20.21 kg/m²    Physical Exam  Vitals and nursing note reviewed.   Constitutional:       Appearance: Normal appearance. He is well-developed and normal weight.   HENT:      Head: Normocephalic and atraumatic.   Eyes:      General: No scleral icterus.     Pupils: Pupils are equal, round, and reactive to light.   Neck:      Thyroid: No thyromegaly. "   Cardiovascular:      Rate and Rhythm: Normal rate and regular rhythm.      Pulses:           Femoral pulses are 2+ on the right side and 2+ on the left side.       Dorsalis pedis pulses are detected w/ Doppler on the right side and detected w/ Doppler on the left side.        Posterior tibial pulses are detected w/ Doppler on the right side and detected w/ Doppler on the left side.      Heart sounds: Normal heart sounds.   Pulmonary:      Effort: Pulmonary effort is normal.      Breath sounds: Normal breath sounds.   Abdominal:      General: Bowel sounds are normal.      Palpations: Abdomen is soft.   Musculoskeletal:         General: Normal range of motion.      Cervical back: Normal range of motion and neck supple.   Skin:     General: Skin is warm and dry.   Neurological:      Mental Status: He is alert and oriented to person, place, and time.   Psychiatric:         Mood and Affect: Mood normal.         Behavior: Behavior normal.         Thought Content: Thought content normal.         Judgment: Judgment normal.           Diagnostic data:  Noninvasive testing including ABIs show moderate arterial insufficiency to bilateral lower extremities.  Carotid duplex shows less than 50% right carotid stenosis and 50 to 69% left carotid stenosis with bilateral antegrade vertebral flow.  I       Patient Active Problem List   Diagnosis    Chronic rhinitis    Resistant hypertension    Chronic diarrhea    Symptomatic anemia    Wellness examination    PAD (peripheral artery disease)    IHD (ischemic heart disease)    Carotid stenosis    Stenosis of right carotid artery    Carotid stenosis, right    Diastolic dysfunction    CRD (chronic renal disease), stage IV    Anemia due to chronic kidney disease    Copper deficiency    Low iron     CLL (chronic lymphocytic leukemia)    Perforation of left tympanic membrane    Mixed hyperlipidemia    Systolic murmur    Eustachian tube dysfunction, bilateral    Sensorineural hearing loss  (SNHL), bilateral    Anticoagulated    Nasal septal deviation    Hypertrophy of inferior nasal turbinate    Unilateral recurrent inguinal hernia without obstruction or gangrene    Bilateral inguinal hernia without obstruction or gangrene    Sleep difficulties    Dermatitis associated with moisture    Proteinuria         ICD-10-CM ICD-9-CM   1. Bilateral carotid artery stenosis  I65.23 433.10     433.30   2. PAD (peripheral artery disease)  I73.9 443.9   3. Mixed hyperlipidemia  E78.2 272.2   4. Essential hypertension  I10 401.9           Plan: After thoroughly evaluating Ricardo Hugo, I believe the best course of action is to remain conservative from a vascular surgery standpoint.  I did review his testing which shows moderate arterial insufficiency to his lower extremities.  His carotid duplex shows less than 50% right carotid stenosis and 50 to 69% left carotid stenosis.  He does have claudication to his lower extremities however his GFR is 17.  At this point we have recommended he continue to walk is much as possible to build up collateral circulation as he is not on dialysis nor has this been discussed at this time he states.  I will see him back in 1 years time with repeat noninvasive testing including ABIs and a carotid duplex for continued surveillance.  I did discuss vascular risk factors as they pertain to the progression of vascular disease including controlling his hypertension and hyperlipidemia.  His blood pressure is elevated in office at 198/68.  He is asymptomatic.   He should continue on his aspirin 81 mg daily, Plavix 75 mg daily, and Lipitor 10 mg daily in addition to his other medications.  This was all discussed in full with complete understanding.    Thank you for allowing me to participate in the care of your patient.  Please do not hesitate with any questions or concerns.  I will keep you aware of any further encounters with Ricardo PITER [place].        Sincerely yours,         Gil YU  Bicking, DO

## 2024-11-13 ENCOUNTER — INFUSION (OUTPATIENT)
Dept: ONCOLOGY | Facility: HOSPITAL | Age: 74
End: 2024-11-13
Payer: MEDICARE

## 2024-11-13 ENCOUNTER — LAB (OUTPATIENT)
Dept: LAB | Facility: HOSPITAL | Age: 74
End: 2024-11-13
Payer: MEDICARE

## 2024-11-13 VITALS
DIASTOLIC BLOOD PRESSURE: 40 MMHG | BODY MASS INDEX: 19.91 KG/M2 | RESPIRATION RATE: 18 BRPM | WEIGHT: 147 LBS | TEMPERATURE: 97.4 F | OXYGEN SATURATION: 92 % | HEART RATE: 54 BPM | HEIGHT: 72 IN | SYSTOLIC BLOOD PRESSURE: 129 MMHG

## 2024-11-13 DIAGNOSIS — N18.4 ANEMIA DUE TO STAGE 4 CHRONIC KIDNEY DISEASE: Primary | ICD-10-CM

## 2024-11-13 DIAGNOSIS — D63.1 ANEMIA DUE TO STAGE 4 CHRONIC KIDNEY DISEASE: ICD-10-CM

## 2024-11-13 DIAGNOSIS — D63.1 ANEMIA DUE TO STAGE 4 CHRONIC KIDNEY DISEASE: Primary | ICD-10-CM

## 2024-11-13 DIAGNOSIS — N18.4 ANEMIA DUE TO STAGE 4 CHRONIC KIDNEY DISEASE: ICD-10-CM

## 2024-11-13 LAB
ALBUMIN SERPL-MCNC: 3.6 G/DL (ref 3.5–5.2)
ALBUMIN/GLOB SERPL: 1.4 G/DL
ALP SERPL-CCNC: 149 U/L (ref 39–117)
ALT SERPL W P-5'-P-CCNC: 7 U/L (ref 1–41)
ANION GAP SERPL CALCULATED.3IONS-SCNC: 14 MMOL/L (ref 5–15)
AST SERPL-CCNC: 13 U/L (ref 1–40)
BASOPHILS # BLD AUTO: 0.05 10*3/MM3 (ref 0–0.2)
BASOPHILS NFR BLD AUTO: 1 % (ref 0–1.5)
BILIRUB SERPL-MCNC: 0.5 MG/DL (ref 0–1.2)
BUN SERPL-MCNC: 63 MG/DL (ref 8–23)
BUN/CREAT SERPL: 18.1 (ref 7–25)
CALCIUM SPEC-SCNC: 8.7 MG/DL (ref 8.6–10.5)
CHLORIDE SERPL-SCNC: 106 MMOL/L (ref 98–107)
CO2 SERPL-SCNC: 19 MMOL/L (ref 22–29)
CREAT SERPL-MCNC: 3.49 MG/DL (ref 0.76–1.27)
DEPRECATED RDW RBC AUTO: 55.8 FL (ref 37–54)
EGFRCR SERPLBLD CKD-EPI 2021: 17.4 ML/MIN/1.73
EOSINOPHIL # BLD AUTO: 0.18 10*3/MM3 (ref 0–0.4)
EOSINOPHIL NFR BLD AUTO: 3.5 % (ref 0.3–6.2)
ERYTHROCYTE [DISTWIDTH] IN BLOOD BY AUTOMATED COUNT: 15 % (ref 12.3–15.4)
GLOBULIN UR ELPH-MCNC: 2.6 GM/DL
GLUCOSE SERPL-MCNC: 152 MG/DL (ref 65–99)
HCT VFR BLD AUTO: 30.6 % (ref 37.5–51)
HGB BLD-MCNC: 9.5 G/DL (ref 13–17.7)
IMM GRANULOCYTES # BLD AUTO: 0.03 10*3/MM3 (ref 0–0.05)
IMM GRANULOCYTES NFR BLD AUTO: 0.6 % (ref 0–0.5)
LYMPHOCYTES # BLD AUTO: 0.88 10*3/MM3 (ref 0.7–3.1)
LYMPHOCYTES NFR BLD AUTO: 17.3 % (ref 19.6–45.3)
MCH RBC QN AUTO: 31.6 PG (ref 26.6–33)
MCHC RBC AUTO-ENTMCNC: 31 G/DL (ref 31.5–35.7)
MCV RBC AUTO: 101.7 FL (ref 79–97)
MONOCYTES # BLD AUTO: 0.56 10*3/MM3 (ref 0.1–0.9)
MONOCYTES NFR BLD AUTO: 11 % (ref 5–12)
NEUTROPHILS NFR BLD AUTO: 3.4 10*3/MM3 (ref 1.7–7)
NEUTROPHILS NFR BLD AUTO: 66.6 % (ref 42.7–76)
NRBC BLD AUTO-RTO: 0 /100 WBC (ref 0–0.2)
PLATELET # BLD AUTO: 107 10*3/MM3 (ref 140–450)
PMV BLD AUTO: 9.4 FL (ref 6–12)
POTASSIUM SERPL-SCNC: 4 MMOL/L (ref 3.5–5.2)
PROT SERPL-MCNC: 6.2 G/DL (ref 6–8.5)
RBC # BLD AUTO: 3.01 10*6/MM3 (ref 4.14–5.8)
SODIUM SERPL-SCNC: 139 MMOL/L (ref 136–145)
WBC NRBC COR # BLD AUTO: 5.1 10*3/MM3 (ref 3.4–10.8)

## 2024-11-13 PROCEDURE — 80053 COMPREHEN METABOLIC PANEL: CPT

## 2024-11-13 PROCEDURE — 96372 THER/PROPH/DIAG INJ SC/IM: CPT

## 2024-11-13 PROCEDURE — 25010000002 EPOETIN ALFA-EPBX 40000 UNIT/ML SOLUTION: Performed by: NURSE PRACTITIONER

## 2024-11-13 PROCEDURE — 36415 COLL VENOUS BLD VENIPUNCTURE: CPT

## 2024-11-13 PROCEDURE — 85025 COMPLETE CBC W/AUTO DIFF WBC: CPT

## 2024-11-13 RX ADMIN — EPOETIN ALFA-EPBX 40000 UNITS: 40000 INJECTION, SOLUTION INTRAVENOUS; SUBCUTANEOUS at 08:59

## 2024-11-19 ENCOUNTER — OFFICE VISIT (OUTPATIENT)
Dept: INTERNAL MEDICINE | Facility: CLINIC | Age: 74
End: 2024-11-19
Payer: MEDICARE

## 2024-11-19 VITALS
HEIGHT: 72 IN | TEMPERATURE: 98.4 F | HEART RATE: 56 BPM | SYSTOLIC BLOOD PRESSURE: 136 MMHG | BODY MASS INDEX: 19.91 KG/M2 | OXYGEN SATURATION: 97 % | WEIGHT: 147 LBS | DIASTOLIC BLOOD PRESSURE: 60 MMHG

## 2024-11-19 DIAGNOSIS — I1A.0 RESISTANT HYPERTENSION: Primary | ICD-10-CM

## 2024-11-19 DIAGNOSIS — Z79.899 ENCOUNTER FOR LONG-TERM CURRENT USE OF MEDICATION: ICD-10-CM

## 2024-11-19 DIAGNOSIS — E55.9 VITAMIN D DEFICIENCY: ICD-10-CM

## 2024-11-19 DIAGNOSIS — E78.2 MIXED HYPERLIPIDEMIA: ICD-10-CM

## 2024-11-19 PROCEDURE — G2211 COMPLEX E/M VISIT ADD ON: HCPCS | Performed by: INTERNAL MEDICINE

## 2024-11-19 PROCEDURE — 1126F AMNT PAIN NOTED NONE PRSNT: CPT | Performed by: INTERNAL MEDICINE

## 2024-11-19 PROCEDURE — 99214 OFFICE O/P EST MOD 30 MIN: CPT | Performed by: INTERNAL MEDICINE

## 2024-11-19 PROCEDURE — 3075F SYST BP GE 130 - 139MM HG: CPT | Performed by: INTERNAL MEDICINE

## 2024-11-19 PROCEDURE — 3078F DIAST BP <80 MM HG: CPT | Performed by: INTERNAL MEDICINE

## 2024-11-19 RX ORDER — CIPROFLOXACIN AND DEXAMETHASONE 3; 1 MG/ML; MG/ML
4 SUSPENSION/ DROPS AURICULAR (OTIC) 2 TIMES DAILY
Qty: 7.5 ML | Refills: 1 | Status: SHIPPED | OUTPATIENT
Start: 2024-11-19

## 2024-11-19 RX ORDER — DOCUSATE SODIUM 100 MG/1
100 CAPSULE, LIQUID FILLED ORAL 3 TIMES DAILY
COMMUNITY

## 2024-11-19 NOTE — PROGRESS NOTES
"      Chief Complaint  Hypertension (3 month follow up )    Subjective        Ricardo Hugo presents to [facility]    HPI    Patient here for the above problems.  See Assessment and Plan for further HPI components.      Review of Systems    Objective   Vital Signs:  /60 (BP Location: Left arm, Patient Position: Sitting, Cuff Size: Adult)   Pulse 56   Temp 98.4 °F (36.9 °C) (Temporal)   Ht 182.9 cm (72\")   Wt 66.7 kg (147 lb)   SpO2 97%   BMI 19.94 kg/m²   Estimated body mass index is 19.94 kg/m² as calculated from the following:    Height as of this encounter: 182.9 cm (72\").    Weight as of this encounter: 66.7 kg (147 lb).      Physical Exam  Vitals and nursing note reviewed.   Constitutional:       Appearance: He is not ill-appearing.   Eyes:      General: No scleral icterus.     Conjunctiva/sclera: Conjunctivae normal.   Pulmonary:      Effort: Pulmonary effort is normal. No respiratory distress.   Neurological:      General: No focal deficit present.      Mental Status: He is alert and oriented to person, place, and time.   Psychiatric:         Mood and Affect: Mood normal.         Behavior: Behavior normal.                       Assessment and Plan   Diagnoses and all orders for this visit:    1. Resistant hypertension (Primary)  -     CBC & Differential; Future  -     Comprehensive Metabolic Panel; Future  -     Urinalysis With Microscopic - Urine, Clean Catch; Future    2. Mixed hyperlipidemia  -     Lipid Panel; Future  -     TSH Rfx On Abnormal To Free T4; Future    3. Encounter for long-term current use of medication  -     CBC & Differential; Future  -     Comprehensive Metabolic Panel; Future  -     Urinalysis With Microscopic - Urine, Clean Catch; Future  -     Lipid Panel; Future  -     TSH Rfx On Abnormal To Free T4; Future    4. Vitamin D deficiency  -     Vitamin D,25-Hydroxy; Future    Other orders  -     ciprofloxacin-dexAMETHasone (Ciprodex) 0.3-0.1 " % otic suspension; Administer 4 drops to the right ear 2 (Two) Times a Day.  Dispense: 7.5 mL; Refill: 1        History of Present Illness  The patient is a 74-year-old gentleman with past medical history of chronic kidney disease and hypertension. He is on a complex blood pressure regimen and is very compliant with his medications.    He reports that his blood pressure tends to be elevated in the mornings, but normalizes after medication. His nephrologist has not made any recent changes to his treatment plan. He denies experiencing any diarrhea.    IMMUNIZATIONS  He has received influenza vaccine this year.      Assessment & Plan  1. Hypertension.  His blood pressure readings have been satisfactory both in the clinic and at home, although he reports higher readings in the mornings before taking his medication. Continuation of the current nifedipine 90 mg regimen is recommended. He is compliant with his complex blood pressure regimen, including valsartan and nifedipine. Very happy with patients blood pressures recently.    2. Chronic Kidney Disease.  His recent labs show stable creatinine at 3.49 and GFR at 17.4, consistent with previous values of 17.4 and 17.6. No changes have been made by his nephrologist, and his condition appears stable.    3. Hyperlipidemia  Labs as above.  Atorvastatin 10 mg.    Reviewed recent labs from nephrology and hematology.  Iron stable.  Kidney function stable.  Hemoglobin still a little lower than desired.      Result Review :  The following data was reviewed by: Nathan Zimmer MD on 11/19/2024:  CMP          10/2/2024    08:49 10/30/2024    08:41 11/13/2024    08:35   CMP   Glucose 103  127  152    BUN 76  62  63    Creatinine 3.61  3.45  3.49    EGFR 16.7  17.6  17.4    Sodium 140  140  139    Potassium 4.7  4.1  4.0    Chloride 106  105  106    Calcium 8.6  8.6  8.7    Total Protein 6.3  6.2  6.2    Albumin 3.8  3.6  3.6    Globulin 2.5  2.6  2.6    Total Bilirubin 0.3  0.4   0.5    Alkaline Phosphatase 197  157  149    AST (SGOT) 12  8  13    ALT (SGPT) 8  6  7    Albumin/Globulin Ratio 1.5  1.4  1.4    BUN/Creatinine Ratio 21.1  18.0  18.1    Anion Gap 14.0  16.0  14.0      CBC w/diff          10/16/2024    08:44 10/30/2024    08:41 11/13/2024    08:35   CBC w/Diff   WBC 4.38  5.30  5.10    RBC 2.81  2.93  3.01    Hemoglobin 9.0  9.2  9.5    Hematocrit 28.9  30.3  30.6    .8  103.4  101.7    MCH 32.0  31.4  31.6    MCHC 31.1  30.4  31.0    RDW 15.5  15.1  15.0    Platelets 96  98  107    Neutrophil Rel % 59.8  66.1  66.6    Immature Granulocyte Rel % 0.5  0.8  0.6    Lymphocyte Rel % 21.9  16.8  17.3    Monocyte Rel % 11.6  10.9  11.0    Eosinophil Rel % 5.3  4.5  3.5    Basophil Rel % 0.9  0.9  1.0       Latest Reference Range & Units 10/30/24 08:41   Iron 59 - 158 mcg/dL 67   Ferritin 30.00 - 400.00 ng/mL 199.00   Iron Saturation (TSAT) 20 - 50 % 25   Transferrin 200 - 360 mg/dL 183 (L)   TIBC 298 - 536 mcg/dL 273 (L)   Vitamin B-12 211 - 946 pg/mL 361   Folate 4.78 - 24.20 ng/mL 10.30   (L): Data is abnormally low         BMI is within normal parameters. No other follow-up for BMI required.      BMI is within normal parameters. No other follow-up for BMI required.       Current Outpatient Medications:     albuterol sulfate  (90 Base) MCG/ACT inhaler, USE 2 INHALATIONS FOUR TIMES A DAY AS NEEDED FOR SHORTNESS OF AIR OR WHEEZING, Disp: 17 g, Rfl: 6    ALPRAZolam (XANAX) 0.25 MG tablet, TAKE 1 TABLET BY MOUTH EVERY NIGHT, Disp: 90 tablet, Rfl: 1    aspirin (aspirin) 81 MG EC tablet, Take  by mouth Daily., Disp: , Rfl:     atorvastatin (LIPITOR) 10 MG tablet, Take 1 tablet by mouth Daily., Disp: 90 tablet, Rfl: 3    azelastine (ASTELIN) 0.1 % nasal spray, USE 1 SPRAY IN EACH NOSTRIL TWICE A DAY AS NEEDED, Disp: 90 mL, Rfl: 1    carvedilol (COREG) 25 MG tablet, Take 1 tablet by mouth 2 (Two) Times a Day With Meals., Disp: 180 tablet, Rfl: 3    cholestyramine (QUESTRAN) 4 g  packet, MIX AND DRINK 1 PACKET DAILY, Disp: 90 packet, Rfl: 0    ciprofloxacin-dexAMETHasone (Ciprodex) 0.3-0.1 % otic suspension, Administer 4 drops to the right ear 2 (Two) Times a Day., Disp: 7.5 mL, Rfl: 1    cloNIDine (CATAPRES) 0.3 MG tablet, Take 1 tablet by mouth 3 (Three) Times a Day., Disp: 270 tablet, Rfl: 2    clopidogrel (PLAVIX) 75 MG tablet, TAKE 1 TABLET DAILY, Disp: 90 tablet, Rfl: 3    Copper Gluconate (Copper Caps) 2 MG capsule, Take 2 mg by mouth Daily., Disp: , Rfl:     diphenhydrAMINE HCl, Sleep, (ZzzQuil) 25 MG capsule, Take  by mouth Every Night., Disp: , Rfl:     docusate sodium (COLACE) 100 MG capsule, Take 1 capsule by mouth 3 times a day., Disp: , Rfl:     fexofenadine (ALLEGRA) 180 MG tablet, Take 1 tablet by mouth Daily., Disp: , Rfl:     fluticasone (FLONASE) 50 MCG/ACT nasal spray, USE 2 SPRAYS INTO THE  NOSTRIL(S) AS DIRECTED BY PROVIDER DAILY AS NEEDED FOR RHINITIS, Disp: 48 g, Rfl: 3    furosemide (Lasix) 20 MG tablet, Take 1 tablet by mouth 3 (Three) Times a Day., Disp: 270 tablet, Rfl: 3    hydrALAZINE (APRESOLINE) 100 MG tablet, Take 1 tablet by mouth 3 (Three) Times a Day. 100mg daily, Disp: , Rfl:     levothyroxine (Synthroid) 75 MCG tablet, Take 1 tablet by mouth Daily., Disp: 90 tablet, Rfl: 3    magnesium chloride ER 64 MG DR tablet, Take 143 mg by mouth Daily., Disp: , Rfl:     Melatonin 10 MG capsule, Take 3 capsules by mouth Every Night., Disp: , Rfl:     mesalamine (APRISO) 0.375 g 24 hr capsule, TAKE 4 CAPSULES DAILY, Disp: 360 capsule, Rfl: 3    montelukast (SINGULAIR) 10 MG tablet, TAKE 1 TABLET EVERY NIGHT, Disp: 90 tablet, Rfl: 3    Multiple Vitamins-Minerals (PRESERVISION/LUTEIN) capsule, Take 1 capsule by mouth 2 (two) times a day., Disp: , Rfl:     NIFEdipine CC (ADALAT CC) 90 MG 24 hr tablet, Take 1 tablet by mouth Daily., Disp: 90 tablet, Rfl: 3    omeprazole (priLOSEC) 20 MG capsule, TAKE 1 CAPSULE DAILY, Disp: 90 capsule, Rfl: 1    Oxymetazoline HCl (Nasal  Spray) 0.05 % solution, , Disp: , Rfl:     phenylephrine (MAYELA-SYNEPHRINE) 1 % nasal spray, 1 spray into the nostril(s) as directed by provider Daily As Needed (ear bleeding)., Disp: 1 each, Rfl: 0    sodium bicarbonate 650 MG tablet, 2 qam, 1 at noon, and 2 qpm, Disp: 450 tablet, Rfl: 3    spironolactone (ALDACTONE) 25 MG tablet, Take 1 tablet by mouth Daily., Disp: 90 tablet, Rfl: 2    valsartan (DIOVAN) 320 MG tablet, Take 1 tablet by mouth Daily., Disp: , Rfl:     vitamin D (ERGOCALCIFEROL) 1.25 MG (79624 UT) capsule capsule, Take 1 capsule by mouth 1 (One) Time Per Week., Disp: 12 capsule, Rfl: 3    Current Facility-Administered Medications:     cyanocobalamin injection 1,000 mcg, 1,000 mcg, Intramuscular, Q28 Days, Fidel, Lyman C, APRN, 1,000 mcg at 08/11/23 1123           Follow Up   Return in about 3 months (around 2/19/2025), or if symptoms worsen or fail to improve, for follow up for above problems. [facility] care., Medicare Wellness - Labs prior to visit.  Patient was given instructions and counseling regarding his condition or for health maintenance advice. Please see specific information pulled into the AVS if appropriate.       MAHENDRA Zimmer MD, FACP, [facility]      Electronically signed by Nathan Zimmer MD, 11/19/24, 12:19 PM CST.    Patient or patient representative verbalized consent for the use of Ambient Listening during the visit with  Nathan Zimmer MD for chart documentation. 11/19/2024  12:44 CST

## 2024-11-27 ENCOUNTER — LAB (OUTPATIENT)
Dept: LAB | Facility: HOSPITAL | Age: 74
End: 2024-11-27
Payer: MEDICARE

## 2024-11-27 ENCOUNTER — INFUSION (OUTPATIENT)
Dept: ONCOLOGY | Facility: HOSPITAL | Age: 74
End: 2024-11-27
Payer: MEDICARE

## 2024-11-27 VITALS
RESPIRATION RATE: 18 BRPM | HEART RATE: 52 BPM | DIASTOLIC BLOOD PRESSURE: 49 MMHG | OXYGEN SATURATION: 94 % | SYSTOLIC BLOOD PRESSURE: 126 MMHG | WEIGHT: 146.8 LBS | TEMPERATURE: 97.6 F | BODY MASS INDEX: 19.88 KG/M2 | HEIGHT: 72 IN

## 2024-11-27 DIAGNOSIS — N18.4 ANEMIA DUE TO STAGE 4 CHRONIC KIDNEY DISEASE: ICD-10-CM

## 2024-11-27 DIAGNOSIS — D63.1 ANEMIA DUE TO STAGE 4 CHRONIC KIDNEY DISEASE: ICD-10-CM

## 2024-11-27 DIAGNOSIS — I51.89 DIASTOLIC DYSFUNCTION: ICD-10-CM

## 2024-11-27 DIAGNOSIS — I1A.0 RESISTANT HYPERTENSION: ICD-10-CM

## 2024-11-27 DIAGNOSIS — N18.4 ANEMIA DUE TO STAGE 4 CHRONIC KIDNEY DISEASE: Primary | ICD-10-CM

## 2024-11-27 DIAGNOSIS — D63.1 ANEMIA DUE TO STAGE 4 CHRONIC KIDNEY DISEASE: Primary | ICD-10-CM

## 2024-11-27 DIAGNOSIS — I25.9 IHD (ISCHEMIC HEART DISEASE): ICD-10-CM

## 2024-11-27 LAB
ALBUMIN SERPL-MCNC: 3.6 G/DL (ref 3.5–5.2)
ALBUMIN/GLOB SERPL: 1.4 G/DL
ALP SERPL-CCNC: 144 U/L (ref 39–117)
ALT SERPL W P-5'-P-CCNC: 8 U/L (ref 1–41)
ANION GAP SERPL CALCULATED.3IONS-SCNC: 16 MMOL/L (ref 5–15)
AST SERPL-CCNC: 13 U/L (ref 1–40)
BASOPHILS # BLD AUTO: 0.04 10*3/MM3 (ref 0–0.2)
BASOPHILS NFR BLD AUTO: 0.6 % (ref 0–1.5)
BILIRUB SERPL-MCNC: 0.5 MG/DL (ref 0–1.2)
BUN SERPL-MCNC: 54 MG/DL (ref 8–23)
BUN/CREAT SERPL: 16.3 (ref 7–25)
CALCIUM SPEC-SCNC: 8.3 MG/DL (ref 8.6–10.5)
CHLORIDE SERPL-SCNC: 102 MMOL/L (ref 98–107)
CO2 SERPL-SCNC: 19 MMOL/L (ref 22–29)
CREAT SERPL-MCNC: 3.32 MG/DL (ref 0.76–1.27)
DEPRECATED RDW RBC AUTO: 57.1 FL (ref 37–54)
EGFRCR SERPLBLD CKD-EPI 2021: 18.5 ML/MIN/1.73
EOSINOPHIL # BLD AUTO: 0.22 10*3/MM3 (ref 0–0.4)
EOSINOPHIL NFR BLD AUTO: 3.3 % (ref 0.3–6.2)
ERYTHROCYTE [DISTWIDTH] IN BLOOD BY AUTOMATED COUNT: 15.2 % (ref 12.3–15.4)
GLOBULIN UR ELPH-MCNC: 2.5 GM/DL
GLUCOSE SERPL-MCNC: 100 MG/DL (ref 65–99)
HCT VFR BLD AUTO: 31.1 % (ref 37.5–51)
HGB BLD-MCNC: 9.5 G/DL (ref 13–17.7)
IMM GRANULOCYTES # BLD AUTO: 0.05 10*3/MM3 (ref 0–0.05)
IMM GRANULOCYTES NFR BLD AUTO: 0.7 % (ref 0–0.5)
LYMPHOCYTES # BLD AUTO: 0.8 10*3/MM3 (ref 0.7–3.1)
LYMPHOCYTES NFR BLD AUTO: 11.9 % (ref 19.6–45.3)
MCH RBC QN AUTO: 30.8 PG (ref 26.6–33)
MCHC RBC AUTO-ENTMCNC: 30.5 G/DL (ref 31.5–35.7)
MCV RBC AUTO: 101 FL (ref 79–97)
MONOCYTES # BLD AUTO: 0.57 10*3/MM3 (ref 0.1–0.9)
MONOCYTES NFR BLD AUTO: 8.5 % (ref 5–12)
NEUTROPHILS NFR BLD AUTO: 5.02 10*3/MM3 (ref 1.7–7)
NEUTROPHILS NFR BLD AUTO: 75 % (ref 42.7–76)
NRBC BLD AUTO-RTO: 0 /100 WBC (ref 0–0.2)
PLATELET # BLD AUTO: 97 10*3/MM3 (ref 140–450)
PMV BLD AUTO: 9.4 FL (ref 6–12)
POTASSIUM SERPL-SCNC: 3.7 MMOL/L (ref 3.5–5.2)
PROT SERPL-MCNC: 6.1 G/DL (ref 6–8.5)
RBC # BLD AUTO: 3.08 10*6/MM3 (ref 4.14–5.8)
SODIUM SERPL-SCNC: 137 MMOL/L (ref 136–145)
WBC NRBC COR # BLD AUTO: 6.7 10*3/MM3 (ref 3.4–10.8)

## 2024-11-27 PROCEDURE — 25010000002 EPOETIN ALFA-EPBX 40000 UNIT/ML SOLUTION: Performed by: NURSE PRACTITIONER

## 2024-11-27 PROCEDURE — 96372 THER/PROPH/DIAG INJ SC/IM: CPT

## 2024-11-27 PROCEDURE — 80053 COMPREHEN METABOLIC PANEL: CPT

## 2024-11-27 PROCEDURE — 85025 COMPLETE CBC W/AUTO DIFF WBC: CPT

## 2024-11-27 PROCEDURE — 36415 COLL VENOUS BLD VENIPUNCTURE: CPT

## 2024-11-27 RX ORDER — NIFEDIPINE 90 MG/1
90 TABLET, FILM COATED, EXTENDED RELEASE ORAL DAILY
Qty: 90 TABLET | Refills: 3 | Status: SHIPPED | OUTPATIENT
Start: 2024-11-27

## 2024-11-27 RX ORDER — CARVEDILOL 25 MG/1
25 TABLET ORAL 2 TIMES DAILY WITH MEALS
Qty: 180 TABLET | Refills: 3 | Status: SHIPPED | OUTPATIENT
Start: 2024-11-27

## 2024-11-27 RX ORDER — MESALAMINE 0.38 G/1
1.5 CAPSULE, EXTENDED RELEASE ORAL DAILY
Qty: 360 CAPSULE | Refills: 0 | Status: SHIPPED | OUTPATIENT
Start: 2024-11-27

## 2024-11-27 RX ADMIN — EPOETIN ALFA-EPBX 40000 UNITS: 40000 INJECTION, SOLUTION INTRAVENOUS; SUBCUTANEOUS at 09:32

## 2024-12-11 ENCOUNTER — INFUSION (OUTPATIENT)
Dept: ONCOLOGY | Facility: HOSPITAL | Age: 74
End: 2024-12-11
Payer: MEDICARE

## 2024-12-11 ENCOUNTER — LAB (OUTPATIENT)
Dept: LAB | Facility: HOSPITAL | Age: 74
End: 2024-12-11
Payer: MEDICARE

## 2024-12-11 ENCOUNTER — TRANSCRIBE ORDERS (OUTPATIENT)
Dept: ADMINISTRATIVE | Facility: HOSPITAL | Age: 74
End: 2024-12-11
Payer: MEDICARE

## 2024-12-11 VITALS
TEMPERATURE: 97.3 F | DIASTOLIC BLOOD PRESSURE: 44 MMHG | HEART RATE: 60 BPM | HEIGHT: 72 IN | WEIGHT: 152 LBS | SYSTOLIC BLOOD PRESSURE: 152 MMHG | BODY MASS INDEX: 20.59 KG/M2 | RESPIRATION RATE: 18 BRPM | OXYGEN SATURATION: 95 %

## 2024-12-11 DIAGNOSIS — N18.4 ANEMIA DUE TO STAGE 4 CHRONIC KIDNEY DISEASE: Primary | ICD-10-CM

## 2024-12-11 DIAGNOSIS — D63.1 ANEMIA DUE TO STAGE 4 CHRONIC KIDNEY DISEASE: ICD-10-CM

## 2024-12-11 DIAGNOSIS — N18.4 ANEMIA DUE TO STAGE 4 CHRONIC KIDNEY DISEASE: ICD-10-CM

## 2024-12-11 DIAGNOSIS — N18.4 CHRONIC KIDNEY DISEASE, STAGE IV (SEVERE): Primary | ICD-10-CM

## 2024-12-11 DIAGNOSIS — N18.4 CHRONIC KIDNEY DISEASE, STAGE IV (SEVERE): ICD-10-CM

## 2024-12-11 DIAGNOSIS — D63.1 ANEMIA DUE TO STAGE 4 CHRONIC KIDNEY DISEASE: Primary | ICD-10-CM

## 2024-12-11 LAB
ALBUMIN SERPL-MCNC: 3.7 G/DL (ref 3.5–5.2)
ALBUMIN/GLOB SERPL: 1.4 G/DL
ALP SERPL-CCNC: 156 U/L (ref 39–117)
ALT SERPL W P-5'-P-CCNC: 10 U/L (ref 1–41)
ANION GAP SERPL CALCULATED.3IONS-SCNC: 13 MMOL/L (ref 5–15)
AST SERPL-CCNC: 17 U/L (ref 1–40)
BASOPHILS # BLD AUTO: 0.06 10*3/MM3 (ref 0–0.2)
BASOPHILS NFR BLD AUTO: 1 % (ref 0–1.5)
BILIRUB SERPL-MCNC: 0.6 MG/DL (ref 0–1.2)
BUN SERPL-MCNC: 52 MG/DL (ref 8–23)
BUN/CREAT SERPL: 15.7 (ref 7–25)
CALCIUM SPEC-SCNC: 8.7 MG/DL (ref 8.6–10.5)
CHLORIDE SERPL-SCNC: 103 MMOL/L (ref 98–107)
CO2 SERPL-SCNC: 21 MMOL/L (ref 22–29)
CREAT SERPL-MCNC: 3.31 MG/DL (ref 0.76–1.27)
DEPRECATED RDW RBC AUTO: 58.2 FL (ref 37–54)
EGFRCR SERPLBLD CKD-EPI 2021: 18.5 ML/MIN/1.73
EOSINOPHIL # BLD AUTO: 0.24 10*3/MM3 (ref 0–0.4)
EOSINOPHIL NFR BLD AUTO: 4 % (ref 0.3–6.2)
ERYTHROCYTE [DISTWIDTH] IN BLOOD BY AUTOMATED COUNT: 15.9 % (ref 12.3–15.4)
GLOBULIN UR ELPH-MCNC: 2.6 GM/DL
GLUCOSE SERPL-MCNC: 128 MG/DL (ref 65–99)
HCT VFR BLD AUTO: 29.9 % (ref 37.5–51)
HGB BLD-MCNC: 9.5 G/DL (ref 13–17.7)
IMM GRANULOCYTES # BLD AUTO: 0.04 10*3/MM3 (ref 0–0.05)
IMM GRANULOCYTES NFR BLD AUTO: 0.7 % (ref 0–0.5)
LYMPHOCYTES # BLD AUTO: 1.17 10*3/MM3 (ref 0.7–3.1)
LYMPHOCYTES NFR BLD AUTO: 19.5 % (ref 19.6–45.3)
MAGNESIUM SERPL-MCNC: 1.8 MG/DL (ref 1.6–2.4)
MCH RBC QN AUTO: 31.8 PG (ref 26.6–33)
MCHC RBC AUTO-ENTMCNC: 31.8 G/DL (ref 31.5–35.7)
MCV RBC AUTO: 100 FL (ref 79–97)
MONOCYTES # BLD AUTO: 0.57 10*3/MM3 (ref 0.1–0.9)
MONOCYTES NFR BLD AUTO: 9.5 % (ref 5–12)
NEUTROPHILS NFR BLD AUTO: 3.91 10*3/MM3 (ref 1.7–7)
NEUTROPHILS NFR BLD AUTO: 65.3 % (ref 42.7–76)
NRBC BLD AUTO-RTO: 0 /100 WBC (ref 0–0.2)
PHOSPHATE SERPL-MCNC: 5 MG/DL (ref 2.5–4.5)
PLATELET # BLD AUTO: 128 10*3/MM3 (ref 140–450)
PMV BLD AUTO: 9.9 FL (ref 6–12)
POTASSIUM SERPL-SCNC: 3.6 MMOL/L (ref 3.5–5.2)
PROT SERPL-MCNC: 6.3 G/DL (ref 6–8.5)
RBC # BLD AUTO: 2.99 10*6/MM3 (ref 4.14–5.8)
SODIUM SERPL-SCNC: 137 MMOL/L (ref 136–145)
URATE SERPL-MCNC: 9 MG/DL (ref 3.4–7)
WBC NRBC COR # BLD AUTO: 5.99 10*3/MM3 (ref 3.4–10.8)

## 2024-12-11 PROCEDURE — 84100 ASSAY OF PHOSPHORUS: CPT

## 2024-12-11 PROCEDURE — 84550 ASSAY OF BLOOD/URIC ACID: CPT

## 2024-12-11 PROCEDURE — 80053 COMPREHEN METABOLIC PANEL: CPT

## 2024-12-11 PROCEDURE — 83735 ASSAY OF MAGNESIUM: CPT

## 2024-12-11 PROCEDURE — 85025 COMPLETE CBC W/AUTO DIFF WBC: CPT

## 2024-12-11 PROCEDURE — 36415 COLL VENOUS BLD VENIPUNCTURE: CPT

## 2024-12-11 PROCEDURE — 96372 THER/PROPH/DIAG INJ SC/IM: CPT

## 2024-12-11 PROCEDURE — 25010000002 EPOETIN ALFA-EPBX 40000 UNIT/ML SOLUTION: Performed by: NURSE PRACTITIONER

## 2024-12-11 RX ADMIN — EPOETIN ALFA-EPBX 40000 UNITS: 40000 INJECTION, SOLUTION INTRAVENOUS; SUBCUTANEOUS at 09:49

## 2024-12-26 ENCOUNTER — INFUSION (OUTPATIENT)
Dept: ONCOLOGY | Facility: HOSPITAL | Age: 74
End: 2024-12-26
Payer: MEDICARE

## 2024-12-26 ENCOUNTER — HOSPITAL ENCOUNTER (OUTPATIENT)
Dept: GENERAL RADIOLOGY | Facility: HOSPITAL | Age: 74
Discharge: HOME | End: 2024-12-26
Payer: MEDICARE

## 2024-12-26 ENCOUNTER — LAB (OUTPATIENT)
Dept: LAB | Facility: HOSPITAL | Age: 74
End: 2024-12-26
Payer: MEDICARE

## 2024-12-26 ENCOUNTER — OFFICE VISIT (OUTPATIENT)
Dept: INTERNAL MEDICINE | Facility: CLINIC | Age: 74
End: 2024-12-26
Payer: MEDICARE

## 2024-12-26 VITALS
HEIGHT: 72 IN | WEIGHT: 154 LBS | HEART RATE: 54 BPM | BODY MASS INDEX: 20.86 KG/M2 | DIASTOLIC BLOOD PRESSURE: 45 MMHG | RESPIRATION RATE: 18 BRPM | SYSTOLIC BLOOD PRESSURE: 148 MMHG | TEMPERATURE: 97.4 F | OXYGEN SATURATION: 93 %

## 2024-12-26 VITALS
OXYGEN SATURATION: 93 % | BODY MASS INDEX: 19.91 KG/M2 | HEIGHT: 72 IN | TEMPERATURE: 98.2 F | SYSTOLIC BLOOD PRESSURE: 132 MMHG | DIASTOLIC BLOOD PRESSURE: 56 MMHG | HEART RATE: 53 BPM | WEIGHT: 147 LBS

## 2024-12-26 DIAGNOSIS — R39.14 BENIGN PROSTATIC HYPERPLASIA WITH INCOMPLETE BLADDER EMPTYING: ICD-10-CM

## 2024-12-26 DIAGNOSIS — D63.1 ANEMIA DUE TO STAGE 4 CHRONIC KIDNEY DISEASE: ICD-10-CM

## 2024-12-26 DIAGNOSIS — D63.1 ANEMIA DUE TO STAGE 4 CHRONIC KIDNEY DISEASE: Primary | ICD-10-CM

## 2024-12-26 DIAGNOSIS — J18.9 PNEUMONIA OF RIGHT LOWER LOBE DUE TO INFECTIOUS ORGANISM: Primary | ICD-10-CM

## 2024-12-26 DIAGNOSIS — N40.1 BENIGN PROSTATIC HYPERPLASIA WITH INCOMPLETE BLADDER EMPTYING: ICD-10-CM

## 2024-12-26 DIAGNOSIS — I51.89 DIASTOLIC DYSFUNCTION: ICD-10-CM

## 2024-12-26 DIAGNOSIS — R06.02 SOB (SHORTNESS OF BREATH) ON EXERTION: ICD-10-CM

## 2024-12-26 DIAGNOSIS — J98.4 RESTRICTIVE AIRWAY DISEASE: ICD-10-CM

## 2024-12-26 DIAGNOSIS — N18.4 ANEMIA DUE TO STAGE 4 CHRONIC KIDNEY DISEASE: ICD-10-CM

## 2024-12-26 DIAGNOSIS — N18.4 CRD (CHRONIC RENAL DISEASE), STAGE IV: ICD-10-CM

## 2024-12-26 DIAGNOSIS — N18.4 ANEMIA DUE TO STAGE 4 CHRONIC KIDNEY DISEASE: Primary | ICD-10-CM

## 2024-12-26 LAB
ALBUMIN SERPL-MCNC: 3.9 G/DL (ref 3.5–5.2)
ALBUMIN/GLOB SERPL: 1.6 G/DL
ALP SERPL-CCNC: 143 U/L (ref 39–117)
ALT SERPL W P-5'-P-CCNC: 8 U/L (ref 1–41)
ANION GAP SERPL CALCULATED.3IONS-SCNC: 16 MMOL/L (ref 5–15)
AST SERPL-CCNC: 15 U/L (ref 1–40)
BASOPHILS # BLD AUTO: 0.05 10*3/MM3 (ref 0–0.2)
BASOPHILS NFR BLD AUTO: 0.9 % (ref 0–1.5)
BILIRUB SERPL-MCNC: 0.5 MG/DL (ref 0–1.2)
BUN SERPL-MCNC: 51 MG/DL (ref 8–23)
BUN/CREAT SERPL: 15.7 (ref 7–25)
CALCIUM SPEC-SCNC: 8.6 MG/DL (ref 8.6–10.5)
CHLORIDE SERPL-SCNC: 105 MMOL/L (ref 98–107)
CO2 SERPL-SCNC: 20 MMOL/L (ref 22–29)
CREAT SERPL-MCNC: 3.24 MG/DL (ref 0.76–1.27)
DEPRECATED RDW RBC AUTO: 61.3 FL (ref 37–54)
EGFRCR SERPLBLD CKD-EPI 2021: 19 ML/MIN/1.73
EOSINOPHIL # BLD AUTO: 0.26 10*3/MM3 (ref 0–0.4)
EOSINOPHIL NFR BLD AUTO: 4.8 % (ref 0.3–6.2)
ERYTHROCYTE [DISTWIDTH] IN BLOOD BY AUTOMATED COUNT: 16.4 % (ref 12.3–15.4)
GLOBULIN UR ELPH-MCNC: 2.5 GM/DL
GLUCOSE SERPL-MCNC: 125 MG/DL (ref 65–99)
HCT VFR BLD AUTO: 30.8 % (ref 37.5–51)
HGB BLD-MCNC: 9.8 G/DL (ref 13–17.7)
IMM GRANULOCYTES # BLD AUTO: 0.03 10*3/MM3 (ref 0–0.05)
IMM GRANULOCYTES NFR BLD AUTO: 0.6 % (ref 0–0.5)
LYMPHOCYTES # BLD AUTO: 1.04 10*3/MM3 (ref 0.7–3.1)
LYMPHOCYTES NFR BLD AUTO: 19.4 % (ref 19.6–45.3)
MCH RBC QN AUTO: 32.5 PG (ref 26.6–33)
MCHC RBC AUTO-ENTMCNC: 31.8 G/DL (ref 31.5–35.7)
MCV RBC AUTO: 102 FL (ref 79–97)
MONOCYTES # BLD AUTO: 0.58 10*3/MM3 (ref 0.1–0.9)
MONOCYTES NFR BLD AUTO: 10.8 % (ref 5–12)
NEUTROPHILS NFR BLD AUTO: 3.41 10*3/MM3 (ref 1.7–7)
NEUTROPHILS NFR BLD AUTO: 63.5 % (ref 42.7–76)
NRBC BLD AUTO-RTO: 0 /100 WBC (ref 0–0.2)
PLATELET # BLD AUTO: 114 10*3/MM3 (ref 140–450)
PMV BLD AUTO: 9.9 FL (ref 6–12)
POTASSIUM SERPL-SCNC: 3.7 MMOL/L (ref 3.5–5.2)
PROT SERPL-MCNC: 6.4 G/DL (ref 6–8.5)
RBC # BLD AUTO: 3.02 10*6/MM3 (ref 4.14–5.8)
SODIUM SERPL-SCNC: 141 MMOL/L (ref 136–145)
WBC NRBC COR # BLD AUTO: 5.37 10*3/MM3 (ref 3.4–10.8)

## 2024-12-26 PROCEDURE — 99214 OFFICE O/P EST MOD 30 MIN: CPT

## 2024-12-26 PROCEDURE — 3078F DIAST BP <80 MM HG: CPT

## 2024-12-26 PROCEDURE — 71046 X-RAY EXAM CHEST 2 VIEWS: CPT

## 2024-12-26 PROCEDURE — 1159F MED LIST DOCD IN RCRD: CPT

## 2024-12-26 PROCEDURE — 36415 COLL VENOUS BLD VENIPUNCTURE: CPT

## 2024-12-26 PROCEDURE — 1126F AMNT PAIN NOTED NONE PRSNT: CPT

## 2024-12-26 PROCEDURE — 25010000002 EPOETIN ALFA-EPBX 40000 UNIT/ML SOLUTION: Performed by: NURSE PRACTITIONER

## 2024-12-26 PROCEDURE — 1160F RVW MEDS BY RX/DR IN RCRD: CPT

## 2024-12-26 PROCEDURE — 3075F SYST BP GE 130 - 139MM HG: CPT

## 2024-12-26 PROCEDURE — 80053 COMPREHEN METABOLIC PANEL: CPT

## 2024-12-26 PROCEDURE — 96372 THER/PROPH/DIAG INJ SC/IM: CPT

## 2024-12-26 PROCEDURE — 85025 COMPLETE CBC W/AUTO DIFF WBC: CPT

## 2024-12-26 RX ORDER — GUAIFENESIN 600 MG/1
600 TABLET, EXTENDED RELEASE ORAL 2 TIMES DAILY
Qty: 14 TABLET | Refills: 0 | Status: SHIPPED | OUTPATIENT
Start: 2024-12-26 | End: 2025-01-02

## 2024-12-26 RX ORDER — TAMSULOSIN HYDROCHLORIDE 0.4 MG/1
1 CAPSULE ORAL NIGHTLY
Qty: 90 CAPSULE | OUTPATIENT
Start: 2024-12-26

## 2024-12-26 RX ORDER — AZITHROMYCIN 250 MG/1
TABLET, FILM COATED ORAL
Qty: 6 TABLET | Refills: 0 | Status: SHIPPED | OUTPATIENT
Start: 2024-12-26

## 2024-12-26 RX ORDER — TAMSULOSIN HYDROCHLORIDE 0.4 MG/1
1 CAPSULE ORAL NIGHTLY
Qty: 30 CAPSULE | Refills: 0 | Status: SHIPPED | OUTPATIENT
Start: 2024-12-26

## 2024-12-26 RX ORDER — AMOXICILLIN AND CLAVULANATE POTASSIUM 500; 125 MG/1; MG/1
1 TABLET, FILM COATED ORAL 2 TIMES DAILY
Qty: 10 TABLET | Refills: 0 | Status: SHIPPED | OUTPATIENT
Start: 2024-12-26 | End: 2024-12-31

## 2024-12-26 RX ORDER — BUDESONIDE, GLYCOPYRROLATE, AND FORMOTEROL FUMARATE 160; 9; 4.8 UG/1; UG/1; UG/1
2 AEROSOL, METERED RESPIRATORY (INHALATION) 2 TIMES DAILY
Qty: 1 EACH | Refills: 0 | COMMUNITY
Start: 2024-12-26

## 2024-12-26 RX ADMIN — EPOETIN ALFA-EPBX 40000 UNITS: 40000 INJECTION, SOLUTION INTRAVENOUS; SUBCUTANEOUS at 08:59

## 2024-12-26 NOTE — PROGRESS NOTES
Subjective   Ricardo Hugo is a 74 y.o. male.   Chief Complaint   Patient presents with    Discuss Prostate     Patient states he is having trouble urinating.     Shortness of Breath     Patient c/o SOB x 1 year.   States within the last 2 weeks sx have worsened - states walking throughout the house sx occur.  Denies chest pains.        History of Present Illness   History of Present Illness  The patient is a 74-year-old male who presents to the office today wanting to discuss prostate issues as well as feeling more short of breath over the last couple of weeks.    He reports experiencing dyspnea, which has been a chronic issue but has worsened over the past few weeks. He does not experience any chest pain or leg swelling. He reports difficulty breathing when lying supine and no cough, chest tightness, or congestion. His respiratory distress is primarily exertional, such as during ambulation. He does not experience any sensation of chest heaviness. He has not consulted with Dr. Omalley, a pulmonologist who has seen in the past. He does not experience any fever, chills, night sweats, or difficulty with slow deep inspiration. He also does not report any sensation of breathing through a straw during expiration. He occasionally experiences palpitations, a symptom that is not new to him. He sometimes experiences tachycardia during episodes of dyspnea but does not report any associated dizziness or lightheadedness. He reports no changes in his lifestyle, diet, or hydration status. His bowel movements are regular, and he does not experience any abdominal distension or signs of fluid retention. He finds relief from his symptoms with the use of an albuterol inhaler, which he used twice this morning and 3 to 4 times yesterday.    He reports experiencing urinary hesitancy and difficulty initiating urination, often requiring straining. He also experiences post-void dribbling and nocturia. He has been experiencing these  symptoms for approximately 3 to 4 months. He has not previously been on any medication for these symptoms. He reports that his most bothersome symptom in recent days has been urinary retention. He describes his urine as normal in appearance and does not feel that his bladder is currently full. He has been informed by his nephrologist that his uric acid levels are elevated and was prescribed allopurinol, but he forgot to take it.    MEDICATIONS  Plavix, Xanax, albuterol inhaler.       The following portions of the patient's history were reviewed and updated as appropriate: allergies, current medications, past family history, past medical history, past social history, past surgical history and problem list.    Review of Systems    Objective   Past Medical History:   Diagnosis Date    3-vessel CAD 8/11/2020    Allergic rhinitis     Anxiety disorder 4/27/2020    Arthritis     Asymmetrical sensorineural hearing loss 6/28/2017    Atherosclerosis of native artery of both lower extremities with intermittent claudication 7/18/2019    Avascular necrosis of femoral head, left 07/11/2020    right hip after surgery    Carotid stenosis     Chronic mucoid otitis media     Chronic rhinitis     Coronary artery disease     HEART BYPASS 2004    Crohn's disease of large intestine with other complication 7/30/2020    Chronic diarrhea Colonoscopy July 2020 revealed mild patchy scattered hemosiderin staining with inflammation more so in rectosigmoid area.  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    Iron deficiency anemia 7/14/2020    Mixed hearing loss of left ear     PAD (peripheral artery disease)  8/11/2020    Perianal abscess     Pernicious anemia 08/17/2020    took shots but never diagnosed with b12 deficiency    Personal history of alcoholism 08/11/2020    quit drinking in 2013    Prostatic hypertrophy 8/11/2020    Sensorineural hearing loss     Sepsis with acute renal failure 9/15/2020    Shortness of breath 5/27/2021    Tinnitus     Ventricular tachycardia, nonsustained 7/14/2020    Weight loss 7/11/2020      Past Surgical History:   Procedure Laterality Date    ARTERY SURGERY  2021    right side on neck    CAROTID ENDARTERECTOMY Right 5/10/2021    Procedure: RIGHT CAROTID ENDARTERECTOMY WITH EEG;  Surgeon: Gil Pineda DO;  Location: [facility] OR ;  Service: Vascular;  Laterality: Right;    COLONOSCOPY N/A 7/2/2020    Procedure: COLONOSCOPY WITH ANESTHESIA;  Surgeon: Adrien Brewster MD;  Location: [facility] ENDOSCOPY;  Service: Gastroenterology;  Laterality: N/A;  pre op: diarrhea  post op: polyps  PCP: Joe Velasco MD    COLONOSCOPY N/A 10/13/2020    Procedure: COLONOSCOPY WITH ANESTHESIA;  Surgeon: Adrien Brewster MD;  Location: [facility] ENDOSCOPY;  Service: Gastroenterology;  Laterality: N/A;  Pre: Chronic Diarrhea, Crohn's  Post: AVM  Dr. Neftali Velasco  CO2 Inflation Used    COLONOSCOPY N/A 12/8/2023    Procedure: COLONOSCOPY WITH ANESTHESIA;  Surgeon: Adrien Brewster MD;  Location: [facility] ENDOSCOPY;  Service: Gastroenterology;  Laterality: N/A;  pre chrone's disease  post sub optimal prep, polyp, chrone's      CORONARY ARTERY BYPASS GRAFT  2003    x3    ENDOSCOPY N/A 11/2/2021    Procedure: ESOPHAGOGASTRODUODENOSCOPY WITH ANESTHESIA;  Surgeon: Bridger Bell MD;  Location: [facility] ENDOSCOPY;  Service: Gastroenterology;  Laterality: N/A;  pre anemia;gi bleed  post  gi bleed;schatski ring  Dr. ERIC Velasco    ENDOSCOPY N/A 10/10/2023    Procedure: ESOPHAGOGASTRODUODENOSCOPY WITH ANESTHESIA;  Surgeon: Adrien Brewster MD;  Location: [facility] ENDOSCOPY;  Service:  Gastroenterology;  Laterality: N/A;  preop; anemia  postop esophagitis ; R/O barretts   PCP Randall Beata    EYE SURGERY Bilateral     catorac    INCISION AND DRAINAGE PERIRECTAL ABSCESS N/A 3/3/2017    Procedure: INCISION AND DRAINAGE OF JEET ANAL ABSCESS;  Surgeon: Lynette Smith MD;  Location:  [facility] OR;  Service:     INGUINAL HERNIA REPAIR Bilateral 6/27/2023    Procedure: INGUINAL HERNIA BILATERAL REPAIR LAPAROSCOPIC WITH DAVINCI ROBOT WITH MESH;  Surgeon: Tahira Rivera MD;  Location:  [facility] OR;  Service: Robotics - DaVinci;  Laterality: Bilateral;    MYRINGOTOMY W/ TUBES Left 04/17/2017    06/10/2016    TONSILLECTOMY      TOTAL HIP ARTHROPLASTY Right 2006        Current Outpatient Medications:     albuterol sulfate  (90 Base) MCG/ACT inhaler, USE 2 INHALATIONS FOUR TIMES A DAY AS NEEDED FOR SHORTNESS OF AIR OR WHEEZING, Disp: 17 g, Rfl: 6    ALPRAZolam (XANAX) 0.25 MG tablet, TAKE 1 TABLET BY MOUTH EVERY NIGHT, Disp: 90 tablet, Rfl: 1    aspirin (aspirin) 81 MG EC tablet, Take  by mouth Daily., Disp: , Rfl:     atorvastatin (LIPITOR) 10 MG tablet, Take 1 tablet by mouth Daily., Disp: 90 tablet, Rfl: 3    azelastine (ASTELIN) 0.1 % nasal spray, USE 1 SPRAY IN EACH NOSTRIL TWICE A DAY AS NEEDED, Disp: 90 mL, Rfl: 1    carvedilol (COREG) 25 MG tablet, Take 1 tablet by mouth 2 (Two) Times a Day With Meals., Disp: 180 tablet, Rfl: 3    cholestyramine (QUESTRAN) 4 g packet, MIX AND DRINK 1 PACKET DAILY, Disp: 90 packet, Rfl: 0    ciprofloxacin-dexAMETHasone (Ciprodex) 0.3-0.1 % otic suspension, Administer 4 drops to the right ear 2 (Two) Times a Day., Disp: 7.5 mL, Rfl: 1    cloNIDine (CATAPRES) 0.3 MG tablet, Take 1 tablet by mouth 3 (Three) Times a Day., Disp: 270 tablet, Rfl: 2    clopidogrel (PLAVIX) 75 MG tablet, TAKE 1 TABLET DAILY, Disp: 90 tablet, Rfl: 3    Copper Gluconate (Copper Caps) 2 MG capsule, Take 2 mg by mouth Daily., Disp: , Rfl:     diphenhydrAMINE HCl, Sleep, (ZzzQuil) 25 MG  capsule, Take  by mouth Every Night., Disp: , Rfl:     docusate sodium (COLACE) 100 MG capsule, Take 1 capsule by mouth 3 times a day., Disp: , Rfl:     fexofenadine (ALLEGRA) 180 MG tablet, Take 1 tablet by mouth Daily., Disp: , Rfl:     fluticasone (FLONASE) 50 MCG/ACT nasal spray, USE 2 SPRAYS INTO THE  NOSTRIL(S) AS DIRECTED BY PROVIDER DAILY AS NEEDED FOR RHINITIS, Disp: 48 g, Rfl: 3    furosemide (Lasix) 20 MG tablet, Take 1 tablet by mouth 3 (Three) Times a Day., Disp: 270 tablet, Rfl: 3    hydrALAZINE (APRESOLINE) 100 MG tablet, Take 1 tablet by mouth 3 (Three) Times a Day. 100mg daily, Disp: , Rfl:     levothyroxine (Synthroid) 75 MCG tablet, Take 1 tablet by mouth Daily., Disp: 90 tablet, Rfl: 3    magnesium chloride ER 64 MG DR tablet, Take 143 mg by mouth Daily., Disp: , Rfl:     Melatonin 10 MG capsule, Take 3 capsules by mouth Every Night., Disp: , Rfl:     mesalamine (APRISO) 0.375 g 24 hr capsule, TAKE 4 CAPSULES DAILY, Disp: 360 capsule, Rfl: 0    montelukast (SINGULAIR) 10 MG tablet, TAKE 1 TABLET EVERY NIGHT, Disp: 90 tablet, Rfl: 3    Multiple Vitamins-Minerals (PRESERVISION/LUTEIN) capsule, Take 1 capsule by mouth 2 (two) times a day., Disp: , Rfl:     NIFEdipine CC (ADALAT CC) 90 MG 24 hr tablet, TAKE 1 TABLET DAILY, Disp: 90 tablet, Rfl: 3    omeprazole (priLOSEC) 20 MG capsule, TAKE 1 CAPSULE DAILY, Disp: 90 capsule, Rfl: 1    Oxymetazoline HCl (Nasal Spray) 0.05 % solution, , Disp: , Rfl:     phenylephrine (MAYELA-SYNEPHRINE) 1 % nasal spray, 1 spray into the nostril(s) as directed by provider Daily As Needed (ear bleeding)., Disp: 1 each, Rfl: 0    sodium bicarbonate 650 MG tablet, 2 qam, 1 at noon, and 2 qpm, Disp: 450 tablet, Rfl: 3    spironolactone (ALDACTONE) 25 MG tablet, Take 1 tablet by mouth Daily., Disp: 90 tablet, Rfl: 2    valsartan (DIOVAN) 320 MG tablet, Take 1 tablet by mouth Daily., Disp: , Rfl:     vitamin D (ERGOCALCIFEROL) 1.25 MG (47336 UT) capsule capsule, Take 1 capsule  "by mouth 1 (One) Time Per Week., Disp: 12 capsule, Rfl: 3    amoxicillin-clavulanate (AUGMENTIN) 500-125 MG per tablet, Take 1 tablet by mouth 2 (Two) Times a Day for 5 days., Disp: 10 tablet, Rfl: 0    azithromycin (Zithromax Z-[place]) 250 MG tablet, Take 2 tablets the first day, then 1 tablet daily for 4 days., Disp: 6 tablet, Rfl: 0    Budeson-Glycopyrrol-Formoterol (Breztri Aerosphere) 160-9-4.8 MCG/ACT aerosol inhaler, Inhale 2 puffs 2 (Two) Times a Day., Disp: 1 each, Rfl: 0    guaiFENesin (Mucinex) 600 MG 12 hr tablet, Take 1 tablet by mouth 2 (Two) Times a Day for 7 days., Disp: 14 tablet, Rfl: 0    tamsulosin (FLOMAX) 0.4 MG capsule 24 hr capsule, Take 1 capsule by mouth Every Night., Disp: 30 capsule, Rfl: 0    Current Facility-Administered Medications:     cyanocobalamin injection 1,000 mcg, 1,000 mcg, Intramuscular, Q28 Days, Fidel, Andrew C, APRN, 1,000 mcg at 08/11/23 1123      /56 (BP Location: Left arm, Patient Position: Sitting, Cuff Size: Adult)   Pulse 53   Temp 98.2 °F (36.8 °C) (Infrared)   Ht 182.9 cm (72\")   Wt 66.7 kg (147 lb)   SpO2 93%   BMI 19.94 kg/m²      Body mass index is 19.94 kg/m².  BMI is within normal parameters. No other follow-up for BMI required.       Physical Exam  Vitals and nursing note reviewed.   Constitutional:       General: He is not in acute distress.     Appearance: He is ill-appearing. He is not toxic-appearing or diaphoretic.   HENT:      Head: Normocephalic and atraumatic.   Eyes:      Extraocular Movements: Extraocular movements intact.      Conjunctiva/sclera: Conjunctivae normal.      Pupils: Pupils are equal, round, and reactive to light.   Cardiovascular:      Rate and Rhythm: Regular rhythm. Bradycardia present.      Pulses: Normal pulses.      Heart sounds: Murmur heard.   Pulmonary:      Effort: Pulmonary effort is normal.      Breath sounds: Rales (slight in left mid chest, diminished RLL) present.   Abdominal:      General: Bowel sounds are " normal.      Palpations: Abdomen is soft.   Musculoskeletal:         General: Normal range of motion.      Cervical back: Normal range of motion and neck supple.      Right lower leg: No edema.      Left lower leg: No edema.   Skin:     General: Skin is warm and dry.   Neurological:      General: No focal deficit present.      Mental Status: He is alert and oriented to person, place, and time. Mental status is at baseline.      Motor: Weakness present.      Gait: Gait normal.   Psychiatric:         Mood and Affect: Mood normal.         Behavior: Behavior normal.         Thought Content: Thought content normal.         Judgment: Judgment normal.               Assessment & Plan   Diagnoses and all orders for this visit:    1. Pneumonia of right lower lobe due to infectious organism (Primary)  -     amoxicillin-clavulanate (AUGMENTIN) 500-125 MG per tablet; Take 1 tablet by mouth 2 (Two) Times a Day for 5 days.  Dispense: 10 tablet; Refill: 0  -     guaiFENesin (Mucinex) 600 MG 12 hr tablet; Take 1 tablet by mouth 2 (Two) Times a Day for 7 days.  Dispense: 14 tablet; Refill: 0  -     azithromycin (Zithromax Z-[place]) 250 MG tablet; Take 2 tablets the first day, then 1 tablet daily for 4 days.  Dispense: 6 tablet; Refill: 0    2. SOB (shortness of breath) on exertion  -     XR Chest PA & Lateral; Future    3. Restrictive airway disease  -     XR Chest PA & Lateral; Future  -     Budeson-Glycopyrrol-Formoterol (Breztri Aerosphere) 160-9-4.8 MCG/ACT aerosol inhaler; Inhale 2 puffs 2 (Two) Times a Day.  Dispense: 1 each; Refill: 0    4. Diastolic dysfunction  -     XR Chest PA & Lateral; Future    5. Benign prostatic hyperplasia with incomplete bladder emptying  -     tamsulosin (FLOMAX) 0.4 MG capsule 24 hr capsule; Take 1 capsule by mouth Every Night.  Dispense: 30 capsule; Refill: 0  -     US Bladder Volume; Future  -     Urinalysis With Microscopic - Urine, Clean Catch; Future    6. CRD (chronic renal disease), stage  IV                 Assessment & Plan  1. Dyspnea.  His dyspnea may be attributed to a potential exacerbation of his known mild mitral valve regurgitation and mild-to-moderate tricuspid valve regurgitation, as evidenced by his last echocardiogram in October 2022. The possibility of walking pneumonia can not be ruled out at this stage. His oxygen saturation is currently at 93 percent, which is slightly below the normal range. However, his absolute white blood cell counts are within normal limits, providing some reassurance against an infectious etiology. A chest x-ray will be ordered today to exclude the presence of pulmonary edema or infection. A maintenance inhaler (Breztri sample in office) will be provided for use twice daily (pulmonary function testing completed in June 2021 did reveal evidence of moderate obstruction and reduced mid flows suggesting small airway/airflow obstruction), and he is advised to continue using his rescue inhaler as needed. He is instructed to rinse his mouth thoroughly after each use of the maintenance inhaler to prevent oral candidiasis. He is also advised to report any changes in his condition, particularly if symptoms worsen or if he develops fever or chills. Should the chest x-ray reveal no significant changes compared to the previous one, a reevaluation of his cardiac status will be considered, along with the potential addition of a maintenance inhaler.    *Chest x-ray ultimately does show evidence concerning for pneumonia in the right lower lobe, I proceeded with sending renally dosed/appropriate antibiotics in for treatment, he will keep his 1 week follow-up, he will continue to try to utilize maintenance inhaler to see if he sees added benefit.  Emphasis placed on good pulmonary toileting.      2. Benign prostatic hyperplasia.  He exhibits classic symptoms of BPH, including urinary hesitancy, straining, incomplete emptying, and nocturia. His PSA level was within the normal range  during his last screening in February 2024, making him eligible for rescreening in February 2025. A prescription for Flomax 0.4 mg will be provided, to be taken nightly. He is advised to allow himself to sit for 30 seconds before standing up from a lying position to prevent orthostatic hypotension and subsequent falls. A 30-day supply of Flomax will be provided, and his response to the medication will be assessed during his follow-up visit in a week. A bladder ultrasound will be ordered to assess for post-void residual volume, and a urinalysis will be conducted to rule out a urinary tract infection.    Follow-up  The patient is scheduled for a follow-up visit in 1 week.    Patient or patient representative verbalized consent for the use of Ambient Listening during the visit with  STERLING Lui for chart documentation. 12/26/2024  13:02 CST    Please note that portions of this note were completed with a voice recognition program.     Electronically signed by STERLING Lui, 12/26/24, 16:38 CST.

## 2024-12-26 NOTE — PROGRESS NOTES
Please call him and inform him that the chest x-ray does show signs concerning for right lower lobe pneumonia, I have sent the treatment for this to his pharmacy, please advise him that I would still like for him to try the maintenance inhaler, I still anticipate keeping 1 week follow-up with myself, call before then if anything seems to be getting worse.

## 2025-01-01 ENCOUNTER — APPOINTMENT (OUTPATIENT)
Dept: GENERAL RADIOLOGY | Facility: HOSPITAL | Age: 75
End: 2025-01-01
Payer: MEDICARE

## 2025-01-01 ENCOUNTER — HOSPITAL ENCOUNTER (EMERGENCY)
Facility: HOSPITAL | Age: 75
Discharge: NURSING, REHAB OR LONG-TERM CARE | End: 2025-08-11
Attending: EMERGENCY MEDICINE | Admitting: EMERGENCY MEDICINE
Payer: MEDICARE

## 2025-01-01 VITALS
HEART RATE: 75 BPM | BODY MASS INDEX: 13.25 KG/M2 | TEMPERATURE: 97 F | HEIGHT: 72 IN | SYSTOLIC BLOOD PRESSURE: 190 MMHG | DIASTOLIC BLOOD PRESSURE: 75 MMHG | RESPIRATION RATE: 23 BRPM | WEIGHT: 97.8 LBS | OXYGEN SATURATION: 95 %

## 2025-01-01 DIAGNOSIS — J90 PLEURAL EFFUSION: ICD-10-CM

## 2025-01-01 DIAGNOSIS — J81.1 CHRONIC PULMONARY EDEMA: Primary | ICD-10-CM

## 2025-01-01 DIAGNOSIS — N18.6 END STAGE RENAL DISEASE: ICD-10-CM

## 2025-01-01 LAB
ANION GAP SERPL CALCULATED.3IONS-SCNC: 14 MMOL/L (ref 5–15)
BASOPHILS # BLD AUTO: 0.08 10*3/MM3 (ref 0–0.2)
BASOPHILS NFR BLD AUTO: 1 % (ref 0–1.5)
BUN SERPL-MCNC: 64.1 MG/DL (ref 8–23)
BUN/CREAT SERPL: 11.8 (ref 7–25)
CALCIUM SPEC-SCNC: 10.4 MG/DL (ref 8.6–10.5)
CHLORIDE SERPL-SCNC: 96 MMOL/L (ref 98–107)
CO2 SERPL-SCNC: 23 MMOL/L (ref 22–29)
CREAT SERPL-MCNC: 5.44 MG/DL (ref 0.76–1.27)
DEPRECATED RDW RBC AUTO: 62.6 FL (ref 37–54)
EGFRCR SERPLBLD CKD-EPI 2021: 10.2 ML/MIN/1.73
EOSINOPHIL # BLD AUTO: 0.22 10*3/MM3 (ref 0–0.4)
EOSINOPHIL NFR BLD AUTO: 2.9 % (ref 0.3–6.2)
ERYTHROCYTE [DISTWIDTH] IN BLOOD BY AUTOMATED COUNT: 17.1 % (ref 12.3–15.4)
GLUCOSE SERPL-MCNC: 101 MG/DL (ref 65–99)
HCT VFR BLD AUTO: 31 % (ref 37.5–51)
HGB BLD-MCNC: 9.7 G/DL (ref 13–17.7)
IMM GRANULOCYTES # BLD AUTO: 0.25 10*3/MM3 (ref 0–0.05)
IMM GRANULOCYTES NFR BLD AUTO: 3.3 % (ref 0–0.5)
LYMPHOCYTES # BLD AUTO: 0.99 10*3/MM3 (ref 0.7–3.1)
LYMPHOCYTES NFR BLD AUTO: 12.9 % (ref 19.6–45.3)
MCH RBC QN AUTO: 31.7 PG (ref 26.6–33)
MCHC RBC AUTO-ENTMCNC: 31.3 G/DL (ref 31.5–35.7)
MCV RBC AUTO: 101.3 FL (ref 79–97)
MONOCYTES # BLD AUTO: 0.68 10*3/MM3 (ref 0.1–0.9)
MONOCYTES NFR BLD AUTO: 8.9 % (ref 5–12)
NEUTROPHILS NFR BLD AUTO: 5.46 10*3/MM3 (ref 1.7–7)
NEUTROPHILS NFR BLD AUTO: 71 % (ref 42.7–76)
NRBC BLD AUTO-RTO: 0 /100 WBC (ref 0–0.2)
NT-PROBNP SERPL-MCNC: ABNORMAL PG/ML (ref 0–1800)
PLATELET # BLD AUTO: 201 10*3/MM3 (ref 140–450)
PMV BLD AUTO: 10.3 FL (ref 6–12)
POTASSIUM SERPL-SCNC: 5.6 MMOL/L (ref 3.5–5.2)
QT INTERVAL: 410 MS
QTC INTERVAL: 455 MS
RBC # BLD AUTO: 3.06 10*6/MM3 (ref 4.14–5.8)
SODIUM SERPL-SCNC: 133 MMOL/L (ref 136–145)
WBC NRBC COR # BLD AUTO: 7.68 10*3/MM3 (ref 3.4–10.8)

## 2025-01-01 PROCEDURE — 71045 X-RAY EXAM CHEST 1 VIEW: CPT

## 2025-01-01 PROCEDURE — 93005 ELECTROCARDIOGRAM TRACING: CPT | Performed by: EMERGENCY MEDICINE

## 2025-01-01 PROCEDURE — 83880 ASSAY OF NATRIURETIC PEPTIDE: CPT | Performed by: EMERGENCY MEDICINE

## 2025-01-01 PROCEDURE — 80048 BASIC METABOLIC PNL TOTAL CA: CPT | Performed by: EMERGENCY MEDICINE

## 2025-01-01 PROCEDURE — 99284 EMERGENCY DEPT VISIT MOD MDM: CPT | Performed by: EMERGENCY MEDICINE

## 2025-01-01 PROCEDURE — 85025 COMPLETE CBC W/AUTO DIFF WBC: CPT | Performed by: EMERGENCY MEDICINE

## 2025-01-03 ENCOUNTER — OFFICE VISIT (OUTPATIENT)
Dept: INTERNAL MEDICINE | Facility: CLINIC | Age: 75
End: 2025-01-03
Payer: MEDICARE

## 2025-01-03 VITALS
DIASTOLIC BLOOD PRESSURE: 48 MMHG | WEIGHT: 150.4 LBS | SYSTOLIC BLOOD PRESSURE: 126 MMHG | OXYGEN SATURATION: 94 % | BODY MASS INDEX: 20.37 KG/M2 | HEIGHT: 72 IN | TEMPERATURE: 98 F | HEART RATE: 51 BPM

## 2025-01-03 DIAGNOSIS — N18.4 CRD (CHRONIC RENAL DISEASE), STAGE IV: ICD-10-CM

## 2025-01-03 DIAGNOSIS — N40.1 BENIGN PROSTATIC HYPERPLASIA WITH INCOMPLETE BLADDER EMPTYING: ICD-10-CM

## 2025-01-03 DIAGNOSIS — R39.14 BENIGN PROSTATIC HYPERPLASIA WITH INCOMPLETE BLADDER EMPTYING: ICD-10-CM

## 2025-01-03 DIAGNOSIS — J98.4 RESTRICTIVE AIRWAY DISEASE: ICD-10-CM

## 2025-01-03 DIAGNOSIS — J18.9 PNEUMONIA OF RIGHT LOWER LOBE DUE TO INFECTIOUS ORGANISM: Primary | ICD-10-CM

## 2025-01-03 PROCEDURE — 99214 OFFICE O/P EST MOD 30 MIN: CPT

## 2025-01-03 PROCEDURE — 1159F MED LIST DOCD IN RCRD: CPT

## 2025-01-03 PROCEDURE — 3074F SYST BP LT 130 MM HG: CPT

## 2025-01-03 PROCEDURE — 1126F AMNT PAIN NOTED NONE PRSNT: CPT

## 2025-01-03 PROCEDURE — 1160F RVW MEDS BY RX/DR IN RCRD: CPT

## 2025-01-03 PROCEDURE — 3078F DIAST BP <80 MM HG: CPT

## 2025-01-03 RX ORDER — TAMSULOSIN HYDROCHLORIDE 0.4 MG/1
1 CAPSULE ORAL NIGHTLY
Qty: 90 CAPSULE | Refills: 3 | Status: SHIPPED | OUTPATIENT
Start: 2025-01-03

## 2025-01-03 RX ORDER — BUDESONIDE, GLYCOPYRROLATE, AND FORMOTEROL FUMARATE 160; 9; 4.8 UG/1; UG/1; UG/1
2 AEROSOL, METERED RESPIRATORY (INHALATION) 2 TIMES DAILY
Qty: 1 EACH | Refills: 3 | Status: SHIPPED | OUTPATIENT
Start: 2025-01-03

## 2025-01-03 NOTE — PROGRESS NOTES
Subjective   Ricardo Hugo is a 74 y.o. male.   Chief Complaint   Patient presents with    Follow-up     1 week f/u;  Patient states he is feeling much better since last visit.   States he has no issues.        History of Present Illness   History of Present Illness  The patient is a 74-year-old male who is in the office today for a 1-week follow-up after being diagnosed with right lower lobe pneumonia in the setting of likely restrictive airway disease. He was prescribed azithromycin, Augmentin, and Mucinex. He had also been using his albuterol inhaler quite frequently, so a sample of a maintenance inhaler from the office was provided. Additionally, he was complaining of BPH symptoms, so Flomax was initiated. This will be reevaluated today as well.    He reports significant improvement in his condition, with no current complaints or issues. He has experienced relief from his symptoms, attributing this to the use of the prescribed inhaler. He requests a prescription for this inhaler to be sent to [facility]. He reports no oral issues related to the inhaler and confirms that he has been rinsing his mouth post-inhalation. He continues to experience mild coughing.    He also reports that the Flomax has been effective in managing his BPH symptoms, with no adverse side effects such as dizziness.    MEDICATIONS  Current: Azithromycin, Augmentin, Mucinex, albuterol inhaler, Flomax       The following portions of the patient's history were reviewed and updated as appropriate: allergies, current medications, past family history, past medical history, past social history, past surgical history and problem list.    Review of Systems    Objective   Past Medical History:   Diagnosis Date    3-vessel CAD 8/11/2020    Allergic rhinitis     Anxiety disorder 4/27/2020    Arthritis     Asymmetrical sensorineural hearing loss 6/28/2017    Atherosclerosis of native artery of both lower extremities with intermittent claudication  7/18/2019    Avascular necrosis of femoral head, left 07/11/2020    right hip after surgery    Carotid stenosis     Chronic mucoid otitis media     Chronic rhinitis     Coronary artery disease     HEART BYPASS 2004    Crohn's disease of large intestine with other complication 7/30/2020    Chronic diarrhea Colonoscopy July 2020 revealed mild patchy scattered hemosiderin staining with inflammation more so in rectosigmoid area.  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    Iron deficiency anemia 7/14/2020    Mixed hearing loss of left ear     PAD (peripheral artery disease) 8/11/2020    Perianal abscess     Pernicious anemia 08/17/2020    took shots but never diagnosed with b12 deficiency    Personal history of alcoholism 08/11/2020    quit drinking in 2013    Prostatic hypertrophy 8/11/2020    Sensorineural hearing loss     Sepsis with acute renal failure 9/15/2020    Shortness of breath 5/27/2021    Tinnitus     Ventricular tachycardia, nonsustained 7/14/2020    Weight loss 7/11/2020      Past Surgical History:   Procedure Laterality Date    ARTERY SURGERY  2021    right side on neck    CAROTID ENDARTERECTOMY Right 5/10/2021    Procedure: RIGHT CAROTID ENDARTERECTOMY WITH EEG;  Surgeon: Gil Pineda DO;  Location: [facility] OR ;  Service: Vascular;  Laterality: Right;    COLONOSCOPY N/A 7/2/2020    Procedure: COLONOSCOPY WITH ANESTHESIA;  Surgeon: Adrien Brewsetr MD;  Location: [facility] ENDOSCOPY;  Service: Gastroenterology;  Laterality: N/A;  pre op: diarrhea  post op: polyps  PCP: Joe Velasco MD    COLONOSCOPY N/A 10/13/2020    Procedure: COLONOSCOPY WITH  ANESTHESIA;  Surgeon: Adrien Brewster MD;  Location: [facility] ENDOSCOPY;  Service: Gastroenterology;  Laterality: N/A;  Pre: Chronic Diarrhea, Crohn's  Post: AVM  Dr. Neftali Velasco  CO2 Inflation Used    COLONOSCOPY N/A 12/8/2023    Procedure: COLONOSCOPY WITH ANESTHESIA;  Surgeon: Adrien Brewster MD;  Location: [facility] ENDOSCOPY;  Service: Gastroenterology;  Laterality: N/A;  pre chrone's disease  post sub optimal prep, polyp, chrone's      CORONARY ARTERY BYPASS GRAFT  2003    x3    ENDOSCOPY N/A 11/2/2021    Procedure: ESOPHAGOGASTRODUODENOSCOPY WITH ANESTHESIA;  Surgeon: Bridger Bell MD;  Location: [facility] ENDOSCOPY;  Service: Gastroenterology;  Laterality: N/A;  pre anemia;gi bleed  post  gi bleed;schatski ring  Dr. ERIC Velasco    ENDOSCOPY N/A 10/10/2023    Procedure: ESOPHAGOGASTRODUODENOSCOPY WITH ANESTHESIA;  Surgeon: Adrien Brewster MD;  Location: [facility] ENDOSCOPY;  Service: Gastroenterology;  Laterality: N/A;  preop; anemia  postop esophagitis ; R/O barretts   PCP Randall Beata    EYE SURGERY Bilateral     catorac    INCISION AND DRAINAGE PERIRECTAL ABSCESS N/A 3/3/2017    Procedure: INCISION AND DRAINAGE OF JEET ANAL ABSCESS;  Surgeon: Lynette Smith MD;  Location: [facility] OR;  Service:     INGUINAL HERNIA REPAIR Bilateral 6/27/2023    Procedure: INGUINAL HERNIA BILATERAL REPAIR LAPAROSCOPIC WITH DAVINCI ROBOT WITH MESH;  Surgeon: Tahira Rivera MD;  Location: [facility] OR;  Service: Robotics - DaVinci;  Laterality: Bilateral;    MYRINGOTOMY W/ TUBES Left 04/17/2017    06/10/2016    TONSILLECTOMY      TOTAL HIP ARTHROPLASTY Right 2006        Current Outpatient Medications:     albuterol sulfate  (90 Base) MCG/ACT inhaler, USE 2 INHALATIONS FOUR TIMES A DAY AS NEEDED FOR SHORTNESS OF AIR OR WHEEZING, Disp: 17 g, Rfl: 6    ALPRAZolam (XANAX) 0.25 MG tablet, TAKE 1 TABLET BY MOUTH EVERY NIGHT, Disp: 90 tablet, Rfl: 1    aspirin (aspirin) 81 MG EC tablet, Take  by mouth Daily.,  Disp: , Rfl:     atorvastatin (LIPITOR) 10 MG tablet, Take 1 tablet by mouth Daily., Disp: 90 tablet, Rfl: 3    azelastine (ASTELIN) 0.1 % nasal spray, USE 1 SPRAY IN EACH NOSTRIL TWICE A DAY AS NEEDED, Disp: 90 mL, Rfl: 1    Budeson-Glycopyrrol-Formoterol (Breztri Aerosphere) 160-9-4.8 MCG/ACT aerosol inhaler, Inhale 2 puffs 2 (Two) Times a Day., Disp: 1 each, Rfl: 3    carvedilol (COREG) 25 MG tablet, Take 1 tablet by mouth 2 (Two) Times a Day With Meals., Disp: 180 tablet, Rfl: 3    cholestyramine (QUESTRAN) 4 g packet, MIX AND DRINK 1 PACKET DAILY, Disp: 90 packet, Rfl: 0    ciprofloxacin-dexAMETHasone (Ciprodex) 0.3-0.1 % otic suspension, Administer 4 drops to the right ear 2 (Two) Times a Day., Disp: 7.5 mL, Rfl: 1    cloNIDine (CATAPRES) 0.3 MG tablet, Take 1 tablet by mouth 3 (Three) Times a Day., Disp: 270 tablet, Rfl: 2    clopidogrel (PLAVIX) 75 MG tablet, TAKE 1 TABLET DAILY, Disp: 90 tablet, Rfl: 3    Copper Gluconate (Copper Caps) 2 MG capsule, Take 2 mg by mouth Daily., Disp: , Rfl:     diphenhydrAMINE HCl, Sleep, (ZzzQuil) 25 MG capsule, Take  by mouth Every Night., Disp: , Rfl:     docusate sodium (COLACE) 100 MG capsule, Take 1 capsule by mouth 3 times a day., Disp: , Rfl:     fexofenadine (ALLEGRA) 180 MG tablet, Take 1 tablet by mouth Daily., Disp: , Rfl:     fluticasone (FLONASE) 50 MCG/ACT nasal spray, USE 2 SPRAYS INTO THE  NOSTRIL(S) AS DIRECTED BY PROVIDER DAILY AS NEEDED FOR RHINITIS, Disp: 48 g, Rfl: 3    furosemide (Lasix) 20 MG tablet, Take 1 tablet by mouth 3 (Three) Times a Day., Disp: 270 tablet, Rfl: 3    hydrALAZINE (APRESOLINE) 100 MG tablet, Take 1 tablet by mouth 3 (Three) Times a Day. 100mg daily, Disp: , Rfl:     levothyroxine (Synthroid) 75 MCG tablet, Take 1 tablet by mouth Daily., Disp: 90 tablet, Rfl: 3    magnesium chloride ER 64 MG DR tablet, Take 143 mg by mouth Daily., Disp: , Rfl:     Melatonin 10 MG capsule, Take 3 capsules by mouth Every Night., Disp: , Rfl:      "mesalamine (APRISO) 0.375 g 24 hr capsule, TAKE 4 CAPSULES DAILY, Disp: 360 capsule, Rfl: 0    montelukast (SINGULAIR) 10 MG tablet, TAKE 1 TABLET EVERY NIGHT, Disp: 90 tablet, Rfl: 3    Multiple Vitamins-Minerals (PRESERVISION/LUTEIN) capsule, Take 1 capsule by mouth 2 (two) times a day., Disp: , Rfl:     NIFEdipine CC (ADALAT CC) 90 MG 24 hr tablet, TAKE 1 TABLET DAILY, Disp: 90 tablet, Rfl: 3    omeprazole (priLOSEC) 20 MG capsule, TAKE 1 CAPSULE DAILY, Disp: 90 capsule, Rfl: 1    Oxymetazoline HCl (Nasal Spray) 0.05 % solution, , Disp: , Rfl:     phenylephrine (MAYELA-SYNEPHRINE) 1 % nasal spray, 1 spray into the nostril(s) as directed by provider Daily As Needed (ear bleeding)., Disp: 1 each, Rfl: 0    sodium bicarbonate 650 MG tablet, 2 qam, 1 at noon, and 2 qpm, Disp: 450 tablet, Rfl: 3    spironolactone (ALDACTONE) 25 MG tablet, Take 1 tablet by mouth Daily., Disp: 90 tablet, Rfl: 2    tamsulosin (FLOMAX) 0.4 MG capsule 24 hr capsule, Take 1 capsule by mouth Every Night., Disp: 90 capsule, Rfl: 3    valsartan (DIOVAN) 320 MG tablet, Take 1 tablet by mouth Daily., Disp: , Rfl:     vitamin D (ERGOCALCIFEROL) 1.25 MG (75223 UT) capsule capsule, Take 1 capsule by mouth 1 (One) Time Per Week., Disp: 12 capsule, Rfl: 3    Current Facility-Administered Medications:     cyanocobalamin injection 1,000 mcg, 1,000 mcg, Intramuscular, Q28 Days, Fidel, [place] C, APRN, 1,000 mcg at 08/11/23 1123      /48 (BP Location: Left arm, Patient Position: Sitting, Cuff Size: Adult)   Pulse 51   Temp 98 °F (36.7 °C) (Infrared)   Ht 182.9 cm (72\")   Wt 68.2 kg (150 lb 6.4 oz)   SpO2 94%   BMI 20.40 kg/m²      Body mass index is 20.4 kg/m².  BMI is within normal parameters. No other follow-up for BMI required.       Physical Exam  Vitals and nursing note reviewed.   Constitutional:       General: He is not in acute distress.     Appearance: Normal appearance. He is normal weight. He is not ill-appearing, toxic-appearing " or diaphoretic.   HENT:      Head: Normocephalic and atraumatic.   Eyes:      Extraocular Movements: Extraocular movements intact.      Conjunctiva/sclera: Conjunctivae normal.      Pupils: Pupils are equal, round, and reactive to light.   Cardiovascular:      Rate and Rhythm: Normal rate and regular rhythm.      Pulses: Normal pulses.      Heart sounds: Murmur heard.   Pulmonary:      Effort: Pulmonary effort is normal.      Breath sounds: Normal breath sounds.      Comments: Good improvement noted on auscultation of lungs bilaterally, I did advise that still slightly diminished at base right lower lobe, encouraged to continued good pulmonary toileting  Abdominal:      General: Bowel sounds are normal.      Palpations: Abdomen is soft.   Musculoskeletal:         General: Normal range of motion.      Cervical back: Normal range of motion and neck supple.   Skin:     General: Skin is warm and dry.   Neurological:      General: No focal deficit present.      Mental Status: He is alert and oriented to person, place, and time. Mental status is at baseline.   Psychiatric:         Mood and Affect: Mood normal.         Behavior: Behavior normal.         Thought Content: Thought content normal.         Judgment: Judgment normal.               Assessment & Plan   Diagnoses and all orders for this visit:    1. Pneumonia of right lower lobe due to infectious organism (Primary)  -     XR Chest PA & Lateral; Future    2. Restrictive airway disease  -     Budeson-Glycopyrrol-Formoterol (Breztri Aerosphere) 160-9-4.8 MCG/ACT aerosol inhaler; Inhale 2 puffs 2 (Two) Times a Day.  Dispense: 1 each; Refill: 3    3. CRD (chronic renal disease), stage IV    4. Benign prostatic hyperplasia with incomplete bladder emptying  -     tamsulosin (FLOMAX) 0.4 MG capsule 24 hr capsule; Take 1 capsule by mouth Every Night.  Dispense: 90 capsule; Refill: 3                 Assessment & Plan  1. Right lower lobe pneumonia.  Physical exam significant  for improvement.  His oxygen saturation levels have shown improvement since the last visit. He is advised to persist with deep breathing and coughing exercises, as pneumonia can take up to  6 weeks for complete resolution. He may continue to experience coughing and sputum production over the ensuing weeks. A chest x-ray has been ordered for completion in 6 weeks. Should his condition deteriorate, he is instructed to inform the office.   He reports significant improvement in shortness of air, this is likely combination of appropriate treatment for his pneumonia as well as initiation of maintenance inhaler, he has been advised that the Breztri inhaler may not be covered by insurance or may be costly, if this is the case please let me know and an alternative maintenance inhaler will be prescribed.  He may continue to use albuterol as needed for breakthrough shortness of air/wheezing.    2. Benign prostatic hyperplasia.  A prescription for Flomax 90-day supply with refills will be sent to [facility].  I had intended for him to get an ultrasound of post residual bladder volume completed on same date as the chest x-ray was completed that diagnosis pneumonia, however this was not completed however, per report he has noted great improvement in his urinary symptoms with initiation of Flomax without any adverse side effects so we will continue with current treatment regimen.    3. Restrictive airway disease.  A prescription for the maintenance inhaler will be sent to [facility]. If the cost of the inhaler is prohibitive, he is to inform the clinic so an alternative maintenance inhaler can be prescribed.    Patient or patient representative verbalized consent for the use of Ambient Listening during the visit with  STERLING Lui for chart documentation. 1/3/2025  08:35 CST    Please note that portions of this note were completed with a voice recognition program.     Electronically signed by Ruthie SINGH  STERLING Parra, 01/03/25, 08:36 CST.

## 2025-01-08 ENCOUNTER — TELEPHONE (OUTPATIENT)
Dept: INTERNAL MEDICINE | Facility: CLINIC | Age: 75
End: 2025-01-08

## 2025-01-08 ENCOUNTER — TELEPHONE (OUTPATIENT)
Dept: ONCOLOGY | Facility: CLINIC | Age: 75
End: 2025-01-08

## 2025-01-08 RX ORDER — CEFDINIR 300 MG/1
300 CAPSULE ORAL DAILY
Qty: 5 CAPSULE | Refills: 0 | Status: ON HOLD | OUTPATIENT
Start: 2025-01-08

## 2025-01-08 NOTE — TELEPHONE ENCOUNTER
"Caller: Ricardo Hugo \"Harjinder\"    Relationship:  Self    Best call back number: [phone]    PATIENT CALLED REQUESTING TO CANCEL SAME DAY APPT.    Did the patient call AFTER the start time of their scheduled appointment?  []YES  [x]NO    "

## 2025-01-08 NOTE — TELEPHONE ENCOUNTER
"    Caller: Ricardo Hugo \"Harjinder\"    Relationship: Self    Best call back number: [phone]     What medication are you requesting: ANOTHER ROUND OF ANTIBIOTICS     What are your current symptoms: CONGESTION,SINUS DRAINAGE     How long have you been experiencing symptoms: SINCE HIS LAST VISIT     Have you had these symptoms before:    [x] Yes  [] No    Have you been treated for these symptoms before:   [x] Yes  [] No    If a prescription is needed, what is your preferred pharmacy and phone number: [id number] - [place], [place] - [address]  - [phone] [facility] [phone] FX     "

## 2025-01-10 ENCOUNTER — APPOINTMENT (OUTPATIENT)
Dept: GENERAL RADIOLOGY | Facility: HOSPITAL | Age: 75
End: 2025-01-10
Payer: MEDICARE

## 2025-01-10 ENCOUNTER — HOSPITAL ENCOUNTER (INPATIENT)
Facility: HOSPITAL | Age: 75
LOS: 6 days | Discharge: HOME | End: 2025-01-16
Attending: FAMILY MEDICINE | Admitting: INTERNAL MEDICINE
Payer: MEDICARE

## 2025-01-10 ENCOUNTER — NURSE TRIAGE (OUTPATIENT)
Dept: CALL CENTER | Facility: HOSPITAL | Age: 75
End: 2025-01-10
Payer: MEDICARE

## 2025-01-10 DIAGNOSIS — N18.4 CHRONIC RENAL FAILURE, STAGE 4 (SEVERE): ICD-10-CM

## 2025-01-10 DIAGNOSIS — R05.8 NONPRODUCTIVE COUGH: ICD-10-CM

## 2025-01-10 DIAGNOSIS — I50.33 ACUTE ON CHRONIC DIASTOLIC CONGESTIVE HEART FAILURE: ICD-10-CM

## 2025-01-10 DIAGNOSIS — R00.1 BRADYCARDIA: ICD-10-CM

## 2025-01-10 DIAGNOSIS — I10 HYPERTENSION, UNSPECIFIED TYPE: ICD-10-CM

## 2025-01-10 DIAGNOSIS — R79.89 ELEVATED TROPONIN: ICD-10-CM

## 2025-01-10 DIAGNOSIS — I50.31 ACUTE CONGESTIVE HEART FAILURE WITH LEFT VENTRICULAR DIASTOLIC DYSFUNCTION: Primary | ICD-10-CM

## 2025-01-10 PROBLEM — I50.9 CONGESTIVE HEART FAILURE: Status: ACTIVE | Noted: 2025-01-10

## 2025-01-10 PROBLEM — Z87.01 HISTORY OF PNEUMONIA: Status: ACTIVE | Noted: 2025-01-10

## 2025-01-10 PROBLEM — J96.01 ACUTE RESPIRATORY FAILURE WITH HYPOXIA: Status: ACTIVE | Noted: 2025-01-10

## 2025-01-10 LAB
ALBUMIN SERPL-MCNC: 3.5 G/DL (ref 3.5–5.2)
ALBUMIN/GLOB SERPL: 1.3 G/DL
ALP SERPL-CCNC: 142 U/L (ref 39–117)
ALT SERPL W P-5'-P-CCNC: 12 U/L (ref 1–41)
ANION GAP SERPL CALCULATED.3IONS-SCNC: 18 MMOL/L (ref 5–15)
APTT PPP: 33.6 SECONDS (ref 24.5–36)
ARTERIAL PATENCY WRIST A: POSITIVE
AST SERPL-CCNC: 26 U/L (ref 1–40)
ATMOSPHERIC PRESS: 750 MMHG
B PARAPERT DNA SPEC QL NAA+PROBE: NOT DETECTED
B PERT DNA SPEC QL NAA+PROBE: NOT DETECTED
BASE EXCESS BLDA CALC-SCNC: -7 MMOL/L (ref 0–2)
BASOPHILS # BLD AUTO: 0.06 10*3/MM3 (ref 0–0.2)
BASOPHILS NFR BLD AUTO: 1.2 % (ref 0–1.5)
BDY SITE: ABNORMAL
BILIRUB SERPL-MCNC: 0.4 MG/DL (ref 0–1.2)
BODY TEMPERATURE: 37
BUN SERPL-MCNC: 58 MG/DL (ref 8–23)
BUN/CREAT SERPL: 17 (ref 7–25)
C PNEUM DNA NPH QL NAA+NON-PROBE: NOT DETECTED
CALCIUM SPEC-SCNC: 8.6 MG/DL (ref 8.6–10.5)
CHLORIDE SERPL-SCNC: 104 MMOL/L (ref 98–107)
CO2 SERPL-SCNC: 17 MMOL/L (ref 22–29)
COHGB MFR BLD: 2.8 % (ref 0–5)
CREAT SERPL-MCNC: 3.41 MG/DL (ref 0.76–1.27)
D-LACTATE SERPL-SCNC: 0.9 MMOL/L (ref 0.5–2)
DEPRECATED RDW RBC AUTO: 62.6 FL (ref 37–54)
EGFRCR SERPLBLD CKD-EPI 2021: 17.9 ML/MIN/1.73
EOSINOPHIL # BLD AUTO: 0.18 10*3/MM3 (ref 0–0.4)
EOSINOPHIL NFR BLD AUTO: 3.5 % (ref 0.3–6.2)
ERYTHROCYTE [DISTWIDTH] IN BLOOD BY AUTOMATED COUNT: 16.4 % (ref 12.3–15.4)
FLUAV SUBTYP SPEC NAA+PROBE: NOT DETECTED
FLUBV RNA ISLT QL NAA+PROBE: NOT DETECTED
GEN 5 1HR TROPONIN T REFLEX: 62 NG/L
GLOBULIN UR ELPH-MCNC: 2.6 GM/DL
GLUCOSE SERPL-MCNC: 85 MG/DL (ref 65–99)
HADV DNA SPEC NAA+PROBE: NOT DETECTED
HCO3 BLDA-SCNC: 17.8 MMOL/L (ref 20–26)
HCOV 229E RNA SPEC QL NAA+PROBE: NOT DETECTED
HCOV HKU1 RNA SPEC QL NAA+PROBE: NOT DETECTED
HCOV NL63 RNA SPEC QL NAA+PROBE: NOT DETECTED
HCOV OC43 RNA SPEC QL NAA+PROBE: NOT DETECTED
HCT VFR BLD AUTO: 28.6 % (ref 37.5–51)
HCT VFR BLD CALC: 27.9 % (ref 38–51)
HGB BLD-MCNC: 9 G/DL (ref 13–17.7)
HGB BLDA-MCNC: 9.1 G/DL (ref 14–18)
HMPV RNA NPH QL NAA+NON-PROBE: NOT DETECTED
HPIV1 RNA ISLT QL NAA+PROBE: NOT DETECTED
HPIV2 RNA SPEC QL NAA+PROBE: NOT DETECTED
HPIV3 RNA NPH QL NAA+PROBE: NOT DETECTED
HPIV4 P GENE NPH QL NAA+PROBE: NOT DETECTED
IMM GRANULOCYTES # BLD AUTO: 0.04 10*3/MM3 (ref 0–0.05)
IMM GRANULOCYTES NFR BLD AUTO: 0.8 % (ref 0–0.5)
INR PPP: 1.41 (ref 0.91–1.09)
LYMPHOCYTES # BLD AUTO: 0.5 10*3/MM3 (ref 0.7–3.1)
LYMPHOCYTES NFR BLD AUTO: 9.7 % (ref 19.6–45.3)
Lab: ABNORMAL
M PNEUMO IGG SER IA-ACNC: NOT DETECTED
MAGNESIUM SERPL-MCNC: 2.2 MG/DL (ref 1.6–2.4)
MCH RBC QN AUTO: 32.5 PG (ref 26.6–33)
MCHC RBC AUTO-ENTMCNC: 31.5 G/DL (ref 31.5–35.7)
MCV RBC AUTO: 103.2 FL (ref 79–97)
METHGB BLD QL: 0.4 % (ref 0–3)
MODALITY: ABNORMAL
MONOCYTES # BLD AUTO: 0.41 10*3/MM3 (ref 0.1–0.9)
MONOCYTES NFR BLD AUTO: 7.9 % (ref 5–12)
NEUTROPHILS NFR BLD AUTO: 3.98 10*3/MM3 (ref 1.7–7)
NEUTROPHILS NFR BLD AUTO: 76.9 % (ref 42.7–76)
NRBC BLD AUTO-RTO: 0 /100 WBC (ref 0–0.2)
NT-PROBNP SERPL-MCNC: ABNORMAL PG/ML (ref 0–1800)
OXYHGB MFR BLDV: 89.5 % (ref 94–99)
PCO2 BLDA: 32.3 MM HG (ref 35–45)
PCO2 TEMP ADJ BLD: 32.3 MM HG (ref 35–45)
PH BLDA: 7.35 PH UNITS (ref 7.35–7.45)
PH, TEMP CORRECTED: 7.35 PH UNITS (ref 7.35–7.45)
PLATELET # BLD AUTO: 80 10*3/MM3 (ref 140–450)
PMV BLD AUTO: 10.9 FL (ref 6–12)
PO2 BLDA: 64.5 MM HG (ref 83–108)
PO2 TEMP ADJ BLD: 64.5 MM HG (ref 83–108)
POTASSIUM BLDA-SCNC: 3.8 MMOL/L (ref 3.5–5.2)
POTASSIUM SERPL-SCNC: 4.4 MMOL/L (ref 3.5–5.2)
PROCALCITONIN SERPL-MCNC: 0.26 NG/ML (ref 0–0.25)
PROT SERPL-MCNC: 6.1 G/DL (ref 6–8.5)
PROTHROMBIN TIME: 18.1 SECONDS (ref 11.8–14.8)
RBC # BLD AUTO: 2.77 10*6/MM3 (ref 4.14–5.8)
RHINOVIRUS RNA SPEC NAA+PROBE: NOT DETECTED
RSV RNA NPH QL NAA+NON-PROBE: NOT DETECTED
SAO2 % BLDCOA: 92.5 % (ref 94–99)
SARS-COV-2 RNA RESP QL NAA+PROBE: NOT DETECTED
SODIUM BLDA-SCNC: 139 MMOL/L (ref 136–145)
SODIUM SERPL-SCNC: 139 MMOL/L (ref 136–145)
TROPONIN T % DELTA: -5 %
TROPONIN T NUMERIC DELTA: -3 NG/L
TROPONIN T SERPL HS-MCNC: 65 NG/L
VENTILATOR MODE: ABNORMAL
WBC NRBC COR # BLD AUTO: 5.17 10*3/MM3 (ref 3.4–10.8)

## 2025-01-10 PROCEDURE — 25010000002 METHYLPREDNISOLONE PER 125 MG: Performed by: FAMILY MEDICINE

## 2025-01-10 PROCEDURE — 25010000002 FUROSEMIDE PER 20 MG: Performed by: NURSE PRACTITIONER

## 2025-01-10 PROCEDURE — 71045 X-RAY EXAM CHEST 1 VIEW: CPT

## 2025-01-10 PROCEDURE — 84145 PROCALCITONIN (PCT): CPT | Performed by: FAMILY MEDICINE

## 2025-01-10 PROCEDURE — 36600 WITHDRAWAL OF ARTERIAL BLOOD: CPT

## 2025-01-10 PROCEDURE — 83735 ASSAY OF MAGNESIUM: CPT | Performed by: FAMILY MEDICINE

## 2025-01-10 PROCEDURE — 25010000002 MAGNESIUM SULFATE 2 GM/50ML SOLUTION: Performed by: FAMILY MEDICINE

## 2025-01-10 PROCEDURE — 93005 ELECTROCARDIOGRAM TRACING: CPT | Performed by: FAMILY MEDICINE

## 2025-01-10 PROCEDURE — 93010 ELECTROCARDIOGRAM REPORT: CPT | Performed by: STUDENT IN AN ORGANIZED HEALTH CARE EDUCATION/TRAINING PROGRAM

## 2025-01-10 PROCEDURE — 85610 PROTHROMBIN TIME: CPT | Performed by: FAMILY MEDICINE

## 2025-01-10 PROCEDURE — 94761 N-INVAS EAR/PLS OXIMETRY MLT: CPT

## 2025-01-10 PROCEDURE — 99285 EMERGENCY DEPT VISIT HI MDM: CPT

## 2025-01-10 PROCEDURE — 83605 ASSAY OF LACTIC ACID: CPT | Performed by: FAMILY MEDICINE

## 2025-01-10 PROCEDURE — 82805 BLOOD GASES W/O2 SATURATION: CPT

## 2025-01-10 PROCEDURE — 82375 ASSAY CARBOXYHB QUANT: CPT

## 2025-01-10 PROCEDURE — 83880 ASSAY OF NATRIURETIC PEPTIDE: CPT | Performed by: FAMILY MEDICINE

## 2025-01-10 PROCEDURE — 85025 COMPLETE CBC W/AUTO DIFF WBC: CPT | Performed by: FAMILY MEDICINE

## 2025-01-10 PROCEDURE — 36415 COLL VENOUS BLD VENIPUNCTURE: CPT

## 2025-01-10 PROCEDURE — 87040 BLOOD CULTURE FOR BACTERIA: CPT | Performed by: FAMILY MEDICINE

## 2025-01-10 PROCEDURE — 25010000002 HYDRALAZINE PER 20 MG: Performed by: HOSPITALIST

## 2025-01-10 PROCEDURE — 0202U NFCT DS 22 TRGT SARS-COV-2: CPT | Performed by: FAMILY MEDICINE

## 2025-01-10 PROCEDURE — 85730 THROMBOPLASTIN TIME PARTIAL: CPT | Performed by: FAMILY MEDICINE

## 2025-01-10 PROCEDURE — 84484 ASSAY OF TROPONIN QUANT: CPT | Performed by: FAMILY MEDICINE

## 2025-01-10 PROCEDURE — 83050 HGB METHEMOGLOBIN QUAN: CPT

## 2025-01-10 PROCEDURE — 80053 COMPREHEN METABOLIC PANEL: CPT | Performed by: FAMILY MEDICINE

## 2025-01-10 PROCEDURE — 94640 AIRWAY INHALATION TREATMENT: CPT

## 2025-01-10 RX ORDER — HYDRALAZINE HYDROCHLORIDE 50 MG/1
100 TABLET, FILM COATED ORAL 3 TIMES DAILY
Status: DISCONTINUED | OUTPATIENT
Start: 2025-01-10 | End: 2025-01-16 | Stop reason: HOSPADM

## 2025-01-10 RX ORDER — ACETAMINOPHEN 650 MG/1
650 SUPPOSITORY RECTAL EVERY 4 HOURS PRN
Status: DISCONTINUED | OUTPATIENT
Start: 2025-01-10 | End: 2025-01-16 | Stop reason: HOSPADM

## 2025-01-10 RX ORDER — VALSARTAN 80 MG/1
320 TABLET ORAL DAILY
Status: DISCONTINUED | OUTPATIENT
Start: 2025-01-11 | End: 2025-01-16 | Stop reason: HOSPADM

## 2025-01-10 RX ORDER — SODIUM CHLORIDE 9 MG/ML
40 INJECTION, SOLUTION INTRAVENOUS AS NEEDED
Status: DISCONTINUED | OUTPATIENT
Start: 2025-01-10 | End: 2025-01-16 | Stop reason: HOSPADM

## 2025-01-10 RX ORDER — TAMSULOSIN HYDROCHLORIDE 0.4 MG/1
0.4 CAPSULE ORAL NIGHTLY
Status: DISCONTINUED | OUTPATIENT
Start: 2025-01-11 | End: 2025-01-16 | Stop reason: HOSPADM

## 2025-01-10 RX ORDER — BISACODYL 10 MG
10 SUPPOSITORY, RECTAL RECTAL DAILY PRN
Status: DISCONTINUED | OUTPATIENT
Start: 2025-01-10 | End: 2025-01-16 | Stop reason: HOSPADM

## 2025-01-10 RX ORDER — BISACODYL 5 MG/1
5 TABLET, DELAYED RELEASE ORAL DAILY PRN
Status: DISCONTINUED | OUTPATIENT
Start: 2025-01-10 | End: 2025-01-16 | Stop reason: HOSPADM

## 2025-01-10 RX ORDER — MAGNESIUM SULFATE HEPTAHYDRATE 40 MG/ML
2 INJECTION, SOLUTION INTRAVENOUS ONCE
Status: COMPLETED | OUTPATIENT
Start: 2025-01-10 | End: 2025-01-10

## 2025-01-10 RX ORDER — NITROGLYCERIN 0.4 MG/1
0.4 TABLET SUBLINGUAL
Status: DISCONTINUED | OUTPATIENT
Start: 2025-01-10 | End: 2025-01-16 | Stop reason: HOSPADM

## 2025-01-10 RX ORDER — CLONIDINE HYDROCHLORIDE 0.1 MG/1
0.3 TABLET ORAL 3 TIMES DAILY
Status: DISCONTINUED | OUTPATIENT
Start: 2025-01-10 | End: 2025-01-16 | Stop reason: HOSPADM

## 2025-01-10 RX ORDER — DOCUSATE SODIUM 100 MG/1
100 CAPSULE, LIQUID FILLED ORAL 3 TIMES DAILY
Status: DISCONTINUED | OUTPATIENT
Start: 2025-01-10 | End: 2025-01-16 | Stop reason: HOSPADM

## 2025-01-10 RX ORDER — AMOXICILLIN 250 MG
2 CAPSULE ORAL 2 TIMES DAILY PRN
Status: DISCONTINUED | OUTPATIENT
Start: 2025-01-10 | End: 2025-01-16 | Stop reason: HOSPADM

## 2025-01-10 RX ORDER — PANTOPRAZOLE SODIUM 40 MG/1
40 TABLET, DELAYED RELEASE ORAL
Status: DISCONTINUED | OUTPATIENT
Start: 2025-01-11 | End: 2025-01-16 | Stop reason: HOSPADM

## 2025-01-10 RX ORDER — MESALAMINE 0.38 G/1
1.5 CAPSULE, EXTENDED RELEASE ORAL DAILY
Status: DISCONTINUED | OUTPATIENT
Start: 2025-01-11 | End: 2025-01-16 | Stop reason: HOSPADM

## 2025-01-10 RX ORDER — IPRATROPIUM BROMIDE AND ALBUTEROL SULFATE 2.5; .5 MG/3ML; MG/3ML
3 SOLUTION RESPIRATORY (INHALATION) ONCE
Status: COMPLETED | OUTPATIENT
Start: 2025-01-10 | End: 2025-01-10

## 2025-01-10 RX ORDER — FLUTICASONE PROPIONATE 50 MCG
2 SPRAY, SUSPENSION (ML) NASAL DAILY
Status: DISCONTINUED | OUTPATIENT
Start: 2025-01-11 | End: 2025-01-16 | Stop reason: HOSPADM

## 2025-01-10 RX ORDER — CETIRIZINE HYDROCHLORIDE 10 MG/1
10 TABLET ORAL DAILY
Status: DISCONTINUED | OUTPATIENT
Start: 2025-01-11 | End: 2025-01-16 | Stop reason: HOSPADM

## 2025-01-10 RX ORDER — CHOLESTYRAMINE 4 G/9G
1 POWDER, FOR SUSPENSION ORAL DAILY
Status: DISCONTINUED | OUTPATIENT
Start: 2025-01-11 | End: 2025-01-16 | Stop reason: HOSPADM

## 2025-01-10 RX ORDER — CLOPIDOGREL BISULFATE 75 MG/1
75 TABLET ORAL DAILY
Status: DISCONTINUED | OUTPATIENT
Start: 2025-01-11 | End: 2025-01-16 | Stop reason: HOSPADM

## 2025-01-10 RX ORDER — ACETAMINOPHEN 325 MG/1
650 TABLET ORAL EVERY 4 HOURS PRN
Status: DISCONTINUED | OUTPATIENT
Start: 2025-01-10 | End: 2025-01-16 | Stop reason: HOSPADM

## 2025-01-10 RX ORDER — PROMETHAZINE HYDROCHLORIDE 25 MG/1
12.5 TABLET ORAL EVERY 6 HOURS PRN
Status: DISCONTINUED | OUTPATIENT
Start: 2025-01-10 | End: 2025-01-16 | Stop reason: HOSPADM

## 2025-01-10 RX ORDER — ACETAMINOPHEN 160 MG/5ML
650 SOLUTION ORAL EVERY 4 HOURS PRN
Status: DISCONTINUED | OUTPATIENT
Start: 2025-01-10 | End: 2025-01-16 | Stop reason: HOSPADM

## 2025-01-10 RX ORDER — CLONIDINE 0.2 MG/24H
1 PATCH, EXTENDED RELEASE TRANSDERMAL WEEKLY
COMMUNITY

## 2025-01-10 RX ORDER — SODIUM BICARBONATE 650 MG/1
1300 TABLET ORAL EVERY 12 HOURS
Status: DISCONTINUED | OUTPATIENT
Start: 2025-01-10 | End: 2025-01-16 | Stop reason: HOSPADM

## 2025-01-10 RX ORDER — HYDRALAZINE HYDROCHLORIDE 20 MG/ML
10 INJECTION INTRAMUSCULAR; INTRAVENOUS EVERY 4 HOURS PRN
Status: DISPENSED | OUTPATIENT
Start: 2025-01-10 | End: 2025-01-15

## 2025-01-10 RX ORDER — SODIUM CHLORIDE 0.9 % (FLUSH) 0.9 %
10 SYRINGE (ML) INJECTION AS NEEDED
Status: DISCONTINUED | OUTPATIENT
Start: 2025-01-10 | End: 2025-01-16 | Stop reason: HOSPADM

## 2025-01-10 RX ORDER — LEVOTHYROXINE SODIUM 75 UG/1
75 TABLET ORAL
Status: DISCONTINUED | OUTPATIENT
Start: 2025-01-11 | End: 2025-01-16 | Stop reason: HOSPADM

## 2025-01-10 RX ORDER — METHYLPREDNISOLONE SODIUM SUCCINATE 125 MG/2ML
125 INJECTION, POWDER, LYOPHILIZED, FOR SOLUTION INTRAMUSCULAR; INTRAVENOUS ONCE
Status: COMPLETED | OUTPATIENT
Start: 2025-01-10 | End: 2025-01-10

## 2025-01-10 RX ORDER — SODIUM CHLORIDE 0.9 % (FLUSH) 0.9 %
10 SYRINGE (ML) INJECTION EVERY 12 HOURS SCHEDULED
Status: DISCONTINUED | OUTPATIENT
Start: 2025-01-10 | End: 2025-01-16 | Stop reason: HOSPADM

## 2025-01-10 RX ORDER — SODIUM BICARBONATE 650 MG/1
650 TABLET ORAL
Status: DISCONTINUED | OUTPATIENT
Start: 2025-01-11 | End: 2025-01-16 | Stop reason: HOSPADM

## 2025-01-10 RX ORDER — ASPIRIN 81 MG/1
81 TABLET ORAL DAILY
Status: DISCONTINUED | OUTPATIENT
Start: 2025-01-11 | End: 2025-01-16 | Stop reason: HOSPADM

## 2025-01-10 RX ORDER — ATORVASTATIN CALCIUM 10 MG/1
10 TABLET, FILM COATED ORAL DAILY
Status: DISCONTINUED | OUTPATIENT
Start: 2025-01-11 | End: 2025-01-16 | Stop reason: HOSPADM

## 2025-01-10 RX ORDER — POLYETHYLENE GLYCOL 3350 17 G/17G
17 POWDER, FOR SOLUTION ORAL DAILY PRN
Status: DISCONTINUED | OUTPATIENT
Start: 2025-01-10 | End: 2025-01-16 | Stop reason: HOSPADM

## 2025-01-10 RX ORDER — MONTELUKAST SODIUM 10 MG/1
10 TABLET ORAL NIGHTLY
Status: DISCONTINUED | OUTPATIENT
Start: 2025-01-11 | End: 2025-01-16 | Stop reason: HOSPADM

## 2025-01-10 RX ORDER — FUROSEMIDE 10 MG/ML
40 INJECTION INTRAMUSCULAR; INTRAVENOUS
Status: DISCONTINUED | OUTPATIENT
Start: 2025-01-10 | End: 2025-01-14

## 2025-01-10 RX ADMIN — IPRATROPIUM BROMIDE AND ALBUTEROL SULFATE 3 ML: .5; 3 SOLUTION RESPIRATORY (INHALATION) at 13:58

## 2025-01-10 RX ADMIN — DOCUSATE SODIUM 100 MG: 100 CAPSULE, LIQUID FILLED ORAL at 21:11

## 2025-01-10 RX ADMIN — MAGNESIUM SULFATE HEPTAHYDRATE 2 G: 2 INJECTION, SOLUTION INTRAVENOUS at 15:16

## 2025-01-10 RX ADMIN — Medication 10 ML: at 21:11

## 2025-01-10 RX ADMIN — FUROSEMIDE 40 MG: 10 INJECTION, SOLUTION INTRAVENOUS at 18:28

## 2025-01-10 RX ADMIN — HYDRALAZINE HYDROCHLORIDE 100 MG: 50 TABLET ORAL at 21:11

## 2025-01-10 RX ADMIN — SODIUM BICARBONATE 1300 MG: 650 TABLET ORAL at 21:11

## 2025-01-10 RX ADMIN — CLONIDINE HYDROCHLORIDE 0.3 MG: 0.1 TABLET ORAL at 21:11

## 2025-01-10 RX ADMIN — Medication 10 MG: at 21:11

## 2025-01-10 RX ADMIN — HYDRALAZINE HYDROCHLORIDE 10 MG: 20 INJECTION INTRAMUSCULAR; INTRAVENOUS at 22:32

## 2025-01-10 RX ADMIN — METHYLPREDNISOLONE SODIUM SUCCINATE 125 MG: 125 INJECTION, POWDER, FOR SOLUTION INTRAMUSCULAR; INTRAVENOUS at 15:11

## 2025-01-10 NOTE — TELEPHONE ENCOUNTER
"Requesting prescription be sent to [facility]. Requesting something for congestion. Has 2 doses Cefdinir remaining. Spoke with STERLING Trimble. Recommends OTC Mucinex 600 mg twice per day. If not improving, go to Urgent Care or ER for evaluation. Patient's friend notified. States she is going to take him to the ER now.    Reason for Disposition   [1] MILD difficulty breathing (e.g., minimal/no SOB at rest, SOB with walking, pulse <100) AND [2] NEW-onset or WORSE than normal    Additional Information   Negative: SEVERE difficulty breathing (e.g., struggling for each breath, speaks in single words)   Negative: [1] Breathing stopped AND [2] hasn't returned   Negative: Choking on something   Negative: Bluish (or gray) lips or face now   Negative: Difficult to awaken or acting confused (e.g., disoriented, slurred speech)   Negative: Passed out (i.e., lost consciousness, collapsed and was not responding)   Negative: Wheezing started suddenly after medicine, an allergic food or bee sting   Negative: Stridor (harsh sound while breathing in)   Negative: Slow, shallow and weak breathing   Negative: Sounds like a life-threatening emergency to the triager   Negative: Chest pain   Negative: [1] Wheezing (high pitched whistling sound) AND [2] previous asthma attacks or use of asthma medicines   Negative: [1] Difficulty breathing AND [2] only present when coughing   Negative: [1] Difficulty breathing AND [2] only from stuffy or runny nose   Negative: [1] Difficulty breathing AND [2] within 14 days of COVID-19 Exposure   Negative: [1] MODERATE difficulty breathing (e.g., speaks in phrases, SOB even at rest, pulse 100-120) AND [2] NEW-onset or WORSE than normal   Negative: Oxygen level (e.g., pulse oximetry) 90 percent or lower   Negative: Wheezing can be heard across the room   Negative: Drooling or spitting out saliva (because can't swallow)   Negative: History of prior \"blood clot\" in leg or lungs (i.e., deep " "vein thrombosis, pulmonary embolism)   Negative: History of inherited increased risk of blood clots (e.g., Factor 5 Leiden, Anti-thrombin 3, Protein C or Protein S deficiency, Prothrombin mutation)   Negative: Major surgery in the past month   Negative: Hip or leg fracture (broken bone) in past month (or had cast on leg or ankle in past month)   Negative: Illness requiring prolonged bedrest in past month (e.g., immobilization, long hospital stay)   Negative: Long-distance travel in past month (e.g., car, bus, train, plane; with trip lasting 6 or more hours)   Negative: Cancer treatment in past six months (or has cancer now)   Negative: Extra heartbeats, irregular heart beating, or heart is beating very fast  (i.e., \"palpitations\")   Negative: Fever > 103 F (39.4 C)   Negative: [1] Fever > 101 F (38.3 C) AND [2] age > 60 years   Negative: [1] Fever > 100.0 F (37.8 C) AND [2] bedridden (e.g., CVA, chronic illness, recovering from surgery)   Negative: [1] Fever > 100.0 F (37.8 C) AND [2] diabetes mellitus or weak immune system (e.g., HIV positive, cancer chemo, splenectomy, organ transplant, chronic steroids)   Negative: [1] Periods where breathing stops and then resumes normally AND [2] bedridden (e.g., CVA)   Negative: Pregnant or postpartum (from 0 to 6 weeks after delivery)   Negative: Patient sounds very sick or weak to the triager    Answer Assessment - Initial Assessment Questions  1. RESPIRATORY STATUS: \"Describe your breathing?\" (e.g., wheezing, shortness of breath, unable to speak, severe coughing)       Congestion, shortness of breath, audible wheezing  2. ONSET: \"When did this breathing problem begin?\"       See chart  3. PATTERN \"Does the difficult breathing come and go, or has it been constant since it started?\"       Constant   4. SEVERITY: \"How bad is your breathing?\" (e.g., mild, moderate, severe)     - MILD: No SOB at rest, mild SOB with walking, speaks normally in sentences, can lie down, no " "retractions, pulse < 100.     - MODERATE: SOB at rest, SOB with minimal exertion and prefers to sit, cannot lie down flat, speaks in phrases, mild retractions, audible wheezing, pulse 100-120.     - SEVERE: Very SOB at rest, speaks in single words, struggling to breathe, sitting hunched forward, retractions, pulse > 120       Moderate   5. RECURRENT SYMPTOM: \"Have you had difficulty breathing before?\" If Yes, ask: \"When was the last time?\" and \"What happened that time?\"       No   6. CARDIAC HISTORY: \"Do you have any history of heart disease?\" (e.g., heart attack, angina, bypass surgery, angioplasty)       See chart  7. LUNG HISTORY: \"Do you have any history of lung disease?\"  (e.g., pulmonary embolus, asthma, emphysema)      No   8. CAUSE: \"What do you think is causing the breathing problem?\"       URI  9. OTHER SYMPTOMS: \"Do you have any other symptoms? (e.g., dizziness, runny nose, cough, chest pain, fever)      Wheezing, congestion, cough  10. O2 SATURATION MONITOR:  \"Do you use an oxygen saturation monitor (pulse oximeter) at home?\" If Yes, ask: \"What is your reading (oxygen level) today?\" \"What is your usual oxygen saturation reading?\" (e.g., 95%)        Not available  11. PREGNANCY: \"Is there any chance you are pregnant?\" \"When was your last menstrual period?\"        N/A  12. TRAVEL: \"Have you traveled out of the country in the last month?\" (e.g., travel history, exposures)        No    Protocols used: Breathing Difficulty-ADULT-AH    "

## 2025-01-10 NOTE — ED PROVIDER NOTES
HPI:    Patient is a 74-year-old white male with worsening shortness of breath when he gets up and ambulates his oxygen drops down into the mid 80s.  Upon arrival here in the emergency room just getting out of the wheelchair and into the bed his oxygen dropped as low as 88 to 89%.  Patient has been treated with Augmentin and Zithromax for a right lower lobe pneumonia but symptoms are still worsening.  Thus the reason why he is coming here to the emergency room today.  Patient states the cough is nonproductive.  No headache or vision changes.    REVIEW OF SYSTEMS  CONSTITUTIONAL:  No complaints of fever, chills,or weakness  EYES:  No complaints of discharge   ENT: No complaints of sore throat or ear pain  CARDIOVASCULAR:  No complaints of chest pain, palpitations, or swelling  RESPIRATORY: Positive for shortness of breath and cough nonproductive   GI:  No complaints of abdominal pain, nausea, vomiting, or diarrhea  MUSCULOSKELETAL:  No complaints of back pain  SKIN:  No complaints of rash  NEUROLOGIC:  No complaints of headache, focal weakness, or sensory changes  ENDOCRINE:  No complaints of polyuria or polydipsia  LYMPHATIC:  No complaints of swollen glands  GENITOURINARY: No complaints of urinary frequency or hematuria        PAST MEDICAL HISTORY  Past Medical History:   Diagnosis Date    3-vessel CAD 8/11/2020    Allergic rhinitis     Anxiety disorder 4/27/2020    Arthritis     Asymmetrical sensorineural hearing loss 6/28/2017    Atherosclerosis of native artery of both lower extremities with intermittent claudication 7/18/2019    Avascular necrosis of femoral head, left 07/11/2020    right hip after surgery    Carotid stenosis     Chronic mucoid otitis media     Chronic rhinitis     Coronary artery disease     HEART BYPASS 2004    Crohn's disease of large intestine with other complication 7/30/2020    Chronic diarrhea Colonoscopy July 2020 revealed mild patchy scattered hemosiderin staining with inflammation  more so in rectosigmoid area.  [facility] IBD first step consistent with Crohn's    Displacement of lumbar intervertebral disc without myelopathy 2020    per pt not true    ED (erectile dysfunction) of organic origin 2020    Eustachian tube dysfunction     GERD (gastroesophageal reflux disease)     Hyperlipidemia 2020    Hypertension, benign 2020    Idiopathic acroosteolysis 2020    Iron deficiency anemia 2020    Mixed hearing loss of left ear     PAD (peripheral artery disease) 2020    Perianal abscess     Pernicious anemia 2020    took shots but never diagnosed with b12 deficiency    Personal history of alcoholism 2020    quit drinking in     Prostatic hypertrophy 2020    Sensorineural hearing loss     Sepsis with acute renal failure 9/15/2020    Shortness of breath 2021    Tinnitus     Ventricular tachycardia, nonsustained 2020    Weight loss 2020       FAMILY HISTORY  Family History   Problem Relation Age of Onset    Breast cancer Mother     Dementia Father     Glaucoma Father     No Known Problems Daughter     Colon polyps Neg Hx     Colon cancer Neg Hx        SOCIAL HISTORY  Social History     Socioeconomic History    Marital status:    Tobacco Use    Smoking status: Former     Current packs/day: 0.00     Average packs/day: 0.5 packs/day for 25.8 years (12.9 ttl pk-yrs)     Types: Cigarettes     Start date:      Quit date: 10/13/2013     Years since quittin.2     Passive exposure: Past    Smokeless tobacco: Never    Tobacco comments:     quit 2013   Vaping Use    Vaping status: Never Used   Substance and Sexual Activity    Alcohol use: Not Currently    Drug use: No    Sexual activity: Not Currently     Partners: Female       IMMUNIZATION HISTORY  Deferred to primary care physician.    SURGICAL HISTORY  Past Surgical History:   Procedure Laterality Date    ARTERY SURGERY      right side on neck    CAROTID  ENDARTERECTOMY Right 5/10/2021    Procedure: RIGHT CAROTID ENDARTERECTOMY WITH EEG;  Surgeon: Gil Pineda DO;  Location: [facility] HYBRID OR 12;  Service: Vascular;  Laterality: Right;    COLONOSCOPY N/A 7/2/2020    Procedure: COLONOSCOPY WITH ANESTHESIA;  Surgeon: Adrien Brewster MD;  Location: [facility] ENDOSCOPY;  Service: Gastroenterology;  Laterality: N/A;  pre op: diarrhea  post op: polyps  PCP: Joe Velasco MD    COLONOSCOPY N/A 10/13/2020    Procedure: COLONOSCOPY WITH ANESTHESIA;  Surgeon: Adrien Brewster MD;  Location: [facility] ENDOSCOPY;  Service: Gastroenterology;  Laterality: N/A;  Pre: Chronic Diarrhea, Crohn's  Post: AVM  Dr. Neftali Velasco  CO2 Inflation Used    COLONOSCOPY N/A 12/8/2023    Procedure: COLONOSCOPY WITH ANESTHESIA;  Surgeon: Adrien Brewster MD;  Location: [facility] ENDOSCOPY;  Service: Gastroenterology;  Laterality: N/A;  pre chrone's disease  post sub optimal prep, polyp, chrone's      CORONARY ARTERY BYPASS GRAFT  2003    x3    ENDOSCOPY N/A 11/2/2021    Procedure: ESOPHAGOGASTRODUODENOSCOPY WITH ANESTHESIA;  Surgeon: Bridger Bell MD;  Location: [facility] ENDOSCOPY;  Service: Gastroenterology;  Laterality: N/A;  pre anemia;gi bleed  post  gi bleed;schatski ring  Dr. ERIC Velasco    ENDOSCOPY N/A 10/10/2023    Procedure: ESOPHAGOGASTRODUODENOSCOPY WITH ANESTHESIA;  Surgeon: Adrien Brewster MD;  Location: [facility] ENDOSCOPY;  Service: Gastroenterology;  Laterality: N/A;  preop; anemia  postop esophagitis ; R/O barretts   PCP Randall Beata    EYE SURGERY Bilateral     catorac    INCISION AND DRAINAGE PERIRECTAL ABSCESS N/A 3/3/2017    Procedure: INCISION AND DRAINAGE OF JEET ANAL ABSCESS;  Surgeon: Lynette Smith MD;  Location: [facility] OR;  Service:     INGUINAL HERNIA REPAIR Bilateral 6/27/2023    Procedure: INGUINAL HERNIA BILATERAL REPAIR LAPAROSCOPIC WITH DAVINCI ROBOT WITH MESH;  Surgeon: Tahira Rivera MD;  Location: [facility] OR;  Service: Robotics -  Juliadaniloyasir;  Laterality: Bilateral;    MYRINGOTOMY W/ TUBES Left 04/17/2017    06/10/2016    TONSILLECTOMY      TOTAL HIP ARTHROPLASTY Right 2006       CURRENT MEDICATIONS    Current Facility-Administered Medications:     cyanocobalamin injection 1,000 mcg, 1,000 mcg, Intramuscular, Q28 Days, Ruthie Parra, APRN, 1,000 mcg at 08/11/23 1123    Current Outpatient Medications:     albuterol sulfate  (90 Base) MCG/ACT inhaler, USE 2 INHALATIONS FOUR TIMES A DAY AS NEEDED FOR SHORTNESS OF AIR OR WHEEZING, Disp: 17 g, Rfl: 6    ALPRAZolam (XANAX) 0.25 MG tablet, TAKE 1 TABLET BY MOUTH EVERY NIGHT (Patient taking differently: Take 1 tablet by mouth At Night As Needed.), Disp: 90 tablet, Rfl: 1    aspirin (aspirin) 81 MG EC tablet, Take  by mouth Daily., Disp: , Rfl:     atorvastatin (LIPITOR) 10 MG tablet, Take 1 tablet by mouth Daily., Disp: 90 tablet, Rfl: 3    azelastine (ASTELIN) 0.1 % nasal spray, USE 1 SPRAY IN EACH NOSTRIL TWICE A DAY AS NEEDED, Disp: 90 mL, Rfl: 1    carvedilol (COREG) 25 MG tablet, Take 1 tablet by mouth 2 (Two) Times a Day With Meals., Disp: 180 tablet, Rfl: 3    cefdinir (OMNICEF) 300 MG capsule, Take 1 capsule by mouth Daily., Disp: 5 capsule, Rfl: 0    cholestyramine (QUESTRAN) 4 g packet, MIX AND DRINK 1 PACKET DAILY, Disp: 90 packet, Rfl: 0    cloNIDine (CATAPRES) 0.3 MG tablet, Take 1 tablet by mouth 3 (Three) Times a Day., Disp: 270 tablet, Rfl: 2    cloNIDine (CATAPRES-TTS) 0.2 MG/24HR patch, Place 1 patch on the skin as directed by provider 1 (One) Time Per Week. THURSDAYS, Disp: , Rfl:     clopidogrel (PLAVIX) 75 MG tablet, TAKE 1 TABLET DAILY, Disp: 90 tablet, Rfl: 3    Copper Gluconate (Copper Caps) 2 MG capsule, Take 2 mg by mouth Daily., Disp: , Rfl:     diphenhydrAMINE HCl, Sleep, (ZzzQuil) 25 MG capsule, Take  by mouth Every Night., Disp: , Rfl:     docusate sodium (COLACE) 100 MG capsule, Take 1 capsule by mouth 3 times a day., Disp: , Rfl:     fexofenadine (ALLEGRA)  180 MG tablet, Take 1 tablet by mouth Daily., Disp: , Rfl:     fluticasone (FLONASE) 50 MCG/ACT nasal spray, USE 2 SPRAYS INTO THE  NOSTRIL(S) AS DIRECTED BY PROVIDER DAILY AS NEEDED FOR RHINITIS, Disp: 48 g, Rfl: 3    furosemide (Lasix) 20 MG tablet, Take 1 tablet by mouth 3 (Three) Times a Day., Disp: 270 tablet, Rfl: 3    hydrALAZINE (APRESOLINE) 100 MG tablet, Take 1 tablet by mouth 3 (Three) Times a Day. 100mg daily, Disp: , Rfl:     levothyroxine (Synthroid) 75 MCG tablet, Take 1 tablet by mouth Daily., Disp: 90 tablet, Rfl: 3    Magnesium Cl-Calcium Carbonate (SLOW-MAG PO), Take 143 mg by mouth Daily., Disp: , Rfl:     Melatonin 10 MG capsule, Take 3 capsules by mouth Every Night., Disp: , Rfl:     mesalamine (APRISO) 0.375 g 24 hr capsule, TAKE 4 CAPSULES DAILY, Disp: 360 capsule, Rfl: 0    montelukast (SINGULAIR) 10 MG tablet, TAKE 1 TABLET EVERY NIGHT, Disp: 90 tablet, Rfl: 3    Multiple Vitamins-Minerals (PRESERVISION/LUTEIN) capsule, Take 1 capsule by mouth 2 (two) times a day., Disp: , Rfl:     NIFEdipine CC (ADALAT CC) 90 MG 24 hr tablet, TAKE 1 TABLET DAILY, Disp: 90 tablet, Rfl: 3    omeprazole (priLOSEC) 20 MG capsule, TAKE 1 CAPSULE DAILY, Disp: 90 capsule, Rfl: 1    Oxymetazoline HCl (Nasal Spray) 0.05 % solution, As Needed., Disp: , Rfl:     sodium bicarbonate 650 MG tablet, 2 qam, 1 at noon, and 2 qpm, Disp: 450 tablet, Rfl: 3    spironolactone (ALDACTONE) 25 MG tablet, Take 1 tablet by mouth Daily., Disp: 90 tablet, Rfl: 2    tamsulosin (FLOMAX) 0.4 MG capsule 24 hr capsule, Take 1 capsule by mouth Every Night., Disp: 90 capsule, Rfl: 3    valsartan (DIOVAN) 320 MG tablet, Take 1 tablet by mouth Daily., Disp: , Rfl:     vitamin D (ERGOCALCIFEROL) 1.25 MG (26098 UT) capsule capsule, Take 1 capsule by mouth 1 (One) Time Per Week. (Patient taking differently: Take 1 capsule by mouth 1 (One) Time Per Week. ON MONDAYS), Disp: 12 capsule, Rfl: 3    Budeson-Glycopyrrol-Formoterol (Breztri Aerosphere)  "160-9-4.8 MCG/ACT aerosol inhaler, Inhale 2 puffs 2 (Two) Times a Day., Disp: 1 each, Rfl: 3    ciprofloxacin-dexAMETHasone (Ciprodex) 0.3-0.1 % otic suspension, Administer 4 drops to the right ear 2 (Two) Times a Day., Disp: 7.5 mL, Rfl: 1    magnesium chloride ER 64 MG DR tablet, Take 143 mg by mouth Daily., Disp: , Rfl:     phenylephrine (MAYELA-SYNEPHRINE) 1 % nasal spray, 1 spray into the nostril(s) as directed by provider Daily As Needed (ear bleeding)., Disp: 1 each, Rfl: 0    ALLERGIES  Allergies   Allergen Reactions    Ondansetron Anaphylaxis and GI Intolerance    Zofran [Ondansetron Hcl] Anaphylaxis    Lortab [Hydrocodone-Acetaminophen] Other (See Comments) and Hallucinations     CLOSTROPHOBIC    Allopurinol Other (See Comments)     Pain on right side         Respiratory Exam    VITAL SIGNS:   /47   Pulse (!) 42   Temp 98 °F (36.7 °C) (Oral)   Resp 16   Ht 182.9 cm (72\")   Wt 68 kg (150 lb)   SpO2 98%   BMI 20.34 kg/m²     Constitutional: Patient is alert and in no distress.  Patient with generalized body discomfort.    ENT: There is a normal pharynx with no acute erythema or exudate and oral mucosa is moist.  Nose is clear with no drainage.  Tympanic membranes intact and non-erythemic    Cardiovascular: S1-S2 regular rate and rhythm.  No murmur, rubs or gallops.    Respiratory: Bilateral decreased breath sounds in both lung bases with coarse rhonchi in both lung fields including the bases    Abdomen: Soft, nontender.  Bowel sounds are normal in all 4 quadrants.  There is no rebound or guarding noted.  There is no abdominal distention or hepatosplenomegaly..    Genitourinary: Patient is voiding appropriately.    Integument: No acute lesions noted.  Color appears to be normal.    Fernandez Coma Scale: Total score 15    Neurological: Patient is alert and oriented x4 and no acute findings noted.  Speech is fluent and cognition is normal.  No evidence of acute CVA.  Cranial nerves II through XII " intact.  Patient with normal motor function as well as reflexes and sensation.    Psychiatric: Normal affect and mood      RADIOLOGY/PROCEDURES    XR Chest 1 View   Final Result       Small to moderate right and trace left pleural effusions with mild   interstitial opacities, suggestive of pulmonary edema.               This report was signed and finalized on 1/10/2025 1:57 PM by Jake Briseno.                FUTURE APPOINTMENTS     Future Appointments   Date Time Provider Department Center   1/23/2025  9:00 AM  [facility] PAD CCLAB PAD   1/23/2025  9:15 AM Becky Camacho APRN [facility] ONC PAD PAD   1/23/2025  9:30 AM CHAIR 24 BH PAD OP INFU ONC BH PAD OIONC PAD   2/10/2025  9:00 AM PAD US 1 BH PAD US PAD   2/21/2025  9:00 AM [facility] PC VSQ PAD   2/25/2025  9:00 AM Nathan Zimmer MD [facility] PC VSQ PAD   3/3/2025 10:00 AM Jagdeep Moore APRN [facility] CD PAD PAD   11/4/2025 10:00 AM PAD US NIVAS CART 1  PAD NIVAS PAD   11/4/2025 11:00 AM PAD US NIVAS CART 1  PAD NIVAS PAD   11/6/2025 10:15 AM Elena Jon APRN [facility] VS PAD PAD          EKG (reviewed and interpreted by me):  Normal sinus rhythm. No acute ischemia.  Sinus bradycardia with a ventricular rate of 43 bpm with no acute ST segment elevation or depression noted        COURSE & MEDICAL DECISION MAKING     Patient's partial differential diagnosis can include:    congestive heart failure, Pneumonia, pneumonitis, viral pneumonia,bronchitis, bronchiolitis, COPD exacerbation, asthma exacerbation, pulmonary embolism, pneumothorax, pleural effusion, pulmonary edema, empyema, atelectasis, and others      Due to patient's elevation of his BNP and chest x-ray that shows pulmonary edema I do feel the patient is short of breath secondary to acute congestive heart failure.  His echo shows that he has a normally left ventricular ejection fraction but a high-grade diastolic dysfunction of the left ventricle.  Consideration for given  Lasix or Bumex for this pulmonary edema should be given.  However close monitoring of the patient's renal failure should be performed.  Will call the hospitalist nurse practitioner Katherine Camarena for admission.    Discussed case with Katherine Camarena nurse practitioner with hospitalist group.  Patient will be admitted to Dr. Miramontes      Patient's level of risk: Moderate        CRITICAL CARE    CRITICAL CARE: No    CRITICAL CARE TIME: None    Also Old charts were reviewed per [employer] EMR.  Pertinent details are summarized above.  All laboratory, radiologic, and EKG studies that were performed in the Emergency Department were a necessary part of the evaluation needed to exclude unstable or emergent medical conditions:     Patient was hemodynamically and neurologically stable in the ED.   Pertinent studies were reviewed as above.     Recent Results (from the past 24 hours)   Blood Gas, Arterial With Co-Ox    Collection Time: 01/10/25  1:47 PM    Specimen: Arterial Blood   Result Value Ref Range    Site Right Brachial     Héctor's Test Positive     pH, Arterial 7.351 7.350 - 7.450 pH units    pCO2, Arterial 32.3 (L) 35.0 - 45.0 mm Hg    pO2, Arterial 64.5 (L) 83.0 - 108.0 mm Hg    HCO3, Arterial 17.8 (L) 20.0 - 26.0 mmol/L    Base Excess, Arterial -7.0 (L) 0.0 - 2.0 mmol/L    O2 Saturation, Arterial 92.5 (L) 94.0 - 99.0 %    Hemoglobin, Blood Gas 9.1 (L) 14 - 18 g/dL    Hematocrit, Blood Gas 27.9 (L) 38.0 - 51.0 %    Oxyhemoglobin 89.5 (L) 94 - 99 %    Methemoglobin 0.40 0.00 - 3.00 %    Carboxyhemoglobin 2.8 0 - 5 %    Temperature 37.0     Sodium, Arterial 139 136 - 145 mmol/L    Potassium, Arterial 3.8 3.5 - 5.2 mmol/L    Barometric Pressure for Blood Gas 750 mmHg    Modality Room Air     Ventilator Mode NA     Collected by [id number]     pH, Temp Corrected 7.351 7.350 - 7.450 pH Units    pCO2, Temperature Corrected 32.3 (L) 35 - 45 mm Hg    pO2, Temperature Corrected 64.5 (L) 83 - 108 mm Hg   ECG 12 Lead Dyspnea    Collection  Time: 01/10/25  2:21 PM   Result Value Ref Range    QT Interval 592 ms    QTC Interval 500 ms   Respiratory Panel PCR w/COVID-19(SARS-CoV-2) TOSIN/JOVAN/ANTONIO/PAD/COR/CHARLENE In-House, NP Swab in UTM/VTM, 2 HR TAT - Swab, Nasopharynx    Collection Time: 01/10/25  2:34 PM    Specimen: Nasopharynx; Swab   Result Value Ref Range    ADENOVIRUS, PCR Not Detected Not Detected    Coronavirus 229E Not Detected Not Detected    Coronavirus HKU1 Not Detected Not Detected    Coronavirus NL63 Not Detected Not Detected    Coronavirus OC43 Not Detected Not Detected    COVID19 Not Detected Not Detected - Ref. Range    Human Metapneumovirus Not Detected Not Detected    Human Rhinovirus/Enterovirus Not Detected Not Detected    Influenza A PCR Not Detected Not Detected    Influenza B PCR Not Detected Not Detected    Parainfluenza Virus 1 Not Detected Not Detected    Parainfluenza Virus 2 Not Detected Not Detected    Parainfluenza Virus 3 Not Detected Not Detected    Parainfluenza Virus 4 Not Detected Not Detected    RSV, PCR Not Detected Not Detected    Bordetella pertussis pcr Not Detected Not Detected    Bordetella parapertussis PCR Not Detected Not Detected    Chlamydophila pneumoniae PCR Not Detected Not Detected    Mycoplasma pneumo by PCR Not Detected Not Detected   Comprehensive Metabolic Panel    Collection Time: 01/10/25  3:08 PM    Specimen: Blood   Result Value Ref Range    Glucose 85 65 - 99 mg/dL    BUN 58 (H) 8 - 23 mg/dL    Creatinine 3.41 (H) 0.76 - 1.27 mg/dL    Sodium 139 136 - 145 mmol/L    Potassium 4.4 3.5 - 5.2 mmol/L    Chloride 104 98 - 107 mmol/L    CO2 17.0 (L) 22.0 - 29.0 mmol/L    Calcium 8.6 8.6 - 10.5 mg/dL    Total Protein 6.1 6.0 - 8.5 g/dL    Albumin 3.5 3.5 - 5.2 g/dL    ALT (SGPT) 12 1 - 41 U/L    AST (SGOT) 26 1 - 40 U/L    Alkaline Phosphatase 142 (H) 39 - 117 U/L    Total Bilirubin 0.4 0.0 - 1.2 mg/dL    Globulin 2.6 gm/dL    A/G Ratio 1.3 g/dL    BUN/Creatinine Ratio 17.0 7.0 - 25.0    Anion Gap 18.0 (H)  5.0 - 15.0 mmol/L    eGFR 17.9 (L) >60.0 mL/min/1.73   Protime-INR    Collection Time: 01/10/25  3:08 PM    Specimen: Blood   Result Value Ref Range    Protime 18.1 (H) 11.8 - 14.8 Seconds    INR 1.41 (H) 0.91 - 1.09   aPTT    Collection Time: 01/10/25  3:08 PM    Specimen: Blood   Result Value Ref Range    PTT 33.6 24.5 - 36.0 seconds   BNP    Collection Time: 01/10/25  3:08 PM    Specimen: Blood   Result Value Ref Range    proBNP 32,562.0 (H) 0.0 - 1,800.0 pg/mL   High Sensitivity Troponin T    Collection Time: 01/10/25  3:08 PM    Specimen: Blood   Result Value Ref Range    HS Troponin T 65 (C) <22 ng/L   Lactic Acid, Plasma    Collection Time: 01/10/25  3:08 PM    Specimen: Blood   Result Value Ref Range    Lactate 0.9 0.5 - 2.0 mmol/L   Procalcitonin    Collection Time: 01/10/25  3:08 PM    Specimen: Blood   Result Value Ref Range    Procalcitonin 0.26 (H) 0.00 - 0.25 ng/mL   Magnesium    Collection Time: 01/10/25  3:08 PM    Specimen: Blood   Result Value Ref Range    Magnesium 2.2 1.6 - 2.4 mg/dL   CBC Auto Differential    Collection Time: 01/10/25  3:08 PM    Specimen: Blood   Result Value Ref Range    WBC 5.17 3.40 - 10.80 10*3/mm3    RBC 2.77 (L) 4.14 - 5.80 10*6/mm3    Hemoglobin 9.0 (L) 13.0 - 17.7 g/dL    Hematocrit 28.6 (L) 37.5 - 51.0 %    .2 (H) 79.0 - 97.0 fL    MCH 32.5 26.6 - 33.0 pg    MCHC 31.5 31.5 - 35.7 g/dL    RDW 16.4 (H) 12.3 - 15.4 %    RDW-SD 62.6 (H) 37.0 - 54.0 fl    MPV 10.9 6.0 - 12.0 fL    Platelets 80 (L) 140 - 450 10*3/mm3    Neutrophil % 76.9 (H) 42.7 - 76.0 %    Lymphocyte % 9.7 (L) 19.6 - 45.3 %    Monocyte % 7.9 5.0 - 12.0 %    Eosinophil % 3.5 0.3 - 6.2 %    Basophil % 1.2 0.0 - 1.5 %    Immature Grans % 0.8 (H) 0.0 - 0.5 %    Neutrophils, Absolute 3.98 1.70 - 7.00 10*3/mm3    Lymphocytes, Absolute 0.50 (L) 0.70 - 3.10 10*3/mm3    Monocytes, Absolute 0.41 0.10 - 0.90 10*3/mm3    Eosinophils, Absolute 0.18 0.00 - 0.40 10*3/mm3    Basophils, Absolute 0.06 0.00 - 0.20  10*3/mm3    Immature Grans, Absolute 0.04 0.00 - 0.05 10*3/mm3    nRBC 0.0 0.0 - 0.2 /100 WBC       The patient received:  Medications   ipratropium-albuterol (DUO-NEB) nebulizer solution 3 mL (3 mL Nebulization Given 1/10/25 1358)   methylPREDNISolone sodium succinate (SOLU-Medrol) injection 125 mg (125 mg Intravenous Given 1/10/25 1511)   magnesium sulfate 2g/50 mL (PREMIX) infusion (0 g Intravenous Stopped 1/10/25 1546)            ED Disposition       ED Disposition   Decision to Admit    Condition   --    Comment   Level of Care: Telemetry [5]   Diagnosis: Congestive heart failure [998721]   Admitting Physician: JUANPABLO PANTOJA [[id number]]   Attending Physician: JUANPABLO PANTOJA [[id number]]   Certification: I Certify That Inpatient Hospital Services Are Medically Necessary For Greater Than 2 Midnights                     Dragon disclaimer:  Part of this note may be an electronic transcription/translation of spoken language to printed text using the Dragon Dictation System.     I have reviewed the patient’s prescription history via a prescription monitoring program.  This information is consistent with my knowledge of the patient’s controlled substance use history.    FINAL IMPRESSION   Diagnosis Plan   1. Acute congestive heart failure with left ventricular diastolic dysfunction        2. Chronic renal failure, stage 4 (severe)        3. Hypertension, unspecified type        4. Bradycardia        5. Nonproductive cough        6. Elevated troponin              MD Evan Ho Jr, Thomas Mark Jr., MD  01/10/25 [id number]       Ricardo Gordon Jr., MD  01/10/25 1608       Ricardo Gordon Jr., MD  01/10/25 1604

## 2025-01-10 NOTE — H&P
[facility] Medicine Services  HISTORY AND PHYSICAL    Date of Admission: 1/10/2025  Primary Care Physician: Nathan Zimmer MD    Subjective   Primary Historian: Patient    Chief Complaint: Worsening shortness of breathing    History of Present Illness  Ricardo Hugo is a 74-year-old male with a past medical history of diastolic heart failure, coronary artery disease with history of bypass, Crohn's disease, chronic kidney disease stage IV, hypertension, anemia of chronic disease, please see below for complete list.  Patient presents to [facility] ED with complaints of shortness of breathing.  He reported home oxygen saturation in the mid 80s with ambulation.  At rest in the emergency department he was 88/89% on room air.  Patient does not normally wear oxygen.  Currently he is on 2 L.  Patient treated for pneumonia per PCP with Augmentin and azithromycin 12/24/2024 based on x-ray, right lower lobe.  Due to ongoing symptoms he was started on cefdinir 300 mg daily 1/8/2025 x 5 doses.  ER workup reveals acute heart failure.  pCO2 64.5, pH 7.35, pCO2 32.3, saturation 92.5%, troponins 65 followed with 62 with a -5 delta, proBNP 32,562, creatinine 3.4 at baseline, hemoglobin 9 at baseline and platelets 80,000.  Blood cultures obtained. Respiratory panel negative.  Chest x-ray revealed Small to moderate right and trace left pleural effusions with mild interstitial opacities, suggestive of pulmonary edema.  EKG and monitor revealed bradycardia low 40s at time of my assessment 41.  Patient states he is chronically low however I will hold beta-blocker for now.  Condition is stable.  Patient is extremely weak.  He denies chest pain.  He states shortness of breathing has improved with oxygen.  He is admitted for further evaluation treatment.      Results for orders placed during the hospital encounter of 10/05/22    Adult Transthoracic Echo Complete w/ Color, Spectral and Contrast if  necessary per protocol    Interpretation Summary  · Left ventricular wall thickness is consistent with mild concentric hypertrophy. Sigmoid-shaped ventricular septum is present.  · Left ventricular ejection fraction appears to be 61 - 65%.  · Left ventricular diastolic function is consistent with (grade II w/high LAP) pseudonormalization.  · There are myxomatous changes of the mitral valve apparatus present with mild mitral annular calcification. There is mild mitral regurgitation.  · The left atrial cavity is moderately dilated.  · The right ventricle is mildly dilated with normal systolic function.  · The right atrial cavity is borderline dilated.  · Mild to moderate tricuspid valve regurgitation.  · Mild pulmonary hypertension with estimated right ventricular systolic pressure 45 mmHg.     Review of Systems   Otherwise complete ROS reviewed and negative except as mentioned in the HPI.    Past Medical History:   Past Medical History:   Diagnosis Date    3-vessel CAD 8/11/2020    Allergic rhinitis     Anxiety disorder 4/27/2020    Arthritis     Asymmetrical sensorineural hearing loss 6/28/2017    Atherosclerosis of native artery of both lower extremities with intermittent claudication 7/18/2019    Avascular necrosis of femoral head, left 07/11/2020    right hip after surgery    Carotid stenosis     Chronic mucoid otitis media     Chronic rhinitis     Coronary artery disease     HEART BYPASS 2004    Crohn's disease of large intestine with other complication 7/30/2020    Chronic diarrhea Colonoscopy July 2020 revealed mild patchy scattered hemosiderin staining with inflammation more so in rectosigmoid area.  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    Iron deficiency anemia 7/14/2020    Mixed hearing loss of left ear     PAD (peripheral artery disease) 8/11/2020    Perianal abscess     Pernicious anemia 08/17/2020    took shots but never diagnosed with b12 deficiency    Personal history of alcoholism 08/11/2020    quit drinking in 2013    Prostatic hypertrophy 8/11/2020    Sensorineural hearing loss     Sepsis with acute renal failure 9/15/2020    Shortness of breath 5/27/2021    Tinnitus     Ventricular tachycardia, nonsustained 7/14/2020    Weight loss 7/11/2020     Past Surgical History:  Past Surgical History:   Procedure Laterality Date    ARTERY SURGERY  2021    right side on neck    CAROTID ENDARTERECTOMY Right 5/10/2021    Procedure: RIGHT CAROTID ENDARTERECTOMY WITH EEG;  Surgeon: Gil Pineda DO;  Location: [facility] OR ;  Service: Vascular;  Laterality: Right;    COLONOSCOPY N/A 7/2/2020    Procedure: COLONOSCOPY WITH ANESTHESIA;  Surgeon: Adrien Brewster MD;  Location: [facility] ENDOSCOPY;  Service: Gastroenterology;  Laterality: N/A;  pre op: diarrhea  post op: polyps  PCP: Joe Velasco MD    COLONOSCOPY N/A 10/13/2020    Procedure: COLONOSCOPY WITH ANESTHESIA;  Surgeon: Adrien Brewster MD;  Location: [facility] ENDOSCOPY;  Service: Gastroenterology;  Laterality: N/A;  Pre: Chronic Diarrhea, Crohn's  Post: AVM  Dr. Neftali Velasco  CO2 Inflation Used    COLONOSCOPY N/A 12/8/2023    Procedure: COLONOSCOPY WITH ANESTHESIA;  Surgeon: Adrien Brewster MD;  Location: [facility] ENDOSCOPY;  Service: Gastroenterology;  Laterality: N/A;  pre chrone's disease  post sub optimal prep, polyp, chrone's      CORONARY ARTERY BYPASS GRAFT  2003    x3    ENDOSCOPY N/A 11/2/2021    Procedure: ESOPHAGOGASTRODUODENOSCOPY WITH ANESTHESIA;  Surgeon: Bridger Bell MD;  Location: [facility] ENDOSCOPY;  Service: Gastroenterology;  Laterality: N/A;  pre anemia;gi bleed  post  gi  bleed;schatski ring  Dr. ERIC Velasco    ENDOSCOPY N/A 10/10/2023    Procedure: ESOPHAGOGASTRODUODENOSCOPY WITH ANESTHESIA;  Surgeon: Adrien Brewster MD;  Location: [facility] ENDOSCOPY;  Service: Gastroenterology;  Laterality: N/A;  preop; anemia  postop esophagitis ; R/O barretts   PCP Randall Beata    EYE SURGERY Bilateral     catorac    INCISION AND DRAINAGE PERIRECTAL ABSCESS N/A 3/3/2017    Procedure: INCISION AND DRAINAGE OF JEET ANAL ABSCESS;  Surgeon: Lynette Smith MD;  Location: [facility] OR;  Service:     INGUINAL HERNIA REPAIR Bilateral 6/27/2023    Procedure: INGUINAL HERNIA BILATERAL REPAIR LAPAROSCOPIC WITH DAVINCI ROBOT WITH MESH;  Surgeon: Tahira Rivera MD;  Location: [facility] OR;  Service: Robotics - DaVinci;  Laterality: Bilateral;    MYRINGOTOMY W/ TUBES Left 04/17/2017    06/10/2016    TONSILLECTOMY      TOTAL HIP ARTHROPLASTY Right 2006     Social History:  reports that he quit smoking about 11 years ago. His smoking use included cigarettes. He started smoking about 37 years ago. He has a 12.9 pack-year smoking history. He has been exposed to tobacco smoke. He has never used smokeless tobacco. He reports that he does not currently use alcohol. He reports that he does not use drugs.    Family History: family history includes Breast cancer in his mother; Dementia in his father; Glaucoma in his father; No Known Problems in his daughter.       Allergies:  Allergies   Allergen Reactions    Ondansetron Anaphylaxis and GI Intolerance    Zofran [Ondansetron Hcl] Anaphylaxis    Lortab [Hydrocodone-Acetaminophen] Other (See Comments) and Hallucinations     CLOSTROPHOBIC    Allopurinol Other (See Comments)     Pain on right side       Medications:  Prior to Admission medications    Medication Sig Start Date End Date Taking? Authorizing Provider   albuterol sulfate  (90 Base) MCG/ACT inhaler USE 2 INHALATIONS FOUR TIMES A DAY AS NEEDED FOR SHORTNESS OF AIR OR WHEEZING 7/18/24  Yes Nathan Zimmer  MD Randall   ALPRAZolam (XANAX) 0.25 MG tablet TAKE 1 TABLET BY MOUTH EVERY NIGHT  Patient taking differently: Take 1 tablet by mouth At Night As Needed. 10/25/24  Yes Nathan Zimmer MD   aspirin (aspirin) 81 MG EC tablet Take  by mouth Daily.   Yes Twyla Guevara MD   atorvastatin (LIPITOR) 10 MG tablet Take 1 tablet by mouth Daily. 9/4/24  Yes Nathan Zimmer MD   azelastine (ASTELIN) 0.1 % nasal spray USE 1 SPRAY IN EACH NOSTRIL TWICE A DAY AS NEEDED 2/8/22  Yes Tahira Mendez APRN   carvedilol (COREG) 25 MG tablet Take 1 tablet by mouth 2 (Two) Times a Day With Meals. 11/27/24  Yes Nathan Zimmer MD   cefdinir (OMNICEF) 300 MG capsule Take 1 capsule by mouth Daily. 1/8/25  Yes Nathan Zimmer MD   cholestyramine (QUESTRAN) 4 g packet MIX AND DRINK 1 PACKET DAILY 10/17/24  Yes Adrien Brewster MD   cloNIDine (CATAPRES) 0.3 MG tablet Take 1 tablet by mouth 3 (Three) Times a Day. 8/19/24  Yes Nathan Zimmer MD   cloNIDine (CATAPRES-TTS) 0.2 MG/24HR patch Place 1 patch on the skin as directed by provider 1 (One) Time Per Week. THURSDAYS   Yes Twyla Guevara MD   clopidogrel (PLAVIX) 75 MG tablet TAKE 1 TABLET DAILY 7/1/24  Yes Elena Jon APRN   Copper Gluconate (Copper Caps) 2 MG capsule Take 2 mg by mouth Daily.   Yes Twyla Guevara MD   diphenhydrAMINE HCl, Sleep, (ZzzQuil) 25 MG capsule Take  by mouth Every Night.   Yes Twyla Guevara MD   docusate sodium (COLACE) 100 MG capsule Take 1 capsule by mouth 3 times a day.   Yes Twyla Guevara MD   fexofenadine (ALLEGRA) 180 MG tablet Take 1 tablet by mouth Daily.   Yes Twyla Guevara MD   fluticasone (FLONASE) 50 MCG/ACT nasal spray USE 2 SPRAYS INTO THE  NOSTRIL(S) AS DIRECTED BY PROVIDER DAILY AS NEEDED FOR RHINITIS 3/7/24  Yes Nathan Zimmer MD   furosemide (Lasix) 20 MG tablet Take 1 tablet by mouth 3 (Three) Times a Day. 9/24/24  Yes Nathan Zimmer MD   hydrALAZINE  (APRESOLINE) 100 MG tablet Take 1 tablet by mouth 3 (Three) Times a Day. 100mg daily 8/1/23  Yes Nathan Zimmer MD   levothyroxine (Synthroid) 75 MCG tablet Take 1 tablet by mouth Daily. 5/1/24  Yes Nathan Zimmer MD   Magnesium Cl-Calcium Carbonate (SLOW-MAG PO) Take 143 mg by mouth Daily.   Yes Twyla Guevara MD   Melatonin 10 MG capsule Take 3 capsules by mouth Every Night.   Yes Twyla Guevara MD   mesalamine (APRISO) 0.375 g 24 hr capsule TAKE 4 CAPSULES DAILY 11/27/24  Yes Adrien Brewster MD   montelukast (SINGULAIR) 10 MG tablet TAKE 1 TABLET EVERY NIGHT 5/31/24  Yes Nathan Zimmer MD   Multiple Vitamins-Minerals (PRESERVISION/LUTEIN) capsule Take 1 capsule by mouth 2 (two) times a day.   Yes Twyla Guevara MD   NIFEdipine CC (ADALAT CC) 90 MG 24 hr tablet TAKE 1 TABLET DAILY 11/27/24  Yes Nathan Zimmer MD   omeprazole (priLOSEC) 20 MG capsule TAKE 1 CAPSULE DAILY 2/8/22  Yes Tahira Mendez APRN   Oxymetazoline HCl (Nasal Spray) 0.05 % solution As Needed. 8/11/22  Yes Twyla Guevara MD   sodium bicarbonate 650 MG tablet 2 qam, 1 at noon, and 2 qpm 5/9/23  Yes Nathan Zimmer MD   spironolactone (ALDACTONE) 25 MG tablet Take 1 tablet by mouth Daily. 9/24/24  Yes Nathan Zimmer MD   tamsulosin (FLOMAX) 0.4 MG capsule 24 hr capsule Take 1 capsule by mouth Every Night. 1/3/25  Yes Ruthie Parra APRN   valsartan (DIOVAN) 320 MG tablet Take 1 tablet by mouth Daily. 5/17/23  Yes Nathan Zimmer MD   vitamin D (ERGOCALCIFEROL) 1.25 MG (02490 UT) capsule capsule Take 1 capsule by mouth 1 (One) Time Per Week.  Patient taking differently: Take 1 capsule by mouth 1 (One) Time Per Week. ON MONDAYS 5/31/24  Yes Nathan Zimmer MD   Budeson-Glycopyrrol-Formoterol (Breztri Aerosphere) 160-9-4.8 MCG/ACT aerosol inhaler Inhale 2 puffs 2 (Two) Times a Day. 1/3/25   Ruthie Parra APRN   ciprofloxacin-dexAMETHasone (Ciprodex) 0.3-0.1  "% otic suspension Administer 4 drops to the right ear 2 (Two) Times a Day. 11/19/24   Nathan Zimmer MD   magnesium chloride ER 64 MG DR tablet Take 143 mg by mouth Daily.    Provider, MD Twyla   phenylephrine (MAYELA-SYNEPHRINE) 1 % nasal spray 1 spray into the nostril(s) as directed by provider Daily As Needed (ear bleeding). 12/22/23   Ruthie Parra APRN     I have utilized all available immediate resources to obtain, update, or review the patient's current medications (including all prescriptions, over-the-counter products, herbals, cannabis/cannabidiol products, and vitamin/mineral/dietary (nutritional) supplements).    Objective     Vital Signs: /54   Pulse (!) 42   Temp 98 °F (36.7 °C) (Oral)   Resp 16   Ht 182.9 cm (72\")   Wt 68 kg (150 lb)   SpO2 99%   BMI 20.34 kg/m²   Physical Exam  Vitals reviewed.   Constitutional:       Appearance: He is ill-appearing.      Comments: Frail   HENT:      Head: Normocephalic and atraumatic.      Mouth/Throat:      Mouth: Mucous membranes are moist.      Pharynx: Oropharynx is clear.   Eyes:      Extraocular Movements: Extraocular movements intact.      Conjunctiva/sclera: Conjunctivae normal.   Cardiovascular:      Rate and Rhythm: Regular rhythm. Bradycardia present.   Pulmonary:      Comments: Severe coarseness throughout anteriorly and posteriorly, bilateral lung fields  Abdominal:      General: There is no distension.      Palpations: Abdomen is soft.   Musculoskeletal:      Cervical back: Normal range of motion and neck supple.      Right lower leg: No edema.      Left lower leg: No edema.   Skin:     General: Skin is warm and dry.   Neurological:      General: No focal deficit present.      Mental Status: He is alert and oriented to person, place, and time.   Psychiatric:         Mood and Affect: Mood normal.         Behavior: Behavior normal.        Results Reviewed:  Lab Results (last 24 hours)       Procedure Component Value Units " Date/Time    High Sensitivity Troponin T 1Hr [[id number]]  (Abnormal) Collected: 01/10/25 1613    Specimen: Blood Updated: 01/10/25 1641     HS Troponin T 62 ng/L      Troponin T Numeric Delta -3 ng/L      Troponin T % Delta -5 %     Narrative:      High Sensitive Troponin T Reference Range:  <14.0 ng/L- Negative Female for AMI  <22.0 ng/L- Negative Male for AMI  >=14 - Abnormal Female indicating possible myocardial injury.  >=22 - Abnormal Male indicating possible myocardial injury.   Clinicians would have to utilize clinical acumen, EKG, Troponin, and serial changes to determine if it is an Acute Myocardial Infarction or myocardial injury due to an underlying chronic condition.         Respiratory Panel PCR w/COVID-19(SARS-CoV-2) TOSIN/JOVAN/ANTONIO/PAD/COR/CHARLENE In-House, NP Swab in UTM/VTM, 2 HR TAT - Swab, Nasopharynx [[id number]]  (Normal) Collected: 01/10/25 1434    Specimen: Swab from Nasopharynx Updated: 01/10/25 1553     ADENOVIRUS, PCR Not Detected     Coronavirus 229E Not Detected     Coronavirus HKU1 Not Detected     Coronavirus NL63 Not Detected     Coronavirus OC43 Not Detected     COVID19 Not Detected     Human Metapneumovirus Not Detected     Human Rhinovirus/Enterovirus Not Detected     Influenza A PCR Not Detected     Influenza B PCR Not Detected     Parainfluenza Virus 1 Not Detected     Parainfluenza Virus 2 Not Detected     Parainfluenza Virus 3 Not Detected     Parainfluenza Virus 4 Not Detected     RSV, PCR Not Detected     Bordetella pertussis pcr Not Detected     Bordetella parapertussis PCR Not Detected     Chlamydophila pneumoniae PCR Not Detected     Mycoplasma pneumo by PCR Not Detected    Narrative:      In the setting of a positive respiratory panel with a viral infection PLUS a negative procalcitonin without other underlying concern for bacterial infection, consider observing off antibiotics or discontinuation of antibiotics and continue supportive care. If the respiratory panel is positive for  "atypical bacterial infection (Bordetella pertussis, Chlamydophila pneumoniae, or Mycoplasma pneumoniae), consider antibiotic de-escalation to target atypical bacterial infection.    Procalcitonin [[id number]]  (Abnormal) Collected: 01/10/25 1508    Specimen: Blood Updated: 01/10/25 1546     Procalcitonin 0.26 ng/mL     Narrative:      As a Marker for Sepsis (Non-Neonates):    1. <0.5 ng/mL represents a low risk of severe sepsis and/or septic shock.  2. >2 ng/mL represents a high risk of severe sepsis and/or septic shock.    As a Marker for Lower Respiratory Tract Infections that require antibiotic therapy:    PCT on Admission    Antibiotic Therapy       6-12 Hrs later    >0.5                Strongly Recommended  >0.25 - <0.5        Recommended   0.1 - 0.25          Discouraged              Remeasure/reassess PCT  <0.1                Strongly Discouraged     Remeasure/reassess PCT    As 28 day mortality risk marker: \"Change in Procalcitonin Result\" (>80% or <=80%) if Day 0 (or Day 1) and Day 4 values are available. Refer to http://www.[facility]pct-calculator.com    Change in PCT <=80%  A decrease of PCT levels below or equal to 80% defines a positive change in PCT test result representing a higher risk for 28-day all-cause mortality of patients diagnosed with severe sepsis for septic shock.    Change in PCT >80%  A decrease of PCT levels of more than 80% defines a negative change in PCT result representing a lower risk for 28-day all-cause mortality of patients diagnosed with severe sepsis or septic shock.       Comprehensive Metabolic Panel [[id number]]  (Abnormal) Collected: 01/10/25 1508    Specimen: Blood Updated: 01/10/25 1543     Glucose 85 mg/dL      BUN 58 mg/dL      Creatinine 3.41 mg/dL      Sodium 139 mmol/L      Potassium 4.4 mmol/L      Comment: Slight hemolysis detected by analyzer. Result may be falsely elevated.        Chloride 104 mmol/L      CO2 17.0 mmol/L      Calcium 8.6 mg/dL      Total Protein 6.1 " g/dL      Albumin 3.5 g/dL      ALT (SGPT) 12 U/L      AST (SGOT) 26 U/L      Comment: Slight hemolysis detected by analyzer. Result may be falsely elevated.        Alkaline Phosphatase 142 U/L      Total Bilirubin 0.4 mg/dL      Globulin 2.6 gm/dL      A/G Ratio 1.3 g/dL      BUN/Creatinine Ratio 17.0     Anion Gap 18.0 mmol/L      eGFR 17.9 mL/min/1.73     Narrative:      GFR Categories in Chronic Kidney Disease (CKD)      GFR Category          GFR (mL/min/1.73)    Interpretation  G1                     90 or greater         Normal or high (1)  G2                      60-89                Mild decrease (1)  G3a                   45-59                Mild to moderate decrease  G3b                   30-44                Moderate to severe decrease  G4                    15-29                Severe decrease  G5                    14 or less           Kidney failure          (1)In the absence of evidence of kidney disease, neither GFR category G1 or G2 fulfill the criteria for CKD.    eGFR calculation 2021 CKD-EPI creatinine equation, which does not include race as a factor    High Sensitivity Troponin T [[id number]]  (Abnormal) Collected: 01/10/25 1508    Specimen: Blood Updated: 01/10/25 1543     HS Troponin T 65 ng/L     Narrative:      High Sensitive Troponin T Reference Range:  <14.0 ng/L- Negative Female for AMI  <22.0 ng/L- Negative Male for AMI  >=14 - Abnormal Female indicating possible myocardial injury.  >=22 - Abnormal Male indicating possible myocardial injury.   Clinicians would have to utilize clinical acumen, EKG, Troponin, and serial changes to determine if it is an Acute Myocardial Infarction or myocardial injury due to an underlying chronic condition.         BNP [[id number]]  (Abnormal) Collected: 01/10/25 1508    Specimen: Blood Updated: 01/10/25 1540     proBNP 32,562.0 pg/mL     Narrative:      This assay is used as an aid in the diagnosis of individuals suspected of having heart failure. It can  be used as an aid in the diagnosis of acute decompensated heart failure (ADHF) in patients presenting with signs and symptoms of ADHF to the emergency department (ED). In addition, NT-proBNP of <300 pg/mL indicates ADHF is not likely.    Age Range Result Interpretation  NT-proBNP Concentration (pg/mL:      <50             Positive            >450                   Gray                 300-450                    Negative             <300    50-75           Positive            >900                  Gray                300-900                  Negative            <300      >75             Positive            >1800                  Gray                300-1800                  Negative            <300    Magnesium [[id number]]  (Normal) Collected: 01/10/25 1508    Specimen: Blood Updated: 01/10/25 1540     Magnesium 2.2 mg/dL     Lactic Acid, Plasma [[id number]]  (Normal) Collected: 01/10/25 1508    Specimen: Blood Updated: 01/10/25 1540     Lactate 0.9 mmol/L     Protime-INR [[id number]]  (Abnormal) Collected: 01/10/25 1508    Specimen: Blood Updated: 01/10/25 1529     Protime 18.1 Seconds      INR 1.41    aPTT [[id number]]  (Normal) Collected: 01/10/25 1508    Specimen: Blood Updated: 01/10/25 1529     PTT 33.6 seconds     CBC & Differential [[id number]]  (Abnormal) Collected: 01/10/25 1508    Specimen: Blood Updated: 01/10/25 1525    Narrative:      The following orders were created for panel order CBC & Differential.  Procedure                               Abnormality         Status                     ---------                               -----------         ------                     CBC Auto Differential[[id number]]        Abnormal            Final result                 Please view results for these tests on the individual orders.    CBC Auto Differential [[id number]]  (Abnormal) Collected: 01/10/25 1508    Specimen: Blood Updated: 01/10/25 1525     WBC 5.17 10*3/mm3      RBC 2.77 10*6/mm3      Hemoglobin 9.0 g/dL       Hematocrit 28.6 %      .2 fL      MCH 32.5 pg      MCHC 31.5 g/dL      RDW 16.4 %      RDW-SD 62.6 fl      MPV 10.9 fL      Platelets 80 10*3/mm3      Neutrophil % 76.9 %      Lymphocyte % 9.7 %      Monocyte % 7.9 %      Eosinophil % 3.5 %      Basophil % 1.2 %      Immature Grans % 0.8 %      Neutrophils, Absolute 3.98 10*3/mm3      Lymphocytes, Absolute 0.50 10*3/mm3      Monocytes, Absolute 0.41 10*3/mm3      Eosinophils, Absolute 0.18 10*3/mm3      Basophils, Absolute 0.06 10*3/mm3      Immature Grans, Absolute 0.04 10*3/mm3      nRBC 0.0 /100 WBC     Blood Culture - Blood, Arm, Right [[id number]] Collected: 01/10/25 1508    Specimen: Blood from Arm, Right Updated: 01/10/25 1520    Blood Culture - Blood, Arm, Right [[id number]] Collected: 01/10/25 1440    Specimen: Blood from Arm, Right Updated: 01/10/25 1451    Blood Gas, Arterial With Co-Ox [[id number]]  (Abnormal) Collected: 01/10/25 1347    Specimen: Arterial Blood Updated: 01/10/25 1347     Site Right Brachial     Héctor's Test Positive     pH, Arterial 7.351 pH units      pCO2, Arterial 32.3 mm Hg      Comment: 84 Value below reference range        pO2, Arterial 64.5 mm Hg      Comment: 84 Value below reference range        HCO3, Arterial 17.8 mmol/L      Comment: 84 Value below reference range        Base Excess, Arterial -7.0 mmol/L      Comment: 84 Value below reference range        O2 Saturation, Arterial 92.5 %      Comment: 84 Value below reference range        Hemoglobin, Blood Gas 9.1 g/dL      Comment: 84 Value below reference range        Hematocrit, Blood Gas 27.9 %      Comment: 84 Value below reference range        Oxyhemoglobin 89.5 %      Comment: 84 Value below reference range        Methemoglobin 0.40 %      Carboxyhemoglobin 2.8 %      Temperature 37.0     Sodium, Arterial 139 mmol/L      Potassium, Arterial 3.8 mmol/L      Barometric Pressure for Blood Gas 750 mmHg      Modality Room Air     Ventilator Mode NA     Collected by  [id number]     Comment: Meter: [id number]     :  Janeth Montilla, NEIDA        pH, Temp Corrected 7.351 pH Units      pCO2, Temperature Corrected 32.3 mm Hg      pO2, Temperature Corrected 64.5 mm Hg           Imaging Results (Last 24 Hours)       Procedure Component Value Units Date/Time    XR Chest 1 View [[id number]] Collected: 01/10/25 1356     Updated: 01/10/25 1400    Narrative:      EXAM: XR CHEST 1 VW- 1/10/2025 12:46 PM     HISTORY: Short of breath and wheezing cough       COMPARISON: 12/26/2021.     TECHNIQUE: Single frontal radiograph of the chest was obtained.     FINDINGS:     Support Devices: Prior CABG.     Cardiac and Mediastinal Silhouettes: Cardiomegaly.     Lungs/Pleura: Small to moderate right and trace left pleural effusions  with mild interstitial opacities. No visible pneumothorax.     Osseous structures: No acute osseous finding.     Other: None.       Impression:         Small to moderate right and trace left pleural effusions with mild  interstitial opacities, suggestive of pulmonary edema.           This report was signed and finalized on 1/10/2025 1:57 PM by Jake Briseno.             Assessment / Plan   Assessment:   Active Hospital Problems    Diagnosis     **Acute on chronic diastolic congestive heart failure     Acute respiratory failure with hypoxia     Bradycardia     History of pneumonia, current treatment with cefdinir     CKD (chronic kidney disease) stage 4, GFR 15-29 ml/min     Essential hypertension        Treatment Plan  The patient will be admitted to Dr. Miramontes's service here at [facility].     1.  Acute on chronic diastolic heart failure, initiate heart failure pathway, Lasix 40 mg twice daily for now, echocardiogram in a.m.    2.  Acute respiratory failure with hypoxia, supplemental oxygen, ABGs as needed, incentive spirometry, CT of the chest without contrast in a.m.    3.  Bradycardia, hold home beta-blocker, routine telemetry orders, continuous  cardiac monitoring    4.  Recent diagnosis pneumonia currently being treated with cefdinir previously 12/24/2024 treated with Augmentin and azithromycin, CT of the chest in a.m. without contrast, hold cefdinir for now    5.  CKD stage IV, BMP in a.m. monitor closely with need for diuresis, consider nephrology consult if kidney function worsens    6.  Essential hypertension, continue multiple home antihypertensive agents, monitor vital signs per protocol    7.  DVT prophylaxis with SCDs, home medications reviewed and restarted as appropriate, labs in a.m., consult PT and OT for strengthening      Medical Decision Making  Number and Complexity of problems: 6  3 problems, acute, high complexity, unchanged  2 problems, chronic, high complexity, unchanged  Differential Diagnosis: None    Conditions and Status        Condition is unchanged.     [facility] Data  External documents reviewed: Epic records  Cardiac tracing (EKG, telemetry) interpretation: Reviewed  Radiology interpretation: Reviewed  Labs reviewed: Reviewed  Any tests that were considered but not ordered: No     Decision rules/scores evaluated (example [id number], [place], etc): No     Discussed with: Patient and Dr. Miramontes     Care Planning  Shared decision making: Patient and Dr. Miramontes  Code status and discussions: Full    Disposition  Social Determinants of Health that impact treatment or disposition: None  Estimated length of stay is 2+ days.     I confirmed that the patient's advanced care plan is present, code status is documented, and a surrogate decision maker is listed in the patient's medical record.     The patient's surrogate decision maker is children.     The patient was seen and examined by me on 1/10/2025 at 4:03 PM.    Electronically signed by STERLING Grant, 01/10/25, 17:03 CST.

## 2025-01-11 ENCOUNTER — APPOINTMENT (OUTPATIENT)
Dept: CT IMAGING | Facility: HOSPITAL | Age: 75
End: 2025-01-11
Payer: MEDICARE

## 2025-01-11 ENCOUNTER — APPOINTMENT (OUTPATIENT)
Dept: CARDIOLOGY | Facility: HOSPITAL | Age: 75
End: 2025-01-11
Payer: MEDICARE

## 2025-01-11 LAB
ANION GAP SERPL CALCULATED.3IONS-SCNC: 15 MMOL/L (ref 5–15)
AV MEAN PRESS GRAD SYS DOP V1V2: 10 MMHG
AV VMAX SYS DOP: 219 CM/SEC
BH CV ECHO MEAS - AO MAX PG: 19.2 MMHG
BH CV ECHO MEAS - AO ROOT DIAM: 3.2 CM
BH CV ECHO MEAS - AO V2 VTI: 51.2 CM
BH CV ECHO MEAS - AVA(I,D): 2.17 CM2
BH CV ECHO MEAS - EDV(CUBED): 149.7 ML
BH CV ECHO MEAS - EDV(MOD-SP4): 116 ML
BH CV ECHO MEAS - EF(MOD-SP4): 53.5 %
BH CV ECHO MEAS - ESV(CUBED): 34 ML
BH CV ECHO MEAS - ESV(MOD-SP4): 53.9 ML
BH CV ECHO MEAS - FS: 39 %
BH CV ECHO MEAS - IVS/LVPW: 1.28 CM
BH CV ECHO MEAS - IVSD: 1.37 CM
BH CV ECHO MEAS - LA DIMENSION: 4.7 CM
BH CV ECHO MEAS - LAT PEAK E' VEL: 9.1 CM/SEC
BH CV ECHO MEAS - LV DIASTOLIC VOL/BSA (35-75): 61.2 CM2
BH CV ECHO MEAS - LV MASS(C)D: 263.3 GRAMS
BH CV ECHO MEAS - LV MAX PG: 8.4 MMHG
BH CV ECHO MEAS - LV MEAN PG: 4 MMHG
BH CV ECHO MEAS - LV SYSTOLIC VOL/BSA (12-30): 28.4 CM2
BH CV ECHO MEAS - LV V1 MAX: 145 CM/SEC
BH CV ECHO MEAS - LV V1 VTI: 32.1 CM
BH CV ECHO MEAS - LVIDD: 5.3 CM
BH CV ECHO MEAS - LVIDS: 3.2 CM
BH CV ECHO MEAS - LVOT AREA: 3.5 CM2
BH CV ECHO MEAS - LVOT DIAM: 2.1 CM
BH CV ECHO MEAS - LVPWD: 1.07 CM
BH CV ECHO MEAS - MED PEAK E' VEL: 5.7 CM/SEC
BH CV ECHO MEAS - MR MAX PG: 156.8 MMHG
BH CV ECHO MEAS - MR MAX VEL: 626 CM/SEC
BH CV ECHO MEAS - MR MEAN PG: 113 MMHG
BH CV ECHO MEAS - MR MEAN VEL: 504 CM/SEC
BH CV ECHO MEAS - MR VTI: 240 CM
BH CV ECHO MEAS - MV A MAX VEL: 55.7 CM/SEC
BH CV ECHO MEAS - MV DEC TIME: 0.15 SEC
BH CV ECHO MEAS - MV E MAX VEL: 137 CM/SEC
BH CV ECHO MEAS - MV E/A: 2.46
BH CV ECHO MEAS - RAP SYSTOLE: 8 MMHG
BH CV ECHO MEAS - RVSP: 57 MMHG
BH CV ECHO MEAS - SV(LVOT): 111.2 ML
BH CV ECHO MEAS - SV(MOD-SP4): 62.1 ML
BH CV ECHO MEAS - SVI(LVOT): 58.6 ML/M2
BH CV ECHO MEAS - SVI(MOD-SP4): 32.8 ML/M2
BH CV ECHO MEAS - TR MAX PG: 49 MMHG
BH CV ECHO MEAS - TR MAX VEL: 351 CM/SEC
BH CV ECHO MEASUREMENTS AVERAGE E/E' RATIO: 18.51
BUN SERPL-MCNC: 63 MG/DL (ref 8–23)
BUN/CREAT SERPL: 17.9 (ref 7–25)
CALCIUM SPEC-SCNC: 8.5 MG/DL (ref 8.6–10.5)
CHLORIDE SERPL-SCNC: 107 MMOL/L (ref 98–107)
CO2 SERPL-SCNC: 17 MMOL/L (ref 22–29)
CREAT SERPL-MCNC: 3.52 MG/DL (ref 0.76–1.27)
DEPRECATED RDW RBC AUTO: 60.9 FL (ref 37–54)
EGFRCR SERPLBLD CKD-EPI 2021: 17.2 ML/MIN/1.73
ERYTHROCYTE [DISTWIDTH] IN BLOOD BY AUTOMATED COUNT: 16.1 % (ref 12.3–15.4)
GLUCOSE SERPL-MCNC: 115 MG/DL (ref 65–99)
HCT VFR BLD AUTO: 30.8 % (ref 37.5–51)
HGB BLD-MCNC: 9.7 G/DL (ref 13–17.7)
LEFT ATRIUM VOLUME INDEX: 31.1 ML/M2
LEFT ATRIUM VOLUME: 59 ML
MCH RBC QN AUTO: 32.1 PG (ref 26.6–33)
MCHC RBC AUTO-ENTMCNC: 31.5 G/DL (ref 31.5–35.7)
MCV RBC AUTO: 102 FL (ref 79–97)
PLATELET # BLD AUTO: 102 10*3/MM3 (ref 140–450)
PMV BLD AUTO: 10.2 FL (ref 6–12)
POTASSIUM SERPL-SCNC: 4.3 MMOL/L (ref 3.5–5.2)
QT INTERVAL: 592 MS
QTC INTERVAL: 500 MS
RBC # BLD AUTO: 3.02 10*6/MM3 (ref 4.14–5.8)
SODIUM SERPL-SCNC: 139 MMOL/L (ref 136–145)
WBC NRBC COR # BLD AUTO: 4.38 10*3/MM3 (ref 3.4–10.8)

## 2025-01-11 PROCEDURE — 93306 TTE W/DOPPLER COMPLETE: CPT

## 2025-01-11 PROCEDURE — 71250 CT THORAX DX C-: CPT

## 2025-01-11 PROCEDURE — 93306 TTE W/DOPPLER COMPLETE: CPT | Performed by: INTERNAL MEDICINE

## 2025-01-11 PROCEDURE — 85027 COMPLETE CBC AUTOMATED: CPT | Performed by: NURSE PRACTITIONER

## 2025-01-11 PROCEDURE — 25010000002 HYDRALAZINE PER 20 MG: Performed by: HOSPITALIST

## 2025-01-11 PROCEDURE — 25010000002 HYDRALAZINE PER 20 MG

## 2025-01-11 PROCEDURE — 25010000002 FUROSEMIDE PER 20 MG: Performed by: NURSE PRACTITIONER

## 2025-01-11 PROCEDURE — 80048 BASIC METABOLIC PNL TOTAL CA: CPT | Performed by: NURSE PRACTITIONER

## 2025-01-11 PROCEDURE — 97165 OT EVAL LOW COMPLEX 30 MIN: CPT | Performed by: OCCUPATIONAL THERAPIST

## 2025-01-11 PROCEDURE — 36415 COLL VENOUS BLD VENIPUNCTURE: CPT | Performed by: NURSE PRACTITIONER

## 2025-01-11 RX ORDER — HYDRALAZINE HYDROCHLORIDE 20 MG/ML
10 INJECTION INTRAMUSCULAR; INTRAVENOUS ONCE
Status: COMPLETED | OUTPATIENT
Start: 2025-01-11 | End: 2025-01-11

## 2025-01-11 RX ADMIN — Medication 10 ML: at 08:07

## 2025-01-11 RX ADMIN — MONTELUKAST SODIUM 10 MG: 10 TABLET, COATED ORAL at 21:42

## 2025-01-11 RX ADMIN — TAMSULOSIN HYDROCHLORIDE 0.4 MG: 0.4 CAPSULE ORAL at 21:42

## 2025-01-11 RX ADMIN — HYDRALAZINE HYDROCHLORIDE 10 MG: 20 INJECTION INTRAMUSCULAR; INTRAVENOUS at 07:05

## 2025-01-11 RX ADMIN — MESALAMINE 1.5 G: 375 CAPSULE, EXTENDED RELEASE ORAL at 08:07

## 2025-01-11 RX ADMIN — ATORVASTATIN CALCIUM 10 MG: 10 TABLET, FILM COATED ORAL at 08:06

## 2025-01-11 RX ADMIN — ASPIRIN 81 MG: 81 TABLET, COATED ORAL at 08:06

## 2025-01-11 RX ADMIN — FLUTICASONE PROPIONATE 2 SPRAY: 50 SPRAY, METERED NASAL at 08:19

## 2025-01-11 RX ADMIN — CLONIDINE HYDROCHLORIDE 0.3 MG: 0.1 TABLET ORAL at 21:42

## 2025-01-11 RX ADMIN — LEVOTHYROXINE SODIUM 75 MCG: 75 TABLET ORAL at 05:44

## 2025-01-11 RX ADMIN — HYDRALAZINE HYDROCHLORIDE 100 MG: 50 TABLET ORAL at 21:42

## 2025-01-11 RX ADMIN — NIFEDIPINE 90 MG: 60 TABLET, FILM COATED, EXTENDED RELEASE ORAL at 08:06

## 2025-01-11 RX ADMIN — HYDRALAZINE HYDROCHLORIDE 100 MG: 50 TABLET ORAL at 15:21

## 2025-01-11 RX ADMIN — PANTOPRAZOLE SODIUM 40 MG: 40 TABLET, DELAYED RELEASE ORAL at 05:44

## 2025-01-11 RX ADMIN — Medication 10 MG: at 21:42

## 2025-01-11 RX ADMIN — DOCUSATE SODIUM 100 MG: 100 CAPSULE, LIQUID FILLED ORAL at 14:03

## 2025-01-11 RX ADMIN — CETIRIZINE HYDROCHLORIDE TABLETS 10 MG: 10 TABLET, FILM COATED ORAL at 08:07

## 2025-01-11 RX ADMIN — CLONIDINE HYDROCHLORIDE 0.3 MG: 0.1 TABLET ORAL at 15:21

## 2025-01-11 RX ADMIN — VALSARTAN 320 MG: 80 TABLET, FILM COATED ORAL at 08:05

## 2025-01-11 RX ADMIN — DOCUSATE SODIUM 100 MG: 100 CAPSULE, LIQUID FILLED ORAL at 08:06

## 2025-01-11 RX ADMIN — CLOPIDOGREL BISULFATE 75 MG: 75 TABLET ORAL at 08:06

## 2025-01-11 RX ADMIN — FUROSEMIDE 40 MG: 10 INJECTION, SOLUTION INTRAVENOUS at 17:25

## 2025-01-11 RX ADMIN — HYDRALAZINE HYDROCHLORIDE 10 MG: 20 INJECTION INTRAMUSCULAR; INTRAVENOUS at 05:44

## 2025-01-11 RX ADMIN — SODIUM BICARBONATE 1300 MG: 650 TABLET ORAL at 21:43

## 2025-01-11 RX ADMIN — DOCUSATE SODIUM 100 MG: 100 CAPSULE, LIQUID FILLED ORAL at 21:42

## 2025-01-11 RX ADMIN — CLONIDINE HYDROCHLORIDE 0.3 MG: 0.1 TABLET ORAL at 08:06

## 2025-01-11 RX ADMIN — HYDRALAZINE HYDROCHLORIDE 100 MG: 50 TABLET ORAL at 10:10

## 2025-01-11 RX ADMIN — SODIUM BICARBONATE 650 MG: 650 TABLET ORAL at 14:03

## 2025-01-11 RX ADMIN — Medication 10 ML: at 21:43

## 2025-01-11 RX ADMIN — CHOLESTYRAMINE 1 PACKET: 4 POWDER, FOR SUSPENSION ORAL at 08:05

## 2025-01-11 RX ADMIN — FUROSEMIDE 40 MG: 10 INJECTION, SOLUTION INTRAVENOUS at 08:07

## 2025-01-11 RX ADMIN — SODIUM BICARBONATE 1300 MG: 650 TABLET ORAL at 08:06

## 2025-01-11 NOTE — PLAN OF CARE
Goal Outcome Evaluation:  Plan of Care Reviewed With: patient        Progress: improving  Outcome Evaluation: VSS.  SB 42-49, 1st deg BBB, down to 38, per tele.  No c/o pain.  BP has been high but has improved with meds.  Safety maintained.

## 2025-01-11 NOTE — THERAPY DISCHARGE NOTE
Acute Care - Occupational Therapy Discharge  [facility]    Patient Name: Ricardo Hugo  : 1950    MRN: [id number]                              Today's Date: 2025       Admit Date: 1/10/2025    Visit Dx:     ICD-10-CM ICD-9-CM   1. Acute congestive heart failure with left ventricular diastolic dysfunction  I50.31 428.31     428.0   2. Chronic renal failure, stage 4 (severe)  N18.4 585.4   3. Hypertension, unspecified type  I10 401.9   4. Bradycardia  R00.1 427.89   5. Nonproductive cough  R05.8 786.2   6. Elevated troponin  R79.89 790.6     Patient Active Problem List   Diagnosis    Chronic rhinitis    Essential hypertension    Resistant hypertension    Chronic diarrhea    Symptomatic anemia    Wellness examination    PAD (peripheral artery disease)    IHD (ischemic heart disease)    Carotid stenosis    Stenosis of right carotid artery    Carotid stenosis, right    Diastolic dysfunction    CKD (chronic kidney disease) stage 4, GFR 15-29 ml/min    Anemia due to chronic kidney disease    Copper deficiency    Low iron     CLL (chronic lymphocytic leukemia)    Perforation of left tympanic membrane    Mixed hyperlipidemia    Systolic murmur    Eustachian tube dysfunction, bilateral    Sensorineural hearing loss (SNHL), bilateral    Anticoagulated    Nasal septal deviation    Hypertrophy of inferior nasal turbinate    Unilateral recurrent inguinal hernia without obstruction or gangrene    Bilateral inguinal hernia without obstruction or gangrene    Sleep difficulties    Dermatitis associated with moisture    Proteinuria    Acute on chronic diastolic congestive heart failure    Acute respiratory failure with hypoxia    Bradycardia    History of pneumonia, current treatment with cefdinir     Past Medical History:   Diagnosis Date    3-vessel CAD 2020    Allergic rhinitis     Anxiety disorder 2020    Arthritis     Asymmetrical sensorineural hearing loss 2017    Atherosclerosis of native artery  of both lower extremities with intermittent claudication 7/18/2019    Avascular necrosis of femoral head, left 07/11/2020    right hip after surgery    Carotid stenosis     Chronic mucoid otitis media     Chronic rhinitis     Coronary artery disease     HEART BYPASS 2004    Crohn's disease of large intestine with other complication 7/30/2020    Chronic diarrhea Colonoscopy July 2020 revealed mild patchy scattered hemosiderin staining with inflammation more so in rectosigmoid area.  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    Iron deficiency anemia 7/14/2020    Mixed hearing loss of left ear     PAD (peripheral artery disease) 8/11/2020    Perianal abscess     Pernicious anemia 08/17/2020    took shots but never diagnosed with b12 deficiency    Personal history of alcoholism 08/11/2020    quit drinking in 2013    Prostatic hypertrophy 8/11/2020    Sensorineural hearing loss     Sepsis with acute renal failure 9/15/2020    Shortness of breath 5/27/2021    Tinnitus     Ventricular tachycardia, nonsustained 7/14/2020    Weight loss 7/11/2020     Past Surgical History:   Procedure Laterality Date    ARTERY SURGERY  2021    right side on neck    CAROTID ENDARTERECTOMY Right 5/10/2021    Procedure: RIGHT CAROTID ENDARTERECTOMY WITH EEG;  Surgeon: Gil Pineda DO;  Location: [facility] OR ;  Service: Vascular;  Laterality: Right;    COLONOSCOPY N/A 7/2/2020    Procedure: COLONOSCOPY WITH ANESTHESIA;  Surgeon: Adrien Brewster MD;  Location: [facility] ENDOSCOPY;  Service: Gastroenterology;  Laterality: N/A;  pre op: diarrhea  post op: polyps  PCP: Joe Velasco MD     COLONOSCOPY N/A 10/13/2020    Procedure: COLONOSCOPY WITH ANESTHESIA;  Surgeon: Adiren Brewster MD;  Location: [facility] ENDOSCOPY;  Service: Gastroenterology;  Laterality: N/A;  Pre: Chronic Diarrhea, Crohn's  Post: AVM  Dr. Neftali Velasco  CO2 Inflation Used    COLONOSCOPY N/A 12/8/2023    Procedure: COLONOSCOPY WITH ANESTHESIA;  Surgeon: Adrien Brewster MD;  Location: [facility] ENDOSCOPY;  Service: Gastroenterology;  Laterality: N/A;  pre chrone's disease  post sub optimal prep, polyp, chrone's      CORONARY ARTERY BYPASS GRAFT  2003    x3    ENDOSCOPY N/A 11/2/2021    Procedure: ESOPHAGOGASTRODUODENOSCOPY WITH ANESTHESIA;  Surgeon: Bridger Bell MD;  Location: [facility] ENDOSCOPY;  Service: Gastroenterology;  Laterality: N/A;  pre anemia;gi bleed  post  gi bleed;schatski ring  Dr. ERIC Velasco    ENDOSCOPY N/A 10/10/2023    Procedure: ESOPHAGOGASTRODUODENOSCOPY WITH ANESTHESIA;  Surgeon: Adrien Brewster MD;  Location: [facility] ENDOSCOPY;  Service: Gastroenterology;  Laterality: N/A;  preop; anemia  postop esophagitis ; R/O barretts   PCP Randall Beata    EYE SURGERY Bilateral     catorac    INCISION AND DRAINAGE PERIRECTAL ABSCESS N/A 3/3/2017    Procedure: INCISION AND DRAINAGE OF JEET ANAL ABSCESS;  Surgeon: Lynette Smith MD;  Location: [facility] OR;  Service:     INGUINAL HERNIA REPAIR Bilateral 6/27/2023    Procedure: INGUINAL HERNIA BILATERAL REPAIR LAPAROSCOPIC WITH DAVINCI ROBOT WITH MESH;  Surgeon: Tahira Rivera MD;  Location: [facility] OR;  Service: Robotics - DaVinci;  Laterality: Bilateral;    MYRINGOTOMY W/ TUBES Left 04/17/2017    06/10/2016    TONSILLECTOMY      TOTAL HIP ARTHROPLASTY Right 2006      General Information       Row Name 01/11/25 0750          OT Time and Intention    Subjective Information complains of;fatigue  -CH     Document Type evaluation  -CH     Mode of Treatment occupational therapy  -CH     Patient Effort adequate  -CH     Symptoms Noted During/After Treatment none   -       Row Name 01/11/25 0750          General Information    Patient Profile Reviewed yes  -     Prior Level of Function independent:;all household mobility;community mobility;ADL's  -     Existing Precautions/Restrictions fall;oxygen therapy device and L/min  -     Barriers to Rehab medically complex  -       Row Name 01/11/25 0750          Occupational Profile    Environmental Supports and Barriers (Occupational Profile) pt also has ramp access  -       Row Name 01/11/25 0750          Living Environment    People in Home alone  -       Row Name 01/11/25 0750          Home Main Entrance    Number of Stairs, Main Entrance two  -       Row Name 01/11/25 0750          Stairs Within Home, Primary    Number of Stairs, Within Home, Primary none  -     Stair Railings, Within Home, Primary none  -       Row Name 01/11/25 0750          Cognition    Orientation Status (Cognition) oriented x 4  -       Row Name 01/11/25 0750          Safety Issues/Impairments Affecting Functional Mobility    Impairments Affecting Function (Mobility) endurance/activity tolerance;shortness of breath  -               User Key  (r) = Recorded By, (t) = Taken By, (c) = Cosigned By      Initials Name Provider Type     Tianna Hernandez, OTR/L Occupational Therapist                   Mobility/ADL's       Row Name 01/11/25 0750          Bed Mobility    Bed Mobility supine-sit;sit-supine  -     Supine-Sit [place] (Bed Mobility) modified independence  -     Sit-Supine [place] (Bed Mobility) modified independence  -     Assistive Device (Bed Mobility) head of bed elevated  -       Row Name 01/11/25 0750          Transfers    Transfers sit-stand transfer;stand-sit transfer  -       Row Name 01/11/25 0750          Sit-Stand Transfer    Sit-Stand [place] (Transfers) modified independence  -       Row Name 01/11/25 0750          Stand-Sit Transfer    Stand-Sit [place] (Transfers) modified  independence  -       Row Name 01/11/25 [facility]          Functional Mobility    Functional Mobility- Ind. Level conditional independence  -     Functional Mobility- Safety Issues supplemental O2  -       Row Name 01/11/25 0750          Activities of Daily Living    BADL Assessment/Intervention lower body dressing;toileting  -       Row Name 01/11/25 [facility]0          Lower Body Dressing Assessment/Training    [place] Level (Lower Body Dressing) don;doff;socks  -     Position (Lower Body Dressing) edge of bed sitting  -       Row Name 01/11/25 [facility]0          Toileting Assessment/Training    Comment, (Toileting) simulated toileting - S  -               User Key  (r) = Recorded By, (t) = Taken By, (c) = Cosigned By      Initials Name Provider Type     Tianna Hernandez, OTR/L Occupational Therapist                   Obj/Interventions       Row Name 01/11/25 [facility]          Sensory Assessment (Somatosensory)    Sensory Assessment (Somatosensory) UE sensation intact  -[facility] Name 01/11/25 [facility]          Vision Assessment/Intervention    Visual Impairment/Limitations WFL  -       Row Name 01/11/25 [facility]          Range of Motion Comprehensive    General Range of Motion no range of motion deficits identified  -[facility] Name 01/11/25 [facility]          Strength Comprehensive (MMT)    General Manual Muscle Testing (MMT) Assessment no strength deficits identified  -       Row Name 01/11/25 [facility]          Balance    Balance Assessment sitting static balance;sitting dynamic balance;sit to stand dynamic balance;standing static balance;standing dynamic balance  -     Static Sitting Balance modified independence  -     Dynamic Sitting Balance modified independence  -     Position, Sitting Balance unsupported  -     Sit to Stand Dynamic Balance modified independence  -     Static Standing Balance modified independence  -     Dynamic Standing Balance modified independence  -     Position/Device Used,  Standing Balance unsupported  -               User Key  (r) = Recorded By, (t) = Taken By, (c) = Cosigned By      Initials Name Provider Type    Tianna Cisneros, OTR/L Occupational Therapist                   Goals/Plan    No documentation.                  Clinical Impression       Row Name 01/11/25 [facility]          Pain Assessment    Pretreatment Pain Rating 0/10 - no pain  -     Posttreatment Pain Rating 0/10 - no pain  -       Row Name 01/11/25 [facility]          Plan of Care Review    Plan of Care Reviewed With patient  -     Progress no change  -     Outcome Evaluation OT eval complete.  Pt. is AxO x 4 & appears irritated.  Mr. Hugo is [place] but provides adequate report of PLOF and Hx.  He does not feel skilled therapy is needed at completes ADLs/fxl mob at S or better.  Edu'd pt in chance of supplemental O2 upon DC home may increase fall risk.  Pt verbalizes understanding.  No further OT tx  -       Row Name 01/11/25 [facility]          Therapy Assessment/Plan (OT)    Criteria for Skilled Therapeutic Interventions Met (OT) no;skilled treatment is necessary  -     Therapy Frequency (OT) evaluation only  -       Row Name 01/11/25 0750          Therapy Plan Review/Discharge Plan (OT)    Anticipated Discharge Disposition (OT) home with home health  -       Row Name 01/11/25 0750          Positioning and Restraints    Pre-Treatment Position in bed  -     Post Treatment Position bed  -     In Bed fowlers;call light within reach;encouraged to call for assist;side rails up x2;with nsg  -               User Key  (r) = Recorded By, (t) = Taken By, (c) = Cosigned By      Initials Name Provider Type    Tianna Cisneros, OTR/L Occupational Therapist                   Outcome Measures       Row Name 01/11/25 0750          How much help from another is currently needed...    Putting on and taking off regular lower body clothing? 4  -CH     Bathing (including washing, rinsing, and drying) 4  -CH     Toileting  (which includes using toilet bed pan or urinal) 4  -CH     Putting on and taking off regular upper body clothing 4  -CH     Taking care of personal grooming (such as brushing teeth) 4  -CH     Eating meals 4  -CH     AM-PAC 6 Clicks Score (OT) 24  -CH       Row Name 01/11/25 0750          Functional Assessment    Outcome Measure Options AM-PAC 6 Clicks Daily Activity (OT)  -CH               User Key  (r) = Recorded By, (t) = Taken By, (c) = Cosigned By      Initials Name Provider Type    Tianna Cisneros, OTR/L Occupational Therapist                  Occupational Therapy Education        No education to display                  OT Recommendation and Plan  Therapy Frequency (OT): evaluation only  Plan of Care Review  Plan of Care Reviewed With: patient  Progress: no change  Outcome Evaluation: OT eval complete.  Pt. is AxO x 4 & appears irritated.  Mr. Hugo is [place] but provides adequate report of PLOF and Hx.  He does not feel skilled therapy is needed at completes ADLs/fxl mob at S or better.  Edu'd pt in chance of supplemental O2 upon DC home may increase fall risk.  Pt verbalizes understanding.  No further OT tx  Plan of Care Reviewed With: patient  Outcome Evaluation: OT eval complete.  Pt. is AxO x 4 & appears irritated.  Mr. Hugo is [place] but provides adequate report of PLOF and Hx.  He does not feel skilled therapy is needed at completes ADLs/fxl mob at S or better.  Edu'd pt in chance of supplemental O2 upon DC home may increase fall risk.  Pt verbalizes understanding.  No further OT tx     Time Calculation:         Time Calculation- OT       Row Name 01/11/25 0750             Time Calculation- OT    OT Start Time 0750  -CH      OT Stop Time 0830  -CH      OT Time Calculation (min) 40 min  -CH      OT Received On 01/11/25  -CH         Untimed Charges    OT Eval/Re-eval Minutes 40  -CH         Total Minutes    Untimed Charges Total Minutes 40  -CH       Total Minutes 40  -CH                User Key  (r) =  Recorded By, (t) = Taken By, (c) = Cosigned By      Initials Name Provider Type     Tianna Hernandez, OTR/L Occupational Therapist                  Therapy Charges for Today       Code Description Service Date Service Provider Modifiers Qty    [id number] HC OT EVAL LOW COMPLEXITY 3 1/11/2025 Tianna Hernandez OTR/L GO 1               OT Discharge Summary  Anticipated Discharge Disposition (OT): home with home health    BEATRIS Red/NIK  1/11/2025

## 2025-01-11 NOTE — PLAN OF CARE
Goal Outcome Evaluation:  Plan of Care Reviewed With: patient        Progress: no change  Outcome Evaluation: OT eval complete.  Pt. is AxO x 4 & appears irritated.  Mr. Hugo is [place] but provides adequate report of PLOF and Hx.  He does not feel skilled therapy is needed at completes ADLs/fxl mob at S or better.  Edu'd pt in chance of supplemental O2 upon DC home may increase fall risk.  Pt verbalizes understanding.  No further OT tx    Anticipated Discharge Disposition (OT): home with home health

## 2025-01-11 NOTE — CASE MANAGEMENT/SOCIAL WORK
Discharge Planning Assessment  [facility]     Patient Name: Ricardo Hugo  MRN: [id number]  Today's Date: 1/11/2025    Admit Date: 1/10/2025        Discharge Needs Assessment       Row Name 01/11/25 0820       Living Environment    People in Home alone    Current Living Arrangements home    Potentially Unsafe Housing Conditions none    Primary Care Provided by self    Provides Primary Care For no one    Family Caregiver if Needed child(jamshid), adult    Quality of Family Relationships helpful;involved    Able to Return to Prior Arrangements yes       Resource/Environmental Concerns    Resource/Environmental Concerns none    Transportation Concerns none       Transition Planning    Patient/Family Anticipates Transition to home    Patient/Family Anticipated Services at Transition none    Transportation Anticipated family or friend will provide       Discharge Needs Assessment    Readmission Within the Last 30 Days no previous admission in last 30 days    Equipment Currently Used at Home none    Concerns to be Addressed denies needs/concerns at this time    Anticipated Changes Related to Illness none    Equipment Needed After Discharge none    Discharge Coordination/Progress PT resides at home alone and has family who assist as needed. PT plans to return home upon dc and is unaware of any needs at this time. PT has PCP and Rx coverage. Therapy evals pending. SW will follow and assist as needed.                   Discharge Plan    No documentation.                 Continued Care and Services - Admitted Since 1/10/2025    No active coordination exists for this encounter.          Demographic Summary    No documentation.                  Functional Status    No documentation.                  Psychosocial    No documentation.                  Abuse/Neglect    No documentation.                  Legal    No documentation.                  Substance Abuse    No documentation.                  Patient Forms    No  documentation.                     Lyssa Tadeo, CSW

## 2025-01-11 NOTE — SIGNIFICANT NOTE
I was called with this patient for having high blood pressure with systolic in the 190s.  I placed an order of hydralazine IV once.  Day team to follow.

## 2025-01-11 NOTE — PROGRESS NOTES
[facility] Medicine Services  INPATIENT PROGRESS NOTE    Patient Name: Ricardo Hugo  Date of Admission: 1/10/2025  Today's Date: 01/11/25  Length of Stay: 1  Primary Care Physician: Nathan Zimmer MD    Subjective   Chief Complaint: Progressive shortness of breath  HPI     sandie Hugo is a 74-year-old male with a past medical history of diastolic heart failure, coronary artery disease with history of bypass, Crohn's disease, chronic kidney disease stage IV, hypertension, anemia of chronic disease, please see below for complete list.  Patient presents to [facility] ED with complaints of shortness of breathing.  He reported home oxygen saturation in the mid 80s with ambulation.  At rest in the emergency department he was 88/89% on room air.  Patient does not normally wear oxygen.  Currently he is on 2 L.  Patient treated for pneumonia per PCP with Augmentin and azithromycin 12/24/2024 based on x-ray, right lower lobe.  Due to ongoing symptoms he was started on cefdinir 300 mg daily 1/8/2025 x 5 doses.  ER workup reveals acute heart failure.  pCO2 64.5, pH 7.35, pCO2 32.3, saturation 92.5%, troponins 65 followed with 62 with a -5 delta, proBNP 32,562, creatinine 3.4 at baseline, hemoglobin 9 at baseline and platelets 80,000.  Blood cultures obtained. Respiratory panel negative.  Chest x-ray revealed Small to moderate right and trace left pleural effusions with mild interstitial opacities, suggestive of pulmonary edema.  EKG and monitor revealed bradycardia low 40s at time of my assessment 41.  Patient states he is chronically low however I will hold beta-blocker for now.  Condition is stable.  Patient is extremely weak.  He denies chest pain.  He states shortness of breathing has improved with oxygen.  He is admitted for further evaluation treatment.   1/11  Before history of hypertension diastolic CHF chronic renal failure presented with shortness of breath pulmonary edema,  "is on Lasix echo of the heart is pending, beta  blocker was put on hold secondary to bradycardia we will DC Diovan with creatinine 3.4, blood pressure remains a challenge    Review of Systems   All pertinent negatives and positives are as above. All other systems have been reviewed and are negative unless otherwise stated.     Objective    Temp:  [98 °F (36.7 °C)-98.6 °F (37 °C)] 98.1 °F (36.7 °C)  Heart Rate:  [42-48] 47  Resp:  [14-17] 16  BP: (162-201)/(43-58) 171/46  Physical Exam  Constitutional:       Appearance: Normal appearance.   HENT:      Head: Normocephalic.      Nose: Nose normal.   Eyes:      Pupils: Pupils are equal, round, and reactive to light.   Cardiovascular:      Rate and Rhythm: Normal rate.   Pulmonary:      Effort: Respiratory distress present.      Breath sounds: Wheezing present.   Abdominal:      General: Abdomen is flat.   Musculoskeletal:      Cervical back: Normal range of motion.   Skin:     Capillary Refill: Capillary refill takes less than 2 seconds.   Neurological:      Mental Status: He is alert.             Results Review:  I have reviewed the labs, radiology results, and diagnostic studies.    Laboratory Data:   Results from last 7 days   Lab Units 01/11/25  0522 01/10/25  1508   WBC 10*3/mm3 4.38 5.17   HEMOGLOBIN g/dL 9.7* 9.0*   HEMATOCRIT % 30.8* 28.6*   PLATELETS 10*3/mm3 102* 80*        Results from last 7 days   Lab Units 01/11/25  0522 01/10/25  1508 01/10/25  1347   SODIUM mmol/L 139 139  --    SODIUM, ARTERIAL mmol/L  --   --  139   POTASSIUM mmol/L 4.3 4.4  --    CHLORIDE mmol/L 107 104  --    CO2 mmol/L 17.0* 17.0*  --    BUN mg/dL 63* 58*  --    CREATININE mg/dL 3.52* 3.41*  --    CALCIUM mg/dL 8.5* 8.6  --    BILIRUBIN mg/dL  --  0.4  --    ALK PHOS U/L  --  142*  --    ALT (SGPT) U/L  --  12  --    AST (SGOT) U/L  --  26  --    GLUCOSE mg/dL 115* 85  --        Culture Data:   No results found for: \"BLOODCX\", \"URINECX\", \"WOUNDCX\", \"MRSACX\", \"RESPCX\", " "\"STOOLCX\"    Radiology Data:   Imaging Results (Last 24 Hours)       Procedure Component Value Units Date/Time    CT Chest Without Contrast Diagnostic [[id number]] Collected: 01/11/25 0737     Updated: 01/11/25 0749    Narrative:      EXAMINATION:  CT CHEST WO CONTRAST DIAGNOSTIC-  1/11/2025 4:09 AM     HISTORY: Evaluate pneumonia.     COMPARISON : Chest x-ray 1/10/2025.     DLP: 169.96 mGy.cm Automated dosage reduction technique was utilized to  reduce patient dosage.     TECHNIQUE: Spiral CT was performed of the chest without contrast.  Sagittal and coronal images were reconstructed.     MEDIASTINUM, HEART AND VASCULAR STRUCTURES: There is carotid artery  calcification in the neck. There is atheromatous disease of the thoracic  aorta and coronary arteries. There is cardiomegaly. The ascending aorta  is dilated measuring 4 cm. Mid aortic arch measures 3.2 cm. Descending  aorta measures 2.9 cm. Main pulmonary artery segment dilated measuring  3.7 cm. There are enlarged mediastinal lymph nodes. There is a 1.3 cm  mediastinal lymph node to the left of the distal ascending aorta. There  is a 2.3 cm short axis diameter lymph node anterior to the distal  trachea. There is a 2.1 cm short axis diameter left paratracheal lymph  node. The hilar areas are not well evaluated due to the lack of contrast  administration. There are no enlarged axillary lymph nodes.     LUNGS: There is a large right pleural effusion. There is a small left  pleural effusion. There is near complete atelectasis of the right lower  lobe and partial collapse of the right middle lobe. There is patchy  consolidation in the left lower lobe. There is some degree of paraseptal  and centrilobular emphysema.     UPPER ABDOMEN: There is a small amount of ascites around the liver and  spleen. There is thickening of the gastric wall.     BONES: There is body wall edema. There are degenerative changes of the  spine and shoulders. There is avascular necrosis of the " right humeral  head.          Impression:      1. Large right and small left pleural effusions.  2. Near complete collapse/atelectasis of the right lower lobe and  partial collapse/atelectasis of the right middle lobe. Patchy  consolidation in the left lower lobe is likely due to atelectasis.  Pneumonia cannot be ruled out.  3. Mediastinal lymphadenopathy. The lymph nodes could be reactive or  neoplastic. The largest lymph node is anterior to the distal trachea  with a short axis diameter of 2.3 cm.  4. Cardiomegaly. Atheromatous disease of the thoracic aorta and coronary  arteries. Prior heart surgery. Dilated ascending thoracic aorta  measuring 4 cm. The remainder of the thoracic aorta is normal caliber.  Dilated main pulmonary artery segment measuring 3.7 cm suggesting  pulmonary hypertension.  5. Small amount of ascites around the liver and spleen. Body wall edema.  6. Thickening of the gastric wall may be due to underdistention.  Gastritis and other etiologies considered.  7. Avascular necrosis right humeral head. Degenerative changes of the  spine and shoulders.           This report was signed and finalized on 1/11/2025 7:46 AM by Dr. Mikael Gallegos MD.       XR Chest 1 View [[id number]] Collected: 01/10/25 1356     Updated: 01/10/25 1400    Narrative:      EXAM: XR CHEST 1 VW- 1/10/2025 12:46 PM     HISTORY: Short of breath and wheezing cough       COMPARISON: 12/26/2021.     TECHNIQUE: Single frontal radiograph of the chest was obtained.     FINDINGS:     Support Devices: Prior CABG.     Cardiac and Mediastinal Silhouettes: Cardiomegaly.     Lungs/Pleura: Small to moderate right and trace left pleural effusions  with mild interstitial opacities. No visible pneumothorax.     Osseous structures: No acute osseous finding.     Other: None.       Impression:         Small to moderate right and trace left pleural effusions with mild  interstitial opacities, suggestive of pulmonary edema.           This report was  signed and finalized on 1/10/2025 1:57 PM by Jake Briseno.               I have reviewed the patient's current medications.     Assessment/Plan   Assessment  Active Hospital Problems    Diagnosis     **Acute on chronic diastolic congestive heart failure     Acute respiratory failure with hypoxia     Bradycardia     History of pneumonia, current treatment with cefdinir     CKD (chronic kidney disease) stage 4, GFR 15-29 ml/min     Essential hypertension        Treatment Plan   1.  Acute on chronic diastolic heart failure, initiate heart failure pathway, Lasix 40 mg twice daily for now, echocardiogram in a.m.     2.  Acute respiratory failure with hypoxia, supplemental oxygen, ABGs as needed, incentive spirometry, CT of the chest without contrast in a.m.     3.  Bradycardia, hold home beta-blocker, routine telemetry orders, continuous cardiac monitoring     4.  Recent diagnosis pneumonia currently being treated with cefdinir previously 12/24/2024 treated with Augmentin and azithromycin, CT of the chest in a.m. without contrast, hold cefdinir for now     5.  CKD stage IV, BMP in a.m. monitor closely with need for diuresis, consider nephrology consult if kidney function worsens     6.  Essential hypertension, continue multiple home antihypertensive agents, monitor vital signs per protocol     7.  DVT prophylaxis with SCDs, home medications reviewed and restarted as appropriate, labs in a.m., consult PT and OT for strengthening        Medical Decision Making  Number and Complexity of problems: 6  3 problems, acute, high complexity, unchanged  2 problems, chronic, high complexity, unchanged  Differential Diagnosis: None     Conditions and Status        Condition is unchanged.     [facility] Data  External documents reviewed: Epic records  Cardiac tracing (EKG, telemetry) interpretation: Reviewed  Radiology interpretation: Reviewed  Labs reviewed: Reviewed  Any tests that were considered but not ordered: No     Decision  rules/scores evaluated (example [id number], Wells, etc): No     Discussed with: Patient and Dr. Miramontes     Care Planning  Shared decision making: Patient and Dr. Miramontes  Code status and discussions: Full     Disposition  Social Determinants of Health that impact treatment or disposition: None  Estimated length of stay is 2+ days.      I confirmed that the patient's advanced care plan is present, code status is documented, and a surrogate decision maker is listed in the patient's medical record.      The patient's surrogate decision maker is children        Electronically signed by Vidal Miramontes MD, 01/11/25, 10:29 CST.

## 2025-01-11 NOTE — PLAN OF CARE
Goal Outcome Evaluation:  Plan of Care Reviewed With: other (see comments)        Progress: no change (Initial assessment)  Outcome Evaluation: Initial nutrition assessment. Pt reports weight loss and poor appetite per nurse admission screening. He presented to the ED with worsening SOB. He has been undergoing OP treatment for PNA. He is admitted with acute respiratory failure and acute on chronic diastolic CHF. He has PMH of CAD (hx of CABG), Crohn's disease, CKD IV, HTN, & anemia of chronic disease. He is ordered a heart healthy diet. He did not have dentures with him upon admission, family to bring in today. He requested soft foods while he does not have dentures for ease of chewing. Modified diet consistency to soft to chew, whole foods. He consumed 100% of breakfast this morning. Per review of weights, weight fluctuations over the last year, but no sig weight changes. BMI = 20.61. He may have lost ~10# over the last year, but has weight fluctuations likely secondary to CHF.  Pt is off the floor in cardiology at this time for cardiac testing. FNS dining ambassador will see daily for meal choices while in the hospital. Will follow to ensure appetite/intake remains adequate and alter diet consistency as needed for ease of chewing.

## 2025-01-12 ENCOUNTER — APPOINTMENT (OUTPATIENT)
Dept: ULTRASOUND IMAGING | Facility: HOSPITAL | Age: 75
End: 2025-01-12
Payer: MEDICARE

## 2025-01-12 LAB
ALBUMIN SERPL-MCNC: 3.4 G/DL (ref 3.5–5.2)
ALBUMIN/GLOB SERPL: 1.5 G/DL
ALP SERPL-CCNC: 133 U/L (ref 39–117)
ALT SERPL W P-5'-P-CCNC: 8 U/L (ref 1–41)
ANION GAP SERPL CALCULATED.3IONS-SCNC: 12 MMOL/L (ref 5–15)
AST SERPL-CCNC: 17 U/L (ref 1–40)
BACTERIA UR QL AUTO: ABNORMAL /HPF
BASOPHILS # BLD AUTO: 0.04 10*3/MM3 (ref 0–0.2)
BASOPHILS NFR BLD AUTO: 0.7 % (ref 0–1.5)
BILIRUB SERPL-MCNC: 0.3 MG/DL (ref 0–1.2)
BILIRUB UR QL STRIP: NEGATIVE
BUN SERPL-MCNC: 76 MG/DL (ref 8–23)
BUN/CREAT SERPL: 20.4 (ref 7–25)
CALCIUM SPEC-SCNC: 8.2 MG/DL (ref 8.6–10.5)
CHLORIDE SERPL-SCNC: 108 MMOL/L (ref 98–107)
CHOLEST SERPL-MCNC: 104 MG/DL (ref 0–200)
CLARITY UR: CLEAR
CO2 SERPL-SCNC: 19 MMOL/L (ref 22–29)
COLOR UR: YELLOW
CREAT SERPL-MCNC: 3.72 MG/DL (ref 0.76–1.27)
CREAT UR-MCNC: 26.1 MG/DL
DEPRECATED RDW RBC AUTO: 62.5 FL (ref 37–54)
EGFRCR SERPLBLD CKD-EPI 2021: 16.1 ML/MIN/1.73
EOSINOPHIL # BLD AUTO: 0.16 10*3/MM3 (ref 0–0.4)
EOSINOPHIL NFR BLD AUTO: 2.9 % (ref 0.3–6.2)
EOSINOPHIL SPEC QL MICRO: 0 % EOS/100 CELLS (ref 0–0)
ERYTHROCYTE [DISTWIDTH] IN BLOOD BY AUTOMATED COUNT: 16.3 % (ref 12.3–15.4)
GLOBULIN UR ELPH-MCNC: 2.3 GM/DL
GLUCOSE SERPL-MCNC: 104 MG/DL (ref 65–99)
GLUCOSE UR STRIP-MCNC: NEGATIVE MG/DL
HCT VFR BLD AUTO: 28.6 % (ref 37.5–51)
HDLC SERPL-MCNC: 31 MG/DL (ref 40–60)
HGB BLD-MCNC: 8.8 G/DL (ref 13–17.7)
HGB UR QL STRIP.AUTO: NEGATIVE
HYALINE CASTS UR QL AUTO: ABNORMAL /LPF
IMM GRANULOCYTES # BLD AUTO: 0.05 10*3/MM3 (ref 0–0.05)
IMM GRANULOCYTES NFR BLD AUTO: 0.9 % (ref 0–0.5)
KETONES UR QL STRIP: NEGATIVE
LDLC SERPL CALC-MCNC: 60 MG/DL (ref 0–100)
LDLC/HDLC SERPL: 1.97 {RATIO}
LEUKOCYTE ESTERASE UR QL STRIP.AUTO: NEGATIVE
LYMPHOCYTES # BLD AUTO: 0.81 10*3/MM3 (ref 0.7–3.1)
LYMPHOCYTES NFR BLD AUTO: 14.5 % (ref 19.6–45.3)
MCH RBC QN AUTO: 32.2 PG (ref 26.6–33)
MCHC RBC AUTO-ENTMCNC: 30.8 G/DL (ref 31.5–35.7)
MCV RBC AUTO: 104.8 FL (ref 79–97)
MONOCYTES # BLD AUTO: 0.62 10*3/MM3 (ref 0.1–0.9)
MONOCYTES NFR BLD AUTO: 11.1 % (ref 5–12)
NEUTROPHILS NFR BLD AUTO: 3.9 10*3/MM3 (ref 1.7–7)
NEUTROPHILS NFR BLD AUTO: 69.9 % (ref 42.7–76)
NITRITE UR QL STRIP: NEGATIVE
NRBC BLD AUTO-RTO: 0 /100 WBC (ref 0–0.2)
OSMOLALITY UR: 333 MOSM/KG (ref 50–1400)
PH UR STRIP.AUTO: 5.5 [PH] (ref 5–8)
PLATELET # BLD AUTO: 94 10*3/MM3 (ref 140–450)
PMV BLD AUTO: 10.2 FL (ref 6–12)
POTASSIUM SERPL-SCNC: 4.1 MMOL/L (ref 3.5–5.2)
PROT ?TM UR-MCNC: 50.3 MG/DL
PROT SERPL-MCNC: 5.7 G/DL (ref 6–8.5)
PROT UR QL STRIP: ABNORMAL
PTH-INTACT SERPL-MCNC: 236 PG/ML (ref 15–65)
RBC # BLD AUTO: 2.73 10*6/MM3 (ref 4.14–5.8)
RBC # UR STRIP: ABNORMAL /HPF
REF LAB TEST METHOD: ABNORMAL
SODIUM SERPL-SCNC: 139 MMOL/L (ref 136–145)
SODIUM UR-SCNC: 111 MMOL/L
SODIUM UR-SCNC: 113 MMOL/L
SP GR UR STRIP: 1.01 (ref 1–1.03)
SQUAMOUS #/AREA URNS HPF: ABNORMAL /HPF
TRIGL SERPL-MCNC: 59 MG/DL (ref 0–150)
UROBILINOGEN UR QL STRIP: ABNORMAL
VLDLC SERPL-MCNC: 13 MG/DL (ref 5–40)
WBC # UR STRIP: ABNORMAL /HPF
WBC NRBC COR # BLD AUTO: 5.58 10*3/MM3 (ref 3.4–10.8)

## 2025-01-12 PROCEDURE — 25010000002 FUROSEMIDE PER 20 MG: Performed by: NURSE PRACTITIONER

## 2025-01-12 PROCEDURE — 80061 LIPID PANEL: CPT | Performed by: INTERNAL MEDICINE

## 2025-01-12 PROCEDURE — 84156 ASSAY OF PROTEIN URINE: CPT | Performed by: CLINICAL NURSE SPECIALIST

## 2025-01-12 PROCEDURE — 99222 1ST HOSP IP/OBS MODERATE 55: CPT | Performed by: INTERNAL MEDICINE

## 2025-01-12 PROCEDURE — 80053 COMPREHEN METABOLIC PANEL: CPT | Performed by: HOSPITALIST

## 2025-01-12 PROCEDURE — 87205 SMEAR GRAM STAIN: CPT | Performed by: CLINICAL NURSE SPECIALIST

## 2025-01-12 PROCEDURE — 83935 ASSAY OF URINE OSMOLALITY: CPT | Performed by: CLINICAL NURSE SPECIALIST

## 2025-01-12 PROCEDURE — 84300 ASSAY OF URINE SODIUM: CPT | Performed by: CLINICAL NURSE SPECIALIST

## 2025-01-12 PROCEDURE — 82570 ASSAY OF URINE CREATININE: CPT | Performed by: CLINICAL NURSE SPECIALIST

## 2025-01-12 PROCEDURE — 85025 COMPLETE CBC W/AUTO DIFF WBC: CPT | Performed by: HOSPITALIST

## 2025-01-12 PROCEDURE — 81001 URINALYSIS AUTO W/SCOPE: CPT | Performed by: INTERNAL MEDICINE

## 2025-01-12 PROCEDURE — 76775 US EXAM ABDO BACK WALL LIM: CPT

## 2025-01-12 PROCEDURE — 83970 ASSAY OF PARATHORMONE: CPT | Performed by: INTERNAL MEDICINE

## 2025-01-12 PROCEDURE — 84300 ASSAY OF URINE SODIUM: CPT | Performed by: INTERNAL MEDICINE

## 2025-01-12 RX ORDER — CHOLESTYRAMINE LIGHT 4 G/5.7G
4 POWDER, FOR SUSPENSION ORAL DAILY
COMMUNITY

## 2025-01-12 RX ORDER — NIFEDIPINE 90 MG/1
90 TABLET, EXTENDED RELEASE ORAL DAILY
COMMUNITY
End: 2025-01-16 | Stop reason: HOSPADM

## 2025-01-12 RX ORDER — MESALAMINE 0.38 G/1
4 CAPSULE, EXTENDED RELEASE ORAL DAILY
COMMUNITY

## 2025-01-12 RX ORDER — MONTELUKAST SODIUM 10 MG/1
10 TABLET ORAL NIGHTLY
COMMUNITY

## 2025-01-12 RX ORDER — ERGOCALCIFEROL 1.25 MG/1
50000 CAPSULE, LIQUID FILLED ORAL WEEKLY
COMMUNITY

## 2025-01-12 RX ORDER — NIFEDIPINE 60 MG/1
120 TABLET, EXTENDED RELEASE ORAL DAILY
Status: DISCONTINUED | OUTPATIENT
Start: 2025-01-13 | End: 2025-01-15

## 2025-01-12 RX ORDER — ALBUTEROL SULFATE 90 UG/1
2 INHALANT RESPIRATORY (INHALATION) 4 TIMES DAILY PRN
COMMUNITY

## 2025-01-12 RX ORDER — CLOPIDOGREL BISULFATE 75 MG/1
75 TABLET ORAL DAILY
COMMUNITY

## 2025-01-12 RX ORDER — SODIUM BICARBONATE 650 MG/1
3250 TABLET ORAL DAILY
COMMUNITY
End: 2025-01-16

## 2025-01-12 RX ORDER — FLUTICASONE PROPIONATE 50 MCG
2 SPRAY, SUSPENSION (ML) NASAL DAILY
COMMUNITY

## 2025-01-12 RX ORDER — ALPRAZOLAM 0.25 MG/1
0.25 TABLET ORAL NIGHTLY
COMMUNITY

## 2025-01-12 RX ADMIN — DOCUSATE SODIUM 100 MG: 100 CAPSULE, LIQUID FILLED ORAL at 21:10

## 2025-01-12 RX ADMIN — CLONIDINE HYDROCHLORIDE 0.3 MG: 0.1 TABLET ORAL at 21:10

## 2025-01-12 RX ADMIN — SODIUM BICARBONATE 1300 MG: 650 TABLET ORAL at 08:17

## 2025-01-12 RX ADMIN — VALSARTAN 320 MG: 80 TABLET, FILM COATED ORAL at 08:17

## 2025-01-12 RX ADMIN — FUROSEMIDE 40 MG: 10 INJECTION, SOLUTION INTRAVENOUS at 08:16

## 2025-01-12 RX ADMIN — Medication 10 ML: at 08:17

## 2025-01-12 RX ADMIN — HYDRALAZINE HYDROCHLORIDE 100 MG: 50 TABLET ORAL at 08:17

## 2025-01-12 RX ADMIN — HYDRALAZINE HYDROCHLORIDE 100 MG: 50 TABLET ORAL at 21:10

## 2025-01-12 RX ADMIN — EMPAGLIFLOZIN 10 MG: 10 TABLET, FILM COATED ORAL at 17:30

## 2025-01-12 RX ADMIN — DOCUSATE SODIUM 100 MG: 100 CAPSULE, LIQUID FILLED ORAL at 08:17

## 2025-01-12 RX ADMIN — NIFEDIPINE 90 MG: 60 TABLET, FILM COATED, EXTENDED RELEASE ORAL at 08:16

## 2025-01-12 RX ADMIN — LEVOTHYROXINE SODIUM 75 MCG: 75 TABLET ORAL at 05:52

## 2025-01-12 RX ADMIN — CETIRIZINE HYDROCHLORIDE TABLETS 10 MG: 10 TABLET, FILM COATED ORAL at 08:16

## 2025-01-12 RX ADMIN — TAMSULOSIN HYDROCHLORIDE 0.4 MG: 0.4 CAPSULE ORAL at 21:10

## 2025-01-12 RX ADMIN — CHOLESTYRAMINE 1 PACKET: 4 POWDER, FOR SUSPENSION ORAL at 08:16

## 2025-01-12 RX ADMIN — ASPIRIN 81 MG: 81 TABLET, COATED ORAL at 08:16

## 2025-01-12 RX ADMIN — CLOPIDOGREL BISULFATE 75 MG: 75 TABLET ORAL at 08:17

## 2025-01-12 RX ADMIN — FLUTICASONE PROPIONATE 2 SPRAY: 50 SPRAY, METERED NASAL at 08:18

## 2025-01-12 RX ADMIN — PANTOPRAZOLE SODIUM 40 MG: 40 TABLET, DELAYED RELEASE ORAL at 05:52

## 2025-01-12 RX ADMIN — MONTELUKAST SODIUM 10 MG: 10 TABLET, COATED ORAL at 21:10

## 2025-01-12 RX ADMIN — FUROSEMIDE 40 MG: 10 INJECTION, SOLUTION INTRAVENOUS at 17:30

## 2025-01-12 RX ADMIN — Medication 10 MG: at 21:10

## 2025-01-12 RX ADMIN — DOCUSATE SODIUM 100 MG: 100 CAPSULE, LIQUID FILLED ORAL at 16:19

## 2025-01-12 RX ADMIN — ATORVASTATIN CALCIUM 10 MG: 10 TABLET, FILM COATED ORAL at 08:16

## 2025-01-12 RX ADMIN — SODIUM BICARBONATE 1300 MG: 650 TABLET ORAL at 21:10

## 2025-01-12 RX ADMIN — HYDRALAZINE HYDROCHLORIDE 100 MG: 50 TABLET ORAL at 16:19

## 2025-01-12 RX ADMIN — SODIUM BICARBONATE 650 MG: 650 TABLET ORAL at 12:26

## 2025-01-12 RX ADMIN — Medication 10 ML: at 21:10

## 2025-01-12 RX ADMIN — CLONIDINE HYDROCHLORIDE 0.3 MG: 0.1 TABLET ORAL at 16:19

## 2025-01-12 RX ADMIN — CLONIDINE HYDROCHLORIDE 0.3 MG: 0.1 TABLET ORAL at 08:17

## 2025-01-12 RX ADMIN — MESALAMINE 1.5 G: 375 CAPSULE, EXTENDED RELEASE ORAL at 08:16

## 2025-01-12 NOTE — CONSULTS
Subjective   Ricardo Hugo is a 74 y.o. male is being seen for consultation today at the request of Vidal Miramontes MD    Chief Complaint: congestion    HPI: Mr Hugo is a 74 year old male with significant past medical history coronary artery disease status post CABG, chronic diastolic CHF, CKD stage IV, hypertension, peripheral arterial disease, anemia of chronic disease, CLL, mixed hyperlipidemia, Crohn's disease, and anxiety.  He presented to the emergency room on 1/10/2025 with complaints of congestion and shortness of breath.  He reports being diagnosed with pneumonia last month, but has remained congested despite antibiotics.  He also reports feeling weak and dizzy.  He denies any chest pain or palpitations.  He has been receiving diuresis with IV Lasix and notes good urine output and some improvement in his breathing.  The oxygen also seems to have helped his breathing.      Patient Active Problem List   Diagnosis    Chronic rhinitis    Essential hypertension    Resistant hypertension    Chronic diarrhea    Symptomatic anemia    Wellness examination    PAD (peripheral artery disease)    IHD (ischemic heart disease)    Carotid stenosis    Stenosis of right carotid artery    Carotid stenosis, right    Diastolic dysfunction    CKD (chronic kidney disease) stage 4, GFR 15-29 ml/min    Anemia due to chronic kidney disease    Copper deficiency    Low iron     CLL (chronic lymphocytic leukemia)    Perforation of left tympanic membrane    Mixed hyperlipidemia    Systolic murmur    Eustachian tube dysfunction, bilateral    Sensorineural hearing loss (SNHL), bilateral    Anticoagulated    Nasal septal deviation    Hypertrophy of inferior nasal turbinate    Unilateral recurrent inguinal hernia without obstruction or gangrene    Bilateral inguinal hernia without obstruction or gangrene    Sleep difficulties    Dermatitis associated with moisture    Proteinuria    Acute on chronic diastolic congestive heart failure     Acute respiratory failure with hypoxia    Bradycardia    History of pneumonia, current treatment with cefdinir       Past Medical History:   Diagnosis Date    3-vessel CAD 8/11/2020    Allergic rhinitis     Anxiety disorder 4/27/2020    Arthritis     Asymmetrical sensorineural hearing loss 6/28/2017    Atherosclerosis of native artery of both lower extremities with intermittent claudication 7/18/2019    Avascular necrosis of femoral head, left 07/11/2020    right hip after surgery    Carotid stenosis     Chronic mucoid otitis media     Chronic rhinitis     Coronary artery disease     HEART BYPASS 2004    Crohn's disease of large intestine with other complication 7/30/2020    Chronic diarrhea Colonoscopy July 2020 revealed mild patchy scattered hemosiderin staining with inflammation more so in rectosigmoid area.  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    Iron deficiency anemia 7/14/2020    Mixed hearing loss of left ear     PAD (peripheral artery disease) 8/11/2020    Perianal abscess     Pernicious anemia 08/17/2020    took shots but never diagnosed with b12 deficiency    Personal history of alcoholism 08/11/2020    quit drinking in 2013    Prostatic hypertrophy 8/11/2020    Sensorineural hearing loss     Sepsis with acute renal failure 9/15/2020    Shortness of breath 5/27/2021    Tinnitus     Ventricular tachycardia, nonsustained 7/14/2020    Weight loss 7/11/2020     Past Surgical History:   Procedure Laterality Date    ARTERY SURGERY  2021    right side on neck    CAROTID ENDARTERECTOMY Right 5/10/2021    Procedure: RIGHT CAROTID ENDARTERECTOMY WITH EEG;  Surgeon: Miriam  Gil FUCHS DO;  Location: [facility] HYBRID OR 12;  Service: Vascular;  Laterality: Right;    COLONOSCOPY N/A 7/2/2020    Procedure: COLONOSCOPY WITH ANESTHESIA;  Surgeon: Adrien Brewster MD;  Location: [facility] ENDOSCOPY;  Service: Gastroenterology;  Laterality: N/A;  pre op: diarrhea  post op: polyps  PCP: Joe Velasco MD    COLONOSCOPY N/A 10/13/2020    Procedure: COLONOSCOPY WITH ANESTHESIA;  Surgeon: Adrien Brewster MD;  Location: [facility] ENDOSCOPY;  Service: Gastroenterology;  Laterality: N/A;  Pre: Chronic Diarrhea, Crohn's  Post: AVM  Dr. Neftali Velasco  CO2 Inflation Used    COLONOSCOPY N/A 12/8/2023    Procedure: COLONOSCOPY WITH ANESTHESIA;  Surgeon: Adrien Brewster MD;  Location: [facility] ENDOSCOPY;  Service: Gastroenterology;  Laterality: N/A;  pre chrone's disease  post sub optimal prep, polyp, chrone's      CORONARY ARTERY BYPASS GRAFT  2003    x3    ENDOSCOPY N/A 11/2/2021    Procedure: ESOPHAGOGASTRODUODENOSCOPY WITH ANESTHESIA;  Surgeon: Bridger Bell MD;  Location: [facility] ENDOSCOPY;  Service: Gastroenterology;  Laterality: N/A;  pre anemia;gi bleed  post  gi bleed;schatski ring  Dr. ERIC Velasco    ENDOSCOPY N/A 10/10/2023    Procedure: ESOPHAGOGASTRODUODENOSCOPY WITH ANESTHESIA;  Surgeon: Adrien Brewster MD;  Location: [facility] ENDOSCOPY;  Service: Gastroenterology;  Laterality: N/A;  preop; anemia  postop esophagitis ; R/O barretts   PCP Randall Beata    EYE SURGERY Bilateral     catorac    INCISION AND DRAINAGE PERIRECTAL ABSCESS N/A 3/3/2017    Procedure: INCISION AND DRAINAGE OF JEET ANAL ABSCESS;  Surgeon: Lynette Smith MD;  Location: [facility] OR;  Service:     INGUINAL HERNIA REPAIR Bilateral 6/27/2023    Procedure: INGUINAL HERNIA BILATERAL REPAIR LAPAROSCOPIC WITH DAVINCI ROBOT WITH MESH;  Surgeon: Tahira Rivera MD;  Location: [facility] OR;  Service: Robotics - DaVinci;  Laterality: Bilateral;    MYRINGOTOMY W/ TUBES Left 04/17/2017    06/10/2016    TONSILLECTOMY       "TOTAL HIP ARTHROPLASTY Right        The following portions of the patient's history were reviewed and updated as appropriate: allergies, current medications, past family history, past medical history, past social history, past surgical history, and problem list.    Social History     Socioeconomic History    Marital status:    Tobacco Use    Smoking status: Former     Current packs/day: 0.00     Average packs/day: 0.5 packs/day for 25.8 years (12.9 ttl pk-yrs)     Types: Cigarettes     Start date:      Quit date: 10/13/2013     Years since quittin.2     Passive exposure: Past    Smokeless tobacco: Never    Tobacco comments:     quit 2013   Vaping Use    Vaping status: Never Used   Substance and Sexual Activity    Alcohol use: Not Currently    Drug use: No    Sexual activity: Not Currently     Partners: Female       Family History   Problem Relation Age of Onset    Breast cancer Mother     Dementia Father     Glaucoma Father     No Known Problems Daughter     Colon polyps Neg Hx     Colon cancer Neg Hx        Review of Systems: A 12 system review of systems was completed and is negative except stated in HPI.     Objective   /54 (BP Location: Left arm, Patient Position: Lying)   Pulse 54   Temp 98.5 °F (36.9 °C) (Oral)   Resp 16   Ht 182.9 cm (72\")   Wt 64.3 kg (141 lb 12.8 oz)   SpO2 94%   BMI 19.23 kg/m²     Physical Exam:  Constitutional:       Appearance: Well-developed.   HENT:      Head: Normocephalic and atraumatic.   Neck:      Vascular: JVD present.   Pulmonary:      Effort: Pulmonary effort is normal.      Breath sounds: Normal breath sounds.   Cardiovascular:      Normal rate. Regular rhythm.      Murmurs: There is a grade 1/6 systolic murmur.   Skin:     General: Skin is warm and dry.   Neurological:      Mental Status: Alert and oriented to person, place, and time.         Lab Results   Component Value Date    CKTOTAL 18 (L) 2020    TROPONINT 62 (C) 01/10/2025 " "    Lab Results   Component Value Date    GLUCOSE 104 (H) 01/12/2025    CALCIUM 8.2 (L) 01/12/2025     01/12/2025    K 4.1 01/12/2025    CO2 19.0 (L) 01/12/2025     (H) 01/12/2025    BUN 76 (H) 01/12/2025    CREATININE 3.72 (H) 01/12/2025    EGFRIFAFRI  10/29/2021      Comment:      <15 Indicative of kidney failure.    EGFRIFNONA 23 (L) 02/15/2022    BCR 20.4 01/12/2025    ANIONGAP 12.0 01/12/2025     Lab Results   Component Value Date    WBC 5.58 01/12/2025    HGB 8.8 (L) 01/12/2025    HCT 28.6 (L) 01/12/2025    .8 (H) 01/12/2025    PLT 94 (L) 01/12/2025     Lab Results   Component Value Date    CHOL 90 05/22/2024    CHOL 59 10/29/2021     Lab Results   Component Value Date    TRIG 57 05/22/2024    TRIG 80 01/27/2023    TRIG 97 10/29/2021     Lab Results   Component Value Date    HDL 37 (L) 05/22/2024    HDL 40 01/27/2023    HDL 23 (L) 10/29/2021     No components found for: \"LDLCALC\"  Lab Results   Component Value Date    LDL 40 05/22/2024    LDL 45 01/27/2023    LDL 17 10/29/2021     -Three-vessel CABG (2003)  -Echo (10/5/2022) EF 61 to 65%, mild LVH, grade 2 diastolic dysfunction, mild MR, mild RV dilation with normal systolic function, biatrial enlargement, mild to moderate TR, RVSP 45 mmHg  -Chest x-ray (1/10/2025) small to moderate right-sided and trace left-sided pleural effusion, mild interstitial opacities suggestive of pulmonary edema  -CT chest (1/11/2025) large right and small left pleural effusion, near complete collapse of right lower lobe and partial collapse of right middle lobe, cannot rule out pneumonia, mediastinal lymphadenopathy, cardiomegaly, small amount of ascites in the liver and spleen  -Echo (1/11/2025) EF 56-60%, mild asymmetric septal hypertrophy, grade 2 diastolic dysfunction, normal RV size and function, biatrial enlargement, mild aortic stenosis, mild to moderate MR, moderate to severe TR, RVSP > 55 mmHg      Assessment:  1.  Acute on chronic diastolic CHF: EF 55 " to 60% with grade 2 diastolic dysfunction.  Evidence of pulmonary edema on chest x-ray and CT chest.  proBNP is elevated at 32,562.  2.  CKD stage IV: Both BUN and creatinine is trending up with diuresis.  3.  Coronary artery disease: History of three-vessel CABG in 2003.  No current chest pain symptoms.  4.  Elevated troponin: High-sensitivity troponin elevated at 65 with repeat down to 62.  No chest pain symptoms as mentioned above.  No acute EKG changes.  Type II nonischemic myocardial injury secondary to CHF and CKD.  5.  Sinus bradycardia: Stable in the 60s.  6.  Essential hypertension: Blood pressure has been elevated.  7.  Pneumonia: Recent diagnosis.  Pneumonia could not be excluded on CT chest.      Plan:  -Continue diuresis with IV Lasix.  -Monitor renal function and electrolytes closely.  -Nephrology has also been consulted.  Appreciate their assistance with managing diuretics.   -Titrate up nifedipine to 120 mg.  -Start Jardiance  -Continue aspirin, Plavix, and atorvastatin.  -No beta-blocker secondary to resting bradycardia.

## 2025-01-12 NOTE — PLAN OF CARE
Goal Outcome Evaluation:           Progress: improving  Outcome Evaluation: AOX4 VSS BP improved this evening. Echo done EF 56-60%. No edema noted. O2 at 2l per cannula sat's wnl. Good UOP. SB 44-53 per tele.

## 2025-01-12 NOTE — PLAN OF CARE
Problem: Adult Inpatient Plan of Care  Goal: Plan of Care Review  Outcome: Progressing  Flowsheets (Taken 1/12/2025 0324)  Progress: no change  Goal: Patient-Specific Goal (Individualized)  Outcome: Progressing  Goal: Absence of Hospital-Acquired Illness or Injury  Outcome: Progressing  Intervention: Identify and Manage Fall Risk  Recent Flowsheet Documentation  Taken 1/11/2025 1930 by Priscila Cooper RN  Safety Promotion/Fall Prevention: safety round/check completed  Intervention: Prevent Skin Injury  Recent Flowsheet Documentation  Taken 1/11/2025 1930 by Priscila Cooper RN  Body Position: position changed independently  Goal: Optimal Comfort and Wellbeing  Outcome: Progressing  Goal: Readiness for Transition of Care  Outcome: Progressing     Problem: Heart Failure  Goal: Optimal Coping  Outcome: Progressing  Goal: Optimal Cardiac Output and Blood Flow  Outcome: Progressing  Goal: Stable Heart Rate and Rhythm  Outcome: Progressing  Goal: Fluid and Electrolyte Balance  Outcome: Progressing  Goal: Optimal Functional Ability  Outcome: Progressing  Goal: Improved Oral Intake  Outcome: Progressing  Goal: Effective Oxygenation and Ventilation  Outcome: Progressing  Intervention: Promote Airway Secretion Clearance  Recent Flowsheet Documentation  Taken 1/11/2025 1930 by Priscila Cooper RN  Cough And Deep Breathing: done independently per patient  Goal: Effective Breathing Pattern During Sleep  Outcome: Progressing     Problem: Comorbidity Management  Goal: Maintenance of Heart Failure Symptom Control  Outcome: Progressing  Goal: Blood Pressure in Desired Range  Outcome: Progressing     Problem: Fall Injury Risk  Goal: Absence of Fall and Fall-Related Injury  Outcome: Progressing  Intervention: Promote Injury-Free Environment  Recent Flowsheet Documentation  Taken 1/11/2025 1930 by Priscila Cooper RN  Safety Promotion/Fall Prevention: safety round/check completed   Goal Outcome Evaluation:           Progress: no  change

## 2025-01-12 NOTE — PLAN OF CARE
Goal Outcome Evaluation:  Plan of Care Reviewed With: patient        Progress: no change  Outcome Evaluation: PT screen completed. Per OT and chart review, pt at baseline and has no need for skilled PT at this time. PT to sign off.

## 2025-01-12 NOTE — PLAN OF CARE
Goal Outcome Evaluation:  Plan of Care Reviewed With: patient        Progress: improving  Outcome Evaluation: AOX4 VSS Bp improving this evening. Meds adjusted per MD. Jardiance added tonight. IV Lasix with good UOP. Renal US completed. SB/SA 48-56 per tele.

## 2025-01-12 NOTE — PROGRESS NOTES
[facility] Medicine Services  INPATIENT PROGRESS NOTE    Patient Name: Ricardo Hugo  Date of Admission: 1/10/2025  Today's Date: 01/12/25  Length of Stay: 2  Primary Care Physician: Nathan Zimmer MD    Subjective   Chief Complaint: Progressive shortness of breath  Shortness of Breath    Cough  Associated symptoms include shortness of breath.        sandie Hugo is a 74-year-old male with a past medical history of diastolic heart failure, coronary artery disease with history of bypass, Crohn's disease, chronic kidney disease stage IV, hypertension, anemia of chronic disease, please see below for complete list.  Patient presents to [facility] ED with complaints of shortness of breathing.  He reported home oxygen saturation in the mid 80s with ambulation.  At rest in the emergency department he was 88/89% on room air.  Patient does not normally wear oxygen.  Currently he is on 2 L.  Patient treated for pneumonia per PCP with Augmentin and azithromycin 12/24/2024 based on x-ray, right lower lobe.  Due to ongoing symptoms he was started on cefdinir 300 mg daily 1/8/2025 x 5 doses.  ER workup reveals acute heart failure.  pCO2 64.5, pH 7.35, pCO2 32.3, saturation 92.5%, troponins 65 followed with 62 with a -5 delta, proBNP 32,562, creatinine 3.4 at baseline, hemoglobin 9 at baseline and platelets 80,000.  Blood cultures obtained. Respiratory panel negative.  Chest x-ray revealed Small to moderate right and trace left pleural effusions with mild interstitial opacities, suggestive of pulmonary edema.  EKG and monitor revealed bradycardia low 40s at time of my assessment 41.  Patient states he is chronically low however I will hold beta-blocker for now.  Condition is stable.  Patient is extremely weak.  He denies chest pain.  He states shortness of breathing has improved with oxygen.  He is admitted for further evaluation treatment.   1/11  Before history of hypertension diastolic  CHF chronic renal failure presented with shortness of breath pulmonary edema, is on Lasix echo of the heart is pending, beta  blocker was put on hold secondary to bradycardia we will DC Diovan with creatinine 3.4, blood pressure remains a challenge  1/12  As before echo of the heart showing EF to be 55 to 60% pressure remains poorly controlled we will consult with cardiology nephrology has been consulted to for chronic renal failure  Review of Systems   Respiratory:  Positive for cough and shortness of breath.       All pertinent negatives and positives are as above. All other systems have been reviewed and are negative unless otherwise stated.     Objective    Temp:  [97.9 °F (36.6 °C)-98.6 °F (37 °C)] 98.1 °F (36.7 °C)  Heart Rate:  [51-54] 53  Resp:  [18-22] 18  BP: (148-194)/(48-68) 194/66  Physical Exam  Constitutional:       Appearance: Normal appearance.   HENT:      Head: Normocephalic.      Nose: Nose normal.   Eyes:      Pupils: Pupils are equal, round, and reactive to light.   Cardiovascular:      Rate and Rhythm: Normal rate.   Pulmonary:      Effort: Respiratory distress present.      Breath sounds: Wheezing present.   Abdominal:      General: Abdomen is flat.   Musculoskeletal:      Cervical back: Normal range of motion.   Skin:     Capillary Refill: Capillary refill takes less than 2 seconds.   Neurological:      Mental Status: He is alert.             Results Review:  I have reviewed the labs, radiology results, and diagnostic studies.    Laboratory Data:   Results from last 7 days   Lab Units 01/12/25  0401 01/11/25  0522 01/10/25  1508   WBC 10*3/mm3 5.58 4.38 5.17   HEMOGLOBIN g/dL 8.8* 9.7* 9.0*   HEMATOCRIT % 28.6* 30.8* 28.6*   PLATELETS 10*3/mm3 94* 102* 80*        Results from last 7 days   Lab Units 01/12/25  0401 01/11/25  0522 01/10/25  1508   SODIUM mmol/L 139 139 139   POTASSIUM mmol/L 4.1 4.3 4.4   CHLORIDE mmol/L 108* 107 104   CO2 mmol/L 19.0* 17.0* 17.0*   BUN mg/dL 76* 63* 58*  "  CREATININE mg/dL 3.72* 3.52* 3.41*   CALCIUM mg/dL 8.2* 8.5* 8.6   BILIRUBIN mg/dL 0.3  --  0.4   ALK PHOS U/L 133*  --  142*   ALT (SGPT) U/L 8  --  12   AST (SGOT) U/L 17  --  26   GLUCOSE mg/dL 104* 115* 85       Culture Data:   No results found for: \"BLOODCX\", \"URINECX\", \"WOUNDCX\", \"MRSACX\", \"RESPCX\", \"STOOLCX\"    Radiology Data:   Imaging Results (Last 24 Hours)       ** No results found for the last 24 hours. **            I have reviewed the patient's current medications.     Assessment/Plan   Assessment  Active Hospital Problems    Diagnosis     **Acute on chronic diastolic congestive heart failure     Acute respiratory failure with hypoxia     Bradycardia     History of pneumonia, current treatment with cefdinir     CKD (chronic kidney disease) stage 4, GFR 15-29 ml/min     Essential hypertension        Treatment Plan   1.  Acute on chronic diastolic heart failure, initiate heart failure pathway, Lasix 40 mg twice daily for now, echocardiogram in a.m.     2.  Acute respiratory failure with hypoxia, supplemental oxygen, ABGs as needed, incentive spirometry, CT of the chest without contrast in a.m.     3.  Bradycardia, hold home beta-blocker, routine telemetry orders, continuous cardiac monitoring     4.  Recent diagnosis pneumonia currently being treated with cefdinir previously 12/24/2024 treated with Augmentin and azithromycin, CT of the chest in a.m. without contrast, hold cefdinir for now     5.  CKD stage IV, BMP in a.m. monitor closely with need for diuresis, consider nephrology consult if kidney function worsens     6.  Essential hypertension, continue multiple home antihypertensive agents, monitor vital signs per protocol     7.  DVT prophylaxis with SCDs, home medications reviewed and restarted as appropriate, labs in a.m., consult PT and OT for strengthening        Medical Decision Making  Number and Complexity of problems: 6  3 problems, acute, high complexity, unchanged  2 problems, chronic, " high complexity, unchanged  Differential Diagnosis: None     Conditions and Status        Condition is unchanged.     [facility] Data  External documents reviewed: Epic records  Cardiac tracing (EKG, telemetry) interpretation: Reviewed  Radiology interpretation: Reviewed  Labs reviewed: Reviewed  Any tests that were considered but not ordered: No     Decision rules/scores evaluated (example [id number], Wells, etc): No     Discussed with: Patient and Dr. Miramontes     Care Planning  Shared decision making: Patient and Dr. Miramontes  Code status and discussions: Full     Disposition  Social Determinants of Health that impact treatment or disposition: None  Estimated length of stay is 2+ days.      I confirmed that the patient's advanced care plan is present, code status is documented, and a surrogate decision maker is listed in the patient's medical record.      The patient's surrogate decision maker is children        Electronically signed by Vidal Miramontes MD, 01/12/25, 11:09 CST.

## 2025-01-12 NOTE — CONSULTS
Nephrology ([facility]) Consult Note      Patient:  Ricardo Hugo  YOB: 1950  Date of Service: 1/12/2025  MRN: [id number]   Acct: [id number]   Primary Care Physician: Nathan Zimmer MD  Advance Directive:   Code Status and Medical Interventions: CPR (Attempt to Resuscitate); Full Support   Ordered at: 01/10/25 1631     Code Status (Patient has no pulse and is not breathing):    CPR (Attempt to Resuscitate)     Medical Interventions (Patient has pulse or is breathing):    Full Support     Admit Date: 1/10/2025       Hospital Day: 2  Referring Provider: Vidal Miramontes MD      Patient Seen, Chart, Consults, Notes, Labs, Radiology studies reviewed.      Subjective:  Ricardo Hugo is a 74 y.o. male  whom we were consulted for CKD stage 4.  Patient has a history of hypertension, Crohn's disease, coronary artery disease, diastolic heart failure and CKD now stage IV.  He is followed by MERY Freeman at our renal office.  Patient has been stable.  He has no dysuria.  He is currently admitted after he experienced worsening dyspnea.  He was diagnosed with congestive heart failure exacerbation.  He is currently managed with diuretics and has been having excellent urine output.  Patient has noticed some improved respiratory status since admission.  He has no dysuria.  His serum creatinine was ranging at 3.7 with GFR at 16.  His baseline serum creatinine is around 3.5.  Patient has no leg edema.  He denies dysuria.  He also denies NSAIDs abuse.    Allergies:  Ondansetron, Zofran [ondansetron hcl], Lortab [hydrocodone-acetaminophen], and Allopurinol    Home Meds:  Facility-Administered Medications Prior to Admission   Medication Dose Route Frequency Provider Last Rate Last Admin    cyanocobalamin injection 1,000 mcg  1,000 mcg Intramuscular Q28 Days Fidel, Lancaster C, APRN   1,000 mcg at 08/11/23 1123     Medications Prior to Admission   Medication Sig Dispense Refill  Last Dose/Taking    albuterol sulfate  (90 Base) MCG/ACT inhaler Inhale 2 puffs 4 (Four) Times a Day As Needed for Wheezing or Shortness of Air.   Past Week    ALPRAZolam (XANAX) 0.25 MG tablet Take 1 tablet by mouth Every Night.   Past Week    aspirin (aspirin) 81 MG EC tablet Take 1 tablet by mouth Daily.   Past Week    atorvastatin (LIPITOR) 10 MG tablet Take 1 tablet by mouth Daily. 90 tablet 3 Past Week    Budeson-Glycopyrrol-Formoterol (Breztri Aerosphere) 160-9-4.8 MCG/ACT aerosol inhaler Inhale 2 puffs 2 (Two) Times a Day. 1 each 3 Past Week    carvedilol (COREG) 25 MG tablet Take 1 tablet by mouth 2 (Two) Times a Day With Meals. 180 tablet 3 Past Week    cefdinir (OMNICEF) 300 MG capsule Take 1 capsule by mouth Daily. 5 capsule 0 Past Week    cholestyramine light 4 g packet Take 1 packet by mouth Daily.   Past Week    cloNIDine (CATAPRES) 0.3 MG tablet Take 1 tablet by mouth 3 (Three) Times a Day. 270 tablet 2 Past Week    cloNIDine (CATAPRES-TTS) 0.2 MG/24HR patch Place 1 patch on the skin as directed by provider 1 (One) Time Per Week. THURSDAYS   Past Week    clopidogrel (PLAVIX) 75 MG tablet Take 1 tablet by mouth Daily.   Past Week    Copper Gluconate (Copper Caps) 2 MG capsule Take 2 mg by mouth Daily.   Past Week    docusate sodium (COLACE) 100 MG capsule Take 1 capsule by mouth 3 (Three) Times a Day As Needed for Constipation.   Past Week    fexofenadine (ALLEGRA) 180 MG tablet Take 1 tablet by mouth Daily.   Past Week    fluticasone (FLONASE) 50 MCG/ACT nasal spray Administer 2 sprays into the nostril(s) as directed by provider Daily.   Past Week    furosemide (Lasix) 20 MG tablet Take 1 tablet by mouth 3 (Three) Times a Day. 270 tablet 3 Past Week    hydrALAZINE (APRESOLINE) 100 MG tablet Take 1 tablet by mouth 3 (Three) Times a Day. 100mg daily   Past Week    levothyroxine (Synthroid) 75 MCG tablet Take 1 tablet by mouth Daily. 90 tablet 3 Past Week    Magnesium Cl-Calcium Carbonate  (SLOW-MAG PO) Take 143 mg by mouth Daily.   Past Week    Melatonin 10 MG capsule Take 3 capsules by mouth Every Night.   Past Week    mesalamine (APRISO) 0.375 g 24 hr capsule Take 4 capsules by mouth Daily.   Past Week    montelukast (SINGULAIR) 10 MG tablet Take 1 tablet by mouth Every Night.   Past Week    Multiple Vitamins-Minerals (PRESERVISION/LUTEIN) capsule Take 1 capsule by mouth 2 (two) times a day.   Past Week    NIFEdipine XL (PROCARDIA XL) 90 MG 24 hr tablet Take 1 tablet by mouth Daily.   Past Week    omeprazole (priLOSEC) 20 MG capsule Take 1 capsule by mouth Daily.   Past Week    sodium bicarbonate 650 MG tablet Take 5 tablets by mouth Daily. 2 qam, 1 at noon, and 2 qpm   Past Week    spironolactone (ALDACTONE) 25 MG tablet Take 1 tablet by mouth Daily. 90 tablet 2 Past Week    tamsulosin (FLOMAX) 0.4 MG capsule 24 hr capsule Take 1 capsule by mouth Every Night. 90 capsule 3 Past Week    valsartan (DIOVAN) 320 MG tablet Take 1 tablet by mouth Daily.   Past Week    vitamin D (ERGOCALCIFEROL) 1.25 MG (28806 UT) capsule capsule Take 1 capsule by mouth 1 (One) Time Per Week.   Past Week       Medicines:  Current Facility-Administered Medications   Medication Dose Route Frequency Provider Last Rate Last Admin    acetaminophen (TYLENOL) tablet 650 mg  650 mg Oral Q4H PRN Katherine Camarena APRN        Or    acetaminophen (TYLENOL) 160 MG/5ML oral solution 650 mg  650 mg Oral Q4H PRN Katherine Camarena APRN        Or    acetaminophen (TYLENOL) suppository 650 mg  650 mg Rectal Q4H PRN Katherine Camarena APRN        aspirin EC tablet 81 mg  81 mg Oral Daily Katherine Camarena APRN   81 mg at 01/12/25 0816    atorvastatin (LIPITOR) tablet 10 mg  10 mg Oral Daily Katherine Camarena APRN   10 mg at 01/12/25 0816    sennosides-docusate (PERICOLACE) 8.6-50 MG per tablet 2 tablet  2 tablet Oral BID PRN Katherine Camarena APRN        And    polyethylene glycol (MIRALAX) packet 17 g  17 g Oral Daily PRN Migue  STERLING Hargrove        And    bisacodyl (DULCOLAX) EC tablet 5 mg  5 mg Oral Daily PRN Katherine Camarena APRN        And    bisacodyl (DULCOLAX) suppository 10 mg  10 mg Rectal Daily PRN Katherine Camarena APRN        cetirizine (zyrTEC) tablet 10 mg  10 mg Oral Daily Katherine Camarena, APRN   10 mg at 01/12/25 0816    cholestyramine (QUESTRAN) packet 1 packet  1 packet Oral Daily Katherine Camarena APRN   1 packet at 01/12/25 0816    cloNIDine (CATAPRES) tablet 0.3 mg  0.3 mg Oral TID Vidal Miramontes MD   0.3 mg at 01/12/25 0817    clopidogrel (PLAVIX) tablet 75 mg  75 mg Oral Daily Katherine Camarena APRN   75 mg at 01/12/25 0817    docusate sodium (COLACE) capsule 100 mg  100 mg Oral TID Katherine Camarena APRN   100 mg at 01/12/25 0817    fluticasone (FLONASE) 50 MCG/ACT nasal spray 2 spray  2 spray Each Nare Daily Katherine Camarena APRN   2 spray at 01/12/25 0818    furosemide (LASIX) injection 40 mg  40 mg Intravenous BID Diuretics Katherine Camarena APRN   40 mg at 01/12/25 0816    hydrALAZINE (APRESOLINE) injection 10 mg  10 mg Intravenous Q4H PRN Vidal Miramontes MD   10 mg at 01/11/25 0544    hydrALAZINE (APRESOLINE) tablet 100 mg  100 mg Oral TID Katherine Camarena APRN   100 mg at 01/12/25 0817    levothyroxine (SYNTHROID, LEVOTHROID) tablet 75 mcg  75 mcg Oral Q AM Katherine Camarena APRN   75 mcg at 01/12/25 0552    melatonin tablet 10 mg  10 mg Oral Nightly Katherine Camarena APRN   10 mg at 01/11/25 2142    mesalamine (APRISO) 24 hr capsule 1.5 g  1.5 g Oral Daily Katherine Camarena, APRN   1.5 g at 01/12/25 0816    montelukast (SINGULAIR) tablet 10 mg  10 mg Oral Nightly Katherine Camarena APRN   10 mg at 01/11/25 2142    NIFEdipine XL (PROCARDIA XL) 24 hr tablet 90 mg  90 mg Oral Daily Katherine Camarena APRN   90 mg at 01/12/25 0816    nitroglycerin (NITROSTAT) SL tablet 0.4 mg  0.4 mg Sublingual Q5 Min PRN Katherine Camarena, STERLING        pantoprazole (PROTONIX) EC tablet 40 mg  40 mg Oral Q AM  Katherine Camarena APRN   40 mg at 01/12/25 0552    promethazine (PHENERGAN) tablet 12.5 mg  12.5 mg Oral Q6H PRN Katherine Camarena APRN        sodium bicarbonate tablet 1,300 mg  1,300 mg Oral Q12H Katherine Camarena, APRN   1,300 mg at 01/12/25 0817    sodium bicarbonate tablet 650 mg  650 mg Oral Daily With Lunch Katherine Camarena APRN   650 mg at 01/12/25 1226    sodium chloride 0.9 % flush 10 mL  10 mL Intravenous Q12H Katherine Camarena, APRN   10 mL at 01/12/25 0817    sodium chloride 0.9 % flush 10 mL  10 mL Intravenous PRN Katherine Camarena APRN        sodium chloride 0.9 % infusion 40 mL  40 mL Intravenous PRN Katherine Camarena APRN        tamsulosin (FLOMAX) 24 hr capsule 0.4 mg  0.4 mg Oral Nightly Katherine Camarena APRN   0.4 mg at 01/11/25 2142    valsartan (DIOVAN) tablet 320 mg  320 mg Oral Daily Katherine Camarena APRN   320 mg at 01/12/25 0817       Past Medical History:  Past Medical History:   Diagnosis Date    3-vessel CAD 8/11/2020    Allergic rhinitis     Anxiety disorder 4/27/2020    Arthritis     Asymmetrical sensorineural hearing loss 6/28/2017    Atherosclerosis of native artery of both lower extremities with intermittent claudication 7/18/2019    Avascular necrosis of femoral head, left 07/11/2020    right hip after surgery    Carotid stenosis     Chronic mucoid otitis media     Chronic rhinitis     Coronary artery disease     HEART BYPASS 2004    Crohn's disease of large intestine with other complication 7/30/2020    Chronic diarrhea Colonoscopy July 2020 revealed mild patchy scattered hemosiderin staining with inflammation more so in rectosigmoid area.  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    Iron deficiency anemia 7/14/2020    Mixed hearing loss of left ear     PAD (peripheral artery disease) 8/11/2020    Perianal abscess     Pernicious anemia 08/17/2020    took shots but never diagnosed with b12 deficiency    Personal history of alcoholism 08/11/2020    quit drinking in 2013    Prostatic hypertrophy 8/11/2020    Sensorineural hearing loss     Sepsis with acute renal failure 9/15/2020    Shortness of breath 5/27/2021    Tinnitus     Ventricular tachycardia, nonsustained 7/14/2020    Weight loss 7/11/2020       Past Surgical History:  Past Surgical History:   Procedure Laterality Date    ARTERY SURGERY  2021    right side on neck    CAROTID ENDARTERECTOMY Right 5/10/2021    Procedure: RIGHT CAROTID ENDARTERECTOMY WITH EEG;  Surgeon: Gil Pineda DO;  Location: [facility] OR ;  Service: Vascular;  Laterality: Right;    COLONOSCOPY N/A 7/2/2020    Procedure: COLONOSCOPY WITH ANESTHESIA;  Surgeon: Adrien Brewster MD;  Location: [facility] ENDOSCOPY;  Service: Gastroenterology;  Laterality: N/A;  pre op: diarrhea  post op: polyps  PCP: Joe Velasco MD    COLONOSCOPY N/A 10/13/2020    Procedure: COLONOSCOPY WITH ANESTHESIA;  Surgeon: Adrien Brewster MD;  Location: [facility] ENDOSCOPY;  Service: Gastroenterology;  Laterality: N/A;  Pre: Chronic Diarrhea, Crohn's  Post: AVM  Dr. Neftali Velasco  CO2 Inflation Used    COLONOSCOPY N/A 12/8/2023    Procedure: COLONOSCOPY WITH ANESTHESIA;  Surgeon: Adrien Brewster MD;  Location: [facility] ENDOSCOPY;  Service: Gastroenterology;  Laterality: N/A;  pre chrone's disease  post sub optimal prep, polyp, chrone's      CORONARY ARTERY BYPASS GRAFT  2003    x3    ENDOSCOPY N/A 11/2/2021    Procedure: ESOPHAGOGASTRODUODENOSCOPY WITH ANESTHESIA;  Surgeon: Bridger Bell MD;  Location: [facility] ENDOSCOPY;  Service: Gastroenterology;  Laterality: N/A;  pre anemia;gi bleed  post  gi bleed;fabian  wade Velasco    ENDOSCOPY N/A 10/10/2023    Procedure: ESOPHAGOGASTRODUODENOSCOPY WITH ANESTHESIA;  Surgeon: Adrien Brewster MD;  Location:  [facility] ENDOSCOPY;  Service: Gastroenterology;  Laterality: N/A;  preop; anemia  postop esophagitis ; R/O barretts   PCP Randall Beata    EYE SURGERY Bilateral     catorac    INCISION AND DRAINAGE PERIRECTAL ABSCESS N/A 3/3/2017    Procedure: INCISION AND DRAINAGE OF JEET ANAL ABSCESS;  Surgeon: Lynette Smith MD;  Location:  [facility] OR;  Service:     INGUINAL HERNIA REPAIR Bilateral 2023    Procedure: INGUINAL HERNIA BILATERAL REPAIR LAPAROSCOPIC WITH DAVINCI ROBOT WITH MESH;  Surgeon: Tahira Rivera MD;  Location:  [facility] OR;  Service: Robotics - DaVinci;  Laterality: Bilateral;    MYRINGOTOMY W/ TUBES Left 2017    06/10/2016    TONSILLECTOMY      TOTAL HIP ARTHROPLASTY Right        Family History  Family History   Problem Relation Age of Onset    Breast cancer Mother     Dementia Father     Glaucoma Father     No Known Problems Daughter     Colon polyps Neg Hx     Colon cancer Neg Hx        Social History  Social History     Socioeconomic History    Marital status:    Tobacco Use    Smoking status: Former     Current packs/day: 0.00     Average packs/day: 0.5 packs/day for 25.8 years (12.9 ttl pk-yrs)     Types: Cigarettes     Start date:      Quit date: 10/13/2013     Years since quittin.2     Passive exposure: Past    Smokeless tobacco: Never    Tobacco comments:     quit    Vaping Use    Vaping status: Never Used   Substance and Sexual Activity    Alcohol use: Not Currently    Drug use: No    Sexual activity: Not Currently     Partners: Female         Review of Systems:  History obtained from chart review and the patient  General ROS: No fever or chills  Respiratory ROS: No cough, +shortness of breath,- wheezing  Cardiovascular ROS: no chest pain but dyspnea on exertion  Gastrointestinal ROS: No abdominal pain or  "melena  Genito-Urinary ROS: No dysuria or hematuria  14 point ROS reviewed with the patient and negative except as noted above and in the HPI unless unable to obtain.    Objective:  /54 (BP Location: Left arm, Patient Position: Lying)   Pulse 54   Temp 98.5 °F (36.9 °C) (Oral)   Resp 16   Ht 182.9 cm (72\")   Wt 64.3 kg (141 lb 12.8 oz)   SpO2 94%   BMI 19.23 kg/m²     Intake/Output Summary (Last 24 hours) at 1/12/2025 1600  Last data filed at 1/12/2025 1400  Gross per 24 hour   Intake 480 ml   Output 1950 ml   Net -1470 ml     General: awake/alert   Head: Atraumatic, normocephalic  Neck: No masses, mild JVD  Chest: Bilateral air entry with diminished breath sounds at the bases with scattered rales  CVS: regular rate and rhythm, soft systolic murmur  Abdominal: soft, nontender, normal bowel sounds  Extremities: no cyanosis or edema  Skin: warm and dry without rash  Neuro: No focal motor deficits  Musculoskeletal: No obvious joint effusions    Labs:  Lab Results (last 7 days)       Procedure Component Value Units Date/Time    Blood Culture - Blood, Arm, Right [[id number]]  (Normal) Collected: 01/10/25 1508    Specimen: Blood from Arm, Right Updated: 01/12/25 1531     Blood Culture No growth at 2 days    Blood Culture - Blood, Arm, Right [[id number]]  (Normal) Collected: 01/10/25 1440    Specimen: Blood from Arm, Right Updated: 01/12/25 1500     Blood Culture No growth at 2 days    Sodium, Urine, Random - Urine, Clean Catch [[id number]] Collected: 01/12/25 1100    Specimen: Urine, Clean Catch Updated: 01/12/25 1130     Sodium, Urine 113 mmol/L     Narrative:      Reference intervals for random urine have not been established.  Clinical usage is dependent upon physician's interpretation in combination with other laboratory tests.       Protein, Urine, Random - Urine, Clean Catch [[id number]] Collected: 01/12/25 1100    Specimen: Urine, Clean Catch Updated: 01/12/25 1130     Total Protein, Urine 50.3 mg/dL     " Narrative:      Reference intervals for random urine have not been established.  Clinical usage is dependent upon physician's interpretation in combination with other laboratory tests.       Osmolality, Urine - Urine, Clean Catch [[id number]]  (Normal) Collected: 01/12/25 1100    Specimen: Urine, Clean Catch Updated: 01/12/25 1123     Osmolality, Urine 333 mOsm/kg     Creatinine Urine Random (kidney function) GFR component - Urine, Clean Catch [[id number]] Collected: 01/12/25 1100    Specimen: Urine, Clean Catch Updated: 01/12/25 1108    Eosinophil Smear - Urine, Urine, Clean Catch [[id number]] Collected: 01/12/25 1100    Specimen: Urine, Clean Catch Updated: 01/12/25 1108    Comprehensive Metabolic Panel [[id number]]  (Abnormal) Collected: 01/12/25 0401    Specimen: Blood Updated: 01/12/25 0508     Glucose 104 mg/dL      BUN 76 mg/dL      Creatinine 3.72 mg/dL      Sodium 139 mmol/L      Potassium 4.1 mmol/L      Chloride 108 mmol/L      CO2 19.0 mmol/L      Calcium 8.2 mg/dL      Total Protein 5.7 g/dL      Albumin 3.4 g/dL      ALT (SGPT) 8 U/L      AST (SGOT) 17 U/L      Alkaline Phosphatase 133 U/L      Total Bilirubin 0.3 mg/dL      Globulin 2.3 gm/dL      A/G Ratio 1.5 g/dL      BUN/Creatinine Ratio 20.4     Anion Gap 12.0 mmol/L      eGFR 16.1 mL/min/1.73     Narrative:      GFR Categories in Chronic Kidney Disease (CKD)      GFR Category          GFR (mL/min/1.73)    Interpretation  G1                     90 or greater         Normal or high (1)  G2                      60-89                Mild decrease (1)  G3a                   45-59                Mild to moderate decrease  G3b                   30-44                Moderate to severe decrease  G4                    15-29                Severe decrease  G5                    14 or less           Kidney failure          (1)In the absence of evidence of kidney disease, neither GFR category G1 or G2 fulfill the criteria for CKD.    eGFR calculation 2021  CKD-EPI creatinine equation, which does not include race as a factor    CBC & Differential [[id number]]  (Abnormal) Collected: 01/12/25 0401    Specimen: Blood Updated: 01/12/25 0504    Narrative:      The following orders were created for panel order CBC & Differential.  Procedure                               Abnormality         Status                     ---------                               -----------         ------                     CBC Auto Differential[[id number]]        Abnormal            Final result                 Please view results for these tests on the individual orders.    CBC Auto Differential [[id number]]  (Abnormal) Collected: 01/12/25 0401    Specimen: Blood Updated: 01/12/25 0504     WBC 5.58 10*3/mm3      RBC 2.73 10*6/mm3      Hemoglobin 8.8 g/dL      Hematocrit 28.6 %      .8 fL      MCH 32.2 pg      MCHC 30.8 g/dL      RDW 16.3 %      RDW-SD 62.5 fl      MPV 10.2 fL      Platelets 94 10*3/mm3      Neutrophil % 69.9 %      Lymphocyte % 14.5 %      Monocyte % 11.1 %      Eosinophil % 2.9 %      Basophil % 0.7 %      Immature Grans % 0.9 %      Neutrophils, Absolute 3.90 10*3/mm3      Lymphocytes, Absolute 0.81 10*3/mm3      Monocytes, Absolute 0.62 10*3/mm3      Eosinophils, Absolute 0.16 10*3/mm3      Basophils, Absolute 0.04 10*3/mm3      Immature Grans, Absolute 0.05 10*3/mm3      nRBC 0.0 /100 WBC     Basic Metabolic Panel [[id number]]  (Abnormal) Collected: 01/11/25 0522    Specimen: Blood Updated: 01/11/25 0603     Glucose 115 mg/dL      BUN 63 mg/dL      Creatinine 3.52 mg/dL      Sodium 139 mmol/L      Potassium 4.3 mmol/L      Chloride 107 mmol/L      CO2 17.0 mmol/L      Calcium 8.5 mg/dL      BUN/Creatinine Ratio 17.9     Anion Gap 15.0 mmol/L      eGFR 17.2 mL/min/1.73     Narrative:      GFR Categories in Chronic Kidney Disease (CKD)      GFR Category          GFR (mL/min/1.73)    Interpretation  G1                     90 or greater         Normal or high (1)  G2                       60-89                Mild decrease (1)  G3a                   45-59                Mild to moderate decrease  G3b                   30-44                Moderate to severe decrease  G4                    15-29                Severe decrease  G5                    14 or less           Kidney failure          (1)In the absence of evidence of kidney disease, neither GFR category G1 or G2 fulfill the criteria for CKD.    eGFR calculation 2021 CKD-EPI creatinine equation, which does not include race as a factor    CBC (No Diff) [[id number]]  (Abnormal) Collected: 01/11/25 0522    Specimen: Blood Updated: 01/11/25 0544     WBC 4.38 10*3/mm3      RBC 3.02 10*6/mm3      Hemoglobin 9.7 g/dL      Hematocrit 30.8 %      .0 fL      MCH 32.1 pg      MCHC 31.5 g/dL      RDW 16.1 %      RDW-SD 60.9 fl      MPV 10.2 fL      Platelets 102 10*3/mm3     High Sensitivity Troponin T 1Hr [[id number]]  (Abnormal) Collected: 01/10/25 1613    Specimen: Blood Updated: 01/10/25 1641     HS Troponin T 62 ng/L      Troponin T Numeric Delta -3 ng/L      Troponin T % Delta -5 %     Narrative:      High Sensitive Troponin T Reference Range:  <14.0 ng/L- Negative Female for AMI  <22.0 ng/L- Negative Male for AMI  >=14 - Abnormal Female indicating possible myocardial injury.  >=22 - Abnormal Male indicating possible myocardial injury.   Clinicians would have to utilize clinical acumen, EKG, Troponin, and serial changes to determine if it is an Acute Myocardial Infarction or myocardial injury due to an underlying chronic condition.         Respiratory Panel PCR w/COVID-19(SARS-CoV-2) [facility] In-House, NP Swab in [facility], 2 HR TAT - Swab, Nasopharynx [[id number]]  (Normal) Collected: 01/10/25 1434    Specimen: Swab from Nasopharynx Updated: 01/10/25 1553     ADENOVIRUS, PCR Not Detected     Coronavirus 229E Not Detected     Coronavirus HKU1 Not Detected     Coronavirus NL63 Not Detected     Coronavirus OC43 Not Detected  "    COVID19 Not Detected     Human Metapneumovirus Not Detected     Human Rhinovirus/Enterovirus Not Detected     Influenza A PCR Not Detected     Influenza B PCR Not Detected     Parainfluenza Virus 1 Not Detected     Parainfluenza Virus 2 Not Detected     Parainfluenza Virus 3 Not Detected     Parainfluenza Virus 4 Not Detected     RSV, PCR Not Detected     Bordetella pertussis pcr Not Detected     Bordetella parapertussis PCR Not Detected     Chlamydophila pneumoniae PCR Not Detected     Mycoplasma pneumo by PCR Not Detected    Narrative:      In the setting of a positive respiratory panel with a viral infection PLUS a negative procalcitonin without other underlying concern for bacterial infection, consider observing off antibiotics or discontinuation of antibiotics and continue supportive care. If the respiratory panel is positive for atypical bacterial infection (Bordetella pertussis, Chlamydophila pneumoniae, or Mycoplasma pneumoniae), consider antibiotic de-escalation to target atypical bacterial infection.    Procalcitonin [[id number]]  (Abnormal) Collected: 01/10/25 1508    Specimen: Blood Updated: 01/10/25 1546     Procalcitonin 0.26 ng/mL     Narrative:      As a Marker for Sepsis (Non-Neonates):    1. <0.5 ng/mL represents a low risk of severe sepsis and/or septic shock.  2. >2 ng/mL represents a high risk of severe sepsis and/or septic shock.    As a Marker for Lower Respiratory Tract Infections that require antibiotic therapy:    PCT on Admission    Antibiotic Therapy       6-12 Hrs later    >0.5                Strongly Recommended  >0.25 - <0.5        Recommended   0.1 - 0.25          Discouraged              Remeasure/reassess PCT  <0.1                Strongly Discouraged     Remeasure/reassess PCT    As 28 day mortality risk marker: \"Change in Procalcitonin Result\" (>80% or <=80%) if Day 0 (or Day 1) and Day 4 values are available. Refer to http://www.[employer]-pct-calculator.com    Change in PCT " <=80%  A decrease of PCT levels below or equal to 80% defines a positive change in PCT test result representing a higher risk for 28-day all-cause mortality of patients diagnosed with severe sepsis for septic shock.    Change in PCT >80%  A decrease of PCT levels of more than 80% defines a negative change in PCT result representing a lower risk for 28-day all-cause mortality of patients diagnosed with severe sepsis or septic shock.       Comprehensive Metabolic Panel [[id number]]  (Abnormal) Collected: 01/10/25 1508    Specimen: Blood Updated: 01/10/25 1543     Glucose 85 mg/dL      BUN 58 mg/dL      Creatinine 3.41 mg/dL      Sodium 139 mmol/L      Potassium 4.4 mmol/L      Comment: Slight hemolysis detected by analyzer. Result may be falsely elevated.        Chloride 104 mmol/L      CO2 17.0 mmol/L      Calcium 8.6 mg/dL      Total Protein 6.1 g/dL      Albumin 3.5 g/dL      ALT (SGPT) 12 U/L      AST (SGOT) 26 U/L      Comment: Slight hemolysis detected by analyzer. Result may be falsely elevated.        Alkaline Phosphatase 142 U/L      Total Bilirubin 0.4 mg/dL      Globulin 2.6 gm/dL      A/G Ratio 1.3 g/dL      BUN/Creatinine Ratio 17.0     Anion Gap 18.0 mmol/L      eGFR 17.9 mL/min/1.73     Narrative:      GFR Categories in Chronic Kidney Disease (CKD)      GFR Category          GFR (mL/min/1.73)    Interpretation  G1                     90 or greater         Normal or high (1)  G2                      60-89                Mild decrease (1)  G3a                   45-59                Mild to moderate decrease  G3b                   30-44                Moderate to severe decrease  G4                    15-29                Severe decrease  G5                    14 or less           Kidney failure          (1)In the absence of evidence of kidney disease, neither GFR category G1 or G2 fulfill the criteria for CKD.    eGFR calculation 2021 CKD-EPI creatinine equation, which does not include race as a factor     High Sensitivity Troponin T [[id number]]  (Abnormal) Collected: 01/10/25 1508    Specimen: Blood Updated: 01/10/25 1543     HS Troponin T 65 ng/L     Narrative:      High Sensitive Troponin T Reference Range:  <14.0 ng/L- Negative Female for AMI  <22.0 ng/L- Negative Male for AMI  >=14 - Abnormal Female indicating possible myocardial injury.  >=22 - Abnormal Male indicating possible myocardial injury.   Clinicians would have to utilize clinical acumen, EKG, Troponin, and serial changes to determine if it is an Acute Myocardial Infarction or myocardial injury due to an underlying chronic condition.         BNP [[id number]]  (Abnormal) Collected: 01/10/25 1508    Specimen: Blood Updated: 01/10/25 1540     proBNP 32,562.0 pg/mL     Narrative:      This assay is used as an aid in the diagnosis of individuals suspected of having heart failure. It can be used as an aid in the diagnosis of acute decompensated heart failure (ADHF) in patients presenting with signs and symptoms of ADHF to the emergency department (ED). In addition, NT-proBNP of <300 pg/mL indicates ADHF is not likely.    Age Range Result Interpretation  NT-proBNP Concentration (pg/mL:      <50             Positive            >450                   Gray                 300-450                    Negative             <300    50-75           Positive            >900                  Gray                300-900                  Negative            <300      >75             Positive            >1800                  Gray                300-1800                  Negative            <300    Magnesium [[id number]]  (Normal) Collected: 01/10/25 1508    Specimen: Blood Updated: 01/10/25 1540     Magnesium 2.2 mg/dL     Lactic Acid, Plasma [[id number]]  (Normal) Collected: 01/10/25 1508    Specimen: Blood Updated: 01/10/25 1540     Lactate 0.9 mmol/L     Protime-INR [[id number]]  (Abnormal) Collected: 01/10/25 1508    Specimen: Blood Updated: 01/10/25 1529     Protime 18.1  Seconds      INR 1.41    aPTT [[id number]]  (Normal) Collected: 01/10/25 1508    Specimen: Blood Updated: 01/10/25 1529     PTT 33.6 seconds     CBC & Differential [[id number]]  (Abnormal) Collected: 01/10/25 1508    Specimen: Blood Updated: 01/10/25 1525    Narrative:      The following orders were created for panel order CBC & Differential.  Procedure                               Abnormality         Status                     ---------                               -----------         ------                     CBC Auto Differential[[id number]]        Abnormal            Final result                 Please view results for these tests on the individual orders.    CBC Auto Differential [[id number]]  (Abnormal) Collected: 01/10/25 1508    Specimen: Blood Updated: 01/10/25 1525     WBC 5.17 10*3/mm3      RBC 2.77 10*6/mm3      Hemoglobin 9.0 g/dL      Hematocrit 28.6 %      .2 fL      MCH 32.5 pg      MCHC 31.5 g/dL      RDW 16.4 %      RDW-SD 62.6 fl      MPV 10.9 fL      Platelets 80 10*3/mm3      Neutrophil % 76.9 %      Lymphocyte % 9.7 %      Monocyte % 7.9 %      Eosinophil % 3.5 %      Basophil % 1.2 %      Immature Grans % 0.8 %      Neutrophils, Absolute 3.98 10*3/mm3      Lymphocytes, Absolute 0.50 10*3/mm3      Monocytes, Absolute 0.41 10*3/mm3      Eosinophils, Absolute 0.18 10*3/mm3      Basophils, Absolute 0.06 10*3/mm3      Immature Grans, Absolute 0.04 10*3/mm3      nRBC 0.0 /100 WBC     Blood Gas, Arterial With Co-Ox [[id number]]  (Abnormal) Collected: 01/10/25 1347    Specimen: Arterial Blood Updated: 01/10/25 1347     Site Right Brachial     Héctor's Test Positive     pH, Arterial 7.351 pH units      pCO2, Arterial 32.3 mm Hg      Comment: 84 Value below reference range        pO2, Arterial 64.5 mm Hg      Comment: 84 Value below reference range        HCO3, Arterial 17.8 mmol/L      Comment: 84 Value below reference range        Base Excess, Arterial -7.0 mmol/L      Comment: 84 Value below  reference range        O2 Saturation, Arterial 92.5 %      Comment: 84 Value below reference range        Hemoglobin, Blood Gas 9.1 g/dL      Comment: 84 Value below reference range        Hematocrit, Blood Gas 27.9 %      Comment: 84 Value below reference range        Oxyhemoglobin 89.5 %      Comment: 84 Value below reference range        Methemoglobin 0.40 %      Carboxyhemoglobin 2.8 %      Temperature 37.0     Sodium, Arterial 139 mmol/L      Potassium, Arterial 3.8 mmol/L      Barometric Pressure for Blood Gas 750 mmHg      Modality Room Air     Ventilator Mode NA     Collected by [id number]     Comment: Meter: [id number]     :  Janeth Montilla CRT        pH, Temp Corrected 7.351 pH Units      pCO2, Temperature Corrected 32.3 mm Hg      pO2, Temperature Corrected 64.5 mm Hg              Radiology:   Imaging Results (Last 72 Hours)       Procedure Component Value Units Date/Time    US Renal Bilateral [[id number]] Resulted: 01/12/25 1340     Updated: 01/12/25 1456    CT Chest Without Contrast Diagnostic [[id number]] Collected: 01/11/25 0737     Updated: 01/11/25 0749    Narrative:      EXAMINATION:  CT CHEST WO CONTRAST DIAGNOSTIC-  1/11/2025 4:09 AM     HISTORY: Evaluate pneumonia.     COMPARISON : Chest x-ray 1/10/2025.     DLP: 169.96 mGy.cm Automated dosage reduction technique was utilized to  reduce patient dosage.     TECHNIQUE: Spiral CT was performed of the chest without contrast.  Sagittal and coronal images were reconstructed.     MEDIASTINUM, HEART AND VASCULAR STRUCTURES: There is carotid artery  calcification in the neck. There is atheromatous disease of the thoracic  aorta and coronary arteries. There is cardiomegaly. The ascending aorta  is dilated measuring 4 cm. Mid aortic arch measures 3.2 cm. Descending  aorta measures 2.9 cm. Main pulmonary artery segment dilated measuring  3.7 cm. There are enlarged mediastinal lymph nodes. There is a 1.3 cm  mediastinal lymph node to the left of the  distal ascending aorta. There  is a 2.3 cm short axis diameter lymph node anterior to the distal  trachea. There is a 2.1 cm short axis diameter left paratracheal lymph  node. The hilar areas are not well evaluated due to the lack of contrast  administration. There are no enlarged axillary lymph nodes.     LUNGS: There is a large right pleural effusion. There is a small left  pleural effusion. There is near complete atelectasis of the right lower  lobe and partial collapse of the right middle lobe. There is patchy  consolidation in the left lower lobe. There is some degree of paraseptal  and centrilobular emphysema.     UPPER ABDOMEN: There is a small amount of ascites around the liver and  spleen. There is thickening of the gastric wall.     BONES: There is body wall edema. There are degenerative changes of the  spine and shoulders. There is avascular necrosis of the right humeral  head.          Impression:      1. Large right and small left pleural effusions.  2. Near complete collapse/atelectasis of the right lower lobe and  partial collapse/atelectasis of the right middle lobe. Patchy  consolidation in the left lower lobe is likely due to atelectasis.  Pneumonia cannot be ruled out.  3. Mediastinal lymphadenopathy. The lymph nodes could be reactive or  neoplastic. The largest lymph node is anterior to the distal trachea  with a short axis diameter of 2.3 cm.  4. Cardiomegaly. Atheromatous disease of the thoracic aorta and coronary  arteries. Prior heart surgery. Dilated ascending thoracic aorta  measuring 4 cm. The remainder of the thoracic aorta is normal caliber.  Dilated main pulmonary artery segment measuring 3.7 cm suggesting  pulmonary hypertension.  5. Small amount of ascites around the liver and spleen. Body wall edema.  6. Thickening of the gastric wall may be due to underdistention.  Gastritis and other etiologies considered.  7. Avascular necrosis right humeral head. Degenerative changes of  "the  spine and shoulders.           This report was signed and finalized on 1/11/2025 7:46 AM by Dr. Mikael Gallegos MD.       XR Chest 1 View [[id number]] Collected: 01/10/25 1356     Updated: 01/10/25 1400    Narrative:      EXAM: XR CHEST 1 VW- 1/10/2025 12:46 PM     HISTORY: Short of breath and wheezing cough       COMPARISON: 12/26/2021.     TECHNIQUE: Single frontal radiograph of the chest was obtained.     FINDINGS:     Support Devices: Prior CABG.     Cardiac and Mediastinal Silhouettes: Cardiomegaly.     Lungs/Pleura: Small to moderate right and trace left pleural effusions  with mild interstitial opacities. No visible pneumothorax.     Osseous structures: No acute osseous finding.     Other: None.       Impression:         Small to moderate right and trace left pleural effusions with mild  interstitial opacities, suggestive of pulmonary edema.           This report was signed and finalized on 1/10/2025 1:57 PM by Jake Briseno.               Culture:  No components found for: \"WOUNDCUL\", \"3\"  No components found for: \"CSFCUL\", \"3\"  No components found for: \"BC\", \"3\"  No components found for: \"URINECUL\", \"3\"      Assessment   -Chronic kidney disease stage IV  -Acute on chronic diastolic heart failure  -Dyspnea  -Hypertension  -History of chron's disease  -History of coronary artery disease  -Metabolic acidosis  -Anemia of CKD      Plan:  At this stage, his kidney functions appears around its baseline.  Patient is not uremic and no renal replacement therapy is needed.  Continue diuretics for symptom relief.  Will check iron studies, uric acid level and PTH.  Urine studies and follow-up on renal ultrasound result.  Avoid hypotension and nephrotoxins.  Will add hydralazine for BP control.    Thank you for the consult, we appreciate the opportunity to provide care to your patients.  Feel free to contact me if I can be of any further assistance.      Bolivar Rueda MD  1/12/2025  16:00 CST  "

## 2025-01-13 LAB
ALBUMIN SERPL-MCNC: 3.4 G/DL (ref 3.5–5.2)
ALBUMIN/GLOB SERPL: 1.5 G/DL
ALP SERPL-CCNC: 124 U/L (ref 39–117)
ALT SERPL W P-5'-P-CCNC: 9 U/L (ref 1–41)
ANION GAP SERPL CALCULATED.3IONS-SCNC: 12 MMOL/L (ref 5–15)
AST SERPL-CCNC: 16 U/L (ref 1–40)
BASOPHILS # BLD AUTO: 0.04 10*3/MM3 (ref 0–0.2)
BASOPHILS NFR BLD AUTO: 0.9 % (ref 0–1.5)
BILIRUB SERPL-MCNC: 0.3 MG/DL (ref 0–1.2)
BUN SERPL-MCNC: 74 MG/DL (ref 8–23)
BUN/CREAT SERPL: 20.8 (ref 7–25)
CALCIUM SPEC-SCNC: 8.4 MG/DL (ref 8.6–10.5)
CHLORIDE SERPL-SCNC: 106 MMOL/L (ref 98–107)
CO2 SERPL-SCNC: 22 MMOL/L (ref 22–29)
CREAT SERPL-MCNC: 3.55 MG/DL (ref 0.76–1.27)
DEPRECATED RDW RBC AUTO: 61.2 FL (ref 37–54)
EGFRCR SERPLBLD CKD-EPI 2021: 17.1 ML/MIN/1.73
EOSINOPHIL # BLD AUTO: 0.22 10*3/MM3 (ref 0–0.4)
EOSINOPHIL NFR BLD AUTO: 5 % (ref 0.3–6.2)
ERYTHROCYTE [DISTWIDTH] IN BLOOD BY AUTOMATED COUNT: 16 % (ref 12.3–15.4)
GLOBULIN UR ELPH-MCNC: 2.3 GM/DL
GLUCOSE SERPL-MCNC: 100 MG/DL (ref 65–99)
HCT VFR BLD AUTO: 28.7 % (ref 37.5–51)
HGB BLD-MCNC: 9 G/DL (ref 13–17.7)
IMM GRANULOCYTES # BLD AUTO: 0.02 10*3/MM3 (ref 0–0.05)
IMM GRANULOCYTES NFR BLD AUTO: 0.5 % (ref 0–0.5)
IRON 24H UR-MRATE: 78 MCG/DL (ref 59–158)
IRON SATN MFR SERPL: 34 % (ref 20–50)
LYMPHOCYTES # BLD AUTO: 0.66 10*3/MM3 (ref 0.7–3.1)
LYMPHOCYTES NFR BLD AUTO: 14.9 % (ref 19.6–45.3)
MCH RBC QN AUTO: 32.4 PG (ref 26.6–33)
MCHC RBC AUTO-ENTMCNC: 31.4 G/DL (ref 31.5–35.7)
MCV RBC AUTO: 103.2 FL (ref 79–97)
MONOCYTES # BLD AUTO: 0.55 10*3/MM3 (ref 0.1–0.9)
MONOCYTES NFR BLD AUTO: 12.4 % (ref 5–12)
NEUTROPHILS NFR BLD AUTO: 2.95 10*3/MM3 (ref 1.7–7)
NEUTROPHILS NFR BLD AUTO: 66.3 % (ref 42.7–76)
NRBC BLD AUTO-RTO: 0 /100 WBC (ref 0–0.2)
PLATELET # BLD AUTO: 78 10*3/MM3 (ref 140–450)
PMV BLD AUTO: 10 FL (ref 6–12)
POTASSIUM SERPL-SCNC: 4 MMOL/L (ref 3.5–5.2)
PROT SERPL-MCNC: 5.7 G/DL (ref 6–8.5)
PTH-INTACT SERPL-MCNC: 263.3 PG/ML (ref 15–65)
RBC # BLD AUTO: 2.78 10*6/MM3 (ref 4.14–5.8)
SODIUM SERPL-SCNC: 140 MMOL/L (ref 136–145)
TIBC SERPL-MCNC: 229 MCG/DL (ref 298–536)
TRANSFERRIN SERPL-MCNC: 154 MG/DL (ref 200–360)
TSH SERPL DL<=0.05 MIU/L-ACNC: 8.55 UIU/ML (ref 0.27–4.2)
URATE SERPL-MCNC: 10.4 MG/DL (ref 3.4–7)
WBC NRBC COR # BLD AUTO: 4.44 10*3/MM3 (ref 3.4–10.8)

## 2025-01-13 PROCEDURE — 99232 SBSQ HOSP IP/OBS MODERATE 35: CPT | Performed by: NURSE PRACTITIONER

## 2025-01-13 PROCEDURE — 84443 ASSAY THYROID STIM HORMONE: CPT | Performed by: EMERGENCY MEDICINE

## 2025-01-13 PROCEDURE — 83835 ASSAY OF METANEPHRINES: CPT | Performed by: EMERGENCY MEDICINE

## 2025-01-13 PROCEDURE — 25010000002 EPOETIN ALFA-EPBX 10000 UNIT/ML SOLUTION: Performed by: INTERNAL MEDICINE

## 2025-01-13 PROCEDURE — 83970 ASSAY OF PARATHORMONE: CPT | Performed by: INTERNAL MEDICINE

## 2025-01-13 PROCEDURE — 85025 COMPLETE CBC W/AUTO DIFF WBC: CPT | Performed by: HOSPITALIST

## 2025-01-13 PROCEDURE — 25010000002 FUROSEMIDE PER 20 MG: Performed by: NURSE PRACTITIONER

## 2025-01-13 PROCEDURE — 84466 ASSAY OF TRANSFERRIN: CPT | Performed by: INTERNAL MEDICINE

## 2025-01-13 PROCEDURE — 80053 COMPREHEN METABOLIC PANEL: CPT | Performed by: HOSPITALIST

## 2025-01-13 PROCEDURE — 84550 ASSAY OF BLOOD/URIC ACID: CPT | Performed by: INTERNAL MEDICINE

## 2025-01-13 PROCEDURE — 25010000002 HYDRALAZINE PER 20 MG: Performed by: HOSPITALIST

## 2025-01-13 PROCEDURE — 84244 ASSAY OF RENIN: CPT | Performed by: INTERNAL MEDICINE

## 2025-01-13 PROCEDURE — 82088 ASSAY OF ALDOSTERONE: CPT | Performed by: EMERGENCY MEDICINE

## 2025-01-13 PROCEDURE — 84244 ASSAY OF RENIN: CPT | Performed by: EMERGENCY MEDICINE

## 2025-01-13 PROCEDURE — 83540 ASSAY OF IRON: CPT | Performed by: INTERNAL MEDICINE

## 2025-01-13 RX ORDER — SPIRONOLACTONE 25 MG/1
25 TABLET ORAL DAILY
Status: DISCONTINUED | OUTPATIENT
Start: 2025-01-13 | End: 2025-01-14

## 2025-01-13 RX ORDER — CALCITRIOL 0.25 UG/1
0.5 CAPSULE, LIQUID FILLED ORAL DAILY
Status: DISCONTINUED | OUTPATIENT
Start: 2025-01-13 | End: 2025-01-16 | Stop reason: HOSPADM

## 2025-01-13 RX ADMIN — FUROSEMIDE 40 MG: 10 INJECTION, SOLUTION INTRAVENOUS at 17:04

## 2025-01-13 RX ADMIN — EPOETIN ALFA-EPBX 10000 UNITS: 10000 INJECTION, SOLUTION INTRAVENOUS; SUBCUTANEOUS at 16:43

## 2025-01-13 RX ADMIN — MESALAMINE 1.5 G: 375 CAPSULE, EXTENDED RELEASE ORAL at 08:18

## 2025-01-13 RX ADMIN — MONTELUKAST SODIUM 10 MG: 10 TABLET, COATED ORAL at 21:31

## 2025-01-13 RX ADMIN — CLONIDINE HYDROCHLORIDE 0.3 MG: 0.1 TABLET ORAL at 16:43

## 2025-01-13 RX ADMIN — HYDRALAZINE HYDROCHLORIDE 100 MG: 50 TABLET ORAL at 16:43

## 2025-01-13 RX ADMIN — SPIRONOLACTONE 25 MG: 25 TABLET ORAL at 11:29

## 2025-01-13 RX ADMIN — VALSARTAN 320 MG: 80 TABLET, FILM COATED ORAL at 08:18

## 2025-01-13 RX ADMIN — CHOLESTYRAMINE 1 PACKET: 4 POWDER, FOR SUSPENSION ORAL at 08:18

## 2025-01-13 RX ADMIN — LEVOTHYROXINE SODIUM 75 MCG: 75 TABLET ORAL at 06:20

## 2025-01-13 RX ADMIN — ASPIRIN 81 MG: 81 TABLET, COATED ORAL at 08:18

## 2025-01-13 RX ADMIN — FUROSEMIDE 40 MG: 10 INJECTION, SOLUTION INTRAVENOUS at 08:18

## 2025-01-13 RX ADMIN — CLONIDINE HYDROCHLORIDE 0.3 MG: 0.1 TABLET ORAL at 21:31

## 2025-01-13 RX ADMIN — SODIUM BICARBONATE 1300 MG: 650 TABLET ORAL at 21:31

## 2025-01-13 RX ADMIN — CLONIDINE HYDROCHLORIDE 0.3 MG: 0.1 TABLET ORAL at 08:19

## 2025-01-13 RX ADMIN — HYDRALAZINE HYDROCHLORIDE 100 MG: 50 TABLET ORAL at 21:31

## 2025-01-13 RX ADMIN — SODIUM BICARBONATE 650 MG: 650 TABLET ORAL at 11:28

## 2025-01-13 RX ADMIN — HYDRALAZINE HYDROCHLORIDE 10 MG: 20 INJECTION INTRAMUSCULAR; INTRAVENOUS at 11:29

## 2025-01-13 RX ADMIN — Medication 10 MG: at 21:31

## 2025-01-13 RX ADMIN — EMPAGLIFLOZIN 10 MG: 10 TABLET, FILM COATED ORAL at 08:19

## 2025-01-13 RX ADMIN — HYDRALAZINE HYDROCHLORIDE 100 MG: 50 TABLET ORAL at 08:19

## 2025-01-13 RX ADMIN — Medication 10 ML: at 08:20

## 2025-01-13 RX ADMIN — PANTOPRAZOLE SODIUM 40 MG: 40 TABLET, DELAYED RELEASE ORAL at 06:20

## 2025-01-13 RX ADMIN — Medication 10 ML: at 21:32

## 2025-01-13 RX ADMIN — DOCUSATE SODIUM 100 MG: 100 CAPSULE, LIQUID FILLED ORAL at 16:43

## 2025-01-13 RX ADMIN — CETIRIZINE HYDROCHLORIDE TABLETS 10 MG: 10 TABLET, FILM COATED ORAL at 08:19

## 2025-01-13 RX ADMIN — DOCUSATE SODIUM 100 MG: 100 CAPSULE, LIQUID FILLED ORAL at 08:18

## 2025-01-13 RX ADMIN — SODIUM BICARBONATE 1300 MG: 650 TABLET ORAL at 08:19

## 2025-01-13 RX ADMIN — TAMSULOSIN HYDROCHLORIDE 0.4 MG: 0.4 CAPSULE ORAL at 21:31

## 2025-01-13 RX ADMIN — NIFEDIPINE 120 MG: 60 TABLET, FILM COATED, EXTENDED RELEASE ORAL at 08:18

## 2025-01-13 RX ADMIN — DOCUSATE SODIUM 100 MG: 100 CAPSULE, LIQUID FILLED ORAL at 21:31

## 2025-01-13 RX ADMIN — ATORVASTATIN CALCIUM 10 MG: 10 TABLET, FILM COATED ORAL at 08:19

## 2025-01-13 RX ADMIN — CLOPIDOGREL BISULFATE 75 MG: 75 TABLET ORAL at 08:19

## 2025-01-13 RX ADMIN — CALCITRIOL CAPSULES 0.25 MCG 0.5 MCG: 0.25 CAPSULE ORAL at 16:43

## 2025-01-13 NOTE — PROGRESS NOTES
Nephrology ([facility]) Progress Note      Patient:  Ricardo Hugo  YOB: 1950  Date of Service: 1/13/2025  MRN: [id number]   Acct: [id number]   Primary Care Physician: Nathan Zimmer MD  Advance Directive:   Code Status and Medical Interventions: CPR (Attempt to Resuscitate); Full Support   Ordered at: 01/10/25 1631     Code Status (Patient has no pulse and is not breathing):    CPR (Attempt to Resuscitate)     Medical Interventions (Patient has pulse or is breathing):    Full Support     Admit Date: 1/10/2025       Hospital Day: 3  Referring Provider: Vidal Miramontes MD      Patient personally seen and examined.  Complete chart including Consults, Notes, Operative Reports, Labs, Cardiology, and Radiology studies reviewed as able.        Subjective:  Ricardo Hugo is a 74 y.o. male for whom we were consulted for evaluation and treatment of chronic kidney disease stage 4. Baseline GFR 17, follows in our office. History of difficult to control hypertension, Crohn's disease, diastolic CHF. Presented to ER with increased dyspnea. Admitted for CHF exacerbation. Initial labs showed creatinine 3.4 which is essentially his baseline level. Denied edema or weight gain. Denied changes in urination. No n/v/d.    Today is feeling better. Adequate response to Lasix. Urine output nonoliguric. BP has remained elevated.     Allergies:  Ondansetron, Zofran [ondansetron hcl], Lortab [hydrocodone-acetaminophen], and Allopurinol    Home Meds:  Facility-Administered Medications Prior to Admission   Medication Dose Route Frequency Provider Last Rate Last Admin    cyanocobalamin injection 1,000 mcg  1,000 mcg Intramuscular Q28 Days Ruthie Parra, APRN   1,000 mcg at 08/11/23 1123     Medications Prior to Admission   Medication Sig Dispense Refill Last Dose/Taking    albuterol sulfate  (90 Base) MCG/ACT inhaler Inhale 2 puffs 4 (Four) Times a Day As Needed for Wheezing or  Shortness of Air.   Past Week    ALPRAZolam (XANAX) 0.25 MG tablet Take 1 tablet by mouth Every Night.   Past Week    aspirin (aspirin) 81 MG EC tablet Take 1 tablet by mouth Daily.   Past Week    atorvastatin (LIPITOR) 10 MG tablet Take 1 tablet by mouth Daily. 90 tablet 3 Past Week    Budeson-Glycopyrrol-Formoterol (Breztri Aerosphere) 160-9-4.8 MCG/ACT aerosol inhaler Inhale 2 puffs 2 (Two) Times a Day. 1 each 3 Past Week    carvedilol (COREG) 25 MG tablet Take 1 tablet by mouth 2 (Two) Times a Day With Meals. 180 tablet 3 Past Week    cefdinir (OMNICEF) 300 MG capsule Take 1 capsule by mouth Daily. 5 capsule 0 Past Week    cholestyramine light 4 g packet Take 1 packet by mouth Daily.   Past Week    cloNIDine (CATAPRES) 0.3 MG tablet Take 1 tablet by mouth 3 (Three) Times a Day. 270 tablet 2 Past Week    cloNIDine (CATAPRES-TTS) 0.2 MG/24HR patch Place 1 patch on the skin as directed by provider 1 (One) Time Per Week. THURSDAYS   Past Week    clopidogrel (PLAVIX) 75 MG tablet Take 1 tablet by mouth Daily.   Past Week    Copper Gluconate (Copper Caps) 2 MG capsule Take 2 mg by mouth Daily.   Past Week    docusate sodium (COLACE) 100 MG capsule Take 1 capsule by mouth 3 (Three) Times a Day As Needed for Constipation.   Past Week    fexofenadine (ALLEGRA) 180 MG tablet Take 1 tablet by mouth Daily.   Past Week    fluticasone (FLONASE) 50 MCG/ACT nasal spray Administer 2 sprays into the nostril(s) as directed by provider Daily.   Past Week    furosemide (Lasix) 20 MG tablet Take 1 tablet by mouth 3 (Three) Times a Day. 270 tablet 3 Past Week    hydrALAZINE (APRESOLINE) 100 MG tablet Take 1 tablet by mouth 3 (Three) Times a Day. 100mg daily   Past Week    levothyroxine (Synthroid) 75 MCG tablet Take 1 tablet by mouth Daily. 90 tablet 3 Past Week    Magnesium Cl-Calcium Carbonate (SLOW-MAG PO) Take 143 mg by mouth Daily.   Past Week    Melatonin 10 MG capsule Take 3 capsules by mouth Every Night.   Past Week     mesalamine (APRISO) 0.375 g 24 hr capsule Take 4 capsules by mouth Daily.   Past Week    montelukast (SINGULAIR) 10 MG tablet Take 1 tablet by mouth Every Night.   Past Week    Multiple Vitamins-Minerals (PRESERVISION/LUTEIN) capsule Take 1 capsule by mouth 2 (two) times a day.   Past Week    NIFEdipine XL (PROCARDIA XL) 90 MG 24 hr tablet Take 1 tablet by mouth Daily.   Past Week    omeprazole (priLOSEC) 20 MG capsule Take 1 capsule by mouth Daily.   Past Week    sodium bicarbonate 650 MG tablet Take 5 tablets by mouth Daily. 2 qam, 1 at noon, and 2 qpm   Past Week    spironolactone (ALDACTONE) 25 MG tablet Take 1 tablet by mouth Daily. 90 tablet 2 Past Week    tamsulosin (FLOMAX) 0.4 MG capsule 24 hr capsule Take 1 capsule by mouth Every Night. 90 capsule 3 Past Week    valsartan (DIOVAN) 320 MG tablet Take 1 tablet by mouth Daily.   Past Week    vitamin D (ERGOCALCIFEROL) 1.25 MG (68049 UT) capsule capsule Take 1 capsule by mouth 1 (One) Time Per Week.   Past Week       Medicines:  Current Facility-Administered Medications   Medication Dose Route Frequency Provider Last Rate Last Admin    acetaminophen (TYLENOL) tablet 650 mg  650 mg Oral Q4H PRN Katherine Camarena APRN        Or    acetaminophen (TYLENOL) 160 MG/5ML oral solution 650 mg  650 mg Oral Q4H PRN Katherine Camarena APRN        Or    acetaminophen (TYLENOL) suppository 650 mg  650 mg Rectal Q4H PRN Katherine Camarena APRN        aspirin EC tablet 81 mg  81 mg Oral Daily Katherine Camarena APRN   81 mg at 01/13/25 0818    atorvastatin (LIPITOR) tablet 10 mg  10 mg Oral Daily Katherine Camarnea APRN   10 mg at 01/13/25 0819    sennosides-docusate (PERICOLACE) 8.6-50 MG per tablet 2 tablet  2 tablet Oral BID PRN Katherine Camarena APRN        And    polyethylene glycol (MIRALAX) packet 17 g  17 g Oral Daily PRN Katherine Camarena APRSHERITA        And    bisacodyl (DULCOLAX) EC tablet 5 mg  5 mg Oral Daily PRN Katherine Camarena APRN        And    bisacodyl  (DULCOLAX) suppository 10 mg  10 mg Rectal Daily PRN Katherine Camarena, APRN        cetirizine (zyrTEC) tablet 10 mg  10 mg Oral Daily Katherine Camarena, APRN   10 mg at 01/13/25 0819    cholestyramine (QUESTRAN) packet 1 packet  1 packet Oral Daily Katherine Camarena, APRN   1 packet at 01/13/25 0818    cloNIDine (CATAPRES) tablet 0.3 mg  0.3 mg Oral TID Vidal Miramontes MD   0.3 mg at 01/13/25 0819    clopidogrel (PLAVIX) tablet 75 mg  75 mg Oral Daily Katherine Camarena, APRN   75 mg at 01/13/25 0819    docusate sodium (COLACE) capsule 100 mg  100 mg Oral TID Katherine Camarena, APRN   100 mg at 01/13/25 0818    empagliflozin (JARDIANCE) tablet 10 mg  10 mg Oral Daily Morales Schrader MD   10 mg at 01/13/25 0819    fluticasone (FLONASE) 50 MCG/ACT nasal spray 2 spray  2 spray Each Nare Daily Katherine Camarena, APRN   2 spray at 01/12/25 0818    furosemide (LASIX) injection 40 mg  40 mg Intravenous BID Diuretics Katherine Camarena, APRN   40 mg at 01/13/25 0818    hydrALAZINE (APRESOLINE) injection 10 mg  10 mg Intravenous Q4H PRN Vidal Miramontes MD   10 mg at 01/11/25 0544    hydrALAZINE (APRESOLINE) tablet 100 mg  100 mg Oral TID Katherine Camarena APRN   100 mg at 01/13/25 0819    levothyroxine (SYNTHROID, LEVOTHROID) tablet 75 mcg  75 mcg Oral Q AM Katherine Camarena, APRN   75 mcg at 01/13/25 0620    melatonin tablet 10 mg  10 mg Oral Nightly Katherine Camarena, APRN   10 mg at 01/12/25 2110    mesalamine (APRISO) 24 hr capsule 1.5 g  1.5 g Oral Daily Katherine Camarena, APRN   1.5 g at 01/13/25 0818    montelukast (SINGULAIR) tablet 10 mg  10 mg Oral Nightly Katherine Camarena, APRN   10 mg at 01/12/25 2110    NIFEdipine XL (PROCARDIA XL) 24 hr tablet 120 mg  120 mg Oral Daily Morales Schrader MD   120 mg at 01/13/25 0818    nitroglycerin (NITROSTAT) SL tablet 0.4 mg  0.4 mg Sublingual Q5 Min PRN Katherine Camarena APRN        pantoprazole (PROTONIX) EC tablet 40 mg  40 mg Oral Q AM Katherine Camarena APRN   40 mg at  01/13/25 0620    promethazine (PHENERGAN) tablet 12.5 mg  12.5 mg Oral Q6H PRN Katherine Camarena, STERLING        sodium bicarbonate tablet 1,300 mg  1,300 mg Oral Q12H Katherine Camarena, APRN   1,300 mg at 01/13/25 0819    sodium bicarbonate tablet 650 mg  650 mg Oral Daily With Lunch Katherine Camarena, APRN   650 mg at 01/12/25 1226    sodium chloride 0.9 % flush 10 mL  10 mL Intravenous Q12H Katherine Camarena, APRN   10 mL at 01/13/25 0820    sodium chloride 0.9 % flush 10 mL  10 mL Intravenous PRN Katherine Camarena, APRN        sodium chloride 0.9 % infusion 40 mL  40 mL Intravenous PRN Katherine Camarena APRN        tamsulosin (FLOMAX) 24 hr capsule 0.4 mg  0.4 mg Oral Nightly Katherine Camarena, APRN   0.4 mg at 01/12/25 2110    valsartan (DIOVAN) tablet 320 mg  320 mg Oral Daily Katherine Camarena, APRN   320 mg at 01/13/25 0818       Past Medical History:  Past Medical History:   Diagnosis Date    3-vessel CAD 8/11/2020    Allergic rhinitis     Anxiety disorder 4/27/2020    Arthritis     Asymmetrical sensorineural hearing loss 6/28/2017    Atherosclerosis of native artery of both lower extremities with intermittent claudication 7/18/2019    Avascular necrosis of femoral head, left 07/11/2020    right hip after surgery    Carotid stenosis     Chronic mucoid otitis media     Chronic rhinitis     Coronary artery disease     HEART BYPASS 2004    Crohn's disease of large intestine with other complication 7/30/2020    Chronic diarrhea Colonoscopy July 2020 revealed mild patchy scattered hemosiderin staining with inflammation more so in rectosigmoid area.  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    Iron deficiency anemia 7/14/2020    Mixed hearing loss of left ear     PAD (peripheral artery disease) 8/11/2020    Perianal abscess     Pernicious anemia 08/17/2020    took shots but never diagnosed with b12 deficiency    Personal history of alcoholism 08/11/2020    quit drinking in 2013    Prostatic hypertrophy 8/11/2020    Sensorineural hearing loss     Sepsis with acute renal failure 9/15/2020    Shortness of breath 5/27/2021    Tinnitus     Ventricular tachycardia, nonsustained 7/14/2020    Weight loss 7/11/2020       Past Surgical History:  Past Surgical History:   Procedure Laterality Date    ARTERY SURGERY  2021    right side on neck    CAROTID ENDARTERECTOMY Right 5/10/2021    Procedure: RIGHT CAROTID ENDARTERECTOMY WITH EEG;  Surgeon: Gil Pineda DO;  Location: [facility] OR ;  Service: Vascular;  Laterality: Right;    COLONOSCOPY N/A 7/2/2020    Procedure: COLONOSCOPY WITH ANESTHESIA;  Surgeon: Adrien Brewster MD;  Location: [facility] ENDOSCOPY;  Service: Gastroenterology;  Laterality: N/A;  pre op: diarrhea  post op: polyps  PCP: Joe Velasco MD    COLONOSCOPY N/A 10/13/2020    Procedure: COLONOSCOPY WITH ANESTHESIA;  Surgeon: Adrien Brewster MD;  Location: [facility] ENDOSCOPY;  Service: Gastroenterology;  Laterality: N/A;  Pre: Chronic Diarrhea, Crohn's  Post: AVM  Dr. Neftali Velasco  CO2 Inflation Used    COLONOSCOPY N/A 12/8/2023    Procedure: COLONOSCOPY WITH ANESTHESIA;  Surgeon: Adrien Brewster MD;  Location: [facility] ENDOSCOPY;  Service: Gastroenterology;  Laterality: N/A;  pre chrone's disease  post sub optimal prep, polyp, chrone's      CORONARY ARTERY BYPASS GRAFT  2003    x3    ENDOSCOPY N/A 11/2/2021    Procedure: ESOPHAGOGASTRODUODENOSCOPY WITH ANESTHESIA;  Surgeon: Bridger Bell MD;  Location: [facility] ENDOSCOPY;  Service: Gastroenterology;  Laterality: N/A;  pre anemia;gi bleed  post  gi bleed;schatski ring  Dr. ERIC Velasco    ENDOSCOPY N/A 10/10/2023     Procedure: ESOPHAGOGASTRODUODENOSCOPY WITH ANESTHESIA;  Surgeon: Adrien Brewster MD;  Location: [facility] ENDOSCOPY;  Service: Gastroenterology;  Laterality: N/A;  preop; anemia  postop esophagitis ; R/O barretts   PCP Randall Beata    EYE SURGERY Bilateral     catorac    INCISION AND DRAINAGE PERIRECTAL ABSCESS N/A 3/3/2017    Procedure: INCISION AND DRAINAGE OF JEET ANAL ABSCESS;  Surgeon: Lynette Smith MD;  Location:  [facility] OR;  Service:     INGUINAL HERNIA REPAIR Bilateral 2023    Procedure: INGUINAL HERNIA BILATERAL REPAIR LAPAROSCOPIC WITH DAVINCI ROBOT WITH MESH;  Surgeon: Tahira Rivera MD;  Location: [facility] OR;  Service: Robotics - DaVinci;  Laterality: Bilateral;    MYRINGOTOMY W/ TUBES Left 2017    06/10/2016    TONSILLECTOMY      TOTAL HIP ARTHROPLASTY Right        Family History  Family History   Problem Relation Age of Onset    Breast cancer Mother     Dementia Father     Glaucoma Father     No Known Problems Daughter     Colon polyps Neg Hx     Colon cancer Neg Hx        Social History  Social History     Socioeconomic History    Marital status:    Tobacco Use    Smoking status: Former     Current packs/day: 0.00     Average packs/day: 0.5 packs/day for 25.8 years (12.9 ttl pk-yrs)     Types: Cigarettes     Start date:      Quit date: 10/13/2013     Years since quittin.2     Passive exposure: Past    Smokeless tobacco: Never    Tobacco comments:     quit 2013   Vaping Use    Vaping status: Never Used   Substance and Sexual Activity    Alcohol use: Not Currently    Drug use: No    Sexual activity: Not Currently     Partners: Female       Review of Systems:  History obtained from chart review and the patient  General ROS: No fever or chills  Respiratory ROS: No cough, shortness of breath, wheezing  Cardiovascular ROS: No chest pain or palpitations  Gastrointestinal ROS: No abdominal pain or melena  Genito-Urinary ROS: No dysuria or hematuria  Psych ROS: No  anxiety and depression  14 point ROS reviewed with the patient and negative except as noted above and in the HPI unless unable to obtain.    Objective:  Patient Vitals for the past 24 hrs:   BP Temp Temp src Pulse Resp SpO2 Weight   01/13/25 1041 180/50 98.1 °F (36.7 °C) Oral 54 16 98 % --   01/13/25 0732 (!) 203/57 97.7 °F (36.5 °C) Oral 51 16 100 % --   01/13/25 0500 (!) 187/53 98.2 °F (36.8 °C) Oral 50 18 100 % 66.6 kg (146 lb 12.8 oz)   01/13/25 0110 167/43 97.8 °F (36.6 °C) Oral (!) 49 18 100 % --   01/12/25 2127 (!) 184/52 97.9 °F (36.6 °C) Oral 53 18 98 % --   01/12/25 1700 160/74 99.2 °F (37.3 °C) Oral 53 18 99 % --   01/12/25 1100 173/54 98.5 °F (36.9 °C) Oral 54 16 94 % --       Intake/Output Summary (Last 24 hours) at 1/13/2025 1056  Last data filed at 1/13/2025 1041  Gross per 24 hour   Intake 840 ml   Output 3225 ml   Net -2385 ml     General: awake/alert   Chest:  clear to auscultation bilaterally without respiratory distress  CVS: regular rate and rhythm  Abdominal: soft, nontender, positive bowel sounds  Extremities: no cyanosis or edema  Skin: warm and dry without rash      Labs:  Results from last 7 days   Lab Units 01/13/25 0439 01/12/25  0401 01/11/25  0522   WBC 10*3/mm3 4.44 5.58 4.38   HEMOGLOBIN g/dL 9.0* 8.8* 9.7*   HEMATOCRIT % 28.7* 28.6* 30.8*   PLATELETS 10*3/mm3 78* 94* 102*         Results from last 7 days   Lab Units 01/13/25  0439 01/12/25  0401 01/11/25  0522 01/10/25  1508   SODIUM mmol/L 140 139 139 139   POTASSIUM mmol/L 4.0 4.1 4.3 4.4   CHLORIDE mmol/L 106 108* 107 104   CO2 mmol/L 22.0 19.0* 17.0* 17.0*   BUN mg/dL 74* 76* 63* 58*   CREATININE mg/dL 3.55* 3.72* 3.52* 3.41*   CALCIUM mg/dL 8.4* 8.2* 8.5* 8.6   EGFR mL/min/1.73 17.1* 16.1* 17.2* 17.9*   BILIRUBIN mg/dL 0.3 0.3  --  0.4   ALK PHOS U/L 124* 133*  --  142*   ALT (SGPT) U/L 9 8  --  12   AST (SGOT) U/L 16 17  --  26   GLUCOSE mg/dL 100* 104* 115* 85       Radiology:   Imaging Results (Last 72 Hours)       Procedure  Component Value Units Date/Time    US Renal Bilateral [[id number]] Collected: 01/12/25 1641     Updated: 01/12/25 1648    Narrative:      US RENAL BILATERAL- 1/12/2025 12:40 PM     HISTORY: CKD; I50.31-Acute diastolic (congestive) heart failure;  N18.4-Chronic kidney disease, stage 4 (severe); I10-Essential (primary)  hypertension; R00.1-Bradycardia, unspecified; R05.8-Other specified  cough; R79.89-Other specified abnormal findings of blood chemistry     COMPARISON: 7/12/2021     TECHNIQUE: Multiple longitudinal and transverse realtime sonographic  images of the kidneys and urinary bladder are obtained. Images stored in  PACS [facility] institutional protocol.     FINDINGS:      Trace perihepatic ascites. Cirrhotic liver morphology. Bilateral pleural  effusions.     Right kidney measures 9.5 x 4.1 x 4.6 cm. 1.7 cm cyst without complex  features. Increased renal cortical echogenicity. No hydronephrosis.     Left kidney: 9.6 x 5.3 x 5.1 cm. Simple appearing left renal cyst  without complex features measuring up to 1.1 cm.     Bladder is trabeculated. Prostate is borderline enlarged measuring 3.6 x  3.0 x 4.9 cm.       Impression:      1. Increased bilateral renal cortical echogenicity which can be seen  with medical renal disease. No hydronephrosis.  2. Cirrhotic liver morphology with trace ascites and bilateral pleural  effusions.  3. Trabeculated bladder which may be related to chronic bladder outlet  obstruction.           This report was signed and finalized on 1/12/2025 4:45 PM by Jake Briseno.       CT Chest Without Contrast Diagnostic [[id number]] Collected: 01/11/25 0737     Updated: 01/11/25 0749    Narrative:      EXAMINATION:  CT CHEST WO CONTRAST DIAGNOSTIC-  1/11/2025 4:09 AM     HISTORY: Evaluate pneumonia.     COMPARISON : Chest x-ray 1/10/2025.     DLP: 169.96 mGy.cm Automated dosage reduction technique was utilized to  reduce patient dosage.     TECHNIQUE: Spiral CT was performed of the chest without  contrast.  Sagittal and coronal images were reconstructed.     MEDIASTINUM, HEART AND VASCULAR STRUCTURES: There is carotid artery  calcification in the neck. There is atheromatous disease of the thoracic  aorta and coronary arteries. There is cardiomegaly. The ascending aorta  is dilated measuring 4 cm. Mid aortic arch measures 3.2 cm. Descending  aorta measures 2.9 cm. Main pulmonary artery segment dilated measuring  3.7 cm. There are enlarged mediastinal lymph nodes. There is a 1.3 cm  mediastinal lymph node to the left of the distal ascending aorta. There  is a 2.3 cm short axis diameter lymph node anterior to the distal  trachea. There is a 2.1 cm short axis diameter left paratracheal lymph  node. The hilar areas are not well evaluated due to the lack of contrast  administration. There are no enlarged axillary lymph nodes.     LUNGS: There is a large right pleural effusion. There is a small left  pleural effusion. There is near complete atelectasis of the right lower  lobe and partial collapse of the right middle lobe. There is patchy  consolidation in the left lower lobe. There is some degree of paraseptal  and centrilobular emphysema.     UPPER ABDOMEN: There is a small amount of ascites around the liver and  spleen. There is thickening of the gastric wall.     BONES: There is body wall edema. There are degenerative changes of the  spine and shoulders. There is avascular necrosis of the right humeral  head.          Impression:      1. Large right and small left pleural effusions.  2. Near complete collapse/atelectasis of the right lower lobe and  partial collapse/atelectasis of the right middle lobe. Patchy  consolidation in the left lower lobe is likely due to atelectasis.  Pneumonia cannot be ruled out.  3. Mediastinal lymphadenopathy. The lymph nodes could be reactive or  neoplastic. The largest lymph node is anterior to the distal trachea  with a short axis diameter of 2.3 cm.  4. Cardiomegaly.  Atheromatous disease of the thoracic aorta and coronary  arteries. Prior heart surgery. Dilated ascending thoracic aorta  measuring 4 cm. The remainder of the thoracic aorta is normal caliber.  Dilated main pulmonary artery segment measuring 3.7 cm suggesting  pulmonary hypertension.  5. Small amount of ascites around the liver and spleen. Body wall edema.  6. Thickening of the gastric wall may be due to underdistention.  Gastritis and other etiologies considered.  7. Avascular necrosis right humeral head. Degenerative changes of the  spine and shoulders.           This report was signed and finalized on 1/11/2025 7:46 AM by Dr. Mikael Gallegos MD.       XR Chest 1 View [[id number]] Collected: 01/10/25 1356     Updated: 01/10/25 1400    Narrative:      EXAM: XR CHEST 1 VW- 1/10/2025 12:46 PM     HISTORY: Short of breath and wheezing cough       COMPARISON: 12/26/2021.     TECHNIQUE: Single frontal radiograph of the chest was obtained.     FINDINGS:     Support Devices: Prior CABG.     Cardiac and Mediastinal Silhouettes: Cardiomegaly.     Lungs/Pleura: Small to moderate right and trace left pleural effusions  with mild interstitial opacities. No visible pneumothorax.     Osseous structures: No acute osseous finding.     Other: None.       Impression:         Small to moderate right and trace left pleural effusions with mild  interstitial opacities, suggestive of pulmonary edema.           This report was signed and finalized on 1/10/2025 1:57 PM by Jake Briseno.               Culture:  Blood Culture   Date Value Ref Range Status   01/10/2025 No growth at 2 days  Preliminary   01/10/2025 No growth at 2 days  Preliminary         Assessment    Chronic kidney disease stage 4  Acute on chronic diastolic CHF  Hypertension  Crohn's disease  Metabolic acidosis  Anemia of CKD    Plan:   Renal function at baseline level  Continue to titrate HTN medications  Will need to monitor renal function very closely with the addition  of Jardiance       Slava Tate, APRN  1/13/2025  10:56 CST

## 2025-01-13 NOTE — PROGRESS NOTES
[facility] Medicine Services  INPATIENT PROGRESS NOTE    Patient Name: Ricardo Hugo  Date of Admission: 1/10/2025  Today's Date: 01/13/25  Length of Stay: 3  Primary Care Physician: Nathan Zimmer MD    Subjective   Chief Complaint: Follow-up  HPI   Patient admitted on January 10  This is my first encounter with patient  Patient since admission has been seen by nephrology and cardiology  Cardiology service recommends workup for secondary causes of hypertension.  Nephrology on the other hand indicates renal function at baseline level and recommends continue titration of antihypertensive medication.    Patient presented with worsening shortness of breath and carries history of diastolic heart failure, coronary artery disease with prior coronary artery bypass graft, Crohn's disease, chronic kidney disease stage IV, hypertension, anemia of chronic disease.  Patient was described to be hypoxic in the mid 80s  He had recent antibiotic treatment for pneumonia (Augmentin with azithromycin then Omnicef)    Patient had blood pressure reading as high as 203/57 earlier.  Current antihypertensive medications include:     clonidine 0.3 mg 3 times daily  Hydralazine 100 mg 3 times daily  Lasix 40 mg IV twice daily  Nifedipine  mg daily  Valsartan 320 mg daily  Spironolactone 25 mg daily.      Patient has no new complaints  No headaches  No chest pain  No shortness of breath or difficulty breathing  Patient states that it is normal for him to have 200 blood pressure systolic.  He asked whether he is still on clonidine and I confirmed this based on above list of medications.  He said it will drop his blood pressure about 50 points.  He does not have any dizziness or lightheadedness even with this 50 points drop.        Review of Systems   All pertinent negatives and positives are as above. All other systems have been reviewed and are negative unless otherwise stated.     Objective     Temp:  [97.7 °F (36.5 °C)-99.2 °F (37.3 °C)] 98.1 °F (36.7 °C)  Heart Rate:  [49-54] 54  Resp:  [16-18] 16  BP: (160-203)/(43-74) 180/50  Physical Exam  Pleasant man  No distress  He is on O2 supplement with saturation of 98%  No distress  No thyromegaly  No gross obvious signs of fluid retention  Diminished breath sound with adequate air exchange and no obvious adventitious sound  Positive murmur  Regular rate and rhythm from what I heard it.  Soft flat abdomen  Warm dry skin with good capillary refill  Appropriate affect  No gross focal neurologic deficit.        Results Review:  I have reviewed the labs, radiology results, and diagnostic studies.    Laboratory Data:   Results from last 7 days   Lab Units 01/13/25 0439 01/12/25  0401 01/11/25  0522   WBC 10*3/mm3 4.44 5.58 4.38   HEMOGLOBIN g/dL 9.0* 8.8* 9.7*   HEMATOCRIT % 28.7* 28.6* 30.8*   PLATELETS 10*3/mm3 78* 94* 102*        Results from last 7 days   Lab Units 01/13/25  0439 01/12/25  0401 01/11/25  0522 01/10/25  1508   SODIUM mmol/L 140 139 139 139   POTASSIUM mmol/L 4.0 4.1 4.3 4.4   CHLORIDE mmol/L 106 108* 107 104   CO2 mmol/L 22.0 19.0* 17.0* 17.0*   BUN mg/dL 74* 76* 63* 58*   CREATININE mg/dL 3.55* 3.72* 3.52* 3.41*   CALCIUM mg/dL 8.4* 8.2* 8.5* 8.6   BILIRUBIN mg/dL 0.3 0.3  --  0.4   ALK PHOS U/L 124* 133*  --  142*   ALT (SGPT) U/L 9 8  --  12   AST (SGOT) U/L 16 17  --  26   GLUCOSE mg/dL 100* 104* 115* 85       Culture Data:   Blood Culture   Date Value Ref Range Status   01/10/2025 No growth at 3 days  Preliminary   01/10/2025 No growth at 3 days  Preliminary       Radiology Data:   Imaging Results (Last 24 Hours)       Procedure Component Value Units Date/Time    US Renal Bilateral [[id number]] Collected: 01/12/25 1641     Updated: 01/12/25 1648    Narrative:      US RENAL BILATERAL- 1/12/2025 12:40 PM     HISTORY: CKD; I50.31-Acute diastolic (congestive) heart failure;  N18.4-Chronic kidney disease, stage 4 (severe); I10-Essential  (primary)  hypertension; R00.1-Bradycardia, unspecified; R05.8-Other specified  cough; R79.89-Other specified abnormal findings of blood chemistry     COMPARISON: 7/12/2021     TECHNIQUE: Multiple longitudinal and transverse realtime sonographic  images of the kidneys and urinary bladder are obtained. Images stored in  PACS [facility] institutional protocol.     FINDINGS:      Trace perihepatic ascites. Cirrhotic liver morphology. Bilateral pleural  effusions.     Right kidney measures 9.5 x 4.1 x 4.6 cm. 1.7 cm cyst without complex  features. Increased renal cortical echogenicity. No hydronephrosis.     Left kidney: 9.6 x 5.3 x 5.1 cm. Simple appearing left renal cyst  without complex features measuring up to 1.1 cm.     Bladder is trabeculated. Prostate is borderline enlarged measuring 3.6 x  3.0 x 4.9 cm.       Impression:      1. Increased bilateral renal cortical echogenicity which can be seen  with medical renal disease. No hydronephrosis.  2. Cirrhotic liver morphology with trace ascites and bilateral pleural  effusions.  3. Trabeculated bladder which may be related to chronic bladder outlet  obstruction.           This report was signed and finalized on 1/12/2025 4:45 PM by Jake Briseno.               I have reviewed the patient's current medications.     Assessment/Plan   Assessment  Active Hospital Problems    Diagnosis     **Acute on chronic diastolic congestive heart failure     Acute respiratory failure with hypoxia     Bradycardia     History of pneumonia, current treatment with cefdinir     CKD (chronic kidney disease) stage 4, GFR 15-29 ml/min     Essential hypertension          Medical Decision Making  Number and Complexity of problems:   Resistant hypertension  CKD stage IV-stable creatinine  Acute on chronic diastolic CHF  Metabolic acidosis  Anemia of chronic kidney disease  Mildly elevated TSH  Bilateral pleural effusion  Bradycardia    Treatment Plan  Reviewed literature regarding resistant  hypertension:  This is defined as  a blood pressure that remains above goal despite concurrent use of three antihypertensive agents of different classes taken at maximally tolerated doses and at appropriate dosing frequency, one of which should be a diuretic     Current antihypertensive medications include:    clonidine 0.3 mg 3 times daily  Hydralazine 100 mg 3 times daily  Lasix 40 mg IV twice daily  Nifedipine  mg daily  Valsartan 320 mg daily  Spironolactone 25 mg daily.            Calcium is 8.4, will check PTH  Potassium is normal at 4, sodium 140 (I would expect elevated sodium and low potassium and hyperaldosteronism) creatinine 3.5  TSH is 8.5  Will check plasma aldosterone plasma renin activity.  Will discuss with nephrology  plasma or 24-hour urine metanephrines (I noticed the last part was already ordered.)        Conditions and Status     Fair     MDM Data  External documents reviewed: Epic record  Cardiac tracing (EKG, telemetry) interpretation: -  Radiology interpretation: Renal ultrasound reviewed.  Medical renal disease with no hydronephrosis; reported cirrhotic liver morphology with trace ascites and bilateral effusion.    Labs reviewed: Yes  Any tests that were considered but not ordered: None     Decision rules/scores evaluated (example [id number], Wells, etc): -     Discussed with: Patient     Care Planning  Shared decision making: Patient and consultants  Code status and discussions: Full code    Disposition  Social Determinants of Health that impact treatment or disposition: None identified at this time  I expect the patient to be discharged to (TBD)          Electronically signed by Jamie Pineda MD, 01/13/25, 16:06 CST.

## 2025-01-13 NOTE — PLAN OF CARE
Goal Outcome Evaluation:  Plan of Care Reviewed With: patient           Outcome Evaluation: Full assessment completed on 1/11. Pt denies any recent weight loss or poor appetite and notes an UBW of 150 lb. Pt did note a 20 lb weight loss since hernia surgery two years ago. Intake in the last 72 hours is 85%; PO fluids 753 mL. Denies any N/V, problems chewing/swallowing, or food allergies. Pt has Crohn's disease but notes no persistent diarrhea. Edentulous, but family brought his dentures back. Would like to stay on a soft foods diet. Last BM 1/9. New labs: BUN 74, Creat 3.44, Alb 3.4, Glu 100. Pt was advised on available snacks, and protein and calorie needs were explained. Pt encouraged to follow up with dietitian. Will monitor.

## 2025-01-13 NOTE — PLAN OF CARE
Goal Outcome Evaluation:           Progress: no change  Outcome Evaluation: SB 42-40

## 2025-01-13 NOTE — PROGRESS NOTES
[facility] -  Progress Note     LOS: 3 days   Patient Care Team:  Nathan Zimmer MD as PCP - General (Internal Medicine)  Adrien Brewster MD as Consulting Physician (Gastroenterology)  Eleuterio Farley MD (Inactive) as Cardiologist (Cardiology)  Elena Jon APRN as Referring Physician (Vascular Surgery)  Bolivar Rueda MD as Consulting Physician (Nephrology)  Slava Tate APRN as Nurse Practitioner (Family Medicine)  New Omalley MD as Consulting Physician (Pulmonary Disease)  Becky Camacho APRN as Nurse Practitioner (Hematology and Oncology)  Gil Pineda DO as Consulting Physician (Vascular Surgery)    Chief Complaint:Shortness of Breath    Subjective     Interval History:   Shortness of breath some improved. Bradycardia improved. Denies chest pain. Blood pressure remains elevated. Denies chest pain. Good urine output.     Review of Systems:     Review of Systems   Constitutional:  Positive for fatigue and unexpected weight change.   Respiratory:  Positive for shortness of breath.      Objective     Vital Sign Min/Max for last 24 hours  Temp  Min: 97.7 °F (36.5 °C)  Max: 99.2 °F (37.3 °C)   BP  Min: 160/74  Max: 203/57   Pulse  Min: 49  Max: 54   Resp  Min: 16  Max: 18   SpO2  Min: 98 %  Max: 100 %   No data recorded   Weight  Min: 66.6 kg (146 lb 12.8 oz)  Max: 66.6 kg (146 lb 12.8 oz)         01/13/25  0500   Weight: 66.6 kg (146 lb 12.8 oz)       Telemetry: Sinus Bradycardia 52-70      Physical Exam:    Constitutional:       Appearance: Underweight. Frail. Chronically ill-appearing.      Interventions: Face mask in place.      Comments: frail   Eyes:      Pupils: Pupils are equal, round, and reactive to light.   HENT:      Head: Normocephalic and atraumatic.    Mouth/Throat:      Pharynx: Oropharynx is clear.   Neck:      Vascular: No carotid bruit or JVD.   Pulmonary:      Effort: Pulmonary effort is normal.      Breath sounds:  Decreased breath sounds present.   Cardiovascular:      Bradycardia present. Regular rhythm.      Murmurs: There is no murmur.   Pulses:     Intact distal pulses.   Edema:     Peripheral edema absent.   Abdominal:      General: Bowel sounds are normal.      Palpations: Abdomen is soft.   Musculoskeletal: Normal range of motion.      Cervical back: Normal range of motion and neck supple. Skin:     General: Skin is warm and dry.   Neurological:      Mental Status: Alert, oriented to person, place, and time and oriented to person, place and time.      Deep Tendon Reflexes: Reflexes are normal and symmetric.   Psychiatric:         Behavior: Behavior normal.         Thought Content: Thought content normal.         Judgment: Judgment normal.       Results Review:   Lab Results (last 72 hours)       Procedure Component Value Units Date/Time    Comprehensive Metabolic Panel [[id number]]  (Abnormal) Collected: 01/13/25 0439    Specimen: Blood Updated: 01/13/25 0553     Glucose 100 mg/dL      BUN 74 mg/dL      Creatinine 3.55 mg/dL      Sodium 140 mmol/L      Potassium 4.0 mmol/L      Chloride 106 mmol/L      CO2 22.0 mmol/L      Calcium 8.4 mg/dL      Total Protein 5.7 g/dL      Albumin 3.4 g/dL      ALT (SGPT) 9 U/L      AST (SGOT) 16 U/L      Alkaline Phosphatase 124 U/L      Total Bilirubin 0.3 mg/dL      Globulin 2.3 gm/dL      A/G Ratio 1.5 g/dL      BUN/Creatinine Ratio 20.8     Anion Gap 12.0 mmol/L      eGFR 17.1 mL/min/1.73     Narrative:      GFR Categories in Chronic Kidney Disease (CKD)      GFR Category          GFR (mL/min/1.73)    Interpretation  G1                     90 or greater         Normal or high (1)  G2                      60-89                Mild decrease (1)  G3a                   45-59                Mild to moderate decrease  G3b                   30-44                Moderate to severe decrease  G4                    15-29                Severe decrease  G5                    14 or less            Kidney failure          (1)In the absence of evidence of kidney disease, neither GFR category G1 or G2 fulfill the criteria for CKD.    eGFR calculation 2021 CKD-EPI creatinine equation, which does not include race as a factor    Uric Acid [[id number]]  (Abnormal) Collected: 01/13/25 0439    Specimen: Blood Updated: 01/13/25 0553     Uric Acid 10.4 mg/dL     Iron Profile [[id number]]  (Abnormal) Collected: 01/13/25 0439    Specimen: Blood Updated: 01/13/25 0553     Iron 78 mcg/dL      Iron Saturation (TSAT) 34 %      Transferrin 154 mg/dL      TIBC 229 mcg/dL     CBC & Differential [[id number]]  (Abnormal) Collected: 01/13/25 0439    Specimen: Blood Updated: 01/13/25 0530    Narrative:      The following orders were created for panel order CBC & Differential.  Procedure                               Abnormality         Status                     ---------                               -----------         ------                     CBC Auto Differential[[id number]]        Abnormal            Final result                 Please view results for these tests on the individual orders.    CBC Auto Differential [[id number]]  (Abnormal) Collected: 01/13/25 0439    Specimen: Blood Updated: 01/13/25 0530     WBC 4.44 10*3/mm3      RBC 2.78 10*6/mm3      Hemoglobin 9.0 g/dL      Hematocrit 28.7 %      .2 fL      MCH 32.4 pg      MCHC 31.4 g/dL      RDW 16.0 %      RDW-SD 61.2 fl      MPV 10.0 fL      Platelets 78 10*3/mm3      Neutrophil % 66.3 %      Lymphocyte % 14.9 %      Monocyte % 12.4 %      Eosinophil % 5.0 %      Basophil % 0.9 %      Immature Grans % 0.5 %      Neutrophils, Absolute 2.95 10*3/mm3      Lymphocytes, Absolute 0.66 10*3/mm3      Monocytes, Absolute 0.55 10*3/mm3      Eosinophils, Absolute 0.22 10*3/mm3      Basophils, Absolute 0.04 10*3/mm3      Immature Grans, Absolute 0.02 10*3/mm3      nRBC 0.0 /100 WBC     Eosinophil Smear - Urine, Urine, Clean Catch [[id number]]  (Normal) Collected: 01/12/25 1100     Specimen: Urine, Clean Catch Updated: 01/12/25 2123     Eosinophil Smear 0 % EOS/100 Cells     Creatinine Urine Random (kidney function) GFR component - Urine, Clean Catch [[id number]] Collected: 01/12/25 1100    Specimen: Urine, Clean Catch Updated: 01/12/25 2054     Creatinine, Urine 26.1 mg/dL     Narrative:      Reference intervals for random urine have not been established.  Clinical usage is dependent upon physician's interpretation in combination with other laboratory tests.       PTH, Intact [[id number]]  (Abnormal) Collected: 01/12/25 1641    Specimen: Blood Updated: 01/12/25 1708     PTH, Intact 236.0 pg/mL     Narrative:      Results may be falsely decreased if patient taking Biotin.      Urinalysis With Microscopic If Indicated (No Culture) - Urine, Clean Catch [[id number]]  (Abnormal) Collected: 01/12/25 1100    Specimen: Urine, Clean Catch Updated: 01/12/25 1647     Color, UA Yellow     Appearance, UA Clear     pH, UA 5.5     Specific Gravity, UA 1.010     Glucose, UA Negative     Ketones, UA Negative     Bilirubin, UA Negative     Blood, UA Negative     Protein,  mg/dL (2+)     Leuk Esterase, UA Negative     Nitrite, UA Negative     Urobilinogen, UA 0.2 E.U./dL    Urinalysis, Microscopic Only - Urine, Clean Catch [[id number]]  (Abnormal) Collected: 01/12/25 1100    Specimen: Urine, Clean Catch Updated: 01/12/25 1647     RBC, UA 3-5 /HPF      WBC, UA 0-2 /HPF      Bacteria, UA None Seen /HPF      Squamous Epithelial Cells, UA None Seen /HPF      Hyaline Casts, UA 0-2 /LPF      Methodology Automated Microscopy    Sodium, Urine, Random - Urine, Clean Catch [[id number]] Collected: 01/12/25 1100    Specimen: Urine, Clean Catch Updated: 01/12/25 1638     Sodium, Urine 111 mmol/L     Narrative:      Reference intervals for random urine have not been established.  Clinical usage is dependent upon physician's interpretation in combination with other laboratory tests.       Lipid Panel [[id number]]   (Abnormal) Collected: 01/12/25 0401    Specimen: Blood Updated: 01/12/25 1630     Total Cholesterol 104 mg/dL      Triglycerides 59 mg/dL      HDL Cholesterol 31 mg/dL      LDL Cholesterol  60 mg/dL      VLDL Cholesterol 13 mg/dL      LDL/HDL Ratio 1.97    Narrative:      Cholesterol Reference Ranges  (U.[place] Department of Health and Human Services ATP III Classifications)    Desirable          <200 mg/dL  Borderline High    200-239 mg/dL  High Risk          >240 mg/dL      Triglyceride Reference Ranges  ([place].S. Department of Health and Human Services ATP III Classifications)    Normal           <150 mg/dL  Borderline High  150-199 mg/dL  High             200-499 mg/dL  Very High        >500 mg/dL    HDL Reference Ranges  ([place].S. Department of Health and Human Services ATP III Classifications)    Low     <40 mg/dl (major risk factor for CHD)  High    >60 mg/dl ('negative' risk factor for CHD)        LDL Reference Ranges  (U.S. Department of Health and Human Services ATP III Classifications)    Optimal          <100 mg/dL  Near Optimal     100-129 mg/dL  Borderline High  130-159 mg/dL  High             160-189 mg/dL  Very High        >189 mg/dL    Blood Culture - Blood, Arm, Right [[id number]]  (Normal) Collected: 01/10/25 1508    Specimen: Blood from Arm, Right Updated: 01/12/25 1531     Blood Culture No growth at 2 days    Blood Culture - Blood, Arm, Right [[id number]]  (Normal) Collected: 01/10/25 1440    Specimen: Blood from Arm, Right Updated: 01/12/25 1500     Blood Culture No growth at 2 days    Sodium, Urine, Random - Urine, Clean Catch [[id number]] Collected: 01/12/25 1100    Specimen: Urine, Clean Catch Updated: 01/12/25 1130     Sodium, Urine 113 mmol/L     Narrative:      Reference intervals for random urine have not been established.  Clinical usage is dependent upon physician's interpretation in combination with other laboratory tests.       Protein, Urine, Random - Urine, Clean Catch [[id number]] Collected:  01/12/25 1100    Specimen: Urine, Clean Catch Updated: 01/12/25 1130     Total Protein, Urine 50.3 mg/dL     Narrative:      Reference intervals for random urine have not been established.  Clinical usage is dependent upon physician's interpretation in combination with other laboratory tests.       Osmolality, Urine - Urine, Clean Catch [[id number]]  (Normal) Collected: 01/12/25 1100    Specimen: Urine, Clean Catch Updated: 01/12/25 1123     Osmolality, Urine 333 mOsm/kg     Comprehensive Metabolic Panel [[id number]]  (Abnormal) Collected: 01/12/25 0401    Specimen: Blood Updated: 01/12/25 0508     Glucose 104 mg/dL      BUN 76 mg/dL      Creatinine 3.72 mg/dL      Sodium 139 mmol/L      Potassium 4.1 mmol/L      Chloride 108 mmol/L      CO2 19.0 mmol/L      Calcium 8.2 mg/dL      Total Protein 5.7 g/dL      Albumin 3.4 g/dL      ALT (SGPT) 8 U/L      AST (SGOT) 17 U/L      Alkaline Phosphatase 133 U/L      Total Bilirubin 0.3 mg/dL      Globulin 2.3 gm/dL      A/G Ratio 1.5 g/dL      BUN/Creatinine Ratio 20.4     Anion Gap 12.0 mmol/L      eGFR 16.1 mL/min/1.73     Narrative:      GFR Categories in Chronic Kidney Disease (CKD)      GFR Category          GFR (mL/min/1.73)    Interpretation  G1                     90 or greater         Normal or high (1)  G2                      60-89                Mild decrease (1)  G3a                   45-59                Mild to moderate decrease  G3b                   30-44                Moderate to severe decrease  G4                    15-29                Severe decrease  G5                    14 or less           Kidney failure          (1)In the absence of evidence of kidney disease, neither GFR category G1 or G2 fulfill the criteria for CKD.    eGFR calculation 2021 CKD-EPI creatinine equation, which does not include race as a factor    CBC & Differential [[id number]]  (Abnormal) Collected: 01/12/25 0401    Specimen: Blood Updated: 01/12/25 0504    Narrative:      The  following orders were created for panel order CBC & Differential.  Procedure                               Abnormality         Status                     ---------                               -----------         ------                     CBC Auto Differential[[id number]]        Abnormal            Final result                 Please view results for these tests on the individual orders.    CBC Auto Differential [[id number]]  (Abnormal) Collected: 01/12/25 0401    Specimen: Blood Updated: 01/12/25 0504     WBC 5.58 10*3/mm3      RBC 2.73 10*6/mm3      Hemoglobin 8.8 g/dL      Hematocrit 28.6 %      .8 fL      MCH 32.2 pg      MCHC 30.8 g/dL      RDW 16.3 %      RDW-SD 62.5 fl      MPV 10.2 fL      Platelets 94 10*3/mm3      Neutrophil % 69.9 %      Lymphocyte % 14.5 %      Monocyte % 11.1 %      Eosinophil % 2.9 %      Basophil % 0.7 %      Immature Grans % 0.9 %      Neutrophils, Absolute 3.90 10*3/mm3      Lymphocytes, Absolute 0.81 10*3/mm3      Monocytes, Absolute 0.62 10*3/mm3      Eosinophils, Absolute 0.16 10*3/mm3      Basophils, Absolute 0.04 10*3/mm3      Immature Grans, Absolute 0.05 10*3/mm3      nRBC 0.0 /100 WBC     Basic Metabolic Panel [[id number]]  (Abnormal) Collected: 01/11/25 0522    Specimen: Blood Updated: 01/11/25 0603     Glucose 115 mg/dL      BUN 63 mg/dL      Creatinine 3.52 mg/dL      Sodium 139 mmol/L      Potassium 4.3 mmol/L      Chloride 107 mmol/L      CO2 17.0 mmol/L      Calcium 8.5 mg/dL      BUN/Creatinine Ratio 17.9     Anion Gap 15.0 mmol/L      eGFR 17.2 mL/min/1.73     Narrative:      GFR Categories in Chronic Kidney Disease (CKD)      GFR Category          GFR (mL/min/1.73)    Interpretation  G1                     90 or greater         Normal or high (1)  G2                      60-89                Mild decrease (1)  G3a                   45-59                Mild to moderate decrease  G3b                   30-44                Moderate to severe decrease  G4                     15-29                Severe decrease  G5                    14 or less           Kidney failure          (1)In the absence of evidence of kidney disease, neither GFR category G1 or G2 fulfill the criteria for CKD.    eGFR calculation 2021 CKD-EPI creatinine equation, which does not include race as a factor    CBC (No Diff) [[id number]]  (Abnormal) Collected: 01/11/25 0522    Specimen: Blood Updated: 01/11/25 0544     WBC 4.38 10*3/mm3      RBC 3.02 10*6/mm3      Hemoglobin 9.7 g/dL      Hematocrit 30.8 %      .0 fL      MCH 32.1 pg      MCHC 31.5 g/dL      RDW 16.1 %      RDW-SD 60.9 fl      MPV 10.2 fL      Platelets 102 10*3/mm3     High Sensitivity Troponin T 1Hr [[id number]]  (Abnormal) Collected: 01/10/25 1613    Specimen: Blood Updated: 01/10/25 1641     HS Troponin T 62 ng/L      Troponin T Numeric Delta -3 ng/L      Troponin T % Delta -5 %     Narrative:      High Sensitive Troponin T Reference Range:  <14.0 ng/L- Negative Female for AMI  <22.0 ng/L- Negative Male for AMI  >=14 - Abnormal Female indicating possible myocardial injury.  >=22 - Abnormal Male indicating possible myocardial injury.   Clinicians would have to utilize clinical acumen, EKG, Troponin, and serial changes to determine if it is an Acute Myocardial Infarction or myocardial injury due to an underlying chronic condition.         Respiratory Panel PCR w/COVID-19(SARS-CoV-2) TOSIN/JOVAN/ANTONIO/PAD/COR/CHARLENE In-House, NP Swab in UTM/VTM, 2 HR TAT - Swab, Nasopharynx [[id number]]  (Normal) Collected: 01/10/25 1434    Specimen: Swab from Nasopharynx Updated: 01/10/25 1553     ADENOVIRUS, PCR Not Detected     Coronavirus 229E Not Detected     Coronavirus HKU1 Not Detected     Coronavirus NL63 Not Detected     Coronavirus OC43 Not Detected     COVID19 Not Detected     Human Metapneumovirus Not Detected     Human Rhinovirus/Enterovirus Not Detected     Influenza A PCR Not Detected     Influenza B PCR Not Detected     Parainfluenza Virus 1  "Not Detected     Parainfluenza Virus 2 Not Detected     Parainfluenza Virus 3 Not Detected     Parainfluenza Virus 4 Not Detected     RSV, PCR Not Detected     Bordetella pertussis pcr Not Detected     Bordetella parapertussis PCR Not Detected     Chlamydophila pneumoniae PCR Not Detected     Mycoplasma pneumo by PCR Not Detected    Narrative:      In the setting of a positive respiratory panel with a viral infection PLUS a negative procalcitonin without other underlying concern for bacterial infection, consider observing off antibiotics or discontinuation of antibiotics and continue supportive care. If the respiratory panel is positive for atypical bacterial infection (Bordetella pertussis, Chlamydophila pneumoniae, or Mycoplasma pneumoniae), consider antibiotic de-escalation to target atypical bacterial infection.    Procalcitonin [[id number]]  (Abnormal) Collected: 01/10/25 1508    Specimen: Blood Updated: 01/10/25 1546     Procalcitonin 0.26 ng/mL     Narrative:      As a Marker for Sepsis (Non-Neonates):    1. <0.5 ng/mL represents a low risk of severe sepsis and/or septic shock.  2. >2 ng/mL represents a high risk of severe sepsis and/or septic shock.    As a Marker for Lower Respiratory Tract Infections that require antibiotic therapy:    PCT on Admission    Antibiotic Therapy       6-12 Hrs later    >0.5                Strongly Recommended  >0.25 - <0.5        Recommended   0.1 - 0.25          Discouraged              Remeasure/reassess PCT  <0.1                Strongly Discouraged     Remeasure/reassess PCT    As 28 day mortality risk marker: \"Change in Procalcitonin Result\" (>80% or <=80%) if Day 0 (or Day 1) and Day 4 values are available. Refer to http://www.bras-pct-calculator.com    Change in PCT <=80%  A decrease of PCT levels below or equal to 80% defines a positive change in PCT test result representing a higher risk for 28-day all-cause mortality of patients diagnosed with severe sepsis for septic " shock.    Change in PCT >80%  A decrease of PCT levels of more than 80% defines a negative change in PCT result representing a lower risk for 28-day all-cause mortality of patients diagnosed with severe sepsis or septic shock.       Comprehensive Metabolic Panel [[id number]]  (Abnormal) Collected: 01/10/25 1508    Specimen: Blood Updated: 01/10/25 1543     Glucose 85 mg/dL      BUN 58 mg/dL      Creatinine 3.41 mg/dL      Sodium 139 mmol/L      Potassium 4.4 mmol/L      Comment: Slight hemolysis detected by analyzer. Result may be falsely elevated.        Chloride 104 mmol/L      CO2 17.0 mmol/L      Calcium 8.6 mg/dL      Total Protein 6.1 g/dL      Albumin 3.5 g/dL      ALT (SGPT) 12 U/L      AST (SGOT) 26 U/L      Comment: Slight hemolysis detected by analyzer. Result may be falsely elevated.        Alkaline Phosphatase 142 U/L      Total Bilirubin 0.4 mg/dL      Globulin 2.6 gm/dL      A/G Ratio 1.3 g/dL      BUN/Creatinine Ratio 17.0     Anion Gap 18.0 mmol/L      eGFR 17.9 mL/min/1.73     Narrative:      GFR Categories in Chronic Kidney Disease (CKD)      GFR Category          GFR (mL/min/1.73)    Interpretation  G1                     90 or greater         Normal or high (1)  G2                      60-89                Mild decrease (1)  G3a                   45-59                Mild to moderate decrease  G3b                   30-44                Moderate to severe decrease  G4                    15-29                Severe decrease  G5                    14 or less           Kidney failure          (1)In the absence of evidence of kidney disease, neither GFR category G1 or G2 fulfill the criteria for CKD.    eGFR calculation 2021 CKD-EPI creatinine equation, which does not include race as a factor    High Sensitivity Troponin T [[id number]]  (Abnormal) Collected: 01/10/25 1508    Specimen: Blood Updated: 01/10/25 1543     HS Troponin T 65 ng/L     Narrative:      High Sensitive Troponin T Reference  Range:  <14.0 ng/L- Negative Female for AMI  <22.0 ng/L- Negative Male for AMI  >=14 - Abnormal Female indicating possible myocardial injury.  >=22 - Abnormal Male indicating possible myocardial injury.   Clinicians would have to utilize clinical acumen, EKG, Troponin, and serial changes to determine if it is an Acute Myocardial Infarction or myocardial injury due to an underlying chronic condition.         BNP [[id number]]  (Abnormal) Collected: 01/10/25 1508    Specimen: Blood Updated: 01/10/25 1540     proBNP 32,562.0 pg/mL     Narrative:      This assay is used as an aid in the diagnosis of individuals suspected of having heart failure. It can be used as an aid in the diagnosis of acute decompensated heart failure (ADHF) in patients presenting with signs and symptoms of ADHF to the emergency department (ED). In addition, NT-proBNP of <300 pg/mL indicates ADHF is not likely.    Age Range Result Interpretation  NT-proBNP Concentration (pg/mL:      <50             Positive            >450                   Gray                 300-450                    Negative             <300    50-75           Positive            >900                  Gray                300-900                  Negative            <300      >75             Positive            >1800                  Gray                300-1800                  Negative            <300    Magnesium [[id number]]  (Normal) Collected: 01/10/25 1508    Specimen: Blood Updated: 01/10/25 1540     Magnesium 2.2 mg/dL     Lactic Acid, Plasma [[id number]]  (Normal) Collected: 01/10/25 1508    Specimen: Blood Updated: 01/10/25 1540     Lactate 0.9 mmol/L     Protime-INR [[id number]]  (Abnormal) Collected: 01/10/25 1508    Specimen: Blood Updated: 01/10/25 1529     Protime 18.1 Seconds      INR 1.41    aPTT [[id number]]  (Normal) Collected: 01/10/25 1508    Specimen: Blood Updated: 01/10/25 1529     PTT 33.6 seconds     CBC & Differential [[id number]]  (Abnormal) Collected:  01/10/25 1508    Specimen: Blood Updated: 01/10/25 1525    Narrative:      The following orders were created for panel order CBC & Differential.  Procedure                               Abnormality         Status                     ---------                               -----------         ------                     CBC Auto Differential[[id number]]        Abnormal            Final result                 Please view results for these tests on the individual orders.    CBC Auto Differential [[id number]]  (Abnormal) Collected: 01/10/25 1508    Specimen: Blood Updated: 01/10/25 1525     WBC 5.17 10*3/mm3      RBC 2.77 10*6/mm3      Hemoglobin 9.0 g/dL      Hematocrit 28.6 %      .2 fL      MCH 32.5 pg      MCHC 31.5 g/dL      RDW 16.4 %      RDW-SD 62.6 fl      MPV 10.9 fL      Platelets 80 10*3/mm3      Neutrophil % 76.9 %      Lymphocyte % 9.7 %      Monocyte % 7.9 %      Eosinophil % 3.5 %      Basophil % 1.2 %      Immature Grans % 0.8 %      Neutrophils, Absolute 3.98 10*3/mm3      Lymphocytes, Absolute 0.50 10*3/mm3      Monocytes, Absolute 0.41 10*3/mm3      Eosinophils, Absolute 0.18 10*3/mm3      Basophils, Absolute 0.06 10*3/mm3      Immature Grans, Absolute 0.04 10*3/mm3      nRBC 0.0 /100 WBC     Blood Gas, Arterial With Co-Ox [[id number]]  (Abnormal) Collected: 01/10/25 1347    Specimen: Arterial Blood Updated: 01/10/25 1347     Site Right Brachial     Héctor's Test Positive     pH, Arterial 7.351 pH units      pCO2, Arterial 32.3 mm Hg      Comment: 84 Value below reference range        pO2, Arterial 64.5 mm Hg      Comment: 84 Value below reference range        HCO3, Arterial 17.8 mmol/L      Comment: 84 Value below reference range        Base Excess, Arterial -7.0 mmol/L      Comment: 84 Value below reference range        O2 Saturation, Arterial 92.5 %      Comment: 84 Value below reference range        Hemoglobin, Blood Gas 9.1 g/dL      Comment: 84 Value below reference range        Hematocrit,  Blood Gas 27.9 %      Comment: 84 Value below reference range        Oxyhemoglobin 89.5 %      Comment: 84 Value below reference range        Methemoglobin 0.40 %      Carboxyhemoglobin 2.8 %      Temperature 37.0     Sodium, Arterial 139 mmol/L      Potassium, Arterial 3.8 mmol/L      Barometric Pressure for Blood Gas 750 mmHg      Modality Room Air     Ventilator Mode NA     Collected by [id number]     Comment: Meter: [id number]     :  Janeth Montilla CRT        pH, Temp Corrected 7.351 pH Units      pCO2, Temperature Corrected 32.3 mm Hg      pO2, Temperature Corrected 64.5 mm Hg              Results from last 7 days   Lab Units 01/13/25  0439 01/12/25  0401 01/11/25  0522   SODIUM mmol/L 140 139 139   POTASSIUM mmol/L 4.0 4.1 4.3   CHLORIDE mmol/L 106 108* 107   CO2 mmol/L 22.0 19.0* 17.0*   BUN mg/dL 74* 76* 63*   CREATININE mg/dL 3.55* 3.72* 3.52*   GLUCOSE mg/dL 100* 104* 115*   CALCIUM mg/dL 8.4* 8.2* 8.5*         Echo EF Estimated  Results for orders placed during the hospital encounter of 01/10/25    Adult Transthoracic Echo Complete w/ Color, Spectral and Contrast if Necessary Per Protocol    Interpretation Summary    Left ventricular systolic function is normal. Left ventricular ejection fraction appears to be 56 - 60%.    Left ventricular wall thickness is consistent with mild septal asymmetric hypertrophy.    Left ventricular diastolic function is consistent with (grade II w/high LAP) pseudonormalization.    Normal right ventricular cavity size and systolic function noted.    The left atrial cavity is mild to moderately dilated.    The right atrial cavity is dilated.    Mild aortic valve stenosis is present.    Mild to moderate mitral valve regurgitation is present.    Moderate to severe tricuspid valve regurgitation is present.    Estimated right ventricular systolic pressure from tricuspid regurgitation is markedly elevated (>55 mmHg).       Medication Review: yes  Current  Facility-Administered Medications   Medication Dose Route Frequency Provider Last Rate Last Admin    acetaminophen (TYLENOL) tablet 650 mg  650 mg Oral Q4H PRN Katherine Camarena APRN        Or    acetaminophen (TYLENOL) 160 MG/5ML oral solution 650 mg  650 mg Oral Q4H PRN Katherine Camarena, APRSHERITA        Or    acetaminophen (TYLENOL) suppository 650 mg  650 mg Rectal Q4H PRN Katherine Camarena, APRN        aspirin EC tablet 81 mg  81 mg Oral Daily Katherine Camarena APRN   81 mg at 01/13/25 0818    atorvastatin (LIPITOR) tablet 10 mg  10 mg Oral Daily Katherine Camarena, APRN   10 mg at 01/13/25 0819    sennosides-docusate (PERICOLACE) 8.6-50 MG per tablet 2 tablet  2 tablet Oral BID PRN Katherine Camarena APRN        And    polyethylene glycol (MIRALAX) packet 17 g  17 g Oral Daily PRN Katherine Camarena APRSHERITA        And    bisacodyl (DULCOLAX) EC tablet 5 mg  5 mg Oral Daily PRN Katherine Camarena APRN        And    bisacodyl (DULCOLAX) suppository 10 mg  10 mg Rectal Daily PRN Katherine Camarena, APRN        cetirizine (zyrTEC) tablet 10 mg  10 mg Oral Daily Katherine Camarena APRN   10 mg at 01/13/25 0819    cholestyramine (QUESTRAN) packet 1 packet  1 packet Oral Daily Katherine Camarena APRN   1 packet at 01/13/25 0818    cloNIDine (CATAPRES) tablet 0.3 mg  0.3 mg Oral TID Vidal Miramontes MD   0.3 mg at 01/13/25 0819    clopidogrel (PLAVIX) tablet 75 mg  75 mg Oral Daily Katherine Camarena APRN   75 mg at 01/13/25 0819    docusate sodium (COLACE) capsule 100 mg  100 mg Oral TID Katherine Camarena APRN   100 mg at 01/13/25 0818    empagliflozin (JARDIANCE) tablet 10 mg  10 mg Oral Daily Morales Schrader MD   10 mg at 01/13/25 0819    fluticasone (FLONASE) 50 MCG/ACT nasal spray 2 spray  2 spray Each Nare Daily Katherine aCmarena APRN   2 spray at 01/12/25 0818    furosemide (LASIX) injection 40 mg  40 mg Intravenous BID Diuretics Katherine Camarena APRN   40 mg at 01/13/25 0818    hydrALAZINE (APRESOLINE) injection  10 mg  10 mg Intravenous Q4H PRN Vidal Miramontes MD   10 mg at 01/11/25 0544    hydrALAZINE (APRESOLINE) tablet 100 mg  100 mg Oral TID Katherine Camarena APRN   100 mg at 01/13/25 0819    levothyroxine (SYNTHROID, LEVOTHROID) tablet 75 mcg  75 mcg Oral Q AM Katherine Camarena, APRN   75 mcg at 01/13/25 0620    melatonin tablet 10 mg  10 mg Oral Nightly Katherine Camarena, APRN   10 mg at 01/12/25 2110    mesalamine (APRISO) 24 hr capsule 1.5 g  1.5 g Oral Daily Katherine Camarena, APRN   1.5 g at 01/13/25 0818    montelukast (SINGULAIR) tablet 10 mg  10 mg Oral Nightly Katherine Camarena, APRN   10 mg at 01/12/25 2110    NIFEdipine XL (PROCARDIA XL) 24 hr tablet 120 mg  120 mg Oral Daily Morales Schrader MD   120 mg at 01/13/25 0818    nitroglycerin (NITROSTAT) SL tablet 0.4 mg  0.4 mg Sublingual Q5 Min PRN Katherine Camarena APRN        pantoprazole (PROTONIX) EC tablet 40 mg  40 mg Oral Q AM Katherine Camarena APRN   40 mg at 01/13/25 0620    promethazine (PHENERGAN) tablet 12.5 mg  12.5 mg Oral Q6H PRN Katherine Camarena APRN        sodium bicarbonate tablet 1,300 mg  1,300 mg Oral Q12H Katherine Camarena, APRN   1,300 mg at 01/13/25 0819    sodium bicarbonate tablet 650 mg  650 mg Oral Daily With Lunch Katherine Camarena, APRN   650 mg at 01/12/25 1226    sodium chloride 0.9 % flush 10 mL  10 mL Intravenous Q12H Katherine Camarena APRN   10 mL at 01/13/25 0820    sodium chloride 0.9 % flush 10 mL  10 mL Intravenous PRN Katherine Camarena APRN        sodium chloride 0.9 % infusion 40 mL  40 mL Intravenous PRN Katherine Camarena APRN        spironolactone (ALDACTONE) tablet 25 mg  25 mg Oral Daily Jagdeep Moore APRN        tamsulosin (FLOMAX) 24 hr capsule 0.4 mg  0.4 mg Oral Nightly Katherine Camarena APRN   0.4 mg at 01/12/25 2110    valsartan (DIOVAN) tablet 320 mg  320 mg Oral Daily Katherine Camarena APRN   320 mg at 01/13/25 0818         Assessment & Plan       Acute on chronic diastolic congestive heart  failure    Essential hypertension    CKD (chronic kidney disease) stage 4, GFR 15-29 ml/min    Acute respiratory failure with hypoxia    Bradycardia    History of pneumonia, current treatment with cefdinir    Plan:  Acute on Chronic Diastolic Congestive Heart Failure - Volume status improving. Notes his breathing has improved. Restart aldactone. Jardiance 10 mg daily. Currently receiving IV Lasix. Could consider transitioning to Entresto for BP control and diastolic congestive heart failure. Low Salt Diet. Daily weights. Patient appears frail and has lost over 20 pounds.     Hypertension- blood pressure persists high. Work up ordered by Dr. Mcguire. Resume Aldactone. On Valsartan 320 daily, Hydrlazine 100 mg TID, Clonidine 0.3 mg TID and Clonidine patch- these are all home doses. Home Aldactone and Coreg on hold. Resume Aldactone. Nifedipine increased from 90 to 120.     Chronic Kidney Disease- stable. Nephrology following.    Bilateral Pleural Effusions- diuresing with close monitoring of renal function.     Bradycardia- Coreg held on admission. Still bradycardic but stable. On nifedipine.     Electronically signed by STERLING Finnegan, 01/13/25, 11:08 AM CST.

## 2025-01-14 ENCOUNTER — APPOINTMENT (OUTPATIENT)
Dept: ULTRASOUND IMAGING | Facility: HOSPITAL | Age: 75
End: 2025-01-14
Payer: MEDICARE

## 2025-01-14 PROBLEM — R79.89 ABNORMAL TSH: Status: ACTIVE | Noted: 2025-01-14

## 2025-01-14 LAB
ALBUMIN SERPL-MCNC: 3.3 G/DL (ref 3.5–5.2)
ALBUMIN/GLOB SERPL: 1.4 G/DL
ALP SERPL-CCNC: 140 U/L (ref 39–117)
ALT SERPL W P-5'-P-CCNC: 9 U/L (ref 1–41)
ANION GAP SERPL CALCULATED.3IONS-SCNC: 14 MMOL/L (ref 5–15)
AST SERPL-CCNC: 16 U/L (ref 1–40)
BASOPHILS # BLD AUTO: 0.06 10*3/MM3 (ref 0–0.2)
BASOPHILS NFR BLD AUTO: 1.2 % (ref 0–1.5)
BILIRUB SERPL-MCNC: 0.4 MG/DL (ref 0–1.2)
BUN SERPL-MCNC: 77 MG/DL (ref 8–23)
BUN/CREAT SERPL: 19.3 (ref 7–25)
CALCIUM SPEC-SCNC: 8.6 MG/DL (ref 8.6–10.5)
CHLORIDE SERPL-SCNC: 104 MMOL/L (ref 98–107)
CO2 SERPL-SCNC: 22 MMOL/L (ref 22–29)
CORTIS SERPL-MCNC: 12.8 MCG/DL
CREAT SERPL-MCNC: 4 MG/DL (ref 0.76–1.27)
DEPRECATED RDW RBC AUTO: 60.1 FL (ref 37–54)
EGFRCR SERPLBLD CKD-EPI 2021: 14.8 ML/MIN/1.73
EOSINOPHIL # BLD AUTO: 0.23 10*3/MM3 (ref 0–0.4)
EOSINOPHIL NFR BLD AUTO: 4.6 % (ref 0.3–6.2)
ERYTHROCYTE [DISTWIDTH] IN BLOOD BY AUTOMATED COUNT: 15.9 % (ref 12.3–15.4)
GLOBULIN UR ELPH-MCNC: 2.4 GM/DL
GLUCOSE SERPL-MCNC: 108 MG/DL (ref 65–99)
HCT VFR BLD AUTO: 28.5 % (ref 37.5–51)
HGB BLD-MCNC: 8.8 G/DL (ref 13–17.7)
IMM GRANULOCYTES # BLD AUTO: 0.03 10*3/MM3 (ref 0–0.05)
IMM GRANULOCYTES NFR BLD AUTO: 0.6 % (ref 0–0.5)
LYMPHOCYTES # BLD AUTO: 0.8 10*3/MM3 (ref 0.7–3.1)
LYMPHOCYTES NFR BLD AUTO: 16.1 % (ref 19.6–45.3)
MCH RBC QN AUTO: 32 PG (ref 26.6–33)
MCHC RBC AUTO-ENTMCNC: 30.9 G/DL (ref 31.5–35.7)
MCV RBC AUTO: 103.6 FL (ref 79–97)
MONOCYTES # BLD AUTO: 0.55 10*3/MM3 (ref 0.1–0.9)
MONOCYTES NFR BLD AUTO: 11.1 % (ref 5–12)
NEUTROPHILS NFR BLD AUTO: 3.3 10*3/MM3 (ref 1.7–7)
NEUTROPHILS NFR BLD AUTO: 66.4 % (ref 42.7–76)
NRBC BLD AUTO-RTO: 0 /100 WBC (ref 0–0.2)
PLATELET # BLD AUTO: 83 10*3/MM3 (ref 140–450)
PMV BLD AUTO: 9.8 FL (ref 6–12)
POTASSIUM SERPL-SCNC: 4.1 MMOL/L (ref 3.5–5.2)
PROT SERPL-MCNC: 5.7 G/DL (ref 6–8.5)
RBC # BLD AUTO: 2.75 10*6/MM3 (ref 4.14–5.8)
SODIUM SERPL-SCNC: 140 MMOL/L (ref 136–145)
WBC NRBC COR # BLD AUTO: 4.97 10*3/MM3 (ref 3.4–10.8)

## 2025-01-14 PROCEDURE — 25010000002 HYDRALAZINE PER 20 MG: Performed by: HOSPITALIST

## 2025-01-14 PROCEDURE — 93975 VASCULAR STUDY: CPT

## 2025-01-14 PROCEDURE — 25010000002 FUROSEMIDE PER 20 MG: Performed by: NURSE PRACTITIONER

## 2025-01-14 PROCEDURE — 85025 COMPLETE CBC W/AUTO DIFF WBC: CPT | Performed by: INTERNAL MEDICINE

## 2025-01-14 PROCEDURE — 99232 SBSQ HOSP IP/OBS MODERATE 35: CPT | Performed by: NURSE PRACTITIONER

## 2025-01-14 PROCEDURE — 80053 COMPREHEN METABOLIC PANEL: CPT | Performed by: INTERNAL MEDICINE

## 2025-01-14 PROCEDURE — 82533 TOTAL CORTISOL: CPT | Performed by: EMERGENCY MEDICINE

## 2025-01-14 RX ORDER — FUROSEMIDE 40 MG/1
40 TABLET ORAL DAILY
Status: DISCONTINUED | OUTPATIENT
Start: 2025-01-15 | End: 2025-01-16 | Stop reason: HOSPADM

## 2025-01-14 RX ADMIN — MONTELUKAST SODIUM 10 MG: 10 TABLET, COATED ORAL at 21:37

## 2025-01-14 RX ADMIN — CALCITRIOL CAPSULES 0.25 MCG 0.5 MCG: 0.25 CAPSULE ORAL at 09:04

## 2025-01-14 RX ADMIN — DOCUSATE SODIUM 100 MG: 100 CAPSULE, LIQUID FILLED ORAL at 21:36

## 2025-01-14 RX ADMIN — DOCUSATE SODIUM 100 MG: 100 CAPSULE, LIQUID FILLED ORAL at 09:04

## 2025-01-14 RX ADMIN — ATORVASTATIN CALCIUM 10 MG: 10 TABLET, FILM COATED ORAL at 09:05

## 2025-01-14 RX ADMIN — SODIUM BICARBONATE 1300 MG: 650 TABLET ORAL at 09:05

## 2025-01-14 RX ADMIN — SODIUM BICARBONATE 650 MG: 650 TABLET ORAL at 12:36

## 2025-01-14 RX ADMIN — HYDRALAZINE HYDROCHLORIDE 100 MG: 50 TABLET ORAL at 16:59

## 2025-01-14 RX ADMIN — MESALAMINE 1.5 G: 375 CAPSULE, EXTENDED RELEASE ORAL at 09:03

## 2025-01-14 RX ADMIN — PANTOPRAZOLE SODIUM 40 MG: 40 TABLET, DELAYED RELEASE ORAL at 06:06

## 2025-01-14 RX ADMIN — CHOLESTYRAMINE 1 PACKET: 4 POWDER, FOR SUSPENSION ORAL at 09:03

## 2025-01-14 RX ADMIN — HYDRALAZINE HYDROCHLORIDE 100 MG: 50 TABLET ORAL at 09:04

## 2025-01-14 RX ADMIN — HYDRALAZINE HYDROCHLORIDE 10 MG: 20 INJECTION INTRAMUSCULAR; INTRAVENOUS at 12:36

## 2025-01-14 RX ADMIN — Medication 10 MG: at 21:37

## 2025-01-14 RX ADMIN — SPIRONOLACTONE 25 MG: 25 TABLET ORAL at 09:05

## 2025-01-14 RX ADMIN — Medication 10 ML: at 21:37

## 2025-01-14 RX ADMIN — CETIRIZINE HYDROCHLORIDE TABLETS 10 MG: 10 TABLET, FILM COATED ORAL at 09:05

## 2025-01-14 RX ADMIN — HYDRALAZINE HYDROCHLORIDE 100 MG: 50 TABLET ORAL at 21:37

## 2025-01-14 RX ADMIN — EMPAGLIFLOZIN 10 MG: 10 TABLET, FILM COATED ORAL at 09:05

## 2025-01-14 RX ADMIN — LEVOTHYROXINE SODIUM 75 MCG: 75 TABLET ORAL at 06:06

## 2025-01-14 RX ADMIN — CLONIDINE HYDROCHLORIDE 0.3 MG: 0.1 TABLET ORAL at 09:05

## 2025-01-14 RX ADMIN — SODIUM BICARBONATE 1300 MG: 650 TABLET ORAL at 21:37

## 2025-01-14 RX ADMIN — TAMSULOSIN HYDROCHLORIDE 0.4 MG: 0.4 CAPSULE ORAL at 21:36

## 2025-01-14 RX ADMIN — VALSARTAN 320 MG: 80 TABLET, FILM COATED ORAL at 09:03

## 2025-01-14 RX ADMIN — CLOPIDOGREL BISULFATE 75 MG: 75 TABLET ORAL at 09:04

## 2025-01-14 RX ADMIN — DOCUSATE SODIUM 100 MG: 100 CAPSULE, LIQUID FILLED ORAL at 16:59

## 2025-01-14 RX ADMIN — CLONIDINE HYDROCHLORIDE 0.3 MG: 0.1 TABLET ORAL at 16:59

## 2025-01-14 RX ADMIN — Medication 10 ML: at 09:13

## 2025-01-14 RX ADMIN — NIFEDIPINE 120 MG: 60 TABLET, FILM COATED, EXTENDED RELEASE ORAL at 09:03

## 2025-01-14 RX ADMIN — CLONIDINE HYDROCHLORIDE 0.3 MG: 0.1 TABLET ORAL at 21:36

## 2025-01-14 RX ADMIN — ASPIRIN 81 MG: 81 TABLET, COATED ORAL at 09:05

## 2025-01-14 RX ADMIN — FUROSEMIDE 40 MG: 10 INJECTION, SOLUTION INTRAVENOUS at 09:04

## 2025-01-14 NOTE — PROGRESS NOTES
[facility] -  Progress Note     LOS: 4 days   Patient Care Team:  Nathan Zimmer MD as PCP - General (Internal Medicine)  Adrien Brewster MD as Consulting Physician (Gastroenterology)  Eleuterio Farley MD (Inactive) as Cardiologist (Cardiology)  Elena Jon APRN as Referring Physician (Vascular Surgery)  Bolivar Rueda MD as Consulting Physician (Nephrology)  Slava Tate APRN as Nurse Practitioner (Family Medicine)  New Omalley MD as Consulting Physician (Pulmonary Disease)  Becky Camacho APRN as Nurse Practitioner (Hematology and Oncology)  Gil Pineda DO as Consulting Physician (Vascular Surgery)    Chief Complaint:Shortness of Breath    Subjective     Interval History:   Shortness of breath improved. He notes back to baseline. Notes he feels much better today. Blood pressure continues high. Renal artery ultrasound pending.     Review of Systems:     Review of Systems   Constitutional:  Positive for fatigue and unexpected weight change.   Respiratory:  Negative for shortness of breath.      Objective     Vital Sign Min/Max for last 24 hours  Temp  Min: 97.5 °F (36.4 °C)  Max: 98.2 °F (36.8 °C)   BP  Min: 157/60  Max: 214/60   Pulse  Min: 55  Max: 66   Resp  Min: 16  Max: 18   SpO2  Min: 91 %  Max: 98 %   No data recorded   Weight  Min: 61.1 kg (134 lb 9.6 oz)  Max: 61.1 kg (134 lb 9.6 oz)         01/14/25  0455   Weight: 61.1 kg (134 lb 9.6 oz)       Telemetry: Sinus Bradycardia 57-72      Physical Exam:    Constitutional:       Appearance: Underweight. Frail. Chronically ill-appearing.      Interventions: Face mask in place.      Comments: frail   Eyes:      Pupils: Pupils are equal, round, and reactive to light.   HENT:      Head: Normocephalic and atraumatic.    Mouth/Throat:      Pharynx: Oropharynx is clear.   Neck:      Vascular: No carotid bruit or JVD.   Pulmonary:      Effort: Pulmonary effort is normal.      Breath  sounds: Decreased breath sounds present.   Cardiovascular:      Normal rate. Regular rhythm.      Murmurs: There is no murmur.   Pulses:     Intact distal pulses.   Edema:     Peripheral edema absent.   Abdominal:      General: Bowel sounds are normal.      Palpations: Abdomen is soft.   Musculoskeletal: Normal range of motion.      Cervical back: Normal range of motion and neck supple. Skin:     General: Skin is warm and dry.   Neurological:      Mental Status: Alert, oriented to person, place, and time and oriented to person, place and time.      Deep Tendon Reflexes: Reflexes are normal and symmetric.   Psychiatric:         Behavior: Behavior normal.         Thought Content: Thought content normal.         Judgment: Judgment normal.       Results Review:   Lab Results (last 24 hours)       Procedure Component Value Units Date/Time    Cortisol [[id number]] Collected: 01/14/25 0419    Specimen: Blood Updated: 01/14/25 1144     Cortisol 12.80 mcg/dL     Narrative:      Cortisol Reference Ranges:    Cortisol 6AM - 10AM Range: 6.02-18.40 mcg/dl  Cortisol 4PM - 8PM Range: 2.68-10.50 mcg/dl      Results may be falsely increased if patient taking Biotin.      Comprehensive Metabolic Panel [[id number]]  (Abnormal) Collected: 01/14/25 0419    Specimen: Blood Updated: 01/14/25 0526     Glucose 108 mg/dL      BUN 77 mg/dL      Creatinine 4.00 mg/dL      Sodium 140 mmol/L      Potassium 4.1 mmol/L      Chloride 104 mmol/L      CO2 22.0 mmol/L      Calcium 8.6 mg/dL      Total Protein 5.7 g/dL      Albumin 3.3 g/dL      ALT (SGPT) 9 U/L      AST (SGOT) 16 U/L      Alkaline Phosphatase 140 U/L      Total Bilirubin 0.4 mg/dL      Globulin 2.4 gm/dL      A/G Ratio 1.4 g/dL      BUN/Creatinine Ratio 19.3     Anion Gap 14.0 mmol/L      eGFR 14.8 mL/min/1.73     Narrative:      GFR Categories in Chronic Kidney Disease (CKD)      GFR Category          GFR (mL/min/1.73)    Interpretation  G1                     90 or greater          Normal or high (1)  G2                      60-89                Mild decrease (1)  G3a                   45-59                Mild to moderate decrease  G3b                   30-44                Moderate to severe decrease  G4                    15-29                Severe decrease  G5                    14 or less           Kidney failure          (1)In the absence of evidence of kidney disease, neither GFR category G1 or G2 fulfill the criteria for CKD.    eGFR calculation 2021 CKD-EPI creatinine equation, which does not include race as a factor    CBC & Differential [[id number]]  (Abnormal) Collected: 01/14/25 0419    Specimen: Blood Updated: 01/14/25 0526    Narrative:      The following orders were created for panel order CBC & Differential.  Procedure                               Abnormality         Status                     ---------                               -----------         ------                     CBC Auto Differential[[id number]]        Abnormal            Final result                 Please view results for these tests on the individual orders.    CBC Auto Differential [[id number]]  (Abnormal) Collected: 01/14/25 0419    Specimen: Blood Updated: 01/14/25 0526     WBC 4.97 10*3/mm3      RBC 2.75 10*6/mm3      Hemoglobin 8.8 g/dL      Hematocrit 28.5 %      .6 fL      MCH 32.0 pg      MCHC 30.9 g/dL      RDW 15.9 %      RDW-SD 60.1 fl      MPV 9.8 fL      Platelets 83 10*3/mm3      Neutrophil % 66.4 %      Lymphocyte % 16.1 %      Monocyte % 11.1 %      Eosinophil % 4.6 %      Basophil % 1.2 %      Immature Grans % 0.6 %      Neutrophils, Absolute 3.30 10*3/mm3      Lymphocytes, Absolute 0.80 10*3/mm3      Monocytes, Absolute 0.55 10*3/mm3      Eosinophils, Absolute 0.23 10*3/mm3      Basophils, Absolute 0.06 10*3/mm3      Immature Grans, Absolute 0.03 10*3/mm3      nRBC 0.0 /100 WBC     PTH, Intact [[id number]]  (Abnormal) Collected: 01/13/25 1754    Specimen: Blood Updated: 01/13/25 1431      PTH, Intact 263.3 pg/mL     Narrative:      Results may be falsely decreased if patient taking Biotin.      Renin Direct Assay [[id number]] Collected: 01/13/25 1754    Specimen: Blood Updated: 01/13/25 1836    Aldosterone [[id number]] Collected: 01/13/25 1754    Specimen: Blood Updated: 01/13/25 1836    Blood Culture - Blood, Arm, Right [[id number]]  (Normal) Collected: 01/10/25 1508    Specimen: Blood from Arm, Right Updated: 01/13/25 1531     Blood Culture No growth at 3 days    Blood Culture - Blood, Arm, Right [[id number]]  (Normal) Collected: 01/10/25 1440    Specimen: Blood from Arm, Right Updated: 01/13/25 1500     Blood Culture No growth at 3 days    Aldosterone / Renin Ratio [[id number]] Collected: 01/13/25 1308    Specimen: Blood Updated: 01/13/25 1314    Metanephrines, Frac. Free, Plasma [[id number]] Collected: 01/13/25 1308    Specimen: Blood Updated: 01/13/25 1313                Results from last 7 days   Lab Units 01/14/25  0419 01/13/25  0439 01/12/25  0401   SODIUM mmol/L 140 140 139   POTASSIUM mmol/L 4.1 4.0 4.1   CHLORIDE mmol/L 104 106 108*   CO2 mmol/L 22.0 22.0 19.0*   BUN mg/dL 77* 74* 76*   CREATININE mg/dL 4.00* 3.55* 3.72*   GLUCOSE mg/dL 108* 100* 104*   CALCIUM mg/dL 8.6 8.4* 8.2*         Echo EF Estimated  Results for orders placed during the hospital encounter of 01/10/25    Adult Transthoracic Echo Complete w/ Color, Spectral and Contrast if Necessary Per Protocol    Interpretation Summary    Left ventricular systolic function is normal. Left ventricular ejection fraction appears to be 56 - 60%.    Left ventricular wall thickness is consistent with mild septal asymmetric hypertrophy.    Left ventricular diastolic function is consistent with (grade II w/high LAP) pseudonormalization.    Normal right ventricular cavity size and systolic function noted.    The left atrial cavity is mild to moderately dilated.    The right atrial cavity is dilated.    Mild aortic valve stenosis is present.     Mild to moderate mitral valve regurgitation is present.    Moderate to severe tricuspid valve regurgitation is present.    Estimated right ventricular systolic pressure from tricuspid regurgitation is markedly elevated (>55 mmHg).       Medication Review: yes  Current Facility-Administered Medications   Medication Dose Route Frequency Provider Last Rate Last Admin    acetaminophen (TYLENOL) tablet 650 mg  650 mg Oral Q4H PRN Katherine Camarena APRN        Or    acetaminophen (TYLENOL) 160 MG/5ML oral solution 650 mg  650 mg Oral Q4H PRN Katherine Camarena APRN        Or    acetaminophen (TYLENOL) suppository 650 mg  650 mg Rectal Q4H PRN Katherine Camarena, APRN        aspirin EC tablet 81 mg  81 mg Oral Daily Katherine Camarena APRN   81 mg at 01/14/25 0905    atorvastatin (LIPITOR) tablet 10 mg  10 mg Oral Daily Katherine Camarena APRN   10 mg at 01/14/25 0905    sennosides-docusate (PERICOLACE) 8.6-50 MG per tablet 2 tablet  2 tablet Oral BID PRN Katherine Camarena APRN        And    polyethylene glycol (MIRALAX) packet 17 g  17 g Oral Daily PRN Katherine Camarena APRN        And    bisacodyl (DULCOLAX) EC tablet 5 mg  5 mg Oral Daily PRN Katherine Camarena APRN        And    bisacodyl (DULCOLAX) suppository 10 mg  10 mg Rectal Daily PRN Katherine Camarena, APRN        calcitriol (ROCALTROL) capsule 0.5 mcg  0.5 mcg Oral Daily Giuliano Saldana MD   0.5 mcg at 01/14/25 0904    cetirizine (zyrTEC) tablet 10 mg  10 mg Oral Daily Katherine Camarena APRN   10 mg at 01/14/25 0905    cholestyramine (QUESTRAN) packet 1 packet  1 packet Oral Daily Katherine Camarena APRN   1 packet at 01/14/25 0903    cloNIDine (CATAPRES) tablet 0.3 mg  0.3 mg Oral TID Vidal Miramontes MD   0.3 mg at 01/14/25 0905    clopidogrel (PLAVIX) tablet 75 mg  75 mg Oral Daily Katherine Camarena APRN   75 mg at 01/14/25 0904    docusate sodium (COLACE) capsule 100 mg  100 mg Oral TID Katherine Camarena, APRN   100 mg at 01/14/25 0904    empagliflozin  (JARDIANCE) tablet 10 mg  10 mg Oral Daily Morales Schrader MD   10 mg at 01/14/25 0905    epoetin cecile-epbx (RETACRIT) injection 10,000 Units  10,000 Units Subcutaneous Once per day on Monday Wednesday Friday Giuliano Saldana MD   10,000 Units at 01/13/25 1643    fluticasone (FLONASE) 50 MCG/ACT nasal spray 2 spray  2 spray Each Nare Daily Katherine Camarena APRN   2 spray at 01/12/25 0818    [START ON 1/15/2025] furosemide (LASIX) tablet 40 mg  40 mg Oral Daily Jagdeep Moore APRN        hydrALAZINE (APRESOLINE) injection 10 mg  10 mg Intravenous Q4H PRN Vidal Miramontes MD   10 mg at 01/14/25 1236    hydrALAZINE (APRESOLINE) tablet 100 mg  100 mg Oral TID Katherine Camarena APRN   100 mg at 01/14/25 0904    levothyroxine (SYNTHROID, LEVOTHROID) tablet 75 mcg  75 mcg Oral Q AM Katherine Camarena APRN   75 mcg at 01/14/25 0606    melatonin tablet 10 mg  10 mg Oral Nightly Katherine Camarena APRN   10 mg at 01/13/25 2131    mesalamine (APRISO) 24 hr capsule 1.5 g  1.5 g Oral Daily Katherine Camarena APRN   1.5 g at 01/14/25 0903    montelukast (SINGULAIR) tablet 10 mg  10 mg Oral Nightly Katherine Camarena APRN   10 mg at 01/13/25 2131    NIFEdipine XL (PROCARDIA XL) 24 hr tablet 120 mg  120 mg Oral Daily Morales Schrader MD   120 mg at 01/14/25 0903    nitroglycerin (NITROSTAT) SL tablet 0.4 mg  0.4 mg Sublingual Q5 Min PRN Katherine Camarena APRN        pantoprazole (PROTONIX) EC tablet 40 mg  40 mg Oral Q AM Katherine Camarena APRN   40 mg at 01/14/25 0606    promethazine (PHENERGAN) tablet 12.5 mg  12.5 mg Oral Q6H PRN Camarena, Katherine D, APRN        sodium bicarbonate tablet 1,300 mg  1,300 mg Oral Q12H Katherine Camarena APRN   1,300 mg at 01/14/25 0905    sodium bicarbonate tablet 650 mg  650 mg Oral Daily With Lunch Katherine Camarena APRN   650 mg at 01/14/25 1236    sodium chloride 0.9 % flush 10 mL  10 mL Intravenous Q12H Katherine Camarena APRN   10 mL at 01/14/25 0913    sodium chloride 0.9 % flush 10 mL   10 mL Intravenous PRN Katherine Camarena APRN        sodium chloride 0.9 % infusion 40 mL  40 mL Intravenous PRN Katherine Camarena, APRN        spironolactone (ALDACTONE) tablet 25 mg  25 mg Oral Daily Jagdeep Moore APRN   25 mg at 01/14/25 0905    tamsulosin (FLOMAX) 24 hr capsule 0.4 mg  0.4 mg Oral Nightly Katherine Camarena APRN   0.4 mg at 01/13/25 2131    valsartan (DIOVAN) tablet 320 mg  320 mg Oral Daily Katherine Camarena APRN   320 mg at 01/14/25 0903         Assessment & Plan       Acute on chronic diastolic congestive heart failure    Essential hypertension    CKD (chronic kidney disease) stage 4, GFR 15-29 ml/min    Acute respiratory failure with hypoxia    Bradycardia    History of pneumonia, current treatment with cefdinir    Abnormal TSH    Plan:  Acute on chronic diastolic congestive heart failure- appears euvolemic on exam today. Acute Kidney Injury. Stop IV Lasix- switch to PO Lasix tomorrow. Continue Aldactone, Jardiance and Valsartan for now- appreciate nephrology input. Low Salt Diet. Daily weights. Patient appears frail and has lost over 20 pounds in last 6 months. Consider Entresto in future if renal function will allow.      Hypertension- blood pressure continues to run high. On Valsartan 320 daily, Nifedipine 120 daily, Hydrazine 100 mg TID, Clonidine 0.3 mg TID and Clonidine patch- these are all home doses.     Chronic Kidney Disease- AMAYA on chronic kidney disease. Appreciate Nephrology input. HOld IV diuretics.     Bilateral Pleural Effusions- breathing has improved. Decreased bases.     Bradycardia- Coreg held on admission. Bradycardia stable.     Electronically signed by STERLING Finnegan, 01/14/25, 1:08 PM CST.

## 2025-01-14 NOTE — PLAN OF CARE
Problem: Adult Inpatient Plan of Care  Goal: Plan of Care Review  Outcome: Progressing  Goal: Patient-Specific Goal (Individualized)  Outcome: Progressing  Goal: Absence of Hospital-Acquired Illness or Injury  Outcome: Progressing  Intervention: Identify and Manage Fall Risk  Recent Flowsheet Documentation  Taken 1/13/2025 1802 by Yesenia Orellana RN  Safety Promotion/Fall Prevention: safety round/check completed  Taken 1/13/2025 1700 by Yesenia Orellana RN  Safety Promotion/Fall Prevention: safety round/check completed  Taken 1/13/2025 1600 by Yesenia Orellana RN  Safety Promotion/Fall Prevention: safety round/check completed  Taken 1/13/2025 1500 by Yesenia Orellana RN  Safety Promotion/Fall Prevention: safety round/check completed  Taken 1/13/2025 1400 by Yesenia Orellana RN  Safety Promotion/Fall Prevention: safety round/check completed  Taken 1/13/2025 1300 by Yesenia Orellana RN  Safety Promotion/Fall Prevention: safety round/check completed  Taken 1/13/2025 1200 by Yesenia Orellana RN  Safety Promotion/Fall Prevention: safety round/check completed  Taken 1/13/2025 1100 by Yesenia Orellana RN  Safety Promotion/Fall Prevention: safety round/check completed  Taken 1/13/2025 1000 by Yesenia Orellana RN  Safety Promotion/Fall Prevention: safety round/check completed  Taken 1/13/2025 0900 by Yesenia Orellana RN  Safety Promotion/Fall Prevention: safety round/check completed  Taken 1/13/2025 0800 by Yesenia Orellana RN  Safety Promotion/Fall Prevention: safety round/check completed  Taken 1/13/2025 0700 by Yesenia Orellana RN  Safety Promotion/Fall Prevention: safety round/check completed  Intervention: Prevent Skin Injury  Recent Flowsheet Documentation  Taken 1/13/2025 0800 by Yesenia Orellana RN  Body Position: position changed independently  Intervention: Prevent and Manage VTE (Venous Thromboembolism) Risk  Recent Flowsheet Documentation  Taken 1/13/2025 0800 by Yesenia Orellana RN  VTE Prevention/Management:   SCDs (sequential compression devices)  off   patient refused intervention  Intervention: Prevent Infection  Recent Flowsheet Documentation  Taken 1/13/2025 0800 by Yesenia Orellana RN  Infection Prevention:   rest/sleep promoted   single patient room provided  Goal: Optimal Comfort and Wellbeing  Outcome: Progressing  Intervention: Provide Person-Centered Care  Recent Flowsheet Documentation  Taken 1/13/2025 0800 by Yesenia Orellana RN  Trust Relationship/Rapport: care explained  Goal: Readiness for Transition of Care  Outcome: Progressing     Problem: Heart Failure  Goal: Optimal Coping  Outcome: Progressing  Goal: Optimal Cardiac Output and Blood Flow  Outcome: Progressing  Goal: Stable Heart Rate and Rhythm  Outcome: Progressing  Goal: Fluid and Electrolyte Balance  Outcome: Progressing  Goal: Optimal Functional Ability  Outcome: Progressing  Intervention: Optimize Functional Ability  Recent Flowsheet Documentation  Taken 1/13/2025 0800 by Yesenia Orellana RN  Activity Management: activity encouraged  Goal: Improved Oral Intake  Outcome: Progressing  Goal: Effective Oxygenation and Ventilation  Outcome: Progressing  Intervention: Promote Airway Secretion Clearance  Recent Flowsheet Documentation  Taken 1/13/2025 0800 by Yesenia Orellana RN  Activity Management: activity encouraged  Cough And Deep Breathing: done independently per patient  Intervention: Optimize Oxygenation and Ventilation  Recent Flowsheet Documentation  Taken 1/13/2025 0800 by Yesenia Orellana RN  Head of Bed (HOB) Positioning: HOB at 30-45 degrees  Goal: Effective Breathing Pattern During Sleep  Outcome: Progressing  Intervention: Monitor and Manage Obstructive Sleep Apnea  Recent Flowsheet Documentation  Taken 1/13/2025 0800 by Yesenia Orellana RN  Medication Review/Management: medications reviewed     Problem: Comorbidity Management  Goal: Maintenance of Heart Failure Symptom Control  Outcome: Progressing  Intervention: Maintain Heart Failure Management  Recent Flowsheet Documentation  Taken  1/13/2025 0800 by Yesenia Orellana RN  Medication Review/Management: medications reviewed  Goal: Blood Pressure in Desired Range  Outcome: Progressing  Intervention: Maintain Blood Pressure Management  Recent Flowsheet Documentation  Taken 1/13/2025 0800 by Yesenia Orellana RN  Medication Review/Management: medications reviewed     Problem: Fall Injury Risk  Goal: Absence of Fall and Fall-Related Injury  Outcome: Progressing  Intervention: Identify and Manage Contributors  Recent Flowsheet Documentation  Taken 1/13/2025 0800 by Yesenia Orellana RN  Medication Review/Management: medications reviewed  Intervention: Promote Injury-Free Environment  Recent Flowsheet Documentation  Taken 1/13/2025 1802 by Yesenia Orellana RN  Safety Promotion/Fall Prevention: safety round/check completed  Taken 1/13/2025 1700 by Yesenia Orellana RN  Safety Promotion/Fall Prevention: safety round/check completed  Taken 1/13/2025 1600 by Yesneia Orellana RN  Safety Promotion/Fall Prevention: safety round/check completed  Taken 1/13/2025 1500 by Yesenia Orellana RN  Safety Promotion/Fall Prevention: safety round/check completed  Taken 1/13/2025 1400 by Yesenia Orellana RN  Safety Promotion/Fall Prevention: safety round/check completed  Taken 1/13/2025 1300 by Yesenia Orellana RN  Safety Promotion/Fall Prevention: safety round/check completed  Taken 1/13/2025 1200 by Yesenia Orellana RN  Safety Promotion/Fall Prevention: safety round/check completed  Taken 1/13/2025 1100 by Yesenia Orellana RN  Safety Promotion/Fall Prevention: safety round/check completed  Taken 1/13/2025 1000 by Yesenia Orellana RN  Safety Promotion/Fall Prevention: safety round/check completed  Taken 1/13/2025 0900 by Yesenia Orellana RN  Safety Promotion/Fall Prevention: safety round/check completed  Taken 1/13/2025 0800 by Yesenia Orellana RN  Safety Promotion/Fall Prevention: safety round/check completed  Taken 1/13/2025 0700 by Yesenia Orellana RN  Safety Promotion/Fall Prevention: safety round/check completed    Goal Outcome Evaluation:

## 2025-01-14 NOTE — PROGRESS NOTES
.[facility] Medicine Services  INPATIENT PROGRESS NOTE    Patient Name: Ricardo Hugo  Date of Admission: 1/10/2025  Today's Date: 01/14/25  Length of Stay: 4  Primary Care Physician: Nathan Zimmer MD    Subjective   Chief Complaint: Follow-up  HPI   Patient admitted on January 10  This is my first encounter with patient  Patient since admission has been seen by nephrology and cardiology  Cardiology service recommends workup for secondary causes of hypertension.  Nephrology on the other hand indicates renal function at baseline level and recommends continue titration of antihypertensive medication.    Patient presented with worsening shortness of breath and carries history of diastolic heart failure, coronary artery disease with prior coronary artery bypass graft, Crohn's disease, chronic kidney disease stage IV, hypertension, anemia of chronic disease.  Patient was described to be hypoxic in the mid 80s  He had recent antibiotic treatment for pneumonia (Augmentin with azithromycin then Omnicef)    Patient had blood pressure reading as high as 203/57 earlier.  Current antihypertensive medications include:     clonidine 0.3 mg 3 times daily  Hydralazine 100 mg 3 times daily  Lasix 40 mg IV twice daily  Nifedipine  mg daily  Valsartan 320 mg daily  Spironolactone 25 mg daily.      Patient has no new complaints  No headaches  No chest pain  No shortness of breath or difficulty breathing  Patient states that it is normal for him to have 200 blood pressure systolic.  He asked whether he is still on clonidine and I confirmed this based on above list of medications.  He said it will drop his blood pressure about 50 points.  He does not have any dizziness or lightheadedness even with this 50 points drop.      January 14.  Workup for secondary causes of hypertension in progress.    Blood pressure this morning is similar and in general improved compared  yesterday.        He has right greater than left pleural effusion on CT imaging of the chest and has a near complete collapse atelectasis of the right lower lobe and partial collapse atelectasis of right middle lobe.  There is described patchy consolidation of left lower lobe likely due to atelectasis.  There is mediastinal lymphadenopathy which could be reactive or neoplastic.  There is cardiomegaly and atheromatous disease of the thoracic and coronary arteries.  Dilated ascending thoracic aortic to measuring 4 cm also has dilated pulmonary artery measuring 3.7 cm suggesting pulmonary hypertension.  Small amount of ascites around liver and spleen with body wall edema.  Vascular necrosis of right humeral head  As per ultrasound of kidneys it also mentioned a cirrhotic morphology with trace ascites of the liver.  I revisited patient's past medical history from admitting history and has several listed history almost a page full in this computer screen.      He has no new complaints.  He has no dizziness or lightheadedness  He has no chest pain or shortness of breath.  He was just ruled in.  He said he went for vascular study.    Review of Systems   All pertinent negatives and positives are as above. All other systems have been reviewed and are negative unless otherwise stated.     Objective    Temp:  [98 °F (36.7 °C)-98.2 °F (36.8 °C)] 98.2 °F (36.8 °C)  Heart Rate:  [54-66] 55  Resp:  [16] 16  BP: (157-188)/(46-60) 157/60  Physical Exam  Pleasant man  No distress  He is maintaining adequate oxygenation in room air.    No distress  No thyromegaly  No gross obvious signs of fluid retention  Diminished breath sound with adequate air exchange and no obvious adventitious sound  Positive murmur  Regular rate and rhythm from what I heard it.  Soft flat abdomen  Warm dry skin with good capillary refill  Appropriate affect  No gross focal neurologic deficit.        Results Review:  I have reviewed the labs, radiology results,  and diagnostic studies.    Laboratory Data:   Results from last 7 days   Lab Units 01/14/25 0419 01/13/25  0439 01/12/25  0401   WBC 10*3/mm3 4.97 4.44 5.58   HEMOGLOBIN g/dL 8.8* 9.0* 8.8*   HEMATOCRIT % 28.5* 28.7* 28.6*   PLATELETS 10*3/mm3 83* 78* 94*        Results from last 7 days   Lab Units 01/14/25 0419 01/13/25 0439 01/12/25  0401   SODIUM mmol/L 140 140 139   POTASSIUM mmol/L 4.1 4.0 4.1   CHLORIDE mmol/L 104 106 108*   CO2 mmol/L 22.0 22.0 19.0*   BUN mg/dL 77* 74* 76*   CREATININE mg/dL 4.00* 3.55* 3.72*   CALCIUM mg/dL 8.6 8.4* 8.2*   BILIRUBIN mg/dL 0.4 0.3 0.3   ALK PHOS U/L 140* 124* 133*   ALT (SGPT) U/L 9 9 8   AST (SGOT) U/L 16 16 17   GLUCOSE mg/dL 108* 100* 104*       Culture Data:   Blood Culture   Date Value Ref Range Status   01/10/2025 No growth at 3 days  Preliminary   01/10/2025 No growth at 3 days  Preliminary       Radiology Data:   Imaging Results (Last 24 Hours)       ** No results found for the last 24 hours. **            I have reviewed the patient's current medications.     Assessment/Plan   Assessment  Active Hospital Problems    Diagnosis     **Acute on chronic diastolic congestive heart failure     Acute respiratory failure with hypoxia     Bradycardia     History of pneumonia, current treatment with cefdinir     CKD (chronic kidney disease) stage 4, GFR 15-29 ml/min     Essential hypertension          Medical Decision Making  Number and Complexity of problems:   Resistant hypertension  CKD stage IV-stable creatinine  Acute on chronic diastolic CHF  Metabolic acidosis  Anemia of chronic kidney disease-on EPOetin; secondary hyperparathyroidism (on calcitriol)  Chronic thrombocytopenia-cirrhotic morphology on imaging study.  History of alcoholism  Mildly elevated TSH-repeat testing in outpatient setting in about 3 to 4 weeks.  Typically TSH greater than 10 is when medication should be started from recent review of literature bilateral pleural effusion right greater than  left  Bradycardia-noted that carvedilol has been on hold.  Abnormal CT of the main pulmonary artery concerning for pulmonary hypertension  Dilated ascending thoracic aorta measuring 4 cm-will need follow-up in outpatient (surveillance); optimize blood pressure control  Presence of mediastinal lymphadenopathy-reactive or neoplastic process considered.  Will need to continue to monitor/follow-up.    Treatment Plan  Reviewed literature regarding resistant hypertension:  This is defined as  a blood pressure that remains above goal despite concurrent use of three antihypertensive agents of different classes taken at maximally tolerated doses and at appropriate dosing frequency, one of which should be a diuretic     Current antihypertensive medications include:    clonidine 0.3 mg 3 times daily  Hydralazine 100 mg 3 times daily  Lasix 40 mg IV twice daily  Nifedipine  mg daily  Valsartan 320 mg daily  Spironolactone 25 mg daily.            Calcium is 8.4, will check PTH  Potassium is normal at 4, sodium 140 (I would expect elevated sodium and low potassium and hyperaldosteronism) creatinine 3.5  TSH is 8.5  Will check plasma aldosterone plasma renin activity.  Will discuss with nephrology  plasma or 24-hour urine metanephrines (I noticed the last part was already ordered.)      January 14.    I have not seen the serum metanephrine result either as of yet    Monitor blood pressure on multiple medications    Monitor renal function and fluid balance.    Appreciate cardiology and nephrology input    aspirin, 81 mg, Oral, Daily  atorvastatin, 10 mg, Oral, Daily  calcitriol, 0.5 mcg, Oral, Daily  cetirizine, 10 mg, Oral, Daily  cholestyramine, 1 packet, Oral, Daily  cloNIDine, 0.3 mg, Oral, TID  clopidogrel, 75 mg, Oral, Daily  docusate sodium, 100 mg, Oral, TID  empagliflozin, 10 mg, Oral, Daily  epoetin cecile/cecile-epbx, 10,000 Units, Subcutaneous, Once per day on Monday Wednesday Friday  fluticasone, 2 spray, Each Nare,  Daily  furosemide, 40 mg, Intravenous, BID Diuretics  hydrALAZINE, 100 mg, Oral, TID  levothyroxine, 75 mcg, Oral, Q AM  melatonin, 10 mg, Oral, Nightly  mesalamine, 1.5 g, Oral, Daily  montelukast, 10 mg, Oral, Nightly  NIFEdipine XL, 120 mg, Oral, Daily  pantoprazole, 40 mg, Oral, Q AM  sodium bicarbonate, 1,300 mg, Oral, Q12H  sodium bicarbonate, 650 mg, Oral, Daily With Lunch  sodium chloride, 10 mL, Intravenous, Q12H  spironolactone, 25 mg, Oral, Daily  tamsulosin, 0.4 mg, Oral, Nightly  valsartan, 320 mg, Oral, Daily            Conditions and Status     Fair     MDM Data  External documents reviewed: Epic record  Cardiac tracing (EKG, telemetry) interpretation: -  Radiology interpretation: Renal ultrasound reviewed.  Medical renal disease with no hydronephrosis; reported cirrhotic liver morphology with trace ascites and bilateral effusion.    Labs reviewed: Yes  Any tests that were considered but not ordered: None     Decision rules/scores evaluated (example [id number], [place], etc): -     Discussed with: Patient     Care Planning  Shared decision making: Patient and consultants  Code status and discussions: Full code    Disposition  Social Determinants of Health that impact treatment or disposition: None identified at this time  I expect the patient to be discharged to (TBD)          Electronically signed by Jamie Pineda MD, 01/14/25, 07:47 CST.

## 2025-01-14 NOTE — PROGRESS NOTES
Nephrology ([facility]) Progress Note      Patient:  Ricardo Hugo  YOB: 1950  Date of Service: 1/14/2025  MRN: [id number]   Acct: [id number]   Primary Care Physician: Nathan Zimmer MD  Advance Directive:   Code Status and Medical Interventions: CPR (Attempt to Resuscitate); Full Support   Ordered at: 01/10/25 1631     Code Status (Patient has no pulse and is not breathing):    CPR (Attempt to Resuscitate)     Medical Interventions (Patient has pulse or is breathing):    Full Support     Admit Date: 1/10/2025       Hospital Day: 4  Referring Provider: Vidal Miramontes MD      Patient personally seen and examined.  Complete chart including Consults, Notes, Operative Reports, Labs, Cardiology, and Radiology studies reviewed as able.    Chief complaint: Abnormal labs.    Subjective:  Ricardo Hugo is a 74 y.o. male for whom we were consulted for evaluation and treatment of chronic kidney disease stage 4. Baseline GFR 17, follows in our office. History of difficult to control hypertension, Crohn's disease, diastolic CHF. Presented to ER with increased dyspnea. Admitted for CHF exacerbation. Initial labs showed creatinine 3.4 which is essentially his baseline level. Denied edema or weight gain. Denied changes in urination.  Hospital course markable for treatment with intravenous diuretics leading to worsening of renal function.    This morning he feels well, denies any shortness of breath however he has significant decline in GFR and rising serum creatinine.    Allergies:  Ondansetron, Zofran [ondansetron hcl], Lortab [hydrocodone-acetaminophen], and Allopurinol    Home Meds:  Facility-Administered Medications Prior to Admission   Medication Dose Route Frequency Provider Last Rate Last Admin    cyanocobalamin injection 1,000 mcg  1,000 mcg Intramuscular Q28 Days Fidel, Karnes C, APRN   1,000 mcg at 08/11/23 1123     Medications Prior to Admission   Medication Sig  Dispense Refill Last Dose/Taking    albuterol sulfate  (90 Base) MCG/ACT inhaler Inhale 2 puffs 4 (Four) Times a Day As Needed for Wheezing or Shortness of Air.   Past Week    ALPRAZolam (XANAX) 0.25 MG tablet Take 1 tablet by mouth Every Night.   Past Week    aspirin (aspirin) 81 MG EC tablet Take 1 tablet by mouth Daily.   Past Week    atorvastatin (LIPITOR) 10 MG tablet Take 1 tablet by mouth Daily. 90 tablet 3 Past Week    Budeson-Glycopyrrol-Formoterol (Breztri Aerosphere) 160-9-4.8 MCG/ACT aerosol inhaler Inhale 2 puffs 2 (Two) Times a Day. 1 each 3 Past Week    carvedilol (COREG) 25 MG tablet Take 1 tablet by mouth 2 (Two) Times a Day With Meals. 180 tablet 3 Past Week    cefdinir (OMNICEF) 300 MG capsule Take 1 capsule by mouth Daily. 5 capsule 0 Past Week    cholestyramine light 4 g packet Take 1 packet by mouth Daily.   Past Week    cloNIDine (CATAPRES) 0.3 MG tablet Take 1 tablet by mouth 3 (Three) Times a Day. 270 tablet 2 Past Week    cloNIDine (CATAPRES-TTS) 0.2 MG/24HR patch Place 1 patch on the skin as directed by provider 1 (One) Time Per Week. THURSDAYS   Past Week    clopidogrel (PLAVIX) 75 MG tablet Take 1 tablet by mouth Daily.   Past Week    Copper Gluconate (Copper Caps) 2 MG capsule Take 2 mg by mouth Daily.   Past Week    docusate sodium (COLACE) 100 MG capsule Take 1 capsule by mouth 3 (Three) Times a Day As Needed for Constipation.   Past Week    fexofenadine (ALLEGRA) 180 MG tablet Take 1 tablet by mouth Daily.   Past Week    fluticasone (FLONASE) 50 MCG/ACT nasal spray Administer 2 sprays into the nostril(s) as directed by provider Daily.   Past Week    furosemide (Lasix) 20 MG tablet Take 1 tablet by mouth 3 (Three) Times a Day. 270 tablet 3 Past Week    hydrALAZINE (APRESOLINE) 100 MG tablet Take 1 tablet by mouth 3 (Three) Times a Day. 100mg daily   Past Week    levothyroxine (Synthroid) 75 MCG tablet Take 1 tablet by mouth Daily. 90 tablet 3 Past Week    Magnesium Cl-Calcium  Carbonate (SLOW-MAG PO) Take 143 mg by mouth Daily.   Past Week    Melatonin 10 MG capsule Take 3 capsules by mouth Every Night.   Past Week    mesalamine (APRISO) 0.375 g 24 hr capsule Take 4 capsules by mouth Daily.   Past Week    montelukast (SINGULAIR) 10 MG tablet Take 1 tablet by mouth Every Night.   Past Week    Multiple Vitamins-Minerals (PRESERVISION/LUTEIN) capsule Take 1 capsule by mouth 2 (two) times a day.   Past Week    NIFEdipine XL (PROCARDIA XL) 90 MG 24 hr tablet Take 1 tablet by mouth Daily.   Past Week    omeprazole (priLOSEC) 20 MG capsule Take 1 capsule by mouth Daily.   Past Week    sodium bicarbonate 650 MG tablet Take 5 tablets by mouth Daily. 2 qam, 1 at noon, and 2 qpm   Past Week    spironolactone (ALDACTONE) 25 MG tablet Take 1 tablet by mouth Daily. 90 tablet 2 Past Week    tamsulosin (FLOMAX) 0.4 MG capsule 24 hr capsule Take 1 capsule by mouth Every Night. 90 capsule 3 Past Week    valsartan (DIOVAN) 320 MG tablet Take 1 tablet by mouth Daily.   Past Week    vitamin D (ERGOCALCIFEROL) 1.25 MG (52211 UT) capsule capsule Take 1 capsule by mouth 1 (One) Time Per Week.   Past Week       Medicines:  Current Facility-Administered Medications   Medication Dose Route Frequency Provider Last Rate Last Admin    acetaminophen (TYLENOL) tablet 650 mg  650 mg Oral Q4H PRN Katherine Camarena APRN        Or    acetaminophen (TYLENOL) 160 MG/5ML oral solution 650 mg  650 mg Oral Q4H PRN Katherine Camarena APRN        Or    acetaminophen (TYLENOL) suppository 650 mg  650 mg Rectal Q4H PRN Katherine Camarena APRSHERITA        aspirin EC tablet 81 mg  81 mg Oral Daily Katherine Camarena APRN   81 mg at 01/14/25 0905    atorvastatin (LIPITOR) tablet 10 mg  10 mg Oral Daily Katherine Camarena APRN   10 mg at 01/14/25 0905    sennosides-docusate (PERICOLACE) 8.6-50 MG per tablet 2 tablet  2 tablet Oral BID PRN Katherine Camarena APRN        And    polyethylene glycol (MIRALAX) packet 17 g  17 g Oral Daily PRN  Katherine Camarena APRN        And    bisacodyl (DULCOLAX) EC tablet 5 mg  5 mg Oral Daily PRN Katherine Camarena APRN        And    bisacodyl (DULCOLAX) suppository 10 mg  10 mg Rectal Daily PRN Katherine Camarena APRN        calcitriol (ROCALTROL) capsule 0.5 mcg  0.5 mcg Oral Daily Giuliano Saldana MD   0.5 mcg at 01/14/25 0904    cetirizine (zyrTEC) tablet 10 mg  10 mg Oral Daily Katherine Camarena APRN   10 mg at 01/14/25 0905    cholestyramine (QUESTRAN) packet 1 packet  1 packet Oral Daily Katherine Camarena APRN   1 packet at 01/14/25 0903    cloNIDine (CATAPRES) tablet 0.3 mg  0.3 mg Oral TID Vidal Miramontes MD   0.3 mg at 01/14/25 0905    clopidogrel (PLAVIX) tablet 75 mg  75 mg Oral Daily Katherine Camarena APRN   75 mg at 01/14/25 0904    docusate sodium (COLACE) capsule 100 mg  100 mg Oral TID Katherine Camarena APRN   100 mg at 01/14/25 0904    empagliflozin (JARDIANCE) tablet 10 mg  10 mg Oral Daily Morales Schrader MD   10 mg at 01/14/25 0905    epoetin cecile-epbx (RETACRIT) injection 10,000 Units  10,000 Units Subcutaneous Once per day on Monday Wednesday Friday Giuliano Saldana MD   10,000 Units at 01/13/25 1643    fluticasone (FLONASE) 50 MCG/ACT nasal spray 2 spray  2 spray Each Nare Daily Katherine Camarena APRN   2 spray at 01/12/25 0818    [START ON 1/15/2025] furosemide (LASIX) tablet 40 mg  40 mg Oral Daily Jagdeep Moore APRN        hydrALAZINE (APRESOLINE) injection 10 mg  10 mg Intravenous Q4H PRN Vidal Miramontes MD   10 mg at 01/14/25 1236    hydrALAZINE (APRESOLINE) tablet 100 mg  100 mg Oral TID Katherine Camarena APRN   100 mg at 01/14/25 0904    levothyroxine (SYNTHROID, LEVOTHROID) tablet 75 mcg  75 mcg Oral Q AM Katherine Camarena APRN   75 mcg at 01/14/25 0606    melatonin tablet 10 mg  10 mg Oral Nightly Katherine Camarena APRN   10 mg at 01/13/25 2131    mesalamine (APRISO) 24 hr capsule 1.5 g  1.5 g Oral Daily Katherine Camarena APRN   1.5 g at 01/14/25 0903    [facility]  (SINGULAIR) tablet 10 mg  10 mg Oral Nightly Katherine Camraena, APRN   10 mg at 01/13/25 2131    NIFEdipine XL (PROCARDIA XL) 24 hr tablet 120 mg  120 mg Oral Daily Morales Schrader MD   120 mg at 01/14/25 0903    nitroglycerin (NITROSTAT) SL tablet 0.4 mg  0.4 mg Sublingual Q5 Min PRN Katherine Camarena, APRSHERITA        pantoprazole (PROTONIX) EC tablet 40 mg  40 mg Oral Q AM Katherine Camarena APRN   40 mg at 01/14/25 0606    promethazine (PHENERGAN) tablet 12.5 mg  12.5 mg Oral Q6H PRN Katherine Camarena, STERLING        sodium bicarbonate tablet 1,300 mg  1,300 mg Oral Q12H Katherine Camarena APRN   1,300 mg at 01/14/25 0905    sodium bicarbonate tablet 650 mg  650 mg Oral Daily With Lunch Katherine Camarena, APRN   650 mg at 01/14/25 1236    sodium chloride 0.9 % flush 10 mL  10 mL Intravenous Q12H Katherine Camarena APRN   10 mL at 01/14/25 0913    sodium chloride 0.9 % flush 10 mL  10 mL Intravenous PRN Katherine Camarena APRN        sodium chloride 0.9 % infusion 40 mL  40 mL Intravenous PRN Katherine Camarena APRN        spironolactone (ALDACTONE) tablet 25 mg  25 mg Oral Daily Jagdeep Moore APRN   25 mg at 01/14/25 0905    tamsulosin (FLOMAX) 24 hr capsule 0.4 mg  0.4 mg Oral Nightly Katherine Camarena APRN   0.4 mg at 01/13/25 2131    valsartan (DIOVAN) tablet 320 mg  320 mg Oral Daily Katherine Camarena APRN   320 mg at 01/14/25 0903       Past Medical History:  Past Medical History:   Diagnosis Date    3-vessel CAD 8/11/2020    Allergic rhinitis     Anxiety disorder 4/27/2020    Arthritis     Asymmetrical sensorineural hearing loss 6/28/2017    Atherosclerosis of native artery of both lower extremities with intermittent claudication 7/18/2019    Avascular necrosis of femoral head, left 07/11/2020    right hip after surgery    Carotid stenosis     Chronic mucoid otitis media     Chronic rhinitis     Coronary artery disease     HEART BYPASS 2004    Crohn's disease of large intestine with other complication  7/30/2020    Chronic diarrhea Colonoscopy July 2020 revealed mild patchy scattered hemosiderin staining with inflammation more so in rectosigmoid area.  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    Iron deficiency anemia 7/14/2020    Mixed hearing loss of left ear     PAD (peripheral artery disease) 8/11/2020    Perianal abscess     Pernicious anemia 08/17/2020    took shots but never diagnosed with b12 deficiency    Personal history of alcoholism 08/11/2020    quit drinking in 2013    Prostatic hypertrophy 8/11/2020    Sensorineural hearing loss     Sepsis with acute renal failure 9/15/2020    Shortness of breath 5/27/2021    Tinnitus     Ventricular tachycardia, nonsustained 7/14/2020    Weight loss 7/11/2020       Past Surgical History:  Past Surgical History:   Procedure Laterality Date    ARTERY SURGERY  2021    right side on neck    CAROTID ENDARTERECTOMY Right 5/10/2021    Procedure: RIGHT CAROTID ENDARTERECTOMY WITH EEG;  Surgeon: Gil Pineda DO;  Location: [facility] OR ;  Service: Vascular;  Laterality: Right;    COLONOSCOPY N/A 7/2/2020    Procedure: COLONOSCOPY WITH ANESTHESIA;  Surgeon: Adrien Brewster MD;  Location: [facility] ENDOSCOPY;  Service: Gastroenterology;  Laterality: N/A;  pre op: diarrhea  post op: polyps  PCP: Joe Velasco MD    COLONOSCOPY N/A 10/13/2020    Procedure: COLONOSCOPY WITH ANESTHESIA;  Surgeon: Adrien Brewster MD;  Location: [facility] ENDOSCOPY;  Service: Gastroenterology;  Laterality: N/A;  Pre: Chronic Diarrhea, Crohn's  Post: AVM  Dr. Neftali Velasco  CO2 Inflation Used    COLONOSCOPY N/A 12/8/2023    Procedure: COLONOSCOPY WITH  ANESTHESIA;  Surgeon: Adrien Brewster MD;  Location: [facility] ENDOSCOPY;  Service: Gastroenterology;  Laterality: N/A;  pre chrone's disease  post sub optimal prep, polyp, chrone's      CORONARY ARTERY BYPASS GRAFT  2003    x3    ENDOSCOPY N/A 2021    Procedure: ESOPHAGOGASTRODUODENOSCOPY WITH ANESTHESIA;  Surgeon: Bridger Bell MD;  Location: [facility] ENDOSCOPY;  Service: Gastroenterology;  Laterality: N/A;  pre anemia;gi bleed  post  gi bleed;schatski ring  Dr. ERIC Velasco    ENDOSCOPY N/A 10/10/2023    Procedure: ESOPHAGOGASTRODUODENOSCOPY WITH ANESTHESIA;  Surgeon: Adrien Brewster MD;  Location: [facility] ENDOSCOPY;  Service: Gastroenterology;  Laterality: N/A;  preop; anemia  postop esophagitis ; R/O barretts   PCP Randall Beata    EYE SURGERY Bilateral     catorac    INCISION AND DRAINAGE PERIRECTAL ABSCESS N/A 3/3/2017    Procedure: INCISION AND DRAINAGE OF JEET ANAL ABSCESS;  Surgeon: Lynette Smith MD;  Location: [facility] OR;  Service:     INGUINAL HERNIA REPAIR Bilateral 2023    Procedure: INGUINAL HERNIA BILATERAL REPAIR LAPAROSCOPIC WITH DAVINCI ROBOT WITH MESH;  Surgeon: aThira Rivera MD;  Location: [facility] OR;  Service: Robotics - DaVinci;  Laterality: Bilateral;    MYRINGOTOMY W/ TUBES Left 2017    06/10/2016    TONSILLECTOMY      TOTAL HIP ARTHROPLASTY Right        Family History  Family History   Problem Relation Age of Onset    Breast cancer Mother     Dementia Father     Glaucoma Father     No Known Problems Daughter     Colon polyps Neg Hx     Colon cancer Neg Hx        Social History  Social History     Socioeconomic History    Marital status:    Tobacco Use    Smoking status: Former     Current packs/day: 0.00     Average packs/day: 0.5 packs/day for 25.8 years (12.9 ttl pk-yrs)     Types: Cigarettes     Start date:      Quit date: 10/13/2013     Years since quittin.2     Passive exposure: Past    Smokeless tobacco: Never    Tobacco  comments:     quit 2013   Vaping Use    Vaping status: Never Used   Substance and Sexual Activity    Alcohol use: Not Currently    Drug use: No    Sexual activity: Not Currently     Partners: Female       Review of Systems:  History obtained from chart review and the patient  General ROS: No fever or chills  Respiratory ROS: No cough, shortness of breath, wheezing  Cardiovascular ROS: No chest pain or palpitations  Gastrointestinal ROS: No abdominal pain or melena  Genito-Urinary ROS: No dysuria or hematuria  Psych ROS: No anxiety and depression  14 point ROS reviewed with the patient and negative except as noted above and in the HPI unless unable to obtain.    Objective:  Patient Vitals for the past 24 hrs:   BP Temp Temp src Pulse Resp SpO2 Weight   01/14/25 1156 (!) 187/53 98.1 °F (36.7 °C) Oral 58 18 97 % --   01/14/25 0903 (!) 214/60 -- -- 60 -- -- --   01/14/25 0855 -- 97.5 °F (36.4 °C) Oral -- 18 94 % --   01/14/25 0455 -- -- -- -- -- -- 61.1 kg (134 lb 9.6 oz)   01/14/25 0347 157/60 98.2 °F (36.8 °C) Oral 55 16 96 % --   01/13/25 2343 157/49 -- -- 60 16 91 % --   01/13/25 2019 (!) 188/51 98 °F (36.7 °C) Oral 66 16 92 % --   01/13/25 1606 168/46 98.2 °F (36.8 °C) Oral 60 16 98 % --       Intake/Output Summary (Last 24 hours) at 1/14/2025 1358  Last data filed at 1/14/2025 1156  Gross per 24 hour   Intake 600 ml   Output 3100 ml   Net -2500 ml     General: awake/alert   HEENT: Normocephalic atraumatic head  Neck: Supple with no JVD or carotid bruits.  Chest:  clear to auscultation bilaterally without respiratory distress  CVS: regular rate and rhythm  Abdominal: soft, nontender, positive bowel sounds  Extremities: no cyanosis or edema  Skin: warm and dry without rash      Labs:  Results from last 7 days   Lab Units 01/14/25  0419 01/13/25  0439 01/12/25  0401   WBC 10*3/mm3 4.97 4.44 5.58   HEMOGLOBIN g/dL 8.8* 9.0* 8.8*   HEMATOCRIT % 28.5* 28.7* 28.6*   PLATELETS 10*3/mm3 83* 78* 94*         Results from  last 7 days   Lab Units 01/14/25  0419 01/13/25  0439 01/12/25  0401   SODIUM mmol/L 140 140 139   POTASSIUM mmol/L 4.1 4.0 4.1   CHLORIDE mmol/L 104 106 108*   CO2 mmol/L 22.0 22.0 19.0*   BUN mg/dL 77* 74* 76*   CREATININE mg/dL 4.00* 3.55* 3.72*   CALCIUM mg/dL 8.6 8.4* 8.2*   EGFR mL/min/1.73 14.8* 17.1* 16.1*   BILIRUBIN mg/dL 0.4 0.3 0.3   ALK PHOS U/L 140* 124* 133*   ALT (SGPT) U/L 9 9 8   AST (SGOT) U/L 16 16 17   GLUCOSE mg/dL 108* 100* 104*       Radiology:   Imaging Results (Last 72 Hours)       Procedure Component Value Units Date/Time    US Renal Bilateral [[id number]] Collected: 01/12/25 1641     Updated: 01/14/25 1340    Narrative:      US RENAL BILATERAL- 1/12/2025 12:40 PM     HISTORY: CKD; I50.31-Acute diastolic (congestive) heart failure;  N18.4-Chronic kidney disease, stage 4 (severe); I10-Essential (primary)  hypertension; R00.1-Bradycardia, unspecified; R05.8-Other specified  cough; R79.89-Other specified abnormal findings of blood chemistry     COMPARISON: 7/12/2021     TECHNIQUE: Multiple longitudinal and transverse realtime sonographic  images of the kidneys and urinary bladder are obtained. Images stored in  PACS [facility] institutional protocol.     FINDINGS:      Trace perihepatic ascites. Cirrhotic liver morphology. Bilateral pleural  effusions.     Right kidney measures 9.5 x 4.1 x 4.6 cm. 1.7 cm cyst without complex  features. Increased renal cortical echogenicity. No hydronephrosis.     Left kidney: 9.6 x 5.3 x 5.1 cm. Simple appearing left renal cyst  without complex features measuring up to 1.1 cm.     Bladder is trabeculated. Prostate is borderline enlarged measuring 3.6 x  3.0 x 4.9 cm.       Impression:      1. Increased bilateral renal cortical echogenicity which can be seen  with medical renal disease. No hydronephrosis.  2. Cirrhotic liver morphology with trace ascites and bilateral pleural  effusions.  3. Trabeculated bladder which may be related to chronic bladder  outlet  obstruction.           This report was signed and finalized on 1/12/2025 4:45 PM by Jake Briseno.       US Renal Artery Complete [[id number]] Collected: 01/12/25 1641     Updated: 01/14/25 1340    Narrative:      US RENAL BILATERAL- 1/12/2025 12:40 PM     HISTORY: CKD; I50.31-Acute diastolic (congestive) heart failure;  N18.4-Chronic kidney disease, stage 4 (severe); I10-Essential (primary)  hypertension; R00.1-Bradycardia, unspecified; R05.8-Other specified  cough; R79.89-Other specified abnormal findings of blood chemistry     COMPARISON: 7/12/2021     TECHNIQUE: Multiple longitudinal and transverse realtime sonographic  images of the kidneys and urinary bladder are obtained. Images stored in  PACS [facility] institutional protocol.     FINDINGS:      Trace perihepatic ascites. Cirrhotic liver morphology. Bilateral pleural  effusions.     Right kidney measures 9.5 x 4.1 x 4.6 cm. 1.7 cm cyst without complex  features. Increased renal cortical echogenicity. No hydronephrosis.     Left kidney: 9.6 x 5.3 x 5.1 cm. Simple appearing left renal cyst  without complex features measuring up to 1.1 cm.     Bladder is trabeculated. Prostate is borderline enlarged measuring 3.6 x  3.0 x 4.9 cm.       Impression:      1. Increased bilateral renal cortical echogenicity which can be seen  with medical renal disease. No hydronephrosis.  2. Cirrhotic liver morphology with trace ascites and bilateral pleural  effusions.  3. Trabeculated bladder which may be related to chronic bladder outlet  obstruction.           This report was signed and finalized on 1/12/2025 4:45 PM by Jake Briseno.               Culture:  Blood Culture   Date Value Ref Range Status   01/10/2025 No growth at 2 days  Preliminary   01/10/2025 No growth at 2 days  Preliminary         Assessment   Acute kidney injury/cardiorenal syndrome  Stage IV CKD baseline.  Acute on chronic diastolic CHF  Hypertension  Crohn's disease  Metabolic acidosis  Anemia  of CKD    Plan:  Agree with p.o. diuretics.  Baseline iron studies./PTH level.  Continue to monitor renal function.      Giuliano Saldana MD  1/14/2025  13:58 CST

## 2025-01-14 NOTE — PLAN OF CARE
Goal Outcome Evaluation:           Progress: improving  Outcome Evaluation: No events overnight. SB/SR 53-03

## 2025-01-14 NOTE — CASE MANAGEMENT/SOCIAL WORK
Continued Stay Note   [facility]     Patient Name: Ricardo Hugo  MRN: [id number]  Today's Date: 1/14/2025    Admit Date: 1/10/2025    Plan: Home   Discharge Plan       Row Name 01/14/25 0930       Plan    Plan Home    Patient/Family in Agreement with Plan yes    Plan Comments Pt will return home alone, family to assist if needed. P.T. and O.T. signed off earlier in stay as he was back to baseline. Will follow.                   Discharge Codes    No documentation.                 Expected Discharge Date and Time       Expected Discharge Date Expected Discharge Time    Bob 15, 2025               CARLITOS [place]

## 2025-01-15 LAB
ALBUMIN SERPL-MCNC: 3.1 G/DL (ref 3.5–5.2)
ALBUMIN/GLOB SERPL: 1.4 G/DL
ALP SERPL-CCNC: 141 U/L (ref 39–117)
ALT SERPL W P-5'-P-CCNC: 8 U/L (ref 1–41)
ANION GAP SERPL CALCULATED.3IONS-SCNC: 14 MMOL/L (ref 5–15)
ANISOCYTOSIS BLD QL: ABNORMAL
ARTERIAL PATENCY WRIST A: ABNORMAL
AST SERPL-CCNC: 15 U/L (ref 1–40)
ATMOSPHERIC PRESS: 767 MMHG
BACTERIA SPEC AEROBE CULT: NORMAL
BACTERIA SPEC AEROBE CULT: NORMAL
BASE EXCESS BLDA CALC-SCNC: 0.9 MMOL/L (ref 0–2)
BASOPHILS # BLD MANUAL: 0.12 10*3/MM3 (ref 0–0.2)
BASOPHILS NFR BLD MANUAL: 2 % (ref 0–1.5)
BDY SITE: ABNORMAL
BILIRUB SERPL-MCNC: 0.3 MG/DL (ref 0–1.2)
BODY TEMPERATURE: 37
BUN SERPL-MCNC: 81 MG/DL (ref 8–23)
BUN/CREAT SERPL: 21.3 (ref 7–25)
CALCIUM SPEC-SCNC: 8.8 MG/DL (ref 8.6–10.5)
CHLORIDE SERPL-SCNC: 103 MMOL/L (ref 98–107)
CO2 SERPL-SCNC: 22 MMOL/L (ref 22–29)
CREAT SERPL-MCNC: 3.81 MG/DL (ref 0.76–1.27)
DEPRECATED RDW RBC AUTO: 58.4 FL (ref 37–54)
EGFRCR SERPLBLD CKD-EPI 2021: 15.7 ML/MIN/1.73
EOSINOPHIL # BLD MANUAL: 0.43 10*3/MM3 (ref 0–0.4)
EOSINOPHIL NFR BLD MANUAL: 7.1 % (ref 0.3–6.2)
ERYTHROCYTE [DISTWIDTH] IN BLOOD BY AUTOMATED COUNT: 15.7 % (ref 12.3–15.4)
GLOBULIN UR ELPH-MCNC: 2.2 GM/DL
GLUCOSE SERPL-MCNC: 109 MG/DL (ref 65–99)
HCO3 BLDA-SCNC: 25.4 MMOL/L (ref 20–26)
HCT VFR BLD AUTO: 27.6 % (ref 37.5–51)
HGB BLD-MCNC: 8.9 G/DL (ref 13–17.7)
LYMPHOCYTES # BLD MANUAL: 0.31 10*3/MM3 (ref 0.7–3.1)
LYMPHOCYTES NFR BLD MANUAL: 4.1 % (ref 5–12)
Lab: ABNORMAL
MCH RBC QN AUTO: 32.5 PG (ref 26.6–33)
MCHC RBC AUTO-ENTMCNC: 32.2 G/DL (ref 31.5–35.7)
MCV RBC AUTO: 100.7 FL (ref 79–97)
MODALITY: ABNORMAL
MONOCYTES # BLD: 0.25 10*3/MM3 (ref 0.1–0.9)
NEUTROPHILS # BLD AUTO: 4.93 10*3/MM3 (ref 1.7–7)
NEUTROPHILS NFR BLD MANUAL: 81.6 % (ref 42.7–76)
OVALOCYTES BLD QL SMEAR: ABNORMAL
PCO2 BLDA: 39 MM HG (ref 35–45)
PCO2 TEMP ADJ BLD: 39 MM HG (ref 35–45)
PH BLDA: 7.42 PH UNITS (ref 7.35–7.45)
PH, TEMP CORRECTED: 7.42 PH UNITS (ref 7.35–7.45)
PLATELET # BLD AUTO: 85 10*3/MM3 (ref 140–450)
PMV BLD AUTO: 9.6 FL (ref 6–12)
PO2 BLDA: 78.9 MM HG (ref 83–108)
PO2 TEMP ADJ BLD: 78.9 MM HG (ref 83–108)
POIKILOCYTOSIS BLD QL SMEAR: ABNORMAL
POLYCHROMASIA BLD QL SMEAR: ABNORMAL
POTASSIUM SERPL-SCNC: 4.2 MMOL/L (ref 3.5–5.2)
PROT SERPL-MCNC: 5.3 G/DL (ref 6–8.5)
RBC # BLD AUTO: 2.74 10*6/MM3 (ref 4.14–5.8)
SAO2 % BLDCOA: 96.3 % (ref 94–99)
SMALL PLATELETS BLD QL SMEAR: ABNORMAL
SODIUM SERPL-SCNC: 139 MMOL/L (ref 136–145)
VARIANT LYMPHS NFR BLD MANUAL: 2 % (ref 0–5)
VARIANT LYMPHS NFR BLD MANUAL: 3.1 % (ref 19.6–45.3)
VENTILATOR MODE: ABNORMAL
WBC MORPH BLD: NORMAL
WBC NRBC COR # BLD AUTO: 6.04 10*3/MM3 (ref 3.4–10.8)

## 2025-01-15 PROCEDURE — 25010000002 EPOETIN ALFA-EPBX 10000 UNIT/ML SOLUTION: Performed by: INTERNAL MEDICINE

## 2025-01-15 PROCEDURE — 94618 PULMONARY STRESS TESTING: CPT

## 2025-01-15 PROCEDURE — 85007 BL SMEAR W/DIFF WBC COUNT: CPT | Performed by: INTERNAL MEDICINE

## 2025-01-15 PROCEDURE — 99232 SBSQ HOSP IP/OBS MODERATE 35: CPT | Performed by: NURSE PRACTITIONER

## 2025-01-15 PROCEDURE — 85025 COMPLETE CBC W/AUTO DIFF WBC: CPT | Performed by: INTERNAL MEDICINE

## 2025-01-15 PROCEDURE — 36600 WITHDRAWAL OF ARTERIAL BLOOD: CPT

## 2025-01-15 PROCEDURE — 82803 BLOOD GASES ANY COMBINATION: CPT

## 2025-01-15 PROCEDURE — 80053 COMPREHEN METABOLIC PANEL: CPT | Performed by: INTERNAL MEDICINE

## 2025-01-15 RX ORDER — SODIUM BICARBONATE 650 MG/1
1300 TABLET ORAL EVERY 12 HOURS
Qty: 120 TABLET | Refills: 0 | Status: SHIPPED | OUTPATIENT
Start: 2025-01-15 | End: 2025-02-14

## 2025-01-15 RX ORDER — CALCITRIOL 0.5 UG/1
0.5 CAPSULE, LIQUID FILLED ORAL DAILY
Qty: 30 CAPSULE | Refills: 0 | Status: SHIPPED | OUTPATIENT
Start: 2025-01-16 | End: 2025-01-28 | Stop reason: SDUPTHER

## 2025-01-15 RX ORDER — CHOLESTYRAMINE 4 G/9G
POWDER, FOR SUSPENSION ORAL
Qty: 90 PACKET | Refills: 3 | OUTPATIENT
Start: 2025-01-15

## 2025-01-15 RX ORDER — CLONIDINE 0.2 MG/24H
1 PATCH, EXTENDED RELEASE TRANSDERMAL WEEKLY
Status: DISCONTINUED | OUTPATIENT
Start: 2025-01-16 | End: 2025-01-16 | Stop reason: HOSPADM

## 2025-01-15 RX ORDER — ISOSORBIDE DINITRATE 10 MG/1
10 TABLET ORAL
Status: DISCONTINUED | OUTPATIENT
Start: 2025-01-15 | End: 2025-01-16 | Stop reason: HOSPADM

## 2025-01-15 RX ORDER — FUROSEMIDE 40 MG/1
40 TABLET ORAL DAILY
Qty: 30 TABLET | Refills: 0 | Status: SHIPPED | OUTPATIENT
Start: 2025-01-16 | End: 2025-01-28 | Stop reason: SDUPTHER

## 2025-01-15 RX ORDER — MONTELUKAST SODIUM 10 MG/1
10 TABLET ORAL NIGHTLY
Qty: 30 TABLET | Refills: 0 | Status: SHIPPED | OUTPATIENT
Start: 2025-01-15 | End: 2025-01-20

## 2025-01-15 RX ORDER — AMLODIPINE BESYLATE 10 MG/1
10 TABLET ORAL
Status: DISCONTINUED | OUTPATIENT
Start: 2025-01-16 | End: 2025-01-16

## 2025-01-15 RX ORDER — SODIUM BICARBONATE 650 MG/1
650 TABLET ORAL
Qty: 30 TABLET | Refills: 0 | Status: SHIPPED | OUTPATIENT
Start: 2025-01-15 | End: 2025-02-14

## 2025-01-15 RX ORDER — AMLODIPINE BESYLATE 5 MG/1
5 TABLET ORAL
Status: DISCONTINUED | OUTPATIENT
Start: 2025-01-16 | End: 2025-01-15

## 2025-01-15 RX ORDER — CARVEDILOL 6.25 MG/1
12.5 TABLET ORAL 2 TIMES DAILY WITH MEALS
Status: DISCONTINUED | OUTPATIENT
Start: 2025-01-16 | End: 2025-01-15

## 2025-01-15 RX ORDER — CARVEDILOL 6.25 MG/1
6.25 TABLET ORAL 2 TIMES DAILY WITH MEALS
Status: DISCONTINUED | OUTPATIENT
Start: 2025-01-16 | End: 2025-01-16

## 2025-01-15 RX ADMIN — ATORVASTATIN CALCIUM 10 MG: 10 TABLET, FILM COATED ORAL at 08:15

## 2025-01-15 RX ADMIN — HYDRALAZINE HYDROCHLORIDE 100 MG: 50 TABLET ORAL at 08:16

## 2025-01-15 RX ADMIN — CHOLESTYRAMINE 1 PACKET: 4 POWDER, FOR SUSPENSION ORAL at 08:16

## 2025-01-15 RX ADMIN — HYDRALAZINE HYDROCHLORIDE 100 MG: 50 TABLET ORAL at 17:02

## 2025-01-15 RX ADMIN — MONTELUKAST SODIUM 10 MG: 10 TABLET, COATED ORAL at 21:49

## 2025-01-15 RX ADMIN — Medication 10 ML: at 21:49

## 2025-01-15 RX ADMIN — MESALAMINE 1.5 G: 375 CAPSULE, EXTENDED RELEASE ORAL at 08:15

## 2025-01-15 RX ADMIN — TAMSULOSIN HYDROCHLORIDE 0.4 MG: 0.4 CAPSULE ORAL at 21:49

## 2025-01-15 RX ADMIN — EPOETIN ALFA-EPBX 10000 UNITS: 10000 INJECTION, SOLUTION INTRAVENOUS; SUBCUTANEOUS at 08:26

## 2025-01-15 RX ADMIN — DOCUSATE SODIUM 100 MG: 100 CAPSULE, LIQUID FILLED ORAL at 21:49

## 2025-01-15 RX ADMIN — EMPAGLIFLOZIN 10 MG: 10 TABLET, FILM COATED ORAL at 08:16

## 2025-01-15 RX ADMIN — ASPIRIN 81 MG: 81 TABLET, COATED ORAL at 08:15

## 2025-01-15 RX ADMIN — NIFEDIPINE 120 MG: 60 TABLET, FILM COATED, EXTENDED RELEASE ORAL at 08:15

## 2025-01-15 RX ADMIN — VALSARTAN 320 MG: 80 TABLET, FILM COATED ORAL at 08:15

## 2025-01-15 RX ADMIN — CLONIDINE HYDROCHLORIDE 0.3 MG: 0.1 TABLET ORAL at 21:48

## 2025-01-15 RX ADMIN — CLONIDINE HYDROCHLORIDE 0.3 MG: 0.1 TABLET ORAL at 08:16

## 2025-01-15 RX ADMIN — DOCUSATE SODIUM 100 MG: 100 CAPSULE, LIQUID FILLED ORAL at 08:15

## 2025-01-15 RX ADMIN — LEVOTHYROXINE SODIUM 75 MCG: 75 TABLET ORAL at 06:02

## 2025-01-15 RX ADMIN — SODIUM BICARBONATE 1300 MG: 650 TABLET ORAL at 08:26

## 2025-01-15 RX ADMIN — CETIRIZINE HYDROCHLORIDE TABLETS 10 MG: 10 TABLET, FILM COATED ORAL at 08:15

## 2025-01-15 RX ADMIN — CLONIDINE HYDROCHLORIDE 0.3 MG: 0.1 TABLET ORAL at 17:02

## 2025-01-15 RX ADMIN — ISOSORBIDE DINITRATE 10 MG: 10 TABLET ORAL at 17:02

## 2025-01-15 RX ADMIN — SODIUM BICARBONATE 650 MG: 650 TABLET ORAL at 12:58

## 2025-01-15 RX ADMIN — Medication 10 MG: at 21:48

## 2025-01-15 RX ADMIN — FUROSEMIDE 40 MG: 40 TABLET ORAL at 08:15

## 2025-01-15 RX ADMIN — HYDRALAZINE HYDROCHLORIDE 100 MG: 50 TABLET ORAL at 21:48

## 2025-01-15 RX ADMIN — CALCITRIOL CAPSULES 0.25 MCG 0.5 MCG: 0.25 CAPSULE ORAL at 08:15

## 2025-01-15 RX ADMIN — PANTOPRAZOLE SODIUM 40 MG: 40 TABLET, DELAYED RELEASE ORAL at 06:02

## 2025-01-15 RX ADMIN — Medication 10 ML: at 08:26

## 2025-01-15 RX ADMIN — CLOPIDOGREL BISULFATE 75 MG: 75 TABLET ORAL at 08:15

## 2025-01-15 RX ADMIN — SODIUM BICARBONATE 1300 MG: 650 TABLET ORAL at 21:48

## 2025-01-15 RX ADMIN — ISOSORBIDE DINITRATE 10 MG: 10 TABLET ORAL at 12:59

## 2025-01-15 NOTE — DISCHARGE SUMMARY
[facility] Medicine Services  DISCHARGE SUMMARY       Date of Admission: 1/10/2025  Date of Discharge:  1/16/2025  Primary Care Physician: Nathan Zimmer MD    Presenting Problem/History of Present Illness:  Worsening shortness of breath    Final Discharge Diagnoses:  Resistant hypertension  Cardiorenal syndrome  CKD stage IV-   Acute on chronic diastolic CHF  Metabolic acidosis  Anemia of chronic kidney disease-on EPOetin; secondary hyperparathyroidism (on calcitriol)  Chronic thrombocytopenia-cirrhotic morphology on imaging study.  History of alcoholism  Mildly elevated TSH-repeat testing in outpatient setting in about 3 to 4 weeks.  Typically TSH greater than 10 is when medication should be started from recent review of literature  bilateral pleural effusion right greater than left  Bradycardia-noted that carvedilol has been on hold.  Abnormal CT of the main pulmonary artery concerning for pulmonary hypertension; elevated RV systolic pressure  Dilated ascending thoracic aorta measuring 4 cm-will need follow-up in outpatient (surveillance); optimize blood pressure control  Presence of mediastinal lymphadenopathy-reactive or neoplastic process considered.  Follow-up through primary care provider    Consults:   Cardiology  Nephrology    Procedures Performed:  None    Pertinent Test Results:   Results for orders placed during the hospital encounter of 01/10/25    Adult Transthoracic Echo Complete w/ Color, Spectral and Contrast if Necessary Per Protocol    Interpretation Summary    Left ventricular systolic function is normal. Left ventricular ejection fraction appears to be 56 - 60%.    Left ventricular wall thickness is consistent with mild septal asymmetric hypertrophy.    Left ventricular diastolic function is consistent with (grade II w/high LAP) pseudonormalization.    Normal right ventricular cavity size and systolic function noted.    The left atrial cavity is mild  to moderately dilated.    The right atrial cavity is dilated.    Mild aortic valve stenosis is present.    Mild to moderate mitral valve regurgitation is present.    Moderate to severe tricuspid valve regurgitation is present.    Estimated right ventricular systolic pressure from tricuspid regurgitation is markedly elevated (>55 mmHg).      Imaging Results (All)       Procedure Component Value Units Date/Time    US Renal Bilateral [[id number]] Collected: 01/12/25 1641     Updated: 01/14/25 1340    Narrative:      US RENAL BILATERAL- 1/12/2025 12:40 PM     HISTORY: CKD; I50.31-Acute diastolic (congestive) heart failure;  N18.4-Chronic kidney disease, stage 4 (severe); I10-Essential (primary)  hypertension; R00.1-Bradycardia, unspecified; R05.8-Other specified  cough; R79.89-Other specified abnormal findings of blood chemistry     COMPARISON: 7/12/2021     TECHNIQUE: Multiple longitudinal and transverse realtime sonographic  images of the kidneys and urinary bladder are obtained. Images stored in  PACS [facility] institutional protocol.     FINDINGS:      Trace perihepatic ascites. Cirrhotic liver morphology. Bilateral pleural  effusions.     Right kidney measures 9.5 x 4.1 x 4.6 cm. 1.7 cm cyst without complex  features. Increased renal cortical echogenicity. No hydronephrosis.     Left kidney: 9.6 x 5.3 x 5.1 cm. Simple appearing left renal cyst  without complex features measuring up to 1.1 cm.     Bladder is trabeculated. Prostate is borderline enlarged measuring 3.6 x  3.0 x 4.9 cm.       Impression:      1. Increased bilateral renal cortical echogenicity which can be seen  with medical renal disease. No hydronephrosis.  2. Cirrhotic liver morphology with trace ascites and bilateral pleural  effusions.  3. Trabeculated bladder which may be related to chronic bladder outlet  obstruction.           This report was signed and finalized on 1/12/2025 4:45 PM by Jake Briseno.       US Renal Artery Complete [[id number]]  Collected: 01/12/25 1641     Updated: 01/14/25 1340    Narrative:      US RENAL BILATERAL- 1/12/2025 12:40 PM     HISTORY: CKD; I50.31-Acute diastolic (congestive) heart failure;  N18.4-Chronic kidney disease, stage 4 (severe); I10-Essential (primary)  hypertension; R00.1-Bradycardia, unspecified; R05.8-Other specified  cough; R79.89-Other specified abnormal findings of blood chemistry     COMPARISON: 7/12/2021     TECHNIQUE: Multiple longitudinal and transverse realtime sonographic  images of the kidneys and urinary bladder are obtained. Images stored in  PACS [facility] institutional protocol.     FINDINGS:      Trace perihepatic ascites. Cirrhotic liver morphology. Bilateral pleural  effusions.     Right kidney measures 9.5 x 4.1 x 4.6 cm. 1.7 cm cyst without complex  features. Increased renal cortical echogenicity. No hydronephrosis.     Left kidney: 9.6 x 5.3 x 5.1 cm. Simple appearing left renal cyst  without complex features measuring up to 1.1 cm.     Bladder is trabeculated. Prostate is borderline enlarged measuring 3.6 x  3.0 x 4.9 cm.       Impression:      1. Increased bilateral renal cortical echogenicity which can be seen  with medical renal disease. No hydronephrosis.  2. Cirrhotic liver morphology with trace ascites and bilateral pleural  effusions.  3. Trabeculated bladder which may be related to chronic bladder outlet  obstruction.           This report was signed and finalized on 1/12/2025 4:45 PM by Jake Briseno.       CT Chest Without Contrast Diagnostic [[id number]] Collected: 01/11/25 0737     Updated: 01/11/25 0749    Narrative:      EXAMINATION:  CT CHEST WO CONTRAST DIAGNOSTIC-  1/11/2025 4:09 AM     HISTORY: Evaluate pneumonia.     COMPARISON : Chest x-ray 1/10/2025.     DLP: 169.96 mGy.cm Automated dosage reduction technique was utilized to  reduce patient dosage.     TECHNIQUE: Spiral CT was performed of the chest without contrast.  Sagittal and coronal images were reconstructed.      MEDIASTINUM, HEART AND VASCULAR STRUCTURES: There is carotid artery  calcification in the neck. There is atheromatous disease of the thoracic  aorta and coronary arteries. There is cardiomegaly. The ascending aorta  is dilated measuring 4 cm. Mid aortic arch measures 3.2 cm. Descending  aorta measures 2.9 cm. Main pulmonary artery segment dilated measuring  3.7 cm. There are enlarged mediastinal lymph nodes. There is a 1.3 cm  mediastinal lymph node to the left of the distal ascending aorta. There  is a 2.3 cm short axis diameter lymph node anterior to the distal  trachea. There is a 2.1 cm short axis diameter left paratracheal lymph  node. The hilar areas are not well evaluated due to the lack of contrast  administration. There are no enlarged axillary lymph nodes.     LUNGS: There is a large right pleural effusion. There is a small left  pleural effusion. There is near complete atelectasis of the right lower  lobe and partial collapse of the right middle lobe. There is patchy  consolidation in the left lower lobe. There is some degree of paraseptal  and centrilobular emphysema.     UPPER ABDOMEN: There is a small amount of ascites around the liver and  spleen. There is thickening of the gastric wall.     BONES: There is body wall edema. There are degenerative changes of the  spine and shoulders. There is avascular necrosis of the right humeral  head.          Impression:      1. Large right and small left pleural effusions.  2. Near complete collapse/atelectasis of the right lower lobe and  partial collapse/atelectasis of the right middle lobe. Patchy  consolidation in the left lower lobe is likely due to atelectasis.  Pneumonia cannot be ruled out.  3. Mediastinal lymphadenopathy. The lymph nodes could be reactive or  neoplastic. The largest lymph node is anterior to the distal trachea  with a short axis diameter of 2.3 cm.  4. Cardiomegaly. Atheromatous disease of the thoracic aorta and coronary  arteries.  Prior heart surgery. Dilated ascending thoracic aorta  measuring 4 cm. The remainder of the thoracic aorta is normal caliber.  Dilated main pulmonary artery segment measuring 3.7 cm suggesting  pulmonary hypertension.  5. Small amount of ascites around the liver and spleen. Body wall edema.  6. Thickening of the gastric wall may be due to underdistention.  Gastritis and other etiologies considered.  7. Avascular necrosis right humeral head. Degenerative changes of the  spine and shoulders.           This report was signed and finalized on 1/11/2025 7:46 AM by Dr. Mikael Gallegos MD.       XR Chest 1 View [[id number]] Collected: 01/10/25 1356     Updated: 01/10/25 1400    Narrative:      EXAM: XR CHEST 1 VW- 1/10/2025 12:46 PM     HISTORY: Short of breath and wheezing cough       COMPARISON: 12/26/2021.     TECHNIQUE: Single frontal radiograph of the chest was obtained.     FINDINGS:     Support Devices: Prior CABG.     Cardiac and Mediastinal Silhouettes: Cardiomegaly.     Lungs/Pleura: Small to moderate right and trace left pleural effusions  with mild interstitial opacities. No visible pneumothorax.     Osseous structures: No acute osseous finding.     Other: None.       Impression:         Small to moderate right and trace left pleural effusions with mild  interstitial opacities, suggestive of pulmonary edema.           This report was signed and finalized on 1/10/2025 1:57 PM by Jake Briseno.             LAB RESULTS:      Lab 01/16/25  0225 01/15/25  0214 01/14/25  0419 01/13/25  0439 01/12/25  0401 01/11/25  0522 01/10/25  1508   WBC 5.40 6.04 4.97 4.44 5.58   < > 5.17   HEMOGLOBIN 8.2* 8.9* 8.8* 9.0* 8.8*   < > 9.0*   HEMATOCRIT 26.2* 27.6* 28.5* 28.7* 28.6*   < > 28.6*   PLATELETS 81* 85* 83* 78* 94*   < > 80*   NEUTROS ABS 3.28 4.93 3.30 2.95 3.90  --  3.98   IMMATURE GRANS (ABS) 0.04  --  0.03 0.02 0.05  --  0.04   LYMPHS ABS 1.09  --  0.80 0.66* 0.81  --  0.50*   MONOS ABS 0.63  --  0.55 0.55 0.62  --   0.41   EOS ABS 0.29 0.43* 0.23 0.22 0.16  --  0.18   .1* 100.7* 103.6* 103.2* 104.8*   < > 103.2*   PROCALCITONIN  --   --   --   --   --   --  0.26*   LACTATE  --   --   --   --   --   --  0.9   PROTIME  --   --   --   --   --   --  18.1*   APTT  --   --   --   --   --   --  33.6    < > = values in this interval not displayed.         Lab 01/16/25  0225 01/15/25  0214 01/14/25  0419 01/13/25  0439 01/12/25  0401 01/11/25  0522 01/10/25  1508   SODIUM 140 139 140 140 139   < > 139   POTASSIUM 4.0 4.2 4.1 4.0 4.1   < > 4.4   CHLORIDE 105 103 104 106 108*   < > 104   CO2 22.0 22.0 22.0 22.0 19.0*   < > 17.0*   ANION GAP 13.0 14.0 14.0 12.0 12.0   < > 18.0*   BUN 79* 81* 77* 74* 76*   < > 58*   CREATININE 3.98* 3.81* 4.00* 3.55* 3.72*   < > 3.41*   EGFR 14.9* 15.7* 14.8* 17.1* 16.1*   < > 17.9*   GLUCOSE 108* 109* 108* 100* 104*   < > 85   CALCIUM 8.7 8.8 8.6 8.4* 8.2*   < > 8.6   MAGNESIUM  --   --   --   --   --   --  2.2   TSH  --   --   --  8.550*  --   --   --     < > = values in this interval not displayed.         Lab 01/16/25  0225 01/15/25  0214 01/14/25  0419 01/13/25  0439 01/12/25  0401   TOTAL PROTEIN 5.2* 5.3* 5.7* 5.7* 5.7*   ALBUMIN 3.0* 3.1* 3.3* 3.4* 3.4*   GLOBULIN 2.2 2.2 2.4 2.3 2.3   ALT (SGPT) 8 8 9 9 8   AST (SGOT) 15 15 16 16 17   BILIRUBIN 0.4 0.3 0.4 0.3 0.3   ALK PHOS 142* 141* 140* 124* 133*         Lab 01/10/25  1613 01/10/25  1508   PROBNP  --  32,562.0*   HSTROP T 62* 65*   PROTIME  --  18.1*   INR  --  1.41*         Lab 01/12/25  0401   CHOLESTEROL 104   LDL CHOL 60   HDL CHOL 31*   TRIGLYCERIDES 59         Lab 01/13/25  0439   IRON 78   IRON SATURATION (TSAT) 34   TIBC 229*   TRANSFERRIN 154*         Lab 01/15/25  1039 01/10/25  1347   PH, ARTERIAL 7.422 7.351   PCO2, ARTERIAL 39.0 32.3*   PO2 ART 78.9* 64.5*   O2 SATURATION ART 96.3 92.5*   HCO3 ART 25.4 17.8*   BASE EXCESS ART 0.9 -7.0*   CARBOXYHEMOGLOBIN  --  2.8     Brief Urine Lab Results  (Last result in the past 365  days)        Color   Clarity   Blood   Leuk Est   Nitrite   Protein   CREAT   Urine HCG        01/12/25 1100             26.1         01/12/25 1100 Yellow   Clear   Negative   Negative   Negative   100 mg/dL (2+)                 Microbiology Results (last 10 days)       Procedure Component Value - Date/Time    Eosinophil Smear - Urine, Urine, Clean Catch [[id number]]  (Normal) Collected: 01/12/25 1100    Lab Status: Final result Specimen: Urine, Clean Catch Updated: 01/12/25 2123     Eosinophil Smear 0 % EOS/100 Cells     Blood Culture - Blood, Arm, Right [[id number]]  (Normal) Collected: 01/10/25 1508    Lab Status: Final result Specimen: Blood from Arm, Right Updated: 01/15/25 1531     Blood Culture No growth at 5 days    Blood Culture - Blood, Arm, Right [[id number]]  (Normal) Collected: 01/10/25 1440    Lab Status: Final result Specimen: Blood from Arm, Right Updated: 01/15/25 1500     Blood Culture No growth at 5 days    Respiratory Panel PCR w/COVID-19(SARS-CoV-2) TOSIN/JOVAN/ANTONIO/PAD/COR/CHARLENE In-House, NP Swab in UTM/VTM, 2 HR TAT - Swab, Nasopharynx [[id number]]  (Normal) Collected: 01/10/25 1434    Lab Status: Final result Specimen: Swab from Nasopharynx Updated: 01/10/25 1553     ADENOVIRUS, PCR Not Detected     Coronavirus 229E Not Detected     Coronavirus HKU1 Not Detected     Coronavirus NL63 Not Detected     Coronavirus OC43 Not Detected     COVID19 Not Detected     Human Metapneumovirus Not Detected     Human Rhinovirus/Enterovirus Not Detected     Influenza A PCR Not Detected     Influenza B PCR Not Detected     Parainfluenza Virus 1 Not Detected     Parainfluenza Virus 2 Not Detected     Parainfluenza Virus 3 Not Detected     Parainfluenza Virus 4 Not Detected     RSV, PCR Not Detected     Bordetella pertussis pcr Not Detected     Bordetella parapertussis PCR Not Detected     Chlamydophila pneumoniae PCR Not Detected     Mycoplasma pneumo by PCR Not Detected    Narrative:      In the setting of a positive  respiratory panel with a viral infection PLUS a negative procalcitonin without other underlying concern for bacterial infection, consider observing off antibiotics or discontinuation of antibiotics and continue supportive care. If the respiratory panel is positive for atypical bacterial infection (Bordetella pertussis, Chlamydophila pneumoniae, or Mycoplasma pneumoniae), consider antibiotic de-escalation to target atypical bacterial infection.            Hospital Course:   The patient who I started seeing on day 3 of hospitalization  He presented with worsening shortness of breath.  Since his admission he was seen by nephrology and cardiology.  He underwent workup for secondary hypertension.  Cardiology adjusted medications and monitored blood pressure.  Medications now as listed below.  Most recent blood pressures 160/64.  This was after taking due medications when he had an earlier blood pressure reading of 208/62.  Spoke to NP Jagdeep who held back yesterday's discharge plan to make adjustment on her medications.  She feels more comfortable based on pattern of breathing.  Notes that she follows this patient in outpatient setting as well.  I will defer further management in optimizing his blood pressure and following through the test results in evaluation of secondary hypertension to her, nephrologist and primary care provider.  Patient feels secure as well and expressed readiness to go home since yesterday.      Evaluate need for oxygen, we did a walking oximetry.  He did not qualify this.  Due to elevated RV systolic pressure seen in patients with pulmonary hypertension (in his case I do not know what it is today etiology), I checked his ABG to look for other qualifiers for home.  His PaO2 is 79.  He has no erythrocytosis.  Noted that he has diastolic dysfunction likely related to his resistant hypertension on multiple medications.  Also, I have made him aware of dilated ascending thoracic aorta measuring 4 cm  "that will require surveillance in outpatient setting care off primary care provider.  On top of this he has dilated pulmonary artery segment measuring 3.7 cm suggesting pulmonary hypertension.  I will defer to cardiology further workup such as right heart catheterization if this has not been done yet to further evaluate pulmonary hypertension.    Nephrology followed this patient during this hospitalization.  They felt patient has acute kidney injury/cardiorenal syndrome.  Other assessment includes:  1.  Acute kidney injury/cardiorenal syndrome  2.Stage IV CKD baseline.  3.  Hypertensive renal disease.  4.  Acute on chronic CHF improved.  5.  Anemia of chronic kidney disease.  6.  Poorly controlled systolic and diastolic hypertension.     Plan:  1.  Continue p.o. Lasix.  2.  Increase blood pressure medications.  3.  Continue to monitor renal function closely    I will defer to them use of EPO in outpatient setting in reference to anemia of chronic disease as I do not have any experience of this outside dialysis patients who receive this on dialysis days.    Patient is hemodynamically stable and appropriate for discharge.   I answered his questions to the best of my abilities.  I worked with consultants recommendations.      The renal artery ultrasound showed no significant renal artery stenosis.    Physical Exam on Discharge:  /64 (BP Location: Left arm, Patient Position: Lying)   Pulse 63   Temp 97.7 °F (36.5 °C) (Oral)   Resp 16   Ht 182.9 cm (72\")   Wt 60 kg (132 lb 3.2 oz)   SpO2 96%   BMI 17.93 kg/m²   Physical Exam  Pleasant man  No distress  He is maintaining adequate oxygenation in room air.     No distress  No thyromegaly  No gross obvious signs of fluid retention  Diminished breath sound with adequate air exchange and no obvious adventitious sound  Positive murmur  Regular rate and rhythm from what I heard it.  Soft flat abdomen  Warm dry skin with good capillary refill  Appropriate affect  No " gross focal neurologic deficit.       Condition on Discharge: Stable    Discharge Disposition:  Home or Self Care    Discharge Medications:     Discharge Medications        New Medications        Instructions Start Date   calcitriol 0.5 MCG capsule  Commonly known as: ROCALTROL   0.5 mcg, Oral, Daily      empagliflozin 10 MG tablet tablet  Commonly known as: JARDIANCE   10 mg, Oral, Daily      isosorbide dinitrate 10 MG tablet  Commonly known as: ISORDIL   10 mg, Oral, 3 Times Daily (Nitrates)      melatonin 5 MG tablet tablet  Replaces: Melatonin 10 MG capsule   10 mg, Oral, Nightly             Changes to Medications        Instructions Start Date   furosemide 40 MG tablet  Commonly known as: LASIX  What changed:   medication strength  how much to take  when to take this   40 mg, Oral, Daily      montelukast 10 MG tablet  Commonly known as: SINGULAIR  What changed: how to take this   10 mg, Oral, Nightly      montelukast 10 MG tablet  Commonly known as: SINGULAIR  What changed: Another medication with the same name was changed. Make sure you understand how and when to take each.   10 mg, Nightly      NIFEdipine CC 60 MG 24 hr tablet  Commonly known as: ADALAT CC  What changed:   medication strength  how much to take  Another medication with the same name was removed. Continue taking this medication, and follow the directions you see here.   120 mg, Oral, Daily      omeprazole 20 MG capsule  Commonly known as: priLOSEC  What changed: Another medication with the same name was removed. Continue taking this medication, and follow the directions you see here.   20 mg, Daily      sodium bicarbonate 650 MG tablet  What changed:   how much to take  when to take this  additional instructions   1,300 mg, Oral, Every 12 Hours      sodium bicarbonate 650 MG tablet  What changed: You were already taking a medication with the same name, and this prescription was added. Make sure you understand how and when to take each.   650  mg, Oral, Daily With Lunch             Continue These Medications        Instructions Start Date   albuterol sulfate  (90 Base) MCG/ACT inhaler  Commonly known as: PROVENTIL HFA;VENTOLIN HFA;PROAIR HFA   2 puffs, 4 Times Daily PRN      ALPRAZolam 0.25 MG tablet  Commonly known as: XANAX   0.25 mg, Nightly      aspirin 81 MG EC tablet   81 mg, Oral, Daily      atorvastatin 10 MG tablet  Commonly known as: LIPITOR   10 mg, Oral, Daily      Breztri Aerosphere 160-9-4.8 MCG/ACT aerosol inhaler  Generic drug: Budeson-Glycopyrrol-Formoterol   2 puffs, Inhalation, 2 Times Daily      cholestyramine light 4 g packet   4 g, Daily      cloNIDine 0.2 MG/24HR patch  Commonly known as: CATAPRES-TTS   1 patch, Transdermal, Weekly, THURSDAYS      cloNIDine 0.3 MG tablet  Commonly known as: CATAPRES   0.3 mg, Oral, 3 Times Daily      clopidogrel 75 MG tablet  Commonly known as: PLAVIX   75 mg, Daily      Copper Caps 2 MG capsule  Generic drug: Copper Gluconate   1 capsule, Oral, Daily      docusate sodium 100 MG capsule  Commonly known as: COLACE   100 mg, Oral, 3 Times Daily PRN      fexofenadine 180 MG tablet  Commonly known as: ALLEGRA   180 mg, Oral, Daily      fluticasone 50 MCG/ACT nasal spray  Commonly known as: FLONASE   2 sprays, Daily      hydrALAZINE 100 MG tablet  Commonly known as: APRESOLINE   100 mg, Oral, 3 Times Daily, 100mg daily      levothyroxine 75 MCG tablet  Commonly known as: Synthroid   75 mcg, Oral, Daily      mesalamine 0.375 g 24 hr capsule  Commonly known as: APRISO   4 capsules, Daily      PreserVision/Lutein capsule   1 capsule, 2 times daily      tamsulosin 0.4 MG capsule 24 hr capsule  Commonly known as: FLOMAX   0.4 mg, Oral, Nightly      valsartan 320 MG tablet  Commonly known as: DIOVAN   320 mg, Oral, Daily      vitamin D 1.25 MG (13374 UT) capsule capsule  Commonly known as: ERGOCALCIFEROL   50,000 Units, Weekly             Stop These Medications      carvedilol 25 MG tablet  Commonly  known as: COREG     cefdinir 300 MG capsule  Commonly known as: OMNICEF     Melatonin 10 MG capsule  Replaced by: melatonin 5 MG tablet tablet     SLOW-MAG PO     spironolactone 25 MG tablet  Commonly known as: ALDACTONE              This patient has current or prior documentation of an left ventricular ejection fraction (LVEF) of less than or equal to 40%.  Not applicable based on above information  Results for orders placed during the hospital encounter of 01/10/25    Adult Transthoracic Echo Complete w/ Color, Spectral and Contrast if Necessary Per Protocol    Interpretation Summary    Left ventricular systolic function is normal. Left ventricular ejection fraction appears to be 56 - 60%.    Left ventricular wall thickness is consistent with mild septal asymmetric hypertrophy.    Left ventricular diastolic function is consistent with (grade II w/high LAP) pseudonormalization.    Normal right ventricular cavity size and systolic function noted.    The left atrial cavity is mild to moderately dilated.    The right atrial cavity is dilated.    Mild aortic valve stenosis is present.    Mild to moderate mitral valve regurgitation is present.    Moderate to severe tricuspid valve regurgitation is present.    Estimated right ventricular systolic pressure from tricuspid regurgitation is markedly elevated (>55 mmHg).            Discharge Diet:   Diet Instructions       Diet: Cardiac Diets; Healthy Heart (2-3 Na+); Soft to Chew (NDD 3); Whole Meat; Thin (IDDSI 0)      Discharge Diet: Cardiac Diets    Cardiac Diet: Healthy Heart (2-3 Na+)    Texture: Soft to Chew (NDD 3)    Soft to Chew: Whole Meat    Fluid Consistency: Thin (IDDSI 0)    Diet: Cardiac Diets; Low Sodium (2g)      Discharge Diet: Cardiac Diets    Cardiac Diet: Low Sodium (2g)    Texture: Regular Texture (IDDSI 7)    Fluid Consistency: Thin (IDDSI 0)            Activity at Discharge:   Activity Instructions       Gradually Increase Activity Until at  Pre-Hospitalization Level      Measure Weight Daily      Instruct patient on daily weights            Follow-up Appointments:   PCP 1 week  Cardiology per the recommendation  Nephrology per their recommendation    Future Appointments   Date Time Provider Department Center   1/20/2025  9:45 AM Rip Mcguire DO [facility] CD PAD PAD   1/21/2025 11:15 AM Ruthie Parra APRN [facility] PC VSQ PAD   1/23/2025  9:00 AM [facility] LAB [facility] PAD CCLAB PAD   1/23/2025  9:15 AM Becky Camacho APRN [facility] ONC PAD PAD   1/23/2025  9:30 AM CHAIR 24 [facility] PAD OP INFU ONC [facility] PAD OIONC PAD   2/10/2025  9:00 AM PAD US 1  PAD US PAD   2/21/2025  9:00 AM [facility] [place] VSQ PAD   2/25/2025  9:00 AM Nathan Zimmer MD [facility] PC VSQ PAD   3/3/2025 10:00 AM Jagdeep Moore APRN [facility] CD PAD PAD   11/4/2025 10:00 AM PAD US NIVAS CART 1  PAD NIVAS PAD   11/4/2025 11:00 AM PAD US NIVAS CART 1  PAD NIVAS PAD   11/6/2025 10:15 AM Elena Jon APRN [facility] VS PAD PAD       Test Results Pending at Discharge:       Electronically signed by Jamie Pineda MD, 01/16/25, 10:35 CST.    Time: Greater than 45 minutes

## 2025-01-15 NOTE — PROCEDURES
Exercise Oximetry    Patient Name:Ricardo Hugo   MRN: [id number]   Date: 01/15/25             ROOM AIR BASELINE   SpO2% 98   Heart Rate 76   Blood Pressure      EXERCISE ON ROOM AIR SpO2% EXERCISE ON O2 @  LPM SpO2%   1 MINUTE 98 1 MINUTE    2 MINUTES 97 2 MINUTES    3 MINUTES  3 MINUTES    4 MINUTES 92 4 MINUTES    5 MINUTES 95 5 MINUTES    6 MINUTES 97 6 MINUTES               Distance Walked  ambulated 6 minutes Distance Walked   Dyspnea (Sagar Scale)  Dyspnea (Sagar Scale)   Fatigue (Sagar Scale)   Fatigue (Sagar Scale)   SpO2% Post Exercise  SpO2% Post Exercise   HR Post Exercise 82 HR Post Exercise   Time to Recovery  Time to Recovery     Comments: NO NEEDS AT THIS TIME

## 2025-01-15 NOTE — PROGRESS NOTES
.[facility] Medicine Services  INPATIENT PROGRESS NOTE    Patient Name: Ricardo Hugo  Date of Admission: 1/10/2025  Today's Date: 01/15/25  Length of Stay: 5  Primary Care Physician: Nathan Zimmer MD    Subjective   Chief Complaint: Follow-up  HPI   Patient admitted on January 10  This is my first encounter with patient  Patient since admission has been seen by nephrology and cardiology  Cardiology service recommends workup for secondary causes of hypertension.  Nephrology on the other hand indicates renal function at baseline level and recommends continue titration of antihypertensive medication.    Patient presented with worsening shortness of breath and carries history of diastolic heart failure, coronary artery disease with prior coronary artery bypass graft, Crohn's disease, chronic kidney disease stage IV, hypertension, anemia of chronic disease.  Patient was described to be hypoxic in the mid 80s  He had recent antibiotic treatment for pneumonia (Augmentin with azithromycin then Omnicef)    Patient had blood pressure reading as high as 203/57 earlier.  Current antihypertensive medications include:     clonidine 0.3 mg 3 times daily  Hydralazine 100 mg 3 times daily  Lasix 40 mg IV twice daily  Nifedipine  mg daily  Valsartan 320 mg daily  Spironolactone 25 mg daily.      Patient has no new complaints  No headaches  No chest pain  No shortness of breath or difficulty breathing  Patient states that it is normal for him to have 200 blood pressure systolic.  He asked whether he is still on clonidine and I confirmed this based on above list of medications.  He said it will drop his blood pressure about 50 points.  He does not have any dizziness or lightheadedness even with this 50 points drop.      January 14.  Workup for secondary causes of hypertension in progress.    Blood pressure this morning is similar and in general improved compared  yesterday.        He has right greater than left pleural effusion on CT imaging of the chest and has a near complete collapse atelectasis of the right lower lobe and partial collapse atelectasis of right middle lobe.  There is described patchy consolidation of left lower lobe likely due to atelectasis.  There is mediastinal lymphadenopathy which could be reactive or neoplastic.  There is cardiomegaly and atheromatous disease of the thoracic and coronary arteries.  Dilated ascending thoracic aortic to measuring 4 cm also has dilated pulmonary artery measuring 3.7 cm suggesting pulmonary hypertension.  Small amount of ascites around liver and spleen with body wall edema.  Vascular necrosis of right humeral head  As per ultrasound of kidneys it also mentioned a cirrhotic morphology with trace ascites of the liver.  I revisited patient's past medical history from admitting history and has several listed history almost a page full in this computer screen.      He has no new complaints.  He has no dizziness or lightheadedness  He has no chest pain or shortness of breath.  He was just ruled in.  He said he went for vascular study.      January 15.  The plan for his discharge.  In fact I accomplish the discharge summary and medications.  Discussed the case with cardiology after the information had been completed and they further recommend changing medications.  There are medications that were not placed in the discharge when I reviewed it list because this were discontinued yesterday as per record by nephrology after the cardiologist had written their note stating the medications that this patient should be on.    I also reached out to ultrasound department regarding the renal artery ultrasound which until now has not been resulted.  It is currently in process.  I was assured by Elvia that it was done and awaiting to be read.    1 other thing that is not reflected in the current medication is the clonidine patch.  This was  confirmed by our pharmacist with ELLE Gannon.    I will defer further management of blood pressure and will follow along recommendation by cardiology.  ELLE Gannon indicates that she had seen this patient in outpatient in the past and systolic blood pressure about 145    Current systolic blood pressure is 170 while diastolics is 100 this was before receiving his morning medications.  NP Jagdeep expressed that she would feel more comfortable if blood pressure is about 140 -150 systolic.    She made changes hoping to achieve the goal.    Review of Systems   All pertinent negatives and positives are as above. All other systems have been reviewed and are negative unless otherwise stated.     Objective    Temp:  [97.4 °F (36.3 °C)-98.5 °F (36.9 °C)] 98.3 °F (36.8 °C)  Heart Rate:  [56-75] 58  Resp:  [16-18] 16  BP: (153-196)/() 196/57  Physical Exam  Pleasant man  No distress  He is maintaining adequate oxygenation in room air.    No distress  No thyromegaly  No gross obvious signs of fluid retention  Diminished breath sound with adequate air exchange and no obvious adventitious sound  Positive murmur  Regular rate and rhythm from what I heard it.  Soft flat abdomen  Warm dry skin with good capillary refill  Appropriate affect  No gross focal neurologic deficit.        Results Review:  I have reviewed the labs, radiology results, and diagnostic studies.    Laboratory Data:   Results from last 7 days   Lab Units 01/15/25  0214 01/14/25  0419 01/13/25  0439   WBC 10*3/mm3 6.04 4.97 4.44   HEMOGLOBIN g/dL 8.9* 8.8* 9.0*   HEMATOCRIT % 27.6* 28.5* 28.7*   PLATELETS 10*3/mm3 85* 83* 78*        Results from last 7 days   Lab Units 01/15/25  0214 01/14/25  0419 01/13/25  0439   SODIUM mmol/L 139 140 140   POTASSIUM mmol/L 4.2 4.1 4.0   CHLORIDE mmol/L 103 104 106   CO2 mmol/L 22.0 22.0 22.0   BUN mg/dL 81* 77* 74*   CREATININE mg/dL 3.81* 4.00* 3.55*   CALCIUM mg/dL 8.8 8.6 8.4*   BILIRUBIN mg/dL 0.3 0.4 0.3   ALK PHOS U/L 141*  140* 124*   ALT (SGPT) U/L 8 9 9   AST (SGOT) U/L 15 16 16   GLUCOSE mg/dL 109* 108* 100*       Culture Data:   Blood Culture   Date Value Ref Range Status   01/10/2025 No growth at 3 days  Preliminary   01/10/2025 No growth at 3 days  Preliminary       Radiology Data:   Imaging Results (Last 24 Hours)       Procedure Component Value Units Date/Time    US Renal Bilateral [[id number]] Collected: 01/12/25 1641     Updated: 01/14/25 1340    Narrative:      US RENAL BILATERAL- 1/12/2025 12:40 PM     HISTORY: CKD; I50.31-Acute diastolic (congestive) heart failure;  N18.4-Chronic kidney disease, stage 4 (severe); I10-Essential (primary)  hypertension; R00.1-Bradycardia, unspecified; R05.8-Other specified  cough; R79.89-Other specified abnormal findings of blood chemistry     COMPARISON: 7/12/2021     TECHNIQUE: Multiple longitudinal and transverse realtime sonographic  images of the kidneys and urinary bladder are obtained. Images stored in  PACS [facility] institutional protocol.     FINDINGS:      Trace perihepatic ascites. Cirrhotic liver morphology. Bilateral pleural  effusions.     Right kidney measures 9.5 x 4.1 x 4.6 cm. 1.7 cm cyst without complex  features. Increased renal cortical echogenicity. No hydronephrosis.     Left kidney: 9.6 x 5.3 x 5.1 cm. Simple appearing left renal cyst  without complex features measuring up to 1.1 cm.     Bladder is trabeculated. Prostate is borderline enlarged measuring 3.6 x  3.0 x 4.9 cm.       Impression:      1. Increased bilateral renal cortical echogenicity which can be seen  with medical renal disease. No hydronephrosis.  2. Cirrhotic liver morphology with trace ascites and bilateral pleural  effusions.  3. Trabeculated bladder which may be related to chronic bladder outlet  obstruction.           This report was signed and finalized on 1/12/2025 4:45 PM by Jake Briseno.       US Renal Artery Complete [[id number]] Collected: 01/12/25 1641     Updated: 01/14/25 7080     Narrative:      US RENAL BILATERAL- 1/12/2025 12:40 PM     HISTORY: CKD; I50.31-Acute diastolic (congestive) heart failure;  N18.4-Chronic kidney disease, stage 4 (severe); I10-Essential (primary)  hypertension; R00.1-Bradycardia, unspecified; R05.8-Other specified  cough; R79.89-Other specified abnormal findings of blood chemistry     COMPARISON: 7/12/2021     TECHNIQUE: Multiple longitudinal and transverse realtime sonographic  images of the kidneys and urinary bladder are obtained. Images stored in  PACS [facility] institutional protocol.     FINDINGS:      Trace perihepatic ascites. Cirrhotic liver morphology. Bilateral pleural  effusions.     Right kidney measures 9.5 x 4.1 x 4.6 cm. 1.7 cm cyst without complex  features. Increased renal cortical echogenicity. No hydronephrosis.     Left kidney: 9.6 x 5.3 x 5.1 cm. Simple appearing left renal cyst  without complex features measuring up to 1.1 cm.     Bladder is trabeculated. Prostate is borderline enlarged measuring 3.6 x  3.0 x 4.9 cm.       Impression:      1. Increased bilateral renal cortical echogenicity which can be seen  with medical renal disease. No hydronephrosis.  2. Cirrhotic liver morphology with trace ascites and bilateral pleural  effusions.  3. Trabeculated bladder which may be related to chronic bladder outlet  obstruction.           This report was signed and finalized on 1/12/2025 4:45 PM by Jake Briseno.               I have reviewed the patient's current medications.     Assessment/Plan   Assessment  Active Hospital Problems    Diagnosis     **Acute on chronic diastolic congestive heart failure     Abnormal TSH     Acute respiratory failure with hypoxia     Bradycardia     History of pneumonia, current treatment with cefdinir     CKD (chronic kidney disease) stage 4, GFR 15-29 ml/min     Essential hypertension          Medical Decision Making  Number and Complexity of problems:   Resistant hypertension  CKD stage IV-stable  creatinine  Acute on chronic diastolic CHF  Metabolic acidosis  Anemia of chronic kidney disease-on EPOetin; secondary hyperparathyroidism (on calcitriol)  Chronic thrombocytopenia-cirrhotic morphology on imaging study.  History of alcoholism  Mildly elevated TSH-repeat testing in outpatient setting in about 3 to 4 weeks.  Typically TSH greater than 10 is when medication should be started from recent review of literature bilateral pleural effusion right greater than left  Bradycardia-noted that carvedilol has been on hold.  Abnormal CT of the main pulmonary artery concerning for pulmonary hypertension  Dilated ascending thoracic aorta measuring 4 cm-will need follow-up in outpatient (surveillance); optimize blood pressure control  Presence of mediastinal lymphadenopathy-reactive or neoplastic process considered.  Will need to continue to monitor/follow-up.    Treatment Plan  Reviewed literature regarding resistant hypertension:  This is defined as  a blood pressure that remains above goal despite concurrent use of three antihypertensive agents of different classes taken at maximally tolerated doses and at appropriate dosing frequency, one of which should be a diuretic     Current antihypertensive medications include:    clonidine 0.3 mg 3 times daily  Hydralazine 100 mg 3 times daily  Lasix 40 mg IV twice daily  Nifedipine  mg daily  Valsartan 320 mg daily  Spironolactone 25 mg daily.            Calcium is 8.4, will check PTH  Potassium is normal at 4, sodium 140 (I would expect elevated sodium and low potassium and hyperaldosteronism) creatinine 3.5  TSH is 8.5  Will check plasma aldosterone plasma renin activity.  Will discuss with nephrology  plasma or 24-hour urine metanephrines (I noticed the last part was already ordered.)      January 14.    I have not seen the serum metanephrine result either as of yet    Monitor blood pressure on multiple medications    Monitor renal function and fluid  balance.    Appreciate cardiology and nephrology input      January 15.    The interested reader is referred to the interval history for details of encounter and changes made in medication.  Will hold off on discharge as per recommendation by cardiology service  Patient and significant other informed.    Medications reviewed  [START ON 1/16/2025] amLODIPine, 5 mg, Oral, Q24H  aspirin, 81 mg, Oral, Daily  atorvastatin, 10 mg, Oral, Daily  calcitriol, 0.5 mcg, Oral, Daily  [START ON 1/16/2025] carvedilol, 6.25 mg, Oral, BID With Meals  cetirizine, 10 mg, Oral, Daily  cholestyramine, 1 packet, Oral, Daily  cloNIDine, 0.3 mg, Oral, TID  [START ON 1/16/2025] cloNIDine, 1 patch, Transdermal, Weekly  clopidogrel, 75 mg, Oral, Daily  docusate sodium, 100 mg, Oral, TID  empagliflozin, 10 mg, Oral, Daily  epoetin cecile/cecile-epbx, 10,000 Units, Subcutaneous, Once per day on Monday Wednesday Friday  fluticasone, 2 spray, Each Nare, Daily  furosemide, 40 mg, Oral, Daily  hydrALAZINE, 100 mg, Oral, TID  isosorbide dinitrate, 10 mg, Oral, TID - Nitrates  levothyroxine, 75 mcg, Oral, Q AM  melatonin, 10 mg, Oral, Nightly  mesalamine, 1.5 g, Oral, Daily  montelukast, 10 mg, Oral, Nightly  pantoprazole, 40 mg, Oral, Q AM  sodium bicarbonate, 1,300 mg, Oral, Q12H  sodium bicarbonate, 650 mg, Oral, Daily With Lunch  sodium chloride, 10 mL, Intravenous, Q12H  tamsulosin, 0.4 mg, Oral, Nightly  valsartan, 320 mg, Oral, Daily                Conditions and Status     Fair     MDM Data  External documents reviewed: Epic record  Cardiac tracing (EKG, telemetry) interpretation: -  Radiology interpretation: Renal ultrasound reviewed.  Medical renal disease with no hydronephrosis; reported cirrhotic liver morphology with trace ascites and bilateral effusion.    Labs reviewed: Yes  Any tests that were considered but not ordered: None     Decision rules/scores evaluated (example [id number], [place], etc): -     Discussed with: Patient     Care  Planning  Shared decision making: Patient and consultants  Code status and discussions: Full code    Disposition  Social Determinants of Health that impact treatment or disposition: None identified at this time  I expect the patient to be discharged to (TBD)          Electronically signed by Jamie Pineda MD, 01/15/25, 11:35 CST.

## 2025-01-15 NOTE — PLAN OF CARE
Goal Outcome Evaluation:           Progress: no change  Outcome Evaluation: no events overnight

## 2025-01-15 NOTE — PROGRESS NOTES
Nephrology ([facility]) Progress Note      Patient:  Ricardo Hugo  YOB: 1950  Date of Service: 1/15/2025  MRN: [id number]   Acct: [id number]   Primary Care Physician: Nathan Zimmer MD  Advance Directive:   Code Status and Medical Interventions: CPR (Attempt to Resuscitate); Full Support   Ordered at: 01/10/25 1631     Code Status (Patient has no pulse and is not breathing):    CPR (Attempt to Resuscitate)     Medical Interventions (Patient has pulse or is breathing):    Full Support     Admit Date: 1/10/2025       Hospital Day: 5  Referring Provider: Vidal Miramontes MD      Patient personally seen and examined.  Complete chart including Consults, Notes, Operative Reports, Labs, Cardiology, and Radiology studies reviewed as able.        Subjective:  Ricardo Hugo is a 74 y.o. male for whom we were consulted for evaluation and treatment of chronic kidney disease stage 4. Baseline GFR 17, follows in our office. History of difficult to control hypertension, Crohn's disease, diastolic CHF. Presented to ER with increased dyspnea. Admitted for CHF exacerbation. Initial labs showed creatinine 3.4 which is essentially his baseline level. Denied edema or weight gain. Denied changes in urination. No n/v/d.    Today is awake and alert. No new complaint. Feels like he is at baseline. BP remains elevated. Urine output nonoliguric    Allergies:  Ondansetron, Zofran [ondansetron hcl], Lortab [hydrocodone-acetaminophen], and Allopurinol    Home Meds:  Facility-Administered Medications Prior to Admission   Medication Dose Route Frequency Provider Last Rate Last Admin    cyanocobalamin injection 1,000 mcg  1,000 mcg Intramuscular Q28 Days Ruthie Parra C, APRN   1,000 mcg at 08/11/23 1123     Medications Prior to Admission   Medication Sig Dispense Refill Last Dose/Taking    albuterol sulfate  (90 Base) MCG/ACT inhaler Inhale 2 puffs 4 (Four) Times a Day As Needed for  Wheezing or Shortness of Air.   Past Week    ALPRAZolam (XANAX) 0.25 MG tablet Take 1 tablet by mouth Every Night.   Past Week    aspirin (aspirin) 81 MG EC tablet Take 1 tablet by mouth Daily.   Past Week    atorvastatin (LIPITOR) 10 MG tablet Take 1 tablet by mouth Daily. 90 tablet 3 Past Week    Budeson-Glycopyrrol-Formoterol (Breztri Aerosphere) 160-9-4.8 MCG/ACT aerosol inhaler Inhale 2 puffs 2 (Two) Times a Day. 1 each 3 Past Week    carvedilol (COREG) 25 MG tablet Take 1 tablet by mouth 2 (Two) Times a Day With Meals. 180 tablet 3 Past Week    cefdinir (OMNICEF) 300 MG capsule Take 1 capsule by mouth Daily. 5 capsule 0 Past Week    cholestyramine light 4 g packet Take 1 packet by mouth Daily.   Past Week    cloNIDine (CATAPRES) 0.3 MG tablet Take 1 tablet by mouth 3 (Three) Times a Day. 270 tablet 2 Past Week    cloNIDine (CATAPRES-TTS) 0.2 MG/24HR patch Place 1 patch on the skin as directed by provider 1 (One) Time Per Week. THURSDAYS   Past Week    clopidogrel (PLAVIX) 75 MG tablet Take 1 tablet by mouth Daily.   Past Week    Copper Gluconate (Copper Caps) 2 MG capsule Take 2 mg by mouth Daily.   Past Week    docusate sodium (COLACE) 100 MG capsule Take 1 capsule by mouth 3 (Three) Times a Day As Needed for Constipation.   Past Week    fexofenadine (ALLEGRA) 180 MG tablet Take 1 tablet by mouth Daily.   Past Week    fluticasone (FLONASE) 50 MCG/ACT nasal spray Administer 2 sprays into the nostril(s) as directed by provider Daily.   Past Week    furosemide (Lasix) 20 MG tablet Take 1 tablet by mouth 3 (Three) Times a Day. 270 tablet 3 Past Week    hydrALAZINE (APRESOLINE) 100 MG tablet Take 1 tablet by mouth 3 (Three) Times a Day. 100mg daily   Past Week    levothyroxine (Synthroid) 75 MCG tablet Take 1 tablet by mouth Daily. 90 tablet 3 Past Week    Magnesium Cl-Calcium Carbonate (SLOW-MAG PO) Take 143 mg by mouth Daily.   Past Week    Melatonin 10 MG capsule Take 3 capsules by mouth Every Night.   Past  Week    mesalamine (APRISO) 0.375 g 24 hr capsule Take 4 capsules by mouth Daily.   Past Week    montelukast (SINGULAIR) 10 MG tablet Take 1 tablet by mouth Every Night.   Past Week    Multiple Vitamins-Minerals (PRESERVISION/LUTEIN) capsule Take 1 capsule by mouth 2 (two) times a day.   Past Week    NIFEdipine XL (PROCARDIA XL) 90 MG 24 hr tablet Take 1 tablet by mouth Daily.   Past Week    omeprazole (priLOSEC) 20 MG capsule Take 1 capsule by mouth Daily.   Past Week    sodium bicarbonate 650 MG tablet Take 5 tablets by mouth Daily. 2 qam, 1 at noon, and 2 qpm   Past Week    spironolactone (ALDACTONE) 25 MG tablet Take 1 tablet by mouth Daily. 90 tablet 2 Past Week    tamsulosin (FLOMAX) 0.4 MG capsule 24 hr capsule Take 1 capsule by mouth Every Night. 90 capsule 3 Past Week    valsartan (DIOVAN) 320 MG tablet Take 1 tablet by mouth Daily.   Past Week    vitamin D (ERGOCALCIFEROL) 1.25 MG (65149 UT) capsule capsule Take 1 capsule by mouth 1 (One) Time Per Week.   Past Week    [DISCONTINUED] omeprazole (priLOSEC) 20 MG capsule TAKE 1 CAPSULE DAILY 90 capsule 1 1/10/2025       Medicines:  Current Facility-Administered Medications   Medication Dose Route Frequency Provider Last Rate Last Admin    acetaminophen (TYLENOL) tablet 650 mg  650 mg Oral Q4H PRN Katherine Camarena APRN        Or    acetaminophen (TYLENOL) 160 MG/5ML oral solution 650 mg  650 mg Oral Q4H PRN Katherine Camarena APRN        Or    acetaminophen (TYLENOL) suppository 650 mg  650 mg Rectal Q4H PRN Katherine Camarena, APRN        aspirin EC tablet 81 mg  81 mg Oral Daily Katherine Camarena APRN   81 mg at 01/15/25 0815    atorvastatin (LIPITOR) tablet 10 mg  10 mg Oral Daily Katherine Camarena APRN   10 mg at 01/15/25 0815    sennosides-docusate (PERICOLACE) 8.6-50 MG per tablet 2 tablet  2 tablet Oral BID PRN Katherine Camarena APRN        And    polyethylene glycol (MIRALAX) packet 17 g  17 g Oral Daily PRN Katherine Camarena APRN        And     bisacodyl (DULCOLAX) EC tablet 5 mg  5 mg Oral Daily PRN Katherine Camarena APRSHERITA        And    bisacodyl (DULCOLAX) suppository 10 mg  10 mg Rectal Daily PRN Katherine Camarena APRN        calcitriol (ROCALTROL) capsule 0.5 mcg  0.5 mcg Oral Daily Giuliano Saldana MD   0.5 mcg at 01/15/25 0815    cetirizine (zyrTEC) tablet 10 mg  10 mg Oral Daily Katherine Camarena APRN   10 mg at 01/15/25 0815    cholestyramine (QUESTRAN) packet 1 packet  1 packet Oral Daily Katherine Camarena APRN   1 packet at 01/15/25 0816    cloNIDine (CATAPRES) tablet 0.3 mg  0.3 mg Oral TID Vidal Miramontes MD   0.3 mg at 01/15/25 0816    clopidogrel (PLAVIX) tablet 75 mg  75 mg Oral Daily Katherine Camarena APRN   75 mg at 01/15/25 0815    docusate sodium (COLACE) capsule 100 mg  100 mg Oral TID Katherine Camarena APRN   100 mg at 01/15/25 0815    empagliflozin (JARDIANCE) tablet 10 mg  10 mg Oral Daily Morales Schrader MD   10 mg at 01/15/25 0816    epoetin cecile-epbx (RETACRIT) injection 10,000 Units  10,000 Units Subcutaneous Once per day on Monday Wednesday Friday Giuliano Saldana MD   10,000 Units at 01/15/25 0826    fluticasone (FLONASE) 50 MCG/ACT nasal spray 2 spray  2 spray Each Nare Daily Katherine Camarena APRN   2 spray at 01/12/25 0818    furosemide (LASIX) tablet 40 mg  40 mg Oral Daily Jagdeep Moore APRN   40 mg at 01/15/25 0815    hydrALAZINE (APRESOLINE) injection 10 mg  10 mg Intravenous Q4H PRN Vidal Miramontes MD   10 mg at 01/14/25 1236    hydrALAZINE (APRESOLINE) tablet 100 mg  100 mg Oral TID Katherine Camarena APRN   100 mg at 01/15/25 0816    levothyroxine (SYNTHROID, LEVOTHROID) tablet 75 mcg  75 mcg Oral Q AM Katherine Camarena APRN   75 mcg at 01/15/25 0602    melatonin tablet 10 mg  10 mg Oral Nightly Katherine Camarena APRN   10 mg at 01/14/25 2137    mesalamine (APRISO) 24 hr capsule 1.5 g  1.5 g Oral Daily Katherine Camarena APRN   1.5 g at 01/15/25 0815    montelukast (SINGULAIR) tablet 10 mg  10 mg Oral Nightly  Katherine Camarena, APRN   10 mg at 01/14/25 2137    NIFEdipine XL (PROCARDIA XL) 24 hr tablet 120 mg  120 mg Oral Daily Morales Schrader MD   120 mg at 01/15/25 0815    nitroglycerin (NITROSTAT) SL tablet 0.4 mg  0.4 mg Sublingual Q5 Min PRN Katherine Camarena APRN        pantoprazole (PROTONIX) EC tablet 40 mg  40 mg Oral Q AM Katherine Camarena APRN   40 mg at 01/15/25 0602    promethazine (PHENERGAN) tablet 12.5 mg  12.5 mg Oral Q6H PRN Katherine Camarena APRN        sodium bicarbonate tablet 1,300 mg  1,300 mg Oral Q12H Katherine Camarena APRN   1,300 mg at 01/15/25 0826    sodium bicarbonate tablet 650 mg  650 mg Oral Daily With Lunch Katherine Camarena APRN   650 mg at 01/14/25 1236    sodium chloride 0.9 % flush 10 mL  10 mL Intravenous Q12H Katherine Camarena APRN   10 mL at 01/15/25 0826    sodium chloride 0.9 % flush 10 mL  10 mL Intravenous PRN Katherine Camarena APRN        sodium chloride 0.9 % infusion 40 mL  40 mL Intravenous PRN Katherine Camarena APRN        tamsulosin (FLOMAX) 24 hr capsule 0.4 mg  0.4 mg Oral Nightly Katherine Camarena APRN   0.4 mg at 01/14/25 2136    valsartan (DIOVAN) tablet 320 mg  320 mg Oral Daily Katherine Camarena APRN   320 mg at 01/15/25 0815       Past Medical History:  Past Medical History:   Diagnosis Date    3-vessel CAD 8/11/2020    Allergic rhinitis     Anxiety disorder 4/27/2020    Arthritis     Asymmetrical sensorineural hearing loss 6/28/2017    Atherosclerosis of native artery of both lower extremities with intermittent claudication 7/18/2019    Avascular necrosis of femoral head, left 07/11/2020    right hip after surgery    Carotid stenosis     Chronic mucoid otitis media     Chronic rhinitis     Coronary artery disease     HEART BYPASS 2004    Crohn's disease of large intestine with other complication 7/30/2020    Chronic diarrhea Colonoscopy July 2020 revealed mild patchy scattered hemosiderin staining with inflammation more so in rectosigmoid area.   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    Iron deficiency anemia 7/14/2020    Mixed hearing loss of left ear     PAD (peripheral artery disease) 8/11/2020    Perianal abscess     Pernicious anemia 08/17/2020    took shots but never diagnosed with b12 deficiency    Personal history of alcoholism 08/11/2020    quit drinking in 2013    Prostatic hypertrophy 8/11/2020    Sensorineural hearing loss     Sepsis with acute renal failure 9/15/2020    Shortness of breath 5/27/2021    Tinnitus     Ventricular tachycardia, nonsustained 7/14/2020    Weight loss 7/11/2020       Past Surgical History:  Past Surgical History:   Procedure Laterality Date    ARTERY SURGERY  2021    right side on neck    CAROTID ENDARTERECTOMY Right 5/10/2021    Procedure: RIGHT CAROTID ENDARTERECTOMY WITH EEG;  Surgeon: Gil Pineda DO;  Location: [facility] OR ;  Service: Vascular;  Laterality: Right;    COLONOSCOPY N/A 7/2/2020    Procedure: COLONOSCOPY WITH ANESTHESIA;  Surgeon: Adrien Brewster MD;  Location: [facility] ENDOSCOPY;  Service: Gastroenterology;  Laterality: N/A;  pre op: diarrhea  post op: polyps  PCP: Joe Velasco MD    COLONOSCOPY N/A 10/13/2020    Procedure: COLONOSCOPY WITH ANESTHESIA;  Surgeon: Adrien Brewster MD;  Location: [facility] ENDOSCOPY;  Service: Gastroenterology;  Laterality: N/A;  Pre: Chronic Diarrhea, Crohn's  Post: AVM  Dr. Neftali Velasco  CO2 Inflation Used    COLONOSCOPY N/A 12/8/2023    Procedure: COLONOSCOPY WITH ANESTHESIA;  Surgeon: Adrien Brewster MD;  Location: [facility] ENDOSCOPY;  Service: Gastroenterology;  Laterality: N/A;  pre chrone's disease  post sub  optimal prep, polyp, chrone's      CORONARY ARTERY BYPASS GRAFT  2003    x3    ENDOSCOPY N/A 2021    Procedure: ESOPHAGOGASTRODUODENOSCOPY WITH ANESTHESIA;  Surgeon: Bridger Bell MD;  Location: [facility] ENDOSCOPY;  Service: Gastroenterology;  Laterality: N/A;  pre anemia;gi bleed  post  gi bleed;schatski ring  Dr. ERIC Velasco    ENDOSCOPY N/A 10/10/2023    Procedure: ESOPHAGOGASTRODUODENOSCOPY WITH ANESTHESIA;  Surgeon: Adrien Brewster MD;  Location: [facility] ENDOSCOPY;  Service: Gastroenterology;  Laterality: N/A;  preop; anemia  postop esophagitis ; R/O barretts   PCP Randall Beata    EYE SURGERY Bilateral     catorac    INCISION AND DRAINAGE PERIRECTAL ABSCESS N/A 3/3/2017    Procedure: INCISION AND DRAINAGE OF JEET ANAL ABSCESS;  Surgeon: Lynette Smith MD;  Location: [facility] OR;  Service:     INGUINAL HERNIA REPAIR Bilateral 2023    Procedure: INGUINAL HERNIA BILATERAL REPAIR LAPAROSCOPIC WITH DAVINCI ROBOT WITH MESH;  Surgeon: Tahira Rivera MD;  Location: [facility] OR;  Service: Robotics - DaVinci;  Laterality: Bilateral;    MYRINGOTOMY W/ TUBES Left 2017    06/10/2016    TONSILLECTOMY      TOTAL HIP ARTHROPLASTY Right        Family History  Family History   Problem Relation Age of Onset    Breast cancer Mother     Dementia Father     Glaucoma Father     No Known Problems Daughter     Colon polyps Neg Hx     Colon cancer Neg Hx        Social History  Social History     Socioeconomic History    Marital status:    Tobacco Use    Smoking status: Former     Current packs/day: 0.00     Average packs/day: 0.5 packs/day for 25.8 years (12.9 ttl pk-yrs)     Types: Cigarettes     Start date:      Quit date: 10/13/2013     Years since quittin.2     Passive exposure: Past    Smokeless tobacco: Never    Tobacco comments:     quit 2013   Vaping Use    Vaping status: Never Used   Substance and Sexual Activity    Alcohol use: Not Currently    Drug use: No    Sexual  activity: Not Currently     Partners: Female       Review of Systems:  History obtained from chart review and the patient  General ROS: No fever or chills  Respiratory ROS: No cough, shortness of breath, wheezing  Cardiovascular ROS: No chest pain or palpitations  Gastrointestinal ROS: No abdominal pain or melena  Genito-Urinary ROS: No dysuria or hematuria  Psych ROS: No anxiety and depression  14 point ROS reviewed with the patient and negative except as noted above and in the HPI unless unable to obtain.    Objective:  Patient Vitals for the past 24 hrs:   BP Temp Temp src Pulse Resp SpO2 Weight   01/15/25 0725 175/100 97.4 °F (36.3 °C) Oral 75 16 93 % --   01/15/25 0414 175/54 97.5 °F (36.4 °C) Oral 56 16 95 % 59.7 kg (131 lb 9.6 oz)   01/14/25 2058 169/53 98.3 °F (36.8 °C) Oral 63 16 93 % --   01/14/25 1559 153/47 98.5 °F (36.9 °C) Oral 58 18 96 % --   01/14/25 1156 (!) 187/53 98.1 °F (36.7 °C) Oral 58 18 97 % --       Intake/Output Summary (Last 24 hours) at 1/15/2025 1011  Last data filed at 1/15/2025 0725  Gross per 24 hour   Intake 360 ml   Output 2500 ml   Net -2140 ml     General: awake/alert   Chest:  clear to auscultation bilaterally without respiratory distress  CVS: regular rate and rhythm  Abdominal: soft, nontender, positive bowel sounds  Extremities: no cyanosis or edema  Skin: warm and dry without rash      Labs:  Results from last 7 days   Lab Units 01/15/25  0214 01/14/25  0419 01/13/25  0439   WBC 10*3/mm3 6.04 4.97 4.44   HEMOGLOBIN g/dL 8.9* 8.8* 9.0*   HEMATOCRIT % 27.6* 28.5* 28.7*   PLATELETS 10*3/mm3 85* 83* 78*         Results from last 7 days   Lab Units 01/15/25  0214 01/14/25 0419 01/13/25  0439   SODIUM mmol/L 139 140 140   POTASSIUM mmol/L 4.2 4.1 4.0   CHLORIDE mmol/L 103 104 106   CO2 mmol/L 22.0 22.0 22.0   BUN mg/dL 81* 77* 74*   CREATININE mg/dL 3.81* 4.00* 3.55*   CALCIUM mg/dL 8.8 8.6 8.4*   EGFR mL/min/1.73 15.7* 14.8* 17.1*   BILIRUBIN mg/dL 0.3 0.4 0.3   ALK PHOS U/L 141*  140* 124*   ALT (SGPT) U/L 8 9 9   AST (SGOT) U/L 15 16 16   GLUCOSE mg/dL 109* 108* 100*       Radiology:   Imaging Results (Last 72 Hours)       Procedure Component Value Units Date/Time    US Renal Bilateral [[id number]] Collected: 01/12/25 1641     Updated: 01/14/25 1340    Narrative:      US RENAL BILATERAL- 1/12/2025 12:40 PM     HISTORY: CKD; I50.31-Acute diastolic (congestive) heart failure;  N18.4-Chronic kidney disease, stage 4 (severe); I10-Essential (primary)  hypertension; R00.1-Bradycardia, unspecified; R05.8-Other specified  cough; R79.89-Other specified abnormal findings of blood chemistry     COMPARISON: 7/12/2021     TECHNIQUE: Multiple longitudinal and transverse realtime sonographic  images of the kidneys and urinary bladder are obtained. Images stored in  PACS [facility] institutional protocol.     FINDINGS:      Trace perihepatic ascites. Cirrhotic liver morphology. Bilateral pleural  effusions.     Right kidney measures 9.5 x 4.1 x 4.6 cm. 1.7 cm cyst without complex  features. Increased renal cortical echogenicity. No hydronephrosis.     Left kidney: 9.6 x 5.3 x 5.1 cm. Simple appearing left renal cyst  without complex features measuring up to 1.1 cm.     Bladder is trabeculated. Prostate is borderline enlarged measuring 3.6 x  3.0 x 4.9 cm.       Impression:      1. Increased bilateral renal cortical echogenicity which can be seen  with medical renal disease. No hydronephrosis.  2. Cirrhotic liver morphology with trace ascites and bilateral pleural  effusions.  3. Trabeculated bladder which may be related to chronic bladder outlet  obstruction.           This report was signed and finalized on 1/12/2025 4:45 PM by Jake Briseno.       US Renal Artery Complete [[id number]] Collected: 01/12/25 1641     Updated: 01/14/25 1340    Narrative:      US RENAL BILATERAL- 1/12/2025 12:40 PM     HISTORY: CKD; I50.31-Acute diastolic (congestive) heart failure;  N18.4-Chronic kidney disease, stage 4  (severe); I10-Essential (primary)  hypertension; R00.1-Bradycardia, unspecified; R05.8-Other specified  cough; R79.89-Other specified abnormal findings of blood chemistry     COMPARISON: 7/12/2021     TECHNIQUE: Multiple longitudinal and transverse realtime sonographic  images of the kidneys and urinary bladder are obtained. [facility].     FINDINGS:      Trace perihepatic ascites. Cirrhotic liver morphology. Bilateral pleural  effusions.     Right kidney measures 9.5 x 4.1 x 4.6 cm. 1.7 cm cyst without complex  features. Increased renal cortical echogenicity. No hydronephrosis.     Left kidney: 9.6 x 5.3 x 5.1 cm. Simple appearing left renal cyst  without complex features measuring up to 1.1 cm.     Bladder is trabeculated. Prostate is borderline enlarged measuring 3.6 x  3.0 x 4.9 cm.       Impression:      1. Increased bilateral renal cortical echogenicity which can be seen  with medical renal disease. No hydronephrosis.  2. Cirrhotic liver morphology with trace ascites and bilateral pleural  effusions.  3. Trabeculated bladder which may be related to chronic bladder outlet  obstruction.           This report was signed and finalized on 1/12/2025 4:45 PM by Jake Briseno.               Culture:  Blood Culture   Date Value Ref Range Status   01/10/2025 No growth at 2 days  Preliminary   01/10/2025 No growth at 2 days  Preliminary         Assessment    Chronic kidney disease stage 4  Acute on chronic diastolic CHF  Hypertension  Crohn's disease  Metabolic acidosis--improved  Anemia of CKD    Plan:   Renal function a littler better today  Continue to titrate HTN medications  Reviewed 1/14 renal US, no mention in radiology report of renal artery evaluation?  Monitor labs      Slava Tate, APRN  1/15/2025  10:11 CST

## 2025-01-15 NOTE — PROGRESS NOTES
[facility] -  Progress Note     LOS: 5 days   Patient Care Team:  Nathan Zimmer MD as PCP - General (Internal Medicine)  Adrien Brewster MD as Consulting Physician (Gastroenterology)  Eleuterio Farley MD (Inactive) as Cardiologist (Cardiology)  Elena Jno APRN as Referring Physician (Vascular Surgery)  Bolivar Rueda MD as Consulting Physician (Nephrology)  Slava Tate APRN as Nurse Practitioner (Family Medicine)  New Omalley MD as Consulting Physician (Pulmonary Disease)  Becky Camacho APRN as Nurse Practitioner (Hematology and Oncology)  Gil Pineda DO as Consulting Physician (Vascular Surgery)    Chief Complaint:Shortness of Breath    Subjective     Interval History:   Overall stable. Breathing is better. Urine output improved. Blood remains poorly controlled.     Review of Systems:     Review of Systems   Constitutional:  Positive for fatigue and unexpected weight change.   Respiratory:  Negative for shortness of breath.      Objective     Vital Sign Min/Max for last 24 hours  Temp  Min: 97.4 °F (36.3 °C)  Max: 98.5 °F (36.9 °C)   BP  Min: 153/47  Max: 196/57   Pulse  Min: 56  Max: 75   Resp  Min: 16  Max: 18   SpO2  Min: 93 %  Max: 97 %   No data recorded   Weight  Min: 59.7 kg (131 lb 9.6 oz)  Max: 59.7 kg (131 lb 9.6 oz)         01/15/25  0414   Weight: 59.7 kg (131 lb 9.6 oz)       Telemetry: Sinus Bradycardia 57-72      Physical Exam:    Constitutional:       Appearance: Underweight. Frail. Chronically ill-appearing.      Interventions: Face mask in place.      Comments: frail   Eyes:      Pupils: Pupils are equal, round, and reactive to light.   HENT:      Head: Normocephalic and atraumatic.    Mouth/Throat:      Pharynx: Oropharynx is clear.   Neck:      Vascular: No carotid bruit or JVD.   Pulmonary:      Effort: Pulmonary effort is normal.      Breath sounds: Decreased breath sounds present.   Cardiovascular:       Normal rate. Regular rhythm.      Murmurs: There is no murmur.   Pulses:     Intact distal pulses.   Edema:     Peripheral edema absent.   Abdominal:      General: Bowel sounds are normal.      Palpations: Abdomen is soft.   Musculoskeletal: Normal range of motion.      Cervical back: Normal range of motion and neck supple. Skin:     General: Skin is warm and dry.   Neurological:      Mental Status: Alert, oriented to person, place, and time and oriented to person, place and time.      Deep Tendon Reflexes: Reflexes are normal and symmetric.   Psychiatric:         Behavior: Behavior normal.         Thought Content: Thought content normal.         Judgment: Judgment normal.       Results Review:   Lab Results (last 24 hours)       Procedure Component Value Units Date/Time    Blood Gas, Arterial - [[id number]]  (Abnormal) Collected: 01/15/25 1039    Specimen: Arterial Blood Updated: 01/15/25 1038     Site Right Brachial     Héctor's Test N/A     pH, Arterial 7.422 pH units      pCO2, Arterial 39.0 mm Hg      pO2, Arterial 78.9 mm Hg      Comment: 84 Value below reference range        HCO3, Arterial 25.4 mmol/L      Base Excess, Arterial 0.9 mmol/L      O2 Saturation, Arterial 96.3 %      Temperature 37.0     Barometric Pressure for Blood Gas 767 mmHg      Modality Room Air     Ventilator Mode NA     Collected by [id number]     Comment: Meter: [id number]     :  Whitley Chapin, ASIA        pCO2, Temperature Corrected 39.0 mm Hg      pH, Temp Corrected 7.422 pH Units      pO2, Temperature Corrected 78.9 mm Hg     Comprehensive Metabolic Panel [[id number]]  (Abnormal) Collected: 01/15/25 0214    Specimen: Blood Updated: 01/15/25 0327     Glucose 109 mg/dL      BUN 81 mg/dL      Creatinine 3.81 mg/dL      Sodium 139 mmol/L      Potassium 4.2 mmol/L      Chloride 103 mmol/L      CO2 22.0 mmol/L      Calcium 8.8 mg/dL      Total Protein 5.3 g/dL      Albumin 3.1 g/dL      ALT (SGPT) 8 U/L      AST (SGOT) 15 U/L       Alkaline Phosphatase 141 U/L      Total Bilirubin 0.3 mg/dL      Globulin 2.2 gm/dL      A/G Ratio 1.4 g/dL      BUN/Creatinine Ratio 21.3     Anion Gap 14.0 mmol/L      eGFR 15.7 mL/min/1.73     Narrative:      GFR Categories in Chronic Kidney Disease (CKD)      GFR Category          GFR (mL/min/1.73)    Interpretation  G1                     90 or greater         Normal or high (1)  G2                      60-89                Mild decrease (1)  G3a                   45-59                Mild to moderate decrease  G3b                   30-44                Moderate to severe decrease  G4                    15-29                Severe decrease  G5                    14 or less           Kidney failure          (1)In the absence of evidence of kidney disease, neither GFR category G1 or G2 fulfill the criteria for CKD.    eGFR calculation 2021 CKD-EPI creatinine equation, which does not include race as a factor    CBC & Differential [[id number]]  (Abnormal) Collected: 01/15/25 0214    Specimen: Blood Updated: 01/15/25 0325    Narrative:      The following orders were created for panel order CBC & Differential.  Procedure                               Abnormality         Status                     ---------                               -----------         ------                     CBC Auto Differential[[id number]]        Abnormal            Final result                 Please view results for these tests on the individual orders.    CBC Auto Differential [[id number]]  (Abnormal) Collected: 01/15/25 0214    Specimen: Blood Updated: 01/15/25 0325     WBC 6.04 10*3/mm3      RBC 2.74 10*6/mm3      Hemoglobin 8.9 g/dL      Hematocrit 27.6 %      .7 fL      MCH 32.5 pg      MCHC 32.2 g/dL      RDW 15.7 %      RDW-SD 58.4 fl      MPV 9.6 fL      Platelets 85 10*3/mm3     Manual Differential [[id number]]  (Abnormal) Collected: 01/15/25 0214    Specimen: Blood Updated: 01/15/25 0325     Neutrophil % 81.6 %       Lymphocyte % 3.1 %      Monocyte % 4.1 %      Eosinophil % 7.1 %      Basophil % 2.0 %      Atypical Lymphocyte % 2.0 %      Neutrophils Absolute 4.93 10*3/mm3      Lymphocytes Absolute 0.31 10*3/mm3      Monocytes Absolute 0.25 10*3/mm3      Eosinophils Absolute 0.43 10*3/mm3      Basophils Absolute 0.12 10*3/mm3      Anisocytosis Slight/1+     Ovalocytes Slight/1+     Poikilocytes Slight/1+     Polychromasia Slight/1+     WBC Morphology Normal     Platelet Estimate Decreased    Blood Culture - Blood, Arm, Right [[id number]]  (Normal) Collected: 01/10/25 1508    Specimen: Blood from Arm, Right Updated: 01/14/25 1531     Blood Culture No growth at 4 days    Blood Culture - Blood, Arm, Right [[id number]]  (Normal) Collected: 01/10/25 1440    Specimen: Blood from Arm, Right Updated: 01/14/25 1500     Blood Culture No growth at 4 days    Cortisol [[id number]] Collected: 01/14/25 0419    Specimen: Blood Updated: 01/14/25 1144     Cortisol 12.80 mcg/dL     Narrative:      Cortisol Reference Ranges:    Cortisol 6AM - 10AM Range: 6.02-18.40 mcg/dl  Cortisol 4PM - 8PM Range: 2.68-10.50 mcg/dl      Results may be falsely increased if patient taking Biotin.                  Results from last 7 days   Lab Units 01/15/25  0214 01/14/25  0419 01/13/25  0439   SODIUM mmol/L 139 140 140   POTASSIUM mmol/L 4.2 4.1 4.0   CHLORIDE mmol/L 103 104 106   CO2 mmol/L 22.0 22.0 22.0   BUN mg/dL 81* 77* 74*   CREATININE mg/dL 3.81* 4.00* 3.55*   GLUCOSE mg/dL 109* 108* 100*   CALCIUM mg/dL 8.8 8.6 8.4*         Echo EF Estimated  Results for orders placed during the hospital encounter of 01/10/25    Adult Transthoracic Echo Complete w/ Color, Spectral and Contrast if Necessary Per Protocol    Interpretation Summary    Left ventricular systolic function is normal. Left ventricular ejection fraction appears to be 56 - 60%.    Left ventricular wall thickness is consistent with mild septal asymmetric hypertrophy.    Left ventricular diastolic  function is consistent with (grade II w/high LAP) pseudonormalization.    Normal right ventricular cavity size and systolic function noted.    The left atrial cavity is mild to moderately dilated.    The right atrial cavity is dilated.    Mild aortic valve stenosis is present.    Mild to moderate mitral valve regurgitation is present.    Moderate to severe tricuspid valve regurgitation is present.    Estimated right ventricular systolic pressure from tricuspid regurgitation is markedly elevated (>55 mmHg).       Medication Review: yes  Current Facility-Administered Medications   Medication Dose Route Frequency Provider Last Rate Last Admin    acetaminophen (TYLENOL) tablet 650 mg  650 mg Oral Q4H PRN Katherine Camarena APRN        Or    acetaminophen (TYLENOL) 160 MG/5ML oral solution 650 mg  650 mg Oral Q4H PRN aKtherine Camarena APRN        Or    acetaminophen (TYLENOL) suppository 650 mg  650 mg Rectal Q4H PRN Katherine Camarena APRN        [START ON 1/16/2025] amLODIPine (NORVASC) tablet 5 mg  5 mg Oral Q24H Jagdeep Moore APRN        aspirin EC tablet 81 mg  81 mg Oral Daily Katherine Camarena APRN   81 mg at 01/15/25 0815    atorvastatin (LIPITOR) tablet 10 mg  10 mg Oral Daily Katherine Camarena APRN   10 mg at 01/15/25 0815    sennosides-docusate (PERICOLACE) 8.6-50 MG per tablet 2 tablet  2 tablet Oral BID PRN Katherine Camarena APRN        And    polyethylene glycol (MIRALAX) packet 17 g  17 g Oral Daily PRN Katherine Camarena APRN        And    bisacodyl (DULCOLAX) EC tablet 5 mg  5 mg Oral Daily PRN Katherine Camarena APRN        And    bisacodyl (DULCOLAX) suppository 10 mg  10 mg Rectal Daily PRN Katherine Camarena APRN        calcitriol (ROCALTROL) capsule 0.5 mcg  0.5 mcg Oral Daily Giuliano Saldana MD   0.5 mcg at 01/15/25 0815    [START ON 1/16/2025] carvedilol (COREG) tablet 6.25 mg  6.25 mg Oral BID With Meals Jagdeep Moore APRN        cetirizine (zyrTEC) tablet 10 mg  10 mg Oral Daily  Katherine Camarena APRN   10 mg at 01/15/25 0815    cholestyramine (QUESTRAN) packet 1 packet  1 packet Oral Daily Katherine Camarena APRN   1 packet at 01/15/25 0816    cloNIDine (CATAPRES) tablet 0.3 mg  0.3 mg Oral TID Vidal Miramontes MD   0.3 mg at 01/15/25 0816    [START ON 1/16/2025] cloNIDine (CATAPRES-TTS) 0.2 MG/24HR patch 1 patch  1 patch Transdermal Weekly Jamie Pineda MD        clopidogrel (PLAVIX) tablet 75 mg  75 mg Oral Daily Katherine Camarena APRN   75 mg at 01/15/25 0815    docusate sodium (COLACE) capsule 100 mg  100 mg Oral TID Katherine Camarena APRN   100 mg at 01/15/25 0815    empagliflozin (JARDIANCE) tablet 10 mg  10 mg Oral Daily Morales Schrader MD   10 mg at 01/15/25 0816    epoetin cecile-epbx (RETACRIT) injection 10,000 Units  10,000 Units Subcutaneous Once per day on Monday Wednesday Friday Giuliano Saldana MD   10,000 Units at 01/15/25 0826    fluticasone (FLONASE) 50 MCG/ACT nasal spray 2 spray  2 spray Each Nare Daily Katherine Camarena APRN   2 spray at 01/12/25 0818    furosemide (LASIX) tablet 40 mg  40 mg Oral Daily Jagdeep Moore APRN   40 mg at 01/15/25 0815    hydrALAZINE (APRESOLINE) injection 10 mg  10 mg Intravenous Q4H PRN Vidal Miramontes MD   10 mg at 01/14/25 1236    hydrALAZINE (APRESOLINE) tablet 100 mg  100 mg Oral TID Katherine Camarena APRN   100 mg at 01/15/25 0816    isosorbide dinitrate (ISORDIL) tablet 10 mg  10 mg Oral TID - Nitrates Jagdeep Moore APRN        levothyroxine (SYNTHROID, LEVOTHROID) tablet 75 mcg  75 mcg Oral Q AM Katherine Camarena APRN   75 mcg at 01/15/25 0602    melatonin tablet 10 mg  10 mg Oral Nightly Katherine Camarena APRN   10 mg at 01/14/25 2137    mesalamine (APRISO) 24 hr capsule 1.5 g  1.5 g Oral Daily Katherine Camarena APRN   1.5 g at 01/15/25 0815    montelukast (SINGULAIR) tablet 10 mg  10 mg Oral Nightly Katherine Camarena APRN   10 mg at 01/14/25 2137    nitroglycerin (NITROSTAT) SL tablet 0.4 mg  0.4 mg  Sublingual Q5 Min PRN Katherine Camarena, APRSHERITA        pantoprazole (PROTONIX) EC tablet 40 mg  40 mg Oral Q AM Katherine Camarena APRN   40 mg at 01/15/25 0602    promethazine (PHENERGAN) tablet 12.5 mg  12.5 mg Oral Q6H PRN Katherine Camarena, APRN        sodium bicarbonate tablet 1,300 mg  1,300 mg Oral Q12H Katherine Camarena APRN   1,300 mg at 01/15/25 0826    sodium bicarbonate tablet 650 mg  650 mg Oral Daily With Lunch Katherine Camarena APRN   650 mg at 01/14/25 1236    sodium chloride 0.9 % flush 10 mL  10 mL Intravenous Q12H Katherine Camarena APRN   10 mL at 01/15/25 0826    sodium chloride 0.9 % flush 10 mL  10 mL Intravenous PRN Katherine Camarena APRN        sodium chloride 0.9 % infusion 40 mL  40 mL Intravenous PRN Katherine Camarena APRN        tamsulosin (FLOMAX) 24 hr capsule 0.4 mg  0.4 mg Oral Nightly Katherine Camarena APRN   0.4 mg at 01/14/25 2136    valsartan (DIOVAN) tablet 320 mg  320 mg Oral Daily Katherine Camarena APRN   320 mg at 01/15/25 0815         Assessment & Plan       Acute on chronic diastolic congestive heart failure    Essential hypertension    CKD (chronic kidney disease) stage 4, GFR 15-29 ml/min    Acute respiratory failure with hypoxia    Bradycardia    History of pneumonia, current treatment with cefdinir    Abnormal TSH    Plan:  Acute on Chronic Diastolic Congestive Heart Failure- continues to appear euvolemic. Renal function some improved. Agree with transitioning back to PO diuretics. Aldactone discontinued. Continue Jardiance and Valsartan for now. Will start Isordil. Nephrology following. Low Salt Diet and Daily weights.     Hypertension- blood pressure continues to run significantly high. Will add Isordil. Will plan for Coreg low dose in the am. Will add Norvasc and Coreg in am. Continue Valsartan 320 daily.  Discontinue Nifedipine 120 daily. Hydrazine 100 mg TID, Clonidine 0.3 mg TID and Clonidine patch- these are all home doses. Renal artery ultrasound  inconclusive. Plans to change clonidine patch out tomorrow- as he changes on Thursdays.     Chronic Kidney Disease- AMAYA on chronic kidney disease. Appreciate Nephrology input. Hold IV diuretics.     Bilateral Pleural Effusions- breathing has improved. Decreased bases.     Bradycardia- Coreg held on admission. Bradycardia stable. Stop Nifedipine. Start lower dose Coreg and Norvasc in Am.     Electronically signed by STERLING Finnegan, 01/14/25, 1:08 PM CST.

## 2025-01-16 ENCOUNTER — READMISSION MANAGEMENT (OUTPATIENT)
Dept: CALL CENTER | Facility: HOSPITAL | Age: 75
End: 2025-01-16
Payer: MEDICARE

## 2025-01-16 ENCOUNTER — TELEPHONE (OUTPATIENT)
Dept: CARDIOLOGY | Facility: HOSPITAL | Age: 75
End: 2025-01-16

## 2025-01-16 VITALS
DIASTOLIC BLOOD PRESSURE: 62 MMHG | HEART RATE: 64 BPM | BODY MASS INDEX: 17.9 KG/M2 | SYSTOLIC BLOOD PRESSURE: 168 MMHG | HEIGHT: 72 IN | OXYGEN SATURATION: 98 % | TEMPERATURE: 98 F | WEIGHT: 132.2 LBS | RESPIRATION RATE: 16 BRPM

## 2025-01-16 LAB
ALBUMIN SERPL-MCNC: 3 G/DL (ref 3.5–5.2)
ALBUMIN/GLOB SERPL: 1.4 G/DL
ALDOST SERPL-MCNC: 3.8 NG/DL (ref 0–30)
ALP SERPL-CCNC: 142 U/L (ref 39–117)
ALT SERPL W P-5'-P-CCNC: 8 U/L (ref 1–41)
ANION GAP SERPL CALCULATED.3IONS-SCNC: 13 MMOL/L (ref 5–15)
AST SERPL-CCNC: 15 U/L (ref 1–40)
BASOPHILS # BLD AUTO: 0.07 10*3/MM3 (ref 0–0.2)
BASOPHILS NFR BLD AUTO: 1.3 % (ref 0–1.5)
BILIRUB SERPL-MCNC: 0.4 MG/DL (ref 0–1.2)
BUN SERPL-MCNC: 79 MG/DL (ref 8–23)
BUN/CREAT SERPL: 19.8 (ref 7–25)
CALCIUM SPEC-SCNC: 8.7 MG/DL (ref 8.6–10.5)
CHLORIDE SERPL-SCNC: 105 MMOL/L (ref 98–107)
CO2 SERPL-SCNC: 22 MMOL/L (ref 22–29)
CREAT SERPL-MCNC: 3.98 MG/DL (ref 0.76–1.27)
DEPRECATED RDW RBC AUTO: 60 FL (ref 37–54)
EGFRCR SERPLBLD CKD-EPI 2021: 14.9 ML/MIN/1.73
EOSINOPHIL # BLD AUTO: 0.29 10*3/MM3 (ref 0–0.4)
EOSINOPHIL NFR BLD AUTO: 5.4 % (ref 0.3–6.2)
ERYTHROCYTE [DISTWIDTH] IN BLOOD BY AUTOMATED COUNT: 15.9 % (ref 12.3–15.4)
GLOBULIN UR ELPH-MCNC: 2.2 GM/DL
GLUCOSE SERPL-MCNC: 108 MG/DL (ref 65–99)
HCT VFR BLD AUTO: 26.2 % (ref 37.5–51)
HGB BLD-MCNC: 8.2 G/DL (ref 13–17.7)
IMM GRANULOCYTES # BLD AUTO: 0.04 10*3/MM3 (ref 0–0.05)
IMM GRANULOCYTES NFR BLD AUTO: 0.7 % (ref 0–0.5)
LYMPHOCYTES # BLD AUTO: 1.09 10*3/MM3 (ref 0.7–3.1)
LYMPHOCYTES NFR BLD AUTO: 20.2 % (ref 19.6–45.3)
MCH RBC QN AUTO: 32.3 PG (ref 26.6–33)
MCHC RBC AUTO-ENTMCNC: 31.3 G/DL (ref 31.5–35.7)
MCV RBC AUTO: 103.1 FL (ref 79–97)
METANEPH FREE SERPL-MCNC: 54.2 PG/ML (ref 0–88)
MONOCYTES # BLD AUTO: 0.63 10*3/MM3 (ref 0.1–0.9)
MONOCYTES NFR BLD AUTO: 11.7 % (ref 5–12)
NEUTROPHILS NFR BLD AUTO: 3.28 10*3/MM3 (ref 1.7–7)
NEUTROPHILS NFR BLD AUTO: 60.7 % (ref 42.7–76)
NORMETANEPHRINE SERPL-MCNC: 209.6 PG/ML (ref 0–285.2)
NRBC BLD AUTO-RTO: 0 /100 WBC (ref 0–0.2)
PLATELET # BLD AUTO: 81 10*3/MM3 (ref 140–450)
PMV BLD AUTO: 10.4 FL (ref 6–12)
POTASSIUM SERPL-SCNC: 4 MMOL/L (ref 3.5–5.2)
PROT SERPL-MCNC: 5.2 G/DL (ref 6–8.5)
RBC # BLD AUTO: 2.54 10*6/MM3 (ref 4.14–5.8)
SODIUM SERPL-SCNC: 140 MMOL/L (ref 136–145)
WBC NRBC COR # BLD AUTO: 5.4 10*3/MM3 (ref 3.4–10.8)

## 2025-01-16 PROCEDURE — 80053 COMPREHEN METABOLIC PANEL: CPT | Performed by: INTERNAL MEDICINE

## 2025-01-16 PROCEDURE — 85025 COMPLETE CBC W/AUTO DIFF WBC: CPT | Performed by: INTERNAL MEDICINE

## 2025-01-16 RX ORDER — ISOSORBIDE DINITRATE 10 MG/1
10 TABLET ORAL
Qty: 90 TABLET | Refills: 0 | Status: SHIPPED | OUTPATIENT
Start: 2025-01-16 | End: 2025-01-28 | Stop reason: SDUPTHER

## 2025-01-16 RX ORDER — NIFEDIPINE 60 MG/1
120 TABLET, EXTENDED RELEASE ORAL
Status: DISCONTINUED | OUTPATIENT
Start: 2025-01-16 | End: 2025-01-16 | Stop reason: HOSPADM

## 2025-01-16 RX ADMIN — ATORVASTATIN CALCIUM 10 MG: 10 TABLET, FILM COATED ORAL at 08:36

## 2025-01-16 RX ADMIN — DOCUSATE SODIUM 100 MG: 100 CAPSULE, LIQUID FILLED ORAL at 08:37

## 2025-01-16 RX ADMIN — SODIUM BICARBONATE 1300 MG: 650 TABLET ORAL at 08:36

## 2025-01-16 RX ADMIN — ISOSORBIDE DINITRATE 10 MG: 10 TABLET ORAL at 08:36

## 2025-01-16 RX ADMIN — NIFEDIPINE 120 MG: 60 TABLET, FILM COATED, EXTENDED RELEASE ORAL at 10:12

## 2025-01-16 RX ADMIN — SODIUM BICARBONATE 650 MG: 650 TABLET ORAL at 12:53

## 2025-01-16 RX ADMIN — CETIRIZINE HYDROCHLORIDE TABLETS 10 MG: 10 TABLET, FILM COATED ORAL at 08:37

## 2025-01-16 RX ADMIN — VALSARTAN 320 MG: 80 TABLET, FILM COATED ORAL at 08:37

## 2025-01-16 RX ADMIN — CHOLESTYRAMINE 1 PACKET: 4 POWDER, FOR SUSPENSION ORAL at 10:12

## 2025-01-16 RX ADMIN — CLONIDINE HYDROCHLORIDE 0.3 MG: 0.1 TABLET ORAL at 08:36

## 2025-01-16 RX ADMIN — CLOPIDOGREL BISULFATE 75 MG: 75 TABLET ORAL at 08:36

## 2025-01-16 RX ADMIN — PANTOPRAZOLE SODIUM 40 MG: 40 TABLET, DELAYED RELEASE ORAL at 05:28

## 2025-01-16 RX ADMIN — HYDRALAZINE HYDROCHLORIDE 100 MG: 50 TABLET ORAL at 08:36

## 2025-01-16 RX ADMIN — CALCITRIOL CAPSULES 0.25 MCG 0.5 MCG: 0.25 CAPSULE ORAL at 08:36

## 2025-01-16 RX ADMIN — ISOSORBIDE DINITRATE 10 MG: 10 TABLET ORAL at 12:53

## 2025-01-16 RX ADMIN — EMPAGLIFLOZIN 10 MG: 10 TABLET, FILM COATED ORAL at 08:36

## 2025-01-16 RX ADMIN — MESALAMINE 1.5 G: 375 CAPSULE, EXTENDED RELEASE ORAL at 08:36

## 2025-01-16 RX ADMIN — LEVOTHYROXINE SODIUM 75 MCG: 75 TABLET ORAL at 05:28

## 2025-01-16 RX ADMIN — FUROSEMIDE 40 MG: 40 TABLET ORAL at 08:36

## 2025-01-16 RX ADMIN — Medication 10 ML: at 08:38

## 2025-01-16 RX ADMIN — ASPIRIN 81 MG: 81 TABLET, COATED ORAL at 08:37

## 2025-01-16 RX ADMIN — CLONIDINE 1 PATCH: 0.2 PATCH TRANSDERMAL at 08:38

## 2025-01-16 NOTE — PROGRESS NOTES
[facility] -  Progress Note     LOS: 6 days   Patient Care Team:  Nathan Zimmer MD as PCP - General (Internal Medicine)  Adrien Brewster MD as Consulting Physician (Gastroenterology)  Eleuterio Farley MD (Inactive) as Cardiologist (Cardiology)  Elena Jon APRN as Referring Physician (Vascular Surgery)  Bolivar Rueda MD as Consulting Physician (Nephrology)  Slava Tate APRN as Nurse Practitioner (Family Medicine)  New Omalley MD as Consulting Physician (Pulmonary Disease)  Becky Camacho APRN as Nurse Practitioner (Hematology and Oncology)  Gil Pineda DO as Consulting Physician (Vascular Surgery)    Chief Complaint:Shortness of Breath    Subjective     Interval History:   Stable. Blood pressure has improved. Shortness of breath better. Still weak. He notes he has been up and ambulatory to bathroom and done okay. Plans to DC home today.     Review of Systems:     Review of Systems   Constitutional:  Positive for fatigue and unexpected weight change.   Respiratory:  Negative for shortness of breath.      Objective     Vital Sign Min/Max for last 24 hours  Temp  Min: 97.7 °F (36.5 °C)  Max: 98.4 °F (36.9 °C)   BP  Min: 123/45  Max: 208/62   Pulse  Min: 51  Max: 66   Resp  Min: 16  Max: 18   SpO2  Min: 94 %  Max: 97 %   No data recorded   Weight  Min: 60 kg (132 lb 3.2 oz)  Max: 60 kg (132 lb 3.2 oz)         01/16/25  0316   Weight: 60 kg (132 lb 3.2 oz)       Telemetry: Sinus Bradycardia 52-74      Physical Exam:    Constitutional:       Appearance: Underweight. Frail. Chronically ill-appearing.      Interventions: Face mask in place.      Comments: frail   Eyes:      Pupils: Pupils are equal, round, and reactive to light.   HENT:      Head: Normocephalic and atraumatic.    Mouth/Throat:      Pharynx: Oropharynx is clear.   Neck:      Vascular: No carotid bruit or JVD.   Pulmonary:      Effort: Pulmonary effort is normal.       Breath sounds: Decreased breath sounds present.   Cardiovascular:      Normal rate. Regular rhythm.      Murmurs: There is no murmur.   Pulses:     Intact distal pulses.   Edema:     Peripheral edema absent.   Abdominal:      General: Bowel sounds are normal.      Palpations: Abdomen is soft.   Musculoskeletal: Normal range of motion.      Cervical back: Normal range of motion and neck supple. Skin:     General: Skin is warm and dry.   Neurological:      Mental Status: Alert, oriented to person, place, and time and oriented to person, place and time.      Deep Tendon Reflexes: Reflexes are normal and symmetric.   Psychiatric:         Behavior: Behavior normal.         Thought Content: Thought content normal.         Judgment: Judgment normal.       Results Review:   Lab Results (last 24 hours)       Procedure Component Value Units Date/Time    Metanephrines, Frac. Free, Plasma [[id number]] Collected: 01/13/25 1308    Specimen: Blood Updated: 01/16/25 0910     Normetanephrine 209.6 pg/mL      Metanephrine 54.2 pg/mL     Narrative:      Test(s) 117006-Xxnyuntdclfqlgl, Pl; 358896-Yqzditmrodld, Pl  was developed and its performance characteristics determined  by [employer]. It has not been cleared or approved by the Food  and Drug Administration.  Performed at:  [address] [place] [address]  [id number]  : Germán Joseph MD, Phone:  [phone]    Comprehensive Metabolic Panel [[id number]]  (Abnormal) Collected: 01/16/25 0225    Specimen: Blood Updated: 01/16/25 0325     Glucose 108 mg/dL      BUN 79 mg/dL      Creatinine 3.98 mg/dL      Sodium 140 mmol/L      Potassium 4.0 mmol/L      Chloride 105 mmol/L      CO2 22.0 mmol/L      Calcium 8.7 mg/dL      Total Protein 5.2 g/dL      Albumin 3.0 g/dL      ALT (SGPT) 8 U/L      AST (SGOT) 15 U/L      Alkaline Phosphatase 142 U/L      Total Bilirubin 0.4 mg/dL      Globulin 2.2 gm/dL      A/G Ratio 1.4 g/dL      BUN/Creatinine Ratio 19.8      Anion Gap 13.0 mmol/L      eGFR 14.9 mL/min/1.73     Narrative:      GFR Categories in Chronic Kidney Disease (CKD)      GFR Category          GFR (mL/min/1.73)    Interpretation  G1                     90 or greater         Normal or high (1)  G2                      60-89                Mild decrease (1)  G3a                   45-59                Mild to moderate decrease  G3b                   30-44                Moderate to severe decrease  G4                    15-29                Severe decrease  G5                    14 or less           Kidney failure          (1)In the absence of evidence of kidney disease, neither GFR category G1 or G2 fulfill the criteria for CKD.    eGFR calculation 2021 CKD-EPI creatinine equation, which does not include race as a factor    CBC & Differential [[id number]]  (Abnormal) Collected: 01/16/25 0225    Specimen: Blood Updated: 01/16/25 0314    Narrative:      The following orders were created for panel order CBC & Differential.  Procedure                               Abnormality         Status                     ---------                               -----------         ------                     CBC Auto Differential[[id number]]        Abnormal            Final result                 Please view results for these tests on the individual orders.    CBC Auto Differential [[id number]]  (Abnormal) Collected: 01/16/25 0225    Specimen: Blood Updated: 01/16/25 0314     WBC 5.40 10*3/mm3      RBC 2.54 10*6/mm3      Hemoglobin 8.2 g/dL      Hematocrit 26.2 %      .1 fL      MCH 32.3 pg      MCHC 31.3 g/dL      RDW 15.9 %      RDW-SD 60.0 fl      MPV 10.4 fL      Platelets 81 10*3/mm3      Neutrophil % 60.7 %      Lymphocyte % 20.2 %      Monocyte % 11.7 %      Eosinophil % 5.4 %      Basophil % 1.3 %      Immature Grans % 0.7 %      Neutrophils, Absolute 3.28 10*3/mm3      Lymphocytes, Absolute 1.09 10*3/mm3      Monocytes, Absolute 0.63 10*3/mm3      Eosinophils, Absolute  0.29 10*3/mm3      Basophils, Absolute 0.07 10*3/mm3      Immature Grans, Absolute 0.04 10*3/mm3      nRBC 0.0 /100 WBC     Blood Culture - Blood, Arm, Right [[id number]]  (Normal) Collected: 01/10/25 1508    Specimen: Blood from Arm, Right Updated: 01/15/25 1531     Blood Culture No growth at 5 days    Blood Culture - Blood, Arm, Right [[id number]]  (Normal) Collected: 01/10/25 1440    Specimen: Blood from Arm, Right Updated: 01/15/25 1500     Blood Culture No growth at 5 days                Results from last 7 days   Lab Units 01/16/25  0225 01/15/25  0214 01/14/25  0419   SODIUM mmol/L 140 139 140   POTASSIUM mmol/L 4.0 4.2 4.1   CHLORIDE mmol/L 105 103 104   CO2 mmol/L 22.0 22.0 22.0   BUN mg/dL 79* 81* 77*   CREATININE mg/dL 3.98* 3.81* 4.00*   GLUCOSE mg/dL 108* 109* 108*   CALCIUM mg/dL 8.7 8.8 8.6         Echo EF Estimated  Results for orders placed during the hospital encounter of 01/10/25    Adult Transthoracic Echo Complete w/ Color, Spectral and Contrast if Necessary Per Protocol    Interpretation Summary    Left ventricular systolic function is normal. Left ventricular ejection fraction appears to be 56 - 60%.    Left ventricular wall thickness is consistent with mild septal asymmetric hypertrophy.    Left ventricular diastolic function is consistent with (grade II w/high LAP) pseudonormalization.    Normal right ventricular cavity size and systolic function noted.    The left atrial cavity is mild to moderately dilated.    The right atrial cavity is dilated.    Mild aortic valve stenosis is present.    Mild to moderate mitral valve regurgitation is present.    Moderate to severe tricuspid valve regurgitation is present.    Estimated right ventricular systolic pressure from tricuspid regurgitation is markedly elevated (>55 mmHg).       Medication Review: yes  Current Facility-Administered Medications   Medication Dose Route Frequency Provider Last Rate Last Admin    acetaminophen (TYLENOL) tablet 650 mg   650 mg Oral Q4H PRN Katherine Camarena, APRSHERITA        Or    acetaminophen (TYLENOL) 160 MG/5ML oral solution 650 mg  650 mg Oral Q4H PRN Katherine Camarena, STERLING        Or    acetaminophen (TYLENOL) suppository 650 mg  650 mg Rectal Q4H PRN Katherine Camarena, APRN        aspirin EC tablet 81 mg  81 mg Oral Daily Katherine Camarena, APRN   81 mg at 01/16/25 0837    atorvastatin (LIPITOR) tablet 10 mg  10 mg Oral Daily Katherine Camarena APRN   10 mg at 01/16/25 0836    sennosides-docusate (PERICOLACE) 8.6-50 MG per tablet 2 tablet  2 tablet Oral BID PRN Katherine Camarena APRN        And    polyethylene glycol (MIRALAX) packet 17 g  17 g Oral Daily PRN Katherine Camarena, APRSHERITA        And    bisacodyl (DULCOLAX) EC tablet 5 mg  5 mg Oral Daily PRN Katherine Camarena APRN        And    bisacodyl (DULCOLAX) suppository 10 mg  10 mg Rectal Daily PRN Katherine Camarena, APRN        calcitriol (ROCALTROL) capsule 0.5 mcg  0.5 mcg Oral Daily Giuliano Saldana MD   0.5 mcg at 01/16/25 0836    cetirizine (zyrTEC) tablet 10 mg  10 mg Oral Daily Katherine Camarena, APRN   10 mg at 01/16/25 0837    cholestyramine (QUESTRAN) packet 1 packet  1 packet Oral Daily Katherine Camarena APRN   1 packet at 01/16/25 1012    cloNIDine (CATAPRES) tablet 0.3 mg  0.3 mg Oral TID Vidal Miramontes MD   0.3 mg at 01/16/25 0836    cloNIDine (CATAPRES-TTS) 0.2 MG/24HR patch 1 patch  1 patch Transdermal Weekly Jamie Pineda MD   1 patch at 01/16/25 0838    clopidogrel (PLAVIX) tablet 75 mg  75 mg Oral Daily Katherine Camarena, APRN   75 mg at 01/16/25 0836    docusate sodium (COLACE) capsule 100 mg  100 mg Oral TID Katherine Camarena APRN   100 mg at 01/16/25 0837    empagliflozin (JARDIANCE) tablet 10 mg  10 mg Oral Daily Morales Schrader MD   10 mg at 01/16/25 0836    epoetin cecile-epbx (RETACRIT) injection 10,000 Units  10,000 Units Subcutaneous Once per day on Monday Wednesday Friday Giuliano Saldana MD   10,000 Units at 01/15/25 0826    fluticasone  (FLONASE) 50 MCG/ACT nasal spray 2 spray  2 spray Each Nare Daily Katherine Camarena APRN   2 spray at 01/12/25 0818    furosemide (LASIX) tablet 40 mg  40 mg Oral Daily Jagdeep Moore APRN   40 mg at 01/16/25 0836    hydrALAZINE (APRESOLINE) tablet 100 mg  100 mg Oral TID Katherine Camarena APRN   100 mg at 01/16/25 0836    isosorbide dinitrate (ISORDIL) tablet 10 mg  10 mg Oral TID - Nitrates Jagdeep Moore APRN   10 mg at 01/16/25 0836    levothyroxine (SYNTHROID, LEVOTHROID) tablet 75 mcg  75 mcg Oral Q AM Katherine Camarena APRN   75 mcg at 01/16/25 0528    melatonin tablet 10 mg  10 mg Oral Nightly Katherine Camarena APRN   10 mg at 01/15/25 2148    mesalamine (APRISO) 24 hr capsule 1.5 g  1.5 g Oral Daily Katherine Camarena APRN   1.5 g at 01/16/25 0836    montelukast (SINGULAIR) tablet 10 mg  10 mg Oral Nightly Katherine Camarena APRN   10 mg at 01/15/25 2149    NIFEdipine XL (PROCARDIA XL) 24 hr tablet 120 mg  120 mg Oral Q24H Jagdeep Moore APRN   120 mg at 01/16/25 1012    nitroglycerin (NITROSTAT) SL tablet 0.4 mg  0.4 mg Sublingual Q5 Min PRN Katherine Camarena APRN        pantoprazole (PROTONIX) EC tablet 40 mg  40 mg Oral Q AM Katherine Camarena APRN   40 mg at 01/16/25 0528    promethazine (PHENERGAN) tablet 12.5 mg  12.5 mg Oral Q6H PRN Katherine Camarena APRN        sodium bicarbonate tablet 1,300 mg  1,300 mg Oral Q12H Katherine Camarena APRN   1,300 mg at 01/16/25 0836    sodium bicarbonate tablet 650 mg  650 mg Oral Daily With Lunch Katherine Camarena APRN   650 mg at 01/15/25 1258    sodium chloride 0.9 % flush 10 mL  10 mL Intravenous Q12H Katherine Camarena APRN   10 mL at 01/16/25 0838    sodium chloride 0.9 % flush 10 mL  10 mL Intravenous PRN Katherine Camarena APRN        sodium chloride 0.9 % infusion 40 mL  40 mL Intravenous PRN Katherine Camarena APRN        tamsulosin (FLOMAX) 24 hr capsule 0.4 mg  0.4 mg Oral Nightly Katherine Camarena APRN   0.4 mg at 01/15/25 7180     valsartan (DIOVAN) tablet 320 mg  320 mg Oral Daily Katherine Camarena APRN   320 mg at 01/16/25 0837         Assessment & Plan       Acute on chronic diastolic congestive heart failure    Essential hypertension    CKD (chronic kidney disease) stage 4, GFR 15-29 ml/min    Acute respiratory failure with hypoxia    Bradycardia    History of pneumonia, current treatment with cefdinir    Abnormal TSH    Plan:  Acute on Chronic Diastolic Congestive Heart Failure- remains euvolemic. Stable on PO diuretics. Jardiance and Valsartan. Aldactone was discontinued due to AMAYA. JArdiance was also a new start- need to monitor renal function. Nephrology following.  Low Salt Diet and Daily weights.     Hypertension- blood pressure significant improved after Isordil yesterday. Therefore has continued on Nifedipine. Coreg held due to hypotension.  Renal artery ultrasound inconclusive. Continue to follow closely with PCP and nephrology outpatient for management.     Chronic Kidney Disease- Appreciate Nephrology input.     Bilateral Pleural Effusions- stable    Bradycardia- Coreg held on admission. Bradycardia stable.     Has 2 close follow ups with cardiology outpatient    Electronically signed by STERLING Finnegan, 01/16/25, 10:56 AM CST.

## 2025-01-16 NOTE — PLAN OF CARE
Goal Outcome Evaluation:           Progress: improving  Outcome Evaluation: No c/o pain. BP remains elevated most of day, much better this evening. Med changes per cardiology. Possible d/c home tomorrow ??? Will continue to monitor. NSR/SB 56-73.

## 2025-01-16 NOTE — OUTREACH NOTE
Prep Survey      Flowsheet Row Responses   [facility] patient discharged from? [place]   Is LACE score < 7 ? No   Eligibility [facility]   Date of Admission 01/10/25   Date of Discharge 01/16/25   Discharge Disposition Home or Self Care   Discharge diagnosis Acute on chronic diastolic congestive heart failure   Does the patient have one of the following disease processes/diagnoses(primary or secondary)? CHF   Does the patient have Home health ordered? No   Is there a DME ordered? No   Prep survey completed? Yes            JAY MANZO - Registered Nurse

## 2025-01-16 NOTE — PROGRESS NOTES
Nephrology ([facility]) Progress Note      Patient:  Ricardo Hugo  YOB: 1950  Date of Service: 1/16/2025  MRN: [id number]   Acct: [id number]   Primary Care Physician: Nathan Zimmer MD  Advance Directive:   Code Status and Medical Interventions: CPR (Attempt to Resuscitate); Full Support   Ordered at: 01/10/25 1631     Code Status (Patient has no pulse and is not breathing):    CPR (Attempt to Resuscitate)     Medical Interventions (Patient has pulse or is breathing):    Full Support     Admit Date: 1/10/2025       Hospital Day: 6  Referring Provider: Vidal Miramontes MD      Patient personally seen and examined.  Complete chart including Consults, Notes, Operative Reports, Labs, Cardiology, and Radiology studies reviewed as able.        Subjective:  Ricardo Hugo is a 74 y.o. male for whom we were consulted for evaluation and treatment of chronic kidney disease stage 4. Baseline GFR 17, follows in our office. History of difficult to control hypertension, Crohn's disease, diastolic CHF. Presented to ER with increased dyspnea. Admitted for CHF exacerbation. Initial labs showed creatinine 3.4 which is essentially his baseline level. Denied edema or weight gain. Denied changes in urination. No n/v/d.    Today is awake and alert. No new complaint. Hopeful for discharge soon. Urine output nonoliguric    Allergies:  Ondansetron, Zofran [ondansetron hcl], Lortab [hydrocodone-acetaminophen], and Allopurinol    Home Meds:  Facility-Administered Medications Prior to Admission   Medication Dose Route Frequency Provider Last Rate Last Admin    cyanocobalamin injection 1,000 mcg  1,000 mcg Intramuscular Q28 Days Ruthie Parra, APRN   1,000 mcg at 08/11/23 1123     Medications Prior to Admission   Medication Sig Dispense Refill Last Dose/Taking    albuterol sulfate  (90 Base) MCG/ACT inhaler Inhale 2 puffs 4 (Four) Times a Day As Needed for Wheezing or Shortness of  Air.   Past Week    ALPRAZolam (XANAX) 0.25 MG tablet Take 1 tablet by mouth Every Night.   Past Week    aspirin (aspirin) 81 MG EC tablet Take 1 tablet by mouth Daily.   Past Week    atorvastatin (LIPITOR) 10 MG tablet Take 1 tablet by mouth Daily. 90 tablet 3 Past Week    Budeson-Glycopyrrol-Formoterol (Breztri Aerosphere) 160-9-4.8 MCG/ACT aerosol inhaler Inhale 2 puffs 2 (Two) Times a Day. 1 each 3 Past Week    carvedilol (COREG) 25 MG tablet Take 1 tablet by mouth 2 (Two) Times a Day With Meals. 180 tablet 3 Past Week    cefdinir (OMNICEF) 300 MG capsule Take 1 capsule by mouth Daily. 5 capsule 0 Past Week    cholestyramine light 4 g packet Take 1 packet by mouth Daily.   Past Week    cloNIDine (CATAPRES) 0.3 MG tablet Take 1 tablet by mouth 3 (Three) Times a Day. 270 tablet 2 Past Week    cloNIDine (CATAPRES-TTS) 0.2 MG/24HR patch Place 1 patch on the skin as directed by provider 1 (One) Time Per Week. THURSDAYS   Past Week    clopidogrel (PLAVIX) 75 MG tablet Take 1 tablet by mouth Daily.   Past Week    Copper Gluconate (Copper Caps) 2 MG capsule Take 2 mg by mouth Daily.   Past Week    docusate sodium (COLACE) 100 MG capsule Take 1 capsule by mouth 3 (Three) Times a Day As Needed for Constipation.   Past Week    fexofenadine (ALLEGRA) 180 MG tablet Take 1 tablet by mouth Daily.   Past Week    fluticasone (FLONASE) 50 MCG/ACT nasal spray Administer 2 sprays into the nostril(s) as directed by provider Daily.   Past Week    furosemide (Lasix) 20 MG tablet Take 1 tablet by mouth 3 (Three) Times a Day. 270 tablet 3 Past Week    hydrALAZINE (APRESOLINE) 100 MG tablet Take 1 tablet by mouth 3 (Three) Times a Day. 100mg daily   Past Week    levothyroxine (Synthroid) 75 MCG tablet Take 1 tablet by mouth Daily. 90 tablet 3 Past Week    Magnesium Cl-Calcium Carbonate (SLOW-MAG PO) Take 143 mg by mouth Daily.   Past Week    Melatonin 10 MG capsule Take 3 capsules by mouth Every Night.   Past Week    mesalamine (APRISO)  0.375 g 24 hr capsule Take 4 capsules by mouth Daily.   Past Week    montelukast (SINGULAIR) 10 MG tablet Take 1 tablet by mouth Every Night.   Past Week    Multiple Vitamins-Minerals (PRESERVISION/LUTEIN) capsule Take 1 capsule by mouth 2 (two) times a day.   Past Week    NIFEdipine XL (PROCARDIA XL) 90 MG 24 hr tablet Take 1 tablet by mouth Daily.   Past Week    omeprazole (priLOSEC) 20 MG capsule Take 1 capsule by mouth Daily.   Past Week    sodium bicarbonate 650 MG tablet Take 5 tablets by mouth Daily. 2 qam, 1 at noon, and 2 qpm   Past Week    spironolactone (ALDACTONE) 25 MG tablet Take 1 tablet by mouth Daily. 90 tablet 2 Past Week    tamsulosin (FLOMAX) 0.4 MG capsule 24 hr capsule Take 1 capsule by mouth Every Night. 90 capsule 3 Past Week    valsartan (DIOVAN) 320 MG tablet Take 1 tablet by mouth Daily.   Past Week    vitamin D (ERGOCALCIFEROL) 1.25 MG (49481 UT) capsule capsule Take 1 capsule by mouth 1 (One) Time Per Week.   Past Week    [DISCONTINUED] omeprazole (priLOSEC) 20 MG capsule TAKE 1 CAPSULE DAILY 90 capsule 1 1/10/2025       Medicines:  Current Facility-Administered Medications   Medication Dose Route Frequency Provider Last Rate Last Admin    acetaminophen (TYLENOL) tablet 650 mg  650 mg Oral Q4H PRN Katherine Camarena APRN        Or    acetaminophen (TYLENOL) 160 MG/5ML oral solution 650 mg  650 mg Oral Q4H PRN Katherine Camarnea, APRN        Or    acetaminophen (TYLENOL) suppository 650 mg  650 mg Rectal Q4H PRN Katherine Camarena, APRN        aspirin EC tablet 81 mg  81 mg Oral Daily Katherine Camarena APRN   81 mg at 01/16/25 0837    atorvastatin (LIPITOR) tablet 10 mg  10 mg Oral Daily Katherine Camarena APRN   10 mg at 01/16/25 0836    sennosides-docusate (PERICOLACE) 8.6-50 MG per tablet 2 tablet  2 tablet Oral BID PRN Katherine Camarena APRN        And    polyethylene glycol (MIRALAX) packet 17 g  17 g Oral Daily PRN Katherine Camarena APRSHERITA        And    bisacodyl (DULCOLAX) EC tablet  5 mg  5 mg Oral Daily PRN Katherine Camarena, APRSHERITA        And    bisacodyl (DULCOLAX) suppository 10 mg  10 mg Rectal Daily PRN Katherine Camarena, APRN        calcitriol (ROCALTROL) capsule 0.5 mcg  0.5 mcg Oral Daily Giuliano Saldana MD   0.5 mcg at 01/16/25 0836    cetirizine (zyrTEC) tablet 10 mg  10 mg Oral Daily Katherine Camarena APRN   10 mg at 01/16/25 0837    cholestyramine (QUESTRAN) packet 1 packet  1 packet Oral Daily Katherine Camarena, APRN   1 packet at 01/16/25 1012    cloNIDine (CATAPRES) tablet 0.3 mg  0.3 mg Oral TID Vidal Miramontes MD   0.3 mg at 01/16/25 0836    cloNIDine (CATAPRES-TTS) 0.2 MG/24HR patch 1 patch  1 patch Transdermal Weekly Jamie Pineda MD   1 patch at 01/16/25 0838    clopidogrel (PLAVIX) tablet 75 mg  75 mg Oral Daily Katherine Camarena APRN   75 mg at 01/16/25 0836    docusate sodium (COLACE) capsule 100 mg  100 mg Oral TID Katherine Camarena APRN   100 mg at 01/16/25 0837    empagliflozin (JARDIANCE) tablet 10 mg  10 mg Oral Daily Morales Schrader MD   10 mg at 01/16/25 0836    epoetin cecile-epbx (RETACRIT) injection 10,000 Units  10,000 Units Subcutaneous Once per day on Monday Wednesday Friday Giuliano Saldana MD   10,000 Units at 01/15/25 0826    fluticasone (FLONASE) 50 MCG/ACT nasal spray 2 spray  2 spray Each Nare Daily Katherine Camarena APRN   2 spray at 01/12/25 0818    furosemide (LASIX) tablet 40 mg  40 mg Oral Daily Jagdeep Moore APRN   40 mg at 01/16/25 0836    hydrALAZINE (APRESOLINE) tablet 100 mg  100 mg Oral TID Katherine Camarena APRN   100 mg at 01/16/25 0836    isosorbide dinitrate (ISORDIL) tablet 10 mg  10 mg Oral TID - Nitrates Jagdeep Moroe APRN   10 mg at 01/16/25 0836    levothyroxine (SYNTHROID, LEVOTHROID) tablet 75 mcg  75 mcg Oral Q AM Katherine Camarena APRN   75 mcg at 01/16/25 0528    melatonin tablet 10 mg  10 mg Oral Nightly Katherine Camarena APRN   10 mg at 01/15/25 2720    mesalamine (APRISO) 24 hr capsule 1.5 g  1.5 g Oral  Daily Katherine Camarena APRN   1.5 g at 01/16/25 0836    montelukast (SINGULAIR) tablet 10 mg  10 mg Oral Nightly Katherine Camarena APRN   10 mg at 01/15/25 2149    NIFEdipine XL (PROCARDIA XL) 24 hr tablet 120 mg  120 mg Oral Q24H Jagdeep Moore APRN   120 mg at 01/16/25 1012    nitroglycerin (NITROSTAT) SL tablet 0.4 mg  0.4 mg Sublingual Q5 Min PRN Katherine Camarena APRN        pantoprazole (PROTONIX) EC tablet 40 mg  40 mg Oral Q AM Katherine Camarena APRN   40 mg at 01/16/25 0528    promethazine (PHENERGAN) tablet 12.5 mg  12.5 mg Oral Q6H PRN Katherine Camarena APRN        sodium bicarbonate tablet 1,300 mg  1,300 mg Oral Q12H Katherine Camarena APRN   1,300 mg at 01/16/25 0836    sodium bicarbonate tablet 650 mg  650 mg Oral Daily With Lunch Katherine Camarena APRN   650 mg at 01/15/25 1258    sodium chloride 0.9 % flush 10 mL  10 mL Intravenous Q12H Katherine Camarena APRN   10 mL at 01/16/25 0838    sodium chloride 0.9 % flush 10 mL  10 mL Intravenous PRN Katherine Camarena APRN        sodium chloride 0.9 % infusion 40 mL  40 mL Intravenous PRN Katherine Camarnea APRN        tamsulosin (FLOMAX) 24 hr capsule 0.4 mg  0.4 mg Oral Nightly Katherine Camarena APRN   0.4 mg at 01/15/25 2149    valsartan (DIOVAN) tablet 320 mg  320 mg Oral Daily Katherine Camarena APRN   320 mg at 01/16/25 0837       Past Medical History:  Past Medical History:   Diagnosis Date    3-vessel CAD 8/11/2020    Allergic rhinitis     Anxiety disorder 4/27/2020    Arthritis     Asymmetrical sensorineural hearing loss 6/28/2017    Atherosclerosis of native artery of both lower extremities with intermittent claudication 7/18/2019    Avascular necrosis of femoral head, left 07/11/2020    right hip after surgery    Carotid stenosis     Chronic mucoid otitis media     Chronic rhinitis     Coronary artery disease     HEART BYPASS 2004    Crohn's disease of large intestine with other complication 7/30/2020    Chronic diarrhea Colonoscopy  July 2020 revealed mild patchy scattered hemosiderin staining with inflammation more so in rectosigmoid area.  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    Iron deficiency anemia 7/14/2020    Mixed hearing loss of left ear     PAD (peripheral artery disease) 8/11/2020    Perianal abscess     Pernicious anemia 08/17/2020    took shots but never diagnosed with b12 deficiency    Personal history of alcoholism 08/11/2020    quit drinking in 2013    Prostatic hypertrophy 8/11/2020    Sensorineural hearing loss     Sepsis with acute renal failure 9/15/2020    Shortness of breath 5/27/2021    Tinnitus     Ventricular tachycardia, nonsustained 7/14/2020    Weight loss 7/11/2020       Past Surgical History:  Past Surgical History:   Procedure Laterality Date    ARTERY SURGERY  2021    right side on neck    CAROTID ENDARTERECTOMY Right 5/10/2021    Procedure: RIGHT CAROTID ENDARTERECTOMY WITH EEG;  Surgeon: Gil Pineda DO;  Location: [facility] OR ;  Service: Vascular;  Laterality: Right;    COLONOSCOPY N/A 7/2/2020    Procedure: COLONOSCOPY WITH ANESTHESIA;  Surgeon: Adrien Brewster MD;  Location: [facility] ENDOSCOPY;  Service: Gastroenterology;  Laterality: N/A;  pre op: diarrhea  post op: polyps  PCP: Joe Velasco MD    COLONOSCOPY N/A 10/13/2020    Procedure: COLONOSCOPY WITH ANESTHESIA;  Surgeon: Adrien Brewster MD;  Location: [facility] ENDOSCOPY;  Service: Gastroenterology;  Laterality: N/A;  Pre: Chronic Diarrhea, Crohn's  Post: AVM  Dr. Neftali Velasco  CO2 Inflation Used    COLONOSCOPY N/A 12/8/2023    Procedure: COLONOSCOPY WITH ANESTHESIA;  Surgeon: Adrien Brewster  MD;  Location: [facility] ENDOSCOPY;  Service: Gastroenterology;  Laterality: N/A;  pre chrone's disease  post sub optimal prep, polyp, chrone's      CORONARY ARTERY BYPASS GRAFT  2003    x3    ENDOSCOPY N/A 2021    Procedure: ESOPHAGOGASTRODUODENOSCOPY WITH ANESTHESIA;  Surgeon: Bridger Bell MD;  Location: [facility] ENDOSCOPY;  Service: Gastroenterology;  Laterality: N/A;  pre anemia;gi bleed  post  gi bleed;schatski ring  Dr. ERIC Velasco    ENDOSCOPY N/A 10/10/2023    Procedure: ESOPHAGOGASTRODUODENOSCOPY WITH ANESTHESIA;  Surgeon: Adrien Brewster MD;  Location: [facility] ENDOSCOPY;  Service: Gastroenterology;  Laterality: N/A;  preop; anemia  postop esophagitis ; R/O barretts   PCP Randall Beata    EYE SURGERY Bilateral     catorac    INCISION AND DRAINAGE PERIRECTAL ABSCESS N/A 3/3/2017    Procedure: INCISION AND DRAINAGE OF JEET ANAL ABSCESS;  Surgeon: Lynette Smith MD;  Location: [facility] OR;  Service:     INGUINAL HERNIA REPAIR Bilateral 2023    Procedure: INGUINAL HERNIA BILATERAL REPAIR LAPAROSCOPIC WITH DAVINCI ROBOT WITH MESH;  Surgeon: Tahira Rivera MD;  Location: [facility] OR;  Service: Robotics - DaVinci;  Laterality: Bilateral;    MYRINGOTOMY W/ TUBES Left 2017    06/10/2016    TONSILLECTOMY      TOTAL HIP ARTHROPLASTY Right        Family History  Family History   Problem Relation Age of Onset    Breast cancer Mother     Dementia Father     Glaucoma Father     No Known Problems Daughter     Colon polyps Neg Hx     Colon cancer Neg Hx        Social History  Social History     Socioeconomic History    Marital status:    Tobacco Use    Smoking status: Former     Current packs/day: 0.00     Average packs/day: 0.5 packs/day for 25.8 years (12.9 ttl pk-yrs)     Types: Cigarettes     Start date:      Quit date: 10/13/2013     Years since quittin.2     Passive exposure: Past    Smokeless tobacco: Never    Tobacco comments:     quit 2013   Vaping Use    Vaping  status: Never Used   Substance and Sexual Activity    Alcohol use: Not Currently    Drug use: No    Sexual activity: Not Currently     Partners: Female       Review of Systems:  History obtained from chart review and the patient  General ROS: No fever or chills  Respiratory ROS: No cough, shortness of breath, wheezing  Cardiovascular ROS: No chest pain or palpitations  Gastrointestinal ROS: No abdominal pain or melena  Genito-Urinary ROS: No dysuria or hematuria  Psych ROS: No anxiety and depression  14 point ROS reviewed with the patient and negative except as noted above and in the HPI unless unable to obtain.    Objective:  Patient Vitals for the past 24 hrs:   BP Temp Temp src Pulse Resp SpO2 Weight   01/16/25 1012 160/64 -- -- -- -- -- --   01/16/25 0734 (!) 208/62 97.7 °F (36.5 °C) Oral 63 16 96 % --   01/16/25 0316 152/42 98.4 °F (36.9 °C) Oral 51 16 97 % 60 kg (132 lb 3.2 oz)   01/15/25 2302 123/45 98.2 °F (36.8 °C) Oral 58 18 97 % --   01/15/25 1919 155/54 98 °F (36.7 °C) Oral 66 18 95 % --   01/15/25 1648 152/68 98.3 °F (36.8 °C) Oral 63 18 94 % --   01/15/25 1055 (!) 196/57 98.3 °F (36.8 °C) Oral 58 16 95 % --       Intake/Output Summary (Last 24 hours) at 1/16/2025 1045  Last data filed at 1/16/2025 0734  Gross per 24 hour   Intake 580 ml   Output 1425 ml   Net -845 ml     General: awake/alert   Chest:  clear to auscultation bilaterally without respiratory distress  CVS: regular rate and rhythm  Abdominal: soft, nontender, positive bowel sounds  Extremities: no cyanosis or edema  Skin: warm and dry without rash      Labs:  Results from last 7 days   Lab Units 01/16/25  0225 01/15/25  0214 01/14/25  0419   WBC 10*3/mm3 5.40 6.04 4.97   HEMOGLOBIN g/dL 8.2* 8.9* 8.8*   HEMATOCRIT % 26.2* 27.6* 28.5*   PLATELETS 10*3/mm3 81* 85* 83*         Results from last 7 days   Lab Units 01/16/25  0225 01/15/25  0214 01/14/25  0419   SODIUM mmol/L 140 139 140   POTASSIUM mmol/L 4.0 4.2 4.1   CHLORIDE mmol/L 105 103  104   CO2 mmol/L 22.0 22.0 22.0   BUN mg/dL 79* 81* 77*   CREATININE mg/dL 3.98* 3.81* 4.00*   CALCIUM mg/dL 8.7 8.8 8.6   EGFR mL/min/1.73 14.9* 15.7* 14.8*   BILIRUBIN mg/dL 0.4 0.3 0.4   ALK PHOS U/L 142* 141* 140*   ALT (SGPT) U/L 8 8 9   AST (SGOT) U/L 15 15 16   GLUCOSE mg/dL 108* 109* 108*       Radiology:   Imaging Results (Last 72 Hours)       Procedure Component Value Units Date/Time    US Renal Bilateral [[id number]] Collected: 01/12/25 1641     Updated: 01/15/25 1350    Narrative:      US RENAL BILATERAL- 1/12/2025 12:40 PM     HISTORY: CKD; I50.31-Acute diastolic (congestive) heart failure;  N18.4-Chronic kidney disease, stage 4 (severe); I10-Essential (primary)  hypertension; R00.1-Bradycardia, unspecified; R05.8-Other specified  cough; R79.89-Other specified abnormal findings of blood chemistry     COMPARISON: 7/12/2021     TECHNIQUE: Multiple longitudinal and transverse realtime sonographic  images of the kidneys and urinary bladder are obtained. Images stored in  PACS [facility] institutional protocol.     FINDINGS:      Trace perihepatic ascites. Cirrhotic liver morphology. Bilateral pleural  effusions.     Right kidney measures 9.5 x 4.1 x 4.6 cm. 1.7 cm cyst without complex  features. Increased renal cortical echogenicity. No hydronephrosis.     Left kidney: 9.6 x 5.3 x 5.1 cm. Simple appearing left renal cyst  without complex features measuring up to 1.1 cm.     Bladder is trabeculated. Prostate is borderline enlarged measuring 3.6 x  3.0 x 4.9 cm.       Impression:      1. Increased bilateral renal cortical echogenicity which can be seen  with medical renal disease. No hydronephrosis.  2. Cirrhotic liver morphology with trace ascites and bilateral pleural  effusions.  3. Trabeculated bladder which may be related to chronic bladder outlet  obstruction.           This report was signed and finalized on 1/12/2025 4:45 PM by Jake Briseno.       US Renal Artery Complete [[id number]] Collected:  01/12/25 1641     Updated: 01/15/25 1349    Narrative:      US RENAL ARTERY COMPLETE-     HISTORY: secondary HTN, PVD; I50.31-Acute diastolic (congestive) heart  failure; N18.4-Chronic kidney disease, stage 4 (severe); I10-Essential  (primary) hypertension; R00.1-Bradycardia, unspecified; R05.8-Other  specified cough; R79.89-Other specified abnormal findings of blood  chemistry     COMPARISON: None     FINDINGS: 2D grayscale, color Doppler, spectral analysis performed of  the abdominal aorta and renal arteries.      IVC is patent. Atherosclerotic changes of the normal caliber abdominal  aorta. Peak systolic velocity of the aorta measuring 55.9 cm/s.     Peak systolic velocities of the right proximal, mid, distal, and arcuate  renal arteries measure 92, 99, 47, and 24 cm/s respectively. These  measurements in ratio to the abdominal aorta results in velocity ratios  of 1.78 or less. Resistive indices ranging from 0.69-1.0.     Peak systolic velocities of the left proximal, mid, distal, and arcuate  renal arteries measuring 66, 94, 80, 27 cm/s respectively. These  measurements in ratio to the abdominal aorta result in velocity ratios  of 1.69 or less. Resistive indices ranging from 0.70-0.79.     Ultrasound images of the bilateral kidneys and bladder performed  1/12/2025 are reviewed. Kidneys are echogenic compared to liver often  seen with chronic medical renal disease. Right kidney measures 9.5 x 4.1  x 4.6 cm. There is a 1.7 cm exophytic right renal cyst.  The left kidney measures 9.6 x 5.3 x 5.1 cm. There is a superior 1.1 cm  left renal cyst. No solid-appearing renal mass. No hydronephrosis. No  obstructing intrarenal stones. No abnormal perinephric fluid collection.     The distended bladder demonstrates mild diffuse wall thickening with no  focal polypoid mucosal lesion identified. Prostate measuring  approximately 4.9 x 3.6 x 3.0 cm.     Bilateral pleural effusions are noted.       Impression:      1. No  sonographic evidence of significant renal artery stenosis.  2. Echogenic appearance of the renal parenchyma favoring medical renal  disease. Bilateral renal cysts with no solid renal mass or  hydronephrosis identified.  3. Mild bladder wall thickening suspected which may relate to muscle  wall hypertrophy, correlation for signs of cystitis recommended.        This report was signed and finalized on 1/15/2025 9:10 AM by Dr. Ruma rCoss MD.               Culture:  Blood Culture   Date Value Ref Range Status   01/10/2025 No growth at 2 days  Preliminary   01/10/2025 No growth at 2 days  Preliminary         Assessment    Chronic kidney disease stage 4  Acute on chronic diastolic CHF  Hypertension  Crohn's disease  Metabolic acidosis--improved  Anemia of CKD    Plan:   Renal function about stable  Continuing to adjust HTN medications to achieve adequate control of BP  Monitor labs      Slava Tate, APRN  1/16/2025  10:45 CST

## 2025-01-16 NOTE — PLAN OF CARE
Goal Outcome Evaluation:  Plan of Care Reviewed With: patient        Progress: improving   Pt rested well overnight with no complaints of pain. Pt BP remained in a desired range during shift. Pt ran sinus binh. Sinus 48-73 pvc; per tele. Safety maintained during shift. Call light within reach.

## 2025-01-16 NOTE — CASE MANAGEMENT/SOCIAL WORK
Case Management Discharge Note                Selected Continued Care - Admitted Since 1/10/2025       Destination    No services have been selected for the patient.                Durable Medical Equipment    No services have been selected for the patient.                Dialysis/Infusion    No services have been selected for the patient.                Home Medical Care    No services have been selected for the patient.                Therapy    No services have been selected for the patient.                Community Resources    No services have been selected for the patient.                Community & DME    No services have been selected for the patient.                         Final Discharge Disposition Code: 01 - home or self-care

## 2025-01-16 NOTE — TELEPHONE ENCOUNTER
Received referral from Dr. Pineda requesting pt to establish care with [facility] as a HFU discharged 1/16/25. Pt is not interested in scheduling at this time because he just got out of the hospital. SETH YANEZ 1/16/25 3:04

## 2025-01-17 ENCOUNTER — TRANSITIONAL CARE MANAGEMENT TELEPHONE ENCOUNTER (OUTPATIENT)
Dept: CALL CENTER | Facility: HOSPITAL | Age: 75
End: 2025-01-17
Payer: MEDICARE

## 2025-01-17 NOTE — OUTREACH NOTE
Call Center TCM Note      Flowsheet Row Responses   [facility] patient discharged from? [place] [facility]   Does the patient have one of the following disease processes/diagnoses(primary or secondary)? CHF   TCM attempt successful? Yes   Call start time 1354   Call end time 1259   Discharge diagnosis Acute on chronic diastolic congestive heart failure   Meds reviewed with patient/caregiver? Yes   Is the patient having any side effects they believe may be caused by any medication additions or changes? No   Does the patient have all medications ordered at discharge? Yes   Is the patient taking all medications as directed (includes completed medication regime)? Yes   Comments 1/20/2025  9:45 AM  HOSPITAL FOLLOW UP WITH CARDIOLOGY.   Does the patient have an appointment with their PCP within 7-14 days of discharge? No   Nursing Interventions Patient declined scheduling/rescheduling appointment at this time   Has home health visited the patient within 72 hours of discharge? N/A   Pulse Ox monitoring None   Psychosocial issues? No   Did the patient receive a copy of their discharge instructions? Yes   Nursing interventions Reviewed instructions with patient   What is the patient's perception of their health status since discharge? Improving   Nursing interventions Nurse provided patient education   Is the patient able to teach back signs and symptoms of worsening condition? (i.e. weight gain, shortness of air, etc.) Yes   If the patient is a current smoker, are they able to teach back resources for cessation? Not a smoker   Is the patient/caregiver able to teach back the hierarchy of who to call/visit for symptoms/problems? PCP, Specialist, Home health nurse, Urgent Care, ED, 911 Yes   Is the patient able to teach back Heart Failure Zones? Yes   CHF Zone this Call Green Zone   Green Zone Patient reports doing well, No new or worsening shortness of breath, No new swelling -  feet, ankles and legs look normal for you, No chest  pain   Green Zone Interventions Daily weight check, Meds as directed, Low sodium diet, Follow up visits planned  [Advised daily weight check per AVS, v/u.]   TCM call completed? Yes   Call end time 5934   Would this patient benefit from a Referral to [facility] Social Work? No   Is the patient interested in additional calls from an ambulatory ? No             Alyson Sanchez RN    1/17/2025, 13:03 CST

## 2025-01-20 ENCOUNTER — OFFICE VISIT (OUTPATIENT)
Dept: CARDIOLOGY | Facility: CLINIC | Age: 75
End: 2025-01-20
Payer: MEDICARE

## 2025-01-20 VITALS
SYSTOLIC BLOOD PRESSURE: 110 MMHG | BODY MASS INDEX: 19.37 KG/M2 | HEIGHT: 72 IN | HEART RATE: 69 BPM | OXYGEN SATURATION: 99 % | DIASTOLIC BLOOD PRESSURE: 64 MMHG | WEIGHT: 143 LBS

## 2025-01-20 DIAGNOSIS — I50.33 ACUTE ON CHRONIC DIASTOLIC CONGESTIVE HEART FAILURE: Primary | ICD-10-CM

## 2025-01-20 DIAGNOSIS — E78.2 MIXED HYPERLIPIDEMIA: ICD-10-CM

## 2025-01-20 DIAGNOSIS — R01.1 SYSTOLIC MURMUR: Chronic | ICD-10-CM

## 2025-01-20 DIAGNOSIS — R00.1 BRADYCARDIA: ICD-10-CM

## 2025-01-20 DIAGNOSIS — I10 ESSENTIAL HYPERTENSION: ICD-10-CM

## 2025-01-20 DIAGNOSIS — I73.9 PAD (PERIPHERAL ARTERY DISEASE): ICD-10-CM

## 2025-01-20 DIAGNOSIS — N18.4 CKD (CHRONIC KIDNEY DISEASE) STAGE 4, GFR 15-29 ML/MIN: ICD-10-CM

## 2025-01-20 LAB
ALDOST SERPL-MCNC: 2.1 NG/DL (ref 0–30)
ALDOST/RENIN PLAS-RTO: 4.3 {RATIO} (ref 0–30)
RENIN PLAS-CCNC: 0.49 NG/ML/HR (ref 0.17–5.38)

## 2025-01-20 PROCEDURE — 99214 OFFICE O/P EST MOD 30 MIN: CPT | Performed by: EMERGENCY MEDICINE

## 2025-01-20 PROCEDURE — 3074F SYST BP LT 130 MM HG: CPT | Performed by: EMERGENCY MEDICINE

## 2025-01-20 PROCEDURE — 3078F DIAST BP <80 MM HG: CPT | Performed by: EMERGENCY MEDICINE

## 2025-01-20 PROCEDURE — G2211 COMPLEX E/M VISIT ADD ON: HCPCS | Performed by: EMERGENCY MEDICINE

## 2025-01-20 NOTE — PROGRESS NOTES
Subjective:     Encounter Date:01/20/2025      Patient ID: Ricardo Hugo is a 74 y.o. male.    Chief Complaint:  History of Present Illness  History of Present Illness  The patient presents for evaluation of hypertension.    He was discharged from the hospital 2 days ago and reports no symptoms since returning home. He is not experiencing chest pain, shortness of breath, dizziness, or syncope. His blood pressure has been fluctuating, with readings ranging from 111 to 223. It tends to be elevated in the mornings but decreases by 30 to 50 points after taking his medication. He has been struggling with uncontrolled blood pressure for several years. He reports no severe headaches and overall feels well. He was previously on nifedipine 90 mg, which was reduced to 60 mg during his hospital stay due to a decrease in heart rate. He did not experience any dizziness or lightheadedness while hospitalized. His current medication regimen includes baby aspirin, atorvastatin, clonidine 0.3 mg 3 times daily (occasionally 4 times at night), clonidine patches 0.2 mg, Plavix, Jardiance, Lasix 40 mg, hydralazine 10 mg 3 times daily, isosorbide 10 mg 3 times daily, nifedipine 60 mg once daily, and valsartan 320 mg once daily. He takes nifedipine and valsartan in the morning.    MEDICATIONS  Baby aspirin, atorvastatin, clonidine, Plavix, Jardiance, Lasix, hydralazine, isosorbide, nifedipine, valsartan.        Review of Systems   Constitutional: Negative for diaphoresis, fever and malaise/fatigue.   HENT:  Negative for congestion.    Eyes:  Negative for vision loss in left eye and vision loss in right eye.   Cardiovascular:  Negative for chest pain, claudication, dyspnea on exertion, irregular heartbeat, leg swelling, orthopnea, palpitations and syncope.   Respiratory:  Negative for cough, shortness of breath and wheezing.    Hematologic/Lymphatic: Negative for adenopathy.   Skin:  Negative for rash.   Musculoskeletal:  Negative  for joint pain and joint swelling.   Gastrointestinal:  Negative for abdominal pain, diarrhea, nausea and vomiting.   Neurological:  Negative for excessive daytime sleepiness, dizziness, focal weakness, light-headedness, numbness and weakness.   Psychiatric/Behavioral:  Negative for depression. The patient does not have insomnia.            Current Outpatient Medications:     albuterol sulfate  (90 Base) MCG/ACT inhaler, Inhale 2 puffs 4 (Four) Times a Day As Needed for Wheezing or Shortness of Air., Disp: , Rfl:     ALPRAZolam (XANAX) 0.25 MG tablet, Take 1 tablet by mouth Every Night., Disp: , Rfl:     aspirin (aspirin) 81 MG EC tablet, Take 1 tablet by mouth Daily., Disp: , Rfl:     atorvastatin (LIPITOR) 10 MG tablet, Take 1 tablet by mouth Daily., Disp: 90 tablet, Rfl: 3    Budeson-Glycopyrrol-Formoterol (Breztri Aerosphere) 160-9-4.8 MCG/ACT aerosol inhaler, Inhale 2 puffs 2 (Two) Times a Day., Disp: 1 each, Rfl: 3    calcitriol (ROCALTROL) 0.5 MCG capsule, Take 1 capsule by mouth Daily for 30 days., Disp: 30 capsule, Rfl: 0    cholestyramine light 4 g packet, Take 1 packet by mouth Daily., Disp: , Rfl:     cloNIDine (CATAPRES) 0.3 MG tablet, Take 1 tablet by mouth 3 (Three) Times a Day., Disp: 270 tablet, Rfl: 2    cloNIDine (CATAPRES-TTS) 0.2 MG/24HR patch, Place 1 patch on the skin as directed by provider 1 (One) Time Per Week. THURSDAYS, Disp: , Rfl:     clopidogrel (PLAVIX) 75 MG tablet, Take 1 tablet by mouth Daily., Disp: , Rfl:     Copper Gluconate (Copper Caps) 2 MG capsule, Take 2 mg by mouth Daily., Disp: , Rfl:     docusate sodium (COLACE) 100 MG capsule, Take 1 capsule by mouth 3 (Three) Times a Day As Needed for Constipation., Disp: , Rfl:     empagliflozin (JARDIANCE) 10 MG tablet tablet, Take 1 tablet by mouth Daily., Disp: 30 tablet, Rfl: 0    fexofenadine (ALLEGRA) 180 MG tablet, Take 1 tablet by mouth Daily., Disp: , Rfl:     fluticasone (FLONASE) 50 MCG/ACT nasal spray, Administer 2  sprays into the nostril(s) as directed by provider Daily., Disp: , Rfl:     furosemide (LASIX) 40 MG tablet, Take 1 tablet by mouth Daily., Disp: 30 tablet, Rfl: 0    hydrALAZINE (APRESOLINE) 100 MG tablet, Take 1 tablet by mouth 3 (Three) Times a Day. 100mg daily, Disp: , Rfl:     isosorbide dinitrate (ISORDIL) 10 MG tablet, Take 1 tablet by mouth 3 (Three) Times a Day for 30 days., Disp: 90 tablet, Rfl: 0    levothyroxine (Synthroid) 75 MCG tablet, Take 1 tablet by mouth Daily., Disp: 90 tablet, Rfl: 3    melatonin 5 MG tablet tablet, Take 2 tablets by mouth Every Night for 10 days., Disp: 20 tablet, Rfl: 0    mesalamine (APRISO) 0.375 g 24 hr capsule, Take 4 capsules by mouth Daily., Disp: , Rfl:     montelukast (SINGULAIR) 10 MG tablet, Take 1 tablet by mouth Every Night., Disp: , Rfl:     Multiple Vitamins-Minerals (PRESERVISION/LUTEIN) capsule, Take 1 capsule by mouth 2 (two) times a day., Disp: , Rfl:     NIFEdipine CC (ADALAT CC) 60 MG 24 hr tablet, Take 2 tablets by mouth Daily for 270 days., Disp: 90 tablet, Rfl: 5    omeprazole (priLOSEC) 20 MG capsule, Take 1 capsule by mouth Daily., Disp: , Rfl:     sodium bicarbonate 650 MG tablet, Take 2 tablets by mouth Every 12 (Twelve) Hours for 30 days., Disp: 120 tablet, Rfl: 0    sodium bicarbonate 650 MG tablet, Take 1 tablet by mouth Daily With Lunch for 30 days., Disp: 30 tablet, Rfl: 0    tamsulosin (FLOMAX) 0.4 MG capsule 24 hr capsule, Take 1 capsule by mouth Every Night., Disp: 90 capsule, Rfl: 3    valsartan (DIOVAN) 320 MG tablet, Take 1 tablet by mouth Daily., Disp: , Rfl:     vitamin D (ERGOCALCIFEROL) 1.25 MG (66550 UT) capsule capsule, Take 1 capsule by mouth 1 (One) Time Per Week., Disp: , Rfl:     Current Facility-Administered Medications:     cyanocobalamin injection 1,000 mcg, 1,000 mcg, Intramuscular, Q28 Days, Ruthie Parra STERLING, 1,000 mcg at 08/11/23 1123       Objective:      Vitals:    01/20/25 1012   BP: 110/64   Pulse: 69   SpO2:  99%     Vitals and nursing note reviewed.   Constitutional:       Appearance: Normal and healthy appearance. Well-developed and not in distress.   Eyes:      Extraocular Movements: Extraocular movements intact.      Pupils: Pupils are equal, round, and reactive to light.   HENT:      Head: Normocephalic and atraumatic.    Mouth/Throat:      Pharynx: Oropharynx is clear.   Neck:      Vascular: JVD normal.      Trachea: Trachea normal.   Pulmonary:      Effort: Pulmonary effort is normal.      Breath sounds: Normal breath sounds. No wheezing. No rhonchi. No rales.   Cardiovascular:      PMI at left midclavicular line. Normal rate. Regular rhythm. Normal S1. Normal S2.       Murmurs: There is a grade 2/6 systolic murmur.      No gallop.  No click. No rub.   Pulses:     Dorsalis pedis: 2+ bilaterally.     Posterior tibial: 2+ bilaterally.  Abdominal:      General: Bowel sounds are normal.      Palpations: Abdomen is soft.      Tenderness: There is no abdominal tenderness.   Musculoskeletal: Normal range of motion.      Cervical back: Normal range of motion and neck supple. Skin:     General: Skin is warm and dry.      Capillary Refill: Capillary refill takes less than 2 seconds.   Feet:      Right foot:      Skin integrity: Skin integrity normal.      Left foot:      Skin integrity: Skin integrity normal.   Neurological:      Mental Status: Alert and oriented to person, place and time.      Sensory: Sensation is intact.      Motor: Motor function is intact.      Coordination: Coordination is intact.   Psychiatric:         Speech: Speech normal.         Behavior: Behavior is cooperative.       Physical Exam  Vital Signs  The patient's blood pressure readings have been 111, 174, 139, 223, and 190. Heart rate is 69 today.    Lab Review:       Procedures  Results  Laboratory Studies  Cortisol levels were normal. Renin aldosterone testosterone was 3.8. Electrolytes are all good. Creatinine levels were 3.4, 3.5, 3.7, and 4.0  a week ago.    Imaging  Chest x-ray showed pulmonary edema. CT chest was performed. No ultrasound evidence of significant renal artery stenosis.    Assessment/Plan:     Problem List Items Addressed This Visit       Essential hypertension    Relevant Medications    NIFEdipine CC (ADALAT CC) 60 MG 24 hr tablet    PAD (peripheral artery disease)    CKD (chronic kidney disease) stage 4, GFR 15-29 ml/min    Mixed hyperlipidemia    Systolic murmur (Chronic)    Acute on chronic diastolic congestive heart failure - Primary    Relevant Medications    NIFEdipine CC (ADALAT CC) 60 MG 24 hr tablet    Bradycardia     Assessment & Plan  1. Hypertension.  His blood pressure has been fluctuating significantly, with readings as high as 223. He is currently on multiple antihypertensive medications, including clonidine 0.3 mg 3 times a day, hydralazine 10 mg 3 times a day, isosorbide 10 mg 3 times a day, nifedipine 60 mg once a day, valsartan 320 mg once a day, and Lasix 40 mg 3 times a day. Despite this regimen, his blood pressure remains uncontrolled, likely due to his kidney function. He is also on baby aspirin, atorvastatin, Plavix, Jardiance. He will continue his current medication regimen. The nifedipine dosage will be increased to 120 mg, taken as 2 tablets at night instead of 1 in the morning. A refill for nifedipine has been provided.    2. Congestive heart failure.  He has grade 2 diastolic dysfunction, mild aortic stenosis, mild to moderate mitral regurgitation, and moderate to severe tricuspid regurgitation. Despite these conditions, he reports no shortness of breath or other symptoms since his recent hospital discharge. He will continue his current heart failure management regimen, including medications such as Lasix 40 mg 3 times a day and hydralazine 10 mg 3 times a day.    Follow-up  The patient will follow up in 1 month.      Recommendations/plans:    Transcribed from ambient dictation for Rip Mcguire DO by  Rip Mcguire DO.  01/20/25   10:55 CST    Patient or patient representative verbalized consent for the use of Ambient Listening during the visit with  Rip Mcguire DO for chart documentation. 1/20/2025  10:56 CST

## 2025-01-21 ENCOUNTER — TELEPHONE (OUTPATIENT)
Dept: INTERNAL MEDICINE | Facility: CLINIC | Age: 75
End: 2025-01-21
Payer: MEDICARE

## 2025-01-21 RX ORDER — CEFDINIR 300 MG/1
300 CAPSULE ORAL DAILY
Qty: 5 CAPSULE | Refills: 0 | Status: SHIPPED | OUTPATIENT
Start: 2025-01-21

## 2025-01-22 ENCOUNTER — OFFICE VISIT (OUTPATIENT)
Dept: INTERNAL MEDICINE | Facility: CLINIC | Age: 75
End: 2025-01-22
Payer: MEDICARE

## 2025-01-22 VITALS
DIASTOLIC BLOOD PRESSURE: 52 MMHG | BODY MASS INDEX: 19.1 KG/M2 | TEMPERATURE: 97.8 F | WEIGHT: 141 LBS | SYSTOLIC BLOOD PRESSURE: 146 MMHG | HEIGHT: 72 IN | OXYGEN SATURATION: 94 % | HEART RATE: 62 BPM

## 2025-01-22 DIAGNOSIS — R06.2 WHEEZING: Primary | ICD-10-CM

## 2025-01-22 PROCEDURE — 99213 OFFICE O/P EST LOW 20 MIN: CPT

## 2025-01-22 PROCEDURE — 3078F DIAST BP <80 MM HG: CPT

## 2025-01-22 PROCEDURE — 1126F AMNT PAIN NOTED NONE PRSNT: CPT

## 2025-01-22 PROCEDURE — 3077F SYST BP >= 140 MM HG: CPT

## 2025-01-22 RX ORDER — AZELASTINE 1 MG/ML
2 SPRAY, METERED NASAL AS NEEDED
COMMUNITY

## 2025-01-22 RX ORDER — OXYMETAZOLINE HYDROCHLORIDE 0.05 G/100ML
2 SPRAY NASAL AS NEEDED
COMMUNITY

## 2025-01-22 NOTE — PROGRESS NOTES
Subjective     Chief Complaint:  Wheezing    HPI:  Patient presents today for the above problems. Please see assessment and plan below.    Past Medical History:   Past Medical History:   Diagnosis Date    3-vessel CAD 8/11/2020    Allergic rhinitis     Anxiety disorder 4/27/2020    Arthritis     Asymmetrical sensorineural hearing loss 6/28/2017    Atherosclerosis of native artery of both lower extremities with intermittent claudication 7/18/2019    Avascular necrosis of femoral head, left 07/11/2020    right hip after surgery    Carotid stenosis     Chronic mucoid otitis media     Chronic rhinitis     Coronary artery disease     HEART BYPASS 2004    Crohn's disease of large intestine with other complication 7/30/2020    Chronic diarrhea Colonoscopy July 2020 revealed mild patchy scattered hemosiderin staining with inflammation more so in rectosigmoid area.  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    Iron deficiency anemia 7/14/2020    Mixed hearing loss of left ear     PAD (peripheral artery disease) 8/11/2020    Perianal abscess     Pernicious anemia 08/17/2020    took shots but never diagnosed with b12 deficiency    Personal history of alcoholism 08/11/2020    quit drinking in 2013    Prostatic hypertrophy 8/11/2020    Sensorineural hearing loss     Sepsis with acute renal failure 9/15/2020    Shortness of breath 5/27/2021    Tinnitus     Ventricular tachycardia, nonsustained 7/14/2020    Weight loss 7/11/2020     Past Surgical History:  Past Surgical History:   Procedure Laterality Date    ARTERY SURGERY  2021    right side on neck    CAROTID ENDARTERECTOMY Right  05/10/2021    Procedure: RIGHT CAROTID ENDARTERECTOMY WITH EEG;  Surgeon: Gil Pineda DO;  Location: [facility] HYBRID OR 12;  Service: Vascular;  Laterality: Right;    COLONOSCOPY N/A 07/02/2020    Procedure: COLONOSCOPY WITH ANESTHESIA;  Surgeon: Adrien Brewster MD;  Location: [facility] ENDOSCOPY;  Service: Gastroenterology;  Laterality: N/A;  pre op: diarrhea  post op: polyps  PCP: Joe Velasco MD    COLONOSCOPY N/A 10/13/2020    Procedure: COLONOSCOPY WITH ANESTHESIA;  Surgeon: Adrien Brewster MD;  Location: [facility] ENDOSCOPY;  Service: Gastroenterology;  Laterality: N/A;  Pre: Chronic Diarrhea, Crohn's  Post: AVM  Dr. Neftali Velasco  CO2 Inflation Used    COLONOSCOPY N/A 12/08/2023    Procedure: COLONOSCOPY WITH ANESTHESIA;  Surgeon: Adrien Brewster MD;  Location: [facility] ENDOSCOPY;  Service: Gastroenterology;  Laterality: N/A;  pre chrone's disease  post sub optimal prep, polyp, chrone's      CORONARY ARTERY BYPASS GRAFT  2003    x3    ENDOSCOPY N/A 11/02/2021    Procedure: ESOPHAGOGASTRODUODENOSCOPY WITH ANESTHESIA;  Surgeon: Bridger Bell MD;  Location: [facility] ENDOSCOPY;  Service: Gastroenterology;  Laterality: N/A;  pre anemia;gi bleed  post  gi bleed;schatski ring  Dr. ERIC Velasco    ENDOSCOPY N/A 10/10/2023    Procedure: ESOPHAGOGASTRODUODENOSCOPY WITH ANESTHESIA;  Surgeon: Adrien Brewster MD;  Location: [facility] ENDOSCOPY;  Service: Gastroenterology;  Laterality: N/A;  preop; anemia  postop esophagitis ; R/O barretts   PCP Randall Beata    EYE SURGERY Bilateral     catorac    INCISION AND DRAINAGE PERIRECTAL ABSCESS N/A 03/03/2017    Procedure: INCISION AND DRAINAGE OF JEET ANAL ABSCESS;  Surgeon: Lynette Smith MD;  Location: [facility] OR;  Service:     INGUINAL HERNIA REPAIR Bilateral 06/27/2023    Procedure: INGUINAL HERNIA BILATERAL REPAIR LAPAROSCOPIC WITH DAVINCI ROBOT WITH MESH;  Surgeon: Tahira Rivera MD;  Location: [facility] OR;  Service: Robotics - DaVinci;   Laterality: Bilateral;    MYRINGOTOMY W/ TUBES Left 04/17/2017    06/10/2016    TONSILLECTOMY      TOTAL HIP ARTHROPLASTY Right 2006       Allergies:  Allergies   Allergen Reactions    Ondansetron Anaphylaxis and GI Intolerance    Zofran [Ondansetron Hcl] Anaphylaxis    Lortab [Hydrocodone-Acetaminophen] Other (See Comments) and Hallucinations     CLOSTROPHOBIC    Allopurinol Other (See Comments)     Pain on right side     Medications:  Prior to Admission medications    Medication Sig Start Date End Date Taking? Authorizing Provider   albuterol sulfate  (90 Base) MCG/ACT inhaler Inhale 2 puffs 4 (Four) Times a Day As Needed for Wheezing or Shortness of Air.    Twyla Guevara MD   ALPRAZolam (XANAX) 0.25 MG tablet Take 1 tablet by mouth Every Night.    Twyla Guevara MD   aspirin (aspirin) 81 MG EC tablet Take 1 tablet by mouth Daily.    Twyla Guevara MD   atorvastatin (LIPITOR) 10 MG tablet Take 1 tablet by mouth Daily. 9/4/24   Nathan Zimmer MD   Budeson-Glycopyrrol-Formoterol (Breztri Aerosphere) 160-9-4.8 MCG/ACT aerosol inhaler Inhale 2 puffs 2 (Two) Times a Day. 1/3/25   Fidel, Trinity C, APRN   calcitriol (ROCALTROL) 0.5 MCG capsule Take 1 capsule by mouth Daily for 30 days. 1/16/25 2/15/25  Jamie Pineda MD   cefdinir (OMNICEF) 300 MG capsule Take 1 capsule by mouth Daily. 1/21/25   Nathan Zimmer MD   cholestyramine light 4 g packet Take 1 packet by mouth Daily.    Twyla Guevara MD   cloNIDine (CATAPRES) 0.3 MG tablet Take 1 tablet by mouth 3 (Three) Times a Day. 8/19/24   Ntahan Zimmer MD   cloNIDine (CATAPRES-TTS) 0.2 MG/24HR patch Place 1 patch on the skin as directed by provider 1 (One) Time Per Week. THURSDAYS    Twyla Guevara MD   clopidogrel (PLAVIX) 75 MG tablet Take 1 tablet by mouth Daily.    Twyla Guevara MD   Copper Gluconate (Copper Caps) 2 MG capsule Take 2 mg by mouth Daily.    Twyla Guevara MD    docusate sodium (COLACE) 100 MG capsule Take 1 capsule by mouth 3 (Three) Times a Day As Needed for Constipation.    Twyla Guevara MD   empagliflozin (JARDIANCE) 10 MG tablet tablet Take 1 tablet by mouth Daily. 1/16/25   Jamie Pineda MD   fexofenadine (ALLEGRA) 180 MG tablet Take 1 tablet by mouth Daily.    Twyla Guevara MD   fluticasone (FLONASE) 50 MCG/ACT nasal spray Administer 2 sprays into the nostril(s) as directed by provider Daily.    Twyla Guevara MD   furosemide (LASIX) 40 MG tablet Take 1 tablet by mouth Daily. 1/16/25   Jamie Pineda MD   hydrALAZINE (APRESOLINE) 100 MG tablet Take 1 tablet by mouth 3 (Three) Times a Day. 100mg daily 8/1/23   Nathan Zimmer MD   isosorbide dinitrate (ISORDIL) 10 MG tablet Take 1 tablet by mouth 3 (Three) Times a Day for 30 days. 1/16/25 2/15/25  Jamie Pineda MD   levothyroxine (Synthroid) 75 MCG tablet Take 1 tablet by mouth Daily. 5/1/24   Nathan Zimmer MD   melatonin 5 MG tablet tablet Take 2 tablets by mouth Every Night for 10 days. 1/15/25 1/25/25  Jamie Pineda MD   mesalamine (APRISO) 0.375 g 24 hr capsule Take 4 capsules by mouth Daily.    Twyla Guevara MD   montelukast (SINGULAIR) 10 MG tablet Take 1 tablet by mouth Every Night.    Twyla Guevara MD   Multiple Vitamins-Minerals (PRESERVISION/LUTEIN) capsule Take 1 capsule by mouth 2 (two) times a day.    Twyla Guevara MD   NIFEdipine CC (ADALAT CC) 60 MG 24 hr tablet Take 2 tablets by mouth Daily for 270 days. 1/20/25 10/17/25  Rip Mcguire DO   omeprazole (priLOSEC) 20 MG capsule Take 1 capsule by mouth Daily.    Twyla Guevara MD   sodium bicarbonate 650 MG tablet Take 2 tablets by mouth Every 12 (Twelve) Hours for 30 days. 1/15/25 2/14/25  Jamie Pineda MD   sodium bicarbonate 650 MG tablet Take 1 tablet by mouth Daily With Lunch for 30 days. 1/15/25 2/14/25   "Jamie Pineda MD   tamsulosin (FLOMAX) 0.4 MG capsule 24 hr capsule Take 1 capsule by mouth Every Night. 1/3/25   Ruthie Parra APRN   valsartan (DIOVAN) 320 MG tablet Take 1 tablet by mouth Daily. 5/17/23   Nathan Zimmer MD   vitamin D (ERGOCALCIFEROL) 1.25 MG (10753 UT) capsule capsule Take 1 capsule by mouth 1 (One) Time Per Week.    Provider, MD Twyla       Objective     Vital Signs: /52 (BP Location: Left arm, Patient Position: Sitting, Cuff Size: Adult)   Pulse 62   Temp 97.8 °F (36.6 °C) (Temporal)   Ht 182.9 cm (72.01\")   Wt 64 kg (141 lb)   SpO2 94%   BMI 19.12 kg/m²   Physical Exam  Constitutional:       Appearance: Normal appearance.   Cardiovascular:      Rate and Rhythm: Normal rate.   Pulmonary:      Effort: Pulmonary effort is normal. No respiratory distress.      Breath sounds: Normal breath sounds.   Neurological:      Mental Status: He is alert and oriented to person, place, and time. Mental status is at baseline.      Motor: No weakness.       Results Reviewed:  None relevant.    Assessment / Plan     Assessment/Plan:  Diagnoses and all orders for this visit:    1. Wheezing (Primary)       History of Present Illness  The patient is a 74-year-old male who presents today for follow-up on wheezing. He was admitted to [facility] on 01/10/2025 for management of CHF. He sent a [email or web address] message on 01/21/2025 stating that after returning home from the hospital, he realized he needed a refill of his medication for wheezing (albuterol inhaler).    He experienced an episode of wheezing on Monday night. The following morning, he reported feeling well and has not experienced any further wheezing episodes since then. He reports no exacerbation of sinus drainage beyond his usual baseline. No other acute symptoms.    Assessment & Plan  1. Wheezing.  He reported wheezing on Monday night, which resolved by the next morning. His lungs sound clear upon examination. He was " advised not to start the antibiotic prescribed by Dr. Zimmer unless symptoms worsen. He plans to  the antibiotic to have it on hand just in case. He does not need a refill for his albuterol inhaler as he already has it.    Return for Next scheduled follow up. unless patient needs to be seen sooner or acute issues arise.    Patient or patient representative verbalized consent for the use of Ambient Listening during the visit with  STERLING Snell for chart documentation. 1/22/2025  16:32 CST    I have discussed the patient results/orders and and plan/recommendation with them at today's visit.      STERLING Snell   01/22/2025

## 2025-01-23 ENCOUNTER — LAB (OUTPATIENT)
Dept: LAB | Facility: HOSPITAL | Age: 75
End: 2025-01-23
Payer: MEDICARE

## 2025-01-23 ENCOUNTER — OFFICE VISIT (OUTPATIENT)
Dept: ONCOLOGY | Facility: CLINIC | Age: 75
End: 2025-01-23
Payer: MEDICARE

## 2025-01-23 ENCOUNTER — INFUSION (OUTPATIENT)
Dept: ONCOLOGY | Facility: HOSPITAL | Age: 75
End: 2025-01-23
Payer: MEDICARE

## 2025-01-23 VITALS
OXYGEN SATURATION: 92 % | SYSTOLIC BLOOD PRESSURE: 142 MMHG | RESPIRATION RATE: 18 BRPM | HEART RATE: 72 BPM | TEMPERATURE: 97.1 F | DIASTOLIC BLOOD PRESSURE: 42 MMHG

## 2025-01-23 VITALS
OXYGEN SATURATION: 91 % | RESPIRATION RATE: 16 BRPM | TEMPERATURE: 97.7 F | WEIGHT: 142.3 LBS | SYSTOLIC BLOOD PRESSURE: 126 MMHG | HEIGHT: 72 IN | HEART RATE: 68 BPM | DIASTOLIC BLOOD PRESSURE: 58 MMHG | BODY MASS INDEX: 19.27 KG/M2

## 2025-01-23 DIAGNOSIS — N18.4 ANEMIA DUE TO STAGE 4 CHRONIC KIDNEY DISEASE: Primary | ICD-10-CM

## 2025-01-23 DIAGNOSIS — D63.1 ANEMIA DUE TO STAGE 4 CHRONIC KIDNEY DISEASE: Primary | ICD-10-CM

## 2025-01-23 DIAGNOSIS — C91.10 CLL (CHRONIC LYMPHOCYTIC LEUKEMIA): ICD-10-CM

## 2025-01-23 DIAGNOSIS — D63.1 ANEMIA DUE TO STAGE 4 CHRONIC KIDNEY DISEASE: ICD-10-CM

## 2025-01-23 DIAGNOSIS — D50.9 IRON DEFICIENCY ANEMIA, UNSPECIFIED IRON DEFICIENCY ANEMIA TYPE: ICD-10-CM

## 2025-01-23 DIAGNOSIS — N18.4 ANEMIA DUE TO STAGE 4 CHRONIC KIDNEY DISEASE: ICD-10-CM

## 2025-01-23 LAB
ALBUMIN SERPL-MCNC: 3.7 G/DL (ref 3.5–5.2)
ALBUMIN/GLOB SERPL: 1.3 G/DL
ALP SERPL-CCNC: 174 U/L (ref 39–117)
ALT SERPL W P-5'-P-CCNC: 11 U/L (ref 1–41)
ANION GAP SERPL CALCULATED.3IONS-SCNC: 16 MMOL/L (ref 5–15)
AST SERPL-CCNC: 19 U/L (ref 1–40)
BASOPHILS # BLD AUTO: 0.04 10*3/MM3 (ref 0–0.2)
BASOPHILS NFR BLD AUTO: 0.6 % (ref 0–1.5)
BILIRUB SERPL-MCNC: 0.5 MG/DL (ref 0–1.2)
BUN SERPL-MCNC: 60 MG/DL (ref 8–23)
BUN/CREAT SERPL: 16.4 (ref 7–25)
CALCIUM SPEC-SCNC: 9.1 MG/DL (ref 8.6–10.5)
CHLORIDE SERPL-SCNC: 102 MMOL/L (ref 98–107)
CO2 SERPL-SCNC: 20 MMOL/L (ref 22–29)
CREAT SERPL-MCNC: 3.65 MG/DL (ref 0.76–1.27)
DEPRECATED RDW RBC AUTO: 61.8 FL (ref 37–54)
EGFRCR SERPLBLD CKD-EPI 2021: 16.5 ML/MIN/1.73
EOSINOPHIL # BLD AUTO: 0.26 10*3/MM3 (ref 0–0.4)
EOSINOPHIL NFR BLD AUTO: 4.1 % (ref 0.3–6.2)
ERYTHROCYTE [DISTWIDTH] IN BLOOD BY AUTOMATED COUNT: 16.2 % (ref 12.3–15.4)
FERRITIN SERPL-MCNC: 248.3 NG/ML (ref 30–400)
FOLATE SERPL-MCNC: 7.2 NG/ML (ref 4.78–24.2)
GLOBULIN UR ELPH-MCNC: 2.8 GM/DL
GLUCOSE SERPL-MCNC: 104 MG/DL (ref 65–99)
HCT VFR BLD AUTO: 26.3 % (ref 37.5–51)
HGB BLD-MCNC: 8.4 G/DL (ref 13–17.7)
HOLD SPECIMEN: NORMAL
IMM GRANULOCYTES # BLD AUTO: 0.04 10*3/MM3 (ref 0–0.05)
IMM GRANULOCYTES NFR BLD AUTO: 0.6 % (ref 0–0.5)
IRON 24H UR-MRATE: 88 MCG/DL (ref 59–158)
IRON SATN MFR SERPL: 35 % (ref 20–50)
LYMPHOCYTES # BLD AUTO: 0.77 10*3/MM3 (ref 0.7–3.1)
LYMPHOCYTES NFR BLD AUTO: 12.1 % (ref 19.6–45.3)
MCH RBC QN AUTO: 33.1 PG (ref 26.6–33)
MCHC RBC AUTO-ENTMCNC: 31.9 G/DL (ref 31.5–35.7)
MCV RBC AUTO: 103.5 FL (ref 79–97)
MONOCYTES # BLD AUTO: 0.63 10*3/MM3 (ref 0.1–0.9)
MONOCYTES NFR BLD AUTO: 9.9 % (ref 5–12)
NEUTROPHILS NFR BLD AUTO: 4.65 10*3/MM3 (ref 1.7–7)
NEUTROPHILS NFR BLD AUTO: 72.7 % (ref 42.7–76)
NRBC BLD AUTO-RTO: 0 /100 WBC (ref 0–0.2)
PLATELET # BLD AUTO: 115 10*3/MM3 (ref 140–450)
PMV BLD AUTO: 9.3 FL (ref 6–12)
POTASSIUM SERPL-SCNC: 3.7 MMOL/L (ref 3.5–5.2)
PROT SERPL-MCNC: 6.5 G/DL (ref 6–8.5)
RBC # BLD AUTO: 2.54 10*6/MM3 (ref 4.14–5.8)
SODIUM SERPL-SCNC: 138 MMOL/L (ref 136–145)
TIBC SERPL-MCNC: 249 MCG/DL (ref 298–536)
TRANSFERRIN SERPL-MCNC: 167 MG/DL (ref 200–360)
VIT B12 BLD-MCNC: 360 PG/ML (ref 211–946)
WBC NRBC COR # BLD AUTO: 6.39 10*3/MM3 (ref 3.4–10.8)

## 2025-01-23 PROCEDURE — 36415 COLL VENOUS BLD VENIPUNCTURE: CPT

## 2025-01-23 PROCEDURE — 80053 COMPREHEN METABOLIC PANEL: CPT

## 2025-01-23 PROCEDURE — 85025 COMPLETE CBC W/AUTO DIFF WBC: CPT

## 2025-01-23 PROCEDURE — 3074F SYST BP LT 130 MM HG: CPT | Performed by: NURSE PRACTITIONER

## 2025-01-23 PROCEDURE — 82728 ASSAY OF FERRITIN: CPT

## 2025-01-23 PROCEDURE — 82607 VITAMIN B-12: CPT

## 2025-01-23 PROCEDURE — 3078F DIAST BP <80 MM HG: CPT | Performed by: NURSE PRACTITIONER

## 2025-01-23 PROCEDURE — 25010000002 EPOETIN ALFA-EPBX 40000 UNIT/ML SOLUTION: Performed by: NURSE PRACTITIONER

## 2025-01-23 PROCEDURE — 99214 OFFICE O/P EST MOD 30 MIN: CPT | Performed by: NURSE PRACTITIONER

## 2025-01-23 PROCEDURE — 1126F AMNT PAIN NOTED NONE PRSNT: CPT | Performed by: NURSE PRACTITIONER

## 2025-01-23 PROCEDURE — 84466 ASSAY OF TRANSFERRIN: CPT

## 2025-01-23 PROCEDURE — 96372 THER/PROPH/DIAG INJ SC/IM: CPT

## 2025-01-23 PROCEDURE — 83540 ASSAY OF IRON: CPT

## 2025-01-23 PROCEDURE — 82746 ASSAY OF FOLIC ACID SERUM: CPT

## 2025-01-23 RX ADMIN — EPOETIN ALFA-EPBX 40000 UNITS: 40000 INJECTION, SOLUTION INTRAVENOUS; SUBCUTANEOUS at 09:54

## 2025-01-23 NOTE — PROGRESS NOTES
[facility] GROUP HEMATOLOGY & ONCOLOGY  [address]  [place] [address] [phone]    Patient Name: Ricardo Hugo  Encounter Date: 01/23/2025  YOB: 1950   Patient Number: [id number]    Hematology / Oncology Progress Note      HPI / REASON FOR VISIT: Ricardo Hugo is a 74 y.o. male who is followed by this office for CLL, Iron Deficiency Anemia, Chronic Kidney Disease.  He sees Dr. Rueda for nephrology.      Bone marrow biopsy done 7/12/2021:  with a flow cytometry showing a 2% monoclonal B-cell kappa population consistent with CLL/SLL.    CLL panel:  Negative for a t (11;14)/CCND1-IGH rearrangement  Negative for deletion of MARIA on the long arm of chromosome 11 q. 22  Negative for trisomy 12  Negative for deletion of DLEU1, DLEU2 on the long arm of chromosome 13 q. 14 and monosomy 13  Negative for deletion T p53 in the short arm of chromosome 17 P 13  Cytogenetics showed normal male karyotype  Bone marrow biopsy and aspirate show involvement by chronic lymphocytic leukemia/small lymphocytic lymphoma and up to 5% of cellularity, mild hypercellular marrow with maturing trilineage hematopoiesis, erythroid hyperplasia and a mild atypical small lymphocytic infiltrate with a dominant nodular pattern  Storage iron is present    He has received Injectafer for WALE and Retacrit for anemia in CKD in the past.    I have reviewed the HPI and verified with the patient the accuracy of it. No changes to history since the information was documented.  Becky Camacho, APRN.  01/23/2025        INTERVAL HISTORY  The patient is a 74-year-old male who presents for evaluation of iron deficiency, anemia, chronic kidney disease, and Chronic Lymphocytic Leukemia (CLL).     History of Present Illness   Since last visit, he was hospitalized due to congestion, which required a 5-day stay. He is currently on a regimen of 10,000 units of medication every  other week, a dosage that was also administered during his recent hospital stay. He has expressed a preference to maintain this schedule rather than increasing the frequency to weekly administrations.            LABS    Lab Results - Last 18 Months   Lab Units 01/23/25  0856 01/16/25  0225 01/15/25  0214 01/14/25  0419 01/13/25  0439 01/12/25  0401 01/11/25  0522 01/10/25  1508   HEMOGLOBIN g/dL 8.4* 8.2* 8.9* 8.8* 9.0* 8.8*   < > 9.0*   HEMATOCRIT % 26.3* 26.2* 27.6* 28.5* 28.7* 28.6*   < > 28.6*   MCV fL 103.5* 103.1* 100.7* 103.6* 103.2* 104.8*   < > 103.2*   WBC 10*3/mm3 6.39 5.40 6.04 4.97 4.44 5.58   < > 5.17   RDW % 16.2* 15.9* 15.7* 15.9* 16.0* 16.3*   < > 16.4*   MPV fL 9.3 10.4 9.6 9.8 10.0 10.2   < > 10.9   PLATELETS 10*3/mm3 115* 81* 85* 83* 78* 94*   < > 80*   IMM GRAN % % 0.6* 0.7*  --  0.6* 0.5 0.9*  --  0.8*   NEUTROS ABS 10*3/mm3 4.65 3.28 4.93 3.30 2.95 3.90  --  3.98   LYMPHS ABS 10*3/mm3 0.77 1.09  --  0.80 0.66* 0.81  --  0.50*   MONOS ABS 10*3/mm3 0.63 0.63  --  0.55 0.55 0.62  --  0.41   EOS ABS 10*3/mm3 0.26 0.29 0.43* 0.23 0.22 0.16  --  0.18   BASOS ABS 10*3/mm3 0.04 0.07 0.12 0.06 0.04 0.04  --  0.06   IMMATURE GRANS (ABS) 10*3/mm3 0.04 0.04  --  0.03 0.02 0.05  --  0.04   NRBC /100 WBC 0.0 0.0  --  0.0 0.0 0.0  --  0.0   NEUTROPHIL % %  --   --  81.6*  --   --   --   --   --    MONOCYTES % %  --   --  4.1*  --   --   --   --   --    BASOPHIL % %  --   --  2.0*  --   --   --   --   --    ATYP LYMPH % %  --   --  2.0  --   --   --   --   --    ANISOCYTOSIS   --   --  Slight/1+  --   --   --   --   --     < > = values in this interval not displayed.       Lab Results - Last 18 Months   Lab Units 01/23/25  0856 01/16/25  0225 01/15/25  0214 01/14/25  0419 01/13/25  0439 01/12/25  0401   GLUCOSE mg/dL 104* 108* 109* 108* 100* 104*   SODIUM mmol/L 138 140 139 140 140 139   POTASSIUM mmol/L 3.7 4.0 4.2 4.1 4.0 4.1   CO2 mmol/L 20.0* 22.0 22.0 22.0 22.0 19.0*   CHLORIDE mmol/L 102 105 103 104 106  108*   ANION GAP mmol/L 16.0* 13.0 14.0 14.0 12.0 12.0   CREATININE mg/dL 3.65* 3.98* 3.81* 4.00* 3.55* 3.72*   BUN mg/dL 60* 79* 81* 77* 74* 76*   BUN / CREAT RATIO  16.4 19.8 21.3 19.3 20.8 20.4   CALCIUM mg/dL 9.1 8.7 8.8 8.6 8.4* 8.2*   ALK PHOS U/L 174* 142* 141* 140* 124* 133*   TOTAL PROTEIN g/dL 6.5 5.2* 5.3* 5.7* 5.7* 5.7*   ALT (SGPT) U/L 11 8 8 9 9 8   AST (SGOT) U/L 19 15 15 16 16 17   BILIRUBIN mg/dL 0.5 0.4 0.3 0.4 0.3 0.3   ALBUMIN g/dL 3.7 3.0* 3.1* 3.3* 3.4* 3.4*   GLOBULIN gm/dL 2.8 2.2 2.2 2.4 2.3 2.3       Lab Results - Last 18 Months   Lab Units 01/13/25  0439 12/11/24  0849 09/18/24  0835 08/21/24  0904 08/07/24  0850 05/22/24  0849 02/14/24  0937 11/08/23  0827   URIC ACID mg/dL 10.4* 9.0*  --  7.7*  --  9.0* 8.0* 7.5*   LDH U/L  --   --  182  --  191  --   --   --          Lab Results - Last 18 Months   Lab Units 01/23/25  0857 01/23/25  0856 01/13/25  0439 10/30/24  0841 09/18/24  0835 08/07/24  0850 05/08/24  0946 02/28/24  0850 02/14/24  0937   IRON mcg/dL  --  88 78 67 87 95 87  --  89   TIBC mcg/dL  --  249* 229* 273* 280* 282* 285*  --  235*   IRON SATURATION (TSAT) %  --  35 34 25 31 34 31  --  38   FERRITIN ng/mL  --  248.30  --  199.00 203.40 249.10 170.40  --  315.30   TSH uIU/mL  --   --  8.550*  --   --   --   --  5.790*  --    FOLATE ng/mL 7.20  --   --  10.30  --   --   --   --   --          PAST MEDICAL HISTORY:  ALLERGIES:  Allergies   Allergen Reactions    Ondansetron Anaphylaxis and GI Intolerance    Zofran [Ondansetron Hcl] Anaphylaxis    Lortab [Hydrocodone-Acetaminophen] Other (See Comments) and Hallucinations     CLOSTROPHOBIC    Allopurinol Other (See Comments)     Pain on right side     CURRENT MEDICATIONS:  Outpatient Encounter Medications as of 1/23/2025   Medication Sig Dispense Refill    albuterol sulfate  (90 Base) MCG/ACT inhaler Inhale 2 puffs 4 (Four) Times a Day As Needed for Wheezing or Shortness of Air.      ALPRAZolam (XANAX) 0.25 MG tablet Take 1  tablet by mouth Every Night.      aspirin (aspirin) 81 MG EC tablet Take 1 tablet by mouth Daily.      atorvastatin (LIPITOR) 10 MG tablet Take 1 tablet by mouth Daily. 90 tablet 3    azelastine (ASTELIN) 0.1 % nasal spray Administer 2 sprays into the nostril(s) as directed by provider As Needed for Rhinitis. Use in each nostril as directed      Budeson-Glycopyrrol-Formoterol (Breztri Aerosphere) 160-9-4.8 MCG/ACT aerosol inhaler Inhale 2 puffs 2 (Two) Times a Day. 1 each 3    calcitriol (ROCALTROL) 0.5 MCG capsule Take 1 capsule by mouth Daily for 30 days. 30 capsule 0    cefdinir (OMNICEF) 300 MG capsule Take 1 capsule by mouth Daily. 5 capsule 0    cholestyramine light 4 g packet Take 1 packet by mouth Daily.      cloNIDine (CATAPRES) 0.3 MG tablet Take 1 tablet by mouth 3 (Three) Times a Day. 270 tablet 2    cloNIDine (CATAPRES-TTS) 0.2 MG/24HR patch Place 1 patch on the skin as directed by provider 1 (One) Time Per Week. THURSDAYS      clopidogrel (PLAVIX) 75 MG tablet Take 1 tablet by mouth Daily.      Copper Gluconate (Copper Caps) 2 MG capsule Take 2 mg by mouth Daily.      docusate sodium (COLACE) 100 MG capsule Take 1 capsule by mouth 3 (Three) Times a Day As Needed for Constipation.      empagliflozin (JARDIANCE) 10 MG tablet tablet Take 1 tablet by mouth Daily. 30 tablet 0    fexofenadine (ALLEGRA) 180 MG tablet Take 1 tablet by mouth Daily.      fluticasone (FLONASE) 50 MCG/ACT nasal spray Administer 2 sprays into the nostril(s) as directed by provider Daily.      furosemide (LASIX) 40 MG tablet Take 1 tablet by mouth Daily. 30 tablet 0    hydrALAZINE (APRESOLINE) 100 MG tablet Take 1 tablet by mouth 3 (Three) Times a Day. 100mg daily      isosorbide dinitrate (ISORDIL) 10 MG tablet Take 1 tablet by mouth 3 (Three) Times a Day for 30 days. 90 tablet 0    levothyroxine (Synthroid) 75 MCG tablet Take 1 tablet by mouth Daily. 90 tablet 3    magnesium chloride ER 64 MG DR tablet Take  by mouth Daily.       melatonin 5 MG tablet tablet Take 2 tablets by mouth Every Night for 10 days. 20 tablet 0    mesalamine (APRISO) 0.375 g 24 hr capsule Take 4 capsules by mouth Daily.      montelukast (SINGULAIR) 10 MG tablet Take 1 tablet by mouth Every Night.      Multiple Vitamins-Minerals (PRESERVISION/LUTEIN) capsule Take 1 capsule by mouth 2 (two) times a day.      NIFEdipine CC (ADALAT CC) 60 MG 24 hr tablet Take 2 tablets by mouth Daily for 270 days. (Patient taking differently: Take 1 tablet by mouth Daily.) 90 tablet 5    omeprazole (priLOSEC) 20 MG capsule Take 1 capsule by mouth Daily.      oxymetazoline (AFRIN) 0.05 % nasal spray Administer 2 sprays into the nostril(s) as directed by provider As Needed for Congestion.      sodium bicarbonate 650 MG tablet Take 2 tablets by mouth Every 12 (Twelve) Hours for 30 days. 120 tablet 0    sodium bicarbonate 650 MG tablet Take 1 tablet by mouth Daily With Lunch for 30 days. 30 tablet 0    tamsulosin (FLOMAX) 0.4 MG capsule 24 hr capsule Take 1 capsule by mouth Every Night. 90 capsule 3    valsartan (DIOVAN) 320 MG tablet Take 1 tablet by mouth Daily.      vitamin D (ERGOCALCIFEROL) 1.25 MG (17560 UT) capsule capsule Take 1 capsule by mouth 1 (One) Time Per Week.       Facility-Administered Encounter Medications as of 1/23/2025   Medication Dose Route Frequency Provider Last Rate Last Admin    cyanocobalamin injection 1,000 mcg  1,000 mcg Intramuscular Q28 Days Ruthie Parra, APRN   1,000 mcg at 08/11/23 1123     ADULT ILLNESSES:  Patient Active Problem List   Diagnosis Code    Chronic rhinitis J31.0    Essential hypertension I10    Resistant hypertension I1A.0    Chronic diarrhea K52.9    Symptomatic anemia D64.9    Wellness examination Z00.00    PAD (peripheral artery disease) I73.9    IHD (ischemic heart disease) I25.9    Carotid stenosis I65.29    Stenosis of right carotid artery I65.21    Carotid stenosis, right I65.21    Diastolic dysfunction I51.89    CKD (chronic  kidney disease) stage 4, GFR 15-29 ml/min N18.4    Anemia due to chronic kidney disease N18.9, D63.1    Copper deficiency E61.0    Low iron  E61.1    CLL (chronic lymphocytic leukemia) C91.10    Perforation of left tympanic membrane H72.92    Mixed hyperlipidemia E78.2    Systolic murmur R01.1    Eustachian tube dysfunction, bilateral H69.93    Sensorineural hearing loss (SNHL), bilateral H90.3    Anticoagulated Z79.01    Nasal septal deviation J34.2    Hypertrophy of inferior nasal turbinate J34.3    Unilateral recurrent inguinal hernia without obstruction or gangrene K40.91    Bilateral inguinal hernia without obstruction or gangrene K40.20    Sleep difficulties G47.9    Dermatitis associated with moisture L30.8    Proteinuria R80.9    Acute on chronic diastolic congestive heart failure I50.33    Acute respiratory failure with hypoxia J96.01    Bradycardia R00.1    History of pneumonia, current treatment with cefdinir Z87.01    Abnormal TSH R79.89     SURGERIES:  Past Surgical History:   Procedure Laterality Date    ARTERY SURGERY  2021    right side on neck    CAROTID ENDARTERECTOMY Right 05/10/2021    Procedure: RIGHT CAROTID ENDARTERECTOMY WITH EEG;  Surgeon: Gil Pineda DO;  Location: [facility] OR ;  Service: Vascular;  Laterality: Right;    COLONOSCOPY N/A 07/02/2020    Procedure: COLONOSCOPY WITH ANESTHESIA;  Surgeon: Adrien Brewster MD;  Location: [facility] ENDOSCOPY;  Service: Gastroenterology;  Laterality: N/A;  pre op: diarrhea  post op: polyps  PCP: Joe Velasco MD    COLONOSCOPY N/A 10/13/2020    Procedure: COLONOSCOPY WITH ANESTHESIA;  Surgeon: Adrien Brewster MD;  Location: [facility] ENDOSCOPY;  Service: Gastroenterology;  Laterality: N/A;  Pre: Chronic Diarrhea, Crohn's  Post: AVM  Dr. Neftali Velasco  CO2 Inflation Used    COLONOSCOPY N/A 12/08/2023    Procedure: COLONOSCOPY WITH ANESTHESIA;  Surgeon: Adrien Brewster MD;  Location: [facility] ENDOSCOPY;  Service: Gastroenterology;   Laterality: N/A;  pre chrone's disease  post sub optimal prep, polyp, chrone's      CORONARY ARTERY BYPASS GRAFT  2003    x3    ENDOSCOPY N/A 11/02/2021    Procedure: ESOPHAGOGASTRODUODENOSCOPY WITH ANESTHESIA;  Surgeon: Bridger Bell MD;  Location: [facility] ENDOSCOPY;  Service: Gastroenterology;  Laterality: N/A;  pre anemia;gi bleed  post  gi bleed;schatski ring  Dr. ERIC Velasco    ENDOSCOPY N/A 10/10/2023    Procedure: ESOPHAGOGASTRODUODENOSCOPY WITH ANESTHESIA;  Surgeon: Adrien Brewster MD;  Location: [facility] ENDOSCOPY;  Service: Gastroenterology;  Laterality: N/A;  preop; anemia  postop esophagitis ; R/O barretts   PCP Randall Beata    EYE SURGERY Bilateral     catorac    INCISION AND DRAINAGE PERIRECTAL ABSCESS N/A 03/03/2017    Procedure: INCISION AND DRAINAGE OF JEET ANAL ABSCESS;  Surgeon: Lynette Smith MD;  Location: [facility] OR;  Service:     INGUINAL HERNIA REPAIR Bilateral 06/27/2023    Procedure: INGUINAL HERNIA BILATERAL REPAIR LAPAROSCOPIC WITH DAVINCI ROBOT WITH MESH;  Surgeon: Tahira Rivera MD;  Location: [facility] OR;  Service: Robotics - DaVinci;  Laterality: Bilateral;    MYRINGOTOMY W/ TUBES Left 04/17/2017    06/10/2016    TONSILLECTOMY      TOTAL HIP ARTHROPLASTY Right 2006     HEALTH MAINTENANCE ITEMS:  Health Maintenance Due   Topic Date Due    TDAP/TD VACCINES (1 - Tdap) Never done    Hepatitis B (1 of 3 - Risk 3-dose series) Never done    COVID-19 Vaccine (8 - 2024-25 season) 11/20/2024    ANNUAL WELLNESS VISIT  02/16/2025       <no information>  Last Completed Colonoscopy            COLORECTAL CANCER SCREENING (COLONOSCOPY - Every 3 Years) Next due on 12/8/2026 12/08/2023  COLONOSCOPY    12/08/2023  Surgical Procedure: COLONOSCOPY    10/28/2021  POC Occult Blood Stool    10/13/2020  COLONOSCOPY    10/13/2020  Surgical Procedure: COLONOSCOPY    Only the first 5 history entries have been loaded, but more history exists.                  Immunization History    Administered Date(s) Administered    ABRYSVO (RSV, 60+ or pregnant women 32-36 wks) 2023    COVID-19 (MODERNA) 12YRS+ (SPIKEVAX) 2023, 2024    COVID-19 (MODERNA) 1st,2nd,3rd Dose Monovalent 2021, 2021, 2021, 2022    COVID-19 (MODERNA) BIVALENT 12+YRS 2022, 2024    FLUAD TRI 65YR+ 2024    Flu Vaccine Split Quad 2019    Fluad Quad 65+ 2023, 2024    Fluzone (or Fluarix & Flulaval for VFC) >6mos 2020    Fluzone High-Dose 65+yrs 10/12/2021, 2022    Fluzone Quad >6mos (Multi-dose) 2018, 2019, 2020    Influenza TIV (IM) 10/18/2017    Influenza Whole 2015    Pneumococcal Conjugate 13-Valent (PCV13) 2014    Pneumococcal Conjugate 20-Valent (PCV20) 2024, 2024    Pneumococcal Polysaccharide (PPSV23) 2006    Shingrix 2021, 2022    Zostavax 2014     Last Completed Mammogram       This patient has no relevant Health Maintenance data.              FAMILY HISTORY:  Family History   Problem Relation Age of Onset    Breast cancer Mother     Dementia Father     Glaucoma Father     No Known Problems Daughter     Colon polyps Neg Hx     Colon cancer Neg Hx      SOCIAL HISTORY:  Social History     Socioeconomic History    Marital status:    Tobacco Use    Smoking status: Former     Current packs/day: 0.00     Average packs/day: 0.5 packs/day for 25.8 years (12.9 ttl pk-yrs)     Types: Cigarettes     Start date:      Quit date: 10/13/2013     Years since quittin.2     Passive exposure: Past    Smokeless tobacco: Never    Tobacco comments:     quit 2013   Vaping Use    Vaping status: Never Used   Substance and Sexual Activity    Alcohol use: Not Currently    Drug use: No    Sexual activity: Not Currently     Partners: Female       REVIEW OF SYSTEMS:  Review of Systems   Constitutional:  Negative for activity change, appetite change, fatigue, fever, unexpected weight  "gain and unexpected weight loss.   HENT:  Negative for dental problem, facial swelling, swollen glands and trouble swallowing.    Eyes:  Negative for double vision and discharge.   Respiratory:  Negative for cough, shortness of breath and wheezing.    Cardiovascular:  Negative for chest pain, palpitations and leg swelling.   Gastrointestinal:  Negative for abdominal pain, blood in stool, nausea and vomiting.   Endocrine: Negative.    Genitourinary:  Negative for dysuria and hematuria.   Musculoskeletal:  Negative for arthralgias and myalgias.   Skin:  Negative for rash, skin lesions and wound.   Allergic/Immunologic: Negative for immunocompromised state.   Neurological:  Negative for speech difficulty, light-headedness, headache, memory problem and confusion.   Hematological:  Negative for adenopathy.   Psychiatric/Behavioral:  Negative for self-injury, suicidal ideas and depressed mood. The patient is not nervous/anxious.        /58   Pulse 68   Temp 97.7 °F (36.5 °C)   Resp 16   Ht 182.9 cm (72\")   Wt 64.5 kg (142 lb 4.8 oz)   SpO2 91%   BMI 19.30 kg/m²  Body surface area is 1.84 meters squared.    Pain Score    01/23/25 0919   PainSc: 0-No pain           Physical Exam  Constitutional:       Appearance: Normal appearance.   HENT:      Head: Normocephalic and atraumatic.   Cardiovascular:      Rate and Rhythm: Normal rate and regular rhythm.   Pulmonary:      Effort: Pulmonary effort is normal.      Breath sounds: Normal breath sounds.   Abdominal:      General: Bowel sounds are normal.      Palpations: Abdomen is soft.   Musculoskeletal:      Right lower leg: No edema.      Left lower leg: No edema.   Skin:     General: Skin is warm and dry.   Neurological:      Mental Status: He is alert and oriented to person, place, and time.   Psychiatric:         Attention and Perception: Attention normal.         Mood and Affect: Mood normal.         Judgment: Judgment normal.          I have reviewed the " PHYSICAL EXAM and the accuracy of it. No changes since the information was documented.  Becky ZARAGOZA Doug, APRN 01/23/2025        Ricardo Hugo reports a pain score of 0.  No intervention indicated         ASSESSMENT / PLAN    1. Anemia due to stage 4 chronic kidney disease    2. CLL (chronic lymphocytic leukemia)          ASSESSMENT:       1.  CLL  - Clinical stage 0 from 7/20/2021:  Bone marrow biopsy done 7/12/2021:  with a flow cytometry showing a 2% monoclonal B-cell kappa population consistent with CLL/SLL.    CLL panel:  Negative for a t (11;14)/CCND1-IGH rearrangement  Negative for deletion of MARIA on the long arm of chromosome 11 q. 22  Negative for trisomy 12  Negative for deletion of DLEU1, DLEU2 on the long arm of chromosome 13 q. 14 and monosomy 13  Negative for deletion T p53 in the short arm of chromosome 17 P 13  Cytogenetics showed normal male karyotype  Bone marrow biopsy and aspirate show involvement by chronic lymphocytic leukemia/small lymphocytic lymphoma and up to 5% of cellularity, mild hypercellular marrow with maturing trilineage hematopoiesis, erythroid hyperplasia and a mild atypical small lymphocytic infiltrate with a dominant nodular pattern  Storage iron is present       2.  Anemia in Chronic Kidney Disease Stage IV  3.  Iron Deficiency Anemia   -Had Injectafer December 1, 2022  -Currently receiving Retacrit for anemia   -Will Continue Retacrit today and biweekly for Hgb < 11 Or HCT < 33   -Sees Dr. Francis, Nephrology ([facility] [place])  -Offered to increase frequency of injections to weekly.  Patient states he feels well and that he is okay to stay biweekly for now.  He will let me know if that changes.    4. Cirrhosis of Liver  5.  Thrombocytopenia  -History of chronic alcoholism  - Cirrhotic morphology/appearance of the liver on 07/12/21 ultrasound   -Likely exacerbating anemia and thrombocytopenia  -No s/s of bleeding r/t thrombocytopenia    -Stable for observation   Assessment &  Plan  1. Anemia: Stable. GFR 16.5, Alkaline phosphatase 174, Hemoglobin 8.4, Platelet count 115, MCV and MCH levels high.  - Administer 40,000 units of erythropoietin today  - Continue biweekly injections of 40,000 units  - Test B12 and folate levels  - Contact if lab results indicate changes for treatment adjustments    Follow-up  - Follow up in 4 to 5 months    PLAN:  Continue Retacrit 40K units  today and biweekly for Hgb < 11 or Hct < 33  -Continue current medications, treatment plans and follow up with PCP and any other specialist  Return to office with CBC, CMP, Iron Profile and Ferritin   Care discussed with patient.  Understanding expressed.  Patient agreeable with plan.    Becky Camacho, [place]  01/23/2025

## 2025-01-24 LAB — RENIN PLAS-CCNC: 2.62 NG/ML/HR (ref 0.17–5.38)

## 2025-01-28 RX ORDER — CALCITRIOL 0.5 UG/1
0.5 CAPSULE, LIQUID FILLED ORAL DAILY
Qty: 90 CAPSULE | Refills: 2 | Status: SHIPPED | OUTPATIENT
Start: 2025-01-28

## 2025-01-28 RX ORDER — ISOSORBIDE DINITRATE 10 MG/1
10 TABLET ORAL
Qty: 270 TABLET | Refills: 2 | Status: SHIPPED | OUTPATIENT
Start: 2025-01-28

## 2025-01-28 RX ORDER — FUROSEMIDE 40 MG/1
40 TABLET ORAL DAILY
Qty: 90 TABLET | Refills: 2 | Status: SHIPPED | OUTPATIENT
Start: 2025-01-28

## 2025-02-12 DIAGNOSIS — E55.9 VITAMIN D DEFICIENCY: ICD-10-CM

## 2025-02-12 DIAGNOSIS — N18.4 ANEMIA DUE TO STAGE 4 CHRONIC KIDNEY DISEASE: ICD-10-CM

## 2025-02-12 DIAGNOSIS — D63.1 ANEMIA DUE TO STAGE 4 CHRONIC KIDNEY DISEASE: ICD-10-CM

## 2025-02-12 DIAGNOSIS — N18.4 CKD (CHRONIC KIDNEY DISEASE) STAGE 4, GFR 15-29 ML/MIN: ICD-10-CM

## 2025-02-12 DIAGNOSIS — E78.2 MIXED HYPERLIPIDEMIA: ICD-10-CM

## 2025-02-12 DIAGNOSIS — I10 ESSENTIAL HYPERTENSION: ICD-10-CM

## 2025-02-12 DIAGNOSIS — Z79.899 ENCOUNTER FOR LONG-TERM CURRENT USE OF MEDICATION: ICD-10-CM

## 2025-02-17 ENCOUNTER — INFUSION (OUTPATIENT)
Dept: ONCOLOGY | Facility: HOSPITAL | Age: 75
End: 2025-02-17
Payer: MEDICARE

## 2025-02-17 ENCOUNTER — LAB (OUTPATIENT)
Dept: LAB | Facility: HOSPITAL | Age: 75
End: 2025-02-17
Payer: MEDICARE

## 2025-02-17 ENCOUNTER — TELEPHONE (OUTPATIENT)
Dept: ONCOLOGY | Facility: CLINIC | Age: 75
End: 2025-02-17

## 2025-02-17 VITALS
RESPIRATION RATE: 16 BRPM | DIASTOLIC BLOOD PRESSURE: 58 MMHG | HEIGHT: 72 IN | TEMPERATURE: 97.8 F | HEART RATE: 71 BPM | WEIGHT: 143 LBS | SYSTOLIC BLOOD PRESSURE: 178 MMHG | BODY MASS INDEX: 19.37 KG/M2 | OXYGEN SATURATION: 97 %

## 2025-02-17 DIAGNOSIS — I10 ESSENTIAL HYPERTENSION: ICD-10-CM

## 2025-02-17 DIAGNOSIS — N18.4 CKD (CHRONIC KIDNEY DISEASE) STAGE 4, GFR 15-29 ML/MIN: ICD-10-CM

## 2025-02-17 DIAGNOSIS — N18.4 ANEMIA DUE TO STAGE 4 CHRONIC KIDNEY DISEASE: Primary | ICD-10-CM

## 2025-02-17 DIAGNOSIS — D63.1 ANEMIA DUE TO STAGE 4 CHRONIC KIDNEY DISEASE: ICD-10-CM

## 2025-02-17 DIAGNOSIS — N18.4 ANEMIA DUE TO STAGE 4 CHRONIC KIDNEY DISEASE: ICD-10-CM

## 2025-02-17 DIAGNOSIS — E55.9 VITAMIN D DEFICIENCY: ICD-10-CM

## 2025-02-17 DIAGNOSIS — E78.2 MIXED HYPERLIPIDEMIA: ICD-10-CM

## 2025-02-17 DIAGNOSIS — D63.1 ANEMIA DUE TO STAGE 4 CHRONIC KIDNEY DISEASE: Primary | ICD-10-CM

## 2025-02-17 DIAGNOSIS — Z79.899 ENCOUNTER FOR LONG-TERM CURRENT USE OF MEDICATION: ICD-10-CM

## 2025-02-17 LAB
ALBUMIN SERPL-MCNC: 3.9 G/DL (ref 3.5–5.2)
ALBUMIN/GLOB SERPL: 1.3 G/DL
ALP SERPL-CCNC: 181 U/L (ref 39–117)
ALT SERPL W P-5'-P-CCNC: 19 U/L (ref 1–41)
ANION GAP SERPL CALCULATED.3IONS-SCNC: 15 MMOL/L (ref 5–15)
AST SERPL-CCNC: 26 U/L (ref 1–40)
BACTERIA UR QL AUTO: ABNORMAL /HPF
BASOPHILS # BLD AUTO: 0.05 10*3/MM3 (ref 0–0.2)
BASOPHILS NFR BLD AUTO: 1 % (ref 0–1.5)
BILIRUB SERPL-MCNC: 0.3 MG/DL (ref 0–1.2)
BILIRUB UR QL STRIP: NEGATIVE
BUN SERPL-MCNC: 61 MG/DL (ref 8–23)
BUN/CREAT SERPL: 15.5 (ref 7–25)
CALCIUM SPEC-SCNC: 8.9 MG/DL (ref 8.6–10.5)
CHLORIDE SERPL-SCNC: 103 MMOL/L (ref 98–107)
CHOLEST SERPL-MCNC: 107 MG/DL (ref 0–200)
CLARITY UR: CLEAR
CO2 SERPL-SCNC: 23 MMOL/L (ref 22–29)
COLOR UR: YELLOW
CREAT SERPL-MCNC: 3.93 MG/DL (ref 0.76–1.27)
DEPRECATED RDW RBC AUTO: 57.7 FL (ref 37–54)
EGFRCR SERPLBLD CKD-EPI 2021: 15.1 ML/MIN/1.73
EOSINOPHIL # BLD AUTO: 0.28 10*3/MM3 (ref 0–0.4)
EOSINOPHIL NFR BLD AUTO: 5.7 % (ref 0.3–6.2)
ERYTHROCYTE [DISTWIDTH] IN BLOOD BY AUTOMATED COUNT: 15.2 % (ref 12.3–15.4)
GLOBULIN UR ELPH-MCNC: 3.1 GM/DL
GLUCOSE SERPL-MCNC: 100 MG/DL (ref 65–99)
GLUCOSE UR STRIP-MCNC: NEGATIVE MG/DL
HCT VFR BLD AUTO: 27.4 % (ref 37.5–51)
HDLC SERPL-MCNC: 41 MG/DL (ref 40–60)
HGB BLD-MCNC: 8.5 G/DL (ref 13–17.7)
HGB UR QL STRIP.AUTO: NEGATIVE
HYALINE CASTS UR QL AUTO: ABNORMAL /LPF
IMM GRANULOCYTES # BLD AUTO: 0.02 10*3/MM3 (ref 0–0.05)
IMM GRANULOCYTES NFR BLD AUTO: 0.4 % (ref 0–0.5)
KETONES UR QL STRIP: NEGATIVE
LDLC SERPL CALC-MCNC: 54 MG/DL (ref 0–100)
LDLC/HDLC SERPL: 1.36 {RATIO}
LEUKOCYTE ESTERASE UR QL STRIP.AUTO: NEGATIVE
LYMPHOCYTES # BLD AUTO: 1.05 10*3/MM3 (ref 0.7–3.1)
LYMPHOCYTES NFR BLD AUTO: 21.3 % (ref 19.6–45.3)
MCH RBC QN AUTO: 32.3 PG (ref 26.6–33)
MCHC RBC AUTO-ENTMCNC: 31 G/DL (ref 31.5–35.7)
MCV RBC AUTO: 104.2 FL (ref 79–97)
MONOCYTES # BLD AUTO: 0.52 10*3/MM3 (ref 0.1–0.9)
MONOCYTES NFR BLD AUTO: 10.5 % (ref 5–12)
NEUTROPHILS NFR BLD AUTO: 3.02 10*3/MM3 (ref 1.7–7)
NEUTROPHILS NFR BLD AUTO: 61.1 % (ref 42.7–76)
NITRITE UR QL STRIP: NEGATIVE
NRBC BLD AUTO-RTO: 0 /100 WBC (ref 0–0.2)
PH UR STRIP.AUTO: 6.5 [PH] (ref 5–8)
PLATELET # BLD AUTO: 118 10*3/MM3 (ref 140–450)
PMV BLD AUTO: 9.6 FL (ref 6–12)
POTASSIUM SERPL-SCNC: 4 MMOL/L (ref 3.5–5.2)
PROT SERPL-MCNC: 7 G/DL (ref 6–8.5)
PROT UR QL STRIP: ABNORMAL
RBC # BLD AUTO: 2.63 10*6/MM3 (ref 4.14–5.8)
RBC # UR STRIP: ABNORMAL /HPF
REF LAB TEST METHOD: ABNORMAL
SODIUM SERPL-SCNC: 141 MMOL/L (ref 136–145)
SP GR UR STRIP: 1.01 (ref 1–1.03)
SQUAMOUS #/AREA URNS HPF: ABNORMAL /HPF
T4 FREE SERPL-MCNC: 1.16 NG/DL (ref 0.93–1.7)
TRIGL SERPL-MCNC: 52 MG/DL (ref 0–150)
TSH SERPL DL<=0.05 MIU/L-ACNC: 9.22 UIU/ML (ref 0.27–4.2)
UROBILINOGEN UR QL STRIP: ABNORMAL
VLDLC SERPL-MCNC: 12 MG/DL (ref 5–40)
WBC # UR STRIP: ABNORMAL /HPF
WBC NRBC COR # BLD AUTO: 4.94 10*3/MM3 (ref 3.4–10.8)

## 2025-02-17 PROCEDURE — 80053 COMPREHEN METABOLIC PANEL: CPT

## 2025-02-17 PROCEDURE — 85025 COMPLETE CBC W/AUTO DIFF WBC: CPT

## 2025-02-17 PROCEDURE — 84439 ASSAY OF FREE THYROXINE: CPT

## 2025-02-17 PROCEDURE — 81001 URINALYSIS AUTO W/SCOPE: CPT

## 2025-02-17 PROCEDURE — 82306 VITAMIN D 25 HYDROXY: CPT

## 2025-02-17 PROCEDURE — 96372 THER/PROPH/DIAG INJ SC/IM: CPT

## 2025-02-17 PROCEDURE — 36415 COLL VENOUS BLD VENIPUNCTURE: CPT

## 2025-02-17 PROCEDURE — 25010000002 EPOETIN ALFA-EPBX 40000 UNIT/ML SOLUTION: Performed by: NURSE PRACTITIONER

## 2025-02-17 PROCEDURE — 84443 ASSAY THYROID STIM HORMONE: CPT

## 2025-02-17 PROCEDURE — 80061 LIPID PANEL: CPT

## 2025-02-17 RX ORDER — DESOXIMETASONE 2.5 MG/G
1 CREAM TOPICAL 2 TIMES DAILY PRN
Qty: 15 G | Refills: 0 | Status: SHIPPED | OUTPATIENT
Start: 2025-02-17

## 2025-02-17 RX ADMIN — EPOETIN ALFA-EPBX 40000 UNITS: 40000 INJECTION, SOLUTION INTRAVENOUS; SUBCUTANEOUS at 14:06

## 2025-02-17 NOTE — TELEPHONE ENCOUNTER
"  Caller: Ricardo Hugo \"Harjinder\"    Relationship to patient: Self    Best call back number: [phone]    Chief complaint: PATIENT WANTED TO SEE IF HE CAN COME TODAY    Type of visit: LAB AND INJECTION     Requested date: WANTS TO COME TODAY      If rescheduling, when is the original appointment: 2-20-25     "

## 2025-02-18 ENCOUNTER — TELEPHONE (OUTPATIENT)
Dept: ONCOLOGY | Facility: CLINIC | Age: 75
End: 2025-02-18
Payer: MEDICARE

## 2025-02-18 LAB — 25(OH)D3 SERPL-MCNC: 66.4 NG/ML (ref 30–100)

## 2025-02-18 NOTE — TELEPHONE ENCOUNTER
----- Message from Becky Camacho sent at 2/17/2025  7:19 PM CST -----  Can you please see if he is willing to go to weekly Retacrit injections

## 2025-02-20 DIAGNOSIS — D63.1 ANEMIA DUE TO STAGE 4 CHRONIC KIDNEY DISEASE: Primary | ICD-10-CM

## 2025-02-20 DIAGNOSIS — N18.4 ANEMIA DUE TO STAGE 4 CHRONIC KIDNEY DISEASE: Primary | ICD-10-CM

## 2025-02-24 ENCOUNTER — LAB (OUTPATIENT)
Dept: LAB | Facility: HOSPITAL | Age: 75
End: 2025-02-24
Payer: MEDICARE

## 2025-02-24 ENCOUNTER — INFUSION (OUTPATIENT)
Dept: ONCOLOGY | Facility: HOSPITAL | Age: 75
End: 2025-02-24
Payer: MEDICARE

## 2025-02-24 VITALS
BODY MASS INDEX: 19.37 KG/M2 | DIASTOLIC BLOOD PRESSURE: 59 MMHG | OXYGEN SATURATION: 92 % | WEIGHT: 143 LBS | TEMPERATURE: 97.5 F | HEIGHT: 72 IN | RESPIRATION RATE: 18 BRPM | HEART RATE: 125 BPM | SYSTOLIC BLOOD PRESSURE: 198 MMHG

## 2025-02-24 DIAGNOSIS — D63.1 ANEMIA DUE TO STAGE 4 CHRONIC KIDNEY DISEASE: Primary | ICD-10-CM

## 2025-02-24 DIAGNOSIS — N18.4 ANEMIA DUE TO STAGE 4 CHRONIC KIDNEY DISEASE: Primary | ICD-10-CM

## 2025-02-24 LAB
BASOPHILS # BLD AUTO: 0.04 10*3/MM3 (ref 0–0.2)
BASOPHILS NFR BLD AUTO: 0.6 % (ref 0–1.5)
DEPRECATED RDW RBC AUTO: 61 FL (ref 37–54)
EOSINOPHIL # BLD AUTO: 0.15 10*3/MM3 (ref 0–0.4)
EOSINOPHIL NFR BLD AUTO: 2.4 % (ref 0.3–6.2)
ERYTHROCYTE [DISTWIDTH] IN BLOOD BY AUTOMATED COUNT: 16.2 % (ref 12.3–15.4)
HCT VFR BLD AUTO: 26.5 % (ref 37.5–51)
HGB BLD-MCNC: 8.3 G/DL (ref 13–17.7)
HOLD SPECIMEN: NORMAL
IMM GRANULOCYTES # BLD AUTO: 0.03 10*3/MM3 (ref 0–0.05)
IMM GRANULOCYTES NFR BLD AUTO: 0.5 % (ref 0–0.5)
LYMPHOCYTES # BLD AUTO: 1.04 10*3/MM3 (ref 0.7–3.1)
LYMPHOCYTES NFR BLD AUTO: 16.9 % (ref 19.6–45.3)
MCH RBC QN AUTO: 32.8 PG (ref 26.6–33)
MCHC RBC AUTO-ENTMCNC: 31.3 G/DL (ref 31.5–35.7)
MCV RBC AUTO: 104.7 FL (ref 79–97)
MONOCYTES # BLD AUTO: 0.58 10*3/MM3 (ref 0.1–0.9)
MONOCYTES NFR BLD AUTO: 9.4 % (ref 5–12)
NEUTROPHILS NFR BLD AUTO: 4.33 10*3/MM3 (ref 1.7–7)
NEUTROPHILS NFR BLD AUTO: 70.2 % (ref 42.7–76)
NRBC BLD AUTO-RTO: 0 /100 WBC (ref 0–0.2)
PLATELET # BLD AUTO: 122 10*3/MM3 (ref 140–450)
PMV BLD AUTO: 9.4 FL (ref 6–12)
RBC # BLD AUTO: 2.53 10*6/MM3 (ref 4.14–5.8)
WBC NRBC COR # BLD AUTO: 6.17 10*3/MM3 (ref 3.4–10.8)

## 2025-02-24 PROCEDURE — 36415 COLL VENOUS BLD VENIPUNCTURE: CPT

## 2025-02-24 PROCEDURE — 25010000002 EPOETIN ALFA-EPBX 40000 UNIT/ML SOLUTION: Performed by: NURSE PRACTITIONER

## 2025-02-24 PROCEDURE — 96372 THER/PROPH/DIAG INJ SC/IM: CPT

## 2025-02-24 PROCEDURE — 85025 COMPLETE CBC W/AUTO DIFF WBC: CPT

## 2025-02-24 RX ADMIN — EPOETIN ALFA-EPBX 40000 UNITS: 40000 INJECTION, SOLUTION INTRAVENOUS; SUBCUTANEOUS at 09:09

## 2025-02-25 ENCOUNTER — OFFICE VISIT (OUTPATIENT)
Dept: INTERNAL MEDICINE | Facility: CLINIC | Age: 75
End: 2025-02-25
Payer: MEDICARE

## 2025-02-25 VITALS
DIASTOLIC BLOOD PRESSURE: 56 MMHG | HEART RATE: 74 BPM | HEIGHT: 72 IN | BODY MASS INDEX: 19.26 KG/M2 | TEMPERATURE: 98.1 F | WEIGHT: 142.2 LBS | SYSTOLIC BLOOD PRESSURE: 144 MMHG | OXYGEN SATURATION: 97 %

## 2025-02-25 DIAGNOSIS — N18.4 CKD (CHRONIC KIDNEY DISEASE) STAGE 4, GFR 15-29 ML/MIN: ICD-10-CM

## 2025-02-25 DIAGNOSIS — I10 ESSENTIAL HYPERTENSION: ICD-10-CM

## 2025-02-25 DIAGNOSIS — D69.6 THROMBOCYTOPENIA: ICD-10-CM

## 2025-02-25 DIAGNOSIS — E78.2 MIXED HYPERLIPIDEMIA: ICD-10-CM

## 2025-02-25 DIAGNOSIS — I50.32 CHRONIC DIASTOLIC (CONGESTIVE) HEART FAILURE: ICD-10-CM

## 2025-02-25 DIAGNOSIS — I1A.0 RESISTANT HYPERTENSION: ICD-10-CM

## 2025-02-25 DIAGNOSIS — N25.81 SECONDARY HYPERPARATHYROIDISM OF RENAL ORIGIN: ICD-10-CM

## 2025-02-25 DIAGNOSIS — J44.9 STAGE 2 MODERATE COPD BY GOLD CLASSIFICATION: Primary | ICD-10-CM

## 2025-02-25 DIAGNOSIS — E03.9 HYPOTHYROIDISM, UNSPECIFIED TYPE: ICD-10-CM

## 2025-02-25 PROBLEM — J96.01 ACUTE RESPIRATORY FAILURE WITH HYPOXIA: Status: RESOLVED | Noted: 2025-01-10 | Resolved: 2025-02-25

## 2025-02-25 PROCEDURE — 1126F AMNT PAIN NOTED NONE PRSNT: CPT | Performed by: INTERNAL MEDICINE

## 2025-02-25 PROCEDURE — 3077F SYST BP >= 140 MM HG: CPT | Performed by: INTERNAL MEDICINE

## 2025-02-25 PROCEDURE — 3078F DIAST BP <80 MM HG: CPT | Performed by: INTERNAL MEDICINE

## 2025-02-25 PROCEDURE — 1170F FXNL STATUS ASSESSED: CPT | Performed by: INTERNAL MEDICINE

## 2025-02-25 PROCEDURE — G0439 PPPS, SUBSEQ VISIT: HCPCS | Performed by: INTERNAL MEDICINE

## 2025-02-25 PROCEDURE — 99214 OFFICE O/P EST MOD 30 MIN: CPT | Performed by: INTERNAL MEDICINE

## 2025-02-25 RX ORDER — FUROSEMIDE 20 MG/1
10 TABLET ORAL DAILY
Start: 2025-02-25

## 2025-02-25 RX ORDER — MESALAMINE 0.38 G/1
CAPSULE, EXTENDED RELEASE ORAL
Qty: 360 CAPSULE | Refills: 3 | OUTPATIENT
Start: 2025-02-25

## 2025-02-25 RX ORDER — SODIUM BICARBONATE 650 MG/1
650 TABLET ORAL 4 TIMES DAILY
COMMUNITY

## 2025-02-25 RX ORDER — SPIRONOLACTONE 25 MG/1
25 TABLET ORAL DAILY
Start: 2025-02-25 | End: 2025-02-27 | Stop reason: SDUPTHER

## 2025-02-25 RX ORDER — FUROSEMIDE 20 MG/1
10 TABLET ORAL DAILY
COMMUNITY
End: 2025-02-25

## 2025-02-25 RX ORDER — LEVOTHYROXINE SODIUM 88 UG/1
88 TABLET ORAL DAILY
Qty: 90 TABLET | Refills: 2 | Status: SHIPPED | OUTPATIENT
Start: 2025-02-25

## 2025-02-25 NOTE — PROGRESS NOTES
Subjective   The ABCs of the Annual Wellness Visit  Medicare Wellness Visit      Ricardo Hugo is a 75 y.o. patient who presents for a Medicare Wellness Visit.    The following portions of the patient's history were reviewed and   updated as appropriate: allergies, current medications, past family history, past medical history, past social history, past surgical history, and problem list.    Compared to one year ago, the patient's physical   health is the same.  Compared to one year ago, the patient's mental   health is the same.    Recent Hospitalizations:  This patient has had a [facility] admission record on file within the last 365 days.  Current Medical Providers:  Patient Care Team:  Nathan Zimmer MD as PCP - General (Internal Medicine)  Adrien Brewster MD as Consulting Physician (Gastroenterology)  Eleuterio Farley MD (Inactive) as Cardiologist (Cardiology)  Elena Jon APRN as Referring Physician (Vascular Surgery)  Bolivar Rueda MD as Consulting Physician (Nephrology)  Slava Tate APRN as Nurse Practitioner (Family Medicine)  New Omalley MD as Consulting Physician (Pulmonary Disease)  Becky Camacho APRN as Nurse Practitioner (Hematology and Oncology)  Gil Pineda DO as Consulting Physician (Vascular Surgery)    Outpatient Medications Prior to Visit   Medication Sig Dispense Refill    albuterol sulfate  (90 Base) MCG/ACT inhaler Inhale 2 puffs 4 (Four) Times a Day As Needed for Wheezing or Shortness of Air.      ALPRAZolam (XANAX) 0.25 MG tablet Take 1 tablet by mouth Every Night.      aspirin (aspirin) 81 MG EC tablet Take 1 tablet by mouth Daily.      atorvastatin (LIPITOR) 10 MG tablet Take 1 tablet by mouth Daily. 90 tablet 3    azelastine (ASTELIN) 0.1 % nasal spray Administer 2 sprays into the nostril(s) as directed by provider As Needed for Rhinitis. Use in each nostril as directed      calcitriol (ROCALTROL) 0.5  MCG capsule Take 1 capsule by mouth Daily. 90 capsule 2    cholestyramine light 4 g packet Take 1 packet by mouth Daily.      cloNIDine (CATAPRES) 0.3 MG tablet Take 1 tablet by mouth 3 (Three) Times a Day. 270 tablet 2    cloNIDine (CATAPRES-TTS) 0.2 MG/24HR patch Place 1 patch on the skin as directed by provider 1 (One) Time Per Week. THURSDAYS      clopidogrel (PLAVIX) 75 MG tablet Take 1 tablet by mouth Daily.      Copper Gluconate (Copper Caps) 2 MG capsule Take 2 mg by mouth Daily.      desoximetasone (TOPICORT) 0.25 % cream Apply 1 Application topically to the appropriate area as directed 2 (Two) Times a Day As Needed for Irritation. 15 g 0    docusate sodium (COLACE) 100 MG capsule Take 1 capsule by mouth 3 (Three) Times a Day As Needed for Constipation.      fexofenadine (ALLEGRA) 180 MG tablet Take 1 tablet by mouth Daily.      fluticasone (FLONASE) 50 MCG/ACT nasal spray Administer 2 sprays into the nostril(s) as directed by provider Daily.      furosemide (LASIX) 40 MG tablet Take 1 tablet by mouth Daily. 90 tablet 2    hydrALAZINE (APRESOLINE) 100 MG tablet Take 1 tablet by mouth 3 (Three) Times a Day. 100mg daily      isosorbide dinitrate (ISORDIL) 10 MG tablet Take 1 tablet by mouth 3 (Three) Times a Day. 270 tablet 2    magnesium chloride ER 64 MG DR tablet Take  by mouth Daily.      mesalamine (APRISO) 0.375 g 24 hr capsule Take 4 capsules by mouth Daily.      montelukast (SINGULAIR) 10 MG tablet Take 1 tablet by mouth Every Night.      Multiple Vitamins-Minerals (PRESERVISION/LUTEIN) capsule Take 1 capsule by mouth 2 (two) times a day.      NON FORMULARY Take 25 mg by mouth At Night As Needed. Zzzquill      omeprazole (priLOSEC) 20 MG capsule Take 1 capsule by mouth Daily.      oxymetazoline (AFRIN) 0.05 % nasal spray Administer 2 sprays into the nostril(s) as directed by provider As Needed for Congestion.      sodium bicarbonate 650 MG tablet Take 1 tablet by mouth 4 (Four) Times a Day.       tamsulosin (FLOMAX) 0.4 MG capsule 24 hr capsule Take 1 capsule by mouth Every Night. 90 capsule 3    valsartan (DIOVAN) 320 MG tablet Take 1 tablet by mouth Daily.      vitamin D (ERGOCALCIFEROL) 1.25 MG (90778 UT) capsule capsule Take 1 capsule by mouth 1 (One) Time Per Week.      furosemide (LASIX) 10 MG half tablet Take 1 half tablet by mouth Daily.      levothyroxine (Synthroid) 75 MCG tablet Take 1 tablet by mouth Daily. 90 tablet 3    NIFEdipine CC (ADALAT CC) 60 MG 24 hr tablet Take 1 tablet by mouth Daily for 270 days. 90 tablet 2    Budeson-Glycopyrrol-Formoterol (Breztri Aerosphere) 160-9-4.8 MCG/ACT aerosol inhaler Inhale 2 puffs 2 (Two) Times a Day. (Patient not taking: Reported on 2/25/2025) 1 each 3    cefdinir (OMNICEF) 300 MG capsule Take 1 capsule by mouth Daily. (Patient not taking: Reported on 2/25/2025) 5 capsule 0    empagliflozin (JARDIANCE) 10 MG tablet tablet Take 1 tablet by mouth Daily. (Patient not taking: Reported on 2/25/2025) 90 tablet 2     Facility-Administered Medications Prior to Visit   Medication Dose Route Frequency Provider Last Rate Last Admin    cyanocobalamin injection 1,000 mcg  1,000 mcg Intramuscular Q28 Days Ruthie Parra, APRN   1,000 mcg at 08/11/23 1123     No opioid medication identified on active medication list. I have reviewed chart for other potential  high risk medication/s and harmful drug interactions in the elderly.      Aspirin is on active medication list. Aspirin use is indicated based on review of current medical condition/s. Pros and cons of this therapy have been discussed today. Benefits of this medication outweigh potential harm.  Patient has been encouraged to continue taking this medication.  .      Patient Active Problem List   Diagnosis    Chronic rhinitis    Essential hypertension    Resistant hypertension    Chronic diarrhea    Symptomatic anemia    Wellness examination    PAD (peripheral artery disease)    IHD (ischemic heart disease)     "Carotid stenosis    Stenosis of right carotid artery    Carotid stenosis, right    Diastolic dysfunction    CKD (chronic kidney disease) stage 4, GFR 15-29 ml/min    Anemia due to chronic kidney disease    Copper deficiency    Low iron     CLL (chronic lymphocytic leukemia)    Perforation of left tympanic membrane    Mixed hyperlipidemia    Systolic murmur    Eustachian tube dysfunction, bilateral    Sensorineural hearing loss (SNHL), bilateral    Anticoagulated    Nasal septal deviation    Hypertrophy of inferior nasal turbinate    Unilateral recurrent inguinal hernia without obstruction or gangrene    Bilateral inguinal hernia without obstruction or gangrene    Sleep difficulties    Dermatitis associated with moisture    Proteinuria    Chronic diastolic (congestive) heart failure    Bradycardia    History of pneumonia, current treatment with cefdinir    Abnormal TSH     Advance Care Planning Advance Directive is not on file.  ACP discussion was held with the patient during this visit. Patient has an advance directive (not in EMR), copy requested.            Objective   Vitals:    02/25/25 0852   BP: 144/56   BP Location: Left arm   Patient Position: Sitting   Cuff Size: Adult   Pulse: 74   Temp: 98.1 °F (36.7 °C)   TempSrc: Temporal   SpO2: 97%   Weight: 64.5 kg (142 lb 3.2 oz)   Height: 182.9 cm (72\")   PainSc: 0-No pain       Estimated body mass index is 19.29 kg/m² as calculated from the following:    Height as of this encounter: 182.9 cm (72\").    Weight as of this encounter: 64.5 kg (142 lb 3.2 oz).    BMI is within normal parameters. No other follow-up for BMI required.           Does the patient have evidence of cognitive impairment? No  Lab Results   Component Value Date    TRIG 52 02/17/2025    HDL 41 02/17/2025    LDL 54 02/17/2025    VLDL 12 02/17/2025                                                                                               Health  Risk Assessment    Smoking Status:  Social History "     Tobacco Use   Smoking Status Former    Current packs/day: 0.00    Average packs/day: 0.5 packs/day for 25.8 years (12.9 ttl pk-yrs)    Types: Cigarettes    Start date:     Quit date: 10/13/2013    Years since quittin.3    Passive exposure: Past   Smokeless Tobacco Never   Tobacco Comments    quit      Alcohol Consumption:  Social History     Substance and Sexual Activity   Alcohol Use Not Currently       Fall Risk Screen  STEADI Fall Risk Assessment was completed, and patient is at LOW risk for falls.Assessment completed on:2025    Depression Screening   Little interest or pleasure in doing things? Not at all   Feeling down, depressed, or hopeless? Not at all   PHQ-2 Total Score 0      Health Habits and Functional and Cognitive Screenin/25/2025     8:49 AM   Functional & Cognitive Status   Do you have difficulty preparing food and eating? No   Do you have difficulty bathing yourself, getting dressed or grooming yourself? No   Do you have difficulty using the toilet? No   Do you have difficulty moving around from place to place? No   Do you have trouble with steps or getting out of a bed or a chair? No   Current Diet Well Balanced Diet   Dental Exam Up to date   Eye Exam Up to date   Exercise (times per week) 0 times per week   Current Exercises Include No Regular Exercise   Do you need help using the phone?  No   Are you deaf or do you have serious difficulty hearing?  Yes   Do you need help to go to places out of walking distance? No   Do you need help shopping? No   Do you need help preparing meals?  No   Do you need help with housework?  No   Do you need help with laundry? No   Do you need help taking your medications? No   Do you need help managing money? No   Do you ever drive or ride in a car without wearing a seat belt? No   Have you felt unusual stress, anger or loneliness in the last month? No   Who do you live with? Alone   If you need help, do you have trouble finding  someone available to you? No   Have you been bothered in the last four weeks by sexual problems? No   Do you have difficulty concentrating, remembering or making decisions? No           Age-appropriate Screening Schedule:  Refer to the list below for future screening recommendations based on patient's age, sex and/or medical conditions. Orders for these recommended tests are listed in the plan section. The patient has been provided with a written plan.    Health Maintenance List  Health Maintenance   Topic Date Due    TDAP/TD VACCINES (1 - Tdap) Never done    Hepatitis B (1 of 3 - Risk 3-dose series) Never done    COVID-19 Vaccine (8 - 2024-25 season) 11/20/2024    LIPID PANEL  02/17/2026    ANNUAL WELLNESS VISIT  02/25/2026    COLORECTAL CANCER SCREENING  12/08/2026    HEPATITIS C SCREENING  Completed    RSV Vaccine - Adults  Completed    INFLUENZA VACCINE  Completed    Pneumococcal Vaccine 50+  Completed    ZOSTER VACCINE  Completed                                                                                                                                                 CMS Preventative Services Quick Reference  Risk Factors Identified During Encounter  Immunizations Discussed/Encouraged: Tdap and COVID19  Dental Screening Recommended  Vision Screening Recommended    The above risks/problems have been discussed with the patient.  Pertinent information has been shared with the patient in the After Visit Summary.  An After Visit Summary and PPPS were made available to the patient.    Follow Up:   Next Medicare Wellness visit to be scheduled in 1 year.        Diagnoses and all orders for this visit:    1. Stage 2 moderate COPD by GOLD classification (Primary)    2. Secondary hyperparathyroidism of renal origin    3. Thrombocytopenia    4. Chronic diastolic (congestive) heart failure    5. Mixed hyperlipidemia    6. Resistant hypertension    7. Essential hypertension  -     NIFEdipine CC (ADALAT CC) 60 MG 24 hr  tablet; Take 2 tablets by mouth Daily for 270 days.  -     spironolactone (Aldactone) 25 MG tablet; Take 1 tablet by mouth Daily.    8. Hypothyroidism, unspecified type  -     levothyroxine (Synthroid) 88 MCG tablet; Take 1 tablet by mouth Daily.  Dispense: 90 tablet; Refill: 2    9. CKD (chronic kidney disease) stage 4, GFR 15-29 ml/min  -     furosemide (Lasix) 20 MG tablet; Take 0.5 tablets by mouth Daily.      Recommend at least annual dental and vision screening.  Recommend annual influenza vaccination  Recommend a varied diet and appropriate portion sizes.   CDC recommendations for physical activity:  At least 150 minutes a week (for example, 30 minutes a day, 5 days a week) of moderate-intensity activity such as brisk walking. Or can consider 75 minutes a week of vigorous-intensity activity such as hiking, jogging, or running.  At least 2 days a week of activities that strengthen muscles.  Plus activities to improve balance.    Health Maintenance Due   Topic Date Due    TDAP/TD VACCINES (1 - Tdap) Never done    Hepatitis B (1 of 3 - Risk 3-dose series) Never done    COVID-19 Vaccine (8 - 2024-25 season) 11/20/2024           Result Review :           Follow Up:   Return in about 3 months (around 5/25/2025), or if symptoms worsen or fail to improve, for follow up for above problems. Longitudinal care..     An After Visit Summary and PPPS were made available to the patient.                   MAHENDRA Zimmer MD, FACP, [facility]      Electronically signed by Nathan Zimmer MD, 02/25/25, 9:01 AM [facility].    Additional E&M Note during same encounter follows:  Patient has additional, significant, and separately identifiable condition(s)/problem(s) that require work above and beyond the Medicare Wellness Visit       Chief Complaint  Medicare Wellness-subsequent    Subjective        HPI  Ricardo Hugo is also being seen today for renal function  Patient here for the above problems.  See Assessment and Plan for further  "HPI components.        Objective   Vitals:    02/25/25 0852   BP: 144/56   BP Location: Left arm   Patient Position: Sitting   Cuff Size: Adult   Pulse: 74   Temp: 98.1 °F (36.7 °C)   TempSrc: Temporal   SpO2: 97%   Weight: 64.5 kg (142 lb 3.2 oz)   Height: 182.9 cm (72\")   PainSc: 0-No pain     Physical Exam  Vitals and nursing note reviewed.   Constitutional:       Appearance: He is not ill-appearing.   Eyes:      General: No scleral icterus.     Conjunctiva/sclera: Conjunctivae normal.   Cardiovascular:      Rate and Rhythm: Normal rate. Rhythm irregular.      Heart sounds: Murmur heard.   Pulmonary:      Effort: Pulmonary effort is normal. No respiratory distress.   Neurological:      General: No focal deficit present.      Mental Status: He is alert and oriented to person, place, and time.   Psychiatric:         Mood and Affect: Mood normal.         Behavior: Behavior normal.              The following data was reviewed by: Nathan Zimmer MD on 02/25/2025:  CMP          1/16/2025    02:25 1/23/2025    08:56 2/17/2025    13:40   CMP   Glucose 108  104  100    BUN 79  60  61    Creatinine 3.98  3.65  3.93    EGFR 14.9  16.5  15.1    Sodium 140  138  141    Potassium 4.0  3.7  4.0    Chloride 105  102  103    Calcium 8.7  9.1  8.9    Total Protein 5.2  6.5  7.0    Albumin 3.0  3.7  3.9    Globulin 2.2  2.8  3.1    Total Bilirubin 0.4  0.5  0.3    Alkaline Phosphatase 142  174  181    AST (SGOT) 15  19  26    ALT (SGPT) 8  11  19    Albumin/Globulin Ratio 1.4  1.3  1.3    BUN/Creatinine Ratio 19.8  16.4  15.5    Anion Gap 13.0  16.0  15.0      CBC w/diff          1/23/2025    08:56 2/17/2025    13:40 2/24/2025    08:38   CBC w/Diff   WBC 6.39  4.94  6.17    RBC 2.54  2.63  2.53    Hemoglobin 8.4  8.5  8.3    Hematocrit 26.3  27.4  26.5    .5  104.2  104.7    MCH 33.1  32.3  32.8    MCHC 31.9  31.0  31.3    RDW 16.2  15.2  16.2    Platelets 115  118  122    Neutrophil Rel % 72.7  61.1  70.2    Immature " Granulocyte Rel % 0.6  0.4  0.5    Lymphocyte Rel % 12.1  21.3  16.9    Monocyte Rel % 9.9  10.5  9.4    Eosinophil Rel % 4.1  5.7  2.4    Basophil Rel % 0.6  1.0  0.6      Lipid Panel          5/22/2024    08:49 1/12/2025    04:01 2/17/2025    13:40   Lipid Panel   Total Cholesterol 90  104  107    Triglycerides 57  59  52    HDL Cholesterol 37  31  41    VLDL Cholesterol 13  13  12    LDL Cholesterol  40  60  54    LDL/HDL Ratio 1.12  1.97  1.36      TSH          1/13/2025    04:39 2/17/2025    13:40   TSH   TSH 8.550  9.220      BMP          1/16/2025    02:25 1/23/2025    08:56 2/17/2025    13:40   BMP   BUN 79  60  61    Creatinine 3.98  3.65  3.93    Sodium 140  138  141    Potassium 4.0  3.7  4.0    Chloride 105  102  103    CO2 22.0  20.0  23.0    Calcium 8.7  9.1  8.9          UA          8/21/2024    09:06 1/12/2025    11:00 2/17/2025    14:15   Urinalysis   Squamous Epithelial Cells, UA 0-2  None Seen  None Seen    Specific [place], UA 1.014  1.010  1.014    Ketones, UA Negative  Negative  Negative    Blood, UA Negative  Negative  Negative    Leukocytes, UA Negative  Negative  Negative    Nitrite, UA Negative  Negative  Negative    RBC, UA 0-2  3-5  0-2    WBC, UA 0-2  0-2  3-5    Bacteria, UA None Seen  None Seen  None Seen                 Assessment and Plan   Diagnoses and all orders for this visit:    1. Stage 2 moderate COPD by GOLD classification (Primary)    2. Secondary hyperparathyroidism of renal origin    3. Thrombocytopenia    4. Chronic diastolic (congestive) heart failure    5. Mixed hyperlipidemia    6. Resistant hypertension    7. Essential hypertension  -     NIFEdipine CC (ADALAT CC) 60 MG 24 hr tablet; Take 2 tablets by mouth Daily for 270 days.  -     spironolactone (Aldactone) 25 MG tablet; Take 1 tablet by mouth Daily.    8. Hypothyroidism, unspecified type  -     levothyroxine (Synthroid) 88 MCG tablet; Take 1 tablet by mouth Daily.  Dispense: 90 tablet; Refill: 2    9. CKD (chronic  kidney disease) stage 4, GFR 15-29 ml/min  -     furosemide (Lasix) 20 MG tablet; Take 0.5 tablets by mouth Daily.    History of Present Illness  The patient is a 75-year-old gentleman presenting for a Medicare wellness visit.    He reports no significant change in his physical health compared to the previous year, with the exception of experiencing shortness of breath. He has an advanced directive or living will in place.    He has been receiving weekly laboratory tests and injections every Monday. He has a scheduled appointment with his nephrologist on 03/29/2025.    During his last hospitalization, Dr. Ayala made several adjustments to his medication regimen, including the addition of Jardiance. However, he discontinued this medication two weeks ago due to severe shortness of breath, which he attributes to the drug. He was also stopped on his spironolactone. His current medications include nifedipine 60 mg (two tablets at night), Lasix 40 mg and 10 mg, clonidine patch (two patches), valsartan 320 mg, clonidine 0.3 mg (three times daily), hydralazine 10 mg (three times daily), and Isordil 10 mg (three times daily).    MEDICATIONS  Current: nifedipine, Lasix, clonidine patch, valsartan, clonidine, hydralazine, Isordil, levothyroxine  Discontinued: Jardiance, spironolactone    Assessment & Plan  1. Medicare wellness visit.  His GFR has remained stable at approximately 15 since the end of the previous year. Cholesterol levels are within normal limits. The primary objective is to prevent the need for dialysis. He is advised to consider receiving a tetanus vaccine if he has not done so in the past decade.    2. Anemia.  His anemia is expected to improve, which should alleviate his shortness of breath. The goal is to increase his blood count to between 10 and 11, which should enhance his overall well-being and energy levels. He will continue to receive EPO agents every Monday to stimulate red blood cell  production.    3. Hypothyroidism.  His TSH level is elevated at 9.2. The dosage of levothyroxine will be increased from 75 mcg to 88 mcg. A prescription for levothyroxine 88 mcg will be sent to [facility]. His thyroid function will be monitored in 6 to 8 weeks.    4. Chronic kidney disease stage 4.  His GFR is stable at around 15. He is advised to maintain adequate hydration. He will continue to see his nephrologist, with the next appointment scheduled for 03/29/2025.    5. Hypertension.  He will resume taking spironolactone 25 mg. His blood pressure will be closely monitored, and adjustments to his nifedipine dosage will be made as necessary. He will continue his current regimen of nifedipine 60 mg (two tablets at night), Lasix 40 mg and 10 mg, clonidine patch, valsartan 320 mg, clonidine 0.3 mg (three times daily), hydralazine 10 mg (three times daily), and Isordil 10 mg (three times daily).    Medication management  Start back on spironolactone  Continue to hold jardiance as it was started when patient had a eGFR < 25.  Often not recommended for eGFR that low.  His spironolactone was also stopped and his BP has not been controlled.  Would recommend that we focus on improved BP control with spironolactone and hold on the jardiance based on eGFR being 15.  Discussed with his nephrology APRN who was agreeable with this plan.      Patient or patient representative verbalized consent for the use of Ambient Listening during the visit with  Nathan Zimmer MD for chart documentation. 2/25/2025  12:39 CST         Follow Up   Return in about 3 months (around 5/25/2025), or if symptoms worsen or fail to improve, for follow up for above problems. Longitudinal care..  Patient was given instructions and counseling regarding his condition or for health maintenance advice. Please see specific information pulled into the AVS if appropriate.   Patient or patient representative verbalized consent for the use of Ambient Listening  during the visit with  Nathan Zimmer MD for chart documentation. 2/25/2025  12:46 CST

## 2025-02-27 ENCOUNTER — OFFICE VISIT (OUTPATIENT)
Dept: CARDIOLOGY | Facility: CLINIC | Age: 75
End: 2025-02-27
Payer: MEDICARE

## 2025-02-27 VITALS
DIASTOLIC BLOOD PRESSURE: 60 MMHG | WEIGHT: 144 LBS | SYSTOLIC BLOOD PRESSURE: 162 MMHG | HEIGHT: 72 IN | HEART RATE: 66 BPM | OXYGEN SATURATION: 98 % | BODY MASS INDEX: 19.5 KG/M2

## 2025-02-27 DIAGNOSIS — I10 ESSENTIAL HYPERTENSION: Primary | ICD-10-CM

## 2025-02-27 DIAGNOSIS — E78.2 MIXED HYPERLIPIDEMIA: ICD-10-CM

## 2025-02-27 DIAGNOSIS — I1A.0 RESISTANT HYPERTENSION: ICD-10-CM

## 2025-02-27 DIAGNOSIS — N18.4 CKD (CHRONIC KIDNEY DISEASE) STAGE 4, GFR 15-29 ML/MIN: ICD-10-CM

## 2025-02-27 DIAGNOSIS — I50.32 CHRONIC DIASTOLIC (CONGESTIVE) HEART FAILURE: Primary | ICD-10-CM

## 2025-02-27 DIAGNOSIS — I73.9 PAD (PERIPHERAL ARTERY DISEASE): ICD-10-CM

## 2025-02-27 DIAGNOSIS — I10 ESSENTIAL HYPERTENSION: ICD-10-CM

## 2025-02-27 DIAGNOSIS — I25.9 IHD (ISCHEMIC HEART DISEASE): ICD-10-CM

## 2025-02-27 DIAGNOSIS — R01.1 SYSTOLIC MURMUR: Chronic | ICD-10-CM

## 2025-02-27 RX ORDER — SPIRONOLACTONE 25 MG/1
50 TABLET ORAL DAILY
Start: 2025-02-27

## 2025-03-03 ENCOUNTER — INFUSION (OUTPATIENT)
Dept: ONCOLOGY | Facility: HOSPITAL | Age: 75
End: 2025-03-03
Payer: MEDICARE

## 2025-03-03 ENCOUNTER — LAB (OUTPATIENT)
Dept: LAB | Facility: HOSPITAL | Age: 75
End: 2025-03-03
Payer: MEDICARE

## 2025-03-03 VITALS
SYSTOLIC BLOOD PRESSURE: 151 MMHG | OXYGEN SATURATION: 96 % | WEIGHT: 143.6 LBS | HEART RATE: 63 BPM | BODY MASS INDEX: 19.47 KG/M2 | TEMPERATURE: 98.2 F | DIASTOLIC BLOOD PRESSURE: 71 MMHG | RESPIRATION RATE: 18 BRPM

## 2025-03-03 DIAGNOSIS — N18.4 ANEMIA DUE TO STAGE 4 CHRONIC KIDNEY DISEASE: ICD-10-CM

## 2025-03-03 DIAGNOSIS — N18.4 ANEMIA DUE TO STAGE 4 CHRONIC KIDNEY DISEASE: Primary | ICD-10-CM

## 2025-03-03 DIAGNOSIS — D50.9 IRON DEFICIENCY ANEMIA, UNSPECIFIED IRON DEFICIENCY ANEMIA TYPE: ICD-10-CM

## 2025-03-03 DIAGNOSIS — D63.1 ANEMIA DUE TO STAGE 4 CHRONIC KIDNEY DISEASE: Primary | ICD-10-CM

## 2025-03-03 DIAGNOSIS — D63.1 ANEMIA DUE TO STAGE 4 CHRONIC KIDNEY DISEASE: ICD-10-CM

## 2025-03-03 LAB
ALBUMIN SERPL-MCNC: 3.7 G/DL (ref 3.5–5.2)
ALBUMIN/GLOB SERPL: 1.2 G/DL
ALP SERPL-CCNC: 165 U/L (ref 39–117)
ALT SERPL W P-5'-P-CCNC: 14 U/L (ref 1–41)
ANION GAP SERPL CALCULATED.3IONS-SCNC: 17 MMOL/L (ref 5–15)
ANISOCYTOSIS BLD QL: ABNORMAL
AST SERPL-CCNC: 19 U/L (ref 1–40)
BASOPHILS # BLD MANUAL: 0.06 10*3/MM3 (ref 0–0.2)
BASOPHILS NFR BLD MANUAL: 1 % (ref 0–1.5)
BILIRUB SERPL-MCNC: 0.4 MG/DL (ref 0–1.2)
BUN SERPL-MCNC: 60 MG/DL (ref 8–23)
BUN/CREAT SERPL: 13.5 (ref 7–25)
CALCIUM SPEC-SCNC: 9.3 MG/DL (ref 8.6–10.5)
CHLORIDE SERPL-SCNC: 106 MMOL/L (ref 98–107)
CO2 SERPL-SCNC: 21 MMOL/L (ref 22–29)
CREAT SERPL-MCNC: 4.45 MG/DL (ref 0.76–1.27)
DACRYOCYTES BLD QL SMEAR: ABNORMAL
DEPRECATED RDW RBC AUTO: 62.6 FL (ref 37–54)
EGFRCR SERPLBLD CKD-EPI 2021: 12.9 ML/MIN/1.73
EOSINOPHIL # BLD MANUAL: 0.28 10*3/MM3 (ref 0–0.4)
EOSINOPHIL NFR BLD MANUAL: 5 % (ref 0.3–6.2)
ERYTHROCYTE [DISTWIDTH] IN BLOOD BY AUTOMATED COUNT: 16.6 % (ref 12.3–15.4)
GLOBULIN UR ELPH-MCNC: 3.2 GM/DL
GLUCOSE SERPL-MCNC: 110 MG/DL (ref 65–99)
HCT VFR BLD AUTO: 28.2 % (ref 37.5–51)
HGB BLD-MCNC: 8.9 G/DL (ref 13–17.7)
LYMPHOCYTES # BLD MANUAL: 0.9 10*3/MM3 (ref 0.7–3.1)
LYMPHOCYTES NFR BLD MANUAL: 9 % (ref 5–12)
MACROCYTES BLD QL SMEAR: ABNORMAL
MCH RBC QN AUTO: 33.3 PG (ref 26.6–33)
MCHC RBC AUTO-ENTMCNC: 31.6 G/DL (ref 31.5–35.7)
MCV RBC AUTO: 105.6 FL (ref 79–97)
MONOCYTES # BLD: 0.51 10*3/MM3 (ref 0.1–0.9)
NEUTROPHILS # BLD AUTO: 3.89 10*3/MM3 (ref 1.7–7)
NEUTROPHILS NFR BLD MANUAL: 69 % (ref 42.7–76)
OVALOCYTES BLD QL SMEAR: ABNORMAL
PLAT MORPH BLD: NORMAL
PLATELET # BLD AUTO: 140 10*3/MM3 (ref 140–450)
PMV BLD AUTO: 9.6 FL (ref 6–12)
POIKILOCYTOSIS BLD QL SMEAR: ABNORMAL
POLYCHROMASIA BLD QL SMEAR: ABNORMAL
POTASSIUM SERPL-SCNC: 4.1 MMOL/L (ref 3.5–5.2)
PROT SERPL-MCNC: 6.9 G/DL (ref 6–8.5)
RBC # BLD AUTO: 2.67 10*6/MM3 (ref 4.14–5.8)
SODIUM SERPL-SCNC: 144 MMOL/L (ref 136–145)
VARIANT LYMPHS NFR BLD MANUAL: 11 % (ref 19.6–45.3)
VARIANT LYMPHS NFR BLD MANUAL: 5 % (ref 0–5)
WBC MORPH BLD: NORMAL
WBC NRBC COR # BLD AUTO: 5.64 10*3/MM3 (ref 3.4–10.8)

## 2025-03-03 PROCEDURE — 36415 COLL VENOUS BLD VENIPUNCTURE: CPT

## 2025-03-03 PROCEDURE — 96372 THER/PROPH/DIAG INJ SC/IM: CPT

## 2025-03-03 PROCEDURE — 85007 BL SMEAR W/DIFF WBC COUNT: CPT

## 2025-03-03 PROCEDURE — 82728 ASSAY OF FERRITIN: CPT

## 2025-03-03 PROCEDURE — 83540 ASSAY OF IRON: CPT

## 2025-03-03 PROCEDURE — 25010000002 EPOETIN ALFA-EPBX 40000 UNIT/ML SOLUTION: Performed by: NURSE PRACTITIONER

## 2025-03-03 PROCEDURE — 85025 COMPLETE CBC W/AUTO DIFF WBC: CPT

## 2025-03-03 PROCEDURE — 84466 ASSAY OF TRANSFERRIN: CPT

## 2025-03-03 PROCEDURE — 80053 COMPREHEN METABOLIC PANEL: CPT

## 2025-03-03 RX ORDER — FLUTICASONE PROPIONATE 50 MCG
SPRAY, SUSPENSION (ML) NASAL
Qty: 48 G | Refills: 3 | Status: SHIPPED | OUTPATIENT
Start: 2025-03-03

## 2025-03-03 RX ADMIN — EPOETIN ALFA-EPBX 40000 UNITS: 40000 INJECTION, SOLUTION INTRAVENOUS; SUBCUTANEOUS at 11:20

## 2025-03-04 ENCOUNTER — TELEPHONE (OUTPATIENT)
Dept: CARDIOLOGY | Facility: CLINIC | Age: 75
End: 2025-03-04
Payer: MEDICARE

## 2025-03-04 ENCOUNTER — TELEPHONE (OUTPATIENT)
Dept: INTERNAL MEDICINE | Facility: CLINIC | Age: 75
End: 2025-03-04

## 2025-03-04 DIAGNOSIS — R30.0 DYSURIA: Primary | ICD-10-CM

## 2025-03-04 NOTE — TELEPHONE ENCOUNTER
"  Caller: Ricardo Hugo \"Harjinder\"    Relationship: Self    Best call back number: [phone]     What is the best time to reach you: ANYTIME    Who are you requesting to speak with (clinical staff, provider,  specific staff member): CLINICAL    Do you know the name of the person who called: HARJINDER    What was the call regarding: HARJINDER WOULD LIKE TO SEE IF HE CAN SCHEDULE A BLADDER TEST FOR 3/11/25 AT 8AM, BECAUSE HE THINKS HE MAY HAVE A BLADDER INFECTION.    HE IS HAVING AN ULTRASOUND AT [facility] THAT MORNING ON (3/11@8AM) AND WOULD LIKE IT DONE AT THE SAME TIME.    Is it okay if the provider responds through IT MOVES IThart: NO      "

## 2025-03-04 NOTE — TELEPHONE ENCOUNTER
"Pt states when he saw Ruthie about 6 weeks ago, she suspected he may have a bladder infection, but when he went to [facility], he was unable to urinate to give a sample to be tested.  He is c/o a \"little bit of pain\" with urination and would like to submit a urine sample when he is there for the ultrasound ordered by cardiology.  Ok to order UA with C&S?  "

## 2025-03-04 NOTE — TELEPHONE ENCOUNTER
This order has been placed for him -I saw him back in December, at that time I was worried he was retaining urine and I actually ordered a bladder ultrasound as well as a urinalysis, the order for the bladder ultrasound is still active since he is going up there tomorrow please encourage him to get that completed as well related to concern of underlying retention could be a contributing factor to recurrent UTIs.

## 2025-03-04 NOTE — TELEPHONE ENCOUNTER
"Caller: Ricardo Hugo \"Harjinder\"    Relationship: Self    Best call back number: [phone]     What orders are you requesting (i.e. lab or imaging): BLADDER INFECTION TEST    In what timeframe would the patient need to come in: 03.11.25    Where will you receive your lab/imaging services: [facility]    Additional notes: PATIENT BELIEVES HE HAS A BLADDER INFECTION. HE WOULD LIKE TO BE TESTED FOR THAT AT HIS UPCOMING ULTRASOUND APPOINTMENTS ON 03.11.25.        "

## 2025-03-05 DIAGNOSIS — D50.9 IRON DEFICIENCY ANEMIA, UNSPECIFIED IRON DEFICIENCY ANEMIA TYPE: Primary | ICD-10-CM

## 2025-03-05 LAB
FERRITIN SERPL-MCNC: 180.9 NG/ML (ref 30–400)
IRON 24H UR-MRATE: 64 MCG/DL (ref 59–158)
IRON SATN MFR SERPL: 21 % (ref 20–50)
TIBC SERPL-MCNC: 310 MCG/DL (ref 298–536)
TRANSFERRIN SERPL-MCNC: 208 MG/DL (ref 200–360)

## 2025-03-07 NOTE — TELEPHONE ENCOUNTER
Called pt and instructed and he voiced understanding.  Went over with him that when he arrives at [facility], to inform that he is there for lab, ultrasounds by Dr. Mcguire, and bladder u/s by Ruthie.

## 2025-03-10 ENCOUNTER — LAB (OUTPATIENT)
Dept: LAB | Facility: HOSPITAL | Age: 75
End: 2025-03-10
Payer: MEDICARE

## 2025-03-10 ENCOUNTER — INFUSION (OUTPATIENT)
Dept: ONCOLOGY | Facility: HOSPITAL | Age: 75
End: 2025-03-10
Payer: MEDICARE

## 2025-03-10 VITALS
WEIGHT: 141.8 LBS | OXYGEN SATURATION: 96 % | SYSTOLIC BLOOD PRESSURE: 148 MMHG | HEART RATE: 54 BPM | RESPIRATION RATE: 18 BRPM | DIASTOLIC BLOOD PRESSURE: 48 MMHG | BODY MASS INDEX: 19.23 KG/M2 | TEMPERATURE: 97.5 F

## 2025-03-10 DIAGNOSIS — D63.1 ANEMIA DUE TO STAGE 4 CHRONIC KIDNEY DISEASE: Primary | ICD-10-CM

## 2025-03-10 DIAGNOSIS — K50.10 CROHN'S DISEASE OF LARGE INTESTINE WITHOUT COMPLICATION: Primary | ICD-10-CM

## 2025-03-10 DIAGNOSIS — K52.9 CHRONIC DIARRHEA: ICD-10-CM

## 2025-03-10 DIAGNOSIS — N18.4 ANEMIA DUE TO STAGE 4 CHRONIC KIDNEY DISEASE: ICD-10-CM

## 2025-03-10 DIAGNOSIS — D63.1 ANEMIA DUE TO STAGE 4 CHRONIC KIDNEY DISEASE: ICD-10-CM

## 2025-03-10 DIAGNOSIS — N18.4 ANEMIA DUE TO STAGE 4 CHRONIC KIDNEY DISEASE: Primary | ICD-10-CM

## 2025-03-10 LAB
BASOPHILS # BLD AUTO: 0.05 10*3/MM3 (ref 0–0.2)
BASOPHILS NFR BLD AUTO: 0.9 % (ref 0–1.5)
DEPRECATED RDW RBC AUTO: 61.1 FL (ref 37–54)
EOSINOPHIL # BLD AUTO: 0.34 10*3/MM3 (ref 0–0.4)
EOSINOPHIL NFR BLD AUTO: 6.3 % (ref 0.3–6.2)
ERYTHROCYTE [DISTWIDTH] IN BLOOD BY AUTOMATED COUNT: 16.2 % (ref 12.3–15.4)
HCT VFR BLD AUTO: 28.2 % (ref 37.5–51)
HGB BLD-MCNC: 8.7 G/DL (ref 13–17.7)
IMM GRANULOCYTES # BLD AUTO: 0.03 10*3/MM3 (ref 0–0.05)
IMM GRANULOCYTES NFR BLD AUTO: 0.6 % (ref 0–0.5)
LYMPHOCYTES # BLD AUTO: 0.88 10*3/MM3 (ref 0.7–3.1)
LYMPHOCYTES NFR BLD AUTO: 16.2 % (ref 19.6–45.3)
MCH RBC QN AUTO: 32.7 PG (ref 26.6–33)
MCHC RBC AUTO-ENTMCNC: 30.9 G/DL (ref 31.5–35.7)
MCV RBC AUTO: 106 FL (ref 79–97)
MONOCYTES # BLD AUTO: 0.67 10*3/MM3 (ref 0.1–0.9)
MONOCYTES NFR BLD AUTO: 12.4 % (ref 5–12)
NEUTROPHILS NFR BLD AUTO: 3.45 10*3/MM3 (ref 1.7–7)
NEUTROPHILS NFR BLD AUTO: 63.6 % (ref 42.7–76)
PLATELET # BLD AUTO: 140 10*3/MM3 (ref 140–450)
PMV BLD AUTO: 9.9 FL (ref 6–12)
RBC # BLD AUTO: 2.66 10*6/MM3 (ref 4.14–5.8)
WBC NRBC COR # BLD AUTO: 5.42 10*3/MM3 (ref 3.4–10.8)

## 2025-03-10 PROCEDURE — 25010000002 EPOETIN ALFA-EPBX 40000 UNIT/ML SOLUTION: Performed by: NURSE PRACTITIONER

## 2025-03-10 PROCEDURE — 36415 COLL VENOUS BLD VENIPUNCTURE: CPT

## 2025-03-10 PROCEDURE — 96372 THER/PROPH/DIAG INJ SC/IM: CPT

## 2025-03-10 PROCEDURE — 85025 COMPLETE CBC W/AUTO DIFF WBC: CPT

## 2025-03-10 RX ADMIN — EPOETIN ALFA-EPBX 40000 UNITS: 40000 INJECTION, SOLUTION INTRAVENOUS; SUBCUTANEOUS at 09:12

## 2025-03-10 NOTE — TELEPHONE ENCOUNTER
Rx Refill Note  Requested Prescriptions     Pending Prescriptions Disp Refills    cholestyramine light 4 g packet 90 packet 1     Sig: Take 1 packet by mouth Daily.    mesalamine (APRISO) 0.375 g 24 hr capsule 360 capsule 1     Sig: Take 4 capsules by mouth Daily.      Last office visit with prescribing clinician: 12/8/2023 for colon    Last telemedicine visit with prescribing clinician: Visit date not found     Next office visit with prescribing clinician: 7/3/2025    Pt has upcoming follow up with Dr. Brewster and sent [employer] message that he needs refills on Mesalamine and Cholestyramine. I have pended refills to Dr. Brewster.                            Would you like a call back once the refill request has been completed: [] Yes [] No    If the office needs to give you a call back, can they leave a voicemail: [] Yes [] No    Stacy Velazquez MA  03/10/25, 10:59 CDT

## 2025-03-11 ENCOUNTER — HOSPITAL ENCOUNTER (OUTPATIENT)
Dept: ULTRASOUND IMAGING | Facility: HOSPITAL | Age: 75
Discharge: HOME | End: 2025-03-11
Payer: MEDICARE

## 2025-03-11 ENCOUNTER — LAB (OUTPATIENT)
Dept: LAB | Facility: HOSPITAL | Age: 75
End: 2025-03-11
Payer: MEDICARE

## 2025-03-11 DIAGNOSIS — I10 ESSENTIAL HYPERTENSION: ICD-10-CM

## 2025-03-11 DIAGNOSIS — N40.1 BENIGN PROSTATIC HYPERPLASIA WITH INCOMPLETE BLADDER EMPTYING: ICD-10-CM

## 2025-03-11 DIAGNOSIS — R39.14 BENIGN PROSTATIC HYPERPLASIA WITH INCOMPLETE BLADDER EMPTYING: ICD-10-CM

## 2025-03-11 DIAGNOSIS — Z12.5 SCREENING PSA (PROSTATE SPECIFIC ANTIGEN): Primary | ICD-10-CM

## 2025-03-11 DIAGNOSIS — R30.0 DYSURIA: ICD-10-CM

## 2025-03-11 LAB
BACTERIA UR QL AUTO: NORMAL /HPF
BILIRUB UR QL STRIP: NEGATIVE
CLARITY UR: CLEAR
COLOR UR: YELLOW
GLUCOSE UR STRIP-MCNC: NEGATIVE MG/DL
HGB UR QL STRIP.AUTO: NEGATIVE
HYALINE CASTS UR QL AUTO: NORMAL /LPF
KETONES UR QL STRIP: NEGATIVE
LEUKOCYTE ESTERASE UR QL STRIP.AUTO: NEGATIVE
MUCOUS THREADS URNS QL MICRO: NORMAL /HPF
NITRITE UR QL STRIP: NEGATIVE
PH UR STRIP.AUTO: 6.5 [PH] (ref 5–8)
PROT UR QL STRIP: ABNORMAL
RBC # UR STRIP: NORMAL /HPF
REF LAB TEST METHOD: NORMAL
SP GR UR STRIP: 1.02 (ref 1–1.03)
SQUAMOUS #/AREA URNS HPF: NORMAL /HPF
UROBILINOGEN UR QL STRIP: ABNORMAL
WBC # UR STRIP: NORMAL /HPF

## 2025-03-11 PROCEDURE — 81001 URINALYSIS AUTO W/SCOPE: CPT

## 2025-03-11 PROCEDURE — 51798 US URINE CAPACITY MEASURE: CPT

## 2025-03-11 RX ORDER — TAMSULOSIN HYDROCHLORIDE 0.4 MG/1
2 CAPSULE ORAL NIGHTLY
Start: 2025-03-11

## 2025-03-12 RX ORDER — MESALAMINE 0.38 G/1
1.5 CAPSULE, EXTENDED RELEASE ORAL DAILY
Qty: 360 CAPSULE | Refills: 1 | Status: SHIPPED | OUTPATIENT
Start: 2025-03-12

## 2025-03-12 RX ORDER — CHOLESTYRAMINE LIGHT 4 G/5.7G
4 POWDER, FOR SUSPENSION ORAL DAILY
Qty: 90 PACKET | Refills: 1 | Status: SHIPPED | OUTPATIENT
Start: 2025-03-12

## 2025-03-13 ENCOUNTER — HOSPITAL ENCOUNTER (OUTPATIENT)
Dept: GENERAL RADIOLOGY | Facility: HOSPITAL | Age: 75
Discharge: HOME | End: 2025-03-13
Admitting: INTERNAL MEDICINE
Payer: MEDICARE

## 2025-03-13 DIAGNOSIS — I50.32 CHRONIC DIASTOLIC (CONGESTIVE) HEART FAILURE: ICD-10-CM

## 2025-03-13 DIAGNOSIS — R06.02 SOB (SHORTNESS OF BREATH) ON EXERTION: ICD-10-CM

## 2025-03-13 DIAGNOSIS — D50.9 IRON DEFICIENCY ANEMIA, UNSPECIFIED IRON DEFICIENCY ANEMIA TYPE: Primary | ICD-10-CM

## 2025-03-13 PROCEDURE — 71046 X-RAY EXAM CHEST 2 VIEWS: CPT

## 2025-03-16 NOTE — PROGRESS NOTES
Subjective:     Encounter Date:02/27/2025      Patient ID: Ricardo Hugo is a 75 y.o. male.    Chief Complaint:  History of Present Illness  History of Present Illness  The patient presents for evaluation of hypertension.    He reports a general sense of well-being, although he experiences shortness of breath. He is not experiencing any chest discomfort or pain. The severity of his breathlessness has remained consistent over the past year. His blood pressure remains uncontrolled, with recent readings of 162 and 198. He was seen by Dr. Zimmer a few days ago, who adjusted his medication regimen. He is currently on clonidine 4 times daily, Lasix daily, hydralazine 3 times daily, isosorbide 3 times daily, nifedipine 2 tablets at night, and spironolactone 25 mg. He has discontinued Jardiance due to its association with shortness of breath.    MEDICATIONS  Current: Clonidine, Lasix, hydralazine, isosorbide, nifedipine, spironolactone  Discontinued: Jardiance    The following portions of the patient's history were reviewed and updated as appropriate: allergies, current medications, past family history, past medical history, past social history, past surgical history, and problem list.    Review of Systems   Constitutional: Negative for diaphoresis, fever and malaise/fatigue.   HENT:  Negative for congestion.    Eyes:  Negative for vision loss in left eye and vision loss in right eye.   Cardiovascular:  Negative for chest pain, claudication, dyspnea on exertion, irregular heartbeat, leg swelling, orthopnea, palpitations and syncope.   Respiratory:  Positive for shortness of breath. Negative for cough and wheezing.    Hematologic/Lymphatic: Negative for adenopathy.   Skin:  Negative for rash.   Musculoskeletal:  Negative for joint pain and joint swelling.   Gastrointestinal:  Negative for abdominal pain, diarrhea, nausea and vomiting.   Neurological:  Negative for excessive daytime sleepiness, dizziness, focal  weakness, light-headedness, numbness and weakness.   Psychiatric/Behavioral:  Negative for depression. The patient does not have insomnia.            Current Outpatient Medications:     albuterol sulfate  (90 Base) MCG/ACT inhaler, Inhale 2 puffs 4 (Four) Times a Day As Needed for Wheezing or Shortness of Air., Disp: , Rfl:     ALPRAZolam (XANAX) 0.25 MG tablet, Take 1 tablet by mouth Every Night., Disp: , Rfl:     aspirin (aspirin) 81 MG EC tablet, Take 1 tablet by mouth Daily., Disp: , Rfl:     atorvastatin (LIPITOR) 10 MG tablet, Take 1 tablet by mouth Daily., Disp: 90 tablet, Rfl: 3    azelastine (ASTELIN) 0.1 % nasal spray, Administer 2 sprays into the nostril(s) as directed by provider As Needed for Rhinitis. Use in each nostril as directed, Disp: , Rfl:     calcitriol (ROCALTROL) 0.5 MCG capsule, Take 1 capsule by mouth Daily., Disp: 90 capsule, Rfl: 2    cloNIDine (CATAPRES) 0.3 MG tablet, Take 1 tablet by mouth 3 (Three) Times a Day., Disp: 270 tablet, Rfl: 2    cloNIDine (CATAPRES-TTS) 0.2 MG/24HR patch, Place 1 patch on the skin as directed by provider 1 (One) Time Per Week. THURSDAYS, Disp: , Rfl:     clopidogrel (PLAVIX) 75 MG tablet, Take 1 tablet by mouth Daily., Disp: , Rfl:     Copper Gluconate (Copper Caps) 2 MG capsule, Take 2 mg by mouth Daily., Disp: , Rfl:     desoximetasone (TOPICORT) 0.25 % cream, Apply 1 Application topically to the appropriate area as directed 2 (Two) Times a Day As Needed for Irritation., Disp: 15 g, Rfl: 0    docusate sodium (COLACE) 100 MG capsule, Take 1 capsule by mouth 3 (Three) Times a Day As Needed for Constipation., Disp: , Rfl:     fexofenadine (ALLEGRA) 180 MG tablet, Take 1 tablet by mouth Daily., Disp: , Rfl:     furosemide (Lasix) 20 MG tablet, Take 0.5 tablets by mouth Daily., Disp: , Rfl:     furosemide (LASIX) 40 MG tablet, Take 1 tablet by mouth Daily., Disp: 90 tablet, Rfl: 2    hydrALAZINE (APRESOLINE) 100 MG tablet, Take 1 tablet by mouth 3  (Three) Times a Day. 100mg daily, Disp: , Rfl:     isosorbide dinitrate (ISORDIL) 10 MG tablet, Take 1 tablet by mouth 3 (Three) Times a Day., Disp: 270 tablet, Rfl: 2    levothyroxine (Synthroid) 88 MCG tablet, Take 1 tablet by mouth Daily., Disp: 90 tablet, Rfl: 2    magnesium chloride ER 64 MG DR tablet, Take  by mouth Daily., Disp: , Rfl:     montelukast (SINGULAIR) 10 MG tablet, Take 1 tablet by mouth Every Night., Disp: , Rfl:     Multiple Vitamins-Minerals (PRESERVISION/LUTEIN) capsule, Take 1 capsule by mouth 2 (two) times a day., Disp: , Rfl:     NIFEdipine CC (ADALAT CC) 60 MG 24 hr tablet, Take 2 tablets by mouth Daily for 270 days., Disp: , Rfl:     NON FORMULARY, Take 25 mg by mouth At Night As Needed. Zzzquill, Disp: , Rfl:     omeprazole (priLOSEC) 20 MG capsule, Take 1 capsule by mouth Daily., Disp: , Rfl:     oxymetazoline (AFRIN) 0.05 % nasal spray, Administer 2 sprays into the nostril(s) as directed by provider As Needed for Congestion., Disp: , Rfl:     sodium bicarbonate 650 MG tablet, Take 1 tablet by mouth 4 (Four) Times a Day., Disp: , Rfl:     spironolactone (Aldactone) 25 MG tablet, Take 2 tablets by mouth Daily., Disp: , Rfl:     valsartan (DIOVAN) 320 MG tablet, Take 1 tablet by mouth Daily., Disp: , Rfl:     vitamin D (ERGOCALCIFEROL) 1.25 MG (67191 UT) capsule capsule, Take 1 capsule by mouth 1 (One) Time Per Week., Disp: , Rfl:     cholestyramine light 4 g packet, Take 1 packet by mouth Daily., Disp: 90 packet, Rfl: 1    fluticasone (FLONASE) 50 MCG/ACT nasal spray, USE 2 SPRAYS IN EACH NOSTRIL AS DIRECTED BY PROVIDER DAILY AS NEEDED FOR RHINITIS, Disp: 48 g, Rfl: 3    mesalamine (APRISO) 0.375 g 24 hr capsule, Take 4 capsules by mouth Daily., Disp: 360 capsule, Rfl: 1    tamsulosin (FLOMAX) 0.4 MG capsule 24 hr capsule, Take 2 capsules by mouth Every Night., Disp: , Rfl:     Current Facility-Administered Medications:     cyanocobalamin injection 1,000 mcg, 1,000 mcg, Intramuscular,  Q28 Days, Ruthie Parra, STERLING, 1,000 mcg at 08/11/23 1123       Objective:      Vitals:    02/27/25 1021   BP: 162/60   Pulse: 66   SpO2: 98%     Vitals and nursing note reviewed.   Constitutional:       Appearance: Normal and healthy appearance. Well-developed and not in distress.   Eyes:      Extraocular Movements: Extraocular movements intact.      Pupils: Pupils are equal, round, and reactive to light.   HENT:      Head: Normocephalic and atraumatic.    Mouth/Throat:      Pharynx: Oropharynx is clear.   Neck:      Vascular: JVD normal.      Trachea: Trachea normal.   Pulmonary:      Effort: Pulmonary effort is normal.      Breath sounds: Normal breath sounds. No wheezing. No rhonchi. No rales.   Cardiovascular:      PMI at left midclavicular line. Normal rate. Regular rhythm. Normal S1. Normal S2.       Murmurs: There is a grade 2/6 systolic murmur.      No gallop.  No click. No rub.   Pulses:     Dorsalis pedis: 2+ bilaterally.     Posterior tibial: 2+ bilaterally.  Abdominal:      General: Bowel sounds are normal.      Palpations: Abdomen is soft.      Tenderness: There is no abdominal tenderness.   Musculoskeletal: Normal range of motion.      Cervical back: Normal range of motion and neck supple. Skin:     General: Skin is warm and dry.      Capillary Refill: Capillary refill takes less than 2 seconds.   Feet:      Right foot:      Skin integrity: Skin integrity normal.      Left foot:      Skin integrity: Skin integrity normal.   Neurological:      Mental Status: Alert and oriented to person, place and time.      Sensory: Sensation is intact.      Motor: Motor function is intact.      Coordination: Coordination is intact.   Psychiatric:         Speech: Speech normal.         Behavior: Behavior is cooperative.       Physical Exam  Vital Signs  Blood pressure was 162 and a few days ago it was 198.    Lab Review:       Procedures  Results      Assessment/Plan:     Problem List Items Addressed This Visit        Essential hypertension    Relevant Medications    spironolactone (Aldactone) 25 MG tablet    Resistant hypertension    Relevant Medications    spironolactone (Aldactone) 25 MG tablet    PAD (peripheral artery disease)    IHD (ischemic heart disease)    CKD (chronic kidney disease) stage 4, GFR 15-29 ml/min    Relevant Medications    spironolactone (Aldactone) 25 MG tablet    Mixed hyperlipidemia    Systolic murmur (Chronic)    Chronic diastolic (congestive) heart failure - Primary     Assessment & Plan  1. Hypertension.  His blood pressure remains uncontrolled despite being on multiple medications, including clonidine 4 times a day, Lasix daily, hydralazine 3 times a day, isosorbide 3 times a day, and nifedipine 2 tablets at night. He recently saw Dr. Zimmer, who adjusted his medication to include spironolactone 25 mg. He has discontinued Jardiance due to shortness of breath. A new procedure involving a catheter inserted into the kidneys to deliver vibrations is being considered once approved at the hospital. In the meantime, he is advised to increase his spironolactone dosage to 50 mg daily.      Recommendations/plans:    Transcribed from ambient dictation for Rip Mcguire DO by Rip Mcguire DO.  03/16/25   16:24 CDT    Patient or patient representative verbalized consent for the use of Ambient Listening during the visit with  Rip Mcguire DO for chart documentation. 3/16/2025  16:24 CDT

## 2025-03-17 ENCOUNTER — INFUSION (OUTPATIENT)
Dept: ONCOLOGY | Facility: HOSPITAL | Age: 75
End: 2025-03-17
Payer: MEDICARE

## 2025-03-17 ENCOUNTER — LAB (OUTPATIENT)
Dept: LAB | Facility: HOSPITAL | Age: 75
End: 2025-03-17
Payer: MEDICARE

## 2025-03-17 VITALS
HEART RATE: 55 BPM | SYSTOLIC BLOOD PRESSURE: 165 MMHG | BODY MASS INDEX: 19.91 KG/M2 | RESPIRATION RATE: 16 BRPM | TEMPERATURE: 97.7 F | WEIGHT: 147 LBS | OXYGEN SATURATION: 95 % | HEIGHT: 72 IN | DIASTOLIC BLOOD PRESSURE: 85 MMHG

## 2025-03-17 DIAGNOSIS — D50.9 IRON DEFICIENCY ANEMIA, UNSPECIFIED IRON DEFICIENCY ANEMIA TYPE: ICD-10-CM

## 2025-03-17 DIAGNOSIS — D63.1 ANEMIA DUE TO STAGE 4 CHRONIC KIDNEY DISEASE: Primary | ICD-10-CM

## 2025-03-17 DIAGNOSIS — J98.4 RESTRICTIVE AIRWAY DISEASE: ICD-10-CM

## 2025-03-17 DIAGNOSIS — N18.4 ANEMIA DUE TO STAGE 4 CHRONIC KIDNEY DISEASE: Primary | ICD-10-CM

## 2025-03-17 LAB
ALBUMIN SERPL-MCNC: 3.5 G/DL (ref 3.5–5.2)
ALBUMIN/GLOB SERPL: 1.1 G/DL
ALP SERPL-CCNC: 170 U/L (ref 39–117)
ALT SERPL W P-5'-P-CCNC: 11 U/L (ref 1–41)
ANION GAP SERPL CALCULATED.3IONS-SCNC: 19 MMOL/L (ref 5–15)
AST SERPL-CCNC: 15 U/L (ref 1–40)
BASOPHILS # BLD AUTO: 0.05 10*3/MM3 (ref 0–0.2)
BASOPHILS NFR BLD AUTO: 0.8 % (ref 0–1.5)
BILIRUB SERPL-MCNC: 0.3 MG/DL (ref 0–1.2)
BUN SERPL-MCNC: 80 MG/DL (ref 8–23)
BUN/CREAT SERPL: 15.9 (ref 7–25)
CALCIUM SPEC-SCNC: 8.8 MG/DL (ref 8.6–10.5)
CHLORIDE SERPL-SCNC: 101 MMOL/L (ref 98–107)
CO2 SERPL-SCNC: 18 MMOL/L (ref 22–29)
CREAT SERPL-MCNC: 5.04 MG/DL (ref 0.76–1.27)
DEPRECATED RDW RBC AUTO: 63.6 FL (ref 37–54)
EGFRCR SERPLBLD CKD-EPI 2021: 11.1 ML/MIN/1.73
EOSINOPHIL # BLD AUTO: 0.31 10*3/MM3 (ref 0–0.4)
EOSINOPHIL NFR BLD AUTO: 5 % (ref 0.3–6.2)
ERYTHROCYTE [DISTWIDTH] IN BLOOD BY AUTOMATED COUNT: 16.4 % (ref 12.3–15.4)
FERRITIN SERPL-MCNC: 131.8 NG/ML (ref 30–400)
GLOBULIN UR ELPH-MCNC: 3.1 GM/DL
GLUCOSE SERPL-MCNC: 168 MG/DL (ref 65–99)
HCT VFR BLD AUTO: 27.6 % (ref 37.5–51)
HGB BLD-MCNC: 8.4 G/DL (ref 13–17.7)
IMM GRANULOCYTES # BLD AUTO: 0.03 10*3/MM3 (ref 0–0.05)
IMM GRANULOCYTES NFR BLD AUTO: 0.5 % (ref 0–0.5)
IRON 24H UR-MRATE: 44 MCG/DL (ref 59–158)
IRON SATN MFR SERPL: 14 % (ref 20–50)
LYMPHOCYTES # BLD AUTO: 0.91 10*3/MM3 (ref 0.7–3.1)
LYMPHOCYTES NFR BLD AUTO: 14.5 % (ref 19.6–45.3)
MCH RBC QN AUTO: 32.4 PG (ref 26.6–33)
MCHC RBC AUTO-ENTMCNC: 30.4 G/DL (ref 31.5–35.7)
MCV RBC AUTO: 106.6 FL (ref 79–97)
MONOCYTES # BLD AUTO: 0.75 10*3/MM3 (ref 0.1–0.9)
MONOCYTES NFR BLD AUTO: 12 % (ref 5–12)
NEUTROPHILS NFR BLD AUTO: 4.21 10*3/MM3 (ref 1.7–7)
NEUTROPHILS NFR BLD AUTO: 67.2 % (ref 42.7–76)
PLATELET # BLD AUTO: 149 10*3/MM3 (ref 140–450)
PMV BLD AUTO: 10 FL (ref 6–12)
POTASSIUM SERPL-SCNC: 4.3 MMOL/L (ref 3.5–5.2)
PROT SERPL-MCNC: 6.6 G/DL (ref 6–8.5)
RBC # BLD AUTO: 2.59 10*6/MM3 (ref 4.14–5.8)
SODIUM SERPL-SCNC: 138 MMOL/L (ref 136–145)
TIBC SERPL-MCNC: 305 MCG/DL (ref 298–536)
TRANSFERRIN SERPL-MCNC: 205 MG/DL (ref 200–360)
WBC NRBC COR # BLD AUTO: 6.26 10*3/MM3 (ref 3.4–10.8)

## 2025-03-17 PROCEDURE — 83540 ASSAY OF IRON: CPT

## 2025-03-17 PROCEDURE — 82728 ASSAY OF FERRITIN: CPT

## 2025-03-17 PROCEDURE — 80053 COMPREHEN METABOLIC PANEL: CPT

## 2025-03-17 PROCEDURE — 96372 THER/PROPH/DIAG INJ SC/IM: CPT

## 2025-03-17 PROCEDURE — 85025 COMPLETE CBC W/AUTO DIFF WBC: CPT

## 2025-03-17 PROCEDURE — 84466 ASSAY OF TRANSFERRIN: CPT

## 2025-03-17 PROCEDURE — 36415 COLL VENOUS BLD VENIPUNCTURE: CPT

## 2025-03-17 PROCEDURE — 25010000002 EPOETIN ALFA-EPBX 40000 UNIT/ML SOLUTION: Performed by: NURSE PRACTITIONER

## 2025-03-17 RX ORDER — BUDESONIDE, GLYCOPYRROLATE, AND FORMOTEROL FUMARATE 160; 9; 4.8 UG/1; UG/1; UG/1
2 AEROSOL, METERED RESPIRATORY (INHALATION) 2 TIMES DAILY
Qty: 32.1 G | Refills: 3 | OUTPATIENT
Start: 2025-03-17

## 2025-03-17 RX ADMIN — EPOETIN ALFA-EPBX 40000 UNITS: 40000 INJECTION, SOLUTION INTRAVENOUS; SUBCUTANEOUS at 08:48

## 2025-03-20 ENCOUNTER — TELEPHONE (OUTPATIENT)
Dept: ONCOLOGY | Facility: CLINIC | Age: 75
End: 2025-03-20

## 2025-03-20 NOTE — TELEPHONE ENCOUNTER
"    Caller: Ricardo Hugo \"Harjinder\"    Relationship to patient: Self    Best call back number: [phone]     Chief complaint: PT WOULD LIKE TO R/S APPT    Type of visit: LAB AND INJECTION    Requested date: 04/02/25 PT WOULD LIKE A MORNING APPT     If rescheduling, when is the original appointment: 03/31/25         "

## 2025-03-21 RX ORDER — TRAMADOL HYDROCHLORIDE 50 MG/1
25-50 TABLET ORAL EVERY 12 HOURS PRN
Qty: 30 TABLET | Refills: 0 | Status: SHIPPED | OUTPATIENT
Start: 2025-03-21

## 2025-03-24 ENCOUNTER — LAB (OUTPATIENT)
Dept: LAB | Facility: HOSPITAL | Age: 75
End: 2025-03-24
Payer: MEDICARE

## 2025-03-24 ENCOUNTER — TRANSCRIBE ORDERS (OUTPATIENT)
Dept: LAB | Facility: HOSPITAL | Age: 75
End: 2025-03-24
Payer: MEDICARE

## 2025-03-24 ENCOUNTER — INFUSION (OUTPATIENT)
Dept: ONCOLOGY | Facility: HOSPITAL | Age: 75
End: 2025-03-24
Payer: MEDICARE

## 2025-03-24 VITALS
TEMPERATURE: 98 F | BODY MASS INDEX: 20.62 KG/M2 | OXYGEN SATURATION: 96 % | HEIGHT: 72 IN | RESPIRATION RATE: 20 BRPM | WEIGHT: 152.2 LBS | SYSTOLIC BLOOD PRESSURE: 155 MMHG | HEART RATE: 53 BPM | DIASTOLIC BLOOD PRESSURE: 37 MMHG

## 2025-03-24 DIAGNOSIS — N18.4 CHRONIC KIDNEY DISEASE, STAGE IV (SEVERE): ICD-10-CM

## 2025-03-24 DIAGNOSIS — E78.2 MIXED HYPERLIPIDEMIA: ICD-10-CM

## 2025-03-24 DIAGNOSIS — N18.4 CHRONIC KIDNEY DISEASE, STAGE IV (SEVERE): Primary | ICD-10-CM

## 2025-03-24 DIAGNOSIS — I10 ESSENTIAL HYPERTENSION, MALIGNANT: ICD-10-CM

## 2025-03-24 DIAGNOSIS — N40.1 BENIGN PROSTATIC HYPERPLASIA WITH INCOMPLETE BLADDER EMPTYING: ICD-10-CM

## 2025-03-24 DIAGNOSIS — I1A.0 RESISTANT HYPERTENSION: ICD-10-CM

## 2025-03-24 DIAGNOSIS — R39.14 BENIGN PROSTATIC HYPERPLASIA WITH INCOMPLETE BLADDER EMPTYING: ICD-10-CM

## 2025-03-24 DIAGNOSIS — N18.4 ANEMIA DUE TO STAGE 4 CHRONIC KIDNEY DISEASE: Primary | ICD-10-CM

## 2025-03-24 DIAGNOSIS — N18.4 ANEMIA DUE TO STAGE 4 CHRONIC KIDNEY DISEASE: ICD-10-CM

## 2025-03-24 DIAGNOSIS — D63.1 ANEMIA DUE TO STAGE 4 CHRONIC KIDNEY DISEASE: Primary | ICD-10-CM

## 2025-03-24 DIAGNOSIS — E55.9 VITAMIN D DEFICIENCY: ICD-10-CM

## 2025-03-24 DIAGNOSIS — Z79.899 ENCOUNTER FOR LONG-TERM CURRENT USE OF MEDICATION: ICD-10-CM

## 2025-03-24 DIAGNOSIS — D63.1 ANEMIA DUE TO STAGE 4 CHRONIC KIDNEY DISEASE: ICD-10-CM

## 2025-03-24 LAB
25(OH)D3 SERPL-MCNC: 55.5 NG/ML (ref 30–100)
ALBUMIN SERPL-MCNC: 3.4 G/DL (ref 3.5–5.2)
ALBUMIN UR-MCNC: 40.1 MG/DL
ALBUMIN/GLOB SERPL: 1.1 G/DL
ALP SERPL-CCNC: 152 U/L (ref 39–117)
ALT SERPL W P-5'-P-CCNC: 9 U/L (ref 1–41)
ANION GAP SERPL CALCULATED.3IONS-SCNC: 18 MMOL/L (ref 5–15)
AST SERPL-CCNC: 12 U/L (ref 1–40)
BACTERIA UR QL AUTO: NORMAL /HPF
BASOPHILS # BLD AUTO: 0.06 10*3/MM3 (ref 0–0.2)
BASOPHILS NFR BLD AUTO: 0.9 % (ref 0–1.5)
BILIRUB SERPL-MCNC: 0.3 MG/DL (ref 0–1.2)
BILIRUB UR QL STRIP: NEGATIVE
BUN SERPL-MCNC: 75 MG/DL (ref 8–23)
BUN/CREAT SERPL: 15.5 (ref 7–25)
CALCIUM SPEC-SCNC: 8.6 MG/DL (ref 8.6–10.5)
CHLORIDE SERPL-SCNC: 103 MMOL/L (ref 98–107)
CHOLEST SERPL-MCNC: 80 MG/DL (ref 0–200)
CLARITY UR: CLEAR
CO2 SERPL-SCNC: 18 MMOL/L (ref 22–29)
COLOR UR: YELLOW
CREAT SERPL-MCNC: 4.83 MG/DL (ref 0.76–1.27)
CREAT UR-MCNC: 52.4 MG/DL
DEPRECATED RDW RBC AUTO: 61.2 FL (ref 37–54)
EGFRCR SERPLBLD CKD-EPI 2021: 11.7 ML/MIN/1.73
EOSINOPHIL # BLD AUTO: 0.44 10*3/MM3 (ref 0–0.4)
EOSINOPHIL NFR BLD AUTO: 6.4 % (ref 0.3–6.2)
ERYTHROCYTE [DISTWIDTH] IN BLOOD BY AUTOMATED COUNT: 16 % (ref 12.3–15.4)
GLOBULIN UR ELPH-MCNC: 3 GM/DL
GLUCOSE SERPL-MCNC: 122 MG/DL (ref 65–99)
GLUCOSE UR STRIP-MCNC: NEGATIVE MG/DL
HCT VFR BLD AUTO: 27.1 % (ref 37.5–51)
HDLC SERPL-MCNC: 32 MG/DL (ref 40–60)
HGB BLD-MCNC: 8.2 G/DL (ref 13–17.7)
HGB UR QL STRIP.AUTO: NEGATIVE
HYALINE CASTS UR QL AUTO: NORMAL /LPF
IMM GRANULOCYTES # BLD AUTO: 0.05 10*3/MM3 (ref 0–0.05)
IMM GRANULOCYTES NFR BLD AUTO: 0.7 % (ref 0–0.5)
KETONES UR QL STRIP: NEGATIVE
LDLC SERPL CALC-MCNC: 35 MG/DL (ref 0–100)
LDLC/HDLC SERPL: 1.18 {RATIO}
LEUKOCYTE ESTERASE UR QL STRIP.AUTO: NEGATIVE
LYMPHOCYTES # BLD AUTO: 0.88 10*3/MM3 (ref 0.7–3.1)
LYMPHOCYTES NFR BLD AUTO: 12.8 % (ref 19.6–45.3)
MAGNESIUM SERPL-MCNC: 2.1 MG/DL (ref 1.6–2.4)
MCH RBC QN AUTO: 32 PG (ref 26.6–33)
MCHC RBC AUTO-ENTMCNC: 30.3 G/DL (ref 31.5–35.7)
MCV RBC AUTO: 105.9 FL (ref 79–97)
MONOCYTES # BLD AUTO: 0.73 10*3/MM3 (ref 0.1–0.9)
MONOCYTES NFR BLD AUTO: 10.6 % (ref 5–12)
NEUTROPHILS NFR BLD AUTO: 4.73 10*3/MM3 (ref 1.7–7)
NEUTROPHILS NFR BLD AUTO: 68.6 % (ref 42.7–76)
NITRITE UR QL STRIP: NEGATIVE
PH UR STRIP.AUTO: 5.5 [PH] (ref 5–8)
PHOSPHATE SERPL-MCNC: 8.5 MG/DL (ref 2.5–4.5)
PLATELET # BLD AUTO: 140 10*3/MM3 (ref 140–450)
PMV BLD AUTO: 9.6 FL (ref 6–12)
POTASSIUM SERPL-SCNC: 4.2 MMOL/L (ref 3.5–5.2)
PROT ?TM UR-MCNC: 79.4 MG/DL
PROT SERPL-MCNC: 6.4 G/DL (ref 6–8.5)
PROT UR QL STRIP: ABNORMAL
PROT/CREAT UR: 1515.3 MG/G CREA (ref 0–200)
RBC # BLD AUTO: 2.56 10*6/MM3 (ref 4.14–5.8)
RBC # UR STRIP: NORMAL /HPF
REF LAB TEST METHOD: NORMAL
SODIUM SERPL-SCNC: 139 MMOL/L (ref 136–145)
SP GR UR STRIP: 1.01 (ref 1–1.03)
SQUAMOUS #/AREA URNS HPF: NORMAL /HPF
T4 FREE SERPL-MCNC: 1.04 NG/DL (ref 0.93–1.7)
TRIGL SERPL-MCNC: 51 MG/DL (ref 0–150)
TSH SERPL DL<=0.05 MIU/L-ACNC: 9.69 UIU/ML (ref 0.27–4.2)
URATE SERPL-MCNC: 8.9 MG/DL (ref 3.4–7)
UROBILINOGEN UR QL STRIP: ABNORMAL
VLDLC SERPL-MCNC: 13 MG/DL (ref 5–40)
WBC # UR STRIP: NORMAL /HPF
WBC NRBC COR # BLD AUTO: 6.89 10*3/MM3 (ref 3.4–10.8)

## 2025-03-24 PROCEDURE — 80053 COMPREHEN METABOLIC PANEL: CPT

## 2025-03-24 PROCEDURE — 96372 THER/PROPH/DIAG INJ SC/IM: CPT

## 2025-03-24 PROCEDURE — 36415 COLL VENOUS BLD VENIPUNCTURE: CPT

## 2025-03-24 PROCEDURE — 85025 COMPLETE CBC W/AUTO DIFF WBC: CPT

## 2025-03-24 PROCEDURE — 83735 ASSAY OF MAGNESIUM: CPT

## 2025-03-24 PROCEDURE — 84439 ASSAY OF FREE THYROXINE: CPT

## 2025-03-24 PROCEDURE — 82570 ASSAY OF URINE CREATININE: CPT

## 2025-03-24 PROCEDURE — 84550 ASSAY OF BLOOD/URIC ACID: CPT

## 2025-03-24 PROCEDURE — 81001 URINALYSIS AUTO W/SCOPE: CPT

## 2025-03-24 PROCEDURE — 25010000002 EPOETIN ALFA-EPBX 40000 UNIT/ML SOLUTION: Performed by: NURSE PRACTITIONER

## 2025-03-24 PROCEDURE — 82043 UR ALBUMIN QUANTITATIVE: CPT

## 2025-03-24 PROCEDURE — 80061 LIPID PANEL: CPT

## 2025-03-24 PROCEDURE — 84443 ASSAY THYROID STIM HORMONE: CPT

## 2025-03-24 PROCEDURE — 84156 ASSAY OF PROTEIN URINE: CPT

## 2025-03-24 PROCEDURE — 84100 ASSAY OF PHOSPHORUS: CPT

## 2025-03-24 PROCEDURE — 82306 VITAMIN D 25 HYDROXY: CPT

## 2025-03-24 RX ORDER — FAMOTIDINE 10 MG/ML
20 INJECTION, SOLUTION INTRAVENOUS AS NEEDED
OUTPATIENT
Start: 2025-04-04

## 2025-03-24 RX ORDER — DIPHENHYDRAMINE HYDROCHLORIDE 50 MG/ML
50 INJECTION, SOLUTION INTRAMUSCULAR; INTRAVENOUS AS NEEDED
OUTPATIENT
Start: 2025-04-04

## 2025-03-24 RX ORDER — SODIUM CHLORIDE 9 MG/ML
250 INJECTION, SOLUTION INTRAVENOUS ONCE
OUTPATIENT
Start: 2025-04-04

## 2025-03-24 RX ADMIN — EPOETIN ALFA-EPBX 40000 UNITS: 40000 INJECTION, SOLUTION INTRAVENOUS; SUBCUTANEOUS at 09:46

## 2025-03-28 ENCOUNTER — TELEPHONE (OUTPATIENT)
Age: 75
End: 2025-03-28
Payer: MEDICARE

## 2025-03-28 DIAGNOSIS — M79.671 RIGHT FOOT PAIN: Primary | ICD-10-CM

## 2025-03-28 DIAGNOSIS — N18.4 CKD (CHRONIC KIDNEY DISEASE) STAGE 4, GFR 15-29 ML/MIN: Primary | ICD-10-CM

## 2025-03-28 RX ORDER — ALBUTEROL SULFATE 90 UG/1
INHALANT RESPIRATORY (INHALATION)
Qty: 34 G | Refills: 3 | Status: SHIPPED | OUTPATIENT
Start: 2025-03-28

## 2025-03-28 NOTE — TELEPHONE ENCOUNTER
Patient informed of X-ray order by Dr. Parker at [facility]. Patient to arrive 30 minutes before appointment at [address] outpatient lab and imaging.

## 2025-04-02 ENCOUNTER — PATIENT MESSAGE (OUTPATIENT)
Dept: INTERNAL MEDICINE | Facility: CLINIC | Age: 75
End: 2025-04-02
Payer: MEDICARE

## 2025-04-02 ENCOUNTER — HOSPITAL ENCOUNTER (OUTPATIENT)
Dept: GENERAL RADIOLOGY | Facility: HOSPITAL | Age: 75
Discharge: HOME | End: 2025-04-02
Admitting: STUDENT IN AN ORGANIZED HEALTH CARE EDUCATION/TRAINING PROGRAM
Payer: MEDICARE

## 2025-04-02 ENCOUNTER — OFFICE VISIT (OUTPATIENT)
Age: 75
End: 2025-04-02
Payer: MEDICARE

## 2025-04-02 VITALS — HEIGHT: 72 IN | WEIGHT: 152 LBS | BODY MASS INDEX: 20.59 KG/M2

## 2025-04-02 DIAGNOSIS — I73.9 ARTERIAL INSUFFICIENCY OF LOWER EXTREMITY: ICD-10-CM

## 2025-04-02 DIAGNOSIS — R60.0 EDEMA OF RIGHT FOOT: ICD-10-CM

## 2025-04-02 DIAGNOSIS — M79.671 RIGHT FOOT PAIN: ICD-10-CM

## 2025-04-02 DIAGNOSIS — M79.671 RIGHT FOOT PAIN: Primary | ICD-10-CM

## 2025-04-02 DIAGNOSIS — I73.9 PAD (PERIPHERAL ARTERY DISEASE): ICD-10-CM

## 2025-04-02 DIAGNOSIS — I50.32 CHRONIC DIASTOLIC (CONGESTIVE) HEART FAILURE: ICD-10-CM

## 2025-04-02 DIAGNOSIS — N18.4 CKD (CHRONIC KIDNEY DISEASE) STAGE 4, GFR 15-29 ML/MIN: ICD-10-CM

## 2025-04-02 DIAGNOSIS — R60.9 PITTING EDEMA: ICD-10-CM

## 2025-04-02 PROCEDURE — 73630 X-RAY EXAM OF FOOT: CPT

## 2025-04-02 RX ORDER — CARVEDILOL 25 MG/1
TABLET ORAL EVERY 12 HOURS SCHEDULED
COMMUNITY

## 2025-04-02 NOTE — PROGRESS NOTES
[facility]  Casper Parker MD, PhD  Isaías Parker PA-C    CHIEF COMPLAINT  Initial Evaluation of the Right Foot (Patient presents today for right foot pain. X-rays performed at [facility] on 04/02/2025. Patient complains of numbness and pain from toes to achilles and up the calf. Patient has worsen swelling in the past 2 weeks. )       HISTORY OF PRESENT ILLNESS    History of Present Illness  The patient is a 75-year-old male, new patient, who presents for right foot pain. He is accompanied by his neighbor.    He reports experiencing edema in his right foot, which is associated with pain during ambulation. The onset of the pain is characterized by a tingling sensation in the toes, which subsequently radiates upwards to the calf. The affected area is both tender and cold to touch. He also experiences mild pain in the upper calf region. He has no history of thromboembolic events and does not recall any recent trauma or falls. He has been managing the pain with analgesics. He has no prior history of similar swelling episodes.  He is having a difficult time ambulating and putting pressure on his foot, but normally does not use any assistive devices to ambulate.    He has stage 4 kidney disease, which is managed with medications. He is not on dialysis. He takes Lasix 40 mg in the morning and 20 mg around 4:00 in the afternoon.       HISTORY    Current Outpatient Medications   Medication Instructions    albuterol sulfate  (90 Base) MCG/ACT inhaler USE 2 INHALATIONS FOUR TIMES A DAY AS NEEDED FOR SHORTNESS OF AIR OR WHEEZING    ALPRAZolam (XANAX) 0.25 mg, Nightly    aspirin 81 mg, Daily    atorvastatin (LIPITOR) 10 mg, Oral, Daily    azelastine (ASTELIN) 0.1 % nasal spray 2 sprays, As Needed    calcitriol (ROCALTROL) 0.5 mcg, Oral, Daily    carvedilol (COREG) 25 MG tablet Every 12 Hours Scheduled    cholestyramine light 4 g, Oral, Daily    cloNIDine (CATAPRES) 0.3 mg, Oral, 3  Times Daily    cloNIDine (CATAPRES-TTS) 0.2 MG/24HR patch 1 patch, Weekly    clopidogrel (PLAVIX) 75 mg, Daily    Copper Gluconate (Copper Caps) 2 MG capsule 1 capsule, Daily    desoximetasone (TOPICORT) 0.25 % cream 1 Application, Topical, 2 Times Daily PRN    docusate sodium (COLACE) 100 mg, 3 Times Daily PRN    fexofenadine (ALLEGRA) 180 mg, Daily    fluticasone (FLONASE) 50 MCG/ACT nasal spray USE 2 SPRAYS IN EACH NOSTRIL AS DIRECTED BY PROVIDER DAILY AS NEEDED FOR RHINITIS    furosemide (LASIX) 40 mg, Oral, Daily    furosemide (LASIX) 10 mg, Oral, Daily    hydrALAZINE (APRESOLINE) 100 mg, Oral, 3 Times Daily, 100mg daily    isosorbide dinitrate (ISORDIL) 10 mg, Oral, 3 Times Daily (Nitrates)    levothyroxine (SYNTHROID) 100 mcg, Oral, Every Early Morning    magnesium chloride ER 64 MG DR tablet Daily    mesalamine (APRISO) 1.5 g, Oral, Daily    montelukast (SINGULAIR) 10 mg, Nightly    Multiple Vitamins-Minerals (PRESERVISION/LUTEIN) capsule 1 capsule, 2 times daily    NIFEdipine CC (ADALAT CC) 120 mg, Oral, Daily    NON FORMULARY 25 mg, Nightly PRN    omeprazole (PRILOSEC) 20 mg, Daily    oxymetazoline (AFRIN) 0.05 % nasal spray 2 sprays, As Needed    sodium bicarbonate 650 mg, 4 Times Daily    spironolactone (ALDACTONE) 50 mg, Oral, Daily    tamsulosin (FLOMAX) 0.8 mg, Oral, Nightly    traMADol (ULTRAM) 25-50 mg, Oral, Every 12 Hours PRN    valsartan (DIOVAN) 320 mg, Oral, Daily    vitamin D (ERGOCALCIFEROL) 50,000 Units, Weekly         reports that he quit smoking about 11 years ago. His smoking use included cigarettes. He started smoking about 37 years ago. He has a 12.9 pack-year smoking history. He has been exposed to tobacco smoke. He has never used smokeless tobacco. He reports that he does not currently use alcohol. He reports that he does not use drugs.    Past Medical History:   Diagnosis Date    3-vessel CAD 8/11/2020    Allergic rhinitis     Anxiety disorder 4/27/2020    Arthritis     Asymmetrical  sensorineural hearing loss 6/28/2017    Atherosclerosis of native artery of both lower extremities with intermittent claudication 7/18/2019    Avascular necrosis of femoral head, left 07/11/2020    right hip after surgery    Carotid stenosis     Chronic mucoid otitis media     Chronic rhinitis     Coronary artery disease     HEART BYPASS 2004    Crohn's disease of large intestine with other complication 7/30/2020    Chronic diarrhea Colonoscopy July 2020 revealed mild patchy scattered hemosiderin staining with inflammation more so in rectosigmoid area.  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    Iron deficiency anemia 7/14/2020    Mixed hearing loss of left ear     PAD (peripheral artery disease) 8/11/2020    Perianal abscess     Pernicious anemia 08/17/2020    took shots but never diagnosed with b12 deficiency    Personal history of alcoholism 08/11/2020    quit drinking in 2013    Prostatic hypertrophy 8/11/2020    Sensorineural hearing loss     Sepsis with acute renal failure 9/15/2020    Shortness of breath 5/27/2021    Tinnitus     Ventricular tachycardia, nonsustained 7/14/2020    Weight loss 7/11/2020        Past Surgical History:   Procedure Laterality Date    ARTERY SURGERY  2021    right side on neck    CAROTID ENDARTERECTOMY Right 05/10/2021    Procedure: RIGHT CAROTID ENDARTERECTOMY WITH EEG;  Surgeon: Gil Pineda DO;  Location: [facility] HYBRID OR 12;  Service: Vascular;  Laterality: Right;    COLONOSCOPY N/A 07/02/2020    Procedure: COLONOSCOPY WITH ANESTHESIA;  Surgeon: Adrien Brewster MD;  Location: [facility] ENDOSCOPY;  Service: Gastroenterology;  Laterality:  N/A;  pre op: diarrhea  post op: polyps  PCP: Joe Velasco MD    COLONOSCOPY N/A 10/13/2020    Procedure: COLONOSCOPY WITH ANESTHESIA;  Surgeon: Adrien Brewster MD;  Location: [facility] ENDOSCOPY;  Service: Gastroenterology;  Laterality: N/A;  Pre: Chronic Diarrhea, Crohn's  Post: AVM  Dr. Neftali Velasco  CO2 Inflation Used    COLONOSCOPY N/A 12/08/2023    Procedure: COLONOSCOPY WITH ANESTHESIA;  Surgeon: Adrien Brewster MD;  Location: [facility] ENDOSCOPY;  Service: Gastroenterology;  Laterality: N/A;  pre chrone's disease  post sub optimal prep, polyp, chrone's      CORONARY ARTERY BYPASS GRAFT  2003    x3    ENDOSCOPY N/A 11/02/2021    Procedure: ESOPHAGOGASTRODUODENOSCOPY WITH ANESTHESIA;  Surgeon: Bridger Bell MD;  Location: [facility] ENDOSCOPY;  Service: Gastroenterology;  Laterality: N/A;  pre anemia;gi bleed  post  gi bleed;schatski ring  Dr. ERIC Velasco    ENDOSCOPY N/A 10/10/2023    Procedure: ESOPHAGOGASTRODUODENOSCOPY WITH ANESTHESIA;  Surgeon: Adrien Brewster MD;  Location: [facility] ENDOSCOPY;  Service: Gastroenterology;  Laterality: N/A;  preop; anemia  postop esophagitis ; R/O barretts   PCP Randall Beata    EYE SURGERY Bilateral     catorac    INCISION AND DRAINAGE PERIRECTAL ABSCESS N/A 03/03/2017    Procedure: INCISION AND DRAINAGE OF JEET ANAL ABSCESS;  Surgeon: Lynette Smith MD;  Location: [facility] OR;  Service:     INGUINAL HERNIA REPAIR Bilateral 06/27/2023    Procedure: INGUINAL HERNIA BILATERAL REPAIR LAPAROSCOPIC WITH DAVINCI ROBOT WITH MESH;  Surgeon: Tahira Rivera MD;  Location: [facility] OR;  Service: Robotics - DaVinci;  Laterality: Bilateral;    MYRINGOTOMY W/ TUBES Left 04/17/2017    06/10/2016    TONSILLECTOMY      TOTAL HIP ARTHROPLASTY Right 2006        PHYSICAL EXAM  Constitutional: The patient is in no apparent distress and generally well-appearing. The patient hears me clearly and answers questions appropriately.   Musculoskeletal:  Right foot:  3+ pitting edema  over the dorsum of the foot that extends to the ankle but not into the calf  No significant tenderness with palpation of the calf and posterior lower leg  Tenderness throughout the foot  Intact dorsiflexion and plantarflexion at the ankle but with some increase in pain  No redness or erythema or signs of infection  Difficulty palpating dorsalis pedis pulse  Foot is not particularly cold to the touch and digits appear reasonably well-perfused      IMAGING    XR Chest PA & Lateral  Result Date: 3/13/2025  Narrative: EXAM: XR CHEST PA AND LATERAL-  DATE: 3/13/2025 9:33 AM  HISTORY: shortness of breath; I50.32-Chronic diastolic (congestive) heart failure; R06.02-Shortness of breath   COMPARISON: 1/10/2025.  TECHNIQUE:  Frontal and lateral views of the chest submitted.  FINDINGS:  The patient is status post median sternotomy and CABG. There is calcification of the thoracic aorta. There may be a small amount of left pleural fluid. Moderate right pleural effusion is again noted with associated atelectasis but this has improved. Lungs are hyperexpanded possibly related to emphysema. Sclerosis is noted at the right humeral head may represent avascular necrosis. Pulmonary vascular prominence on the prior exam has resolved.       Impression: 1. Moderate right pleural effusion and possible trace left pleural fluid with associated atelectasis has improved on the right. 2. Improved volume overload.  This report was signed and finalized on 3/13/2025 10:46 AM by Eleuterio Rivera.        Results  Imaging  X-rays right foot taken today personally reviewed and interpreted.   No acute osseous abnormalities.  Bipartite sesamoid.  Mild scattered degenerative changes in the midfoot.       ASSESSMENT & PLAN  Diagnoses and all orders for this visit:    1. Right foot pain (Primary)    2. Pitting edema    3. Edema of right foot    4. Arterial insufficiency of lower extremity    5. PAD (peripheral artery disease)    6. CKD (chronic kidney  disease) stage 4, GFR 15-29 ml/min    7. Chronic diastolic (congestive) heart failure    In the last week or 2 patient developed acute pain in his right foot.  On examination he has significant pitting edema throughout the foot.  His right foot x-rays are reasonably reassuring from an orthopedic perspective.  Cellulitis was considered, but I see no breaks in the skin, there was not significant erythema, and the swelling is much more pitting in nature.  DVT was also considered but the swelling does not extend above the ankle and I think this is much less likely.    In further review of his messages in the chart he has also been having more breathing problems and a recent chest x-ray did show a moderate right pleural effusion.  CT from earlier this year showed a large right pleural effusion, cardiomegaly, a small amount of ascites.  In addition to stage IV kidney disease he also has chronic diastolic congestive heart failure and peripheral artery disease.  I think it is very likely that the edema in his foot is related to these medical issues rather than an acute orthopedic issue. Patient has a modified Wells score 1 just based on the pitting edema, and the swelling is only in his foot and I do not feel like this represents a DVT at this time.  Although he has more chronic issues with shortness of breath he has not acutely worsened in this regard.    I have recommended follow-up with his PCP, Siva trivediap to provide some compression to his foot and once the acute swelling is down he may be able to transition to a compression stocking.  Keeping his foot elevated above heart level was also recommended.  He is already taking furosemide 60 mg daily (40 mg a.m., 20 mg p.m.).      Compression, elevation  Follow-up with PCP  Continue furosemide  Follow up: PRN    Today I spent 45 minutes reviewing prior records and imaging (if available), examining and interviewing patient,  coordinating follow-up care, and documenting in the  medical record.     Patient or patient representative verbalized consent for the use of Ambient Listening during the visit with  Casper Parker MD for chart documentation. 4/2/2025  09:52 CDT    Casper Parker MD, PhD

## 2025-04-04 ENCOUNTER — INFUSION (OUTPATIENT)
Dept: ONCOLOGY | Facility: HOSPITAL | Age: 75
End: 2025-04-04
Payer: MEDICARE

## 2025-04-04 ENCOUNTER — APPOINTMENT (OUTPATIENT)
Dept: LAB | Facility: HOSPITAL | Age: 75
End: 2025-04-04
Payer: MEDICARE

## 2025-04-04 VITALS
RESPIRATION RATE: 18 BRPM | HEART RATE: 55 BPM | TEMPERATURE: 97.7 F | BODY MASS INDEX: 20.61 KG/M2 | DIASTOLIC BLOOD PRESSURE: 48 MMHG | SYSTOLIC BLOOD PRESSURE: 173 MMHG | HEIGHT: 72 IN | OXYGEN SATURATION: 96 %

## 2025-04-04 DIAGNOSIS — N18.4 ANEMIA DUE TO STAGE 4 CHRONIC KIDNEY DISEASE: ICD-10-CM

## 2025-04-04 DIAGNOSIS — N18.4 CKD (CHRONIC KIDNEY DISEASE) STAGE 4, GFR 15-29 ML/MIN: ICD-10-CM

## 2025-04-04 DIAGNOSIS — N18.4 CRD (CHRONIC RENAL DISEASE), STAGE IV: Primary | ICD-10-CM

## 2025-04-04 DIAGNOSIS — D63.1 ANEMIA DUE TO STAGE 4 CHRONIC KIDNEY DISEASE: ICD-10-CM

## 2025-04-04 PROCEDURE — 96365 THER/PROPH/DIAG IV INF INIT: CPT

## 2025-04-04 PROCEDURE — 25010000002 FERRIC CARBOXYMALTOSE 750 MG/15ML SOLUTION 15 ML VIAL: Performed by: NURSE PRACTITIONER

## 2025-04-04 PROCEDURE — 25810000003 SODIUM CHLORIDE 0.9 % SOLUTION: Performed by: NURSE PRACTITIONER

## 2025-04-04 PROCEDURE — 96374 THER/PROPH/DIAG INJ IV PUSH: CPT

## 2025-04-04 RX ORDER — DIPHENHYDRAMINE HYDROCHLORIDE 50 MG/ML
50 INJECTION, SOLUTION INTRAMUSCULAR; INTRAVENOUS AS NEEDED
Status: DISCONTINUED | OUTPATIENT
Start: 2025-04-04 | End: 2025-04-04 | Stop reason: HOSPADM

## 2025-04-04 RX ORDER — SODIUM CHLORIDE 9 MG/ML
250 INJECTION, SOLUTION INTRAVENOUS ONCE
Status: COMPLETED | OUTPATIENT
Start: 2025-04-04 | End: 2025-04-04

## 2025-04-04 RX ORDER — FAMOTIDINE 10 MG/ML
20 INJECTION, SOLUTION INTRAVENOUS AS NEEDED
Status: DISCONTINUED | OUTPATIENT
Start: 2025-04-04 | End: 2025-04-04 | Stop reason: HOSPADM

## 2025-04-04 RX ORDER — HYDROCORTISONE SODIUM SUCCINATE 100 MG/2ML
100 INJECTION INTRAMUSCULAR; INTRAVENOUS AS NEEDED
Status: DISCONTINUED | OUTPATIENT
Start: 2025-04-04 | End: 2025-04-04 | Stop reason: HOSPADM

## 2025-04-04 RX ADMIN — FERRIC CARBOXYMALTOSE INJECTION 750 MG: 50 INJECTION, SOLUTION INTRAVENOUS at 12:26

## 2025-04-04 RX ADMIN — SODIUM CHLORIDE 250 ML: 9 INJECTION, SOLUTION INTRAVENOUS at 12:26

## 2025-04-07 ENCOUNTER — INFUSION (OUTPATIENT)
Dept: ONCOLOGY | Facility: HOSPITAL | Age: 75
End: 2025-04-07
Payer: MEDICARE

## 2025-04-07 ENCOUNTER — LAB (OUTPATIENT)
Dept: LAB | Facility: HOSPITAL | Age: 75
End: 2025-04-07
Payer: MEDICARE

## 2025-04-07 VITALS
RESPIRATION RATE: 18 BRPM | BODY MASS INDEX: 19.5 KG/M2 | TEMPERATURE: 98.6 F | DIASTOLIC BLOOD PRESSURE: 48 MMHG | SYSTOLIC BLOOD PRESSURE: 184 MMHG | HEART RATE: 59 BPM | OXYGEN SATURATION: 90 % | HEIGHT: 72 IN | WEIGHT: 144 LBS

## 2025-04-07 DIAGNOSIS — D63.1 ANEMIA DUE TO STAGE 4 CHRONIC KIDNEY DISEASE: ICD-10-CM

## 2025-04-07 DIAGNOSIS — N18.4 ANEMIA DUE TO STAGE 4 CHRONIC KIDNEY DISEASE: Primary | ICD-10-CM

## 2025-04-07 DIAGNOSIS — N18.4 CKD (CHRONIC KIDNEY DISEASE) STAGE 4, GFR 15-29 ML/MIN: ICD-10-CM

## 2025-04-07 DIAGNOSIS — N18.4 ANEMIA DUE TO STAGE 4 CHRONIC KIDNEY DISEASE: ICD-10-CM

## 2025-04-07 DIAGNOSIS — D63.1 ANEMIA DUE TO STAGE 4 CHRONIC KIDNEY DISEASE: Primary | ICD-10-CM

## 2025-04-07 DIAGNOSIS — E03.9 HYPOTHYROIDISM, UNSPECIFIED TYPE: ICD-10-CM

## 2025-04-07 LAB
ANION GAP SERPL CALCULATED.3IONS-SCNC: 16 MMOL/L (ref 5–15)
BASOPHILS # BLD AUTO: 0.06 10*3/MM3 (ref 0–0.2)
BASOPHILS NFR BLD AUTO: 1.2 % (ref 0–1.5)
BUN SERPL-MCNC: 80 MG/DL (ref 8–23)
BUN/CREAT SERPL: 16.3 (ref 7–25)
CALCIUM SPEC-SCNC: 8.9 MG/DL (ref 8.6–10.5)
CHLORIDE SERPL-SCNC: 103 MMOL/L (ref 98–107)
CO2 SERPL-SCNC: 20 MMOL/L (ref 22–29)
CREAT SERPL-MCNC: 4.9 MG/DL (ref 0.76–1.27)
DEPRECATED RDW RBC AUTO: 61.4 FL (ref 37–54)
EGFRCR SERPLBLD CKD-EPI 2021: 11.5 ML/MIN/1.73
EOSINOPHIL # BLD AUTO: 0.44 10*3/MM3 (ref 0–0.4)
EOSINOPHIL NFR BLD AUTO: 8.8 % (ref 0.3–6.2)
ERYTHROCYTE [DISTWIDTH] IN BLOOD BY AUTOMATED COUNT: 16.2 % (ref 12.3–15.4)
GLUCOSE SERPL-MCNC: 126 MG/DL (ref 65–99)
HCT VFR BLD AUTO: 30.7 % (ref 37.5–51)
HGB BLD-MCNC: 9.2 G/DL (ref 13–17.7)
IMM GRANULOCYTES # BLD AUTO: 0.03 10*3/MM3 (ref 0–0.05)
IMM GRANULOCYTES NFR BLD AUTO: 0.6 % (ref 0–0.5)
LYMPHOCYTES # BLD AUTO: 0.8 10*3/MM3 (ref 0.7–3.1)
LYMPHOCYTES NFR BLD AUTO: 16 % (ref 19.6–45.3)
MCH RBC QN AUTO: 31 PG (ref 26.6–33)
MCHC RBC AUTO-ENTMCNC: 30 G/DL (ref 31.5–35.7)
MCV RBC AUTO: 103.4 FL (ref 79–97)
MONOCYTES # BLD AUTO: 0.52 10*3/MM3 (ref 0.1–0.9)
MONOCYTES NFR BLD AUTO: 10.4 % (ref 5–12)
NEUTROPHILS NFR BLD AUTO: 3.14 10*3/MM3 (ref 1.7–7)
NEUTROPHILS NFR BLD AUTO: 63 % (ref 42.7–76)
NRBC BLD AUTO-RTO: 0 /100 WBC (ref 0–0.2)
PLATELET # BLD AUTO: 118 10*3/MM3 (ref 140–450)
PMV BLD AUTO: 10 FL (ref 6–12)
POTASSIUM SERPL-SCNC: 4.4 MMOL/L (ref 3.5–5.2)
RBC # BLD AUTO: 2.97 10*6/MM3 (ref 4.14–5.8)
SODIUM SERPL-SCNC: 139 MMOL/L (ref 136–145)
WBC NRBC COR # BLD AUTO: 4.99 10*3/MM3 (ref 3.4–10.8)

## 2025-04-07 PROCEDURE — 36415 COLL VENOUS BLD VENIPUNCTURE: CPT

## 2025-04-07 PROCEDURE — 85025 COMPLETE CBC W/AUTO DIFF WBC: CPT

## 2025-04-07 PROCEDURE — 84443 ASSAY THYROID STIM HORMONE: CPT

## 2025-04-07 PROCEDURE — 25010000002 EPOETIN ALFA-EPBX 40000 UNIT/ML SOLUTION: Performed by: NURSE PRACTITIONER

## 2025-04-07 PROCEDURE — 96372 THER/PROPH/DIAG INJ SC/IM: CPT

## 2025-04-07 PROCEDURE — 80048 BASIC METABOLIC PNL TOTAL CA: CPT

## 2025-04-07 RX ORDER — TRAMADOL HYDROCHLORIDE 50 MG/1
25-50 TABLET ORAL EVERY 12 HOURS PRN
Qty: 30 TABLET | Refills: 0 | Status: SHIPPED | OUTPATIENT
Start: 2025-04-07 | End: 2025-04-14

## 2025-04-07 RX ADMIN — EPOETIN ALFA-EPBX 40000 UNITS: 40000 INJECTION, SOLUTION INTRAVENOUS; SUBCUTANEOUS at 14:30

## 2025-04-08 ENCOUNTER — TELEPHONE (OUTPATIENT)
Dept: VASCULAR SURGERY | Facility: CLINIC | Age: 75
End: 2025-04-08
Payer: MEDICARE

## 2025-04-08 DIAGNOSIS — E03.9 HYPOTHYROIDISM, UNSPECIFIED TYPE: Primary | ICD-10-CM

## 2025-04-08 LAB — TSH SERPL DL<=0.05 MIU/L-ACNC: 19.82 UIU/ML (ref 0.27–4.2)

## 2025-04-09 ENCOUNTER — OFFICE VISIT (OUTPATIENT)
Dept: VASCULAR SURGERY | Facility: CLINIC | Age: 75
End: 2025-04-09
Payer: MEDICARE

## 2025-04-09 VITALS
OXYGEN SATURATION: 96 % | HEART RATE: 78 BPM | BODY MASS INDEX: 19.5 KG/M2 | DIASTOLIC BLOOD PRESSURE: 68 MMHG | HEIGHT: 72 IN | WEIGHT: 144 LBS | SYSTOLIC BLOOD PRESSURE: 132 MMHG

## 2025-04-09 DIAGNOSIS — Z01.818 PREOP TESTING: ICD-10-CM

## 2025-04-09 DIAGNOSIS — I10 ESSENTIAL HYPERTENSION: ICD-10-CM

## 2025-04-09 DIAGNOSIS — N18.6 ESRD (END STAGE RENAL DISEASE): ICD-10-CM

## 2025-04-09 DIAGNOSIS — M79.89 ARM SWELLING: ICD-10-CM

## 2025-04-09 DIAGNOSIS — Z51.81 ENCOUNTER FOR MONITORING ANTIPLATELET THERAPY: ICD-10-CM

## 2025-04-09 DIAGNOSIS — I73.9 PAD (PERIPHERAL ARTERY DISEASE): Primary | ICD-10-CM

## 2025-04-09 DIAGNOSIS — E78.2 MIXED HYPERLIPIDEMIA: ICD-10-CM

## 2025-04-09 DIAGNOSIS — Z79.02 ENCOUNTER FOR MONITORING ANTIPLATELET THERAPY: ICD-10-CM

## 2025-04-09 NOTE — PROGRESS NOTES
"4/10/2025       Nathan Zimmer MD  [address] [place] [address]    Ricardo Hugo  1950    Chief Complaint   Patient presents with    right leg pain     Right leg is painful and foot is cold. Is upset and wants testing       Dear Nathan Zimmer MD         I had the pleasure of seeing your patient Ricardo Hugo in the office today.  As you recall, Ricardo Hugo is a 75 y.o.  male who we are following for lower extremity PAD and carotid occlusive disease.  He was found to have significant carotid disease and underwent a right carotid endarterectomy on 5/10/2021.  He is here today with complaints of right leg pain and cold foot.  He does have claudication to his lower extremities.  He follows with nephrology and his most recent GFR was 11.5 2 days ago.  Due to this, we have not recommended any intervention to his lower extremities.  He is maintained on aspirin, Plavix, and Lipitor.      Review of Systems   Constitutional: Negative.  Negative for diaphoresis and fever.   HENT: Negative.     Eyes: Negative.    Respiratory: Negative.  Negative for shortness of breath and wheezing.    Cardiovascular: Negative.  Negative for chest pain and leg swelling.        Claudication to bilateral lower extremities   Gastrointestinal: Negative.  Negative for abdominal pain.   Endocrine: Negative.    Genitourinary: Negative.    Musculoskeletal:  Positive for gait problem.   Skin: Negative.    Allergic/Immunologic: Negative.    Neurological:  Negative for dizziness and weakness.   Hematological: Negative.    Psychiatric/Behavioral: Negative.          /68   Pulse 78   Ht 182.9 cm (72\")   Wt 65.3 kg (144 lb)   SpO2 96%   BMI 19.53 kg/m²        Physical Exam  Vitals and nursing note reviewed.   Constitutional:       General: He is not in acute distress.     Appearance: Normal appearance. He is not diaphoretic.   HENT:      Head: Normocephalic. No right periorbital erythema or left periorbital " erythema.      Nose: Nose normal.   Eyes:      General: No scleral icterus.     Pupils: Pupils are equal.   Cardiovascular:      Rate and Rhythm: Normal rate and regular rhythm.      Pulses:           Dorsalis pedis pulses are 0 on the right side and detected w/ Doppler on the left side.        Posterior tibial pulses are detected w/ Doppler on the right side and detected w/ Doppler on the left side.      Heart sounds: Normal heart sounds. No murmur heard.  Pulmonary:      Effort: Pulmonary effort is normal. No respiratory distress.      Breath sounds: Normal breath sounds.   Abdominal:      General: Bowel sounds are normal. There is no distension.      Palpations: Abdomen is soft.      Tenderness: There is no abdominal tenderness. There is no guarding.   Musculoskeletal:         General: No swelling or tenderness. Normal range of motion.      Cervical back: Normal range of motion and neck supple.      Right lower leg: No edema.      Left lower leg: No edema.   Feet:      Right foot:      Skin integrity: Skin integrity normal.      Left foot:      Skin integrity: Skin integrity normal.   Skin:     General: Skin is warm and dry.      Findings: No erythema or rash.   Neurological:      General: No focal deficit present.      Mental Status: He is alert and oriented to person, place, and time. Mental status is at baseline.      Cranial Nerves: No cranial nerve deficit.      Gait: Gait normal.   Psychiatric:         Attention and Perception: Attention normal.         Mood and Affect: Mood normal.         Behavior: Behavior normal.         Thought Content: Thought content normal.         Judgment: Judgment normal.           Diagnostic data:      Patient Active Problem List   Diagnosis    Chronic rhinitis    Essential hypertension    Resistant hypertension    Chronic diarrhea    Symptomatic anemia    Wellness examination    PAD (peripheral artery disease)    IHD (ischemic heart disease)    Carotid stenosis    Stenosis of  right carotid artery    Carotid stenosis, right    Diastolic dysfunction    CKD (chronic kidney disease) stage 4, GFR 15-29 ml/min    Anemia due to chronic kidney disease    Copper deficiency    Low iron     CLL (chronic lymphocytic leukemia)    Perforation of left tympanic membrane    Mixed hyperlipidemia    Systolic murmur    Eustachian tube dysfunction, bilateral    Sensorineural hearing loss (SNHL), bilateral    Anticoagulated    Nasal septal deviation    Hypertrophy of inferior nasal turbinate    Unilateral recurrent inguinal hernia without obstruction or gangrene    Bilateral inguinal hernia without obstruction or gangrene    Sleep difficulties    Dermatitis associated with moisture    Proteinuria    Chronic diastolic (congestive) heart failure    Bradycardia    History of pneumonia, current treatment with cefdinir    Abnormal TSH    ESRD (end stage renal disease)         ICD-10-CM ICD-9-CM   1. PAD (peripheral artery disease)  I73.9 443.9   2. ESRD (end stage renal disease)  N18.6 585.6   3. Essential hypertension  I10 401.9   4. Mixed hyperlipidemia  E78.2 272.2             Plan: After thoroughly evaluating Ricardo Hugo, I believe the best course of action is to proceed with hemodialysis catheter placement.  The risks and benefits were explained at great length to the patient which include but are not limited to bleeding, infection, vessel damage, nerve damage and pneumothorax. The patient understands the risks and wishes for me to proceed.  We will also have him return for follow up with Dr. Pineda in 2 weeks with noninvasive testing to include vein mapping of his upper extremities to move forward with AV fistula vs graft.  I have reached out to STERLING Freeman with the kidney specialist and he agrees to move forward with dialysis.  He is aware that they will be contacting his office to set up an appointment to get dialysis started.    We had a lengthy conversation as to why we had not  previously proceeded with repair to his bilateral lower extremities.  I explained that at his last visit his GFR was 17,  and when we perform angiograms we injected I which is harmful to the kidneys so we were cautious to not harm his kidneys any further.  We discussed his need for dialysis, his neighbor who accompanied him stated that he has really felt bad over the last couple weeks and I explained considering his GFR is now 11, might have something to do with this.  The kidneys function to eradicate toxins in the body and when they are not functioning appropriately this is not happening.  My suggestion would be to him is to have a PermCath placed, begin dialysis, allow us to work him up for AV fistula versus graft (I did explain to him what the difference between the 2 were), and then we can address his lower extremity issues.  He was very grateful for all the teaching, and has decided to proceed.  I did discuss vascular risk factors as they pertain to the progression of vascular disease including controlling his hypertension and hyperlipidemia.  His blood pressure stable today in office.  His lipid panel from 2 weeks ago shows all values within normal limits except for a decreased HDL at 32.  He should continue his aspirin 81 mg daily, Plavix 75 mg daily, and Lipitor 10 mg daily in addition to his other medications.  He will take his aspirin, Plavix, and Lipitor uninterrupted up until the day prior to his procedure.  Body mass index is 19.53 kg/m².  BMI is within normal parameters. No other follow-up for BMI required.      This was all discussed in full with complete understanding.    Thank you for allowing me to participate in the care of your patient.  Please do not hesitate with any questions or concerns.  I will keep you aware of any further encounters with Ricardo Hugo.        Sincerely yours,         Melanie Butler, APRSHERITA      ADDENDUM: Patient was found to be in A-fib during preworkup, anesthesiology  requires cardiac clearance before moving forward with hemodialysis catheter insertion.

## 2025-04-10 ENCOUNTER — TELEPHONE (OUTPATIENT)
Dept: INTERNAL MEDICINE | Facility: CLINIC | Age: 75
End: 2025-04-10

## 2025-04-10 ENCOUNTER — TELEPHONE (OUTPATIENT)
Dept: VASCULAR SURGERY | Facility: CLINIC | Age: 75
End: 2025-04-10
Payer: MEDICARE

## 2025-04-10 PROBLEM — N18.6 ESRD (END STAGE RENAL DISEASE): Status: ACTIVE | Noted: 2025-04-10

## 2025-04-10 PROBLEM — Z01.818 PREOP TESTING: Status: ACTIVE | Noted: 2025-04-09

## 2025-04-10 RX ORDER — LEVOTHYROXINE SODIUM 75 UG/1
75 TABLET ORAL DAILY
Qty: 90 TABLET | Refills: 3 | OUTPATIENT
Start: 2025-04-10

## 2025-04-10 NOTE — TELEPHONE ENCOUNTER
Called pt and he says he has never had gout.  He says his right foot and toes are intensely painful.  Explained that gout usually affects one joint.  He saw vascular yesterday and I asked him if they had said that his pain was likely vascular in origin and he says the provider did not say either way, but they have him scheduled for further testing.  He has called several times about this complaint.  Advised we cannot treat as gout without being seen.  OV made for tomorrow.

## 2025-04-10 NOTE — TELEPHONE ENCOUNTER
"    Caller: Ricadro Hugo \"Harjinder\"    Relationship: Self    Best call back number:     [phone] (       What medication are you requesting: SOMETHING FOR GOUT    What are your current symptoms: PT BELIEVES HE MAY HAVE GOUT      If a prescription is needed, what is your preferred pharmacy and phone number:    [facility]    Additional notes:    PT UNDERSTANDS THAT HE MAY NEED AN APPT BEFORE THIS CAN BE PRESCRIBED AND HE VOICED UNDERSTANDING. PLEASE CALL TO ADVISE.     "

## 2025-04-11 ENCOUNTER — TELEPHONE (OUTPATIENT)
Dept: VASCULAR SURGERY | Facility: CLINIC | Age: 75
End: 2025-04-11
Payer: MEDICARE

## 2025-04-11 ENCOUNTER — TELEPHONE (OUTPATIENT)
Dept: CARDIOLOGY | Facility: CLINIC | Age: 75
End: 2025-04-11
Payer: MEDICARE

## 2025-04-11 ENCOUNTER — PRE-ADMISSION TESTING (OUTPATIENT)
Dept: PREADMISSION TESTING | Facility: HOSPITAL | Age: 75
End: 2025-04-11
Payer: MEDICARE

## 2025-04-11 ENCOUNTER — OFFICE VISIT (OUTPATIENT)
Dept: INTERNAL MEDICINE | Facility: CLINIC | Age: 75
End: 2025-04-11
Payer: MEDICARE

## 2025-04-11 VITALS
HEART RATE: 58 BPM | HEIGHT: 72 IN | BODY MASS INDEX: 19.77 KG/M2 | DIASTOLIC BLOOD PRESSURE: 52 MMHG | WEIGHT: 146 LBS | TEMPERATURE: 97.1 F | OXYGEN SATURATION: 95 % | SYSTOLIC BLOOD PRESSURE: 178 MMHG

## 2025-04-11 DIAGNOSIS — N18.6 ESRD (END STAGE RENAL DISEASE): ICD-10-CM

## 2025-04-11 DIAGNOSIS — M79.671 RIGHT FOOT PAIN: Primary | ICD-10-CM

## 2025-04-11 DIAGNOSIS — I48.91 ATRIAL FIBRILLATION, UNSPECIFIED TYPE: Primary | ICD-10-CM

## 2025-04-11 DIAGNOSIS — Z79.02 ENCOUNTER FOR MONITORING ANTIPLATELET THERAPY: ICD-10-CM

## 2025-04-11 DIAGNOSIS — Z51.81 ENCOUNTER FOR MONITORING ANTIPLATELET THERAPY: ICD-10-CM

## 2025-04-11 DIAGNOSIS — Z01.818 PREOP TESTING: ICD-10-CM

## 2025-04-11 LAB
ANION GAP SERPL CALCULATED.3IONS-SCNC: 15 MMOL/L (ref 5–15)
APTT PPP: 31.4 SECONDS (ref 24.5–36)
BASOPHILS # BLD AUTO: 0.1 10*3/MM3 (ref 0–0.2)
BASOPHILS NFR BLD AUTO: 1.8 % (ref 0–1.5)
BUN SERPL-MCNC: 84 MG/DL (ref 8–23)
BUN/CREAT SERPL: 17.9 (ref 7–25)
CALCIUM SPEC-SCNC: 8.8 MG/DL (ref 8.6–10.5)
CHLORIDE SERPL-SCNC: 106 MMOL/L (ref 98–107)
CO2 SERPL-SCNC: 19 MMOL/L (ref 22–29)
CREAT SERPL-MCNC: 4.69 MG/DL (ref 0.76–1.27)
DEPRECATED RDW RBC AUTO: 63.5 FL (ref 37–54)
EGFRCR SERPLBLD CKD-EPI 2021: 12.1 ML/MIN/1.73
EOSINOPHIL # BLD AUTO: 0.54 10*3/MM3 (ref 0–0.4)
EOSINOPHIL NFR BLD AUTO: 9.7 % (ref 0.3–6.2)
ERYTHROCYTE [DISTWIDTH] IN BLOOD BY AUTOMATED COUNT: 16.8 % (ref 12.3–15.4)
GLUCOSE SERPL-MCNC: 117 MG/DL (ref 65–99)
HCT VFR BLD AUTO: 29.4 % (ref 37.5–51)
HGB BLD-MCNC: 8.9 G/DL (ref 13–17.7)
IMM GRANULOCYTES # BLD AUTO: 0.24 10*3/MM3 (ref 0–0.05)
IMM GRANULOCYTES NFR BLD AUTO: 4.3 % (ref 0–0.5)
INR PPP: 1.28 (ref 0.91–1.09)
LYMPHOCYTES # BLD AUTO: 1.15 10*3/MM3 (ref 0.7–3.1)
LYMPHOCYTES NFR BLD AUTO: 20.7 % (ref 19.6–45.3)
MCH RBC QN AUTO: 31.8 PG (ref 26.6–33)
MCHC RBC AUTO-ENTMCNC: 30.3 G/DL (ref 31.5–35.7)
MCV RBC AUTO: 105 FL (ref 79–97)
MONOCYTES # BLD AUTO: 0.6 10*3/MM3 (ref 0.1–0.9)
MONOCYTES NFR BLD AUTO: 10.8 % (ref 5–12)
NEUTROPHILS NFR BLD AUTO: 2.93 10*3/MM3 (ref 1.7–7)
NEUTROPHILS NFR BLD AUTO: 52.7 % (ref 42.7–76)
NRBC BLD AUTO-RTO: 0.5 /100 WBC (ref 0–0.2)
PLATELET # BLD AUTO: 152 10*3/MM3 (ref 140–450)
PMV BLD AUTO: 10.4 FL (ref 6–12)
POTASSIUM SERPL-SCNC: 4.4 MMOL/L (ref 3.5–5.2)
PROTHROMBIN TIME: 16.6 SECONDS (ref 11.8–14.8)
RBC # BLD AUTO: 2.8 10*6/MM3 (ref 4.14–5.8)
SODIUM SERPL-SCNC: 140 MMOL/L (ref 136–145)
WBC NRBC COR # BLD AUTO: 5.56 10*3/MM3 (ref 3.4–10.8)

## 2025-04-11 PROCEDURE — 85025 COMPLETE CBC W/AUTO DIFF WBC: CPT

## 2025-04-11 PROCEDURE — 93005 ELECTROCARDIOGRAM TRACING: CPT

## 2025-04-11 PROCEDURE — 3077F SYST BP >= 140 MM HG: CPT

## 2025-04-11 PROCEDURE — 1125F AMNT PAIN NOTED PAIN PRSNT: CPT

## 2025-04-11 PROCEDURE — 99213 OFFICE O/P EST LOW 20 MIN: CPT

## 2025-04-11 PROCEDURE — 85610 PROTHROMBIN TIME: CPT

## 2025-04-11 PROCEDURE — 1159F MED LIST DOCD IN RCRD: CPT

## 2025-04-11 PROCEDURE — 1160F RVW MEDS BY RX/DR IN RCRD: CPT

## 2025-04-11 PROCEDURE — 93010 ELECTROCARDIOGRAM REPORT: CPT | Performed by: INTERNAL MEDICINE

## 2025-04-11 PROCEDURE — 36415 COLL VENOUS BLD VENIPUNCTURE: CPT

## 2025-04-11 PROCEDURE — 3078F DIAST BP <80 MM HG: CPT

## 2025-04-11 PROCEDURE — 85730 THROMBOPLASTIN TIME PARTIAL: CPT

## 2025-04-11 PROCEDURE — 80048 BASIC METABOLIC PNL TOTAL CA: CPT

## 2025-04-11 NOTE — TELEPHONE ENCOUNTER
Spoke with patient and informed him of EKG results and Dr. Mcguire's recommendation to procedure with surgery. Patient verbalized understanding.         ----- Message from Melanie Ayala sent at 4/11/2025  1:28 PM CDT -----  Can you please call him on his house phone and let him know.  Thank you  ----- Message -----  From: Rip Mcguire DO  Sent: 4/11/2025   1:02 PM CDT  To: STERLING eSna    EKG does NOT show Afib. Its sinus bradycardia with frequent ventricular ectopy.  Recommend proceeding as planned. Thanks!Rip  ----- Message -----  From: Melanie Ayala APRN  Sent: 4/11/2025  12:57 PM CDT  To: Rip Mcguire DO    Good afternoon Dr. Mcguire,Mr. Hugo went for preop and he was found to be in A-fib, he is going to be having a hemodialysis catheter placed on the 16th, however because of this find anesthesiologists will not put him under if he does not have cardiac clearance.  Does he need to see you?Thank you,STERLING JonesVascular Surgery

## 2025-04-11 NOTE — NURSING NOTE
Pt has no history of a-fib, EKG completed in [facility], results were called to anesthesia. Dr Garay states pt is to be cancelled and see cardiologist prior to surgery. Dr vázquez office was notified, spoke with ki. Pt is established with cardiologist and plans to call and make an appointment for surgery clearance and call md office with next steps.

## 2025-04-11 NOTE — TELEPHONE ENCOUNTER
----- Message from Rip Mcguire sent at 4/11/2025  1:01 PM CDT -----  EKG does NOT show Afib. Its sinus bradycardia with frequent ventricular ectopy.  Recommend proceeding as planned. Thanks!Rip  ----- Message -----  From: Melanie Ayala APRN  Sent: 4/11/2025  12:57 PM CDT  To: Rip Mcguire, DO    Good afternoon Dr. Mcguire,Mr. Hugo went for preop and he was found to be in A-fib, he is going to be having a hemodialysis catheter placed on the 16th, however because of this find anesthesiologists will not put him under if he does not have cardiac clearance.  Does he need to see you?Thank you,STERLING JonesVascular Surgery

## 2025-04-11 NOTE — PROGRESS NOTES
Subjective     Chief Complaint:  Possible gout    HPI:  Patient presents today for the above problems. Please see assessment and plan below.    Past Medical History:   Past Medical History:   Diagnosis Date    3-vessel CAD 08/11/2020    Allergic rhinitis     Anemia     Anxiety disorder 04/27/2020    Arthritis     Asymmetrical sensorineural hearing loss 06/28/2017    Atherosclerosis of native artery of both lower extremities with intermittent claudication 07/18/2019    Avascular necrosis of femoral head, left 07/11/2020    right hip after surgery    Carotid stenosis     Chronic mucoid otitis media     Chronic rhinitis     COPD (chronic obstructive pulmonary disease)     Coronary artery disease     HEART BYPASS 2004    Crohn's disease of large intestine with other complication 07/30/2020    Chronic diarrhea Colonoscopy July 2020 revealed mild patchy scattered hemosiderin staining with inflammation more so in rectosigmoid area.  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    Iron deficiency anemia 07/14/2020    Mixed hearing loss of left ear     PAD (peripheral artery disease) 08/11/2020    Perianal abscess     Pernicious anemia 08/17/2020    took shots but never diagnosed with b12 deficiency    Personal history of alcoholism 08/11/2020    quit drinking in 2013    Prostatic hypertrophy 08/11/2020    Sensorineural hearing loss     Sepsis with acute renal failure 09/15/2020    Shortness of breath 05/27/2021    Tinnitus     Ventricular tachycardia, nonsustained 07/14/2020    Weight loss 07/11/2020     Past Surgical History:  Past Surgical History:   Procedure Laterality Date    ARTERY  SURGERY  2021    right side on neck    CAROTID ENDARTERECTOMY Right 05/10/2021    Procedure: RIGHT CAROTID ENDARTERECTOMY WITH EEG;  Surgeon: Gil Pineda DO;  Location: [facility] HYBRID OR 12;  Service: Vascular;  Laterality: Right;    COLONOSCOPY N/A 07/02/2020    Procedure: COLONOSCOPY WITH ANESTHESIA;  Surgeon: Adrien Brewster MD;  Location: [facility] ENDOSCOPY;  Service: Gastroenterology;  Laterality: N/A;  pre op: diarrhea  post op: polyps  PCP: Joe Velasco MD    COLONOSCOPY N/A 10/13/2020    Procedure: COLONOSCOPY WITH ANESTHESIA;  Surgeon: Adrien Brewster MD;  Location: [facility] ENDOSCOPY;  Service: Gastroenterology;  Laterality: N/A;  Pre: Chronic Diarrhea, Crohn's  Post: AVM  Dr. Neftali Velasco  CO2 Inflation Used    COLONOSCOPY N/A 12/08/2023    Procedure: COLONOSCOPY WITH ANESTHESIA;  Surgeon: Adrien Brewster MD;  Location: [facility] ENDOSCOPY;  Service: Gastroenterology;  Laterality: N/A;  pre chrone's disease  post sub optimal prep, polyp, chrone's      CORONARY ARTERY BYPASS GRAFT  2003    x3    ENDOSCOPY N/A 11/02/2021    Procedure: ESOPHAGOGASTRODUODENOSCOPY WITH ANESTHESIA;  Surgeon: Bridger Bell MD;  Location: [facility] ENDOSCOPY;  Service: Gastroenterology;  Laterality: N/A;  pre anemia;gi bleed  post  gi bleed;schatski ring  Dr. ERIC Velasco    ENDOSCOPY N/A 10/10/2023    Procedure: ESOPHAGOGASTRODUODENOSCOPY WITH ANESTHESIA;  Surgeon: Adrien Brewster MD;  Location: [facility] ENDOSCOPY;  Service: Gastroenterology;  Laterality: N/A;  preop; anemia  postop esophagitis ; R/O barretts   PCP Randall Beata    EYE SURGERY Bilateral     catorac    INCISION AND DRAINAGE PERIRECTAL ABSCESS N/A 03/03/2017    Procedure: INCISION AND DRAINAGE OF JEET ANAL ABSCESS;  Surgeon: Lynette Smith MD;  Location: [facility] OR;  Service:     INGUINAL HERNIA REPAIR Bilateral 06/27/2023    Procedure: INGUINAL HERNIA BILATERAL REPAIR LAPAROSCOPIC WITH DAVINCI ROBOT WITH MESH;  Surgeon: Tahira Rivera  MD;  Location: [facility] OR;  Service: Robotics - DaVinci;  Laterality: Bilateral;    MYRINGOTOMY W/ TUBES Left 04/17/2017    06/10/2016    TONSILLECTOMY      TOTAL HIP ARTHROPLASTY Right 2006       Allergies:  Allergies   Allergen Reactions    Ondansetron Anaphylaxis and GI Intolerance    Zofran [Ondansetron Hcl] Anaphylaxis    Lortab [Hydrocodone-Acetaminophen] Other (See Comments) and Hallucinations     CLOSTROPHOBIC    Allopurinol Other (See Comments)     Pain on right side     Medications:  Prior to Admission medications    Medication Sig Start Date End Date Taking? Authorizing Provider   albuterol sulfate  (90 Base) MCG/ACT inhaler USE 2 INHALATIONS FOUR TIMES A DAY AS NEEDED FOR SHORTNESS OF AIR OR WHEEZING 3/28/25  Yes Ruthie Parra APRN   ALPRAZolam (XANAX) 0.25 MG tablet Take 1 tablet by mouth Every Night.   Yes Twyla Guevara MD   aspirin (aspirin) 81 MG EC tablet Take 1 tablet by mouth Daily.   Yes Twyla Guevara MD   atorvastatin (LIPITOR) 10 MG tablet Take 1 tablet by mouth Daily. 9/4/24  Yes Nathan Zimmer MD   azelastine (ASTELIN) 0.1 % nasal spray Administer 2 sprays into the nostril(s) as directed by provider As Needed for Rhinitis. Use in each nostril as directed   Yes Twyla Guevara MD   calcitriol (ROCALTROL) 0.5 MCG capsule Take 1 capsule by mouth Daily. 1/28/25  Yes Nathan Zimmer MD   carvedilol (COREG) 25 MG tablet Every 12 (Twelve) Hours.   Yes Twyla Guevara MD   cholestyramine light 4 g packet Take 1 packet by mouth Daily. 3/12/25  Yes Adrien Brewster MD   cloNIDine (CATAPRES) 0.3 MG tablet Take 1 tablet by mouth 3 (Three) Times a Day. 8/19/24  Yes Nathan Zimmer MD   cloNIDine (CATAPRES-TTS) 0.2 MG/24HR patch Place 1 patch on the skin as directed by provider 1 (One) Time Per Week. THURSDAYS   Yes Twyla Guevara MD   clopidogrel (PLAVIX) 75 MG tablet Take 1 tablet by mouth Daily.   Yes Twyla Guevara MD   Copper  Gluconate (Copper Caps) 2 MG capsule Take 2 mg by mouth Daily.   Yes Twyla Guevara MD   desoximetasone (TOPICORT) 0.25 % cream Apply 1 Application topically to the appropriate area as directed 2 (Two) Times a Day As Needed for Irritation. 2/17/25  Yes Nathan Zimmer MD   docusate sodium (COLACE) 100 MG capsule Take 1 capsule by mouth 3 (Three) Times a Day As Needed for Constipation.   Yes Twyla Guevara MD   Epoetin Anselmo (PROCRIT IJ) Inject 1 dose as directed 1 (One) Time Per Week. Monday   Yes Twyla Guevara MD   fexofenadine (ALLEGRA) 180 MG tablet Take 1 tablet by mouth Daily.   Yes Twyla Guevara MD   fluticasone (FLONASE) 50 MCG/ACT nasal spray USE 2 SPRAYS IN EACH NOSTRIL AS DIRECTED BY PROVIDER DAILY AS NEEDED FOR RHINITIS 3/3/25  Yes JAMA Gomez,    furosemide (Lasix) 20 MG tablet Take 0.5 tablets by mouth Daily. 2/25/25  Yes Nathan Zimmer MD   furosemide (LASIX) 40 MG tablet Take 1 tablet by mouth Daily.  Patient taking differently: Take 1 tablet by mouth 2 (Two) Times a Day. 1/28/25  Yes Nathan Zimmer MD   hydrALAZINE (APRESOLINE) 100 MG tablet Take 1 tablet by mouth 3 (Three) Times a Day. 100mg daily 8/1/23  Yes Nathan Zimmer MD   isosorbide dinitrate (ISORDIL) 10 MG tablet Take 1 tablet by mouth 3 (Three) Times a Day. 1/28/25  Yes Nathan Zimmer MD   levothyroxine (Synthroid) 100 MCG tablet Take 1 tablet by mouth Every Morning. 3/25/25  Yes Nathan Zimmer MD   magnesium chloride ER 64 MG DR tablet Take  by mouth Daily.   Yes Twyla Guevara MD   melatonin 5 MG tablet tablet Take 2 tablets by mouth Every Night.   Yes Twyla Guevara MD   mesalamine (APRISO) 0.375 g 24 hr capsule Take 4 capsules by mouth Daily. 3/12/25  Yes Adrien Brewster MD   montelukast (SINGULAIR) 10 MG tablet Take 1 tablet by mouth Every Night.   Yes Twyla Guevara MD   Multiple Vitamins-Minerals (PRESERVISION/LUTEIN) capsule Take 1  "capsule by mouth 2 (two) times a day.   Yes Twyla Guevara MD   NIFEdipine CC (ADALAT CC) 60 MG 24 hr tablet Take 2 tablets by mouth Daily for 270 days. 2/25/25 11/22/25 Yes Nathan Zimmer MD   NON FORMULARY Take 25 mg by mouth At Night As Needed. Zzzquill   Yes Twyla Guevara MD   omeprazole (priLOSEC) 20 MG capsule Take 1 capsule by mouth Daily.   Yes Twyla Guevara MD   oxymetazoline (AFRIN) 0.05 % nasal spray Administer 2 sprays into the nostril(s) as directed by provider As Needed for Congestion.   Yes Twyla Guevara MD   sodium bicarbonate 650 MG tablet Take 1 tablet by mouth 4 (Four) Times a Day.   Yes Twyla Guevara MD   spironolactone (Aldactone) 25 MG tablet Take 2 tablets by mouth Daily. 2/27/25  Yes Rip Mcguire,    tamsulosin (FLOMAX) 0.4 MG capsule 24 hr capsule Take 2 capsules by mouth Every Night. 3/11/25  Yes Ruthie Parra APRN   traMADol (ULTRAM) 50 MG tablet Take 0.5-1 tablets by mouth Every 12 (Twelve) Hours As Needed for Moderate Pain. 4/7/25  Yes Nathan Zimmer MD   valsartan (DIOVAN) 320 MG tablet Take 1 tablet by mouth Daily. 5/17/23  Yes Nathan Zimmer MD   vitamin D (ERGOCALCIFEROL) 1.25 MG (82414 UT) capsule capsule Take 1 capsule by mouth 1 (One) Time Per Week.   Yes Twyla Guevara MD       Objective     Vital Signs: /52 (BP Location: Left arm, Patient Position: Sitting, Cuff Size: Adult)   Pulse 58   Temp 97.1 °F (36.2 °C) (Temporal)   Ht 182.9 cm (72.01\")   Wt 66.2 kg (146 lb)   SpO2 95%   BMI 19.80 kg/m²   Physical Exam  Constitutional:       Appearance: Normal appearance.   Cardiovascular:      Rate and Rhythm: Normal rate.   Pulmonary:      Effort: Pulmonary effort is normal. No respiratory distress.   Feet:      Right foot:      Skin integrity: Erythema (toes erythematous, black on tip of right toe) present.      Comments: No swollen or red joints  Neurological:      Mental Status: He is alert " and oriented to person, place, and time. Mental status is at baseline.      Motor: No weakness.       Results Reviewed:  None relevant.    Assessment / Plan     Assessment/Plan:  Diagnoses and all orders for this visit:    1. Right foot pain (Primary)       History of Present Illness  The patient is a 75-year-old male who presents today with concern for possible gout.    He recently spoke with a nurse who suspected a potential diagnosis of gout. However, he remains skeptical about this diagnosis. He reports experiencing pain in his right toes, which has been persistent for approximately one month. Additionally, he notes swelling and discomfort in his ankle. He does not have any history of gout.     Assessment & Plan  1. Right foot pain.  Upon examination, the patient's symptoms do not align with a typical presentation of gout. The observed redness and black spot on the toe suggest a vascular issue rather than gout. There are no red or swollen joints that are suspicious for gout. Recommend that he continue with plan of care per vascular surgery.    Return for Next scheduled follow up. unless patient needs to be seen sooner or acute issues arise.    Patient or patient representative verbalized consent for the use of Ambient Listening during the visit with  STERLING Snell for chart documentation. 4/11/2025  16:44 CDT    I have discussed the patient results/orders and and plan/recommendation with them at today's visit.      STERLING Snell   04/11/2025

## 2025-04-11 NOTE — TELEPHONE ENCOUNTER
PT CALLED IS NEEDING SURGICAL CLEARANCE FOR DR. MCGILL      LOV 02/27/25    CHF    ON ASA 81 MG/ PLAVIX 75 MG    PLEASE ADVISE

## 2025-04-11 NOTE — DISCHARGE INSTRUCTIONS
Preparing for Surgery  Follow these instructions before the procedure:  Several days or weeks before your procedure      Ask your health care provider about:  Changing or stopping your regular medicines. This is especially important if you are taking diabetes medicines or blood thinners.  Taking medicines such as aspirin and ibuprofen. These medicines can thin your blood. Do not take these medicines unless your health care provider tells you to take them.  Taking over-the-counter medicines, vitamins, herbs, and supplements.    Contact your surgeon if you:  Develop a fever of more than 100.4°F (38°C) or other feelings of illness during the 48 hours before your surgery.  Have symptoms that get worse.  Have questions or concerns about your surgery.  If you are going home the same day of your surgery you will need to arrange for a responsible adult, age 18 years old or older, to drive you home from the hospital and stay with you for 24 hours. Verification of the  will be made prior to any procedure requiring sedation. You may not go home in a taxi or any form of public transportation by yourself.     Day before your procedure  Medication(s) you need to stop the day before your surgery:VALSARTAN    24 hours before your procedure DO NOT drink alcoholic beverages or smoke.  24 hours before your procedure STOP taking Erectile Dysfunction medication (i.e.,Cialis, Viagra)   You may be asked to shower with a germ-killing soap.  Day of your procedure   You may take the following medication(s) the morning of surgery with a sip of water: XANAX, COREG, OMEPRAZOLE, ULTRAM      8 hours before your scheduled arrival time, STOP all food, any dairy products, and full liquids. This includes hard candy, chewing gum or mints. This is extremely important to prevent serious complications.     Up to 2 hours before your scheduled arrival time, you may have clear liquids no cream, powder, or pulp of any kind. Safe options are water,  black coffee, plain tea, soda, Gatorade/Powerade, clear broth, apple juice.    2 hours before your scheduled arrival time, STOP drinking clear liquids.    You may need to take another shower with a germ-killing soap before you leave home in the morning. Do not use perfumes, colognes, or body lotions.  Wear comfortable loose-fitting clothing.  Remove all jewelry including body piercing and rings, dark colored nail polish, and make up prior to arrival at the hospital. Leave all valuables at home.   Bring your hearing aids if you rely on them.  Do not wear contact lenses. If you wear eyeglasses remember to bring a case to store them in while you are in surgery.  Do not use denture adhesives since you will be asked to remove them during your surgery.    You do not need to bring your home medications into the hospital.   Bring your sleep apnea device with you on the day of your surgery (if this applies to you).  If you have an Inspire implant for sleep apnea, please bring the remote with you on the day of surgery.  If you wear portable oxygen, bring it with you.   If you are staying overnight, you may bring a bag of items you may need such as slippers, robe and a change of clothes for your discharge. You may want to leave these items in the car until you are ready for them since your family will take your belongings when you leave the pre-operative area.  Arrive at the hospital as scheduled by the office. You will be asked to arrive 2 hours prior to your surgery time in order to prepare for your procedure.  When you arrive at the hospital  Go to the registration desk located at the main entrance of the hospital.  After registration is completed, you will be given a beeper and a sticker sheet. Take the stickers to Outpatient Surgery and place in the tray at the end of the desk to notify the staff that you have arrived and registered.   Return to the lobby to wait. You are not always called back according to the time of  arrival but rather the time your doctor will be ready.  When your beeper lights up and vibrates proceed through the double doors, under the stairs, and a member of the Outpatient Surgery staff will escort you to your preoperative room.

## 2025-04-11 NOTE — H&P
4/11/2025          Nathan Zimmer MD  [address] [place] [address]     Ricardo Hugo  1950          Chief Complaint   Patient presents with    right leg pain       Right leg is painful and foot is cold. Is upset and wants testing         Dear Nathan Zimmer MD            I had the pleasure of seeing your patient Ricardo Hugo in the office today.  As you recall, Ricardo Hugo is a 75 y.o.  male who we are following for lower extremity PAD and carotid occlusive disease.  He was found to have significant carotid disease and underwent a right carotid endarterectomy on 5/10/2021.  He is here today with complaints of right leg pain and cold foot.  He does have claudication to his lower extremities.  He follows with nephrology and his most recent GFR was 11.5 2 days ago.  Due to this, we have not recommended any intervention to his lower extremities.  He is maintained on aspirin, Plavix, and Lipitor.      Past Medical History:   Diagnosis Date    3-vessel CAD 8/11/2020    Allergic rhinitis     Anxiety disorder 4/27/2020    Arthritis     Asymmetrical sensorineural hearing loss 6/28/2017    Atherosclerosis of native artery of both lower extremities with intermittent claudication 7/18/2019    Avascular necrosis of femoral head, left 07/11/2020    right hip after surgery    Carotid stenosis     Chronic mucoid otitis media     Chronic rhinitis     Coronary artery disease     HEART BYPASS 2004    Crohn's disease of large intestine with other complication 7/30/2020    Chronic diarrhea Colonoscopy July 2020 revealed mild patchy scattered hemosiderin staining with inflammation more so in rectosigmoid area.  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    Iron deficiency anemia 7/14/2020    Mixed hearing loss of left ear     PAD (peripheral artery disease) 8/11/2020    Perianal abscess     Pernicious anemia 08/17/2020    took shots but never diagnosed with b12 deficiency    Personal history of alcoholism 08/11/2020    quit drinking in 2013    Prostatic hypertrophy 8/11/2020    Sensorineural hearing loss     Sepsis with acute renal failure 9/15/2020    Shortness of breath 5/27/2021    Tinnitus     Ventricular tachycardia, nonsustained 7/14/2020    Weight loss 7/11/2020      Past Surgical History:   Procedure Laterality Date    ARTERY SURGERY  2021    right side on neck    CAROTID ENDARTERECTOMY Right 05/10/2021    Procedure: RIGHT CAROTID ENDARTERECTOMY WITH EEG;  Surgeon: Gil Pineda DO;  Location: [facility] OR ;  Service: Vascular;  Laterality: Right;    COLONOSCOPY N/A 07/02/2020    Procedure: COLONOSCOPY WITH ANESTHESIA;  Surgeon: Adrien Brewster MD;  Location: [facility] ENDOSCOPY;  Service: Gastroenterology;  Laterality: N/A;  pre op: diarrhea  post op: polyps  PCP: Joe Velasco MD    COLONOSCOPY N/A 10/13/2020    Procedure: COLONOSCOPY WITH ANESTHESIA;  Surgeon: Adrien Brewster MD;  Location: [facility] ENDOSCOPY;  Service: Gastroenterology;  Laterality: N/A;  Pre: Chronic Diarrhea, Crohn's  Post: AVM  Dr. Neftali Velasco  CO2 Inflation Used    COLONOSCOPY N/A 12/08/2023    Procedure: COLONOSCOPY WITH ANESTHESIA;  Surgeon: Adrien Brewster MD;  Location: [facility] ENDOSCOPY;  Service: Gastroenterology;  Laterality: N/A;  pre chrone's disease  post sub optimal prep, polyp, chrone's      CORONARY ARTERY BYPASS GRAFT  2003    x3    ENDOSCOPY N/A 11/02/2021    Procedure: ESOPHAGOGASTRODUODENOSCOPY WITH ANESTHESIA;  Surgeon: Bridger Bell MD;  Location: [facility] ENDOSCOPY;  Service: Gastroenterology;  Laterality: N/A;  pre anemia;gi  bleed  post  gi bleed;fabian ring  Dr. ERIC Velasco    ENDOSCOPY N/A 10/10/2023    Procedure: ESOPHAGOGASTRODUODENOSCOPY WITH ANESTHESIA;  Surgeon: Adrien Brewster MD;  Location: [facility] ENDOSCOPY;  Service: Gastroenterology;  Laterality: N/A;  preop; anemia  postop esophagitis ; R/O barretts   PCP Randall Beata    EYE SURGERY Bilateral     catorac    INCISION AND DRAINAGE PERIRECTAL ABSCESS N/A 2017    Procedure: INCISION AND DRAINAGE OF JEET ANAL ABSCESS;  Surgeon: Lynette Smith MD;  Location: [facility] OR;  Service:     INGUINAL HERNIA REPAIR Bilateral 2023    Procedure: INGUINAL HERNIA BILATERAL REPAIR LAPAROSCOPIC WITH DAVINCI ROBOT WITH MESH;  Surgeon: Tahira Rivera MD;  Location: [facility] OR;  Service: Robotics - DaVinci;  Laterality: Bilateral;    MYRINGOTOMY W/ TUBES Left 2017    06/10/2016    TONSILLECTOMY      TOTAL HIP ARTHROPLASTY Right      Social History     Socioeconomic History    Marital status:    Tobacco Use    Smoking status: Former     Current packs/day: 0.00     Average packs/day: 0.5 packs/day for 25.8 years (12.9 ttl pk-yrs)     Types: Cigarettes     Start date:      Quit date: 10/13/2013     Years since quittin.5     Passive exposure: Past    Smokeless tobacco: Never    Tobacco comments:     quit    Vaping Use    Vaping status: Never Used   Substance and Sexual Activity    Alcohol use: Not Currently    Drug use: No    Sexual activity: Not Currently     Partners: Female     Family History   Problem Relation Age of Onset    Breast cancer Mother     Dementia Father     Glaucoma Father     No Known Problems Daughter     Colon polyps Neg Hx     Colon cancer Neg Hx      Allergies   Allergen Reactions    Ondansetron Anaphylaxis and GI Intolerance    Zofran [Ondansetron Hcl] Anaphylaxis    Lortab [Hydrocodone-Acetaminophen] Other (See Comments) and Hallucinations     CLOSTROPHOBIC    Allopurinol Other (See Comments)     Pain on right side     Current  Outpatient Medications   Medication Instructions    albuterol sulfate  (90 Base) MCG/ACT inhaler USE 2 INHALATIONS FOUR TIMES A DAY AS NEEDED FOR SHORTNESS OF AIR OR WHEEZING    ALPRAZolam (XANAX) 0.25 mg, Nightly    aspirin 81 mg, Daily    atorvastatin (LIPITOR) 10 mg, Oral, Daily    azelastine (ASTELIN) 0.1 % nasal spray 2 sprays, As Needed    calcitriol (ROCALTROL) 0.5 mcg, Oral, Daily    carvedilol (COREG) 25 MG tablet Every 12 Hours Scheduled    cholestyramine light 4 g, Oral, Daily    cloNIDine (CATAPRES) 0.3 mg, Oral, 3 Times Daily    cloNIDine (CATAPRES-TTS) 0.2 MG/24HR patch 1 patch, Weekly    clopidogrel (PLAVIX) 75 mg, Daily    Copper Gluconate (Copper Caps) 2 MG capsule 1 capsule, Daily    desoximetasone (TOPICORT) 0.25 % cream 1 Application, Topical, 2 Times Daily PRN    docusate sodium (COLACE) 100 mg, 3 Times Daily PRN    fexofenadine (ALLEGRA) 180 mg, Daily    fluticasone (FLONASE) 50 MCG/ACT nasal spray USE 2 SPRAYS IN EACH NOSTRIL AS DIRECTED BY PROVIDER DAILY AS NEEDED FOR RHINITIS    furosemide (LASIX) 40 mg, Oral, Daily    furosemide (LASIX) 10 mg, Oral, Daily    hydrALAZINE (APRESOLINE) 100 mg, Oral, 3 Times Daily, 100mg daily    isosorbide dinitrate (ISORDIL) 10 mg, Oral, 3 Times Daily (Nitrates)    levothyroxine (SYNTHROID) 100 mcg, Oral, Every Early Morning    magnesium chloride ER 64 MG DR tablet Daily    melatonin 10 mg, Nightly    mesalamine (APRISO) 1.5 g, Oral, Daily    montelukast (SINGULAIR) 10 mg, Nightly    Multiple Vitamins-Minerals (PRESERVISION/LUTEIN) capsule 1 capsule, 2 times daily    NIFEdipine CC (ADALAT CC) 120 mg, Oral, Daily    NON FORMULARY 25 mg, Nightly PRN    omeprazole (PRILOSEC) 20 mg, Daily    oxymetazoline (AFRIN) 0.05 % nasal spray 2 sprays, As Needed    sodium bicarbonate 650 mg, 4 Times Daily    spironolactone (ALDACTONE) 50 mg, Oral, Daily    tamsulosin (FLOMAX) 0.8 mg, Oral, Nightly    traMADol (ULTRAM) 25-50 mg, Oral, Every 12 Hours PRN    valsartan  "(DIOVAN) 320 mg, Oral, Daily    vitamin D (ERGOCALCIFEROL) 50,000 Units, Weekly        Review of Systems   Constitutional: Negative.  Negative for diaphoresis and fever.   HENT: Negative.     Eyes: Negative.    Respiratory: Negative.  Negative for shortness of breath and wheezing.    Cardiovascular: Negative.  Negative for chest pain and leg swelling.        Claudication to bilateral lower extremities   Gastrointestinal: Negative.  Negative for abdominal pain.   Endocrine: Negative.    Genitourinary: Negative.    Musculoskeletal:  Positive for gait problem.   Skin: Negative.    Allergic/Immunologic: Negative.    Neurological:  Negative for dizziness and weakness.   Hematological: Negative.    Psychiatric/Behavioral: Negative.           /68   Pulse 78   Ht 182.9 cm (72\")   Wt 65.3 kg (144 lb)   SpO2 96%   BMI 19.53 kg/m²          Physical Exam  Vitals and nursing note reviewed.   Constitutional:       General: He is not in acute distress.     Appearance: Normal appearance. He is not diaphoretic.   HENT:      Head: Normocephalic. No right periorbital erythema or left periorbital erythema.      Nose: Nose normal.   Eyes:      General: No scleral icterus.     Pupils: Pupils are equal.   Cardiovascular:      Rate and Rhythm: Normal rate and regular rhythm.      Pulses:           Dorsalis pedis pulses are 0 on the right side and detected w/ Doppler on the left side.        Posterior tibial pulses are detected w/ Doppler on the right side and detected w/ Doppler on the left side.      Heart sounds: Normal heart sounds. No murmur heard.  Pulmonary:      Effort: Pulmonary effort is normal. No respiratory distress.      Breath sounds: Normal breath sounds.   Abdominal:      General: Bowel sounds are normal. There is no distension.      Palpations: Abdomen is soft.      Tenderness: There is no abdominal tenderness. There is no guarding.   Musculoskeletal:         General: No swelling or tenderness. Normal range of " motion.      Cervical back: Normal range of motion and neck supple.      Right lower leg: No edema.      Left lower leg: No edema.   Feet:      Right foot:      Skin integrity: Skin integrity normal.      Left foot:      Skin integrity: Skin integrity normal.   Skin:     General: Skin is warm and dry.      Findings: No erythema or rash.   Neurological:      General: No focal deficit present.      Mental Status: He is alert and oriented to person, place, and time. Mental status is at baseline.      Cranial Nerves: No cranial nerve deficit.      Gait: Gait normal.   Psychiatric:         Attention and Perception: Attention normal.         Mood and Affect: Mood normal.         Behavior: Behavior normal.         Thought Content: Thought content normal.         Judgment: Judgment normal.             Diagnostic data:        Problem List       Patient Active Problem List   Diagnosis    Chronic rhinitis    Essential hypertension    Resistant hypertension    Chronic diarrhea    Symptomatic anemia    Wellness examination    PAD (peripheral artery disease)    IHD (ischemic heart disease)    Carotid stenosis    Stenosis of right carotid artery    Carotid stenosis, right    Diastolic dysfunction    CKD (chronic kidney disease) stage 4, GFR 15-29 ml/min    Anemia due to chronic kidney disease    Copper deficiency    Low iron     CLL (chronic lymphocytic leukemia)    Perforation of left tympanic membrane    Mixed hyperlipidemia    Systolic murmur    Eustachian tube dysfunction, bilateral    Sensorineural hearing loss (SNHL), bilateral    Anticoagulated    Nasal septal deviation    Hypertrophy of inferior nasal turbinate    Unilateral recurrent inguinal hernia without obstruction or gangrene    Bilateral inguinal hernia without obstruction or gangrene    Sleep difficulties    Dermatitis associated with moisture    Proteinuria    Chronic diastolic (congestive) heart failure    Bradycardia    History of pneumonia, current treatment  with cefdinir    Abnormal TSH    ESRD (end stage renal disease)            Visit Diagnosis       ICD-10-CM ICD-9-CM   1. PAD (peripheral artery disease)  I73.9 443.9   2. ESRD (end stage renal disease)  N18.6 585.6   3. Essential hypertension  I10 401.9   4. Mixed hyperlipidemia  E78.2 272.2                     Plan: After thoroughly evaluating Ricardo Hugo, I believe the best course of action is to proceed with hemodialysis catheter placement.  The risks and benefits were explained at great length to the patient which include but are not limited to bleeding, infection, vessel damage, nerve damage and pneumothorax. The patient understands the risks and wishes for me to proceed.  We will also have him return for follow up with Dr. Pineda in 2 weeks with noninvasive testing to include vein mapping of his upper extremities to move forward with AV fistula vs graft.  I have reached out to STERLING Freeman with the kidney specialist and he agrees to move forward with dialysis.  He is aware that they will be contacting his office to set up an appointment to get dialysis started.     We had a lengthy conversation as to why we had not previously proceeded with repair to his bilateral lower extremities.  I explained that at his last visit his GFR was 17,  and when we perform angiograms we injected I which is harmful to the kidneys so we were cautious to not harm his kidneys any further.  We discussed his need for dialysis, his neighbor who accompanied him stated that he has really felt bad over the last couple weeks and I explained considering his GFR is now 11, might have something to do with this.  The kidneys function to eradicate toxins in the body and when they are not functioning appropriately this is not happening.  My suggestion would be to him is to have a PermCath placed, begin dialysis, allow us to work him up for AV fistula versus graft (I did explain to him what the difference between the 2 were), and  then we can address his lower extremity issues.  He was very grateful for all the teaching, and has decided to proceed.  I did discuss vascular risk factors as they pertain to the progression of vascular disease including controlling his hypertension and hyperlipidemia.  His blood pressure stable today in office.  His lipid panel from 2 weeks ago shows all values within normal limits except for a decreased HDL at 32.  He should continue his aspirin 81 mg daily, Plavix 75 mg daily, and Lipitor 10 mg daily in addition to his other medications.  He will take his aspirin, Plavix, and Lipitor uninterrupted up until the day prior to his procedure.  Body mass index is 19.53 kg/m².  BMI is within normal parameters. No other follow-up for BMI required.        This was all discussed in full with complete understanding.     Thank you for allowing me to participate in the care of your patient.  Please do not hesitate with any questions or concerns.  I will keep you aware of any further encounters with Ricardo Hugo.           Sincerely yours,           Gil Pineda, DO

## 2025-04-12 LAB
QT INTERVAL: 462 MS
QTC INTERVAL: 462 MS

## 2025-04-13 ENCOUNTER — HOSPITAL ENCOUNTER (EMERGENCY)
Facility: HOSPITAL | Age: 75
Discharge: HOME | End: 2025-04-14
Attending: EMERGENCY MEDICINE | Admitting: EMERGENCY MEDICINE
Payer: MEDICARE

## 2025-04-13 ENCOUNTER — APPOINTMENT (OUTPATIENT)
Dept: GENERAL RADIOLOGY | Facility: HOSPITAL | Age: 75
End: 2025-04-13
Payer: MEDICARE

## 2025-04-13 DIAGNOSIS — R11.2 NAUSEA AND VOMITING, UNSPECIFIED VOMITING TYPE: ICD-10-CM

## 2025-04-13 DIAGNOSIS — N18.5 CKD (CHRONIC KIDNEY DISEASE) STAGE 5, GFR LESS THAN 15 ML/MIN: ICD-10-CM

## 2025-04-13 DIAGNOSIS — R53.1 WEAKNESS: Primary | ICD-10-CM

## 2025-04-13 LAB
ALBUMIN SERPL-MCNC: 4.1 G/DL (ref 3.5–5.2)
ALBUMIN/GLOB SERPL: 1.2 G/DL
ALP SERPL-CCNC: 173 U/L (ref 39–117)
ALT SERPL W P-5'-P-CCNC: 14 U/L (ref 1–41)
ANION GAP SERPL CALCULATED.3IONS-SCNC: 22 MMOL/L (ref 5–15)
APAP SERPL-MCNC: 10.4 MCG/ML (ref 0–30)
APTT PPP: 32.2 SECONDS (ref 24.5–36)
ARTERIAL PATENCY WRIST A: POSITIVE
AST SERPL-CCNC: 17 U/L (ref 1–40)
ATMOSPHERIC PRESS: 747 MMHG
BACTERIA UR QL AUTO: NORMAL /HPF
BASE EXCESS BLDA CALC-SCNC: -7.1 MMOL/L (ref 0–2)
BASOPHILS # BLD AUTO: 0.12 10*3/MM3 (ref 0–0.2)
BASOPHILS NFR BLD AUTO: 1.3 % (ref 0–1.5)
BDY SITE: ABNORMAL
BILIRUB SERPL-MCNC: 0.2 MG/DL (ref 0–1.2)
BILIRUB UR QL STRIP: NEGATIVE
BODY TEMPERATURE: 37
BUN SERPL-MCNC: 81 MG/DL (ref 8–23)
BUN/CREAT SERPL: 15.9 (ref 7–25)
CALCIUM SPEC-SCNC: 9.4 MG/DL (ref 8.6–10.5)
CHLORIDE SERPL-SCNC: 102 MMOL/L (ref 98–107)
CLARITY UR: CLEAR
CO2 SERPL-SCNC: 16 MMOL/L (ref 22–29)
COLOR UR: YELLOW
CREAT SERPL-MCNC: 5.1 MG/DL (ref 0.76–1.27)
DEPRECATED RDW RBC AUTO: 65.6 FL (ref 37–54)
EGFRCR SERPLBLD CKD-EPI 2021: 11 ML/MIN/1.73
EOSINOPHIL # BLD AUTO: 0.77 10*3/MM3 (ref 0–0.4)
EOSINOPHIL NFR BLD AUTO: 8.6 % (ref 0.3–6.2)
ERYTHROCYTE [DISTWIDTH] IN BLOOD BY AUTOMATED COUNT: 18.5 % (ref 12.3–15.4)
GLOBULIN UR ELPH-MCNC: 3.3 GM/DL
GLUCOSE SERPL-MCNC: 104 MG/DL (ref 65–99)
GLUCOSE UR STRIP-MCNC: NEGATIVE MG/DL
HCO3 BLDA-SCNC: 17.7 MMOL/L (ref 20–26)
HCT VFR BLD AUTO: 32.9 % (ref 37.5–51)
HGB BLD-MCNC: 10.2 G/DL (ref 13–17.7)
HGB UR QL STRIP.AUTO: NEGATIVE
HOLD SPECIMEN: NORMAL
HOLD SPECIMEN: NORMAL
HYALINE CASTS UR QL AUTO: NORMAL /LPF
IMM GRANULOCYTES # BLD AUTO: 0.29 10*3/MM3 (ref 0–0.05)
IMM GRANULOCYTES NFR BLD AUTO: 3.2 % (ref 0–0.5)
INR PPP: 1.23 (ref 0.91–1.09)
KETONES UR QL STRIP: NEGATIVE
LEUKOCYTE ESTERASE UR QL STRIP.AUTO: NEGATIVE
LIPASE SERPL-CCNC: 62 U/L (ref 13–60)
LYMPHOCYTES # BLD AUTO: 2.31 10*3/MM3 (ref 0.7–3.1)
LYMPHOCYTES NFR BLD AUTO: 25.8 % (ref 19.6–45.3)
Lab: ABNORMAL
MAGNESIUM SERPL-MCNC: 2.1 MG/DL (ref 1.6–2.4)
MCH RBC QN AUTO: 32.3 PG (ref 26.6–33)
MCHC RBC AUTO-ENTMCNC: 31 G/DL (ref 31.5–35.7)
MCV RBC AUTO: 104.1 FL (ref 79–97)
MODALITY: ABNORMAL
MONOCYTES # BLD AUTO: 0.71 10*3/MM3 (ref 0.1–0.9)
MONOCYTES NFR BLD AUTO: 7.9 % (ref 5–12)
NEUTROPHILS NFR BLD AUTO: 4.77 10*3/MM3 (ref 1.7–7)
NEUTROPHILS NFR BLD AUTO: 53.2 % (ref 42.7–76)
NITRITE UR QL STRIP: NEGATIVE
NRBC BLD AUTO-RTO: 0.2 /100 WBC (ref 0–0.2)
PCO2 BLDA: 32.5 MM HG (ref 35–45)
PCO2 TEMP ADJ BLD: 32.5 MM HG (ref 35–45)
PH BLDA: 7.34 PH UNITS (ref 7.35–7.45)
PH UR STRIP.AUTO: 5.5 [PH] (ref 5–8)
PH, TEMP CORRECTED: 7.34 PH UNITS (ref 7.35–7.45)
PHOSPHATE SERPL-MCNC: 6.9 MG/DL (ref 2.5–4.5)
PLATELET # BLD AUTO: 189 10*3/MM3 (ref 140–450)
PMV BLD AUTO: 9.6 FL (ref 6–12)
PO2 BLDA: 86.9 MM HG (ref 83–108)
PO2 TEMP ADJ BLD: 86.9 MM HG (ref 83–108)
POTASSIUM SERPL-SCNC: 5 MMOL/L (ref 3.5–5.2)
PROT SERPL-MCNC: 7.4 G/DL (ref 6–8.5)
PROT UR QL STRIP: ABNORMAL
PROTHROMBIN TIME: 16.2 SECONDS (ref 11.8–14.8)
RBC # BLD AUTO: 3.16 10*6/MM3 (ref 4.14–5.8)
RBC # UR STRIP: NORMAL /HPF
REF LAB TEST METHOD: NORMAL
SALICYLATES SERPL-MCNC: 15.7 MG/DL
SAO2 % BLDCOA: 95.9 % (ref 94–99)
SODIUM SERPL-SCNC: 140 MMOL/L (ref 136–145)
SP GR UR STRIP: 1.02 (ref 1–1.03)
SQUAMOUS #/AREA URNS HPF: NORMAL /HPF
UROBILINOGEN UR QL STRIP: ABNORMAL
VENTILATOR MODE: ABNORMAL
WBC # UR STRIP: NORMAL /HPF
WBC NRBC COR # BLD AUTO: 8.97 10*3/MM3 (ref 3.4–10.8)

## 2025-04-13 PROCEDURE — 71045 X-RAY EXAM CHEST 1 VIEW: CPT

## 2025-04-13 PROCEDURE — 93005 ELECTROCARDIOGRAM TRACING: CPT | Performed by: EMERGENCY MEDICINE

## 2025-04-13 PROCEDURE — 73630 X-RAY EXAM OF FOOT: CPT

## 2025-04-13 PROCEDURE — 99284 EMERGENCY DEPT VISIT MOD MDM: CPT

## 2025-04-13 PROCEDURE — 85610 PROTHROMBIN TIME: CPT | Performed by: EMERGENCY MEDICINE

## 2025-04-13 PROCEDURE — 83735 ASSAY OF MAGNESIUM: CPT | Performed by: EMERGENCY MEDICINE

## 2025-04-13 PROCEDURE — 80179 DRUG ASSAY SALICYLATE: CPT | Performed by: EMERGENCY MEDICINE

## 2025-04-13 PROCEDURE — 80143 DRUG ASSAY ACETAMINOPHEN: CPT | Performed by: EMERGENCY MEDICINE

## 2025-04-13 PROCEDURE — 25010000002 METOCLOPRAMIDE PER 10 MG: Performed by: EMERGENCY MEDICINE

## 2025-04-13 PROCEDURE — 96375 TX/PRO/DX INJ NEW DRUG ADDON: CPT

## 2025-04-13 PROCEDURE — 83690 ASSAY OF LIPASE: CPT | Performed by: EMERGENCY MEDICINE

## 2025-04-13 PROCEDURE — 84100 ASSAY OF PHOSPHORUS: CPT | Performed by: EMERGENCY MEDICINE

## 2025-04-13 PROCEDURE — 82803 BLOOD GASES ANY COMBINATION: CPT

## 2025-04-13 PROCEDURE — 25010000002 CALCIUM GLUCONATE PER 10 ML: Performed by: EMERGENCY MEDICINE

## 2025-04-13 PROCEDURE — 85025 COMPLETE CBC W/AUTO DIFF WBC: CPT | Performed by: EMERGENCY MEDICINE

## 2025-04-13 PROCEDURE — 96374 THER/PROPH/DIAG INJ IV PUSH: CPT

## 2025-04-13 PROCEDURE — 80053 COMPREHEN METABOLIC PANEL: CPT | Performed by: EMERGENCY MEDICINE

## 2025-04-13 PROCEDURE — 36600 WITHDRAWAL OF ARTERIAL BLOOD: CPT

## 2025-04-13 PROCEDURE — 25010000002 HYDRALAZINE PER 20 MG: Performed by: EMERGENCY MEDICINE

## 2025-04-13 PROCEDURE — 85730 THROMBOPLASTIN TIME PARTIAL: CPT | Performed by: EMERGENCY MEDICINE

## 2025-04-13 PROCEDURE — 81001 URINALYSIS AUTO W/SCOPE: CPT | Performed by: EMERGENCY MEDICINE

## 2025-04-13 RX ORDER — HYDRALAZINE HYDROCHLORIDE 20 MG/ML
10 INJECTION INTRAMUSCULAR; INTRAVENOUS ONCE
Status: COMPLETED | OUTPATIENT
Start: 2025-04-13 | End: 2025-04-13

## 2025-04-13 RX ORDER — CALCIUM GLUCONATE 94 MG/ML
2 INJECTION, SOLUTION INTRAVENOUS ONCE
Status: COMPLETED | OUTPATIENT
Start: 2025-04-13 | End: 2025-04-13

## 2025-04-13 RX ORDER — METOCLOPRAMIDE HYDROCHLORIDE 5 MG/ML
5 INJECTION INTRAMUSCULAR; INTRAVENOUS ONCE
Status: COMPLETED | OUTPATIENT
Start: 2025-04-13 | End: 2025-04-13

## 2025-04-13 RX ADMIN — CALCIUM GLUCONATE 2 G: 98 INJECTION, SOLUTION INTRAVENOUS at 21:00

## 2025-04-13 RX ADMIN — METOCLOPRAMIDE 5 MG: 5 INJECTION, SOLUTION INTRAMUSCULAR; INTRAVENOUS at 21:00

## 2025-04-13 RX ADMIN — HYDRALAZINE HYDROCHLORIDE 10 MG: 20 INJECTION INTRAMUSCULAR; INTRAVENOUS at 21:00

## 2025-04-14 ENCOUNTER — OFFICE VISIT (OUTPATIENT)
Dept: INTERNAL MEDICINE | Facility: CLINIC | Age: 75
End: 2025-04-14
Payer: MEDICARE

## 2025-04-14 VITALS
BODY MASS INDEX: 19.29 KG/M2 | HEIGHT: 72 IN | WEIGHT: 142.4 LBS | SYSTOLIC BLOOD PRESSURE: 195 MMHG | OXYGEN SATURATION: 95 % | RESPIRATION RATE: 22 BRPM | DIASTOLIC BLOOD PRESSURE: 80 MMHG | HEART RATE: 63 BPM | TEMPERATURE: 98.2 F

## 2025-04-14 VITALS
HEIGHT: 72 IN | SYSTOLIC BLOOD PRESSURE: 214 MMHG | HEART RATE: 76 BPM | TEMPERATURE: 97.8 F | BODY MASS INDEX: 18.42 KG/M2 | WEIGHT: 136 LBS | DIASTOLIC BLOOD PRESSURE: 64 MMHG | OXYGEN SATURATION: 97 %

## 2025-04-14 DIAGNOSIS — I73.9 PAD (PERIPHERAL ARTERY DISEASE): Primary | ICD-10-CM

## 2025-04-14 DIAGNOSIS — N18.6 ESRD (END STAGE RENAL DISEASE): ICD-10-CM

## 2025-04-14 DIAGNOSIS — I10 ESSENTIAL HYPERTENSION: ICD-10-CM

## 2025-04-14 DIAGNOSIS — I1A.0 RESISTANT HYPERTENSION: ICD-10-CM

## 2025-04-14 LAB
QT INTERVAL: 450 MS
QTC INTERVAL: 453 MS

## 2025-04-14 NOTE — ED NOTES
"Bob followed up from poison control, labs were reviewed, \"no need to treat for Tylenol overdose at this time, case closed with PC.\"  "

## 2025-04-14 NOTE — ED NOTES
"Poison Control Called, Spoke with Bob, labs suggested :  -CMP  -Tylenol level  - Aspirin level  - LFT's   - PT & INR  EKG reviewed with Bob, QRS interval and QTc is \"not alarming at this time.\"        "

## 2025-04-14 NOTE — ED PROVIDER NOTES
"EMERGENCY DEPARTMENT ATTENDING NOTE    Patient Name: Ricardo Hugo    Chief Complaint   Patient presents with    Weakness - Generalized    Hypertension    Foot Pain    Possible Medication overdose    Vomiting       PATIENT PRESENTATION:  Ricardo Hugo is a 75 y.o. male with PMH significant for CAD, hearing loss, Crohn's disease,, hypertension, carotid artery stenosis, COPD, ESRD who presents to the ED with nausea, weakness, vomiting worsening over the past 2 days.  Patient also has had right foot pain for over 3 weeks and seen his PCM and sports medicine for these issues.  He also due to his right foot pain took 12 Excedrin tablets today due to the pain because he is unable to control the pain.  Reviewed outside documentation and about to start dialysis this next week.  Patient denies any calf pain, thigh pain, fevers, skin redness.  Patient denies any wheezing or shortness of breath.  No exertional symptoms.  No radiation or abdominal pain.      PHYSICAL EXAM:   VS: BP (!) 196/75   Pulse 62   Temp 98.2 °F (36.8 °C) (Oral)   Resp 12   Ht 182.9 cm (72.01\")   Wt 64.6 kg (142 lb 6.4 oz)   SpO2 95%   BMI 19.31 kg/m²   GENERAL: well-nourished, well-developed, awake, alert, no acute distress, nontoxic appearing, comfortable  EYES: PERRL, sclerae anicteric, extraocular movements grossly intact, symmetric lids  EARS, NOSE, MOUTH, THROAT: atraumatic external nose and ears, moist mucous membranes  NECK: symmetric, trachea midline  RESPIRATORY: unlabored respiratory effort, clear to auscultation bilaterally, good air movement  CARDIOVASCULAR: no murmurs, peripheral pulses 2+ and equal in all extremities  GI: soft, nontender, nondistended  MUSCULOSKELETAL/EXTREMITIES: extremities without obvious deformity, diffuse midfoot tenderness of the right foot, no skin changes intact dorsalis pedis and posterior tibial pulse  SKIN: warm and dry with no obvious rashes  NEUROLOGIC: moving all 4 extremities symmetrically, CN " II-XII grossly intact  PSYCHIATRIC: alert, pleasant and cooperative. Appropriate mood and affect.      MEDICAL DECISION MAKING:    Ricardo Hugo is a 75 y.o. male who presented to the ED with nausea, vomiting, history of end-stage renal disease and also took 6 g of Tylenol over the past 12 hours    Procedures    Differential Diagnosis Considered: Unintentional Tylenol overdose, hyperkalemia, right foot fracture, gout, atypical ACS    Labs Ordered:  Labs Reviewed   LIPASE - Abnormal; Notable for the following components:       Result Value    Lipase 62 (*)     All other components within normal limits   URINALYSIS W/ MICROSCOPIC IF INDICATED (NO CULTURE) - Abnormal; Notable for the following components:    Protein, UA >=300 mg/dL (3+) (*)     All other components within normal limits   COMPREHENSIVE METABOLIC PANEL - Abnormal; Notable for the following components:    Glucose 104 (*)     BUN 81 (*)     Creatinine 5.10 (*)     CO2 16.0 (*)     Alkaline Phosphatase 173 (*)     Anion Gap 22.0 (*)     eGFR 11.0 (*)     All other components within normal limits    Narrative:     GFR Categories in Chronic Kidney Disease (CKD)      GFR Category          GFR (mL/min/1.73)    Interpretation  G1                     90 or greater         Normal or high (1)  G2                      60-89                Mild decrease (1)  G3a                   45-59                Mild to moderate decrease  G3b                   30-44                Moderate to severe decrease  G4                    15-29                Severe decrease  G5                    14 or less           Kidney failure          (1)In the absence of evidence of kidney disease, neither GFR category G1 or G2 fulfill the criteria for CKD.    eGFR calculation 2021 CKD-EPI creatinine equation, which does not include race as a factor   PROTIME-INR - Abnormal; Notable for the following components:    Protime 16.2 (*)     INR 1.23 (*)     All other components within normal  limits   CBC WITH AUTO DIFFERENTIAL - Abnormal; Notable for the following components:    RBC 3.16 (*)     Hemoglobin 10.2 (*)     Hematocrit 32.9 (*)     .1 (*)     MCHC 31.0 (*)     RDW 18.5 (*)     RDW-SD 65.6 (*)     Eosinophil % 8.6 (*)     Immature Grans % 3.2 (*)     Eosinophils, Absolute 0.77 (*)     Immature Grans, Absolute 0.29 (*)     All other components within normal limits   PHOSPHORUS - Abnormal; Notable for the following components:    Phosphorus 6.9 (*)     All other components within normal limits   BLOOD GAS, ARTERIAL - Abnormal; Notable for the following components:    pH, Arterial 7.344 (*)     pCO2, Arterial 32.5 (*)     HCO3, Arterial 17.7 (*)     Base Excess, Arterial -7.1 (*)     pCO2, Temperature Corrected 32.5 (*)     pH, Temp Corrected 7.344 (*)     All other components within normal limits   ACETAMINOPHEN LEVEL - Normal   SALICYLATE LEVEL - Normal   APTT - Normal    Narrative:     PTT = The equivalent PTT values for the therapeutic range of heparin levels at 0.3 to 0.7 U/ml are 77 - 99 seconds.   MAGNESIUM - Normal   BLOOD GAS, ARTERIAL   RAINBOW DRAW    Narrative:     The following orders were created for panel order [place] Draw.  Procedure                               Abnormality         Status                     ---------                               -----------         ------                     Red Top[[id number]]                                          Final result               [place] Top[[id number]]                                         Final result                 Please view results for these tests on the individual orders.   URINALYSIS, MICROSCOPIC ONLY   CBC AND DIFFERENTIAL    Narrative:     The following orders were created for panel order CBC & Differential.  Procedure                               Abnormality         Status                     ---------                               -----------         ------                     CBC Auto Differential[[id number]]         Abnormal            Final result                 Please view results for these tests on the individual orders.   RED TOP   GRAY TOP        Imaging Ordered:   XR Foot 3+ View Right   Final Result       1. Osteopenia and vascular calcifications in the soft tissues. No acute   fracture or dislocation is visualized.       This report was signed and finalized on 4/13/2025 9:01 PM by Eleuterio Rivera.          XR Chest 1 View   Final Result       1. Question increased volume overload with stable to slightly decreased   right pleural effusion and associated atelectasis.       This report was signed and finalized on 4/13/2025 8:58 PM by Eleuterio Rivera.              Internal chart review:   Past Medical History:   Diagnosis Date    3-vessel CAD 08/11/2020    Allergic rhinitis     Anemia     Anxiety disorder 04/27/2020    Arthritis     Asymmetrical sensorineural hearing loss 06/28/2017    Atherosclerosis of native artery of both lower extremities with intermittent claudication 07/18/2019    Avascular necrosis of femoral head, left 07/11/2020    right hip after surgery    Carotid stenosis     Chronic mucoid otitis media     Chronic rhinitis     COPD (chronic obstructive pulmonary disease)     Coronary artery disease     HEART BYPASS 2004    Crohn's disease of large intestine with other complication 07/30/2020    Chronic diarrhea Colonoscopy July 2020 revealed mild patchy scattered hemosiderin staining with inflammation more so in rectosigmoid area.  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    Iron deficiency anemia 07/14/2020    Mixed hearing loss of left ear     PAD (peripheral artery  disease) 08/11/2020    Perianal abscess     Pernicious anemia 08/17/2020    took shots but never diagnosed with b12 deficiency    Personal history of alcoholism 08/11/2020    quit drinking in 2013    Prostatic hypertrophy 08/11/2020    Sensorineural hearing loss     Sepsis with acute renal failure 09/15/2020    Shortness of breath 05/27/2021    Tinnitus     Ventricular tachycardia, nonsustained 07/14/2020    Weight loss 07/11/2020       Past Surgical History:   Procedure Laterality Date    ARTERY SURGERY  2021    right side on neck    CAROTID ENDARTERECTOMY Right 05/10/2021    Procedure: RIGHT CAROTID ENDARTERECTOMY WITH EEG;  Surgeon: Gil Pineda DO;  Location: [facility] OR ;  Service: Vascular;  Laterality: Right;    COLONOSCOPY N/A 07/02/2020    Procedure: COLONOSCOPY WITH ANESTHESIA;  Surgeon: Adrien Brewster MD;  Location: [facility] ENDOSCOPY;  Service: Gastroenterology;  Laterality: N/A;  pre op: diarrhea  post op: polyps  PCP: Joe Velasco MD    COLONOSCOPY N/A 10/13/2020    Procedure: COLONOSCOPY WITH ANESTHESIA;  Surgeon: Adrien Brewster MD;  Location: [facility] ENDOSCOPY;  Service: Gastroenterology;  Laterality: N/A;  Pre: Chronic Diarrhea, Crohn's  Post: AVM  Dr. Neftali Velasco  CO2 Inflation Used    COLONOSCOPY N/A 12/08/2023    Procedure: COLONOSCOPY WITH ANESTHESIA;  Surgeon: Adrien Brewster MD;  Location: [facility] ENDOSCOPY;  Service: Gastroenterology;  Laterality: N/A;  pre chrone's disease  post sub optimal prep, polyp, chrone's      CORONARY ARTERY BYPASS GRAFT  2003    x3    ENDOSCOPY N/A 11/02/2021    Procedure: ESOPHAGOGASTRODUODENOSCOPY WITH ANESTHESIA;  Surgeon: Bridger Bell MD;  Location: [facility] ENDOSCOPY;  Service: Gastroenterology;  Laterality: N/A;  pre anemia;gi bleed  post  gi bleed;schatski ring  Dr. ERIC Velasco    ENDOSCOPY N/A 10/10/2023    Procedure: ESOPHAGOGASTRODUODENOSCOPY WITH ANESTHESIA;  Surgeon: Adrien Brewster MD;  Location: [facility] ENDOSCOPY;   Service: Gastroenterology;  Laterality: N/A;  preop; anemia  postop esophagitis ; R/O barretts   PCP Randall Beata    EYE SURGERY Bilateral     catorac    INCISION AND DRAINAGE PERIRECTAL ABSCESS N/A 03/03/2017    Procedure: INCISION AND DRAINAGE OF JEET ANAL ABSCESS;  Surgeon: Lynette Smith MD;  Location:  [facility] OR;  Service:     INGUINAL HERNIA REPAIR Bilateral 06/27/2023    Procedure: INGUINAL HERNIA BILATERAL REPAIR LAPAROSCOPIC WITH DAVINCI ROBOT WITH MESH;  Surgeon: Tahira Rivera MD;  Location:  [facility] OR;  Service: Robotics - DaVinci;  Laterality: Bilateral;    MYRINGOTOMY W/ TUBES Left 04/17/2017    06/10/2016    TONSILLECTOMY      TOTAL HIP ARTHROPLASTY Right 2006       Allergies   Allergen Reactions    Ondansetron Anaphylaxis and GI Intolerance    Zofran [Ondansetron Hcl] Anaphylaxis    Lortab [Hydrocodone-Acetaminophen] Other (See Comments) and Hallucinations     CLOSTROPHOBIC    Allopurinol Other (See Comments)     Pain on right side         Current Facility-Administered Medications:     cyanocobalamin injection 1,000 mcg, 1,000 mcg, Intramuscular, Q28 Days, Fidel, [place] C, APRN, 1,000 mcg at 08/11/23 1123    Current Outpatient Medications:     albuterol sulfate  (90 Base) MCG/ACT inhaler, USE 2 INHALATIONS FOUR TIMES A DAY AS NEEDED FOR SHORTNESS OF AIR OR WHEEZING, Disp: 34 g, Rfl: 3    ALPRAZolam (XANAX) 0.25 MG tablet, Take 1 tablet by mouth Every Night., Disp: , Rfl:     aspirin (aspirin) 81 MG EC tablet, Take 1 tablet by mouth Daily., Disp: , Rfl:     atorvastatin (LIPITOR) 10 MG tablet, Take 1 tablet by mouth Daily., Disp: 90 tablet, Rfl: 3    azelastine (ASTELIN) 0.1 % nasal spray, Administer 2 sprays into the nostril(s) as directed by provider As Needed for Rhinitis. Use in each nostril as directed, Disp: , Rfl:     calcitriol (ROCALTROL) 0.5 MCG capsule, Take 1 capsule by mouth Daily., Disp: 90 capsule, Rfl: 2    carvedilol (COREG) 25 MG tablet, Every 12 (Twelve) Hours.,  Disp: , Rfl:     cholestyramine light 4 g packet, Take 1 packet by mouth Daily., Disp: 90 packet, Rfl: 1    cloNIDine (CATAPRES) 0.3 MG tablet, Take 1 tablet by mouth 3 (Three) Times a Day., Disp: 270 tablet, Rfl: 2    cloNIDine (CATAPRES-TTS) 0.2 MG/24HR patch, Place 1 patch on the skin as directed by provider 1 (One) Time Per Week. THURSDAYS, Disp: , Rfl:     clopidogrel (PLAVIX) 75 MG tablet, Take 1 tablet by mouth Daily., Disp: , Rfl:     Copper Gluconate (Copper Caps) 2 MG capsule, Take 2 mg by mouth Daily., Disp: , Rfl:     desoximetasone (TOPICORT) 0.25 % cream, Apply 1 Application topically to the appropriate area as directed 2 (Two) Times a Day As Needed for Irritation., Disp: 15 g, Rfl: 0    docusate sodium (COLACE) 100 MG capsule, Take 1 capsule by mouth 3 (Three) Times a Day As Needed for Constipation., Disp: , Rfl:     Epoetin Anselmo (PROCRIT IJ), Inject 1 dose as directed 1 (One) Time Per Week. Monday, Disp: , Rfl:     fexofenadine (ALLEGRA) 180 MG tablet, Take 1 tablet by mouth Daily., Disp: , Rfl:     fluticasone (FLONASE) 50 MCG/ACT nasal spray, USE 2 SPRAYS IN EACH NOSTRIL AS DIRECTED BY PROVIDER DAILY AS NEEDED FOR RHINITIS, Disp: 48 g, Rfl: 3    furosemide (Lasix) 20 MG tablet, Take 0.5 tablets by mouth Daily., Disp: , Rfl:     furosemide (LASIX) 40 MG tablet, Take 1 tablet by mouth Daily. (Patient taking differently: Take 1 tablet by mouth 2 (Two) Times a Day.), Disp: 90 tablet, Rfl: 2    hydrALAZINE (APRESOLINE) 100 MG tablet, Take 1 tablet by mouth 3 (Three) Times a Day. 100mg daily, Disp: , Rfl:     isosorbide dinitrate (ISORDIL) 10 MG tablet, Take 1 tablet by mouth 3 (Three) Times a Day., Disp: 270 tablet, Rfl: 2    levothyroxine (Synthroid) 100 MCG tablet, Take 1 tablet by mouth Every Morning., Disp: 90 tablet, Rfl: 2    magnesium chloride ER 64 MG DR tablet, Take  by mouth Daily., Disp: , Rfl:     melatonin 5 MG tablet tablet, Take 2 tablets by mouth Every Night., Disp: , Rfl:     mesalamine  (APRISO) 0.375 g 24 hr capsule, Take 4 capsules by mouth Daily., Disp: 360 capsule, Rfl: 1    montelukast (SINGULAIR) 10 MG tablet, Take 1 tablet by mouth Every Night., Disp: , Rfl:     Multiple Vitamins-Minerals (PRESERVISION/LUTEIN) capsule, Take 1 capsule by mouth 2 (two) times a day., Disp: , Rfl:     NIFEdipine CC (ADALAT CC) 60 MG 24 hr tablet, Take 2 tablets by mouth Daily for 270 days., Disp: , Rfl:     NON FORMULARY, Take 25 mg by mouth At Night As Needed. Zzzquill, Disp: , Rfl:     omeprazole (priLOSEC) 20 MG capsule, Take 1 capsule by mouth Daily., Disp: , Rfl:     oxymetazoline (AFRIN) 0.05 % nasal spray, Administer 2 sprays into the nostril(s) as directed by provider As Needed for Congestion., Disp: , Rfl:     sodium bicarbonate 650 MG tablet, Take 1 tablet by mouth 4 (Four) Times a Day., Disp: , Rfl:     spironolactone (Aldactone) 25 MG tablet, Take 2 tablets by mouth Daily., Disp: , Rfl:     tamsulosin (FLOMAX) 0.4 MG capsule 24 hr capsule, Take 2 capsules by mouth Every Night., Disp: , Rfl:     traMADol (ULTRAM) 50 MG tablet, Take 0.5-1 tablets by mouth Every 12 (Twelve) Hours As Needed for Moderate Pain., Disp: 30 tablet, Rfl: 0    valsartan (DIOVAN) 320 MG tablet, Take 1 tablet by mouth Daily., Disp: , Rfl:     vitamin D (ERGOCALCIFEROL) 1.25 MG (82817 UT) capsule capsule, Take 1 capsule by mouth 1 (One) Time Per Week., Disp: , Rfl:     External documents reviewed: Patient is scheduled for a dialysis port with Dr. Pinead on 16 April, reviewed nephrology assessment and plans for the past year with CKD GFR less than 20 and poorly controlled hypertension.    My EKG interpretation: EKG irregular with no consistent discernible P waves, consistent with atrial fibrillation and a rate of 61, besides no AL interval normal intervals.  T waves are peaked compared to 10 April EKG and suspect hyperkalemia.    My lab interpretation: See below    My imaging interpretation: See below    Discussed with: Patient  and family    ED Course and Re-evaluation:     ED Course as of 04/13/25 2351   Sun Apr 13, 2025 2011 Patient with end-stage renal disease, nausea, and history of anaphylaxis to Zofran, Reglan renally dosed with half dose. [JJ]   2015 Compared EKG to 11 April EKG and peak T waves that are increased from baseline.  Calcium ordered and will follow-up with additional treatments pending potassium results. [JJ]   2019 Patient without any swelling, erythema, reassuring exam so low suspicion for infected joint.  No calf or thigh symptoms or swelling, do not suspect a DVT. [JJ]   2022 Patient denies any intention for self-harm and just was trying to get his pain controlled.  Denies any other ingestions or medications. [JJ]   2044 Improved chronic anemia from baseline. [JJ]   2046 Patient is slightly acidotic [JJ]   2046 Normal Tylenol level after a chronic ingestion over the recommended dosing. [JJ]   2047 Potassium of 5.0.  Worsened GFR of 11. [JJ]   2047 The patient's severe hypertension and worsening renal disease, hydralazine ordered.  Low 60s heart rate so avoiding beta-blockers at this time. [JJ]   2048 Phosphorus is elevated but improved from 2 weeks ago. [JJ]   2050 Minimal coag elevation unchanged from baseline.  And patient without any transaminitis.  No further evaluation indicated per poison control discussion and patient will follow-up with his PCM about this.  Discussed taking medications as prescribed. [JJ]   2159 UA without concerning signs. [JJ]   2159 X-ray of the foot with osteopenia and no significant abnormalities [JJ]   2200 Chest x-ray with question increased volume overload.  Clinically patient without any significant lung sounds concerning for increased volume overload and noted significant lower extremity edema.  Stable pleural effusion on the right side. [JJ]   2348 Conducted shared decision making with patient and family.  Symptoms have improved and patient is p.o. tolerant.  Discussed not  taking more Tylenol than prescribed and will only take his Excedrin as prescribed.  He will follow-up as planned with his PCM and with his permacath on Wednesday.  Return precautions discussed for progression of disease patient was discharged asymptomatic, nontoxic and well-appearing. [JJ]      ED Course User Index  [JJ] Dmitri Trinidad MD        ED Critical Care time:     ED Diagnosis:  (R53.1) Weakness    (R11.2) Nausea and vomiting, unspecified vomiting type    (N18.5) CKD (chronic kidney disease) stage 5, GFR less than 15 ml/min     Disposition: to home  Follow up plan: PCP follow up within 2 days, return to ED immediately if symptoms worsen        Signed:  Dmitri Trinidad MD  Emergency Medicine Physician    Please note that portions of this note were completed with a voice recognition program.      Dmitri Trinidad MD  04/13/25 [id number]

## 2025-04-14 NOTE — PROGRESS NOTES
"      Chief Complaint  discuss dialysis    Subjective        Ricardo Hugo presents to [facility]    HPI    Patient here for the above problems.  See Assessment and Plan for further HPI components.      Review of Systems    Objective   Vital Signs:  BP (!) 214/64 (BP Location: Left arm, Patient Position: Sitting, Cuff Size: Adult)   Pulse 76   Temp 97.8 °F (36.6 °C) (Temporal)   Ht 182.9 cm (72.01\")   Wt 61.7 kg (136 lb)   SpO2 97%   BMI 18.44 kg/m²   Estimated body mass index is 18.44 kg/m² as calculated from the following:    Height as of this encounter: 182.9 cm (72.01\").    Weight as of this encounter: 61.7 kg (136 lb).      Physical Exam  Vitals and nursing note reviewed.   Constitutional:       Appearance: He is not ill-appearing.   Eyes:      General: No scleral icterus.     Conjunctiva/sclera: Conjunctivae normal.   Cardiovascular:      Comments: Poor DP and PT  Pulmonary:      Effort: Pulmonary effort is normal. No respiratory distress.   Neurological:      General: No focal deficit present.      Mental Status: He is alert and oriented to person, place, and time.   Psychiatric:         Mood and Affect: Mood normal.         Behavior: Behavior normal.                       Assessment and Plan   Diagnoses and all orders for this visit:    1. PAD (peripheral artery disease) (Primary)    2. ESRD (end stage renal disease)    3. Resistant hypertension    4. Essential hypertension        Patient has right foot pain.  This has been evaluated by sports medicine, the emergency department, as well as vascular surgery.  Patient has noted to have moderate arterial insufficiency of the right lower extremity at rest as well as moderate arterial deficiency of the left lower extremity at rest.  Patient has poor peripheral pulses they are palpable however.  The patient has been evaluated for dialysis and they are holding on angiogram until after the patient starts on dialysis as they " were worried about the contrast and his kidney disease.  Patient is having significant pain in his right lower extremity especially in his foot.  He has significant pain when his blood pressure spikes and the pain is improved in his feet when he has it in the dependent position.  Patient is on aspirin and Plavix as well as medication for hyperlipidemia.  Patient was recently seen in the emergency department his blood pressure was treated.  Patient does report that he likely took too much Excedrin which he knew was is not a good idea but he was in so much pain.  This is likely what has caused his blood pressure spike in recent days.    Discussed case with Dr. Pineda.  He is going to be placing an Angiocath on the patient on 4/16.  He will be setting up the angiogram after he is initiated on dialysis.  Until then patient needs to continue aspirin, Plavix, atorvastatin.  If patient gets his dialysis catheter placed on Wednesday of this week, I discussed with STERLING Freeman, He will likely start on dialysis either Friday or Monday.  Sounds as though the patient will likely be placed on a Monday Wednesday Friday schedule.  Patient's blood pressure remains elevated today.  I will avoid making any changes, as when patient start dialysis suspect that blood pressure will change drastically.  Patient was recently in emergency department and had to get IV hydralazine.    Reviewed the patient's ER visit on 4/13    Result Review :  The following data was reviewed by: Nathan Zimmer MD on 04/14/2025:   Latest Reference Range & Units 04/11/25 11:45 04/11/25 11:56 04/13/25 20:12 04/13/25 20:17 04/13/25 20:38 04/13/25 20:48 04/13/25 21:30   pH, Arterial 7.350 - 7.450 pH units     7.344 (L)     pH, Temp Corrected 7.350 - 7.450 pH Units     7.344 (L)     pCO2, Arterial 35.0 - 45.0 mm Hg     32.5 (L)     pO2, Arterial 83.0 - 108.0 mm Hg     86.9     pO2, Temperature Corrected 83 - 108 mm Hg     86.9     HCO3, Arterial 20.0  - 26.0 mmol/L     17.7 (L)     Base Excess 0.0 - 2.0 mmol/L     -7.1 (L)     O2 Saturation, Arterial 94.0 - 99.0 %     95.9     Site      Left Radial     Héctor's Test      Positive     Modality      Room Air     Ventilator Mode      NA     Barometric Pressure for Blood Gas mmHg     747     Sodium 136 - 145 mmol/L 140   140      Potassium 3.5 - 5.2 mmol/L 4.4   5.0      Chloride 98 - 107 mmol/L 106   102      CO2 22.0 - 29.0 mmol/L 19.0 (L)   16.0 (L)      Anion Gap 5.0 - 15.0 mmol/L 15.0   22.0 (H)      BUN 8 - 23 mg/dL 84 (H)   81 (H)      Creatinine 0.76 - 1.27 mg/dL 4.69 (H)   5.10 (H)      BUN/Creatinine Ratio 7.0 - 25.0  17.9   15.9      eGFR >60.0 mL/min/1.73 12.1 (L)   11.0 (L)      Glucose 65 - 99 mg/dL 117 (H)   104 (H)      Calcium 8.6 - 10.5 mg/dL 8.8   9.4      Magnesium 1.6 - 2.4 mg/dL    2.1      Phosphorus 2.5 - 4.5 mg/dL    6.9 (H)      Alkaline Phosphatase 39 - 117 U/L    173 (H)      Total Protein 6.0 - 8.5 g/dL    7.4      Albumin 3.5 - 5.2 g/dL    4.1      Globulin gm/dL    3.3      A/G Ratio g/dL    1.2      AST (SGOT) 1 - 40 U/L    17      ALT (SGPT) 1 - 41 U/L    14      Total Bilirubin 0.0 - 1.2 mg/dL    0.2      Lipase 13 - 60 U/L    62 (H)      Protime 11.8 - 14.8 Seconds 16.6 (H)   16.2 (H)      INR 0.91 - 1.09  1.28 (H)   1.23 (H)      PTT 24.5 - 36.0 seconds 31.4   32.2      WBC 3.40 - 10.80 10*3/mm3 5.56   8.97      RBC 4.14 - 5.80 10*6/mm3 2.80 (L)   3.16 (L)      Hemoglobin 13.0 - 17.7 g/dL 8.9 (L)   10.2 (L)      Hematocrit 37.5 - 51.0 % 29.4 (L)   32.9 (L)      Platelets 140 - 450 10*3/mm3 152   189      RDW 12.3 - 15.4 % 16.8 (H)   18.5 (H)      MCV 79.0 - 97.0 fL 105.0 (H)   104.1 (H)      MCH 26.6 - 33.0 pg 31.8   32.3      MCHC 31.5 - 35.7 g/dL 30.3 (L)   31.0 (L)      MPV 6.0 - 12.0 fL 10.4   9.6      RDW-SD 37.0 - 54.0 fl 63.5 (H)   65.6 (H)      Neutrophil Rel % 42.7 - 76.0 % 52.7   53.2      Lymphocyte Rel % 19.6 - 45.3 % 20.7   25.8      Monocyte Rel % 5.0 - 12.0 % 10.8    7.9      Eosinophil Rel % 0.3 - 6.2 % 9.7 (H)   8.6 (H)      Basophil Rel % 0.0 - 1.5 % 1.8 (H)   1.3      Immature Granulocyte Rel % 0.0 - 0.5 % 4.3 (H)   3.2 (H)      Neutrophils Absolute 1.70 - 7.00 10*3/mm3 2.93   4.77      Lymphocytes Absolute 0.70 - 3.10 10*3/mm3 1.15   2.31      Monocytes Absolute 0.10 - 0.90 10*3/mm3 0.60   0.71      Eosinophils Absolute 0.00 - 0.40 10*3/mm3 0.54 (H)   0.77 (H)      Basophils Absolute 0.00 - 0.20 10*3/mm3 0.10   0.12      Immature Grans, Absolute 0.00 - 0.05 10*3/mm3 0.24 (H)   0.29 (H)      nRBC 0.0 - 0.2 /100 WBC 0.5 (H)   0.2      Acetaminophen 0.0 - 30.0 mcg/mL    10.4      Salicylate <=30.0 mg/dL    15.7      Color, UA Yellow, Straw        Yellow   Appearance, UA Clear        Clear   Specific Gravity, UA 1.005 - 1.030        1.018   pH, UA 5.0 - 8.0        5.5   Glucose Negative        Negative   Ketones, UA Negative        Negative   Blood, UA Negative        Negative   Nitrite, UA Negative        Negative   Leukocytes, UA Negative        Negative   Protein, UA Negative        >=300 mg/dL (3+) !   Bilirubin, UA Negative        Negative   Urobilinogen, UA 0.2 - 1.0 E.U./dL        0.2 E.U./dL   RBC, UA None Seen, 0-2 /HPF       0-2   WBC, UA None Seen, 0-2 /HPF       0-2   Bacteria, UA None Seen /HPF       None Seen   Squamous Epithelial Cells, UA None Seen, 0-2 /HPF       0-2   Hyaline Casts, UA None Seen /LPF       None Seen   Methodology:        Automated Microscopy   XR Chest 1 View       Rpt    XR Foot 3+ View Right       Rpt    ECG 12 Lead   Rpt Rpt       QT Interval ms  462 450       QTC Interval ms  462 453       Collected by      [id number]     (L): Data is abnormally low  (H): Data is abnormally high  !: Data is abnormal  Rpt: View report in Results Review for more information                           Follow Up   Return in about 2 months (around 6/14/2025), or if symptoms worsen or fail to improve, for follow up for above problems. Longitudinal care..  Patient was  given instructions and counseling regarding his condition or for health maintenance advice. Please see specific information pulled into the AVS if appropriate.       MAHENDRA Zimmer MD, FACP, FHM      Electronically signed by Nathan Zimmer MD, 04/14/25, 5:05 PM CDT.

## 2025-04-15 ENCOUNTER — TELEPHONE (OUTPATIENT)
Dept: VASCULAR SURGERY | Facility: CLINIC | Age: 75
End: 2025-04-15
Payer: MEDICARE

## 2025-04-16 ENCOUNTER — TELEPHONE (OUTPATIENT)
Dept: INTERNAL MEDICINE | Facility: CLINIC | Age: 75
End: 2025-04-16
Payer: MEDICARE

## 2025-04-16 ENCOUNTER — ANESTHESIA (OUTPATIENT)
Dept: PERIOP | Facility: HOSPITAL | Age: 75
End: 2025-04-16
Payer: MEDICARE

## 2025-04-16 ENCOUNTER — ANESTHESIA EVENT (OUTPATIENT)
Dept: PERIOP | Facility: HOSPITAL | Age: 75
End: 2025-04-16
Payer: MEDICARE

## 2025-04-16 ENCOUNTER — HOSPITAL ENCOUNTER (OUTPATIENT)
Facility: HOSPITAL | Age: 75
Setting detail: HOSPITAL OUTPATIENT SURGERY
Discharge: HOME | End: 2025-04-16
Attending: SURGERY | Admitting: SURGERY
Payer: MEDICARE

## 2025-04-16 ENCOUNTER — TELEPHONE (OUTPATIENT)
Dept: VASCULAR SURGERY | Facility: CLINIC | Age: 75
End: 2025-04-16
Payer: MEDICARE

## 2025-04-16 ENCOUNTER — APPOINTMENT (OUTPATIENT)
Dept: INTERVENTIONAL RADIOLOGY/VASCULAR | Facility: HOSPITAL | Age: 75
End: 2025-04-16
Payer: MEDICARE

## 2025-04-16 VITALS
TEMPERATURE: 97.8 F | SYSTOLIC BLOOD PRESSURE: 184 MMHG | OXYGEN SATURATION: 97 % | HEIGHT: 71 IN | RESPIRATION RATE: 16 BRPM | HEART RATE: 55 BPM | DIASTOLIC BLOOD PRESSURE: 57 MMHG | WEIGHT: 144.84 LBS | BODY MASS INDEX: 20.28 KG/M2

## 2025-04-16 DIAGNOSIS — Z01.818 PREOP TESTING: ICD-10-CM

## 2025-04-16 DIAGNOSIS — N18.6 ESRD (END STAGE RENAL DISEASE): ICD-10-CM

## 2025-04-16 LAB
ABO GROUP BLD: NORMAL
BLD GP AB SCN SERPL QL: NEGATIVE
RH BLD: NEGATIVE
T&S EXPIRATION DATE: NORMAL

## 2025-04-16 PROCEDURE — 86900 BLOOD TYPING SEROLOGIC ABO: CPT | Performed by: NURSE PRACTITIONER

## 2025-04-16 PROCEDURE — 25010000002 HEPARIN (PORCINE) PER 1000 UNITS: Performed by: SURGERY

## 2025-04-16 PROCEDURE — 36558 INSERT TUNNELED CV CATH: CPT | Performed by: SURGERY

## 2025-04-16 PROCEDURE — 25810000003 SODIUM CHLORIDE 0.9 % SOLUTION: Performed by: SURGERY

## 2025-04-16 PROCEDURE — 77001 FLUOROGUIDE FOR VEIN DEVICE: CPT

## 2025-04-16 PROCEDURE — 77001 FLUOROGUIDE FOR VEIN DEVICE: CPT | Performed by: SURGERY

## 2025-04-16 PROCEDURE — C1894 INTRO/SHEATH, NON-LASER: HCPCS | Performed by: SURGERY

## 2025-04-16 PROCEDURE — 86901 BLOOD TYPING SEROLOGIC RH(D): CPT | Performed by: NURSE PRACTITIONER

## 2025-04-16 PROCEDURE — 76937 US GUIDE VASCULAR ACCESS: CPT | Performed by: SURGERY

## 2025-04-16 PROCEDURE — 76000 FLUOROSCOPY <1 HR PHYS/QHP: CPT

## 2025-04-16 PROCEDURE — C1750 CATH, HEMODIALYSIS,LONG-TERM: HCPCS | Performed by: SURGERY

## 2025-04-16 PROCEDURE — 25010000002 CEFAZOLIN PER 500 MG: Performed by: NURSE PRACTITIONER

## 2025-04-16 PROCEDURE — 36558 INSERT TUNNELED CV CATH: CPT

## 2025-04-16 PROCEDURE — 86850 RBC ANTIBODY SCREEN: CPT | Performed by: NURSE PRACTITIONER

## 2025-04-16 PROCEDURE — 25010000002 PROPOFOL 200 MG/20ML EMULSION: Performed by: NURSE ANESTHETIST, CERTIFIED REGISTERED

## 2025-04-16 RX ORDER — NALOXONE HCL 0.4 MG/ML
0.04 VIAL (ML) INJECTION AS NEEDED
Status: DISCONTINUED | OUTPATIENT
Start: 2025-04-16 | End: 2025-04-16 | Stop reason: HOSPADM

## 2025-04-16 RX ORDER — SODIUM CHLORIDE 0.9 % (FLUSH) 0.9 %
3 SYRINGE (ML) INJECTION AS NEEDED
Status: DISCONTINUED | OUTPATIENT
Start: 2025-04-16 | End: 2025-04-16 | Stop reason: HOSPADM

## 2025-04-16 RX ORDER — FLUMAZENIL 0.1 MG/ML
0.2 INJECTION INTRAVENOUS AS NEEDED
Status: DISCONTINUED | OUTPATIENT
Start: 2025-04-16 | End: 2025-04-16 | Stop reason: HOSPADM

## 2025-04-16 RX ORDER — HYDROCODONE BITARTRATE AND ACETAMINOPHEN 7.5; 325 MG/1; MG/1
1 TABLET ORAL EVERY 4 HOURS PRN
Status: DISCONTINUED | OUTPATIENT
Start: 2025-04-16 | End: 2025-04-16 | Stop reason: HOSPADM

## 2025-04-16 RX ORDER — TRAMADOL HYDROCHLORIDE 50 MG/1
50 TABLET ORAL EVERY 6 HOURS PRN
Qty: 20 TABLET | Refills: 0 | Status: ON HOLD | OUTPATIENT
Start: 2025-04-16

## 2025-04-16 RX ORDER — HYDROMORPHONE HYDROCHLORIDE 1 MG/ML
0.25 INJECTION, SOLUTION INTRAMUSCULAR; INTRAVENOUS; SUBCUTANEOUS
Status: DISCONTINUED | OUTPATIENT
Start: 2025-04-16 | End: 2025-04-16 | Stop reason: HOSPADM

## 2025-04-16 RX ORDER — TRAMADOL HYDROCHLORIDE 50 MG/1
50 TABLET ORAL ONCE AS NEEDED
Status: DISCONTINUED | OUTPATIENT
Start: 2025-04-16 | End: 2025-04-16 | Stop reason: HOSPADM

## 2025-04-16 RX ORDER — SODIUM CHLORIDE 0.9 % (FLUSH) 0.9 %
3 SYRINGE (ML) INJECTION EVERY 12 HOURS SCHEDULED
Status: DISCONTINUED | OUTPATIENT
Start: 2025-04-16 | End: 2025-04-16 | Stop reason: HOSPADM

## 2025-04-16 RX ORDER — SODIUM CHLORIDE 0.9 % (FLUSH) 0.9 %
3-10 SYRINGE (ML) INJECTION AS NEEDED
Status: DISCONTINUED | OUTPATIENT
Start: 2025-04-16 | End: 2025-04-16 | Stop reason: HOSPADM

## 2025-04-16 RX ORDER — PROMETHAZINE HYDROCHLORIDE 25 MG/1
12.5 TABLET ORAL ONCE AS NEEDED
Status: DISCONTINUED | OUTPATIENT
Start: 2025-04-16 | End: 2025-04-16 | Stop reason: HOSPADM

## 2025-04-16 RX ORDER — SODIUM CHLORIDE 9 MG/ML
50 INJECTION, SOLUTION INTRAVENOUS CONTINUOUS
Status: DISCONTINUED | OUTPATIENT
Start: 2025-04-16 | End: 2025-04-16 | Stop reason: HOSPADM

## 2025-04-16 RX ORDER — PROPOFOL 10 MG/ML
INJECTION, EMULSION INTRAVENOUS AS NEEDED
Status: DISCONTINUED | OUTPATIENT
Start: 2025-04-16 | End: 2025-04-16 | Stop reason: SURG

## 2025-04-16 RX ORDER — FENTANYL CITRATE 50 UG/ML
25 INJECTION, SOLUTION INTRAMUSCULAR; INTRAVENOUS
Status: DISCONTINUED | OUTPATIENT
Start: 2025-04-16 | End: 2025-04-16 | Stop reason: HOSPADM

## 2025-04-16 RX ORDER — BUPIVACAINE HYDROCHLORIDE AND EPINEPHRINE 5; 5 MG/ML; UG/ML
INJECTION, SOLUTION EPIDURAL; INTRACAUDAL; PERINEURAL AS NEEDED
Status: DISCONTINUED | OUTPATIENT
Start: 2025-04-16 | End: 2025-04-16 | Stop reason: HOSPADM

## 2025-04-16 RX ORDER — LIDOCAINE HYDROCHLORIDE 10 MG/ML
0.5 INJECTION, SOLUTION EPIDURAL; INFILTRATION; INTRACAUDAL; PERINEURAL ONCE AS NEEDED
Status: DISCONTINUED | OUTPATIENT
Start: 2025-04-16 | End: 2025-04-16 | Stop reason: HOSPADM

## 2025-04-16 RX ORDER — SODIUM CHLORIDE 9 MG/ML
100 INJECTION, SOLUTION INTRAVENOUS CONTINUOUS
Status: DISCONTINUED | OUTPATIENT
Start: 2025-04-16 | End: 2025-04-16 | Stop reason: HOSPADM

## 2025-04-16 RX ORDER — HYDROCODONE BITARTRATE AND ACETAMINOPHEN 5; 325 MG/1; MG/1
1 TABLET ORAL ONCE AS NEEDED
Status: DISCONTINUED | OUTPATIENT
Start: 2025-04-16 | End: 2025-04-16 | Stop reason: HOSPADM

## 2025-04-16 RX ORDER — HEPARIN SODIUM 1000 [USP'U]/ML
INJECTION, SOLUTION INTRAVENOUS; SUBCUTANEOUS AS NEEDED
Status: DISCONTINUED | OUTPATIENT
Start: 2025-04-16 | End: 2025-04-16 | Stop reason: HOSPADM

## 2025-04-16 RX ORDER — SODIUM CHLORIDE 9 MG/ML
40 INJECTION, SOLUTION INTRAVENOUS AS NEEDED
Status: DISCONTINUED | OUTPATIENT
Start: 2025-04-16 | End: 2025-04-16 | Stop reason: HOSPADM

## 2025-04-16 RX ORDER — LABETALOL HYDROCHLORIDE 5 MG/ML
5 INJECTION, SOLUTION INTRAVENOUS
Status: DISCONTINUED | OUTPATIENT
Start: 2025-04-16 | End: 2025-04-16 | Stop reason: HOSPADM

## 2025-04-16 RX ADMIN — PROPOFOL 30 MG: 10 INJECTION, EMULSION INTRAVENOUS at 09:14

## 2025-04-16 RX ADMIN — PROPOFOL 30 MG: 10 INJECTION, EMULSION INTRAVENOUS at 09:06

## 2025-04-16 RX ADMIN — PROPOFOL 40 MG: 10 INJECTION, EMULSION INTRAVENOUS at 08:49

## 2025-04-16 RX ADMIN — PROPOFOL 30 MG: 10 INJECTION, EMULSION INTRAVENOUS at 09:01

## 2025-04-16 RX ADMIN — PROPOFOL 30 MG: 10 INJECTION, EMULSION INTRAVENOUS at 09:21

## 2025-04-16 RX ADMIN — SODIUM CHLORIDE 2 G: 900 INJECTION INTRAVENOUS at 08:54

## 2025-04-16 RX ADMIN — SODIUM CHLORIDE 100 ML/HR: 9 INJECTION, SOLUTION INTRAVENOUS at 06:59

## 2025-04-16 RX ADMIN — PROPOFOL 30 MG: 10 INJECTION, EMULSION INTRAVENOUS at 08:55

## 2025-04-16 NOTE — TELEPHONE ENCOUNTER
Caller: jesi chavez    Relationship to patient: Emergency Contact    Best call back number: [phone]     Chief complaint: FOLLOW-UP FROM PERMA CATHETER PLACEMENT ON 04.16.25    Type of visit: OFFICE VISIT     Requested date: NONE SPECIFIED     Additional notes:    PATIENT'S DAUGHTER STATES PATIENT WAS TOLD TO FOLLOW-UP WITH DR. PAUL ONE WEEK AFTER PERMA CATHETER PLACEMENT. HUB NO AVAILABILITY WITHIN THIS TIME FRAME.     PATIENT'S DAUGHTER WAS UNSURE IF THIS APPOINTMENT WITH DR. PAUL NEEDS TO BE BEFORE PATIENT STARTS DIALYSIS ON 04.25.25. PATIENT ALSO HAS AN UPCOMING APPOINTMENT WITH DR. PAUL ON 06.03.25, AND SHE WAS UNSURE IF HE NEEDED AN ADDITIONAL APPOINTMENT PRIOR TO THIS DATE.

## 2025-04-16 NOTE — NURSING NOTE
Patient very hard of hearing and somewhat lethargic. He states that he took his apresoline,clonidine along with coreg this am.

## 2025-04-16 NOTE — OP NOTE
Ricardo Hugo  4/16/2025     PREOPERATIVE DIAGNOSIS: ESRD (end stage renal disease) [N18.6]  Preop testing [Z01.818]     POSTOPERATIVE DIAGNOSIS: Post-Op Diagnosis Codes:     * ESRD (end stage renal disease) [N18.6]     * Preop testing [Z01.818]     PROCEDURE PERFORMED:   1.  Ultrasound-guided cannulation of the right internal jugular vein  2.  Placement of a 14.5 x 19 cm tunneled PermCath  3.  Radiographic supervision and interpretation     SURGEON: Gil Pineda DO      ANESTHESIA: MAC    PREPARATION: Routine.    STAFF: Circulator: Emilia Kline RN  Scrub Person: Ruma Dunbar  Assistant: Andrea Garcia  Orientee: Ana Leal RNA  Vascular Radiology Technician: Lela Schulte    Estimated Blood Loss: minimal    SPECIMENS: None    COMPLICATIONS: None    INDICATIONS: Ricardo Hugo is a 75 y.o. male who we are following for lower extremity PAD and carotid occlusive disease. He was found to have significant carotid disease and underwent a right carotid endarterectomy on 5/10/2021. He is here today with complaints of right leg pain and cold foot. He does have claudication to his lower extremities. He follows with nephrology and his most recent GFR was 11.5 2 days ago. Due to this, we have not recommended any intervention to his lower extremities. He is maintained on aspirin, Plavix, and Lipitor. The indications, risks, and possible complications of the procedure were explained to the patient, who voiced understanding and wished to proceed with surgery.     PROCEDURE IN DETAIL:     The patient was taken to the operating room and placed on the operating table in a supine position. After MAC anesthesia was obtained, the right neck and chest were prepped and draped in a sterile manner.  Under ultrasound guidance and using a micropuncture technique the right internal jugular vein was cannulated and a microsheath was placed.  The J-wire was advanced into the IVC under fluoroscopic guidance.  Next,  10 mL of 0.5% Marcaine with epinephrine was used to infiltrate the subcutaneous tract.  Two small stab incisions were made with the 11 blade.  The tunneling device attached to the catheter was then tunneled in a subcutaneous manner up through the neck insertion site.  Next, serial dilatation was then made of the internal jugular vein under fluoroscopic guidance.  Lastly, the tear-away sheath was placed and the inner dilator and wire were removed.  The catheter was placed through the tear-away sheath with the tip ending in the right atrial/SVC junction.  There were no kinks in the catheter and both ports had good blood return.  Each port was flushed with heparinized saline and straight heparin 1000 units per mL.  The caps were applied.  The catheter was secured to skin with a 2-0 nylon.  The neck insertion site was closed with a 4-0 Monocryl in a subcuticular fashion.  The wounds were then cleaned.  Sterile dressings were applied. The patient tolerated the procedure well. Sponge and needle counts were correct. The patient was then awakened in the operating room and taken to the recovery room in good condition.    Gil Pineda DO  4/16/2025  09:38 CDT    CC:Nathan Zimmer MD

## 2025-04-16 NOTE — TELEPHONE ENCOUNTER
Spoke to Dr Zimmer and he states the patient does not need to be seen in our clinic before dialysis and that the patient can come in on the scheduled appointment on 06/03/2025.  Returned a call to the patient's daughter, per verbal with this information with voiced understanding.

## 2025-04-16 NOTE — TELEPHONE ENCOUNTER
Spoke with daughter jesi and patient has had some bleeding at the dressing, have spoke with dr. Pineda and as it is no longer bleeding patient may come to office in the am and I will change the dressing.also instructed to place ice to insertion site if it should start to bleed and if it persists he may go to ED for evaluation. Daughter has voiced understanding.

## 2025-04-16 NOTE — ANESTHESIA PREPROCEDURE EVALUATION
Anesthesia Evaluation     Patient summary reviewed   no history of anesthetic complications:   NPO Solid Status: > 8 hours             Airway   Mallampati: II  Small opening  Dental    (+) edentulous    Pulmonary    (+) a smoker Former, COPD,sleep apnea  (-) asthma  Cardiovascular   Exercise tolerance: good (4-7 METS)    (+) hypertension, valvular problems/murmurs murmur and TI, CAD, CABG (2004), CHF , PVD, hyperlipidemia,  carotid artery disease right carotid  (-) past MI, angina    ROS comment: Echo:  ·  Left ventricular systolic function is normal. Left ventricular ejection fraction appears to be 56 - 60%.  ·  Left ventricular wall thickness is consistent with mild septal asymmetric hypertrophy.  ·  Left ventricular diastolic function is consistent with (grade II w/high LAP) pseudonormalization.  ·  Normal right ventricular cavity size and systolic function noted.  ·  The left atrial cavity is mild to moderately dilated.  ·  The right atrial cavity is dilated.  ·  Mild aortic valve stenosis is present.  ·  Mild to moderate mitral valve regurgitation is present.  ·  Moderate to severe tricuspid valve regurgitation is present.  ·  Estimated right ventricular systolic pressure from tricuspid regurgitation is markedly elevated (>55 mmHg).      Neuro/Psych- negative ROS  (-) seizures, TIA, CVA  GI/Hepatic/Renal/Endo    (+) GERD, renal disease- CRI and ESRD, thyroid problem hypothyroidism  (-) liver disease, diabetes    Musculoskeletal     Abdominal    Substance History      OB/GYN          Other      history of cancer                  Anesthesia Plan    ASA 4     MAC     (Patient sleepy and without hearing aids, daughter at bedside. Patient will wake with appropriate response)  intravenous induction     Anesthetic plan, risks, benefits, and alternatives have been provided, discussed and informed consent has been obtained with: patient, child and healthcare power of .    CODE STATUS:

## 2025-04-16 NOTE — ANESTHESIA POSTPROCEDURE EVALUATION
"Patient: Ricardo Hugo    Procedure Summary       Date: 04/16/25 Room / Location: [facility] OR [address] [place] [address] HYBRID OR; [facility] INTERVENTIONAL    Anesthesia Start: 0846 Anesthesia Stop: 0938    Procedures:       IR INS TUNNELED CV CATHETER WO PORT 5 PLUS      FL C ARM DURING SURGERY      HEMODIALYSIS CATHETER PLACEMENT (Neck) Diagnosis:       ESRD (end stage renal disease)      Preop testing      (hemodialysis catheter)      (hemodialysis catheter)      (ESRD (end stage renal disease) [N18.6])      (Preop testing [Z01.818])    Scheduled Providers: Gil Pineda DO Provider: Gary Hassan CRNA    Anesthesia Type: MAC ASA Status: 4            Anesthesia Type: MAC    Vitals  Vitals Value Taken Time   /54 04/16/25 10:16   Temp 97.8 °F (36.6 °C) 04/16/25 10:00   Pulse 55 04/16/25 10:19   Resp 16 04/16/25 10:00   SpO2 95 % 04/16/25 10:19   Vitals shown include unfiled device data.        Post Anesthesia Care and Evaluation    Patient location during evaluation: PHASE II  Patient participation: complete - patient participated  Level of consciousness: awake and awake and alert  Pain score: 0  Pain management: adequate    Airway patency: patent  Anesthetic complications: No anesthetic complications  PONV Status: none  Cardiovascular status: acceptable  Respiratory status: acceptable  Hydration status: acceptable    Comments: Patient discharged according to acceptable Иван score per RN assessment. See nursing records for further information.     Blood pressure 180/50, pulse 58, temperature 97.8 °F (36.6 °C), temperature source Temporal, resp. rate 16, height 180.3 cm (70.98\"), weight 65.7 kg (144 lb 13.5 oz), SpO2 96%.      "

## 2025-04-17 ENCOUNTER — TELEPHONE (OUTPATIENT)
Dept: VASCULAR SURGERY | Facility: CLINIC | Age: 75
End: 2025-04-17
Payer: MEDICARE

## 2025-04-17 NOTE — TELEPHONE ENCOUNTER
Patient to office with soiled dressing to right upper chest wall. Dressing removed and area was cleaned with soap and water. No bleeding noted to site. 2x2 and clearsite dressing placed with no bleeding noted. Patient to call office with any bleeding or area becomes reddened or tender. Patient has voiced understanding.

## 2025-04-20 ENCOUNTER — APPOINTMENT (OUTPATIENT)
Dept: GENERAL RADIOLOGY | Facility: HOSPITAL | Age: 75
DRG: 278 | End: 2025-04-20
Payer: MEDICARE

## 2025-04-20 ENCOUNTER — HOSPITAL ENCOUNTER (INPATIENT)
Facility: HOSPITAL | Age: 75
LOS: 6 days | Discharge: HOME | DRG: 278 | End: 2025-04-26
Attending: EMERGENCY MEDICINE | Admitting: INTERNAL MEDICINE
Payer: MEDICARE

## 2025-04-20 ENCOUNTER — APPOINTMENT (OUTPATIENT)
Dept: ULTRASOUND IMAGING | Facility: HOSPITAL | Age: 75
DRG: 278 | End: 2025-04-20
Payer: MEDICARE

## 2025-04-20 ENCOUNTER — APPOINTMENT (OUTPATIENT)
Dept: CT IMAGING | Facility: HOSPITAL | Age: 75
DRG: 278 | End: 2025-04-20
Payer: MEDICARE

## 2025-04-20 DIAGNOSIS — M54.16 LUMBAR RADICULOPATHY: ICD-10-CM

## 2025-04-20 DIAGNOSIS — D64.9 ANEMIA, UNSPECIFIED TYPE: ICD-10-CM

## 2025-04-20 DIAGNOSIS — I73.9 PAD (PERIPHERAL ARTERY DISEASE): Primary | ICD-10-CM

## 2025-04-20 DIAGNOSIS — I73.9 PERIPHERAL VASCULAR DISEASE: ICD-10-CM

## 2025-04-20 DIAGNOSIS — N18.9 CHRONIC KIDNEY DISEASE, UNSPECIFIED CKD STAGE: ICD-10-CM

## 2025-04-20 DIAGNOSIS — E87.5 HYPERKALEMIA: ICD-10-CM

## 2025-04-20 DIAGNOSIS — N18.6 ESRD (END STAGE RENAL DISEASE): ICD-10-CM

## 2025-04-20 PROBLEM — M79.604 ACUTE PAIN OF LOWER EXTREMITY, RIGHT: Status: ACTIVE | Noted: 2025-04-20

## 2025-04-20 LAB
ABO GROUP BLD: NORMAL
ALBUMIN SERPL-MCNC: 3.7 G/DL (ref 3.5–5.2)
ALP SERPL-CCNC: 176 U/L (ref 39–117)
ALT SERPL W P-5'-P-CCNC: 10 U/L (ref 1–41)
ANION GAP SERPL CALCULATED.3IONS-SCNC: 18 MMOL/L (ref 5–15)
ANION GAP SERPL CALCULATED.3IONS-SCNC: 20 MMOL/L (ref 5–15)
ANISOCYTOSIS BLD QL: ABNORMAL
AST SERPL-CCNC: 17 U/L (ref 1–40)
BASOPHILS # BLD MANUAL: 0 10*3/MM3 (ref 0–0.2)
BASOPHILS NFR BLD MANUAL: 0 % (ref 0–1.5)
BILIRUB CONJ SERPL-MCNC: 0.1 MG/DL (ref 0–0.3)
BILIRUB INDIRECT SERPL-MCNC: 0.1 MG/DL
BILIRUB SERPL-MCNC: 0.2 MG/DL (ref 0–1.2)
BLD GP AB SCN SERPL QL: NEGATIVE
BUN SERPL-MCNC: 93 MG/DL (ref 8–23)
BUN SERPL-MCNC: 99 MG/DL (ref 8–23)
BUN/CREAT SERPL: 20.7 (ref 7–25)
BUN/CREAT SERPL: 22.5 (ref 7–25)
CALCIUM SPEC-SCNC: 8.6 MG/DL (ref 8.6–10.5)
CALCIUM SPEC-SCNC: 8.9 MG/DL (ref 8.6–10.5)
CHLORIDE SERPL-SCNC: 102 MMOL/L (ref 98–107)
CHLORIDE SERPL-SCNC: 103 MMOL/L (ref 98–107)
CO2 SERPL-SCNC: 15 MMOL/L (ref 22–29)
CO2 SERPL-SCNC: 16 MMOL/L (ref 22–29)
CREAT SERPL-MCNC: 4.4 MG/DL (ref 0.76–1.27)
CREAT SERPL-MCNC: 4.5 MG/DL (ref 0.76–1.27)
CREAT UR-MCNC: 22.8 MG/DL
CRP SERPL-MCNC: 2 MG/DL (ref 0–0.5)
DACRYOCYTES BLD QL SMEAR: ABNORMAL
DEPRECATED RDW RBC AUTO: 71.5 FL (ref 37–54)
EGFRCR SERPLBLD CKD-EPI 2021: 12.8 ML/MIN/1.73
EGFRCR SERPLBLD CKD-EPI 2021: 13.1 ML/MIN/1.73
EOSINOPHIL # BLD MANUAL: 0.26 10*3/MM3 (ref 0–0.4)
EOSINOPHIL NFR BLD MANUAL: 5 % (ref 0.3–6.2)
ERYTHROCYTE [DISTWIDTH] IN BLOOD BY AUTOMATED COUNT: 18.2 % (ref 12.3–15.4)
FERRITIN SERPL-MCNC: 347.6 NG/ML (ref 30–400)
GLUCOSE BLDC GLUCOMTR-MCNC: 105 MG/DL (ref 70–130)
GLUCOSE SERPL-MCNC: 119 MG/DL (ref 65–99)
GLUCOSE SERPL-MCNC: 87 MG/DL (ref 65–99)
HBV SURFACE AG SERPL QL IA: NORMAL
HCT VFR BLD AUTO: 22 % (ref 37.5–51)
HCT VFR BLD AUTO: 28.6 % (ref 37.5–51)
HGB BLD-MCNC: 6.6 G/DL (ref 13–17.7)
HGB BLD-MCNC: 8.8 G/DL (ref 13–17.7)
IRON 24H UR-MRATE: 45 MCG/DL (ref 59–158)
IRON 24H UR-MRATE: 53 MCG/DL (ref 59–158)
IRON SATN MFR SERPL: 14 % (ref 20–50)
IRON SATN MFR SERPL: 18 % (ref 20–50)
LYMPHOCYTES # BLD MANUAL: 0.63 10*3/MM3 (ref 0.7–3.1)
LYMPHOCYTES NFR BLD MANUAL: 7 % (ref 5–12)
MAGNESIUM SERPL-MCNC: 2.2 MG/DL (ref 1.6–2.4)
MCH RBC QN AUTO: 32.5 PG (ref 26.6–33)
MCHC RBC AUTO-ENTMCNC: 30 G/DL (ref 31.5–35.7)
MCV RBC AUTO: 108.4 FL (ref 79–97)
MICROCYTES BLD QL: ABNORMAL
MONOCYTES # BLD: 0.37 10*3/MM3 (ref 0.1–0.9)
NEUTROPHILS # BLD AUTO: 4.02 10*3/MM3 (ref 1.7–7)
NEUTROPHILS NFR BLD MANUAL: 76 % (ref 42.7–76)
NEUTS HYPERSEG # BLD: PRESENT 10*3/UL
OSMOLALITY UR: 367 MOSM/KG (ref 50–1400)
OVALOCYTES BLD QL SMEAR: ABNORMAL
PHOSPHATE SERPL-MCNC: 7.2 MG/DL (ref 2.5–4.5)
PLATELET # BLD AUTO: 82 10*3/MM3 (ref 140–450)
PMV BLD AUTO: 10.6 FL (ref 6–12)
POIKILOCYTOSIS BLD QL SMEAR: ABNORMAL
POLYCHROMASIA BLD QL SMEAR: ABNORMAL
POTASSIUM SERPL-SCNC: 6.1 MMOL/L (ref 3.5–5.2)
POTASSIUM SERPL-SCNC: 6.4 MMOL/L (ref 3.5–5.2)
PROT ?TM UR-MCNC: 54.1 MG/DL
PROT SERPL-MCNC: 6.7 G/DL (ref 6–8.5)
PTH-INTACT SERPL-MCNC: 141.8 PG/ML (ref 15–65)
RBC # BLD AUTO: 2.03 10*6/MM3 (ref 4.14–5.8)
RETICS # AUTO: 0.08 10*6/MM3 (ref 0.02–0.13)
RETICS/RBC NFR AUTO: 3.84 % (ref 0.7–1.9)
RH BLD: NEGATIVE
SMALL PLATELETS BLD QL SMEAR: ABNORMAL
SODIUM SERPL-SCNC: 136 MMOL/L (ref 136–145)
SODIUM SERPL-SCNC: 138 MMOL/L (ref 136–145)
SODIUM UR-SCNC: 116 MMOL/L
T&S EXPIRATION DATE: NORMAL
TIBC SERPL-MCNC: 301 MCG/DL (ref 298–536)
TIBC SERPL-MCNC: 313 MCG/DL (ref 298–536)
TRANSFERRIN SERPL-MCNC: 202 MG/DL (ref 200–360)
TRANSFERRIN SERPL-MCNC: 210 MG/DL (ref 200–360)
VARIANT LYMPHS NFR BLD MANUAL: 12 % (ref 19.6–45.3)
WBC NRBC COR # BLD AUTO: 5.29 10*3/MM3 (ref 3.4–10.8)

## 2025-04-20 PROCEDURE — 86706 HEP B SURFACE ANTIBODY: CPT | Performed by: INTERNAL MEDICINE

## 2025-04-20 PROCEDURE — 93005 ELECTROCARDIOGRAM TRACING: CPT | Performed by: EMERGENCY MEDICINE

## 2025-04-20 PROCEDURE — 86850 RBC ANTIBODY SCREEN: CPT | Performed by: EMERGENCY MEDICINE

## 2025-04-20 PROCEDURE — 86923 COMPATIBILITY TEST ELECTRIC: CPT

## 2025-04-20 PROCEDURE — 82948 REAGENT STRIP/BLOOD GLUCOSE: CPT

## 2025-04-20 PROCEDURE — 82728 ASSAY OF FERRITIN: CPT | Performed by: NURSE PRACTITIONER

## 2025-04-20 PROCEDURE — 85007 BL SMEAR W/DIFF WBC COUNT: CPT | Performed by: INTERNAL MEDICINE

## 2025-04-20 PROCEDURE — 25010000002 NA FERRIC GLUC CPLX PER 12.5 MG: Performed by: INTERNAL MEDICINE

## 2025-04-20 PROCEDURE — 86901 BLOOD TYPING SEROLOGIC RH(D): CPT | Performed by: EMERGENCY MEDICINE

## 2025-04-20 PROCEDURE — 84156 ASSAY OF PROTEIN URINE: CPT | Performed by: INTERNAL MEDICINE

## 2025-04-20 PROCEDURE — 36415 COLL VENOUS BLD VENIPUNCTURE: CPT | Performed by: NURSE PRACTITIONER

## 2025-04-20 PROCEDURE — 72131 CT LUMBAR SPINE W/O DYE: CPT

## 2025-04-20 PROCEDURE — 25810000003 SODIUM CHLORIDE 0.9 % SOLUTION: Performed by: INTERNAL MEDICINE

## 2025-04-20 PROCEDURE — 25010000002 DEXAMETHASONE PER 1 MG: Performed by: EMERGENCY MEDICINE

## 2025-04-20 PROCEDURE — P9016 RBC LEUKOCYTES REDUCED: HCPCS

## 2025-04-20 PROCEDURE — 83970 ASSAY OF PARATHORMONE: CPT | Performed by: INTERNAL MEDICINE

## 2025-04-20 PROCEDURE — 86900 BLOOD TYPING SEROLOGIC ABO: CPT

## 2025-04-20 PROCEDURE — 93925 LOWER EXTREMITY STUDY: CPT | Performed by: SURGERY

## 2025-04-20 PROCEDURE — 71045 X-RAY EXAM CHEST 1 VIEW: CPT

## 2025-04-20 PROCEDURE — 85007 BL SMEAR W/DIFF WBC COUNT: CPT | Performed by: EMERGENCY MEDICINE

## 2025-04-20 PROCEDURE — 93925 LOWER EXTREMITY STUDY: CPT

## 2025-04-20 PROCEDURE — 76775 US EXAM ABDO BACK WALL LIM: CPT

## 2025-04-20 PROCEDURE — 84300 ASSAY OF URINE SODIUM: CPT | Performed by: INTERNAL MEDICINE

## 2025-04-20 PROCEDURE — 80048 BASIC METABOLIC PNL TOTAL CA: CPT | Performed by: INTERNAL MEDICINE

## 2025-04-20 PROCEDURE — 86900 BLOOD TYPING SEROLOGIC ABO: CPT | Performed by: EMERGENCY MEDICINE

## 2025-04-20 PROCEDURE — 85025 COMPLETE CBC W/AUTO DIFF WBC: CPT | Performed by: EMERGENCY MEDICINE

## 2025-04-20 PROCEDURE — 84466 ASSAY OF TRANSFERRIN: CPT | Performed by: NURSE PRACTITIONER

## 2025-04-20 PROCEDURE — 87205 SMEAR GRAM STAIN: CPT | Performed by: INTERNAL MEDICINE

## 2025-04-20 PROCEDURE — 80076 HEPATIC FUNCTION PANEL: CPT | Performed by: INTERNAL MEDICINE

## 2025-04-20 PROCEDURE — 85045 AUTOMATED RETICULOCYTE COUNT: CPT | Performed by: NURSE PRACTITIONER

## 2025-04-20 PROCEDURE — 63710000001 PROMETHAZINE PER 25 MG: Performed by: NURSE PRACTITIONER

## 2025-04-20 PROCEDURE — 86140 C-REACTIVE PROTEIN: CPT | Performed by: EMERGENCY MEDICINE

## 2025-04-20 PROCEDURE — 25010000002 HYDRALAZINE PER 20 MG: Performed by: INTERNAL MEDICINE

## 2025-04-20 PROCEDURE — 83540 ASSAY OF IRON: CPT | Performed by: NURSE PRACTITIONER

## 2025-04-20 PROCEDURE — 83935 ASSAY OF URINE OSMOLALITY: CPT | Performed by: INTERNAL MEDICINE

## 2025-04-20 PROCEDURE — 87340 HEPATITIS B SURFACE AG IA: CPT | Performed by: INTERNAL MEDICINE

## 2025-04-20 PROCEDURE — 25010000002 HYDROMORPHONE PER 4 MG: Performed by: EMERGENCY MEDICINE

## 2025-04-20 PROCEDURE — 85060 BLOOD SMEAR INTERPRETATION: CPT | Performed by: INTERNAL MEDICINE

## 2025-04-20 PROCEDURE — P9040 RBC LEUKOREDUCED IRRADIATED: HCPCS

## 2025-04-20 PROCEDURE — 93010 ELECTROCARDIOGRAM REPORT: CPT | Performed by: STUDENT IN AN ORGANIZED HEALTH CARE EDUCATION/TRAINING PROGRAM

## 2025-04-20 PROCEDURE — 80048 BASIC METABOLIC PNL TOTAL CA: CPT | Performed by: EMERGENCY MEDICINE

## 2025-04-20 PROCEDURE — 63710000001 INSULIN REGULAR HUMAN PER 5 UNITS: Performed by: EMERGENCY MEDICINE

## 2025-04-20 PROCEDURE — 36415 COLL VENOUS BLD VENIPUNCTURE: CPT

## 2025-04-20 PROCEDURE — 36430 TRANSFUSION BLD/BLD COMPNT: CPT

## 2025-04-20 PROCEDURE — 84100 ASSAY OF PHOSPHORUS: CPT | Performed by: INTERNAL MEDICINE

## 2025-04-20 PROCEDURE — 82570 ASSAY OF URINE CREATININE: CPT | Performed by: INTERNAL MEDICINE

## 2025-04-20 PROCEDURE — 25010000002 CALCIUM GLUCONATE PER 10 ML: Performed by: EMERGENCY MEDICINE

## 2025-04-20 PROCEDURE — 99285 EMERGENCY DEPT VISIT HI MDM: CPT

## 2025-04-20 PROCEDURE — 94640 AIRWAY INHALATION TREATMENT: CPT

## 2025-04-20 PROCEDURE — 83735 ASSAY OF MAGNESIUM: CPT | Performed by: INTERNAL MEDICINE

## 2025-04-20 PROCEDURE — 85018 HEMOGLOBIN: CPT | Performed by: NURSE PRACTITIONER

## 2025-04-20 PROCEDURE — 85014 HEMATOCRIT: CPT | Performed by: NURSE PRACTITIONER

## 2025-04-20 RX ORDER — INDOMETHACIN 25 MG/1
50 CAPSULE ORAL ONCE
Status: COMPLETED | OUTPATIENT
Start: 2025-04-20 | End: 2025-04-20

## 2025-04-20 RX ORDER — FUROSEMIDE 40 MG/1
40 TABLET ORAL 2 TIMES DAILY
Status: DISCONTINUED | OUTPATIENT
Start: 2025-04-20 | End: 2025-04-24

## 2025-04-20 RX ORDER — SODIUM BICARBONATE 650 MG/1
650 TABLET ORAL 4 TIMES DAILY
Status: DISCONTINUED | OUTPATIENT
Start: 2025-04-20 | End: 2025-04-20

## 2025-04-20 RX ORDER — TAMSULOSIN HYDROCHLORIDE 0.4 MG/1
1 CAPSULE ORAL NIGHTLY
COMMUNITY

## 2025-04-20 RX ORDER — SODIUM BICARBONATE 650 MG/1
650 TABLET ORAL
COMMUNITY

## 2025-04-20 RX ORDER — DEXAMETHASONE SODIUM PHOSPHATE 10 MG/ML
10 INJECTION, SOLUTION INTRA-ARTICULAR; INTRALESIONAL; INTRAMUSCULAR; INTRAVENOUS; SOFT TISSUE ONCE
Status: COMPLETED | OUTPATIENT
Start: 2025-04-20 | End: 2025-04-20

## 2025-04-20 RX ORDER — CLOPIDOGREL BISULFATE 75 MG/1
75 TABLET ORAL DAILY
Status: DISCONTINUED | OUTPATIENT
Start: 2025-04-21 | End: 2025-04-24

## 2025-04-20 RX ORDER — ALBUTEROL SULFATE 90 UG/1
2 INHALANT RESPIRATORY (INHALATION) EVERY 6 HOURS PRN
COMMUNITY

## 2025-04-20 RX ORDER — PROMETHAZINE HYDROCHLORIDE 25 MG/1
12.5 TABLET ORAL EVERY 6 HOURS PRN
Status: DISCONTINUED | OUTPATIENT
Start: 2025-04-20 | End: 2025-04-24

## 2025-04-20 RX ORDER — PANTOPRAZOLE SODIUM 40 MG/1
40 TABLET, DELAYED RELEASE ORAL
Status: DISCONTINUED | OUTPATIENT
Start: 2025-04-21 | End: 2025-04-24

## 2025-04-20 RX ORDER — ACETAMINOPHEN 500 MG
1000 TABLET ORAL EVERY 6 HOURS PRN
Status: DISCONTINUED | OUTPATIENT
Start: 2025-04-20 | End: 2025-04-24

## 2025-04-20 RX ORDER — BISACODYL 10 MG
10 SUPPOSITORY, RECTAL RECTAL DAILY PRN
Status: DISCONTINUED | OUTPATIENT
Start: 2025-04-20 | End: 2025-04-24

## 2025-04-20 RX ORDER — SPIRONOLACTONE 50 MG/1
50 TABLET, FILM COATED ORAL DAILY
Status: DISCONTINUED | OUTPATIENT
Start: 2025-04-20 | End: 2025-04-20

## 2025-04-20 RX ORDER — CLONIDINE HYDROCHLORIDE 0.1 MG/1
0.3 TABLET ORAL 3 TIMES DAILY
Status: DISCONTINUED | OUTPATIENT
Start: 2025-04-20 | End: 2025-04-26 | Stop reason: HOSPADM

## 2025-04-20 RX ORDER — NIFEDIPINE 60 MG/1
120 TABLET, EXTENDED RELEASE ORAL DAILY
Status: DISCONTINUED | OUTPATIENT
Start: 2025-04-20 | End: 2025-04-26 | Stop reason: HOSPADM

## 2025-04-20 RX ORDER — HYDRALAZINE HYDROCHLORIDE 50 MG/1
100 TABLET, FILM COATED ORAL 3 TIMES DAILY
Status: DISCONTINUED | OUTPATIENT
Start: 2025-04-20 | End: 2025-04-26 | Stop reason: HOSPADM

## 2025-04-20 RX ORDER — FLUTICASONE PROPIONATE 50 MCG
2 SPRAY, SUSPENSION (ML) NASAL DAILY
COMMUNITY

## 2025-04-20 RX ORDER — CALCIUM GLUCONATE 94 MG/ML
1 INJECTION, SOLUTION INTRAVENOUS ONCE
Status: COMPLETED | OUTPATIENT
Start: 2025-04-20 | End: 2025-04-20

## 2025-04-20 RX ORDER — ALPRAZOLAM 0.25 MG
0.25 TABLET ORAL NIGHTLY
Status: DISCONTINUED | OUTPATIENT
Start: 2025-04-20 | End: 2025-04-24

## 2025-04-20 RX ORDER — CALCITRIOL 0.25 UG/1
0.5 CAPSULE, LIQUID FILLED ORAL DAILY
Status: DISCONTINUED | OUTPATIENT
Start: 2025-04-20 | End: 2025-04-24

## 2025-04-20 RX ORDER — ATORVASTATIN CALCIUM 10 MG/1
10 TABLET, FILM COATED ORAL DAILY
Status: DISCONTINUED | OUTPATIENT
Start: 2025-04-20 | End: 2025-04-24

## 2025-04-20 RX ORDER — MESALAMINE 0.38 G/1
1.5 CAPSULE, EXTENDED RELEASE ORAL DAILY
Status: DISCONTINUED | OUTPATIENT
Start: 2025-04-20 | End: 2025-04-24

## 2025-04-20 RX ORDER — MONTELUKAST SODIUM 10 MG/1
10 TABLET ORAL NIGHTLY
Status: DISCONTINUED | OUTPATIENT
Start: 2025-04-20 | End: 2025-04-24

## 2025-04-20 RX ORDER — LEVOTHYROXINE SODIUM 100 UG/1
100 TABLET ORAL
Status: DISCONTINUED | OUTPATIENT
Start: 2025-04-21 | End: 2025-04-24

## 2025-04-20 RX ORDER — SPIRONOLACTONE 25 MG/1
25 TABLET ORAL DAILY
COMMUNITY

## 2025-04-20 RX ORDER — HYDRALAZINE HYDROCHLORIDE 20 MG/ML
10 INJECTION INTRAMUSCULAR; INTRAVENOUS ONCE
Status: COMPLETED | OUTPATIENT
Start: 2025-04-20 | End: 2025-04-20

## 2025-04-20 RX ORDER — ISOSORBIDE DINITRATE 10 MG/1
10 TABLET ORAL
Status: DISCONTINUED | OUTPATIENT
Start: 2025-04-20 | End: 2025-04-24

## 2025-04-20 RX ORDER — CETIRIZINE HYDROCHLORIDE 10 MG/1
10 TABLET ORAL DAILY
Status: DISCONTINUED | OUTPATIENT
Start: 2025-04-20 | End: 2025-04-24

## 2025-04-20 RX ORDER — ACETAMINOPHEN 650 MG/1
650 SUPPOSITORY RECTAL EVERY 4 HOURS PRN
Status: DISCONTINUED | OUTPATIENT
Start: 2025-04-20 | End: 2025-04-24

## 2025-04-20 RX ORDER — CARVEDILOL 25 MG/1
25 TABLET ORAL EVERY 12 HOURS SCHEDULED
Status: DISCONTINUED | OUTPATIENT
Start: 2025-04-20 | End: 2025-04-26 | Stop reason: HOSPADM

## 2025-04-20 RX ORDER — BISACODYL 5 MG/1
5 TABLET, DELAYED RELEASE ORAL DAILY PRN
Status: DISCONTINUED | OUTPATIENT
Start: 2025-04-20 | End: 2025-04-24

## 2025-04-20 RX ORDER — TAMSULOSIN HYDROCHLORIDE 0.4 MG/1
0.4 CAPSULE ORAL NIGHTLY
Status: DISCONTINUED | OUTPATIENT
Start: 2025-04-20 | End: 2025-04-24

## 2025-04-20 RX ORDER — ACETAMINOPHEN 325 MG/1
650 TABLET ORAL EVERY 4 HOURS PRN
Status: DISCONTINUED | OUTPATIENT
Start: 2025-04-20 | End: 2025-04-20

## 2025-04-20 RX ORDER — ACETAMINOPHEN 160 MG/5ML
650 SOLUTION ORAL EVERY 4 HOURS PRN
Status: DISCONTINUED | OUTPATIENT
Start: 2025-04-20 | End: 2025-04-24

## 2025-04-20 RX ORDER — POLYETHYLENE GLYCOL 3350 17 G/17G
17 POWDER, FOR SOLUTION ORAL DAILY PRN
Status: DISCONTINUED | OUTPATIENT
Start: 2025-04-20 | End: 2025-04-24

## 2025-04-20 RX ORDER — ALBUTEROL SULFATE 0.83 MG/ML
10 SOLUTION RESPIRATORY (INHALATION) ONCE
Status: COMPLETED | OUTPATIENT
Start: 2025-04-20 | End: 2025-04-20

## 2025-04-20 RX ORDER — DEXTROSE MONOHYDRATE 25 G/50ML
25 INJECTION, SOLUTION INTRAVENOUS ONCE
Status: COMPLETED | OUTPATIENT
Start: 2025-04-20 | End: 2025-04-20

## 2025-04-20 RX ORDER — ASPIRIN 81 MG/1
81 TABLET ORAL DAILY
Status: DISCONTINUED | OUTPATIENT
Start: 2025-04-21 | End: 2025-04-24

## 2025-04-20 RX ORDER — SPIRONOLACTONE 25 MG/1
25 TABLET ORAL DAILY
Status: DISCONTINUED | OUTPATIENT
Start: 2025-04-21 | End: 2025-04-20

## 2025-04-20 RX ORDER — HYDROMORPHONE HYDROCHLORIDE 1 MG/ML
0.5 INJECTION, SOLUTION INTRAMUSCULAR; INTRAVENOUS; SUBCUTANEOUS ONCE
Refills: 0 | Status: COMPLETED | OUTPATIENT
Start: 2025-04-20 | End: 2025-04-20

## 2025-04-20 RX ORDER — AMOXICILLIN 250 MG
2 CAPSULE ORAL 2 TIMES DAILY PRN
Status: DISCONTINUED | OUTPATIENT
Start: 2025-04-20 | End: 2025-04-24

## 2025-04-20 RX ORDER — SODIUM BICARBONATE 650 MG/1
650 TABLET ORAL
Status: DISCONTINUED | OUTPATIENT
Start: 2025-04-20 | End: 2025-04-24

## 2025-04-20 RX ORDER — TAMSULOSIN HYDROCHLORIDE 0.4 MG/1
0.8 CAPSULE ORAL NIGHTLY
Status: DISCONTINUED | OUTPATIENT
Start: 2025-04-20 | End: 2025-04-20

## 2025-04-20 RX ORDER — CLONIDINE 0.2 MG/24H
1 PATCH, EXTENDED RELEASE TRANSDERMAL WEEKLY
Status: DISCONTINUED | OUTPATIENT
Start: 2025-04-20 | End: 2025-04-26 | Stop reason: HOSPADM

## 2025-04-20 RX ADMIN — ALPRAZOLAM 0.25 MG: 0.25 TABLET ORAL at 22:25

## 2025-04-20 RX ADMIN — MONTELUKAST SODIUM 10 MG: 10 TABLET, COATED ORAL at 22:25

## 2025-04-20 RX ADMIN — SODIUM CHLORIDE 250 MG: 9 INJECTION, SOLUTION INTRAVENOUS at 17:32

## 2025-04-20 RX ADMIN — CARVEDILOL 25 MG: 25 TABLET, FILM COATED ORAL at 22:25

## 2025-04-20 RX ADMIN — CALCIUM GLUCONATE 1 G: 98 INJECTION, SOLUTION INTRAVENOUS at 10:51

## 2025-04-20 RX ADMIN — SODIUM BICARBONATE 50 MEQ: 84 INJECTION INTRAVENOUS at 10:51

## 2025-04-20 RX ADMIN — SODIUM BICARBONATE 650 MG: 650 TABLET ORAL at 17:22

## 2025-04-20 RX ADMIN — ALBUTEROL SULFATE 10 MG: 2.5 SOLUTION RESPIRATORY (INHALATION) at 10:59

## 2025-04-20 RX ADMIN — FUROSEMIDE 40 MG: 40 TABLET ORAL at 22:25

## 2025-04-20 RX ADMIN — PROMETHAZINE HYDROCHLORIDE 12.5 MG: 25 TABLET ORAL at 23:05

## 2025-04-20 RX ADMIN — ISOSORBIDE DINITRATE 10 MG: 10 TABLET ORAL at 14:03

## 2025-04-20 RX ADMIN — SODIUM ZIRCONIUM CYCLOSILICATE 10 G: 10 POWDER, FOR SUSPENSION ORAL at 22:25

## 2025-04-20 RX ADMIN — NIFEDIPINE 120 MG: 60 TABLET, FILM COATED, EXTENDED RELEASE ORAL at 17:33

## 2025-04-20 RX ADMIN — CLONIDINE HYDROCHLORIDE 0.3 MG: 0.1 TABLET ORAL at 22:25

## 2025-04-20 RX ADMIN — DEXAMETHASONE SODIUM PHOSPHATE 10 MG: 10 INJECTION INTRAMUSCULAR; INTRAVENOUS at 08:49

## 2025-04-20 RX ADMIN — TAMSULOSIN HYDROCHLORIDE 0.4 MG: 0.4 CAPSULE ORAL at 22:25

## 2025-04-20 RX ADMIN — SODIUM ZIRCONIUM CYCLOSILICATE 10 G: 10 POWDER, FOR SUSPENSION ORAL at 17:21

## 2025-04-20 RX ADMIN — ISOSORBIDE DINITRATE 10 MG: 10 TABLET ORAL at 22:31

## 2025-04-20 RX ADMIN — ACETAMINOPHEN 1000 MG: 500 TABLET, FILM COATED ORAL at 17:21

## 2025-04-20 RX ADMIN — HYDRALAZINE HYDROCHLORIDE 100 MG: 50 TABLET ORAL at 22:25

## 2025-04-20 RX ADMIN — SODIUM BICARBONATE 650 MG: 650 TABLET ORAL at 22:25

## 2025-04-20 RX ADMIN — HYDRALAZINE HYDROCHLORIDE 10 MG: 20 INJECTION INTRAMUSCULAR; INTRAVENOUS at 16:19

## 2025-04-20 RX ADMIN — INSULIN HUMAN 6 UNITS: 100 INJECTION, SOLUTION PARENTERAL at 10:47

## 2025-04-20 RX ADMIN — DEXTROSE MONOHYDRATE 25 G: 25 INJECTION, SOLUTION INTRAVENOUS at 10:51

## 2025-04-20 RX ADMIN — HYDROMORPHONE HYDROCHLORIDE 0.5 MG: 1 INJECTION, SOLUTION INTRAMUSCULAR; INTRAVENOUS; SUBCUTANEOUS at 08:49

## 2025-04-20 RX ADMIN — Medication 10 MG: at 22:25

## 2025-04-20 RX ADMIN — SODIUM ZIRCONIUM CYCLOSILICATE 10 G: 10 POWDER, FOR SUSPENSION ORAL at 10:51

## 2025-04-20 NOTE — CONSULTS
Nephrology ([facility]) Consult Note      Patient:  Ricardo Hugo  YOB: 1950  Date of Service: 4/20/2025  MRN: [id number]   Acct: [id number]   Primary Care Physician: Nathan Zimmer MD  Advance Directive:   Code Status and Medical Interventions: CPR (Attempt to Resuscitate); Full Support   Ordered at: 04/20/25 1319     Code Status (Patient has no pulse and is not breathing):    CPR (Attempt to Resuscitate)     Medical Interventions (Patient has pulse or is breathing):    Full Support     Level Of Support Discussed With:    Patient     Admit Date: 4/20/2025       Hospital Day: 0  Referring Provider: No Known Provider      Patient Seen, Chart, Consults, Notes, Labs, Radiology studies reviewed.    Chief complaint: Abnormal labs.    Subjective:  Ricardo Hugo is a 75 y.o. male  whom we were consulted for stage V chronic renal disease/end-stage renal disease.  Patient was last seen in the office was on 23 March when he was found to have a GFR of 10 mL.  He has permacatheter placement on Wednesday/April 16 to initiate dialysis early next week.  Presented to the emergency room as he was complaining of sciatic pain radiating to his right foot.  It was severe and excruciating.  Routine lab data indicated, his serum creatinine was 4.5 mg/estimated GFR of 13 mL.  Patient was found to be profoundly anemic as well.  He is currently receiving blood transfusion as his hemoglobin is only 6.6 g.  He denies any active GI bleed.  His potassium was 6.6 mmol.  He is now admitted for the treatment of renal renal failure, uremic manifestation, hyperkalemia and anemia of chronic kidney disease.  He has right IJ permacatheter and ready to use.  However he denies any shortness of breath or dyspnea on exertion or swelling of legs.  His O2 saturation is 99%.  He has a history of coronary artery disease, COPD, Crohn's disease, hypertension and hypertensive nephrosclerosis.    This afternoon he  looks comfortable, no longer right leg pain has he was given Toradol in the emergency room.  He is now ready to initiate dialysis which I will schedule in the morning    Allergies:  Ondansetron, Zofran [ondansetron hcl], Lortab [hydrocodone-acetaminophen], and Allopurinol    Home Meds:  Facility-Administered Medications Prior to Admission   Medication Dose Route Frequency Provider Last Rate Last Admin    cyanocobalamin injection 1,000 mcg  1,000 mcg Intramuscular Q28 Days Ruthie Parra, APRN   1,000 mcg at 08/11/23 1123     Medications Prior to Admission   Medication Sig Dispense Refill Last Dose/Taking    albuterol sulfate  (90 Base) MCG/ACT inhaler Inhale 2 puffs Every 6 (Six) Hours As Needed for Wheezing or Shortness of Air.   Past Week    ALPRAZolam (XANAX) 0.25 MG tablet Take 1 tablet by mouth Every Night.   Past Week    aspirin (aspirin) 81 MG EC tablet Take 1 tablet by mouth Daily.   Past Week    atorvastatin (LIPITOR) 10 MG tablet Take 1 tablet by mouth Daily. 90 tablet 3 Past Week    calcitriol (ROCALTROL) 0.5 MCG capsule Take 1 capsule by mouth Daily. 90 capsule 2 Past Week    carvedilol (COREG) 25 MG tablet Take 1 tablet by mouth 2 (Two) Times a Day With Meals.   Past Week    cholestyramine light 4 g packet Take 1 packet by mouth Daily. 90 packet 1 Past Week    cloNIDine (CATAPRES) 0.3 MG tablet Take 1 tablet by mouth 3 (Three) Times a Day. 270 tablet 2 Past Week    cloNIDine (CATAPRES-TTS) 0.2 MG/24HR patch Place 1 patch on the skin as directed by provider 1 (One) Time Per Week. THURSDAYS   Past Week    clopidogrel (PLAVIX) 75 MG tablet Take 1 tablet by mouth Daily.   Past Week    Copper Gluconate (Copper Caps) 2 MG capsule Take 2 mg by mouth Daily.   Past Week    docusate sodium (COLACE) 100 MG capsule Take 1 capsule by mouth 3 (Three) Times a Day As Needed for Constipation.   Past Week    Epoetin Anselmo (PROCRIT IJ) Inject 1 dose as directed 1 (One) Time Per Week. Monday   Past Month     fexofenadine (ALLEGRA) 180 MG tablet Take 1 tablet by mouth Daily.   Past Week    fluticasone (FLONASE) 50 MCG/ACT nasal spray Administer 2 sprays into the nostril(s) as directed by provider Daily.   Past Week    furosemide (Lasix) 20 MG tablet Take 0.5 tablets by mouth Daily.   Past Week    furosemide (LASIX) 40 MG tablet Take 1 tablet by mouth Daily. 90 tablet 2 Past Week    hydrALAZINE (APRESOLINE) 100 MG tablet Take 1 tablet by mouth 3 (Three) Times a Day. 100mg daily   Past Week    isosorbide dinitrate (ISORDIL) 10 MG tablet Take 1 tablet by mouth 3 (Three) Times a Day. 270 tablet 2 Past Week    levothyroxine (Synthroid) 100 MCG tablet Take 1 tablet by mouth Every Morning. 90 tablet 2 Past Week    magnesium chloride ER 64 MG DR tablet Take 1 tablet by mouth Daily.   Past Week    melatonin 5 MG tablet tablet Take 2 tablets by mouth Every Night. (Patient taking differently: Take 3 tablets by mouth Every Night. Patient taking 3 tablets per night)   Past Week    mesalamine (APRISO) 0.375 g 24 hr capsule Take 4 capsules by mouth Daily. 360 capsule 1 Past Week    montelukast (SINGULAIR) 10 MG tablet Take 1 tablet by mouth Every Night.   Past Week    Multiple Vitamins-Minerals (PRESERVISION/LUTEIN) capsule Take 1 capsule by mouth 2 (two) times a day.   Past Week    NIFEdipine CC (ADALAT CC) 60 MG 24 hr tablet Take 2 tablets by mouth Daily for 270 days.   Past Week Bedtime    NON FORMULARY Take 25 mg by mouth At Night As Needed. Zzzquill   Past Week    omeprazole (priLOSEC) 20 MG capsule Take 1 capsule by mouth Daily.   Past Week    sodium bicarbonate 650 MG tablet Take 1 tablet by mouth 5 (Five) Times a Day.   Past Week    spironolactone (ALDACTONE) 25 MG tablet Take 1 tablet by mouth Daily.   Past Week    tamsulosin (FLOMAX) 0.4 MG capsule 24 hr capsule Take 1 capsule by mouth Every Night.   Past Week    traMADol (ULTRAM) 50 MG tablet Take 1 tablet by mouth Every 6 (Six) Hours As Needed for Moderate Pain. 20 tablet 0  Past Week    valsartan (DIOVAN) 320 MG tablet Take 1 tablet by mouth Daily.   Past Week    vitamin D (ERGOCALCIFEROL) 1.25 MG (01473 UT) capsule capsule Take 1 capsule by mouth 1 (One) Time Per Week.   Past Week       Medicines:  Current Facility-Administered Medications   Medication Dose Route Frequency Provider Last Rate Last Admin    acetaminophen (TYLENOL) 160 MG/5ML oral solution 650 mg  650 mg Oral Q4H PRN Katherine Camarena APRN        Or    acetaminophen (TYLENOL) suppository 650 mg  650 mg Rectal Q4H PRN Katherine Camarena APRN        acetaminophen (TYLENOL) tablet 1,000 mg  1,000 mg Oral Q6H PRN Katherine Camarena APRN        ALPRAZolam (XANAX) tablet 0.25 mg  0.25 mg Oral Nightly Katherine Camarena APRN        [START ON 4/21/2025] aspirin EC tablet 81 mg  81 mg Oral Daily Katherine Camarena APRN        atorvastatin (LIPITOR) tablet 10 mg  10 mg Oral Daily Katherine Camarena APRN        sennosides-docusate (PERICOLACE) 8.6-50 MG per tablet 2 tablet  2 tablet Oral BID PRN Katherine Camarena APRN        And    polyethylene glycol (MIRALAX) packet 17 g  17 g Oral Daily PRN Katherine Camarena APRN        And    bisacodyl (DULCOLAX) EC tablet 5 mg  5 mg Oral Daily PRN Katherine Camarena APRN        And    bisacodyl (DULCOLAX) suppository 10 mg  10 mg Rectal Daily PRN Katherine Camarena APRN        calcitriol (ROCALTROL) capsule 0.5 mcg  0.5 mcg Oral Daily Katherine Camarena APRN        carvedilol (COREG) tablet 25 mg  25 mg Oral Q12H Katherine Camarena APRN        cetirizine (zyrTEC) tablet 10 mg  10 mg Oral Daily Katherine Camarena APRN        cloNIDine (CATAPRES) tablet 0.3 mg  0.3 mg Oral TID Katherine Camarena APRN        cloNIDine (CATAPRES-TTS) 0.2 MG/24HR patch 1 patch  1 patch Transdermal Weekly Katheirne Camarena APRN        [START ON 4/21/2025] clopidogrel (PLAVIX) tablet 75 mg  75 mg Oral Daily Katherine Camarena, APRN        furosemide (LASIX) tablet 40 mg  40 mg Oral BID Katherine Camarena, APRN         hydrALAZINE (APRESOLINE) tablet 100 mg  100 mg Oral TID Katherine Camarena APRN        isosorbide dinitrate (ISORDIL) tablet 10 mg  10 mg Oral TID - Nitrates Katherine Camarena APRN   10 mg at 04/20/25 1403    [START ON 4/21/2025] levothyroxine (SYNTHROID, LEVOTHROID) tablet 100 mcg  100 mcg Oral Q AM Katherine Camarena APRN        melatonin tablet 10 mg  10 mg Oral Nightly Katherine Camarena APRN        mesalamine (APRISO) 24 hr capsule 1.5 g  1.5 g Oral Daily Katherine Camarena APRN        montelukast (SINGULAIR) tablet 10 mg  10 mg Oral Nightly Katherine Camarena APRN        NIFEdipine XL (PROCARDIA XL) 24 hr tablet 120 mg  120 mg Oral Daily Katherine Camarena APRN        [START ON 4/21/2025] pantoprazole (PROTONIX) EC tablet 40 mg  40 mg Oral Q AM Katherine Camarena APRN        promethazine (PHENERGAN) tablet 12.5 mg  12.5 mg Oral Q6H PRN Katherine Camarena APRN        sodium bicarbonate tablet 650 mg  650 mg Oral 5x Daily Katherine Camarena APRN        sodium zirconium cyclosilicate (LOKELMA) packet 10 g  10 g Oral TID Dinesh Camarena MD        tamsulosin (FLOMAX) 24 hr capsule 0.4 mg  0.4 mg Oral Nightly aKtherine Camarena APRN           Past Medical History:  Past Medical History:   Diagnosis Date    3-vessel CAD 08/11/2020    Allergic rhinitis     Anemia     Anxiety disorder 04/27/2020    Arthritis     Asymmetrical sensorineural hearing loss 06/28/2017    Atherosclerosis of native artery of both lower extremities with intermittent claudication 07/18/2019    Avascular necrosis of femoral head, left 07/11/2020    right hip after surgery    Carotid stenosis     Chronic mucoid otitis media     Chronic rhinitis     COPD (chronic obstructive pulmonary disease)     Coronary artery disease     HEART BYPASS 2004    Crohn's disease of large intestine with other complication 07/30/2020    Chronic diarrhea Colonoscopy July 2020 revealed mild patchy scattered hemosiderin staining with inflammation more so in  rectosigmoid area.  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    Iron deficiency anemia 07/14/2020    Mixed hearing loss of left ear     PAD (peripheral artery disease) 08/11/2020    Perianal abscess     Pernicious anemia 08/17/2020    took shots but never diagnosed with b12 deficiency    Personal history of alcoholism 08/11/2020    quit drinking in 2013    Prostatic hypertrophy 08/11/2020    Sensorineural hearing loss     Sepsis with acute renal failure 09/15/2020    Shortness of breath 05/27/2021    Tinnitus     Ventricular tachycardia, nonsustained 07/14/2020    Weight loss 07/11/2020       Past Surgical History:  Past Surgical History:   Procedure Laterality Date    ARTERY SURGERY  2021    right side on neck    CAROTID ENDARTERECTOMY Right 05/10/2021    Procedure: RIGHT CAROTID ENDARTERECTOMY WITH EEG;  Surgeon: Gil Pineda DO;  Location: [facility] OR ;  Service: Vascular;  Laterality: Right;    COLONOSCOPY N/A 07/02/2020    Procedure: COLONOSCOPY WITH ANESTHESIA;  Surgeon: Adrien Brewster MD;  Location: [facility] ENDOSCOPY;  Service: Gastroenterology;  Laterality: N/A;  pre op: diarrhea  post op: polyps  PCP: Joe Velasco MD    COLONOSCOPY N/A 10/13/2020    Procedure: COLONOSCOPY WITH ANESTHESIA;  Surgeon: Adrien Brewster MD;  Location: [facility] ENDOSCOPY;  Service: Gastroenterology;  Laterality: N/A;  Pre: Chronic Diarrhea, Crohn's  Post: AVM  Dr. Neftali Velasco  CO2 Inflation Used    COLONOSCOPY N/A 12/08/2023    Procedure: COLONOSCOPY WITH ANESTHESIA;  Surgeon: Adrien Brewster MD;  Location: [facility] ENDOSCOPY;  Service:  Gastroenterology;  Laterality: N/A;  pre chrone's disease  post sub optimal prep, polyp, chrone's      CORONARY ARTERY BYPASS GRAFT  2003    x3    ENDOSCOPY N/A 11/02/2021    Procedure: ESOPHAGOGASTRODUODENOSCOPY WITH ANESTHESIA;  Surgeon: Bridger Bell MD;  Location: [facility] ENDOSCOPY;  Service: Gastroenterology;  Laterality: N/A;  pre anemia;gi bleed  post  gi bleed;schatski ring  Dr. ERIC Velasco    ENDOSCOPY N/A 10/10/2023    Procedure: ESOPHAGOGASTRODUODENOSCOPY WITH ANESTHESIA;  Surgeon: Adrien Brewster MD;  Location: [facility] ENDOSCOPY;  Service: Gastroenterology;  Laterality: N/A;  preop; anemia  postop esophagitis ; R/O barretts   PCP Randall Beata    EYE SURGERY Bilateral     catorac    INCISION AND DRAINAGE PERIRECTAL ABSCESS N/A 03/03/2017    Procedure: INCISION AND DRAINAGE OF JEET ANAL ABSCESS;  Surgeon: Lynette Smith MD;  Location: [facility] OR;  Service:     INGUINAL HERNIA REPAIR Bilateral 06/27/2023    Procedure: INGUINAL HERNIA BILATERAL REPAIR LAPAROSCOPIC WITH DAVINCI ROBOT WITH MESH;  Surgeon: Tahira Rivera MD;  Location: [facility] OR;  Service: Robotics - DaVinci;  Laterality: Bilateral;    INSERTION HEMODIALYSIS CATHETER N/A 4/16/2025    Procedure: HEMODIALYSIS CATHETER PLACEMENT;  Surgeon: Gil Pineda DO;  Location: [facility] HYBRID OR;  Service: Vascular;  Laterality: N/A;    MYRINGOTOMY W/ TUBES Left 04/17/2017    06/10/2016    TONSILLECTOMY      TOTAL HIP ARTHROPLASTY Right 2006       Family History  Family History   Problem Relation Age of Onset    Breast cancer Mother     Dementia Father     Glaucoma Father     No Known Problems Daughter     Colon polyps Neg Hx     Colon cancer Neg Hx        Social History  Social History     Socioeconomic History    Marital status:    Tobacco Use    Smoking status: Former     Current packs/day: 0.00     Average packs/day: 0.5 packs/day for 25.8 years (12.9 ttl pk-yrs)     Types: Cigarettes     Start date: 1988     Quit  "date: 10/13/2013     Years since quittin.5     Passive exposure: Past    Smokeless tobacco: Never    Tobacco comments:     quit 2013   Vaping Use    Vaping status: Former    Substances: Nicotine    Devices: Pre-filled or refillable cartridge   Substance and Sexual Activity    Alcohol use: Not Currently    Drug use: No    Sexual activity: Not Currently     Partners: Female         Review of Systems:  History obtained from chart review and the patient  General ROS: Reports profound fatigue, lack of energy  Respiratory ROS: No cough, shortness of breath, wheezing  Cardiovascular ROS: no chest pain or dyspnea on exertion  Gastrointestinal ROS: No abdominal pain or melena  Genito-Urinary ROS: No dysuria or hematuria  14 point ROS reviewed with the patient and negative except as noted above and in the HPI unless unable to obtain.    Objective:  /52 (BP Location: Right arm, Patient Position: Lying)   Pulse 56   Temp 97.9 °F (36.6 °C) (Oral)   Resp 16   Ht 182.9 cm (72\")   Wt 60.3 kg (133 lb)   SpO2 97%   BMI 18.04 kg/m²   No intake or output data in the 24 hours ending 25 1542  General: awake/alert   HEENT: Normocephalic atraumatic head  Neck: Supple with no JVD or carotid bruits.  Chest:  clear to auscultation bilaterally without respiratory distress  CVS: regular rate and rhythm  Abdominal: soft, nontender, normal bowel sounds  Extremities: no cyanosis or edema  Skin: warm and dry without rash      Labs:    Results from last 7 days   Lab Units 25  0849 25   WBC 10*3/mm3 5.29 8.97   HEMOGLOBIN g/dL 6.6* 10.2*   HEMATOCRIT % 22.0* 32.9*   PLATELETS 10*3/mm3 82* 189       Results from last 7 days   Lab Units 25  1400 25  0849 25   SODIUM mmol/L  --  136 140   POTASSIUM mmol/L  --  6.4* 5.0   CHLORIDE mmol/L  --  102 102   CO2 mmol/L  --  16.0* 16.0*   BUN mg/dL  --  93* 81*   CREATININE mg/dL  --  4.50* 5.10*   CALCIUM mg/dL  --  8.6 9.4   BILIRUBIN mg/dL 0.2 "  --  0.2   ALK PHOS U/L 176*  --  173*   ALT (SGPT) U/L 10  --  14   AST (SGOT) U/L 17  --  17   GLUCOSE mg/dL  --  87 104*   EGFR mL/min/1.73  --  12.8* 11.0*         Radiology:   Imaging Results (Last 24 Hours)       Procedure Component Value Units Date/Time    XR Chest 1 View [[id number]] Collected: 04/20/25 1155     Updated: 04/20/25 1159    Narrative:      XR CHEST 1 VW- 4/20/2025 10:40 AM     HISTORY: Preop; N18.9-Chronic kidney disease, unspecified;  E87.5-Hyperkalemia; D64.9-Anemia, unspecified; M54.16-Radiculopathy,  lumbar region; I73.9-Peripheral vascular disease, unspecified       COMPARISON: Chest x-ray dated 4/13/2025     FINDINGS:  Upright frontal radiograph of the chest was obtained     Bilateral interstitial coarsening with subpleural lines reflecting  interstitial edema. Trace left-sided and small to moderate sided right  pleural effusions. No consolidation or pneumothorax. Prior coronary  bypass. Right IJ dual-lumen central venous catheter. No acute bony  abnormality.       Impression:      1.  Volume overload with interstitial edema and bilateral pleural  effusions as detailed above.     This report was signed and finalized on 4/20/2025 11:56 AM by Dr Tyrell Knox.       US Arterial Doppler Lower Extremity Complete [[id number]] Resulted: 04/20/25 0953     Updated: 04/20/25 1029    CT Lumbar Spine Without Contrast [[id number]] Collected: 04/20/25 0947     Updated: 04/20/25 0953    Narrative:      CT LUMBAR SPINE WO CONTRAST- 4/20/2025 8:24 AM     HISTORY: Right-sided lumbar radiculopathy     COMPARISON: None      DOSE LENGTH PRODUCT: 439.68 mGy.cm. Automated exposure control was also  utilized to decrease patient radiation dose.     TECHNIQUE: Serial helical tomographic images of the lumbar spine were  obtained without the use of intravenous contrast. Additionally, sagittal  and coronal reformatted images were provided for review. Automated  exposure control is utilized to reduce patient radiation  "dose.     FINDINGS:     Counting will assume 5 lumbar-type vertebrae. The spine is imaged from  T12 to S3.     There is no visualized acute fracture or traumatic subluxation. Lumbar  lordosis is maintained. Vertebral body heights are well maintained.  Multilevel loss of intervertebral disc height, L5-S1 in particular. No  high-grade bony narrowing of the spinal canal. Advanced facet  arthropathy. Posterior elements are intact. Neuroforaminal narrowing at  multiple levels, especially L5-S1 on the right. There is a layering  right-sided pleural effusion. Atheromatous vascular calcification along  the abdominal aorta. Incidentally noted sacral canal Tarlov cysts.  Included portion of the bony pelvis is intact.       Impression:      1. No acute osseous injury or malalignment.   2. Underlying advanced lumbar spine osteoarthritis changes including a  high-grade right-sided neuroforaminal narrowing at L5-S1.  3. Layering right-sided pleural effusion.           This report was signed and finalized on 4/20/2025 9:50 AM by Dr Tyrell Knox.               Culture:  No components found for: \"WOUNDCUL\", \"3\"  No components found for: \"CSFCUL\", \"3\"  No components found for: \"BC\", \"3\"  No components found for: \"URINECUL\", \"3\"      Assessment   1.  Stage V CKD/ESRD.  2.  Hypertensive nephrosclerosis.  3.  Uremic manifestation, already had dialysis access.  4.  Anemia of chronic kidney disease, receiving blood transfusion.  5.  Secondary hyperparathyroidism.  6.  Recurrent hyperkalemia  7.  Metabolic acidemia.  8.  Thrombocytopenia new onset.    Plan:  1.  Agree with blood transfusion.  2.  Initiate dialysis tomorrow/heparin free.  3.  Baseline iron study/initiate ABILIO.  4.  Serum PTH level.  5.  Initiate antihypertensive medication.  6.  Give Lokelma tonight.  7.  Plan was discussed with the patient and his wife at the bedside.      Thank you for the consult, we appreciate the opportunity to provide care to your patients.  Feel " free to contact me if I can be of any further assistance.      Giuliano Saldana MD  4/20/2025  15:42 CDT

## 2025-04-20 NOTE — H&P
[facility] Medicine Services  HISTORY AND PHYSICAL    Date of Admission: 4/20/2025  Primary Care Physician: Nathan Zimmer MD    Subjective   Primary Historian: Patient and daughter    Chief Complaint: Severe right lower extremity pain    History of Present Illness  Ricardo Hugo is a 75-year-old male with a past medical history of coronary artery disease/carotid disease/peripheral vascular disease, COPD, iron deficiency anemia, anxiety disorder, Crohn's disease, GERD, hypertension, please see below for complete list.  Patient reports having recent, 4/16/2025, permcath placement in preparation for hemodialysis due to stage V/end-stage renal failure.  Patient presents to [facility] ED with complaints of severe right leg pain with radiculopathy.  He does have a history of lumbar sciatic pain.  Etiology has not been determined however he does have significant vascular disease, he denies claudication.  He has no shortness of breathing or chest pain.  He denies fatigue.  Patient states leg pain has resolved with medication he took at home and then what ever he was treated with here.  We reviewed labs and rationale for admission.  Patient and daughter verbalized understanding, currently they have no complaints.  They report Dr. Camarena has been in the room and has been quite thorough and educating them on diagnoses and plan.  They are agreeable.    Daughter did suddenly tell me he has taken 12 Excedrin over the last 24 hours for pain.  He did ask for pain medication and wanted tramadol however I explained this is hard on the kidneys therefore we will have to treat with high-dose Tylenol due to his reaction to opioids in the past     ED workup revealed potassium 6.4, CO2 16, BUN 93, creatinine 4.5, GFR 12.8, CRP 2.0, hemoglobin 6.6, hematocrit 22, platelets 82,000, .4, white count within normal limits.  Chest x-ray revealed Bilateral interstitial edema. Trace left-sided and  small to moderate sided right pleural effusions, CT lumbar spine reveals underlying advanced lumbar spine osteoarthritis changes including a high-grade right-sided neuroforaminal narrowing at L5-S1.      Ultrasound arterial Dopplers completed today  Call back B/L GHANSHYAM prelim. Diminished flow throughout entire B/L lower extremities. Technical limitations due to heavy plaque burden throughout entire B/L lower extremities. Rt SFA appears occluded proximally, with flow reconstituting at mid SFA. Patency of Rt distal vessels difficult to determine due to extensive plaque. Lt mid SFA appears occluded, with flow reconstituting at distal SFA. Final report pending.     Results for orders placed during the hospital encounter of 01/10/25    Adult Transthoracic Echo Complete w/ Color, Spectral and Contrast if Necessary Per Protocol    Interpretation Summary    Left ventricular systolic function is normal. Left ventricular ejection fraction appears to be 56 - 60%.    Left ventricular wall thickness is consistent with mild septal asymmetric hypertrophy.    Left ventricular diastolic function is consistent with (grade II w/high LAP) pseudonormalization.    Normal right ventricular cavity size and systolic function noted.    The left atrial cavity is mild to moderately dilated.    The right atrial cavity is dilated.    Mild aortic valve stenosis is present.    Mild to moderate mitral valve regurgitation is present.    Moderate to severe tricuspid valve regurgitation is present.    Estimated right ventricular systolic pressure from tricuspid regurgitation is markedly elevated (>55 mmHg).     Review of Systems   Otherwise complete ROS reviewed and negative except as mentioned in the HPI.    Past Medical History:   Past Medical History:   Diagnosis Date    3-vessel CAD 08/11/2020    Allergic rhinitis     Anemia     Anxiety disorder 04/27/2020    Arthritis     Asymmetrical sensorineural hearing loss 06/28/2017    Atherosclerosis of  native artery of both lower extremities with intermittent claudication 07/18/2019    Avascular necrosis of femoral head, left 07/11/2020    right hip after surgery    Carotid stenosis     Chronic mucoid otitis media     Chronic rhinitis     COPD (chronic obstructive pulmonary disease)     Coronary artery disease     HEART BYPASS 2004    Crohn's disease of large intestine with other complication 07/30/2020    Chronic diarrhea Colonoscopy July 2020 revealed mild patchy scattered hemosiderin staining with inflammation more so in rectosigmoid area.  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    Iron deficiency anemia 07/14/2020    Mixed hearing loss of left ear     PAD (peripheral artery disease) 08/11/2020    Perianal abscess     Pernicious anemia 08/17/2020    took shots but never diagnosed with b12 deficiency    Personal history of alcoholism 08/11/2020    quit drinking in 2013    Prostatic hypertrophy 08/11/2020    Sensorineural hearing loss     Sepsis with acute renal failure 09/15/2020    Shortness of breath 05/27/2021    Tinnitus     Ventricular tachycardia, nonsustained 07/14/2020    Weight loss 07/11/2020     Past Surgical History:  Past Surgical History:   Procedure Laterality Date    ARTERY SURGERY  2021    right side on neck    CAROTID ENDARTERECTOMY Right 05/10/2021    Procedure: RIGHT CAROTID ENDARTERECTOMY WITH EEG;  Surgeon: Gil Pineda DO;  Location: [facility] OR 12;  Service: Vascular;  Laterality: Right;    COLONOSCOPY N/A 07/02/2020    Procedure: COLONOSCOPY WITH ANESTHESIA;  Surgeon: Adrien Brewster MD;  Location:  [facility]  ENDOSCOPY;  Service: Gastroenterology;  Laterality: N/A;  pre op: diarrhea  post op: polyps  PCP: Joe Velasco MD    COLONOSCOPY N/A 10/13/2020    Procedure: COLONOSCOPY WITH ANESTHESIA;  Surgeon: Adrien Brewster MD;  Location: [facility] ENDOSCOPY;  Service: Gastroenterology;  Laterality: N/A;  Pre: Chronic Diarrhea, Crohn's  Post: AVM  Dr. Neftali Velasco  CO2 Inflation Used    COLONOSCOPY N/A 12/08/2023    Procedure: COLONOSCOPY WITH ANESTHESIA;  Surgeon: Adrien Brewster MD;  Location: [facility] ENDOSCOPY;  Service: Gastroenterology;  Laterality: N/A;  pre chrone's disease  post sub optimal prep, polyp, chrone's      CORONARY ARTERY BYPASS GRAFT  2003    x3    ENDOSCOPY N/A 11/02/2021    Procedure: ESOPHAGOGASTRODUODENOSCOPY WITH ANESTHESIA;  Surgeon: Bridger Bell MD;  Location: [facility] ENDOSCOPY;  Service: Gastroenterology;  Laterality: N/A;  pre anemia;gi bleed  post  gi bleed;schatski ring  Dr. ERIC Velasco    ENDOSCOPY N/A 10/10/2023    Procedure: ESOPHAGOGASTRODUODENOSCOPY WITH ANESTHESIA;  Surgeon: Adrien Brewster MD;  Location: [facility] ENDOSCOPY;  Service: Gastroenterology;  Laterality: N/A;  preop; anemia  postop esophagitis ; R/O barretts   PCP Randall Beata    EYE SURGERY Bilateral     catorac    INCISION AND DRAINAGE PERIRECTAL ABSCESS N/A 03/03/2017    Procedure: INCISION AND DRAINAGE OF JEET ANAL ABSCESS;  Surgeon: Lynette Smith MD;  Location: [facility] OR;  Service:     INGUINAL HERNIA REPAIR Bilateral 06/27/2023    Procedure: INGUINAL HERNIA BILATERAL REPAIR LAPAROSCOPIC WITH DAVINCI ROBOT WITH MESH;  Surgeon: Tahira Rivera MD;  Location: [facility] OR;  Service: Robotics - DaVinci;  Laterality: Bilateral;    INSERTION HEMODIALYSIS CATHETER N/A 4/16/2025    Procedure: HEMODIALYSIS CATHETER PLACEMENT;  Surgeon: Gil Pineda DO;  Location: [facility] HYBRID OR;  Service: Vascular;  Laterality: N/A;    MYRINGOTOMY W/ TUBES Left 04/17/2017    06/10/2016    TONSILLECTOMY      TOTAL  HIP ARTHROPLASTY Right 2006     Social History:  reports that he quit smoking about 11 years ago. His smoking use included cigarettes. He started smoking about 37 years ago. He has a 12.9 pack-year smoking history. He has been exposed to tobacco smoke. He has never used smokeless tobacco. He reports that he does not currently use alcohol. He reports that he does not use drugs.    Family History: family history includes Breast cancer in his mother; Dementia in his father; Glaucoma in his father; No Known Problems in his daughter.       Allergies:  Allergies   Allergen Reactions    Ondansetron Anaphylaxis and GI Intolerance    Zofran [Ondansetron Hcl] Anaphylaxis    Lortab [Hydrocodone-Acetaminophen] Other (See Comments) and Hallucinations     CLOSTROPHOBIC    Allopurinol Other (See Comments)     Pain on right side       Medications:  Prior to Admission medications    Medication Sig Start Date End Date Taking? Authorizing Provider   albuterol sulfate  (90 Base) MCG/ACT inhaler USE 2 INHALATIONS FOUR TIMES A DAY AS NEEDED FOR SHORTNESS OF AIR OR WHEEZING 3/28/25   Ruthie Parra APRN   ALPRAZolam (XANAX) 0.25 MG tablet Take 1 tablet by mouth Every Night.    ProviderTwyla MD   aspirin (aspirin) 81 MG EC tablet Take 1 tablet by mouth Daily.    ProviderTwyla MD   atorvastatin (LIPITOR) 10 MG tablet Take 1 tablet by mouth Daily. 9/4/24   Nathan Zimmer MD   azelastine (ASTELIN) 0.1 % nasal spray Administer 2 sprays into the nostril(s) as directed by provider As Needed for Rhinitis. Use in each nostril as directed    Twyla Guevara MD   calcitriol (ROCALTROL) 0.5 MCG capsule Take 1 capsule by mouth Daily. 1/28/25   Nathan Zimmer MD   carvedilol (COREG) 25 MG tablet Every 12 (Twelve) Hours.    ProviderTwyla MD   cholestyramine light 4 g packet Take 1 packet by mouth Daily. 3/12/25   Adrien Brewster MD   cloNIDine (CATAPRES) 0.3 MG tablet Take 1 tablet by mouth 3  (Three) Times a Day. 8/19/24   Nathan Zimmer MD   cloNIDine (CATAPRES-TTS) 0.2 MG/24HR patch Place 1 patch on the skin as directed by provider 1 (One) Time Per Week. THURSDAYS    Twyla Guevara MD   clopidogrel (PLAVIX) 75 MG tablet Take 1 tablet by mouth Daily.    Twyla Guevara MD   Copper Gluconate (Copper Caps) 2 MG capsule Take 2 mg by mouth Daily.    Twyla Guevara MD   desoximetasone (TOPICORT) 0.25 % cream Apply 1 Application topically to the appropriate area as directed 2 (Two) Times a Day As Needed for Irritation. 2/17/25   Nathan Zimmer MD   docusate sodium (COLACE) 100 MG capsule Take 1 capsule by mouth 3 (Three) Times a Day As Needed for Constipation.    Twyla Guevara MD   Epoetin Anselmo (PROCRIT IJ) Inject 1 dose as directed 1 (One) Time Per Week. Monday    Twyla Guevara MD   fexofenadine (ALLEGRA) 180 MG tablet Take 1 tablet by mouth Daily.    Twyla Guevara MD   fluticasone (FLONASE) 50 MCG/ACT nasal spray USE 2 SPRAYS IN EACH NOSTRIL AS DIRECTED BY PROVIDER DAILY AS NEEDED FOR RHINITIS 3/3/25   JAMA Gomez,    furosemide (Lasix) 20 MG tablet Take 0.5 tablets by mouth Daily. 2/25/25   Nathan Zimmer MD   furosemide (LASIX) 40 MG tablet Take 1 tablet by mouth Daily.  Patient taking differently: Take 1 tablet by mouth 2 (Two) Times a Day. 1/28/25   Nathan Zimmer MD   hydrALAZINE (APRESOLINE) 100 MG tablet Take 1 tablet by mouth 3 (Three) Times a Day. 100mg daily 8/1/23   Nathan Zimmer MD   isosorbide dinitrate (ISORDIL) 10 MG tablet Take 1 tablet by mouth 3 (Three) Times a Day. 1/28/25   Nathan Zimmer MD   levothyroxine (Synthroid) 100 MCG tablet Take 1 tablet by mouth Every Morning. 3/25/25   Nathan Zimmer MD   magnesium chloride ER 64 MG DR tablet Take  by mouth Daily.    Twyla Guevara MD   melatonin 5 MG tablet tablet Take 2 tablets by mouth Every Night.    Paola MD Twyla   mesalamine  "(APRISO) 0.375 g 24 hr capsule Take 4 capsules by mouth Daily. 3/12/25   Adrien Brewster MD   montelukast (SINGULAIR) 10 MG tablet Take 1 tablet by mouth Every Night.    Twyla Guevara MD   Multiple Vitamins-Minerals (PRESERVISION/LUTEIN) capsule Take 1 capsule by mouth 2 (two) times a day.    Twyla Guevara MD   NIFEdipine CC (ADALAT CC) 60 MG 24 hr tablet Take 2 tablets by mouth Daily for 270 days. 2/25/25 11/22/25  Nathan Zimmer MD   NON FORMULARY Take 25 mg by mouth At Night As Needed. Zzzquill    Twyla Guevara MD   omeprazole (priLOSEC) 20 MG capsule Take 1 capsule by mouth Daily.    Twyla Guevara MD   oxymetazoline (AFRIN) 0.05 % nasal spray Administer 2 sprays into the nostril(s) as directed by provider As Needed for Congestion.    Twyla Guevara MD   sodium bicarbonate 650 MG tablet Take 1 tablet by mouth 4 (Four) Times a Day.    Twyla Guevara MD   spironolactone (Aldactone) 25 MG tablet Take 2 tablets by mouth Daily. 2/27/25   Rip Mcguire DO   tamsulosin (FLOMAX) 0.4 MG capsule 24 hr capsule Take 2 capsules by mouth Every Night. 3/11/25   Ruthie Parra APRN   traMADol (ULTRAM) 50 MG tablet Take 1 tablet by mouth Every 6 (Six) Hours As Needed for Moderate Pain. 4/16/25   Gil Pineda DO   valsartan (DIOVAN) 320 MG tablet Take 1 tablet by mouth Daily. 5/17/23   Nathan Zimmer MD   vitamin D (ERGOCALCIFEROL) 1.25 MG (14000 UT) capsule capsule Take 1 capsule by mouth 1 (One) Time Per Week.    Twyla Guevara MD     I have utilized all available immediate resources to obtain, update, or review the patient's current medications (including all prescriptions, over-the-counter products, herbals, cannabis/cannabidiol products, and vitamin/mineral/dietary (nutritional) supplements).    Objective     Vital Signs: /64   Pulse 54   Temp 98 °F (36.7 °C) (Oral)   Resp 16   Ht 182.9 cm (72\")   Wt 60.3 kg (133 lb)   SpO2 " 95%   BMI 18.04 kg/m²   Physical Exam  Vitals reviewed.   Constitutional:       Appearance: Normal appearance.      Comments: Thin and frail   HENT:      Head: Normocephalic and atraumatic.      Mouth/Throat:      Mouth: Mucous membranes are moist.      Pharynx: Oropharynx is clear.   Eyes:      Extraocular Movements: Extraocular movements intact.      Conjunctiva/sclera: Conjunctivae normal.   Cardiovascular:      Rate and Rhythm: Regular rhythm. Bradycardia present.      Comments: EKG sinus bradycardia with first-degree AV block.  Pedal pulses palpable, right foot cooler on palpation as compared to left however I did remove sock from left to evaluate there by possibly's altering skin temperature.  Pulmonary:      Effort: Pulmonary effort is normal.      Breath sounds: Normal breath sounds.   Abdominal:      General: There is no distension.      Palpations: Abdomen is soft.   Musculoskeletal:      Cervical back: Normal range of motion and neck supple.      Right lower leg: No edema.      Left lower leg: No edema.   Skin:     General: Skin is warm and dry.   Neurological:      General: No focal deficit present.      Mental Status: He is alert and oriented to person, place, and time.   Psychiatric:         Mood and Affect: Mood normal.         Behavior: Behavior normal.          Results Reviewed:  Lab Results (last 24 hours)       Procedure Component Value Units Date/Time    Iron Profile [[id number]]  (Abnormal) Collected: 04/20/25 1400    Specimen: Blood Updated: 04/20/25 1612     Iron 45 mcg/dL      Iron Saturation (TSAT) 14 %      Transferrin 210 mg/dL      TIBC 313 mcg/dL     Sodium, Urine, Random - Urine, Clean Catch [[id number]] Collected: 04/20/25 1509    Specimen: Urine, Clean Catch Updated: 04/20/25 1548     Sodium, Urine 116 mmol/L     Narrative:      Reference intervals for random urine have not been established.  Clinical usage is dependent upon physician's interpretation in combination with other  laboratory tests.       Protein, Urine, Random - Urine, Clean Catch [[id number]] Collected: 04/20/25 1509    Specimen: Urine, Clean Catch Updated: 04/20/25 1548     Total Protein, Urine 54.1 mg/dL     Narrative:      Reference intervals for random urine have not been established.  Clinical usage is dependent upon physician's interpretation in combination with other laboratory tests.       Osmolality, Urine - Urine, Clean Catch [[id number]]  (Normal) Collected: 04/20/25 1509    Specimen: Urine, Clean Catch Updated: 04/20/25 1540     Osmolality, Urine 367 mOsm/kg     Eosinophil Smear - Urine, Urine, Clean Catch [[id number]] Collected: 04/20/25 1509    Specimen: Urine, Clean Catch Updated: 04/20/25 1524    Creatinine Urine Random (kidney function) GFR component - Urine, Clean Catch [[id number]] Collected: 04/20/25 1509    Specimen: Urine, Clean Catch Updated: 04/20/25 1524    Magnesium [[id number]]  (Normal) Collected: 04/20/25 1400    Specimen: Blood Updated: 04/20/25 1436     Magnesium 2.2 mg/dL     Phosphorus [[id number]]  (Abnormal) Collected: 04/20/25 1400    Specimen: Blood Updated: 04/20/25 1436     Phosphorus 7.2 mg/dL     Hepatic Function Panel [[id number]]  (Abnormal) Collected: 04/20/25 1400    Specimen: Blood Updated: 04/20/25 1436     Total Protein 6.7 g/dL      Albumin 3.7 g/dL      ALT (SGPT) 10 U/L      AST (SGOT) 17 U/L      Alkaline Phosphatase 176 U/L      Total Bilirubin 0.2 mg/dL      Bilirubin, Direct 0.1 mg/dL      Bilirubin, Indirect 0.1 mg/dL     Peripheral Blood Smear [[id number]] Collected: 04/20/25 0849    Specimen: Blood from Arm, Left Updated: 04/20/25 1422    Iron Profile [[id number]]  (Abnormal) Collected: 04/20/25 0849    Specimen: Blood from Arm, Left Updated: 04/20/25 1350     Iron 53 mcg/dL      Iron Saturation (TSAT) 18 %      Transferrin 202 mg/dL      TIBC 301 mcg/dL     Ferritin [[id number]]  (Normal) Collected: 04/20/25 0849    Specimen: Blood from Arm, Left Updated: 04/20/25 6385      Ferritin 347.60 ng/mL     Narrative:      Results may be falsely decreased if patient taking Biotin.      Reticulocytes [[id number]]  (Abnormal) Collected: 04/20/25 0849    Specimen: Blood from Arm, Left Updated: 04/20/25 1327     Reticulocyte % 3.84 %      Reticulocyte Absolute 0.0776 10*6/mm3     POC Glucose Once [[id number]]  (Normal) Collected: 04/20/25 1051    Specimen: Blood Updated: 04/20/25 1102     Glucose 105 mg/dL      Comment: : [id number] Kristal ColinMeter ID: [id number]       CBC & Differential [[id number]]  (Abnormal) Collected: 04/20/25 0849    Specimen: Blood from Arm, Left Updated: 04/20/25 0953    Narrative:      The following orders were created for panel order CBC & Differential.  Procedure                               Abnormality         Status                     ---------                               -----------         ------                     CBC Auto Differential[[id number]]        Abnormal            Final result                 Please view results for these tests on the individual orders.    CBC Auto Differential [[id number]]  (Abnormal) Collected: 04/20/25 0849    Specimen: Blood from Arm, Left Updated: 04/20/25 0953     WBC 5.29 10*3/mm3      RBC 2.03 10*6/mm3      Hemoglobin 6.6 g/dL      Hematocrit 22.0 %      .4 fL      MCH 32.5 pg      MCHC 30.0 g/dL      RDW 18.2 %      RDW-SD 71.5 fl      MPV 10.6 fL      Platelets 82 10*3/mm3     Manual Differential [[id number]]  (Abnormal) Collected: 04/20/25 0849    Specimen: Blood from Arm, Left Updated: 04/20/25 0953     Neutrophil % 76.0 %      Lymphocyte % 12.0 %      Monocyte % 7.0 %      Eosinophil % 5.0 %      Basophil % 0.0 %      Neutrophils Absolute 4.02 10*3/mm3      Lymphocytes Absolute 0.63 10*3/mm3      Monocytes Absolute 0.37 10*3/mm3      Eosinophils Absolute 0.26 10*3/mm3      Basophils Absolute 0.00 10*3/mm3      Anisocytosis Slight/1+     Dacrocytes Mod/2+     Microcytes Slight/1+     Ovalocytes Slight/1+      Poikilocytes Mod/2+     Polychromasia Slight/1+     Hypersegmented Neutrophils Present     Platelet Estimate Decreased    Basic Metabolic Panel [[id number]]  (Abnormal) Collected: 04/20/25 0849    Specimen: Blood from Arm, Left Updated: 04/20/25 0930     Glucose 87 mg/dL      BUN 93 mg/dL      Creatinine 4.50 mg/dL      Sodium 136 mmol/L      Potassium 6.4 mmol/L      Chloride 102 mmol/L      CO2 16.0 mmol/L      Calcium 8.6 mg/dL      BUN/Creatinine Ratio 20.7     Anion Gap 18.0 mmol/L      eGFR 12.8 mL/min/1.73     Narrative:      GFR Categories in Chronic Kidney Disease (CKD)      GFR Category          GFR (mL/min/1.73)    Interpretation  G1                     90 or greater         Normal or high (1)  G2                      60-89                Mild decrease (1)  G3a                   45-59                Mild to moderate decrease  G3b                   30-44                Moderate to severe decrease  G4                    15-29                Severe decrease  G5                    14 or less           Kidney failure          (1)In the absence of evidence of kidney disease, neither GFR category G1 or G2 fulfill the criteria for CKD.    eGFR calculation 2021 CKD-EPI creatinine equation, which does not include race as a factor    C-reactive Protein [[id number]]  (Abnormal) Collected: 04/20/25 0849    Specimen: Blood from Arm, Left Updated: 04/20/25 0921     C-Reactive Protein 2.00 mg/dL           Imaging Results (Last 24 Hours)       Procedure Component Value Units Date/Time    US Renal Limited [[id number]] Resulted: 04/20/25 1647     Updated: 04/20/25 1647    XR Chest 1 View [[id number]] Collected: 04/20/25 1155     Updated: 04/20/25 1159    Narrative:      XR CHEST 1 VW- 4/20/2025 10:40 AM     HISTORY: Preop; N18.9-Chronic kidney disease, unspecified;  E87.5-Hyperkalemia; D64.9-Anemia, unspecified; M54.16-Radiculopathy,  lumbar region; I73.9-Peripheral vascular disease, unspecified       COMPARISON: Chest x-ray  dated 4/13/2025     FINDINGS:  Upright frontal radiograph of the chest was obtained     Bilateral interstitial coarsening with subpleural lines reflecting  interstitial edema. Trace left-sided and small to moderate sided right  pleural effusions. No consolidation or pneumothorax. Prior coronary  bypass. Right IJ dual-lumen central venous catheter. No acute bony  abnormality.       Impression:      1.  Volume overload with interstitial edema and bilateral pleural  effusions as detailed above.     This report was signed and finalized on 4/20/2025 11:56 AM by Dr Tyrell Knox.       US Arterial Doppler Lower Extremity Complete [[id number]] Resulted: 04/20/25 0953     Updated: 04/20/25 1029    CT Lumbar Spine Without Contrast [[id number]] Collected: 04/20/25 0947     Updated: 04/20/25 0953    Narrative:      CT LUMBAR SPINE WO CONTRAST- 4/20/2025 8:24 AM     HISTORY: Right-sided lumbar radiculopathy     COMPARISON: None      DOSE LENGTH PRODUCT: 439.68 mGy.cm. Automated exposure control was also  utilized to decrease patient radiation dose.     TECHNIQUE: Serial helical tomographic images of the lumbar spine were  obtained without the use of intravenous contrast. Additionally, sagittal  and coronal reformatted images were provided for review. Automated  exposure control is utilized to reduce patient radiation dose.     FINDINGS:     Counting will assume 5 lumbar-type vertebrae. The spine is imaged from  T12 to S3.     There is no visualized acute fracture or traumatic subluxation. Lumbar  lordosis is maintained. Vertebral body heights are well maintained.  Multilevel loss of intervertebral disc height, L5-S1 in particular. No  high-grade bony narrowing of the spinal canal. Advanced facet  arthropathy. Posterior elements are intact. Neuroforaminal narrowing at  multiple levels, especially L5-S1 on the right. There is a layering  right-sided pleural effusion. Atheromatous vascular calcification along  the abdominal aorta.  Incidentally noted sacral canal Tarlov cysts.  Included portion of the bony pelvis is intact.       Impression:      1. No acute osseous injury or malalignment.   2. Underlying advanced lumbar spine osteoarthritis changes including a  high-grade right-sided neuroforaminal narrowing at L5-S1.  3. Layering right-sided pleural effusion.           This report was signed and finalized on 4/20/2025 9:50 AM by Dr Tyrell Knox.             Assessment / Plan   Assessment:   Active Hospital Problems    Diagnosis     **Anemia, multifactorial to include chronic kidney disease, iron deficiency and possible bleed     Acute pain of lower extremity, right     Hyperkalemia     CKD (chronic kidney disease) stage 5     Resistant hypertension      Treatment Plan  The patient will be admitted to Dr. Camarena's service here at [facility].     1.  Chronic kidney disease stage V/end-stage renal disease, consult nephrology    2.  Anemia multifactorial; check anemia studies, transfuse 1 unit packed red blood cells, monitor H&H every 8 hours, CBC with differential in a.m.    3.  Hyperkalemia; Lokelma 10 g 3 times daily today per nephrology, plans for dialysis in a.m.    4.  Resistant hypertension: Home medications reviewed and restarted, monitor per protocol    5.  Acute pain right lower extremity; due to tolerance to Lortab will give acetaminophen 1 g IV every 4 hours as needed    All medications reviewed and restarted as appropriate  DVT prophylaxis with SCDs    Medical Decision Making  Number and Complexity of problems: 5  Chronic kidney disease stage V/end-stage renal disease acute on chronic, high complexity, unchanged  Anemia, acute on chronic, high complexity, worsening  Hyperkalemia, acute, high complexity, unchanged   Resistant hypertension, chronic, high complexity, stable  Pain right lower extremity, acute, high complexity, resolved    Differential Diagnosis: None    Conditions and Status        Condition is  unchanged.     [facility] Data  External documents reviewed: No  Cardiac tracing (EKG, telemetry) interpretation: Reviewed  Radiology interpretation: Reviewed  Labs reviewed: Yes  Any tests that were considered but not ordered: No     Decision rules/scores evaluated (example [id number], Wells, etc): No     Discussed with: Patient, daughter and Dr. Camarena     Care Planning  Shared decision making: Patient, daughter and Dr. Camarena  Code status and discussions: DNR/DNI    Disposition  Social Determinants of Health that impact treatment or disposition: No  Estimated length of stay is 2+ days.     I confirmed that the patient's advanced care plan is present, code status is documented, and a surrogate decision maker is listed in the patient's medical record.     The patient's surrogate decision maker is daughter.     The patient was seen and examined by me on 4/20/2025 at 12:38 PM.    Electronically signed by STERLING Grant, 04/20/25, 17:03 CDT.

## 2025-04-20 NOTE — ED NOTES
Pt and daughter state was seen last Sunday here and x-ray was negative. Does have vascular appt after dialysis starts.  Pt  states pain starts at the right thigh and moves to toes, pain is worse in the ankle and toes.

## 2025-04-20 NOTE — ED PROVIDER NOTES
Subjective   History of Present Illness  Patient is a 75-year-old gentleman who came to the ER complaining of right leg pain he is has a history of lumbar sciatic pain and he states this exacerbation same thing there is no history of trauma no history of fall.  Patient got history chronically disease.  Denies any claudications.  No nausea or vomiting.  Came to the ED for evaluation he is supposed to start being evaluate for dialysis.    Leg Pain  Location:  Buttock, leg and foot  Time since incident:  1 week  Injury: no    Buttock location:  R buttock  Leg location:  R leg  Foot location:  R foot  Pain details:     Quality:  Aching, burning, cramping and tingling    Radiates to:  R leg    Severity:  Moderate    Onset quality:  Gradual    Duration:  1 week    Timing:  Intermittent    Progression:  Waxing and waning  Chronicity:  Recurrent  Dislocation: no    Foreign body present:  No foreign bodies  Relieved by:  Immobilization  Worsened by:  Bearing weight and extension  Ineffective treatments:  None tried  Associated symptoms: decreased ROM    Associated symptoms: no back pain, no fatigue, no fever, no itching, no numbness, no swelling and no tingling    Risk factors: no concern for non-accidental trauma        Review of Systems   Constitutional: Negative.  Negative for chills, fatigue and fever.   HENT: Negative.  Negative for congestion.    Respiratory: Negative.  Negative for cough, chest tightness and stridor.    Cardiovascular: Negative.  Negative for chest pain.   Gastrointestinal: Negative.  Negative for abdominal distention, abdominal pain, nausea and vomiting.   Endocrine: Negative.    Genitourinary: Negative.  Negative for difficulty urinating and flank pain.   Musculoskeletal: Negative.  Negative for back pain.   Skin: Negative.  Negative for color change and itching.   Neurological: Negative.  Negative for dizziness and headaches.   All other systems reviewed and are negative.      Past Medical History:    Diagnosis Date    3-vessel CAD 08/11/2020    Allergic rhinitis     Anemia     Anxiety disorder 04/27/2020    Arthritis     Asymmetrical sensorineural hearing loss 06/28/2017    Atherosclerosis of native artery of both lower extremities with intermittent claudication 07/18/2019    Avascular necrosis of femoral head, left 07/11/2020    right hip after surgery    Carotid stenosis     Chronic mucoid otitis media     Chronic rhinitis     COPD (chronic obstructive pulmonary disease)     Coronary artery disease     HEART BYPASS 2004    Crohn's disease of large intestine with other complication 07/30/2020    Chronic diarrhea Colonoscopy July 2020 revealed mild patchy scattered hemosiderin staining with inflammation more so in rectosigmoid area.  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    Iron deficiency anemia 07/14/2020    Mixed hearing loss of left ear     PAD (peripheral artery disease) 08/11/2020    Perianal abscess     Pernicious anemia 08/17/2020    took shots but never diagnosed with b12 deficiency    Personal history of alcoholism 08/11/2020    quit drinking in 2013    Prostatic hypertrophy 08/11/2020    Sensorineural hearing loss     Sepsis with acute renal failure 09/15/2020    Shortness of breath 05/27/2021    Tinnitus     Ventricular tachycardia, nonsustained 07/14/2020    Weight loss 07/11/2020       Allergies   Allergen Reactions    Ondansetron Anaphylaxis and GI Intolerance    Zofran [Ondansetron Hcl] Anaphylaxis    Lortab [Hydrocodone-Acetaminophen] Other (See Comments) and Hallucinations     CLOSTROPHOBIC    Allopurinol Other (See Comments)     Pain on right side       Past  Surgical History:   Procedure Laterality Date    ARTERY SURGERY  2021    right side on neck    CAROTID ENDARTERECTOMY Right 05/10/2021    Procedure: RIGHT CAROTID ENDARTERECTOMY WITH EEG;  Surgeon: Gil Pineda DO;  Location: [facility] HYBRID OR 12;  Service: Vascular;  Laterality: Right;    COLONOSCOPY N/A 07/02/2020    Procedure: COLONOSCOPY WITH ANESTHESIA;  Surgeon: Adrien Brewster MD;  Location: [facility] ENDOSCOPY;  Service: Gastroenterology;  Laterality: N/A;  pre op: diarrhea  post op: polyps  PCP: Joe Velasco MD    COLONOSCOPY N/A 10/13/2020    Procedure: COLONOSCOPY WITH ANESTHESIA;  Surgeon: Adrien Brewster MD;  Location: [facility] ENDOSCOPY;  Service: Gastroenterology;  Laterality: N/A;  Pre: Chronic Diarrhea, Crohn's  Post: AVM  Dr. Neftali Velasco  CO2 Inflation Used    COLONOSCOPY N/A 12/08/2023    Procedure: COLONOSCOPY WITH ANESTHESIA;  Surgeon: Adrien Brewster MD;  Location: [facility] ENDOSCOPY;  Service: Gastroenterology;  Laterality: N/A;  pre chrone's disease  post sub optimal prep, polyp, chrone's      CORONARY ARTERY BYPASS GRAFT  2003    x3    ENDOSCOPY N/A 11/02/2021    Procedure: ESOPHAGOGASTRODUODENOSCOPY WITH ANESTHESIA;  Surgeon: Bridger Bell MD;  Location: [facility] ENDOSCOPY;  Service: Gastroenterology;  Laterality: N/A;  pre anemia;gi bleed  post  gi bleed;schatski ring  Dr. ERIC Velasco    ENDOSCOPY N/A 10/10/2023    Procedure: ESOPHAGOGASTRODUODENOSCOPY WITH ANESTHESIA;  Surgeon: Adrien Brewster MD;  Location: [facility] ENDOSCOPY;  Service: Gastroenterology;  Laterality: N/A;  preop; anemia  postop esophagitis ; R/O barretts   PCP Randall Beata    EYE SURGERY Bilateral     catorac    INCISION AND DRAINAGE PERIRECTAL ABSCESS N/A 03/03/2017    Procedure: INCISION AND DRAINAGE OF JEET ANAL ABSCESS;  Surgeon: Lynette Smith MD;  Location: [facility] OR;  Service:     INGUINAL HERNIA REPAIR Bilateral 06/27/2023    Procedure: INGUINAL HERNIA BILATERAL REPAIR LAPAROSCOPIC WITH  DAVINCI ROBOT WITH MESH;  Surgeon: Tahira Rivera MD;  Location:  [facility] OR;  Service: Robotics - DaVinci;  Laterality: Bilateral;    INSERTION HEMODIALYSIS CATHETER N/A 2025    Procedure: HEMODIALYSIS CATHETER PLACEMENT;  Surgeon: Gil Pineda DO;  Location:  [facility] HYBRID OR;  Service: Vascular;  Laterality: N/A;    MYRINGOTOMY W/ TUBES Left 2017    06/10/2016    TONSILLECTOMY      TOTAL HIP ARTHROPLASTY Right        Family History   Problem Relation Age of Onset    Breast cancer Mother     Dementia Father     Glaucoma Father     No Known Problems Daughter     Colon polyps Neg Hx     Colon cancer Neg Hx        Social History     Socioeconomic History    Marital status:    Tobacco Use    Smoking status: Former     Current packs/day: 0.00     Average packs/day: 0.5 packs/day for 25.8 years (12.9 ttl pk-yrs)     Types: Cigarettes     Start date:      Quit date: 10/13/2013     Years since quittin.5     Passive exposure: Past    Smokeless tobacco: Never    Tobacco comments:     quit    Vaping Use    Vaping status: Never Used   Substance and Sexual Activity    Alcohol use: Not Currently    Drug use: No    Sexual activity: Not Currently     Partners: Female           Objective   Physical Exam  Vitals and nursing note reviewed. Exam conducted with a chaperone present.   Constitutional:       General: He is not in acute distress.     Appearance: Normal appearance. He is not ill-appearing or diaphoretic.   HENT:      Head: Normocephalic and atraumatic.      Nose: Nose normal.      Mouth/Throat:      Mouth: Mucous membranes are moist.      Pharynx: Oropharynx is clear.   Eyes:      General: No visual field deficit or scleral icterus.     Extraocular Movements: Extraocular movements intact.      Conjunctiva/sclera: Conjunctivae normal.      Pupils: Pupils are equal, round, and reactive to light.   Neck:      Vascular: No carotid bruit.   Cardiovascular:      Rate and Rhythm:  Normal rate and regular rhythm.      Pulses: Normal pulses.           Femoral pulses are 2+ on the right side and 2+ on the left side.       Dorsalis pedis pulses are 1+ on the right side and 1+ on the left side.        Posterior tibial pulses are 1+ on the right side and 1+ on the left side.      Heart sounds: No murmur heard.     No friction rub.   Pulmonary:      Effort: Pulmonary effort is normal. No respiratory distress.      Breath sounds: Normal breath sounds. No stridor.   Abdominal:      General: Abdomen is flat. Bowel sounds are normal. There is no distension.      Palpations: There is no mass.      Tenderness: There is no abdominal tenderness.      Comments: No inguinal hernias   Musculoskeletal:         General: No swelling or tenderness. Normal range of motion.      Cervical back: Normal range of motion and neck supple. No rigidity. No muscular tenderness.      Thoracic back: Normal.      Lumbar back: Spasms present. No edema, deformity, signs of trauma, lacerations, tenderness or bony tenderness. Normal range of motion. Negative right straight leg raise test and negative left straight leg raise test. No scoliosis.      Comments: Right lower extremity distal pulses diminished but palpable.  There is no evidence of any gangrene or cyanosis.   Lymphadenopathy:      Cervical: No cervical adenopathy.   Skin:     General: Skin is warm.      Capillary Refill: Capillary refill takes less than 2 seconds.      Coloration: Skin is not jaundiced or pale.      Findings: No bruising or rash.   Neurological:      General: No focal deficit present.      Mental Status: He is alert and oriented to person, place, and time. Mental status is at baseline.      GCS: GCS eye subscore is 4. GCS verbal subscore is 5. GCS motor subscore is 6.      Cranial Nerves: Cranial nerves 2-12 are intact. No cranial nerve deficit, dysarthria or facial asymmetry.      Sensory: Sensation is intact.      Motor: Motor function is intact. No  weakness, abnormal muscle tone or pronator drift.      Deep Tendon Reflexes:      Reflex Scores:       Bicep reflexes are 2+ on the right side and 2+ on the left side.       Patellar reflexes are 2+ on the right side and 2+ on the left side.  Psychiatric:         Attention and Perception: Attention normal.         Mood and Affect: Mood and affect normal.         Speech: Speech normal.         Behavior: Behavior normal.         Procedures           ED Course  ED Course as of 04/20/25 1154   Sun Apr 20, 2025   1125 Prominent T waves unchanged compared to prior EKGs [TS]      ED Course User Index  [TS] Matias Kelley MD                                                       Medical Decision Making  Lumbar pain with sciatic radiculopathy.  Will check Doppler studies to make sure the vessels are intact especially with his history of peripheral vascular disease and chronic kidney disease.  Get a CT of the lumbar spine give pain medications.  Patient given multiple different complaints.  But.  Predominant lumbar radiculopathy for which she was given steroids and CT of the lumbar spine was obtained which is negative for any acute neuroforaminal stenosis or spinal stenosis.  Keeping consideration that the right leg was what was bothering him although it was in distribution of sciatic nerve with his prior history of CKD etc. arterial Doppler studies were obtained all bilateral lower extremities his pulses are faintly palpable in the dorsalis pedis but not in the posterior tibial pulses.  The bilateral Doppler studies show poor perfusion but no evidence of any acute ischemic occlusion and clinically he does not have any evidence of acute ischemic lower extremities there bilaterally hypoperfused and does not any evidence of compartment syndrome this is probably exacerbated with his hemoglobin of 6.  Therefore the patient was transfused 1 unit of blood meanwhile his potassium is elevated EKG shows elevated T waves but which have  been seen in the past also the patient was given sodium bicarbonate and calcium gluconate neb treatments and glucose and insulin for this.  And Lokelma p.o. the patient will be admitted to the medicine service for further evaluation treatment.    Problems Addressed:  Anemia, unspecified type: chronic illness or injury  Chronic kidney disease, unspecified CKD stage: chronic illness or injury  Hyperkalemia: complicated acute illness or injury  Lumbar radiculopathy: complicated acute illness or injury  Peripheral vascular disease: chronic illness or injury    Amount and/or Complexity of Data Reviewed  Labs: ordered.     Details: Labs reviewed  Radiology: ordered.     Details: CT scan of sonograms were obtained and reviewed  ECG/medicine tests: ordered.     Details: No STEMI  Discussion of management or test interpretation with external provider(s): Cussed with the hospitalist NEHA Giordano  OTC drugs.  Prescription drug management.  Decision regarding hospitalization.  Risk Details: Will be admitted to the medicine service        Final diagnoses:   Chronic kidney disease, unspecified CKD stage   Hyperkalemia   Anemia, unspecified type   Lumbar radiculopathy   Peripheral vascular disease       ED Disposition  ED Disposition       ED Disposition   Decision to Admit    Condition   --    Comment   Level of Care: Telemetry [5]   Diagnosis: Anemia [026317]   Admitting Physician: DWAYNE ALEX [[id number]]   Attending Physician: DWAYNE ALEX [[id number]]   Certification: I Certify That Inpatient Hospital Services Are Medically Necessary For Greater Than 2 Midnights                 No follow-up provider specified.       Medication List      No changes were made to your prescriptions during this visit.            Matias Kelley MD  04/20/25 [id number]       Matias Kelley MD  04/20/25 1153       Matias Kelley MD  04/20/25 1154

## 2025-04-20 NOTE — PLAN OF CARE
Patient came to hospital w/co right lower leg pain.  Labs revealed hgb 6.6, 1 unit PRBC given.  K+ 6.4, insulin, calcium gluc, glucose, and Lokema given.  Renal US, Venous doppler, CT lumbar spine.  Occult stool outstanding.  Urine studies sent.  Blood pressures elevated.  1x dose IV hydralazine given.  HD permacath placed last week to start HD.  Dialysis to be initiated tomorrow.     Safety maintained  Problem: Adult Inpatient Plan of Care  Goal: Plan of Care Review  Outcome: Progressing  Goal: Patient-Specific Goal (Individualized)  Outcome: Progressing  Goal: Absence of Hospital-Acquired Illness or Injury  Outcome: Progressing  Intervention: Identify and Manage Fall Risk  Recent Flowsheet Documentation  Taken 4/20/2025 1259 by Linette Hubbard RN  Safety Promotion/Fall Prevention: safety round/check completed  Intervention: Prevent Skin Injury  Recent Flowsheet Documentation  Taken 4/20/2025 1259 by Linette Hubbard, RN  Body Position: position changed independently  Goal: Optimal Comfort and Wellbeing  Outcome: Progressing  Goal: Readiness for Transition of Care  Outcome: Progressing     Problem: Hemodialysis  Goal: Safe, Effective Therapy Delivery  Outcome: Progressing  Goal: Effective Tissue Perfusion  Outcome: Progressing  Goal: Absence of Infection Signs and Symptoms  Outcome: Progressing     Problem: Fall Injury Risk  Goal: Absence of Fall and Fall-Related Injury  Outcome: Progressing  Intervention: Promote Injury-Free Environment  Recent Flowsheet Documentation  Taken 4/20/2025 1259 by Linette Hubbard, RN  Safety Promotion/Fall Prevention: safety round/check completed   Goal Outcome Evaluation:

## 2025-04-21 ENCOUNTER — APPOINTMENT (OUTPATIENT)
Dept: CT IMAGING | Facility: HOSPITAL | Age: 75
DRG: 278 | End: 2025-04-21
Payer: MEDICARE

## 2025-04-21 LAB
ALBUMIN SERPL-MCNC: 3.3 G/DL (ref 3.5–5.2)
ALBUMIN/GLOB SERPL: 1.1 G/DL
ALP SERPL-CCNC: 145 U/L (ref 39–117)
ALT SERPL W P-5'-P-CCNC: 10 U/L (ref 1–41)
ANION GAP SERPL CALCULATED.3IONS-SCNC: 15 MMOL/L (ref 5–15)
ANION GAP SERPL CALCULATED.3IONS-SCNC: 15 MMOL/L (ref 5–15)
ANION GAP SERPL CALCULATED.3IONS-SCNC: 17 MMOL/L (ref 5–15)
AST SERPL-CCNC: 14 U/L (ref 1–40)
BASOPHILS # BLD AUTO: 0.04 10*3/MM3 (ref 0–0.2)
BASOPHILS NFR BLD AUTO: 0.9 % (ref 0–1.5)
BH BB BLOOD EXPIRATION DATE: NORMAL
BH BB BLOOD TYPE BARCODE: 9500
BH BB DISPENSE STATUS: NORMAL
BH BB PRODUCT CODE: NORMAL
BH BB UNIT NUMBER: NORMAL
BILIRUB SERPL-MCNC: 0.2 MG/DL (ref 0–1.2)
BUN SERPL-MCNC: 101 MG/DL (ref 8–23)
BUN SERPL-MCNC: 99 MG/DL (ref 8–23)
BUN SERPL-MCNC: 99 MG/DL (ref 8–23)
BUN/CREAT SERPL: 22.4 (ref 7–25)
BUN/CREAT SERPL: 22.8 (ref 7–25)
BUN/CREAT SERPL: 22.8 (ref 7–25)
CALCIUM SPEC-SCNC: 8.5 MG/DL (ref 8.6–10.5)
CALCIUM SPEC-SCNC: 8.5 MG/DL (ref 8.6–10.5)
CALCIUM SPEC-SCNC: 8.8 MG/DL (ref 8.6–10.5)
CHLORIDE SERPL-SCNC: 105 MMOL/L (ref 98–107)
CHLORIDE SERPL-SCNC: 105 MMOL/L (ref 98–107)
CHLORIDE SERPL-SCNC: 106 MMOL/L (ref 98–107)
CO2 SERPL-SCNC: 16 MMOL/L (ref 22–29)
CO2 SERPL-SCNC: 17 MMOL/L (ref 22–29)
CO2 SERPL-SCNC: 17 MMOL/L (ref 22–29)
CREAT SERPL-MCNC: 4.35 MG/DL (ref 0.76–1.27)
CREAT SERPL-MCNC: 4.35 MG/DL (ref 0.76–1.27)
CREAT SERPL-MCNC: 4.51 MG/DL (ref 0.76–1.27)
CROSSMATCH INTERPRETATION: NORMAL
CYTOLOGIST CVX/VAG CYTO: NORMAL
DEPRECATED RDW RBC AUTO: 79.6 FL (ref 37–54)
EGFRCR SERPLBLD CKD-EPI 2021: 12.7 ML/MIN/1.73
EGFRCR SERPLBLD CKD-EPI 2021: 13.3 ML/MIN/1.73
EGFRCR SERPLBLD CKD-EPI 2021: 13.3 ML/MIN/1.73
EOSINOPHIL # BLD AUTO: 0.06 10*3/MM3 (ref 0–0.4)
EOSINOPHIL NFR BLD AUTO: 1.3 % (ref 0.3–6.2)
EOSINOPHIL SPEC QL MICRO: 0 % EOS/100 CELLS (ref 0–0)
ERYTHROCYTE [DISTWIDTH] IN BLOOD BY AUTOMATED COUNT: 21 % (ref 12.3–15.4)
GLOBULIN UR ELPH-MCNC: 3 GM/DL
GLUCOSE SERPL-MCNC: 107 MG/DL (ref 65–99)
GLUCOSE SERPL-MCNC: 107 MG/DL (ref 65–99)
GLUCOSE SERPL-MCNC: 138 MG/DL (ref 65–99)
HBV SURFACE AB SER RIA-ACNC: NORMAL
HCT VFR BLD AUTO: 27.5 % (ref 37.5–51)
HCT VFR BLD AUTO: 27.6 % (ref 37.5–51)
HEMOCCULT STL QL: NEGATIVE
HGB BLD-MCNC: 8.4 G/DL (ref 13–17.7)
HGB BLD-MCNC: 8.6 G/DL (ref 13–17.7)
IMM GRANULOCYTES # BLD AUTO: 0.04 10*3/MM3 (ref 0–0.05)
IMM GRANULOCYTES NFR BLD AUTO: 0.9 % (ref 0–0.5)
LYMPHOCYTES # BLD AUTO: 0.93 10*3/MM3 (ref 0.7–3.1)
LYMPHOCYTES NFR BLD AUTO: 20.3 % (ref 19.6–45.3)
MCH RBC QN AUTO: 31.8 PG (ref 26.6–33)
MCHC RBC AUTO-ENTMCNC: 30.4 G/DL (ref 31.5–35.7)
MCV RBC AUTO: 104.5 FL (ref 79–97)
MONOCYTES # BLD AUTO: 0.45 10*3/MM3 (ref 0.1–0.9)
MONOCYTES NFR BLD AUTO: 9.8 % (ref 5–12)
NEUTROPHILS NFR BLD AUTO: 3.06 10*3/MM3 (ref 1.7–7)
NEUTROPHILS NFR BLD AUTO: 66.8 % (ref 42.7–76)
NRBC BLD AUTO-RTO: 0 /100 WBC (ref 0–0.2)
PATH INTERP BLD-IMP: NORMAL
PLATELET # BLD AUTO: 96 10*3/MM3 (ref 140–450)
PMV BLD AUTO: 10.6 FL (ref 6–12)
POTASSIUM SERPL-SCNC: 4.9 MMOL/L (ref 3.5–5.2)
POTASSIUM SERPL-SCNC: 4.9 MMOL/L (ref 3.5–5.2)
POTASSIUM SERPL-SCNC: 5.2 MMOL/L (ref 3.5–5.2)
PROT SERPL-MCNC: 6.3 G/DL (ref 6–8.5)
QT INTERVAL: 474 MS
QTC INTERVAL: 453 MS
RBC # BLD AUTO: 2.64 10*6/MM3 (ref 4.14–5.8)
SODIUM SERPL-SCNC: 137 MMOL/L (ref 136–145)
SODIUM SERPL-SCNC: 137 MMOL/L (ref 136–145)
SODIUM SERPL-SCNC: 139 MMOL/L (ref 136–145)
UNIT  ABO: NORMAL
UNIT  RH: NORMAL
WBC NRBC COR # BLD AUTO: 4.58 10*3/MM3 (ref 3.4–10.8)

## 2025-04-21 PROCEDURE — 36415 COLL VENOUS BLD VENIPUNCTURE: CPT | Performed by: INTERNAL MEDICINE

## 2025-04-21 PROCEDURE — 75635 CT ANGIO ABDOMINAL ARTERIES: CPT

## 2025-04-21 PROCEDURE — 99222 1ST HOSP IP/OBS MODERATE 55: CPT | Performed by: SURGERY

## 2025-04-21 PROCEDURE — 85025 COMPLETE CBC W/AUTO DIFF WBC: CPT | Performed by: INTERNAL MEDICINE

## 2025-04-21 PROCEDURE — 80053 COMPREHEN METABOLIC PANEL: CPT | Performed by: INTERNAL MEDICINE

## 2025-04-21 PROCEDURE — 5A1D70Z PERFORMANCE OF URINARY FILTRATION, INTERMITTENT, LESS THAN 6 HOURS PER DAY: ICD-10-PCS | Performed by: INTERNAL MEDICINE

## 2025-04-21 PROCEDURE — 25010000002 HEPARIN (PORCINE) PER 1000 UNITS: Performed by: INTERNAL MEDICINE

## 2025-04-21 PROCEDURE — 82272 OCCULT BLD FECES 1-3 TESTS: CPT | Performed by: NURSE PRACTITIONER

## 2025-04-21 PROCEDURE — 25510000001 IOPAMIDOL PER 1 ML: Performed by: FAMILY MEDICINE

## 2025-04-21 RX ORDER — IOPAMIDOL 755 MG/ML
100 INJECTION, SOLUTION INTRAVASCULAR
Status: COMPLETED | OUTPATIENT
Start: 2025-04-21 | End: 2025-04-21

## 2025-04-21 RX ORDER — HEPARIN SODIUM 1000 [USP'U]/ML
3600 INJECTION, SOLUTION INTRAVENOUS; SUBCUTANEOUS AS NEEDED
Status: DISCONTINUED | OUTPATIENT
Start: 2025-04-21 | End: 2025-04-23

## 2025-04-21 RX ADMIN — ISOSORBIDE DINITRATE 10 MG: 10 TABLET ORAL at 15:46

## 2025-04-21 RX ADMIN — SODIUM BICARBONATE 650 MG: 650 TABLET ORAL at 15:46

## 2025-04-21 RX ADMIN — HEPARIN SODIUM 3600 UNITS: 1000 INJECTION, SOLUTION INTRAVENOUS; SUBCUTANEOUS at 13:20

## 2025-04-21 RX ADMIN — CETIRIZINE HYDROCHLORIDE 10 MG: 10 TABLET, FILM COATED ORAL at 15:46

## 2025-04-21 RX ADMIN — ISOSORBIDE DINITRATE 10 MG: 10 TABLET ORAL at 19:45

## 2025-04-21 RX ADMIN — TAMSULOSIN HYDROCHLORIDE 0.4 MG: 0.4 CAPSULE ORAL at 21:20

## 2025-04-21 RX ADMIN — MESALAMINE 1.5 G: 375 CAPSULE, EXTENDED RELEASE ORAL at 15:46

## 2025-04-21 RX ADMIN — CLONIDINE HYDROCHLORIDE 0.3 MG: 0.1 TABLET ORAL at 15:46

## 2025-04-21 RX ADMIN — CARVEDILOL 25 MG: 25 TABLET, FILM COATED ORAL at 21:21

## 2025-04-21 RX ADMIN — ASPIRIN 81 MG: 81 TABLET, COATED ORAL at 15:46

## 2025-04-21 RX ADMIN — Medication 10 MG: at 21:21

## 2025-04-21 RX ADMIN — PANTOPRAZOLE SODIUM 40 MG: 40 TABLET, DELAYED RELEASE ORAL at 06:10

## 2025-04-21 RX ADMIN — NIFEDIPINE 120 MG: 60 TABLET, FILM COATED, EXTENDED RELEASE ORAL at 15:46

## 2025-04-21 RX ADMIN — LEVOTHYROXINE SODIUM 100 MCG: 100 TABLET ORAL at 06:10

## 2025-04-21 RX ADMIN — FUROSEMIDE 40 MG: 40 TABLET ORAL at 21:21

## 2025-04-21 RX ADMIN — CARVEDILOL 25 MG: 25 TABLET, FILM COATED ORAL at 15:47

## 2025-04-21 RX ADMIN — ATORVASTATIN CALCIUM 10 MG: 10 TABLET, FILM COATED ORAL at 15:46

## 2025-04-21 RX ADMIN — CALCITRIOL CAPSULES 0.25 MCG 0.5 MCG: 0.25 CAPSULE ORAL at 15:47

## 2025-04-21 RX ADMIN — SODIUM BICARBONATE 650 MG: 650 TABLET ORAL at 19:45

## 2025-04-21 RX ADMIN — SODIUM ZIRCONIUM CYCLOSILICATE 10 G: 10 POWDER, FOR SUSPENSION ORAL at 15:47

## 2025-04-21 RX ADMIN — SODIUM BICARBONATE 650 MG: 650 TABLET ORAL at 21:21

## 2025-04-21 RX ADMIN — ALPRAZOLAM 0.25 MG: 0.25 TABLET ORAL at 21:22

## 2025-04-21 RX ADMIN — FUROSEMIDE 40 MG: 40 TABLET ORAL at 15:46

## 2025-04-21 RX ADMIN — MONTELUKAST SODIUM 10 MG: 10 TABLET, COATED ORAL at 21:20

## 2025-04-21 RX ADMIN — CLOPIDOGREL BISULFATE 75 MG: 75 TABLET, FILM COATED ORAL at 15:46

## 2025-04-21 RX ADMIN — SODIUM BICARBONATE 650 MG: 650 TABLET ORAL at 06:10

## 2025-04-21 RX ADMIN — HYDRALAZINE HYDROCHLORIDE 100 MG: 50 TABLET ORAL at 21:20

## 2025-04-21 RX ADMIN — HYDRALAZINE HYDROCHLORIDE 100 MG: 50 TABLET ORAL at 15:46

## 2025-04-21 RX ADMIN — IOPAMIDOL 100 ML: 755 INJECTION, SOLUTION INTRAVENOUS at 14:23

## 2025-04-21 NOTE — CONSULTS
Ricardo Hugo  [id number]  [id number]  384/1  Donal Rodriguez,*  4/20/2025    Chief Complaint   Patient presents with    Leg Pain       HPI: Ricardo Hugo is a 75 y.o. male who presented with PMHx coronary artery disease, carotid disease, PVD, COPD, iron deficiency anemia, anxiety disorder, Crohn's disease, GERD, hypertension.  He recently underwent PermCath placement on 4/16/2025 and was scheduled for hemodialysis due to stage V/end-stage renal failure however he presented to [facility] ED with severe right leg pain with radiculopathy.  He does have a history of lumbar sciatic pain.  He does have complaints of right lower extremity ischemia pain.  He was found to have a hemoglobin of 6.6, potassium 6.4.  Patient received PRBCs.  Bilateral arterial duplex obtained in the ER.  Vascular surgery consulted.    Past Medical History:   Diagnosis Date    3-vessel CAD 08/11/2020    Allergic rhinitis     Anemia     Anxiety disorder 04/27/2020    Arthritis     Asymmetrical sensorineural hearing loss 06/28/2017    Atherosclerosis of native artery of both lower extremities with intermittent claudication 07/18/2019    Avascular necrosis of femoral head, left 07/11/2020    right hip after surgery    Carotid stenosis     Chronic mucoid otitis media     Chronic rhinitis     COPD (chronic obstructive pulmonary disease)     Coronary artery disease     HEART BYPASS 2004    Crohn's disease of large intestine with other complication 07/30/2020    Chronic diarrhea Colonoscopy July 2020 revealed mild patchy scattered hemosiderin staining with inflammation more so in rectosigmoid area.  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    Iron deficiency anemia 07/14/2020    Mixed hearing loss of left ear     PAD (peripheral artery disease) 08/11/2020    Perianal abscess     Pernicious anemia 08/17/2020    took shots but never diagnosed with b12 deficiency    Personal history of alcoholism 08/11/2020    quit drinking in 2013    Prostatic hypertrophy 08/11/2020    Sensorineural hearing loss     Sepsis with acute renal failure 09/15/2020    Shortness of breath 05/27/2021    Tinnitus     Ventricular tachycardia, nonsustained 07/14/2020    Weight loss 07/11/2020       Past Surgical History:   Procedure Laterality Date    ARTERY SURGERY  2021    right side on neck    CAROTID ENDARTERECTOMY Right 05/10/2021    Procedure: RIGHT CAROTID ENDARTERECTOMY WITH EEG;  Surgeon: Gil Pineda DO;  Location: [facility] OR ;  Service: Vascular;  Laterality: Right;    COLONOSCOPY N/A 07/02/2020    Procedure: COLONOSCOPY WITH ANESTHESIA;  Surgeon: Adrien Brewster MD;  Location: [facility] ENDOSCOPY;  Service: Gastroenterology;  Laterality: N/A;  pre op: diarrhea  post op: polyps  PCP: Joe Velasco MD    COLONOSCOPY N/A 10/13/2020    Procedure: COLONOSCOPY WITH ANESTHESIA;  Surgeon: Adrien Brewster MD;  Location: [facility] ENDOSCOPY;  Service: Gastroenterology;  Laterality: N/A;  Pre: Chronic Diarrhea, Crohn's  Post: AVM  Dr. Neftali Velasco  CO2 Inflation Used    COLONOSCOPY N/A 12/08/2023    Procedure: COLONOSCOPY WITH ANESTHESIA;  Surgeon: Adrien Brewster MD;  Location: [facility] ENDOSCOPY;  Service: Gastroenterology;  Laterality: N/A;  pre chrone's disease  post sub optimal prep, polyp, chrone's      CORONARY ARTERY BYPASS GRAFT  2003    x3    ENDOSCOPY N/A 11/02/2021    Procedure: ESOPHAGOGASTRODUODENOSCOPY WITH ANESTHESIA;  Surgeon: Bridger Bell MD;  Location: [facility] ENDOSCOPY;  Service: Gastroenterology;  Laterality: N/A;  pre anemia;gi bleed  post  gi bleed;schatski ring  Dr.  ERIC Velasco    ENDOSCOPY N/A 10/10/2023    Procedure: ESOPHAGOGASTRODUODENOSCOPY WITH ANESTHESIA;  Surgeon: Adrien Brewster MD;  Location: [facility] ENDOSCOPY;  Service: Gastroenterology;  Laterality: N/A;  preop; anemia  postop esophagitis ; R/O barretts   PCP Randall Beata    EYE SURGERY Bilateral     catorac    INCISION AND DRAINAGE PERIRECTAL ABSCESS N/A 2017    Procedure: INCISION AND DRAINAGE OF JEET ANAL ABSCESS;  Surgeon: Lynette Smith MD;  Location:  [facility] OR;  Service:     INGUINAL HERNIA REPAIR Bilateral 2023    Procedure: INGUINAL HERNIA BILATERAL REPAIR LAPAROSCOPIC WITH DAVINCI ROBOT WITH MESH;  Surgeon: Tahira Rivera MD;  Location:  [facility] OR;  Service: Robotics - DaVinci;  Laterality: Bilateral;    INSERTION HEMODIALYSIS CATHETER N/A 2025    Procedure: HEMODIALYSIS CATHETER PLACEMENT;  Surgeon: Gil Pineda DO;  Location: [facility] HYBRID OR;  Service: Vascular;  Laterality: N/A;    MYRINGOTOMY W/ TUBES Left 2017    06/10/2016    TONSILLECTOMY      TOTAL HIP ARTHROPLASTY Right        Family History   Problem Relation Age of Onset    Breast cancer Mother     Dementia Father     Glaucoma Father     No Known Problems Daughter     Colon polyps Neg Hx     Colon cancer Neg Hx        Social History     Socioeconomic History    Marital status:    Tobacco Use    Smoking status: Former     Current packs/day: 0.00     Average packs/day: 0.5 packs/day for 25.8 years (12.9 ttl pk-yrs)     Types: Cigarettes     Start date:      Quit date: 10/13/2013     Years since quittin.5     Passive exposure: Past    Smokeless tobacco: Never    Tobacco comments:     quit    Vaping Use    Vaping status: Former    Substances: Nicotine    Devices: Pre-filled or refillable cartridge   Substance and Sexual Activity    Alcohol use: Not Currently    Drug use: No    Sexual activity: Not Currently     Partners: Female       Allergies   Allergen Reactions    Ondansetron  "Anaphylaxis and GI Intolerance    Zofran [Ondansetron Hcl] Anaphylaxis    Lortab [Hydrocodone-Acetaminophen] Other (See Comments) and Hallucinations     CLOSTROPHOBIC    Allopurinol Other (See Comments)     Pain on right side       Hospital Medications (active)         Dose Frequency Start End    acetaminophen (TYLENOL) 160 MG/5ML oral solution 650 mg 650 mg Every 4 Hours PRN 4/20/2025 --    Admin Instructions: If given for fever, use fever parameter: fever greater than 100.4 °F  Based on patient request - if ordered for moderate or severe pain, provider allows for administration of a medication prescribed for a lower pain scale.    Do not exceed 4 grams of acetaminophen in a 24 hr period. Max dose of 2gm for AST/ALT greater than 120 units/L.    If given for pain, use the following pain scale:   Mild Pain = Pain Score of 1-3, CPOT 1-2  Moderate Pain = Pain Score of 4-6, CPOT 3-4  Severe Pain = Pain Score of 7-10, CPOT 5-8    Route: Oral    Linked Group 1: Placed in \"Or\" Linked Group        acetaminophen (TYLENOL) suppository 650 mg 650 mg Every 4 Hours PRN 4/20/2025 --    Admin Instructions: If given for fever, use fever parameter: fever greater than 100.4 °F  Based on patient request - if ordered for moderate or severe pain, provider allows for administration of a medication prescribed for a lower pain scale.    Do not exceed 4 grams of acetaminophen in a 24 hr period. Max dose of 2gm for AST/ALT greater than 120 units/L.    If given for pain, use the following pain scale:   Mild Pain = Pain Score of 1-3, CPOT 1-2  Moderate Pain = Pain Score of 4-6, CPOT 3-4  Severe Pain = Pain Score of 7-10, CPOT 5-8    Route: Rectal    Linked Group 1: Placed in \"Or\" Linked Group        acetaminophen (TYLENOL) tablet 1,000 mg 1,000 mg Every 6 Hours PRN 4/20/2025 --    Admin Instructions: If given for fever, use fever parameter: fever greater than 100.4 °F  Based on patient request - if ordered for moderate or severe pain, provider " "allows for administration of a medication prescribed for a lower pain scale.    Do not exceed 4 grams of acetaminophen in a 24 hr period. Max dose of 2gm for AST/ALT greater than 120 units/L.    If given for pain, use the following pain scale:   Mild Pain = Pain Score of 1-3, CPOT 1-2  Moderate Pain = Pain Score of 4-6, CPOT 3-4  Severe Pain = Pain Score of 7-10, CPOT 5-8    Route: Oral    ALPRAZolam (XANAX) tablet 0.25 mg 0.25 mg Nightly 4/20/2025 --    Admin Instructions:  Caution: Look alike/sound alike drug alert.   Avoid grapefruit juice    Route: Oral    aspirin EC tablet 81 mg 81 mg Daily 4/21/2025 --    Admin Instructions: Do not crush or chew the capsules or tablets. The drug may not work as designed if the capsule or tablet is crushed or chewed. Swallow whole.  Do not exceed 4 grams of aspirin in a 24 hr period.    If given for pain, use the following pain scale:   Mild Pain = Pain Score of 1-3, CPOT 1-2  Moderate Pain = Pain Score of 4-6, CPOT 3-4  Severe Pain = Pain Score of 7-10, CPOT 5-8    Route: Oral    atorvastatin (LIPITOR) tablet 10 mg 10 mg Daily 4/20/2025 --    Admin Instructions: Avoid grapefruit juice.    Route: Oral    bisacodyl (DULCOLAX) EC tablet 5 mg 5 mg Daily PRN 4/20/2025 --    Admin Instructions: Use if no bowel movement after 12 hours.  Swallow whole. Do not crush, split, or chew tablet.    Route: Oral    Linked Group 2: Placed in \"And\" Linked Group        bisacodyl (DULCOLAX) suppository 10 mg 10 mg Daily PRN 4/20/2025 --    Admin Instructions: Use if no bowel movement after 12 hours.  Hold for diarrhea    Route: Rectal    Linked Group 2: Placed in \"And\" Linked Group        calcitriol (ROCALTROL) capsule 0.5 mcg 0.5 mcg Daily 4/20/2025 --    Route: Oral    carvedilol (COREG) tablet 25 mg 25 mg Every 12 Hours Scheduled 4/20/2025 --    Admin Instructions: Hold for SBP less than 100, DBP less than 60, or heart rate less than 50. If a dose is held, please contact the provider.  Give " with food.    Route: Oral    cetirizine (zyrTEC) tablet 10 mg 10 mg Daily 4/20/2025 --    Route: Oral    cloNIDine (CATAPRES) tablet 0.3 mg 0.3 mg 3 Times Daily 4/20/2025 --    Admin Instructions: Hold for SBP less than 100, DBP less than 60, or heart rate less than 50. If a dose is held, please contact the provider.  Caution: Look alike/sound alike drug alert.    Route: Oral    cloNIDine (CATAPRES-TTS) 0.2 MG/24HR patch 1 patch 1 patch Weekly 4/20/2025 --    Admin Instructions: The peach-colored, rectangular patch with rounded corners contains the active medication.   The ivory-colored adhesive cover should then be applied directly over the patch to ensure the patch does not separate from the skin.   Apply patch to hairless area of intact skin on upper outer arm or chest; rotate patch location.  Caution: Look alike/sound alike drug alert.    Route: Transdermal    clopidogrel (PLAVIX) tablet 75 mg 75 mg Daily 4/21/2025 --    Route: Oral    furosemide (LASIX) tablet 40 mg 40 mg 2 Times Daily 4/20/2025 --    Route: Oral    heparin (porcine) injection 3,600 Units 3,600 Units As Needed 4/21/2025 --    Route: Intravenous    hydrALAZINE (APRESOLINE) tablet 100 mg 100 mg 3 Times Daily 4/20/2025 --    Admin Instructions: Hold for SBP less than 100, DBP less than 60.  Caution: Look alike/sound alike drug alert    Route: Oral    isosorbide dinitrate (ISORDIL) tablet 10 mg 10 mg 3 Times Daily (Nitrates) 4/20/2025 --    Route: Oral    levothyroxine (SYNTHROID, LEVOTHROID) tablet 100 mcg 100 mcg Every Early Morning 4/21/2025 --    Admin Instructions: Take on empty stomach.    Route: Oral    melatonin tablet 10 mg 10 mg Nightly 4/20/2025 --    Route: Oral    mesalamine (APRISO) 24 hr capsule 1.5 g 1.5 g Daily 4/20/2025 --    Route: Oral    montelukast (SINGULAIR) tablet 10 mg 10 mg Nightly 4/20/2025 --    Route: Oral    NIFEdipine XL (PROCARDIA XL) 24 hr tablet 120 mg 120 mg Daily 4/20/2025 --    Admin Instructions: Caution: Look  "alike/sound alike drug alert. Avoid grapefruit juice. Swallow whole. Do not crush, split or chew.    Route: Oral    pantoprazole (PROTONIX) EC tablet 40 mg 40 mg Every Early Morning 4/21/2025 --    Admin Instructions: Swallow whole; do not crush, split, or chew.    Route: Oral    polyethylene glycol (MIRALAX) packet 17 g 17 g Daily PRN 4/20/2025 --    Admin Instructions: Use if no bowel movement after 12 hours. Mix in 6-8 ounces of water.  Use 4-8 ounces of water, tea, or juice for each 17 gram dose.    Route: Oral    Linked Group 2: Placed in \"And\" Linked Group        promethazine (PHENERGAN) tablet 12.5 mg 12.5 mg Every 6 Hours PRN 4/20/2025 --    Admin Instructions: \"If multiple N/V medications ordered, use in the following order: Ondansetron, Prochlorperazine, Promethazine. Use PO unless patient refuses or patient unable to swallow.\"      Route: Oral    sennosides-docusate (PERICOLACE) 8.6-50 MG per tablet 2 tablet 2 tablet 2 Times Daily PRN 4/20/2025 --    Admin Instructions: Start bowel management regimen if patient has not had a bowel movement after 12 hours.    Route: Oral    Linked Group 2: Placed in \"And\" Linked Group        sodium bicarbonate tablet 650 mg 650 mg 5 Times Daily 4/20/2025 --    Route: Oral    sodium zirconium cyclosilicate (LOKELMA) packet 10 g 10 g 3 Times Daily 4/20/2025 4/21/2025    Admin Instructions: Empty entire contents of the packet(s) into a glass with at least 3 tablespoons (45 mL) of water. Stir well and drink immediately; if powder remains in the glass, add water, stir and drink immediately; repeat until no powder remains. Administer other oral medications at least 2 hours before or 2 hours after dose.    Route: Oral    tamsulosin (FLOMAX) 24 hr capsule 0.4 mg 0.4 mg Nightly 4/20/2025 --    Admin Instructions: Do not crush or chew the capsules or tablets. The drug may not work as designed if the capsule or tablet is crushed or chewed. Swallow whole. If patient unable to swallow " "whole, contact pharmacy for alternative.    Route: Oral            Review of Systems   Constitutional: Negative.  Negative for diaphoresis and fever.   HENT: Negative.     Eyes: Negative.    Respiratory: Negative.  Negative for shortness of breath and wheezing.    Cardiovascular: Negative.  Negative for chest pain and leg swelling.        Right lower extremity pain at rest.    Gastrointestinal: Negative.  Negative for abdominal pain.   Endocrine: Negative.    Genitourinary: Negative.    Musculoskeletal:  Positive for gait problem.   Skin: Negative.    Allergic/Immunologic: Negative.    Neurological:  Negative for dizziness and weakness.   Hematological: Negative.    Psychiatric/Behavioral: Negative.         /51 (BP Location: Left arm, Patient Position: Lying)   Pulse 55   Temp 98.4 °F (36.9 °C) (Oral)   Resp 16   Ht 182.9 cm (72\")   Wt 67.5 kg (148 lb 12.8 oz)   SpO2 95%   BMI 20.18 kg/m²        Physical Exam  Vitals and nursing note reviewed.   Constitutional:       General: He is not in acute distress.     Appearance: Normal appearance. He is not diaphoretic.   HENT:      Head: Normocephalic. No right periorbital erythema or left periorbital erythema.      Nose: Nose normal.   Eyes:      General: No scleral icterus.     Pupils: Pupils are equal.   Cardiovascular:      Rate and Rhythm: Normal rate and regular rhythm.      Pulses: Normal pulses.           Carotid pulses are 2+ on the right side and 2+ on the left side.       Femoral pulses are 0 on the left side.       Dorsalis pedis pulses are 0 on the right side and detected w/ Doppler on the left side.        Posterior tibial pulses are detected w/ Doppler on the right side and detected w/ Doppler on the left side.      Heart sounds: Normal heart sounds. No murmur heard.  Pulmonary:      Effort: Pulmonary effort is normal. No respiratory distress.      Breath sounds: Normal breath sounds.   Abdominal:      General: Bowel sounds are normal. There is " no distension.      Palpations: Abdomen is soft.      Tenderness: There is no abdominal tenderness. There is no guarding.   Musculoskeletal:         General: No swelling or tenderness. Normal range of motion.      Cervical back: Normal range of motion and neck supple.      Right lower leg: No edema.      Left lower leg: No edema.   Feet:      Right foot:      Skin integrity: Skin integrity normal.      Left foot:      Skin integrity: Skin integrity normal.   Skin:     General: Skin is warm and dry.      Findings: No erythema or rash.             Comments: Right distal foot dwight and cool   Neurological:      General: No focal deficit present.      Mental Status: He is alert and oriented to person, place, and time. Mental status is at baseline.      Cranial Nerves: No cranial nerve deficit.      Gait: Gait normal.   Psychiatric:         Attention and Perception: Attention normal.         Mood and Affect: Mood normal.         Behavior: Behavior normal.         Thought Content: Thought content normal.         Judgment: Judgment normal.         Laboratory Data:  Results from last 7 days   Lab Units 04/21/25  0850 04/20/25  2348 04/20/25  1732 04/20/25  0849   WBC 10*3/mm3 4.58  --   --  5.29   HEMOGLOBIN g/dL 8.4* 8.6* 8.8* 6.6*   HEMATOCRIT % 27.6* 27.5* 28.6* 22.0*   PLATELETS 10*3/mm3 96*  --   --  82*       Results from last 7 days   Lab Units 04/21/25  0850 04/20/25  2348 04/20/25 1732 04/20/25  1400   SODIUM mmol/L 137  137 139 138  --    POTASSIUM mmol/L 4.9  4.9 5.2 6.1*  --    CHLORIDE mmol/L 105  105 106 103  --    CO2 mmol/L 17.0*  17.0* 16.0* 15.0*  --    BUN mg/dL 99*  99* 101* 99*  --    CREATININE mg/dL 4.35*  4.35* 4.51* 4.40*  --    CALCIUM mg/dL 8.5*  8.5* 8.8 8.9  --    BILIRUBIN mg/dL 0.2  --   --  0.2   ALK PHOS U/L 145*  --   --  176*   ALT (SGPT) U/L 10  --   --  10   AST (SGOT) U/L 14  --   --  17   GLUCOSE mg/dL 107*  107* 138* 119*  --              Diagnostic Data:  Imaging Results  (Last 24 Hours)       Procedure Component Value Units Date/Time    US Renal Bilateral [[id number]] Collected: 04/20/25 1717     Updated: 04/20/25 1722    Narrative:      RENAL ULTRASOUND COMPLETE 4/20/2025 3:47 PM     REASON FOR EXAM: Renal Failure; N18.9-Chronic kidney disease,  unspecified; E87.5-Hyperkalemia; D64.9-Anemia, unspecified;  M54.16-Radiculopathy, lumbar region; I73.9-Peripheral vascular disease,  unspecified.     COMPARISON: None.       TECHNIQUE: Multiple longitudinal and transverse realtime sonographic  images of the kidneys and urinary bladder are obtained.     FINDINGS:     RIGHT KIDNEY: The right kidney measures 9.7 cm in length. The cortical  thickness is normal. There is increased cortical echogenicity. There is  a mid to lower pole cyst measuring 1.7 x 1.4 x 1.6 cm. There is no  hydronephrosis. No nephrolithiasis or abnormal perinephric fluid  collections.     LEFT KIDNEY: The left kidney measures 10.1 cm in length. The cortical  thickness is normal. There is increased cortical echogenicity. There is  a 1 x 0.7 x 0.9 cm cyst in the lower pole. There is no hydronephrosis.  No nephrolithiasis or abnormal perinephric fluid collections.     PELVIS: There is mild thickening of the bladder wall. There is no free  fluid in the pelvis.     ADDITIONAL FINDINGS: The abdominal aorta is normal caliber. There is  atheromatous calcification.       Impression:      1. Increased cortical echogenicity of both kidneys consistent with  medical renal disease. No hydronephrosis.  2. Bilateral renal cysts.  3. Mild wall thickening of the urinary bladder which may be related to  muscular hypertrophy. Acute and chronic inflammation and infection are  also included in the differential. Neoplasm less likely.  4. Atheromatous abdominal aorta.           The images and report are stored on PAC's per institutional and state  guidelines.           This report was signed and finalized on 4/20/2025 5:19 PM by   Mikael Gallegos MD.               Impression:    Anemia, multifactorial to include chronic kidney disease, iron deficiency and possible bleed    Resistant hypertension    CKD (chronic kidney disease) stage 5    Acute pain of lower extremity, right    Hyperkalemia      Plan: After thoroughly evaluating Ricardo Hugo, I believe the best course of action is to obtain CTA abdomen/pelvis due to inability to palpate left femoral pulse.  Right lower extremity angiogram this week depending on results of testing.  Thank you for allowing me to participate in the care of your patient.  Please do not hesitate to call with any questions or concerns.  Melanie Ayala, APRN   Vascular Surgery  [phone]

## 2025-04-21 NOTE — PROGRESS NOTES
Patient Name: Ricardo Hugo  Date of Admission: 4/20/2025  Today's Date: 04/21/25  Length of Stay: 1  Primary Care Physician: Nathan Zimmer MD    Subjective   Chief Complaint: Right lower extremity pain  HPI   Today: Resting in bed about to leave for dialysis.  He reports no pain in right leg at this time but does complain of pain with movement and he rates 8/10 described as sharp and shooting.  Will consult vascular surgery related to arterial Doppler study results.  Patient also reports having numbness in the right leg and foot but is able to feel pain when squeezed.    Discussed case with Dr. Zabala, CT angiogram of the right lower extremity ordered, he will follow along    Documented weights    04/20/25 0750 04/21/25 0501   Weight: 60.3 kg (133 lb) 67.5 kg (148 lb 12.8 oz)          Intake/Output Summary (Last 24 hours) at 4/21/2025 1747  Last data filed at 4/21/2025 1648  Gross per 24 hour   Intake 960 ml   Output 1100 ml   Net -140 ml       Results for orders placed during the hospital encounter of 01/10/25    Adult Transthoracic Echo Complete w/ Color, Spectral and Contrast if Necessary Per Protocol    Interpretation Summary    Left ventricular systolic function is normal. Left ventricular ejection fraction appears to be 56 - 60%.    Left ventricular wall thickness is consistent with mild septal asymmetric hypertrophy.    Left ventricular diastolic function is consistent with (grade II w/high LAP) pseudonormalization.    Normal right ventricular cavity size and systolic function noted.    The left atrial cavity is mild to moderately dilated.    The right atrial cavity is dilated.    Mild aortic valve stenosis is present.    Mild to moderate mitral valve regurgitation is present.    Moderate to severe tricuspid valve regurgitation is present.    Estimated right ventricular systolic pressure from tricuspid regurgitation is markedly elevated (>55 mmHg).       Review of Systems   All pertinent negatives and  positives are as above. All other systems have been reviewed and are negative unless otherwise stated.     Objective    Temp:  [98.1 °F (36.7 °C)-98.4 °F (36.9 °C)] 98.3 °F (36.8 °C)  Heart Rate:  [55-61] 55  Resp:  [16] 16  BP: (145-188)/(40-67) 145/49  Physical Exam  Vitals and nursing note reviewed.   Constitutional:       Appearance: He is ill-appearing (Chronic).   HENT:      Head: Normocephalic and atraumatic.      Mouth/Throat:      Mouth: Mucous membranes are moist.      Pharynx: Oropharynx is clear.   Eyes:      Extraocular Movements: Extraocular movements intact.      Conjunctiva/sclera: Conjunctivae normal.   Cardiovascular:      Rate and Rhythm: Normal rate and regular rhythm.      Heart sounds: Murmur heard.   Pulmonary:      Effort: Pulmonary effort is normal.      Breath sounds: Normal breath sounds.   Abdominal:      General: There is no distension.      Palpations: Abdomen is soft.   Musculoskeletal:      Cervical back: Normal range of motion and neck supple.      Right lower leg: No edema.      Left lower leg: No edema.   Skin:     General: Skin is warm and dry.      Comments: Bilateral lower extremities dusky  Multiple bruises scattered   Neurological:      General: No focal deficit present.      Mental Status: He is alert and oriented to person, place, and time.   Psychiatric:         Mood and Affect: Mood normal.         Behavior: Behavior normal.       Results Review:  I have reviewed the labs, radiology results, and diagnostic studies.    Laboratory Data:   Results from last 7 days   Lab Units 04/21/25  0850 04/20/25  2348 04/20/25 1732 04/20/25  0849   WBC 10*3/mm3 4.58  --   --  5.29   HEMOGLOBIN g/dL 8.4* 8.6* 8.8* 6.6*   HEMATOCRIT % 27.6* 27.5* 28.6* 22.0*   PLATELETS 10*3/mm3 96*  --   --  82*        Results from last 7 days   Lab Units 04/21/25  0850 04/20/25  2348 04/20/25 1732 04/20/25  1400   SODIUM mmol/L 137  137 139 138  --    POTASSIUM mmol/L 4.9  4.9 5.2 6.1*  --    CHLORIDE  mmol/L 105  105 106 103  --    CO2 mmol/L 17.0*  17.0* 16.0* 15.0*  --    BUN mg/dL 99*  99* 101* 99*  --    CREATININE mg/dL 4.35*  4.35* 4.51* 4.40*  --    CALCIUM mg/dL 8.5*  8.5* 8.8 8.9  --    BILIRUBIN mg/dL 0.2  --   --  0.2   ALK PHOS U/L 145*  --   --  176*   ALT (SGPT) U/L 10  --   --  10   AST (SGOT) U/L 14  --   --  17   GLUCOSE mg/dL 107*  107* 138* 119*  --        Culture Data:     Microbiology Results (last 10 days)       Procedure Component Value - Date/Time    Eosinophil Smear - Urine, Urine, Clean Catch [[id number]]  (Normal) Collected: 04/20/25 1509    Lab Status: Final result Specimen: Urine, Clean Catch Updated: 04/21/25 0020     Eosinophil Smear 0 % EOS/100 Cells              Radiology Data:   Imaging Results (Last 7 Days)       Procedure Component Value Units Date/Time    CT Angio Abdominal Aorta Bilateral Iliofem Runoff [[id number]] Collected: 04/21/25 1621     Updated: 04/21/25 1636    Narrative:      EXAM/TECHNIQUE: CT angiography with 3D MIP images abdomen and pelvis  with bilateral lower extremity runoff     INDICATION: aortoiliac disease right lower extremity ischemia;  N18.9-Chronic kidney disease, unspecified; E87.5-Hyperkalemia;  D64.9-Anemia, unspecified; M54.16-Radiculopathy, lumbar region;  I73.9-Peripheral vascular disease, unspecified     COMPARISON: 9/15/2020     DLP: 479.06 mGy.cm. Automated exposure control was utilized to decrease  patient radiation dose.     FINDINGS:     Nonaneurysmal abdominal aorta with heavy atherosclerotic calcification.  Celiac artery SMA, and MIKI are widely patent. Mild atherosclerotic  narrowing of the left main renal artery. There are 2 renal arteries  bilaterally.     The right common iliac and internal iliac arteries are widely patent.  Focal moderate to severe atherosclerotic stenosis of the right external  iliac artery on series 4 image 155. The right common femoral artery is  widely patent and contains heavy atherosclerotic calcification.  Right  profunda is widely patent. Heavy atherosclerotic calcification  throughout the right SFA with multifocal areas of moderate stenosis.  Patent atherosclerotic popliteal artery. Three-vessel right lower  extremity runoff.     The left common iliac and internal iliac artery are widely patent.  Moderate atherosclerotic stenosis of the left external iliac artery on  series 4 image 159. Left common femoral artery is widely patent. Left  profunda is widely patent. Heavy atherosclerotic calcification  throughout the SFA with multifocal areas of proximal stenosis. The left  SFA appears occluded along its mid course (series 4 image 297) with  reconstitution occurring at the distal vessel. After reconstitution,  there is an area of severe left SFA stenosis on series 4 image 359.  Patent left popliteal artery. Three-vessel left lower extremity runoff  appears maintained.     Nonvascular findings:     Moderate right pleural effusion with overlying atelectasis. Trace left  pleural effusion. Cardiomegaly. Liver appears cirrhotic. 9 mm arterial  enhancing lesion in the left liver on image 54. No gallstone or biliary  ductal dilatation. The pancreas, spleen, and adrenal glands are  unremarkable. Multifocal bilateral renal cortical scarring. No  hydronephrosis or large calculi. No focal urinary bladder wall  thickening.      Moderate volume stool throughout the colon. No colonic wall thickening  or pericolonic fat stranding. No small bowel distention or inflammation.     No evidence of ascites. There is diffuse edematous change throughout the  mesentery/peritoneum. No pelvic mass or collection. Prostate is enlarged  measuring 5.3 cm transverse dimension. No pathologically enlarged  abdominal or pelvic lymph nodes.     Diffuse abdominal wall soft tissue edema. Right hip arthroplasty. Left  femoral head avascular necrosis with areas of subchondral collapse  noted.       Impression:         1.  Nonaneurysmal abdominal aorta with  heavy atherosclerotic  calcification throughout.     2.  Focal moderate-severe atherosclerotic stenosis of the right external  iliac artery. Heavy atherosclerotic calcification throughout the right  SFA with multifocal areas of moderate stenosis. Three-vessel right lower  extremity runoff.     3.  Moderate atherosclerotic stenosis of the left external iliac artery.  Heavy atherosclerotic calcification throughout the left SFA with  multifocal moderate stenosis proximally and complete occlusion of the  mid vessel with reconstitution distally. After reconstitution, there is  an area of focal severe left SFA stenosis. Three-vessel left lower  extremity runoff appears maintained.     4.  Moderate right pleural effusion. Cardiomegaly.     5.  Liver appears cirrhotic. 9 mm enhancing lesion in the left liver on  image 54 series 4. Recommend follow-up liver protocol CT or MRI for  further characterization.     6.  Anasarca with diffuse abdominal wall soft tissue edema and extensive  diffuse edematous change throughout the mesentery and peritoneum.     7.  Advanced left femoral head avascular necrosis.           This report was signed and finalized on 4/21/2025 4:33 PM by Dr. Wellington Farley MD.       US Arterial Doppler Lower Extremity Complete [[id number]] Collected: 04/21/25 1419     Updated: 04/21/25 1425    Narrative:      History: PAD     Comments: Grayscale imaging as well as color flow duplex were used to  evaluate bilateral lower extremity arterial systems.     On the right, the peak systolic velocity in the common femoral artery  measured 31.7 cm/s. In the profunda femoris artery measured 50.3 cm/s.  The proximal SFA appears occluded. The mid SFA measured 20.1 cm/s. In  the distal SFA measured 48.6 cm/s. In the popliteal artery measured 25.3  cm/s. In the posterior tibial artery measured 20.7 cm/s. In the anterior  tibial artery measured 35.1 cm/s.     On the left, the peak systolic velocity in the common femoral  artery  measured 103.2 cm/s. In the profunda femoris artery measured 123.3 cm/s.  In the proximal SFA measured 26.5 cm/s. The mid SFA appears occluded. In  the distal SFA measured 27.3 cm/s. In the popliteal artery measured 16.3  cm/s. In the posterior tibial artery measured 55.8 cm/s. In the anterior  tibial artery measured 42.5 cm/s.       Impression:      In the right lower extremity, there is heavy plaque burden  in the common femoral artery with an occluded proximal SFA. There is  reconstitution in the mid SFA but dampened flow throughout the lower  extremity with heavy plaque burden throughout. In the left lower  extremity, there is also heavy plaque burden in the common femoral  artery with an occluded mid SFA. There is reconstitution in the distal  SFA with dampened flow throughout the lower extremity with heavy plaque  burden throughout. Further imaging/evaluation may be warranted.     This report was signed and finalized on 4/21/2025 2:22 PM by Dr. Gil Pineda MD.        Renal Bilateral [[id number]] Collected: 04/20/25 1717     Updated: 04/20/25 1722    Narrative:      RENAL ULTRASOUND COMPLETE 4/20/2025 3:47 PM     REASON FOR EXAM: Renal Failure; N18.9-Chronic kidney disease,  unspecified; E87.5-Hyperkalemia; D64.9-Anemia, unspecified;  M54.16-Radiculopathy, lumbar region; I73.9-Peripheral vascular disease,  unspecified.     COMPARISON: None.       TECHNIQUE: Multiple longitudinal and transverse realtime sonographic  images of the kidneys and urinary bladder are obtained.     FINDINGS:     RIGHT KIDNEY: The right kidney measures 9.7 cm in length. The cortical  thickness is normal. There is increased cortical echogenicity. There is  a mid to lower pole cyst measuring 1.7 x 1.4 x 1.6 cm. There is no  hydronephrosis. No nephrolithiasis or abnormal perinephric fluid  collections.     LEFT KIDNEY: The left kidney measures 10.1 cm in length. The cortical  thickness is normal. There is increased cortical  echogenicity. There is  a 1 x 0.7 x 0.9 cm cyst in the lower pole. There is no hydronephrosis.  No nephrolithiasis or abnormal perinephric fluid collections.     PELVIS: There is mild thickening of the bladder wall. There is no free  fluid in the pelvis.     ADDITIONAL FINDINGS: The abdominal aorta is normal caliber. There is  atheromatous calcification.       Impression:      1. Increased cortical echogenicity of both kidneys consistent with  medical renal disease. No hydronephrosis.  2. Bilateral renal cysts.  3. Mild wall thickening of the urinary bladder which may be related to  muscular hypertrophy. Acute and chronic inflammation and infection are  also included in the differential. Neoplasm less likely.  4. Atheromatous abdominal aorta.           The images and report are stored on PAC's per institutional and state  guidelines.           This report was signed and finalized on 4/20/2025 5:19 PM by Dr. Mikael Gallegos MD.       XR Chest 1 View [[id number]] Collected: 04/20/25 1155     Updated: 04/20/25 1159    Narrative:      XR CHEST 1 VW- 4/20/2025 10:40 AM     HISTORY: Preop; N18.9-Chronic kidney disease, unspecified;  E87.5-Hyperkalemia; D64.9-Anemia, unspecified; M54.16-Radiculopathy,  lumbar region; I73.9-Peripheral vascular disease, unspecified       COMPARISON: Chest x-ray dated 4/13/2025     FINDINGS:  Upright frontal radiograph of the chest was obtained     Bilateral interstitial coarsening with subpleural lines reflecting  interstitial edema. Trace left-sided and small to moderate sided right  pleural effusions. No consolidation or pneumothorax. Prior coronary  bypass. Right IJ dual-lumen central venous catheter. No acute bony  abnormality.       Impression:      1.  Volume overload with interstitial edema and bilateral pleural  effusions as detailed above.     This report was signed and finalized on 4/20/2025 11:56 AM by Dr Tyrell Knox.       CT Lumbar Spine Without Contrast [[id number]] Collected:  04/20/25 0947     Updated: 04/20/25 0953    Narrative:      CT LUMBAR SPINE WO CONTRAST- 4/20/2025 8:24 AM     HISTORY: Right-sided lumbar radiculopathy     COMPARISON: None      DOSE LENGTH PRODUCT: 439.68 mGy.cm. Automated exposure control was also  utilized to decrease patient radiation dose.     TECHNIQUE: Serial helical tomographic images of the lumbar spine were  obtained without the use of intravenous contrast. Additionally, sagittal  and coronal reformatted images were provided for review. Automated  exposure control is utilized to reduce patient radiation dose.     FINDINGS:     Counting will assume 5 lumbar-type vertebrae. The spine is imaged from  T12 to S3.     There is no visualized acute fracture or traumatic subluxation. Lumbar  lordosis is maintained. Vertebral body heights are well maintained.  Multilevel loss of intervertebral disc height, L5-S1 in particular. No  high-grade bony narrowing of the spinal canal. Advanced facet  arthropathy. Posterior elements are intact. Neuroforaminal narrowing at  multiple levels, especially L5-S1 on the right. There is a layering  right-sided pleural effusion. Atheromatous vascular calcification along  the abdominal aorta. Incidentally noted sacral canal Tarlov cysts.  Included portion of the bony pelvis is intact.       Impression:      1. No acute osseous injury or malalignment.   2. Underlying advanced lumbar spine osteoarthritis changes including a  high-grade right-sided neuroforaminal narrowing at L5-S1.  3. Layering right-sided pleural effusion.           This report was signed and finalized on 4/20/2025 9:50 AM by Dr Tyrell Knox.                I have reviewed the patient's current medications.     ALPRAZolam, 0.25 mg, Oral, Nightly  aspirin, 81 mg, Oral, Daily  atorvastatin, 10 mg, Oral, Daily  calcitriol, 0.5 mcg, Oral, Daily  carvedilol, 25 mg, Oral, Q12H  cetirizine, 10 mg, Oral, Daily  cloNIDine, 0.3 mg, Oral, TID  cloNIDine, 1 patch, Transdermal,  Weekly  clopidogrel, 75 mg, Oral, Daily  furosemide, 40 mg, Oral, BID  hydrALAZINE, 100 mg, Oral, TID  isosorbide dinitrate, 10 mg, Oral, TID - Nitrates  levothyroxine, 100 mcg, Oral, Q AM  melatonin, 10 mg, Oral, Nightly  mesalamine, 1.5 g, Oral, Daily  montelukast, 10 mg, Oral, Nightly  NIFEdipine XL, 120 mg, Oral, Daily  pantoprazole, 40 mg, Oral, Q AM  sodium bicarbonate, 650 mg, Oral, 5x Daily  tamsulosin, 0.4 mg, Oral, Nightly       Assessment/Plan   Assessment  Active Hospital Problems    Diagnosis     **Anemia, multifactorial to include chronic kidney disease, iron deficiency and possible bleed     Acute pain of lower extremity, right     Hyperkalemia     CKD (chronic kidney disease) stage 5     Resistant hypertension        Treatment Plan  1.  Chronic kidney disease stage V/end-stage renal disease, nephrology following, patient to have dialysis today     2.  Anemia multifactorial; check anemia studies, transfused 1 unit packed red blood cells yesterday, hemoglobin stable at 8.4 from 6.6 on admit     3.  Hyperkalemia; Lokelma 10 g 3 times daily today per nephrology, dialysis today, potassium improved     4.  Resistant hypertension: Home medications reviewed and restarted, monitor per protocol     5.  Acute pain right lower extremity; due to tolerance to Lortab will give acetaminophen 1 g IV every 4 hours as needed, vascular surgery consult for arterial Doppler of the lower extremities preliminary results of Diminished flow throughout entire B/L lower extremities. Technical limitations due to heavy plaque burden throughout entire B/L lower extremities. Rt SFA appears occluded proximally, with flow reconstituting at mid SFA. Patency of Rt distal vessels difficult to determine due to extensive plaque. Lt mid SFA appears occluded, with flow reconstituting at distal SFA. Final report pending.  Discussed with Dr. Zabala, vascular surgery, following     DVT prophylaxis with SCDs     Medical Decision Making  Number  and Complexity of problems: 5  Chronic kidney disease stage V/end-stage renal disease acute on chronic, high complexity, unchanged  Anemia, acute on chronic, high complexity, worsening  Hyperkalemia, acute, high complexity, unchanged   Resistant hypertension, chronic, high complexity, stable  Pain right lower extremity, acute, high complexity, resolved     Differential Diagnosis: None     Conditions and Status        Condition is unchanged.     MDM Data  External documents reviewed: No  Cardiac tracing (EKG, telemetry) interpretation: Reviewed  Radiology interpretation: Reviewed  Labs reviewed: Yes  Any tests that were considered but not ordered: No     Decision rules/scores evaluated (example [id number], Wells, etc): No     Discussed with: Patient, daughter and Dr. Rodriguez     Care Planning  Shared decision making: Patient, daughter and Dr. Rodriguez  Code status and discussions: DNR/DNI     Disposition  Social Determinants of Health that impact treatment or disposition: No  Estimated length of stay is 2+ days.      I confirmed that the patient's advanced care plan is present, code status is documented, and a surrogate decision maker is listed in the patient's medical record.      The patient's surrogate decision maker is daughter.     Electronically signed by SHELLY Grant, 04/21/25, 17:47 CDT.

## 2025-04-21 NOTE — PLAN OF CARE
Goal Outcome Evaluation:  Plan of Care Reviewed With: patient        Progress: no change  Outcome Evaluation: VSS. Bed check on, safety maintained. Permacath site with constant bloody ooze, drsg changed twice by ICU nurse. To have dialysis today.

## 2025-04-21 NOTE — PLAN OF CARE
Goal Outcome Evaluation:  Plan of Care Reviewed With: patient        Progress: no change  Outcome Evaluation: Initial nutrition assessment secondary to a wound to his coccyx per LDA report. Pt is admitted with multifactorial anemia. He had a permacath placed last week to start HD for ESRD. His first OP appointment is set for 4/25. He had HD today while in-patient. Pt is ordered a renal, low potassium, low phosphorous, low sodium, heart healthy diet. He consumed 100% of dinner last night. No po intake recorded today. He did, however, request double portions today when dining ambassador taking daily meal order. Allowed pt to have double protein and vegetables if requested. Last BM noted on 4/20. He consumed 240mL of oral liquids yesterday per I/O flowsheets with UOP of 1000mL. Unsure the stage of his coccyx wound. Double protein servings will help meet increased needs for skin health. He will receive diet education when he starts OP HD per the RD at the HD center. He is aware of alternate food selections as needed. Dining ambassador will cont to see for meal choices while in the hospital.

## 2025-04-21 NOTE — PROGRESS NOTES
Nephrology ([facility]) Progress Note      Patient:  Ricardo Hugo  YOB: 1950  Date of Service: 4/21/2025  MRN: [id number]   Acct: [id number]   Primary Care Physician: Nathan Zimmer MD  Advance Directive:   Code Status and Medical Interventions: No CPR (Do Not Attempt to Resuscitate); Limited Support; No cardioversion, No intubation (DNI)   Ordered at: 04/20/25 1702     Code Status (Patient has no pulse and is not breathing):    No CPR (Do Not Attempt to Resuscitate)     Medical Interventions (Patient has pulse or is breathing):    Limited Support     Medical Intervention Limits:    No cardioversion       No intubation (DNI)     Level Of Support Discussed With:    Patient     Admit Date: 4/20/2025       Hospital Day: 1  Referring Provider: No Known Provider      Patient personally seen and examined.  Complete chart including Consults, Notes, Operative Reports, Labs, Cardiology, and Radiology studies reviewed as able.        Subjective:  Ricardo Hugo is a 75 y.o. male for whom we were consulted for evaluation and treatment of chronic kidney disease stage 5 with progression to ESRD. Patient has recently been preparing to start hemodialysis. Had permcath placed on 4/16 and was due to start dialysis this week. Presented to ER on 4/20 with severe right lower extremity pain. Labs in ER showed creatinine 4.5 which is similar to his recent baseline. Was found to be significantly anemic with hemoglobin of 6.6. potassium elevated at 6.6. Admitted to medical floor. Received PRBC transfusion.    Today is seen while undergoing his first hemodialysis treatment. Tolerating well with no complaints. No new overnight issues.   Dialysis   Patient was seen and evaluated on renal replacement therapy and I have personally evaluated the patient and directed the therapy  Modality: Hemodialysis  Access: Catheter  Location: right upper  QB: 300  QD: 500  UF:  1500    Allergies:  Ondansetron, Zofran [ondansetron hcl], Lortab [hydrocodone-acetaminophen], and Allopurinol    Home Meds:  Facility-Administered Medications Prior to Admission   Medication Dose Route Frequency Provider Last Rate Last Admin    cyanocobalamin injection 1,000 mcg  1,000 mcg Intramuscular Q28 Days Ruthie Parra APRN   1,000 mcg at 08/11/23 1123     Medications Prior to Admission   Medication Sig Dispense Refill Last Dose/Taking    albuterol sulfate  (90 Base) MCG/ACT inhaler Inhale 2 puffs Every 6 (Six) Hours As Needed for Wheezing or Shortness of Air.   Past Week    ALPRAZolam (XANAX) 0.25 MG tablet Take 1 tablet by mouth Every Night.   Past Week    aspirin (aspirin) 81 MG EC tablet Take 1 tablet by mouth Daily.   Past Week    atorvastatin (LIPITOR) 10 MG tablet Take 1 tablet by mouth Daily. 90 tablet 3 Past Week    calcitriol (ROCALTROL) 0.5 MCG capsule Take 1 capsule by mouth Daily. 90 capsule 2 Past Week    carvedilol (COREG) 25 MG tablet Take 1 tablet by mouth 2 (Two) Times a Day With Meals.   Past Week    cholestyramine light 4 g packet Take 1 packet by mouth Daily. 90 packet 1 Past Week    cloNIDine (CATAPRES) 0.3 MG tablet Take 1 tablet by mouth 3 (Three) Times a Day. 270 tablet 2 Past Week    cloNIDine (CATAPRES-TTS) 0.2 MG/24HR patch Place 1 patch on the skin as directed by provider 1 (One) Time Per Week. THURSDAYS   Past Week    clopidogrel (PLAVIX) 75 MG tablet Take 1 tablet by mouth Daily.   Past Week    Copper Gluconate (Copper Caps) 2 MG capsule Take 2 mg by mouth Daily.   Past Week    docusate sodium (COLACE) 100 MG capsule Take 1 capsule by mouth 3 (Three) Times a Day As Needed for Constipation.   Past Week    Epoetin Anselmo (PROCRIT IJ) Inject 1 dose as directed 1 (One) Time Per Week. Monday   Past Month    fexofenadine (ALLEGRA) 180 MG tablet Take 1 tablet by mouth Daily.   Past Week    fluticasone (FLONASE) 50 MCG/ACT nasal spray Administer 2 sprays into the nostril(s)  as directed by provider Daily.   Past Week    furosemide (Lasix) 20 MG tablet Take 0.5 tablets by mouth Daily.   Past Week    furosemide (LASIX) 40 MG tablet Take 1 tablet by mouth Daily. 90 tablet 2 Past Week    hydrALAZINE (APRESOLINE) 100 MG tablet Take 1 tablet by mouth 3 (Three) Times a Day. 100mg daily   Past Week    isosorbide dinitrate (ISORDIL) 10 MG tablet Take 1 tablet by mouth 3 (Three) Times a Day. 270 tablet 2 Past Week    levothyroxine (Synthroid) 100 MCG tablet Take 1 tablet by mouth Every Morning. 90 tablet 2 Past Week    magnesium chloride ER 64 MG DR tablet Take 1 tablet by mouth Daily.   Past Week    melatonin 5 MG tablet tablet Take 2 tablets by mouth Every Night. (Patient taking differently: Take 3 tablets by mouth Every Night. Patient taking 3 tablets per night)   Past Week    mesalamine (APRISO) 0.375 g 24 hr capsule Take 4 capsules by mouth Daily. 360 capsule 1 Past Week    montelukast (SINGULAIR) 10 MG tablet Take 1 tablet by mouth Every Night.   Past Week    Multiple Vitamins-Minerals (PRESERVISION/LUTEIN) capsule Take 1 capsule by mouth 2 (two) times a day.   Past Week    NIFEdipine CC (ADALAT CC) 60 MG 24 hr tablet Take 2 tablets by mouth Daily for 270 days.   Past Week Bedtime    NON FORMULARY Take 25 mg by mouth At Night As Needed. Zzzquill   Past Week    omeprazole (priLOSEC) 20 MG capsule Take 1 capsule by mouth Daily.   Past Week    sodium bicarbonate 650 MG tablet Take 1 tablet by mouth 5 (Five) Times a Day.   Past Week    spironolactone (ALDACTONE) 25 MG tablet Take 1 tablet by mouth Daily.   Past Week    tamsulosin (FLOMAX) 0.4 MG capsule 24 hr capsule Take 1 capsule by mouth Every Night.   Past Week    traMADol (ULTRAM) 50 MG tablet Take 1 tablet by mouth Every 6 (Six) Hours As Needed for Moderate Pain. 20 tablet 0 Past Week    valsartan (DIOVAN) 320 MG tablet Take 1 tablet by mouth Daily.   Past Week    vitamin D (ERGOCALCIFEROL) 1.25 MG (69254 UT) capsule capsule Take 1  capsule by mouth 1 (One) Time Per Week.   Past Week       Medicines:  Current Facility-Administered Medications   Medication Dose Route Frequency Provider Last Rate Last Admin    acetaminophen (TYLENOL) 160 MG/5ML oral solution 650 mg  650 mg Oral Q4H PRN Katherine Camarena APRN        Or    acetaminophen (TYLENOL) suppository 650 mg  650 mg Rectal Q4H PRN Katherine Camarena, STERLING        acetaminophen (TYLENOL) tablet 1,000 mg  1,000 mg Oral Q6H PRN Katherine Camarena APRN   1,000 mg at 04/20/25 1721    ALPRAZolam (XANAX) tablet 0.25 mg  0.25 mg Oral Nightly Katherine Camarena APRN   0.25 mg at 04/20/25 2225    aspirin EC tablet 81 mg  81 mg Oral Daily Katherine Camarena APRN        atorvastatin (LIPITOR) tablet 10 mg  10 mg Oral Daily Katherine Camarena APRN        sennosides-docusate (PERICOLACE) 8.6-50 MG per tablet 2 tablet  2 tablet Oral BID PRN Katherine Camarena APRN        And    polyethylene glycol (MIRALAX) packet 17 g  17 g Oral Daily PRN Katherine Camarena APRN        And    bisacodyl (DULCOLAX) EC tablet 5 mg  5 mg Oral Daily PRN Katherine Camarena APRN        And    bisacodyl (DULCOLAX) suppository 10 mg  10 mg Rectal Daily PRN Katherine Camarena APRN        calcitriol (ROCALTROL) capsule 0.5 mcg  0.5 mcg Oral Daily Katherine Camarena APRN        carvedilol (COREG) tablet 25 mg  25 mg Oral Q12H Katherine Camarena APRN   25 mg at 04/20/25 2225    cetirizine (zyrTEC) tablet 10 mg  10 mg Oral Daily Katherine Camarena APRN        cloNIDine (CATAPRES) tablet 0.3 mg  0.3 mg Oral TID Katherine Camarena APRN   0.3 mg at 04/20/25 2225    cloNIDine (CATAPRES-TTS) 0.2 MG/24HR patch 1 patch  1 patch Transdermal Weekly Katherine Camarena APRN        clopidogrel (PLAVIX) tablet 75 mg  75 mg Oral Daily Katherine Camarena APRN        furosemide (LASIX) tablet 40 mg  40 mg Oral BID Katherine Camarena APRN   40 mg at 04/20/25 2225    hydrALAZINE (APRESOLINE) tablet 100 mg  100 mg Oral TID Katherine Camarena APRN   100 mg  at 04/20/25 2225    isosorbide dinitrate (ISORDIL) tablet 10 mg  10 mg Oral TID - Nitrates Katherine Camarena, APRN   10 mg at 04/20/25 2231    levothyroxine (SYNTHROID, LEVOTHROID) tablet 100 mcg  100 mcg Oral Q AM Katherine Camarena, APRN   100 mcg at 04/21/25 0610    melatonin tablet 10 mg  10 mg Oral Nightly Katherine Camarena, APRN   10 mg at 04/20/25 2225    mesalamine (APRISO) 24 hr capsule 1.5 g  1.5 g Oral Daily Katherine Camarena APRSHERITA        montelukast (SINGULAIR) tablet 10 mg  10 mg Oral Nightly Katherine Camarena APRN   10 mg at 04/20/25 2225    NIFEdipine XL (PROCARDIA XL) 24 hr tablet 120 mg  120 mg Oral Daily Katherine Camarena APRN   120 mg at 04/20/25 1733    pantoprazole (PROTONIX) EC tablet 40 mg  40 mg Oral Q AM Katherine Camarena APRN   40 mg at 04/21/25 0610    promethazine (PHENERGAN) tablet 12.5 mg  12.5 mg Oral Q6H PRN Katherine Camarena APRN   12.5 mg at 04/20/25 2305    sodium bicarbonate tablet 650 mg  650 mg Oral 5x Daily Katherine Camarena APRN   650 mg at 04/21/25 0610    sodium zirconium cyclosilicate (LOKELMA) packet 10 g  10 g Oral TID Giuliano Saldana MD   10 g at 04/20/25 2225    tamsulosin (FLOMAX) 24 hr capsule 0.4 mg  0.4 mg Oral Nightly Katherine Camarena APRN   0.4 mg at 04/20/25 2225       Past Medical History:  Past Medical History:   Diagnosis Date    3-vessel CAD 08/11/2020    Allergic rhinitis     Anemia     Anxiety disorder 04/27/2020    Arthritis     Asymmetrical sensorineural hearing loss 06/28/2017    Atherosclerosis of native artery of both lower extremities with intermittent claudication 07/18/2019    Avascular necrosis of femoral head, left 07/11/2020    right hip after surgery    Carotid stenosis     Chronic mucoid otitis media     Chronic rhinitis     COPD (chronic obstructive pulmonary disease)     Coronary artery disease     HEART BYPASS 2004    Crohn's disease of large intestine with other complication 07/30/2020    Chronic diarrhea Colonoscopy July 2020 revealed  mild patchy scattered hemosiderin staining with inflammation more so in rectosigmoid area.  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    Iron deficiency anemia 07/14/2020    Mixed hearing loss of left ear     PAD (peripheral artery disease) 08/11/2020    Perianal abscess     Pernicious anemia 08/17/2020    took shots but never diagnosed with b12 deficiency    Personal history of alcoholism 08/11/2020    quit drinking in 2013    Prostatic hypertrophy 08/11/2020    Sensorineural hearing loss     Sepsis with acute renal failure 09/15/2020    Shortness of breath 05/27/2021    Tinnitus     Ventricular tachycardia, nonsustained 07/14/2020    Weight loss 07/11/2020       Past Surgical History:  Past Surgical History:   Procedure Laterality Date    ARTERY SURGERY  2021    right side on neck    CAROTID ENDARTERECTOMY Right 05/10/2021    Procedure: RIGHT CAROTID ENDARTERECTOMY WITH EEG;  Surgeon: Gil Pineda DO;  Location: [facility] OR ;  Service: Vascular;  Laterality: Right;    COLONOSCOPY N/A 07/02/2020    Procedure: COLONOSCOPY WITH ANESTHESIA;  Surgeon: Adrien Brewster MD;  Location: [facility] ENDOSCOPY;  Service: Gastroenterology;  Laterality: N/A;  pre op: diarrhea  post op: polyps  PCP: Joe Velasco MD    COLONOSCOPY N/A 10/13/2020    Procedure: COLONOSCOPY WITH ANESTHESIA;  Surgeon: Adrien Brewster MD;  Location: [facility] ENDOSCOPY;  Service: Gastroenterology;  Laterality: N/A;  Pre: Chronic Diarrhea, Crohn's  Post: AVM  Dr. Neftali Velasco  CO2 Inflation Used    COLONOSCOPY N/A 12/08/2023    Procedure: COLONOSCOPY WITH ANESTHESIA;  Surgeon:  Adrien Brewstre MD;  Location: [facility] ENDOSCOPY;  Service: Gastroenterology;  Laterality: N/A;  pre chrone's disease  post sub optimal prep, polyp, chrone's      CORONARY ARTERY BYPASS GRAFT  2003    x3    ENDOSCOPY N/A 11/02/2021    Procedure: ESOPHAGOGASTRODUODENOSCOPY WITH ANESTHESIA;  Surgeon: Bridger Bell MD;  Location: [facility] ENDOSCOPY;  Service: Gastroenterology;  Laterality: N/A;  pre anemia;gi bleed  post  gi bleed;schatski ring  Dr. ERIC Velasco    ENDOSCOPY N/A 10/10/2023    Procedure: ESOPHAGOGASTRODUODENOSCOPY WITH ANESTHESIA;  Surgeon: Adrien Brewster MD;  Location: [facility] ENDOSCOPY;  Service: Gastroenterology;  Laterality: N/A;  preop; anemia  postop esophagitis ; R/O barretts   PCP Randall Beata    EYE SURGERY Bilateral     catorac    INCISION AND DRAINAGE PERIRECTAL ABSCESS N/A 03/03/2017    Procedure: INCISION AND DRAINAGE OF JEET ANAL ABSCESS;  Surgeon: Lynette Smith MD;  Location: [facility] OR;  Service:     INGUINAL HERNIA REPAIR Bilateral 06/27/2023    Procedure: INGUINAL HERNIA BILATERAL REPAIR LAPAROSCOPIC WITH DAVINCI ROBOT WITH MESH;  Surgeon: Tahira Rivera MD;  Location: [facility] OR;  Service: Robotics - DaVinci;  Laterality: Bilateral;    INSERTION HEMODIALYSIS CATHETER N/A 4/16/2025    Procedure: HEMODIALYSIS CATHETER PLACEMENT;  Surgeon: Gil Pineda DO;  Location: [facility] HYBRID OR;  Service: Vascular;  Laterality: N/A;    MYRINGOTOMY W/ TUBES Left 04/17/2017    06/10/2016    TONSILLECTOMY      TOTAL HIP ARTHROPLASTY Right 2006       Family History  Family History   Problem Relation Age of Onset    Breast cancer Mother     Dementia Father     Glaucoma Father     No Known Problems Daughter     Colon polyps Neg Hx     Colon cancer Neg Hx        Social History  Social History     Socioeconomic History    Marital status:    Tobacco Use    Smoking status: Former     Current packs/day: 0.00     Average packs/day: 0.5 packs/day for 25.8 years (12.9 ttl  pk-yrs)     Types: Cigarettes     Start date:      Quit date: 10/13/2013     Years since quittin.5     Passive exposure: Past    Smokeless tobacco: Never    Tobacco comments:     quit 2013   Vaping Use    Vaping status: Former    Substances: Nicotine    Devices: Pre-filled or refillable cartridge   Substance and Sexual Activity    Alcohol use: Not Currently    Drug use: No    Sexual activity: Not Currently     Partners: Female       Review of Systems:  History obtained from chart review and the patient  General ROS: No fever or chills  Respiratory ROS: No cough, shortness of breath, wheezing  Cardiovascular ROS: No chest pain or palpitations  Gastrointestinal ROS: No abdominal pain or melena  Genito-Urinary ROS: No dysuria or hematuria  Psych ROS: No anxiety and depression  14 point ROS reviewed with the patient and negative except as noted above and in the HPI unless unable to obtain.    Objective:  Patient Vitals for the past 24 hrs:   BP Temp Temp src Pulse Resp SpO2 Weight   25 0854 153/51 98.4 °F (36.9 °C) Oral 55 16 -- --   25 0501 156/40 98.3 °F (36.8 °C) Oral 58 16 95 % 67.5 kg (148 lb 12.8 oz)   25 2359 (!) 188/51 98.2 °F (36.8 °C) Oral 58 16 97 % --   25 2225 -- -- -- 61 -- -- --   25 1921 164/46 98.1 °F (36.7 °C) Oral 58 16 98 % --   25 1551 176/64 98 °F (36.7 °C) Oral 54 16 95 % --   25 1257 167/52 97.9 °F (36.6 °C) Oral 56 16 97 % --   25 1242 162/56 97.8 °F (36.6 °C) Oral 58 16 97 % --   25 1238 167/58 -- -- 61 -- 97 % --   25 1231 164/56 -- -- 56 -- 95 % --   25 1230 164/56 97.9 °F (36.6 °C) Oral 56 16 97 % --   25 1216 152/56 -- -- 57 -- 97 % --   25 1201 177/56 97.9 °F (36.6 °C) -- 55 -- 96 % --   25 1131 166/55 -- -- 57 -- 100 % --   25 1116 165/51 -- -- 55 -- 100 % --   25 1101 159/56 -- -- 52 -- 100 % --       Intake/Output Summary (Last 24 hours) at 2025 1100  Last data filed at  4/21/2025 0501  Gross per 24 hour   Intake 650.42 ml   Output 1000 ml   Net -349.58 ml     General: awake/alert   Chest:  clear to auscultation bilaterally without respiratory distress  CVS: regular rate and rhythm  Abdominal: soft, nontender, positive bowel sounds  Extremities: no cyanosis or edema  Skin: warm and dry without rash      Labs:  Results from last 7 days   Lab Units 04/21/25  0850 04/20/25  2348 04/20/25  1732 04/20/25  0849   WBC 10*3/mm3 4.58  --   --  5.29   HEMOGLOBIN g/dL 8.4* 8.6* 8.8* 6.6*   HEMATOCRIT % 27.6* 27.5* 28.6* 22.0*   PLATELETS 10*3/mm3 96*  --   --  82*         Results from last 7 days   Lab Units 04/21/25  0850 04/20/25  2348 04/20/25 1732 04/20/25  1400   SODIUM mmol/L 137  137 139 138  --    POTASSIUM mmol/L 4.9  4.9 5.2 6.1*  --    CHLORIDE mmol/L 105  105 106 103  --    CO2 mmol/L 17.0*  17.0* 16.0* 15.0*  --    BUN mg/dL 99*  99* 101* 99*  --    CREATININE mg/dL 4.35*  4.35* 4.51* 4.40*  --    CALCIUM mg/dL 8.5*  8.5* 8.8 8.9  --    EGFR mL/min/1.73 13.3*  13.3* 12.7* 13.1*  --    BILIRUBIN mg/dL 0.2  --   --  0.2   ALK PHOS U/L 145*  --   --  176*   ALT (SGPT) U/L 10  --   --  10   AST (SGOT) U/L 14  --   --  17   GLUCOSE mg/dL 107*  107* 138* 119*  --        Radiology:   Imaging Results (Last 72 Hours)       Procedure Component Value Units Date/Time    US Renal Bilateral [[id number]] Collected: 04/20/25 1717     Updated: 04/20/25 1722    Narrative:      RENAL ULTRASOUND COMPLETE 4/20/2025 3:47 PM     REASON FOR EXAM: Renal Failure; N18.9-Chronic kidney disease,  unspecified; E87.5-Hyperkalemia; D64.9-Anemia, unspecified;  M54.16-Radiculopathy, lumbar region; I73.9-Peripheral vascular disease,  unspecified.     COMPARISON: None.       TECHNIQUE: Multiple longitudinal and transverse realtime sonographic  images of the kidneys and urinary bladder are obtained.     FINDINGS:     RIGHT KIDNEY: The right kidney measures 9.7 cm in length. The cortical  thickness is normal.  There is increased cortical echogenicity. There is  a mid to lower pole cyst measuring 1.7 x 1.4 x 1.6 cm. There is no  hydronephrosis. No nephrolithiasis or abnormal perinephric fluid  collections.     LEFT KIDNEY: The left kidney measures 10.1 cm in length. The cortical  thickness is normal. There is increased cortical echogenicity. There is  a 1 x 0.7 x 0.9 cm cyst in the lower pole. There is no hydronephrosis.  No nephrolithiasis or abnormal perinephric fluid collections.     PELVIS: There is mild thickening of the bladder wall. There is no free  fluid in the pelvis.     ADDITIONAL FINDINGS: The abdominal aorta is normal caliber. There is  atheromatous calcification.       Impression:      1. Increased cortical echogenicity of both kidneys consistent with  medical renal disease. No hydronephrosis.  2. Bilateral renal cysts.  3. Mild wall thickening of the urinary bladder which may be related to  muscular hypertrophy. Acute and chronic inflammation and infection are  also included in the differential. Neoplasm less likely.  4. Atheromatous abdominal aorta.           The images and report are stored on PAC's per institutional and state  guidelines.           This report was signed and finalized on 4/20/2025 5:19 PM by Dr. Mikael Gallegos MD.       XR Chest 1 View [[id number]] Collected: 04/20/25 1155     Updated: 04/20/25 1159    Narrative:      XR CHEST 1 VW- 4/20/2025 10:40 AM     HISTORY: Preop; N18.9-Chronic kidney disease, unspecified;  E87.5-Hyperkalemia; D64.9-Anemia, unspecified; M54.16-Radiculopathy,  lumbar region; I73.9-Peripheral vascular disease, unspecified       COMPARISON: Chest x-ray dated 4/13/2025     FINDINGS:  Upright frontal radiograph of the chest was obtained     Bilateral interstitial coarsening with subpleural lines reflecting  interstitial edema. Trace left-sided and small to moderate sided right  pleural effusions. No consolidation or pneumothorax. Prior coronary  bypass. Right IJ  dual-lumen central venous catheter. No acute bony  abnormality.       Impression:      1.  Volume overload with interstitial edema and bilateral pleural  effusions as detailed above.     This report was signed and finalized on 4/20/2025 11:56 AM by Dr Tyrell Knox.       US Arterial Doppler Lower Extremity Complete [[id number]] Resulted: 04/20/25 0953     Updated: 04/20/25 1029    CT Lumbar Spine Without Contrast [[id number]] Collected: 04/20/25 0947     Updated: 04/20/25 0953    Narrative:      CT LUMBAR SPINE WO CONTRAST- 4/20/2025 8:24 AM     HISTORY: Right-sided lumbar radiculopathy     COMPARISON: None      DOSE LENGTH PRODUCT: 439.68 mGy.cm. Automated exposure control was also  utilized to decrease patient radiation dose.     TECHNIQUE: Serial helical tomographic images of the lumbar spine were  obtained without the use of intravenous contrast. Additionally, sagittal  and coronal reformatted images were provided for review. Automated  exposure control is utilized to reduce patient radiation dose.     FINDINGS:     Counting will assume 5 lumbar-type vertebrae. The spine is imaged from  T12 to S3.     There is no visualized acute fracture or traumatic subluxation. Lumbar  lordosis is maintained. Vertebral body heights are well maintained.  Multilevel loss of intervertebral disc height, L5-S1 in particular. No  high-grade bony narrowing of the spinal canal. Advanced facet  arthropathy. Posterior elements are intact. Neuroforaminal narrowing at  multiple levels, especially L5-S1 on the right. There is a layering  right-sided pleural effusion. Atheromatous vascular calcification along  the abdominal aorta. Incidentally noted sacral canal Tarlov cysts.  Included portion of the bony pelvis is intact.       Impression:      1. No acute osseous injury or malalignment.   2. Underlying advanced lumbar spine osteoarthritis changes including a  high-grade right-sided neuroforaminal narrowing at L5-S1.  3. Layering  "right-sided pleural effusion.           This report was signed and finalized on 4/20/2025 9:50 AM by Dr Tyrell Knox.               Culture:  No results found for: \"BLOODCX\", \"URINECX\", \"WOUNDCX\", \"MRSACX\", \"RESPCX\", \"STOOLCX\"      Assessment    Chronic kidney disease with progression to end stage renal disease  Hypertension  Hyperkalemia  Severe anemia--s/p PRBC transfusion on 4/20  Secondary hyperparathyroidism  Metabolic acidosis    Plan:   Dialysis today  Plan for additional HD to be given tomorrow  Will need to verify but patient should already have outpatient HD arrangements in place.  Discussed with Dr Zabala. Vascular has been following patient as an outpatient for his right leg pain. Now that patient has started dialysis, would be Ok from renal standpoint to proceed with CTA of right lower extremity that had been previously considered.      Slava Tate, APRN  4/21/2025  11:00 CDT    "

## 2025-04-21 NOTE — NURSING NOTE
Called due to dialysis catheter continuing to ooze blood and biopatch being saturated.  Dressing changed and surgicel and gauze added to site.  Pt c/o soreness and tenderness at site. Discussed with pt's nurse, Gladis.  Instructed to not change during night unless dressing becomes not intact in order to allow surgicel time to work and to minimize dressing interruptions and skin irritation.  If dressing is not intact due to oozing, then dressing will need to be changed.

## 2025-04-21 NOTE — CASE MANAGEMENT/SOCIAL WORK
Discharge Planning Assessment  [facility]     Patient Name: Ricardo Hugo  MRN: [id number]  Today's Date: 4/21/2025    Admit Date: 4/20/2025        Discharge Needs Assessment       Row Name 04/21/25 1403       Living Environment    People in Home alone    Current Living Arrangements home    Potentially Unsafe Housing Conditions none    Primary Care Provided by self    Provides Primary Care For no one    Family Caregiver if Needed child(jamshid), adult    Quality of Family Relationships helpful;involved;supportive    Able to Return to Prior Arrangements yes       Resource/Environmental Concerns    Resource/Environmental Concerns none       Transition Planning    Patient/Family Anticipates Transition to home    Patient/Family Anticipated Services at Transition outpatient care    Transportation Anticipated family or friend will provide       Discharge Needs Assessment    Readmission Within the Last 30 Days no previous admission in last 30 days    Equipment Currently Used at Home none    Concerns to be Addressed care coordination/care conferences    Outpatient/Agency/Support Group Needs outpatient hemodialysis    Current Discharge Risk lives alone    Discharge Coordination/Progress Pt was in dialysis so spoke with his daughter Kathleen [phone]. Pt lives at home alone and plans to return home at d/c. Pt had a permacath placed last week and arrangements were being made for outpatient dialysis. Daughter is not aware of pt having a chair time so reaching out to Felicita with [employer] to check on. Pt's first HD is today 4/21.                   Discharge Plan    No documentation.                      Demographic Summary    No documentation.                  Functional Status    No documentation.                  Psychosocial    No documentation.                  Abuse/Neglect    No documentation.                  Legal    No documentation.                  Substance Abuse    No documentation.                  Patient Forms     No documentation.                     LINNETTE Ramos

## 2025-04-21 NOTE — CASE MANAGEMENT/SOCIAL WORK
Continued Stay Note  [facility]IN[facility] One[facility]     Patient Name: Ricardo Hugo  MRN: [id number]  Today's Date: 4/21/2025    Admit Date: 4/20/2025    Plan: Home   Discharge Plan       Row Name 04/21/25 1533       Plan    Plan Home    Patient/Family in Agreement with Plan yes    Plan Comments Pt's chair time is MWF at 1115 at the [facility] location. First treatment is Friday, April 25. Pt's daughter Kathleen [phone] is aware.                   Discharge Codes    No documentation.                       LINNETTE Ramos

## 2025-04-22 LAB
ANION GAP SERPL CALCULATED.3IONS-SCNC: 12 MMOL/L (ref 5–15)
BASOPHILS # BLD AUTO: 0.03 10*3/MM3 (ref 0–0.2)
BASOPHILS NFR BLD AUTO: 0.7 % (ref 0–1.5)
BUN SERPL-MCNC: 55 MG/DL (ref 8–23)
BUN/CREAT SERPL: 15.7 (ref 7–25)
CALCIUM SPEC-SCNC: 8.3 MG/DL (ref 8.6–10.5)
CHLORIDE SERPL-SCNC: 101 MMOL/L (ref 98–107)
CO2 SERPL-SCNC: 25 MMOL/L (ref 22–29)
CREAT SERPL-MCNC: 3.5 MG/DL (ref 0.76–1.27)
DEPRECATED RDW RBC AUTO: 77.4 FL (ref 37–54)
EGFRCR SERPLBLD CKD-EPI 2021: 17.2 ML/MIN/1.73
EOSINOPHIL # BLD AUTO: 0.19 10*3/MM3 (ref 0–0.4)
EOSINOPHIL NFR BLD AUTO: 4.3 % (ref 0.3–6.2)
ERYTHROCYTE [DISTWIDTH] IN BLOOD BY AUTOMATED COUNT: 20.3 % (ref 12.3–15.4)
GLUCOSE SERPL-MCNC: 93 MG/DL (ref 65–99)
HCT VFR BLD AUTO: 27.1 % (ref 37.5–51)
HGB BLD-MCNC: 8.2 G/DL (ref 13–17.7)
IMM GRANULOCYTES # BLD AUTO: 0.02 10*3/MM3 (ref 0–0.05)
IMM GRANULOCYTES NFR BLD AUTO: 0.5 % (ref 0–0.5)
LYMPHOCYTES # BLD AUTO: 0.82 10*3/MM3 (ref 0.7–3.1)
LYMPHOCYTES NFR BLD AUTO: 18.6 % (ref 19.6–45.3)
MCH RBC QN AUTO: 31.4 PG (ref 26.6–33)
MCHC RBC AUTO-ENTMCNC: 30.3 G/DL (ref 31.5–35.7)
MCV RBC AUTO: 103.8 FL (ref 79–97)
MONOCYTES # BLD AUTO: 0.56 10*3/MM3 (ref 0.1–0.9)
MONOCYTES NFR BLD AUTO: 12.7 % (ref 5–12)
NEUTROPHILS NFR BLD AUTO: 2.8 10*3/MM3 (ref 1.7–7)
NEUTROPHILS NFR BLD AUTO: 63.2 % (ref 42.7–76)
PLATELET # BLD AUTO: 97 10*3/MM3 (ref 140–450)
PMV BLD AUTO: 10 FL (ref 6–12)
POTASSIUM SERPL-SCNC: 4.4 MMOL/L (ref 3.5–5.2)
RBC # BLD AUTO: 2.61 10*6/MM3 (ref 4.14–5.8)
SODIUM SERPL-SCNC: 138 MMOL/L (ref 136–145)
WBC NRBC COR # BLD AUTO: 4.42 10*3/MM3 (ref 3.4–10.8)

## 2025-04-22 PROCEDURE — 25010000002 HEPARIN (PORCINE) PER 1000 UNITS: Performed by: INTERNAL MEDICINE

## 2025-04-22 PROCEDURE — 85025 COMPLETE CBC W/AUTO DIFF WBC: CPT | Performed by: NURSE PRACTITIONER

## 2025-04-22 PROCEDURE — 80048 BASIC METABOLIC PNL TOTAL CA: CPT | Performed by: NURSE PRACTITIONER

## 2025-04-22 RX ORDER — TRAMADOL HYDROCHLORIDE 50 MG/1
50 TABLET ORAL 2 TIMES DAILY PRN
Status: DISCONTINUED | OUTPATIENT
Start: 2025-04-22 | End: 2025-04-24

## 2025-04-22 RX ADMIN — TRAMADOL HYDROCHLORIDE 50 MG: 50 TABLET, COATED ORAL at 13:06

## 2025-04-22 RX ADMIN — ISOSORBIDE DINITRATE 10 MG: 10 TABLET ORAL at 17:41

## 2025-04-22 RX ADMIN — HYDRALAZINE HYDROCHLORIDE 100 MG: 50 TABLET ORAL at 17:41

## 2025-04-22 RX ADMIN — TAMSULOSIN HYDROCHLORIDE 0.4 MG: 0.4 CAPSULE ORAL at 21:06

## 2025-04-22 RX ADMIN — TRAMADOL HYDROCHLORIDE 50 MG: 50 TABLET, COATED ORAL at 21:07

## 2025-04-22 RX ADMIN — ISOSORBIDE DINITRATE 10 MG: 10 TABLET ORAL at 12:45

## 2025-04-22 RX ADMIN — CARVEDILOL 25 MG: 25 TABLET, FILM COATED ORAL at 21:06

## 2025-04-22 RX ADMIN — ASPIRIN 81 MG: 81 TABLET, COATED ORAL at 12:47

## 2025-04-22 RX ADMIN — CETIRIZINE HYDROCHLORIDE 10 MG: 10 TABLET, FILM COATED ORAL at 12:45

## 2025-04-22 RX ADMIN — CLONIDINE HYDROCHLORIDE 0.3 MG: 0.1 TABLET ORAL at 11:49

## 2025-04-22 RX ADMIN — SODIUM BICARBONATE 650 MG: 650 TABLET ORAL at 14:48

## 2025-04-22 RX ADMIN — MESALAMINE 1.5 G: 375 CAPSULE, EXTENDED RELEASE ORAL at 12:45

## 2025-04-22 RX ADMIN — FUROSEMIDE 40 MG: 40 TABLET ORAL at 21:07

## 2025-04-22 RX ADMIN — CALCITRIOL CAPSULES 0.25 MCG 0.5 MCG: 0.25 CAPSULE ORAL at 12:45

## 2025-04-22 RX ADMIN — ALPRAZOLAM 0.25 MG: 0.25 TABLET ORAL at 21:07

## 2025-04-22 RX ADMIN — CLONIDINE HYDROCHLORIDE 0.3 MG: 0.1 TABLET ORAL at 17:41

## 2025-04-22 RX ADMIN — CLOPIDOGREL BISULFATE 75 MG: 75 TABLET, FILM COATED ORAL at 12:45

## 2025-04-22 RX ADMIN — SODIUM BICARBONATE 650 MG: 650 TABLET ORAL at 17:41

## 2025-04-22 RX ADMIN — MONTELUKAST SODIUM 10 MG: 10 TABLET, COATED ORAL at 21:06

## 2025-04-22 RX ADMIN — HEPARIN SODIUM 3600 UNITS: 1000 INJECTION, SOLUTION INTRAVENOUS; SUBCUTANEOUS at 12:23

## 2025-04-22 RX ADMIN — ACETAMINOPHEN 1000 MG: 500 TABLET, FILM COATED ORAL at 12:45

## 2025-04-22 RX ADMIN — CLONIDINE HYDROCHLORIDE 0.3 MG: 0.1 TABLET ORAL at 21:06

## 2025-04-22 RX ADMIN — NIFEDIPINE 120 MG: 60 TABLET, FILM COATED, EXTENDED RELEASE ORAL at 12:45

## 2025-04-22 RX ADMIN — ATORVASTATIN CALCIUM 10 MG: 10 TABLET, FILM COATED ORAL at 12:44

## 2025-04-22 RX ADMIN — PANTOPRAZOLE SODIUM 40 MG: 40 TABLET, DELAYED RELEASE ORAL at 06:01

## 2025-04-22 RX ADMIN — LEVOTHYROXINE SODIUM 100 MCG: 100 TABLET ORAL at 06:01

## 2025-04-22 RX ADMIN — HYDRALAZINE HYDROCHLORIDE 100 MG: 50 TABLET ORAL at 21:06

## 2025-04-22 RX ADMIN — SODIUM BICARBONATE 650 MG: 650 TABLET ORAL at 21:06

## 2025-04-22 RX ADMIN — Medication 10 MG: at 21:06

## 2025-04-22 NOTE — PROGRESS NOTES
Patient Name: Ricardo Hugo  Date of Admission: 4/20/2025  Today's Date: 04/22/25  Length of Stay: 2  Primary Care Physician: Nathan Zimmer MD    Subjective   Chief Complaint: Right lower extremity pain  HPI   Today: Resting in bed eating lunch.  He had dialysis this morning 2 L removed.  He reports pain in right leg at this time and rates 3/10 but does increase with movement and he rates 8/10 described as sharp and shooting.  Patient also reports having numbness in the right leg and foot but is able to feel pain when squeezed.  Patient reports that Dr. Pineda seen him this morning but I have no note to review.      Documented weights    04/20/25 0750 04/21/25 0501 04/22/25 0453   Weight: 60.3 kg (133 lb) 67.5 kg (148 lb 12.8 oz) 61.6 kg (135 lb 14.4 oz)        Intake/Output Summary (Last 24 hours) at 4/22/2025 1346  Last data filed at 4/22/2025 0820  Gross per 24 hour   Intake 840 ml   Output 1065 ml   Net -225 ml       Results for orders placed during the hospital encounter of 01/10/25    Adult Transthoracic Echo Complete w/ Color, Spectral and Contrast if Necessary Per Protocol    Interpretation Summary    Left ventricular systolic function is normal. Left ventricular ejection fraction appears to be 56 - 60%.    Left ventricular wall thickness is consistent with mild septal asymmetric hypertrophy.    Left ventricular diastolic function is consistent with (grade II w/high LAP) pseudonormalization.    Normal right ventricular cavity size and systolic function noted.    The left atrial cavity is mild to moderately dilated.    The right atrial cavity is dilated.    Mild aortic valve stenosis is present.    Mild to moderate mitral valve regurgitation is present.    Moderate to severe tricuspid valve regurgitation is present.    Estimated right ventricular systolic pressure from tricuspid regurgitation is markedly elevated (>55 mmHg).       Review of Systems   All pertinent negatives and positives are as  above. All other systems have been reviewed and are negative unless otherwise stated.     Objective    Temp:  [97.8 °F (36.6 °C)-99 °F (37.2 °C)] 98 °F (36.7 °C)  Heart Rate:  [55-67] 67  Resp:  [16] 16  BP: (145-180)/(45-67) 160/48  Physical Exam  Vitals and nursing note reviewed.   Constitutional:       Appearance: He is ill-appearing (Chronic).   HENT:      Head: Normocephalic and atraumatic.      Mouth/Throat:      Mouth: Mucous membranes are moist.      Pharynx: Oropharynx is clear.   Eyes:      Extraocular Movements: Extraocular movements intact.      Conjunctiva/sclera: Conjunctivae normal.   Cardiovascular:      Rate and Rhythm: Normal rate and regular rhythm.      Heart sounds: Murmur heard.   Pulmonary:      Effort: Pulmonary effort is normal.      Breath sounds: Normal breath sounds.   Abdominal:      General: There is no distension.      Palpations: Abdomen is soft.   Musculoskeletal:      Cervical back: Normal range of motion and neck supple.      Right lower leg: No edema.      Left lower leg: No edema.   Skin:     General: Skin is warm and dry.      Comments: Bilateral lower extremities dusky  Multiple bruises scattered   Neurological:      General: No focal deficit present.      Mental Status: He is alert and oriented to person, place, and time.   Psychiatric:         Mood and Affect: Mood normal.         Behavior: Behavior normal.       Results Review:  I have reviewed the labs, radiology results, and diagnostic studies.    Laboratory Data:   Results from last 7 days   Lab Units 04/22/25  0245 04/21/25  0850 04/20/25  2348 04/20/25 1732 04/20/25  0849   WBC 10*3/mm3 4.42 4.58  --   --  5.29   HEMOGLOBIN g/dL 8.2* 8.4* 8.6*   < > 6.6*   HEMATOCRIT % 27.1* 27.6* 27.5*   < > 22.0*   PLATELETS 10*3/mm3 97* 96*  --   --  82*    < > = values in this interval not displayed.        Results from last 7 days   Lab Units 04/22/25  0245 04/21/25  0850 04/20/25  2348 04/20/25  1732 04/20/25  1400   SODIUM  mmol/L 138 137  137 139   < >  --    POTASSIUM mmol/L 4.4 4.9  4.9 5.2   < >  --    CHLORIDE mmol/L 101 105  105 106   < >  --    CO2 mmol/L 25.0 17.0*  17.0* 16.0*   < >  --    BUN mg/dL 55* 99*  99* 101*   < >  --    CREATININE mg/dL 3.50* 4.35*  4.35* 4.51*   < >  --    CALCIUM mg/dL 8.3* 8.5*  8.5* 8.8   < >  --    BILIRUBIN mg/dL  --  0.2  --   --  0.2   ALK PHOS U/L  --  145*  --   --  176*   ALT (SGPT) U/L  --  10  --   --  10   AST (SGOT) U/L  --  14  --   --  17   GLUCOSE mg/dL 93 107*  107* 138*   < >  --     < > = values in this interval not displayed.       Culture Data:     Microbiology Results (last 10 days)       Procedure Component Value - Date/Time    Eosinophil Smear - Urine, Urine, Clean Catch [[id number]]  (Normal) Collected: 04/20/25 1509    Lab Status: Final result Specimen: Urine, Clean Catch Updated: 04/21/25 0020     Eosinophil Smear 0 % EOS/100 Cells              Radiology Data:   Imaging Results (Last 7 Days)       Procedure Component Value Units Date/Time    CT Angio Abdominal Aorta Bilateral Iliofem Runoff [[id number]] Collected: 04/21/25 1621     Updated: 04/21/25 1636    Narrative:      EXAM/TECHNIQUE: CT angiography with 3D MIP images abdomen and pelvis  with bilateral lower extremity runoff     INDICATION: aortoiliac disease right lower extremity ischemia;  N18.9-Chronic kidney disease, unspecified; E87.5-Hyperkalemia;  D64.9-Anemia, unspecified; M54.16-Radiculopathy, lumbar region;  I73.9-Peripheral vascular disease, unspecified     COMPARISON: 9/15/2020     DLP: 479.06 mGy.cm. Automated exposure control was utilized to decrease  patient radiation dose.     FINDINGS:     Nonaneurysmal abdominal aorta with heavy atherosclerotic calcification.  Celiac artery SMA, and MIKI are widely patent. Mild atherosclerotic  narrowing of the left main renal artery. There are 2 renal arteries  bilaterally.     The right common iliac and internal iliac arteries are widely patent.  Focal  moderate to severe atherosclerotic stenosis of the right external  iliac artery on series 4 image 155. The right common femoral artery is  widely patent and contains heavy atherosclerotic calcification. Right  profunda is widely patent. Heavy atherosclerotic calcification  throughout the right SFA with multifocal areas of moderate stenosis.  Patent atherosclerotic popliteal artery. Three-vessel right lower  extremity runoff.     The left common iliac and internal iliac artery are widely patent.  Moderate atherosclerotic stenosis of the left external iliac artery on  series 4 image 159. Left common femoral artery is widely patent. Left  profunda is widely patent. Heavy atherosclerotic calcification  throughout the SFA with multifocal areas of proximal stenosis. The left  SFA appears occluded along its mid course (series 4 image 297) with  reconstitution occurring at the distal vessel. After reconstitution,  there is an area of severe left SFA stenosis on series 4 image 359.  Patent left popliteal artery. Three-vessel left lower extremity runoff  appears maintained.     Nonvascular findings:     Moderate right pleural effusion with overlying atelectasis. Trace left  pleural effusion. Cardiomegaly. Liver appears cirrhotic. 9 mm arterial  enhancing lesion in the left liver on image 54. No gallstone or biliary  ductal dilatation. The pancreas, spleen, and adrenal glands are  unremarkable. Multifocal bilateral renal cortical scarring. No  hydronephrosis or large calculi. No focal urinary bladder wall  thickening.      Moderate volume stool throughout the colon. No colonic wall thickening  or pericolonic fat stranding. No small bowel distention or inflammation.     No evidence of ascites. There is diffuse edematous change throughout the  mesentery/peritoneum. No pelvic mass or collection. Prostate is enlarged  measuring 5.3 cm transverse dimension. No pathologically enlarged  abdominal or pelvic lymph nodes.     Diffuse  abdominal wall soft tissue edema. Right hip arthroplasty. Left  femoral head avascular necrosis with areas of subchondral collapse  noted.       Impression:         1.  Nonaneurysmal abdominal aorta with heavy atherosclerotic  calcification throughout.     2.  Focal moderate-severe atherosclerotic stenosis of the right external  iliac artery. Heavy atherosclerotic calcification throughout the right  SFA with multifocal areas of moderate stenosis. Three-vessel right lower  extremity runoff.     3.  Moderate atherosclerotic stenosis of the left external iliac artery.  Heavy atherosclerotic calcification throughout the left SFA with  multifocal moderate stenosis proximally and complete occlusion of the  mid vessel with reconstitution distally. After reconstitution, there is  an area of focal severe left SFA stenosis. Three-vessel left lower  extremity runoff appears maintained.     4.  Moderate right pleural effusion. Cardiomegaly.     5.  Liver appears cirrhotic. 9 mm enhancing lesion in the left liver on  image 54 series 4. Recommend follow-up liver protocol CT or MRI for  further characterization.     6.  Anasarca with diffuse abdominal wall soft tissue edema and extensive  diffuse edematous change throughout the mesentery and peritoneum.     7.  Advanced left femoral head avascular necrosis.           This report was signed and finalized on 4/21/2025 4:33 PM by Dr. Wellington Farley MD.       US Arterial Doppler Lower Extremity Complete [[id number]] Collected: 04/21/25 1419     Updated: 04/21/25 1425    Narrative:      History: PAD     Comments: Grayscale imaging as well as color flow duplex were used to  evaluate bilateral lower extremity arterial systems.     On the right, the peak systolic velocity in the common femoral artery  measured 31.7 cm/s. In the profunda femoris artery measured 50.3 cm/s.  The proximal SFA appears occluded. The mid SFA measured 20.1 cm/s. In  the distal SFA measured 48.6 cm/s. In the  popliteal artery measured 25.3  cm/s. In the posterior tibial artery measured 20.7 cm/s. In the anterior  tibial artery measured 35.1 cm/s.     On the left, the peak systolic velocity in the common femoral artery  measured 103.2 cm/s. In the profunda femoris artery measured 123.3 cm/s.  In the proximal SFA measured 26.5 cm/s. The mid SFA appears occluded. In  the distal SFA measured 27.3 cm/s. In the popliteal artery measured 16.3  cm/s. In the posterior tibial artery measured 55.8 cm/s. In the anterior  tibial artery measured 42.5 cm/s.       Impression:      In the right lower extremity, there is heavy plaque burden  in the common femoral artery with an occluded proximal SFA. There is  reconstitution in the mid SFA but dampened flow throughout the lower  extremity with heavy plaque burden throughout. In the left lower  extremity, there is also heavy plaque burden in the common femoral  artery with an occluded mid SFA. There is reconstitution in the distal  SFA with dampened flow throughout the lower extremity with heavy plaque  burden throughout. Further imaging/evaluation may be warranted.     This report was signed and finalized on 4/21/2025 2:22 PM by Dr. Gil Pineda MD.        Renal Bilateral [[id number]] Collected: 04/20/25 1717     Updated: 04/20/25 1722    Narrative:      RENAL ULTRASOUND COMPLETE 4/20/2025 3:47 PM     REASON FOR EXAM: Renal Failure; N18.9-Chronic kidney disease,  unspecified; E87.5-Hyperkalemia; D64.9-Anemia, unspecified;  M54.16-Radiculopathy, lumbar region; I73.9-Peripheral vascular disease,  unspecified.     COMPARISON: None.       TECHNIQUE: Multiple longitudinal and transverse realtime sonographic  images of the kidneys and urinary bladder are obtained.     FINDINGS:     RIGHT KIDNEY: The right kidney measures 9.7 cm in length. The cortical  thickness is normal. There is increased cortical echogenicity. There is  a mid to lower pole cyst measuring 1.7 x 1.4 x 1.6 cm. There is  no  hydronephrosis. No nephrolithiasis or abnormal perinephric fluid  collections.     LEFT KIDNEY: The left kidney measures 10.1 cm in length. The cortical  thickness is normal. There is increased cortical echogenicity. There is  a 1 x 0.7 x 0.9 cm cyst in the lower pole. There is no hydronephrosis.  No nephrolithiasis or abnormal perinephric fluid collections.     PELVIS: There is mild thickening of the bladder wall. There is no free  fluid in the pelvis.     ADDITIONAL FINDINGS: The abdominal aorta is normal caliber. There is  atheromatous calcification.       Impression:      1. Increased cortical echogenicity of both kidneys consistent with  medical renal disease. No hydronephrosis.  2. Bilateral renal cysts.  3. Mild wall thickening of the urinary bladder which may be related to  muscular hypertrophy. Acute and chronic inflammation and infection are  also included in the differential. Neoplasm less likely.  4. Atheromatous abdominal aorta.           The images and report are stored on PAC's per institutional and state  guidelines.           This report was signed and finalized on 4/20/2025 5:19 PM by Dr. Mikael Gallegos MD.       XR Chest 1 View [[id number]] Collected: 04/20/25 1155     Updated: 04/20/25 1159    Narrative:      XR CHEST 1 VW- 4/20/2025 10:40 AM     HISTORY: Preop; N18.9-Chronic kidney disease, unspecified;  E87.5-Hyperkalemia; D64.9-Anemia, unspecified; M54.16-Radiculopathy,  lumbar region; I73.9-Peripheral vascular disease, unspecified       COMPARISON: Chest x-ray dated 4/13/2025     FINDINGS:  Upright frontal radiograph of the chest was obtained     Bilateral interstitial coarsening with subpleural lines reflecting  interstitial edema. Trace left-sided and small to moderate sided right  pleural effusions. No consolidation or pneumothorax. Prior coronary  bypass. Right IJ dual-lumen central venous catheter. No acute bony  abnormality.       Impression:      1.  Volume overload with  interstitial edema and bilateral pleural  effusions as detailed above.     This report was signed and finalized on 4/20/2025 11:56 AM by Dr Tyrell Knox.       CT Lumbar Spine Without Contrast [[id number]] Collected: 04/20/25 0947     Updated: 04/20/25 0953    Narrative:      CT LUMBAR SPINE WO CONTRAST- 4/20/2025 8:24 AM     HISTORY: Right-sided lumbar radiculopathy     COMPARISON: None      DOSE LENGTH PRODUCT: 439.68 mGy.cm. Automated exposure control was also  utilized to decrease patient radiation dose.     TECHNIQUE: Serial helical tomographic images of the lumbar spine were  obtained without the use of intravenous contrast. Additionally, sagittal  and coronal reformatted images were provided for review. Automated  exposure control is utilized to reduce patient radiation dose.     FINDINGS:     Counting will assume 5 lumbar-type vertebrae. The spine is imaged from  T12 to S3.     There is no visualized acute fracture or traumatic subluxation. Lumbar  lordosis is maintained. Vertebral body heights are well maintained.  Multilevel loss of intervertebral disc height, L5-S1 in particular. No  high-grade bony narrowing of the spinal canal. Advanced facet  arthropathy. Posterior elements are intact. Neuroforaminal narrowing at  multiple levels, especially L5-S1 on the right. There is a layering  right-sided pleural effusion. Atheromatous vascular calcification along  the abdominal aorta. Incidentally noted sacral canal Tarlov cysts.  Included portion of the bony pelvis is intact.       Impression:      1. No acute osseous injury or malalignment.   2. Underlying advanced lumbar spine osteoarthritis changes including a  high-grade right-sided neuroforaminal narrowing at L5-S1.  3. Layering right-sided pleural effusion.           This report was signed and finalized on 4/20/2025 9:50 AM by Dr Tyrell Knox.                I have reviewed the patient's current medications.     ALPRAZolam, 0.25 mg, Oral,  Nightly  aspirin, 81 mg, Oral, Daily  atorvastatin, 10 mg, Oral, Daily  calcitriol, 0.5 mcg, Oral, Daily  carvedilol, 25 mg, Oral, Q12H  cetirizine, 10 mg, Oral, Daily  cloNIDine, 0.3 mg, Oral, TID  cloNIDine, 1 patch, Transdermal, Weekly  clopidogrel, 75 mg, Oral, Daily  furosemide, 40 mg, Oral, BID  hydrALAZINE, 100 mg, Oral, TID  isosorbide dinitrate, 10 mg, Oral, TID - Nitrates  levothyroxine, 100 mcg, Oral, Q AM  melatonin, 10 mg, Oral, Nightly  mesalamine, 1.5 g, Oral, Daily  montelukast, 10 mg, Oral, Nightly  NIFEdipine XL, 120 mg, Oral, Daily  pantoprazole, 40 mg, Oral, Q AM  sodium bicarbonate, 650 mg, Oral, 5x Daily  tamsulosin, 0.4 mg, Oral, Nightly       Assessment/Plan   Assessment  Active Hospital Problems    Diagnosis     **Anemia, multifactorial to include chronic kidney disease, iron deficiency and possible bleed     Acute pain of lower extremity, right     Hyperkalemia     CKD (chronic kidney disease) stage 5     Resistant hypertension        Treatment Plan  1.  Chronic kidney disease stage V/end-stage renal disease, nephrology following, patient had dialysis today and 2000 mL was removed, plans for HD again tomorrow, outpatient HD chair time arranged for Monday Wednesday Friday at [facility] outpatient clinic     2.  Anemia multifactorial; check anemia studies, transfused 1 unit packed red blood cells on 4/19, Hgb stable at 8.2     3.  Hyperkalemia; Lokelma 10 g 3 times daily today per nephrology, dialysis today, potassium improved     4.  Resistant hypertension: Home medications reviewed and restarted, monitor per protocol     5.  Acute pain right lower extremity; due to tolerance to Lortab will give acetaminophen 1 g IV every 4 hours as needed, restarted home ultram, vascular surgery following     DVT prophylaxis with SCDs     Medical Decision Making  Number and Complexity of problems: 5  Chronic kidney disease stage V/end-stage renal disease acute on chronic, high complexity, unchanged  Anemia,  acute on chronic, high complexity, worsening  Hyperkalemia, acute, high complexity, unchanged   Resistant hypertension, chronic, high complexity, stable  Pain right lower extremity, acute, high complexity, resolved     Differential Diagnosis: None     Conditions and Status        Condition is unchanged.     [facility] Data  External documents reviewed: No  Cardiac tracing (EKG, telemetry) interpretation: Reviewed  Radiology interpretation: Reviewed  Labs reviewed: Yes  Any tests that were considered but not ordered: No     Decision rules/scores evaluated (example [id number], Wells, etc): No     Discussed with: Patient and Dr. Rodriguez     Care Planning  Shared decision making: Patient, daughter and Dr. Rodriguez  Code status and discussions: DNR/DNI     Disposition  Social Determinants of Health that impact treatment or disposition: No  Estimated length of stay is 2+ days.      I confirmed that the patient's advanced care plan is present, code status is documented, and a surrogate decision maker is listed in the patient's medical record.      The patient's surrogate decision maker is daughter.     Electronically signed by SHELLY Grant, 04/22/25, 13:46 CDT.

## 2025-04-22 NOTE — PLAN OF CARE
Goal Outcome Evaluation:  Plan of Care Reviewed With: patient        Progress: no change        Patient alert and responsive able to make needs known. Patient denies pain and discomfort this shift up with standby assist to bathroom today. Went to dialysis today had 1.5l removed. Oozing continued at permacat site. Pass on on in handoff not to change dressing tonight unless its completely saturated beyond biopatch.no acute distress noted.

## 2025-04-22 NOTE — NURSING NOTE
[facility] [place] [facility]  DIALYSIS TREATMENT SUMMARY     Note: Consult with the attending physician for patient treatment orders, this document is not a physician order.     Patient Information  Patient iRcardo Hugo  Date of Birth February 22, 1950  Chart Number [id number]  Location [facility]  Location MRN [id number]  Gender Male  SSN (last 4)   Treatment Information  Treatment Type Hemodialysis  Treatment Id [id number]  Start Time April 22, 2025 08:50  End Time April 22, 2025 12:20  Acutal Duration 03:30  Treatment Balances  Total Saline Administered 500  Net Fluid Balance -2000  Hemodialysis Orders  Therapy Standard  Orders Verified Time 04/22/2025 08:10   Date Verified 04/22/2025  Duration 3:30  Isolated UF/Bypass No  BFR (mL) 400  DFR X1.5 BFR  DFR (mL) 600  Dialyzer Type OPTIFLUX 160NR  UF Order UF Range  UF Range (mL) 500 - 2000  With BP Parameters Yes  As Tolerated Yes  Crit-Line used No  Heparin Initial Units Bolus No  Heparin IV Maintenance Bolus No  Heparin IV Infusion No  Potassium (mEq/L) 3  Calcium (mEq/L) 2.5  Bicarb (mg/dL) 35  Sodium (mEq/L) 138  Additional Orders KEEP SYSTOLIC BLOOD PRESSURE GREATER THAN 100.  Clinician Nav Will, DEMETRIUS  Dialysis Access  Access Type Central Venous Catheter  Central Venous Catheter  Access Type Catheter - Tunneled  Date Central Venous Catheter Placed 04/16/2025  Access Location Chest Wall - R  Surgeon DR. MCGILL  1st Use Catheter Verified by X-Ray  Catheter Care Completed per Policy Yes  Dressing Dry and Intact on Arrival Yes  Dressing Changed Yes  Type of Dressing Film Biopatch/CHG  Dressing Changed By [facility] Staff  Type of HD Caps Curos  HD Caps Changed Yes  CVC Line Education Provided Yes - Dressing Change Frequency  Comments/Notes RIGHT IJ PERMCATH, PATENT X 2 PORTS, SITE CLEAR  Vitals  Pre-Treatment Vitals  Time Is BP being recorded? Pre BP Map BP Method Pulse RR Temp How was Weight Obtained? Pre Weight Previous Dry Weight Previous Post  Weight Metric Target Fluid Removal (mL) Dialysate Confirmed Clinician  04/22/2025 08:46 BP/Map 139/63 (88) Noninvasive 54 20 98.3 How Obtained: Bed Scale 61.6   Kgs 500 Yes Nav Will, DEMETRIUS  Comments: UF FLUID REMOVAL RANGE 500 ML TO 2000 ML AS TOLERATED. PATIENT IDENTIFICATION VERIFIED, ALLERGIES AND MEDICATIONS REVIEWED. PATIENT IS ALERT, ORIENTED X 3, NO PAIN, NO CHEST PAIN. RESPIRATIONS EVEN AND UNLABORED. LUNGS DIMINISHED. HEART RATE REGULAR, S1 S2, SINUS BRADYCARDIA PER TELEMETRY. SKIN WARM AND DRY, NO EDEMA. LAYING IN BED, HEAD OF BED ELEVATED 40 DEGREES.  Intra Procedure Vitals  Rec Type Time Is BP being recorded? BP Map Pulse RR BFR DFR AP  TMP UFR RMVD Bolus NSS Flush Chills Safety Check Crit-Line HCT Crit-Line BV% Clinician  E 04/22/2025 08:50 BP/Map 131/55 (80) 55 20 150 300 -40 30 80 710 0  250  Yes   Nav Will, DEMETRIUS  Comments: HEMODIALYSIS INITIATED USING RIGHT IJ PERMCATH. ACCESSED PER STERILE TECHNIQUE. PORTS PATENT X 2, LINES CONNECTED, SECURED, VISIBLE. HEMOCLIP SAFETY DEVICES INTACT X 2. PATIENT IS ALERT, ORIENTED X 3, NO PAIN, NO CHEST PAIN. RESPIRATIONS EVEN AND UNLABORED, LUNGS DIMINISHED. HEART RATE REGULAR, S1 S2, SINUS BRADYCARDIA PER TELEMETRY. SKIN WARM AND DRY. NO EDEMA. LAYING IN BED, HEAD OF BED ELEVATED 40 DEGREES.  E 04/22/2025 08:51 BP/Map 131/55 (80) 55 20 400 600 -120 120 90 710 11  0  Yes   Nav Will, DEMETRIUS  Comments: INCREASED BLOOD FLOW RATE AND DIALYSATE FLOW RATE PER ORDER.  E 04/22/2025 09:00 BP/Map 136/49 (78) 59 18 400 600 -140 130 80 710 125  0  Yes   Nav Will, DEMETRIUS  Comments: PATIENT IS ALERT, ORIENTED X 3, NO PAIN, NO CHEST PAIN. RESPIRATIONS EVEN AND UNLABORED. HEART RATE REGULAR, S1 S2, SINUS BRADYCARDIA PER TELEMETRY. SKINW ARM AND DRY. NO COMPLAINTS. READING NEWSPAPER. LAYING IN BED, HEAD OF BED ELEVATED 40 DEGREES. LINES VISIBLE AND SECURE.  E 04/22/2025 09:30 BP/Map 124/45 (71) 55 18 400 600 -150 130 80 710 478  0  Yes   Nav Will  RN  Comments: PATIENT IS ALERT, ORIENTED X 3, NO PAIN, NO CHEST PAIN. RESPIRATIONS EVEN AND U NLABORED. HEART RATE REGULAR, S1 S2, SINUS BRADYCARDIA PER TELEMETRY. SKIN WARM AND DRY. NO COMPLAINTS. LAYING IN BED, HEAD OF BED ELEVATED 30 DEGREES. LINES VISIBLE AND SECURE.  E 04/22/2025 10:00 BP/Map 147/51 (83) 56 18 400 600 -150 130 80 710 830  0  Yes   Nav Will, DEMETRIUS  Comments: PATIENT IS ALERT, ORIENTED X 3, READING NEWSPAPER. NO PAIN, NO CHEST PAIN. RESPIRATIONS EVEN AND UNLABORED. HEART RATE REGULAR, S1 S2, SINUS BRADYCARDIA PER MONITOR. SKIN WARM AND DRY. LAYING IN BED, HEAD OF BED ELEVATED 30 DEGREES. LINES VISIBLE AND SECURE.  E 04/22/2025 10:30 BP/Map 164/54 (91) 57 18 400 600 -150 130 80 710 1180  0  Yes   Nav Will, DEMETRIUS  Comments: PATIENT IS ALERT, ORIENTED X 4, NO PAIN, NO CHEST PAIN. RESPIRATIONS EVEN AND UNLABORED. HEART RATE REGULAR, S1 S2, SINUS BRADYCARDIA PER TELEMETRY. SKIN WARM AND DRY. LAYING IN BED, HEAD OFBED ELEVATED 30 DEGREES. LINES VISIBLE AND SECURE. NO S/S DISTRESS.  E 04/22/2025 11:00 BP/Map 183/60 (101) 57 18 400 600 -160 130 80 710 1532  0  Yes   Nav Will RN  Comments: PATIENT IS ALERT, ORIENTED X 4, NO PAIN, NO CHEST PAIN. RESPIRATIONS EVEN AND UNLABORED. READING NEWSPAPER. LAYING IN BED, HEAD OF BED ELEVATED 40 DEGREES. LINES VISIBLE AND SECURE. HEART RATE REGULAR, S1 S2, SINUS BRADYCARDIA PER TELEMETRY. SKIN WARM AND DRY. NO S/S DISTRESS.  E 04/22/2025 11:30 BP/Map 194/61 (105) 57 18 400 600 -160 130 80 710 1885  0  Yes   Nav Will, DEMETRIUS  Comments: COMPLAINTS OF RIGHT LEG PAIN. NOTIFIED, FLOOR NURSE FOR PAIN MEDICATION AND BLOOD PRESSURE MEDICATION. PATIENT IS ALERT, ORIENTED X 4, RESPIRATIONS EVEN AND UNLABORED. HEART RATE REGULAR, S1 S2, SINUS BRADYCARDIA PER TELEMETRY. SKIN WARM AND DRY. LAYING IN BED, HEAD OF BED ELEVATED 40 DEGREES. LINES VISIBLE AND SECURE.  E 04/22/2025 12:00 BP/Map 137/59 (85) 58 18 400 600 -160 130 80 710 2229  0  Yes   Nav Will,  RN  Comments: PATIENT IS ALERT, ORIENTED X 4, NO COMPLAINTS. BLOOD PRESSURE IMPROVED, AFTER RECEIVING BLOOD PRESSURE MEDICATIONS. SKIN WARM AND DRY. HEART RATE REGULAR, S1 S2, SINUS BRADYCARDIA PER MONITOR. RESPIRATIONS EVEN AND UNLABORED. LAYING IN BED, HEAD OF BED ELEVATED 40 DEGREES. LINES VISIBLE AND SECURE.  E 04/22/2025 12:20 BP/Map 162/59 (93) 59 18 150 500 -20 50 80 300 2500  250  Yes   Nav Will RN  Comments: HEMODIALYSIS FINISHED, BLOOD RETURNED.  Post Treatment Vitals  Time Is BP being recorded? BP Map Pulse RR Temp How was Weight Obtained? Post Weight Metric BVP UF Goal Ordered NSS Given Intra-Procedure Total Machine UF Removed (mL) Crit-Line Ending Profile Crit-Line Refill Crit-Line Ending HCT Crit-Line Max BV% Clinician  04/22/2025 12:35 BP/Map 167/58 (94) 59 18 97.8 How Obtained: Bed scale 59.6 Kgs 81 2000 500 2500     Nav Will, DEMETRIUS  Comments: PATIENT IS ALERT, ORIENTED X 4. HEART RATE REGULAR, S1 S2, SINUS BRADYCARDIA PER TELEMETRY. SKIN WARM AND DRY, NO EDEMA. RESPIRATIONS EVEN AND UNLABORED. LUNGS CLEAR AND DIMINISHED. LAYING IN BED, HEAD OF BED ELEVATED 40 DEGREES. STILL HAS RIGHT LEG PAIN.  Safety checks include: access uncovered and secured, Hemaclip secured for all central line access, machine checks performed, and alarm limits confirmed.  Labs  Hepatitis  HBsAG Lab Result HBsAG Lab Result HBsAG Draw Date Transient Antigenemia(MD Diagnosis Only) Anti-HBs Lab Result Anti-HBs Lab Value Anti-HBs Draw Date Documented By Documented Date Hepatitis Status Hepatitis Status  Negative  04/20/2025  Negative  04/20/2025 Nav Will 04/22/2025  Susceptible  Notes:   Pre-Treatment Hepatitis Precautions Patient tx outside buffer zone Yes Hepatitis Information Entered By Nav Will RN  Patient tx at bedside 1:1 Yes Hard copy verified by nurse and placed in patient’s hospital chart Yes Signing  Patient tx with immune pts only Yes Copy of hepatitis results verified in hospital EMR Yes Signed  By Nav Will RN  Pre-Treatment Handoff  Staff Report Received Yes  Report Given by Primary Nurse XOCHITL BIANCHI RN  Time 08:05  Patient Arrival  Patient ID Verified Date of Birth  MRN  Full Name  Patient Consent to treatment verified Yes  Blood Transfusion Consent Verified N/A  Treatment Comments  Treatment Notes PATIENT IS ALERT, ORIENTED X 4, NO PAIN, NO CHEST PAIN. RESPIRATIONS 18 EVEN AND UNLABORED. LUNGS CLEAR AND DIMINISHED. HEART RATE REGULAR, S1 S2, SINUS BRADYCARDIA PER TELEMETRY AT 59. SKIN WARM AND DRY, NO EDEMA. BLOOD PRESSURE 167/58, MAP 94, TEMPERATURE 97.8 DEGREES. REMOVED 2000 ML OF FLUID ON DIALYSIS. POST WEIGHT IS 59.6 KG. TOLERATED TREATMENT WELL.  Post-Treatment Handoff  Report Given to Primary Nurse XOCHITL BIANCHI RN  Time Report Given 12:47  Report Given By Nav Will RN  Machine Validation  Time 08:35  Date 4/22/2025  Auto Alarm Test Passed Yes  Machine Serial # [id number]  Portable RO Yes  RO Serial# [id number]  Residual Bleach Negative Yes  Was a manufactured mix used? Yes  Machine Log Completed Yes  Total Chlorine (less than 0.1)? Yes  Total Chlorine Log Completed Yes  Bicarb BiBag  Bibag Size 900  Machine Temp 36.5  Machine Conductivity 13.8  Meter Type N/A  Meter Conductivity   Independent Conductivity 13.7  pH Status Pass Pass  pH   TCD Value 13.8  TCD Alarm with +/- 0.5 Yes  NVL enabled validated 100 asymmetric Yes  Safety check complete Nav Will RN  Second Verification Performed? Yes  Second Verification Performed By Nav Will RN  Reason Not Verified   Serum Lab Values  Time BUN Creatinine Na K (mEq/L) Cl CO2 Ca (mEq/L) Phos Mg (mg/dL) Alb (g/dL) Glucose Hgb Hct WBC Plt PT aPTT INR Other Clinician  04/22/2025 02:45 55 3.5 138 4.4 101 25 8.3 - - - 93 8.2 27.1 4.42 97 - - - - Nav Will RN  Comments:   Administered Medications  Time Medication Dose Route Reason for Admin Clinician  04/22/2025 12:23 HEPARIN 1000 UNITS / ML 3.6 ML / 3600 UNITS IVP CATHETER  LOCKS Nav Will RN  Comments: POST DIALYSIS, INSTILL HEPARIN 1000 UNITS / ML, 1.8 ML, 1800 UNITS INTO EACH PORT OF RIGHT IJ PERMCATH, X 2 PORTS, 3.6 ML TOTAL, 3600 UNITS HEPARIN TOTAL AS CATHETER LOCKS.  Facility Information Room # 384 - 3C Diagnosis ESRD; HYPERTENSION; ANEMIA; METABOLIC ACIDOSIS; RIGHT LEG PAIN - RADICULOPATHY  Facility Information Stat Treatment No Isolation Information  Admission Date 04/20/2025 Patient Type New patient to dialysis with diagnosis of ESRD Isolation Required? N  Ordering MD DR. ARREDONDO Patient Chronic Unit [facility] Completed by  Account/Finance Number [id number] Patient Home Unit 66[facility] - [facility] Facility information Entered by Nav Will RN  Admission Status InPatient Code Status Limited   Location Dialysis Suite Multi Diagnosis   Start Treatment Time Out Confirmed by DEMETRIUS THOMPSON RN Correct access site verified Yes  Treatment Initiation Connections Secured Time Out Completed 08:42 Treatment Start Date 04/22/2025  Saline line double clamped Correct patient verified Yes Treatment Start Time 08:50  Hemaclip Applied Correct procedure verified Yes Patient/Family questions and concerns addressed Yes  Pre Focused Assessment Notes RESPIRATIONS 20 EVEN AND UNLABORED GI / Bowels  Access Edema GI Soft  Date Inserted 04/16/2025 Location None  Bowel sounds present  Signs and Symptoms of Infection? No Cardiac  Non distended  Notes RIGHT IJ PERMCATH, PATENT X 2 PORTS, SITE CLEAR Heart Rhythm Regular   Pain Screening Telemetry Yes  Voids  Does the patient have pain? No Notes SINUS BRADYCARDIA Completed by  Respiratory Skin Pre Treatment Focused Assessment Completed By Nav Will RN  Lung Sounds Diminished Skin Warm Time 08:44  Location Bilateral  Dry Signing  Position Anterior LOC Signed By Nav Will RN  Respiratory Efforts Unlabored LOC Alert and Oriented x3   Education  Fluid Intake  Medications  Patient Education  Hemodialysis treatment review  Diet  and/or exercise  Patient Educated? Yes Method Knowledge / Understanding Assessed Demonstration  Fluid Intake  Focus or Topic Treatment Options Family Education Provided? N/A  Hemodialysis treatment review  Access Maintenance Caregiver Education Provided? Yes Method Knowledge / Understanding Assessed Teach back  Medications Focus or Topic Treatment Options Patient Education Introduced By  Diet and/or exercise  Access Maintenance Patient Education Introduced By Nav Will RN  Post-Treatment HD machine external disinfection completed per policy Yes Notes VERBAL REPORT GIVEN TO XOCHITL BIANCHI RN  Post Treatment Delay PRO external disinfection completed per policy Yes Completed by  Delay N Post Treatment General Information Post Treatment Form Completed By Nav Will RN  Machine Disinfection Requirement Care Transition Document Completed Yes   Post Focused Assessment Lung Sounds Diminished LOC  Changes from Pre Focused Assessment  Clear LOC Alert and Oriented x3  Changes from Pre Treatment Focused Assessment? Yes Location Bilateral GI / Bowels  Access Position Anterior GI Soft  Cath Packed with HEPARIN 1000 UNITS / ML Respiratory Efforts Unlabored  Bowel sounds present  Access Port(ml) 1.8 Notes RESPIRATIONS 18 EVEN AND UNLABORED  Non distended  Return Port(ml) 1.8 Edema   Catheter clamped and capped Yes Location None  Voids  Access Flow Good Cardiac Completed by  Dialyzer Clearance Streaked Heart Rhythm Regular Post Treatment Focused Assessment Completed by Nav Will RN  Dialyzer Clearance Times RIGHT IJ PERMCATH, FLUSHED, HEPARINIZED, CLAMPED, CAPPED Telemetry Yes Date 04/22/2025  Notes RIGHT IJ PERMCATH, FLUSHED, HEPARINIZED, CLAMPED, CAPPED Notes SINUS BRADYCARDIA Time 12:37  Pain Screening Skin Signing  Does the patient have pain? No Skin Warm Signed By Nav Will RN  Respiratory  Dry

## 2025-04-22 NOTE — PLAN OF CARE
Goal Outcome Evaluation:      Pt refused PM clonidine stating he takes 0.3mg daily and had already received this dose from the day shift nurse. No c/o pain. Safety maintained.

## 2025-04-22 NOTE — PROGRESS NOTES
Nephrology ([facility]) Progress Note      Patient:  Ricardo Hugo  YOB: 1950  Date of Service: 4/22/2025  MRN: [id number]   Acct: [id number]   Primary Care Physician: Nathan Zimmer MD  Advance Directive:   Code Status and Medical Interventions: No CPR (Do Not Attempt to Resuscitate); Limited Support; No cardioversion, No intubation (DNI)   Ordered at: 04/20/25 1702     Code Status (Patient has no pulse and is not breathing):    No CPR (Do Not Attempt to Resuscitate)     Medical Interventions (Patient has pulse or is breathing):    Limited Support     Medical Intervention Limits:    No cardioversion       No intubation (DNI)     Level Of Support Discussed With:    Patient     Admit Date: 4/20/2025       Hospital Day: 2  Referring Provider: No Known Provider      Patient personally seen and examined.  Complete chart including Consults, Notes, Operative Reports, Labs, Cardiology, and Radiology studies reviewed as able.        Subjective:  Ricardo Hugo is a 75 y.o. male for whom we were consulted for evaluation and treatment of chronic kidney disease stage 5 with progression to ESRD. Patient has recently been preparing to start hemodialysis. Had permcath placed on 4/16 and was due to start dialysis this week. Presented to ER on 4/20 with severe right lower extremity pain. Labs in ER showed creatinine 4.5 which is similar to his recent baseline. Was found to be significantly anemic with hemoglobin of 6.6. potassium elevated at 6.6. Admitted to medical floor. Received PRBC transfusion. Completed first HD on 4/21 and tolerated well.    Today is seen while undergoing his f hemodialysis treatment. Tolerating well with no complaints. No new overnight issues.   Dialysis   Patient was seen and evaluated on renal replacement therapy and I have personally evaluated the patient and directed the therapy  Modality: Hemodialysis  Access: Catheter  Location: right upper  QB:  400  QD: 600  UF: 2000    Allergies:  Ondansetron, Zofran [ondansetron hcl], Lortab [hydrocodone-acetaminophen], and Allopurinol    Home Meds:  Facility-Administered Medications Prior to Admission   Medication Dose Route Frequency Provider Last Rate Last Admin    cyanocobalamin injection 1,000 mcg  1,000 mcg Intramuscular Q28 Days Ruthie Parra C, APRN   1,000 mcg at 08/11/23 1123     Medications Prior to Admission   Medication Sig Dispense Refill Last Dose/Taking    albuterol sulfate  (90 Base) MCG/ACT inhaler Inhale 2 puffs Every 6 (Six) Hours As Needed for Wheezing or Shortness of Air.   Past Week    ALPRAZolam (XANAX) 0.25 MG tablet Take 1 tablet by mouth Every Night.   Past Week    aspirin (aspirin) 81 MG EC tablet Take 1 tablet by mouth Daily.   Past Week    atorvastatin (LIPITOR) 10 MG tablet Take 1 tablet by mouth Daily. 90 tablet 3 Past Week    calcitriol (ROCALTROL) 0.5 MCG capsule Take 1 capsule by mouth Daily. 90 capsule 2 Past Week    carvedilol (COREG) 25 MG tablet Take 1 tablet by mouth 2 (Two) Times a Day With Meals.   Past Week    cholestyramine light 4 g packet Take 1 packet by mouth Daily. 90 packet 1 Past Week    cloNIDine (CATAPRES) 0.3 MG tablet Take 1 tablet by mouth 3 (Three) Times a Day. 270 tablet 2 Past Week    cloNIDine (CATAPRES-TTS) 0.2 MG/24HR patch Place 1 patch on the skin as directed by provider 1 (One) Time Per Week. THURSDAYS   Past Week    clopidogrel (PLAVIX) 75 MG tablet Take 1 tablet by mouth Daily.   Past Week    Copper Gluconate (Copper Caps) 2 MG capsule Take 2 mg by mouth Daily.   Past Week    docusate sodium (COLACE) 100 MG capsule Take 1 capsule by mouth 3 (Three) Times a Day As Needed for Constipation.   Past Week    Epoetin Anselmo (PROCRIT IJ) Inject 1 dose as directed 1 (One) Time Per Week. Monday   Past Month    fexofenadine (ALLEGRA) 180 MG tablet Take 1 tablet by mouth Daily.   Past Week    fluticasone (FLONASE) 50 MCG/ACT nasal spray Administer 2 sprays  into the nostril(s) as directed by provider Daily.   Past Week    furosemide (Lasix) 20 MG tablet Take 0.5 tablets by mouth Daily.   Past Week    furosemide (LASIX) 40 MG tablet Take 1 tablet by mouth Daily. 90 tablet 2 Past Week    hydrALAZINE (APRESOLINE) 100 MG tablet Take 1 tablet by mouth 3 (Three) Times a Day. 100mg daily   Past Week    isosorbide dinitrate (ISORDIL) 10 MG tablet Take 1 tablet by mouth 3 (Three) Times a Day. 270 tablet 2 Past Week    levothyroxine (Synthroid) 100 MCG tablet Take 1 tablet by mouth Every Morning. 90 tablet 2 Past Week    magnesium chloride ER 64 MG DR tablet Take 1 tablet by mouth Daily.   Past Week    melatonin 5 MG tablet tablet Take 2 tablets by mouth Every Night. (Patient taking differently: Take 3 tablets by mouth Every Night. Patient taking 3 tablets per night)   Past Week    mesalamine (APRISO) 0.375 g 24 hr capsule Take 4 capsules by mouth Daily. 360 capsule 1 Past Week    montelukast (SINGULAIR) 10 MG tablet Take 1 tablet by mouth Every Night.   Past Week    Multiple Vitamins-Minerals (PRESERVISION/LUTEIN) capsule Take 1 capsule by mouth 2 (two) times a day.   Past Week    NIFEdipine CC (ADALAT CC) 60 MG 24 hr tablet Take 2 tablets by mouth Daily for 270 days.   Past Week Bedtime    NON FORMULARY Take 25 mg by mouth At Night As Needed. Zzzquill   Past Week    omeprazole (priLOSEC) 20 MG capsule Take 1 capsule by mouth Daily.   Past Week    sodium bicarbonate 650 MG tablet Take 1 tablet by mouth 5 (Five) Times a Day.   Past Week    spironolactone (ALDACTONE) 25 MG tablet Take 1 tablet by mouth Daily.   Past Week    tamsulosin (FLOMAX) 0.4 MG capsule 24 hr capsule Take 1 capsule by mouth Every Night.   Past Week    traMADol (ULTRAM) 50 MG tablet Take 1 tablet by mouth Every 6 (Six) Hours As Needed for Moderate Pain. 20 tablet 0 Past Week    valsartan (DIOVAN) 320 MG tablet Take 1 tablet by mouth Daily.   Past Week    vitamin D (ERGOCALCIFEROL) 1.25 MG (10507 UT) capsule  capsule Take 1 capsule by mouth 1 (One) Time Per Week.   Past Week       Medicines:  Current Facility-Administered Medications   Medication Dose Route Frequency Provider Last Rate Last Admin    acetaminophen (TYLENOL) 160 MG/5ML oral solution 650 mg  650 mg Oral Q4H PRN Katherine Camarena APRN        Or    acetaminophen (TYLENOL) suppository 650 mg  650 mg Rectal Q4H PRN Katherine Camarena APRN        acetaminophen (TYLENOL) tablet 1,000 mg  1,000 mg Oral Q6H PRN Katherine Camarena APRN   1,000 mg at 04/20/25 1721    ALPRAZolam (XANAX) tablet 0.25 mg  0.25 mg Oral Nightly Katherine Camarena APRN   0.25 mg at 04/21/25 2122    aspirin EC tablet 81 mg  81 mg Oral Daily Katherine Camarena APRN   81 mg at 04/21/25 1546    atorvastatin (LIPITOR) tablet 10 mg  10 mg Oral Daily Katherine Camarena APRN   10 mg at 04/21/25 1546    sennosides-docusate (PERICOLACE) 8.6-50 MG per tablet 2 tablet  2 tablet Oral BID PRN Katherine Camarena APRN        And    polyethylene glycol (MIRALAX) packet 17 g  17 g Oral Daily PRN Katherine Camarena APRN        And    bisacodyl (DULCOLAX) EC tablet 5 mg  5 mg Oral Daily PRN Katherine Camarena APRN        And    bisacodyl (DULCOLAX) suppository 10 mg  10 mg Rectal Daily PRN Katherine Camarena APRN        calcitriol (ROCALTROL) capsule 0.5 mcg  0.5 mcg Oral Daily Katherine Camarena APRN   0.5 mcg at 04/21/25 1547    carvedilol (COREG) tablet 25 mg  25 mg Oral Q12H Katherine Camarena APRN   25 mg at 04/21/25 2121    cetirizine (zyrTEC) tablet 10 mg  10 mg Oral Daily Katherine Camarena APRN   10 mg at 04/21/25 1546    cloNIDine (CATAPRES) tablet 0.3 mg  0.3 mg Oral TID Katherine Camarena APRN   0.3 mg at 04/21/25 1546    cloNIDine (CATAPRES-TTS) 0.2 MG/24HR patch 1 patch  1 patch Transdermal Weekly Katherine Camarena, STERLING        clopidogrel (PLAVIX) tablet 75 mg  75 mg Oral Daily Katherine Camarena APRN   75 mg at 04/21/25 1546    furosemide (LASIX) tablet 40 mg  40 mg Oral BID Katherine Camarena,  APRN   40 mg at 04/21/25 2121    heparin (porcine) injection 3,600 Units  3,600 Units Intravenous PRN Bolivar Rueda MD   3,600 Units at 04/21/25 1320    hydrALAZINE (APRESOLINE) tablet 100 mg  100 mg Oral TID Katherine Camarena, APRN   100 mg at 04/21/25 2120    isosorbide dinitrate (ISORDIL) tablet 10 mg  10 mg Oral TID - Nitrates Katherine Camarena, APRN   10 mg at 04/21/25 1945    levothyroxine (SYNTHROID, LEVOTHROID) tablet 100 mcg  100 mcg Oral Q AM Katherine Camarena, APRN   100 mcg at 04/22/25 0601    melatonin tablet 10 mg  10 mg Oral Nightly Katherine Camarena, APRN   10 mg at 04/21/25 2121    mesalamine (APRISO) 24 hr capsule 1.5 g  1.5 g Oral Daily Katherine Camarena, APRN   1.5 g at 04/21/25 1546    montelukast (SINGULAIR) tablet 10 mg  10 mg Oral Nightly Katherine Camarena, APRN   10 mg at 04/21/25 2120    NIFEdipine XL (PROCARDIA XL) 24 hr tablet 120 mg  120 mg Oral Daily Katherine Camarena APRN   120 mg at 04/21/25 1546    pantoprazole (PROTONIX) EC tablet 40 mg  40 mg Oral Q AM Katherine Camarena, APRN   40 mg at 04/22/25 0601    promethazine (PHENERGAN) tablet 12.5 mg  12.5 mg Oral Q6H PRN Katherine Camarena APRN   12.5 mg at 04/20/25 2305    sodium bicarbonate tablet 650 mg  650 mg Oral 5x Daily Katherine Camarena, APRN   650 mg at 04/21/25 2121    tamsulosin (FLOMAX) 24 hr capsule 0.4 mg  0.4 mg Oral Nightly Katherine Camarena, APRN   0.4 mg at 04/21/25 2120       Past Medical History:  Past Medical History:   Diagnosis Date    3-vessel CAD 08/11/2020    Allergic rhinitis     Anemia     Anxiety disorder 04/27/2020    Arthritis     Asymmetrical sensorineural hearing loss 06/28/2017    Atherosclerosis of native artery of both lower extremities with intermittent claudication 07/18/2019    Avascular necrosis of femoral head, left 07/11/2020    right hip after surgery    Carotid stenosis     Chronic mucoid otitis media     Chronic rhinitis     COPD (chronic obstructive pulmonary disease)     Coronary  artery disease     HEART BYPASS 2004    Crohn's disease of large intestine with other complication 07/30/2020    Chronic diarrhea Colonoscopy July 2020 revealed mild patchy scattered hemosiderin staining with inflammation more so in rectosigmoid area.  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    Iron deficiency anemia 07/14/2020    Mixed hearing loss of left ear     PAD (peripheral artery disease) 08/11/2020    Perianal abscess     Pernicious anemia 08/17/2020    took shots but never diagnosed with b12 deficiency    Personal history of alcoholism 08/11/2020    quit drinking in 2013    Prostatic hypertrophy 08/11/2020    Sensorineural hearing loss     Sepsis with acute renal failure 09/15/2020    Shortness of breath 05/27/2021    Tinnitus     Ventricular tachycardia, nonsustained 07/14/2020    Weight loss 07/11/2020       Past Surgical History:  Past Surgical History:   Procedure Laterality Date    ARTERY SURGERY  2021    right side on neck    CAROTID ENDARTERECTOMY Right 05/10/2021    Procedure: RIGHT CAROTID ENDARTERECTOMY WITH EEG;  Surgeon: Gil Pineda DO;  Location: [facility] OR ;  Service: Vascular;  Laterality: Right;    COLONOSCOPY N/A 07/02/2020    Procedure: COLONOSCOPY WITH ANESTHESIA;  Surgeon: Adrien Brewster MD;  Location: [facility] ENDOSCOPY;  Service: Gastroenterology;  Laterality: N/A;  pre op: diarrhea  post op: polyps  PCP: Joe Velasco MD    COLONOSCOPY N/A 10/13/2020    Procedure: COLONOSCOPY WITH ANESTHESIA;  Surgeon: Adrien Brewster MD;  Location: [facility] ENDOSCOPY;  Service: Gastroenterology;  Laterality: N/A;  Pre:  Chronic Diarrhea, Crohn's  Post: AVM  Dr. Neftali Velasco  CO2 Inflation Used    COLONOSCOPY N/A 12/08/2023    Procedure: COLONOSCOPY WITH ANESTHESIA;  Surgeon: Adrien Brewster MD;  Location: [facility] ENDOSCOPY;  Service: Gastroenterology;  Laterality: N/A;  pre chrone's disease  post sub optimal prep, polyp, chrone's      CORONARY ARTERY BYPASS GRAFT  2003    x3    ENDOSCOPY N/A 11/02/2021    Procedure: ESOPHAGOGASTRODUODENOSCOPY WITH ANESTHESIA;  Surgeon: Bridger Bell MD;  Location: [facility] ENDOSCOPY;  Service: Gastroenterology;  Laterality: N/A;  pre anemia;gi bleed  post  gi bleed;schatski ring  Dr. ERIC Velasco    ENDOSCOPY N/A 10/10/2023    Procedure: ESOPHAGOGASTRODUODENOSCOPY WITH ANESTHESIA;  Surgeon: Adrien Brewster MD;  Location: [facility] ENDOSCOPY;  Service: Gastroenterology;  Laterality: N/A;  preop; anemia  postop esophagitis ; R/O barretts   PCP Randall Beata    EYE SURGERY Bilateral     catorac    INCISION AND DRAINAGE PERIRECTAL ABSCESS N/A 03/03/2017    Procedure: INCISION AND DRAINAGE OF JEET ANAL ABSCESS;  Surgeon: Lynette Smith MD;  Location: [facility] OR;  Service:     INGUINAL HERNIA REPAIR Bilateral 06/27/2023    Procedure: INGUINAL HERNIA BILATERAL REPAIR LAPAROSCOPIC WITH DAVINCI ROBOT WITH MESH;  Surgeon: Tahira Rivera MD;  Location: [facility] OR;  Service: Robotics - DaVinci;  Laterality: Bilateral;    INSERTION HEMODIALYSIS CATHETER N/A 4/16/2025    Procedure: HEMODIALYSIS CATHETER PLACEMENT;  Surgeon: Gil Pineda DO;  Location: [facility] HYBRID OR;  Service: Vascular;  Laterality: N/A;    MYRINGOTOMY W/ TUBES Left 04/17/2017    06/10/2016    TONSILLECTOMY      TOTAL HIP ARTHROPLASTY Right 2006       Family History  Family History   Problem Relation Age of Onset    Breast cancer Mother     Dementia Father     Glaucoma Father     No Known Problems Daughter     Colon polyps Neg Hx     Colon cancer Neg Hx        Social History  Social History     Socioeconomic History     Marital status:    Tobacco Use    Smoking status: Former     Current packs/day: 0.00     Average packs/day: 0.5 packs/day for 25.8 years (12.9 ttl pk-yrs)     Types: Cigarettes     Start date:      Quit date: 10/13/2013     Years since quittin.5     Passive exposure: Past    Smokeless tobacco: Never    Tobacco comments:     quit 2013   Vaping Use    Vaping status: Former    Substances: Nicotine    Devices: Pre-filled or refillable cartridge   Substance and Sexual Activity    Alcohol use: Not Currently    Drug use: No    Sexual activity: Not Currently     Partners: Female       Review of Systems:  History obtained from chart review and the patient  General ROS: No fever or chills  Respiratory ROS: No cough, shortness of breath, wheezing  Cardiovascular ROS: No chest pain or palpitations  Gastrointestinal ROS: No abdominal pain or melena  Genito-Urinary ROS: No dysuria or hematuria  Psych ROS: No anxiety and depression  14 point ROS reviewed with the patient and negative except as noted above and in the HPI unless unable to obtain.    Objective:  Patient Vitals for the past 24 hrs:   BP Temp Temp src Pulse Resp SpO2 Weight   25 0820 147/51 98.8 °F (37.1 °C) Oral 60 16 94 % --   25 0453 178/51 97.8 °F (36.6 °C) Oral 58 16 93 % 61.6 kg (135 lb 14.4 oz)   25 2350 179/53 99 °F (37.2 °C) Oral 60 16 93 % --   25 1921 180/45 98.6 °F (37 °C) Oral 58 16 96 % --   25 1648 145/49 98.3 °F (36.8 °C) Oral 55 16 -- --   25 1546 171/67 -- -- 61 -- -- --       Intake/Output Summary (Last 24 hours) at 2025 1028  Last data filed at 2025 0820  Gross per 24 hour   Intake 840 ml   Output 1815 ml   Net -975 ml     General: awake/alert   Chest:  clear to auscultation bilaterally without respiratory distress  CVS: regular rate and rhythm  Abdominal: soft, nontender, positive bowel sounds  Extremities: no cyanosis or edema  Skin: warm and dry without rash      Labs:  Results from  last 7 days   Lab Units 04/22/25  0245 04/21/25  0850 04/20/25  2348 04/20/25  1732 04/20/25  0849   WBC 10*3/mm3 4.42 4.58  --   --  5.29   HEMOGLOBIN g/dL 8.2* 8.4* 8.6*   < > 6.6*   HEMATOCRIT % 27.1* 27.6* 27.5*   < > 22.0*   PLATELETS 10*3/mm3 97* 96*  --   --  82*    < > = values in this interval not displayed.         Results from last 7 days   Lab Units 04/22/25  0245 04/21/25  0850 04/20/25  2348 04/20/25  1732 04/20/25  1400   SODIUM mmol/L 138 137  137 139   < >  --    POTASSIUM mmol/L 4.4 4.9  4.9 5.2   < >  --    CHLORIDE mmol/L 101 105  105 106   < >  --    CO2 mmol/L 25.0 17.0*  17.0* 16.0*   < >  --    BUN mg/dL 55* 99*  99* 101*   < >  --    CREATININE mg/dL 3.50* 4.35*  4.35* 4.51*   < >  --    CALCIUM mg/dL 8.3* 8.5*  8.5* 8.8   < >  --    EGFR mL/min/1.73 17.2* 13.3*  13.3* 12.7*   < >  --    BILIRUBIN mg/dL  --  0.2  --   --  0.2   ALK PHOS U/L  --  145*  --   --  176*   ALT (SGPT) U/L  --  10  --   --  10   AST (SGOT) U/L  --  14  --   --  17   GLUCOSE mg/dL 93 107*  107* 138*   < >  --     < > = values in this interval not displayed.       Radiology:   Imaging Results (Last 72 Hours)       Procedure Component Value Units Date/Time    CT Angio Abdominal Aorta Bilateral Iliofem Runoff [[id number]] Collected: 04/21/25 1621     Updated: 04/21/25 1636    Narrative:      EXAM/TECHNIQUE: CT angiography with 3D MIP images abdomen and pelvis  with bilateral lower extremity runoff     INDICATION: aortoiliac disease right lower extremity ischemia;  N18.9-Chronic kidney disease, unspecified; E87.5-Hyperkalemia;  D64.9-Anemia, unspecified; M54.16-Radiculopathy, lumbar region;  I73.9-Peripheral vascular disease, unspecified     COMPARISON: 9/15/2020     DLP: 479.06 mGy.cm. Automated exposure control was utilized to decrease  patient radiation dose.     FINDINGS:     Nonaneurysmal abdominal aorta with heavy atherosclerotic calcification.  Celiac artery SMA, and MIKI are widely patent. Mild  atherosclerotic  narrowing of the left main renal artery. There are 2 renal arteries  bilaterally.     The right common iliac and internal iliac arteries are widely patent.  Focal moderate to severe atherosclerotic stenosis of the right external  iliac artery on series 4 image 155. The right common femoral artery is  widely patent and contains heavy atherosclerotic calcification. Right  profunda is widely patent. Heavy atherosclerotic calcification  throughout the right SFA with multifocal areas of moderate stenosis.  Patent atherosclerotic popliteal artery. Three-vessel right lower  extremity runoff.     The left common iliac and internal iliac artery are widely patent.  Moderate atherosclerotic stenosis of the left external iliac artery on  series 4 image 159. Left common femoral artery is widely patent. Left  profunda is widely patent. Heavy atherosclerotic calcification  throughout the SFA with multifocal areas of proximal stenosis. The left  SFA appears occluded along its mid course (series 4 image 297) with  reconstitution occurring at the distal vessel. After reconstitution,  there is an area of severe left SFA stenosis on series 4 image 359.  Patent left popliteal artery. Three-vessel left lower extremity runoff  appears maintained.     Nonvascular findings:     Moderate right pleural effusion with overlying atelectasis. Trace left  pleural effusion. Cardiomegaly. Liver appears cirrhotic. 9 mm arterial  enhancing lesion in the left liver on image 54. No gallstone or biliary  ductal dilatation. The pancreas, spleen, and adrenal glands are  unremarkable. Multifocal bilateral renal cortical scarring. No  hydronephrosis or large calculi. No focal urinary bladder wall  thickening.      Moderate volume stool throughout the colon. No colonic wall thickening  or pericolonic fat stranding. No small bowel distention or inflammation.     No evidence of ascites. There is diffuse edematous change throughout  the  mesentery/peritoneum. No pelvic mass or collection. Prostate is enlarged  measuring 5.3 cm transverse dimension. No pathologically enlarged  abdominal or pelvic lymph nodes.     Diffuse abdominal wall soft tissue edema. Right hip arthroplasty. Left  femoral head avascular necrosis with areas of subchondral collapse  noted.       Impression:         1.  Nonaneurysmal abdominal aorta with heavy atherosclerotic  calcification throughout.     2.  Focal moderate-severe atherosclerotic stenosis of the right external  iliac artery. Heavy atherosclerotic calcification throughout the right  SFA with multifocal areas of moderate stenosis. Three-vessel right lower  extremity runoff.     3.  Moderate atherosclerotic stenosis of the left external iliac artery.  Heavy atherosclerotic calcification throughout the left SFA with  multifocal moderate stenosis proximally and complete occlusion of the  mid vessel with reconstitution distally. After reconstitution, there is  an area of focal severe left SFA stenosis. Three-vessel left lower  extremity runoff appears maintained.     4.  Moderate right pleural effusion. Cardiomegaly.     5.  Liver appears cirrhotic. 9 mm enhancing lesion in the left liver on  image 54 series 4. Recommend follow-up liver protocol CT or MRI for  further characterization.     6.  Anasarca with diffuse abdominal wall soft tissue edema and extensive  diffuse edematous change throughout the mesentery and peritoneum.     7.  Advanced left femoral head avascular necrosis.           This report was signed and finalized on 4/21/2025 4:33 PM by Dr. Wellington Farley MD.       US Arterial Doppler Lower Extremity Complete [[id number]] Collected: 04/21/25 1419     Updated: 04/21/25 1425    Narrative:      History: PAD     Comments: Grayscale imaging as well as color flow duplex were used to  evaluate bilateral lower extremity arterial systems.     On the right, the peak systolic velocity in the common femoral  artery  measured 31.7 cm/s. In the profunda femoris artery measured 50.3 cm/s.  The proximal SFA appears occluded. The mid SFA measured 20.1 cm/s. In  the distal SFA measured 48.6 cm/s. In the popliteal artery measured 25.3  cm/s. In the posterior tibial artery measured 20.7 cm/s. In the anterior  tibial artery measured 35.1 cm/s.     On the left, the peak systolic velocity in the common femoral artery  measured 103.2 cm/s. In the profunda femoris artery measured 123.3 cm/s.  In the proximal SFA measured 26.5 cm/s. The mid SFA appears occluded. In  the distal SFA measured 27.3 cm/s. In the popliteal artery measured 16.3  cm/s. In the posterior tibial artery measured 55.8 cm/s. In the anterior  tibial artery measured 42.5 cm/s.       Impression:      In the right lower extremity, there is heavy plaque burden  in the common femoral artery with an occluded proximal SFA. There is  reconstitution in the mid SFA but dampened flow throughout the lower  extremity with heavy plaque burden throughout. In the left lower  extremity, there is also heavy plaque burden in the common femoral  artery with an occluded mid SFA. There is reconstitution in the distal  SFA with dampened flow throughout the lower extremity with heavy plaque  burden throughout. Further imaging/evaluation may be warranted.     This report was signed and finalized on 4/21/2025 2:22 PM by Dr. Gil Pineda MD.       US Renal Bilateral [[id number]] Collected: 04/20/25 1717     Updated: 04/20/25 1722    Narrative:      RENAL ULTRASOUND COMPLETE 4/20/2025 3:47 PM     REASON FOR EXAM: Renal Failure; N18.9-Chronic kidney disease,  unspecified; E87.5-Hyperkalemia; D64.9-Anemia, unspecified;  M54.16-Radiculopathy, lumbar region; I73.9-Peripheral vascular disease,  unspecified.     COMPARISON: None.       TECHNIQUE: Multiple longitudinal and transverse realtime sonographic  images of the kidneys and urinary bladder are obtained.     FINDINGS:     RIGHT KIDNEY: The  right kidney measures 9.7 cm in length. The cortical  thickness is normal. There is increased cortical echogenicity. There is  a mid to lower pole cyst measuring 1.7 x 1.4 x 1.6 cm. There is no  hydronephrosis. No nephrolithiasis or abnormal perinephric fluid  collections.     LEFT KIDNEY: The left kidney measures 10.1 cm in length. The cortical  thickness is normal. There is increased cortical echogenicity. There is  a 1 x 0.7 x 0.9 cm cyst in the lower pole. There is no hydronephrosis.  No nephrolithiasis or abnormal perinephric fluid collections.     PELVIS: There is mild thickening of the bladder wall. There is no free  fluid in the pelvis.     ADDITIONAL FINDINGS: The abdominal aorta is normal caliber. There is  atheromatous calcification.       Impression:      1. Increased cortical echogenicity of both kidneys consistent with  medical renal disease. No hydronephrosis.  2. Bilateral renal cysts.  3. Mild wall thickening of the urinary bladder which may be related to  muscular hypertrophy. Acute and chronic inflammation and infection are  also included in the differential. Neoplasm less likely.  4. Atheromatous abdominal aorta.           The images and report are stored on PAC's per institutional and state  guidelines.           This report was signed and finalized on 4/20/2025 5:19 PM by Dr. Mikael Gallegos MD.       XR Chest 1 View [[id number]] Collected: 04/20/25 1155     Updated: 04/20/25 1159    Narrative:      XR CHEST 1 VW- 4/20/2025 10:40 AM     HISTORY: Preop; N18.9-Chronic kidney disease, unspecified;  E87.5-Hyperkalemia; D64.9-Anemia, unspecified; M54.16-Radiculopathy,  lumbar region; I73.9-Peripheral vascular disease, unspecified       COMPARISON: Chest x-ray dated 4/13/2025     FINDINGS:  Upright frontal radiograph of the chest was obtained     Bilateral interstitial coarsening with subpleural lines reflecting  interstitial edema. Trace left-sided and small to moderate sided right  pleural  effusions. No consolidation or pneumothorax. Prior coronary  bypass. Right IJ dual-lumen central venous catheter. No acute bony  abnormality.       Impression:      1.  Volume overload with interstitial edema and bilateral pleural  effusions as detailed above.     This report was signed and finalized on 4/20/2025 11:56 AM by Dr Tyrell Knox.       CT Lumbar Spine Without Contrast [[id number]] Collected: 04/20/25 0947     Updated: 04/20/25 0953    Narrative:      CT LUMBAR SPINE WO CONTRAST- 4/20/2025 8:24 AM     HISTORY: Right-sided lumbar radiculopathy     COMPARISON: None      DOSE LENGTH PRODUCT: 439.68 mGy.cm. Automated exposure control was also  utilized to decrease patient radiation dose.     TECHNIQUE: Serial helical tomographic images of the lumbar spine were  obtained without the use of intravenous contrast. Additionally, sagittal  and coronal reformatted images were provided for review. Automated  exposure control is utilized to reduce patient radiation dose.     FINDINGS:     Counting will assume 5 lumbar-type vertebrae. The spine is imaged from  T12 to S3.     There is no visualized acute fracture or traumatic subluxation. Lumbar  lordosis is maintained. Vertebral body heights are well maintained.  Multilevel loss of intervertebral disc height, L5-S1 in particular. No  high-grade bony narrowing of the spinal canal. Advanced facet  arthropathy. Posterior elements are intact. Neuroforaminal narrowing at  multiple levels, especially L5-S1 on the right. There is a layering  right-sided pleural effusion. Atheromatous vascular calcification along  the abdominal aorta. Incidentally noted sacral canal Tarlov cysts.  Included portion of the bony pelvis is intact.       Impression:      1. No acute osseous injury or malalignment.   2. Underlying advanced lumbar spine osteoarthritis changes including a  high-grade right-sided neuroforaminal narrowing at L5-S1.  3. Layering right-sided pleural effusion.          "  This report was signed and finalized on 4/20/2025 9:50 AM by Dr Tyrell Knox.               Culture:  No results found for: \"BLOODCX\", \"URINECX\", \"WOUNDCX\", \"MRSACX\", \"RESPCX\", \"STOOLCX\"      Assessment    Chronic kidney disease with progression to end stage renal disease  Hypertension  Hyperkalemia  Severe anemia--s/p PRBC transfusion on 4/20  Secondary hyperparathyroidism  Metabolic acidosis    Plan:   Dialysis today  Planning for HD again tomorrow  Outpatient HD chair time arranged. Will be starting Friday, 4/25 at [facility] clinic      Slava Tate, APRN  4/22/2025  10:28 CDT    "

## 2025-04-23 LAB
ANION GAP SERPL CALCULATED.3IONS-SCNC: 10 MMOL/L (ref 5–15)
BASOPHILS # BLD AUTO: 0.1 10*3/MM3 (ref 0–0.2)
BASOPHILS NFR BLD AUTO: 1.7 % (ref 0–1.5)
BUN SERPL-MCNC: 44 MG/DL (ref 8–23)
BUN/CREAT SERPL: 15.7 (ref 7–25)
CALCIUM SPEC-SCNC: 8.3 MG/DL (ref 8.6–10.5)
CHLORIDE SERPL-SCNC: 99 MMOL/L (ref 98–107)
CO2 SERPL-SCNC: 27 MMOL/L (ref 22–29)
CREAT SERPL-MCNC: 2.8 MG/DL (ref 0.76–1.27)
DEPRECATED RDW RBC AUTO: 71.2 FL (ref 37–54)
EGFRCR SERPLBLD CKD-EPI 2021: 22.5 ML/MIN/1.73
EOSINOPHIL # BLD AUTO: 0.45 10*3/MM3 (ref 0–0.4)
EOSINOPHIL NFR BLD AUTO: 7.4 % (ref 0.3–6.2)
ERYTHROCYTE [DISTWIDTH] IN BLOOD BY AUTOMATED COUNT: 19 % (ref 12.3–15.4)
GLUCOSE SERPL-MCNC: 100 MG/DL (ref 65–99)
HCT VFR BLD AUTO: 30.9 % (ref 37.5–51)
HGB BLD-MCNC: 9.6 G/DL (ref 13–17.7)
IMM GRANULOCYTES # BLD AUTO: 0.03 10*3/MM3 (ref 0–0.05)
IMM GRANULOCYTES NFR BLD AUTO: 0.5 % (ref 0–0.5)
LYMPHOCYTES # BLD AUTO: 1.49 10*3/MM3 (ref 0.7–3.1)
LYMPHOCYTES NFR BLD AUTO: 24.6 % (ref 19.6–45.3)
MCH RBC QN AUTO: 31.3 PG (ref 26.6–33)
MCHC RBC AUTO-ENTMCNC: 31.1 G/DL (ref 31.5–35.7)
MCV RBC AUTO: 100.7 FL (ref 79–97)
MONOCYTES # BLD AUTO: 0.76 10*3/MM3 (ref 0.1–0.9)
MONOCYTES NFR BLD AUTO: 12.5 % (ref 5–12)
NEUTROPHILS NFR BLD AUTO: 3.23 10*3/MM3 (ref 1.7–7)
NEUTROPHILS NFR BLD AUTO: 53.3 % (ref 42.7–76)
NRBC BLD AUTO-RTO: 0 /100 WBC (ref 0–0.2)
PLATELET # BLD AUTO: 124 10*3/MM3 (ref 140–450)
PMV BLD AUTO: 9.9 FL (ref 6–12)
POTASSIUM SERPL-SCNC: 4.1 MMOL/L (ref 3.5–5.2)
RBC # BLD AUTO: 3.07 10*6/MM3 (ref 4.14–5.8)
SODIUM SERPL-SCNC: 136 MMOL/L (ref 136–145)
WBC NRBC COR # BLD AUTO: 6.06 10*3/MM3 (ref 3.4–10.8)

## 2025-04-23 PROCEDURE — 99232 SBSQ HOSP IP/OBS MODERATE 35: CPT | Performed by: NURSE PRACTITIONER

## 2025-04-23 PROCEDURE — 25010000002 HEPARIN (PORCINE) PER 1000 UNITS: Performed by: INTERNAL MEDICINE

## 2025-04-23 PROCEDURE — 80048 BASIC METABOLIC PNL TOTAL CA: CPT | Performed by: NURSE PRACTITIONER

## 2025-04-23 PROCEDURE — 85025 COMPLETE CBC W/AUTO DIFF WBC: CPT | Performed by: NURSE PRACTITIONER

## 2025-04-23 RX ORDER — HEPARIN SODIUM 1000 [USP'U]/ML
3600 INJECTION, SOLUTION INTRAVENOUS; SUBCUTANEOUS AS NEEDED
Status: DISCONTINUED | OUTPATIENT
Start: 2025-04-23 | End: 2025-04-24

## 2025-04-23 RX ADMIN — HYDRALAZINE HYDROCHLORIDE 100 MG: 50 TABLET ORAL at 20:01

## 2025-04-23 RX ADMIN — MESALAMINE 1.5 G: 375 CAPSULE, EXTENDED RELEASE ORAL at 10:00

## 2025-04-23 RX ADMIN — ATORVASTATIN CALCIUM 10 MG: 10 TABLET, FILM COATED ORAL at 10:01

## 2025-04-23 RX ADMIN — Medication 10 MG: at 20:01

## 2025-04-23 RX ADMIN — HEPARIN SODIUM 3600 UNITS: 1000 INJECTION, SOLUTION INTRAVENOUS; SUBCUTANEOUS at 17:15

## 2025-04-23 RX ADMIN — ASPIRIN 81 MG: 81 TABLET, COATED ORAL at 10:01

## 2025-04-23 RX ADMIN — FUROSEMIDE 40 MG: 40 TABLET ORAL at 20:01

## 2025-04-23 RX ADMIN — ACETAMINOPHEN 1000 MG: 500 TABLET, FILM COATED ORAL at 13:41

## 2025-04-23 RX ADMIN — SODIUM BICARBONATE 650 MG: 650 TABLET ORAL at 20:03

## 2025-04-23 RX ADMIN — LEVOTHYROXINE SODIUM 100 MCG: 100 TABLET ORAL at 06:09

## 2025-04-23 RX ADMIN — CLOPIDOGREL BISULFATE 75 MG: 75 TABLET, FILM COATED ORAL at 10:01

## 2025-04-23 RX ADMIN — SODIUM BICARBONATE 650 MG: 650 TABLET ORAL at 18:23

## 2025-04-23 RX ADMIN — TRAMADOL HYDROCHLORIDE 50 MG: 50 TABLET, COATED ORAL at 06:10

## 2025-04-23 RX ADMIN — ALPRAZOLAM 0.25 MG: 0.25 TABLET ORAL at 20:01

## 2025-04-23 RX ADMIN — HYDRALAZINE HYDROCHLORIDE 100 MG: 50 TABLET ORAL at 18:23

## 2025-04-23 RX ADMIN — CALCITRIOL CAPSULES 0.25 MCG 0.5 MCG: 0.25 CAPSULE ORAL at 10:01

## 2025-04-23 RX ADMIN — ISOSORBIDE DINITRATE 10 MG: 10 TABLET ORAL at 18:23

## 2025-04-23 RX ADMIN — CARVEDILOL 25 MG: 25 TABLET, FILM COATED ORAL at 20:01

## 2025-04-23 RX ADMIN — TAMSULOSIN HYDROCHLORIDE 0.4 MG: 0.4 CAPSULE ORAL at 20:00

## 2025-04-23 RX ADMIN — SODIUM BICARBONATE 650 MG: 650 TABLET ORAL at 10:01

## 2025-04-23 RX ADMIN — CLONIDINE HYDROCHLORIDE 0.3 MG: 0.1 TABLET ORAL at 20:01

## 2025-04-23 RX ADMIN — ISOSORBIDE DINITRATE 10 MG: 10 TABLET ORAL at 10:01

## 2025-04-23 RX ADMIN — TRAMADOL HYDROCHLORIDE 50 MG: 50 TABLET, COATED ORAL at 18:25

## 2025-04-23 RX ADMIN — CETIRIZINE HYDROCHLORIDE 10 MG: 10 TABLET, FILM COATED ORAL at 10:01

## 2025-04-23 RX ADMIN — MONTELUKAST SODIUM 10 MG: 10 TABLET, COATED ORAL at 20:01

## 2025-04-23 RX ADMIN — CLONIDINE HYDROCHLORIDE 0.3 MG: 0.1 TABLET ORAL at 15:47

## 2025-04-23 RX ADMIN — PANTOPRAZOLE SODIUM 40 MG: 40 TABLET, DELAYED RELEASE ORAL at 06:10

## 2025-04-23 RX ADMIN — SODIUM BICARBONATE 650 MG: 650 TABLET ORAL at 06:09

## 2025-04-23 NOTE — NURSING NOTE
Pt hemodialysis cath was leaking underneath the dressing, per vascular access this nurse waited for dressing change to minimize skin breakdown. Upon further examination a few hours later, I noticed that it was leaking under the dressing onto the patient's chest. This nurse called the ICU to ask for a dressing change to be performed and was confirmed that it would be done. Dressing is currently still not changed.

## 2025-04-23 NOTE — PROGRESS NOTES
Patient Name: Ricardo Hugo  Date of Admission: 4/20/2025  Today's Date: 04/23/25  Length of Stay: 3  Primary Care Physician: Nathan Zimmer MD    Subjective   Chief Complaint: Right lower extremity pain  HPI   Today: Up in chair.  Looks significantly improved over the past few days.  He does have oozing at right permacath site.  Daughter states this has been occurring constantly with at least 7/8 dressing changes.  I will notify Dr. Pineda to evaluate.  Patient currently without pain.  Condition significantly improved.  Based on angiogram results tomorrow he may be discharged.  Patient and daughter verbalized understanding.      Documented weights    04/20/25 0750 04/21/25 0501 04/22/25 0453 04/23/25 0600   Weight: 60.3 kg (133 lb) 67.5 kg (148 lb 12.8 oz) 61.6 kg (135 lb 14.4 oz) 60.9 kg (134 lb 3.2 oz)        Intake/Output Summary (Last 24 hours) at 4/23/2025 1758  Last data filed at 4/23/2025 0612  Gross per 24 hour   Intake --   Output 300 ml   Net -300 ml       Results for orders placed during the hospital encounter of 01/10/25    Adult Transthoracic Echo Complete w/ Color, Spectral and Contrast if Necessary Per Protocol    Interpretation Summary    Left ventricular systolic function is normal. Left ventricular ejection fraction appears to be 56 - 60%.    Left ventricular wall thickness is consistent with mild septal asymmetric hypertrophy.    Left ventricular diastolic function is consistent with (grade II w/high LAP) pseudonormalization.    Normal right ventricular cavity size and systolic function noted.    The left atrial cavity is mild to moderately dilated.    The right atrial cavity is dilated.    Mild aortic valve stenosis is present.    Mild to moderate mitral valve regurgitation is present.    Moderate to severe tricuspid valve regurgitation is present.    Estimated right ventricular systolic pressure from tricuspid regurgitation is markedly elevated (>55 mmHg).       Review of Systems   All  pertinent negatives and positives are as above. All other systems have been reviewed and are negative unless otherwise stated.     Objective    Temp:  [97.4 °F (36.3 °C)-99 °F (37.2 °C)] 97.8 °F (36.6 °C)  Heart Rate:  [47-73] 56  Resp:  [16] 16  BP: (134-176)/(40-53) 173/53  Physical Exam  Vitals and nursing note reviewed.   Constitutional:       Appearance: He is ill-appearing (Chronic).   HENT:      Head: Normocephalic and atraumatic.      Mouth/Throat:      Mouth: Mucous membranes are moist.      Pharynx: Oropharynx is clear.   Eyes:      Extraocular Movements: Extraocular movements intact.      Conjunctiva/sclera: Conjunctivae normal.   Cardiovascular:      Rate and Rhythm: Normal rate and regular rhythm.      Heart sounds: Murmur heard.   Pulmonary:      Effort: Pulmonary effort is normal.      Breath sounds: Normal breath sounds.   Abdominal:      General: There is no distension.      Palpations: Abdomen is soft.   Musculoskeletal:      Cervical back: Normal range of motion and neck supple.      Right lower leg: No edema.      Left lower leg: No edema.   Skin:     General: Skin is warm and dry.      Comments: Bilateral lower extremities dusky, Multiple bruises scattered.  Oozing blood noted at right permacath site-did not remove dressing to evaluate.  Nurses have been changing frequently   Neurological:      General: No focal deficit present.      Mental Status: He is alert and oriented to person, place, and time.   Psychiatric:         Mood and Affect: Mood normal.         Behavior: Behavior normal.       Results Review:  I have reviewed the labs, radiology results, and diagnostic studies.    Laboratory Data:   Results from last 7 days   Lab Units 04/23/25  0619 04/22/25  0245 04/21/25  0850   WBC 10*3/mm3 6.06 4.42 4.58   HEMOGLOBIN g/dL 9.6* 8.2* 8.4*   HEMATOCRIT % 30.9* 27.1* 27.6*   PLATELETS 10*3/mm3 124* 97* 96*        Results from last 7 days   Lab Units 04/23/25  0619 04/22/25  0245 04/21/25  0850  04/20/25  1732 04/20/25  1400   SODIUM mmol/L 136 138 137  137   < >  --    POTASSIUM mmol/L 4.1 4.4 4.9  4.9   < >  --    CHLORIDE mmol/L 99 101 105  105   < >  --    CO2 mmol/L 27.0 25.0 17.0*  17.0*   < >  --    BUN mg/dL 44* 55* 99*  99*   < >  --    CREATININE mg/dL 2.80* 3.50* 4.35*  4.35*   < >  --    CALCIUM mg/dL 8.3* 8.3* 8.5*  8.5*   < >  --    BILIRUBIN mg/dL  --   --  0.2  --  0.2   ALK PHOS U/L  --   --  145*  --  176*   ALT (SGPT) U/L  --   --  10  --  10   AST (SGOT) U/L  --   --  14  --  17   GLUCOSE mg/dL 100* 93 107*  107*   < >  --     < > = values in this interval not displayed.       Culture Data:     Microbiology Results (last 10 days)       Procedure Component Value - Date/Time    Eosinophil Smear - Urine, Urine, Clean Catch [[id number]]  (Normal) Collected: 04/20/25 1509    Lab Status: Final result Specimen: Urine, Clean Catch Updated: 04/21/25 0020     Eosinophil Smear 0 % EOS/100 Cells              Radiology Data:   Imaging Results (Last 7 Days)       Procedure Component Value Units Date/Time    CT Angio Abdominal Aorta Bilateral Iliofem Runoff [[id number]] Collected: 04/21/25 1621     Updated: 04/21/25 1636    Narrative:      EXAM/TECHNIQUE: CT angiography with 3D MIP images abdomen and pelvis  with bilateral lower extremity runoff     INDICATION: aortoiliac disease right lower extremity ischemia;  N18.9-Chronic kidney disease, unspecified; E87.5-Hyperkalemia;  D64.9-Anemia, unspecified; M54.16-Radiculopathy, lumbar region;  I73.9-Peripheral vascular disease, unspecified     COMPARISON: 9/15/2020     DLP: 479.06 mGy.cm. Automated exposure control was utilized to decrease  patient radiation dose.     FINDINGS:     Nonaneurysmal abdominal aorta with heavy atherosclerotic calcification.  Celiac artery SMA, and MIKI are widely patent. Mild atherosclerotic  narrowing of the left main renal artery. There are 2 renal arteries  bilaterally.     The right common iliac and internal iliac  arteries are widely patent.  Focal moderate to severe atherosclerotic stenosis of the right external  iliac artery on series 4 image 155. The right common femoral artery is  widely patent and contains heavy atherosclerotic calcification. Right  profunda is widely patent. Heavy atherosclerotic calcification  throughout the right SFA with multifocal areas of moderate stenosis.  Patent atherosclerotic popliteal artery. Three-vessel right lower  extremity runoff.     The left common iliac and internal iliac artery are widely patent.  Moderate atherosclerotic stenosis of the left external iliac artery on  series 4 image 159. Left common femoral artery is widely patent. Left  profunda is widely patent. Heavy atherosclerotic calcification  throughout the SFA with multifocal areas of proximal stenosis. The left  SFA appears occluded along its mid course (series 4 image 297) with  reconstitution occurring at the distal vessel. After reconstitution,  there is an area of severe left SFA stenosis on series 4 image 359.  Patent left popliteal artery. Three-vessel left lower extremity runoff  appears maintained.     Nonvascular findings:     Moderate right pleural effusion with overlying atelectasis. Trace left  pleural effusion. Cardiomegaly. Liver appears cirrhotic. 9 mm arterial  enhancing lesion in the left liver on image 54. No gallstone or biliary  ductal dilatation. The pancreas, spleen, and adrenal glands are  unremarkable. Multifocal bilateral renal cortical scarring. No  hydronephrosis or large calculi. No focal urinary bladder wall  thickening.      Moderate volume stool throughout the colon. No colonic wall thickening  or pericolonic fat stranding. No small bowel distention or inflammation.     No evidence of ascites. There is diffuse edematous change throughout the  mesentery/peritoneum. No pelvic mass or collection. Prostate is enlarged  measuring 5.3 cm transverse dimension. No pathologically enlarged  abdominal or  pelvic lymph nodes.     Diffuse abdominal wall soft tissue edema. Right hip arthroplasty. Left  femoral head avascular necrosis with areas of subchondral collapse  noted.       Impression:         1.  Nonaneurysmal abdominal aorta with heavy atherosclerotic  calcification throughout.     2.  Focal moderate-severe atherosclerotic stenosis of the right external  iliac artery. Heavy atherosclerotic calcification throughout the right  SFA with multifocal areas of moderate stenosis. Three-vessel right lower  extremity runoff.     3.  Moderate atherosclerotic stenosis of the left external iliac artery.  Heavy atherosclerotic calcification throughout the left SFA with  multifocal moderate stenosis proximally and complete occlusion of the  mid vessel with reconstitution distally. After reconstitution, there is  an area of focal severe left SFA stenosis. Three-vessel left lower  extremity runoff appears maintained.     4.  Moderate right pleural effusion. Cardiomegaly.     5.  Liver appears cirrhotic. 9 mm enhancing lesion in the left liver on  image 54 series 4. Recommend follow-up liver protocol CT or MRI for  further characterization.     6.  Anasarca with diffuse abdominal wall soft tissue edema and extensive  diffuse edematous change throughout the mesentery and peritoneum.     7.  Advanced left femoral head avascular necrosis.           This report was signed and finalized on 4/21/2025 4:33 PM by Dr. Wellington Farley MD.       US Arterial Doppler Lower Extremity Complete [[id number]] Collected: 04/21/25 1419     Updated: 04/21/25 1425    Narrative:      History: PAD     Comments: Grayscale imaging as well as color flow duplex were used to  evaluate bilateral lower extremity arterial systems.     On the right, the peak systolic velocity in the common femoral artery  measured 31.7 cm/s. In the profunda femoris artery measured 50.3 cm/s.  The proximal SFA appears occluded. The mid SFA measured 20.1 cm/s. In  the distal  SFA measured 48.6 cm/s. In the popliteal artery measured 25.3  cm/s. In the posterior tibial artery measured 20.7 cm/s. In the anterior  tibial artery measured 35.1 cm/s.     On the left, the peak systolic velocity in the common femoral artery  measured 103.2 cm/s. In the profunda femoris artery measured 123.3 cm/s.  In the proximal SFA measured 26.5 cm/s. The mid SFA appears occluded. In  the distal SFA measured 27.3 cm/s. In the popliteal artery measured 16.3  cm/s. In the posterior tibial artery measured 55.8 cm/s. In the anterior  tibial artery measured 42.5 cm/s.       Impression:      In the right lower extremity, there is heavy plaque burden  in the common femoral artery with an occluded proximal SFA. There is  reconstitution in the mid SFA but dampened flow throughout the lower  extremity with heavy plaque burden throughout. In the left lower  extremity, there is also heavy plaque burden in the common femoral  artery with an occluded mid SFA. There is reconstitution in the distal  SFA with dampened flow throughout the lower extremity with heavy plaque  burden throughout. Further imaging/evaluation may be warranted.     This report was signed and finalized on 4/21/2025 2:22 PM by Dr. Gil Pineda MD.        Renal Bilateral [[id number]] Collected: 04/20/25 1717     Updated: 04/20/25 1722    Narrative:      RENAL ULTRASOUND COMPLETE 4/20/2025 3:47 PM     REASON FOR EXAM: Renal Failure; N18.9-Chronic kidney disease,  unspecified; E87.5-Hyperkalemia; D64.9-Anemia, unspecified;  M54.16-Radiculopathy, lumbar region; I73.9-Peripheral vascular disease,  unspecified.     COMPARISON: None.       TECHNIQUE: Multiple longitudinal and transverse realtime sonographic  images of the kidneys and urinary bladder are obtained.     FINDINGS:     RIGHT KIDNEY: The right kidney measures 9.7 cm in length. The cortical  thickness is normal. There is increased cortical echogenicity. There is  a mid to lower pole cyst measuring  1.7 x 1.4 x 1.6 cm. There is no  hydronephrosis. No nephrolithiasis or abnormal perinephric fluid  collections.     LEFT KIDNEY: The left kidney measures 10.1 cm in length. The cortical  thickness is normal. There is increased cortical echogenicity. There is  a 1 x 0.7 x 0.9 cm cyst in the lower pole. There is no hydronephrosis.  No nephrolithiasis or abnormal perinephric fluid collections.     PELVIS: There is mild thickening of the bladder wall. There is no free  fluid in the pelvis.     ADDITIONAL FINDINGS: The abdominal aorta is normal caliber. There is  atheromatous calcification.       Impression:      1. Increased cortical echogenicity of both kidneys consistent with  medical renal disease. No hydronephrosis.  2. Bilateral renal cysts.  3. Mild wall thickening of the urinary bladder which may be related to  muscular hypertrophy. Acute and chronic inflammation and infection are  also included in the differential. Neoplasm less likely.  4. Atheromatous abdominal aorta.           The images and report are stored on PAC's per institutional and state  guidelines.           This report was signed and finalized on 4/20/2025 5:19 PM by Dr. Mikael Gallegos MD.       XR Chest 1 View [[id number]] Collected: 04/20/25 1155     Updated: 04/20/25 1159    Narrative:      XR CHEST 1 VW- 4/20/2025 10:40 AM     HISTORY: Preop; N18.9-Chronic kidney disease, unspecified;  E87.5-Hyperkalemia; D64.9-Anemia, unspecified; M54.16-Radiculopathy,  lumbar region; I73.9-Peripheral vascular disease, unspecified       COMPARISON: Chest x-ray dated 4/13/2025     FINDINGS:  Upright frontal radiograph of the chest was obtained     Bilateral interstitial coarsening with subpleural lines reflecting  interstitial edema. Trace left-sided and small to moderate sided right  pleural effusions. No consolidation or pneumothorax. Prior coronary  bypass. Right IJ dual-lumen central venous catheter. No acute bony  abnormality.       Impression:      1.   Volume overload with interstitial edema and bilateral pleural  effusions as detailed above.     This report was signed and finalized on 4/20/2025 11:56 AM by Dr Tyrell Knox.       CT Lumbar Spine Without Contrast [[id number]] Collected: 04/20/25 0947     Updated: 04/20/25 0953    Narrative:      CT LUMBAR SPINE WO CONTRAST- 4/20/2025 8:24 AM     HISTORY: Right-sided lumbar radiculopathy     COMPARISON: None      DOSE LENGTH PRODUCT: 439.68 mGy.cm. Automated exposure control was also  utilized to decrease patient radiation dose.     TECHNIQUE: Serial helical tomographic images of the lumbar spine were  obtained without the use of intravenous contrast. Additionally, sagittal  and coronal reformatted images were provided for review. Automated  exposure control is utilized to reduce patient radiation dose.     FINDINGS:     Counting will assume 5 lumbar-type vertebrae. The spine is imaged from  T12 to S3.     There is no visualized acute fracture or traumatic subluxation. Lumbar  lordosis is maintained. Vertebral body heights are well maintained.  Multilevel loss of intervertebral disc height, L5-S1 in particular. No  high-grade bony narrowing of the spinal canal. Advanced facet  arthropathy. Posterior elements are intact. Neuroforaminal narrowing at  multiple levels, especially L5-S1 on the right. There is a layering  right-sided pleural effusion. Atheromatous vascular calcification along  the abdominal aorta. Incidentally noted sacral canal Tarlov cysts.  Included portion of the bony pelvis is intact.       Impression:      1. No acute osseous injury or malalignment.   2. Underlying advanced lumbar spine osteoarthritis changes including a  high-grade right-sided neuroforaminal narrowing at L5-S1.  3. Layering right-sided pleural effusion.           This report was signed and finalized on 4/20/2025 9:50 AM by Dr Tyrell Knox.                I have reviewed the patient's current medications.     ALPRAZolam, 0.25 mg,  Oral, Nightly  aspirin, 81 mg, Oral, Daily  atorvastatin, 10 mg, Oral, Daily  calcitriol, 0.5 mcg, Oral, Daily  carvedilol, 25 mg, Oral, Q12H  cetirizine, 10 mg, Oral, Daily  cloNIDine, 0.3 mg, Oral, TID  cloNIDine, 1 patch, Transdermal, Weekly  clopidogrel, 75 mg, Oral, Daily  furosemide, 40 mg, Oral, BID  hydrALAZINE, 100 mg, Oral, TID  isosorbide dinitrate, 10 mg, Oral, TID - Nitrates  levothyroxine, 100 mcg, Oral, Q AM  melatonin, 10 mg, Oral, Nightly  mesalamine, 1.5 g, Oral, Daily  montelukast, 10 mg, Oral, Nightly  NIFEdipine XL, 120 mg, Oral, Daily  pantoprazole, 40 mg, Oral, Q AM  sodium bicarbonate, 650 mg, Oral, 5x Daily  tamsulosin, 0.4 mg, Oral, Nightly       Assessment/Plan   Assessment  Active Hospital Problems    Diagnosis     **Anemia, multifactorial to include chronic kidney disease, iron deficiency and possible bleed     Acute pain of lower extremity, right     Hyperkalemia     CKD (chronic kidney disease) stage 5     Resistant hypertension     PAD (peripheral artery disease)    Oozing at right permacath site    Treatment Plan  1.  Chronic kidney disease stage V/end-stage renal disease, nephrology following, patient had dialysis today and 2000 mL was removed, plans for HD again tomorrow, outpatient HD chair time arranged for Monday Wednesday Friday at [facility] clinic however family notified today that patient would need to proceed with Tuesday/Thursday/Saturday treatments due to problems with chair availability.     2.  Anemia multifactorial; check anemia studies, transfused 1 unit packed red blood cells on 4/19, Hgb stable, labs in a.m.     3.  Hyperkalemia; Lokelma 10 g 3 times daily today per nephrology, dialysis today, resolved, nephrology treating, labs in the a.m.     4.  Resistant hypertension: Home medications reviewed and restarted, monitor per protocol     5.  Acute pain right lower extremity; due to tolerance to Lortab will give acetaminophen 1 g IV every 4 hours as needed,  restarted home ultram, vascular surgery following-plans for arteriogram tomorrow     DVT prophylaxis with SCDs     Medical Decision Making  Number and Complexity of problems: 5  Chronic kidney disease stage V/end-stage renal disease acute on chronic, high complexity, unchanged  Anemia, acute on chronic, high complexity, worsening  Hyperkalemia, acute, high complexity, unchanged   Resistant hypertension, chronic, high complexity, stable  Pain right lower extremity, acute, high complexity, resolved     Differential Diagnosis: None     Conditions and Status        Condition is unchanged.     MDM Data  External documents reviewed: No  Cardiac tracing (EKG, telemetry) interpretation: Reviewed  Radiology interpretation: Reviewed  Labs reviewed: Yes  Any tests that were considered but not ordered: No     Decision rules/scores evaluated (example [id number], Wells, etc): No     Discussed with: Patient and Dr. Rodriguez     Care Planning  Shared decision making: Patient, daughter and Dr. Rodriguez  Code status and discussions: DNR/DNI     Disposition  Social Determinants of Health that impact treatment or disposition: No  Estimated length of stay is 2+ days.      I confirmed that the patient's advanced care plan is present, code status is documented, and a surrogate decision maker is listed in the patient's medical record.      The patient's surrogate decision maker is daughter.     Electronically signed by SHELLY Grant, 04/23/25, 17:58 CDT.

## 2025-04-23 NOTE — PLAN OF CARE
Goal Outcome Evaluation:  Plan of Care Reviewed With: patient        Progress: no change  Outcome Evaluation: Pt resting well through the shift. Voiding via urinal. Up ad annalisa. C/o pain to RLE, see MAR. Permcath dressing soiled, see nursing note. Dialysis today. Safety maintained.

## 2025-04-23 NOTE — NURSING NOTE
[facility] [place]ALYSIS TREATMENT SUMMARY     Note: Consult with the attending physician for patient treatment orders, this document is not a physician order.     Patient Information  Patient Ricardo Hugo  Date of Birth February 22, 1950  Chart Number [id number]  Location [facility]  Location MRN [id number]  Gender Male  SSN (last 4)   Treatment Information  Treatment Type Hemodialysis  Treatment Id [id number]  Start Time April 23, 2025 13:59  End Time April 23, 2025 17:31  Acutal Duration 03:32  Treatment Balances  Total Saline Administered 500  Net Fluid Balance -1500  Hemodialysis Orders  Therapy Standard  Orders Verified Time 04/23/2025 07:00   Date Verified 04/23/2025  Duration 3:30  Isolated UF/Bypass No  BFR (mL) 400  DFR (mL) 700  Dialyzer Type OPTIFLUX 160NR  UF Order UF Goal Ordered  UF Goal Ordered (mL) 1500  As Tolerated Yes  Crit-Line used No  Heparin Initial Units Bolus No  Heparin IV Maintenance Bolus No  Heparin IV Infusion No  Potassium (mEq/L) 3.0  Calcium (mEq/L) 2.5  Bicarb (mg/dL) 33  Sodium (mEq/L) 140  Clinician Erika Hines RN  Dialysis Access  Access Type Central Venous Catheter  Central Venous Catheter  Access Type Catheter - Tunneled  Access Location Chest Wall - R  Current/New [facility] Patient Yes  1st Use Catheter Verified by Previous Use  Catheter Care Completed per Policy Yes  Dressing Dry and Intact on Arrival No - Dressing Saturated  Dressing Changed Yes  Type of Dressing Film Biopatch/CHG  Dressing Changed By [facility] Staff  Type of HD Caps Curos  HD Caps Changed Yes  CVC Line Education Provided Yes - Dressing Change Frequency  Comments/Notes CVC saturated; new dressing applied.  Vitals  Pre-Treatment Vitals  Time Is BP being recorded? Pre BP Map BP Method Pulse RR Temp How was Weight Obtained? Pre Weight Previous Dry Weight Previous Post Weight Metric Target Fluid Removal (mL) Dialysate Confirmed Clinician  04/23/2025  13:58 BP/Map 147/53 (84) Noninvasive 67 16 97.9 How Obtained: Bed Scale 60.9 - - Kgs 2000 Yes Erika Hines, RN  Comments: Patient treatment in dialysis suite. Patient alert, talking to HD RN. Patient denies pain or distress at this time.  Intra Procedure Vitals  Rec Type Time Is BP being recorded? BP Map Pulse RR BFR DFR AP  TMP UFR RMVD Bolus NSS Flush Chills Safety Check Crit-Line HCT Crit-Line BV% Clinician  E 04/23/2025 13:59 BP/Map 189/53 (98) 61 16 400 700 -160 120 40 570 0 Yes 250  Yes   Erika Hines, RN  Comments: HD started via (R) IJ perm catheter, venous clips applied, prime given, lines secured. Patient tolerated well. Treatment in progress. machine alarms passed, DFR at ordered rate.  E 04/23/2025 14:30 BP/Map 179/65 (103) 56 16 400 700 -160 120 50 570 283    Yes   Kristan Beck RN  Comments: pt awake and alert. on computer. vital signs stable. no complications.  E 04/23/2025 15:00 BP/Map 192/64 (107) 61 16 300 700 -110 90 60 570 564    Yes   Kristan Beck RN  Comments: bfr reduced to 300 due to multiple high arterial pressure alarms. vitals stable. hypertensive.  E 04/23/2025 15:30 BP/Map 190/116 (141) 70 16 300 700 -110 80 60 570 847    Yes   Kristan Beck RN  Comments: pt awake and alert. vitals stable. -hypertensive, asymptomatic. Floor RN called to bring down anti-hypertensive medication. access/lines visible and secure.  E 04/23/2025 16:00 BP/Map 195/62 (106) 68 16 300 700 -110 80 60 570 1130    Yes   Kristan Beck RN  Comments: pt received clonidine from floor rn. pt awake and alert, on computer. vitals stable.  E 04/23/2025 16:30 BP/Map 178/91 (120) 60 16 300 700 -110 90 60 570 1444    Yes   Kristan Beck RN  Comments: pt awake and alert. on computer. vitals stable. access/lines visible and secure.  E 04/23/2025 17:00 BP/Map 159/77 (104) 65 16 300 700 -110 90 14 759 1823    Yes   Kristan Beck RN  Comments: pt awake and alert, on computer. vitals stable. no complications.  E 04/23/2025  17:31 BP/Map 159/77 (104) 65 17       2000 Yes 250  Yes   Erika Hines RN  Comments: Tx complete, blood returned.  Post Treatment Vitals  Time Is BP being recorded? BP Map Pulse RR Temp How was Weight Obtained? Post Weight Metric BVP UF Goal Ordered NSS Given Intra-Procedure Total Machine UF Removed (mL) Crit-Line Ending Profile Crit-Line Refill Crit-Line Ending HCT Crit-Line Max BV% Clinician  04/23/2025 17:48 BP/Map 183/91 (122) 52 17 97.8 How Obtained: Bed scale 59.4 Kgs 63 6659 147 4091     Erika Hines RN  Comments: Tolerated tx well today.  Safety checks include: access uncovered and secured, Hemaclip secured for all central line access, machine checks performed, and alarm limits confirmed.  Labs  Hepatitis  HBsAG Lab Result HBsAG Lab Result HBsAG Draw Date Transient Antigenemia(MD Diagnosis Only) Anti-HBs Lab Result Anti-HBs Lab Value Anti-HBs Draw Date Documented By Documented Date Hepatitis Status Hepatitis Status  Negative  04/20/2025  Negative  04/20/2025 Erika Hines 04/23/2025  Susceptible  Notes:   Pre-Treatment Hepatitis Precautions Copy of hepatitis results verified in hospital EMR Yes Signing  Patient tx outside buffer zone Yes Hepatitis Information Entered By Erika Hines RN Signed By Erika Hines RN  Pre-Treatment Handoff  Staff Report Received Yes  Report Given by Primary Nurse Serenity Rivera RN  Time 13:20  Patient Arrival  Patient ID Verified Date of Birth  Full Name  Patient Consent to treatment verified Yes  Blood Transfusion Consent Verified N/A  Treatment Comments  Treatment Notes Hemodynamically stable post HD today.  Post-Treatment Handoff  Report Given to Primary Nurse Serenity Rivera RN  Time Report Given 17:52  Report Given By Erika Hines RN  Machine Validation  Time 13:00  Date 4/23/2025  Auto Alarm Test Passed Yes  Machine Serial # [id number]  Portable RO Yes  RO Serial# 14  Residual Bleach Negative Yes  Was a manufactured mix used? Yes  Machine Log  Completed Yes  Total Chlorine (less than 0.1)? Yes  Total Chlorine Log Completed Yes  Bicarb BiBag  Bibag Size 900  Machine Temp 36  Machine Conductivity 13.9  Meter Type N/A  Meter Conductivity   Independent Conductivity 14  pH Status Pass Pass  pH   TCD Value 13.8  TCD Alarm with +/- 0.5 Yes  NVL enabled validated 100 asymmetric Yes  Safety check complete Erika Hines RN  Second Verification Performed? Yes  Second Verification Performed By Kristan Beck RN  Reason Not Verified   Serum Lab Values  Time BUN Creatinine Na K (mEq/L) Cl CO2 Ca (mEq/L) Phos Mg (mg/dL) Alb (g/dL) Glucose Hgb Hct WBC Plt PT aPTT INR Other Clinician  04/23/2025 06:19 44 2.8 136 4.1 99 27 8.3 - - - 100 9.6 30.9 6.06 124 - - - - Erika Hines RN  Comments: LABS  Facility Information Room # 384 Diagnosis  Facility Information Bed # 4 Diagnosis Anemia, multifactorial to include chronic kidney disease, iron deficiency and possible bleed  Admission Date 04/20/2025 Stat Treatment No Isolation Information  Ordering MD Rueda Patient Type New patient to dialysis with diagnosis of “Acute Kidney Injury” Isolation Required? N  Account/Finance Number [id number] Patient Chronic Unit [facility] Completed by  Admission Status InPatient Patient Home Unit TBD General Tx information Entered by Erika Hines RN  Location Dialysis Suite Multi Code Status Full Code   Start Treatment Time Out Confirmed by Kristan Beck/Erika Hines Correct access site verified Yes  Treatment Initiation Connections Secured Time Out Completed 13:50 Treatment Start Date 04/23/2025  Saline line double clamped Correct patient verified Yes Treatment Start Time 13:59  Hemaclip Applied Correct procedure verified Yes Patient/Family questions and concerns addressed Yes  Pre Focused Assessment Notes room air LOC Alert and Oriented x3  Access Edema GI / Bowels  Signs and Symptoms of Infection? No Location Generalized GI Bowel sounds present  Pain Screening Edema Grade 1+   Does the  patient have pain? No Cardiac  Voids  Respiratory Heart Rhythm Regular Completed by  Lung Sounds Clear Telemetry Yes Pre Treatment Focused Assessment Completed By Erika Hines RN  Location Bilateral Skin Time 13:50  Position Anterior Skin Warm Signing  Respiratory Efforts Unlabored LOC Signed By Erika Hines RN  Education  Infection Prevention Caregiver Education Provided? Yes  Patient Education  Hemodialysis treatment review Focus or Topic Hemodialysis treatment review  Patient Educated? Yes  Treatment Adherence Method Knowledge / Understanding Assessed Teach back  Focus or Topic Catheter Reduction Method Knowledge / Understanding Assessed Teach back Patient Education Introduced By  Access Maintenance Family Education Provided? N/A Patient Education Introduced By Kristan Beck RN  Post-Treatment HD machine external disinfection completed per policy Yes Completed by  Post Treatment Delay PRO external disinfection completed per policy Yes Post Treatment Form Completed By Erika Hines RN  Delay N Post Treatment General Information   Machine Disinfection Requirement Notes Verbal to primary nurse.   Post Focused Assessment Lung Sounds Clear LOC  Changes from Pre Focused Assessment Location Bilateral LOC Alert and Oriented x3  Changes from Pre Treatment Focused Assessment? No Position Anterior GI / Bowels  Access Respiratory Efforts Unlabored GI Bowel sounds present  Cath Packed with Heparin Notes room air   Access Port(ml) 1.6 Edema  Voids  Return Port(ml) 1.6 Location Generalized Completed by  Catheter clamped and capped Yes Edema Grade 1+ Post Treatment Focused Assessment Completed by Erika Hines RN  Access Flow Good Cardiac Date 04/23/2025  Dialyzer Clearance Streaked Heart Rhythm Regular Time 17:50  Pain Screening Telemetry Yes Signing  Does the patient have pain? No Skin Signed By Erika Hines RN  Respiratory Skin Warm

## 2025-04-23 NOTE — PROGRESS NOTES
Nephrology ([facility]) Progress Note      Patient:  Ricardo Hugo  YOB: 1950  Date of Service: 4/23/2025  MRN: [id number]   Acct: [id number]   Primary Care Physician: Nathan Zimmer MD  Advance Directive:   Code Status and Medical Interventions: No CPR (Do Not Attempt to Resuscitate); Limited Support; No cardioversion, No intubation (DNI)   Ordered at: 04/20/25 1702     Code Status (Patient has no pulse and is not breathing):    No CPR (Do Not Attempt to Resuscitate)     Medical Interventions (Patient has pulse or is breathing):    Limited Support     Medical Intervention Limits:    No cardioversion       No intubation (DNI)     Level Of Support Discussed With:    Patient     Admit Date: 4/20/2025       Hospital Day: 3  Referring Provider: No Known Provider      Patient personally seen and examined.  Complete chart including Consults, Notes, Operative Reports, Labs, Cardiology, and Radiology studies reviewed as able.        Subjective:  Ricardo Hugo is a 75 y.o. male for whom we were consulted for evaluation and treatment of chronic kidney disease stage 5 with progression to ESRD. Patient has recently been preparing to start hemodialysis. Had permcath placed on 4/16 and was due to start dialysis this week. Presented to ER on 4/20 with severe right lower extremity pain. Labs in ER showed creatinine 4.5 which is similar to his recent baseline. Was found to be significantly anemic with hemoglobin of 6.6. potassium elevated at 6.6. Admitted to medical floor. Received PRBC transfusion. Completed first HD on 4/21 and tolerated well. Second HD provided on 4/22.    Today is awake and alert. No new complaints.  No new overnight issues.       Allergies:  Ondansetron, Zofran [ondansetron hcl], Lortab [hydrocodone-acetaminophen], and Allopurinol    Home Meds:  Facility-Administered Medications Prior to Admission   Medication Dose Route Frequency Provider Last Rate Last Admin     cyanocobalamin injection 1,000 mcg  1,000 mcg Intramuscular Q28 Days Ruthie Parra, APRN   1,000 mcg at 08/11/23 1123     Medications Prior to Admission   Medication Sig Dispense Refill Last Dose/Taking    albuterol sulfate  (90 Base) MCG/ACT inhaler Inhale 2 puffs Every 6 (Six) Hours As Needed for Wheezing or Shortness of Air.   Past Week    ALPRAZolam (XANAX) 0.25 MG tablet Take 1 tablet by mouth Every Night.   Past Week    aspirin (aspirin) 81 MG EC tablet Take 1 tablet by mouth Daily.   Past Week    atorvastatin (LIPITOR) 10 MG tablet Take 1 tablet by mouth Daily. 90 tablet 3 Past Week    calcitriol (ROCALTROL) 0.5 MCG capsule Take 1 capsule by mouth Daily. 90 capsule 2 Past Week    carvedilol (COREG) 25 MG tablet Take 1 tablet by mouth 2 (Two) Times a Day With Meals.   Past Week    cholestyramine light 4 g packet Take 1 packet by mouth Daily. 90 packet 1 Past Week    cloNIDine (CATAPRES) 0.3 MG tablet Take 1 tablet by mouth 3 (Three) Times a Day. 270 tablet 2 Past Week    cloNIDine (CATAPRES-TTS) 0.2 MG/24HR patch Place 1 patch on the skin as directed by provider 1 (One) Time Per Week. THURSDAYS   Past Week    clopidogrel (PLAVIX) 75 MG tablet Take 1 tablet by mouth Daily.   Past Week    Copper Gluconate (Copper Caps) 2 MG capsule Take 2 mg by mouth Daily.   Past Week    docusate sodium (COLACE) 100 MG capsule Take 1 capsule by mouth 3 (Three) Times a Day As Needed for Constipation.   Past Week    Epoetin Anselmo (PROCRIT IJ) Inject 1 dose as directed 1 (One) Time Per Week. Monday   Past Month    fexofenadine (ALLEGRA) 180 MG tablet Take 1 tablet by mouth Daily.   Past Week    fluticasone (FLONASE) 50 MCG/ACT nasal spray Administer 2 sprays into the nostril(s) as directed by provider Daily.   Past Week    furosemide (Lasix) 20 MG tablet Take 0.5 tablets by mouth Daily.   Past Week    furosemide (LASIX) 40 MG tablet Take 1 tablet by mouth Daily. 90 tablet 2 Past Week    hydrALAZINE (APRESOLINE) 100  MG tablet Take 1 tablet by mouth 3 (Three) Times a Day. 100mg daily   Past Week    isosorbide dinitrate (ISORDIL) 10 MG tablet Take 1 tablet by mouth 3 (Three) Times a Day. 270 tablet 2 Past Week    levothyroxine (Synthroid) 100 MCG tablet Take 1 tablet by mouth Every Morning. 90 tablet 2 Past Week    magnesium chloride ER 64 MG DR tablet Take 1 tablet by mouth Daily.   Past Week    melatonin 5 MG tablet tablet Take 2 tablets by mouth Every Night. (Patient taking differently: Take 3 tablets by mouth Every Night. Patient taking 3 tablets per night)   Past Week    mesalamine (APRISO) 0.375 g 24 hr capsule Take 4 capsules by mouth Daily. 360 capsule 1 Past Week    montelukast (SINGULAIR) 10 MG tablet Take 1 tablet by mouth Every Night.   Past Week    Multiple Vitamins-Minerals (PRESERVISION/LUTEIN) capsule Take 1 capsule by mouth 2 (two) times a day.   Past Week    NIFEdipine CC (ADALAT CC) 60 MG 24 hr tablet Take 2 tablets by mouth Daily for 270 days.   Past Week Bedtime    NON FORMULARY Take 25 mg by mouth At Night As Needed. Zzzquill   Past Week    omeprazole (priLOSEC) 20 MG capsule Take 1 capsule by mouth Daily.   Past Week    sodium bicarbonate 650 MG tablet Take 1 tablet by mouth 5 (Five) Times a Day.   Past Week    spironolactone (ALDACTONE) 25 MG tablet Take 1 tablet by mouth Daily.   Past Week    tamsulosin (FLOMAX) 0.4 MG capsule 24 hr capsule Take 1 capsule by mouth Every Night.   Past Week    traMADol (ULTRAM) 50 MG tablet Take 1 tablet by mouth Every 6 (Six) Hours As Needed for Moderate Pain. 20 tablet 0 Past Week    valsartan (DIOVAN) 320 MG tablet Take 1 tablet by mouth Daily.   Past Week    vitamin D (ERGOCALCIFEROL) 1.25 MG (32954 UT) capsule capsule Take 1 capsule by mouth 1 (One) Time Per Week.   Past Week       Medicines:  Current Facility-Administered Medications   Medication Dose Route Frequency Provider Last Rate Last Admin    acetaminophen (TYLENOL) 160 MG/5ML oral solution 650 mg  650 mg Oral  Q4H PRN Katherine Camarena APRN        Or    acetaminophen (TYLENOL) suppository 650 mg  650 mg Rectal Q4H PRN Katherine Camarena, APRN        acetaminophen (TYLENOL) tablet 1,000 mg  1,000 mg Oral Q6H PRN Katherine Camarena, APRN   1,000 mg at 04/22/25 1245    ALPRAZolam (XANAX) tablet 0.25 mg  0.25 mg Oral Nightly Katherine Camarena, APRN   0.25 mg at 04/22/25 2107    aspirin EC tablet 81 mg  81 mg Oral Daily Katherine Camarena, APRN   81 mg at 04/22/25 1247    atorvastatin (LIPITOR) tablet 10 mg  10 mg Oral Daily Katherine Camarena, APRN   10 mg at 04/22/25 1244    sennosides-docusate (PERICOLACE) 8.6-50 MG per tablet 2 tablet  2 tablet Oral BID PRN Katherine Camarena APRN        And    polyethylene glycol (MIRALAX) packet 17 g  17 g Oral Daily PRN Katherine Camarena APRSHERITA        And    bisacodyl (DULCOLAX) EC tablet 5 mg  5 mg Oral Daily PRN Katherine Camarena APRN        And    bisacodyl (DULCOLAX) suppository 10 mg  10 mg Rectal Daily PRN Katherine Camarena, APRN        calcitriol (ROCALTROL) capsule 0.5 mcg  0.5 mcg Oral Daily Katherine Camarena, APRN   0.5 mcg at 04/22/25 1245    carvedilol (COREG) tablet 25 mg  25 mg Oral Q12H Katherine Camarena, APRN   25 mg at 04/22/25 2106    cetirizine (zyrTEC) tablet 10 mg  10 mg Oral Daily Katherine Camarena, APRN   10 mg at 04/22/25 1245    cloNIDine (CATAPRES) tablet 0.3 mg  0.3 mg Oral TID Katherine Camarena APRN   0.3 mg at 04/22/25 2106    cloNIDine (CATAPRES-TTS) 0.2 MG/24HR patch 1 patch  1 patch Transdermal Weekly Katherine Camarena, APRN        clopidogrel (PLAVIX) tablet 75 mg  75 mg Oral Daily Katherine Camarena, APRN   75 mg at 04/22/25 1245    furosemide (LASIX) tablet 40 mg  40 mg Oral BID Katherine Camarena APRN   40 mg at 04/22/25 2107    heparin (porcine) injection 3,600 Units  3,600 Units Intravenous PRN Bolivar Rueda MD   3,600 Units at 04/22/25 1223    hydrALAZINE (APRESOLINE) tablet 100 mg  100 mg Oral TID Katherine Camarena APRN   100 mg at  04/22/25 2106    isosorbide dinitrate (ISORDIL) tablet 10 mg  10 mg Oral TID - Nitrates Katherine Camarena, APRN   10 mg at 04/22/25 1741    levothyroxine (SYNTHROID, LEVOTHROID) tablet 100 mcg  100 mcg Oral Q AM Katherine Camarena, APRN   100 mcg at 04/23/25 0609    melatonin tablet 10 mg  10 mg Oral Nightly Katherine Camarena, APRN   10 mg at 04/22/25 2106    mesalamine (APRISO) 24 hr capsule 1.5 g  1.5 g Oral Daily Katherine Camarena, APRN   1.5 g at 04/22/25 1245    montelukast (SINGULAIR) tablet 10 mg  10 mg Oral Nightly Katherine Camarena, APRN   10 mg at 04/22/25 2106    NIFEdipine XL (PROCARDIA XL) 24 hr tablet 120 mg  120 mg Oral Daily Katherine Camarena, APRN   120 mg at 04/22/25 1245    pantoprazole (PROTONIX) EC tablet 40 mg  40 mg Oral Q AM Katherine Camarena APRN   40 mg at 04/23/25 0610    promethazine (PHENERGAN) tablet 12.5 mg  12.5 mg Oral Q6H PRN Katherine Camarena APRN   12.5 mg at 04/20/25 2305    sodium bicarbonate tablet 650 mg  650 mg Oral 5x Daily Katherine Camarena, APRN   650 mg at 04/23/25 0609    tamsulosin (FLOMAX) 24 hr capsule 0.4 mg  0.4 mg Oral Nightly Katherine Camarena, APRN   0.4 mg at 04/22/25 2106    traMADol (ULTRAM) tablet 50 mg  50 mg Oral BID PRN Donal Rodriguez MD   50 mg at 04/23/25 0610       Past Medical History:  Past Medical History:   Diagnosis Date    3-vessel CAD 08/11/2020    Allergic rhinitis     Anemia     Anxiety disorder 04/27/2020    Arthritis     Asymmetrical sensorineural hearing loss 06/28/2017    Atherosclerosis of native artery of both lower extremities with intermittent claudication 07/18/2019    Avascular necrosis of femoral head, left 07/11/2020    right hip after surgery    Carotid stenosis     Chronic mucoid otitis media     Chronic rhinitis     COPD (chronic obstructive pulmonary disease)     Coronary artery disease     HEART BYPASS 2004    Crohn's disease of large intestine with other complication 07/30/2020    Chronic diarrhea Colonoscopy July  2020 revealed mild patchy scattered hemosiderin staining with inflammation more so in rectosigmoid area.  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    Iron deficiency anemia 07/14/2020    Mixed hearing loss of left ear     PAD (peripheral artery disease) 08/11/2020    Perianal abscess     Pernicious anemia 08/17/2020    took shots but never diagnosed with b12 deficiency    Personal history of alcoholism 08/11/2020    quit drinking in 2013    Prostatic hypertrophy 08/11/2020    Sensorineural hearing loss     Sepsis with acute renal failure 09/15/2020    Shortness of breath 05/27/2021    Tinnitus     Ventricular tachycardia, nonsustained 07/14/2020    Weight loss 07/11/2020       Past Surgical History:  Past Surgical History:   Procedure Laterality Date    ARTERY SURGERY  2021    right side on neck    CAROTID ENDARTERECTOMY Right 05/10/2021    Procedure: RIGHT CAROTID ENDARTERECTOMY WITH EEG;  Surgeon: Gil Pineda DO;  Location: [facility] OR ;  Service: Vascular;  Laterality: Right;    COLONOSCOPY N/A 07/02/2020    Procedure: COLONOSCOPY WITH ANESTHESIA;  Surgeon: Adrien Brewster MD;  Location: [facility] ENDOSCOPY;  Service: Gastroenterology;  Laterality: N/A;  pre op: diarrhea  post op: polyps  PCP: Joe Velasco MD    COLONOSCOPY N/A 10/13/2020    Procedure: COLONOSCOPY WITH ANESTHESIA;  Surgeon: Adrien Brewster MD;  Location: [facility] ENDOSCOPY;  Service: Gastroenterology;  Laterality: N/A;  Pre: Chronic Diarrhea, Crohn's  Post: AVM  Dr. Neftali Velasco  CO2 Inflation Used    COLONOSCOPY N/A 12/08/2023    Procedure: COLONOSCOPY WITH ANESTHESIA;   Surgeon: Adrien Brewster MD;  Location: [facility] ENDOSCOPY;  Service: Gastroenterology;  Laterality: N/A;  pre chrone's disease  post sub optimal prep, polyp, chrone's      CORONARY ARTERY BYPASS GRAFT  2003    x3    ENDOSCOPY N/A 11/02/2021    Procedure: ESOPHAGOGASTRODUODENOSCOPY WITH ANESTHESIA;  Surgeon: Bridger Bell MD;  Location: [facility] ENDOSCOPY;  Service: Gastroenterology;  Laterality: N/A;  pre anemia;gi bleed  post  gi bleed;schatski ring  Dr. ERIC Velasco    ENDOSCOPY N/A 10/10/2023    Procedure: ESOPHAGOGASTRODUODENOSCOPY WITH ANESTHESIA;  Surgeon: Adrien Brewster MD;  Location: [facility] ENDOSCOPY;  Service: Gastroenterology;  Laterality: N/A;  preop; anemia  postop esophagitis ; R/O barretts   PCP Randall Beata    EYE SURGERY Bilateral     catorac    INCISION AND DRAINAGE PERIRECTAL ABSCESS N/A 03/03/2017    Procedure: INCISION AND DRAINAGE OF JEET ANAL ABSCESS;  Surgeon: Lynette Smith MD;  Location: [facility] OR;  Service:     INGUINAL HERNIA REPAIR Bilateral 06/27/2023    Procedure: INGUINAL HERNIA BILATERAL REPAIR LAPAROSCOPIC WITH DAVINCI ROBOT WITH MESH;  Surgeon: Tahira Rivera MD;  Location: [facility] OR;  Service: Robotics - DaVinci;  Laterality: Bilateral;    INSERTION HEMODIALYSIS CATHETER N/A 4/16/2025    Procedure: HEMODIALYSIS CATHETER PLACEMENT;  Surgeon: Gil Pineda DO;  Location: [facility] HYBRID OR;  Service: Vascular;  Laterality: N/A;    MYRINGOTOMY W/ TUBES Left 04/17/2017    06/10/2016    TONSILLECTOMY      TOTAL HIP ARTHROPLASTY Right 2006       Family History  Family History   Problem Relation Age of Onset    Breast cancer Mother     Dementia Father     Glaucoma Father     No Known Problems Daughter     Colon polyps Neg Hx     Colon cancer Neg Hx        Social History  Social History     Socioeconomic History    Marital status:    Tobacco Use    Smoking status: Former     Current packs/day: 0.00     Average packs/day: 0.5 packs/day for 25.8 years  (12.9 ttl pk-yrs)     Types: Cigarettes     Start date:      Quit date: 10/13/2013     Years since quittin.5     Passive exposure: Past    Smokeless tobacco: Never    Tobacco comments:     quit 2013   Vaping Use    Vaping status: Former    Substances: Nicotine    Devices: Pre-filled or refillable cartridge   Substance and Sexual Activity    Alcohol use: Not Currently    Drug use: No    Sexual activity: Not Currently     Partners: Female       Review of Systems:  History obtained from chart review and the patient  General ROS: No fever or chills  Respiratory ROS: No cough, shortness of breath, wheezing  Cardiovascular ROS: No chest pain or palpitations  Gastrointestinal ROS: No abdominal pain or melena  Genito-Urinary ROS: No dysuria or hematuria  Psych ROS: No anxiety and depression  14 point ROS reviewed with the patient and negative except as noted above and in the HPI unless unable to obtain.    Objective:  Patient Vitals for the past 24 hrs:   BP Temp Temp src Pulse Resp SpO2 Weight   25 0758 134/43 97.4 °F (36.3 °C) Oral (!) 47 16 99 % --   25 0600 -- -- -- -- -- -- 60.9 kg (134 lb 3.2 oz)   25 0328 144/40 98.4 °F (36.9 °C) Oral 73 16 95 % --   25 2330 170/43 98.9 °F (37.2 °C) Oral 55 16 96 % --   25 2007 176/46 99 °F (37.2 °C) Oral 61 16 94 % --   25 1859 164/44 -- -- -- -- -- --   25 1617 (!) 197/52 98.8 °F (37.1 °C) Oral 57 16 97 % --   25 1314 160/48 98 °F (36.7 °C) Oral 67 16 95 % --       Intake/Output Summary (Last 24 hours) at 2025 0956  Last data filed at 2025 0612  Gross per 24 hour   Intake 240 ml   Output 400 ml   Net -160 ml     General: awake/alert   Chest:  clear to auscultation bilaterally without respiratory distress  CVS: regular rate and rhythm  Abdominal: soft, nontender, positive bowel sounds  Extremities: no cyanosis or edema  Skin: warm and dry without rash      Labs:  Results from last 7 days   Lab Units 25  0619  04/22/25  0245 04/21/25  0850   WBC 10*3/mm3 6.06 4.42 4.58   HEMOGLOBIN g/dL 9.6* 8.2* 8.4*   HEMATOCRIT % 30.9* 27.1* 27.6*   PLATELETS 10*3/mm3 124* 97* 96*         Results from last 7 days   Lab Units 04/23/25  0619 04/22/25  0245 04/21/25  0850 04/20/25  1732 04/20/25  1400   SODIUM mmol/L 136 138 137  137   < >  --    POTASSIUM mmol/L 4.1 4.4 4.9  4.9   < >  --    CHLORIDE mmol/L 99 101 105  105   < >  --    CO2 mmol/L 27.0 25.0 17.0*  17.0*   < >  --    BUN mg/dL 44* 55* 99*  99*   < >  --    CREATININE mg/dL 2.80* 3.50* 4.35*  4.35*   < >  --    CALCIUM mg/dL 8.3* 8.3* 8.5*  8.5*   < >  --    EGFR mL/min/1.73 22.5* 17.2* 13.3*  13.3*   < >  --    BILIRUBIN mg/dL  --   --  0.2  --  0.2   ALK PHOS U/L  --   --  145*  --  176*   ALT (SGPT) U/L  --   --  10  --  10   AST (SGOT) U/L  --   --  14  --  17   GLUCOSE mg/dL 100* 93 107*  107*   < >  --     < > = values in this interval not displayed.       Radiology:   Imaging Results (Last 72 Hours)       Procedure Component Value Units Date/Time    CT Angio Abdominal Aorta Bilateral Iliofem Runoff [[id number]] Collected: 04/21/25 1621     Updated: 04/21/25 1636    Narrative:      EXAM/TECHNIQUE: CT angiography with 3D MIP images abdomen and pelvis  with bilateral lower extremity runoff     INDICATION: aortoiliac disease right lower extremity ischemia;  N18.9-Chronic kidney disease, unspecified; E87.5-Hyperkalemia;  D64.9-Anemia, unspecified; M54.16-Radiculopathy, lumbar region;  I73.9-Peripheral vascular disease, unspecified     COMPARISON: 9/15/2020     DLP: 479.06 mGy.cm. Automated exposure control was utilized to decrease  patient radiation dose.     FINDINGS:     Nonaneurysmal abdominal aorta with heavy atherosclerotic calcification.  Celiac artery SMA, and MIKI are widely patent. Mild atherosclerotic  narrowing of the left main renal artery. There are 2 renal arteries  bilaterally.     The right common iliac and internal iliac arteries are widely  patent.  Focal moderate to severe atherosclerotic stenosis of the right external  iliac artery on series 4 image 155. The right common femoral artery is  widely patent and contains heavy atherosclerotic calcification. Right  profunda is widely patent. Heavy atherosclerotic calcification  throughout the right SFA with multifocal areas of moderate stenosis.  Patent atherosclerotic popliteal artery. Three-vessel right lower  extremity runoff.     The left common iliac and internal iliac artery are widely patent.  Moderate atherosclerotic stenosis of the left external iliac artery on  series 4 image 159. Left common femoral artery is widely patent. Left  profunda is widely patent. Heavy atherosclerotic calcification  throughout the SFA with multifocal areas of proximal stenosis. The left  SFA appears occluded along its mid course (series 4 image 297) with  reconstitution occurring at the distal vessel. After reconstitution,  there is an area of severe left SFA stenosis on series 4 image 359.  Patent left popliteal artery. Three-vessel left lower extremity runoff  appears maintained.     Nonvascular findings:     Moderate right pleural effusion with overlying atelectasis. Trace left  pleural effusion. Cardiomegaly. Liver appears cirrhotic. 9 mm arterial  enhancing lesion in the left liver on image 54. No gallstone or biliary  ductal dilatation. The pancreas, spleen, and adrenal glands are  unremarkable. Multifocal bilateral renal cortical scarring. No  hydronephrosis or large calculi. No focal urinary bladder wall  thickening.      Moderate volume stool throughout the colon. No colonic wall thickening  or pericolonic fat stranding. No small bowel distention or inflammation.     No evidence of ascites. There is diffuse edematous change throughout the  mesentery/peritoneum. No pelvic mass or collection. Prostate is enlarged  measuring 5.3 cm transverse dimension. No pathologically enlarged  abdominal or pelvic lymph  nodes.     Diffuse abdominal wall soft tissue edema. Right hip arthroplasty. Left  femoral head avascular necrosis with areas of subchondral collapse  noted.       Impression:         1.  Nonaneurysmal abdominal aorta with heavy atherosclerotic  calcification throughout.     2.  Focal moderate-severe atherosclerotic stenosis of the right external  iliac artery. Heavy atherosclerotic calcification throughout the right  SFA with multifocal areas of moderate stenosis. Three-vessel right lower  extremity runoff.     3.  Moderate atherosclerotic stenosis of the left external iliac artery.  Heavy atherosclerotic calcification throughout the left SFA with  multifocal moderate stenosis proximally and complete occlusion of the  mid vessel with reconstitution distally. After reconstitution, there is  an area of focal severe left SFA stenosis. Three-vessel left lower  extremity runoff appears maintained.     4.  Moderate right pleural effusion. Cardiomegaly.     5.  Liver appears cirrhotic. 9 mm enhancing lesion in the left liver on  image 54 series 4. Recommend follow-up liver protocol CT or MRI for  further characterization.     6.  Anasarca with diffuse abdominal wall soft tissue edema and extensive  diffuse edematous change throughout the mesentery and peritoneum.     7.  Advanced left femoral head avascular necrosis.           This report was signed and finalized on 4/21/2025 4:33 PM by Dr. Wellington Farley MD.       US Arterial Doppler Lower Extremity Complete [[id number]] Collected: 04/21/25 1419     Updated: 04/21/25 1425    Narrative:      History: PAD     Comments: Grayscale imaging as well as color flow duplex were used to  evaluate bilateral lower extremity arterial systems.     On the right, the peak systolic velocity in the common femoral artery  measured 31.7 cm/s. In the profunda femoris artery measured 50.3 cm/s.  The proximal SFA appears occluded. The mid SFA measured 20.1 cm/s. In  the distal SFA measured  48.6 cm/s. In the popliteal artery measured 25.3  cm/s. In the posterior tibial artery measured 20.7 cm/s. In the anterior  tibial artery measured 35.1 cm/s.     On the left, the peak systolic velocity in the common femoral artery  measured 103.2 cm/s. In the profunda femoris artery measured 123.3 cm/s.  In the proximal SFA measured 26.5 cm/s. The mid SFA appears occluded. In  the distal SFA measured 27.3 cm/s. In the popliteal artery measured 16.3  cm/s. In the posterior tibial artery measured 55.8 cm/s. In the anterior  tibial artery measured 42.5 cm/s.       Impression:      In the right lower extremity, there is heavy plaque burden  in the common femoral artery with an occluded proximal SFA. There is  reconstitution in the mid SFA but dampened flow throughout the lower  extremity with heavy plaque burden throughout. In the left lower  extremity, there is also heavy plaque burden in the common femoral  artery with an occluded mid SFA. There is reconstitution in the distal  SFA with dampened flow throughout the lower extremity with heavy plaque  burden throughout. Further imaging/evaluation may be warranted.     This report was signed and finalized on 4/21/2025 2:22 PM by Dr. Gil Pineda MD.        Renal Bilateral [[id number]] Collected: 04/20/25 1717     Updated: 04/20/25 1722    Narrative:      RENAL ULTRASOUND COMPLETE 4/20/2025 3:47 PM     REASON FOR EXAM: Renal Failure; N18.9-Chronic kidney disease,  unspecified; E87.5-Hyperkalemia; D64.9-Anemia, unspecified;  M54.16-Radiculopathy, lumbar region; I73.9-Peripheral vascular disease,  unspecified.     COMPARISON: None.       TECHNIQUE: Multiple longitudinal and transverse realtime sonographic  images of the kidneys and urinary bladder are obtained.     FINDINGS:     RIGHT KIDNEY: The right kidney measures 9.7 cm in length. The cortical  thickness is normal. There is increased cortical echogenicity. There is  a mid to lower pole cyst measuring 1.7 x 1.4 x  1.6 cm. There is no  hydronephrosis. No nephrolithiasis or abnormal perinephric fluid  collections.     LEFT KIDNEY: The left kidney measures 10.1 cm in length. The cortical  thickness is normal. There is increased cortical echogenicity. There is  a 1 x 0.7 x 0.9 cm cyst in the lower pole. There is no hydronephrosis.  No nephrolithiasis or abnormal perinephric fluid collections.     PELVIS: There is mild thickening of the bladder wall. There is no free  fluid in the pelvis.     ADDITIONAL FINDINGS: The abdominal aorta is normal caliber. There is  atheromatous calcification.       Impression:      1. Increased cortical echogenicity of both kidneys consistent with  medical renal disease. No hydronephrosis.  2. Bilateral renal cysts.  3. Mild wall thickening of the urinary bladder which may be related to  muscular hypertrophy. Acute and chronic inflammation and infection are  also included in the differential. Neoplasm less likely.  4. Atheromatous abdominal aorta.           The images and report are stored on PAC's per institutional and state  guidelines.           This report was signed and finalized on 4/20/2025 5:19 PM by Dr. Mikael Gallegos MD.       XR Chest 1 View [[id number]] Collected: 04/20/25 1155     Updated: 04/20/25 1159    Narrative:      XR CHEST 1 VW- 4/20/2025 10:40 AM     HISTORY: Preop; N18.9-Chronic kidney disease, unspecified;  E87.5-Hyperkalemia; D64.9-Anemia, unspecified; M54.16-Radiculopathy,  lumbar region; I73.9-Peripheral vascular disease, unspecified       COMPARISON: Chest x-ray dated 4/13/2025     FINDINGS:  Upright frontal radiograph of the chest was obtained     Bilateral interstitial coarsening with subpleural lines reflecting  interstitial edema. Trace left-sided and small to moderate sided right  pleural effusions. No consolidation or pneumothorax. Prior coronary  bypass. Right IJ dual-lumen central venous catheter. No acute bony  abnormality.       Impression:      1.  Volume  "overload with interstitial edema and bilateral pleural  effusions as detailed above.     This report was signed and finalized on 4/20/2025 11:56 AM by Dr Tyrell Knox.               Culture:  No results found for: \"BLOODCX\", \"URINECX\", \"WOUNDCX\", \"MRSACX\", \"RESPCX\", \"STOOLCX\"      Assessment    Chronic kidney disease with progression to end stage renal disease  Hypertension  Hyperkalemia--improved  Severe anemia--s/p PRBC transfusion on 4/20  Secondary hyperparathyroidism  Metabolic acidosis    Plan:   Dialysis today  Noted plans for angiogram, possibly tomorrow?  Outpatient HD chair time arranged. Will be starting Friday, 4/25 at [facility] outpatient clinic      Slava Tate, APRN  4/23/2025  09:56 CDT    "

## 2025-04-23 NOTE — PLAN OF CARE
Goal Outcome Evaluation:   Pt A/Ox4, hypertensive this shift, vitals otherwise stable. Dialysis today 2L off. R chest permacath. Plans for angiogram Thursday per vascular surgery. C/o RLE pain, see MAR. Safety maintained.

## 2025-04-23 NOTE — CASE MANAGEMENT/SOCIAL WORK
Continued Stay Note  NANDINI Oneill     Patient Name: Ricardo Hugo  MRN: [id number]  Today's Date: 4/23/2025    Admit Date: 4/20/2025    Plan: Home   Discharge Plan       Row Name 04/23/25 1416       Plan    Plan Home    Patient/Family in Agreement with Plan yes    Plan Comments [facility] has changed pt's chair time to TTS 1040, with first treatment Saturday, April 26. They have informed pt's daughter Kathleen.                   Discharge Codes    No documentation.                       LINNETTE Ramos

## 2025-04-24 ENCOUNTER — APPOINTMENT (OUTPATIENT)
Dept: INTERVENTIONAL RADIOLOGY/VASCULAR | Facility: HOSPITAL | Age: 75
DRG: 278 | End: 2025-04-24
Payer: MEDICARE

## 2025-04-24 ENCOUNTER — ANESTHESIA (OUTPATIENT)
Dept: PERIOP | Facility: HOSPITAL | Age: 75
End: 2025-04-24
Payer: MEDICARE

## 2025-04-24 ENCOUNTER — ANESTHESIA EVENT (OUTPATIENT)
Dept: PERIOP | Facility: HOSPITAL | Age: 75
End: 2025-04-24
Payer: MEDICARE

## 2025-04-24 LAB
ABO GROUP BLD: NORMAL
ANION GAP SERPL CALCULATED.3IONS-SCNC: 11 MMOL/L (ref 5–15)
BLD GP AB SCN SERPL QL: NEGATIVE
BUN SERPL-MCNC: 36 MG/DL (ref 8–23)
BUN/CREAT SERPL: 12.5 (ref 7–25)
CALCIUM SPEC-SCNC: 8.5 MG/DL (ref 8.6–10.5)
CHLORIDE SERPL-SCNC: 99 MMOL/L (ref 98–107)
CO2 SERPL-SCNC: 25 MMOL/L (ref 22–29)
CREAT SERPL-MCNC: 2.87 MG/DL (ref 0.76–1.27)
DEPRECATED RDW RBC AUTO: 68.1 FL (ref 37–54)
EGFRCR SERPLBLD CKD-EPI 2021: 21.9 ML/MIN/1.73
ERYTHROCYTE [DISTWIDTH] IN BLOOD BY AUTOMATED COUNT: 18.1 % (ref 12.3–15.4)
GLUCOSE SERPL-MCNC: 89 MG/DL (ref 65–99)
HCT VFR BLD AUTO: 31.5 % (ref 37.5–51)
HGB BLD-MCNC: 9.9 G/DL (ref 13–17.7)
MCH RBC QN AUTO: 32 PG (ref 26.6–33)
MCHC RBC AUTO-ENTMCNC: 31.4 G/DL (ref 31.5–35.7)
MCV RBC AUTO: 101.9 FL (ref 79–97)
PLATELET # BLD AUTO: 100 10*3/MM3 (ref 140–450)
PMV BLD AUTO: 10.2 FL (ref 6–12)
POTASSIUM SERPL-SCNC: 4.2 MMOL/L (ref 3.5–5.2)
RBC # BLD AUTO: 3.09 10*6/MM3 (ref 4.14–5.8)
RH BLD: NEGATIVE
SODIUM SERPL-SCNC: 135 MMOL/L (ref 136–145)
T&S EXPIRATION DATE: NORMAL
WBC NRBC COR # BLD AUTO: 4.2 10*3/MM3 (ref 3.4–10.8)

## 2025-04-24 PROCEDURE — 86900 BLOOD TYPING SEROLOGIC ABO: CPT | Performed by: SURGERY

## 2025-04-24 PROCEDURE — 25810000003 SODIUM CHLORIDE 0.9 % SOLUTION

## 2025-04-24 PROCEDURE — 86901 BLOOD TYPING SEROLOGIC RH(D): CPT | Performed by: SURGERY

## 2025-04-24 PROCEDURE — 80048 BASIC METABOLIC PNL TOTAL CA: CPT | Performed by: NURSE PRACTITIONER

## 2025-04-24 PROCEDURE — 85027 COMPLETE CBC AUTOMATED: CPT | Performed by: NURSE PRACTITIONER

## 2025-04-24 PROCEDURE — 86850 RBC ANTIBODY SCREEN: CPT | Performed by: SURGERY

## 2025-04-24 PROCEDURE — 99232 SBSQ HOSP IP/OBS MODERATE 35: CPT | Performed by: NURSE PRACTITIONER

## 2025-04-24 PROCEDURE — 75625 CONTRAST EXAM ABDOMINL AORTA: CPT

## 2025-04-24 RX ORDER — BISACODYL 10 MG
10 SUPPOSITORY, RECTAL RECTAL DAILY PRN
Status: DISCONTINUED | OUTPATIENT
Start: 2025-04-24 | End: 2025-04-26 | Stop reason: HOSPADM

## 2025-04-24 RX ORDER — HYDROMORPHONE HYDROCHLORIDE 1 MG/ML
0.2 INJECTION, SOLUTION INTRAMUSCULAR; INTRAVENOUS; SUBCUTANEOUS
Status: CANCELLED | OUTPATIENT
Start: 2025-04-24 | End: 2025-04-29

## 2025-04-24 RX ORDER — PROMETHAZINE HYDROCHLORIDE 25 MG/1
12.5 TABLET ORAL EVERY 6 HOURS PRN
Status: DISCONTINUED | OUTPATIENT
Start: 2025-04-24 | End: 2025-04-26 | Stop reason: HOSPADM

## 2025-04-24 RX ORDER — ALPRAZOLAM 0.25 MG
0.25 TABLET ORAL NIGHTLY
Status: DISCONTINUED | OUTPATIENT
Start: 2025-04-24 | End: 2025-04-26 | Stop reason: HOSPADM

## 2025-04-24 RX ORDER — PANTOPRAZOLE SODIUM 40 MG/1
40 TABLET, DELAYED RELEASE ORAL
Status: DISCONTINUED | OUTPATIENT
Start: 2025-04-25 | End: 2025-04-26 | Stop reason: HOSPADM

## 2025-04-24 RX ORDER — SODIUM CHLORIDE 9 MG/ML
INJECTION, SOLUTION INTRAVENOUS CONTINUOUS PRN
Status: DISCONTINUED | OUTPATIENT
Start: 2025-04-24 | End: 2025-04-27 | Stop reason: HOSPADM

## 2025-04-24 RX ORDER — SODIUM CHLORIDE, SODIUM LACTATE, POTASSIUM CHLORIDE, CALCIUM CHLORIDE 600; 310; 30; 20 MG/100ML; MG/100ML; MG/100ML; MG/100ML
100 INJECTION, SOLUTION INTRAVENOUS CONTINUOUS
Status: DISCONTINUED | OUTPATIENT
Start: 2025-04-24 | End: 2025-04-25

## 2025-04-24 RX ORDER — NALOXONE HCL 0.4 MG/ML
0.4 VIAL (ML) INJECTION AS NEEDED
Status: CANCELLED | OUTPATIENT
Start: 2025-04-24

## 2025-04-24 RX ORDER — ASPIRIN 81 MG/1
81 TABLET ORAL DAILY
Status: DISCONTINUED | OUTPATIENT
Start: 2025-04-25 | End: 2025-04-26 | Stop reason: HOSPADM

## 2025-04-24 RX ORDER — CETIRIZINE HYDROCHLORIDE 10 MG/1
10 TABLET ORAL DAILY
Status: DISCONTINUED | OUTPATIENT
Start: 2025-04-25 | End: 2025-04-26 | Stop reason: HOSPADM

## 2025-04-24 RX ORDER — AMOXICILLIN 250 MG
2 CAPSULE ORAL 2 TIMES DAILY PRN
Status: DISCONTINUED | OUTPATIENT
Start: 2025-04-24 | End: 2025-04-26 | Stop reason: HOSPADM

## 2025-04-24 RX ORDER — LEVOTHYROXINE SODIUM 100 UG/1
100 TABLET ORAL
Status: DISCONTINUED | OUTPATIENT
Start: 2025-04-25 | End: 2025-04-26 | Stop reason: HOSPADM

## 2025-04-24 RX ORDER — BISACODYL 5 MG/1
5 TABLET, DELAYED RELEASE ORAL DAILY PRN
Status: DISCONTINUED | OUTPATIENT
Start: 2025-04-24 | End: 2025-04-26 | Stop reason: HOSPADM

## 2025-04-24 RX ORDER — CALCITRIOL 0.25 UG/1
0.5 CAPSULE, LIQUID FILLED ORAL DAILY
Status: DISCONTINUED | OUTPATIENT
Start: 2025-04-25 | End: 2025-04-26 | Stop reason: HOSPADM

## 2025-04-24 RX ORDER — TRAMADOL HYDROCHLORIDE 50 MG/1
50 TABLET ORAL 2 TIMES DAILY PRN
Status: DISCONTINUED | OUTPATIENT
Start: 2025-04-24 | End: 2025-04-25

## 2025-04-24 RX ORDER — FUROSEMIDE 40 MG/1
40 TABLET ORAL 2 TIMES DAILY
Status: DISCONTINUED | OUTPATIENT
Start: 2025-04-24 | End: 2025-04-26 | Stop reason: HOSPADM

## 2025-04-24 RX ORDER — SODIUM CHLORIDE 0.9 % (FLUSH) 0.9 %
3 SYRINGE (ML) INJECTION EVERY 12 HOURS SCHEDULED
Status: DISCONTINUED | OUTPATIENT
Start: 2025-04-24 | End: 2025-04-24 | Stop reason: HOSPADM

## 2025-04-24 RX ORDER — SODIUM CHLORIDE 0.9 % (FLUSH) 0.9 %
3-10 SYRINGE (ML) INJECTION AS NEEDED
Status: DISCONTINUED | OUTPATIENT
Start: 2025-04-24 | End: 2025-04-24 | Stop reason: HOSPADM

## 2025-04-24 RX ORDER — ATORVASTATIN CALCIUM 10 MG/1
10 TABLET, FILM COATED ORAL DAILY
Status: DISCONTINUED | OUTPATIENT
Start: 2025-04-25 | End: 2025-04-26 | Stop reason: HOSPADM

## 2025-04-24 RX ORDER — LABETALOL HYDROCHLORIDE 5 MG/ML
5 INJECTION, SOLUTION INTRAVENOUS
Status: CANCELLED | OUTPATIENT
Start: 2025-04-24

## 2025-04-24 RX ORDER — TAMSULOSIN HYDROCHLORIDE 0.4 MG/1
0.4 CAPSULE ORAL NIGHTLY
Status: DISCONTINUED | OUTPATIENT
Start: 2025-04-24 | End: 2025-04-26 | Stop reason: HOSPADM

## 2025-04-24 RX ORDER — ACETAMINOPHEN 160 MG/5ML
650 SOLUTION ORAL EVERY 4 HOURS PRN
Status: DISCONTINUED | OUTPATIENT
Start: 2025-04-24 | End: 2025-04-26 | Stop reason: HOSPADM

## 2025-04-24 RX ORDER — ACETAMINOPHEN 500 MG
1000 TABLET ORAL EVERY 6 HOURS PRN
Status: DISCONTINUED | OUTPATIENT
Start: 2025-04-24 | End: 2025-04-26 | Stop reason: HOSPADM

## 2025-04-24 RX ORDER — POLYETHYLENE GLYCOL 3350 17 G/17G
17 POWDER, FOR SOLUTION ORAL DAILY PRN
Status: DISCONTINUED | OUTPATIENT
Start: 2025-04-24 | End: 2025-04-26 | Stop reason: HOSPADM

## 2025-04-24 RX ORDER — SODIUM CHLORIDE 9 MG/ML
40 INJECTION, SOLUTION INTRAVENOUS AS NEEDED
Status: DISCONTINUED | OUTPATIENT
Start: 2025-04-24 | End: 2025-04-24 | Stop reason: HOSPADM

## 2025-04-24 RX ORDER — FLUMAZENIL 0.1 MG/ML
0.2 INJECTION INTRAVENOUS AS NEEDED
Status: CANCELLED | OUTPATIENT
Start: 2025-04-24

## 2025-04-24 RX ORDER — ACETAMINOPHEN 650 MG/1
650 SUPPOSITORY RECTAL EVERY 4 HOURS PRN
Status: DISCONTINUED | OUTPATIENT
Start: 2025-04-24 | End: 2025-04-26 | Stop reason: HOSPADM

## 2025-04-24 RX ORDER — MESALAMINE 0.38 G/1
1.5 CAPSULE, EXTENDED RELEASE ORAL DAILY
Status: DISCONTINUED | OUTPATIENT
Start: 2025-04-25 | End: 2025-04-26 | Stop reason: HOSPADM

## 2025-04-24 RX ORDER — ISOSORBIDE DINITRATE 10 MG/1
10 TABLET ORAL
Status: DISCONTINUED | OUTPATIENT
Start: 2025-04-24 | End: 2025-04-26 | Stop reason: HOSPADM

## 2025-04-24 RX ORDER — SODIUM BICARBONATE 650 MG/1
650 TABLET ORAL
Status: DISCONTINUED | OUTPATIENT
Start: 2025-04-24 | End: 2025-04-26 | Stop reason: HOSPADM

## 2025-04-24 RX ORDER — MONTELUKAST SODIUM 10 MG/1
10 TABLET ORAL NIGHTLY
Status: DISCONTINUED | OUTPATIENT
Start: 2025-04-24 | End: 2025-04-26 | Stop reason: HOSPADM

## 2025-04-24 RX ORDER — IBUPROFEN 600 MG/1
600 TABLET, FILM COATED ORAL EVERY 6 HOURS PRN
Status: CANCELLED | OUTPATIENT
Start: 2025-04-24

## 2025-04-24 RX ORDER — HEPARIN SODIUM 1000 [USP'U]/ML
3600 INJECTION, SOLUTION INTRAVENOUS; SUBCUTANEOUS AS NEEDED
Status: DISCONTINUED | OUTPATIENT
Start: 2025-04-24 | End: 2025-04-26 | Stop reason: HOSPADM

## 2025-04-24 RX ORDER — CLOPIDOGREL BISULFATE 75 MG/1
75 TABLET ORAL DAILY
Status: DISCONTINUED | OUTPATIENT
Start: 2025-04-25 | End: 2025-04-26 | Stop reason: HOSPADM

## 2025-04-24 RX ORDER — FENTANYL CITRATE 50 UG/ML
50 INJECTION, SOLUTION INTRAMUSCULAR; INTRAVENOUS
Status: CANCELLED | OUTPATIENT
Start: 2025-04-24

## 2025-04-24 RX ADMIN — Medication 10 MG: at 20:04

## 2025-04-24 RX ADMIN — CLONIDINE HYDROCHLORIDE 0.3 MG: 0.1 TABLET ORAL at 11:48

## 2025-04-24 RX ADMIN — MONTELUKAST SODIUM 10 MG: 10 TABLET, COATED ORAL at 20:04

## 2025-04-24 RX ADMIN — ALPRAZOLAM 0.25 MG: 0.25 TABLET ORAL at 20:04

## 2025-04-24 RX ADMIN — ISOSORBIDE DINITRATE 10 MG: 10 TABLET ORAL at 11:48

## 2025-04-24 RX ADMIN — CLONIDINE HYDROCHLORIDE 0.3 MG: 0.1 TABLET ORAL at 20:03

## 2025-04-24 RX ADMIN — SODIUM CHLORIDE: 9 INJECTION, SOLUTION INTRAVENOUS at 15:24

## 2025-04-24 RX ADMIN — CARVEDILOL 25 MG: 25 TABLET, FILM COATED ORAL at 20:04

## 2025-04-24 RX ADMIN — TAMSULOSIN HYDROCHLORIDE 0.4 MG: 0.4 CAPSULE ORAL at 20:04

## 2025-04-24 RX ADMIN — SODIUM BICARBONATE 650 MG: 650 TABLET ORAL at 18:12

## 2025-04-24 RX ADMIN — SODIUM BICARBONATE 650 MG: 650 TABLET ORAL at 20:05

## 2025-04-24 RX ADMIN — HYDRALAZINE HYDROCHLORIDE 100 MG: 50 TABLET ORAL at 11:48

## 2025-04-24 RX ADMIN — CARVEDILOL 25 MG: 25 TABLET, FILM COATED ORAL at 11:48

## 2025-04-24 RX ADMIN — NIFEDIPINE 120 MG: 60 TABLET, FILM COATED, EXTENDED RELEASE ORAL at 11:48

## 2025-04-24 RX ADMIN — TRAMADOL HYDROCHLORIDE 50 MG: 50 TABLET, COATED ORAL at 20:04

## 2025-04-24 NOTE — PROGRESS NOTES
Nephrology ([facility]) Progress Note      Patient:  Ricardo Hugo  YOB: 1950  Date of Service: 4/24/2025  MRN: [id number]   Acct: [id number]   Primary Care Physician: Nathan Zimmer MD  Advance Directive:   Code Status and Medical Interventions: No CPR (Do Not Attempt to Resuscitate); Limited Support; No cardioversion, No intubation (DNI)   Ordered at: 04/20/25 1702     Code Status (Patient has no pulse and is not breathing):    No CPR (Do Not Attempt to Resuscitate)     Medical Interventions (Patient has pulse or is breathing):    Limited Support     Medical Intervention Limits:    No cardioversion       No intubation (DNI)     Level Of Support Discussed With:    Patient     Admit Date: 4/20/2025       Hospital Day: 4  Referring Provider: No Known Provider      Patient personally seen and examined.  Complete chart including Consults, Notes, Operative Reports, Labs, Cardiology, and Radiology studies reviewed as able.        Subjective:  Ricardo Hugo is a 75 y.o. male for whom we were consulted for evaluation and treatment of chronic kidney disease stage 5 with progression to ESRD. Patient has recently been preparing to start hemodialysis. Had permcath placed on 4/16 and was due to start dialysis this week. Presented to ER on 4/20 with severe right lower extremity pain. Labs in ER showed creatinine 4.5 which is similar to his recent baseline. Was found to be significantly anemic with hemoglobin of 6.6. potassium elevated at 6.6. Admitted to medical floor. Received PRBC transfusion. Completed first HD on 4/21 and tolerated well. Second HD provided on 4/22. Third HD on 4/23 was also tolerated well    Today is awake and alert. No new complaints.  Permcath site has continued to ooze, requiring multiple dressing changes last 24 hours.      Allergies:  Ondansetron, Zofran [ondansetron hcl], Lortab [hydrocodone-acetaminophen], and Allopurinol    Home  Meds:  Facility-Administered Medications Prior to Admission   Medication Dose Route Frequency Provider Last Rate Last Admin    cyanocobalamin injection 1,000 mcg  1,000 mcg Intramuscular Q28 Days Ruthie Parra, APRN   1,000 mcg at 08/11/23 1123     Medications Prior to Admission   Medication Sig Dispense Refill Last Dose/Taking    albuterol sulfate  (90 Base) MCG/ACT inhaler Inhale 2 puffs Every 6 (Six) Hours As Needed for Wheezing or Shortness of Air.   Past Week    ALPRAZolam (XANAX) 0.25 MG tablet Take 1 tablet by mouth Every Night.   Past Week    aspirin (aspirin) 81 MG EC tablet Take 1 tablet by mouth Daily.   Past Week    atorvastatin (LIPITOR) 10 MG tablet Take 1 tablet by mouth Daily. 90 tablet 3 Past Week    calcitriol (ROCALTROL) 0.5 MCG capsule Take 1 capsule by mouth Daily. 90 capsule 2 Past Week    carvedilol (COREG) 25 MG tablet Take 1 tablet by mouth 2 (Two) Times a Day With Meals.   Past Week    cholestyramine light 4 g packet Take 1 packet by mouth Daily. 90 packet 1 Past Week    cloNIDine (CATAPRES) 0.3 MG tablet Take 1 tablet by mouth 3 (Three) Times a Day. 270 tablet 2 Past Week    cloNIDine (CATAPRES-TTS) 0.2 MG/24HR patch Place 1 patch on the skin as directed by provider 1 (One) Time Per Week. THURSDAYS   Past Week    clopidogrel (PLAVIX) 75 MG tablet Take 1 tablet by mouth Daily.   Past Week    Copper Gluconate (Copper Caps) 2 MG capsule Take 2 mg by mouth Daily.   Past Week    docusate sodium (COLACE) 100 MG capsule Take 1 capsule by mouth 3 (Three) Times a Day As Needed for Constipation.   Past Week    Epoetin Anselmo (PROCRIT IJ) Inject 1 dose as directed 1 (One) Time Per Week. Monday   Past Month    fexofenadine (ALLEGRA) 180 MG tablet Take 1 tablet by mouth Daily.   Past Week    fluticasone (FLONASE) 50 MCG/ACT nasal spray Administer 2 sprays into the nostril(s) as directed by provider Daily.   Past Week    furosemide (Lasix) 20 MG tablet Take 0.5 tablets by mouth Daily.   Past  Week    furosemide (LASIX) 40 MG tablet Take 1 tablet by mouth Daily. 90 tablet 2 Past Week    hydrALAZINE (APRESOLINE) 100 MG tablet Take 1 tablet by mouth 3 (Three) Times a Day. 100mg daily   Past Week    isosorbide dinitrate (ISORDIL) 10 MG tablet Take 1 tablet by mouth 3 (Three) Times a Day. 270 tablet 2 Past Week    levothyroxine (Synthroid) 100 MCG tablet Take 1 tablet by mouth Every Morning. 90 tablet 2 Past Week    magnesium chloride ER 64 MG DR tablet Take 1 tablet by mouth Daily.   Past Week    melatonin 5 MG tablet tablet Take 2 tablets by mouth Every Night. (Patient taking differently: Take 3 tablets by mouth Every Night. Patient taking 3 tablets per night)   Past Week    mesalamine (APRISO) 0.375 g 24 hr capsule Take 4 capsules by mouth Daily. 360 capsule 1 Past Week    montelukast (SINGULAIR) 10 MG tablet Take 1 tablet by mouth Every Night.   Past Week    Multiple Vitamins-Minerals (PRESERVISION/LUTEIN) capsule Take 1 capsule by mouth 2 (two) times a day.   Past Week    NIFEdipine CC (ADALAT CC) 60 MG 24 hr tablet Take 2 tablets by mouth Daily for 270 days.   Past Week Bedtime    NON FORMULARY Take 25 mg by mouth At Night As Needed. Zzzquill   Past Week    omeprazole (priLOSEC) 20 MG capsule Take 1 capsule by mouth Daily.   Past Week    sodium bicarbonate 650 MG tablet Take 1 tablet by mouth 5 (Five) Times a Day.   Past Week    spironolactone (ALDACTONE) 25 MG tablet Take 1 tablet by mouth Daily.   Past Week    tamsulosin (FLOMAX) 0.4 MG capsule 24 hr capsule Take 1 capsule by mouth Every Night.   Past Week    traMADol (ULTRAM) 50 MG tablet Take 1 tablet by mouth Every 6 (Six) Hours As Needed for Moderate Pain. 20 tablet 0 Past Week    valsartan (DIOVAN) 320 MG tablet Take 1 tablet by mouth Daily.   Past Week    vitamin D (ERGOCALCIFEROL) 1.25 MG (73525 UT) capsule capsule Take 1 capsule by mouth 1 (One) Time Per Week.   Past Week       Medicines:  Current Facility-Administered Medications   Medication  Dose Route Frequency Provider Last Rate Last Admin    acetaminophen (TYLENOL) 160 MG/5ML oral solution 650 mg  650 mg Oral Q4H PRN Katherine Camarena APRN        Or    acetaminophen (TYLENOL) suppository 650 mg  650 mg Rectal Q4H PRN Katherine Camarena, APRN        acetaminophen (TYLENOL) tablet 1,000 mg  1,000 mg Oral Q6H PRN Katherine Camarena, APRN   1,000 mg at 04/23/25 1341    ALPRAZolam (XANAX) tablet 0.25 mg  0.25 mg Oral Nightly Katherine Camarena APRN   0.25 mg at 04/23/25 2001    aspirin EC tablet 81 mg  81 mg Oral Daily Katherine Camarena APRN   81 mg at 04/23/25 1001    atorvastatin (LIPITOR) tablet 10 mg  10 mg Oral Daily Katherine Camarena, APRN   10 mg at 04/23/25 1001    sennosides-docusate (PERICOLACE) 8.6-50 MG per tablet 2 tablet  2 tablet Oral BID PRN Katherine Camarena APRN        And    polyethylene glycol (MIRALAX) packet 17 g  17 g Oral Daily PRN Katherine Camarena APRSHERITA        And    bisacodyl (DULCOLAX) EC tablet 5 mg  5 mg Oral Daily PRN Katherine Camarena APRN        And    bisacodyl (DULCOLAX) suppository 10 mg  10 mg Rectal Daily PRN Katherine Camarena, APRN        calcitriol (ROCALTROL) capsule 0.5 mcg  0.5 mcg Oral Daily Katherine Camarena APRN   0.5 mcg at 04/23/25 1001    carvedilol (COREG) tablet 25 mg  25 mg Oral Q12H Katherine Camarena, APRN   25 mg at 04/23/25 2001    cetirizine (zyrTEC) tablet 10 mg  10 mg Oral Daily Katherine Camarena APRN   10 mg at 04/23/25 1001    cloNIDine (CATAPRES) tablet 0.3 mg  0.3 mg Oral TID Katherine Camarena APRN   0.3 mg at 04/23/25 2001    cloNIDine (CATAPRES-TTS) 0.2 MG/24HR patch 1 patch  1 patch Transdermal Weekly Katherine Camarena APRN        clopidogrel (PLAVIX) tablet 75 mg  75 mg Oral Daily Katherine Camarena APRN   75 mg at 04/23/25 1001    furosemide (LASIX) tablet 40 mg  40 mg Oral BID Katherine Camarena APRN   40 mg at 04/23/25 2001    heparin (porcine) injection 3,600 Units  3,600 Units Intracatheter PRN Bolivar Rueda MD   3,600  Units at 04/23/25 1715    hydrALAZINE (APRESOLINE) tablet 100 mg  100 mg Oral TID Katherine Camarena APRN   100 mg at 04/23/25 2001    isosorbide dinitrate (ISORDIL) tablet 10 mg  10 mg Oral TID - Nitrates Katherine Camarena, APRN   10 mg at 04/23/25 1823    levothyroxine (SYNTHROID, LEVOTHROID) tablet 100 mcg  100 mcg Oral Q AM Katherine Camarena APRN   100 mcg at 04/23/25 0609    melatonin tablet 10 mg  10 mg Oral Nightly Katherine Camarena, APRN   10 mg at 04/23/25 2001    mesalamine (APRISO) 24 hr capsule 1.5 g  1.5 g Oral Daily Katherine Camarena APRN   1.5 g at 04/23/25 1000    montelukast (SINGULAIR) tablet 10 mg  10 mg Oral Nightly Katherine Camarena APRN   10 mg at 04/23/25 2001    NIFEdipine XL (PROCARDIA XL) 24 hr tablet 120 mg  120 mg Oral Daily Katherine Camarena APRN   120 mg at 04/22/25 1245    pantoprazole (PROTONIX) EC tablet 40 mg  40 mg Oral Q AM Katherine Camarena APRN   40 mg at 04/23/25 0610    promethazine (PHENERGAN) tablet 12.5 mg  12.5 mg Oral Q6H PRN Katherine Camarena APRN   12.5 mg at 04/20/25 2305    sodium bicarbonate tablet 650 mg  650 mg Oral 5x Daily Katherine Camarena APRN   650 mg at 04/23/25 2003    tamsulosin (FLOMAX) 24 hr capsule 0.4 mg  0.4 mg Oral Nightly Katherine Camarena APRN   0.4 mg at 04/23/25 2000    traMADol (ULTRAM) tablet 50 mg  50 mg Oral BID PRN Donal Rodriguez MD   50 mg at 04/23/25 1825       Past Medical History:  Past Medical History:   Diagnosis Date    3-vessel CAD 08/11/2020    Allergic rhinitis     Anemia     Anxiety disorder 04/27/2020    Arthritis     Asymmetrical sensorineural hearing loss 06/28/2017    Atherosclerosis of native artery of both lower extremities with intermittent claudication 07/18/2019    Avascular necrosis of femoral head, left 07/11/2020    right hip after surgery    Carotid stenosis     Chronic mucoid otitis media     Chronic rhinitis     COPD (chronic obstructive pulmonary disease)     Coronary artery disease     HEART  BYPASS 2004    Crohn's disease of large intestine with other complication 07/30/2020    Chronic diarrhea Colonoscopy July 2020 revealed mild patchy scattered hemosiderin staining with inflammation more so in rectosigmoid area.  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    Iron deficiency anemia 07/14/2020    Mixed hearing loss of left ear     PAD (peripheral artery disease) 08/11/2020    Perianal abscess     Pernicious anemia 08/17/2020    took shots but never diagnosed with b12 deficiency    Personal history of alcoholism 08/11/2020    quit drinking in 2013    Prostatic hypertrophy 08/11/2020    Sensorineural hearing loss     Sepsis with acute renal failure 09/15/2020    Shortness of breath 05/27/2021    Tinnitus     Ventricular tachycardia, nonsustained 07/14/2020    Weight loss 07/11/2020       Past Surgical History:  Past Surgical History:   Procedure Laterality Date    ARTERY SURGERY  2021    right side on neck    CAROTID ENDARTERECTOMY Right 05/10/2021    Procedure: RIGHT CAROTID ENDARTERECTOMY WITH EEG;  Surgeon: Gil Pineda DO;  Location: [facility] OR ;  Service: Vascular;  Laterality: Right;    COLONOSCOPY N/A 07/02/2020    Procedure: COLONOSCOPY WITH ANESTHESIA;  Surgeon: Adrien Brewster MD;  Location: [facility] ENDOSCOPY;  Service: Gastroenterology;  Laterality: N/A;  pre op: diarrhea  post op: polyps  PCP: Joe Velasco MD    COLONOSCOPY N/A 10/13/2020    Procedure: COLONOSCOPY WITH ANESTHESIA;  Surgeon: Adrien Brewster MD;  Location: [facility] ENDOSCOPY;  Service: Gastroenterology;  Laterality: N/A;  Pre: Chronic Diarrhea,  Crohn's  Post: AVM  Dr. Neftali Velasco  CO2 Inflation Used    COLONOSCOPY N/A 12/08/2023    Procedure: COLONOSCOPY WITH ANESTHESIA;  Surgeon: Adrien Brewster MD;  Location: [facility] ENDOSCOPY;  Service: Gastroenterology;  Laterality: N/A;  pre chrone's disease  post sub optimal prep, polyp, chrone's      CORONARY ARTERY BYPASS GRAFT  2003    x3    ENDOSCOPY N/A 11/02/2021    Procedure: ESOPHAGOGASTRODUODENOSCOPY WITH ANESTHESIA;  Surgeon: Bridger Bell MD;  Location: [facility] ENDOSCOPY;  Service: Gastroenterology;  Laterality: N/A;  pre anemia;gi bleed  post  gi bleed;schatski ring  Dr. ERIC Velasco    ENDOSCOPY N/A 10/10/2023    Procedure: ESOPHAGOGASTRODUODENOSCOPY WITH ANESTHESIA;  Surgeon: Adrien Brewster MD;  Location: [facility] ENDOSCOPY;  Service: Gastroenterology;  Laterality: N/A;  preop; anemia  postop esophagitis ; R/O barretts   PCP Randall Beata    EYE SURGERY Bilateral     catorac    INCISION AND DRAINAGE PERIRECTAL ABSCESS N/A 03/03/2017    Procedure: INCISION AND DRAINAGE OF JEET ANAL ABSCESS;  Surgeon: Lynette Smith MD;  Location: [facility] OR;  Service:     INGUINAL HERNIA REPAIR Bilateral 06/27/2023    Procedure: INGUINAL HERNIA BILATERAL REPAIR LAPAROSCOPIC WITH DAVINCI ROBOT WITH MESH;  Surgeon: Tahira Rivera MD;  Location: [facility] OR;  Service: Robotics - DaVinci;  Laterality: Bilateral;    INSERTION HEMODIALYSIS CATHETER N/A 4/16/2025    Procedure: HEMODIALYSIS CATHETER PLACEMENT;  Surgeon: Gil Pineda DO;  Location: [facility] HYBRID OR;  Service: Vascular;  Laterality: N/A;    MYRINGOTOMY W/ TUBES Left 04/17/2017    06/10/2016    TONSILLECTOMY      TOTAL HIP ARTHROPLASTY Right 2006       Family History  Family History   Problem Relation Age of Onset    Breast cancer Mother     Dementia Father     Glaucoma Father     No Known Problems Daughter     Colon polyps Neg Hx     Colon cancer Neg Hx        Social History  Social History     Socioeconomic History    Marital status:     Tobacco Use    Smoking status: Former     Current packs/day: 0.00     Average packs/day: 0.5 packs/day for 25.8 years (12.9 ttl pk-yrs)     Types: Cigarettes     Start date:      Quit date: 10/13/2013     Years since quittin.5     Passive exposure: Past    Smokeless tobacco: Never    Tobacco comments:     quit 2013   Vaping Use    Vaping status: Former    Substances: Nicotine    Devices: Pre-filled or refillable cartridge   Substance and Sexual Activity    Alcohol use: Not Currently    Drug use: No    Sexual activity: Not Currently     Partners: Female       Review of Systems:  History obtained from chart review and the patient  General ROS: No fever or chills  Respiratory ROS: No cough, shortness of breath, wheezing  Cardiovascular ROS: No chest pain or palpitations  Gastrointestinal ROS: No abdominal pain or melena  Genito-Urinary ROS: No dysuria or hematuria  Psych ROS: No anxiety and depression  14 point ROS reviewed with the patient and negative except as noted above and in the HPI unless unable to obtain.    Objective:  Patient Vitals for the past 24 hrs:   BP Temp Temp src Pulse Resp SpO2 Weight   25 0724 172/44 97.7 °F (36.5 °C) Oral 107 16 98 % --   25 0634 -- -- -- -- -- -- 60.1 kg (132 lb 9.6 oz)   25 0413 179/51 98 °F (36.7 °C) Oral 60 16 97 % --   25 0108 177/42 -- -- 57 -- -- --   25 2320 (!) 197/51 98.2 °F (36.8 °C) Oral 69 16 96 % --   25 160/56 98.1 °F (36.7 °C) Oral 69 16 95 % --   25 1839 (!) 192/46 97.8 °F (36.6 °C) Oral 65 16 96 % --   25 1148 173/53 97.8 °F (36.6 °C) Oral 56 16 96 % --       Intake/Output Summary (Last 24 hours) at 2025 0954  Last data filed at 2025 0200  Gross per 24 hour   Intake --   Output 2000 ml   Net -2000 ml     General: awake/alert   Chest:  clear to auscultation bilaterally without respiratory distress  CVS: regular rate and rhythm  Abdominal: soft, nontender, positive bowel  sounds  Extremities: no cyanosis or edema  Skin: warm and dry without rash      Labs:  Results from last 7 days   Lab Units 04/24/25  0504 04/23/25  0619 04/22/25  0245   WBC 10*3/mm3 4.20 6.06 4.42   HEMOGLOBIN g/dL 9.9* 9.6* 8.2*   HEMATOCRIT % 31.5* 30.9* 27.1*   PLATELETS 10*3/mm3 100* 124* 97*         Results from last 7 days   Lab Units 04/24/25  0504 04/23/25  0619 04/22/25  0245 04/21/25  0850 04/20/25  1732 04/20/25  1400   SODIUM mmol/L 135* 136 138 137  137   < >  --    POTASSIUM mmol/L 4.2 4.1 4.4 4.9  4.9   < >  --    CHLORIDE mmol/L 99 99 101 105  105   < >  --    CO2 mmol/L 25.0 27.0 25.0 17.0*  17.0*   < >  --    BUN mg/dL 36* 44* 55* 99*  99*   < >  --    CREATININE mg/dL 2.87* 2.80* 3.50* 4.35*  4.35*   < >  --    CALCIUM mg/dL 8.5* 8.3* 8.3* 8.5*  8.5*   < >  --    EGFR mL/min/1.73 21.9* 22.5* 17.2* 13.3*  13.3*   < >  --    BILIRUBIN mg/dL  --   --   --  0.2  --  0.2   ALK PHOS U/L  --   --   --  145*  --  176*   ALT (SGPT) U/L  --   --   --  10  --  10   AST (SGOT) U/L  --   --   --  14  --  17   GLUCOSE mg/dL 89 100* 93 107*  107*   < >  --     < > = values in this interval not displayed.       Radiology:   Imaging Results (Last 72 Hours)       Procedure Component Value Units Date/Time    CT Angio Abdominal Aorta Bilateral Iliofem Runoff [[id number]] Collected: 04/21/25 1621     Updated: 04/21/25 1636    Narrative:      EXAM/TECHNIQUE: CT angiography with 3D MIP images abdomen and pelvis  with bilateral lower extremity runoff     INDICATION: aortoiliac disease right lower extremity ischemia;  N18.9-Chronic kidney disease, unspecified; E87.5-Hyperkalemia;  D64.9-Anemia, unspecified; M54.16-Radiculopathy, lumbar region;  I73.9-Peripheral vascular disease, unspecified     COMPARISON: 9/15/2020     DLP: 479.06 mGy.cm. Automated exposure control was utilized to decrease  patient radiation dose.     FINDINGS:     Nonaneurysmal abdominal aorta with heavy atherosclerotic calcification.  Celiac  artery SMA, and MIKI are widely patent. Mild atherosclerotic  narrowing of the left main renal artery. There are 2 renal arteries  bilaterally.     The right common iliac and internal iliac arteries are widely patent.  Focal moderate to severe atherosclerotic stenosis of the right external  iliac artery on series 4 image 155. The right common femoral artery is  widely patent and contains heavy atherosclerotic calcification. Right  profunda is widely patent. Heavy atherosclerotic calcification  throughout the right SFA with multifocal areas of moderate stenosis.  Patent atherosclerotic popliteal artery. Three-vessel right lower  extremity runoff.     The left common iliac and internal iliac artery are widely patent.  Moderate atherosclerotic stenosis of the left external iliac artery on  series 4 image 159. Left common femoral artery is widely patent. Left  profunda is widely patent. Heavy atherosclerotic calcification  throughout the SFA with multifocal areas of proximal stenosis. The left  SFA appears occluded along its mid course (series 4 image 297) with  reconstitution occurring at the distal vessel. After reconstitution,  there is an area of severe left SFA stenosis on series 4 image 359.  Patent left popliteal artery. Three-vessel left lower extremity runoff  appears maintained.     Nonvascular findings:     Moderate right pleural effusion with overlying atelectasis. Trace left  pleural effusion. Cardiomegaly. Liver appears cirrhotic. 9 mm arterial  enhancing lesion in the left liver on image 54. No gallstone or biliary  ductal dilatation. The pancreas, spleen, and adrenal glands are  unremarkable. Multifocal bilateral renal cortical scarring. No  hydronephrosis or large calculi. No focal urinary bladder wall  thickening.      Moderate volume stool throughout the colon. No colonic wall thickening  or pericolonic fat stranding. No small bowel distention or inflammation.     No evidence of ascites. There is  diffuse edematous change throughout the  mesentery/peritoneum. No pelvic mass or collection. Prostate is enlarged  measuring 5.3 cm transverse dimension. No pathologically enlarged  abdominal or pelvic lymph nodes.     Diffuse abdominal wall soft tissue edema. Right hip arthroplasty. Left  femoral head avascular necrosis with areas of subchondral collapse  noted.       Impression:         1.  Nonaneurysmal abdominal aorta with heavy atherosclerotic  calcification throughout.     2.  Focal moderate-severe atherosclerotic stenosis of the right external  iliac artery. Heavy atherosclerotic calcification throughout the right  SFA with multifocal areas of moderate stenosis. Three-vessel right lower  extremity runoff.     3.  Moderate atherosclerotic stenosis of the left external iliac artery.  Heavy atherosclerotic calcification throughout the left SFA with  multifocal moderate stenosis proximally and complete occlusion of the  mid vessel with reconstitution distally. After reconstitution, there is  an area of focal severe left SFA stenosis. Three-vessel left lower  extremity runoff appears maintained.     4.  Moderate right pleural effusion. Cardiomegaly.     5.  Liver appears cirrhotic. 9 mm enhancing lesion in the left liver on  image 54 series 4. Recommend follow-up liver protocol CT or MRI for  further characterization.     6.  Anasarca with diffuse abdominal wall soft tissue edema and extensive  diffuse edematous change throughout the mesentery and peritoneum.     7.  Advanced left femoral head avascular necrosis.           This report was signed and finalized on 4/21/2025 4:33 PM by Dr. Wellington Farley MD.       US Arterial Doppler Lower Extremity Complete [[id number]] Collected: 04/21/25 1419     Updated: 04/21/25 1425    Narrative:      History: PAD     Comments: Grayscale imaging as well as color flow duplex were used to  evaluate bilateral lower extremity arterial systems.     On the right, the peak systolic  "velocity in the common femoral artery  measured 31.7 cm/s. In the profunda femoris artery measured 50.3 cm/s.  The proximal SFA appears occluded. The mid SFA measured 20.1 cm/s. In  the distal SFA measured 48.6 cm/s. In the popliteal artery measured 25.3  cm/s. In the posterior tibial artery measured 20.7 cm/s. In the anterior  tibial artery measured 35.1 cm/s.     On the left, the peak systolic velocity in the common femoral artery  measured 103.2 cm/s. In the profunda femoris artery measured 123.3 cm/s.  In the proximal SFA measured 26.5 cm/s. The mid SFA appears occluded. In  the distal SFA measured 27.3 cm/s. In the popliteal artery measured 16.3  cm/s. In the posterior tibial artery measured 55.8 cm/s. In the anterior  tibial artery measured 42.5 cm/s.       Impression:      In the right lower extremity, there is heavy plaque burden  in the common femoral artery with an occluded proximal SFA. There is  reconstitution in the mid SFA but dampened flow throughout the lower  extremity with heavy plaque burden throughout. In the left lower  extremity, there is also heavy plaque burden in the common femoral  artery with an occluded mid SFA. There is reconstitution in the distal  SFA with dampened flow throughout the lower extremity with heavy plaque  burden throughout. Further imaging/evaluation may be warranted.     This report was signed and finalized on 4/21/2025 2:22 PM by Dr. Gil Pineda MD.               Culture:  No results found for: \"BLOODCX\", \"URINECX\", \"WOUNDCX\", \"MRSACX\", \"RESPCX\", \"STOOLCX\"      Assessment    Chronic kidney disease with progression to end stage renal disease  Hypertension  Hyperkalemia--improved  Severe anemia--s/p PRBC transfusion on 4/20  Secondary hyperparathyroidism  Metabolic acidosis    Plan:  Planning for angiogram today.    Dialysis next due 4/25  Outpatient HD chair time arranged. He can start Friday, 4/25 at [facility]. OK for discharge from renal " standpoint when OK with others. Follow up will be via dialysis clinic      Slava Tate, APRN  4/24/2025  09:54 CDT

## 2025-04-24 NOTE — PROGRESS NOTES
LOS: 4 days   Patient Care Team:  Nathan Zimmer MD as PCP - General (Internal Medicine)  Adrien Brewster MD as Consulting Physician (Gastroenterology)  Eleuterio Farley MD (Inactive) as Cardiologist (Cardiology)  Elena Jon APRN as Referring Physician (Vascular Surgery)  Bolivar Rueda MD as Consulting Physician (Nephrology)  Slava Tate APRN as Nurse Practitioner (Family Medicine)  New Omalley MD as Consulting Physician (Pulmonary Disease)  Becky Camacho APRN as Nurse Practitioner (Hematology and Oncology)  Gil Pineda DO as Consulting Physician (Vascular Surgery)    Chief Complaint: Right lower extremity rest pain    Subjective     Patient Complaints: Patient continues to have right lower extremity rest pain.  He is doing well after dialysis treatments.  His surgery was canceled today, and will be performed tomorrow at which time he should be able to discharge from vascular surgery standpoint.  Right lower extremity angiogram tomorrow.Risks of angiogram were discussed.  These include, but are not limited to, bleeding, infection, vessel damage, nerve damage, embolus, and loss of limb.  The patient understands these risks and wishes to proceed with procedure.      Objective     Vital Signs  Temp:  [97.1 °F (36.2 °C)-98.2 °F (36.8 °C)] 97.7 °F (36.5 °C)  Heart Rate:  [] 52  Resp:  [16] 16  BP: (106-204)/(36-59) 151/36    Physical Exam  Vitals and nursing note reviewed.   Constitutional:       General: He is not in acute distress.     Appearance: Normal appearance. He is underweight. He is not diaphoretic.   HENT:      Head: Normocephalic. No right periorbital erythema or left periorbital erythema.      Nose: Nose normal.   Eyes:      General: No scleral icterus.     Pupils: Pupils are equal.   Cardiovascular:      Rate and Rhythm: Normal rate and regular rhythm.      Pulses: Normal pulses.   Pulmonary:      Effort: Pulmonary effort is normal.  No respiratory distress.   Abdominal:      General: Bowel sounds are normal. There is no distension.      Palpations: Abdomen is soft.      Tenderness: There is no abdominal tenderness. There is no guarding.   Musculoskeletal:         General: No swelling or tenderness. Normal range of motion.      Cervical back: Normal range of motion and neck supple.      Right lower leg: No edema.      Left lower leg: No edema.   Feet:      Right foot:      Skin integrity: Skin integrity normal.      Left foot:      Skin integrity: Skin integrity normal.   Skin:     General: Skin is warm and dry.      Findings: No erythema or rash.             Comments: Andrey and cool right toes   Neurological:      General: No focal deficit present.      Mental Status: He is alert and oriented to person, place, and time. Mental status is at baseline.      Cranial Nerves: No cranial nerve deficit.      Gait: Gait normal.   Psychiatric:         Attention and Perception: Attention normal.         Mood and Affect: Mood normal.         Behavior: Behavior normal.         Thought Content: Thought content normal.         Judgment: Judgment normal.         Laboratory Data:   Results from last 7 days   Lab Units 04/24/25  0504 04/23/25  0619 04/22/25  0245   WBC 10*3/mm3 4.20 6.06 4.42   HEMOGLOBIN g/dL 9.9* 9.6* 8.2*   HEMATOCRIT % 31.5* 30.9* 27.1*   PLATELETS 10*3/mm3 100* 124* 97*       Results from last 7 days   Lab Units 04/24/25  0504 04/23/25  0619 04/22/25  0245 04/21/25  0850 04/20/25  1732 04/20/25  1400   SODIUM mmol/L 135* 136 138 137  137   < >  --    POTASSIUM mmol/L 4.2 4.1 4.4 4.9  4.9   < >  --    CHLORIDE mmol/L 99 99 101 105  105   < >  --    CO2 mmol/L 25.0 27.0 25.0 17.0*  17.0*   < >  --    BUN mg/dL 36* 44* 55* 99*  99*   < >  --    CREATININE mg/dL 2.87* 2.80* 3.50* 4.35*  4.35*   < >  --    CALCIUM mg/dL 8.5* 8.3* 8.3* 8.5*  8.5*   < >  --    BILIRUBIN mg/dL  --   --   --  0.2  --  0.2   ALK PHOS U/L  --   --   --  145*   --  176*   ALT (SGPT) U/L  --   --   --  10  --  10   AST (SGOT) U/L  --   --   --  14  --  17   GLUCOSE mg/dL 89 100* 93 107*  107*   < >  --     < > = values in this interval not displayed.                Medication Review: Reviewed    Assessment & Plan       Anemia, multifactorial to include chronic kidney disease, iron deficiency and possible bleed    Resistant hypertension    PAD (peripheral artery disease)    CKD (chronic kidney disease) stage 5    Acute pain of lower extremity, right    Hyperkalemia          Plan for disposition:  Right lower extremity angiogram tomorrow  N.p.o. after midnight    STERLING Sena  Vascular Surgery  [phone]  04/24/25  16:55 CDT

## 2025-04-24 NOTE — PLAN OF CARE
Goal Outcome Evaluation:  Plan of Care Reviewed With: patient      Progress: no change     Pt A&O x4. No c/o pain this shift thus far. Voiding per urinal. R permacath intact w/ some drainage noted. NPO since midnight for procedure later today. Patient remains hypertensive; otherwise, VSS. Safety maintained.

## 2025-04-24 NOTE — PROGRESS NOTES
Patient Name: Ricardo Hugo  Date of Admission: 4/20/2025  Today's Date: 04/24/25  Length of Stay: 4  Primary Care Physician: Nathan Zimmer MD    Subjective   Chief Complaint: Right lower extremity pain  Leg Pain       Patient has no new complaint, feeling a lot better.  We discussed discharge planning, patient wants to go home, patient's spouse was at bedside.      Documented weights    04/20/25 0750 04/21/25 0501 04/22/25 0453 04/23/25 0600   Weight: 60.3 kg (133 lb) 67.5 kg (148 lb 12.8 oz) 61.6 kg (135 lb 14.4 oz) 60.9 kg (134 lb 3.2 oz)    04/24/25 0634   Weight: 60.1 kg (132 lb 9.6 oz)        Intake/Output Summary (Last 24 hours) at 4/24/2025 1630  Last data filed at 4/24/2025 1137  Gross per 24 hour   Intake --   Output 2400 ml   Net -2400 ml       Results for orders placed during the hospital encounter of 01/10/25    Adult Transthoracic Echo Complete w/ Color, Spectral and Contrast if Necessary Per Protocol    Interpretation Summary    Left ventricular systolic function is normal. Left ventricular ejection fraction appears to be 56 - 60%.    Left ventricular wall thickness is consistent with mild septal asymmetric hypertrophy.    Left ventricular diastolic function is consistent with (grade II w/high LAP) pseudonormalization.    Normal right ventricular cavity size and systolic function noted.    The left atrial cavity is mild to moderately dilated.    The right atrial cavity is dilated.    Mild aortic valve stenosis is present.    Mild to moderate mitral valve regurgitation is present.    Moderate to severe tricuspid valve regurgitation is present.    Estimated right ventricular systolic pressure from tricuspid regurgitation is markedly elevated (>55 mmHg).       Review of Systems   All pertinent negatives and positives are as above. All other systems have been reviewed and are negative unless otherwise stated.     Objective    Temp:  [97.1 °F (36.2 °C)-98.2 °F (36.8 °C)] 97.7 °F (36.5 °C)  Heart  Rate:  [] 52  Resp:  [16] 16  BP: (106-204)/(36-59) 151/36  Physical Exam  Vitals and nursing note reviewed.   Constitutional:       Appearance: He is ill-appearing (Chronic).   HENT:      Head: Normocephalic and atraumatic.      Mouth/Throat:      Mouth: Mucous membranes are moist.      Pharynx: Oropharynx is clear.   Eyes:      Extraocular Movements: Extraocular movements intact.      Conjunctiva/sclera: Conjunctivae normal.   Cardiovascular:      Rate and Rhythm: Normal rate and regular rhythm.      Heart sounds: Murmur heard.   Pulmonary:      Effort: Pulmonary effort is normal.      Breath sounds: Normal breath sounds.   Abdominal:      General: There is no distension.      Palpations: Abdomen is soft.   Musculoskeletal:      Cervical back: Normal range of motion and neck supple.      Right lower leg: No edema.      Left lower leg: No edema.   Skin:     General: Skin is warm and dry.      Comments: Bilateral lower extremities dusky, Multiple bruises scattered.  Oozing blood noted at right permacath site-did not remove dressing to evaluate.  Nurses have been changing frequently   Neurological:      General: No focal deficit present.      Mental Status: He is alert and oriented to person, place, and time.   Psychiatric:         Mood and Affect: Mood normal.         Behavior: Behavior normal.       Results Review:  I have reviewed the labs, radiology results, and diagnostic studies.    Laboratory Data:   Results from last 7 days   Lab Units 04/24/25  0504 04/23/25  0619 04/22/25  0245   WBC 10*3/mm3 4.20 6.06 4.42   HEMOGLOBIN g/dL 9.9* 9.6* 8.2*   HEMATOCRIT % 31.5* 30.9* 27.1*   PLATELETS 10*3/mm3 100* 124* 97*        Results from last 7 days   Lab Units 04/24/25  0504 04/23/25  0619 04/22/25  0245 04/21/25  0850 04/20/25  1732 04/20/25  1400   SODIUM mmol/L 135* 136 138 137  137   < >  --    POTASSIUM mmol/L 4.2 4.1 4.4 4.9  4.9   < >  --    CHLORIDE mmol/L 99 99 101 105  105   < >  --    CO2 mmol/L  25.0 27.0 25.0 17.0*  17.0*   < >  --    BUN mg/dL 36* 44* 55* 99*  99*   < >  --    CREATININE mg/dL 2.87* 2.80* 3.50* 4.35*  4.35*   < >  --    CALCIUM mg/dL 8.5* 8.3* 8.3* 8.5*  8.5*   < >  --    BILIRUBIN mg/dL  --   --   --  0.2  --  0.2   ALK PHOS U/L  --   --   --  145*  --  176*   ALT (SGPT) U/L  --   --   --  10  --  10   AST (SGOT) U/L  --   --   --  14  --  17   GLUCOSE mg/dL 89 100* 93 107*  107*   < >  --     < > = values in this interval not displayed.       Culture Data:     Microbiology Results (last 10 days)       Procedure Component Value - Date/Time    Eosinophil Smear - Urine, Urine, Clean Catch [[id number]]  (Normal) Collected: 04/20/25 1509    Lab Status: Final result Specimen: Urine, Clean Catch Updated: 04/21/25 0020     Eosinophil Smear 0 % EOS/100 Cells              Radiology Data:   Imaging Results (Last 7 Days)       Procedure Component Value Units Date/Time    CT Angio Abdominal Aorta Bilateral Iliofem Runoff [[id number]] Collected: 04/21/25 1621     Updated: 04/21/25 1636    Narrative:      EXAM/TECHNIQUE: CT angiography with 3D MIP images abdomen and pelvis  with bilateral lower extremity runoff     INDICATION: aortoiliac disease right lower extremity ischemia;  N18.9-Chronic kidney disease, unspecified; E87.5-Hyperkalemia;  D64.9-Anemia, unspecified; M54.16-Radiculopathy, lumbar region;  I73.9-Peripheral vascular disease, unspecified     COMPARISON: 9/15/2020     DLP: 479.06 mGy.cm. Automated exposure control was utilized to decrease  patient radiation dose.     FINDINGS:     Nonaneurysmal abdominal aorta with heavy atherosclerotic calcification.  Celiac artery SMA, and MIKI are widely patent. Mild atherosclerotic  narrowing of the left main renal artery. There are 2 renal arteries  bilaterally.     The right common iliac and internal iliac arteries are widely patent.  Focal moderate to severe atherosclerotic stenosis of the right external  iliac artery on series 4 image 155. The  right common femoral artery is  widely patent and contains heavy atherosclerotic calcification. Right  profunda is widely patent. Heavy atherosclerotic calcification  throughout the right SFA with multifocal areas of moderate stenosis.  Patent atherosclerotic popliteal artery. Three-vessel right lower  extremity runoff.     The left common iliac and internal iliac artery are widely patent.  Moderate atherosclerotic stenosis of the left external iliac artery on  series 4 image 159. Left common femoral artery is widely patent. Left  profunda is widely patent. Heavy atherosclerotic calcification  throughout the SFA with multifocal areas of proximal stenosis. The left  SFA appears occluded along its mid course (series 4 image 297) with  reconstitution occurring at the distal vessel. After reconstitution,  there is an area of severe left SFA stenosis on series 4 image 359.  Patent left popliteal artery. Three-vessel left lower extremity runoff  appears maintained.     Nonvascular findings:     Moderate right pleural effusion with overlying atelectasis. Trace left  pleural effusion. Cardiomegaly. Liver appears cirrhotic. 9 mm arterial  enhancing lesion in the left liver on image 54. No gallstone or biliary  ductal dilatation. The pancreas, spleen, and adrenal glands are  unremarkable. Multifocal bilateral renal cortical scarring. No  hydronephrosis or large calculi. No focal urinary bladder wall  thickening.      Moderate volume stool throughout the colon. No colonic wall thickening  or pericolonic fat stranding. No small bowel distention or inflammation.     No evidence of ascites. There is diffuse edematous change throughout the  mesentery/peritoneum. No pelvic mass or collection. Prostate is enlarged  measuring 5.3 cm transverse dimension. No pathologically enlarged  abdominal or pelvic lymph nodes.     Diffuse abdominal wall soft tissue edema. Right hip arthroplasty. Left  femoral head avascular necrosis with areas of  subchondral collapse  noted.       Impression:         1.  Nonaneurysmal abdominal aorta with heavy atherosclerotic  calcification throughout.     2.  Focal moderate-severe atherosclerotic stenosis of the right external  iliac artery. Heavy atherosclerotic calcification throughout the right  SFA with multifocal areas of moderate stenosis. Three-vessel right lower  extremity runoff.     3.  Moderate atherosclerotic stenosis of the left external iliac artery.  Heavy atherosclerotic calcification throughout the left SFA with  multifocal moderate stenosis proximally and complete occlusion of the  mid vessel with reconstitution distally. After reconstitution, there is  an area of focal severe left SFA stenosis. Three-vessel left lower  extremity runoff appears maintained.     4.  Moderate right pleural effusion. Cardiomegaly.     5.  Liver appears cirrhotic. 9 mm enhancing lesion in the left liver on  image 54 series 4. Recommend follow-up liver protocol CT or MRI for  further characterization.     6.  Anasarca with diffuse abdominal wall soft tissue edema and extensive  diffuse edematous change throughout the mesentery and peritoneum.     7.  Advanced left femoral head avascular necrosis.           This report was signed and finalized on 4/21/2025 4:33 PM by Dr. Wellington Farley MD.       US Arterial Doppler Lower Extremity Complete [[id number]] Collected: 04/21/25 1419     Updated: 04/21/25 1425    Narrative:      History: PAD     Comments: Grayscale imaging as well as color flow duplex were used to  evaluate bilateral lower extremity arterial systems.     On the right, the peak systolic velocity in the common femoral artery  measured 31.7 cm/s. In the profunda femoris artery measured 50.3 cm/s.  The proximal SFA appears occluded. The mid SFA measured 20.1 cm/s. In  the distal SFA measured 48.6 cm/s. In the popliteal artery measured 25.3  cm/s. In the posterior tibial artery measured 20.7 cm/s. In the anterior  tibial  artery measured 35.1 cm/s.     On the left, the peak systolic velocity in the common femoral artery  measured 103.2 cm/s. In the profunda femoris artery measured 123.3 cm/s.  In the proximal SFA measured 26.5 cm/s. The mid SFA appears occluded. In  the distal SFA measured 27.3 cm/s. In the popliteal artery measured 16.3  cm/s. In the posterior tibial artery measured 55.8 cm/s. In the anterior  tibial artery measured 42.5 cm/s.       Impression:      In the right lower extremity, there is heavy plaque burden  in the common femoral artery with an occluded proximal SFA. There is  reconstitution in the mid SFA but dampened flow throughout the lower  extremity with heavy plaque burden throughout. In the left lower  extremity, there is also heavy plaque burden in the common femoral  artery with an occluded mid SFA. There is reconstitution in the distal  SFA with dampened flow throughout the lower extremity with heavy plaque  burden throughout. Further imaging/evaluation may be warranted.     This report was signed and finalized on 4/21/2025 2:22 PM by Dr. Gil Pineda MD.        Renal Bilateral [[id number]] Collected: 04/20/25 1717     Updated: 04/20/25 1722    Narrative:      RENAL ULTRASOUND COMPLETE 4/20/2025 3:47 PM     REASON FOR EXAM: Renal Failure; N18.9-Chronic kidney disease,  unspecified; E87.5-Hyperkalemia; D64.9-Anemia, unspecified;  M54.16-Radiculopathy, lumbar region; I73.9-Peripheral vascular disease,  unspecified.     COMPARISON: None.       TECHNIQUE: Multiple longitudinal and transverse realtime sonographic  images of the kidneys and urinary bladder are obtained.     FINDINGS:     RIGHT KIDNEY: The right kidney measures 9.7 cm in length. The cortical  thickness is normal. There is increased cortical echogenicity. There is  a mid to lower pole cyst measuring 1.7 x 1.4 x 1.6 cm. There is no  hydronephrosis. No nephrolithiasis or abnormal perinephric fluid  collections.     LEFT KIDNEY: The left  kidney measures 10.1 cm in length. The cortical  thickness is normal. There is increased cortical echogenicity. There is  a 1 x 0.7 x 0.9 cm cyst in the lower pole. There is no hydronephrosis.  No nephrolithiasis or abnormal perinephric fluid collections.     PELVIS: There is mild thickening of the bladder wall. There is no free  fluid in the pelvis.     ADDITIONAL FINDINGS: The abdominal aorta is normal caliber. There is  atheromatous calcification.       Impression:      1. Increased cortical echogenicity of both kidneys consistent with  medical renal disease. No hydronephrosis.  2. Bilateral renal cysts.  3. Mild wall thickening of the urinary bladder which may be related to  muscular hypertrophy. Acute and chronic inflammation and infection are  also included in the differential. Neoplasm less likely.  4. Atheromatous abdominal aorta.           The images and report are stored on PAC's per institutional and state  guidelines.           This report was signed and finalized on 4/20/2025 5:19 PM by Dr. Mikael Gallegos MD.       XR Chest 1 View [[id number]] Collected: 04/20/25 1155     Updated: 04/20/25 1159    Narrative:      XR CHEST 1 VW- 4/20/2025 10:40 AM     HISTORY: Preop; N18.9-Chronic kidney disease, unspecified;  E87.5-Hyperkalemia; D64.9-Anemia, unspecified; M54.16-Radiculopathy,  lumbar region; I73.9-Peripheral vascular disease, unspecified       COMPARISON: Chest x-ray dated 4/13/2025     FINDINGS:  Upright frontal radiograph of the chest was obtained     Bilateral interstitial coarsening with subpleural lines reflecting  interstitial edema. Trace left-sided and small to moderate sided right  pleural effusions. No consolidation or pneumothorax. Prior coronary  bypass. Right IJ dual-lumen central venous catheter. No acute bony  abnormality.       Impression:      1.  Volume overload with interstitial edema and bilateral pleural  effusions as detailed above.     This report was signed and finalized on  4/20/2025 11:56 AM by Dr Tyrell Knox.       CT Lumbar Spine Without Contrast [[id number]] Collected: 04/20/25 0947     Updated: 04/20/25 0953    Narrative:      CT LUMBAR SPINE WO CONTRAST- 4/20/2025 8:24 AM     HISTORY: Right-sided lumbar radiculopathy     COMPARISON: None      DOSE LENGTH PRODUCT: 439.68 mGy.cm. Automated exposure control was also  utilized to decrease patient radiation dose.     TECHNIQUE: Serial helical tomographic images of the lumbar spine were  obtained without the use of intravenous contrast. Additionally, sagittal  and coronal reformatted images were provided for review. Automated  exposure control is utilized to reduce patient radiation dose.     FINDINGS:     Counting will assume 5 lumbar-type vertebrae. The spine is imaged from  T12 to S3.     There is no visualized acute fracture or traumatic subluxation. Lumbar  lordosis is maintained. Vertebral body heights are well maintained.  Multilevel loss of intervertebral disc height, L5-S1 in particular. No  high-grade bony narrowing of the spinal canal. Advanced facet  arthropathy. Posterior elements are intact. Neuroforaminal narrowing at  multiple levels, especially L5-S1 on the right. There is a layering  right-sided pleural effusion. Atheromatous vascular calcification along  the abdominal aorta. Incidentally noted sacral canal Tarlov cysts.  Included portion of the bony pelvis is intact.       Impression:      1. No acute osseous injury or malalignment.   2. Underlying advanced lumbar spine osteoarthritis changes including a  high-grade right-sided neuroforaminal narrowing at L5-S1.  3. Layering right-sided pleural effusion.           This report was signed and finalized on 4/20/2025 9:50 AM by Dr Tyrell Knox.                I have reviewed the patient's current medications.     ALPRAZolam, 0.25 mg, Oral, Nightly  [START ON 4/25/2025] aspirin, 81 mg, Oral, Daily  [START ON 4/25/2025] atorvastatin, 10 mg, Oral, Daily  [START ON  4/25/2025] calcitriol, 0.5 mcg, Oral, Daily  carvedilol, 25 mg, Oral, Q12H  [START ON 4/25/2025] cetirizine, 10 mg, Oral, Daily  cloNIDine, 0.3 mg, Oral, TID  cloNIDine, 1 patch, Transdermal, Weekly  [START ON 4/25/2025] clopidogrel, 75 mg, Oral, Daily  furosemide, 40 mg, Oral, BID  hydrALAZINE, 100 mg, Oral, TID  isosorbide dinitrate, 10 mg, Oral, TID - Nitrates  [START ON 4/25/2025] levothyroxine, 100 mcg, Oral, Q AM  melatonin, 10 mg, Oral, Nightly  [START ON 4/25/2025] mesalamine, 1.5 g, Oral, Daily  montelukast, 10 mg, Oral, Nightly  NIFEdipine XL, 120 mg, Oral, Daily  [START ON 4/25/2025] pantoprazole, 40 mg, Oral, Q AM  sodium bicarbonate, 650 mg, Oral, 5x Daily  tamsulosin, 0.4 mg, Oral, Nightly       Assessment/Plan   Assessment  Active Hospital Problems    Diagnosis     **Anemia, multifactorial to include chronic kidney disease, iron deficiency and possible bleed     Acute pain of lower extremity, right     Hyperkalemia     CKD (chronic kidney disease) stage 5     Resistant hypertension     PAD (peripheral artery disease)    Oozing at right permacath site    Treatment Plan  1.  Chronic kidney disease stage V/end-stage renal disease, nephrology following, patient had dialysis today and 2000 mL was removed, plans for HD again tomorrow, outpatient HD chair time arranged for Monday Wednesday Friday at [facility] however family notified today that patient would need to proceed with Tuesday/Thursday/Saturday treatments due to problems with chair availability.     2.  Anemia multifactorial; check anemia studies, transfused 1 unit packed red blood cells on 4/19, Hgb stable, labs in a.m.     3.  Hyperkalemia; Lokelma 10 g 3 times daily today per nephrology, dialysis today, resolved, nephrology treating, labs in the a.m.     4.  Resistant hypertension: Home medications reviewed and restarted, monitor per protocol     5.  Acute pain right lower extremity;   Vascular surgery is planning to workup  further with angiogram, plan was to do angiogram today but unfortunately patient had dialysis yesterday and it will be rescheduled for tomorrow.     DVT prophylaxis with SCDs    Disposition-for angiogram tomorrow       Electronically signed by Subhash Duran MD-BC, 04/24/25, 16:30 CDT.

## 2025-04-24 NOTE — ANESTHESIA PREPROCEDURE EVALUATION
Anesthesia Evaluation     Patient summary reviewed   no history of anesthetic complications:   NPO Solid Status: > 8 hours             Airway   Mallampati: II  Small opening  Dental    (+) edentulous    Pulmonary    (+) a smoker Former, COPD,sleep apnea  (-) asthma  Cardiovascular   Exercise tolerance: good (4-7 METS)    (+) hypertension, valvular problems/murmurs murmur, CAD, CABG (2004), CHF , PVD, hyperlipidemia,  carotid artery disease right carotid  (-) past MI, angina    ROS comment: Echo:  ·  Left ventricular systolic function is normal. Left ventricular ejection fraction appears to be 56 - 60%.  ·  Left ventricular wall thickness is consistent with mild septal asymmetric hypertrophy.  ·  Left ventricular diastolic function is consistent with (grade II w/high LAP) pseudonormalization.  ·  Normal right ventricular cavity size and systolic function noted.  ·  The left atrial cavity is mild to moderately dilated.  ·  The right atrial cavity is dilated.  ·  Mild aortic valve stenosis is present.  ·  Mild to moderate mitral valve regurgitation is present.  ·  Moderate to severe tricuspid valve regurgitation is present.  ·  Estimated right ventricular systolic pressure from tricuspid regurgitation is markedly elevated (>55 mmHg).      Neuro/Psych- negative ROS  (-) seizures, TIA, CVA  GI/Hepatic/Renal/Endo    (+) GERD, renal disease- CRI and ESRD, thyroid problem hypothyroidism  (-) liver disease, diabetes    Musculoskeletal     Abdominal    Substance History      OB/GYN          Other   arthritis, blood dyscrasia (CLL),   history of cancer                  Anesthesia Plan    ASA 4     MAC     (Dialysis yesterday, labs reviewed)  intravenous induction     Anesthetic plan, risks, benefits, and alternatives have been provided, discussed and informed consent has been obtained with: patient.    CODE STATUS:    Code Status (Patient has no pulse and is not breathing): No CPR (Do Not Attempt to Resuscitate)  Medical  Interventions (Patient has pulse or is breathing): Limited Support  Medical Intervention Limits: No cardioversion   No intubation (DNI)  Level Of Support Discussed With: Patient

## 2025-04-24 NOTE — PLAN OF CARE
Goal Outcome Evaluation:              Outcome Evaluation: Pt A/Ox4, hypertensive in dialysis. 1.5L off today. R chest permacath, continues to leak, dialysis RN had to change x2 due to saturation- she added quick clot; vascular surgery made aware. doppler tibial pulses in dependent position. Plans for angiogram tomorrow, NPO at midnight, consent signed. C/o RLE pain, see MAR.  Safety maintained.

## 2025-04-24 NOTE — PLAN OF CARE
Goal Outcome Evaluation:  Plan of Care Reviewed With: patient        Progress: no change  Outcome Evaluation: PT. NPO - AWAITING ANGIOGRAM PROCEDURE. BILATERAL LOWER EXTREMITIES COOL TO TOUCH (RIGHT COOLER THAN LEFT) . ROOM AIR. NO REQUEST FOR PRN PAIN MEDS. NO  DISTRESS NOTED.

## 2025-04-25 ENCOUNTER — APPOINTMENT (OUTPATIENT)
Dept: INTERVENTIONAL RADIOLOGY/VASCULAR | Facility: HOSPITAL | Age: 75
DRG: 278 | End: 2025-04-25
Payer: MEDICARE

## 2025-04-25 ENCOUNTER — ANESTHESIA (OUTPATIENT)
Dept: PERIOP | Facility: HOSPITAL | Age: 75
End: 2025-04-25
Payer: MEDICARE

## 2025-04-25 ENCOUNTER — ANESTHESIA EVENT (OUTPATIENT)
Dept: PERIOP | Facility: HOSPITAL | Age: 75
End: 2025-04-25
Payer: MEDICARE

## 2025-04-25 LAB
ANION GAP SERPL CALCULATED.3IONS-SCNC: 10 MMOL/L (ref 5–15)
BUN SERPL-MCNC: 20 MG/DL (ref 8–23)
BUN/CREAT SERPL: 12.6 (ref 7–25)
CALCIUM SPEC-SCNC: 8.4 MG/DL (ref 8.6–10.5)
CHLORIDE SERPL-SCNC: 100 MMOL/L (ref 98–107)
CO2 SERPL-SCNC: 28 MMOL/L (ref 22–29)
CREAT SERPL-MCNC: 1.59 MG/DL (ref 0.76–1.27)
EGFRCR SERPLBLD CKD-EPI 2021: 44.4 ML/MIN/1.73
GLUCOSE SERPL-MCNC: 84 MG/DL (ref 65–99)
POTASSIUM SERPL-SCNC: 3.7 MMOL/L (ref 3.5–5.2)
SODIUM SERPL-SCNC: 138 MMOL/L (ref 136–145)

## 2025-04-25 PROCEDURE — 76000 FLUOROSCOPY <1 HR PHYS/QHP: CPT

## 2025-04-25 PROCEDURE — C1894 INTRO/SHEATH, NON-LASER: HCPCS | Performed by: SURGERY

## 2025-04-25 PROCEDURE — C1725 CATH, TRANSLUMIN NON-LASER: HCPCS | Performed by: SURGERY

## 2025-04-25 PROCEDURE — 75625 CONTRAST EXAM ABDOMINL AORTA: CPT

## 2025-04-25 PROCEDURE — 25010000002 HYDRALAZINE PER 20 MG: Performed by: INTERNAL MEDICINE

## 2025-04-25 PROCEDURE — 25810000003 SODIUM CHLORIDE 0.9 % SOLUTION: Performed by: ANESTHESIOLOGY

## 2025-04-25 PROCEDURE — C2623 CATH, TRANSLUMIN, DRUG-COAT: HCPCS | Performed by: SURGERY

## 2025-04-25 PROCEDURE — 25010000002 FENTANYL CITRATE (PF) 100 MCG/2ML SOLUTION: Performed by: NURSE ANESTHETIST, CERTIFIED REGISTERED

## 2025-04-25 PROCEDURE — 047H3ZZ DILATION OF RIGHT EXTERNAL ILIAC ARTERY, PERCUTANEOUS APPROACH: ICD-10-PCS | Performed by: SURGERY

## 2025-04-25 PROCEDURE — 25010000002 PROPOFOL 1000 MG/100ML EMULSION: Performed by: NURSE ANESTHETIST, CERTIFIED REGISTERED

## 2025-04-25 PROCEDURE — 25010000002 PROTAMINE SULFATE PER 10 MG: Performed by: NURSE ANESTHETIST, CERTIFIED REGISTERED

## 2025-04-25 PROCEDURE — C1769 GUIDE WIRE: HCPCS | Performed by: SURGERY

## 2025-04-25 PROCEDURE — 04FK3ZZ FRAGMENTATION OF RIGHT FEMORAL ARTERY, PERCUTANEOUS APPROACH: ICD-10-PCS | Performed by: SURGERY

## 2025-04-25 PROCEDURE — C1887 CATHETER, GUIDING: HCPCS | Performed by: SURGERY

## 2025-04-25 PROCEDURE — B410YZZ FLUOROSCOPY OF ABDOMINAL AORTA USING OTHER CONTRAST: ICD-10-PCS | Performed by: SURGERY

## 2025-04-25 PROCEDURE — 75710 ARTERY X-RAYS ARM/LEG: CPT

## 2025-04-25 PROCEDURE — B41CYZZ FLUOROSCOPY OF PELVIC ARTERIES USING OTHER CONTRAST: ICD-10-PCS | Performed by: SURGERY

## 2025-04-25 PROCEDURE — 047K3Z1 DILATION OF RIGHT FEMORAL ARTERY USING DRUG-COATED BALLOON, PERCUTANEOUS APPROACH: ICD-10-PCS | Performed by: SURGERY

## 2025-04-25 PROCEDURE — 25010000002 HYDROMORPHONE PER 4 MG: Performed by: ANESTHESIOLOGY

## 2025-04-25 PROCEDURE — C1760 CLOSURE DEV, VASC: HCPCS | Performed by: SURGERY

## 2025-04-25 PROCEDURE — 63710000001 PROMETHAZINE PER 25 MG: Performed by: SURGERY

## 2025-04-25 PROCEDURE — 25010000002 FENTANYL CITRATE (PF) 50 MCG/ML SOLUTION: Performed by: ANESTHESIOLOGY

## 2025-04-25 PROCEDURE — 047J3ZZ DILATION OF LEFT EXTERNAL ILIAC ARTERY, PERCUTANEOUS APPROACH: ICD-10-PCS | Performed by: SURGERY

## 2025-04-25 PROCEDURE — 25010000002 HEPARIN (PORCINE) PER 1000 UNITS: Performed by: NURSE PRACTITIONER

## 2025-04-25 PROCEDURE — 80048 BASIC METABOLIC PNL TOTAL CA: CPT | Performed by: ANESTHESIOLOGY

## 2025-04-25 PROCEDURE — 25010000002 CEFAZOLIN PER 500 MG: Performed by: SURGERY

## 2025-04-25 PROCEDURE — 75710 ARTERY X-RAYS ARM/LEG: CPT | Performed by: SURGERY

## 2025-04-25 PROCEDURE — 25010000002 HEPARIN (PORCINE) PER 1000 UNITS: Performed by: SURGERY

## 2025-04-25 PROCEDURE — 25010000002 HEPARIN (PORCINE) PER 1000 UNITS: Performed by: NURSE ANESTHETIST, CERTIFIED REGISTERED

## 2025-04-25 PROCEDURE — 25510000001 IOPAMIDOL 61 % SOLUTION: Performed by: SURGERY

## 2025-04-25 PROCEDURE — B41FYZZ FLUOROSCOPY OF RIGHT LOWER EXTREMITY ARTERIES USING OTHER CONTRAST: ICD-10-PCS | Performed by: SURGERY

## 2025-04-25 PROCEDURE — 37224 PR REVSC OPN/PRG FEM/POP W/ANGIOPLASTY UNI: CPT | Performed by: SURGERY

## 2025-04-25 PROCEDURE — 75625 CONTRAST EXAM ABDOMINL AORTA: CPT | Performed by: SURGERY

## 2025-04-25 PROCEDURE — 25010000002 BUPIVACAINE (PF) 0.5 % SOLUTION: Performed by: SURGERY

## 2025-04-25 PROCEDURE — 25010000002 CEFAZOLIN PER 500 MG: Performed by: NURSE PRACTITIONER

## 2025-04-25 RX ORDER — SODIUM CHLORIDE 0.9 % (FLUSH) 0.9 %
3 SYRINGE (ML) INJECTION EVERY 12 HOURS SCHEDULED
Status: DISCONTINUED | OUTPATIENT
Start: 2025-04-25 | End: 2025-04-25 | Stop reason: HOSPADM

## 2025-04-25 RX ORDER — IOPAMIDOL 612 MG/ML
INJECTION, SOLUTION INTRAVASCULAR AS NEEDED
Status: DISCONTINUED | OUTPATIENT
Start: 2025-04-25 | End: 2025-04-25 | Stop reason: HOSPADM

## 2025-04-25 RX ORDER — OXYCODONE AND ACETAMINOPHEN 5; 325 MG/1; MG/1
1 TABLET ORAL ONCE AS NEEDED
Status: COMPLETED | OUTPATIENT
Start: 2025-04-25 | End: 2025-04-25

## 2025-04-25 RX ORDER — HYDRALAZINE HYDROCHLORIDE 20 MG/ML
10 INJECTION INTRAMUSCULAR; INTRAVENOUS EVERY 4 HOURS PRN
Status: DISCONTINUED | OUTPATIENT
Start: 2025-04-25 | End: 2025-04-26 | Stop reason: HOSPADM

## 2025-04-25 RX ORDER — PROTAMINE SULFATE 10 MG/ML
INJECTION, SOLUTION INTRAVENOUS AS NEEDED
Status: DISCONTINUED | OUTPATIENT
Start: 2025-04-25 | End: 2025-04-25 | Stop reason: SURG

## 2025-04-25 RX ORDER — FENTANYL CITRATE 50 UG/ML
25 INJECTION, SOLUTION INTRAMUSCULAR; INTRAVENOUS
Status: DISCONTINUED | OUTPATIENT
Start: 2025-04-25 | End: 2025-04-25 | Stop reason: HOSPADM

## 2025-04-25 RX ORDER — SODIUM CHLORIDE 9 MG/ML
50 INJECTION, SOLUTION INTRAVENOUS CONTINUOUS
Status: DISCONTINUED | OUTPATIENT
Start: 2025-04-25 | End: 2025-04-25

## 2025-04-25 RX ORDER — LABETALOL HYDROCHLORIDE 5 MG/ML
5 INJECTION, SOLUTION INTRAVENOUS
Status: DISCONTINUED | OUTPATIENT
Start: 2025-04-25 | End: 2025-04-25 | Stop reason: HOSPADM

## 2025-04-25 RX ORDER — NICARDIPINE HYDROCHLORIDE 2.5 MG/ML
INJECTION INTRAVENOUS AS NEEDED
Status: DISCONTINUED | OUTPATIENT
Start: 2025-04-25 | End: 2025-04-25 | Stop reason: SURG

## 2025-04-25 RX ORDER — SODIUM CHLORIDE 0.9 % (FLUSH) 0.9 %
3-10 SYRINGE (ML) INJECTION AS NEEDED
Status: DISCONTINUED | OUTPATIENT
Start: 2025-04-25 | End: 2025-04-25 | Stop reason: HOSPADM

## 2025-04-25 RX ORDER — BUPIVACAINE HYDROCHLORIDE 5 MG/ML
INJECTION, SOLUTION EPIDURAL; INTRACAUDAL; PERINEURAL AS NEEDED
Status: DISCONTINUED | OUTPATIENT
Start: 2025-04-25 | End: 2025-04-25 | Stop reason: HOSPADM

## 2025-04-25 RX ORDER — OXYCODONE AND ACETAMINOPHEN 7.5; 325 MG/1; MG/1
1 TABLET ORAL EVERY 4 HOURS PRN
Status: DISCONTINUED | OUTPATIENT
Start: 2025-04-25 | End: 2025-04-25 | Stop reason: HOSPADM

## 2025-04-25 RX ORDER — TRAMADOL HYDROCHLORIDE 50 MG/1
50 TABLET ORAL EVERY 6 HOURS PRN
Status: DISCONTINUED | OUTPATIENT
Start: 2025-04-25 | End: 2025-04-26 | Stop reason: HOSPADM

## 2025-04-25 RX ORDER — FENTANYL CITRATE 50 UG/ML
INJECTION, SOLUTION INTRAMUSCULAR; INTRAVENOUS AS NEEDED
Status: DISCONTINUED | OUTPATIENT
Start: 2025-04-25 | End: 2025-04-25 | Stop reason: SURG

## 2025-04-25 RX ORDER — FLUMAZENIL 0.1 MG/ML
0.2 INJECTION INTRAVENOUS AS NEEDED
Status: DISCONTINUED | OUTPATIENT
Start: 2025-04-25 | End: 2025-04-25 | Stop reason: HOSPADM

## 2025-04-25 RX ORDER — HYDROMORPHONE HYDROCHLORIDE 1 MG/ML
0.2 INJECTION, SOLUTION INTRAMUSCULAR; INTRAVENOUS; SUBCUTANEOUS
Status: DISCONTINUED | OUTPATIENT
Start: 2025-04-25 | End: 2025-04-25 | Stop reason: HOSPADM

## 2025-04-25 RX ORDER — NALOXONE HCL 0.4 MG/ML
0.04 VIAL (ML) INJECTION AS NEEDED
Status: DISCONTINUED | OUTPATIENT
Start: 2025-04-25 | End: 2025-04-25 | Stop reason: HOSPADM

## 2025-04-25 RX ORDER — SODIUM CHLORIDE 9 MG/ML
40 INJECTION, SOLUTION INTRAVENOUS AS NEEDED
Status: DISCONTINUED | OUTPATIENT
Start: 2025-04-25 | End: 2025-04-25 | Stop reason: HOSPADM

## 2025-04-25 RX ORDER — HYDROMORPHONE HYDROCHLORIDE 1 MG/ML
0.25 INJECTION, SOLUTION INTRAMUSCULAR; INTRAVENOUS; SUBCUTANEOUS
Status: DISCONTINUED | OUTPATIENT
Start: 2025-04-25 | End: 2025-04-25 | Stop reason: HOSPADM

## 2025-04-25 RX ORDER — PROPOFOL 10 MG/ML
INJECTION, EMULSION INTRAVENOUS CONTINUOUS PRN
Status: DISCONTINUED | OUTPATIENT
Start: 2025-04-25 | End: 2025-04-25 | Stop reason: SURG

## 2025-04-25 RX ORDER — HEPARIN SODIUM 1000 [USP'U]/ML
INJECTION, SOLUTION INTRAVENOUS; SUBCUTANEOUS AS NEEDED
Status: DISCONTINUED | OUTPATIENT
Start: 2025-04-25 | End: 2025-04-25 | Stop reason: SURG

## 2025-04-25 RX ADMIN — ALPRAZOLAM 0.25 MG: 0.25 TABLET ORAL at 20:30

## 2025-04-25 RX ADMIN — HEPARIN SODIUM 1000 UNITS: 1000 INJECTION, SOLUTION INTRAVENOUS; SUBCUTANEOUS at 12:30

## 2025-04-25 RX ADMIN — TRAMADOL HYDROCHLORIDE 50 MG: 50 TABLET, COATED ORAL at 21:00

## 2025-04-25 RX ADMIN — NICARDIPINE HYDROCHLORIDE 1000 MCG: 2.5 INJECTION INTRAVENOUS at 13:53

## 2025-04-25 RX ADMIN — FENTANYL CITRATE 25 MCG: 50 INJECTION, SOLUTION INTRAMUSCULAR; INTRAVENOUS at 12:17

## 2025-04-25 RX ADMIN — HYDRALAZINE HYDROCHLORIDE 100 MG: 50 TABLET ORAL at 20:35

## 2025-04-25 RX ADMIN — CEFAZOLIN 2000 MG: 2 INJECTION, POWDER, FOR SOLUTION INTRAMUSCULAR; INTRAVENOUS at 20:33

## 2025-04-25 RX ADMIN — FUROSEMIDE 40 MG: 40 TABLET ORAL at 20:30

## 2025-04-25 RX ADMIN — CLONIDINE HYDROCHLORIDE 0.3 MG: 0.1 TABLET ORAL at 20:29

## 2025-04-25 RX ADMIN — CARVEDILOL 25 MG: 25 TABLET, FILM COATED ORAL at 20:30

## 2025-04-25 RX ADMIN — FENTANYL CITRATE 25 MCG: 50 INJECTION, SOLUTION INTRAMUSCULAR; INTRAVENOUS at 16:05

## 2025-04-25 RX ADMIN — TAMSULOSIN HYDROCHLORIDE 0.4 MG: 0.4 CAPSULE ORAL at 20:30

## 2025-04-25 RX ADMIN — PROPOFOL 75 MCG/KG/MIN: 10 INJECTION, EMULSION INTRAVENOUS at 12:17

## 2025-04-25 RX ADMIN — HYDRALAZINE HYDROCHLORIDE 10 MG: 20 INJECTION INTRAMUSCULAR; INTRAVENOUS at 18:46

## 2025-04-25 RX ADMIN — HEPARIN SODIUM 6000 UNITS: 1000 INJECTION, SOLUTION INTRAVENOUS; SUBCUTANEOUS at 12:45

## 2025-04-25 RX ADMIN — ACETAMINOPHEN 1000 MG: 500 TABLET, FILM COATED ORAL at 23:24

## 2025-04-25 RX ADMIN — PROTAMINE SULFATE 20 MG: 10 INJECTION, SOLUTION INTRAVENOUS at 13:37

## 2025-04-25 RX ADMIN — SODIUM BICARBONATE 650 MG: 650 TABLET ORAL at 20:32

## 2025-04-25 RX ADMIN — Medication 10 MG: at 20:30

## 2025-04-25 RX ADMIN — HYDROMORPHONE HYDROCHLORIDE 0.25 MG: 1 INJECTION, SOLUTION INTRAMUSCULAR; INTRAVENOUS; SUBCUTANEOUS at 15:40

## 2025-04-25 RX ADMIN — HYDROMORPHONE HYDROCHLORIDE 0.25 MG: 1 INJECTION, SOLUTION INTRAMUSCULAR; INTRAVENOUS; SUBCUTANEOUS at 15:58

## 2025-04-25 RX ADMIN — NICARDIPINE HYDROCHLORIDE 1000 MCG: 2.5 INJECTION INTRAVENOUS at 13:58

## 2025-04-25 RX ADMIN — OXYCODONE HYDROCHLORIDE AND ACETAMINOPHEN 1 TABLET: 5; 325 TABLET ORAL at 15:09

## 2025-04-25 RX ADMIN — MONTELUKAST SODIUM 10 MG: 10 TABLET, COATED ORAL at 20:30

## 2025-04-25 RX ADMIN — PROMETHAZINE HYDROCHLORIDE 12.5 MG: 25 TABLET ORAL at 17:47

## 2025-04-25 RX ADMIN — CEFAZOLIN 2000 MG: 2 INJECTION, POWDER, FOR SOLUTION INTRAMUSCULAR; INTRAVENOUS at 12:21

## 2025-04-25 RX ADMIN — FENTANYL CITRATE 75 MCG: 50 INJECTION, SOLUTION INTRAMUSCULAR; INTRAVENOUS at 12:57

## 2025-04-25 RX ADMIN — SODIUM CHLORIDE 50 ML/HR: 9 INJECTION, SOLUTION INTRAVENOUS at 11:45

## 2025-04-25 RX ADMIN — HEPARIN SODIUM 3600 UNITS: 1000 INJECTION INTRAVENOUS; SUBCUTANEOUS at 11:00

## 2025-04-25 NOTE — PLAN OF CARE
Goal Outcome Evaluation:  Plan of Care Reviewed With: patient, child           Outcome Evaluation: Recieved pt from PACU back to room 384 s/p RLE angiogram. mynx closure with safe guard pressure dressing to L groin, 10cc air removed at 1710 and 1810, groin soft, small amt blood drainage on dressing. dialysis tx prior to procedure got 1.5L off. Per family Dr. Pineda restiched R chest permacath in OR due to continued leaking from site, dressing with moderate amt blood.   tibial pulses dopplered, per PACU RN pedal pulses are intermitten and Dr. Pineda is aware. N/V post-op, unable to tolerate PO BP meds, ordered for PRN hydralazine added. flat until 1830.

## 2025-04-25 NOTE — PROGRESS NOTES
Nephrology ([facility]) Progress Note      Patient:  Ricardo Hugo  YOB: 1950  Date of Service: 4/25/2025  MRN: [id number]   Acct: [id number]   Primary Care Physician: Nathan Zimmer MD  Advance Directive:   Code Status and Medical Interventions: No CPR (Do Not Attempt to Resuscitate); Limited Support; No cardioversion, No intubation (DNI)   Ordered at: 04/20/25 1702     Code Status (Patient has no pulse and is not breathing):    No CPR (Do Not Attempt to Resuscitate)     Medical Interventions (Patient has pulse or is breathing):    Limited Support     Medical Intervention Limits:    No cardioversion       No intubation (DNI)     Level Of Support Discussed With:    Patient     Admit Date: 4/20/2025       Hospital Day: 5  Referring Provider: No Known Provider      Patient personally seen and examined.  Complete chart including Consults, Notes, Operative Reports, Labs, Cardiology, and Radiology studies reviewed as able.        Subjective:  Ricardo Hugo is a 75 y.o. male for whom we were consulted for evaluation and treatment of chronic kidney disease stage 5 with progression to ESRD. Patient has recently been preparing to start hemodialysis. Had permcath placed on 4/16 and was due to start dialysis this week. Presented to ER on 4/20 with severe right lower extremity pain. Labs in ER showed creatinine 4.5 which is similar to his recent baseline. Was found to be significantly anemic with hemoglobin of 6.6. potassium elevated at 6.6. Admitted to medical floor. Received PRBC transfusion. Completed first HD on 4/21 and tolerated well. Second HD provided on 4/22. Third HD on 4/23 was also tolerated well    Today is awake and alert. No new complaints.  Seen during HD and tolerating well. Angiogram of lower extremity was rescheduled to today.   Dialysis   Patient was seen and evaluated on renal replacement therapy and I have personally evaluated the patient and directed the  therapy  Modality: Hemodialysis  Access: Catheter  Location: right upper  QB: 400  QD: 700  UF: 1500      Allergies:  Ondansetron, Zofran [ondansetron hcl], Lortab [hydrocodone-acetaminophen], and Allopurinol    Home Meds:  Facility-Administered Medications Prior to Admission   Medication Dose Route Frequency Provider Last Rate Last Admin    cyanocobalamin injection 1,000 mcg  1,000 mcg Intramuscular Q28 Days Ruthie Parra C, APRN   1,000 mcg at 08/11/23 1123     Medications Prior to Admission   Medication Sig Dispense Refill Last Dose/Taking    albuterol sulfate  (90 Base) MCG/ACT inhaler Inhale 2 puffs Every 6 (Six) Hours As Needed for Wheezing or Shortness of Air.   Past Week    ALPRAZolam (XANAX) 0.25 MG tablet Take 1 tablet by mouth Every Night.   Past Week    aspirin (aspirin) 81 MG EC tablet Take 1 tablet by mouth Daily.   Past Week    atorvastatin (LIPITOR) 10 MG tablet Take 1 tablet by mouth Daily. 90 tablet 3 Past Week    calcitriol (ROCALTROL) 0.5 MCG capsule Take 1 capsule by mouth Daily. 90 capsule 2 Past Week    carvedilol (COREG) 25 MG tablet Take 1 tablet by mouth 2 (Two) Times a Day With Meals.   Past Week    cholestyramine light 4 g packet Take 1 packet by mouth Daily. 90 packet 1 Past Week    cloNIDine (CATAPRES) 0.3 MG tablet Take 1 tablet by mouth 3 (Three) Times a Day. 270 tablet 2 Past Week    cloNIDine (CATAPRES-TTS) 0.2 MG/24HR patch Place 1 patch on the skin as directed by provider 1 (One) Time Per Week. THURSDAYS   Past Week    clopidogrel (PLAVIX) 75 MG tablet Take 1 tablet by mouth Daily.   Past Week    Copper Gluconate (Copper Caps) 2 MG capsule Take 2 mg by mouth Daily.   Past Week    docusate sodium (COLACE) 100 MG capsule Take 1 capsule by mouth 3 (Three) Times a Day As Needed for Constipation.   Past Week    Epoetin Anselmo (PROCRIT IJ) Inject 1 dose as directed 1 (One) Time Per Week. Monday   Past Month    fexofenadine (ALLEGRA) 180 MG tablet Take 1 tablet by mouth Daily.    Past Week    fluticasone (FLONASE) 50 MCG/ACT nasal spray Administer 2 sprays into the nostril(s) as directed by provider Daily.   Past Week    furosemide (Lasix) 20 MG tablet Take 0.5 tablets by mouth Daily.   Past Week    furosemide (LASIX) 40 MG tablet Take 1 tablet by mouth Daily. 90 tablet 2 Past Week    hydrALAZINE (APRESOLINE) 100 MG tablet Take 1 tablet by mouth 3 (Three) Times a Day. 100mg daily   Past Week    isosorbide dinitrate (ISORDIL) 10 MG tablet Take 1 tablet by mouth 3 (Three) Times a Day. 270 tablet 2 Past Week    levothyroxine (Synthroid) 100 MCG tablet Take 1 tablet by mouth Every Morning. 90 tablet 2 Past Week    magnesium chloride ER 64 MG DR tablet Take 1 tablet by mouth Daily.   Past Week    melatonin 5 MG tablet tablet Take 2 tablets by mouth Every Night. (Patient taking differently: Take 3 tablets by mouth Every Night. Patient taking 3 tablets per night)   Past Week    mesalamine (APRISO) 0.375 g 24 hr capsule Take 4 capsules by mouth Daily. 360 capsule 1 Past Week    montelukast (SINGULAIR) 10 MG tablet Take 1 tablet by mouth Every Night.   Past Week    Multiple Vitamins-Minerals (PRESERVISION/LUTEIN) capsule Take 1 capsule by mouth 2 (two) times a day.   Past Week    NIFEdipine CC (ADALAT CC) 60 MG 24 hr tablet Take 2 tablets by mouth Daily for 270 days.   Past Week Bedtime    NON FORMULARY Take 25 mg by mouth At Night As Needed. Zzzquill   Past Week    omeprazole (priLOSEC) 20 MG capsule Take 1 capsule by mouth Daily.   Past Week    sodium bicarbonate 650 MG tablet Take 1 tablet by mouth 5 (Five) Times a Day.   Past Week    spironolactone (ALDACTONE) 25 MG tablet Take 1 tablet by mouth Daily.   Past Week    tamsulosin (FLOMAX) 0.4 MG capsule 24 hr capsule Take 1 capsule by mouth Every Night.   Past Week    traMADol (ULTRAM) 50 MG tablet Take 1 tablet by mouth Every 6 (Six) Hours As Needed for Moderate Pain. 20 tablet 0 Past Week    valsartan (DIOVAN) 320 MG tablet Take 1 tablet by mouth  Daily.   Past Week    vitamin D (ERGOCALCIFEROL) 1.25 MG (65536 UT) capsule capsule Take 1 capsule by mouth 1 (One) Time Per Week.   Past Week       Medicines:  Current Facility-Administered Medications   Medication Dose Route Frequency Provider Last Rate Last Admin    acetaminophen (TYLENOL) 160 MG/5ML oral solution 650 mg  650 mg Oral Q4H PRN Melanie Ayala, APRN        Or    acetaminophen (TYLENOL) suppository 650 mg  650 mg Rectal Q4H PRN Melanie Ayala, APRN        acetaminophen (TYLENOL) tablet 1,000 mg  1,000 mg Oral Q6H PRN JamieMelanie, APRN        ALPRAZolam (XANAX) tablet 0.25 mg  0.25 mg Oral Nightly JamieMelanie briggs, APRN   0.25 mg at 04/24/25 2004    aspirin EC tablet 81 mg  81 mg Oral Daily JamieMelanie briggs, APRN        atorvastatin (LIPITOR) tablet 10 mg  10 mg Oral Daily JamieMelanie, APRN        sennosides-docusate (PERICOLACE) 8.6-50 MG per tablet 2 tablet  2 tablet Oral BID PRN Melanie Ayala, APRN        And    polyethylene glycol (MIRALAX) packet 17 g  17 g Oral Daily PRN Melanie Ayala, APRN        And    bisacodyl (DULCOLAX) EC tablet 5 mg  5 mg Oral Daily PRN Jamie, Melanie L, APRN        And    bisacodyl (DULCOLAX) suppository 10 mg  10 mg Rectal Daily PRN JamieMelanie briggs L, APRN        calcitriol (ROCALTROL) capsule 0.5 mcg  0.5 mcg Oral Daily JamieMelanie L, APRN        carvedilol (COREG) tablet 25 mg  25 mg Oral Q12H Katherine Camarena APRN   25 mg at 04/24/25 2004    cetirizine (zyrTEC) tablet 10 mg  10 mg Oral Daily JamieMelanie briggs, APRSHERITA        cloNIDine (CATAPRES) tablet 0.3 mg  0.3 mg Oral TID Katherine Camarena APRN   0.3 mg at 04/24/25 2003    cloNIDine (CATAPRES-TTS) 0.2 MG/24HR patch 1 patch  1 patch Transdermal Weekly Camarena, Katherine D, APRN        clopidogrel (PLAVIX) tablet 75 mg  75 mg Oral Daily Melanie Ayala APRN        furosemide (LASIX) tablet 40 mg  40 mg Oral BID Melanie Ayala APRN        heparin (porcine) injection 3,600 Units  3,600 Units Intracatheter PRN Melanie Ayala, APRN         hydrALAZINE (APRESOLINE) tablet 100 mg  100 mg Oral TID Katherine Camarena, APRN   100 mg at 04/24/25 1148    isosorbide dinitrate (ISORDIL) tablet 10 mg  10 mg Oral TID - Nitrates Melanie Ayala, APRN        lactated ringers infusion  100 mL/hr Intravenous Continuous Meghan Smith MD        levothyroxine (SYNTHROID, LEVOTHROID) tablet 100 mcg  100 mcg Oral Q AM Melanie Ayala, APRN        melatonin tablet 10 mg  10 mg Oral Nightly Melanie Ayala, APRN   10 mg at 04/24/25 2004    mesalamine (APRISO) 24 hr capsule 1.5 g  1.5 g Oral Daily Melanie Ayala APRSHERITA        montelukast (SINGULAIR) tablet 10 mg  10 mg Oral Nightly Melanie Ayala, APRN   10 mg at 04/24/25 2004    NIFEdipine XL (PROCARDIA XL) 24 hr tablet 120 mg  120 mg Oral Daily Katherine Camarena, APRN   120 mg at 04/24/25 1148    pantoprazole (PROTONIX) EC tablet 40 mg  40 mg Oral Q AM Melanie Ayala, APRN        promethazine (PHENERGAN) tablet 12.5 mg  12.5 mg Oral Q6H PRN Melanie Ayala, APRN        sodium bicarbonate tablet 650 mg  650 mg Oral 5x Daily Melanie Ayala, APRN   650 mg at 04/24/25 2005    tamsulosin (FLOMAX) 24 hr capsule 0.4 mg  0.4 mg Oral Nightly Melanie Ayala, APRN   0.4 mg at 04/24/25 2004    traMADol (ULTRAM) tablet 50 mg  50 mg Oral BID PRN Melanie Ayala, APRN   50 mg at 04/24/25 2004     Facility-Administered Medications Ordered in Other Encounters   Medication Dose Route Frequency Provider Last Rate Last Admin    sodium chloride 0.9 % infusion   Intravenous Continuous PRN Rinaldi, Carlos A L, CRNA   New Bag at 04/24/25 1524       Past Medical History:  Past Medical History:   Diagnosis Date    3-vessel CAD 08/11/2020    Allergic rhinitis     Anemia     Anxiety disorder 04/27/2020    Arthritis     Asymmetrical sensorineural hearing loss 06/28/2017    Atherosclerosis of native artery of both lower extremities with intermittent claudication 07/18/2019    Avascular necrosis of femoral head, left 07/11/2020    right hip after surgery     Carotid stenosis     Chronic mucoid otitis media     Chronic rhinitis     COPD (chronic obstructive pulmonary disease)     Coronary artery disease     HEART BYPASS 2004    Crohn's disease of large intestine with other complication 07/30/2020    Chronic diarrhea Colonoscopy July 2020 revealed mild patchy scattered hemosiderin staining with inflammation more so in rectosigmoid area.  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    Iron deficiency anemia 07/14/2020    Mixed hearing loss of left ear     PAD (peripheral artery disease) 08/11/2020    Perianal abscess     Pernicious anemia 08/17/2020    took shots but never diagnosed with b12 deficiency    Personal history of alcoholism 08/11/2020    quit drinking in 2013    Prostatic hypertrophy 08/11/2020    Sensorineural hearing loss     Sepsis with acute renal failure 09/15/2020    Shortness of breath 05/27/2021    Tinnitus     Ventricular tachycardia, nonsustained 07/14/2020    Weight loss 07/11/2020       Past Surgical History:  Past Surgical History:   Procedure Laterality Date    ARTERY SURGERY  2021    right side on neck    CAROTID ENDARTERECTOMY Right 05/10/2021    Procedure: RIGHT CAROTID ENDARTERECTOMY WITH EEG;  Surgeon: Gil Pineda DO;  Location: [facility] OR ;  Service: Vascular;  Laterality: Right;    COLONOSCOPY N/A 07/02/2020    Procedure: COLONOSCOPY WITH ANESTHESIA;  Surgeon: Adrien Brewster MD;  Location: [facility] ENDOSCOPY;  Service: Gastroenterology;  Laterality: N/A;  pre op: diarrhea  post op: polyps  PCP: Joe Velasco MD    COLONOSCOPY N/A 10/13/2020    Procedure: COLONOSCOPY  WITH ANESTHESIA;  Surgeon: Adrien Brewster MD;  Location: [facility] ENDOSCOPY;  Service: Gastroenterology;  Laterality: N/A;  Pre: Chronic Diarrhea, Crohn's  Post: AVM  Dr. Neftali Velasco  CO2 Inflation Used    COLONOSCOPY N/A 12/08/2023    Procedure: COLONOSCOPY WITH ANESTHESIA;  Surgeon: Adrien Brewster MD;  Location: [facility] ENDOSCOPY;  Service: Gastroenterology;  Laterality: N/A;  pre chrone's disease  post sub optimal prep, polyp, chrone's      CORONARY ARTERY BYPASS GRAFT  2003    x3    ENDOSCOPY N/A 11/02/2021    Procedure: ESOPHAGOGASTRODUODENOSCOPY WITH ANESTHESIA;  Surgeon: Bridger Bell MD;  Location: [facility] ENDOSCOPY;  Service: Gastroenterology;  Laterality: N/A;  pre anemia;gi bleed  post  gi bleed;schatski ring  Dr. ERIC Velasco    ENDOSCOPY N/A 10/10/2023    Procedure: ESOPHAGOGASTRODUODENOSCOPY WITH ANESTHESIA;  Surgeon: Adrien Brewster MD;  Location: [facility] ENDOSCOPY;  Service: Gastroenterology;  Laterality: N/A;  preop; anemia  postop esophagitis ; R/O barretts   PCP Randall Beata    EYE SURGERY Bilateral     catorac    INCISION AND DRAINAGE PERIRECTAL ABSCESS N/A 03/03/2017    Procedure: INCISION AND DRAINAGE OF JEET ANAL ABSCESS;  Surgeon: Lynette Smith MD;  Location: [facility] OR;  Service:     INGUINAL HERNIA REPAIR Bilateral 06/27/2023    Procedure: INGUINAL HERNIA BILATERAL REPAIR LAPAROSCOPIC WITH DAVINCI ROBOT WITH MESH;  Surgeon: Tahira Rivera MD;  Location: [facility] OR;  Service: Robotics - DaVinci;  Laterality: Bilateral;    INSERTION HEMODIALYSIS CATHETER N/A 4/16/2025    Procedure: HEMODIALYSIS CATHETER PLACEMENT;  Surgeon: Gil Pineda DO;  Location: [facility] HYBRID OR;  Service: Vascular;  Laterality: N/A;    MYRINGOTOMY W/ TUBES Left 04/17/2017    06/10/2016    TONSILLECTOMY      TOTAL HIP ARTHROPLASTY Right 2006       Family History  Family History   Problem Relation Age of Onset    Breast cancer Mother     Dementia Father     Glaucoma Father     No Known  Problems Daughter     Colon polyps Neg Hx     Colon cancer Neg Hx        Social History  Social History     Socioeconomic History    Marital status:    Tobacco Use    Smoking status: Former     Current packs/day: 0.00     Average packs/day: 0.5 packs/day for 25.8 years (12.9 ttl pk-yrs)     Types: Cigarettes     Start date:      Quit date: 10/13/2013     Years since quittin.5     Passive exposure: Past    Smokeless tobacco: Never    Tobacco comments:     quit 2013   Vaping Use    Vaping status: Former    Substances: Nicotine    Devices: Pre-filled or refillable cartridge   Substance and Sexual Activity    Alcohol use: Not Currently    Drug use: No    Sexual activity: Not Currently     Partners: Female       Review of Systems:  History obtained from chart review and the patient  General ROS: No fever or chills  Respiratory ROS: No cough, shortness of breath, wheezing  Cardiovascular ROS: No chest pain or palpitations  Gastrointestinal ROS: No abdominal pain or melena  Genito-Urinary ROS: No dysuria or hematuria  Psych ROS: No anxiety and depression  14 point ROS reviewed with the patient and negative except as noted above and in the HPI unless unable to obtain.    Objective:  Patient Vitals for the past 24 hrs:   BP Temp Temp src Pulse Resp SpO2   25 0432 147/46 98.3 °F (36.8 °C) Oral 62 16 96 %   25 2350 150/46 98.4 °F (36.9 °C) Oral 54 16 95 %   25 1922 175/46 98.3 °F (36.8 °C) Oral 56 16 96 %   25 1555 (!) 151/36 97.7 °F (36.5 °C) Oral 52 16 97 %   25 1500 -- -- -- (!) 49 -- 99 %   25 1455 -- -- -- (!) 49 -- 100 %   25 1450 -- -- -- 50 -- 100 %   25 1445 -- -- -- 50 -- 99 %   25 1440 -- -- -- (!) 49 -- 99 %   25 1300 (!) 106/36 -- -- (!) 49 16 --   25 1137 (!) 204/59 97.1 °F (36.2 °C) Oral 102 16 96 %       Intake/Output Summary (Last 24 hours) at 2025 0802  Last data filed at 2025 0432  Gross per 24 hour   Intake --  "  Output 1000 ml   Net -1000 ml     General: awake/alert   Chest:  clear to auscultation bilaterally without respiratory distress  CVS: regular rate and rhythm  Abdominal: soft, nontender, positive bowel sounds  Extremities: no cyanosis or edema  Skin: warm and dry without rash      Labs:  Results from last 7 days   Lab Units 04/24/25  0504 04/23/25  0619 04/22/25  0245   WBC 10*3/mm3 4.20 6.06 4.42   HEMOGLOBIN g/dL 9.9* 9.6* 8.2*   HEMATOCRIT % 31.5* 30.9* 27.1*   PLATELETS 10*3/mm3 100* 124* 97*         Results from last 7 days   Lab Units 04/24/25  0504 04/23/25  0619 04/22/25  0245 04/21/25  0850 04/20/25  1732 04/20/25  1400   SODIUM mmol/L 135* 136 138 137  137   < >  --    POTASSIUM mmol/L 4.2 4.1 4.4 4.9  4.9   < >  --    CHLORIDE mmol/L 99 99 101 105  105   < >  --    CO2 mmol/L 25.0 27.0 25.0 17.0*  17.0*   < >  --    BUN mg/dL 36* 44* 55* 99*  99*   < >  --    CREATININE mg/dL 2.87* 2.80* 3.50* 4.35*  4.35*   < >  --    CALCIUM mg/dL 8.5* 8.3* 8.3* 8.5*  8.5*   < >  --    EGFR mL/min/1.73 21.9* 22.5* 17.2* 13.3*  13.3*   < >  --    BILIRUBIN mg/dL  --   --   --  0.2  --  0.2   ALK PHOS U/L  --   --   --  145*  --  176*   ALT (SGPT) U/L  --   --   --  10  --  10   AST (SGOT) U/L  --   --   --  14  --  17   GLUCOSE mg/dL 89 100* 93 107*  107*   < >  --     < > = values in this interval not displayed.       Radiology:   Imaging Results (Last 72 Hours)       ** No results found for the last 72 hours. **            Culture:  No results found for: \"BLOODCX\", \"URINECX\", \"WOUNDCX\", \"MRSACX\", \"RESPCX\", \"STOOLCX\"      Assessment    Chronic kidney disease stage 5 now with progression to end stage renal disease  Hypertension  Hyperkalemia--improved  Severe anemia--s/p PRBC transfusion on 4/20  Secondary hyperparathyroidism  Metabolic acidosis    Plan:  Stop IV fluids  Routine hemodialysis today  Planning for angiogram later today  OK for discharge from renal standpoint when OK with others. Follow up via " dialysis clinic      Slava Tate, APRN  4/25/2025  08:02 CDT

## 2025-04-25 NOTE — NURSING NOTE
[facility]  DIALYSIS TREATMENT SUMMARY     Note: Consult with the attending physician for patient treatment orders, this document is not a physician order.     Patient Information  Patient Ricardo Hugo  Date of Birth February 22, 1950  Chart Number [id number]  Location [facility]  Location MRN [id number]  Gender Male  SSN (last 4)   Treatment Information  Treatment Type Hemodialysis  Treatment Id [id number]  Start Time April 25, 2025 07:35  End Time April 25, 2025 10:59  Acutal Duration 03:24  Treatment Balances  Total Saline Administered 500  Net Fluid Balance -1400  Reason Goal Not Met Treatment Stopped - MD Aware  Hemodialysis Orders  Therapy Standard  Orders Verified Time 04/25/2025 07:15   Date Verified 04/25/2025  Duration 3:30  Isolated UF/Bypass No  BFR (mL) 450  DFR (mL) 700  Dialyzer Type OPTIFLUX 160NR  UF Order UF Range  UF Range (mL) 1500 - 2000  With BP Parameters Yes  As Tolerated Yes  Crit-Line used No  Heparin Initial Units Bolus No  Heparin IV Maintenance Bolus No  Heparin IV Infusion No  Potassium (mEq/L) 3  Calcium (mEq/L) 2.5  Bicarb (mg/dL) 35  Sodium (mEq/L) 140  Clinician Ginny Cancino RN  Dialysis Access  Access Type Central Venous Catheter  Central Venous Catheter  Access Type Catheter - Tunneled  Access Location Chest Wall - R  Current/New [facility] Patient Yes  1st Use Catheter Verified by Previous Use  Catheter Care Completed per Policy Yes  Dressing Dry and Intact on Arrival Yes  Dressing Changed Yes  Type of Dressing Film Biopatch/CHG  Dressing Changed By [facility] Staff  Type of HD Caps Curos  HD Caps Changed Yes  CVC Line Education Provided Yes - Infection Prevention  Vitals  Pre-Treatment Vitals  Time Is BP being recorded? Pre BP Map BP Method Pulse RR Temp How was Weight Obtained? Pre Weight Previous Dry Weight Previous Post Weight Metric Target Fluid Removal (mL) Dialysate Confirmed Clinician  04/25/2025  07:30 BP/Map 163/64 (97) Noninvasive 56 16 97.7 How Obtained: Reported Floor Weight 60.1   Kgs 2000 Yes Clare Fabian  Comments: pre-vitals  Intra Procedure Vitals  Rec Type Time Is BP being recorded? BP Map Pulse RR BFR DFR AP  TMP UFR RMVD Bolus NSS Flush Chills Safety Check Crit-Line HCT Crit-Line BV% Clinician  E 04/25/2025 07:35 BP/Map 169/64 (99) 58 16 450 700 -180 140 70 580 0    Yes   Clare Fabian  Comments: Dialysis initiated by JAMA Cancino RN, dressing changed per policy  E 04/25/2025 08:00 BP/Map 177/57 (97) 61 16 450 700 -200 170 70 560 201    Yes   Clare Fabian  Comments: Pt trying to use urinal. VSS  E 04/25/2025 08:30 BP/Map 175/65 (102) 61 16 450 700 -200 170 60 560 531    Yes   Clare Fabian  Comments: Pt still trying to use urinal, doesn't want it taken away.  E 04/25/2025 09:00 BP/Map 168/63 (98) 58 18 450 700 -200 150 70 610 895    Yes   Ginny Cancino RN  Comments: Watching tv, voided 200cc per urinal  E 04/25/2025 09:30 BP/Map 194/55 (101) 55 16 300 700 -200 80 90 620     Yes   Clare Fabian  Comments: BFR turned down due to arterial spasms. RN notified.  E 04/25/2025 10:00 BP/Map 150/58 (89) 52 16 300 700 -120 80 90 620 1354    Yes   Clare Fabian  Comments: RN @ bedside trying to reverse lines.  E 04/25/2025 10:30 BP/Map 159/58 (92) 51 18 300 700 -110 90 50 820 1639       Ginny Cancino RN  Comments: Working on computer, no complaints, unable to achieve ordered BFR due to arterial spasms, Dr Rueda made aware  E 04/25/2025 10:59 BP/Map 177/65 (102) 54        1900    Yes   Clare Fabian  Comments: HD complete.  Post Treatment Vitals  Time Is BP being recorded? BP Map Pulse RR Temp How was Weight Obtained? Post Weight Metric BVP UF Goal Ordered NSS Given Intra-Procedure Total Machine UF Removed (mL) Crit-Line Ending Profile Crit-Line Refill Crit-Line Ending HCT Crit-Line Max BV% Clinician  04/25/2025 11:14 BP/Map 187/65 (106) 55 16 97.7 How Obtained: Reported Floor  Weight 58.7 Kgs 71.5 1500 0 1900     Clare Fabian  Comments: Pt tolerated tx, pt transported to OR in stable condition.  Safety checks include: access uncovered and secured, Hemaclip secured for all central line access, machine checks performed, and alarm limits confirmed.  Labs  Hepatitis  HBsAG Lab Result HBsAG Lab Result HBsAG Draw Date Transient Antigenemia(MD Diagnosis Only) Anti-HBs Lab Result Anti-HBs Lab Value Anti-HBs Draw Date Documented By Documented Date Hepatitis Status Hepatitis Status  Negative  04/20/2025  Negative  04/20/2025 Clare Fabian 04/25/2025  Susceptible  Notes:   Pre-Treatment Hepatitis Precautions Copy of hepatitis results verified in hospital EMR Yes Signed By Ginny Cancino RN  Patient tx outside buffer zone Yes Hepatitis Information Entered By Ginny Cancino RN   Hard copy verified by nurse and placed in patient’s hospital chart Yes Signing   Pre-Treatment Handoff  Staff Report Received Yes  Report Given by Primary Nurse Sonya Fontaine  Time 07:00  Patient Arrival  Patient ID Verified Date of Birth  Full Name  Patient Consent to treatment verified Yes  Blood Transfusion Consent Verified N/A  Treatment Comments  Treatment Notes Pt tolerated tx, pt transported to OR in stable condition.  Post-Treatment Handoff  Report Given to Primary Nurse Imelda Beck  Time Report Given 11:16  Report Given By Ginny Cancino RN  Machine Validation  Time 07:00  Date 4/25/2025  Auto Alarm Test Passed Yes  Machine Serial # [id number]  Portable RO Yes  RO Serial# [id number]  Residual Bleach Negative Yes  Was a manufactured mix used? No  Machine Log Completed Yes  Total Chlorine (less than 0.1)? Yes  Total Chlorine Log Completed Yes  Bicarb BiBag  Bibag Size 900  Machine Temp 36.5  Machine Conductivity 13.6  Meter Type N/A  Meter Conductivity   Independent Conductivity 13.6  pH Status Pass Pass  pH   TCD Value 13.9  TCD Alarm with +/- 0.5 Yes  NVL enabled validated 100 asymmetric Yes  Safety check complete Clare MAGALLON  Rui  Second Verification Performed? Yes  Second Verification Performed By Ginny Cancino RN  Reason Not Verified   Serum Lab Values  Time BUN Creatinine Na K (mEq/L) Cl CO2 Ca (mEq/L) Phos Mg (mg/dL) Alb (g/dL) Glucose Hgb Hct WBC Plt PT aPTT INR Other Clinician  04/25/2025 06:00 - - - - - - - - - - - -  - -     Clare Fabian  Comments: no labs drawn, none requested.  Facility Information Room # 384 Diagnosis  Facility Information Bed # 3 Diagnosis Anemia, multifactorial to include chronic kidney disease, iron deficiency and possible bleed  Admission Date 04/20/2025 Stat Treatment No Isolation Information  Ordering MD Rueda Patient Type New patient to dialysis with diagnosis of “Acute Kidney Injury” Isolation Required? N  Account/Finance Number [id number] Patient Chronic Unit [facility] Completed by  Admission Status InPatient Patient Home Unit TBD General Tx information Entered by Ginny Cancino RN  Location Dialysis Suite Multi Code Status Full Code   Start Treatment Time Out Confirmed by MAHENDRA Cancino RN, [facility] Correct access site verified Yes  Treatment Initiation Connections Secured Time Out Completed 07:25 Treatment Start Date 04/25/2025  Saline line double clamped Correct patient verified Yes Treatment Start Time 07:35  Hemaclip Applied Correct procedure verified Yes Patient/Family questions and concerns addressed Yes  Pre Focused Assessment Edema LOC Alert and Oriented x3  Access Location Generalized GI / Bowels  Signs and Symptoms of Infection? No Edema Grade 1+ GI Soft  Pain Screening Cardiac   Does the patient have pain? No Heart Rhythm Irregular  Voids  Respiratory Telemetry Yes Completed by  Lung Sounds Diminished Skin Pre Treatment Focused Assessment Completed By Ginny Cancino RN  Location Bases Skin Warm Time 07:29  Position Anterior  Dry Signing  Respiratory Efforts Unlabored LOC Signed By Ginny Cancino RN  Education  Infection Prevention Focus or Topic Hemodialysis treatment  review  Patient Education Method Knowledge / Understanding Assessed Teach back Method Knowledge / Understanding Assessed Teach back  Patient Educated? Yes Family Education Provided? N/A Patient Education Introduced By  Focus or Topic Treatment Options Caregiver Education Provided? Yes Patient Education Introduced By Ginny Cancino RN  Post-Treatment Delay N Post Treatment General Information  Post Treatment Machine Disinfection Requirement Notes Pt tolerated tx, pt transported to OR in stable condition. Pt off dialysis 6 min early due to OR calling for pt, unable to achieve ordered UF due to this, floor nurse at bedside receiving report and retrieving pt laptop and phone  Explain if ordered duration not met Off a few min early due to having procedure in the OR HD machine external disinfection completed per policy Yes Completed by  Post Treatment Delay PRO external disinfection completed per policy Yes Post Treatment Form Completed By Ginny Cancino RN  Post Focused Assessment Lung Sounds Diminished LOC  Changes from Pre Focused Assessment Location Bases LOC Alert and Oriented x3  Changes from Pre Treatment Focused Assessment? No Position Anterior GI / Bowels  Access Respiratory Efforts Unlabored GI Soft  Cath Packed with heparin 1000 units/cc Edema   Access Port(ml) 1.6 Location Generalized  Voids  Return Port(ml) 1.6 Edema Grade 1+ Completed by  Catheter clamped and capped Yes Cardiac Post Treatment Focused Assessment Completed by Ginny Cancino RN  Access Flow Good Heart Rhythm Irregular Date 04/25/2025  Dialyzer Clearance Streaked Telemetry Yes Time 11:15  Pain Screening Skin Signing  Does the patient have pain? No Skin Warm Signed By Ginny Cancino RN  Respiratory  Dry

## 2025-04-25 NOTE — ANESTHESIA POSTPROCEDURE EVALUATION
"Patient: Ricardo Hugo    Procedure Summary       Date: 04/25/25 Room / Location:  [facility] OR  /  [facility] HYBRID OR    Anesthesia Start: 1213 Anesthesia Stop: 1413    Procedure: RIGHT LOWER EXTREMITY ANGIOGRAM, SHOCKWAVE LITHOTRIPSY, BALLOON ANGIOPLASTY, MYNX CLOSURE (Right: Groin) Diagnosis:       PAD (peripheral artery disease)      (PAD (peripheral artery disease) [I73.9])    Surgeons: iGl Pineda DO Provider: Stan Chopra CRNA    Anesthesia Type: MAC ASA Status: 4            Anesthesia Type: MAC    Vitals  Vitals Value Taken Time   /59 04/25/25 16:21   Temp 98.3 °F (36.8 °C) 04/25/25 14:10   Pulse 56 04/25/25 16:22   Resp 18 04/25/25 14:35   SpO2 92 % 04/25/25 16:22   Vitals shown include unfiled device data.        Post Anesthesia Care and Evaluation    Patient location during evaluation: PACU  Patient participation: complete - patient participated  Level of consciousness: awake and alert  Pain management: adequate    Airway patency: patent  Anesthetic complications: No anesthetic complications    Cardiovascular status: acceptable  Respiratory status: acceptable  Hydration status: acceptable    Comments: Blood pressure 117/46, pulse (!) 46, temperature 98.3 °F (36.8 °C), temperature source Temporal, resp. rate 18, height 182.9 cm (72\"), weight 60.1 kg (132 lb 9.6 oz), SpO2 100%.    Pt discharged from PACU based on jennifer score >8    "

## 2025-04-25 NOTE — CASE MANAGEMENT/SOCIAL WORK
Continued Stay Note  NANDINI Oneill     Patient Name: Ricardo Hugo  MRN: [id number]  Today's Date: 4/25/2025    Admit Date: 4/20/2025    Plan: Home   Discharge Plan       Row Name 04/25/25 1220       Plan    Plan Home    Patient/Family in Agreement with Plan yes    Plan Comments Pt's chair time is TTS 1040 at the [facility]. He can start tomorrow if d/c'ed today. [facility] Kathleen.                   Discharge Codes    No documentation.                       LINNETTE Ramos

## 2025-04-25 NOTE — PROGRESS NOTES
Patient Name: Ricardo Hugo  Date of Admission: 4/20/2025  Today's Date: 04/25/25  Length of Stay: 5  Primary Care Physician: Nathan Zimmer MD    Subjective   Chief Complaint: Right lower extremity pain  Leg Pain       Patient has no new complaint, went for angiogram today.    Documented weights    04/20/25 0750 04/21/25 0501 04/22/25 0453 04/23/25 0600   Weight: 60.3 kg (133 lb) 67.5 kg (148 lb 12.8 oz) 61.6 kg (135 lb 14.4 oz) 60.9 kg (134 lb 3.2 oz)    04/24/25 0634   Weight: 60.1 kg (132 lb 9.6 oz)        Intake/Output Summary (Last 24 hours) at 4/25/2025 1655  Last data filed at 4/25/2025 1221  Gross per 24 hour   Intake 100 ml   Output 600 ml   Net -500 ml       Results for orders placed during the hospital encounter of 01/10/25    Adult Transthoracic Echo Complete w/ Color, Spectral and Contrast if Necessary Per Protocol    Interpretation Summary    Left ventricular systolic function is normal. Left ventricular ejection fraction appears to be 56 - 60%.    Left ventricular wall thickness is consistent with mild septal asymmetric hypertrophy.    Left ventricular diastolic function is consistent with (grade II w/high LAP) pseudonormalization.    Normal right ventricular cavity size and systolic function noted.    The left atrial cavity is mild to moderately dilated.    The right atrial cavity is dilated.    Mild aortic valve stenosis is present.    Mild to moderate mitral valve regurgitation is present.    Moderate to severe tricuspid valve regurgitation is present.    Estimated right ventricular systolic pressure from tricuspid regurgitation is markedly elevated (>55 mmHg).       Review of Systems   All pertinent negatives and positives are as above. All other systems have been reviewed and are negative unless otherwise stated.     Objective    Temp:  [97.4 °F (36.3 °C)-98.4 °F (36.9 °C)] 97.4 °F (36.3 °C)  Heart Rate:  [44-62] 59  Resp:  [9-18] 18  BP: (108-179)/(41-63) 175/58  Physical Exam  Vitals  and nursing note reviewed.   Constitutional:       Appearance: He is ill-appearing (Chronic).   HENT:      Head: Normocephalic and atraumatic.      Mouth/Throat:      Mouth: Mucous membranes are moist.      Pharynx: Oropharynx is clear.   Eyes:      Extraocular Movements: Extraocular movements intact.      Conjunctiva/sclera: Conjunctivae normal.   Cardiovascular:      Rate and Rhythm: Normal rate and regular rhythm.      Heart sounds: Murmur heard.   Pulmonary:      Effort: Pulmonary effort is normal.      Breath sounds: Normal breath sounds.   Abdominal:      General: There is no distension.      Palpations: Abdomen is soft.   Musculoskeletal:      Cervical back: Normal range of motion and neck supple.      Right lower leg: No edema.      Left lower leg: No edema.   Skin:     General: Skin is warm and dry.      Comments: Bilateral lower extremities dusky, Multiple bruises scattered.  Oozing blood noted at right permacath site-did not remove dressing to evaluate.  Nurses have been changing frequently   Neurological:      General: No focal deficit present.      Mental Status: He is alert and oriented to person, place, and time.   Psychiatric:         Mood and Affect: Mood normal.         Behavior: Behavior normal.       Results Review:  I have reviewed the labs, radiology results, and diagnostic studies.    Laboratory Data:   Results from last 7 days   Lab Units 04/24/25  0504 04/23/25  0619 04/22/25  0245   WBC 10*3/mm3 4.20 6.06 4.42   HEMOGLOBIN g/dL 9.9* 9.6* 8.2*   HEMATOCRIT % 31.5* 30.9* 27.1*   PLATELETS 10*3/mm3 100* 124* 97*        Results from last 7 days   Lab Units 04/25/25  1132 04/24/25  0504 04/23/25  0619 04/22/25  0245 04/21/25  0850 04/20/25  1732 04/20/25  1400   SODIUM mmol/L 138 135* 136   < > 137  137   < >  --    POTASSIUM mmol/L 3.7 4.2 4.1   < > 4.9  4.9   < >  --    CHLORIDE mmol/L 100 99 99   < > 105  105   < >  --    CO2 mmol/L 28.0 25.0 27.0   < > 17.0*  17.0*   < >  --    BUN mg/dL  20 36* 44*   < > 99*  99*   < >  --    CREATININE mg/dL 1.59* 2.87* 2.80*   < > 4.35*  4.35*   < >  --    CALCIUM mg/dL 8.4* 8.5* 8.3*   < > 8.5*  8.5*   < >  --    BILIRUBIN mg/dL  --   --   --   --  0.2  --  0.2   ALK PHOS U/L  --   --   --   --  145*  --  176*   ALT (SGPT) U/L  --   --   --   --  10  --  10   AST (SGOT) U/L  --   --   --   --  14  --  17   GLUCOSE mg/dL 84 89 100*   < > 107*  107*   < >  --     < > = values in this interval not displayed.       Culture Data:     Microbiology Results (last 10 days)       Procedure Component Value - Date/Time    Eosinophil Smear - Urine, Urine, Clean Catch [[id number]]  (Normal) Collected: 04/20/25 1509    Lab Status: Final result Specimen: Urine, Clean Catch Updated: 04/21/25 0020     Eosinophil Smear 0 % EOS/100 Cells              Radiology Data:   Imaging Results (Last 7 Days)       Procedure Component Value Units Date/Time    FL C Arm During Surgery [[id number]] Resulted: 04/25/25 1351     Updated: 04/25/25 1351    Narrative:      This procedure was auto-finalized with no dictation required.    IR Angiogram Extremity [[id number]] Resulted: 04/25/25 1218     Updated: 04/25/25 1351    CT Angio Abdominal Aorta Bilateral Iliofem Runoff [[id number]] Collected: 04/21/25 1621     Updated: 04/21/25 1636    Narrative:      EXAM/TECHNIQUE: CT angiography with 3D MIP images abdomen and pelvis  with bilateral lower extremity runoff     INDICATION: aortoiliac disease right lower extremity ischemia;  N18.9-Chronic kidney disease, unspecified; E87.5-Hyperkalemia;  D64.9-Anemia, unspecified; M54.16-Radiculopathy, lumbar region;  I73.9-Peripheral vascular disease, unspecified     COMPARISON: 9/15/2020     DLP: 479.06 mGy.cm. Automated exposure control was utilized to decrease  patient radiation dose.     FINDINGS:     Nonaneurysmal abdominal aorta with heavy atherosclerotic calcification.  Celiac artery SMA, and MIKI are widely patent. Mild atherosclerotic  narrowing of the left  main renal artery. There are 2 renal arteries  bilaterally.     The right common iliac and internal iliac arteries are widely patent.  Focal moderate to severe atherosclerotic stenosis of the right external  iliac artery on series 4 image 155. The right common femoral artery is  widely patent and contains heavy atherosclerotic calcification. Right  profunda is widely patent. Heavy atherosclerotic calcification  throughout the right SFA with multifocal areas of moderate stenosis.  Patent atherosclerotic popliteal artery. Three-vessel right lower  extremity runoff.     The left common iliac and internal iliac artery are widely patent.  Moderate atherosclerotic stenosis of the left external iliac artery on  series 4 image 159. Left common femoral artery is widely patent. Left  profunda is widely patent. Heavy atherosclerotic calcification  throughout the SFA with multifocal areas of proximal stenosis. The left  SFA appears occluded along its mid course (series 4 image 297) with  reconstitution occurring at the distal vessel. After reconstitution,  there is an area of severe left SFA stenosis on series 4 image 359.  Patent left popliteal artery. Three-vessel left lower extremity runoff  appears maintained.     Nonvascular findings:     Moderate right pleural effusion with overlying atelectasis. Trace left  pleural effusion. Cardiomegaly. Liver appears cirrhotic. 9 mm arterial  enhancing lesion in the left liver on image 54. No gallstone or biliary  ductal dilatation. The pancreas, spleen, and adrenal glands are  unremarkable. Multifocal bilateral renal cortical scarring. No  hydronephrosis or large calculi. No focal urinary bladder wall  thickening.      Moderate volume stool throughout the colon. No colonic wall thickening  or pericolonic fat stranding. No small bowel distention or inflammation.     No evidence of ascites. There is diffuse edematous change throughout the  mesentery/peritoneum. No pelvic mass or  collection. Prostate is enlarged  measuring 5.3 cm transverse dimension. No pathologically enlarged  abdominal or pelvic lymph nodes.     Diffuse abdominal wall soft tissue edema. Right hip arthroplasty. Left  femoral head avascular necrosis with areas of subchondral collapse  noted.       Impression:         1.  Nonaneurysmal abdominal aorta with heavy atherosclerotic  calcification throughout.     2.  Focal moderate-severe atherosclerotic stenosis of the right external  iliac artery. Heavy atherosclerotic calcification throughout the right  SFA with multifocal areas of moderate stenosis. Three-vessel right lower  extremity runoff.     3.  Moderate atherosclerotic stenosis of the left external iliac artery.  Heavy atherosclerotic calcification throughout the left SFA with  multifocal moderate stenosis proximally and complete occlusion of the  mid vessel with reconstitution distally. After reconstitution, there is  an area of focal severe left SFA stenosis. Three-vessel left lower  extremity runoff appears maintained.     4.  Moderate right pleural effusion. Cardiomegaly.     5.  Liver appears cirrhotic. 9 mm enhancing lesion in the left liver on  image 54 series 4. Recommend follow-up liver protocol CT or MRI for  further characterization.     6.  Anasarca with diffuse abdominal wall soft tissue edema and extensive  diffuse edematous change throughout the mesentery and peritoneum.     7.  Advanced left femoral head avascular necrosis.           This report was signed and finalized on 4/21/2025 4:33 PM by Dr. Wellington Farley MD.       US Arterial Doppler Lower Extremity Complete [[id number]] Collected: 04/21/25 1419     Updated: 04/21/25 1425    Narrative:      History: PAD     Comments: Grayscale imaging as well as color flow duplex were used to  evaluate bilateral lower extremity arterial systems.     On the right, the peak systolic velocity in the common femoral artery  measured 31.7 cm/s. In the profunda  femoris artery measured 50.3 cm/s.  The proximal SFA appears occluded. The mid SFA measured 20.1 cm/s. In  the distal SFA measured 48.6 cm/s. In the popliteal artery measured 25.3  cm/s. In the posterior tibial artery measured 20.7 cm/s. In the anterior  tibial artery measured 35.1 cm/s.     On the left, the peak systolic velocity in the common femoral artery  measured 103.2 cm/s. In the profunda femoris artery measured 123.3 cm/s.  In the proximal SFA measured 26.5 cm/s. The mid SFA appears occluded. In  the distal SFA measured 27.3 cm/s. In the popliteal artery measured 16.3  cm/s. In the posterior tibial artery measured 55.8 cm/s. In the anterior  tibial artery measured 42.5 cm/s.       Impression:      In the right lower extremity, there is heavy plaque burden  in the common femoral artery with an occluded proximal SFA. There is  reconstitution in the mid SFA but dampened flow throughout the lower  extremity with heavy plaque burden throughout. In the left lower  extremity, there is also heavy plaque burden in the common femoral  artery with an occluded mid SFA. There is reconstitution in the distal  SFA with dampened flow throughout the lower extremity with heavy plaque  burden throughout. Further imaging/evaluation may be warranted.     This report was signed and finalized on 4/21/2025 2:22 PM by Dr. Gil Pineda MD.        Renal Bilateral [[id number]] Collected: 04/20/25 1717     Updated: 04/20/25 1722    Narrative:      RENAL ULTRASOUND COMPLETE 4/20/2025 3:47 PM     REASON FOR EXAM: Renal Failure; N18.9-Chronic kidney disease,  unspecified; E87.5-Hyperkalemia; D64.9-Anemia, unspecified;  M54.16-Radiculopathy, lumbar region; I73.9-Peripheral vascular disease,  unspecified.     COMPARISON: None.       TECHNIQUE: Multiple longitudinal and transverse realtime sonographic  images of the kidneys and urinary bladder are obtained.     FINDINGS:     RIGHT KIDNEY: The right kidney measures 9.7 cm in length. The  cortical  thickness is normal. There is increased cortical echogenicity. There is  a mid to lower pole cyst measuring 1.7 x 1.4 x 1.6 cm. There is no  hydronephrosis. No nephrolithiasis or abnormal perinephric fluid  collections.     LEFT KIDNEY: The left kidney measures 10.1 cm in length. The cortical  thickness is normal. There is increased cortical echogenicity. There is  a 1 x 0.7 x 0.9 cm cyst in the lower pole. There is no hydronephrosis.  No nephrolithiasis or abnormal perinephric fluid collections.     PELVIS: There is mild thickening of the bladder wall. There is no free  fluid in the pelvis.     ADDITIONAL FINDINGS: The abdominal aorta is normal caliber. There is  atheromatous calcification.       Impression:      1. Increased cortical echogenicity of both kidneys consistent with  medical renal disease. No hydronephrosis.  2. Bilateral renal cysts.  3. Mild wall thickening of the urinary bladder which may be related to  muscular hypertrophy. Acute and chronic inflammation and infection are  also included in the differential. Neoplasm less likely.  4. Atheromatous abdominal aorta.           The images and report are stored on PAC's per institutional and state  guidelines.           This report was signed and finalized on 4/20/2025 5:19 PM by Dr. Mikael Gallegos MD.       XR Chest 1 View [[id number]] Collected: 04/20/25 1155     Updated: 04/20/25 1159    Narrative:      XR CHEST 1 VW- 4/20/2025 10:40 AM     HISTORY: Preop; N18.9-Chronic kidney disease, unspecified;  E87.5-Hyperkalemia; D64.9-Anemia, unspecified; M54.16-Radiculopathy,  lumbar region; I73.9-Peripheral vascular disease, unspecified       COMPARISON: Chest x-ray dated 4/13/2025     FINDINGS:  Upright frontal radiograph of the chest was obtained     Bilateral interstitial coarsening with subpleural lines reflecting  interstitial edema. Trace left-sided and small to moderate sided right  pleural effusions. No consolidation or pneumothorax. Prior  coronary  bypass. Right IJ dual-lumen central venous catheter. No acute bony  abnormality.       Impression:      1.  Volume overload with interstitial edema and bilateral pleural  effusions as detailed above.     This report was signed and finalized on 4/20/2025 11:56 AM by Dr Tyrell Knox.       CT Lumbar Spine Without Contrast [[id number]] Collected: 04/20/25 0947     Updated: 04/20/25 0953    Narrative:      CT LUMBAR SPINE WO CONTRAST- 4/20/2025 8:24 AM     HISTORY: Right-sided lumbar radiculopathy     COMPARISON: None      DOSE LENGTH PRODUCT: 439.68 mGy.cm. Automated exposure control was also  utilized to decrease patient radiation dose.     TECHNIQUE: Serial helical tomographic images of the lumbar spine were  obtained without the use of intravenous contrast. Additionally, sagittal  and coronal reformatted images were provided for review. Automated  exposure control is utilized to reduce patient radiation dose.     FINDINGS:     Counting will assume 5 lumbar-type vertebrae. The spine is imaged from  T12 to S3.     There is no visualized acute fracture or traumatic subluxation. Lumbar  lordosis is maintained. Vertebral body heights are well maintained.  Multilevel loss of intervertebral disc height, L5-S1 in particular. No  high-grade bony narrowing of the spinal canal. Advanced facet  arthropathy. Posterior elements are intact. Neuroforaminal narrowing at  multiple levels, especially L5-S1 on the right. There is a layering  right-sided pleural effusion. Atheromatous vascular calcification along  the abdominal aorta. Incidentally noted sacral canal Tarlov cysts.  Included portion of the bony pelvis is intact.       Impression:      1. No acute osseous injury or malalignment.   2. Underlying advanced lumbar spine osteoarthritis changes including a  high-grade right-sided neuroforaminal narrowing at L5-S1.  3. Layering right-sided pleural effusion.           This report was signed and finalized on 4/20/2025  9:50 AM by Dr Tyrell Knox.                I have reviewed the patient's current medications.     [Transfer Hold] ALPRAZolam, 0.25 mg, Oral, Nightly  [Transfer Hold] aspirin, 81 mg, Oral, Daily  [Transfer Hold] atorvastatin, 10 mg, Oral, Daily  [Transfer Hold] calcitriol, 0.5 mcg, Oral, Daily  carvedilol, 25 mg, Oral, Q12H  ceFAZolin, 2,000 mg, Intravenous, Q8H  [Transfer Hold] cetirizine, 10 mg, Oral, Daily  cloNIDine, 0.3 mg, Oral, TID  cloNIDine, 1 patch, Transdermal, Weekly  [Transfer Hold] clopidogrel, 75 mg, Oral, Daily  [Transfer Hold] furosemide, 40 mg, Oral, BID  hydrALAZINE, 100 mg, Oral, TID  [Transfer Hold] isosorbide dinitrate, 10 mg, Oral, TID - Nitrates  [Transfer Hold] levothyroxine, 100 mcg, Oral, Q AM  [Transfer Hold] melatonin, 10 mg, Oral, Nightly  [Transfer Hold] mesalamine, 1.5 g, Oral, Daily  [Transfer Hold] montelukast, 10 mg, Oral, Nightly  NIFEdipine XL, 120 mg, Oral, Daily  [Transfer Hold] pantoprazole, 40 mg, Oral, Q AM  [Transfer Hold] sodium bicarbonate, 650 mg, Oral, 5x Daily  [Transfer Hold] tamsulosin, 0.4 mg, Oral, Nightly       Assessment/Plan   Assessment  Active Hospital Problems    Diagnosis     **PAD (peripheral artery disease)     Anemia, multifactorial to include chronic kidney disease, iron deficiency and possible bleed     Acute pain of lower extremity, right     Hyperkalemia     CKD (chronic kidney disease) stage 5     Resistant hypertension    Oozing at right permacath site    Treatment Plan  1.  Chronic kidney disease stage V/end-stage renal disease, nephrology following, patient had dialysis today and 2000 mL was removed, plans for HD again tomorrow, outpatient HD chair time arranged for Monday Wednesday Friday at [facility] however family notified today that patient would need to proceed with Tuesday/Thursday/Saturday treatments due to problems with chair availability.     2.  Anemia multifactorial; check anemia studies, transfused 1 unit packed red  blood cells on 4/19, Hgb stable, labs in a.m.     3.  Hyperkalemia; Lokelma 10 g 3 times daily today per nephrology, dialysis today, resolved, nephrology treating, labs in the a.m.     4.  Resistant hypertension: Home medications reviewed and restarted, monitor per protocol     5.  Acute pain right lower extremity;   She is status post angiogram today by vascular surgery and reported as follows-  PROCEDURE PERFORMED:   1.  Introduction of catheter/sheath into the aorta  2.  Aortoiliac angiogram with right lower extremity runoff  3.  Balloon angioplasty of bilateral  external iliac arteries using a 5 x 40 mm EverCross balloon  4.  Contralateral cannulation of the right common iliac artery  5.  Antegrade Crossing of a long SFA chronic total occlusion  6.  Predilatation/angioplasty of the entire superficial femoral artery using a 2.5 x 40 mm Tammy cross balloon and a 4 x 60 mm EverCross balloon  7.  Intravascular lithotripsy (IVL) of the entire SFA using a 5 x 80 mm Shockwave balloon  8.  Balloon angioplasty of the entire superficial femoral artery using a 5 x 250 mm Medtronic drug-coated balloon  9.  Completion right lower extremity angiogram with radiographic supervision and interpretation  10.  Mynx closure of the left common femoral artery    DVT prophylaxis with SCDs    Disposition-discharge planning for today/tomorrow       Electronically signed by Subhash Duran MD-BC, 04/25/25, 16:55 CDT.

## 2025-04-25 NOTE — OP NOTE
Ricardo Hugo  4/25/2025     PREOPERATIVE DIAGNOSIS: PAD (peripheral artery disease) [I73.9]     POSTOPERATIVE DIAGNOSIS: Post-Op Diagnosis Codes:     * PAD (peripheral artery disease) [I73.9]     PROCEDURE PERFORMED:   1.  Introduction of catheter/sheath into the aorta  2.  Aortoiliac angiogram with right lower extremity runoff  3.  Balloon angioplasty of bilateral  external iliac arteries using a 5 x 40 mm EverCross balloon  4.  Contralateral cannulation of the right common iliac artery  5.  Antegrade Crossing of a long SFA chronic total occlusion  6.  Predilatation/angioplasty of the entire superficial femoral artery using a 2.5 x 40 mm Tammy cross balloon and a 4 x 60 mm EverCross balloon  7.  Intravascular lithotripsy (IVL) of the entire SFA using a 5 x 80 mm Shockwave balloon  8.  Balloon angioplasty of the entire superficial femoral artery using a 5 x 250 mm Medtronic drug-coated balloon  9.  Completion right lower extremity angiogram with radiographic supervision and interpretation  10.  Mynx closure of the left common femoral artery     SURGEON: Gil Pineda DO      ANESTHESIA: MAC    PREPARATION: Routine.    STAFF: Circulator: Emilia Kline RN  Scrub Person: Ruma Dunbar; Tasha Browning  Assistant: Milagro Castro  Vascular Radiology Technician: Lela Schulte    Estimated Blood Loss: minimal    SPECIMENS: None    COMPLICATIONS: None    INDICATIONS: Ricardo Hugo is a 75 y.o. male  who presented with PMHx coronary artery disease, carotid disease, PVD, COPD, iron deficiency anemia, anxiety disorder, Crohn's disease, GERD, hypertension.  He recently underwent PermCath placement on 4/16/2025 and was scheduled for hemodialysis due to stage V/end-stage renal failure however he presented to [facility] ED with severe right leg pain with radiculopathy.  He does have a history of lumbar sciatic pain.  He does have complaints of right lower extremity ischemia pain.  He was found to have a hemoglobin of  6.6, potassium 6.4.  Patient received PRBCs.  Bilateral arterial duplex obtained in the ER.  Vascular surgery consulted. The indications, risks, and possible complications of the procedure were explained to the patient, who voiced understanding and wished to proceed with surgery.     PROCEDURE IN DETAIL: The patient was taken to the operating room and placed on the operating table in a supine position. After MAC anesthesia was obtained, the bilateral groins was prepped and draped in a sterile manner.  5 cc of 0.5% Marcaine plain was used to infiltrate the left groin for local anesthesia.  Using a micropuncture technique the left common femoral artery was cannulated and a microsheath was placed.  The advantage Glidewire was advanced into the aorta and a short 6 Yi sheath was placed.  The patient was given 1000 units of intravenous heparin.  The  proximal left external iliac artery had severe stenosis and was predilated with a 5 x 40 mm EverCross balloon.  The Omni Flush catheter was advanced into the aorta and an aortoiliac angiogram was performed.  Findings are as follows:   Patent renal arteries bilaterally without stenosis  Patent aorta without stenosis  Patent iliac systems bilaterally with evidence of focal stenoses in bilateral proximal common and external iliac arteries    Contralateral cannulation was established of the right common iliac artery.  The proximal external iliac artery lesion was predilated with a 5 x 40 mm EverCross balloon.  The catheter was then brought down to the level of the femoral head.  An angiogram with runoff was performed.  Findings are as follows:  Patent common femoral and profunda femoris arteries without stenosis  Patent proximal SFA for the first 4 to 5 cm then complete occlusion with reconstitution at the adductor hiatus.   Patent popliteal artery without stenosis but heavy plaque burden is noted.   Patent trifurcation with complete occlusion of all 3 vessels and multiple  collaterals heading down toward the ankle.  The posterior tibial artery and peroneal arteries were patent into the foot.     A 6 Italian by 45 cm destination sheath was placed down into the proximal common femoral artery.  The patient was given 6000 units of intravenous heparin.  With the help of the Buckley cross catheter, quick cross catheter, and 014 advantage Glidewire, the entire SFA  was traversed.  Most of the SFA occlusion had to be predilated with a 2.5 x 40 mm Tammy cross balloon and a 4 x 60 mm EverCross balloon.  Next, intravascular lithotripsy was then performed of the entire SFA  with a 5 x 80 mm Shockwave balloon.  Lastly, the entire SFA and popliteal artery down to the knee joint were balloon angioplasty with a 5 x 250 mm Medtronic drug-coated balloon.  Completion angiogram was performed which showed rapid flow down through the SFA and popliteal artery without any residual stenosis, dissection, or occlusion.  There was much better perfusion to the tibial vessels.  At this point, I felt no further intervention was warranted.  The sheath and wire were removed and a minx device was used to seal off the left common femoral artery.  Direct pressure was held for an additional 10 to 15 minutes to help ensure hemostasis.  The patient was given 20 mg of protamine intravenously.  Sterile dressings were applied. The patient tolerated the procedure well. Sponge and needle counts were correct. The patient was then awakened in the operating room and taken to the recovery room in good condition.    Gil Pineda, DO  Date: 4/25/2025 Time: 13:47 CDT    CC:Nathan Zimmer MD

## 2025-04-25 NOTE — PLAN OF CARE
Goal Outcome Evaluation:  Plan of Care Reviewed With: patient      Progress: no change     Pt A&O x4. C/o pain this shift; see MAR. Voiding per urinal. Right permacath intact. NPO since midnight- plans for procedure later today. Rm air. VSS. Safety maintained.

## 2025-04-25 NOTE — ANESTHESIA PREPROCEDURE EVALUATION
Anesthesia Evaluation     Patient summary reviewed   no history of anesthetic complications:   NPO Solid Status: > 8 hours             Airway   Mallampati: II  Small opening  Dental    (+) edentulous    Pulmonary    (+) a smoker Former, COPD,sleep apnea  (-) asthma  Cardiovascular     (+) hypertension, valvular problems/murmurs murmur and TI, CAD, CABG, CHF , PVD, hyperlipidemia,  carotid artery disease right carotid  (-) past MI, angina    ROS comment: Echo:  ·  Left ventricular systolic function is normal. Left ventricular ejection fraction appears to be 56 - 60%.  ·  Left ventricular wall thickness is consistent with mild septal asymmetric hypertrophy.  ·  Left ventricular diastolic function is consistent with (grade II w/high LAP) pseudonormalization.  ·  Normal right ventricular cavity size and systolic function noted.  ·  The left atrial cavity is mild to moderately dilated.  ·  The right atrial cavity is dilated.  ·  Mild aortic valve stenosis is present.  ·  Mild to moderate mitral valve regurgitation is present.  ·  Moderate to severe tricuspid valve regurgitation is present.  ·  Estimated right ventricular systolic pressure from tricuspid regurgitation is markedly elevated (>55 mmHg).      Neuro/Psych- negative ROS  (-) seizures, TIA, CVA  GI/Hepatic/Renal/Endo    (+) GERD, renal disease- CRI, ESRD and dialysis, thyroid problem hypothyroidism  (-) liver disease, diabetes    Musculoskeletal     Abdominal    Substance History      OB/GYN          Other      history of cancer                  Anesthesia Plan    ASA 4     MAC     (Patient dialyzed this am, needs bmp prior to procedure.    Patient voices that he desires to be a full code.  )  intravenous induction     Anesthetic plan, risks, benefits, and alternatives have been provided, discussed and informed consent has been obtained with: patient.    CODE STATUS:    Code Status (Patient has no pulse and is not breathing): No CPR (Do Not Attempt to  Resuscitate)  Medical Interventions (Patient has pulse or is breathing): Limited Support  Medical Intervention Limits: No cardioversion   No intubation (DNI)  Level Of Support Discussed With: Patient

## 2025-04-26 ENCOUNTER — READMISSION MANAGEMENT (OUTPATIENT)
Dept: CALL CENTER | Facility: HOSPITAL | Age: 75
End: 2025-04-26
Payer: MEDICARE

## 2025-04-26 VITALS
WEIGHT: 132.6 LBS | HEART RATE: 51 BPM | TEMPERATURE: 98.3 F | OXYGEN SATURATION: 96 % | RESPIRATION RATE: 18 BRPM | DIASTOLIC BLOOD PRESSURE: 40 MMHG | BODY MASS INDEX: 17.96 KG/M2 | HEIGHT: 72 IN | SYSTOLIC BLOOD PRESSURE: 162 MMHG

## 2025-04-26 PROCEDURE — 25010000002 CEFAZOLIN PER 500 MG: Performed by: SURGERY

## 2025-04-26 RX ADMIN — CETIRIZINE HYDROCHLORIDE 10 MG: 10 TABLET, FILM COATED ORAL at 09:03

## 2025-04-26 RX ADMIN — ACETAMINOPHEN 1000 MG: 500 TABLET, FILM COATED ORAL at 05:10

## 2025-04-26 RX ADMIN — TRAMADOL HYDROCHLORIDE 50 MG: 50 TABLET, COATED ORAL at 03:01

## 2025-04-26 RX ADMIN — SODIUM BICARBONATE 650 MG: 650 TABLET ORAL at 05:10

## 2025-04-26 RX ADMIN — CLOPIDOGREL BISULFATE 75 MG: 75 TABLET, FILM COATED ORAL at 09:03

## 2025-04-26 RX ADMIN — CEFAZOLIN 2000 MG: 2 INJECTION, POWDER, FOR SOLUTION INTRAMUSCULAR; INTRAVENOUS at 03:01

## 2025-04-26 RX ADMIN — ASPIRIN 81 MG: 81 TABLET, COATED ORAL at 09:01

## 2025-04-26 RX ADMIN — TRAMADOL HYDROCHLORIDE 50 MG: 50 TABLET, COATED ORAL at 09:05

## 2025-04-26 RX ADMIN — NIFEDIPINE 120 MG: 60 TABLET, FILM COATED, EXTENDED RELEASE ORAL at 09:03

## 2025-04-26 RX ADMIN — ATORVASTATIN CALCIUM 10 MG: 10 TABLET, FILM COATED ORAL at 09:02

## 2025-04-26 RX ADMIN — FUROSEMIDE 40 MG: 40 TABLET ORAL at 09:01

## 2025-04-26 RX ADMIN — HYDRALAZINE HYDROCHLORIDE 100 MG: 50 TABLET ORAL at 09:01

## 2025-04-26 RX ADMIN — PANTOPRAZOLE SODIUM 40 MG: 40 TABLET, DELAYED RELEASE ORAL at 05:10

## 2025-04-26 RX ADMIN — CALCITRIOL CAPSULES 0.25 MCG 0.5 MCG: 0.25 CAPSULE ORAL at 09:01

## 2025-04-26 RX ADMIN — ISOSORBIDE DINITRATE 10 MG: 10 TABLET ORAL at 09:03

## 2025-04-26 RX ADMIN — LEVOTHYROXINE SODIUM 100 MCG: 100 TABLET ORAL at 05:10

## 2025-04-26 RX ADMIN — MESALAMINE 1.5 G: 375 CAPSULE, EXTENDED RELEASE ORAL at 09:01

## 2025-04-26 NOTE — DISCHARGE SUMMARY
[facility]  DISCHARGE SUMMARY       Date of Admission: 4/20/2025  Date of Discharge:  4/26/2025  Primary Care Physician: Nathan Zimmer MD    Presenting Problem/History of Present Illness:  Severe right lower extremity pain    Final Discharge Diagnoses:  Active Hospital Problems    Diagnosis     **PAD (peripheral artery disease)     Anemia, multifactorial to include chronic kidney disease, iron deficiency and possible bleed     Acute pain of lower extremity, right     Hyperkalemia     CKD (chronic kidney disease) stage 5     Resistant hypertension        Consults: Vascular surgery, nephrology    Procedures Performed:   1.  Introduction of catheter/sheath into the aorta  2.  Aortoiliac angiogram with right lower extremity runoff  3.  Balloon angioplasty of bilateral  external iliac arteries using a 5 x 40 mm EverCross balloon  4.  Contralateral cannulation of the right common iliac artery  5.  Antegrade Crossing of a long SFA chronic total occlusion  6.  Predilatation/angioplasty of the entire superficial femoral artery using a 2.5 x 40 mm Tammy cross balloon and a 4 x 60 mm EverCross balloon  7.  Intravascular lithotripsy (IVL) of the entire SFA using a 5 x 80 mm Shockwave balloon  8.  Balloon angioplasty of the entire superficial femoral artery using a 5 x 250 mm Medtronic drug-coated balloon  9.  Completion right lower extremity angiogram with radiographic supervision and interpretation  10.  Mynx closure of the left common femoral artery    Pertinent Test Results:   Results for orders placed during the hospital encounter of 01/10/25    Adult Transthoracic Echo Complete w/ Color, Spectral and Contrast if Necessary Per Protocol    Interpretation Summary    Left ventricular systolic function is normal. Left ventricular ejection fraction appears to be 56 - 60%.    Left ventricular wall thickness is consistent with mild septal asymmetric hypertrophy.    Left  ventricular diastolic function is consistent with (grade II w/high LAP) pseudonormalization.    Normal right ventricular cavity size and systolic function noted.    The left atrial cavity is mild to moderately dilated.    The right atrial cavity is dilated.    Mild aortic valve stenosis is present.    Mild to moderate mitral valve regurgitation is present.    Moderate to severe tricuspid valve regurgitation is present.    Estimated right ventricular systolic pressure from tricuspid regurgitation is markedly elevated (>55 mmHg).      Imaging Results (All)       Procedure Component Value Units Date/Time    FL C Arm During Surgery [[id number]] Resulted: 04/25/25 1351     Updated: 04/25/25 1351    Narrative:      This procedure was auto-finalized with no dictation required.    IR Angiogram Extremity [[id number]] Resulted: 04/25/25 1218     Updated: 04/25/25 1351    CT Angio Abdominal Aorta Bilateral Iliofem Runoff [[id number]] Collected: 04/21/25 1621     Updated: 04/21/25 1636    Narrative:      EXAM/TECHNIQUE: CT angiography with 3D MIP images abdomen and pelvis  with bilateral lower extremity runoff     INDICATION: aortoiliac disease right lower extremity ischemia;  N18.9-Chronic kidney disease, unspecified; E87.5-Hyperkalemia;  D64.9-Anemia, unspecified; M54.16-Radiculopathy, lumbar region;  I73.9-Peripheral vascular disease, unspecified     COMPARISON: 9/15/2020     DLP: 479.06 mGy.cm. Automated exposure control was utilized to decrease  patient radiation dose.     FINDINGS:     Nonaneurysmal abdominal aorta with heavy atherosclerotic calcification.  Celiac artery SMA, and MIKI are widely patent. Mild atherosclerotic  narrowing of the left main renal artery. There are 2 renal arteries  bilaterally.     The right common iliac and internal iliac arteries are widely patent.  Focal moderate to severe atherosclerotic stenosis of the right external  iliac artery on series 4 image 155. The right common femoral artery  is  widely patent and contains heavy atherosclerotic calcification. Right  profunda is widely patent. Heavy atherosclerotic calcification  throughout the right SFA with multifocal areas of moderate stenosis.  Patent atherosclerotic popliteal artery. Three-vessel right lower  extremity runoff.     The left common iliac and internal iliac artery are widely patent.  Moderate atherosclerotic stenosis of the left external iliac artery on  series 4 image 159. Left common femoral artery is widely patent. Left  profunda is widely patent. Heavy atherosclerotic calcification  throughout the SFA with multifocal areas of proximal stenosis. The left  SFA appears occluded along its mid course (series 4 image 297) with  reconstitution occurring at the distal vessel. After reconstitution,  there is an area of severe left SFA stenosis on series 4 image 359.  Patent left popliteal artery. Three-vessel left lower extremity runoff  appears maintained.     Nonvascular findings:     Moderate right pleural effusion with overlying atelectasis. Trace left  pleural effusion. Cardiomegaly. Liver appears cirrhotic. 9 mm arterial  enhancing lesion in the left liver on image 54. No gallstone or biliary  ductal dilatation. The pancreas, spleen, and adrenal glands are  unremarkable. Multifocal bilateral renal cortical scarring. No  hydronephrosis or large calculi. No focal urinary bladder wall  thickening.      Moderate volume stool throughout the colon. No colonic wall thickening  or pericolonic fat stranding. No small bowel distention or inflammation.     No evidence of ascites. There is diffuse edematous change throughout the  mesentery/peritoneum. No pelvic mass or collection. Prostate is enlarged  measuring 5.3 cm transverse dimension. No pathologically enlarged  abdominal or pelvic lymph nodes.     Diffuse abdominal wall soft tissue edema. Right hip arthroplasty. Left  femoral head avascular necrosis with areas of subchondral  collapse  noted.       Impression:         1.  Nonaneurysmal abdominal aorta with heavy atherosclerotic  calcification throughout.     2.  Focal moderate-severe atherosclerotic stenosis of the right external  iliac artery. Heavy atherosclerotic calcification throughout the right  SFA with multifocal areas of moderate stenosis. Three-vessel right lower  extremity runoff.     3.  Moderate atherosclerotic stenosis of the left external iliac artery.  Heavy atherosclerotic calcification throughout the left SFA with  multifocal moderate stenosis proximally and complete occlusion of the  mid vessel with reconstitution distally. After reconstitution, there is  an area of focal severe left SFA stenosis. Three-vessel left lower  extremity runoff appears maintained.     4.  Moderate right pleural effusion. Cardiomegaly.     5.  Liver appears cirrhotic. 9 mm enhancing lesion in the left liver on  image 54 series 4. Recommend follow-up liver protocol CT or MRI for  further characterization.     6.  Anasarca with diffuse abdominal wall soft tissue edema and extensive  diffuse edematous change throughout the mesentery and peritoneum.     7.  Advanced left femoral head avascular necrosis.           This report was signed and finalized on 4/21/2025 4:33 PM by Dr. Wellington Farley MD.       US Arterial Doppler Lower Extremity Complete [[id number]] Collected: 04/21/25 1419     Updated: 04/21/25 1425    Narrative:      History: PAD     Comments: Grayscale imaging as well as color flow duplex were used to  evaluate bilateral lower extremity arterial systems.     On the right, the peak systolic velocity in the common femoral artery  measured 31.7 cm/s. In the profunda femoris artery measured 50.3 cm/s.  The proximal SFA appears occluded. The mid SFA measured 20.1 cm/s. In  the distal SFA measured 48.6 cm/s. In the popliteal artery measured 25.3  cm/s. In the posterior tibial artery measured 20.7 cm/s. In the anterior  tibial artery  measured 35.1 cm/s.     On the left, the peak systolic velocity in the common femoral artery  measured 103.2 cm/s. In the profunda femoris artery measured 123.3 cm/s.  In the proximal SFA measured 26.5 cm/s. The mid SFA appears occluded. In  the distal SFA measured 27.3 cm/s. In the popliteal artery measured 16.3  cm/s. In the posterior tibial artery measured 55.8 cm/s. In the anterior  tibial artery measured 42.5 cm/s.       Impression:      In the right lower extremity, there is heavy plaque burden  in the common femoral artery with an occluded proximal SFA. There is  reconstitution in the mid SFA but dampened flow throughout the lower  extremity with heavy plaque burden throughout. In the left lower  extremity, there is also heavy plaque burden in the common femoral  artery with an occluded mid SFA. There is reconstitution in the distal  SFA with dampened flow throughout the lower extremity with heavy plaque  burden throughout. Further imaging/evaluation may be warranted.     This report was signed and finalized on 4/21/2025 2:22 PM by Dr. Gil Pineda MD.        Renal Bilateral [[id number]] Collected: 04/20/25 1717     Updated: 04/20/25 1722    Narrative:      RENAL ULTRASOUND COMPLETE 4/20/2025 3:47 PM     REASON FOR EXAM: Renal Failure; N18.9-Chronic kidney disease,  unspecified; E87.5-Hyperkalemia; D64.9-Anemia, unspecified;  M54.16-Radiculopathy, lumbar region; I73.9-Peripheral vascular disease,  unspecified.     COMPARISON: None.       TECHNIQUE: Multiple longitudinal and transverse realtime sonographic  images of the kidneys and urinary bladder are obtained.     FINDINGS:     RIGHT KIDNEY: The right kidney measures 9.7 cm in length. The cortical  thickness is normal. There is increased cortical echogenicity. There is  a mid to lower pole cyst measuring 1.7 x 1.4 x 1.6 cm. There is no  hydronephrosis. No nephrolithiasis or abnormal perinephric fluid  collections.     LEFT KIDNEY: The left kidney  measures 10.1 cm in length. The cortical  thickness is normal. There is increased cortical echogenicity. There is  a 1 x 0.7 x 0.9 cm cyst in the lower pole. There is no hydronephrosis.  No nephrolithiasis or abnormal perinephric fluid collections.     PELVIS: There is mild thickening of the bladder wall. There is no free  fluid in the pelvis.     ADDITIONAL FINDINGS: The abdominal aorta is normal caliber. There is  atheromatous calcification.       Impression:      1. Increased cortical echogenicity of both kidneys consistent with  medical renal disease. No hydronephrosis.  2. Bilateral renal cysts.  3. Mild wall thickening of the urinary bladder which may be related to  muscular hypertrophy. Acute and chronic inflammation and infection are  also included in the differential. Neoplasm less likely.  4. Atheromatous abdominal aorta.           The images and report are stored on PAC's per institutional and state  guidelines.           This report was signed and finalized on 4/20/2025 5:19 PM by Dr. Mikael Gallegos MD.       XR Chest 1 View [[id number]] Collected: 04/20/25 1155     Updated: 04/20/25 1159    Narrative:      XR CHEST 1 VW- 4/20/2025 10:40 AM     HISTORY: Preop; N18.9-Chronic kidney disease, unspecified;  E87.5-Hyperkalemia; D64.9-Anemia, unspecified; M54.16-Radiculopathy,  lumbar region; I73.9-Peripheral vascular disease, unspecified       COMPARISON: Chest x-ray dated 4/13/2025     FINDINGS:  Upright frontal radiograph of the chest was obtained     Bilateral interstitial coarsening with subpleural lines reflecting  interstitial edema. Trace left-sided and small to moderate sided right  pleural effusions. No consolidation or pneumothorax. Prior coronary  bypass. Right IJ dual-lumen central venous catheter. No acute bony  abnormality.       Impression:      1.  Volume overload with interstitial edema and bilateral pleural  effusions as detailed above.     This report was signed and finalized on 4/20/2025  11:56 AM by Dr Tyrell Knox.       CT Lumbar Spine Without Contrast [[id number]] Collected: 04/20/25 0947     Updated: 04/20/25 0953    Narrative:      CT LUMBAR SPINE WO CONTRAST- 4/20/2025 8:24 AM     HISTORY: Right-sided lumbar radiculopathy     COMPARISON: None      DOSE LENGTH PRODUCT: 439.68 mGy.cm. Automated exposure control was also  utilized to decrease patient radiation dose.     TECHNIQUE: Serial helical tomographic images of the lumbar spine were  obtained without the use of intravenous contrast. Additionally, sagittal  and coronal reformatted images were provided for review. Automated  exposure control is utilized to reduce patient radiation dose.     FINDINGS:     Counting will assume 5 lumbar-type vertebrae. The spine is imaged from  T12 to S3.     There is no visualized acute fracture or traumatic subluxation. Lumbar  lordosis is maintained. Vertebral body heights are well maintained.  Multilevel loss of intervertebral disc height, L5-S1 in particular. No  high-grade bony narrowing of the spinal canal. Advanced facet  arthropathy. Posterior elements are intact. Neuroforaminal narrowing at  multiple levels, especially L5-S1 on the right. There is a layering  right-sided pleural effusion. Atheromatous vascular calcification along  the abdominal aorta. Incidentally noted sacral canal Tarlov cysts.  Included portion of the bony pelvis is intact.       Impression:      1. No acute osseous injury or malalignment.   2. Underlying advanced lumbar spine osteoarthritis changes including a  high-grade right-sided neuroforaminal narrowing at L5-S1.  3. Layering right-sided pleural effusion.           This report was signed and finalized on 4/20/2025 9:50 AM by Dr Tyrell Knox.             LAB RESULTS:      Lab 04/24/25  0504 04/23/25  0619 04/22/25  0245 04/21/25  0850 04/20/25  2348 04/20/25  1732 04/20/25  0849   WBC 4.20 6.06 4.42 4.58  --   --  5.29   HEMOGLOBIN 9.9* 9.6* 8.2* 8.4* 8.6*   < > 6.6*    HEMATOCRIT 31.5* 30.9* 27.1* 27.6* 27.5*   < > 22.0*   PLATELETS 100* 124* 97* 96*  --   --  82*   NEUTROS ABS  --  3.23 2.80 3.06  --   --  4.02   IMMATURE GRANS (ABS)  --  0.03 0.02 0.04  --   --   --    LYMPHS ABS  --  1.49 0.82 0.93  --   --   --    MONOS ABS  --  0.76 0.56 0.45  --   --   --    EOS ABS  --  0.45* 0.19 0.06  --   --  0.26   .9* 100.7* 103.8* 104.5*  --   --  108.4*   CRP  --   --   --   --   --   --  2.00*    < > = values in this interval not displayed.         Lab 04/25/25  1132 04/24/25  0504 04/23/25  0619 04/22/25  0245 04/21/25  0850 04/20/25  1732 04/20/25  1400   SODIUM 138 135* 136 138 137  137   < >  --    POTASSIUM 3.7 4.2 4.1 4.4 4.9  4.9   < >  --    CHLORIDE 100 99 99 101 105  105   < >  --    CO2 28.0 25.0 27.0 25.0 17.0*  17.0*   < >  --    ANION GAP 10.0 11.0 10.0 12.0 15.0  15.0   < >  --    BUN 20 36* 44* 55* 99*  99*   < >  --    CREATININE 1.59* 2.87* 2.80* 3.50* 4.35*  4.35*   < >  --    EGFR 44.4* 21.9* 22.5* 17.2* 13.3*  13.3*   < >  --    GLUCOSE 84 89 100* 93 107*  107*   < >  --    CALCIUM 8.4* 8.5* 8.3* 8.3* 8.5*  8.5*   < >  --    MAGNESIUM  --   --   --   --   --   --  2.2   PHOSPHORUS  --   --   --   --   --   --  7.2*    < > = values in this interval not displayed.         Lab 04/21/25  0850 04/20/25  1400   TOTAL PROTEIN 6.3 6.7   ALBUMIN 3.3* 3.7   GLOBULIN 3.0  --    ALT (SGPT) 10 10   AST (SGOT) 14 17   BILIRUBIN 0.2 0.2   INDIRECT BILIRUBIN  --  0.1   BILIRUBIN DIRECT  --  0.1   ALK PHOS 145* 176*                 Lab 04/24/25  0504 04/20/25  1400 04/20/25  0946 04/20/25  0849 04/20/25  0849   IRON  --  45*  --   --  53*   IRON SATURATION (TSAT)  --  14*  --   --  18*   TIBC  --  313  --   --  301   TRANSFERRIN  --  210  --   --  202   FERRITIN  --   --   --   --  347.60   ABO TYPING O  --  O   < >  --    RH TYPING Negative  --  Negative   < >  --    ANTIBODY SCREEN Negative  --  Negative  --   --     < > = values in this interval not  displayed.         Brief Urine Lab Results  (Last result in the past 365 days)        Color   Clarity   Blood   Leuk Est   Nitrite   Protein   CREAT   Urine HCG        04/20/25 1509             22.8               Microbiology Results (last 10 days)       Procedure Component Value - Date/Time    Eosinophil Smear - Urine, Urine, Clean Catch [[id number]]  (Normal) Collected: 04/20/25 1509    Lab Status: Final result Specimen: Urine, Clean Catch Updated: 04/21/25 0020     Eosinophil Smear 0 % EOS/100 Cells             Hospital Course:     -Chronic kidney disease stage V/end-stage renal disease  Patient failed conservative management with IV fluid and was subsequently initiated to dialysis, case management has set up outpatient dialysis chair.  He will be discharged to continue dialysis as per schedule [TTS] and follow-up with nephrologist postdischarge.     -Acute on chronic anemia multifactorial  Chronic kidney disease is contributing to patient's anemia, he was transfused 1 unit of PRBC during this admission.  He will follow-up with PCP to continue monitoring of serial hemoglobin levels and transfused as needed during dialysis.     -Acute pain right lower extremity;   She is status post angiogram today by vascular surgery and reported as follows-  PROCEDURE PERFORMED:   1.  Introduction of catheter/sheath into the aorta  2.  Aortoiliac angiogram with right lower extremity runoff  3.  Balloon angioplasty of bilateral  external iliac arteries using a 5 x 40 mm EverCross balloon  4.  Contralateral cannulation of the right common iliac artery  5.  Antegrade Crossing of a long SFA chronic total occlusion  6.  Predilatation/angioplasty of the entire superficial femoral artery using a 2.5 x 40 mm Tammy cross balloon and a 4 x 60 mm EverCross balloon  7.  Intravascular lithotripsy (IVL) of the entire SFA using a 5 x 80 mm Shockwave balloon  8.  Balloon angioplasty of the entire superficial femoral artery using a 5 x 250 mm  "Medtronic drug-coated balloon  9.  Completion right lower extremity angiogram with radiographic supervision and interpretation  10.  Mynx closure of the left common femoral artery  He will follow-up with vascular surgery postdischarge as recommended    Other chronic medical conditions-  Anxiety disorder  Carotid stenosis  Chronic rhinitis  COPD  Coronary artery disease status post CABG  Crohn's disease  GERD  Hypertension  Peripheral artery disease  Pernicious anemia  BPH  Sensorineural hearing loss  Tinnitus  V. tach      Physical Exam on Discharge:  /40 (BP Location: Right arm, Patient Position: Sitting)   Pulse 51   Temp 98.3 °F (36.8 °C) (Oral)   Resp 18   Ht 182.9 cm (72\")   Wt 60.1 kg (132 lb 9.6 oz)   SpO2 96%   BMI 17.98 kg/m²   Physical Exam  Constitutional:       Appearance: He is ill-appearing (Chronic).   HENT:      Head: Normocephalic and atraumatic.      Mouth/Throat:      Mouth: Mucous membranes are moist.      Pharynx: Oropharynx is clear.   Eyes:      Extraocular Movements: Extraocular movements intact.      Conjunctiva/sclera: Conjunctivae normal.   Cardiovascular:      Rate and Rhythm: Normal rate and regular rhythm.      Heart sounds: Murmur heard.   Pulmonary:      Effort: Pulmonary effort is normal.      Breath sounds: Normal breath sounds.   Abdominal:      General: There is no distension.      Palpations: Abdomen is soft.   Musculoskeletal:      Cervical back: Normal range of motion and neck supple.      Right lower leg: No edema.      Left lower leg: No edema.   Skin:     General: Skin is warm and dry.      Comments: Bilateral lower extremities dusky, Multiple bruises scattered.  Oozing blood noted at right permacath site-did not remove dressing to evaluate.  Nurses have been changing frequently   Neurological:      General: No focal deficit present.      Mental Status: He is alert and oriented to person, place, and time.   Psychiatric:         Mood and Affect: Mood normal.    "      Behavior: Behavior normal.     Condition on Discharge: Stable.    Discharge Disposition:  Home or Self Care    Discharge Medications:     Discharge Medications        Changes to Medications        Instructions Start Date   melatonin 5 MG tablet tablet  What changed: additional instructions   10 mg, Oral, Nightly             Continue These Medications        Instructions Start Date   albuterol sulfate  (90 Base) MCG/ACT inhaler  Commonly known as: PROVENTIL HFA;VENTOLIN HFA;PROAIR HFA   2 puffs, Every 6 Hours PRN      ALPRAZolam 0.25 MG tablet  Commonly known as: XANAX   0.25 mg, Nightly      aspirin 81 MG EC tablet   81 mg, Daily      atorvastatin 10 MG tablet  Commonly known as: LIPITOR   10 mg, Oral, Daily      calcitriol 0.5 MCG capsule  Commonly known as: ROCALTROL   0.5 mcg, Oral, Daily      carvedilol 25 MG tablet  Commonly known as: COREG   25 mg, Oral, 2 Times Daily With Meals      cholestyramine light 4 g packet   4 g, Oral, Daily      cloNIDine 0.2 MG/24HR patch  Commonly known as: CATAPRES-TTS   1 patch, Weekly      cloNIDine 0.3 MG tablet  Commonly known as: CATAPRES   0.3 mg, Oral, 3 Times Daily      clopidogrel 75 MG tablet  Commonly known as: PLAVIX   75 mg, Daily      Copper Caps 2 MG capsule  Generic drug: Copper Gluconate   1 capsule, Daily      docusate sodium 100 MG capsule  Commonly known as: COLACE   100 mg, 3 Times Daily PRN      fexofenadine 180 MG tablet  Commonly known as: ALLEGRA   180 mg, Daily      fluticasone 50 MCG/ACT nasal spray  Commonly known as: FLONASE   2 sprays, Daily      furosemide 40 MG tablet  Commonly known as: LASIX   40 mg, Oral, Daily      furosemide 20 MG tablet  Commonly known as: Lasix   10 mg, Oral, Daily      hydrALAZINE 100 MG tablet  Commonly known as: APRESOLINE   100 mg, Oral, 3 Times Daily, 100mg daily      isosorbide dinitrate 10 MG tablet  Commonly known as: ISORDIL   10 mg, Oral, 3 Times Daily (Nitrates)      levothyroxine 100 MCG  tablet  Commonly known as: Synthroid   100 mcg, Oral, Every Early Morning      magnesium chloride ER 64 MG DR tablet   64 mg, Oral, Daily      mesalamine 0.375 g 24 hr capsule  Commonly known as: APRISO   1.5 g, Oral, Daily      montelukast 10 MG tablet  Commonly known as: SINGULAIR   10 mg, Nightly      NIFEdipine CC 60 MG 24 hr tablet  Commonly known as: ADALAT CC   120 mg, Oral, Daily      NON FORMULARY   25 mg, Oral, Nightly PRN, Zzzquill       omeprazole 20 MG capsule  Commonly known as: priLOSEC   20 mg, Daily      PreserVision/Lutein capsule   1 capsule, 2 times daily      PROCRIT IJ   1 dose, Injection, Weekly, Monday       sodium bicarbonate 650 MG tablet   650 mg, Oral, 5 Times Daily      spironolactone 25 MG tablet  Commonly known as: ALDACTONE   25 mg, Daily      tamsulosin 0.4 MG capsule 24 hr capsule  Commonly known as: FLOMAX   1 capsule, Nightly      traMADol 50 MG tablet  Commonly known as: ULTRAM   50 mg, Oral, Every 6 Hours PRN      valsartan 320 MG tablet  Commonly known as: DIOVAN   320 mg, Oral, Daily      vitamin D 1.25 MG (49337 UT) capsule capsule  Commonly known as: ERGOCALCIFEROL   50,000 Units, Weekly               This patient has current or prior documentation of an left ventricular ejection fraction (LVEF) of less than or equal to 40%.    The patient was prescribed or already taking an ACE/ARB.    The patient was prescribed already taking bisoprolol, carvedilol, or sustained release metoprolol succinate.     Discharge Diet: Renal diet    Activity at Discharge: As tolerated    Follow-up Appointments:   Future Appointments   Date Time Provider Department Center   4/28/2025  8:35 AM  [facility] CANCER CTR LAB  [facility] CCLAB [facility]   4/28/2025  9:00 AM CHAIR 20  PAD OP INFU ONC  PAD OIONC [facility]   5/5/2025  8:30 AM  [facility] CANCER CTR LAB [facility] CCLAB [facility]   5/5/2025  9:00 AM CHAIR 11  [facility]   5/12/2025  8:30 AM [facility] CANCER CTR LAB  PAD CCLAB [facility]   5/12/2025  9:00 AM  CHAIR 13  PAD OP INFU ONC  PAD OIONC [facility]   5/15/2025  8:30 AM Becky Camacho APRN [facility] ONC PAD PAD   5/19/2025  8:35 AM  [facility] CANCER CTR LAB  [facility] CCLAB [facility]   5/19/2025  9:00 AM CHAIR 12  PAD OP INFU ONC  PAD OIONC PAD   6/3/2025  8:30 AM Nathan Zimmer MD [facility] PC VSQ PAD   7/3/2025  2:00 PM Adrien Brewster MD [facility] GE PAD PAD   8/27/2025 10:15 AM Rip Mcguire DO [facility] CD PAD PAD   11/4/2025 10:00 AM PAD US NIVAS CART 1  PAD NIVAS PAD   11/4/2025 11:00 AM PAD US NIVAS CART 1  PAD NIVAS PAD   11/6/2025 10:15 AM Elena Jon APRN [facility] VS PAD PAD       Test Results Pending at Discharge: None    Electronically signed by Subhash Duran MD, 04/26/25, 12:09 CDT.    Time: 40 minutes.

## 2025-04-26 NOTE — PLAN OF CARE
Goal Outcome Evaluation:  Plan of Care Reviewed With: patient      Progress: no change     Pt A&O x4. C/o right foot pain; see MAR. Safe guard device to left groin remains- all air removed from device; small dried drainage noted. Neuro's intact. IV abx per order. Voiding per urinal. Rm air. VSS. Safety maintained.

## 2025-04-26 NOTE — OUTREACH NOTE
Prep Survey      Flowsheet Row Responses   [facility] patient discharged from? [place]   Is LACE score < 7 ? No   Eligibility [facility]   Date of Admission 04/20/25   Date of Discharge 04/26/25   Discharge Disposition Home or Self Care   Discharge diagnosis PAD (peripheral artery disease)   Does the patient have one of the following disease processes/diagnoses(primary or secondary)? General Surgery   Does the patient have Home health ordered? No   Is there a DME ordered? No   Medication alerts for this patient see AVS   Prep survey completed? Yes            Alisha EVANGELISTA - Registered Nurse              Alisha EVANGELISTA - Registered Nurse

## 2025-04-28 ENCOUNTER — TRANSITIONAL CARE MANAGEMENT TELEPHONE ENCOUNTER (OUTPATIENT)
Dept: CALL CENTER | Facility: HOSPITAL | Age: 75
End: 2025-04-28
Payer: MEDICARE

## 2025-04-28 ENCOUNTER — LAB (OUTPATIENT)
Dept: LAB | Facility: HOSPITAL | Age: 75
End: 2025-04-28
Payer: MEDICARE

## 2025-04-28 ENCOUNTER — HOSPITAL ENCOUNTER (OUTPATIENT)
Facility: HOSPITAL | Age: 75
Setting detail: OBSERVATION
Discharge: HOME | End: 2025-04-29
Attending: EMERGENCY MEDICINE | Admitting: INTERNAL MEDICINE
Payer: MEDICARE

## 2025-04-28 ENCOUNTER — INFUSION (OUTPATIENT)
Dept: ONCOLOGY | Facility: HOSPITAL | Age: 75
End: 2025-04-28
Payer: MEDICARE

## 2025-04-28 VITALS
SYSTOLIC BLOOD PRESSURE: 133 MMHG | RESPIRATION RATE: 18 BRPM | TEMPERATURE: 97.2 F | WEIGHT: 148.6 LBS | BODY MASS INDEX: 20.13 KG/M2 | HEIGHT: 72 IN | DIASTOLIC BLOOD PRESSURE: 42 MMHG | HEART RATE: 50 BPM | OXYGEN SATURATION: 97 %

## 2025-04-28 DIAGNOSIS — N18.9 CHRONIC KIDNEY DISEASE, UNSPECIFIED CKD STAGE: Primary | ICD-10-CM

## 2025-04-28 DIAGNOSIS — D63.1 ANEMIA DUE TO STAGE 4 CHRONIC KIDNEY DISEASE: ICD-10-CM

## 2025-04-28 DIAGNOSIS — Z99.2 CHRONIC KIDNEY DISEASE REQUIRING CHRONIC DIALYSIS: ICD-10-CM

## 2025-04-28 DIAGNOSIS — D64.9 ANEMIA, UNSPECIFIED TYPE: ICD-10-CM

## 2025-04-28 DIAGNOSIS — N18.4 ANEMIA DUE TO STAGE 4 CHRONIC KIDNEY DISEASE: ICD-10-CM

## 2025-04-28 DIAGNOSIS — N18.4 CKD (CHRONIC KIDNEY DISEASE) STAGE 4, GFR 15-29 ML/MIN: ICD-10-CM

## 2025-04-28 DIAGNOSIS — N18.6 CHRONIC KIDNEY DISEASE REQUIRING CHRONIC DIALYSIS: ICD-10-CM

## 2025-04-28 DIAGNOSIS — M79.605 PAIN OF LEFT LOWER EXTREMITY: ICD-10-CM

## 2025-04-28 DIAGNOSIS — N18.4 ANEMIA DUE TO STAGE 4 CHRONIC KIDNEY DISEASE: Primary | ICD-10-CM

## 2025-04-28 DIAGNOSIS — D63.1 ANEMIA DUE TO STAGE 4 CHRONIC KIDNEY DISEASE: Primary | ICD-10-CM

## 2025-04-28 DIAGNOSIS — D64.9 SYMPTOMATIC ANEMIA: Primary | ICD-10-CM

## 2025-04-28 LAB
ABO GROUP BLD: NORMAL
ALBUMIN SERPL-MCNC: 3.6 G/DL (ref 3.5–5.2)
ALBUMIN/GLOB SERPL: 1.4 G/DL
ALP SERPL-CCNC: 133 U/L (ref 39–117)
ALT SERPL W P-5'-P-CCNC: <5 U/L (ref 1–41)
ANION GAP SERPL CALCULATED.3IONS-SCNC: 15 MMOL/L (ref 5–15)
ANION GAP SERPL CALCULATED.3IONS-SCNC: 16 MMOL/L (ref 5–15)
AST SERPL-CCNC: 17 U/L (ref 1–40)
BASOPHILS # BLD AUTO: 0.05 10*3/MM3 (ref 0–0.2)
BASOPHILS # BLD AUTO: 0.07 10*3/MM3 (ref 0–0.2)
BASOPHILS NFR BLD AUTO: 0.9 % (ref 0–1.5)
BASOPHILS NFR BLD AUTO: 1.1 % (ref 0–1.5)
BILIRUB SERPL-MCNC: 0.2 MG/DL (ref 0–1.2)
BLD GP AB SCN SERPL QL: NEGATIVE
BUN SERPL-MCNC: 61 MG/DL (ref 8–23)
BUN SERPL-MCNC: 64 MG/DL (ref 8–23)
BUN/CREAT SERPL: 14.3 (ref 7–25)
BUN/CREAT SERPL: 14.6 (ref 7–25)
CALCIUM SPEC-SCNC: 8.7 MG/DL (ref 8.6–10.5)
CALCIUM SPEC-SCNC: 8.8 MG/DL (ref 8.6–10.5)
CHLORIDE SERPL-SCNC: 98 MMOL/L (ref 98–107)
CHLORIDE SERPL-SCNC: 98 MMOL/L (ref 98–107)
CO2 SERPL-SCNC: 22 MMOL/L (ref 22–29)
CO2 SERPL-SCNC: 22 MMOL/L (ref 22–29)
CREAT SERPL-MCNC: 4.26 MG/DL (ref 0.76–1.27)
CREAT SERPL-MCNC: 4.39 MG/DL (ref 0.76–1.27)
DEPRECATED RDW RBC AUTO: 66 FL (ref 37–54)
DEPRECATED RDW RBC AUTO: 67.5 FL (ref 37–54)
EGFRCR SERPLBLD CKD-EPI 2021: 13.1 ML/MIN/1.73
EGFRCR SERPLBLD CKD-EPI 2021: 13.6 ML/MIN/1.73
EOSINOPHIL # BLD AUTO: 0.37 10*3/MM3 (ref 0–0.4)
EOSINOPHIL # BLD AUTO: 0.43 10*3/MM3 (ref 0–0.4)
EOSINOPHIL NFR BLD AUTO: 6.7 % (ref 0.3–6.2)
EOSINOPHIL NFR BLD AUTO: 6.8 % (ref 0.3–6.2)
ERYTHROCYTE [DISTWIDTH] IN BLOOD BY AUTOMATED COUNT: 17.7 % (ref 12.3–15.4)
ERYTHROCYTE [DISTWIDTH] IN BLOOD BY AUTOMATED COUNT: 17.7 % (ref 12.3–15.4)
FERRITIN SERPL-MCNC: 640.1 NG/ML (ref 30–400)
GLOBULIN UR ELPH-MCNC: 2.5 GM/DL
GLUCOSE SERPL-MCNC: 102 MG/DL (ref 65–99)
GLUCOSE SERPL-MCNC: 116 MG/DL (ref 65–99)
HCT VFR BLD AUTO: 22.7 % (ref 37.5–51)
HCT VFR BLD AUTO: 23.6 % (ref 37.5–51)
HGB BLD-MCNC: 7.1 G/DL (ref 13–17.7)
HGB BLD-MCNC: 7.1 G/DL (ref 13–17.7)
IMM GRANULOCYTES # BLD AUTO: 0.05 10*3/MM3 (ref 0–0.05)
IMM GRANULOCYTES # BLD AUTO: 0.06 10*3/MM3 (ref 0–0.05)
IMM GRANULOCYTES NFR BLD AUTO: 0.8 % (ref 0–0.5)
IMM GRANULOCYTES NFR BLD AUTO: 1.1 % (ref 0–0.5)
IRON 24H UR-MRATE: 80 MCG/DL (ref 59–158)
IRON SATN MFR SERPL: 30 % (ref 20–50)
LYMPHOCYTES # BLD AUTO: 0.79 10*3/MM3 (ref 0.7–3.1)
LYMPHOCYTES # BLD AUTO: 0.93 10*3/MM3 (ref 0.7–3.1)
LYMPHOCYTES NFR BLD AUTO: 14.4 % (ref 19.6–45.3)
LYMPHOCYTES NFR BLD AUTO: 14.5 % (ref 19.6–45.3)
MCH RBC QN AUTO: 31 PG (ref 26.6–33)
MCH RBC QN AUTO: 31.8 PG (ref 26.6–33)
MCHC RBC AUTO-ENTMCNC: 30.1 G/DL (ref 31.5–35.7)
MCHC RBC AUTO-ENTMCNC: 31.3 G/DL (ref 31.5–35.7)
MCV RBC AUTO: 101.8 FL (ref 79–97)
MCV RBC AUTO: 103.1 FL (ref 79–97)
MONOCYTES # BLD AUTO: 0.61 10*3/MM3 (ref 0.1–0.9)
MONOCYTES # BLD AUTO: 0.68 10*3/MM3 (ref 0.1–0.9)
MONOCYTES NFR BLD AUTO: 10.5 % (ref 5–12)
MONOCYTES NFR BLD AUTO: 11.2 % (ref 5–12)
NEUTROPHILS NFR BLD AUTO: 3.58 10*3/MM3 (ref 1.7–7)
NEUTROPHILS NFR BLD AUTO: 4.29 10*3/MM3 (ref 1.7–7)
NEUTROPHILS NFR BLD AUTO: 65.5 % (ref 42.7–76)
NEUTROPHILS NFR BLD AUTO: 66.5 % (ref 42.7–76)
NRBC BLD AUTO-RTO: 0 /100 WBC (ref 0–0.2)
NRBC BLD AUTO-RTO: 0 /100 WBC (ref 0–0.2)
PLATELET # BLD AUTO: 93 10*3/MM3 (ref 140–450)
PLATELET # BLD AUTO: 99 10*3/MM3 (ref 140–450)
PMV BLD AUTO: 10.3 FL (ref 6–12)
PMV BLD AUTO: 10.4 FL (ref 6–12)
POTASSIUM SERPL-SCNC: 4.8 MMOL/L (ref 3.5–5.2)
POTASSIUM SERPL-SCNC: 4.8 MMOL/L (ref 3.5–5.2)
PROT SERPL-MCNC: 6.1 G/DL (ref 6–8.5)
RBC # BLD AUTO: 2.23 10*6/MM3 (ref 4.14–5.8)
RBC # BLD AUTO: 2.29 10*6/MM3 (ref 4.14–5.8)
RH BLD: NEGATIVE
SODIUM SERPL-SCNC: 135 MMOL/L (ref 136–145)
SODIUM SERPL-SCNC: 136 MMOL/L (ref 136–145)
T&S EXPIRATION DATE: NORMAL
TIBC SERPL-MCNC: 265 MCG/DL (ref 298–536)
TRANSFERRIN SERPL-MCNC: 178 MG/DL (ref 200–360)
WBC NRBC COR # BLD AUTO: 5.46 10*3/MM3 (ref 3.4–10.8)
WBC NRBC COR # BLD AUTO: 6.45 10*3/MM3 (ref 3.4–10.8)

## 2025-04-28 PROCEDURE — 36415 COLL VENOUS BLD VENIPUNCTURE: CPT

## 2025-04-28 PROCEDURE — 86901 BLOOD TYPING SEROLOGIC RH(D): CPT | Performed by: INTERNAL MEDICINE

## 2025-04-28 PROCEDURE — G0463 HOSPITAL OUTPT CLINIC VISIT: HCPCS

## 2025-04-28 PROCEDURE — 25010000002 HEPARIN (PORCINE) PER 1000 UNITS: Performed by: INTERNAL MEDICINE

## 2025-04-28 PROCEDURE — 83540 ASSAY OF IRON: CPT

## 2025-04-28 PROCEDURE — P9040 RBC LEUKOREDUCED IRRADIATED: HCPCS

## 2025-04-28 PROCEDURE — G0378 HOSPITAL OBSERVATION PER HR: HCPCS

## 2025-04-28 PROCEDURE — 86900 BLOOD TYPING SEROLOGIC ABO: CPT | Performed by: INTERNAL MEDICINE

## 2025-04-28 PROCEDURE — P9016 RBC LEUKOCYTES REDUCED: HCPCS

## 2025-04-28 PROCEDURE — 86850 RBC ANTIBODY SCREEN: CPT | Performed by: INTERNAL MEDICINE

## 2025-04-28 PROCEDURE — 84466 ASSAY OF TRANSFERRIN: CPT

## 2025-04-28 PROCEDURE — 85025 COMPLETE CBC W/AUTO DIFF WBC: CPT | Performed by: EMERGENCY MEDICINE

## 2025-04-28 PROCEDURE — G0257 UNSCHED DIALYSIS ESRD PT HOS: HCPCS

## 2025-04-28 PROCEDURE — 80053 COMPREHEN METABOLIC PANEL: CPT

## 2025-04-28 PROCEDURE — 86923 COMPATIBILITY TEST ELECTRIC: CPT

## 2025-04-28 PROCEDURE — 25010000002 EPOETIN ALFA-EPBX 10000 UNIT/ML SOLUTION: Performed by: INTERNAL MEDICINE

## 2025-04-28 PROCEDURE — 99285 EMERGENCY DEPT VISIT HI MDM: CPT

## 2025-04-28 PROCEDURE — 96372 THER/PROPH/DIAG INJ SC/IM: CPT

## 2025-04-28 PROCEDURE — 85025 COMPLETE CBC W/AUTO DIFF WBC: CPT

## 2025-04-28 PROCEDURE — 82728 ASSAY OF FERRITIN: CPT

## 2025-04-28 PROCEDURE — 86900 BLOOD TYPING SEROLOGIC ABO: CPT

## 2025-04-28 RX ORDER — BISACODYL 10 MG
10 SUPPOSITORY, RECTAL RECTAL DAILY PRN
Status: DISCONTINUED | OUTPATIENT
Start: 2025-04-28 | End: 2025-04-29 | Stop reason: HOSPADM

## 2025-04-28 RX ORDER — TRAMADOL HYDROCHLORIDE 50 MG/1
50 TABLET ORAL EVERY 6 HOURS PRN
Status: DISCONTINUED | OUTPATIENT
Start: 2025-04-28 | End: 2025-04-29 | Stop reason: HOSPADM

## 2025-04-28 RX ORDER — CALCITRIOL 0.25 UG/1
0.5 CAPSULE, LIQUID FILLED ORAL DAILY
Status: DISCONTINUED | OUTPATIENT
Start: 2025-04-28 | End: 2025-04-29 | Stop reason: HOSPADM

## 2025-04-28 RX ORDER — DOCUSATE SODIUM 100 MG/1
100 CAPSULE, LIQUID FILLED ORAL 3 TIMES DAILY PRN
Status: DISCONTINUED | OUTPATIENT
Start: 2025-04-28 | End: 2025-04-29 | Stop reason: HOSPADM

## 2025-04-28 RX ORDER — FLUTICASONE PROPIONATE 50 MCG
2 SPRAY, SUSPENSION (ML) NASAL DAILY
Status: DISCONTINUED | OUTPATIENT
Start: 2025-04-28 | End: 2025-04-29 | Stop reason: HOSPADM

## 2025-04-28 RX ORDER — TAMSULOSIN HYDROCHLORIDE 0.4 MG/1
0.4 CAPSULE ORAL NIGHTLY
Status: DISCONTINUED | OUTPATIENT
Start: 2025-04-28 | End: 2025-04-29 | Stop reason: HOSPADM

## 2025-04-28 RX ORDER — CLONIDINE HYDROCHLORIDE 0.1 MG/1
0.3 TABLET ORAL EVERY 8 HOURS SCHEDULED
Status: DISCONTINUED | OUTPATIENT
Start: 2025-04-28 | End: 2025-04-29 | Stop reason: HOSPADM

## 2025-04-28 RX ORDER — CARVEDILOL 6.25 MG/1
25 TABLET ORAL 2 TIMES DAILY WITH MEALS
Status: DISCONTINUED | OUTPATIENT
Start: 2025-04-28 | End: 2025-04-29 | Stop reason: HOSPADM

## 2025-04-28 RX ORDER — BISACODYL 5 MG/1
5 TABLET, DELAYED RELEASE ORAL DAILY PRN
Status: DISCONTINUED | OUTPATIENT
Start: 2025-04-28 | End: 2025-04-29 | Stop reason: HOSPADM

## 2025-04-28 RX ORDER — SODIUM CHLORIDE 0.9 % (FLUSH) 0.9 %
10 SYRINGE (ML) INJECTION AS NEEDED
Status: DISCONTINUED | OUTPATIENT
Start: 2025-04-28 | End: 2025-04-29 | Stop reason: HOSPADM

## 2025-04-28 RX ORDER — ASPIRIN 81 MG/1
81 TABLET ORAL DAILY
Status: DISCONTINUED | OUTPATIENT
Start: 2025-04-28 | End: 2025-04-29 | Stop reason: HOSPADM

## 2025-04-28 RX ORDER — CLOPIDOGREL BISULFATE 75 MG/1
75 TABLET ORAL DAILY
Status: DISCONTINUED | OUTPATIENT
Start: 2025-04-28 | End: 2025-04-29 | Stop reason: HOSPADM

## 2025-04-28 RX ORDER — BUDESONIDE, GLYCOPYRROLATE, AND FORMOTEROL FUMARATE 160; 9; 4.8 UG/1; UG/1; UG/1
2 AEROSOL, METERED RESPIRATORY (INHALATION) 2 TIMES DAILY PRN
COMMUNITY

## 2025-04-28 RX ORDER — LEVOTHYROXINE SODIUM 100 UG/1
100 TABLET ORAL
Status: DISCONTINUED | OUTPATIENT
Start: 2025-04-29 | End: 2025-04-29 | Stop reason: HOSPADM

## 2025-04-28 RX ORDER — PANTOPRAZOLE SODIUM 40 MG/1
40 TABLET, DELAYED RELEASE ORAL
Status: DISCONTINUED | OUTPATIENT
Start: 2025-04-29 | End: 2025-04-29 | Stop reason: HOSPADM

## 2025-04-28 RX ORDER — NIFEDIPINE 60 MG/1
120 TABLET, EXTENDED RELEASE ORAL DAILY
Status: DISCONTINUED | OUTPATIENT
Start: 2025-04-28 | End: 2025-04-29 | Stop reason: HOSPADM

## 2025-04-28 RX ORDER — ISOSORBIDE DINITRATE 10 MG/1
10 TABLET ORAL
Status: DISCONTINUED | OUTPATIENT
Start: 2025-04-28 | End: 2025-04-29 | Stop reason: HOSPADM

## 2025-04-28 RX ORDER — ALBUTEROL SULFATE 0.83 MG/ML
2.5 SOLUTION RESPIRATORY (INHALATION) EVERY 6 HOURS PRN
Status: DISCONTINUED | OUTPATIENT
Start: 2025-04-28 | End: 2025-04-29 | Stop reason: HOSPADM

## 2025-04-28 RX ORDER — FUROSEMIDE 40 MG/1
40 TABLET ORAL DAILY
Status: DISCONTINUED | OUTPATIENT
Start: 2025-04-28 | End: 2025-04-29 | Stop reason: HOSPADM

## 2025-04-28 RX ORDER — HEPARIN SODIUM 1000 [USP'U]/ML
3200 INJECTION, SOLUTION INTRAVENOUS; SUBCUTANEOUS AS NEEDED
Status: DISCONTINUED | OUTPATIENT
Start: 2025-04-28 | End: 2025-04-29 | Stop reason: HOSPADM

## 2025-04-28 RX ORDER — SODIUM CHLORIDE 0.9 % (FLUSH) 0.9 %
10 SYRINGE (ML) INJECTION EVERY 12 HOURS SCHEDULED
Status: DISCONTINUED | OUTPATIENT
Start: 2025-04-28 | End: 2025-04-29 | Stop reason: HOSPADM

## 2025-04-28 RX ORDER — CLONIDINE HYDROCHLORIDE 0.3 MG/1
0.3 TABLET ORAL 3 TIMES DAILY
COMMUNITY

## 2025-04-28 RX ORDER — HYDRALAZINE HYDROCHLORIDE 50 MG/1
100 TABLET, FILM COATED ORAL 3 TIMES DAILY
Status: DISCONTINUED | OUTPATIENT
Start: 2025-04-28 | End: 2025-04-29 | Stop reason: HOSPADM

## 2025-04-28 RX ORDER — AMOXICILLIN 250 MG
2 CAPSULE ORAL 2 TIMES DAILY PRN
Status: DISCONTINUED | OUTPATIENT
Start: 2025-04-28 | End: 2025-04-29 | Stop reason: HOSPADM

## 2025-04-28 RX ORDER — POLYETHYLENE GLYCOL 3350 17 G/17G
17 POWDER, FOR SOLUTION ORAL DAILY PRN
Status: DISCONTINUED | OUTPATIENT
Start: 2025-04-28 | End: 2025-04-29 | Stop reason: HOSPADM

## 2025-04-28 RX ORDER — CLONIDINE 0.2 MG/24H
1 PATCH, EXTENDED RELEASE TRANSDERMAL WEEKLY
Status: DISCONTINUED | OUTPATIENT
Start: 2025-05-01 | End: 2025-04-29 | Stop reason: HOSPADM

## 2025-04-28 RX ORDER — ALBUTEROL SULFATE 90 UG/1
2 INHALANT RESPIRATORY (INHALATION) EVERY 6 HOURS PRN
Status: DISCONTINUED | OUTPATIENT
Start: 2025-04-28 | End: 2025-04-28 | Stop reason: CLARIF

## 2025-04-28 RX ORDER — ALPRAZOLAM 0.25 MG
0.25 TABLET ORAL NIGHTLY
Status: DISCONTINUED | OUTPATIENT
Start: 2025-04-28 | End: 2025-04-29 | Stop reason: HOSPADM

## 2025-04-28 RX ORDER — SPIRONOLACTONE 25 MG/1
25 TABLET ORAL DAILY
Status: DISCONTINUED | OUTPATIENT
Start: 2025-04-28 | End: 2025-04-29 | Stop reason: HOSPADM

## 2025-04-28 RX ORDER — CETIRIZINE HYDROCHLORIDE 10 MG/1
10 TABLET ORAL DAILY
Status: DISCONTINUED | OUTPATIENT
Start: 2025-04-28 | End: 2025-04-29 | Stop reason: HOSPADM

## 2025-04-28 RX ORDER — MONTELUKAST SODIUM 10 MG/1
10 TABLET ORAL NIGHTLY
Status: DISCONTINUED | OUTPATIENT
Start: 2025-04-28 | End: 2025-04-29 | Stop reason: HOSPADM

## 2025-04-28 RX ORDER — SODIUM BICARBONATE 650 MG/1
650 TABLET ORAL
Status: DISCONTINUED | OUTPATIENT
Start: 2025-04-28 | End: 2025-04-29 | Stop reason: HOSPADM

## 2025-04-28 RX ORDER — DESOXIMETASONE 2.5 MG/G
1 CREAM TOPICAL 2 TIMES DAILY PRN
COMMUNITY

## 2025-04-28 RX ORDER — VALSARTAN 80 MG/1
320 TABLET ORAL DAILY
Status: DISCONTINUED | OUTPATIENT
Start: 2025-04-28 | End: 2025-04-29 | Stop reason: HOSPADM

## 2025-04-28 RX ORDER — CHOLECALCIFEROL (VITAMIN D3) 25 MCG
5000 TABLET ORAL DAILY
Status: DISCONTINUED | OUTPATIENT
Start: 2025-04-28 | End: 2025-04-29 | Stop reason: HOSPADM

## 2025-04-28 RX ORDER — ATORVASTATIN CALCIUM 10 MG/1
10 TABLET, FILM COATED ORAL DAILY
Status: DISCONTINUED | OUTPATIENT
Start: 2025-04-28 | End: 2025-04-29 | Stop reason: HOSPADM

## 2025-04-28 RX ORDER — SODIUM CHLORIDE 9 MG/ML
40 INJECTION, SOLUTION INTRAVENOUS AS NEEDED
Status: DISCONTINUED | OUTPATIENT
Start: 2025-04-28 | End: 2025-04-29 | Stop reason: HOSPADM

## 2025-04-28 RX ORDER — MESALAMINE 0.38 G/1
1.5 CAPSULE, EXTENDED RELEASE ORAL DAILY
Status: DISCONTINUED | OUTPATIENT
Start: 2025-04-28 | End: 2025-04-29 | Stop reason: HOSPADM

## 2025-04-28 RX ORDER — NIFEDIPINE 60 MG/1
60 TABLET, EXTENDED RELEASE ORAL DAILY
COMMUNITY

## 2025-04-28 RX ORDER — CLONIDINE HYDROCHLORIDE 0.3 MG/1
0.3 TABLET ORAL 3 TIMES DAILY
Qty: 270 TABLET | Refills: 3 | Status: ON HOLD | OUTPATIENT
Start: 2025-04-28 | End: 2025-04-28

## 2025-04-28 RX ADMIN — TRAMADOL HYDROCHLORIDE 50 MG: 50 TABLET, COATED ORAL at 18:02

## 2025-04-28 RX ADMIN — SODIUM BICARBONATE 650 MG: 650 TABLET ORAL at 21:16

## 2025-04-28 RX ADMIN — EPOETIN ALFA-EPBX 10000 UNITS: 10000 INJECTION, SOLUTION INTRAVENOUS; SUBCUTANEOUS at 19:07

## 2025-04-28 RX ADMIN — Medication 10 MG: at 21:16

## 2025-04-28 RX ADMIN — SODIUM BICARBONATE 650 MG: 650 TABLET ORAL at 19:07

## 2025-04-28 RX ADMIN — HYDRALAZINE HYDROCHLORIDE 100 MG: 50 TABLET ORAL at 19:07

## 2025-04-28 RX ADMIN — Medication 10 ML: at 21:36

## 2025-04-28 RX ADMIN — HEPARIN SODIUM 3200 UNITS: 1000 INJECTION, SOLUTION INTRAVENOUS; SUBCUTANEOUS at 18:28

## 2025-04-28 RX ADMIN — ISOSORBIDE DINITRATE 10 MG: 10 TABLET ORAL at 19:07

## 2025-04-28 RX ADMIN — EMPAGLIFLOZIN 10 MG: 10 TABLET, FILM COATED ORAL at 19:07

## 2025-04-28 RX ADMIN — TAMSULOSIN HYDROCHLORIDE 0.4 MG: 0.4 CAPSULE ORAL at 21:16

## 2025-04-28 RX ADMIN — ALPRAZOLAM 0.25 MG: 0.25 TABLET ORAL at 21:16

## 2025-04-28 RX ADMIN — CLONIDINE HYDROCHLORIDE 0.3 MG: 0.1 TABLET ORAL at 21:16

## 2025-04-28 RX ADMIN — MONTELUKAST SODIUM 10 MG: 10 TABLET, COATED ORAL at 21:16

## 2025-04-28 NOTE — ED PROVIDER NOTES
Subjective   History of Present Illness  This gentleman is 75 years old who came to the ED from hematology office because hemoglobin was low.  He is also having some left leg pain.  On arrival to the ED on examination it seems that the vascular balloon occlusion device is still on his left femoral area.  And is inflated   He does not have obvious evidence of arterial ischemia i.e. pallor to his lower extremity.  But the vascular device was deflated and removed immediately.  There is no pulsatile hematomas.  He has got a femoral pulse.  And dopplerable posterior tibial pulses.  His extremity is warm to touch at this time.  And does not any obvious gangrene.  I have informed Dr. Pineda about this.  The patient was supposed to get dialysis today.    Foot Pain  Location:  Left foot pain and cold extremity.  Currently has perfusion and appears warm.  Severity:  Moderate  Timing:  Constant  Progression:  Resolved  Associated symptoms: fatigue and myalgias    Associated symptoms: no abdominal pain, no chest pain, no cough, no fever, no headaches, no loss of consciousness, no nausea, no shortness of breath and no vomiting    Cold Extremity  Location:  Patient had a complaint of a cold extremities but extremities are warm to touch.  Associated symptoms: fatigue and myalgias    Associated symptoms: no abdominal pain, no chest pain, no cough, no fever, no headaches, no loss of consciousness, no nausea, no shortness of breath and no vomiting        Review of Systems   Constitutional:  Positive for fatigue. Negative for chills and fever.   HENT: Negative.     Respiratory: Negative.  Negative for cough, chest tightness, shortness of breath and stridor.    Cardiovascular: Negative.  Negative for chest pain.   Gastrointestinal: Negative.  Negative for abdominal distention, abdominal pain, nausea and vomiting.   Endocrine: Negative.    Genitourinary: Negative.  Negative for difficulty urinating and flank pain.   Musculoskeletal:   Positive for myalgias.   Skin: Negative.  Negative for color change.   Neurological: Negative.  Negative for dizziness, loss of consciousness and headaches.   All other systems reviewed and are negative.      Past Medical History:   Diagnosis Date    3-vessel CAD 08/11/2020    Allergic rhinitis     Anemia     Anxiety disorder 04/27/2020    Arthritis     Asymmetrical sensorineural hearing loss 06/28/2017    Atherosclerosis of native artery of both lower extremities with intermittent claudication 07/18/2019    Avascular necrosis of femoral head, left 07/11/2020    right hip after surgery    Carotid stenosis     Chronic mucoid otitis media     Chronic rhinitis     COPD (chronic obstructive pulmonary disease)     Coronary artery disease     HEART BYPASS 2004    Crohn's disease of large intestine with other complication 07/30/2020    Chronic diarrhea Colonoscopy July 2020 revealed mild patchy scattered hemosiderin staining with inflammation more so in rectosigmoid area.  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    Iron deficiency anemia 07/14/2020    Mixed hearing loss of left ear     PAD (peripheral artery disease) 08/11/2020    Perianal abscess     Pernicious anemia 08/17/2020    took shots but never diagnosed with b12 deficiency    Personal history of alcoholism 08/11/2020    quit drinking in 2013    Prostatic hypertrophy 08/11/2020    Sensorineural hearing loss     Sepsis with acute renal failure 09/15/2020    Shortness of breath 05/27/2021    Tinnitus     Ventricular tachycardia, nonsustained 07/14/2020    Weight loss 07/11/2020       Allergies   Allergen  Reactions    Ondansetron Anaphylaxis and GI Intolerance    Zofran [Ondansetron Hcl] Anaphylaxis    Lortab [Hydrocodone-Acetaminophen] Other (See Comments) and Hallucinations     CLOSTROPHOBIC    Allopurinol Other (See Comments)     Pain on right side       Past Surgical History:   Procedure Laterality Date    AORTAGRAM Right 4/25/2025    Procedure: RIGHT LOWER EXTREMITY ANGIOGRAM, SHOCKWAVE LITHOTRIPSY, BALLOON ANGIOPLASTY, MYNX CLOSURE;  Surgeon: Gil Pineda DO;  Location: [facility] HYBRID OR;  Service: Vascular;  Laterality: Right;    ARTERY SURGERY  2021    right side on neck    CAROTID ENDARTERECTOMY Right 05/10/2021    Procedure: RIGHT CAROTID ENDARTERECTOMY WITH EEG;  Surgeon: Gil Pineda DO;  Location: [facility] HYBRID OR 12;  Service: Vascular;  Laterality: Right;    COLONOSCOPY N/A 07/02/2020    Procedure: COLONOSCOPY WITH ANESTHESIA;  Surgeon: Adrien Brewster MD;  Location: [facility] ENDOSCOPY;  Service: Gastroenterology;  Laterality: N/A;  pre op: diarrhea  post op: polyps  PCP: Jeo Velasco MD    COLONOSCOPY N/A 10/13/2020    Procedure: COLONOSCOPY WITH ANESTHESIA;  Surgeon: Adrien Brewster MD;  Location: [facility] ENDOSCOPY;  Service: Gastroenterology;  Laterality: N/A;  Pre: Chronic Diarrhea, Crohn's  Post: AVM  Dr. Neftali Velasco  CO2 Inflation Used    COLONOSCOPY N/A 12/08/2023    Procedure: COLONOSCOPY WITH ANESTHESIA;  Surgeon: Adrien Brewster MD;  Location: [facility] ENDOSCOPY;  Service: Gastroenterology;  Laterality: N/A;  pre chrone's disease  post sub optimal prep, polyp, chrone's      CORONARY ARTERY BYPASS GRAFT  2003    x3    ENDOSCOPY N/A 11/02/2021    Procedure: ESOPHAGOGASTRODUODENOSCOPY WITH ANESTHESIA;  Surgeon: Bridger Bell MD;  Location: [facility] ENDOSCOPY;  Service: Gastroenterology;  Laterality: N/A;  pre anemia;gi bleed  post  gi bleed;fabian ring  Dr. ERIC Velasco    ENDOSCOPY N/A 10/10/2023    Procedure: ESOPHAGOGASTRODUODENOSCOPY WITH ANESTHESIA;   Surgeon: Adrien Brewster MD;  Location: [facility] ENDOSCOPY;  Service: Gastroenterology;  Laterality: N/A;  preop; anemia  postop esophagitis ; R/O barretts   PCP Randall Beata    EYE SURGERY Bilateral     catorac    INCISION AND DRAINAGE PERIRECTAL ABSCESS N/A 2017    Procedure: INCISION AND DRAINAGE OF JEET ANAL ABSCESS;  Surgeon: Lynette Smith MD;  Location:  [facility] OR;  Service:     INGUINAL HERNIA REPAIR Bilateral 2023    Procedure: INGUINAL HERNIA BILATERAL REPAIR LAPAROSCOPIC WITH DAVINCI ROBOT WITH MESH;  Surgeon: Tahira Rivera MD;  Location:  [facility] OR;  Service: Robotics - DaVinci;  Laterality: Bilateral;    INSERTION HEMODIALYSIS CATHETER N/A 2025    Procedure: HEMODIALYSIS CATHETER PLACEMENT;  Surgeon: Gil Pineda DO;  Location: [facility] HYBRID OR;  Service: Vascular;  Laterality: N/A;    MYRINGOTOMY W/ TUBES Left 2017    06/10/2016    TONSILLECTOMY      TOTAL HIP ARTHROPLASTY Right        Family History   Problem Relation Age of Onset    Breast cancer Mother     Dementia Father     Glaucoma Father     No Known Problems Daughter     Colon polyps Neg Hx     Colon cancer Neg Hx        Social History     Socioeconomic History    Marital status:    Tobacco Use    Smoking status: Former     Current packs/day: 0.00     Average packs/day: 0.5 packs/day for 25.8 years (12.9 ttl pk-yrs)     Types: Cigarettes     Start date:      Quit date: 10/13/2013     Years since quittin.5     Passive exposure: Past    Smokeless tobacco: Never    Tobacco comments:     quit    Vaping Use    Vaping status: Former    Substances: Nicotine    Devices: Pre-filled or refillable cartridge   Substance and Sexual Activity    Alcohol use: Not Currently    Drug use: No    Sexual activity: Not Currently     Partners: Female           Objective   Physical Exam  Vitals and nursing note reviewed. Exam conducted with a chaperone present.   Constitutional:       Appearance: He is  well-developed and underweight.      Comments: Chronic ill-appearing   HENT:      Head: Normocephalic and atraumatic.   Eyes:      General: Lids are normal.      Conjunctiva/sclera: Conjunctivae normal.      Pupils: Pupils are equal, round, and reactive to light.   Cardiovascular:      Rate and Rhythm: Normal rate and regular rhythm.      Chest Wall: PMI is not displaced.      Pulses: Normal pulses.      Heart sounds: Murmur heard.      Systolic murmur is present.      No S3 sounds.   Pulmonary:      Effort: Pulmonary effort is normal.      Breath sounds: Normal breath sounds. No decreased breath sounds.      Comments: Has a vascular access device for dialysis.  Abdominal:      General: Abdomen is flat. Bowel sounds are normal.      Palpations: Abdomen is soft.      Tenderness: There is no abdominal tenderness.   Musculoskeletal:         General: Normal range of motion.      Cervical back: Full passive range of motion without pain, normal range of motion and neck supple.      Comments: Patient has got a balloon occlusion device on the left femoral area.  With inflated balloon the balloon was deflated and the device was removed.  Distal pulses not palpable but dopplerable.  The patient does not any evidence of gangrene the foot does not appear cold at all.  There is no clinical evidence of compartment.  Calf examination negative.   Skin:     General: Skin is warm and dry.      Capillary Refill: Capillary refill takes less than 2 seconds.   Neurological:      General: No focal deficit present.      Mental Status: He is alert and oriented to person, place, and time.      Motor: Motor function is intact.      Deep Tendon Reflexes: Reflexes are normal and symmetric.   Psychiatric:         Behavior: Behavior is cooperative.         Procedures           ED Course                                                       Medical Decision Making  Patient supposed to get dialysis today is in the ED with a low hemoglobin he is got  chronic low hemoglobin which is lower than usual.  He requires dialysis today.  I have been told that the dialysis cannot be performed on the sent to the patient admitted to the medicine service.  Therefore the patient will be admitted to get his dialysis and probably get a blood transfusion.  Dr. Pineda has been informed about the patient's status.    Problems Addressed:  Anemia, unspecified type: complicated acute illness or injury  Chronic kidney disease requiring chronic dialysis: complicated acute illness or injury  Chronic kidney disease, unspecified CKD stage: complicated acute illness or injury  Pain of left lower extremity: complicated acute illness or injury    Amount and/or Complexity of Data Reviewed  Labs: ordered.    Risk  Decision regarding hospitalization.        Final diagnoses:   Chronic kidney disease, unspecified CKD stage   Chronic kidney disease requiring chronic dialysis   Pain of left lower extremity   Anemia, unspecified type       ED Disposition  ED Disposition       ED Disposition   Decision to Admit    Condition   --    Comment   Level of Care: Telemetry [5]   Diagnosis: Anemia [194706]   Admitting Physician: DWAYNE ALEX [[id number]]   Attending Physician: DWAYNE ALEX [[id number]]                 No follow-up provider specified.       Medication List      No changes were made to your prescriptions during this visit.            Matias Kelley MD  04/28/25 1130

## 2025-04-28 NOTE — H&P
[facility] Medicine Services  HISTORY AND PHYSICAL    Date of Admission: 4/28/2025  Primary Care Physician: Nathan Zimmer MD    Subjective   Primary Historian: Patient    Chief Complaint: Right leg pain    History of Present Illness  Ricardo Hugo is a 75-year-old male with a past medical history of anxiety, anemia of chronic disease, carotid stenosis, COPD, CABG, Crohn's disease, end-stage renal disease on HD, iron deficiency anemia, hypertension, significant PVD with recent surgical intervention 4/25/2025 see below.  Patient presented to [facility] emergency department 4/28/2025, after he was seen for outpatient infusion at the cancer center.  While getting patient ready for treatment he complained of right leg pain and once nursing staff evaluated his lack of pulses and cold foot along with his labs, they sent him over to the emergency department to be evaluated.  Patient was recently discharged on 4/26/2025 after bilateral angioplasty of the iliac arteries and right femoral artery lithotripsy and balloon angioplasty.  Patient stated that his right leg began aching slightly after he was first discharged coming more more intense and then this morning when he woke up he could not feel his foot and had significant numbness and tingling up his leg.    Assessment by ED physician patient apparently had a arterial occlusive device with balloon inflated still on his femoral artery, which should have been removed prior to discharge.  Patient states that he was not told to remove it he was just discharged home with that and to follow-up with Dr. Pineda.  Occlusive vise was immediately removed, within approximately 30 minutes patient's pulse was back, able to be dopplered which is his baseline, patient states the pain immediately resolved and he had normal or baseline feeling in that right leg.  Case was discussed with his surgeon Dr. Pineda by phone who stated that as long as  pulse was back and no pain he could be consulted if needed or any new needs arise.    Additionally patient has missed his daily HD treatment , will request nephrology consult for dialysis treatment today.  For evaluation of patient's labs, Hb 7.1 which is slightly below his baseline at approximately 8.4-8.8.  Will initiate 1 unit PRBC and Procrit and continue to follow closely.    Additional ED workup , BUN 64, CR 4.39, GFR 13.1, , alkaline phosphatase 133, Hb 7.1, HCT 22.7, PLT 93, lymphocytes 14.5%.    Review of Systems   Constitutional:  Positive for chills and fatigue.   Neurological:  Positive for weakness.      Otherwise complete ROS reviewed and negative except as mentioned in the HPI.    Past Medical History:   Past Medical History:   Diagnosis Date    3-vessel CAD 08/11/2020    Allergic rhinitis     Anemia     Anxiety disorder 04/27/2020    Arthritis     Asymmetrical sensorineural hearing loss 06/28/2017    Atherosclerosis of native artery of both lower extremities with intermittent claudication 07/18/2019    Avascular necrosis of femoral head, left 07/11/2020    right hip after surgery    Carotid stenosis     Chronic mucoid otitis media     Chronic rhinitis     COPD (chronic obstructive pulmonary disease)     Coronary artery disease     HEART BYPASS 2004    Crohn's disease of large intestine with other complication 07/30/2020    Chronic diarrhea Colonoscopy July 2020 revealed mild patchy scattered hemosiderin staining with inflammation more so in rectosigmoid area.  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    Iron deficiency anemia  07/14/2020    Mixed hearing loss of left ear     PAD (peripheral artery disease) 08/11/2020    Perianal abscess     Pernicious anemia 08/17/2020    took shots but never diagnosed with b12 deficiency    Personal history of alcoholism 08/11/2020    quit drinking in 2013    Prostatic hypertrophy 08/11/2020    Sensorineural hearing loss     Sepsis with acute renal failure 09/15/2020    Shortness of breath 05/27/2021    Tinnitus     Ventricular tachycardia, nonsustained 07/14/2020    Weight loss 07/11/2020     Past Surgical History:  Past Surgical History:   Procedure Laterality Date    AORTAGRAM Right 4/25/2025    Procedure: RIGHT LOWER EXTREMITY ANGIOGRAM, SHOCKWAVE LITHOTRIPSY, BALLOON ANGIOPLASTY, MYNX CLOSURE;  Surgeon: Gil Pineda DO;  Location: [facility] HYBRID OR;  Service: Vascular;  Laterality: Right;    ARTERY SURGERY  2021    right side on neck    CAROTID ENDARTERECTOMY Right 05/10/2021    Procedure: RIGHT CAROTID ENDARTERECTOMY WITH EEG;  Surgeon: Gil Pineda DO;  Location: [facility] HYBRID OR 12;  Service: Vascular;  Laterality: Right;    COLONOSCOPY N/A 07/02/2020    Procedure: COLONOSCOPY WITH ANESTHESIA;  Surgeon: Adrien Brewster MD;  Location: [facility] ENDOSCOPY;  Service: Gastroenterology;  Laterality: N/A;  pre op: diarrhea  post op: polyps  PCP: Joe Velasco MD    COLONOSCOPY N/A 10/13/2020    Procedure: COLONOSCOPY WITH ANESTHESIA;  Surgeon: Adrien Brewster MD;  Location: [facility] ENDOSCOPY;  Service: Gastroenterology;  Laterality: N/A;  Pre: Chronic Diarrhea, Crohn's  Post: AVM  Dr. Neftali Velasco  CO2 Inflation Used    COLONOSCOPY N/A 12/08/2023    Procedure: COLONOSCOPY WITH ANESTHESIA;  Surgeon: Adrien Brewster MD;  Location: [facility] ENDOSCOPY;  Service: Gastroenterology;  Laterality: N/A;  pre chrone's disease  post sub optimal prep, polyp, chrone's      CORONARY ARTERY BYPASS GRAFT  2003    x3    ENDOSCOPY N/A 11/02/2021    Procedure: ESOPHAGOGASTRODUODENOSCOPY WITH  ANESTHESIA;  Surgeon: Bridger Bell MD;  Location: [facility] ENDOSCOPY;  Service: Gastroenterology;  Laterality: N/A;  pre anemia;gi bleed  post  gi bleed;debbieki ring  Dr. ERIC Velasco    ENDOSCOPY N/A 10/10/2023    Procedure: ESOPHAGOGASTRODUODENOSCOPY WITH ANESTHESIA;  Surgeon: Adrien Brewster MD;  Location: [facility] ENDOSCOPY;  Service: Gastroenterology;  Laterality: N/A;  preop; anemia  postop esophagitis ; R/O barretts   PCP Randall Beata    EYE SURGERY Bilateral     catorac    INCISION AND DRAINAGE PERIRECTAL ABSCESS N/A 03/03/2017    Procedure: INCISION AND DRAINAGE OF JEET ANAL ABSCESS;  Surgeon: Lynette Smith MD;  Location: [facility] OR;  Service:     INGUINAL HERNIA REPAIR Bilateral 06/27/2023    Procedure: INGUINAL HERNIA BILATERAL REPAIR LAPAROSCOPIC WITH DAVINCI ROBOT WITH MESH;  Surgeon: Tahira Rivera MD;  Location: [facility] OR;  Service: Robotics - DaVinci;  Laterality: Bilateral;    INSERTION HEMODIALYSIS CATHETER N/A 4/16/2025    Procedure: HEMODIALYSIS CATHETER PLACEMENT;  Surgeon: Gil Pineda DO;  Location: [facility] HYBRID OR;  Service: Vascular;  Laterality: N/A;    MYRINGOTOMY W/ TUBES Left 04/17/2017    06/10/2016    TONSILLECTOMY      TOTAL HIP ARTHROPLASTY Right 2006     Social History:  reports that he quit smoking about 11 years ago. His smoking use included cigarettes. He started smoking about 37 years ago. He has a 12.9 pack-year smoking history. He has been exposed to tobacco smoke. He has never used smokeless tobacco. He reports that he does not currently use alcohol. He reports that he does not use drugs.    Family History: family history includes Breast cancer in his mother; Dementia in his father; Glaucoma in his father; No Known Problems in his daughter.       Allergies:  Allergies   Allergen Reactions    Ondansetron Anaphylaxis and GI Intolerance    Zofran [Ondansetron Hcl] Anaphylaxis    Lortab [Hydrocodone-Acetaminophen] Other (See Comments) and Hallucinations      CLOSTROPHOBIC    Allopurinol Other (See Comments)     Pain on right side       Medications:  Prior to Admission medications    Medication Sig Start Date End Date Taking? Authorizing Provider   albuterol sulfate  (90 Base) MCG/ACT inhaler Inhale 2 puffs Every 6 (Six) Hours As Needed for Wheezing or Shortness of Air.    Twyla Guevara MD   ALPRAZolam (XANAX) 0.25 MG tablet Take 1 tablet by mouth Every Night.    Twyla Guevara MD   aspirin (aspirin) 81 MG EC tablet Take 1 tablet by mouth Daily.    Twyla Guevara MD   atorvastatin (LIPITOR) 10 MG tablet Take 1 tablet by mouth Daily. 9/4/24   Nathan Zimmer MD   calcitriol (ROCALTROL) 0.5 MCG capsule Take 1 capsule by mouth Daily. 1/28/25   Nathan Zimmer MD   carvedilol (COREG) 25 MG tablet Take 1 tablet by mouth 2 (Two) Times a Day With Meals.    Twyla Guevara MD   cholestyramine light 4 g packet Take 1 packet by mouth Daily. 3/12/25   Adrien Brewster MD   cloNIDine (CATAPRES) 0.3 MG tablet Take 1 tablet by mouth 3 (Three) Times a Day. 8/19/24   Nathan Zimmer MD   cloNIDine (CATAPRES-TTS) 0.2 MG/24HR patch Place 1 patch on the skin as directed by provider 1 (One) Time Per Week. THURSDAYS    Twyla Guevara MD   clopidogrel (PLAVIX) 75 MG tablet Take 1 tablet by mouth Daily.    Twyla Guevara MD   Copper Gluconate (Copper Caps) 2 MG capsule Take 2 mg by mouth Daily.    Twyla Guevara MD   docusate sodium (COLACE) 100 MG capsule Take 1 capsule by mouth 3 (Three) Times a Day As Needed for Constipation.    Twyla Guevara MD   Epoetin Anselmo (PROCRIT IJ) Inject 1 dose as directed 1 (One) Time Per Week. Monday    Twyla Guevara MD   fexofenadine (ALLEGRA) 180 MG tablet Take 1 tablet by mouth Daily.    Twyla Guevara MD   fluticasone (FLONASE) 50 MCG/ACT nasal spray Administer 2 sprays into the nostril(s) as directed by provider Daily.    Twyla Guevara MD   furosemide (Lasix)  20 MG tablet Take 0.5 tablets by mouth Daily. 2/25/25   Nathan Zimmer MD   furosemide (LASIX) 40 MG tablet Take 1 tablet by mouth Daily. 1/28/25   Nathan Zimmer MD   hydrALAZINE (APRESOLINE) 100 MG tablet Take 1 tablet by mouth 3 (Three) Times a Day. 100mg daily 8/1/23   Nathan Zimmer MD   isosorbide dinitrate (ISORDIL) 10 MG tablet Take 1 tablet by mouth 3 (Three) Times a Day. 1/28/25   Nathan Zimmer MD   levothyroxine (Synthroid) 100 MCG tablet Take 1 tablet by mouth Every Morning. 3/25/25   Nathan Zimmer MD   magnesium chloride ER 64 MG DR tablet Take 1 tablet by mouth Daily.    Twyla Guevara MD   melatonin 5 MG tablet tablet Take 2 tablets by mouth Every Night.    Twyla Guevara MD   mesalamine (APRISO) 0.375 g 24 hr capsule Take 4 capsules by mouth Daily. 3/12/25   Adrien Brewster MD   montelukast (SINGULAIR) 10 MG tablet Take 1 tablet by mouth Every Night.    Twyla Guevara MD   Multiple Vitamins-Minerals (PRESERVISION/LUTEIN) capsule Take 1 capsule by mouth 2 (two) times a day.    Twyla Guevara MD   NIFEdipine CC (ADALAT CC) 60 MG 24 hr tablet Take 2 tablets by mouth Daily for 270 days. 2/25/25 11/22/25  Nathan Zimmer MD   NON FORMULARY Take 25 mg by mouth At Night As Needed. Zzzquill    Twyla Guevara MD   omeprazole (priLOSEC) 20 MG capsule Take 1 capsule by mouth Daily.    Twyla Guevara MD   sodium bicarbonate 650 MG tablet Take 1 tablet by mouth 5 (Five) Times a Day.    Twyla Guevara MD   spironolactone (ALDACTONE) 25 MG tablet Take 1 tablet by mouth Daily.    Twyla Guevara MD   tamsulosin (FLOMAX) 0.4 MG capsule 24 hr capsule Take 1 capsule by mouth Every Night.    Twyla Guevara MD   traMADol (ULTRAM) 50 MG tablet Take 1 tablet by mouth Every 6 (Six) Hours As Needed for Moderate Pain. 4/16/25   Gil Pineda DO   valsartan (DIOVAN) 320 MG tablet Take 1 tablet by mouth Daily. 5/17/23    "Nathan Zimmer MD   vitamin D (ERGOCALCIFEROL) 1.25 MG (36645 UT) capsule capsule Take 1 capsule by mouth 1 (One) Time Per Week.    Provider, MD Twyla     I have utilized all available immediate resources to obtain, update, or review the patient's current medications (including all prescriptions, over-the-counter products, herbals, cannabis/cannabidiol products, and vitamin/mineral/dietary (nutritional) supplements).    Results for orders placed during the hospital encounter of 01/10/25    Adult Transthoracic Echo Complete w/ Color, Spectral and Contrast if Necessary Per Protocol    Interpretation Summary    Left ventricular systolic function is normal. Left ventricular ejection fraction appears to be 56 - 60%.    Left ventricular wall thickness is consistent with mild septal asymmetric hypertrophy.    Left ventricular diastolic function is consistent with (grade II w/high LAP) pseudonormalization.    Normal right ventricular cavity size and systolic function noted.    The left atrial cavity is mild to moderately dilated.    The right atrial cavity is dilated.    Mild aortic valve stenosis is present.    Mild to moderate mitral valve regurgitation is present.    Moderate to severe tricuspid valve regurgitation is present.    Estimated right ventricular systolic pressure from tricuspid regurgitation is markedly elevated (>55 mmHg).       Objective     Vital Signs: /47   Pulse 52   Temp 97.7 °F (36.5 °C) (Oral)   Resp 19   Ht 182.9 cm (72\")   Wt 66.7 kg (147 lb)   SpO2 99%   BMI 19.94 kg/m²   Physical Exam  Vitals and nursing note reviewed.   Constitutional:       General: He is not in acute distress.     Appearance: Normal appearance. He is ill-appearing. He is not toxic-appearing.   HENT:      Head: Normocephalic and atraumatic.      Right Ear: Tympanic membrane normal.      Left Ear: Tympanic membrane normal.      Nose: Nose normal.      Mouth/Throat:      Mouth: Mucous membranes are moist. "      Pharynx: Oropharynx is clear.   Eyes:      Pupils: Pupils are equal, round, and reactive to light.   Cardiovascular:      Rate and Rhythm: Normal rate and regular rhythm.      Pulses:           Dorsalis pedis pulses are 1+ on the right side and 2+ on the left side.        Posterior tibial pulses are detected w/ Doppler on the right side and 2+ on the left side.   Pulmonary:      Effort: Pulmonary effort is normal. No respiratory distress.      Breath sounds: Normal breath sounds. No wheezing or rales.   Abdominal:      General: Abdomen is flat. Bowel sounds are normal.      Palpations: Abdomen is soft.   Musculoskeletal:      Cervical back: Normal range of motion. No rigidity or tenderness.      Right lower leg: Edema present.      Left lower leg: No edema.   Skin:     General: Skin is warm and dry.      Capillary Refill: Capillary refill takes less than 2 seconds.   Neurological:      General: No focal deficit present.      Mental Status: He is alert and oriented to person, place, and time.   Psychiatric:         Mood and Affect: Mood normal.         Behavior: Behavior normal.         Thought Content: Thought content normal.         Judgment: Judgment normal.        Results Reviewed:  Lab Results (last 24 hours)       Procedure Component Value Units Date/Time    Comprehensive Metabolic Panel [[id number]]  (Abnormal) Collected: 04/28/25 1009    Specimen: Blood from Arm, Right Updated: 04/28/25 1100     Glucose 102 mg/dL      BUN 64 mg/dL      Creatinine 4.39 mg/dL      Sodium 135 mmol/L      Potassium 4.8 mmol/L      Chloride 98 mmol/L      CO2 22.0 mmol/L      Calcium 8.7 mg/dL      Total Protein 6.1 g/dL      Albumin 3.6 g/dL      ALT (SGPT) <5 U/L      AST (SGOT) 17 U/L      Alkaline Phosphatase 133 U/L      Total Bilirubin 0.2 mg/dL      Globulin 2.5 gm/dL      A/G Ratio 1.4 g/dL      BUN/Creatinine Ratio 14.6     Anion Gap 15.0 mmol/L      eGFR 13.1 mL/min/1.73             CBC & Differential [[id number]]   (Abnormal) Collected: 04/28/25 1009    Specimen: Blood from Arm, Right Updated: 04/28/25 1034            CBC Auto Differential [[id number]]  (Abnormal) Collected: 04/28/25 1009    Specimen: Blood from Arm, Right Updated: 04/28/25 1034     WBC 5.46 10*3/mm3      RBC 2.23 10*6/mm3      Hemoglobin 7.1 g/dL      Hematocrit 22.7 %      .8 fL      MCH 31.8 pg      MCHC 31.3 g/dL      RDW 17.7 %      RDW-SD 66.0 fl      MPV 10.4 fL      Platelets 93 10*3/mm3      Neutrophil % 65.5 %      Lymphocyte % 14.5 %      Monocyte % 11.2 %      Eosinophil % 6.8 %      Basophil % 0.9 %      Immature Grans % 1.1 %      Neutrophils, Absolute 3.58 10*3/mm3      Lymphocytes, Absolute 0.79 10*3/mm3      Monocytes, Absolute 0.61 10*3/mm3      Eosinophils, Absolute 0.37 10*3/mm3      Basophils, Absolute 0.05 10*3/mm3      Immature Grans, Absolute 0.06 10*3/mm3      nRBC 0.0 /100 WBC           Imaging Results (Last 24 Hours)       ** No results found for the last 24 hours. **            Assessment / Plan   Assessment:   Active Hospital Problems    Diagnosis     **Anemia, multifactorial to include chronic kidney disease, iron deficiency and possible bleed     ESRD (end stage renal disease) on dialysis     Bradycardia     CLL (chronic lymphocytic leukemia)     Resistant hypertension     PAD (peripheral artery disease)        Treatment Plan  The patient will be admitted to Dr. Camarena's service here at [facility].     1.  Anemia-acute on chronic, upon admission Hb 7.1, HCT 22.7, PLT 93.  Close to patient's baseline however given patient's significant weakness and recent operative procedures will initiate 1 unit PRBC, with a dose of Procrit, will continue to monitor closely repeat CBC with differential in the a.m.    2.  ESRD on HD-nephrology consult for initiation of HD treatment as scheduled today.  Repeat BMP in the a.m. for evaluation.    3.  Bradycardia-patient's baseline heart rate appears to be in the mid mid 50s to  high 40s however patient has stayed in the low 40s during his initial assessment.  Will hold Coreg until further evaluated.    4.  Resistant hypertension-will continue extensive home regimen, verified with pharmacy and PCP office notes.  Initiate every 4 vital signs continue home clonidine patch, scheduled clonidine, hydralazine, Imdur, and Procardia.    5.  PAD-every 4 neurochecks, notify provider immediately of any loss of pulse or worsening symptoms.  Will continue to monitor closely continue home Plavix.  Explained    6.  Reviewed and restarted home medications as appropriate.    VTE prophylaxis with SCDs  Labs in a.m.    Medical Decision Making  1 acute on chronic, high complexity, worsening  4 acute on chronic, moderate complexity, unchanged    Number and Complexity of problems: 5  Differential Diagnosis:  None    Conditions and Status        Condition is unchanged.     [facility] Data  External documents reviewed: Epic review of most recent hospital admission,, cancer center notes and oncology office notes.  Cardiac tracing (EKG, telemetry) interpretation: Stat EKG pending  Radiology interpretation: None  Labs reviewed: 4/28/2025 CBC, BMP, iron profile, ferritin reviewed, repeat labs in a.m.  Any tests that were considered but not ordered: None     Decision rules/scores evaluated (example [id number], Wells, etc): None     Discussed with: Dr. Camarena, patient and his tomasa Wang     Care Planning  Shared decision making: Dr. Camarena, patient and his daughter Kathleen  Code status and discussions: Full code per patient    Disposition  Social Determinants of Health that impact treatment or disposition: Undetermined at this time  Estimated length of stay is 1-2 days.     I confirmed that the patient's advanced care plan is present, code status is documented, and a surrogate decision maker is listed in the patient's medical record.     The patient's surrogate decision maker is tomasa Wang.     The patient was seen  and examined by me on 4/28/2025 at 1126.    Electronically signed by Liz Arizmendi, SHELLY, 04/28/25, 12:30 CDT.

## 2025-04-28 NOTE — CASE MANAGEMENT/SOCIAL WORK
Discharge Planning Assessment  [facility]     Patient Name: Ricardo Hugo  MRN: [id number]  Today's Date: 4/28/2025    Admit Date: 4/28/2025        Discharge Needs Assessment       Row Name 04/28/25 1316       Living Environment    People in Home alone    Current Living Arrangements home    Potentially Unsafe Housing Conditions none    In the past 12 months has the electric, gas, oil, or water company threatened to shut off services in your home? No    Primary Care Provided by self    Provides Primary Care For no one    Family Caregiver if Needed child(jamshid), adult    Family Caregiver Names jesi chavez (Daughter)  [phone] ([place])    Quality of Family Relationships helpful;involved;supportive    Able to Return to Prior Arrangements yes       Resource/Environmental Concerns    Resource/Environmental Concerns none    Transportation Concerns none       Transportation Needs    In the past 12 months, has lack of transportation kept you from medical appointments or from getting medications? no    In the past 12 months, has lack of transportation kept you from meetings, work, or from getting things needed for daily living? No       Food Insecurity    Within the past 12 months, you worried that your food would run out before you got the money to buy more. Never true    Within the past 12 months, the food you bought just didn't last and you didn't have money to get more. Never true       Transition Planning    Patient/Family Anticipates Transition to home    Transportation Anticipated family or friend will provide       Discharge Needs Assessment    Equipment Currently Used at Home none    Concerns to be Addressed discharge planning    Do you want help finding or keeping work or a job? I do not need or want help    Do you want help with school or training? For example, starting or completing job training or getting a high school diploma, GED or equivalent No    Anticipated Changes Related to Illness none    Equipment  Needed After Discharge none    Discharge Coordination/Progress Lives at home alone but has daughter involved and helpful.  No DME used.  PCP is Dr. Nathan Zimmer. Recent hospital stay and Outpatient dialysis set up.Pt's chair time is TTS 1040 at the [facility].  CM/SS will continue to follow and assist if needed with any discharge needs or planning.                   Discharge Plan    No documentation.                      Demographic Summary    No documentation.                  Functional Status    No documentation.                  Psychosocial    No documentation.                  Abuse/Neglect    No documentation.                  Legal    No documentation.                  Substance Abuse    No documentation.                  Patient Forms    No documentation.                     Imelda Beck RN

## 2025-04-28 NOTE — PLAN OF CARE
Problem: Adult Inpatient Plan of Care  Goal: Plan of Care Review  Outcome: Progressing  Flowsheets (Taken 4/28/2025 1566)  Outcome Evaluation: Patient RA. IV CDI, IID. Dialysis patient. Alert x4. Receiving blood in dialysis. VSS, safety maintained.

## 2025-04-28 NOTE — CONSULTS
Nephrology ([facility]) Consult Note      Patient:  Ricardo Hugo  YOB: 1950  Date of Service: 4/28/2025  MRN: [id number]   Acct: [id number]   Primary Care Physician: Natahn Zimmer MD  Advance Directive:   Code Status and Medical Interventions: No CPR (Do Not Attempt to Resuscitate); Limited Support; No intubation (DNI)   Ordered at: 04/28/25 1304     Code Status (Patient has no pulse and is not breathing):    No CPR (Do Not Attempt to Resuscitate)     Medical Interventions (Patient has pulse or is breathing):    Limited Support     Medical Intervention Limits:    No intubation (DNI)     Level Of Support Discussed With:    Patient     Admit Date: 4/28/2025       Hospital Day: 0  Referring Provider: No Known Provider      Patient Seen, Chart, Consults, Notes, Labs, Radiology studies reviewed.    Chief complaint: Profound fatigue.    Subjective:  Ricardo Hugo is a 75 y.o. male  whom we were consulted for end-stage renal disease on maintenance dialysis.  Patient was sent from hematology office as he was found to have a hemoglobin of 7 g, needing blood transfusion.  Patient was also complaining of cold and clammy left lower extremity.  Last week he was hospitalized, underwent aortogram/runoff/angioplasty of bilateral lower extremities.  However he was sent home with inflated balloon at left common femoral artery.  In emergency room balloon was deflated and restored blood flow to his left lower extremity.  He is now admitted for further observation including blood transfusion and dialysis.    This afternoon he feels well.  He denies any shortness of breath he is scheduled for dialysis today    Currently seen on hemodialysis  Dialysis access: Permacath  Dialysis: 3-1/2-hour  Ultrafiltration: 2000 cc  2K bath  Blood flow rate is 450 cc/min.    Allergies:  Ondansetron, Zofran [ondansetron hcl], Lortab [hydrocodone-acetaminophen], and Allopurinol    Home  Meds:  Facility-Administered Medications Prior to Admission   Medication Dose Route Frequency Provider Last Rate Last Admin    cyanocobalamin injection 1,000 mcg  1,000 mcg Intramuscular Q28 Days Ruthie Parra, APRN   1,000 mcg at 08/11/23 1123     Medications Prior to Admission   Medication Sig Dispense Refill Last Dose/Taking    ALPRAZolam (XANAX) 0.25 MG tablet Take 1 tablet by mouth Every Night.   Taking    aspirin (aspirin) 81 MG EC tablet Take 1 tablet by mouth Daily.   Taking    atorvastatin (LIPITOR) 10 MG tablet Take 1 tablet by mouth Daily. 90 tablet 3 Taking    calcitriol (ROCALTROL) 0.5 MCG capsule Take 1 capsule by mouth Daily. 90 capsule 2 Taking    carvedilol (COREG) 25 MG tablet Take 1 tablet by mouth 2 (Two) Times a Day With Meals.   Taking    cholestyramine light 4 g packet Take 1 packet by mouth Daily. 90 packet 1 Taking    cloNIDine (CATAPRES) 0.3 MG tablet Take 1 tablet by mouth 3 times a day.   Taking    Copper Gluconate (Copper Caps) 2 MG capsule Take 2 mg by mouth Daily.   Taking    empagliflozin (Jardiance) 10 MG tablet tablet Take 1 tablet by mouth Daily.   Taking    Epoetin Anselmo (PROCRIT IJ) Inject 1 dose as directed 1 (One) Time Per Week. Monday   Taking    fexofenadine (ALLEGRA) 180 MG tablet Take 1 tablet by mouth Daily.   Taking    fluticasone (FLONASE) 50 MCG/ACT nasal spray Administer 2 sprays into the nostril(s) as directed by provider Daily.   Taking    furosemide (LASIX) 40 MG tablet Take 1 tablet by mouth Daily. 90 tablet 2 Taking    hydrALAZINE (APRESOLINE) 100 MG tablet Take 1 tablet by mouth 3 (Three) Times a Day. 100mg daily   Taking    isosorbide dinitrate (ISORDIL) 10 MG tablet Take 1 tablet by mouth 3 (Three) Times a Day. 270 tablet 2 Taking    levothyroxine (Synthroid) 100 MCG tablet Take 1 tablet by mouth Every Morning. 90 tablet 2 Taking    magnesium chloride ER 64 MG DR tablet Take 1 tablet by mouth Daily.   Taking    melatonin 5 MG tablet tablet Take 3 tablets  by mouth Every Night.   Taking    mesalamine (APRISO) 0.375 g 24 hr capsule Take 4 capsules by mouth Daily. 360 capsule 1 Taking    montelukast (SINGULAIR) 10 MG tablet Take 1 tablet by mouth Every Night.   Taking    Multiple Vitamins-Minerals (PRESERVISION/LUTEIN) capsule Take 1 capsule by mouth 2 (two) times a day.   Taking    NIFEdipine XL (PROCARDIA XL) 60 MG 24 hr tablet Take 1 tablet by mouth Daily. (Patient taking differently: Take 2 tablets by mouth Daily.)   Taking Differently    omeprazole (priLOSEC) 20 MG capsule Take 1 capsule by mouth Daily.   Taking    sodium bicarbonate 650 MG tablet Take 1 tablet by mouth 5 (Five) Times a Day.   Taking    spironolactone (ALDACTONE) 25 MG tablet Take 1 tablet by mouth Daily.   Taking    tamsulosin (FLOMAX) 0.4 MG capsule 24 hr capsule Take 1 capsule by mouth Every Night.   Taking    valsartan (DIOVAN) 320 MG tablet Take 1 tablet by mouth Daily.   Taking    albuterol sulfate  (90 Base) MCG/ACT inhaler Inhale 2 puffs Every 6 (Six) Hours As Needed for Wheezing or Shortness of Air.       Budeson-Glycopyrrol-Formoterol (Breztri Aerosphere) 160-9-4.8 MCG/ACT aerosol inhaler Inhale 2 puffs 2 (Two) Times a Day As Needed.       cloNIDine (CATAPRES-TTS) 0.2 MG/24HR patch Place 1 patch on the skin as directed by provider 1 (One) Time Per Week. THURSDAYS       clopidogrel (PLAVIX) 75 MG tablet Take 1 tablet by mouth Daily.       desoximetasone (TOPICORT) 0.25 % cream Apply 1 Application topically to the appropriate area as directed 2 (Two) Times a Day As Needed for Irritation. irritation       docusate sodium (COLACE) 100 MG capsule Take 1 capsule by mouth 3 (Three) Times a Day As Needed for Constipation.       NON FORMULARY Take 25 mg by mouth At Night As Needed. Zzzquill       traMADol (ULTRAM) 50 MG tablet Take 1 tablet by mouth Every 6 (Six) Hours As Needed for Moderate Pain. 20 tablet 0     vitamin D (ERGOCALCIFEROL) 1.25 MG (39399 UT) capsule capsule Take 1 capsule  by mouth 1 (One) Time Per Week.          Medicines:  Current Facility-Administered Medications   Medication Dose Route Frequency Provider Last Rate Last Admin    albuterol (PROVENTIL) nebulizer solution 0.083% 2.5 mg/3mL  2.5 mg Nebulization Q6H PRN Dinesh Camarena MD        ALPRAZolam (XANAX) tablet 0.25 mg  0.25 mg Oral Nightly Dinesh Camarena MD        aspirin EC tablet 81 mg  81 mg Oral Daily Liz Arizmendi APRN        atorvastatin (LIPITOR) tablet 10 mg  10 mg Oral Daily Liz Arizmendi APRN        sennosides-docusate (PERICOLACE) 8.6-50 MG per tablet 2 tablet  2 tablet Oral BID PRN Liz Arizmendi APRN        And    polyethylene glycol (MIRALAX) packet 17 g  17 g Oral Daily PRN Liz Arizmendi APRN        And    bisacodyl (DULCOLAX) EC tablet 5 mg  5 mg Oral Daily PRN Liz Arizmendi APRN        And    bisacodyl (DULCOLAX) suppository 10 mg  10 mg Rectal Daily PRN Liz Arizmendi APRN        calcitriol (ROCALTROL) capsule 0.5 mcg  0.5 mcg Oral Daily Liz Arizmendi APRN        [Held by provider] carvedilol (COREG) tablet 25 mg  25 mg Oral BID With Meals Liz Arizmendi APRN        cetirizine (zyrTEC) tablet 10 mg  10 mg Oral Daily Liz Arizmendi APRN        cholecalciferol (VITAMIN D3) tablet 5,000 Units  5,000 Units Oral Daily Liz Arizmendi APRN        cloNIDine (CATAPRES) tablet 0.3 mg  0.3 mg Oral Q8H Liz Arizmendi APRN        [START ON 5/1/2025] cloNIDine (CATAPRES-TTS) 0.2 MG/24HR patch 1 patch  1 patch Transdermal Weekly Liz Arizmendi APRN        clopidogrel (PLAVIX) tablet 75 mg  75 mg Oral Daily Liz Arizmendi APRN        docusate sodium (COLACE) capsule 100 mg  100 mg Oral TID PRN Liz Arizmendi APRN        empagliflozin (JARDIANCE) tablet 10 mg  10 mg Oral Daily , [place]        epoetin cecile-epbx (RETACRIT) 5,000 Units 2 mL injection  5,000 Units Subcutaneous Once Dinesh Camarena MD        fluticasone (FLONASE) 50 MCG/ACT nasal  spray 2 spray  2 spray Nasal Daily Liz Arizmendi APRN        furosemide (LASIX) tablet 40 mg  40 mg Oral Daily Liz Arizmendi APRN        heparin (porcine) injection 3,200 Units  3,200 Units Intracatheter PRN Giuliano Saldana MD        hydrALAZINE (APRESOLINE) tablet 100 mg  100 mg Oral TID Liz Arizmendi APRN        isosorbide dinitrate (ISORDIL) tablet 10 mg  10 mg Oral TID - Nitrates Liz peoples APRN        [START ON 4/29/2025] levothyroxine (SYNTHROID, LEVOTHROID) tablet 100 mcg  100 mcg Oral Q AM Liz peoples APRN        melatonin tablet 10 mg  10 mg Oral Nightly Liz peoples APRN        mesalamine (APRISO) 24 hr capsule 1.5 g  1.5 g Oral Daily Liz Arizmendi APRN        montelukast (SINGULAIR) tablet 10 mg  10 mg Oral Nightly Liz peoples APRN        NIFEdipine XL (PROCARDIA XL) 24 hr tablet 120 mg  120 mg Oral Daily Liz Arizmendi APRN        [START ON 4/29/2025] pantoprazole (PROTONIX) EC tablet 40 mg  40 mg Oral Q AM Liz peoples APRN        sodium bicarbonate tablet 650 mg  650 mg Oral 5x Daily Liz Arizmendi APRN        sodium chloride 0.9 % flush 10 mL  10 mL Intravenous Q12H Liz Arizmendi APRN        sodium chloride 0.9 % flush 10 mL  10 mL Intravenous PRN Liz Arizmendi APRN        sodium chloride 0.9 % infusion 40 mL  40 mL Intravenous PRN Liz Arizmendi APRN        spironolactone (ALDACTONE) tablet 25 mg  25 mg Oral Daily Liz Arizmendi APRN        tamsulosin (FLOMAX) 24 hr capsule 0.4 mg  0.4 mg Oral Nightly Liz peoples APRN        traMADol (ULTRAM) tablet 50 mg  50 mg Oral Q6H PRN Dinesh Camarena MD        valsartan (DIOVAN) tablet 320 mg  320 mg Oral Daily Liz peoples APRN           Past Medical History:  Past Medical History:   Diagnosis Date    3-vessel CAD 08/11/2020    Allergic rhinitis     Anemia     Anxiety disorder 04/27/2020    Arthritis     Asymmetrical sensorineural hearing loss 06/28/2017    Atherosclerosis of  native artery of both lower extremities with intermittent claudication 07/18/2019    Avascular necrosis of femoral head, left 07/11/2020    right hip after surgery    Carotid stenosis     Chronic mucoid otitis media     Chronic rhinitis     COPD (chronic obstructive pulmonary disease)     Coronary artery disease     HEART BYPASS 2004    Crohn's disease of large intestine with other complication 07/30/2020    Chronic diarrhea Colonoscopy July 2020 revealed mild patchy scattered hemosiderin staining with inflammation more so in rectosigmoid area.  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    Iron deficiency anemia 07/14/2020    Mixed hearing loss of left ear     PAD (peripheral artery disease) 08/11/2020    Perianal abscess     Pernicious anemia 08/17/2020    took shots but never diagnosed with b12 deficiency    Personal history of alcoholism 08/11/2020    quit drinking in 2013    Prostatic hypertrophy 08/11/2020    Sensorineural hearing loss     Sepsis with acute renal failure 09/15/2020    Shortness of breath 05/27/2021    Tinnitus     Ventricular tachycardia, nonsustained 07/14/2020    Weight loss 07/11/2020       Past Surgical History:  Past Surgical History:   Procedure Laterality Date    AORTAGRAM Right 4/25/2025    Procedure: RIGHT LOWER EXTREMITY ANGIOGRAM, SHOCKWAVE LITHOTRIPSY, BALLOON ANGIOPLASTY, MYNX CLOSURE;  Surgeon: Gil Pineda DO;  Location: [facility] OR;  Service: Vascular;  Laterality: Right;    ARTERY SURGERY  2021    right side on neck    CAROTID ENDARTERECTOMY Right 05/10/2021    Procedure: RIGHT CAROTID ENDARTERECTOMY WITH EEG;   Surgeon: Gil Pineda DO;  Location: [facility] HYBRID OR 12;  Service: Vascular;  Laterality: Right;    COLONOSCOPY N/A 07/02/2020    Procedure: COLONOSCOPY WITH ANESTHESIA;  Surgeon: Adrien Brewster MD;  Location: [facility] ENDOSCOPY;  Service: Gastroenterology;  Laterality: N/A;  pre op: diarrhea  post op: polyps  PCP: Joe Velasco MD    COLONOSCOPY N/A 10/13/2020    Procedure: COLONOSCOPY WITH ANESTHESIA;  Surgeon: Adrien Brewster MD;  Location: [facility] ENDOSCOPY;  Service: Gastroenterology;  Laterality: N/A;  Pre: Chronic Diarrhea, Crohn's  Post: AVM  Dr. Neftali Velasco  CO2 Inflation Used    COLONOSCOPY N/A 12/08/2023    Procedure: COLONOSCOPY WITH ANESTHESIA;  Surgeon: Adrien Brewster MD;  Location: [facility] ENDOSCOPY;  Service: Gastroenterology;  Laterality: N/A;  pre chrone's disease  post sub optimal prep, polyp, chrone's      CORONARY ARTERY BYPASS GRAFT  2003    x3    ENDOSCOPY N/A 11/02/2021    Procedure: ESOPHAGOGASTRODUODENOSCOPY WITH ANESTHESIA;  Surgeon: Bridger Bell MD;  Location: [facility] ENDOSCOPY;  Service: Gastroenterology;  Laterality: N/A;  pre anemia;gi bleed  post  gi bleed;schatski ring  Dr. ERIC Velasco    ENDOSCOPY N/A 10/10/2023    Procedure: ESOPHAGOGASTRODUODENOSCOPY WITH ANESTHESIA;  Surgeon: Adrien Brewster MD;  Location: [facility] ENDOSCOPY;  Service: Gastroenterology;  Laterality: N/A;  preop; anemia  postop esophagitis ; R/O barretts   PCP Randall Beata    EYE SURGERY Bilateral     catorac    INCISION AND DRAINAGE PERIRECTAL ABSCESS N/A 03/03/2017    Procedure: INCISION AND DRAINAGE OF JEET ANAL ABSCESS;  Surgeon: Lynette Smith MD;  Location: [facility] OR;  Service:     INGUINAL HERNIA REPAIR Bilateral 06/27/2023    Procedure: INGUINAL HERNIA BILATERAL REPAIR LAPAROSCOPIC WITH DAVINCI ROBOT WITH MESH;  Surgeon: Tahira Rivera MD;  Location: [facility] OR;  Service: Robotics - DaVinci;  Laterality: Bilateral;    INSERTION HEMODIALYSIS CATHETER N/A 4/16/2025     "Procedure: HEMODIALYSIS CATHETER PLACEMENT;  Surgeon: Gil Pineda DO;  Location:  [facility] HYBRID OR;  Service: Vascular;  Laterality: N/A;    MYRINGOTOMY W/ TUBES Left 2017    06/10/2016    TONSILLECTOMY      TOTAL HIP ARTHROPLASTY Right        Family History  Family History   Problem Relation Age of Onset    Breast cancer Mother     Dementia Father     Glaucoma Father     No Known Problems Daughter     Colon polyps Neg Hx     Colon cancer Neg Hx        Social History  Social History     Socioeconomic History    Marital status:    Tobacco Use    Smoking status: Former     Current packs/day: 0.00     Average packs/day: 0.5 packs/day for 25.8 years (12.9 ttl pk-yrs)     Types: Cigarettes     Start date:      Quit date: 10/13/2013     Years since quittin.5     Passive exposure: Past    Smokeless tobacco: Never    Tobacco comments:     quit    Vaping Use    Vaping status: Former    Substances: Nicotine    Devices: Pre-filled or refillable cartridge   Substance and Sexual Activity    Alcohol use: Not Currently    Drug use: No    Sexual activity: Not Currently     Partners: Female         Review of Systems:  History obtained from chart review and the patient  General ROS: No fever or chills  Respiratory ROS: positive for - shortness of breath  Cardiovascular ROS: no chest pain or dyspnea on exertion  Gastrointestinal ROS: No abdominal pain or melena  Genito-Urinary ROS: No dysuria or hematuria  14 point ROS reviewed with the patient and negative except as noted above and in the HPI unless unable to obtain.    Objective:  /53   Pulse 54   Temp 97.7 °F (36.5 °C) (Oral)   Resp 18   Ht 182.9 cm (72\")   Wt 66.7 kg (147 lb)   SpO2 98%   BMI 19.94 kg/m²     General: awake/alert   HEENT: Normocephalic atraumatic head  Chest:  clear to auscultation bilaterally without respiratory distress  CVS: regular rate and rhythm  Abdominal: soft, nontender, normal bowel sounds  Extremities: " "no cyanosis or edema  Skin: warm and dry without rash      Labs:    Results from last 7 days   Lab Units 04/28/25  1009 04/28/25  0845 04/24/25  0504   WBC 10*3/mm3 5.46 6.45 4.20   HEMOGLOBIN g/dL 7.1* 7.1* 9.9*   HEMATOCRIT % 22.7* 23.6* 31.5*   PLATELETS 10*3/mm3 93* 99* 100*       Results from last 7 days   Lab Units 04/28/25  1009 04/28/25  0845 04/25/25  1132   SODIUM mmol/L 135* 136 138   POTASSIUM mmol/L 4.8 4.8 3.7   CHLORIDE mmol/L 98 98 100   CO2 mmol/L 22.0 22.0 28.0   BUN mg/dL 64* 61* 20   CREATININE mg/dL 4.39* 4.26* 1.59*   CALCIUM mg/dL 8.7 8.8 8.4*   BILIRUBIN mg/dL 0.2  --   --    ALK PHOS U/L 133*  --   --    ALT (SGPT) U/L <5  --   --    AST (SGOT) U/L 17  --   --    GLUCOSE mg/dL 102* 116* 84   EGFR mL/min/1.73 13.1* 13.6* 44.4*         Radiology:   Imaging Results (Last 24 Hours)       ** No results found for the last 24 hours. **            Culture:  No components found for: \"WOUNDCUL\", \"3\"  No components found for: \"CSFCUL\", \"3\"  No components found for: \"BC\", \"3\"  No components found for: \"URINECUL\", \"3\"      Assessment   1.  End-stage renal disease on maintenance dialysis.  2.  Hypertensive nephrosclerosis.  3.  Anemia of chronic kidney disease.  4.  Peripheral vascular disease status post aortogram/angioplasty of bilateral lower extremities.  5.  Secondary hyperparathyroidism.  6.  Mild hyponatremia.    Plan:  1.  Hemodialysis treatment today.  2.  Resume ABILIO/baseline iron studies  3.  Transfuse as needed.  4.  Plan was discussed with the patient      Thank you for the consult, we appreciate the opportunity to provide care to your patients.  Feel free to contact me if I can be of any further assistance.      Giuliano Saldana MD  4/28/2025  15:46 CDT  "

## 2025-04-28 NOTE — PROGRESS NOTES
Pt discharged from hospital on Saturday and is now on hemodialysis. Pt going to dialysis today. Becky Camacho NP informed and speaking with patient this morning.

## 2025-04-28 NOTE — OUTREACH NOTE
Call Center TCM Note      Flowsheet Row Responses   [facility] facility patient discharged from? [place]   Does the patient have one of the following disease processes/diagnoses(primary or secondary)? General Surgery   TCM attempt successful? No   Unsuccessful attempts Attempt 1  [Pt currently in ED]            Trudi LYNCH - Licensed Nurse    4/28/2025, 11:08 CDT

## 2025-04-28 NOTE — PROGRESS NOTES
After seeing patient and reviewing symptoms and labs (cool/pain to left foot and drop in H&H), pt encouraged to go to ED for assessment by BAMBI Camacho NP. Report called to Krysten ROMO and patient taken to ER. HANY ROMO

## 2025-04-28 NOTE — OUTREACH NOTE
Call Center TCM Note      Flowsheet Row Responses   [facility] facility patient discharged from? [place]   Does the patient have one of the following disease processes/diagnoses(primary or secondary)? General Surgery   TCM attempt successful? No   Unsuccessful attempts Attempt 2  [Pt currently in ED]            Trudi LYNCH - Licensed Nurse    4/28/2025, 12:04 CDT

## 2025-04-28 NOTE — PROGRESS NOTES
Pt tolerated tx well without complications. 2 L removed (accounted for 300ml blood given) pt stable.      [facility] [place] INPATIENT SERVICES  DIALYSIS TREATMENT SUMMARY     Note: Consult with the attending physician for patient treatment orders, this document is not a physician order.     Patient Information  Patient Ricardo Hugo  Date of Birth February 22, 1950  Chart Number [id number]  Location [facility]  Location MRN [id number]  Gender Male  SSN (last 4)   Treatment Information  Treatment Type Hemodialysis  Treatment Id [id number]  Start Time April 28, 2025 15:00  End Time April 28, 2025 18:28  Acutal Duration 03:28  Treatment Balances  Total Saline Administered 500  Other 300  Net Fluid Balance -2000  Hemodialysis Orders  Therapy Standard  Orders Verified Time 04/28/2025 14:16   Date Verified 04/28/2025  Duration 3:30  Isolated UF/Bypass No  BFR (mL) 450  DFR X1.5 BFR  DFR (mL) 800  Dialyzer Type OPTIFLUX 160NR  UF Order UF Goal Ordered  UF Goal Ordered (mL) 2000  Crit-Line used No  Heparin Initial Units Bolus No  Heparin IV Maintenance Bolus No  Heparin IV Infusion No  Potassium (mEq/L) 3  Calcium (mEq/L) 2.5  Bicarb (mg/dL) 35  Sodium (mEq/L) 138  Clinician Virginia Velasco RN  Dialysis Access  Access Type Central Venous Catheter  Central Venous Catheter  Access Type Catheter - Tunneled  Access Location Chest Wall - R  Current/New [facility] Patient Yes  1st Use Catheter Verified by Previous Use  Catheter Care Completed per Policy Yes  Dressing Dry and Intact on Arrival Yes  Dressing Changed Yes  Type of Dressing Film Biopatch/CHG  Dressing Changed By [facility] Staff  Type of HD Caps Curos  HD Caps Changed Yes  CVC Line Education Provided Yes - Infection Prevention  Vitals  Pre-Treatment Vitals  Time Is BP being recorded? Pre BP Map BP Method Pulse RR Temp How was Weight Obtained? Pre Weight Previous Dry Weight Previous Post Weight Metric Target Fluid Removal (mL) Dialysate Confirmed Clinician  04/28/2025  14:55 BP/Map 160/58 (92) Noninvasive 53 20 97.1 How Obtained: Reported Floor Weight 66.7   Kgs 2000  Virginia Velasco, RN  Comments: Pre-Tx vitals  Intra Procedure Vitals  Rec Type Time Is BP being recorded? BP Map Pulse RR BFR DFR AP  TMP UFR RMVD Bolus NSS Flush Chills Safety Check Crit-Line HCT Crit-Line BV% Clinician  E 04/28/2025 15:00 BP/Map 149/63 (92) 55 20 450 700 -150 120 90 860     Yes   Virginia Velasco RN  Comments: Tx initiated, CVC accessed without difficulty. Pt is a& o x4, no complaints at this time.  E 04/28/2025 15:28 BP/Map 149/63 (92) 55 18 450 700 -170 130 90 860        Kristan Beck RN  Comments: blood started. [id number]  E 04/28/2025 15:30 BP/Map 165/53 (90) 54 18 450 700 -170 130 9 860     Yes   Kristan Beck RN  Comments: pt awake and alert. vitals stable. no complications.  E 04/28/2025 16:00 BP/Map 176/64 (101) 55 20 450 700 -170 130 90 860 954    Yes   Kristan Beck RN  Comments: pt sleeping. vital signs stable. blood infusing.  E 04/28/2025 16:30 BP/Map 196/71 (113) 58 18 450 700 -170 130 80 860 1334    Yes   Kristan Beck RN  Comments: blood finished. vitals stable. no s/s of infusion reaction.  E 04/28/2025 17:00 BP/Map 149/63 (92) 55 18 450 700 -180 130 90 860 1812    Yes   Kristan Beck RN  Comments: pt awake and alert. on computer. vitals stable. hemosafe remains in place.  E 04/28/2025 17:15 BP/Map               Yes   Kristan Beck RN  Comments: goal reduced to 2800ml due to cramping.  E 04/28/2025 17:30 BP/Map 186/56 (99) 56 18 450 700 -180 130 90 860 2170    Yes   Kristan Beck RN  Comments: pt awake and alert. pt having pain in right foot. Called floor rn, she is bringing it down.  E 04/28/2025 18:00 BP/Map 167/62 (97) 56 18 450 700 -180 130 90 860     Yes   Kristan Beck RN  Comments: pt awake and alert. vitals stable.  E 04/28/2025 18:28 BP/Map 167/62 (97) 56 18 200 700 -10 60 30 300 2800    Yes   Kristan Beck RN  Comments: tx complete. blood returning.  Post  Treatment Vitals  Time Is BP being recorded? BP Map Pulse RR Temp How was Weight Obtained? Post Weight Metric BVP UF Goal Ordered NSS Given Intra-Procedure Total Machine UF Removed (mL) Crit-Line Ending Profile Crit-Line Refill Crit-Line Ending HCT Crit-Line Max BV% Clinician  04/28/2025 18:43 BP/Map 178/60 (99) 55 18 97.4 How Obtained: Reported Floor Weight 64.7 Kgs 82.2 2000 0 2800     Kristan Beck RN  Comments: blood returned.  Safety checks include: access uncovered and secured, Hemaclip secured for all central line access, machine checks performed, and alarm limits confirmed.  Labs  Hepatitis  HBsAG Lab Result HBsAG Lab Result HBsAG Draw Date Transient Antigenemia(MD Diagnosis Only) Anti-HBs Lab Result Anti-HBs Lab Value Anti-HBs Draw Date Documented By Documented Date Hepatitis Status Hepatitis Status  Negative  04/20/2025  Negative  04/20/2025 Virginia Velasco 04/28/2025  Susceptible  Notes:   Pre-Treatment Hepatitis Precautions Hard copy verified by nurse and placed in patient’s hospital chart Yes Signing  Patient tx at bedside 1:1 Yes Copy of hepatitis results verified in hospital EMR Yes Signed By Virginia Velasco RN  Patient tx with immune pts only Yes Labs Drawn Yes   Patient tx outside buffer zone Yes Hepatitis Information Entered By Virginia Velasco RN   Pre-Treatment Handoff  Staff Report Received Yes  Report Given by Primary Nurse Kait Rodriguez  Time 14:30  Patient Arrival  Patient ID Verified Date of Birth  MRN  Full Name  Patient Consent to treatment verified Yes  Blood Transfusion Consent Verified N/A  Treatment Comments  Treatment Notes pt tolerated tx well without complications. 2 L removed. (1 unit of blood given during tx and accounted for). Lumens flushed, hep locked and capped. pt stable. CVC dressing changed.  Post-Treatment Handoff  Report Given to Primary Nurse Kait Rodriguez  Time Report Given 18:45  Report Given By Kristan Beck RN  Machine Validation  Time 14:30  Date 4/28/2025  Auto Alarm  Test Passed Yes  Machine Serial # [id number]  Portable RO Yes  RO Serial# [id number]  Residual Bleach Negative Yes  Was a manufactured mix used? Yes  Machine Log Completed Yes  Total Chlorine (less than 0.1)? Yes  Total Chlorine Log Completed Yes  Bicarb BiBag  Bibag Size 900  Machine Temp 36.9  Machine Conductivity 13.7  Meter Type N/A  Meter Conductivity   Independent Conductivity 13.6  pH Status Pass Pass  pH   TCD Value 13.6  TCD Alarm with +/- 0.5 Yes  NVL enabled validated 100 asymmetric Yes  Safety check complete Virginia Velasco RN  Second Verification Performed? Yes  Second Verification Performed By Kristan Beck RN  Reason Not Verified   Facility Information Room # 372 Diagnosis  Facility Information Bed # 5 Diagnosis Anemia, multifactorial to include chronic kidney disease, iron deficiency and possible bleed  Admission Date 04/28/2025 Stat Treatment No Isolation Information  Ordering MD Rueda Patient Type New patient to dialysis with diagnosis of ESRD Isolation Required? N  Account/Finance Number [id number] Patient Chronic Unit [facility] Completed by  Admission Status OBS Patient Home Unit [facility] information Entered by Kristan Beck RN  Location Dialysis Suite Multi Code Status Full Code   Start Treatment Time Out Confirmed by Virginia Velasco RN Correct access site verified Yes  Treatment Initiation Connections Secured Time Out Completed 14:45 Treatment Start Date 04/28/2025  Saline line double clamped Correct patient verified Yes Treatment Start Time 15:00  Hemaclip Applied Correct procedure verified Yes Patient/Family questions and concerns addressed Yes  Pre Focused Assessment Edema GI / Bowels  Access Location None GI Bowel sounds present  Signs and Symptoms of Infection? No Cardiac   Pain Screening Heart Rhythm Regular  Voids  Does the patient have pain? No Telemetry No Completed by  Respiratory Skin Pre Treatment Focused Assessment Completed By Virginia Velasco RN  Lung  Sounds Clear Skin Warm Time 14:30  Location Bilateral  Dry Signing  Position Anterior LOC Signed By Virginia Velacso RN  Respiratory Efforts Unlabored LOC Alert and Oriented x3   Education  Fluid Intake  Blood Transfusions  Patient Education  S&S over/under filtration Method Knowledge / Understanding Assessed Teach back  Patient Educated? Yes  Hemodialysis treatment review Family Education Provided? N/A  Focus or Topic New Access  Treatment Options Caregiver Education Provided? Yes  Access Maintenance  Catheter Reduction Focus or Topic Hemodialysis treatment review  Infection Prevention  Medications Method Knowledge / Understanding Assessed Teach back  S&S of Infection  Treatment Adherence Patient Education Reinforced By  Diet and/or exercise  Missed Treatment Patient Education Reinforced by Virginia Velasco RN  Post-Treatment HD machine external disinfection completed per policy Yes Completed by  Post Treatment Delay PRO external disinfection completed per policy Yes Post Treatment Form Completed By Kristan Beck RN  Delay N Post Treatment General Information   Machine Disinfection Requirement Notes pt tolerated tx well without complications. 2 L removed. (1 unit of blood given during tx and accounted for). Lumens flushed, hep locked and capped. pt stable. CVC dressing changed.   Post Focused Assessment Lung Sounds Clear LOC Alert and Oriented x3  Changes from Pre Focused Assessment Location Bilateral GI / Bowels  Changes from Pre Treatment Focused Assessment? No Position Anterior GI Bowel sounds present  Access Respiratory Efforts Unlabored   Cath Packed with heparin 1000 units/mL Edema  Voids  Access Port(ml) 1.6 Location None Completed by  Return Port(ml) 1.6 Cardiac Post Treatment Focused Assessment Completed by Kristan Beck RN  Catheter clamped and capped Yes Heart Rhythm Regular Date 04/28/2025  Access Flow Good Telemetry No Time 18:44  Dialyzer Clearance Streaked Skin Signing  Pain  Screening Skin Warm Signed By Kristan Beck RN  Does the patient have pain? No  Dry   Respiratory LOC

## 2025-04-28 NOTE — ED NOTES
Doppler of bilateral feet completed at bedside.  Right foot dorsalis pedis marked.  Left foot was found near ankle region and marked

## 2025-04-29 ENCOUNTER — READMISSION MANAGEMENT (OUTPATIENT)
Dept: CALL CENTER | Facility: HOSPITAL | Age: 75
End: 2025-04-29
Payer: MEDICARE

## 2025-04-29 VITALS
OXYGEN SATURATION: 97 % | RESPIRATION RATE: 16 BRPM | DIASTOLIC BLOOD PRESSURE: 38 MMHG | WEIGHT: 147 LBS | HEIGHT: 72 IN | HEART RATE: 50 BPM | TEMPERATURE: 98.6 F | BODY MASS INDEX: 19.91 KG/M2 | SYSTOLIC BLOOD PRESSURE: 140 MMHG

## 2025-04-29 LAB
ALBUMIN SERPL-MCNC: 3.4 G/DL (ref 3.5–5.2)
ALBUMIN/GLOB SERPL: 1.2 G/DL
ALP SERPL-CCNC: 183 U/L (ref 39–117)
ALT SERPL W P-5'-P-CCNC: <5 U/L (ref 1–41)
ANION GAP SERPL CALCULATED.3IONS-SCNC: 14 MMOL/L (ref 5–15)
AST SERPL-CCNC: 25 U/L (ref 1–40)
BASOPHILS # BLD AUTO: 0.05 10*3/MM3 (ref 0–0.2)
BASOPHILS NFR BLD AUTO: 1 % (ref 0–1.5)
BH BB BLOOD EXPIRATION DATE: NORMAL
BH BB BLOOD TYPE BARCODE: 9500
BH BB DISPENSE STATUS: NORMAL
BH BB PRODUCT CODE: NORMAL
BH BB UNIT NUMBER: NORMAL
BILIRUB SERPL-MCNC: 0.4 MG/DL (ref 0–1.2)
BUN SERPL-MCNC: 33 MG/DL (ref 8–23)
BUN/CREAT SERPL: 10 (ref 7–25)
CALCIUM SPEC-SCNC: 8.4 MG/DL (ref 8.6–10.5)
CHLORIDE SERPL-SCNC: 99 MMOL/L (ref 98–107)
CO2 SERPL-SCNC: 25 MMOL/L (ref 22–29)
CREAT SERPL-MCNC: 3.31 MG/DL (ref 0.76–1.27)
CROSSMATCH INTERPRETATION: NORMAL
DEPRECATED RDW RBC AUTO: 67.1 FL (ref 37–54)
EGFRCR SERPLBLD CKD-EPI 2021: 18.4 ML/MIN/1.73
EOSINOPHIL # BLD AUTO: 0.27 10*3/MM3 (ref 0–0.4)
EOSINOPHIL NFR BLD AUTO: 5.2 % (ref 0.3–6.2)
ERYTHROCYTE [DISTWIDTH] IN BLOOD BY AUTOMATED COUNT: 18.8 % (ref 12.3–15.4)
GLOBULIN UR ELPH-MCNC: 2.8 GM/DL
GLUCOSE SERPL-MCNC: 95 MG/DL (ref 65–99)
HCT VFR BLD AUTO: 28.6 % (ref 37.5–51)
HGB BLD-MCNC: 9.2 G/DL (ref 13–17.7)
IMM GRANULOCYTES # BLD AUTO: 0.03 10*3/MM3 (ref 0–0.05)
IMM GRANULOCYTES NFR BLD AUTO: 0.6 % (ref 0–0.5)
LYMPHOCYTES # BLD AUTO: 0.67 10*3/MM3 (ref 0.7–3.1)
LYMPHOCYTES NFR BLD AUTO: 12.8 % (ref 19.6–45.3)
MCH RBC QN AUTO: 31.9 PG (ref 26.6–33)
MCHC RBC AUTO-ENTMCNC: 32.2 G/DL (ref 31.5–35.7)
MCV RBC AUTO: 99.3 FL (ref 79–97)
MONOCYTES # BLD AUTO: 0.57 10*3/MM3 (ref 0.1–0.9)
MONOCYTES NFR BLD AUTO: 10.9 % (ref 5–12)
NEUTROPHILS NFR BLD AUTO: 3.63 10*3/MM3 (ref 1.7–7)
NEUTROPHILS NFR BLD AUTO: 69.5 % (ref 42.7–76)
NRBC BLD AUTO-RTO: 0 /100 WBC (ref 0–0.2)
PLATELET # BLD AUTO: 89 10*3/MM3 (ref 140–450)
PMV BLD AUTO: 10.8 FL (ref 6–12)
POTASSIUM SERPL-SCNC: 4.3 MMOL/L (ref 3.5–5.2)
PROT SERPL-MCNC: 6.2 G/DL (ref 6–8.5)
RBC # BLD AUTO: 2.88 10*6/MM3 (ref 4.14–5.8)
SODIUM SERPL-SCNC: 138 MMOL/L (ref 136–145)
UNIT  ABO: NORMAL
UNIT  RH: NORMAL
WBC NRBC COR # BLD AUTO: 5.22 10*3/MM3 (ref 3.4–10.8)

## 2025-04-29 PROCEDURE — 85025 COMPLETE CBC W/AUTO DIFF WBC: CPT | Performed by: INTERNAL MEDICINE

## 2025-04-29 PROCEDURE — G0378 HOSPITAL OBSERVATION PER HR: HCPCS

## 2025-04-29 PROCEDURE — 80053 COMPREHEN METABOLIC PANEL: CPT

## 2025-04-29 RX ADMIN — CALCITRIOL CAPSULES 0.25 MCG 0.5 MCG: 0.25 CAPSULE ORAL at 08:18

## 2025-04-29 RX ADMIN — TRAMADOL HYDROCHLORIDE 50 MG: 50 TABLET, COATED ORAL at 03:46

## 2025-04-29 RX ADMIN — TRAMADOL HYDROCHLORIDE 50 MG: 50 TABLET, COATED ORAL at 09:44

## 2025-04-29 RX ADMIN — PANTOPRAZOLE SODIUM 40 MG: 40 TABLET, DELAYED RELEASE ORAL at 05:30

## 2025-04-29 RX ADMIN — VALSARTAN 320 MG: 80 TABLET, FILM COATED ORAL at 08:17

## 2025-04-29 RX ADMIN — FLUTICASONE PROPIONATE 2 SPRAY: 50 SPRAY, METERED NASAL at 08:18

## 2025-04-29 RX ADMIN — CARVEDILOL 25 MG: 6.25 TABLET, FILM COATED ORAL at 08:17

## 2025-04-29 RX ADMIN — FUROSEMIDE 40 MG: 40 TABLET ORAL at 08:17

## 2025-04-29 RX ADMIN — Medication 10 ML: at 08:19

## 2025-04-29 RX ADMIN — ASPIRIN 81 MG: 81 TABLET, COATED ORAL at 08:17

## 2025-04-29 RX ADMIN — CETIRIZINE HYDROCHLORIDE 10 MG: 10 TABLET, FILM COATED ORAL at 08:17

## 2025-04-29 RX ADMIN — LEVOTHYROXINE SODIUM 100 MCG: 100 TABLET ORAL at 05:30

## 2025-04-29 RX ADMIN — HYDRALAZINE HYDROCHLORIDE 100 MG: 50 TABLET ORAL at 08:17

## 2025-04-29 RX ADMIN — NIFEDIPINE 120 MG: 60 TABLET, FILM COATED, EXTENDED RELEASE ORAL at 08:16

## 2025-04-29 RX ADMIN — Medication 5000 UNITS: at 08:17

## 2025-04-29 RX ADMIN — EMPAGLIFLOZIN 10 MG: 10 TABLET, FILM COATED ORAL at 08:17

## 2025-04-29 RX ADMIN — CLONIDINE HYDROCHLORIDE 0.3 MG: 0.1 TABLET ORAL at 05:29

## 2025-04-29 RX ADMIN — SPIRONOLACTONE 25 MG: 25 TABLET ORAL at 08:17

## 2025-04-29 RX ADMIN — ATORVASTATIN CALCIUM 10 MG: 10 TABLET, FILM COATED ORAL at 08:18

## 2025-04-29 RX ADMIN — ISOSORBIDE DINITRATE 10 MG: 10 TABLET ORAL at 08:17

## 2025-04-29 RX ADMIN — SODIUM BICARBONATE 650 MG: 650 TABLET ORAL at 05:29

## 2025-04-29 RX ADMIN — MESALAMINE 1.5 G: 375 CAPSULE, EXTENDED RELEASE ORAL at 08:17

## 2025-04-29 RX ADMIN — CLOPIDOGREL BISULFATE 75 MG: 75 TABLET, FILM COATED ORAL at 08:17

## 2025-04-29 RX ADMIN — SODIUM BICARBONATE 650 MG: 650 TABLET ORAL at 10:29

## 2025-04-29 NOTE — OUTREACH NOTE
Call Center TCM Note      Flowsheet Row Responses   [facility] facility patient discharged from? [facility] [place]   Does the patient have one of the following disease processes/diagnoses(primary or secondary)? General Surgery   TCM attempt successful? No   Unsuccessful attempts Attempt 1   Change in Health Status Readmitted            Leigh PINA - Registered Nurse    4/28/2025, 21:40 EDT

## 2025-04-29 NOTE — PROGRESS NOTES
Nephrology ([facility]) Progress Note      Patient:  Ricardo Hugo  YOB: 1950  Date of Service: 4/29/2025  MRN: [id number]   Acct: [id number]   Primary Care Physician: Nathan Zimmer MD  Advance Directive:   Code Status and Medical Interventions: No CPR (Do Not Attempt to Resuscitate); Limited Support; No intubation (DNI)   Ordered at: 04/28/25 1304     Code Status (Patient has no pulse and is not breathing):    No CPR (Do Not Attempt to Resuscitate)     Medical Interventions (Patient has pulse or is breathing):    Limited Support     Medical Intervention Limits:    No intubation (DNI)     Level Of Support Discussed With:    Patient     Admit Date: 4/28/2025       Hospital Day: 0  Referring Provider: No Known Provider      Patient personally seen and examined.  Complete chart including Consults, Notes, Operative Reports, Labs, Cardiology, and Radiology studies reviewed as able.        Subjective:  Ricardo Hugo is a 75 y.o. male for whom we were consulted for evaluation and treatment of end-stage renal disease on maintenance dialysis. Patient was sent from hematology office as he was found to have a hemoglobin of 7.1 and a cool lower left extremity.  Last week he was hospitalized and was started on hemodialysis. Also underwent aortogram/runoff/angioplasty of bilateral lower extremities. However he was sent home with inflated balloon at left common femoral artery. In emergency room balloon was deflated and restored blood flow to his left lower extremity. Admitted to medical floor. Received dialysis on 4/28 and also had unit PRBC given during HD, tolerated well.     Today is awake and alert. No new complaint. No overnight issues. Hopeful for discharge soon    Allergies:  Ondansetron, Zofran [ondansetron hcl], Lortab [hydrocodone-acetaminophen], and Allopurinol    Home Meds:  Facility-Administered Medications Prior to Admission   Medication Dose Route Frequency  Provider Last Rate Last Admin    cyanocobalamin injection 1,000 mcg  1,000 mcg Intramuscular Q28 Days Ruthie Parra, APRN   1,000 mcg at 08/11/23 1123     Medications Prior to Admission   Medication Sig Dispense Refill Last Dose/Taking    ALPRAZolam (XANAX) 0.25 MG tablet Take 1 tablet by mouth Every Night.   Taking    aspirin (aspirin) 81 MG EC tablet Take 1 tablet by mouth Daily.   Taking    atorvastatin (LIPITOR) 10 MG tablet Take 1 tablet by mouth Daily. 90 tablet 3 Taking    calcitriol (ROCALTROL) 0.5 MCG capsule Take 1 capsule by mouth Daily. 90 capsule 2 Taking    carvedilol (COREG) 25 MG tablet Take 1 tablet by mouth 2 (Two) Times a Day With Meals.   Taking    cholestyramine light 4 g packet Take 1 packet by mouth Daily. 90 packet 1 Taking    cloNIDine (CATAPRES) 0.3 MG tablet Take 1 tablet by mouth 3 times a day.   Taking    Copper Gluconate (Copper Caps) 2 MG capsule Take 2 mg by mouth Daily.   Taking    empagliflozin (Jardiance) 10 MG tablet tablet Take 1 tablet by mouth Daily.   Taking    Epoetin Anselmo (PROCRIT IJ) Inject 1 dose as directed 1 (One) Time Per Week. Monday   Taking    fexofenadine (ALLEGRA) 180 MG tablet Take 1 tablet by mouth Daily.   Taking    fluticasone (FLONASE) 50 MCG/ACT nasal spray Administer 2 sprays into the nostril(s) as directed by provider Daily.   Taking    furosemide (LASIX) 40 MG tablet Take 1 tablet by mouth Daily. 90 tablet 2 Taking    hydrALAZINE (APRESOLINE) 100 MG tablet Take 1 tablet by mouth 3 (Three) Times a Day. 100mg daily   Taking    isosorbide dinitrate (ISORDIL) 10 MG tablet Take 1 tablet by mouth 3 (Three) Times a Day. 270 tablet 2 Taking    levothyroxine (Synthroid) 100 MCG tablet Take 1 tablet by mouth Every Morning. 90 tablet 2 Taking    magnesium chloride ER 64 MG DR tablet Take 1 tablet by mouth Daily.   Taking    melatonin 5 MG tablet tablet Take 3 tablets by mouth Every Night.   Taking    mesalamine (APRISO) 0.375 g 24 hr capsule Take 4 capsules by  mouth Daily. 360 capsule 1 Taking    montelukast (SINGULAIR) 10 MG tablet Take 1 tablet by mouth Every Night.   Taking    Multiple Vitamins-Minerals (PRESERVISION/LUTEIN) capsule Take 1 capsule by mouth 2 (two) times a day.   Taking    NIFEdipine XL (PROCARDIA XL) 60 MG 24 hr tablet Take 1 tablet by mouth Daily. (Patient taking differently: Take 2 tablets by mouth Daily.)   Taking Differently    omeprazole (priLOSEC) 20 MG capsule Take 1 capsule by mouth Daily.   Taking    sodium bicarbonate 650 MG tablet Take 1 tablet by mouth 5 (Five) Times a Day.   Taking    spironolactone (ALDACTONE) 25 MG tablet Take 1 tablet by mouth Daily.   Taking    tamsulosin (FLOMAX) 0.4 MG capsule 24 hr capsule Take 1 capsule by mouth Every Night.   Taking    valsartan (DIOVAN) 320 MG tablet Take 1 tablet by mouth Daily.   Taking    albuterol sulfate  (90 Base) MCG/ACT inhaler Inhale 2 puffs Every 6 (Six) Hours As Needed for Wheezing or Shortness of Air.       Budeson-Glycopyrrol-Formoterol (Breztri Aerosphere) 160-9-4.8 MCG/ACT aerosol inhaler Inhale 2 puffs 2 (Two) Times a Day As Needed.       cloNIDine (CATAPRES-TTS) 0.2 MG/24HR patch Place 1 patch on the skin as directed by provider 1 (One) Time Per Week. THURSDAYS       clopidogrel (PLAVIX) 75 MG tablet Take 1 tablet by mouth Daily.       desoximetasone (TOPICORT) 0.25 % cream Apply 1 Application topically to the appropriate area as directed 2 (Two) Times a Day As Needed for Irritation. irritation       docusate sodium (COLACE) 100 MG capsule Take 1 capsule by mouth 3 (Three) Times a Day As Needed for Constipation.       NON FORMULARY Take 25 mg by mouth At Night As Needed. Zzzquill       traMADol (ULTRAM) 50 MG tablet Take 1 tablet by mouth Every 6 (Six) Hours As Needed for Moderate Pain. 20 tablet 0     vitamin D (ERGOCALCIFEROL) 1.25 MG (87906 UT) capsule capsule Take 1 capsule by mouth 1 (One) Time Per Week.          Medicines:  Current Facility-Administered Medications    Medication Dose Route Frequency Provider Last Rate Last Admin    albuterol (PROVENTIL) nebulizer solution 0.083% 2.5 mg/3mL  2.5 mg Nebulization Q6H PRN Dinesh Camarena MD        ALPRAZolam (XANAX) tablet 0.25 mg  0.25 mg Oral Nightly Dinesh Camarena MD   0.25 mg at 04/28/25 2116    aspirin EC tablet 81 mg  81 mg Oral Daily Liz Arizmendi APRN   81 mg at 04/29/25 0817    atorvastatin (LIPITOR) tablet 10 mg  10 mg Oral Daily Liz Arizmendi APRN   10 mg at 04/29/25 0818    sennosides-docusate (PERICOLACE) 8.6-50 MG per tablet 2 tablet  2 tablet Oral BID PRN Liz Arizmendi APRN        And    polyethylene glycol (MIRALAX) packet 17 g  17 g Oral Daily PRN Liz Arizmendi APRN        And    bisacodyl (DULCOLAX) EC tablet 5 mg  5 mg Oral Daily PRN Liz Arizmendi APRN        And    bisacodyl (DULCOLAX) suppository 10 mg  10 mg Rectal Daily PRN Liz Arizmendi APRN        calcitriol (ROCALTROL) capsule 0.5 mcg  0.5 mcg Oral Daily Liz Arizmendi APRN   0.5 mcg at 04/29/25 0818    carvedilol (COREG) tablet 25 mg  25 mg Oral BID With Meals Liz Arizmendi APRN   25 mg at 04/29/25 0817    cetirizine (zyrTEC) tablet 10 mg  10 mg Oral Daily Liz Arizmendi APRN   10 mg at 04/29/25 0817    cholecalciferol (VITAMIN D3) tablet 5,000 Units  5,000 Units Oral Daily Liz Arizmendi APRN   5,000 Units at 04/29/25 0817    cloNIDine (CATAPRES) tablet 0.3 mg  0.3 mg Oral Q8H Liz Arizmendi APRN   0.3 mg at 04/29/25 0529    [START ON 5/1/2025] cloNIDine (CATAPRES-TTS) 0.2 MG/24HR patch 1 patch  1 patch Transdermal Weekly Liz Arizmendi APRN        clopidogrel (PLAVIX) tablet 75 mg  75 mg Oral Daily Liz Arizmendi APRN   75 mg at 04/29/25 0817    docusate sodium (COLACE) capsule 100 mg  100 mg Oral TID PRN Liz Arizmendi APRN        empagliflozin (JARDIANCE) tablet 10 mg  10 mg Oral Daily Liz Arizmendi APRN   10 mg at 04/29/25 0817    epoetin cecile-epbx (RETACRIT) injection 10,000 Units   10,000 Units Subcutaneous Once per day on Monday Wednesday Friday Giuliano Saldana MD   10,000 Units at 04/28/25 1907    fluticasone (FLONASE) 50 MCG/ACT nasal spray 2 spray  2 spray Nasal Daily Liz Arizmendi APRN   2 spray at 04/29/25 0818    furosemide (LASIX) tablet 40 mg  40 mg Oral Daily Liz Arizmendi APRN   40 mg at 04/29/25 0817    heparin (porcine) injection 3,200 Units  3,200 Units Intracatheter PRN Giuliano Saldana MD   3,200 Units at 04/28/25 1828    hydrALAZINE (APRESOLINE) tablet 100 mg  100 mg Oral TID Liz Arizmendi APRN   100 mg at 04/29/25 0817    isosorbide dinitrate (ISORDIL) tablet 10 mg  10 mg Oral TID - Nitrates Liz Arizmendi APRN   10 mg at 04/29/25 0817    levothyroxine (SYNTHROID, LEVOTHROID) tablet 100 mcg  100 mcg Oral Q AM Liz Arizmendi APRN   100 mcg at 04/29/25 0530    melatonin tablet 10 mg  10 mg Oral Nightly Liz Arizmendi APRN   10 mg at 04/28/25 2116    mesalamine (APRISO) 24 hr capsule 1.5 g  1.5 g Oral Daily Liz Arizmendi APRN   1.5 g at 04/29/25 0817    montelukast (SINGULAIR) tablet 10 mg  10 mg Oral Nightly Liz Arizmendi APRN   10 mg at 04/28/25 2116    NIFEdipine XL (PROCARDIA XL) 24 hr tablet 120 mg  120 mg Oral Daily Liz Arizmendi APRN   120 mg at 04/29/25 0816    pantoprazole (PROTONIX) EC tablet 40 mg  40 mg Oral Q AM Liz Arizmendi APRN   40 mg at 04/29/25 0530    sodium bicarbonate tablet 650 mg  650 mg Oral 5x Daily Liz Arizmendi APRN   650 mg at 04/29/25 0529    sodium chloride 0.9 % flush 10 mL  10 mL Intravenous Q12H Liz Arizmendi APRN   10 mL at 04/29/25 0819    sodium chloride 0.9 % flush 10 mL  10 mL Intravenous PRN Liz Arizmendi APRN        sodium chloride 0.9 % infusion 40 mL  40 mL Intravenous PRN Liz Arizmendi APRN        spironolactone (ALDACTONE) tablet 25 mg  25 mg Oral Daily Liz Arizmendi APRN   25 mg at 04/29/25 0817    tamsulosin (FLOMAX) 24 hr capsule 0.4 mg  0.4 mg Oral Nightly Liz Arizmendi APRN    0.4 mg at 04/28/25 2116    traMADol (ULTRAM) tablet 50 mg  50 mg Oral Q6H PRN Dinesh Camarena MD   50 mg at 04/29/25 0346    valsartan (DIOVAN) tablet 320 mg  320 mg Oral Daily , [place]   320 mg at 04/29/25 0817       Past Medical History:  Past Medical History:   Diagnosis Date    3-vessel CAD 08/11/2020    Allergic rhinitis     Anemia     Anxiety disorder 04/27/2020    Arthritis     Asymmetrical sensorineural hearing loss 06/28/2017    Atherosclerosis of native artery of both lower extremities with intermittent claudication 07/18/2019    Avascular necrosis of femoral head, left 07/11/2020    right hip after surgery    Carotid stenosis     Chronic mucoid otitis media     Chronic rhinitis     COPD (chronic obstructive pulmonary disease)     Coronary artery disease     HEART BYPASS 2004    Crohn's disease of large intestine with other complication 07/30/2020    Chronic diarrhea Colonoscopy July 2020 revealed mild patchy scattered hemosiderin staining with inflammation more so in rectosigmoid area.  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    Iron deficiency anemia 07/14/2020    Mixed hearing loss of left ear     PAD (peripheral artery disease) 08/11/2020    Perianal abscess     Pernicious anemia 08/17/2020    took shots but never diagnosed with b12 deficiency    Personal history of alcoholism 08/11/2020    quit drinking in 2013    Prostatic hypertrophy 08/11/2020    Sensorineural hearing loss     Sepsis with acute renal failure 09/15/2020    Shortness of breath 05/27/2021    Tinnitus     Ventricular tachycardia, nonsustained 07/14/2020     Weight loss 07/11/2020       Past Surgical History:  Past Surgical History:   Procedure Laterality Date    AORTAGRAM Right 4/25/2025    Procedure: RIGHT LOWER EXTREMITY ANGIOGRAM, SHOCKWAVE LITHOTRIPSY, BALLOON ANGIOPLASTY, MYNX CLOSURE;  Surgeon: Gil Pineda DO;  Location: [facility] HYBRID OR;  Service: Vascular;  Laterality: Right;    ARTERY SURGERY  2021    right side on neck    CAROTID ENDARTERECTOMY Right 05/10/2021    Procedure: RIGHT CAROTID ENDARTERECTOMY WITH EEG;  Surgeon: Gil Pineda DO;  Location: [facility] HYBRID OR 12;  Service: Vascular;  Laterality: Right;    COLONOSCOPY N/A 07/02/2020    Procedure: COLONOSCOPY WITH ANESTHESIA;  Surgeon: Adrien Brewster MD;  Location: [facility] ENDOSCOPY;  Service: Gastroenterology;  Laterality: N/A;  pre op: diarrhea  post op: polyps  PCP: Joe Velasco MD    COLONOSCOPY N/A 10/13/2020    Procedure: COLONOSCOPY WITH ANESTHESIA;  Surgeon: Adrien Brewster MD;  Location: [facility] ENDOSCOPY;  Service: Gastroenterology;  Laterality: N/A;  Pre: Chronic Diarrhea, Crohn's  Post: AVM  Dr. Neftali Velasco  CO2 Inflation Used    COLONOSCOPY N/A 12/08/2023    Procedure: COLONOSCOPY WITH ANESTHESIA;  Surgeon: Adrien Brewster MD;  Location: [facility] ENDOSCOPY;  Service: Gastroenterology;  Laterality: N/A;  pre chrone's disease  post sub optimal prep, polyp, chrone's      CORONARY ARTERY BYPASS GRAFT  2003    x3    ENDOSCOPY N/A 11/02/2021    Procedure: ESOPHAGOGASTRODUODENOSCOPY WITH ANESTHESIA;  Surgeon: Bridger Bell MD;  Location: [facility] ENDOSCOPY;  Service: Gastroenterology;  Laterality: N/A;  pre anemia;gi bleed  post  gi bleed;schatski ring  Dr. ERIC Velasco    ENDOSCOPY N/A 10/10/2023    Procedure: ESOPHAGOGASTRODUODENOSCOPY WITH ANESTHESIA;  Surgeon: Adrien Brewster MD;  Location: [facility] ENDOSCOPY;  Service: Gastroenterology;  Laterality: N/A;  preop; anemia  postop esophagitis ; R/O barretts   PCP Randall Beata    EYE SURGERY Bilateral      catorac    INCISION AND DRAINAGE PERIRECTAL ABSCESS N/A 2017    Procedure: INCISION AND DRAINAGE OF JEET ANAL ABSCESS;  Surgeon: Lynette Smith MD;  Location:  [facility] OR;  Service:     INGUINAL HERNIA REPAIR Bilateral 2023    Procedure: INGUINAL HERNIA BILATERAL REPAIR LAPAROSCOPIC WITH DAVINCI ROBOT WITH MESH;  Surgeon: Tahira Rivera MD;  Location:  [facility] OR;  Service: Robotics - DaVinci;  Laterality: Bilateral;    INSERTION HEMODIALYSIS CATHETER N/A 2025    Procedure: HEMODIALYSIS CATHETER PLACEMENT;  Surgeon: Gil Pineda DO;  Location:  [facility] HYBRID OR;  Service: Vascular;  Laterality: N/A;    MYRINGOTOMY W/ TUBES Left 2017    06/10/2016    TONSILLECTOMY      TOTAL HIP ARTHROPLASTY Right        Family History  Family History   Problem Relation Age of Onset    Breast cancer Mother     Dementia Father     Glaucoma Father     No Known Problems Daughter     Colon polyps Neg Hx     Colon cancer Neg Hx        Social History  Social History     Socioeconomic History    Marital status:    Tobacco Use    Smoking status: Former     Current packs/day: 0.00     Average packs/day: 0.5 packs/day for 25.8 years (12.9 ttl pk-yrs)     Types: Cigarettes     Start date:      Quit date: 10/13/2013     Years since quittin.5     Passive exposure: Past    Smokeless tobacco: Never    Tobacco comments:     quit    Vaping Use    Vaping status: Former    Substances: Nicotine    Devices: Pre-filled or refillable cartridge   Substance and Sexual Activity    Alcohol use: Not Currently    Drug use: No    Sexual activity: Not Currently     Partners: Female       Review of Systems:  History obtained from chart review and the patient  General ROS: No fever or chills  Respiratory ROS: No cough, shortness of breath, wheezing  Cardiovascular ROS: No chest pain or palpitations  Gastrointestinal ROS: No abdominal pain or melena  Genito-Urinary ROS: No dysuria or hematuria  Psych ROS: No  "anxiety and depression  14 point ROS reviewed with the patient and negative except as noted above and in the HPI unless unable to obtain.    Objective:  Patient Vitals for the past 24 hrs:   BP Temp Temp src Pulse Resp SpO2 Height Weight   04/29/25 0729 169/56 98.6 °F (37 °C) Oral 51 16 97 % -- --   04/29/25 0438 (!) 183/56 98 °F (36.7 °C) Oral 51 16 96 % -- --   04/28/25 2339 (!) 185/49 98.4 °F (36.9 °C) Oral 56 16 96 % -- --   04/28/25 2122 179/52 -- -- -- -- -- -- --   04/28/25 2038 168/41 99 °F (37.2 °C) Oral 56 18 94 % -- --   04/28/25 1904 (!) 185/48 -- -- 61 18 100 % -- --   04/28/25 1630 (!) 196/71 97.7 °F (36.5 °C) Oral 58 20 -- -- --   04/28/25 1600 176/64 97.6 °F (36.4 °C) Oral 55 20 -- -- --   04/28/25 1545 148/52 97.7 °F (36.5 °C) Oral 55 18 -- -- --   04/28/25 1530 165/53 97.7 °F (36.5 °C) Oral 54 18 -- -- --   04/28/25 1528 149/63 97.7 °F (36.5 °C) Oral 55 18 98 % -- --   04/28/25 1415 153/59 97.6 °F (36.4 °C) Oral 51 16 -- -- --   04/28/25 1228 (!) 153/39 97.6 °F (36.4 °C) Oral 51 16 98 % -- --   04/28/25 1159 -- 97.7 °F (36.5 °C) Oral -- -- -- -- --   04/28/25 1156 139/47 -- -- 52 -- 99 % -- --   04/28/25 1146 130/42 -- -- 51 -- 98 % -- --   04/28/25 1115 124/43 -- -- (!) 49 -- 98 % -- --   04/28/25 1102 145/50 -- -- (!) 49 -- 100 % -- --   04/28/25 1042 142/50 -- -- (!) 48 -- 100 % -- --   04/28/25 1031 133/48 -- -- (!) 48 -- 98 % -- --   04/28/25 1012 132/45 -- -- (!) 48 -- 99 % -- --   04/28/25 1000 128/52 -- -- (!) 47 -- 99 % -- --   04/28/25 0952 120/46 -- -- (!) 48 -- 97 % -- --   04/28/25 0942 -- 98 °F (36.7 °C) Oral (!) 45 19 100 % 182.9 cm (72\") 66.7 kg (147 lb)       Intake/Output Summary (Last 24 hours) at 4/29/2025 0940  Last data filed at 4/29/2025 0926  Gross per 24 hour   Intake 1020 ml   Output 2400 ml   Net -1380 ml     General: awake/alert   Chest:  clear to auscultation bilaterally without respiratory distress  CVS: regular rate and rhythm  Abdominal: soft, nontender, positive " "bowel sounds  Extremities: no cyanosis or edema  Skin: warm and dry without rash      Labs:  Results from last 7 days   Lab Units 04/29/25  0400 04/28/25  1009 04/28/25  0845   WBC 10*3/mm3 5.22 5.46 6.45   HEMOGLOBIN g/dL 9.2* 7.1* 7.1*   HEMATOCRIT % 28.6* 22.7* 23.6*   PLATELETS 10*3/mm3 89* 93* 99*         Results from last 7 days   Lab Units 04/29/25  0400 04/28/25  1009 04/28/25  0845   SODIUM mmol/L 138 135* 136   POTASSIUM mmol/L 4.3 4.8 4.8   CHLORIDE mmol/L 99 98 98   CO2 mmol/L 25.0 22.0 22.0   BUN mg/dL 33* 64* 61*   CREATININE mg/dL 3.31* 4.39* 4.26*   CALCIUM mg/dL 8.4* 8.7 8.8   EGFR mL/min/1.73 18.4* 13.1* 13.6*   BILIRUBIN mg/dL 0.4 0.2  --    ALK PHOS U/L 183* 133*  --    ALT (SGPT) U/L <5 <5  --    AST (SGOT) U/L 25 17  --    GLUCOSE mg/dL 95 102* 116*       Radiology:   Imaging Results (Last 72 Hours)       ** No results found for the last 72 hours. **            Culture:  No results found for: \"BLOODCX\", \"URINECX\", \"WOUNDCX\", \"MRSACX\", \"RESPCX\", \"STOOLCX\"      Assessment    End stage renal disease on HD MWF  Hypertension  Anemia of CKD  Peripheral vascular disease--s/p angiogram/angioplasty on 4/25  Thrombocytopenia     Plan:   Dialysis next due 4/30  OK for discharge from renal standpoint. Follow up will be via dialysis clinic      Slava Tate, APRN  4/29/2025  09:40 CDT    "

## 2025-04-29 NOTE — DISCHARGE SUMMARY
[facility] Medicine Services  DISCHARGE SUMMARY       Date of Admission: 4/28/2025  Date of Discharge:  4/29/2025  Primary Care Physician: Nathan Zimmer MD    Presenting Problem/History of Present Illness:  Right leg pain    Final Discharge Diagnoses:  Active Hospital Problems    Diagnosis     **Anemia, multifactorial to include chronic kidney disease, iron deficiency and possible bleed     ESRD (end stage renal disease) on dialysis     Bradycardia     CLL (chronic lymphocytic leukemia)     Resistant hypertension     PAD (peripheral artery disease)        Consults: Nephrology    Procedures Performed: Hemodialysis    Pertinent Test Results:   Results for orders placed during the hospital encounter of 01/10/25    Adult Transthoracic Echo Complete w/ Color, Spectral and Contrast if Necessary Per Protocol    Interpretation Summary    Left ventricular systolic function is normal. Left ventricular ejection fraction appears to be 56 - 60%.    Left ventricular wall thickness is consistent with mild septal asymmetric hypertrophy.    Left ventricular diastolic function is consistent with (grade II w/high LAP) pseudonormalization.    Normal right ventricular cavity size and systolic function noted.    The left atrial cavity is mild to moderately dilated.    The right atrial cavity is dilated.    Mild aortic valve stenosis is present.    Mild to moderate mitral valve regurgitation is present.    Moderate to severe tricuspid valve regurgitation is present.    Estimated right ventricular systolic pressure from tricuspid regurgitation is markedly elevated (>55 mmHg).      Imaging Results (All)       None          LAB RESULTS:      Lab 04/29/25  0400 04/28/25  1009 04/28/25  0845 04/24/25  0504 04/23/25  0619   WBC 5.22 5.46 6.45 4.20 6.06   HEMOGLOBIN 9.2* 7.1* 7.1* 9.9* 9.6*   HEMATOCRIT 28.6* 22.7* 23.6* 31.5* 30.9*   PLATELETS 89* 93* 99* 100* 124*   NEUTROS ABS 3.63 3.58 4.29  --   3.23   IMMATURE GRANS (ABS) 0.03 0.06* 0.05  --  0.03   LYMPHS ABS 0.67* 0.79 0.93  --  1.49   MONOS ABS 0.57 0.61 0.68  --  0.76   EOS ABS 0.27 0.37 0.43*  --  0.45*   MCV 99.3* 101.8* 103.1* 101.9* 100.7*         Lab 04/29/25  0400 04/28/25  1009 04/28/25  0845 04/25/25  1132 04/24/25  0504   SODIUM 138 135* 136 138 135*   POTASSIUM 4.3 4.8 4.8 3.7 4.2   CHLORIDE 99 98 98 100 99   CO2 25.0 22.0 22.0 28.0 25.0   ANION GAP 14.0 15.0 16.0* 10.0 11.0   BUN 33* 64* 61* 20 36*   CREATININE 3.31* 4.39* 4.26* 1.59* 2.87*   EGFR 18.4* 13.1* 13.6* 44.4* 21.9*   GLUCOSE 95 102* 116* 84 89   CALCIUM 8.4* 8.7 8.8 8.4* 8.5*         Lab 04/29/25  0400 04/28/25  1009   TOTAL PROTEIN 6.2 6.1   ALBUMIN 3.4* 3.6   GLOBULIN 2.8 2.5   ALT (SGPT) <5 <5   AST (SGOT) 25 17   BILIRUBIN 0.4 0.2   ALK PHOS 183* 133*                 Lab 04/28/25  1248 04/28/25  0845 04/24/25  0504   IRON  --  80  --    IRON SATURATION (TSAT)  --  30  --    TIBC  --  265*  --    TRANSFERRIN  --  178*  --    FERRITIN  --  640.10*  --    ABO TYPING O  --  O   RH TYPING Negative  --  Negative   ANTIBODY SCREEN Negative  --  Negative         Brief Urine Lab Results  (Last result in the past 365 days)        Color   Clarity   Blood   Leuk Est   Nitrite   Protein   CREAT   Urine HCG        04/20/25 1509             22.8               Microbiology Results (last 10 days)       Procedure Component Value - Date/Time    Eosinophil Smear - Urine, Urine, Clean Catch [[id number]]  (Normal) Collected: 04/20/25 1509    Lab Status: Final result Specimen: Urine, Clean Catch Updated: 04/21/25 0020     Eosinophil Smear 0 % EOS/100 Cells           Microbiology Results (last 10 days)       Procedure Component Value - Date/Time    Eosinophil Smear - Urine, Urine, Clean Catch [[id number]]  (Normal) Collected: 04/20/25 1509    Lab Status: Final result Specimen: Urine, Clean Catch Updated: 04/21/25 0020     Eosinophil Smear 0 % EOS/100 Cells              Documented weights    04/28/25  0942   Weight: 66.7 kg (147 lb)        Hospital Course: Ricardo Hugo is a 75-year-old male with a past medical history of anxiety, anemia of chronic disease, carotid stenosis, COPD, CABG, Crohn's disease, end-stage renal disease on HD, iron deficiency anemia, hypertension, significant PVD with recent surgical intervention 4/25/2025 see below.  Patient presented to [facility] emergency department 4/28/2025, after he was seen for outpatient infusion at the cancer center.  While getting patient ready for treatment he complained of right leg pain and once nursing staff evaluated his lack of pulses and cold foot along with his labs, they sent him over to the emergency department to be evaluated.  Patient was recently discharged on 4/26/2025 after bilateral angioplasty of the iliac arteries and right femoral artery lithotripsy and balloon angioplasty.  Patient stated that his right leg began aching slightly after he was first discharged coming more more intense and then this morning when he woke up he could not feel his foot and had significant numbness and tingling up his leg.     Assessment by ED physician patient apparently had a arterial occlusive device with balloon inflated still on his femoral artery, which should have been removed prior to discharge. Patient states that he was not told to remove it he was just discharged home with that and to follow-up with Dr. Pineda. Occlusive vise was immediately removed, within approximately 30 minutes patient's pulse was back, able to be dopplered which is his baseline, patient states the pain immediately resolved and he had normal or baseline feeling in that right leg. Case was discussed with his surgeon Dr. Pineda by phone who stated that as long as pulse was back and no pain he could be consulted if needed or any new needs arise.  Patient had no additional complaints during hospitalization, right foot remained warm, Doppler pulse are positive and patient was told to keep  "his current scheduled follow-up with Dr. Pineda.    Additionally patient had missed his HD treatment for that day, patient was evaluated by nephrology and received his scheduled HD treatment during hospitalization.  Patient's Hb was 7.1 upon admission, initiated Retacrit injection and 1 unit PRBC this morning patient tolerated his infusion hemoglobin is stable at 9.2 with hematocrit of 28 and a PLT of 89.  This is closely monitored by STERLING Templeton with hematology, have sent her a note letting her know about admission and infusion as patient receives frequent infusions/injections for her to follow.    Patient has an extensive history of resistant hypertension on a very robust plan.  Patient had several episodes of significant hypertension during hospitalization, upon discharge blood pressure was stable at 140/38. Patient to continue his home regimen of clonidine p.o., clonidine patch weekly, hydralazine, carvedilol, Imdur and Procardia.  Patient is to be seen by his PCP Dr. Zimmer this week encouraged him to discuss this again as Dr. Zimmer has been altering his regimen over the last year.    Patient has reached the maximum benefit of hospitalization and is stable for discharge  Patient has been evaluated today 04/29/25 and is stable for discharge.     Physical Exam on Discharge:  BP (!) 140/38 (BP Location: Left arm, Patient Position: Sitting)   Pulse 50   Temp 98.6 °F (37 °C) (Oral)   Resp 16   Ht 182.9 cm (72\")   Wt 66.7 kg (147 lb)   SpO2 97%   BMI 19.94 kg/m²   Physical Exam  Vitals and nursing note reviewed.   Constitutional:       General: He is not in acute distress.     Appearance: Normal appearance. He is ill-appearing. He is not toxic-appearing.   HENT:      Head: Normocephalic and atraumatic.      Right Ear: Tympanic membrane normal.      Left Ear: Tympanic membrane normal.      Nose: Nose normal.      Mouth/Throat:      Mouth: Mucous membranes are moist.      Pharynx: Oropharynx is clear. "   Eyes:      Pupils: Pupils are equal, round, and reactive to light.   Cardiovascular:      Rate and Rhythm: Normal rate and regular rhythm.      Pulses:           Dorsalis pedis pulses are 1+ on the right side and 2+ on the left side.        Posterior tibial pulses are detected w/ Doppler on the right side and 2+ on the left side.   Pulmonary:      Effort: Pulmonary effort is normal. No respiratory distress.      Breath sounds: Normal breath sounds. No wheezing or rales.   Abdominal:      General: Abdomen is flat. Bowel sounds are normal.      Palpations: Abdomen is soft.   Musculoskeletal:      Cervical back: Normal range of motion. No rigidity or tenderness.      Right lower leg: Edema present.      Left lower leg: No edema.   Skin:     General: Skin is warm and dry.      Capillary Refill: Capillary refill takes less than 2 seconds.   Neurological:      General: No focal deficit present.      Mental Status: He is alert and oriented to person, place, and time.   Psychiatric:         Mood and Affect: Mood normal.         Behavior: Behavior normal.         Thought Content: Thought content normal.         Judgment: Judgment normal.     Condition on Discharge: Stable for discharge home    Discharge Disposition:  Home or Self Care    Discharge Medications:     Discharge Medications        Changes to Medications        Instructions Start Date   cloNIDine 0.3 MG tablet  Commonly known as: CATAPRES  What changed: Another medication with the same name was removed. Continue taking this medication, and follow the directions you see here.   0.3 mg, 3 times daily             Continue These Medications        Instructions Start Date   albuterol sulfate  (90 Base) MCG/ACT inhaler  Commonly known as: PROVENTIL HFA;VENTOLIN HFA;PROAIR HFA   2 puffs, Every 6 Hours PRN      ALPRAZolam 0.25 MG tablet  Commonly known as: XANAX   0.25 mg, Nightly      aspirin 81 MG EC tablet   81 mg, Daily      atorvastatin 10 MG  tablet  Commonly known as: LIPITOR   10 mg, Oral, Daily      Breztri Aerosphere 160-9-4.8 MCG/ACT aerosol inhaler  Generic drug: Budeson-Glycopyrrol-Formoterol   2 puffs, Inhalation, 2 Times Daily PRN      calcitriol 0.5 MCG capsule  Commonly known as: ROCALTROL   0.5 mcg, Oral, Daily      carvedilol 25 MG tablet  Commonly known as: COREG   25 mg, 2 Times Daily With Meals      cholestyramine light 4 g packet   4 g, Oral, Daily      cloNIDine 0.2 MG/24HR patch  Commonly known as: CATAPRES-TTS   1 patch, Weekly      clopidogrel 75 MG tablet  Commonly known as: PLAVIX   75 mg, Daily      Copper Caps 2 MG capsule  Generic drug: Copper Gluconate   1 capsule, Daily      desoximetasone 0.25 % cream  Commonly known as: TOPICORT   1 Application, Topical, 2 Times Daily PRN, irritation      docusate sodium 100 MG capsule  Commonly known as: COLACE   100 mg, 3 Times Daily PRN      fexofenadine 180 MG tablet  Commonly known as: ALLEGRA   180 mg, Daily      fluticasone 50 MCG/ACT nasal spray  Commonly known as: FLONASE   2 sprays, Daily      furosemide 40 MG tablet  Commonly known as: LASIX   40 mg, Oral, Daily      hydrALAZINE 100 MG tablet  Commonly known as: APRESOLINE   100 mg, Oral, 3 Times Daily, 100mg daily      isosorbide dinitrate 10 MG tablet  Commonly known as: ISORDIL   10 mg, Oral, 3 Times Daily (Nitrates)      Jardiance 10 MG tablet tablet  Generic drug: empagliflozin   10 mg, Daily      levothyroxine 100 MCG tablet  Commonly known as: Synthroid   100 mcg, Oral, Every Early Morning      magnesium chloride ER 64 MG DR tablet   64 mg, Daily      melatonin 5 MG tablet tablet   15 mg, Oral, Nightly      mesalamine 0.375 g 24 hr capsule  Commonly known as: APRISO   1.5 g, Oral, Daily      montelukast 10 MG tablet  Commonly known as: SINGULAIR   10 mg, Nightly      NIFEdipine XL 60 MG 24 hr tablet  Commonly known as: PROCARDIA XL   60 mg, Daily      NON FORMULARY   25 mg, Nightly PRN      omeprazole 20 MG  capsule  Commonly known as: priLOSEC   20 mg, Daily      PreserVision/Lutein capsule   1 capsule, 2 times daily      PROCRIT IJ   1 dose, Weekly      sodium bicarbonate 650 MG tablet   650 mg, 5 Times Daily      spironolactone 25 MG tablet  Commonly known as: ALDACTONE   25 mg, Daily      tamsulosin 0.4 MG capsule 24 hr capsule  Commonly known as: FLOMAX   1 capsule, Nightly      traMADol 50 MG tablet  Commonly known as: ULTRAM   50 mg, Oral, Every 6 Hours PRN      valsartan 320 MG tablet  Commonly known as: DIOVAN   320 mg, Oral, Daily      vitamin D 1.25 MG (20603 UT) capsule capsule  Commonly known as: ERGOCALCIFEROL   50,000 Units, Weekly                 Discharge Diet:   Diet Instructions       Diet: Cardiac Diets, Renal Diets; Healthy Heart (2-3 Na+); Regular (IDDSI 7); Thin (IDDSI 0); Low Sodium (2-3g), Low Potassium, Low Phosphorus      Discharge Diet:  Cardiac Diets  Renal Diets       Cardiac Diet: Healthy Heart (2-3 Na+)    Texture: Regular (IDDSI 7)    Fluid Consistency: Thin (IDDSI 0)    Renal Diet:  Low Sodium (2-3g)  Low Potassium  Low Phosphorus               Activity at Discharge:   Activity Instructions       Activity as Tolerated              Discharge Instructions:   1.  Patient was instructed return to medical attention for any new or worsening chest pain, shortness of breath, loss of feeling in right foot or decreased level of consciousness.  2.  Patient was instructed to follow-up with PCP in 1 week as previously scheduled.  3.  Patient was instructed to keep his scheduled follow-up with Dr. Pineda.  4.  Patient was instructed to keep his scheduled HD treatment appointment this week.  5.  Patient was instructed to keep his scheduled follow-up appointment with STERLING Templeton, oncology    Follow-up Appointments:   Future Appointments   Date Time Provider Department Center   6/3/2025  8:30 AM Nathan Zimmer MD [facility] PC VSQ PAD   7/3/2025  2:00 PM Adrien Brewster MD [facility] GE PAD PAD    8/27/2025 10:15 AM Rip Mcguire DO [facility] CD PAD PAD   11/4/2025 10:00 AM PAD US NIVAS CART 1 BH PAD NIVAS PAD   11/4/2025 11:00 AM PAD US NIVAS CART 1 BH PAD NIVAS PAD   11/6/2025 10:15 AM Elena Jon APRN [facility] VS PAD PAD       Test Results Pending at Discharge: None    Electronically signed by MICHELLE WatkinsBC, 04/29/25, 10:46 CDT.    Time: 35 minutes.

## 2025-04-29 NOTE — PLAN OF CARE
Goal Outcome Evaluation:           Progress: improving    Goals met. Patient being discharged to home.  BP levels from today reviewed by APRN.  Safety maintained. Verbalizes understanding of discharge instructions.

## 2025-04-29 NOTE — OUTREACH NOTE
Prep Survey      Flowsheet Row Responses   [facility] patient discharged from? [place]   Is LACE score < 7 ? No   Eligibility [facility]   Date of Admission 04/28/25   Date of Discharge 04/29/25   Discharge diagnosis Anemia   Does the patient have one of the following disease processes/diagnoses(primary or secondary)? Other   Prep survey completed? Yes            Lyssa DUMAS - Registered Nurse

## 2025-04-29 NOTE — PLAN OF CARE
Goal Outcome Evaluation:  Plan of Care Reviewed With: patient        Progress: no change  Outcome Evaluation: Pt a/o x 4, maintaining oxygen sats on room air. BP elevated. Medicated for pain per MAR. No issues overnight. Call light within reach. Safety maintained.

## 2025-04-30 ENCOUNTER — TRANSITIONAL CARE MANAGEMENT TELEPHONE ENCOUNTER (OUTPATIENT)
Dept: CALL CENTER | Facility: HOSPITAL | Age: 75
End: 2025-04-30
Payer: MEDICARE

## 2025-04-30 NOTE — OUTREACH NOTE
Call Center TCM Note      Flowsheet Row Responses   [facility] patient discharged from? [place]   Does the patient have one of the following disease processes/diagnoses(primary or secondary)? Other   TCM attempt successful? Yes   Call start time 1531   Call end time 1532   Discharge diagnosis Anemia   Meds reviewed with patient/caregiver? Yes   Is the patient having any side effects they believe may be caused by any medication additions or changes? No   Does the patient have all medications ordered at discharge? Yes   Is the patient taking all medications as directed (includes completed medication regime)? Yes   Does the patient have an appointment with their PCP within 7-14 days of discharge? No   Nursing Interventions Patient declined scheduling/rescheduling appointment at this time   Has home health visited the patient within 72 hours of discharge? N/A   Did the patient receive a copy of their discharge instructions? Yes   Nursing interventions Reviewed instructions with patient   What is the patient's perception of their health status since discharge? Improving   Is the patient/caregiver able to teach back signs and symptoms related to disease process for when to call PCP? Yes   Is the patient/caregiver able to teach back signs and symptoms related to disease process for when to call 911? Yes   Is the patient/caregiver able to teach back the hierarchy of who to call/visit for symptoms/problems? PCP, Specialist, Home health nurse, Urgent Care, ED, 911 Yes   If the patient is a current smoker, are they able to teach back resources for cessation? Not a smoker   TCM call completed? Yes   Call end time 1532            Salma JIN - Registered Nurse    4/30/2025, 15:32 CDT

## 2025-05-06 DIAGNOSIS — N18.6 ESRD (END STAGE RENAL DISEASE): ICD-10-CM

## 2025-05-06 RX ORDER — TRAMADOL HYDROCHLORIDE 50 MG/1
50 TABLET ORAL EVERY 6 HOURS PRN
Qty: 30 TABLET | Refills: 0 | Status: SHIPPED | OUTPATIENT
Start: 2025-05-06

## 2025-05-09 DIAGNOSIS — F41.1 GAD (GENERALIZED ANXIETY DISORDER): ICD-10-CM

## 2025-05-09 DIAGNOSIS — G47.9 SLEEP DIFFICULTIES: Primary | ICD-10-CM

## 2025-05-09 RX ORDER — ALPRAZOLAM 0.25 MG
0.25 TABLET ORAL NIGHTLY
Qty: 90 TABLET | Refills: 1 | Status: SHIPPED | OUTPATIENT
Start: 2025-05-09

## 2025-05-12 ENCOUNTER — READMISSION MANAGEMENT (OUTPATIENT)
Dept: CALL CENTER | Facility: HOSPITAL | Age: 75
End: 2025-05-12
Payer: MEDICARE

## 2025-05-12 NOTE — OUTREACH NOTE
Medical Week 2 Survey      Flowsheet Row Responses   [facility] patient discharged from? [place]   Does the patient have one of the following disease processes/diagnoses(primary or secondary)? Other   Week 2 attempt successful? No   Unsuccessful attempts Attempt 1   Revoke Haydee Pleitez Registered Nurse

## 2025-05-13 ENCOUNTER — OFFICE VISIT (OUTPATIENT)
Dept: INTERNAL MEDICINE | Facility: CLINIC | Age: 75
End: 2025-05-13
Payer: MEDICARE

## 2025-05-13 VITALS
TEMPERATURE: 97.9 F | HEART RATE: 47 BPM | WEIGHT: 146 LBS | SYSTOLIC BLOOD PRESSURE: 106 MMHG | DIASTOLIC BLOOD PRESSURE: 42 MMHG | HEIGHT: 72 IN | OXYGEN SATURATION: 96 % | BODY MASS INDEX: 19.77 KG/M2

## 2025-05-13 DIAGNOSIS — L05.91 CHRONIC RECURRENT PILONIDAL CYST WITHOUT ABSCESS: Primary | ICD-10-CM

## 2025-05-13 NOTE — PROGRESS NOTES
"Chief Complaint  Wound Check (Has a \"saddle sore\" at the end of his tail bone that he has had for 6-8 months.  Having dialysis and painful  to sit that long)    Subjective        Ricardo Hugo is a 75 y.o. male who presents today for evaluation of the above problems.      History of Present Illness  The patient is a 75-year-old male who presents for evaluation of a sore on his tailbone.    He reports the presence of a sore on his tailbone, which feels like a previous pilonidal cyst he has had. He reports pain for the past six weeks which has progressively worsened but over the past week started dialysis three times per week and the pain has worsened significantly. He denies drainage or fever like symptoms. Does have pain with both sitting and walking. The last flare he had required surgical removal several years ago with Dr. Lynette Smith. He is using a donut pillow without relief.       Review of Systems - Negative except as noted per HPI  History obtained from the patient    Objective   Vital Signs:  /42 (BP Location: Left arm, Patient Position: Sitting, Cuff Size: Adult)   Pulse (!) 47   Temp 97.9 °F (36.6 °C) (Temporal)   Ht 182.9 cm (72.01\")   Wt 66.2 kg (146 lb)   SpO2 96%   BMI 19.80 kg/m²   Estimated body mass index is 19.8 kg/m² as calculated from the following:    Height as of this encounter: 182.9 cm (72.01\").    Weight as of this encounter: 66.2 kg (146 lb).           Physical Exam  Constitutional:       Appearance: Normal appearance.   Pulmonary:      Effort: Pulmonary effort is normal.   Musculoskeletal:        Legs:    Skin:     General: Skin is warm and dry.      Capillary Refill: Capillary refill takes less than 2 seconds.   Neurological:      General: No focal deficit present.      Mental Status: He is alert and oriented to person, place, and time.   Psychiatric:         Mood and Affect: Mood normal.         Behavior: Behavior normal.         Thought Content: Thought content normal.  "        Judgment: Judgment normal.          Result Review :                 Assessment and Plan   Diagnoses and all orders for this visit:    1. Chronic recurrent pilonidal cyst without abscess (Primary)  -     Ambulatory Referral to General Surgery      - The patient reports a painful sore on the tailbone, present for approximately six months.  - Physical examination reveals no visible pocket of infection or drainage.  - Discussed the condition and the absence of signs necessitating antibiotic treatment at this time.  - A referral to Dr. Rivera will be placed today for further evaluation and potential excision. The patient should expect a call within a few days to schedule the appointment.  -Continue using donut pillow   -For signs of infection or worsening prior to consultation appointment with general surgery either contact the office or seek emergency care       I will be providing care over continum for this patient including management of both chronic and acute medical conditions       Follow Up   Return if symptoms worsen or fail to improve.  Patient was given instructions and counseling regarding his condition or for health maintenance advice. Please see specific information pulled into the AVS if appropriate.       Patient or patient representative verbalized consent for the use of Ambient Listening during the visit with  STERLING Orellana for chart documentation. 5/13/2025  08:44 CDT

## 2025-05-19 ENCOUNTER — OFFICE VISIT (OUTPATIENT)
Dept: SURGERY | Facility: CLINIC | Age: 75
End: 2025-05-19
Payer: MEDICARE

## 2025-05-19 VITALS
BODY MASS INDEX: 18.42 KG/M2 | HEART RATE: 77 BPM | SYSTOLIC BLOOD PRESSURE: 118 MMHG | HEIGHT: 72 IN | DIASTOLIC BLOOD PRESSURE: 60 MMHG | WEIGHT: 136 LBS | OXYGEN SATURATION: 97 %

## 2025-05-19 DIAGNOSIS — L05.91 CHRONIC RECURRENT PILONIDAL CYST: Primary | ICD-10-CM

## 2025-05-19 NOTE — PROGRESS NOTES
Office New Patient History and Physical:     Referring Provider: Brenda Benoit APRN    Chief Complaint   Patient presents with    Pilonidal Cyst        Subjective .     History of present illness:    History of Present Illness  The patient presents for evaluation of a saddle sore.    Discomfort from the saddle sore has been ongoing for the past 3 to 4 months. The sore is not currently draining, but it is causing pain. No antibiotic treatment has been sought for this condition. Approximately 8 to 9 years ago, a successful pilonidal cystectomy was performed by Dr. Smith.    Currently, he is on anticoagulant therapy with Plavix and had a stent placed about a month ago. Additionally, a hemodialysis catheter was inserted by Dr. Pineda, and he is undergoing dialysis.    He has had a significant weight loss of 40 pounds occurred, dropping from 170 to 135 pounds. Protein shakes such as Boost or Ensure are not consumed.    PAST SURGICAL HISTORY:  Pilonidal cystectomy approximately 8 to 9 years ago.  Stent placement about a month ago.  Hemodialysis catheter insertion recently.       History  Past Medical History:   Diagnosis Date    3-vessel CAD 08/11/2020    Allergic rhinitis     Anemia     Anxiety disorder 04/27/2020    Arthritis     Asymmetrical sensorineural hearing loss 06/28/2017    Atherosclerosis of native artery of both lower extremities with intermittent claudication 07/18/2019    Avascular necrosis of femoral head, left 07/11/2020    right hip after surgery    Carotid stenosis     Chronic mucoid otitis media     Chronic rhinitis     COPD (chronic obstructive pulmonary disease)     Coronary artery disease     HEART BYPASS 2004    Crohn's disease of large intestine with other complication 07/30/2020    Chronic diarrhea Colonoscopy July 2020 revealed mild patchy scattered hemosiderin staining with inflammation more so in rectosigmoid area.  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    Iron deficiency anemia 07/14/2020    Mixed hearing loss of left ear     PAD (peripheral artery disease) 08/11/2020    Perianal abscess     Pernicious anemia 08/17/2020    took shots but never diagnosed with b12 deficiency    Personal history of alcoholism 08/11/2020    quit drinking in 2013    Prostatic hypertrophy 08/11/2020    Sensorineural hearing loss     Sepsis with acute renal failure 09/15/2020    Shortness of breath 05/27/2021    Tinnitus     Ventricular tachycardia, nonsustained 07/14/2020    Weight loss 07/11/2020   ,   Past Surgical History:   Procedure Laterality Date    AORTOGRAM Right 4/25/2025    Procedure: RIGHT LOWER EXTREMITY ANGIOGRAM, SHOCKWAVE LITHOTRIPSY, BALLOON ANGIOPLASTY, MYNX CLOSURE;  Surgeon: Gil Pineda DO;  Location: [facility] HYBRID OR;  Service: Vascular;  Laterality: Right;    ARTERY SURGERY  2021    right side on neck    CAROTID ENDARTERECTOMY Right 05/10/2021    Procedure: RIGHT CAROTID ENDARTERECTOMY WITH EEG;  Surgeon: Gil Pineda DO;  Location: [facility] HYBRID OR 12;  Service: Vascular;  Laterality: Right;    COLONOSCOPY N/A 07/02/2020    Procedure: COLONOSCOPY WITH ANESTHESIA;  Surgeon: Adrien Brewster MD;  Location: [facility] ENDOSCOPY;  Service: Gastroenterology;  Laterality: N/A;  pre op: diarrhea  post op: polyps  PCP: Joe Velasco MD    COLONOSCOPY N/A 10/13/2020    Procedure: COLONOSCOPY WITH ANESTHESIA;  Surgeon: Adrien Brewster MD;  Location: [facility] ENDOSCOPY;  Service: Gastroenterology;  Laterality: N/A;  Pre: Chronic Diarrhea, Crohn's  Post: AVM  Dr. Neftali Velasco  CO2 Inflation Used    COLONOSCOPY N/A 12/08/2023     Procedure: COLONOSCOPY WITH ANESTHESIA;  Surgeon: Adrien Brewster MD;  Location: [facility] ENDOSCOPY;  Service: Gastroenterology;  Laterality: N/A;  pre chrone's disease  post sub optimal prep, polyp, chrone's      CORONARY ARTERY BYPASS GRAFT  2003    x3    ENDOSCOPY N/A 11/02/2021    Procedure: ESOPHAGOGASTRODUODENOSCOPY WITH ANESTHESIA;  Surgeon: Bridger Bell MD;  Location: [facility] ENDOSCOPY;  Service: Gastroenterology;  Laterality: N/A;  pre anemia;gi bleed  post  gi bleed;schatski ring  Dr. ERIC Velasco    ENDOSCOPY N/A 10/10/2023    Procedure: ESOPHAGOGASTRODUODENOSCOPY WITH ANESTHESIA;  Surgeon: Adrien Brewster MD;  Location: [facility] ENDOSCOPY;  Service: Gastroenterology;  Laterality: N/A;  preop; anemia  postop esophagitis ; R/O barretts   PCP Randall Beata    EYE SURGERY Bilateral     catorac    INCISION AND DRAINAGE PERIRECTAL ABSCESS N/A 03/03/2017    Procedure: INCISION AND DRAINAGE OF JEET ANAL ABSCESS;  Surgeon: Lynette Smith MD;  Location: [facility] OR;  Service:     INGUINAL HERNIA REPAIR Bilateral 06/27/2023    Procedure: INGUINAL HERNIA BILATERAL REPAIR LAPAROSCOPIC WITH DAVINCI ROBOT WITH MESH;  Surgeon: Tahira Rivera MD;  Location: [facility] OR;  Service: Robotics - DaVinci;  Laterality: Bilateral;    INSERTION HEMODIALYSIS CATHETER N/A 4/16/2025    Procedure: HEMODIALYSIS CATHETER PLACEMENT;  Surgeon: Gil Pineda DO;  Location: [facility] HYBRID OR;  Service: Vascular;  Laterality: N/A;    MYRINGOTOMY W/ TUBES Left 04/17/2017    06/10/2016    TONSILLECTOMY      TOTAL HIP ARTHROPLASTY Right 2006   ,   Family History   Problem Relation Age of Onset    Breast cancer Mother     Dementia Father     Glaucoma Father     No Known Problems Daughter     Colon polyps Neg Hx     Colon cancer Neg Hx    ,   Social History     Tobacco Use    Smoking status: Former     Current packs/day: 0.00     Average packs/day: 0.5 packs/day for 25.8 years (12.9 ttl pk-yrs)     Types: Cigarettes      Start date:      Quit date: 10/13/2013     Years since quittin.6     Passive exposure: Past    Smokeless tobacco: Never    Tobacco comments:     quit 2013   Vaping Use    Vaping status: Former    Substances: Nicotine    Devices: Pre-filled or refillable cartridge   Substance Use Topics    Alcohol use: Not Currently    Drug use: No   , (Not in a hospital admission)   and Allergies:  Ondansetron, Zofran [ondansetron hcl], Lortab [hydrocodone-acetaminophen], and Allopurinol    Current Outpatient Medications:     albuterol sulfate  (90 Base) MCG/ACT inhaler, Inhale 2 puffs Every 6 (Six) Hours As Needed for Wheezing or Shortness of Air., Disp: , Rfl:     ALPRAZolam (XANAX) 0.25 MG tablet, TAKE 1 TABLET BY MOUTH EVERY NIGHT, Disp: 90 tablet, Rfl: 1    aspirin (aspirin) 81 MG EC tablet, Take 1 tablet by mouth Daily., Disp: , Rfl:     atorvastatin (LIPITOR) 10 MG tablet, Take 1 tablet by mouth Daily., Disp: 90 tablet, Rfl: 3    Budeson-Glycopyrrol-Formoterol (Breztri Aerosphere) 160-9-4.8 MCG/ACT aerosol inhaler, Inhale 2 puffs 2 (Two) Times a Day As Needed., Disp: , Rfl:     calcitriol (ROCALTROL) 0.5 MCG capsule, Take 1 capsule by mouth Daily., Disp: 90 capsule, Rfl: 2    carvedilol (COREG) 25 MG tablet, Take 1 tablet by mouth 2 (Two) Times a Day With Meals., Disp: , Rfl:     cholestyramine light 4 g packet, Take 1 packet by mouth Daily., Disp: 90 packet, Rfl: 1    cloNIDine (CATAPRES) 0.3 MG tablet, Take 1 tablet by mouth 3 times a day., Disp: , Rfl:     cloNIDine (CATAPRES-TTS) 0.2 MG/24HR patch, Place 1 patch on the skin as directed by provider 1 (One) Time Per Week. , Disp: , Rfl:     clopidogrel (PLAVIX) 75 MG tablet, Take 1 tablet by mouth Daily., Disp: , Rfl:     Copper Gluconate (Copper Caps) 2 MG capsule, Take 2 mg by mouth Daily., Disp: , Rfl:     desoximetasone (TOPICORT) 0.25 % cream, Apply 1 Application topically to the appropriate area as directed 2 (Two) Times a Day As Needed for  Irritation. irritation, Disp: , Rfl:     docusate sodium (COLACE) 100 MG capsule, Take 1 capsule by mouth 3 (Three) Times a Day As Needed for Constipation., Disp: , Rfl:     fexofenadine (ALLEGRA) 180 MG tablet, Take 1 tablet by mouth Daily., Disp: , Rfl:     fluticasone (FLONASE) 50 MCG/ACT nasal spray, Administer 2 sprays into the nostril(s) as directed by provider Daily., Disp: , Rfl:     furosemide (LASIX) 40 MG tablet, Take 1 tablet by mouth Daily., Disp: 90 tablet, Rfl: 2    hydrALAZINE (APRESOLINE) 100 MG tablet, Take 1 tablet by mouth 3 (Three) Times a Day. 100mg daily, Disp: , Rfl:     isosorbide dinitrate (ISORDIL) 10 MG tablet, Take 1 tablet by mouth 3 (Three) Times a Day., Disp: 270 tablet, Rfl: 2    levothyroxine (Synthroid) 100 MCG tablet, Take 1 tablet by mouth Every Morning., Disp: 90 tablet, Rfl: 2    magnesium chloride ER 64 MG DR tablet, Take 1 tablet by mouth Daily., Disp: , Rfl:     melatonin 5 MG tablet tablet, Take 3 tablets by mouth Every Night., Disp: , Rfl:     mesalamine (APRISO) 0.375 g 24 hr capsule, Take 4 capsules by mouth Daily., Disp: 360 capsule, Rfl: 1    montelukast (SINGULAIR) 10 MG tablet, Take 1 tablet by mouth Every Night., Disp: , Rfl:     Multiple Vitamins-Minerals (PRESERVISION/LUTEIN) capsule, Take 1 capsule by mouth 2 (two) times a day., Disp: , Rfl:     NIFEdipine XL (PROCARDIA XL) 60 MG 24 hr tablet, Take 1 tablet by mouth Daily. (Patient taking differently: Take 2 tablets by mouth Daily.), Disp: , Rfl:     NON FORMULARY, Take 25 mg by mouth At Night As Needed. Zzzquill, Disp: , Rfl:     omeprazole (priLOSEC) 20 MG capsule, Take 1 capsule by mouth Daily., Disp: , Rfl:     sodium bicarbonate 650 MG tablet, Take 1 tablet by mouth 5 (Five) Times a Day., Disp: , Rfl:     spironolactone (ALDACTONE) 25 MG tablet, Take 1 tablet by mouth Daily., Disp: , Rfl:     tamsulosin (FLOMAX) 0.4 MG capsule 24 hr capsule, Take 1 capsule by mouth Every Night., Disp: , Rfl:     traMADol  "(ULTRAM) 50 MG tablet, Take 1 tablet by mouth Every 6 (Six) Hours As Needed for Moderate Pain., Disp: 30 tablet, Rfl: 0    valsartan (DIOVAN) 320 MG tablet, Take 1 tablet by mouth Daily., Disp: , Rfl:     vitamin D (ERGOCALCIFEROL) 1.25 MG (07207 UT) capsule capsule, Take 1 capsule by mouth 1 (One) Time Per Week., Disp: , Rfl:     empagliflozin (Jardiance) 10 MG tablet tablet, Take 1 tablet by mouth Daily. (Patient not taking: Reported on 5/19/2025), Disp: , Rfl:     Epoetin Anselmo (PROCRIT IJ), Inject 1 dose as directed 1 (One) Time Per Week. Monday (Patient not taking: Reported on 5/19/2025), Disp: , Rfl:     Current Facility-Administered Medications:     cyanocobalamin injection 1,000 mcg, 1,000 mcg, Intramuscular, Q28 Days, Ruthie Parra APRN, 1,000 mcg at 08/11/23 1123        Objective     Vital Signs   /60 (BP Location: Left arm, Patient Position: Sitting, Cuff Size: Adult)   Pulse 77   Ht 182.9 cm (72.01\")   Wt 61.7 kg (136 lb)   SpO2 97%   BMI 18.44 kg/m²      Physical Exam:  General appearance - underweight  Mental status - alert, oriented to person, place, and time  Eyes - pupils equal and reactive, extraocular eye movements intact  Neurological - alert, oriented, normal speech, no focal findings or movement disorder noted  Skin - S/p pilonidal cystectomy. Small tract. No redness, no signs of infection.     Physical Exam       Results Review:    The following data was reviewed by: Tahira Rivera MD on 05/19/2025:  Progress Notes by Brenda Benoit APRN (05/13/2025 09:00)     Assessment & Plan       Diagnoses and all orders for this visit:    1. Chronic recurrent pilonidal cyst (Primary)       Assessment & Plan  1. Saddle sore.  The primary issue appears to be irritation from bone friction due to a lack of cushioning in the area. There are no signs of infection or redness. Advised to consume two protein shakes daily to increase caloric intake and promote weight gain. Recommended " protein shakes. To alleviate pressure on the affected area, avoid sitting directly on it and use a donut cushion during dialysis sessions. When lying down, position on sides or stomach rather than back. If the sore begins to drain or shows signs of infection, contact the office immediately.       BMI is below normal parameters (malnutrition). Recommendations: none (medical contraindication)      Tahira Rivera MD  05/19/25  14:55 CDT      Patient or patient representative verbalized consent for the use of Ambient Listening during the visit with  Tahira Rivera MD for chart documentation. 5/20/2025  20:02 CDT

## 2025-05-21 ENCOUNTER — TELEPHONE (OUTPATIENT)
Dept: INTERNAL MEDICINE | Facility: CLINIC | Age: 75
End: 2025-05-21
Payer: MEDICARE

## 2025-05-21 ENCOUNTER — TELEPHONE (OUTPATIENT)
Dept: VASCULAR SURGERY | Facility: CLINIC | Age: 75
End: 2025-05-21

## 2025-05-21 ENCOUNTER — APPOINTMENT (OUTPATIENT)
Dept: ULTRASOUND IMAGING | Facility: HOSPITAL | Age: 75
End: 2025-05-21
Payer: MEDICARE

## 2025-05-21 ENCOUNTER — HOSPITAL ENCOUNTER (INPATIENT)
Facility: HOSPITAL | Age: 75
LOS: 15 days | Discharge: NURSING, REHAB OR LONG-TERM CARE | End: 2025-06-05
Attending: INTERNAL MEDICINE | Admitting: FAMILY MEDICINE
Payer: MEDICARE

## 2025-05-21 DIAGNOSIS — K40.20 BILATERAL INGUINAL HERNIA WITHOUT OBSTRUCTION OR GANGRENE, RECURRENCE NOT SPECIFIED: ICD-10-CM

## 2025-05-21 DIAGNOSIS — I51.89 DIASTOLIC DYSFUNCTION: ICD-10-CM

## 2025-05-21 DIAGNOSIS — L30.8 DERMATITIS ASSOCIATED WITH MOISTURE: ICD-10-CM

## 2025-05-21 DIAGNOSIS — I65.21 CAROTID STENOSIS, RIGHT: ICD-10-CM

## 2025-05-21 DIAGNOSIS — D63.1 ANEMIA DUE TO CHRONIC KIDNEY DISEASE, ON CHRONIC DIALYSIS: ICD-10-CM

## 2025-05-21 DIAGNOSIS — Z79.01 ANTICOAGULATED: ICD-10-CM

## 2025-05-21 DIAGNOSIS — C91.10 CLL (CHRONIC LYMPHOCYTIC LEUKEMIA): ICD-10-CM

## 2025-05-21 DIAGNOSIS — J34.3 HYPERTROPHY OF INFERIOR NASAL TURBINATE: ICD-10-CM

## 2025-05-21 DIAGNOSIS — Z00.00 WELLNESS EXAMINATION: ICD-10-CM

## 2025-05-21 DIAGNOSIS — I25.9 IHD (ISCHEMIC HEART DISEASE): ICD-10-CM

## 2025-05-21 DIAGNOSIS — R01.1 SYSTOLIC MURMUR: Chronic | ICD-10-CM

## 2025-05-21 DIAGNOSIS — J34.2 NASAL SEPTAL DEVIATION: ICD-10-CM

## 2025-05-21 DIAGNOSIS — K40.91 UNILATERAL RECURRENT INGUINAL HERNIA WITHOUT OBSTRUCTION OR GANGRENE: ICD-10-CM

## 2025-05-21 DIAGNOSIS — I73.9 PERIPHERAL ARTERIAL DISEASE: ICD-10-CM

## 2025-05-21 DIAGNOSIS — E78.2 MIXED HYPERLIPIDEMIA: ICD-10-CM

## 2025-05-21 DIAGNOSIS — N18.6 ANEMIA DUE TO CHRONIC KIDNEY DISEASE, ON CHRONIC DIALYSIS: ICD-10-CM

## 2025-05-21 DIAGNOSIS — M79.605 ACUTE PAIN OF LEFT LOWER EXTREMITY: ICD-10-CM

## 2025-05-21 DIAGNOSIS — I70.90 ARTERIAL OCCLUSION: Primary | ICD-10-CM

## 2025-05-21 DIAGNOSIS — I10 ESSENTIAL HYPERTENSION: ICD-10-CM

## 2025-05-21 DIAGNOSIS — I65.23 BILATERAL CAROTID ARTERY STENOSIS: ICD-10-CM

## 2025-05-21 DIAGNOSIS — J31.0 CHRONIC RHINITIS: ICD-10-CM

## 2025-05-21 DIAGNOSIS — Z99.2 ESRD (END STAGE RENAL DISEASE) ON DIALYSIS: ICD-10-CM

## 2025-05-21 DIAGNOSIS — N18.4 CKD (CHRONIC KIDNEY DISEASE) STAGE 4, GFR 15-29 ML/MIN: ICD-10-CM

## 2025-05-21 DIAGNOSIS — H69.93 EUSTACHIAN TUBE DYSFUNCTION, BILATERAL: ICD-10-CM

## 2025-05-21 DIAGNOSIS — Z99.2 DIALYSIS PATIENT: ICD-10-CM

## 2025-05-21 DIAGNOSIS — G47.9 SLEEP DIFFICULTIES: ICD-10-CM

## 2025-05-21 DIAGNOSIS — D64.9 SYMPTOMATIC ANEMIA: ICD-10-CM

## 2025-05-21 DIAGNOSIS — D69.6 THROMBOCYTOPENIA: ICD-10-CM

## 2025-05-21 DIAGNOSIS — H90.3 SENSORINEURAL HEARING LOSS (SNHL), BILATERAL: ICD-10-CM

## 2025-05-21 DIAGNOSIS — R00.1 BRADYCARDIA: ICD-10-CM

## 2025-05-21 DIAGNOSIS — N18.6 ESRD (END STAGE RENAL DISEASE) ON DIALYSIS: ICD-10-CM

## 2025-05-21 DIAGNOSIS — I65.21 STENOSIS OF RIGHT CAROTID ARTERY: ICD-10-CM

## 2025-05-21 DIAGNOSIS — H72.92 PERFORATION OF LEFT TYMPANIC MEMBRANE: ICD-10-CM

## 2025-05-21 DIAGNOSIS — I50.32 CHRONIC DIASTOLIC (CONGESTIVE) HEART FAILURE: ICD-10-CM

## 2025-05-21 DIAGNOSIS — I1A.0 RESISTANT HYPERTENSION: ICD-10-CM

## 2025-05-21 DIAGNOSIS — D64.9 ANEMIA, UNSPECIFIED TYPE: ICD-10-CM

## 2025-05-21 DIAGNOSIS — R80.1 PERSISTENT PROTEINURIA: ICD-10-CM

## 2025-05-21 DIAGNOSIS — K52.9 CHRONIC DIARRHEA: ICD-10-CM

## 2025-05-21 DIAGNOSIS — E43 SEVERE PROTEIN-CALORIE MALNUTRITION: ICD-10-CM

## 2025-05-21 DIAGNOSIS — E61.0 COPPER DEFICIENCY: ICD-10-CM

## 2025-05-21 DIAGNOSIS — Z99.2 ANEMIA DUE TO CHRONIC KIDNEY DISEASE, ON CHRONIC DIALYSIS: ICD-10-CM

## 2025-05-21 DIAGNOSIS — R79.89 ABNORMAL TSH: ICD-10-CM

## 2025-05-21 DIAGNOSIS — Z74.09 IMPAIRED MOBILITY: ICD-10-CM

## 2025-05-21 DIAGNOSIS — Z87.01 HISTORY OF PNEUMONIA: ICD-10-CM

## 2025-05-21 DIAGNOSIS — Z01.818 PREOP TESTING: ICD-10-CM

## 2025-05-21 DIAGNOSIS — E61.1 LOW IRON: ICD-10-CM

## 2025-05-21 DIAGNOSIS — E87.5 HYPERKALEMIA: ICD-10-CM

## 2025-05-21 LAB
ABO GROUP BLD: NORMAL
ALBUMIN SERPL-MCNC: 3.6 G/DL (ref 3.5–5.2)
ALBUMIN/GLOB SERPL: 1.6 G/DL
ALP SERPL-CCNC: 110 U/L (ref 39–117)
ALT SERPL W P-5'-P-CCNC: 5 U/L (ref 1–41)
ANION GAP SERPL CALCULATED.3IONS-SCNC: 12 MMOL/L (ref 5–15)
ANISOCYTOSIS BLD QL: ABNORMAL
APTT PPP: 31.3 SECONDS (ref 24.5–36)
APTT PPP: 32.7 SECONDS (ref 24.5–36)
AST SERPL-CCNC: 18 U/L (ref 1–40)
BASOPHILS # BLD MANUAL: 0 10*3/MM3 (ref 0–0.2)
BASOPHILS NFR BLD MANUAL: 0 % (ref 0–1.5)
BILIRUB SERPL-MCNC: 0.2 MG/DL (ref 0–1.2)
BLD GP AB SCN SERPL QL: NEGATIVE
BUN SERPL-MCNC: 14 MG/DL (ref 8–23)
BUN/CREAT SERPL: 6.1 (ref 7–25)
CALCIUM SPEC-SCNC: 8.5 MG/DL (ref 8.6–10.5)
CHLORIDE SERPL-SCNC: 95 MMOL/L (ref 98–107)
CO2 SERPL-SCNC: 30 MMOL/L (ref 22–29)
CREAT SERPL-MCNC: 2.29 MG/DL (ref 0.76–1.27)
DEPRECATED RDW RBC AUTO: 66.4 FL (ref 37–54)
EGFRCR SERPLBLD CKD-EPI 2021: 28.7 ML/MIN/1.73
EOSINOPHIL # BLD MANUAL: 0.24 10*3/MM3 (ref 0–0.4)
EOSINOPHIL NFR BLD MANUAL: 6.3 % (ref 0.3–6.2)
ERYTHROCYTE [DISTWIDTH] IN BLOOD BY AUTOMATED COUNT: 17.8 % (ref 12.3–15.4)
GLOBULIN UR ELPH-MCNC: 2.3 GM/DL
GLUCOSE SERPL-MCNC: 99 MG/DL (ref 65–99)
HCT VFR BLD AUTO: 22.2 % (ref 37.5–51)
HGB BLD-MCNC: 7.3 G/DL (ref 13–17.7)
HYPOCHROMIA BLD QL: ABNORMAL
INR PPP: 1.25 (ref 0.91–1.09)
LYMPHOCYTES # BLD MANUAL: 0.55 10*3/MM3 (ref 0.7–3.1)
LYMPHOCYTES NFR BLD MANUAL: 6.3 % (ref 5–12)
MACROCYTES BLD QL SMEAR: ABNORMAL
MCH RBC QN AUTO: 33.2 PG (ref 26.6–33)
MCHC RBC AUTO-ENTMCNC: 32.9 G/DL (ref 31.5–35.7)
MCV RBC AUTO: 100.9 FL (ref 79–97)
MONOCYTES # BLD: 0.24 10*3/MM3 (ref 0.1–0.9)
NEUTROPHILS # BLD AUTO: 2.78 10*3/MM3 (ref 1.7–7)
NEUTROPHILS NFR BLD MANUAL: 72.9 % (ref 42.7–76)
PLATELET # BLD AUTO: 83 10*3/MM3 (ref 140–450)
PMV BLD AUTO: 9.8 FL (ref 6–12)
POTASSIUM SERPL-SCNC: 3.1 MMOL/L (ref 3.5–5.2)
PROT SERPL-MCNC: 5.9 G/DL (ref 6–8.5)
PROTHROMBIN TIME: 16.4 SECONDS (ref 11.8–14.8)
RBC # BLD AUTO: 2.2 10*6/MM3 (ref 4.14–5.8)
RH BLD: NEGATIVE
SMALL PLATELETS BLD QL SMEAR: ABNORMAL
SODIUM SERPL-SCNC: 137 MMOL/L (ref 136–145)
T&S EXPIRATION DATE: NORMAL
UFH PPP CHRO-ACNC: <0.1 IU/ML (ref 0.3–0.7)
VARIANT LYMPHS NFR BLD MANUAL: 1 % (ref 0–5)
VARIANT LYMPHS NFR BLD MANUAL: 13.5 % (ref 19.6–45.3)
WBC MORPH BLD: NORMAL
WBC NRBC COR # BLD AUTO: 3.82 10*3/MM3 (ref 3.4–10.8)

## 2025-05-21 PROCEDURE — 94640 AIRWAY INHALATION TREATMENT: CPT

## 2025-05-21 PROCEDURE — 86901 BLOOD TYPING SEROLOGIC RH(D): CPT | Performed by: INTERNAL MEDICINE

## 2025-05-21 PROCEDURE — 85730 THROMBOPLASTIN TIME PARTIAL: CPT | Performed by: NURSE PRACTITIONER

## 2025-05-21 PROCEDURE — 86850 RBC ANTIBODY SCREEN: CPT | Performed by: INTERNAL MEDICINE

## 2025-05-21 PROCEDURE — 93971 EXTREMITY STUDY: CPT | Performed by: STUDENT IN AN ORGANIZED HEALTH CARE EDUCATION/TRAINING PROGRAM

## 2025-05-21 PROCEDURE — 85610 PROTHROMBIN TIME: CPT | Performed by: NURSE PRACTITIONER

## 2025-05-21 PROCEDURE — 80053 COMPREHEN METABOLIC PANEL: CPT | Performed by: NURSE PRACTITIONER

## 2025-05-21 PROCEDURE — 93926 LOWER EXTREMITY STUDY: CPT | Performed by: STUDENT IN AN ORGANIZED HEALTH CARE EDUCATION/TRAINING PROGRAM

## 2025-05-21 PROCEDURE — 25010000002 HEPARIN (PORCINE) 25000-0.45 UT/250ML-% SOLUTION: Performed by: INTERNAL MEDICINE

## 2025-05-21 PROCEDURE — 36415 COLL VENOUS BLD VENIPUNCTURE: CPT | Performed by: INTERNAL MEDICINE

## 2025-05-21 PROCEDURE — 94799 UNLISTED PULMONARY SVC/PX: CPT

## 2025-05-21 PROCEDURE — 85520 HEPARIN ASSAY: CPT | Performed by: NURSE PRACTITIONER

## 2025-05-21 PROCEDURE — 85007 BL SMEAR W/DIFF WBC COUNT: CPT | Performed by: NURSE PRACTITIONER

## 2025-05-21 PROCEDURE — 25010000002 MORPHINE PER 10 MG: Performed by: INTERNAL MEDICINE

## 2025-05-21 PROCEDURE — 93926 LOWER EXTREMITY STUDY: CPT

## 2025-05-21 PROCEDURE — 93971 EXTREMITY STUDY: CPT

## 2025-05-21 PROCEDURE — 85730 THROMBOPLASTIN TIME PARTIAL: CPT | Performed by: INTERNAL MEDICINE

## 2025-05-21 PROCEDURE — 99285 EMERGENCY DEPT VISIT HI MDM: CPT

## 2025-05-21 PROCEDURE — 85025 COMPLETE CBC W/AUTO DIFF WBC: CPT | Performed by: NURSE PRACTITIONER

## 2025-05-21 PROCEDURE — 99221 1ST HOSP IP/OBS SF/LOW 40: CPT | Performed by: NURSE PRACTITIONER

## 2025-05-21 PROCEDURE — 86900 BLOOD TYPING SEROLOGIC ABO: CPT | Performed by: INTERNAL MEDICINE

## 2025-05-21 RX ORDER — SPIRONOLACTONE 25 MG/1
25 TABLET ORAL DAILY
Status: DISCONTINUED | OUTPATIENT
Start: 2025-05-22 | End: 2025-06-05 | Stop reason: HOSPADM

## 2025-05-21 RX ORDER — SODIUM CHLORIDE 9 MG/ML
40 INJECTION, SOLUTION INTRAVENOUS AS NEEDED
Status: DISCONTINUED | OUTPATIENT
Start: 2025-05-21 | End: 2025-05-30 | Stop reason: SDUPTHER

## 2025-05-21 RX ORDER — PANTOPRAZOLE SODIUM 40 MG/1
40 TABLET, DELAYED RELEASE ORAL
Status: DISCONTINUED | OUTPATIENT
Start: 2025-05-22 | End: 2025-06-05 | Stop reason: HOSPADM

## 2025-05-21 RX ORDER — POLYETHYLENE GLYCOL 3350 17 G/17G
17 POWDER, FOR SOLUTION ORAL DAILY PRN
Status: DISCONTINUED | OUTPATIENT
Start: 2025-05-21 | End: 2025-06-05 | Stop reason: HOSPADM

## 2025-05-21 RX ORDER — SODIUM CHLORIDE 0.9 % (FLUSH) 0.9 %
10 SYRINGE (ML) INJECTION AS NEEDED
Status: DISCONTINUED | OUTPATIENT
Start: 2025-05-21 | End: 2025-05-30 | Stop reason: SDUPTHER

## 2025-05-21 RX ORDER — NIFEDIPINE 60 MG/1
60 TABLET, EXTENDED RELEASE ORAL DAILY
Status: DISCONTINUED | OUTPATIENT
Start: 2025-05-22 | End: 2025-05-22

## 2025-05-21 RX ORDER — MORPHINE SULFATE 2 MG/ML
1 INJECTION, SOLUTION INTRAMUSCULAR; INTRAVENOUS EVERY 4 HOURS PRN
Status: DISPENSED | OUTPATIENT
Start: 2025-05-21 | End: 2025-05-30

## 2025-05-21 RX ORDER — HYDRALAZINE HYDROCHLORIDE 50 MG/1
100 TABLET, FILM COATED ORAL 3 TIMES DAILY
Status: DISCONTINUED | OUTPATIENT
Start: 2025-05-22 | End: 2025-05-22

## 2025-05-21 RX ORDER — LEVOTHYROXINE SODIUM 100 UG/1
100 TABLET ORAL
Status: DISCONTINUED | OUTPATIENT
Start: 2025-05-22 | End: 2025-06-05 | Stop reason: HOSPADM

## 2025-05-21 RX ORDER — BISACODYL 10 MG
10 SUPPOSITORY, RECTAL RECTAL DAILY PRN
Status: DISCONTINUED | OUTPATIENT
Start: 2025-05-21 | End: 2025-06-05 | Stop reason: HOSPADM

## 2025-05-21 RX ORDER — SODIUM CHLORIDE 0.9 % (FLUSH) 0.9 %
10 SYRINGE (ML) INJECTION AS NEEDED
Status: DISCONTINUED | OUTPATIENT
Start: 2025-05-21 | End: 2025-06-05 | Stop reason: HOSPADM

## 2025-05-21 RX ORDER — ASPIRIN 81 MG/1
81 TABLET ORAL DAILY
Status: DISCONTINUED | OUTPATIENT
Start: 2025-05-22 | End: 2025-06-05 | Stop reason: HOSPADM

## 2025-05-21 RX ORDER — BISACODYL 5 MG/1
5 TABLET, DELAYED RELEASE ORAL DAILY PRN
Status: DISCONTINUED | OUTPATIENT
Start: 2025-05-21 | End: 2025-06-05 | Stop reason: HOSPADM

## 2025-05-21 RX ORDER — TAMSULOSIN HYDROCHLORIDE 0.4 MG/1
0.4 CAPSULE ORAL NIGHTLY
Status: DISCONTINUED | OUTPATIENT
Start: 2025-05-21 | End: 2025-06-05 | Stop reason: HOSPADM

## 2025-05-21 RX ORDER — VALSARTAN 80 MG/1
320 TABLET ORAL DAILY
Status: DISCONTINUED | OUTPATIENT
Start: 2025-05-22 | End: 2025-05-22

## 2025-05-21 RX ORDER — CLOPIDOGREL BISULFATE 75 MG/1
75 TABLET ORAL DAILY
Status: DISCONTINUED | OUTPATIENT
Start: 2025-05-22 | End: 2025-06-05 | Stop reason: HOSPADM

## 2025-05-21 RX ORDER — HEPARIN SODIUM 10000 [USP'U]/100ML
18 INJECTION, SOLUTION INTRAVENOUS
Status: DISCONTINUED | OUTPATIENT
Start: 2025-05-21 | End: 2025-05-22

## 2025-05-21 RX ORDER — CALCITRIOL 0.25 UG/1
0.5 CAPSULE, LIQUID FILLED ORAL DAILY
Status: DISCONTINUED | OUTPATIENT
Start: 2025-05-22 | End: 2025-06-05 | Stop reason: HOSPADM

## 2025-05-21 RX ORDER — ATORVASTATIN CALCIUM 10 MG/1
10 TABLET, FILM COATED ORAL DAILY
Status: DISCONTINUED | OUTPATIENT
Start: 2025-05-22 | End: 2025-06-05 | Stop reason: HOSPADM

## 2025-05-21 RX ORDER — CARVEDILOL 25 MG/1
25 TABLET ORAL 2 TIMES DAILY WITH MEALS
Status: DISCONTINUED | OUTPATIENT
Start: 2025-05-21 | End: 2025-05-22

## 2025-05-21 RX ORDER — IPRATROPIUM BROMIDE AND ALBUTEROL SULFATE 2.5; .5 MG/3ML; MG/3ML
3 SOLUTION RESPIRATORY (INHALATION)
Status: DISCONTINUED | OUTPATIENT
Start: 2025-05-21 | End: 2025-05-28

## 2025-05-21 RX ORDER — SODIUM CHLORIDE 0.9 % (FLUSH) 0.9 %
10 SYRINGE (ML) INJECTION EVERY 12 HOURS SCHEDULED
Status: DISCONTINUED | OUTPATIENT
Start: 2025-05-21 | End: 2025-05-30 | Stop reason: SDUPTHER

## 2025-05-21 RX ORDER — ALPRAZOLAM 0.25 MG
0.25 TABLET ORAL NIGHTLY PRN
Status: DISCONTINUED | OUTPATIENT
Start: 2025-05-21 | End: 2025-06-05 | Stop reason: HOSPADM

## 2025-05-21 RX ORDER — CLONIDINE HYDROCHLORIDE 0.1 MG/1
0.3 TABLET ORAL 3 TIMES DAILY
Status: DISCONTINUED | OUTPATIENT
Start: 2025-05-21 | End: 2025-06-04

## 2025-05-21 RX ORDER — AMOXICILLIN 250 MG
2 CAPSULE ORAL 2 TIMES DAILY PRN
Status: DISCONTINUED | OUTPATIENT
Start: 2025-05-21 | End: 2025-06-05 | Stop reason: HOSPADM

## 2025-05-21 RX ORDER — MONTELUKAST SODIUM 10 MG/1
10 TABLET ORAL NIGHTLY
Status: DISCONTINUED | OUTPATIENT
Start: 2025-05-21 | End: 2025-06-05 | Stop reason: HOSPADM

## 2025-05-21 RX ORDER — ISOSORBIDE DINITRATE 10 MG/1
10 TABLET ORAL
Status: DISCONTINUED | OUTPATIENT
Start: 2025-05-22 | End: 2025-06-05 | Stop reason: HOSPADM

## 2025-05-21 RX ADMIN — HEPARIN SODIUM 18 UNITS/KG/HR: 10000 INJECTION, SOLUTION INTRAVENOUS at 20:03

## 2025-05-21 RX ADMIN — MORPHINE SULFATE 1 MG: 2 INJECTION, SOLUTION INTRAMUSCULAR; INTRAVENOUS at 20:20

## 2025-05-21 RX ADMIN — CARVEDILOL 25 MG: 25 TABLET, FILM COATED ORAL at 20:20

## 2025-05-21 RX ADMIN — TAMSULOSIN HYDROCHLORIDE 0.4 MG: 0.4 CAPSULE ORAL at 20:20

## 2025-05-21 RX ADMIN — MORPHINE SULFATE 1 MG: 2 INJECTION, SOLUTION INTRAMUSCULAR; INTRAVENOUS at 23:57

## 2025-05-21 RX ADMIN — Medication 10 ML: at 21:10

## 2025-05-21 RX ADMIN — CLONIDINE HYDROCHLORIDE 0.3 MG: 0.1 TABLET ORAL at 20:20

## 2025-05-21 RX ADMIN — MONTELUKAST SODIUM 10 MG: 10 TABLET, COATED ORAL at 20:20

## 2025-05-21 RX ADMIN — IPRATROPIUM BROMIDE AND ALBUTEROL SULFATE 3 ML: .5; 3 SOLUTION RESPIRATORY (INHALATION) at 21:08

## 2025-05-21 NOTE — CONSULTS
Ricardo Hugo  [id number]  [id number]  [birth year]  Dinesh Camarena MD  5/21/2025    Chief Complaint   Patient presents with    Joint Swelling       HPI: Ricardo Hugo is a 75 y.o. male who is known to our practice for lower extremity PAD as well as recent PermCath placement.  He did recently undergo right lower extremity angiogram and pain has completely resolved to his right leg per his report.  He does state for the past week he has had significant pain to his left lower extremity as well as coolness.  He is a new Monday Wednesday Friday dialysis patient which just began about a month ago.  Past medical history significant for coronary artery disease, anemia, anxiety, arthritis, hearing loss, PAD, avascular necrosis of femoral head, carotid stenosis, coronary artery disease with bypass, COPD hypertension, pernicious anemia, CLL.  He is maintained on aspirin and Plavix.    Past Medical History:   Diagnosis Date    3-vessel CAD 08/11/2020    Allergic rhinitis     Anemia     Anxiety disorder 04/27/2020    Arthritis     Asymmetrical sensorineural hearing loss 06/28/2017    Atherosclerosis of native artery of both lower extremities with intermittent claudication 07/18/2019    Avascular necrosis of femoral head, left 07/11/2020    right hip after surgery    Carotid stenosis     Chronic mucoid otitis media     Chronic rhinitis     COPD (chronic obstructive pulmonary disease)     Coronary artery disease     HEART BYPASS 2004    Crohn's disease of large intestine with other complication 07/30/2020    Chronic diarrhea Colonoscopy July 2020 revealed mild patchy scattered hemosiderin staining with inflammation more so in rectosigmoid area.  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    Iron deficiency anemia 07/14/2020    Mixed hearing loss of left ear     PAD (peripheral artery disease) 08/11/2020    Perianal abscess     Pernicious anemia 08/17/2020    took shots but never diagnosed with b12 deficiency    Personal history of alcoholism 08/11/2020    quit drinking in 2013    Prostatic hypertrophy 08/11/2020    Sensorineural hearing loss     Sepsis with acute renal failure 09/15/2020    Shortness of breath 05/27/2021    Tinnitus     Ventricular tachycardia, nonsustained 07/14/2020    Weight loss 07/11/2020       Past Surgical History:   Procedure Laterality Date    AORTOGRAM Right 4/25/2025    Procedure: RIGHT LOWER EXTREMITY ANGIOGRAM, SHOCKWAVE LITHOTRIPSY, BALLOON ANGIOPLASTY, MYNX CLOSURE;  Surgeon: Gil Pineda DO;  Location: [facility] OR;  Service: Vascular;  Laterality: Right;    ARTERY SURGERY  2021    right side on neck    CAROTID ENDARTERECTOMY Right 05/10/2021    Procedure: RIGHT CAROTID ENDARTERECTOMY WITH EEG;  Surgeon: Gil Pineda DO;  Location: [facility] HYBRID OR 12;  Service: Vascular;  Laterality: Right;    COLONOSCOPY N/A 07/02/2020    Procedure: COLONOSCOPY WITH ANESTHESIA;  Surgeon: Adrien Brewster MD;  Location: [facility] ENDOSCOPY;  Service: Gastroenterology;  Laterality: N/A;  pre op: diarrhea  post op: polyps  PCP: Joe Velasco MD    COLONOSCOPY N/A 10/13/2020    Procedure: COLONOSCOPY WITH ANESTHESIA;  Surgeon: Adrien Brewster MD;  Location: [facility] ENDOSCOPY;  Service: Gastroenterology;  Laterality: N/A;  Pre: Chronic Diarrhea, Crohn's  Post: AVM  Dr. Neftali Velasco  CO2 Inflation Used    COLONOSCOPY N/A 12/08/2023    Procedure: COLONOSCOPY WITH ANESTHESIA;  Surgeon: Adrien Brewster MD;  Location: [facility] ENDOSCOPY;  Service: Gastroenterology;  Laterality: N/A;  pre chrone's disease  post sub optimal prep, polyp,  jeffrey Murrell    CORONARY ARTERY BYPASS GRAFT  2003    x3    ENDOSCOPY N/A 2021    Procedure: ESOPHAGOGASTRODUODENOSCOPY WITH ANESTHESIA;  Surgeon: Bridger Bell MD;  Location: [facility] ENDOSCOPY;  Service: Gastroenterology;  Laterality: N/A;  pre anemia;gi bleed  post  gi bleed;schatski ring  Dr. ERIC Velasco    ENDOSCOPY N/A 10/10/2023    Procedure: ESOPHAGOGASTRODUODENOSCOPY WITH ANESTHESIA;  Surgeon: Adrien Brewster MD;  Location: [facility] ENDOSCOPY;  Service: Gastroenterology;  Laterality: N/A;  preop; anemia  postop esophagitis ; R/O barretts   PCP Randall Beata    EYE SURGERY Bilateral     catorac    INCISION AND DRAINAGE PERIRECTAL ABSCESS N/A 2017    Procedure: INCISION AND DRAINAGE OF JEET ANAL ABSCESS;  Surgeon: Lynette Smith MD;  Location: [facility] OR;  Service:     INGUINAL HERNIA REPAIR Bilateral 2023    Procedure: INGUINAL HERNIA BILATERAL REPAIR LAPAROSCOPIC WITH DAVINCI ROBOT WITH MESH;  Surgeon: Tahira Rivera MD;  Location: [facility] OR;  Service: Robotics - DaVinci;  Laterality: Bilateral;    INSERTION HEMODIALYSIS CATHETER N/A 2025    Procedure: HEMODIALYSIS CATHETER PLACEMENT;  Surgeon: Gil Pineda DO;  Location: [facility] HYBRID OR;  Service: Vascular;  Laterality: N/A;    MYRINGOTOMY W/ TUBES Left 2017    06/10/2016    TONSILLECTOMY      TOTAL HIP ARTHROPLASTY Right 2006       Family History   Problem Relation Age of Onset    Breast cancer Mother     Dementia Father     Glaucoma Father     No Known Problems Daughter     Colon polyps Neg Hx     Colon cancer Neg Hx        Social History     Socioeconomic History    Marital status:    Tobacco Use    Smoking status: Former     Current packs/day: 0.00     Average packs/day: 0.5 packs/day for 25.8 years (12.9 ttl pk-yrs)     Types: Cigarettes     Start date:      Quit date: 10/13/2013     Years since quittin.6     Passive exposure: Past    Smokeless tobacco: Never    Tobacco comments:      quit 2013   Vaping Use    Vaping status: Former    Substances: Nicotine    Devices: Pre-filled or refillable cartridge   Substance and Sexual Activity    Alcohol use: Not Currently    Drug use: No    Sexual activity: Not Currently     Partners: Female       Allergies   Allergen Reactions    Ondansetron Anaphylaxis and GI Intolerance    Zofran [Ondansetron Hcl] Anaphylaxis    Lortab [Hydrocodone-Acetaminophen] Other (See Comments) and Hallucinations     CLOSTROPHOBIC    Allopurinol Other (See Comments)     Pain on right side       Clinic-Administered Medications         Dose Frequency Start End    cyanocobalamin injection 1,000 mcg 1,000 mcg Every 28 Days 8/11/2023 --    Route: Intramuscular          Hospital Medications (active)         Dose Frequency Start End    heparin 74082 units/250 mL (100 units/mL) in 0.45 % NaCl infusion 18 Units/kg/hr × 61.7 kg Titrated 5/21/2025 --    Admin Instructions: Weight based heparin nomogram for High Dose Protocol for VTE (DVT/PE)    Max initial infusion rate 1500 units/hr.    aPTT < 66 sec : Increase infusion dose by 4 Units/kg/hr. Next aPTT in 6 hours.  aPTT 66-70 sec : Increase infusion dose by 3 Units/kg/hr. Next aPTT in 6 hours.  aPTT 71-76 sec : Increase infusion dose by 2 Units/kg/hr. Next aPTT in 6 hours.  aPTT 77-99 sec : No change. Next aPTT in 6 hours (after 2 consecutive levels in range check  qAM).  aPTT 100-104 sec : Decrease infusion dose by 1 Units/kg/hr. Next aPTT in 6 hours.  aPTT 105-110 sec : Decrease infusion dose by 2 Units/kg/hr. Next aPTT in 6 hours.  aPTT 111-116 sec : Hold infusion 60 minutes. Decrease infusion dose by 3 Units/kg/hr. Next aPTT in 6 hours.  aPTT > 116 sec : Hold infusion. After aPTT less than 99, Decrease previous rate by 4 Units/kg/hr. Next aPTT Every 2 hours until aPTT less than 99 then when infusion restarts in 6 hours.       Route: Intravenous    sodium chloride 0.9 % flush 10 mL 10 mL As Needed 5/21/2025 --    Route: Intravenous  "   Linked Group 1: Placed in \"And\" Linked Group                Review of Systems   Constitutional: Negative.    HENT: Negative.     Eyes: Negative.    Respiratory: Negative.     Cardiovascular: Negative.         Left leg pain   Gastrointestinal: Negative.    Endocrine: Negative.    Genitourinary: Negative.    Musculoskeletal: Negative.    Skin:  Positive for color change.   Allergic/Immunologic: Negative.    Neurological: Negative.    Hematological: Negative.    Psychiatric/Behavioral: Negative.     All other systems reviewed and are negative.      /51 (BP Location: Left arm, Patient Position: Sitting)   Pulse 58   Temp 98.5 °F (36.9 °C) (Oral)   Resp 18   Ht 182.9 cm (72.01\")   Wt 61.7 kg (136 lb 0.4 oz)   SpO2 96%   BMI 18.44 kg/m²    Physical Exam  Vitals and nursing note reviewed.   Constitutional:       Appearance: Normal appearance. He is well-developed and underweight.   HENT:      Head: Normocephalic and atraumatic.   Eyes:      General: No scleral icterus.     Pupils: Pupils are equal, round, and reactive to light.   Neck:      Thyroid: No thyromegaly.   Cardiovascular:      Rate and Rhythm: Normal rate and regular rhythm.      Heart sounds: Normal heart sounds.      Comments: Left leg cooler, painful, left groin healed from previous intervention  Pulmonary:      Effort: Pulmonary effort is normal.      Breath sounds: Normal breath sounds.   Abdominal:      General: Bowel sounds are normal.      Palpations: Abdomen is soft.   Musculoskeletal:         General: Normal range of motion.      Cervical back: Normal range of motion and neck supple.   Skin:     General: Skin is warm and dry.   Neurological:      Mental Status: He is alert and oriented to person, place, and time.   Psychiatric:         Behavior: Behavior normal.         Thought Content: Thought content normal.         Judgment: Judgment normal.         Laboratory Data:  Results from last 7 days   Lab Units 05/21/25  1632   WBC 10*3/mm3 " 3.82   HEMOGLOBIN g/dL 7.3*   HEMATOCRIT % 22.2*   PLATELETS 10*3/mm3 83*       Results from last 7 days   Lab Units 05/21/25  1632   SODIUM mmol/L 137   POTASSIUM mmol/L 3.1*   CHLORIDE mmol/L 95*   CO2 mmol/L 30.0*   BUN mg/dL 14   CREATININE mg/dL 2.29*   CALCIUM mg/dL 8.5*   BILIRUBIN mg/dL 0.2   ALK PHOS U/L 110   ALT (SGPT) U/L 5   AST (SGOT) U/L 18   GLUCOSE mg/dL 99     Results from last 7 days   Lab Units 05/21/25  1632   PROTIME Seconds 16.4*   INR  1.25*   APTT seconds 32.7         Diagnostic Data:  Imaging Results (Last 24 Hours)       Procedure Component Value Units Date/Time    US Arterial Doppler Lower Extremity Left [[id number]] Collected: 05/21/25 1720     Updated: 05/21/25 1725    Narrative:      History: PAD     Comments: Grayscale imaging as well as color flow duplex were used to  evaluate left lower extremity arterial system.     On the left, the peak systolic velocity in the common femoral artery  measured 149.3 cm/s. In the profunda femoris artery measured 124.6 cm/s.  In the proximal SFA measured 60.8 cm/s. In the mid SFA measured 0 cm/s.  In the distal SFA measured 82.3 cm/s. In the popliteal artery measured  11.9 cm/s. In the posterior tibial artery measured 18.5 cm/s. In the  anterior tibial artery measured 36.5 cm/s.       Impression:      There is an occlusion of the left mid SFA with distal  reconstitution in the distal SFA along with diminished waveforms beyond  the level of occlusion. No flow visualized in the left DP. There is  monophasic waveforms in the left common femoral artery which may  indicate more proximal disease.     This report was signed and finalized on 5/21/2025 5:22 PM by Blayne Zabala.       US Venous Doppler Lower Extremity Left (duplex) [[id number]] Resulted: 05/21/25 1614     Updated: 05/21/25 1618            Impression:    Arterial occlusion  End stage renal disease, dialysis Mon, Wed, Fri    Plan: After thoroughly evaluating Ricardo Hugo, I believe the best  course of action is to proceed with an aortoiliac angiogram with a left lower extremity runoff tomorrow with Dr. Zabala.  Risks of angiogram were discussed.  These include, but are not limited to, bleeding, infection, vessel damage, nerve damage, embolus, and loss of limb.  The patient understands these risks and wishes to proceed with procedure.  I have asked ER to place him on a heparin drip tonight.  N.p.o. after midnight.  Testing does show left SFA occlusion with  reconstitution of the distal SFA but diminished blood flow beyond.  He does have significant iliac disease as well.  Thank you for allowing me to participate in the care of your patient.  Please do not hesitate to call with any questions or concerns.    Elena Jon, APRN   Vascular Surgery  [phone]

## 2025-05-21 NOTE — ED NOTES
MEDICAL SCREENING:    Reason for Visit: complains of bilat leg pain and swelling for 6 weeks. Has just started dialysis. States that left leg is hurting and is more swollen than the right. States that he is out of his tramadol that he was taking for pain. He states that he took excedrin before coming here and pain is tolerable at this time. Is seeing Dr. Pineda for arterial blockages in right leg- pending appointment to see Dr. Pineda about pain in left leg. Extremity is cool to touch and discolored. Have ordered doppler pulses. Have called charge nurse to get pt a bed and have called vascular to do arterial studies     Patient initially seen in triage.  The patient was advised further evaluation and diagnostic testing will be needed, some of the treatment and testing will be initiated in the lobby in order to begin the process.  The patient will be returned to the waiting area for the time being and possibly be re-assessed by a subsequent ED provider.  The patient will be brought back to the treatment area in as timely manner as possible.       Love Prajapati, APRN  05/22/25 0124

## 2025-05-21 NOTE — H&P (VIEW-ONLY)
Ricardo Hugo  [id number]  [id number]  [birth year]  Dinesh Camarena MD  5/21/2025    Chief Complaint   Patient presents with    Joint Swelling       HPI: Ricardo Hugo is a 75 y.o. male who is known to our practice for lower extremity PAD as well as recent PermCath placement.  He did recently undergo right lower extremity angiogram and pain has completely resolved to his right leg per his report.  He does state for the past week he has had significant pain to his left lower extremity as well as coolness.  He is a new Monday Wednesday Friday dialysis patient which just began about a month ago.  Past medical history significant for coronary artery disease, anemia, anxiety, arthritis, hearing loss, PAD, avascular necrosis of femoral head, carotid stenosis, coronary artery disease with bypass, COPD hypertension, pernicious anemia, CLL.  He is maintained on aspirin and Plavix.    Past Medical History:   Diagnosis Date    3-vessel CAD 08/11/2020    Allergic rhinitis     Anemia     Anxiety disorder 04/27/2020    Arthritis     Asymmetrical sensorineural hearing loss 06/28/2017    Atherosclerosis of native artery of both lower extremities with intermittent claudication 07/18/2019    Avascular necrosis of femoral head, left 07/11/2020    right hip after surgery    Carotid stenosis     Chronic mucoid otitis media     Chronic rhinitis     COPD (chronic obstructive pulmonary disease)     Coronary artery disease     HEART BYPASS 2004    Crohn's disease of large intestine with other complication 07/30/2020    Chronic diarrhea Colonoscopy July 2020 revealed mild patchy scattered hemosiderin staining with inflammation more so in rectosigmoid area.  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    Iron deficiency anemia 07/14/2020    Mixed hearing loss of left ear     PAD (peripheral artery disease) 08/11/2020    Perianal abscess     Pernicious anemia 08/17/2020    took shots but never diagnosed with b12 deficiency    Personal history of alcoholism 08/11/2020    quit drinking in 2013    Prostatic hypertrophy 08/11/2020    Sensorineural hearing loss     Sepsis with acute renal failure 09/15/2020    Shortness of breath 05/27/2021    Tinnitus     Ventricular tachycardia, nonsustained 07/14/2020    Weight loss 07/11/2020       Past Surgical History:   Procedure Laterality Date    AORTOGRAM Right 4/25/2025    Procedure: RIGHT LOWER EXTREMITY ANGIOGRAM, SHOCKWAVE LITHOTRIPSY, BALLOON ANGIOPLASTY, MYNX CLOSURE;  Surgeon: Gil Pineda DO;  Location: [facility] OR;  Service: Vascular;  Laterality: Right;    ARTERY SURGERY  2021    right side on neck    CAROTID ENDARTERECTOMY Right 05/10/2021    Procedure: RIGHT CAROTID ENDARTERECTOMY WITH EEG;  Surgeon: Gil Pineda DO;  Location: [facility] HYBRID OR 12;  Service: Vascular;  Laterality: Right;    COLONOSCOPY N/A 07/02/2020    Procedure: COLONOSCOPY WITH ANESTHESIA;  Surgeon: Adrien Brewster MD;  Location: [facility] ENDOSCOPY;  Service: Gastroenterology;  Laterality: N/A;  pre op: diarrhea  post op: polyps  PCP: Joe Velasco MD    COLONOSCOPY N/A 10/13/2020    Procedure: COLONOSCOPY WITH ANESTHESIA;  Surgeon: Adrien Brewster MD;  Location: [facility] ENDOSCOPY;  Service: Gastroenterology;  Laterality: N/A;  Pre: Chronic Diarrhea, Crohn's  Post: AVM  Dr. Neftali Velasco  CO2 Inflation Used    COLONOSCOPY N/A 12/08/2023    Procedure: COLONOSCOPY WITH ANESTHESIA;  Surgeon: Adrien Brewster MD;  Location: [facility] ENDOSCOPY;  Service: Gastroenterology;  Laterality: N/A;  pre chrone's disease  post sub optimal prep, polyp,  jeffrey Murrell    CORONARY ARTERY BYPASS GRAFT  2003    x3    ENDOSCOPY N/A 2021    Procedure: ESOPHAGOGASTRODUODENOSCOPY WITH ANESTHESIA;  Surgeon: Bridger Bell MD;  Location: [facility] ENDOSCOPY;  Service: Gastroenterology;  Laterality: N/A;  pre anemia;gi bleed  post  gi bleed;schatski ring  Dr. ERIC Velasco    ENDOSCOPY N/A 10/10/2023    Procedure: ESOPHAGOGASTRODUODENOSCOPY WITH ANESTHESIA;  Surgeon: Adrien Brewster MD;  Location: [facility] ENDOSCOPY;  Service: Gastroenterology;  Laterality: N/A;  preop; anemia  postop esophagitis ; R/O barretts   PCP Randall Beata    EYE SURGERY Bilateral     catorac    INCISION AND DRAINAGE PERIRECTAL ABSCESS N/A 2017    Procedure: INCISION AND DRAINAGE OF JEET ANAL ABSCESS;  Surgeon: Lynette Smith MD;  Location: [facility] OR;  Service:     INGUINAL HERNIA REPAIR Bilateral 2023    Procedure: INGUINAL HERNIA BILATERAL REPAIR LAPAROSCOPIC WITH DAVINCI ROBOT WITH MESH;  Surgeon: Tahira Rievra MD;  Location: [facility] OR;  Service: Robotics - DaVinci;  Laterality: Bilateral;    INSERTION HEMODIALYSIS CATHETER N/A 2025    Procedure: HEMODIALYSIS CATHETER PLACEMENT;  Surgeon: Gil Pineda DO;  Location: [facility] HYBRID OR;  Service: Vascular;  Laterality: N/A;    MYRINGOTOMY W/ TUBES Left 2017    06/10/2016    TONSILLECTOMY      TOTAL HIP ARTHROPLASTY Right 2006       Family History   Problem Relation Age of Onset    Breast cancer Mother     Dementia Father     Glaucoma Father     No Known Problems Daughter     Colon polyps Neg Hx     Colon cancer Neg Hx        Social History     Socioeconomic History    Marital status:    Tobacco Use    Smoking status: Former     Current packs/day: 0.00     Average packs/day: 0.5 packs/day for 25.8 years (12.9 ttl pk-yrs)     Types: Cigarettes     Start date:      Quit date: 10/13/2013     Years since quittin.6     Passive exposure: Past    Smokeless tobacco: Never    Tobacco comments:      quit 2013   Vaping Use    Vaping status: Former    Substances: Nicotine    Devices: Pre-filled or refillable cartridge   Substance and Sexual Activity    Alcohol use: Not Currently    Drug use: No    Sexual activity: Not Currently     Partners: Female       Allergies   Allergen Reactions    Ondansetron Anaphylaxis and GI Intolerance    Zofran [Ondansetron Hcl] Anaphylaxis    Lortab [Hydrocodone-Acetaminophen] Other (See Comments) and Hallucinations     CLOSTROPHOBIC    Allopurinol Other (See Comments)     Pain on right side       Clinic-Administered Medications         Dose Frequency Start End    cyanocobalamin injection 1,000 mcg 1,000 mcg Every 28 Days 8/11/2023 --    Route: Intramuscular          Hospital Medications (active)         Dose Frequency Start End    heparin 52304 units/250 mL (100 units/mL) in 0.45 % NaCl infusion 18 Units/kg/hr × 61.7 kg Titrated 5/21/2025 --    Admin Instructions: Weight based heparin nomogram for High Dose Protocol for VTE (DVT/PE)    Max initial infusion rate 1500 units/hr.    aPTT < 66 sec : Increase infusion dose by 4 Units/kg/hr. Next aPTT in 6 hours.  aPTT 66-70 sec : Increase infusion dose by 3 Units/kg/hr. Next aPTT in 6 hours.  aPTT 71-76 sec : Increase infusion dose by 2 Units/kg/hr. Next aPTT in 6 hours.  aPTT 77-99 sec : No change. Next aPTT in 6 hours (after 2 consecutive levels in range check  qAM).  aPTT 100-104 sec : Decrease infusion dose by 1 Units/kg/hr. Next aPTT in 6 hours.  aPTT 105-110 sec : Decrease infusion dose by 2 Units/kg/hr. Next aPTT in 6 hours.  aPTT 111-116 sec : Hold infusion 60 minutes. Decrease infusion dose by 3 Units/kg/hr. Next aPTT in 6 hours.  aPTT > 116 sec : Hold infusion. After aPTT less than 99, Decrease previous rate by 4 Units/kg/hr. Next aPTT Every 2 hours until aPTT less than 99 then when infusion restarts in 6 hours.       Route: Intravenous    sodium chloride 0.9 % flush 10 mL 10 mL As Needed 5/21/2025 --    Route: Intravenous  "   Linked Group 1: Placed in \"And\" Linked Group                Review of Systems   Constitutional: Negative.    HENT: Negative.     Eyes: Negative.    Respiratory: Negative.     Cardiovascular: Negative.         Left leg pain   Gastrointestinal: Negative.    Endocrine: Negative.    Genitourinary: Negative.    Musculoskeletal: Negative.    Skin:  Positive for color change.   Allergic/Immunologic: Negative.    Neurological: Negative.    Hematological: Negative.    Psychiatric/Behavioral: Negative.     All other systems reviewed and are negative.      /51 (BP Location: Left arm, Patient Position: Sitting)   Pulse 58   Temp 98.5 °F (36.9 °C) (Oral)   Resp 18   Ht 182.9 cm (72.01\")   Wt 61.7 kg (136 lb 0.4 oz)   SpO2 96%   BMI 18.44 kg/m²    Physical Exam  Vitals and nursing note reviewed.   Constitutional:       Appearance: Normal appearance. He is well-developed and underweight.   HENT:      Head: Normocephalic and atraumatic.   Eyes:      General: No scleral icterus.     Pupils: Pupils are equal, round, and reactive to light.   Neck:      Thyroid: No thyromegaly.   Cardiovascular:      Rate and Rhythm: Normal rate and regular rhythm.      Heart sounds: Normal heart sounds.      Comments: Left leg cooler, painful, left groin healed from previous intervention  Pulmonary:      Effort: Pulmonary effort is normal.      Breath sounds: Normal breath sounds.   Abdominal:      General: Bowel sounds are normal.      Palpations: Abdomen is soft.   Musculoskeletal:         General: Normal range of motion.      Cervical back: Normal range of motion and neck supple.   Skin:     General: Skin is warm and dry.   Neurological:      Mental Status: He is alert and oriented to person, place, and time.   Psychiatric:         Behavior: Behavior normal.         Thought Content: Thought content normal.         Judgment: Judgment normal.         Laboratory Data:  Results from last 7 days   Lab Units 05/21/25  1632   WBC 10*3/mm3 " 3.82   HEMOGLOBIN g/dL 7.3*   HEMATOCRIT % 22.2*   PLATELETS 10*3/mm3 83*       Results from last 7 days   Lab Units 05/21/25  1632   SODIUM mmol/L 137   POTASSIUM mmol/L 3.1*   CHLORIDE mmol/L 95*   CO2 mmol/L 30.0*   BUN mg/dL 14   CREATININE mg/dL 2.29*   CALCIUM mg/dL 8.5*   BILIRUBIN mg/dL 0.2   ALK PHOS U/L 110   ALT (SGPT) U/L 5   AST (SGOT) U/L 18   GLUCOSE mg/dL 99     Results from last 7 days   Lab Units 05/21/25  1632   PROTIME Seconds 16.4*   INR  1.25*   APTT seconds 32.7         Diagnostic Data:  Imaging Results (Last 24 Hours)       Procedure Component Value Units Date/Time    US Arterial Doppler Lower Extremity Left [[id number]] Collected: 05/21/25 1720     Updated: 05/21/25 1725    Narrative:      History: PAD     Comments: Grayscale imaging as well as color flow duplex were used to  evaluate left lower extremity arterial system.     On the left, the peak systolic velocity in the common femoral artery  measured 149.3 cm/s. In the profunda femoris artery measured 124.6 cm/s.  In the proximal SFA measured 60.8 cm/s. In the mid SFA measured 0 cm/s.  In the distal SFA measured 82.3 cm/s. In the popliteal artery measured  11.9 cm/s. In the posterior tibial artery measured 18.5 cm/s. In the  anterior tibial artery measured 36.5 cm/s.       Impression:      There is an occlusion of the left mid SFA with distal  reconstitution in the distal SFA along with diminished waveforms beyond  the level of occlusion. No flow visualized in the left DP. There is  monophasic waveforms in the left common femoral artery which may  indicate more proximal disease.     This report was signed and finalized on 5/21/2025 5:22 PM by Blayne Zabala.       US Venous Doppler Lower Extremity Left (duplex) [[id number]] Resulted: 05/21/25 1614     Updated: 05/21/25 1618            Impression:    Arterial occlusion  End stage renal disease, dialysis Mon, Wed, Fri    Plan: After thoroughly evaluating Ricardo Hugo, I believe the best  course of action is to proceed with an aortoiliac angiogram with a left lower extremity runoff tomorrow with Dr. Zabala.  Risks of angiogram were discussed.  These include, but are not limited to, bleeding, infection, vessel damage, nerve damage, embolus, and loss of limb.  The patient understands these risks and wishes to proceed with procedure.  I have asked ER to place him on a heparin drip tonight.  N.p.o. after midnight.  Testing does show left SFA occlusion with  reconstitution of the distal SFA but diminished blood flow beyond.  He does have significant iliac disease as well.  Thank you for allowing me to participate in the care of your patient.  Please do not hesitate to call with any questions or concerns.    Elena Jon, APRN   Vascular Surgery  [phone]

## 2025-05-21 NOTE — TELEPHONE ENCOUNTER
Caller: jesi chavez    Relationship: Emergency Contact    Best call back number: [phone]    What is the best time to reach you: ANY    Who are you requesting to speak with (clinical staff, provider,  specific staff member): CLINICAL    Do you know the name of the person who called: PTS DAUGHTER    What was the call regarding: PTS DAUGHTER CALLED AND WANTS TO MAKE SURE THAT WE CALL HER REGARDING THE MESSAGE THE PT SENT THIS MORNING ABOUT POSSIBLE SURGERY. SHE DOES ALL HIS SCHEDULING. THANK YOU    Is it okay if the provider responds through [facility]: NO         hide

## 2025-05-21 NOTE — TELEPHONE ENCOUNTER
"Pt called stating he has \"got to get in the hospital\" and \"has to get the swelling out of this leg\" and sounds panicky on the phone.  Explained that we cannot direct admit to the hospital, so if he feels he has an urgent problem, he will need to have someone take him to the ER.  He says he will go and has someone to take him.  "

## 2025-05-21 NOTE — H&P
[facility] Medicine Services  HISTORY AND PHYSICAL    Date of Admission: 5/21/2025  Primary Care Physician: Nathan Zimmer MD    Subjective   Primary Historian: Patient    Chief Complaint: Pain left lower extremity    History of Present Illness  Patient is a 75-year-old  male with past medical history significant for end-stage renal disease on hemodialysis every Monday, Wednesday, and Friday, history of known peripheral arterial disease followed by Dr. Pineda of vascular surgery in the outpatient setting, coronary artery disease, hypertension, and chronic lymphocytic leukemia.  Patient recently had intervention completed by Dr. Pineda of vascular surgery on his right lower extremity including balloon angioplasty; this was completed on 4/26/2025.  Patient also had a 1 day hospital stay on 4/28-4/29 for symptomatic anemia in which she was administered 1 unit of packed red blood cells and also given treatment with Retacrit.  Patient presented today to our facility given left lower extremity pain, which per his report has been getting worse for over 1 week now.  He started noticing some discoloration near his toes on the left foot.  Patient reports that he has been taking a lot of Excedrin in the outpatient setting to try to manage the pain symptoms, to no avail.  He does report taking aspirin in addition to Plavix in the outpatient setting, and remaining compliant with these medications.  In the emergency department today workup has been concerning for a arterial occlusion involving the left lower extremity, patient will be admitted to the hospitalist service with consultation by vascular surgery.  Patient does report that he has been compliant with dialysis, in fact he did go to dialysis today.  He denies any chest pain or chest pressure.      Review of Systems   Otherwise complete ROS reviewed and negative except as mentioned in the HPI.    Past Medical History:    Past Medical History:   Diagnosis Date    3-vessel CAD 08/11/2020    Allergic rhinitis     Anemia     Anxiety disorder 04/27/2020    Arthritis     Asymmetrical sensorineural hearing loss 06/28/2017    Atherosclerosis of native artery of both lower extremities with intermittent claudication 07/18/2019    Avascular necrosis of femoral head, left 07/11/2020    right hip after surgery    Carotid stenosis     Chronic mucoid otitis media     Chronic rhinitis     COPD (chronic obstructive pulmonary disease)     Coronary artery disease     HEART BYPASS 2004    Crohn's disease of large intestine with other complication 07/30/2020    Chronic diarrhea Colonoscopy July 2020 revealed mild patchy scattered hemosiderin staining with inflammation more so in rectosigmoid area.  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    Iron deficiency anemia 07/14/2020    Mixed hearing loss of left ear     PAD (peripheral artery disease) 08/11/2020    Perianal abscess     Pernicious anemia 08/17/2020    took shots but never diagnosed with b12 deficiency    Personal history of alcoholism 08/11/2020    quit drinking in 2013    Prostatic hypertrophy 08/11/2020    Sensorineural hearing loss     Sepsis with acute renal failure 09/15/2020    Shortness of breath 05/27/2021    Tinnitus     Ventricular tachycardia, nonsustained 07/14/2020    Weight loss 07/11/2020     Past Surgical History:  Past Surgical History:   Procedure Laterality Date    AORTOGRAM Right 4/25/2025    Procedure: RIGHT LOWER EXTREMITY ANGIOGRAM, SHOCKWAVE LITHOTRIPSY, BALLOON ANGIOPLASTY, MYNX CLOSURE;  Surgeon: Miriam  Gil FUCHS DO;  Location: [facility] HYBRID OR;  Service: Vascular;  Laterality: Right;    ARTERY SURGERY  2021    right side on neck    CAROTID ENDARTERECTOMY Right 05/10/2021    Procedure: RIGHT CAROTID ENDARTERECTOMY WITH EEG;  Surgeon: Gil Pineda DO;  Location: [facility] HYBRID OR 12;  Service: Vascular;  Laterality: Right;    COLONOSCOPY N/A 07/02/2020    Procedure: COLONOSCOPY WITH ANESTHESIA;  Surgeon: Adrien Brewster MD;  Location: [facility] ENDOSCOPY;  Service: Gastroenterology;  Laterality: N/A;  pre op: diarrhea  post op: polyps  PCP: Joe Velasco MD    COLONOSCOPY N/A 10/13/2020    Procedure: COLONOSCOPY WITH ANESTHESIA;  Surgeon: Adrien Brewster MD;  Location: [facility] ENDOSCOPY;  Service: Gastroenterology;  Laterality: N/A;  Pre: Chronic Diarrhea, Crohn's  Post: AVM  Dr. Neftali Velasco  CO2 Inflation Used    COLONOSCOPY N/A 12/08/2023    Procedure: COLONOSCOPY WITH ANESTHESIA;  Surgeon: Adrien Brewster MD;  Location: [facility] ENDOSCOPY;  Service: Gastroenterology;  Laterality: N/A;  pre chrone's disease  post sub optimal prep, polyp, chrone's      CORONARY ARTERY BYPASS GRAFT  2003    x3    ENDOSCOPY N/A 11/02/2021    Procedure: ESOPHAGOGASTRODUODENOSCOPY WITH ANESTHESIA;  Surgeon: Bridger Bell MD;  Location: [facility] ENDOSCOPY;  Service: Gastroenterology;  Laterality: N/A;  pre anemia;gi bleed  post  gi bleed;schatski ring  Dr. ERIC Velasco    ENDOSCOPY N/A 10/10/2023    Procedure: ESOPHAGOGASTRODUODENOSCOPY WITH ANESTHESIA;  Surgeon: Adrien Brewster MD;  Location: [facility] ENDOSCOPY;  Service: Gastroenterology;  Laterality: N/A;  preop; anemia  postop esophagitis ; R/O barretts   PCP Randall Beata    EYE SURGERY Bilateral     catorac    INCISION AND DRAINAGE PERIRECTAL ABSCESS N/A 03/03/2017    Procedure: INCISION AND DRAINAGE OF JEET ANAL ABSCESS;  Surgeon: Lynette Smith MD;  Location: [facility] OR;  Service:     INGUINAL HERNIA REPAIR Bilateral 06/27/2023    Procedure: INGUINAL  HERNIA BILATERAL REPAIR LAPAROSCOPIC WITH DAVINCI ROBOT WITH MESH;  Surgeon: Tahira Rivera MD;  Location:  [facility] OR;  Service: Robotics - DaVinci;  Laterality: Bilateral;    INSERTION HEMODIALYSIS CATHETER N/A 4/16/2025    Procedure: HEMODIALYSIS CATHETER PLACEMENT;  Surgeon: Gil Pineda DO;  Location:  [facility] HYBRID OR;  Service: Vascular;  Laterality: N/A;    MYRINGOTOMY W/ TUBES Left 04/17/2017    06/10/2016    TONSILLECTOMY      TOTAL HIP ARTHROPLASTY Right 2006     Social History:  reports that he quit smoking about 11 years ago. His smoking use included cigarettes. He started smoking about 37 years ago. He has a 12.9 pack-year smoking history. He has been exposed to tobacco smoke. He has never used smokeless tobacco. He reports that he does not currently use alcohol. He reports that he does not use drugs.    Family History: family history includes Breast cancer in his mother; Dementia in his father; Glaucoma in his father; No Known Problems in his daughter.       Allergies:  Allergies   Allergen Reactions    Ondansetron Anaphylaxis and GI Intolerance    Zofran [Ondansetron Hcl] Anaphylaxis    Lortab [Hydrocodone-Acetaminophen] Other (See Comments) and Hallucinations     CLOSTROPHOBIC    Allopurinol Other (See Comments)     Pain on right side       Medications:  Prior to Admission medications    Medication Sig Start Date End Date Taking? Authorizing Provider   traMADol (ULTRAM) 50 MG tablet Take 1 tablet by mouth Every 6 (Six) Hours As Needed for Moderate Pain. 5/6/25  Yes Nathan Zimmer MD   albuterol sulfate  (90 Base) MCG/ACT inhaler Inhale 2 puffs Every 6 (Six) Hours As Needed for Wheezing or Shortness of Air.    ProviderTwyla MD   ALPRAZolam (XANAX) 0.25 MG tablet TAKE 1 TABLET BY MOUTH EVERY NIGHT 5/9/25   Ruthie Parra APRN   aspirin (aspirin) 81 MG EC tablet Take 1 tablet by mouth Daily.    ProviderTwyla MD   atorvastatin (LIPITOR) 10 MG tablet Take 1  tablet by mouth Daily. 9/4/24   Nathan Zimmer MD   Budeson-Glycopyrrol-Formoterol (Breztri Aerosphere) 160-9-4.8 MCG/ACT aerosol inhaler Inhale 2 puffs 2 (Two) Times a Day As Needed.    Twyla Guevara MD   calcitriol (ROCALTROL) 0.5 MCG capsule Take 1 capsule by mouth Daily. 1/28/25   Nathan Zimmer MD   carvedilol (COREG) 25 MG tablet Take 1 tablet by mouth 2 (Two) Times a Day With Meals.    Twyla Guevara MD   cholestyramine light 4 g packet Take 1 packet by mouth Daily. 3/12/25   Adrien Brewster MD   cloNIDine (CATAPRES) 0.3 MG tablet Take 1 tablet by mouth 3 times a day.    Twyla Guevara MD   cloNIDine (CATAPRES-TTS) 0.2 MG/24HR patch Place 1 patch on the skin as directed by provider 1 (One) Time Per Week. THURSDAYS    Twyla Guevara MD   clopidogrel (PLAVIX) 75 MG tablet Take 1 tablet by mouth Daily.    Twyla Guevara MD   Copper Gluconate (Copper Caps) 2 MG capsule Take 2 mg by mouth Daily.    Twyla Guevara MD   desoximetasone (TOPICORT) 0.25 % cream Apply 1 Application topically to the appropriate area as directed 2 (Two) Times a Day As Needed for Irritation. irritation    Twyla Guevara MD   docusate sodium (COLACE) 100 MG capsule Take 1 capsule by mouth 3 (Three) Times a Day As Needed for Constipation.    Twyla Guevara MD   empagliflozin (Jardiance) 10 MG tablet tablet Take 1 tablet by mouth Daily.  Patient not taking: Reported on 5/19/2025    Twyla Guevara MD   Epoetin Anselmo (PROCRIT IJ) Inject 1 dose as directed 1 (One) Time Per Week. Monday  Patient not taking: Reported on 5/19/2025    Twyla Guevara MD   fexofenadine (ALLEGRA) 180 MG tablet Take 1 tablet by mouth Daily.    Twyla Guevara MD   fluticasone (FLONASE) 50 MCG/ACT nasal spray Administer 2 sprays into the nostril(s) as directed by provider Daily.    Twyla Guevara MD   furosemide (LASIX) 40 MG tablet Take 1 tablet by mouth Daily. 1/28/25    Nathan Zimmer MD   hydrALAZINE (APRESOLINE) 100 MG tablet Take 1 tablet by mouth 3 (Three) Times a Day. 100mg daily 8/1/23   Nathan Zimmer MD   isosorbide dinitrate (ISORDIL) 10 MG tablet Take 1 tablet by mouth 3 (Three) Times a Day. 1/28/25   Nathan Zimmer MD   levothyroxine (Synthroid) 100 MCG tablet Take 1 tablet by mouth Every Morning. 3/25/25   Nathan Zimmer MD   magnesium chloride ER 64 MG DR tablet Take 1 tablet by mouth Daily.    Twyla Guevara MD   melatonin 5 MG tablet tablet Take 3 tablets by mouth Every Night.    Twyla Guevara MD   mesalamine (APRISO) 0.375 g 24 hr capsule Take 4 capsules by mouth Daily. 3/12/25   Adrien Brewster MD   montelukast (SINGULAIR) 10 MG tablet Take 1 tablet by mouth Every Night.    Twyla Guevara MD   Multiple Vitamins-Minerals (PRESERVISION/LUTEIN) capsule Take 1 capsule by mouth 2 (two) times a day.    Twyla Guevara MD   NIFEdipine XL (PROCARDIA XL) 60 MG 24 hr tablet Take 1 tablet by mouth Daily.  Patient taking differently: Take 2 tablets by mouth Daily.    Twyla Guevara MD   NON FORMULARY Take 25 mg by mouth At Night As Needed. Zzzquill    Twyla Guevara MD   omeprazole (priLOSEC) 20 MG capsule Take 1 capsule by mouth Daily.    Twyla Guevara MD   sodium bicarbonate 650 MG tablet Take 1 tablet by mouth 5 (Five) Times a Day.    Twyla Guevara MD   spironolactone (ALDACTONE) 25 MG tablet Take 1 tablet by mouth Daily.    Twyla Guevara MD   tamsulosin (FLOMAX) 0.4 MG capsule 24 hr capsule Take 1 capsule by mouth Every Night.    Twyla Guevaar MD   valsartan (DIOVAN) 320 MG tablet Take 1 tablet by mouth Daily. 5/17/23   Nathan Zimmer MD   vitamin D (ERGOCALCIFEROL) 1.25 MG (79200 UT) capsule capsule Take 1 capsule by mouth 1 (One) Time Per Week.    Twyla Guevara MD     I have utilized all available immediate resources to obtain, update, or review the patient's  "current medications (including all prescriptions, over-the-counter products, herbals, cannabis/cannabidiol products, and vitamin/mineral/dietary (nutritional) supplements).    Objective     Vital Signs: /51 (BP Location: Left arm, Patient Position: Sitting)   Pulse 58   Temp 98.5 °F (36.9 °C) (Oral)   Resp 18   Ht 182.9 cm (72.01\")   Wt 61.7 kg (136 lb 0.4 oz)   SpO2 96%   BMI 18.44 kg/m²   Physical Exam  Vitals reviewed.   Constitutional:       General: He is not in acute distress.     Appearance: He is not toxic-appearing.   HENT:      Head: Normocephalic.      Mouth/Throat:      Mouth: Mucous membranes are moist.   Pulmonary:      Effort: Pulmonary effort is normal. No respiratory distress.   Musculoskeletal:         General: Swelling present.      Comments: Permcath right upper chest   Skin:     Findings: Bruising present.      Comments: Purplish discoloration noted over dorsum of left foot distally at level of multiple toes; left lower extremity cool and hypersensitive to touch with difficulty palpating any DP pulse   Neurological:      General: No focal deficit present.      Mental Status: He is alert.   Psychiatric:         Mood and Affect: Mood normal.      Comments: Very pleasant to chat with; seems to be in good spirits          Results Reviewed:  Lab Results (last 24 hours)       Procedure Component Value Units Date/Time    Heparin Anti-Xa [[id number]]  (Abnormal) Collected: 05/21/25 1632    Specimen: Blood from Arm, Left Updated: 05/21/25 1757     Heparin Anti-Xa (UFH) <0.10 IU/ml     aPTT [[id number]]  (Normal) Collected: 05/21/25 1632    Specimen: Blood from Arm, Left Updated: 05/21/25 1757     PTT 32.7 seconds     Narrative:      PTT = The equivalent PTT values for the therapeutic range of heparin levels at 0.3 to 0.7 U/ml are 77 - 99 seconds.    CBC & Differential [[id number]]  (Abnormal) Collected: 05/21/25 1632    Specimen: Blood from Arm, Left Updated: 05/21/25 1727    Narrative:      " The following orders were created for panel order CBC & Differential.  Procedure                               Abnormality         Status                     ---------                               -----------         ------                     CBC Auto Differential[[id number]]        Abnormal            Final result                 Please view results for these tests on the individual orders.    CBC Auto Differential [[id number]]  (Abnormal) Collected: 05/21/25 1632    Specimen: Blood from Arm, Left Updated: 05/21/25 1727     WBC 3.82 10*3/mm3      RBC 2.20 10*6/mm3      Hemoglobin 7.3 g/dL      Hematocrit 22.2 %      .9 fL      MCH 33.2 pg      MCHC 32.9 g/dL      RDW 17.8 %      RDW-SD 66.4 fl      MPV 9.8 fL      Platelets 83 10*3/mm3     Manual Differential [[id number]]  (Abnormal) Collected: 05/21/25 1632    Specimen: Blood from Arm, Left Updated: 05/21/25 1727     Neutrophil % 72.9 %      Lymphocyte % 13.5 %      Monocyte % 6.3 %      Eosinophil % 6.3 %      Basophil % 0.0 %      Atypical Lymphocyte % 1.0 %      Neutrophils Absolute 2.78 10*3/mm3      Lymphocytes Absolute 0.55 10*3/mm3      Monocytes Absolute 0.24 10*3/mm3      Eosinophils Absolute 0.24 10*3/mm3      Basophils Absolute 0.00 10*3/mm3      Anisocytosis Slight/1+     Hypochromia Slight/1+     Macrocytes Slight/1+     WBC Morphology Normal     Platelet Estimate Decreased    Comprehensive Metabolic Panel [[id number]]  (Abnormal) Collected: 05/21/25 1632    Specimen: Blood from Arm, Left Updated: 05/21/25 1701     Glucose 99 mg/dL      BUN 14 mg/dL      Creatinine 2.29 mg/dL      Sodium 137 mmol/L      Potassium 3.1 mmol/L      Chloride 95 mmol/L      CO2 30.0 mmol/L      Calcium 8.5 mg/dL      Total Protein 5.9 g/dL      Albumin 3.6 g/dL      ALT (SGPT) 5 U/L      AST (SGOT) 18 U/L      Alkaline Phosphatase 110 U/L      Total Bilirubin 0.2 mg/dL      Globulin 2.3 gm/dL      A/G Ratio 1.6 g/dL      BUN/Creatinine Ratio 6.1     Anion Gap 12.0  mmol/L      eGFR 28.7 mL/min/1.73     Narrative:      GFR Categories in Chronic Kidney Disease (CKD)              GFR Category          GFR (mL/min/1.73)    Interpretation  G1                    90 or greater        Normal or high (1)  G2                    60-89                Mild decrease (1)  G3a                   45-59                Mild to moderate decrease  G3b                   30-44                Moderate to severe decrease  G4                    15-29                Severe decrease  G5                    14 or less           Kidney failure    (1)In the absence of evidence of kidney disease, neither GFR category G1 or G2 fulfill the criteria for CKD.    eGFR calculation 2021 CKD-EPI creatinine equation, which does not include race as a factor    Protime-INR [[id number]]  (Abnormal) Collected: 05/21/25 1632    Specimen: Blood from Arm, Left Updated: 05/21/25 1650     Protime 16.4 Seconds      INR 1.25          Imaging Results (Last 24 Hours)       Procedure Component Value Units Date/Time    US Venous Doppler Lower Extremity Left (duplex) [[id number]] Collected: 05/21/25 1817     Updated: 05/21/25 1820    Narrative:      History: Swelling       Impression:      Impression: There is no evidence of deep venous thrombosis or  superficial thrombophlebitis of the left lower extremity.     Comments: Left lower extremity venous duplex exam was performed using  color Doppler flow, Doppler wave form analysis, and grayscale imaging,  with and without compression. There is no evidence of deep venous  thrombosis of the common femoral, superficial femoral, popliteal,  posterior tibial, and peroneal veins. There is no thrombus identified in  the saphenofemoral junction or the greater saphenous vein.         This report was signed and finalized on 5/21/2025 6:17 PM by Blayne Zabala.       US Arterial Doppler Lower Extremity Left [[id number]] Collected: 05/21/25 1720     Updated: 05/21/25 1725    Narrative:      History:  [facility]     Comments: Grayscale imaging as well as color flow duplex were used to  evaluate left lower extremity arterial system.     On the left, the peak systolic velocity in the common femoral artery  measured 149.3 cm/s. In the profunda femoris artery measured 124.6 cm/s.  In the proximal SFA measured 60.8 cm/s. In the mid SFA measured 0 cm/s.  In the distal SFA measured 82.3 cm/s. In the popliteal artery measured  11.9 cm/s. In the posterior tibial artery measured 18.5 cm/s. In the  anterior tibial artery measured 36.5 cm/s.       Impression:      There is an occlusion of the left mid SFA with distal  reconstitution in the distal SFA along with diminished waveforms beyond  the level of occlusion. No flow visualized in the left DP. There is  monophasic waveforms in the left common femoral artery which may  indicate more proximal disease.     This report was signed and finalized on 5/21/2025 5:22 PM by Blayne Zabala.             I have personally reviewed and interpreted the radiology studies and ECG obtained at time of admission.     Assessment / Plan   Assessment:   Active Hospital Problems    Diagnosis     **Arterial occlusion     Thrombocytopenia     Acute pain of left lower extremity     ESRD (end stage renal disease) on dialysis     Chronic diastolic (congestive) heart failure     CLL (chronic lymphocytic leukemia)     Anemia due to chronic kidney disease     Peripheral arterial disease     Essential hypertension        Treatment Plan  Vascular surgery has evaluated patient in ED  Heparin IV gtt  NPO at MN  Consult Nephrology - patient on dialysis every M-W-F  IV Morphine for pain  Currently also on ASA/Plavix/statin  Monitor CBC including Hgb and platelets  Recently required transfusion of PRBCs in addition to Retacrit  Resume home meds as appropriate  Workup ongoing    Medical Decision Making  Number and Complexity of problems: 2 acute; high complexity      Conditions and Status        Condition is  worsening.     [facility] Data  External documents reviewed: hospital discharge summary from April 2025 reviewed  Cardiac tracing (EKG, telemetry) interpretation: pending (so far, no new EKGs loaded in the system from the ER)  Radiology interpretation: arterial studies of LLE reviewed  Labs reviewed: as above  Any tests that were considered but not ordered: none     Decision rules/scores evaluated (example [id number], Wells, etc): none     Discussed with: patient     Care Planning  Shared decision making: Discussed with patient with agreement to proceed with treatment plan as outlined  Code status and discussions: Full Code    Disposition  Social Determinants of Health that impact treatment or disposition: none apparent at this time  Estimated length of stay is likely 2 days or greater    I confirmed that the patient's advanced care plan is present, code status is documented, and a surrogate decision maker is listed in the patient's medical record.     The patient's surrogate decision maker is his daughterKathleen      Electronically signed by Dinesh Camarena MD, 05/21/25, 18:32 CDT.

## 2025-05-21 NOTE — ED PROVIDER NOTES
Subjective   History of Present Illness  Patient is a 75-year-old male who presents to the ER with complaints of left lower extremity leg pain.  He states he has had pain in coolness to the extremity x 1 week.  On April 25, 2025 patient had bilateral angioplasty of iliac arteries right femoral artery lithotripsy with balloon angioplasty per Dr. Pineda.  Patient states the plan was to have a procedure to the left leg at some point in the future.  Due to pain and coolness he came to the ER for evaluation and treatment.  Patient is a new Monday, Wednesday, Friday dialysis patient as well-indicates he began dialysis 4 weeks ago.  Past medical history significant for coronary artery disease, anemia, anxiety disorder, arthritis, hearing loss, atherosclerosis with intermittent claudication, avascular necrosis of femoral head, carotid stenosis, COPD, coronary artery disease with bypass, Crohn's disease, erectile dysfunction, hypertension, peripheral artery disease, pernicious anemia, sepsis, tinnitus, CLL, dialysis patient        Review of Systems   Constitutional: Negative.    HENT: Negative.     Eyes: Negative.    Respiratory: Negative.     Cardiovascular: Negative.    Gastrointestinal: Negative.    Endocrine: Negative.    Genitourinary: Negative.    Musculoskeletal: Negative.         Positive for leg pain   Skin:  Positive for color change.   Allergic/Immunologic: Negative.    Neurological: Negative.    Hematological: Negative.    Psychiatric/Behavioral: Negative.     All other systems reviewed and are negative.      Past Medical History:   Diagnosis Date    3-vessel CAD 08/11/2020    Allergic rhinitis     Anemia     Anxiety disorder 04/27/2020    Arthritis     Asymmetrical sensorineural hearing loss 06/28/2017    Atherosclerosis of native artery of both lower extremities with intermittent claudication 07/18/2019    Avascular necrosis of femoral head, left 07/11/2020    right hip after surgery    Carotid stenosis      Chronic mucoid otitis media     Chronic rhinitis     COPD (chronic obstructive pulmonary disease)     Coronary artery disease     HEART BYPASS 2004    Crohn's disease of large intestine with other complication 07/30/2020    Chronic diarrhea Colonoscopy July 2020 revealed mild patchy scattered hemosiderin staining with inflammation more so in rectosigmoid area.  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    Iron deficiency anemia 07/14/2020    Mixed hearing loss of left ear     PAD (peripheral artery disease) 08/11/2020    Perianal abscess     Pernicious anemia 08/17/2020    took shots but never diagnosed with b12 deficiency    Personal history of alcoholism 08/11/2020    quit drinking in 2013    Prostatic hypertrophy 08/11/2020    Sensorineural hearing loss     Sepsis with acute renal failure 09/15/2020    Shortness of breath 05/27/2021    Tinnitus     Ventricular tachycardia, nonsustained 07/14/2020    Weight loss 07/11/2020       Allergies   Allergen Reactions    Ondansetron Anaphylaxis and GI Intolerance    Zofran [Ondansetron Hcl] Anaphylaxis    Lortab [Hydrocodone-Acetaminophen] Other (See Comments) and Hallucinations     CLOSTROPHOBIC    Allopurinol Other (See Comments)     Pain on right side       Past Surgical History:   Procedure Laterality Date    AORTOGRAM Right 4/25/2025    Procedure: RIGHT LOWER EXTREMITY ANGIOGRAM, SHOCKWAVE LITHOTRIPSY, BALLOON ANGIOPLASTY, MYNX CLOSURE;  Surgeon: Gil Pineda DO;  Location: [facility] HYBRID OR;  Service: Vascular;  Laterality: Right;    ARTERY SURGERY  2021    right side on neck    CAROTID ENDARTERECTOMY  Right 05/10/2021    Procedure: RIGHT CAROTID ENDARTERECTOMY WITH EEG;  Surgeon: Gil Pineda DO;  Location: [facility] HYBRID OR 12;  Service: Vascular;  Laterality: Right;    COLONOSCOPY N/A 07/02/2020    Procedure: COLONOSCOPY WITH ANESTHESIA;  Surgeon: Adrien Brewster MD;  Location: [facility] ENDOSCOPY;  Service: Gastroenterology;  Laterality: N/A;  pre op: diarrhea  post op: polyps  PCP: Joe Velasco MD    COLONOSCOPY N/A 10/13/2020    Procedure: COLONOSCOPY WITH ANESTHESIA;  Surgeon: Adrien Brewster MD;  Location: [facility] ENDOSCOPY;  Service: Gastroenterology;  Laterality: N/A;  Pre: Chronic Diarrhea, Crohn's  Post: AVM  Dr. Neftali Velasco  CO2 Inflation Used    COLONOSCOPY N/A 12/08/2023    Procedure: COLONOSCOPY WITH ANESTHESIA;  Surgeon: Adrien Brewster MD;  Location: [facility] ENDOSCOPY;  Service: Gastroenterology;  Laterality: N/A;  pre chrone's disease  post sub optimal prep, polyp, chrone's      CORONARY ARTERY BYPASS GRAFT  2003    x3    ENDOSCOPY N/A 11/02/2021    Procedure: ESOPHAGOGASTRODUODENOSCOPY WITH ANESTHESIA;  Surgeon: Bridger Bell MD;  Location: [facility] ENDOSCOPY;  Service: Gastroenterology;  Laterality: N/A;  pre anemia;gi bleed  post  gi bleed;schatski ring  Dr. ERIC Velasco    ENDOSCOPY N/A 10/10/2023    Procedure: ESOPHAGOGASTRODUODENOSCOPY WITH ANESTHESIA;  Surgeon: Adrien Brewster MD;  Location: [facility] ENDOSCOPY;  Service: Gastroenterology;  Laterality: N/A;  preop; anemia  postop esophagitis ; R/O barretts   PCP Randall Beata    EYE SURGERY Bilateral     catorac    INCISION AND DRAINAGE PERIRECTAL ABSCESS N/A 03/03/2017    Procedure: INCISION AND DRAINAGE OF JEET ANAL ABSCESS;  Surgeon: Lynette Smith MD;  Location: [facility] OR;  Service:     INGUINAL HERNIA REPAIR Bilateral 06/27/2023    Procedure: INGUINAL HERNIA BILATERAL REPAIR LAPAROSCOPIC WITH DAVINCI ROBOT WITH MESH;  Surgeon: Tahira Rivera MD;  Location: [facility] OR;  Service: Robotics - DaVinci;   Laterality: Bilateral;    INSERTION HEMODIALYSIS CATHETER N/A 2025    Procedure: HEMODIALYSIS CATHETER PLACEMENT;  Surgeon: Gil Pineda DO;  Location:  [facility] HYBRID OR;  Service: Vascular;  Laterality: N/A;    MYRINGOTOMY W/ TUBES Left 2017    06/10/2016    TONSILLECTOMY      TOTAL HIP ARTHROPLASTY Right        Family History   Problem Relation Age of Onset    Breast cancer Mother     Dementia Father     Glaucoma Father     No Known Problems Daughter     Colon polyps Neg Hx     Colon cancer Neg Hx        Social History     Socioeconomic History    Marital status:    Tobacco Use    Smoking status: Former     Current packs/day: 0.00     Average packs/day: 0.5 packs/day for 25.8 years (12.9 ttl pk-yrs)     Types: Cigarettes     Start date:      Quit date: 10/13/2013     Years since quittin.6     Passive exposure: Past    Smokeless tobacco: Never    Tobacco comments:     quit    Vaping Use    Vaping status: Former    Substances: Nicotine    Devices: Pre-filled or refillable cartridge   Substance and Sexual Activity    Alcohol use: Not Currently    Drug use: No    Sexual activity: Not Currently     Partners: Female           Objective   Physical Exam  Vitals and nursing note reviewed.   Constitutional:       General: He is not in acute distress.     Appearance: He is well-developed. He is not diaphoretic.   HENT:      Head: Atraumatic.      Nose: Nose normal.   Eyes:      General: No scleral icterus.     Conjunctiva/sclera: Conjunctivae normal.      Pupils: Pupils are equal, round, and reactive to light.   Neck:      Thyroid: No thyromegaly.      Vascular: No JVD.   Cardiovascular:      Rate and Rhythm: Normal rate and regular rhythm.      Heart sounds: Normal heart sounds. No murmur heard.  Pulmonary:      Effort: Pulmonary effort is normal. No respiratory distress.      Breath sounds: Normal breath sounds. No wheezing or rales.   Chest:      Chest wall: No tenderness.    Abdominal:      General: Bowel sounds are normal. There is no distension.      Palpations: Abdomen is soft. There is no mass.      Tenderness: There is no abdominal tenderness. There is no guarding or rebound.   Musculoskeletal:         General: Tenderness present. Normal range of motion.      Cervical back: Normal range of motion and neck supple.      Comments: Patient has a cold left lower extremity with no pulses found and not able to Doppler, slightly purpleish color to the toes   Lymphadenopathy:      Cervical: No cervical adenopathy.   Skin:     General: Skin is warm and dry.      Coloration: Skin is not pale.      Findings: No erythema or rash.   Neurological:      Mental Status: He is alert and oriented to person, place, and time.      Cranial Nerves: No cranial nerve deficit.      Coordination: Coordination normal.      Deep Tendon Reflexes: Reflexes are normal and symmetric.   Psychiatric:         Behavior: Behavior normal.         Thought Content: Thought content normal.         Judgment: Judgment normal.         Procedures           ED Course  ED Course as of 05/21/25 1809   Wed May 21, 2025   1638 Marylou Celestin on 5/21/2025  4:24 PM CDT  Lt GHANSHYAM prelim (completed in addition to Lt LEV ultrasound). Lt SFA appears occluded at mid thigh, with diminished flow reconstituting at very distal thigh. No flow visualized in distal Lt JOHANA. Final report pending.Technical limitations due to heavy plaque burden throughout. Similar findings to arterial ultrasound completed 04/20/2025. Doppler of pulses not completed prior to testing.       [TW]   1638 Marylou Celestin on 5/21/2025  4:15 PM CDT  Lt LEV prelim (completed in addition to Lt GHANSHYAM ultrasound). No evidence of DVT. Final report pending.   [TW]      ED Course User Index  [TW] Anaya Zambrano APRN                                                       Medical Decision Making  Patient is a 75-year-old male who presents to the ER with complaints of left lower  extremity leg pain.  He states he has had pain in coolness to the extremity x 1 week.  On April 25, 2025 patient had bilateral angioplasty of iliac arteries right femoral artery lithotripsy with balloon angioplasty per Dr. Pineda.  Patient states the plan was to have a procedure to the left leg at some point in the future.  Due to pain and coolness he came to the ER for evaluation and treatment.  Patient is a new Monday, Wednesday, Friday dialysis patient as well-indicates he began dialysis 4 weeks ago.  Past medical history significant for coronary artery disease, anemia, anxiety disorder, arthritis, hearing loss, atherosclerosis with intermittent claudication, avascular necrosis of femoral head, carotid stenosis, COPD, coronary artery disease with bypass, Crohn's disease, erectile dysfunction, hypertension, peripheral artery disease, pernicious anemia, sepsis, tinnitus, CLL, dialysis patient    Differential diagnosis includes but not limited to arterial occlusion, arterial insufficiency, DVT, and other etiologies    Amount and/or Complexity of Data Reviewed  Labs: ordered.    Risk  Prescription drug management.      Labs Reviewed   COMPREHENSIVE METABOLIC PANEL - Abnormal; Notable for the following components:       Result Value    Creatinine 2.29 (*)     Potassium 3.1 (*)     Chloride 95 (*)     CO2 30.0 (*)     Calcium 8.5 (*)     Total Protein 5.9 (*)     BUN/Creatinine Ratio 6.1 (*)     eGFR 28.7 (*)     All other components within normal limits    Narrative:     GFR Categories in Chronic Kidney Disease (CKD)              GFR Category          GFR (mL/min/1.73)    Interpretation  G1                    90 or greater        Normal or high (1)  G2                    60-89                Mild decrease (1)  G3a                   45-59                Mild to moderate decrease  G3b                   30-44                Moderate to severe decrease  G4                    15-29                Severe decrease  G5                     14 or less           Kidney failure    (1)In the absence of evidence of kidney disease, neither GFR category G1 or G2 fulfill the criteria for CKD.    eGFR calculation 2021 CKD-EPI creatinine equation, which does not include race as a factor   PROTIME-INR - Abnormal; Notable for the following components:    Protime 16.4 (*)     INR 1.25 (*)     All other components within normal limits   CBC WITH AUTO DIFFERENTIAL - Abnormal; Notable for the following components:    RBC 2.20 (*)     Hemoglobin 7.3 (*)     Hematocrit 22.2 (*)     .9 (*)     MCH 33.2 (*)     RDW 17.8 (*)     RDW-SD 66.4 (*)     Platelets 83 (*)     All other components within normal limits   MANUAL DIFFERENTIAL - Abnormal; Notable for the following components:    Lymphocyte % 13.5 (*)     Eosinophil % 6.3 (*)     Lymphocytes Absolute 0.55 (*)     All other components within normal limits   HEPARIN ANTI XA - Abnormal; Notable for the following components:    Heparin Anti-Xa (UFH) <0.10 (*)     All other components within normal limits   APTT - Normal    Narrative:     PTT = The equivalent PTT values for the therapeutic range of heparin levels at 0.3 to 0.7 U/ml are 77 - 99 seconds.   APTT   APTT   CBC AND DIFFERENTIAL    Narrative:     The following orders were created for panel order CBC & Differential.  Procedure                               Abnormality         Status                     ---------                               -----------         ------                     CBC Auto Differential[[id number]]        Abnormal            Final result                 Please view results for these tests on the individual orders.      US Arterial Doppler Lower Extremity Left   Final Result   There is an occlusion of the left mid SFA with distal   reconstitution in the distal SFA along with diminished waveforms beyond   the level of occlusion. No flow visualized in the left DP. There is   monophasic waveforms in the left common femoral  artery which may   indicate more proximal disease.       This report was signed and finalized on 5/21/2025 5:22 PM by Blayne Zabala.          US Venous Doppler Lower Extremity Left (duplex)    (Results Pending)   Patient has an arterial occlusion described above.  Discussed case with Elena Jon APRN with vascular who request hospitalist to admit.  Wants patient on heparin drip and to be placed n.p.o. after midnight.  Will plan for surgery in the a.m. Dr Zabala and Elena Jon came to the ER to evaluate.    Final diagnoses:   Arterial occlusion   Dialysis patient       ED Disposition  ED Disposition       ED Disposition   Decision to Admit    Condition   --    Comment   Level of Care: Med/Surg [1]   Diagnosis: Arterial occlusion [[id number]]   Admitting Physician: DWAYNE ALEX [[id number]]   Attending Physician: DWAYNE ALEX [[id number]]   Certification: I Certify That Inpatient Hospital Services Are Medically Necessary For Greater Than 2 Midnights                 No follow-up provider specified.       Medication List      No changes were made to your prescriptions during this visit.            Anaya Zambrano, APRN  05/21/25 2403

## 2025-05-22 ENCOUNTER — ANESTHESIA (OUTPATIENT)
Dept: PERIOP | Facility: HOSPITAL | Age: 75
End: 2025-05-22
Payer: MEDICARE

## 2025-05-22 ENCOUNTER — APPOINTMENT (OUTPATIENT)
Dept: INTERVENTIONAL RADIOLOGY/VASCULAR | Facility: HOSPITAL | Age: 75
End: 2025-05-22
Payer: MEDICARE

## 2025-05-22 ENCOUNTER — ANESTHESIA EVENT (OUTPATIENT)
Dept: PERIOP | Facility: HOSPITAL | Age: 75
End: 2025-05-22
Payer: MEDICARE

## 2025-05-22 LAB
ANION GAP SERPL CALCULATED.3IONS-SCNC: 15 MMOL/L (ref 5–15)
APTT PPP: 71 SECONDS (ref 24.5–36)
BUN SERPL-MCNC: 29 MG/DL (ref 8–23)
BUN/CREAT SERPL: 9.2 (ref 7–25)
CALCIUM SPEC-SCNC: 9 MG/DL (ref 8.6–10.5)
CHLORIDE SERPL-SCNC: 100 MMOL/L (ref 98–107)
CO2 SERPL-SCNC: 25 MMOL/L (ref 22–29)
CREAT SERPL-MCNC: 3.16 MG/DL (ref 0.76–1.27)
DEPRECATED RDW RBC AUTO: 69.6 FL (ref 37–54)
EGFRCR SERPLBLD CKD-EPI 2021: 19.5 ML/MIN/1.73
ERYTHROCYTE [DISTWIDTH] IN BLOOD BY AUTOMATED COUNT: 18.4 % (ref 12.3–15.4)
GLUCOSE SERPL-MCNC: 93 MG/DL (ref 65–99)
HCT VFR BLD AUTO: 24.2 % (ref 37.5–51)
HGB BLD-MCNC: 7.5 G/DL (ref 13–17.7)
MCH RBC QN AUTO: 32.2 PG (ref 26.6–33)
MCHC RBC AUTO-ENTMCNC: 31 G/DL (ref 31.5–35.7)
MCV RBC AUTO: 103.9 FL (ref 79–97)
PLATELET # BLD AUTO: 105 10*3/MM3 (ref 140–450)
PMV BLD AUTO: 9.6 FL (ref 6–12)
POTASSIUM SERPL-SCNC: 3.3 MMOL/L (ref 3.5–5.2)
RBC # BLD AUTO: 2.33 10*6/MM3 (ref 4.14–5.8)
SODIUM SERPL-SCNC: 140 MMOL/L (ref 136–145)
WBC NRBC COR # BLD AUTO: 8.83 10*3/MM3 (ref 3.4–10.8)

## 2025-05-22 PROCEDURE — C1725 CATH, TRANSLUMIN NON-LASER: HCPCS | Performed by: STUDENT IN AN ORGANIZED HEALTH CARE EDUCATION/TRAINING PROGRAM

## 2025-05-22 PROCEDURE — 25010000002 LIDOCAINE PF 2% 2 % SOLUTION: Performed by: NURSE ANESTHETIST, CERTIFIED REGISTERED

## 2025-05-22 PROCEDURE — 04FJ3ZZ FRAGMENTATION OF LEFT EXTERNAL ILIAC ARTERY, PERCUTANEOUS APPROACH: ICD-10-PCS | Performed by: STUDENT IN AN ORGANIZED HEALTH CARE EDUCATION/TRAINING PROGRAM

## 2025-05-22 PROCEDURE — 25010000002 HYDROMORPHONE PER 4 MG: Performed by: ANESTHESIOLOGY

## 2025-05-22 PROCEDURE — C1894 INTRO/SHEATH, NON-LASER: HCPCS | Performed by: STUDENT IN AN ORGANIZED HEALTH CARE EDUCATION/TRAINING PROGRAM

## 2025-05-22 PROCEDURE — 25010000002 GLYCOPYRROLATE 0.4 MG/2ML SOLUTION: Performed by: NURSE ANESTHETIST, CERTIFIED REGISTERED

## 2025-05-22 PROCEDURE — 76000 FLUOROSCOPY <1 HR PHYS/QHP: CPT

## 2025-05-22 PROCEDURE — C1769 GUIDE WIRE: HCPCS | Performed by: STUDENT IN AN ORGANIZED HEALTH CARE EDUCATION/TRAINING PROGRAM

## 2025-05-22 PROCEDURE — 25010000002 FENTANYL CITRATE (PF) 50 MCG/ML SOLUTION: Performed by: ANESTHESIOLOGY

## 2025-05-22 PROCEDURE — 25010000002 ALBUMIN HUMAN 5% PER 50 ML: Performed by: NURSE ANESTHETIST, CERTIFIED REGISTERED

## 2025-05-22 PROCEDURE — 25810000003 SODIUM CHLORIDE 0.9 % SOLUTION: Performed by: NURSE ANESTHETIST, CERTIFIED REGISTERED

## 2025-05-22 PROCEDURE — 25010000002 PROTAMINE SULFATE PER 10 MG: Performed by: NURSE ANESTHETIST, CERTIFIED REGISTERED

## 2025-05-22 PROCEDURE — 94799 UNLISTED PULMONARY SVC/PX: CPT

## 2025-05-22 PROCEDURE — 25010000002 FENTANYL CITRATE (PF) 100 MCG/2ML SOLUTION: Performed by: NURSE ANESTHETIST, CERTIFIED REGISTERED

## 2025-05-22 PROCEDURE — 37225 PR REVSC OPN/PRQ FEM/POP W/ATHRC/ANGIOP SM VSL: CPT | Performed by: STUDENT IN AN ORGANIZED HEALTH CARE EDUCATION/TRAINING PROGRAM

## 2025-05-22 PROCEDURE — P9041 ALBUMIN (HUMAN),5%, 50ML: HCPCS | Performed by: NURSE ANESTHETIST, CERTIFIED REGISTERED

## 2025-05-22 PROCEDURE — 25510000001 IOPAMIDOL 61 % SOLUTION: Performed by: STUDENT IN AN ORGANIZED HEALTH CARE EDUCATION/TRAINING PROGRAM

## 2025-05-22 PROCEDURE — 94761 N-INVAS EAR/PLS OXIMETRY MLT: CPT

## 2025-05-22 PROCEDURE — C1887 CATHETER, GUIDING: HCPCS | Performed by: STUDENT IN AN ORGANIZED HEALTH CARE EDUCATION/TRAINING PROGRAM

## 2025-05-22 PROCEDURE — 85027 COMPLETE CBC AUTOMATED: CPT | Performed by: INTERNAL MEDICINE

## 2025-05-22 PROCEDURE — 25010000002 BUPIVACAINE (PF) 0.5 % SOLUTION: Performed by: STUDENT IN AN ORGANIZED HEALTH CARE EDUCATION/TRAINING PROGRAM

## 2025-05-22 PROCEDURE — 94664 DEMO&/EVAL PT USE INHALER: CPT

## 2025-05-22 PROCEDURE — 37221 PR REVSC OPN/PRQ ILIAC ART W/STNT PLMT & ANGIOPLSTY: CPT | Performed by: STUDENT IN AN ORGANIZED HEALTH CARE EDUCATION/TRAINING PROGRAM

## 2025-05-22 PROCEDURE — 25010000002 MORPHINE PER 10 MG: Performed by: INTERNAL MEDICINE

## 2025-05-22 PROCEDURE — 75710 ARTERY X-RAYS ARM/LEG: CPT

## 2025-05-22 PROCEDURE — 25010000002 MORPHINE PER 10 MG: Performed by: STUDENT IN AN ORGANIZED HEALTH CARE EDUCATION/TRAINING PROGRAM

## 2025-05-22 PROCEDURE — 80048 BASIC METABOLIC PNL TOTAL CA: CPT | Performed by: INTERNAL MEDICINE

## 2025-05-22 PROCEDURE — C1760 CLOSURE DEV, VASC: HCPCS | Performed by: STUDENT IN AN ORGANIZED HEALTH CARE EDUCATION/TRAINING PROGRAM

## 2025-05-22 PROCEDURE — 047J3DZ DILATION OF LEFT EXTERNAL ILIAC ARTERY WITH INTRALUMINAL DEVICE, PERCUTANEOUS APPROACH: ICD-10-PCS | Performed by: STUDENT IN AN ORGANIZED HEALTH CARE EDUCATION/TRAINING PROGRAM

## 2025-05-22 PROCEDURE — 25010000002 PROPOFOL 1000 MG/100ML EMULSION: Performed by: NURSE ANESTHETIST, CERTIFIED REGISTERED

## 2025-05-22 PROCEDURE — 75710 ARTERY X-RAYS ARM/LEG: CPT | Performed by: STUDENT IN AN ORGANIZED HEALTH CARE EDUCATION/TRAINING PROGRAM

## 2025-05-22 PROCEDURE — 25010000002 HEPARIN (PORCINE) PER 1000 UNITS: Performed by: STUDENT IN AN ORGANIZED HEALTH CARE EDUCATION/TRAINING PROGRAM

## 2025-05-22 PROCEDURE — 047L3ZZ DILATION OF LEFT FEMORAL ARTERY, PERCUTANEOUS APPROACH: ICD-10-PCS | Performed by: STUDENT IN AN ORGANIZED HEALTH CARE EDUCATION/TRAINING PROGRAM

## 2025-05-22 PROCEDURE — 25010000002 CEFAZOLIN PER 500 MG: Performed by: NURSE PRACTITIONER

## 2025-05-22 PROCEDURE — 25010000002 HYDRALAZINE PER 20 MG: Performed by: HOSPITALIST

## 2025-05-22 PROCEDURE — C1876 STENT, NON-COA/NON-COV W/DEL: HCPCS | Performed by: STUDENT IN AN ORGANIZED HEALTH CARE EDUCATION/TRAINING PROGRAM

## 2025-05-22 PROCEDURE — 76937 US GUIDE VASCULAR ACCESS: CPT | Performed by: STUDENT IN AN ORGANIZED HEALTH CARE EDUCATION/TRAINING PROGRAM

## 2025-05-22 PROCEDURE — 25010000002 HEPARIN (PORCINE) PER 1000 UNITS: Performed by: NURSE ANESTHETIST, CERTIFIED REGISTERED

## 2025-05-22 DEVICE — IMPLANTABLE DEVICE: Type: IMPLANTABLE DEVICE | Site: ARTERY ILIAC | Status: FUNCTIONAL

## 2025-05-22 RX ORDER — FENTANYL CITRATE 50 UG/ML
INJECTION, SOLUTION INTRAMUSCULAR; INTRAVENOUS AS NEEDED
Status: DISCONTINUED | OUTPATIENT
Start: 2025-05-22 | End: 2025-05-22 | Stop reason: SURG

## 2025-05-22 RX ORDER — ALBUMIN HUMAN 50 G/1000ML
SOLUTION INTRAVENOUS CONTINUOUS PRN
Status: DISCONTINUED | OUTPATIENT
Start: 2025-05-22 | End: 2025-05-22 | Stop reason: SURG

## 2025-05-22 RX ORDER — SODIUM CHLORIDE 9 MG/ML
40 INJECTION, SOLUTION INTRAVENOUS AS NEEDED
Status: DISCONTINUED | OUTPATIENT
Start: 2025-05-22 | End: 2025-06-05 | Stop reason: HOSPADM

## 2025-05-22 RX ORDER — PROTAMINE SULFATE 10 MG/ML
INJECTION, SOLUTION INTRAVENOUS AS NEEDED
Status: DISCONTINUED | OUTPATIENT
Start: 2025-05-22 | End: 2025-05-22 | Stop reason: SURG

## 2025-05-22 RX ORDER — CALCIUM CHLORIDE 100 MG/ML
INJECTION INTRAVENOUS; INTRAVENTRICULAR AS NEEDED
Status: DISCONTINUED | OUTPATIENT
Start: 2025-05-22 | End: 2025-05-22 | Stop reason: SURG

## 2025-05-22 RX ORDER — NALOXONE HCL 0.4 MG/ML
0.04 VIAL (ML) INJECTION AS NEEDED
Status: DISCONTINUED | OUTPATIENT
Start: 2025-05-22 | End: 2025-05-22 | Stop reason: HOSPADM

## 2025-05-22 RX ORDER — FENTANYL CITRATE 50 UG/ML
25 INJECTION, SOLUTION INTRAMUSCULAR; INTRAVENOUS
Status: DISCONTINUED | OUTPATIENT
Start: 2025-05-22 | End: 2025-05-22 | Stop reason: HOSPADM

## 2025-05-22 RX ORDER — NITROGLYCERIN 0.4 MG/1
0.4 TABLET SUBLINGUAL
Status: DISCONTINUED | OUTPATIENT
Start: 2025-05-22 | End: 2025-05-22 | Stop reason: SDUPTHER

## 2025-05-22 RX ORDER — PHENOL 1.4 %
1 AEROSOL, SPRAY (ML) MUCOUS MEMBRANE NIGHTLY
COMMUNITY
End: 2025-06-05 | Stop reason: HOSPADM

## 2025-05-22 RX ORDER — SODIUM CHLORIDE 9 MG/ML
500 INJECTION, SOLUTION INTRAVENOUS CONTINUOUS
Status: DISPENSED | OUTPATIENT
Start: 2025-05-22 | End: 2025-05-23

## 2025-05-22 RX ORDER — PROCHLORPERAZINE MALEATE 10 MG
5 TABLET ORAL EVERY 6 HOURS PRN
Status: DISCONTINUED | OUTPATIENT
Start: 2025-05-22 | End: 2025-06-05 | Stop reason: HOSPADM

## 2025-05-22 RX ORDER — LIDOCAINE HYDROCHLORIDE 20 MG/ML
INJECTION, SOLUTION EPIDURAL; INFILTRATION; INTRACAUDAL; PERINEURAL AS NEEDED
Status: DISCONTINUED | OUTPATIENT
Start: 2025-05-22 | End: 2025-05-22 | Stop reason: SURG

## 2025-05-22 RX ORDER — KETAMINE HCL IN NACL, ISO-OSM 100MG/10ML
SYRINGE (ML) INJECTION AS NEEDED
Status: DISCONTINUED | OUTPATIENT
Start: 2025-05-22 | End: 2025-05-22 | Stop reason: SURG

## 2025-05-22 RX ORDER — CARVEDILOL 25 MG/1
25 TABLET ORAL 2 TIMES DAILY WITH MEALS
Status: DISCONTINUED | OUTPATIENT
Start: 2025-05-22 | End: 2025-06-03

## 2025-05-22 RX ORDER — SODIUM CHLORIDE 0.9 % (FLUSH) 0.9 %
10 SYRINGE (ML) INJECTION EVERY 12 HOURS SCHEDULED
Status: DISCONTINUED | OUTPATIENT
Start: 2025-05-22 | End: 2025-06-05 | Stop reason: HOSPADM

## 2025-05-22 RX ORDER — FLUMAZENIL 0.1 MG/ML
0.2 INJECTION INTRAVENOUS AS NEEDED
Status: DISCONTINUED | OUTPATIENT
Start: 2025-05-22 | End: 2025-05-22 | Stop reason: HOSPADM

## 2025-05-22 RX ORDER — PROPOFOL 10 MG/ML
INJECTION, EMULSION INTRAVENOUS AS NEEDED
Status: DISCONTINUED | OUTPATIENT
Start: 2025-05-22 | End: 2025-05-22 | Stop reason: SURG

## 2025-05-22 RX ORDER — GLYCOPYRROLATE 0.2 MG/ML
INJECTION INTRAMUSCULAR; INTRAVENOUS AS NEEDED
Status: DISCONTINUED | OUTPATIENT
Start: 2025-05-22 | End: 2025-05-22 | Stop reason: SURG

## 2025-05-22 RX ORDER — SODIUM CHLORIDE 0.9 % (FLUSH) 0.9 %
3 SYRINGE (ML) INJECTION AS NEEDED
Status: DISCONTINUED | OUTPATIENT
Start: 2025-05-22 | End: 2025-05-22 | Stop reason: HOSPADM

## 2025-05-22 RX ORDER — PROCHLORPERAZINE EDISYLATE 5 MG/ML
5 INJECTION INTRAMUSCULAR; INTRAVENOUS EVERY 6 HOURS PRN
Status: DISCONTINUED | OUTPATIENT
Start: 2025-05-22 | End: 2025-06-05 | Stop reason: HOSPADM

## 2025-05-22 RX ORDER — SODIUM CHLORIDE 0.9 % (FLUSH) 0.9 %
10 SYRINGE (ML) INJECTION AS NEEDED
Status: DISCONTINUED | OUTPATIENT
Start: 2025-05-22 | End: 2025-06-05 | Stop reason: HOSPADM

## 2025-05-22 RX ORDER — HEPARIN SODIUM 1000 [USP'U]/ML
INJECTION, SOLUTION INTRAVENOUS; SUBCUTANEOUS AS NEEDED
Status: DISCONTINUED | OUTPATIENT
Start: 2025-05-22 | End: 2025-05-22 | Stop reason: SURG

## 2025-05-22 RX ORDER — OXYCODONE AND ACETAMINOPHEN 5; 325 MG/1; MG/1
1 TABLET ORAL ONCE AS NEEDED
Status: COMPLETED | OUTPATIENT
Start: 2025-05-22 | End: 2025-05-22

## 2025-05-22 RX ORDER — PROCHLORPERAZINE 25 MG
25 SUPPOSITORY, RECTAL RECTAL EVERY 12 HOURS PRN
Status: DISCONTINUED | OUTPATIENT
Start: 2025-05-22 | End: 2025-06-05 | Stop reason: HOSPADM

## 2025-05-22 RX ORDER — NITROGLYCERIN 0.4 MG/1
0.4 TABLET SUBLINGUAL
Status: DISCONTINUED | OUTPATIENT
Start: 2025-05-22 | End: 2025-06-05 | Stop reason: HOSPADM

## 2025-05-22 RX ORDER — HYDROMORPHONE HYDROCHLORIDE 1 MG/ML
0.25 INJECTION, SOLUTION INTRAMUSCULAR; INTRAVENOUS; SUBCUTANEOUS
Status: COMPLETED | OUTPATIENT
Start: 2025-05-22 | End: 2025-05-22

## 2025-05-22 RX ORDER — LABETALOL HYDROCHLORIDE 5 MG/ML
5 INJECTION, SOLUTION INTRAVENOUS
Status: DISCONTINUED | OUTPATIENT
Start: 2025-05-22 | End: 2025-05-22 | Stop reason: HOSPADM

## 2025-05-22 RX ORDER — BUPIVACAINE HYDROCHLORIDE 5 MG/ML
INJECTION, SOLUTION EPIDURAL; INTRACAUDAL; PERINEURAL AS NEEDED
Status: DISCONTINUED | OUTPATIENT
Start: 2025-05-22 | End: 2025-05-22 | Stop reason: HOSPADM

## 2025-05-22 RX ORDER — VALSARTAN 80 MG/1
320 TABLET ORAL DAILY
Status: DISCONTINUED | OUTPATIENT
Start: 2025-05-22 | End: 2025-06-04

## 2025-05-22 RX ORDER — IOPAMIDOL 612 MG/ML
INJECTION, SOLUTION INTRAVASCULAR AS NEEDED
Status: DISCONTINUED | OUTPATIENT
Start: 2025-05-22 | End: 2025-05-22 | Stop reason: HOSPADM

## 2025-05-22 RX ORDER — HYDRALAZINE HYDROCHLORIDE 50 MG/1
100 TABLET, FILM COATED ORAL 3 TIMES DAILY
Status: DISCONTINUED | OUTPATIENT
Start: 2025-05-22 | End: 2025-05-31

## 2025-05-22 RX ORDER — ALPRAZOLAM 0.25 MG
0.25 TABLET ORAL NIGHTLY
COMMUNITY
End: 2025-06-05 | Stop reason: HOSPADM

## 2025-05-22 RX ORDER — LIDOCAINE HYDROCHLORIDE 10 MG/ML
0.5 INJECTION, SOLUTION EPIDURAL; INFILTRATION; INTRACAUDAL; PERINEURAL ONCE AS NEEDED
Status: DISCONTINUED | OUTPATIENT
Start: 2025-05-22 | End: 2025-05-22 | Stop reason: HOSPADM

## 2025-05-22 RX ORDER — NIFEDIPINE 60 MG/1
60 TABLET, EXTENDED RELEASE ORAL DAILY
Status: DISCONTINUED | OUTPATIENT
Start: 2025-05-22 | End: 2025-05-25

## 2025-05-22 RX ORDER — SODIUM CHLORIDE 9 MG/ML
INJECTION, SOLUTION INTRAVENOUS CONTINUOUS PRN
Status: DISCONTINUED | OUTPATIENT
Start: 2025-05-22 | End: 2025-05-22 | Stop reason: SURG

## 2025-05-22 RX ORDER — HYDRALAZINE HYDROCHLORIDE 20 MG/ML
10 INJECTION INTRAMUSCULAR; INTRAVENOUS EVERY 6 HOURS PRN
Status: DISCONTINUED | OUTPATIENT
Start: 2025-05-22 | End: 2025-06-05 | Stop reason: HOSPADM

## 2025-05-22 RX ADMIN — OXYCODONE HYDROCHLORIDE AND ACETAMINOPHEN 1 TABLET: 5; 325 TABLET ORAL at 13:40

## 2025-05-22 RX ADMIN — CALCITRIOL CAPSULES 0.25 MCG 0.5 MCG: 0.25 CAPSULE ORAL at 15:26

## 2025-05-22 RX ADMIN — Medication 10 ML: at 15:27

## 2025-05-22 RX ADMIN — FENTANYL CITRATE 25 MCG: 50 INJECTION, SOLUTION INTRAMUSCULAR; INTRAVENOUS at 13:14

## 2025-05-22 RX ADMIN — PROPOFOL 50 MG: 10 INJECTION, EMULSION INTRAVENOUS at 08:34

## 2025-05-22 RX ADMIN — PROTAMINE SULFATE 30 MG: 10 INJECTION, SOLUTION INTRAVENOUS at 11:33

## 2025-05-22 RX ADMIN — HEPARIN SODIUM 1000 UNITS: 1000 INJECTION, SOLUTION INTRAVENOUS; SUBCUTANEOUS at 11:05

## 2025-05-22 RX ADMIN — HYDRALAZINE HYDROCHLORIDE 100 MG: 50 TABLET ORAL at 00:48

## 2025-05-22 RX ADMIN — NIFEDIPINE 60 MG: 60 TABLET, FILM COATED, EXTENDED RELEASE ORAL at 05:36

## 2025-05-22 RX ADMIN — HYDROMORPHONE HYDROCHLORIDE 0.25 MG: 1 INJECTION, SOLUTION INTRAMUSCULAR; INTRAVENOUS; SUBCUTANEOUS at 13:15

## 2025-05-22 RX ADMIN — CLOPIDOGREL BISULFATE 75 MG: 75 TABLET, FILM COATED ORAL at 15:26

## 2025-05-22 RX ADMIN — MONTELUKAST SODIUM 10 MG: 10 TABLET, COATED ORAL at 22:23

## 2025-05-22 RX ADMIN — MORPHINE SULFATE 1 MG: 2 INJECTION, SOLUTION INTRAMUSCULAR; INTRAVENOUS at 19:39

## 2025-05-22 RX ADMIN — VALSARTAN 320 MG: 80 TABLET, FILM COATED ORAL at 01:02

## 2025-05-22 RX ADMIN — FENTANYL CITRATE 25 MCG: 50 INJECTION, SOLUTION INTRAMUSCULAR; INTRAVENOUS at 12:56

## 2025-05-22 RX ADMIN — PROTAMINE SULFATE 10 MG: 10 INJECTION, SOLUTION INTRAVENOUS at 12:05

## 2025-05-22 RX ADMIN — TAMSULOSIN HYDROCHLORIDE 0.4 MG: 0.4 CAPSULE ORAL at 22:23

## 2025-05-22 RX ADMIN — HEPARIN SODIUM 6000 UNITS: 1000 INJECTION, SOLUTION INTRAVENOUS; SUBCUTANEOUS at 09:03

## 2025-05-22 RX ADMIN — HYDROMORPHONE HYDROCHLORIDE 0.25 MG: 1 INJECTION, SOLUTION INTRAMUSCULAR; INTRAVENOUS; SUBCUTANEOUS at 12:51

## 2025-05-22 RX ADMIN — ALPRAZOLAM 0.25 MG: 0.25 TABLET ORAL at 00:47

## 2025-05-22 RX ADMIN — IPRATROPIUM BROMIDE AND ALBUTEROL SULFATE 3 ML: .5; 3 SOLUTION RESPIRATORY (INHALATION) at 18:55

## 2025-05-22 RX ADMIN — HYDROMORPHONE HYDROCHLORIDE 0.25 MG: 1 INJECTION, SOLUTION INTRAMUSCULAR; INTRAVENOUS; SUBCUTANEOUS at 13:31

## 2025-05-22 RX ADMIN — LIDOCAINE HYDROCHLORIDE 100 MG: 20 INJECTION, SOLUTION EPIDURAL; INFILTRATION; INTRACAUDAL; PERINEURAL at 08:37

## 2025-05-22 RX ADMIN — CLONIDINE HYDROCHLORIDE 0.3 MG: 0.1 TABLET ORAL at 22:22

## 2025-05-22 RX ADMIN — HYDRALAZINE HYDROCHLORIDE 100 MG: 50 TABLET ORAL at 22:22

## 2025-05-22 RX ADMIN — CARVEDILOL 25 MG: 25 TABLET, FILM COATED ORAL at 18:04

## 2025-05-22 RX ADMIN — LEVOTHYROXINE SODIUM 100 MCG: 0.1 TABLET ORAL at 05:36

## 2025-05-22 RX ADMIN — SODIUM CHLORIDE: 9 INJECTION, SOLUTION INTRAVENOUS at 08:32

## 2025-05-22 RX ADMIN — GLYCOPYRROLATE 0.2 MG: 0.2 INJECTION INTRAMUSCULAR; INTRAVENOUS at 08:56

## 2025-05-22 RX ADMIN — ISOSORBIDE DINITRATE 10 MG: 10 TABLET ORAL at 15:26

## 2025-05-22 RX ADMIN — CALCIUM CHLORIDE 250 MG: 100 INJECTION INTRAVENOUS; INTRAVENTRICULAR at 11:11

## 2025-05-22 RX ADMIN — ASPIRIN 81 MG: 81 TABLET, COATED ORAL at 15:27

## 2025-05-22 RX ADMIN — FENTANYL CITRATE 100 MCG: 50 INJECTION, SOLUTION INTRAMUSCULAR; INTRAVENOUS at 08:34

## 2025-05-22 RX ADMIN — HYDRALAZINE HYDROCHLORIDE 100 MG: 50 TABLET ORAL at 15:26

## 2025-05-22 RX ADMIN — LIDOCAINE HYDROCHLORIDE 100 MG: 20 INJECTION, SOLUTION EPIDURAL; INFILTRATION; INTRACAUDAL; PERINEURAL at 10:34

## 2025-05-22 RX ADMIN — CLONIDINE HYDROCHLORIDE 0.3 MG: 0.1 TABLET ORAL at 15:25

## 2025-05-22 RX ADMIN — PANTOPRAZOLE SODIUM 40 MG: 40 TABLET, DELAYED RELEASE ORAL at 05:36

## 2025-05-22 RX ADMIN — IPRATROPIUM BROMIDE AND ALBUTEROL SULFATE 3 ML: .5; 3 SOLUTION RESPIRATORY (INHALATION) at 06:58

## 2025-05-22 RX ADMIN — HYDROMORPHONE HYDROCHLORIDE 0.25 MG: 1 INJECTION, SOLUTION INTRAMUSCULAR; INTRAVENOUS; SUBCUTANEOUS at 13:02

## 2025-05-22 RX ADMIN — HEPARIN SODIUM 2000 UNITS: 1000 INJECTION, SOLUTION INTRAVENOUS; SUBCUTANEOUS at 10:05

## 2025-05-22 RX ADMIN — CEFAZOLIN 2000 MG: 2 INJECTION, POWDER, FOR SOLUTION INTRAMUSCULAR; INTRAVENOUS at 08:31

## 2025-05-22 RX ADMIN — ISOSORBIDE DINITRATE 10 MG: 10 TABLET ORAL at 18:04

## 2025-05-22 RX ADMIN — Medication 10 ML: at 22:23

## 2025-05-22 RX ADMIN — HYDRALAZINE HYDROCHLORIDE 10 MG: 20 INJECTION INTRAMUSCULAR; INTRAVENOUS at 06:54

## 2025-05-22 RX ADMIN — CARVEDILOL 25 MG: 25 TABLET, FILM COATED ORAL at 05:36

## 2025-05-22 RX ADMIN — MORPHINE SULFATE 1 MG: 2 INJECTION, SOLUTION INTRAMUSCULAR; INTRAVENOUS at 15:24

## 2025-05-22 RX ADMIN — ATORVASTATIN CALCIUM 10 MG: 10 TABLET, FILM COATED ORAL at 15:26

## 2025-05-22 RX ADMIN — PROPOFOL 100 MCG/KG/MIN: 10 INJECTION, EMULSION INTRAVENOUS at 08:36

## 2025-05-22 RX ADMIN — FENTANYL CITRATE 100 MCG: 50 INJECTION, SOLUTION INTRAMUSCULAR; INTRAVENOUS at 10:24

## 2025-05-22 RX ADMIN — MORPHINE SULFATE 1 MG: 2 INJECTION, SOLUTION INTRAMUSCULAR; INTRAVENOUS at 05:37

## 2025-05-22 RX ADMIN — ALBUMIN (HUMAN): 12.5 INJECTION, SOLUTION INTRAVENOUS at 08:41

## 2025-05-22 RX ADMIN — Medication 50 MG: at 08:34

## 2025-05-22 RX ADMIN — SPIRONOLACTONE 25 MG: 25 TABLET ORAL at 15:25

## 2025-05-22 NOTE — OP NOTE
Patient Name: Amy  MRN: [id number]  : 1950    PREOPERATIVE DIAGNOSIS: * No pre-op diagnosis entered *  CL TI with left lower extremity rest pain    POSTOPERATIVE DIAGNOSIS: * No Diagnosis Codes entered *  Same    PROCEDURE PERFORMED:   Right common femoral artery access under ultrasound guidance  Left posterior tibial artery access ultrasound guidance  Left lower extremity arteriogram with third order selection angiographic interpretation  PTA of the left SFA with a 3 x 60 Tammy cross balloon  Shockwave lithotripsy of the left external iliac artery with a 6 x 60 shockwave balloon  Left external iliac artery stenting with a 8 x 60 ever flex stent postdilated with a 7 x 60 EverCross balloon     SURGEON: Denzel Zabala MD     ANESTHESIA: MAC    PREPARATION: Routine.    STAFF: Circulator: Emilia Kline RN  Scrub Person: Ruma Dunbar  Assistant: Milagro Castro  Vascular Radiology Technician: Lela Schulte    Estimated Blood Loss: minimal    SPECIMENS: None    COMPLICATIONS: None    ANGIOGRAPHIC FINDINGS:  There is significant calcific plaque in the bilateral common iliac arteries but these were widely patent without significant stenosis.  There was heavy calcific plaque in the bilateral external iliac arteries.  Left external iliac artery there was 2 areas of high-grade stenosis.  There is one questionable area of short segment high-grade stenosis in the right external iliac artery.  The left common femoral artery was heavily diseased but without significant stenosis.  The left profunda was widely patent without significant stenosis proximally but had significant disease in the very distal branches.  Left SFA was patent proximally with several areas of high-grade stenosis.  There was an occlusion of the distal SFA/P1 segment of the popliteal artery with reconstitution in the P1 segment of the popliteal artery.  There were several areas throughout the P1 segment of the popliteal artery after  reconstitutes segment of questionable high-grade stenosis versus short segment occlusions.  There is heavy calcific plaquing and occlusions at various levels of the peroneal and anterior tibial artery.  There was heavy disease in the posterior tibial artery but this was questionably patent to the foot.    INDICATIONS: Ricardo Hugo is a 75 y.o. male with end-stage renal disease and PAD.  Patient recently underwent right lower extremity arteriogram for rest pain.  The patient states that he has had left lower extremity rest pain for some time but this got worse recently. Risks of angiogram were discussed.  These include, but are not limited to, bleeding, infection, vessel damage, nerve damage, embolus, and loss of limb.  The patient understands these risks and wishes to proceed with procedure. The indications, risks, and possible complications of the procedure were explained to the patient, who voiced understanding and wished to proceed with surgery.     PROCEDURE IN DETAIL: The patient was taken to the operating room and placed on the operating table in a supine position. After MAC anesthesia was obtained, the bilateral groins were prepped and draped in a sterile manner.  A timeout was then conducted for everyone agreed on correct patient procedure and laterality.  The right common femoral artery was then evaluated with ultrasound.  It was noted to have severe disease, and severe calcification.  The right common femoral artery was then accessed under ultrasound guidance.  A picture of this was taken and saved the patient's chart.  A micropuncture wire was then advanced through the needle.  The needle was then exchanged for micropuncture sheath.  Wire was then exchanged for a 035 wire.  The sheath was exchanged for a short 6 [place] sheath.  A pelvic arteriogram was obtained which showed the above findings.  The wire and catheter were then advanced down into the left external iliac artery and a left lower extremity  arteriogram was then obtained.  This showed the above findings and prompted intervention.  The patient was then systemically heparinized and additional boluses were given as needed.  The wire was then readvanced into the catheter.  The catheter and sheath were then exchanged for a 6 x 45 Fr sheath.     Due to the disease in the distal left profunda I attempted to recanalize the SFA and popliteal artery occlusions  A wire was advanced down into the distal SFA all the way to the occluded segment.  I was unable to advanced any overlying catheter over the wire due to the significant proximal disease.  I then angioplastied the proximal SFA with a 3 x 60 Tammy cross.  Through a combination of wires and catheters I was able to traverse through the occluded segment of the SFA and popliteal artery, however I was unable to regain true lumen distally.  The left foot was then prepped and draped.  I then evaluated the left posterior tibial artery which had heavy calcific plaquing.  Skin and soft tissues overlying the left posterior tibial artery with anesthetized local anesthetic.  I then accessed the left posterior tibial artery with a micropuncture needle under ultrasound guidance.  A wire was then advanced through the needle and the needle was exchanged for micropuncture sheath.  Arteriogram was shot which confirmed posterior tibial artery access.  I then attempted to advance multiple wires through the posterior tibial artery but was unable to advance the wire through the posterior tibial artery.  At this point I elected to abandon recanalizing the SFA and converted my attention towards the disease in the left external iliac artery in hopes that this would relieve the patient of his rest pain.  The sheath was then withdrawn into the left common iliac artery.  A 6 x 60 shockwave balloon was then used for shockwave lithotripsy left external iliac artery.  This disease segment was then stented with a 8 x 60 ever flex stent and  postdilated with a 7 x 60 balloon.  Postintervention arteriogram now showed the segment to be widely patent with no significant stenosis.  The sheath was then exchanged for a short 6 [place]ron[place] sheath.  I then attempted to use a Mynx closure device in the right common femoral artery.  During this attempt the balloon got caught up in the right external iliac artery plaque several times.  Due to concern for occluding the vessel with the closure device I then elected to remove all wires and the sheath.     Pressure was held and hemostasis was obtained about the ankle and the common femoral access site.  Sterile dressings were applied. The patient tolerated the procedure well. Sponge and needle counts were correct. The patient was then awoken from anesthesia in the operating room and taken to the recovery room in good condition.    Blayne Zabala MD  Date: 5/22/2025 Time: 17:43 CDT

## 2025-05-22 NOTE — ANESTHESIA PREPROCEDURE EVALUATION
Anesthesia Evaluation     Patient summary reviewed   no history of anesthetic complications:   NPO Solid Status: > 8 hours             Airway   Mallampati: II  Small opening  Dental    (+) edentulous    Pulmonary    (+) a smoker Former, COPD,sleep apnea  (-) asthma  Cardiovascular     (+) hypertension, valvular problems/murmurs murmur and TI, CAD, CABG, CHF , PVD, hyperlipidemia,  carotid artery disease right carotid  (-) past MI, angina    ROS comment: Echo:  ·  Left ventricular systolic function is normal. Left ventricular ejection fraction appears to be 56 - 60%.  ·  Left ventricular wall thickness is consistent with mild septal asymmetric hypertrophy.  ·  Left ventricular diastolic function is consistent with (grade II w/high LAP) pseudonormalization.  ·  Normal right ventricular cavity size and systolic function noted.  ·  The left atrial cavity is mild to moderately dilated.  ·  The right atrial cavity is dilated.  ·  Mild aortic valve stenosis is present.  ·  Mild to moderate mitral valve regurgitation is present.  ·  Moderate to severe tricuspid valve regurgitation is present.  ·  Estimated right ventricular systolic pressure from tricuspid regurgitation is markedly elevated (>55 mmHg).      Neuro/Psych- negative ROS  (-) seizures, TIA, CVA  GI/Hepatic/Renal/Endo    (+) GERD, renal disease- CRI, ESRD and dialysis, thyroid problem hypothyroidism  (-) liver disease, diabetes    Musculoskeletal     Abdominal    Substance History      OB/GYN          Other      history of cancer                  Anesthesia Plan    ASA 4     MAC     (No issues with dialysis yesterday-->ok to proceed     Patient voices that he desires to be a full code.    Approaching a blood transfusion threshold. (Hb 7 yesterday)     Note ondansetron allergy )  intravenous induction     Anesthetic plan, risks, benefits, and alternatives have been provided, discussed and informed consent has been obtained with: patient.    CODE STATUS:    Code  Status (Patient has no pulse and is not breathing): CPR (Attempt to Resuscitate)  Medical Interventions (Patient has pulse or is breathing): Full Support

## 2025-05-22 NOTE — ANESTHESIA POSTPROCEDURE EVALUATION
"Patient: Ricardo Hugo    Procedure Summary       Date: 05/22/25 Room / Location: [facility] OR  / [facility] HYBRID OR    Anesthesia Start: 0831 Anesthesia Stop: 1218    Procedure: LEFT LOWER EXTREMITY ANGIOGRAM, SHOCKWAVE LITHOTRIPSY, BALLOON ANGIOPLASTY, STENT PLACEMENT, MYNX CLOSURE (Left: Groin) Diagnosis:     Surgeons: Blayne Zabala MD Provider: MAHENDRA Charles CRNA    Anesthesia Type: MAC ASA Status: 4            Anesthesia Type: MAC    Vitals  Vitals Value Taken Time   /64 05/22/25 14:21   Temp 99.1 °F (37.3 °C) 05/22/25 14:15   Pulse 71 05/22/25 14:17   Resp 16 05/22/25 14:15   SpO2 98 % 05/22/25 14:17   Vitals shown include unfiled device data.        Post Anesthesia Care and Evaluation    Patient location during evaluation: PACU  Patient participation: complete - patient participated  Level of consciousness: awake and alert  Pain management: adequate    Airway patency: patent  Anesthetic complications: No anesthetic complications    Cardiovascular status: acceptable  Respiratory status: acceptable  Hydration status: acceptable    Comments: Blood pressure 178/64, pulse 77, temperature 98.2 °F (36.8 °C), temperature source Axillary, resp. rate 16, height 182.9 cm (72\"), weight 62 kg (136 lb 11.2 oz), SpO2 99%.    Pt discharged from PACU based on jennifer score >8    "

## 2025-05-22 NOTE — CONSULTS
Nephrology ([facility]) Progress Note      Patient:  Ricardo Hugo  YOB: 1950  Date of Service: 5/22/2025  MRN: [id number]   Acct: [id number]   Primary Care Physician: Nathan Zimmer MD  Advance Directive:   Code Status and Medical Interventions: CPR (Attempt to Resuscitate); Full Support   Ordered at: 05/21/25 1822     Code Status (Patient has no pulse and is not breathing):    CPR (Attempt to Resuscitate)     Medical Interventions (Patient has pulse or is breathing):    Full Support     Admit Date: 5/21/2025       Hospital Day: 1  Referring Provider: No ref. provider found      Patient personally seen and examined.  Complete chart including Consults, Notes, Operative Reports, Labs, Cardiology, and Radiology studies reviewed as able.        Subjective:  Ricardo Hugo is a 75 y.o. male for whom we were consulted for evaluation and treatment of end-stage renal disease.  He is a Monday Wednesday Friday dialysis patient.  He presented for evaluation of left leg pain and was found to have an arterial blockage.  He had undergone an angiogram last month and apparently had a retained balloon by chart review that required a second procedure to retrieve.  He was taken to the OR by Dr. Zabala for intervention.  He was seen immediately after the OR.  He was seen with family.  He is complaining of pain in the operative leg.  Denied other complaint upon questioning.  Denied any issues with dialysis.  Denied current chest pain, shortness of air at rest, nausea or vomiting.    Allergies:  Ondansetron, Zofran [ondansetron hcl], Lortab [hydrocodone-acetaminophen], and Allopurinol    Home Meds:  Facility-Administered Medications Prior to Admission   Medication Dose Route Frequency Provider Last Rate Last Admin    cyanocobalamin injection 1,000 mcg  1,000 mcg Intramuscular Q28 Days Fidel, Meigs C, APRN   1,000 mcg at 08/11/23 1123     Medications Prior to Admission   Medication  Sig Dispense Refill Last Dose/Taking    Melatonin 10 MG tablet Take 1 tablet by mouth Every Night.   Taking    traMADol (ULTRAM) 50 MG tablet Take 1 tablet by mouth Every 6 (Six) Hours As Needed for Moderate Pain. (Patient not taking: Reported on 5/22/2025) 30 tablet 0 Not Taking    albuterol sulfate  (90 Base) MCG/ACT inhaler Inhale 2 puffs Every 6 (Six) Hours As Needed for Wheezing or Shortness of Air.       ALPRAZolam (XANAX) 0.25 MG tablet TAKE 1 TABLET BY MOUTH EVERY NIGHT 90 tablet 1     aspirin (aspirin) 81 MG EC tablet Take 1 tablet by mouth Daily.       aspirin-acetaminophen-caffeine (EXCEDRIN MIGRAINE) 250-250-65 MG per tablet Take 1 tablet by mouth Every 6 (Six) Hours As Needed for Headache.       atorvastatin (LIPITOR) 10 MG tablet Take 1 tablet by mouth Daily. 90 tablet 3     Budeson-Glycopyrrol-Formoterol (Breztri Aerosphere) 160-9-4.8 MCG/ACT aerosol inhaler Inhale 2 puffs 2 (Two) Times a Day As Needed.       calcitriol (ROCALTROL) 0.5 MCG capsule Take 1 capsule by mouth Daily. 90 capsule 2     carvedilol (COREG) 25 MG tablet Take 1 tablet by mouth 2 (Two) Times a Day With Meals.       cholestyramine light 4 g packet Take 1 packet by mouth Daily. 90 packet 1     cloNIDine (CATAPRES) 0.3 MG tablet Take 1 tablet by mouth 3 times a day.       cloNIDine (CATAPRES-TTS) 0.2 MG/24HR patch Place 1 patch on the skin as directed by provider 1 (One) Time Per Week. Saturdays       clopidogrel (PLAVIX) 75 MG tablet Take 1 tablet by mouth Daily.       Copper Gluconate (Copper Caps) 2 MG capsule Take 2 mg by mouth Daily.       desoximetasone (TOPICORT) 0.25 % cream Apply 1 Application topically to the appropriate area as directed 2 (Two) Times a Day As Needed for Irritation. irritation       docusate sodium (COLACE) 100 MG capsule Take 1 capsule by mouth 3 (Three) Times a Day As Needed for Constipation.       empagliflozin (Jardiance) 10 MG tablet tablet Take 1 tablet by mouth Daily. (Patient not taking:  Reported on 5/19/2025)       Epoetin Anselmo (PROCRIT IJ) Inject 1 dose as directed 1 (One) Time Per Week. Monday (Patient not taking: Reported on 5/19/2025)       fexofenadine (ALLEGRA) 180 MG tablet Take 1 tablet by mouth Daily.       fluticasone (FLONASE) 50 MCG/ACT nasal spray Administer 2 sprays into the nostril(s) as directed by provider Daily As Needed for Allergies.       furosemide (LASIX) 40 MG tablet Take 1 tablet by mouth Daily. 90 tablet 2     hydrALAZINE (APRESOLINE) 100 MG tablet Take 1 tablet by mouth 3 (Three) Times a Day. 100mg daily       isosorbide dinitrate (ISORDIL) 10 MG tablet Take 1 tablet by mouth 3 (Three) Times a Day. 270 tablet 2     levothyroxine (Synthroid) 100 MCG tablet Take 1 tablet by mouth Every Morning. 90 tablet 2     magnesium chloride ER 64 MG DR tablet Take 1 tablet by mouth Daily.       mesalamine (APRISO) 0.375 g 24 hr capsule Take 4 capsules by mouth Daily. 360 capsule 1     montelukast (SINGULAIR) 10 MG tablet Take 1 tablet by mouth Every Night.       Multiple Vitamins-Minerals (PRESERVISION/LUTEIN) capsule Take 1 capsule by mouth 2 (two) times a day.       NIFEdipine XL (PROCARDIA XL) 60 MG 24 hr tablet Take 1 tablet by mouth Daily. (Patient taking differently: Take 2 tablets by mouth Daily.)       NON FORMULARY Take 25 mg by mouth At Night As Needed. Zzzquill       omeprazole (priLOSEC) 20 MG capsule Take 1 capsule by mouth Daily.       sodium bicarbonate 650 MG tablet Take 1 tablet by mouth 5 (Five) Times a Day.       spironolactone (ALDACTONE) 25 MG tablet Take 1 tablet by mouth Daily.       tamsulosin (FLOMAX) 0.4 MG capsule 24 hr capsule Take 1 capsule by mouth Every Night.       valsartan (DIOVAN) 320 MG tablet Take 1 tablet by mouth Daily.       vitamin D (ERGOCALCIFEROL) 1.25 MG (13499 UT) capsule capsule Take 1 capsule by mouth 1 (One) Time Per Week.          Medicines:  Current Facility-Administered Medications   Medication Dose Route Frequency Provider Last Rate  Last Admin    ALPRAZolam (XANAX) tablet 0.25 mg  0.25 mg Oral Nightly PRN Blayne Zabala MD   0.25 mg at 05/22/25 0047    aspirin EC tablet 81 mg  81 mg Oral Daily Blayne Zabala MD   81 mg at 05/22/25 1527    atorvastatin (LIPITOR) tablet 10 mg  10 mg Oral Daily Blayne Zabala MD   10 mg at 05/22/25 1526    sennosides-docusate (PERICOLACE) 8.6-50 MG per tablet 2 tablet  2 tablet Oral BID PRN Blayne Zabala MD        And    polyethylene glycol (MIRALAX) packet 17 g  17 g Oral Daily PRN Blayne Zabala MD        And    bisacodyl (DULCOLAX) EC tablet 5 mg  5 mg Oral Daily PRN Blayne Zabala MD        And    bisacodyl (DULCOLAX) suppository 10 mg  10 mg Rectal Daily PRN Blayne Zabala MD        calcitriol (ROCALTROL) capsule 0.5 mcg  0.5 mcg Oral Daily Blayne Zabala MD   0.5 mcg at 05/22/25 1526    carvedilol (COREG) tablet 25 mg  25 mg Oral BID With Meals Blayne Zabala MD   25 mg at 05/22/25 0536    cloNIDine (CATAPRES) tablet 0.3 mg  0.3 mg Oral TID Blayne Zabala MD   0.3 mg at 05/22/25 1525    clopidogrel (PLAVIX) tablet 75 mg  75 mg Oral Daily Blayne Zabala MD   75 mg at 05/22/25 1526    hydrALAZINE (APRESOLINE) injection 10 mg  10 mg Intravenous Q6H PRN Blayne Zabala MD   10 mg at 05/22/25 0654    hydrALAZINE (APRESOLINE) tablet 100 mg  100 mg Oral TID Blayne Zabala MD   100 mg at 05/22/25 1526    ipratropium-albuterol (DUO-NEB) nebulizer solution 3 mL  3 mL Nebulization 4x Daily - RT Blayne Zabala MD   3 mL at 05/22/25 0658    isosorbide dinitrate (ISORDIL) tablet 10 mg  10 mg Oral TID - Nitrates Blayne Zabala MD   10 mg at 05/22/25 1526    levothyroxine (SYNTHROID, LEVOTHROID) tablet 100 mcg  100 mcg Oral Q AM Blayne Zabala MD   100 mcg at 05/22/25 0536    montelukast (SINGULAIR) tablet 10 mg  10 mg Oral Nightly Blayne Zabala MD   10 mg at 05/21/25 2020    Morphine sulfate (PF) injection 1 mg  1 mg Intravenous Q4H PRN Blayne Zabala MD   1 mg at 05/22/25 1524     NIFEdipine XL (PROCARDIA XL) 24 hr tablet 60 mg  60 mg Oral Daily Blayne Zabala MD   60 mg at 05/22/25 0536    nitroglycerin (NITROSTAT) SL tablet 0.4 mg  0.4 mg Sublingual Q5 Min PRN Blayne Zabala MD        pantoprazole (PROTONIX) EC tablet 40 mg  40 mg Oral Q AM Blayne Zabala MD   40 mg at 05/22/25 0536    sodium chloride 0.9 % flush 10 mL  10 mL Intravenous PRN Blayne Zabala MD        sodium chloride 0.9 % flush 10 mL  10 mL Intravenous Q12H Blayne Zabala MD   10 mL at 05/22/25 1527    sodium chloride 0.9 % flush 10 mL  10 mL Intravenous PRN Blayne Zabala MD        sodium chloride 0.9 % flush 10 mL  10 mL Intravenous Q12H Blayne Zabala MD        sodium chloride 0.9 % flush 10 mL  10 mL Intravenous PRN Blayne Zabala MD        sodium chloride 0.9 % infusion 40 mL  40 mL Intravenous PRN Blayne Zabala MD        sodium chloride 0.9 % infusion 40 mL  40 mL Intravenous PRN Blayne Zabala MD        sodium chloride 0.9 % infusion 500 mL  500 mL Intravenous Continuous Blayne Zabala MD        spironolactone (ALDACTONE) tablet 25 mg  25 mg Oral Daily Blayne Zabala MD   25 mg at 05/22/25 1525    tamsulosin (FLOMAX) 24 hr capsule 0.4 mg  0.4 mg Oral Nightly Blayne Zabala MD   0.4 mg at 05/21/25 2020    valsartan (DIOVAN) tablet 320 mg  320 mg Oral Daily Blayne Zabala MD   320 mg at 05/22/25 0102       Past Medical History:  Past Medical History:   Diagnosis Date    3-vessel CAD 08/11/2020    Allergic rhinitis     Anemia     Anxiety disorder 04/27/2020    Arthritis     Asymmetrical sensorineural hearing loss 06/28/2017    Atherosclerosis of native artery of both lower extremities with intermittent claudication 07/18/2019    Avascular necrosis of femoral head, left 07/11/2020    right hip after surgery    Carotid stenosis     Chronic mucoid otitis media     Chronic rhinitis     COPD (chronic obstructive pulmonary disease)     Coronary artery disease     HEART BYPASS 2004    Crohn's  disease of large intestine with other complication 07/30/2020    Chronic diarrhea Colonoscopy July 2020 revealed mild patchy scattered hemosiderin staining with inflammation more so in rectosigmoid area.  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    Iron deficiency anemia 07/14/2020    Mixed hearing loss of left ear     PAD (peripheral artery disease) 08/11/2020    Perianal abscess     Pernicious anemia 08/17/2020    took shots but never diagnosed with b12 deficiency    Personal history of alcoholism 08/11/2020    quit drinking in 2013    Prostatic hypertrophy 08/11/2020    Sensorineural hearing loss     Sepsis with acute renal failure 09/15/2020    Shortness of breath 05/27/2021    Tinnitus     Ventricular tachycardia, nonsustained 07/14/2020    Weight loss 07/11/2020       Past Surgical History:  Past Surgical History:   Procedure Laterality Date    AORTOGRAM Right 4/25/2025    Procedure: RIGHT LOWER EXTREMITY ANGIOGRAM, SHOCKWAVE LITHOTRIPSY, BALLOON ANGIOPLASTY, MYNX CLOSURE;  Surgeon: Gil Pineda DO;  Location: [facility] HYBRID OR;  Service: Vascular;  Laterality: Right;    ARTERY SURGERY  2021    right side on neck    CAROTID ENDARTERECTOMY Right 05/10/2021    Procedure: RIGHT CAROTID ENDARTERECTOMY WITH EEG;  Surgeon: Gil Pineda DO;  Location: [facility] HYBRID OR 12;  Service: Vascular;  Laterality: Right;    COLONOSCOPY N/A 07/02/2020    Procedure: COLONOSCOPY WITH ANESTHESIA;  Surgeon: Adrien Brewster MD;  Location: [facility] ENDOSCOPY;  Service: Gastroenterology;  Laterality: N/A;  pre op: diarrhea  post op: polyps  PCP: Joe Velasco  MD    COLONOSCOPY N/A 10/13/2020    Procedure: COLONOSCOPY WITH ANESTHESIA;  Surgeon: Adrien Brewster MD;  Location: [facility] ENDOSCOPY;  Service: Gastroenterology;  Laterality: N/A;  Pre: Chronic Diarrhea, Crohn's  Post: AVM  Dr. Neftali Velasco  CO2 Inflation Used    COLONOSCOPY N/A 12/08/2023    Procedure: COLONOSCOPY WITH ANESTHESIA;  Surgeon: Adrien Brewster MD;  Location: [facility] ENDOSCOPY;  Service: Gastroenterology;  Laterality: N/A;  pre chrone's disease  post sub optimal prep, polyp, chrone's      CORONARY ARTERY BYPASS GRAFT  2003    x3    ENDOSCOPY N/A 11/02/2021    Procedure: ESOPHAGOGASTRODUODENOSCOPY WITH ANESTHESIA;  Surgeon: Bridger Bell MD;  Location: [facility] ENDOSCOPY;  Service: Gastroenterology;  Laterality: N/A;  pre anemia;gi bleed  post  gi bleed;schatski ring  Dr. ERIC Velasco    ENDOSCOPY N/A 10/10/2023    Procedure: ESOPHAGOGASTRODUODENOSCOPY WITH ANESTHESIA;  Surgeon: Adrien Brewster MD;  Location: [facility] ENDOSCOPY;  Service: Gastroenterology;  Laterality: N/A;  preop; anemia  postop esophagitis ; R/O barretts   PCP Randall Beata    EYE SURGERY Bilateral     catorac    INCISION AND DRAINAGE PERIRECTAL ABSCESS N/A 03/03/2017    Procedure: INCISION AND DRAINAGE OF JEET ANAL ABSCESS;  Surgeon: Lynette Smith MD;  Location: [facility] OR;  Service:     INGUINAL HERNIA REPAIR Bilateral 06/27/2023    Procedure: INGUINAL HERNIA BILATERAL REPAIR LAPAROSCOPIC WITH DAVINCI ROBOT WITH MESH;  Surgeon: Tahira Rivera MD;  Location: [facility] OR;  Service: Robotics - DaVinci;  Laterality: Bilateral;    INSERTION HEMODIALYSIS CATHETER N/A 4/16/2025    Procedure: HEMODIALYSIS CATHETER PLACEMENT;  Surgeon: Gil Pineda DO;  Location: [facility] HYBRID OR;  Service: Vascular;  Laterality: N/A;    MYRINGOTOMY W/ TUBES Left 04/17/2017    06/10/2016    TONSILLECTOMY      TOTAL HIP ARTHROPLASTY Right 2006       Family History  Family History   Problem Relation Age of Onset    Breast cancer  Mother     Dementia Father     Glaucoma Father     No Known Problems Daughter     Colon polyps Neg Hx     Colon cancer Neg Hx        Social History  Social History     Socioeconomic History    Marital status:    Tobacco Use    Smoking status: Former     Current packs/day: 0.00     Average packs/day: 0.5 packs/day for 25.8 years (12.9 ttl pk-yrs)     Types: Cigarettes     Start date:      Quit date: 10/13/2013     Years since quittin.6     Passive exposure: Past    Smokeless tobacco: Never    Tobacco comments:     quit 2013   Vaping Use    Vaping status: Former    Substances: Nicotine    Devices: Pre-filled or refillable cartridge   Substance and Sexual Activity    Alcohol use: Not Currently    Drug use: No    Sexual activity: Not Currently     Partners: Female       Review of Systems:  History obtained from chart review and the patient  General ROS: No fever or chills  Respiratory ROS: No cough, shortness of breath, wheezing  Cardiovascular ROS: No chest pain or palpitations  Gastrointestinal ROS: No abdominal pain or melena  Genito-Urinary ROS: No dysuria or hematuria  Psych ROS: No anxiety and depression  14 point ROS reviewed with the patient and negative except as noted above and in the HPI unless unable to obtain.    Objective:  Patient Vitals for the past 24 hrs:   BP Temp Temp src Pulse Resp SpO2 Height Weight   25 1509 176/69 97.9 °F (36.6 °C) Axillary 78 16 98 % -- --   25 1441 178/64 98.2 °F (36.8 °C) Axillary 77 16 99 % -- --   25 1415 166/89 99.1 °F (37.3 °C) Temporal 71 16 97 % -- --   25 1410 (!) 183/100 -- -- 75 -- 96 % -- --   25 1400 (!) 183/77 -- -- 92 16 97 % -- --   25 1345 177/76 -- -- 88 16 98 % -- --   25 1330 170/82 -- -- 93 16 99 % -- --   25 1315 176/70 -- -- 71 16 98 % -- --   25 1300 167/71 -- -- 59 16 100 % -- --   25 1245 166/51 -- -- 63 14 95 % -- --   25 1230 145/52 -- -- 61 14 100 % -- --   25  "1225 135/49 -- -- 57 14 100 % -- --   05/22/25 1220 124/44 -- -- 52 14 100 % -- --   05/22/25 1217 124/44 99.3 °F (37.4 °C) Temporal 66 14 100 % -- --   05/22/25 0738 -- -- -- 56 16 99 % -- --   05/22/25 0703 -- -- -- 56 -- -- -- --   05/22/25 0658 -- -- -- 58 18 95 % -- --   05/22/25 0630 (!) 202/58 -- -- -- -- -- -- --   05/22/25 0600 -- -- -- -- -- -- -- 62 kg (136 lb 11.2 oz)   05/22/25 0426 (!) 182/43 98.3 °F (36.8 °C) Oral 58 18 95 % -- --   05/22/25 0100 164/45 -- -- -- -- -- -- --   05/22/25 0020 (!) 186/45 98.6 °F (37 °C) Oral 56 18 94 % -- --   05/21/25 2113 -- -- -- 57 18 97 % -- --   05/21/25 2108 -- -- -- 57 18 99 % -- --   05/21/25 1954 164/45 98.2 °F (36.8 °C) Oral 61 16 96 % 182.9 cm (72\") 62 kg (136 lb 11.2 oz)       Intake/Output Summary (Last 24 hours) at 5/22/2025 1605  Last data filed at 5/22/2025 1028  Gross per 24 hour   Intake 350 ml   Output 150 ml   Net 200 ml     General: awake/alert   Chest:  clear to auscultation bilaterally without respiratory distress  CVS: regular rate and rhythm  Abdominal: soft, nontender, positive bowel sounds  Extremities: no cyanosis or edema  Skin: warm/dry      Labs:  Results from last 7 days   Lab Units 05/21/25  1632   WBC 10*3/mm3 3.82   HEMOGLOBIN g/dL 7.3*   HEMATOCRIT % 22.2*   PLATELETS 10*3/mm3 83*         Results from last 7 days   Lab Units 05/21/25  1632   SODIUM mmol/L 137   POTASSIUM mmol/L 3.1*   CHLORIDE mmol/L 95*   CO2 mmol/L 30.0*   BUN mg/dL 14   CREATININE mg/dL 2.29*   CALCIUM mg/dL 8.5*   EGFR mL/min/1.73 28.7*   BILIRUBIN mg/dL 0.2   ALK PHOS U/L 110   ALT (SGPT) U/L 5   AST (SGOT) U/L 18   GLUCOSE mg/dL 99       Radiology:   Imaging Results (Last 72 Hours)       Procedure Component Value Units Date/Time    IR Angiogram Extremity [[id number]] Resulted: 05/22/25 0819     Updated: 05/22/25 1147    FL C Arm During Surgery [[id number]] Resulted: 05/22/25 1146     Updated: 05/22/25 1147    Narrative:      This procedure was auto-finalized with " "no dictation required.    US Venous Doppler Lower Extremity Left (duplex) [[id number]] Collected: 05/21/25 1817     Updated: 05/21/25 1820    Narrative:      History: Swelling       Impression:      Impression: There is no evidence of deep venous thrombosis or  superficial thrombophlebitis of the left lower extremity.     Comments: Left lower extremity venous duplex exam was performed using  color Doppler flow, Doppler wave form analysis, and grayscale imaging,  with and without compression. There is no evidence of deep venous  thrombosis of the common femoral, superficial femoral, popliteal,  posterior tibial, and peroneal veins. There is no thrombus identified in  the saphenofemoral junction or the greater saphenous vein.         This report was signed and finalized on 5/21/2025 6:17 PM by Blayne Zabala.       US Arterial Doppler Lower Extremity Left [[id number]] Collected: 05/21/25 1720     Updated: 05/21/25 1725    Narrative:      History: PAD     Comments: Grayscale imaging as well as color flow duplex were used to  evaluate left lower extremity arterial system.     On the left, the peak systolic velocity in the common femoral artery  measured 149.3 cm/s. In the profunda femoris artery measured 124.6 cm/s.  In the proximal SFA measured 60.8 cm/s. In the mid SFA measured 0 cm/s.  In the distal SFA measured 82.3 cm/s. In the popliteal artery measured  11.9 cm/s. In the posterior tibial artery measured 18.5 cm/s. In the  anterior tibial artery measured 36.5 cm/s.       Impression:      There is an occlusion of the left mid SFA with distal  reconstitution in the distal SFA along with diminished waveforms beyond  the level of occlusion. No flow visualized in the left DP. There is  monophasic waveforms in the left common femoral artery which may  indicate more proximal disease.     This report was signed and finalized on 5/21/2025 5:22 PM by Blayne Zabala.               Culture:  No results found for: \"BLOODCX\", " "\"URINECX\", \"WOUNDCX\", \"MRSACX\", \"RESPCX\", \"STOOLCX\"      Assessment   End-stage renal disease  Hypertension  Anemia and chronic kidney disease  Peripheral vascular disease  Thrombocytopenia  Hypokalemia    Plan:  Discussed with patient, nursing, family  Workup reviewed today  Monitor labs  Dialysis Monday Wednesday Friday per routine scheduling  Vascular evaluation reviewed as current, review op note when available      Twan Quiroz MD  5/22/2025  16:05 CDT      "

## 2025-05-22 NOTE — PROGRESS NOTES
[facility] Medicine Services  INPATIENT PROGRESS NOTE    Patient Name: Ricardo Hugo  Date of Admission: 5/21/2025  Today's Date: 05/22/25  Length of Stay: 1  Primary Care Physician: Nathan Zimmer MD    Subjective   Chief Complaint: Left lower extremity pain  HPI   I saw patient while he was still in the PACU.  He was getting ready to be moved back to the  medical [facility].  PACU nurse indicated that he did require quite a bit of pain medicine postoperatively including fentanyl, Dilaudid, and Percocet.  Patient was a bit drowsy, but easily arousable.  He voiced no other symptoms and reported having no shortness of breath.  It sounds like he may have had some urinary retention which required placement of a London catheter prior to his procedure as well.    Review of Systems   All pertinent negatives and positives are as above. All other systems have been reviewed and are negative unless otherwise stated.     Objective    Temp:  [98.2 °F (36.8 °C)-99.3 °F (37.4 °C)] 99.1 °F (37.3 °C)  Heart Rate:  [52-93] 71  Resp:  [14-18] 16  BP: (124-202)/() 166/89  Physical Exam  Vitals reviewed.   Constitutional:       Comments: A bit drowsy and groggy, but easily arousable   HENT:      Head: Normocephalic.      Mouth/Throat:      Mouth: Mucous membranes are dry.   Eyes:      Pupils: Pupils are equal, round, and reactive to light.   Pulmonary:      Effort: Pulmonary effort is normal. No respiratory distress.   Genitourinary:     Comments: London currently in place  Musculoskeletal:         General: Tenderness (LLE) present.   Skin:     Comments: Similar discoloration as compared to exam last night; dressing in place noted distal LLE (medial) with some oozing noted from dressing site.  Delay cap refill noted and had challenge palpation DP pulse   Neurological:      General: No focal deficit present.      Mental Status: He is alert.         Results Review:  I have reviewed the labs,  "radiology results, and diagnostic studies.    Laboratory Data:   Results from last 7 days   Lab Units 05/21/25  1632   WBC 10*3/mm3 3.82   HEMOGLOBIN g/dL 7.3*   HEMATOCRIT % 22.2*   PLATELETS 10*3/mm3 83*        Results from last 7 days   Lab Units 05/21/25  1632   SODIUM mmol/L 137   POTASSIUM mmol/L 3.1*   CHLORIDE mmol/L 95*   CO2 mmol/L 30.0*   BUN mg/dL 14   CREATININE mg/dL 2.29*   CALCIUM mg/dL 8.5*   BILIRUBIN mg/dL 0.2   ALK PHOS U/L 110   ALT (SGPT) U/L 5   AST (SGOT) U/L 18   GLUCOSE mg/dL 99       Culture Data:   No results found for: \"BLOODCX\", \"URINECX\", \"WOUNDCX\", \"MRSACX\", \"RESPCX\", \"STOOLCX\"    Radiology Data:   Imaging Results (Last 24 Hours)       Procedure Component Value Units Date/Time    IR Angiogram Extremity [[id number]] Resulted: 05/22/25 0819     Updated: 05/22/25 1147    FL C Arm During Surgery [[id number]] Resulted: 05/22/25 1146     Updated: 05/22/25 1147    Narrative:      This procedure was auto-finalized with no dictation required.    US Venous Doppler Lower Extremity Left (duplex) [[id number]] Collected: 05/21/25 1817     Updated: 05/21/25 1820    Narrative:      History: Swelling       Impression:      Impression: There is no evidence of deep venous thrombosis or  superficial thrombophlebitis of the left lower extremity.     Comments: Left lower extremity venous duplex exam was performed using  color Doppler flow, Doppler wave form analysis, and grayscale imaging,  with and without compression. There is no evidence of deep venous  thrombosis of the common femoral, superficial femoral, popliteal,  posterior tibial, and peroneal veins. There is no thrombus identified in  the saphenofemoral junction or the greater saphenous vein.         This report was signed and finalized on 5/21/2025 6:17 PM by Blayne Zabala.       US Arterial Doppler Lower Extremity Left [[id number]] Collected: 05/21/25 1720     Updated: 05/21/25 1725    Narrative:      History: PAD     Comments: Grayscale imaging " as well as color flow duplex were used to  evaluate left lower extremity arterial system.     On the left, the peak systolic velocity in the common femoral artery  measured 149.3 cm/s. In the profunda femoris artery measured 124.6 cm/s.  In the proximal SFA measured 60.8 cm/s. In the mid SFA measured 0 cm/s.  In the distal SFA measured 82.3 cm/s. In the popliteal artery measured  11.9 cm/s. In the posterior tibial artery measured 18.5 cm/s. In the  anterior tibial artery measured 36.5 cm/s.       Impression:      There is an occlusion of the left mid SFA with distal  reconstitution in the distal SFA along with diminished waveforms beyond  the level of occlusion. No flow visualized in the left DP. There is  monophasic waveforms in the left common femoral artery which may  indicate more proximal disease.     This report was signed and finalized on 5/21/2025 5:22 PM by Blayne Zabala.               I have reviewed the patient's current medications.     Assessment/Plan   Assessment  Active Hospital Problems    Diagnosis     **Arterial occlusion     Thrombocytopenia     Acute pain of left lower extremity     ESRD (end stage renal disease) on dialysis     Chronic diastolic (congestive) heart failure     CLL (chronic lymphocytic leukemia)     Anemia due to chronic kidney disease     Peripheral arterial disease     Essential hypertension        Treatment Plan  No labs were obtained this morning (they were ordered for 6am but the last labs I see were completed last PM)  I saw patient in the PACU and he was getting ready to be transported back to   Plan to get repeat labs including CBC and metabolic panel this afternoon  London catheter was placed in surgery; anticipate plan to d/c once bedrest measures have been completed  Currently off of Heparin IV gtt  Nephrology following - patient on dialysis every M-W-F  Currently also on ASA/Plavix/statin  Need to closely monitor Hgb and platelet trend; he recently required  transfusion of PRBCs in addition to Retacrit  Vascular following and appreciate their assistance; op report from today still pending     Medical Decision Making  Number and Complexity of problems: 2 acute; high complexity        Conditions and Status        Condition is unchanged     MDM Data  External documents reviewed: none  Cardiac tracing (EKG, telemetry) interpretation: no new EKGs  Radiology interpretation: no new radiology studies  Labs reviewed: as above  Any tests that were considered but not ordered: none     Decision rules/scores evaluated (example [id number], Wells, etc): none     Discussed with: patient and PACU nursing staff     Care Planning  Shared decision making: Discussed with patient with agreement to proceed with treatment plan as outlined  Code status and discussions: Full Code       Disposition  Social Determinants of Health that impact treatment or disposition: none apparent at this time  I expect the patient to be discharged to: D        Electronically signed by Dinesh Camarena MD, 05/22/25, 14:28 CDT.

## 2025-05-22 NOTE — PLAN OF CARE
Goal Outcome Evaluation:      Pt is A&Ox4 and afebrile. He went to surgery and came back at around 1430. Rest time was up at 1800 There are no pulses in his left foot and the site is weeping. MD is aware. Groin had some drainage and pressure was held. Pain controlled with PRN pain meds. Family is at bedside.

## 2025-05-23 LAB
ALBUMIN SERPL-MCNC: 3.5 G/DL (ref 3.5–5.2)
ALBUMIN/GLOB SERPL: 1.5 G/DL
ALP SERPL-CCNC: 116 U/L (ref 39–117)
ALT SERPL W P-5'-P-CCNC: 5 U/L (ref 1–41)
ANION GAP SERPL CALCULATED.3IONS-SCNC: 15 MMOL/L (ref 5–15)
AST SERPL-CCNC: 20 U/L (ref 1–40)
BASOPHILS # BLD AUTO: 0.08 10*3/MM3 (ref 0–0.2)
BASOPHILS NFR BLD AUTO: 1 % (ref 0–1.5)
BILIRUB SERPL-MCNC: 0.2 MG/DL (ref 0–1.2)
BUN SERPL-MCNC: 35 MG/DL (ref 8–23)
BUN/CREAT SERPL: 9.2 (ref 7–25)
CALCIUM SPEC-SCNC: 9.2 MG/DL (ref 8.6–10.5)
CHLORIDE SERPL-SCNC: 99 MMOL/L (ref 98–107)
CO2 SERPL-SCNC: 25 MMOL/L (ref 22–29)
CREAT SERPL-MCNC: 3.8 MG/DL (ref 0.76–1.27)
DEPRECATED RDW RBC AUTO: 69.8 FL (ref 37–54)
EGFRCR SERPLBLD CKD-EPI 2021: 15.6 ML/MIN/1.73
EOSINOPHIL # BLD AUTO: 0.41 10*3/MM3 (ref 0–0.4)
EOSINOPHIL NFR BLD AUTO: 4.9 % (ref 0.3–6.2)
ERYTHROCYTE [DISTWIDTH] IN BLOOD BY AUTOMATED COUNT: 18.4 % (ref 12.3–15.4)
GLOBULIN UR ELPH-MCNC: 2.3 GM/DL
GLUCOSE SERPL-MCNC: 105 MG/DL (ref 65–99)
HCT VFR BLD AUTO: 24.7 % (ref 37.5–51)
HGB BLD-MCNC: 7.7 G/DL (ref 13–17.7)
IMM GRANULOCYTES # BLD AUTO: 0.06 10*3/MM3 (ref 0–0.05)
IMM GRANULOCYTES NFR BLD AUTO: 0.7 % (ref 0–0.5)
LYMPHOCYTES # BLD AUTO: 0.97 10*3/MM3 (ref 0.7–3.1)
LYMPHOCYTES NFR BLD AUTO: 11.5 % (ref 19.6–45.3)
MCH RBC QN AUTO: 32.2 PG (ref 26.6–33)
MCHC RBC AUTO-ENTMCNC: 31.2 G/DL (ref 31.5–35.7)
MCV RBC AUTO: 103.3 FL (ref 79–97)
MONOCYTES # BLD AUTO: 1.06 10*3/MM3 (ref 0.1–0.9)
MONOCYTES NFR BLD AUTO: 12.6 % (ref 5–12)
NEUTROPHILS NFR BLD AUTO: 5.83 10*3/MM3 (ref 1.7–7)
NEUTROPHILS NFR BLD AUTO: 69.3 % (ref 42.7–76)
NRBC BLD AUTO-RTO: 0 /100 WBC (ref 0–0.2)
PLATELET # BLD AUTO: 100 10*3/MM3 (ref 140–450)
PMV BLD AUTO: 9.4 FL (ref 6–12)
POTASSIUM SERPL-SCNC: 3.2 MMOL/L (ref 3.5–5.2)
PROT SERPL-MCNC: 5.8 G/DL (ref 6–8.5)
RBC # BLD AUTO: 2.39 10*6/MM3 (ref 4.14–5.8)
SODIUM SERPL-SCNC: 139 MMOL/L (ref 136–145)
WBC NRBC COR # BLD AUTO: 8.41 10*3/MM3 (ref 3.4–10.8)

## 2025-05-23 PROCEDURE — 80053 COMPREHEN METABOLIC PANEL: CPT | Performed by: INTERNAL MEDICINE

## 2025-05-23 PROCEDURE — 25010000002 MORPHINE PER 10 MG: Performed by: STUDENT IN AN ORGANIZED HEALTH CARE EDUCATION/TRAINING PROGRAM

## 2025-05-23 PROCEDURE — 94799 UNLISTED PULMONARY SVC/PX: CPT

## 2025-05-23 PROCEDURE — 5A1D70Z PERFORMANCE OF URINARY FILTRATION, INTERMITTENT, LESS THAN 6 HOURS PER DAY: ICD-10-PCS | Performed by: SURGERY

## 2025-05-23 PROCEDURE — 85025 COMPLETE CBC W/AUTO DIFF WBC: CPT | Performed by: INTERNAL MEDICINE

## 2025-05-23 RX ORDER — HEPARIN SODIUM 1000 [USP'U]/ML
3600 INJECTION, SOLUTION INTRAVENOUS; SUBCUTANEOUS ONCE
Status: DISCONTINUED | OUTPATIENT
Start: 2025-05-23 | End: 2025-06-04

## 2025-05-23 RX ORDER — OXYCODONE AND ACETAMINOPHEN 5; 325 MG/1; MG/1
1 TABLET ORAL EVERY 6 HOURS PRN
Refills: 0 | Status: DISCONTINUED | OUTPATIENT
Start: 2025-05-23 | End: 2025-05-24

## 2025-05-23 RX ADMIN — CARVEDILOL 25 MG: 25 TABLET, FILM COATED ORAL at 17:52

## 2025-05-23 RX ADMIN — Medication 10 ML: at 20:32

## 2025-05-23 RX ADMIN — HYDRALAZINE HYDROCHLORIDE 100 MG: 50 TABLET ORAL at 16:01

## 2025-05-23 RX ADMIN — Medication 10 ML: at 11:02

## 2025-05-23 RX ADMIN — NIFEDIPINE 60 MG: 60 TABLET, FILM COATED, EXTENDED RELEASE ORAL at 11:00

## 2025-05-23 RX ADMIN — SPIRONOLACTONE 25 MG: 25 TABLET ORAL at 11:01

## 2025-05-23 RX ADMIN — ISOSORBIDE DINITRATE 10 MG: 10 TABLET ORAL at 11:00

## 2025-05-23 RX ADMIN — PANTOPRAZOLE SODIUM 40 MG: 40 TABLET, DELAYED RELEASE ORAL at 05:19

## 2025-05-23 RX ADMIN — MORPHINE SULFATE 1 MG: 2 INJECTION, SOLUTION INTRAMUSCULAR; INTRAVENOUS at 11:00

## 2025-05-23 RX ADMIN — ISOSORBIDE DINITRATE 10 MG: 10 TABLET ORAL at 17:52

## 2025-05-23 RX ADMIN — CLOPIDOGREL BISULFATE 75 MG: 75 TABLET, FILM COATED ORAL at 11:01

## 2025-05-23 RX ADMIN — CARVEDILOL 25 MG: 25 TABLET, FILM COATED ORAL at 11:01

## 2025-05-23 RX ADMIN — CLONIDINE HYDROCHLORIDE 0.3 MG: 0.1 TABLET ORAL at 14:35

## 2025-05-23 RX ADMIN — MORPHINE SULFATE 1 MG: 2 INJECTION, SOLUTION INTRAMUSCULAR; INTRAVENOUS at 20:14

## 2025-05-23 RX ADMIN — MORPHINE SULFATE 1 MG: 2 INJECTION, SOLUTION INTRAMUSCULAR; INTRAVENOUS at 04:41

## 2025-05-23 RX ADMIN — CLONIDINE HYDROCHLORIDE 0.3 MG: 0.1 TABLET ORAL at 11:00

## 2025-05-23 RX ADMIN — OXYCODONE HYDROCHLORIDE AND ACETAMINOPHEN 1 TABLET: 5; 325 TABLET ORAL at 14:35

## 2025-05-23 RX ADMIN — ATORVASTATIN CALCIUM 10 MG: 10 TABLET, FILM COATED ORAL at 11:01

## 2025-05-23 RX ADMIN — ASPIRIN 81 MG: 81 TABLET, COATED ORAL at 11:01

## 2025-05-23 RX ADMIN — TAMSULOSIN HYDROCHLORIDE 0.4 MG: 0.4 CAPSULE ORAL at 20:32

## 2025-05-23 RX ADMIN — VALSARTAN 320 MG: 80 TABLET, FILM COATED ORAL at 11:00

## 2025-05-23 RX ADMIN — MONTELUKAST SODIUM 10 MG: 10 TABLET, COATED ORAL at 20:32

## 2025-05-23 RX ADMIN — MORPHINE SULFATE 1 MG: 2 INJECTION, SOLUTION INTRAMUSCULAR; INTRAVENOUS at 16:01

## 2025-05-23 RX ADMIN — HYDRALAZINE HYDROCHLORIDE 100 MG: 50 TABLET ORAL at 11:01

## 2025-05-23 RX ADMIN — ISOSORBIDE DINITRATE 10 MG: 10 TABLET ORAL at 14:35

## 2025-05-23 RX ADMIN — CALCITRIOL CAPSULES 0.25 MCG 0.5 MCG: 0.25 CAPSULE ORAL at 11:01

## 2025-05-23 RX ADMIN — LEVOTHYROXINE SODIUM 100 MCG: 0.1 TABLET ORAL at 05:19

## 2025-05-23 RX ADMIN — IPRATROPIUM BROMIDE AND ALBUTEROL SULFATE 3 ML: .5; 3 SOLUTION RESPIRATORY (INHALATION) at 14:28

## 2025-05-23 RX ADMIN — MORPHINE SULFATE 1 MG: 2 INJECTION, SOLUTION INTRAMUSCULAR; INTRAVENOUS at 00:05

## 2025-05-23 NOTE — PROGRESS NOTES
Nephrology ([facility]) Progress Note      Patient:  Ricardo Hugo  YOB: 1950  Date of Service: 5/23/2025  MRN: [id number]   Acct: [id number]   Primary Care Physician: Nathan Zimmer MD  Advance Directive:   Code Status and Medical Interventions: CPR (Attempt to Resuscitate); Full Support   Ordered at: 05/21/25 1822     Code Status (Patient has no pulse and is not breathing):    CPR (Attempt to Resuscitate)     Medical Interventions (Patient has pulse or is breathing):    Full Support     Admit Date: 5/21/2025       Hospital Day: 2  Referring Provider: No ref. provider found      Patient personally seen and examined.  Complete chart including Consults, Notes, Operative Reports, Labs, Cardiology, and Radiology studies reviewed as able.        Subjective:  Ricardo Hugo is a 75 y.o. male for whom we were consulted for evaluation and treatment of end-stage renal disease. He is a Monday Wednesday Friday dialysis patient. He presented for evaluation of left leg pain and was found to have an arterial blockage. He had undergone an angiogram last month and apparently had a retained balloon by chart review that required a second procedure to retrieve. He was taken to the OR by Dr. Zabala for intervention. He was seen immediately after the OR. He was seen with family. He is complaining of pain in the operative leg. Denied other complaint upon questioning. Denied any issues with dialysis. Denied current chest pain, shortness of air at rest, nausea or vomiting.     Today, no overnight events reported.  Patient seen on dialysis.  Denied any complaints including chest pain, shortness of air at rest, nausea or vomiting.    Dialysis   Patient was seen and evaluated on renal replacement therapy and I have personally evaluated the patient and directed the therapy  Modality: Hemodialysis  Access: Catheter  Location: right upper  QB: 450  QD: 700  UF: 900      Allergies:  Ondansetron,  Zofran [ondansetron hcl], Lortab [hydrocodone-acetaminophen], and Allopurinol    Home Meds:  Medications Prior to Admission   Medication Sig Dispense Refill Last Dose/Taking    ALPRAZolam (XANAX) 0.25 MG tablet Take 1 tablet by mouth Every Night.   Taking    aspirin (aspirin) 81 MG EC tablet Take 1 tablet by mouth Daily.   Taking    atorvastatin (LIPITOR) 10 MG tablet Take 1 tablet by mouth Daily. 90 tablet 3 Taking    calcitriol (ROCALTROL) 0.5 MCG capsule Take 1 capsule by mouth Daily. 90 capsule 2 Taking    carvedilol (COREG) 25 MG tablet Take 1 tablet by mouth 2 (Two) Times a Day With Meals.   Taking    cholestyramine light 4 g packet Take 1 packet by mouth Daily. 90 packet 1 Taking    cloNIDine (CATAPRES) 0.3 MG tablet Take 1 tablet by mouth 3 times a day.   Taking    clopidogrel (PLAVIX) 75 MG tablet Take 1 tablet by mouth Daily.   Taking    Copper Gluconate (Copper Caps) 2 MG capsule Take 2 mg by mouth Daily.   Taking    empagliflozin (Jardiance) 10 MG tablet tablet Take 1 tablet by mouth Daily.   Taking    fexofenadine (ALLEGRA) 180 MG tablet Take 1 tablet by mouth Daily.   Taking    furosemide (LASIX) 40 MG tablet Take 1 tablet by mouth Daily. 90 tablet 2 Taking    hydrALAZINE (APRESOLINE) 100 MG tablet Take 1 tablet by mouth 3 (Three) Times a Day. 100mg daily   Taking    isosorbide dinitrate (ISORDIL) 10 MG tablet Take 1 tablet by mouth 3 (Three) Times a Day. 270 tablet 2 Taking    levothyroxine (Synthroid) 100 MCG tablet Take 1 tablet by mouth Every Morning. 90 tablet 2 Taking    magnesium chloride ER 64 MG DR tablet Take 1 tablet by mouth Daily.   Taking    Melatonin 10 MG tablet Take 1 tablet by mouth Every Night.   Taking    mesalamine (APRISO) 0.375 g 24 hr capsule Take 4 capsules by mouth Daily. 360 capsule 1 Taking    montelukast (SINGULAIR) 10 MG tablet Take 1 tablet by mouth Every Night.   Taking    Multiple Vitamins-Minerals (PRESERVISION/LUTEIN) capsule Take 1 capsule by mouth 2 (two) times a  day.   Taking    NIFEdipine XL (PROCARDIA XL) 60 MG 24 hr tablet Take 1 tablet by mouth Daily.   Taking    omeprazole (priLOSEC) 20 MG capsule Take 1 capsule by mouth Daily.   Taking    sodium bicarbonate 650 MG tablet Take 1 tablet by mouth 5 (Five) Times a Day.   Taking    spironolactone (ALDACTONE) 25 MG tablet Take 1 tablet by mouth Daily.   Taking    tamsulosin (FLOMAX) 0.4 MG capsule 24 hr capsule Take 1 capsule by mouth Every Night.   Taking    valsartan (DIOVAN) 320 MG tablet Take 1 tablet by mouth Daily.   Taking    vitamin D (ERGOCALCIFEROL) 1.25 MG (59115 UT) capsule capsule Take 1 capsule by mouth 1 (One) Time Per Week. Mondays   Taking    albuterol sulfate  (90 Base) MCG/ACT inhaler Inhale 2 puffs Every 6 (Six) Hours As Needed for Wheezing or Shortness of Air.       aspirin-acetaminophen-caffeine (EXCEDRIN MIGRAINE) 250-250-65 MG per tablet Take 1 tablet by mouth Every 6 (Six) Hours As Needed for Headache. (Patient taking differently: Take 3 tablets by mouth Every 6 (Six) Hours As Needed for Headache. Patient reports he takes 6-12 every day.)       Budeson-Glycopyrrol-Formoterol (Breztri Aerosphere) 160-9-4.8 MCG/ACT aerosol inhaler Inhale 2 puffs 2 (Two) Times a Day.       desoximetasone (TOPICORT) 0.25 % cream Apply 1 Application topically to the appropriate area as directed 2 (Two) Times a Day As Needed for Irritation. irritation       diphenhydrAMINE HCl, Sleep, 50 MG/30ML liquid Take 15 mL by mouth At Night As Needed (insomnia).       docusate sodium (COLACE) 100 MG capsule Take 1 capsule by mouth 3 (Three) Times a Day As Needed for Constipation.       fluticasone (FLONASE) 50 MCG/ACT nasal spray Administer 2 sprays into the nostril(s) as directed by provider Daily As Needed for Allergies.          Medicines:  Current Facility-Administered Medications   Medication Dose Route Frequency Provider Last Rate Last Admin    ALPRAZolam (XANAX) tablet 0.25 mg  0.25 mg Oral Nightly PRN Vj,  MD Blayne   0.25 mg at 05/22/25 0047    aspirin EC tablet 81 mg  81 mg Oral Daily Blayne Zabala MD   81 mg at 05/22/25 1527    atorvastatin (LIPITOR) tablet 10 mg  10 mg Oral Daily Blayne Zabala MD   10 mg at 05/22/25 1526    sennosides-docusate (PERICOLACE) 8.6-50 MG per tablet 2 tablet  2 tablet Oral BID PRN Blayne Zabala MD        And    polyethylene glycol (MIRALAX) packet 17 g  17 g Oral Daily PRN Blayne Zabala MD        And    bisacodyl (DULCOLAX) EC tablet 5 mg  5 mg Oral Daily PRN Blayne Zabala MD        And    bisacodyl (DULCOLAX) suppository 10 mg  10 mg Rectal Daily PRN Blayne Zabala MD        calcitriol (ROCALTROL) capsule 0.5 mcg  0.5 mcg Oral Daily Blayne Zabala MD   0.5 mcg at 05/22/25 1526    carvedilol (COREG) tablet 25 mg  25 mg Oral BID With Meals Blayne Zabala MD   25 mg at 05/22/25 1804    cloNIDine (CATAPRES) tablet 0.3 mg  0.3 mg Oral TID Blayne Zabala MD   0.3 mg at 05/22/25 2222    clopidogrel (PLAVIX) tablet 75 mg  75 mg Oral Daily Blayne Zabala MD   75 mg at 05/22/25 1526    heparin (porcine) injection 3,600 Units  3,600 Units Intravenous Once Twan Quiroz MD        hydrALAZINE (APRESOLINE) injection 10 mg  10 mg Intravenous Q6H PRN Blayne Zabala MD   10 mg at 05/22/25 0654    hydrALAZINE (APRESOLINE) tablet 100 mg  100 mg Oral TID Blayne Zabala MD   100 mg at 05/22/25 2222    ipratropium-albuterol (DUO-NEB) nebulizer solution 3 mL  3 mL Nebulization 4x Daily - RT Blayne Zabala MD   3 mL at 05/22/25 1855    isosorbide dinitrate (ISORDIL) tablet 10 mg  10 mg Oral TID - Nitrates Blayne Zabala MD   10 mg at 05/22/25 1804    levothyroxine (SYNTHROID, LEVOTHROID) tablet 100 mcg  100 mcg Oral Q AM Blayne Zabala MD   100 mcg at 05/23/25 0519    montelukast (SINGULAIR) tablet 10 mg  10 mg Oral Nightly Blayne Zabala MD   10 mg at 05/22/25 2223    Morphine sulfate (PF) injection 1 mg  1 mg Intravenous Q4H PRN Blayne Zabala MD   1  mg at 05/23/25 0441    NIFEdipine XL (PROCARDIA XL) 24 hr tablet 60 mg  60 mg Oral Daily Blayne Zabala MD   60 mg at 05/22/25 0536    nitroglycerin (NITROSTAT) SL tablet 0.4 mg  0.4 mg Sublingual Q5 Min PRN Blayne Zabala MD        pantoprazole (PROTONIX) EC tablet 40 mg  40 mg Oral Q AM Blayne Zabala MD   40 mg at 05/23/25 0519    prochlorperazine (COMPAZINE) injection 5 mg  5 mg Intravenous Q6H PRN Pancho Camacho MD        Or    prochlorperazine (COMPAZINE) tablet 5 mg  5 mg Oral Q6H PRN Pancho Camacho MD        Or    prochlorperazine (COMPAZINE) suppository 25 mg  25 mg Rectal Q12H PRN Pancho Camacho MD        promethazine (PHENERGAN) 12.5 mg in sodium chloride 0.9 % 50 mL  12.5 mg Intravenous Q6H PRN Pancho Camacho MD        sodium chloride 0.9 % flush 10 mL  10 mL Intravenous PRN Blayne Zabala MD        sodium chloride 0.9 % flush 10 mL  10 mL Intravenous Q12H Blayne Zabala MD   10 mL at 05/22/25 1527    sodium chloride 0.9 % flush 10 mL  10 mL Intravenous PRN Blayne Zabala MD        sodium chloride 0.9 % flush 10 mL  10 mL Intravenous Q12H Blayne Zabala MD   10 mL at 05/22/25 2223    sodium chloride 0.9 % flush 10 mL  10 mL Intravenous PRN Blayne Zabala MD        sodium chloride 0.9 % infusion 40 mL  40 mL Intravenous PRN Blayne Zabala MD        sodium chloride 0.9 % infusion 40 mL  40 mL Intravenous PRN Blayne Zabala MD        spironolactone (ALDACTONE) tablet 25 mg  25 mg Oral Daily Blayne Zabala MD   25 mg at 05/22/25 1525    tamsulosin (FLOMAX) 24 hr capsule 0.4 mg  0.4 mg Oral Nightly Blayne Zabala MD   0.4 mg at 05/22/25 2223    valsartan (DIOVAN) tablet 320 mg  320 mg Oral Daily Blayne Zabala MD   320 mg at 05/22/25 0102       Past Medical History:  Past Medical History:   Diagnosis Date    3-vessel CAD 08/11/2020    Allergic rhinitis     Anemia     Anxiety disorder 04/27/2020    Arthritis     Asymmetrical sensorineural hearing loss 06/28/2017     Atherosclerosis of native artery of both lower extremities with intermittent claudication 07/18/2019    Avascular necrosis of femoral head, left 07/11/2020    right hip after surgery    Carotid stenosis     Chronic mucoid otitis media     Chronic rhinitis     COPD (chronic obstructive pulmonary disease)     Coronary artery disease     HEART BYPASS 2004    Crohn's disease of large intestine with other complication 07/30/2020    Chronic diarrhea Colonoscopy July 2020 revealed mild patchy scattered hemosiderin staining with inflammation more so in rectosigmoid area.  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    Iron deficiency anemia 07/14/2020    Mixed hearing loss of left ear     PAD (peripheral artery disease) 08/11/2020    Perianal abscess     Pernicious anemia 08/17/2020    took shots but never diagnosed with b12 deficiency    Personal history of alcoholism 08/11/2020    quit drinking in 2013    Prostatic hypertrophy 08/11/2020    Sensorineural hearing loss     Sepsis with acute renal failure 09/15/2020    Shortness of breath 05/27/2021    Tinnitus     Ventricular tachycardia, nonsustained 07/14/2020    Weight loss 07/11/2020       Past Surgical History:  Past Surgical History:   Procedure Laterality Date    AORTOGRAM Right 4/25/2025    Procedure: RIGHT LOWER EXTREMITY ANGIOGRAM, SHOCKWAVE LITHOTRIPSY, BALLOON ANGIOPLASTY, MYNX CLOSURE;  Surgeon: Gil Pineda DO;  Location: [facility] OR;  Service: Vascular;  Laterality: Right;    AORTOGRAM Left 5/22/2025    Procedure: LEFT LOWER EXTREMITY ANGIOGRAM, SHOCKWAVE LITHOTRIPSY, BALLOON ANGIOPLASTY, STENT  PLACEMENT, MYNX CLOSURE;  Surgeon: Blayne Zabala MD;  Location: [facility] HYBRID OR;  Service: Vascular;  Laterality: Left;    ARTERY SURGERY  2021    right side on neck    CAROTID ENDARTERECTOMY Right 05/10/2021    Procedure: RIGHT CAROTID ENDARTERECTOMY WITH EEG;  Surgeon: Gil Pineda DO;  Location: [facility] HYBRID OR 12;  Service: Vascular;  Laterality: Right;    COLONOSCOPY N/A 07/02/2020    Procedure: COLONOSCOPY WITH ANESTHESIA;  Surgeon: Adrien Brewster MD;  Location: [facility] ENDOSCOPY;  Service: Gastroenterology;  Laterality: N/A;  pre op: diarrhea  post op: polyps  PCP: Joe Velasco MD    COLONOSCOPY N/A 10/13/2020    Procedure: COLONOSCOPY WITH ANESTHESIA;  Surgeon: Adrien Brewster MD;  Location: [facility] ENDOSCOPY;  Service: Gastroenterology;  Laterality: N/A;  Pre: Chronic Diarrhea, Crohn's  Post: AVM  Dr. Neftali Velasco  CO2 Inflation Used    COLONOSCOPY N/A 12/08/2023    Procedure: COLONOSCOPY WITH ANESTHESIA;  Surgeon: Adrien Brewster MD;  Location: [facility] ENDOSCOPY;  Service: Gastroenterology;  Laterality: N/A;  pre chrone's disease  post sub optimal prep, polyp, chrone's      CORONARY ARTERY BYPASS GRAFT  2003    x3    ENDOSCOPY N/A 11/02/2021    Procedure: ESOPHAGOGASTRODUODENOSCOPY WITH ANESTHESIA;  Surgeon: Bridger Bell MD;  Location: [facility] ENDOSCOPY;  Service: Gastroenterology;  Laterality: N/A;  pre anemia;gi bleed  post  gi bleed;schatski ring  Dr. ERIC Velasco    ENDOSCOPY N/A 10/10/2023    Procedure: ESOPHAGOGASTRODUODENOSCOPY WITH ANESTHESIA;  Surgeon: Adrien Brewster MD;  Location: [facility] ENDOSCOPY;  Service: Gastroenterology;  Laterality: N/A;  preop; anemia  postop esophagitis ; R/O barretts   PCP Randall Beata    EYE SURGERY Bilateral     catorac    INCISION AND DRAINAGE PERIRECTAL ABSCESS N/A 03/03/2017    Procedure: INCISION AND DRAINAGE OF JEET ANAL ABSCESS;  Surgeon: Lynette Smith MD;  Location: [facility] OR;  Service:     INGUINAL HERNIA REPAIR  Bilateral 2023    Procedure: INGUINAL HERNIA BILATERAL REPAIR LAPAROSCOPIC WITH DAVINCI ROBOT WITH MESH;  Surgeon: Tahira Rivera MD;  Location:  [facility] OR;  Service: Robotics - DaVinci;  Laterality: Bilateral;    INSERTION HEMODIALYSIS CATHETER N/A 2025    Procedure: HEMODIALYSIS CATHETER PLACEMENT;  Surgeon: Gil Pineda DO;  Location:  [facility] HYBRID OR;  Service: Vascular;  Laterality: N/A;    MYRINGOTOMY W/ TUBES Left 2017    06/10/2016    TONSILLECTOMY      TOTAL HIP ARTHROPLASTY Right        Family History  Family History   Problem Relation Age of Onset    Breast cancer Mother     Dementia Father     Glaucoma Father     No Known Problems Daughter     Colon polyps Neg Hx     Colon cancer Neg Hx        Social History  Social History     Socioeconomic History    Marital status:    Tobacco Use    Smoking status: Former     Current packs/day: 0.00     Average packs/day: 0.5 packs/day for 25.8 years (12.9 ttl pk-yrs)     Types: Cigarettes     Start date:      Quit date: 10/13/2013     Years since quittin.6     Passive exposure: Past    Smokeless tobacco: Never    Tobacco comments:     quit    Vaping Use    Vaping status: Former    Substances: Nicotine    Devices: Pre-filled or refillable cartridge   Substance and Sexual Activity    Alcohol use: Not Currently    Drug use: No    Sexual activity: Not Currently     Partners: Female       Review of Systems:  History obtained from chart review and the patient  General ROS: No fever or chills  Respiratory ROS: No cough, shortness of breath, wheezing  Cardiovascular ROS: No chest pain or palpitations  Gastrointestinal ROS: No abdominal pain or melena  Genito-Urinary ROS: No dysuria or hematuria  Psych ROS: No anxiety and depression  14 point ROS reviewed with the patient and negative except as noted above and in the HPI unless unable to obtain.    Objective:  Patient Vitals for the past 24 hrs:   BP Temp Temp src Pulse  Resp SpO2 Weight   05/23/25 0500 175/60 98.7 °F (37.1 °C) Oral 74 18 96 % 64.6 kg (142 lb 6.4 oz)   05/22/25 2357 165/49 98.8 °F (37.1 °C) Oral 63 18 94 % --   05/22/25 1949 167/53 -- Oral 80 18 96 % --   05/22/25 1901 -- -- -- 78 16 96 % --   05/22/25 1855 -- -- -- 83 16 98 % --   05/22/25 1610 176/60 98.8 °F (37.1 °C) Oral 76 16 96 % --   05/22/25 1509 176/69 97.9 °F (36.6 °C) Axillary 78 16 98 % --   05/22/25 1441 178/64 98.2 °F (36.8 °C) Axillary 77 16 99 % --   05/22/25 1415 166/89 99.1 °F (37.3 °C) Temporal 71 16 97 % --   05/22/25 1410 (!) 183/100 -- -- 75 -- 96 % --   05/22/25 1400 (!) 183/77 -- -- 92 16 97 % --   05/22/25 1345 177/76 -- -- 88 16 98 % --   05/22/25 1330 170/82 -- -- 93 16 99 % --   05/22/25 1315 176/70 -- -- 71 16 98 % --   05/22/25 1300 167/71 -- -- 59 16 100 % --   05/22/25 1245 166/51 -- -- 63 14 95 % --   05/22/25 1230 145/52 -- -- 61 14 100 % --   05/22/25 1225 135/49 -- -- 57 14 100 % --   05/22/25 1220 124/44 -- -- 52 14 100 % --   05/22/25 1217 124/44 99.3 °F (37.4 °C) Temporal 66 14 100 % --       Intake/Output Summary (Last 24 hours) at 5/23/2025 1038  Last data filed at 5/23/2025 0500  Gross per 24 hour   Intake --   Output 700 ml   Net -700 ml     General: awake/alert   Chest:  clear to auscultation bilaterally without respiratory distress  CVS: regular rate and rhythm  Abdominal: soft, nontender, positive bowel sounds  Extremities: no cyanosis or edema  Skin: warm and dry without rash      Labs:  Results from last 7 days   Lab Units 05/23/25  0303 05/22/25  1624 05/21/25  1632   WBC 10*3/mm3 8.41 8.83 3.82   HEMOGLOBIN g/dL 7.7* 7.5* 7.3*   HEMATOCRIT % 24.7* 24.2* 22.2*   PLATELETS 10*3/mm3 100* 105* 83*         Results from last 7 days   Lab Units 05/23/25  0303 05/22/25  1624 05/21/25  1632   SODIUM mmol/L 139 140 137   POTASSIUM mmol/L 3.2* 3.3* 3.1*   CHLORIDE mmol/L 99 100 95*   CO2 mmol/L 25.0 25.0 30.0*   BUN mg/dL 35* 29* 14   CREATININE mg/dL 3.80* 3.16* 2.29*    CALCIUM mg/dL 9.2 9.0 8.5*   EGFR mL/min/1.73 15.6* 19.5* 28.7*   BILIRUBIN mg/dL 0.2  --  0.2   ALK PHOS U/L 116  --  110   ALT (SGPT) U/L 5  --  5   AST (SGOT) U/L 20  --  18   GLUCOSE mg/dL 105* 93 99       Radiology:   Imaging Results (Last 72 Hours)       Procedure Component Value Units Date/Time    IR Angiogram Extremity [[id number]] Collected: 05/23/25 0643     Updated: 05/23/25 0643    Narrative:      Performed by Dr. Zabala. Please see procedure note.       FL C Arm During Surgery [[id number]] Resulted: 05/22/25 1146     Updated: 05/22/25 1147    Narrative:      This procedure was auto-finalized with no dictation required.    US Venous Doppler Lower Extremity Left (duplex) [[id number]] Collected: 05/21/25 1817     Updated: 05/21/25 1820    Narrative:      History: Swelling       Impression:      Impression: There is no evidence of deep venous thrombosis or  superficial thrombophlebitis of the left lower extremity.     Comments: Left lower extremity venous duplex exam was performed using  color Doppler flow, Doppler wave form analysis, and grayscale imaging,  with and without compression. There is no evidence of deep venous  thrombosis of the common femoral, superficial femoral, popliteal,  posterior tibial, and peroneal veins. There is no thrombus identified in  the saphenofemoral junction or the greater saphenous vein.         This report was signed and finalized on 5/21/2025 6:17 PM by Blayne Zabala.       US Arterial Doppler Lower Extremity Left [[id number]] Collected: 05/21/25 1720     Updated: 05/21/25 1725    Narrative:      History: [facility]     Comments: Grayscale imaging as well as color flow duplex were used to  evaluate left lower extremity arterial system.     On the left, the peak systolic velocity in the common femoral artery  measured 149.3 cm/s. In the profunda femoris artery measured 124.6 cm/s.  In the proximal SFA measured 60.8 cm/s. In the mid SFA measured 0 cm/s.  In the distal SFA measured  "82.3 cm/s. In the popliteal artery measured  11.9 cm/s. In the posterior tibial artery measured 18.5 cm/s. In the  anterior tibial artery measured 36.5 cm/s.       Impression:      There is an occlusion of the left mid SFA with distal  reconstitution in the distal SFA along with diminished waveforms beyond  the level of occlusion. No flow visualized in the left DP. There is  monophasic waveforms in the left common femoral artery which may  indicate more proximal disease.     This report was signed and finalized on 5/21/2025 5:22 PM by Blayne Zabala.               Culture:  No results found for: \"BLOODCX\", \"URINECX\", \"WOUNDCX\", \"MRSACX\", \"RESPCX\", \"STOOLCX\"      Assessment   End-stage renal disease  Hypertension  Anemia and chronic kidney disease  Peripheral vascular disease  Thrombocytopenia  Hypokalemia     Plan:  Discussed with patient, nursing, family  Workup reviewed today  Monitor labs  Dialysis Monday Wednesday Friday per routine scheduling  Vascular evaluation reviewed as current, review op note when available  Adjust blood pressure medications as needed postdialysis      Twan Quiroz MD  5/23/2025  10:38 CDT      "

## 2025-05-23 NOTE — PROGRESS NOTES
"    [facility] Medicine Services  INPATIENT PROGRESS NOTE    Patient Name: Ricardo Hugo  Date of Admission: 5/21/2025  Today's Date: 05/23/25  Length of Stay: 2  Primary Care Physician: Nathan Zimmer MD    Subjective   Chief Complaint: Left lower extremity pain  HPI   Patient had dialysis earlier this morning.  He continues to report having pain in his distal left lower extremity.  He just received a dose of morphine not too long prior to my arrival, and he indicates that when the pain medication wears off his pain is \"terrific\".  I asked to clarify if terrific should be interpreted as significant pain and he responded \"yes.\"      Review of Systems   All pertinent negatives and positives are as above. All other systems have been reviewed and are negative unless otherwise stated.     Objective    Temp:  [97.9 °F (36.6 °C)-99.1 °F (37.3 °C)] 98.4 °F (36.9 °C)  Heart Rate:  [63-93] 77  Resp:  [16-18] 18  BP: (162-183)/() 162/53  Physical Exam  Vitals reviewed.   Constitutional:       General: He is not in acute distress.     Appearance: He is not toxic-appearing.   HENT:      Head: Normocephalic.   Eyes:      Pupils: Pupils are equal, round, and reactive to light.   Pulmonary:      Effort: Pulmonary effort is normal. No respiratory distress.   Musculoskeletal:         General: Tenderness (LLE) present.   Skin:     General: Skin is warm.      Comments: Heating blanket in place to lower extremities. Similar discoloration left foot; unable to palpate DP pulse. Bandage in place over PT site making palpation of pulse difficult.  Delayed cap refill noted.  Dr. Zabala also examined patient while I was in room; some oozing noted at groin site from procedure yesterday   Neurological:      General: No focal deficit present.      Mental Status: He is alert.         Results Review:  I have reviewed the labs, radiology results, and diagnostic studies.    Laboratory Data:   Results from " "last 7 days   Lab Units 05/23/25  0303 05/22/25  1624 05/21/25  1632   WBC 10*3/mm3 8.41 8.83 3.82   HEMOGLOBIN g/dL 7.7* 7.5* 7.3*   HEMATOCRIT % 24.7* 24.2* 22.2*   PLATELETS 10*3/mm3 100* 105* 83*        Results from last 7 days   Lab Units 05/23/25  0303 05/22/25  1624 05/21/25  1632   SODIUM mmol/L 139 140 137   POTASSIUM mmol/L 3.2* 3.3* 3.1*   CHLORIDE mmol/L 99 100 95*   CO2 mmol/L 25.0 25.0 30.0*   BUN mg/dL 35* 29* 14   CREATININE mg/dL 3.80* 3.16* 2.29*   CALCIUM mg/dL 9.2 9.0 8.5*   BILIRUBIN mg/dL 0.2  --  0.2   ALK PHOS U/L 116  --  110   ALT (SGPT) U/L 5  --  5   AST (SGOT) U/L 20  --  18   GLUCOSE mg/dL 105* 93 99       Culture Data:   No results found for: \"BLOODCX\", \"URINECX\", \"WOUNDCX\", \"MRSACX\", \"RESPCX\", \"STOOLCX\"    Radiology Data:   Imaging Results (Last 24 Hours)       Procedure Component Value Units Date/Time    IR Angiogram Extremity [[id number]] Collected: 05/23/25 0643     Updated: 05/23/25 1220            I have reviewed the patient's current medications.     Assessment/Plan   Assessment  Active Hospital Problems    Diagnosis     **Arterial occlusion     Thrombocytopenia     Acute pain of left lower extremity     ESRD (end stage renal disease) on dialysis     Chronic diastolic (congestive) heart failure     CLL (chronic lymphocytic leukemia)     Anemia due to chronic kidney disease     Peripheral arterial disease     Essential hypertension        Treatment Plan  Discussed with Dr. Zabala  S/p dialysis earlier this AM  Nephrology following   Currently also on ASA/Plavix/statin  Repeat CBC in AM  Need to closely monitor Hgb and platelet trend; he recently required transfusion of PRBCs in addition to Retacrit at the end of April.  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  His daughter told me today that he was caught taking a couple of his own Excedrin during this hospital " stay.  Would like to see if we can transition off of IV pain medication and see if we can keep symptoms controlled with PO pain meds.  Continue current anti-HTN meds and monitor BP trend  Dispo: in my discussion with Dr. Zabala this afternoon they are in process of outlining potential next steps from a vascular perspective     Medical Decision Making  Number and Complexity of problems: 2 acute; high complexity        Conditions and Status        Condition is unchanged     MDM Data  External documents reviewed: none  Cardiac tracing (EKG, telemetry) interpretation: no new EKGs  Radiology interpretation: no new radiology studies  Labs reviewed: as above  Any tests that were considered but not ordered: none     Decision rules/scores evaluated (example [id number], Wells, etc): none     Discussed with: patient, daughter, Dr. Zabala and Elena with Vascular Surgery     Care Planning  Shared decision making: Discussed with patient with agreement to proceed with treatment plan as outlined  Code status and discussions: Full Code       Disposition  Social Determinants of Health that impact treatment or disposition: none apparent at this time  I expect the patient to be discharged to home possibly this weekend if pain adequately controlled and no additional Vascular intervention planned during this hospitalization.        Electronically signed by Dinesh Camarena MD, 05/23/25, 13:11 CDT.

## 2025-05-23 NOTE — PLAN OF CARE
Goal Outcome Evaluation:      Pt is A&Ox4 and afebrile. HE went to dialysis and they got 2L off. Groin site was bleeding, dressing changed and MD notified. Pain controlled with PRN pain meds. Family at bedside.

## 2025-05-23 NOTE — PROGRESS NOTES
LOS: 2 days   Patient Care Team:  Nathan Zimmer MD as PCP - General (Internal Medicine)  Adrien Brewster MD as Consulting Physician (Gastroenterology)  Eleuterio Farley MD (Inactive) as Cardiologist (Cardiology)  Elena Jon APRN as Referring Physician (Vascular Surgery)  Bolivar Rueda MD as Consulting Physician (Nephrology)  Slava Tate APRN as Nurse Practitioner (Family Medicine)  New Omalley MD as Consulting Physician (Pulmonary Disease)  Becky Camacho APRN as Nurse Practitioner (Hematology and Oncology)  Gil Pineda DO as Consulting Physician (Vascular Surgery)    Chief Complaint: Left foot pain    Subjective     Patient Complaints: Patient reports left foot pain improved at the time of the exam.  However patient still requiring significant amounts of pain medicine.  When asked if his foot pain is improved without the pain medicine he states that it is unchanged from surgery.    Objective     Vital Signs  Temp:  [98.4 °F (36.9 °C)-98.8 °F (37.1 °C)] 98.6 °F (37 °C)  Heart Rate:  [60-85] 60  Resp:  [16-18] 18  BP: (162-175)/(46-60) 170/46    Physical Exam  Constitutional:       Appearance: Normal appearance.   Cardiovascular:      Rate and Rhythm: Normal rate.      Comments: Poor pedal signal somewhat improved from surgery.  Cap refill improved.  Pulmonary:      Effort: Pulmonary effort is normal. No respiratory distress.   Skin:     Comments: Oozing from right common femoral artery access site.  Area soft without evidence of pseudoaneurysm.   Neurological:      Mental Status: He is alert.         Laboratory Data:   Results from last 7 days   Lab Units 05/23/25  0303 05/22/25  1624 05/21/25  1632   WBC 10*3/mm3 8.41 8.83 3.82   HEMOGLOBIN g/dL 7.7* 7.5* 7.3*   HEMATOCRIT % 24.7* 24.2* 22.2*   PLATELETS 10*3/mm3 100* 105* 83*       Results from last 7 days   Lab Units 05/23/25  0303 05/22/25  1624 05/21/25  1632   SODIUM mmol/L 139 140 137    POTASSIUM mmol/L 3.2* 3.3* 3.1*   CHLORIDE mmol/L 99 100 95*   CO2 mmol/L 25.0 25.0 30.0*   BUN mg/dL 35* 29* 14   CREATININE mg/dL 3.80* 3.16* 2.29*   CALCIUM mg/dL 9.2 9.0 8.5*   BILIRUBIN mg/dL 0.2  --  0.2   ALK PHOS U/L 116  --  110   ALT (SGPT) U/L 5  --  5   AST (SGOT) U/L 20  --  18   GLUCOSE mg/dL 105* 93 99     Results from last 7 days   Lab Units 05/21/25  2323 05/21/25  1911 05/21/25  1632   PROTIME Seconds  --   --  16.4*   INR   --   --  1.25*   APTT seconds 71.0* 31.3 32.7            Medication Review:     Assessment & Plan       Arterial occlusion    Essential hypertension    Peripheral arterial disease    Anemia due to chronic kidney disease    CLL (chronic lymphocytic leukemia)    Chronic diastolic (congestive) heart failure    ESRD (end stage renal disease) on dialysis    Acute pain of left lower extremity    Thrombocytopenia      75-year-old male with PAD who presented with left lower extremity rest pain postop day 1 from left lower extremity arteriogram with left external iliac artery shockwave and stenting along with attempted crossing of left popliteal artery occlusion.    -Pain stable from admission.  - Left foot perfusion does appear slightly improved.  - Pressure applied right groin wound and hemostasis obtained.  - No further vascular intervention planned at this time.  - Continue aspirin Plavix.  - No heavy lifting for 2 weeks.    Plan for disposition:    Blayne Zabala MD  Vascular Surgery  [phone]  05/23/25  16:49 CDT

## 2025-05-24 LAB
ANION GAP SERPL CALCULATED.3IONS-SCNC: 11 MMOL/L (ref 5–15)
BUN SERPL-MCNC: 27 MG/DL (ref 8–23)
BUN/CREAT SERPL: 9 (ref 7–25)
CALCIUM SPEC-SCNC: 8.2 MG/DL (ref 8.6–10.5)
CHLORIDE SERPL-SCNC: 98 MMOL/L (ref 98–107)
CO2 SERPL-SCNC: 30 MMOL/L (ref 22–29)
CREAT SERPL-MCNC: 2.99 MG/DL (ref 0.76–1.27)
DEPRECATED RDW RBC AUTO: 67.7 FL (ref 37–54)
EGFRCR SERPLBLD CKD-EPI 2021: 20.8 ML/MIN/1.73
ERYTHROCYTE [DISTWIDTH] IN BLOOD BY AUTOMATED COUNT: 18.1 % (ref 12.3–15.4)
GLUCOSE SERPL-MCNC: 106 MG/DL (ref 65–99)
HCT VFR BLD AUTO: 21 % (ref 37.5–51)
HGB BLD-MCNC: 6.7 G/DL (ref 13–17.7)
MAGNESIUM SERPL-MCNC: 1.8 MG/DL (ref 1.6–2.4)
MCH RBC QN AUTO: 32.8 PG (ref 26.6–33)
MCHC RBC AUTO-ENTMCNC: 31.9 G/DL (ref 31.5–35.7)
MCV RBC AUTO: 102.9 FL (ref 79–97)
PLATELET # BLD AUTO: 86 10*3/MM3 (ref 140–450)
PMV BLD AUTO: 10 FL (ref 6–12)
POTASSIUM SERPL-SCNC: 3 MMOL/L (ref 3.5–5.2)
RBC # BLD AUTO: 2.04 10*6/MM3 (ref 4.14–5.8)
SODIUM SERPL-SCNC: 139 MMOL/L (ref 136–145)
WBC NRBC COR # BLD AUTO: 6.12 10*3/MM3 (ref 3.4–10.8)

## 2025-05-24 PROCEDURE — 94799 UNLISTED PULMONARY SVC/PX: CPT

## 2025-05-24 PROCEDURE — 86901 BLOOD TYPING SEROLOGIC RH(D): CPT

## 2025-05-24 PROCEDURE — 25010000002 MORPHINE PER 10 MG: Performed by: STUDENT IN AN ORGANIZED HEALTH CARE EDUCATION/TRAINING PROGRAM

## 2025-05-24 PROCEDURE — 94664 DEMO&/EVAL PT USE INHALER: CPT

## 2025-05-24 PROCEDURE — 86900 BLOOD TYPING SEROLOGIC ABO: CPT

## 2025-05-24 PROCEDURE — 80048 BASIC METABOLIC PNL TOTAL CA: CPT | Performed by: INTERNAL MEDICINE

## 2025-05-24 PROCEDURE — 83735 ASSAY OF MAGNESIUM: CPT | Performed by: INTERNAL MEDICINE

## 2025-05-24 PROCEDURE — 85027 COMPLETE CBC AUTOMATED: CPT | Performed by: INTERNAL MEDICINE

## 2025-05-24 PROCEDURE — 86923 COMPATIBILITY TEST ELECTRIC: CPT

## 2025-05-24 PROCEDURE — P9016 RBC LEUKOCYTES REDUCED: HCPCS

## 2025-05-24 PROCEDURE — 94761 N-INVAS EAR/PLS OXIMETRY MLT: CPT

## 2025-05-24 PROCEDURE — 36430 TRANSFUSION BLD/BLD COMPNT: CPT

## 2025-05-24 RX ORDER — FUROSEMIDE 10 MG/ML
20 INJECTION INTRAMUSCULAR; INTRAVENOUS ONCE
Status: DISCONTINUED | OUTPATIENT
Start: 2025-05-24 | End: 2025-05-30

## 2025-05-24 RX ORDER — IRON POLYSACCHARIDE COMPLEX 150 MG
150 CAPSULE ORAL DAILY
Status: DISCONTINUED | OUTPATIENT
Start: 2025-05-24 | End: 2025-06-05 | Stop reason: HOSPADM

## 2025-05-24 RX ORDER — OXYCODONE AND ACETAMINOPHEN 5; 325 MG/1; MG/1
1 TABLET ORAL EVERY 4 HOURS PRN
Refills: 0 | Status: DISCONTINUED | OUTPATIENT
Start: 2025-05-24 | End: 2025-05-24

## 2025-05-24 RX ORDER — OXYCODONE AND ACETAMINOPHEN 5; 325 MG/1; MG/1
1 TABLET ORAL EVERY 4 HOURS PRN
Refills: 0 | Status: DISCONTINUED | OUTPATIENT
Start: 2025-05-24 | End: 2025-06-03

## 2025-05-24 RX ORDER — GABAPENTIN 300 MG/1
300 CAPSULE ORAL NIGHTLY
Status: DISCONTINUED | OUTPATIENT
Start: 2025-05-24 | End: 2025-06-05 | Stop reason: HOSPADM

## 2025-05-24 RX ORDER — POTASSIUM CHLORIDE 1500 MG/1
20 TABLET, EXTENDED RELEASE ORAL ONCE
Status: COMPLETED | OUTPATIENT
Start: 2025-05-24 | End: 2025-05-24

## 2025-05-24 RX ORDER — OXYCODONE AND ACETAMINOPHEN 5; 325 MG/1; MG/1
1 TABLET ORAL EVERY 6 HOURS PRN
Refills: 0 | Status: DISCONTINUED | OUTPATIENT
Start: 2025-05-24 | End: 2025-05-24

## 2025-05-24 RX ADMIN — ATORVASTATIN CALCIUM 10 MG: 10 TABLET, FILM COATED ORAL at 09:30

## 2025-05-24 RX ADMIN — Medication 150 MG: at 16:09

## 2025-05-24 RX ADMIN — IPRATROPIUM BROMIDE AND ALBUTEROL SULFATE 3 ML: .5; 3 SOLUTION RESPIRATORY (INHALATION) at 07:29

## 2025-05-24 RX ADMIN — Medication 10 ML: at 09:31

## 2025-05-24 RX ADMIN — CLOPIDOGREL BISULFATE 75 MG: 75 TABLET, FILM COATED ORAL at 09:30

## 2025-05-24 RX ADMIN — ISOSORBIDE DINITRATE 10 MG: 10 TABLET ORAL at 17:25

## 2025-05-24 RX ADMIN — OXYCODONE HYDROCHLORIDE AND ACETAMINOPHEN 1 TABLET: 5; 325 TABLET ORAL at 19:40

## 2025-05-24 RX ADMIN — CLONIDINE HYDROCHLORIDE 0.3 MG: 0.1 TABLET ORAL at 20:58

## 2025-05-24 RX ADMIN — Medication 10 ML: at 20:58

## 2025-05-24 RX ADMIN — MORPHINE SULFATE 1 MG: 2 INJECTION, SOLUTION INTRAMUSCULAR; INTRAVENOUS at 16:09

## 2025-05-24 RX ADMIN — OXYCODONE HYDROCHLORIDE AND ACETAMINOPHEN 1 TABLET: 5; 325 TABLET ORAL at 11:15

## 2025-05-24 RX ADMIN — OXYCODONE HYDROCHLORIDE AND ACETAMINOPHEN 1 TABLET: 5; 325 TABLET ORAL at 23:26

## 2025-05-24 RX ADMIN — PANTOPRAZOLE SODIUM 40 MG: 40 TABLET, DELAYED RELEASE ORAL at 05:24

## 2025-05-24 RX ADMIN — Medication 10 ML: at 09:32

## 2025-05-24 RX ADMIN — IPRATROPIUM BROMIDE AND ALBUTEROL SULFATE 3 ML: .5; 3 SOLUTION RESPIRATORY (INHALATION) at 11:28

## 2025-05-24 RX ADMIN — TAMSULOSIN HYDROCHLORIDE 0.4 MG: 0.4 CAPSULE ORAL at 20:57

## 2025-05-24 RX ADMIN — LEVOTHYROXINE SODIUM 100 MCG: 0.1 TABLET ORAL at 05:24

## 2025-05-24 RX ADMIN — GABAPENTIN 300 MG: 300 CAPSULE ORAL at 20:57

## 2025-05-24 RX ADMIN — ISOSORBIDE DINITRATE 10 MG: 10 TABLET ORAL at 09:30

## 2025-05-24 RX ADMIN — OXYCODONE HYDROCHLORIDE AND ACETAMINOPHEN 1 TABLET: 5; 325 TABLET ORAL at 05:27

## 2025-05-24 RX ADMIN — IPRATROPIUM BROMIDE AND ALBUTEROL SULFATE 3 ML: .5; 3 SOLUTION RESPIRATORY (INHALATION) at 15:48

## 2025-05-24 RX ADMIN — MONTELUKAST SODIUM 10 MG: 10 TABLET, COATED ORAL at 20:58

## 2025-05-24 RX ADMIN — OXYCODONE HYDROCHLORIDE AND ACETAMINOPHEN 1 TABLET: 5; 325 TABLET ORAL at 15:06

## 2025-05-24 RX ADMIN — CARVEDILOL 25 MG: 25 TABLET, FILM COATED ORAL at 17:27

## 2025-05-24 RX ADMIN — ASPIRIN 81 MG: 81 TABLET, COATED ORAL at 09:30

## 2025-05-24 RX ADMIN — POTASSIUM CHLORIDE 20 MEQ: 1500 TABLET, EXTENDED RELEASE ORAL at 16:09

## 2025-05-24 RX ADMIN — ISOSORBIDE DINITRATE 10 MG: 10 TABLET ORAL at 14:22

## 2025-05-24 RX ADMIN — CALCITRIOL CAPSULES 0.25 MCG 0.5 MCG: 0.25 CAPSULE ORAL at 09:30

## 2025-05-24 RX ADMIN — HYDRALAZINE HYDROCHLORIDE 100 MG: 50 TABLET ORAL at 20:58

## 2025-05-24 NOTE — PROGRESS NOTES
[facility] Medicine Services  INPATIENT PROGRESS NOTE    Patient Name: Ricardo Hugo  Date of Admission: 5/21/2025  Today's Date: 05/24/25  Length of Stay: 3  Primary Care Physician: Nathan Zimmer MD    Subjective   Chief Complaint: Peripheral vascular disease/pain/dialysis/anemia/thrombocytopenia  HPI   Blood pressure stable, slightly bradycardia, afebrile.  Patient wants more pain medication, increase Percocet every 4 hours, add Neurontin at night.  Hemoglobin is low status post 1 units of blood transfusion add Procrit, Niferex.    Review of Systems   Constitutional:  Positive for activity change, appetite change and fatigue. Negative for chills and fever.   HENT:  Negative for hearing loss, nosebleeds, tinnitus and trouble swallowing.    Eyes:  Negative for visual disturbance.   Respiratory:  Negative for cough, chest tightness, shortness of breath and wheezing.    Cardiovascular:  Negative for chest pain, palpitations and leg swelling.   Gastrointestinal:  Negative for abdominal distention, abdominal pain, blood in stool, constipation, diarrhea, nausea and vomiting.   Endocrine: Negative for cold intolerance, heat intolerance, polydipsia, polyphagia and polyuria.   Genitourinary:  Negative for decreased urine volume, difficulty urinating, dysuria, flank pain, frequency and hematuria.   Musculoskeletal:  Positive for arthralgias, gait problem and myalgias. Negative for joint swelling.   Skin:  Negative for rash.   Allergic/Immunologic: Negative for immunocompromised state.   Neurological:  Positive for weakness. Negative for dizziness, syncope, light-headedness and headaches.   Hematological:  Negative for adenopathy. Does not bruise/bleed easily.   Psychiatric/Behavioral:  Negative for confusion and sleep disturbance. The patient is not nervous/anxious.         All pertinent negatives and positives are as above. All other systems have been reviewed and are negative  unless otherwise stated.     Objective    Temp:  [98 °F (36.7 °C)-99.4 °F (37.4 °C)] 98.1 °F (36.7 °C)  Heart Rate:  [49-61] 55  Resp:  [16-18] 16  BP: (116-170)/(33-75) 139/44  Physical Exam  Vitals and nursing note reviewed.   Constitutional:       Comments: Advanced age.  Cachectic.  Chronically ill.   HENT:      Head: Normocephalic.   Eyes:      Conjunctiva/sclera: Conjunctivae normal.      Pupils: Pupils are equal, round, and reactive to light.   Cardiovascular:      Rate and Rhythm: Normal rate and regular rhythm.      Heart sounds: Normal heart sounds.   Pulmonary:      Effort: No respiratory distress.      Comments: Diminished breath sound bilateral, clear, on room air.  Abdominal:      General: Bowel sounds are normal. There is no distension.      Palpations: Abdomen is soft.      Tenderness: There is no abdominal tenderness.   Musculoskeletal:         General: No swelling.      Cervical back: Neck supple.   Skin:     General: Skin is warm and dry.      Findings: No rash.   Neurological:      Mental Status: He is alert and oriented to person, place, and time.      Motor: Weakness present.      Coordination: Coordination abnormal.      Gait: Gait abnormal.   Psychiatric:         Mood and Affect: Mood normal.         Behavior: Behavior normal.         Thought Content: Thought content normal.             Results Review:  I have reviewed the labs, radiology results, and diagnostic studies.    Laboratory Data:   Results from last 7 days   Lab Units 05/24/25  0214 05/23/25  0303 05/22/25  1624   WBC 10*3/mm3 6.12 8.41 8.83   HEMOGLOBIN g/dL 6.7* 7.7* 7.5*   HEMATOCRIT % 21.0* 24.7* 24.2*   PLATELETS 10*3/mm3 86* 100* 105*        Results from last 7 days   Lab Units 05/24/25  0214 05/23/25  0303 05/22/25  1624 05/21/25  1632   SODIUM mmol/L 139 139 140 137   POTASSIUM mmol/L 3.0* 3.2* 3.3* 3.1*   CHLORIDE mmol/L 98 99 100 95*   CO2 mmol/L 30.0* 25.0 25.0 30.0*   BUN mg/dL 27* 35* 29* 14   CREATININE mg/dL 2.99*  "3.80* 3.16* 2.29*   CALCIUM mg/dL 8.2* 9.2 9.0 8.5*   BILIRUBIN mg/dL  --  0.2  --  0.2   ALK PHOS U/L  --  116  --  110   ALT (SGPT) U/L  --  5  --  5   AST (SGOT) U/L  --  20  --  18   GLUCOSE mg/dL 106* 105* 93 99       Culture Data:   No results found for: \"BLOODCX\", \"URINECX\", \"WOUNDCX\", \"MRSACX\", \"RESPCX\", \"STOOLCX\"    Radiology Data:   Imaging Results (Last 24 Hours)       ** No results found for the last 24 hours. **            I have reviewed the patient's current medications.     Assessment/Plan   Assessment  Active Hospital Problems    Diagnosis     **Arterial occlusion     Thrombocytopenia     Acute pain of left lower extremity     ESRD (end stage renal disease) on dialysis     Abnormal TSH     Chronic diastolic (congestive) heart failure     Anticoagulated     Sensorineural hearing loss (SNHL), bilateral     Eustachian tube dysfunction, bilateral     Nasal septal deviation     Mixed hyperlipidemia     CLL (chronic lymphocytic leukemia)     Anemia due to chronic kidney disease     Diastolic dysfunction     Symptomatic anemia     Resistant hypertension     Peripheral arterial disease     IHD (ischemic heart disease)     Essential hypertension     Chronic rhinitis        Treatment Plan  Acute ischemic left lower extremity /left leg pain.  Vascular consult.  Status post surgery 5/22/2025-Right common femoral artery access under ultrasound guidance, left posterior tibial artery access ultrasound guidance, Left lower extremity arteriogram with third order selection angiographic interpretation, PTA of the left SFA with a 3 x 60 Tammy cross balloon, Shockwave lithotripsy of the left external iliac artery with a 6 x 60 shockwave balloon, Left external iliac artery stenting with a 8 x 60 ever flex stent postdilated with a 7 x 60 Ever Cross balloon    End-stage renal disease.  Dialysis patient.  Calcitriol . creatinine stable.    Hypokalemia.  Ongoing dialysis.  Potassium p.o . magnesium-normal.    Anemia. Status " post 1 unit blood transfusion.  Niferex . Procrit.    Thrombocytopenia.  Decrease in platelets.    CAD/hypertension/hyperlipidemia/CHF.  Aspirin . Lipitor.  Coreg . Catapres.  Plavix.  Hydralazine . Isordil . Procardia.  Aldactone.  Diovan . hydralazine as needed.  Nitro as needed.  Echocardiogram 1/11/2025- 56 - 60%,  mild septal asymmetric hypertrophy, diastolic function is consistent with (grade II w/high LAP) pseudonormalization, Normal right ventricular cavity size and systolic function, left atrial cavity is mild to moderately dilated, right atrial cavity is dilated, Mild aortic valve stenosis, Mild to moderate mitral valve regurgitation, Moderate to severe tricuspid valve regurgitation,  tricuspid regurgitation is markedly elevated (>55 mmHg).    COPD.  Not exacerbation.  DuoNebs.  Singulair.  Hypothyroidism.  Synthroid.  Incentive spirometer.  Patient is on room air.    Reflux . Protonix.  Compazine as needed.  Phenergan as needed.    Prostate hypertrophy.  Flomax.    Anxiety/depression.  Xanax as needed.    Pain.  Morphine as needed.  Percocet as needed.  Neurontin at Night.    Nutrition . Diabetic diet.    Deconditioning.  PT consult.    Medical Decision Making  Number and Complexity of problems: Ischemic left leg/end-stage renal disease/hypokalemia/anemia/thrombocytopenia/CAD/CHF/COPD  Differential Diagnosis: None    Conditions and Status        Condition is unchanged.     [facility] Data  External documents reviewed: Previous note .  Cardiac tracing (EKG, telemetry) interpretation: No monitor  Radiology interpretation: Echo  Labs reviewed: Laboratory  Any tests that were considered but not ordered: Lab in a.m.     Decision rules/scores evaluated (example [id number], Wells, etc): None     Discussed with: Patient and daughter     Care Planning  Shared decision making: Patient and daughter  Code status and discussions: Full code    Disposition  Social Determinants of Health that impact treatment or disposition:  From home  Patient probably need rehab placement.  Peripheral vascular disease/pain/2 to 5 days.        Electronically signed by Garrett Comer MD, 05/24/25, 15:25 CDT.

## 2025-05-24 NOTE — PROGRESS NOTES
LOS: 3 days   Patient Care Team:  Nathan Zimmer MD as PCP - General (Internal Medicine)  Adrien Brewster MD as Consulting Physician (Gastroenterology)  Eleuterio Farley MD (Inactive) as Cardiologist (Cardiology)  Elena Jon APRN as Referring Physician (Vascular Surgery)  Bolivar Rueda MD as Consulting Physician (Nephrology)  Slava Tate APRN as Nurse Practitioner (Family Medicine)  New Omalley MD as Consulting Physician (Pulmonary Disease)  Becky Camacho APRN as Nurse Practitioner (Hematology and Oncology)  Gil Pineda DO as Consulting Physician (Vascular Surgery)    Chief Complaint: Left foot pain    Subjective     Patient Complaints: Patient reports continued foot pain.     Objective     Vital Signs  Temp:  [98 °F (36.7 °C)-99.4 °F (37.4 °C)] 98 °F (36.7 °C)  Heart Rate:  [49-85] 49  Resp:  [16-18] 16  BP: (116-170)/(33-75) 116/35    Physical Exam  Constitutional:       Appearance: Normal appearance.   Cardiovascular:      Rate and Rhythm: Normal rate.      Comments: Poor pedal signal somewhat improved from surgery.  Cap refill improved.  Pulmonary:      Effort: Pulmonary effort is normal. No respiratory distress.   Skin:     Comments: Access site soft and dressing is CDI wo evidence of ongoing bleeding.    Neurological:      Mental Status: He is alert.         Laboratory Data:   Results from last 7 days   Lab Units 05/24/25  0214 05/23/25  0303 05/22/25  1624   WBC 10*3/mm3 6.12 8.41 8.83   HEMOGLOBIN g/dL 6.7* 7.7* 7.5*   HEMATOCRIT % 21.0* 24.7* 24.2*   PLATELETS 10*3/mm3 86* 100* 105*       Results from last 7 days   Lab Units 05/24/25  0214 05/23/25  0303 05/22/25  1624 05/21/25  1632   SODIUM mmol/L 139 139 140 137   POTASSIUM mmol/L 3.0* 3.2* 3.3* 3.1*   CHLORIDE mmol/L 98 99 100 95*   CO2 mmol/L 30.0* 25.0 25.0 30.0*   BUN mg/dL 27* 35* 29* 14   CREATININE mg/dL 2.99* 3.80* 3.16* 2.29*   CALCIUM mg/dL 8.2* 9.2 9.0 8.5*   BILIRUBIN mg/dL   --  0.2  --  0.2   ALK PHOS U/L  --  116  --  110   ALT (SGPT) U/L  --  5  --  5   AST (SGOT) U/L  --  20  --  18   GLUCOSE mg/dL 106* 105* 93 99     Results from last 7 days   Lab Units 05/21/25  2323 05/21/25  1911 05/21/25  1632   PROTIME Seconds  --   --  16.4*   INR   --   --  1.25*   APTT seconds 71.0* 31.3 32.7            Medication Review:     Assessment & Plan       Arterial occlusion    Essential hypertension    Peripheral arterial disease    Anemia due to chronic kidney disease    CLL (chronic lymphocytic leukemia)    Chronic diastolic (congestive) heart failure    ESRD (end stage renal disease) on dialysis    Acute pain of left lower extremity    Thrombocytopenia      75-year-old male with PAD who presented with left lower extremity rest pain s/p left lower extremity arteriogram with left external iliac artery shockwave and stenting along with attempted crossing of left popliteal artery occlusion on 5/23/25.    - Patient has continued left foot pain that is still requiring significant amounts of pain medicine.  - Hemoglobin 6 from 7 PRBCs given this a.m.  Continue to monitor.  - Had an extensive discussion with the patient this morning regarding the risk and benefits his limited revascularization options.  Also discussed with him the risk and benefits of an above-knee amputation.  - At this point the patient would like to proceed with left above-knee amputation.  - Will continue to follow, and if no significant changes over the weekend we will plan on left above-knee amputation next week.  - Will cont to follow.   - Continue aspirin Plavix.    Plan for disposition:    Blayne Zabala MD  Vascular Surgery  [phone]  05/24/25  09:08 CDT

## 2025-05-24 NOTE — PLAN OF CARE
Goal Outcome Evaluation:           Progress: no change  Outcome Evaluation: Patient c/o pain PRN meds given per orders, IID, dressing to LLE C/D/I with g/t, dressing to right groind dried serosang drainage with g/t, IID, tolerating diet, patient had hgb: 6.7 1 unit PRBCs infusing

## 2025-05-24 NOTE — PROGRESS NOTES
Nephrology ([facility]) Progress Note      Patient:  Ricardo Hugo  YOB: 1950  Date of Service: 5/24/2025  MRN: [id number]   Acct: [id number]   Primary Care Physician: Nathan Zimmer MD  Advance Directive:   Code Status and Medical Interventions: CPR (Attempt to Resuscitate); Full Support   Ordered at: 05/21/25 1822     Code Status (Patient has no pulse and is not breathing):    CPR (Attempt to Resuscitate)     Medical Interventions (Patient has pulse or is breathing):    Full Support     Admit Date: 5/21/2025       Hospital Day: 3  Referring Provider: No ref. provider found      Patient personally seen and examined.  Complete chart including Consults, Notes, Operative Reports, Labs, Cardiology, and Radiology studies reviewed as able.        Subjective:  Ricardo Hugo is a 75 y.o. male for whom we were consulted for evaluation and treatment of end-stage renal disease. He is a Monday Wednesday Friday dialysis patient. He presented for evaluation of left leg pain and was found to have an arterial blockage. He had undergone an angiogram last month and apparently had a retained balloon by chart review that required a second procedure to retrieve. He was taken to the OR by Dr. Zabala for intervention. He was seen immediately after the OR. He was seen with family. He is complaining of pain in the operative leg. Denied other complaint upon questioning. Denied any issues with dialysis. Denied current chest pain, shortness of air at rest, nausea or vomiting.     Today, no overnight events reported.  Patient seen on dialysis yesterday without issue.  Denied any complaints including chest pain, shortness of air at rest, nausea or vomiting.  Seen with family.        Allergies:  Ondansetron, Zofran [ondansetron hcl], Lortab [hydrocodone-acetaminophen], and Allopurinol    Home Meds:  Medications Prior to Admission   Medication Sig Dispense Refill Last Dose/Taking    ALPRAZolam  (XANAX) 0.25 MG tablet Take 1 tablet by mouth Every Night.   Taking    aspirin (aspirin) 81 MG EC tablet Take 1 tablet by mouth Daily.   Taking    atorvastatin (LIPITOR) 10 MG tablet Take 1 tablet by mouth Daily. 90 tablet 3 Taking    calcitriol (ROCALTROL) 0.5 MCG capsule Take 1 capsule by mouth Daily. 90 capsule 2 Taking    carvedilol (COREG) 25 MG tablet Take 1 tablet by mouth 2 (Two) Times a Day With Meals.   Taking    cholestyramine light 4 g packet Take 1 packet by mouth Daily. 90 packet 1 Taking    cloNIDine (CATAPRES) 0.3 MG tablet Take 1 tablet by mouth 3 times a day.   Taking    clopidogrel (PLAVIX) 75 MG tablet Take 1 tablet by mouth Daily.   Taking    Copper Gluconate (Copper Caps) 2 MG capsule Take 2 mg by mouth Daily.   Taking    empagliflozin (Jardiance) 10 MG tablet tablet Take 1 tablet by mouth Daily.   Taking    fexofenadine (ALLEGRA) 180 MG tablet Take 1 tablet by mouth Daily.   Taking    furosemide (LASIX) 40 MG tablet Take 1 tablet by mouth Daily. 90 tablet 2 Taking    hydrALAZINE (APRESOLINE) 100 MG tablet Take 1 tablet by mouth 3 (Three) Times a Day. 100mg daily   Taking    isosorbide dinitrate (ISORDIL) 10 MG tablet Take 1 tablet by mouth 3 (Three) Times a Day. 270 tablet 2 Taking    levothyroxine (Synthroid) 100 MCG tablet Take 1 tablet by mouth Every Morning. 90 tablet 2 Taking    magnesium chloride ER 64 MG DR tablet Take 1 tablet by mouth Daily.   Taking    Melatonin 10 MG tablet Take 1 tablet by mouth Every Night.   Taking    mesalamine (APRISO) 0.375 g 24 hr capsule Take 4 capsules by mouth Daily. 360 capsule 1 Taking    montelukast (SINGULAIR) 10 MG tablet Take 1 tablet by mouth Every Night.   Taking    Multiple Vitamins-Minerals (PRESERVISION/LUTEIN) capsule Take 1 capsule by mouth 2 (two) times a day.   Taking    NIFEdipine XL (PROCARDIA XL) 60 MG 24 hr tablet Take 1 tablet by mouth Daily.   Taking    omeprazole (priLOSEC) 20 MG capsule Take 1 capsule by mouth Daily.   Taking     sodium bicarbonate 650 MG tablet Take 1 tablet by mouth 5 (Five) Times a Day.   Taking    spironolactone (ALDACTONE) 25 MG tablet Take 1 tablet by mouth Daily.   Taking    tamsulosin (FLOMAX) 0.4 MG capsule 24 hr capsule Take 1 capsule by mouth Every Night.   Taking    valsartan (DIOVAN) 320 MG tablet Take 1 tablet by mouth Daily.   Taking    vitamin D (ERGOCALCIFEROL) 1.25 MG (85076 UT) capsule capsule Take 1 capsule by mouth 1 (One) Time Per Week. Mondays   Taking    albuterol sulfate  (90 Base) MCG/ACT inhaler Inhale 2 puffs Every 6 (Six) Hours As Needed for Wheezing or Shortness of Air.       aspirin-acetaminophen-caffeine (EXCEDRIN MIGRAINE) 250-250-65 MG per tablet Take 1 tablet by mouth Every 6 (Six) Hours As Needed for Headache. (Patient taking differently: Take 3 tablets by mouth Every 6 (Six) Hours As Needed for Headache. Patient reports he takes 6-12 every day.)       Budeson-Glycopyrrol-Formoterol (Breztri Aerosphere) 160-9-4.8 MCG/ACT aerosol inhaler Inhale 2 puffs 2 (Two) Times a Day.       desoximetasone (TOPICORT) 0.25 % cream Apply 1 Application topically to the appropriate area as directed 2 (Two) Times a Day As Needed for Irritation. irritation       diphenhydrAMINE HCl, Sleep, 50 MG/30ML liquid Take 15 mL by mouth At Night As Needed (insomnia).       docusate sodium (COLACE) 100 MG capsule Take 1 capsule by mouth 3 (Three) Times a Day As Needed for Constipation.       fluticasone (FLONASE) 50 MCG/ACT nasal spray Administer 2 sprays into the nostril(s) as directed by provider Daily As Needed for Allergies.          Medicines:  Current Facility-Administered Medications   Medication Dose Route Frequency Provider Last Rate Last Admin    ALPRAZolam (XANAX) tablet 0.25 mg  0.25 mg Oral Nightly PRN Blayne Zabala MD   0.25 mg at 05/22/25 0047    aspirin EC tablet 81 mg  81 mg Oral Daily Blayne Zabala MD   81 mg at 05/24/25 0930    atorvastatin (LIPITOR) tablet 10 mg  10 mg Oral Daily Vj  MD Blayne   10 mg at 05/24/25 0930    sennosides-docusate (PERICOLACE) 8.6-50 MG per tablet 2 tablet  2 tablet Oral BID PRN Blayne Zabala MD        And    polyethylene glycol (MIRALAX) packet 17 g  17 g Oral Daily PRN Blayne Zabala MD        And    bisacodyl (DULCOLAX) EC tablet 5 mg  5 mg Oral Daily PRN Blayne Zabala MD        And    bisacodyl (DULCOLAX) suppository 10 mg  10 mg Rectal Daily PRN Blayne Zabala MD        calcitriol (ROCALTROL) capsule 0.5 mcg  0.5 mcg Oral Daily Blayne Zabala MD   0.5 mcg at 05/24/25 0930    carvedilol (COREG) tablet 25 mg  25 mg Oral BID With Meals Blayne Zabala MD   25 mg at 05/23/25 1752    cloNIDine (CATAPRES) tablet 0.3 mg  0.3 mg Oral TID Blayne Zabala MD   0.3 mg at 05/23/25 1435    clopidogrel (PLAVIX) tablet 75 mg  75 mg Oral Daily Blayne Zabala MD   75 mg at 05/24/25 0930    furosemide (LASIX) injection 20 mg  20 mg Intravenous Once Scott Johnson MD        heparin (porcine) injection 3,600 Units  3,600 Units Intravenous Once Twan Quiroz MD        hydrALAZINE (APRESOLINE) injection 10 mg  10 mg Intravenous Q6H PRN Blayne Zabala MD   10 mg at 05/22/25 0654    hydrALAZINE (APRESOLINE) tablet 100 mg  100 mg Oral TID Blayne Zabala MD   100 mg at 05/23/25 1601    ipratropium-albuterol (DUO-NEB) nebulizer solution 3 mL  3 mL Nebulization 4x Daily - RT Blayne Zabala MD   3 mL at 05/24/25 1128    isosorbide dinitrate (ISORDIL) tablet 10 mg  10 mg Oral TID - Nitrates Blayne Zabala MD   10 mg at 05/24/25 0930    levothyroxine (SYNTHROID, LEVOTHROID) tablet 100 mcg  100 mcg Oral Q AM Blayne Zabala MD   100 mcg at 05/24/25 0524    montelukast (SINGULAIR) tablet 10 mg  10 mg Oral Nightly Blayne Zabala MD   10 mg at 05/23/25 2032    Morphine sulfate (PF) injection 1 mg  1 mg Intravenous Q4H PRN Blayne Zabala MD   1 mg at 05/23/25 2014    NIFEdipine XL (PROCARDIA XL) 24 hr tablet 60 mg  60 mg Oral Daily Blayne Zabala MD    60 mg at 05/23/25 1100    nitroglycerin (NITROSTAT) SL tablet 0.4 mg  0.4 mg Sublingual Q5 Min PRN Blayne Zabala MD        oxyCODONE-acetaminophen (PERCOCET) 5-325 MG per tablet 1 tablet  1 tablet Oral Q6H PRN Garrett Comer MD   1 tablet at 05/24/25 1115    pantoprazole (PROTONIX) EC tablet 40 mg  40 mg Oral Q AM Blayne Zabala MD   40 mg at 05/24/25 0524    prochlorperazine (COMPAZINE) injection 5 mg  5 mg Intravenous Q6H PRN Pancho Camacho MD        Or    prochlorperazine (COMPAZINE) tablet 5 mg  5 mg Oral Q6H PRN Pancho Camacho MD        Or    prochlorperazine (COMPAZINE) suppository 25 mg  25 mg Rectal Q12H PRN Pancho Camacho MD        promethazine (PHENERGAN) 12.5 mg in sodium chloride 0.9 % 50 mL  12.5 mg Intravenous Q6H PRN Pancho Camacho MD        sodium chloride 0.9 % flush 10 mL  10 mL Intravenous PRN Blayne Zabala MD        sodium chloride 0.9 % flush 10 mL  10 mL Intravenous Q12H Blayne Zabala MD   10 mL at 05/24/25 0932    sodium chloride 0.9 % flush 10 mL  10 mL Intravenous PRN Blayne Zabala MD        sodium chloride 0.9 % flush 10 mL  10 mL Intravenous Q12H Blayne Zabala MD   10 mL at 05/24/25 0931    sodium chloride 0.9 % flush 10 mL  10 mL Intravenous PRN Blayne Zabala MD        sodium chloride 0.9 % infusion 40 mL  40 mL Intravenous PRN Blayne Zabala MD        sodium chloride 0.9 % infusion 40 mL  40 mL Intravenous PRN Blayne Zabala MD        spironolactone (ALDACTONE) tablet 25 mg  25 mg Oral Daily Blayne Zabala MD   25 mg at 05/23/25 1101    tamsulosin (FLOMAX) 24 hr capsule 0.4 mg  0.4 mg Oral Nightly Blayne Zabala MD   0.4 mg at 05/23/25 2032    valsartan (DIOVAN) tablet 320 mg  320 mg Oral Daily Blayne Zabala MD   320 mg at 05/23/25 1100       Past Medical History:  Past Medical History:   Diagnosis Date    3-vessel CAD 08/11/2020    Allergic rhinitis     Anemia     Anxiety disorder 04/27/2020    Arthritis     Asymmetrical  sensorineural hearing loss 06/28/2017    Atherosclerosis of native artery of both lower extremities with intermittent claudication 07/18/2019    Avascular necrosis of femoral head, left 07/11/2020    right hip after surgery    Carotid stenosis     Chronic mucoid otitis media     Chronic rhinitis     COPD (chronic obstructive pulmonary disease)     Coronary artery disease     HEART BYPASS 2004    Crohn's disease of large intestine with other complication 07/30/2020    Chronic diarrhea Colonoscopy July 2020 revealed mild patchy scattered hemosiderin staining with inflammation more so in rectosigmoid area.  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    Iron deficiency anemia 07/14/2020    Mixed hearing loss of left ear     PAD (peripheral artery disease) 08/11/2020    Perianal abscess     Pernicious anemia 08/17/2020    took shots but never diagnosed with b12 deficiency    Personal history of alcoholism 08/11/2020    quit drinking in 2013    Prostatic hypertrophy 08/11/2020    Sensorineural hearing loss     Sepsis with acute renal failure 09/15/2020    Shortness of breath 05/27/2021    Tinnitus     Ventricular tachycardia, nonsustained 07/14/2020    Weight loss 07/11/2020       Past Surgical History:  Past Surgical History:   Procedure Laterality Date    AORTOGRAM Right 4/25/2025    Procedure: RIGHT LOWER EXTREMITY ANGIOGRAM, SHOCKWAVE LITHOTRIPSY, BALLOON ANGIOPLASTY, MYNX CLOSURE;  Surgeon: Gil Pineda DO;  Location: [facility] OR;  Service: Vascular;  Laterality: Right;    AORTOGRAM Left 5/22/2025    Procedure: LEFT LOWER EXTREMITY ANGIOGRAM,  SHOCKWAVE LITHOTRIPSY, BALLOON ANGIOPLASTY, STENT PLACEMENT, MYNX CLOSURE;  Surgeon: Blayne Zabala MD;  Location: [facility] HYBRID OR;  Service: Vascular;  Laterality: Left;    ARTERY SURGERY  2021    right side on neck    CAROTID ENDARTERECTOMY Right 05/10/2021    Procedure: RIGHT CAROTID ENDARTERECTOMY WITH EEG;  Surgeon: Gil Pineda DO;  Location: [facility] HYBRID OR 12;  Service: Vascular;  Laterality: Right;    COLONOSCOPY N/A 07/02/2020    Procedure: COLONOSCOPY WITH ANESTHESIA;  Surgeon: Adrien Brewster MD;  Location: [facility] ENDOSCOPY;  Service: Gastroenterology;  Laterality: N/A;  pre op: diarrhea  post op: polyps  PCP: Joe Velasco MD    COLONOSCOPY N/A 10/13/2020    Procedure: COLONOSCOPY WITH ANESTHESIA;  Surgeon: Adrien Brewster MD;  Location: [facility] ENDOSCOPY;  Service: Gastroenterology;  Laterality: N/A;  Pre: Chronic Diarrhea, Crohn's  Post: AVM  Dr. Neftali Velasco  CO2 Inflation Used    COLONOSCOPY N/A 12/08/2023    Procedure: COLONOSCOPY WITH ANESTHESIA;  Surgeon: Adrien Brewster MD;  Location: [facility] ENDOSCOPY;  Service: Gastroenterology;  Laterality: N/A;  pre chrone's disease  post sub optimal prep, polyp, chrone's      CORONARY ARTERY BYPASS GRAFT  2003    x3    ENDOSCOPY N/A 11/02/2021    Procedure: ESOPHAGOGASTRODUODENOSCOPY WITH ANESTHESIA;  Surgeon: Bridger Bell MD;  Location: [facility] ENDOSCOPY;  Service: Gastroenterology;  Laterality: N/A;  pre anemia;gi bleed  post  gi bleed;schatski ring  Dr. ERIC Velasco    ENDOSCOPY N/A 10/10/2023    Procedure: ESOPHAGOGASTRODUODENOSCOPY WITH ANESTHESIA;  Surgeon: Adrien Brewster MD;  Location: [facility] ENDOSCOPY;  Service: Gastroenterology;  Laterality: N/A;  preop; anemia  postop esophagitis ; R/O barretts   PCP Randall Beata    EYE SURGERY Bilateral     catorac    INCISION AND DRAINAGE PERIRECTAL ABSCESS N/A 03/03/2017    Procedure: INCISION AND DRAINAGE OF JEET ANAL ABSCESS;  Surgeon: Lynette Smith MD;  Location:   [facility] OR;  Service:     INGUINAL HERNIA REPAIR Bilateral 2023    Procedure: INGUINAL HERNIA BILATERAL REPAIR LAPAROSCOPIC WITH DAVINCI ROBOT WITH MESH;  Surgeon: Tahira Rivera MD;  Location:  [facility] OR;  Service: Robotics - DaVinci;  Laterality: Bilateral;    INSERTION HEMODIALYSIS CATHETER N/A 2025    Procedure: HEMODIALYSIS CATHETER PLACEMENT;  Surgeon: Gil Pineda DO;  Location:  [facility] HYBRID OR;  Service: Vascular;  Laterality: N/A;    MYRINGOTOMY W/ TUBES Left 2017    06/10/2016    TONSILLECTOMY      TOTAL HIP ARTHROPLASTY Right        Family History  Family History   Problem Relation Age of Onset    Breast cancer Mother     Dementia Father     Glaucoma Father     No Known Problems Daughter     Colon polyps Neg Hx     Colon cancer Neg Hx        Social History  Social History     Socioeconomic History    Marital status:    Tobacco Use    Smoking status: Former     Current packs/day: 0.00     Average packs/day: 0.5 packs/day for 25.8 years (12.9 ttl pk-yrs)     Types: Cigarettes     Start date:      Quit date: 10/13/2013     Years since quittin.6     Passive exposure: Past    Smokeless tobacco: Never    Tobacco comments:     quit 2013   Vaping Use    Vaping status: Former    Substances: Nicotine    Devices: Pre-filled or refillable cartridge   Substance and Sexual Activity    Alcohol use: Not Currently    Drug use: No    Sexual activity: Not Currently     Partners: Female       Review of Systems:  History obtained from chart review and the patient  General ROS: No fever or chills  Respiratory ROS: No cough, shortness of breath, wheezing  Cardiovascular ROS: No chest pain or palpitations  Gastrointestinal ROS: No abdominal pain or melena  Genito-Urinary ROS: No dysuria or hematuria  Psych ROS: No anxiety and depression  14 point ROS reviewed with the patient and negative except as noted above and in the HPI unless unable to obtain.    Objective:  Patient Vitals for  the past 24 hrs:   BP Temp Temp src Pulse Resp SpO2 Weight   05/24/25 1136 -- -- -- 55 16 100 % --   05/24/25 1128 -- -- -- 57 16 95 % --   05/24/25 1009 139/44 98.1 °F (36.7 °C) Oral 56 16 96 % --   05/24/25 0740 (!) 116/35 98 °F (36.7 °C) Oral (!) 49 16 96 % --   05/24/25 0734 -- -- -- 50 16 99 % --   05/24/25 0729 -- -- -- 61 18 94 % --   05/24/25 0434 156/43 98.3 °F (36.8 °C) Oral 54 18 100 % --   05/24/25 0351 142/75 98.2 °F (36.8 °C) Oral 53 18 100 % --   05/24/25 0315 (!) 137/36 98.2 °F (36.8 °C) Oral 52 16 100 % 63.1 kg (139 lb 1.8 oz)   05/23/25 2316 159/44 98.8 °F (37.1 °C) Oral 58 16 100 % --   05/23/25 2037 (!) 132/35 -- -- -- -- -- --   05/23/25 1933 (!) 130/33 99.4 °F (37.4 °C) Oral 57 18 95 % --   05/23/25 1544 170/46 98.6 °F (37 °C) Oral 60 18 97 % --   05/23/25 1435 -- -- -- 77 18 94 % --   05/23/25 1428 -- -- -- 85 18 93 % --       Intake/Output Summary (Last 24 hours) at 5/24/2025 1319  Last data filed at 5/24/2025 1002  Gross per 24 hour   Intake 660 ml   Output --   Net 660 ml     General: awake/alert   Chest:  clear to auscultation bilaterally without respiratory distress  CVS: regular rate and rhythm  Abdominal: soft, nontender, positive bowel sounds  Extremities: no cyanosis or edema  Skin: warm and dry without rash      Labs:  Results from last 7 days   Lab Units 05/24/25  0214 05/23/25  0303 05/22/25  1624   WBC 10*3/mm3 6.12 8.41 8.83   HEMOGLOBIN g/dL 6.7* 7.7* 7.5*   HEMATOCRIT % 21.0* 24.7* 24.2*   PLATELETS 10*3/mm3 86* 100* 105*         Results from last 7 days   Lab Units 05/24/25  0214 05/23/25  0303 05/22/25  1624 05/21/25  1632   SODIUM mmol/L 139 139 140 137   POTASSIUM mmol/L 3.0* 3.2* 3.3* 3.1*   CHLORIDE mmol/L 98 99 100 95*   CO2 mmol/L 30.0* 25.0 25.0 30.0*   BUN mg/dL 27* 35* 29* 14   CREATININE mg/dL 2.99* 3.80* 3.16* 2.29*   CALCIUM mg/dL 8.2* 9.2 9.0 8.5*   EGFR mL/min/1.73 20.8* 15.6* 19.5* 28.7*   BILIRUBIN mg/dL  --  0.2  --  0.2   ALK PHOS U/L  --  116  --  110   ALT  (SGPT) U/L  --  5  --  5   AST (SGOT) U/L  --  20  --  18   GLUCOSE mg/dL 106* 105* 93 99       Radiology:   Imaging Results (Last 72 Hours)       Procedure Component Value Units Date/Time    IR Angiogram Extremity [[id number]] Collected: 05/23/25 0643     Updated: 05/23/25 1220    FL C Arm During Surgery [[id number]] Resulted: 05/22/25 1146     Updated: 05/22/25 1147    Narrative:      This procedure was auto-finalized with no dictation required.    US Venous Doppler Lower Extremity Left (duplex) [[id number]] Collected: 05/21/25 1817     Updated: 05/21/25 1820    Narrative:      History: Swelling       Impression:      Impression: There is no evidence of deep venous thrombosis or  superficial thrombophlebitis of the left lower extremity.     Comments: Left lower extremity venous duplex exam was performed using  color Doppler flow, Doppler wave form analysis, and grayscale imaging,  with and without compression. There is no evidence of deep venous  thrombosis of the common femoral, superficial femoral, popliteal,  posterior tibial, and peroneal veins. There is no thrombus identified in  the saphenofemoral junction or the greater saphenous vein.         This report was signed and finalized on 5/21/2025 6:17 PM by Blayne Zabala.       US Arterial Doppler Lower Extremity Left [[id number]] Collected: 05/21/25 1720     Updated: 05/21/25 1725    Narrative:      History: [facility]     Comments: Grayscale imaging as well as color flow duplex were used to  evaluate left lower extremity arterial system.     On the left, the peak systolic velocity in the common femoral artery  measured 149.3 cm/s. In the profunda femoris artery measured 124.6 cm/s.  In the proximal SFA measured 60.8 cm/s. In the mid SFA measured 0 cm/s.  In the distal SFA measured 82.3 cm/s. In the popliteal artery measured  11.9 cm/s. In the posterior tibial artery measured 18.5 cm/s. In the  anterior tibial artery measured 36.5 cm/s.       Impression:      There is  "an occlusion of the left mid SFA with distal  reconstitution in the distal SFA along with diminished waveforms beyond  the level of occlusion. No flow visualized in the left DP. There is  monophasic waveforms in the left common femoral artery which may  indicate more proximal disease.     This report was signed and finalized on 5/21/2025 5:22 PM by Blayne Zabala.               Culture:  No results found for: \"BLOODCX\", \"URINECX\", \"WOUNDCX\", \"MRSACX\", \"RESPCX\", \"STOOLCX\"      Assessment   End-stage renal disease  Hypertension  Anemia and chronic kidney disease  Peripheral vascular disease  Thrombocytopenia  Hypokalemia     Plan:  Discussed with patient, nursing, family  Workup reviewed today  Monitor labs  Dialysis Monday Wednesday Friday per routine scheduling  Vascular evaluation reviewed as current, noted plans for [facility] sometime later next week  Adjust blood pressure medications as needed postdialysis      Twan Quiroz MD  5/24/2025  13:19 CDT      "

## 2025-05-25 LAB
ALBUMIN SERPL-MCNC: 3.7 G/DL (ref 3.5–5.2)
ALBUMIN/GLOB SERPL: 1.4 G/DL
ALP SERPL-CCNC: 102 U/L (ref 39–117)
ALT SERPL W P-5'-P-CCNC: <5 U/L (ref 1–41)
ANION GAP SERPL CALCULATED.3IONS-SCNC: 17 MMOL/L (ref 5–15)
AST SERPL-CCNC: 20 U/L (ref 1–40)
BASOPHILS # BLD AUTO: 0.07 10*3/MM3 (ref 0–0.2)
BASOPHILS NFR BLD AUTO: 0.8 % (ref 0–1.5)
BH BB BLOOD EXPIRATION DATE: NORMAL
BH BB BLOOD TYPE BARCODE: 9500
BH BB DISPENSE STATUS: NORMAL
BH BB PRODUCT CODE: NORMAL
BH BB UNIT NUMBER: NORMAL
BILIRUB SERPL-MCNC: 0.4 MG/DL (ref 0–1.2)
BUN SERPL-MCNC: 43 MG/DL (ref 8–23)
BUN/CREAT SERPL: 11.5 (ref 7–25)
CALCIUM SPEC-SCNC: 9.3 MG/DL (ref 8.6–10.5)
CHLORIDE SERPL-SCNC: 96 MMOL/L (ref 98–107)
CHOLEST SERPL-MCNC: 123 MG/DL (ref 0–200)
CO2 SERPL-SCNC: 26 MMOL/L (ref 22–29)
CREAT SERPL-MCNC: 3.75 MG/DL (ref 0.76–1.27)
CROSSMATCH INTERPRETATION: NORMAL
DEPRECATED RDW RBC AUTO: 66.5 FL (ref 37–54)
EGFRCR SERPLBLD CKD-EPI 2021: 15.9 ML/MIN/1.73
EOSINOPHIL # BLD AUTO: 0.06 10*3/MM3 (ref 0–0.4)
EOSINOPHIL NFR BLD AUTO: 0.7 % (ref 0.3–6.2)
ERYTHROCYTE [DISTWIDTH] IN BLOOD BY AUTOMATED COUNT: 18.3 % (ref 12.3–15.4)
GLOBULIN UR ELPH-MCNC: 2.6 GM/DL
GLUCOSE SERPL-MCNC: 138 MG/DL (ref 65–99)
HBA1C MFR BLD: 4.8 % (ref 4.8–5.6)
HCT VFR BLD AUTO: 26.3 % (ref 37.5–51)
HDLC SERPL-MCNC: 29 MG/DL (ref 40–60)
HGB BLD-MCNC: 8.5 G/DL (ref 13–17.7)
IMM GRANULOCYTES # BLD AUTO: 0.08 10*3/MM3 (ref 0–0.05)
IMM GRANULOCYTES NFR BLD AUTO: 0.9 % (ref 0–0.5)
LDLC SERPL CALC-MCNC: 66 MG/DL (ref 0–100)
LDLC/HDLC SERPL: 2.11 {RATIO}
LYMPHOCYTES # BLD AUTO: 1.61 10*3/MM3 (ref 0.7–3.1)
LYMPHOCYTES NFR BLD AUTO: 18 % (ref 19.6–45.3)
MCH RBC QN AUTO: 31.8 PG (ref 26.6–33)
MCHC RBC AUTO-ENTMCNC: 32.3 G/DL (ref 31.5–35.7)
MCV RBC AUTO: 98.5 FL (ref 79–97)
MONOCYTES # BLD AUTO: 1.05 10*3/MM3 (ref 0.1–0.9)
MONOCYTES NFR BLD AUTO: 11.8 % (ref 5–12)
NEUTROPHILS NFR BLD AUTO: 6.05 10*3/MM3 (ref 1.7–7)
NEUTROPHILS NFR BLD AUTO: 67.8 % (ref 42.7–76)
NRBC BLD AUTO-RTO: 0 /100 WBC (ref 0–0.2)
PLATELET # BLD AUTO: 113 10*3/MM3 (ref 140–450)
PMV BLD AUTO: 9.5 FL (ref 6–12)
POTASSIUM SERPL-SCNC: 3.3 MMOL/L (ref 3.5–5.2)
PROT SERPL-MCNC: 6.3 G/DL (ref 6–8.5)
RBC # BLD AUTO: 2.67 10*6/MM3 (ref 4.14–5.8)
SODIUM SERPL-SCNC: 139 MMOL/L (ref 136–145)
T4 FREE SERPL-MCNC: 1.21 NG/DL (ref 0.92–1.68)
TRIGL SERPL-MCNC: 164 MG/DL (ref 0–150)
TSH SERPL DL<=0.05 MIU/L-ACNC: 13.52 UIU/ML (ref 0.27–4.2)
UNIT  ABO: NORMAL
UNIT  RH: NORMAL
VLDLC SERPL-MCNC: 28 MG/DL (ref 5–40)
WBC NRBC COR # BLD AUTO: 8.92 10*3/MM3 (ref 3.4–10.8)

## 2025-05-25 PROCEDURE — 94664 DEMO&/EVAL PT USE INHALER: CPT

## 2025-05-25 PROCEDURE — 25010000002 MORPHINE PER 10 MG: Performed by: STUDENT IN AN ORGANIZED HEALTH CARE EDUCATION/TRAINING PROGRAM

## 2025-05-25 PROCEDURE — 85025 COMPLETE CBC W/AUTO DIFF WBC: CPT | Performed by: FAMILY MEDICINE

## 2025-05-25 PROCEDURE — 80061 LIPID PANEL: CPT | Performed by: FAMILY MEDICINE

## 2025-05-25 PROCEDURE — 83036 HEMOGLOBIN GLYCOSYLATED A1C: CPT | Performed by: FAMILY MEDICINE

## 2025-05-25 PROCEDURE — 25010000002 MORPHINE PER 10 MG: Performed by: FAMILY MEDICINE

## 2025-05-25 PROCEDURE — 94799 UNLISTED PULMONARY SVC/PX: CPT

## 2025-05-25 PROCEDURE — 94761 N-INVAS EAR/PLS OXIMETRY MLT: CPT

## 2025-05-25 PROCEDURE — 80053 COMPREHEN METABOLIC PANEL: CPT | Performed by: FAMILY MEDICINE

## 2025-05-25 PROCEDURE — 84443 ASSAY THYROID STIM HORMONE: CPT | Performed by: FAMILY MEDICINE

## 2025-05-25 PROCEDURE — 84439 ASSAY OF FREE THYROXINE: CPT | Performed by: FAMILY MEDICINE

## 2025-05-25 RX ORDER — POTASSIUM CHLORIDE 1500 MG/1
20 TABLET, EXTENDED RELEASE ORAL ONCE
Status: COMPLETED | OUTPATIENT
Start: 2025-05-25 | End: 2025-05-25

## 2025-05-25 RX ADMIN — Medication 10 ML: at 08:11

## 2025-05-25 RX ADMIN — VALSARTAN 320 MG: 80 TABLET, FILM COATED ORAL at 08:10

## 2025-05-25 RX ADMIN — HYDRALAZINE HYDROCHLORIDE 100 MG: 50 TABLET ORAL at 08:10

## 2025-05-25 RX ADMIN — ALPRAZOLAM 0.25 MG: 0.25 TABLET ORAL at 23:58

## 2025-05-25 RX ADMIN — ATORVASTATIN CALCIUM 10 MG: 10 TABLET, FILM COATED ORAL at 08:10

## 2025-05-25 RX ADMIN — POTASSIUM CHLORIDE 20 MEQ: 1500 TABLET, EXTENDED RELEASE ORAL at 12:02

## 2025-05-25 RX ADMIN — Medication 10 ML: at 20:03

## 2025-05-25 RX ADMIN — CLONIDINE HYDROCHLORIDE 0.3 MG: 0.1 TABLET ORAL at 08:10

## 2025-05-25 RX ADMIN — OXYCODONE HYDROCHLORIDE AND ACETAMINOPHEN 1 TABLET: 5; 325 TABLET ORAL at 09:52

## 2025-05-25 RX ADMIN — Medication 10 ML: at 08:09

## 2025-05-25 RX ADMIN — LEVOTHYROXINE SODIUM 100 MCG: 0.1 TABLET ORAL at 05:29

## 2025-05-25 RX ADMIN — OXYCODONE HYDROCHLORIDE AND ACETAMINOPHEN 1 TABLET: 5; 325 TABLET ORAL at 06:04

## 2025-05-25 RX ADMIN — ISOSORBIDE DINITRATE 10 MG: 10 TABLET ORAL at 08:10

## 2025-05-25 RX ADMIN — SENNOSIDES, DOCUSATE SODIUM 2 TABLET: 50; 8.6 TABLET, FILM COATED ORAL at 08:10

## 2025-05-25 RX ADMIN — IPRATROPIUM BROMIDE AND ALBUTEROL SULFATE 3 ML: .5; 3 SOLUTION RESPIRATORY (INHALATION) at 18:02

## 2025-05-25 RX ADMIN — MORPHINE SULFATE 1 MG: 2 INJECTION, SOLUTION INTRAMUSCULAR; INTRAVENOUS at 16:14

## 2025-05-25 RX ADMIN — OXYCODONE HYDROCHLORIDE AND ACETAMINOPHEN 1 TABLET: 5; 325 TABLET ORAL at 23:54

## 2025-05-25 RX ADMIN — NIFEDIPINE 60 MG: 60 TABLET, FILM COATED, EXTENDED RELEASE ORAL at 08:10

## 2025-05-25 RX ADMIN — CLOPIDOGREL BISULFATE 75 MG: 75 TABLET, FILM COATED ORAL at 08:10

## 2025-05-25 RX ADMIN — MORPHINE SULFATE 1 MG: 2 INJECTION, SOLUTION INTRAMUSCULAR; INTRAVENOUS at 08:07

## 2025-05-25 RX ADMIN — GABAPENTIN 300 MG: 300 CAPSULE ORAL at 20:01

## 2025-05-25 RX ADMIN — MORPHINE SULFATE 1 MG: 2 INJECTION, SOLUTION INTRAMUSCULAR; INTRAVENOUS at 01:00

## 2025-05-25 RX ADMIN — OXYCODONE HYDROCHLORIDE AND ACETAMINOPHEN 1 TABLET: 5; 325 TABLET ORAL at 20:02

## 2025-05-25 RX ADMIN — PANTOPRAZOLE SODIUM 40 MG: 40 TABLET, DELAYED RELEASE ORAL at 05:29

## 2025-05-25 RX ADMIN — IPRATROPIUM BROMIDE AND ALBUTEROL SULFATE 3 ML: .5; 3 SOLUTION RESPIRATORY (INHALATION) at 07:32

## 2025-05-25 RX ADMIN — MONTELUKAST SODIUM 10 MG: 10 TABLET, COATED ORAL at 20:01

## 2025-05-25 RX ADMIN — SPIRONOLACTONE 25 MG: 25 TABLET ORAL at 08:10

## 2025-05-25 RX ADMIN — CALCITRIOL CAPSULES 0.25 MCG 0.5 MCG: 0.25 CAPSULE ORAL at 08:10

## 2025-05-25 RX ADMIN — CARVEDILOL 25 MG: 25 TABLET, FILM COATED ORAL at 08:10

## 2025-05-25 RX ADMIN — IPRATROPIUM BROMIDE AND ALBUTEROL SULFATE 3 ML: .5; 3 SOLUTION RESPIRATORY (INHALATION) at 11:07

## 2025-05-25 RX ADMIN — MORPHINE SULFATE 1 MG: 2 INJECTION, SOLUTION INTRAMUSCULAR; INTRAVENOUS at 22:10

## 2025-05-25 RX ADMIN — Medication 150 MG: at 08:10

## 2025-05-25 RX ADMIN — ASPIRIN 81 MG: 81 TABLET, COATED ORAL at 08:10

## 2025-05-25 RX ADMIN — MORPHINE SULFATE 1 MG: 2 INJECTION, SOLUTION INTRAMUSCULAR; INTRAVENOUS at 12:03

## 2025-05-25 RX ADMIN — ISOSORBIDE DINITRATE 10 MG: 10 TABLET ORAL at 18:27

## 2025-05-25 RX ADMIN — TAMSULOSIN HYDROCHLORIDE 0.4 MG: 0.4 CAPSULE ORAL at 20:01

## 2025-05-25 NOTE — PLAN OF CARE
Goal Outcome Evaluation:  Plan of Care Reviewed With: patient        Progress: no change  Outcome Evaluation: Pt c/o pain frequently, medicated with PRN PO and IV meds; BP was running low most of shift; voiding small amounts; IID; safety maintained.

## 2025-05-25 NOTE — PLAN OF CARE
Goal Outcome Evaluation:      PT eval attempted. Pt in bed and feels he does not want to move OOB. Pt voiced he is scheduled for an amputation next week and would participate once that is complete. PT to sign off and will await new orders.

## 2025-05-25 NOTE — PROGRESS NOTES
[facility] Medicine Services  INPATIENT PROGRESS NOTE    Patient Name: Ricardo Hugo  Date of Admission: 5/21/2025  Today's Date: 05/25/25  Length of Stay: 4  Primary Care Physician: Nathan Zimmer MD    Subjective   Chief Complaint: Peripheral vascular disease/pain/dialysis/anemia/thrombocytopenia   HPI   Blood pressure is high, increase nifedipine . Afebrile.  Elevated TSH, free T4 pending.  Potassium is improving.  Creatinine increased.  Hemoglobin improved.  Platelet counts increased.  Patient is on room air.    Review of Systems   Constitutional:  Positive for activity change, appetite change and fatigue. Negative for chills and fever.   HENT:  Negative for hearing loss, nosebleeds, tinnitus and trouble swallowing.    Eyes:  Negative for visual disturbance.   Respiratory:  Negative for cough, chest tightness, shortness of breath and wheezing.    Cardiovascular:  Negative for chest pain, palpitations and leg swelling.   Gastrointestinal:  Negative for abdominal distention, abdominal pain, blood in stool, constipation, diarrhea, nausea and vomiting.   Endocrine: Negative for cold intolerance, heat intolerance, polydipsia, polyphagia and polyuria.   Genitourinary:  Negative for decreased urine volume, difficulty urinating, dysuria, flank pain, frequency and hematuria.   Musculoskeletal:  Positive for arthralgias, gait problem and myalgias. Negative for joint swelling.   Skin:  Negative for rash.   Allergic/Immunologic: Negative for immunocompromised state.   Neurological:  Positive for weakness. Negative for dizziness, syncope, light-headedness and headaches.   Hematological:  Negative for adenopathy. Does not bruise/bleed easily.   Psychiatric/Behavioral:  Negative for confusion and sleep disturbance. The patient is not nervous/anxious.       All pertinent negatives and positives are as above. All other systems have been reviewed and are negative unless otherwise  stated.     Objective    Temp:  [98.1 °F (36.7 °C)-99.6 °F (37.6 °C)] 99.4 °F (37.4 °C)  Heart Rate:  [55-99] 83  Resp:  [16] 16  BP: (139-201)/(44-96) 166/54  Physical Exam  Vitals and nursing note reviewed.   Constitutional:       Comments: Advanced age.  Cachectic.  Chronically ill.   HENT:      Head: Normocephalic.   Eyes:      Conjunctiva/sclera: Conjunctivae normal.      Pupils: Pupils are equal, round, and reactive to light.   Cardiovascular:      Rate and Rhythm: Normal rate and regular rhythm.      Heart sounds: Normal heart sounds.   Pulmonary:      Effort: No respiratory distress.      Comments: Diminished breath sound bilateral, clear, on room air.  Abdominal:      General: Bowel sounds are normal. There is no distension.      Palpations: Abdomen is soft.      Tenderness: There is no abdominal tenderness.   Musculoskeletal:         General: No swelling.      Cervical back: Neck supple.   Skin:     General: Skin is warm and dry.      Findings: No rash.   Neurological:      Mental Status: He is alert and oriented to person, place, and time.      Motor: Weakness present.      Coordination: Coordination abnormal.      Gait: Gait abnormal.   Psychiatric:         Mood and Affect: Mood normal.         Behavior: Behavior normal.         Thought Content: Thought content normal.       Results Review:  I have reviewed the labs, radiology results, and diagnostic studies.    Laboratory Data:   Results from last 7 days   Lab Units 05/25/25  0457 05/24/25  0214 05/23/25  0303   WBC 10*3/mm3 8.92 6.12 8.41   HEMOGLOBIN g/dL 8.5* 6.7* 7.7*   HEMATOCRIT % 26.3* 21.0* 24.7*   PLATELETS 10*3/mm3 113* 86* 100*        Results from last 7 days   Lab Units 05/25/25  0457 05/24/25  0214 05/23/25  0303 05/22/25  1624 05/21/25  1632   SODIUM mmol/L 139 139 139   < > 137   POTASSIUM mmol/L 3.3* 3.0* 3.2*   < > 3.1*   CHLORIDE mmol/L 96* 98 99   < > 95*   CO2 mmol/L 26.0 30.0* 25.0   < > 30.0*   BUN mg/dL 43* 27* 35*   < > 14  "  CREATININE mg/dL 3.75* 2.99* 3.80*   < > 2.29*   CALCIUM mg/dL 9.3 8.2* 9.2   < > 8.5*   BILIRUBIN mg/dL 0.4  --  0.2  --  0.2   ALK PHOS U/L 102  --  116  --  110   ALT (SGPT) U/L <5  --  5  --  5   AST (SGOT) U/L 20  --  20  --  18   GLUCOSE mg/dL 138* 106* 105*   < > 99    < > = values in this interval not displayed.       Culture Data:   No results found for: \"BLOODCX\", \"URINECX\", \"WOUNDCX\", \"MRSACX\", \"RESPCX\", \"STOOLCX\"    Radiology Data:   Imaging Results (Last 24 Hours)       ** No results found for the last 24 hours. **            I have reviewed the patient's current medications.     Assessment/Plan   Assessment  Active Hospital Problems    Diagnosis     **Arterial occlusion     Thrombocytopenia     Acute pain of left lower extremity     ESRD (end stage renal disease) on dialysis     Abnormal TSH     Chronic diastolic (congestive) heart failure     Anticoagulated     Sensorineural hearing loss (SNHL), bilateral     Eustachian tube dysfunction, bilateral     Nasal septal deviation     Mixed hyperlipidemia     CLL (chronic lymphocytic leukemia)     Anemia due to chronic kidney disease     Diastolic dysfunction     Symptomatic anemia     Resistant hypertension     Peripheral arterial disease     IHD (ischemic heart disease)     Essential hypertension     Chronic rhinitis        Treatment Plan  Acute ischemic left lower extremity /left leg pain.  Vascular consult.  Status post surgery 5/22/2025-Right common femoral artery access under ultrasound guidance, left posterior tibial artery access ultrasound guidance, Left lower extremity arteriogram with third order selection angiographic interpretation, PTA of the left SFA with a 3 x 60 Tammy cross balloon, Shockwave lithotripsy of the left external iliac artery with a 6 x 60 shockwave balloon, Left external iliac artery stenting with a 8 x 60 ever flex stent postdilated with a 7 x 60 Ever Cross balloon  Vascular-plan for left above-the-knee amputation next week.   "   End-stage renal disease.  Dialysis patient.  Calcitriol . Creatinine increasing..     Hypokalemia.  Potassium improving. Magnesium-normal.     Anemia. Status post 1 unit blood transfusion 5/24/25.  Niferex . Procrit.     Thrombocytopenia.  Platelet increasing.     CAD/hypertension/hyperlipidemia/CHF.  Aspirin . Lipitor.  Coreg . Catapres.  Plavix.  Increase Procardia.  Hydralazine . Isordil . Aldactone.  Diovan . hydralazine as needed.  Nitro as needed.  Echocardiogram 1/11/2025- 56 - 60%,  mild septal asymmetric hypertrophy, diastolic function is consistent with (grade II w/high LAP) pseudonormalization, Normal right ventricular cavity size and systolic function, left atrial cavity is mild to moderately dilated, right atrial cavity is dilated, Mild aortic valve stenosis, Mild to moderate mitral valve regurgitation, Moderate to severe tricuspid valve regurgitation,  tricuspid regurgitation is markedly elevated (>55 mmHg).     COPD.  Not exacerbation.  DuoNebs.  Singulair.  Hypothyroidism.  Synthroid.  Incentive spirometer.  Patient is on room air.    Elevated TSH.  Free T4 pending.     Reflux . Protonix.  Compazine as needed.  Phenergan as needed.     Prostate hypertrophy.  Flomax.     Anxiety/depression.  Xanax as needed.     Pain.  Morphine as needed.  Percocet as needed.  Neurontin at Night.     Nutrition . Diabetic diet.  Nutrition consult.     Deconditioning.  PT consult.     Medical Decision Making  Number and Complexity of problems: Ischemic left leg/end-stage renal disease/hypokalemia/anemia/thrombocytopenia/CAD/CHF/COPD  Differential Diagnosis: None     Conditions and Status        Condition is unchanged.     [facility] Data  External documents reviewed: Previous note .  Cardiac tracing (EKG, telemetry) interpretation: No monitor  Radiology interpretation: Echo  Labs reviewed: Laboratory  Any tests that were considered but not ordered: Lab in a.m.     Decision rules/scores evaluated (example [id number], Wells,  etc): None     Discussed with: Patient and daughter     Care Planning  Shared decision making: Patient and daughter  Code status and discussions: Full code     Disposition  Social Determinants of Health that impact treatment or disposition: From home  Patient probably need rehab placement.  2 to 5 days.        Electronically signed by Garrett Comer MD, 05/25/25, 09:19 CDT.

## 2025-05-25 NOTE — PLAN OF CARE
Goal Outcome Evaluation:  Plan of Care Reviewed With: patient        Progress: no change  Outcome Evaluation: Pt medicated for PRN pain x2 so far this shift; slept for a good portion of the shift; currently up in chair; voiding per urinal; IID; safety maintained.

## 2025-05-25 NOTE — PROGRESS NOTES
LOS: 4 days   Patient Care Team:  Nathan Zimmer MD as PCP - General (Internal Medicine)  Adrien Brewster MD as Consulting Physician (Gastroenterology)  Eleuterio Farley MD (Inactive) as Cardiologist (Cardiology)  Elena Jon APRN as Referring Physician (Vascular Surgery)  Bolivar Rueda MD as Consulting Physician (Nephrology)  Slava Tate APRN as Nurse Practitioner (Family Medicine)  New Omalley MD as Consulting Physician (Pulmonary Disease)  Becky Camacho APRN as Nurse Practitioner (Hematology and Oncology)  Gil Pineda DO as Consulting Physician (Vascular Surgery)    Chief Complaint: Left foot pain    Subjective     Patient Complaints: Patient reports continued foot pain.     Objective     Vital Signs  Temp:  [99 °F (37.2 °C)-99.6 °F (37.6 °C)] 99.4 °F (37.4 °C)  Heart Rate:  [55-99] 55  Resp:  [16] 16  BP: (166-201)/(54-96) 166/54    Physical Exam  Constitutional:       Appearance: Normal appearance.   Cardiovascular:      Rate and Rhythm: Normal rate.      Comments: Poor pedal signal somewhat improved from surgery.  Cap refill improved.  Pulmonary:      Effort: Pulmonary effort is normal. No respiratory distress.   Skin:     Comments: Access site soft and dressing is CDI wo evidence of ongoing bleeding.    Neurological:      Mental Status: He is alert.         Laboratory Data:   Results from last 7 days   Lab Units 05/25/25  0457 05/24/25  0214 05/23/25  0303   WBC 10*3/mm3 8.92 6.12 8.41   HEMOGLOBIN g/dL 8.5* 6.7* 7.7*   HEMATOCRIT % 26.3* 21.0* 24.7*   PLATELETS 10*3/mm3 113* 86* 100*       Results from last 7 days   Lab Units 05/25/25  0457 05/24/25  0214 05/23/25  0303 05/22/25  1624 05/21/25  1632   SODIUM mmol/L 139 139 139   < > 137   POTASSIUM mmol/L 3.3* 3.0* 3.2*   < > 3.1*   CHLORIDE mmol/L 96* 98 99   < > 95*   CO2 mmol/L 26.0 30.0* 25.0   < > 30.0*   BUN mg/dL 43* 27* 35*   < > 14   CREATININE mg/dL 3.75* 2.99* 3.80*   < > 2.29*    CALCIUM mg/dL 9.3 8.2* 9.2   < > 8.5*   BILIRUBIN mg/dL 0.4  --  0.2  --  0.2   ALK PHOS U/L 102  --  116  --  110   ALT (SGPT) U/L <5  --  5  --  5   AST (SGOT) U/L 20  --  20  --  18   GLUCOSE mg/dL 138* 106* 105*   < > 99    < > = values in this interval not displayed.     Results from last 7 days   Lab Units 05/21/25  2323 05/21/25  1911 05/21/25  1632   PROTIME Seconds  --   --  16.4*   INR   --   --  1.25*   APTT seconds 71.0* 31.3 32.7            Medication Review:     Assessment & Plan       Arterial occlusion    Chronic rhinitis    Essential hypertension    Resistant hypertension    Symptomatic anemia    Peripheral arterial disease    IHD (ischemic heart disease)    Diastolic dysfunction    Anemia due to chronic kidney disease    CLL (chronic lymphocytic leukemia)    Mixed hyperlipidemia    Eustachian tube dysfunction, bilateral    Sensorineural hearing loss (SNHL), bilateral    Anticoagulated    Nasal septal deviation    Chronic diastolic (congestive) heart failure    Abnormal TSH    ESRD (end stage renal disease) on dialysis    Acute pain of left lower extremity    Thrombocytopenia      75-year-old male with PAD who presented with left lower extremity rest pain s/p left lower extremity arteriogram with left external iliac artery shockwave and stenting along with attempted crossing of left popliteal artery occlusion on 5/23/25.    - Patient has continued left foot pain that is still requiring significant amounts of pain medicine.  - Hemoglobin improved after transfusion.   Continue to monitor.  - Had an extensive discussion with the patient this morning regarding the risk and benefits his limited revascularization options.  Also discussed with him the risk and benefits of an above-knee amputation.  - At this point the patient would like to proceed with left above-knee amputation.  - Will continue to follow, and if no significant changes over the weekend we will plan on left above-knee amputation next  week.  - Will cont to follow.   - Continue aspirin Plavix.    Plan for disposition:    Blayne Zabala MD  Vascular Surgery  [phone]  05/25/25  11:48 CDT

## 2025-05-25 NOTE — PROGRESS NOTES
Nephrology ([facility]) Progress Note      Patient:  Ricardo Hugo  YOB: 1950  Date of Service: 5/25/2025  MRN: [id number]   Acct: [id number]   Primary Care Physician: Nathan Zimmer MD  Advance Directive:   Code Status and Medical Interventions: CPR (Attempt to Resuscitate); Full Support   Ordered at: 05/21/25 1822     Code Status (Patient has no pulse and is not breathing):    CPR (Attempt to Resuscitate)     Medical Interventions (Patient has pulse or is breathing):    Full Support     Admit Date: 5/21/2025       Hospital Day: 4  Referring Provider: No ref. provider found      Patient personally seen and examined.  Complete chart including Consults, Notes, Operative Reports, Labs, Cardiology, and Radiology studies reviewed as able.        Subjective:  Ricardo Hugo is a 75 y.o. male for whom we were consulted for evaluation and treatment of end-stage renal disease. He is a Monday Wednesday Friday dialysis patient. He presented for evaluation of left leg pain and was found to have an arterial blockage. He had undergone an angiogram last month and apparently had a retained balloon by chart review that required a second procedure to retrieve. He was taken to the OR by Dr. Zabala for intervention. He was seen immediately after the OR. He was seen with family. He is complaining of pain in the operative leg. Denied other complaint upon questioning. Denied any issues with dialysis. Denied current chest pain, shortness of air at rest, nausea or vomiting.     Today, no overnight events reported.  Complaining of continued leg pain asking for pain medication at time of examination.  Denied any new complaints including chest pain, shortness of air at rest, nausea or vomiting.  Seen without family.        Allergies:  Ondansetron, Zofran [ondansetron hcl], Lortab [hydrocodone-acetaminophen], and Allopurinol    Home Meds:  Medications Prior to Admission   Medication Sig Dispense  Refill Last Dose/Taking    ALPRAZolam (XANAX) 0.25 MG tablet Take 1 tablet by mouth Every Night.   Taking    aspirin (aspirin) 81 MG EC tablet Take 1 tablet by mouth Daily.   Taking    atorvastatin (LIPITOR) 10 MG tablet Take 1 tablet by mouth Daily. 90 tablet 3 Taking    calcitriol (ROCALTROL) 0.5 MCG capsule Take 1 capsule by mouth Daily. 90 capsule 2 Taking    carvedilol (COREG) 25 MG tablet Take 1 tablet by mouth 2 (Two) Times a Day With Meals.   Taking    cholestyramine light 4 g packet Take 1 packet by mouth Daily. 90 packet 1 Taking    cloNIDine (CATAPRES) 0.3 MG tablet Take 1 tablet by mouth 3 times a day.   Taking    clopidogrel (PLAVIX) 75 MG tablet Take 1 tablet by mouth Daily.   Taking    Copper Gluconate (Copper Caps) 2 MG capsule Take 2 mg by mouth Daily.   Taking    empagliflozin (Jardiance) 10 MG tablet tablet Take 1 tablet by mouth Daily.   Taking    fexofenadine (ALLEGRA) 180 MG tablet Take 1 tablet by mouth Daily.   Taking    furosemide (LASIX) 40 MG tablet Take 1 tablet by mouth Daily. 90 tablet 2 Taking    hydrALAZINE (APRESOLINE) 100 MG tablet Take 1 tablet by mouth 3 (Three) Times a Day. 100mg daily   Taking    isosorbide dinitrate (ISORDIL) 10 MG tablet Take 1 tablet by mouth 3 (Three) Times a Day. 270 tablet 2 Taking    levothyroxine (Synthroid) 100 MCG tablet Take 1 tablet by mouth Every Morning. 90 tablet 2 Taking    magnesium chloride ER 64 MG DR tablet Take 1 tablet by mouth Daily.   Taking    Melatonin 10 MG tablet Take 1 tablet by mouth Every Night.   Taking    mesalamine (APRISO) 0.375 g 24 hr capsule Take 4 capsules by mouth Daily. 360 capsule 1 Taking    montelukast (SINGULAIR) 10 MG tablet Take 1 tablet by mouth Every Night.   Taking    Multiple Vitamins-Minerals (PRESERVISION/LUTEIN) capsule Take 1 capsule by mouth 2 (two) times a day.   Taking    NIFEdipine XL (PROCARDIA XL) 60 MG 24 hr tablet Take 1 tablet by mouth Daily.   Taking    omeprazole (priLOSEC) 20 MG capsule Take 1  capsule by mouth Daily.   Taking    sodium bicarbonate 650 MG tablet Take 1 tablet by mouth 5 (Five) Times a Day.   Taking    spironolactone (ALDACTONE) 25 MG tablet Take 1 tablet by mouth Daily.   Taking    tamsulosin (FLOMAX) 0.4 MG capsule 24 hr capsule Take 1 capsule by mouth Every Night.   Taking    valsartan (DIOVAN) 320 MG tablet Take 1 tablet by mouth Daily.   Taking    vitamin D (ERGOCALCIFEROL) 1.25 MG (81088 UT) capsule capsule Take 1 capsule by mouth 1 (One) Time Per Week. Mondays   Taking    albuterol sulfate  (90 Base) MCG/ACT inhaler Inhale 2 puffs Every 6 (Six) Hours As Needed for Wheezing or Shortness of Air.       aspirin-acetaminophen-caffeine (EXCEDRIN MIGRAINE) 250-250-65 MG per tablet Take 1 tablet by mouth Every 6 (Six) Hours As Needed for Headache. (Patient taking differently: Take 3 tablets by mouth Every 6 (Six) Hours As Needed for Headache. Patient reports he takes 6-12 every day.)       Budeson-Glycopyrrol-Formoterol (Breztri Aerosphere) 160-9-4.8 MCG/ACT aerosol inhaler Inhale 2 puffs 2 (Two) Times a Day.       desoximetasone (TOPICORT) 0.25 % cream Apply 1 Application topically to the appropriate area as directed 2 (Two) Times a Day As Needed for Irritation. irritation       diphenhydrAMINE HCl, Sleep, 50 MG/30ML liquid Take 15 mL by mouth At Night As Needed (insomnia).       docusate sodium (COLACE) 100 MG capsule Take 1 capsule by mouth 3 (Three) Times a Day As Needed for Constipation.       fluticasone (FLONASE) 50 MCG/ACT nasal spray Administer 2 sprays into the nostril(s) as directed by provider Daily As Needed for Allergies.          Medicines:  Current Facility-Administered Medications   Medication Dose Route Frequency Provider Last Rate Last Admin    ALPRAZolam (XANAX) tablet 0.25 mg  0.25 mg Oral Nightly PRN Blayne Zabala MD   0.25 mg at 05/22/25 0047    aspirin EC tablet 81 mg  81 mg Oral Daily Blayne Zabala MD   81 mg at 05/25/25 0810    atorvastatin (LIPITOR)  tablet 10 mg  10 mg Oral Daily Blayne Zabala MD   10 mg at 05/25/25 0810    sennosides-docusate (PERICOLACE) 8.6-50 MG per tablet 2 tablet  2 tablet Oral BID PRN Blayne Zabala MD   2 tablet at 05/25/25 0810    And    polyethylene glycol (MIRALAX) packet 17 g  17 g Oral Daily PRN Blayne Zabala MD        And    bisacodyl (DULCOLAX) EC tablet 5 mg  5 mg Oral Daily PRN Blayne Zabala MD        And    bisacodyl (DULCOLAX) suppository 10 mg  10 mg Rectal Daily PRN Blayne Zabala MD        calcitriol (ROCALTROL) capsule 0.5 mcg  0.5 mcg Oral Daily Blayne Zabala MD   0.5 mcg at 05/25/25 0810    carvedilol (COREG) tablet 25 mg  25 mg Oral BID With Meals Blayne Zabala MD   25 mg at 05/25/25 0810    cloNIDine (CATAPRES) tablet 0.3 mg  0.3 mg Oral TID Blayne Zabala MD   0.3 mg at 05/25/25 0810    clopidogrel (PLAVIX) tablet 75 mg  75 mg Oral Daily Blayne Zabala MD   75 mg at 05/25/25 0810    [START ON 5/26/2025] epoetin cecile-epbx (RETACRIT) 5,000 Units 2 mL injection  5,000 Units Subcutaneous Once per day on Monday Wednesday Friday Garrett Comer MD        furosemide (LASIX) injection 20 mg  20 mg Intravenous Once Scott Johnson MD        gabapentin (NEURONTIN) capsule 300 mg  300 mg Oral Nightly Garrett Comer MD   300 mg at 05/24/25 2057    heparin (porcine) injection 3,600 Units  3,600 Units Intravenous Once Twan Quiroz MD        hydrALAZINE (APRESOLINE) injection 10 mg  10 mg Intravenous Q6H PRN Blayne Zabala MD   10 mg at 05/22/25 0654    hydrALAZINE (APRESOLINE) tablet 100 mg  100 mg Oral TID Blayne Zabala MD   100 mg at 05/25/25 0810    ipratropium-albuterol (DUO-NEB) nebulizer solution 3 mL  3 mL Nebulization 4x Daily - RT Blayne Zabala MD   3 mL at 05/25/25 1107    iron polysaccharides (NIFEREX) capsule 150 mg  150 mg Oral Daily Garrett Comer MD   150 mg at 05/25/25 0810    isosorbide dinitrate (ISORDIL) tablet 10 mg  10 mg Oral TID - Nitrates Blayne Zabala MD    10 mg at 05/25/25 0810    levothyroxine (SYNTHROID, LEVOTHROID) tablet 100 mcg  100 mcg Oral Q AM Blayne Zabala MD   100 mcg at 05/25/25 0529    montelukast (SINGULAIR) tablet 10 mg  10 mg Oral Nightly Blayne Zabala MD   10 mg at 05/24/25 2058    Morphine sulfate (PF) injection 1 mg  1 mg Intravenous Q4H PRN Garrett Comer MD   1 mg at 05/25/25 1203    [START ON 5/26/2025] NIFEdipine XL (PROCARDIA XL) 24 hr tablet 90 mg  90 mg Oral Daily Garrett Comer MD        nitroglycerin (NITROSTAT) SL tablet 0.4 mg  0.4 mg Sublingual Q5 Min PRN Blayne Zabala MD        oxyCODONE-acetaminophen (PERCOCET) 5-325 MG per tablet 1 tablet  1 tablet Oral Q4H PRN Garrett Comer MD   1 tablet at 05/25/25 0952    pantoprazole (PROTONIX) EC tablet 40 mg  40 mg Oral Q AM Blayne Zabala MD   40 mg at 05/25/25 0529    prochlorperazine (COMPAZINE) injection 5 mg  5 mg Intravenous Q6H PRN Pancho Camacho MD        Or    prochlorperazine (COMPAZINE) tablet 5 mg  5 mg Oral Q6H PRN Pancho Camacho MD        Or    prochlorperazine (COMPAZINE) suppository 25 mg  25 mg Rectal Q12H PRN Pancho Camacho MD        promethazine (PHENERGAN) 12.5 mg in sodium chloride 0.9 % 50 mL  12.5 mg Intravenous Q6H PRN Pancho Camacho MD        sodium chloride 0.9 % flush 10 mL  10 mL Intravenous PRN Blayne Zabala MD        sodium chloride 0.9 % flush 10 mL  10 mL Intravenous Q12H Blayne Zabala MD   10 mL at 05/25/25 0811    sodium chloride 0.9 % flush 10 mL  10 mL Intravenous PRN Blayne Zabala MD        sodium chloride 0.9 % flush 10 mL  10 mL Intravenous Q12H Blayne Zabala MD   10 mL at 05/25/25 0809    sodium chloride 0.9 % flush 10 mL  10 mL Intravenous PRN Blayne Zabala MD        sodium chloride 0.9 % infusion 40 mL  40 mL Intravenous PRN Blanye Zabala MD        sodium chloride 0.9 % infusion 40 mL  40 mL Intravenous PRN Blayne Zabala MD        spironolactone (ALDACTONE) tablet 25 mg  25 mg Oral Daily Vj,  MD Blayne   25 mg at 05/25/25 0810    tamsulosin (FLOMAX) 24 hr capsule 0.4 mg  0.4 mg Oral Nightly Blayne Zabala MD   0.4 mg at 05/24/25 2057    valsartan (DIOVAN) tablet 320 mg  320 mg Oral Daily Blayne Zabala MD   320 mg at 05/25/25 0810       Past Medical History:  Past Medical History:   Diagnosis Date    3-vessel CAD 08/11/2020    Allergic rhinitis     Anemia     Anxiety disorder 04/27/2020    Arthritis     Asymmetrical sensorineural hearing loss 06/28/2017    Atherosclerosis of native artery of both lower extremities with intermittent claudication 07/18/2019    Avascular necrosis of femoral head, left 07/11/2020    right hip after surgery    Carotid stenosis     Chronic mucoid otitis media     Chronic rhinitis     COPD (chronic obstructive pulmonary disease)     Coronary artery disease     HEART BYPASS 2004    Crohn's disease of large intestine with other complication 07/30/2020    Chronic diarrhea Colonoscopy July 2020 revealed mild patchy scattered hemosiderin staining with inflammation more so in rectosigmoid area.  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    Iron deficiency anemia 07/14/2020    Mixed hearing loss of left ear     PAD (peripheral artery disease) 08/11/2020    Perianal abscess     Pernicious anemia 08/17/2020    took shots but never diagnosed with b12 deficiency    Personal history of alcoholism 08/11/2020    quit drinking in 2013    Prostatic hypertrophy 08/11/2020    Sensorineural hearing loss     Sepsis with acute renal failure 09/15/2020    Shortness of breath 05/27/2021    Tinnitus     Ventricular tachycardia,  nonsustained 07/14/2020    Weight loss 07/11/2020       Past Surgical History:  Past Surgical History:   Procedure Laterality Date    AORTOGRAM Right 4/25/2025    Procedure: RIGHT LOWER EXTREMITY ANGIOGRAM, SHOCKWAVE LITHOTRIPSY, BALLOON ANGIOPLASTY, MYNX CLOSURE;  Surgeon: Gil Pineda DO;  Location: [facility] HYBRID OR;  Service: Vascular;  Laterality: Right;    AORTOGRAM Left 5/22/2025    Procedure: LEFT LOWER EXTREMITY ANGIOGRAM, SHOCKWAVE LITHOTRIPSY, BALLOON ANGIOPLASTY, STENT PLACEMENT, MYNX CLOSURE;  Surgeon: Blayne Zabala MD;  Location: [facility] HYBRID OR;  Service: Vascular;  Laterality: Left;    ARTERY SURGERY  2021    right side on neck    CAROTID ENDARTERECTOMY Right 05/10/2021    Procedure: RIGHT CAROTID ENDARTERECTOMY WITH EEG;  Surgeon: Gil Pineda DO;  Location: [facility] HYBRID OR 12;  Service: Vascular;  Laterality: Right;    COLONOSCOPY N/A 07/02/2020    Procedure: COLONOSCOPY WITH ANESTHESIA;  Surgeon: Adrien Brewster MD;  Location: [facility] ENDOSCOPY;  Service: Gastroenterology;  Laterality: N/A;  pre op: diarrhea  post op: polyps  PCP: Joe Velasco MD    COLONOSCOPY N/A 10/13/2020    Procedure: COLONOSCOPY WITH ANESTHESIA;  Surgeon: Adrien Brewster MD;  Location: [facility] ENDOSCOPY;  Service: Gastroenterology;  Laterality: N/A;  Pre: Chronic Diarrhea, Crohn's  Post: AVM  Dr. Neftali Velasco  CO2 Inflation Used    COLONOSCOPY N/A 12/08/2023    Procedure: COLONOSCOPY WITH ANESTHESIA;  Surgeon: Adrien Brewster MD;  Location: [facility] ENDOSCOPY;  Service: Gastroenterology;  Laterality: N/A;  pre chrone's disease  post sub optimal prep, polyp, chrone's      CORONARY ARTERY BYPASS GRAFT  2003    x3    ENDOSCOPY N/A 11/02/2021    Procedure: ESOPHAGOGASTRODUODENOSCOPY WITH ANESTHESIA;  Surgeon: Bridger Bell MD;  Location: [facility] ENDOSCOPY;  Service: Gastroenterology;  Laterality: N/A;  pre anemia;gi bleed  post  gi bleed;schatski ring  Dr. ERIC Velasco    ENDOSCOPY N/A  10/10/2023    Procedure: ESOPHAGOGASTRODUODENOSCOPY WITH ANESTHESIA;  Surgeon: Adrien Brewster MD;  Location: [facility] ENDOSCOPY;  Service: Gastroenterology;  Laterality: N/A;  preop; anemia  postop esophagitis ; R/O barretts   PCP Randall Beata    EYE SURGERY Bilateral     catorac    INCISION AND DRAINAGE PERIRECTAL ABSCESS N/A 2017    Procedure: INCISION AND DRAINAGE OF JEET ANAL ABSCESS;  Surgeon: Lynette Smith MD;  Location: [facility] OR;  Service:     INGUINAL HERNIA REPAIR Bilateral 2023    Procedure: INGUINAL HERNIA BILATERAL REPAIR LAPAROSCOPIC WITH DAVINCI ROBOT WITH MESH;  Surgeon: Tahira Rivera MD;  Location:  [facility] OR;  Service: Robotics - DaVinci;  Laterality: Bilateral;    INSERTION HEMODIALYSIS CATHETER N/A 2025    Procedure: HEMODIALYSIS CATHETER PLACEMENT;  Surgeon: Gil Pineda DO;  Location: [facility] HYBRID OR;  Service: Vascular;  Laterality: N/A;    MYRINGOTOMY W/ TUBES Left 2017    06/10/2016    TONSILLECTOMY      TOTAL HIP ARTHROPLASTY Right        Family History  Family History   Problem Relation Age of Onset    Breast cancer Mother     Dementia Father     Glaucoma Father     No Known Problems Daughter     Colon polyps Neg Hx     Colon cancer Neg Hx        Social History  Social History     Socioeconomic History    Marital status:    Tobacco Use    Smoking status: Former     Current packs/day: 0.00     Average packs/day: 0.5 packs/day for 25.8 years (12.9 ttl pk-yrs)     Types: Cigarettes     Start date:      Quit date: 10/13/2013     Years since quittin.6     Passive exposure: Past    Smokeless tobacco: Never    Tobacco comments:     quit    Vaping Use    Vaping status: Former    Substances: Nicotine    Devices: Pre-filled or refillable cartridge   Substance and Sexual Activity    Alcohol use: Not Currently    Drug use: No    Sexual activity: Not Currently     Partners: Female       Review of Systems:  History obtained from chart  review and the patient  General ROS: No fever or chills  Respiratory ROS: No cough, shortness of breath, wheezing  Cardiovascular ROS: No chest pain or palpitations  Gastrointestinal ROS: No abdominal pain or melena  Genito-Urinary ROS: No dysuria or hematuria  Psych ROS: No anxiety and depression  14 point ROS reviewed with the patient and negative except as noted above and in the HPI unless unable to obtain.    Objective:  Patient Vitals for the past 24 hrs:   BP Temp Temp src Pulse Resp SpO2 Weight   05/25/25 1201 121/41 98.1 °F (36.7 °C) Oral 59 16 95 % --   05/25/25 1115 -- -- -- 55 16 100 % --   05/25/25 1107 -- -- -- 85 16 98 % --   05/25/25 0738 -- -- -- 83 16 100 % --   05/25/25 0732 -- -- -- 79 16 96 % --   05/25/25 0700 -- -- -- -- -- -- 65.3 kg (143 lb 15.4 oz)   05/25/25 0600 166/54 99.4 °F (37.4 °C) Oral 76 -- 96 % --   05/25/25 0438 179/67 99.6 °F (37.6 °C) Oral 83 16 93 % --   05/25/25 0000 (!) 188/96 -- -- 99 -- 93 % --   05/24/25 2055 (!) 201/62 -- -- 79 -- -- --   05/24/25 1934 (!) 192/71 99.3 °F (37.4 °C) Oral 86 16 94 % --   05/24/25 1559 (!) 194/60 99 °F (37.2 °C) Oral 59 16 97 % --   05/24/25 1554 -- -- -- 77 16 100 % --   05/24/25 1548 -- -- -- 72 16 95 % --       Intake/Output Summary (Last 24 hours) at 5/25/2025 1349  Last data filed at 5/25/2025 0900  Gross per 24 hour   Intake 600 ml   Output 550 ml   Net 50 ml     General: awake/alert   Chest:  clear to auscultation bilaterally without respiratory distress  CVS: regular rate and rhythm  Abdominal: soft, nontender, positive bowel sounds  Extremities: no cyanosis or edema  Skin: warm and dry without rash      Labs:  Results from last 7 days   Lab Units 05/25/25  0457 05/24/25  0214 05/23/25  0303   WBC 10*3/mm3 8.92 6.12 8.41   HEMOGLOBIN g/dL 8.5* 6.7* 7.7*   HEMATOCRIT % 26.3* 21.0* 24.7*   PLATELETS 10*3/mm3 113* 86* 100*         Results from last 7 days   Lab Units 05/25/25  0457 05/24/25  0214 05/23/25  0303 05/22/25  1624  "05/21/25  1632   SODIUM mmol/L 139 139 139   < > 137   POTASSIUM mmol/L 3.3* 3.0* 3.2*   < > 3.1*   CHLORIDE mmol/L 96* 98 99   < > 95*   CO2 mmol/L 26.0 30.0* 25.0   < > 30.0*   BUN mg/dL 43* 27* 35*   < > 14   CREATININE mg/dL 3.75* 2.99* 3.80*   < > 2.29*   CALCIUM mg/dL 9.3 8.2* 9.2   < > 8.5*   EGFR mL/min/1.73 15.9* 20.8* 15.6*   < > 28.7*   BILIRUBIN mg/dL 0.4  --  0.2  --  0.2   ALK PHOS U/L 102  --  116  --  110   ALT (SGPT) U/L <5  --  5  --  5   AST (SGOT) U/L 20  --  20  --  18   GLUCOSE mg/dL 138* 106* 105*   < > 99    < > = values in this interval not displayed.       Radiology:   Imaging Results (Last 72 Hours)       Procedure Component Value Units Date/Time    IR Angiogram Extremity [[id number]] Collected: 05/23/25 0643     Updated: 05/23/25 1220            Culture:  No results found for: \"BLOODCX\", \"URINECX\", \"WOUNDCX\", \"MRSACX\", \"RESPCX\", \"STOOLCX\"      Assessment   End-stage renal disease  Hypertension  Anemia and chronic kidney disease  Peripheral vascular disease  Thrombocytopenia  Hypokalemia     Plan:  Discussed with patient, nursing, family  Workup reviewed today  Monitor labs  Dialysis Monday Wednesday Friday per routine scheduling  Vascular evaluation reviewed as current, noted plans for AKA sometime later next week  Adjust blood pressure medications as needed postdialysis      Twan Quiroz MD  5/25/2025  13:49 CDT      "

## 2025-05-25 NOTE — PLAN OF CARE
Goal Outcome Evaluation:   Patient remains alert and oriented x4, room air with good o2 saturations, Consistent complaints of pain in left leg PRN pain meds given, Right groin site started bleeding again: changed dressing- Surgicel, gauze, transparent dressing. Sandbag also placed on top of site. BP elevated this shift. No c/o other than leg pain, safety maintained.

## 2025-05-26 LAB
ANION GAP SERPL CALCULATED.3IONS-SCNC: 13 MMOL/L (ref 5–15)
BUN SERPL-MCNC: 62 MG/DL (ref 8–23)
BUN/CREAT SERPL: 13.9 (ref 7–25)
CALCIUM SPEC-SCNC: 8.8 MG/DL (ref 8.6–10.5)
CHLORIDE SERPL-SCNC: 97 MMOL/L (ref 98–107)
CO2 SERPL-SCNC: 26 MMOL/L (ref 22–29)
CREAT SERPL-MCNC: 4.45 MG/DL (ref 0.76–1.27)
DEPRECATED RDW RBC AUTO: 68.9 FL (ref 37–54)
EGFRCR SERPLBLD CKD-EPI 2021: 12.9 ML/MIN/1.73
ERYTHROCYTE [DISTWIDTH] IN BLOOD BY AUTOMATED COUNT: 18.5 % (ref 12.3–15.4)
GLUCOSE SERPL-MCNC: 97 MG/DL (ref 65–99)
HCT VFR BLD AUTO: 24.8 % (ref 37.5–51)
HGB BLD-MCNC: 8 G/DL (ref 13–17.7)
MCH RBC QN AUTO: 32.5 PG (ref 26.6–33)
MCHC RBC AUTO-ENTMCNC: 32.3 G/DL (ref 31.5–35.7)
MCV RBC AUTO: 100.8 FL (ref 79–97)
PLATELET # BLD AUTO: 85 10*3/MM3 (ref 140–450)
PMV BLD AUTO: 10.2 FL (ref 6–12)
POTASSIUM SERPL-SCNC: 4 MMOL/L (ref 3.5–5.2)
RBC # BLD AUTO: 2.46 10*6/MM3 (ref 4.14–5.8)
SODIUM SERPL-SCNC: 136 MMOL/L (ref 136–145)
WBC NRBC COR # BLD AUTO: 6.58 10*3/MM3 (ref 3.4–10.8)

## 2025-05-26 PROCEDURE — 25010000002 HEPARIN (PORCINE) PER 1000 UNITS: Performed by: INTERNAL MEDICINE

## 2025-05-26 PROCEDURE — 25010000002 MORPHINE PER 10 MG: Performed by: FAMILY MEDICINE

## 2025-05-26 PROCEDURE — 94799 UNLISTED PULMONARY SVC/PX: CPT

## 2025-05-26 PROCEDURE — 85027 COMPLETE CBC AUTOMATED: CPT | Performed by: FAMILY MEDICINE

## 2025-05-26 PROCEDURE — 25010000002 EPOETIN ALFA-EPBX 3000 UNIT/ML SOLUTION 1 ML VIAL: Performed by: FAMILY MEDICINE

## 2025-05-26 PROCEDURE — 80048 BASIC METABOLIC PNL TOTAL CA: CPT | Performed by: FAMILY MEDICINE

## 2025-05-26 PROCEDURE — 94761 N-INVAS EAR/PLS OXIMETRY MLT: CPT

## 2025-05-26 PROCEDURE — 25010000002 EPOETIN ALFA-EPBX 2000 UNIT/ML SOLUTION 1 ML VIAL: Performed by: FAMILY MEDICINE

## 2025-05-26 RX ORDER — HEPARIN SODIUM 1000 [USP'U]/ML
3200 INJECTION, SOLUTION INTRAVENOUS; SUBCUTANEOUS AS NEEDED
Status: DISCONTINUED | OUTPATIENT
Start: 2025-05-26 | End: 2025-06-05 | Stop reason: HOSPADM

## 2025-05-26 RX ORDER — NIFEDIPINE 60 MG/1
60 TABLET, EXTENDED RELEASE ORAL DAILY
Status: DISCONTINUED | OUTPATIENT
Start: 2025-05-27 | End: 2025-05-27

## 2025-05-26 RX ADMIN — MORPHINE SULFATE 1 MG: 2 INJECTION, SOLUTION INTRAMUSCULAR; INTRAVENOUS at 21:22

## 2025-05-26 RX ADMIN — Medication 10 ML: at 20:58

## 2025-05-26 RX ADMIN — CALCITRIOL CAPSULES 0.25 MCG 0.5 MCG: 0.25 CAPSULE ORAL at 09:23

## 2025-05-26 RX ADMIN — GABAPENTIN 300 MG: 300 CAPSULE ORAL at 20:53

## 2025-05-26 RX ADMIN — OXYCODONE HYDROCHLORIDE AND ACETAMINOPHEN 1 TABLET: 5; 325 TABLET ORAL at 19:27

## 2025-05-26 RX ADMIN — MORPHINE SULFATE 1 MG: 2 INJECTION, SOLUTION INTRAMUSCULAR; INTRAVENOUS at 06:34

## 2025-05-26 RX ADMIN — HEPARIN SODIUM 3200 UNITS: 1000 INJECTION INTRAVENOUS; SUBCUTANEOUS at 14:00

## 2025-05-26 RX ADMIN — EPOETIN ALFA-EPBX 5000 UNITS: 3000 INJECTION, SOLUTION INTRAVENOUS; SUBCUTANEOUS at 09:25

## 2025-05-26 RX ADMIN — IPRATROPIUM BROMIDE AND ALBUTEROL SULFATE 3 ML: .5; 3 SOLUTION RESPIRATORY (INHALATION) at 21:41

## 2025-05-26 RX ADMIN — LEVOTHYROXINE SODIUM 100 MCG: 0.1 TABLET ORAL at 05:28

## 2025-05-26 RX ADMIN — ASPIRIN 81 MG: 81 TABLET, COATED ORAL at 09:24

## 2025-05-26 RX ADMIN — OXYCODONE HYDROCHLORIDE AND ACETAMINOPHEN 1 TABLET: 5; 325 TABLET ORAL at 09:23

## 2025-05-26 RX ADMIN — MORPHINE SULFATE 1 MG: 2 INJECTION, SOLUTION INTRAMUSCULAR; INTRAVENOUS at 17:20

## 2025-05-26 RX ADMIN — OXYCODONE HYDROCHLORIDE AND ACETAMINOPHEN 1 TABLET: 5; 325 TABLET ORAL at 05:28

## 2025-05-26 RX ADMIN — MONTELUKAST SODIUM 10 MG: 10 TABLET, COATED ORAL at 20:53

## 2025-05-26 RX ADMIN — OXYCODONE HYDROCHLORIDE AND ACETAMINOPHEN 1 TABLET: 5; 325 TABLET ORAL at 15:25

## 2025-05-26 RX ADMIN — Medication 10 ML: at 09:27

## 2025-05-26 RX ADMIN — MORPHINE SULFATE 1 MG: 2 INJECTION, SOLUTION INTRAMUSCULAR; INTRAVENOUS at 11:16

## 2025-05-26 RX ADMIN — CLOPIDOGREL BISULFATE 75 MG: 75 TABLET, FILM COATED ORAL at 09:23

## 2025-05-26 RX ADMIN — MORPHINE SULFATE 1 MG: 2 INJECTION, SOLUTION INTRAMUSCULAR; INTRAVENOUS at 01:54

## 2025-05-26 RX ADMIN — TAMSULOSIN HYDROCHLORIDE 0.4 MG: 0.4 CAPSULE ORAL at 20:53

## 2025-05-26 RX ADMIN — PANTOPRAZOLE SODIUM 40 MG: 40 TABLET, DELAYED RELEASE ORAL at 05:28

## 2025-05-26 RX ADMIN — OXYCODONE HYDROCHLORIDE AND ACETAMINOPHEN 1 TABLET: 5; 325 TABLET ORAL at 23:26

## 2025-05-26 RX ADMIN — ALPRAZOLAM 0.25 MG: 0.25 TABLET ORAL at 20:57

## 2025-05-26 RX ADMIN — IPRATROPIUM BROMIDE AND ALBUTEROL SULFATE 3 ML: .5; 3 SOLUTION RESPIRATORY (INHALATION) at 07:39

## 2025-05-26 RX ADMIN — Medication 150 MG: at 09:23

## 2025-05-26 NOTE — PLAN OF CARE
Admitted 5/21 for L lower extrem leg pain  Consult vascular surgery; arterial occlusion; plan for L AKA this week; continue ASA, plavix  Consult nephro; HD management; HD MWF    Problem: Adult Inpatient Plan of Care  Goal: Plan of Care Review  Outcome: Progressing  Flowsheets (Taken 5/26/2025 0404)  Progress: no change     Problem: Pain Acute  Goal: Optimal Pain Control and Function  Outcome: Progressing  Intervention: Optimize Psychosocial Wellbeing  Recent Flowsheet Documentation  Taken 5/25/2025 2002 by Denzel Cook RN  Diversional Activities: (laptop) computer  Intervention: Develop Pain Management Plan  Recent Flowsheet Documentation  Taken 5/25/2025 2210 by Denzel Cook RN  Pain Management Interventions: pain medication given     Problem: Fall Injury Risk  Goal: Absence of Fall and Fall-Related Injury  Outcome: Progressing  Intervention: Promote Injury-Free Environment  Recent Flowsheet Documentation  Taken 5/26/2025 0200 by Denzel Cook RN  Safety Promotion/Fall Prevention: safety round/check completed  Taken 5/26/2025 0154 by Denzel Cook RN  Safety Promotion/Fall Prevention: safety round/check completed  Taken 5/26/2025 0000 by Denzel Cook RN  Safety Promotion/Fall Prevention: safety round/check completed  Taken 5/25/2025 2300 by Denzel Cook RN  Safety Promotion/Fall Prevention: safety round/check completed  Taken 5/25/2025 2210 by Denzel Cook RN  Safety Promotion/Fall Prevention: safety round/check completed  Taken 5/25/2025 2002 by Denzel Cook RN  Safety Promotion/Fall Prevention: safety round/check completed  Taken 5/25/2025 1915 by Denzel Cook RN  Safety Promotion/Fall Prevention: safety round/check completed   Goal Outcome Evaluation:           Progress: no change

## 2025-05-26 NOTE — PROGRESS NOTES
Nephrology ([facility]) Progress Note      Patient:  Ricardo Hugo  YOB: 1950  Date of Service: 5/26/2025  MRN: [id number]   Acct: [id number]   Primary Care Physician: Nathan Zimmer MD  Advance Directive:   Code Status and Medical Interventions: CPR (Attempt to Resuscitate); Full Support   Ordered at: 05/21/25 1822     Code Status (Patient has no pulse and is not breathing):    CPR (Attempt to Resuscitate)     Medical Interventions (Patient has pulse or is breathing):    Full Support     Admit Date: 5/21/2025       Hospital Day: 5  Referring Provider: No ref. provider found      Patient personally seen and examined.  Complete chart including Consults, Notes, Operative Reports, Labs, Cardiology, and Radiology studies reviewed as able.        Subjective:  Ricardo Hugo is a 75 y.o. male for whom we were consulted for evaluation and treatment of end-stage renal disease. He is a Monday Wednesday Friday dialysis patient. He presented for evaluation of left leg pain and was found to have an arterial blockage. He had undergone an angiogram last month and apparently had a retained balloon by chart review that required a second procedure to retrieve. He was taken to the OR by Dr. Zabala for intervention. He was seen immediately after the OR. He was seen with family. He is complaining of pain in the operative leg. Denied other complaint upon questioning. Denied any issues with dialysis. Denied current chest pain, shortness of air at rest, nausea or vomiting.     Today, no overnight events reported.  Complaining of continued leg pain.  Denied any new complaints including chest pain, shortness of air at rest, nausea or vomiting.  Seen without family.    Dialysis   Patient was seen and evaluated on renal replacement therapy and I have personally evaluated the patient and directed the therapy  Modality: Hemodialysis  Access: Catheter  Location: right upper  QB: 450  QD: 700  UF:  690          Allergies:  Ondansetron, Zofran [ondansetron hcl], Lortab [hydrocodone-acetaminophen], and Allopurinol    Home Meds:  Medications Prior to Admission   Medication Sig Dispense Refill Last Dose/Taking    ALPRAZolam (XANAX) 0.25 MG tablet Take 1 tablet by mouth Every Night.   Taking    aspirin (aspirin) 81 MG EC tablet Take 1 tablet by mouth Daily.   Taking    atorvastatin (LIPITOR) 10 MG tablet Take 1 tablet by mouth Daily. 90 tablet 3 Taking    calcitriol (ROCALTROL) 0.5 MCG capsule Take 1 capsule by mouth Daily. 90 capsule 2 Taking    carvedilol (COREG) 25 MG tablet Take 1 tablet by mouth 2 (Two) Times a Day With Meals.   Taking    cholestyramine light 4 g packet Take 1 packet by mouth Daily. 90 packet 1 Taking    cloNIDine (CATAPRES) 0.3 MG tablet Take 1 tablet by mouth 3 times a day.   Taking    clopidogrel (PLAVIX) 75 MG tablet Take 1 tablet by mouth Daily.   Taking    Copper Gluconate (Copper Caps) 2 MG capsule Take 2 mg by mouth Daily.   Taking    empagliflozin (Jardiance) 10 MG tablet tablet Take 1 tablet by mouth Daily.   Taking    fexofenadine (ALLEGRA) 180 MG tablet Take 1 tablet by mouth Daily.   Taking    furosemide (LASIX) 40 MG tablet Take 1 tablet by mouth Daily. 90 tablet 2 Taking    hydrALAZINE (APRESOLINE) 100 MG tablet Take 1 tablet by mouth 3 (Three) Times a Day. 100mg daily   Taking    isosorbide dinitrate (ISORDIL) 10 MG tablet Take 1 tablet by mouth 3 (Three) Times a Day. 270 tablet 2 Taking    levothyroxine (Synthroid) 100 MCG tablet Take 1 tablet by mouth Every Morning. 90 tablet 2 Taking    magnesium chloride ER 64 MG DR tablet Take 1 tablet by mouth Daily.   Taking    Melatonin 10 MG tablet Take 1 tablet by mouth Every Night.   Taking    mesalamine (APRISO) 0.375 g 24 hr capsule Take 4 capsules by mouth Daily. 360 capsule 1 Taking    montelukast (SINGULAIR) 10 MG tablet Take 1 tablet by mouth Every Night.   Taking    Multiple Vitamins-Minerals (PRESERVISION/LUTEIN) capsule Take  1 capsule by mouth 2 (two) times a day.   Taking    NIFEdipine XL (PROCARDIA XL) 60 MG 24 hr tablet Take 1 tablet by mouth Daily.   Taking    omeprazole (priLOSEC) 20 MG capsule Take 1 capsule by mouth Daily.   Taking    sodium bicarbonate 650 MG tablet Take 1 tablet by mouth 5 (Five) Times a Day.   Taking    spironolactone (ALDACTONE) 25 MG tablet Take 1 tablet by mouth Daily.   Taking    tamsulosin (FLOMAX) 0.4 MG capsule 24 hr capsule Take 1 capsule by mouth Every Night.   Taking    valsartan (DIOVAN) 320 MG tablet Take 1 tablet by mouth Daily.   Taking    vitamin D (ERGOCALCIFEROL) 1.25 MG (44542 UT) capsule capsule Take 1 capsule by mouth 1 (One) Time Per Week. Mondays   Taking    albuterol sulfate  (90 Base) MCG/ACT inhaler Inhale 2 puffs Every 6 (Six) Hours As Needed for Wheezing or Shortness of Air.       aspirin-acetaminophen-caffeine (EXCEDRIN MIGRAINE) 250-250-65 MG per tablet Take 1 tablet by mouth Every 6 (Six) Hours As Needed for Headache. (Patient taking differently: Take 3 tablets by mouth Every 6 (Six) Hours As Needed for Headache. Patient reports he takes 6-12 every day.)       Budeson-Glycopyrrol-Formoterol (Breztri Aerosphere) 160-9-4.8 MCG/ACT aerosol inhaler Inhale 2 puffs 2 (Two) Times a Day.       desoximetasone (TOPICORT) 0.25 % cream Apply 1 Application topically to the appropriate area as directed 2 (Two) Times a Day As Needed for Irritation. irritation       diphenhydrAMINE HCl, Sleep, 50 MG/30ML liquid Take 15 mL by mouth At Night As Needed (insomnia).       docusate sodium (COLACE) 100 MG capsule Take 1 capsule by mouth 3 (Three) Times a Day As Needed for Constipation.       fluticasone (FLONASE) 50 MCG/ACT nasal spray Administer 2 sprays into the nostril(s) as directed by provider Daily As Needed for Allergies.          Medicines:  Current Facility-Administered Medications   Medication Dose Route Frequency Provider Last Rate Last Admin    ALPRAZolam (XANAX) tablet 0.25 mg  0.25  mg Oral Nightly PRN Blayne Zabala MD   0.25 mg at 05/25/25 2358    aspirin EC tablet 81 mg  81 mg Oral Daily Blayne Zabala MD   81 mg at 05/26/25 0924    atorvastatin (LIPITOR) tablet 10 mg  10 mg Oral Daily Blayne Zabala MD   10 mg at 05/25/25 0810    sennosides-docusate (PERICOLACE) 8.6-50 MG per tablet 2 tablet  2 tablet Oral BID PRN Blayne Zabala MD   2 tablet at 05/25/25 0810    And    polyethylene glycol (MIRALAX) packet 17 g  17 g Oral Daily PRN Blayne Zabala MD        And    bisacodyl (DULCOLAX) EC tablet 5 mg  5 mg Oral Daily PRN Blayne Zabala MD        And    bisacodyl (DULCOLAX) suppository 10 mg  10 mg Rectal Daily PRN Blayne Zabala MD        calcitriol (ROCALTROL) capsule 0.5 mcg  0.5 mcg Oral Daily Blayne Zabala MD   0.5 mcg at 05/26/25 0923    carvedilol (COREG) tablet 25 mg  25 mg Oral BID With Meals Blayne Zabala MD   25 mg at 05/25/25 0810    cloNIDine (CATAPRES) tablet 0.3 mg  0.3 mg Oral TID Blayne Zabala MD   0.3 mg at 05/25/25 0810    clopidogrel (PLAVIX) tablet 75 mg  75 mg Oral Daily Blayne Zabala MD   75 mg at 05/26/25 0923    epoetin cecile-epbx (RETACRIT) 5,000 Units 2 mL injection  5,000 Units Subcutaneous Once per day on Monday Wednesday Friday Garrett Comer MD   5,000 Units at 05/26/25 0925    furosemide (LASIX) injection 20 mg  20 mg Intravenous Once Scott Johnson MD        gabapentin (NEURONTIN) capsule 300 mg  300 mg Oral Nightly Garrett Comer MD   300 mg at 05/25/25 2001    heparin (porcine) injection 3,600 Units  3,600 Units Intravenous Once Twan Quiroz MD        hydrALAZINE (APRESOLINE) injection 10 mg  10 mg Intravenous Q6H PRN Blayne Zabala MD   10 mg at 05/22/25 0654    hydrALAZINE (APRESOLINE) tablet 100 mg  100 mg Oral TID Blayne Zabala MD   100 mg at 05/25/25 0810    ipratropium-albuterol (DUO-NEB) nebulizer solution 3 mL  3 mL Nebulization 4x Daily - RT Blayne Zabala MD   3 mL at 05/26/25 0739    iron  polysaccharides (NIFEREX) capsule 150 mg  150 mg Oral Daily Garrett Comer MD   150 mg at 05/26/25 0923    isosorbide dinitrate (ISORDIL) tablet 10 mg  10 mg Oral TID - Nitrates Blayne Zabala MD   10 mg at 05/25/25 1827    levothyroxine (SYNTHROID, LEVOTHROID) tablet 100 mcg  100 mcg Oral Q AM Blayne Zabala MD   100 mcg at 05/26/25 0528    montelukast (SINGULAIR) tablet 10 mg  10 mg Oral Nightly Blayne Zabala MD   10 mg at 05/25/25 2001    Morphine sulfate (PF) injection 1 mg  1 mg Intravenous Q4H PRN Garrett Comer MD   1 mg at 05/26/25 1116    NIFEdipine XL (PROCARDIA XL) 24 hr tablet 90 mg  90 mg Oral Daily Garrett Comer MD        nitroglycerin (NITROSTAT) SL tablet 0.4 mg  0.4 mg Sublingual Q5 Min PRN Blayne Zabala MD        oxyCODONE-acetaminophen (PERCOCET) 5-325 MG per tablet 1 tablet  1 tablet Oral Q4H PRN Garrett Comer MD   1 tablet at 05/26/25 0923    pantoprazole (PROTONIX) EC tablet 40 mg  40 mg Oral Q AM Blayne Zabala MD   40 mg at 05/26/25 0528    prochlorperazine (COMPAZINE) injection 5 mg  5 mg Intravenous Q6H PRN Pancho Camacho MD        Or    prochlorperazine (COMPAZINE) tablet 5 mg  5 mg Oral Q6H PRN Pancho Camacho MD        Or    prochlorperazine (COMPAZINE) suppository 25 mg  25 mg Rectal Q12H PRN Pancho Camacho MD        promethazine (PHENERGAN) 12.5 mg in sodium chloride 0.9 % 50 mL  12.5 mg Intravenous Q6H PRN Pancho Camacho MD        sodium chloride 0.9 % flush 10 mL  10 mL Intravenous PRN Blayne Zabala MD        sodium chloride 0.9 % flush 10 mL  10 mL Intravenous Q12H Blayne Zabala MD   10 mL at 05/26/25 0927    sodium chloride 0.9 % flush 10 mL  10 mL Intravenous PRN Blayne Zabala MD        sodium chloride 0.9 % flush 10 mL  10 mL Intravenous Q12H Blayne Zabala MD   10 mL at 05/26/25 0927    sodium chloride 0.9 % flush 10 mL  10 mL Intravenous PRN Blayne Zabala MD        sodium chloride 0.9 % infusion 40 mL  40 mL Intravenous PRN  Blayne Zabala MD        sodium chloride 0.9 % infusion 40 mL  40 mL Intravenous PRN Blayne Zabala MD        spironolactone (ALDACTONE) tablet 25 mg  25 mg Oral Daily Blayne Zabala MD   25 mg at 05/25/25 0810    tamsulosin (FLOMAX) 24 hr capsule 0.4 mg  0.4 mg Oral Nightly Blayne Zabala MD   0.4 mg at 05/25/25 2001    valsartan (DIOVAN) tablet 320 mg  320 mg Oral Daily Blayne Zabala MD   320 mg at 05/25/25 0810       Past Medical History:  Past Medical History:   Diagnosis Date    3-vessel CAD 08/11/2020    Allergic rhinitis     Anemia     Anxiety disorder 04/27/2020    Arthritis     Asymmetrical sensorineural hearing loss 06/28/2017    Atherosclerosis of native artery of both lower extremities with intermittent claudication 07/18/2019    Avascular necrosis of femoral head, left 07/11/2020    right hip after surgery    Carotid stenosis     Chronic mucoid otitis media     Chronic rhinitis     COPD (chronic obstructive pulmonary disease)     Coronary artery disease     HEART BYPASS 2004    Crohn's disease of large intestine with other complication 07/30/2020    Chronic diarrhea Colonoscopy July 2020 revealed mild patchy scattered hemosiderin staining with inflammation more so in rectosigmoid area.  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    Iron deficiency anemia 07/14/2020    Mixed hearing loss of left ear     PAD (peripheral artery disease) 08/11/2020    Perianal abscess     Pernicious anemia 08/17/2020    took shots but never diagnosed with b12 deficiency    Personal history of alcoholism 08/11/2020    quit drinking in 2013    Prostatic  hypertrophy 08/11/2020    Sensorineural hearing loss     Sepsis with acute renal failure 09/15/2020    Shortness of breath 05/27/2021    Tinnitus     Ventricular tachycardia, nonsustained 07/14/2020    Weight loss 07/11/2020       Past Surgical History:  Past Surgical History:   Procedure Laterality Date    AORTOGRAM Right 4/25/2025    Procedure: RIGHT LOWER EXTREMITY ANGIOGRAM, SHOCKWAVE LITHOTRIPSY, BALLOON ANGIOPLASTY, MYNX CLOSURE;  Surgeon: Gil Pineda DO;  Location: [facility] HYBRID OR;  Service: Vascular;  Laterality: Right;    AORTOGRAM Left 5/22/2025    Procedure: LEFT LOWER EXTREMITY ANGIOGRAM, SHOCKWAVE LITHOTRIPSY, BALLOON ANGIOPLASTY, STENT PLACEMENT, MYNX CLOSURE;  Surgeon: Blayne Zabala MD;  Location: [facility] HYBRID OR;  Service: Vascular;  Laterality: Left;    ARTERY SURGERY  2021    right side on neck    CAROTID ENDARTERECTOMY Right 05/10/2021    Procedure: RIGHT CAROTID ENDARTERECTOMY WITH EEG;  Surgeon: Gil Pineda DO;  Location: [facility] HYBRID OR 12;  Service: Vascular;  Laterality: Right;    COLONOSCOPY N/A 07/02/2020    Procedure: COLONOSCOPY WITH ANESTHESIA;  Surgeon: Adrien Brewster MD;  Location: [facility] ENDOSCOPY;  Service: Gastroenterology;  Laterality: N/A;  pre op: diarrhea  post op: polyps  PCP: Joe Velasco MD    COLONOSCOPY N/A 10/13/2020    Procedure: COLONOSCOPY WITH ANESTHESIA;  Surgeon: Adrien Brewster MD;  Location: [facility] ENDOSCOPY;  Service: Gastroenterology;  Laterality: N/A;  Pre: Chronic Diarrhea, Crohn's  Post: AVM  Dr. Neftali Velasco  CO2 Inflation Used    COLONOSCOPY N/A 12/08/2023    Procedure: COLONOSCOPY WITH ANESTHESIA;  Surgeon: Adrien Brewster MD;  Location: [facility] ENDOSCOPY;  Service: Gastroenterology;  Laterality: N/A;  pre chrone's disease  post sub optimal prep, polyp, chrone's      CORONARY ARTERY BYPASS GRAFT  2003    x3    ENDOSCOPY N/A 11/02/2021    Procedure: ESOPHAGOGASTRODUODENOSCOPY WITH ANESTHESIA;  Surgeon: Ray  Bridger YUN MD;  Location: [facility] ENDOSCOPY;  Service: Gastroenterology;  Laterality: N/A;  pre anemia;gi bleed  post  gi bleed;fabian Velasco    ENDOSCOPY N/A 10/10/2023    Procedure: ESOPHAGOGASTRODUODENOSCOPY WITH ANESTHESIA;  Surgeon: Adrien Brewster MD;  Location: [facility] ENDOSCOPY;  Service: Gastroenterology;  Laterality: N/A;  preop; anemia  postop esophagitis ; R/O barretts   PCP Randall Beata    EYE SURGERY Bilateral     catorac    INCISION AND DRAINAGE PERIRECTAL ABSCESS N/A 2017    Procedure: INCISION AND DRAINAGE OF JEET ANAL ABSCESS;  Surgeon: Lynette Smith MD;  Location: [facility] OR;  Service:     INGUINAL HERNIA REPAIR Bilateral 2023    Procedure: INGUINAL HERNIA BILATERAL REPAIR LAPAROSCOPIC WITH DAVINCI ROBOT WITH MESH;  Surgeon: Tahira Rivera MD;  Location: [facility] OR;  Service: Robotics - DaVinci;  Laterality: Bilateral;    INSERTION HEMODIALYSIS CATHETER N/A 2025    Procedure: HEMODIALYSIS CATHETER PLACEMENT;  Surgeon: Gil Pineda DO;  Location: [facility] HYBRID OR;  Service: Vascular;  Laterality: N/A;    MYRINGOTOMY W/ TUBES Left 2017    06/10/2016    TONSILLECTOMY      TOTAL HIP ARTHROPLASTY Right        Family History  Family History   Problem Relation Age of Onset    Breast cancer Mother     Dementia Father     Glaucoma Father     No Known Problems Daughter     Colon polyps Neg Hx     Colon cancer Neg Hx        Social History  Social History     Socioeconomic History    Marital status:    Tobacco Use    Smoking status: Former     Current packs/day: 0.00     Average packs/day: 0.5 packs/day for 25.8 years (12.9 ttl pk-yrs)     Types: Cigarettes     Start date:      Quit date: 10/13/2013     Years since quittin.6     Passive exposure: Past    Smokeless tobacco: Never    Tobacco comments:     quit 2013   Vaping Use    Vaping status: Former    Substances: Nicotine    Devices: Pre-filled or refillable cartridge   Substance and  Sexual Activity    Alcohol use: Not Currently    Drug use: No    Sexual activity: Not Currently     Partners: Female       Review of Systems:  History obtained from chart review and the patient  General ROS: No fever or chills  Respiratory ROS: No cough, shortness of breath, wheezing  Cardiovascular ROS: No chest pain or palpitations  Gastrointestinal ROS: No abdominal pain or melena  Genito-Urinary ROS: No dysuria or hematuria  Psych ROS: No anxiety and depression  14 point ROS reviewed with the patient and negative except as noted above and in the HPI unless unable to obtain.    Objective:  Patient Vitals for the past 24 hrs:   BP Temp Temp src Pulse Resp SpO2   05/26/25 0739 -- -- -- 59 16 95 %   05/26/25 0723 (!) 128/33 98.3 °F (36.8 °C) Oral 60 16 95 %   05/26/25 0500 119/40 98.8 °F (37.1 °C) Oral 59 16 98 %   05/26/25 0009 127/40 98.9 °F (37.2 °C) Oral 57 16 97 %   05/25/25 1956 113/40 98.5 °F (36.9 °C) Oral 58 16 96 %   05/25/25 1807 -- -- -- 56 16 95 %   05/25/25 1802 -- -- -- 50 16 94 %       Intake/Output Summary (Last 24 hours) at 5/26/2025 1234  Last data filed at 5/26/2025 0900  Gross per 24 hour   Intake 750 ml   Output 175 ml   Net 575 ml     General: awake/alert   Chest:  clear to auscultation bilaterally without respiratory distress  CVS: regular rate and rhythm  Abdominal: soft, nontender, positive bowel sounds  Extremities: no cyanosis or edema  Skin: warm and dry without rash      Labs:  Results from last 7 days   Lab Units 05/26/25  0309 05/25/25  0457 05/24/25  0214   WBC 10*3/mm3 6.58 8.92 6.12   HEMOGLOBIN g/dL 8.0* 8.5* 6.7*   HEMATOCRIT % 24.8* 26.3* 21.0*   PLATELETS 10*3/mm3 85* 113* 86*         Results from last 7 days   Lab Units 05/26/25  0309 05/25/25  0457 05/24/25  0214 05/23/25  0303 05/22/25  1624 05/21/25  1632   SODIUM mmol/L 136 139 139 139   < > 137   POTASSIUM mmol/L 4.0 3.3* 3.0* 3.2*   < > 3.1*   CHLORIDE mmol/L 97* 96* 98 99   < > 95*   CO2 mmol/L 26.0 26.0 30.0* 25.0   <  "> 30.0*   BUN mg/dL 62* 43* 27* 35*   < > 14   CREATININE mg/dL 4.45* 3.75* 2.99* 3.80*   < > 2.29*   CALCIUM mg/dL 8.8 9.3 8.2* 9.2   < > 8.5*   EGFR mL/min/1.73 12.9* 15.9* 20.8* 15.6*   < > 28.7*   BILIRUBIN mg/dL  --  0.4  --  0.2  --  0.2   ALK PHOS U/L  --  102  --  116  --  110   ALT (SGPT) U/L  --  <5  --  5  --  5   AST (SGOT) U/L  --  20  --  20  --  18   GLUCOSE mg/dL 97 138* 106* 105*   < > 99    < > = values in this interval not displayed.       Radiology:   Imaging Results (Last 72 Hours)       ** No results found for the last 72 hours. **            Culture:  No results found for: \"BLOODCX\", \"URINECX\", \"WOUNDCX\", \"MRSACX\", \"RESPCX\", \"STOOLCX\"      Assessment   End-stage renal disease  Hypertension  Anemia and chronic kidney disease  Peripheral vascular disease  Thrombocytopenia  Hypokalemia     Plan:  Discussed with patient, nursing  Workup reviewed today  Monitor labs  Dialysis Monday Wednesday Friday per routine scheduling  Vascular evaluation reviewed as current, noted plans for AKA sometime later next week  Adjust blood pressure medications as needed postdialysis      Twan Quiroz MD  5/26/2025  12:34 CDT      "

## 2025-05-26 NOTE — PLAN OF CARE
Goal Outcome Evaluation:           Progress: no change  Outcome Evaluation: had HD today, 2L off.  PRN meds given for pain. Voiding per urinal. Call light in reach

## 2025-05-26 NOTE — CONSULTS
Adult Nutrition  Assessment/PES    Patient Name:  Ricardo Hugo  YOB: 1950  MRN: [id number]  Admit Date:  5/21/2025    Assessment Date:  5/26/2025   Reason for Assessment       Row Name 05/26/25 1100          Reason for Assessment    Reason For Assessment physician consult     Diagnosis cardiac disease;renal disease     Identified At Risk by Screening Criteria no indicators present               Nutrition/Diet History       Row Name 05/26/25 1101          Nutrition/Diet History    Typical Intake (Food/Fluid/EN/PN) Pt in room with family. Consult requested for DM. Last recorded A1C on 5/25/25 was 4.8. Pt and family deny any history of DM. Blood glucose over the past 72 hours has ranged from . Pt eating well, with an average intake of ~85% and PO fluid intake averaging 555 mL/day.  Pt undergoing HD three times per week. Weight loss is difficult to assess due to fluid shifts, with documented weight fluctuations. Pt appears to have moderate to severe muscle and fat wasting; however, NPFE could not be performed during this visit. Appetite noted as good. Primary complaint is left leg pain, with possible left AKA pending.  Dietitian reviewed calorie and protein needs and encouraged pt to maintain current intake. Pt reports drinking ONS at home, which he will continue while inpatient for additional nutrition ahead of surgery.  Will continue to monitor.     Food Intolerance(s) NKFA     Factors Affecting Nutritional Intake pain               Labs/Tests/Procedures/Meds       Row Name 05/26/25 1108          Labs/Procedures/Meds    Lab Results Reviewed reviewed     Lab Results Comments BUN 62, Triglycerides 164, Creat 4.45        Diagnostic Tests/Procedures    Diagnostic Test/Procedure Reviewed reviewed        Medications    Pertinent Medications Reviewed reviewed     Pertinent Medications Comments See MAR               Physical Findings       Row Name 05/26/25 1108          Physical Findings    Overall  Physical Appearance Carlos Eduardo score 19, room air, limited dentation, weakness               Estimated/Assessed Needs - Anthropometrics       Row Name 05/26/25 1111          Anthropometrics    Calculation Weight 65.3 kg (143 lb 15.4 oz)        Estimated/Assessed Needs    Additional Documentation KCAL/KG (Group);Protein Requirements (Group);Fluid Requirements (Group)        KCAL/KG    KCAL/KG 30 Kcal/Kg (kcal);25 Kcal/Kg (kcal)     25 Kcal/Kg (kcal) 1632.5     30 Kcal/Kg (kcal) 1959        Protein Requirements    Weight Used For Protein Calculations 65.3 kg (143 lb 15.4 oz)     Est Protein Requirement Amount (gms/kg) 1.2 gm protein     Estimated Protein Requirements (gms/day) 78.36        Fluid Requirements    Fluid Requirements (mL/day) 1959               Nutrition Prescription Ordered       Row Name 05/26/25 1112          Nutrition Prescription PO    Fluid Consistency Thin     Common Modifiers Consistent Carbohydrate               Evaluation of Received Nutrient/Fluid Intake       Row Name 05/26/25 1113          Nutrient/Fluid Evaluation    Number of Days Evaluated 3 days        Fluid Intake Evaluation    Oral Fluid (mL) 555        PO Evaluation    Number of Meals 5     % PO Intake 85              Problem/Interventions:   Problem 1       Row Name 05/26/25 1113          Nutrition Diagnoses Problem 1    Problem 1 No Nutrition Diagnosis at this Time               Intervention Goal       Row Name 05/26/25 1113          Intervention Goal    General Meet nutritional needs for age/condition;Reduce/improve symptoms;Disease management/therapy     PO Maintain intake;Meet estimated needs     Weight No significant weight loss  Possible AKA this week               Nutrition Intervention       Row Name 05/26/25 1114          Nutrition Intervention    RD/Tech Action Care plan reviewd;Follow Tx progress;Recommend/ordered;Encourage intake;Advise available snack;Interview for preference     Recommended/Ordered Supplement                Nutrition Prescription       Row Name 05/26/25 1114          Nutrition Prescription PO    PO Prescription Begin/change supplement     Supplement Boost;Other (comment)  Fortified pudding     Supplement Frequency 2 times a day                    Education/Evaluation       Row Name 05/26/25 1114          Education    Education No discharge needs identified at this time        Monitor/Evaluation    Monitor Per protocol              Electronically signed by:  Usha Blake RDN, SAULO  05/26/25 11:16 CDT

## 2025-05-26 NOTE — PROGRESS NOTES
[facility] Medicine Services  INPATIENT PROGRESS NOTE    Patient Name: Ricardo Hugo  Date of Admission: 5/21/2025  Today's Date: 05/26/25  Length of Stay: 5  Primary Care Physician: Nathan Zimmer MD    Subjective   Chief Complaint: Peripheral vascular disease/pain/dialysis/anemia/thrombocytopenia   HPI   Blood pressure stable, afebrile.  Decrease Procardia . On room air.  Patient currently on dialysis, plan for 2 L removed today.  Creatinine increased.  Hemoglobin stable.  Platelet counts decreased.  White blood cells  normal.    Review of Systems   Constitutional:  Positive for activity change, appetite change and fatigue. Negative for chills and fever.   HENT:  Negative for hearing loss, nosebleeds, tinnitus and trouble swallowing.    Eyes:  Negative for visual disturbance.   Respiratory:  Negative for cough, chest tightness, shortness of breath and wheezing.    Cardiovascular:  Negative for chest pain, palpitations and leg swelling.   Gastrointestinal:  Negative for abdominal distention, abdominal pain, blood in stool, constipation, diarrhea, nausea and vomiting.   Endocrine: Negative for cold intolerance, heat intolerance, polydipsia, polyphagia and polyuria.   Genitourinary:  Negative for decreased urine volume, difficulty urinating, dysuria, flank pain, frequency and hematuria.   Musculoskeletal:  Positive for arthralgias, gait problem and myalgias. Negative for joint swelling.   Skin:  Negative for rash.   Allergic/Immunologic: Negative for immunocompromised state.   Neurological:  Positive for weakness. Negative for dizziness, syncope, light-headedness and headaches.   Hematological:  Negative for adenopathy. Does not bruise/bleed easily.   Psychiatric/Behavioral:  Negative for confusion and sleep disturbance. The patient is not nervous/anxious.    All pertinent negatives and positives are as above. All other systems have been reviewed and are negative unless  otherwise stated.     Objective    Temp:  [98.3 °F (36.8 °C)-98.9 °F (37.2 °C)] 98.3 °F (36.8 °C)  Heart Rate:  [50-60] 59  Resp:  [16] 16  BP: (113-128)/(33-40) 128/33  Physical Exam  Vitals and nursing note reviewed.   Constitutional:       Comments: Advanced age.  Cachectic.  Chronically ill.   HENT:      Head: Normocephalic.   Eyes:      Conjunctiva/sclera: Conjunctivae normal.      Pupils: Pupils are equal, round, and reactive to light.   Cardiovascular:      Rate and Rhythm: Normal rate and regular rhythm.      Heart sounds: Normal heart sounds.   Pulmonary:      Effort: No respiratory distress.      Comments: Diminished breath sound bilateral, clear, on room air.  Abdominal:      General: Bowel sounds are normal. There is no distension.      Palpations: Abdomen is soft.      Tenderness: There is no abdominal tenderness.   Musculoskeletal:         General: No swelling.      Cervical back: Neck supple.   Skin:     General: Skin is warm and dry.      Findings: No rash.   Neurological:      Mental Status: He is alert and oriented to person, place, and time.      Motor: Weakness present.      Coordination: Coordination abnormal.      Gait: Gait abnormal.   Psychiatric:         Mood and Affect: Mood normal.         Behavior: Behavior normal.         Thought Content: Thought content normal.          Results Review:  I have reviewed the labs, radiology results, and diagnostic studies.    Laboratory Data:   Results from last 7 days   Lab Units 05/26/25  0309 05/25/25  0457 05/24/25  0214   WBC 10*3/mm3 6.58 8.92 6.12   HEMOGLOBIN g/dL 8.0* 8.5* 6.7*   HEMATOCRIT % 24.8* 26.3* 21.0*   PLATELETS 10*3/mm3 85* 113* 86*        Results from last 7 days   Lab Units 05/26/25  0309 05/25/25  0457 05/24/25  0214 05/23/25  0303 05/22/25  1624 05/21/25  1632   SODIUM mmol/L 136 139 139 139   < > 137   POTASSIUM mmol/L 4.0 3.3* 3.0* 3.2*   < > 3.1*   CHLORIDE mmol/L 97* 96* 98 99   < > 95*   CO2 mmol/L 26.0 26.0 30.0* 25.0   < >  "30.0*   BUN mg/dL 62* 43* 27* 35*   < > 14   CREATININE mg/dL 4.45* 3.75* 2.99* 3.80*   < > 2.29*   CALCIUM mg/dL 8.8 9.3 8.2* 9.2   < > 8.5*   BILIRUBIN mg/dL  --  0.4  --  0.2  --  0.2   ALK PHOS U/L  --  102  --  116  --  110   ALT (SGPT) U/L  --  <5  --  5  --  5   AST (SGOT) U/L  --  20  --  20  --  18   GLUCOSE mg/dL 97 138* 106* 105*   < > 99    < > = values in this interval not displayed.       Culture Data:   No results found for: \"BLOODCX\", \"URINECX\", \"WOUNDCX\", \"MRSACX\", \"RESPCX\", \"STOOLCX\"    Radiology Data:   Imaging Results (Last 24 Hours)       ** No results found for the last 24 hours. **            I have reviewed the patient's current medications.     Assessment/Plan   Assessment  Active Hospital Problems    Diagnosis     **Arterial occlusion     Thrombocytopenia     Acute pain of left lower extremity     ESRD (end stage renal disease) on dialysis     Abnormal TSH     Chronic diastolic (congestive) heart failure     Anticoagulated     Sensorineural hearing loss (SNHL), bilateral     Eustachian tube dysfunction, bilateral     Nasal septal deviation     Mixed hyperlipidemia     CLL (chronic lymphocytic leukemia)     Anemia due to chronic kidney disease     Diastolic dysfunction     Symptomatic anemia     Resistant hypertension     Peripheral arterial disease     IHD (ischemic heart disease)     Essential hypertension     Chronic rhinitis        Treatment Plan  Acute ischemic left lower extremity /left leg pain.  Vascular consult.  Status post surgery 5/22/2025-Right common femoral artery access under ultrasound guidance, left posterior tibial artery access ultrasound guidance, Left lower extremity arteriogram with third order selection angiographic interpretation, PTA of the left SFA with a 3 x 60 Tammy cross balloon, Shockwave lithotripsy of the left external iliac artery with a 6 x 60 shockwave balloon, Left external iliac artery stenting with a 8 x 60 ever flex stent postdilated with a 7 x 60 Ever " Cross balloon  Vascular-plan for left above-the-knee amputation next week.     End-stage renal disease.  Dialysis patient.  Calcitriol . Creatinine increasing..     Hypokalemia.  Resolved. Magnesium-normal.     Anemia. Status post 1 unit blood transfusion 5/24/25.  Niferex . Procrit.     Thrombocytopenia.  Platelet decreased.     CAD/hypertension/hyperlipidemia/CHF.  Aspirin . Lipitor.  Coreg . Catapres.  Plavix.  Decrease Procardia.  Hydralazine . Isordil . Aldactone.  Diovan . hydralazine as needed.  Nitro as needed.  Echocardiogram 1/11/2025- 56 - 60%,  mild septal asymmetric hypertrophy, diastolic function is consistent with (grade II w/high LAP) pseudonormalization, Normal right ventricular cavity size and systolic function, left atrial cavity is mild to moderately dilated, right atrial cavity is dilated, Mild aortic valve stenosis, Mild to moderate mitral valve regurgitation, Moderate to severe tricuspid valve regurgitation,  tricuspid regurgitation is markedly elevated (>55 mmHg).     COPD.  Not exacerbation.  DuoNebs.  Singulair.  Hypothyroidism.  Synthroid.  Incentive spirometer.  Patient is on room air.     Reflux . Protonix.  Compazine as needed.  Phenergan as needed.     Prostate hypertrophy.  Flomax.     Anxiety/depression.  Xanax as needed.     Pain.  Morphine as needed.  Percocet as needed.  Neurontin at Night.     Nutrition . Diabetic diet.  Nutrition consult.     Deconditioning.  PT consult.     Medical Decision Making  Number and Complexity of problems: Ischemic left leg/end-stage renal disease/hypokalemia/anemia/thrombocytopenia/CAD/CHF/COPD  Differential Diagnosis: None     Conditions and Status        Condition is unchanged.     [facility] Data  External documents reviewed: Previous note .  Cardiac tracing (EKG, telemetry) interpretation: No monitor  Radiology interpretation: Echo  Labs reviewed: Laboratory  Any tests that were considered but not ordered: Lab in a.m.     Decision rules/scores evaluated  (example [id number], Wells, etc): None     Discussed with: Patient and daughter     Care Planning  Shared decision making: Patient and daughter  Code status and discussions: Full code     Disposition  Social Determinants of Health that impact treatment or disposition: From home  Patient probably need rehab placement.  2 to 5 days.         Electronically signed by Garrett Comer MD, 05/26/25, 14:01 CDT.

## 2025-05-26 NOTE — CASE MANAGEMENT/SOCIAL WORK
Discharge Planning Assessment  [facility]     Patient Name: Ricardo Hugo  MRN: [id number]  Today's Date: 5/26/2025    Admit Date: 5/21/2025        Discharge Needs Assessment       Row Name 05/26/25 1247       Living Environment    People in Home alone    Current Living Arrangements home    Potentially Unsafe Housing Conditions none    Primary Care Provided by self    Provides Primary Care For no one    Family Caregiver if Needed child(jamshid), adult    Family Caregiver Names Kathleen    Quality of Family Relationships helpful;involved;supportive    Able to Return to Prior Arrangements yes       Resource/Environmental Concerns    Resource/Environmental Concerns none       Transition Planning    Patient/Family Anticipates Transition to home    Patient/Family Anticipated Services at Transition none    Transportation Anticipated family or friend will provide       Discharge Needs Assessment    Readmission Within the Last 30 Days previous discharge plan unsuccessful    Current Outpatient/Agency/Support Group outpatient hemodialysis    Concerns to be Addressed adjustment to diagnosis/illness    Anticipated Changes Related to Illness none    Equipment Needed After Discharge none    Discharge Coordination/Progress Pt lives at home alone and will likely return home at d/c. He has rx coverage for his meds. Pt has outpatient dialysis set up already 3x weekly at the [facility] and will resume at d/c. Will follow in case d/c needs arise.                   Discharge Plan    No documentation.                      Demographic Summary    No documentation.                  Functional Status    No documentation.                  Psychosocial    No documentation.                  Abuse/Neglect    No documentation.                  Legal    No documentation.                  Substance Abuse    No documentation.                  Patient Forms    No documentation.                     LINNETTE Ramos

## 2025-05-26 NOTE — NURSING NOTE
[facility] [place]  DIALYSIS TREATMENT SUMMARY     Note: Consult with the attending physician for patient treatment orders, this document is not a physician order.     Patient Information  Patient Ricardo Hugo  Date of Birth February 22, 1950  Chart Number [id number]  Location [facility]  Location MRN [id number]  Gender Male  SSN (last 4)   Treatment Information  Treatment Type Hemodialysis  Treatment Id [id number]  Start Time May 26, 2025 10:17  End Time May 26, 2025 13:51  Acutal Duration 03:34  Treatment Balances  Total Saline Administered 500  Net Fluid Balance -2000  Hemodialysis Orders  Therapy Standard  Orders Verified Time 05/26/2025 10:00   Date Verified 05/26/2025  Duration 3:30  Isolated UF/Bypass No  BFR (mL) 450  DFR (mL) 700  Dialyzer Type OPTIFLUX 160NR  UF Order UF Range  UF Range (mL) 1000 - 2000  With BP Parameters Yes  As Tolerated Yes  Crit-Line used No  Heparin Initial Units Bolus No  Heparin IV Maintenance Bolus No  Heparin IV Infusion No  Potassium (mEq/L) 3  Calcium (mEq/L) 2.5  Bicarb (mg/dL) 35  Sodium (mEq/L) 137  Clinician Bita Minor RN  Dialysis Access  Access Type Central Venous Catheter  Central Venous Catheter  Access Type Catheter - Tunneled  Access Location Chest Wall - R  Current/New [facility] Patient Yes  1st Use Catheter Verified by Previous Use  Catheter Care Completed per Policy Yes  Dressing Dry and Intact on Arrival Yes  Dressing Changed No  Type of Dressing Film Biopatch/CHG  Last Date Changed 05/22/2025  If No select Reason Dressing Change Not Indicated per Policy  Type of HD Caps Curos  HD Caps Changed Yes  CVC Line Education Provided Yes - Dressing Change Frequency  Vitals  Pre-Treatment Vitals  Time Is BP being recorded? Pre BP Map BP Method Pulse RR Temp How was Weight Obtained? Pre Weight Previous Dry Weight Previous Post Weight Metric Target Fluid Removal (mL) Dialysate Confirmed Clinician  05/26/2025  10:08 BP/Map 132/55 (81) Noninvasive 62 16 98.2 How Obtained: Reported Floor Weight 65.3   Kgs 2500 Yes Bita Minor RN  Comments:   Intra Procedure Vitals  Rec Type Time Is BP being recorded? BP Map Pulse RR BFR DFR AP  TMP UFR RMVD Bolus NSS Flush Chills Safety Check Crit-Line HCT Crit-Line BV% Clinician  E 05/26/2025 10:17 BP/Map 140/51 (81) 62 16 450 700 -150 110 60 690 0    Yes   Bita Minor RN  Comments: HD initiated via right chest permcath.  E 05/26/2025 10:30 BP/Map 147/76 (100) 62 16 450 700 -160 120 60 690 240    Yes   Bita Minor RN  Comments: Pt on laptop. No complaints.  E 05/26/2025 11:00 BP/Map 162/54 (90) 65 16 450 700 -180 130 50 690 657    Yes   Bita Minor RN  Comments: Pt c/o severe left leg pain. Floor RN notified.  E 05/26/2025 11:16 BP/Map                  Bita Minor RN  Comments: Morphine IV given per floor RN.  E 05/26/2025 11:30 BP/Map 151/52 (85) 58 16 450 700 -180 130 50 690 936    Yes   Bita Minor RN  Comments: Pt says his pain is much better now.  E 05/26/2025 12:00 BP/Map 162/49 (87) 62 16 450 700 -180 130 50  1249    Yes   Bita Minor RN  Comments:   E 05/26/2025 12:10 BP/Map               Yes   Bita Minor RN  Comments: Dr. Quiroz at the bedside.  E 05/26/2025 12:30 BP/Map 170/50 (90) 67 16 450 700 -190 130 50 690 1621    Yes   Bita Minor RN  Comments:   E 05/26/2025 13:00 BP/Map 136/42 (73) 54 16 450 700 -190 130 50 690 1988    Yes   Bita Minor RN  Comments: No acute changes.  E 05/26/2025 13:30 BP/Map 147/41 (76) 61 16 450 700 -190 130 40 690 2328    Yes   Bita Minor RN  Comments: Pt resting with eyes closed.  E 05/26/2025 13:51 BP/Map 146/42 (77) 61 16       2500    Yes   Bita Minor, DEMETRIUS  Comments: HD completed.  Post Treatment Vitals  Time Is BP being recorded? BP Map Pulse RR Temp How was Weight Obtained? Post Weight Metric BVP UF Goal Ordered NSS Given Intra-Procedure Total Machine UF Removed (mL) Crit-Line Ending Profile Crit-Line  Refill Crit-Line Ending HCT Crit-Line Max BV% Clinician  05/26/2025 14:06 BP/Map 159/42 (81) 62 16 98.1 How Obtained: Reported Floor Weight 63.3 Kgs 89.5 2000 0 2500     Bita Minor RN  Comments:   Safety checks include: access uncovered and secured, Hemaclip secured for all central line access, machine checks performed, and alarm limits confirmed.  Labs  Hepatitis  HBsAG Lab Result HBsAG Lab Result HBsAG Draw Date Transient Antigenemia(MD Diagnosis Only) Anti-HBs Lab Result Anti-HBs Lab Value Anti-HBs Draw Date Documented By Documented Date Hepatitis Status Hepatitis Status  Negative  05/05/2025  Negative  04/30/2025 Bita Minor 05/26/2025  Susceptible  Notes:   Pre-Treatment Hepatitis Precautions Labs Drawn Yes Signing  Patient tx with immune pts only Yes Hepatitis Information Entered By Bita Minor RN Signed By Bita Minor RN  Pre-Treatment Handoff  Staff Report Received Yes  Report Given by Primary Nurse Crystal Lopez  Time 09:18  Patient Arrival  Patient ID Verified Date of Birth  MRN  Full Name  Patient Consent to treatment verified Yes  Blood Transfusion Consent Verified N/A  Treatment Comments  Treatment Notes Pt tolerated treatment well.  Post-Treatment Handoff  Report Given to Primary Nurse Crystal Lopez  Time Report Given 14:12  Report Given By Bita Minor RN  Machine Validation  Time 09:35  Date 5/26/2025  Auto Alarm Test Passed Yes  Machine Serial # [id number]  Portable RO Yes  RO Serial# 14  Residual Bleach Negative Yes  Was a manufactured mix used? Yes  Machine Log Completed Yes  Total Chlorine (less than 0.1)? Yes  Total Chlorine Log Completed Yes  Bicarb BiBag  Bibag Size 900  Machine Temp 36  Machine Conductivity 13.8  Meter Type N/A  Meter Conductivity   Independent Conductivity 13.8  pH Status Pass Pass  pH   TCD Value 13.7  TCD Alarm with +/- 0.5 Yes  NVL enabled validated 100 asymmetric Yes  Safety check complete Bita Minor RN  Second Verification Performed? Yes  Second  Verification Performed By Bita Minor RN  Reason Not Verified   Serum Lab Values  Time BUN Creatinine Na K (mEq/L) Cl CO2 Ca (mEq/L) Phos Mg (mg/dL) Alb (g/dL) Glucose Hgb Hct WBC Plt PT aPTT INR Other Clinician  05/26/2025 03:09 62 4.4 136 4 97 26 8.8    97 8 24.8 6.5 85     Bita Minor RN  Comments:   Facility Information Room # 389 Diagnosis Anemia, multifactorial to include chronic kidney disease, iron deficiency and possible bleed  Facility Information Bed # 3 Isolation Information  Admission Date 05/21/2025 Patient Type New patient to dialysis with diagnosis of ESRD Isolation Required? N  Ordering MD Quiroz Patient Chronic Unit [facility] Completed by  Account/Finance Number [id number] Patient Home Unit [facility] information Entered by Bita Minor RN  Admission Status InPatient Code Status Full Code   Location Dialysis Suite Multi Diagnosis   Start Treatment Time Out Confirmed by GIGI Minor RN Correct access site verified Yes  Treatment Initiation Connections Secured Time Out Completed 10:05 Treatment Start Date 05/26/2025  Saline line double clamped Correct patient verified Yes Treatment Start Time 10:17  Hemaclip Applied Correct procedure verified Yes Patient/Family questions and concerns addressed Yes  Pre Focused Assessment Position Anterior LOC Alert and Oriented x3  Access Respiratory Efforts Unlabored GI / Bowels  Signs and Symptoms of Infection? No Edema GI Soft  Pain Screening Location Generalized   Does the patient have pain? Yes Cardiac  [facility] Nurse Notified  Heart Rhythm Regular Completed by  Notes Pain med recently given. Pt says he is fine right now. Telemetry Yes Pre Treatment Focused Assessment Completed By Bita Minor RN  Respiratory Skin Time 10:06  Lung Sounds Diminished Skin Warm Signing  Location Bilateral LOC Signed By Bita Minor RN  Education Focus or Topic Infection Prevention Caregiver Education Provided? N/A  Patient Education Method Knowledge /  Understanding Assessed Teach back Patient Education Reinforced By  Patient Educated? Yes Family Education Provided? N/A Patient Education Reinforced by Bita Minor RN  Post-Treatment Machine Disinfection Requirement Notes UF goal obtained.  Post Treatment Delay HD machine external disinfection completed per policy Yes Completed by  Delay Y PRO external disinfection completed per policy Yes Post Treatment Form Completed By Bita Minor RN  Delay Type Delay Due to Hospital Transportation Post Treatment General Information   Delay Hours 0.75 Care Transition Document Completed Yes   Post Focused Assessment Lung Sounds Diminished LOC Alert and Oriented x3  Changes from Pre Focused Assessment Location Bilateral GI / Bowels  Changes from Pre Treatment Focused Assessment? Yes Position Anterior GI Soft  Access Respiratory Efforts Unlabored   Cath Packed with Heparin Edema  Voids  Access Port(ml) 1.6 Location Generalized Completed by  Return Port(ml) 1.6 Cardiac Post Treatment Focused Assessment Completed by Bita Minor RN  Catheter clamped and capped Yes Heart Rhythm Regular Date 05/26/2025  Access Flow Good Telemetry Yes Time 14:08  Pain Screening Skin Signing  Does the patient have pain? No Skin Warm Signed By Bita Minor RN  Respiratory LOC

## 2025-05-27 ENCOUNTER — TELEPHONE (OUTPATIENT)
Dept: VASCULAR SURGERY | Facility: CLINIC | Age: 75
End: 2025-05-27
Payer: MEDICARE

## 2025-05-27 LAB
ANION GAP SERPL CALCULATED.3IONS-SCNC: 12 MMOL/L (ref 5–15)
BUN SERPL-MCNC: 31 MG/DL (ref 8–23)
BUN/CREAT SERPL: 10.7 (ref 7–25)
CALCIUM SPEC-SCNC: 8.8 MG/DL (ref 8.6–10.5)
CHLORIDE SERPL-SCNC: 98 MMOL/L (ref 98–107)
CO2 SERPL-SCNC: 29 MMOL/L (ref 22–29)
CREAT SERPL-MCNC: 2.89 MG/DL (ref 0.76–1.27)
DEPRECATED RDW RBC AUTO: 65.6 FL (ref 37–54)
EGFRCR SERPLBLD CKD-EPI 2021: 21.7 ML/MIN/1.73
ERYTHROCYTE [DISTWIDTH] IN BLOOD BY AUTOMATED COUNT: 18 % (ref 12.3–15.4)
GLUCOSE SERPL-MCNC: 106 MG/DL (ref 65–99)
HCT VFR BLD AUTO: 24.9 % (ref 37.5–51)
HGB BLD-MCNC: 7.9 G/DL (ref 13–17.7)
MCH RBC QN AUTO: 31.9 PG (ref 26.6–33)
MCHC RBC AUTO-ENTMCNC: 31.7 G/DL (ref 31.5–35.7)
MCV RBC AUTO: 100.4 FL (ref 79–97)
PLATELET # BLD AUTO: 113 10*3/MM3 (ref 140–450)
PMV BLD AUTO: 10.6 FL (ref 6–12)
POTASSIUM SERPL-SCNC: 3.8 MMOL/L (ref 3.5–5.2)
RBC # BLD AUTO: 2.48 10*6/MM3 (ref 4.14–5.8)
SODIUM SERPL-SCNC: 139 MMOL/L (ref 136–145)
WBC NRBC COR # BLD AUTO: 6.87 10*3/MM3 (ref 3.4–10.8)

## 2025-05-27 PROCEDURE — 94799 UNLISTED PULMONARY SVC/PX: CPT

## 2025-05-27 PROCEDURE — 85027 COMPLETE CBC AUTOMATED: CPT | Performed by: FAMILY MEDICINE

## 2025-05-27 PROCEDURE — 25010000002 MORPHINE PER 10 MG: Performed by: FAMILY MEDICINE

## 2025-05-27 PROCEDURE — 99232 SBSQ HOSP IP/OBS MODERATE 35: CPT | Performed by: NURSE PRACTITIONER

## 2025-05-27 PROCEDURE — 94664 DEMO&/EVAL PT USE INHALER: CPT

## 2025-05-27 PROCEDURE — 80048 BASIC METABOLIC PNL TOTAL CA: CPT | Performed by: FAMILY MEDICINE

## 2025-05-27 RX ORDER — MONTELUKAST SODIUM 10 MG/1
10 TABLET ORAL NIGHTLY
Qty: 90 TABLET | Refills: 3 | Status: SHIPPED | OUTPATIENT
Start: 2025-05-27

## 2025-05-27 RX ADMIN — ATORVASTATIN CALCIUM 10 MG: 10 TABLET, FILM COATED ORAL at 07:58

## 2025-05-27 RX ADMIN — Medication 10 ML: at 22:46

## 2025-05-27 RX ADMIN — ASPIRIN 81 MG: 81 TABLET, COATED ORAL at 07:56

## 2025-05-27 RX ADMIN — GABAPENTIN 300 MG: 300 CAPSULE ORAL at 19:58

## 2025-05-27 RX ADMIN — IPRATROPIUM BROMIDE AND ALBUTEROL SULFATE 3 ML: .5; 3 SOLUTION RESPIRATORY (INHALATION) at 14:40

## 2025-05-27 RX ADMIN — LEVOTHYROXINE SODIUM 100 MCG: 0.1 TABLET ORAL at 05:01

## 2025-05-27 RX ADMIN — VALSARTAN 320 MG: 80 TABLET, FILM COATED ORAL at 07:56

## 2025-05-27 RX ADMIN — MORPHINE SULFATE 1 MG: 2 INJECTION, SOLUTION INTRAMUSCULAR; INTRAVENOUS at 18:45

## 2025-05-27 RX ADMIN — Medication 10 ML: at 08:00

## 2025-05-27 RX ADMIN — TAMSULOSIN HYDROCHLORIDE 0.4 MG: 0.4 CAPSULE ORAL at 19:58

## 2025-05-27 RX ADMIN — MORPHINE SULFATE 1 MG: 2 INJECTION, SOLUTION INTRAMUSCULAR; INTRAVENOUS at 01:20

## 2025-05-27 RX ADMIN — OXYCODONE HYDROCHLORIDE AND ACETAMINOPHEN 1 TABLET: 5; 325 TABLET ORAL at 07:56

## 2025-05-27 RX ADMIN — IPRATROPIUM BROMIDE AND ALBUTEROL SULFATE 3 ML: .5; 3 SOLUTION RESPIRATORY (INHALATION) at 18:32

## 2025-05-27 RX ADMIN — IPRATROPIUM BROMIDE AND ALBUTEROL SULFATE 3 ML: .5; 3 SOLUTION RESPIRATORY (INHALATION) at 11:01

## 2025-05-27 RX ADMIN — CLOPIDOGREL BISULFATE 75 MG: 75 TABLET, FILM COATED ORAL at 07:59

## 2025-05-27 RX ADMIN — MORPHINE SULFATE 1 MG: 2 INJECTION, SOLUTION INTRAMUSCULAR; INTRAVENOUS at 14:05

## 2025-05-27 RX ADMIN — OXYCODONE HYDROCHLORIDE AND ACETAMINOPHEN 1 TABLET: 5; 325 TABLET ORAL at 19:58

## 2025-05-27 RX ADMIN — MONTELUKAST SODIUM 10 MG: 10 TABLET, COATED ORAL at 19:58

## 2025-05-27 RX ADMIN — OXYCODONE HYDROCHLORIDE AND ACETAMINOPHEN 1 TABLET: 5; 325 TABLET ORAL at 03:26

## 2025-05-27 RX ADMIN — CLONIDINE HYDROCHLORIDE 0.3 MG: 0.1 TABLET ORAL at 20:58

## 2025-05-27 RX ADMIN — SPIRONOLACTONE 25 MG: 25 TABLET ORAL at 07:59

## 2025-05-27 RX ADMIN — Medication 150 MG: at 07:57

## 2025-05-27 RX ADMIN — MORPHINE SULFATE 1 MG: 2 INJECTION, SOLUTION INTRAMUSCULAR; INTRAVENOUS at 05:01

## 2025-05-27 RX ADMIN — IPRATROPIUM BROMIDE AND ALBUTEROL SULFATE 3 ML: .5; 3 SOLUTION RESPIRATORY (INHALATION) at 06:56

## 2025-05-27 RX ADMIN — MORPHINE SULFATE 1 MG: 2 INJECTION, SOLUTION INTRAMUSCULAR; INTRAVENOUS at 22:46

## 2025-05-27 RX ADMIN — PANTOPRAZOLE SODIUM 40 MG: 40 TABLET, DELAYED RELEASE ORAL at 05:01

## 2025-05-27 RX ADMIN — HYDRALAZINE HYDROCHLORIDE 100 MG: 50 TABLET ORAL at 20:58

## 2025-05-27 RX ADMIN — OXYCODONE HYDROCHLORIDE AND ACETAMINOPHEN 1 TABLET: 5; 325 TABLET ORAL at 15:57

## 2025-05-27 RX ADMIN — MORPHINE SULFATE 1 MG: 2 INJECTION, SOLUTION INTRAMUSCULAR; INTRAVENOUS at 10:17

## 2025-05-27 RX ADMIN — CALCITRIOL CAPSULES 0.25 MCG 0.5 MCG: 0.25 CAPSULE ORAL at 12:10

## 2025-05-27 RX ADMIN — ALPRAZOLAM 0.25 MG: 0.25 TABLET ORAL at 20:58

## 2025-05-27 RX ADMIN — OXYCODONE HYDROCHLORIDE AND ACETAMINOPHEN 1 TABLET: 5; 325 TABLET ORAL at 12:10

## 2025-05-27 NOTE — PROGRESS NOTES
Nephrology ([facility]) Progress Note      Patient:  Ricardo Hugo  YOB: 1950  Date of Service: 5/27/2025  MRN: [id number]   Acct: [id number]   Primary Care Physician: Nathan Zimmer MD  Advance Directive:   Code Status and Medical Interventions: CPR (Attempt to Resuscitate); Full Support   Ordered at: 05/21/25 1822     Code Status (Patient has no pulse and is not breathing):    CPR (Attempt to Resuscitate)     Medical Interventions (Patient has pulse or is breathing):    Full Support     Admit Date: 5/21/2025       Hospital Day: 6  Referring Provider: No ref. provider found      Patient personally seen and examined.  Complete chart including Consults, Notes, Operative Reports, Labs, Cardiology, and Radiology studies reviewed as able.        Subjective:  Ricardo Hugo is a 75 y.o. male for whom we were consulted for evaluation and treatment of end-stage renal disease. He is a Monday Wednesday Friday dialysis patient. He presented for evaluation of left leg pain and was found to have an arterial blockage. He had undergone an angiogram last month and apparently had a retained balloon by chart review that required a second procedure to retrieve. He was taken to the OR by Dr. Zabala for intervention. He was seen immediately after the OR. He was seen with family. He is complaining of pain in the operative leg. Denied other complaint upon questioning. Denied any issues with dialysis. Denied current chest pain, shortness of air at rest, nausea or vomiting.     Today, no overnight events reported.  Complaining of continued leg pain.  Denied any new complaints including chest pain, shortness of air at rest, nausea or vomiting.  He is status post dialysis on May 26.      Allergies:  Ondansetron, Zofran [ondansetron hcl], Lortab [hydrocodone-acetaminophen], and Allopurinol    Home Meds:  Medications Prior to Admission   Medication Sig Dispense Refill Last Dose/Taking     ALPRAZolam (XANAX) 0.25 MG tablet Take 1 tablet by mouth Every Night.   Taking    aspirin (aspirin) 81 MG EC tablet Take 1 tablet by mouth Daily.   Taking    atorvastatin (LIPITOR) 10 MG tablet Take 1 tablet by mouth Daily. 90 tablet 3 Taking    calcitriol (ROCALTROL) 0.5 MCG capsule Take 1 capsule by mouth Daily. 90 capsule 2 Taking    carvedilol (COREG) 25 MG tablet Take 1 tablet by mouth 2 (Two) Times a Day With Meals.   Taking    cholestyramine light 4 g packet Take 1 packet by mouth Daily. 90 packet 1 Taking    cloNIDine (CATAPRES) 0.3 MG tablet Take 1 tablet by mouth 3 times a day.   Taking    clopidogrel (PLAVIX) 75 MG tablet Take 1 tablet by mouth Daily.   Taking    Copper Gluconate (Copper Caps) 2 MG capsule Take 2 mg by mouth Daily.   Taking    empagliflozin (Jardiance) 10 MG tablet tablet Take 1 tablet by mouth Daily.   Taking    fexofenadine (ALLEGRA) 180 MG tablet Take 1 tablet by mouth Daily.   Taking    furosemide (LASIX) 40 MG tablet Take 1 tablet by mouth Daily. 90 tablet 2 Taking    hydrALAZINE (APRESOLINE) 100 MG tablet Take 1 tablet by mouth 3 (Three) Times a Day. 100mg daily   Taking    isosorbide dinitrate (ISORDIL) 10 MG tablet Take 1 tablet by mouth 3 (Three) Times a Day. 270 tablet 2 Taking    levothyroxine (Synthroid) 100 MCG tablet Take 1 tablet by mouth Every Morning. 90 tablet 2 Taking    magnesium chloride ER 64 MG DR tablet Take 1 tablet by mouth Daily.   Taking    Melatonin 10 MG tablet Take 1 tablet by mouth Every Night.   Taking    mesalamine (APRISO) 0.375 g 24 hr capsule Take 4 capsules by mouth Daily. 360 capsule 1 Taking    Multiple Vitamins-Minerals (PRESERVISION/LUTEIN) capsule Take 1 capsule by mouth 2 (two) times a day.   Taking    NIFEdipine XL (PROCARDIA XL) 60 MG 24 hr tablet Take 1 tablet by mouth Daily.   Taking    omeprazole (priLOSEC) 20 MG capsule Take 1 capsule by mouth Daily.   Taking    sodium bicarbonate 650 MG tablet Take 1 tablet by mouth 5 (Five) Times a Day.    Taking    spironolactone (ALDACTONE) 25 MG tablet Take 1 tablet by mouth Daily.   Taking    tamsulosin (FLOMAX) 0.4 MG capsule 24 hr capsule Take 1 capsule by mouth Every Night.   Taking    valsartan (DIOVAN) 320 MG tablet Take 1 tablet by mouth Daily.   Taking    vitamin D (ERGOCALCIFEROL) 1.25 MG (57102 UT) capsule capsule Take 1 capsule by mouth 1 (One) Time Per Week. Mondays   Taking    albuterol sulfate  (90 Base) MCG/ACT inhaler Inhale 2 puffs Every 6 (Six) Hours As Needed for Wheezing or Shortness of Air.       aspirin-acetaminophen-caffeine (EXCEDRIN MIGRAINE) 250-250-65 MG per tablet Take 1 tablet by mouth Every 6 (Six) Hours As Needed for Headache. (Patient taking differently: Take 3 tablets by mouth Every 6 (Six) Hours As Needed for Headache. Patient reports he takes 6-12 every day.)       Budeson-Glycopyrrol-Formoterol (Breztri Aerosphere) 160-9-4.8 MCG/ACT aerosol inhaler Inhale 2 puffs 2 (Two) Times a Day.       desoximetasone (TOPICORT) 0.25 % cream Apply 1 Application topically to the appropriate area as directed 2 (Two) Times a Day As Needed for Irritation. irritation       diphenhydrAMINE HCl, Sleep, 50 MG/30ML liquid Take 15 mL by mouth At Night As Needed (insomnia).       docusate sodium (COLACE) 100 MG capsule Take 1 capsule by mouth 3 (Three) Times a Day As Needed for Constipation.       fluticasone (FLONASE) 50 MCG/ACT nasal spray Administer 2 sprays into the nostril(s) as directed by provider Daily As Needed for Allergies.       montelukast (SINGULAIR) 10 MG tablet TAKE 1 TABLET EVERY NIGHT 90 tablet 3        Medicines:  Current Facility-Administered Medications   Medication Dose Route Frequency Provider Last Rate Last Admin    ALPRAZolam (XANAX) tablet 0.25 mg  0.25 mg Oral Nightly PRN Blayne Zabala MD   0.25 mg at 05/26/25 2057    aspirin EC tablet 81 mg  81 mg Oral Daily Blayne Zabala MD   81 mg at 05/27/25 0756    atorvastatin (LIPITOR) tablet 10 mg  10 mg Oral Daily Vj  MD Blayne   10 mg at 05/27/25 0758    sennosides-docusate (PERICOLACE) 8.6-50 MG per tablet 2 tablet  2 tablet Oral BID PRN Blayne Zabala MD   2 tablet at 05/25/25 0810    And    polyethylene glycol (MIRALAX) packet 17 g  17 g Oral Daily PRN Blayne Zabala MD        And    bisacodyl (DULCOLAX) EC tablet 5 mg  5 mg Oral Daily PRN Blayne Zabala MD        And    bisacodyl (DULCOLAX) suppository 10 mg  10 mg Rectal Daily PRN Blayne Zabala MD        calcitriol (ROCALTROL) capsule 0.5 mcg  0.5 mcg Oral Daily Blayne Zabala MD   0.5 mcg at 05/27/25 1210    carvedilol (COREG) tablet 25 mg  25 mg Oral BID With Meals Blayne Zabala MD   25 mg at 05/25/25 0810    cloNIDine (CATAPRES) tablet 0.3 mg  0.3 mg Oral TID Blayne Zabala MD   0.3 mg at 05/25/25 0810    clopidogrel (PLAVIX) tablet 75 mg  75 mg Oral Daily Blayne Zabala MD   75 mg at 05/27/25 0759    epoetin cecile-epbx (RETACRIT) 5,000 Units 2 mL injection  5,000 Units Subcutaneous Once per day on Monday Wednesday Friday Garrett Comer MD   5,000 Units at 05/26/25 0925    furosemide (LASIX) injection 20 mg  20 mg Intravenous Once Scott Johnson MD        gabapentin (NEURONTIN) capsule 300 mg  300 mg Oral Nightly Garrett Comer MD   300 mg at 05/26/25 2053    heparin (porcine) injection 3,200 Units  3,200 Units Intracatheter PRN Twan Quiroz MD   3,200 Units at 05/26/25 1400    heparin (porcine) injection 3,600 Units  3,600 Units Intravenous Once Twan Quiroz MD        hydrALAZINE (APRESOLINE) injection 10 mg  10 mg Intravenous Q6H PRN Blayne Zabala MD   10 mg at 05/22/25 0654    hydrALAZINE (APRESOLINE) tablet 100 mg  100 mg Oral TID Blayne Zabala MD   100 mg at 05/25/25 0810    ipratropium-albuterol (DUO-NEB) nebulizer solution 3 mL  3 mL Nebulization 4x Daily - RT Blayne Zabala MD   3 mL at 05/27/25 1440    iron polysaccharides (NIFEREX) capsule 150 mg  150 mg Oral Daily Garrett Comer MD   150 mg at  05/27/25 0757    isosorbide dinitrate (ISORDIL) tablet 10 mg  10 mg Oral TID - Nitrates Blayne Zabala MD   10 mg at 05/25/25 1827    levothyroxine (SYNTHROID, LEVOTHROID) tablet 100 mcg  100 mcg Oral Q AM Blayne Zabala MD   100 mcg at 05/27/25 0501    montelukast (SINGULAIR) tablet 10 mg  10 mg Oral Nightly Blayne Zabala MD   10 mg at 05/26/25 2053    Morphine sulfate (PF) injection 1 mg  1 mg Intravenous Q4H PRN Garrett Comer MD   1 mg at 05/27/25 1405    [START ON 5/28/2025] NIFEdipine XL (PROCARDIA XL) 24 hr tablet 90 mg  90 mg Oral Daily Garrett Comer MD        nitroglycerin (NITROSTAT) SL tablet 0.4 mg  0.4 mg Sublingual Q5 Min PRN Blayne Zabala MD        oxyCODONE-acetaminophen (PERCOCET) 5-325 MG per tablet 1 tablet  1 tablet Oral Q4H PRN Garrett Comer MD   1 tablet at 05/27/25 1210    pantoprazole (PROTONIX) EC tablet 40 mg  40 mg Oral Q AM Blayne Zabala MD   40 mg at 05/27/25 0501    prochlorperazine (COMPAZINE) injection 5 mg  5 mg Intravenous Q6H PRN Pancho Camacho MD        Or    prochlorperazine (COMPAZINE) tablet 5 mg  5 mg Oral Q6H PRN Pancho Camacho MD        Or    prochlorperazine (COMPAZINE) suppository 25 mg  25 mg Rectal Q12H PRN Pancho Camacho MD        promethazine (PHENERGAN) 12.5 mg in sodium chloride 0.9 % 50 mL  12.5 mg Intravenous Q6H PRN Pancho Camacho MD        sodium chloride 0.9 % flush 10 mL  10 mL Intravenous PRN Blayne Zabala MD        sodium chloride 0.9 % flush 10 mL  10 mL Intravenous Q12H Blayne Zabala MD   10 mL at 05/27/25 0800    sodium chloride 0.9 % flush 10 mL  10 mL Intravenous PRN Blayne Zabala MD        sodium chloride 0.9 % flush 10 mL  10 mL Intravenous Q12H Blayne Zabala MD   10 mL at 05/27/25 0800    sodium chloride 0.9 % flush 10 mL  10 mL Intravenous PRN Blayne Zabala MD        sodium chloride 0.9 % infusion 40 mL  40 mL Intravenous PRN Blayne Zabala MD        sodium chloride 0.9 % infusion 40 mL  40 mL  Intravenous PRN Blayne Zabala MD        spironolactone (ALDACTONE) tablet 25 mg  25 mg Oral Daily Blayne Zabala MD   25 mg at 05/27/25 0759    tamsulosin (FLOMAX) 24 hr capsule 0.4 mg  0.4 mg Oral Nightly Blayne Zabala MD   0.4 mg at 05/26/25 2053    valsartan (DIOVAN) tablet 320 mg  320 mg Oral Daily Blayne Zabala MD   320 mg at 05/27/25 0756       Past Medical History:  Past Medical History:   Diagnosis Date    3-vessel CAD 08/11/2020    Allergic rhinitis     Anemia     Anxiety disorder 04/27/2020    Arthritis     Asymmetrical sensorineural hearing loss 06/28/2017    Atherosclerosis of native artery of both lower extremities with intermittent claudication 07/18/2019    Avascular necrosis of femoral head, left 07/11/2020    right hip after surgery    Carotid stenosis     Chronic mucoid otitis media     Chronic rhinitis     COPD (chronic obstructive pulmonary disease)     Coronary artery disease     HEART BYPASS 2004    Crohn's disease of large intestine with other complication 07/30/2020    Chronic diarrhea Colonoscopy July 2020 revealed mild patchy scattered hemosiderin staining with inflammation more so in rectosigmoid area.  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    Iron deficiency anemia 07/14/2020    Mixed hearing loss of left ear     PAD (peripheral artery disease) 08/11/2020    Perianal abscess     Pernicious anemia 08/17/2020    took shots but never diagnosed with b12 deficiency    Personal history of alcoholism 08/11/2020    quit drinking in 2013    Prostatic hypertrophy 08/11/2020    Sensorineural hearing loss     Sepsis with acute  renal failure 09/15/2020    Shortness of breath 05/27/2021    Tinnitus     Ventricular tachycardia, nonsustained 07/14/2020    Weight loss 07/11/2020       Past Surgical History:  Past Surgical History:   Procedure Laterality Date    AORTOGRAM Right 4/25/2025    Procedure: RIGHT LOWER EXTREMITY ANGIOGRAM, SHOCKWAVE LITHOTRIPSY, BALLOON ANGIOPLASTY, MYNX CLOSURE;  Surgeon: Gil Pineda DO;  Location: [facility] HYBRID OR;  Service: Vascular;  Laterality: Right;    AORTOGRAM Left 5/22/2025    Procedure: LEFT LOWER EXTREMITY ANGIOGRAM, SHOCKWAVE LITHOTRIPSY, BALLOON ANGIOPLASTY, STENT PLACEMENT, MYNX CLOSURE;  Surgeon: Blayne Zabala MD;  Location: [facility] HYBRID OR;  Service: Vascular;  Laterality: Left;    ARTERY SURGERY  2021    right side on neck    CAROTID ENDARTERECTOMY Right 05/10/2021    Procedure: RIGHT CAROTID ENDARTERECTOMY WITH EEG;  Surgeon: Gil Pineda DO;  Location: [facility] HYBRID OR 12;  Service: Vascular;  Laterality: Right;    COLONOSCOPY N/A 07/02/2020    Procedure: COLONOSCOPY WITH ANESTHESIA;  Surgeon: Adrien Brewster MD;  Location: [facility] ENDOSCOPY;  Service: Gastroenterology;  Laterality: N/A;  pre op: diarrhea  post op: polyps  PCP: Joe Velasco MD    COLONOSCOPY N/A 10/13/2020    Procedure: COLONOSCOPY WITH ANESTHESIA;  Surgeon: Adrien Brewster MD;  Location: [facility] ENDOSCOPY;  Service: Gastroenterology;  Laterality: N/A;  Pre: Chronic Diarrhea, Crohn's  Post: AVM  Dr. Neftali Velasco  CO2 Inflation Used    COLONOSCOPY N/A 12/08/2023    Procedure: COLONOSCOPY WITH ANESTHESIA;  Surgeon: Adrien Brewster MD;  Location: [facility] ENDOSCOPY;  Service: Gastroenterology;  Laterality: N/A;  pre chrone's disease  post sub optimal prep, polyp, chrone's      CORONARY ARTERY BYPASS GRAFT  2003    x3    ENDOSCOPY N/A 11/02/2021    Procedure: ESOPHAGOGASTRODUODENOSCOPY WITH ANESTHESIA;  Surgeon: Bridger Bell MD;  Location: [facility] ENDOSCOPY;  Service: Gastroenterology;   Laterality: N/A;  pre anemia;gi bleed  post  gi bleed;debbieki ring  Dr. ERIC Velasco    ENDOSCOPY N/A 10/10/2023    Procedure: ESOPHAGOGASTRODUODENOSCOPY WITH ANESTHESIA;  Surgeon: Adrien Brewster MD;  Location: [facility] ENDOSCOPY;  Service: Gastroenterology;  Laterality: N/A;  preop; anemia  postop esophagitis ; R/O barretts   PCP Randall Beata    EYE SURGERY Bilateral     catorac    INCISION AND DRAINAGE PERIRECTAL ABSCESS N/A 2017    Procedure: INCISION AND DRAINAGE OF JEET ANAL ABSCESS;  Surgeon: Lynette Smith MD;  Location: [facility] OR;  Service:     INGUINAL HERNIA REPAIR Bilateral 2023    Procedure: INGUINAL HERNIA BILATERAL REPAIR LAPAROSCOPIC WITH DAVINCI ROBOT WITH MESH;  Surgeon: Tahira Rivera MD;  Location: [facility] OR;  Service: Robotics - DaVinci;  Laterality: Bilateral;    INSERTION HEMODIALYSIS CATHETER N/A 2025    Procedure: HEMODIALYSIS CATHETER PLACEMENT;  Surgeon: Gil Pineda DO;  Location: [facility] HYBRID OR;  Service: Vascular;  Laterality: N/A;    MYRINGOTOMY W/ TUBES Left 2017    06/10/2016    TONSILLECTOMY      TOTAL HIP ARTHROPLASTY Right        Family History  Family History   Problem Relation Age of Onset    Breast cancer Mother     Dementia Father     Glaucoma Father     No Known Problems Daughter     Colon polyps Neg Hx     Colon cancer Neg Hx        Social History  Social History     Socioeconomic History    Marital status:    Tobacco Use    Smoking status: Former     Current packs/day: 0.00     Average packs/day: 0.5 packs/day for 25.8 years (12.9 ttl pk-yrs)     Types: Cigarettes     Start date:      Quit date: 10/13/2013     Years since quittin.6     Passive exposure: Past    Smokeless tobacco: Never    Tobacco comments:     quit 2013   Vaping Use    Vaping status: Former    Substances: Nicotine    Devices: Pre-filled or refillable cartridge   Substance and Sexual Activity    Alcohol use: Not Currently    Drug use: No    Sexual  activity: Not Currently     Partners: Female       Review of Systems:  History obtained from chart review and the patient  General ROS: No fever or chills  Respiratory ROS: No cough, shortness of breath, wheezing  Cardiovascular ROS: No chest pain or palpitations  Gastrointestinal ROS: No abdominal pain or melena  Genito-Urinary ROS: No dysuria or hematuria  Psych ROS: No anxiety and depression  14 point ROS reviewed with the patient and negative except as noted above and in the HPI unless unable to obtain.    Objective:  Patient Vitals for the past 24 hrs:   BP Temp Temp src Pulse Resp SpO2 Weight   05/27/25 1446 -- -- -- 60 16 96 % --   05/27/25 1440 -- -- -- 57 16 95 % --   05/27/25 1303 137/41 97.5 °F (36.4 °C) Axillary 63 16 93 % --   05/27/25 1106 -- -- -- 61 16 99 % --   05/27/25 1101 -- -- -- 66 16 98 % --   05/27/25 0746 177/49 98.4 °F (36.9 °C) Oral 66 16 97 % --   05/27/25 0703 -- -- -- -- -- -- 62.1 kg (136 lb 14.5 oz)   05/27/25 0702 -- -- -- 68 16 98 % --   05/27/25 0656 -- -- -- 67 16 91 % --   05/27/25 0445 159/49 98.8 °F (37.1 °C) Oral 67 16 94 % --   05/27/25 0008 179/43 99 °F (37.2 °C) Oral 66 16 94 % --   05/26/25 2141 -- -- -- 73 16 94 % --   05/26/25 2014 170/53 99.1 °F (37.3 °C) Oral 71 18 93 % --   05/26/25 1501 156/47 98.7 °F (37.1 °C) Oral 73 18 94 % --       Intake/Output Summary (Last 24 hours) at 5/27/2025 1451  Last data filed at 5/27/2025 1357  Gross per 24 hour   Intake 1350 ml   Output 300 ml   Net 1050 ml     General: awake/alert   Chest:  clear to auscultation bilaterally without respiratory distress  CVS: regular rate and rhythm  Abdominal: soft, nontender, positive bowel sounds  Extremities: no cyanosis or edema  Skin: warm and dry without rash      Labs:  Results from last 7 days   Lab Units 05/27/25  0320 05/26/25  0309 05/25/25  0457   WBC 10*3/mm3 6.87 6.58 8.92   HEMOGLOBIN g/dL 7.9* 8.0* 8.5*   HEMATOCRIT % 24.9* 24.8* 26.3*   PLATELETS 10*3/mm3 113* 85* 113*  "        Results from last 7 days   Lab Units 05/27/25  0320 05/26/25  0309 05/25/25  0457 05/24/25  0214 05/23/25  0303 05/22/25  1624 05/21/25  1632   SODIUM mmol/L 139 136 139   < > 139   < > 137   POTASSIUM mmol/L 3.8 4.0 3.3*   < > 3.2*   < > 3.1*   CHLORIDE mmol/L 98 97* 96*   < > 99   < > 95*   CO2 mmol/L 29.0 26.0 26.0   < > 25.0   < > 30.0*   BUN mg/dL 31* 62* 43*   < > 35*   < > 14   CREATININE mg/dL 2.89* 4.45* 3.75*   < > 3.80*   < > 2.29*   CALCIUM mg/dL 8.8 8.8 9.3   < > 9.2   < > 8.5*   EGFR mL/min/1.73 21.7* 12.9* 15.9*   < > 15.6*   < > 28.7*   BILIRUBIN mg/dL  --   --  0.4  --  0.2  --  0.2   ALK PHOS U/L  --   --  102  --  116  --  110   ALT (SGPT) U/L  --   --  <5  --  5  --  5   AST (SGOT) U/L  --   --  20  --  20  --  18   GLUCOSE mg/dL 106* 97 138*   < > 105*   < > 99    < > = values in this interval not displayed.       Radiology:   Imaging Results (Last 72 Hours)       ** No results found for the last 72 hours. **            Culture:  No results found for: \"BLOODCX\", \"URINECX\", \"WOUNDCX\", \"MRSACX\", \"RESPCX\", \"STOOLCX\"      Assessment   End-stage renal disease  Hypertension  Anemia and chronic kidney disease  Peripheral vascular disease  Thrombocytopenia  Hypokalemia     Plan:  Discussed with patient, family  Workup reviewed today  Monitor labs  Dialysis Monday Wednesday Friday per routine scheduling  Vascular evaluation reviewed as current, noted plans for [facility] on May 28  Continue BP meds.      Bolivar Rueda MD  5/27/2025  14:51 CDT      "

## 2025-05-27 NOTE — TELEPHONE ENCOUNTER
Caller: [email or web address]    Relationship: Emergency Contact    Best call back number: [phone]     What is the best time to reach you: anytime    Who are you requesting to speak with (clinical staff, provider,  specific staff member): BENI    Do you know the name of the person who called: PTS DAUGHTER BELA    What was the call regarding: SHE WOULD LIKE BENI TO GIVE HER A CALL TO DISCUSS SOME THINGS THANK YOU

## 2025-05-27 NOTE — PLAN OF CARE
Goal Outcome Evaluation:                 Patient alert and oriented x4. Moderate pain in right left being treated with prn medications. Angiogram scheduled at some point next week with Dr. Pineda. No needs at this time.

## 2025-05-27 NOTE — PROGRESS NOTES
[facility] Medicine Services  INPATIENT PROGRESS NOTE    Patient Name: Ricardo Hugo  Date of Admission: 5/21/2025  Today's Date: 05/27/25  Length of Stay: 6  Primary Care Physician: Nathan Zimmer MD    Subjective   Chief Complaint: Peripheral vascular disease/pain/dialysis/anemia/thrombocytopenia   HPI   Systolic blood pressure slightly high, increase Procardia for now.  Afebrile.  Status post dialysis yesterday removed 2 L.  Patient is on room air.  Creatinine is improving.  Hemoglobin stable.  Increase in platelets.  White blood cells normal.    Review of Systems   Constitutional:  Positive for activity change, appetite change and fatigue. Negative for chills and fever.   HENT:  Negative for hearing loss, nosebleeds, tinnitus and trouble swallowing.    Eyes:  Negative for visual disturbance.   Respiratory:  Negative for cough, chest tightness, shortness of breath and wheezing.    Cardiovascular:  Negative for chest pain, palpitations and leg swelling.   Gastrointestinal:  Negative for abdominal distention, abdominal pain, blood in stool, constipation, diarrhea, nausea and vomiting.   Endocrine: Negative for cold intolerance, heat intolerance, polydipsia, polyphagia and polyuria.   Genitourinary:  Negative for decreased urine volume, difficulty urinating, dysuria, flank pain, frequency and hematuria.   Musculoskeletal:  Positive for arthralgias, gait problem and myalgias. Negative for joint swelling.   Skin:  Negative for rash.   Allergic/Immunologic: Negative for immunocompromised state.   Neurological:  Positive for weakness. Negative for dizziness, syncope, light-headedness and headaches.   Hematological:  Negative for adenopathy. Does not bruise/bleed easily.   Psychiatric/Behavioral:  Negative for confusion and sleep disturbance. The patient is not nervous/anxious.    All pertinent negatives and positives are as above. All other systems have been reviewed and are  negative unless otherwise stated.     Objective    Temp:  [98.1 °F (36.7 °C)-99.1 °F (37.3 °C)] 98.4 °F (36.9 °C)  Heart Rate:  [62-73] 66  Resp:  [16-18] 16  BP: (156-179)/(43-53) 177/49  Physical Exam  Vitals and nursing note reviewed.   Constitutional:       Comments: Advanced age.  Cachectic.  Chronically ill.   HENT:      Head: Normocephalic.   Eyes:      Conjunctiva/sclera: Conjunctivae normal.      Pupils: Pupils are equal, round, and reactive to light.   Cardiovascular:      Rate and Rhythm: Normal rate and regular rhythm.      Heart sounds: Normal heart sounds.   Pulmonary:      Effort: No respiratory distress.      Comments: Diminished breath sound bilateral, clear, on room air.  Abdominal:      General: Bowel sounds are normal. There is no distension.      Palpations: Abdomen is soft.      Tenderness: There is no abdominal tenderness.   Musculoskeletal:         General: No swelling.      Cervical back: Neck supple.   Skin:     General: Skin is warm and dry.      Findings: No rash.   Neurological:      Mental Status: He is alert and oriented to person, place, and time.      Motor: Weakness present.      Coordination: Coordination abnormal.      Gait: Gait abnormal.   Psychiatric:         Mood and Affect: Mood normal.         Behavior: Behavior normal.         Thought Content: Thought content normal.          Results Review:  I have reviewed the labs, radiology results, and diagnostic studies.    Laboratory Data:   Results from last 7 days   Lab Units 05/27/25  0320 05/26/25  0309 05/25/25  0457   WBC 10*3/mm3 6.87 6.58 8.92   HEMOGLOBIN g/dL 7.9* 8.0* 8.5*   HEMATOCRIT % 24.9* 24.8* 26.3*   PLATELETS 10*3/mm3 113* 85* 113*        Results from last 7 days   Lab Units 05/27/25  0320 05/26/25  0309 05/25/25  0457 05/24/25  0214 05/23/25  0303 05/22/25  1624 05/21/25  1632   SODIUM mmol/L 139 136 139   < > 139   < > 137   POTASSIUM mmol/L 3.8 4.0 3.3*   < > 3.2*   < > 3.1*   CHLORIDE mmol/L 98 97* 96*   < > 99  "  < > 95*   CO2 mmol/L 29.0 26.0 26.0   < > 25.0   < > 30.0*   BUN mg/dL 31* 62* 43*   < > 35*   < > 14   CREATININE mg/dL 2.89* 4.45* 3.75*   < > 3.80*   < > 2.29*   CALCIUM mg/dL 8.8 8.8 9.3   < > 9.2   < > 8.5*   BILIRUBIN mg/dL  --   --  0.4  --  0.2  --  0.2   ALK PHOS U/L  --   --  102  --  116  --  110   ALT (SGPT) U/L  --   --  <5  --  5  --  5   AST (SGOT) U/L  --   --  20  --  20  --  18   GLUCOSE mg/dL 106* 97 138*   < > 105*   < > 99    < > = values in this interval not displayed.       Culture Data:   No results found for: \"BLOODCX\", \"URINECX\", \"WOUNDCX\", \"MRSACX\", \"RESPCX\", \"STOOLCX\"    Radiology Data:   Imaging Results (Last 24 Hours)       ** No results found for the last 24 hours. **            I have reviewed the patient's current medications.     Assessment/Plan   Assessment  Active Hospital Problems    Diagnosis     **Arterial occlusion     Thrombocytopenia     Acute pain of left lower extremity     ESRD (end stage renal disease) on dialysis     Abnormal TSH     Chronic diastolic (congestive) heart failure     Anticoagulated     Sensorineural hearing loss (SNHL), bilateral     Eustachian tube dysfunction, bilateral     Nasal septal deviation     Mixed hyperlipidemia     CLL (chronic lymphocytic leukemia)     Anemia due to chronic kidney disease     Diastolic dysfunction     Symptomatic anemia     Resistant hypertension     Peripheral arterial disease     IHD (ischemic heart disease)     Essential hypertension     Chronic rhinitis        Treatment Plan  Acute ischemic left lower extremity /left leg pain.  Vascular consult.  Status post surgery 5/22/2025-Right common femoral artery access under ultrasound guidance, left posterior tibial artery access ultrasound guidance, Left lower extremity arteriogram with third order selection angiographic interpretation, PTA of the left SFA with a 3 x 60 Tammy cross balloon, Shockwave lithotripsy of the left external iliac artery with a 6 x 60 shockwave balloon, " Left external iliac artery stenting with a 8 x 60 ever flex stent postdilated with a 7 x 60 Ever Cross balloon  Vascular-plan for left above-the-knee amputation next week.     End-stage renal disease.  Dialysis patient.  Calcitriol . Creatinine decreased.  Patient had dialysis yesterday.     Hypokalemia.  Resolved. Magnesium-normal.     Anemia. Status post 1 unit blood transfusion 5/24/25.  Hemoglobin stable.  Niferex . Procrit.     Thrombocytopenia.  Platelet increased.     CAD/hypertension/hyperlipidemia/CHF.  Aspirin . Lipitor.  Coreg . Catapres.  Plavix.  Increase Procardia.  Hydralazine . Isordil . Aldactone.  Diovan . hydralazine as needed.  Nitro as needed.  Echocardiogram 1/11/2025- 56 - 60%,  mild septal asymmetric hypertrophy, diastolic function is consistent with (grade II w/high LAP) pseudonormalization, Normal right ventricular cavity size and systolic function, left atrial cavity is mild to moderately dilated, right atrial cavity is dilated, Mild aortic valve stenosis, Mild to moderate mitral valve regurgitation, Moderate to severe tricuspid valve regurgitation,  tricuspid regurgitation is markedly elevated (>55 mmHg).     COPD.  Not exacerbation.  DuoNebs.  Singulair.  Hypothyroidism.  Synthroid.  Incentive spirometer.  Patient is on room air.     Reflux . Protonix.  Compazine as needed.  Phenergan as needed.     Prostate hypertrophy.  Flomax.     Anxiety/depression.  Xanax as needed.     Pain.  Morphine as needed.  Percocet as needed.  Neurontin at Night.     Nutrition . Diabetic diet.  Nutrition consult.     Deconditioning.  PT consult.     Medical Decision Making  Number and Complexity of problems: Ischemic left leg/end-stage renal disease/hypokalemia/anemia/thrombocytopenia/CAD/CHF/COPD  Differential Diagnosis: None     Conditions and Status        Condition is unchanged.     [facility] Data  External documents reviewed: Previous note .  Cardiac tracing (EKG, telemetry) interpretation: No  monitor  Radiology interpretation: Echo  Labs reviewed: Laboratory  Any tests that were considered but not ordered: Lab in a.m.     Decision rules/scores evaluated (example [id number], Wells, etc): None     Discussed with: Patient and daughter     Care Planning  Shared decision making: Patient and daughter  Code status and discussions: Full code     Disposition  Social Determinants of Health that impact treatment or disposition: From home  Patient probably need rehab placement.  2 to 5 days.         Electronically signed by Garrett Comer MD, 05/27/25, 08:58 CDT.

## 2025-05-27 NOTE — PLAN OF CARE
Admitted 5/21 for L lower extrem leg pain  Consult vascular surgery; arterial occlusion; plan for L AKA this week TBD; continue ASA, plavix; pain meds given per mar   Consult nephro; HD management; HD MWF; 2 Liters removed 5/26    Goal Outcome Evaluation:  Plan of Care Reviewed With: patient        Progress: improving

## 2025-05-28 LAB
ANION GAP SERPL CALCULATED.3IONS-SCNC: 13 MMOL/L (ref 5–15)
BUN SERPL-MCNC: 47.5 MG/DL (ref 8–23)
BUN/CREAT SERPL: 12.1 (ref 7–25)
CALCIUM SPEC-SCNC: 8.9 MG/DL (ref 8.6–10.5)
CHLORIDE SERPL-SCNC: 97 MMOL/L (ref 98–107)
CO2 SERPL-SCNC: 27 MMOL/L (ref 22–29)
CREAT SERPL-MCNC: 3.92 MG/DL (ref 0.76–1.27)
DEPRECATED RDW RBC AUTO: 66.3 FL (ref 37–54)
EGFRCR SERPLBLD CKD-EPI 2021: 15 ML/MIN/1.73
ERYTHROCYTE [DISTWIDTH] IN BLOOD BY AUTOMATED COUNT: 18.1 % (ref 12.3–15.4)
GLUCOSE SERPL-MCNC: 106 MG/DL (ref 65–99)
HCT VFR BLD AUTO: 26.7 % (ref 37.5–51)
HGB BLD-MCNC: 8.5 G/DL (ref 13–17.7)
MCH RBC QN AUTO: 32 PG (ref 26.6–33)
MCHC RBC AUTO-ENTMCNC: 31.8 G/DL (ref 31.5–35.7)
MCV RBC AUTO: 100.4 FL (ref 79–97)
PLATELET # BLD AUTO: 140 10*3/MM3 (ref 140–450)
PMV BLD AUTO: 10.2 FL (ref 6–12)
POTASSIUM SERPL-SCNC: 4.1 MMOL/L (ref 3.5–5.2)
RBC # BLD AUTO: 2.66 10*6/MM3 (ref 4.14–5.8)
SODIUM SERPL-SCNC: 137 MMOL/L (ref 136–145)
WBC NRBC COR # BLD AUTO: 8.79 10*3/MM3 (ref 3.4–10.8)

## 2025-05-28 PROCEDURE — 25010000002 PROCHLORPERAZINE 10 MG/2ML SOLUTION: Performed by: HOSPITALIST

## 2025-05-28 PROCEDURE — 25010000002 MORPHINE PER 10 MG: Performed by: FAMILY MEDICINE

## 2025-05-28 PROCEDURE — 25010000002 HEPARIN (PORCINE) PER 1000 UNITS: Performed by: INTERNAL MEDICINE

## 2025-05-28 PROCEDURE — 80048 BASIC METABOLIC PNL TOTAL CA: CPT | Performed by: FAMILY MEDICINE

## 2025-05-28 PROCEDURE — 85027 COMPLETE CBC AUTOMATED: CPT | Performed by: FAMILY MEDICINE

## 2025-05-28 RX ORDER — ALBUTEROL SULFATE 0.83 MG/ML
2.5 SOLUTION RESPIRATORY (INHALATION) EVERY 6 HOURS PRN
Status: DISCONTINUED | OUTPATIENT
Start: 2025-05-28 | End: 2025-05-30

## 2025-05-28 RX ORDER — IPRATROPIUM BROMIDE AND ALBUTEROL SULFATE 2.5; .5 MG/3ML; MG/3ML
3 SOLUTION RESPIRATORY (INHALATION)
Status: DISCONTINUED | OUTPATIENT
Start: 2025-05-28 | End: 2025-05-30

## 2025-05-28 RX ADMIN — MONTELUKAST SODIUM 10 MG: 10 TABLET, COATED ORAL at 20:40

## 2025-05-28 RX ADMIN — LEVOTHYROXINE SODIUM 100 MCG: 0.1 TABLET ORAL at 04:11

## 2025-05-28 RX ADMIN — GABAPENTIN 300 MG: 300 CAPSULE ORAL at 20:40

## 2025-05-28 RX ADMIN — BISACODYL 5 MG: 5 TABLET, COATED ORAL at 06:34

## 2025-05-28 RX ADMIN — TAMSULOSIN HYDROCHLORIDE 0.4 MG: 0.4 CAPSULE ORAL at 20:40

## 2025-05-28 RX ADMIN — OXYCODONE HYDROCHLORIDE AND ACETAMINOPHEN 1 TABLET: 5; 325 TABLET ORAL at 17:45

## 2025-05-28 RX ADMIN — OXYCODONE HYDROCHLORIDE AND ACETAMINOPHEN 1 TABLET: 5; 325 TABLET ORAL at 09:10

## 2025-05-28 RX ADMIN — PROCHLORPERAZINE EDISYLATE 5 MG: 5 INJECTION INTRAMUSCULAR; INTRAVENOUS at 06:34

## 2025-05-28 RX ADMIN — PROCHLORPERAZINE EDISYLATE 5 MG: 5 INJECTION INTRAMUSCULAR; INTRAVENOUS at 17:52

## 2025-05-28 RX ADMIN — ISOSORBIDE DINITRATE 10 MG: 10 TABLET ORAL at 14:29

## 2025-05-28 RX ADMIN — MORPHINE SULFATE 1 MG: 2 INJECTION, SOLUTION INTRAMUSCULAR; INTRAVENOUS at 06:34

## 2025-05-28 RX ADMIN — OXYCODONE HYDROCHLORIDE AND ACETAMINOPHEN 1 TABLET: 5; 325 TABLET ORAL at 04:12

## 2025-05-28 RX ADMIN — Medication 10 ML: at 20:40

## 2025-05-28 RX ADMIN — HEPARIN SODIUM 3200 UNITS: 1000 INJECTION INTRAVENOUS; SUBCUTANEOUS at 12:05

## 2025-05-28 RX ADMIN — CARVEDILOL 25 MG: 25 TABLET, FILM COATED ORAL at 17:45

## 2025-05-28 RX ADMIN — MORPHINE SULFATE 1 MG: 2 INJECTION, SOLUTION INTRAMUSCULAR; INTRAVENOUS at 02:51

## 2025-05-28 RX ADMIN — OXYCODONE HYDROCHLORIDE AND ACETAMINOPHEN 1 TABLET: 5; 325 TABLET ORAL at 00:15

## 2025-05-28 RX ADMIN — PANTOPRAZOLE SODIUM 40 MG: 40 TABLET, DELAYED RELEASE ORAL at 04:11

## 2025-05-28 NOTE — PROGRESS NOTES
Assessment tool to be used for patients with existing breathing treatments ordered by hospitalist                               Respiratory Therapist Driven Protocol - RT to Assess and Treat Algorithm    Item 0 Points 1 Point 2 Points 3 Points 4 Points Subtotal   Mental Status Alert, orientated, cooperative Lethargic, follows commands Confused, not following commands Obtunded or Somnolent Comatose 0   Respiratory Pattern Regular RR  8-16 breaths/minute Increased RR  18-25 breaths/minute Dyspnea on exertion, irregular RR  26-30/minute Shortness of breath,  RR 31-35 breaths/minute Accessory muscle use, severe SOB  RR > 35 breath/minute 0   Breath Sounds Clear Decreased unilaterally Decreased bilaterally Basilar crackles Wheezing and/or rhonchi 1   Cough Strong, spontaneous non-productive Strong productive Weak, non-productive Weak productive or weak with rhonchi Absent or may require suctioning 0   Pulmonary Status Nonsmoker or no previous history > 1 year quit < 1 PPD  < 1 year quit >  or = 1 PPD Diagnosed pulmonary disease (severe or chronic) Severe or chronic pulmonary disease with exacerbation 3   Surgical Status None General surgery (non-abdominal or non-thoracic) Lower abdominal Thoracic or upper abdominal Thoracic with pulmonary disease 1   Chest X-ray Clear/Unavailable Chronic changes Infiltrates, atelectasis or pleural effusion Infiltrates > 1 lobe Diffuse infiltrates and atelectasis and/or effusions 0   Activity level Ambulatory Ambulatory with assistance Non-ambulatory Paraplegic Quadriplegic 1                     Total Score 6   Score    Drug Therapy Frequency  20 or >    Q4 Duoneb & Q3 Albuterol PRN 15 - 19     Q6 Duoneb & Q4 Albuterol PRN 10 - 14    QID Duoneb & Q4 Albuterol PRN 5 - 9    TID Duoneb & Q6 Albuterol PRN 0 - 4    Q4 PRN Duoneb or Q4 PRN Albuterol    Incentive Spirometry - Initial RT instruct    Lung Expansion Therapy (PEP) Bronchopulmonary Hygiene (CPT)   Q4 & PRN - Severe  atelectasis, poor oxygenation Q4 - copious secretions, dyspnea, unable to sleep, mucus plugging   QID - High risk for persistent atelectasis, existence of atelectasis QID & Q4 PRN - Moderate secretion production   TID - At risk for developing atelectasis TID - small amounts of secretions with poor cough   BID - prevention of atelectasis BID - unable to breathe deeply and cough spontaneously   *RT Protocol patients will be re-assessed/re-evaluated every 48 hours.    *Patients who are home nebulizer treatments will be protocoled to no less than their home regimen and will remain     on their home regimen with re-evaluations as needed with changes in patient condition.    RT Comments/Recommendations: Patient has been assessed. Treatment will change to TID Duoneb and Q6 Albuterol PRN.

## 2025-05-28 NOTE — PLAN OF CARE
Admitted 5/21 for L lower extrem leg pain  Consult vascular surgery; arterial occlusion; continue ASA, plavix; pain meds given per mar   Vascular is planning attempt at revascularization prior to amputation. Potentially this Friday (5/30) or early next week   Consult nephro; HD management; HD MWF  Problem: Fall Injury Risk  Goal: Absence of Fall and Fall-Related Injury  Outcome: Progressing  Intervention: Promote Injury-Free Environment  Recent Flowsheet Documentation  Taken 5/28/2025 0015 by Denzel Cook RN  Safety Promotion/Fall Prevention: safety round/check completed  Taken 5/27/2025 2316 by Denzel Cook RN  Safety Promotion/Fall Prevention: safety round/check completed  Taken 5/27/2025 2246 by Denzel Cook RN  Safety Promotion/Fall Prevention: safety round/check completed  Taken 5/27/2025 2100 by Denzel Cook RN  Safety Promotion/Fall Prevention: safety round/check completed  Taken 5/27/2025 2000 by Denzel Cook RN  Safety Promotion/Fall Prevention: safety round/check completed  Taken 5/27/2025 1958 by Denzel Cook RN  Safety Promotion/Fall Prevention: safety round/check completed  Taken 5/27/2025 1936 by Denzel Cook RN  Safety Promotion/Fall Prevention: safety round/check completed   Goal Outcome Evaluation:  Plan of Care Reviewed With: patient        Progress: improving

## 2025-05-28 NOTE — PROGRESS NOTES
Nephrology ([facility]) Progress Note      Patient:  Ricardo Hugo  YOB: 1950  Date of Service: 5/28/2025  MRN: [id number]   Acct: [id number]   Primary Care Physician: Nathan Zimmer MD  Advance Directive:   Code Status and Medical Interventions: CPR (Attempt to Resuscitate); Full Support   Ordered at: 05/21/25 1822     Code Status (Patient has no pulse and is not breathing):    CPR (Attempt to Resuscitate)     Medical Interventions (Patient has pulse or is breathing):    Full Support     Admit Date: 5/21/2025       Hospital Day: 7  Referring Provider: No ref. provider found      Patient personally seen and examined.  Complete chart including Consults, Notes, Operative Reports, Labs, Cardiology, and Radiology studies reviewed as able.        Subjective:  Ricardo Hugo is a 75 y.o. male for whom we were consulted for evaluation and treatment of end stage renal disease on hemodialysis Monday Wednesday Friday.  Presented with left lower leg pain. Recently had right lower extremity angiogram which has significantly improved the pain he was having in his right leg. Dr Zabala took patient to OR on 5/22 for angiogram and stenting of left external iliac artery.  Has been tolerating inpatient HD well since arrival. Unfortunately pain in left leg has persisted and is now being considered for left AKA    Today is awake and alert. Seen during dialysis. Tolerating well. Left foot/leg pain but no new complaints.   Dialysis   Patient was seen and evaluated on renal replacement therapy and I have personally evaluated the patient and directed the therapy  Modality: Hemodialysis  Access: Catheter  Location: right upper  QB: 450  QD: 700  UF: 2000    Allergies:  Ondansetron, Zofran [ondansetron hcl], Lortab [hydrocodone-acetaminophen], and Allopurinol    Home Meds:  Medications Prior to Admission   Medication Sig Dispense Refill Last Dose/Taking    ALPRAZolam (XANAX) 0.25 MG tablet  Take 1 tablet by mouth Every Night.   Taking    aspirin (aspirin) 81 MG EC tablet Take 1 tablet by mouth Daily.   Taking    atorvastatin (LIPITOR) 10 MG tablet Take 1 tablet by mouth Daily. 90 tablet 3 Taking    calcitriol (ROCALTROL) 0.5 MCG capsule Take 1 capsule by mouth Daily. 90 capsule 2 Taking    carvedilol (COREG) 25 MG tablet Take 1 tablet by mouth 2 (Two) Times a Day With Meals.   Taking    cholestyramine light 4 g packet Take 1 packet by mouth Daily. 90 packet 1 Taking    cloNIDine (CATAPRES) 0.3 MG tablet Take 1 tablet by mouth 3 times a day.   Taking    clopidogrel (PLAVIX) 75 MG tablet Take 1 tablet by mouth Daily.   Taking    Copper Gluconate (Copper Caps) 2 MG capsule Take 2 mg by mouth Daily.   Taking    empagliflozin (Jardiance) 10 MG tablet tablet Take 1 tablet by mouth Daily.   Taking    fexofenadine (ALLEGRA) 180 MG tablet Take 1 tablet by mouth Daily.   Taking    furosemide (LASIX) 40 MG tablet Take 1 tablet by mouth Daily. 90 tablet 2 Taking    hydrALAZINE (APRESOLINE) 100 MG tablet Take 1 tablet by mouth 3 (Three) Times a Day. 100mg daily   Taking    isosorbide dinitrate (ISORDIL) 10 MG tablet Take 1 tablet by mouth 3 (Three) Times a Day. 270 tablet 2 Taking    levothyroxine (Synthroid) 100 MCG tablet Take 1 tablet by mouth Every Morning. 90 tablet 2 Taking    magnesium chloride ER 64 MG DR tablet Take 1 tablet by mouth Daily.   Taking    Melatonin 10 MG tablet Take 1 tablet by mouth Every Night.   Taking    mesalamine (APRISO) 0.375 g 24 hr capsule Take 4 capsules by mouth Daily. 360 capsule 1 Taking    Multiple Vitamins-Minerals (PRESERVISION/LUTEIN) capsule Take 1 capsule by mouth 2 (two) times a day.   Taking    NIFEdipine XL (PROCARDIA XL) 60 MG 24 hr tablet Take 1 tablet by mouth Daily.   Taking    omeprazole (priLOSEC) 20 MG capsule Take 1 capsule by mouth Daily.   Taking    sodium bicarbonate 650 MG tablet Take 1 tablet by mouth 5 (Five) Times a Day.   Taking    spironolactone  (ALDACTONE) 25 MG tablet Take 1 tablet by mouth Daily.   Taking    tamsulosin (FLOMAX) 0.4 MG capsule 24 hr capsule Take 1 capsule by mouth Every Night.   Taking    valsartan (DIOVAN) 320 MG tablet Take 1 tablet by mouth Daily.   Taking    vitamin D (ERGOCALCIFEROL) 1.25 MG (87406 UT) capsule capsule Take 1 capsule by mouth 1 (One) Time Per Week. Mondays   Taking    albuterol sulfate  (90 Base) MCG/ACT inhaler Inhale 2 puffs Every 6 (Six) Hours As Needed for Wheezing or Shortness of Air.       aspirin-acetaminophen-caffeine (EXCEDRIN MIGRAINE) 250-250-65 MG per tablet Take 1 tablet by mouth Every 6 (Six) Hours As Needed for Headache. (Patient taking differently: Take 3 tablets by mouth Every 6 (Six) Hours As Needed for Headache. Patient reports he takes 6-12 every day.)       Budeson-Glycopyrrol-Formoterol (Breztri Aerosphere) 160-9-4.8 MCG/ACT aerosol inhaler Inhale 2 puffs 2 (Two) Times a Day.       desoximetasone (TOPICORT) 0.25 % cream Apply 1 Application topically to the appropriate area as directed 2 (Two) Times a Day As Needed for Irritation. irritation       diphenhydrAMINE HCl, Sleep, 50 MG/30ML liquid Take 15 mL by mouth At Night As Needed (insomnia).       docusate sodium (COLACE) 100 MG capsule Take 1 capsule by mouth 3 (Three) Times a Day As Needed for Constipation.       fluticasone (FLONASE) 50 MCG/ACT nasal spray Administer 2 sprays into the nostril(s) as directed by provider Daily As Needed for Allergies.       montelukast (SINGULAIR) 10 MG tablet TAKE 1 TABLET EVERY NIGHT 90 tablet 3        Medicines:  Current Facility-Administered Medications   Medication Dose Route Frequency Provider Last Rate Last Admin    albuterol (PROVENTIL) nebulizer solution 0.083% 2.5 mg/3mL  2.5 mg Nebulization Q6H PRN Garrett Comer MD        ALPRAZolam (XANAX) tablet 0.25 mg  0.25 mg Oral Nightly PRN Blayne Zabala MD   0.25 mg at 05/27/25 2058    aspirin EC tablet 81 mg  81 mg Oral Daily Blayne Zabala MD    81 mg at 05/27/25 0756    atorvastatin (LIPITOR) tablet 10 mg  10 mg Oral Daily Blayne Zabala MD   10 mg at 05/27/25 0758    sennosides-docusate (PERICOLACE) 8.6-50 MG per tablet 2 tablet  2 tablet Oral BID PRN Blayne Zabala MD   2 tablet at 05/25/25 0810    And    polyethylene glycol (MIRALAX) packet 17 g  17 g Oral Daily PRN Blayne Zabala MD        And    bisacodyl (DULCOLAX) EC tablet 5 mg  5 mg Oral Daily PRN Blayne Zabala MD   5 mg at 05/28/25 0634    And    bisacodyl (DULCOLAX) suppository 10 mg  10 mg Rectal Daily PRN Blayne Zabala MD        calcitriol (ROCALTROL) capsule 0.5 mcg  0.5 mcg Oral Daily Blayne Zabala MD   0.5 mcg at 05/27/25 1210    carvedilol (COREG) tablet 25 mg  25 mg Oral BID With Meals Blayne Zabala MD   25 mg at 05/25/25 0810    cloNIDine (CATAPRES) tablet 0.3 mg  0.3 mg Oral TID Blayne Zabala MD   0.3 mg at 05/27/25 2058    clopidogrel (PLAVIX) tablet 75 mg  75 mg Oral Daily Blayne Zabala MD   75 mg at 05/27/25 0759    epoetin cecile-epbx (RETACRIT) 5,000 Units 2 mL injection  5,000 Units Subcutaneous Once per day on Monday Wednesday Friday Garrett Comer MD   5,000 Units at 05/26/25 0925    furosemide (LASIX) injection 20 mg  20 mg Intravenous Once Scott Johnson MD        gabapentin (NEURONTIN) capsule 300 mg  300 mg Oral Nightly Garrett Comer MD   300 mg at 05/27/25 1958    heparin (porcine) injection 3,200 Units  3,200 Units Intracatheter PRN Twan Quiroz MD   3,200 Units at 05/26/25 1400    heparin (porcine) injection 3,600 Units  3,600 Units Intravenous Once Twan Quiroz MD        hydrALAZINE (APRESOLINE) injection 10 mg  10 mg Intravenous Q6H PRN Blayne Zabala MD   10 mg at 05/22/25 0654    hydrALAZINE (APRESOLINE) tablet 100 mg  100 mg Oral TID VjBlayne bernard MD   100 mg at 05/27/25 2058    ipratropium-albuterol (DUO-NEB) nebulizer solution 3 mL  3 mL Nebulization TID - RT Garrett Comer MD        iron polysaccharides  (NIFEREX) capsule 150 mg  150 mg Oral Daily Garrett Comer MD   150 mg at 05/27/25 0757    isosorbide dinitrate (ISORDIL) tablet 10 mg  10 mg Oral TID - Nitrates Blayne Zabala MD   10 mg at 05/25/25 1827    levothyroxine (SYNTHROID, LEVOTHROID) tablet 100 mcg  100 mcg Oral Q AM Blayne Zabala MD   100 mcg at 05/28/25 0411    montelukast (SINGULAIR) tablet 10 mg  10 mg Oral Nightly Blayne Zabala MD   10 mg at 05/27/25 1958    Morphine sulfate (PF) injection 1 mg  1 mg Intravenous Q4H PRN Garrett Comer MD   1 mg at 05/28/25 0634    NIFEdipine XL (PROCARDIA XL) 24 hr tablet 90 mg  90 mg Oral Daily Garrett Comer MD        nitroglycerin (NITROSTAT) SL tablet 0.4 mg  0.4 mg Sublingual Q5 Min PRN Blayne Zabala MD        oxyCODONE-acetaminophen (PERCOCET) 5-325 MG per tablet 1 tablet  1 tablet Oral Q4H PRN Garrett Comer MD   1 tablet at 05/28/25 0910    pantoprazole (PROTONIX) EC tablet 40 mg  40 mg Oral Q AM Blayne Zabala MD   40 mg at 05/28/25 0411    prochlorperazine (COMPAZINE) injection 5 mg  5 mg Intravenous Q6H PRN Pancho Camacho MD   5 mg at 05/28/25 0634    Or    prochlorperazine (COMPAZINE) tablet 5 mg  5 mg Oral Q6H PRN Pancho Camacho MD        Or    prochlorperazine (COMPAZINE) suppository 25 mg  25 mg Rectal Q12H PRN Pancho Camacho MD        promethazine (PHENERGAN) 12.5 mg in sodium chloride 0.9 % 50 mL  12.5 mg Intravenous Q6H PRN Pancho Camacho MD        sodium chloride 0.9 % flush 10 mL  10 mL Intravenous PRN Blayne Zabala MD        sodium chloride 0.9 % flush 10 mL  10 mL Intravenous Q12H Blayne Zabala MD   10 mL at 05/27/25 2246    sodium chloride 0.9 % flush 10 mL  10 mL Intravenous PRN Blayne Zabala MD        sodium chloride 0.9 % flush 10 mL  10 mL Intravenous Q12H Blayne Zabala MD   10 mL at 05/27/25 0800    sodium chloride 0.9 % flush 10 mL  10 mL Intravenous PRN Blayne Zabala MD        sodium chloride 0.9 % infusion 40 mL  40 mL Intravenous PRN  Blayne Zabala MD        sodium chloride 0.9 % infusion 40 mL  40 mL Intravenous PRN Blayne Zabala MD        spironolactone (ALDACTONE) tablet 25 mg  25 mg Oral Daily Blayne Zabala MD   25 mg at 05/27/25 0759    tamsulosin (FLOMAX) 24 hr capsule 0.4 mg  0.4 mg Oral Nightly Blayne Zabala MD   0.4 mg at 05/27/25 1958    valsartan (DIOVAN) tablet 320 mg  320 mg Oral Daily Blayne Zabala MD   320 mg at 05/27/25 0756       Past Medical History:  Past Medical History:   Diagnosis Date    3-vessel CAD 08/11/2020    Allergic rhinitis     Anemia     Anxiety disorder 04/27/2020    Arthritis     Asymmetrical sensorineural hearing loss 06/28/2017    Atherosclerosis of native artery of both lower extremities with intermittent claudication 07/18/2019    Avascular necrosis of femoral head, left 07/11/2020    right hip after surgery    Carotid stenosis     Chronic mucoid otitis media     Chronic rhinitis     COPD (chronic obstructive pulmonary disease)     Coronary artery disease     HEART BYPASS 2004    Crohn's disease of large intestine with other complication 07/30/2020    Chronic diarrhea Colonoscopy July 2020 revealed mild patchy scattered hemosiderin staining with inflammation more so in rectosigmoid area.  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    Iron deficiency anemia 07/14/2020    Mixed hearing loss of left ear     PAD (peripheral artery disease) 08/11/2020    Perianal abscess     Pernicious anemia 08/17/2020    took shots but never diagnosed with b12 deficiency    Personal history of alcoholism 08/11/2020    quit drinking in 2013    Prostatic  hypertrophy 08/11/2020    Sensorineural hearing loss     Sepsis with acute renal failure 09/15/2020    Shortness of breath 05/27/2021    Tinnitus     Ventricular tachycardia, nonsustained 07/14/2020    Weight loss 07/11/2020       Past Surgical History:  Past Surgical History:   Procedure Laterality Date    AORTOGRAM Right 4/25/2025    Procedure: RIGHT LOWER EXTREMITY ANGIOGRAM, SHOCKWAVE LITHOTRIPSY, BALLOON ANGIOPLASTY, MYNX CLOSURE;  Surgeon: Gil Pineda DO;  Location: [facility] HYBRID OR;  Service: Vascular;  Laterality: Right;    AORTOGRAM Left 5/22/2025    Procedure: LEFT LOWER EXTREMITY ANGIOGRAM, SHOCKWAVE LITHOTRIPSY, BALLOON ANGIOPLASTY, STENT PLACEMENT, MYNX CLOSURE;  Surgeon: Blayne Zabala MD;  Location: [facility] HYBRID OR;  Service: Vascular;  Laterality: Left;    ARTERY SURGERY  2021    right side on neck    CAROTID ENDARTERECTOMY Right 05/10/2021    Procedure: RIGHT CAROTID ENDARTERECTOMY WITH EEG;  Surgeon: Gil Pineda DO;  Location: [facility] HYBRID OR 12;  Service: Vascular;  Laterality: Right;    COLONOSCOPY N/A 07/02/2020    Procedure: COLONOSCOPY WITH ANESTHESIA;  Surgeon: Adrien Brewster MD;  Location: [facility] ENDOSCOPY;  Service: Gastroenterology;  Laterality: N/A;  pre op: diarrhea  post op: polyps  PCP: Joe Velasco MD    COLONOSCOPY N/A 10/13/2020    Procedure: COLONOSCOPY WITH ANESTHESIA;  Surgeon: Adrien Brewster MD;  Location: [facility] ENDOSCOPY;  Service: Gastroenterology;  Laterality: N/A;  Pre: Chronic Diarrhea, Crohn's  Post: AVM  Dr. Neftali Velasco  CO2 Inflation Used    COLONOSCOPY N/A 12/08/2023    Procedure: COLONOSCOPY WITH ANESTHESIA;  Surgeon: Adrien Brewster MD;  Location: [facility] ENDOSCOPY;  Service: Gastroenterology;  Laterality: N/A;  pre chrone's disease  post sub optimal prep, polyp, chrone's      CORONARY ARTERY BYPASS GRAFT  2003    x3    ENDOSCOPY N/A 11/02/2021    Procedure: ESOPHAGOGASTRODUODENOSCOPY WITH ANESTHESIA;  Surgeon: Ray  Bridger YUN MD;  Location: [facility] ENDOSCOPY;  Service: Gastroenterology;  Laterality: N/A;  pre anemia;gi bleed  post  gi bleed;fabian Velasco    ENDOSCOPY N/A 10/10/2023    Procedure: ESOPHAGOGASTRODUODENOSCOPY WITH ANESTHESIA;  Surgeon: Adrien Brewster MD;  Location: [facility] ENDOSCOPY;  Service: Gastroenterology;  Laterality: N/A;  preop; anemia  postop esophagitis ; R/O barretts   PCP Randall Beata    EYE SURGERY Bilateral     catorac    INCISION AND DRAINAGE PERIRECTAL ABSCESS N/A 2017    Procedure: INCISION AND DRAINAGE OF JEET ANAL ABSCESS;  Surgeon: Lynette Smith MD;  Location: [facility] OR;  Service:     INGUINAL HERNIA REPAIR Bilateral 2023    Procedure: INGUINAL HERNIA BILATERAL REPAIR LAPAROSCOPIC WITH DAVINCI ROBOT WITH MESH;  Surgeon: Tahira Rivera MD;  Location: [facility] OR;  Service: Robotics - DaVinci;  Laterality: Bilateral;    INSERTION HEMODIALYSIS CATHETER N/A 2025    Procedure: HEMODIALYSIS CATHETER PLACEMENT;  Surgeon: Gil Pineda DO;  Location: [facility] HYBRID OR;  Service: Vascular;  Laterality: N/A;    MYRINGOTOMY W/ TUBES Left 2017    06/10/2016    TONSILLECTOMY      TOTAL HIP ARTHROPLASTY Right        Family History  Family History   Problem Relation Age of Onset    Breast cancer Mother     Dementia Father     Glaucoma Father     No Known Problems Daughter     Colon polyps Neg Hx     Colon cancer Neg Hx        Social History  Social History     Socioeconomic History    Marital status:    Tobacco Use    Smoking status: Former     Current packs/day: 0.00     Average packs/day: 0.5 packs/day for 25.8 years (12.9 ttl pk-yrs)     Types: Cigarettes     Start date:      Quit date: 10/13/2013     Years since quittin.6     Passive exposure: Past    Smokeless tobacco: Never    Tobacco comments:     quit 2013   Vaping Use    Vaping status: Former    Substances: Nicotine    Devices: Pre-filled or refillable cartridge   Substance and  Sexual Activity    Alcohol use: Not Currently    Drug use: No    Sexual activity: Not Currently     Partners: Female       Review of Systems:  History obtained from chart review and the patient  General ROS: No fever or chills  Respiratory ROS: No cough, shortness of breath, wheezing  Cardiovascular ROS: No chest pain or palpitations  Gastrointestinal ROS: No abdominal pain or melena  Genito-Urinary ROS: No dysuria or hematuria  Psych ROS: No anxiety and depression  14 point ROS reviewed with the patient and negative except as noted above and in the HPI unless unable to obtain.    Objective:  Patient Vitals for the past 24 hrs:   BP Temp Temp src Pulse Resp SpO2 Weight   05/28/25 0730 155/62 98.6 °F (37 °C) Oral 75 16 91 % --   05/28/25 0500 -- -- -- -- -- -- 61.7 kg (136 lb 0.4 oz)   05/28/25 0321 166/55 98.5 °F (36.9 °C) Oral 75 16 97 % --   05/28/25 0250 -- -- -- -- -- 92 % --   05/27/25 2323 177/74 99.1 °F (37.3 °C) Oral 77 16 93 % --   05/27/25 2022 179/73 99 °F (37.2 °C) Oral 75 18 95 % --   05/27/25 2000 -- -- -- -- -- 98 % --   05/27/25 1836 -- -- -- 73 18 98 % --   05/27/25 1832 -- -- -- 64 17 97 % --   05/27/25 1632 178/42 98.4 °F (36.9 °C) Oral 66 16 98 % --   05/27/25 1446 -- -- -- 60 16 96 % --   05/27/25 1440 -- -- -- 57 16 95 % --   05/27/25 1303 137/41 97.5 °F (36.4 °C) Axillary 63 16 93 % --   05/27/25 1106 -- -- -- 61 16 99 % --   05/27/25 1101 -- -- -- 66 16 98 % --       Intake/Output Summary (Last 24 hours) at 5/28/2025 0936  Last data filed at 5/28/2025 0000  Gross per 24 hour   Intake 750 ml   Output 250 ml   Net 500 ml     General: awake/alert   Chest:  clear to auscultation bilaterally without respiratory distress  CVS: regular rate and rhythm  Abdominal: soft, nontender, positive bowel sounds  Extremities: no cyanosis or edema  Skin: warm and dry without rash      Labs:  Results from last 7 days   Lab Units 05/28/25  0401 05/27/25  0320 05/26/25  0309   WBC 10*3/mm3 8.79 6.87 6.58  "  HEMOGLOBIN g/dL 8.5* 7.9* 8.0*   HEMATOCRIT % 26.7* 24.9* 24.8*   PLATELETS 10*3/mm3 140 113* 85*         Results from last 7 days   Lab Units 05/28/25  0401 05/27/25  0320 05/26/25  0309 05/25/25  0457 05/24/25  0214 05/23/25  0303 05/22/25  1624 05/21/25  1632   SODIUM mmol/L 137 139 136 139   < > 139   < > 137   POTASSIUM mmol/L 4.1 3.8 4.0 3.3*   < > 3.2*   < > 3.1*   CHLORIDE mmol/L 97* 98 97* 96*   < > 99   < > 95*   CO2 mmol/L 27.0 29.0 26.0 26.0   < > 25.0   < > 30.0*   BUN mg/dL 47.5* 31* 62* 43*   < > 35*   < > 14   CREATININE mg/dL 3.92* 2.89* 4.45* 3.75*   < > 3.80*   < > 2.29*   CALCIUM mg/dL 8.9 8.8 8.8 9.3   < > 9.2   < > 8.5*   EGFR mL/min/1.73 15.0* 21.7* 12.9* 15.9*   < > 15.6*   < > 28.7*   BILIRUBIN mg/dL  --   --   --  0.4  --  0.2  --  0.2   ALK PHOS U/L  --   --   --  102  --  116  --  110   ALT (SGPT) U/L  --   --   --  <5  --  5  --  5   AST (SGOT) U/L  --   --   --  20  --  20  --  18   GLUCOSE mg/dL 106* 106* 97 138*   < > 105*   < > 99    < > = values in this interval not displayed.       Radiology:   Imaging Results (Last 72 Hours)       ** No results found for the last 72 hours. **            Culture:  No results found for: \"BLOODCX\", \"URINECX\", \"WOUNDCX\", \"MRSACX\", \"RESPCX\", \"STOOLCX\"      Assessment    End stage renal disease on HD MWF  Hypertension  Anemia of CKD  Peripheral vascular disease--s/p angiogram on 5/22  Thrombocytopenia  Hypokalemia--improved    Plan:   Dialysis today  Noted vascular plans for another attempt at revascularization prior to amputation. Possibly to be done Friday or early next week.      Slava Tate, APRN  5/28/2025  09:36 CDT    "

## 2025-05-28 NOTE — PROGRESS NOTES
[facility] Medicine Services  INPATIENT PROGRESS NOTE    Patient Name: Ricardo Hugo  Date of Admission: 5/21/2025  Today's Date: 05/28/25  Length of Stay: 7  Primary Care Physician: Nathan Zimmer MD    Subjective   Chief Complaint: Peripheral vascular disease/pain/dialysis/anemia/thrombocytopenia   HPI   Blood pressure slight high, currently getting dialysis.  Patient is on room air.Creatinine increased.  White blood cells normal.  Hemoglobin increased.  Platelet count is normalized.    Review of Systems   Constitutional:  Positive for activity change, appetite change and fatigue. Negative for chills and fever.   HENT:  Negative for hearing loss, nosebleeds, tinnitus and trouble swallowing.    Eyes:  Negative for visual disturbance.   Respiratory:  Negative for cough, chest tightness, shortness of breath and wheezing.    Cardiovascular:  Negative for chest pain, palpitations and leg swelling.   Gastrointestinal:  Negative for abdominal distention, abdominal pain, blood in stool, constipation, diarrhea, nausea and vomiting.   Endocrine: Negative for cold intolerance, heat intolerance, polydipsia, polyphagia and polyuria.   Genitourinary:  Negative for decreased urine volume, difficulty urinating, dysuria, flank pain, frequency and hematuria.   Musculoskeletal:  Positive for arthralgias, gait problem and myalgias. Negative for joint swelling.   Skin:  Negative for rash.   Allergic/Immunologic: Negative for immunocompromised state.   Neurological:  Positive for weakness. Negative for dizziness, syncope, light-headedness and headaches.   Hematological:  Negative for adenopathy. Does not bruise/bleed easily.   Psychiatric/Behavioral:  Negative for confusion and sleep disturbance. The patient is not nervous/anxious.   All pertinent negatives and positives are as above. All other systems have been reviewed and are negative unless otherwise stated.     Objective    Temp:  [97.5  °F (36.4 °C)-99.1 °F (37.3 °C)] 98.6 °F (37 °C)  Heart Rate:  [57-77] 75  Resp:  [16-18] 16  BP: (137-179)/(41-74) 155/62  Physical Exam  Vitals and nursing note reviewed.   Constitutional:       Comments: Advanced age.  Cachectic.  Chronically ill.   HENT:      Head: Normocephalic.   Eyes:      Conjunctiva/sclera: Conjunctivae normal.      Pupils: Pupils are equal, round, and reactive to light.   Cardiovascular:      Rate and Rhythm: Normal rate and regular rhythm.      Heart sounds: Normal heart sounds.   Pulmonary:      Effort: No respiratory distress.      Comments: Diminished breath sound bilateral, clear, on room air.  Abdominal:      General: Bowel sounds are normal. There is no distension.      Palpations: Abdomen is soft.      Tenderness: There is no abdominal tenderness.   Musculoskeletal:         General: No swelling.      Cervical back: Neck supple.   Skin:     General: Skin is warm and dry.      Findings: No rash.   Neurological:      Mental Status: He is alert and oriented to person, place, and time.      Motor: Weakness present.      Coordination: Coordination abnormal.      Gait: Gait abnormal.   Psychiatric:         Mood and Affect: Mood normal.         Behavior: Behavior normal.         Thought Content: Thought content normal.          Results Review:  I have reviewed the labs, radiology results, and diagnostic studies.    Laboratory Data:   Results from last 7 days   Lab Units 05/28/25  0401 05/27/25  0320 05/26/25  0309   WBC 10*3/mm3 8.79 6.87 6.58   HEMOGLOBIN g/dL 8.5* 7.9* 8.0*   HEMATOCRIT % 26.7* 24.9* 24.8*   PLATELETS 10*3/mm3 140 113* 85*        Results from last 7 days   Lab Units 05/28/25  0401 05/27/25  0320 05/26/25  0309 05/25/25  0457 05/24/25  0214 05/23/25  0303 05/22/25  1624 05/21/25  1632   SODIUM mmol/L 137 139 136 139   < > 139   < > 137   POTASSIUM mmol/L 4.1 3.8 4.0 3.3*   < > 3.2*   < > 3.1*   CHLORIDE mmol/L 97* 98 97* 96*   < > 99   < > 95*   CO2 mmol/L 27.0 29.0 26.0  "26.0   < > 25.0   < > 30.0*   BUN mg/dL 47.5* 31* 62* 43*   < > 35*   < > 14   CREATININE mg/dL 3.92* 2.89* 4.45* 3.75*   < > 3.80*   < > 2.29*   CALCIUM mg/dL 8.9 8.8 8.8 9.3   < > 9.2   < > 8.5*   BILIRUBIN mg/dL  --   --   --  0.4  --  0.2  --  0.2   ALK PHOS U/L  --   --   --  102  --  116  --  110   ALT (SGPT) U/L  --   --   --  <5  --  5  --  5   AST (SGOT) U/L  --   --   --  20  --  20  --  18   GLUCOSE mg/dL 106* 106* 97 138*   < > 105*   < > 99    < > = values in this interval not displayed.       Culture Data:   No results found for: \"BLOODCX\", \"URINECX\", \"WOUNDCX\", \"MRSACX\", \"RESPCX\", \"STOOLCX\"    Radiology Data:   Imaging Results (Last 24 Hours)       ** No results found for the last 24 hours. **            I have reviewed the patient's current medications.     Assessment/Plan   Assessment  Active Hospital Problems    Diagnosis     **Arterial occlusion     Thrombocytopenia     Acute pain of left lower extremity     ESRD (end stage renal disease) on dialysis     Abnormal TSH     Chronic diastolic (congestive) heart failure     Anticoagulated     Sensorineural hearing loss (SNHL), bilateral     Eustachian tube dysfunction, bilateral     Nasal septal deviation     Mixed hyperlipidemia     CLL (chronic lymphocytic leukemia)     Anemia due to chronic kidney disease     Diastolic dysfunction     Symptomatic anemia     Resistant hypertension     Peripheral arterial disease     IHD (ischemic heart disease)     Essential hypertension     Chronic rhinitis        Treatment Plan  Acute ischemic left lower extremity /left leg pain.  Vascular consult.  Status post surgery 5/22/2025-Right common femoral artery access under ultrasound guidance, left posterior tibial artery access ultrasound guidance, Left lower extremity arteriogram with third order selection angiographic interpretation, PTA of the left SFA with a 3 x 60 Tammy cross balloon, Shockwave lithotripsy of the left external iliac artery with a 6 x 60 shockwave " balloon, Left external iliac artery stenting with a 8 x 60 ever flex stent postdilated with a 7 x 60 Ever Cross balloon  Vascular-plan for left above-the-knee amputation next week.     End-stage renal disease.  Dialysis patient.  Calcitriol . Creatinine increased.  Dialysis Monday Wednesday Friday.     Hypokalemia.  Resolved. Magnesium-normal.     Anemia. Status post 1 unit blood transfusion 5/24/25.  Hemoglobin increased.  Niferex . Procrit.     Thrombocytopenia.  Platelet normalized     CAD/hypertension/hyperlipidemia/CHF.  Aspirin . Lipitor.  Coreg . Catapres.  Plavix.  Procardia.  Hydralazine . Isordil . Aldactone.  Diovan . hydralazine as needed.  Nitro as needed.  Echocardiogram 1/11/2025- 56 - 60%,  mild septal asymmetric hypertrophy, diastolic function is consistent with (grade II w/high LAP) pseudonormalization, Normal right ventricular cavity size and systolic function, left atrial cavity is mild to moderately dilated, right atrial cavity is dilated, Mild aortic valve stenosis, Mild to moderate mitral valve regurgitation, Moderate to severe tricuspid valve regurgitation,  tricuspid regurgitation is markedly elevated (>55 mmHg).     COPD.  Not exacerbation.  DuoNebs.  Singulair.  Hypothyroidism.  Synthroid.  Incentive spirometer.  Patient is on room air.     Reflux . Protonix.  Compazine as needed.  Phenergan as needed.     Prostate hypertrophy.  Flomax.     Anxiety/depression.  Xanax as needed.     Pain.  Morphine as needed.  Percocet as needed.  Neurontin at Night.    Nutrition . Diabetic diet.   Boost supplement.  Fortified pudding.     Deconditioning.  PT consult.     Medical Decision Making  Number and Complexity of problems: Ischemic left leg/end-stage renal disease/hypokalemia/anemia/thrombocytopenia/CAD/CHF/COPD  Differential Diagnosis: None     Conditions and Status        Condition is unchanged.     [facility]  External documents reviewed: Previous note .  Cardiac tracing (EKG, telemetry)  interpretation: No monitor  Radiology interpretation: Echo  Labs reviewed: Laboratory  Any tests that were considered but not ordered: Lab in a.m.     Decision rules/scores evaluated (example [id number], Wells, etc): None     Discussed with: Patient and daughter     Care Planning  Shared decision making: Patient and daughter  Code status and discussions: Full code     Disposition  Social Determinants of Health that impact treatment or disposition: From home  Patient probably need rehab placement.  2 to 5 days.         Electronically signed by Garrett Comer MD, 05/28/25, 09:17 CDT.

## 2025-05-28 NOTE — PROGRESS NOTES
LOS: 6 days   Patient Care Team:  Nathan Zimmer MD as PCP - General (Internal Medicine)  Adrien Brewster MD as Consulting Physician (Gastroenterology)  Eleuterio Farley MD (Inactive) as Cardiologist (Cardiology)  Elena Jon APRN as Referring Physician (Vascular Surgery)  Bolivar Rueda MD as Consulting Physician (Nephrology)  Slava Tate APRN as Nurse Practitioner (Family Medicine)  New Omalley MD as Consulting Physician (Pulmonary Disease)  Becky Camacho APRN as Nurse Practitioner (Hematology and Oncology)  Gil Pineda DO as Consulting Physician (Vascular Surgery)    Chief Complaint: Left foot pain    Subjective     Pt seen/examined with complaints of continued rest pain. BP elevated. Afebrile. Family at bedside    Objective     Vital Signs  Temp:  [97.5 °F (36.4 °C)-99 °F (37.2 °C)] 99 °F (37.2 °C)  Heart Rate:  [57-75] 75  Resp:  [16-18] 18  BP: (137-179)/(41-73) 179/73    Physical Exam  Vitals and nursing note reviewed.   Constitutional:       Appearance: Normal appearance. He is well-developed.   HENT:      Head: Normocephalic and atraumatic.   Eyes:      General: No scleral icterus.     Pupils: Pupils are equal, round, and reactive to light.   Neck:      Thyroid: No thyromegaly.   Cardiovascular:      Rate and Rhythm: Normal rate and regular rhythm.      Heart sounds: Normal heart sounds.      Comments: Discoloration to left foot,   Pulmonary:      Effort: Pulmonary effort is normal. No respiratory distress.      Breath sounds: Normal breath sounds.   Abdominal:      General: Bowel sounds are normal.      Palpations: Abdomen is soft.   Musculoskeletal:         General: Normal range of motion.      Cervical back: Normal range of motion and neck supple.   Skin:     General: Skin is warm and dry.   Neurological:      Mental Status: He is alert and oriented to person, place, and time.   Psychiatric:         Behavior: Behavior normal.          Thought Content: Thought content normal.         Judgment: Judgment normal.         Laboratory Data:   Results from last 7 days   Lab Units 05/27/25  0320 05/26/25  0309 05/25/25 0457   WBC 10*3/mm3 6.87 6.58 8.92   HEMOGLOBIN g/dL 7.9* 8.0* 8.5*   HEMATOCRIT % 24.9* 24.8* 26.3*   PLATELETS 10*3/mm3 113* 85* 113*       Results from last 7 days   Lab Units 05/27/25  0320 05/26/25  0309 05/25/25  0457 05/24/25  0214 05/23/25  0303 05/22/25  1624 05/21/25  1632   SODIUM mmol/L 139 136 139   < > 139   < > 137   POTASSIUM mmol/L 3.8 4.0 3.3*   < > 3.2*   < > 3.1*   CHLORIDE mmol/L 98 97* 96*   < > 99   < > 95*   CO2 mmol/L 29.0 26.0 26.0   < > 25.0   < > 30.0*   BUN mg/dL 31* 62* 43*   < > 35*   < > 14   CREATININE mg/dL 2.89* 4.45* 3.75*   < > 3.80*   < > 2.29*   CALCIUM mg/dL 8.8 8.8 9.3   < > 9.2   < > 8.5*   BILIRUBIN mg/dL  --   --  0.4  --  0.2  --  0.2   ALK PHOS U/L  --   --  102  --  116  --  110   ALT (SGPT) U/L  --   --  <5  --  5  --  5   AST (SGOT) U/L  --   --  20  --  20  --  18   GLUCOSE mg/dL 106* 97 138*   < > 105*   < > 99    < > = values in this interval not displayed.     Results from last 7 days   Lab Units 05/21/25  2323 05/21/25  1911 05/21/25  1632   PROTIME Seconds  --   --  16.4*   INR   --   --  1.25*   APTT seconds 71.0* 31.3 32.7            Medication Review:     Assessment & Plan       Arterial occlusion    Chronic rhinitis    Essential hypertension    Resistant hypertension    Symptomatic anemia    Peripheral arterial disease    IHD (ischemic heart disease)    Diastolic dysfunction    Anemia due to chronic kidney disease    CLL (chronic lymphocytic leukemia)    Mixed hyperlipidemia    Eustachian tube dysfunction, bilateral    Sensorineural hearing loss (SNHL), bilateral    Anticoagulated    Nasal septal deviation    Chronic diastolic (congestive) heart failure    Abnormal TSH    ESRD (end stage renal disease) on dialysis    Acute pain of left lower extremity     Thrombocytopenia      Plan:   Dr. Pineda planning attempt at revascularization prior to amputation.  This will potentially be Friday or early next week.  We will continue to monitor with further plans.    STERLING Murray  Vascular Surgery  [phone]  05/27/25  21:03 CDT

## 2025-05-28 NOTE — CASE MANAGEMENT/SOCIAL WORK
Continued Stay Note  NANDINI Oneill     Patient Name: Ricardo Hugo  MRN: [id number]  Today's Date: 5/28/2025    Admit Date: 5/21/2025    Plan: Rehab   Discharge Plan       Row Name 05/28/25 1458       Plan    Plan Rehab    Patient/Family in Agreement with Plan yes    Plan Comments Noted pt will be going to OR later in the week or early next week. Will follow after surgery for possible rehab.                   Discharge Codes    No documentation.                 Expected Discharge Date and Time       Expected Discharge Date Expected Discharge Time    May 29, 2025               LINNETTE Ramos

## 2025-05-28 NOTE — NURSING NOTE
[facility] [place] [facility]  DIALYSIS TREATMENT SUMMARY     Note: Consult with the attending physician for patient treatment orders, this document is not a physician order.     Patient Information  Patient Ricardo Hugo  Date of Birth February 22, 1950  Chart Number [id number]  Location [facility]  Location MRN [id number]  Gender Male  SSN (last 4)   Treatment Information  Treatment Type Hemodialysis  Treatment Id [id number]  Start Time May 28, 2025 08:25  End Time May 28, 2025 12:00  Acutal Duration 03:35  Treatment Balances  Total Saline Administered 500  Net Fluid Balance -2000  Hemodialysis Orders  Therapy Standard  Orders Verified Time 05/28/2025 08:23   Date Verified 05/28/2025  Duration 3:30  Isolated UF/Bypass No  BFR (mL) 450  DFR (mL) 700  Dialyzer Type OPTIFLUX 160NR  UF Order UF Goal Ordered  UF Goal Ordered (mL) 2000  Crit-Line used No  Heparin Initial Units Bolus No  Heparin IV Maintenance Bolus No  Heparin IV Infusion No  Potassium (mEq/L) 3  Calcium (mEq/L) 2.5  Bicarb (mg/dL) 35  Sodium (mEq/L) 140  Clinician Ginny Cancino RN  Dialysis Access  Access Type Central Venous Catheter  Central Venous Catheter  Access Type Catheter - Tunneled  Access Location Chest Wall - R  Current/New [facility] Patient Yes  1st Use Catheter Verified by Previous Use  Catheter Care Completed per Policy Yes  Dressing Dry and Intact on Arrival Yes  Dressing Changed Yes  Type of Dressing Film Biopatch/CHG  Dressing Changed By [facility] Staff  Type of HD Caps Curos  HD Caps Changed Yes  CVC Line Education Provided Yes - Infection Prevention  Vitals  Pre-Treatment Vitals  Time Is BP being recorded? Pre BP Map BP Method Pulse RR Temp How was Weight Obtained? Pre Weight Previous Dry Weight Previous Post Weight Metric Target Fluid Removal (mL) Dialysate Confirmed Clinician  05/28/2025 08:14 BP/Map 154/97 (116) Noninvasive 85 18 97 How Obtained: Reported Floor Weight 61.7   Kgs 2000 Yes Ginny Cancino  RN  Comments: Received pt from floor in stable condition via transport  Intra Procedure Vitals  Rec Type Time Is BP being recorded? BP Map Pulse RR BFR DFR AP  TMP UFR RMVD Bolus NSS Flush Chills Safety Check Crit-Line HCT Crit-Line BV% Clinician  E 05/28/2025 08:25 BP/Map 193/72 (112) 82 18 450 700 -150 90 90 860 0    Yes   Ginny Cancino RN  Comments: HD initiated without difficulty per right IJ permcath  E 05/28/2025 08:40 BP/Map 152/55 (87) 73 18 450 700 -260 90 90 860 120    Yes   Ginny Cancino RN  Comments: Re-positioned for comfort  E 05/28/2025 09:00 BP/Map 165/71 (102) 82 18 450 700 -150 90 90 860 410    Yes   Ginny Cancino RN  Comments: Floor nurse called to bring pain med per pt request  E 05/28/2025 09:30 BP/Map 155/52 (86) 81 18 450 700 -180 100 90 640 960    Yes   Ginny Cancino RN  Comments: Resting quietly  E 05/28/2025 10:00 BP/Map 139/54 (82) 83 18 450 700 -180 140 70 640 1297    Yes   Ginny Cancino RN  Comments: Resting quietly in bed with eyes closed, respirations even and unlabored  E 05/28/2025 10:30 BP/Map 122/55 (77) 75 16 450 700 -180 140 70 640 1641    Yes   Clare Fabian  Comments:   E 05/28/2025 11:00 BP/Map 107/41 (63) 69 16 300 700 -200 140 50 650 2170    Yes   Clare Fabian  Comments:   E 05/28/2025 11:30 BP/Map 127/43 (71) 55 16 300 700 -170 90 30 650     Elsa Fabian  Comments:   E 05/28/2025 12:00 BP/Map 103/47 (66) 55 16           Yes   Ginny Cancino RN  Comments: HD completed, rinseback initiated  Post Treatment Vitals  Time Is BP being recorded? BP Map Pulse RR Temp How was Weight Obtained? Post Weight Metric BVP UF Goal Ordered NSS Given Intra-Procedure Total Machine UF Removed (mL) Crit-Line Ending Profile Crit-Line Refill Crit-Line Ending HCT Crit-Line Max BV% Clinician  05/28/2025 12:15 BP/Map 135/53 (80) 71 18 97 How Obtained: Reported Floor Weight 59.7 Kgs 81.6 2000 0 2500     Ginny Cancino RN  Comments: Ports flushed and capped  Safety checks include: access  uncovered and secured, Hemaclip secured for all central line access, machine checks performed, and alarm limits confirmed.  Labs  Hepatitis  HBsAG Lab Result HBsAG Lab Result HBsAG Draw Date Transient Antigenemia(MD Diagnosis Only) Anti-HBs Lab Result Anti-HBs Lab Value Anti-HBs Draw Date Documented By Documented Date Hepatitis Status Hepatitis Status  Negative  05/05/2025  Negative  04/30/2025 Ginny Cancino 05/28/2025  Susceptible  Notes:   Pre-Treatment Hepatitis Precautions Copy of hepatitis results verified in hospital EMR Yes Signed By Ginny Cancino RN  Patient tx outside buffer zone Yes Hepatitis Information Entered By Ginny Cancino RN   Hard copy verified by nurse and placed in patient’s hospital chart Yes Signing   Pre-Treatment Handoff  Staff Report Received Yes  Report Given by Primary Nurse Addie Montoya  Time 07:30  Patient Arrival  Patient ID Verified Date of Birth  Full Name  Patient Consent to treatment verified Yes  Blood Transfusion Consent Verified N/A  Treatment Comments  Treatment Notes Tolerated treatment well, transported back to pt room in stable condition via transporter  Post-Treatment Handoff  Report Given to Primary Nurse dAdie Montoya RN  Time Report Given 12:22  Report Given By Ginny Cancino RN  Machine Validation  Time 08:00  Date 5/28/2025  Auto Alarm Test Passed Yes  Machine Serial # [id number]  Portable RO Yes  RO Serial# 18  Residual Bleach Negative Yes  Was a manufactured mix used? Yes  Machine Log Completed Yes  Total Chlorine (less than 0.1)? Yes  Total Chlorine Log Completed Yes  Bicarb BiBag  Bibag Size 900  Machine Temp 36.6  Machine Conductivity 13.8  Meter Type N/A  Meter Conductivity   Independent Conductivity 14  pH Status Pass Pass  pH   TCD Value   TCD Alarm with +/- 0.5 Yes  NVL enabled validated 100 asymmetric Yes  Safety check complete Ginny Cancino RN  Second Verification Performed? Yes  Second Verification Performed By Ginny Cancino RN  Reason Not  Verified   Serum Lab Values  Time BUN Creatinine Na K (mEq/L) Cl CO2 Ca (mEq/L) Phos Mg (mg/dL) Alb (g/dL) Glucose Hgb Hct WBC Plt PT aPTT INR Other Clinician  05/28/2025 04:01 47.5 3.9 137 4.1  27 8.9     8.5 26.7 8.7 140     Ginny Cancino RN  Comments:   Facility Information Room # 389 Diagnosis Anemia, multifactorial to include chronic kidney disease, iron deficiency and possible bleed  Facility Information Bed # 5 Isolation Information  Admission Date 05/21/2025 Patient Type New patient to dialysis with diagnosis of ESRD Isolation Required? N  Ordering MD [facility] Patient Chronic Unit [facility] Completed by  Account/Finance Number [id number] Patient Home Unit [facility] information Entered by Ginny Cancino RN  Admission Status InPatient Code Status Full Code   Location Dialysis Suite Multi Diagnosis   Start Treatment Time Out Confirmed by MAHENDRA Cancino RN Correct access site verified Yes  Treatment Initiation Connections Secured Time Out Completed 08:14 Treatment Start Date 05/28/2025  Saline line double clamped Correct patient verified Yes Treatment Start Time 08:25  Hemaclip Applied Correct procedure verified Yes Patient/Family questions and concerns addressed Yes  Pre Focused Assessment Edema GI / Bowels  Access Location None GI Soft  Signs and Symptoms of Infection? No Cardiac   Pain Screening Heart Rhythm Irregular  Voids  Does the patient have pain? No Telemetry Yes Completed by  Respiratory Skin Pre Treatment Focused Assessment Completed By Ginny Cancino RN  Lung Sounds Diminished Skin Warm Time 08:14  Location Bases  Dry Signing  Position Anterior LOC Signed By Ginny Cancino RN  Respiratory Efforts Unlabored LOC Alert and Oriented x3   Education Focus or Topic Treatment Options Caregiver Education Provided? N/A  Patient Education Method Knowledge / Understanding Assessed Teach back Patient Education Reinforced By  Patient Educated? Yes Family Education Provided? N/A Patient Education  Reinforced by Ginny Cancino RN  Post-Treatment HD machine external disinfection completed per policy Yes Completed by  Post Treatment Delay PRO external disinfection completed per policy Yes Post Treatment Form Completed By Ginny Cancino RN  Delay N Post Treatment General Information   Machine Disinfection Requirement Notes Verbal handoff completed with floor nurse   Post Focused Assessment Lung Sounds Diminished LOC Alert and Oriented x3  Changes from Pre Focused Assessment Location Bases GI / Bowels  Changes from Pre Treatment Focused Assessment? No Position Anterior GI Soft  Access Respiratory Efforts Unlabored   Cath Packed with heparin 1000 units/cc Edema  Voids  Access Port(ml) 1.6 Location None Completed by  Return Port(ml) 1.6 Cardiac Post Treatment Focused Assessment Completed by Ginny Cancino RN  Catheter clamped and capped Yes Heart Rhythm Irregular Date 05/28/2025  Access Flow Good Telemetry Yes Time 12:16  Dialyzer Clearance Clear Skin Signing  Pain Screening Skin Warm Signed By Ginny Cancino RN  Does the patient have pain? No  Dry   Respiratory LOC

## 2025-05-28 NOTE — PLAN OF CARE
Goal Outcome Evaluation:  Plan of Care Reviewed With: patient, spouse        Progress: no change  Outcome Evaluation: Dialysis today, 2L taken off; PRN pain meds given in Dialysis per pt request; returned to floor just after lunch; spouse at bedside; pt has been very lethargic since arrival back from dialysis, unable to eat lunch; encouraged to keep legs elevated; IID; voiding at times; safety maintained.

## 2025-05-29 ENCOUNTER — APPOINTMENT (OUTPATIENT)
Dept: GENERAL RADIOLOGY | Facility: HOSPITAL | Age: 75
End: 2025-05-29
Payer: MEDICARE

## 2025-05-29 LAB
ABO GROUP BLD: NORMAL
ANION GAP SERPL CALCULATED.3IONS-SCNC: 11 MMOL/L (ref 5–15)
BLD GP AB SCN SERPL QL: NEGATIVE
BUN SERPL-MCNC: 39.3 MG/DL (ref 8–23)
BUN/CREAT SERPL: 13.3 (ref 7–25)
CALCIUM SPEC-SCNC: 8.4 MG/DL (ref 8.6–10.5)
CHLORIDE SERPL-SCNC: 97 MMOL/L (ref 98–107)
CO2 SERPL-SCNC: 26 MMOL/L (ref 22–29)
CREAT SERPL-MCNC: 2.96 MG/DL (ref 0.76–1.27)
DEPRECATED RDW RBC AUTO: 68.3 FL (ref 37–54)
EGFRCR SERPLBLD CKD-EPI 2021: 21.1 ML/MIN/1.73
ERYTHROCYTE [DISTWIDTH] IN BLOOD BY AUTOMATED COUNT: 18.3 % (ref 12.3–15.4)
GLUCOSE SERPL-MCNC: 120 MG/DL (ref 65–99)
HCT VFR BLD AUTO: 22 % (ref 37.5–51)
HGB BLD-MCNC: 7 G/DL (ref 13–17.7)
MCH RBC QN AUTO: 33 PG (ref 26.6–33)
MCHC RBC AUTO-ENTMCNC: 31.8 G/DL (ref 31.5–35.7)
MCV RBC AUTO: 103.8 FL (ref 79–97)
PLATELET # BLD AUTO: 108 10*3/MM3 (ref 140–450)
PMV BLD AUTO: 10.2 FL (ref 6–12)
POTASSIUM SERPL-SCNC: 3.9 MMOL/L (ref 3.5–5.2)
RBC # BLD AUTO: 2.12 10*6/MM3 (ref 4.14–5.8)
RH BLD: NEGATIVE
SODIUM SERPL-SCNC: 134 MMOL/L (ref 136–145)
T&S EXPIRATION DATE: NORMAL
WBC NRBC COR # BLD AUTO: 11.2 10*3/MM3 (ref 3.4–10.8)

## 2025-05-29 PROCEDURE — 71045 X-RAY EXAM CHEST 1 VIEW: CPT

## 2025-05-29 PROCEDURE — 86923 COMPATIBILITY TEST ELECTRIC: CPT

## 2025-05-29 PROCEDURE — 99232 SBSQ HOSP IP/OBS MODERATE 35: CPT | Performed by: NURSE PRACTITIONER

## 2025-05-29 PROCEDURE — 85027 COMPLETE CBC AUTOMATED: CPT | Performed by: FAMILY MEDICINE

## 2025-05-29 PROCEDURE — 86900 BLOOD TYPING SEROLOGIC ABO: CPT | Performed by: FAMILY MEDICINE

## 2025-05-29 PROCEDURE — 80048 BASIC METABOLIC PNL TOTAL CA: CPT | Performed by: FAMILY MEDICINE

## 2025-05-29 PROCEDURE — 25010000002 MORPHINE PER 10 MG: Performed by: FAMILY MEDICINE

## 2025-05-29 PROCEDURE — 25010000002 PIPERACILLIN SOD-TAZOBACTAM PER 1 G: Performed by: FAMILY MEDICINE

## 2025-05-29 PROCEDURE — 86850 RBC ANTIBODY SCREEN: CPT | Performed by: FAMILY MEDICINE

## 2025-05-29 PROCEDURE — 94799 UNLISTED PULMONARY SVC/PX: CPT

## 2025-05-29 PROCEDURE — 86901 BLOOD TYPING SEROLOGIC RH(D): CPT | Performed by: FAMILY MEDICINE

## 2025-05-29 RX ORDER — CYCLOBENZAPRINE HCL 10 MG
5 TABLET ORAL 3 TIMES DAILY PRN
Status: DISCONTINUED | OUTPATIENT
Start: 2025-05-29 | End: 2025-06-05 | Stop reason: HOSPADM

## 2025-05-29 RX ADMIN — CYCLOBENZAPRINE 5 MG: 10 TABLET, FILM COATED ORAL at 12:51

## 2025-05-29 RX ADMIN — MORPHINE SULFATE 1 MG: 2 INJECTION, SOLUTION INTRAMUSCULAR; INTRAVENOUS at 19:32

## 2025-05-29 RX ADMIN — MONTELUKAST SODIUM 10 MG: 10 TABLET, COATED ORAL at 20:34

## 2025-05-29 RX ADMIN — Medication 10 ML: at 08:29

## 2025-05-29 RX ADMIN — ATORVASTATIN CALCIUM 10 MG: 10 TABLET, FILM COATED ORAL at 08:26

## 2025-05-29 RX ADMIN — Medication 10 ML: at 20:40

## 2025-05-29 RX ADMIN — IPRATROPIUM BROMIDE AND ALBUTEROL SULFATE 3 ML: 2.5; .5 SOLUTION RESPIRATORY (INHALATION) at 19:48

## 2025-05-29 RX ADMIN — GABAPENTIN 300 MG: 300 CAPSULE ORAL at 20:34

## 2025-05-29 RX ADMIN — ASPIRIN 81 MG: 81 TABLET, COATED ORAL at 08:27

## 2025-05-29 RX ADMIN — OXYCODONE HYDROCHLORIDE AND ACETAMINOPHEN 1 TABLET: 5; 325 TABLET ORAL at 13:49

## 2025-05-29 RX ADMIN — IPRATROPIUM BROMIDE AND ALBUTEROL SULFATE 3 ML: 2.5; .5 SOLUTION RESPIRATORY (INHALATION) at 14:22

## 2025-05-29 RX ADMIN — PANTOPRAZOLE SODIUM 40 MG: 40 TABLET, DELAYED RELEASE ORAL at 05:53

## 2025-05-29 RX ADMIN — HYDRALAZINE HYDROCHLORIDE 100 MG: 50 TABLET ORAL at 20:34

## 2025-05-29 RX ADMIN — OXYCODONE HYDROCHLORIDE AND ACETAMINOPHEN 1 TABLET: 5; 325 TABLET ORAL at 09:43

## 2025-05-29 RX ADMIN — MORPHINE SULFATE 1 MG: 2 INJECTION, SOLUTION INTRAMUSCULAR; INTRAVENOUS at 05:53

## 2025-05-29 RX ADMIN — PIPERACILLIN AND TAZOBACTAM 3.38 G: 3; .375 INJECTION, POWDER, FOR SOLUTION INTRAVENOUS at 19:10

## 2025-05-29 RX ADMIN — TAMSULOSIN HYDROCHLORIDE 0.4 MG: 0.4 CAPSULE ORAL at 20:34

## 2025-05-29 RX ADMIN — ALPRAZOLAM 0.25 MG: 0.25 TABLET ORAL at 20:39

## 2025-05-29 RX ADMIN — MORPHINE SULFATE 1 MG: 2 INJECTION, SOLUTION INTRAMUSCULAR; INTRAVENOUS at 10:42

## 2025-05-29 RX ADMIN — LEVOTHYROXINE SODIUM 100 MCG: 0.1 TABLET ORAL at 05:53

## 2025-05-29 RX ADMIN — OXYCODONE HYDROCHLORIDE AND ACETAMINOPHEN 1 TABLET: 5; 325 TABLET ORAL at 17:55

## 2025-05-29 RX ADMIN — MORPHINE SULFATE 1 MG: 2 INJECTION, SOLUTION INTRAMUSCULAR; INTRAVENOUS at 15:31

## 2025-05-29 RX ADMIN — OXYCODONE HYDROCHLORIDE AND ACETAMINOPHEN 1 TABLET: 5; 325 TABLET ORAL at 21:50

## 2025-05-29 RX ADMIN — CALCITRIOL CAPSULES 0.25 MCG 0.5 MCG: 0.25 CAPSULE ORAL at 08:27

## 2025-05-29 RX ADMIN — CLONIDINE HYDROCHLORIDE 0.3 MG: 0.1 TABLET ORAL at 20:34

## 2025-05-29 RX ADMIN — OXYCODONE HYDROCHLORIDE AND ACETAMINOPHEN 1 TABLET: 5; 325 TABLET ORAL at 00:41

## 2025-05-29 RX ADMIN — CLOPIDOGREL BISULFATE 75 MG: 75 TABLET, FILM COATED ORAL at 08:28

## 2025-05-29 RX ADMIN — Medication 150 MG: at 08:26

## 2025-05-29 NOTE — PLAN OF CARE
Goal Outcome Evaluation:              Outcome Evaluation: Pt in room with family and respiratory therapist, receiving breathing treatments. NPFE could not be performed at this time. Pt reports good appetite and states he is enjoying the food options while inpatient. Average intake over the last 72 hr: 83%; PO fluids 1170 mL/day. Pt is also drinking Boost consistently. Pt denies any N/V, diarrhea, or problems chewing/swallowing. Scheduled for left lower extremity angiogram tomorrow to help revascularize extremity. Tolerating HD well. Per nurse notes, pt sometimes has episodes and lethargy. New labs: Na 134, BUN 39.3, Creat 2.96, Glu 120. Potassium range over the last 72 hr: 3.8-3.9. No new phosphorus labs since 5/20. No need for pt to be on a renal diet at this time. Last BM 5/26. Wt has fluctuated while inpatient, likely related to fluid shifts and HD-- wt loss is hard to assess. Dietitian will monitor electrolytes and will try to visit again during follow-up.

## 2025-05-29 NOTE — PROGRESS NOTES
"Pharmacy Dosing Service  Antimicrobial Dosing  Zosyn    Assessment/Action/Plan:  Based on indication and renal function, Zosyn 3.375 gm IV every 12 hours. Pharmacy will continue to monitor daily and make further adjustment(s) accordingly.     Subjective:  Ricardo Hugo is a 75 y.o. male with a  \"Pharmacy to Dose Zosyn\" consult for the treatment of pneumonia , day 1 of 7 of treatment.    Objective:  Ht: 182.9 cm (72\"); Wt: 59.8 kg (131 lb 13.4 oz)  Estimated Creatinine Clearance: 17.7 mL/min (A) (by C-G formula based on SCr of 2.96 mg/dL (H)).   Creatinine   Date Value Ref Range Status   05/29/2025 2.96 (H) 0.76 - 1.27 mg/dL Final   05/28/2025 3.92 (H) 0.76 - 1.27 mg/dL Final   05/27/2025 2.89 (H) 0.76 - 1.27 mg/dL Final   04/12/2021 3.10 (H) 0.60 - 1.30 mg/dL Final     Comment:     Serial Number: [id number]:  [id number]   05/27/2020 1.00 0.60 - 1.30 mg/dL Final     Comment:     Serial Number: [id number]:  [id number]      Lab Results   Component Value Date    WBC 11.20 (H) 05/29/2025    WBC 8.79 05/28/2025    WBC 6.87 05/27/2025      Baseline culture results:  Microbiology Results (last 10 days)       ** No results found for the last 240 hours. **            Anabela Blanton, PharmD  05/29/25 16:18 CDT    "

## 2025-05-29 NOTE — PROGRESS NOTES
LOS: 8 days   Patient Care Team:  Nathan Zimmer MD as PCP - General (Internal Medicine)  Adrien Brewster MD as Consulting Physician (Gastroenterology)  Eleuterio Farley MD (Inactive) as Cardiologist (Cardiology)  Elena Jon APRN as Referring Physician (Vascular Surgery)  Bolivar Rueda MD as Consulting Physician (Nephrology)  Slava Tate APRN as Nurse Practitioner (Family Medicine)  New Omalley MD as Consulting Physician (Pulmonary Disease)  Becky Camacho APRN as Nurse Practitioner (Hematology and Oncology)  Gil Pineda DO as Consulting Physician (Vascular Surgery)    Chief Complaint: Left lower extremity pain    Subjective     Patient Complaints: Patient seen/examined lying in bed.  He currently has no complaints at this time.  He appears quite groggy, daughter who is sitting bedside states that he is really hard to keep awake and this is not his normal.  She states that he has only had pain medicine at about 2:30 AM today, and did not take any yesterday.  I was able to explain our plan to proceed with left lower extremity angiogram in an attempt to revascularize and hopefully refrain from amputation or at least provide the best possibility of healing an amputation if that is the course we have to take.  Risks of angiogram were discussed.  These include, but are not limited to, bleeding, infection, vessel damage, nerve damage, embolus, and loss of limb.  The patient understands these risks and wishes to proceed with procedure.       Objective     Vital Signs  Temp:  [98 °F (36.7 °C)-98.9 °F (37.2 °C)] 98.4 °F (36.9 °C)  Heart Rate:  [57-76] 57  Resp:  [16] 16  BP: (110-167)/(36-49) 148/45    Physical Exam  Vitals and nursing note reviewed.   Constitutional:       General: He is not in acute distress.     Appearance: Normal appearance. He is underweight. He is not diaphoretic.   HENT:      Head: Normocephalic. No right periorbital erythema or left  periorbital erythema.      Nose: Nose normal.   Eyes:      General: No scleral icterus.     Pupils: Pupils are equal.   Cardiovascular:      Rate and Rhythm: Normal rate and regular rhythm.      Pulses: Normal pulses.      Heart sounds: Normal heart sounds. No murmur heard.  Pulmonary:      Effort: Pulmonary effort is normal. No respiratory distress.      Breath sounds: Normal breath sounds.   Abdominal:      General: Bowel sounds are normal. There is no distension.      Palpations: Abdomen is soft.      Tenderness: There is no abdominal tenderness. There is no guarding.   Musculoskeletal:         General: No swelling or tenderness. Normal range of motion.      Cervical back: Normal range of motion and neck supple.      Right lower leg: No edema.      Left lower leg: No edema.   Feet:      Right foot:      Skin integrity: Skin integrity normal.      Comments: Discoloration to left foot and cool to the touch  Skin:     General: Skin is warm and dry.      Findings: No erythema or rash.   Neurological:      General: No focal deficit present.      Mental Status: He is alert and oriented to person, place, and time. Mental status is at baseline.      Cranial Nerves: No cranial nerve deficit.      Gait: Gait normal.   Psychiatric:         Attention and Perception: Attention normal.         Mood and Affect: Mood normal.         Behavior: Behavior normal.         Thought Content: Thought content normal.         Judgment: Judgment normal.         Laboratory Data:   Results from last 7 days   Lab Units 05/29/25  0552 05/28/25  0401 05/27/25  0320   WBC 10*3/mm3 11.20* 8.79 6.87   HEMOGLOBIN g/dL 7.0* 8.5* 7.9*   HEMATOCRIT % 22.0* 26.7* 24.9*   PLATELETS 10*3/mm3 108* 140 113*       Results from last 7 days   Lab Units 05/29/25  0241 05/28/25  0401 05/27/25  0320 05/26/25  0309 05/25/25  0457 05/24/25  0214 05/23/25  0303   SODIUM mmol/L 134* 137 139   < > 139   < > 139   POTASSIUM mmol/L 3.9 4.1 3.8   < > 3.3*   < > 3.2*    CHLORIDE mmol/L 97* 97* 98   < > 96*   < > 99   CO2 mmol/L 26.0 27.0 29.0   < > 26.0   < > 25.0   BUN mg/dL 39.3* 47.5* 31*   < > 43*   < > 35*   CREATININE mg/dL 2.96* 3.92* 2.89*   < > 3.75*   < > 3.80*   CALCIUM mg/dL 8.4* 8.9 8.8   < > 9.3   < > 9.2   BILIRUBIN mg/dL  --   --   --   --  0.4  --  0.2   ALK PHOS U/L  --   --   --   --  102  --  116   ALT (SGPT) U/L  --   --   --   --  <5  --  5   AST (SGOT) U/L  --   --   --   --  20  --  20   GLUCOSE mg/dL 120* 106* 106*   < > 138*   < > 105*    < > = values in this interval not displayed.                Medication Review: Reviewed    Assessment & Plan       Arterial occlusion    Chronic rhinitis    Essential hypertension    Resistant hypertension    Symptomatic anemia    Peripheral arterial disease    IHD (ischemic heart disease)    Diastolic dysfunction    Anemia due to chronic kidney disease    CLL (chronic lymphocytic leukemia)    Mixed hyperlipidemia    Eustachian tube dysfunction, bilateral    Sensorineural hearing loss (SNHL), bilateral    Anticoagulated    Nasal septal deviation    Chronic diastolic (congestive) heart failure    Abnormal TSH    ESRD (end stage renal disease) on dialysis    Acute pain of left lower extremity    Thrombocytopenia          Plan for disposition:  Plan for left lower extremity angiogram tomorrow  N.p.o. after midnight  Nursing to obtain consent form    STERLING Sena  Vascular Surgery  [phone]  05/29/25  08:32 CDT

## 2025-05-29 NOTE — PROGRESS NOTES
Nephrology ([facility]) Progress Note      Patient:  Ricardo Hugo  YOB: 1950  Date of Service: 5/29/2025  MRN: [id number]   Acct: [id number]   Primary Care Physician: Nathan Zimmer MD  Advance Directive:   Code Status and Medical Interventions: CPR (Attempt to Resuscitate); Full Support   Ordered at: 05/21/25 1822     Code Status (Patient has no pulse and is not breathing):    CPR (Attempt to Resuscitate)     Medical Interventions (Patient has pulse or is breathing):    Full Support     Admit Date: 5/21/2025       Hospital Day: 8  Referring Provider: No ref. provider found      Patient personally seen and examined.  Complete chart including Consults, Notes, Operative Reports, Labs, Cardiology, and Radiology studies reviewed as able.        Subjective:  Ricardo Hugo is a 75 y.o. male for whom we were consulted for evaluation and treatment of end stage renal disease on hemodialysis Monday Wednesday Friday.  Presented with left lower leg pain. Recently had right lower extremity angiogram which has significantly improved the pain he was having in his right leg. Dr Zabala took patient to OR on 5/22 for angiogram and stenting of left external iliac artery.  Has been tolerating inpatient HD well since arrival. Unfortunately pain in left leg has persisted and a left AKA has been considered. Tolerated HD well on 5/28    Today is drowsy. No new complaint or overnight issues    Allergies:  Ondansetron, Zofran [ondansetron hcl], Lortab [hydrocodone-acetaminophen], and Allopurinol    Home Meds:  Medications Prior to Admission   Medication Sig Dispense Refill Last Dose/Taking    ALPRAZolam (XANAX) 0.25 MG tablet Take 1 tablet by mouth Every Night.   Taking    aspirin (aspirin) 81 MG EC tablet Take 1 tablet by mouth Daily.   Taking    atorvastatin (LIPITOR) 10 MG tablet Take 1 tablet by mouth Daily. 90 tablet 3 Taking    calcitriol (ROCALTROL) 0.5 MCG capsule Take 1 capsule by  mouth Daily. 90 capsule 2 Taking    carvedilol (COREG) 25 MG tablet Take 1 tablet by mouth 2 (Two) Times a Day With Meals.   Taking    cholestyramine light 4 g packet Take 1 packet by mouth Daily. 90 packet 1 Taking    cloNIDine (CATAPRES) 0.3 MG tablet Take 1 tablet by mouth 3 times a day.   Taking    clopidogrel (PLAVIX) 75 MG tablet Take 1 tablet by mouth Daily.   Taking    Copper Gluconate (Copper Caps) 2 MG capsule Take 2 mg by mouth Daily.   Taking    empagliflozin (Jardiance) 10 MG tablet tablet Take 1 tablet by mouth Daily.   Taking    fexofenadine (ALLEGRA) 180 MG tablet Take 1 tablet by mouth Daily.   Taking    furosemide (LASIX) 40 MG tablet Take 1 tablet by mouth Daily. 90 tablet 2 Taking    hydrALAZINE (APRESOLINE) 100 MG tablet Take 1 tablet by mouth 3 (Three) Times a Day. 100mg daily   Taking    isosorbide dinitrate (ISORDIL) 10 MG tablet Take 1 tablet by mouth 3 (Three) Times a Day. 270 tablet 2 Taking    levothyroxine (Synthroid) 100 MCG tablet Take 1 tablet by mouth Every Morning. 90 tablet 2 Taking    magnesium chloride ER 64 MG DR tablet Take 1 tablet by mouth Daily.   Taking    Melatonin 10 MG tablet Take 1 tablet by mouth Every Night.   Taking    mesalamine (APRISO) 0.375 g 24 hr capsule Take 4 capsules by mouth Daily. 360 capsule 1 Taking    Multiple Vitamins-Minerals (PRESERVISION/LUTEIN) capsule Take 1 capsule by mouth 2 (two) times a day.   Taking    NIFEdipine XL (PROCARDIA XL) 60 MG 24 hr tablet Take 1 tablet by mouth Daily.   Taking    omeprazole (priLOSEC) 20 MG capsule Take 1 capsule by mouth Daily.   Taking    sodium bicarbonate 650 MG tablet Take 1 tablet by mouth 5 (Five) Times a Day.   Taking    spironolactone (ALDACTONE) 25 MG tablet Take 1 tablet by mouth Daily.   Taking    tamsulosin (FLOMAX) 0.4 MG capsule 24 hr capsule Take 1 capsule by mouth Every Night.   Taking    valsartan (DIOVAN) 320 MG tablet Take 1 tablet by mouth Daily.   Taking    vitamin D (ERGOCALCIFEROL) 1.25 MG  (78609 UT) capsule capsule Take 1 capsule by mouth 1 (One) Time Per Week. Mondays   Taking    albuterol sulfate  (90 Base) MCG/ACT inhaler Inhale 2 puffs Every 6 (Six) Hours As Needed for Wheezing or Shortness of Air.       aspirin-acetaminophen-caffeine (EXCEDRIN MIGRAINE) 250-250-65 MG per tablet Take 1 tablet by mouth Every 6 (Six) Hours As Needed for Headache. (Patient taking differently: Take 3 tablets by mouth Every 6 (Six) Hours As Needed for Headache. Patient reports he takes 6-12 every day.)       Budeson-Glycopyrrol-Formoterol (Breztri Aerosphere) 160-9-4.8 MCG/ACT aerosol inhaler Inhale 2 puffs 2 (Two) Times a Day.       desoximetasone (TOPICORT) 0.25 % cream Apply 1 Application topically to the appropriate area as directed 2 (Two) Times a Day As Needed for Irritation. irritation       diphenhydrAMINE HCl, Sleep, 50 MG/30ML liquid Take 15 mL by mouth At Night As Needed (insomnia).       docusate sodium (COLACE) 100 MG capsule Take 1 capsule by mouth 3 (Three) Times a Day As Needed for Constipation.       fluticasone (FLONASE) 50 MCG/ACT nasal spray Administer 2 sprays into the nostril(s) as directed by provider Daily As Needed for Allergies.       montelukast (SINGULAIR) 10 MG tablet TAKE 1 TABLET EVERY NIGHT 90 tablet 3        Medicines:  Current Facility-Administered Medications   Medication Dose Route Frequency Provider Last Rate Last Admin    albuterol (PROVENTIL) nebulizer solution 0.083% 2.5 mg/3mL  2.5 mg Nebulization Q6H PRN Garrett Comer MD        ALPRAZolam (XANAX) tablet 0.25 mg  0.25 mg Oral Nightly PRN Blayne Zabala MD   0.25 mg at 05/27/25 2058    aspirin EC tablet 81 mg  81 mg Oral Daily Blayne Zabala MD   81 mg at 05/29/25 0827    atorvastatin (LIPITOR) tablet 10 mg  10 mg Oral Daily Blayne Zabala MD   10 mg at 05/29/25 0826    sennosides-docusate (PERICOLACE) 8.6-50 MG per tablet 2 tablet  2 tablet Oral BID PRN Blayne Zabala MD   2 tablet at 05/25/25 0806    And     polyethylene glycol (MIRALAX) packet 17 g  17 g Oral Daily PRN Blayne Zabala MD        And    bisacodyl (DULCOLAX) EC tablet 5 mg  5 mg Oral Daily PRN Blayne Zabala MD   5 mg at 05/28/25 0634    And    bisacodyl (DULCOLAX) suppository 10 mg  10 mg Rectal Daily PRN Blayne Zabala MD        calcitriol (ROCALTROL) capsule 0.5 mcg  0.5 mcg Oral Daily Blayne Zabala MD   0.5 mcg at 05/29/25 0827    carvedilol (COREG) tablet 25 mg  25 mg Oral BID With Meals Blayne Zabala MD   25 mg at 05/28/25 1745    cloNIDine (CATAPRES) tablet 0.3 mg  0.3 mg Oral TID Blayne Zabala MD   0.3 mg at 05/27/25 2058    clopidogrel (PLAVIX) tablet 75 mg  75 mg Oral Daily Blayne Zabala MD   75 mg at 05/29/25 0828    epoetin cecile-epbx (RETACRIT) 5,000 Units 2 mL injection  5,000 Units Subcutaneous Once per day on Monday Wednesday Friday Garrett Comer MD   5,000 Units at 05/26/25 0925    furosemide (LASIX) injection 20 mg  20 mg Intravenous Once Scott Johnson MD        gabapentin (NEURONTIN) capsule 300 mg  300 mg Oral Nightly Garrett Comer MD   300 mg at 05/28/25 2040    heparin (porcine) injection 3,200 Units  3,200 Units Intracatheter PRN Twan Quiroz MD   3,200 Units at 05/28/25 1205    heparin (porcine) injection 3,600 Units  3,600 Units Intravenous Once Twan Quiroz MD        hydrALAZINE (APRESOLINE) injection 10 mg  10 mg Intravenous Q6H PRN Blayne Zabala MD   10 mg at 05/22/25 0654    hydrALAZINE (APRESOLINE) tablet 100 mg  100 mg Oral TID Blayne Zabala MD   100 mg at 05/27/25 2058    ipratropium-albuterol (DUO-NEB) nebulizer solution 3 mL  3 mL Nebulization TID - RT Garrett Comer MD        iron polysaccharides (NIFEREX) capsule 150 mg  150 mg Oral Daily Garrett Comer MD   150 mg at 05/29/25 0826    isosorbide dinitrate (ISORDIL) tablet 10 mg  10 mg Oral TID - Nitrates Blayne Zabala MD   10 mg at 05/28/25 1429    levothyroxine (SYNTHROID, LEVOTHROID) tablet 100 mcg  100  mcg Oral Q AM Blayne Zabala MD   100 mcg at 05/29/25 0553    montelukast (SINGULAIR) tablet 10 mg  10 mg Oral Nightly Blayne Zabala MD   10 mg at 05/28/25 2040    Morphine sulfate (PF) injection 1 mg  1 mg Intravenous Q4H PRN Garrett Comer MD   1 mg at 05/29/25 0553    NIFEdipine XL (PROCARDIA XL) 24 hr tablet 90 mg  90 mg Oral Daily Garrett Comer MD        nitroglycerin (NITROSTAT) SL tablet 0.4 mg  0.4 mg Sublingual Q5 Min PRN Blayne Zabala MD        oxyCODONE-acetaminophen (PERCOCET) 5-325 MG per tablet 1 tablet  1 tablet Oral Q4H PRN Garrett Comer MD   1 tablet at 05/29/25 0943    pantoprazole (PROTONIX) EC tablet 40 mg  40 mg Oral Q AM Blayne Zabala MD   40 mg at 05/29/25 0553    prochlorperazine (COMPAZINE) injection 5 mg  5 mg Intravenous Q6H PRN Pancho Camacho MD   5 mg at 05/28/25 1752    Or    prochlorperazine (COMPAZINE) tablet 5 mg  5 mg Oral Q6H PRN Pancho Camacho MD        Or    prochlorperazine (COMPAZINE) suppository 25 mg  25 mg Rectal Q12H PRN Pancho Camacho MD        promethazine (PHENERGAN) 12.5 mg in sodium chloride 0.9 % 50 mL  12.5 mg Intravenous Q6H PRN Pancho Camacho MD        sodium chloride 0.9 % flush 10 mL  10 mL Intravenous PRN Blayne Zabala MD        sodium chloride 0.9 % flush 10 mL  10 mL Intravenous Q12H Blayne Zabala MD   10 mL at 05/29/25 0829    sodium chloride 0.9 % flush 10 mL  10 mL Intravenous PRN Blayne Zabala MD        sodium chloride 0.9 % flush 10 mL  10 mL Intravenous Q12H Blayne Zabala MD   10 mL at 05/29/25 0829    sodium chloride 0.9 % flush 10 mL  10 mL Intravenous PRN Blayne Zabala MD        sodium chloride 0.9 % infusion 40 mL  40 mL Intravenous PRN Blayne Zabala MD        sodium chloride 0.9 % infusion 40 mL  40 mL Intravenous PRN Blayne Zabala MD        spironolactone (ALDACTONE) tablet 25 mg  25 mg Oral Daily Blayne Zabala MD   25 mg at 05/27/25 0759    tamsulosin (FLOMAX) 24 hr capsule 0.4 mg  0.4 mg  Oral Nightly Blayne Zabala MD   0.4 mg at 05/28/25 2040    valsartan (DIOVAN) tablet 320 mg  320 mg Oral Daily Blayne Zabala MD   320 mg at 05/27/25 0756       Past Medical History:  Past Medical History:   Diagnosis Date    3-vessel CAD 08/11/2020    Allergic rhinitis     Anemia     Anxiety disorder 04/27/2020    Arthritis     Asymmetrical sensorineural hearing loss 06/28/2017    Atherosclerosis of native artery of both lower extremities with intermittent claudication 07/18/2019    Avascular necrosis of femoral head, left 07/11/2020    right hip after surgery    Carotid stenosis     Chronic mucoid otitis media     Chronic rhinitis     COPD (chronic obstructive pulmonary disease)     Coronary artery disease     HEART BYPASS 2004    Crohn's disease of large intestine with other complication 07/30/2020    Chronic diarrhea Colonoscopy July 2020 revealed mild patchy scattered hemosiderin staining with inflammation more so in rectosigmoid area.  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    Iron deficiency anemia 07/14/2020    Mixed hearing loss of left ear     PAD (peripheral artery disease) 08/11/2020    Perianal abscess     Pernicious anemia 08/17/2020    took shots but never diagnosed with b12 deficiency    Personal history of alcoholism 08/11/2020    quit drinking in 2013    Prostatic hypertrophy 08/11/2020    Sensorineural hearing loss     Sepsis with acute renal failure 09/15/2020    Shortness of breath 05/27/2021    Tinnitus     Ventricular tachycardia, nonsustained 07/14/2020    Weight loss 07/11/2020       Past Surgical History:  Past Surgical  History:   Procedure Laterality Date    AORTOGRAM Right 4/25/2025    Procedure: RIGHT LOWER EXTREMITY ANGIOGRAM, SHOCKWAVE LITHOTRIPSY, BALLOON ANGIOPLASTY, MYNX CLOSURE;  Surgeon: Gil Pineda DO;  Location: [facility] HYBRID OR;  Service: Vascular;  Laterality: Right;    AORTOGRAM Left 5/22/2025    Procedure: LEFT LOWER EXTREMITY ANGIOGRAM, SHOCKWAVE LITHOTRIPSY, BALLOON ANGIOPLASTY, STENT PLACEMENT, MYNX CLOSURE;  Surgeon: Blayne Zabala MD;  Location: [facility] HYBRID OR;  Service: Vascular;  Laterality: Left;    ARTERY SURGERY  2021    right side on neck    CAROTID ENDARTERECTOMY Right 05/10/2021    Procedure: RIGHT CAROTID ENDARTERECTOMY WITH EEG;  Surgeon: Gil Pineda DO;  Location: [facility] HYBRID OR 12;  Service: Vascular;  Laterality: Right;    COLONOSCOPY N/A 07/02/2020    Procedure: COLONOSCOPY WITH ANESTHESIA;  Surgeon: Adrien Brewster MD;  Location: [facility] ENDOSCOPY;  Service: Gastroenterology;  Laterality: N/A;  pre op: diarrhea  post op: polyps  PCP: Joe Velasco MD    COLONOSCOPY N/A 10/13/2020    Procedure: COLONOSCOPY WITH ANESTHESIA;  Surgeon: Adrien Brewster MD;  Location: [facility] ENDOSCOPY;  Service: Gastroenterology;  Laterality: N/A;  Pre: Chronic Diarrhea, Crohn's  Post: AVM  Dr. Neftali Velasco  CO2 Inflation Used    COLONOSCOPY N/A 12/08/2023    Procedure: COLONOSCOPY WITH ANESTHESIA;  Surgeon: Adrien Brewster MD;  Location: [facility] ENDOSCOPY;  Service: Gastroenterology;  Laterality: N/A;  pre chrone's disease  post sub optimal prep, polyp, chrone's      CORONARY ARTERY BYPASS GRAFT  2003    x3    ENDOSCOPY N/A 11/02/2021    Procedure: ESOPHAGOGASTRODUODENOSCOPY WITH ANESTHESIA;  Surgeon: Bridger Bell MD;  Location: [facility] ENDOSCOPY;  Service: Gastroenterology;  Laterality: N/A;  pre anemia;gi bleed  post  gi bleed;schatski ring  Dr. ERIC Velasco    ENDOSCOPY N/A 10/10/2023    Procedure: ESOPHAGOGASTRODUODENOSCOPY WITH ANESTHESIA;  Surgeon: Adrien Brewster  MD;  Location: [facility] ENDOSCOPY;  Service: Gastroenterology;  Laterality: N/A;  preop; anemia  postop esophagitis ; R/O barretts   PCP Randall Beata    EYE SURGERY Bilateral     catorac    INCISION AND DRAINAGE PERIRECTAL ABSCESS N/A 2017    Procedure: INCISION AND DRAINAGE OF JEET ANAL ABSCESS;  Surgeon: Lynette Smith MD;  Location: [facility] OR;  Service:     INGUINAL HERNIA REPAIR Bilateral 2023    Procedure: INGUINAL HERNIA BILATERAL REPAIR LAPAROSCOPIC WITH DAVINCI ROBOT WITH MESH;  Surgeon: Tahira Rivera MD;  Location: [facility] OR;  Service: Robotics - DaVinci;  Laterality: Bilateral;    INSERTION HEMODIALYSIS CATHETER N/A 2025    Procedure: HEMODIALYSIS CATHETER PLACEMENT;  Surgeon: Gil Pineda DO;  Location: [facility] HYBRID OR;  Service: Vascular;  Laterality: N/A;    MYRINGOTOMY W/ TUBES Left 2017    06/10/2016    TONSILLECTOMY      TOTAL HIP ARTHROPLASTY Right        Family History  Family History   Problem Relation Age of Onset    Breast cancer Mother     Dementia Father     Glaucoma Father     No Known Problems Daughter     Colon polyps Neg Hx     Colon cancer Neg Hx        Social History  Social History     Socioeconomic History    Marital status:    Tobacco Use    Smoking status: Former     Current packs/day: 0.00     Average packs/day: 0.5 packs/day for 25.8 years (12.9 ttl pk-yrs)     Types: Cigarettes     Start date:      Quit date: 10/13/2013     Years since quittin.6     Passive exposure: Past    Smokeless tobacco: Never    Tobacco comments:     quit 2013   Vaping Use    Vaping status: Former    Substances: Nicotine    Devices: Pre-filled or refillable cartridge   Substance and Sexual Activity    Alcohol use: Not Currently    Drug use: No    Sexual activity: Not Currently     Partners: Female       Review of Systems:  History obtained from chart review and the patient  General ROS: No fever or chills  Respiratory ROS: No cough, shortness of  breath, wheezing  Cardiovascular ROS: No chest pain or palpitations  Gastrointestinal ROS: No abdominal pain or melena  Genito-Urinary ROS: No dysuria or hematuria  Psych ROS: No anxiety and depression  14 point ROS reviewed with the patient and negative except as noted above and in the HPI unless unable to obtain.    Objective:  Patient Vitals for the past 24 hrs:   BP Temp Temp src Pulse Resp SpO2 Weight   05/29/25 0747 148/45 98.4 °F (36.9 °C) Oral 57 16 95 % --   05/29/25 0325 130/49 98 °F (36.7 °C) Oral 67 16 93 % 59.8 kg (131 lb 13.4 oz)   05/28/25 2312 (!) 112/38 98.2 °F (36.8 °C) Oral 63 16 97 % --   05/28/25 1955 (!) 110/36 98.2 °F (36.8 °C) Oral 69 16 93 % --   05/28/25 1615 167/40 98.9 °F (37.2 °C) Axillary 70 16 93 % --   05/28/25 1241 145/40 98.6 °F (37 °C) Axillary 76 16 92 % --       Intake/Output Summary (Last 24 hours) at 5/29/2025 1042  Last data filed at 5/29/2025 0900  Gross per 24 hour   Intake 600 ml   Output --   Net 600 ml     General: awake/alert   Chest:  clear to auscultation bilaterally without respiratory distress  CVS: regular rate and rhythm  Abdominal: soft, nontender, positive bowel sounds  Extremities: no cyanosis or edema  Skin: warm and dry without rash      Labs:  Results from last 7 days   Lab Units 05/29/25  0552 05/28/25  0401 05/27/25  0320   WBC 10*3/mm3 11.20* 8.79 6.87   HEMOGLOBIN g/dL 7.0* 8.5* 7.9*   HEMATOCRIT % 22.0* 26.7* 24.9*   PLATELETS 10*3/mm3 108* 140 113*         Results from last 7 days   Lab Units 05/29/25  0241 05/28/25  0401 05/27/25  0320 05/26/25  0309 05/25/25  0457 05/24/25  0214 05/23/25  0303   SODIUM mmol/L 134* 137 139   < > 139   < > 139   POTASSIUM mmol/L 3.9 4.1 3.8   < > 3.3*   < > 3.2*   CHLORIDE mmol/L 97* 97* 98   < > 96*   < > 99   CO2 mmol/L 26.0 27.0 29.0   < > 26.0   < > 25.0   BUN mg/dL 39.3* 47.5* 31*   < > 43*   < > 35*   CREATININE mg/dL 2.96* 3.92* 2.89*   < > 3.75*   < > 3.80*   CALCIUM mg/dL 8.4* 8.9 8.8   < > 9.3   < > 9.2   EGFR  "mL/min/1.73 21.1* 15.0* 21.7*   < > 15.9*   < > 15.6*   BILIRUBIN mg/dL  --   --   --   --  0.4  --  0.2   ALK PHOS U/L  --   --   --   --  102  --  116   ALT (SGPT) U/L  --   --   --   --  <5  --  5   AST (SGOT) U/L  --   --   --   --  20  --  20   GLUCOSE mg/dL 120* 106* 106*   < > 138*   < > 105*    < > = values in this interval not displayed.       Radiology:   Imaging Results (Last 72 Hours)       ** No results found for the last 72 hours. **            Culture:  No results found for: \"BLOODCX\", \"URINECX\", \"WOUNDCX\", \"MRSACX\", \"RESPCX\", \"STOOLCX\"      Assessment    End stage renal disease on HD MWF  Hypertension  Anemia of CKD  Peripheral vascular disease--s/p angiogram on 5/22  Thrombocytopenia  Hypokalemia--improved    Plan:   Dialysis next due tomorrow  Noted plans for left lower extremity angiogram tomorrow      Slvaa Tate, APRN  5/29/2025  10:42 CDT    "

## 2025-05-29 NOTE — PLAN OF CARE
Goal Outcome Evaluation:           Progress: no change        A/O x 4. Large BM, voiding. C/o pain. (See MAR) PO BP meds not given due to low BP. Safety maintained.

## 2025-05-29 NOTE — PLAN OF CARE
Goal Outcome Evaluation:                 Patient alert and oriented x4. Angiogram planned for 5/30. NPO after midnight. Severe pain being treated with prn medication. No needs at this time. Blood planning to be given 5/30 during dialysis.

## 2025-05-29 NOTE — PROGRESS NOTES
[facility] Medicine Services  INPATIENT PROGRESS NOTE    Patient Name: Ricardo Hugo  Date of Admission: 5/21/2025  Today's Date: 05/29/25  Length of Stay: 8  Primary Care Physician: Nathan Zimmer MD    Subjective   Chief Complaint: Peripheral vascular disease/pain/dialysis/anemia/thrombocytopenia   HPI   Blood pressure stable slightly bradycardia, afebrile.  Hemoglobin 7, patient plan to have surgery tomorrow, will give 1 unit of blood today.  Patient is on room air.  Sodium slightly decreased.  Creatinine improved.  Leukocytosis noted.  Hemoglobin 7.0.  Decrease in platelet counts.    Review of Systems   Constitutional:  Positive for activity change, appetite change and fatigue. Negative for chills and fever.   HENT:  Negative for hearing loss, nosebleeds, tinnitus and trouble swallowing.    Eyes:  Negative for visual disturbance.   Respiratory:  Negative for cough, chest tightness, shortness of breath and wheezing.    Cardiovascular:  Negative for chest pain, palpitations and leg swelling.   Gastrointestinal:  Negative for abdominal distention, abdominal pain, blood in stool, constipation, diarrhea, nausea and vomiting.   Endocrine: Negative for cold intolerance, heat intolerance, polydipsia, polyphagia and polyuria.   Genitourinary:  Negative for decreased urine volume, difficulty urinating, dysuria, flank pain, frequency and hematuria.   Musculoskeletal:  Positive for arthralgias, gait problem and myalgias. Negative for joint swelling.   Skin:  Negative for rash.   Allergic/Immunologic: Negative for immunocompromised state.   Neurological:  Positive for weakness. Negative for dizziness, syncope, light-headedness and headaches.   Hematological:  Negative for adenopathy. Does not bruise/bleed easily.   Psychiatric/Behavioral:  Negative for confusion and sleep disturbance. The patient is not nervous/anxious.   All pertinent negatives and positives are as above. All  other systems have been reviewed and are negative unless otherwise stated.     Objective    Temp:  [98 °F (36.7 °C)-98.9 °F (37.2 °C)] 98.9 °F (37.2 °C)  Heart Rate:  [] 58  Resp:  [16] 16  BP: (110-167)/(36-61) 138/61  Physical Exam  Vitals and nursing note reviewed.   Constitutional:       Comments: Advanced age.  Cachectic.  Chronically ill.   HENT:      Head: Normocephalic.   Eyes:      Conjunctiva/sclera: Conjunctivae normal.      Pupils: Pupils are equal, round, and reactive to light.   Cardiovascular:      Rate and Rhythm: Normal rate and regular rhythm.      Heart sounds: Normal heart sounds.   Pulmonary:      Effort: No respiratory distress.      Comments: Diminished breath sound bilateral, clear, on room air.  Abdominal:      General: Bowel sounds are normal. There is no distension.      Palpations: Abdomen is soft.      Tenderness: There is no abdominal tenderness.   Musculoskeletal:         General: No swelling.      Cervical back: Neck supple.   Skin:     General: Skin is warm and dry.      Findings: No rash.   Neurological:      Mental Status: He is alert and oriented to person, place, and time.      Motor: Weakness present.      Coordination: Coordination abnormal.      Gait: Gait abnormal.   Psychiatric:         Mood and Affect: Mood normal.         Behavior: Behavior normal.         Thought Content: Thought content normal.          Results Review:  I have reviewed the labs, radiology results, and diagnostic studies.    Laboratory Data:   Results from last 7 days   Lab Units 05/29/25  0552 05/28/25  0401 05/27/25  0320   WBC 10*3/mm3 11.20* 8.79 6.87   HEMOGLOBIN g/dL 7.0* 8.5* 7.9*   HEMATOCRIT % 22.0* 26.7* 24.9*   PLATELETS 10*3/mm3 108* 140 113*        Results from last 7 days   Lab Units 05/29/25  0241 05/28/25  0401 05/27/25  0320 05/26/25  0309 05/25/25  0457 05/24/25  0214 05/23/25  0303   SODIUM mmol/L 134* 137 139   < > 139   < > 139   POTASSIUM mmol/L 3.9 4.1 3.8   < > 3.3*   < >  "3.2*   CHLORIDE mmol/L 97* 97* 98   < > 96*   < > 99   CO2 mmol/L 26.0 27.0 29.0   < > 26.0   < > 25.0   BUN mg/dL 39.3* 47.5* 31*   < > 43*   < > 35*   CREATININE mg/dL 2.96* 3.92* 2.89*   < > 3.75*   < > 3.80*   CALCIUM mg/dL 8.4* 8.9 8.8   < > 9.3   < > 9.2   BILIRUBIN mg/dL  --   --   --   --  0.4  --  0.2   ALK PHOS U/L  --   --   --   --  102  --  116   ALT (SGPT) U/L  --   --   --   --  <5  --  5   AST (SGOT) U/L  --   --   --   --  20  --  20   GLUCOSE mg/dL 120* 106* 106*   < > 138*   < > 105*    < > = values in this interval not displayed.       Culture Data:   No results found for: \"BLOODCX\", \"URINECX\", \"WOUNDCX\", \"MRSACX\", \"RESPCX\", \"STOOLCX\"    Radiology Data:   Imaging Results (Last 24 Hours)       ** No results found for the last 24 hours. **            I have reviewed the patient's current medications.     Assessment/Plan   Assessment  Active Hospital Problems    Diagnosis     **Arterial occlusion     Thrombocytopenia     Acute pain of left lower extremity     ESRD (end stage renal disease) on dialysis     Abnormal TSH     Chronic diastolic (congestive) heart failure     Anticoagulated     Sensorineural hearing loss (SNHL), bilateral     Eustachian tube dysfunction, bilateral     Nasal septal deviation     Mixed hyperlipidemia     CLL (chronic lymphocytic leukemia)     Anemia due to chronic kidney disease     Diastolic dysfunction     Symptomatic anemia     Resistant hypertension     Peripheral arterial disease     IHD (ischemic heart disease)     Essential hypertension     Chronic rhinitis        Treatment Plan  Acute ischemic left lower extremity /left leg pain.  Vascular consult.  Status post surgery 5/22/2025-Right common femoral artery access under ultrasound guidance, left posterior tibial artery access ultrasound guidance, Left lower extremity arteriogram with third order selection angiographic interpretation, PTA of the left SFA with a 3 x 60 Tammy cross balloon, Shockwave lithotripsy of the " left external iliac artery with a 6 x 60 shockwave balloon, Left external iliac artery stenting with a 8 x 60 ever flex stent postdilated with a 7 x 60 Ever Cross balloon  Vascular-plan for left above-the-knee amputation next week.     End-stage renal disease.  Dialysis patient.  Calcitriol . Creatinine increased.  Dialysis Monday Wednesday Friday.    Hyponatremia.     Hypokalemia.  Resolved. Magnesium-normal.     Anemia. Status post 1 unit blood transfusion 5/24/25, plan for 1 more unit of blood today.  Globin 7.0.  Patient planned to have surgery tomorrow.  Hemoglobin decreased .  Give 1 unit of blood during dialysis tomorrow...  Niferex . Procrit.    Shortness of breath/Rales.  COPD.  Not exacerbation.  DuoNebs.  Singulair.  Hypothyroidism.  Synthroid.  Incentive spirometer.  Patient is on room air.  Chest x-ray.    Thrombocytopenia.  Platelet decreased.     CAD/hypertension/hyperlipidemia/CHF.  Aspirin . Lipitor.  Coreg . Catapres.  Plavix.  Procardia.  Hydralazine . Isordil . Aldactone.  Diovan . hydralazine as needed.  Nitro as needed.  Echocardiogram 1/11/2025- 56 - 60%,  mild septal asymmetric hypertrophy, diastolic function is consistent with (grade II w/high LAP) pseudonormalization, Normal right ventricular cavity size and systolic function, left atrial cavity is mild to moderately dilated, right atrial cavity is dilated, Mild aortic valve stenosis, Mild to moderate mitral valve regurgitation, Moderate to severe tricuspid valve regurgitation,  tricuspid regurgitation is markedly elevated (>55 mmHg).     Reflux . Protonix.  Compazine as needed.  Phenergan as needed.     Prostate hypertrophy.  Flomax.     Anxiety/depression.  Xanax as needed.     Pain.  Morphine as needed.  Percocet as needed.  Neurontin at Night.  Flexeril as needed.     Nutrition . Diabetic diet.   Boost supplement.  Fortified pudding.     Deconditioning.  PT consult.     Medical Decision Making  Number and Complexity of problems:  Ischemic left leg/end-stage renal disease/hypokalemia/anemia/thrombocytopenia/CAD/CHF/COPD  Differential Diagnosis: None     Conditions and Status        Condition is unchanged.     MDM Data  External documents reviewed: Previous note .  Cardiac tracing (EKG, telemetry) interpretation: No monitor  Radiology interpretation: Echo  Labs reviewed: Laboratory  Any tests that were considered but not ordered: Lab in a.m.     Decision rules/scores evaluated (example [id number], Wells, etc): None     Discussed with: Patient and daughter     Care Planning  Shared decision making: Patient and daughter  Code status and discussions: Full code     Disposition  Social Determinants of Health that impact treatment or disposition: From home  Patient probably need rehab placement.  1 to 4 days.        Electronically signed by Garrett Comer MD, 05/29/25, 14:31 CDT.

## 2025-05-30 ENCOUNTER — APPOINTMENT (OUTPATIENT)
Dept: INTERVENTIONAL RADIOLOGY/VASCULAR | Facility: HOSPITAL | Age: 75
End: 2025-05-30
Payer: MEDICARE

## 2025-05-30 ENCOUNTER — ANESTHESIA EVENT (OUTPATIENT)
Dept: PERIOP | Facility: HOSPITAL | Age: 75
End: 2025-05-30
Payer: MEDICARE

## 2025-05-30 ENCOUNTER — ANESTHESIA (OUTPATIENT)
Dept: PERIOP | Facility: HOSPITAL | Age: 75
End: 2025-05-30
Payer: MEDICARE

## 2025-05-30 LAB
ANION GAP SERPL CALCULATED.3IONS-SCNC: 11 MMOL/L (ref 5–15)
ANION GAP SERPL CALCULATED.3IONS-SCNC: 14 MMOL/L (ref 5–15)
ARTERIAL PATENCY WRIST A: ABNORMAL
ATMOSPHERIC PRESS: 750 MMHG
BASE EXCESS BLDA CALC-SCNC: 4.7 MMOL/L (ref 0–2)
BDY SITE: ABNORMAL
BODY TEMPERATURE: 37
BUN SERPL-MCNC: 19.5 MG/DL (ref 8–23)
BUN SERPL-MCNC: 52.5 MG/DL (ref 8–23)
BUN/CREAT SERPL: 10.5 (ref 7–25)
BUN/CREAT SERPL: 12.4 (ref 7–25)
CA-I BLD-MCNC: 4.45 MG/DL (ref 4.6–5.4)
CALCIUM SPEC-SCNC: 8.4 MG/DL (ref 8.6–10.5)
CALCIUM SPEC-SCNC: 8.4 MG/DL (ref 8.6–10.5)
CHLORIDE SERPL-SCNC: 101 MMOL/L (ref 98–107)
CHLORIDE SERPL-SCNC: 95 MMOL/L (ref 98–107)
CO2 SERPL-SCNC: 25 MMOL/L (ref 22–29)
CO2 SERPL-SCNC: 26 MMOL/L (ref 22–29)
COHGB MFR BLD: 2.1 % (ref 0–5)
CREAT SERPL-MCNC: 1.86 MG/DL (ref 0.76–1.27)
CREAT SERPL-MCNC: 4.22 MG/DL (ref 0.76–1.27)
DEPRECATED RDW RBC AUTO: 63.5 FL (ref 37–54)
DEPRECATED RDW RBC AUTO: 66.3 FL (ref 37–54)
EGFRCR SERPLBLD CKD-EPI 2021: 13.8 ML/MIN/1.73
EGFRCR SERPLBLD CKD-EPI 2021: 36.8 ML/MIN/1.73
ERYTHROCYTE [DISTWIDTH] IN BLOOD BY AUTOMATED COUNT: 17.8 % (ref 12.3–15.4)
ERYTHROCYTE [DISTWIDTH] IN BLOOD BY AUTOMATED COUNT: 18.1 % (ref 12.3–15.4)
GLUCOSE SERPL-MCNC: 102 MG/DL (ref 65–99)
GLUCOSE SERPL-MCNC: 98 MG/DL (ref 65–99)
HCO3 BLDA-SCNC: 28.1 MMOL/L (ref 20–26)
HCT VFR BLD AUTO: 22.2 % (ref 37.5–51)
HCT VFR BLD AUTO: 23 % (ref 37.5–51)
HCT VFR BLD CALC: 24.1 % (ref 38–51)
HGB BLD-MCNC: 7.2 G/DL (ref 13–17.7)
HGB BLD-MCNC: 7.5 G/DL (ref 13–17.7)
HGB BLDA-MCNC: 7.9 G/DL (ref 14–18)
Lab: ABNORMAL
MCH RBC QN AUTO: 32.1 PG (ref 26.6–33)
MCH RBC QN AUTO: 32.9 PG (ref 26.6–33)
MCHC RBC AUTO-ENTMCNC: 32.4 G/DL (ref 31.5–35.7)
MCHC RBC AUTO-ENTMCNC: 32.6 G/DL (ref 31.5–35.7)
MCV RBC AUTO: 101.4 FL (ref 79–97)
MCV RBC AUTO: 98.3 FL (ref 79–97)
METHGB BLD QL: 0.9 % (ref 0–3)
MODALITY: ABNORMAL
OXYHGB MFR BLDV: 91.1 % (ref 94–99)
PCO2 BLDA: 35.7 MM HG (ref 35–45)
PCO2 TEMP ADJ BLD: 35.7 MM HG (ref 35–45)
PH BLDA: 7.5 PH UNITS (ref 7.35–7.45)
PH, TEMP CORRECTED: 7.5 PH UNITS (ref 7.35–7.45)
PLATELET # BLD AUTO: 114 10*3/MM3 (ref 140–450)
PLATELET # BLD AUTO: 139 10*3/MM3 (ref 140–450)
PMV BLD AUTO: 10 FL (ref 6–12)
PMV BLD AUTO: 10.2 FL (ref 6–12)
PO2 BLDA: 61.6 MM HG (ref 83–108)
PO2 TEMP ADJ BLD: 61.6 MM HG (ref 83–108)
POTASSIUM BLDA-SCNC: 3.8 MMOL/L (ref 3.5–5.2)
POTASSIUM SERPL-SCNC: 3.8 MMOL/L (ref 3.5–5.2)
POTASSIUM SERPL-SCNC: 4.2 MMOL/L (ref 3.5–5.2)
RBC # BLD AUTO: 2.19 10*6/MM3 (ref 4.14–5.8)
RBC # BLD AUTO: 2.34 10*6/MM3 (ref 4.14–5.8)
SAO2 % BLDCOA: 94 % (ref 94–99)
SODIUM BLDA-SCNC: 137 MMOL/L (ref 136–145)
SODIUM SERPL-SCNC: 134 MMOL/L (ref 136–145)
SODIUM SERPL-SCNC: 138 MMOL/L (ref 136–145)
VENTILATOR MODE: ABNORMAL
WBC NRBC COR # BLD AUTO: 7.53 10*3/MM3 (ref 3.4–10.8)
WBC NRBC COR # BLD AUTO: 9.47 10*3/MM3 (ref 3.4–10.8)

## 2025-05-30 PROCEDURE — 85027 COMPLETE CBC AUTOMATED: CPT | Performed by: ANESTHESIOLOGY

## 2025-05-30 PROCEDURE — C1887 CATHETER, GUIDING: HCPCS | Performed by: SURGERY

## 2025-05-30 PROCEDURE — 94799 UNLISTED PULMONARY SVC/PX: CPT

## 2025-05-30 PROCEDURE — 25010000002 PIPERACILLIN SOD-TAZOBACTAM PER 1 G: Performed by: SURGERY

## 2025-05-30 PROCEDURE — C1894 INTRO/SHEATH, NON-LASER: HCPCS | Performed by: SURGERY

## 2025-05-30 PROCEDURE — 25810000003 SODIUM CHLORIDE 0.9 % SOLUTION: Performed by: ANESTHESIOLOGY

## 2025-05-30 PROCEDURE — 25010000002 HEPARIN (PORCINE) PER 1000 UNITS: Performed by: NURSE ANESTHETIST, CERTIFIED REGISTERED

## 2025-05-30 PROCEDURE — 94761 N-INVAS EAR/PLS OXIMETRY MLT: CPT

## 2025-05-30 PROCEDURE — C1760 CLOSURE DEV, VASC: HCPCS | Performed by: SURGERY

## 2025-05-30 PROCEDURE — 86900 BLOOD TYPING SEROLOGIC ABO: CPT

## 2025-05-30 PROCEDURE — C1769 GUIDE WIRE: HCPCS | Performed by: SURGERY

## 2025-05-30 PROCEDURE — 25010000002 MORPHINE PER 10 MG: Performed by: FAMILY MEDICINE

## 2025-05-30 PROCEDURE — 75710 ARTERY X-RAYS ARM/LEG: CPT

## 2025-05-30 PROCEDURE — C1725 CATH, TRANSLUMIN NON-LASER: HCPCS | Performed by: SURGERY

## 2025-05-30 PROCEDURE — 25010000002 PROPOFOL 1000 MG/100ML EMULSION: Performed by: NURSE ANESTHETIST, CERTIFIED REGISTERED

## 2025-05-30 PROCEDURE — 94664 DEMO&/EVAL PT USE INHALER: CPT

## 2025-05-30 PROCEDURE — P9016 RBC LEUKOCYTES REDUCED: HCPCS

## 2025-05-30 PROCEDURE — 25010000002 PIPERACILLIN SOD-TAZOBACTAM PER 1 G: Performed by: FAMILY MEDICINE

## 2025-05-30 PROCEDURE — 047U3ZZ DILATION OF LEFT PERONEAL ARTERY, PERCUTANEOUS APPROACH: ICD-10-PCS | Performed by: SURGERY

## 2025-05-30 PROCEDURE — 80048 BASIC METABOLIC PNL TOTAL CA: CPT | Performed by: FAMILY MEDICINE

## 2025-05-30 PROCEDURE — 25010000002 HEPARIN (PORCINE) PER 1000 UNITS: Performed by: SURGERY

## 2025-05-30 PROCEDURE — 047Q3ZZ DILATION OF LEFT ANTERIOR TIBIAL ARTERY, PERCUTANEOUS APPROACH: ICD-10-PCS | Performed by: SURGERY

## 2025-05-30 PROCEDURE — 85027 COMPLETE CBC AUTOMATED: CPT | Performed by: FAMILY MEDICINE

## 2025-05-30 PROCEDURE — 047K3ZZ DILATION OF RIGHT FEMORAL ARTERY, PERCUTANEOUS APPROACH: ICD-10-PCS | Performed by: SURGERY

## 2025-05-30 PROCEDURE — 76000 FLUOROSCOPY <1 HR PHYS/QHP: CPT

## 2025-05-30 PROCEDURE — 25010000002 ONDANSETRON PER 1 MG: Performed by: NURSE ANESTHETIST, CERTIFIED REGISTERED

## 2025-05-30 PROCEDURE — 25010000002 CEFAZOLIN PER 500 MG: Performed by: NURSE ANESTHETIST, CERTIFIED REGISTERED

## 2025-05-30 PROCEDURE — C2623 CATH, TRANSLUMIN, DRUG-COAT: HCPCS | Performed by: SURGERY

## 2025-05-30 PROCEDURE — 25010000002 HEPARIN (PORCINE) PER 1000 UNITS: Performed by: INTERNAL MEDICINE

## 2025-05-30 PROCEDURE — 82805 BLOOD GASES W/O2 SATURATION: CPT

## 2025-05-30 PROCEDURE — 83050 HGB METHEMOGLOBIN QUAN: CPT

## 2025-05-30 PROCEDURE — 25010000002 BUPIVACAINE (PF) 0.5 % SOLUTION: Performed by: SURGERY

## 2025-05-30 PROCEDURE — 25510000001 IOPAMIDOL 61 % SOLUTION: Performed by: SURGERY

## 2025-05-30 PROCEDURE — 82375 ASSAY CARBOXYHB QUANT: CPT

## 2025-05-30 PROCEDURE — 37224 PR REVSC OPN/PRG FEM/POP W/ANGIOPLASTY UNI: CPT | Performed by: SURGERY

## 2025-05-30 PROCEDURE — 80048 BASIC METABOLIC PNL TOTAL CA: CPT | Performed by: ANESTHESIOLOGY

## 2025-05-30 PROCEDURE — 25010000002 FENTANYL CITRATE (PF) 50 MCG/ML SOLUTION: Performed by: NURSE ANESTHETIST, CERTIFIED REGISTERED

## 2025-05-30 PROCEDURE — B41G1ZZ FLUOROSCOPY OF LEFT LOWER EXTREMITY ARTERIES USING LOW OSMOLAR CONTRAST: ICD-10-PCS | Performed by: SURGERY

## 2025-05-30 PROCEDURE — 37228 PR REVSC OPN/PRQ TIB/PERO W/ANGIOPLASTY UNI: CPT | Performed by: SURGERY

## 2025-05-30 RX ORDER — SODIUM CHLORIDE 9 MG/ML
50 INJECTION, SOLUTION INTRAVENOUS CONTINUOUS
Status: DISCONTINUED | OUTPATIENT
Start: 2025-05-30 | End: 2025-05-30

## 2025-05-30 RX ORDER — HYDROMORPHONE HYDROCHLORIDE 1 MG/ML
0.25 INJECTION, SOLUTION INTRAMUSCULAR; INTRAVENOUS; SUBCUTANEOUS
Status: DISCONTINUED | OUTPATIENT
Start: 2025-05-30 | End: 2025-05-30 | Stop reason: HOSPADM

## 2025-05-30 RX ORDER — FENTANYL CITRATE 50 UG/ML
50 INJECTION, SOLUTION INTRAMUSCULAR; INTRAVENOUS
Status: DISCONTINUED | OUTPATIENT
Start: 2025-05-30 | End: 2025-05-30 | Stop reason: HOSPADM

## 2025-05-30 RX ORDER — LABETALOL HYDROCHLORIDE 5 MG/ML
5 INJECTION, SOLUTION INTRAVENOUS
Status: DISCONTINUED | OUTPATIENT
Start: 2025-05-30 | End: 2025-05-30 | Stop reason: HOSPADM

## 2025-05-30 RX ORDER — OXYCODONE AND ACETAMINOPHEN 5; 325 MG/1; MG/1
1 TABLET ORAL ONCE AS NEEDED
Status: DISCONTINUED | OUTPATIENT
Start: 2025-05-30 | End: 2025-05-30 | Stop reason: HOSPADM

## 2025-05-30 RX ORDER — FENTANYL CITRATE 50 UG/ML
INJECTION, SOLUTION INTRAMUSCULAR; INTRAVENOUS AS NEEDED
Status: DISCONTINUED | OUTPATIENT
Start: 2025-05-30 | End: 2025-05-30 | Stop reason: SURG

## 2025-05-30 RX ORDER — IPRATROPIUM BROMIDE AND ALBUTEROL SULFATE 2.5; .5 MG/3ML; MG/3ML
3 SOLUTION RESPIRATORY (INHALATION) 3 TIMES DAILY PRN
Status: DISCONTINUED | OUTPATIENT
Start: 2025-05-30 | End: 2025-06-05 | Stop reason: HOSPADM

## 2025-05-30 RX ORDER — MORPHINE SULFATE 2 MG/ML
1 INJECTION, SOLUTION INTRAMUSCULAR; INTRAVENOUS EVERY 4 HOURS PRN
Status: DISCONTINUED | OUTPATIENT
Start: 2025-05-30 | End: 2025-06-01

## 2025-05-30 RX ORDER — SODIUM CHLORIDE 0.9 % (FLUSH) 0.9 %
3 SYRINGE (ML) INJECTION EVERY 12 HOURS SCHEDULED
Status: DISCONTINUED | OUTPATIENT
Start: 2025-05-30 | End: 2025-05-30 | Stop reason: HOSPADM

## 2025-05-30 RX ORDER — CEFAZOLIN SODIUM 1 G/3ML
INJECTION, POWDER, FOR SOLUTION INTRAMUSCULAR; INTRAVENOUS AS NEEDED
Status: DISCONTINUED | OUTPATIENT
Start: 2025-05-30 | End: 2025-05-30 | Stop reason: SURG

## 2025-05-30 RX ORDER — EPHEDRINE SULFATE 50 MG/ML
INJECTION, SOLUTION INTRAVENOUS AS NEEDED
Status: DISCONTINUED | OUTPATIENT
Start: 2025-05-30 | End: 2025-05-30 | Stop reason: SURG

## 2025-05-30 RX ORDER — FLUMAZENIL 0.1 MG/ML
0.2 INJECTION INTRAVENOUS AS NEEDED
Status: DISCONTINUED | OUTPATIENT
Start: 2025-05-30 | End: 2025-05-30 | Stop reason: HOSPADM

## 2025-05-30 RX ORDER — HEPARIN SODIUM 1000 [USP'U]/ML
INJECTION, SOLUTION INTRAVENOUS; SUBCUTANEOUS AS NEEDED
Status: DISCONTINUED | OUTPATIENT
Start: 2025-05-30 | End: 2025-05-30 | Stop reason: SURG

## 2025-05-30 RX ORDER — BUPIVACAINE HYDROCHLORIDE 5 MG/ML
INJECTION, SOLUTION EPIDURAL; INTRACAUDAL; PERINEURAL AS NEEDED
Status: DISCONTINUED | OUTPATIENT
Start: 2025-05-30 | End: 2025-05-30 | Stop reason: HOSPADM

## 2025-05-30 RX ORDER — IOPAMIDOL 612 MG/ML
INJECTION, SOLUTION INTRAVASCULAR AS NEEDED
Status: DISCONTINUED | OUTPATIENT
Start: 2025-05-30 | End: 2025-05-30 | Stop reason: HOSPADM

## 2025-05-30 RX ORDER — ONDANSETRON 2 MG/ML
INJECTION INTRAMUSCULAR; INTRAVENOUS AS NEEDED
Status: DISCONTINUED | OUTPATIENT
Start: 2025-05-30 | End: 2025-05-30 | Stop reason: SURG

## 2025-05-30 RX ORDER — SODIUM CHLORIDE 0.9 % (FLUSH) 0.9 %
3-10 SYRINGE (ML) INJECTION AS NEEDED
Status: DISCONTINUED | OUTPATIENT
Start: 2025-05-30 | End: 2025-05-30 | Stop reason: HOSPADM

## 2025-05-30 RX ORDER — PROPOFOL 10 MG/ML
INJECTION, EMULSION INTRAVENOUS CONTINUOUS PRN
Status: DISCONTINUED | OUTPATIENT
Start: 2025-05-30 | End: 2025-05-30 | Stop reason: SURG

## 2025-05-30 RX ORDER — NALOXONE HCL 0.4 MG/ML
0.04 VIAL (ML) INJECTION AS NEEDED
Status: DISCONTINUED | OUTPATIENT
Start: 2025-05-30 | End: 2025-05-30 | Stop reason: HOSPADM

## 2025-05-30 RX ORDER — SODIUM CHLORIDE 9 MG/ML
40 INJECTION, SOLUTION INTRAVENOUS AS NEEDED
Status: DISCONTINUED | OUTPATIENT
Start: 2025-05-30 | End: 2025-05-30 | Stop reason: HOSPADM

## 2025-05-30 RX ORDER — DEXMEDETOMIDINE HYDROCHLORIDE 4 UG/ML
INJECTION, SOLUTION INTRAVENOUS AS NEEDED
Status: DISCONTINUED | OUTPATIENT
Start: 2025-05-30 | End: 2025-05-30 | Stop reason: SURG

## 2025-05-30 RX ADMIN — ALPRAZOLAM 0.25 MG: 0.25 TABLET ORAL at 20:31

## 2025-05-30 RX ADMIN — TAMSULOSIN HYDROCHLORIDE 0.4 MG: 0.4 CAPSULE ORAL at 20:31

## 2025-05-30 RX ADMIN — HEPARIN SODIUM 5000 UNITS: 1000 INJECTION, SOLUTION INTRAVENOUS; SUBCUTANEOUS at 15:18

## 2025-05-30 RX ADMIN — IPRATROPIUM BROMIDE AND ALBUTEROL SULFATE 3 ML: 2.5; .5 SOLUTION RESPIRATORY (INHALATION) at 06:52

## 2025-05-30 RX ADMIN — Medication 10 ML: at 20:31

## 2025-05-30 RX ADMIN — PROPOFOL 50 MCG/KG/MIN: 10 INJECTION, EMULSION INTRAVENOUS at 15:45

## 2025-05-30 RX ADMIN — CLONIDINE HYDROCHLORIDE 0.3 MG: 0.1 TABLET ORAL at 08:50

## 2025-05-30 RX ADMIN — GABAPENTIN 300 MG: 300 CAPSULE ORAL at 20:31

## 2025-05-30 RX ADMIN — EPHEDRINE SULFATE 10 MG: 50 INJECTION, SOLUTION INTRAVENOUS at 15:29

## 2025-05-30 RX ADMIN — PROPOFOL 50 MCG/KG/MIN: 10 INJECTION, EMULSION INTRAVENOUS at 15:00

## 2025-05-30 RX ADMIN — OXYCODONE HYDROCHLORIDE AND ACETAMINOPHEN 1 TABLET: 5; 325 TABLET ORAL at 19:02

## 2025-05-30 RX ADMIN — PIPERACILLIN AND TAZOBACTAM 3.38 G: 3; .375 INJECTION, POWDER, FOR SOLUTION INTRAVENOUS at 06:07

## 2025-05-30 RX ADMIN — PIPERACILLIN AND TAZOBACTAM 3.38 G: 3; .375 INJECTION, POWDER, FOR SOLUTION INTRAVENOUS at 20:30

## 2025-05-30 RX ADMIN — MONTELUKAST SODIUM 10 MG: 10 TABLET, COATED ORAL at 20:31

## 2025-05-30 RX ADMIN — ONDANSETRON 4 MG: 2 INJECTION INTRAMUSCULAR; INTRAVENOUS at 15:00

## 2025-05-30 RX ADMIN — MORPHINE SULFATE 1 MG: 2 INJECTION, SOLUTION INTRAMUSCULAR; INTRAVENOUS at 21:41

## 2025-05-30 RX ADMIN — DEXMEDETOMIDINE HYDROCHLORIDE 20 MCG: 4 INJECTION, SOLUTION INTRAVENOUS at 15:00

## 2025-05-30 RX ADMIN — HYDRALAZINE HYDROCHLORIDE 100 MG: 50 TABLET ORAL at 20:31

## 2025-05-30 RX ADMIN — EPHEDRINE SULFATE 10 MG: 50 INJECTION, SOLUTION INTRAVENOUS at 15:50

## 2025-05-30 RX ADMIN — CLONIDINE HYDROCHLORIDE 0.3 MG: 0.1 TABLET ORAL at 20:31

## 2025-05-30 RX ADMIN — CYCLOBENZAPRINE 5 MG: 10 TABLET, FILM COATED ORAL at 20:31

## 2025-05-30 RX ADMIN — HEPARIN SODIUM 3200 UNITS: 1000 INJECTION INTRAVENOUS; SUBCUTANEOUS at 11:47

## 2025-05-30 RX ADMIN — CEFAZOLIN 2 G: 330 INJECTION, POWDER, FOR SOLUTION INTRAMUSCULAR; INTRAVENOUS at 15:00

## 2025-05-30 RX ADMIN — SODIUM CHLORIDE 50 ML/HR: 9 INJECTION, SOLUTION INTRAVENOUS at 14:29

## 2025-05-30 RX ADMIN — HYDRALAZINE HYDROCHLORIDE 100 MG: 50 TABLET ORAL at 10:44

## 2025-05-30 RX ADMIN — MORPHINE SULFATE 1 MG: 2 INJECTION, SOLUTION INTRAMUSCULAR; INTRAVENOUS at 03:37

## 2025-05-30 RX ADMIN — FENTANYL CITRATE 100 MCG: 50 INJECTION, SOLUTION INTRAMUSCULAR; INTRAVENOUS at 15:01

## 2025-05-30 RX ADMIN — HEPARIN SODIUM 1000 UNITS: 1000 INJECTION, SOLUTION INTRAVENOUS; SUBCUTANEOUS at 15:08

## 2025-05-30 NOTE — PROGRESS NOTES
[facility] Medicine Services  INPATIENT PROGRESS NOTE    Patient Name: Ricardo Hugo  Date of Admission: 5/21/2025  Today's Date: 05/30/25  Length of Stay: 9  Primary Care Physician: Nathan Zimmer MD    Subjective   Chief Complaint: Peripheral vascular disease/pain/dialysis/anemia/thrombocytopenia   HPI   Blood pressure stable, afebrile.  Plan for surgery today.  Sodium stable.  Increasing creatinine.  Creatinine stable.  Increase in platelets.    Review of Systems   Constitutional:  Positive for activity change, appetite change and fatigue. Negative for chills and fever.   HENT:  Negative for hearing loss, nosebleeds, tinnitus and trouble swallowing.    Eyes:  Negative for visual disturbance.   Respiratory:  Negative for cough, chest tightness, shortness of breath and wheezing.    Cardiovascular:  Negative for chest pain, palpitations and leg swelling.   Gastrointestinal:  Negative for abdominal distention, abdominal pain, blood in stool, constipation, diarrhea, nausea and vomiting.   Endocrine: Negative for cold intolerance, heat intolerance, polydipsia, polyphagia and polyuria.   Genitourinary:  Negative for decreased urine volume, difficulty urinating, dysuria, flank pain, frequency and hematuria.   Musculoskeletal:  Positive for arthralgias, gait problem and myalgias. Negative for joint swelling.   Skin:  Negative for rash.   Allergic/Immunologic: Negative for immunocompromised state.   Neurological:  Positive for weakness. Negative for dizziness, syncope, light-headedness and headaches.   Hematological:  Negative for adenopathy. Does not bruise/bleed easily.   Psychiatric/Behavioral:  Negative for confusion and sleep disturbance. The patient is not nervous/anxious.   All pertinent negatives and positives are as above. All other systems have been reviewed and are negative unless otherwise stated.     Objective    Temp:  [97.3 °F (36.3 °C)-99.7 °F (37.6 °C)] 99 °F  (37.2 °C)  Heart Rate:  [48-75] 69  Resp:  [14-18] 14  BP: (115-168)/(41-66) 129/43  Physical Exam  Vitals and nursing note reviewed.   Constitutional:       Comments: Advanced age.  Cachectic.  Chronically ill.   HENT:      Head: Normocephalic.   Eyes:      Conjunctiva/sclera: Conjunctivae normal.      Pupils: Pupils are equal, round, and reactive to light.   Cardiovascular:      Rate and Rhythm: Normal rate and regular rhythm.      Heart sounds: Normal heart sounds.   Pulmonary:      Effort: No respiratory distress.      Comments: Diminished breath sound bilateral, clear, on room air.  Abdominal:      General: Bowel sounds are normal. There is no distension.      Palpations: Abdomen is soft.      Tenderness: There is no abdominal tenderness.   Musculoskeletal:         General: No swelling.      Cervical back: Neck supple.   Skin:     General: Skin is warm and dry.      Findings: No rash.   Neurological:      Mental Status: He is alert and oriented to person, place, and time.      Motor: Weakness present.      Coordination: Coordination abnormal.      Gait: Gait abnormal.   Psychiatric:         Mood and Affect: Mood normal.         Behavior: Behavior normal.         Thought Content: Thought content normal.          Results Review:  I have reviewed the labs, radiology results, and diagnostic studies.    Laboratory Data:   Results from last 7 days   Lab Units 05/30/25  0341 05/29/25  0552 05/28/25  0401   WBC 10*3/mm3 9.47 11.20* 8.79   HEMOGLOBIN g/dL 7.2* 7.0* 8.5*   HEMATOCRIT % 22.2* 22.0* 26.7*   PLATELETS 10*3/mm3 139* 108* 140        Results from last 7 days   Lab Units 05/30/25  0341 05/29/25  0241 05/28/25  0401 05/26/25  0309 05/25/25  0457   SODIUM mmol/L 134* 134* 137   < > 139   POTASSIUM mmol/L 4.2 3.9 4.1   < > 3.3*   CHLORIDE mmol/L 95* 97* 97*   < > 96*   CO2 mmol/L 25.0 26.0 27.0   < > 26.0   BUN mg/dL 52.5* 39.3* 47.5*   < > 43*   CREATININE mg/dL 4.22* 2.96* 3.92*   < > 3.75*   CALCIUM mg/dL 8.4*  "8.4* 8.9   < > 9.3   BILIRUBIN mg/dL  --   --   --   --  0.4   ALK PHOS U/L  --   --   --   --  102   ALT (SGPT) U/L  --   --   --   --  <5   AST (SGOT) U/L  --   --   --   --  20   GLUCOSE mg/dL 98 120* 106*   < > 138*    < > = values in this interval not displayed.       Culture Data:   No results found for: \"BLOODCX\", \"URINECX\", \"WOUNDCX\", \"MRSACX\", \"RESPCX\", \"STOOLCX\"    Radiology Data:   Imaging Results (Last 24 Hours)       Procedure Component Value Units Date/Time    IR Angiogram Extremity - In process [[id number]] Resulted: 05/30/25 1413     Updated: 05/30/25 1413    This result has not been signed. Information might be incomplete.      XR Chest 1 View [[id number]] Collected: 05/29/25 1529     Updated: 05/29/25 1533    Narrative:      EXAMINATION: XR CHEST 1 VW-  5/29/2025 3:29 PM 1 view     HISTORY: Shortness of breath; I70.90-Unspecified atherosclerosis;  Z99.2-Dependence on renal dialysis     COMPARISON: 4/20/2025     TECHNIQUE: A single frontal view of the chest was obtained.     FINDINGS:  Large bore central venous catheter on the RIGHT is redemonstrated.  Median sternotomy wires are again seen. Mild cardiomegaly and calcified  atherosclerotic vascular disease. Small pleural effusion on the RIGHT  with dense consolidation in the RIGHT midlung, new since 4/20/2025. This  may represent pneumonia but is nonspecific.       Impression:         1.  New airspace consolidation in the RIGHT midlung, concerning for  pneumonia.     2.  There is a small pleural effusion at the RIGHT lung base as well.     3.  Chronic changes as discussed above.           This report was signed and finalized on 5/29/2025 3:30 PM by Dr. Dmitri Aguilar MD.               I have reviewed the patient's current medications.     Assessment/Plan   Assessment  Active Hospital Problems    Diagnosis     **Arterial occlusion     Thrombocytopenia     Acute pain of left lower extremity     ESRD (end stage renal disease) on dialysis     Abnormal " TSH     Chronic diastolic (congestive) heart failure     Anticoagulated     Sensorineural hearing loss (SNHL), bilateral     Eustachian tube dysfunction, bilateral     Nasal septal deviation     Mixed hyperlipidemia     CLL (chronic lymphocytic leukemia)     Anemia due to chronic kidney disease     Diastolic dysfunction     Symptomatic anemia     Resistant hypertension     Peripheral arterial disease     IHD (ischemic heart disease)     Essential hypertension     Chronic rhinitis        Treatment Plan  Acute ischemic left lower extremity /left leg pain.  Vascular consult.  Status post surgery 5/22/2025-Right common femoral artery access under ultrasound guidance, left posterior tibial artery access ultrasound guidance, Left lower extremity arteriogram with third order selection angiographic interpretation, PTA of the left SFA with a 3 x 60 Tammy cross balloon, Shockwave lithotripsy of the left external iliac artery with a 6 x 60 shockwave balloon, Left external iliac artery stenting with a 8 x 60 ever flex stent postdilated with a 7 x 60 Ever Cross balloon  Plan for surgery today by vascular-left above-the-knee amputation.    Shortness of breath/Rales/ pneumonia.  COPD.  Not exacerbation.  DuoNebs.  Singulair.  Hypothyroidism.  Synthroid.  Incentive spirometer.  Zosyn.  Chest x-ray-New airspace consolidation in the RIGHT midlung- concerning for pneumonia, small pleural effusion at the RIGHT lung base, Chronic changes.  Patient is on room air.      Anemia. Status post 1 unit blood transfusion 5/24/25,   Hemoglobin 7.2 today, stable.  Patient planned to have surgery today.   Getting 1 unit of blood today.  Niferex . Procrit.     End-stage renal disease.  Dialysis patient.  Calcitriol . Creatinine increased.  Dialysis Monday Wednesday Friday.     Hyponatremia.  Sodium stable.     Hypokalemia.  Resolved. Magnesium-normal.     Thrombocytopenia.  Platelet increasing.     CAD/hypertension/hyperlipidemia/CHF.  Aspirin .  Lipitor.  Coreg . Catapres.  Plavix.  Procardia.  Hydralazine . Isordil . Aldactone.  Diovan . hydralazine as needed.  Nitro as needed.  Echocardiogram 1/11/2025- 56 - 60%,  mild septal asymmetric hypertrophy, diastolic function is consistent with (grade II w/high LAP) pseudonormalization, Normal right ventricular cavity size and systolic function, left atrial cavity is mild to moderately dilated, right atrial cavity is dilated, Mild aortic valve stenosis, Mild to moderate mitral valve regurgitation, Moderate to severe tricuspid valve regurgitation,  tricuspid regurgitation is markedly elevated (>55 mmHg).     Reflux . Protonix.  Compazine as needed.  Phenergan as needed.     Prostate hypertrophy.  Flomax.     Anxiety/depression.  Xanax as needed.     Pain.  Morphine as needed.  Percocet as needed.  Neurontin at Night.  Flexeril as needed.     Nutrition . Diabetic diet.   Boost supplement.  Fortified pudding.     Deconditioning.  PT consult.     Medical Decision Making  Number and Complexity of problems: Ischemic left leg/end-stage renal disease/hypokalemia/anemia/thrombocytopenia/CAD/CHF/COPD  Differential Diagnosis: None     Conditions and Status        Condition is unchanged.     [facility] Data  External documents reviewed: Previous note .  Cardiac tracing (EKG, telemetry) interpretation: No monitor  Radiology interpretation: Echo  Labs reviewed: Laboratory  Any tests that were considered but not ordered: Lab in a.m.     Decision rules/scores evaluated (example [id number], Wells, etc): None     Discussed with: Patient and daughter     Care Planning  Shared decision making: Patient and daughter  Code status and discussions: Full code     Disposition  Social Determinants of Health that impact treatment or disposition: From home  Patient probably need rehab placement.  1 to 4 days.        Electronically signed by Garrett Comer MD, 05/30/25, 14:26 CDT.

## 2025-05-30 NOTE — NURSING NOTE
[facility]  DIALYSIS TREATMENT SUMMARY     Note: Consult with the attending physician for patient treatment orders, this document is not a physician order.     Patient Information  Patient Ricardo Hugo  Date of Birth February 22, 1950  Chart Number [id number]  Location [facility]  Location MRN [id number]  Gender Male  SSN (last 4)   Treatment Information  Treatment Type Hemodialysis  Treatment Id [id number]  Start Time May 30, 2025 08:47  End Time May 30, 2025 12:18  Acutal Duration 03:31  Treatment Balances  Total Saline Administered 500  Net Fluid Balance -1500  Hemodialysis Orders  Therapy Standard  Orders Verified Time 05/30/2025 07:00   Date Verified 05/30/2025  Duration 3:30  Isolated UF/Bypass No  BFR (mL) 400  DFR (mL) 700  Dialyzer Type OPTIFLUX 160NR  UF Order UF Goal Ordered  UF Goal Ordered (mL) 1500  Crit-Line used No  Heparin Initial Units Bolus No  Heparin IV Maintenance Bolus No  Heparin IV Infusion No  Potassium (mEq/L) 3.0  Calcium (mEq/L) 2.5  Bicarb (mg/dL) 33  Sodium (mEq/L) 140  Clinician Erika Hines, DEMETRIUS  Dialysis Access  Access Type Central Venous Catheter  Central Venous Catheter  Access Type Catheter - Tunneled  Access Location Chest Wall - R  Current/New [facility] Patient Yes  1st Use Catheter Verified by Previous Use  Catheter Care Completed per Policy Yes  Dressing Dry and Intact on Arrival Yes  Dressing Changed No  Type of Dressing Film Biopatch/CHG  Last Date Changed 05/28/2025  If No select Reason Dressing Change Not Indicated per Policy  Type of HD Caps Curos  HD Caps Changed Yes  CVC Line Education Provided Yes - Dressing Change Frequency  Vitals  Pre-Treatment Vitals  Time Is BP being recorded? Pre BP Map BP Method Pulse RR Temp How was Weight Obtained? Pre Weight Previous Dry Weight Previous Post Weight Metric Target Fluid Removal (mL) Dialysate Confirmed Clinician  05/30/2025 08:46 BP/Map 163/61 (95) Noninvasive 56 14 98 How Obtained: Bed  Scale 62.3 63.9 - Kgs 2000 Yes Erika Hines RN  Comments: Patient treatment in dialysis suite. Patient resting quietly. Responds to verbal stimuli. Blood product in progress upon arrival. Dialysis dressing changed by HD RN. Patient denies pain or distress at this time.  Intra Procedure Vitals  Rec Type Time Is BP being recorded? BP Map Pulse RR BFR DFR AP  TMP UFR RMVD Bolus NSS Flush Chills Safety Check Crit-Line HCT Crit-Line BV% Clinician  E 05/30/2025 08:47 BP/Map 154/66 (95) 56 14 400 700 -150 130 30 570 0 Yes 250  Yes   Erika Hines RN  Comments: HD started via (R) IJ perm catheter, venous clip applied, prime given, lines secured. Patient tolerated well. Machine alarms passed, DFR at ordered rate. Treatment in progress.  E 05/30/2025 09:00 BP/Map 166/63 (97) 55 14 400 700 -150 130 30 570 172    Yes   Erika Hines RN  Comments: HD access visible, venous clips present, lines secured. Patient resting quietly. Treatment in progress. Will continue with current treatment plan at this time.  E 05/30/2025 09:30 BP/Map 167/72 (104) 60 14 400 700 -150 130 30 570 461    Yes   Erika Hines RN  Comments: HD access visible, venous clips present, lines secured. Patient resting quietly. Treatment in progress. Will continue with current treatment plan at this time.  E 05/30/2025 10:00 BP/Map 188/61 (103) 61 14 400 700 -150 130 30 570 794    Yes   Erika Hines RN  Comments: HD access visible, venous clips present, lines secured. Patient resting quietly. Treatment in progress. Will continue with current treatment plan at this time.  E 05/30/2025 10:17 BP/Map 168/66 (100) 56 14              Erika Hines RN  Comments: Blood product complete. temp 98 Total volume of 307 mL received.  E 05/30/2025 10:30 BP/Map 190/61 (104) 61 14 400 700 -150 130 30 570 954    Yes   Erika Hines RN  Comments: HD access visible, venous clips present, lines secured. Patient resting quietly. Treatment in progress. Will continue with  current treatment plan at this time. Primary nurse called for scheduled medication.  E 05/30/2025 11:00 BP/Map 184/73 (110) 63 16 400 700 -150 130 30 570 1286    Yes   Erika Hines, DEMETRIUS  Comments: HD access visible, venous clips present, lines secured. Patient resting quietly. Treatment in progress. Will continue with current treatment plan at this time.  E 05/30/2025 11:30 BP/Map 176/65 (102) 65 16 400 700 -170 150 30 570 1538       Erika Hines, DEMETRIUS  Comments: HD access visible, venous clips present, lines secured. Patient resting quietly. Treatment in progress. Will continue with current treatment plan at this time. Nephrology MD here rounding.  E 05/30/2025 12:00 BP/Map 175/83 (114) 69 18 400 700 -170 150 30 570 1914    Yes   Erika Hines, DEMETRIUS  Comments: HD access visible, venous clips present, lines secured. Patient resting quietly. Treatment in progress. Will continue with current treatment plan at this time.  E 05/30/2025 12:14 BP/Map 165/72 (103) 85 16       2000 Yes 250  Yes   Erika Hines, DEMETRIUS  Comments: Tx complete, blood returned.  Post Treatment Vitals  Time Is BP being recorded? BP Map Pulse RR Temp How was Weight Obtained? Post Weight Metric BVP UF Goal Ordered NSS Given Intra-Procedure Total Machine UF Removed (mL) Crit-Line Ending Profile Crit-Line Refill Crit-Line Ending HCT Crit-Line Max BV% Clinician  05/30/2025 12:22 BP/Map 169/77 (108) 85 16 98.2 How Obtained: Bed scale 62.4 Kgs 85.4 6920 495 5052     Erika Hines, DEMETRIUS  Comments: Tolerated treatment well today. Patient was hypertensive during treatment today.  Safety checks include: access uncovered and secured, Hemaclip secured for all central line access, machine checks performed, and alarm limits confirmed.  Labs  Hepatitis  HBsAG Lab Result HBsAG Lab Result HBsAG Draw Date Transient Antigenemia(MD Diagnosis Only) Anti-HBs Lab Result Anti-HBs Lab Value Anti-HBs Draw Date Documented By Documented Date Hepatitis Status Hepatitis  Status  Negative  05/05/2025  Negative  04/30/2025 Erika Hines 05/30/2025  Susceptible  Notes:   Pre-Treatment Hepatitis Precautions Copy of hepatitis results verified in hospital EMR Yes Signing  Patient tx outside buffer zone Yes Hepatitis Information Entered By Erika Hines RN Signed By Erika Hines RN  Pre-Treatment Handoff  Staff Report Received Yes  Report Given by Primary Nurse Isidro Chan RN  Time 08:00  Patient Arrival  Patient ID Verified Date of Birth  Full Name  Patient Consent to treatment verified Yes  Blood Transfusion Consent Verified N/A  Treatment Comments  Treatment Notes Hemodynamically stable post HD today.  Post-Treatment Handoff  Report Given to Primary Nurse Isidro Chan RN  Time Report Given 12:25  Report Given By Erika Hines RN  Machine Validation  Time 07:30  Date 5/30/2025  Auto Alarm Test Passed Yes  Machine Serial # [id number]  Portable RO Yes  RO Serial# 19  Residual Bleach Negative Yes  Was a manufactured mix used? Yes  Machine Log Completed Yes  Total Chlorine (less than 0.1)? Yes  Total Chlorine Log Completed Yes  Bicarb BiBag  Bibag Size 900  Machine Temp 36  Machine Conductivity 13.7  Meter Type N/A  Meter Conductivity   Independent Conductivity 13.8  pH Status Pass Pass  pH   TCD Value 13.9  TCD Alarm with +/- 0.5 Yes  NVL enabled validated 100 asymmetric Yes  Safety check complete Erika Hines RN  Second Verification Performed? No  Second Verification Performed By   Reason Not Verified No other staff members located at the hospital  Serum Lab Values  Time BUN Creatinine Na K (mEq/L) Cl CO2 Ca (mEq/L) Phos Mg (mg/dL) Alb (g/dL) Glucose Hgb Hct WBC Plt PT aPTT INR Other Clinician  05/30/2025 03:41 52.5 4.22 134 4.2 95 25 8.4 - - - 98 7.2 22.2 9.47 139 - - - - Erika Hines RN  Comments: LABS  Facility Information Room # 389 Diagnosis  Facility Information Bed # 1 Diagnosis Arterial occlusion  Admission Date 05/21/2025 Stat Treatment No Isolation  Information  Ordering MD Rueda Patient Type Chronic dialysis patient with diagnosis of ESRD Isolation Required? N  Account/Finance Number [id number] Patient Chronic Unit [facility] Completed by  Admission Status InPatient Patient Home Unit [facility] information Entered by Erika Hines RN  Location Dialysis Suite Multi Code Status Full Code   Start Treatment Time Out Confirmed by DEMETRIUS James/ DEMETRIUS Felix Correct access site verified Yes  Treatment Initiation Connections Secured Time Out Completed 08:40 Treatment Start Date 05/30/2025  Saline line double clamped Correct patient verified Yes Treatment Start Time 08:47  Hemaclip Applied Correct procedure verified Yes Patient/Family questions and concerns addressed Yes  Pre Focused Assessment Edema LOC Oriented to Person  Access Location Generalized GI / Bowels  Signs and Symptoms of Infection? No Edema Grade 1+ GI Bowel sounds present  Pain Screening Cardiac   Does the patient have pain? No Heart Rhythm Regular  Voids  Respiratory Telemetry No Completed by  Lung Sounds Diminished Skin Pre Treatment Focused Assessment Completed By Erika Hines RN  Location Bilateral Skin Warm Time 08:43  Position Anterior  Dry Signing  Respiratory Efforts Unlabored LOC Signed By Erika Hines RN  Education Method Knowledge / Understanding Assessed Teach back Method Knowledge / Understanding Assessed Teach back  Patient Education Family Education Provided? N/A Patient Education Introduced By  Patient Educated? Yes Caregiver Education Provided? Yes Patient Education Introduced By Erika Hines RN  Focus or Topic Hemodialysis treatment review Focus or Topic Hemodialysis treatment review   Post-Treatment Delay Note Transport slow after request entered. Notes Verbal to primary nurse.  Post Treatment Delay Machine Disinfection Requirement Completed by  Mai LEWIS HD machine external disinfection completed per policy Yes Post Treatment Form Completed By DEMETRIUS Berkowitz  Type Delay Due to Hospital Transportation PRO external disinfection completed per policy Yes   Delay Hours 0.50 Post Treatment General Information   Post Focused Assessment Lung Sounds Clear LOC  Changes from Pre Focused Assessment Location Bilateral LOC Oriented to Person  Changes from Pre Treatment Focused Assessment? No Position Anterior GI / Bowels  Access Respiratory Efforts Unlabored GI Bowel sounds present  Cath Packed with Hemparin Edema   Access Port(ml) 1.6 Location Generalized  Voids  Return Port(ml) 1.6 Edema Grade 1+ Completed by  Catheter clamped and capped Yes Cardiac Post Treatment Focused Assessment Completed by Erika Hines RN  Access Flow Good Heart Rhythm Regular Date 05/30/2025  Dialyzer Clearance Streaked Telemetry No Time 12:24  Pain Screening Skin Signing  Does the patient have pain? No Skin Warm Signed By Erika Hines RN  Respiratory  Dry

## 2025-05-30 NOTE — ANESTHESIA PREPROCEDURE EVALUATION
Anesthesia Evaluation     Patient summary reviewed   no history of anesthetic complications:   NPO Solid Status: > 8 hours             Airway   Mallampati: II  Small opening  Dental    (+) edentulous    Pulmonary    (+) a smoker Former, COPD,sleep apnea  (-) asthma  Cardiovascular     (+) hypertension, valvular problems/murmurs murmur and TI, CAD, CABG, CHF , PVD, hyperlipidemia,  carotid artery disease right carotid  (-) past MI, angina    ROS comment: Echo:  ·  Left ventricular systolic function is normal. Left ventricular ejection fraction appears to be 56 - 60%.  ·  Left ventricular wall thickness is consistent with mild septal asymmetric hypertrophy.  ·  Left ventricular diastolic function is consistent with (grade II w/high LAP) pseudonormalization.  ·  Normal right ventricular cavity size and systolic function noted.  ·  The left atrial cavity is mild to moderately dilated.  ·  The right atrial cavity is dilated.  ·  Mild aortic valve stenosis is present.  ·  Mild to moderate mitral valve regurgitation is present.  ·  Moderate to severe tricuspid valve regurgitation is present.  ·  Estimated right ventricular systolic pressure from tricuspid regurgitation is markedly elevated (>55 mmHg).      Neuro/Psych- negative ROS  (-) seizures, TIA, CVA  GI/Hepatic/Renal/Endo    (+) GERD, renal disease- CRI, ESRD and dialysis, thyroid problem hypothyroidism  (-) liver disease, diabetes    Musculoskeletal     Abdominal    Substance History      OB/GYN          Other      history of cancer                  Anesthesia Plan    ASA 4     MAC     (Stat BMP/CBC post dialysis)  intravenous induction     Anesthetic plan, risks, benefits, and alternatives have been provided, discussed and informed consent has been obtained with: patient.    CODE STATUS:    Code Status (Patient has no pulse and is not breathing): CPR (Attempt to Resuscitate)  Medical Interventions (Patient has pulse or is breathing): Full Support

## 2025-05-30 NOTE — CASE MANAGEMENT/SOCIAL WORK
Continued Stay Note  NANDINI Oneill     Patient Name: Ricardo Hugo  MRN: [id number]  Today's Date: 5/30/2025    Admit Date: 5/21/2025    Plan: Rehab   Discharge Plan       Row Name 05/30/25 1544       Plan    Plan Comments Events noted. SW will follow up with pt tomorrow due to pt being in OR at this time.                   Discharge Codes    No documentation.                 Expected Discharge Date and Time       Expected Discharge Date Expected Discharge Time    May 29, 2025               Jules Guerrero

## 2025-05-30 NOTE — ANESTHESIA POSTPROCEDURE EVALUATION
"Patient: Ricardo Hugo    Procedure Summary       Date: 05/30/25 Room / Location: [facility] OR  /  PAD HYBRID OR    Anesthesia Start: 1500 Anesthesia Stop: 1626    Procedure: AORTOILIAC ANGIOGRAM WITH LEFT LOWER EXTREMITY ANGIOGRAM (Right: Groin) Diagnosis:       Arterial occlusion      (Arterial occlusion [I70.90])    Surgeons: Gil Pineda DO Provider: Dale Garcia CRNA    Anesthesia Type: MAC ASA Status: 4            Anesthesia Type: MAC    Vitals  Vitals Value Taken Time   /47 05/30/25 16:26   Temp 98.1 °F (36.7 °C) 05/30/25 16:21   Pulse 70 05/30/25 16:26   Resp 15 05/30/25 16:21   SpO2 100 % 05/30/25 16:26   Vitals shown include unfiled device data.        Post Anesthesia Care and Evaluation    Patient location during evaluation: PACU  Patient participation: complete - patient participated  Level of consciousness: awake and alert  Pain management: adequate    Airway patency: patent  Anesthetic complications: No anesthetic complications    Cardiovascular status: acceptable  Respiratory status: acceptable  Hydration status: acceptable    Comments: Blood pressure 133/47, pulse 68, temperature 98.1 °F (36.7 °C), temperature source Temporal, resp. rate 15, height 182.9 cm (72\"), weight 62.3 kg (137 lb 5.6 oz), SpO2 100%.    Pt discharged from PACU based on jennifer score >8    "

## 2025-05-30 NOTE — PLAN OF CARE
Problem: Adult Inpatient Plan of Care  Goal: Plan of Care Review  Outcome: Progressing  Flowsheets (Taken 5/30/2025 0537)  Progress: no change  Outcome Evaluation: Pt complains of pain, see MAR. A&Ox4. [place]. NPO for Sx today. IV abx. Drsg intact. Safety maintained.  Plan of Care Reviewed With: patient

## 2025-05-30 NOTE — OP NOTE
Ricardo Hugo  5/30/2025     PREOPERATIVE DIAGNOSIS: Arterial occlusion [I70.90]     POSTOPERATIVE DIAGNOSIS: Post-Op Diagnosis Codes:     * Arterial occlusion [I70.90]     PROCEDURE PERFORMED:   1.  Introduction of catheter/sheath of the aorta  2.  Contralateral cannulation of the left common iliac artery  3.  Left lower extremity angiogram with radiographic supervision and interpretation  4.  Crossing of a below-knee popliteal artery chronic total occlusion  5.  Balloon angioplasty of the proximal SFA and below-knee popliteal artery chronic total occlusion using a 4 x 40 mm Medtronic drug-coated balloon  6.  Selective cannulation of the peroneal artery and anterior tibial artery  7.  Balloon angioplasty of the proximal anterior tibial artery and entire peroneal tibial artery and tibioperoneal trunk using a 2-1.5 x 210 mm  and 2.5-2 x 210 mm Tammy cross balloons  8.  Mynx closure of the right common femoral artery     SURGEON: Gil Pineda DO      ANESTHESIA: MAC    PREPARATION: Routine.    STAFF: Circulator: Emilia Kline RN; Yumiko Ramos RN  Scrub Person: Alban Robbins; Ruma Dunbar  Vascular Radiology Technician: Priscila Stacy; Lela Schulte    Estimated Blood Loss: minimal    SPECIMENS: None    COMPLICATIONS: None    INDICATIONS: Ricardo Hugo is a 75 y.o. male who is known to our practice for lower extremity PAD as well as recent PermCath placement.  He did recently undergo right lower extremity angiogram and pain has completely resolved to his right leg per his report.  He does state for the past week he has had significant pain to his left lower extremity as well as coolness.  He is a new Monday Wednesday Friday dialysis patient which just began about a month ago.  Past medical history significant for coronary artery disease, anemia, anxiety, arthritis, hearing loss, PAD, avascular necrosis of femoral head, carotid stenosis, coronary artery disease with bypass, COPD hypertension,  pernicious anemia, CLL.  He is maintained on aspirin and Plavix. The indications, risks, and possible complications of the procedure were explained to the patient, who voiced understanding and wished to proceed with surgery.     PROCEDURE IN DETAIL: The patient was taken to the operating room and placed on the operating table in a supine position. After general anesthesia was obtained, the bilateral groins was prepped and draped in a sterile manner.  5 cc of 0.5% Marcaine plain was used infiltrate the right groin for local anesthesia.  Using a micropuncture technique, the right common femoral artery was cannulated and a microsheath was placed.  The advantage Glidewire was advanced into the aorta and a short 6 [place] sheath was placed.  The patient was given 1000 units of intravenous heparin.  The Omni Flush catheter was advanced into the aorta and contralateral cannulation was established in the left common iliac artery.  The catheter was then brought down to the level of femoral head.  An angiogram with runoff was performed.  Findings are as follows:  Patent common femoral, profunda femoris, and superficial femoral artery.  There was a significant amount of plaque burden throughout all 3 of these.   Occluded below-knee popliteal artery down to just before the level of the anterior tibial artery takeoff  Two-vessel runoff to the foot by the anterior tibial and peroneal arteries.  These arteries were extremely atretic and small with very minimal runoff into the foot.  The posterior tibial artery is occluded.     At this point, the decision was made to try and revascularize all these areas.  A 6 [place] by 45 cm destination sheath was placed down into the common femoral artery.  The patient was given 5000 units of intravenous heparin.  With the help of the Buckley cross catheter, the below-knee popliteal artery  was traversed.  The below-knee popliteal artery  was balloon angioplastied with a 4 x 40 mm Tammy cross  balloon.  An 014 advantage Glidewire was exchanged.  The wire was cannulated all the way down to the bottom of the peroneal artery.  The  tibioperoneal trunk and entire peroneal artery were balloon angioplastied with a 2-1.5 x 210 mm and 2.5-2 x 210 mm Tammy cross balloons.  Next, selective cannulation was made into the anterior tibial artery and passed all the way to the mid calf.  Multiple attempts were made with these 2 balloons to gain access but were unsuccessful.  The proximal anterior tibial artery was the only area that we could balloon successfully.  At this point, I felt no further intervention was warranted.  The runoff vessels appeared to be much improved.  The sheath and wire were then removed and a minx device was used to seal off the right common femoral artery.  Direct pressure was held for an additional 10 to 15 minutes to help ensure hemostasis.  Sterile dressings were applied. The patient tolerated the procedure well. Sponge and needle counts were correct. The patient was then awakened in the operating room and taken to the recovery room in good condition.    Gil Pineda,   Date: 5/30/2025 Time: 16:08 CDT

## 2025-05-30 NOTE — PROGRESS NOTES
Assessment tool to be used for patients with existing breathing treatments ordered by hospitalist                               Respiratory Therapist Driven Protocol - RT to Assess and Treat Algorithm    Item 0 Points 1 Point 2 Points 3 Points 4 Points Subtotal   Mental Status Alert, orientated, cooperative Lethargic, follows commands Confused, not following commands Obtunded or Somnolent Comatose 0   Respiratory Pattern Regular RR  8-16 breaths/minute Increased RR  18-25 breaths/minute Dyspnea on exertion, irregular RR  26-30/minute Shortness of breath,  RR 31-35 breaths/minute Accessory muscle use, severe SOB  RR > 35 breath/minute 0   Breath Sounds Clear Decreased unilaterally Decreased bilaterally Basilar crackles Wheezing and/or rhonchi 0   Cough Strong, spontaneous non-productive Strong productive Weak, non-productive Weak productive or weak with rhonchi Absent or may require suctioning 0   Pulmonary Status Nonsmoker or no previous history > 1 year quit < 1 PPD  < 1 year quit >  or = 1 PPD Diagnosed pulmonary disease (severe or chronic) Severe or chronic pulmonary disease with exacerbation 3   Surgical Status None General surgery (non-abdominal or non-thoracic) Lower abdominal Thoracic or upper abdominal Thoracic with pulmonary disease 0   Chest X-ray Clear Chronic changes Infiltrates, atelectasis or pleural effusion Infiltrates > 1 lobe Diffuse infiltrates and atelectasis and/or effusions NA   Activity level Ambulatory Ambulatory with assistance Non-ambulatory Paraplegic Quadriplegic 1                     Total Score 4   Score    Drug Therapy Frequency  20 or >    Q4 Duoneb & Q3 Albuterol PRN 15 - 19     Q6 Duoneb & Q4 Albuterol PRN 10 - 14    QID Duoneb & Q4 Albuterol PRN 5 - 9    TID Duoneb & Q6 Albuterol PRN 0 - 4    Q4 PRN Duoneb or Q4 PRN Albuterol    Incentive Spirometry - Initial RT instruct    Lung Expansion Therapy (PEP) Bronchopulmonary Hygiene (CPT)   Q4 & PRN - Severe  atelectasis, poor oxygenation Q4 - copious secretions, dyspnea, unable to sleep, mucus plugging   QID - High risk for persistent atelectasis, existence of atelectasis QID & Q4 PRN - Moderate secretion production   TID - At risk for developing atelectasis TID - small amounts of secretions with poor cough   BID - prevention of atelectasis BID - unable to breathe deeply and cough spontaneously   *RT Protocol patients will be re-assessed/re-evaluated every 48 hours.    *Patients who are home nebulizer treatments will be protocoled to no less than their home regimen and will remain     on their home regimen with re-evaluations as needed with changes in patient condition.    RT Comments/Recommendations: CHART REVIEWED

## 2025-05-30 NOTE — PROGRESS NOTES
Nephrology ([facility]) Progress Note      Patient:  Ricardo Hugo  YOB: 1950  Date of Service: 5/30/2025  MRN: [id number]   Acct: [id number]   Primary Care Physician: Nathan Zimmer MD  Advance Directive:   Code Status and Medical Interventions: CPR (Attempt to Resuscitate); Full Support   Ordered at: 05/21/25 1822     Code Status (Patient has no pulse and is not breathing):    CPR (Attempt to Resuscitate)     Medical Interventions (Patient has pulse or is breathing):    Full Support     Admit Date: 5/21/2025       Hospital Day: 9  Referring Provider: No ref. provider found      Patient personally seen and examined.  Complete chart including Consults, Notes, Operative Reports, Labs, Cardiology, and Radiology studies reviewed as able.        Subjective:  Ricardo Hugo is a 75 y.o. male for whom we were consulted for evaluation and treatment of end stage renal disease on hemodialysis Monday Wednesday Friday.  Presented with left lower leg pain. Recently had right lower extremity angiogram which has significantly improved the pain he was having in his right leg. Dr Zabala took patient to OR on 5/22 for angiogram and stenting of left external iliac artery.  Has been tolerating inpatient HD well since arrival. Unfortunately pain in left leg has persisted and a left AKA has been considered. Tolerated HD well on 5/28    Today is drowsy. No new complaint or overnight issues. Still having frequent pain in left leg. Seen during HD and tolerating well  Dialysis   Patient was seen and evaluated on renal replacement therapy and I have personally evaluated the patient and directed the therapy  Modality: Hemodialysis  Access: Catheter  Location: right upper  QB: 400  QD: 700  UF: 1500      Allergies:  Ondansetron, Zofran [ondansetron hcl], Lortab [hydrocodone-acetaminophen], and Allopurinol    Home Meds:  Medications Prior to Admission   Medication Sig Dispense Refill Last  Dose/Taking    ALPRAZolam (XANAX) 0.25 MG tablet Take 1 tablet by mouth Every Night.   Taking    aspirin (aspirin) 81 MG EC tablet Take 1 tablet by mouth Daily.   Taking    atorvastatin (LIPITOR) 10 MG tablet Take 1 tablet by mouth Daily. 90 tablet 3 Taking    calcitriol (ROCALTROL) 0.5 MCG capsule Take 1 capsule by mouth Daily. 90 capsule 2 Taking    carvedilol (COREG) 25 MG tablet Take 1 tablet by mouth 2 (Two) Times a Day With Meals.   Taking    cholestyramine light 4 g packet Take 1 packet by mouth Daily. 90 packet 1 Taking    cloNIDine (CATAPRES) 0.3 MG tablet Take 1 tablet by mouth 3 times a day.   Taking    clopidogrel (PLAVIX) 75 MG tablet Take 1 tablet by mouth Daily.   Taking    Copper Gluconate (Copper Caps) 2 MG capsule Take 2 mg by mouth Daily.   Taking    empagliflozin (Jardiance) 10 MG tablet tablet Take 1 tablet by mouth Daily.   Taking    fexofenadine (ALLEGRA) 180 MG tablet Take 1 tablet by mouth Daily.   Taking    furosemide (LASIX) 40 MG tablet Take 1 tablet by mouth Daily. 90 tablet 2 Taking    hydrALAZINE (APRESOLINE) 100 MG tablet Take 1 tablet by mouth 3 (Three) Times a Day. 100mg daily   Taking    isosorbide dinitrate (ISORDIL) 10 MG tablet Take 1 tablet by mouth 3 (Three) Times a Day. 270 tablet 2 Taking    levothyroxine (Synthroid) 100 MCG tablet Take 1 tablet by mouth Every Morning. 90 tablet 2 Taking    magnesium chloride ER 64 MG DR tablet Take 1 tablet by mouth Daily.   Taking    Melatonin 10 MG tablet Take 1 tablet by mouth Every Night.   Taking    mesalamine (APRISO) 0.375 g 24 hr capsule Take 4 capsules by mouth Daily. 360 capsule 1 Taking    Multiple Vitamins-Minerals (PRESERVISION/LUTEIN) capsule Take 1 capsule by mouth 2 (two) times a day.   Taking    NIFEdipine XL (PROCARDIA XL) 60 MG 24 hr tablet Take 1 tablet by mouth Daily.   Taking    omeprazole (priLOSEC) 20 MG capsule Take 1 capsule by mouth Daily.   Taking    sodium bicarbonate 650 MG tablet Take 1 tablet by mouth 5  (Five) Times a Day.   Taking    spironolactone (ALDACTONE) 25 MG tablet Take 1 tablet by mouth Daily.   Taking    tamsulosin (FLOMAX) 0.4 MG capsule 24 hr capsule Take 1 capsule by mouth Every Night.   Taking    valsartan (DIOVAN) 320 MG tablet Take 1 tablet by mouth Daily.   Taking    vitamin D (ERGOCALCIFEROL) 1.25 MG (56032 UT) capsule capsule Take 1 capsule by mouth 1 (One) Time Per Week. Mondays   Taking    albuterol sulfate  (90 Base) MCG/ACT inhaler Inhale 2 puffs Every 6 (Six) Hours As Needed for Wheezing or Shortness of Air.       aspirin-acetaminophen-caffeine (EXCEDRIN MIGRAINE) 250-250-65 MG per tablet Take 1 tablet by mouth Every 6 (Six) Hours As Needed for Headache. (Patient taking differently: Take 3 tablets by mouth Every 6 (Six) Hours As Needed for Headache. Patient reports he takes 6-12 every day.)       Budeson-Glycopyrrol-Formoterol (Breztri Aerosphere) 160-9-4.8 MCG/ACT aerosol inhaler Inhale 2 puffs 2 (Two) Times a Day.       desoximetasone (TOPICORT) 0.25 % cream Apply 1 Application topically to the appropriate area as directed 2 (Two) Times a Day As Needed for Irritation. irritation       diphenhydrAMINE HCl, Sleep, 50 MG/30ML liquid Take 15 mL by mouth At Night As Needed (insomnia).       docusate sodium (COLACE) 100 MG capsule Take 1 capsule by mouth 3 (Three) Times a Day As Needed for Constipation.       fluticasone (FLONASE) 50 MCG/ACT nasal spray Administer 2 sprays into the nostril(s) as directed by provider Daily As Needed for Allergies.       montelukast (SINGULAIR) 10 MG tablet TAKE 1 TABLET EVERY NIGHT 90 tablet 3        Medicines:  Current Facility-Administered Medications   Medication Dose Route Frequency Provider Last Rate Last Admin    ALPRAZolam (XANAX) tablet 0.25 mg  0.25 mg Oral Nightly PRN Blayne Zabala MD   0.25 mg at 05/29/25 2039    aspirin EC tablet 81 mg  81 mg Oral Daily Blayne Zabala MD   81 mg at 05/29/25 0827    atorvastatin (LIPITOR) tablet 10 mg  10  mg Oral Daily Blayne Zabala MD   10 mg at 05/29/25 0826    sennosides-docusate (PERICOLACE) 8.6-50 MG per tablet 2 tablet  2 tablet Oral BID PRN Blayne Zabala MD   2 tablet at 05/25/25 0810    And    polyethylene glycol (MIRALAX) packet 17 g  17 g Oral Daily PRN Blayne Zabala MD        And    bisacodyl (DULCOLAX) EC tablet 5 mg  5 mg Oral Daily PRN Blayne Zabala MD   5 mg at 05/28/25 0634    And    bisacodyl (DULCOLAX) suppository 10 mg  10 mg Rectal Daily PRN Blayne Zabala MD        calcitriol (ROCALTROL) capsule 0.5 mcg  0.5 mcg Oral Daily Blayne Zabala MD   0.5 mcg at 05/29/25 0827    carvedilol (COREG) tablet 25 mg  25 mg Oral BID With Meals Blayne Zabala MD   25 mg at 05/28/25 1745    cloNIDine (CATAPRES) tablet 0.3 mg  0.3 mg Oral TID Blayne Zabala MD   0.3 mg at 05/29/25 2034    clopidogrel (PLAVIX) tablet 75 mg  75 mg Oral Daily Blayne Zabala MD   75 mg at 05/29/25 0828    cyclobenzaprine (FLEXERIL) tablet 5 mg  5 mg Oral TID PRN Garrett Comer MD   5 mg at 05/29/25 1251    epoetin cecile-epbx (RETACRIT) 5,000 Units 2 mL injection  5,000 Units Subcutaneous Once per day on Monday Wednesday Friday Garrett Comer MD   5,000 Units at 05/26/25 0925    gabapentin (NEURONTIN) capsule 300 mg  300 mg Oral Nightly Garrett Comer MD   300 mg at 05/29/25 2034    heparin (porcine) injection 3,200 Units  3,200 Units Intracatheter PRN Twan Quiroz MD   3,200 Units at 05/28/25 1205    heparin (porcine) injection 3,600 Units  3,600 Units Intravenous Once Twan Quiroz MD        hydrALAZINE (APRESOLINE) injection 10 mg  10 mg Intravenous Q6H PRN Blayne Zabala MD   10 mg at 05/22/25 0654    hydrALAZINE (APRESOLINE) tablet 100 mg  100 mg Oral TID Blayne Zabala MD   100 mg at 05/29/25 2034    ipratropium-albuterol (DUO-NEB) nebulizer solution 3 mL  3 mL Nebulization TID PRN Garrett Comer MD        iron polysaccharides (NIFEREX) capsule 150 mg  150 mg Oral Daily  Garrett Comer MD   150 mg at 05/29/25 0826    isosorbide dinitrate (ISORDIL) tablet 10 mg  10 mg Oral TID - Nitrates Blayne Zabala MD   10 mg at 05/28/25 1429    levothyroxine (SYNTHROID, LEVOTHROID) tablet 100 mcg  100 mcg Oral Q AM Blayne Zabala MD   100 mcg at 05/29/25 0553    montelukast (SINGULAIR) tablet 10 mg  10 mg Oral Nightly Blayne Zabala MD   10 mg at 05/29/25 2034    Morphine sulfate (PF) injection 1 mg  1 mg Intravenous Q4H PRN Garrett Comer MD   1 mg at 05/30/25 0337    NIFEdipine XL (PROCARDIA XL) 24 hr tablet 90 mg  90 mg Oral Daily Garrett Comer MD        nitroglycerin (NITROSTAT) SL tablet 0.4 mg  0.4 mg Sublingual Q5 Min PRN Blayne Zabala MD        oxyCODONE-acetaminophen (PERCOCET) 5-325 MG per tablet 1 tablet  1 tablet Oral Q4H PRN Garrett Comer MD   1 tablet at 05/29/25 2150    pantoprazole (PROTONIX) EC tablet 40 mg  40 mg Oral Q AM Blayne Zabala MD   40 mg at 05/29/25 0553    piperacillin-tazobactam (ZOSYN) 3.375 g IVPB in 100 mL NS MBP (CD)  3.375 g Intravenous Q12H Garrett Comer MD   3.375 g at 05/30/25 0607    prochlorperazine (COMPAZINE) injection 5 mg  5 mg Intravenous Q6H PRN Pancho Camacho MD   5 mg at 05/28/25 1752    Or    prochlorperazine (COMPAZINE) tablet 5 mg  5 mg Oral Q6H PRN Pancho Camacho MD        Or    prochlorperazine (COMPAZINE) suppository 25 mg  25 mg Rectal Q12H PRN Pancho Camacho MD        promethazine (PHENERGAN) 12.5 mg in sodium chloride 0.9 % 50 mL  12.5 mg Intravenous Q6H PRN Pancho Camacho MD        sodium chloride 0.9 % flush 10 mL  10 mL Intravenous PRN Blayne Zabala MD        sodium chloride 0.9 % flush 10 mL  10 mL Intravenous Q12H Blayne Zabala MD   10 mL at 05/29/25 2040    sodium chloride 0.9 % flush 10 mL  10 mL Intravenous PRN Blayne Zabala MD        sodium chloride 0.9 % flush 10 mL  10 mL Intravenous Q12H Blayne Zabala MD   10 mL at 05/29/25 2040    sodium chloride 0.9 % flush 10 mL  10 mL  Intravenous PRN Blayne Zabala MD        sodium chloride 0.9 % infusion 40 mL  40 mL Intravenous PRN Blayne Zabala MD        sodium chloride 0.9 % infusion 40 mL  40 mL Intravenous PRN Blayne Zabala MD        spironolactone (ALDACTONE) tablet 25 mg  25 mg Oral Daily Blayne Zabala MD   25 mg at 05/27/25 0759    tamsulosin (FLOMAX) 24 hr capsule 0.4 mg  0.4 mg Oral Nightly Blayne Zabala MD   0.4 mg at 05/29/25 2034    valsartan (DIOVAN) tablet 320 mg  320 mg Oral Daily Blayne Zabala MD   320 mg at 05/27/25 0756       Past Medical History:  Past Medical History:   Diagnosis Date    3-vessel CAD 08/11/2020    Allergic rhinitis     Anemia     Anxiety disorder 04/27/2020    Arthritis     Asymmetrical sensorineural hearing loss 06/28/2017    Atherosclerosis of native artery of both lower extremities with intermittent claudication 07/18/2019    Avascular necrosis of femoral head, left 07/11/2020    right hip after surgery    Carotid stenosis     Chronic mucoid otitis media     Chronic rhinitis     COPD (chronic obstructive pulmonary disease)     Coronary artery disease     HEART BYPASS 2004    Crohn's disease of large intestine with other complication 07/30/2020    Chronic diarrhea Colonoscopy July 2020 revealed mild patchy scattered hemosiderin staining with inflammation more so in rectosigmoid area.  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    Iron deficiency anemia 07/14/2020    Mixed hearing loss of left ear     PAD (peripheral artery disease) 08/11/2020    Perianal abscess     Pernicious anemia 08/17/2020    took shots but never diagnosed with  b12 deficiency    Personal history of alcoholism 08/11/2020    quit drinking in 2013    Prostatic hypertrophy 08/11/2020    Sensorineural hearing loss     Sepsis with acute renal failure 09/15/2020    Shortness of breath 05/27/2021    Tinnitus     Ventricular tachycardia, nonsustained 07/14/2020    Weight loss 07/11/2020       Past Surgical History:  Past Surgical History:   Procedure Laterality Date    AORTOGRAM Right 4/25/2025    Procedure: RIGHT LOWER EXTREMITY ANGIOGRAM, SHOCKWAVE LITHOTRIPSY, BALLOON ANGIOPLASTY, MYNX CLOSURE;  Surgeon: Gil Pineda DO;  [facility];  Service: Vascular;  Laterality: Right;    AORTOGRAM Left 5/22/2025    Procedure: LEFT LOWER EXTREMITY ANGIOGRAM, SHOCKWAVE LITHOTRIPSY, BALLOON ANGIOPLASTY, STENT PLACEMENT, MYNX CLOSURE;  Surgeon: Blayne Zabala MD;  Location: [facility] HYBRID OR;  Service: Vascular;  Laterality: Left;    ARTERY SURGERY  2021    right side on neck    CAROTID ENDARTERECTOMY Right 05/10/2021    Procedure: RIGHT CAROTID ENDARTERECTOMY WITH EEG;  Surgeon: Gil Pineda DO;  Location: [facility] HYBRID OR 12;  Service: Vascular;  Laterality: Right;    COLONOSCOPY N/A 07/02/2020    Procedure: COLONOSCOPY WITH ANESTHESIA;  Surgeon: Adrien Brewster MD;  Location: [facility] ENDOSCOPY;  Service: Gastroenterology;  Laterality: N/A;  pre op: diarrhea  post op: polyps  PCP: Joe Velasco MD    COLONOSCOPY N/A 10/13/2020    Procedure: COLONOSCOPY WITH ANESTHESIA;  Surgeon: Adrien Brewster MD;  Location: [facility] ENDOSCOPY;  Service: Gastroenterology;  Laterality: N/A;  Pre: Chronic Diarrhea, Crohn's  Post: AVM  Dr. Neftali Velasco  CO2 Inflation Used    COLONOSCOPY N/A 12/08/2023    Procedure: COLONOSCOPY WITH ANESTHESIA;  Surgeon: Adrien Brewster MD;  Location: [facility] ENDOSCOPY;  Service: Gastroenterology;  Laterality: N/A;  pre chrone's disease  post sub optimal prep, polyp, chrone's      CORONARY ARTERY BYPASS GRAFT  2003    x3     ENDOSCOPY N/A 2021    Procedure: ESOPHAGOGASTRODUODENOSCOPY WITH ANESTHESIA;  Surgeon: Bridger Bell MD;  Location: [facility] ENDOSCOPY;  Service: Gastroenterology;  Laterality: N/A;  pre anemia;gi bleed  post  gi bleed;schatski ring  Dr. ERIC Velasco    ENDOSCOPY N/A 10/10/2023    Procedure: ESOPHAGOGASTRODUODENOSCOPY WITH ANESTHESIA;  Surgeon: Adrien Brewster MD;  Location: [facility] ENDOSCOPY;  Service: Gastroenterology;  Laterality: N/A;  preop; anemia  postop esophagitis ; R/O barretts   PCP Randall Beata    EYE SURGERY Bilateral     catorac    INCISION AND DRAINAGE PERIRECTAL ABSCESS N/A 2017    Procedure: INCISION AND DRAINAGE OF JEET ANAL ABSCESS;  Surgeon: Lynette Smith MD;  Location: [facility] OR;  Service:     INGUINAL HERNIA REPAIR Bilateral 2023    Procedure: INGUINAL HERNIA BILATERAL REPAIR LAPAROSCOPIC WITH DAVINCI ROBOT WITH MESH;  Surgeon: Tahira Rivera MD;  Location: [facility] OR;  Service: Robotics - DaVinci;  Laterality: Bilateral;    INSERTION HEMODIALYSIS CATHETER N/A 2025    Procedure: HEMODIALYSIS CATHETER PLACEMENT;  Surgeon: Gil Pineda DO;  Location: [facility] HYBRID OR;  Service: Vascular;  Laterality: N/A;    MYRINGOTOMY W/ TUBES Left 2017    06/10/2016    TONSILLECTOMY      TOTAL HIP ARTHROPLASTY Right        Family History  Family History   Problem Relation Age of Onset    Breast cancer Mother     Dementia Father     Glaucoma Father     No Known Problems Daughter     Colon polyps Neg Hx     Colon cancer Neg Hx        Social History  Social History     Socioeconomic History    Marital status:    Tobacco Use    Smoking status: Former     Current packs/day: 0.00     Average packs/day: 0.5 packs/day for 25.8 years (12.9 ttl pk-yrs)     Types: Cigarettes     Start date:      Quit date: 10/13/2013     Years since quittin.6     Passive exposure: Past    Smokeless tobacco: Never    Tobacco comments:     quit 2013   Vaping Use    Vaping  status: Former    Substances: Nicotine    Devices: Pre-filled or refillable cartridge   Substance and Sexual Activity    Alcohol use: Not Currently    Drug use: No    Sexual activity: Not Currently     Partners: Female       Review of Systems:  History obtained from chart review and the patient  General ROS: No fever or chills  Respiratory ROS: No cough, shortness of breath, wheezing  Cardiovascular ROS: No chest pain or palpitations  Gastrointestinal ROS: No abdominal pain or melena  Genito-Urinary ROS: No dysuria or hematuria  Psych ROS: No anxiety and depression  14 point ROS reviewed with the patient and negative except as noted above and in the HPI unless unable to obtain.    Objective:  Patient Vitals for the past 24 hrs:   BP Temp Temp src Pulse Resp SpO2 Weight   05/30/25 0815 151/53 97.3 °F (36.3 °C) -- 56 16 98 % --   05/30/25 0802 136/64 97.7 °F (36.5 °C) Oral (!) 48 16 96 % --   05/30/25 0723 115/43 97.3 °F (36.3 °C) -- 63 16 100 % --   05/30/25 0657 -- -- -- 59 16 100 % --   05/30/25 0652 -- -- -- 66 16 94 % --   05/30/25 0323 144/48 98 °F (36.7 °C) Oral 67 16 92 % 62.3 kg (137 lb 5.6 oz)   05/29/25 2304 125/41 98.6 °F (37 °C) Oral 64 16 95 % --   05/29/25 2034 -- 99.7 °F (37.6 °C) Oral -- -- -- --   05/29/25 1955 -- -- -- 75 18 96 % --   05/1950 -- -- -- 70 16 94 % --   05/29/25 1943 165/48 -- Oral 72 16 92 % --   05/29/25 1650 146/51 98.9 °F (37.2 °C) Oral 68 16 95 % --   05/29/25 1431 -- -- -- 60 16 97 % --   05/29/25 1422 -- -- -- 58 16 96 % --   05/29/25 1140 138/61 98.9 °F (37.2 °C) Oral 111 16 -- --       Intake/Output Summary (Last 24 hours) at 5/30/2025 0911  Last data filed at 5/29/2025 2247  Gross per 24 hour   Intake 240 ml   Output 250 ml   Net -10 ml     General: awake/alert   Chest:  clear to auscultation bilaterally without respiratory distress  CVS: regular rate and rhythm  Abdominal: soft, nontender, positive bowel sounds  Extremities: no cyanosis or edema  Skin: warm and dry  without rash      Labs:  Results from last 7 days   Lab Units 05/30/25  0341 05/29/25  0552 05/28/25  0401   WBC 10*3/mm3 9.47 11.20* 8.79   HEMOGLOBIN g/dL 7.2* 7.0* 8.5*   HEMATOCRIT % 22.2* 22.0* 26.7*   PLATELETS 10*3/mm3 139* 108* 140         Results from last 7 days   Lab Units 05/30/25  0341 05/29/25  0241 05/28/25  0401 05/26/25  0309 05/25/25  0457   SODIUM mmol/L 134* 134* 137   < > 139   POTASSIUM mmol/L 4.2 3.9 4.1   < > 3.3*   CHLORIDE mmol/L 95* 97* 97*   < > 96*   CO2 mmol/L 25.0 26.0 27.0   < > 26.0   BUN mg/dL 52.5* 39.3* 47.5*   < > 43*   CREATININE mg/dL 4.22* 2.96* 3.92*   < > 3.75*   CALCIUM mg/dL 8.4* 8.4* 8.9   < > 9.3   EGFR mL/min/1.73 13.8* 21.1* 15.0*   < > 15.9*   BILIRUBIN mg/dL  --   --   --   --  0.4   ALK PHOS U/L  --   --   --   --  102   ALT (SGPT) U/L  --   --   --   --  <5   AST (SGOT) U/L  --   --   --   --  20   GLUCOSE mg/dL 98 120* 106*   < > 138*    < > = values in this interval not displayed.       Radiology:   Imaging Results (Last 72 Hours)       Procedure Component Value Units Date/Time    XR Chest 1 View [[id number]] Collected: 05/29/25 1529     Updated: 05/29/25 1533    Narrative:      EXAMINATION: XR CHEST 1 VW-  5/29/2025 3:29 PM 1 view     HISTORY: Shortness of breath; I70.90-Unspecified atherosclerosis;  Z99.2-Dependence on renal dialysis     COMPARISON: 4/20/2025     TECHNIQUE: A single frontal view of the chest was obtained.     FINDINGS:  Large bore central venous catheter on the RIGHT is redemonstrated.  Median sternotomy wires are again seen. Mild cardiomegaly and calcified  atherosclerotic vascular disease. Small pleural effusion on the RIGHT  with dense consolidation in the RIGHT midlung, new since 4/20/2025. This  may represent pneumonia but is nonspecific.       Impression:         1.  New airspace consolidation in the RIGHT midlung, concerning for  pneumonia.     2.  There is a small pleural effusion at the RIGHT lung base as well.     3.  Chronic changes  "as discussed above.           This report was signed and finalized on 5/29/2025 3:30 PM by Dr. Dmitri Aguilar MD.               Culture:  No results found for: \"BLOODCX\", \"URINECX\", \"WOUNDCX\", \"MRSACX\", \"RESPCX\", \"STOOLCX\"      Assessment    End stage renal disease on HD MWF  Hypertension  Anemia of CKD  Peripheral vascular disease--s/p angiogram on 5/22  Thrombocytopenia  Hypokalemia--improved  Hyponatremia     Plan:   Dialysis today  Planning for left lower extremity angiogram today      Slava Tate, APRN  5/30/2025  09:11 CDT    "

## 2025-05-31 LAB
ANION GAP SERPL CALCULATED.3IONS-SCNC: 12 MMOL/L (ref 5–15)
BH BB BLOOD EXPIRATION DATE: NORMAL
BH BB BLOOD TYPE BARCODE: 9500
BH BB DISPENSE STATUS: NORMAL
BH BB PRODUCT CODE: NORMAL
BH BB UNIT NUMBER: NORMAL
BUN SERPL-MCNC: 27.3 MG/DL (ref 8–23)
BUN/CREAT SERPL: 10.1 (ref 7–25)
CALCIUM SPEC-SCNC: 7.9 MG/DL (ref 8.6–10.5)
CHLORIDE SERPL-SCNC: 102 MMOL/L (ref 98–107)
CO2 SERPL-SCNC: 23 MMOL/L (ref 22–29)
CREAT SERPL-MCNC: 2.69 MG/DL (ref 0.76–1.27)
CROSSMATCH INTERPRETATION: NORMAL
DEPRECATED RDW RBC AUTO: 63 FL (ref 37–54)
EGFRCR SERPLBLD CKD-EPI 2021: 23.6 ML/MIN/1.73
ERYTHROCYTE [DISTWIDTH] IN BLOOD BY AUTOMATED COUNT: 18.3 % (ref 12.3–15.4)
GLUCOSE SERPL-MCNC: 97 MG/DL (ref 65–99)
HCT VFR BLD AUTO: 22.3 % (ref 37.5–51)
HGB BLD-MCNC: 7.4 G/DL (ref 13–17.7)
MCH RBC QN AUTO: 32.3 PG (ref 26.6–33)
MCHC RBC AUTO-ENTMCNC: 33.2 G/DL (ref 31.5–35.7)
MCV RBC AUTO: 97.4 FL (ref 79–97)
PLATELET # BLD AUTO: 112 10*3/MM3 (ref 140–450)
PMV BLD AUTO: 10.2 FL (ref 6–12)
POTASSIUM SERPL-SCNC: 4.1 MMOL/L (ref 3.5–5.2)
RBC # BLD AUTO: 2.29 10*6/MM3 (ref 4.14–5.8)
SODIUM SERPL-SCNC: 137 MMOL/L (ref 136–145)
UNIT  ABO: NORMAL
UNIT  RH: NORMAL
WBC NRBC COR # BLD AUTO: 7.12 10*3/MM3 (ref 3.4–10.8)

## 2025-05-31 PROCEDURE — 85027 COMPLETE CBC AUTOMATED: CPT | Performed by: SURGERY

## 2025-05-31 PROCEDURE — 80048 BASIC METABOLIC PNL TOTAL CA: CPT | Performed by: SURGERY

## 2025-05-31 PROCEDURE — 25010000002 PIPERACILLIN SOD-TAZOBACTAM PER 1 G: Performed by: SURGERY

## 2025-05-31 RX ORDER — HYDRALAZINE HYDROCHLORIDE 50 MG/1
50 TABLET, FILM COATED ORAL 3 TIMES DAILY
Status: DISCONTINUED | OUTPATIENT
Start: 2025-05-31 | End: 2025-06-05

## 2025-05-31 RX ADMIN — SENNOSIDES, DOCUSATE SODIUM 2 TABLET: 50; 8.6 TABLET, FILM COATED ORAL at 20:11

## 2025-05-31 RX ADMIN — OXYCODONE HYDROCHLORIDE AND ACETAMINOPHEN 1 TABLET: 5; 325 TABLET ORAL at 15:25

## 2025-05-31 RX ADMIN — ATORVASTATIN CALCIUM 10 MG: 10 TABLET, FILM COATED ORAL at 10:01

## 2025-05-31 RX ADMIN — Medication 10 ML: at 20:14

## 2025-05-31 RX ADMIN — PANTOPRAZOLE SODIUM 40 MG: 40 TABLET, DELAYED RELEASE ORAL at 06:33

## 2025-05-31 RX ADMIN — CALCITRIOL CAPSULES 0.25 MCG 0.5 MCG: 0.25 CAPSULE ORAL at 10:02

## 2025-05-31 RX ADMIN — CLOPIDOGREL BISULFATE 75 MG: 75 TABLET, FILM COATED ORAL at 10:01

## 2025-05-31 RX ADMIN — Medication 10 ML: at 10:02

## 2025-05-31 RX ADMIN — TAMSULOSIN HYDROCHLORIDE 0.4 MG: 0.4 CAPSULE ORAL at 20:19

## 2025-05-31 RX ADMIN — ISOSORBIDE DINITRATE 10 MG: 10 TABLET ORAL at 10:01

## 2025-05-31 RX ADMIN — OXYCODONE HYDROCHLORIDE AND ACETAMINOPHEN 1 TABLET: 5; 325 TABLET ORAL at 04:12

## 2025-05-31 RX ADMIN — MONTELUKAST SODIUM 10 MG: 10 TABLET, COATED ORAL at 20:22

## 2025-05-31 RX ADMIN — PIPERACILLIN AND TAZOBACTAM 3.38 G: 3; .375 INJECTION, POWDER, FOR SOLUTION INTRAVENOUS at 18:38

## 2025-05-31 RX ADMIN — GABAPENTIN 300 MG: 300 CAPSULE ORAL at 20:21

## 2025-05-31 RX ADMIN — OXYCODONE HYDROCHLORIDE AND ACETAMINOPHEN 1 TABLET: 5; 325 TABLET ORAL at 10:01

## 2025-05-31 RX ADMIN — CYCLOBENZAPRINE 5 MG: 10 TABLET, FILM COATED ORAL at 11:19

## 2025-05-31 RX ADMIN — PIPERACILLIN AND TAZOBACTAM 3.38 G: 3; .375 INJECTION, POWDER, FOR SOLUTION INTRAVENOUS at 06:33

## 2025-05-31 RX ADMIN — ALPRAZOLAM 0.25 MG: 0.25 TABLET ORAL at 20:21

## 2025-05-31 RX ADMIN — HYDRALAZINE HYDROCHLORIDE 100 MG: 50 TABLET ORAL at 10:01

## 2025-05-31 RX ADMIN — LEVOTHYROXINE SODIUM 100 MCG: 0.1 TABLET ORAL at 06:33

## 2025-05-31 RX ADMIN — Medication 150 MG: at 10:01

## 2025-05-31 RX ADMIN — ASPIRIN 81 MG: 81 TABLET, COATED ORAL at 10:01

## 2025-05-31 NOTE — PLAN OF CARE
Goal Outcome Evaluation:              Outcome Evaluation: Patient reports improved pain since his most recent surgery. had long converstation with patient and family about apporpriate Opioid use and how to lower risk of negative side effects of opioids such as overdose. Family still remains persistent to give IV morphine to patient even if patient is sleeping to not wake him up and give it to him anyway. Edcuated family that i would not be doing that for patient safety reasons. Patient needs to be awake and alert enough to request and safely take medication. Patient family would call out for pain medication for patient as he struggles to use call light. Upon arrival of staff patient was asleep and drowsy, thus pain medication was returned. Patient and family are also requesting that patient be discharged to rehab instead of going home for his safety and further care needs.

## 2025-05-31 NOTE — NURSING NOTE
Discussed with patient son about patient recent request and change of mind to go home with out HH or SNF. Son and patient daughter discussed and would ideally like patient to go to [facility]. Family stated they would rediscuss the topic with the patient when he wakes up.

## 2025-05-31 NOTE — PROGRESS NOTES
[facility] Medicine Services  INPATIENT PROGRESS NOTE    Patient Name: Ricardo Hugo  Date of Admission: 5/21/2025  Today's Date: 05/31/25  Length of Stay: 10  Primary Care Physician: Nathan Zimmer MD    Subjective   Chief Complaint: Peripheral vascular disease/pain/dialysis/anemia/thrombocytopenia   HPI   Blood pressure is low but stable to slightly bradycardia, afebrile.  Patient still extremely deconditioned and cachectic.  Patient will need rehab placement.  Patient agreed to go to rehab.  Creatinine increased.  White blood cells normal.  Hemoglobin stable.  Platelet count stable.  Patient is on room air.    Review of Systems   Constitutional:  Positive for activity change, appetite change and fatigue. Negative for chills and fever.   HENT:  Negative for hearing loss, nosebleeds, tinnitus and trouble swallowing.    Eyes:  Negative for visual disturbance.   Respiratory:  Negative for cough, chest tightness, shortness of breath and wheezing.    Cardiovascular:  Negative for chest pain, palpitations and leg swelling.   Gastrointestinal:  Negative for abdominal distention, abdominal pain, blood in stool, constipation, diarrhea, nausea and vomiting.   Endocrine: Negative for cold intolerance, heat intolerance, polydipsia, polyphagia and polyuria.   Genitourinary:  Negative for decreased urine volume, difficulty urinating, dysuria, flank pain, frequency and hematuria.   Musculoskeletal:  Positive for arthralgias, gait problem and myalgias. Negative for joint swelling.   Skin:  Negative for rash.   Allergic/Immunologic: Negative for immunocompromised state.   Neurological:  Positive for weakness. Negative for dizziness, syncope, light-headedness and headaches.   Hematological:  Negative for adenopathy. Does not bruise/bleed easily.   Psychiatric/Behavioral:  Negative for confusion and sleep disturbance. The patient is not nervous/anxious.   All pertinent negatives and  positives are as above. All other systems have been reviewed and are negative unless otherwise stated.     Objective    Temp:  [97.5 °F (36.4 °C)-99.1 °F (37.3 °C)] 98.8 °F (37.1 °C)  Heart Rate:  [58-90] 58  Resp:  [14-17] 16  BP: (106-142)/(38-49) 110/48  Physical Exam  Vitals and nursing note reviewed.   Constitutional:       Comments: Advanced age.  Cachectic.  Chronically ill.   HENT:      Head: Normocephalic.   Eyes:      Conjunctiva/sclera: Conjunctivae normal.      Pupils: Pupils are equal, round, and reactive to light.   Cardiovascular:      Rate and Rhythm: Normal rate and regular rhythm.      Heart sounds: Normal heart sounds.   Pulmonary:      Effort: No respiratory distress.      Comments: Diminished breath sound bilateral, clear, on room air.  Abdominal:      General: Bowel sounds are normal. There is no distension.      Palpations: Abdomen is soft.      Tenderness: There is no abdominal tenderness.   Musculoskeletal:         General: No swelling.      Cervical back: Neck supple.   Skin:     General: Skin is warm and dry.      Findings: No rash.   Neurological:      Mental Status: He is alert and oriented to person, place, and time.      Motor: Weakness present.      Coordination: Coordination abnormal.      Gait: Gait abnormal.   Psychiatric:         Mood and Affect: Mood normal.         Behavior: Behavior normal.         Thought Content: Thought content normal.       Results Review:  I have reviewed the labs, radiology results, and diagnostic studies.    Laboratory Data:   Results from last 7 days   Lab Units 05/31/25  0431 05/30/25  1426 05/30/25  0341   WBC 10*3/mm3 7.12 7.53 9.47   HEMOGLOBIN g/dL 7.4* 7.5* 7.2*   HEMATOCRIT % 22.3* 23.0* 22.2*   PLATELETS 10*3/mm3 112* 114* 139*        Results from last 7 days   Lab Units 05/31/25  0431 05/30/25  1443 05/30/25  1426 05/30/25  0341 05/26/25  0309 05/25/25  0457   SODIUM mmol/L 137  --  138 134*   < > 139   SODIUM, ARTERIAL mmol/L  --  137  --   --  "  --   --    POTASSIUM mmol/L 4.1  --  3.8 4.2   < > 3.3*   CHLORIDE mmol/L 102  --  101 95*   < > 96*   CO2 mmol/L 23.0  --  26.0 25.0   < > 26.0   BUN mg/dL 27.3*  --  19.5 52.5*   < > 43*   CREATININE mg/dL 2.69*  --  1.86* 4.22*   < > 3.75*   CALCIUM mg/dL 7.9*  --  8.4* 8.4*   < > 9.3   BILIRUBIN mg/dL  --   --   --   --   --  0.4   ALK PHOS U/L  --   --   --   --   --  102   ALT (SGPT) U/L  --   --   --   --   --  <5   AST (SGOT) U/L  --   --   --   --   --  20   GLUCOSE mg/dL 97  --  102* 98   < > 138*    < > = values in this interval not displayed.       Culture Data:   No results found for: \"BLOODCX\", \"URINECX\", \"WOUNDCX\", \"MRSACX\", \"RESPCX\", \"STOOLCX\"    Radiology Data:   Imaging Results (Last 24 Hours)       Procedure Component Value Units Date/Time    FL C Arm During Surgery [[id number]] Resulted: 05/30/25 1615     Updated: 05/30/25 1615    Narrative:      This procedure was auto-finalized with no dictation required.    IR Angiogram Extremity - In process [[id number]] Resulted: 05/30/25 1413     Updated: 05/30/25 1615    This result has not been signed. Information might be incomplete.              I have reviewed the patient's current medications.     Assessment/Plan   Assessment  Active Hospital Problems    Diagnosis     **Arterial occlusion     Thrombocytopenia     Acute pain of left lower extremity     ESRD (end stage renal disease) on dialysis     Abnormal TSH     Chronic diastolic (congestive) heart failure     Anticoagulated     Sensorineural hearing loss (SNHL), bilateral     Eustachian tube dysfunction, bilateral     Nasal septal deviation     Mixed hyperlipidemia     CLL (chronic lymphocytic leukemia)     Anemia due to chronic kidney disease     Diastolic dysfunction     Symptomatic anemia     Resistant hypertension     Peripheral arterial disease     IHD (ischemic heart disease)     Essential hypertension     Chronic rhinitis        Treatment Plan  Acute ischemic left lower extremity /left leg " pain.  Vascular consult.  Aspirin . Plavix.  Status post surgery 5/22/2025-Right common femoral artery access under ultrasound guidance, left posterior tibial artery access ultrasound guidance, Left lower extremity arteriogram with third order selection angiographic interpretation, PTA of the left SFA with a 3 x 60 Tammy cross balloon, Shockwave lithotripsy of the left external iliac artery with a 6 x 60 shockwave balloon, Left external iliac artery stenting with a 8 x 60 ever flex stent postdilated with a 7 x 60 Ever Cross balloon  Status post 5/30/2025-Introduction of catheter/sheath of the aorta, Contralateral cannulation of the left common iliac artery, Left lower extremity angiogram with radiographic supervision, Crossing of a below-knee popliteal artery chronic total occlusion, Balloon angioplasty of the proximal SFA and below-knee popliteal artery chronic total occlusion using a 4 x 40 mm Medtronic drug-coated balloon, Selective cannulation of the peroneal artery and anterior tibial artery, Balloon angioplasty of the proximal anterior tibial artery and entire peroneal tibial artery and tibioperoneal trunk using a 2-1.5 x 210 mm  and 2.5-2 x 210 mm Tammy cross balloons, Mynx closure of the right common femoral artery  Vascular recommendation follow-up in 2 weeks.     Shortness of breath/Rales/ pneumonia.  COPD.  Not exacerbation.  DuoNebs.  Singulair.  Hypothyroidism.  Synthroid.  Incentive spirometer.  Zosyn.  Chest x-ray-New airspace consolidation in the RIGHT midlung- concerning for pneumonia, small pleural effusion at the RIGHT lung base, Chronic changes.  Patient is on room air.      CAD/hypertension/hyperlipidemia/CHF.  Aspirin . Lipitor.  Coreg . Catapres.  Plavix.  Procardia.  Hold hydralazine blood pressure is on the low side. Isordil . Aldactone.  Diovan . hydralazine as needed.  Nitro as needed.  Echocardiogram 1/11/2025- 56 - 60%,  mild septal asymmetric hypertrophy, diastolic function is consistent  with (grade II w/high LAP) pseudonormalization, Normal right ventricular cavity size and systolic function, left atrial cavity is mild to moderately dilated, right atrial cavity is dilated, Mild aortic valve stenosis, Mild to moderate mitral valve regurgitation, Moderate to severe tricuspid valve regurgitation,  tricuspid regurgitation is markedly elevated (>55 mmHg).     Anemia.  Status post 2 units of blood transfusion.  Hemoglobin stable.   Niferex . Procrit.     End-stage renal disease.  Dialysis patient.  Calcitriol . Creatinine increased.  Dialysis Monday Wednesday Friday.     Hyponatremia.  Sodium stable.     Hypokalemia.  Resolved. Magnesium-normal.     Thrombocytopenia.  Platelet stable.     Reflux . Protonix.  Compazine as needed.  Phenergan as needed.     Prostate hypertrophy.  Flomax.     Anxiety/depression.  Xanax as needed.     Pain.  Morphine as needed.  Percocet as needed.  Neurontin at Night.  Flexeril as needed.     Nutrition . Diabetic diet.   Boost supplement.  Fortified pudding.     Deconditioning.  PT consult.  Patient gets around a walker at baseline.    Patient lives by himself.  Patient is mostly bedbound at this point.  Patient will need to go to rehab.   consult.     Medical Decision Making  Number and Complexity of problems: Ischemic left leg/end-stage renal disease/hypokalemia/anemia/thrombocytopenia/CAD/CHF/COPD  Differential Diagnosis: None     Conditions and Status        Condition is unchanged.     [facility] Data  External documents reviewed: Previous note .  Cardiac tracing (EKG, telemetry) interpretation: No monitor  Radiology interpretation: Echo  Labs reviewed: Laboratory  Any tests that were considered but not ordered: Lab in a.m.     Decision rules/scores evaluated (example [id number], Wells, etc): None     Discussed with: Patient and daughter     Care Planning  Shared decision making: Patient and daughter  Code status and discussions: Full code     Disposition  Social  Determinants of Health that impact treatment or disposition: From home  Patient will need rehab placement.              Electronically signed by Garrett Comer MD, 05/31/25, 12:31 CDT.

## 2025-05-31 NOTE — CASE MANAGEMENT/SOCIAL WORK
Continued Stay Note   [place]     Patient Name: Ricardo Hugo  MRN: [id number]  Today's Date: 5/31/2025    Admit Date: 5/21/2025    Plan: SNF vs Home   Discharge Plan       Row Name 05/31/25 1222       Plan    Plan SNF vs Home    Plan Comments Pt stated he does not need snf or hh and will be just fine at home. Pt had HD MWF at [facility], which is only a few miles from his home, but transportation is an issue. SW provided pts friend who was in the room with a list of some local transportation services. Pt has DME. Sw will continue to follow and assist s needed.                   Discharge Codes    No documentation.                       Jules Guerrero

## 2025-05-31 NOTE — PLAN OF CARE
Goal Outcome Evaluation:           Progress: no change                              Patient medicated for pain, see mar. IV ABX. No distress noted.

## 2025-05-31 NOTE — PROGRESS NOTES
Nephrology ([facility]) Progress Note      Patient:  Ricardo Hugo  YOB: 1950  Date of Service: 5/31/2025  MRN: [id number]   Acct: [id number]   Primary Care Physician: Nathan Zimmer MD  Advance Directive:   Code Status and Medical Interventions: CPR (Attempt to Resuscitate); Full Support   Ordered at: 05/21/25 1822     Code Status (Patient has no pulse and is not breathing):    CPR (Attempt to Resuscitate)     Medical Interventions (Patient has pulse or is breathing):    Full Support     Admit Date: 5/21/2025       Hospital Day: 10  Referring Provider: No ref. provider found      Patient personally seen and examined.  Complete chart including Consults, Notes, Operative Reports, Labs, Cardiology, and Radiology studies reviewed as able.        Subjective:  Ricardo Hugo is a 75 y.o. male for whom we were consulted for evaluation and treatment of end stage renal disease on hemodialysis Monday Wednesday Friday.  Presented with left lower leg pain. Recently had right lower extremity angiogram which has significantly improved the pain he was having in his right leg. Dr Zabala took patient to OR on 5/22 for angiogram and stenting of left external iliac artery.  Has been tolerating inpatient HD well since arrival. Unfortunately pain in left leg has persisted and a left AKA has been considered. Tolerated HD well on 5/28.    Today, patient is drowsy.  He is status post angiogram with balloon angioplasty of the left proximal SFA and below-knee popliteal artery as well as the proximal anterior tibial artery and entire peroneal tibial artery and tibioperoneal trunk on May 30th.  Has also received dialysis that day.  He was complaining of persistent left foot pain.      Allergies:  Ondansetron, Zofran [ondansetron hcl], Lortab [hydrocodone-acetaminophen], and Allopurinol    Home Meds:  Medications Prior to Admission   Medication Sig Dispense Refill Last Dose/Taking    ALPRAZolam  (XANAX) 0.25 MG tablet Take 1 tablet by mouth Every Night.   Taking    aspirin (aspirin) 81 MG EC tablet Take 1 tablet by mouth Daily.   Taking    atorvastatin (LIPITOR) 10 MG tablet Take 1 tablet by mouth Daily. 90 tablet 3 Taking    calcitriol (ROCALTROL) 0.5 MCG capsule Take 1 capsule by mouth Daily. 90 capsule 2 Taking    carvedilol (COREG) 25 MG tablet Take 1 tablet by mouth 2 (Two) Times a Day With Meals.   Taking    cholestyramine light 4 g packet Take 1 packet by mouth Daily. 90 packet 1 Taking    cloNIDine (CATAPRES) 0.3 MG tablet Take 1 tablet by mouth 3 times a day.   Taking    clopidogrel (PLAVIX) 75 MG tablet Take 1 tablet by mouth Daily.   Taking    Copper Gluconate (Copper Caps) 2 MG capsule Take 2 mg by mouth Daily.   Taking    empagliflozin (Jardiance) 10 MG tablet tablet Take 1 tablet by mouth Daily.   Taking    fexofenadine (ALLEGRA) 180 MG tablet Take 1 tablet by mouth Daily.   Taking    furosemide (LASIX) 40 MG tablet Take 1 tablet by mouth Daily. 90 tablet 2 Taking    hydrALAZINE (APRESOLINE) 100 MG tablet Take 1 tablet by mouth 3 (Three) Times a Day. 100mg daily   Taking    isosorbide dinitrate (ISORDIL) 10 MG tablet Take 1 tablet by mouth 3 (Three) Times a Day. 270 tablet 2 Taking    levothyroxine (Synthroid) 100 MCG tablet Take 1 tablet by mouth Every Morning. 90 tablet 2 Taking    magnesium chloride ER 64 MG DR tablet Take 1 tablet by mouth Daily.   Taking    Melatonin 10 MG tablet Take 1 tablet by mouth Every Night.   Taking    mesalamine (APRISO) 0.375 g 24 hr capsule Take 4 capsules by mouth Daily. 360 capsule 1 Taking    Multiple Vitamins-Minerals (PRESERVISION/LUTEIN) capsule Take 1 capsule by mouth 2 (two) times a day.   Taking    NIFEdipine XL (PROCARDIA XL) 60 MG 24 hr tablet Take 1 tablet by mouth Daily.   Taking    omeprazole (priLOSEC) 20 MG capsule Take 1 capsule by mouth Daily.   Taking    sodium bicarbonate 650 MG tablet Take 1 tablet by mouth 5 (Five) Times a Day.   Taking     spironolactone (ALDACTONE) 25 MG tablet Take 1 tablet by mouth Daily.   Taking    tamsulosin (FLOMAX) 0.4 MG capsule 24 hr capsule Take 1 capsule by mouth Every Night.   Taking    valsartan (DIOVAN) 320 MG tablet Take 1 tablet by mouth Daily.   Taking    vitamin D (ERGOCALCIFEROL) 1.25 MG (22510 UT) capsule capsule Take 1 capsule by mouth 1 (One) Time Per Week. Mondays   Taking    albuterol sulfate  (90 Base) MCG/ACT inhaler Inhale 2 puffs Every 6 (Six) Hours As Needed for Wheezing or Shortness of Air.       aspirin-acetaminophen-caffeine (EXCEDRIN MIGRAINE) 250-250-65 MG per tablet Take 1 tablet by mouth Every 6 (Six) Hours As Needed for Headache. (Patient taking differently: Take 3 tablets by mouth Every 6 (Six) Hours As Needed for Headache. Patient reports he takes 6-12 every day.)       Budeson-Glycopyrrol-Formoterol (Breztri Aerosphere) 160-9-4.8 MCG/ACT aerosol inhaler Inhale 2 puffs 2 (Two) Times a Day.       desoximetasone (TOPICORT) 0.25 % cream Apply 1 Application topically to the appropriate area as directed 2 (Two) Times a Day As Needed for Irritation. irritation       diphenhydrAMINE HCl, Sleep, 50 MG/30ML liquid Take 15 mL by mouth At Night As Needed (insomnia).       docusate sodium (COLACE) 100 MG capsule Take 1 capsule by mouth 3 (Three) Times a Day As Needed for Constipation.       fluticasone (FLONASE) 50 MCG/ACT nasal spray Administer 2 sprays into the nostril(s) as directed by provider Daily As Needed for Allergies.       montelukast (SINGULAIR) 10 MG tablet TAKE 1 TABLET EVERY NIGHT 90 tablet 3        Medicines:  Current Facility-Administered Medications   Medication Dose Route Frequency Provider Last Rate Last Admin    ALPRAZolam (XANAX) tablet 0.25 mg  0.25 mg Oral Nightly PRN BickingGil DO   0.25 mg at 05/30/25 2031    aspirin EC tablet 81 mg  81 mg Oral Daily BickingGil K, DO   81 mg at 05/31/25 1001    atorvastatin (LIPITOR) tablet 10 mg  10 mg Oral Daily Bicking,  Gil K, DO   10 mg at 05/31/25 1001    sennosides-docusate (PERICOLACE) 8.6-50 MG per tablet 2 tablet  2 tablet Oral BID PRN BicGil donald DO   2 tablet at 05/25/25 0810    And    polyethylene glycol (MIRALAX) packet 17 g  17 g Oral Daily PRN BickingGil, DO        And    bisacodyl (DULCOLAX) EC tablet 5 mg  5 mg Oral Daily PRN BickingGil, DO   5 mg at 05/28/25 0634    And    bisacodyl (DULCOLAX) suppository 10 mg  10 mg Rectal Daily PRN BickingGil DO        calcitriol (ROCALTROL) capsule 0.5 mcg  0.5 mcg Oral Daily BickingGil, DO   0.5 mcg at 05/31/25 1002    carvedilol (COREG) tablet 25 mg  25 mg Oral BID With Meals BickingGil DO   25 mg at 05/28/25 1745    cloNIDine (CATAPRES) tablet 0.3 mg  0.3 mg Oral TID SarahkingGil DO   0.3 mg at 05/30/25 2031    clopidogrel (PLAVIX) tablet 75 mg  75 mg Oral Daily BickingGil DO   75 mg at 05/31/25 1001    cyclobenzaprine (FLEXERIL) tablet 5 mg  5 mg Oral TID PRN BicGil donald DO   5 mg at 05/31/25 1119    epoetin cecile-epbx (RETACRIT) 5,000 Units 2 mL injection  5,000 Units Subcutaneous Once per day on Monday Wednesday Friday Gil Pineda DO   5,000 Units at 05/26/25 0925    gabapentin (NEURONTIN) capsule 300 mg  300 mg Oral Nightly BickingGil DO   300 mg at 05/30/25 2031    heparin (porcine) injection 3,200 Units  3,200 Units Intracatheter PRN BicGil donald DO   3,200 Units at 05/30/25 1147    heparin (porcine) injection 3,600 Units  3,600 Units Intravenous Once BickingGil DO        hydrALAZINE (APRESOLINE) injection 10 mg  10 mg Intravenous Q6H PRN BickingGil, DO   10 mg at 05/22/25 0654    [Held by provider] hydrALAZINE (APRESOLINE) tablet 50 mg  50 mg Oral TID Garrett Comer MD        ipratropium-albuterol (DUO-NEB) nebulizer solution 3 mL  3 mL Nebulization TID PRN Gil Pineda DO        iron polysaccharides (NIFEREX) capsule 150 mg  150 mg Oral Daily  Gil Pineda DO   150 mg at 05/31/25 1001    isosorbide dinitrate (ISORDIL) tablet 10 mg  10 mg Oral TID - Nitrates Gil Pineda DO   10 mg at 05/31/25 1001    levothyroxine (SYNTHROID, LEVOTHROID) tablet 100 mcg  100 mcg Oral Q AM Gil Pineda DO   100 mcg at 05/31/25 0633    montelukast (SINGULAIR) tablet 10 mg  10 mg Oral Nightly Gil Pineda DO   10 mg at 05/30/25 2031    Morphine sulfate (PF) injection 1 mg  1 mg Intravenous Q4H PRN Garrett Comer MD   1 mg at 05/30/25 2141    NIFEdipine XL (PROCARDIA XL) 24 hr tablet 90 mg  90 mg Oral Daily Gil Pineda DO        nitroglycerin (NITROSTAT) SL tablet 0.4 mg  0.4 mg Sublingual Q5 Min PRN Gil Pineda DO        oxyCODONE-acetaminophen (PERCOCET) 5-325 MG per tablet 1 tablet  1 tablet Oral Q4H PRN Gil Pineda DO   1 tablet at 05/31/25 1001    pantoprazole (PROTONIX) EC tablet 40 mg  40 mg Oral Q AM Gil Pineda DO   40 mg at 05/31/25 0633    piperacillin-tazobactam (ZOSYN) 3.375 g IVPB in 100 mL NS MBP (CD)  3.375 g Intravenous Q12H Gil Pineda DO   3.375 g at 05/31/25 0633    prochlorperazine (COMPAZINE) injection 5 mg  5 mg Intravenous Q6H PRN Gil Pineda DO   5 mg at 05/28/25 1752    Or    prochlorperazine (COMPAZINE) tablet 5 mg  5 mg Oral Q6H PRN Gil Pineda DO        Or    prochlorperazine (COMPAZINE) suppository 25 mg  25 mg Rectal Q12H PRN Gil Pineda DO        promethazine (PHENERGAN) 12.5 mg in sodium chloride 0.9 % 50 mL  12.5 mg Intravenous Q6H PRN Gil Pineda DO        sodium chloride 0.9 % flush 10 mL  10 mL Intravenous PRN Bicking, Gil K, DO        sodium chloride 0.9 % flush 10 mL  10 mL Intravenous Q12H BickingGil, DO   10 mL at 05/31/25 1002    sodium chloride 0.9 % flush 10 mL  10 mL Intravenous PRN Bicking, Gil FUCHS, DO        sodium chloride 0.9 % infusion 40 mL  40 mL Intravenous PRN Bicking, Gil FUCHS, DO        spironolactone  (ALDACTONE) tablet 25 mg  25 mg Oral Daily Bicking, Gil K, DO   25 mg at 05/27/25 0759    tamsulosin (FLOMAX) 24 hr capsule 0.4 mg  0.4 mg Oral Nightly Bicking, Gil K, DO   0.4 mg at 05/30/25 2031    valsartan (DIOVAN) tablet 320 mg  320 mg Oral Daily Bicking, Gil K, DO   320 mg at 05/27/25 0756       Past Medical History:  Past Medical History:   Diagnosis Date    3-vessel CAD 08/11/2020    Allergic rhinitis     Anemia     Anxiety disorder 04/27/2020    Arthritis     Asymmetrical sensorineural hearing loss 06/28/2017    Atherosclerosis of native artery of both lower extremities with intermittent claudication 07/18/2019    Avascular necrosis of femoral head, left 07/11/2020    right hip after surgery    Carotid stenosis     Chronic mucoid otitis media     Chronic rhinitis     COPD (chronic obstructive pulmonary disease)     Coronary artery disease     HEART BYPASS 2004    Crohn's disease of large intestine with other complication 07/30/2020    Chronic diarrhea Colonoscopy July 2020 revealed mild patchy scattered hemosiderin staining with inflammation more so in rectosigmoid area.  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    Iron deficiency anemia 07/14/2020    Mixed hearing loss of left ear     PAD (peripheral artery disease) 08/11/2020    Perianal abscess     Pernicious anemia 08/17/2020    took shots but never diagnosed with b12 deficiency    Personal history of alcoholism 08/11/2020    quit drinking in 2013    Prostatic hypertrophy 08/11/2020    Sensorineural hearing loss     Sepsis with acute renal failure 09/15/2020    Shortness of breath  05/27/2021    Tinnitus     Ventricular tachycardia, nonsustained 07/14/2020    Weight loss 07/11/2020       Past Surgical History:  Past Surgical History:   Procedure Laterality Date    AORTOGRAM Right 4/25/2025    Procedure: RIGHT LOWER EXTREMITY ANGIOGRAM, SHOCKWAVE LITHOTRIPSY, BALLOON ANGIOPLASTY, MYNX CLOSURE;  Surgeon: Gil Pineda DO;  Location: [facility] HYBRID OR;  Service: Vascular;  Laterality: Right;    AORTOGRAM Left 5/22/2025    Procedure: LEFT LOWER EXTREMITY ANGIOGRAM, SHOCKWAVE LITHOTRIPSY, BALLOON ANGIOPLASTY, STENT PLACEMENT, MYNX CLOSURE;  Surgeon: Blayne Zabala MD;  Location: [facility] HYBRID OR;  Service: Vascular;  Laterality: Left;    ARTERY SURGERY  2021    right side on neck    CAROTID ENDARTERECTOMY Right 05/10/2021    Procedure: RIGHT CAROTID ENDARTERECTOMY WITH EEG;  Surgeon: Gil Pineda DO;  Location: [facility] HYBRID OR 12;  Service: Vascular;  Laterality: Right;    COLONOSCOPY N/A 07/02/2020    Procedure: COLONOSCOPY WITH ANESTHESIA;  Surgeon: Adrien Brewster MD;  Location: [facility] ENDOSCOPY;  Service: Gastroenterology;  Laterality: N/A;  pre op: diarrhea  post op: polyps  PCP: Joe Velasco MD    COLONOSCOPY N/A 10/13/2020    Procedure: COLONOSCOPY WITH ANESTHESIA;  Surgeon: Adrien Brewster MD;  Location: [facility] ENDOSCOPY;  Service: Gastroenterology;  Laterality: N/A;  Pre: Chronic Diarrhea, Crohn's  Post: AVM  Dr. Neftali Velasco  CO2 Inflation Used    COLONOSCOPY N/A 12/08/2023    Procedure: COLONOSCOPY WITH ANESTHESIA;  Surgeon: Adrein Brewster MD;  Location: [facility] ENDOSCOPY;  Service: Gastroenterology;  Laterality: N/A;  pre chrone's disease  post sub optimal prep, polyp, chrone's      CORONARY ARTERY BYPASS GRAFT  2003    x3    ENDOSCOPY N/A 11/02/2021    Procedure: ESOPHAGOGASTRODUODENOSCOPY WITH ANESTHESIA;  Surgeon: Bridger Bell MD;  Location: [facility] ENDOSCOPY;  Service: Gastroenterology;  Laterality: N/A;  pre anemia;gi bleed  post  gi  bleed;fabian ring  Dr. ERIC Velasco    ENDOSCOPY N/A 10/10/2023    Procedure: ESOPHAGOGASTRODUODENOSCOPY WITH ANESTHESIA;  Surgeon: Adrien Brewster MD;  Location: [facility] ENDOSCOPY;  Service: Gastroenterology;  Laterality: N/A;  preop; anemia  postop esophagitis ; R/O barretts   PCP Randall Beata    EYE SURGERY Bilateral     catorac    INCISION AND DRAINAGE PERIRECTAL ABSCESS N/A 2017    Procedure: INCISION AND DRAINAGE OF JEET ANAL ABSCESS;  Surgeon: Lynette Smith MD;  Location: [facility] OR;  Service:     INGUINAL HERNIA REPAIR Bilateral 2023    Procedure: INGUINAL HERNIA BILATERAL REPAIR LAPAROSCOPIC WITH DAVINCI ROBOT WITH MESH;  Surgeon: Tahira Rivera MD;  Location: [facility] OR;  Service: Robotics - DaVinci;  Laterality: Bilateral;    INSERTION HEMODIALYSIS CATHETER N/A 2025    Procedure: HEMODIALYSIS CATHETER PLACEMENT;  Surgeon: Gil Pineda DO;  Location: [facility] HYBRID OR;  Service: Vascular;  Laterality: N/A;    MYRINGOTOMY W/ TUBES Left 2017    06/10/2016    TONSILLECTOMY      TOTAL HIP ARTHROPLASTY Right        Family History  Family History   Problem Relation Age of Onset    Breast cancer Mother     Dementia Father     Glaucoma Father     No Known Problems Daughter     Colon polyps Neg Hx     Colon cancer Neg Hx        Social History  Social History     Socioeconomic History    Marital status:    Tobacco Use    Smoking status: Former     Current packs/day: 0.00     Average packs/day: 0.5 packs/day for 25.8 years (12.9 ttl pk-yrs)     Types: Cigarettes     Start date:      Quit date: 10/13/2013     Years since quittin.6     Passive exposure: Past    Smokeless tobacco: Never    Tobacco comments:     quit 2013   Vaping Use    Vaping status: Former    Substances: Nicotine    Devices: Pre-filled or refillable cartridge   Substance and Sexual Activity    Alcohol use: Not Currently    Drug use: No    Sexual activity: Not Currently     Partners: Female        Review of Systems:  History obtained from chart review and the patient  General ROS: No fever or chills  Respiratory ROS: No cough, shortness of breath, wheezing  Cardiovascular ROS: No chest pain or palpitations  Gastrointestinal ROS: No abdominal pain or melena  Genito-Urinary ROS: No dysuria or hematuria  Psych ROS: No anxiety and depression  14 point ROS reviewed with the patient and negative except as noted above and in the HPI unless unable to obtain.    Objective:  Patient Vitals for the past 24 hrs:   BP Temp Temp src Pulse Resp SpO2 Weight   05/31/25 1110 110/48 98.8 °F (37.1 °C) Oral 58 16 91 % --   05/31/25 0749 142/49 98.2 °F (36.8 °C) Oral 69 16 92 % --   05/31/25 0302 (!) 118/38 99.1 °F (37.3 °C) Oral 62 16 93 % 61.1 kg (134 lb 11.2 oz)   05/30/25 2259 (!) 106/38 98.5 °F (36.9 °C) Oral 64 16 95 % --   05/30/25 1846 131/40 97.5 °F (36.4 °C) Axillary 77 14 92 % --   05/30/25 1749 128/41 97.9 °F (36.6 °C) Axillary 66 14 94 % --   05/30/25 1717 (!) 117/38 98.1 °F (36.7 °C) Axillary 63 14 94 % --   05/30/25 1700 109/40 98.1 °F (36.7 °C) -- 67 15 93 % --   05/30/25 1655 112/41 -- -- 61 14 96 % --   05/30/25 1640 (!) 115/38 -- -- 67 17 100 % --   05/30/25 1635 119/40 -- -- 66 17 100 % --   05/30/25 1630 124/46 -- -- 90 17 100 % --   05/30/25 1625 126/47 -- -- 69 17 100 % --   05/30/25 1621 133/47 98.1 °F (36.7 °C) Temporal 68 15 100 % --   05/30/25 1409 -- -- -- 69 -- 96 % --   05/30/25 1324 129/43 99 °F (37.2 °C) Oral 72 14 96 % --       Intake/Output Summary (Last 24 hours) at 5/31/2025 1256  Last data filed at 5/30/2025 2259  Gross per 24 hour   Intake 0 ml   Output 300 ml   Net -300 ml     General: awake/alert   Chest:  clear to auscultation bilaterally without respiratory distress  CVS: regular rate and rhythm  Abdominal: soft, nontender, positive bowel sounds  Extremities: no cyanosis or edema  Skin: warm and dry without rash      Labs:  Results from last 7 days   Lab Units 05/31/25  9322  05/30/25  1426 05/30/25  0341   WBC 10*3/mm3 7.12 7.53 9.47   HEMOGLOBIN g/dL 7.4* 7.5* 7.2*   HEMATOCRIT % 22.3* 23.0* 22.2*   PLATELETS 10*3/mm3 112* 114* 139*         Results from last 7 days   Lab Units 05/31/25  0431 05/30/25  1443 05/30/25  1426 05/30/25  0341 05/26/25  0309 05/25/25  0457   SODIUM mmol/L 137  --  138 134*   < > 139   SODIUM, ARTERIAL mmol/L  --  137  --   --   --   --    POTASSIUM mmol/L 4.1  --  3.8 4.2   < > 3.3*   CHLORIDE mmol/L 102  --  101 95*   < > 96*   CO2 mmol/L 23.0  --  26.0 25.0   < > 26.0   BUN mg/dL 27.3*  --  19.5 52.5*   < > 43*   CREATININE mg/dL 2.69*  --  1.86* 4.22*   < > 3.75*   CALCIUM mg/dL 7.9*  --  8.4* 8.4*   < > 9.3   EGFR mL/min/1.73 23.6*  --  36.8* 13.8*   < > 15.9*   BILIRUBIN mg/dL  --   --   --   --   --  0.4   ALK PHOS U/L  --   --   --   --   --  102   ALT (SGPT) U/L  --   --   --   --   --  <5   AST (SGOT) U/L  --   --   --   --   --  20   GLUCOSE mg/dL 97  --  102* 98   < > 138*    < > = values in this interval not displayed.       Radiology:   Imaging Results (Last 72 Hours)       Procedure Component Value Units Date/Time    FL C Arm During Surgery [[id number]] Resulted: 05/30/25 1615     Updated: 05/30/25 1615    Narrative:      This procedure was auto-finalized with no dictation required.    IR Angiogram Extremity - In process [[id number]] Resulted: 05/30/25 1413     Updated: 05/30/25 1615    This result has not been signed. Information might be incomplete.      XR Chest 1 View [[id number]] Collected: 05/29/25 1529     Updated: 05/29/25 1533    Narrative:      EXAMINATION: XR CHEST 1 VW-  5/29/2025 3:29 PM 1 view     HISTORY: Shortness of breath; I70.90-Unspecified atherosclerosis;  Z99.2-Dependence on renal dialysis     COMPARISON: 4/20/2025     TECHNIQUE: A single frontal view of the chest was obtained.     FINDINGS:  Large bore central venous catheter on the RIGHT is redemonstrated.  Median sternotomy wires are again seen. Mild cardiomegaly and  "calcified  atherosclerotic vascular disease. Small pleural effusion on the RIGHT  with dense consolidation in the RIGHT midlung, new since 4/20/2025. This  may represent pneumonia but is nonspecific.       Impression:         1.  New airspace consolidation in the RIGHT midlung, concerning for  pneumonia.     2.  There is a small pleural effusion at the RIGHT lung base as well.     3.  Chronic changes as discussed above.           This report was signed and finalized on 5/29/2025 3:30 PM by Dr. Dmitri Aguilar MD.               Culture:  No results found for: \"BLOODCX\", \"URINECX\", \"WOUNDCX\", \"MRSACX\", \"RESPCX\", \"STOOLCX\"      Assessment    End stage renal disease on HD MWF  Hypertension  Anemia of CKD  Peripheral vascular disease--s/p angiogram on 5/22 and multiple balloon angioplasties in the left lower extremity on 5/30  Thrombocytopenia  Hypokalemia--improved  Hyponatremia     Plan:   Dialysis is due next on June 2  Follow-up with vascular      Bolivargus Rueda MD  5/31/2025  12:56 CDT    "

## 2025-06-01 LAB
ANION GAP SERPL CALCULATED.3IONS-SCNC: 16 MMOL/L (ref 5–15)
BUN SERPL-MCNC: 41.9 MG/DL (ref 8–23)
BUN/CREAT SERPL: 11.4 (ref 7–25)
CALCIUM SPEC-SCNC: 8.5 MG/DL (ref 8.6–10.5)
CHLORIDE SERPL-SCNC: 101 MMOL/L (ref 98–107)
CO2 SERPL-SCNC: 20 MMOL/L (ref 22–29)
CREAT SERPL-MCNC: 3.68 MG/DL (ref 0.76–1.27)
DEPRECATED RDW RBC AUTO: 65.4 FL (ref 37–54)
EGFRCR SERPLBLD CKD-EPI 2021: 16.2 ML/MIN/1.73
ERYTHROCYTE [DISTWIDTH] IN BLOOD BY AUTOMATED COUNT: 18.3 % (ref 12.3–15.4)
GLUCOSE SERPL-MCNC: 98 MG/DL (ref 65–99)
HCT VFR BLD AUTO: 24.2 % (ref 37.5–51)
HGB BLD-MCNC: 7.6 G/DL (ref 13–17.7)
MCH RBC QN AUTO: 31.1 PG (ref 26.6–33)
MCHC RBC AUTO-ENTMCNC: 31.4 G/DL (ref 31.5–35.7)
MCV RBC AUTO: 99.2 FL (ref 79–97)
PLATELET # BLD AUTO: 120 10*3/MM3 (ref 140–450)
PMV BLD AUTO: 10 FL (ref 6–12)
POTASSIUM SERPL-SCNC: 4.5 MMOL/L (ref 3.5–5.2)
RBC # BLD AUTO: 2.44 10*6/MM3 (ref 4.14–5.8)
SODIUM SERPL-SCNC: 137 MMOL/L (ref 136–145)
WBC NRBC COR # BLD AUTO: 9.21 10*3/MM3 (ref 3.4–10.8)

## 2025-06-01 PROCEDURE — 80048 BASIC METABOLIC PNL TOTAL CA: CPT | Performed by: SURGERY

## 2025-06-01 PROCEDURE — 85027 COMPLETE CBC AUTOMATED: CPT | Performed by: SURGERY

## 2025-06-01 PROCEDURE — 25010000002 PIPERACILLIN SOD-TAZOBACTAM PER 1 G: Performed by: SURGERY

## 2025-06-01 PROCEDURE — 25010000002 AMPICILLIN-SULBACTAM PER 1.5 G: Performed by: INTERNAL MEDICINE

## 2025-06-01 RX ADMIN — Medication 10 ML: at 21:15

## 2025-06-01 RX ADMIN — ISOSORBIDE DINITRATE 10 MG: 10 TABLET ORAL at 15:34

## 2025-06-01 RX ADMIN — VALSARTAN 320 MG: 80 TABLET, FILM COATED ORAL at 11:11

## 2025-06-01 RX ADMIN — Medication 150 MG: at 11:14

## 2025-06-01 RX ADMIN — SPIRONOLACTONE 25 MG: 25 TABLET ORAL at 11:09

## 2025-06-01 RX ADMIN — LEVOTHYROXINE SODIUM 100 MCG: 0.1 TABLET ORAL at 04:59

## 2025-06-01 RX ADMIN — CARVEDILOL 25 MG: 25 TABLET, FILM COATED ORAL at 18:14

## 2025-06-01 RX ADMIN — ALPRAZOLAM 0.25 MG: 0.25 TABLET ORAL at 21:15

## 2025-06-01 RX ADMIN — PANTOPRAZOLE SODIUM 40 MG: 40 TABLET, DELAYED RELEASE ORAL at 04:59

## 2025-06-01 RX ADMIN — TAMSULOSIN HYDROCHLORIDE 0.4 MG: 0.4 CAPSULE ORAL at 21:15

## 2025-06-01 RX ADMIN — OXYCODONE HYDROCHLORIDE AND ACETAMINOPHEN 1 TABLET: 5; 325 TABLET ORAL at 10:41

## 2025-06-01 RX ADMIN — ISOSORBIDE DINITRATE 10 MG: 10 TABLET ORAL at 11:14

## 2025-06-01 RX ADMIN — GABAPENTIN 300 MG: 300 CAPSULE ORAL at 21:15

## 2025-06-01 RX ADMIN — NIFEDIPINE 90 MG: 60 TABLET, FILM COATED, EXTENDED RELEASE ORAL at 11:13

## 2025-06-01 RX ADMIN — PIPERACILLIN AND TAZOBACTAM 3.38 G: 3; .375 INJECTION, POWDER, FOR SOLUTION INTRAVENOUS at 05:00

## 2025-06-01 RX ADMIN — OXYCODONE HYDROCHLORIDE AND ACETAMINOPHEN 1 TABLET: 5; 325 TABLET ORAL at 18:14

## 2025-06-01 RX ADMIN — CLONIDINE HYDROCHLORIDE 0.3 MG: 0.1 TABLET ORAL at 11:15

## 2025-06-01 RX ADMIN — CYCLOBENZAPRINE 5 MG: 10 TABLET, FILM COATED ORAL at 21:15

## 2025-06-01 RX ADMIN — CLONIDINE HYDROCHLORIDE 0.3 MG: 0.1 TABLET ORAL at 16:40

## 2025-06-01 RX ADMIN — MONTELUKAST SODIUM 10 MG: 10 TABLET, COATED ORAL at 21:15

## 2025-06-01 RX ADMIN — AMPICILLIN SODIUM, SULBACTAM SODIUM 3 G: 2; 1 INJECTION, POWDER, FOR SOLUTION INTRAMUSCULAR; INTRAVENOUS at 15:35

## 2025-06-01 RX ADMIN — CARVEDILOL 25 MG: 25 TABLET, FILM COATED ORAL at 11:30

## 2025-06-01 RX ADMIN — CLOPIDOGREL BISULFATE 75 MG: 75 TABLET, FILM COATED ORAL at 11:13

## 2025-06-01 RX ADMIN — Medication 10 ML: at 11:32

## 2025-06-01 RX ADMIN — ASPIRIN 81 MG: 81 TABLET, COATED ORAL at 11:12

## 2025-06-01 RX ADMIN — ATORVASTATIN CALCIUM 10 MG: 10 TABLET, FILM COATED ORAL at 11:14

## 2025-06-01 RX ADMIN — ISOSORBIDE DINITRATE 10 MG: 10 TABLET ORAL at 18:13

## 2025-06-01 RX ADMIN — CALCITRIOL CAPSULES 0.25 MCG 0.5 MCG: 0.25 CAPSULE ORAL at 11:11

## 2025-06-01 NOTE — CASE MANAGEMENT/SOCIAL WORK
Continued Stay Note  [facility]     Patient Name: Ricardo Hugo  MRN: [id number]  Today's Date: 6/1/2025    Admit Date: 5/21/2025    Plan: Referral to [facility]   Discharge Plan       Row Name 06/01/25 1459       Plan    Plan Referral to [facility]    Patient/Family in Agreement with Plan yes    Provided Post Acute Provider List? Yes    Post Acute Provider List Nursing Home    Provided Post Acute Provider Quality & Resource List? Yes    Post Acute Provider Quality and Resource List Nursing Home    Plan Comments Pt is requesting a referral to [facility] for rehab. Referral sent.                   Discharge Codes    No documentation.                       LINNETTE Ramos

## 2025-06-01 NOTE — PROGRESS NOTES
Nephrology ([facility]) Progress Note      Patient:  Ricardo Hugo  YOB: 1950  Date of Service: 6/1/2025  MRN: [id number]   Acct: [id number]   Primary Care Physician: Nathan Zimmer MD  Advance Directive:   Code Status and Medical Interventions: CPR (Attempt to Resuscitate); Full Support   Ordered at: 05/21/25 1822     Code Status (Patient has no pulse and is not breathing):    CPR (Attempt to Resuscitate)     Medical Interventions (Patient has pulse or is breathing):    Full Support     Admit Date: 5/21/2025       Hospital Day: 11  Referring Provider: No ref. provider found      Patient personally seen and examined.  Complete chart including Consults, Notes, Operative Reports, Labs, Cardiology, and Radiology studies reviewed as able.        Subjective:  Ricardo Hugo is a 75 y.o. male for whom we were consulted for evaluation and treatment of end stage renal disease on hemodialysis Monday Wednesday Friday.  Presented with left lower leg pain. Recently had right lower extremity angiogram which has significantly improved the pain he was having in his right leg. Dr Zabala took patient to OR on 5/22 for angiogram and stenting of left external iliac artery.  Has been tolerating inpatient HD well since arrival. Unfortunately pain in left leg has persisted and a left AKA has been considered. Tolerated HD well on 5/28. He is status post angiogram with balloon angioplasty of the left proximal SFA and below-knee popliteal artery as well as the proximal anterior tibial artery and entire peroneal tibial artery and tibioperoneal trunk on May 30th.  Has also received dialysis on 5/31.  He was complaining of persistent left foot pain but a bit better.       Allergies:  Ondansetron, Zofran [ondansetron hcl], Lortab [hydrocodone-acetaminophen], and Allopurinol    Home Meds:  Medications Prior to Admission   Medication Sig Dispense Refill Last Dose/Taking    ALPRAZolam (XANAX) 0.25  MG tablet Take 1 tablet by mouth Every Night.   Taking    aspirin (aspirin) 81 MG EC tablet Take 1 tablet by mouth Daily.   Taking    atorvastatin (LIPITOR) 10 MG tablet Take 1 tablet by mouth Daily. 90 tablet 3 Taking    calcitriol (ROCALTROL) 0.5 MCG capsule Take 1 capsule by mouth Daily. 90 capsule 2 Taking    carvedilol (COREG) 25 MG tablet Take 1 tablet by mouth 2 (Two) Times a Day With Meals.   Taking    cholestyramine light 4 g packet Take 1 packet by mouth Daily. 90 packet 1 Taking    cloNIDine (CATAPRES) 0.3 MG tablet Take 1 tablet by mouth 3 times a day.   Taking    clopidogrel (PLAVIX) 75 MG tablet Take 1 tablet by mouth Daily.   Taking    Copper Gluconate (Copper Caps) 2 MG capsule Take 2 mg by mouth Daily.   Taking    empagliflozin (Jardiance) 10 MG tablet tablet Take 1 tablet by mouth Daily.   Taking    fexofenadine (ALLEGRA) 180 MG tablet Take 1 tablet by mouth Daily.   Taking    furosemide (LASIX) 40 MG tablet Take 1 tablet by mouth Daily. 90 tablet 2 Taking    hydrALAZINE (APRESOLINE) 100 MG tablet Take 1 tablet by mouth 3 (Three) Times a Day. 100mg daily   Taking    isosorbide dinitrate (ISORDIL) 10 MG tablet Take 1 tablet by mouth 3 (Three) Times a Day. 270 tablet 2 Taking    levothyroxine (Synthroid) 100 MCG tablet Take 1 tablet by mouth Every Morning. 90 tablet 2 Taking    magnesium chloride ER 64 MG DR tablet Take 1 tablet by mouth Daily.   Taking    Melatonin 10 MG tablet Take 1 tablet by mouth Every Night.   Taking    mesalamine (APRISO) 0.375 g 24 hr capsule Take 4 capsules by mouth Daily. 360 capsule 1 Taking    Multiple Vitamins-Minerals (PRESERVISION/LUTEIN) capsule Take 1 capsule by mouth 2 (two) times a day.   Taking    NIFEdipine XL (PROCARDIA XL) 60 MG 24 hr tablet Take 1 tablet by mouth Daily.   Taking    omeprazole (priLOSEC) 20 MG capsule Take 1 capsule by mouth Daily.   Taking    sodium bicarbonate 650 MG tablet Take 1 tablet by mouth 5 (Five) Times a Day.   Taking     spironolactone (ALDACTONE) 25 MG tablet Take 1 tablet by mouth Daily.   Taking    tamsulosin (FLOMAX) 0.4 MG capsule 24 hr capsule Take 1 capsule by mouth Every Night.   Taking    valsartan (DIOVAN) 320 MG tablet Take 1 tablet by mouth Daily.   Taking    vitamin D (ERGOCALCIFEROL) 1.25 MG (36509 UT) capsule capsule Take 1 capsule by mouth 1 (One) Time Per Week. Mondays   Taking    albuterol sulfate  (90 Base) MCG/ACT inhaler Inhale 2 puffs Every 6 (Six) Hours As Needed for Wheezing or Shortness of Air.       aspirin-acetaminophen-caffeine (EXCEDRIN MIGRAINE) 250-250-65 MG per tablet Take 1 tablet by mouth Every 6 (Six) Hours As Needed for Headache. (Patient taking differently: Take 3 tablets by mouth Every 6 (Six) Hours As Needed for Headache. Patient reports he takes 6-12 every day.)       Budeson-Glycopyrrol-Formoterol (Breztri Aerosphere) 160-9-4.8 MCG/ACT aerosol inhaler Inhale 2 puffs 2 (Two) Times a Day.       desoximetasone (TOPICORT) 0.25 % cream Apply 1 Application topically to the appropriate area as directed 2 (Two) Times a Day As Needed for Irritation. irritation       diphenhydrAMINE HCl, Sleep, 50 MG/30ML liquid Take 15 mL by mouth At Night As Needed (insomnia).       docusate sodium (COLACE) 100 MG capsule Take 1 capsule by mouth 3 (Three) Times a Day As Needed for Constipation.       fluticasone (FLONASE) 50 MCG/ACT nasal spray Administer 2 sprays into the nostril(s) as directed by provider Daily As Needed for Allergies.       montelukast (SINGULAIR) 10 MG tablet TAKE 1 TABLET EVERY NIGHT 90 tablet 3        Medicines:  Current Facility-Administered Medications   Medication Dose Route Frequency Provider Last Rate Last Admin    ALPRAZolam (XANAX) tablet 0.25 mg  0.25 mg Oral Nightly PRN BickingGil DO   0.25 mg at 05/31/25 2021    aspirin EC tablet 81 mg  81 mg Oral Daily BickingGil DO   81 mg at 06/01/25 1112    atorvastatin (LIPITOR) tablet 10 mg  10 mg Oral Daily Bicking,  Gil FUCHS DO   10 mg at 06/01/25 1114    sennosides-docusate (PERICOLACE) 8.6-50 MG per tablet 2 tablet  2 tablet Oral BID PRN Gil Pineda DO   2 tablet at 05/31/25 2011    And    polyethylene glycol (MIRALAX) packet 17 g  17 g Oral Daily PRN SarahkingGil DO        And    bisacodyl (DULCOLAX) EC tablet 5 mg  5 mg Oral Daily PRN BickingGil DO   5 mg at 05/28/25 0634    And    bisacodyl (DULCOLAX) suppository 10 mg  10 mg Rectal Daily PRN Gil Pineda DO        calcitriol (ROCALTROL) capsule 0.5 mcg  0.5 mcg Oral Daily BickingGil DO   0.5 mcg at 06/01/25 1111    carvedilol (COREG) tablet 25 mg  25 mg Oral BID With Meals Gil Pineda DO   25 mg at 06/01/25 1130    cloNIDine (CATAPRES) tablet 0.3 mg  0.3 mg Oral TID Gil Pineda DO   0.3 mg at 06/01/25 1115    clopidogrel (PLAVIX) tablet 75 mg  75 mg Oral Daily BicGil donald DO   75 mg at 06/01/25 1113    cyclobenzaprine (FLEXERIL) tablet 5 mg  5 mg Oral TID PRN BicGil donald DO   5 mg at 05/31/25 1119    epoetin cecile-epbx (RETACRIT) 5,000 Units 2 mL injection  5,000 Units Subcutaneous Once per day on Monday Wednesday Friday Gil Pineda DO   5,000 Units at 05/26/25 0925    gabapentin (NEURONTIN) capsule 300 mg  300 mg Oral Nightly BicGil donald DO   300 mg at 05/31/25 2021    heparin (porcine) injection 3,200 Units  3,200 Units Intracatheter PRN BicGil donald DO   3,200 Units at 05/30/25 1147    heparin (porcine) injection 3,600 Units  3,600 Units Intravenous Once BicGil donald DO        hydrALAZINE (APRESOLINE) injection 10 mg  10 mg Intravenous Q6H PRN BicGil donald DO   10 mg at 05/22/25 0654    [Held by provider] hydrALAZINE (APRESOLINE) tablet 50 mg  50 mg Oral TID Garrett Comer MD        ipratropium-albuterol (DUO-NEB) nebulizer solution 3 mL  3 mL Nebulization TID PRN Gil Pineda DO        iron polysaccharides (NIFEREX) capsule 150 mg  150 mg Oral Daily  Gil Pineda DO   150 mg at 06/01/25 1114    isosorbide dinitrate (ISORDIL) tablet 10 mg  10 mg Oral TID - Nitrates Gil Pineda DO   10 mg at 06/01/25 1114    levothyroxine (SYNTHROID, LEVOTHROID) tablet 100 mcg  100 mcg Oral Q AM Gil Pineda DO   100 mcg at 06/01/25 0459    montelukast (SINGULAIR) tablet 10 mg  10 mg Oral Nightly Gil Pineda DO   10 mg at 05/31/25 2022    Morphine sulfate (PF) injection 1 mg  1 mg Intravenous Q4H PRN Garrett Comer MD   1 mg at 05/30/25 2141    NIFEdipine XL (PROCARDIA XL) 24 hr tablet 90 mg  90 mg Oral Daily Gil Pineda DO   90 mg at 06/01/25 1113    nitroglycerin (NITROSTAT) SL tablet 0.4 mg  0.4 mg Sublingual Q5 Min PRN Gil Pineda DO        oxyCODONE-acetaminophen (PERCOCET) 5-325 MG per tablet 1 tablet  1 tablet Oral Q4H PRN Gil Pineda DO   1 tablet at 06/01/25 1041    pantoprazole (PROTONIX) EC tablet 40 mg  40 mg Oral Q AM Gil Pineda DO   40 mg at 06/01/25 0459    piperacillin-tazobactam (ZOSYN) 3.375 g IVPB in 100 mL NS MBP (CD)  3.375 g Intravenous Q12H Gil Pineda DO   3.375 g at 06/01/25 0500    prochlorperazine (COMPAZINE) injection 5 mg  5 mg Intravenous Q6H PRN Gil Pineda DO   5 mg at 05/28/25 1752    Or    prochlorperazine (COMPAZINE) tablet 5 mg  5 mg Oral Q6H PRN Gil Pineda DO        Or    prochlorperazine (COMPAZINE) suppository 25 mg  25 mg Rectal Q12H PRN Gil Pineda DO        promethazine (PHENERGAN) 12.5 mg in sodium chloride 0.9 % 50 mL  12.5 mg Intravenous Q6H PRN Bicking, Gil K, DO        sodium chloride 0.9 % flush 10 mL  10 mL Intravenous PRN BickingGil, DO        sodium chloride 0.9 % flush 10 mL  10 mL Intravenous Q12H BickingGil, DO   10 mL at 06/01/25 1132    sodium chloride 0.9 % flush 10 mL  10 mL Intravenous PRN Bicking, Gil FUCHS, DO        sodium chloride 0.9 % infusion 40 mL  40 mL Intravenous PRN BickingGil, DO         spironolactone (ALDACTONE) tablet 25 mg  25 mg Oral Daily Bicking, Gil K, DO   25 mg at 06/01/25 1109    tamsulosin (FLOMAX) 24 hr capsule 0.4 mg  0.4 mg Oral Nightly Bicking, Gil K, DO   0.4 mg at 05/31/25 2019    valsartan (DIOVAN) tablet 320 mg  320 mg Oral Daily Bicking, Gil K, DO   320 mg at 06/01/25 1111       Past Medical History:  Past Medical History:   Diagnosis Date    3-vessel CAD 08/11/2020    Allergic rhinitis     Anemia     Anxiety disorder 04/27/2020    Arthritis     Asymmetrical sensorineural hearing loss 06/28/2017    Atherosclerosis of native artery of both lower extremities with intermittent claudication 07/18/2019    Avascular necrosis of femoral head, left 07/11/2020    right hip after surgery    Carotid stenosis     Chronic mucoid otitis media     Chronic rhinitis     COPD (chronic obstructive pulmonary disease)     Coronary artery disease     HEART BYPASS 2004    Crohn's disease of large intestine with other complication 07/30/2020    Chronic diarrhea Colonoscopy July 2020 revealed mild patchy scattered hemosiderin staining with inflammation more so in rectosigmoid area.  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    Iron deficiency anemia 07/14/2020    Mixed hearing loss of left ear     PAD (peripheral artery disease) 08/11/2020    Perianal abscess     Pernicious anemia 08/17/2020    took shots but never diagnosed with b12 deficiency    Personal history of alcoholism 08/11/2020    quit drinking in 2013    Prostatic hypertrophy 08/11/2020    Sensorineural hearing loss     Sepsis with acute renal failure 09/15/2020    Shortness  of breath 05/27/2021    Tinnitus     Ventricular tachycardia, nonsustained 07/14/2020    Weight loss 07/11/2020       Past Surgical History:  Past Surgical History:   Procedure Laterality Date    AORTOGRAM Right 4/25/2025    Procedure: RIGHT LOWER EXTREMITY ANGIOGRAM, SHOCKWAVE LITHOTRIPSY, BALLOON ANGIOPLASTY, MYNX CLOSURE;  Surgeon: Gil Pineda DO;  Location: [facility] HYBRID OR;  Service: Vascular;  Laterality: Right;    AORTOGRAM Left 5/22/2025    Procedure: LEFT LOWER EXTREMITY ANGIOGRAM, SHOCKWAVE LITHOTRIPSY, BALLOON ANGIOPLASTY, STENT PLACEMENT, MYNX CLOSURE;  Surgeon: Blayne Zabala MD;  Location: [facility] HYBRID OR;  Service: Vascular;  Laterality: Left;    ARTERY SURGERY  2021    right side on neck    CAROTID ENDARTERECTOMY Right 05/10/2021    Procedure: RIGHT CAROTID ENDARTERECTOMY WITH EEG;  Surgeon: Gil Pineda DO;  Location: [facility] HYBRID OR 12;  Service: Vascular;  Laterality: Right;    COLONOSCOPY N/A 07/02/2020    Procedure: COLONOSCOPY WITH ANESTHESIA;  Surgeon: Adrien Brewster MD;  Location: [facility] ENDOSCOPY;  Service: Gastroenterology;  Laterality: N/A;  pre op: diarrhea  post op: polyps  PCP: Joe Velasco MD    COLONOSCOPY N/A 10/13/2020    Procedure: COLONOSCOPY WITH ANESTHESIA;  Surgeon: Adrien Brewster MD;  Location: [facility] ENDOSCOPY;  Service: Gastroenterology;  Laterality: N/A;  Pre: Chronic Diarrhea, Crohn's  Post: AVM  Dr. Neftali Velasco  CO2 Inflation Used    COLONOSCOPY N/A 12/08/2023    Procedure: COLONOSCOPY WITH ANESTHESIA;  Surgeon: Adrien Brewster MD;  Location: [facility] ENDOSCOPY;  Service: Gastroenterology;  Laterality: N/A;  pre chrone's disease  post sub optimal prep, polyp, chrone's      CORONARY ARTERY BYPASS GRAFT  2003    x3    ENDOSCOPY N/A 11/02/2021    Procedure: ESOPHAGOGASTRODUODENOSCOPY WITH ANESTHESIA;  Surgeon: Bridger Bell MD;  Location: [facility] ENDOSCOPY;  Service: Gastroenterology;  Laterality: N/A;  pre anemia;gi bleed  post   gi bleed;debbieki ring  Dr. ERIC Velasco    ENDOSCOPY N/A 10/10/2023    Procedure: ESOPHAGOGASTRODUODENOSCOPY WITH ANESTHESIA;  Surgeon: Adrien Brewster MD;  Location: [facility] ENDOSCOPY;  Service: Gastroenterology;  Laterality: N/A;  preop; anemia  postop esophagitis ; R/O barretts   PCP Randall Beata    EYE SURGERY Bilateral     catorac    INCISION AND DRAINAGE PERIRECTAL ABSCESS N/A 2017    Procedure: INCISION AND DRAINAGE OF JEET ANAL ABSCESS;  Surgeon: Lynette Smith MD;  Location: [facility] OR;  Service:     INGUINAL HERNIA REPAIR Bilateral 2023    Procedure: INGUINAL HERNIA BILATERAL REPAIR LAPAROSCOPIC WITH DAVINCI ROBOT WITH MESH;  Surgeon: Tahira Rivera MD;  Location: [facility] OR;  Service: Robotics - DaVinci;  Laterality: Bilateral;    INSERTION HEMODIALYSIS CATHETER N/A 2025    Procedure: HEMODIALYSIS CATHETER PLACEMENT;  Surgeon: Gil Pineda DO;  Location: [facility] HYBRID OR;  Service: Vascular;  Laterality: N/A;    MYRINGOTOMY W/ TUBES Left 2017    06/10/2016    TONSILLECTOMY      TOTAL HIP ARTHROPLASTY Right        Family History  Family History   Problem Relation Age of Onset    Breast cancer Mother     Dementia Father     Glaucoma Father     No Known Problems Daughter     Colon polyps Neg Hx     Colon cancer Neg Hx        Social History  Social History     Socioeconomic History    Marital status:    Tobacco Use    Smoking status: Former     Current packs/day: 0.00     Average packs/day: 0.5 packs/day for 25.8 years (12.9 ttl pk-yrs)     Types: Cigarettes     Start date:      Quit date: 10/13/2013     Years since quittin.6     Passive exposure: Past    Smokeless tobacco: Never    Tobacco comments:     quit 2013   Vaping Use    Vaping status: Former    Substances: Nicotine    Devices: Pre-filled or refillable cartridge   Substance and Sexual Activity    Alcohol use: Not Currently    Drug use: No    Sexual activity: Not Currently     Partners: Female        Review of Systems:  History obtained from chart review and the patient  General ROS: No fever or chills  Respiratory ROS: No cough, shortness of breath, wheezing  Cardiovascular ROS: No chest pain or palpitations  Gastrointestinal ROS: No abdominal pain or melena  Genito-Urinary ROS: No dysuria or hematuria  Psych ROS: No anxiety and depression  14 point ROS reviewed with the patient and negative except as noted above and in the HPI unless unable to obtain.    Objective:  Patient Vitals for the past 24 hrs:   BP Temp Temp src Pulse Resp SpO2 Weight   06/01/25 1158 130/50 98.8 °F (37.1 °C) Oral 81 16 93 % --   06/01/25 0805 166/69 98.9 °F (37.2 °C) Oral 96 14 93 % --   05/31/25 2325 159/51 98.6 °F (37 °C) Oral 74 16 91 % 61.6 kg (135 lb 14.4 oz)   05/31/25 2019 155/54 -- -- 76 -- -- --   05/31/25 2013 155/54 98.9 °F (37.2 °C) Oral 76 16 92 % --   05/31/25 1527 146/59 99.4 °F (37.4 °C) Oral 68 16 98 % --       Intake/Output Summary (Last 24 hours) at 6/1/2025 1240  Last data filed at 6/1/2025 1100  Gross per 24 hour   Intake 500 ml   Output 300 ml   Net 200 ml     General: awake/alert   Chest:  clear to auscultation bilaterally without respiratory distress  CVS: regular rate and rhythm  Abdominal: soft, nontender, positive bowel sounds  Extremities: no cyanosis or edema  Skin: warm and dry without rash      Labs:  Results from last 7 days   Lab Units 06/01/25  0304 05/31/25  0431 05/30/25  1426   WBC 10*3/mm3 9.21 7.12 7.53   HEMOGLOBIN g/dL 7.6* 7.4* 7.5*   HEMATOCRIT % 24.2* 22.3* 23.0*   PLATELETS 10*3/mm3 120* 112* 114*         Results from last 7 days   Lab Units 06/01/25  0304 05/31/25  0431 05/30/25  1443 05/30/25  1426   SODIUM mmol/L 137 137  --  138   SODIUM, ARTERIAL mmol/L  --   --  137  --    POTASSIUM mmol/L 4.5 4.1  --  3.8   CHLORIDE mmol/L 101 102  --  101   CO2 mmol/L 20.0* 23.0  --  26.0   BUN mg/dL 41.9* 27.3*  --  19.5   CREATININE mg/dL 3.68* 2.69*  --  1.86*   CALCIUM mg/dL 8.5* 7.9*  --   "8.4*   EGFR mL/min/1.73 16.2* 23.6*  --  36.8*   GLUCOSE mg/dL 98 97  --  102*       Radiology:   Imaging Results (Last 72 Hours)       Procedure Component Value Units Date/Time    FL C Arm During Surgery [[id number]] Resulted: 05/30/25 1615     Updated: 05/30/25 1615    Narrative:      This procedure was auto-finalized with no dictation required.    IR Angiogram Extremity - In process [[id number]] Resulted: 05/30/25 1413     Updated: 05/30/25 1615    This result has not been signed. Information might be incomplete.      XR Chest 1 View [[id number]] Collected: 05/29/25 1529     Updated: 05/29/25 1533    Narrative:      EXAMINATION: XR CHEST 1 VW-  5/29/2025 3:29 PM 1 view     HISTORY: Shortness of breath; I70.90-Unspecified atherosclerosis;  Z99.2-Dependence on renal dialysis     COMPARISON: 4/20/2025     TECHNIQUE: A single frontal view of the chest was obtained.     FINDINGS:  Large bore central venous catheter on the RIGHT is redemonstrated.  Median sternotomy wires are again seen. Mild cardiomegaly and calcified  atherosclerotic vascular disease. Small pleural effusion on the RIGHT  with dense consolidation in the RIGHT midlung, new since 4/20/2025. This  may represent pneumonia but is nonspecific.       Impression:         1.  New airspace consolidation in the RIGHT midlung, concerning for  pneumonia.     2.  There is a small pleural effusion at the RIGHT lung base as well.     3.  Chronic changes as discussed above.           This report was signed and finalized on 5/29/2025 3:30 PM by Dr. Dmitri Aguilar MD.               Culture:  No results found for: \"BLOODCX\", \"URINECX\", \"WOUNDCX\", \"MRSACX\", \"RESPCX\", \"STOOLCX\"      Assessment    End stage renal disease on HD MWF  Hypertension  Anemia of CKD  Peripheral vascular disease--s/p angiogram on 5/22 and multiple balloon angioplasties in the left lower extremity on 5/30  Thrombocytopenia  Hypokalemia--improved  Hyponatremia     Plan:   Dialysis is due next on June " 2  Follow-up with vascular      Bolivar Rueda MD  6/1/2025  12:40 CDT

## 2025-06-01 NOTE — PLAN OF CARE
Goal Outcome Evaluation:           Progress: improving  Outcome Evaluation: C/o of bilateral foot pain. Percocets given with good pain relief.  Unable to obtain pulses bilaterally. However, family states that the color of feet have improved.

## 2025-06-01 NOTE — PROGRESS NOTES
"    [facility]  INPATIENT PROGRESS NOTE    Patient Name: Ricardo Hugo  Date of Admission: 5/21/2025  Today's Date: 06/01/25  Length of Stay: 11  Primary Care Physician: Nathan Zimmer MD    Subjective   Chief Complaint: follow-up LLE pain  HPI   Patient reported \"Dr. Pineda got through.\"  \"I'm so proud of him.\"  He reports improving discomfort in his left lower extremity.  He does report having some cough and congestion, but states that his cough is mostly nonproductive.  He denies any shortness of breath symptoms.  He has not been requiring any oxygen.  No family at bedside today.    Review of Systems   All pertinent negatives and positives are as above. All other systems have been reviewed and are negative unless otherwise stated.     Objective    Temp:  [98.6 °F (37 °C)-99.4 °F (37.4 °C)] 98.8 °F (37.1 °C)  Heart Rate:  [68-96] 81  Resp:  [14-16] 16  BP: (130-166)/(50-69) 130/50  Physical Exam  Vitals reviewed.   Constitutional:       General: He is not in acute distress.  HENT:      Head: Normocephalic.      Mouth/Throat:      Mouth: Mucous membranes are moist.   Pulmonary:      Effort: Pulmonary effort is normal. No respiratory distress.      Comments: On room air; no prominent rhonchi or rales  Skin:     General: Skin is warm.      Comments: Compared to my last exam with Mr. Hugo (> 1 week ago) his left lower extremity is less tender, more warm and better perfused.   Neurological:      Mental Status: He is alert.      Motor: Weakness present.   Psychiatric:         Mood and Affect: Mood normal.         Results Review:  I have reviewed the labs, radiology results, and diagnostic studies.    Laboratory Data:   Results from last 7 days   Lab Units 06/01/25  0304 05/31/25  0431 05/30/25  1426   WBC 10*3/mm3 9.21 7.12 7.53   HEMOGLOBIN g/dL 7.6* 7.4* 7.5*   HEMATOCRIT % 24.2* 22.3* 23.0*   PLATELETS 10*3/mm3 120* 112* 114*        Results from last 7 " "days   Lab Units 06/01/25  0304 05/31/25  0431 05/30/25  1443 05/30/25  1426   SODIUM mmol/L 137 137  --  138   SODIUM, ARTERIAL mmol/L  --   --  137  --    POTASSIUM mmol/L 4.5 4.1  --  3.8   CHLORIDE mmol/L 101 102  --  101   CO2 mmol/L 20.0* 23.0  --  26.0   BUN mg/dL 41.9* 27.3*  --  19.5   CREATININE mg/dL 3.68* 2.69*  --  1.86*   CALCIUM mg/dL 8.5* 7.9*  --  8.4*   GLUCOSE mg/dL 98 97  --  102*       Culture Data:   No results found for: \"BLOODCX\", \"URINECX\", \"WOUNDCX\", \"MRSACX\", \"RESPCX\", \"STOOLCX\"    Radiology Data:   Imaging Results (Last 24 Hours)       ** No results found for the last 24 hours. **            I have reviewed the patient's current medications.     Assessment/Plan   Assessment  Active Hospital Problems    Diagnosis     **Arterial occlusion     Thrombocytopenia     Acute pain of left lower extremity     ESRD (end stage renal disease) on dialysis     Abnormal TSH     Chronic diastolic (congestive) heart failure     Anticoagulated     Sensorineural hearing loss (SNHL), bilateral     Eustachian tube dysfunction, bilateral     Nasal septal deviation     Mixed hyperlipidemia     CLL (chronic lymphocytic leukemia)     Anemia due to chronic kidney disease     Diastolic dysfunction     Symptomatic anemia     Resistant hypertension     Peripheral arterial disease     IHD (ischemic heart disease)     Essential hypertension     Chronic rhinitis        Treatment Plan  Most recent Vascular intervention performed by Dr. Pineda on 5/30/2025  Dialysis regimen per Nephrology; next due tomorrow on 6/2 (patient on dialysis every M/W/F in outpatient setting)  Hgb stable at 7.6.  Patient status post transfusion of packed red blood cells.  Continue Retacrit  Continue PO Niferex  Currently on IV Zosyn and will transition to IV Unasyn - day 4 of Abxs  Patient on room air  PT/OT  Holding hydralazine; monitor BP trend  Continue ASA/Plavix/statin  Dispo planning: looking into SNF placement ([facility] " potentially)      Medical Decision Making  Number and Complexity of problems: Moderate complexity      Conditions and Status        Condition is improving.     [facility] Data  External documents reviewed: none  Cardiac tracing (EKG, telemetry) interpretation: no new EKGs  Radiology interpretation: no new radiology studies  Labs reviewed: as above  Any tests that were considered but not ordered: none     Decision rules/scores evaluated (example [id number], [place], etc): none     Discussed with: patient     Care Planning  Shared decision making: discussed with patient with agreement to proceed with treatment plan as outlined  Code status and discussions: Full Code    Disposition  Social Determinants of Health that impact treatment or disposition: none apparent at this time  I expect the patient to be discharged to SNF when bed available and insurance approved.        Electronically signed by Dinesh Camarena MD, 06/01/25, 14:19 CDT.

## 2025-06-01 NOTE — DISCHARGE PLACEMENT REQUEST
"Ricardo Coy \"Harjinder\" (75 y.o. Male)       Date of Birth   1950    Social Security Number       Address   [address]  [place] [address]    Home Phone   [phone]    MRN   [id number]       Synagogue   Other    Marital Status                               Admission Date   5/21/2025    Admission Type   Emergency    Admitting Provider   Dinesh Camarena MD    Attending Provider   Dinesh Camarena MD    Department, Room/Bed   [facility] 3C, 389/1       Discharge Date       Discharge Disposition       Discharge Destination                                 Attending Provider: Dinesh Camarena MD    Allergies: Ondansetron, Zofran [Ondansetron Hcl], Lortab [Hydrocodone-acetaminophen], Allopurinol    Isolation: None   Infection: None   Code Status: CPR    Ht: 182.9 cm (72\")   Wt: 61.6 kg (135 lb 14.4 oz)    Admission Cmt: None   Principal Problem: Arterial occlusion [I70.90]                   Active Insurance as of 5/21/2025       Primary Coverage       Payor Plan Insurance Group Employer/Plan Group    MEDICARE MEDICARE A & B        Payor Plan Address Payor Plan Phone Number Payor Plan Fax Number Effective Dates    [address] [phone]  2/1/2013 - None Entered    [facility] [address]         Subscriber Name Subscriber Birth Date Member ID       RICARDO COY 1950 [id number]               Secondary Coverage       Payor Plan Insurance Group Employer/Plan Group     FOR LIFE  FOR LIFE  SUP         Payor Plan Address Payor Plan Phone Number Payor Plan Fax Number Effective Dates    [address] [phone]  1/26/2017 - None Entered    [facility] [id number]         Subscriber Name Subscriber Birth Date Member ID       RICARDO COY 1950 [id number]                     Emergency Contacts        (Rel.) Home Phone Work Phone Mobile Phone    jesi chavez (Daughter) -- -- [phone]    Leoindas Coy (Son) [phone] -- --    " ramona lomas ([place]) -- -- [phone]

## 2025-06-01 NOTE — PROGRESS NOTES
Assessment tool to be used for patients with existing breathing treatments ordered by hospitalist                               Respiratory Therapist Driven Protocol - RT to Assess and Treat Algorithm    Item 0 Points 1 Point 2 Points 3 Points 4 Points Subtotal   Mental Status Alert, orientated, cooperative Lethargic, follows commands Confused, not following commands Obtunded or Somnolent Comatose 0   Respiratory Pattern Regular RR  8-16 breaths/minute Increased RR  18-25 breaths/minute Dyspnea on exertion, irregular RR  26-30/minute Shortness of breath,  RR 31-35 breaths/minute Accessory muscle use, severe SOB  RR > 35 breath/minute 0   Breath Sounds Clear Decreased unilaterally Decreased bilaterally Basilar crackles Wheezing and/or rhonchi 0   Cough Strong, spontaneous non-productive Strong productive Weak, non-productive Weak productive or weak with rhonchi Absent or may require suctioning 0   Pulmonary Status Nonsmoker or no previous history > 1 year quit < 1 PPD  < 1 year quit >  or = 1 PPD Diagnosed pulmonary disease (severe or chronic) Severe or chronic pulmonary disease with exacerbation 3   Surgical Status None General surgery (non-abdominal or non-thoracic) Lower abdominal Thoracic or upper abdominal Thoracic with pulmonary disease 0   Chest X-ray Clear Chronic changes Infiltrates, atelectasis or pleural effusion Infiltrates > 1 lobe Diffuse infiltrates and atelectasis and/or effusions na   Activity level Ambulatory Ambulatory with assistance Non-ambulatory Paraplegic Quadriplegic 1                     Total Score 4   Score    Drug Therapy Frequency  20 or >    Q4 Duoneb & Q3 Albuterol PRN 15 - 19     Q6 Duoneb & Q4 Albuterol PRN 10 - 14    QID Duoneb & Q4 Albuterol PRN 5 - 9    TID Duoneb & Q6 Albuterol PRN 0 - 4    Q4 PRN Duoneb or Q4 PRN Albuterol    Incentive Spirometry - Initial RT instruct    Lung Expansion Therapy (PEP) Bronchopulmonary Hygiene (CPT)   Q4 & PRN - Severe  atelectasis, poor oxygenation Q4 - copious secretions, dyspnea, unable to sleep, mucus plugging   QID - High risk for persistent atelectasis, existence of atelectasis QID & Q4 PRN - Moderate secretion production   TID - At risk for developing atelectasis TID - small amounts of secretions with poor cough   BID - prevention of atelectasis BID - unable to breathe deeply and cough spontaneously   *RT Protocol patients will be re-assessed/re-evaluated every 48 hours.    *Patients who are home nebulizer treatments will be protocoled to no less than their home regimen and will remain     on their home regimen with re-evaluations as needed with changes in patient condition.    RT Comments/Recommendations: continue with current treatment regiment and reevaluate in 48 hrs.

## 2025-06-01 NOTE — PLAN OF CARE
Goal Outcome Evaluation:  Plan of Care Reviewed With: patient        Progress: no change  Outcome Evaluation: voices no c/o of discomfort at this time.

## 2025-06-02 LAB
ANION GAP SERPL CALCULATED.3IONS-SCNC: 11 MMOL/L (ref 5–15)
BUN SERPL-MCNC: 53.6 MG/DL (ref 8–23)
BUN/CREAT SERPL: 12 (ref 7–25)
CALCIUM SPEC-SCNC: 8.5 MG/DL (ref 8.6–10.5)
CHLORIDE SERPL-SCNC: 103 MMOL/L (ref 98–107)
CO2 SERPL-SCNC: 22 MMOL/L (ref 22–29)
CREAT SERPL-MCNC: 4.48 MG/DL (ref 0.76–1.27)
DEPRECATED RDW RBC AUTO: 67.7 FL (ref 37–54)
EGFRCR SERPLBLD CKD-EPI 2021: 12.8 ML/MIN/1.73
ERYTHROCYTE [DISTWIDTH] IN BLOOD BY AUTOMATED COUNT: 18.2 % (ref 12.3–15.4)
GLUCOSE SERPL-MCNC: 104 MG/DL (ref 65–99)
HAV IGM SERPL QL IA: NORMAL
HBV CORE IGM SERPL QL IA: NORMAL
HBV SURFACE AG SERPL QL IA: NORMAL
HCT VFR BLD AUTO: 23.1 % (ref 37.5–51)
HCV AB SER QL: NORMAL
HGB BLD-MCNC: 7.4 G/DL (ref 13–17.7)
MCH RBC QN AUTO: 32.5 PG (ref 26.6–33)
MCHC RBC AUTO-ENTMCNC: 32 G/DL (ref 31.5–35.7)
MCV RBC AUTO: 101.3 FL (ref 79–97)
PLATELET # BLD AUTO: 127 10*3/MM3 (ref 140–450)
PMV BLD AUTO: 9.9 FL (ref 6–12)
POTASSIUM SERPL-SCNC: 4.6 MMOL/L (ref 3.5–5.2)
RBC # BLD AUTO: 2.28 10*6/MM3 (ref 4.14–5.8)
SODIUM SERPL-SCNC: 136 MMOL/L (ref 136–145)
WBC NRBC COR # BLD AUTO: 8.08 10*3/MM3 (ref 3.4–10.8)

## 2025-06-02 PROCEDURE — 25010000002 EPOETIN ALFA-EPBX 2000 UNIT/ML SOLUTION 1 ML VIAL: Performed by: SURGERY

## 2025-06-02 PROCEDURE — 25010000002 AMPICILLIN-SULBACTAM PER 1.5 G: Performed by: INTERNAL MEDICINE

## 2025-06-02 PROCEDURE — 80048 BASIC METABOLIC PNL TOTAL CA: CPT | Performed by: SURGERY

## 2025-06-02 PROCEDURE — 25010000002 EPOETIN ALFA-EPBX 3000 UNIT/ML SOLUTION 1 ML VIAL: Performed by: SURGERY

## 2025-06-02 PROCEDURE — 80074 ACUTE HEPATITIS PANEL: CPT | Performed by: INTERNAL MEDICINE

## 2025-06-02 PROCEDURE — 85027 COMPLETE CBC AUTOMATED: CPT | Performed by: SURGERY

## 2025-06-02 PROCEDURE — 25010000002 HEPARIN (PORCINE) PER 1000 UNITS: Performed by: SURGERY

## 2025-06-02 RX ADMIN — Medication 150 MG: at 13:24

## 2025-06-02 RX ADMIN — CYCLOBENZAPRINE 5 MG: 10 TABLET, FILM COATED ORAL at 14:10

## 2025-06-02 RX ADMIN — PANTOPRAZOLE SODIUM 40 MG: 40 TABLET, DELAYED RELEASE ORAL at 05:37

## 2025-06-02 RX ADMIN — CLOPIDOGREL BISULFATE 75 MG: 75 TABLET, FILM COATED ORAL at 13:24

## 2025-06-02 RX ADMIN — MONTELUKAST SODIUM 10 MG: 10 TABLET, COATED ORAL at 20:55

## 2025-06-02 RX ADMIN — OXYCODONE HYDROCHLORIDE AND ACETAMINOPHEN 1 TABLET: 5; 325 TABLET ORAL at 20:55

## 2025-06-02 RX ADMIN — CARVEDILOL 25 MG: 25 TABLET, FILM COATED ORAL at 16:51

## 2025-06-02 RX ADMIN — Medication 10 ML: at 13:26

## 2025-06-02 RX ADMIN — AMPICILLIN SODIUM, SULBACTAM SODIUM 3 G: 2; 1 INJECTION, POWDER, FOR SOLUTION INTRAMUSCULAR; INTRAVENOUS at 03:57

## 2025-06-02 RX ADMIN — AMPICILLIN SODIUM, SULBACTAM SODIUM 3 G: 2; 1 INJECTION, POWDER, FOR SOLUTION INTRAMUSCULAR; INTRAVENOUS at 15:07

## 2025-06-02 RX ADMIN — TAMSULOSIN HYDROCHLORIDE 0.4 MG: 0.4 CAPSULE ORAL at 20:55

## 2025-06-02 RX ADMIN — LEVOTHYROXINE SODIUM 100 MCG: 0.1 TABLET ORAL at 05:37

## 2025-06-02 RX ADMIN — OXYCODONE HYDROCHLORIDE AND ACETAMINOPHEN 1 TABLET: 5; 325 TABLET ORAL at 12:34

## 2025-06-02 RX ADMIN — ASPIRIN 81 MG: 81 TABLET, COATED ORAL at 13:25

## 2025-06-02 RX ADMIN — Medication 10 ML: at 20:56

## 2025-06-02 RX ADMIN — EPOETIN ALFA-EPBX 5000 UNITS: 3000 INJECTION, SOLUTION INTRAVENOUS; SUBCUTANEOUS at 13:25

## 2025-06-02 RX ADMIN — ALPRAZOLAM 0.25 MG: 0.25 TABLET ORAL at 20:55

## 2025-06-02 RX ADMIN — ISOSORBIDE DINITRATE 10 MG: 10 TABLET ORAL at 16:51

## 2025-06-02 RX ADMIN — ISOSORBIDE DINITRATE 10 MG: 10 TABLET ORAL at 20:59

## 2025-06-02 RX ADMIN — CLONIDINE HYDROCHLORIDE 0.3 MG: 0.1 TABLET ORAL at 20:55

## 2025-06-02 RX ADMIN — CARVEDILOL 25 MG: 25 TABLET, FILM COATED ORAL at 20:59

## 2025-06-02 RX ADMIN — CALCITRIOL CAPSULES 0.25 MCG 0.5 MCG: 0.25 CAPSULE ORAL at 13:24

## 2025-06-02 RX ADMIN — ISOSORBIDE DINITRATE 10 MG: 10 TABLET ORAL at 13:24

## 2025-06-02 RX ADMIN — HEPARIN SODIUM 3200 UNITS: 1000 INJECTION INTRAVENOUS; SUBCUTANEOUS at 12:48

## 2025-06-02 RX ADMIN — ATORVASTATIN CALCIUM 10 MG: 10 TABLET, FILM COATED ORAL at 13:24

## 2025-06-02 RX ADMIN — GABAPENTIN 300 MG: 300 CAPSULE ORAL at 20:55

## 2025-06-02 RX ADMIN — OXYCODONE HYDROCHLORIDE AND ACETAMINOPHEN 1 TABLET: 5; 325 TABLET ORAL at 16:52

## 2025-06-02 NOTE — PLAN OF CARE
Goal Outcome Evaluation:           Progress: no change                              Patient has not required prn pain medication. IID. IV ABX. No distress noted.

## 2025-06-02 NOTE — NURSING NOTE
[facility]  DIALYSIS TREATMENT SUMMARY     Note: Consult with the attending physician for patient treatment orders, this document is not a physician order.     Patient Information  Patient Ricardo Hugo  Date of Birth February 22, 1950  Chart Number [id number]  Location [facility]  Location MRN [id number]  Gender Male  SSN (last 4)   Treatment Information  Treatment Type Hemodialysis  Treatment Id [id number]  Start Time June 02, 2025 08:50  End Time June 02, 2025 12:45  Acutal Duration 03:55  Treatment Balances  Total Saline Administered 500  Net Fluid Balance -1850  Hemodialysis Orders  Therapy Standard  Orders Verified Time 06/02/2025 07:45   Date Verified 06/02/2025  Duration 3:45  Isolated UF/Bypass No  BFR (mL) 450  DFR X1.5 BFR  DFR (mL) 700  Dialyzer Type OPTIFLUX 160NR  UF Order UF Range  UF Range (mL) 1700 - 2700  With BP Parameters Yes  As Tolerated Yes  Crit-Line used No  Heparin Initial Units Bolus No  Heparin IV Maintenance Bolus No  Heparin IV Infusion No  Potassium (mEq/L) 3  Calcium (mEq/L) 2.5  Bicarb (mg/dL) 37  Sodium (mEq/L) 138  Additional Orders KEEP SYSTOLIC BLOOD PRESSURE GREATER THAN 89  Clinician Nav Will, DEMETRIUS  Dialysis Access  Access Type Central Venous Catheter  Central Venous Catheter  Access Type Catheter - Tunneled  Access Location Chest Wall - R  1st Use Catheter Verified by X-Ray  Catheter Care Completed per Policy Yes  Dressing Dry and Intact on Arrival Yes  Dressing Changed Yes  Type of Dressing Film Biopatch/CHG  Dressing Changed By [facility] Staff  Type of HD Caps Curos  HD Caps Changed Yes  CVC Line Education Provided Yes - Dressing Change Frequency  Comments/Notes RIGHT IJ PERMCATH, PATENT X 2 PORTS, SITE CLEAR  Vitals  Pre-Treatment Vitals  Time Is BP being recorded? Pre BP Map BP Method Pulse RR Temp How was Weight Obtained? Pre Weight Previous Dry Weight Previous Post Weight Metric Target Fluid Removal (mL) Dialysate  Confirmed Clinician  06/02/2025 08:47 BP/Map 107/50 (69) Noninvasive 67 18 97.7 How Obtained: Bed Scale 61.5   Kgs 1700 Yes Nav Will, RN  Comments: PATIENT IDENTIFICATION VERIFIED, ALLERGIES AND MEDICATIONS REVIEWED. PATIENT IS ALERT, ORIENTED X 2, CONFUSED. SLEEPY. NO PAIN, NO CHEST PAIN. RESPIRATIONS EVEN AND UNLABORED. LUNGS DIMINISHED. HEART RATE REGULAR, S1 S2, SINUS RHYTHM PER MONITOR. SKIN WARM AND DRY. +1 EDEMA LEGS. LAYING IN BED, HEAD OF BED ELEVATED 30 DEGREES.  Intra Procedure Vitals  Rec Type Time Is BP being recorded? BP Map Pulse RR BFR DFR AP  TMP UFR RMVD Bolus NSS Flush Chills Safety Check Crit-Line HCT Crit-Line BV% Clinician  E 06/02/2025 08:50 BP/Map 105/43 (64) 67 18 150 300 -30 30 70 850 0  250  Yes   Nav Will, RN  Comments: HEMODIALYSIS INITIATED USING RIGT IJ PERMCATH. ACCESSED PER STERILE TECHNIQUE. PORTS PATENT X 2, LINES CONNECTED, SECURED, VISIBLE. HEMOCLIP SAFETY DEVICES INTACT X 2. PATIENT IS ALERT, ORIENTED X 2, CONFUSED, SLEEPY. NO PAIN, NO CHEST PAIN. RESPIRATIONS EVEN AND UNLABORED. LUNGS DIMINISHED. HEART RATE REGULAR, S1 S2, SINUS RHYTHM PER MONITOR. SKIN WARM AND DRY. +1 EDEMA LEGS. LAYING IN BED, HEAD OF BED ELEVATED 30 DEGREES.  E 06/02/2025 08:51 BP/Map 105/43 (64) 67 18 450 700 -170 120 60 850 14  0  Yes   Nav Will, RN  Comments: INCREASED BLOOD FLOW RATE AND DIALYSATE FLOW RATE PER ORDER.  E 06/02/2025 09:04 BP/Map 110/50 (70) 65 18 450 700 -200 120 70 850 194  0  Yes   Nav Will, RN  Comments: PATIENT IS RESTING, EYES CLOSED, FLACC 0. RESPIRATIONS EVEN AND UNLABORED. HEART RATE REGULAR, S1 S2, SINUS RHYTHM PEER MONITOR. SKIN WARM AND DRY. LAYING IN BED, HEAD OF BED ELEVATED 30 DEGREES. LINES VISIBLE AND SECURE. NO SIGNS OF DISTRESS NOTED.  E 06/02/2025 09:30 BP/Map 118/46 (70) 67 20 450 700 -210 120 90 850 558  0  Yes   Nav Will, RN  Comments: PATIENT IS RESTING, EYES CLOSED. FLACC 0. RESPIRATIONS EVEN AND UNLABORED. HEART RATE REGULAR, S1 S2,  SINUS RHYTHM PER MONITOR. SKIN WARM AND DRY. LAYING IN BED, HEAD OF BED ELEVATED 30 DEGREES. LINES VISIBLE AND SECURE.  E 06/02/2025 10:00 BP/Map 106/51 (69) 60 20 450 700 -210 130 90 850 980  0  Yes   Nav Will, DEMETRIUS  Comments: PATIENT IS RESTING, EYES CLOSED, FLACC 0. RESPIRATIONS EVEN AND UNLABORED. HEART RATE REGULAR, S1 S2, SINUS RHYTHM PER MONITOR. SKIN WARM AND DRY. LAYING IN BED, HEAD OF BED ELEVATED 20 DEGREES. LINES VISIBLE AND SECURE. NO SIGNS OF DISTRESS.  E 06/02/2025 10:30 BP/Map 100/55 (70) 68 20 450 700 -200 140 90 570 1395  0  Yes   Nav Will, DEMETRIUS  Comments: PATIENT IS ALERT, ORIENTED X 3, NO PAIN, NO CHEST PAIN. RESPIRATIONS EVEN AND UNLABORED. HEART RATE REGULAR, S1 S2, SINUS RHYTHM PER MONITOR. SKIN WARM AND DRY. LAYING IN BED, HEAD OF BED ELEVATED 30 DEGREES. UF GOAL WAS DECREASED TO 2600,  AS TOLERATED FOR BLOOD PRESSURE SUPPORT, PREVENT CRAMPING  E 06/02/2025 11:00 BP/Map 94/47 (63) 69 18 450 700 -200 140 70 380 1685  0  Yes   Nav Will, DEMETRIUS  Comments: DR. ARREDONDO HERE ROUNDING ON PATIENT. DECREASED UF GOAL TO 2300,  AS TOLERATED FOR BLOOD PRESSURE SUPPORT. PATIENT IS ALERT, ORIENTED X 3, NO PAIN, NO CHEST PAIN. RESPIRATIONS EVEN AND UNLABORED. HEART RATE REGULAR, S1 S2, SINUS RHYTHM PER MONITOR. SKIN WARM AND DRY. LAYING IN BED, HEAD OF BED ELEVATED 30 DEGREES. LINES VISIBLE AND SECURE.  E 06/02/2025 11:30 BP/Map 111/53 (72) 67 20 450 700 -210 130 70 380 1873  0  Yes   Nav Will, DEMETRIUS  Comments: PATIENT IS RESTING, EYES CLOSED, FLACC 0. RESPIRATIONS EVEN AND UNLABORED. HEART RATE REGULAR, S1 S2, SINUS RHYTHM PER MONITOR. SKIN WARM AND DRY. LAYING IN BED, LINES VISIBLE AND SECURE. NO SIGNS OF DISTRESS NOTED  E 06/02/2025 12:00 BP/Map 107/50 (69) 70 18 450 700 -200 130 70 380 2060  0  Yes   Nav Will, DEMETRIUS  Comments: PATIENT IS ALERT, ORIENTED X 3, NO PAIN, NO CHEST PAIN. RESPIRATIONS EVEN AND UNLABORED. HEART RATE REGULAR, S1 S2, SINUS RHYTHM PER MONITOR. SKIN WARM  AND DRY. LAYING IN BED, HEAD OF BED ELEVATED 30 DEGREES. LINES VISIBLE AND SECURE.  E 06/02/2025 12:30 BP/Map 119/47 (71) 74 18 450 700 -200 130 70 420 2245  0  Yes   Nav Will, DEMETRIUS  Comments: PATIENT IS ALERT, ORIENTED X 3, RESPIRATIONS EVEN AND UNLABORED. HEART RATE REGULAR, S1 S2, SINUS RHYTHM PER MONITOR. SKIN WARM AND DRY. LAYING IN BED, HEAD OF BED ELEVATED 30 DEGREES. LINES VISIBLE AND SECURE.  E 06/02/2025 12:45 BP/Map 120/45 (70) 69 18 150 500 -10 30 60 300 2350  250  Yes   Nav Will, RN  Comments: HEMODIALYSIS FINISHED. BLOOD RETURNED  Post Treatment Vitals  Time Is BP being recorded? BP Map Pulse RR Temp How was Weight Obtained? Post Weight Metric BVP UF Goal Ordered NSS Given Intra-Procedure Total Machine UF Removed (mL) Crit-Line Ending Profile Crit-Line Refill Crit-Line Ending HCT Crit-Line Max BV% Clinician  06/02/2025 13:00 BP/Map 131/52 (78) 69 18 97.8 How Obtained: Bed scale 59.7 Kgs 96 7711 211 7986     Nav Will, DEMETRIUS  Comments: PATIENT IS ALERT, ORIENTED X 3, NO PAIN, NO CHEST PAIN, RESPIRATIONS EVEN AND UNLABORED. LUNGS CLEAR AND DIMINISHED. HEART RATE REGULAR, S1 S2, SINUS RHYTHM PER MONITOR. SKIN WARM AND DRY, NO EDEMA. TOLERATED TREATMENT WELL.  Safety checks include: access uncovered and secured, Hemaclip secured for all central line access, machine checks performed, and alarm limits confirmed.  Labs  Hepatitis  HBsAG Lab Result HBsAG Lab Result HBsAG Draw Date Transient Antigenemia(MD Diagnosis Only) Anti-HBs Lab Result Anti-HBs Lab Value Anti-HBs Draw Date Documented By Documented Date Hepatitis Status Hepatitis Status  Negative  05/05/2025  Negative  04/30/2025 Nav Will 06/02/2025  Susceptible  Notes:   Pre-Treatment Hepatitis Precautions Patient tx outside buffer zone Yes Hepatitis Information Entered By Nav Will RN  Patient tx at bedside 1:1 Yes Hard copy verified by nurse and placed in patient’s hospital chart Yes Signing  Patient tx with immune pts only Yes Copy  of hepatitis results verified in hospital EMR Yes Signed By Nav Will RN  Pre-Treatment Handoff  Staff Report Received Yes  Report Given by Primary Nurse CARLA HAAS RN  Time 07:40  Patient Arrival  Patient ID Verified Date of Birth  MRN  Full Name  Patient Consent to treatment verified Yes  Blood Transfusion Consent Verified N/A  Treatment Comments  Treatment Notes PATIENT IS ALERT, ORIENTED X 3, NO PAIN, NO CHEST PAIN. RESPIRAIOTNS 18 EVEN AND UNLABORED. LUNGS CLEAR AND DIMINISHED. HEART RATE REGULAR, S1 S2, SINUS RHYTHM PER MONITOR AT 69. BLOOD PRESSURE 131/52, MAP 78, TEMPERATURE 97.8. SKIN WARM AND DRY, NO EDEMA. REMOVED 1850 ML OF FLUID. POST WEIGT IS 59.7 KG. TOLERATED TREATMENT WELL.  Post-Treatment Handoff  Report Given to Primary Nurse CARLA HAAS RN  Time Report Given 13:10  Report Given By Nav Will RN  Machine Validation  Time 08:05  Date 6/2/2025  Auto Alarm Test Passed Yes  Machine Serial # [id number]  Portable RO Yes  RO Serial# [id number]  Residual Bleach Negative Yes  Was a manufactured mix used? Yes  Machine Log Completed Yes  Total Chlorine (less than 0.1)? Yes  Total Chlorine Log Completed Yes  Bicarb BiBag  Bibag Size 900  Machine Temp 36.7  Machine Conductivity 13.6  Meter Type N/A  Meter Conductivity   Independent Conductivity 13.6  pH Status Pass Pass  pH   TCD Value 13.7  TCD Alarm with +/- 0.5 Yes  NVL enabled validated 100 asymmetric Yes  Safety check complete Nav Will RN  Second Verification Performed? Yes  Second Verification Performed By Nav Will RN  Reason Not Verified   Serum Lab Values  Time BUN Creatinine Na K (mEq/L) Cl CO2 Ca (mEq/L) Phos Mg (mg/dL) Alb (g/dL) Glucose Hgb Hct WBC Plt PT aPTT INR Other Clinician  06/02/2025 06:43 53.6 4.48 136 4.6 103 22 8.5 - - - 104 7.4 23.1 8.08 127 - - - - Nav Will RN  Comments:   Administered Medications  Time Medication Dose Route Reason for Admin Clinician  06/02/2025 12:48 HEPARIN 1000 UNITS / ML 3.2 ML /  3200 UNITS IVP CATHETER LOCKS Nav Will RN  Comments: POST DIALYSIS, INSTILL HEPARIN 1000 UNITS / ML, 1.6 ML, 1600 UNITS INTO EACH PORT OF RIGHT IJ PERMCATH, X 2 PORTS, 3.2 ML TOTAL, 3200 UNITS HEPARIN TOTAL AS CATHETER LOCKS.  Facility Information Room # 389 - 3C Diagnosis ESRD; OCCLUDED ARTERIES; S/P PTA L. SFA AND EXTERNAL ILIAC ARTERY WITH BALLOONING AND STENT; HYPERTENSION; CLL; THROMBOCYTOPENIA  Facility Information Stat Treatment No Isolation Information  Admission Date 05/21/2025 Patient Type Chronic dialysis patient with diagnosis of ESRD Isolation Required? N  Ordering MD DR. ARREDONDO Patient Chronic Unit [facility] Completed by  Account/Finance Number [id number] Patient Home Unit [address] [place] [address] information Entered by Nav Will RN  Admission Status InPatient Code Status Full Code   Location Dialysis Suite Multi Diagnosis   Start Treatment Time Out Confirmed by PEDRO JAMISON RN AND NAV WILL RN Correct access site verified Yes  Treatment Initiation Connections Secured Time Out Completed 08:44 Treatment Start Date 06/02/2025  Saline line double clamped Correct patient verified Yes Treatment Start Time 08:50  Hemaclip Applied Correct procedure verified Yes Patient/Family questions and concerns addressed Yes  Pre Focused Assessment Edema Grade 1+  Confused  Access Notes +1 EDEMA LEGS GI / Bowels  Signs and Symptoms of Infection? No Cardiac GI Soft  Notes RIGHT IJ PERMCATH, PATENT X 2 PORTS, SITE CLEAR Heart Rhythm Regular  Bowel sounds present  Pain Screening Telemetry Yes  Non distended  Does the patient have pain? No Notes SINUS RHYTHM   Respiratory Skin  Voids  Lung Sounds Diminished Skin Warm Completed by  Location Bilateral  Dry Pre Treatment Focused Assessment Completed By Nav Will RN  Position Anterior  Bruising Time 08:45  Respiratory Efforts Unlabored LOC Signing  Notes RESPIRATIONS 18 LOC Oriented to Person Signed By Nav Will RN  Edema  Oriented to  Place   Location Generalized  Alert   Education  Fluid Intake  Medications  Patient Education  Hemodialysis treatment review  Diet and/or exercise  Patient Educated? Yes Method Knowledge / Understanding Assessed Teach back  Fluid Intake  Focus or Topic Treatment Options Family Education Provided? N/A  Hemodialysis treatment review  Access Maintenance Caregiver Education Provided? Yes Method Knowledge / Understanding Assessed Teach back  Medications Focus or Topic Treatment Options Patient Education Introduced By  Diet and/or exercise  Access Maintenance Patient Education Introduced By Nav Will RN  Post-Treatment HD machine external disinfection completed per policy Yes Notes VERBAL REPORT GIVEN TO ACRLA HAAS RN  Post Treatment Delay PRO external disinfection completed per policy Yes Completed by  Delay N Post Treatment General Information Post Treatment Form Completed By Nav Will RN  Machine Disinfection Requirement Care Transition Document Completed Yes   Post Focused Assessment Lung Sounds Diminished  Bruising  Changes from Pre Focused Assessment  Clear LOC  Changes from Pre Treatment Focused Assessment? Yes Location Bilateral LOC Alert and Oriented x3  Access Position Anterior GI / Bowels  Cath Packed with HEPARIN 1000 UNITS / ML Respiratory Efforts Unlabored GI Soft  Access Port(ml) 1.6 Notes RESPIRATIONS 18  Bowel sounds present  Return Port(ml) 1.6 Edema  Non distended  Catheter clamped and capped Yes Location None   Access Flow Good Cardiac  Voids  Dialyzer Clearance Streaked Heart Rhythm Regular Completed by  Dialyzer Clearance Times RIGHT IJ PERMCATH, FLUSHED, HEPARINIZED, CLAMPED, CAPPED Telemetry Yes Post Treatment Focused Assessment Completed by Nav Will RN  Notes RIGHT IJ PERMCATH, FLUSHED, HEPARINIZED, CLAMPED, CAPPED Notes SINUS RHYTHM Date 06/02/2025  Pain Screening Skin Time 13:02  Does the patient have pain? No Skin Warm Signing  Respiratory  Dry Signed By Nav Will  RN

## 2025-06-02 NOTE — CASE MANAGEMENT/SOCIAL WORK
Continued Stay Note   [place]     Patient Name: Ricardo Hugo  MRN: [id number]  Today's Date: 6/2/2025    Admit Date: 5/21/2025    Plan: Rosalina   Discharge Plan       Row Name 06/02/25 1245       Plan    Plan Rosalina    Patient/Family in Agreement with Plan yes    Plan Comments [facility] has offered a bed. Pt in dialysis so informed his daughter Kathleen [phone]. Pt goes to HD MWF at 0645 but time has been changed to 1100 due to family not being able to transport pt. [facility] is arranging PATS. Pharmacy ([facility] in [place]) updated in Epic.                   Discharge Codes    No documentation.                 Expected Discharge Date and Time       Expected Discharge Date Expected Discharge Time    Jay 3, 2025               LINNETET Ramos

## 2025-06-02 NOTE — PROGRESS NOTES
Nephrology ([facility]) Progress Note      Patient:  Ricardo Hugo  YOB: 1950  Date of Service: 6/2/2025  MRN: [id number]   Acct: [id number]   Primary Care Physician: Nathan Zimmer MD  Advance Directive:   Code Status and Medical Interventions: CPR (Attempt to Resuscitate); Full Support   Ordered at: 05/21/25 1822     Code Status (Patient has no pulse and is not breathing):    CPR (Attempt to Resuscitate)     Medical Interventions (Patient has pulse or is breathing):    Full Support     Admit Date: 5/21/2025       Hospital Day: 12  Referring Provider: No ref. provider found      Patient personally seen and examined.  Complete chart including Consults, Notes, Operative Reports, Labs, Cardiology, and Radiology studies reviewed as able.        Subjective:  Ricardo Hugo is a 75 y.o. male for whom we were consulted for evaluation and treatment of end stage renal disease on hemodialysis Monday Wednesday Friday.  Presented with left lower leg pain. Recently had right lower extremity angiogram which has significantly improved the pain he was having in his right leg. Dr Zabala took patient to OR on 5/22 for angiogram and stenting of left external iliac artery.  Has been tolerating inpatient HD well since arrival. Unfortunately pain in left leg has persisted and a left AKA has been considered. Tolerated HD well on 5/28. Had angioplasty of left lower extremity on 5/20 and some blood flow was restored. Pain has since been improving.     Today is awake and alert. No new complaint or overnight issues. Seen during HD and tolerating well  Dialysis   Patient was seen and evaluated on renal replacement therapy and I have personally evaluated the patient and directed the therapy  Modality: Hemodialysis  Access: Catheter  Location: right upper  QB: 450  QD: 700  UF: up to 2700 as tolerated      Allergies:  Ondansetron, Zofran [ondansetron hcl], Lortab [hydrocodone-acetaminophen], and  Allopurinol    Home Meds:  Medications Prior to Admission   Medication Sig Dispense Refill Last Dose/Taking    ALPRAZolam (XANAX) 0.25 MG tablet Take 1 tablet by mouth Every Night.   Taking    aspirin (aspirin) 81 MG EC tablet Take 1 tablet by mouth Daily.   Taking    atorvastatin (LIPITOR) 10 MG tablet Take 1 tablet by mouth Daily. 90 tablet 3 Taking    calcitriol (ROCALTROL) 0.5 MCG capsule Take 1 capsule by mouth Daily. 90 capsule 2 Taking    carvedilol (COREG) 25 MG tablet Take 1 tablet by mouth 2 (Two) Times a Day With Meals.   Taking    cholestyramine light 4 g packet Take 1 packet by mouth Daily. 90 packet 1 Taking    cloNIDine (CATAPRES) 0.3 MG tablet Take 1 tablet by mouth 3 times a day.   Taking    clopidogrel (PLAVIX) 75 MG tablet Take 1 tablet by mouth Daily.   Taking    Copper Gluconate (Copper Caps) 2 MG capsule Take 2 mg by mouth Daily.   Taking    empagliflozin (Jardiance) 10 MG tablet tablet Take 1 tablet by mouth Daily.   Taking    fexofenadine (ALLEGRA) 180 MG tablet Take 1 tablet by mouth Daily.   Taking    furosemide (LASIX) 40 MG tablet Take 1 tablet by mouth Daily. 90 tablet 2 Taking    hydrALAZINE (APRESOLINE) 100 MG tablet Take 1 tablet by mouth 3 (Three) Times a Day. 100mg daily   Taking    isosorbide dinitrate (ISORDIL) 10 MG tablet Take 1 tablet by mouth 3 (Three) Times a Day. 270 tablet 2 Taking    levothyroxine (Synthroid) 100 MCG tablet Take 1 tablet by mouth Every Morning. 90 tablet 2 Taking    magnesium chloride ER 64 MG DR tablet Take 1 tablet by mouth Daily.   Taking    Melatonin 10 MG tablet Take 1 tablet by mouth Every Night.   Taking    mesalamine (APRISO) 0.375 g 24 hr capsule Take 4 capsules by mouth Daily. 360 capsule 1 Taking    Multiple Vitamins-Minerals (PRESERVISION/LUTEIN) capsule Take 1 capsule by mouth 2 (two) times a day.   Taking    NIFEdipine XL (PROCARDIA XL) 60 MG 24 hr tablet Take 1 tablet by mouth Daily.   Taking    omeprazole (priLOSEC) 20 MG capsule Take 1  capsule by mouth Daily.   Taking    sodium bicarbonate 650 MG tablet Take 1 tablet by mouth 5 (Five) Times a Day.   Taking    spironolactone (ALDACTONE) 25 MG tablet Take 1 tablet by mouth Daily.   Taking    tamsulosin (FLOMAX) 0.4 MG capsule 24 hr capsule Take 1 capsule by mouth Every Night.   Taking    valsartan (DIOVAN) 320 MG tablet Take 1 tablet by mouth Daily.   Taking    vitamin D (ERGOCALCIFEROL) 1.25 MG (89099 UT) capsule capsule Take 1 capsule by mouth 1 (One) Time Per Week. Mondays   Taking    albuterol sulfate  (90 Base) MCG/ACT inhaler Inhale 2 puffs Every 6 (Six) Hours As Needed for Wheezing or Shortness of Air.       aspirin-acetaminophen-caffeine (EXCEDRIN MIGRAINE) 250-250-65 MG per tablet Take 1 tablet by mouth Every 6 (Six) Hours As Needed for Headache. (Patient taking differently: Take 3 tablets by mouth Every 6 (Six) Hours As Needed for Headache. Patient reports he takes 6-12 every day.)       Budeson-Glycopyrrol-Formoterol (Breztri Aerosphere) 160-9-4.8 MCG/ACT aerosol inhaler Inhale 2 puffs 2 (Two) Times a Day.       desoximetasone (TOPICORT) 0.25 % cream Apply 1 Application topically to the appropriate area as directed 2 (Two) Times a Day As Needed for Irritation. irritation       diphenhydrAMINE HCl, Sleep, 50 MG/30ML liquid Take 15 mL by mouth At Night As Needed (insomnia).       docusate sodium (COLACE) 100 MG capsule Take 1 capsule by mouth 3 (Three) Times a Day As Needed for Constipation.       fluticasone (FLONASE) 50 MCG/ACT nasal spray Administer 2 sprays into the nostril(s) as directed by provider Daily As Needed for Allergies.       montelukast (SINGULAIR) 10 MG tablet TAKE 1 TABLET EVERY NIGHT 90 tablet 3        Medicines:  Current Facility-Administered Medications   Medication Dose Route Frequency Provider Last Rate Last Admin    ALPRAZolam (XANAX) tablet 0.25 mg  0.25 mg Oral Nightly PRN BickingGil DO   0.25 mg at 06/01/25 2115    ampicillin-sulbactam (UNASYN) 3 g  in sodium chloride 0.9 % 100 mL MBP  3 g Intravenous Q12H Dinesh Camarena MD   3 g at 06/02/25 0357    aspirin EC tablet 81 mg  81 mg Oral Daily Gil Pineda DO   81 mg at 06/01/25 1112    atorvastatin (LIPITOR) tablet 10 mg  10 mg Oral Daily BickingGil, DO   10 mg at 06/01/25 1114    sennosides-docusate (PERICOLACE) 8.6-50 MG per tablet 2 tablet  2 tablet Oral BID PRN BickingGil DO   2 tablet at 05/31/25 2011    And    polyethylene glycol (MIRALAX) packet 17 g  17 g Oral Daily PRN Gil Pineda, DO        And    bisacodyl (DULCOLAX) EC tablet 5 mg  5 mg Oral Daily PRN BickingGil, DO   5 mg at 05/28/25 0634    And    bisacodyl (DULCOLAX) suppository 10 mg  10 mg Rectal Daily PRN Gil Pineda DO        calcitriol (ROCALTROL) capsule 0.5 mcg  0.5 mcg Oral Daily Bicking, Gil FUCHS, DO   0.5 mcg at 06/01/25 1111    carvedilol (COREG) tablet 25 mg  25 mg Oral BID With Meals Gil Pineda DO   25 mg at 06/01/25 1814    cloNIDine (CATAPRES) tablet 0.3 mg  0.3 mg Oral TID BicGil donald DO   0.3 mg at 06/01/25 1640    clopidogrel (PLAVIX) tablet 75 mg  75 mg Oral Daily BickingGil, DO   75 mg at 06/01/25 1113    cyclobenzaprine (FLEXERIL) tablet 5 mg  5 mg Oral TID PRN BicGil donald DO   5 mg at 06/01/25 2115    epoetin cecile-epbx (RETACRIT) 5,000 Units 2 mL injection  5,000 Units Subcutaneous Once per day on Monday Wednesday Friday Gil Pineda DO   5,000 Units at 05/26/25 0925    gabapentin (NEURONTIN) capsule 300 mg  300 mg Oral Nightly BicGil donald DO   300 mg at 06/01/25 2115    heparin (porcine) injection 3,200 Units  3,200 Units Intracatheter PRN BicGil donald DO   3,200 Units at 05/30/25 1147    heparin (porcine) injection 3,600 Units  3,600 Units Intravenous Once Gil Pineda DO        hydrALAZINE (APRESOLINE) injection 10 mg  10 mg Intravenous Q6H PRN BicGil donald DO   10 mg at 05/22/25 0654    [Held by  provider] hydrALAZINE (APRESOLINE) tablet 50 mg  50 mg Oral TID Garrett Comer MD        ipratropium-albuterol (DUO-NEB) nebulizer solution 3 mL  3 mL Nebulization TID PRN Gil Pineda DO        iron polysaccharides (NIFEREX) capsule 150 mg  150 mg Oral Daily Gil Pineda DO   150 mg at 06/01/25 1114    isosorbide dinitrate (ISORDIL) tablet 10 mg  10 mg Oral TID - Nitrates Gil Pineda DO   10 mg at 06/01/25 1813    levothyroxine (SYNTHROID, LEVOTHROID) tablet 100 mcg  100 mcg Oral Q AM Gil Pineda DO   100 mcg at 06/02/25 0537    montelukast (SINGULAIR) tablet 10 mg  10 mg Oral Nightly Gil Pineda DO   10 mg at 06/01/25 2115    NIFEdipine XL (PROCARDIA XL) 24 hr tablet 90 mg  90 mg Oral Daily Gil Pineda DO   90 mg at 06/01/25 1113    nitroglycerin (NITROSTAT) SL tablet 0.4 mg  0.4 mg Sublingual Q5 Min PRN Gil Pineda DO        oxyCODONE-acetaminophen (PERCOCET) 5-325 MG per tablet 1 tablet  1 tablet Oral Q4H PRN Gil Pineda DO   1 tablet at 06/01/25 1814    pantoprazole (PROTONIX) EC tablet 40 mg  40 mg Oral Q AM Gil Pineda DO   40 mg at 06/02/25 0537    prochlorperazine (COMPAZINE) injection 5 mg  5 mg Intravenous Q6H PRN Gil Pineda DO   5 mg at 05/28/25 1752    Or    prochlorperazine (COMPAZINE) tablet 5 mg  5 mg Oral Q6H PRN Gil Pineda DO        Or    prochlorperazine (COMPAZINE) suppository 25 mg  25 mg Rectal Q12H PRN Gil Pineda DO        promethazine (PHENERGAN) 12.5 mg in sodium chloride 0.9 % 50 mL  12.5 mg Intravenous Q6H PRN Gil Pineda DO        sodium chloride 0.9 % flush 10 mL  10 mL Intravenous PRN Bicking, Gil K, DO        sodium chloride 0.9 % flush 10 mL  10 mL Intravenous Q12H Gil Pineda DO   10 mL at 06/01/25 2115    sodium chloride 0.9 % flush 10 mL  10 mL Intravenous PRN Gil Pineda,         sodium chloride 0.9 % infusion 40 mL  40 mL Intravenous PRN Gil Pineda  K, DO        spironolactone (ALDACTONE) tablet 25 mg  25 mg Oral Daily BickingPeteyin K, DO   25 mg at 06/01/25 1109    tamsulosin (FLOMAX) 24 hr capsule 0.4 mg  0.4 mg Oral Nightly Bicking Gil FUCHS DO   0.4 mg at 06/01/25 2115    valsartan (DIOVAN) tablet 320 mg  320 mg Oral Daily Bicking Gil FUCHS, DO   320 mg at 06/01/25 1111       Past Medical History:  Past Medical History:   Diagnosis Date    3-vessel CAD 08/11/2020    Allergic rhinitis     Anemia     Anxiety disorder 04/27/2020    Arthritis     Asymmetrical sensorineural hearing loss 06/28/2017    Atherosclerosis of native artery of both lower extremities with intermittent claudication 07/18/2019    Avascular necrosis of femoral head, left 07/11/2020    right hip after surgery    Carotid stenosis     Chronic mucoid otitis media     Chronic rhinitis     COPD (chronic obstructive pulmonary disease)     Coronary artery disease     HEART BYPASS 2004    Crohn's disease of large intestine with other complication 07/30/2020    Chronic diarrhea Colonoscopy July 2020 revealed mild patchy scattered hemosiderin staining with inflammation more so in rectosigmoid area.  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    Iron deficiency anemia 07/14/2020    Mixed hearing loss of left ear     PAD (peripheral artery disease) 08/11/2020    Perianal abscess     Pernicious anemia 08/17/2020    took shots but never diagnosed with b12 deficiency    Personal history of alcoholism 08/11/2020    quit drinking in 2013    Prostatic hypertrophy 08/11/2020    Sensorineural hearing loss     Sepsis with acute renal failure 09/15/2020     Shortness of breath 05/27/2021    Tinnitus     Ventricular tachycardia, nonsustained 07/14/2020    Weight loss 07/11/2020       Past Surgical History:  Past Surgical History:   Procedure Laterality Date    AORTOGRAM Right 4/25/2025    Procedure: RIGHT LOWER EXTREMITY ANGIOGRAM, SHOCKWAVE LITHOTRIPSY, BALLOON ANGIOPLASTY, MYNX CLOSURE;  Surgeon: Gil Pineda DO;  Location: [facility] HYBRID OR;  Service: Vascular;  Laterality: Right;    AORTOGRAM Left 5/22/2025    Procedure: LEFT LOWER EXTREMITY ANGIOGRAM, SHOCKWAVE LITHOTRIPSY, BALLOON ANGIOPLASTY, STENT PLACEMENT, MYNX CLOSURE;  Surgeon: Blayne Zabala MD;  Location: [facility] HYBRID OR;  Service: Vascular;  Laterality: Left;    AORTOGRAM Right 5/30/2025    Procedure: AORTOILIAC ANGIOGRAM WITH LEFT LOWER EXTREMITY ANGIOGRAM;  Surgeon: Gil Pineda DO;  Location: [facility] HYBRID OR;  Service: Vascular;  Laterality: Right;    ARTERY SURGERY  2021    right side on neck    CAROTID ENDARTERECTOMY Right 05/10/2021    Procedure: RIGHT CAROTID ENDARTERECTOMY WITH EEG;  Surgeon: Gil Pineda DO;  Location: [facility] HYBRID OR 12;  Service: Vascular;  Laterality: Right;    COLONOSCOPY N/A 07/02/2020    Procedure: COLONOSCOPY WITH ANESTHESIA;  Surgeon: Adrien Brewster MD;  Location: [facility] ENDOSCOPY;  Service: Gastroenterology;  Laterality: N/A;  pre op: diarrhea  post op: polyps  PCP: Joe Velasco MD    COLONOSCOPY N/A 10/13/2020    Procedure: COLONOSCOPY WITH ANESTHESIA;  Surgeon: Adrien Brewster MD;  Location: [facility] ENDOSCOPY;  Service: Gastroenterology;  Laterality: N/A;  Pre: Chronic Diarrhea, Crohn's  Post: AVM  Dr. Neftali Velasco  CO2 Inflation Used    COLONOSCOPY N/A 12/08/2023    Procedure: COLONOSCOPY WITH ANESTHESIA;  Surgeon: Adrien Brewster MD;  Location: [facility] ENDOSCOPY;  Service: Gastroenterology;  Laterality: N/A;  pre chrone's disease  post sub optimal prep, polyp, chrone's      CORONARY ARTERY BYPASS GRAFT  2003    x3     ENDOSCOPY N/A 2021    Procedure: ESOPHAGOGASTRODUODENOSCOPY WITH ANESTHESIA;  Surgeon: Bridger Bell MD;  Location: [facility] ENDOSCOPY;  Service: Gastroenterology;  Laterality: N/A;  pre anemia;gi bleed  post  gi bleed;schatski ring  Dr. ERIC Velasco    ENDOSCOPY N/A 10/10/2023    Procedure: ESOPHAGOGASTRODUODENOSCOPY WITH ANESTHESIA;  Surgeon: Adrien Brewster MD;  Location: [facility] ENDOSCOPY;  Service: Gastroenterology;  Laterality: N/A;  preop; anemia  postop esophagitis ; R/O barretts   PCP Randall Beata    EYE SURGERY Bilateral     catorac    INCISION AND DRAINAGE PERIRECTAL ABSCESS N/A 2017    Procedure: INCISION AND DRAINAGE OF JEET ANAL ABSCESS;  Surgeon: Lynette Smith MD;  Location: [facility] OR;  Service:     INGUINAL HERNIA REPAIR Bilateral 2023    Procedure: INGUINAL HERNIA BILATERAL REPAIR LAPAROSCOPIC WITH DAVINCI ROBOT WITH MESH;  Surgeon: Tahiar Rivera MD;  Location: [facility] OR;  Service: Robotics - DaVinci;  Laterality: Bilateral;    INSERTION HEMODIALYSIS CATHETER N/A 2025    Procedure: HEMODIALYSIS CATHETER PLACEMENT;  Surgeon: Gil Pineda DO;  Location: [facility] HYBRID OR;  Service: Vascular;  Laterality: N/A;    MYRINGOTOMY W/ TUBES Left 2017    06/10/2016    TONSILLECTOMY      TOTAL HIP ARTHROPLASTY Right        Family History  Family History   Problem Relation Age of Onset    Breast cancer Mother     Dementia Father     Glaucoma Father     No Known Problems Daughter     Colon polyps Neg Hx     Colon cancer Neg Hx        Social History  Social History     Socioeconomic History    Marital status:    Tobacco Use    Smoking status: Former     Current packs/day: 0.00     Average packs/day: 0.5 packs/day for 25.8 years (12.9 ttl pk-yrs)     Types: Cigarettes     Start date:      Quit date: 10/13/2013     Years since quittin.6     Passive exposure: Past    Smokeless tobacco: Never    Tobacco comments:     quit 2013   Vaping Use    Vaping  status: Former    Substances: Nicotine    Devices: Pre-filled or refillable cartridge   Substance and Sexual Activity    Alcohol use: Not Currently    Drug use: No    Sexual activity: Not Currently     Partners: Female       Review of Systems:  History obtained from chart review and the patient  General ROS: No fever or chills  Respiratory ROS: No cough, shortness of breath, wheezing  Cardiovascular ROS: No chest pain or palpitations  Gastrointestinal ROS: No abdominal pain or melena  Genito-Urinary ROS: No dysuria or hematuria  Psych ROS: No anxiety and depression  14 point ROS reviewed with the patient and negative except as noted above and in the HPI unless unable to obtain.    Objective:  Patient Vitals for the past 24 hrs:   BP Temp Temp src Pulse Resp SpO2   06/02/25 0740 113/49 97.9 °F (36.6 °C) Oral 72 16 93 %   06/02/25 0448 135/88 98.1 °F (36.7 °C) Oral 74 16 97 %   06/01/25 1946 101/45 98.4 °F (36.9 °C) Oral 62 16 93 %   06/01/25 1627 122/58 98.8 °F (37.1 °C) Oral 84 16 94 %   06/01/25 1158 130/50 98.8 °F (37.1 °C) Oral 81 16 93 %       Intake/Output Summary (Last 24 hours) at 6/2/2025 1050  Last data filed at 6/1/2025 1627  Gross per 24 hour   Intake --   Output 400 ml   Net -400 ml     General: awake/alert   Chest:  clear to auscultation bilaterally without respiratory distress  CVS: regular rate and rhythm  Abdominal: soft, nontender, positive bowel sounds  Extremities: no cyanosis or edema  Skin: warm and dry without rash      Labs:  Results from last 7 days   Lab Units 06/02/25  0643 06/01/25  0304 05/31/25  0431   WBC 10*3/mm3 8.08 9.21 7.12   HEMOGLOBIN g/dL 7.4* 7.6* 7.4*   HEMATOCRIT % 23.1* 24.2* 22.3*   PLATELETS 10*3/mm3 127* 120* 112*         Results from last 7 days   Lab Units 06/02/25  0643 06/01/25  0304 05/31/25  0431   SODIUM mmol/L 136 137 137   POTASSIUM mmol/L 4.6 4.5 4.1   CHLORIDE mmol/L 103 101 102   CO2 mmol/L 22.0 20.0* 23.0   BUN mg/dL 53.6* 41.9* 27.3*   CREATININE mg/dL 4.48*  "3.68* 2.69*   CALCIUM mg/dL 8.5* 8.5* 7.9*   EGFR mL/min/1.73 12.8* 16.2* 23.6*   GLUCOSE mg/dL 104* 98 97       Radiology:   Imaging Results (Last 72 Hours)       Procedure Component Value Units Date/Time    IR Angiogram Extremity - Preliminary [[id number]] Collected: 06/02/25 0651     Updated: 06/02/25 0652    This result has not been signed. Information might be incomplete.      Narrative:      Performed by Dr. Pineda. Please see procedure note.       FL C Arm During Surgery [[id number]] Resulted: 05/30/25 1615     Updated: 05/30/25 1615    Narrative:      This procedure was auto-finalized with no dictation required.            Culture:  No results found for: \"BLOODCX\", \"URINECX\", \"WOUNDCX\", \"MRSACX\", \"RESPCX\", \"STOOLCX\"      Assessment    End stage renal disease on HD MWF  Hypertension  Anemia of CKD  Peripheral vascular disease--s/p angiogram on 5/22 and multiple balloon angioplasties in the left lower extremity on 5/30   Thrombocytopenia  Hypokalemia--improved  Hyponatremia--improved    Plan:   Dialysis today  Monitor labs      Slava Tate, STERLING  6/2/2025  10:50 CDT    "

## 2025-06-02 NOTE — PLAN OF CARE
Goal Outcome Evaluation:  Plan of Care Reviewed With: patient        Progress: improving  Outcome Evaluation: Pt went to dialysis today with 1850cc removed. alert and oriented. Pain med given twice this shift. family at bedside and safety maintained

## 2025-06-02 NOTE — PROGRESS NOTES
"    [facility] Medicine Services  INPATIENT PROGRESS NOTE    Patient Name: Ricardo Hugo  Date of Admission: 5/21/2025  Today's Date: 06/02/25  Length of Stay: 12  Primary Care Physician: Nathan Zimmer MD    Subjective   Chief Complaint: follow-up LLE pain  HPI   I saw patient while he was on dialysis this morning.  He reports that the pain in his left lower extremity is much improved.  When I asked him how his foot/leg are when he walks, he indicated \"I haven't walked.\"  He is agreeable to working with therapy.  He's also agreeable to placement at [facility] for rehabilitation.    Review of Systems   All pertinent negatives and positives are as above. All other systems have been reviewed and are negative unless otherwise stated.     Objective    Temp:  [97.9 °F (36.6 °C)-98.8 °F (37.1 °C)] 97.9 °F (36.6 °C)  Heart Rate:  [62-84] 72  Resp:  [16] 16  BP: (101-135)/(45-88) 113/49  Physical Exam  Vitals reviewed.   Constitutional:       General: He is not in acute distress.     Comments: Currently on dialysis   HENT:      Head: Normocephalic.      Mouth/Throat:      Mouth: Mucous membranes are moist.   Pulmonary:      Effort: Pulmonary effort is normal. No respiratory distress.      Comments: On room air; no prominent rhonchi or rales  Skin:     General: Skin is warm.      Comments: Still some tenderness and sensitivity with palpation to distal LLE, but overall markedly improved.  Much more warm and better perfused as compared to exam a little over a week ago   Neurological:      Mental Status: He is alert.      Motor: Weakness present.   Psychiatric:         Mood and Affect: Mood normal.         Results Review:  I have reviewed the labs, radiology results, and diagnostic studies.    Laboratory Data:   Results from last 7 days   Lab Units 06/02/25  0643 06/01/25  0304 05/31/25  0431   WBC 10*3/mm3 8.08 9.21 7.12   HEMOGLOBIN g/dL 7.4* 7.6* 7.4*   HEMATOCRIT % 23.1* 24.2* " "22.3*   PLATELETS 10*3/mm3 127* 120* 112*        Results from last 7 days   Lab Units 06/02/25  0643 06/01/25  0304 05/31/25  0431   SODIUM mmol/L 136 137 137   POTASSIUM mmol/L 4.6 4.5 4.1   CHLORIDE mmol/L 103 101 102   CO2 mmol/L 22.0 20.0* 23.0   BUN mg/dL 53.6* 41.9* 27.3*   CREATININE mg/dL 4.48* 3.68* 2.69*   CALCIUM mg/dL 8.5* 8.5* 7.9*   GLUCOSE mg/dL 104* 98 97       Culture Data:   No results found for: \"BLOODCX\", \"URINECX\", \"WOUNDCX\", \"MRSACX\", \"RESPCX\", \"STOOLCX\"    Radiology Data:   Imaging Results (Last 24 Hours)       Procedure Component Value Units Date/Time    IR Angiogram Extremity - Preliminary [[id number]] Collected: 06/02/25 0651     Updated: 06/02/25 0652    This result has not been signed. Information might be incomplete.      Narrative:      Performed by Dr. Pineda. Please see procedure note.               I have reviewed the patient's current medications.     Assessment/Plan   Assessment  Active Hospital Problems    Diagnosis     **Arterial occlusion     Thrombocytopenia     Acute pain of left lower extremity     ESRD (end stage renal disease) on dialysis     Abnormal TSH     Chronic diastolic (congestive) heart failure     Anticoagulated     Sensorineural hearing loss (SNHL), bilateral     Eustachian tube dysfunction, bilateral     Nasal septal deviation     Mixed hyperlipidemia     CLL (chronic lymphocytic leukemia)     Anemia due to chronic kidney disease     Diastolic dysfunction     Symptomatic anemia     Resistant hypertension     Peripheral arterial disease     IHD (ischemic heart disease)     Essential hypertension     Chronic rhinitis        Treatment Plan  I saw patient during dialysis today  Most recent Vascular intervention performed by Dr. Pineda on 5/30/2025  Hgb stable at 7.4 this morning  Patient status post transfusion of packed red blood cells.  Continue Retacrit  Continue PO Niferex  Currently on IV Zosyn and transitioned to IV Unasyn - day 5 of 5 today  Patient remains " on room air  Physical therapy consutlation  Holding hydralazine; monitor BP trend  Continue ASA/Plavix/statin  Dispo planning: looking into placement at [facility]      Medical Decision Making  Number and Complexity of problems: Moderate complexity      Conditions and Status        Condition is improving.     [facility] Data  External documents reviewed: none  Cardiac tracing (EKG, telemetry) interpretation: no new EKGs  Radiology interpretation: no new radiology studies  Labs reviewed: as above  Any tests that were considered but not ordered: none     Decision rules/scores evaluated (example [id number], Wells, etc): none     Discussed with: patient     Care Planning  Shared decision making: discussed with patient with agreement to proceed with treatment plan as outlined  Code status and discussions: Full Code    Disposition  Social Determinants of Health that impact treatment or disposition: none apparent at this time  I expect the patient to be discharged to hopefully [facility] when bed available and insurance approved.        Electronically signed by Dinesh Camarena MD, 06/02/25, 11:53 CDT.

## 2025-06-03 LAB
ABO GROUP BLD: NORMAL
ANION GAP SERPL CALCULATED.3IONS-SCNC: 11 MMOL/L (ref 5–15)
BLD GP AB SCN SERPL QL: NEGATIVE
BUN SERPL-MCNC: 24.2 MG/DL (ref 8–23)
BUN/CREAT SERPL: 8.7 (ref 7–25)
CALCIUM SPEC-SCNC: 8.2 MG/DL (ref 8.6–10.5)
CHLORIDE SERPL-SCNC: 99 MMOL/L (ref 98–107)
CO2 SERPL-SCNC: 27 MMOL/L (ref 22–29)
CREAT SERPL-MCNC: 2.79 MG/DL (ref 0.76–1.27)
DEPRECATED RDW RBC AUTO: 64.5 FL (ref 37–54)
EGFRCR SERPLBLD CKD-EPI 2021: 22.6 ML/MIN/1.73
ERYTHROCYTE [DISTWIDTH] IN BLOOD BY AUTOMATED COUNT: 17.9 % (ref 12.3–15.4)
GLUCOSE SERPL-MCNC: 109 MG/DL (ref 65–99)
HCT VFR BLD AUTO: 22.1 % (ref 37.5–51)
HGB BLD-MCNC: 7 G/DL (ref 13–17.7)
MCH RBC QN AUTO: 31.7 PG (ref 26.6–33)
MCHC RBC AUTO-ENTMCNC: 31.7 G/DL (ref 31.5–35.7)
MCV RBC AUTO: 100 FL (ref 79–97)
PLATELET # BLD AUTO: 120 10*3/MM3 (ref 140–450)
PMV BLD AUTO: 9.8 FL (ref 6–12)
POTASSIUM SERPL-SCNC: 4.2 MMOL/L (ref 3.5–5.2)
RBC # BLD AUTO: 2.21 10*6/MM3 (ref 4.14–5.8)
RH BLD: NEGATIVE
SODIUM SERPL-SCNC: 137 MMOL/L (ref 136–145)
T&S EXPIRATION DATE: NORMAL
WBC NRBC COR # BLD AUTO: 6.8 10*3/MM3 (ref 3.4–10.8)

## 2025-06-03 PROCEDURE — P9016 RBC LEUKOCYTES REDUCED: HCPCS

## 2025-06-03 PROCEDURE — 80048 BASIC METABOLIC PNL TOTAL CA: CPT | Performed by: SURGERY

## 2025-06-03 PROCEDURE — 86901 BLOOD TYPING SEROLOGIC RH(D): CPT | Performed by: FAMILY MEDICINE

## 2025-06-03 PROCEDURE — 86923 COMPATIBILITY TEST ELECTRIC: CPT

## 2025-06-03 PROCEDURE — 85027 COMPLETE CBC AUTOMATED: CPT | Performed by: SURGERY

## 2025-06-03 PROCEDURE — 86900 BLOOD TYPING SEROLOGIC ABO: CPT

## 2025-06-03 PROCEDURE — 99232 SBSQ HOSP IP/OBS MODERATE 35: CPT | Performed by: NURSE PRACTITIONER

## 2025-06-03 PROCEDURE — 86850 RBC ANTIBODY SCREEN: CPT | Performed by: FAMILY MEDICINE

## 2025-06-03 PROCEDURE — 36430 TRANSFUSION BLD/BLD COMPNT: CPT

## 2025-06-03 PROCEDURE — 97162 PT EVAL MOD COMPLEX 30 MIN: CPT | Performed by: PHYSICAL THERAPIST

## 2025-06-03 PROCEDURE — 86900 BLOOD TYPING SEROLOGIC ABO: CPT | Performed by: FAMILY MEDICINE

## 2025-06-03 RX ORDER — OXYCODONE AND ACETAMINOPHEN 5; 325 MG/1; MG/1
1 TABLET ORAL EVERY 6 HOURS PRN
Refills: 0 | Status: DISCONTINUED | OUTPATIENT
Start: 2025-06-03 | End: 2025-06-05 | Stop reason: HOSPADM

## 2025-06-03 RX ORDER — SPIRONOLACTONE 25 MG/1
25 TABLET ORAL DAILY
Qty: 90 TABLET | Refills: 3 | OUTPATIENT
Start: 2025-06-03

## 2025-06-03 RX ORDER — CARVEDILOL 6.25 MG/1
12.5 TABLET ORAL 2 TIMES DAILY WITH MEALS
Status: DISCONTINUED | OUTPATIENT
Start: 2025-06-03 | End: 2025-06-05 | Stop reason: HOSPADM

## 2025-06-03 RX ADMIN — Medication 10 ML: at 08:43

## 2025-06-03 RX ADMIN — TAMSULOSIN HYDROCHLORIDE 0.4 MG: 0.4 CAPSULE ORAL at 20:53

## 2025-06-03 RX ADMIN — ISOSORBIDE DINITRATE 10 MG: 10 TABLET ORAL at 17:18

## 2025-06-03 RX ADMIN — CLONIDINE HYDROCHLORIDE 0.3 MG: 0.1 TABLET ORAL at 08:43

## 2025-06-03 RX ADMIN — CALCITRIOL CAPSULES 0.25 MCG 0.5 MCG: 0.25 CAPSULE ORAL at 08:41

## 2025-06-03 RX ADMIN — ATORVASTATIN CALCIUM 10 MG: 10 TABLET, FILM COATED ORAL at 08:42

## 2025-06-03 RX ADMIN — CLOPIDOGREL BISULFATE 75 MG: 75 TABLET, FILM COATED ORAL at 08:42

## 2025-06-03 RX ADMIN — NIFEDIPINE 90 MG: 60 TABLET, FILM COATED, EXTENDED RELEASE ORAL at 08:42

## 2025-06-03 RX ADMIN — OXYCODONE HYDROCHLORIDE AND ACETAMINOPHEN 1 TABLET: 5; 325 TABLET ORAL at 20:51

## 2025-06-03 RX ADMIN — CYCLOBENZAPRINE 5 MG: 10 TABLET, FILM COATED ORAL at 04:23

## 2025-06-03 RX ADMIN — ASPIRIN 81 MG: 81 TABLET, COATED ORAL at 08:42

## 2025-06-03 RX ADMIN — GABAPENTIN 300 MG: 300 CAPSULE ORAL at 20:53

## 2025-06-03 RX ADMIN — CARVEDILOL 12.5 MG: 6.25 TABLET, FILM COATED ORAL at 17:18

## 2025-06-03 RX ADMIN — SPIRONOLACTONE 25 MG: 25 TABLET ORAL at 08:41

## 2025-06-03 RX ADMIN — ISOSORBIDE DINITRATE 10 MG: 10 TABLET ORAL at 12:55

## 2025-06-03 RX ADMIN — MONTELUKAST SODIUM 10 MG: 10 TABLET, COATED ORAL at 20:53

## 2025-06-03 RX ADMIN — VALSARTAN 320 MG: 80 TABLET, FILM COATED ORAL at 08:42

## 2025-06-03 RX ADMIN — OXYCODONE HYDROCHLORIDE AND ACETAMINOPHEN 1 TABLET: 5; 325 TABLET ORAL at 12:54

## 2025-06-03 RX ADMIN — ISOSORBIDE DINITRATE 10 MG: 10 TABLET ORAL at 08:41

## 2025-06-03 RX ADMIN — OXYCODONE HYDROCHLORIDE AND ACETAMINOPHEN 1 TABLET: 5; 325 TABLET ORAL at 08:42

## 2025-06-03 RX ADMIN — Medication 150 MG: at 08:43

## 2025-06-03 RX ADMIN — PANTOPRAZOLE SODIUM 40 MG: 40 TABLET, DELAYED RELEASE ORAL at 05:30

## 2025-06-03 RX ADMIN — OXYCODONE HYDROCHLORIDE AND ACETAMINOPHEN 1 TABLET: 5; 325 TABLET ORAL at 04:23

## 2025-06-03 RX ADMIN — LEVOTHYROXINE SODIUM 100 MCG: 0.1 TABLET ORAL at 05:30

## 2025-06-03 RX ADMIN — CARVEDILOL 25 MG: 25 TABLET, FILM COATED ORAL at 08:42

## 2025-06-03 NOTE — THERAPY EVALUATION
Patient Name: Ricardo Hugo  : 1950    MRN: [id number]                              Today's Date: 6/3/2025       Admit Date: 2025    Visit Dx:     ICD-10-CM ICD-9-CM   1. Arterial occlusion  I70.90 444.9   2. Dialysis patient  Z99.2 V45.11     Patient Active Problem List   Diagnosis    Chronic rhinitis    Essential hypertension    Resistant hypertension    Chronic diarrhea    Symptomatic anemia    Wellness examination    Peripheral arterial disease    IHD (ischemic heart disease)    Carotid stenosis    Stenosis of right carotid artery    Carotid stenosis, right    Diastolic dysfunction    CKD (chronic kidney disease) stage 5    Anemia due to chronic kidney disease    Copper deficiency    Low iron     CLL (chronic lymphocytic leukemia)    Perforation of left tympanic membrane    Mixed hyperlipidemia    Systolic murmur    Eustachian tube dysfunction, bilateral    Sensorineural hearing loss (SNHL), bilateral    Anticoagulated    Nasal septal deviation    Hypertrophy of inferior nasal turbinate    Unilateral recurrent inguinal hernia without obstruction or gangrene    Bilateral inguinal hernia without obstruction or gangrene    Sleep difficulties    Dermatitis associated with moisture    Proteinuria    Chronic diastolic (congestive) heart failure    Bradycardia    History of pneumonia, current treatment with cefdinir    Abnormal TSH    ESRD (end stage renal disease) on dialysis    Preop testing    Anemia, multifactorial to include chronic kidney disease, iron deficiency and possible bleed    Acute pain of left lower extremity    Hyperkalemia    Arterial occlusion    Thrombocytopenia     Past Medical History:   Diagnosis Date    3-vessel CAD 2020    Allergic rhinitis     Anemia     Anxiety disorder 2020    Arthritis     Asymmetrical sensorineural hearing loss 2017    Atherosclerosis of native artery of both lower extremities with intermittent claudication 2019    Avascular necrosis  of femoral head, left 07/11/2020    right hip after surgery    Carotid stenosis     Chronic mucoid otitis media     Chronic rhinitis     COPD (chronic obstructive pulmonary disease)     Coronary artery disease     HEART BYPASS 2004    Crohn's disease of large intestine with other complication 07/30/2020    Chronic diarrhea Colonoscopy July 2020 revealed mild patchy scattered hemosiderin staining with inflammation more so in rectosigmoid area.  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    Iron deficiency anemia 07/14/2020    Mixed hearing loss of left ear     PAD (peripheral artery disease) 08/11/2020    Perianal abscess     Pernicious anemia 08/17/2020    took shots but never diagnosed with b12 deficiency    Personal history of alcoholism 08/11/2020    quit drinking in 2013    Prostatic hypertrophy 08/11/2020    Sensorineural hearing loss     Sepsis with acute renal failure 09/15/2020    Shortness of breath 05/27/2021    Tinnitus     Ventricular tachycardia, nonsustained 07/14/2020    Weight loss 07/11/2020     Past Surgical History:   Procedure Laterality Date    AORTOGRAM Right 4/25/2025    Procedure: RIGHT LOWER EXTREMITY ANGIOGRAM, SHOCKWAVE LITHOTRIPSY, BALLOON ANGIOPLASTY, MYNX CLOSURE;  Surgeon: Gil Pineda DO;  Location: [facility] HYBRID OR;  Service: Vascular;  Laterality: Right;    AORTOGRAM Left 5/22/2025    Procedure: LEFT LOWER EXTREMITY ANGIOGRAM, SHOCKWAVE LITHOTRIPSY, BALLOON ANGIOPLASTY, STENT PLACEMENT, MYNX CLOSURE;  Surgeon: Blayne Zabala MD;  Location: [facility] HYBRID OR;  Service: Vascular;  Laterality: Left;    AORTOGRAM Right  5/30/2025    Procedure: AORTOILIAC ANGIOGRAM WITH LEFT LOWER EXTREMITY ANGIOGRAM;  Surgeon: Gil Pineda DO;  Location: [facility] HYBRID OR;  Service: Vascular;  Laterality: Right;    ARTERY SURGERY  2021    right side on neck    CAROTID ENDARTERECTOMY Right 05/10/2021    Procedure: RIGHT CAROTID ENDARTERECTOMY WITH EEG;  Surgeon: Gil Pineda DO;  Location: [facility] HYBRID OR 12;  Service: Vascular;  Laterality: Right;    COLONOSCOPY N/A 07/02/2020    Procedure: COLONOSCOPY WITH ANESTHESIA;  Surgeon: Adrien Brewster MD;  Location: [facility] ENDOSCOPY;  Service: Gastroenterology;  Laterality: N/A;  pre op: diarrhea  post op: polyps  PCP: Joe Velasco MD    COLONOSCOPY N/A 10/13/2020    Procedure: COLONOSCOPY WITH ANESTHESIA;  Surgeon: Adrien Brewster MD;  Location: [facility] ENDOSCOPY;  Service: Gastroenterology;  Laterality: N/A;  Pre: Chronic Diarrhea, Crohn's  Post: AVM  Dr. Neftali Velasco  CO2 Inflation Used    COLONOSCOPY N/A 12/08/2023    Procedure: COLONOSCOPY WITH ANESTHESIA;  Surgeon: Adrien Brewster MD;  Location: [facility] ENDOSCOPY;  Service: Gastroenterology;  Laterality: N/A;  pre chrone's disease  post sub optimal prep, polyp, chrone's      CORONARY ARTERY BYPASS GRAFT  2003    x3    ENDOSCOPY N/A 11/02/2021    Procedure: ESOPHAGOGASTRODUODENOSCOPY WITH ANESTHESIA;  Surgeon: Bridger Bell MD;  Location: [facility] ENDOSCOPY;  Service: Gastroenterology;  Laterality: N/A;  pre anemia;gi bleed  post  gi bleed;fabian ring  Dr. ERIC Velasco    ENDOSCOPY N/A 10/10/2023    Procedure: ESOPHAGOGASTRODUODENOSCOPY WITH ANESTHESIA;  Surgeon: Adrien Brewster MD;  Location: [facility] ENDOSCOPY;  Service: Gastroenterology;  Laterality: N/A;  preop; anemia  postop esophagitis ; R/O barretts   PCP Randall Beata    EYE SURGERY Bilateral     catorac    INCISION AND DRAINAGE PERIRECTAL ABSCESS N/A 03/03/2017    Procedure: INCISION AND DRAINAGE OF JEET ANAL ABSCESS;  Surgeon: Lynette Smith MD;   Location:  [facility] OR;  Service:     INGUINAL HERNIA REPAIR Bilateral 06/27/2023    Procedure: INGUINAL HERNIA BILATERAL REPAIR LAPAROSCOPIC WITH DAVINCI ROBOT WITH MESH;  Surgeon: Tahira Rivera MD;  Location:  [facility] OR;  Service: Robotics - DaVinci;  Laterality: Bilateral;    INSERTION HEMODIALYSIS CATHETER N/A 4/16/2025    Procedure: HEMODIALYSIS CATHETER PLACEMENT;  Surgeon: Gil Pineda DO;  Location:  [facility] HYBRID OR;  Service: Vascular;  Laterality: N/A;    MYRINGOTOMY W/ TUBES Left 04/17/2017    06/10/2016    TONSILLECTOMY      TOTAL HIP ARTHROPLASTY Right 2006      General Information       Row Name 06/03/25 8886          Physical Therapy Time and Intention    Document Type evaluation  pt presents to hosptial w/ dx of PVD & Arterial occulusion. Presents with s/s of LLE pain & swelling. C/o of LLE s/s for weeks prior to stay & recieves dialysis during stay.  -SB (r) AD (t) SB (c)     Mode of Treatment physical therapy  -SB (r) AD (t) SB (c)       Row Name 06/03/25 9685          General Information    Patient Profile Reviewed yes  -SB (r) AD (t) SB (c)     Prior Level of Function independent:;gait;transfer;bed mobility;driving;cleaning;shopping;using stairs  patient has / uses hand rails around tub,walk-in shower and step over tub, prior to sx no AD, since sx uses walker (Rollator)  -SB (r) AD (t) SB (c)     Existing Precautions/Restrictions fall  -SB (r) AD (t) SB (c)     Barriers to Rehab hearing deficit;medically complex;cognitive status  -SB (r) AD (t) SB (c)       Row Name 06/03/25 2269          Living Environment    Current Living Arrangements home  -SB (r) AD (t) SB (c)     People in Home alone  -SB (r) AD (t) SB (c)       Row Name 06/03/25 1462          Home Main Entrance    Number of Stairs, Main Entrance none  -SB (r) AD (t) SB (c)       Row Name 06/03/25 2626          Stairs Within Home, Primary    Number of Stairs, Within Home, Primary one;other (see comments)  not very large  -SB (r)  AD (t) SB (c)     Stair Railings, Within Home, Primary none  -SB (r) AD (t) SB (c)       Row Name 06/03/25 0940          Cognition    Orientation Status (Cognition) oriented x 4;verbal cues/prompts needed for orientation  for month and year  -SB (r) AD (t) SB (c)       Row Name 06/03/25 0940          Safety Issues/Impairments Affecting Functional Mobility    Impairments Affecting Function (Mobility) strength;pain;balance;cognition;endurance/activity tolerance;postural/trunk control  -SB (r) AD (t) SB (c)     Cognitive Impairments, Mobility Safety/Performance attention;awareness, need for assistance;sequencing abilities  -SB (r) AD (t) SB (c)               User Key  (r) = Recorded By, (t) = Taken By, (c) = Cosigned By      Initials Name Provider Type    Raiza Michael, PT DPT Physical Therapist    Yon Flores, PT Student PT Student                   Mobility       Row Name 06/03/25 0940          Bed Mobility    Bed Mobility supine-sit  -SB (r) AD (t) SB (c)     Supine-Sit [place] (Bed Mobility) standby assist  -SB (r) AD (t) SB (c)     Assistive Device (Bed Mobility) bed rails;head of bed elevated  -SB (r) AD (t) SB (c)       Row Name 06/03/25 0940          Bed-Chair Transfer    Bed-Chair [place] (Transfers) maximum assist (25% patient effort);2 person assist  -SB (r) AD (t) SB (c)     Assistive Device (Bed-Chair Transfers) walker, front-wheeled  -SB (r) AD (t) SB (c)       Row Name 06/03/25 0940          Sit-Stand Transfer    Sit-Stand [place] (Transfers) maximum assist (25% patient effort);2 person assist;other (see comments)  max assist needed for termination and static standing stability  -SB (r) AD (t) SB (c)       Row Name 06/03/25 0940          Gait/Stairs (Locomotion)    [place] Level (Gait) not tested  -SB (r) AD (t) SB (c)     Patient was able to Ambulate no, other medical factors prevent ambulation  -SB (r) AD (t) SB (c)     Reason Patient was unable to Ambulate Other  (Comment)  pain, fatigue, extremely lethargic  -SB (r) AD (t) SB (c)               User Key  (r) = Recorded By, (t) = Taken By, (c) = Cosigned By      Initials Name Provider Type    Raiza Michael, PT DPT Physical Therapist    Yon Flores, PT Student PT Student                   Obj/Interventions       Row Name 06/03/25 0940          Range of Motion Comprehensive    General Range of Motion other (see comments);lower extremity range of motion deficits identified  -SB (r) AD (t) SB (c)     Comment, General Range of Motion Ankle DF ROM presented with empty end feels could be secondary to swellng.  -SB (r) AD (t) SB (c)       Row Name 06/03/25 0940          Strength Comprehensive (MMT)    General Manual Muscle Testing (MMT) Assessment lower extremity strength deficits identified  -SB (r) AD (t) SB (c)     Comment, General Manual Muscle Testing (MMT) Assessment BLE hip flex 3/5, knee ext 3/5, RLE DF 2/5. LLE DF 1/5  -SB (r) AD (t) SB (c)       Row Name 06/03/25 0940          Balance    Balance Assessment sitting static balance;sitting dynamic balance;standing static balance  -SB (r) AD (t) SB (c)     Static Sitting Balance contact guard;1-person assist  -SB (r) AD (t) SB (c)     Dynamic Sitting Balance contact guard;1-person assist  -SB (r) AD (t) SB (c)     Position, Sitting Balance supported  -SB (r) AD (t) SB (c)     Static Standing Balance maximum assist;2-person assist  -SB (r) AD (t) SB (c)     Position/Device Used, Standing Balance walker, front-wheeled  -SB (r) AD (t) SB (c)       Row Name 06/03/25 0940          Sensory Assessment (Somatosensory)    Sensory Assessment (Somatosensory) LE sensation intact  -SB (r) AD (t) SB (c)               User Key  (r) = Recorded By, (t) = Taken By, (c) = Cosigned By      Initials Name Provider Type    Raiza Michael, PT DPT Physical Therapist    Yon Flores, PT Student PT Student                   Goals/Plan       Row Name 06/03/25 0940          Bed  Mobility Goal 1 (PT)    Activity/Assistive Device (Bed Mobility Goal 1, PT) sit to supine  -SB (r) AD (t) SB (c)     [place] Level/Cues Needed (Bed Mobility Goal 1, PT) independent  -SB (r) AD (t) SB (c)     Time Frame (Bed Mobility Goal 1, PT) long term goal (LTG)  -SB (r) AD (t) SB (c)     Progress/Outcomes (Bed Mobility Goal 1, PT) new goal  -SB (r) AD (t) SB (c)       Row Name 06/03/25 0935          Transfer Goal 1 (PT)    Activity/Assistive Device (Transfer Goal 1, PT) sit-to-stand/stand-to-sit;bed-to-chair/chair-to-bed;walker, rolling  -SB (r) AD (t) SB (c)     [place] Level/Cues Needed (Transfer Goal 1, PT) minimum assist (75% or more patient effort)  -SB (r) AD (t) SB (c)     Time Frame (Transfer Goal 1, PT) long term goal (LTG)  -SB (r) AD (t) SB (c)     Progress/Outcome (Transfer Goal 1, PT) new goal  -SB (r) AD (t) SB (c)       Row Name 06/03/25 0962          Gait Training Goal 1 (PT)    Activity/Assistive Device (Gait Training Goal 1, PT) gait (walking locomotion);walker, rolling  -SB (r) AD (t) SB (c)     [place] Level (Gait Training Goal 1, PT) minimum assist (75% or more patient effort)  -SB (r) AD (t) SB (c)     Distance (Gait Training Goal 1, PT) 40 ft w/o LOB or feeling fatigued  -SB (r) AD (t) SB (c)     Time Frame (Gait Training Goal 1, PT) long term goal (LTG)  -SB (r) AD (t) SB (c)     Progress/Outcome (Gait Training Goal 1, PT) new goal  -SB (r) AD (t) SB (c)       Row Name 06/03/25 4284          Balance Goal 1 (PT)    Activity/Assistive Device (Balance Goal) standing static balance;standing dynamic balance  -SB (r) AD (t) SB (c)     [place] Level/Cues Needed (Balance Goal 1, PT) supervision required  -SB (r) AD (t) SB (c)     Time Frame (Balance Goal 1, PT) long-term goal (LTG)  -SB (r) AD (t) SB (c)       Row Name 06/03/25 0940          Stairs Goal 1 (PT)    Activity/Assistive Device (Stairs Goal 1, PT) --  -SB (r) AD (t) SB (c)     [place] Level/Cues Needed (Stairs  Goal 1, PT) --  -SB (r) AD (t) SB (c)     Number of Stairs (Stairs Goal 1, PT) --  -SB (r) AD (t) SB (c)     Time Frame (Stairs Goal 1, PT) --  -SB (r) AD (t) SB (c)     Progress/Outcome (Stairs Goal 1, PT) --  -SB (r) AD (t) SB (c)       Row Name 06/03/25 0989          Problem Specific Goal 1 (PT)    Problem Specific Goal 1 (PT) decrease pain to a 4/10 w/ activity  -SB (r) AD (t) SB (c)     Time Frame (Problem Specific Goal 1, PT) long-term goal (LTG)  -SB (r) AD (t) SB (c)     Progress/Outcome (Problem Specific Goal 1, PT) new goal  -SB (r) AD (t) SB (c)       Row Name 06/03/25 0958          Therapy Assessment/Plan (PT)    Planned Therapy Interventions (PT) balance training;bed mobility training;gait training;transfer training;strengthening;patient/family education  -SB (r) AD (t) SB (c)               User Key  (r) = Recorded By, (t) = Taken By, (c) = Cosigned By      Initials Name Provider Type    Raiza Michael, PT DPT Physical Therapist    Yon Flores, PT Student PT Student                   Clinical Impression       Row Name 06/03/25 0984          Pain    Pretreatment Pain Rating 7/10  -SB (r) AD (t) SB (c)     Pain Location foot  -SB (r) AD (t) SB (c)     Pain Side/Orientation left  -SB (r) AD (t) SB (c)     Pain Management Interventions exercise or physical activity utilized;premedicated for activity  -SB (r) AD (t) SB (c)     Response to Pain Interventions activity participation with tolerable pain  -SB (r) AD (t) SB (c)     Additional Documentation Pain Scale: FACES Pre/Post-Treatment (Group)  -SB (r) AD (t) SB (c)       Row Name 06/03/25 0901          Pain Scale: FACES Pre/Post-Treatment    Posttreatment Pain Rating 8-->hurts whole lot  -SB (r) AD (t) SB (c)       Row Name 06/03/25 0988          Plan of Care Review    Plan of Care Reviewed With patient;child  -SB (r) AD (t) SB (c)     Progress no change  -SB (r) AD (t) SB (c)     Outcome Evaluation PT eval completed. Pt was AOx4 with prompts to  help orient the patient to month and year. Pt reports symmetrical sensation in BLE. Pt has BLE pitting edema present, w/ R foot > L foot. Pt demonstrates BLEmm strength hip flex  3/5, knee ext 3/5, RLE DF 2/5, LLE DF 1/5. Pt freq falling asleep in the middle of conersation and parts of the session. Pt performed sts w/ max assist x2 for completion of the transfer and for static standing stab. Pt performed SP transfer to chair w/ max assist x2. Nsg alerted jane tpt was in chair and should get back in bed in an hour. Pt would benefit from skilled PT to improve strength, mobility, and endurance to help the pt return to PLOF. Rec d/c to SNF.  -SB (r) AD (t) SB (c)       Row Name 06/03/25 0940          Therapy Assessment/Plan (PT)    Patient/Family Therapy Goals Statement (PT) none stated  -SB (r) AD (t) SB (c)     Rehab Potential (PT) good  -SB (r) AD (t) SB (c)     Criteria for Skilled Interventions Met (PT) yes;meets criteria;skilled treatment is necessary  -SB (r) AD (t) SB (c)     Therapy Frequency (PT) 2 times/day  -SB (r) AD (t) SB (c)     Predicted Duration of Therapy Intervention (PT) until d/c or goals met  -SB (r) AD (t) SB (c)       Row Name 06/03/25 0940          Vital Signs    O2 Delivery Pre Treatment room air  -SB (r) AD (t) SB (c)     O2 Delivery Intra Treatment room air  -SB (r) AD (t) SB (c)     O2 Delivery Post Treatment room air  -SB (r) AD (t) SB (c)       Row Name 06/03/25 0942          Positioning and Restraints    Pre-Treatment Position in bed  -SB (r) AD (t) SB (c)     Post Treatment Position chair  -SB (r) AD (t) SB (c)     In Chair reclined;call light within reach;encouraged to call for assist;with family/caregiver;notified nsg  -SB (r) AD (t) SB (c)               User Key  (r) = Recorded By, (t) = Taken By, (c) = Cosigned By      Initials Name Provider Type    Raiza Michael, PT DPT Physical Therapist    Yon Flores, PT Student PT Student                   Outcome Measures       Row  Name 06/03/25 0940 06/03/25 0800       How much help from another person do you currently need...    Turning from your back to your side while in flat bed without using bedrails? 3  -SB (r) AD (t) SB (c) 3  -CP    Moving from lying on back to sitting on the side of a flat bed without bedrails? 3  -SB (r) AD (t) SB (c) 3  -CP    Moving to and from a bed to a chair (including a wheelchair)? 2  -SB (r) AD (t) SB (c) 2  -CP    Standing up from a chair using your arms (e.g., wheelchair, bedside chair)? 3  -SB (r) AD (t) SB (c) 1  -CP    Climbing 3-5 steps with a railing? 1  -SB (r) AD (t) SB (c) 1  -CP    To walk in hospital room? 2  -SB (r) AD (t) SB (c) 1  -CP    AM-PAC 6 Clicks Score (PT) 14  -SB (r) AD (t) 11  -CP    Highest Level of Mobility Goal Move to Chair/Commode-4  -SB (r) AD (t) Move to Chair/Commode-4  -CP      Row Name 06/03/25 0940          Functional Assessment    Outcome Measure Options AM-PAC 6 Clicks Basic Mobility (PT)  -SB (r) AD (t) SB (c)               User Key  (r) = Recorded By, (t) = Taken By, (c) = Cosigned By      Initials Name Provider Type    CP Hamida Ledesma, RN Registered Nurse    Raiza Michael, PT DPT Physical Therapist    Yon Flores, PT Student PT Student                                 Physical Therapy Education       Title: PT OT SLP Therapies (In Progress)       Topic: Physical Therapy (In Progress)       Point: Mobility training (Done)       Learning Progress Summary            Patient Acceptance, E, VU,NR by AD at 6/3/2025 1312    Comment: Poc, d/c plans, frequent moving and sitting up during the day, body mechanics, breathing techniques   Family Acceptance, E, VU,NR by AD at 6/3/2025 1312    Comment: Poc, d/c plans, frequent moving and sitting up during the day, body mechanics, breathing techniques                      Point: Home exercise program (Not Started)       Learner Progress:  Not documented in this visit.              Point: Body mechanics (Done)        Learning Progress Summary            Patient Acceptance, E, VU,NR by AD at 6/3/2025 1312    Comment: Poc, d/c plans, frequent moving and sitting up during the day, body mechanics, breathing techniques   Family Acceptance, E, VU,NR by AD at 6/3/2025 1312    Comment: Poc, d/c plans, frequent moving and sitting up during the day, body mechanics, breathing techniques                      Point: Precautions (Done)       Learning Progress Summary            Patient Acceptance, E, VU,NR by AD at 6/3/2025 1312    Comment: Poc, d/c plans, frequent moving and sitting up during the day, body mechanics, breathing techniques   Family Acceptance, E, VU,NR by AD at 6/3/2025 1312    Comment: Poc, d/c plans, frequent moving and sitting up during the day, body mechanics, breathing techniques                                      User Key       Initials Effective Dates Name Provider Type Discipline    AD 04/21/25 -  Yon Magaña, PT Student PT Student PT                  PT Recommendation and Plan  Planned Therapy Interventions (PT): balance training, bed mobility training, gait training, transfer training, strengthening, patient/family education  Progress: no change  Outcome Evaluation: PT eval completed. Pt was AOx4 with prompts to help orient the patient to month and year. Pt reports symmetrical sensation in BLE. Pt has BLE pitting edema present, w/ R foot > L foot. Pt demonstrates BLEmm strength hip flex  3/5, knee ext 3/5, RLE DF 2/5, LLE DF 1/5. Pt freq falling asleep in the middle of conersation and parts of the session. Pt performed sts w/ max assist x2 for completion of the transfer and for static standing stab. Pt performed SP transfer to chair w/ max assist x2. Nsg alerted jane tpt was in chair and should get back in bed in an hour. Pt would benefit from skilled PT to improve strength, mobility, and endurance to help the pt return to PLOF. Rec d/c to SNF.     Time Calculation:         PT Charges       Row Name 06/03/25  1334             Time Calculation    Start Time 0934  -SB (r) AD (t) SB (c)      Stop Time 1034  -SB (r) AD (t) SB (c)      Time Calculation (min) 60 min  -SB (r) AD (t)      PT Received On 06/03/25  -SB (r) AD (t) SB (c)      PT Goal Re-Cert Due Date 06/13/25  -SB (r) AD (t) SB (c)                User Key  (r) = Recorded By, (t) = Taken By, (c) = Cosigned By      Initials Name Provider Type    Raiza Michael, PT DPT Physical Therapist    Yon Flores, PT Student PT Student                      PT G-Codes  Outcome Measure Options: AM-PAC 6 Clicks Basic Mobility (PT)  AM-PAC 6 Clicks Score (PT): 14  PT Discharge Summary  Anticipated Discharge Disposition (PT): skilled nursing facility    Yon Reese PT Student  6/3/2025

## 2025-06-03 NOTE — PROGRESS NOTES
Malnutrition Severity Assessment    Patient Name:  Ricardo Hugo  YOB: 1950  MRN: [id number]  Admit Date:  5/21/2025    Patient meets criteria for : Severe Malnutrition    Comments:  Pt in room with family, sleeping but did wake up for dietitian. Pt extremely lethargic. Average intake over the last 72 hours is 63%; PO fluid 480 mL/day. Family reports consistent intake of less than 50%. S/p angioplasty of the left lower extremity on 5/30, with improvements noted per provider documentation. Low Hgb today; blood transfusion planned. Admission wt: 136 lb; current wt: 131 lb. Last BM on 5/30. Family reports a 40 lb wt loss since the fall reflecting 24% wt loss in 8 months. NPFE performed: pt appears severely malnourished in the context of chronic illness, with significant muscle and fat wasting present. Will increase Boost to TID for additional nutrition support. Dietitian encouraged pt to aim for consuming at least 50% of meal trays consistently and to increase intake as tolerated. Possible d/c tomorrow. Will continue to monitor.     Malnutrition Severity Assessment  Malnutrition Type: Chronic Disease - Related Malnutrition  Malnutrition Type (Last 8 Hours)       Malnutrition Severity Assessment       Row Name 06/03/25 1604       Malnutrition Severity Assessment    Malnutrition Type Chronic Disease - Related Malnutrition      Row Name 06/03/25 1604       Unintentional Weight Loss     Unintentional Weight Loss Findings Severe    Unintentional Weight Loss  Weight loss of 10% in six months  Family reports a 40 lb wt loss since fall reflecting 24% wt loss in 8 months.      Row Name 06/03/25 1604       Muscle Loss    Loss of Muscle Mass Findings Severe    [place] Region Severe - deep hollowing/scooping, lack of muscle to touch, facial bones well defined    Clavicle Bone Region Severe - protruding prominent bone    Acromion Bone Region Severe - squared shoulders, bones, and acromion process protrusion  prominent    Scapular Bone Region Severe - prominent bones, depressions easily visible between ribs, scapula, spine, shoulders    Dorsal Hand Region Severe - prominent depression    Patellar Region Severe - prominent bone, square looking, very little muscle definition    Anterior Thigh Region Severe - line/depression along thigh, obviously thin    Posterior Calf Region Severe - thin with very little definition/firmness      Row Name 06/03/25 1604       Fat Loss    Subcutaneous Fat Loss Findings Severe    Orbital Region  Severe - pronounced hollowness/depression, dark circles, loose saggy skin    Upper Arm Region Severe - mostly skin, very little space between folds, fingers touch    Thoracic & Lumbar Region Severe - ribs visible with prominent depressions, iliac crest very prominent      Row Name 06/03/25 1604       Criteria Met (Must meet criteria for severity in at least 2 of these categories: M Wasting, Fat Loss, Fluid, Secondary Signs, Wt. Status, Intake)    Patient meets criteria for  Severe Malnutrition                    Electronically signed by:  Usha Blake RDN, LD  06/03/25 16:14 CDT

## 2025-06-03 NOTE — PROGRESS NOTES
[facility] Medicine Services  INPATIENT PROGRESS NOTE    Patient Name: Ricardo Hugo  Date of Admission: 5/21/2025  Today's Date: 06/03/25  Length of Stay: 13  Primary Care Physician: Nathan Zimmer MD    Subjective   Chief Complaint: Peripheral vascular disease/pain/dialysis/anemia/thrombocytopenia   HPI   Blood pressure is high but stable, restart hydralazine, afebrile.  Hemoglobin 7 decreased, plan for 1 unit of blood transfusion today.  Creatinine decreasing.  Platelet count stable.  Patient is on room air.    Review of Systems   Constitutional:  Positive for activity change, appetite change and fatigue. Negative for chills and fever.   HENT:  Negative for hearing loss, nosebleeds, tinnitus and trouble swallowing.    Eyes:  Negative for visual disturbance.   Respiratory:  Negative for cough, chest tightness, shortness of breath and wheezing.    Cardiovascular:  Negative for chest pain, palpitations and leg swelling.   Gastrointestinal:  Negative for abdominal distention, abdominal pain, blood in stool, constipation, diarrhea, nausea and vomiting.   Endocrine: Negative for cold intolerance, heat intolerance, polydipsia, polyphagia and polyuria.   Genitourinary:  Negative for decreased urine volume, difficulty urinating, dysuria, flank pain, frequency and hematuria.   Musculoskeletal:  Positive for arthralgias, gait problem and myalgias. Negative for joint swelling.   Skin:  Negative for rash.   Allergic/Immunologic: Negative for immunocompromised state.   Neurological:  Positive for weakness. Negative for dizziness, syncope, light-headedness and headaches.   Hematological:  Negative for adenopathy. Does not bruise/bleed easily.   Psychiatric/Behavioral:  Negative for confusion and sleep disturbance. The patient is not nervous/anxious.   All pertinent negatives and positives are as above. All other systems have been reviewed and are negative unless otherwise stated.      Objective    Temp:  [97.5 °F (36.4 °C)-99.1 °F (37.3 °C)] 98.5 °F (36.9 °C)  Heart Rate:  [62-77] 62  Resp:  [16] 16  BP: (119-164)/(46-58) 164/46  Physical Exam  Vitals and nursing note reviewed.   Constitutional:       Comments: Advanced age.  Cachectic.  Chronically ill.   HENT:      Head: Normocephalic.   Eyes:      Conjunctiva/sclera: Conjunctivae normal.      Pupils: Pupils are equal, round, and reactive to light.   Cardiovascular:      Rate and Rhythm: Normal rate and regular rhythm.      Heart sounds: Normal heart sounds.   Pulmonary:      Effort: No respiratory distress.      Comments: Diminished breath sound bilateral, clear, on room air.  Abdominal:      General: Bowel sounds are normal. There is no distension.      Palpations: Abdomen is soft.      Tenderness: There is no abdominal tenderness.   Musculoskeletal:         General: No swelling.      Cervical back: Neck supple.   Skin:     General: Skin is warm and dry.      Findings: No rash.   Neurological:      Mental Status: He is alert and oriented to person, place, and time.      Motor: Weakness present.      Coordination: Coordination abnormal.      Gait: Gait abnormal.   Psychiatric:         Mood and Affect: Mood normal.         Behavior: Behavior normal.         Thought Content: Thought content normal.       Results Review:  I have reviewed the labs, radiology results, and diagnostic studies.    Laboratory Data:   Results from last 7 days   Lab Units 06/03/25 0429 06/02/25 0643 06/01/25  0304   WBC 10*3/mm3 6.80 8.08 9.21   HEMOGLOBIN g/dL 7.0* 7.4* 7.6*   HEMATOCRIT % 22.1* 23.1* 24.2*   PLATELETS 10*3/mm3 120* 127* 120*        Results from last 7 days   Lab Units 06/03/25  0429 06/02/25  0643 06/01/25  0304   SODIUM mmol/L 137 136 137   POTASSIUM mmol/L 4.2 4.6 4.5   CHLORIDE mmol/L 99 103 101   CO2 mmol/L 27.0 22.0 20.0*   BUN mg/dL 24.2* 53.6* 41.9*   CREATININE mg/dL 2.79* 4.48* 3.68*   CALCIUM mg/dL 8.2* 8.5* 8.5*   GLUCOSE mg/dL 109*  "104* 98       Culture Data:   No results found for: \"BLOODCX\", \"URINECX\", \"WOUNDCX\", \"MRSACX\", \"RESPCX\", \"STOOLCX\"    Radiology Data:   Imaging Results (Last 24 Hours)       Procedure Component Value Units Date/Time    IR Angiogram Extremity [[id number]] Collected: 06/02/25 0651     Updated: 06/02/25 1534    Narrative:      Performed by Dr. Pineda. Please see procedure note.     This report was signed and finalized on 6/2/2025 3:31 PM by Dr. Gil Pineda MD.       FL C Arm During Surgery [[id number]] Collected: 06/02/25 0651     Updated: 06/02/25 1534    Narrative:      Performed by Dr. Pineda. Please see procedure note.     This report was signed and finalized on 6/2/2025 3:31 PM by Dr. Gil Pineda MD.               I have reviewed the patient's current medications.     Assessment/Plan   Assessment  Active Hospital Problems    Diagnosis     **Arterial occlusion     Thrombocytopenia     Acute pain of left lower extremity     ESRD (end stage renal disease) on dialysis     Abnormal TSH     Chronic diastolic (congestive) heart failure     Anticoagulated     Sensorineural hearing loss (SNHL), bilateral     Eustachian tube dysfunction, bilateral     Nasal septal deviation     Mixed hyperlipidemia     CLL (chronic lymphocytic leukemia)     Anemia due to chronic kidney disease     Diastolic dysfunction     Symptomatic anemia     Resistant hypertension     Peripheral arterial disease     IHD (ischemic heart disease)     Essential hypertension     Chronic rhinitis        Treatment Plan  Acute ischemic left lower extremity /left leg pain.  Vascular consult.  Aspirin . Plavix.  Status post surgery 5/22/2025-Right common femoral artery access under ultrasound guidance, left posterior tibial artery access ultrasound guidance, Left lower extremity arteriogram with third order selection angiographic interpretation, PTA of the left SFA with a 3 x 60 Tammy cross balloon, Shockwave lithotripsy of the left external iliac " artery with a 6 x 60 shockwave balloon, Left external iliac artery stenting with a 8 x 60 ever flex stent postdilated with a 7 x 60 Ever Cross balloon  Status post 5/30/2025-Introduction of catheter/sheath of the aorta, Contralateral cannulation of the left common iliac artery, Left lower extremity angiogram with radiographic supervision, Crossing of a below-knee popliteal artery chronic total occlusion, Balloon angioplasty of the proximal SFA and below-knee popliteal artery chronic total occlusion using a 4 x 40 mm Medtronic drug-coated balloon, Selective cannulation of the peroneal artery and anterior tibial artery, Balloon angioplasty of the proximal anterior tibial artery and entire peroneal tibial artery and tibioperoneal trunk using a 2-1.5 x 210 mm  and 2.5-2 x 210 mm Tammy cross balloons, Mynx closure of the right common femoral artery  Vascular recommendation follow-up in 2 weeks.    Anemia.  Status post 2 units of blood transfusion.  Hemoglobin decreased..   Niferex . Procrit.  Plan for 1 unit of blood transfusion today.    Thrombocytopenia.  Platelet count stable        Shortness of breath/Rales/ pneumonia.  COPD.  Not exacerbation.  DuoNebs.  Singulair.  Hypothyroidism.  Synthroid.  Incentive spirometer.  Zosyn.  Chest x-ray-New airspace consolidation in the RIGHT midlung- concerning for pneumonia, small pleural effusion at the RIGHT lung base, Chronic changes.  Patient is on room air.       CAD/hypertension/hyperlipidemia/CHF.  Aspirin . Lipitor.  Coreg . Catapres.  Plavix.  Procardia.  Restart hydralazine. Isordil . Aldactone.  Diovan . hydralazine as needed.  Nitro as needed.  Echocardiogram 1/11/2025- 56 - 60%,  mild septal asymmetric hypertrophy, diastolic function is consistent with (grade II w/high LAP) pseudonormalization, Normal right ventricular cavity size and systolic function, left atrial cavity is mild to moderately dilated, right atrial cavity is dilated, Mild aortic valve stenosis, Mild to  moderate mitral valve regurgitation, Moderate to severe tricuspid valve regurgitation,  tricuspid regurgitation is markedly elevated (>55 mmHg).       End-stage renal disease.  Dialysis patient.  Calcitriol . Creatinine decreased.  Dialysis Monday Wednesday Friday.     Hyponatremia.  Sodium stable.     Hypokalemia.  Resolved. Magnesium-normal.    Reflux . Protonix.  Compazine as needed.  Phenergan as needed.     Prostate hypertrophy.  Flomax.     Anxiety/depression.  Xanax as needed.     Pain.  Morphine as needed.  Percocet as needed.  Neurontin at Night.  Flexeril as needed.     Nutrition . Diabetic diet.   Boost supplement.  Fortified pudding.     Deconditioning.  PT consult.  Patient gets around a walker at baseline.     Patient lives by himself.  Patient is mostly bedbound at this point.  Patient will need to go to rehab.   consult.     Medical Decision Making  Number and Complexity of problems: Ischemic left leg/end-stage renal disease/hypokalemia/anemia/thrombocytopenia/CAD/CHF/COPD  Differential Diagnosis: None     Conditions and Status        Condition is unchanged.     MDM Data  External documents reviewed: Previous note .  Cardiac tracing (EKG, telemetry) interpretation: No monitor  Radiology interpretation: Echo  Labs reviewed: Laboratory  Any tests that were considered but not ordered: Lab in a.m.     Decision rules/scores evaluated (example [id number], Wells, etc): None     Discussed with: Patient and daughter     Care Planning  Shared decision making: Patient and daughter  Code status and discussions: Full code     Disposition  Social Determinants of Health that impact treatment or disposition: From home  Patient will need rehab placement.  Plan for rehab placement tomorrow after dialysis.              Electronically signed by Garrett Comer MD, 06/03/25, 08:26 CDT.

## 2025-06-03 NOTE — PLAN OF CARE
Goal Outcome Evaluation:  Plan of Care Reviewed With: patient           Outcome Evaluation: Patient stable overnight. c/o pain, see mar. plans to discharge to [facility]. call light within reach and safety maintained

## 2025-06-03 NOTE — PROGRESS NOTES
Nephrology ([facility]) Progress Note      Patient:  Ricardo Hugo  YOB: 1950  Date of Service: 6/3/2025  MRN: [id number]   Acct: [id number]   Primary Care Physician: Nathan Zimmer MD  Advance Directive:   Code Status and Medical Interventions: CPR (Attempt to Resuscitate); Full Support   Ordered at: 05/21/25 1822     Code Status (Patient has no pulse and is not breathing):    CPR (Attempt to Resuscitate)     Medical Interventions (Patient has pulse or is breathing):    Full Support     Admit Date: 5/21/2025       Hospital Day: 13  Referring Provider: No ref. provider found      Patient personally seen and examined.  Complete chart including Consults, Notes, Operative Reports, Labs, Cardiology, and Radiology studies reviewed as able.        Subjective:  Ricardo Hugo is a 75 y.o. male for whom we were consulted for evaluation and treatment of end stage renal disease on hemodialysis Monday Wednesday Friday.  Presented with left lower leg pain. Recently had right lower extremity angiogram which has significantly improved the pain he was having in his right leg. Dr Zabala took patient to OR on 5/22 for angiogram and stenting of left external iliac artery.  Unfortunately pain in left leg has persisted and a left AKA had been considered.  Underwent angioplasty of left lower extremity on 5/30 and some blood flow was restored. Pain has since been improving. Has been tolerating inpatient HD well since arrival.     Today is awake and alert. No new complaint or overnight issues. Possibly discharging to [facility] today.        Allergies:  Ondansetron, Zofran [ondansetron hcl], Lortab [hydrocodone-acetaminophen], and Allopurinol    Home Meds:  Medications Prior to Admission   Medication Sig Dispense Refill Last Dose/Taking    ALPRAZolam (XANAX) 0.25 MG tablet Take 1 tablet by mouth Every Night.   Taking    aspirin (aspirin) 81 MG EC tablet Take 1 tablet by mouth Daily.   Taking     atorvastatin (LIPITOR) 10 MG tablet Take 1 tablet by mouth Daily. 90 tablet 3 Taking    calcitriol (ROCALTROL) 0.5 MCG capsule Take 1 capsule by mouth Daily. 90 capsule 2 Taking    carvedilol (COREG) 25 MG tablet Take 1 tablet by mouth 2 (Two) Times a Day With Meals.   Taking    cholestyramine light 4 g packet Take 1 packet by mouth Daily. 90 packet 1 Taking    cloNIDine (CATAPRES) 0.3 MG tablet Take 1 tablet by mouth 3 times a day.   Taking    clopidogrel (PLAVIX) 75 MG tablet Take 1 tablet by mouth Daily.   Taking    Copper Gluconate (Copper Caps) 2 MG capsule Take 2 mg by mouth Daily.   Taking    empagliflozin (Jardiance) 10 MG tablet tablet Take 1 tablet by mouth Daily.   Taking    fexofenadine (ALLEGRA) 180 MG tablet Take 1 tablet by mouth Daily.   Taking    furosemide (LASIX) 40 MG tablet Take 1 tablet by mouth Daily. 90 tablet 2 Taking    hydrALAZINE (APRESOLINE) 100 MG tablet Take 1 tablet by mouth 3 (Three) Times a Day. 100mg daily   Taking    isosorbide dinitrate (ISORDIL) 10 MG tablet Take 1 tablet by mouth 3 (Three) Times a Day. 270 tablet 2 Taking    levothyroxine (Synthroid) 100 MCG tablet Take 1 tablet by mouth Every Morning. 90 tablet 2 Taking    magnesium chloride ER 64 MG DR tablet Take 1 tablet by mouth Daily.   Taking    Melatonin 10 MG tablet Take 1 tablet by mouth Every Night.   Taking    mesalamine (APRISO) 0.375 g 24 hr capsule Take 4 capsules by mouth Daily. 360 capsule 1 Taking    Multiple Vitamins-Minerals (PRESERVISION/LUTEIN) capsule Take 1 capsule by mouth 2 (two) times a day.   Taking    NIFEdipine XL (PROCARDIA XL) 60 MG 24 hr tablet Take 1 tablet by mouth Daily.   Taking    omeprazole (priLOSEC) 20 MG capsule Take 1 capsule by mouth Daily.   Taking    sodium bicarbonate 650 MG tablet Take 1 tablet by mouth 5 (Five) Times a Day.   Taking    spironolactone (ALDACTONE) 25 MG tablet Take 1 tablet by mouth Daily.   Taking    tamsulosin (FLOMAX) 0.4 MG capsule 24 hr capsule Take 1  capsule by mouth Every Night.   Taking    valsartan (DIOVAN) 320 MG tablet Take 1 tablet by mouth Daily.   Taking    vitamin D (ERGOCALCIFEROL) 1.25 MG (53306 UT) capsule capsule Take 1 capsule by mouth 1 (One) Time Per Week. Mondays   Taking    albuterol sulfate  (90 Base) MCG/ACT inhaler Inhale 2 puffs Every 6 (Six) Hours As Needed for Wheezing or Shortness of Air.       aspirin-acetaminophen-caffeine (EXCEDRIN MIGRAINE) 250-250-65 MG per tablet Take 1 tablet by mouth Every 6 (Six) Hours As Needed for Headache. (Patient taking differently: Take 3 tablets by mouth Every 6 (Six) Hours As Needed for Headache. Patient reports he takes 6-12 every day.)       Budeson-Glycopyrrol-Formoterol (Breztri Aerosphere) 160-9-4.8 MCG/ACT aerosol inhaler Inhale 2 puffs 2 (Two) Times a Day.       desoximetasone (TOPICORT) 0.25 % cream Apply 1 Application topically to the appropriate area as directed 2 (Two) Times a Day As Needed for Irritation. irritation       diphenhydrAMINE HCl, Sleep, 50 MG/30ML liquid Take 15 mL by mouth At Night As Needed (insomnia).       docusate sodium (COLACE) 100 MG capsule Take 1 capsule by mouth 3 (Three) Times a Day As Needed for Constipation.       fluticasone (FLONASE) 50 MCG/ACT nasal spray Administer 2 sprays into the nostril(s) as directed by provider Daily As Needed for Allergies.       montelukast (SINGULAIR) 10 MG tablet TAKE 1 TABLET EVERY NIGHT 90 tablet 3        Medicines:  Current Facility-Administered Medications   Medication Dose Route Frequency Provider Last Rate Last Admin    ALPRAZolam (XANAX) tablet 0.25 mg  0.25 mg Oral Nightly PRN BickingGil, DO   0.25 mg at 06/02/25 2055    aspirin EC tablet 81 mg  81 mg Oral Daily BickingGil, DO   81 mg at 06/03/25 0842    atorvastatin (LIPITOR) tablet 10 mg  10 mg Oral Daily BickingGil, DO   10 mg at 06/03/25 0842    sennosides-docusate (PERICOLACE) 8.6-50 MG per tablet 2 tablet  2 tablet Oral BID PRN Bicking,  Gil FUCHS DO   2 tablet at 05/31/25 2011    And    polyethylene glycol (MIRALAX) packet 17 g  17 g Oral Daily PRN BickingGil DO        And    bisacodyl (DULCOLAX) EC tablet 5 mg  5 mg Oral Daily PRN BickingGil DO   5 mg at 05/28/25 0634    And    bisacodyl (DULCOLAX) suppository 10 mg  10 mg Rectal Daily PRN BickingGil DO        calcitriol (ROCALTROL) capsule 0.5 mcg  0.5 mcg Oral Daily BickingGil DO   0.5 mcg at 06/03/25 0841    carvedilol (COREG) tablet 25 mg  25 mg Oral BID With Meals Gil Pineda DO   25 mg at 06/03/25 0842    cloNIDine (CATAPRES) tablet 0.3 mg  0.3 mg Oral TID BickingGil DO   0.3 mg at 06/03/25 0843    clopidogrel (PLAVIX) tablet 75 mg  75 mg Oral Daily BickingGil DO   75 mg at 06/03/25 0842    cyclobenzaprine (FLEXERIL) tablet 5 mg  5 mg Oral TID PRN BicGil donald DO   5 mg at 06/03/25 0423    epoetin cecile-epbx (RETACRIT) 5,000 Units 2 mL injection  5,000 Units Subcutaneous Once per day on Monday Wednesday Friday Gil Pineda DO   5,000 Units at 06/02/25 1325    gabapentin (NEURONTIN) capsule 300 mg  300 mg Oral Nightly BicGil donald DO   300 mg at 06/02/25 2055    heparin (porcine) injection 3,200 Units  3,200 Units Intracatheter PRN BicGil donald DO   3,200 Units at 06/02/25 1248    heparin (porcine) injection 3,600 Units  3,600 Units Intravenous Once BickingGil DO        hydrALAZINE (APRESOLINE) injection 10 mg  10 mg Intravenous Q6H PRN BickingGil DO   10 mg at 05/22/25 0654    [Held by provider] hydrALAZINE (APRESOLINE) tablet 50 mg  50 mg Oral TID Garrett Comer MD        ipratropium-albuterol (DUO-NEB) nebulizer solution 3 mL  3 mL Nebulization TID PRN Gil Pineda DO        iron polysaccharides (NIFEREX) capsule 150 mg  150 mg Oral Daily Gil Pineda DO   150 mg at 06/03/25 0843    isosorbide dinitrate (ISORDIL) tablet 10 mg  10 mg Oral TID - Nitrates Gil Pineda  SHILA DO   10 mg at 06/03/25 0841    levothyroxine (SYNTHROID, LEVOTHROID) tablet 100 mcg  100 mcg Oral Q AM BickingGil DO   100 mcg at 06/03/25 0530    montelukast (SINGULAIR) tablet 10 mg  10 mg Oral Nightly BickingGil DO   10 mg at 06/02/25 2055    NIFEdipine XL (PROCARDIA XL) 24 hr tablet 90 mg  90 mg Oral Daily BickingGil DO   90 mg at 06/03/25 0842    nitroglycerin (NITROSTAT) SL tablet 0.4 mg  0.4 mg Sublingual Q5 Min PRN BickingGil DO        oxyCODONE-acetaminophen (PERCOCET) 5-325 MG per tablet 1 tablet  1 tablet Oral Q4H PRN BickingGil DO   1 tablet at 06/03/25 0842    pantoprazole (PROTONIX) EC tablet 40 mg  40 mg Oral Q AM BickingGil DO   40 mg at 06/03/25 0530    prochlorperazine (COMPAZINE) injection 5 mg  5 mg Intravenous Q6H PRN BicGil donald DO   5 mg at 05/28/25 1752    Or    prochlorperazine (COMPAZINE) tablet 5 mg  5 mg Oral Q6H PRN BickingGil DO        Or    prochlorperazine (COMPAZINE) suppository 25 mg  25 mg Rectal Q12H PRN BickingGil, DO        promethazine (PHENERGAN) 12.5 mg in sodium chloride 0.9 % 50 mL  12.5 mg Intravenous Q6H PRN BickingGil, DO        sodium chloride 0.9 % flush 10 mL  10 mL Intravenous PRN BickingGil, DO        sodium chloride 0.9 % flush 10 mL  10 mL Intravenous Q12H BickingGil, DO   10 mL at 06/03/25 0843    sodium chloride 0.9 % flush 10 mL  10 mL Intravenous PRN BickingGil, DO        sodium chloride 0.9 % infusion 40 mL  40 mL Intravenous PRN BickingGil, DO        spironolactone (ALDACTONE) tablet 25 mg  25 mg Oral Daily BickingGil, DO   25 mg at 06/03/25 0841    tamsulosin (FLOMAX) 24 hr capsule 0.4 mg  0.4 mg Oral Nightly Gil Pineda DO   0.4 mg at 06/02/25 2055    valsartan (DIOVAN) tablet 320 mg  320 mg Oral Daily Gil Pineda DO   320 mg at 06/03/25 0842       Past Medical History:  Past Medical History:   Diagnosis Date     3-vessel CAD 08/11/2020    Allergic rhinitis     Anemia     Anxiety disorder 04/27/2020    Arthritis     Asymmetrical sensorineural hearing loss 06/28/2017    Atherosclerosis of native artery of both lower extremities with intermittent claudication 07/18/2019    Avascular necrosis of femoral head, left 07/11/2020    right hip after surgery    Carotid stenosis     Chronic mucoid otitis media     Chronic rhinitis     COPD (chronic obstructive pulmonary disease)     Coronary artery disease     HEART BYPASS 2004    Crohn's disease of large intestine with other complication 07/30/2020    Chronic diarrhea Colonoscopy July 2020 revealed mild patchy scattered hemosiderin staining with inflammation more so in rectosigmoid area.  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    Iron deficiency anemia 07/14/2020    Mixed hearing loss of left ear     PAD (peripheral artery disease) 08/11/2020    Perianal abscess     Pernicious anemia 08/17/2020    took shots but never diagnosed with b12 deficiency    Personal history of alcoholism 08/11/2020    quit drinking in 2013    Prostatic hypertrophy 08/11/2020    Sensorineural hearing loss     Sepsis with acute renal failure 09/15/2020    Shortness of breath 05/27/2021    Tinnitus     Ventricular tachycardia, nonsustained 07/14/2020    Weight loss 07/11/2020       Past Surgical History:  Past Surgical History:   Procedure Laterality Date    AORTOGRAM Right 4/25/2025    Procedure: RIGHT LOWER EXTREMITY ANGIOGRAM, SHOCKWAVE LITHOTRIPSY, BALLOON ANGIOPLASTY, MYNX CLOSURE;  Surgeon: Gil Pineda DO;  Location: [facility] HYBRID OR;   Service: Vascular;  Laterality: Right;    AORTOGRAM Left 5/22/2025    Procedure: LEFT LOWER EXTREMITY ANGIOGRAM, SHOCKWAVE LITHOTRIPSY, BALLOON ANGIOPLASTY, STENT PLACEMENT, MYNX CLOSURE;  Surgeon: Blayne Zabala MD;  Location: [facility] HYBRID OR;  Service: Vascular;  Laterality: Left;    AORTOGRAM Right 5/30/2025    Procedure: AORTOILIAC ANGIOGRAM WITH LEFT LOWER EXTREMITY ANGIOGRAM;  Surgeon: Gil Pineda DO;  Location: [facility] HYBRID OR;  Service: Vascular;  Laterality: Right;    ARTERY SURGERY  2021    right side on neck    CAROTID ENDARTERECTOMY Right 05/10/2021    Procedure: RIGHT CAROTID ENDARTERECTOMY WITH EEG;  Surgeon: Gil Pineda DO;  Location: [facility] HYBRID OR 12;  Service: Vascular;  Laterality: Right;    COLONOSCOPY N/A 07/02/2020    Procedure: COLONOSCOPY WITH ANESTHESIA;  Surgeon: Adrien Brewster MD;  Location: [facility] ENDOSCOPY;  Service: Gastroenterology;  Laterality: N/A;  pre op: diarrhea  post op: polyps  PCP: Joe Velasco MD    COLONOSCOPY N/A 10/13/2020    Procedure: COLONOSCOPY WITH ANESTHESIA;  Surgeon: Adrien Brewster MD;  Location: [facility] ENDOSCOPY;  Service: Gastroenterology;  Laterality: N/A;  Pre: Chronic Diarrhea, Crohn's  Post: AVM  Dr. Neftali Velasco  CO2 Inflation Used    COLONOSCOPY N/A 12/08/2023    Procedure: COLONOSCOPY WITH ANESTHESIA;  Surgeon: Adrien Brewster MD;  Location: [facility] ENDOSCOPY;  Service: Gastroenterology;  Laterality: N/A;  pre chrone's disease  post sub optimal prep, polyp, chrone's      CORONARY ARTERY BYPASS GRAFT  2003    x3    ENDOSCOPY N/A 11/02/2021    Procedure: ESOPHAGOGASTRODUODENOSCOPY WITH ANESTHESIA;  Surgeon: Bridger Bell MD;  Location: [facility] ENDOSCOPY;  Service: Gastroenterology;  Laterality: N/A;  pre anemia;gi bleed  post  gi bleed;fabian ring  Dr. ERIC Velasco    ENDOSCOPY N/A 10/10/2023    Procedure: ESOPHAGOGASTRODUODENOSCOPY WITH ANESTHESIA;  Surgeon: Adrien Brewster MD;  Location: [facility] ENDOSCOPY;   Service: Gastroenterology;  Laterality: N/A;  preop; anemia  postop esophagitis ; R/O barretts   PCP Randall Beata    EYE SURGERY Bilateral     catorac    INCISION AND DRAINAGE PERIRECTAL ABSCESS N/A 2017    Procedure: INCISION AND DRAINAGE OF JEET ANAL ABSCESS;  Surgeon: Lynette Smith MD;  Location:  [facility] OR;  Service:     INGUINAL HERNIA REPAIR Bilateral 2023    Procedure: INGUINAL HERNIA BILATERAL REPAIR LAPAROSCOPIC WITH DAVINCI ROBOT WITH MESH;  Surgeon: Tahira Rivera MD;  Location:  [facility] OR;  Service: Robotics - DaVinci;  Laterality: Bilateral;    INSERTION HEMODIALYSIS CATHETER N/A 2025    Procedure: HEMODIALYSIS CATHETER PLACEMENT;  Surgeon: Gil Pineda DO;  Location:  [facility] HYBRID OR;  Service: Vascular;  Laterality: N/A;    MYRINGOTOMY W/ TUBES Left 2017    06/10/2016    TONSILLECTOMY      TOTAL HIP ARTHROPLASTY Right        Family History  Family History   Problem Relation Age of Onset    Breast cancer Mother     Dementia Father     Glaucoma Father     No Known Problems Daughter     Colon polyps Neg Hx     Colon cancer Neg Hx        Social History  Social History     Socioeconomic History    Marital status:    Tobacco Use    Smoking status: Former     Current packs/day: 0.00     Average packs/day: 0.5 packs/day for 25.8 years (12.9 ttl pk-yrs)     Types: Cigarettes     Start date:      Quit date: 10/13/2013     Years since quittin.6     Passive exposure: Past    Smokeless tobacco: Never    Tobacco comments:     quit    Vaping Use    Vaping status: Former    Substances: Nicotine    Devices: Pre-filled or refillable cartridge   Substance and Sexual Activity    Alcohol use: Not Currently    Drug use: No    Sexual activity: Not Currently     Partners: Female       Review of Systems:  History obtained from chart review and the patient  General ROS: No fever or chills  Respiratory ROS: No cough, shortness of breath, wheezing  Cardiovascular  ROS: No chest pain or palpitations  Gastrointestinal ROS: No abdominal pain or melena  Genito-Urinary ROS: No dysuria or hematuria  Psych ROS: No anxiety and depression  14 point ROS reviewed with the patient and negative except as noted above and in the HPI unless unable to obtain.    Objective:  Patient Vitals for the past 24 hrs:   BP Temp Temp src Pulse Resp SpO2 Weight   06/03/25 0744 164/46 98.5 °F (36.9 °C) Oral 62 16 96 % --   06/03/25 0418 144/52 98.3 °F (36.8 °C) Oral 65 16 94 % 59.5 kg (131 lb 3.2 oz)   06/02/25 2021 140/49 99.1 °F (37.3 °C) Oral 77 16 93 % --   06/02/25 1540 121/58 97.5 °F (36.4 °C) Oral 75 16 94 % --   06/02/25 1328 119/47 97.9 °F (36.6 °C) Oral 72 16 94 % --       Intake/Output Summary (Last 24 hours) at 6/3/2025 0923  Last data filed at 6/2/2025 1908  Gross per 24 hour   Intake 480 ml   Output --   Net 480 ml     General: awake/alert   Chest:  clear to auscultation bilaterally without respiratory distress  CVS: regular rate and rhythm  Abdominal: soft, nontender, positive bowel sounds  Extremities: no cyanosis or edema  Skin: warm and dry without rash      Labs:  Results from last 7 days   Lab Units 06/03/25 0429 06/02/25 0643 06/01/25  0304   WBC 10*3/mm3 6.80 8.08 9.21   HEMOGLOBIN g/dL 7.0* 7.4* 7.6*   HEMATOCRIT % 22.1* 23.1* 24.2*   PLATELETS 10*3/mm3 120* 127* 120*         Results from last 7 days   Lab Units 06/03/25 0429 06/02/25  0643 06/01/25  0304   SODIUM mmol/L 137 136 137   POTASSIUM mmol/L 4.2 4.6 4.5   CHLORIDE mmol/L 99 103 101   CO2 mmol/L 27.0 22.0 20.0*   BUN mg/dL 24.2* 53.6* 41.9*   CREATININE mg/dL 2.79* 4.48* 3.68*   CALCIUM mg/dL 8.2* 8.5* 8.5*   EGFR mL/min/1.73 22.6* 12.8* 16.2*   GLUCOSE mg/dL 109* 104* 98       Radiology:   Imaging Results (Last 72 Hours)       Procedure Component Value Units Date/Time    IR Angiogram Extremity [[id number]] Collected: 06/02/25 0651     Updated: 06/02/25 1534    Narrative:      Performed by Dr. Pineda. Please see  "procedure note.     This report was signed and finalized on 6/2/2025 3:31 PM by Dr. Gil Pineda MD.       FL C Arm During Surgery [[id number]] Collected: 06/02/25 0651     Updated: 06/02/25 1534    Narrative:      Performed by Dr. Pineda. Please see procedure note.     This report was signed and finalized on 6/2/2025 3:31 PM by Dr. Gil Pineda MD.               Culture:  No results found for: \"BLOODCX\", \"URINECX\", \"WOUNDCX\", \"MRSACX\", \"RESPCX\", \"STOOLCX\"      Assessment    End stage renal disease on HD MWF  Hypertension  Anemia of CKD  Peripheral vascular disease--s/p angiogram on 5/22 and multiple balloon angioplasties in the left lower extremity on 5/30   Thrombocytopenia  Hypokalemia--improved  Hyponatremia--improved    Plan:   Dialysis next due 6/04  OK for discharge from renal standpoint. Follow up will be via dialysis clinic      Slava Tate, STERLING  6/3/2025  09:23 CDT    "

## 2025-06-03 NOTE — PLAN OF CARE
Goal Outcome Evaluation:  Plan of Care Reviewed With: (P) patient, child        Progress: (P) no change  Outcome Evaluation: (P) PT eval completed. Pt was AOx4 with prompts to help orient the patient to month and year. Pt reports symmetrical sensation in BLE. Pt has BLE pitting edema present, w/ R foot > L foot. Pt demonstrates BLEmm strength hip flex  3/5, knee ext 3/5, RLE DF 2/5, LLE DF 1/5. Pt freq falling asleep in the middle of conersation and parts of the session. Pt performed sts w/ max assist x2 for completion of the transfer and for static standing stab. Pt performed SP transfer to chair w/ max assist x2. Nsg alerted jane tpt was in chair and should get back in bed in an hour. Pt would benefit from skilled PT to improve strength, mobility, and endurance to help the pt return to PLOF. Rec d/c to SNF.    Anticipated Discharge Disposition (PT): (P) skilled nursing facility

## 2025-06-03 NOTE — PROGRESS NOTES
Assessment tool to be used for patients with existing breathing treatments ordered by hospitalist                               Respiratory Therapist Driven Protocol - RT to Assess and Treat Algorithm    Item 0 Points 1 Point 2 Points 3 Points 4 Points Subtotal   Mental Status Alert, orientated, cooperative Lethargic, follows commands Confused, not following commands Obtunded or Somnolent Comatose 0   Respiratory Pattern Regular RR  8-16 breaths/minute Increased RR  18-25 breaths/minute Dyspnea on exertion, irregular RR  26-30/minute Shortness of breath,  RR 31-35 breaths/minute Accessory muscle use, severe SOB  RR > 35 breath/minute 0   Breath Sounds Clear Decreased unilaterally Decreased bilaterally Basilar crackles Wheezing and/or rhonchi 0   Cough Strong, spontaneous non-productive Strong productive Weak, non-productive Weak productive or weak with rhonchi Absent or may require suctioning 0   Pulmonary Status Nonsmoker or no previous history > 1 year quit < 1 PPD  < 1 year quit >  or = 1 PPD Diagnosed pulmonary disease (severe or chronic) Severe or chronic pulmonary disease with exacerbation 3   Surgical Status None General surgery (non-abdominal or non-thoracic) Lower abdominal Thoracic or upper abdominal Thoracic with pulmonary disease 0   Chest X-ray Clear Chronic changes Infiltrates, atelectasis or pleural effusion Infiltrates > 1 lobe Diffuse infiltrates and atelectasis and/or effusions    Activity level Ambulatory Ambulatory with assistance Non-ambulatory Paraplegic Quadriplegic 1                     Total Score 4   Score    Drug Therapy Frequency  20 or >    Q4 Duoneb & Q3 Albuterol PRN 15 - 19     Q6 Duoneb & Q4 Albuterol PRN 10 - 14    QID Duoneb & Q4 Albuterol PRN 5 - 9    TID Duoneb & Q6 Albuterol PRN 0 - 4    Q4 PRN Duoneb or Q4 PRN Albuterol    Incentive Spirometry - Initial RT instruct    Lung Expansion Therapy (PEP) Bronchopulmonary Hygiene (CPT)   Q4 & PRN - Severe atelectasis,  poor oxygenation Q4 - copious secretions, dyspnea, unable to sleep, mucus plugging   QID - High risk for persistent atelectasis, existence of atelectasis QID & Q4 PRN - Moderate secretion production   TID - At risk for developing atelectasis TID - small amounts of secretions with poor cough   BID - prevention of atelectasis BID - unable to breathe deeply and cough spontaneously   *RT Protocol patients will be re-assessed/re-evaluated every 48 hours.    *Patients who are home nebulizer treatments will be protocoled to no less than their home regimen and will remain     on their home regimen with re-evaluations as needed with changes in patient condition.    RT Comments/Recommendations: Keep nebs PRN

## 2025-06-03 NOTE — PLAN OF CARE
Goal Outcome Evaluation:  Plan of Care Reviewed With: patient           Outcome Evaluation: Patient stable overnight. c/o pain, see mar. plans to discharge to [facility]. call light within reach and safety maintained

## 2025-06-03 NOTE — PLAN OF CARE
Problem: Adult Inpatient Plan of Care  Goal: Plan of Care Review  Outcome: Progressing  Flowsheets (Taken 6/3/2025 1336)  Outcome Evaluation: pt worked with PT today- max assist x2. up in chair for 30 minutes today, c/o pain- gave PRN meds with relief. resting well/drowsy at times but arousable. family at bedside. BLE - unable to doppler pulses. feet warm. increased redness this afternoon to BLE. turned as pt would allow. O2 @ RA. 1 unit of blood started on shift for hgb 7.0. continue to monitor. d/c planning to [facility]. dialysis tomorrow then possible d/c.  Goal: Patient-Specific Goal (Individualized)  Outcome: Progressing  Goal: Absence of Hospital-Acquired Illness or Injury  Outcome: Progressing  Intervention: Identify and Manage Fall Risk  Recent Flowsheet Documentation  Taken 6/3/2025 1300 by Hamida Ledesma, RN  Safety Promotion/Fall Prevention:   activity supervised   assistive device/personal items within reach   clutter free environment maintained   fall prevention program maintained   nonskid shoes/slippers when out of bed   room organization consistent   safety round/check completed  Taken 6/3/2025 1000 by Hamida Ledesma, RN  Safety Promotion/Fall Prevention:   activity supervised   assistive device/personal items within reach   clutter free environment maintained   fall prevention program maintained   nonskid shoes/slippers when out of bed   room organization consistent   safety round/check completed  Taken 6/3/2025 0900 by Hamida Ledesma, RN  Safety Promotion/Fall Prevention:   activity supervised   assistive device/personal items within reach   clutter free environment maintained   fall prevention program maintained   nonskid shoes/slippers when out of bed   room organization consistent   safety round/check completed  Taken 6/3/2025 0800 by Hamida Ledesma, RN  Safety Promotion/Fall Prevention:   activity supervised   assistive device/personal items within reach   clutter free environment  maintained   fall prevention program maintained   nonskid shoes/slippers when out of bed   room organization consistent   safety round/check completed  Intervention: Prevent Skin Injury  Recent Flowsheet Documentation  Taken 6/3/2025 0800 by Hamida Ledesma RN  Body Position: sitting up in bed  Goal: Optimal Comfort and Wellbeing  Outcome: Progressing  Intervention: Monitor Pain and Promote Comfort  Recent Flowsheet Documentation  Taken 6/3/2025 1300 by Hamida Ledesma RN  Pain Management Interventions: relaxation techniques promoted  Taken 6/3/2025 1254 by Hamida Ledesma RN  Pain Management Interventions: pain medication given  Taken 6/3/2025 0900 by Hamida Ledesma RN  Pain Management Interventions: relaxation techniques promoted  Taken 6/3/2025 0841 by Hamida Ledesma RN  Pain Management Interventions: pain medication given  Goal: Readiness for Transition of Care  Outcome: Progressing   Goal Outcome Evaluation:              Outcome Evaluation: pt worked with PT today- max assist x2. up in chair for 30 minutes today, c/o pain- gave PRN meds with relief. resting well/drowsy at times but arousable. family at bedside. BLE - unable to doppler pulses. feet warm. increased redness this afternoon to BLE. turned as pt would allow. O2 @ RA. 1 unit of blood started on shift for hgb 7.0. continue to monitor. d/c planning to [facility]. dialysis tomorrow then possible d/c.

## 2025-06-04 PROBLEM — I50.32 CHRONIC HEART FAILURE WITH PRESERVED EJECTION FRACTION (HFPEF): Status: ACTIVE | Noted: 2025-06-04

## 2025-06-04 PROBLEM — E43 SEVERE PROTEIN-CALORIE MALNUTRITION: Status: ACTIVE | Noted: 2025-06-04

## 2025-06-04 LAB
ALBUMIN SERPL-MCNC: 3 G/DL (ref 3.5–5.2)
ALBUMIN/GLOB SERPL: 1.2 G/DL
ALP SERPL-CCNC: 192 U/L (ref 39–117)
ALT SERPL W P-5'-P-CCNC: 9 U/L (ref 1–41)
ANION GAP SERPL CALCULATED.3IONS-SCNC: 11 MMOL/L (ref 5–15)
AST SERPL-CCNC: 33 U/L (ref 1–40)
BASOPHILS # BLD AUTO: 0.06 10*3/MM3 (ref 0–0.2)
BASOPHILS NFR BLD AUTO: 0.9 % (ref 0–1.5)
BH BB BLOOD EXPIRATION DATE: NORMAL
BH BB BLOOD TYPE BARCODE: 9500
BH BB DISPENSE STATUS: NORMAL
BH BB PRODUCT CODE: NORMAL
BH BB UNIT NUMBER: NORMAL
BILIRUB SERPL-MCNC: 0.5 MG/DL (ref 0–1.2)
BUN SERPL-MCNC: 34.9 MG/DL (ref 8–23)
BUN/CREAT SERPL: 10.1 (ref 7–25)
CALCIUM SPEC-SCNC: 8.8 MG/DL (ref 8.6–10.5)
CHLORIDE SERPL-SCNC: 101 MMOL/L (ref 98–107)
CO2 SERPL-SCNC: 25 MMOL/L (ref 22–29)
CREAT SERPL-MCNC: 3.45 MG/DL (ref 0.76–1.27)
CROSSMATCH INTERPRETATION: NORMAL
DEPRECATED RDW RBC AUTO: 64 FL (ref 37–54)
EGFRCR SERPLBLD CKD-EPI 2021: 17.5 ML/MIN/1.73
EOSINOPHIL # BLD AUTO: 0.39 10*3/MM3 (ref 0–0.4)
EOSINOPHIL NFR BLD AUTO: 5.5 % (ref 0.3–6.2)
ERYTHROCYTE [DISTWIDTH] IN BLOOD BY AUTOMATED COUNT: 18 % (ref 12.3–15.4)
GLOBULIN UR ELPH-MCNC: 2.6 GM/DL
GLUCOSE SERPL-MCNC: 110 MG/DL (ref 65–99)
HCT VFR BLD AUTO: 27.3 % (ref 37.5–51)
HGB BLD-MCNC: 8.7 G/DL (ref 13–17.7)
IMM GRANULOCYTES # BLD AUTO: 0.12 10*3/MM3 (ref 0–0.05)
IMM GRANULOCYTES NFR BLD AUTO: 1.7 % (ref 0–0.5)
LYMPHOCYTES # BLD AUTO: 1.01 10*3/MM3 (ref 0.7–3.1)
LYMPHOCYTES NFR BLD AUTO: 14.3 % (ref 19.6–45.3)
MCH RBC QN AUTO: 31.3 PG (ref 26.6–33)
MCHC RBC AUTO-ENTMCNC: 31.9 G/DL (ref 31.5–35.7)
MCV RBC AUTO: 98.2 FL (ref 79–97)
MONOCYTES # BLD AUTO: 0.56 10*3/MM3 (ref 0.1–0.9)
MONOCYTES NFR BLD AUTO: 7.9 % (ref 5–12)
NEUTROPHILS NFR BLD AUTO: 4.91 10*3/MM3 (ref 1.7–7)
NEUTROPHILS NFR BLD AUTO: 69.7 % (ref 42.7–76)
NRBC BLD AUTO-RTO: 0 /100 WBC (ref 0–0.2)
PLATELET # BLD AUTO: 146 10*3/MM3 (ref 140–450)
PMV BLD AUTO: 10.1 FL (ref 6–12)
POTASSIUM SERPL-SCNC: 4.5 MMOL/L (ref 3.5–5.2)
PROT SERPL-MCNC: 5.6 G/DL (ref 6–8.5)
RBC # BLD AUTO: 2.78 10*6/MM3 (ref 4.14–5.8)
SODIUM SERPL-SCNC: 137 MMOL/L (ref 136–145)
UNIT  ABO: NORMAL
UNIT  RH: NORMAL
WBC NRBC COR # BLD AUTO: 7.05 10*3/MM3 (ref 3.4–10.8)

## 2025-06-04 PROCEDURE — 97110 THERAPEUTIC EXERCISES: CPT

## 2025-06-04 PROCEDURE — 25010000002 EPOETIN ALFA-EPBX 2000 UNIT/ML SOLUTION 1 ML VIAL: Performed by: SURGERY

## 2025-06-04 PROCEDURE — 99231 SBSQ HOSP IP/OBS SF/LOW 25: CPT | Performed by: NURSE PRACTITIONER

## 2025-06-04 PROCEDURE — 97116 GAIT TRAINING THERAPY: CPT

## 2025-06-04 PROCEDURE — 25010000002 HEPARIN (PORCINE) PER 1000 UNITS: Performed by: SURGERY

## 2025-06-04 PROCEDURE — 25010000002 EPOETIN ALFA-EPBX 3000 UNIT/ML SOLUTION 1 ML VIAL: Performed by: SURGERY

## 2025-06-04 PROCEDURE — 80053 COMPREHEN METABOLIC PANEL: CPT | Performed by: INTERNAL MEDICINE

## 2025-06-04 PROCEDURE — 85025 COMPLETE CBC W/AUTO DIFF WBC: CPT | Performed by: INTERNAL MEDICINE

## 2025-06-04 RX ORDER — NIFEDIPINE 60 MG/1
60 TABLET, EXTENDED RELEASE ORAL DAILY
Status: DISCONTINUED | OUTPATIENT
Start: 2025-06-04 | End: 2025-06-05

## 2025-06-04 RX ORDER — DESOXIMETASONE 2.5 MG/G
1 CREAM TOPICAL 2 TIMES DAILY PRN
Qty: 60 G | Refills: 0 | Status: SHIPPED | OUTPATIENT
Start: 2025-06-04

## 2025-06-04 RX ORDER — CLONIDINE HYDROCHLORIDE 0.1 MG/1
0.2 TABLET ORAL 3 TIMES DAILY
Status: DISCONTINUED | OUTPATIENT
Start: 2025-06-04 | End: 2025-06-04

## 2025-06-04 RX ORDER — IRON POLYSACCHARIDE COMPLEX 150 MG
150 CAPSULE ORAL DAILY
Start: 2025-06-04

## 2025-06-04 RX ORDER — GABAPENTIN 300 MG/1
300 CAPSULE ORAL NIGHTLY
Qty: 24 CAPSULE | Refills: 0 | Status: SHIPPED | OUTPATIENT
Start: 2025-06-04

## 2025-06-04 RX ORDER — VALSARTAN 80 MG/1
160 TABLET ORAL DAILY
Status: DISCONTINUED | OUTPATIENT
Start: 2025-06-04 | End: 2025-06-05

## 2025-06-04 RX ORDER — ALPRAZOLAM 0.25 MG
0.25 TABLET ORAL NIGHTLY PRN
Qty: 24 TABLET | Refills: 0 | Status: SHIPPED | OUTPATIENT
Start: 2025-06-04

## 2025-06-04 RX ORDER — OXYCODONE AND ACETAMINOPHEN 5; 325 MG/1; MG/1
1 TABLET ORAL EVERY 6 HOURS PRN
Qty: 24 TABLET | Refills: 0 | Status: SHIPPED | OUTPATIENT
Start: 2025-06-04

## 2025-06-04 RX ORDER — CARVEDILOL 12.5 MG/1
12.5 TABLET ORAL 2 TIMES DAILY WITH MEALS
Start: 2025-06-04

## 2025-06-04 RX ORDER — VALSARTAN 160 MG/1
160 TABLET ORAL DAILY
Start: 2025-06-04 | End: 2025-06-05 | Stop reason: HOSPADM

## 2025-06-04 RX ORDER — LEVOTHYROXINE SODIUM 100 UG/1
100 TABLET ORAL
Qty: 90 TABLET | Refills: 2 | Status: SHIPPED | OUTPATIENT
Start: 2025-06-04

## 2025-06-04 RX ORDER — HYDRALAZINE HYDROCHLORIDE 50 MG/1
50 TABLET, FILM COATED ORAL 3 TIMES DAILY
Start: 2025-06-04 | End: 2025-06-05 | Stop reason: HOSPADM

## 2025-06-04 RX ADMIN — TAMSULOSIN HYDROCHLORIDE 0.4 MG: 0.4 CAPSULE ORAL at 21:32

## 2025-06-04 RX ADMIN — SPIRONOLACTONE 25 MG: 25 TABLET ORAL at 10:43

## 2025-06-04 RX ADMIN — OXYCODONE HYDROCHLORIDE AND ACETAMINOPHEN 1 TABLET: 5; 325 TABLET ORAL at 18:26

## 2025-06-04 RX ADMIN — Medication 10 ML: at 06:29

## 2025-06-04 RX ADMIN — CARVEDILOL 12.5 MG: 6.25 TABLET, FILM COATED ORAL at 10:43

## 2025-06-04 RX ADMIN — GABAPENTIN 300 MG: 300 CAPSULE ORAL at 21:32

## 2025-06-04 RX ADMIN — CLOPIDOGREL BISULFATE 75 MG: 75 TABLET, FILM COATED ORAL at 10:43

## 2025-06-04 RX ADMIN — Medication 10 ML: at 06:28

## 2025-06-04 RX ADMIN — Medication 10 ML: at 10:44

## 2025-06-04 RX ADMIN — HYDRALAZINE HYDROCHLORIDE 50 MG: 50 TABLET ORAL at 10:42

## 2025-06-04 RX ADMIN — ATORVASTATIN CALCIUM 10 MG: 10 TABLET, FILM COATED ORAL at 10:43

## 2025-06-04 RX ADMIN — ISOSORBIDE DINITRATE 10 MG: 10 TABLET ORAL at 18:26

## 2025-06-04 RX ADMIN — HYDRALAZINE HYDROCHLORIDE 50 MG: 50 TABLET ORAL at 21:32

## 2025-06-04 RX ADMIN — NIFEDIPINE 60 MG: 60 TABLET, FILM COATED, EXTENDED RELEASE ORAL at 10:42

## 2025-06-04 RX ADMIN — Medication 10 ML: at 04:22

## 2025-06-04 RX ADMIN — MONTELUKAST SODIUM 10 MG: 10 TABLET, COATED ORAL at 21:32

## 2025-06-04 RX ADMIN — ISOSORBIDE DINITRATE 10 MG: 10 TABLET ORAL at 10:42

## 2025-06-04 RX ADMIN — Medication 10 ML: at 21:34

## 2025-06-04 RX ADMIN — OXYCODONE HYDROCHLORIDE AND ACETAMINOPHEN 1 TABLET: 5; 325 TABLET ORAL at 10:43

## 2025-06-04 RX ADMIN — CALCITRIOL CAPSULES 0.25 MCG 0.5 MCG: 0.25 CAPSULE ORAL at 10:42

## 2025-06-04 RX ADMIN — OXYCODONE HYDROCHLORIDE AND ACETAMINOPHEN 1 TABLET: 5; 325 TABLET ORAL at 04:20

## 2025-06-04 RX ADMIN — ASPIRIN 81 MG: 81 TABLET, COATED ORAL at 10:43

## 2025-06-04 RX ADMIN — CARVEDILOL 12.5 MG: 6.25 TABLET, FILM COATED ORAL at 18:26

## 2025-06-04 RX ADMIN — HEPARIN SODIUM 3200 UNITS: 1000 INJECTION INTRAVENOUS; SUBCUTANEOUS at 15:53

## 2025-06-04 RX ADMIN — HYDRALAZINE HYDROCHLORIDE 50 MG: 50 TABLET ORAL at 18:26

## 2025-06-04 RX ADMIN — VALSARTAN 160 MG: 80 TABLET, FILM COATED ORAL at 10:42

## 2025-06-04 RX ADMIN — PANTOPRAZOLE SODIUM 40 MG: 40 TABLET, DELAYED RELEASE ORAL at 06:28

## 2025-06-04 RX ADMIN — EPOETIN ALFA-EPBX 5000 UNITS: 3000 INJECTION, SOLUTION INTRAVENOUS; SUBCUTANEOUS at 10:43

## 2025-06-04 RX ADMIN — LEVOTHYROXINE SODIUM 100 MCG: 0.1 TABLET ORAL at 06:27

## 2025-06-04 NOTE — PROGRESS NOTES
1.5 L removed. Pt stable.     [facility]  DIALYSIS TREATMENT SUMMARY     Note: Consult with the attending physician for patient treatment orders, this document is not a physician order.     Patient Information  Patient Ricardo Hugo  Date of Birth February 22, 1950  Chart Number [id number]  Location [facility]  Location MRN [id number]  Gender Male  SSN (last 4)   Treatment Information  Treatment Type Hemodialysis  Treatment Id [id number]  Start Time June 04, 2025 12:07  End Time June 04, 2025 15:38  Acutal Duration 03:31  Treatment Balances  Total Saline Administered 500  Net Fluid Balance -1500  Hemodialysis Orders  Therapy Standard  Orders Verified Time 06/04/2025 07:00   Date Verified 06/04/2025  Duration 3:30  Isolated UF/Bypass No  BFR (mL) 450  DFR (mL) 700  Dialyzer Type OPTIFLUX 160NR  UF Order UF Range  UF Range (mL) 2000 - 3000  With BP Parameters Yes  As Tolerated Yes  Crit-Line used No  Heparin Initial Units Bolus No  Heparin IV Maintenance Bolus No  Heparin IV Infusion No  Potassium (mEq/L) 3.0  Calcium (mEq/L) 2.5  Bicarb (mg/dL) 33  Sodium (mEq/L) 140  Clinician Erika Hines RN  Dialysis Access  Access Type Central Venous Catheter  Central Venous Catheter  Access Type Catheter - Tunneled  Access Location Chest Wall - R  Current/New [facility] Patient Yes  1st Use Catheter Verified by Previous Use  Catheter Care Completed per Policy Yes  Dressing Dry and Intact on Arrival Yes  Dressing Changed No  Type of Dressing Film Biopatch/CHG  Last Date Changed 06/02/2025  If No select Reason Dressing Change Not Indicated per Policy  Type of HD Caps Curos  HD Caps Changed Yes  CVC Line Education Provided Yes - Dressing Change Frequency  Vitals  Pre-Treatment Vitals  Time Is BP being recorded? Pre BP Map BP Method Pulse RR Temp How was Weight Obtained? Pre Weight Previous Dry Weight Previous Post Weight Metric Target Fluid Removal (mL) Dialysate Confirmed Clinician  06/04/2025  12:06 BP/Map 143/90 (108) Noninvasive 83 16 97.9 How Obtained: Bed Scale 60.7 - - Kgs 3500 Yes Erika Hines RN  Comments: Patient treatment in dialysis suite. Patient alert, talking to HD RN. Patient denies pain or distress at this time.  Intra Procedure Vitals  Rec Type Time Is BP being recorded? BP Map Pulse RR BFR DFR AP  TMP UFR RMVD Bolus NSS Flush Chills Safety Check Crit-Line HCT Crit-Line BV% Clinician  E 06/04/2025 12:06 BP/Map 176/90 (119) 73 16 450 700 -190 150 60 570 0 Yes 250  Yes   Erika Hines RN  Comments: HD started via (R) IJ perm catheter, venous clips applied, prime given, lines secured. Machine alarms passed, DFR at ordered rate. Treatment in progress.  E 06/04/2025 12:30 BP/Map 195/86 (122) 54 16 450 700 -190 150 60 570 248    Yes   Erika Hines RN  Comments: HD access visible, venous clips present, lines secured Patient resting quietly in bed. Treatment in progress; will maintain current treatment plan at this time.  E 06/04/2025 13:00 BP/Map 191/96 (128) 69 16 450 700 -190 150 60 570 581    Yes   Erika Hines RN  Comments: HD access visible, venous clips present, lines secured Patient resting quietly in bed. Treatment in progress; will maintain current treatment plan at this time.  E 06/04/2025 13:30 BP/Map 178/80 (113) 61 16 450 700 -190 150 60 570 782    Yes   Erika Hines RN  Comments: HD access visible, venous clips present, lines secured Patient resting quietly in bed. Treatment in progress; will maintain current treatment plan at this time.  E 06/04/2025 14:00 BP/Map 188/94 (125) 76 16 450 700 -190 150 60 860 1073    Yes   Erika Hines RN  Comments: HD access visible, venous clips present, lines secured Patient resting quietly in bed. Treatment in progress; will maintain current treatment plan at this time.  E 06/04/2025 14:30 BP/Map 188/68 (108) 76 16 450 700 -190 150 60 570 1328    Yes   Erika M Hines, RN  Comments: HD access visible, lines secured. Patient resting well,  eyes opened. Treatment in progress, will maintain current treatment plan at this time.  E 06/04/2025 15:00 BP/Map 195/63 (107) 67 16 450 700 -190 150 60 570 1695    Yes   Erika Hines RN  Comments: HD access visible, lines secured. Patient resting well, eyes opened. Treatment in progress, will maintain current treatment plan at this time.  E 06/04/2025 15:30 BP/Map 193/80 (118) 58 16 450 700 -200 150 60 570 1912    Yes   Kristan Beck RN  Comments: pt awake and alert. vital signs stable - hypertensive (pt asymptomatic). access/lines visible and secure.  E 06/04/2025 15:38 BP/Map 180/89 (119) 59 18 200 700 -10 60 30 300 2000    Yes   Kristan Beck RN  Comments: tx complete. blood returning.  Post Treatment Vitals  Time Is BP being recorded? BP Map Pulse RR Temp How was Weight Obtained? Post Weight Metric BVP UF Goal Ordered NSS Given Intra-Procedure Total Machine UF Removed (mL) Crit-Line Ending Profile Crit-Line Refill Crit-Line Ending HCT Crit-Line Max BV% Clinician  06/04/2025 15:53 BP/Map 194/81 (119) 65 18 97.6 How Obtained: Reported Floor Weight 59.2 Kgs 87.5 2912 804 9320     Kristan Beck RN  Comments: blood returned  Safety checks include: access uncovered and secured, Hemaclip secured for all central line access, machine checks performed, and alarm limits confirmed.  Labs  Hepatitis  HBsAG Lab Result HBsAG Lab Result HBsAG Draw Date Transient Antigenemia(MD Diagnosis Only) Anti-HBs Lab Result Anti-HBs Lab Value Anti-HBs Draw Date Documented By Documented Date Hepatitis Status Hepatitis Status  Negative  01/06/2025  Positive  08/21/2024 Erika Hines 06/04/2025  Immune  Notes:   Pre-Treatment Hepatitis Precautions Hepatitis Information Entered By Erika Hines RN Signed By Erika Hines RN  Copy of hepatitis results verified in hospital EMR Yes Signing   Pre-Treatment Handoff  Staff Report Received Yes  Report Given by Primary Nurse Isidro Chan RN  Time 11:35  Patient Arrival  Patient ID  Verified Date of Birth  Full Name  Patient Consent to treatment verified Yes  Blood Transfusion Consent Verified N/A  Treatment Comments  Treatment Notes Hemodynamically stable post HD today. 1.5 L removed. pt stable.  Post-Treatment Handoff  Report Given to Primary Nurse Isidro Chan  Time Report Given 15:55  Report Given By Kristan Beck RN  Machine Validation  Time 11:30  Date 6/4/2025  Auto Alarm Test Passed Yes  Machine Serial # [id number]  Portable RO Yes  RO Serial# 17  Residual Bleach Negative Yes  Was a manufactured mix used? Yes  Machine Log Completed Yes  Total Chlorine (less than 0.1)? Yes  Total Chlorine Log Completed Yes  Bicarb BiBag  Bibag Size 900  Machine Temp 36  Machine Conductivity 13.9  Meter Type N/A  Meter Conductivity   Independent Conductivity 13.7  pH Status Pass Pass  pH   TCD Value 13.8  TCD Alarm with +/- 0.5 Yes  NVL enabled validated 100 asymmetric Yes  Safety check complete Erika Hines RN  Second Verification Performed? Yes  Second Verification Performed By Clare Fabian  Reason Not Verified   Serum Lab Values  Time BUN Creatinine Na K (mEq/L) Cl CO2 Ca (mEq/L) Phos Mg (mg/dL) Alb (g/dL) Glucose Hgb Hct WBC Plt PT aPTT INR Other Clinician  06/04/2025 03:39 34.9 3.45 137 4.5 101 25 8.8 - - 3 110 8.7 27.3 7.05 146 - - - - Erika Hines RN  Comments: LABS  Facility Information Room # 389 Diagnosis Arterial occlusion  Facility Information Bed # 3 Isolation Information  Admission Date 05/21/2025 Stat Treatment No Isolation Required? N  Ordering MD [place] Patient Type Chronic dialysis patient with diagnosis of ESRD Completed by  Account/Finance Number [id number] Patient Chronic Unit [facility] information Entered by Erika Hines RN  Admission Status InPatient Code Status Full Code   Location Dialysis Suite Multi Diagnosis   Start Treatment Time Out Confirmed by DEMETRIUS James/ GRACIELA Sparks Correct access site verified Yes  Treatment Initiation Connections Secured Time Out  Completed 11:58 Treatment Start Date 06/04/2025  Saline line double clamped Correct patient verified Yes Treatment Start Time 12:07  Hemaclip Applied Correct procedure verified Yes Patient/Family questions and concerns addressed Yes  Pre Focused Assessment Edema LOC Alert and Oriented x3  Access Location Generalized GI / Bowels  Signs and Symptoms of Infection? No Edema Grade 1+ GI Bowel sounds present  Pain Screening Cardiac   Does the patient have pain? No Heart Rhythm Regular  Voids  Respiratory Telemetry No Completed by  Lung Sounds Clear Skin Pre Treatment Focused Assessment Completed By Erika Hines RN  Location Bilateral Skin Warm Time 12:00  Position Anterior  Dry Signing  Respiratory Efforts Unlabored LOC Signed By Erika Hines RN  Education Method Knowledge / Understanding Assessed Teach back Method Knowledge / Understanding Assessed Teach back  Patient Education Family Education Provided? N/A Patient Education Introduced By  Patient Educated? Yes Caregiver Education Provided? Yes Patient Education Introduced By Erika Hines RN  Focus or Topic Hemodialysis treatment review Focus or Topic Hemodialysis treatment review   Post-Treatment HD machine external disinfection completed per policy Yes Completed by  Post Treatment Delay PRO external disinfection completed per policy Yes Post Treatment Form Completed By Erika Hines RN  Delay N Post Treatment General Information   Machine Disinfection Requirement Notes verbal to primary nurse   Post Focused Assessment Lung Sounds Clear LOC  Changes from Pre Focused Assessment Location Bilateral LOC Alert and Oriented x3  Changes from Pre Treatment Focused Assessment? No Position Anterior GI / Bowels  Access Respiratory Efforts Unlabored GI Bowel sounds present  Cath Packed with heparin 1000 units/mL Edema   Access Port(ml) 1.6 Location Generalized  Voids  Return Port(ml) 1.6 Edema Grade 1+ Completed by  Catheter clamped and capped Yes Cardiac Post  Treatment Focused Assessment Completed by Erika Hines RN  Access Flow Good Heart Rhythm Regular Date 06/04/2025  Dialyzer Clearance Streaked Telemetry No Time 15:54  Pain Screening Skin Signing  Does the patient have pain? No Skin Warm Signed By Kristan Beck RN  Respiratory  Dry

## 2025-06-04 NOTE — PROGRESS NOTES
LOS:13 days  Patient Care Team:  Nathan Zimmer MD as PCP - General (Internal Medicine)  Adrien Brewster MD as Consulting Physician (Gastroenterology)  Eleuterio Farley MD (Inactive) as Cardiologist (Cardiology)  Elena Jon APRN as Referring Physician (Vascular Surgery)  Bolivar Rueda MD as Consulting Physician (Nephrology)  Slava Tate APRN as Nurse Practitioner (Family Medicine)  New Omalley MD as Consulting Physician (Pulmonary Disease)  Becky Camacho APRN as Nurse Practitioner (Hematology and Oncology)  Gil Pineda DO as Consulting Physician (Vascular Surgery)    Chief Complaint: leg pain    Subjective     Patient seen/examined with no significant complaints of pain.  Very drowsy.  Alert but falls asleep during conversation.  Feet and legs swollen, warm with Doppler signals present. Daughter at bedside.     Objective     Vital Signs  Temp:  [96.6 °F (35.9 °C)-98.5 °F (36.9 °C)] 97.9 °F (36.6 °C)  Heart Rate:  [] 102  Resp:  [16-18] 18  BP: ()/(34-78) 173/71    Physical Exam  Vitals and nursing note reviewed.   Constitutional:       General: He is not in acute distress.     Appearance: Normal appearance. He is underweight. He is ill-appearing. He is not diaphoretic.   HENT:      Head: Normocephalic. No right periorbital erythema or left periorbital erythema.      Nose: Nose normal.   Eyes:      General: No scleral icterus.     Pupils: Pupils are equal.   Cardiovascular:      Rate and Rhythm: Normal rate and regular rhythm.      Pulses:           Dorsalis pedis pulses are detected w/ Doppler on the left side.        Posterior tibial pulses are 0 on the left side.      Heart sounds: Normal heart sounds. No murmur heard.     Comments: Left: doppler DP(ankle)/peroneal  Right:doppler PT/peroneal    Pulmonary:      Effort: Pulmonary effort is normal. No respiratory distress.      Breath sounds: Normal breath sounds.   Abdominal:       General: Bowel sounds are normal. There is no distension.      Palpations: Abdomen is soft.      Tenderness: There is no abdominal tenderness. There is no guarding.   Musculoskeletal:         General: Swelling present. No tenderness. Normal range of motion.      Cervical back: Normal range of motion and neck supple.      Right lower leg: No edema.      Left lower leg: No edema.   Feet:      Right foot:      Skin integrity: Skin integrity normal.   Skin:     General: Skin is warm and dry.      Findings: No erythema or rash.   Neurological:      General: No focal deficit present.      Mental Status: He is alert and oriented to person, place, and time. Mental status is at baseline.      Cranial Nerves: No cranial nerve deficit.      Gait: Gait normal.   Psychiatric:         Attention and Perception: Attention normal.         Mood and Affect: Mood normal.         Behavior: Behavior normal.         Thought Content: Thought content normal.         Judgment: Judgment normal.         Laboratory Data:   Results from last 7 days   Lab Units 06/04/25  0339 06/03/25  0429 06/02/25  0643   WBC 10*3/mm3 7.05 6.80 8.08   HEMOGLOBIN g/dL 8.7* 7.0* 7.4*   HEMATOCRIT % 27.3* 22.1* 23.1*   PLATELETS 10*3/mm3 146 120* 127*       Results from last 7 days   Lab Units 06/04/25  0339 06/03/25  0429 06/02/25  0643   SODIUM mmol/L 137 137 136   POTASSIUM mmol/L 4.5 4.2 4.6   CHLORIDE mmol/L 101 99 103   CO2 mmol/L 25.0 27.0 22.0   BUN mg/dL 34.9* 24.2* 53.6*   CREATININE mg/dL 3.45* 2.79* 4.48*   CALCIUM mg/dL 8.8 8.2* 8.5*   BILIRUBIN mg/dL 0.5  --   --    ALK PHOS U/L 192*  --   --    ALT (SGPT) U/L 9  --   --    AST (SGOT) U/L 33  --   --    GLUCOSE mg/dL 110* 109* 104*                Medication Review: Reviewed    Assessment & Plan       Arterial occlusion    Chronic rhinitis    Essential hypertension    Resistant hypertension    Symptomatic anemia    Peripheral arterial disease    IHD (ischemic heart disease)    Diastolic dysfunction     Anemia due to chronic kidney disease    CLL (chronic lymphocytic leukemia)    Mixed hyperlipidemia    Eustachian tube dysfunction, bilateral    Sensorineural hearing loss (SNHL), bilateral    Anticoagulated    Nasal septal deviation    Chronic diastolic (congestive) heart failure    Abnormal TSH    ESRD (end stage renal disease) on dialysis    Acute pain of left lower extremity    Thrombocytopenia    Severe protein-calorie malnutrition    Chronic heart failure with preserved ejection fraction (HFpEF)          Plan for disposition:  D/C Flexeril  Change frequency of pain medication  Continue PT  Discharge planning per hospitalist  . Receiving 1 unit PRBC today      STERLING Murray  Vascular Surgery  [phone]  06/03/25

## 2025-06-04 NOTE — PROGRESS NOTES
Nephrology ([facility]) Progress Note      Patient:  Ricardo Hugo  YOB: 1950  Date of Service: 6/4/2025  MRN: [id number]   Acct: [id number]   Primary Care Physician: Nathan Zimmer MD  Advance Directive:   Code Status and Medical Interventions: CPR (Attempt to Resuscitate); Full Support   Ordered at: 05/21/25 1822     Code Status (Patient has no pulse and is not breathing):    CPR (Attempt to Resuscitate)     Medical Interventions (Patient has pulse or is breathing):    Full Support     Admit Date: 5/21/2025       Hospital Day: 14  Referring Provider: No ref. provider found      Patient personally seen and examined.  Complete chart including Consults, Notes, Operative Reports, Labs, Cardiology, and Radiology studies reviewed as able.        Subjective:  Ricardo Hugo is a 75 y.o. male for whom we were consulted for evaluation and treatment of end stage renal disease on hemodialysis Monday Wednesday Friday.  Presented with left lower leg pain. Recently had right lower extremity angiogram which has significantly improved the pain he was having in his right leg. Dr Zabala took patient to OR on 5/22 for angiogram and stenting of left external iliac artery.  Unfortunately pain in left leg has persisted and a left AKA had been considered.  Underwent angioplasty of left lower extremity on 5/30 and some blood flow was restored. Pain has since been improving. Has been tolerating inpatient HD well since arrival.     Today is awake and alert. No new complaint or overnight issues. Received unit PRBC yesterday. Likely will discharge to SNF later today        Allergies:  Ondansetron, Zofran [ondansetron hcl], Lortab [hydrocodone-acetaminophen], and Allopurinol    Home Meds:  Medications Prior to Admission   Medication Sig Dispense Refill Last Dose/Taking    ALPRAZolam (XANAX) 0.25 MG tablet Take 1 tablet by mouth Every Night.   Taking    aspirin (aspirin) 81 MG EC tablet Take 1  tablet by mouth Daily.   Taking    atorvastatin (LIPITOR) 10 MG tablet Take 1 tablet by mouth Daily. 90 tablet 3 Taking    calcitriol (ROCALTROL) 0.5 MCG capsule Take 1 capsule by mouth Daily. 90 capsule 2 Taking    carvedilol (COREG) 25 MG tablet Take 1 tablet by mouth 2 (Two) Times a Day With Meals.   Taking    cholestyramine light 4 g packet Take 1 packet by mouth Daily. 90 packet 1 Taking    cloNIDine (CATAPRES) 0.3 MG tablet Take 1 tablet by mouth 3 times a day.   Taking    clopidogrel (PLAVIX) 75 MG tablet Take 1 tablet by mouth Daily.   Taking    Copper Gluconate (Copper Caps) 2 MG capsule Take 2 mg by mouth Daily.   Taking    empagliflozin (Jardiance) 10 MG tablet tablet Take 1 tablet by mouth Daily.   Taking    fexofenadine (ALLEGRA) 180 MG tablet Take 1 tablet by mouth Daily.   Taking    furosemide (LASIX) 40 MG tablet Take 1 tablet by mouth Daily. 90 tablet 2 Taking    hydrALAZINE (APRESOLINE) 100 MG tablet Take 1 tablet by mouth 3 (Three) Times a Day. 100mg daily   Taking    isosorbide dinitrate (ISORDIL) 10 MG tablet Take 1 tablet by mouth 3 (Three) Times a Day. 270 tablet 2 Taking    levothyroxine (Synthroid) 100 MCG tablet Take 1 tablet by mouth Every Morning. 90 tablet 2 Taking    magnesium chloride ER 64 MG DR tablet Take 1 tablet by mouth Daily.   Taking    Melatonin 10 MG tablet Take 1 tablet by mouth Every Night.   Taking    mesalamine (APRISO) 0.375 g 24 hr capsule Take 4 capsules by mouth Daily. 360 capsule 1 Taking    Multiple Vitamins-Minerals (PRESERVISION/LUTEIN) capsule Take 1 capsule by mouth 2 (two) times a day.   Taking    NIFEdipine XL (PROCARDIA XL) 60 MG 24 hr tablet Take 1 tablet by mouth Daily.   Taking    omeprazole (priLOSEC) 20 MG capsule Take 1 capsule by mouth Daily.   Taking    sodium bicarbonate 650 MG tablet Take 1 tablet by mouth 5 (Five) Times a Day.   Taking    spironolactone (ALDACTONE) 25 MG tablet Take 1 tablet by mouth Daily.   Taking    tamsulosin (FLOMAX) 0.4 MG  capsule 24 hr capsule Take 1 capsule by mouth Every Night.   Taking    valsartan (DIOVAN) 320 MG tablet Take 1 tablet by mouth Daily.   Taking    vitamin D (ERGOCALCIFEROL) 1.25 MG (77100 UT) capsule capsule Take 1 capsule by mouth 1 (One) Time Per Week. Mondays   Taking    albuterol sulfate  (90 Base) MCG/ACT inhaler Inhale 2 puffs Every 6 (Six) Hours As Needed for Wheezing or Shortness of Air.       aspirin-acetaminophen-caffeine (EXCEDRIN MIGRAINE) 250-250-65 MG per tablet Take 1 tablet by mouth Every 6 (Six) Hours As Needed for Headache. (Patient taking differently: Take 3 tablets by mouth Every 6 (Six) Hours As Needed for Headache. Patient reports he takes 6-12 every day.)       Budeson-Glycopyrrol-Formoterol (Breztri Aerosphere) 160-9-4.8 MCG/ACT aerosol inhaler Inhale 2 puffs 2 (Two) Times a Day.       desoximetasone (TOPICORT) 0.25 % cream Apply 1 Application topically to the appropriate area as directed 2 (Two) Times a Day As Needed for Irritation. irritation       diphenhydrAMINE HCl, Sleep, 50 MG/30ML liquid Take 15 mL by mouth At Night As Needed (insomnia).       docusate sodium (COLACE) 100 MG capsule Take 1 capsule by mouth 3 (Three) Times a Day As Needed for Constipation.       fluticasone (FLONASE) 50 MCG/ACT nasal spray Administer 2 sprays into the nostril(s) as directed by provider Daily As Needed for Allergies.       montelukast (SINGULAIR) 10 MG tablet TAKE 1 TABLET EVERY NIGHT 90 tablet 3        Medicines:  Current Facility-Administered Medications   Medication Dose Route Frequency Provider Last Rate Last Admin    ALPRAZolam (XANAX) tablet 0.25 mg  0.25 mg Oral Nightly PRN BickingGil, DO   0.25 mg at 06/02/25 2055    aspirin EC tablet 81 mg  81 mg Oral Daily Bicking, Gil K, DO   81 mg at 06/03/25 0842    atorvastatin (LIPITOR) tablet 10 mg  10 mg Oral Daily Bicking, Gil FUCHS, DO   10 mg at 06/03/25 0842    sennosides-docusate (PERICOLACE) 8.6-50 MG per tablet 2 tablet  2  tablet Oral BID PRN Gil Pineda DO   2 tablet at 05/31/25 2011    And    polyethylene glycol (MIRALAX) packet 17 g  17 g Oral Daily PRN Gil Pineda DO        And    bisacodyl (DULCOLAX) EC tablet 5 mg  5 mg Oral Daily PRN BicGil donald, DO   5 mg at 05/28/25 0634    And    bisacodyl (DULCOLAX) suppository 10 mg  10 mg Rectal Daily PRN Gil Pineda DO        calcitriol (ROCALTROL) capsule 0.5 mcg  0.5 mcg Oral Daily BickingGil, DO   0.5 mcg at 06/03/25 0841    carvedilol (COREG) tablet 12.5 mg  12.5 mg Oral BID With Meals Garrett Comer MD   12.5 mg at 06/03/25 1718    clopidogrel (PLAVIX) tablet 75 mg  75 mg Oral Daily Gil Pineda, DO   75 mg at 06/03/25 0842    [Held by provider] cyclobenzaprine (FLEXERIL) tablet 5 mg  5 mg Oral TID PRN Gil Pineda, DO   5 mg at 06/03/25 0423    epoetin cecile-epbx (RETACRIT) 5,000 Units 2 mL injection  5,000 Units Subcutaneous Once per day on Monday Wednesday Friday Gil Pineda DO   5,000 Units at 06/02/25 1325    gabapentin (NEURONTIN) capsule 300 mg  300 mg Oral Nightly Gil Pineda DO   300 mg at 06/03/25 2053    heparin (porcine) injection 3,200 Units  3,200 Units Intracatheter PRN Gil Pineda DO   3,200 Units at 06/02/25 1248    heparin (porcine) injection 3,600 Units  3,600 Units Intravenous Once Gil Pineda DO        hydrALAZINE (APRESOLINE) injection 10 mg  10 mg Intravenous Q6H PRN Gil Pineda DO   10 mg at 05/22/25 0654    hydrALAZINE (APRESOLINE) tablet 50 mg  50 mg Oral TID Garrett Comer MD        ipratropium-albuterol (DUO-NEB) nebulizer solution 3 mL  3 mL Nebulization TID PRN Gil Pineda DO        iron polysaccharides (NIFEREX) capsule 150 mg  150 mg Oral Daily Garrett Comer MD   150 mg at 06/03/25 0843    isosorbide dinitrate (ISORDIL) tablet 10 mg  10 mg Oral TID - Nitrates Gil Pineda DO   10 mg at 06/03/25 1718    levothyroxine (SYNTHROID, LEVOTHROID)  tablet 100 mcg  100 mcg Oral Q AM BicGil donald DO   100 mcg at 06/04/25 0627    montelukast (SINGULAIR) tablet 10 mg  10 mg Oral Nightly BickingGil DO   10 mg at 06/03/25 2053    NIFEdipine XL (PROCARDIA XL) 24 hr tablet 60 mg  60 mg Oral Daily Garrett Comer MD        nitroglycerin (NITROSTAT) SL tablet 0.4 mg  0.4 mg Sublingual Q5 Min PRN Gil Pineda DO        oxyCODONE-acetaminophen (PERCOCET) 5-325 MG per tablet 1 tablet  1 tablet Oral Q6H PRN Elena Jon, APRSHERITA   1 tablet at 06/04/25 0420    pantoprazole (PROTONIX) EC tablet 40 mg  40 mg Oral Q AM BickingGil, DO   40 mg at 06/04/25 0628    prochlorperazine (COMPAZINE) injection 5 mg  5 mg Intravenous Q6H PRN BicGil donald DO   5 mg at 05/28/25 1752    Or    prochlorperazine (COMPAZINE) tablet 5 mg  5 mg Oral Q6H PRN BicGil donald DO        Or    prochlorperazine (COMPAZINE) suppository 25 mg  25 mg Rectal Q12H PRN Gil Pineda DO        promethazine (PHENERGAN) 12.5 mg in sodium chloride 0.9 % 50 mL  12.5 mg Intravenous Q6H PRN BicGil donald, DO        sodium chloride 0.9 % flush 10 mL  10 mL Intravenous PRN BickingGil, DO        sodium chloride 0.9 % flush 10 mL  10 mL Intravenous Q12H BickingGil, DO   10 mL at 06/04/25 0629    sodium chloride 0.9 % flush 10 mL  10 mL Intravenous PRN BickingGil, DO   10 mL at 06/04/25 0628    sodium chloride 0.9 % infusion 40 mL  40 mL Intravenous PRN BicGil donald, DO        spironolactone (ALDACTONE) tablet 25 mg  25 mg Oral Daily BickingGil, DO   25 mg at 06/03/25 0841    tamsulosin (FLOMAX) 24 hr capsule 0.4 mg  0.4 mg Oral Nightly Gil Pineda DO   0.4 mg at 06/03/25 2053    valsartan (DIOVAN) tablet 160 mg  160 mg Oral Daily Garrett Comer MD           Past Medical History:  Past Medical History:   Diagnosis Date    3-vessel CAD 08/11/2020    Allergic rhinitis     Anemia     Anxiety disorder 04/27/2020    Arthritis      Asymmetrical sensorineural hearing loss 06/28/2017    Atherosclerosis of native artery of both lower extremities with intermittent claudication 07/18/2019    Avascular necrosis of femoral head, left 07/11/2020    right hip after surgery    Carotid stenosis     Chronic mucoid otitis media     Chronic rhinitis     COPD (chronic obstructive pulmonary disease)     Coronary artery disease     HEART BYPASS 2004    Crohn's disease of large intestine with other complication 07/30/2020    Chronic diarrhea Colonoscopy July 2020 revealed mild patchy scattered hemosiderin staining with inflammation more so in rectosigmoid area.  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    Iron deficiency anemia 07/14/2020    Mixed hearing loss of left ear     PAD (peripheral artery disease) 08/11/2020    Perianal abscess     Pernicious anemia 08/17/2020    took shots but never diagnosed with b12 deficiency    Personal history of alcoholism 08/11/2020    quit drinking in 2013    Prostatic hypertrophy 08/11/2020    Sensorineural hearing loss     Sepsis with acute renal failure 09/15/2020    Shortness of breath 05/27/2021    Tinnitus     Ventricular tachycardia, nonsustained 07/14/2020    Weight loss 07/11/2020       Past Surgical History:  Past Surgical History:   Procedure Laterality Date    AORTOGRAM Right 4/25/2025    Procedure: RIGHT LOWER EXTREMITY ANGIOGRAM, SHOCKWAVE LITHOTRIPSY, BALLOON ANGIOPLASTY, MYNX CLOSURE;  Surgeon: Gil Pineda DO;  Location: [facility] HYBRID OR;  Service: Vascular;  Laterality: Right;    AORTOGRAM Left 5/22/2025    Procedure: LEFT LOWER EXTREMITY  ANGIOGRAM, SHOCKWAVE LITHOTRIPSY, BALLOON ANGIOPLASTY, STENT PLACEMENT, MYNX CLOSURE;  Surgeon: Blayne Zabala MD;  Location: [facility] HYBRID OR;  Service: Vascular;  Laterality: Left;    AORTOGRAM Right 5/30/2025    Procedure: AORTOILIAC ANGIOGRAM WITH LEFT LOWER EXTREMITY ANGIOGRAM;  Surgeon: Gil Pineda DO;  Location: [facility] HYBRID OR;  Service: Vascular;  Laterality: Right;    ARTERY SURGERY  2021    right side on neck    CAROTID ENDARTERECTOMY Right 05/10/2021    Procedure: RIGHT CAROTID ENDARTERECTOMY WITH EEG;  Surgeon: Gil Pineda DO;  Location: [facility] HYBRID OR 12;  Service: Vascular;  Laterality: Right;    COLONOSCOPY N/A 07/02/2020    Procedure: COLONOSCOPY WITH ANESTHESIA;  Surgeon: Adrien Brewster MD;  Location: [facility] ENDOSCOPY;  Service: Gastroenterology;  Laterality: N/A;  pre op: diarrhea  post op: polyps  PCP: Joe Velasco MD    COLONOSCOPY N/A 10/13/2020    Procedure: COLONOSCOPY WITH ANESTHESIA;  Surgeon: Adrien Brewster MD;  Location: [facility] ENDOSCOPY;  Service: Gastroenterology;  Laterality: N/A;  Pre: Chronic Diarrhea, Crohn's  Post: AVM  Dr. Neftali Velasco  CO2 Inflation Used    COLONOSCOPY N/A 12/08/2023    Procedure: COLONOSCOPY WITH ANESTHESIA;  Surgeon: Adrien Brewster MD;  Location: [facility] ENDOSCOPY;  Service: Gastroenterology;  Laterality: N/A;  pre chrone's disease  post sub optimal prep, polyp, chrone's      CORONARY ARTERY BYPASS GRAFT  2003    x3    ENDOSCOPY N/A 11/02/2021    Procedure: ESOPHAGOGASTRODUODENOSCOPY WITH ANESTHESIA;  Surgeon: Bridger Bell MD;  Location: [facility] ENDOSCOPY;  Service: Gastroenterology;  Laterality: N/A;  pre anemia;gi bleed  post  gi bleed;schatski ring  Dr. ERIC Velasco    ENDOSCOPY N/A 10/10/2023    Procedure: ESOPHAGOGASTRODUODENOSCOPY WITH ANESTHESIA;  Surgeon: Adrien Brewster MD;  Location: [facility] ENDOSCOPY;  Service: Gastroenterology;  Laterality: N/A;  preop; anemia  postop esophagitis ; R/O barretts   PCP  Randall Beata    EYE SURGERY Bilateral     catorac    INCISION AND DRAINAGE PERIRECTAL ABSCESS N/A 2017    Procedure: INCISION AND DRAINAGE OF JEET ANAL ABSCESS;  Surgeon: Lynette Smith MD;  Location:  [facility] OR;  Service:     INGUINAL HERNIA REPAIR Bilateral 2023    Procedure: INGUINAL HERNIA BILATERAL REPAIR LAPAROSCOPIC WITH DAVINCI ROBOT WITH MESH;  Surgeon: Tahira Rivera MD;  Location:  [facility] OR;  Service: Robotics - DaVinci;  Laterality: Bilateral;    INSERTION HEMODIALYSIS CATHETER N/A 2025    Procedure: HEMODIALYSIS CATHETER PLACEMENT;  Surgeon: Gil Pineda DO;  Location:  [facility] HYBRID OR;  Service: Vascular;  Laterality: N/A;    MYRINGOTOMY W/ TUBES Left 2017    06/10/2016    TONSILLECTOMY      TOTAL HIP ARTHROPLASTY Right        Family History  Family History   Problem Relation Age of Onset    Breast cancer Mother     Dementia Father     Glaucoma Father     No Known Problems Daughter     Colon polyps Neg Hx     Colon cancer Neg Hx        Social History  Social History     Socioeconomic History    Marital status:    Tobacco Use    Smoking status: Former     Current packs/day: 0.00     Average packs/day: 0.5 packs/day for 25.8 years (12.9 ttl pk-yrs)     Types: Cigarettes     Start date:      Quit date: 10/13/2013     Years since quittin.6     Passive exposure: Past    Smokeless tobacco: Never    Tobacco comments:     quit    Vaping Use    Vaping status: Former    Substances: Nicotine    Devices: Pre-filled or refillable cartridge   Substance and Sexual Activity    Alcohol use: Not Currently    Drug use: No    Sexual activity: Not Currently     Partners: Female       Review of Systems:  History obtained from chart review and the patient  General ROS: No fever or chills  Respiratory ROS: No cough, shortness of breath, wheezing  Cardiovascular ROS: No chest pain or palpitations  Gastrointestinal ROS: No abdominal pain or melena  Genito-Urinary  ROS: No dysuria or hematuria  Psych ROS: No anxiety and depression  14 point ROS reviewed with the patient and negative except as noted above and in the HPI unless unable to obtain.    Objective:  Patient Vitals for the past 24 hrs:   BP Temp Temp src Pulse Resp SpO2 Weight   06/04/25 0750 173/71 97.9 °F (36.6 °C) Oral 102 18 94 % --   06/04/25 0600 177/78 97.9 °F (36.6 °C) Oral 109 18 93 % 60.7 kg (133 lb 13.1 oz)   06/03/25 1943 123/41 98.4 °F (36.9 °C) Oral 74 16 94 % --   06/03/25 1634 140/62 97.8 °F (36.6 °C) Axillary 64 16 95 % --   06/03/25 1445 148/48 97.8 °F (36.6 °C) Oral 63 16 93 % --   06/03/25 1340 (!) 96/34 96.6 °F (35.9 °C) Axillary 59 16 97 % --   06/03/25 1324 (!) 98/38 98.5 °F (36.9 °C) Axillary 57 16 97 % --   06/03/25 1244 104/42 98 °F (36.7 °C) Axillary 60 16 96 % --   06/03/25 1119 (!) 114/39 98.3 °F (36.8 °C) Oral 56 16 97 % --       Intake/Output Summary (Last 24 hours) at 6/4/2025 0926  Last data filed at 6/4/2025 0600  Gross per 24 hour   Intake 310 ml   Output 750 ml   Net -440 ml     General: awake/alert   Chest:  clear to auscultation bilaterally without respiratory distress  CVS: regular rate and rhythm  Abdominal: soft, nontender, positive bowel sounds  Extremities: no cyanosis or edema  Skin: warm and dry without rash      Labs:  Results from last 7 days   Lab Units 06/04/25  0339 06/03/25  0429 06/02/25  0643   WBC 10*3/mm3 7.05 6.80 8.08   HEMOGLOBIN g/dL 8.7* 7.0* 7.4*   HEMATOCRIT % 27.3* 22.1* 23.1*   PLATELETS 10*3/mm3 146 120* 127*         Results from last 7 days   Lab Units 06/04/25  0339 06/03/25  0429 06/02/25  0643   SODIUM mmol/L 137 137 136   POTASSIUM mmol/L 4.5 4.2 4.6   CHLORIDE mmol/L 101 99 103   CO2 mmol/L 25.0 27.0 22.0   BUN mg/dL 34.9* 24.2* 53.6*   CREATININE mg/dL 3.45* 2.79* 4.48*   CALCIUM mg/dL 8.8 8.2* 8.5*   EGFR mL/min/1.73 17.5* 22.6* 12.8*   BILIRUBIN mg/dL 0.5  --   --    ALK PHOS U/L 192*  --   --    ALT (SGPT) U/L 9  --   --    AST (SGOT) U/L 33  --  "  --    GLUCOSE mg/dL 110* 109* 104*       Radiology:   Imaging Results (Last 72 Hours)       Procedure Component Value Units Date/Time    IR Angiogram Extremity [[id number]] Collected: 06/02/25 0651     Updated: 06/02/25 1534    Narrative:      Performed by Dr. Pineda. Please see procedure note.     This report was signed and finalized on 6/2/2025 3:31 PM by Dr. Gil Pineda MD.       FL C Arm During Surgery [[id number]] Collected: 06/02/25 0651     Updated: 06/02/25 1534    Narrative:      Performed by Dr. Pineda. Please see procedure note.     This report was signed and finalized on 6/2/2025 3:31 PM by Dr. Gil Pineda MD.               Culture:  No results found for: \"BLOODCX\", \"URINECX\", \"WOUNDCX\", \"MRSACX\", \"RESPCX\", \"STOOLCX\"      Assessment    End stage renal disease on HD MWF  Hypertension  Anemia of CKD--s/p unit PRBC on 6/03  Peripheral vascular disease--s/p angiogram on 5/22 and multiple balloon angioplasties in the left lower extremity on 5/30   Thrombocytopenia  Hypokalemia--improved  Hyponatremia--improved    Plan:   Dialysis today  OK for discharge from renal standpoint. Follow up will be via dialysis clinic      STERLING Sethi  6/4/2025  09:26 CDT    "

## 2025-06-04 NOTE — THERAPY TREATMENT NOTE
Acute Care - Physical Therapy Treatment Note  [facility]     Patient Name: Ricardo Hugo  : 1950  MRN: [id number]  Today's Date: 2025      Visit Dx:     ICD-10-CM ICD-9-CM   1. Arterial occlusion  I70.90 444.9   2. Dialysis patient  Z99.2 V45.11   3. Impaired mobility [Z74.09]  Z74.09 799.89   4. Severe protein-calorie malnutrition  E43 262   5. Thrombocytopenia  D69.6 287.5   6. Hyperkalemia  E87.5 276.7   7. Acute pain of left lower extremity  M79.605 729.5   8. Anemia, unspecified type  D64.9 285.9   9. Preop testing  Z01.818 V72.84   10. ESRD (end stage renal disease) on dialysis  N18.6 585.6    Z99.2 V45.11   11. Abnormal TSH  R79.89 790.6   12. History of pneumonia, current treatment with cefdinir  Z87.01 V12.61   13. Bradycardia  R00.1 427.89   14. Chronic diastolic (congestive) heart failure  I50.32 428.32     428.0   15. Persistent proteinuria  R80.1 791.0   16. Dermatitis associated with moisture  L30.8 692.89   17. Sleep difficulties  G47.9 780.50   18. Bilateral inguinal hernia without obstruction or gangrene, recurrence not specified  K40.20 550.92   19. Unilateral recurrent inguinal hernia without obstruction or gangrene  K40.91 550.91   20. Hypertrophy of inferior nasal turbinate  J34.3 478.0   21. Nasal septal deviation  J34.2 470   22. Anticoagulated  Z79.01 V58.61   23. Sensorineural hearing loss (SNHL), bilateral  H90.3 389.18   24. Eustachian tube dysfunction, bilateral  H69.93 381.81   25. Systolic murmur  R01.1 785.2   26. Mixed hyperlipidemia  E78.2 272.2   27. Perforation of left tympanic membrane  H72.92 384.20   28. CLL (chronic lymphocytic leukemia)  C91.10 204.10   29. Low iron   E61.1 280.9   30. Copper deficiency  E61.0 275.1   31. Anemia due to chronic kidney disease, on chronic dialysis  N18.6 585.6    D63.1 285.21    Z99.2 V45.11   32. CKD (chronic kidney disease) stage 5  N18.4 585.4   33. Diastolic dysfunction  I51.89 429.9   34. Carotid stenosis, right  I65.21  433.10   35. Stenosis of right carotid artery  I65.21 433.10   36. Bilateral carotid artery stenosis  I65.23 433.10     433.30   37. IHD (ischemic heart disease)  I25.9 414.9   38. Peripheral arterial disease  I73.9 443.9   39. Wellness examination  Z00.00 V70.0   40. Symptomatic anemia  D64.9 285.9   41. Chronic diarrhea  K52.9 787.91   42. Resistant hypertension  I1A.0 401.9   43. Essential hypertension  I10 401.9   44. Chronic rhinitis  J31.0 472.0     Patient Active Problem List   Diagnosis    Chronic rhinitis    Essential hypertension    Resistant hypertension    Chronic diarrhea    Symptomatic anemia    Wellness examination    Peripheral arterial disease    IHD (ischemic heart disease)    Carotid stenosis    Stenosis of right carotid artery    Carotid stenosis, right    Diastolic dysfunction    CKD (chronic kidney disease) stage 5    Anemia due to chronic kidney disease    Copper deficiency    Low iron     CLL (chronic lymphocytic leukemia)    Perforation of left tympanic membrane    Mixed hyperlipidemia    Systolic murmur    Eustachian tube dysfunction, bilateral    Sensorineural hearing loss (SNHL), bilateral    Anticoagulated    Nasal septal deviation    Hypertrophy of inferior nasal turbinate    Unilateral recurrent inguinal hernia without obstruction or gangrene    Bilateral inguinal hernia without obstruction or gangrene    Sleep difficulties    Dermatitis associated with moisture    Proteinuria    Chronic diastolic (congestive) heart failure    Bradycardia    History of pneumonia, current treatment with cefdinir    Abnormal TSH    ESRD (end stage renal disease) on dialysis    Preop testing    Anemia, multifactorial to include chronic kidney disease, iron deficiency and possible bleed    Acute pain of left lower extremity    Hyperkalemia    Arterial occlusion    Thrombocytopenia    Severe protein-calorie malnutrition    Chronic heart failure with preserved ejection fraction (HFpEF)     Past Medical  History:   Diagnosis Date    3-vessel CAD 08/11/2020    Allergic rhinitis     Anemia     Anxiety disorder 04/27/2020    Arthritis     Asymmetrical sensorineural hearing loss 06/28/2017    Atherosclerosis of native artery of both lower extremities with intermittent claudication 07/18/2019    Avascular necrosis of femoral head, left 07/11/2020    right hip after surgery    Carotid stenosis     Chronic mucoid otitis media     Chronic rhinitis     COPD (chronic obstructive pulmonary disease)     Coronary artery disease     HEART BYPASS 2004    Crohn's disease of large intestine with other complication 07/30/2020    Chronic diarrhea Colonoscopy July 2020 revealed mild patchy scattered hemosiderin staining with inflammation more so in rectosigmoid area.  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    Iron deficiency anemia 07/14/2020    Mixed hearing loss of left ear     PAD (peripheral artery disease) 08/11/2020    Perianal abscess     Pernicious anemia 08/17/2020    took shots but never diagnosed with b12 deficiency    Personal history of alcoholism 08/11/2020    quit drinking in 2013    Prostatic hypertrophy 08/11/2020    Sensorineural hearing loss     Sepsis with acute renal failure 09/15/2020    Shortness of breath 05/27/2021    Tinnitus     Ventricular tachycardia, nonsustained 07/14/2020    Weight loss 07/11/2020     Past Surgical History:   Procedure Laterality Date    AORTOGRAM Right 4/25/2025    Procedure: RIGHT LOWER EXTREMITY ANGIOGRAM, SHOCKWAVE LITHOTRIPSY, BALLOON ANGIOPLASTY, MYNX CLOSURE;  Surgeon: Gil Pineda DO;  Location: [facility] HYBRID OR;   Service: Vascular;  Laterality: Right;    AORTOGRAM Left 5/22/2025    Procedure: LEFT LOWER EXTREMITY ANGIOGRAM, SHOCKWAVE LITHOTRIPSY, BALLOON ANGIOPLASTY, STENT PLACEMENT, MYNX CLOSURE;  Surgeon: Blayne Zabala MD;  Location: [facility] HYBRID OR;  Service: Vascular;  Laterality: Left;    AORTOGRAM Right 5/30/2025    Procedure: AORTOILIAC ANGIOGRAM WITH LEFT LOWER EXTREMITY ANGIOGRAM;  Surgeon: Gil Pineda DO;  Location: [facility] HYBRID OR;  Service: Vascular;  Laterality: Right;    ARTERY SURGERY  2021    right side on neck    CAROTID ENDARTERECTOMY Right 05/10/2021    Procedure: RIGHT CAROTID ENDARTERECTOMY WITH EEG;  Surgeon: Gil Pineda DO;  Location: [facility] HYBRID OR 12;  Service: Vascular;  Laterality: Right;    COLONOSCOPY N/A 07/02/2020    Procedure: COLONOSCOPY WITH ANESTHESIA;  Surgeon: Adrien Brewster MD;  Location: [facility] ENDOSCOPY;  Service: Gastroenterology;  Laterality: N/A;  pre op: diarrhea  post op: polyps  PCP: Joe Velasco MD    COLONOSCOPY N/A 10/13/2020    Procedure: COLONOSCOPY WITH ANESTHESIA;  Surgeon: Adrien Brewster MD;  Location: [facility] ENDOSCOPY;  Service: Gastroenterology;  Laterality: N/A;  Pre: Chronic Diarrhea, Crohn's  Post: AVM  Dr. Neftali Velasco  CO2 Inflation Used    COLONOSCOPY N/A 12/08/2023    Procedure: COLONOSCOPY WITH ANESTHESIA;  Surgeon: Adrien Brewster MD;  Location: [facility] ENDOSCOPY;  Service: Gastroenterology;  Laterality: N/A;  pre chrone's disease  post sub optimal prep, polyp, chrone's      CORONARY ARTERY BYPASS GRAFT  2003    x3    ENDOSCOPY N/A 11/02/2021    Procedure: ESOPHAGOGASTRODUODENOSCOPY WITH ANESTHESIA;  Surgeon: Bridger Bell MD;  Location: [facility] ENDOSCOPY;  Service: Gastroenterology;  Laterality: N/A;  pre anemia;gi bleed  post  gi bleed;fabian ring  Dr. ERIC Velasco    ENDOSCOPY N/A 10/10/2023    Procedure: ESOPHAGOGASTRODUODENOSCOPY WITH ANESTHESIA;  Surgeon: Adrien Brewster MD;  Location: [facility] ENDOSCOPY;   Service: Gastroenterology;  Laterality: N/A;  preop; anemia  postop esophagitis ; R/O barretts   PCP Randall Beata    EYE SURGERY Bilateral     catorac    INCISION AND DRAINAGE PERIRECTAL ABSCESS N/A 03/03/2017    Procedure: INCISION AND DRAINAGE OF JEET ANAL ABSCESS;  Surgeon: Lynette Smith MD;  Location:  [facility] OR;  Service:     INGUINAL HERNIA REPAIR Bilateral 06/27/2023    Procedure: INGUINAL HERNIA BILATERAL REPAIR LAPAROSCOPIC WITH DAVINCI ROBOT WITH MESH;  Surgeon: Tahira Rivera MD;  Location:  [facility] OR;  Service: Robotics - DaVinci;  Laterality: Bilateral;    INSERTION HEMODIALYSIS CATHETER N/A 4/16/2025    Procedure: HEMODIALYSIS CATHETER PLACEMENT;  Surgeon: Gil Pineda DO;  Location:  [facility] HYBRID OR;  Service: Vascular;  Laterality: N/A;    MYRINGOTOMY W/ TUBES Left 04/17/2017    06/10/2016    TONSILLECTOMY      TOTAL HIP ARTHROPLASTY Right 2006     PT Assessment (Last 12 Hours)       PT Evaluation and Treatment       Row Name 06/04/25 0940          Physical Therapy Time and Intention    Subjective Information complains of;pain  -AE     Document Type therapy note (daily note)  -AE     Mode of Treatment physical therapy  -AE       Row Name 06/04/25 0940          General Information    Existing Precautions/Restrictions fall  -AE       Row Name 06/04/25 0940          Pain    Pretreatment Pain Rating 3/10  -AE     Posttreatment Pain Rating 7/10  -AE     Pain Location foot  -AE     Pain Side/Orientation bilateral  -AE     Response to Pain Interventions --  RN aware and giving meds soon  -AE       Row Name 06/04/25 0940          Bed Mobility    Comment, (Bed Mobility) in bathroom  -AE       Row Name 06/04/25 0940          Sit-Stand Transfer    Sit-Stand [place] (Transfers) minimum assist (75% patient effort);verbal cues  -AE       Row Name 06/04/25 0940          Gait/Stairs (Locomotion)    [place] Level (Gait) minimum assist (75% patient effort)  -AE     Assistive Device (Gait)  walker, front-wheeled  -AE     Distance in Feet (Gait) 15  -AE     Bilateral Gait Deviations forward flexed posture  -AE     Left Sided Gait Deviations heel strike decreased  -AE       Row Name 06/04/25 0940          Hip (Therapeutic Exercise)    Hip (Therapeutic Exercise) AROM (active range of motion)  -AE     Hip AROM (Therapeutic Exercise) aBduction;10 repetitions  -AE       Row Name 06/04/25 0940          Knee (Therapeutic Exercise)    Knee (Therapeutic Exercise) AROM (active range of motion)  -AE     Knee AROM (Therapeutic Exercise) heel slides;10 repetitions  -AE       Row Name 06/04/25 0940          Ankle (Therapeutic Exercise)    Ankle (Therapeutic Exercise) AROM (active range of motion)  -AE     Ankle AROM (Therapeutic Exercise) plantarflexion;dorsiflexion;10 repetitions  -AE       Row Name             Wound 05/21/25 2311 Bilateral sacral spine     Wound - Properties Group Placement Date: 05/21/25  -SB Placement Time: 2311  -SB Present on Original Admission: Y  -SB Side: Bilateral  -SB Location: sacral spine  -SB Primary Wound Type: --  -SB, nonblanchable discoloration     Retired Wound - Properties Group Placement Date: 05/21/25  -SB Placement Time: 2311  -SB Present on Original Admission: Y  -SB Side: Bilateral  -SB Location: sacral spine  -SB    Retired Wound - Properties Group Placement Date: 05/21/25  -SB Placement Time: 2311  -SB Present on Original Admission: Y  -SB Side: Bilateral  -SB Location: sacral spine  -SB    Retired Wound - Properties Group Date first assessed: 05/21/25  -SB Time first assessed: 2311  -SB Present on Original Admission: Y  -SB Side: Bilateral  -SB Location: sacral spine  -SB      Row Name             Wound 05/22/25 1126 Right anterior groin Surgical Closed Surgical Incision    Wound - Properties Group Placement Date: 05/22/25  -AC Placement Time: 1126  -AC Present on Original Admission: N  -AC Side: Right  -AC Orientation: anterior  -AC Location: groin  -AC Primary Wound  Type: Surgical  -AC Secondary Wound Type - Surgical: Closed Surgi  -AC Additional Comments: MYNX 2X2 TEGADERM  -AC    Retired Wound - Properties Group Placement Date: 05/22/25  -AC Placement Time: 1126  -AC Present on Original Admission: N  -AC Side: Right  -AC Orientation: anterior  -AC Location: groin  -AC Additional Comments: MYNX 2X2 TEGADERM  -AC    Retired Wound - Properties Group Placement Date: 05/22/25  -AC Placement Time: 1126  -AC Present on Original Admission: N  -AC Side: Right  -AC Orientation: anterior  -AC Location: groin  -AC Additional Comments: MYNX 2X2 TEGADERM  -AC    Retired Wound - Properties Group Date first assessed: 05/22/25  -AC Time first assessed: 1126  -AC Present on Original Admission: N  -AC Side: Right  -AC Location: groin  -AC Additional Comments: MYNX 2X2 TEGADERM  -AC      Row Name             Wound 05/22/25 1301 Left anterior ankle Surgical Closed Surgical Incision    Wound - Properties Group Placement Date: 05/22/25  -AC Placement Time: 1301  -AC Present on Original Admission: N  -AC Side: Left  -AC Orientation: anterior  -AC Location: ankle  -AC Primary Wound Type: Surgical  -AC Secondary Wound Type - Surgical: Closed Surgi  -AC Additional Comments: 2x2 tegaderm  -AC    Retired Wound - Properties Group Placement Date: 05/22/25  -AC Placement Time: 1301  -AC Present on Original Admission: N  -AC Side: Left  -AC Orientation: anterior  -AC Location: ankle  -AC Additional Comments: 2x2 tegaderm  -AC    Retired Wound - Properties Group Placement Date: 05/22/25  -AC Placement Time: 1301  -AC Present on Original Admission: N  -AC Side: Left  -AC Orientation: anterior  -AC Location: ankle  -AC Additional Comments: 2x2 tegaderm  -AC    Retired Wound - Properties Group Date first assessed: 05/22/25  -AC Time first assessed: 1301  -AC Present on Original Admission: N  -AC Side: Left  -AC Location: ankle  -AC Additional Comments: 2x2 tegaderm  -AC      Row Name             Wound 05/30/25  1509 Right anterior groin Surgical    Wound - Properties Group Placement Date: 05/30/25  -AT Placement Time: 1509  -AT Side: Right  -AT Orientation: anterior  -AT Location: groin  -AT Primary Wound Type: Surgical  -AT    Retired Wound - Properties Group Placement Date: 05/30/25  -AT Placement Time: 1509  -AT Side: Right  -AT Orientation: anterior  -AT Location: groin  -AT    Retired Wound - Properties Group Placement Date: 05/30/25  -AT Placement Time: 1509  -AT Side: Right  -AT Orientation: anterior  -AT Location: groin  -AT    Retired Wound - Properties Group Date first assessed: 05/30/25  -AT Time first assessed: 1509  -AT Side: Right  -AT Location: groin  -AT      Row Name 06/04/25 0940          Positioning and Restraints    Pre-Treatment Position bathroom  -AE     Post Treatment Position chair  -AE     In Chair sitting;reclined;call light within reach;with family/caregiver  -AE               User Key  (r) = Recorded By, (t) = Taken By, (c) = Cosigned By      Initials Name Provider Type    AE Amira Chiu, JULIO Physical Therapist Assistant    AC Emilia Kline RN Registered Nurse    Dorian Cote RN Registered Nurse    AT RamosYumiko, RN Registered Nurse                    Physical Therapy Education       Title: PT OT SLP Therapies (Done)       Topic: Physical Therapy (Done)       Point: Mobility training (Done)       Learning Progress Summary            Patient Acceptance, E, VU by AE at 6/4/2025 1010    Comment: ex's    Acceptance, E, VU,NR by AD at 6/3/2025 1312    Comment: Poc, d/c plans, frequent moving and sitting up during the day, body mechanics, breathing techniques   Family Acceptance, E, VU,NR by AD at 6/3/2025 1312    Comment: Poc, d/c plans, frequent moving and sitting up during the day, body mechanics, breathing techniques                      Point: Home exercise program (Done)       Learning Progress Summary            Patient Acceptance, E, VU by AE at 6/4/2025 1010     Comment: ex's                      Point: Body mechanics (Done)       Learning Progress Summary            Patient Acceptance, E, VU,NR by AD at 6/3/2025 1312    Comment: Poc, d/c plans, frequent moving and sitting up during the day, body mechanics, breathing techniques   Family Acceptance, E, VU,NR by AD at 6/3/2025 1312    Comment: Poc, d/c plans, frequent moving and sitting up during the day, body mechanics, breathing techniques                      Point: Precautions (Done)       Learning Progress Summary            Patient Acceptance, E, VU,NR by AD at 6/3/2025 1312    Comment: Poc, d/c plans, frequent moving and sitting up during the day, body mechanics, breathing techniques   Family Acceptance, E, VU,NR by AD at 6/3/2025 1312    Comment: Poc, d/c plans, frequent moving and sitting up during the day, body mechanics, breathing techniques                                      User Key       Initials Effective Dates Name Provider Type Discipline    AE 02/03/23 -  Amira Chiu PTA Physical Therapist Assistant PT    AD 04/21/25 -  Yon Magaña, KELLY Student PT Student PT                  PT Recommendation and Plan     Progress: improving  Outcome Evaluation: Pt in bathroom and c/o B foot pain 9/10.RN aware and will give meds soon.Pt in bathroom.Pt was min x1 to stand and was assisted with clean up.Pt was min x 1 with RW to walk 15ft back to recliner.Pt had forward flexed posture and decreased step length.Pt was AROM for B LE ex's x 10 reps.Pt would benefit from continued PT at SNF.       Time Calculation:    PT Charges       Row Name 06/04/25 1010             Time Calculation    Start Time 0940  -AE      Stop Time 1003  -AE      Time Calculation (min) 23 min  -AE      PT Received On 06/04/25  -AE         Time Calculation- PT    Total Timed Code Minutes- PT 23 minute(s)  -AE         Timed Charges    35503 - PT Therapeutic Exercise Minutes 8  -AE      25749 - Gait Training Minutes  15  -AE         Total Minutes     Timed Charges Total Minutes 23  -AE       Total Minutes 23  -AE                User Key  (r) = Recorded By, (t) = Taken By, (c) = Cosigned By      Initials Name Provider Type    AE Amira Chiu PTA Physical Therapist Assistant                  Therapy Charges for Today       Code Description Service Date Service Provider Modifiers Qty    23978059907 HC GAIT TRAINING EA 15 MIN 6/4/2025 Amira Chiu PTA GP 1    25176952396 HC PT THER PROC EA 15 MIN 6/4/2025 Amira Chiu PTA GP 1            PT G-Codes  Outcome Measure Options: AM-PAC 6 Clicks Basic Mobility (PT)  AM-PAC 6 Clicks Score (PT): 15    Amira Chiu PTA  6/4/2025

## 2025-06-04 NOTE — CASE MANAGEMENT/SOCIAL WORK
Continued Stay Note   [facility]     Patient Name: Ricardo Hugo  MRN: [id number]  Today's Date: 6/4/2025    Admit Date: 5/21/2025    Plan: [facility]   Discharge Plan       Row Name 06/04/25 1154       Plan    Plan [facility]    Patient/Family in Agreement with Plan yes    Plan Comments [facility] has a bed available for pt. Will follow for d/c.                   Discharge Codes    No documentation.                 Expected Discharge Date and Time       Expected Discharge Date Expected Discharge Time    Jun 4, 2025               LINNETTE Ramos

## 2025-06-04 NOTE — PLAN OF CARE
Goal Outcome Evaluation:                 Patient alert and oriented x4. Ambulating in room with x2 standby assistance. Complaints of pain in bilateral feet. Dialysis completed and 1.5L drawn off. Right foot tear noted due to hitting on bedside table. Mepilex applied.

## 2025-06-04 NOTE — DISCHARGE SUMMARY
[facility] Medicine Services  DISCHARGE SUMMARY       Date of Admission: 5/21/2025  Date of Discharge:  6/5/2025  Primary Care Physician: Nathan Zimmer MD    Presenting Problem/History of Present Illness:      Final Discharge Diagnoses:  Active Hospital Problems    Diagnosis     **Arterial occlusion     Severe protein-calorie malnutrition     Chronic heart failure with preserved ejection fraction (HFpEF)     Thrombocytopenia     Acute pain of left lower extremity     ESRD (end stage renal disease) on dialysis     Abnormal TSH     Chronic diastolic (congestive) heart failure     Anticoagulated     Sensorineural hearing loss (SNHL), bilateral     Eustachian tube dysfunction, bilateral     Nasal septal deviation     Mixed hyperlipidemia     CLL (chronic lymphocytic leukemia)     Anemia due to chronic kidney disease     Diastolic dysfunction     Symptomatic anemia     Resistant hypertension     Peripheral arterial disease     IHD (ischemic heart disease)     Essential hypertension     Chronic rhinitis        Consults: Nutritionist . nephrology.  Vascular surgery    Procedures Performed:   PROCEDURE PERFORMED:   1.  Introduction of catheter/sheath of the aorta  2.  Contralateral cannulation of the left common iliac artery  3.  Left lower extremity angiogram with radiographic supervision and interpretation  4.  Crossing of a below-knee popliteal artery chronic total occlusion  5.  Balloon angioplasty of the proximal SFA and below-knee popliteal artery chronic total occlusion using a 4 x 40 mm Medtronic drug-coated balloon  6.  Selective cannulation of the peroneal artery and anterior tibial artery  7.  Balloon angioplasty of the proximal anterior tibial artery and entire peroneal tibial artery and tibioperoneal trunk using a 2-1.5 x 210 mm  and 2.5-2 x 210 mm Tammy cross balloons  8.  Mynx closure of the right common femoral artery    PROCEDURE PERFORMED:   Right common femoral  artery access under ultrasound guidance  Left posterior tibial artery access ultrasound guidance  Left lower extremity arteriogram with third order selection angiographic interpretation  PTA of the left SFA with a 3 x 60 Tammy cross balloon  Shockwave lithotripsy of the left external iliac artery with a 6 x 60 shockwave balloon  Left external iliac artery stenting with a 8 x 60 ever flex stent postdilated with a 7 x 60 EverCross balloon    Pertinent Test Results:   Results for orders placed during the hospital encounter of 01/10/25    Adult Transthoracic Echo Complete w/ Color, Spectral and Contrast if Necessary Per Protocol    Interpretation Summary    Left ventricular systolic function is normal. Left ventricular ejection fraction appears to be 56 - 60%.    Left ventricular wall thickness is consistent with mild septal asymmetric hypertrophy.    Left ventricular diastolic function is consistent with (grade II w/high LAP) pseudonormalization.    Normal right ventricular cavity size and systolic function noted.    The left atrial cavity is mild to moderately dilated.    The right atrial cavity is dilated.    Mild aortic valve stenosis is present.    Mild to moderate mitral valve regurgitation is present.    Moderate to severe tricuspid valve regurgitation is present.    Estimated right ventricular systolic pressure from tricuspid regurgitation is markedly elevated (>55 mmHg).      Imaging Results (All)       Procedure Component Value Units Date/Time    IR Angiogram Extremity [[id number]] Collected: 06/02/25 0651     Updated: 06/02/25 1534    Narrative:      Performed by Dr. Pineda. Please see procedure note.     This report was signed and finalized on 6/2/2025 3:31 PM by Dr. Gil Pineda MD.       FL C Arm During Surgery [[id number]] Collected: 06/02/25 0651     Updated: 06/02/25 1534    Narrative:      Performed by Dr. Pineda. Please see procedure note.     This report was signed and finalized on 6/2/2025 3:31  PM by Dr. Gil Pineda MD.       XR Chest 1 View [[id number]] Collected: 05/29/25 1529     Updated: 05/29/25 1533    Narrative:      EXAMINATION: XR CHEST 1 VW-  5/29/2025 3:29 PM 1 view     HISTORY: Shortness of breath; I70.90-Unspecified atherosclerosis;  Z99.2-Dependence on renal dialysis     COMPARISON: 4/20/2025     TECHNIQUE: A single frontal view of the chest was obtained.     FINDINGS:  Large bore central venous catheter on the RIGHT is redemonstrated.  Median sternotomy wires are again seen. Mild cardiomegaly and calcified  atherosclerotic vascular disease. Small pleural effusion on the RIGHT  with dense consolidation in the RIGHT midlung, new since 4/20/2025. This  may represent pneumonia but is nonspecific.       Impression:         1.  New airspace consolidation in the RIGHT midlung, concerning for  pneumonia.     2.  There is a small pleural effusion at the RIGHT lung base as well.     3.  Chronic changes as discussed above.           This report was signed and finalized on 5/29/2025 3:30 PM by Dr. Dmitri Aguilar MD.       IR Angiogram Extremity [[id number]] Collected: 05/23/25 0643     Updated: 05/23/25 1220    FL C Arm During Surgery [[id number]] Resulted: 05/22/25 1146     Updated: 05/22/25 1147    Narrative:      This procedure was auto-finalized with no dictation required.    US Venous Doppler Lower Extremity Left (duplex) [[id number]] Collected: 05/21/25 1817     Updated: 05/21/25 1820    Narrative:      History: Swelling       Impression:      Impression: There is no evidence of deep venous thrombosis or  superficial thrombophlebitis of the left lower extremity.     Comments: Left lower extremity venous duplex exam was performed using  color Doppler flow, Doppler wave form analysis, and grayscale imaging,  with and without compression. There is no evidence of deep venous  thrombosis of the common femoral, superficial femoral, popliteal,  posterior tibial, and peroneal veins. There is no  thrombus identified in  the saphenofemoral junction or the greater saphenous vein.         This report was signed and finalized on 5/21/2025 6:17 PM by Blayne Zabala.       US Arterial Doppler Lower Extremity Left [[id number]] Collected: 05/21/25 1720     Updated: 05/21/25 1725    Narrative:      History: PAD     Comments: Grayscale imaging as well as color flow duplex were used to  evaluate left lower extremity arterial system.     On the left, the peak systolic velocity in the common femoral artery  measured 149.3 cm/s. In the profunda femoris artery measured 124.6 cm/s.  In the proximal SFA measured 60.8 cm/s. In the mid SFA measured 0 cm/s.  In the distal SFA measured 82.3 cm/s. In the popliteal artery measured  11.9 cm/s. In the posterior tibial artery measured 18.5 cm/s. In the  anterior tibial artery measured 36.5 cm/s.       Impression:      There is an occlusion of the left mid SFA with distal  reconstitution in the distal SFA along with diminished waveforms beyond  the level of occlusion. No flow visualized in the left DP. There is  monophasic waveforms in the left common femoral artery which may  indicate more proximal disease.     This report was signed and finalized on 5/21/2025 5:22 PM by Blayne Zabala.             LAB RESULTS:      Lab 06/05/25 0423 06/04/25 0339 06/03/25 0429 06/02/25  0643 06/01/25  0304   WBC 7.65 7.05 6.80 8.08 9.21   HEMOGLOBIN 9.7* 8.7* 7.0* 7.4* 7.6*   HEMATOCRIT 29.6* 27.3* 22.1* 23.1* 24.2*   PLATELETS 151 146 120* 127* 120*   NEUTROS ABS 5.47 4.91  --   --   --    IMMATURE GRANS (ABS) 0.11* 0.12*  --   --   --    LYMPHS ABS 1.08 1.01  --   --   --    MONOS ABS 0.65 0.56  --   --   --    EOS ABS 0.28 0.39  --   --   --    MCV 95.8 98.2* 100.0* 101.3* 99.2*         Lab 06/05/25  0423 06/04/25  0339 06/03/25  0429 06/02/25  0643 06/01/25  0304 05/31/25  0431 05/30/25  1443   SODIUM 138 137 137 136 137   < >  --    SODIUM, ARTERIAL  --   --   --   --   --   --  137    POTASSIUM 4.3 4.5 4.2 4.6 4.5   < >  --    CHLORIDE 98 101 99 103 101   < >  --    CO2 28.0 25.0 27.0 22.0 20.0*   < >  --    ANION GAP 12.0 11.0 11.0 11.0 16.0*   < >  --    BUN 20.0 34.9* 24.2* 53.6* 41.9*   < >  --    CREATININE 2.19* 3.45* 2.79* 4.48* 3.68*   < >  --    EGFR 30.3* 17.5* 22.6* 12.8* 16.2*   < >  --    GLUCOSE 101* 110* 109* 104* 98   < >  --    CALCIUM 9.0 8.8 8.2* 8.5* 8.5*   < >  --    IONIZED CALCIUM  --   --   --   --   --   --  4.45*    < > = values in this interval not displayed.         Lab 06/05/25  0423 06/04/25  0339   TOTAL PROTEIN 5.9* 5.6*   ALBUMIN 3.1* 3.0*   GLOBULIN 2.8 2.6   ALT (SGPT) 8 9   AST (SGOT) 27 33   BILIRUBIN 0.5 0.5   ALK PHOS 197* 192*                 Lab 06/03/25  1119 05/29/25  1501   ABO TYPING O O   RH TYPING Negative Negative   ANTIBODY SCREEN Negative Negative         Lab 05/30/25  1443   PH, ARTERIAL 7.505*   PCO2, ARTERIAL 35.7   PO2 ART 61.6*   O2 SATURATION ART 94.0   HCO3 ART 28.1*   BASE EXCESS ART 4.7*   CARBOXYHEMOGLOBIN 2.1     Brief Urine Lab Results  (Last result in the past 365 days)        Color   Clarity   Blood   Leuk Est   Nitrite   Protein   CREAT   Urine HCG        04/20/25 1509             22.8               Microbiology Results (last 10 days)       ** No results found for the last 240 hours. **        History of Present Illness  Patient is a 75-year-old  male with past medical history significant for end-stage renal disease on hemodialysis every Monday, Wednesday, and Friday, history of known peripheral arterial disease followed by Dr. Pineda of vascular surgery in the outpatient setting, coronary artery disease, hypertension, and chronic lymphocytic leukemia.  Patient recently had intervention completed by Dr. Pineda of vascular surgery on his right lower extremity including balloon angioplasty; this was completed on 4/26/2025.  Patient also had a 1 day hospital stay on 4/28-4/29 for symptomatic anemia in which she was  administered 1 unit of packed red blood cells and also given treatment with Retacrit.  Patient presented today to our facility given left lower extremity pain, which per his report has been getting worse for over 1 week now.  He started noticing some discoloration near his toes on the left foot.  Patient reports that he has been taking a lot of Excedrin in the outpatient setting to try to manage the pain symptoms, to no avail.  He does report taking aspirin in addition to Plavix in the outpatient setting, and remaining compliant with these medications.  In the emergency department today workup has been concerning for a arterial occlusion involving the left lower extremity, patient will be admitted to the hospitalist service with consultation by vascular surgery.  Patient does report that he has been compliant with dialysis, in fact he did go to dialysis today.  He denies any chest pain or chest pressure.    Hospital Course:   Acute ischemic left lower extremity /left leg pain.  Vascular consult.  Aspirin . Plavix.  Status post surgery 5/22/2025-Right common femoral artery access under ultrasound guidance, left posterior tibial artery access ultrasound guidance, Left lower extremity arteriogram with third order selection angiographic interpretation, PTA of the left SFA with a 3 x 60 Tammy cross balloon, Shockwave lithotripsy of the left external iliac artery with a 6 x 60 shockwave balloon, Left external iliac artery stenting with a 8 x 60 ever flex stent postdilated with a 7 x 60 Ever Cross balloon  Status post 5/30/2025-Introduction of catheter/sheath of the aorta, Contralateral cannulation of the left common iliac artery, Left lower extremity angiogram with radiographic supervision, Crossing of a below-knee popliteal artery chronic total occlusion, Balloon angioplasty of the proximal SFA and below-knee popliteal artery chronic total occlusion using a 4 x 40 mm Medtronic drug-coated balloon, Selective cannulation of  the peroneal artery and anterior tibial artery, Balloon angioplasty of the proximal anterior tibial artery and entire peroneal tibial artery and tibioperoneal trunk using a 2-1.5 x 210 mm  and 2.5-2 x 210 mm Tammy cross balloons, Mynx closure of the right common femoral artery  Vascular recommendation follow-up in 2 weeks.     Anemia.  Status post 3 units of blood transfusion.  Hemoglobin increased...   Niferex . Procrit.  No sign of acute bleed.     Thrombocytopenia.  Platelet count normalized.     Shortness of breath/Rales/ pneumonia.  COPD.  Not exacerbation.  DuoNebs.  Singulair.  Hypothyroidism.  Synthroid.  Incentive spirometer.  Zosyn.  Chest x-ray-New airspace consolidation in the RIGHT midlung- concerning for pneumonia, small pleural effusion at the RIGHT lung base, Chronic changes.  Patient is on room air.       CAD/hypertension/hyperlipidemia/CHF.  Aspirin . Lipitor.  Coreg .   Plavix.  Procardia.  Hydralazine. Isordil . Aldactone.  Diovan . Hydralazine as needed.  Nitro as needed.  Echocardiogram 1/11/2025- 56 - 60%,  mild septal asymmetric hypertrophy, diastolic function is consistent with (grade II w/high LAP) pseudonormalization, Normal right ventricular cavity size and systolic function, left atrial cavity is mild to moderately dilated, right atrial cavity is dilated, Mild aortic valve stenosis, Mild to moderate mitral valve regurgitation, Moderate to severe tricuspid valve regurgitation,  tricuspid regurgitation is markedly elevated (>55 mmHg).     End-stage renal disease.  Dialysis patient.  Calcitriol . Creatinine decreased.  Dialysis Monday Wednesday Friday.     Hyponatremia.  Sodium normalized     Hypokalemia.  Resolved. Magnesium-normal.     Reflux . Protonix.  Compazine as needed.  Phenergan as needed.     Prostate hypertrophy.  Flomax.     Anxiety/depression.  Xanax as needed.     Pain.  Morphine as needed.  Percocet as needed.  Neurontin at Night.  Flexeril as needed.     Nutrition . Diabetic  "diet.   Boost supplement.  Fortified pudding.     Deconditioning.  PT consult.  Patient gets around a walker at baseline.     Patient lives by himself.   Patient will need to go to rehab.   consult.  Vital signs blood pressure is high, patient just restarted his blood pressure medication today we will give and recheck discussed with nursing staff after dialysis today.  Discharge of blood pressure is better.  Follow-up with rehab physician as soon as able.  Follow-up with dialysis outpatient Monday Wednesday and Friday.  Follow-up with vascular in 2 weeks     Physical Exam on Discharge:  /69   Pulse 68   Temp 98.1 °F (36.7 °C) (Oral)   Resp 16   Ht 182.9 cm (72\")   Wt 58.7 kg (129 lb 6.4 oz)   SpO2 96%   BMI 17.55 kg/m² discussed with nursing to recheck blood pressure before discharge  Physical Exam  Vitals and nursing note reviewed.   Constitutional:       Comments: Advanced age.  Cachectic.  Chronically ill.   HENT:      Head: Normocephalic.   Eyes:      Conjunctiva/sclera: Conjunctivae normal.      Pupils: Pupils are equal, round, and reactive to light.   Cardiovascular:      Rate and Rhythm: Normal rate and regular rhythm.      Heart sounds: Normal heart sounds.   Pulmonary:      Effort: No respiratory distress.      Comments: Diminished breath sound bilateral, clear, on room air.  Abdominal:      General: Bowel sounds are normal. There is no distension.      Palpations: Abdomen is soft.      Tenderness: There is no abdominal tenderness.   Musculoskeletal:         General: No swelling.      Cervical back: Neck supple.   Skin:     General: Skin is warm and dry.      Findings: No rash.   Neurological:      Mental Status: He is alert and oriented to person, place, and time.      Motor: Weakness present.      Coordination: Coordination abnormal.      Gait: Gait abnormal.   Psychiatric:         Mood and Affect: Mood normal.         Behavior: Behavior normal.         Thought Content: Thought " content normal.        Condition on Discharge: Stable.    Discharge Disposition: Discharge to rehab.  Skilled Nursing Facility ([facility])    Discharge Medications:     Discharge Medications        New Medications        Instructions Start Date   gabapentin 300 MG capsule  Commonly known as: NEURONTIN   300 mg, Oral, Nightly      iron polysaccharides 150 MG capsule  Commonly known as: NIFEREX   150 mg, Oral, Daily      naloxone 4 MG/0.1ML nasal spray  Commonly known as: NARCAN   Call 911. Don't prime. [place] in 1 nostril for overdose. Repeat in 2-3 minutes in other nostril if no or minimal breathing/responsiveness.      oxyCODONE-acetaminophen 5-325 MG per tablet  Commonly known as: PERCOCET   1 tablet, Oral, Every 6 Hours PRN             Changes to Medications        Instructions Start Date   ALPRAZolam 0.25 MG tablet  Commonly known as: XANAX  What changed:   when to take this  reasons to take this  Another medication with the same name was removed. Continue taking this medication, and follow the directions you see here.   0.25 mg, Oral, Nightly PRN      carvedilol 12.5 MG tablet  Commonly known as: COREG  What changed:   medication strength  how much to take   12.5 mg, Oral, 2 Times Daily With Meals      hydrALAZINE 100 MG tablet  Commonly known as: APRESOLINE  What changed: additional instructions   100 mg, Oral, 3 Times Daily      montelukast 10 MG tablet  Commonly known as: SINGULAIR  What changed: how to take this   10 mg, Nightly      NIFEdipine CC 60 MG 24 hr tablet  Commonly known as: ADALAT CC  What changed: how much to take   120 mg, Oral, Daily             Continue These Medications        Instructions Start Date   albuterol sulfate  (90 Base) MCG/ACT inhaler  Commonly known as: PROVENTIL HFA;VENTOLIN HFA;PROAIR HFA   2 puffs, Every 6 Hours PRN      aspirin 81 MG EC tablet   81 mg, Daily      atorvastatin 10 MG tablet  Commonly known as: LIPITOR   10 mg, Oral, Daily      Breztri Aerosphere  160-9-4.8 MCG/ACT aerosol inhaler  Generic drug: Budeson-Glycopyrrol-Formoterol   2 puffs, Inhalation, 2 Times Daily      calcitriol 0.5 MCG capsule  Commonly known as: ROCALTROL   0.5 mcg, Oral, Daily      cloNIDine 0.3 MG tablet  Commonly known as: CATAPRES   0.3 mg, 3 times daily      clopidogrel 75 MG tablet  Commonly known as: PLAVIX   75 mg, Daily      desoximetasone 0.25 % cream  Commonly known as: TOPICORT   1 Application, Topical, 2 Times Daily PRN, irritation      docusate sodium 100 MG capsule  Commonly known as: COLACE   100 mg, 3 Times Daily PRN      fluticasone 50 MCG/ACT nasal spray  Commonly known as: FLONASE   2 sprays, Nasal, Daily PRN      isosorbide dinitrate 10 MG tablet  Commonly known as: ISORDIL   10 mg, Oral, 3 Times Daily (Nitrates)      Jardiance 10 MG tablet tablet  Generic drug: empagliflozin   10 mg, Daily      levothyroxine 100 MCG tablet  Commonly known as: Synthroid   100 mcg, Oral, Every Early Morning      magnesium chloride ER 64 MG DR tablet   64 mg, Daily      omeprazole 20 MG capsule  Commonly known as: priLOSEC   20 mg, Daily      PreserVision/Lutein capsule   1 capsule, 2 times daily      spironolactone 25 MG tablet  Commonly known as: ALDACTONE   25 mg, Daily      tamsulosin 0.4 MG capsule 24 hr capsule  Commonly known as: FLOMAX   1 capsule, Nightly      valsartan 320 MG tablet  Commonly known as: DIOVAN   320 mg, Oral, Daily      vitamin D 1.25 MG (77148 UT) capsule capsule  Commonly known as: ERGOCALCIFEROL   50,000 Units, Oral, Weekly, Mondays             Stop These Medications      aspirin-acetaminophen-caffeine 250-250-65 MG per tablet  Commonly known as: EXCEDRIN MIGRAINE     cholestyramine light 4 g packet     Copper Caps 2 MG capsule  Generic drug: Copper Gluconate     diphenhydrAMINE HCl (Sleep) 50 MG/30ML liquid     fexofenadine 180 MG tablet  Commonly known as: ALLEGRA     furosemide 40 MG tablet  Commonly known as: LASIX     Melatonin 10 MG tablet     mesalamine  0.375 g 24 hr capsule  Commonly known as: APRISO     sodium bicarbonate 650 MG tablet                  Discharge Diet:   Diet Instructions       Advance Diet As Tolerated -Target Diet: Diabetic diet.      Target Diet: Diabetic diet.            Activity at Discharge:   Activity Instructions       Activity as Tolerated              Follow-up Appointments:   Future Appointments   Date Time Provider Department Center   6/10/2025 10:00 AM [facility] US [facility] CART 3 [facility] PAD [facility] P[facility]   6/23/2025 11:00 AM Elena Jon APRN [facility] PAD   7/3/2025  2:00 PM Adrien Brewster MD [facility] PAD PAD   8/27/2025 10:15 AM Rip Mcguire DO [facility] CD PAD PAD   11/4/2025 10:00 AM [facility] US [facility]IVAS CART 1 BH PAD [facility] PAD   11/4/2025 11:00 AM PAD US NIVAS CART 1 BH PAD [facility] PAD   11/6/2025 10:15 AM Elena Jon APRN [facility] VS PAD PAD       Follow-up with rehab physician as soon as able.  Follow with dialysis Monday Wednesday and Friday.  Follow with vascular surgery in 2 weeks.    Electronically signed by Garrett Comer MD, 06/05/25, 14:35 CDT.    Time: Greater than 30 minutes.

## 2025-06-04 NOTE — PROGRESS NOTES
[facility] Medicine Services  INPATIENT PROGRESS NOTE    Patient Name: Ricardo Hugo  Date of Admission: 5/21/2025  Today's Date: 06/04/25  Length of Stay: 14  Primary Care Physician: Nathan Zimmer MD    Subjective   Chief Complaint: Peripheral vascular disease/pain/dialysis/anemia/thrombocytopenia   HPI   Blood pressure on the high side slightly tacky, put patient back on his blood pressure medication, dialysis today.  Patient did not want to leave today.  Possible discharge tomorrow discussed with patient, plan for dialysis today.  Patient is on room air.  Creatinine increased.  Hemoglobin increased after 1 unit of blood yesterday.    Review of Systems   Constitutional:  Positive for activity change, appetite change and fatigue. Negative for chills and fever.   HENT:  Negative for hearing loss, nosebleeds, tinnitus and trouble swallowing.    Eyes:  Negative for visual disturbance.   Respiratory:  Negative for cough, chest tightness, shortness of breath and wheezing.    Cardiovascular:  Negative for chest pain, palpitations and leg swelling.   Gastrointestinal:  Negative for abdominal distention, abdominal pain, blood in stool, constipation, diarrhea, nausea and vomiting.   Endocrine: Negative for cold intolerance, heat intolerance, polydipsia, polyphagia and polyuria.   Genitourinary:  Negative for decreased urine volume, difficulty urinating, dysuria, flank pain, frequency and hematuria.   Musculoskeletal:  Positive for arthralgias, gait problem and myalgias. Negative for joint swelling.   Skin:  Negative for rash.   Allergic/Immunologic: Negative for immunocompromised state.   Neurological:  Positive for weakness. Negative for dizziness, syncope, light-headedness and headaches.   Hematological:  Negative for adenopathy. Does not bruise/bleed easily.   Psychiatric/Behavioral:  Negative for confusion and sleep disturbance. The patient is not nervous/anxious.   All  pertinent negatives and positives are as above. All other systems have been reviewed and are negative unless otherwise stated.     Objective    Temp:  [96.6 °F (35.9 °C)-98.5 °F (36.9 °C)] 97.9 °F (36.6 °C)  Heart Rate:  [] 102  Resp:  [16-18] 18  BP: ()/(34-78) 173/71  Physical Exam  Vitals and nursing note reviewed.   Constitutional:       Comments: Advanced age.  Cachectic.  Chronically ill.   HENT:      Head: Normocephalic.   Eyes:      Conjunctiva/sclera: Conjunctivae normal.      Pupils: Pupils are equal, round, and reactive to light.   Cardiovascular:      Rate and Rhythm: Normal rate and regular rhythm.      Heart sounds: Normal heart sounds.   Pulmonary:      Effort: No respiratory distress.      Comments: Diminished breath sound bilateral, clear, on room air.  Abdominal:      General: Bowel sounds are normal. There is no distension.      Palpations: Abdomen is soft.      Tenderness: There is no abdominal tenderness.   Musculoskeletal:         General: No swelling.      Cervical back: Neck supple.   Skin:     General: Skin is warm and dry.      Findings: No rash.   Neurological:      Mental Status: He is alert and oriented to person, place, and time.      Motor: Weakness present.      Coordination: Coordination abnormal.      Gait: Gait abnormal.   Psychiatric:         Mood and Affect: Mood normal.         Behavior: Behavior normal.         Thought Content: Thought content normal.       Results Review:  I have reviewed the labs, radiology results, and diagnostic studies.    Laboratory Data:   Results from last 7 days   Lab Units 06/04/25 0339 06/03/25 0429 06/02/25 0643   WBC 10*3/mm3 7.05 6.80 8.08   HEMOGLOBIN g/dL 8.7* 7.0* 7.4*   HEMATOCRIT % 27.3* 22.1* 23.1*   PLATELETS 10*3/mm3 146 120* 127*        Results from last 7 days   Lab Units 06/04/25  0339 06/03/25 0429 06/02/25  0643   SODIUM mmol/L 137 137 136   POTASSIUM mmol/L 4.5 4.2 4.6   CHLORIDE mmol/L 101 99 103   CO2 mmol/L 25.0  "27.0 22.0   BUN mg/dL 34.9* 24.2* 53.6*   CREATININE mg/dL 3.45* 2.79* 4.48*   CALCIUM mg/dL 8.8 8.2* 8.5*   BILIRUBIN mg/dL 0.5  --   --    ALK PHOS U/L 192*  --   --    ALT (SGPT) U/L 9  --   --    AST (SGOT) U/L 33  --   --    GLUCOSE mg/dL 110* 109* 104*       Culture Data:   No results found for: \"BLOODCX\", \"URINECX\", \"WOUNDCX\", \"MRSACX\", \"RESPCX\", \"STOOLCX\"    Radiology Data:   Imaging Results (Last 24 Hours)       ** No results found for the last 24 hours. **            I have reviewed the patient's current medications.     Assessment/Plan   Assessment  Active Hospital Problems    Diagnosis     **Arterial occlusion     Severe protein-calorie malnutrition     Chronic heart failure with preserved ejection fraction (HFpEF)     Thrombocytopenia     Acute pain of left lower extremity     ESRD (end stage renal disease) on dialysis     Abnormal TSH     Chronic diastolic (congestive) heart failure     Anticoagulated     Sensorineural hearing loss (SNHL), bilateral     Eustachian tube dysfunction, bilateral     Nasal septal deviation     Mixed hyperlipidemia     CLL (chronic lymphocytic leukemia)     Anemia due to chronic kidney disease     Diastolic dysfunction     Symptomatic anemia     Resistant hypertension     Peripheral arterial disease     IHD (ischemic heart disease)     Essential hypertension     Chronic rhinitis        Treatment Plan  Acute ischemic left lower extremity /left leg pain.  Vascular consult.  Aspirin . Plavix.  Status post surgery 5/22/2025-Right common femoral artery access under ultrasound guidance, left posterior tibial artery access ultrasound guidance, Left lower extremity arteriogram with third order selection angiographic interpretation, PTA of the left SFA with a 3 x 60 Tammy cross balloon, Shockwave lithotripsy of the left external iliac artery with a 6 x 60 shockwave balloon, Left external iliac artery stenting with a 8 x 60 ever flex stent postdilated with a 7 x 60 Ever Cross " balloon  Status post 5/30/2025-Introduction of catheter/sheath of the aorta, Contralateral cannulation of the left common iliac artery, Left lower extremity angiogram with radiographic supervision, Crossing of a below-knee popliteal artery chronic total occlusion, Balloon angioplasty of the proximal SFA and below-knee popliteal artery chronic total occlusion using a 4 x 40 mm Medtronic drug-coated balloon, Selective cannulation of the peroneal artery and anterior tibial artery, Balloon angioplasty of the proximal anterior tibial artery and entire peroneal tibial artery and tibioperoneal trunk using a 2-1.5 x 210 mm  and 2.5-2 x 210 mm Tammy cross balloons, Mynx closure of the right common femoral artery  Vascular recommendation follow-up in 2 weeks.     Anemia.  Status post 2 units of blood transfusion.  Hemoglobin decreased..   Niferex . Procrit.  Status post 1 unit of blood transfusion yesterday    Thrombocytopenia.  Platelet count normalized.        Shortness of breath/Rales/ pneumonia.  COPD.  Not exacerbation.  DuoNebs.  Singulair.  Hypothyroidism.  Synthroid.  Incentive spirometer.  Zosyn.  Chest x-ray-New airspace consolidation in the RIGHT midlung- concerning for pneumonia, small pleural effusion at the RIGHT lung base, Chronic changes.  Patient is on room air.       CAD/hypertension/hyperlipidemia/CHF.  Aspirin . Lipitor.  Coreg .   Plavix.  Procardia.  Hydralazine. Isordil . Aldactone.  Diovan . hydralazine as needed.  Nitro as needed.  Echocardiogram 1/11/2025- 56 - 60%,  mild septal asymmetric hypertrophy, diastolic function is consistent with (grade II w/high LAP) pseudonormalization, Normal right ventricular cavity size and systolic function, left atrial cavity is mild to moderately dilated, right atrial cavity is dilated, Mild aortic valve stenosis, Mild to moderate mitral valve regurgitation, Moderate to severe tricuspid valve regurgitation,  tricuspid regurgitation is markedly elevated (>55 mmHg).         End-stage renal disease.  Dialysis patient.  Calcitriol . Creatinine increased.  Dialysis Monday Wednesday Friday.     Hyponatremia.  Sodium normalized     Hypokalemia.  Resolved. Magnesium-normal.     Reflux . Protonix.  Compazine as needed.  Phenergan as needed.     Prostate hypertrophy.  Flomax.     Anxiety/depression.  Xanax as needed.     Pain.  Morphine as needed.  Percocet as needed.  Neurontin at Night.  Flexeril as needed.     Nutrition . Diabetic diet.   Boost supplement.  Fortified pudding.     Deconditioning.  PT consult.  Patient gets around a walker at baseline.     Patient lives by himself.  Patient will need to go to rehab.   consult.     Medical Decision Making  Number and Complexity of problems: Ischemic left leg/end-stage renal disease/hypokalemia/anemia/thrombocytopenia/CAD/CHF/COPD  Differential Diagnosis: None     Conditions and Status        Condition is unchanged.     [facility] Data  External documents reviewed: Previous note .  Cardiac tracing (EKG, telemetry) interpretation: No monitor  Radiology interpretation: Echo  Labs reviewed: Laboratory  Any tests that were considered but not ordered: Lab in a.m.     Decision rules/scores evaluated (example [id number], Wells, etc): None     Discussed with: Patient and daughter     Care Planning  Shared decision making: Patient and daughter  Code status and discussions: Full code     Disposition  Social Determinants of Health that impact treatment or disposition: From home  Patient will need rehab placement.  Patient did not want to go to rehab today probably tomorrow.        Electronically signed by Garrett Comer MD, 06/04/25, 10:03 CDT.

## 2025-06-04 NOTE — TELEPHONE ENCOUNTER
Caller: [facility] - [place], [place] - [address] - [phone] [address] [phone]     Relationship: Pharmacy    Best call back number: [phone]     Requested Prescriptions:     SYNTHROID 88MCG TABLET    Requested Prescriptions     Pending Prescriptions Disp Refills    levothyroxine (Synthroid) 100 MCG tablet 90 tablet 2     Sig: Take 1 tablet by mouth Every Morning.    desoximetasone (TOPICORT) 0.25 % cream       Sig: Apply 1 Application topically to the appropriate area as directed 2 (Two) Times a Day As Needed for Irritation. irritation        Pharmacy where request should be sent:      Last office visit with prescribing clinician: 4/14/2025   Last telemedicine visit with prescribing clinician: Visit date not found   Next office visit with prescribing clinician: Visit date not found     Additional details provided by patient: PATIENT IS REQUESTING 90 DAYS SUPPLY WITH 3 REFILLS    Does the patient have less than a 3 day supply:  [x] Yes  [] No    Would you like a call back once the refill request has been completed: [] Yes [x] No    If the office needs to give you a call back, can they leave a voicemail: [] Yes [x] No    Randi Mays   06/04/25 13:08 CDT

## 2025-06-04 NOTE — PLAN OF CARE
Goal Outcome Evaluation:  Plan of Care Reviewed With: patient        Progress: improving  Outcome Evaluation: Pt in bathroom and c/o B foot pain 9/10.RN aware and will give meds soon.Pt in bathroom.Pt was min x1 to stand and was assisted with clean up.Pt was min x 1 with RW to walk 15ft back to recliner.Pt had forward flexed posture and decreased step length.Pt was AROM for B LE ex's x 10 reps.Pt would benefit from continued PT at SNF.

## 2025-06-04 NOTE — PROGRESS NOTES
LOS: 14 days   Patient Care Team:  Nathan Zimmer MD as PCP - General (Internal Medicine)  Adrien Brewster MD as Consulting Physician (Gastroenterology)  Eleuterio Farley MD (Inactive) as Cardiologist (Cardiology)  Elena Jon APRN as Referring Physician (Vascular Surgery)  Bolivar Rueda MD as Consulting Physician (Nephrology)  Slava Tate APRN as Nurse Practitioner (Family Medicine)  New Omalley MD as Consulting Physician (Pulmonary Disease)  Becky Camacho APRN as Nurse Practitioner (Hematology and Oncology)  Gil Pineda DO as Consulting Physician (Vascular Surgery)    Chief Complaint: leg pain    Subjective     Patient seen/examined with no significant complaints of pain.  He is awake and alert today.  No family at bedside but I did speak with his daughter on the phone.  Blood pressure up today and some of his medications were resumed.       Objective     Vital Signs  Temp:  [96.6 °F (35.9 °C)-98.5 °F (36.9 °C)] 97.9 °F (36.6 °C)  Heart Rate:  [] 102  Resp:  [16-18] 18  BP: ()/(34-78) 173/71    Physical Exam  Vitals and nursing note reviewed.   Constitutional:       General: He is not in acute distress.     Appearance: Normal appearance. He is underweight. He is not diaphoretic.   HENT:      Head: Normocephalic. No right periorbital erythema or left periorbital erythema.      Nose: Nose normal.   Eyes:      General: No scleral icterus.     Pupils: Pupils are equal.   Cardiovascular:      Rate and Rhythm: Normal rate and regular rhythm.      Pulses:           Dorsalis pedis pulses are detected w/ Doppler on the left side.        Posterior tibial pulses are 0 on the left side.      Heart sounds: Normal heart sounds. No murmur heard.     Comments: Left: doppler DP(ankle)/peroneal  Right:doppler PT/peroneal    Pulmonary:      Effort: Pulmonary effort is normal. No respiratory distress.      Breath sounds: Normal breath sounds.   Abdominal:       General: Bowel sounds are normal. There is no distension.      Palpations: Abdomen is soft.      Tenderness: There is no abdominal tenderness. There is no guarding.   Musculoskeletal:         General: Swelling present. No tenderness. Normal range of motion.      Cervical back: Normal range of motion and neck supple.      Right lower leg: No edema.      Left lower leg: No edema.   Feet:      Right foot:      Skin integrity: Skin integrity normal.   Skin:     General: Skin is warm and dry.      Findings: No erythema or rash.   Neurological:      General: No focal deficit present.      Mental Status: He is alert and oriented to person, place, and time. Mental status is at baseline.      Cranial Nerves: No cranial nerve deficit.      Gait: Gait normal.   Psychiatric:         Attention and Perception: Attention normal.         Mood and Affect: Mood normal.         Behavior: Behavior normal.         Thought Content: Thought content normal.         Judgment: Judgment normal.         Laboratory Data:   Results from last 7 days   Lab Units 06/04/25  0339 06/03/25  0429 06/02/25  0643   WBC 10*3/mm3 7.05 6.80 8.08   HEMOGLOBIN g/dL 8.7* 7.0* 7.4*   HEMATOCRIT % 27.3* 22.1* 23.1*   PLATELETS 10*3/mm3 146 120* 127*       Results from last 7 days   Lab Units 06/04/25  0339 06/03/25  0429 06/02/25  0643   SODIUM mmol/L 137 137 136   POTASSIUM mmol/L 4.5 4.2 4.6   CHLORIDE mmol/L 101 99 103   CO2 mmol/L 25.0 27.0 22.0   BUN mg/dL 34.9* 24.2* 53.6*   CREATININE mg/dL 3.45* 2.79* 4.48*   CALCIUM mg/dL 8.8 8.2* 8.5*   BILIRUBIN mg/dL 0.5  --   --    ALK PHOS U/L 192*  --   --    ALT (SGPT) U/L 9  --   --    AST (SGOT) U/L 33  --   --    GLUCOSE mg/dL 110* 109* 104*                Medication Review: Reviewed    Assessment & Plan       Arterial occlusion    Chronic rhinitis    Essential hypertension    Resistant hypertension    Symptomatic anemia    Peripheral arterial disease    IHD (ischemic heart disease)    Diastolic  dysfunction    Anemia due to chronic kidney disease    CLL (chronic lymphocytic leukemia)    Mixed hyperlipidemia    Eustachian tube dysfunction, bilateral    Sensorineural hearing loss (SNHL), bilateral    Anticoagulated    Nasal septal deviation    Chronic diastolic (congestive) heart failure    Abnormal TSH    ESRD (end stage renal disease) on dialysis    Acute pain of left lower extremity    Thrombocytopenia    Severe protein-calorie malnutrition    Chronic heart failure with preserved ejection fraction (HFpEF)          Plan for disposition:  Ok to discharge when ok with primary team  Follow up in 2 weeks      STERLING Murray  Vascular Surgery  [phone]  06/04/25  11:02 CDT

## 2025-06-05 VITALS
DIASTOLIC BLOOD PRESSURE: 69 MMHG | BODY MASS INDEX: 17.53 KG/M2 | TEMPERATURE: 98.1 F | RESPIRATION RATE: 16 BRPM | WEIGHT: 129.4 LBS | HEART RATE: 68 BPM | HEIGHT: 72 IN | OXYGEN SATURATION: 96 % | SYSTOLIC BLOOD PRESSURE: 172 MMHG

## 2025-06-05 LAB
ALBUMIN SERPL-MCNC: 3.1 G/DL (ref 3.5–5.2)
ALBUMIN/GLOB SERPL: 1.1 G/DL
ALP SERPL-CCNC: 197 U/L (ref 39–117)
ALT SERPL W P-5'-P-CCNC: 8 U/L (ref 1–41)
ANION GAP SERPL CALCULATED.3IONS-SCNC: 12 MMOL/L (ref 5–15)
AST SERPL-CCNC: 27 U/L (ref 1–40)
BASOPHILS # BLD AUTO: 0.06 10*3/MM3 (ref 0–0.2)
BASOPHILS NFR BLD AUTO: 0.8 % (ref 0–1.5)
BILIRUB SERPL-MCNC: 0.5 MG/DL (ref 0–1.2)
BUN SERPL-MCNC: 20 MG/DL (ref 8–23)
BUN/CREAT SERPL: 9.1 (ref 7–25)
CALCIUM SPEC-SCNC: 9 MG/DL (ref 8.6–10.5)
CHLORIDE SERPL-SCNC: 98 MMOL/L (ref 98–107)
CO2 SERPL-SCNC: 28 MMOL/L (ref 22–29)
CREAT SERPL-MCNC: 2.19 MG/DL (ref 0.76–1.27)
DEPRECATED RDW RBC AUTO: 60.6 FL (ref 37–54)
EGFRCR SERPLBLD CKD-EPI 2021: 30.3 ML/MIN/1.73
EOSINOPHIL # BLD AUTO: 0.28 10*3/MM3 (ref 0–0.4)
EOSINOPHIL NFR BLD AUTO: 3.7 % (ref 0.3–6.2)
ERYTHROCYTE [DISTWIDTH] IN BLOOD BY AUTOMATED COUNT: 17.3 % (ref 12.3–15.4)
GLOBULIN UR ELPH-MCNC: 2.8 GM/DL
GLUCOSE SERPL-MCNC: 101 MG/DL (ref 65–99)
HCT VFR BLD AUTO: 29.6 % (ref 37.5–51)
HGB BLD-MCNC: 9.7 G/DL (ref 13–17.7)
IMM GRANULOCYTES # BLD AUTO: 0.11 10*3/MM3 (ref 0–0.05)
IMM GRANULOCYTES NFR BLD AUTO: 1.4 % (ref 0–0.5)
LYMPHOCYTES # BLD AUTO: 1.08 10*3/MM3 (ref 0.7–3.1)
LYMPHOCYTES NFR BLD AUTO: 14.1 % (ref 19.6–45.3)
MCH RBC QN AUTO: 31.4 PG (ref 26.6–33)
MCHC RBC AUTO-ENTMCNC: 32.8 G/DL (ref 31.5–35.7)
MCV RBC AUTO: 95.8 FL (ref 79–97)
MONOCYTES # BLD AUTO: 0.65 10*3/MM3 (ref 0.1–0.9)
MONOCYTES NFR BLD AUTO: 8.5 % (ref 5–12)
NEUTROPHILS NFR BLD AUTO: 5.47 10*3/MM3 (ref 1.7–7)
NEUTROPHILS NFR BLD AUTO: 71.5 % (ref 42.7–76)
NRBC BLD AUTO-RTO: 0 /100 WBC (ref 0–0.2)
PLATELET # BLD AUTO: 151 10*3/MM3 (ref 140–450)
PMV BLD AUTO: 9.8 FL (ref 6–12)
POTASSIUM SERPL-SCNC: 4.3 MMOL/L (ref 3.5–5.2)
PROT SERPL-MCNC: 5.9 G/DL (ref 6–8.5)
RBC # BLD AUTO: 3.09 10*6/MM3 (ref 4.14–5.8)
SODIUM SERPL-SCNC: 138 MMOL/L (ref 136–145)
WBC NRBC COR # BLD AUTO: 7.65 10*3/MM3 (ref 3.4–10.8)

## 2025-06-05 PROCEDURE — 85025 COMPLETE CBC W/AUTO DIFF WBC: CPT | Performed by: INTERNAL MEDICINE

## 2025-06-05 PROCEDURE — 80053 COMPREHEN METABOLIC PANEL: CPT | Performed by: INTERNAL MEDICINE

## 2025-06-05 RX ORDER — HYDRALAZINE HYDROCHLORIDE 50 MG/1
100 TABLET, FILM COATED ORAL 3 TIMES DAILY
Status: DISCONTINUED | OUTPATIENT
Start: 2025-06-05 | End: 2025-06-05 | Stop reason: HOSPADM

## 2025-06-05 RX ORDER — HYDRALAZINE HYDROCHLORIDE 100 MG/1
100 TABLET, FILM COATED ORAL 3 TIMES DAILY
Start: 2025-06-05

## 2025-06-05 RX ORDER — NIFEDIPINE 60 MG/1
120 TABLET, EXTENDED RELEASE ORAL DAILY
Status: DISCONTINUED | OUTPATIENT
Start: 2025-06-05 | End: 2025-06-05 | Stop reason: HOSPADM

## 2025-06-05 RX ORDER — OXYCODONE AND ACETAMINOPHEN 7.5; 325 MG/1; MG/1
1 TABLET ORAL ONCE AS NEEDED
Refills: 0 | Status: COMPLETED | OUTPATIENT
Start: 2025-06-05 | End: 2025-06-05

## 2025-06-05 RX ORDER — CLONIDINE HYDROCHLORIDE 0.1 MG/1
0.3 TABLET ORAL EVERY 12 HOURS SCHEDULED
Status: DISCONTINUED | OUTPATIENT
Start: 2025-06-05 | End: 2025-06-05 | Stop reason: HOSPADM

## 2025-06-05 RX ORDER — CLONIDINE HYDROCHLORIDE 0.1 MG/1
0.2 TABLET ORAL EVERY 12 HOURS SCHEDULED
Status: DISCONTINUED | OUTPATIENT
Start: 2025-06-05 | End: 2025-06-05

## 2025-06-05 RX ORDER — VALSARTAN 80 MG/1
320 TABLET ORAL DAILY
Status: DISCONTINUED | OUTPATIENT
Start: 2025-06-05 | End: 2025-06-05 | Stop reason: HOSPADM

## 2025-06-05 RX ORDER — VALSARTAN 320 MG/1
320 TABLET ORAL DAILY
Start: 2025-06-05

## 2025-06-05 RX ORDER — CLONIDINE 0.2 MG/24H
1 PATCH, EXTENDED RELEASE TRANSDERMAL WEEKLY
Status: DISCONTINUED | OUTPATIENT
Start: 2025-06-05 | End: 2025-06-05

## 2025-06-05 RX ORDER — CLONIDINE HYDROCHLORIDE 0.1 MG/1
0.1 TABLET ORAL ONCE
Status: COMPLETED | OUTPATIENT
Start: 2025-06-05 | End: 2025-06-05

## 2025-06-05 RX ADMIN — VALSARTAN 320 MG: 80 TABLET, FILM COATED ORAL at 08:30

## 2025-06-05 RX ADMIN — Medication 10 ML: at 08:31

## 2025-06-05 RX ADMIN — NIFEDIPINE 120 MG: 60 TABLET, FILM COATED, EXTENDED RELEASE ORAL at 08:30

## 2025-06-05 RX ADMIN — OXYCODONE HYDROCHLORIDE AND ACETAMINOPHEN 1 TABLET: 5; 325 TABLET ORAL at 01:18

## 2025-06-05 RX ADMIN — OXYCODONE HYDROCHLORIDE AND ACETAMINOPHEN 1 TABLET: 7.5; 325 TABLET ORAL at 13:30

## 2025-06-05 RX ADMIN — LEVOTHYROXINE SODIUM 100 MCG: 0.1 TABLET ORAL at 06:55

## 2025-06-05 RX ADMIN — ASPIRIN 81 MG: 81 TABLET, COATED ORAL at 08:31

## 2025-06-05 RX ADMIN — Medication 150 MG: at 08:31

## 2025-06-05 RX ADMIN — PANTOPRAZOLE SODIUM 40 MG: 40 TABLET, DELAYED RELEASE ORAL at 04:37

## 2025-06-05 RX ADMIN — ISOSORBIDE DINITRATE 10 MG: 10 TABLET ORAL at 08:30

## 2025-06-05 RX ADMIN — PANTOPRAZOLE SODIUM 40 MG: 40 TABLET, DELAYED RELEASE ORAL at 04:38

## 2025-06-05 RX ADMIN — HYDRALAZINE HYDROCHLORIDE 100 MG: 50 TABLET ORAL at 08:30

## 2025-06-05 RX ADMIN — OXYCODONE HYDROCHLORIDE AND ACETAMINOPHEN 1 TABLET: 5; 325 TABLET ORAL at 08:31

## 2025-06-05 RX ADMIN — SPIRONOLACTONE 25 MG: 25 TABLET ORAL at 08:31

## 2025-06-05 RX ADMIN — ATORVASTATIN CALCIUM 10 MG: 10 TABLET, FILM COATED ORAL at 08:31

## 2025-06-05 RX ADMIN — CLONIDINE HYDROCHLORIDE 0.2 MG: 0.1 TABLET ORAL at 13:30

## 2025-06-05 RX ADMIN — CLOPIDOGREL BISULFATE 75 MG: 75 TABLET, FILM COATED ORAL at 08:30

## 2025-06-05 RX ADMIN — CALCITRIOL CAPSULES 0.25 MCG 0.5 MCG: 0.25 CAPSULE ORAL at 08:30

## 2025-06-05 RX ADMIN — CARVEDILOL 12.5 MG: 6.25 TABLET, FILM COATED ORAL at 08:31

## 2025-06-05 RX ADMIN — ISOSORBIDE DINITRATE 10 MG: 10 TABLET ORAL at 14:05

## 2025-06-05 RX ADMIN — CLONIDINE HYDROCHLORIDE 0.1 MG: 0.1 TABLET ORAL at 14:50

## 2025-06-05 RX ADMIN — LEVOTHYROXINE SODIUM 100 MCG: 0.1 TABLET ORAL at 04:38

## 2025-06-05 RX ADMIN — PANTOPRAZOLE SODIUM 40 MG: 40 TABLET, DELAYED RELEASE ORAL at 06:55

## 2025-06-05 NOTE — PROGRESS NOTES
Nephrology ([facility]) Progress Note      Patient:  Ricardo Hugo  YOB: 1950  Date of Service: 6/5/2025  MRN: [id number]   Acct: [id number]   Primary Care Physician: Nathan Zimmer MD  Advance Directive:   Code Status and Medical Interventions: CPR (Attempt to Resuscitate); Full Support   Ordered at: 05/21/25 1822     Code Status (Patient has no pulse and is not breathing):    CPR (Attempt to Resuscitate)     Medical Interventions (Patient has pulse or is breathing):    Full Support     Admit Date: 5/21/2025       Hospital Day: 15  Referring Provider: No ref. provider found      Patient personally seen and examined.  Complete chart including Consults, Notes, Operative Reports, Labs, Cardiology, and Radiology studies reviewed as able.        Subjective:  Ricardo Hugo is a 75 y.o. male for whom we were consulted for evaluation and treatment of end stage renal disease on hemodialysis Monday Wednesday Friday.  Presented with left lower leg pain. Recently had right lower extremity angiogram which has significantly improved the pain he was having in his right leg. Dr Zabala took patient to OR on 5/22 for angiogram and stenting of left external iliac artery.  Unfortunately pain in left leg has persisted and a left AKA had been considered.  Underwent angioplasty of left lower extremity on 5/30 and some blood flow was restored. Pain has since been improving. Has been tolerating inpatient HD well since arrival. Received unit PRBC on 6/03    Today is awake and alert. No new complaint or overnight issues. . Planning for discharge to SNF today        Allergies:  Ondansetron, Zofran [ondansetron hcl], Lortab [hydrocodone-acetaminophen], and Allopurinol    Home Meds:  Medications Prior to Admission   Medication Sig Dispense Refill Last Dose/Taking    ALPRAZolam (XANAX) 0.25 MG tablet Take 1 tablet by mouth Every Night.   Taking    aspirin (aspirin) 81 MG EC tablet Take 1 tablet  by mouth Daily.   Taking    atorvastatin (LIPITOR) 10 MG tablet Take 1 tablet by mouth Daily. 90 tablet 3 Taking    calcitriol (ROCALTROL) 0.5 MCG capsule Take 1 capsule by mouth Daily. 90 capsule 2 Taking    carvedilol (COREG) 25 MG tablet Take 1 tablet by mouth 2 (Two) Times a Day With Meals.   Taking    cholestyramine light 4 g packet Take 1 packet by mouth Daily. 90 packet 1 Taking    cloNIDine (CATAPRES) 0.3 MG tablet Take 1 tablet by mouth 3 times a day.   Taking    clopidogrel (PLAVIX) 75 MG tablet Take 1 tablet by mouth Daily.   Taking    Copper Gluconate (Copper Caps) 2 MG capsule Take 2 mg by mouth Daily.   Taking    empagliflozin (Jardiance) 10 MG tablet tablet Take 1 tablet by mouth Daily.   Taking    fexofenadine (ALLEGRA) 180 MG tablet Take 1 tablet by mouth Daily.   Taking    furosemide (LASIX) 40 MG tablet Take 1 tablet by mouth Daily. 90 tablet 2 Taking    hydrALAZINE (APRESOLINE) 100 MG tablet Take 1 tablet by mouth 3 (Three) Times a Day. 100mg daily   Taking    isosorbide dinitrate (ISORDIL) 10 MG tablet Take 1 tablet by mouth 3 (Three) Times a Day. 270 tablet 2 Taking    magnesium chloride ER 64 MG DR tablet Take 1 tablet by mouth Daily.   Taking    Melatonin 10 MG tablet Take 1 tablet by mouth Every Night.   Taking    mesalamine (APRISO) 0.375 g 24 hr capsule Take 4 capsules by mouth Daily. 360 capsule 1 Taking    Multiple Vitamins-Minerals (PRESERVISION/LUTEIN) capsule Take 1 capsule by mouth 2 (two) times a day.   Taking    NIFEdipine XL (PROCARDIA XL) 60 MG 24 hr tablet Take 1 tablet by mouth Daily.   Taking    omeprazole (priLOSEC) 20 MG capsule Take 1 capsule by mouth Daily.   Taking    sodium bicarbonate 650 MG tablet Take 1 tablet by mouth 5 (Five) Times a Day.   Taking    spironolactone (ALDACTONE) 25 MG tablet Take 1 tablet by mouth Daily.   Taking    tamsulosin (FLOMAX) 0.4 MG capsule 24 hr capsule Take 1 capsule by mouth Every Night.   Taking    valsartan (DIOVAN) 320 MG tablet Take 1  tablet by mouth Daily.   Taking    vitamin D (ERGOCALCIFEROL) 1.25 MG (26668 UT) capsule capsule Take 1 capsule by mouth 1 (One) Time Per Week. Mondays   Taking    albuterol sulfate  (90 Base) MCG/ACT inhaler Inhale 2 puffs Every 6 (Six) Hours As Needed for Wheezing or Shortness of Air.       aspirin-acetaminophen-caffeine (EXCEDRIN MIGRAINE) 250-250-65 MG per tablet Take 1 tablet by mouth Every 6 (Six) Hours As Needed for Headache. (Patient taking differently: Take 3 tablets by mouth Every 6 (Six) Hours As Needed for Headache. Patient reports he takes 6-12 every day.)       Budeson-Glycopyrrol-Formoterol (Breztri Aerosphere) 160-9-4.8 MCG/ACT aerosol inhaler Inhale 2 puffs 2 (Two) Times a Day.       desoximetasone (TOPICORT) 0.25 % cream Apply 1 Application topically to the appropriate area as directed 2 (Two) Times a Day As Needed for Irritation. irritation 60 g 0     diphenhydrAMINE HCl, Sleep, 50 MG/30ML liquid Take 15 mL by mouth At Night As Needed (insomnia).       docusate sodium (COLACE) 100 MG capsule Take 1 capsule by mouth 3 (Three) Times a Day As Needed for Constipation.       fluticasone (FLONASE) 50 MCG/ACT nasal spray Administer 2 sprays into the nostril(s) as directed by provider Daily As Needed for Allergies.       levothyroxine (Synthroid) 100 MCG tablet Take 1 tablet by mouth Every Morning. 90 tablet 2     montelukast (SINGULAIR) 10 MG tablet TAKE 1 TABLET EVERY NIGHT 90 tablet 3        Medicines:  Current Facility-Administered Medications   Medication Dose Route Frequency Provider Last Rate Last Admin    ALPRAZolam (XANAX) tablet 0.25 mg  0.25 mg Oral Nightly PRN BickingGil DO   0.25 mg at 06/02/25 2055    aspirin EC tablet 81 mg  81 mg Oral Daily BickingGil, DO   81 mg at 06/05/25 0831    atorvastatin (LIPITOR) tablet 10 mg  10 mg Oral Daily BickingGil DO   10 mg at 06/05/25 0831    sennosides-docusate (PERICOLACE) 8.6-50 MG per tablet 2 tablet  2 tablet Oral BID  PRN BicGil donald DO   2 tablet at 05/31/25 2011    And    polyethylene glycol (MIRALAX) packet 17 g  17 g Oral Daily PRN Gil Pineda DO        And    bisacodyl (DULCOLAX) EC tablet 5 mg  5 mg Oral Daily PRN BicGil donald, DO   5 mg at 05/28/25 0634    And    bisacodyl (DULCOLAX) suppository 10 mg  10 mg Rectal Daily PRN Gil Pineda DO        calcitriol (ROCALTROL) capsule 0.5 mcg  0.5 mcg Oral Daily Bicking, Gil FUCHS, DO   0.5 mcg at 06/05/25 0830    carvedilol (COREG) tablet 12.5 mg  12.5 mg Oral BID With Meals Garrett Comer MD   12.5 mg at 06/05/25 0831    clopidogrel (PLAVIX) tablet 75 mg  75 mg Oral Daily BicGil donald, DO   75 mg at 06/05/25 0830    [Held by provider] cyclobenzaprine (FLEXERIL) tablet 5 mg  5 mg Oral TID PRN Gil Pineda DO   5 mg at 06/03/25 0423    epoetin cecile-epbx (RETACRIT) 5,000 Units 2 mL injection  5,000 Units Subcutaneous Once per day on Monday Wednesday Friday Gil Pineda DO   5,000 Units at 06/04/25 1043    gabapentin (NEURONTIN) capsule 300 mg  300 mg Oral Nightly BicGil donald DO   300 mg at 06/04/25 2132    heparin (porcine) injection 3,200 Units  3,200 Units Intracatheter PRN Gil Pineda DO   3,200 Units at 06/04/25 1553    hydrALAZINE (APRESOLINE) injection 10 mg  10 mg Intravenous Q6H PRN Gil Pineda DO   10 mg at 05/22/25 0654    hydrALAZINE (APRESOLINE) tablet 100 mg  100 mg Oral TID Garrett Comer MD   100 mg at 06/05/25 0830    ipratropium-albuterol (DUO-NEB) nebulizer solution 3 mL  3 mL Nebulization TID PRN Gil Pineda DO        iron polysaccharides (NIFEREX) capsule 150 mg  150 mg Oral Daily Garrett Comer MD   150 mg at 06/05/25 0831    isosorbide dinitrate (ISORDIL) tablet 10 mg  10 mg Oral TID - Nitrates Gil Pineda DO   10 mg at 06/05/25 0830    levothyroxine (SYNTHROID, LEVOTHROID) tablet 100 mcg  100 mcg Oral Q AM Gil Pineda DO   100 mcg at 06/05/25 0655     montelukast (SINGULAIR) tablet 10 mg  10 mg Oral Nightly BickingGil, DO   10 mg at 06/04/25 2132    NIFEdipine XL (PROCARDIA XL) 24 hr tablet 120 mg  120 mg Oral Daily Garrett Comer MD   120 mg at 06/05/25 0830    nitroglycerin (NITROSTAT) SL tablet 0.4 mg  0.4 mg Sublingual Q5 Min PRN Gil Pineda, DO        oxyCODONE-acetaminophen (PERCOCET) 5-325 MG per tablet 1 tablet  1 tablet Oral Q6H PRN Elena Jon APRN   1 tablet at 06/05/25 0831    pantoprazole (PROTONIX) EC tablet 40 mg  40 mg Oral Q AM BicGil donald, DO   40 mg at 06/05/25 0655    prochlorperazine (COMPAZINE) injection 5 mg  5 mg Intravenous Q6H PRN BicGil donald, DO   5 mg at 05/28/25 1752    Or    prochlorperazine (COMPAZINE) tablet 5 mg  5 mg Oral Q6H PRN BickingGil, DO        Or    prochlorperazine (COMPAZINE) suppository 25 mg  25 mg Rectal Q12H PRN BicGil donald, DO        promethazine (PHENERGAN) 12.5 mg in sodium chloride 0.9 % 50 mL  12.5 mg Intravenous Q6H PRN BickingGil, DO        sodium chloride 0.9 % flush 10 mL  10 mL Intravenous PRN BickingGil, DO        sodium chloride 0.9 % flush 10 mL  10 mL Intravenous Q12H BickingGil, DO   10 mL at 06/05/25 0831    sodium chloride 0.9 % flush 10 mL  10 mL Intravenous PRN BickingGil, DO   10 mL at 06/04/25 0628    sodium chloride 0.9 % infusion 40 mL  40 mL Intravenous PRN BicGil donald, DO        spironolactone (ALDACTONE) tablet 25 mg  25 mg Oral Daily Bicking, Gil FUCHS, DO   25 mg at 06/05/25 0831    tamsulosin (FLOMAX) 24 hr capsule 0.4 mg  0.4 mg Oral Nightly BickingGil, DO   0.4 mg at 06/04/25 2132    valsartan (DIOVAN) tablet 320 mg  320 mg Oral Daily Garrett Comer MD   320 mg at 06/05/25 0830       Past Medical History:  Past Medical History:   Diagnosis Date    3-vessel CAD 08/11/2020    Allergic rhinitis     Anemia     Anxiety disorder 04/27/2020    Arthritis     Asymmetrical sensorineural hearing  loss 06/28/2017    Atherosclerosis of native artery of both lower extremities with intermittent claudication 07/18/2019    Avascular necrosis of femoral head, left 07/11/2020    right hip after surgery    Carotid stenosis     Chronic mucoid otitis media     Chronic rhinitis     COPD (chronic obstructive pulmonary disease)     Coronary artery disease     HEART BYPASS 2004    Crohn's disease of large intestine with other complication 07/30/2020    Chronic diarrhea Colonoscopy July 2020 revealed mild patchy scattered hemosiderin staining with inflammation more so in rectosigmoid area.  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    Iron deficiency anemia 07/14/2020    Mixed hearing loss of left ear     PAD (peripheral artery disease) 08/11/2020    Perianal abscess     Pernicious anemia 08/17/2020    took shots but never diagnosed with b12 deficiency    Personal history of alcoholism 08/11/2020    quit drinking in 2013    Prostatic hypertrophy 08/11/2020    Sensorineural hearing loss     Sepsis with acute renal failure 09/15/2020    Shortness of breath 05/27/2021    Tinnitus     Ventricular tachycardia, nonsustained 07/14/2020    Weight loss 07/11/2020       Past Surgical History:  Past Surgical History:   Procedure Laterality Date    AORTOGRAM Right 4/25/2025    Procedure: RIGHT LOWER EXTREMITY ANGIOGRAM, SHOCKWAVE LITHOTRIPSY, BALLOON ANGIOPLASTY, MYNX CLOSURE;  Surgeon: Gil Pineda DO;  [facility]: [facility] HYBRID OR;  Service: Vascular;  Laterality: Right;    AORTOGRAM Left 5/22/2025    Procedure: LEFT LOWER EXTREMITY ANGIOGRAM, SHOCKWAVE LITHOTRIPSY, BALLOON  ANGIOPLASTY, STENT PLACEMENT, MYNX CLOSURE;  Surgeon: Blayne Zabala MD;  Location: [facility] HYBRID OR;  Service: Vascular;  Laterality: Left;    AORTOGRAM Right 5/30/2025    Procedure: AORTOILIAC ANGIOGRAM WITH LEFT LOWER EXTREMITY ANGIOGRAM;  Surgeon: Gil Pineda DO;  Location: [facility] HYBRID OR;  Service: Vascular;  Laterality: Right;    ARTERY SURGERY  2021    right side on neck    CAROTID ENDARTERECTOMY Right 05/10/2021    Procedure: RIGHT CAROTID ENDARTERECTOMY WITH EEG;  Surgeon: Gil Pineda DO;  Location: [facility] HYBRID OR 12;  Service: Vascular;  Laterality: Right;    COLONOSCOPY N/A 07/02/2020    Procedure: COLONOSCOPY WITH ANESTHESIA;  Surgeon: Adrien Brewster MD;  Location: [facility] ENDOSCOPY;  Service: Gastroenterology;  Laterality: N/A;  pre op: diarrhea  post op: polyps  PCP: Joe Velasco MD    COLONOSCOPY N/A 10/13/2020    Procedure: COLONOSCOPY WITH ANESTHESIA;  Surgeon: Adrien Brewster MD;  Location: [facility] ENDOSCOPY;  Service: Gastroenterology;  Laterality: N/A;  Pre: Chronic Diarrhea, Crohn's  Post: AVM  Dr. Neftali Velasco  CO2 Inflation Used    COLONOSCOPY N/A 12/08/2023    Procedure: COLONOSCOPY WITH ANESTHESIA;  Surgeon: Adrien Brewster MD;  Location: [facility] ENDOSCOPY;  Service: Gastroenterology;  Laterality: N/A;  pre chrone's disease  post sub optimal prep, polyp, chrone's      CORONARY ARTERY BYPASS GRAFT  2003    x3    ENDOSCOPY N/A 11/02/2021    Procedure: ESOPHAGOGASTRODUODENOSCOPY WITH ANESTHESIA;  Surgeon: Bridger Bell MD;  Location: [facility] ENDOSCOPY;  Service: Gastroenterology;  Laterality: N/A;  pre anemia;gi bleed  post  gi bleed;schatski ring  Dr. ERIC Velasco    ENDOSCOPY N/A 10/10/2023    Procedure: ESOPHAGOGASTRODUODENOSCOPY WITH ANESTHESIA;  Surgeon: Adrien Brewster MD;  Location: [facility] ENDOSCOPY;  Service: Gastroenterology;  Laterality: N/A;  preop; anemia  postop esophagitis ; R/O barretts   PCP [facility]    EYE SURGERY Bilateral      catorac    INCISION AND DRAINAGE PERIRECTAL ABSCESS N/A 2017    Procedure: INCISION AND DRAINAGE OF JEET ANAL ABSCESS;  Surgeon: Lynette Smith MD;  Location:  [facility] OR;  Service:     INGUINAL HERNIA REPAIR Bilateral 2023    Procedure: INGUINAL HERNIA BILATERAL REPAIR LAPAROSCOPIC WITH DAVINCI ROBOT WITH MESH;  Surgeon: Tahira Rivera MD;  Location:  [facility] OR;  Service: Robotics - DaVinci;  Laterality: Bilateral;    INSERTION HEMODIALYSIS CATHETER N/A 2025    Procedure: HEMODIALYSIS CATHETER PLACEMENT;  Surgeon: Gil Pineda DO;  Location:  [facility] HYBRID OR;  Service: Vascular;  Laterality: N/A;    MYRINGOTOMY W/ TUBES Left 2017    06/10/2016    TONSILLECTOMY      TOTAL HIP ARTHROPLASTY Right        Family History  Family History   Problem Relation Age of Onset    Breast cancer Mother     Dementia Father     Glaucoma Father     No Known Problems Daughter     Colon polyps Neg Hx     Colon cancer Neg Hx        Social History  Social History     Socioeconomic History    Marital status:    Tobacco Use    Smoking status: Former     Current packs/day: 0.00     Average packs/day: 0.5 packs/day for 25.8 years (12.9 ttl pk-yrs)     Types: Cigarettes     Start date:      Quit date: 10/13/2013     Years since quittin.6     Passive exposure: Past    Smokeless tobacco: Never    Tobacco comments:     quit 2013   Vaping Use    Vaping status: Former    Substances: Nicotine    Devices: Pre-filled or refillable cartridge   Substance and Sexual Activity    Alcohol use: Not Currently    Drug use: No    Sexual activity: Not Currently     Partners: Female       Review of Systems:  History obtained from chart review and the patient  General ROS: No fever or chills  Respiratory ROS: No cough, shortness of breath, wheezing  Cardiovascular ROS: No chest pain or palpitations  Gastrointestinal ROS: No abdominal pain or melena  Genito-Urinary ROS: No dysuria or hematuria  Psych ROS: No  anxiety and depression  14 point ROS reviewed with the patient and negative except as noted above and in the HPI unless unable to obtain.    Objective:  Patient Vitals for the past 24 hrs:   BP Temp Temp src Pulse Resp SpO2 Weight   06/05/25 0721 160/66 98.1 °F (36.7 °C) Oral 68 16 96 % --   06/05/25 0626 170/65 97.9 °F (36.6 °C) Oral 70 16 95 % 58.7 kg (129 lb 6.4 oz)   06/04/25 1937 150/52 98.2 °F (36.8 °C) Oral 75 16 93 % --   06/04/25 1630 171/61 98.1 °F (36.7 °C) Oral 72 18 97 % --   06/04/25 1120 176/59 97.7 °F (36.5 °C) Oral 72 18 96 % --       Intake/Output Summary (Last 24 hours) at 6/5/2025 0927  Last data filed at 6/5/2025 0626  Gross per 24 hour   Intake --   Output 2800 ml   Net -2800 ml     General: awake/alert   Chest:  clear to auscultation bilaterally without respiratory distress  CVS: regular rate and rhythm  Abdominal: soft, nontender, positive bowel sounds  Extremities: no cyanosis or edema  Skin: warm and dry without rash      Labs:  Results from last 7 days   Lab Units 06/05/25 0423 06/04/25 0339 06/03/25 0429   WBC 10*3/mm3 7.65 7.05 6.80   HEMOGLOBIN g/dL 9.7* 8.7* 7.0*   HEMATOCRIT % 29.6* 27.3* 22.1*   PLATELETS 10*3/mm3 151 146 120*         Results from last 7 days   Lab Units 06/05/25  0423 06/04/25  0339 06/03/25  0429   SODIUM mmol/L 138 137 137   POTASSIUM mmol/L 4.3 4.5 4.2   CHLORIDE mmol/L 98 101 99   CO2 mmol/L 28.0 25.0 27.0   BUN mg/dL 20.0 34.9* 24.2*   CREATININE mg/dL 2.19* 3.45* 2.79*   CALCIUM mg/dL 9.0 8.8 8.2*   EGFR mL/min/1.73 30.3* 17.5* 22.6*   BILIRUBIN mg/dL 0.5 0.5  --    ALK PHOS U/L 197* 192*  --    ALT (SGPT) U/L 8 9  --    AST (SGOT) U/L 27 33  --    GLUCOSE mg/dL 101* 110* 109*       Radiology:   Imaging Results (Last 72 Hours)       Procedure Component Value Units Date/Time    IR Angiogram Extremity [[id number]] Collected: 06/02/25 0651     Updated: 06/02/25 1534    Narrative:      Performed by Dr. Pineda. Please see procedure note.     This report was  "signed and finalized on 6/2/2025 3:31 PM by Dr. Gil Pineda MD.       FL C Arm During Surgery [[id number]] Collected: 06/02/25 0651     Updated: 06/02/25 1534    Narrative:      Performed by Dr. Pineda. Please see procedure note.     This report was signed and finalized on 6/2/2025 3:31 PM by Dr. Gil Pineda MD.               Culture:  No results found for: \"BLOODCX\", \"URINECX\", \"WOUNDCX\", \"MRSACX\", \"RESPCX\", \"STOOLCX\"      Assessment    End stage renal disease on HD MWF  Hypertension  Anemia of CKD--s/p unit PRBC on 6/03  Peripheral vascular disease--s/p angiogram on 5/22 and multiple balloon angioplasties in the left lower extremity on 5/30   Thrombocytopenia  Hypokalemia--improved  Hyponatremia--improved    Plan:   Dialysis next due 6/06  OK for discharge from renal standpoint. Follow up will be via dialysis clinic      STERLING Sethi  6/5/2025  09:27 CDT    "

## 2025-06-05 NOTE — PLAN OF CARE
Problem: Adult Inpatient Plan of Care  Goal: Plan of Care Review  6/5/2025 0659 by Annmarie Whitney, RN  Outcome: Progressing  6/5/2025 0654 by Annmarie Whitney, RN  Outcome: Progressing   Goal Outcome Evaluation:  Plan of Care Reviewed With: patient        Progress: no change  Outcome Evaluation: voices no c/o of discomfort at this time.

## 2025-06-05 NOTE — CASE MANAGEMENT/SOCIAL WORK
Continued Stay Note   [facility]     Patient Name: Ricardo Hugo  MRN: [id number]  Today's Date: 6/5/2025    Admit Date: 5/21/2025    Plan: [facility]   Discharge Plan       Row Name 06/05/25 1021       Plan    Plan [facility]    Patient/Family in Agreement with Plan yes    Final Discharge Disposition Code 03 - skilled nursing facility (SNF)    Final Note Pt is being d/c'ed to [facility] [phone] today. They will pull the d/c summary from [facility]. Family will transport.                   Discharge Codes    No documentation.                 Expected Discharge Date and Time       Expected Discharge Date Expected Discharge Time    Jun 5, 2025               LINNETTE Ramos

## 2025-06-06 NOTE — THERAPY DISCHARGE NOTE
Acute Care - Physical Therapy Discharge Summary  [facility]       Patient Name: Ricardo Hugo  : 1950  MRN: [id number]    Today's Date: 2025                 Admit Date: 2025      PT Recommendation and Plan    Visit Dx:    ICD-10-CM ICD-9-CM   1. Arterial occlusion  I70.90 444.9   2. Dialysis patient  Z99.2 V45.11   3. Impaired mobility [Z74.09]  Z74.09 799.89   4. Severe protein-calorie malnutrition  E43 262   5. Thrombocytopenia  D69.6 287.5   6. Hyperkalemia  E87.5 276.7   7. Acute pain of left lower extremity  M79.605 729.5   8. Anemia, unspecified type  D64.9 285.9   9. Preop testing  Z01.818 V72.84   10. ESRD (end stage renal disease) on dialysis  N18.6 585.6    Z99.2 V45.11   11. Abnormal TSH  R79.89 790.6   12. History of pneumonia, current treatment with cefdinir  Z87.01 V12.61   13. Bradycardia  R00.1 427.89   14. Chronic diastolic (congestive) heart failure  I50.32 428.32     428.0   15. Persistent proteinuria  R80.1 791.0   16. Dermatitis associated with moisture  L30.8 692.89   17. Sleep difficulties  G47.9 780.50   18. Bilateral inguinal hernia without obstruction or gangrene, recurrence not specified  K40.20 550.92   19. Unilateral recurrent inguinal hernia without obstruction or gangrene  K40.91 550.91   20. Hypertrophy of inferior nasal turbinate  J34.3 478.0   21. Nasal septal deviation  J34.2 470   22. Anticoagulated  Z79.01 V58.61   23. Sensorineural hearing loss (SNHL), bilateral  H90.3 389.18   24. Eustachian tube dysfunction, bilateral  H69.93 381.81   25. Systolic murmur  R01.1 785.2   26. Mixed hyperlipidemia  E78.2 272.2   27. Perforation of left tympanic membrane  H72.92 384.20   28. CLL (chronic lymphocytic leukemia)  C91.10 204.10   29. Low iron   E61.1 280.9   30. Copper deficiency  E61.0 275.1   31. Anemia due to chronic kidney disease, on chronic dialysis  N18.6 585.6    D63.1 285.21    Z99.2 V45.11   32. CKD (chronic kidney disease) stage 5  N18.4 585.4   33.  Diastolic dysfunction  I51.89 429.9   34. Carotid stenosis, right  I65.21 433.10   35. Stenosis of right carotid artery  I65.21 433.10   36. Bilateral carotid artery stenosis  I65.23 433.10     433.30   37. IHD (ischemic heart disease)  I25.9 414.9   38. Peripheral arterial disease  I73.9 443.9   39. Wellness examination  Z00.00 V70.0   40. Symptomatic anemia  D64.9 285.9   41. Chronic diarrhea  K52.9 787.91   42. Resistant hypertension  I1A.0 401.9   43. Essential hypertension  I10 401.9   44. Chronic rhinitis  J31.0 472.0                PT Rehab Goals       Row Name 06/06/25 0700             Bed Mobility Goal 1 (PT)    Activity/Assistive Device (Bed Mobility Goal 1, PT) sit to supine  -AB      Hardin Level/Cues Needed (Bed Mobility Goal 1, PT) independent  -AB      Time Frame (Bed Mobility Goal 1, PT) long term goal (LTG)  -AB      Progress/Outcomes (Bed Mobility Goal 1, PT) goal not met  -AB         Transfer Goal 1 (PT)    Activity/Assistive Device (Transfer Goal 1, PT) sit-to-stand/stand-to-sit;bed-to-chair/chair-to-bed;walker, rolling  -AB      Hardin Level/Cues Needed (Transfer Goal 1, PT) minimum assist (75% or more patient effort)  -AB      Time Frame (Transfer Goal 1, PT) long term goal (LTG)  -AB      Progress/Outcome (Transfer Goal 1, PT) goal not met  -AB         Gait Training Goal 1 (PT)    Activity/Assistive Device (Gait Training Goal 1, PT) gait (walking locomotion);walker, rolling  -AB      Hardin Level (Gait Training Goal 1, PT) minimum assist (75% or more patient effort)  -AB      Distance (Gait Training Goal 1, PT) 40 ft w/o LOB or feeling fatigued  -AB      Time Frame (Gait Training Goal 1, PT) long term goal (LTG)  -AB      Progress/Outcome (Gait Training Goal 1, PT) goal not met  -AB         Balance Goal 1 (PT)    Activity/Assistive Device (Balance Goal) standing static balance;standing dynamic balance  -AB      Hardin Level/Cues Needed (Balance Goal 1, PT) supervision  required  -AB      Time Frame (Balance Goal 1, PT) long-term goal (LTG)  -AB      Progress/Outcomes (Balance Goal 1, PT) goal not met  -AB         Problem Specific Goal 1 (PT)    Problem Specific Goal 1 (PT) decrease pain to a 4/10 w/ activity  -AB      Time Frame (Problem Specific Goal 1, PT) long-term goal (LTG)  -AB      Progress/Outcome (Problem Specific Goal 1, PT) new goal  -AB                User Key  (r) = Recorded By, (t) = Taken By, (c) = Cosigned By      Initials Name Provider Type Discipline    Virginia Dillon, PTA Physical Therapist Assistant PT                        PT Discharge Summary  Anticipated Discharge Disposition (PT): skilled nursing facility  Reason for Discharge: Discharge from facility  Outcomes Achieved: Refer to plan of care for updates on goals achieved  Discharge Destination: SNF      Virginia Villarreal PTA   6/6/2025

## 2025-06-09 ENCOUNTER — TELEPHONE (OUTPATIENT)
Dept: VASCULAR SURGERY | Facility: CLINIC | Age: 75
End: 2025-06-09

## 2025-06-09 ENCOUNTER — TELEPHONE (OUTPATIENT)
Dept: INTERNAL MEDICINE | Facility: CLINIC | Age: 75
End: 2025-06-09

## 2025-06-09 ENCOUNTER — TELEPHONE (OUTPATIENT)
Dept: VASCULAR SURGERY | Facility: CLINIC | Age: 75
End: 2025-06-09
Payer: MEDICARE

## 2025-06-09 NOTE — TELEPHONE ENCOUNTER
Caller: jesi chavez     Relationship: DAUGHTER    Best call back number: [phone]    What is your medical concern? PT SENT A MESSAGE ON MY CHART AND WANTS TO LEAVE REHAB, DAUGHTER CALLED IN AND STATED THAT PT IS UNABLE TO WALK ON HIS OWN OR TAKE CARE OF HIMSELF AND DAUGHTER IS REQUESTING A CALL BACK TO SPEAK WITH APOLINAR OR NURSE, PT HAS NOT EVEN WENT TO HIS FIRST PHYSICAL THERAPY APPOINTMENT YET AND DAUGHTER STATES PT NEEDS TO STAY IN REHAB AND GET HELP    How long has this issue been going on? 6.8.25    Is your provider already aware of this issue? NO    Have you been treated for this issue? NO

## 2025-06-09 NOTE — TELEPHONE ENCOUNTER
LM on Kathleen's VM that she will need to communicate her wishes with his nurse at [facility] as they will not accept outside orders from our office.

## 2025-06-09 NOTE — TELEPHONE ENCOUNTER
"Patient's daughter, Kathleen called and said patient is \"talking out of his head and very confused.\" Patient is at [facility]. She thinks he may have a UTI and wants to know if Dr. Zimmer can send an order for a urinalysis. Please call Kathleen at [phone]  "

## 2025-06-09 NOTE — TELEPHONE ENCOUNTER
Caller: kathybela    Relationship: Emergency Contact    Best call back number: [phone]     What is the best time to reach you: ANYTIME    Who are you requesting to speak with (clinical staff, provider,  specific staff member): APOLINAR     Do you know the name of the person who called: PTS DAUGHTER BELA    What was the call regarding: PTS DAUGHTER CALLED BACK IN TO INFORM APOLINAR THAT SHE FOUND OUT THAT THEY ARE GIVING HER FATHER NARCAN ACCORDING TO THE NURSE THIS MORNING AT THE REHAB FACILITY. SHE ASKED WHAT HE WAS TAKING AS A DAILY MEDICATION FROM THE HOSPITAL AND WAS TOLD NARCAN. SHE IS CONCERNED AND WANTS TO CLARIFY WHY THIS WOULD BE SOMETHING HE WOULD BE TAKING THIS EVERYDAY. SHE IS WORRIED ABOUT HIS CONFUSION AS WELL. SHE SAID SHE SPOKE WITH APOLINAR TODAY AND THAT SHE WAS SUPPOSED TO CALL BACK TO INFORM HER OF THIS INFORMATION TO FURTHER DISCUSS WHY HE IS ACTING OFF. PLEASE CALL THE PTS DAUGHTER     Is it okay if the provider responds through MyChart: NO

## 2025-06-19 ENCOUNTER — APPOINTMENT (OUTPATIENT)
Dept: ULTRASOUND IMAGING | Facility: HOSPITAL | Age: 75
DRG: 278 | End: 2025-06-19
Payer: MEDICARE

## 2025-06-19 ENCOUNTER — TELEPHONE (OUTPATIENT)
Dept: VASCULAR SURGERY | Facility: CLINIC | Age: 75
End: 2025-06-19
Payer: MEDICARE

## 2025-06-19 ENCOUNTER — HOSPITAL ENCOUNTER (INPATIENT)
Facility: HOSPITAL | Age: 75
Discharge: NURSING, REHAB OR LONG-TERM CARE | DRG: 278 | End: 2025-06-19
Attending: FAMILY MEDICINE | Admitting: FAMILY MEDICINE
Payer: MEDICARE

## 2025-06-19 ENCOUNTER — APPOINTMENT (OUTPATIENT)
Dept: GENERAL RADIOLOGY | Facility: HOSPITAL | Age: 75
DRG: 278 | End: 2025-06-19
Payer: MEDICARE

## 2025-06-19 DIAGNOSIS — I25.9 IHD (ISCHEMIC HEART DISEASE): ICD-10-CM

## 2025-06-19 DIAGNOSIS — N18.6 ESRD (END STAGE RENAL DISEASE) ON DIALYSIS: ICD-10-CM

## 2025-06-19 DIAGNOSIS — K52.9 CHRONIC DIARRHEA: ICD-10-CM

## 2025-06-19 DIAGNOSIS — Z99.2 DIALYSIS PATIENT: ICD-10-CM

## 2025-06-19 DIAGNOSIS — R00.1 BRADYCARDIA: ICD-10-CM

## 2025-06-19 DIAGNOSIS — I65.21 CAROTID STENOSIS, RIGHT: ICD-10-CM

## 2025-06-19 DIAGNOSIS — I10 ESSENTIAL HYPERTENSION: ICD-10-CM

## 2025-06-19 DIAGNOSIS — K40.20 BILATERAL INGUINAL HERNIA WITHOUT OBSTRUCTION OR GANGRENE, RECURRENCE NOT SPECIFIED: ICD-10-CM

## 2025-06-19 DIAGNOSIS — E43 SEVERE PROTEIN-CALORIE MALNUTRITION: ICD-10-CM

## 2025-06-19 DIAGNOSIS — I50.32 CHRONIC DIASTOLIC (CONGESTIVE) HEART FAILURE: ICD-10-CM

## 2025-06-19 DIAGNOSIS — Z74.09 IMPAIRED MOBILITY: ICD-10-CM

## 2025-06-19 DIAGNOSIS — H72.92 PERFORATION OF LEFT TYMPANIC MEMBRANE: ICD-10-CM

## 2025-06-19 DIAGNOSIS — Z99.2 ANEMIA DUE TO CHRONIC KIDNEY DISEASE, ON CHRONIC DIALYSIS: ICD-10-CM

## 2025-06-19 DIAGNOSIS — I65.21 STENOSIS OF RIGHT CAROTID ARTERY: ICD-10-CM

## 2025-06-19 DIAGNOSIS — G47.9 SLEEP DIFFICULTIES: ICD-10-CM

## 2025-06-19 DIAGNOSIS — R80.1 PERSISTENT PROTEINURIA: ICD-10-CM

## 2025-06-19 DIAGNOSIS — K40.91 UNILATERAL RECURRENT INGUINAL HERNIA WITHOUT OBSTRUCTION OR GANGRENE: ICD-10-CM

## 2025-06-19 DIAGNOSIS — C91.10 CLL (CHRONIC LYMPHOCYTIC LEUKEMIA): ICD-10-CM

## 2025-06-19 DIAGNOSIS — J34.2 NASAL SEPTAL DEVIATION: ICD-10-CM

## 2025-06-19 DIAGNOSIS — I70.90 ARTERIAL OCCLUSION: ICD-10-CM

## 2025-06-19 DIAGNOSIS — Z79.01 ANTICOAGULATED: ICD-10-CM

## 2025-06-19 DIAGNOSIS — J34.3 HYPERTROPHY OF INFERIOR NASAL TURBINATE: ICD-10-CM

## 2025-06-19 DIAGNOSIS — E61.0 COPPER DEFICIENCY: ICD-10-CM

## 2025-06-19 DIAGNOSIS — J31.0 CHRONIC RHINITIS: ICD-10-CM

## 2025-06-19 DIAGNOSIS — I65.23 BILATERAL CAROTID ARTERY STENOSIS: ICD-10-CM

## 2025-06-19 DIAGNOSIS — E87.5 HYPERKALEMIA: ICD-10-CM

## 2025-06-19 DIAGNOSIS — E61.1 LOW IRON: ICD-10-CM

## 2025-06-19 DIAGNOSIS — H90.3 SENSORINEURAL HEARING LOSS (SNHL), BILATERAL: ICD-10-CM

## 2025-06-19 DIAGNOSIS — D69.6 THROMBOCYTOPENIA: ICD-10-CM

## 2025-06-19 DIAGNOSIS — I99.8 VASCULAR OCCLUSION: Primary | ICD-10-CM

## 2025-06-19 DIAGNOSIS — H69.93 EUSTACHIAN TUBE DYSFUNCTION, BILATERAL: ICD-10-CM

## 2025-06-19 DIAGNOSIS — I1A.0 RESISTANT HYPERTENSION: ICD-10-CM

## 2025-06-19 DIAGNOSIS — D64.9 ANEMIA, UNSPECIFIED TYPE: ICD-10-CM

## 2025-06-19 DIAGNOSIS — N18.6 ANEMIA DUE TO CHRONIC KIDNEY DISEASE, ON CHRONIC DIALYSIS: ICD-10-CM

## 2025-06-19 DIAGNOSIS — E78.2 MIXED HYPERLIPIDEMIA: ICD-10-CM

## 2025-06-19 DIAGNOSIS — Z99.2 ESRD (END STAGE RENAL DISEASE) ON DIALYSIS: ICD-10-CM

## 2025-06-19 DIAGNOSIS — R79.89 ABNORMAL TSH: ICD-10-CM

## 2025-06-19 DIAGNOSIS — N18.4 CKD (CHRONIC KIDNEY DISEASE) STAGE 4, GFR 15-29 ML/MIN: ICD-10-CM

## 2025-06-19 DIAGNOSIS — Z01.818 PREOP TESTING: ICD-10-CM

## 2025-06-19 DIAGNOSIS — I51.89 DIASTOLIC DYSFUNCTION: ICD-10-CM

## 2025-06-19 DIAGNOSIS — L30.8 DERMATITIS ASSOCIATED WITH MOISTURE: ICD-10-CM

## 2025-06-19 DIAGNOSIS — Z87.01 HISTORY OF PNEUMONIA: ICD-10-CM

## 2025-06-19 DIAGNOSIS — Z00.00 WELLNESS EXAMINATION: ICD-10-CM

## 2025-06-19 DIAGNOSIS — D63.1 ANEMIA DUE TO CHRONIC KIDNEY DISEASE, ON CHRONIC DIALYSIS: ICD-10-CM

## 2025-06-19 DIAGNOSIS — R01.1 SYSTOLIC MURMUR: Chronic | ICD-10-CM

## 2025-06-19 DIAGNOSIS — I73.9 PERIPHERAL ARTERIAL DISEASE: ICD-10-CM

## 2025-06-19 DIAGNOSIS — D64.9 SYMPTOMATIC ANEMIA: ICD-10-CM

## 2025-06-19 DIAGNOSIS — M79.605 ACUTE PAIN OF LEFT LOWER EXTREMITY: ICD-10-CM

## 2025-06-19 LAB
ABO GROUP BLD: NORMAL
ALBUMIN SERPL-MCNC: 3.3 G/DL (ref 3.5–5.2)
ALBUMIN/GLOB SERPL: 1.4 G/DL
ALP SERPL-CCNC: 180 U/L (ref 39–117)
ALT SERPL W P-5'-P-CCNC: 10 U/L (ref 1–41)
ANION GAP SERPL CALCULATED.3IONS-SCNC: 10 MMOL/L (ref 5–15)
APTT PPP: 33.3 SECONDS (ref 24.5–36)
AST SERPL-CCNC: 17 U/L (ref 1–40)
BASOPHILS # BLD AUTO: 0.05 10*3/MM3 (ref 0–0.2)
BASOPHILS NFR BLD AUTO: 0.9 % (ref 0–1.5)
BILIRUB SERPL-MCNC: 0.3 MG/DL (ref 0–1.2)
BLD GP AB SCN SERPL QL: NEGATIVE
BUN SERPL-MCNC: 32.8 MG/DL (ref 8–23)
BUN/CREAT SERPL: 12.3 (ref 7–25)
CALCIUM SPEC-SCNC: 8.6 MG/DL (ref 8.6–10.5)
CHLORIDE SERPL-SCNC: 98 MMOL/L (ref 98–107)
CO2 SERPL-SCNC: 27 MMOL/L (ref 22–29)
CREAT SERPL-MCNC: 2.66 MG/DL (ref 0.76–1.27)
DEPRECATED RDW RBC AUTO: 67.7 FL (ref 37–54)
EGFRCR SERPLBLD CKD-EPI 2021: 24 ML/MIN/1.73
EOSINOPHIL # BLD AUTO: 0.24 10*3/MM3 (ref 0–0.4)
EOSINOPHIL NFR BLD AUTO: 4.2 % (ref 0.3–6.2)
ERYTHROCYTE [DISTWIDTH] IN BLOOD BY AUTOMATED COUNT: 17.9 % (ref 12.3–15.4)
GLOBULIN UR ELPH-MCNC: 2.3 GM/DL
GLUCOSE SERPL-MCNC: 107 MG/DL (ref 65–99)
HCT VFR BLD AUTO: 25.3 % (ref 37.5–51)
HGB BLD-MCNC: 8.1 G/DL (ref 13–17.7)
IMM GRANULOCYTES # BLD AUTO: 0.05 10*3/MM3 (ref 0–0.05)
IMM GRANULOCYTES NFR BLD AUTO: 0.9 % (ref 0–0.5)
INR PPP: 1.19 (ref 0.91–1.09)
LYMPHOCYTES # BLD AUTO: 0.88 10*3/MM3 (ref 0.7–3.1)
LYMPHOCYTES NFR BLD AUTO: 15.6 % (ref 19.6–45.3)
MAGNESIUM SERPL-MCNC: 1.9 MG/DL (ref 1.6–2.4)
MCH RBC QN AUTO: 32.5 PG (ref 26.6–33)
MCHC RBC AUTO-ENTMCNC: 32 G/DL (ref 31.5–35.7)
MCV RBC AUTO: 101.6 FL (ref 79–97)
MONOCYTES # BLD AUTO: 0.75 10*3/MM3 (ref 0.1–0.9)
MONOCYTES NFR BLD AUTO: 13.3 % (ref 5–12)
NEUTROPHILS NFR BLD AUTO: 3.68 10*3/MM3 (ref 1.7–7)
NEUTROPHILS NFR BLD AUTO: 65.1 % (ref 42.7–76)
NRBC BLD AUTO-RTO: 0 /100 WBC (ref 0–0.2)
PHOSPHATE SERPL-MCNC: 3.3 MG/DL (ref 2.5–4.5)
PLATELET # BLD AUTO: 109 10*3/MM3 (ref 140–450)
PMV BLD AUTO: 9.9 FL (ref 6–12)
POTASSIUM SERPL-SCNC: 4 MMOL/L (ref 3.5–5.2)
PROT SERPL-MCNC: 5.6 G/DL (ref 6–8.5)
PROTHROMBIN TIME: 15.7 SECONDS (ref 11.8–14.8)
RBC # BLD AUTO: 2.49 10*6/MM3 (ref 4.14–5.8)
RH BLD: NEGATIVE
SODIUM SERPL-SCNC: 135 MMOL/L (ref 136–145)
T&S EXPIRATION DATE: NORMAL
UFH PPP CHRO-ACNC: <0.1 IU/ML (ref 0.3–0.7)
WBC NRBC COR # BLD AUTO: 5.65 10*3/MM3 (ref 3.4–10.8)

## 2025-06-19 PROCEDURE — 86900 BLOOD TYPING SEROLOGIC ABO: CPT | Performed by: STUDENT IN AN ORGANIZED HEALTH CARE EDUCATION/TRAINING PROGRAM

## 2025-06-19 PROCEDURE — 86850 RBC ANTIBODY SCREEN: CPT | Performed by: STUDENT IN AN ORGANIZED HEALTH CARE EDUCATION/TRAINING PROGRAM

## 2025-06-19 PROCEDURE — 93926 LOWER EXTREMITY STUDY: CPT | Performed by: STUDENT IN AN ORGANIZED HEALTH CARE EDUCATION/TRAINING PROGRAM

## 2025-06-19 PROCEDURE — 85610 PROTHROMBIN TIME: CPT | Performed by: PHYSICIAN ASSISTANT

## 2025-06-19 PROCEDURE — 86901 BLOOD TYPING SEROLOGIC RH(D): CPT | Performed by: STUDENT IN AN ORGANIZED HEALTH CARE EDUCATION/TRAINING PROGRAM

## 2025-06-19 PROCEDURE — 85520 HEPARIN ASSAY: CPT | Performed by: STUDENT IN AN ORGANIZED HEALTH CARE EDUCATION/TRAINING PROGRAM

## 2025-06-19 PROCEDURE — 71045 X-RAY EXAM CHEST 1 VIEW: CPT

## 2025-06-19 PROCEDURE — 93005 ELECTROCARDIOGRAM TRACING: CPT | Performed by: PHYSICIAN ASSISTANT

## 2025-06-19 PROCEDURE — 80053 COMPREHEN METABOLIC PANEL: CPT | Performed by: PHYSICIAN ASSISTANT

## 2025-06-19 PROCEDURE — 83735 ASSAY OF MAGNESIUM: CPT | Performed by: PHYSICIAN ASSISTANT

## 2025-06-19 PROCEDURE — 25010000002 MORPHINE PER 10 MG: Performed by: FAMILY MEDICINE

## 2025-06-19 PROCEDURE — 85025 COMPLETE CBC W/AUTO DIFF WBC: CPT | Performed by: PHYSICIAN ASSISTANT

## 2025-06-19 PROCEDURE — 25010000002 HEPARIN (PORCINE) PER 1000 UNITS: Performed by: FAMILY MEDICINE

## 2025-06-19 PROCEDURE — 25010000002 METOCLOPRAMIDE PER 10 MG: Performed by: FAMILY MEDICINE

## 2025-06-19 PROCEDURE — 25010000002 HYDROMORPHONE PER 4 MG: Performed by: FAMILY MEDICINE

## 2025-06-19 PROCEDURE — 25010000002 HEPARIN (PORCINE) 25000-0.45 UT/250ML-% SOLUTION: Performed by: FAMILY MEDICINE

## 2025-06-19 PROCEDURE — 84100 ASSAY OF PHOSPHORUS: CPT | Performed by: PHYSICIAN ASSISTANT

## 2025-06-19 PROCEDURE — 99285 EMERGENCY DEPT VISIT HI MDM: CPT

## 2025-06-19 PROCEDURE — 93010 ELECTROCARDIOGRAM REPORT: CPT | Performed by: INTERNAL MEDICINE

## 2025-06-19 PROCEDURE — 93926 LOWER EXTREMITY STUDY: CPT

## 2025-06-19 PROCEDURE — 85730 THROMBOPLASTIN TIME PARTIAL: CPT | Performed by: STUDENT IN AN ORGANIZED HEALTH CARE EDUCATION/TRAINING PROGRAM

## 2025-06-19 RX ORDER — LEVOTHYROXINE SODIUM 100 UG/1
100 TABLET ORAL
Status: DISPENSED | OUTPATIENT
Start: 2025-06-20

## 2025-06-19 RX ORDER — TAMSULOSIN HYDROCHLORIDE 0.4 MG/1
0.4 CAPSULE ORAL NIGHTLY
Status: DISPENSED | OUTPATIENT
Start: 2025-06-19

## 2025-06-19 RX ORDER — ALPRAZOLAM 0.25 MG
0.25 TABLET ORAL NIGHTLY PRN
Status: DISCONTINUED | OUTPATIENT
Start: 2025-06-19 | End: 2025-06-21

## 2025-06-19 RX ORDER — SPIRONOLACTONE 25 MG/1
25 TABLET ORAL DAILY
Status: DISPENSED | OUTPATIENT
Start: 2025-06-19

## 2025-06-19 RX ORDER — METOCLOPRAMIDE HYDROCHLORIDE 5 MG/ML
5 INJECTION INTRAMUSCULAR; INTRAVENOUS ONCE
Status: COMPLETED | OUTPATIENT
Start: 2025-06-19 | End: 2025-06-19

## 2025-06-19 RX ORDER — NIFEDIPINE 60 MG/1
120 TABLET, EXTENDED RELEASE ORAL DAILY
Status: DISPENSED | OUTPATIENT
Start: 2025-06-19

## 2025-06-19 RX ORDER — HEPARIN SODIUM 1000 [USP'U]/ML
50 INJECTION, SOLUTION INTRAVENOUS; SUBCUTANEOUS AS NEEDED
Status: DISCONTINUED | OUTPATIENT
Start: 2025-06-19 | End: 2025-06-20

## 2025-06-19 RX ORDER — HEPARIN SODIUM 1000 [USP'U]/ML
25 INJECTION, SOLUTION INTRAVENOUS; SUBCUTANEOUS AS NEEDED
Status: DISCONTINUED | OUTPATIENT
Start: 2025-06-19 | End: 2025-06-20

## 2025-06-19 RX ORDER — CLONIDINE HYDROCHLORIDE 0.1 MG/1
0.3 TABLET ORAL 3 TIMES DAILY
Status: DISCONTINUED | OUTPATIENT
Start: 2025-06-19 | End: 2025-06-21

## 2025-06-19 RX ORDER — NALOXONE HCL 0.4 MG/ML
0.4 VIAL (ML) INJECTION
Status: ACTIVE | OUTPATIENT
Start: 2025-06-19

## 2025-06-19 RX ORDER — SODIUM CHLORIDE 0.9 % (FLUSH) 0.9 %
10 SYRINGE (ML) INJECTION EVERY 12 HOURS SCHEDULED
Status: DISPENSED | OUTPATIENT
Start: 2025-06-19

## 2025-06-19 RX ORDER — CLOPIDOGREL BISULFATE 75 MG/1
75 TABLET ORAL DAILY
Status: DISPENSED | OUTPATIENT
Start: 2025-06-20

## 2025-06-19 RX ORDER — SODIUM CHLORIDE 9 MG/ML
40 INJECTION, SOLUTION INTRAVENOUS AS NEEDED
Status: DISPENSED | OUTPATIENT
Start: 2025-06-19

## 2025-06-19 RX ORDER — SODIUM CHLORIDE 0.9 % (FLUSH) 0.9 %
10 SYRINGE (ML) INJECTION AS NEEDED
Status: DISCONTINUED | OUTPATIENT
Start: 2025-06-19 | End: 2025-06-19 | Stop reason: SDUPTHER

## 2025-06-19 RX ORDER — POLYETHYLENE GLYCOL 3350 17 G/17G
17 POWDER, FOR SOLUTION ORAL DAILY PRN
Status: ACTIVE | OUTPATIENT
Start: 2025-06-19

## 2025-06-19 RX ORDER — ACETAMINOPHEN 160 MG/5ML
650 SOLUTION ORAL EVERY 4 HOURS PRN
Status: ACTIVE | OUTPATIENT
Start: 2025-06-19

## 2025-06-19 RX ORDER — ISOSORBIDE DINITRATE 10 MG/1
10 TABLET ORAL
Status: DISPENSED | OUTPATIENT
Start: 2025-06-19

## 2025-06-19 RX ORDER — CARVEDILOL 6.25 MG/1
12.5 TABLET ORAL 2 TIMES DAILY WITH MEALS
Status: DISPENSED | OUTPATIENT
Start: 2025-06-19

## 2025-06-19 RX ORDER — HEPARIN SODIUM 10000 [USP'U]/100ML
12 INJECTION, SOLUTION INTRAVENOUS
Status: DISCONTINUED | OUTPATIENT
Start: 2025-06-19 | End: 2025-06-20

## 2025-06-19 RX ORDER — AMOXICILLIN 250 MG
2 CAPSULE ORAL 2 TIMES DAILY
Status: DISPENSED | OUTPATIENT
Start: 2025-06-19

## 2025-06-19 RX ORDER — ACETAMINOPHEN 325 MG/1
650 TABLET ORAL EVERY 4 HOURS PRN
Status: DISPENSED | OUTPATIENT
Start: 2025-06-19

## 2025-06-19 RX ORDER — HYDROMORPHONE HYDROCHLORIDE 1 MG/ML
0.5 INJECTION, SOLUTION INTRAMUSCULAR; INTRAVENOUS; SUBCUTANEOUS
Refills: 0 | Status: DISCONTINUED | OUTPATIENT
Start: 2025-06-19 | End: 2025-06-21

## 2025-06-19 RX ORDER — BISACODYL 5 MG/1
5 TABLET, DELAYED RELEASE ORAL DAILY PRN
Status: DISPENSED | OUTPATIENT
Start: 2025-06-19

## 2025-06-19 RX ORDER — BISACODYL 10 MG
10 SUPPOSITORY, RECTAL RECTAL DAILY PRN
Status: ACTIVE | OUTPATIENT
Start: 2025-06-19

## 2025-06-19 RX ORDER — HEPARIN SODIUM 1000 [USP'U]/ML
60 INJECTION, SOLUTION INTRAVENOUS; SUBCUTANEOUS ONCE
Status: COMPLETED | OUTPATIENT
Start: 2025-06-19 | End: 2025-06-19

## 2025-06-19 RX ORDER — HYDRALAZINE HYDROCHLORIDE 50 MG/1
100 TABLET, FILM COATED ORAL 3 TIMES DAILY
Status: DISPENSED | OUTPATIENT
Start: 2025-06-19

## 2025-06-19 RX ORDER — SODIUM CHLORIDE 0.9 % (FLUSH) 0.9 %
10 SYRINGE (ML) INJECTION AS NEEDED
Status: DISPENSED | OUTPATIENT
Start: 2025-06-19

## 2025-06-19 RX ORDER — MONTELUKAST SODIUM 10 MG/1
10 TABLET ORAL NIGHTLY
Status: DISPENSED | OUTPATIENT
Start: 2025-06-19

## 2025-06-19 RX ORDER — NITROGLYCERIN 0.4 MG/1
0.4 TABLET SUBLINGUAL
Status: ACTIVE | OUTPATIENT
Start: 2025-06-19

## 2025-06-19 RX ORDER — VALSARTAN 80 MG/1
320 TABLET ORAL DAILY
Status: DISPENSED | OUTPATIENT
Start: 2025-06-19

## 2025-06-19 RX ORDER — ASPIRIN 81 MG/1
81 TABLET ORAL DAILY
Status: DISPENSED | OUTPATIENT
Start: 2025-06-19

## 2025-06-19 RX ORDER — GABAPENTIN 300 MG/1
300 CAPSULE ORAL NIGHTLY
Status: DISPENSED | OUTPATIENT
Start: 2025-06-19

## 2025-06-19 RX ORDER — ATORVASTATIN CALCIUM 10 MG/1
10 TABLET, FILM COATED ORAL NIGHTLY
Status: DISPENSED | OUTPATIENT
Start: 2025-06-19

## 2025-06-19 RX ORDER — ACETAMINOPHEN 650 MG/1
650 SUPPOSITORY RECTAL EVERY 4 HOURS PRN
Status: ACTIVE | OUTPATIENT
Start: 2025-06-19

## 2025-06-19 RX ORDER — OXYCODONE AND ACETAMINOPHEN 5; 325 MG/1; MG/1
1 TABLET ORAL EVERY 6 HOURS PRN
Refills: 0 | Status: DISCONTINUED | OUTPATIENT
Start: 2025-06-19 | End: 2025-06-23

## 2025-06-19 RX ADMIN — HYDRALAZINE HYDROCHLORIDE 100 MG: 50 TABLET ORAL at 21:53

## 2025-06-19 RX ADMIN — MONTELUKAST SODIUM 10 MG: 10 TABLET, COATED ORAL at 21:52

## 2025-06-19 RX ADMIN — HEPARIN SODIUM 3710 UNITS: 1000 INJECTION INTRAVENOUS; SUBCUTANEOUS at 20:17

## 2025-06-19 RX ADMIN — TAMSULOSIN HYDROCHLORIDE 0.4 MG: 0.4 CAPSULE ORAL at 21:52

## 2025-06-19 RX ADMIN — CARVEDILOL 12.5 MG: 6.25 TABLET, FILM COATED ORAL at 21:53

## 2025-06-19 RX ADMIN — Medication 10 ML: at 21:54

## 2025-06-19 RX ADMIN — ATORVASTATIN CALCIUM 10 MG: 10 TABLET, FILM COATED ORAL at 21:52

## 2025-06-19 RX ADMIN — NIFEDIPINE 120 MG: 60 TABLET, FILM COATED, EXTENDED RELEASE ORAL at 21:52

## 2025-06-19 RX ADMIN — MORPHINE SULFATE 4 MG: 4 INJECTION, SOLUTION INTRAMUSCULAR; INTRAVENOUS at 15:58

## 2025-06-19 RX ADMIN — HEPARIN SODIUM 12 UNITS/KG/HR: 10000 INJECTION, SOLUTION INTRAVENOUS at 20:18

## 2025-06-19 RX ADMIN — SPIRONOLACTONE 25 MG: 25 TABLET ORAL at 21:52

## 2025-06-19 RX ADMIN — VALSARTAN 320 MG: 80 TABLET, FILM COATED ORAL at 21:52

## 2025-06-19 RX ADMIN — GABAPENTIN 300 MG: 300 CAPSULE ORAL at 21:52

## 2025-06-19 RX ADMIN — CLONIDINE HYDROCHLORIDE 0.3 MG: 0.1 TABLET ORAL at 21:53

## 2025-06-19 RX ADMIN — HYDROMORPHONE HYDROCHLORIDE 0.5 MG: 1 INJECTION, SOLUTION INTRAMUSCULAR; INTRAVENOUS; SUBCUTANEOUS at 20:32

## 2025-06-19 RX ADMIN — DOCUSATE SODIUM 50 MG AND SENNOSIDES 8.6 MG 2 TABLET: 8.6; 5 TABLET, FILM COATED ORAL at 21:53

## 2025-06-19 RX ADMIN — ISOSORBIDE DINITRATE 10 MG: 10 TABLET ORAL at 21:53

## 2025-06-19 RX ADMIN — METOCLOPRAMIDE HYDROCHLORIDE 5 MG: 5 INJECTION INTRAMUSCULAR; INTRAVENOUS at 16:00

## 2025-06-19 RX ADMIN — ASPIRIN 81 MG: 81 TABLET, COATED ORAL at 21:52

## 2025-06-19 NOTE — ED PROVIDER NOTES
Subjective   History of Present Illness 75-year-old male presents to the ER with history of peripheral Herl disease and with recent right aortogram on May 30, 2025 presents today with cold foot that is red and there is blister formation on the toes.  Patient tells me that they had discussed his problem with the right leg and it may need to be amputated he told me.  He had the left aortogram on May 22 and the right aortogram on April 25.  Nurse got a faint dorsalis pedis pulse on the right foot.  He states he has no pain at this time.    Review of Systems no fevers chills cough congestion chest pain shortness of breath.  No nausea vomiting diarrhea abdominal pain.  Positive for red cold foot with a faint dorsalis pedis pulse.    Past Medical History:   Diagnosis Date    3-vessel CAD 08/11/2020    Allergic rhinitis     Anemia     Anxiety disorder 04/27/2020    Arthritis     Asymmetrical sensorineural hearing loss 06/28/2017    Atherosclerosis of native artery of both lower extremities with intermittent claudication 07/18/2019    Avascular necrosis of femoral head, left 07/11/2020    right hip after surgery    Carotid stenosis     Chronic mucoid otitis media     Chronic rhinitis     COPD (chronic obstructive pulmonary disease)     Coronary artery disease     HEART BYPASS 2004    Crohn's disease of large intestine with other complication 07/30/2020    Chronic diarrhea Colonoscopy July 2020 revealed mild patchy scattered hemosiderin staining with inflammation more so in rectosigmoid area.  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    Iron deficiency anemia  07/14/2020    Mixed hearing loss of left ear     PAD (peripheral artery disease) 08/11/2020    Perianal abscess     Pernicious anemia 08/17/2020    took shots but never diagnosed with b12 deficiency    Personal history of alcoholism 08/11/2020    quit drinking in 2013    Prostatic hypertrophy 08/11/2020    Sensorineural hearing loss     Sepsis with acute renal failure 09/15/2020    Shortness of breath 05/27/2021    Tinnitus     Ventricular tachycardia, nonsustained 07/14/2020    Weight loss 07/11/2020       Allergies   Allergen Reactions    Ondansetron Anaphylaxis and GI Intolerance    Zofran [Ondansetron Hcl] Anaphylaxis    Lortab [Hydrocodone-Acetaminophen] Other (See Comments) and Hallucinations     CLOSTROPHOBIC    Allopurinol Other (See Comments)     Pain on right side       Past Surgical History:   Procedure Laterality Date    AORTOGRAM Right 4/25/2025    Procedure: RIGHT LOWER EXTREMITY ANGIOGRAM, SHOCKWAVE LITHOTRIPSY, BALLOON ANGIOPLASTY, MYNX CLOSURE;  Surgeon: Gil Pineda DO;  Location: [facility] HYBRID OR;  Service: Vascular;  Laterality: Right;    AORTOGRAM Left 5/22/2025    Procedure: LEFT LOWER EXTREMITY ANGIOGRAM, SHOCKWAVE LITHOTRIPSY, BALLOON ANGIOPLASTY, STENT PLACEMENT, MYNX CLOSURE;  Surgeon: Blayne Zabala MD;  Location: [facility] HYBRID OR;  Service: Vascular;  Laterality: Left;    AORTOGRAM Right 5/30/2025    Procedure: AORTOILIAC ANGIOGRAM WITH LEFT LOWER EXTREMITY ANGIOGRAM;  Surgeon: Gil Pineda DO;  Location: [facility] HYBRID OR;  Service: Vascular;  Laterality: Right;    ARTERY SURGERY  2021    right side on neck    CAROTID ENDARTERECTOMY Right 05/10/2021    Procedure: RIGHT CAROTID ENDARTERECTOMY WITH EEG;  Surgeon: Gil Pineda DO;  Location: [facility] HYBRID OR 12;  Service: Vascular;  Laterality: Right;    COLONOSCOPY N/A 07/02/2020    Procedure: COLONOSCOPY WITH ANESTHESIA;  Surgeon: Adrien Brewster MD;  Location: [facility] ENDOSCOPY;  Service: Gastroenterology;   Laterality: N/A;  pre op: diarrhea  post op: polyps  PCP: Joe Velasco MD    COLONOSCOPY N/A 10/13/2020    Procedure: COLONOSCOPY WITH ANESTHESIA;  Surgeon: Adrien Brewtser MD;  Location: [facility] ENDOSCOPY;  Service: Gastroenterology;  Laterality: N/A;  Pre: Chronic Diarrhea, Crohn's  Post: AVM  Dr. Neftali Velasco  CO2 Inflation Used    COLONOSCOPY N/A 12/08/2023    Procedure: COLONOSCOPY WITH ANESTHESIA;  Surgeon: Adrien Brewster MD;  Location: [facility] ENDOSCOPY;  Service: Gastroenterology;  Laterality: N/A;  pre chrone's disease  post sub optimal prep, polyp, chrone's      CORONARY ARTERY BYPASS GRAFT  2003    x3    ENDOSCOPY N/A 11/02/2021    Procedure: ESOPHAGOGASTRODUODENOSCOPY WITH ANESTHESIA;  Surgeon: Bridger Bell MD;  Location: [facility] ENDOSCOPY;  Service: Gastroenterology;  Laterality: N/A;  pre anemia;gi bleed  post  gi bleed;schatski ring  Dr. ERIC Velasco    ENDOSCOPY N/A 10/10/2023    Procedure: ESOPHAGOGASTRODUODENOSCOPY WITH ANESTHESIA;  Surgeon: Adrien Brewster MD;  Location: [facility] ENDOSCOPY;  Service: Gastroenterology;  Laterality: N/A;  preop; anemia  postop esophagitis ; R/O barretts   PCP Randall Beata    EYE SURGERY Bilateral     catorac    INCISION AND DRAINAGE PERIRECTAL ABSCESS N/A 03/03/2017    Procedure: INCISION AND DRAINAGE OF JEET ANAL ABSCESS;  Surgeon: Lynette Smith MD;  Location: [facility] OR;  Service:     INGUINAL HERNIA REPAIR Bilateral 06/27/2023    Procedure: INGUINAL HERNIA BILATERAL REPAIR LAPAROSCOPIC WITH DAVINCI ROBOT WITH MESH;  Surgeon: Tahira Rivera MD;  Location: [facility] OR;  Service: Robotics - DaVinci;  Laterality: Bilateral;    INSERTION HEMODIALYSIS CATHETER N/A 4/16/2025    Procedure: HEMODIALYSIS CATHETER PLACEMENT;  Surgeon: Gil Pineda DO;  Location: [facility] HYBRID OR;  Service: Vascular;  Laterality: N/A;    MYRINGOTOMY W/ TUBES Left 04/17/2017    06/10/2016    TONSILLECTOMY      TOTAL HIP ARTHROPLASTY Right 2006       Family  History   Problem Relation Age of Onset    Breast cancer Mother     Dementia Father     Glaucoma Father     No Known Problems Daughter     Colon polyps Neg Hx     Colon cancer Neg Hx        Social History     Socioeconomic History    Marital status:    Tobacco Use    Smoking status: Former     Current packs/day: 0.00     Average packs/day: 0.5 packs/day for 25.8 years (12.9 ttl pk-yrs)     Types: Cigarettes     Start date:      Quit date: 10/13/2013     Years since quittin.6     Passive exposure: Past    Smokeless tobacco: Never    Tobacco comments:     quit 2013   Vaping Use    Vaping status: Former    Substances: Nicotine    Devices: Pre-filled or refillable cartridge   Substance and Sexual Activity    Alcohol use: Not Currently    Drug use: No    Sexual activity: Not Currently     Partners: Female           Objective   Physical Exam  HEENT: Mucous membranes dry, EOMI PERRL, nares patent  Chest: Clear to auscultation bilaterally without wheezing rhonchi or rales  Cardiovascular: Regular rate and rhythm without murmur, no extremity edema.  Abdomen: Soft nontender good bowel sounds no hernia  Extremities: Right foot has some swelling around the ankle and the foot and there is erythema of the forefoot to the toes with blister formation on the third toe and slight formation on the 2nd and 4th toe on the dorsal aspect.  The skin is cold to the touch and I initially could not elicit a pulse and so we got the Doppler out and the nurse found a faint dorsalis pedis pulse.  Skin: As noted above.  Neuro: Alert and oriented x 3    Procedures       Aparna Dasilva on 2025  3:24 PM CDT   RLE: Heavy, calcific plaque, but patent arteries throughout. Monophasic and near continuous flow in distal tibial arteries.       ED Course      I spoke with Dr. Vickers nurse and he wants the patient admitted to the hospitalist and start heparin drip.  Spoke with hospitalist Dr. Gonzalez and he accepts this patient.  Family  informed.                                                 Medical Decision Making  Problems Addressed:  Vascular occlusion: complicated acute illness or injury    Amount and/or Complexity of Data Reviewed  Labs: ordered.  Radiology: ordered.  ECG/medicine tests: ordered.    Risk  Prescription drug management.  Decision regarding hospitalization.        Final diagnoses:   Vascular occlusion       ED Disposition  ED Disposition       ED Disposition   Decision to Admit    Condition   --    Comment   Level of Care: Med/Surg [1]   Diagnosis: Vascular occlusion [848492]   Admitting Physician: ARTIE SUBRAMANIAN [[id number]]   Attending Physician: ARTIE SUBRAMANIAN [[id number]]   Isolate for COVID?: No [0]   Certification: I Certify That Inpatient Hospital Services Are Medically Necessary For Greater Than 2 Midnights                 No follow-up provider specified.       Medication List      No changes were made to your prescriptions during this visit.            Rip Robbins PA-C  06/19/25 1454

## 2025-06-19 NOTE — TELEPHONE ENCOUNTER
Spoke with daughter and patient has had a worsening in condition with right foot becoming reddened with blisters present and cool to touch and left foot being red warmer and has flaking and peeling skin. I spoke with Melanie KATZ and she stated to keep appointment on 6/24/2025 and this may require amputation He should got to ED for evaluation with increasing pain and worsening of symptoms and she has verbalized understanding.

## 2025-06-19 NOTE — TELEPHONE ENCOUNTER
NURSE FROM [facility] CALLED ON PATIENT AND STATED HE DOES HAVE AN APPOINTMENT NEXT WEEK BUT HE HAS HAD CHANGES RECENTLY. SHE STATED HE IS COMPLAINING OF INCREASED PAIN IN RIGHT LEG, BLISTERING BETWEEN TOES. AREA HE WENT IN ON THE FOOT THAT HAS REOPENED. CHANGE IN TEMP. COOLER ON RIGHT AND WARMER TO LEFT OF THE FOOT. PATIENT IS NOT WANTING TO STAND ON LEG.

## 2025-06-19 NOTE — TELEPHONE ENCOUNTER
It appears he is going to need amputation.  Miriam was only able to balloon tibial.  Okay to have him come and see Miriam on 24th or sooner?  What do you think?

## 2025-06-20 ENCOUNTER — ANESTHESIA (OUTPATIENT)
Dept: PERIOP | Facility: HOSPITAL | Age: 75
DRG: 278 | End: 2025-06-20
Payer: MEDICARE

## 2025-06-20 ENCOUNTER — APPOINTMENT (OUTPATIENT)
Dept: INTERVENTIONAL RADIOLOGY/VASCULAR | Facility: HOSPITAL | Age: 75
DRG: 278 | End: 2025-06-20
Payer: MEDICARE

## 2025-06-20 ENCOUNTER — ANESTHESIA EVENT (OUTPATIENT)
Dept: PERIOP | Facility: HOSPITAL | Age: 75
DRG: 278 | End: 2025-06-20
Payer: MEDICARE

## 2025-06-20 LAB
ANION GAP SERPL CALCULATED.3IONS-SCNC: 10 MMOL/L (ref 5–15)
ANION GAP SERPL CALCULATED.3IONS-SCNC: 12 MMOL/L (ref 5–15)
ANISOCYTOSIS BLD QL: ABNORMAL
BASOPHILS # BLD MANUAL: 0.06 10*3/MM3 (ref 0–0.2)
BASOPHILS NFR BLD MANUAL: 1.1 % (ref 0–1.5)
BUN SERPL-MCNC: 11.1 MG/DL (ref 8–23)
BUN SERPL-MCNC: 38.9 MG/DL (ref 8–23)
BUN/CREAT SERPL: 12.8 (ref 7–25)
BUN/CREAT SERPL: 9.3 (ref 7–25)
CALCIUM SPEC-SCNC: 8.6 MG/DL (ref 8.6–10.5)
CALCIUM SPEC-SCNC: 8.7 MG/DL (ref 8.6–10.5)
CHLORIDE SERPL-SCNC: 101 MMOL/L (ref 98–107)
CHLORIDE SERPL-SCNC: 101 MMOL/L (ref 98–107)
CO2 SERPL-SCNC: 24 MMOL/L (ref 22–29)
CO2 SERPL-SCNC: 27 MMOL/L (ref 22–29)
CREAT SERPL-MCNC: 1.2 MG/DL (ref 0.76–1.27)
CREAT SERPL-MCNC: 3.03 MG/DL (ref 0.76–1.27)
DEPRECATED RDW RBC AUTO: 68.5 FL (ref 37–54)
EGFRCR SERPLBLD CKD-EPI 2021: 20.5 ML/MIN/1.73
EGFRCR SERPLBLD CKD-EPI 2021: 62.3 ML/MIN/1.73
EOSINOPHIL # BLD MANUAL: 0.34 10*3/MM3 (ref 0–0.4)
EOSINOPHIL NFR BLD MANUAL: 6.3 % (ref 0.3–6.2)
ERYTHROCYTE [DISTWIDTH] IN BLOOD BY AUTOMATED COUNT: 18.3 % (ref 12.3–15.4)
GIANT PLATELETS: ABNORMAL
GLUCOSE SERPL-MCNC: 102 MG/DL (ref 65–99)
GLUCOSE SERPL-MCNC: 84 MG/DL (ref 65–99)
HCT VFR BLD AUTO: 24.8 % (ref 37.5–51)
HGB BLD-MCNC: 7.9 G/DL (ref 13–17.7)
LYMPHOCYTES # BLD MANUAL: 0.63 10*3/MM3 (ref 0.7–3.1)
LYMPHOCYTES NFR BLD MANUAL: 3.2 % (ref 5–12)
MCH RBC QN AUTO: 32.6 PG (ref 26.6–33)
MCHC RBC AUTO-ENTMCNC: 31.9 G/DL (ref 31.5–35.7)
MCV RBC AUTO: 102.5 FL (ref 79–97)
METAMYELOCYTES NFR BLD MANUAL: 2.1 % (ref 0–0)
MONOCYTES # BLD: 0.17 10*3/MM3 (ref 0.1–0.9)
NEUTROPHILS # BLD AUTO: 4.09 10*3/MM3 (ref 1.7–7)
NEUTROPHILS NFR BLD MANUAL: 73.7 % (ref 42.7–76)
NEUTS BAND NFR BLD MANUAL: 2.1 % (ref 0–5)
PLATELET # BLD AUTO: 111 10*3/MM3 (ref 140–450)
PMV BLD AUTO: 9.9 FL (ref 6–12)
POLYCHROMASIA BLD QL SMEAR: ABNORMAL
POTASSIUM SERPL-SCNC: 3.7 MMOL/L (ref 3.5–5.2)
POTASSIUM SERPL-SCNC: 4 MMOL/L (ref 3.5–5.2)
QT INTERVAL: 466 MS
QTC INTERVAL: 488 MS
RBC # BLD AUTO: 2.42 10*6/MM3 (ref 4.14–5.8)
SMALL PLATELETS BLD QL SMEAR: ABNORMAL
SODIUM SERPL-SCNC: 137 MMOL/L (ref 136–145)
SODIUM SERPL-SCNC: 138 MMOL/L (ref 136–145)
UFH PPP CHRO-ACNC: 0.39 IU/ML (ref 0.3–0.7)
UFH PPP CHRO-ACNC: <0.1 IU/ML (ref 0.3–0.7)
VARIANT LYMPHS NFR BLD MANUAL: 11.6 % (ref 19.6–45.3)
WBC MORPH BLD: NORMAL
WBC NRBC COR # BLD AUTO: 5.4 10*3/MM3 (ref 3.4–10.8)

## 2025-06-20 PROCEDURE — 25010000002 HEPARIN (PORCINE) PER 1000 UNITS: Performed by: FAMILY MEDICINE

## 2025-06-20 PROCEDURE — C1725 CATH, TRANSLUMIN NON-LASER: HCPCS | Performed by: STUDENT IN AN ORGANIZED HEALTH CARE EDUCATION/TRAINING PROGRAM

## 2025-06-20 PROCEDURE — 25010000002 BUPIVACAINE (PF) 0.5 % SOLUTION: Performed by: STUDENT IN AN ORGANIZED HEALTH CARE EDUCATION/TRAINING PROGRAM

## 2025-06-20 PROCEDURE — 85007 BL SMEAR W/DIFF WBC COUNT: CPT | Performed by: FAMILY MEDICINE

## 2025-06-20 PROCEDURE — 63710000001 PROMETHAZINE PER 25 MG: Performed by: FAMILY MEDICINE

## 2025-06-20 PROCEDURE — 37228 PR REVSC OPN/PRQ TIB/PERO W/ANGIOPLASTY UNI: CPT | Performed by: STUDENT IN AN ORGANIZED HEALTH CARE EDUCATION/TRAINING PROGRAM

## 2025-06-20 PROCEDURE — 04FM3ZZ FRAGMENTATION OF RIGHT POPLITEAL ARTERY, PERCUTANEOUS APPROACH: ICD-10-PCS | Performed by: STUDENT IN AN ORGANIZED HEALTH CARE EDUCATION/TRAINING PROGRAM

## 2025-06-20 PROCEDURE — 25010000002 EPOETIN ALFA-EPBX 10000 UNIT/ML SOLUTION: Performed by: STUDENT IN AN ORGANIZED HEALTH CARE EDUCATION/TRAINING PROGRAM

## 2025-06-20 PROCEDURE — 75710 ARTERY X-RAYS ARM/LEG: CPT

## 2025-06-20 PROCEDURE — 25010000002 LIDOCAINE PF 2% 2 % SOLUTION: Performed by: NURSE ANESTHETIST, CERTIFIED REGISTERED

## 2025-06-20 PROCEDURE — 85520 HEPARIN ASSAY: CPT | Performed by: FAMILY MEDICINE

## 2025-06-20 PROCEDURE — 80048 BASIC METABOLIC PNL TOTAL CA: CPT | Performed by: ANESTHESIOLOGY

## 2025-06-20 PROCEDURE — 37226 PR REVSC OPN/PRQ FEM/POP W/STNT/ANGIOP SM VSL: CPT | Performed by: STUDENT IN AN ORGANIZED HEALTH CARE EDUCATION/TRAINING PROGRAM

## 2025-06-20 PROCEDURE — 25810000003 SODIUM CHLORIDE 0.9 % SOLUTION: Performed by: ANESTHESIOLOGY

## 2025-06-20 PROCEDURE — C1887 CATHETER, GUIDING: HCPCS | Performed by: STUDENT IN AN ORGANIZED HEALTH CARE EDUCATION/TRAINING PROGRAM

## 2025-06-20 PROCEDURE — 76937 US GUIDE VASCULAR ACCESS: CPT | Performed by: STUDENT IN AN ORGANIZED HEALTH CARE EDUCATION/TRAINING PROGRAM

## 2025-06-20 PROCEDURE — C1894 INTRO/SHEATH, NON-LASER: HCPCS | Performed by: STUDENT IN AN ORGANIZED HEALTH CARE EDUCATION/TRAINING PROGRAM

## 2025-06-20 PROCEDURE — 047M3ZZ DILATION OF RIGHT POPLITEAL ARTERY, PERCUTANEOUS APPROACH: ICD-10-PCS | Performed by: STUDENT IN AN ORGANIZED HEALTH CARE EDUCATION/TRAINING PROGRAM

## 2025-06-20 PROCEDURE — 5A1D70Z PERFORMANCE OF URINARY FILTRATION, INTERMITTENT, LESS THAN 6 HOURS PER DAY: ICD-10-PCS | Performed by: INTERNAL MEDICINE

## 2025-06-20 PROCEDURE — 047R3ZZ DILATION OF RIGHT POSTERIOR TIBIAL ARTERY, PERCUTANEOUS APPROACH: ICD-10-PCS | Performed by: STUDENT IN AN ORGANIZED HEALTH CARE EDUCATION/TRAINING PROGRAM

## 2025-06-20 PROCEDURE — 25010000002 HEPARIN (PORCINE) PER 1000 UNITS: Performed by: NURSE ANESTHETIST, CERTIFIED REGISTERED

## 2025-06-20 PROCEDURE — 25010000002 PROTAMINE SULFATE PER 10 MG: Performed by: NURSE ANESTHETIST, CERTIFIED REGISTERED

## 2025-06-20 PROCEDURE — B41F1ZZ FLUOROSCOPY OF RIGHT LOWER EXTREMITY ARTERIES USING LOW OSMOLAR CONTRAST: ICD-10-PCS | Performed by: STUDENT IN AN ORGANIZED HEALTH CARE EDUCATION/TRAINING PROGRAM

## 2025-06-20 PROCEDURE — C1760 CLOSURE DEV, VASC: HCPCS | Performed by: STUDENT IN AN ORGANIZED HEALTH CARE EDUCATION/TRAINING PROGRAM

## 2025-06-20 PROCEDURE — 25010000002 HYDROMORPHONE 1 MG/ML SOLUTION: Performed by: NURSE ANESTHETIST, CERTIFIED REGISTERED

## 2025-06-20 PROCEDURE — C1769 GUIDE WIRE: HCPCS | Performed by: STUDENT IN AN ORGANIZED HEALTH CARE EDUCATION/TRAINING PROGRAM

## 2025-06-20 PROCEDURE — 25010000002 PROPOFOL 10 MG/ML EMULSION: Performed by: NURSE ANESTHETIST, CERTIFIED REGISTERED

## 2025-06-20 PROCEDURE — 047T3ZZ DILATION OF RIGHT PERONEAL ARTERY, PERCUTANEOUS APPROACH: ICD-10-PCS | Performed by: STUDENT IN AN ORGANIZED HEALTH CARE EDUCATION/TRAINING PROGRAM

## 2025-06-20 PROCEDURE — 85025 COMPLETE CBC W/AUTO DIFF WBC: CPT | Performed by: FAMILY MEDICINE

## 2025-06-20 PROCEDURE — 25010000002 HYDROMORPHONE PER 4 MG: Performed by: FAMILY MEDICINE

## 2025-06-20 PROCEDURE — 25810000003 SODIUM CHLORIDE 0.9 % SOLUTION: Performed by: STUDENT IN AN ORGANIZED HEALTH CARE EDUCATION/TRAINING PROGRAM

## 2025-06-20 PROCEDURE — 25010000002 HEPARIN (PORCINE) PER 1000 UNITS: Performed by: STUDENT IN AN ORGANIZED HEALTH CARE EDUCATION/TRAINING PROGRAM

## 2025-06-20 PROCEDURE — 25510000001 IOPAMIDOL 61 % SOLUTION: Performed by: STUDENT IN AN ORGANIZED HEALTH CARE EDUCATION/TRAINING PROGRAM

## 2025-06-20 PROCEDURE — 80048 BASIC METABOLIC PNL TOTAL CA: CPT | Performed by: FAMILY MEDICINE

## 2025-06-20 PROCEDURE — 75710 ARTERY X-RAYS ARM/LEG: CPT | Performed by: STUDENT IN AN ORGANIZED HEALTH CARE EDUCATION/TRAINING PROGRAM

## 2025-06-20 PROCEDURE — 76000 FLUOROSCOPY <1 HR PHYS/QHP: CPT

## 2025-06-20 PROCEDURE — 25010000002 HEPARIN (PORCINE) PER 1000 UNITS: Performed by: INTERNAL MEDICINE

## 2025-06-20 PROCEDURE — 25010000002 PROPOFOL 1000 MG/100ML EMULSION: Performed by: NURSE ANESTHETIST, CERTIFIED REGISTERED

## 2025-06-20 PROCEDURE — 25010000002 CEFAZOLIN PER 500 MG: Performed by: NURSE ANESTHETIST, CERTIFIED REGISTERED

## 2025-06-20 PROCEDURE — 25010000002 HYDROMORPHONE PER 4 MG: Performed by: STUDENT IN AN ORGANIZED HEALTH CARE EDUCATION/TRAINING PROGRAM

## 2025-06-20 RX ORDER — SODIUM CHLORIDE 0.9 % (FLUSH) 0.9 %
10 SYRINGE (ML) INJECTION EVERY 12 HOURS SCHEDULED
Status: DISCONTINUED | OUTPATIENT
Start: 2025-06-20 | End: 2025-06-24

## 2025-06-20 RX ORDER — HYDROMORPHONE HYDROCHLORIDE 1 MG/ML
0.25 INJECTION, SOLUTION INTRAMUSCULAR; INTRAVENOUS; SUBCUTANEOUS
Status: DISCONTINUED | OUTPATIENT
Start: 2025-06-20 | End: 2025-06-20 | Stop reason: HOSPADM

## 2025-06-20 RX ORDER — FENTANYL CITRATE 50 UG/ML
25 INJECTION, SOLUTION INTRAMUSCULAR; INTRAVENOUS
Status: DISCONTINUED | OUTPATIENT
Start: 2025-06-20 | End: 2025-06-20 | Stop reason: HOSPADM

## 2025-06-20 RX ORDER — OXYCODONE AND ACETAMINOPHEN 5; 325 MG/1; MG/1
1 TABLET ORAL ONCE AS NEEDED
Status: DISCONTINUED | OUTPATIENT
Start: 2025-06-20 | End: 2025-06-20 | Stop reason: HOSPADM

## 2025-06-20 RX ORDER — SODIUM CHLORIDE 0.9 % (FLUSH) 0.9 %
3 SYRINGE (ML) INJECTION EVERY 12 HOURS SCHEDULED
Status: DISCONTINUED | OUTPATIENT
Start: 2025-06-20 | End: 2025-06-20 | Stop reason: HOSPADM

## 2025-06-20 RX ORDER — SODIUM CHLORIDE 9 MG/ML
40 INJECTION, SOLUTION INTRAVENOUS AS NEEDED
Status: DISCONTINUED | OUTPATIENT
Start: 2025-06-20 | End: 2025-06-24

## 2025-06-20 RX ORDER — PROPOFOL 10 MG/ML
INJECTION, EMULSION INTRAVENOUS AS NEEDED
Status: DISCONTINUED | OUTPATIENT
Start: 2025-06-20 | End: 2025-06-20 | Stop reason: SURG

## 2025-06-20 RX ORDER — SODIUM CHLORIDE 0.9 % (FLUSH) 0.9 %
3-10 SYRINGE (ML) INJECTION AS NEEDED
Status: DISCONTINUED | OUTPATIENT
Start: 2025-06-20 | End: 2025-06-20 | Stop reason: HOSPADM

## 2025-06-20 RX ORDER — HEPARIN SODIUM 1000 [USP'U]/ML
INJECTION, SOLUTION INTRAVENOUS; SUBCUTANEOUS AS NEEDED
Status: DISCONTINUED | OUTPATIENT
Start: 2025-06-20 | End: 2025-06-20 | Stop reason: SURG

## 2025-06-20 RX ORDER — BUPIVACAINE HYDROCHLORIDE 5 MG/ML
INJECTION, SOLUTION EPIDURAL; INTRACAUDAL; PERINEURAL AS NEEDED
Status: DISCONTINUED | OUTPATIENT
Start: 2025-06-20 | End: 2025-06-20 | Stop reason: HOSPADM

## 2025-06-20 RX ORDER — IOPAMIDOL 612 MG/ML
INJECTION, SOLUTION INTRAVASCULAR AS NEEDED
Status: DISCONTINUED | OUTPATIENT
Start: 2025-06-20 | End: 2025-06-20 | Stop reason: HOSPADM

## 2025-06-20 RX ORDER — PROMETHAZINE HYDROCHLORIDE 25 MG/1
12.5 TABLET ORAL EVERY 6 HOURS PRN
Status: DISPENSED | OUTPATIENT
Start: 2025-06-20

## 2025-06-20 RX ORDER — CEFAZOLIN SODIUM 1 G/3ML
INJECTION, POWDER, FOR SOLUTION INTRAMUSCULAR; INTRAVENOUS AS NEEDED
Status: DISCONTINUED | OUTPATIENT
Start: 2025-06-20 | End: 2025-06-20 | Stop reason: SURG

## 2025-06-20 RX ORDER — LABETALOL HYDROCHLORIDE 5 MG/ML
5 INJECTION, SOLUTION INTRAVENOUS
Status: DISCONTINUED | OUTPATIENT
Start: 2025-06-20 | End: 2025-06-20 | Stop reason: HOSPADM

## 2025-06-20 RX ORDER — NALOXONE HCL 0.4 MG/ML
0.04 VIAL (ML) INJECTION AS NEEDED
Status: DISCONTINUED | OUTPATIENT
Start: 2025-06-20 | End: 2025-06-20 | Stop reason: HOSPADM

## 2025-06-20 RX ORDER — PROTAMINE SULFATE 10 MG/ML
INJECTION, SOLUTION INTRAVENOUS AS NEEDED
Status: DISCONTINUED | OUTPATIENT
Start: 2025-06-20 | End: 2025-06-20 | Stop reason: SURG

## 2025-06-20 RX ORDER — SODIUM CHLORIDE 0.9 % (FLUSH) 0.9 %
10 SYRINGE (ML) INJECTION AS NEEDED
Status: DISCONTINUED | OUTPATIENT
Start: 2025-06-20 | End: 2025-06-24

## 2025-06-20 RX ORDER — SODIUM CHLORIDE 9 MG/ML
40 INJECTION, SOLUTION INTRAVENOUS AS NEEDED
Status: DISCONTINUED | OUTPATIENT
Start: 2025-06-20 | End: 2025-06-20 | Stop reason: HOSPADM

## 2025-06-20 RX ORDER — LIDOCAINE HYDROCHLORIDE 20 MG/ML
INJECTION, SOLUTION EPIDURAL; INFILTRATION; INTRACAUDAL; PERINEURAL AS NEEDED
Status: DISCONTINUED | OUTPATIENT
Start: 2025-06-20 | End: 2025-06-20 | Stop reason: SURG

## 2025-06-20 RX ORDER — FLUMAZENIL 0.1 MG/ML
0.2 INJECTION INTRAVENOUS AS NEEDED
Status: DISCONTINUED | OUTPATIENT
Start: 2025-06-20 | End: 2025-06-20 | Stop reason: HOSPADM

## 2025-06-20 RX ORDER — SODIUM CHLORIDE 9 MG/ML
50 INJECTION, SOLUTION INTRAVENOUS CONTINUOUS
Status: DISCONTINUED | OUTPATIENT
Start: 2025-06-20 | End: 2025-06-21

## 2025-06-20 RX ORDER — HEPARIN SODIUM 1000 [USP'U]/ML
3200 INJECTION, SOLUTION INTRAVENOUS; SUBCUTANEOUS
Status: COMPLETED | OUTPATIENT
Start: 2025-06-20 | End: 2025-06-20

## 2025-06-20 RX ADMIN — LIDOCAINE HYDROCHLORIDE 60 MG: 20 INJECTION, SOLUTION EPIDURAL; INFILTRATION; INTRACAUDAL; PERINEURAL at 15:21

## 2025-06-20 RX ADMIN — NIFEDIPINE 120 MG: 60 TABLET, FILM COATED, EXTENDED RELEASE ORAL at 19:38

## 2025-06-20 RX ADMIN — HEPARIN SODIUM 6000 UNITS: 1000 INJECTION, SOLUTION INTRAVENOUS; SUBCUTANEOUS at 15:39

## 2025-06-20 RX ADMIN — PROPOFOL 20 MG: 10 INJECTION, EMULSION INTRAVENOUS at 15:21

## 2025-06-20 RX ADMIN — ATORVASTATIN CALCIUM 10 MG: 10 TABLET, FILM COATED ORAL at 20:30

## 2025-06-20 RX ADMIN — PROPOFOL 75 MCG/KG/MIN: 10 INJECTION, EMULSION INTRAVENOUS at 15:22

## 2025-06-20 RX ADMIN — HEPARIN SODIUM 2000 UNITS: 1000 INJECTION, SOLUTION INTRAVENOUS; SUBCUTANEOUS at 16:15

## 2025-06-20 RX ADMIN — HYDROMORPHONE HYDROCHLORIDE 0.5 MG: 1 INJECTION, SOLUTION INTRAMUSCULAR; INTRAVENOUS; SUBCUTANEOUS at 20:30

## 2025-06-20 RX ADMIN — HYDRALAZINE HYDROCHLORIDE 100 MG: 50 TABLET ORAL at 20:30

## 2025-06-20 RX ADMIN — MONTELUKAST SODIUM 10 MG: 10 TABLET, COATED ORAL at 20:30

## 2025-06-20 RX ADMIN — OXYCODONE AND ACETAMINOPHEN 1 TABLET: 5; 325 TABLET ORAL at 08:36

## 2025-06-20 RX ADMIN — CLONIDINE HYDROCHLORIDE 0.3 MG: 0.1 TABLET ORAL at 20:31

## 2025-06-20 RX ADMIN — Medication 10 ML: at 20:31

## 2025-06-20 RX ADMIN — GABAPENTIN 300 MG: 300 CAPSULE ORAL at 20:31

## 2025-06-20 RX ADMIN — OXYCODONE AND ACETAMINOPHEN 1 TABLET: 5; 325 TABLET ORAL at 18:51

## 2025-06-20 RX ADMIN — TAMSULOSIN HYDROCHLORIDE 0.4 MG: 0.4 CAPSULE ORAL at 20:30

## 2025-06-20 RX ADMIN — SODIUM CHLORIDE 50 ML/HR: 9 INJECTION, SOLUTION INTRAVENOUS at 14:41

## 2025-06-20 RX ADMIN — CARVEDILOL 12.5 MG: 6.25 TABLET, FILM COATED ORAL at 19:38

## 2025-06-20 RX ADMIN — SODIUM CHLORIDE 50 ML/HR: 9 INJECTION, SOLUTION INTRAVENOUS at 18:51

## 2025-06-20 RX ADMIN — ISOSORBIDE DINITRATE 10 MG: 10 TABLET ORAL at 19:39

## 2025-06-20 RX ADMIN — DOCUSATE SODIUM 50 MG AND SENNOSIDES 8.6 MG 2 TABLET: 8.6; 5 TABLET, FILM COATED ORAL at 20:31

## 2025-06-20 RX ADMIN — EPOETIN ALFA-EPBX 10000 UNITS: 10000 INJECTION, SOLUTION INTRAVENOUS; SUBCUTANEOUS at 20:39

## 2025-06-20 RX ADMIN — PROMETHAZINE HYDROCHLORIDE 12.5 MG: 25 TABLET ORAL at 22:43

## 2025-06-20 RX ADMIN — HYDRALAZINE HYDROCHLORIDE 100 MG: 50 TABLET ORAL at 19:38

## 2025-06-20 RX ADMIN — HYDROMORPHONE HYDROCHLORIDE 1 MG: 1 INJECTION, SOLUTION INTRAMUSCULAR; INTRAVENOUS; SUBCUTANEOUS at 17:16

## 2025-06-20 RX ADMIN — PROTAMINE SULFATE 20 MG: 10 INJECTION, SOLUTION INTRAVENOUS at 17:01

## 2025-06-20 RX ADMIN — HYDROMORPHONE HYDROCHLORIDE 0.5 MG: 1 INJECTION, SOLUTION INTRAMUSCULAR; INTRAVENOUS; SUBCUTANEOUS at 13:48

## 2025-06-20 RX ADMIN — CEFAZOLIN 2 G: 1 INJECTION, POWDER, FOR SOLUTION INTRAMUSCULAR; INTRAVENOUS at 15:29

## 2025-06-20 RX ADMIN — HEPARIN SODIUM 3200 UNITS: 1000 INJECTION, SOLUTION INTRAVENOUS; SUBCUTANEOUS at 12:50

## 2025-06-20 RX ADMIN — HYDROMORPHONE HYDROCHLORIDE 0.5 MG: 1 INJECTION, SOLUTION INTRAMUSCULAR; INTRAVENOUS; SUBCUTANEOUS at 11:07

## 2025-06-20 RX ADMIN — HEPARIN SODIUM 3090 UNITS: 1000 INJECTION INTRAVENOUS; SUBCUTANEOUS at 04:29

## 2025-06-20 RX ADMIN — Medication 10 ML: at 08:44

## 2025-06-20 NOTE — CONSULTS
Ricardo Hugo  [id number]  [id number]  361/1  Donal Rodriguez,*  6/19/2025    Chief Complaint   Patient presents with    Leg Pain       HPI: Ricardo Hugo is a 75 y.o. male who presented with right lower extremity rest pain.  He is a patient of Dr. Gutierres who is well-known to the service.  He has previously undergone bilateral lower extremity arteriograms, right carotid endarterectomy, and a PermCath placement.  Most pertinent past surgical history is the right lower extremity arteriogram with PTA of the right external iliac artery, shockwave of the right SFA, and PTA of the right SFA.  This occurred on 4/25/2025 for rest pain.  Patient states that after the procedure he had no further rest pain in the right lower extremity until 1 week ago.  Patient states that the rest pain is progressively gotten worse which prompted him to present to the ER.  His motor is intact and sensation is slightly diminished.  He denies any tissue loss.  He has been taking aspirin Plavix and statin.    Past Medical History:   Diagnosis Date    3-vessel CAD 08/11/2020    Allergic rhinitis     Anemia     Anxiety disorder 04/27/2020    Arthritis     Asymmetrical sensorineural hearing loss 06/28/2017    Atherosclerosis of native artery of both lower extremities with intermittent claudication 07/18/2019    Avascular necrosis of femoral head, left 07/11/2020    right hip after surgery    Carotid stenosis     Chronic mucoid otitis media     Chronic rhinitis     COPD (chronic obstructive pulmonary disease)     Coronary artery disease     HEART BYPASS 2004    Crohn's disease of large intestine with other complication 07/30/2020    Chronic diarrhea Colonoscopy July 2020 revealed mild patchy scattered hemosiderin staining with inflammation more so in rectosigmoid area.  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    Iron deficiency anemia 07/14/2020    Mixed hearing loss of left ear     PAD (peripheral artery disease) 08/11/2020    Perianal abscess     Pernicious anemia 08/17/2020    took shots but never diagnosed with b12 deficiency    Personal history of alcoholism 08/11/2020    quit drinking in 2013    Prostatic hypertrophy 08/11/2020    Sensorineural hearing loss     Sepsis with acute renal failure 09/15/2020    Shortness of breath 05/27/2021    Tinnitus     Ventricular tachycardia, nonsustained 07/14/2020    Weight loss 07/11/2020       Past Surgical History:   Procedure Laterality Date    AORTOGRAM Right 4/25/2025    Procedure: RIGHT LOWER EXTREMITY ANGIOGRAM, SHOCKWAVE LITHOTRIPSY, BALLOON ANGIOPLASTY, MYNX CLOSURE;  Surgeon: Gil Pineda DO;  Location: [facility] HYBRID OR;  Service: Vascular;  Laterality: Right;    AORTOGRAM Left 5/22/2025    Procedure: LEFT LOWER EXTREMITY ANGIOGRAM, SHOCKWAVE LITHOTRIPSY, BALLOON ANGIOPLASTY, STENT PLACEMENT, MYNX CLOSURE;  Surgeon: Blayne Zabala MD;  Location: [facility] OR;  Service: Vascular;  Laterality: Left;    AORTOGRAM Right 5/30/2025    Procedure: AORTOILIAC ANGIOGRAM WITH LEFT LOWER EXTREMITY ANGIOGRAM;  Surgeon: Gil Pineda DO;  Location: [facility] HYBRID OR;  Service: Vascular;  Laterality: Right;    ARTERY SURGERY  2021    right side on neck    CAROTID ENDARTERECTOMY Right 05/10/2021    Procedure: RIGHT CAROTID ENDARTERECTOMY WITH EEG;  Surgeon: Gil Pineda DO;  Location: [facility] HYBRID OR 12;  Service: Vascular;  Laterality: Right;    COLONOSCOPY N/A 07/02/2020    Procedure: COLONOSCOPY WITH ANESTHESIA;  Surgeon: Adrien Brewster MD;  Location: [facility] ENDOSCOPY;  Service: Gastroenterology;  Laterality: N/A;   pre op: diarrhea  post op: polyps  PCP: Joe Velasco MD    COLONOSCOPY N/A 10/13/2020    Procedure: COLONOSCOPY WITH ANESTHESIA;  Surgeon: Adrien Brewster MD;  Location: [facility] ENDOSCOPY;  Service: Gastroenterology;  Laterality: N/A;  Pre: Chronic Diarrhea, Crohn's  Post: AVM  Dr. Neftali Velasco  CO2 Inflation Used    COLONOSCOPY N/A 12/08/2023    Procedure: COLONOSCOPY WITH ANESTHESIA;  Surgeon: Adrien Brewster MD;  Location: [facility] ENDOSCOPY;  Service: Gastroenterology;  Laterality: N/A;  pre chrone's disease  post sub optimal prep, polyp, chrone's      CORONARY ARTERY BYPASS GRAFT  2003    x3    ENDOSCOPY N/A 11/02/2021    Procedure: ESOPHAGOGASTRODUODENOSCOPY WITH ANESTHESIA;  Surgeon: Bridger Bell MD;  Location: [facility] ENDOSCOPY;  Service: Gastroenterology;  Laterality: N/A;  pre anemia;gi bleed  post  gi bleed;schatski ring  Dr. ERIC Velasco    ENDOSCOPY N/A 10/10/2023    Procedure: ESOPHAGOGASTRODUODENOSCOPY WITH ANESTHESIA;  Surgeon: Adrien Brewster MD;  Location: [facility] ENDOSCOPY;  Service: Gastroenterology;  Laterality: N/A;  preop; anemia  postop esophagitis ; R/O barretts   PCP Randall Beata    EYE SURGERY Bilateral     catorac    INCISION AND DRAINAGE PERIRECTAL ABSCESS N/A 03/03/2017    Procedure: INCISION AND DRAINAGE OF JEET ANAL ABSCESS;  Surgeon: Lynette Smith MD;  Location: [facility] OR;  Service:     INGUINAL HERNIA REPAIR Bilateral 06/27/2023    Procedure: INGUINAL HERNIA BILATERAL REPAIR LAPAROSCOPIC WITH DAVINCI ROBOT WITH MESH;  Surgeon: Tahira Rivera MD;  Location: [facility] OR;  Service: Robotics - DaVinci;  Laterality: Bilateral;    INSERTION HEMODIALYSIS CATHETER N/A 4/16/2025    Procedure: HEMODIALYSIS CATHETER PLACEMENT;  Surgeon: Gil Pineda DO;  Location: [facility] HYBRID OR;  Service: Vascular;  Laterality: N/A;    MYRINGOTOMY W/ TUBES Left 04/17/2017    06/10/2016    TONSILLECTOMY      TOTAL HIP ARTHROPLASTY Right 2006       Family History   Problem  "Relation Age of Onset    Breast cancer Mother     Dementia Father     Glaucoma Father     No Known Problems Daughter     Colon polyps Neg Hx     Colon cancer Neg Hx        Social History     Socioeconomic History    Marital status:    Tobacco Use    Smoking status: Former     Current packs/day: 0.00     Average packs/day: 0.5 packs/day for 25.8 years (12.9 ttl pk-yrs)     Types: Cigarettes     Start date:      Quit date: 10/13/2013     Years since quittin.6     Passive exposure: Past    Smokeless tobacco: Never    Tobacco comments:     quit 2013   Vaping Use    Vaping status: Former    Substances: Nicotine    Devices: Pre-filled or refillable cartridge   Substance and Sexual Activity    Alcohol use: Not Currently    Drug use: No    Sexual activity: Not Currently     Partners: Female       Allergies   Allergen Reactions    Ondansetron Anaphylaxis and GI Intolerance    Zofran [Ondansetron Hcl] Anaphylaxis    Lortab [Hydrocodone-Acetaminophen] Other (See Comments) and Hallucinations     CLOSTROPHOBIC    Allopurinol Other (See Comments)     Pain on right side       Hospital Medications (active)         Dose Frequency Start End    acetaminophen (TYLENOL) 160 MG/5ML oral solution 650 mg 650 mg Every 4 Hours PRN 2025 --    Admin Instructions: If given for fever, use fever parameter: fever greater than 100.4 °F  Based on patient request - if ordered for moderate or severe pain, provider allows for administration of a medication prescribed for a lower pain scale.    Do not exceed 4 grams of acetaminophen in a 24 hr period. Max dose of 2gm for AST/ALT greater than 120 units/L.    If given for pain, use the following pain scale:   Mild Pain = Pain Score of 1-3, CPOT 1-2  Moderate Pain = Pain Score of 4-6, CPOT 3-4  Severe Pain = Pain Score of 7-10, CPOT 5-8    Route: Oral    Linked Group 1: Placed in \"Or\" Linked Group        acetaminophen (TYLENOL) suppository 650 mg 650 mg Every 4 Hours PRN 2025 -- " "   Admin Instructions: If given for fever, use fever parameter: fever greater than 100.4 °F  Based on patient request - if ordered for moderate or severe pain, provider allows for administration of a medication prescribed for a lower pain scale.    Do not exceed 4 grams of acetaminophen in a 24 hr period. Max dose of 2gm for AST/ALT greater than 120 units/L.    If given for pain, use the following pain scale:   Mild Pain = Pain Score of 1-3, CPOT 1-2  Moderate Pain = Pain Score of 4-6, CPOT 3-4  Severe Pain = Pain Score of 7-10, CPOT 5-8    Route: Rectal    Linked Group 1: Placed in \"Or\" Linked Group        acetaminophen (TYLENOL) tablet 650 mg 650 mg Every 4 Hours PRN 6/19/2025 --    Admin Instructions: If given for fever, use fever parameter: fever greater than 100.4 °F  Based on patient request - if ordered for moderate or severe pain, provider allows for administration of a medication prescribed for a lower pain scale.    Do not exceed 4 grams of acetaminophen in a 24 hr period. Max dose of 2gm for AST/ALT greater than 120 units/L.    If given for pain, use the following pain scale:   Mild Pain = Pain Score of 1-3, CPOT 1-2  Moderate Pain = Pain Score of 4-6, CPOT 3-4  Severe Pain = Pain Score of 7-10, CPOT 5-8    Route: Oral    Linked Group 1: Placed in \"Or\" Linked Group        ALPRAZolam (XANAX) tablet 0.25 mg 0.25 mg Nightly PRN 6/19/2025 --    Admin Instructions: (COURTNEY) Caution: Look alike/sound alike drug alert.   Avoid grapefruit juice    Route: Oral    aspirin EC tablet 81 mg 81 mg Daily 6/19/2025 --    Admin Instructions: Do not crush or chew the capsules or tablets. The drug may not work as designed if the capsule or tablet is crushed or chewed. Swallow whole.  Do not exceed 4 grams of aspirin in a 24 hr period.    If given for pain, use the following pain scale:   Mild Pain = Pain Score of 1-3, CPOT 1-2  Moderate Pain = Pain Score of 4-6, CPOT 3-4  Severe Pain = Pain Score of 7-10, CPOT 5-8    Route: " "Oral    atorvastatin (LIPITOR) tablet 10 mg 10 mg Nightly 6/19/2025 --    Admin Instructions: Avoid grapefruit juice.    Route: Oral    bisacodyl (DULCOLAX) EC tablet 5 mg 5 mg Daily PRN 6/19/2025 --    Admin Instructions: Use if no bowel movement after 12 hours.  Swallow whole. Do not crush, split, or chew tablet.    Route: Oral    Linked Group 2: Placed in \"And\" Linked Group        bisacodyl (DULCOLAX) suppository 10 mg 10 mg Daily PRN 6/19/2025 --    Admin Instructions: Use if no bowel movement after 12 hours.  Hold for diarrhea    Route: Rectal    Linked Group 2: Placed in \"And\" Linked Group        carvedilol (COREG) tablet 12.5 mg 12.5 mg 2 Times Daily With Meals 6/19/2025 --    Admin Instructions: Hold for SBP less than 100, DBP less than 60, or heart rate less than 50. If a dose is held, please contact the provider.  Give with food.    Route: Oral    cloNIDine (CATAPRES) tablet 0.3 mg 0.3 mg 3 times daily 6/19/2025 --    Admin Instructions: Hold for SBP less than 100, DBP less than 60, or heart rate less than 50. If a dose is held, please contact the provider.  Caution: Look alike/sound alike drug alert.    Route: Oral    gabapentin (NEURONTIN) capsule 300 mg 300 mg Nightly 6/19/2025 --    Admin Instructions: (COURTNEY)    Route: Oral    heparin (porcine) injection 1,550 Units 25 Units/kg × 61.8 kg As Needed 6/19/2025 --    Admin Instructions: Per Heparin Nomogram: Anti Xa 0.1 - 0.19 (max 2000 units)     Route: Intravenous    heparin (porcine) injection 3,090 Units 50 Units/kg × 61.8 kg As Needed 6/19/2025 --    Admin Instructions: Per Heparin Nomogram: Anti Xa < 0.1 (max 4000 units)    Route: Intravenous    heparin (porcine) injection 3,710 Units 60 Units/kg × 61.8 kg Once 6/19/2025 --    Admin Instructions: Initial Bolus: 60 units/kg (Max 4,000 units)    Route: Intravenous    heparin 91907 units/250 mL (100 units/mL) in 0.45 % NaCl infusion 12 Units/kg/hr × 61.8 kg Titrated 6/19/2025 --    Admin Instructions: " "Weight based heparin nomogram for Low Dose Protocol for Cardiac or Other (Not VTE)    Max initial infusion rate 1000 units/hr.    Anti Xa < 0.1 : Rebolus with 50 Units/kg(4000 Units Max). Increase infusion dose by 3 Units/kg/hr. Next Anti Xa in 6 hours.  Anti Xa 0.1-0.19 : Rebolus with 25 Units/kg(2000 Units Max). Increase infusion dose by 2 Units/kg/hr. Next Anti Xa in 6 hours.  Anti Xa 0.2-0.29 : Increase infusion dose by 1 Units/kg/hr. Next Anti Xa in 6 hours.   Anti Xa 0.3-0.5 : No change. Next Anti Xa in 6 hours (after 2 consecutive levels in range check  qAM).  Anti Xa 0.51-0.6 : Decrease infusion dose by 1 Units/kg/hr. Next Anti Xa in 6 hours.  Anti Xa 0.61-0.8 : Hold infusion 30 minutes. Decrease infusion dose by 2 Units/kg/hr. Next Anti Xa in 6 hours.  Anti Xa 0.81-1 : Hold infusion 60 minutes. Decrease infusion dose by 3 Units/kg/hr. Next Anti Xa in 6 hours.  Anti Xa > 1 : Hold infusion. Anti Xa every 2 hours until Anti Xa is less than 0.5. Once Anti Xa less than 0.5, Decrease previous rate by 4 Units/kg/hr and when infusion restarts, next Anti Xa in 6 hours.    Route: Intravenous    hydrALAZINE (APRESOLINE) tablet 100 mg 100 mg 3 Times Daily 6/19/2025 --    Admin Instructions: Hold for SBP less than 100, DBP less than 60.  Caution: Look alike/sound alike drug alert    Route: Oral    HYDROmorphone (DILAUDID) injection 0.5 mg 0.5 mg Every 2 Hours PRN 6/19/2025 6/24/2025    Admin Instructions: Based on patient request - if ordered for moderate or severe pain, provider allows for administration of a medication prescribed for a lower pain scale.  If given for pain, use the following pain scale:  Mild Pain = Pain Score of 1-3, CPOT 1-2  Moderate Pain = Pain Score of 4-6, CPOT 3-4  Severe Pain = Pain Score of 7-10, CPOT 5-8    Route: Intravenous    Linked Group 3: Placed in \"And\" Linked Group        isosorbide dinitrate (ISORDIL) tablet 10 mg 10 mg 3 Times Daily (Nitrates) 6/19/2025 --    Route: Oral    " "levothyroxine (SYNTHROID, LEVOTHROID) tablet 100 mcg 100 mcg Every Early Morning 6/20/2025 --    Admin Instructions: Take on empty stomach.    Route: Oral    montelukast (SINGULAIR) tablet 10 mg 10 mg Nightly 6/19/2025 --    Route: Oral    naloxone (NARCAN) injection 0.4 mg 0.4 mg Every 5 Minutes PRN 6/19/2025 --    Admin Instructions: If respiratory rate is less than 8 breaths/minute or patient is difficult to arouse stop any narcotics and contact physician.   Administer slow IV push. Repeat as ordered until patient's respiratory rate is greater than 12 breaths/minute.    Route: Intravenous    Linked Group 3: Placed in \"And\" Linked Group        NIFEdipine XL (PROCARDIA XL) 24 hr tablet 120 mg 120 mg Daily 6/19/2025 --    Admin Instructions: Caution: Look alike/sound alike drug alert. Avoid grapefruit juice. Swallow whole. Do not crush, split or chew.    Route: Oral    nitroglycerin (NITROSTAT) SL tablet 0.4 mg 0.4 mg Every 5 Minutes PRN 6/19/2025 --    Admin Instructions: If Pain Unrelieved After 3 Doses Notify MD  May administer up to 3 doses per episode. Hold if SBP less than 100.    Route: Sublingual    oxyCODONE-acetaminophen (PERCOCET) 5-325 MG per tablet 1 tablet 1 tablet Every 6 Hours PRN 6/19/2025 --    Admin Instructions: Based on patient request - if ordered for moderate or severe pain, provider allows for administration of a medication prescribed for a lower pain scale.  [COURTNEY]    Do not exceed 4 grams of acetaminophen in a 24 hr period. Max dose of 2gm for AST/ALT greater than 120 units/L        If given for pain, use the following pain scale:   Mild Pain = Pain Score of 1-3, CPOT 1-2  Moderate Pain = Pain Score of 4-6, CPOT 3-4  Severe Pain = Pain Score of 7-10, CPOT 5-8    Route: Oral    polyethylene glycol (MIRALAX) packet 17 g 17 g Daily PRN 6/19/2025 --    Admin Instructions: Use if no bowel movement after 12 hours. Mix in 6-8 ounces of water.  Use 4-8 ounces of water, tea, or juice for each 17 gram " "dose.    Route: Oral    Linked Group 2: Placed in \"And\" Linked Group        sennosides-docusate (PERICOLACE) 8.6-50 MG per tablet 2 tablet 2 tablet 2 Times Daily 6/19/2025 --    Admin Instructions: HOLD MEDICATION IF PATIENT HAS HAD BOWEL MOVEMENT. Start bowel management regimen if patient has not had a bowel movement after 12 hours.    Route: Oral    Linked Group 2: Placed in \"And\" Linked Group        sodium chloride 0.9 % flush 10 mL 10 mL As Needed 6/19/2025 --    Route: Intravenous    Cosign for Ordering: Required by Laurent Cottrell MD    Linked Group 4: Placed in \"And\" Linked Group        sodium chloride 0.9 % flush 10 mL 10 mL Every 12 Hours Scheduled 6/19/2025 --    Route: Intravenous    sodium chloride 0.9 % flush 10 mL 10 mL As Needed 6/19/2025 --    Route: Intravenous    sodium chloride 0.9 % infusion 40 mL 40 mL As Needed 6/19/2025 --    Admin Instructions: Following administration of an IV intermittent medication, flush line with 40mL NS at 100mL/hr.    Route: Intravenous    spironolactone (ALDACTONE) tablet 25 mg 25 mg Daily 6/19/2025 --    Admin Instructions: Hold for SBP less than 100, DBP less than 60.  Group 1 (Yellow) Hazardous Drug - See Handling Guide    Route: Oral    tamsulosin (FLOMAX) 24 hr capsule 0.4 mg 0.4 mg Nightly 6/19/2025 --    Admin Instructions: Do not crush or chew the capsules or tablets. The drug may not work as designed if the capsule or tablet is crushed or chewed. Swallow whole. If patient unable to swallow whole, contact pharmacy for alternative.    Route: Oral    valsartan (DIOVAN) tablet 320 mg 320 mg Daily 6/19/2025 --    Admin Instructions: Hold for SBP less than 100, DBP less than 60, or heart rate less than 50. If a dose is held, please contact the provider.    Route: Oral            Review of Systems   Constitutional: Negative.  Negative for diaphoresis and fever.   HENT: Negative.     Eyes: Negative.    Respiratory: Negative.  Negative for shortness of breath and " "wheezing.    Cardiovascular: Negative.  Negative for chest pain and leg swelling.   Gastrointestinal: Negative.  Negative for abdominal pain.   Endocrine: Negative.    Genitourinary: Negative.    Musculoskeletal: Negative.    Skin: Negative.    Allergic/Immunologic: Negative.    Neurological: Negative.  Negative for dizziness and weakness.   Hematological: Negative.    Psychiatric/Behavioral: Negative.         /56 (BP Location: Right arm, Patient Position: Lying)   Pulse 71   Temp 98.1 °F (36.7 °C) (Oral)   Resp 16   Ht 182.9 cm (72\")   Wt 62.5 kg (137 lb 11.2 oz)   SpO2 94%   BMI 18.68 kg/m²    Physical Exam  Vitals and nursing note reviewed.   Constitutional:       General: He is not in acute distress.     Appearance: Normal appearance. He is not diaphoretic.   HENT:      Head: Normocephalic. No right periorbital erythema or left periorbital erythema.      Nose: Nose normal.   Eyes:      General: No scleral icterus.     Pupils: Pupils are equal.   Cardiovascular:      Rate and Rhythm: Normal rate and regular rhythm.      Pulses:           Femoral pulses are 2+ on the right side and 2+ on the left side.     Comments: Poor right pedal signals.  Pulmonary:      Effort: Pulmonary effort is normal. No respiratory distress.   Abdominal:      General: There is no distension.      Palpations: Abdomen is soft.      Tenderness: There is no abdominal tenderness. There is no guarding.   Musculoskeletal:         General: No swelling or tenderness. Normal range of motion.      Cervical back: Normal range of motion and neck supple.      Right lower leg: No edema.      Left lower leg: No edema.   Feet:      Right foot:      Skin integrity: Skin integrity normal.      Left foot:      Skin integrity: Skin integrity normal.   Skin:     General: Skin is warm and dry.      Findings: No erythema or rash.      Comments: Right foot with slightly diminished cap refill.  No evidence wounds.   Neurological:      General: No " focal deficit present.      Mental Status: He is alert and oriented to person, place, and time. Mental status is at baseline.      Cranial Nerves: No cranial nerve deficit.      Gait: Gait normal.   Psychiatric:         Attention and Perception: Attention normal.         Mood and Affect: Mood normal.         Behavior: Behavior normal.         Thought Content: Thought content normal.         Judgment: Judgment normal.         Laboratory Data:  Results from last 7 days   Lab Units 06/19/25  1506   WBC 10*3/mm3 5.65   HEMOGLOBIN g/dL 8.1*   HEMATOCRIT % 25.3*   PLATELETS 10*3/mm3 109*       Results from last 7 days   Lab Units 06/19/25  1506   SODIUM mmol/L 135*   POTASSIUM mmol/L 4.0   CHLORIDE mmol/L 98   CO2 mmol/L 27.0   BUN mg/dL 32.8*   CREATININE mg/dL 2.66*   CALCIUM mg/dL 8.6   BILIRUBIN mg/dL 0.3   ALK PHOS U/L 180*   ALT (SGPT) U/L 10   AST (SGOT) U/L 17   GLUCOSE mg/dL 107*     Results from last 7 days   Lab Units 06/19/25  1506   PROTIME Seconds 15.7*   INR  1.19*   APTT seconds 33.3         Diagnostic Data:  Imaging Results (Last 24 Hours)       Procedure Component Value Units Date/Time    US Arterial Doppler Lower Extremity Right [[id number]] Collected: 06/19/25 1819     Updated: 06/19/25 1824    Narrative:      History: PAD     Comments: Grayscale imaging as well as color flow duplex were used to  evaluate the right lower extremity arterial system     On the right, the peak systolic velocity in the common femoral artery  measured 69 cm/s. In the profunda femoris artery measured 47.6 cm/s. In  the proximal SFA measured 69 cm/s. The mid SFA measured 56.3 cm/s. In  the distal SFA measured 57.9 cm/s. In the popliteal artery measured 31.2  cm/s. In the posterior tibial artery measured 34.9 cm/s. In the anterior  tibial artery measured 22 cm/s.          Impression:      No hemodynamically significant stenosis identified in the  right lower extremity. There is near continuous flow in the tibial  vessels which  indicates a significant stenosis versus occlusion of the  more proximal arteries. There is monophasic flow in the right common  femoral artery which may indicate more proximal stenosis.     This report was signed and finalized on 6/19/2025 6:21 PM by Blayne Zabala.       XR Chest 1 View [[id number]] Collected: 06/19/25 1546     Updated: 06/19/25 1552    Narrative:      EXAM: XR CHEST 1 VW-         HISTORY: work up       COMPARISON: 5/29/2025.     TECHNIQUE:  Frontal and lateral views of the chest submitted.     FINDINGS:    There is increasing opacity over the right hemithorax may represent  increasing layering right pleural effusion. Underlying pneumonia not  excluded. There is probable volume overload. A right IJ tunneled central  venous dialysis catheter tip is at the SVC. The patient is status post  median sternotomy and CABG. There is calcification of the thoracic  aorta. No left effusion is seen and no pneumothorax identified. There is  sclerosis at the right humeral head may represent avascular necrosis.          Impression:      1. Increasing diffuse opacity of the right hemithorax may represent  increasing layering right pleural effusion and probable volume overload.     This report was signed and finalized on 6/19/2025 3:48 PM by Eleuterio Rivera.               Impression: 75-year-old male with significant PAD who presents with right lower extremity rest pain.    Vascular occlusion      Plan: After thoroughly evaluating Ricardo Hugo, I believe the best course of action is to plan on right lower extremity arteriogram. Risks of angiogram were discussed.  These include, but are not limited to, bleeding, infection, vessel damage, nerve damage, embolus, and loss of limb.  The patient understands these risks and wishes to proceed with procedure.  Hospitalist to admit appreciate their assistance.  Continue heparin drip.  N.p.o. after midnight.    Thank you for allowing me to participate in the care of your  patient.  Please do not hesitate to call with any questions or concerns.  Blayne Zabala MD   Vascular Surgery  [phone]

## 2025-06-20 NOTE — PLAN OF CARE
Goal Outcome Evaluation:           Progress: improving    Patient underwent dialysis this AM - tolerated well. Currently in OR for vascular surgery.

## 2025-06-20 NOTE — PROGRESS NOTES
"    [facility] Medicine Services  INPATIENT PROGRESS NOTE    Patient Name: Ricardo Hugo  Date of Admission: 6/19/2025  Today's Date: 06/20/25  Length of Stay: 1  Primary Care Physician: Nathan Zimmer MD    Subjective   Chief Complaint: f/u PAD/occlusion    HPI   Patient seen resting comfortably in bed on dialysis.  Denies chest pain or dizziness.  No nausea.  Afebrile overnight.      Review of Systems   All pertinent negatives and positives are as above. All other systems have been reviewed and are negative unless otherwise stated.     Objective    Temp:  [97.6 °F (36.4 °C)-98.2 °F (36.8 °C)] 97.9 °F (36.6 °C)  Heart Rate:  [55-71] 58  Resp:  [14-16] 16  BP: (113-169)/(41-63) 133/46  Physical Exam  GEN: Awake, alert, interactive, in NAD, appears thin/frail  HEENT: PERRLA, EOMI, Anicteric, Trachea midline  Lungs: no wheezing/rales/rhonchi  Heart: RRR, +S1/s2, no rub  ABD: soft, nt/nd, +BS, no guarding/rebound  Extremities: slight R foot discoloration w/o obvious palpable pedal pulse. Some skin flaking to foot.   Neuro: AAOx3, no focal deficits  Psych: normal mood & affect        Results Review:  I have reviewed the labs, radiology results, and diagnostic studies.    Laboratory Data:   Results from last 7 days   Lab Units 06/20/25  0307 06/19/25  1506   WBC 10*3/mm3 5.40 5.65   HEMOGLOBIN g/dL 7.9* 8.1*   HEMATOCRIT % 24.8* 25.3*   PLATELETS 10*3/mm3 111* 109*        Results from last 7 days   Lab Units 06/20/25  0307 06/19/25  1506   SODIUM mmol/L 137 135*   POTASSIUM mmol/L 4.0 4.0   CHLORIDE mmol/L 101 98   CO2 mmol/L 24.0 27.0   BUN mg/dL 38.9* 32.8*   CREATININE mg/dL 3.03* 2.66*   CALCIUM mg/dL 8.7 8.6   BILIRUBIN mg/dL  --  0.3   ALK PHOS U/L  --  180*   ALT (SGPT) U/L  --  10   AST (SGOT) U/L  --  17   GLUCOSE mg/dL 102* 107*       Culture Data:   No results found for: \"BLOODCX\", \"URINECX\", \"WOUNDCX\", \"MRSACX\", \"RESPCX\", \"STOOLCX\"    Radiology Data:   Imaging Results " (Last 24 Hours)       Procedure Component Value Units Date/Time    US Arterial Doppler Lower Extremity Right [[id number]] Collected: 06/19/25 1819     Updated: 06/19/25 1824    Narrative:      History: PAD     Comments: Grayscale imaging as well as color flow duplex were used to  evaluate the right lower extremity arterial system     On the right, the peak systolic velocity in the common femoral artery  measured 69 cm/s. In the profunda femoris artery measured 47.6 cm/s. In  the proximal SFA measured 69 cm/s. The mid SFA measured 56.3 cm/s. In  the distal SFA measured 57.9 cm/s. In the popliteal artery measured 31.2  cm/s. In the posterior tibial artery measured 34.9 cm/s. In the anterior  tibial artery measured 22 cm/s.          Impression:      No hemodynamically significant stenosis identified in the  right lower extremity. There is near continuous flow in the tibial  vessels which indicates a significant stenosis versus occlusion of the  more proximal arteries. There is monophasic flow in the right common  femoral artery which may indicate more proximal stenosis.     This report was signed and finalized on 6/19/2025 6:21 PM by Blayne Zabala.       XR Chest 1 View [[id number]] Collected: 06/19/25 1546     Updated: 06/19/25 1552    Narrative:      EXAM: XR CHEST 1 VW-         HISTORY: work up       COMPARISON: 5/29/2025.     TECHNIQUE:  Frontal and lateral views of the chest submitted.     FINDINGS:    There is increasing opacity over the right hemithorax may represent  increasing layering right pleural effusion. Underlying pneumonia not  excluded. There is probable volume overload. A right IJ tunneled central  venous dialysis catheter tip is at the SVC. The patient is status post  median sternotomy and CABG. There is calcification of the thoracic  aorta. No left effusion is seen and no pneumothorax identified. There is  sclerosis at the right humeral head may represent avascular necrosis.          Impression:       1. Increasing diffuse opacity of the right hemithorax may represent  increasing layering right pleural effusion and probable volume overload.     This report was signed and finalized on 6/19/2025 3:48 PM by Eleuterio Rivera.               I have reviewed the patient's current medications.     Assessment/Plan   Assessment  Active Hospital Problems    Diagnosis     **Vascular occlusion     ESRD (end stage renal disease) on dialysis     Chronic diastolic (congestive) heart failure     CLL (chronic lymphocytic leukemia)     Peripheral arterial disease     Essential hypertension        Treatment Plan  #1 vascular occlusion -on heparin drip with vascular consult and plans to go for angiogram and revas today.  Follow-up.  Continue symptom control.    #2 ESRD -nephrology consulted.  Getting dialysis today.    #3 chronic diastolic CHF -volume status seems fair to actually slightly dry.  Getting dialysis today.  No exacerbation.    #4 hypertension -Home BP meds resumed.  Monitor.  He is currently on nifedipine, Isordil, hydralazine, clonidine, Coreg, Aldactone.  Fairly extensive regimen.    Medical Decision Making  Number and Complexity of problems: 1 acute, multiple chronic  Differential Diagnosis: As above    Conditions and Status        New to me.  Plans for OR today.  Not yet at goal.     MDM Data  External documents reviewed: None  Cardiac tracing (EKG, telemetry) interpretation: None  Radiology interpretation: Reports reviewed  Labs reviewed: As above  Any tests that were considered but not ordered: None     Decision rules/scores evaluated (example [id number], [place], etc): None     Discussed with: Patient, nursing     Care Planning  Shared decision making: Patient apprised of current labs, vitals, imaging and treatment plan.  They are agreeable with proceeding with plans as discussed.    Code status and discussions: full code    Disposition  Social Determinants of Health that impact treatment or disposition:  none  I expect the patient to be discharged to [facility] pending OR/vascular recs and clinical course. Possible 1-2 days.         Electronically signed by Dc Issa DO, 06/20/25, 12:24 CDT.

## 2025-06-20 NOTE — CASE MANAGEMENT/SOCIAL WORK
Discharge Planning Assessment  [facility]     Patient Name: Ricardo Hugo  MRN: [id number]  Today's Date: 6/20/2025    Admit Date: 6/19/2025        Discharge Needs Assessment       Row Name 06/20/25 0911       Living Environment    People in Home facility resident    Current Living Arrangements extended care facility    Potentially Unsafe Housing Conditions none    In the past 12 months has the electric, gas, oil, or water company threatened to shut off services in your home? No    Primary Care Provided by other (see comments)    Provides Primary Care For no one       Resource/Environmental Concerns    Resource/Environmental Concerns none    Transportation Concerns none       Transportation Needs    In the past 12 months, has lack of transportation kept you from medical appointments or from getting medications? no    In the past 12 months, has lack of transportation kept you from meetings, work, or from getting things needed for daily living? No       Food Insecurity    Within the past 12 months, you worried that your food would run out before you got the money to buy more. Never true    Within the past 12 months, the food you bought just didn't last and you didn't have money to get more. Never true       Discharge Needs Assessment    Equipment Currently Used at Home walker, standard    Concerns to be Addressed discharge planning    Do you want help finding or keeping work or a job? I do not need or want help    Do you want help with school or training? For example, starting or completing job training or getting a high school diploma, GED or equivalent No    Anticipated Changes Related to Illness none    Equipment Needed After Discharge none    Discharge Coordination/Progress Patient was discharged from [facility] on 6/5/25 to [facility] facility.  I called this morning and they state he DOES NOT have a bed hold.  SW made aware.                   Discharge Plan    No documentation.                  Selected Continued Care - Episodes Includes continued care and service providers with selected services from the active episodes listed below          Selected Continued Care - Prior Encounters Includes continued care and service providers with selected services from prior encounters from 3/21/2025 to 6/20/2025      Discharged on 6/5/2025 Admission date: 5/21/2025 - Discharge disposition: Skilled Nursing Facility (DC - External)      Destination       Service Provider Services Address Phone Fax Patient Preferred    [facility] [address] [place] [id number] [phone] --                             Demographic Summary    No documentation.                  Functional Status    No documentation.                  Psychosocial    No documentation.                  Abuse/Neglect    No documentation.                  Legal    No documentation.                  Substance Abuse    No documentation.                  Patient Forms    No documentation.                     Imelda Beck RN

## 2025-06-20 NOTE — CASE MANAGEMENT/SOCIAL WORK
Continued Stay Note   [facility]     Patient Name: Ricardo Hugo  MRN: [id number]  Today's Date: 6/20/2025    Admit Date: 6/19/2025    Plan: [facility]   Discharge Plan       Row Name 06/20/25 1106       Plan    Plan [facility]    Patient/Family in Agreement with Plan yes    Plan Comments Spoke with Charito at [facility] and they will take pt back as long as a bed is available. Pharmacy ([facility] in [place]) is correct in Epic.                   Discharge Codes    No documentation.                       LINNETTE Ramos

## 2025-06-20 NOTE — CONSULTS
Nephrology ([facility]) Consult Note      Patient:  Ricadro Hguo  YOB: 1950  Date of Service: 6/20/2025  MRN: [id number]   Acct: [id number]   Primary Care Physician: Nathan Zimmer MD  Advance Directive:   Code Status and Medical Interventions: CPR (Attempt to Resuscitate); Full Support   Ordered at: 06/19/25 1944     Code Status (Patient has no pulse and is not breathing):    CPR (Attempt to Resuscitate)     Medical Interventions (Patient has pulse or is breathing):    Full Support     Admit Date: 6/19/2025       Hospital Day: 1  Referring Provider: No Known Provider      Patient Seen, Chart, Consults, Notes, Labs, Radiology studies reviewed.    Chief complaint: Right leg pain.    Subjective:  Ricardo Hugo is a 75 y.o. male  whom we were consulted for end-stage renal disease on maintenance dialysis.  Patient has permacatheter as a dialysis access.  He also has history of peripheral vascular disease, right carotid endarterectomy, PTA of the right external iliac artery, right SFA.  Presenting to the emergency room complaining of right leg pain.  His pain has progressively getting worse and has decided come to the emergency room.  Patient is currently being evaluated by Dr. Zabala for another arteriogram, look at the status of his blood flow to the right leg.    This morning patient underwent dialysis treatment without any complication.  Meanwhile he is receiving intravenous heparin drip to improve circulation at his lower extremity.    Allergies:  Ondansetron, Zofran [ondansetron hcl], Lortab [hydrocodone-acetaminophen], and Allopurinol    Home Meds:  Medications Prior to Admission   Medication Sig Dispense Refill Last Dose/Taking    albuterol sulfate  (90 Base) MCG/ACT inhaler Inhale 2 puffs Every 6 (Six) Hours As Needed for Wheezing or Shortness of Air.       ALPRAZolam (XANAX) 0.25 MG tablet Take 1 tablet by mouth At Night As Needed for Anxiety or Sleep.  24 tablet 0     aspirin (aspirin) 81 MG EC tablet Take 1 tablet by mouth Daily.       atorvastatin (LIPITOR) 10 MG tablet Take 1 tablet by mouth Daily. 90 tablet 3     Budeson-Glycopyrrol-Formoterol (Breztri Aerosphere) 160-9-4.8 MCG/ACT aerosol inhaler Inhale 2 puffs 2 (Two) Times a Day.       calcitriol (ROCALTROL) 0.5 MCG capsule Take 1 capsule by mouth Daily. 90 capsule 2     carvedilol (COREG) 12.5 MG tablet Take 1 tablet by mouth 2 (Two) Times a Day With Meals.       cloNIDine (CATAPRES) 0.3 MG tablet Take 1 tablet by mouth 3 times a day.       clopidogrel (PLAVIX) 75 MG tablet Take 1 tablet by mouth Daily.       desoximetasone (TOPICORT) 0.25 % cream Apply 1 Application topically to the appropriate area as directed 2 (Two) Times a Day As Needed for Irritation. irritation 60 g 0     docusate sodium (COLACE) 100 MG capsule Take 1 capsule by mouth 3 (Three) Times a Day As Needed for Constipation.       empagliflozin (Jardiance) 10 MG tablet tablet Take 1 tablet by mouth Daily.       fluticasone (FLONASE) 50 MCG/ACT nasal spray Administer 2 sprays into the nostril(s) as directed by provider Daily As Needed for Allergies.       gabapentin (NEURONTIN) 300 MG capsule Take 1 capsule by mouth Every Night. 24 capsule 0     hydrALAZINE (APRESOLINE) 100 MG tablet Take 1 tablet by mouth 3 (Three) Times a Day.       iron polysaccharides (NIFEREX) 150 MG capsule Take 1 capsule by mouth Daily.       isosorbide dinitrate (ISORDIL) 10 MG tablet Take 1 tablet by mouth 3 (Three) Times a Day. 270 tablet 2     levothyroxine (Synthroid) 100 MCG tablet Take 1 tablet by mouth Every Morning. 90 tablet 2     magnesium chloride ER 64 MG DR tablet Take 1 tablet by mouth Daily.       montelukast (SINGULAIR) 10 MG tablet TAKE 1 TABLET EVERY NIGHT 90 tablet 3     Multiple Vitamins-Minerals (PRESERVISION/LUTEIN) capsule Take 1 capsule by mouth 2 (two) times a day.       naloxone (NARCAN) 4 MG/0.1ML nasal spray Call 911. Don't prime. Pavo in  1 nostril for overdose. Repeat in 2-3 minutes in other nostril if no or minimal breathing/responsiveness. 2 each 0     NIFEdipine XL (ADALAT CC) 60 MG 24 hr tablet Take 2 tablets by mouth Daily.       omeprazole (priLOSEC) 20 MG capsule Take 1 capsule by mouth Daily.       oxyCODONE-acetaminophen (PERCOCET) 5-325 MG per tablet Take 1 tablet by mouth Every 6 (Six) Hours As Needed for Moderate Pain. 24 tablet 0     spironolactone (ALDACTONE) 25 MG tablet Take 1 tablet by mouth Daily.       tamsulosin (FLOMAX) 0.4 MG capsule 24 hr capsule Take 1 capsule by mouth Every Night.       valsartan (DIOVAN) 320 MG tablet Take 1 tablet by mouth Daily.       vitamin D (ERGOCALCIFEROL) 1.25 MG (13513 UT) capsule capsule Take 1 capsule by mouth 1 (One) Time Per Week. Mondays          Medicines:  Current Facility-Administered Medications   Medication Dose Route Frequency Provider Last Rate Last Admin    acetaminophen (TYLENOL) tablet 650 mg  650 mg Oral Q4H PRN Donal Rodriguez MD        Or    acetaminophen (TYLENOL) 160 MG/5ML oral solution 650 mg  650 mg Oral Q4H PRN Donal Rodriguez MD        Or    acetaminophen (TYLENOL) suppository 650 mg  650 mg Rectal Q4H PRN Donal Rodriguez MD        ALPRAZolam (XANAX) tablet 0.25 mg  0.25 mg Oral Nightly PRN Donal Rodriguez MD        aspirin EC tablet 81 mg  81 mg Oral Daily Donal Rodriguez MD   81 mg at 06/19/25 2152    atorvastatin (LIPITOR) tablet 10 mg  10 mg Oral Nightly Donal Rodriguez MD   10 mg at 06/19/25 2152    sennosides-docusate (PERICOLACE) 8.6-50 MG per tablet 2 tablet  2 tablet Oral BID Donal Rodriguez MD   2 tablet at 06/19/25 2153    And    polyethylene glycol (MIRALAX) packet 17 g  17 g Oral Daily PRN Donal Rodriguez MD        And    bisacodyl (DULCOLAX) EC tablet 5 mg  5 mg Oral Daily PRN Donal Rodriguez MD        And    bisacodyl (DULCOLAX) suppository 10 mg  10 mg Rectal Daily PRN Michael  Donal Whitmore MD        bupivacaine (PF) (MARCAINE) 0.5 % injection    PRN Blayne Zabala MD   30 mL at 06/20/25 1540    carvedilol (COREG) tablet 12.5 mg  12.5 mg Oral BID With Meals Donal Rodriguez MD   12.5 mg at 06/19/25 2153    ceFAZolin 2000 mg IVPB in 100 mL NS (MBP)  2,000 mg Intravenous Once Blayne Zabala MD        cloNIDine (CATAPRES) tablet 0.3 mg  0.3 mg Oral TID Donal Rodriguez MD   0.3 mg at 06/19/25 2153    clopidogrel (PLAVIX) tablet 75 mg  75 mg Oral Daily Donal Rodriguez MD        gabapentin (NEURONTIN) capsule 300 mg  300 mg Oral Nightly Donal Rodriguez MD   300 mg at 06/19/25 2152    heparin (porcine) injection 1,550 Units  25 Units/kg Intravenous PRN Donal Rodriguez MD        heparin (porcine) injection 3,090 Units  50 Units/kg Intravenous PRN Donal Rodriguez MD   3,090 Units at 06/20/25 0429    heparin 74654 units/250 mL (100 units/mL) in 0.45 % NaCl infusion  12 Units/kg/hr Intravenous Titrated Donal Rodriguez MD 7.39 mL/hr at 06/20/25 1515 11.97 Units/kg/hr at 06/20/25 1515    hydrALAZINE (APRESOLINE) tablet 100 mg  100 mg Oral TID Donal Rodriguez MD   100 mg at 06/19/25 2153    HYDROmorphone (DILAUDID) injection 0.5 mg  0.5 mg Intravenous Q2H PRN Donal Rodriguez MD   0.5 mg at 06/20/25 1348    And    naloxone (NARCAN) injection 0.4 mg  0.4 mg Intravenous Q5 Min PRN Donal Rodriguez MD        iopamidol (ISOVUE-300) 61 % injection    PRN Blayne Zabala MD   100 mL at 06/20/25 1540    isosorbide dinitrate (ISORDIL) tablet 10 mg  10 mg Oral TID - Nitrates Donal Rodriguez MD   10 mg at 06/19/25 2153    levothyroxine (SYNTHROID, LEVOTHROID) tablet 100 mcg  100 mcg Oral Q AM Donal Rodriguez MD        montelukast (SINGULAIR) tablet 10 mg  10 mg Oral Nightly Donal Rodriguez MD   10 mg at 06/19/25 2152    NIFEdipine XL (PROCARDIA XL) 24 hr tablet 120 mg  120 mg Oral Daily  Donal Rodriguez MD   120 mg at 06/19/25 2152    nitroglycerin (NITROSTAT) SL tablet 0.4 mg  0.4 mg Sublingual Q5 Min PRN Donal Rodriguez MD        oxyCODONE-acetaminophen (PERCOCET) 5-325 MG per tablet 1 tablet  1 tablet Oral Q6H PRN Donal Rodriguez MD   1 tablet at 06/20/25 0836    sodium chloride 0.9 % flush 10 mL  10 mL Intravenous Q12H Donal Rodriguez MD   10 mL at 06/20/25 0844    sodium chloride 0.9 % flush 10 mL  10 mL Intravenous PRN Donal Rodriguez MD        sodium chloride 0.9 % flush 3 mL  3 mL Intravenous Q12H Kristin Newell MD        sodium chloride 0.9 % flush 3-10 mL  3-10 mL Intravenous PRN Kristin Newell MD        sodium chloride 0.9 % infusion 40 mL  40 mL Intravenous PRN Donal Rodriguez MD        sodium chloride 0.9 % infusion 40 mL  40 mL Intravenous PRN Kristin Newell MD        sodium chloride 0.9 % infusion  50 mL/hr Intravenous Continuous Kristin Newell MD 50 mL/hr at 06/20/25 1515 Currently Infusing at 06/20/25 1515    sodium chloride 500 mL with heparin (porcine) 5,000 Units mixture    PRN Blayne Zabala MD   Given at 06/20/25 1534    spironolactone (ALDACTONE) tablet 25 mg  25 mg Oral Daily Donal Rodriguez MD   25 mg at 06/19/25 2152    tamsulosin (FLOMAX) 24 hr capsule 0.4 mg  0.4 mg Oral Nightly Donal Rodriguez MD   0.4 mg at 06/19/25 2152    valsartan (DIOVAN) tablet 320 mg  320 mg Oral Daily Donal Rodriguez MD   320 mg at 06/19/25 2152     Facility-Administered Medications Ordered in Other Encounters   Medication Dose Route Frequency Provider Last Rate Last Admin    ceFAZolin (ANCEF) injection   Intravenous PRN Madeleine Selby CRNA   2 g at 06/20/25 1529    heparin (porcine) injection   Intravenous PRN Madeleine Selby CRNA   6,000 Units at 06/20/25 1539    lidocaine PF 2% (XYLOCAINE) injection   Intravenous PRN Madeleine Selby CRNA   60 mg at 06/20/25  1521    propofol (DIPRIVAN) infusion 10 mg/mL 100 mL   Intravenous Continuous PRN Madeleine Selby, CRNA 27.225 mL/hr at 06/20/25 1522 75 mcg/kg/min at 06/20/25 1522    Propofol (DIPRIVAN) injection   Intravenous PRN SelbyLacyMadeleine A CRNA   20 mg at 06/20/25 1521       Past Medical History:  Past Medical History:   Diagnosis Date    3-vessel CAD 08/11/2020    Allergic rhinitis     Anemia     Anxiety disorder 04/27/2020    Arthritis     Asymmetrical sensorineural hearing loss 06/28/2017    Atherosclerosis of native artery of both lower extremities with intermittent claudication 07/18/2019    Avascular necrosis of femoral head, left 07/11/2020    right hip after surgery    Carotid stenosis     Chronic mucoid otitis media     Chronic rhinitis     COPD (chronic obstructive pulmonary disease)     Coronary artery disease     HEART BYPASS 2004    Crohn's disease of large intestine with other complication 07/30/2020    Chronic diarrhea Colonoscopy July 2020 revealed mild patchy scattered hemosiderin staining with inflammation more so in rectosigmoid area.  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    Iron deficiency anemia 07/14/2020    Mixed hearing loss of left ear     PAD (peripheral artery disease) 08/11/2020    Perianal abscess     Pernicious anemia 08/17/2020    took shots but never diagnosed with b12 deficiency    Personal history of alcoholism 08/11/2020    quit drinking in 2013    Prostatic hypertrophy 08/11/2020    Sensorineural hearing loss     Sepsis with acute renal failure 09/15/2020    Shortness of breath 05/27/2021    Tinnitus     Ventricular  tachycardia, nonsustained 07/14/2020    Weight loss 07/11/2020       Past Surgical History:  Past Surgical History:   Procedure Laterality Date    AORTOGRAM Right 4/25/2025    Procedure: RIGHT LOWER EXTREMITY ANGIOGRAM, SHOCKWAVE LITHOTRIPSY, BALLOON ANGIOPLASTY, MYNX CLOSURE;  Surgeon: Gil Pineda DO;  Location: [facility] HYBRID OR;  Service: Vascular;  Laterality: Right;    AORTOGRAM Left 5/22/2025    Procedure: LEFT LOWER EXTREMITY ANGIOGRAM, SHOCKWAVE LITHOTRIPSY, BALLOON ANGIOPLASTY, STENT PLACEMENT, MYNX CLOSURE;  Surgeon: Blayne Zabala MD;  Location: [facility] HYBRID OR;  Service: Vascular;  Laterality: Left;    AORTOGRAM Right 5/30/2025    Procedure: AORTOILIAC ANGIOGRAM WITH LEFT LOWER EXTREMITY ANGIOGRAM;  Surgeon: Gil Pineda DO;  Location: [facility] HYBRID OR;  Service: Vascular;  Laterality: Right;    ARTERY SURGERY  2021    right side on neck    CAROTID ENDARTERECTOMY Right 05/10/2021    Procedure: RIGHT CAROTID ENDARTERECTOMY WITH EEG;  Surgeon: Gil Pineda DO;  Location: [facility] HYBRID OR 12;  Service: Vascular;  Laterality: Right;    COLONOSCOPY N/A 07/02/2020    Procedure: COLONOSCOPY WITH ANESTHESIA;  Surgeon: Adrien Brewster MD;  Location: [facility] ENDOSCOPY;  Service: Gastroenterology;  Laterality: N/A;  pre op: diarrhea  post op: polyps  PCP: Joe Velasco MD    COLONOSCOPY N/A 10/13/2020    Procedure: COLONOSCOPY WITH ANESTHESIA;  Surgeon: Adrien Brewster MD;  Location: [facility] ENDOSCOPY;  Service: Gastroenterology;  Laterality: N/A;  Pre: Chronic Diarrhea, Crohn's  Post: AVM  Dr. Neftali Velasco  CO2 Inflation Used    COLONOSCOPY N/A 12/08/2023    Procedure: COLONOSCOPY WITH ANESTHESIA;  Surgeon: Adrien Brewster MD;  Location: [facility] ENDOSCOPY;  Service: Gastroenterology;  Laterality: N/A;  pre chrone's disease  post sub optimal prep, polyp, chrone's      CORONARY ARTERY BYPASS GRAFT  2003    x3    ENDOSCOPY N/A 11/02/2021    Procedure:  ESOPHAGOGASTRODUODENOSCOPY WITH ANESTHESIA;  Surgeon: Bridger Bell MD;  Location: [facility] ENDOSCOPY;  Service: Gastroenterology;  Laterality: N/A;  pre anemia;gi bleed  post  gi bleed;fabian Velasco    ENDOSCOPY N/A 10/10/2023    Procedure: ESOPHAGOGASTRODUODENOSCOPY WITH ANESTHESIA;  Surgeon: Adrien Brewster MD;  Location: [facility] ENDOSCOPY;  Service: Gastroenterology;  Laterality: N/A;  preop; anemia  postop esophagitis ; R/O barretts   PCP Randall Beata    EYE SURGERY Bilateral     catorac    INCISION AND DRAINAGE PERIRECTAL ABSCESS N/A 2017    Procedure: INCISION AND DRAINAGE OF JEET ANAL ABSCESS;  Surgeon: Lynette Smith MD;  Location: [facility] OR;  Service:     INGUINAL HERNIA REPAIR Bilateral 2023    Procedure: INGUINAL HERNIA BILATERAL REPAIR LAPAROSCOPIC WITH DAVINCI ROBOT WITH MESH;  Surgeon: Tahira Rivera MD;  Location: [facility] OR;  Service: Robotics - DaVinci;  Laterality: Bilateral;    INSERTION HEMODIALYSIS CATHETER N/A 2025    Procedure: HEMODIALYSIS CATHETER PLACEMENT;  Surgeon: Gil Pineda DO;  Location: [facility] HYBRID OR;  Service: Vascular;  Laterality: N/A;    MYRINGOTOMY W/ TUBES Left 2017    06/10/2016    TONSILLECTOMY      TOTAL HIP ARTHROPLASTY Right        Family History  Family History   Problem Relation Age of Onset    Breast cancer Mother     Dementia Father     Glaucoma Father     No Known Problems Daughter     Colon polyps Neg Hx     Colon cancer Neg Hx        Social History  Social History     Socioeconomic History    Marital status:    Tobacco Use    Smoking status: Former     Current packs/day: 0.00     Average packs/day: 0.5 packs/day for 25.8 years (12.9 ttl pk-yrs)     Types: Cigarettes     Start date:      Quit date: 10/13/2013     Years since quittin.6     Passive exposure: Past    Smokeless tobacco: Never    Tobacco comments:     quit 2013   Vaping Use    Vaping status: Former    Substances: Nicotine  "   Devices: Pre-filled or refillable cartridge   Substance and Sexual Activity    Alcohol use: Not Currently    Drug use: No    Sexual activity: Not Currently     Partners: Female         Review of Systems:  History obtained from chart review and the patient  General ROS: No fever or chills  Respiratory ROS: No cough, shortness of breath, wheezing  Cardiovascular ROS: no chest pain or dyspnea on exertion  Gastrointestinal ROS: No abdominal pain or melena  Genito-Urinary ROS: No dysuria or hematuria  14 point ROS reviewed with the patient and negative except as noted above and in the HPI unless unable to obtain.    Objective:  /46 (BP Location: Left arm, Patient Position: Lying)   Pulse 64   Temp 97.9 °F (36.6 °C) (Oral)   Resp 16   Ht 182.9 cm (72\")   Wt 60.5 kg (133 lb 4.8 oz)   SpO2 100%   BMI 18.08 kg/m²     Intake/Output Summary (Last 24 hours) at 6/20/2025 1602  Last data filed at 6/19/2025 2158  Gross per 24 hour   Intake --   Output 700 ml   Net -700 ml     General: awake/alert   HEENT: Normocephalic atraumatic head  Neck: Supple with no JVD or carotid bruit  Chest:  clear to auscultation bilaterally without respiratory distress  CVS: regular rate and rhythm  Abdominal: soft, nontender, normal bowel sounds  Extremities: Decreased pulses at right foot.  Skin: warm and dry without rash      Labs:    Results from last 7 days   Lab Units 06/20/25  0307 06/19/25  1506   WBC 10*3/mm3 5.40 5.65   HEMOGLOBIN g/dL 7.9* 8.1*   HEMATOCRIT % 24.8* 25.3*   PLATELETS 10*3/mm3 111* 109*       Results from last 7 days   Lab Units 06/20/25  1438 06/20/25  0307 06/19/25  1506   SODIUM mmol/L 138 137 135*   POTASSIUM mmol/L 3.7 4.0 4.0   CHLORIDE mmol/L 101 101 98   CO2 mmol/L 27.0 24.0 27.0   BUN mg/dL 11.1 38.9* 32.8*   CREATININE mg/dL 1.20 3.03* 2.66*   CALCIUM mg/dL 8.6 8.7 8.6   BILIRUBIN mg/dL  --   --  0.3   ALK PHOS U/L  --   --  180*   ALT (SGPT) U/L  --   --  10   AST (SGOT) U/L  --   --  17   GLUCOSE " "mg/dL 84 102* 107*   EGFR mL/min/1.73 62.3 20.5* 24.0*         Radiology:   Imaging Results (Last 24 Hours)       Procedure Component Value Units Date/Time    IR Angiogram Extremity - In process [[id number]] Resulted: 06/20/25 1525     Updated: 06/20/25 1525    This result has not been signed. Information might be incomplete.      US Arterial Doppler Lower Extremity Right [[id number]] Collected: 06/19/25 1819     Updated: 06/19/25 1824    Narrative:      History: PAD     Comments: Grayscale imaging as well as color flow duplex were used to  evaluate the right lower extremity arterial system     On the right, the peak systolic velocity in the common femoral artery  measured 69 cm/s. In the profunda femoris artery measured 47.6 cm/s. In  the proximal SFA measured 69 cm/s. The mid SFA measured 56.3 cm/s. In  the distal SFA measured 57.9 cm/s. In the popliteal artery measured 31.2  cm/s. In the posterior tibial artery measured 34.9 cm/s. In the anterior  tibial artery measured 22 cm/s.          Impression:      No hemodynamically significant stenosis identified in the  right lower extremity. There is near continuous flow in the tibial  vessels which indicates a significant stenosis versus occlusion of the  more proximal arteries. There is monophasic flow in the right common  femoral artery which may indicate more proximal stenosis.     This report was signed and finalized on 6/19/2025 6:21 PM by Blayne Zabala.               Culture:  No components found for: \"WOUNDCUL\", \"3\"  No components found for: \"CSFCUL\", \"3\"  No components found for: \"BC\", \"3\"  No components found for: \"URINECUL\", \"3\"      Assessment   1.  End-stage renal disease on maintenance dialysis.  2.  Hypertensive renal disease.  3.  History of severe peripheral vascular disease/coronary artery disease.  4.  Right leg ischemia.  5.  Anemia of chronic kidney disease.  6.  History of carotid occlusive disease    Plan:  1.  Patient has received hemodialysis " treatment and tolerated well.  2.  Resume ABILIO.  3.  Vascular surgery/Dr. Zabala plan tentative arteriogram followed by intervention.      Thank you for the consult, we appreciate the opportunity to provide care to your patients.  Feel free to contact me if I can be of any further assistance.      Giuliano Saldana MD  6/20/2025  16:02 CDT

## 2025-06-20 NOTE — OP NOTE
Patient Name: Amy  MRN: [id number]  : 1950    PREOPERATIVE DIAGNOSIS: Vascular occlusion [I99.8]  CL TI right lower extremity rest pain     POSTOPERATIVE DIAGNOSIS: Post-Op Diagnosis Codes:     * Vascular occlusion [I99.8]  Same    PROCEDURE PERFORMED:   Left common femoral artery access with ultrasound guidance  Right lower extremity arteriogram with third order selection angiographic interpretation  intravascular lithotripsy of the P3 popliteal artery and TP trunk with a 3 x 80 shockwave balloon  PTA of the popliteal artery, TP trunk, and peroneal artery with a 3 to 2.5 x 210 Tammy cross balloon   PTA of the right posterior tibial artery 1.5 x 40 Tammy cross balloon  Closure of the left common femoral artery access site with a 6 Wolof minx     SURGEON: Denzel Zabala MD     ANESTHESIA: MAC    PREPARATION: Routine.    STAFF: Circulator: Emilia Kline RN  Scrub Person: Adonis Anaya  Assistant: Andrea Garcia  Vascular Radiology Technician: Priscila Stacy    Estimated Blood Loss: minimal    SPECIMENS: None    COMPLICATIONS: None at this time    ANGIOGRAPHIC FINDINGS:  There is severe calcified disease at all levels.  The bilateral common iliac and external iliac arteries were without significant stenosis.  Right common femoral artery was patent without significant stenosis.  There is severe Masood disease throughout the SFA and proximal popliteal artery.  This appeared unchanged from the previous arteriogram.  There is a P3 pop occlusion that appears to be new from the previous arteriogram.  This was the only significant change that was appreciated on this arteriogram.  There is complete occlusions of all 3 tibial vessels.    INDICATIONS: Ricardo Hugo is a 75 y.o. male with severe peripheral arterial disease.  Patient went a right lower extremity arteriogram approximately 2 months ago for right lower extremity rest pain.  At that time the entire SFA was shockwave and angioplasty.   Yesterday the patient once again presented with rest pain and near continuous flow in the right lower extremity tibial vessels. The indications, risks, and possible complications of the procedure were explained to the patient, who voiced understanding and wished to proceed with surgery.     PROCEDURE IN DETAIL: The patient was taken to the operating room and placed on the operating table in a supine position. After MAC anesthesia was obtained, the bilateral groins were prepped and draped in a sterile manner.  A timeout was then conducted for everyone agreed on correct patient procedure and laterality.  The left common femoral artery was then evaluated with ultrasound.  It was noted to have severe disease, and severe calcification.  The left common femoral artery was then accessed under ultrasound guidance.  A picture of this was taken and saved the patient's chart.  A micropuncture wire was then advanced through the needle.  The needle was then exchanged for micropuncture sheath.  Wire was then exchanged for a 035 wire.  The patient was then systemically heparinized and additional boluses were given as needed.  The sheath was then exchanged for a 5 French sheath.  UF catheter was then advanced over the wire and then to the abdominal aorta.  A pelvic arteriogram was then obtained showing the above findings.  Wire and catheter were then advanced into the right external iliac artery.  A right lower extremity arteriogram was then obtained showing the above findings, and prompting intervention.  The wire was then readvanced into the catheter.  The catheter and sheath were then exchanged for a 6x60 Fr sheath.     Through a combination of wires and catheters I was able to traverse through the diseased segment.  A 3 x 80 shockwave balloon was then used for intravascular lithotripsy of the occluded P3 segment of the popliteal artery.  A wire was then able to be advanced to the very distal portion of the peroneal artery.   Several angioplasties were then performed extending from the distal popliteal artery into the peroneal artery with a 3 x 2.5 x 210 shockwave balloon.  Despite multiple angioplasties were unable to advance the balloon beyond the mid segment of the peroneal artery due to severe calcification.  I then withdrew the wire and advanced it down into the distal posterior tibial artery.  I then dilated this several times with a 1.5 x 40 Tammy cross balloon.  However due to the severe calcification I was unable to advance this down to the very distal posterior tibial artery where there was questionable reconstitution.  At this point the only change that I can appreciate from the completion rounds of his previous arteriogram had been treated.  Postintervention arteriogram showed the P3 popliteal artery now to be widely patent.  TP trunk was widely patent.  The treated portions of the peroneal and posterior tibial artery appear to be patent.    A Mynx 6 Estonian closure device was then used and the sheath was removed.  Pressure was held.  Sterile dressings were applied. The patient tolerated the procedure well. Sponge and needle counts were correct. The patient was then awoken from anesthesia in the operating room and taken to the recovery room in good condition.    Blayne Zabala MD  Date: 6/20/2025 Time: 17:12 CDT

## 2025-06-20 NOTE — NURSING NOTE
[facility]  DIALYSIS TREATMENT SUMMARY     Note: Consult with the attending physician for patient treatment orders, this document is not a physician order.     Patient Information  Patient Ricardo Hugo  Date of Birth February 22, 1950  Chart Number [id number]  Location [facility]  Location MRN [id number]  Gender Male  SSN (last 4)   Treatment Information  Treatment Type Hemodialysis  Treatment Id [id number]  Start Time June 20, 2025 09:09  End Time June 20, 2025 12:46  Acutal Duration 03:37  Treatment Balances  Total Saline Administered 500  Net Fluid Balance -1200  Hemodialysis Orders  Therapy Standard  Orders Verified Time 06/20/2025 08:25   Date Verified 06/20/2025  Duration 3:30  Isolated UF/Bypass No  BFR (mL) 450  DFR X1.5 BFR  DFR (mL) 700  Dialyzer Type OPTIFLUX 160NR  UF Order UF Range  UF Range (mL) 0 - 1200  With BP Parameters Yes  As Tolerated Yes  Crit-Line used No  Heparin Initial Units Bolus No  Heparin IV Maintenance Bolus No  Heparin IV Infusion No  Potassium (mEq/L) 3  Calcium (mEq/L) 2.5  Bicarb (mg/dL) 35  Sodium (mEq/L) 138  Additional Orders KEEP SYSTOLIC BLOOD PRESSURE GREATER THAN 90.  Clinician Nav Will RN  Dialysis Access  Access Type Central Venous Catheter  Central Venous Catheter  Access Type Catheter - Tunneled  Access Location Chest Wall - R  1st Use Catheter Verified by X-Ray  Catheter Care Completed per Policy Yes  Dressing Dry and Intact on Arrival Yes  Dressing Changed No  Type of Dressing Film Biopatch/CHG  Last Date Changed 06/20/2025  If No select Reason Dressing Change Not Indicated per Policy  Type of HD Caps Curos  HD Caps Changed Yes  CVC Line Education Provided Yes - Dressing Change Frequency  Comments/Notes RIGHT IJ PERMCATH, PATENT X 2 PORTS, SITE CLEAR  Vitals  Pre-Treatment Vitals  Time Is BP being recorded? Pre BP Map BP Method Pulse RR Temp How was Weight Obtained? Pre Weight Previous Dry Weight Previous Post  Weight Metric Target Fluid Removal (mL) Dialysate Confirmed Clinician  06/20/2025 09:05 BP/Map 137/56 (83) Noninvasive 59 20 98.2 How Obtained: Bed Scale 60.5 59.6  Kgs 0 Yes Nav Will, DEMETRIUS  Comments: UF FLUID REMOVAL RANGE 0 ML TO 1200 ML AS TOLERATED. PATIENT IDENTIFICATION VERIFIED, ALLERGIES AND MEDICATIONS REVIEWED. PATIENT IS ALERT, ORIENTED X 3, NO PAIN, NO CHEST PAIN. RESPIRATIONS EVEN AND UNLABORED. LUNGS CLEAR AND DIMINISHED. O2 AT 2 LITERS / MINUTE PER NASAL CANNULA. SKIN WARM AND DRY. NO EDEMA. HEART RATE REGULAR, S1 S2, SINUS BRADYCARDIA PER TELEMETRY. LAYING IN BED, HEAD OF BED ELEVATED 30 DEGREES. ON HEPARIN DRIP 14.9 UNITS / KG / HR.  Intra Procedure Vitals  Rec Type Time Is BP being recorded? BP Map Pulse RR BFR DFR AP  TMP UFR RMVD Bolus NSS Flush Chills Safety Check Crit-Line HCT Crit-Line BV% Clinician  E 06/20/2025 09:09 BP/Map 125/57 (80) 58 20 150 500 -30 30 70 490 0  250  Yes   Nav Will, RN  Comments: HEMODIALYSIS INITIATED USING RIGHT IJ PERMCATH. ACCESSED PER STERILE TECHNIQUE. PORTS PATENT X 2. LINES CONNECTED, SECURED, VISIBLE. HEMOCLIP DEVICES INTACT X 2. PATIENT IS ALERT, ORIENTED X 3, NO PAIN, NO CHEST PAIN. RESPIRATIONS EVEN, LUNGS CLEAR AND DIMINISHED. O2 AT 2 LITERS / MINUTE PER NASAL CANNULA. SKIN WARM AND DRY. NO EDEMA. HEART RATE REGULAR, S1 S2, SINUS BRADYCARDIA PER TELEMETRY. LAYING IN BED, HEAD OF BED ELEVATED 30 DEGREES. HEPARIN DRIP AT 14.9 UNITS / KG / HOUR IV.  E 06/20/2025 09:10 BP/Map 125/57 (80) 58 20 450 700 -160 90 80 490 8  0  Yes   Nav Will, RN  Comments: INCREASED BLOOD FLOW RATE AND DIALYSATE FLOW RATE PER ORDER.  E 06/20/2025 09:30 BP/Map 137/59 (85) 60 20 450 700 -180 130 60 490 184  0  Yes   Nav Will, RN  Comments: PATIENT IS RESTING, EYES CLOSED, FLACC 0. RESPIRATIONS EVEN AND UNLABORED. O2 AT 2 LITERS / MINUTE PER NASAL CANNULA. SKIN WARM AND DRY. HEART RATE REGULAR, S1 S2, SINUS RHYTHM PER TELEMETRY. LAYING IN BED, HEAD OF BED ELEVATED 30  DEGREES. LINES VISIBLE AND SECURE.  E 06/20/2025 10:00 BP/Map 143/64 (90) 62 20 450 700 -190 130 60 490 425  0  Yes   Nav Will RN  Comments: PATIENT IS RESTING, EYES CLOSED, FLACC 0. RESPIRATIONS EVEN AND UNLABORED. O2 AT 2 LITERS / MINUTE PER NASAL CANNULA. SKIN WARM AND DRY. HEART RATE REGULAR, S1 S2, SINUS RHYTHM PER TELEMETRY. NO SIGNS OF DISTRESS. LAYING IN BED, HEAD OF BED ELEVATED 30 DEGREES. LINES VISIBLE AND SECURE. HEPARIN DRIP CONTINUES.  E 06/20/2025 10:30 BP/Map 144/62 (89) 63 18 450 700 -190 140 60 490 669  0  Yes   Nav Will, DEMETRIUS  Comments: PATIENT IS RESTING, EYES CLOSED, FLACC 0. RESPIRATIONS EVEN AND UNLABORED. O2 AT 2 LITERS / MINUTE PER NASAL CANNULA. SKIN WARM AND DRY. HEART RATE REGULAR, S1 S2, SINUS RHYTHM PER TELEMETRY. LAYING IN BED, HEAD OF BED ELEVATED 30 DEGREES. LINES VISIBLE AND SECURE. HEPARIN DRIP CONTINUES.  E 06/20/2025 11:00 BP/Map 143/57 (86) 63 18 450 700 -180 140 60 490 914  0  Yes   Nav Will, DEMETRIUS  Comments: PATIENT IS ALERT, ORIENTED X 3, COMPLAINTS OF LEG PAIN. GIVEN DILAUDID 0.5 MG IV PER FLOOR NURSE. RESPIRATIONS EVEN AND UNLABORED. O2 AT 2 LITERS / MINUTE PER NASAL CANNULA. HERAT RATE REGULAR, S1 S2, SINUS RHYTHM PER TELEMETRY. SKIN WARM AND DRY. LAYING IN BED, HEAD OF BED ELEVATED 30 DEGREES. LINES VISIBLE AND SECURE.  E 06/20/2025 11:30 BP/Map 117/63 (81) 61 20 450 700 -190 140 60 490 1153  0  Yes   Nav Will, DEMETRIUS  Comments: PATIENT IS RESTING, EYES CLOSED, FLACC 0. RESPIRATIONS EVEN AND UNLABORED. O2 AT 2 LITERS / MINUTE PER NASAL CANNULA. SKIN WARM AND DRY. HEART RATE REGULAR, S1 S2, SINUS RHYTHM PER TELEMETRY. LAYING IN BED, HEAD OF BED ELEVATED 30 DEGREES. LINES VISIBLE AND SECURE.  E 06/20/2025 12:00 BP/Map 140/61 (87) 64 18 450 700 -190 140 60 490 1395  0  Yes   Nav Will RN  Comments: PATIENT IS RESTING, EYES CLOSED, FLACC 0. RESPIRATIONS EVEN AND UNLABORED. O2 AT 2 LITERS / MINUTE PER NASAL CANNULA. SKIN WARM AND DRY. HEART RATE REGULAR, S1  S2, SINUS RHYTHM PER TELEMETRY. NO SIGNS OF DISTRESS. LAYING IN BED, HEAD OF BED ELEVATED 30 DEGREES. LINES VISIBLE AND SECURE.  E 06/20/2025 12:30 BP/Map 143/57 (86) 62 18 450 700 -190 140 60 490 1638  0  Yes   Nav Will, DEMETRIUS  Comments: PATIENT IS RESTING, EYES CLOSED, FLACC 0. RESPIRATIONS EVEN AND UNLABORED. HEART RATE REGULAR, S1 S2, SINUS RHYTHM PER TELEMETRY. O2 AT 2 LITERS / MINUTE PER NASAL CANNULA. SKIN WARM AND DRY. LAYING IN BED, LINES VISIBLE AND SECURE.  E 06/20/2025 12:46 BP/Map 135/58 (84) 62 18 150 500 -10 30 50 300 1700  250  Yes   Nav Will, DEMETRIUS  Comments: HEMODIALYSIS FINISHED, BLOOD RETURNED.  Post Treatment Vitals  Time Is BP being recorded? BP Map Pulse RR Temp How was Weight Obtained? Post Weight Metric BVP UF Goal Ordered NSS Given Intra-Procedure Total Machine UF Removed (mL) Crit-Line Ending Profile Crit-Line Refill Crit-Line Ending HCT Crit-Line Max BV% Clinician  06/20/2025 13:02 BP/Map 158/58 (91) 62 18 97.7 How Obtained: Bed scale 59.3 Kgs 91.7 [phone]     Nav Will, DEMETRIUS  Comments: PATIENT IS ALERT, ORIENTED X 3, NO PAIN, NO CHEST PAIN. RESPIRATIONS EVEN AND UNLABORED. LUNGS CLEAR AND DIMINISHED. O2 AT 2 LITERS / MINUTE PER NASAL CANNULA. SKIN WARM AND DRY. NO EDEMA. TOLERATED TREATMENT WELL. HEART RATE REGULAR, S1 S2, SINUS RHYTHM PER TELEMETRY.  Safety checks include: access uncovered and secured, Hemaclip secured for all central line access, machine checks performed, and alarm limits confirmed.  Labs  Hepatitis  HBsAG Lab Result HBsAG Lab Result HBsAG Draw Date Transient Antigenemia(MD Diagnosis Only) Anti-HBs Lab Result Anti-HBs Lab Value Anti-HBs Draw Date Documented By Documented Date Hepatitis Status Hepatitis Status  Negative  06/09/2025  Negative  04/30/2025 Nav Will 06/20/2025  Susceptible  Notes:   Pre-Treatment Hepatitis Precautions Patient tx outside buffer zone Yes Hepatitis Information Entered By Nav Will, RN  Patient tx at bedside 1:1 Yes Hard  copy verified by nurse and placed in patient’s hospital chart Yes Signing  Patient tx with immune pts only Yes Copy of hepatitis results verified in hospital EMR Yes Signed By Nav Will RN  Pre-Treatment Handoff  Staff Report Received Yes  Report Given by Primary Nurse RIKY PATEL RN  Time 08:20  Patient Arrival  Patient ID Verified Date of Birth  MRN  Full Name  Patient Consent to treatment verified Yes  Blood Transfusion Consent Verified N/A  Treatment Comments  Treatment Notes PATIENT IS ALERT, ORIENTED X 3, NO PAIN. NO CHEST PAIN. RESPIRATIONS 18 EVEN AND UNLABORED. LUNGS CLEAR AND DIMINISHED. O2 AT 2 LITERS / MINUTE PER NASAL CANNULA. SKIN WARM AND DRY, NO EDEMA. HEART RATE REGULAR, S1 S2, SINUS RHYTHM PER TELEMETRY AT 62. BLOOD PRESSURE 158/58, MAP 91, TEMPERATURE 97.7 DEGREES. REMOVED 1200 ML OF FLUID ON DIALYSIS. POST WEIGHT IS 59.3 KG. TOLERATED TREATMENT WELL.  Post-Treatment Handoff  Report Given to Primary Nurse RIKY PATEL RN  Time Report Given 13:14  Report Given By Nav Will RN  Machine Validation  Time 08:48  Date 6/20/2025  Auto Alarm Test Passed Yes  Machine Serial # [id number]  Portable RO Yes  RO Serial# [id number]  Residual Bleach Negative Yes  Was a manufactured mix used? Yes  Machine Log Completed Yes  Total Chlorine (less than 0.1)? Yes  Total Chlorine Log Completed Yes  Bicarb BiBag  Bibag Size 900  Machine Temp 36.8  Machine Conductivity 13.8  Meter Type N/A  Meter Conductivity   Independent Conductivity 13.9  pH Status Pass Pass  pH   TCD Value 13.8  TCD Alarm with +/- 0.5 Yes  NVL enabled validated 100 asymmetric Yes  Safety check complete Nav Will RN  Second Verification Performed? Yes  Second Verification Performed By Nav Will RN  Reason Not Verified   Serum Lab Values  Time BUN Creatinine Na K (mEq/L) Cl CO2 Ca (mEq/L) Phos Mg (mg/dL) Alb (g/dL) Glucose Hgb Hct WBC Plt PT aPTT INR Other Clinician  06/20/2025  03:07 38.9 3.03 137 4 101 24 8.7 - - - 102 7.9 24.8 5.4 111 - - - - Nav Will RN  Comments:   Administered Medications  Time Medication Dose Route Reason for Admin Clinician  06/20/2025 12:50 HEPARIN 1000 UNITS / ML 3.2 ML / 3200 UNITS IVP CATHETER LOCKS Nav Will RN  Comments: POST DIALYSIS, INSTILL HEPARIN 1000 UNITS / ML, 1.6 ML, 1600 UNITS INTO EACH PORT OF RIGHT IJ PERMCATH, X 2 PORTS, 3.2 ML TOTAL, 3200 UNITS HEPARIN TOTAL AS CATHETER LOCKS.  Facility Information Room # 361 - 3C Diagnosis RIGHT LEG PAIN / VASCULAR OCCLUSION; ESRD; CHRONIC LYMPHOCYTIC LEUKEMIA; HYPERTENSION; CHF; PERIPHERAL ARTERIAL DISEASE  Facility Information Stat Treatment No Isolation Information  Admission Date 06/19/2025 Patient Type Chronic dialysis patient with diagnosis of ESRD Isolation Required? N  Ordering MD DR. VIPIN DO [facility] [place] Completed by  Account/Finance Number [id number] Patient Home Unit [address] [place] [address] information Entered by Nav Will RN  Admission Status InPatient Code Status Full Code   Location Dialysis Suite Multi Diagnosis   Start Treatment Time Out Confirmed by RIKY PATEL RN AND NAV WILL RN Correct access site verified Yes  Treatment Initiation Connections Secured Time Out Completed 09:01 Treatment Start Date 06/20/2025  Saline line double clamped Correct patient verified Yes Treatment Start Time 09:09  Hemaclip Applied Correct procedure verified Yes Patient/Family questions and concerns addressed Yes  Pre Focused Assessment Notes RESPIRATIONS 20, O2 AT 2 LITERS / MINUTE PER NASAL CANNULA LOC Alert and Oriented x3  Access Edema GI / Bowels  Signs and Symptoms of Infection? No Location None GI Soft  Notes RIGHT IJ PERMCATH, PATENT X 2 PORTS, SITE CLEAR Cardiac  Bowel sounds present  Pain Screening Heart Rhythm Regular  Non distended  Does the patient have pain? No Telemetry Yes   Respiratory Notes SINUS BRADYCARDIA  Voids  Lung Sounds Clear Skin Completed  by  Diminished Skin Bruising Pre Treatment Focused Assessment Completed By Nav Will RN  Location Bilateral  Warm Time 09:03  Position Anterior  Dry Signing  Respiratory Efforts Unlabored LOC Signed By Nav Will RN  Education  Fluid Intake  Medications  Patient Education  Hemodialysis treatment review  Diet and/or exercise  Patient Educated? Yes Method Knowledge / Understanding Assessed Demonstration  Fluid Intake  Focus or Topic Treatment Options Family Education Provided? N/A  Hemodialysis treatment review  Access Maintenance Caregiver Education Provided? Yes Method Knowledge / Understanding Assessed Teach back  Medications Focus or Topic Access Maintenance Patient Education Introduced By  Diet and/or exercise  Treatment Options Patient Education Introduced By Nav Will RN  Post-Treatment HD machine external disinfection completed per policy Yes Notes VERBAL REPORT GIVEN TO RIKY PATEL RN  Post Treatment Delay PRO external disinfection completed per policy Yes Completed by  Delay N Post Treatment General Information Post Treatment Form Completed By Nav Will RN  Machine Disinfection Requirement Care Transition Document Completed Yes   Post Focused Assessment Lung Sounds Clear  Bruising  Changes from Pre Focused Assessment  Diminished LOC  Changes from Pre Treatment Focused Assessment? Yes Location Bilateral LOC Alert and Oriented x3  Access Position Anterior GI / Bowels  Cath Packed with HEPARIN 1000 UNITS / ML Respiratory Efforts Unlabored GI Soft  Access Port(ml) 1.6 Notes RESPIRATIONS 18, O2 AT 2 LITERS / MINUTE PER NASAL CANNULA  Bowel sounds present  Return Port(ml) 1.6 Edema  Non distended  Catheter clamped and capped Yes Location None   Access Flow Good Cardiac  Voids  Dialyzer Clearance Streaked Heart Rhythm Regular Completed by  Dialyzer Clearance Times RIGHT IJ PERMCATH, FLUSHED, HEPARINIZED, CLAMPED, CAPPED Telemetry Yes Post Treatment Focused Assessment Completed by Nav  Suman, RN  Notes RIGHT IJ PERMCATH, FLUSHED, HEPARINIZED, CLAMPED, CAPPED Notes SINUS RHYTHM Date 06/20/2025  Pain Screening Skin Time 13:04  Does the patient have pain? No Skin Warm Signing  Respiratory  Dry Signed By Nav Will, RN

## 2025-06-20 NOTE — ANESTHESIA PROCEDURE NOTES
Peripheral IV    Patient location during procedure: holding area  Start time: 6/20/2025 3:08 PM  Line placed for Fluids/Medication Admin.  Preanesthetic Checklist  Completed: patient identified, IV checked, site marked, risks and benefits discussed, surgical consent, monitors and equipment checked, pre-op evaluation and timeout performed  Peripheral IV Prep   Patient position: supine   Prep: ChloraPrep  Peripheral IV Procedure   Laterality:right  Location:  Antecubital  Catheter size: 18 G  Guidance: ultrasound guided          Post Assessment   Dressing Type: tape and transparent.    IV Dressing/Site: clean, dry and intact

## 2025-06-20 NOTE — NURSING NOTE
[facility]  INPATIENT WOUND & OSTOMY CARE    Today's Date: 06/20/25    Patient Name: Ricardo Hugo  MRN: [id number]  CSN: [id number]  PCP: Nathan Zimmer MD  Attending Provider: Dc Issa DO  Length of Stay: 1    Wound care consulted for wounds to the lower extremity. Nursing has uploaded picture to chart. Patient was admitted with vascular occlusion on 6/19/2025. Patient is currently  in Dialysis when rounded on. Pressure prevention orders placed until wound care can assess wounds.        Wound RN Orders (last 12 hours) (12h ago, onward)       Start     Ordered    06/20/25 1241  Use Repositioning Wedge to Position Patient  Continuous         06/20/25 1240    06/20/25 1241  Specialty Bed Dolphin FIS Mattress  Once        Comments: For Dolphin FIS Mattress, call [employer] to order a [employer] bed frame.  If bariatric/bariatric dolphin, [employer] provides frame, mattress, pump, and trapeze (if needed).  Nurse or HUC is to call [employer], the rental company, to order the equipment, provide patient's name, room number, and if in isolation. [phone].   Question:  Specialty Bed needed  Answer:  Dolphin FIS Mattress    06/20/25 1240    06/20/25 1240  Turn Patient  Now Then Every 2 Hours         06/20/25 1240    06/20/25 1240  Elevate Heels Off of Bed  Until Discontinued         06/20/25 1240    06/20/25 1240  Use Seat Cushion When Up In Chair  Continuous         06/20/25 1240    06/20/25 1240  Silicone Border Dressing to Bony Prominences  Per Order Details        Comments: Apply silicone border foam dressing per protocol to sacral spine/bilateral heels for protection. Nursing to change dressing every 3 days and PRN if soiled. Nursing is to peel back dressing with every assessment to assess skin underneath dressing. No barrier cream under dressing.    06/20/25 1240    Unscheduled  Wound Care  As Needed      Question:  Wound Care Instructions  Answer:  Apply Moisture Barrier After Any Incontinence     06/20/25 1240          Inpatient wound care will continue to follow during hospital stay.  Please contact if any issues or concerns arise.     This document has been electronically signed by Amber Waggoner RN on 6/20/2025 12:40 CDT

## 2025-06-20 NOTE — PLAN OF CARE
Goal Outcome Evaluation:              Outcome Evaluation: ALOX4. RA. voiding via urinal. on tele. NPO for possible  plan for lower ext arteriogram today

## 2025-06-20 NOTE — H&P
[facility] Medicine Services  HISTORY AND PHYSICAL    Date of Admission: 6/19/2025  Primary Care Physician: Nathan Zimmer MD    Subjective   Primary Historian: Patient    Chief Complaint: right foot pain    History of Present Illness  75-year-old male with past medical history of coronary artery disease/CVA EEG, peripheral vascular disease, carotid stenosis, end-stage renal disease, COPD there is referred to the hospital due to pain in right lower extremity.  He has had pain for several months and has had intervention in this extremity in the extremity in the past by vascular surgeons.  He is considered to have an arterial obstruction distal with some skin breakdown in toes.  He is admitted for vascular surgery evaluation.    Review of Systems   Otherwise complete ROS reviewed and negative except as mentioned in the HPI.    Past Medical History:   Past Medical History:   Diagnosis Date    3-vessel CAD 08/11/2020    Allergic rhinitis     Anemia     Anxiety disorder 04/27/2020    Arthritis     Asymmetrical sensorineural hearing loss 06/28/2017    Atherosclerosis of native artery of both lower extremities with intermittent claudication 07/18/2019    Avascular necrosis of femoral head, left 07/11/2020    right hip after surgery    Carotid stenosis     Chronic mucoid otitis media     Chronic rhinitis     COPD (chronic obstructive pulmonary disease)     Coronary artery disease     HEART BYPASS 2004    Crohn's disease of large intestine with other complication 07/30/2020    Chronic diarrhea Colonoscopy July 2020 revealed mild patchy scattered hemosiderin staining with inflammation more so in rectosigmoid area.  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    Iron deficiency anemia 07/14/2020    Mixed hearing loss of left ear     PAD (peripheral artery disease) 08/11/2020    Perianal abscess     Pernicious anemia 08/17/2020    took shots but never diagnosed with b12 deficiency    Personal history of alcoholism 08/11/2020    quit drinking in 2013    Prostatic hypertrophy 08/11/2020    Sensorineural hearing loss     Sepsis with acute renal failure 09/15/2020    Shortness of breath 05/27/2021    Tinnitus     Ventricular tachycardia, nonsustained 07/14/2020    Weight loss 07/11/2020     Past Surgical History:  Past Surgical History:   Procedure Laterality Date    AORTOGRAM Right 4/25/2025    Procedure: RIGHT LOWER EXTREMITY ANGIOGRAM, SHOCKWAVE LITHOTRIPSY, BALLOON ANGIOPLASTY, MYNX CLOSURE;  Surgeon: Gil Pineda DO;  Location: [facility] HYBRID OR;  Service: Vascular;  Laterality: Right;    AORTOGRAM Left 5/22/2025    Procedure: LEFT LOWER EXTREMITY ANGIOGRAM, SHOCKWAVE LITHOTRIPSY, BALLOON ANGIOPLASTY, STENT PLACEMENT, MYNX CLOSURE;  Surgeon: Blayne Zabala MD;  Location: [facility] HYBRID OR;  Service: Vascular;  Laterality: Left;    AORTOGRAM Right 5/30/2025    Procedure: AORTOILIAC ANGIOGRAM WITH LEFT LOWER EXTREMITY ANGIOGRAM;  Surgeon: Gil Pineda DO;  Location: [facility] HYBRID OR;  Service: Vascular;  Laterality: Right;    ARTERY SURGERY  2021    right side on neck    CAROTID ENDARTERECTOMY Right 05/10/2021    Procedure: RIGHT CAROTID ENDARTERECTOMY WITH EEG;  Surgeon: Gil Pineda DO;  Location: [facility] HYBRID OR 12;  Service: Vascular;  Laterality: Right;    COLONOSCOPY N/A 07/02/2020    Procedure: COLONOSCOPY WITH ANESTHESIA;  Surgeon: Adrien Brewster MD;  Location: [facility] ENDOSCOPY;  Service: Gastroenterology;  Laterality: N/A;  pre op: diarrhea  post op: polyps  PCP: Joe Velasco MD    COLONOSCOPY N/A  10/13/2020    Procedure: COLONOSCOPY WITH ANESTHESIA;  Surgeon: Adrien Brewster MD;  Location: [facility] ENDOSCOPY;  Service: Gastroenterology;  Laterality: N/A;  Pre: Chronic Diarrhea, Crohn's  Post: AVM  Dr. Neftali Velasco  CO2 Inflation Used    COLONOSCOPY N/A 12/08/2023    Procedure: COLONOSCOPY WITH ANESTHESIA;  Surgeon: Adrien Brewster MD;  Location: [facility] ENDOSCOPY;  Service: Gastroenterology;  Laterality: N/A;  pre chrone's disease  post sub optimal prep, polyp, chrone's      CORONARY ARTERY BYPASS GRAFT  2003    x3    ENDOSCOPY N/A 11/02/2021    Procedure: ESOPHAGOGASTRODUODENOSCOPY WITH ANESTHESIA;  Surgeon: Bridger Bell MD;  Location: [facility] ENDOSCOPY;  Service: Gastroenterology;  Laterality: N/A;  pre anemia;gi bleed  post  gi bleed;schatski ring  Dr. ERIC Velasco    ENDOSCOPY N/A 10/10/2023    Procedure: ESOPHAGOGASTRODUODENOSCOPY WITH ANESTHESIA;  Surgeon: Adrien Brewster MD;  Location: [facility] ENDOSCOPY;  Service: Gastroenterology;  Laterality: N/A;  preop; anemia  postop esophagitis ; R/O barretts   PCP Randall Beata    EYE SURGERY Bilateral     catorac    INCISION AND DRAINAGE PERIRECTAL ABSCESS N/A 03/03/2017    Procedure: INCISION AND DRAINAGE OF JEET ANAL ABSCESS;  Surgeon: Lynette Smith MD;  Location: [facility] OR;  Service:     INGUINAL HERNIA REPAIR Bilateral 06/27/2023    Procedure: INGUINAL HERNIA BILATERAL REPAIR LAPAROSCOPIC WITH DAVINCI ROBOT WITH MESH;  Surgeon: Tahira Rivera MD;  Location: [facility] OR;  Service: Robotics - DaVinci;  Laterality: Bilateral;    INSERTION HEMODIALYSIS CATHETER N/A 4/16/2025    Procedure: HEMODIALYSIS CATHETER PLACEMENT;  Surgeon: Gil Pineda DO;  Location: [facility] HYBRID OR;  Service: Vascular;  Laterality: N/A;    MYRINGOTOMY W/ TUBES Left 04/17/2017    06/10/2016    TONSILLECTOMY      TOTAL HIP ARTHROPLASTY Right 2006     Social History:  reports that he quit smoking about 11 years ago. His smoking use included cigarettes. He  started smoking about 37 years ago. He has a 12.9 pack-year smoking history. He has been exposed to tobacco smoke. He has never used smokeless tobacco. He reports that he does not currently use alcohol. He reports that he does not use drugs.    Family History: family history includes Breast cancer in his mother; Dementia in his father; Glaucoma in his father; No Known Problems in his daughter.       Allergies:  Allergies   Allergen Reactions    Ondansetron Anaphylaxis and GI Intolerance    Zofran [Ondansetron Hcl] Anaphylaxis    Lortab [Hydrocodone-Acetaminophen] Other (See Comments) and Hallucinations     CLOSTROPHOBIC    Allopurinol Other (See Comments)     Pain on right side       Medications:  Prior to Admission medications    Medication Sig Start Date End Date Taking? Authorizing Provider   albuterol sulfate  (90 Base) MCG/ACT inhaler Inhale 2 puffs Every 6 (Six) Hours As Needed for Wheezing or Shortness of Air.    Twyla Guevara MD   ALPRAZolam (XANAX) 0.25 MG tablet Take 1 tablet by mouth At Night As Needed for Anxiety or Sleep. 6/4/25   Garrett Comer MD   aspirin (aspirin) 81 MG EC tablet Take 1 tablet by mouth Daily.    Twyla Guevara MD   atorvastatin (LIPITOR) 10 MG tablet Take 1 tablet by mouth Daily. 9/4/24   Nathan Zimmer MD   Budeson-Glycopyrrol-Formoterol (Breztri Aerosphere) 160-9-4.8 MCG/ACT aerosol inhaler Inhale 2 puffs 2 (Two) Times a Day.    Twyla Guevara MD   calcitriol (ROCALTROL) 0.5 MCG capsule Take 1 capsule by mouth Daily. 1/28/25   Nathan Zimmer MD   carvedilol (COREG) 12.5 MG tablet Take 1 tablet by mouth 2 (Two) Times a Day With Meals. 6/4/25   Garrett Comer MD   cloNIDine (CATAPRES) 0.3 MG tablet Take 1 tablet by mouth 3 times a day.    Twyla Guevara MD   clopidogrel (PLAVIX) 75 MG tablet Take 1 tablet by mouth Daily.    Twyla Guevara MD   desoximetasone (TOPICORT) 0.25 % cream Apply 1 Application topically to the  appropriate area as directed 2 (Two) Times a Day As Needed for Irritation. irritation 6/4/25   Nathan Zimmer MD   docusate sodium (COLACE) 100 MG capsule Take 1 capsule by mouth 3 (Three) Times a Day As Needed for Constipation.    Twyla Guevara MD   empagliflozin (Jardiance) 10 MG tablet tablet Take 1 tablet by mouth Daily.    Twyla Guevara MD   fluticasone (FLONASE) 50 MCG/ACT nasal spray Administer 2 sprays into the nostril(s) as directed by provider Daily As Needed for Allergies.    Twyla Guevara MD   gabapentin (NEURONTIN) 300 MG capsule Take 1 capsule by mouth Every Night. 6/4/25   Garrett Comer MD   hydrALAZINE (APRESOLINE) 100 MG tablet Take 1 tablet by mouth 3 (Three) Times a Day. 6/5/25   Garrett Comer MD   iron polysaccharides (NIFEREX) 150 MG capsule Take 1 capsule by mouth Daily. 6/4/25   Garrett Comer MD   isosorbide dinitrate (ISORDIL) 10 MG tablet Take 1 tablet by mouth 3 (Three) Times a Day. 1/28/25   Nathan Zimmer MD   levothyroxine (Synthroid) 100 MCG tablet Take 1 tablet by mouth Every Morning. 6/4/25   Nathan Zimmer MD   magnesium chloride ER 64 MG DR tablet Take 1 tablet by mouth Daily.    Twyla Guevara MD   montelukast (SINGULAIR) 10 MG tablet TAKE 1 TABLET EVERY NIGHT 5/27/25   Nathan Zimmer MD   Multiple Vitamins-Minerals (PRESERVISION/LUTEIN) capsule Take 1 capsule by mouth 2 (two) times a day.    Twyla Guevara MD   naloxone (NARCAN) 4 MG/0.1ML nasal spray Call 911. Don't prime. Kathryn in 1 nostril for overdose. Repeat in 2-3 minutes in other nostril if no or minimal breathing/responsiveness. 6/4/25   Garrett Comer MD   NIFEdipine XL (ADALAT CC) 60 MG 24 hr tablet Take 2 tablets by mouth Daily. 6/5/25   Garrett Comer MD   omeprazole (priLOSEC) 20 MG capsule Take 1 capsule by mouth Daily.    Twyla Guevara MD   oxyCODONE-acetaminophen (PERCOCET) 5-325 MG per tablet Take 1 tablet by mouth Every 6 (Six) Hours As  "Needed for Moderate Pain. 6/4/25   Garrett Comer MD   spironolactone (ALDACTONE) 25 MG tablet Take 1 tablet by mouth Daily.    ProviderTwyla MD   tamsulosin (FLOMAX) 0.4 MG capsule 24 hr capsule Take 1 capsule by mouth Every Night.    Twyla Guevara MD   valsartan (DIOVAN) 320 MG tablet Take 1 tablet by mouth Daily. 6/5/25   Garrett Comer MD   vitamin D (ERGOCALCIFEROL) 1.25 MG (51414 UT) capsule capsule Take 1 capsule by mouth 1 (One) Time Per Week. Mondays    ProviderTwyla MD I have utilized all available immediate resources to obtain, update, or review the patient's current medications (including all prescriptions, over-the-counter products, herbals, cannabis/cannabidiol products, and vitamin/mineral/dietary (nutritional) supplements).    Objective     Vital Signs: /56 (BP Location: Right arm, Patient Position: Lying)   Pulse 71   Temp 98.1 °F (36.7 °C) (Oral)   Resp 16   Ht 182.9 cm (72\")   Wt 62.5 kg (137 lb 11.2 oz)   SpO2 94%   BMI 18.68 kg/m²   Physical Exam  Constitutional:       Appearance: He is well-developed. He is not ill-appearing.   HENT:      Head: Normocephalic and atraumatic.      Right Ear: External ear normal.      Left Ear: External ear normal.      Nose: Nose normal.      Mouth/Throat:      Mouth: Mucous membranes are dry.   Eyes:      General:         Right eye: No discharge.         Left eye: No discharge.      Extraocular Movements: Extraocular movements intact.      Conjunctiva/sclera: Conjunctivae normal.      Pupils: Pupils are equal, round, and reactive to light.   Neck:      Vascular: No JVD.   Cardiovascular:      Rate and Rhythm: Regular rhythm. Tachycardia present.      Heart sounds: Normal heart sounds. No murmur heard.  Pulmonary:      Effort: Pulmonary effort is normal. No respiratory distress.      Breath sounds: Normal breath sounds. No wheezing or rales.   Chest:      Chest wall: No tenderness.   Abdominal:      General: Bowel sounds " are normal. There is no distension.      Palpations: Abdomen is soft.      Tenderness: There is no abdominal tenderness. There is no guarding or rebound.   Musculoskeletal:         General: No tenderness or deformity. Normal range of motion.      Cervical back: Normal range of motion and neck supple. No rigidity.      Comments: Right foot called and resident pulses diminished.  Edema with some skin sloughing seen on the third toe of the left foot.   Skin:     General: Skin is warm and dry.      Findings: No rash.   Neurological:      General: No focal deficit present.      Mental Status: He is alert and oriented to person, place, and time. Mental status is at baseline.      Cranial Nerves: No cranial nerve deficit.      Sensory: No sensory deficit.      Motor: Weakness present. No abnormal muscle tone.      Deep Tendon Reflexes: Reflexes normal.   Psychiatric:         Mood and Affect: Mood normal.         Behavior: Behavior normal.              Results Reviewed:  Lab Results (last 24 hours)       Procedure Component Value Units Date/Time    Heparin Anti-Xa [[id number]]  (Abnormal) Collected: 06/19/25 1749    Specimen: Blood Updated: 06/19/25 1807     Heparin Anti-Xa (UFH) <0.10 IU/ml     aPTT [[id number]]  (Normal) Collected: 06/19/25 1506    Specimen: Blood Updated: 06/19/25 1748     PTT 33.3 seconds     Narrative:      PTT = The equivalent PTT values for the therapeutic range of heparin levels at 0.3 to 0.7 U/ml are 77 - 99 seconds.    Comprehensive Metabolic Panel [[id number]]  (Abnormal) Collected: 06/19/25 1506    Specimen: Blood Updated: 06/19/25 1536     Glucose 107 mg/dL      BUN 32.8 mg/dL      Creatinine 2.66 mg/dL      Sodium 135 mmol/L      Potassium 4.0 mmol/L      Chloride 98 mmol/L      CO2 27.0 mmol/L      Calcium 8.6 mg/dL      Total Protein 5.6 g/dL      Albumin 3.3 g/dL      ALT (SGPT) 10 U/L      AST (SGOT) 17 U/L      Alkaline Phosphatase 180 U/L      Total Bilirubin 0.3 mg/dL      Globulin 2.3  gm/dL      A/G Ratio 1.4 g/dL      BUN/Creatinine Ratio 12.3     Anion Gap 10.0 mmol/L      eGFR 24.0 mL/min/1.73     Narrative:      GFR Categories in Chronic Kidney Disease (CKD)              GFR Category          GFR (mL/min/1.73)    Interpretation  G1                    90 or greater        Normal or high (1)  G2                    60-89                Mild decrease (1)  G3a                   45-59                Mild to moderate decrease  G3b                   30-44                Moderate to severe decrease  G4                    15-29                Severe decrease  G5                    14 or less           Kidney failure    (1)In the absence of evidence of kidney disease, neither GFR category G1 or G2 fulfill the criteria for CKD.    eGFR calculation 2021 CKD-EPI creatinine equation, which does not include race as a factor    Magnesium [[id number]]  (Normal) Collected: 06/19/25 1506    Specimen: Blood Updated: 06/19/25 1536     Magnesium 1.9 mg/dL     Phosphorus [[id number]]  (Normal) Collected: 06/19/25 1506    Specimen: Blood Updated: 06/19/25 1533     Phosphorus 3.3 mg/dL     Protime-INR [[id number]]  (Abnormal) Collected: 06/19/25 1506    Specimen: Blood Updated: 06/19/25 1528     Protime 15.7 Seconds      INR 1.19    CBC & Differential [[id number]]  (Abnormal) Collected: 06/19/25 1506    Specimen: Blood Updated: 06/19/25 1516    Narrative:      The following orders were created for panel order CBC & Differential.  Procedure                               Abnormality         Status                     ---------                               -----------         ------                     CBC Auto Differential[[id number]]        Abnormal            Final result                 Please view results for these tests on the individual orders.    CBC Auto Differential [[id number]]  (Abnormal) Collected: 06/19/25 1506    Specimen: Blood Updated: 06/19/25 1516     WBC 5.65 10*3/mm3      RBC 2.49 10*6/mm3      Hemoglobin  8.1 g/dL      Hematocrit 25.3 %      .6 fL      MCH 32.5 pg      MCHC 32.0 g/dL      RDW 17.9 %      RDW-SD 67.7 fl      MPV 9.9 fL      Platelets 109 10*3/mm3      Neutrophil % 65.1 %      Lymphocyte % 15.6 %      Monocyte % 13.3 %      Eosinophil % 4.2 %      Basophil % 0.9 %      Immature Grans % 0.9 %      Neutrophils, Absolute 3.68 10*3/mm3      Lymphocytes, Absolute 0.88 10*3/mm3      Monocytes, Absolute 0.75 10*3/mm3      Eosinophils, Absolute 0.24 10*3/mm3      Basophils, Absolute 0.05 10*3/mm3      Immature Grans, Absolute 0.05 10*3/mm3      nRBC 0.0 /100 WBC           Imaging Results (Last 24 Hours)       Procedure Component Value Units Date/Time    US Arterial Doppler Lower Extremity Right [[id number]] Collected: 06/19/25 1819     Updated: 06/19/25 1824    Narrative:      History: PAD     Comments: Grayscale imaging as well as color flow duplex were used to  evaluate the right lower extremity arterial system     On the right, the peak systolic velocity in the common femoral artery  measured 69 cm/s. In the profunda femoris artery measured 47.6 cm/s. In  the proximal SFA measured 69 cm/s. The mid SFA measured 56.3 cm/s. In  the distal SFA measured 57.9 cm/s. In the popliteal artery measured 31.2  cm/s. In the posterior tibial artery measured 34.9 cm/s. In the anterior  tibial artery measured 22 cm/s.          Impression:      No hemodynamically significant stenosis identified in the  right lower extremity. There is near continuous flow in the tibial  vessels which indicates a significant stenosis versus occlusion of the  more proximal arteries. There is monophasic flow in the right common  femoral artery which may indicate more proximal stenosis.     This report was signed and finalized on 6/19/2025 6:21 PM by Blayne Zabala.       XR Chest 1 View [[id number]] Collected: 06/19/25 1546     Updated: 06/19/25 1552    Narrative:      EXAM: XR CHEST 1 VW-         HISTORY: work up       COMPARISON:  5/29/2025.     TECHNIQUE:  Frontal and lateral views of the chest submitted.     FINDINGS:    There is increasing opacity over the right hemithorax may represent  increasing layering right pleural effusion. Underlying pneumonia not  excluded. There is probable volume overload. A right IJ tunneled central  venous dialysis catheter tip is at the SVC. The patient is status post  median sternotomy and CABG. There is calcification of the thoracic  aorta. No left effusion is seen and no pneumothorax identified. There is  sclerosis at the right humeral head may represent avascular necrosis.          Impression:      1. Increasing diffuse opacity of the right hemithorax may represent  increasing layering right pleural effusion and probable volume overload.     This report was signed and finalized on 6/19/2025 3:48 PM by Eleuterio Rivera.             I have personally reviewed and interpreted the radiology studies and ECG obtained at time of admission.     Assessment / Plan   Assessment:   Active Hospital Problems    Diagnosis     **Vascular occlusion     ESRD (end stage renal disease) on dialysis     Chronic diastolic (congestive) heart failure     CLL (chronic lymphocytic leukemia)     Peripheral arterial disease     Essential hypertension      Treatment Plan  The patient will be admitted to my service here at [facility].   Admit to medical floor remote telemetry  Vitals every 4 hours  Diet renal and n.p.o. after midnight  IV fluids KVO.  Activity as tolerated    PAD arterial occlusion right lower extremity  Heparin drip  ASA/Plavix/Lipitor  Pain control  Vascular surgery consult in place    ESRD   Nephrology consult for HD    HTN  Continue home medications    COPD  Not exacerbated  Continue home medications    DVT prophylaxis patient anticoagulated    Medical Decision Making  Number and Complexity of problems: 4 complex medical problems  Differential Diagnosis: see above    Conditions and Status         Condition is unchanged.     [facility] Data  External documents reviewed: [employer] EHR  Cardiac tracing (EKG, telemetry) interpretation:   Radiology interpretation: see above  Labs reviewed: see above  Any tests that were considered but not ordered: none     Decision rules/scores evaluated (example [id number], [place], etc): N/A     Discussed with: Patient and family at bedside     Care Planning  Shared decision making: Patient  Code status and discussions: Full code    Disposition  Social Determinants of Health that impact treatment or disposition: none  Estimated length of stay is over 2 midnights.     I confirmed that the patient's advanced care plan is present, code status is documented, and a surrogate decision maker is listed in the patient's medical record.     The patient's surrogate decision maker is family, see records.     The patient was seen and examined by me on 6/19/2025 at 1928.    Electronically signed by Donal Rodriguez MD, 06/19/25, 19:28 CDT.

## 2025-06-20 NOTE — PROGRESS NOTES
Adult Nutrition  Assessment/PES    Patient Name:  Ricardo Hugo  YOB: 1950  MRN: [id number]  Admit Date:  6/19/2025    Assessment Date:  6/20/2025    Comments: Pt outside of the room during time of visit. Pt familiar to [facility]NS team; admitted a couple of weeks ago. PMH of severe malnutrition. MSA performed on 6/3/2025. Diagnosis of malnutrition still stands. Per provider notes, c/o lower extremity pain. In previous admission, family reported a 40 lb wt loss since the fall, reflecting a 24% wt loss in 8-9 months. Pt was 143 lb on 5/25/2025 during previous admission; current wt 133 lb, reflecting a 7% wt loss in 3 weeks. Pt on HD 3 times a week due to ESRD. NPO for lower extremity arteriogram today. Likes [employer] chocolate, which pt will continue to receive while inpatient. Will continue to monitor.     Malnutrition Severity Assessment       Row Name 06/20/25 1030          Malnutrition Severity Assessment    Malnutrition Type Chronic Disease - Related Malnutrition        Unintentional Weight Loss     Unintentional Weight Loss Findings Severe     Unintentional Weight Loss  Weight loss greater than 10% in six months  Family reported a 40 lb wt loss since the fall, reflecting a 24% wt loss in 8-9 months. Pt was 143 lb on 5/25/2025 during previous admission; current wt 133 lb, reflecting a 7% wt loss in 3 weeks.        Muscle Loss    Loss of Muscle Mass Findings Severe  Data from NPFE performed on 6/3/25     Sabianist Region Severe - deep hollowing/scooping, lack of muscle to touch, facial bones well defined     Clavicle Bone Region Severe - protruding prominent bone     Acromion Bone Region Severe - squared shoulders, bones, and acromion process protrusion prominent     Scapular Bone Region Severe - prominent bones, depressions easily visible between ribs, scapula, spine, shoulders     Dorsal Hand Region Severe - prominent depression     Patellar Region Severe - prominent bone, square looking, very little  muscle definition     Anterior Thigh Region Severe - line/depression along thigh, obviously thin     Posterior Calf Region Severe - thin with very little definition/firmness        Fat Loss    Subcutaneous Fat Loss Findings Severe  Data from NPFE performed on 6/3/25     Orbital Region  Severe - pronounced hollowness/depression, dark circles, loose saggy skin     Upper Arm Region Severe - mostly skin, very little space between folds, fingers touch     Thoracic & Lumbar Region Severe - ribs visible with prominent depressions, iliac crest very prominent        Criteria Met (Must meet criteria for severity in at least 2 of these categories: M Wasting, Fat Loss, Fluid, Secondary Signs, Wt. Status, Intake)    Patient meets criteria for  Severe Malnutrition            Problem/Interventions:   Problem 1       Row Name 06/20/25 1033          Nutrition Diagnoses Problem 1    Problem 1 Malnutrition  Severe in context of chronic illness     Etiology (related to) Medical Diagnosis;Factors Affecting Nutrition     Cardiac CHF     Pulmonary/Critical Care COPD     Renal ESRD;Hemodialysis     Condition Pain  Lower extremity pain     Reported GI Symptoms Diarrhea     Signs/Symptoms (evidenced by) Unintended Weight Change;Report/Observation;BMI;NPO     BMI 18 - 18.9     Unintended Weight Change Loss     Number of Pounds Lost 10lb     Weight loss time period 3 weeks     Reported/Observed By RD/Tech     Other Comment NFPE findings from 6/4               Intervention Goal       Row Name 06/20/25 1037          Intervention Goal    General Meet nutritional needs for age/condition;Reduce/improve symptoms;Disease management/therapy     PO Initiate feeding;Tolerate PO;Meet estimated needs     Weight Appropriate weight gain               Nutrition Intervention       Row Name 06/20/25 1037          Nutrition Intervention    RD/Tech Action Await begin PO;Follow Tx progress;Care plan reviewd               Education/Evaluation       Row Name  06/20/25 1037          Education    Education No discharge needs identified at this time        Monitor/Evaluation    Monitor Per protocol              Electronically signed by:  Usha Blake RDN, LD  06/20/25 10:41 CDT

## 2025-06-21 LAB
ALBUMIN SERPL-MCNC: 3.1 G/DL (ref 3.5–5.2)
ALBUMIN/GLOB SERPL: 1.3 G/DL
ALP SERPL-CCNC: 132 U/L (ref 39–117)
ALT SERPL W P-5'-P-CCNC: <5 U/L (ref 1–41)
ANION GAP SERPL CALCULATED.3IONS-SCNC: 14 MMOL/L (ref 5–15)
AST SERPL-CCNC: 13 U/L (ref 1–40)
BASOPHILS # BLD AUTO: 0.06 10*3/MM3 (ref 0–0.2)
BASOPHILS NFR BLD AUTO: 1 % (ref 0–1.5)
BILIRUB SERPL-MCNC: 0.4 MG/DL (ref 0–1.2)
BUN SERPL-MCNC: 23.3 MG/DL (ref 8–23)
BUN/CREAT SERPL: 11.3 (ref 7–25)
CALCIUM SPEC-SCNC: 8.2 MG/DL (ref 8.6–10.5)
CHLORIDE SERPL-SCNC: 104 MMOL/L (ref 98–107)
CO2 SERPL-SCNC: 22 MMOL/L (ref 22–29)
CREAT SERPL-MCNC: 2.06 MG/DL (ref 0.76–1.27)
DEPRECATED RDW RBC AUTO: 67.5 FL (ref 37–54)
EGFRCR SERPLBLD CKD-EPI 2021: 32.6 ML/MIN/1.73
EOSINOPHIL # BLD AUTO: 0.17 10*3/MM3 (ref 0–0.4)
EOSINOPHIL NFR BLD AUTO: 2.9 % (ref 0.3–6.2)
ERYTHROCYTE [DISTWIDTH] IN BLOOD BY AUTOMATED COUNT: 18 % (ref 12.3–15.4)
GLOBULIN UR ELPH-MCNC: 2.4 GM/DL
GLUCOSE SERPL-MCNC: 84 MG/DL (ref 65–99)
HCT VFR BLD AUTO: 23 % (ref 37.5–51)
HGB BLD-MCNC: 7.5 G/DL (ref 13–17.7)
IMM GRANULOCYTES # BLD AUTO: 0.05 10*3/MM3 (ref 0–0.05)
IMM GRANULOCYTES NFR BLD AUTO: 0.9 % (ref 0–0.5)
LYMPHOCYTES # BLD AUTO: 0.72 10*3/MM3 (ref 0.7–3.1)
LYMPHOCYTES NFR BLD AUTO: 12.4 % (ref 19.6–45.3)
MCH RBC QN AUTO: 33.2 PG (ref 26.6–33)
MCHC RBC AUTO-ENTMCNC: 32.6 G/DL (ref 31.5–35.7)
MCV RBC AUTO: 101.8 FL (ref 79–97)
MONOCYTES # BLD AUTO: 0.82 10*3/MM3 (ref 0.1–0.9)
MONOCYTES NFR BLD AUTO: 14.2 % (ref 5–12)
NEUTROPHILS NFR BLD AUTO: 3.97 10*3/MM3 (ref 1.7–7)
NEUTROPHILS NFR BLD AUTO: 68.6 % (ref 42.7–76)
NRBC BLD AUTO-RTO: 0 /100 WBC (ref 0–0.2)
PLATELET # BLD AUTO: 100 10*3/MM3 (ref 140–450)
PMV BLD AUTO: 9.4 FL (ref 6–12)
POTASSIUM SERPL-SCNC: 3.6 MMOL/L (ref 3.5–5.2)
PROT SERPL-MCNC: 5.5 G/DL (ref 6–8.5)
RBC # BLD AUTO: 2.26 10*6/MM3 (ref 4.14–5.8)
SODIUM SERPL-SCNC: 140 MMOL/L (ref 136–145)
WBC NRBC COR # BLD AUTO: 5.79 10*3/MM3 (ref 3.4–10.8)

## 2025-06-21 PROCEDURE — 80053 COMPREHEN METABOLIC PANEL: CPT | Performed by: STUDENT IN AN ORGANIZED HEALTH CARE EDUCATION/TRAINING PROGRAM

## 2025-06-21 PROCEDURE — 85025 COMPLETE CBC W/AUTO DIFF WBC: CPT | Performed by: STUDENT IN AN ORGANIZED HEALTH CARE EDUCATION/TRAINING PROGRAM

## 2025-06-21 PROCEDURE — 99024 POSTOP FOLLOW-UP VISIT: CPT | Performed by: NURSE PRACTITIONER

## 2025-06-21 RX ORDER — OXYCODONE AND ACETAMINOPHEN 5; 325 MG/1; MG/1
1 TABLET ORAL EVERY 6 HOURS PRN
Qty: 12 TABLET | Refills: 0 | Status: SHIPPED | OUTPATIENT
Start: 2025-06-21 | End: 2025-06-24

## 2025-06-21 RX ORDER — ACETAMINOPHEN 325 MG/1
650 TABLET ORAL EVERY 4 HOURS PRN
Start: 2025-06-21

## 2025-06-21 RX ORDER — ALPRAZOLAM 0.25 MG
0.25 TABLET ORAL NIGHTLY
Status: ON HOLD | COMMUNITY

## 2025-06-21 RX ORDER — GABAPENTIN 300 MG/1
300 CAPSULE ORAL 2 TIMES DAILY
Status: ON HOLD | COMMUNITY

## 2025-06-21 RX ORDER — POLYETHYLENE GLYCOL 3350 17 G/17G
17 POWDER, FOR SOLUTION ORAL DAILY PRN
Status: ON HOLD | COMMUNITY

## 2025-06-21 RX ORDER — BISACODYL 10 MG
10 SUPPOSITORY, RECTAL RECTAL DAILY PRN
Status: ON HOLD | COMMUNITY

## 2025-06-21 RX ORDER — MONTELUKAST SODIUM 10 MG/1
10 TABLET ORAL NIGHTLY
Status: ON HOLD | COMMUNITY

## 2025-06-21 RX ORDER — GABAPENTIN 300 MG/1
300 CAPSULE ORAL NIGHTLY
Qty: 3 CAPSULE | Refills: 0 | Status: SHIPPED | OUTPATIENT
Start: 2025-06-21 | End: 2025-06-24

## 2025-06-21 RX ORDER — SENNOSIDES 8.6 MG/1
2 TABLET ORAL DAILY PRN
Status: ON HOLD | COMMUNITY

## 2025-06-21 RX ADMIN — DOCUSATE SODIUM 50 MG AND SENNOSIDES 8.6 MG 2 TABLET: 8.6; 5 TABLET, FILM COATED ORAL at 08:49

## 2025-06-21 RX ADMIN — Medication 10 ML: at 19:14

## 2025-06-21 RX ADMIN — NIFEDIPINE 120 MG: 60 TABLET, FILM COATED, EXTENDED RELEASE ORAL at 19:43

## 2025-06-21 RX ADMIN — OXYCODONE AND ACETAMINOPHEN 1 TABLET: 5; 325 TABLET ORAL at 23:40

## 2025-06-21 RX ADMIN — DOCUSATE SODIUM 50 MG AND SENNOSIDES 8.6 MG 2 TABLET: 8.6; 5 TABLET, FILM COATED ORAL at 19:44

## 2025-06-21 RX ADMIN — ASPIRIN 81 MG: 81 TABLET, COATED ORAL at 08:48

## 2025-06-21 RX ADMIN — LEVOTHYROXINE SODIUM 100 MCG: 0.1 TABLET ORAL at 06:05

## 2025-06-21 RX ADMIN — ATORVASTATIN CALCIUM 10 MG: 10 TABLET, FILM COATED ORAL at 19:39

## 2025-06-21 RX ADMIN — TAMSULOSIN HYDROCHLORIDE 0.4 MG: 0.4 CAPSULE ORAL at 19:44

## 2025-06-21 RX ADMIN — OXYCODONE AND ACETAMINOPHEN 1 TABLET: 5; 325 TABLET ORAL at 08:48

## 2025-06-21 RX ADMIN — CLOPIDOGREL BISULFATE 75 MG: 75 TABLET, FILM COATED ORAL at 08:48

## 2025-06-21 RX ADMIN — GABAPENTIN 300 MG: 300 CAPSULE ORAL at 19:40

## 2025-06-21 RX ADMIN — ISOSORBIDE DINITRATE 10 MG: 10 TABLET ORAL at 19:42

## 2025-06-21 RX ADMIN — ISOSORBIDE DINITRATE 10 MG: 10 TABLET ORAL at 08:48

## 2025-06-21 RX ADMIN — SPIRONOLACTONE 25 MG: 25 TABLET ORAL at 08:48

## 2025-06-21 RX ADMIN — MONTELUKAST SODIUM 10 MG: 10 TABLET, COATED ORAL at 19:42

## 2025-06-21 RX ADMIN — HYDRALAZINE HYDROCHLORIDE 100 MG: 50 TABLET ORAL at 19:41

## 2025-06-21 NOTE — PROGRESS NOTES
Nephrology ([facility]) Progress Note      Patient:  Ricardo Hugo  YOB: 1950  Date of Service: 6/21/2025  MRN: [id number]   Acct: [id number]   Primary Care Physician: Nathan Zimmer MD  Advance Directive:   Code Status and Medical Interventions: CPR (Attempt to Resuscitate); Full Support   Ordered at: 06/19/25 1944     Code Status (Patient has no pulse and is not breathing):    CPR (Attempt to Resuscitate)     Medical Interventions (Patient has pulse or is breathing):    Full Support     Admit Date: 6/19/2025       Hospital Day: 2  Referring Provider: No Known Provider      Patient personally seen and examined.  Complete chart including Consults, Notes, Operative Reports, Labs, Cardiology, and Radiology studies reviewed as able.    Chief complaint: Abnormal labs.    Subjective:  Ricardo Hugo is a 75 y.o. male  whom we were consulted for end-stage renal disease on maintenance dialysis.  Patient has permacatheter as a dialysis access.  He also has history of peripheral vascular disease, right carotid endarterectomy, PTA of the right external iliac artery, right SFA.  Presenting to the emergency room complaining of right leg pain.  His pain has progressively getting worse and has decided come to the emergency room.  Patient is currently being evaluated by Dr. Zabala for another arteriogram, look at the status of his blood flow to the right leg.  On June 28 patient underwent intravascular lithotripsy of popliteal artery, posterior tibial artery followed by balloon angioplasties.    This morning he feels much better.  He denies any right lower extremity pain.  But color of his right toes are still blue.      Allergies:  Ondansetron, Zofran [ondansetron hcl], Lortab [hydrocodone-acetaminophen], and Allopurinol    Home Meds:  Medications Prior to Admission   Medication Sig Dispense Refill Last Dose/Taking    ALPRAZolam (XANAX) 0.25 MG tablet Take 1 tablet by mouth Every  Night.   Taking    aspirin (aspirin) 81 MG EC tablet Take 1 tablet by mouth Daily.   Taking    atorvastatin (LIPITOR) 10 MG tablet Take 1 tablet by mouth Daily. 90 tablet 3 Taking    Budeson-Glycopyrrol-Formoterol (Breztri Aerosphere) 160-9-4.8 MCG/ACT aerosol inhaler Inhale 2 puffs 2 (Two) Times a Day.   Taking    calcitriol (ROCALTROL) 0.5 MCG capsule Take 1 capsule by mouth Daily. 90 capsule 2 Taking    carvedilol (COREG) 12.5 MG tablet Take 1 tablet by mouth 2 (Two) Times a Day With Meals.   Taking    clopidogrel (PLAVIX) 75 MG tablet Take 1 tablet by mouth Daily.   Taking    docusate sodium (COLACE) 100 MG capsule Take 1 capsule by mouth 3 (Three) Times a Day As Needed for Constipation.   Taking As Needed    empagliflozin (Jardiance) 10 MG tablet tablet Take 1 tablet by mouth Daily.   Taking    esomeprazole (nexIUM) 20 MG capsule Take 1 capsule by mouth Every Morning Before Breakfast.   Taking    gabapentin (NEURONTIN) 300 MG capsule Take 1 capsule by mouth 2 (Two) Times a Day.   Taking    hydrALAZINE (APRESOLINE) 100 MG tablet Take 1 tablet by mouth 3 (Three) Times a Day.   Taking    iron polysaccharides (NIFEREX) 150 MG capsule Take 1 capsule by mouth Daily.   Taking    isosorbide dinitrate (ISORDIL) 10 MG tablet Take 1 tablet by mouth 3 (Three) Times a Day. 270 tablet 2 Taking    levothyroxine (Synthroid) 100 MCG tablet Take 1 tablet by mouth Every Morning. 90 tablet 2 Taking    magnesium chloride ER 64 MG DR tablet Take 1 tablet by mouth Daily.   Taking    montelukast (SINGULAIR) 10 MG tablet Take 1 tablet by mouth Every Night.   Taking    Multiple Vitamins-Minerals (PRESERVISION/LUTEIN) capsule Take 1 capsule by mouth 2 (two) times a day.   Taking    NIFEdipine XL (ADALAT CC) 60 MG 24 hr tablet Take 2 tablets by mouth Daily.   Taking    spironolactone (ALDACTONE) 25 MG tablet Take 1 tablet by mouth Daily.   Taking    tamsulosin (FLOMAX) 0.4 MG capsule 24 hr capsule Take 1 capsule by mouth Every Night.    Taking    valsartan (DIOVAN) 320 MG tablet Take 1 tablet by mouth Daily.   Taking    vitamin D (ERGOCALCIFEROL) 1.25 MG (22482 UT) capsule capsule Take 1 capsule by mouth 1 (One) Time Per Week. Mondays   Taking    albuterol sulfate  (90 Base) MCG/ACT inhaler Inhale 2 puffs Every 6 (Six) Hours As Needed for Wheezing or Shortness of Air.       bisacodyl (DULCOLAX) 10 MG suppository Insert 1 suppository into the rectum Daily As Needed for Constipation.       desoximetasone (TOPICORT) 0.25 % cream Apply 1 Application topically to the appropriate area as directed 2 (Two) Times a Day As Needed for Irritation. irritation 60 g 0     fluticasone (FLONASE) 50 MCG/ACT nasal spray Administer 2 sprays into the nostril(s) as directed by provider Daily As Needed for Allergies.       naloxone (NARCAN) 4 MG/0.1ML nasal spray Call 911. Don't prime. Joseph in 1 nostril for overdose. Repeat in 2-3 minutes in other nostril if no or minimal breathing/responsiveness. 2 each 0     polyethylene glycol (MIRALAX) 17 g packet Take 17 g by mouth Daily As Needed (constipation).       senna 8.6 MG tablet Take 2 tablets by mouth Daily As Needed for Constipation.       [DISCONTINUED] cloNIDine (CATAPRES) 0.3 MG tablet Take 1 tablet by mouth 3 times a day. (Patient not taking: Reported on 6/21/2025)   Not Taking    [DISCONTINUED] gabapentin (NEURONTIN) 300 MG capsule Take 1 capsule by mouth Every Night. 24 capsule 0     [DISCONTINUED] omeprazole (priLOSEC) 20 MG capsule Take 1 capsule by mouth Daily. (Patient not taking: Reported on 6/21/2025)   Not Taking    [DISCONTINUED] oxyCODONE-acetaminophen (PERCOCET) 5-325 MG per tablet Take 1 tablet by mouth Every 6 (Six) Hours As Needed for Moderate Pain. 24 tablet 0        Medicines:  Current Facility-Administered Medications   Medication Dose Route Frequency Provider Last Rate Last Admin    acetaminophen (TYLENOL) tablet 650 mg  650 mg Oral Q4H PRN Blanye Zabala MD        Or    acetaminophen  (TYLENOL) 160 MG/5ML oral solution 650 mg  650 mg Oral Q4H PRN Blayne Zabala MD        Or    acetaminophen (TYLENOL) suppository 650 mg  650 mg Rectal Q4H PRN Blayne Zabala MD        aspirin EC tablet 81 mg  81 mg Oral Daily Blayne Zabala MD   81 mg at 06/21/25 0848    atorvastatin (LIPITOR) tablet 10 mg  10 mg Oral Nightly Blayne Zabala MD   10 mg at 06/20/25 2030    sennosides-docusate (PERICOLACE) 8.6-50 MG per tablet 2 tablet  2 tablet Oral BID Blayne Zabala MD   2 tablet at 06/21/25 0849    And    polyethylene glycol (MIRALAX) packet 17 g  17 g Oral Daily PRN Blayne Zabala MD        And    bisacodyl (DULCOLAX) EC tablet 5 mg  5 mg Oral Daily PRN Blayne Zabala MD        And    bisacodyl (DULCOLAX) suppository 10 mg  10 mg Rectal Daily PRN Blayne Zabala MD        carvedilol (COREG) tablet 12.5 mg  12.5 mg Oral BID With Meals Blayne Zabala MD   12.5 mg at 06/20/25 1938    clopidogrel (PLAVIX) tablet 75 mg  75 mg Oral Daily Blayne Zabala MD   75 mg at 06/21/25 0848    epoetin cecile-epbx (RETACRIT) injection 10,000 Units  10,000 Units Subcutaneous Once per day on Monday Wednesday Friday Blayne Zabala MD   10,000 Units at 06/20/25 2039    gabapentin (NEURONTIN) capsule 300 mg  300 mg Oral Nightly Blayne Zabala MD   300 mg at 06/20/25 2031    hydrALAZINE (APRESOLINE) tablet 100 mg  100 mg Oral TID Blayne Zabala MD   100 mg at 06/20/25 2030    isosorbide dinitrate (ISORDIL) tablet 10 mg  10 mg Oral TID - Nitrates Blayne Zabala MD   10 mg at 06/21/25 0848    levothyroxine (SYNTHROID, LEVOTHROID) tablet 100 mcg  100 mcg Oral Q AM Blayne Zabala MD   100 mcg at 06/21/25 0605    montelukast (SINGULAIR) tablet 10 mg  10 mg Oral Nightly Blayne Zabala MD   10 mg at 06/20/25 2030    naloxone (NARCAN) injection 0.4 mg  0.4 mg Intravenous Q5 Min PRN Blayne Zabala MD        NIFEdipine XL (PROCARDIA XL) 24 hr tablet 120 mg  120 mg Oral Daily Blayne Zabala MD   120 mg at 06/20/25  1938    nitroglycerin (NITROSTAT) SL tablet 0.4 mg  0.4 mg Sublingual Q5 Min PRN Blayne Zabala MD        oxyCODONE-acetaminophen (PERCOCET) 5-325 MG per tablet 1 tablet  1 tablet Oral Q6H PRN Blayne Zabala MD   1 tablet at 06/21/25 0848    promethazine (PHENERGAN) tablet 12.5 mg  12.5 mg Oral Q6H PRN Donal Rodriguez MD   12.5 mg at 06/20/25 2243    sodium chloride 0.9 % flush 10 mL  10 mL Intravenous Q12H Blayne Zabala MD   10 mL at 06/20/25 2031    sodium chloride 0.9 % flush 10 mL  10 mL Intravenous PRN Blayne Zabala MD        sodium chloride 0.9 % flush 10 mL  10 mL Intravenous Q12H Blayne Zabala MD   10 mL at 06/20/25 2031    sodium chloride 0.9 % flush 10 mL  10 mL Intravenous PRN Blayne Zabala MD        sodium chloride 0.9 % infusion 40 mL  40 mL Intravenous PRN Blayne Zabala MD        sodium chloride 0.9 % infusion 40 mL  40 mL Intravenous PRN Blayne Zabala MD        spironolactone (ALDACTONE) tablet 25 mg  25 mg Oral Daily Blayne Zabala MD   25 mg at 06/21/25 0848    tamsulosin (FLOMAX) 24 hr capsule 0.4 mg  0.4 mg Oral Nightly Blayne Zabala MD   0.4 mg at 06/20/25 2030    valsartan (DIOVAN) tablet 320 mg  320 mg Oral Daily Blayne Zabala MD   320 mg at 06/19/25 2152       Past Medical History:  Past Medical History:   Diagnosis Date    3-vessel CAD 08/11/2020    Allergic rhinitis     Anemia     Anxiety disorder 04/27/2020    Arthritis     Asymmetrical sensorineural hearing loss 06/28/2017    Atherosclerosis of native artery of both lower extremities with intermittent claudication 07/18/2019    Avascular necrosis of femoral head, left 07/11/2020    right hip after surgery    Carotid stenosis     Chronic mucoid otitis media     Chronic rhinitis     COPD (chronic obstructive pulmonary disease)     Coronary artery disease     HEART BYPASS 2004    Crohn's disease of large intestine with other complication 07/30/2020    Chronic diarrhea Colonoscopy July 2020 revealed mild  patchy scattered hemosiderin staining with inflammation more so in rectosigmoid area.  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    Iron deficiency anemia 07/14/2020    Mixed hearing loss of left ear     PAD (peripheral artery disease) 08/11/2020    Perianal abscess     Pernicious anemia 08/17/2020    took shots but never diagnosed with b12 deficiency    Personal history of alcoholism 08/11/2020    quit drinking in 2013    Prostatic hypertrophy 08/11/2020    Sensorineural hearing loss     Sepsis with acute renal failure 09/15/2020    Shortness of breath 05/27/2021    Tinnitus     Ventricular tachycardia, nonsustained 07/14/2020    Weight loss 07/11/2020       Past Surgical History:  Past Surgical History:   Procedure Laterality Date    AORTOGRAM Right 4/25/2025    Procedure: RIGHT LOWER EXTREMITY ANGIOGRAM, SHOCKWAVE LITHOTRIPSY, BALLOON ANGIOPLASTY, MYNX CLOSURE;  Surgeon: Gil Pineda DO;  Location:  [facility];  Service: Vascular;  Laterality: Right;    AORTOGRAM Left 5/22/2025    Procedure: LEFT LOWER EXTREMITY ANGIOGRAM, SHOCKWAVE LITHOTRIPSY, BALLOON ANGIOPLASTY, STENT PLACEMENT, MYNX CLOSURE;  Surgeon: Blayne Zabala MD;  Location:  [facility];  Service: Vascular;  Laterality: Left;    AORTOGRAM Right 5/30/2025    Procedure: AORTOILIAC ANGIOGRAM WITH LEFT LOWER EXTREMITY ANGIOGRAM;  Surgeon: Gil Pineda DO;  Location:  [facility];  Service: Vascular;  Laterality: Right;    ARTERY SURGERY  2021    right side on neck    CAROTID ENDARTERECTOMY Right 05/10/2021    Procedure: RIGHT CAROTID ENDARTERECTOMY WITH EEG;  Surgeon:  Gil Pineda, ;  Location: [facility] HYBRID OR 12;  Service: Vascular;  Laterality: Right;    COLONOSCOPY N/A 07/02/2020    Procedure: COLONOSCOPY WITH ANESTHESIA;  Surgeon: Adrien Brewster MD;  Location: [facility] ENDOSCOPY;  Service: Gastroenterology;  Laterality: N/A;  pre op: diarrhea  post op: polyps  PCP: Joe Velasco MD    COLONOSCOPY N/A 10/13/2020    Procedure: COLONOSCOPY WITH ANESTHESIA;  Surgeon: Adrien Brewster MD;  Location: [facility] ENDOSCOPY;  Service: Gastroenterology;  Laterality: N/A;  Pre: Chronic Diarrhea, Crohn's  Post: AVM  Dr. Neftali Velasco  CO2 Inflation Used    COLONOSCOPY N/A 12/08/2023    Procedure: COLONOSCOPY WITH ANESTHESIA;  Surgeon: Adrien Brewster MD;  Location: [facility] ENDOSCOPY;  Service: Gastroenterology;  Laterality: N/A;  pre chrone's disease  post sub optimal prep, polyp, chrone's      CORONARY ARTERY BYPASS GRAFT  2003    x3    ENDOSCOPY N/A 11/02/2021    Procedure: ESOPHAGOGASTRODUODENOSCOPY WITH ANESTHESIA;  Surgeon: Bridger Bell MD;  Location: [facility] ENDOSCOPY;  Service: Gastroenterology;  Laterality: N/A;  pre anemia;gi bleed  post  gi bleed;schatski ring  Dr. ERIC Velasco    ENDOSCOPY N/A 10/10/2023    Procedure: ESOPHAGOGASTRODUODENOSCOPY WITH ANESTHESIA;  Surgeon: Adrien Brewster MD;  Location: [facility] ENDOSCOPY;  Service: Gastroenterology;  Laterality: N/A;  preop; anemia  postop esophagitis ; R/O barretts   PCP Randall Beata    EYE SURGERY Bilateral     catorac    INCISION AND DRAINAGE PERIRECTAL ABSCESS N/A 03/03/2017    Procedure: INCISION AND DRAINAGE OF JEET ANAL ABSCESS;  Surgeon: Lynette Smith MD;  Location: [facility] OR;  Service:     INGUINAL HERNIA REPAIR Bilateral 06/27/2023    Procedure: INGUINAL HERNIA BILATERAL REPAIR LAPAROSCOPIC WITH DAVINCI ROBOT WITH MESH;  Surgeon: Tahira Rivera MD;  Location: [facility] OR;  Service: Robotics - DaVinci;  Laterality: Bilateral;    INSERTION HEMODIALYSIS CATHETER N/A 4/16/2025     Procedure: HEMODIALYSIS CATHETER PLACEMENT;  Surgeon: Gil Pineda DO;  Location:  [facility];  Service: Vascular;  Laterality: N/A;    MYRINGOTOMY W/ TUBES Left 2017    06/10/2016    TONSILLECTOMY      TOTAL HIP ARTHROPLASTY Right        Family History  Family History   Problem Relation Age of Onset    Breast cancer Mother     Dementia Father     Glaucoma Father     No Known Problems Daughter     Colon polyps Neg Hx     Colon cancer Neg Hx        Social History  Social History     Socioeconomic History    Marital status:    Tobacco Use    Smoking status: Former     Current packs/day: 0.00     Average packs/day: 0.5 packs/day for 25.8 years (12.9 ttl pk-yrs)     Types: Cigarettes     Start date:      Quit date: 10/13/2013     Years since quittin.6     Passive exposure: Past    Smokeless tobacco: Never    Tobacco comments:     quit    Vaping Use    Vaping status: Former    Substances: Nicotine    Devices: Pre-filled or refillable cartridge   Substance and Sexual Activity    Alcohol use: Not Currently    Drug use: No    Sexual activity: Not Currently     Partners: Female       Review of Systems:  History obtained from chart review and the patient  General ROS: No fever or chills  Respiratory ROS: No cough, shortness of breath, wheezing  Cardiovascular ROS: No chest pain or palpitations  Gastrointestinal ROS: No abdominal pain or melena  Genito-Urinary ROS: No dysuria or hematuria  Psych ROS: No anxiety and depression  14 point ROS reviewed with the patient and negative except as noted above and in the HPI unless unable to obtain.    Objective:  Patient Vitals for the past 24 hrs:   BP Temp Temp src Pulse Resp SpO2   25 0626 147/44 99.4 °F (37.4 °C) Oral 66 16 96 %   25 0456 152/41 99.2 °F (37.3 °C) Oral 68 16 98 %   25 2354 149/43 98.6 °F (37 °C) Oral 61 16 98 %   25 2240 94/42 -- -- 59 -- --   25 174/55 -- -- 68 -- --   25 (!)  188/51 97.6 °F (36.4 °C) Oral 72 16 95 %   06/20/25 1916 (!) 190/52 97.9 °F (36.6 °C) Oral 71 16 96 %   06/20/25 1835 178/52 97.8 °F (36.6 °C) Oral 63 16 98 %   06/20/25 1805 -- -- -- 64 -- 97 %   06/20/25 1800 162/59 99.8 °F (37.7 °C) -- 72 15 94 %   06/20/25 1745 131/52 -- -- 64 18 96 %   06/20/25 1730 134/44 -- -- 63 20 100 %   06/20/25 1725 136/46 -- -- 59 21 99 %   06/20/25 1720 146/45 -- -- 61 19 99 %   06/20/25 1719 146/45 100 °F (37.8 °C) Temporal 59 14 100 %   06/20/25 1443 -- -- -- 64 -- 100 %       Intake/Output Summary (Last 24 hours) at 6/21/2025 1301  Last data filed at 6/20/2025 2240  Gross per 24 hour   Intake --   Output 350 ml   Net -350 ml     General: awake/alert   HEENT: Normocephalic atraumatic head  Neck: Supple with no JVD or carotid bruits.  Chest:  clear to auscultation bilaterally without respiratory distress  CVS: regular rate and rhythm  Abdominal: soft, nontender, positive bowel sounds  Extremities: no cyanosis or edema  Skin: warm and dry without rash      Labs:  Results from last 7 days   Lab Units 06/21/25  0620 06/20/25  0307 06/19/25  1506   WBC 10*3/mm3 5.79 5.40 5.65   HEMOGLOBIN g/dL 7.5* 7.9* 8.1*   HEMATOCRIT % 23.0* 24.8* 25.3*   PLATELETS 10*3/mm3 100* 111* 109*         Results from last 7 days   Lab Units 06/21/25  0620 06/20/25  1438 06/20/25  0307 06/19/25  1506   SODIUM mmol/L 140 138 137 135*   POTASSIUM mmol/L 3.6 3.7 4.0 4.0   CHLORIDE mmol/L 104 101 101 98   CO2 mmol/L 22.0 27.0 24.0 27.0   BUN mg/dL 23.3* 11.1 38.9* 32.8*   CREATININE mg/dL 2.06* 1.20 3.03* 2.66*   CALCIUM mg/dL 8.2* 8.6 8.7 8.6   EGFR mL/min/1.73 32.6* 62.3 20.5* 24.0*   BILIRUBIN mg/dL 0.4  --   --  0.3   ALK PHOS U/L 132*  --   --  180*   ALT (SGPT) U/L <5  --   --  10   AST (SGOT) U/L 13  --   --  17   GLUCOSE mg/dL 84 84 102* 107*       Radiology:   Imaging Results (Last 72 Hours)       Procedure Component Value Units Date/Time    IR Angiogram Extremity - In process [[id number]] Resulted:  06/20/25 1525     Updated: 06/20/25 1710    This result has not been signed. Information might be incomplete.      FL C Arm During Surgery [[id number]] Resulted: 06/20/25 1710     Updated: 06/20/25 1710    Narrative:      This procedure was auto-finalized with no dictation required.    US Arterial Doppler Lower Extremity Right [[id number]] Collected: 06/19/25 1819     Updated: 06/19/25 1824    Narrative:      History: [facility]     Comments: Grayscale imaging as well as color flow duplex were used to  evaluate the right lower extremity arterial system     On the right, the peak systolic velocity in the common femoral artery  measured 69 cm/s. In the profunda femoris artery measured 47.6 cm/s. In  the proximal SFA measured 69 cm/s. The mid SFA measured 56.3 cm/s. In  the distal SFA measured 57.9 cm/s. In the popliteal artery measured 31.2  cm/s. In the posterior tibial artery measured 34.9 cm/s. In the anterior  tibial artery measured 22 cm/s.          Impression:      No hemodynamically significant stenosis identified in the  right lower extremity. There is near continuous flow in the tibial  vessels which indicates a significant stenosis versus occlusion of the  more proximal arteries. There is monophasic flow in the right common  femoral artery which may indicate more proximal stenosis.     This report was signed and finalized on 6/19/2025 6:21 PM by Blayne Zabala.       XR Chest 1 View [[id number]] Collected: 06/19/25 1546     Updated: 06/19/25 1552    Narrative:      EXAM: XR CHEST 1 VW-         HISTORY: work up       COMPARISON: 5/29/2025.     TECHNIQUE:  Frontal and lateral views of the chest submitted.     FINDINGS:    There is increasing opacity over the right hemithorax may represent  increasing layering right pleural effusion. Underlying pneumonia not  excluded. There is probable volume overload. A right IJ tunneled central  venous dialysis catheter tip is at the SVC. The patient is status post  median sternotomy  "and CABG. There is calcification of the thoracic  aorta. No left effusion is seen and no pneumothorax identified. There is  sclerosis at the right humeral head may represent avascular necrosis.          Impression:      1. Increasing diffuse opacity of the right hemithorax may represent  increasing layering right pleural effusion and probable volume overload.     This report was signed and finalized on 6/19/2025 3:48 PM by Eleuterio Rivera.               Culture:  No results found for: \"BLOODCX\", \"URINECX\", \"WOUNDCX\", \"MRSACX\", \"RESPCX\", \"STOOLCX\"      Assessment   1.  End-stage renal disease on maintenance dialysis.  2.  Hypertensive nephrosclerosis.  3.  Severe peripheral vascular disease, status post revascularization include lithotripsy as well as angioplasty of popliteal and posterior tibial artery of right leg.  4.  Anemia of chronic kidney disease.  5.  History of carotid occlusive disease    Plan:  1.  Next hemodialysis treatment on Tuesday.  2.  Continue ABILIO.  3.  Plan was discussed with the patient      Giuliano Saldana MD  6/21/2025  13:01 CDT    "

## 2025-06-21 NOTE — PLAN OF CARE
Goal Outcome Evaluation: patient medicated once for complaints of pain this shift. Good relief. Patients left groin has been oozing blood most of this shift. Dressing to groin has been changed multiple times this shift. Safe guard  and pressure dressing has been replaced to groin wound. Sand bag has also been applied. Incision still oozing. Admitting provider as well as vascular surgery has been made aware of groin oozing. Patient safety to be maintained this shift, continue to monitor and report abnormal to provider.

## 2025-06-21 NOTE — PLAN OF CARE
Goal Outcome Evaluation:  Plan of Care Reviewed With: patient      Progress: improving     Patient A&O x4 w/ intermittent confusion noted. C/o pain this shift; see MAR. IVF per order. Neuro's Q4 intact. Safegaurd device taken off of left groin site d/t saturation. Gauze and tegaderm applied; ice pack to site. Pulses dopplered. Rm air. NSR per tele. VSS. Safety maintained.

## 2025-06-21 NOTE — PROGRESS NOTES
[facility] Medicine Services  INPATIENT PROGRESS NOTE    Patient Name: Ricardo Hugo  Date of Admission: 6/19/2025  Today's Date: 06/21/25  Length of Stay: 2  Primary Care Physician: Nathan Zimmer MD    Subjective   Chief Complaint: f/u PAD/occlusion    HPI   Patient seen and evaluated earlier today.  Was straining to have a BM.  Has chronic constipation issues.  He was successful in having a BM.  However even with sandbag in place his left catheter access site from yesterday is still been oozing today.  He was in for discharge based on review of recommendations earlier today by vascular surgery but given ongoing oozing we will hold off on discharge and thank you to monitor.  He otherwise feels okay.  Denies chest pain or shortness of breath.  No nausea.  No dizziness.  Tolerating p.o.      Review of Systems   All pertinent negatives and positives are as above. All other systems have been reviewed and are negative unless otherwise stated.     Objective    Temp:  [97.6 °F (36.4 °C)-100 °F (37.8 °C)] 99.4 °F (37.4 °C)  Heart Rate:  [59-72] 66  Resp:  [14-21] 16  BP: ()/(41-59) 147/44  Physical Exam  GEN: Awake, alert, interactive, in NAD, appears thin/frail  HEENT: PERRLA, EOMI, Anicteric, Trachea midline  Lungs: no wheezing/rales/rhonchi  Heart: RRR, +S1/s2, no rub  ABD: soft, nt/nd, +BS, no guarding/rebound  Extremities: slight discoloration of right 2nd and 3rd toes.  No ulcerations or wounds.    Neuro: AAOx3, no focal deficits  Psych: normal mood & affect        Results Review:  I have reviewed the labs, radiology results, and diagnostic studies.    Laboratory Data:   Results from last 7 days   Lab Units 06/21/25  0620 06/20/25  0307 06/19/25  1506   WBC 10*3/mm3 5.79 5.40 5.65   HEMOGLOBIN g/dL 7.5* 7.9* 8.1*   HEMATOCRIT % 23.0* 24.8* 25.3*   PLATELETS 10*3/mm3 100* 111* 109*        Results from last 7 days   Lab Units 06/21/25  0620 06/20/25  5463  "06/20/25  0307 06/19/25  1506   SODIUM mmol/L 140 138 137 135*   POTASSIUM mmol/L 3.6 3.7 4.0 4.0   CHLORIDE mmol/L 104 101 101 98   CO2 mmol/L 22.0 27.0 24.0 27.0   BUN mg/dL 23.3* 11.1 38.9* 32.8*   CREATININE mg/dL 2.06* 1.20 3.03* 2.66*   CALCIUM mg/dL 8.2* 8.6 8.7 8.6   BILIRUBIN mg/dL 0.4  --   --  0.3   ALK PHOS U/L 132*  --   --  180*   ALT (SGPT) U/L <5  --   --  10   AST (SGOT) U/L 13  --   --  17   GLUCOSE mg/dL 84 84 102* 107*       Culture Data:   No results found for: \"BLOODCX\", \"URINECX\", \"WOUNDCX\", \"MRSACX\", \"RESPCX\", \"STOOLCX\"    Radiology Data:   Imaging Results (Last 24 Hours)       Procedure Component Value Units Date/Time    IR Angiogram Extremity - In process [[id number]] Resulted: 06/20/25 1525     Updated: 06/20/25 1710    This result has not been signed. Information might be incomplete.      FL C Arm During Surgery [[id number]] Resulted: 06/20/25 1710     Updated: 06/20/25 1710    Narrative:      This procedure was auto-finalized with no dictation required.            I have reviewed the patient's current medications.     Assessment/Plan   Assessment  Active Hospital Problems    Diagnosis     **Vascular occlusion     ESRD (end stage renal disease) on dialysis     Chronic diastolic (congestive) heart failure     CLL (chronic lymphocytic leukemia)     Peripheral arterial disease     Essential hypertension        Treatment Plan  #1 vascular occlusion -with intervention yesterday on right popliteal artery and peroneal artery.  Also had PTA to the right posterior tibial artery.  Was on heparin drip preprocedure.  Now off and on aspirin, Plavix, statin.  Still having some oozing from his left Paul access site.  Continue to hold pressure.    #2 ESRD -postdialysis yesterday.  Nephrology following.  Continue as per schedule.  No immediate dialysis needs.    #3 chronic diastolic CHF -appears euvolemic.  No signs of exacerbation.    #4 hypertension -Home BP meds resumed.  Monitor.  He is on extensive " regimen of nifedipine, Isordil, hydralazine,, Coreg, Aldactone.    #5 chronic constipation -continue bowel regimen.  Positive BM today.    Medical Decision Making  Number and Complexity of problems: 1 acute, multiple chronic  Differential Diagnosis: As above    Conditions and Status        Improved from arrival, status post revascularization to right lower extremity.  Still having some oozing from access site.  Monitor.     [facility] Data  External documents reviewed: None  Cardiac tracing (EKG, telemetry) interpretation: None  Radiology interpretation: Reports reviewed  Labs reviewed: As above  Any tests that were considered but not ordered: None     Decision rules/scores evaluated (example [id number], Wells, etc): None     Discussed with: Patient, nursing     Care Planning  Shared decision making: Patient apprised of current labs, vitals, imaging and treatment plan.  They are agreeable with proceeding with plans as discussed.    Code status and discussions: full code    Disposition  Social Determinants of Health that impact treatment or disposition: none  Initially was going to discharge back to [facility] today but will hold and monitor overnight given ongoing oozing.  Recheck labs in the morning.  Potential discharge tomorrow if bed still available and oozing stops.        Electronically signed by Dc Issa DO, 06/21/25, 14:09 CDT.

## 2025-06-21 NOTE — NURSING NOTE
Received from PACU. VSS. RA. IVF as ordered. Flat bedrest until 2210. Pulses per doppler. Medicated for pain. 10cc air removed from safe-guard at 1915 as ordered. Left groin site soft.

## 2025-06-21 NOTE — CASE MANAGEMENT/SOCIAL WORK
Continued Stay Note  NANDINI Oneill     Patient Name: Ricardo Hugo  MRN: [id number]  Today's Date: 6/21/2025    Admit Date: 6/19/2025    Plan: [facility]   Discharge Plan       Row Name 06/21/25 1101       Plan    Plan [facility]    Plan Comments SW spoke with a nurse from  and confirmed there was a open bed for pt. D/C summary needs to be faxed to [phone], report can called to [phone].    Final Discharge Disposition Code 03 - skilled nursing facility (SNF)                   Discharge Codes    No documentation.                 Expected Discharge Date and Time       Expected Discharge Date Expected Discharge Time    Jun 21, 2025               Jules Guerrero

## 2025-06-21 NOTE — PROGRESS NOTES
LOS: 2 days   Patient Care Team:  Nathan Zimmer MD as PCP - General (Internal Medicine)  Adrien Brewster MD as Consulting Physician (Gastroenterology)  Eleuterio Farley MD (Inactive) as Cardiologist (Cardiology)  Elena Jon APRN as Referring Physician (Vascular Surgery)  Bolivar Rueda MD as Consulting Physician (Nephrology)  Slava Tate APRN as Nurse Practitioner (Family Medicine)  New Omalley MD as Consulting Physician (Pulmonary Disease)  Becky Camacho APRN as Nurse Practitioner (Hematology and Oncology)  Gil Pineda DO as Consulting Physician (Vascular Surgery)  Malu Lozano, RN as Ambulatory  ([facility])    Chief Complaint: POD #1 right lower extremity arteriogram, with shockwave lithotripsy, balloon angioplasty    Subjective     Patient Complaints: Patient examined/seen lying in bed.  Patient was resting comfortably, but quite groggy.  Bilateral feet are warm.  Left groin incision with drainage, sandbag in place.    Objective     Vital Signs  Temp:  [97.6 °F (36.4 °C)-100 °F (37.8 °C)] 99.4 °F (37.4 °C)  Heart Rate:  [59-72] 66  Resp:  [14-21] 16  BP: ()/(41-59) 147/44    Physical Exam  Vitals and nursing note reviewed.   Constitutional:       General: He is not in acute distress.     Appearance: Normal appearance. He is not diaphoretic.   HENT:      Head: Normocephalic. No right periorbital erythema or left periorbital erythema.      Nose: Nose normal.   Eyes:      General: No scleral icterus.     Pupils: Pupils are equal.   Cardiovascular:      Rate and Rhythm: Normal rate and regular rhythm.      Pulses:           Dorsalis pedis pulses are 0 on the right side and detected w/ Doppler on the left side.        Posterior tibial pulses are detected w/ Doppler on the right side and detected w/ Doppler on the left side.      Heart sounds: No murmur heard.     Comments: Bilateral peroneal signals dopplered  Pulmonary:       Effort: Pulmonary effort is normal. No respiratory distress.   Abdominal:      General: There is no distension.      Palpations: Abdomen is soft.      Tenderness: There is no abdominal tenderness. There is no guarding.   Musculoskeletal:         General: No swelling or tenderness. Normal range of motion.      Cervical back: Normal range of motion and neck supple.      Right lower leg: No edema.      Left lower leg: No edema.   Feet:      Right foot:      Skin integrity: Skin integrity normal.      Left foot:      Skin integrity: Skin integrity normal.   Skin:     General: Skin is warm and dry.      Findings: No erythema or rash.   Neurological:      General: No focal deficit present.      Mental Status: He is alert and oriented to person, place, and time. Mental status is at baseline.      Cranial Nerves: No cranial nerve deficit.      Gait: Gait normal.   Psychiatric:         Attention and Perception: Attention normal.         Mood and Affect: Mood normal.         Behavior: Behavior normal.         Thought Content: Thought content normal.         Judgment: Judgment normal.         Laboratory Data:   Results from last 7 days   Lab Units 06/21/25  0620 06/20/25  0307 06/19/25  1506   WBC 10*3/mm3 5.79 5.40 5.65   HEMOGLOBIN g/dL 7.5* 7.9* 8.1*   HEMATOCRIT % 23.0* 24.8* 25.3*   PLATELETS 10*3/mm3 100* 111* 109*       Results from last 7 days   Lab Units 06/21/25  0620 06/20/25  1438 06/20/25  0307 06/19/25  1506   SODIUM mmol/L 140 138 137 135*   POTASSIUM mmol/L 3.6 3.7 4.0 4.0   CHLORIDE mmol/L 104 101 101 98   CO2 mmol/L 22.0 27.0 24.0 27.0   BUN mg/dL 23.3* 11.1 38.9* 32.8*   CREATININE mg/dL 2.06* 1.20 3.03* 2.66*   CALCIUM mg/dL 8.2* 8.6 8.7 8.6   BILIRUBIN mg/dL 0.4  --   --  0.3   ALK PHOS U/L 132*  --   --  180*   ALT (SGPT) U/L <5  --   --  10   AST (SGOT) U/L 13  --   --  17   GLUCOSE mg/dL 84 84 102* 107*     Results from last 7 days   Lab Units 06/19/25  1506   PROTIME Seconds 15.7*   INR  1.19*    APTT seconds 33.3            Medication Review: Reviewed    Assessment & Plan       Vascular occlusion    Essential hypertension    Peripheral arterial disease    CLL (chronic lymphocytic leukemia)    Chronic diastolic (congestive) heart failure    ESRD (end stage renal disease) on dialysis          Plan for disposition:  Follow-up with vascular in 2 weeks with vein mapping study of his bilateral upper extremities  DC Dilaudid  Continue oral pain medication  Dialysis per nephrology recommendations  Okay to DC when okay with hospitalist    STERLING Sena  Vascular Surgery  [phone]  06/21/25  07:56 CDT

## 2025-06-22 LAB
ALBUMIN SERPL-MCNC: 3.4 G/DL (ref 3.5–5.2)
ALBUMIN/GLOB SERPL: 1.2 G/DL
ALP SERPL-CCNC: 174 U/L (ref 39–117)
ALT SERPL W P-5'-P-CCNC: <5 U/L (ref 1–41)
ANION GAP SERPL CALCULATED.3IONS-SCNC: 12 MMOL/L (ref 5–15)
AST SERPL-CCNC: 16 U/L (ref 1–40)
BASOPHILS # BLD AUTO: 0.09 10*3/MM3 (ref 0–0.2)
BASOPHILS NFR BLD AUTO: 1.1 % (ref 0–1.5)
BILIRUB SERPL-MCNC: 0.4 MG/DL (ref 0–1.2)
BUN SERPL-MCNC: 37.5 MG/DL (ref 8–23)
BUN/CREAT SERPL: 11.8 (ref 7–25)
CALCIUM SPEC-SCNC: 9.2 MG/DL (ref 8.6–10.5)
CHLORIDE SERPL-SCNC: 102 MMOL/L (ref 98–107)
CO2 SERPL-SCNC: 22 MMOL/L (ref 22–29)
CREAT SERPL-MCNC: 3.17 MG/DL (ref 0.76–1.27)
DEPRECATED RDW RBC AUTO: 66.3 FL (ref 37–54)
EGFRCR SERPLBLD CKD-EPI 2021: 19.4 ML/MIN/1.73
EOSINOPHIL # BLD AUTO: 0.19 10*3/MM3 (ref 0–0.4)
EOSINOPHIL NFR BLD AUTO: 2.4 % (ref 0.3–6.2)
ERYTHROCYTE [DISTWIDTH] IN BLOOD BY AUTOMATED COUNT: 17.8 % (ref 12.3–15.4)
GLOBULIN UR ELPH-MCNC: 2.9 GM/DL
GLUCOSE SERPL-MCNC: 107 MG/DL (ref 65–99)
HCT VFR BLD AUTO: 26.8 % (ref 37.5–51)
HGB BLD-MCNC: 8.7 G/DL (ref 13–17.7)
IMM GRANULOCYTES # BLD AUTO: 0.1 10*3/MM3 (ref 0–0.05)
IMM GRANULOCYTES NFR BLD AUTO: 1.2 % (ref 0–0.5)
LYMPHOCYTES # BLD AUTO: 1.05 10*3/MM3 (ref 0.7–3.1)
LYMPHOCYTES NFR BLD AUTO: 13.1 % (ref 19.6–45.3)
MCH RBC QN AUTO: 32.7 PG (ref 26.6–33)
MCHC RBC AUTO-ENTMCNC: 32.5 G/DL (ref 31.5–35.7)
MCV RBC AUTO: 100.8 FL (ref 79–97)
MONOCYTES # BLD AUTO: 0.95 10*3/MM3 (ref 0.1–0.9)
MONOCYTES NFR BLD AUTO: 11.8 % (ref 5–12)
NEUTROPHILS NFR BLD AUTO: 5.64 10*3/MM3 (ref 1.7–7)
NEUTROPHILS NFR BLD AUTO: 70.4 % (ref 42.7–76)
NRBC BLD AUTO-RTO: 0 /100 WBC (ref 0–0.2)
PLATELET # BLD AUTO: 119 10*3/MM3 (ref 140–450)
PMV BLD AUTO: 9.3 FL (ref 6–12)
POTASSIUM SERPL-SCNC: 3.7 MMOL/L (ref 3.5–5.2)
PROT SERPL-MCNC: 6.3 G/DL (ref 6–8.5)
RBC # BLD AUTO: 2.66 10*6/MM3 (ref 4.14–5.8)
SODIUM SERPL-SCNC: 136 MMOL/L (ref 136–145)
WBC NRBC COR # BLD AUTO: 8.02 10*3/MM3 (ref 3.4–10.8)

## 2025-06-22 PROCEDURE — 85025 COMPLETE CBC W/AUTO DIFF WBC: CPT | Performed by: INTERNAL MEDICINE

## 2025-06-22 PROCEDURE — 80053 COMPREHEN METABOLIC PANEL: CPT | Performed by: INTERNAL MEDICINE

## 2025-06-22 RX ORDER — OXYCODONE AND ACETAMINOPHEN 10; 325 MG/1; MG/1
1 TABLET ORAL ONCE AS NEEDED
Refills: 0 | Status: COMPLETED | OUTPATIENT
Start: 2025-06-22 | End: 2025-06-23

## 2025-06-22 RX ADMIN — CLOPIDOGREL BISULFATE 75 MG: 75 TABLET, FILM COATED ORAL at 09:05

## 2025-06-22 RX ADMIN — NIFEDIPINE 120 MG: 60 TABLET, FILM COATED, EXTENDED RELEASE ORAL at 17:35

## 2025-06-22 RX ADMIN — OXYCODONE AND ACETAMINOPHEN 1 TABLET: 5; 325 TABLET ORAL at 12:00

## 2025-06-22 RX ADMIN — MONTELUKAST SODIUM 10 MG: 10 TABLET, COATED ORAL at 20:55

## 2025-06-22 RX ADMIN — LEVOTHYROXINE SODIUM 100 MCG: 0.1 TABLET ORAL at 06:43

## 2025-06-22 RX ADMIN — CARVEDILOL 12.5 MG: 6.25 TABLET, FILM COATED ORAL at 17:35

## 2025-06-22 RX ADMIN — OXYCODONE AND ACETAMINOPHEN 1 TABLET: 5; 325 TABLET ORAL at 05:19

## 2025-06-22 RX ADMIN — DOCUSATE SODIUM 50 MG AND SENNOSIDES 8.6 MG 2 TABLET: 8.6; 5 TABLET, FILM COATED ORAL at 09:05

## 2025-06-22 RX ADMIN — ATORVASTATIN CALCIUM 10 MG: 10 TABLET, FILM COATED ORAL at 20:56

## 2025-06-22 RX ADMIN — OXYCODONE AND ACETAMINOPHEN 1 TABLET: 5; 325 TABLET ORAL at 18:25

## 2025-06-22 RX ADMIN — Medication 10 ML: at 21:00

## 2025-06-22 RX ADMIN — TAMSULOSIN HYDROCHLORIDE 0.4 MG: 0.4 CAPSULE ORAL at 20:56

## 2025-06-22 RX ADMIN — ISOSORBIDE DINITRATE 10 MG: 10 TABLET ORAL at 09:05

## 2025-06-22 RX ADMIN — GABAPENTIN 300 MG: 300 CAPSULE ORAL at 20:55

## 2025-06-22 RX ADMIN — ACETAMINOPHEN 650 MG: 325 TABLET, FILM COATED ORAL at 21:00

## 2025-06-22 RX ADMIN — DOCUSATE SODIUM 50 MG AND SENNOSIDES 8.6 MG 2 TABLET: 8.6; 5 TABLET, FILM COATED ORAL at 20:56

## 2025-06-22 RX ADMIN — ISOSORBIDE DINITRATE 10 MG: 10 TABLET ORAL at 17:35

## 2025-06-22 RX ADMIN — VALSARTAN 320 MG: 80 TABLET, FILM COATED ORAL at 09:05

## 2025-06-22 RX ADMIN — ASPIRIN 81 MG: 81 TABLET, COATED ORAL at 09:05

## 2025-06-22 RX ADMIN — Medication 10 ML: at 09:06

## 2025-06-22 RX ADMIN — CARVEDILOL 12.5 MG: 6.25 TABLET, FILM COATED ORAL at 09:05

## 2025-06-22 RX ADMIN — ISOSORBIDE DINITRATE 10 MG: 10 TABLET ORAL at 12:00

## 2025-06-22 RX ADMIN — SPIRONOLACTONE 25 MG: 25 TABLET ORAL at 09:05

## 2025-06-22 RX ADMIN — ACETAMINOPHEN 650 MG: 325 TABLET, FILM COATED ORAL at 00:40

## 2025-06-22 NOTE — PROGRESS NOTES
[facility] Medicine Services  INPATIENT PROGRESS NOTE    Patient Name: Ricardo Hugo  Date of Admission: 6/19/2025  Today's Date: 06/22/25  Length of Stay: 3  Primary Care Physician: Nathan Zimmer MD    Subjective   Chief Complaint: f/u PAD/occlusion    HPI   Patient seen with family at bedside.  He is feeling better today after having a BM yesterday.  No nausea.  Tolerating food.  He is still having pain in his right foot.  Not worse than it was on arrival but he still feels it.  2nd and 3rd toes are purple.  Still oozing from left femoral site.  Vascular aware.      Review of Systems   All pertinent negatives and positives are as above. All other systems have been reviewed and are negative unless otherwise stated.     Objective    Temp:  [98.4 °F (36.9 °C)-99 °F (37.2 °C)] 98.4 °F (36.9 °C)  Heart Rate:  [63-79] 66  Resp:  [14-18] 18  BP: (140-184)/(45-58) 154/50  Physical Exam  GEN: Awake, alert, interactive, in NAD, appears thin/frail  HEENT: PERRLA, EOMI, Anicteric, Trachea midline  Lungs: no wheezing/rales/rhonchi  Heart: RRR, +S1/s2, no rub  ABD: soft, nt/nd, +BS, no guarding/rebound  Extremities: Ongoing discoloration of right 2nd and 3rd toes, third is deep purple today, no ulceration or drainage.  Neuro: AAOx3, no focal deficits  Psych: normal mood & affect        Results Review:  I have reviewed the labs, radiology results, and diagnostic studies.    Laboratory Data:   Results from last 7 days   Lab Units 06/22/25  0510 06/21/25  0620 06/20/25  0307   WBC 10*3/mm3 8.02 5.79 5.40   HEMOGLOBIN g/dL 8.7* 7.5* 7.9*   HEMATOCRIT % 26.8* 23.0* 24.8*   PLATELETS 10*3/mm3 119* 100* 111*        Results from last 7 days   Lab Units 06/22/25  0510 06/21/25  0620 06/20/25  1438 06/20/25  0307 06/19/25  1506   SODIUM mmol/L 136 140 138   < > 135*   POTASSIUM mmol/L 3.7 3.6 3.7   < > 4.0   CHLORIDE mmol/L 102 104 101   < > 98   CO2 mmol/L 22.0 22.0 27.0   < > 27.0   BUN  "mg/dL 37.5* 23.3* 11.1   < > 32.8*   CREATININE mg/dL 3.17* 2.06* 1.20   < > 2.66*   CALCIUM mg/dL 9.2 8.2* 8.6   < > 8.6   BILIRUBIN mg/dL 0.4 0.4  --   --  0.3   ALK PHOS U/L 174* 132*  --   --  180*   ALT (SGPT) U/L <5 <5  --   --  10   AST (SGOT) U/L 16 13  --   --  17   GLUCOSE mg/dL 107* 84 84   < > 107*    < > = values in this interval not displayed.       Culture Data:   No results found for: \"BLOODCX\", \"URINECX\", \"WOUNDCX\", \"MRSACX\", \"RESPCX\", \"STOOLCX\"    Radiology Data:   Imaging Results (Last 24 Hours)       ** No results found for the last 24 hours. **            I have reviewed the patient's current medications.     Assessment/Plan   Assessment  Active Hospital Problems    Diagnosis     **Vascular occlusion     ESRD (end stage renal disease) on dialysis     Chronic diastolic (congestive) heart failure     CLL (chronic lymphocytic leukemia)     Peripheral arterial disease     Essential hypertension        Treatment Plan  #1 vascular occlusion -with intervention on 6/22 right popliteal artery and peroneal artery.  Also had PTA to the right posterior tibial artery.  Was on heparin drip preprocedure.  Now off and on aspirin, Plavix, statin.  Still having some oozing from his left femoral access site.  Having worsening discoloration of his right 2nd and 3rd toes despite dopplerable pulse.  May still be having some demarcating gangrene/tissue death after recent vascular event.  Vascular surgery is aware.    #2 ESRD -postdialysis yesterday.  Nephrology following.  Continue as per schedule.  No immediate dialysis needs.    #3 chronic diastolic CHF -appears euvolemic.  No signs of exacerbation.    #4 hypertension -Home BP meds resumed.  Monitor.  He is on extensive regimen of nifedipine, Isordil, hydralazine, Coreg, Aldactone.  Going to hold hydralazine today and monitor.    #5 chronic constipation -continue bowel regimen.  Positive BM    Medical Decision Making  Number and Complexity of problems: 1 acute, " multiple chronic  Differential Diagnosis: As above    Conditions and Status        Improved from arrival not yet at goal, status post revascularization to right lower extremity.  Still having some oozing from access site as well as discoloration of right foot with pain..  Monitor.     [facility] Data  External documents reviewed: None  Cardiac tracing (EKG, telemetry) interpretation: None  Radiology interpretation: Reports reviewed  Labs reviewed: As above  Any tests that were considered but not ordered: None     Decision rules/scores evaluated (example [id number], Wells, etc): None     Discussed with: Patient, nursing     Care Planning  Shared decision making: Patient apprised of current labs, vitals, imaging and treatment plan.  They are agreeable with proceeding with plans as discussed.    Code status and discussions: full code    Disposition  Social Determinants of Health that impact treatment or disposition: none  Has bed at [facility] but continuing to monitor here given right foot discoloration and ongoing oozing of left femoral catheter site.  Potentially back to [facility] in the next day or 2 based on ongoing clinical course and vascular recommendations.        Electronically signed by Dc Issa DO, 06/22/25, 11:23 CDT.

## 2025-06-22 NOTE — ANESTHESIA POSTPROCEDURE EVALUATION
"Patient: Ricardo Hugo    Procedure Summary       Date: 06/20/25 Room / Location: [facility] OR  /  [facility] HYBRID OR    Anesthesia Start: 1515 Anesthesia Stop: 1718    Procedure: right lower extremity arteriogram, shockwave lithotripsy, balloon angioplasty, mynx closue (Right: Groin) Diagnosis:       Vascular occlusion      (Vascular occlusion [I99.8])    Surgeons: Blayne Zabala MD Provider: Madeleine Selby CRNA    Anesthesia Type: MAC ASA Status: 4            Anesthesia Type: MAC    Vitals  Vitals Value Taken Time   /59 06/20/25 18:01   Temp 99.8 °F (37.7 °C) 06/20/25 18:00   Pulse 64 06/20/25 18:05   Resp 15 06/20/25 18:00   SpO2 97 % 06/20/25 18:05           Post Anesthesia Care and Evaluation    Patient location during evaluation: PACU  Patient participation: complete - patient participated  Level of consciousness: awake and alert  Pain management: adequate    Airway patency: patent  Anesthetic complications: No anesthetic complications    Cardiovascular status: acceptable  Respiratory status: acceptable  Hydration status: acceptable    Comments: Blood pressure 154/50, pulse 66, temperature 98.4 °F (36.9 °C), temperature source Oral, resp. rate 18, height 182.9 cm (72\"), weight 60.5 kg (133 lb 4.8 oz), SpO2 98%.    Pt discharged from PACU based on jennifer score >8    "

## 2025-06-22 NOTE — PROGRESS NOTES
Nephrology ([facility]) Progress Note      Patient:  Ricardo Hugo  YOB: 1950  Date of Service: 6/22/2025  MRN: [id number]   Acct: [id number]   Primary Care Physician: Nathan Zimmer MD  Advance Directive:   Code Status and Medical Interventions: CPR (Attempt to Resuscitate); Full Support   Ordered at: 06/19/25 1944     Code Status (Patient has no pulse and is not breathing):    CPR (Attempt to Resuscitate)     Medical Interventions (Patient has pulse or is breathing):    Full Support     Admit Date: 6/19/2025       Hospital Day: 3  Referring Provider: No Known Provider      Patient personally seen and examined.  Complete chart including Consults, Notes, Operative Reports, Labs, Cardiology, and Radiology studies reviewed as able.    Chief complaint: Abnormal labs.    Subjective:  Ricardo Hugo is a 75 y.o. male  whom we were consulted for end-stage renal disease on maintenance dialysis.  Patient has permacatheter as a dialysis access.  He also has history of peripheral vascular disease, right carotid endarterectomy, PTA of the right external iliac artery, right SFA.  Presenting to the emergency room complaining of right leg pain.  His pain has progressively getting worse and has decided come to the emergency room.  Patient is currently being evaluated by Dr. Zabala for another arteriogram, look at the status of his blood flow to the right leg.  On June 28 patient underwent intravascular lithotripsy of popliteal artery, posterior tibial artery followed by balloon angioplasties.    This morning patient feels well.  He denies any shortness of breath.  Patient also denies any right leg pain.  He is scheduled to have hemodialysis treatment tomorrow.      Allergies:  Ondansetron, Zofran [ondansetron hcl], Lortab [hydrocodone-acetaminophen], and Allopurinol    Home Meds:  Medications Prior to Admission   Medication Sig Dispense Refill Last Dose/Taking    ALPRAZolam (XANAX)  0.25 MG tablet Take 1 tablet by mouth Every Night.   Taking    aspirin (aspirin) 81 MG EC tablet Take 1 tablet by mouth Daily.   Taking    atorvastatin (LIPITOR) 10 MG tablet Take 1 tablet by mouth Daily. 90 tablet 3 Taking    Budeson-Glycopyrrol-Formoterol (Breztri Aerosphere) 160-9-4.8 MCG/ACT aerosol inhaler Inhale 2 puffs 2 (Two) Times a Day.   Taking    calcitriol (ROCALTROL) 0.5 MCG capsule Take 1 capsule by mouth Daily. 90 capsule 2 Taking    carvedilol (COREG) 12.5 MG tablet Take 1 tablet by mouth 2 (Two) Times a Day With Meals.   Taking    clopidogrel (PLAVIX) 75 MG tablet Take 1 tablet by mouth Daily.   Taking    docusate sodium (COLACE) 100 MG capsule Take 1 capsule by mouth 3 (Three) Times a Day As Needed for Constipation.   Taking As Needed    empagliflozin (Jardiance) 10 MG tablet tablet Take 1 tablet by mouth Daily.   Taking    esomeprazole (nexIUM) 20 MG capsule Take 1 capsule by mouth Every Morning Before Breakfast.   Taking    gabapentin (NEURONTIN) 300 MG capsule Take 1 capsule by mouth 2 (Two) Times a Day.   Taking    hydrALAZINE (APRESOLINE) 100 MG tablet Take 1 tablet by mouth 3 (Three) Times a Day.   Taking    iron polysaccharides (NIFEREX) 150 MG capsule Take 1 capsule by mouth Daily.   Taking    isosorbide dinitrate (ISORDIL) 10 MG tablet Take 1 tablet by mouth 3 (Three) Times a Day. 270 tablet 2 Taking    levothyroxine (Synthroid) 100 MCG tablet Take 1 tablet by mouth Every Morning. 90 tablet 2 Taking    magnesium chloride ER 64 MG DR tablet Take 1 tablet by mouth Daily.   Taking    montelukast (SINGULAIR) 10 MG tablet Take 1 tablet by mouth Every Night.   Taking    Multiple Vitamins-Minerals (PRESERVISION/LUTEIN) capsule Take 1 capsule by mouth 2 (two) times a day.   Taking    NIFEdipine XL (ADALAT CC) 60 MG 24 hr tablet Take 2 tablets by mouth Daily.   Taking    spironolactone (ALDACTONE) 25 MG tablet Take 1 tablet by mouth Daily.   Taking    tamsulosin (FLOMAX) 0.4 MG capsule 24 hr  capsule Take 1 capsule by mouth Every Night.   Taking    valsartan (DIOVAN) 320 MG tablet Take 1 tablet by mouth Daily.   Taking    vitamin D (ERGOCALCIFEROL) 1.25 MG (61969 UT) capsule capsule Take 1 capsule by mouth 1 (One) Time Per Week. Mondays   Taking    albuterol sulfate  (90 Base) MCG/ACT inhaler Inhale 2 puffs Every 6 (Six) Hours As Needed for Wheezing or Shortness of Air.       bisacodyl (DULCOLAX) 10 MG suppository Insert 1 suppository into the rectum Daily As Needed for Constipation.       desoximetasone (TOPICORT) 0.25 % cream Apply 1 Application topically to the appropriate area as directed 2 (Two) Times a Day As Needed for Irritation. irritation 60 g 0     fluticasone (FLONASE) 50 MCG/ACT nasal spray Administer 2 sprays into the nostril(s) as directed by provider Daily As Needed for Allergies.       naloxone (NARCAN) 4 MG/0.1ML nasal spray Call 911. Don't prime. [place] in 1 nostril for overdose. Repeat in 2-3 minutes in other nostril if no or minimal breathing/responsiveness. 2 each 0     polyethylene glycol (MIRALAX) 17 g packet Take 17 g by mouth Daily As Needed (constipation).       senna 8.6 MG tablet Take 2 tablets by mouth Daily As Needed for Constipation.       [DISCONTINUED] cloNIDine (CATAPRES) 0.3 MG tablet Take 1 tablet by mouth 3 times a day. (Patient not taking: Reported on 6/21/2025)   Not Taking    [DISCONTINUED] gabapentin (NEURONTIN) 300 MG capsule Take 1 capsule by mouth Every Night. 24 capsule 0     [DISCONTINUED] omeprazole (priLOSEC) 20 MG capsule Take 1 capsule by mouth Daily. (Patient not taking: Reported on 6/21/2025)   Not Taking    [DISCONTINUED] oxyCODONE-acetaminophen (PERCOCET) 5-325 MG per tablet Take 1 tablet by mouth Every 6 (Six) Hours As Needed for Moderate Pain. 24 tablet 0        Medicines:  Current Facility-Administered Medications   Medication Dose Route Frequency Provider Last Rate Last Admin    acetaminophen (TYLENOL) tablet 650 mg  650 mg Oral Q4H PRN  Blayne Zabala MD   650 mg at 06/22/25 0040    Or    acetaminophen (TYLENOL) 160 MG/5ML oral solution 650 mg  650 mg Oral Q4H PRN Blayne Zabala MD        Or    acetaminophen (TYLENOL) suppository 650 mg  650 mg Rectal Q4H PRN Blayne Zabala MD        aspirin EC tablet 81 mg  81 mg Oral Daily Blayne Zabala MD   81 mg at 06/22/25 0905    atorvastatin (LIPITOR) tablet 10 mg  10 mg Oral Nightly Blayne Zabala MD   10 mg at 06/21/25 1939    sennosides-docusate (PERICOLACE) 8.6-50 MG per tablet 2 tablet  2 tablet Oral BID Blayne Zabala MD   2 tablet at 06/22/25 0905    And    polyethylene glycol (MIRALAX) packet 17 g  17 g Oral Daily PRN Blayne Zabala MD        And    bisacodyl (DULCOLAX) EC tablet 5 mg  5 mg Oral Daily PRN Blayne Zabala MD        And    bisacodyl (DULCOLAX) suppository 10 mg  10 mg Rectal Daily PRN Blayne Zabala MD        carvedilol (COREG) tablet 12.5 mg  12.5 mg Oral BID With Meals Blayne Zabala MD   12.5 mg at 06/22/25 0905    clopidogrel (PLAVIX) tablet 75 mg  75 mg Oral Daily Blayne Zabala MD   75 mg at 06/22/25 0905    epoetin cecile-epbx (RETACRIT) injection 10,000 Units  10,000 Units Subcutaneous Once per day on Monday Wednesday Friday Blayne Zabala MD   10,000 Units at 06/20/25 2039    gabapentin (NEURONTIN) capsule 300 mg  300 mg Oral Nightly Blayne Zabala MD   300 mg at 06/21/25 1940    [Held by provider] hydrALAZINE (APRESOLINE) tablet 100 mg  100 mg Oral TID Blayne Zabala MD   100 mg at 06/21/25 1941    isosorbide dinitrate (ISORDIL) tablet 10 mg  10 mg Oral TID - Nitrates Blayne Zabala MD   10 mg at 06/22/25 1200    levothyroxine (SYNTHROID, LEVOTHROID) tablet 100 mcg  100 mcg Oral Q AM Blayne Zablaa MD   100 mcg at 06/22/25 0643    montelukast (SINGULAIR) tablet 10 mg  10 mg Oral Nightly Blayne Zabala MD   10 mg at 06/21/25 1942    naloxone (NARCAN) injection 0.4 mg  0.4 mg Intravenous Q5 Min PRN Blayne Zabala MD        NIFEdipine XL  (PROCARDIA XL) 24 hr tablet 120 mg  120 mg Oral Daily Blayne Zabala MD   120 mg at 06/21/25 1943    nitroglycerin (NITROSTAT) SL tablet 0.4 mg  0.4 mg Sublingual Q5 Min PRN Blayne Zabala MD        oxyCODONE-acetaminophen (PERCOCET) 5-325 MG per tablet 1 tablet  1 tablet Oral Q6H PRN Blayne Zabala MD   1 tablet at 06/22/25 1200    promethazine (PHENERGAN) tablet 12.5 mg  12.5 mg Oral Q6H PRN Donal Rodriguez MD   12.5 mg at 06/20/25 2243    sodium chloride 0.9 % flush 10 mL  10 mL Intravenous Q12H Blayne Zabala MD   10 mL at 06/22/25 0906    sodium chloride 0.9 % flush 10 mL  10 mL Intravenous PRN Blayne Zabala MD        sodium chloride 0.9 % flush 10 mL  10 mL Intravenous Q12H Blayne Zabala MD   10 mL at 06/21/25 1914    sodium chloride 0.9 % flush 10 mL  10 mL Intravenous PRN Blayne Zabala MD        sodium chloride 0.9 % infusion 40 mL  40 mL Intravenous PRN Blayne Zabala MD        sodium chloride 0.9 % infusion 40 mL  40 mL Intravenous PRN Blayne Zabala MD        spironolactone (ALDACTONE) tablet 25 mg  25 mg Oral Daily Blayne Zabala MD   25 mg at 06/22/25 0905    tamsulosin (FLOMAX) 24 hr capsule 0.4 mg  0.4 mg Oral Nightly Blayne Zabala MD   0.4 mg at 06/21/25 1944    valsartan (DIOVAN) tablet 320 mg  320 mg Oral Daily Blayne Zabala MD   320 mg at 06/22/25 0905       Past Medical History:  Past Medical History:   Diagnosis Date    3-vessel CAD 08/11/2020    Allergic rhinitis     Anemia     Anxiety disorder 04/27/2020    Arthritis     Asymmetrical sensorineural hearing loss 06/28/2017    Atherosclerosis of native artery of both lower extremities with intermittent claudication 07/18/2019    Avascular necrosis of femoral head, left 07/11/2020    right hip after surgery    Carotid stenosis     Chronic mucoid otitis media     Chronic rhinitis     COPD (chronic obstructive pulmonary disease)     Coronary artery disease     HEART BYPASS 2004    Crohn's disease of large  intestine with other complication 07/30/2020    Chronic diarrhea Colonoscopy July 2020 revealed mild patchy scattered hemosiderin staining with inflammation more so in rectosigmoid area.  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    Iron deficiency anemia 07/14/2020    Mixed hearing loss of left ear     PAD (peripheral artery disease) 08/11/2020    Perianal abscess     Pernicious anemia 08/17/2020    took shots but never diagnosed with b12 deficiency    Personal history of alcoholism 08/11/2020    quit drinking in 2013    Prostatic hypertrophy 08/11/2020    Sensorineural hearing loss     Sepsis with acute renal failure 09/15/2020    Shortness of breath 05/27/2021    Tinnitus     Ventricular tachycardia, nonsustained 07/14/2020    Weight loss 07/11/2020       Past Surgical History:  Past Surgical History:   Procedure Laterality Date    AORTOGRAM Right 4/25/2025    Procedure: RIGHT LOWER EXTREMITY ANGIOGRAM, SHOCKWAVE LITHOTRIPSY, BALLOON ANGIOPLASTY, MYNX CLOSURE;  Surgeon: Gil Pineda DO;  Location:  [facility]BRID OR;  Service: Vascular;  Laterality: Right;    AORTOGRAM Left 5/22/2025    Procedure: LEFT LOWER EXTREMITY ANGIOGRAM, SHOCKWAVE LITHOTRIPSY, BALLOON ANGIOPLASTY, STENT PLACEMENT, MYNX CLOSURE;  Surgeon: Blayne Zabala MD;  Location:  [facility]BR[facility] OR;  Service: Vascular;  Laterality: Left;    AORTOGRAM Right 5/30/2025    Procedure: AORTOILIAC ANGIOGRAM WITH LEFT LOWER EXTREMITY ANGIOGRAM;  Surgeon: Gil Pineda DO;  Location:  [facility] OR;  Service: Vascular;  Laterality: Right;    ARTERY SURGERY  2021    right side on neck     CAROTID ENDARTERECTOMY Right 05/10/2021    Procedure: RIGHT CAROTID ENDARTERECTOMY WITH EEG;  Surgeon: Gil Pineda DO;  Location: [facility] HYBRID OR 12;  Service: Vascular;  Laterality: Right;    COLONOSCOPY N/A 07/02/2020    Procedure: COLONOSCOPY WITH ANESTHESIA;  Surgeon: Adrien Brewster MD;  Location: [facility] ENDOSCOPY;  Service: Gastroenterology;  Laterality: N/A;  pre op: diarrhea  post op: polyps  PCP: Joe Velasco MD    COLONOSCOPY N/A 10/13/2020    Procedure: COLONOSCOPY WITH ANESTHESIA;  Surgeon: Adrien Brewster MD;  Location: [facility] ENDOSCOPY;  Service: Gastroenterology;  Laterality: N/A;  Pre: Chronic Diarrhea, Crohn's  Post: AVM  Dr. Neftali Velasco  CO2 Inflation Used    COLONOSCOPY N/A 12/08/2023    Procedure: COLONOSCOPY WITH ANESTHESIA;  Surgeon: Adrien Brewster MD;  Location: [facility] ENDOSCOPY;  Service: Gastroenterology;  Laterality: N/A;  pre chrone's disease  post sub optimal prep, polyp, chrone's      CORONARY ARTERY BYPASS GRAFT  2003    x3    ENDOSCOPY N/A 11/02/2021    Procedure: ESOPHAGOGASTRODUODENOSCOPY WITH ANESTHESIA;  Surgeon: Bridger Bell MD;  Location: [facility] ENDOSCOPY;  Service: Gastroenterology;  Laterality: N/A;  pre anemia;gi bleed  post  gi bleed;schatski ring  Dr. ERIC Velasco    ENDOSCOPY N/A 10/10/2023    Procedure: ESOPHAGOGASTRODUODENOSCOPY WITH ANESTHESIA;  Surgeon: Adrien Brewster MD;  Location: [facility] ENDOSCOPY;  Service: Gastroenterology;  Laterality: N/A;  preop; anemia  postop esophagitis ; R/O barretts   PCP Randall Beata    EYE SURGERY Bilateral     catorac    INCISION AND DRAINAGE PERIRECTAL ABSCESS N/A 03/03/2017    Procedure: INCISION AND DRAINAGE OF JEET ANAL ABSCESS;  Surgeon: Lynette Smith MD;  Location: [facility] OR;  Service:     INGUINAL HERNIA REPAIR Bilateral 06/27/2023    Procedure: INGUINAL HERNIA BILATERAL REPAIR LAPAROSCOPIC WITH DAVINCI ROBOT WITH MESH;  Surgeon: Tahira Rivera MD;  Location: [facility] OR;  Service:  Robotics - DaVinci;  Laterality: Bilateral;    INSERTION HEMODIALYSIS CATHETER N/A 2025    Procedure: HEMODIALYSIS CATHETER PLACEMENT;  Surgeon: Gil Pineda DO;  Location: [facility] HYBRID OR;  Service: Vascular;  Laterality: N/A;    MYRINGOTOMY W/ TUBES Left 2017    06/10/2016    TONSILLECTOMY      TOTAL HIP ARTHROPLASTY Right 2006       Family History  Family History   Problem Relation Age of Onset    Breast cancer Mother     Dementia Father     Glaucoma Father     No Known Problems Daughter     Colon polyps Neg Hx     Colon cancer Neg Hx        Social History  Social History     Socioeconomic History    Marital status:    Tobacco Use    Smoking status: Former     Current packs/day: 0.00     Average packs/day: 0.5 packs/day for 25.8 years (12.9 ttl pk-yrs)     Types: Cigarettes     Start date:      Quit date: 10/13/2013     Years since quittin.6     Passive exposure: Past    Smokeless tobacco: Never    Tobacco comments:     quit    Vaping Use    Vaping status: Former    Substances: Nicotine    Devices: Pre-filled or refillable cartridge   Substance and Sexual Activity    Alcohol use: Not Currently    Drug use: No    Sexual activity: Not Currently     Partners: Female       Review of Systems:  History obtained from chart review and the patient  General ROS: No fever or chills  Respiratory ROS: No cough, shortness of breath, wheezing  Cardiovascular ROS: No chest pain or palpitations  Gastrointestinal ROS: No abdominal pain or melena  Genito-Urinary ROS: No dysuria or hematuria  Psych ROS: No anxiety and depression  14 point ROS reviewed with the patient and negative except as noted above and in the HPI unless unable to obtain.    Objective:  Patient Vitals for the past 24 hrs:   BP Temp Temp src Pulse Resp SpO2   25 0814 154/50 98.4 °F (36.9 °C) Oral 66 18 98 %   25 0553 140/45 -- -- 63 -- --   25 0331 172/47 98.7 °F (37.1 °C) Oral 66 16 100 %   25 5233  (!) 181/58 98.8 °F (37.1 °C) Oral 75 14 98 %   06/21/25 1914 (!) 184/56 99 °F (37.2 °C) Oral 79 14 98 %   06/21/25 1552 173/56 98.9 °F (37.2 °C) Oral 71 14 97 %       Intake/Output Summary (Last 24 hours) at 6/22/2025 1324  Last data filed at 6/22/2025 0814  Gross per 24 hour   Intake 0 ml   Output 500 ml   Net -500 ml     General: awake/alert   HEENT: Normocephalic atraumatic head  Neck: Supple with no JVD or carotid bruits.  Chest:  clear to auscultation bilaterally without respiratory distress  CVS: regular rate and rhythm  Abdominal: soft, nontender, positive bowel sounds  Extremities: Bluish color of toes of right foot.  Skin: warm and dry without rash      Labs:  Results from last 7 days   Lab Units 06/22/25  0510 06/21/25  0620 06/20/25  0307   WBC 10*3/mm3 8.02 5.79 5.40   HEMOGLOBIN g/dL 8.7* 7.5* 7.9*   HEMATOCRIT % 26.8* 23.0* 24.8*   PLATELETS 10*3/mm3 119* 100* 111*         Results from last 7 days   Lab Units 06/22/25  0510 06/21/25  0620 06/20/25  1438 06/20/25  0307 06/19/25  1506   SODIUM mmol/L 136 140 138   < > 135*   POTASSIUM mmol/L 3.7 3.6 3.7   < > 4.0   CHLORIDE mmol/L 102 104 101   < > 98   CO2 mmol/L 22.0 22.0 27.0   < > 27.0   BUN mg/dL 37.5* 23.3* 11.1   < > 32.8*   CREATININE mg/dL 3.17* 2.06* 1.20   < > 2.66*   CALCIUM mg/dL 9.2 8.2* 8.6   < > 8.6   EGFR mL/min/1.73 19.4* 32.6* 62.3   < > 24.0*   BILIRUBIN mg/dL 0.4 0.4  --   --  0.3   ALK PHOS U/L 174* 132*  --   --  180*   ALT (SGPT) U/L <5 <5  --   --  10   AST (SGOT) U/L 16 13  --   --  17   GLUCOSE mg/dL 107* 84 84   < > 107*    < > = values in this interval not displayed.       Radiology:   Imaging Results (Last 72 Hours)       Procedure Component Value Units Date/Time    IR Angiogram Extremity - In process [[id number]] Resulted: 06/20/25 1525     Updated: 06/20/25 1710    This result has not been signed. Information might be incomplete.      FL C Arm During Surgery [[id number]] Resulted: 06/20/25 1710     Updated: 06/20/25 1710     Narrative:      This procedure was auto-finalized with no dictation required.    US Arterial Doppler Lower Extremity Right [[id number]] Collected: 06/19/25 1819     Updated: 06/19/25 1824    Narrative:      History: [facility]     Comments: Grayscale imaging as well as color flow duplex were used to  evaluate the right lower extremity arterial system     On the right, the peak systolic velocity in the common femoral artery  measured 69 cm/s. In the profunda femoris artery measured 47.6 cm/s. In  the proximal SFA measured 69 cm/s. The mid SFA measured 56.3 cm/s. In  the distal SFA measured 57.9 cm/s. In the popliteal artery measured 31.2  cm/s. In the posterior tibial artery measured 34.9 cm/s. In the anterior  tibial artery measured 22 cm/s.          Impression:      No hemodynamically significant stenosis identified in the  right lower extremity. There is near continuous flow in the tibial  vessels which indicates a significant stenosis versus occlusion of the  more proximal arteries. There is monophasic flow in the right common  femoral artery which may indicate more proximal stenosis.     This report was signed and finalized on 6/19/2025 6:21 PM by Blayne Zabala.       XR Chest 1 View [[id number]] Collected: 06/19/25 1546     Updated: 06/19/25 1552    Narrative:      EXAM: XR CHEST 1 VW-         HISTORY: work up       COMPARISON: 5/29/2025.     TECHNIQUE:  Frontal and lateral views of the chest submitted.     FINDINGS:    There is increasing opacity over the right hemithorax may represent  increasing layering right pleural effusion. Underlying pneumonia not  excluded. There is probable volume overload. A right IJ tunneled central  venous dialysis catheter tip is at the SVC. The patient is status post  median sternotomy and CABG. There is calcification of the thoracic  aorta. No left effusion is seen and no pneumothorax identified. There is  sclerosis at the right humeral head may represent avascular necrosis.           "Impression:      1. Increasing diffuse opacity of the right hemithorax may represent  increasing layering right pleural effusion and probable volume overload.     This report was signed and finalized on 6/19/2025 3:48 PM by Eleuterio Rivera.               Culture:  No results found for: \"BLOODCX\", \"URINECX\", \"WOUNDCX\", \"MRSACX\", \"RESPCX\", \"STOOLCX\"      Assessment   1.  End-stage renal disease on maintenance dialysis.  2.  Hypertensive nephrosclerosis.  3.  Severe peripheral vascular disease, status post revascularization include lithotripsy as well as angioplasty of right popliteal and posterior tibial artery of right leg.  4.  Anemia of chronic kidney disease.  5.  History of carotid occlusive disease    Plan:  1.  Routine hemodialysis treatment tomorrow.  2.  Continue ABILIO.  3.  Plan was discussed with the patient      Giuliano Saldana MD  6/22/2025  13:24 CDT    "

## 2025-06-22 NOTE — PLAN OF CARE
Goal Outcome Evaluation:    Medicated once for complaints of bilateral lower extremity pain, pain in right leg worse than pain in left leg. Left groin puncture site still oozing. Dressing has been changed 2 times this shift. Family has been at bedside this shift. Vascular surgery has been notified that patient is still having oozing from his site. Patient safety has been maintained this shift , continue to monitor and report abnormal to provider .

## 2025-06-22 NOTE — PLAN OF CARE
Goal Outcome Evaluation:  Plan of Care Reviewed With: patient     Pt lower extremely pulses found only with doppler. Right foot brings patient the most discomfort (7-8) and has a purple middle toe present. Moves self in bed. Groin pressure mackay device removed site is still oozing this am at 0530 fresh blood on brief and some coagulated blood pressure applied c new dessing and 10lb sand bag currently on site.      Progress: no change

## 2025-06-23 LAB
ABO GROUP BLD: NORMAL
ALBUMIN SERPL-MCNC: 3.1 G/DL (ref 3.5–5.2)
ALBUMIN/GLOB SERPL: 1.3 G/DL
ALP SERPL-CCNC: 144 U/L (ref 39–117)
ALT SERPL W P-5'-P-CCNC: <5 U/L (ref 1–41)
ANION GAP SERPL CALCULATED.3IONS-SCNC: 12 MMOL/L (ref 5–15)
AST SERPL-CCNC: 12 U/L (ref 1–40)
BASOPHILS # BLD AUTO: 0.06 10*3/MM3 (ref 0–0.2)
BASOPHILS NFR BLD AUTO: 0.9 % (ref 0–1.5)
BILIRUB SERPL-MCNC: 0.3 MG/DL (ref 0–1.2)
BLD GP AB SCN SERPL QL: NEGATIVE
BUN SERPL-MCNC: 44.4 MG/DL (ref 8–23)
BUN/CREAT SERPL: 11.7 (ref 7–25)
CALCIUM SPEC-SCNC: 8.6 MG/DL (ref 8.6–10.5)
CHLORIDE SERPL-SCNC: 101 MMOL/L (ref 98–107)
CO2 SERPL-SCNC: 22 MMOL/L (ref 22–29)
CREAT SERPL-MCNC: 3.81 MG/DL (ref 0.76–1.27)
DEPRECATED RDW RBC AUTO: 66.2 FL (ref 37–54)
EGFRCR SERPLBLD CKD-EPI 2021: 15.6 ML/MIN/1.73
EOSINOPHIL # BLD AUTO: 0.37 10*3/MM3 (ref 0–0.4)
EOSINOPHIL NFR BLD AUTO: 5.4 % (ref 0.3–6.2)
ERYTHROCYTE [DISTWIDTH] IN BLOOD BY AUTOMATED COUNT: 17.7 % (ref 12.3–15.4)
GLOBULIN UR ELPH-MCNC: 2.4 GM/DL
GLUCOSE SERPL-MCNC: 99 MG/DL (ref 65–99)
HCT VFR BLD AUTO: 23.8 % (ref 37.5–51)
HGB BLD-MCNC: 7.6 G/DL (ref 13–17.7)
IMM GRANULOCYTES # BLD AUTO: 0.08 10*3/MM3 (ref 0–0.05)
IMM GRANULOCYTES NFR BLD AUTO: 1.2 % (ref 0–0.5)
LYMPHOCYTES # BLD AUTO: 1.05 10*3/MM3 (ref 0.7–3.1)
LYMPHOCYTES NFR BLD AUTO: 15.4 % (ref 19.6–45.3)
MCH RBC QN AUTO: 32.6 PG (ref 26.6–33)
MCHC RBC AUTO-ENTMCNC: 31.9 G/DL (ref 31.5–35.7)
MCV RBC AUTO: 102.1 FL (ref 79–97)
MONOCYTES # BLD AUTO: 0.93 10*3/MM3 (ref 0.1–0.9)
MONOCYTES NFR BLD AUTO: 13.6 % (ref 5–12)
NEUTROPHILS NFR BLD AUTO: 4.35 10*3/MM3 (ref 1.7–7)
NEUTROPHILS NFR BLD AUTO: 63.5 % (ref 42.7–76)
NRBC BLD AUTO-RTO: 0 /100 WBC (ref 0–0.2)
PLATELET # BLD AUTO: 125 10*3/MM3 (ref 140–450)
PMV BLD AUTO: 9.7 FL (ref 6–12)
POTASSIUM SERPL-SCNC: 3.7 MMOL/L (ref 3.5–5.2)
PROT SERPL-MCNC: 5.5 G/DL (ref 6–8.5)
RBC # BLD AUTO: 2.33 10*6/MM3 (ref 4.14–5.8)
RH BLD: NEGATIVE
SODIUM SERPL-SCNC: 135 MMOL/L (ref 136–145)
T&S EXPIRATION DATE: NORMAL
WBC NRBC COR # BLD AUTO: 6.84 10*3/MM3 (ref 3.4–10.8)

## 2025-06-23 PROCEDURE — 80053 COMPREHEN METABOLIC PANEL: CPT | Performed by: INTERNAL MEDICINE

## 2025-06-23 PROCEDURE — 86850 RBC ANTIBODY SCREEN: CPT | Performed by: STUDENT IN AN ORGANIZED HEALTH CARE EDUCATION/TRAINING PROGRAM

## 2025-06-23 PROCEDURE — 85025 COMPLETE CBC W/AUTO DIFF WBC: CPT | Performed by: INTERNAL MEDICINE

## 2025-06-23 PROCEDURE — 86920 COMPATIBILITY TEST SPIN: CPT

## 2025-06-23 PROCEDURE — 86901 BLOOD TYPING SEROLOGIC RH(D): CPT

## 2025-06-23 PROCEDURE — 86900 BLOOD TYPING SEROLOGIC ABO: CPT

## 2025-06-23 PROCEDURE — 25010000002 HEPARIN (PORCINE) PER 1000 UNITS: Performed by: INTERNAL MEDICINE

## 2025-06-23 PROCEDURE — 86900 BLOOD TYPING SEROLOGIC ABO: CPT | Performed by: STUDENT IN AN ORGANIZED HEALTH CARE EDUCATION/TRAINING PROGRAM

## 2025-06-23 PROCEDURE — 86901 BLOOD TYPING SEROLOGIC RH(D): CPT | Performed by: STUDENT IN AN ORGANIZED HEALTH CARE EDUCATION/TRAINING PROGRAM

## 2025-06-23 PROCEDURE — 25010000002 EPOETIN ALFA-EPBX 10000 UNIT/ML SOLUTION: Performed by: STUDENT IN AN ORGANIZED HEALTH CARE EDUCATION/TRAINING PROGRAM

## 2025-06-23 PROCEDURE — 86923 COMPATIBILITY TEST ELECTRIC: CPT

## 2025-06-23 RX ORDER — HEPARIN SODIUM 1000 [USP'U]/ML
3200 INJECTION, SOLUTION INTRAVENOUS; SUBCUTANEOUS AS NEEDED
Status: DISCONTINUED | OUTPATIENT
Start: 2025-06-23 | End: 2025-06-27

## 2025-06-23 RX ORDER — OXYCODONE AND ACETAMINOPHEN 5; 325 MG/1; MG/1
1 TABLET ORAL EVERY 4 HOURS PRN
Refills: 0 | Status: DISPENSED | OUTPATIENT
Start: 2025-06-23

## 2025-06-23 RX ADMIN — OXYCODONE AND ACETAMINOPHEN 1 TABLET: 5; 325 TABLET ORAL at 14:18

## 2025-06-23 RX ADMIN — OXYCODONE AND ACETAMINOPHEN 1 TABLET: 5; 325 TABLET ORAL at 18:26

## 2025-06-23 RX ADMIN — DOCUSATE SODIUM 50 MG AND SENNOSIDES 8.6 MG 2 TABLET: 8.6; 5 TABLET, FILM COATED ORAL at 14:07

## 2025-06-23 RX ADMIN — Medication 10 ML: at 14:17

## 2025-06-23 RX ADMIN — ATORVASTATIN CALCIUM 10 MG: 10 TABLET, FILM COATED ORAL at 21:00

## 2025-06-23 RX ADMIN — ISOSORBIDE DINITRATE 10 MG: 10 TABLET ORAL at 14:09

## 2025-06-23 RX ADMIN — Medication 10 ML: at 21:00

## 2025-06-23 RX ADMIN — NIFEDIPINE 120 MG: 60 TABLET, FILM COATED, EXTENDED RELEASE ORAL at 18:23

## 2025-06-23 RX ADMIN — ACETAMINOPHEN 650 MG: 325 TABLET, FILM COATED ORAL at 07:53

## 2025-06-23 RX ADMIN — MONTELUKAST SODIUM 10 MG: 10 TABLET, COATED ORAL at 21:00

## 2025-06-23 RX ADMIN — ACETAMINOPHEN 650 MG: 325 TABLET, FILM COATED ORAL at 18:42

## 2025-06-23 RX ADMIN — ISOSORBIDE DINITRATE 10 MG: 10 TABLET ORAL at 07:53

## 2025-06-23 RX ADMIN — EPOETIN ALFA-EPBX 10000 UNITS: 10000 INJECTION, SOLUTION INTRAVENOUS; SUBCUTANEOUS at 14:08

## 2025-06-23 RX ADMIN — TAMSULOSIN HYDROCHLORIDE 0.4 MG: 0.4 CAPSULE ORAL at 21:00

## 2025-06-23 RX ADMIN — OXYCODONE AND ACETAMINOPHEN 1 TABLET: 5; 325 TABLET ORAL at 06:23

## 2025-06-23 RX ADMIN — OXYCODONE AND ACETAMINOPHEN 1 TABLET: 5; 325 TABLET ORAL at 22:30

## 2025-06-23 RX ADMIN — CLOPIDOGREL BISULFATE 75 MG: 75 TABLET, FILM COATED ORAL at 14:08

## 2025-06-23 RX ADMIN — CARVEDILOL 12.5 MG: 6.25 TABLET, FILM COATED ORAL at 07:53

## 2025-06-23 RX ADMIN — GABAPENTIN 300 MG: 300 CAPSULE ORAL at 21:00

## 2025-06-23 RX ADMIN — LEVOTHYROXINE SODIUM 100 MCG: 0.1 TABLET ORAL at 05:55

## 2025-06-23 RX ADMIN — OXYCODONE AND ACETAMINOPHEN 1 TABLET: 325; 10 TABLET ORAL at 00:11

## 2025-06-23 RX ADMIN — ASPIRIN 81 MG: 81 TABLET, COATED ORAL at 14:10

## 2025-06-23 RX ADMIN — HEPARIN SODIUM 3200 UNITS: 1000 INJECTION INTRAVENOUS; SUBCUTANEOUS at 13:08

## 2025-06-23 NOTE — PROGRESS NOTES
[facility] Medicine Services  INPATIENT PROGRESS NOTE    Patient Name: Ricardo Hugo  Date of Admission: 6/19/2025  Today's Date: 06/23/25  Length of Stay: 4  Primary Care Physician: Nathan Zimmer MD    Subjective   Chief Complaint: f/u PAD/occlusion    HPI   Patient seen and evaluated.  Family at bedside.  He continues to complain of pain in his right foot.  Other than that he feels okay.  Denies chest pain.  Denies shortness of breath.  No nausea.  No fevers noted.      Review of Systems   All pertinent negatives and positives are as above. All other systems have been reviewed and are negative unless otherwise stated.     Objective    Temp:  [97.4 °F (36.3 °C)-98.3 °F (36.8 °C)] 97.4 °F (36.3 °C)  Heart Rate:  [55-66] 61  Resp:  [14-18] 16  BP: (105-155)/(44-62) 132/58  Physical Exam  GEN: Awake, alert, interactive, in NAD, appears thin/frail  HEENT: PERRLA, EOMI, Anicteric, Trachea midline  Lungs: no wheezing/rales/rhonchi  Heart: RRR, +S1/s2, no rub  ABD: soft, nt/nd, +BS, no guarding/rebound  Extremities: Ongoing discoloration of right 2nd and 3rd toes, second toe is actually starting to become a little more pale and have color today.  Third toe has become dark and gangrenous.  Starting to have soft tissue ulceration on dorsal aspect.    Neuro: AAOx3, no focal deficits  Psych: normal mood & affect        Results Review:  I have reviewed the labs, radiology results, and diagnostic studies.    Laboratory Data:   Results from last 7 days   Lab Units 06/23/25  0339 06/22/25  0510 06/21/25  0620   WBC 10*3/mm3 6.84 8.02 5.79   HEMOGLOBIN g/dL 7.6* 8.7* 7.5*   HEMATOCRIT % 23.8* 26.8* 23.0*   PLATELETS 10*3/mm3 125* 119* 100*        Results from last 7 days   Lab Units 06/23/25  0339 06/22/25  0510 06/21/25  0620   SODIUM mmol/L 135* 136 140   POTASSIUM mmol/L 3.7 3.7 3.6   CHLORIDE mmol/L 101 102 104   CO2 mmol/L 22.0 22.0 22.0   BUN mg/dL 44.4* 37.5* 23.3*   CREATININE  "mg/dL 3.81* 3.17* 2.06*   CALCIUM mg/dL 8.6 9.2 8.2*   BILIRUBIN mg/dL 0.3 0.4 0.4   ALK PHOS U/L 144* 174* 132*   ALT (SGPT) U/L <5 <5 <5   AST (SGOT) U/L 12 16 13   GLUCOSE mg/dL 99 107* 84       Culture Data:   No results found for: \"BLOODCX\", \"URINECX\", \"WOUNDCX\", \"MRSACX\", \"RESPCX\", \"STOOLCX\"    Radiology Data:   Imaging Results (Last 24 Hours)       ** No results found for the last 24 hours. **            I have reviewed the patient's current medications.     Assessment/Plan   Assessment  Active Hospital Problems    Diagnosis     **Vascular occlusion     ESRD (end stage renal disease) on dialysis     Chronic diastolic (congestive) heart failure     CLL (chronic lymphocytic leukemia)     Peripheral arterial disease     Essential hypertension        Treatment Plan  #1 vascular occlusion -with intervention on 6/22 right popliteal artery and peroneal artery.  Also had PTA to the right posterior tibial artery.  Was on heparin drip preprocedure.  Now off and on aspirin, Plavix, statin.  Still having some oozing from his left femoral access site.  Having worsening discoloration of his right 3rd toes despite dopplerable pulse.  He is to be having ongoing ischemic/gangrenous changes to his toe.  Starting to develop an ischemic wound today.  I discussed with patient and family possible need for podiatry evaluation for toe amputation however I we will try to discuss case with Dr. Zabala prior.  Unclear how much revascularization was obtained.  If patient does not have good blood flow to his foot toe amputation will not be helpful.  Could not get up needing a AKA or BKA.    #2 ESRD -postdialysis today.  Nephrology following.  Continue as per schedule.  No immediate dialysis needs.    #3 chronic diastolic CHF -appears euvolemic.  No signs of exacerbation.    #4 hypertension -Home BP meds resumed.  Monitor.  He is on extensive regimen of nifedipine, Isordil, hydralazine, Coreg, Aldactone.  Hydralazine was put on hold " yesterday.  Patient continues to intermittently get some blood pressures held by nursing staff due to low diastolic pressures.  Is making it very difficult to manage his blood pressure regimen.  Will hold Aldactone and valsartan and monitor with other meds.    #5 chronic constipation -continue bowel regimen.  Positive BM    Medical Decision Making  Number and Complexity of problems: 1 acute, multiple chronic  Differential Diagnosis: As above    Conditions and Status        Initially had some improvement but now having worsening of his right foot with gangrenous changes to his third toe.  Will discuss case with vascular.  May need further intervention and amputation.     [facility] Data  External documents reviewed: None  Cardiac tracing (EKG, telemetry) interpretation: None  Radiology interpretation: Reports reviewed  Labs reviewed: As above  Any tests that were considered but not ordered: None     Decision rules/scores evaluated (example [id number], [place], etc): None     Discussed with: Patient, nursing, SW     Care Planning  Shared decision making: Patient apprised of current labs, vitals, imaging and treatment plan.  They are agreeable with proceeding with plans as discussed.    Code status and discussions: full code    Disposition  Social Determinants of Health that impact treatment or disposition: none    Patient has a bed at [facility] but not ready for discharge.  Having worsening ischemic changes.  Will follow-up.        Electronically signed by Dc Issa DO, 06/23/25, 12:46 CDT.

## 2025-06-23 NOTE — PLAN OF CARE
Goal Outcome Evaluation:  Plan of Care Reviewed With: patient, family        Progress: no change    Dialysis today. Hypotensive today - asymptomatic. Medicated for pain. Dialysis today and removed 1500cc. Refuses turns at times. Dolphin bed.  Hg low, but stable. Safety maintained. A/O. Family at bedside. Cooperative with care.

## 2025-06-23 NOTE — PROGRESS NOTES
LOS: 4 days   Patient Care Team:  Nathan Zimmer MD as PCP - General (Internal Medicine)  Adrien Brewster MD as Consulting Physician (Gastroenterology)  Eleuterio Farley MD (Inactive) as Cardiologist (Cardiology)  Elena Jon APRN as Referring Physician (Vascular Surgery)  Bolivar Rueda MD as Consulting Physician (Nephrology)  Slava Tate APRN as Nurse Practitioner (Family Medicine)  New Omalley MD as Consulting Physician (Pulmonary Disease)  Becky Camacho APRN as Nurse Practitioner (Hematology and Oncology)  Gil Pineda DO as Consulting Physician (Vascular Surgery)  Malu Lozano, DEMETRIUS as Ambulatory  ([facility])    Chief Complaint: RLE rest pain and wounds    Subjective     Patient Complaints: Pt now states that he has redeveloped pain in the right foot. Ischemic wounds present on the right 3rd toe.     Objective     Vital Signs  Temp:  [97.4 °F (36.3 °C)-98.3 °F (36.8 °C)] 97.9 °F (36.6 °C)  Heart Rate:  [59-66] 64  Resp:  [16] 16  BP: (105-150)/(40-58) 129/40    Right foot with stable signals. New dry gangrenous changes to the right 3rd toe.     Laboratory Data:   Results from last 7 days   Lab Units 06/23/25  0339 06/22/25  0510 06/21/25  0620   WBC 10*3/mm3 6.84 8.02 5.79   HEMOGLOBIN g/dL 7.6* 8.7* 7.5*   HEMATOCRIT % 23.8* 26.8* 23.0*   PLATELETS 10*3/mm3 125* 119* 100*       Results from last 7 days   Lab Units 06/23/25  0339 06/22/25  0510 06/21/25  0620   SODIUM mmol/L 135* 136 140   POTASSIUM mmol/L 3.7 3.7 3.6   CHLORIDE mmol/L 101 102 104   CO2 mmol/L 22.0 22.0 22.0   BUN mg/dL 44.4* 37.5* 23.3*   CREATININE mg/dL 3.81* 3.17* 2.06*   CALCIUM mg/dL 8.6 9.2 8.2*   BILIRUBIN mg/dL 0.3 0.4 0.4   ALK PHOS U/L 144* 174* 132*   ALT (SGPT) U/L <5 <5 <5   AST (SGOT) U/L 12 16 13   GLUCOSE mg/dL 99 107* 84     Results from last 7 days   Lab Units 06/19/25  1506   PROTIME Seconds 15.7*   INR  1.19*   APTT seconds 33.3           Spoke with Dr Jessika Pereira pt's last dose Eliquis was Friday 11/29/19 he stated that was fine ok to proceed   Medication Review: Reviewed    Assessment & Plan       Vascular occlusion    Essential hypertension    Peripheral arterial disease    CLL (chronic lymphocytic leukemia)    Chronic diastolic (congestive) heart failure    ESRD (end stage renal disease) on dialysis          Plan for disposition:  75-year-old male with right lower extremity ischemic rest pain.  Patient status post right lower extremity arteriogram shockwave angioplasty of the popliteal and tibial vessels.    Patient has returned right lower extremity rest pain.  He now has gangrenous changes to the toes.  After extensive discussion with the patient and his family he is elected for a right above-knee amputation. Risks/benefits were explained at great length to the patient which include but are not limited to bleeding, infection, vessel damage, nerve damage, failure of wound to heal, MI, stroke, and death.  The patient understands all the risks and wishes for me to proceed. NPOpMN.        Blayne Zabala MD  Vascular Surgery  [phone]  06/23/25  17:39 CDT

## 2025-06-23 NOTE — PROGRESS NOTES
Nephrology ([facility]) Progress Note      Patient:  Ricardo Hugo  YOB: 1950  Date of Service: 6/23/2025  MRN: [id number]   Acct: [id number]   Primary Care Physician: Nathan Zimmer MD  Advance Directive:   Code Status and Medical Interventions: CPR (Attempt to Resuscitate); Full Support   Ordered at: 06/19/25 1944     Code Status (Patient has no pulse and is not breathing):    CPR (Attempt to Resuscitate)     Medical Interventions (Patient has pulse or is breathing):    Full Support     Admit Date: 6/19/2025       Hospital Day: 4  Referring Provider: No Known Provider      Patient personally seen and examined.  Complete chart including Consults, Notes, Operative Reports, Labs, Cardiology, and Radiology studies reviewed as able.        Subjective:  Ricardo Hugo is a 75 y.o. male for whom we were consulted for evaluation and treatment of end-stage renal disease on maintenance dialysis. Patient has permacatheter as a dialysis access. He also has history of peripheral vascular disease, right carotid endarterectomy, PTA of the right external iliac artery, right SFA. Presenting to the emergency room complaining of right leg pain. His pain has progressively getting worse and has decided come to the emergency room. Patient is currently being evaluated by Dr. Zabala for another arteriogram, look at the status of his blood flow to the right leg. On June 28 patient underwent intravascular lithotripsy of popliteal artery, posterior tibial artery followed by balloon angioplasties.     Today is awake and alert. Still some pain in lower extremities. Seen during dialysis and tolerating well.   Dialysis   Patient was seen and evaluated on renal replacement therapy and I have personally evaluated the patient and directed the therapy  Modality: Hemodialysis  Access: Catheter  Location: right upper  QB: 450  QD: 700  UF: 2500    Allergies:  Ondansetron, Zofran [ondansetron hcl],  Lortab [hydrocodone-acetaminophen], and Allopurinol    Home Meds:  Medications Prior to Admission   Medication Sig Dispense Refill Last Dose/Taking    ALPRAZolam (XANAX) 0.25 MG tablet Take 1 tablet by mouth Every Night.   Taking    aspirin (aspirin) 81 MG EC tablet Take 1 tablet by mouth Daily.   Taking    atorvastatin (LIPITOR) 10 MG tablet Take 1 tablet by mouth Daily. 90 tablet 3 Taking    Budeson-Glycopyrrol-Formoterol (Breztri Aerosphere) 160-9-4.8 MCG/ACT aerosol inhaler Inhale 2 puffs 2 (Two) Times a Day.   Taking    calcitriol (ROCALTROL) 0.5 MCG capsule Take 1 capsule by mouth Daily. 90 capsule 2 Taking    carvedilol (COREG) 12.5 MG tablet Take 1 tablet by mouth 2 (Two) Times a Day With Meals.   Taking    clopidogrel (PLAVIX) 75 MG tablet Take 1 tablet by mouth Daily.   Taking    docusate sodium (COLACE) 100 MG capsule Take 1 capsule by mouth 3 (Three) Times a Day As Needed for Constipation.   Taking As Needed    empagliflozin (Jardiance) 10 MG tablet tablet Take 1 tablet by mouth Daily.   Taking    esomeprazole (nexIUM) 20 MG capsule Take 1 capsule by mouth Every Morning Before Breakfast.   Taking    gabapentin (NEURONTIN) 300 MG capsule Take 1 capsule by mouth 2 (Two) Times a Day.   Taking    hydrALAZINE (APRESOLINE) 100 MG tablet Take 1 tablet by mouth 3 (Three) Times a Day.   Taking    iron polysaccharides (NIFEREX) 150 MG capsule Take 1 capsule by mouth Daily.   Taking    isosorbide dinitrate (ISORDIL) 10 MG tablet Take 1 tablet by mouth 3 (Three) Times a Day. 270 tablet 2 Taking    levothyroxine (Synthroid) 100 MCG tablet Take 1 tablet by mouth Every Morning. 90 tablet 2 Taking    magnesium chloride ER 64 MG DR tablet Take 1 tablet by mouth Daily.   Taking    montelukast (SINGULAIR) 10 MG tablet Take 1 tablet by mouth Every Night.   Taking    Multiple Vitamins-Minerals (PRESERVISION/LUTEIN) capsule Take 1 capsule by mouth 2 (two) times a day.   Taking    NIFEdipine XL (ADALAT CC) 60 MG 24 hr tablet  Take 2 tablets by mouth Daily.   Taking    spironolactone (ALDACTONE) 25 MG tablet Take 1 tablet by mouth Daily.   Taking    tamsulosin (FLOMAX) 0.4 MG capsule 24 hr capsule Take 1 capsule by mouth Every Night.   Taking    valsartan (DIOVAN) 320 MG tablet Take 1 tablet by mouth Daily.   Taking    vitamin D (ERGOCALCIFEROL) 1.25 MG (83423 UT) capsule capsule Take 1 capsule by mouth 1 (One) Time Per Week. Mondays   Taking    albuterol sulfate  (90 Base) MCG/ACT inhaler Inhale 2 puffs Every 6 (Six) Hours As Needed for Wheezing or Shortness of Air.       bisacodyl (DULCOLAX) 10 MG suppository Insert 1 suppository into the rectum Daily As Needed for Constipation.       desoximetasone (TOPICORT) 0.25 % cream Apply 1 Application topically to the appropriate area as directed 2 (Two) Times a Day As Needed for Irritation. irritation 60 g 0     fluticasone (FLONASE) 50 MCG/ACT nasal spray Administer 2 sprays into the nostril(s) as directed by provider Daily As Needed for Allergies.       naloxone (NARCAN) 4 MG/0.1ML nasal spray Call 911. Don't prime. [place] in 1 nostril for overdose. Repeat in 2-3 minutes in other nostril if no or minimal breathing/responsiveness. 2 each 0     polyethylene glycol (MIRALAX) 17 g packet Take 17 g by mouth Daily As Needed (constipation).       senna 8.6 MG tablet Take 2 tablets by mouth Daily As Needed for Constipation.       [DISCONTINUED] cloNIDine (CATAPRES) 0.3 MG tablet Take 1 tablet by mouth 3 times a day. (Patient not taking: Reported on 6/21/2025)   Not Taking    [DISCONTINUED] gabapentin (NEURONTIN) 300 MG capsule Take 1 capsule by mouth Every Night. 24 capsule 0     [DISCONTINUED] omeprazole (priLOSEC) 20 MG capsule Take 1 capsule by mouth Daily. (Patient not taking: Reported on 6/21/2025)   Not Taking    [DISCONTINUED] oxyCODONE-acetaminophen (PERCOCET) 5-325 MG per tablet Take 1 tablet by mouth Every 6 (Six) Hours As Needed for Moderate Pain. 24 tablet 0         Medicines:  Current Facility-Administered Medications   Medication Dose Route Frequency Provider Last Rate Last Admin    acetaminophen (TYLENOL) tablet 650 mg  650 mg Oral Q4H PRN Blayne Zabala MD   650 mg at 06/23/25 0753    Or    acetaminophen (TYLENOL) 160 MG/5ML oral solution 650 mg  650 mg Oral Q4H PRN Blayne Zabala MD        Or    acetaminophen (TYLENOL) suppository 650 mg  650 mg Rectal Q4H PRN Blayne Zabala MD        aspirin EC tablet 81 mg  81 mg Oral Daily Blayne Zabala MD   81 mg at 06/22/25 0905    atorvastatin (LIPITOR) tablet 10 mg  10 mg Oral Nightly Blayne Zabala MD   10 mg at 06/22/25 2056    sennosides-docusate (PERICOLACE) 8.6-50 MG per tablet 2 tablet  2 tablet Oral BID Blayne Zabala MD   2 tablet at 06/22/25 2056    And    polyethylene glycol (MIRALAX) packet 17 g  17 g Oral Daily PRN Blayne Zabala MD        And    bisacodyl (DULCOLAX) EC tablet 5 mg  5 mg Oral Daily PRN Blayne Zabala MD        And    bisacodyl (DULCOLAX) suppository 10 mg  10 mg Rectal Daily PRN Blayne Zabala MD        carvedilol (COREG) tablet 12.5 mg  12.5 mg Oral BID With Meals Blayne Zabala MD   12.5 mg at 06/23/25 0753    clopidogrel (PLAVIX) tablet 75 mg  75 mg Oral Daily Blayne Zabala MD   75 mg at 06/22/25 0905    epoetin cecile-epbx (RETACRIT) injection 10,000 Units  10,000 Units Subcutaneous Once per day on Monday Wednesday Friday Blayne Zabala MD   10,000 Units at 06/20/25 2039    gabapentin (NEURONTIN) capsule 300 mg  300 mg Oral Nightly Blayne Zabala MD   300 mg at 06/22/25 2055    heparin (porcine) injection 3,200 Units  3,200 Units Intracatheter PRN Bolivar Rueda MD        [Held by provider] hydrALAZINE (APRESOLINE) tablet 100 mg  100 mg Oral TID Blayne Zabala MD   100 mg at 06/21/25 1941    isosorbide dinitrate (ISORDIL) tablet 10 mg  10 mg Oral TID - Nitrates Blayne Zabala MD   10 mg at 06/23/25 0759    levothyroxine (SYNTHROID, LEVOTHROID) tablet 100  mcg  100 mcg Oral Q AM Blayne Zabala MD   100 mcg at 06/23/25 0555    montelukast (SINGULAIR) tablet 10 mg  10 mg Oral Nightly Blayne Zabaal MD   10 mg at 06/22/25 2055    naloxone (NARCAN) injection 0.4 mg  0.4 mg Intravenous Q5 Min PRN Blayne Zabala MD        NIFEdipine XL (PROCARDIA XL) 24 hr tablet 120 mg  120 mg Oral Daily Blayne Zabala MD   120 mg at 06/22/25 1735    nitroglycerin (NITROSTAT) SL tablet 0.4 mg  0.4 mg Sublingual Q5 Min PRN Blayne Zabala MD        oxyCODONE-acetaminophen (PERCOCET) 5-325 MG per tablet 1 tablet  1 tablet Oral Q6H PRN Blayne Zabala MD   1 tablet at 06/23/25 0623    promethazine (PHENERGAN) tablet 12.5 mg  12.5 mg Oral Q6H PRN Donal Rodriguez MD   12.5 mg at 06/20/25 2243    sodium chloride 0.9 % flush 10 mL  10 mL Intravenous Q12H Blayne Zabala MD   10 mL at 06/22/25 2100    sodium chloride 0.9 % flush 10 mL  10 mL Intravenous PRN Blayne Zabala MD        sodium chloride 0.9 % flush 10 mL  10 mL Intravenous Q12H Blayne Zabala MD   10 mL at 06/22/25 2100    sodium chloride 0.9 % flush 10 mL  10 mL Intravenous PRN Blayne Zabala MD        sodium chloride 0.9 % infusion 40 mL  40 mL Intravenous PRN Blayne Zabala MD        sodium chloride 0.9 % infusion 40 mL  40 mL Intravenous PRN Blayne Zabala MD        spironolactone (ALDACTONE) tablet 25 mg  25 mg Oral Daily Blayne Zabala MD   25 mg at 06/22/25 0905    tamsulosin (FLOMAX) 24 hr capsule 0.4 mg  0.4 mg Oral Nightly Blayne Zabala MD   0.4 mg at 06/22/25 2056    valsartan (DIOVAN) tablet 320 mg  320 mg Oral Daily Blayne Zabala MD   320 mg at 06/22/25 0905       Past Medical History:  Past Medical History:   Diagnosis Date    3-vessel CAD 08/11/2020    Allergic rhinitis     Anemia     Anxiety disorder 04/27/2020    Arthritis     Asymmetrical sensorineural hearing loss 06/28/2017    Atherosclerosis of native artery of both lower extremities with intermittent claudication 07/18/2019     Avascular necrosis of femoral head, left 07/11/2020    right hip after surgery    Carotid stenosis     Chronic mucoid otitis media     Chronic rhinitis     COPD (chronic obstructive pulmonary disease)     Coronary artery disease     HEART BYPASS 2004    Crohn's disease of large intestine with other complication 07/30/2020    Chronic diarrhea Colonoscopy July 2020 revealed mild patchy scattered hemosiderin staining with inflammation more so in rectosigmoid area.  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    Iron deficiency anemia 07/14/2020    Mixed hearing loss of left ear     PAD (peripheral artery disease) 08/11/2020    Perianal abscess     Pernicious anemia 08/17/2020    took shots but never diagnosed with b12 deficiency    Personal history of alcoholism 08/11/2020    quit drinking in 2013    Prostatic hypertrophy 08/11/2020    Sensorineural hearing loss     Sepsis with acute renal failure 09/15/2020    Shortness of breath 05/27/2021    Tinnitus     Ventricular tachycardia, nonsustained 07/14/2020    Weight loss 07/11/2020       Past Surgical History:  Past Surgical History:   Procedure Laterality Date    AORTOGRAM Right 4/25/2025    Procedure: RIGHT LOWER EXTREMITY ANGIOGRAM, SHOCKWAVE LITHOTRIPSY, BALLOON ANGIOPLASTY, MYNX CLOSURE;  Surgeon: Gil Pineda DO;  Location: [facility] HYBRID OR;  Service: Vascular;  Laterality: Right;    AORTOGRAM Left 5/22/2025    Procedure: LEFT LOWER EXTREMITY ANGIOGRAM, SHOCKWAVE LITHOTRIPSY, BALLOON ANGIOPLASTY, STENT PLACEMENT, MYNX CLOSURE;  Surgeon: Blayne Zabala MD;  Location: [facility] HYBRID OR;  Service:  Vascular;  Laterality: Left;    AORTOGRAM Right 5/30/2025    Procedure: AORTOILIAC ANGIOGRAM WITH LEFT LOWER EXTREMITY ANGIOGRAM;  Surgeon: Gil Pineda DO;  Location: [facility] HYBRID OR;  Service: Vascular;  Laterality: Right;    AORTOGRAM Right 6/20/2025    Procedure: right lower extremity arteriogram, shockwave lithotripsy, balloon angioplasty, mynx closue;  Surgeon: Blayne Zabala MD;  Location: [facility] HYBRID OR;  Service: Vascular;  Laterality: Right;    ARTERY SURGERY  2021    right side on neck    CAROTID ENDARTERECTOMY Right 05/10/2021    Procedure: RIGHT CAROTID ENDARTERECTOMY WITH EEG;  Surgeon: Gil Pineda DO;  Location: [facility] HYBRID OR 12;  Service: Vascular;  Laterality: Right;    COLONOSCOPY N/A 07/02/2020    Procedure: COLONOSCOPY WITH ANESTHESIA;  Surgeon: Adrien Brewster MD;  Location: [facility] ENDOSCOPY;  Service: Gastroenterology;  Laterality: N/A;  pre op: diarrhea  post op: polyps  PCP: Joe Velasco MD    COLONOSCOPY N/A 10/13/2020    Procedure: COLONOSCOPY WITH ANESTHESIA;  Surgeon: Adrien Brewster MD;  Location: [facility] ENDOSCOPY;  Service: Gastroenterology;  Laterality: N/A;  Pre: Chronic Diarrhea, Crohn's  Post: AVM  Dr. Neftali Velasco  CO2 Inflation Used    COLONOSCOPY N/A 12/08/2023    Procedure: COLONOSCOPY WITH ANESTHESIA;  Surgeon: Adrien Brewster MD;  Location: [facility] ENDOSCOPY;  Service: Gastroenterology;  Laterality: N/A;  pre chrone's disease  post sub optimal prep, polyp, chrone's      CORONARY ARTERY BYPASS GRAFT  2003    x3    ENDOSCOPY N/A 11/02/2021    Procedure: ESOPHAGOGASTRODUODENOSCOPY WITH ANESTHESIA;  Surgeon: Bridger Bell MD;  Location: [facility] ENDOSCOPY;  Service: Gastroenterology;  Laterality: N/A;  pre anemia;gi bleed  post  gi bleed;schatski ring  Dr. ERIC Velasco    ENDOSCOPY N/A 10/10/2023    Procedure: ESOPHAGOGASTRODUODENOSCOPY WITH ANESTHESIA;  Surgeon: Adrien Brewster MD;  Location: [facility] ENDOSCOPY;  Service:  Gastroenterology;  Laterality: N/A;  preop; anemia  postop esophagitis ; R/O barretts   PCP Randall Beata    EYE SURGERY Bilateral     catorac    INCISION AND DRAINAGE PERIRECTAL ABSCESS N/A 2017    Procedure: INCISION AND DRAINAGE OF JEET ANAL ABSCESS;  Surgeon: Lynette Smith MD;  Location:  [facility] OR;  Service:     INGUINAL HERNIA REPAIR Bilateral 2023    Procedure: INGUINAL HERNIA BILATERAL REPAIR LAPAROSCOPIC WITH DAVINCI ROBOT WITH MESH;  Surgeon: Tahira Rivera MD;  Location:  [facility] OR;  Service: Robotics - DaVinci;  Laterality: Bilateral;    INSERTION HEMODIALYSIS CATHETER N/A 2025    Procedure: HEMODIALYSIS CATHETER PLACEMENT;  Surgeon: Gil Pineda DO;  Location:  [facility] HYBRID OR;  Service: Vascular;  Laterality: N/A;    MYRINGOTOMY W/ TUBES Left 2017    06/10/2016    TONSILLECTOMY      TOTAL HIP ARTHROPLASTY Right        Family History  Family History   Problem Relation Age of Onset    Breast cancer Mother     Dementia Father     Glaucoma Father     No Known Problems Daughter     Colon polyps Neg Hx     Colon cancer Neg Hx        Social History  Social History     Socioeconomic History    Marital status:    Tobacco Use    Smoking status: Former     Current packs/day: 0.00     Average packs/day: 0.5 packs/day for 25.8 years (12.9 ttl pk-yrs)     Types: Cigarettes     Start date:      Quit date: 10/13/2013     Years since quittin.7     Passive exposure: Past    Smokeless tobacco: Never    Tobacco comments:     quit    Vaping Use    Vaping status: Former    Substances: Nicotine    Devices: Pre-filled or refillable cartridge   Substance and Sexual Activity    Alcohol use: Not Currently    Drug use: No    Sexual activity: Not Currently     Partners: Female       Review of Systems:  History obtained from chart review and the patient  General ROS: No fever or chills  Respiratory ROS: No cough, shortness of breath, wheezing  Cardiovascular ROS: No  chest pain or palpitations  Gastrointestinal ROS: No abdominal pain or melena  Genito-Urinary ROS: No dysuria or hematuria  Psych ROS: No anxiety and depression  14 point ROS reviewed with the patient and negative except as noted above and in the HPI unless unable to obtain.    Objective:  Patient Vitals for the past 24 hrs:   BP Temp Temp src Pulse Resp SpO2   06/23/25 0735 132/58 97.4 °F (36.3 °C) Oral 61 16 96 %   06/23/25 0623 150/52 -- -- -- -- --   06/23/25 0405 130/44 97.4 °F (36.3 °C) Oral 59 16 100 %   06/23/25 0116 -- -- -- -- -- 100 %   06/23/25 0112 -- -- -- -- -- (!) 87 %   06/22/25 2328 139/46 98.1 °F (36.7 °C) Oral 66 16 97 %   06/22/25 1945 105/46 98.3 °F (36.8 °C) Oral 64 16 97 %   06/22/25 1656 112/55 98.2 °F (36.8 °C) Oral 55 18 99 %   06/22/25 1620 155/62 98.2 °F (36.8 °C) Oral 60 14 99 %       Intake/Output Summary (Last 24 hours) at 6/23/2025 0950  Last data filed at 6/22/2025 1200  Gross per 24 hour   Intake --   Output 400 ml   Net -400 ml     General: awake/alert   Chest:  clear to auscultation bilaterally without respiratory distress  CVS: regular rate and rhythm  Abdominal: soft, nontender, positive bowel sounds  Extremities: no cyanosis or edema  Skin: warm and dry without rash      Labs:  Results from last 7 days   Lab Units 06/23/25  0339 06/22/25  0510 06/21/25  0620   WBC 10*3/mm3 6.84 8.02 5.79   HEMOGLOBIN g/dL 7.6* 8.7* 7.5*   HEMATOCRIT % 23.8* 26.8* 23.0*   PLATELETS 10*3/mm3 125* 119* 100*         Results from last 7 days   Lab Units 06/23/25  0339 06/22/25  0510 06/21/25  0620   SODIUM mmol/L 135* 136 140   POTASSIUM mmol/L 3.7 3.7 3.6   CHLORIDE mmol/L 101 102 104   CO2 mmol/L 22.0 22.0 22.0   BUN mg/dL 44.4* 37.5* 23.3*   CREATININE mg/dL 3.81* 3.17* 2.06*   CALCIUM mg/dL 8.6 9.2 8.2*   EGFR mL/min/1.73 15.6* 19.4* 32.6*   BILIRUBIN mg/dL 0.3 0.4 0.4   ALK PHOS U/L 144* 174* 132*   ALT (SGPT) U/L <5 <5 <5   AST (SGOT) U/L 12 16 13   GLUCOSE mg/dL 99 107* 84       Radiology:  "  Imaging Results (Last 72 Hours)       Procedure Component Value Units Date/Time    IR Angiogram Extremity - In process [[id number]] Resulted: 06/20/25 1525     Updated: 06/20/25 1710    This result has not been signed. Information might be incomplete.      FL C Arm During Surgery [[id number]] Resulted: 06/20/25 1710     Updated: 06/20/25 1710    Narrative:      This procedure was auto-finalized with no dictation required.            Culture:  No results found for: \"BLOODCX\", \"URINECX\", \"WOUNDCX\", \"MRSACX\", \"RESPCX\", \"STOOLCX\"      Assessment    End stage renal disease on hemodialysis  Hypertension  Anemia of CKD  Severe peripheral vascular disease--s/p multiple vascular procedures, most recent on 6/28.  Chronic diastolic CHF    Plan:   Routine HD today  Monitor labs      STERLING Sethi  6/23/2025  09:50 CDT    "

## 2025-06-23 NOTE — CASE MANAGEMENT/SOCIAL WORK
Continued Stay Note   Nino     Patient Name: Ricardo Hugo  MRN: [id number]  Today's Date: 6/23/2025    Admit Date: 6/19/2025    Plan: [facility]   Discharge Plan       Row Name 06/23/25 1432       Plan    Plan [facility]    Patient/Family in Agreement with Plan yes    Plan Comments Patient has a bed available at [facility]. Will follow for d/c.                   Discharge Codes    No documentation.                 Expected Discharge Date and Time       Expected Discharge Date Expected Discharge Time    Jun 21, 2025               LINNETTE Ramos

## 2025-06-24 ENCOUNTER — ANESTHESIA EVENT (OUTPATIENT)
Dept: PERIOP | Facility: HOSPITAL | Age: 75
DRG: 278 | End: 2025-06-24
Payer: MEDICARE

## 2025-06-24 ENCOUNTER — ANESTHESIA (OUTPATIENT)
Dept: PERIOP | Facility: HOSPITAL | Age: 75
DRG: 278 | End: 2025-06-24
Payer: MEDICARE

## 2025-06-24 LAB
ANION GAP SERPL CALCULATED.3IONS-SCNC: 11 MMOL/L (ref 5–15)
BASOPHILS # BLD AUTO: 0.05 10*3/MM3 (ref 0–0.2)
BASOPHILS NFR BLD AUTO: 0.6 % (ref 0–1.5)
BUN SERPL-MCNC: 24.2 MG/DL (ref 8–23)
BUN/CREAT SERPL: 10.8 (ref 7–25)
CALCIUM SPEC-SCNC: 8.6 MG/DL (ref 8.6–10.5)
CHLORIDE SERPL-SCNC: 105 MMOL/L (ref 98–107)
CO2 SERPL-SCNC: 21 MMOL/L (ref 22–29)
CREAT SERPL-MCNC: 2.25 MG/DL (ref 0.76–1.27)
DEPRECATED RDW RBC AUTO: 65.1 FL (ref 37–54)
EGFRCR SERPLBLD CKD-EPI 2021: 29.3 ML/MIN/1.73
EOSINOPHIL # BLD AUTO: 0.37 10*3/MM3 (ref 0–0.4)
EOSINOPHIL NFR BLD AUTO: 4.7 % (ref 0.3–6.2)
ERYTHROCYTE [DISTWIDTH] IN BLOOD BY AUTOMATED COUNT: 17.3 % (ref 12.3–15.4)
GLUCOSE SERPL-MCNC: 103 MG/DL (ref 65–99)
HCT VFR BLD AUTO: 23.6 % (ref 37.5–51)
HGB BLD-MCNC: 7.5 G/DL (ref 13–17.7)
IMM GRANULOCYTES # BLD AUTO: 0.09 10*3/MM3 (ref 0–0.05)
IMM GRANULOCYTES NFR BLD AUTO: 1.1 % (ref 0–0.5)
LYMPHOCYTES # BLD AUTO: 1.09 10*3/MM3 (ref 0.7–3.1)
LYMPHOCYTES NFR BLD AUTO: 13.8 % (ref 19.6–45.3)
MCH RBC QN AUTO: 33 PG (ref 26.6–33)
MCHC RBC AUTO-ENTMCNC: 31.8 G/DL (ref 31.5–35.7)
MCV RBC AUTO: 104 FL (ref 79–97)
MONOCYTES # BLD AUTO: 0.92 10*3/MM3 (ref 0.1–0.9)
MONOCYTES NFR BLD AUTO: 11.7 % (ref 5–12)
NEUTROPHILS NFR BLD AUTO: 5.36 10*3/MM3 (ref 1.7–7)
NEUTROPHILS NFR BLD AUTO: 68.1 % (ref 42.7–76)
NRBC BLD AUTO-RTO: 0 /100 WBC (ref 0–0.2)
PLATELET # BLD AUTO: 107 10*3/MM3 (ref 140–450)
PMV BLD AUTO: 9.8 FL (ref 6–12)
POTASSIUM SERPL-SCNC: 4 MMOL/L (ref 3.5–5.2)
RBC # BLD AUTO: 2.27 10*6/MM3 (ref 4.14–5.8)
SODIUM SERPL-SCNC: 137 MMOL/L (ref 136–145)
WBC NRBC COR # BLD AUTO: 7.88 10*3/MM3 (ref 3.4–10.8)

## 2025-06-24 PROCEDURE — 25010000002 FENTANYL CITRATE (PF) 50 MCG/ML SOLUTION: Performed by: ANESTHESIOLOGY

## 2025-06-24 PROCEDURE — 85025 COMPLETE CBC W/AUTO DIFF WBC: CPT | Performed by: NURSE PRACTITIONER

## 2025-06-24 PROCEDURE — 88307 TISSUE EXAM BY PATHOLOGIST: CPT | Performed by: STUDENT IN AN ORGANIZED HEALTH CARE EDUCATION/TRAINING PROGRAM

## 2025-06-24 PROCEDURE — 27590 AMPUTATE LEG AT THIGH: CPT | Performed by: STUDENT IN AN ORGANIZED HEALTH CARE EDUCATION/TRAINING PROGRAM

## 2025-06-24 PROCEDURE — 86900 BLOOD TYPING SEROLOGIC ABO: CPT

## 2025-06-24 PROCEDURE — 80048 BASIC METABOLIC PNL TOTAL CA: CPT | Performed by: NURSE PRACTITIONER

## 2025-06-24 PROCEDURE — 25010000002 GLYCOPYRROLATE 0.4 MG/2ML SOLUTION

## 2025-06-24 PROCEDURE — 25010000002 MORPHINE PER 10 MG

## 2025-06-24 PROCEDURE — 25010000002 FENTANYL CITRATE (PF) 100 MCG/2ML SOLUTION: Performed by: NURSE ANESTHETIST, CERTIFIED REGISTERED

## 2025-06-24 PROCEDURE — P9016 RBC LEUKOCYTES REDUCED: HCPCS

## 2025-06-24 PROCEDURE — 25010000002 PROPOFOL 10 MG/ML EMULSION: Performed by: NURSE ANESTHETIST, CERTIFIED REGISTERED

## 2025-06-24 PROCEDURE — 25010000002 HYDROMORPHONE PER 4 MG: Performed by: ANESTHESIOLOGY

## 2025-06-24 PROCEDURE — 88311 DECALCIFY TISSUE: CPT | Performed by: STUDENT IN AN ORGANIZED HEALTH CARE EDUCATION/TRAINING PROGRAM

## 2025-06-24 PROCEDURE — 25010000002 CEFAZOLIN PER 500 MG: Performed by: STUDENT IN AN ORGANIZED HEALTH CARE EDUCATION/TRAINING PROGRAM

## 2025-06-24 PROCEDURE — 25810000003 SODIUM CHLORIDE 0.9 % SOLUTION: Performed by: NURSE ANESTHETIST, CERTIFIED REGISTERED

## 2025-06-24 PROCEDURE — 36430 TRANSFUSION BLD/BLD COMPNT: CPT

## 2025-06-24 PROCEDURE — 25010000002 LIDOCAINE PF 2% 2 % SOLUTION: Performed by: NURSE ANESTHETIST, CERTIFIED REGISTERED

## 2025-06-24 PROCEDURE — 25010000002 VASOPRESSIN 20 UNIT/ML SOLUTION

## 2025-06-24 DEVICE — LIGACLIP MCA MULTIPLE CLIP APPLIERS, 20 SMALL CLIPS
Type: IMPLANTABLE DEVICE | Site: FEMUR | Status: FUNCTIONAL
Brand: LIGACLIP

## 2025-06-24 DEVICE — LIGACLIP MCA MULTIPLE CLIP APPLIERS, 20 MEDIUM CLIPS
Type: IMPLANTABLE DEVICE | Site: FEMUR | Status: FUNCTIONAL
Brand: LIGACLIP

## 2025-06-24 RX ORDER — SODIUM CHLORIDE 9 MG/ML
500 INJECTION, SOLUTION INTRAVENOUS CONTINUOUS
Status: DISCONTINUED | OUTPATIENT
Start: 2025-06-24 | End: 2025-06-24

## 2025-06-24 RX ORDER — EPHEDRINE SULFATE 50 MG/ML
INJECTION INTRAVENOUS AS NEEDED
Status: DISCONTINUED | OUTPATIENT
Start: 2025-06-24 | End: 2025-06-24 | Stop reason: SURG

## 2025-06-24 RX ORDER — SODIUM CHLORIDE 0.9 % (FLUSH) 0.9 %
10 SYRINGE (ML) INJECTION AS NEEDED
Status: DISCONTINUED | OUTPATIENT
Start: 2025-06-24 | End: 2025-06-24 | Stop reason: HOSPADM

## 2025-06-24 RX ORDER — FENTANYL CITRATE 50 UG/ML
50 INJECTION, SOLUTION INTRAMUSCULAR; INTRAVENOUS
Status: DISCONTINUED | OUTPATIENT
Start: 2025-06-24 | End: 2025-06-24 | Stop reason: HOSPADM

## 2025-06-24 RX ORDER — OXYCODONE AND ACETAMINOPHEN 10; 325 MG/1; MG/1
1 TABLET ORAL EVERY 4 HOURS PRN
Status: DISCONTINUED | OUTPATIENT
Start: 2025-06-24 | End: 2025-06-24 | Stop reason: HOSPADM

## 2025-06-24 RX ORDER — IBUPROFEN 600 MG/1
600 TABLET, FILM COATED ORAL EVERY 6 HOURS PRN
Status: DISCONTINUED | OUTPATIENT
Start: 2025-06-24 | End: 2025-06-24 | Stop reason: HOSPADM

## 2025-06-24 RX ORDER — SODIUM CHLORIDE 0.9 % (FLUSH) 0.9 %
10 SYRINGE (ML) INJECTION EVERY 12 HOURS SCHEDULED
Status: DISCONTINUED | OUTPATIENT
Start: 2025-06-24 | End: 2025-06-24 | Stop reason: HOSPADM

## 2025-06-24 RX ORDER — SODIUM CHLORIDE 0.9 % (FLUSH) 0.9 %
3 SYRINGE (ML) INJECTION AS NEEDED
Status: DISCONTINUED | OUTPATIENT
Start: 2025-06-24 | End: 2025-06-24 | Stop reason: HOSPADM

## 2025-06-24 RX ORDER — NEOSTIGMINE METHYLSULFATE 5 MG/5 ML
SYRINGE (ML) INTRAVENOUS AS NEEDED
Status: DISCONTINUED | OUTPATIENT
Start: 2025-06-24 | End: 2025-06-24 | Stop reason: SURG

## 2025-06-24 RX ORDER — FENTANYL CITRATE 50 UG/ML
INJECTION, SOLUTION INTRAMUSCULAR; INTRAVENOUS AS NEEDED
Status: DISCONTINUED | OUTPATIENT
Start: 2025-06-24 | End: 2025-06-24 | Stop reason: SURG

## 2025-06-24 RX ORDER — ONDANSETRON 2 MG/ML
4 INJECTION INTRAMUSCULAR; INTRAVENOUS
Status: DISCONTINUED | OUTPATIENT
Start: 2025-06-24 | End: 2025-06-24

## 2025-06-24 RX ORDER — FENTANYL CITRATE 50 UG/ML
25 INJECTION, SOLUTION INTRAMUSCULAR; INTRAVENOUS
Status: DISCONTINUED | OUTPATIENT
Start: 2025-06-24 | End: 2025-06-24 | Stop reason: HOSPADM

## 2025-06-24 RX ORDER — LIDOCAINE HYDROCHLORIDE 10 MG/ML
0.5 INJECTION, SOLUTION EPIDURAL; INFILTRATION; INTRACAUDAL; PERINEURAL ONCE AS NEEDED
Status: DISCONTINUED | OUTPATIENT
Start: 2025-06-24 | End: 2025-06-24 | Stop reason: HOSPADM

## 2025-06-24 RX ORDER — DEXTROSE MONOHYDRATE 25 G/50ML
12.5 INJECTION, SOLUTION INTRAVENOUS AS NEEDED
Status: DISCONTINUED | OUTPATIENT
Start: 2025-06-24 | End: 2025-06-24 | Stop reason: HOSPADM

## 2025-06-24 RX ORDER — SODIUM CHLORIDE 9 MG/ML
INJECTION, SOLUTION INTRAVENOUS CONTINUOUS PRN
Status: DISCONTINUED | OUTPATIENT
Start: 2025-06-24 | End: 2025-06-24 | Stop reason: SURG

## 2025-06-24 RX ORDER — GLYCOPYRROLATE 0.2 MG/ML
INJECTION INTRAMUSCULAR; INTRAVENOUS AS NEEDED
Status: DISCONTINUED | OUTPATIENT
Start: 2025-06-24 | End: 2025-06-24 | Stop reason: SURG

## 2025-06-24 RX ORDER — FLUMAZENIL 0.1 MG/ML
0.2 INJECTION INTRAVENOUS AS NEEDED
Status: DISCONTINUED | OUTPATIENT
Start: 2025-06-24 | End: 2025-06-24 | Stop reason: HOSPADM

## 2025-06-24 RX ORDER — PROPOFOL 10 MG/ML
VIAL (ML) INTRAVENOUS AS NEEDED
Status: DISCONTINUED | OUTPATIENT
Start: 2025-06-24 | End: 2025-06-24 | Stop reason: SURG

## 2025-06-24 RX ORDER — LIDOCAINE HYDROCHLORIDE 20 MG/ML
INJECTION, SOLUTION EPIDURAL; INFILTRATION; INTRACAUDAL; PERINEURAL AS NEEDED
Status: DISCONTINUED | OUTPATIENT
Start: 2025-06-24 | End: 2025-06-24 | Stop reason: SURG

## 2025-06-24 RX ORDER — LABETALOL HYDROCHLORIDE 5 MG/ML
5 INJECTION, SOLUTION INTRAVENOUS
Status: DISCONTINUED | OUTPATIENT
Start: 2025-06-24 | End: 2025-06-24 | Stop reason: HOSPADM

## 2025-06-24 RX ORDER — NALOXONE HCL 0.4 MG/ML
0.04 VIAL (ML) INJECTION AS NEEDED
Status: DISCONTINUED | OUTPATIENT
Start: 2025-06-24 | End: 2025-06-24 | Stop reason: HOSPADM

## 2025-06-24 RX ORDER — ROCURONIUM BROMIDE 10 MG/ML
INJECTION, SOLUTION INTRAVENOUS AS NEEDED
Status: DISCONTINUED | OUTPATIENT
Start: 2025-06-24 | End: 2025-06-24 | Stop reason: SURG

## 2025-06-24 RX ORDER — HYDROMORPHONE HYDROCHLORIDE 1 MG/ML
0.5 INJECTION, SOLUTION INTRAMUSCULAR; INTRAVENOUS; SUBCUTANEOUS
Status: DISCONTINUED | OUTPATIENT
Start: 2025-06-24 | End: 2025-06-24 | Stop reason: HOSPADM

## 2025-06-24 RX ORDER — MORPHINE SULFATE 2 MG/ML
1 INJECTION, SOLUTION INTRAMUSCULAR; INTRAVENOUS ONCE AS NEEDED
Status: COMPLETED | OUTPATIENT
Start: 2025-06-24 | End: 2025-06-24

## 2025-06-24 RX ADMIN — FENTANYL CITRATE 25 MCG: 50 INJECTION, SOLUTION INTRAMUSCULAR; INTRAVENOUS at 12:08

## 2025-06-24 RX ADMIN — CLOPIDOGREL BISULFATE 75 MG: 75 TABLET, FILM COATED ORAL at 08:04

## 2025-06-24 RX ADMIN — SODIUM CHLORIDE: 9 INJECTION, SOLUTION INTRAVENOUS at 13:59

## 2025-06-24 RX ADMIN — Medication 3 MG: at 13:59

## 2025-06-24 RX ADMIN — OXYCODONE AND ACETAMINOPHEN 1 TABLET: 325; 10 TABLET ORAL at 14:50

## 2025-06-24 RX ADMIN — MONTELUKAST SODIUM 10 MG: 10 TABLET, COATED ORAL at 20:38

## 2025-06-24 RX ADMIN — Medication 10 ML: at 20:38

## 2025-06-24 RX ADMIN — LIDOCAINE HYDROCHLORIDE 100 MG: 20 INJECTION, SOLUTION EPIDURAL; INFILTRATION; INTRACAUDAL; PERINEURAL at 12:39

## 2025-06-24 RX ADMIN — PROPOFOL 150 MG: 10 INJECTION, EMULSION INTRAVENOUS at 12:39

## 2025-06-24 RX ADMIN — FENTANYL CITRATE 100 MCG: 50 INJECTION, SOLUTION INTRAMUSCULAR; INTRAVENOUS at 12:39

## 2025-06-24 RX ADMIN — ISOSORBIDE DINITRATE 10 MG: 10 TABLET ORAL at 18:12

## 2025-06-24 RX ADMIN — DOCUSATE SODIUM 50 MG AND SENNOSIDES 8.6 MG 2 TABLET: 8.6; 5 TABLET, FILM COATED ORAL at 20:38

## 2025-06-24 RX ADMIN — ATORVASTATIN CALCIUM 10 MG: 10 TABLET, FILM COATED ORAL at 20:38

## 2025-06-24 RX ADMIN — OXYCODONE AND ACETAMINOPHEN 1 TABLET: 5; 325 TABLET ORAL at 08:03

## 2025-06-24 RX ADMIN — DOCUSATE SODIUM 50 MG AND SENNOSIDES 8.6 MG 2 TABLET: 8.6; 5 TABLET, FILM COATED ORAL at 08:04

## 2025-06-24 RX ADMIN — ISOSORBIDE DINITRATE 10 MG: 10 TABLET ORAL at 08:03

## 2025-06-24 RX ADMIN — OXYCODONE AND ACETAMINOPHEN 1 TABLET: 5; 325 TABLET ORAL at 20:38

## 2025-06-24 RX ADMIN — GABAPENTIN 300 MG: 300 CAPSULE ORAL at 20:38

## 2025-06-24 RX ADMIN — ASPIRIN 81 MG: 81 TABLET, COATED ORAL at 08:03

## 2025-06-24 RX ADMIN — HYDROMORPHONE HYDROCHLORIDE 0.5 MG: 1 INJECTION, SOLUTION INTRAMUSCULAR; INTRAVENOUS; SUBCUTANEOUS at 14:29

## 2025-06-24 RX ADMIN — ROCURONIUM BROMIDE 30 MG: 10 INJECTION, SOLUTION INTRAVENOUS at 12:39

## 2025-06-24 RX ADMIN — SODIUM CHLORIDE: 9 INJECTION, SOLUTION INTRAVENOUS at 12:39

## 2025-06-24 RX ADMIN — EPHEDRINE SULFATE 10 MG: 50 INJECTION INTRAVENOUS at 12:48

## 2025-06-24 RX ADMIN — OXYCODONE AND ACETAMINOPHEN 1 TABLET: 5; 325 TABLET ORAL at 16:26

## 2025-06-24 RX ADMIN — OXYCODONE AND ACETAMINOPHEN 1 TABLET: 5; 325 TABLET ORAL at 02:24

## 2025-06-24 RX ADMIN — FENTANYL CITRATE 50 MCG: 50 INJECTION, SOLUTION INTRAMUSCULAR; INTRAVENOUS at 14:35

## 2025-06-24 RX ADMIN — TAMSULOSIN HYDROCHLORIDE 0.4 MG: 0.4 CAPSULE ORAL at 20:38

## 2025-06-24 RX ADMIN — CARVEDILOL 12.5 MG: 6.25 TABLET, FILM COATED ORAL at 08:04

## 2025-06-24 RX ADMIN — CEFAZOLIN 2000 MG: 2 INJECTION, POWDER, FOR SOLUTION INTRAMUSCULAR; INTRAVENOUS at 12:44

## 2025-06-24 RX ADMIN — MORPHINE SULFATE 1 MG: 2 INJECTION, SOLUTION INTRAMUSCULAR; INTRAVENOUS at 21:40

## 2025-06-24 RX ADMIN — Medication 10 ML: at 08:05

## 2025-06-24 RX ADMIN — GLYCOPYRROLATE 0.4 MG: 0.2 INJECTION INTRAMUSCULAR; INTRAVENOUS at 13:59

## 2025-06-24 RX ADMIN — CARVEDILOL 12.5 MG: 6.25 TABLET, FILM COATED ORAL at 18:12

## 2025-06-24 RX ADMIN — NIFEDIPINE 120 MG: 60 TABLET, FILM COATED, EXTENDED RELEASE ORAL at 18:12

## 2025-06-24 NOTE — ANESTHESIA PROCEDURE NOTES
Airway  Reason: elective    Date/Time: 6/24/2025 12:40 PM  Airway not difficult    General Information and Staff    Patient location during procedure: OR  CRNA/CAA: Cleve Kraus CRNA    Indications and Patient Condition  Indications for airway management: airway protection    Preoxygenated: yes    Mask difficulty assessment: 1 - vent by mask    Final Airway Details    Final airway type: endotracheal airway      Successful airway: ETT  Cuffed: yes   Successful intubation technique: direct laryngoscopy  Adjuncts used in placement: intubating stylet  Endotracheal tube insertion site: oral  Blade: Mckenzie  Blade size: 2  ETT size (mm): 7.0  Cormack-Lehane Classification: grade I - full view of glottis  Placement verified by: capnometry   Cuff volume (mL): 7  Measured from: lips  ETT/EBT  to lips (cm): 22  Number of attempts at approach: 1  Assessment: lips, teeth, and gum same as pre-op and atraumatic intubation

## 2025-06-24 NOTE — ANESTHESIA POSTPROCEDURE EVALUATION
"Patient: Ricardo Hugo    Procedure Summary       Date: 06/24/25 Room / Location: [facility] OR  /  [facility] OR    Anesthesia Start: 1234 Anesthesia Stop: 1415    Procedure: right above knee amputation (Right: Thigh) Diagnosis:       Vascular occlusion      (Vascular occlusion [I99.8])    Surgeons: Blayne Zabala MD Provider: Danny Dejesus CRNA    Anesthesia Type: general ASA Status: 4            Anesthesia Type: general    Vitals  Vitals Value Taken Time   /55 06/24/25 15:03   Temp 98.9 °F (37.2 °C) 06/24/25 15:00   Pulse 60 06/24/25 15:03   Resp 16 06/24/25 15:00   SpO2 97 % 06/24/25 15:03   Vitals shown include unfiled device data.        Post Anesthesia Care and Evaluation    PONV Status: none  Comments: Patient d/c from PACU prior to anes eval based on Иван score.  Please see RN notes for details of d/c criteria.    Blood pressure 128/55, pulse 58, temperature 97.5 °F (36.4 °C), temperature source Oral, resp. rate 16, height 182.9 cm (72\"), weight 42.3 kg (93 lb 3.2 oz), SpO2 100%.        "

## 2025-06-24 NOTE — PROGRESS NOTES
Nephrology ([facility]) Progress Note      Patient:  Ricardo Hugo  YOB: 1950  Date of Service: 6/24/2025  MRN: [id number]   Acct: [id number]   Primary Care Physician: Nathan Zimmer MD  Advance Directive:   Code Status and Medical Interventions: CPR (Attempt to Resuscitate); Full Support   Ordered at: 06/19/25 1944     Code Status (Patient has no pulse and is not breathing):    CPR (Attempt to Resuscitate)     Medical Interventions (Patient has pulse or is breathing):    Full Support     Admit Date: 6/19/2025       Hospital Day: 5  Referring Provider: No Known Provider      Patient personally seen and examined.  Complete chart including Consults, Notes, Operative Reports, Labs, Cardiology, and Radiology studies reviewed as able.        Subjective:  Ricardo Hugo is a 75 y.o. male for whom we were consulted for evaluation and treatment of end-stage renal disease on maintenance dialysis. Patient has permacatheter as a dialysis access. He also has history of peripheral vascular disease, right carotid endarterectomy, PTA of the right external iliac artery, right SFA. Presenting to the emergency room complaining of right leg pain. His pain has progressively getting worse and has decided come to the emergency room. Patient is currently being evaluated by Dr. Zabala for another arteriogram, look at the status of his blood flow to the right leg. On June 28 patient underwent intravascular lithotripsy of popliteal artery, posterior tibial artery followed by balloon angioplasties. Tolerating HD treatments well    Today is awake and alert. No new overnight issues. Patient has discussed options with vascular and is now scheduled to have right AKA today.      Allergies:  Ondansetron, Zofran [ondansetron hcl], Lortab [hydrocodone-acetaminophen], and Allopurinol    Home Meds:  Medications Prior to Admission   Medication Sig Dispense Refill Last Dose/Taking    ALPRAZolam (XANAX)  0.25 MG tablet Take 1 tablet by mouth Every Night.   Taking    aspirin (aspirin) 81 MG EC tablet Take 1 tablet by mouth Daily.   Taking    atorvastatin (LIPITOR) 10 MG tablet Take 1 tablet by mouth Daily. 90 tablet 3 Taking    Budeson-Glycopyrrol-Formoterol (Breztri Aerosphere) 160-9-4.8 MCG/ACT aerosol inhaler Inhale 2 puffs 2 (Two) Times a Day.   Taking    calcitriol (ROCALTROL) 0.5 MCG capsule Take 1 capsule by mouth Daily. 90 capsule 2 Taking    carvedilol (COREG) 12.5 MG tablet Take 1 tablet by mouth 2 (Two) Times a Day With Meals.   Taking    clopidogrel (PLAVIX) 75 MG tablet Take 1 tablet by mouth Daily.   Taking    docusate sodium (COLACE) 100 MG capsule Take 1 capsule by mouth 3 (Three) Times a Day As Needed for Constipation.   Taking As Needed    empagliflozin (Jardiance) 10 MG tablet tablet Take 1 tablet by mouth Daily.   Taking    esomeprazole (nexIUM) 20 MG capsule Take 1 capsule by mouth Every Morning Before Breakfast.   Taking    gabapentin (NEURONTIN) 300 MG capsule Take 1 capsule by mouth 2 (Two) Times a Day.   Taking    hydrALAZINE (APRESOLINE) 100 MG tablet Take 1 tablet by mouth 3 (Three) Times a Day.   Taking    iron polysaccharides (NIFEREX) 150 MG capsule Take 1 capsule by mouth Daily.   Taking    isosorbide dinitrate (ISORDIL) 10 MG tablet Take 1 tablet by mouth 3 (Three) Times a Day. 270 tablet 2 Taking    levothyroxine (Synthroid) 100 MCG tablet Take 1 tablet by mouth Every Morning. 90 tablet 2 Taking    magnesium chloride ER 64 MG DR tablet Take 1 tablet by mouth Daily.   Taking    montelukast (SINGULAIR) 10 MG tablet Take 1 tablet by mouth Every Night.   Taking    Multiple Vitamins-Minerals (PRESERVISION/LUTEIN) capsule Take 1 capsule by mouth 2 (two) times a day.   Taking    NIFEdipine XL (ADALAT CC) 60 MG 24 hr tablet Take 2 tablets by mouth Daily.   Taking    spironolactone (ALDACTONE) 25 MG tablet Take 1 tablet by mouth Daily.   Taking    tamsulosin (FLOMAX) 0.4 MG capsule 24 hr  capsule Take 1 capsule by mouth Every Night.   Taking    valsartan (DIOVAN) 320 MG tablet Take 1 tablet by mouth Daily.   Taking    vitamin D (ERGOCALCIFEROL) 1.25 MG (99716 UT) capsule capsule Take 1 capsule by mouth 1 (One) Time Per Week. Mondays   Taking    albuterol sulfate  (90 Base) MCG/ACT inhaler Inhale 2 puffs Every 6 (Six) Hours As Needed for Wheezing or Shortness of Air.       bisacodyl (DULCOLAX) 10 MG suppository Insert 1 suppository into the rectum Daily As Needed for Constipation.       desoximetasone (TOPICORT) 0.25 % cream Apply 1 Application topically to the appropriate area as directed 2 (Two) Times a Day As Needed for Irritation. irritation 60 g 0     fluticasone (FLONASE) 50 MCG/ACT nasal spray Administer 2 sprays into the nostril(s) as directed by provider Daily As Needed for Allergies.       naloxone (NARCAN) 4 MG/0.1ML nasal spray Call 911. Don't prime. [place] in 1 nostril for overdose. Repeat in 2-3 minutes in other nostril if no or minimal breathing/responsiveness. 2 each 0     polyethylene glycol (MIRALAX) 17 g packet Take 17 g by mouth Daily As Needed (constipation).       senna 8.6 MG tablet Take 2 tablets by mouth Daily As Needed for Constipation.       [DISCONTINUED] cloNIDine (CATAPRES) 0.3 MG tablet Take 1 tablet by mouth 3 times a day. (Patient not taking: Reported on 6/21/2025)   Not Taking    [DISCONTINUED] gabapentin (NEURONTIN) 300 MG capsule Take 1 capsule by mouth Every Night. 24 capsule 0     [DISCONTINUED] omeprazole (priLOSEC) 20 MG capsule Take 1 capsule by mouth Daily. (Patient not taking: Reported on 6/21/2025)   Not Taking    [DISCONTINUED] oxyCODONE-acetaminophen (PERCOCET) 5-325 MG per tablet Take 1 tablet by mouth Every 6 (Six) Hours As Needed for Moderate Pain. 24 tablet 0        Medicines:  Current Facility-Administered Medications   Medication Dose Route Frequency Provider Last Rate Last Admin    acetaminophen (TYLENOL) tablet 650 mg  650 mg Oral Q4H PRN  Blayne Zabala MD   650 mg at 06/23/25 1842    Or    acetaminophen (TYLENOL) 160 MG/5ML oral solution 650 mg  650 mg Oral Q4H PRN Blayne Zabala MD        Or    acetaminophen (TYLENOL) suppository 650 mg  650 mg Rectal Q4H PRN Blayne Zabala MD        aspirin EC tablet 81 mg  81 mg Oral Daily Blayne Zabala MD   81 mg at 06/24/25 0803    atorvastatin (LIPITOR) tablet 10 mg  10 mg Oral Nightly Blayne Zabala MD   10 mg at 06/23/25 2100    sennosides-docusate (PERICOLACE) 8.6-50 MG per tablet 2 tablet  2 tablet Oral BID Blayne Zabala MD   2 tablet at 06/24/25 0804    And    polyethylene glycol (MIRALAX) packet 17 g  17 g Oral Daily PRN Blayne Zabala MD        And    bisacodyl (DULCOLAX) EC tablet 5 mg  5 mg Oral Daily PRN Blayne Zabala MD        And    bisacodyl (DULCOLAX) suppository 10 mg  10 mg Rectal Daily PRN Blayne Zabala MD        carvedilol (COREG) tablet 12.5 mg  12.5 mg Oral BID With Meals Blayne Zabala MD   12.5 mg at 06/24/25 0804    clopidogrel (PLAVIX) tablet 75 mg  75 mg Oral Daily Blayne Zabala MD   75 mg at 06/24/25 0804    epoetin cecile-epbx (RETACRIT) injection 10,000 Units  10,000 Units Subcutaneous Once per day on Monday Wednesday Friday Blayne Zabala MD   10,000 Units at 06/23/25 1408    gabapentin (NEURONTIN) capsule 300 mg  300 mg Oral Nightly Blayne Zabala MD   300 mg at 06/23/25 2100    heparin (porcine) injection 3,200 Units  3,200 Units Intracatheter PRN Bolivar Rueda MD   3,200 Units at 06/23/25 1308    [Held by provider] hydrALAZINE (APRESOLINE) tablet 100 mg  100 mg Oral TID Blayne Zabala MD   100 mg at 06/21/25 1941    isosorbide dinitrate (ISORDIL) tablet 10 mg  10 mg Oral TID - Nitrates Blayne Zabala MD   10 mg at 06/24/25 0803    levothyroxine (SYNTHROID, LEVOTHROID) tablet 100 mcg  100 mcg Oral Q AM Blayne Zabala MD   100 mcg at 06/23/25 0555    montelukast (SINGULAIR) tablet 10 mg  10 mg Oral Nightly Blayne Zabala MD   10 mg  at 06/23/25 2100    naloxone (NARCAN) injection 0.4 mg  0.4 mg Intravenous Q5 Min PRN Blayne Zabala MD        NIFEdipine XL (PROCARDIA XL) 24 hr tablet 120 mg  120 mg Oral Daily Blayne Zabala MD   120 mg at 06/23/25 1823    nitroglycerin (NITROSTAT) SL tablet 0.4 mg  0.4 mg Sublingual Q5 Min PRN Blayne Zabala MD        oxyCODONE-acetaminophen (PERCOCET) 5-325 MG per tablet 1 tablet  1 tablet Oral Q4H PRN Dc Issa, DO   1 tablet at 06/24/25 0803    promethazine (PHENERGAN) tablet 12.5 mg  12.5 mg Oral Q6H PRN Donal Rodriguez MD   12.5 mg at 06/20/25 2243    sodium chloride 0.9 % flush 10 mL  10 mL Intravenous Q12H Blayne Zabala MD   10 mL at 06/24/25 0805    sodium chloride 0.9 % flush 10 mL  10 mL Intravenous PRN Blayne Zabala MD        sodium chloride 0.9 % flush 10 mL  10 mL Intravenous Q12H Blayne Zabala MD   10 mL at 06/24/25 0805    sodium chloride 0.9 % flush 10 mL  10 mL Intravenous PRN Blayne Zabala MD        sodium chloride 0.9 % infusion 40 mL  40 mL Intravenous PRN Blayne Zabala MD        sodium chloride 0.9 % infusion 40 mL  40 mL Intravenous PRN Blayne Zabala MD        [Held by provider] spironolactone (ALDACTONE) tablet 25 mg  25 mg Oral Daily Blayne Zabala MD   25 mg at 06/22/25 0905    tamsulosin (FLOMAX) 24 hr capsule 0.4 mg  0.4 mg Oral Nightly Blayne Zabala MD   0.4 mg at 06/23/25 2100    [Held by provider] valsartan (DIOVAN) tablet 320 mg  320 mg Oral Daily Blayne Zabala MD   320 mg at 06/22/25 0905       Past Medical History:  Past Medical History:   Diagnosis Date    3-vessel CAD 08/11/2020    Allergic rhinitis     Anemia     Anxiety disorder 04/27/2020    Arthritis     Asymmetrical sensorineural hearing loss 06/28/2017    Atherosclerosis of native artery of both lower extremities with intermittent claudication 07/18/2019    Avascular necrosis of femoral head, left 07/11/2020    right hip after surgery    Carotid stenosis     Chronic mucoid  otitis media     Chronic rhinitis     COPD (chronic obstructive pulmonary disease)     Coronary artery disease     HEART BYPASS 2004    Crohn's disease of large intestine with other complication 07/30/2020    Chronic diarrhea Colonoscopy July 2020 revealed mild patchy scattered hemosiderin staining with inflammation more so in rectosigmoid area.  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    Iron deficiency anemia 07/14/2020    Mixed hearing loss of left ear     PAD (peripheral artery disease) 08/11/2020    Perianal abscess     Pernicious anemia 08/17/2020    took shots but never diagnosed with b12 deficiency    Personal history of alcoholism 08/11/2020    quit drinking in 2013    Prostatic hypertrophy 08/11/2020    Sensorineural hearing loss     Sepsis with acute renal failure 09/15/2020    Shortness of breath 05/27/2021    Tinnitus     Ventricular tachycardia, nonsustained 07/14/2020    Weight loss 07/11/2020       Past Surgical History:  Past Surgical History:   Procedure Laterality Date    AORTOGRAM Right 4/25/2025    Procedure: RIGHT LOWER EXTREMITY ANGIOGRAM, SHOCKWAVE LITHOTRIPSY, BALLOON ANGIOPLASTY, MYNX CLOSURE;  Surgeon: Gil Pineda DO;  Location:  [facility] HYBRID OR;  Service: Vascular;  Laterality: Right;    AORTOGRAM Left 5/22/2025    Procedure: LEFT LOWER EXTREMITY ANGIOGRAM, SHOCKWAVE LITHOTRIPSY, BALLOON ANGIOPLASTY, STENT PLACEMENT, MYNX CLOSURE;  Surgeon: Blayne Zabala MD;  Location:  [facility]BR[facility] OR;  Service: Vascular;  Laterality: Left;    AORTOGRAM Right 5/30/2025    Procedure: AORTOILIAC ANGIOGRAM WITH LEFT LOWER EXTREMITY  ANGIOGRAM;  Surgeon: Gil Pineda DO;  Location: [facility] HYBRID OR;  Service: Vascular;  Laterality: Right;    AORTOGRAM Right 6/20/2025    Procedure: right lower extremity arteriogram, shockwave lithotripsy, balloon angioplasty, mynx closue;  Surgeon: Blayne Zabala MD;  Location: [facility] HYBRID OR;  Service: Vascular;  Laterality: Right;    ARTERY SURGERY  2021    right side on neck    CAROTID ENDARTERECTOMY Right 05/10/2021    Procedure: RIGHT CAROTID ENDARTERECTOMY WITH EEG;  Surgeon: Gil Pineda DO;  Location: [facility] HYBRID OR 12;  Service: Vascular;  Laterality: Right;    COLONOSCOPY N/A 07/02/2020    Procedure: COLONOSCOPY WITH ANESTHESIA;  Surgeon: Adrien Brewster MD;  Location: [facility] ENDOSCOPY;  Service: Gastroenterology;  Laterality: N/A;  pre op: diarrhea  post op: polyps  PCP: Joe Velasco MD    COLONOSCOPY N/A 10/13/2020    Procedure: COLONOSCOPY WITH ANESTHESIA;  Surgeon: Adrien Brewster MD;  Location: [facility] ENDOSCOPY;  Service: Gastroenterology;  Laterality: N/A;  Pre: Chronic Diarrhea, Crohn's  Post: AVM  Dr. Neftali Velasco  CO2 Inflation Used    COLONOSCOPY N/A 12/08/2023    Procedure: COLONOSCOPY WITH ANESTHESIA;  Surgeon: Adrien Brewster MD;  Location: [facility] ENDOSCOPY;  Service: Gastroenterology;  Laterality: N/A;  pre chrone's disease  post sub optimal prep, polyp, chrone's      CORONARY ARTERY BYPASS GRAFT  2003    x3    ENDOSCOPY N/A 11/02/2021    Procedure: ESOPHAGOGASTRODUODENOSCOPY WITH ANESTHESIA;  Surgeon: Bridger Bell MD;  Location: [facility] ENDOSCOPY;  Service: Gastroenterology;  Laterality: N/A;  pre anemia;gi bleed  post  gi bleed;schatski ring  Dr. ERIC Velasco    ENDOSCOPY N/A 10/10/2023    Procedure: ESOPHAGOGASTRODUODENOSCOPY WITH ANESTHESIA;  Surgeon: Adrien Brewster MD;  Location: [facility] ENDOSCOPY;  Service: Gastroenterology;  Laterality: N/A;  preop; anemia  postop esophagitis ; R/O barretts   PCP Randall Beata    EYE SURGERY Bilateral      catorac    INCISION AND DRAINAGE PERIRECTAL ABSCESS N/A 2017    Procedure: INCISION AND DRAINAGE OF JEET ANAL ABSCESS;  Surgeon: Lynette Smith MD;  Location:  [facility] OR;  Service:     INGUINAL HERNIA REPAIR Bilateral 2023    Procedure: INGUINAL HERNIA BILATERAL REPAIR LAPAROSCOPIC WITH DAVINCI ROBOT WITH MESH;  Surgeon: Tahira Rivera MD;  Location:  [facility] OR;  Service: Robotics - DaVinci;  Laterality: Bilateral;    INSERTION HEMODIALYSIS CATHETER N/A 2025    Procedure: HEMODIALYSIS CATHETER PLACEMENT;  Surgeon: Gil Pineda DO;  Location:  [facility] HYBRID OR;  Service: Vascular;  Laterality: N/A;    MYRINGOTOMY W/ TUBES Left 2017    06/10/2016    TONSILLECTOMY      TOTAL HIP ARTHROPLASTY Right        Family History  Family History   Problem Relation Age of Onset    Breast cancer Mother     Dementia Father     Glaucoma Father     No Known Problems Daughter     Colon polyps Neg Hx     Colon cancer Neg Hx        Social History  Social History     Socioeconomic History    Marital status:    Tobacco Use    Smoking status: Former     Current packs/day: 0.00     Average packs/day: 0.5 packs/day for 25.8 years (12.9 ttl pk-yrs)     Types: Cigarettes     Start date:      Quit date: 10/13/2013     Years since quittin.7     Passive exposure: Past    Smokeless tobacco: Never    Tobacco comments:     quit 2013   Vaping Use    Vaping status: Former    Substances: Nicotine    Devices: Pre-filled or refillable cartridge   Substance and Sexual Activity    Alcohol use: Not Currently    Drug use: No    Sexual activity: Not Currently     Partners: Female       Review of Systems:  History obtained from chart review and the patient  General ROS: No fever or chills  Respiratory ROS: No cough, shortness of breath, wheezing  Cardiovascular ROS: No chest pain or palpitations  Gastrointestinal ROS: No abdominal pain or melena  Genito-Urinary ROS: No dysuria or hematuria  Psych ROS:  No anxiety and depression  14 point ROS reviewed with the patient and negative except as noted above and in the HPI unless unable to obtain.    Objective:  Patient Vitals for the past 24 hrs:   BP Temp Temp src Pulse Resp SpO2 Weight   06/24/25 0945 (!) 91/38 98.5 °F (36.9 °C) Oral 52 16 97 % --   06/24/25 0725 145/47 98.1 °F (36.7 °C) Oral 78 16 94 % --   06/24/25 0531 -- -- -- -- -- -- 42.3 kg (93 lb 3.2 oz)   06/24/25 0319 165/54 98.3 °F (36.8 °C) Oral 73 16 96 % --   06/23/25 2244 177/52 99.5 °F (37.5 °C) Oral 71 16 96 % --   06/23/25 1959 159/50 99 °F (37.2 °C) Oral 69 16 97 % --   06/23/25 1524 129/40 97.9 °F (36.6 °C) Oral 64 16 97 % --   06/23/25 1403 134/43 98 °F (36.7 °C) Oral 65 16 98 % --       Intake/Output Summary (Last 24 hours) at 6/24/2025 0948  Last data filed at 6/23/2025 1900  Gross per 24 hour   Intake --   Output 250 ml   Net -250 ml     General: awake/alert   Chest:  clear to auscultation bilaterally without respiratory distress  CVS: regular rate and rhythm  Abdominal: soft, nontender, positive bowel sounds  Extremities: no cyanosis or edema  Skin: warm and dry without rash      Labs:  Results from last 7 days   Lab Units 06/24/25  0420 06/23/25  0339 06/22/25  0510   WBC 10*3/mm3 7.88 6.84 8.02   HEMOGLOBIN g/dL 7.5* 7.6* 8.7*   HEMATOCRIT % 23.6* 23.8* 26.8*   PLATELETS 10*3/mm3 107* 125* 119*         Results from last 7 days   Lab Units 06/24/25  0420 06/23/25  0339 06/22/25  0510 06/21/25  0620   SODIUM mmol/L 137 135* 136 140   POTASSIUM mmol/L 4.0 3.7 3.7 3.6   CHLORIDE mmol/L 105 101 102 104   CO2 mmol/L 21.0* 22.0 22.0 22.0   BUN mg/dL 24.2* 44.4* 37.5* 23.3*   CREATININE mg/dL 2.25* 3.81* 3.17* 2.06*   CALCIUM mg/dL 8.6 8.6 9.2 8.2*   EGFR mL/min/1.73 29.3* 15.6* 19.4* 32.6*   BILIRUBIN mg/dL  --  0.3 0.4 0.4   ALK PHOS U/L  --  144* 174* 132*   ALT (SGPT) U/L  --  <5 <5 <5   AST (SGOT) U/L  --  12 16 13   GLUCOSE mg/dL 103* 99 107* 84       Radiology:   Imaging Results (Last 72  "Hours)       Procedure Component Value Units Date/Time    IR Angiogram Extremity [[id number]] Resulted: 06/23/25 1608     Updated: 06/23/25 1608            Culture:  No results found for: \"BLOODCX\", \"URINECX\", \"WOUNDCX\", \"MRSACX\", \"RESPCX\", \"STOOLCX\"      Assessment    End stage renal disease on hemodialysis  Hypertension  Anemia of CKD  CLL  Severe peripheral vascular disease--s/p multiple vascular procedures, most recent on 6/28.  Chronic diastolic CHF    Plan:   Dialysis next due 6/25  Scheduled for right AKA today  Monitor labs      Slava Tate, APRN  6/24/2025  09:48 CDT    "

## 2025-06-24 NOTE — PLAN OF CARE
Goal Outcome Evaluation:  Plan of Care Reviewed With: patient        Progress: no change  Outcome Evaluation: Pt resting intermittently. C/o pain often, see MAR. Voiding small amounts via urinal. Tried to have BM but unsuccessful. NPO for right AKA today. Safety maintained.

## 2025-06-24 NOTE — PROGRESS NOTES
[facility] Medicine Services  INPATIENT PROGRESS NOTE    Patient Name: Ricardo Hugo  Date of Admission: 6/19/2025  Today's Date: 06/24/25  Length of Stay: 5  Primary Care Physician: Nathan Zimmer MD    Subjective   Chief Complaint: f/u PAD/occlusion    HPI   Patient seen and evaluated.  No family at bedside today.  Is awake and interactive.  No new complaints.  Continues to have pain and discoloration of the right foot.  No chest pain.  No shortness of breath.  No nausea.  Tolerating p.o.  Plans for AKA today and patient agreeable and ready.      Review of Systems   All pertinent negatives and positives are as above. All other systems have been reviewed and are negative unless otherwise stated.     Objective    Temp:  [97.9 °F (36.6 °C)-99.5 °F (37.5 °C)] 98.1 °F (36.7 °C)  Heart Rate:  [64-78] 78  Resp:  [16] 16  BP: (129-177)/(40-54) 145/47  Physical Exam  GEN: Awake, alert, interactive, in NAD, appears thin/frail  HEENT: PERRLA, EOMI, Anicteric, Trachea midline  Lungs: no wheezing/rales/rhonchi  Heart: RRR, +S1/s2, no rub  ABD: soft, nt/nd, +BS, no guarding/rebound  Extremities: Ongoing discoloration of right 2nd and 3rd toes. Third toe has become dark and gangrenous.  Starting to have soft tissue ulceration on dorsal aspect.    Neuro: AAOx3, no focal deficits  Psych: normal mood & affect        Results Review:  I have reviewed the labs, radiology results, and diagnostic studies.    Laboratory Data:   Results from last 7 days   Lab Units 06/24/25  0420 06/23/25  0339 06/22/25  0510   WBC 10*3/mm3 7.88 6.84 8.02   HEMOGLOBIN g/dL 7.5* 7.6* 8.7*   HEMATOCRIT % 23.6* 23.8* 26.8*   PLATELETS 10*3/mm3 107* 125* 119*        Results from last 7 days   Lab Units 06/24/25  0420 06/23/25  0339 06/22/25  0510 06/21/25  0620   SODIUM mmol/L 137 135* 136 140   POTASSIUM mmol/L 4.0 3.7 3.7 3.6   CHLORIDE mmol/L 105 101 102 104   CO2 mmol/L 21.0* 22.0 22.0 22.0   BUN mg/dL 24.2*  "44.4* 37.5* 23.3*   CREATININE mg/dL 2.25* 3.81* 3.17* 2.06*   CALCIUM mg/dL 8.6 8.6 9.2 8.2*   BILIRUBIN mg/dL  --  0.3 0.4 0.4   ALK PHOS U/L  --  144* 174* 132*   ALT (SGPT) U/L  --  <5 <5 <5   AST (SGOT) U/L  --  12 16 13   GLUCOSE mg/dL 103* 99 107* 84       Culture Data:   No results found for: \"BLOODCX\", \"URINECX\", \"WOUNDCX\", \"MRSACX\", \"RESPCX\", \"STOOLCX\"    Radiology Data:   Imaging Results (Last 24 Hours)       Procedure Component Value Units Date/Time    IR Angiogram Extremity [[id number]] Resulted: 06/23/25 1608     Updated: 06/23/25 1608            I have reviewed the patient's current medications.     Assessment/Plan   Assessment  Active Hospital Problems    Diagnosis     **Vascular occlusion     ESRD (end stage renal disease) on dialysis     Chronic diastolic (congestive) heart failure     CLL (chronic lymphocytic leukemia)     Peripheral arterial disease     Essential hypertension        Treatment Plan  #1 vascular occlusion - with intervention on 6/22 right popliteal artery and peroneal artery.  Also had PTA to the right posterior tibial artery.  Was on heparin drip preprocedure. Post procedure on aspirin, Plavix, statin.  Fortunately despite attempts at revascularization patient continues to have PAD with poor blood flow to his lower extremity.  He is developing gangrenous third toe on the right with soft ulceration.  Vascular surgery plans on taking for AKA today.  Discussed yesterday with team.  Patient agreeable and ready to proceed.    #2 ESRD -had HD yesterday.  Nephrology following.  Continue as per schedule.     #3 chronic diastolic CHF -appears euvolemic.  No signs of exacerbation.    #4 hypertension -Home BP meds resumed.  Monitor.  He is on extensive regimen of nifedipine, Isordil, hydralazine, Coreg, Aldactone.  Hydralazine was put on hold 6/22.  Patient continues to intermittently get some blood pressures held by nursing staff due to low diastolic pressures.  It iss making it very " difficult to manage his blood pressure regimen. Aldactone and valsartan put on hold yesterday and currently monitoring with other meds.    #5 chronic constipation -continue bowel regimen.  Positive BM    Medical Decision Making  Number and Complexity of problems: 1 acute, multiple chronic  Differential Diagnosis: As above    Conditions and Status        Initially had some improvement but now having worsening of his right foot with gangrenous changes to his third toe.  Will discuss case with vascular.  May need further intervention and amputation.     [facility] Data  External documents reviewed: None  Cardiac tracing (EKG, telemetry) interpretation: None  Radiology interpretation: Reports reviewed  Labs reviewed: As above  Any tests that were considered but not ordered: None     Decision rules/scores evaluated (example [id number], Wells, etc): None     Discussed with: Patient, nursing, SW     Care Planning  Shared decision making: Patient apprised of current labs, vitals, imaging and treatment plan.  They are agreeable with proceeding with plans as discussed.    Code status and discussions: full code    Disposition  Social Determinants of Health that impact treatment or disposition: none    Discharge when appropriate, going for AKA today, will be several days.         Electronically signed by Dc Issa DO, 06/24/25, 09:45 CDT.

## 2025-06-24 NOTE — PLAN OF CARE
Goal Outcome Evaluation:  Plan of Care Reviewed With: patient, family        Progress: improving  Outcome Evaluation: AOx4, forgetful after procedure. C/o pain, prn percocet given. VSS. 1U prbc before procedure. Dressing intact. SR on tele. RA. Renal diet restarted. Plan for HD tomorrow. Safety maintained.

## 2025-06-24 NOTE — PLAN OF CARE
Goal Outcome Evaluation:              Outcome Evaluation: Pt outside of room for right AKA due to right lower extremity ischemic rest pain. No intake recorded in the last 72 hr, but per provider notes, pt is tolerating PO. Was NPO for surgery. New labs: BUN 24.2, Creat 2.25, Alb 3.1, Glu 103. Last BM 6/23. No diet order on [facility] secure chat nurse. Will start him on Boost Chocolate TID for additional nutrition to support wound healing and recovery. See MAR for medications. Will continue to monitor.

## 2025-06-24 NOTE — PLAN OF CARE
Goal Outcome Evaluation:              Outcome Evaluation: Pt outside of room for right AKA due to right lower extremity ischemic rest pain. No intake recorded in the last 72 hr, but per provider notes, pt is tolerating PO. Currently NPO. New labs: BUN 24.2, Creat 2.25, Alb 3.1, Glu 103. Last BM 6/23. No diet order on [facility] secure chat nurse. Will start him on Boost Chocolate TID for additional nutrition to support wound healing and recovery. See MAR for medications. Will continue to monitor.

## 2025-06-24 NOTE — OP NOTE
Ricardo Hugo  6/24/2025     PREOPERATIVE DIAGNOSIS: Vascular occlusion [I99.8]     POSTOPERATIVE DIAGNOSIS: Post-Op Diagnosis Codes:     * Vascular occlusion [I99.8]     PROCEDURE PERFORMED:   Right above knee amputation     SURGEON: Denzel Zabala MD      ANESTHESIA: General.    PREPARATION: Routine.    STAFF: Circulator: Francisco Lopez RN  Scrub Person: Adonis Anaya; Tasha Browning    Estimated Blood Loss: <500ml    SPECIMENS: right leg    COMPLICATIONS: none    INDICATIONS: Ricardo Hugo is a 75 y.o. male with severe PAD.  Pt has had continued ischemic pain in the right lower extremity with development of wounds.  After an extensive discussion over revascularization and amputation the patient elected to proceed with amputation. The indications, risks, and possible complications of the procedure were explained to the patient, who voiced understanding and wished to proceed with surgery.     PROCEDURE IN DETAIL:   The patient was taken to the operating room and placed on the operating table in a supine position. After general anesthesia was obtained, the right lower extremity was prepped and draped in a sterile manner.  Approximately 4 fingerbreadths above the knee the standard fishmouth incision was made.  Careful dissection was carried down through the subcutaneous tissues using the Bovie cautery to ensure hemostasis.  Any crossing veins were ligated with 3-0 silk suture and hemoclips.  The fascia was incised circumferentially.  The muscle compartments were taken down very carefully with the Bovie cautery and a Lyssa clamp.  The medial compartment was taken down and the superficial femoral artery and vein were identified and carefully dissected free with the Metzenbaum scissors.  They were each clamped separately, transected and tied off with 2-0 silk suture ligatures.  At this point the femur was cleaned off with a periosteal elevator.  A sponge was placed behind the femur successfully and then approximately  2-3 inches above the actual skin incision the femur was transected.  Once the femur was transected the posterior flap was created with the amputation knife.  The lower leg was removed as specimen.  Any loose bleeders were ligated with a hemostat and tied off with 3-0 silk suture ligatures.  The entire wound was then irrigated with antibiotic saline.  Hemostasis was observed.  The anterior part of the femur was removed and the bone file was used to smooth it out.  A purse string stitch was used to close the muscle over the transected femur.  Next the muscle was then closed over the femur with a 2-0 Vicryl in an interrupted fashion.  Next the entire fascial line was reapproximated using a 0 Vicryl in interrupted fashion.  Lastly the skin was reapproximated using staples.  The wound was then cleaned.  Sterile dressings were applied. The patient tolerated the procedure well. Sponge and needle counts were correct. The patient was then awakened and extubated in the operating room and taken to the recovery room in good condition.    Blayne Zabala MD  6/24/2025  14:07 CDT

## 2025-06-25 LAB
ALBUMIN SERPL-MCNC: 3.1 G/DL (ref 3.5–5.2)
ALBUMIN/GLOB SERPL: 1.3 G/DL
ALP SERPL-CCNC: 127 U/L (ref 39–117)
ALT SERPL W P-5'-P-CCNC: <5 U/L (ref 1–41)
ANION GAP SERPL CALCULATED.3IONS-SCNC: 12 MMOL/L (ref 5–15)
AST SERPL-CCNC: 24 U/L (ref 1–40)
BASOPHILS # BLD AUTO: 0.04 10*3/MM3 (ref 0–0.2)
BASOPHILS NFR BLD AUTO: 0.6 % (ref 0–1.5)
BH BB BLOOD EXPIRATION DATE: NORMAL
BH BB BLOOD TYPE BARCODE: 9500
BH BB DISPENSE STATUS: NORMAL
BH BB PRODUCT CODE: NORMAL
BH BB UNIT NUMBER: NORMAL
BILIRUB SERPL-MCNC: 0.3 MG/DL (ref 0–1.2)
BUN SERPL-MCNC: 28.9 MG/DL (ref 8–23)
BUN/CREAT SERPL: 10 (ref 7–25)
CALCIUM SPEC-SCNC: 8.6 MG/DL (ref 8.6–10.5)
CHLORIDE SERPL-SCNC: 104 MMOL/L (ref 98–107)
CO2 SERPL-SCNC: 19 MMOL/L (ref 22–29)
CREAT SERPL-MCNC: 2.89 MG/DL (ref 0.76–1.27)
CROSSMATCH INTERPRETATION: NORMAL
DEPRECATED RDW RBC AUTO: 65.9 FL (ref 37–54)
EGFRCR SERPLBLD CKD-EPI 2021: 21.7 ML/MIN/1.73
EOSINOPHIL # BLD AUTO: 0.37 10*3/MM3 (ref 0–0.4)
EOSINOPHIL NFR BLD AUTO: 5.3 % (ref 0.3–6.2)
ERYTHROCYTE [DISTWIDTH] IN BLOOD BY AUTOMATED COUNT: 17.4 % (ref 12.3–15.4)
GLOBULIN UR ELPH-MCNC: 2.4 GM/DL
GLUCOSE SERPL-MCNC: 89 MG/DL (ref 65–99)
HCT VFR BLD AUTO: 21.4 % (ref 37.5–51)
HCT VFR BLD AUTO: 22.9 % (ref 37.5–51)
HGB BLD-MCNC: 7 G/DL (ref 13–17.7)
HGB BLD-MCNC: 7.1 G/DL (ref 13–17.7)
IMM GRANULOCYTES # BLD AUTO: 0.08 10*3/MM3 (ref 0–0.05)
IMM GRANULOCYTES NFR BLD AUTO: 1.1 % (ref 0–0.5)
LYMPHOCYTES # BLD AUTO: 0.59 10*3/MM3 (ref 0.7–3.1)
LYMPHOCYTES NFR BLD AUTO: 8.5 % (ref 19.6–45.3)
MCH RBC QN AUTO: 32 PG (ref 26.6–33)
MCHC RBC AUTO-ENTMCNC: 31 G/DL (ref 31.5–35.7)
MCV RBC AUTO: 103.2 FL (ref 79–97)
MONOCYTES # BLD AUTO: 0.63 10*3/MM3 (ref 0.1–0.9)
MONOCYTES NFR BLD AUTO: 9 % (ref 5–12)
NEUTROPHILS NFR BLD AUTO: 5.26 10*3/MM3 (ref 1.7–7)
NEUTROPHILS NFR BLD AUTO: 75.5 % (ref 42.7–76)
NRBC BLD AUTO-RTO: 0 /100 WBC (ref 0–0.2)
PLATELET # BLD AUTO: 121 10*3/MM3 (ref 140–450)
PMV BLD AUTO: 10 FL (ref 6–12)
POTASSIUM SERPL-SCNC: 4.4 MMOL/L (ref 3.5–5.2)
PROT SERPL-MCNC: 5.5 G/DL (ref 6–8.5)
RBC # BLD AUTO: 2.22 10*6/MM3 (ref 4.14–5.8)
SODIUM SERPL-SCNC: 135 MMOL/L (ref 136–145)
UNIT  ABO: NORMAL
UNIT  RH: NORMAL
WBC NRBC COR # BLD AUTO: 6.97 10*3/MM3 (ref 3.4–10.8)

## 2025-06-25 PROCEDURE — 85018 HEMOGLOBIN: CPT | Performed by: INTERNAL MEDICINE

## 2025-06-25 PROCEDURE — 25010000002 MORPHINE PER 10 MG

## 2025-06-25 PROCEDURE — 80053 COMPREHEN METABOLIC PANEL: CPT | Performed by: STUDENT IN AN ORGANIZED HEALTH CARE EDUCATION/TRAINING PROGRAM

## 2025-06-25 PROCEDURE — 85025 COMPLETE CBC W/AUTO DIFF WBC: CPT | Performed by: STUDENT IN AN ORGANIZED HEALTH CARE EDUCATION/TRAINING PROGRAM

## 2025-06-25 PROCEDURE — 25010000002 MORPHINE PER 10 MG: Performed by: INTERNAL MEDICINE

## 2025-06-25 PROCEDURE — 85014 HEMATOCRIT: CPT | Performed by: INTERNAL MEDICINE

## 2025-06-25 PROCEDURE — 25010000002 HEPARIN (PORCINE) PER 1000 UNITS: Performed by: STUDENT IN AN ORGANIZED HEALTH CARE EDUCATION/TRAINING PROGRAM

## 2025-06-25 PROCEDURE — 25010000002 EPOETIN ALFA-EPBX 10000 UNIT/ML SOLUTION: Performed by: STUDENT IN AN ORGANIZED HEALTH CARE EDUCATION/TRAINING PROGRAM

## 2025-06-25 RX ORDER — MORPHINE SULFATE 2 MG/ML
1 INJECTION, SOLUTION INTRAMUSCULAR; INTRAVENOUS ONCE AS NEEDED
Status: COMPLETED | OUTPATIENT
Start: 2025-06-25 | End: 2025-06-25

## 2025-06-25 RX ORDER — MORPHINE SULFATE 2 MG/ML
1 INJECTION, SOLUTION INTRAMUSCULAR; INTRAVENOUS EVERY 6 HOURS PRN
Status: DISPENSED | OUTPATIENT
Start: 2025-06-25 | End: 2025-06-26

## 2025-06-25 RX ADMIN — OXYCODONE AND ACETAMINOPHEN 1 TABLET: 5; 325 TABLET ORAL at 13:10

## 2025-06-25 RX ADMIN — OXYCODONE AND ACETAMINOPHEN 1 TABLET: 5; 325 TABLET ORAL at 09:02

## 2025-06-25 RX ADMIN — MORPHINE SULFATE 1 MG: 2 INJECTION, SOLUTION INTRAMUSCULAR; INTRAVENOUS at 05:23

## 2025-06-25 RX ADMIN — DOCUSATE SODIUM 50 MG AND SENNOSIDES 8.6 MG 2 TABLET: 8.6; 5 TABLET, FILM COATED ORAL at 12:43

## 2025-06-25 RX ADMIN — DOCUSATE SODIUM 50 MG AND SENNOSIDES 8.6 MG 2 TABLET: 8.6; 5 TABLET, FILM COATED ORAL at 21:34

## 2025-06-25 RX ADMIN — GABAPENTIN 300 MG: 300 CAPSULE ORAL at 21:34

## 2025-06-25 RX ADMIN — EPOETIN ALFA-EPBX 10000 UNITS: 10000 INJECTION, SOLUTION INTRAVENOUS; SUBCUTANEOUS at 12:44

## 2025-06-25 RX ADMIN — TAMSULOSIN HYDROCHLORIDE 0.4 MG: 0.4 CAPSULE ORAL at 21:34

## 2025-06-25 RX ADMIN — MORPHINE SULFATE 1 MG: 2 INJECTION, SOLUTION INTRAMUSCULAR; INTRAVENOUS at 14:49

## 2025-06-25 RX ADMIN — CLOPIDOGREL BISULFATE 75 MG: 75 TABLET, FILM COATED ORAL at 12:44

## 2025-06-25 RX ADMIN — HEPARIN SODIUM 3200 UNITS: 1000 INJECTION INTRAVENOUS; SUBCUTANEOUS at 10:58

## 2025-06-25 RX ADMIN — ISOSORBIDE DINITRATE 10 MG: 10 TABLET ORAL at 12:43

## 2025-06-25 RX ADMIN — MONTELUKAST SODIUM 10 MG: 10 TABLET, COATED ORAL at 21:34

## 2025-06-25 RX ADMIN — NIFEDIPINE 120 MG: 60 TABLET, FILM COATED, EXTENDED RELEASE ORAL at 18:57

## 2025-06-25 RX ADMIN — LEVOTHYROXINE SODIUM 100 MCG: 0.1 TABLET ORAL at 05:23

## 2025-06-25 RX ADMIN — Medication 10 ML: at 21:34

## 2025-06-25 RX ADMIN — ATORVASTATIN CALCIUM 10 MG: 10 TABLET, FILM COATED ORAL at 21:34

## 2025-06-25 RX ADMIN — CARVEDILOL 12.5 MG: 6.25 TABLET, FILM COATED ORAL at 12:44

## 2025-06-25 RX ADMIN — OXYCODONE AND ACETAMINOPHEN 1 TABLET: 5; 325 TABLET ORAL at 02:39

## 2025-06-25 RX ADMIN — ASPIRIN 81 MG: 81 TABLET, COATED ORAL at 12:43

## 2025-06-25 NOTE — PROGRESS NOTES
[facility] Medicine Services  INPATIENT PROGRESS NOTE    Patient Name: Ricardo Hugo  Date of Admission: 6/19/2025  Today's Date: 06/25/25  Length of Stay: 6  Primary Care Physician: Nathan Zimmer MD    Subjective   Chief Complaint: f/u PAD/occlusion    HPI   Seen today post dialysis.  Awake and interactive.  Eating lunch.  Complains of achy pain at the right thigh.  No other complaints.  No chest pain.  No shortness of breath.  No fevers noted.      Review of Systems   All pertinent negatives and positives are as above. All other systems have been reviewed and are negative unless otherwise stated.     Objective    Temp:  [97.5 °F (36.4 °C)-98.9 °F (37.2 °C)] 98.3 °F (36.8 °C)  Heart Rate:  [54-73] 73  Resp:  [12-19] 16  BP: (121-178)/(43-62) 148/52  Physical Exam  GEN: Awake, alert, interactive, in NAD, appears thin/frail  HEENT: PERRLA, EOMI, Anicteric, Trachea midline  Lungs: no wheezing/rales/rhonchi  Heart: RRR, +S1/s2, no rub  ABD: soft, nt/nd, +BS, no guarding/rebound  Extremities: Right AKA, bandage intact and clean/dry.  Neuro: AAOx3, no focal deficits  Psych: normal mood & affect        Results Review:  I have reviewed the labs, radiology results, and diagnostic studies.    Laboratory Data:   Results from last 7 days   Lab Units 06/25/25  0305 06/24/25 0420 06/23/25  0339   WBC 10*3/mm3 6.97 7.88 6.84   HEMOGLOBIN g/dL 7.1* 7.5* 7.6*   HEMATOCRIT % 22.9* 23.6* 23.8*   PLATELETS 10*3/mm3 121* 107* 125*        Results from last 7 days   Lab Units 06/25/25  0305 06/24/25  0420 06/23/25  0339 06/22/25  0510   SODIUM mmol/L 135* 137 135* 136   POTASSIUM mmol/L 4.4 4.0 3.7 3.7   CHLORIDE mmol/L 104 105 101 102   CO2 mmol/L 19.0* 21.0* 22.0 22.0   BUN mg/dL 28.9* 24.2* 44.4* 37.5*   CREATININE mg/dL 2.89* 2.25* 3.81* 3.17*   CALCIUM mg/dL 8.6 8.6 8.6 9.2   BILIRUBIN mg/dL 0.3  --  0.3 0.4   ALK PHOS U/L 127*  --  144* 174*   ALT (SGPT) U/L <5  --  <5 <5   AST (SGOT)  "U/L 24  --  12 16   GLUCOSE mg/dL 89 103* 99 107*       Culture Data:   No results found for: \"BLOODCX\", \"URINECX\", \"WOUNDCX\", \"MRSACX\", \"RESPCX\", \"STOOLCX\"    Radiology Data:   Imaging Results (Last 24 Hours)       ** No results found for the last 24 hours. **            I have reviewed the patient's current medications.     Assessment/Plan   Assessment  Active Hospital Problems    Diagnosis     **Vascular occlusion     ESRD (end stage renal disease) on dialysis     Chronic diastolic (congestive) heart failure     CLL (chronic lymphocytic leukemia)     Peripheral arterial disease     Essential hypertension        Treatment Plan  #1 vascular occlusion - with intervention on 6/22 right popliteal artery and peroneal artery.  Also had PTA to the right posterior tibial artery.  Was on heparin drip preprocedure. Post procedure on aspirin, Plavix, statin.  Fortunately despite attempts at revascularization patient continues to have PAD with poor blood flow to his lower extremity.  He developed gangrenous changes of the right third toe.  As such was taken for AKA yesterday.  Continue postoperative care per vascular.    #2 ESRD -HD today.  Tolerated.  Nephrology following.  Continue as per schedule.     #3 chronic diastolic CHF -appears euvolemic.  No signs of exacerbation.    #4 hypertension -Home BP meds resumed.  Monitor.  He is on extensive regimen of nifedipine, Isordil, hydralazine, Coreg, Aldactone.  Hydralazine was put on hold 6/22.  Patient continues to intermittently get some blood pressures held by nursing staff due to low diastolic pressures.  It iss making it very difficult to manage his blood pressure regimen. Aldactone and valsartan put on hold yesterday and currently monitoring with other meds.    #5 chronic constipation -continue bowel regimen.  Positive BM    #6 anemia of chronic disease -monitor closely.  Slight drift in H&H today post OR yesterday.  Monitor for transfusion needs.    Medical Decision " Making  Number and Complexity of problems: 1 acute, multiple chronic  Differential Diagnosis: As above    Conditions and Status       Improving, not at goal     MDM Data  External documents reviewed: None  Cardiac tracing (EKG, telemetry) interpretation: None  Radiology interpretation: Reports reviewed  Labs reviewed: As above  Any tests that were considered but not ordered: None     Decision rules/scores evaluated (example [id number], Wells, etc): None     Discussed with: Patient, nursing, SW     Care Planning  Shared decision making: Patient apprised of current labs, vitals, imaging and treatment plan.  They are agreeable with proceeding with plans as discussed.    Code status and discussions: full code    Disposition  Social Determinants of Health that impact treatment or disposition: none    Status post AKA yesterday.  Will discharge back to facility when appropriate and okay with vascular surgery postoperatively.        Electronically signed by Dc Issa DO, 06/25/25, 12:33 CDT.

## 2025-06-25 NOTE — CASE MANAGEMENT/SOCIAL WORK
Continued Stay Note  NANDINI Oneill     Patient Name: Ricardo Hugo  MRN: [id number]  Today's Date: 6/25/2025    Admit Date: 6/19/2025    Plan: [facility]   Discharge Plan       Row Name 06/25/25 1213       Plan    Plan [facility]    Patient/Family in Agreement with Plan yes    Plan Comments Plan is for pt to return to [facility] at d/c. Will follow.                   Discharge Codes    No documentation.                 Expected Discharge Date and Time       Expected Discharge Date Expected Discharge Time    Jun 21, 2025               LINNETTE Ramos

## 2025-06-25 NOTE — NURSING NOTE
[facility] INPATIENT SERVICES  DIALYSIS TREATMENT SUMMARY     Note: Consult with the attending physician for patient treatment orders, this document is not a physician order.     Patient Information  Patient Ricardo Hugo  Date of Birth February 22, 1950  Chart Number [id number]  Location [facility]  Location MRN [id number]  Gender Male  SSN (last 4)   Treatment Information  Treatment Type Hemodialysis  Treatment Id [id number]  Start Time June 25, 2025 08:19  End Time June 25, 2025 11:50  Acutal Duration 03:31  Treatment Balances  Total Saline Administered 500  Net Fluid Balance -1500  Hemodialysis Orders  Therapy Standard  Orders Verified Time 06/25/2025 07:00   Date Verified 06/25/2025  Duration 3:30  Isolated UF/Bypass No  BFR (mL) 400  DFR (mL) 700  Dialyzer Type OPTIFLUX 160NR  UF Order UF Goal Ordered  UF Goal Ordered (mL) 1500  Crit-Line used No  Heparin Initial Units Bolus No  Heparin IV Maintenance Bolus No  Heparin IV Infusion No  Potassium (mEq/L) 3.0  Calcium (mEq/L) 2.5  Bicarb (mg/dL) 33  Sodium (mEq/L) 140  Clinician Erika Hines RN  Dialysis Access  Access Type Central Venous Catheter  Central Venous Catheter  Access Type Catheter - Tunneled  Access Location Chest Wall - R  Current/New [facility] Patient Yes  1st Use Catheter Verified by Previous Use  Catheter Care Completed per Policy Yes  Dressing Dry and Intact on Arrival Yes  Dressing Changed Yes  Type of Dressing Film Biopatch/CHG  Dressing Changed By [facility] Staff  Type of HD Caps Curos  HD Caps Changed Yes  CVC Line Education Provided Yes - Dressing Change Frequency  Vitals  Pre-Treatment Vitals  Time Is BP being recorded? Pre BP Map BP Method Pulse RR Temp How was Weight Obtained? Pre Weight Previous Dry Weight Previous Post Weight Metric Target Fluid Removal (mL) Dialysate Confirmed Clinician  06/25/2025 08:18 BP/Map 143/69 (94) Noninvasive 59 17 98 How Obtained: Bed Scale 43.2 59.6 - Kgs 2000  Erika Hines  RN  Comments: Patient has recent (R) AKA; EDW requires adjustment. Patient noted to be sleepy, but responds affectively to HD RN. Patient denies pain or discomfort at this time. education provided on dialysis process with recent AKA.  Intra Procedure Vitals  Rec Type Time Is BP being recorded? BP Map Pulse RR BFR DFR AP  TMP UFR RMVD Bolus NSS Flush Chills Safety Check Crit-Line HCT Crit-Line BV% Clinician  E 06/25/2025 08:19 BP/Map 159/55 (90) 61 17 400 700 -160 120 50 570 0 Yes 250  Yes   Erika Hines RN  Comments: HD started via (R) IJ perm catheter, venous clips applied, prime given, lines secured. Patient tolerated well. Machine alarms passed, DFR at ordered rate. Treatment in progress.  E 06/25/2025 08:30 BP/Map 150/73 (99) 55 17 400 700 -170 130 50 570 155    Yes   Erika Hines, DEMETRIUS  Comments: HD access visible, lines secured, venous clips intact. Patient resting, eyes closed. Treatment in progress. Will continue with current treatment plan at this time.  E 06/25/2025 09:00 BP/Map 143/72 (96) 45 16 400 700 -170 130 50 570 419    Yes   Erika Hines RN  Comments: HD access visible, lines secured, venous clips intact. Patient resting, eyes closed. Treatment in progress. Will continue with current treatment plan at this time.  E 06/25/2025 09:05 BP/Map               Yes   Erika Hines, DEMETRIUS  Comments: Primary nurse at bedside with requested pain medication.  E 06/25/2025 09:30 BP/Map 135/58 (84) 51 17 400 700 -170 130 50 570 678    Yes   Erika Hines RN  Comments: HD access visible, lines secured, venous clips intact. Patient resting, eyes closed. Treatment in progress. Will continue with current treatment plan at this time.  E 06/25/2025 10:00 BP/Map 141/64 (90) 72 16 400 700 -170 130 50 570 1086    Yes   Erika Hines RN  Comments: HD access visible, lines secured, venous clips intact. Patient resting, eyes closed. Treatment in progress. Will continue with current treatment plan at this  time.  E 06/25/2025 10:30 BP/Map 153/45 (81) 54 16 400 700 -180 140 50 570 1238    Yes   Erika Hines RN  Comments: Patient intermittent confused. Patient thinks he is in pain management. HD RN redirected patient to current location. HD access visible, lines secured, venous clips intact. Patient resting, eyes closed. Treatment in progress. Will continue with current treatment plan at this time.  E 06/25/2025 11:00 BP/Map 147/67 (94) 72 16 400 700 -180 140 50 570 1530    Yes   Erika Hines, DEMETRIUS  Comments: HD access visible, lines secured, venous clips intact. Patient resting, eyes closed. Treatment in progress. Will continue with current treatment plan at this time.  E 06/25/2025 11:30 BP/Map 148/66 (93) 75 16 400 700 -180 140 50 570 1794    Yes   Erika Hines RN  Comments: HD access visible, lines secured, venous clips intact. Patient resting, eyes closed. Treatment in progress. Will continue with current treatment plan at this time.  E 06/25/2025 11:50 BP/Map 136/53 (81) 68 16       2000 Yes 250  Yes   Erika Hines RN  Comments: Tx complete, blood returned.  Post Treatment Vitals  Time Is BP being recorded? BP Map Pulse RR Temp How was Weight Obtained? Post Weight Metric BVP UF Goal Ordered NSS Given Intra-Procedure Total Machine UF Removed (mL) Crit-Line Ending Profile Crit-Line Refill Crit-Line Ending HCT Crit-Line Max BV% Clinician  06/25/2025 12:05 BP/Map 163/62 (96) 66 16 98 How Obtained: Bed scale 58.1 Kgs 88.2 [phone]     Erika Hines, DEMETRIUS  Comments: Tolerated treatment well today.  Safety checks include: access uncovered and secured, Hemaclip secured for all central line access, machine checks performed, and alarm limits confirmed.  Labs  Hepatitis  HBsAG Lab Result HBsAG Lab Result HBsAG Draw Date Transient Antigenemia(MD Diagnosis Only) Anti-HBs Lab Result Anti-HBs Lab Value Anti-HBs Draw Date Documented By Documented Date Hepatitis Status Hepatitis  Status  Negative  06/09/2025  Negative  06/09/2025 Erika Hines 06/25/2025  Susceptible  Notes:   Pre-Treatment Hepatitis Precautions Copy of hepatitis results verified in hospital EMR Yes Signing  Patient tx outside buffer zone Yes Hepatitis Information Entered By Erika Hines RN Signed By Erika Hines RN  Pre-Treatment Handoff  Staff Report Received Yes  Report Given by Primary Nurse Kim Trevino RN  Time 08:00  Patient Arrival  Patient ID Verified Date of Birth  Full Name  Patient Consent to treatment verified Yes  Blood Transfusion Consent Verified N/A  Treatment Comments  Treatment Notes Hemodynamically stable post HD today.  Post-Treatment Handoff  Report Given to Primary Nurse iKm Trevino RN  Time Report Given 12:08  Report Given By Erika Hines RN  Machine Validation  Time 07:30  Date 6/25/2025  Auto Alarm Test Passed Yes  Machine Serial # [id number]  Portable RO Yes  RO Serial# 14  Residual Bleach Negative Yes  Was a manufactured mix used? Yes  Machine Log Completed Yes  Total Chlorine (less than 0.1)? Yes  Total Chlorine Log Completed Yes  Bicarb BiBag  Bibag Size 900  Machine Temp 36  Machine Conductivity 14.2  Meter Type N/A  Meter Conductivity   Independent Conductivity 14  pH Status Pass Pass  pH   TCD Value 13.9  TCD Alarm with +/- 0.5 Yes  NVL enabled validated 100 asymmetric Yes  Safety check complete Erika Hines RN  Second Verification Performed? No  Second Verification Performed By   Reason Not Verified No other staff member - unable to leave the patient  Serum Lab Values  Time BUN Creatinine Na K (mEq/L) Cl CO2 Ca (mEq/L) Phos Mg (mg/dL) Alb (g/dL) Glucose Hgb Hct WBC Plt PT aPTT INR Other Clinician  06/25/2025 03:05 28.9 2.89 135 4.4 104 19 8.6 - - 3.1 89 7.1 22.9 6.97 121 - - - - Erika Hines RN  Comments: LABS  Facility Information Room # 361 Diagnosis  Facility Information Bed # 4 Diagnosis Vascular occlusion  Admission Date 06/19/2025 Stat Treatment No Isolation  Information  Ordering MD Rueda Patient Type Chronic dialysis patient with diagnosis of ESRD Isolation Required? N  Account/Finance Number [id number] Patient Chronic Unit [facility] Completed by  Admission Status InPatient Patient Home Unit [facility] information Entered by Erika Hines RN  Location Dialysis Suite Multi Code Status Full Code   Start Treatment Time Out Confirmed by DEMETRIUS James/ DEMETRIUS Alvarez Correct access site verified Yes  Treatment Initiation Connections Secured Time Out Completed 08:05 Treatment Start Date 06/25/2025  Saline line double clamped Correct patient verified Yes Treatment Start Time 08:19  Hemaclip Applied Correct procedure verified Yes Patient/Family questions and concerns addressed Yes  Pre Focused Assessment Edema LOC Alert and Oriented x3  Access Location Generalized GI / Bowels  Signs and Symptoms of Infection? No Edema Grade 1+ GI Bowel sounds present  Pain Screening Notes (R) AKA   Does the patient have pain? No Cardiac  Anuric  Respiratory Heart Rhythm Regular Completed by  Lung Sounds Clear Telemetry Yes Pre Treatment Focused Assessment Completed By Erika Hines RN  Location Bilateral Skin Time 08:17  Position Anterior Skin Warm Signing  Respiratory Efforts Unlabored  Dry Signed By Erika Hines RN  Notes room air LOC   Education Method Knowledge / Understanding Assessed Teach back Method Knowledge / Understanding Assessed Teach back  Patient Education Family Education Provided? N/A Patient Education Introduced By  Patient Educated? Yes Caregiver Education Provided? Yes Patient Education Introduced By Erika Hines RN  Focus or Topic Hemodialysis treatment review Focus or Topic Hemodialysis treatment review   Post-Treatment Delay Note Slow transport after request entered. Notes Verbal to primary nurse.  Post Treatment Delay Machine Disinfection Requirement Completed by  Delay Y HD machine external disinfection completed per policy Yes Post Treatment Form  Completed By Erika Hines RN  Delay Type Delay Due to Hospital Transportation PRO external disinfection completed per policy Yes   Delay Hours 0.25 Post Treatment General Information   Post Focused Assessment Location Bilateral LOC  Changes from Pre Focused Assessment Position Anterior LOC Alert and Oriented x3  Changes from Pre Treatment Focused Assessment? No Respiratory Efforts Unlabored GI / Bowels  Access Notes room air GI Bowel sounds present  Cath Packed with Heparin Edema   Access Port(ml) 1.6 Location Generalized  Anuric  Return Port(ml) 1.6 Edema Grade 1+ Completed by  Catheter clamped and capped Yes Notes (R) AKA Post Treatment Focused Assessment Completed by Erika Hines RN  Access Flow Good Cardiac Date 06/25/2025  Dialyzer Clearance Streaked Heart Rhythm Regular Time 12:06  Pain Screening Telemetry Yes Signing  Does the patient have pain? No Skin Signed By Erika Hines RN  Respiratory Skin Warm   Lung Sounds Clear  Dry

## 2025-06-25 NOTE — PROGRESS NOTES
Nephrology ([facility]) Progress Note      Patient:  Ricardo Hugo  YOB: 1950  Date of Service: 6/25/2025  MRN: [id number]   Acct: [id number]   Primary Care Physician: Nathan Zimmer MD  Advance Directive:   Code Status and Medical Interventions: CPR (Attempt to Resuscitate); Full Support   Ordered at: 06/19/25 1944     Code Status (Patient has no pulse and is not breathing):    CPR (Attempt to Resuscitate)     Medical Interventions (Patient has pulse or is breathing):    Full Support     Admit Date: 6/19/2025       Hospital Day: 6  Referring Provider: No Known Provider      Patient personally seen and examined.  Complete chart including Consults, Notes, Operative Reports, Labs, Cardiology, and Radiology studies reviewed as able.        Subjective:  Ricardo Hugo is a 75 y.o. male for whom we were consulted for evaluation and treatment of end-stage renal disease on maintenance dialysis. Patient has permacatheter as a dialysis access. He also has history of peripheral vascular disease, right carotid endarterectomy, PTA of the right external iliac artery, right SFA. Presenting to the emergency room complaining of right leg pain. His pain has progressively getting worse and has decided come to the emergency room. Patient is currently being evaluated by Dr. Zabala for another arteriogram, look at the status of his blood flow to the right leg. On June 28 patient underwent intravascular lithotripsy of popliteal artery, posterior tibial artery followed by balloon angioplasties. Tolerating HD treatments well. Underwent right AKA on 6/24.    Today is awake and alert. Seen during dialysis and tolerating well. No new overnight issues.   Dialysis   Patient was seen and evaluated on renal replacement therapy and I have personally evaluated the patient and directed the therapy  Modality: Hemodialysis  Access: Catheter  Location: right upper  QB: 400  QD: 700  UF:  1500      Allergies:  Ondansetron, Zofran [ondansetron hcl], Lortab [hydrocodone-acetaminophen], and Allopurinol    Home Meds:  Medications Prior to Admission   Medication Sig Dispense Refill Last Dose/Taking    ALPRAZolam (XANAX) 0.25 MG tablet Take 1 tablet by mouth Every Night.   Taking    aspirin (aspirin) 81 MG EC tablet Take 1 tablet by mouth Daily.   Taking    atorvastatin (LIPITOR) 10 MG tablet Take 1 tablet by mouth Daily. 90 tablet 3 Taking    Budeson-Glycopyrrol-Formoterol (Breztri Aerosphere) 160-9-4.8 MCG/ACT aerosol inhaler Inhale 2 puffs 2 (Two) Times a Day.   Taking    calcitriol (ROCALTROL) 0.5 MCG capsule Take 1 capsule by mouth Daily. 90 capsule 2 Taking    carvedilol (COREG) 12.5 MG tablet Take 1 tablet by mouth 2 (Two) Times a Day With Meals.   Taking    clopidogrel (PLAVIX) 75 MG tablet Take 1 tablet by mouth Daily.   Taking    docusate sodium (COLACE) 100 MG capsule Take 1 capsule by mouth 3 (Three) Times a Day As Needed for Constipation.   Taking As Needed    empagliflozin (Jardiance) 10 MG tablet tablet Take 1 tablet by mouth Daily.   Taking    esomeprazole (nexIUM) 20 MG capsule Take 1 capsule by mouth Every Morning Before Breakfast.   Taking    gabapentin (NEURONTIN) 300 MG capsule Take 1 capsule by mouth 2 (Two) Times a Day.   Taking    hydrALAZINE (APRESOLINE) 100 MG tablet Take 1 tablet by mouth 3 (Three) Times a Day.   Taking    iron polysaccharides (NIFEREX) 150 MG capsule Take 1 capsule by mouth Daily.   Taking    isosorbide dinitrate (ISORDIL) 10 MG tablet Take 1 tablet by mouth 3 (Three) Times a Day. 270 tablet 2 Taking    levothyroxine (Synthroid) 100 MCG tablet Take 1 tablet by mouth Every Morning. 90 tablet 2 Taking    magnesium chloride ER 64 MG DR tablet Take 1 tablet by mouth Daily.   Taking    montelukast (SINGULAIR) 10 MG tablet Take 1 tablet by mouth Every Night.   Taking    Multiple Vitamins-Minerals (PRESERVISION/LUTEIN) capsule Take 1 capsule by mouth 2 (two) times a  day.   Taking    NIFEdipine XL (ADALAT CC) 60 MG 24 hr tablet Take 2 tablets by mouth Daily.   Taking    spironolactone (ALDACTONE) 25 MG tablet Take 1 tablet by mouth Daily.   Taking    tamsulosin (FLOMAX) 0.4 MG capsule 24 hr capsule Take 1 capsule by mouth Every Night.   Taking    valsartan (DIOVAN) 320 MG tablet Take 1 tablet by mouth Daily.   Taking    vitamin D (ERGOCALCIFEROL) 1.25 MG (70527 UT) capsule capsule Take 1 capsule by mouth 1 (One) Time Per Week. Mondays   Taking    albuterol sulfate  (90 Base) MCG/ACT inhaler Inhale 2 puffs Every 6 (Six) Hours As Needed for Wheezing or Shortness of Air.       bisacodyl (DULCOLAX) 10 MG suppository Insert 1 suppository into the rectum Daily As Needed for Constipation.       desoximetasone (TOPICORT) 0.25 % cream Apply 1 Application topically to the appropriate area as directed 2 (Two) Times a Day As Needed for Irritation. irritation 60 g 0     fluticasone (FLONASE) 50 MCG/ACT nasal spray Administer 2 sprays into the nostril(s) as directed by provider Daily As Needed for Allergies.       naloxone (NARCAN) 4 MG/0.1ML nasal spray Call 911. Don't prime. Celestine in 1 nostril for overdose. Repeat in 2-3 minutes in other nostril if no or minimal breathing/responsiveness. 2 each 0     polyethylene glycol (MIRALAX) 17 g packet Take 17 g by mouth Daily As Needed (constipation).       senna 8.6 MG tablet Take 2 tablets by mouth Daily As Needed for Constipation.       [DISCONTINUED] cloNIDine (CATAPRES) 0.3 MG tablet Take 1 tablet by mouth 3 times a day. (Patient not taking: Reported on 6/21/2025)   Not Taking    [DISCONTINUED] gabapentin (NEURONTIN) 300 MG capsule Take 1 capsule by mouth Every Night. 24 capsule 0     [DISCONTINUED] omeprazole (priLOSEC) 20 MG capsule Take 1 capsule by mouth Daily. (Patient not taking: Reported on 6/21/2025)   Not Taking    [DISCONTINUED] oxyCODONE-acetaminophen (PERCOCET) 5-325 MG per tablet Take 1 tablet by mouth Every 6 (Six) Hours As  Needed for Moderate Pain. 24 tablet 0        Medicines:  Current Facility-Administered Medications   Medication Dose Route Frequency Provider Last Rate Last Admin    acetaminophen (TYLENOL) tablet 650 mg  650 mg Oral Q4H PRN Blayne Zabala MD   650 mg at 06/23/25 1842    Or    acetaminophen (TYLENOL) 160 MG/5ML oral solution 650 mg  650 mg Oral Q4H PRN Blayne Zabala MD        Or    acetaminophen (TYLENOL) suppository 650 mg  650 mg Rectal Q4H PRN Blayne Zabala MD        aspirin EC tablet 81 mg  81 mg Oral Daily Blayne Zabala MD   81 mg at 06/24/25 0803    atorvastatin (LIPITOR) tablet 10 mg  10 mg Oral Nightly Blayne Zabala MD   10 mg at 06/24/25 2038    sennosides-docusate (PERICOLACE) 8.6-50 MG per tablet 2 tablet  2 tablet Oral BID Blayne Zabala MD   2 tablet at 06/24/25 2038    And    polyethylene glycol (MIRALAX) packet 17 g  17 g Oral Daily PRN Blayne Zabala MD        And    bisacodyl (DULCOLAX) EC tablet 5 mg  5 mg Oral Daily PRN Blayne Zabala MD        And    bisacodyl (DULCOLAX) suppository 10 mg  10 mg Rectal Daily PRN Blayne Zabala MD        carvedilol (COREG) tablet 12.5 mg  12.5 mg Oral BID With Meals Blayne Zabala MD   12.5 mg at 06/24/25 1812    clopidogrel (PLAVIX) tablet 75 mg  75 mg Oral Daily Blayne Zabala MD   75 mg at 06/24/25 0804    epoetin cecile-epbx (RETACRIT) injection 10,000 Units  10,000 Units Subcutaneous Once per day on Monday Wednesday Friday Blayne Zabala MD   10,000 Units at 06/23/25 1408    gabapentin (NEURONTIN) capsule 300 mg  300 mg Oral Nightly Blayne Zabala MD   300 mg at 06/24/25 2038    heparin (porcine) injection 3,200 Units  3,200 Units Intracatheter PRN Blayne Zabala MD   3,200 Units at 06/23/25 1308    [Held by provider] hydrALAZINE (APRESOLINE) tablet 100 mg  100 mg Oral TID Blayne Zabala MD   100 mg at 06/21/25 1941    isosorbide dinitrate (ISORDIL) tablet 10 mg  10 mg Oral TID - Nitrates Blayne Zabala MD   10 mg at 06/24/25  1812    levothyroxine (SYNTHROID, LEVOTHROID) tablet 100 mcg  100 mcg Oral Q AM Blayne Zabala MD   100 mcg at 06/25/25 0523    montelukast (SINGULAIR) tablet 10 mg  10 mg Oral Nightly Blayne Zabala MD   10 mg at 06/24/25 2038    naloxone (NARCAN) injection 0.4 mg  0.4 mg Intravenous Q5 Min PRN Blayne Zabala MD        NIFEdipine XL (PROCARDIA XL) 24 hr tablet 120 mg  120 mg Oral Daily Blayne Zabala MD   120 mg at 06/24/25 1812    nitroglycerin (NITROSTAT) SL tablet 0.4 mg  0.4 mg Sublingual Q5 Min PRN Blayne Zabala MD        oxyCODONE-acetaminophen (PERCOCET) 5-325 MG per tablet 1 tablet  1 tablet Oral Q4H PRN Blayne Zabala MD   1 tablet at 06/25/25 0902    promethazine (PHENERGAN) tablet 12.5 mg  12.5 mg Oral Q6H PRN Blayne Zabala MD   12.5 mg at 06/20/25 2243    sodium chloride 0.9 % flush 10 mL  10 mL Intravenous Q12H Blayne Zabala MD   10 mL at 06/24/25 2038    sodium chloride 0.9 % flush 10 mL  10 mL Intravenous PRN Blayne Zabala MD        sodium chloride 0.9 % infusion 40 mL  40 mL Intravenous PRN Blayne Zabala MD        [Held by provider] spironolactone (ALDACTONE) tablet 25 mg  25 mg Oral Daily Blayne Zabala MD   25 mg at 06/22/25 0905    tamsulosin (FLOMAX) 24 hr capsule 0.4 mg  0.4 mg Oral Nightly Blayne Zabala MD   0.4 mg at 06/24/25 2038    [Held by provider] valsartan (DIOVAN) tablet 320 mg  320 mg Oral Daily Blayne Zabala MD   320 mg at 06/22/25 0905       Past Medical History:  Past Medical History:   Diagnosis Date    3-vessel CAD 08/11/2020    Allergic rhinitis     Anemia     Anxiety disorder 04/27/2020    Arthritis     Asymmetrical sensorineural hearing loss 06/28/2017    Atherosclerosis of native artery of both lower extremities with intermittent claudication 07/18/2019    Avascular necrosis of femoral head, left 07/11/2020    right hip after surgery    Carotid stenosis     Chronic mucoid otitis media     Chronic rhinitis     COPD (chronic obstructive  pulmonary disease)     Coronary artery disease     HEART BYPASS 2004    Crohn's disease of large intestine with other complication 07/30/2020    Chronic diarrhea Colonoscopy July 2020 revealed mild patchy scattered hemosiderin staining with inflammation more so in rectosigmoid area.  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    Iron deficiency anemia 07/14/2020    Mixed hearing loss of left ear     PAD (peripheral artery disease) 08/11/2020    Perianal abscess     Pernicious anemia 08/17/2020    took shots but never diagnosed with b12 deficiency    Personal history of alcoholism 08/11/2020    quit drinking in 2013    Prostatic hypertrophy 08/11/2020    Sensorineural hearing loss     Sepsis with acute renal failure 09/15/2020    Shortness of breath 05/27/2021    Tinnitus     Ventricular tachycardia, nonsustained 07/14/2020    Weight loss 07/11/2020       Past Surgical History:  Past Surgical History:   Procedure Laterality Date    ABOVE KNEE AMPUTATION Right 6/24/2025    Procedure: right above knee amputation;  Surgeon: Blayne Zabala MD;  Location:  [facility] OR;  Service: Vascular;  Laterality: Right;    AORTOGRAM Right 4/25/2025    Procedure: RIGHT LOWER EXTREMITY ANGIOGRAM, SHOCKWAVE LITHOTRIPSY, BALLOON ANGIOPLASTY, MYNX CLOSURE;  Surgeon: Gil Pineda DO;  Location:  [facility]BRID OR;  Service: Vascular;  Laterality: Right;    AORTOGRAM Left 5/22/2025    Procedure: LEFT LOWER EXTREMITY ANGIOGRAM, SHOCKWAVE LITHOTRIPSY, BALLOON ANGIOPLASTY, STENT PLACEMENT, MYNX CLOSURE;  Surgeon: Blayne Zabala MD;  Location:  [facility];  Service: Vascular;   Laterality: Left;    AORTOGRAM Right 5/30/2025    Procedure: AORTOILIAC ANGIOGRAM WITH LEFT LOWER EXTREMITY ANGIOGRAM;  Surgeon: Gil Pineda DO;  Location: [facility] HYBRID OR;  Service: Vascular;  Laterality: Right;    AORTOGRAM Right 6/20/2025    Procedure: right lower extremity arteriogram, shockwave lithotripsy, balloon angioplasty, mynx closue;  Surgeon: Blayne Zabala MD;  Location: [facility] HYBRID OR;  Service: Vascular;  Laterality: Right;    ARTERY SURGERY  2021    right side on neck    CAROTID ENDARTERECTOMY Right 05/10/2021    Procedure: RIGHT CAROTID ENDARTERECTOMY WITH EEG;  Surgeon: Gil Pineda DO;  Location: [facility] HYBRID OR 12;  Service: Vascular;  Laterality: Right;    COLONOSCOPY N/A 07/02/2020    Procedure: COLONOSCOPY WITH ANESTHESIA;  Surgeon: Adrien Brewster MD;  Location: [facility] ENDOSCOPY;  Service: Gastroenterology;  Laterality: N/A;  pre op: diarrhea  post op: polyps  PCP: Joe Velasco MD    COLONOSCOPY N/A 10/13/2020    Procedure: COLONOSCOPY WITH ANESTHESIA;  Surgeon: Adrien Brewster MD;  Location: [facility] ENDOSCOPY;  Service: Gastroenterology;  Laterality: N/A;  Pre: Chronic Diarrhea, Crohn's  Post: AVM  Dr. Neftali Velasco  CO2 Inflation Used    COLONOSCOPY N/A 12/08/2023    Procedure: COLONOSCOPY WITH ANESTHESIA;  Surgeon: Adrien Brewster MD;  Location: [facility] ENDOSCOPY;  Service: Gastroenterology;  Laterality: N/A;  pre chrone's disease  post sub optimal prep, polyp, chrone's      CORONARY ARTERY BYPASS GRAFT  2003    x3    ENDOSCOPY N/A 11/02/2021    Procedure: ESOPHAGOGASTRODUODENOSCOPY WITH ANESTHESIA;  Surgeon: Bridger Bell MD;  Location: [facility] ENDOSCOPY;  Service: Gastroenterology;  Laterality: N/A;  pre anemia;gi bleed  post  gi bleed;schatski ring  Dr. ERIC Velasco    ENDOSCOPY N/A 10/10/2023    Procedure: ESOPHAGOGASTRODUODENOSCOPY WITH ANESTHESIA;  Surgeon: Adrien Brewster MD;  Location: [facility] ENDOSCOPY;  Service: Gastroenterology;   Laterality: N/A;  preop; anemia  postop esophagitis ; R/O barretts   PCP Randall Beata    EYE SURGERY Bilateral     catorac    INCISION AND DRAINAGE PERIRECTAL ABSCESS N/A 2017    Procedure: INCISION AND DRAINAGE OF JEET ANAL ABSCESS;  Surgeon: Lynette Smith MD;  Location:  [facility] OR;  Service:     INGUINAL HERNIA REPAIR Bilateral 2023    Procedure: INGUINAL HERNIA BILATERAL REPAIR LAPAROSCOPIC WITH DAVINCI ROBOT WITH MESH;  Surgeon: Tahira Rivera MD;  Location:  [facility] OR;  Service: Robotics - DaVinci;  Laterality: Bilateral;    INSERTION HEMODIALYSIS CATHETER N/A 2025    Procedure: HEMODIALYSIS CATHETER PLACEMENT;  Surgeon: Gil Pineda DO;  Location:  [facility];  Service: Vascular;  Laterality: N/A;    MYRINGOTOMY W/ TUBES Left 2017    06/10/2016    TONSILLECTOMY      TOTAL HIP ARTHROPLASTY Right        Family History  Family History   Problem Relation Age of Onset    Breast cancer Mother     Dementia Father     Glaucoma Father     No Known Problems Daughter     Colon polyps Neg Hx     Colon cancer Neg Hx        Social History  Social History     Socioeconomic History    Marital status:    Tobacco Use    Smoking status: Former     Current packs/day: 0.00     Average packs/day: 0.5 packs/day for 25.8 years (12.9 ttl pk-yrs)     Types: Cigarettes     Start date:      Quit date: 10/13/2013     Years since quittin.7     Passive exposure: Past    Smokeless tobacco: Never    Tobacco comments:     quit    Vaping Use    Vaping status: Former    Substances: Nicotine    Devices: Pre-filled or refillable cartridge   Substance and Sexual Activity    Alcohol use: Not Currently    Drug use: No    Sexual activity: Not Currently     Partners: Female       Review of Systems:  History obtained from chart review and the patient  General ROS: No fever or chills  Respiratory ROS: No cough, shortness of breath, wheezing  Cardiovascular ROS: No chest pain or  palpitations  Gastrointestinal ROS: No abdominal pain or melena  Genito-Urinary ROS: No dysuria or hematuria  Psych ROS: No anxiety and depression  14 point ROS reviewed with the patient and negative except as noted above and in the HPI unless unable to obtain.    Objective:  Patient Vitals for the past 24 hrs:   BP Temp Temp src Pulse Resp SpO2 Weight   06/25/25 0747 148/52 98.3 °F (36.8 °C) Oral 73 16 100 % --   06/25/25 0606 160/47 98.3 °F (36.8 °C) Oral 59 16 99 % --   06/25/25 0524 -- -- -- -- -- -- 43.2 kg (95 lb 4.8 oz)   06/24/25 2339 178/51 97.7 °F (36.5 °C) Oral 60 16 97 % --   06/24/25 1944 165/62 97.6 °F (36.4 °C) Oral 64 16 98 % --   06/24/25 1808 169/54 -- -- 59 -- -- --   06/24/25 1616 149/49 97.5 °F (36.4 °C) Oral 54 17 100 % --   06/24/25 1542 128/55 97.5 °F (36.4 °C) Oral 58 16 100 % --   06/24/25 1512 125/43 97.6 °F (36.4 °C) Oral 58 16 97 % --   06/24/25 1500 122/55 98.9 °F (37.2 °C) -- 58 16 98 % --   06/24/25 1450 121/54 -- -- 60 19 93 % --   06/24/25 1435 137/60 -- -- 58 14 93 % --   06/24/25 1420 139/52 -- -- 64 19 98 % --   06/24/25 1417 125/55 -- -- 63 19 100 % --   06/24/25 1415 134/57 -- -- 63 12 100 % --   06/24/25 1408 130/51 98.6 °F (37 °C) Temporal 60 12 100 % --   06/24/25 1229 132/54 99.1 °F (37.3 °C) Temporal 56 16 98 % --   06/24/25 1148 117/61 99 °F (37.2 °C) Temporal 61 16 97 % --   06/24/25 1034 (!) 110/33 98 °F (36.7 °C) Oral 55 17 -- --   06/24/25 1017 94/41 97.9 °F (36.6 °C) -- 63 17 97 % --       Intake/Output Summary (Last 24 hours) at 6/25/2025 0955  Last data filed at 6/25/2025 0606  Gross per 24 hour   Intake 872.92 ml   Output 1165 ml   Net -292.08 ml     General: awake/alert   Chest:  clear to auscultation bilaterally without respiratory distress  CVS: regular rate and rhythm  Abdominal: soft, nontender, positive bowel sounds  Extremities: right AKA  Skin: warm and dry without rash      Labs:  Results from last 7 days   Lab Units 06/25/25  0305 06/24/25  4656  "06/23/25  0339   WBC 10*3/mm3 6.97 7.88 6.84   HEMOGLOBIN g/dL 7.1* 7.5* 7.6*   HEMATOCRIT % 22.9* 23.6* 23.8*   PLATELETS 10*3/mm3 121* 107* 125*         Results from last 7 days   Lab Units 06/25/25  0305 06/24/25  0420 06/23/25  0339 06/22/25  0510   SODIUM mmol/L 135* 137 135* 136   POTASSIUM mmol/L 4.4 4.0 3.7 3.7   CHLORIDE mmol/L 104 105 101 102   CO2 mmol/L 19.0* 21.0* 22.0 22.0   BUN mg/dL 28.9* 24.2* 44.4* 37.5*   CREATININE mg/dL 2.89* 2.25* 3.81* 3.17*   CALCIUM mg/dL 8.6 8.6 8.6 9.2   EGFR mL/min/1.73 21.7* 29.3* 15.6* 19.4*   BILIRUBIN mg/dL 0.3  --  0.3 0.4   ALK PHOS U/L 127*  --  144* 174*   ALT (SGPT) U/L <5  --  <5 <5   AST (SGOT) U/L 24  --  12 16   GLUCOSE mg/dL 89 103* 99 107*       Radiology:   Imaging Results (Last 72 Hours)       Procedure Component Value Units Date/Time    IR Angiogram Extremity [[id number]] Resulted: 06/23/25 1608     Updated: 06/23/25 1608            Culture:  No results found for: \"BLOODCX\", \"URINECX\", \"WOUNDCX\", \"MRSACX\", \"RESPCX\", \"STOOLCX\"      Assessment    End stage renal disease on hemodialysis  Hypertension  Anemia of CKD  CLL  Severe peripheral vascular disease--s/p multiple vascular procedures  S/p right AKA on 6/24  Chronic diastolic CHF    Plan:   Dialysis today  Monitor labs      Slava Tate, APRN  6/25/2025  09:55 CDT    "

## 2025-06-25 NOTE — PLAN OF CARE
Goal Outcome Evaluation:  Plan of Care Reviewed With: patient        Progress: no change  Outcome Evaluation: Pt resting intermittently. C/o pain often, see MAR. 2 doses of morphine given per  one time orders. Hypertensive. Refusing turns. Dressing c/d/i to AKA. Tele-NSR. Surya patent. Dialysis today. Safety maintained.

## 2025-06-26 LAB
ALBUMIN SERPL-MCNC: 3 G/DL (ref 3.5–5.2)
ALBUMIN/GLOB SERPL: 1.1 G/DL
ALP SERPL-CCNC: 149 U/L (ref 39–117)
ALT SERPL W P-5'-P-CCNC: <5 U/L (ref 1–41)
ANION GAP SERPL CALCULATED.3IONS-SCNC: 11 MMOL/L (ref 5–15)
AST SERPL-CCNC: 34 U/L (ref 1–40)
BASOPHILS # BLD AUTO: 0.05 10*3/MM3 (ref 0–0.2)
BASOPHILS NFR BLD AUTO: 0.5 % (ref 0–1.5)
BILIRUB SERPL-MCNC: 0.6 MG/DL (ref 0–1.2)
BUN SERPL-MCNC: 18.7 MG/DL (ref 8–23)
BUN/CREAT SERPL: 9.4 (ref 7–25)
CALCIUM SPEC-SCNC: 8.6 MG/DL (ref 8.6–10.5)
CHLORIDE SERPL-SCNC: 99 MMOL/L (ref 98–107)
CO2 SERPL-SCNC: 22 MMOL/L (ref 22–29)
CREAT SERPL-MCNC: 1.98 MG/DL (ref 0.76–1.27)
DEPRECATED RDW RBC AUTO: 64.5 FL (ref 37–54)
EGFRCR SERPLBLD CKD-EPI 2021: 34.2 ML/MIN/1.73
EOSINOPHIL # BLD AUTO: 0.45 10*3/MM3 (ref 0–0.4)
EOSINOPHIL NFR BLD AUTO: 4.4 % (ref 0.3–6.2)
ERYTHROCYTE [DISTWIDTH] IN BLOOD BY AUTOMATED COUNT: 18.5 % (ref 12.3–15.4)
GLOBULIN UR ELPH-MCNC: 2.8 GM/DL
GLUCOSE SERPL-MCNC: 113 MG/DL (ref 65–99)
HCT VFR BLD AUTO: 26.9 % (ref 37.5–51)
HGB BLD-MCNC: 8.8 G/DL (ref 13–17.7)
IMM GRANULOCYTES # BLD AUTO: 0.17 10*3/MM3 (ref 0–0.05)
IMM GRANULOCYTES NFR BLD AUTO: 1.7 % (ref 0–0.5)
LYMPHOCYTES # BLD AUTO: 1.2 10*3/MM3 (ref 0.7–3.1)
LYMPHOCYTES NFR BLD AUTO: 11.7 % (ref 19.6–45.3)
MCH RBC QN AUTO: 32 PG (ref 26.6–33)
MCHC RBC AUTO-ENTMCNC: 32.7 G/DL (ref 31.5–35.7)
MCV RBC AUTO: 97.8 FL (ref 79–97)
MONOCYTES # BLD AUTO: 1.15 10*3/MM3 (ref 0.1–0.9)
MONOCYTES NFR BLD AUTO: 11.2 % (ref 5–12)
NEUTROPHILS NFR BLD AUTO: 7.23 10*3/MM3 (ref 1.7–7)
NEUTROPHILS NFR BLD AUTO: 70.5 % (ref 42.7–76)
NRBC BLD AUTO-RTO: 0 /100 WBC (ref 0–0.2)
PLATELET # BLD AUTO: 118 10*3/MM3 (ref 140–450)
PMV BLD AUTO: 9.8 FL (ref 6–12)
POTASSIUM SERPL-SCNC: 4.1 MMOL/L (ref 3.5–5.2)
PROT SERPL-MCNC: 5.8 G/DL (ref 6–8.5)
RBC # BLD AUTO: 2.75 10*6/MM3 (ref 4.14–5.8)
SODIUM SERPL-SCNC: 132 MMOL/L (ref 136–145)
WBC NRBC COR # BLD AUTO: 10.25 10*3/MM3 (ref 3.4–10.8)

## 2025-06-26 PROCEDURE — 36430 TRANSFUSION BLD/BLD COMPNT: CPT

## 2025-06-26 PROCEDURE — 25010000002 HYDRALAZINE PER 20 MG

## 2025-06-26 PROCEDURE — 86900 BLOOD TYPING SEROLOGIC ABO: CPT

## 2025-06-26 PROCEDURE — 80053 COMPREHEN METABOLIC PANEL: CPT | Performed by: NURSE PRACTITIONER

## 2025-06-26 PROCEDURE — P9016 RBC LEUKOCYTES REDUCED: HCPCS

## 2025-06-26 PROCEDURE — 85025 COMPLETE CBC W/AUTO DIFF WBC: CPT | Performed by: NURSE PRACTITIONER

## 2025-06-26 PROCEDURE — 97162 PT EVAL MOD COMPLEX 30 MIN: CPT | Performed by: PHYSICAL THERAPIST

## 2025-06-26 PROCEDURE — 25010000002 MORPHINE PER 10 MG: Performed by: INTERNAL MEDICINE

## 2025-06-26 RX ORDER — HYDRALAZINE HYDROCHLORIDE 20 MG/ML
10 INJECTION INTRAMUSCULAR; INTRAVENOUS ONCE AS NEEDED
Status: COMPLETED | OUTPATIENT
Start: 2025-06-26 | End: 2025-06-26

## 2025-06-26 RX ADMIN — GABAPENTIN 300 MG: 300 CAPSULE ORAL at 20:26

## 2025-06-26 RX ADMIN — CLOPIDOGREL BISULFATE 75 MG: 75 TABLET, FILM COATED ORAL at 08:51

## 2025-06-26 RX ADMIN — OXYCODONE AND ACETAMINOPHEN 1 TABLET: 5; 325 TABLET ORAL at 01:45

## 2025-06-26 RX ADMIN — DOCUSATE SODIUM 50 MG AND SENNOSIDES 8.6 MG 2 TABLET: 8.6; 5 TABLET, FILM COATED ORAL at 08:51

## 2025-06-26 RX ADMIN — CARVEDILOL 12.5 MG: 6.25 TABLET, FILM COATED ORAL at 08:51

## 2025-06-26 RX ADMIN — MONTELUKAST SODIUM 10 MG: 10 TABLET, COATED ORAL at 20:26

## 2025-06-26 RX ADMIN — Medication 10 ML: at 20:26

## 2025-06-26 RX ADMIN — ATORVASTATIN CALCIUM 10 MG: 10 TABLET, FILM COATED ORAL at 20:26

## 2025-06-26 RX ADMIN — ASPIRIN 81 MG: 81 TABLET, COATED ORAL at 08:51

## 2025-06-26 RX ADMIN — Medication 10 ML: at 08:52

## 2025-06-26 RX ADMIN — DOCUSATE SODIUM 50 MG AND SENNOSIDES 8.6 MG 2 TABLET: 8.6; 5 TABLET, FILM COATED ORAL at 20:25

## 2025-06-26 RX ADMIN — OXYCODONE AND ACETAMINOPHEN 1 TABLET: 5; 325 TABLET ORAL at 06:20

## 2025-06-26 RX ADMIN — TAMSULOSIN HYDROCHLORIDE 0.4 MG: 0.4 CAPSULE ORAL at 20:26

## 2025-06-26 RX ADMIN — MORPHINE SULFATE 1 MG: 2 INJECTION, SOLUTION INTRAMUSCULAR; INTRAVENOUS at 02:59

## 2025-06-26 RX ADMIN — ISOSORBIDE DINITRATE 10 MG: 10 TABLET ORAL at 08:51

## 2025-06-26 RX ADMIN — LEVOTHYROXINE SODIUM 100 MCG: 0.1 TABLET ORAL at 05:20

## 2025-06-26 RX ADMIN — HYDRALAZINE HYDROCHLORIDE 10 MG: 20 INJECTION INTRAMUSCULAR; INTRAVENOUS at 03:28

## 2025-06-26 RX ADMIN — OXYCODONE AND ACETAMINOPHEN 1 TABLET: 5; 325 TABLET ORAL at 10:28

## 2025-06-26 NOTE — THERAPY EVALUATION
Patient Name: Ricardo Hugo  : 1950    MRN: [id number]                              Today's Date: 2025       Admit Date: 2025    Visit Dx:     ICD-10-CM ICD-9-CM   1. Vascular occlusion  I99.8 459.9   2. Arterial occlusion  I70.90 444.9   3. Dialysis patient  Z99.2 V45.11   4. Impaired mobility [Z74.09]  Z74.09 799.89   5. Severe protein-calorie malnutrition  E43 262   6. Thrombocytopenia  D69.6 287.5   7. Hyperkalemia  E87.5 276.7   8. Acute pain of left lower extremity  M79.605 729.5   9. Anemia, unspecified type  D64.9 285.9   10. Preop testing  Z01.818 V72.84   11. ESRD (end stage renal disease) on dialysis  N18.6 585.6    Z99.2 V45.11   12. Abnormal TSH  R79.89 790.6   13. History of pneumonia, current treatment with cefdinir  Z87.01 V12.61   14. Bradycardia  R00.1 427.89   15. Chronic diastolic (congestive) heart failure  I50.32 428.32     428.0   16. Persistent proteinuria  R80.1 791.0   17. Dermatitis associated with moisture  L30.8 692.89   18. Sleep difficulties  G47.9 780.50   19. Bilateral inguinal hernia without obstruction or gangrene, recurrence not specified  K40.20 550.92   20. Unilateral recurrent inguinal hernia without obstruction or gangrene  K40.91 550.91   21. Hypertrophy of inferior nasal turbinate  J34.3 478.0   22. Nasal septal deviation  J34.2 470   23. Anticoagulated  Z79.01 V58.61   24. Sensorineural hearing loss (SNHL), bilateral  H90.3 389.18   25. Eustachian tube dysfunction, bilateral  H69.93 381.81   26. Systolic murmur  R01.1 785.2   27. Mixed hyperlipidemia  E78.2 272.2   28. Perforation of left tympanic membrane  H72.92 384.20   29. CLL (chronic lymphocytic leukemia)  C91.10 204.10   30. Low iron   E61.1 280.9   31. Copper deficiency  E61.0 275.1   32. Anemia due to chronic kidney disease, on chronic dialysis  N18.6 585.6    D63.1 285.21    Z99.2 V45.11   33. CKD (chronic kidney disease) stage 5  N18.4 585.4   34. Diastolic dysfunction  I51.89 429.9   35.  Carotid stenosis, right  I65.21 433.10   36. Stenosis of right carotid artery  I65.21 433.10   37. Bilateral carotid artery stenosis  I65.23 433.10     433.30   38. IHD (ischemic heart disease)  I25.9 414.9   39. Peripheral arterial disease  I73.9 443.9   40. Wellness examination  Z00.00 V70.0   41. Symptomatic anemia  D64.9 285.9   42. Chronic diarrhea  K52.9 787.91   43. Resistant hypertension  I1A.0 401.9   44. Essential hypertension  I10 401.9   45. Chronic rhinitis  J31.0 472.0     Patient Active Problem List   Diagnosis    Chronic rhinitis    Essential hypertension    Resistant hypertension    Chronic diarrhea    Symptomatic anemia    Wellness examination    Peripheral arterial disease    IHD (ischemic heart disease)    Carotid stenosis    Stenosis of right carotid artery    Carotid stenosis, right    Diastolic dysfunction    CKD (chronic kidney disease) stage 5    Anemia due to chronic kidney disease    Copper deficiency    Low iron     CLL (chronic lymphocytic leukemia)    Perforation of left tympanic membrane    Mixed hyperlipidemia    Systolic murmur    Eustachian tube dysfunction, bilateral    Sensorineural hearing loss (SNHL), bilateral    Anticoagulated    Nasal septal deviation    Hypertrophy of inferior nasal turbinate    Unilateral recurrent inguinal hernia without obstruction or gangrene    Bilateral inguinal hernia without obstruction or gangrene    Sleep difficulties    Dermatitis associated with moisture    Proteinuria    Chronic diastolic (congestive) heart failure    Bradycardia    History of pneumonia, current treatment with cefdinir    Abnormal TSH    ESRD (end stage renal disease) on dialysis    Preop testing    Anemia, multifactorial to include chronic kidney disease, iron deficiency and possible bleed    Acute pain of left lower extremity    Hyperkalemia    Arterial occlusion    Thrombocytopenia    Severe protein-calorie malnutrition    Chronic heart failure with preserved ejection  fraction (HFpEF)    Vascular occlusion     Past Medical History:   Diagnosis Date    3-vessel CAD 08/11/2020    Allergic rhinitis     Anemia     Anxiety disorder 04/27/2020    Arthritis     Asymmetrical sensorineural hearing loss 06/28/2017    Atherosclerosis of native artery of both lower extremities with intermittent claudication 07/18/2019    Avascular necrosis of femoral head, left 07/11/2020    right hip after surgery    Carotid stenosis     Chronic mucoid otitis media     Chronic rhinitis     COPD (chronic obstructive pulmonary disease)     Coronary artery disease     HEART BYPASS 2004    Crohn's disease of large intestine with other complication 07/30/2020    Chronic diarrhea Colonoscopy July 2020 revealed mild patchy scattered hemosiderin staining with inflammation more so in rectosigmoid area.  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    Iron deficiency anemia 07/14/2020    Mixed hearing loss of left ear     PAD (peripheral artery disease) 08/11/2020    Perianal abscess     Pernicious anemia 08/17/2020    took shots but never diagnosed with b12 deficiency    Personal history of alcoholism 08/11/2020    quit drinking in 2013    Prostatic hypertrophy 08/11/2020    Sensorineural hearing loss     Sepsis with acute renal failure 09/15/2020    Shortness of breath 05/27/2021    Tinnitus     Ventricular tachycardia, nonsustained 07/14/2020    Weight loss 07/11/2020     Past Surgical History:   Procedure Laterality Date    ABOVE KNEE AMPUTATION Right 6/24/2025    Procedure: right above knee amputation;  Surgeon: Blayne Zabala MD;  Location:  [facility] OR;   Service: Vascular;  Laterality: Right;    AORTOGRAM Right 4/25/2025    Procedure: RIGHT LOWER EXTREMITY ANGIOGRAM, SHOCKWAVE LITHOTRIPSY, BALLOON ANGIOPLASTY, MYNX CLOSURE;  Surgeon: Gil Pineda DO;  Location: [facility] HYBRID OR;  Service: Vascular;  Laterality: Right;    AORTOGRAM Left 5/22/2025    Procedure: LEFT LOWER EXTREMITY ANGIOGRAM, SHOCKWAVE LITHOTRIPSY, BALLOON ANGIOPLASTY, STENT PLACEMENT, MYNX CLOSURE;  Surgeon: Blayne Zabala MD;  Location: [facility] HYBRID OR;  Service: Vascular;  Laterality: Left;    AORTOGRAM Right 5/30/2025    Procedure: AORTOILIAC ANGIOGRAM WITH LEFT LOWER EXTREMITY ANGIOGRAM;  Surgeon: Gil Pineda DO;  Location: [facility] HYBRID OR;  Service: Vascular;  Laterality: Right;    AORTOGRAM Right 6/20/2025    Procedure: right lower extremity arteriogram, shockwave lithotripsy, balloon angioplasty, mynx closue;  Surgeon: Blayne Zabala MD;  Location: [facility] HYBRID OR;  Service: Vascular;  Laterality: Right;    ARTERY SURGERY  2021    right side on neck    CAROTID ENDARTERECTOMY Right 05/10/2021    Procedure: RIGHT CAROTID ENDARTERECTOMY WITH EEG;  Surgeon: Gil Pineda DO;  Location: [facility] HYBRID OR 12;  Service: Vascular;  Laterality: Right;    COLONOSCOPY N/A 07/02/2020    Procedure: COLONOSCOPY WITH ANESTHESIA;  Surgeon: Adrien Brewster MD;  Location: [facility] ENDOSCOPY;  Service: Gastroenterology;  Laterality: N/A;  pre op: diarrhea  post op: polyps  PCP: Joe Velasco MD    COLONOSCOPY N/A 10/13/2020    Procedure: COLONOSCOPY WITH ANESTHESIA;  Surgeon: Adrien Brewster MD;  Location: [facility] ENDOSCOPY;  Service: Gastroenterology;  Laterality: N/A;  Pre: Chronic Diarrhea, Crohn's  Post: AVM  Dr. Neftali Velasco  CO2 Inflation Used    COLONOSCOPY N/A 12/08/2023    Procedure: COLONOSCOPY WITH ANESTHESIA;  Surgeon: Adrien Brewster MD;  Location: [facility] ENDOSCOPY;  Service: Gastroenterology;  Laterality: N/A;  pre chrone's disease  post sub optimal prep, polyp,  jeffrey Murrell    CORONARY ARTERY BYPASS GRAFT  2003    x3    ENDOSCOPY N/A 11/02/2021    Procedure: ESOPHAGOGASTRODUODENOSCOPY WITH ANESTHESIA;  Surgeon: Bridger Bell MD;  Location: [facility] ENDOSCOPY;  Service: Gastroenterology;  Laterality: N/A;  pre anemia;gi bleed  post  gi bleed;schatski ring  Dr. ERIC Velasco    ENDOSCOPY N/A 10/10/2023    Procedure: ESOPHAGOGASTRODUODENOSCOPY WITH ANESTHESIA;  Surgeon: Adrien Brewster MD;  Location: [facility] ENDOSCOPY;  Service: Gastroenterology;  Laterality: N/A;  preop; anemia  postop esophagitis ; R/O barretts   PCP Randall Beata    EYE SURGERY Bilateral     catorac    INCISION AND DRAINAGE PERIRECTAL ABSCESS N/A 03/03/2017    Procedure: INCISION AND DRAINAGE OF JEET ANAL ABSCESS;  Surgeon: Lynette Smith MD;  Location: [facility] OR;  Service:     INGUINAL HERNIA REPAIR Bilateral 06/27/2023    Procedure: INGUINAL HERNIA BILATERAL REPAIR LAPAROSCOPIC WITH DAVINCI ROBOT WITH MESH;  Surgeon: Tahira Rivera MD;  Location: [facility] OR;  Service: Robotics - DaVinci;  Laterality: Bilateral;    INSERTION HEMODIALYSIS CATHETER N/A 4/16/2025    Procedure: HEMODIALYSIS CATHETER PLACEMENT;  Surgeon: Gil Pineda DO;  Location: [facility] HYBRID OR;  Service: Vascular;  Laterality: N/A;    MYRINGOTOMY W/ TUBES Left 04/17/2017    06/10/2016    TONSILLECTOMY      TOTAL HIP ARTHROPLASTY Right 2006      General Information       Row Name 06/26/25 1131          Physical Therapy Time and Intention    Document Type evaluation  pt presents to hospital w/ right foot pain. PMH: oronary artery disease/CVA EEG, peripheral vascular disease, end-stage renal disease, COPD. RLE arteriogram shockwave angioplasty of the popliteal and tibial vessels. sx on 6/21. R AKA on 6/24  -SB (r) AD (t) SB (c)     Mode of Treatment physical therapy  -SB (r) AD (t) SB (c)       Row Name 06/26/25 1133          General Information    Patient Profile Reviewed yes  -SB (r) AD (t) SB (c)     Prior Level of  Function independent:;all household mobility;gait;transfer;community mobility;bed mobility;ADL's;bathing;dressing;grooming;driving;cleaning  hospital bed, trapeeze bar going to be purchased, multiple walkers, w/c, grab bars, need w/c accessible bathroom. walk in shower.  -SB (r) AD (t) SB (c)     Existing Precautions/Restrictions fall;other (see comments)  R AKA  -SB (r) AD (t) SB (c)     Barriers to Rehab medically complex;physical barrier  -SB (r) AD (t) SB (c)       Row Name 06/26/25 1131          Living Environment    Current Living Arrangements extended care facility  -SB (r) AD (t) SB (c)     People in Home facility resident  -SB (r) AD (t) SB (c)       Row Name 06/26/25 1131          Home Main Entrance    Number of Stairs, Main Entrance none  -SB (r) AD (t) SB (c)     Stair Railings, Main Entrance none  -SB (r) AD (t) SB (c)       Row Name 06/26/25 1131          Stairs Within Home, Primary    Number of Stairs, Within Home, Primary none  -SB (r) AD (t) SB (c)     Stair Railings, Within Home, Primary none  -SB (r) AD (t) SB (c)       Row Name 06/26/25 1131          Cognition    Orientation Status (Cognition) oriented to;person  -SB (r) AD (t) SB (c)       Row Name 06/26/25 1131          Safety Issues/Impairments Affecting Functional Mobility    Impairments Affecting Function (Mobility) cognition;endurance/activity tolerance;strength;pain;balance  -SB (r) AD (t) SB (c)     Cognitive Impairments, Mobility Safety/Performance attention;insight into deficits/self-awareness;judgment  -SB (r) AD (t) SB (c)               User Key  (r) = Recorded By, (t) = Taken By, (c) = Cosigned By      Initials Name Provider Type    Raiza Michael, PT DPT Physical Therapist    Yon Flores, PT Student PT Student                   Mobility       Row Name 06/26/25 1131          Bed Mobility    Bed Mobility supine-sit;sit-supine  -SB (r) AD (t) SB (c)     Rolling Right [place] (Bed Mobility) --  -SB (r) AD (t) SB (c)      Supine-Sit [place] (Bed Mobility) minimum assist (75% patient effort)  -SB (r) AD (t) SB (c)     Sit-Supine [place] (Bed Mobility) moderate assist (50% patient effort)  -SB (r) AD (t) SB (c)     Assistive Device (Bed Mobility) bed rails;head of bed elevated  -SB (r) AD (t) SB (c)       Row Name 06/26/25 1131          Bed-Chair Transfer    Bed-Chair [place] (Transfers) not tested  -SB (r) AD (t) SB (c)       Row Name 06/26/25 1131          Sit-Stand Transfer    Sit-Stand [place] (Transfers) not tested  -SB (r) AD (t) SB (c)       Row Name 06/26/25 1131          Gait/Stairs (Locomotion)    [place] Level (Gait) not tested  -SB (r) AD (t) SB (c)     Patient was able to Ambulate no, other medical factors prevent ambulation  -SB (r) AD (t) SB (c)     Reason Patient was unable to Ambulate Excessive Sedation/Somnolence  wouldn't stay awake enough to stand  -SB (r) AD (t) SB (c)     [place] Level (Stairs) not tested  -SB (r) AD (t) SB (c)               User Key  (r) = Recorded By, (t) = Taken By, (c) = Cosigned By      Initials Name Provider Type    Raiza Michael, PT DPT Physical Therapist    Yon Flores, KELLY Student PT Student                   Obj/Interventions       Row Name 06/26/25 1131          Range of Motion Comprehensive    General Range of Motion no range of motion deficits identified  -SB (r) AD (t) SB (c)       Row Name 06/26/25 1131          Strength Comprehensive (MMT)    General Manual Muscle Testing (MMT) Assessment lower extremity strength deficits identified  -SB (r) AD (t) SB (c)     Comment, General Manual Muscle Testing (MMT) Assessment LLE grossly at 4/5, R hip flex and ext 3/5 w/ pain reproduction  -SB (r) AD (t) SB (c)       Row Name 06/26/25 1131          Balance    Balance Assessment sitting static balance;sitting dynamic balance  -SB (r) AD (t) SB (c)     Static Sitting Balance standby assist  -SB (r) AD (t) SB (c)     Dynamic Sitting Balance contact guard   -SB (r) AD (t) SB (c)     Position, Sitting Balance unsupported;sitting edge of bed  -SB (r) AD (t) SB (c)       Row Name 06/26/25 1131          Sensory Assessment (Somatosensory)    Sensory Assessment (Somatosensory) LE sensation intact  pt didn't report abnormal sensation  -SB (r) AD (t) SB (c)               User Key  (r) = Recorded By, (t) = Taken By, (c) = Cosigned By      Initials Name Provider Type    SB Raiza Cancino, PT DPT Physical Therapist    Yon Flores, PT Student PT Student                   Goals/Plan       Row Name 06/26/25 1131          Bed Mobility Goal 1 (PT)    Activity/Assistive Device (Bed Mobility Goal 1, PT) rolling to left;rolling to right;sit to supine/supine to sit;sidelying to sit/sit to sidelying  -SB (r) AD (t) SB (c)     [place] Level/Cues Needed (Bed Mobility Goal 1, PT) independent  -SB (r) AD (t) SB (c)     Time Frame (Bed Mobility Goal 1, PT) long term goal (LTG)  -SB (r) AD (t) SB (c)     Progress/Outcomes (Bed Mobility Goal 1, PT) new goal  -SB (r) AD (t) SB (c)       Row Name 06/26/25 1131          Transfer Goal 1 (PT)    Activity/Assistive Device (Transfer Goal 1, PT) sit-to-stand/stand-to-sit;bed-to-chair/chair-to-bed;walker, rolling  -SB (r) AD (t) SB (c)     [place] Level/Cues Needed (Transfer Goal 1, PT) modified independence  -SB (r) AD (t) SB (c)     Time Frame (Transfer Goal 1, PT) long term goal (LTG)  -SB (r) AD (t) SB (c)     Progress/Outcome (Transfer Goal 1, PT) new goal  -SB (r) AD (t) SB (c)       Row Name 06/26/25 1131          Balance Goal 1 (PT)    Activity/Assistive Device (Balance Goal) sitting static balance;sitting dynamic balance;standing static balance;standing dynamic balance;walker, rolling  -SB (r) AD (t) SB (c)     [place] Level/Cues Needed (Balance Goal 1, PT) modified independence  -SB (r) AD (t) SB (c)     Time Frame (Balance Goal 1, PT) long-term goal (LTG)  -SB (r) AD (t) SB (c)     Progress/Outcomes (Balance Goal 1, PT)  goal ongoing  -SB (r) AD (t) SB (c)       Row Name 06/26/25 1131          Therapy Assessment/Plan (PT)    Planned Therapy Interventions (PT) balance training;bed mobility training;patient/family education;strengthening;transfer training;wheelchair management/propulsion training  -SB (r) AD (t) SB (c)               User Key  (r) = Recorded By, (t) = Taken By, (c) = Cosigned By      Initials Name Provider Type    SB Raiza Cancino, PT DPT Physical Therapist    Yon Flores, PT Student PT Student                   Clinical Impression       Row Name 06/26/25 1131          Pain    Pretreatment Pain Rating 0/10 - no pain  -SB (r) AD (t) SB (c)     Posttreatment Pain Rating 0/10 - no pain  -SB (r) AD (t) SB (c)       Row Name 06/26/25 1131          Plan of Care Review    Plan of Care Reviewed With patient;child  -SB (r) AD (t) SB (c)     Progress no change  -SB (r) AD (t) SB (c)     Outcome Evaluation PT eval completed. Pt is only oriented to self. Pt was difficult to arouse, so historian was daughter present at bedside. Pt demonstrated LLE mm strength grossly at 4/5, R hip flex and ext 3/5 w/ pain reproduction. Pt does not report atypical sensation in BLE. Pt demo'd bed mob w/ min A w/ HOB elevated, bed rails, and increased time do to lethargy. Pt tolerated sitting EOB for 2 mins before requesting to lay back down. Pt required mod assist to return to supine. Skilled therapy is indicated to improve strength, confidence w/ transfers, and indep w/ amb and mob to return to PLOF. Rec d/c to SNF.  -SB (r) AD (t) SB (c)       Row Name 06/26/25 1131          Therapy Assessment/Plan (PT)    Patient/Family Therapy Goals Statement (PT) get better  -SB (r) AD (t) SB (c)     Rehab Potential (PT) good  -SB (r) AD (t) SB (c)     Criteria for Skilled Interventions Met (PT) skilled treatment is necessary;yes;meets criteria  -SB (r) AD (t) SB (c)     Therapy Frequency (PT) 2 times/day  -SB (r) AD (t) SB (c)     Predicted Duration of  Therapy Intervention (PT) until d/c or goals met  -SB (r) AD (t) SB (c)       Row Name 06/26/25 1131          Positioning and Restraints    Pre-Treatment Position in bed  -SB (r) AD (t) SB (c)     Post Treatment Position bed  -SB (r) AD (t) SB (c)     In Bed fowlers;call light within reach;side rails up x2;encouraged to call for assist;notified nsg;with family/caregiver  -SB (r) AD (t) SB (c)               User Key  (r) = Recorded By, (t) = Taken By, (c) = Cosigned By      Initials Name Provider Type    SB Raiza Cancino, PT DPT Physical Therapist    Yon Flores, PT Student PT Student                   Outcome Measures       Row Name 06/26/25 1131 06/26/25 0800       How much help from another person do you currently need...    Turning from your back to your side while in flat bed without using bedrails? 3  -SB (r) AD (t) SB (c) 3  -KJ    Moving from lying on back to sitting on the side of a flat bed without bedrails? 3  -SB (r) AD (t) SB (c) 3  -KJ    Moving to and from a bed to a chair (including a wheelchair)? 2  -SB (r) AD (t) SB (c) 3  -KJ    Standing up from a chair using your arms (e.g., wheelchair, bedside chair)? 2  -SB (r) AD (t) SB (c) 3  -KJ    Climbing 3-5 steps with a railing? 1  -SB (r) AD (t) SB (c) 3  -KJ    To walk in hospital room? 1  -SB (r) AD (t) SB (c) 3  -KJ    AM-PAC 6 Clicks Score (PT) 12  -SB (r) AD (t) 18  -KJ    Highest Level of Mobility Goal Move to Chair/Commode-4  -SB (r) AD (t) Walk 10 Steps or More-6  -KJ      Row Name 06/26/25 1131          Functional Assessment    Outcome Measure Options AM-PAC 6 Clicks Basic Mobility (PT)  -SB (r) AD (t) SB (c)               User Key  (r) = Recorded By, (t) = Taken By, (c) = Cosigned By      Initials Name Provider Type    Dary Renteria, RN Registered Nurse    Raiza Michael, PT DPT Physical Therapist    Yon Flores, PT Student PT Student                                 Physical Therapy Education       Title: PT OT SLP  Therapies (Done)       Topic: Physical Therapy (Done)       Point: Mobility training (Done)       Learning Progress Summary            Patient Acceptance, E, NR,VU by AD at 6/26/2025 1436    Comment: POC, d/c plans, body mechanics, positioning techniques   Family Acceptance, E, NR,VU by AD at 6/26/2025 1436    Comment: POC, d/c plans, body mechanics, positioning techniques                      Point: Home exercise program (Done)       Learning Progress Summary            Patient Acceptance, E, NR,VU by AD at 6/26/2025 1436    Comment: POC, d/c plans, body mechanics, positioning techniques   Family Acceptance, E, NR,VU by AD at 6/26/2025 1436    Comment: POC, d/c plans, body mechanics, positioning techniques                      Point: Body mechanics (Done)       Learning Progress Summary            Patient Acceptance, E, NR,VU by AD at 6/26/2025 1436    Comment: POC, d/c plans, body mechanics, positioning techniques   Family Acceptance, E, NR,VU by AD at 6/26/2025 1436    Comment: POC, d/c plans, body mechanics, positioning techniques                      Point: Precautions (Done)       Learning Progress Summary            Patient Acceptance, E, NR,VU by AD at 6/26/2025 1436    Comment: POC, d/c plans, body mechanics, positioning techniques   Family Acceptance, E, NR,VU by AD at 6/26/2025 1436    Comment: POC, d/c plans, body mechanics, positioning techniques                                      User Key       Initials Effective Dates Name Provider Type Discipline    AD 04/21/25 -  Yon Magaña, PT Student PT Student PT                  PT Recommendation and Plan  Planned Therapy Interventions (PT): balance training, bed mobility training, patient/family education, strengthening, transfer training, wheelchair management/propulsion training  Progress: no change  Outcome Evaluation: PT eval completed. Pt is only oriented to self. Pt was difficult to arouse, so historian was daughter present at bedside. Pt  demonstrated LLE mm strength grossly at 4/5, R hip flex and ext 3/5 w/ pain reproduction. Pt does not report atypical sensation in BLE. Pt demo'd bed mob w/ min A w/ HOB elevated, bed rails, and increased time do to lethargy. Pt tolerated sitting EOB for 2 mins before requesting to lay back down. Pt required mod assist to return to supine. Skilled therapy is indicated to improve strength, confidence w/ transfers, and indep w/ amb and mob to return to PLOF. Rec d/c to SNF.     Time Calculation:         PT Charges       Row Name 06/26/25 0288             Time Calculation    Start Time 1130  -SB (r) AD (t) SB (c)      Stop Time 1230  -SB (r) AD (t) SB (c)      Time Calculation (min) 60 min  -SB (r) AD (t)      PT Received On 06/26/25  -SB (r) AD (t) SB (c)      PT Goal Re-Cert Due Date 07/06/25  -SB (r) AD (t) SB (c)                User Key  (r) = Recorded By, (t) = Taken By, (c) = Cosigned By      Initials Name Provider Type    Raiza Michael, PT DPT Physical Therapist    Yon Flores, PT Student PT Student                      PT G-Codes  Outcome Measure Options: AM-PAC 6 Clicks Basic Mobility (PT)  AM-PAC 6 Clicks Score (PT): 12  PT Discharge Summary  Anticipated Discharge Disposition (PT): skilled nursing facility    Yon Reese PT Student  6/26/2025

## 2025-06-26 NOTE — PROGRESS NOTES
[facility]  INPATIENT PROGRESS NOTE    Patient Name: Ricardo Hugo  Date of Admission: 6/19/2025  Today's Date: 06/26/25  Length of Stay: 7  Primary Care Physician: Nathan Zimmer MD    Subjective   Chief Complaint: f/u PAD/occlusion    HPI     He is on day 7 of hospitalization  Presented with right foot pain  Admitted with provisional diagnosis of vascular occlusion of right lower extremity    Multiple comorbidities:   End-stage renal disease on dialysis  Hypertension  COPD not in exacerbation    Procedure:    6/20/25  PREOPERATIVE DIAGNOSIS: Vascular occlusion [I99.8]  CL TI right lower extremity rest pain     POSTOPERATIVE DIAGNOSIS: Post-Op Diagnosis Codes:     * Vascular occlusion [I99.8]  Same     PROCEDURE PERFORMED:   Left common femoral artery access with ultrasound guidance  Right lower extremity arteriogram with third order selection angiographic interpretation  intravascular lithotripsy of the P3 popliteal artery and TP trunk with a 3 x 80 shockwave balloon  PTA of the popliteal artery, TP trunk, and peroneal artery with a 3 to 2.5 x 210 Tammy cross balloon   PTA of the right posterior tibial artery 1.5 x 40 Tammy cross balloon  Closure of the left common femoral artery access site with a 6 Khmer minx     SURGEON: Denzel Zabala MD     6/24/2025     PREOPERATIVE DIAGNOSIS: Vascular occlusion [I99.8]     POSTOPERATIVE DIAGNOSIS: Post-Op Diagnosis Codes:     * Vascular occlusion [I99.8]     PROCEDURE PERFORMED:   Right above knee amputation     SURGEON: Denzel Zabala MD      ANESTHESIA: General.     PREPARATION: Routine.     STAFF: Circulator: Francisco Lopez RN  Scrub Person: Adonis Anaya; Tasha Browning     Estimated Blood Loss: <500ml     SPECIMENS: right leg       Seen today post dialysis.  Awake and interactive.  Eating lunch.  Complains of achy pain at the right thigh.  No other complaints.  No chest pain.  No shortness of breath.  No fevers  "noted.    Received couple of units of packed RBC on June 24 June 2025 for hemoglobin of 7.  Posttransfusion hemoglobin is 8.8  Platelet count 118 (121)      Spoke to the daughter who is at bedside.  \"I think he is overmedicated.\"  She rationalized he was in pain that is why he had to take some medication.  He did not participate quite well with therapy because he was somewhat sleepy according to daughter  Review of Systems   Limited information from patient      Objective    Temp:  [98 °F (36.7 °C)-100.5 °F (38.1 °C)] 98 °F (36.7 °C)  Heart Rate:  [60-76] 60  Resp:  [16-18] 16  BP: (118-190)/(42-74) 118/42  Physical Exam  Snoring  Resting  Not in any distress  Not requiring any oxygen.  O2 saturation in the mid 90s when I came to see him    GEN:  in NAD, appears thin/frail  HEENT:Trachea midline  Lungs: no wheezing/rales/rhonchi  Heart: RRR, +S1/s2, no rub  ABD: soft, nt/nd, +BS, no guarding/rebound  Extremities: (Right AKA, bandage intact and clean/dry.) --- Deferred to vascular.  Patient is completely covered up with thick blanket at the time of visit.  Daughter at bedside.  Psych: normal mood & affect        Results Review:  I have reviewed the labs, radiology results, and diagnostic studies.    Laboratory Data:   Results from last 7 days   Lab Units 06/26/25  0531 06/25/25  1832 06/25/25  0305 06/24/25  0420   WBC 10*3/mm3 10.25  --  6.97 7.88   HEMOGLOBIN g/dL 8.8* 7.0* 7.1* 7.5*   HEMATOCRIT % 26.9* 21.4* 22.9* 23.6*   PLATELETS 10*3/mm3 118*  --  121* 107*        Results from last 7 days   Lab Units 06/26/25  0531 06/25/25  0305 06/24/25  0420 06/23/25  0339   SODIUM mmol/L 132* 135* 137 135*   POTASSIUM mmol/L 4.1 4.4 4.0 3.7   CHLORIDE mmol/L 99 104 105 101   CO2 mmol/L 22.0 19.0* 21.0* 22.0   BUN mg/dL 18.7 28.9* 24.2* 44.4*   CREATININE mg/dL 1.98* 2.89* 2.25* 3.81*   CALCIUM mg/dL 8.6 8.6 8.6 8.6   BILIRUBIN mg/dL 0.6 0.3  --  0.3   ALK PHOS U/L 149* 127*  --  144*   ALT (SGPT) U/L <5 <5  --  <5   AST " "(SGOT) U/L 34 24  --  12   GLUCOSE mg/dL 113* 89 103* 99       Culture Data:   No results found for: \"BLOODCX\", \"URINECX\", \"WOUNDCX\", \"MRSACX\", \"RESPCX\", \"STOOLCX\"    Radiology Data:   Imaging Results (Last 24 Hours)       ** No results found for the last 24 hours. **            I have reviewed the patient's current medications.     Assessment/Plan   Assessment  Active Hospital Problems    Diagnosis     **Vascular occlusion     ESRD (end stage renal disease) on dialysis     Chronic diastolic (congestive) heart failure     CLL (chronic lymphocytic leukemia)     Peripheral arterial disease     Essential hypertension        Medical Decision Making  Number and Complexity of problems/Treatment Plan  #1 vascular occlusion - with intervention on 6/22 right popliteal artery and peroneal artery.  Also had PTA to the right posterior tibial artery.  Was on heparin drip preprocedure. Post procedure on aspirin, Plavix, statin.  Fortunately despite attempts at revascularization patient continues to have PAD with poor blood flow to his lower extremity.  He developed gangrenous changes of the right third toe.  As such was taken for AKA  June 24.  Continue postoperative care per vascular.    #2 ESRD -   Nephrology following.  Continue as per schedule (next one is on June 27).     #3 chronic diastolic CHF -appears euvolemic.  No signs of exacerbation.    #4 hypertension -Home BP meds resumed.  Monitor.  He is on extensive regimen of nifedipine, Isordil, hydralazine, Coreg, Aldactone.  Hydralazine was put on hold 6/22.  Patient continues to intermittently get some blood pressures held by nursing staff due to low diastolic pressures.  BP swings noted    #5 chronic constipation -continue bowel regimen.  Positive BM    #6 anemia of chronic disease -monitor closely.  Status post couple of units of transfusion on June 24 and 25; on repeat troponin within    #7 history of CLL    Medications as outlined below  aspirin, 81 mg, Oral, " Daily  atorvastatin, 10 mg, Oral, Nightly  carvedilol, 12.5 mg, Oral, BID With Meals  clopidogrel, 75 mg, Oral, Daily  epoetin cecile/cecile-epbx, 10,000 Units, Subcutaneous, Once per day on Monday Wednesday Friday  gabapentin, 300 mg, Oral, Nightly  [Held by provider] hydrALAZINE, 100 mg, Oral, TID  isosorbide dinitrate, 10 mg, Oral, TID - Nitrates  levothyroxine, 100 mcg, Oral, Q AM  montelukast, 10 mg, Oral, Nightly  NIFEdipine XL, 120 mg, Oral, Daily  senna-docusate sodium, 2 tablet, Oral, BID  sodium chloride, 10 mL, Intravenous, Q12H  [Held by provider] spironolactone, 25 mg, Oral, Daily  tamsulosin, 0.4 mg, Oral, Nightly  [Held by provider] valsartan, 320 mg, Oral, Daily      Vascular anticipate will be cleared for discharge sometime Monday once dressing is taken down.    Conditions and Status  Fair.     [facility] Data  External documents reviewed: Reviewed  Cardiac tracing (EKG, telemetry) interpretation: None  Radiology interpretation: Reports reviewed  Labs reviewed: As above  Any tests that were considered but not ordered: None     Decision rules/scores evaluated (example [id number], Wells, etc): None     Discussed with: Patient, nursing, SW     Care Planning  Shared decision making: Patient and consultants    Code status and discussions: full code    Disposition  Social Determinants of Health that impact treatment or disposition: none      Anticipate discharge plans to go back to [facility] sometime Monday depending on recommendation by vascular surgery    Electronically signed by Jamie Pineda MD, 06/26/25, 12:44 CDT.

## 2025-06-26 NOTE — PROGRESS NOTES
LOS: 7 days   Patient Care Team:  Nathan Zimmer MD as PCP - General (Internal Medicine)  Adrien Brewster MD as Consulting Physician (Gastroenterology)  Eleuterio Farley MD (Inactive) as Cardiologist (Cardiology)  Elena Jon APRN as Referring Physician (Vascular Surgery)  Bolivar Rueda MD as Consulting Physician (Nephrology)  Slava Tate APRN as Nurse Practitioner (Family Medicine)  New Omalley MD as Consulting Physician (Pulmonary Disease)  Becky Camacho APRN as Nurse Practitioner (Hematology and Oncology)  Gil Pineda DO as Consulting Physician (Vascular Surgery)  Malu Lozano, DEMETRIUS as Ambulatory  ([facility])    Chief Complaint: RLE rest pain and wounds    Subjective     Patient Complaints: Pain controlled. Doing well and without complaints.     Objective     Vital Signs  Temp:  [98 °F (36.7 °C)-100.5 °F (38.1 °C)] 98.2 °F (36.8 °C)  Heart Rate:  [68-79] 72  Resp:  [16-18] 16  BP: (125-190)/(47-74) 162/50    NAD.   AFVSS.   R AKA dressing CDI.       Laboratory Data:   Results from last 7 days   Lab Units 06/26/25  0531 06/25/25  1832 06/25/25  0305 06/24/25  0420   WBC 10*3/mm3 10.25  --  6.97 7.88   HEMOGLOBIN g/dL 8.8* 7.0* 7.1* 7.5*   HEMATOCRIT % 26.9* 21.4* 22.9* 23.6*   PLATELETS 10*3/mm3 118*  --  121* 107*       Results from last 7 days   Lab Units 06/26/25  0531 06/25/25  0305 06/24/25  0420 06/23/25  0339   SODIUM mmol/L 132* 135* 137 135*   POTASSIUM mmol/L 4.1 4.4 4.0 3.7   CHLORIDE mmol/L 99 104 105 101   CO2 mmol/L 22.0 19.0* 21.0* 22.0   BUN mg/dL 18.7 28.9* 24.2* 44.4*   CREATININE mg/dL 1.98* 2.89* 2.25* 3.81*   CALCIUM mg/dL 8.6 8.6 8.6 8.6   BILIRUBIN mg/dL 0.6 0.3  --  0.3   ALK PHOS U/L 149* 127*  --  144*   ALT (SGPT) U/L <5 <5  --  <5   AST (SGOT) U/L 34 24  --  12   GLUCOSE mg/dL 113* 89 103* 99     Results from last 7 days   Lab Units 06/19/25  1506   PROTIME Seconds 15.7*   INR  1.19*   APTT seconds  33.3            Medication Review: Reviewed    Assessment & Plan       Vascular occlusion    Essential hypertension    Peripheral arterial disease    CLL (chronic lymphocytic leukemia)    Chronic diastolic (congestive) heart failure    ESRD (end stage renal disease) on dialysis          Plan for disposition:  75-year-old male with right lower extremity ischemic rest pain.  S/p R AKA on 6.24.25    -Pain controlled. Cont current regimen.   -Hgb 8.8 from 7.0 after 1uPRBC. Cont to monitor.   -Will plan on taking dressing down on Monday. Anticipate patient will be cleared for DC from a vascular standpoint at that time.   -DCP ongoing.   -Hospitalist admitting appreciate their assistance.          Blayne Zabala MD  Vascular Surgery  [phone]  06/26/25  09:42 CDT

## 2025-06-26 NOTE — PLAN OF CARE
Goal Outcome Evaluation:              Outcome Evaluation: Pt in room with daughter. S/p right above knee amputation on 6/24. Very somnolent during visit. No intake recorded in the last 72 hr, but when dietitian asked daughter, she noted that since surgery, pt has been eating 50% of meals and drinking Boost regularly. Currently on a renal, low potassium, low sodium diet. Per results review, pt's potassium has been in the normal range since admission. Recommended liberalized, regular/house diet at this time. Improved nutrient and energy intake is of greater benefit than sodium, potassium, and phosphorus restriction at this time d/t malnutrition. Regular/house inpatient menu provides 109 g of protein and 2398 kcal if consumed 100%. Pt tolerating HD well. On room air. Last BM 6/23. New labs: Na 132, Creat 1.98, Alb 3.0, Glu 113. Dietitian encouraged pt to eat well and to follow up with [employer] for any food prefrences. Pt to continue receiving ONS due to intake <50% to help meet needs. See MAR for medications. Will continue to monitor.

## 2025-06-26 NOTE — PLAN OF CARE
Goal Outcome Evaluation:  Plan of Care Reviewed With: patient        Progress: no change  Outcome Evaluation: iid patent. cont c/o pain and frequently requesting pain med even after medicating per order. alternating po and iv pain med per order. sleeps at frequent intervals. right stump drsg intact. no acute distress noted. cont to monitor.

## 2025-06-26 NOTE — PROGRESS NOTES
Nephrology ([facility]) Progress Note      Patient:  Ricardo Hugo  YOB: 1950  Date of Service: 6/26/2025  MRN: [id number]   Acct: [id number]   Primary Care Physician: Nathan Zimmer MD  Advance Directive:   Code Status and Medical Interventions: CPR (Attempt to Resuscitate); Full Support   Ordered at: 06/19/25 1944     Code Status (Patient has no pulse and is not breathing):    CPR (Attempt to Resuscitate)     Medical Interventions (Patient has pulse or is breathing):    Full Support     Admit Date: 6/19/2025       Hospital Day: 7  Referring Provider: No Known Provider      Patient personally seen and examined.  Complete chart including Consults, Notes, Operative Reports, Labs, Cardiology, and Radiology studies reviewed as able.        Subjective:  Ricardo Hugo is a 75 y.o. male for whom we were consulted for evaluation and treatment of end-stage renal disease on maintenance dialysis. Patient has permacatheter as a dialysis access. He also has history of peripheral vascular disease, right carotid endarterectomy, PTA of the right external iliac artery, right SFA. Presenting to the emergency room complaining of right leg pain. His pain has progressively getting worse and has decided come to the emergency room. Patient is currently being evaluated by Dr. Zabala for another arteriogram, look at the status of his blood flow to the right leg. On June 28 patient underwent intravascular lithotripsy of popliteal artery, posterior tibial artery followed by balloon angioplasties. Tolerating HD treatments well. Underwent right AKA on 6/24. Tolerating HD well during the admission.    Today is awake and alert. No new complaints. Pain adequately controlled with PRN meds. No new overnight issues.       Allergies:  Ondansetron, Zofran [ondansetron hcl], Lortab [hydrocodone-acetaminophen], and Allopurinol    Home Meds:  Medications Prior to Admission   Medication Sig Dispense Refill  Last Dose/Taking    ALPRAZolam (XANAX) 0.25 MG tablet Take 1 tablet by mouth Every Night.   Taking    aspirin (aspirin) 81 MG EC tablet Take 1 tablet by mouth Daily.   Taking    atorvastatin (LIPITOR) 10 MG tablet Take 1 tablet by mouth Daily. 90 tablet 3 Taking    Budeson-Glycopyrrol-Formoterol (Breztri Aerosphere) 160-9-4.8 MCG/ACT aerosol inhaler Inhale 2 puffs 2 (Two) Times a Day.   Taking    calcitriol (ROCALTROL) 0.5 MCG capsule Take 1 capsule by mouth Daily. 90 capsule 2 Taking    carvedilol (COREG) 12.5 MG tablet Take 1 tablet by mouth 2 (Two) Times a Day With Meals.   Taking    clopidogrel (PLAVIX) 75 MG tablet Take 1 tablet by mouth Daily.   Taking    docusate sodium (COLACE) 100 MG capsule Take 1 capsule by mouth 3 (Three) Times a Day As Needed for Constipation.   Taking As Needed    empagliflozin (Jardiance) 10 MG tablet tablet Take 1 tablet by mouth Daily.   Taking    esomeprazole (nexIUM) 20 MG capsule Take 1 capsule by mouth Every Morning Before Breakfast.   Taking    gabapentin (NEURONTIN) 300 MG capsule Take 1 capsule by mouth 2 (Two) Times a Day.   Taking    hydrALAZINE (APRESOLINE) 100 MG tablet Take 1 tablet by mouth 3 (Three) Times a Day.   Taking    iron polysaccharides (NIFEREX) 150 MG capsule Take 1 capsule by mouth Daily.   Taking    isosorbide dinitrate (ISORDIL) 10 MG tablet Take 1 tablet by mouth 3 (Three) Times a Day. 270 tablet 2 Taking    levothyroxine (Synthroid) 100 MCG tablet Take 1 tablet by mouth Every Morning. 90 tablet 2 Taking    magnesium chloride ER 64 MG DR tablet Take 1 tablet by mouth Daily.   Taking    montelukast (SINGULAIR) 10 MG tablet Take 1 tablet by mouth Every Night.   Taking    Multiple Vitamins-Minerals (PRESERVISION/LUTEIN) capsule Take 1 capsule by mouth 2 (two) times a day.   Taking    NIFEdipine XL (ADALAT CC) 60 MG 24 hr tablet Take 2 tablets by mouth Daily.   Taking    spironolactone (ALDACTONE) 25 MG tablet Take 1 tablet by mouth Daily.   Taking     tamsulosin (FLOMAX) 0.4 MG capsule 24 hr capsule Take 1 capsule by mouth Every Night.   Taking    valsartan (DIOVAN) 320 MG tablet Take 1 tablet by mouth Daily.   Taking    vitamin D (ERGOCALCIFEROL) 1.25 MG (68399 UT) capsule capsule Take 1 capsule by mouth 1 (One) Time Per Week. Mondays   Taking    albuterol sulfate  (90 Base) MCG/ACT inhaler Inhale 2 puffs Every 6 (Six) Hours As Needed for Wheezing or Shortness of Air.       bisacodyl (DULCOLAX) 10 MG suppository Insert 1 suppository into the rectum Daily As Needed for Constipation.       desoximetasone (TOPICORT) 0.25 % cream Apply 1 Application topically to the appropriate area as directed 2 (Two) Times a Day As Needed for Irritation. irritation 60 g 0     fluticasone (FLONASE) 50 MCG/ACT nasal spray Administer 2 sprays into the nostril(s) as directed by provider Daily As Needed for Allergies.       naloxone (NARCAN) 4 MG/0.1ML nasal spray Call 911. Don't prime. [place] in 1 nostril for overdose. Repeat in 2-3 minutes in other nostril if no or minimal breathing/responsiveness. 2 each 0     polyethylene glycol (MIRALAX) 17 g packet Take 17 g by mouth Daily As Needed (constipation).       senna 8.6 MG tablet Take 2 tablets by mouth Daily As Needed for Constipation.       [DISCONTINUED] cloNIDine (CATAPRES) 0.3 MG tablet Take 1 tablet by mouth 3 times a day. (Patient not taking: Reported on 6/21/2025)   Not Taking    [DISCONTINUED] gabapentin (NEURONTIN) 300 MG capsule Take 1 capsule by mouth Every Night. 24 capsule 0     [DISCONTINUED] omeprazole (priLOSEC) 20 MG capsule Take 1 capsule by mouth Daily. (Patient not taking: Reported on 6/21/2025)   Not Taking    [DISCONTINUED] oxyCODONE-acetaminophen (PERCOCET) 5-325 MG per tablet Take 1 tablet by mouth Every 6 (Six) Hours As Needed for Moderate Pain. 24 tablet 0        Medicines:  Current Facility-Administered Medications   Medication Dose Route Frequency Provider Last Rate Last Admin    acetaminophen  (TYLENOL) tablet 650 mg  650 mg Oral Q4H PRN Blayne Zabala MD   650 mg at 06/23/25 1842    Or    acetaminophen (TYLENOL) 160 MG/5ML oral solution 650 mg  650 mg Oral Q4H PRN Blayne Zabala MD        Or    acetaminophen (TYLENOL) suppository 650 mg  650 mg Rectal Q4H PRN Blayne Zabala MD        aspirin EC tablet 81 mg  81 mg Oral Daily Blayne Zabala MD   81 mg at 06/26/25 0851    atorvastatin (LIPITOR) tablet 10 mg  10 mg Oral Nightly Blayne Zabala MD   10 mg at 06/25/25 2134    sennosides-docusate (PERICOLACE) 8.6-50 MG per tablet 2 tablet  2 tablet Oral BID Blayne Zabala MD   2 tablet at 06/26/25 0851    And    polyethylene glycol (MIRALAX) packet 17 g  17 g Oral Daily PRN Blayne Zabala MD        And    bisacodyl (DULCOLAX) EC tablet 5 mg  5 mg Oral Daily PRN Blayne Zabala MD        And    bisacodyl (DULCOLAX) suppository 10 mg  10 mg Rectal Daily PRN Blayne Zabala MD        carvedilol (COREG) tablet 12.5 mg  12.5 mg Oral BID With Meals Blayne Zabala MD   12.5 mg at 06/26/25 0851    clopidogrel (PLAVIX) tablet 75 mg  75 mg Oral Daily Blayne Zabala MD   75 mg at 06/26/25 0851    epoetin cecile-epbx (RETACRIT) injection 10,000 Units  10,000 Units Subcutaneous Once per day on Monday Wednesday Friday Blayne Zabala MD   10,000 Units at 06/25/25 1244    gabapentin (NEURONTIN) capsule 300 mg  300 mg Oral Nightly Blayne Zabala MD   300 mg at 06/25/25 2134    heparin (porcine) injection 3,200 Units  3,200 Units Intracatheter PRN Blayne Zabala MD   3,200 Units at 06/25/25 1058    [Held by provider] hydrALAZINE (APRESOLINE) tablet 100 mg  100 mg Oral TID Blayne Zabala MD   100 mg at 06/21/25 1941    isosorbide dinitrate (ISORDIL) tablet 10 mg  10 mg Oral TID - Nitrates Blayne Zabala MD   10 mg at 06/26/25 0851    levothyroxine (SYNTHROID, LEVOTHROID) tablet 100 mcg  100 mcg Oral Q AM Blayne Zabala MD   100 mcg at 06/26/25 0520    montelukast (SINGULAIR) tablet 10 mg  10 mg Oral  Nightly Blayne Zabala MD   10 mg at 06/25/25 2134    Morphine sulfate (PF) injection 1 mg  1 mg Intravenous Q6H PRN MaeganDc doss DEBBY, DO   1 mg at 06/26/25 0259    naloxone (NARCAN) injection 0.4 mg  0.4 mg Intravenous Q5 Min PRN Blayne Zabala MD        NIFEdipine XL (PROCARDIA XL) 24 hr tablet 120 mg  120 mg Oral Daily Blayne Zabala MD   120 mg at 06/25/25 1857    nitroglycerin (NITROSTAT) SL tablet 0.4 mg  0.4 mg Sublingual Q5 Min PRN Blayne Zabala MD        oxyCODONE-acetaminophen (PERCOCET) 5-325 MG per tablet 1 tablet  1 tablet Oral Q4H PRN Blayne Zabala MD   1 tablet at 06/26/25 0620    promethazine (PHENERGAN) tablet 12.5 mg  12.5 mg Oral Q6H PRN Blayne Zabala MD   12.5 mg at 06/20/25 2243    sodium chloride 0.9 % flush 10 mL  10 mL Intravenous Q12H Blayne Zabala MD   10 mL at 06/26/25 0852    sodium chloride 0.9 % flush 10 mL  10 mL Intravenous PRN Blayne Zabala MD        sodium chloride 0.9 % infusion 40 mL  40 mL Intravenous PRN Blayne Zabala MD        [Held by provider] spironolactone (ALDACTONE) tablet 25 mg  25 mg Oral Daily Blayne Zabala MD   25 mg at 06/22/25 0905    tamsulosin (FLOMAX) 24 hr capsule 0.4 mg  0.4 mg Oral Nightly Blayne Zabala MD   0.4 mg at 06/25/25 2134    [Held by provider] valsartan (DIOVAN) tablet 320 mg  320 mg Oral Daily Blayne Zabala MD   320 mg at 06/22/25 0905       Past Medical History:  Past Medical History:   Diagnosis Date    3-vessel CAD 08/11/2020    Allergic rhinitis     Anemia     Anxiety disorder 04/27/2020    Arthritis     Asymmetrical sensorineural hearing loss 06/28/2017    Atherosclerosis of native artery of both lower extremities with intermittent claudication 07/18/2019    Avascular necrosis of femoral head, left 07/11/2020    right hip after surgery    Carotid stenosis     Chronic mucoid otitis media     Chronic rhinitis     COPD (chronic obstructive pulmonary disease)     Coronary artery disease     HEART BYPASS 2004     Crohn's disease of large intestine with other complication 07/30/2020    Chronic diarrhea Colonoscopy July 2020 revealed mild patchy scattered hemosiderin staining with inflammation more so in rectosigmoid area.  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    Iron deficiency anemia 07/14/2020    Mixed hearing loss of left ear     PAD (peripheral artery disease) 08/11/2020    Perianal abscess     Pernicious anemia 08/17/2020    took shots but never diagnosed with b12 deficiency    Personal history of alcoholism 08/11/2020    quit drinking in 2013    Prostatic hypertrophy 08/11/2020    Sensorineural hearing loss     Sepsis with acute renal failure 09/15/2020    Shortness of breath 05/27/2021    Tinnitus     Ventricular tachycardia, nonsustained 07/14/2020    Weight loss 07/11/2020       Past Surgical History:  Past Surgical History:   Procedure Laterality Date    ABOVE KNEE AMPUTATION Right 6/24/2025    Procedure: right above knee amputation;  Surgeon: Blayne Zabala MD;  Location:  [facility] OR;  Service: Vascular;  Laterality: Right;    AORTOGRAM Right 4/25/2025    Procedure: RIGHT LOWER EXTREMITY ANGIOGRAM, SHOCKWAVE LITHOTRIPSY, BALLOON ANGIOPLASTY, MYNX CLOSURE;  Surgeon: Gil Pineda DO;  Location:  [facility] HYBRID OR;  Service: Vascular;  Laterality: Right;    AORTOGRAM Left 5/22/2025    Procedure: LEFT LOWER EXTREMITY ANGIOGRAM, SHOCKWAVE LITHOTRIPSY, BALLOON ANGIOPLASTY, STENT PLACEMENT, MYNX CLOSURE;  Surgeon: Blayne Zabala MD;  Location:  [facility]BRID OR;  Service: Vascular;  Laterality: Left;    AORTOGRAM Right 5/30/2025    Procedure: AORTOILIAC  ANGIOGRAM WITH LEFT LOWER EXTREMITY ANGIOGRAM;  Surgeon: Gil Pineda DO;  Location: [facility] HYBRID OR;  Service: Vascular;  Laterality: Right;    AORTOGRAM Right 6/20/2025    Procedure: right lower extremity arteriogram, shockwave lithotripsy, balloon angioplasty, mynx closue;  Surgeon: Blayne Zabala MD;  Location: [facility] HYBRID OR;  Service: Vascular;  Laterality: Right;    ARTERY SURGERY  2021    right side on neck    CAROTID ENDARTERECTOMY Right 05/10/2021    Procedure: RIGHT CAROTID ENDARTERECTOMY WITH EEG;  Surgeon: Gil Pineda DO;  Location: [facility] HYBRID OR 12;  Service: Vascular;  Laterality: Right;    COLONOSCOPY N/A 07/02/2020    Procedure: COLONOSCOPY WITH ANESTHESIA;  Surgeon: Adrien Brewster MD;  Location: [facility] ENDOSCOPY;  Service: Gastroenterology;  Laterality: N/A;  pre op: diarrhea  post op: polyps  PCP: Joe Velasco MD    COLONOSCOPY N/A 10/13/2020    Procedure: COLONOSCOPY WITH ANESTHESIA;  Surgeon: Adrien Brewster MD;  Location: [facility] ENDOSCOPY;  Service: Gastroenterology;  Laterality: N/A;  Pre: Chronic Diarrhea, Crohn's  Post: AVM  Dr. Neftali Velasco  CO2 Inflation Used    COLONOSCOPY N/A 12/08/2023    Procedure: COLONOSCOPY WITH ANESTHESIA;  Surgeon: Adrien Brewster MD;  Location: [facility] ENDOSCOPY;  Service: Gastroenterology;  Laterality: N/A;  pre chrone's disease  post sub optimal prep, polyp, chrone's      CORONARY ARTERY BYPASS GRAFT  2003    x3    ENDOSCOPY N/A 11/02/2021    Procedure: ESOPHAGOGASTRODUODENOSCOPY WITH ANESTHESIA;  Surgeon: Bridger Bell MD;  Location: [facility] ENDOSCOPY;  Service: Gastroenterology;  Laterality: N/A;  pre anemia;gi bleed  post  gi bleed;schatski ring  Dr. ERIC Velasco    ENDOSCOPY N/A 10/10/2023    Procedure: ESOPHAGOGASTRODUODENOSCOPY WITH ANESTHESIA;  Surgeon: Adrien Brewster MD;  Location: [facility] ENDOSCOPY;  Service: Gastroenterology;  Laterality: N/A;  preop; anemia  postop esophagitis ; R/O barretts   PCP Randall  Beata    EYE SURGERY Bilateral     catorac    INCISION AND DRAINAGE PERIRECTAL ABSCESS N/A 2017    Procedure: INCISION AND DRAINAGE OF JEET ANAL ABSCESS;  Surgeon: Lynette Smith MD;  Location:  [facility] OR;  Service:     INGUINAL HERNIA REPAIR Bilateral 2023    Procedure: INGUINAL HERNIA BILATERAL REPAIR LAPAROSCOPIC WITH DAVINCI ROBOT WITH MESH;  Surgeon: Tahira Rivera MD;  Location:  [facility] OR;  Service: Robotics - DaVinci;  Laterality: Bilateral;    INSERTION HEMODIALYSIS CATHETER N/A 2025    Procedure: HEMODIALYSIS CATHETER PLACEMENT;  Surgeon: Gil Pineda DO;  Location:  [facility] HYBRID OR;  Service: Vascular;  Laterality: N/A;    MYRINGOTOMY W/ TUBES Left 2017    06/10/2016    TONSILLECTOMY      TOTAL HIP ARTHROPLASTY Right        Family History  Family History   Problem Relation Age of Onset    Breast cancer Mother     Dementia Father     Glaucoma Father     No Known Problems Daughter     Colon polyps Neg Hx     Colon cancer Neg Hx        Social History  Social History     Socioeconomic History    Marital status:    Tobacco Use    Smoking status: Former     Current packs/day: 0.00     Average packs/day: 0.5 packs/day for 25.8 years (12.9 ttl pk-yrs)     Types: Cigarettes     Start date:      Quit date: 10/13/2013     Years since quittin.7     Passive exposure: Past    Smokeless tobacco: Never    Tobacco comments:     quit    Vaping Use    Vaping status: Former    Substances: Nicotine    Devices: Pre-filled or refillable cartridge   Substance and Sexual Activity    Alcohol use: Not Currently    Drug use: No    Sexual activity: Not Currently     Partners: Female       Review of Systems:  History obtained from chart review and the patient  General ROS: No fever or chills  Respiratory ROS: No cough, shortness of breath, wheezing  Cardiovascular ROS: No chest pain or palpitations  Gastrointestinal ROS: No abdominal pain or melena  Genito-Urinary ROS: No  dysuria or hematuria  Psych ROS: No anxiety and depression  14 point ROS reviewed with the patient and negative except as noted above and in the HPI unless unable to obtain.    Objective:  Patient Vitals for the past 24 hrs:   BP Temp Temp src Pulse Resp SpO2 Weight   06/26/25 0700 162/50 98.2 °F (36.8 °C) Oral 72 16 97 % --   06/26/25 0620 162/50 -- -- 71 -- -- --   06/26/25 0608 -- -- -- -- -- -- 39.7 kg (87 lb 9.6 oz)   06/26/25 0456 (!) 183/54 98.4 °F (36.9 °C) Oral 76 16 99 % --   06/26/25 0308 (!) 190/54 -- -- 74 -- -- --   06/26/25 0140 (!) 182/54 98.6 °F (37 °C) Oral 68 16 99 % --   06/26/25 0125 177/59 98.6 °F (37 °C) -- 68 -- 98 % --   06/26/25 0035 171/50 99.3 °F (37.4 °C) Oral 68 16 98 % --   06/25/25 1945 125/55 100.5 °F (38.1 °C) Oral 73 16 98 % --   06/25/25 1552 151/74 98.6 °F (37 °C) Oral 74 18 98 % --   06/25/25 1235 149/47 98 °F (36.7 °C) Oral 79 18 95 % --       Intake/Output Summary (Last 24 hours) at 6/26/2025 0956  Last data filed at 6/26/2025 0930  Gross per 24 hour   Intake 420 ml   Output 2025 ml   Net -1605 ml     General: awake/alert   Chest:  clear to auscultation bilaterally without respiratory distress  CVS: regular rate and rhythm  Abdominal: soft, nontender, positive bowel sounds  Extremities: right AKA  Skin: warm and dry without rash      Labs:  Results from last 7 days   Lab Units 06/26/25  0531 06/25/25  1832 06/25/25  0305 06/24/25  0420   WBC 10*3/mm3 10.25  --  6.97 7.88   HEMOGLOBIN g/dL 8.8* 7.0* 7.1* 7.5*   HEMATOCRIT % 26.9* 21.4* 22.9* 23.6*   PLATELETS 10*3/mm3 118*  --  121* 107*         Results from last 7 days   Lab Units 06/26/25  0531 06/25/25  0305 06/24/25  0420 06/23/25  0339   SODIUM mmol/L 132* 135* 137 135*   POTASSIUM mmol/L 4.1 4.4 4.0 3.7   CHLORIDE mmol/L 99 104 105 101   CO2 mmol/L 22.0 19.0* 21.0* 22.0   BUN mg/dL 18.7 28.9* 24.2* 44.4*   CREATININE mg/dL 1.98* 2.89* 2.25* 3.81*   CALCIUM mg/dL 8.6 8.6 8.6 8.6   EGFR mL/min/1.73 34.2* 21.7* 29.3* 15.6*  "  BILIRUBIN mg/dL 0.6 0.3  --  0.3   ALK PHOS U/L 149* 127*  --  144*   ALT (SGPT) U/L <5 <5  --  <5   AST (SGOT) U/L 34 24  --  12   GLUCOSE mg/dL 113* 89 103* 99       Radiology:   Imaging Results (Last 72 Hours)       Procedure Component Value Units Date/Time    IR Angiogram Extremity [[id number]] Resulted: 06/23/25 1608     Updated: 06/23/25 1608            Culture:  No results found for: \"BLOODCX\", \"URINECX\", \"WOUNDCX\", \"MRSACX\", \"RESPCX\", \"STOOLCX\"      Assessment    End stage renal disease on hemodialysis  Hypertension  Anemia of CKD  CLL  Severe peripheral vascular disease--s/p multiple vascular procedures  S/p right AKA on 6/24  Chronic diastolic CHF  Hyponatremia     Plan:   Dialysis next due 6/27  Monitor labs      Slava Tate, STERLING  6/26/2025  09:56 CDT    "

## 2025-06-26 NOTE — PLAN OF CARE
Goal Outcome Evaluation:              Outcome Evaluation: pt intermittently confused, more so this afternoon; f/c DC; dialysis tomorrow; turning as pt allows; safety maintained

## 2025-06-27 ENCOUNTER — TELEPHONE (OUTPATIENT)
Dept: VASCULAR SURGERY | Facility: CLINIC | Age: 75
End: 2025-06-27
Payer: MEDICARE

## 2025-06-27 LAB
ABO GROUP BLD: NORMAL
ANISOCYTOSIS BLD QL: ABNORMAL
BASOPHILS # BLD MANUAL: 0 10*3/MM3 (ref 0–0.2)
BASOPHILS NFR BLD MANUAL: 0 % (ref 0–1.5)
BH BB BLOOD EXPIRATION DATE: NORMAL
BH BB BLOOD TYPE BARCODE: 5100
BH BB DISPENSE STATUS: NORMAL
BH BB PRODUCT CODE: NORMAL
BH BB UNIT NUMBER: NORMAL
BLD GP AB SCN SERPL QL: NEGATIVE
CROSSMATCH INTERPRETATION: NORMAL
DEPRECATED RDW RBC AUTO: 65 FL (ref 37–54)
EOSINOPHIL # BLD MANUAL: 0.08 10*3/MM3 (ref 0–0.4)
EOSINOPHIL NFR BLD MANUAL: 1 % (ref 0.3–6.2)
ERYTHROCYTE [DISTWIDTH] IN BLOOD BY AUTOMATED COUNT: 18.3 % (ref 12.3–15.4)
HCT VFR BLD AUTO: 19.6 % (ref 37.5–51)
HGB BLD-MCNC: 6.4 G/DL (ref 13–17.7)
HYPOCHROMIA BLD QL: ABNORMAL
LYMPHOCYTES # BLD MANUAL: 1.26 10*3/MM3 (ref 0.7–3.1)
LYMPHOCYTES NFR BLD MANUAL: 4 % (ref 5–12)
MCH RBC QN AUTO: 32.8 PG (ref 26.6–33)
MCHC RBC AUTO-ENTMCNC: 32.7 G/DL (ref 31.5–35.7)
MCV RBC AUTO: 100.5 FL (ref 79–97)
METAMYELOCYTES NFR BLD MANUAL: 2 % (ref 0–0)
MONOCYTES # BLD: 0.31 10*3/MM3 (ref 0.1–0.9)
NEUTROPHILS # BLD AUTO: 5.99 10*3/MM3 (ref 1.7–7)
NEUTROPHILS NFR BLD MANUAL: 75.8 % (ref 42.7–76)
NEUTS BAND NFR BLD MANUAL: 1 % (ref 0–5)
PLATELET # BLD AUTO: 125 10*3/MM3 (ref 140–450)
PMV BLD AUTO: 10 FL (ref 6–12)
POIKILOCYTOSIS BLD QL SMEAR: ABNORMAL
POLYCHROMASIA BLD QL SMEAR: ABNORMAL
RBC # BLD AUTO: 1.95 10*6/MM3 (ref 4.14–5.8)
RH BLD: NEGATIVE
SMALL PLATELETS BLD QL SMEAR: ABNORMAL
T&S EXPIRATION DATE: NORMAL
UNIT  ABO: NORMAL
UNIT  RH: NORMAL
VARIANT LYMPHS NFR BLD MANUAL: 1 % (ref 0–5)
VARIANT LYMPHS NFR BLD MANUAL: 15.2 % (ref 19.6–45.3)
WBC MORPH BLD: NORMAL
WBC NRBC COR # BLD AUTO: 7.8 10*3/MM3 (ref 3.4–10.8)

## 2025-06-27 PROCEDURE — 85007 BL SMEAR W/DIFF WBC COUNT: CPT | Performed by: STUDENT IN AN ORGANIZED HEALTH CARE EDUCATION/TRAINING PROGRAM

## 2025-06-27 PROCEDURE — 86900 BLOOD TYPING SEROLOGIC ABO: CPT | Performed by: STUDENT IN AN ORGANIZED HEALTH CARE EDUCATION/TRAINING PROGRAM

## 2025-06-27 PROCEDURE — 86850 RBC ANTIBODY SCREEN: CPT | Performed by: STUDENT IN AN ORGANIZED HEALTH CARE EDUCATION/TRAINING PROGRAM

## 2025-06-27 PROCEDURE — 25010000002 MORPHINE PER 10 MG

## 2025-06-27 PROCEDURE — 86923 COMPATIBILITY TEST ELECTRIC: CPT

## 2025-06-27 PROCEDURE — 86900 BLOOD TYPING SEROLOGIC ABO: CPT

## 2025-06-27 PROCEDURE — 25010000002 DESMOPRESSIN PER 1 MCG: Performed by: INTERNAL MEDICINE

## 2025-06-27 PROCEDURE — P9016 RBC LEUKOCYTES REDUCED: HCPCS

## 2025-06-27 PROCEDURE — 85025 COMPLETE CBC W/AUTO DIFF WBC: CPT | Performed by: STUDENT IN AN ORGANIZED HEALTH CARE EDUCATION/TRAINING PROGRAM

## 2025-06-27 PROCEDURE — 86901 BLOOD TYPING SEROLOGIC RH(D): CPT | Performed by: STUDENT IN AN ORGANIZED HEALTH CARE EDUCATION/TRAINING PROGRAM

## 2025-06-27 PROCEDURE — 25010000002 HEPARIN (PORCINE) PER 1000 UNITS: Performed by: STUDENT IN AN ORGANIZED HEALTH CARE EDUCATION/TRAINING PROGRAM

## 2025-06-27 PROCEDURE — 25010000002 EPOETIN ALFA-EPBX 10000 UNIT/ML SOLUTION: Performed by: STUDENT IN AN ORGANIZED HEALTH CARE EDUCATION/TRAINING PROGRAM

## 2025-06-27 RX ORDER — MORPHINE SULFATE 2 MG/ML
1 INJECTION, SOLUTION INTRAMUSCULAR; INTRAVENOUS ONCE AS NEEDED
Status: COMPLETED | OUTPATIENT
Start: 2025-06-27 | End: 2025-06-27

## 2025-06-27 RX ORDER — OXYCODONE AND ACETAMINOPHEN 10; 325 MG/1; MG/1
1 TABLET ORAL EVERY 6 HOURS PRN
Refills: 0 | Status: DISPENSED | OUTPATIENT
Start: 2025-06-27 | End: 2025-07-02

## 2025-06-27 RX ADMIN — GABAPENTIN 300 MG: 300 CAPSULE ORAL at 20:24

## 2025-06-27 RX ADMIN — DOCUSATE SODIUM 50 MG AND SENNOSIDES 8.6 MG 2 TABLET: 8.6; 5 TABLET, FILM COATED ORAL at 20:24

## 2025-06-27 RX ADMIN — OXYCODONE AND ACETAMINOPHEN 1 TABLET: 325; 10 TABLET ORAL at 23:01

## 2025-06-27 RX ADMIN — Medication 10 ML: at 20:24

## 2025-06-27 RX ADMIN — DOCUSATE SODIUM 50 MG AND SENNOSIDES 8.6 MG 2 TABLET: 8.6; 5 TABLET, FILM COATED ORAL at 12:02

## 2025-06-27 RX ADMIN — MONTELUKAST SODIUM 10 MG: 10 TABLET, COATED ORAL at 20:24

## 2025-06-27 RX ADMIN — OXYCODONE AND ACETAMINOPHEN 1 TABLET: 5; 325 TABLET ORAL at 12:02

## 2025-06-27 RX ADMIN — ISOSORBIDE DINITRATE 10 MG: 10 TABLET ORAL at 20:34

## 2025-06-27 RX ADMIN — ISOSORBIDE DINITRATE 10 MG: 10 TABLET ORAL at 16:04

## 2025-06-27 RX ADMIN — OXYCODONE AND ACETAMINOPHEN 1 TABLET: 5; 325 TABLET ORAL at 07:06

## 2025-06-27 RX ADMIN — LEVOTHYROXINE SODIUM 100 MCG: 0.1 TABLET ORAL at 07:02

## 2025-06-27 RX ADMIN — NIFEDIPINE 120 MG: 60 TABLET, FILM COATED, EXTENDED RELEASE ORAL at 17:45

## 2025-06-27 RX ADMIN — TAMSULOSIN HYDROCHLORIDE 0.4 MG: 0.4 CAPSULE ORAL at 20:24

## 2025-06-27 RX ADMIN — CARVEDILOL 12.5 MG: 6.25 TABLET, FILM COATED ORAL at 20:34

## 2025-06-27 RX ADMIN — DESMOPRESSIN ACETATE 20 MCG: 4 SOLUTION INTRAVENOUS at 17:46

## 2025-06-27 RX ADMIN — HEPARIN SODIUM 3200 UNITS: 1000 INJECTION INTRAVENOUS; SUBCUTANEOUS at 11:02

## 2025-06-27 RX ADMIN — CLOPIDOGREL BISULFATE 75 MG: 75 TABLET, FILM COATED ORAL at 12:02

## 2025-06-27 RX ADMIN — ASPIRIN 81 MG: 81 TABLET, COATED ORAL at 12:02

## 2025-06-27 RX ADMIN — MORPHINE SULFATE 1 MG: 2 INJECTION, SOLUTION INTRAMUSCULAR; INTRAVENOUS at 14:58

## 2025-06-27 RX ADMIN — ATORVASTATIN CALCIUM 10 MG: 10 TABLET, FILM COATED ORAL at 20:24

## 2025-06-27 RX ADMIN — OXYCODONE AND ACETAMINOPHEN 1 TABLET: 325; 10 TABLET ORAL at 16:04

## 2025-06-27 RX ADMIN — EPOETIN ALFA-EPBX 10000 UNITS: 10000 INJECTION, SOLUTION INTRAVENOUS; SUBCUTANEOUS at 12:02

## 2025-06-27 RX ADMIN — Medication 10 ML: at 12:03

## 2025-06-27 RX ADMIN — OXYCODONE AND ACETAMINOPHEN 1 TABLET: 5; 325 TABLET ORAL at 00:38

## 2025-06-27 NOTE — PROGRESS NOTES
Nephrology ([facility]) Progress Note      Patient:  Ricardo Hugo  YOB: 1950  Date of Service: 6/27/2025  MRN: [id number]   Acct: [id number]   Primary Care Physician: Nathan Zimmer MD  Advance Directive:   Code Status and Medical Interventions: CPR (Attempt to Resuscitate); Full Support   Ordered at: 06/19/25 1944     Code Status (Patient has no pulse and is not breathing):    CPR (Attempt to Resuscitate)     Medical Interventions (Patient has pulse or is breathing):    Full Support     Admit Date: 6/19/2025       Hospital Day: 8  Referring Provider: No Known Provider      Patient personally seen and examined.  Complete chart including Consults, Notes, Operative Reports, Labs, Cardiology, and Radiology studies reviewed as able.        Subjective:  Ricardo Hugo is a 75 y.o. male for whom we were consulted for evaluation and treatment of end-stage renal disease on maintenance dialysis. Patient has permacatheter as a dialysis access. He also has history of peripheral vascular disease, right carotid endarterectomy, PTA of the right external iliac artery, right SFA. Presenting to the emergency room complaining of right leg pain. His pain has progressively getting worse and has decided come to the emergency room. Patient is currently being evaluated by Dr. Zabala for another arteriogram, look at the status of his blood flow to the right leg. On June 28 patient underwent intravascular lithotripsy of popliteal artery, posterior tibial artery followed by balloon angioplasties. Tolerating HD treatments well. Underwent right AKA on 6/24. Tolerating HD well during the admission.    Today is awake and alert. No new complaints. No new overnight issues. Seen during HD and tolerating well  Dialysis   Patient was seen and evaluated on renal replacement therapy and I have personally evaluated the patient and directed the therapy  Modality: Hemodialysis  Access: Catheter  Location:  right upper  QB: 400  QD: 600  UF: up to 2000 as tolerated      Allergies:  Ondansetron, Zofran [ondansetron hcl], Lortab [hydrocodone-acetaminophen], and Allopurinol    Home Meds:  Medications Prior to Admission   Medication Sig Dispense Refill Last Dose/Taking    ALPRAZolam (XANAX) 0.25 MG tablet Take 1 tablet by mouth Every Night.   Taking    aspirin (aspirin) 81 MG EC tablet Take 1 tablet by mouth Daily.   Taking    atorvastatin (LIPITOR) 10 MG tablet Take 1 tablet by mouth Daily. 90 tablet 3 Taking    Budeson-Glycopyrrol-Formoterol (Breztri Aerosphere) 160-9-4.8 MCG/ACT aerosol inhaler Inhale 2 puffs 2 (Two) Times a Day.   Taking    calcitriol (ROCALTROL) 0.5 MCG capsule Take 1 capsule by mouth Daily. 90 capsule 2 Taking    carvedilol (COREG) 12.5 MG tablet Take 1 tablet by mouth 2 (Two) Times a Day With Meals.   Taking    clopidogrel (PLAVIX) 75 MG tablet Take 1 tablet by mouth Daily.   Taking    docusate sodium (COLACE) 100 MG capsule Take 1 capsule by mouth 3 (Three) Times a Day As Needed for Constipation.   Taking As Needed    empagliflozin (Jardiance) 10 MG tablet tablet Take 1 tablet by mouth Daily.   Taking    esomeprazole (nexIUM) 20 MG capsule Take 1 capsule by mouth Every Morning Before Breakfast.   Taking    gabapentin (NEURONTIN) 300 MG capsule Take 1 capsule by mouth 2 (Two) Times a Day.   Taking    hydrALAZINE (APRESOLINE) 100 MG tablet Take 1 tablet by mouth 3 (Three) Times a Day.   Taking    iron polysaccharides (NIFEREX) 150 MG capsule Take 1 capsule by mouth Daily.   Taking    isosorbide dinitrate (ISORDIL) 10 MG tablet Take 1 tablet by mouth 3 (Three) Times a Day. 270 tablet 2 Taking    levothyroxine (Synthroid) 100 MCG tablet Take 1 tablet by mouth Every Morning. 90 tablet 2 Taking    magnesium chloride ER 64 MG DR tablet Take 1 tablet by mouth Daily.   Taking    montelukast (SINGULAIR) 10 MG tablet Take 1 tablet by mouth Every Night.   Taking    Multiple Vitamins-Minerals  (PRESERVISION/LUTEIN) capsule Take 1 capsule by mouth 2 (two) times a day.   Taking    NIFEdipine XL (ADALAT CC) 60 MG 24 hr tablet Take 2 tablets by mouth Daily.   Taking    spironolactone (ALDACTONE) 25 MG tablet Take 1 tablet by mouth Daily.   Taking    tamsulosin (FLOMAX) 0.4 MG capsule 24 hr capsule Take 1 capsule by mouth Every Night.   Taking    valsartan (DIOVAN) 320 MG tablet Take 1 tablet by mouth Daily.   Taking    vitamin D (ERGOCALCIFEROL) 1.25 MG (79436 UT) capsule capsule Take 1 capsule by mouth 1 (One) Time Per Week. Mondays   Taking    albuterol sulfate  (90 Base) MCG/ACT inhaler Inhale 2 puffs Every 6 (Six) Hours As Needed for Wheezing or Shortness of Air.       bisacodyl (DULCOLAX) 10 MG suppository Insert 1 suppository into the rectum Daily As Needed for Constipation.       desoximetasone (TOPICORT) 0.25 % cream Apply 1 Application topically to the appropriate area as directed 2 (Two) Times a Day As Needed for Irritation. irritation 60 g 0     fluticasone (FLONASE) 50 MCG/ACT nasal spray Administer 2 sprays into the nostril(s) as directed by provider Daily As Needed for Allergies.       naloxone (NARCAN) 4 MG/0.1ML nasal spray Call 911. Don't prime. Canvas in 1 nostril for overdose. Repeat in 2-3 minutes in other nostril if no or minimal breathing/responsiveness. 2 each 0     polyethylene glycol (MIRALAX) 17 g packet Take 17 g by mouth Daily As Needed (constipation).       senna 8.6 MG tablet Take 2 tablets by mouth Daily As Needed for Constipation.       [DISCONTINUED] cloNIDine (CATAPRES) 0.3 MG tablet Take 1 tablet by mouth 3 times a day. (Patient not taking: Reported on 6/21/2025)   Not Taking    [DISCONTINUED] gabapentin (NEURONTIN) 300 MG capsule Take 1 capsule by mouth Every Night. 24 capsule 0     [DISCONTINUED] omeprazole (priLOSEC) 20 MG capsule Take 1 capsule by mouth Daily. (Patient not taking: Reported on 6/21/2025)   Not Taking    [DISCONTINUED] oxyCODONE-acetaminophen  (PERCOCET) 5-325 MG per tablet Take 1 tablet by mouth Every 6 (Six) Hours As Needed for Moderate Pain. 24 tablet 0        Medicines:  Current Facility-Administered Medications   Medication Dose Route Frequency Provider Last Rate Last Admin    acetaminophen (TYLENOL) tablet 650 mg  650 mg Oral Q4H PRN Blayne Zabala MD   650 mg at 06/23/25 1842    Or    acetaminophen (TYLENOL) 160 MG/5ML oral solution 650 mg  650 mg Oral Q4H PRN Blayne Zabala MD        Or    acetaminophen (TYLENOL) suppository 650 mg  650 mg Rectal Q4H PRN Blayne Zabala MD        aspirin EC tablet 81 mg  81 mg Oral Daily Blayne Zabala MD   81 mg at 06/26/25 0851    atorvastatin (LIPITOR) tablet 10 mg  10 mg Oral Nightly Blayne Zabala MD   10 mg at 06/26/25 2026    sennosides-docusate (PERICOLACE) 8.6-50 MG per tablet 2 tablet  2 tablet Oral BID Blayne Zabala MD   2 tablet at 06/26/25 2025    And    polyethylene glycol (MIRALAX) packet 17 g  17 g Oral Daily PRN Blayne Zabala MD        And    bisacodyl (DULCOLAX) EC tablet 5 mg  5 mg Oral Daily PRN Blayne Zabala MD        And    bisacodyl (DULCOLAX) suppository 10 mg  10 mg Rectal Daily PRN Blayne Zabala MD        carvedilol (COREG) tablet 12.5 mg  12.5 mg Oral BID With Meals Blayne Zabala MD   12.5 mg at 06/26/25 0851    clopidogrel (PLAVIX) tablet 75 mg  75 mg Oral Daily Blayne Zabala MD   75 mg at 06/26/25 0851    epoetin cecile-epbx (RETACRIT) injection 10,000 Units  10,000 Units Subcutaneous Once per day on Monday Wednesday Friday Blayne Zabala MD   10,000 Units at 06/25/25 1244    gabapentin (NEURONTIN) capsule 300 mg  300 mg Oral Nightly Blayne Zabala MD   300 mg at 06/26/25 2026    heparin (porcine) injection 3,200 Units  3,200 Units Intracatheter PRN Blayne Zabala MD   3,200 Units at 06/25/25 1058    [Held by provider] hydrALAZINE (APRESOLINE) tablet 100 mg  100 mg Oral TID Blayne Zabala MD   100 mg at 06/21/25 1941    isosorbide dinitrate (ISORDIL)  tablet 10 mg  10 mg Oral TID - Nitrates Blayne Zabala MD   10 mg at 06/26/25 0851    levothyroxine (SYNTHROID, LEVOTHROID) tablet 100 mcg  100 mcg Oral Q AM Blayne Zabala MD   100 mcg at 06/27/25 0702    montelukast (SINGULAIR) tablet 10 mg  10 mg Oral Nightly Blayne Zabala MD   10 mg at 06/26/25 2026    Morphine sulfate (PF) injection 1 mg  1 mg Intravenous Once PRN Bouchra Trinidad MD        naloxone (NARCAN) injection 0.4 mg  0.4 mg Intravenous Q5 Min PRN Blayne Zabala MD        NIFEdipine XL (PROCARDIA XL) 24 hr tablet 120 mg  120 mg Oral Daily Blayne Zabala MD   120 mg at 06/25/25 1857    nitroglycerin (NITROSTAT) SL tablet 0.4 mg  0.4 mg Sublingual Q5 Min PRN Blayne Zabala MD        oxyCODONE-acetaminophen (PERCOCET) 5-325 MG per tablet 1 tablet  1 tablet Oral Q4H PRN Blayne Zabala MD   1 tablet at 06/27/25 0706    promethazine (PHENERGAN) tablet 12.5 mg  12.5 mg Oral Q6H PRN Blayne Zabala MD   12.5 mg at 06/20/25 2243    sodium chloride 0.9 % flush 10 mL  10 mL Intravenous Q12H Blayne Zabala MD   10 mL at 06/26/25 2026    sodium chloride 0.9 % flush 10 mL  10 mL Intravenous PRN Blayne Zabala MD        sodium chloride 0.9 % infusion 40 mL  40 mL Intravenous PRN Blayne Zabala MD        [Held by provider] spironolactone (ALDACTONE) tablet 25 mg  25 mg Oral Daily Blayne Zabala MD   25 mg at 06/22/25 0905    tamsulosin (FLOMAX) 24 hr capsule 0.4 mg  0.4 mg Oral Nightly Blayne Zabala MD   0.4 mg at 06/26/25 2026    [Held by provider] valsartan (DIOVAN) tablet 320 mg  320 mg Oral Daily Blayne Zabala MD   320 mg at 06/22/25 0905       Past Medical History:  Past Medical History:   Diagnosis Date    3-vessel CAD 08/11/2020    Allergic rhinitis     Anemia     Anxiety disorder 04/27/2020    Arthritis     Asymmetrical sensorineural hearing loss 06/28/2017    Atherosclerosis of native artery of both lower extremities with intermittent claudication 07/18/2019    Avascular necrosis  of femoral head, left 07/11/2020    right hip after surgery    Carotid stenosis     Chronic mucoid otitis media     Chronic rhinitis     COPD (chronic obstructive pulmonary disease)     Coronary artery disease     HEART BYPASS 2004    Crohn's disease of large intestine with other complication 07/30/2020    Chronic diarrhea Colonoscopy July 2020 revealed mild patchy scattered hemosiderin staining with inflammation more so in rectosigmoid area.  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    Iron deficiency anemia 07/14/2020    Mixed hearing loss of left ear     PAD (peripheral artery disease) 08/11/2020    Perianal abscess     Pernicious anemia 08/17/2020    took shots but never diagnosed with b12 deficiency    Personal history of alcoholism 08/11/2020    quit drinking in 2013    Prostatic hypertrophy 08/11/2020    Sensorineural hearing loss     Sepsis with acute renal failure 09/15/2020    Shortness of breath 05/27/2021    Tinnitus     Ventricular tachycardia, nonsustained 07/14/2020    Weight loss 07/11/2020       Past Surgical History:  Past Surgical History:   Procedure Laterality Date    ABOVE KNEE AMPUTATION Right 6/24/2025    Procedure: right above knee amputation;  Surgeon: Blayne Zabala MD;  Location: [facility] OR;  Service: Vascular;  Laterality: Right;    AORTOGRAM Right 4/25/2025    Procedure: RIGHT LOWER EXTREMITY ANGIOGRAM, SHOCKWAVE LITHOTRIPSY, BALLOON ANGIOPLASTY, MYNX CLOSURE;  Surgeon: Gil Pineda DO;  Location:  [facility] HYBRID OR;  Service: Vascular;  Laterality: Right;    AORTOGRAM Left 5/22/2025    Procedure: LEFT LOWER EXTREMITY  ANGIOGRAM, SHOCKWAVE LITHOTRIPSY, BALLOON ANGIOPLASTY, STENT PLACEMENT, MYNX CLOSURE;  Surgeon: Blayne Zabala MD;  Location: [facility] HYBRID OR;  Service: Vascular;  Laterality: Left;    AORTOGRAM Right 5/30/2025    Procedure: AORTOILIAC ANGIOGRAM WITH LEFT LOWER EXTREMITY ANGIOGRAM;  Surgeon: Gil Pineda DO;  Location: [facility] HYBRID OR;  Service: Vascular;  Laterality: Right;    AORTOGRAM Right 6/20/2025    Procedure: right lower extremity arteriogram, shockwave lithotripsy, balloon angioplasty, mynx closue;  Surgeon: Blayne Zabala MD;  Location: [facility] HYBRID OR;  Service: Vascular;  Laterality: Right;    ARTERY SURGERY  2021    right side on neck    CAROTID ENDARTERECTOMY Right 05/10/2021    Procedure: RIGHT CAROTID ENDARTERECTOMY WITH EEG;  Surgeon: Gil Pineda DO;  Location: [facility] HYBRID OR 12;  Service: Vascular;  Laterality: Right;    COLONOSCOPY N/A 07/02/2020    Procedure: COLONOSCOPY WITH ANESTHESIA;  Surgeon: Adrien Brewster MD;  Location: [facility] ENDOSCOPY;  Service: Gastroenterology;  Laterality: N/A;  pre op: diarrhea  post op: polyps  PCP: Joe Velasco MD    COLONOSCOPY N/A 10/13/2020    Procedure: COLONOSCOPY WITH ANESTHESIA;  Surgeon: Adrien Brewster MD;  Location: [facility] ENDOSCOPY;  Service: Gastroenterology;  Laterality: N/A;  Pre: Chronic Diarrhea, Crohn's  Post: AVM  Dr. Neftali Velasco  CO2 Inflation Used    COLONOSCOPY N/A 12/08/2023    Procedure: COLONOSCOPY WITH ANESTHESIA;  Surgeon: Adrien Brewster MD;  Location: [facility] ENDOSCOPY;  Service: Gastroenterology;  Laterality: N/A;  pre chrone's disease  post sub optimal prep, polyp, chrone's      CORONARY ARTERY BYPASS GRAFT  2003    x3    ENDOSCOPY N/A 11/02/2021    Procedure: ESOPHAGOGASTRODUODENOSCOPY WITH ANESTHESIA;  Surgeon: Bridger Bell MD;  Location: [facility] ENDOSCOPY;  Service: Gastroenterology;  Laterality: N/A;  pre anemia;gi bleed  post  gi bleed;schatski ring  Dr. ERIC Velasco    ENDOSCOPY  N/A 10/10/2023    Procedure: ESOPHAGOGASTRODUODENOSCOPY WITH ANESTHESIA;  Surgeon: Adrien Brewster MD;  Location: [facility] ENDOSCOPY;  Service: Gastroenterology;  Laterality: N/A;  preop; anemia  postop esophagitis ; R/O barretts   PCP Randall Beata    EYE SURGERY Bilateral     catorac    INCISION AND DRAINAGE PERIRECTAL ABSCESS N/A 2017    Procedure: INCISION AND DRAINAGE OF JEET ANAL ABSCESS;  Surgeon: Lynette Smith MD;  Location:  [facility] OR;  Service:     INGUINAL HERNIA REPAIR Bilateral 2023    Procedure: INGUINAL HERNIA BILATERAL REPAIR LAPAROSCOPIC WITH DAVINCI ROBOT WITH MESH;  Surgeon: Tahira Rivera MD;  Location:  [facility] OR;  Service: Robotics - DaVinci;  Laterality: Bilateral;    INSERTION HEMODIALYSIS CATHETER N/A 2025    Procedure: HEMODIALYSIS CATHETER PLACEMENT;  Surgeon: Gil Pineda DO;  Location: [facility] HYBRID OR;  Service: Vascular;  Laterality: N/A;    MYRINGOTOMY W/ TUBES Left 2017    06/10/2016    TONSILLECTOMY      TOTAL HIP ARTHROPLASTY Right        Family History  Family History   Problem Relation Age of Onset    Breast cancer Mother     Dementia Father     Glaucoma Father     No Known Problems Daughter     Colon polyps Neg Hx     Colon cancer Neg Hx        Social History  Social History     Socioeconomic History    Marital status:    Tobacco Use    Smoking status: Former     Current packs/day: 0.00     Average packs/day: 0.5 packs/day for 25.8 years (12.9 ttl pk-yrs)     Types: Cigarettes     Start date:      Quit date: 10/13/2013     Years since quittin.7     Passive exposure: Past    Smokeless tobacco: Never    Tobacco comments:     quit    Vaping Use    Vaping status: Former    Substances: Nicotine    Devices: Pre-filled or refillable cartridge   Substance and Sexual Activity    Alcohol use: Not Currently    Drug use: No    Sexual activity: Not Currently     Partners: Female       Review of Systems:  History obtained from  chart review and the patient  General ROS: No fever or chills  Respiratory ROS: No cough, shortness of breath, wheezing  Cardiovascular ROS: No chest pain or palpitations  Gastrointestinal ROS: No abdominal pain or melena  Genito-Urinary ROS: No dysuria or hematuria  Psych ROS: No anxiety and depression  14 point ROS reviewed with the patient and negative except as noted above and in the HPI unless unable to obtain.    Objective:  Patient Vitals for the past 24 hrs:   BP Temp Temp src Pulse Resp SpO2   06/27/25 0300 150/40 98.2 °F (36.8 °C) Oral 69 18 100 %   06/27/25 0012 156/48 98 °F (36.7 °C) Oral 67 18 96 %   06/26/25 1928 158/47 98.1 °F (36.7 °C) Oral 70 18 98 %   06/26/25 1757 110/56 -- -- -- -- --   06/26/25 1531 112/50 98 °F (36.7 °C) Oral 51 16 96 %   06/26/25 1100 118/42 98 °F (36.7 °C) Oral 60 16 96 %     No intake or output data in the 24 hours ending 06/27/25 0931    General: awake/alert   Chest:  clear to auscultation bilaterally without respiratory distress  CVS: regular rate and rhythm  Abdominal: soft, nontender, positive bowel sounds  Extremities: right AKA  Skin: warm and dry without rash      Labs:  Results from last 7 days   Lab Units 06/26/25  0531 06/25/25  1832 06/25/25  0305 06/24/25  0420   WBC 10*3/mm3 10.25  --  6.97 7.88   HEMOGLOBIN g/dL 8.8* 7.0* 7.1* 7.5*   HEMATOCRIT % 26.9* 21.4* 22.9* 23.6*   PLATELETS 10*3/mm3 118*  --  121* 107*         Results from last 7 days   Lab Units 06/26/25  0531 06/25/25  0305 06/24/25  0420 06/23/25  0339   SODIUM mmol/L 132* 135* 137 135*   POTASSIUM mmol/L 4.1 4.4 4.0 3.7   CHLORIDE mmol/L 99 104 105 101   CO2 mmol/L 22.0 19.0* 21.0* 22.0   BUN mg/dL 18.7 28.9* 24.2* 44.4*   CREATININE mg/dL 1.98* 2.89* 2.25* 3.81*   CALCIUM mg/dL 8.6 8.6 8.6 8.6   EGFR mL/min/1.73 34.2* 21.7* 29.3* 15.6*   BILIRUBIN mg/dL 0.6 0.3  --  0.3   ALK PHOS U/L 149* 127*  --  144*   ALT (SGPT) U/L <5 <5  --  <5   AST (SGOT) U/L 34 24  --  12   GLUCOSE mg/dL 113* 89 103* 99  "      Radiology:   Imaging Results (Last 72 Hours)       ** No results found for the last 72 hours. **            Culture:  No results found for: \"BLOODCX\", \"URINECX\", \"WOUNDCX\", \"MRSACX\", \"RESPCX\", \"STOOLCX\"      Assessment    End stage renal disease on hemodialysis  Hypertension  Anemia of CKD  CLL  Severe peripheral vascular disease--s/p multiple vascular procedures  S/p right AKA on 6/24  Chronic diastolic CHF  Hyponatremia     Plan:   Dialysis today  Monitor labs      Slava Tate, APRN  6/27/2025  09:31 CDT    "

## 2025-06-27 NOTE — TELEPHONE ENCOUNTER
Notified by Crystal on 3c that patient has no blood available for O negative and he has had o positive x 1 and they are afraid of seroconversion. Informed Dr. Zabala that due to the blood shortage we have no O negative blood and was instructed to notify 3c to call hospital. Spoke with Crystal again and informed her per Dr. Zabala's orders that she should speak with Hospitalist.

## 2025-06-27 NOTE — NURSING NOTE
Pt had a critical hemoglobin of 6.4. Dr. Zabala was notified. 2 units were ordered. Lab notified this RN that they lab currently only has O positive blood. The patient is O negative. Dr. Zabala was notified and asked that the hospitalist be consulted on this. Dr. Pineda was notified and ordered a hematology consult. Currently awaiting call back from hematology.

## 2025-06-27 NOTE — NURSING NOTE
This morning at shift exchange report this nurse noted some serosanguinous drainage on the side of the right amputation dressing with small amount on gown. PT was taken to dialysis. Pt return from dialysis around 1200, assessed drsg at that time and noted no change. At approx 1315 noted bleeding at this time and notified Dr. Zabala. Dr. Zabala saw patient at bedside and changed the drsg. Plese see orders

## 2025-06-27 NOTE — CASE MANAGEMENT/SOCIAL WORK
Continued Stay Note   Nino     Patient Name: Ricardo Hugo  MRN: [id number]  Today's Date: 6/27/2025    Admit Date: 6/19/2025    Plan: [facility]   Discharge Plan       Row Name 06/27/25 1437       Plan    Plan [facility]    Patient/Family in Agreement with Plan yes    Plan Comments Pt has a bed at [facility]. Will follow for d/c.                   Discharge Codes    No documentation.                 Expected Discharge Date and Time       Expected Discharge Date Expected Discharge Time    Jun 30, 2025               LINNETTE Ramos

## 2025-06-27 NOTE — NURSING NOTE
[facility]  DIALYSIS TREATMENT SUMMARY     Note: Consult with the attending physician for patient treatment orders, this document is not a physician order.     Patient Information  Patient Ricardo Hugo  Date of Birth February 22, 1950  Chart Number [id number]  Location [facility]  Location MRN [id number]  Gender Male  SSN (last 4)   Treatment Information  Treatment Type Hemodialysis  Treatment Id [id number]  Start Time June 27, 2025 07:31  End Time June 27, 2025 11:05  Acutal Duration 03:34  Treatment Balances  Total Saline Administered 500  Net Fluid Balance -1300  Hemodialysis Orders  Therapy Standard  Orders Verified Time 06/27/2025 07:50   Date Verified 06/27/2025  Duration 3:30  Isolated UF/Bypass No  BFR (mL) 400  DFR (mL) 600  Dialyzer Type OPTIFLUX 160NR  UF Order UF Range  UF Range (mL) 1000 - 2000  With BP Parameters Yes  As Tolerated Yes  Crit-Line used No  Heparin Initial Units Bolus No  Heparin IV Maintenance Bolus No  Heparin IV Infusion No  Potassium (mEq/L) 3  Calcium (mEq/L) 2.5  Bicarb (mg/dL) 35  Sodium (mEq/L) 137  Additional Orders to keep sbp greater than 90  Clinician Kristan Beck RN  Dialysis Access  Access Type Central Venous Catheter  Central Venous Catheter  Access Type Catheter - Tunneled  Access Location Chest Wall - R  1st Use Catheter Verified by Previous Use  Catheter Care Completed per Policy Yes  Dressing Dry and Intact on Arrival Yes  Dressing Changed No  Type of Dressing Film Biopatch/CHG  Last Date Changed 06/25/2025  If No select Reason Dressing Change Not Indicated per Policy  Type of HD Caps Curos  HD Caps Changed Yes  CVC Line Education Provided Yes - Dressing Change Frequency  Vitals  Pre-Treatment Vitals  Time Is BP being recorded? Pre BP Map BP Method Pulse RR Temp How was Weight Obtained? Pre Weight Previous Dry Weight Previous Post Weight Metric Target Fluid Removal (mL) Dialysate Confirmed Clinician  06/27/2025  07:27 BP/Map 131/55 (80) Noninvasive 73 16 98.2 How Obtained: Reported Floor Weight 39.7   Kgs 2000 Yes Kristan Beck RN  Comments: pt is a & o x 3. hemodynamically stable.  Intra Procedure Vitals  Rec Type Time Is BP being recorded? BP Map Pulse RR BFR DFR AP  TMP UFR RMVD Bolus NSS Flush Chills Safety Check Crit-Line HCT Crit-Line BV% Clinician  E 06/27/2025 07:31 BP/Map 128/54 (79) 72 16 200 600 -60 10 30 570 0    Yes   Kristan Beck RN  Comments: tx initiated without difficulty. lines visible and secure. hemosafe applied. pt stable.  E 06/27/2025 07:45 BP/Map 122/53 (76) 75 16 400 600 -150 100 30 570 132    Yes   Kristan Beck RN  Comments: bfr increased to prescribed rate of 400. vital signs stable. no complications. pt sleeping. even and non-labored breathing.  E 06/27/2025 08:00 BP/Map 119/52 (74) 76 16 400 600 -150 100 50 570 388    Yes   Erika Hines RN  Comments: HD access visible, venous clips present, lines secured. Patient resting quietly in bed with eyes closed. Treatment in progress, will maintain current treatment plan at this time.  E 06/27/2025 08:30 BP/Map 102/52 (69) 75 16 400 600 -150 100 50 570 553    Yes   Erika Hines RN  Comments: HD access visible, venous clips present, lines secured. Patient resting quietly in bed with eyes closed. Treatment in progress, will maintain current treatment plan at this time.  E 06/27/2025 09:00 BP/Map 108/65 (79) 77 16 400 600 -140 100 50 570 794    Yes   Erika Hines RN  Comments: HD access visible, venous clips present, lines secured. Patient resting quietly in bed with eyes closed. Treatment in progress, will maintain current treatment plan at this time.  E 06/27/2025 09:30 BP/Map 93/48 (63) 81 16 400 600 -140 100 50 440 1120    Yes   Erika Hines RN  Comments: UF decreased, patient b/p lowering. HD access visible, venous clips present, lines secured. Patient responds to HD RN appropriately. Treatment in progress, will maintain current  treatment plan at this time.  E 06/27/2025 09:55 BP/Map               Yes   Erika Hines RN  Comments: Requesting pain medication; scheduled med not due at this time, per 3C CN. Patient informed of status, verbalized understanding.  E 06/27/2025 10:00 BP/Map 105/55 (72) 80 16 400 600 -140 100 50 440 1327    Yes   Erika Hines, DEMETRIUS  Comments: HD access visible, venous clips present, lines secured. Patient resting quietly in bed with eyes closed. Treatment in progress, will maintain current treatment plan at this time.  E 06/27/2025 10:30 BP/Map 112/45 (67) 79 16 400 600 -140 100 50 440 1569    Yes   Erika Hines RN  Comments: Changed patient linen, due to red drainage noted on both patient gown and bedding sheets. Patient alert, talking to HD RN. HD access visible, venous clips present, lines secured. Patient resting quietly in bed with eyes closed. Treatment in progress, will maintain current treatment plan at this time.  E 06/27/2025 11:00 BP/Map 91/50 (64) 83 16 400 600 -140 100 50 440 1769    Yes   Erika Hines RN  Comments: HD access visible, venous clips present, lines secured. Patient resting quietly in bed with eyes closed. Treatment in progress, will maintain current treatment plan at this time.  E 06/27/2025 11:05 BP/Map 120/57 (78) 75 16       1800 Yes 250  Yes   Erika Hines RN  Comments: Tx complete, blood returned.  Post Treatment Vitals  Time Is BP being recorded? BP Map Pulse RR Temp How was Weight Obtained? Post Weight Metric BVP UF Goal Ordered NSS Given Intra-Procedure Total Machine UF Removed (mL) Crit-Line Ending Profile Crit-Line Refill Crit-Line Ending HCT Crit-Line Max BV% Clinician  06/27/2025 11:20 BP/Map 136/59 (85) 70 16 97.9 How Obtained: Bed scale 38.4 Kgs 80.5 2000 250 1800     Erika Hines RN  Comments: Tolerated treatment well today.  Safety checks include: access uncovered and secured, Hemaclip secured for all central line access, machine checks performed, and alarm limits  confirmed.  Labs  Hepatitis  HBsAG Lab Result HBsAG Lab Result HBsAG Draw Date Transient Antigenemia(MD Diagnosis Only) Anti-HBs Lab Result Anti-HBs Lab Value Anti-HBs Draw Date Documented By Documented Date Hepatitis Status Hepatitis Status  Negative  06/09/2025  Negative  04/30/2025 Kristan Beck 06/27/2025  Susceptible  Notes:   Pre-Treatment Hepatitis Precautions Copy of hepatitis results verified in hospital EMR Yes Signing  Patient tx with immune pts only Yes Hepatitis Information Entered By Kristan Beck RN Signed By Kristan Beck RN  Pre-Treatment Handoff  Staff Report Received Yes  Report Given by Primary Nurse Dary Thurston  Time 06:50  Patient Arrival  Patient ID Verified Date of Birth  Full Name  Patient Consent to treatment verified Yes  Blood Transfusion Consent Verified N/A  Treatment Comments  Treatment Notes Hemodynamically stable post HD today.  Post-Treatment Handoff  Report Given to Primary Nurse Dary Mccord RN  Time Report Given 11:25  Report Given By Erika Hines RN  Machine Validation  Time 06:55  Date 6/27/2025  Auto Alarm Test Passed Yes  Machine Serial # [id number]  Portable RO Yes  RO Serial# 14  Residual Bleach Negative N/A  Was a manufactured mix used? Yes  Machine Log Completed Yes  Total Chlorine (less than 0.1)? Yes  Total Chlorine Log Completed Yes  Bicarb BiBag  Bibag Size 900  Machine Temp 36.0  Machine Conductivity 13.7  Meter Type N/A  Meter Conductivity   Independent Conductivity 13.6  pH Status Pass Pass  pH   TCD Value 13.6  TCD Alarm with +/- 0.5 Yes  NVL enabled validated 100 asymmetric Yes  Safety check complete Kristan Beck RN  Second Verification Performed? Yes  Second Verification Performed By Erika Hines RN  Reason Not Verified   Serum Lab Values  Time BUN Creatinine Na K (mEq/L) Cl CO2 Ca (mEq/L) Phos Mg (mg/dL) Alb (g/dL) Glucose Hgb Hct WBC Plt PT aPTT INR Other Clinician  06/27/2025  05:31 18.7 1.98 132 4.1 99 22 8.6 - - 3.0 113 8.8 26.9 10.25 118     Kristan Beck RN  Comments:   Facility Information Bed # station 4 Isolation Information  Facility Information Stat Treatment No Isolation Required? N  Admission Date 06/19/2025 Patient Type Chronic dialysis patient with diagnosis of ESRD Completed by  Ordering MD Dr. Rueda Patient Chronic Unit [facility] information Entered by Kristan Beck RN  Account/Finance Number [id number] Patient Home Unit [facility] Facility Information  Admission Status InPatient Code Status Full Code Notes pt is a & o x 3. hemodynamically stable.  Location Dialysis Suite Multi Diagnosis   Room # 361 Diagnosis Vascular occlusion   Start Treatment Time Out Confirmed by Kristan Beck/Erika Hines Correct access site verified Yes  Treatment Initiation Connections Secured Time Out Completed 07:22 Treatment Start Date 06/27/2025  Saline line double clamped Correct patient verified Yes Treatment Start Time 07:31  Hemaclip Applied Correct procedure verified Yes Patient/Family questions and concerns addressed Yes  Pre Focused Assessment Respiratory Efforts Unlabored GI Soft  Access Notes room air  Bowel sounds present  Signs and Symptoms of Infection? No Edema  Non distended  Pain Screening Location None  Nontender  Does the patient have pain? Yes Cardiac [facility] Nurse Notified  Heart Rhythm Regular  Voids  Notes pt advised he had pain medication just prior to coming to dialysis Telemetry Yes Completed by  Respiratory Skin Pre Treatment Focused Assessment Completed By Kristan Beck RN  Lung Sounds Diminished Skin Warm Time 07:29  Location Bilateral  Dry Signing  Bases LOC Signed By Kristan Beck RN  Position Bases LOC Alert and Oriented x3   Anterior GI / Bowels   Education  Infection Prevention Focus or Topic Hemodialysis treatment review  Patient Education  Catheter Reduction Method Knowledge / Understanding Assessed Teach back  Patient  Educated? Yes Method Knowledge / Understanding Assessed Teach back Patient Education Reinforced By  Focus or Topic Hemodialysis treatment review Family Education Provided? N/A Patient Education Reinforced by Kristan Beck RN  Access Maintenance Caregiver Education Provided? Yes   Post-Treatment Delay Note Slow transport after request entered. Transport did not come; lift team returned patient to floor. Notes Verbal to primary nurse.  Post Treatment Delay Machine Disinfection Requirement Completed by  Delay Y HD machine external disinfection completed per policy Yes Post Treatment Form Completed By Erika Hines RN  Delay Type Delay Due to Hospital Transportation PRO external disinfection completed per policy Yes   Delay Hours 0.50 Post Treatment General Information   Post Focused Assessment Lung Sounds Diminished LOC  Changes from Pre Focused Assessment Location Bilateral LOC Alert and Oriented x3  Changes from Pre Treatment Focused Assessment? No  Bases GI / Bowels  Access Position Bases GI Soft  Cath Packed with heparin 1000 units/mL  Anterior  Bowel sounds present  Access Port(ml) 1.6 Respiratory Efforts Unlabored  Non distended  Return Port(ml) 1.6 Notes room air  Nontender  Catheter clamped and capped Yes Edema   Access Flow Good Location None  Voids  Dialyzer Clearance Streaked Cardiac Completed by  Dialyzer Clearance Times had to be reversed Heart Rhythm Regular Post Treatment Focused Assessment Completed by Erika Hines RN  Pain Screening Telemetry Yes Date 06/27/2025  Does the patient have pain? No Skin Time 11:21  Notes pt advised he had pain medication just prior to coming to dialysis Skin Warm Signing  Respiratory  Dry Signed By Erika Hines RN

## 2025-06-27 NOTE — TELEPHONE ENCOUNTER
Received call from  stating patients dressing was oozing to his stump and that he was complaining of increased pain and that his Morphine has . Will notify md and cecilia

## 2025-06-27 NOTE — PLAN OF CARE
Goal Outcome Evaluation:  Plan of Care Reviewed With: patient        Progress: no change  Outcome Evaluation: Patient intermittently confused, encouraged to turn q2h, stump appears to be leaking serosang, med prn for c/o pain, tele NSR, dialysis today, cont pulse ox, bed check on.

## 2025-06-27 NOTE — PROGRESS NOTES
[facility] Medicine Services  INPATIENT PROGRESS NOTE    Patient Name: Ricardo Hugo  Date of Admission: 6/19/2025  Today's Date: 06/27/25  Length of Stay: 8  Primary Care Physician: Nathan Zimmer MD    Subjective   Chief Complaint: f/u PAD/occlusion    HPI     He is on day 7 of hospitalization  Presented with right foot pain  Admitted with provisional diagnosis of vascular occlusion of right lower extremity    Multiple comorbidities:   End-stage renal disease on dialysis  Hypertension  COPD not in exacerbation    Procedure:    6/20/25  PREOPERATIVE DIAGNOSIS: Vascular occlusion [I99.8]  CL TI right lower extremity rest pain     POSTOPERATIVE DIAGNOSIS: Post-Op Diagnosis Codes:     * Vascular occlusion [I99.8]  Same     PROCEDURE PERFORMED:   Left common femoral artery access with ultrasound guidance  Right lower extremity arteriogram with third order selection angiographic interpretation  intravascular lithotripsy of the P3 popliteal artery and TP trunk with a 3 x 80 shockwave balloon  PTA of the popliteal artery, TP trunk, and peroneal artery with a 3 to 2.5 x 210 Tammy cross balloon   PTA of the right posterior tibial artery 1.5 x 40 Tammy cross balloon  Closure of the left common femoral artery access site with a 6 Danish minx     SURGEON: Denzel Zabala MD     6/24/2025     PREOPERATIVE DIAGNOSIS: Vascular occlusion [I99.8]     POSTOPERATIVE DIAGNOSIS: Post-Op Diagnosis Codes:     * Vascular occlusion [I99.8]     PROCEDURE PERFORMED:   Right above knee amputation     SURGEON: Denzel Zabala MD      ANESTHESIA: General.     PREPARATION: Routine.     STAFF: Circulator: Francisco Lopez RN  Scrub Person: Adonis Anaya; Tasha Browning     Estimated Blood Loss: <500ml     SPECIMENS: right leg       Seen today post dialysis.  Awake and interactive.  Eating lunch.  Complains of achy pain at the right thigh.  No other complaints.  No chest pain.  No shortness of breath.  No fevers  "noted.    Received couple of units of packed RBC on June 24 June 2025 for hemoglobin of 7.  Posttransfusion hemoglobin is 8.8  Platelet count 118 (121)      Spoke to the daughter who is at bedside.  \"I think he is overmedicated.\"  She rationalized he was in pain that is why he had to take some medication.  He did not participate quite well with therapy because he was somewhat sleepy according to daughter    6/27  Patient underwent dialysis for 3 hours and 30 minutes with net fluid balance of 1300.  Creatinine most recent value was 1.98  Therapist indicates to skilled nursing facility upon discharge  Nurses note reviewed.  Reports pain 10 out of 10 on the right stump.  Review of Systems     No chest pain or shortness of breath  No difficulty breathing  No chest pain  No reported urinary or bowel disturbance    Complete review of system performed and pertinent positive and negative are mentioned        Objective    Temp:  [98 °F (36.7 °C)-98.2 °F (36.8 °C)] 98.1 °F (36.7 °C)  Heart Rate:  [51-80] 80  Resp:  [16-18] 16  BP: (110-158)/(40-56) 129/46  Physical Exam    Laying flat in bed  Not in any distress  Taking adequate oxygenation  GEN:  in NAD, appears thin/frail  HEENT:Trachea midline  Lungs: no wheezing/rales/rhonchi  Heart: RRR, +S1/s2, no rub  ABD: soft, nt/nd, +BS, no guarding/rebound  Extremities: (Right AKA, bandage intact and clean/dry.) --- Deferred to vascular.      Psych: normal mood & affect        Results Review:  I have reviewed the labs, radiology results, and diagnostic studies.    Laboratory Data:   Results from last 7 days   Lab Units 06/26/25  0531 06/25/25  1832 06/25/25  0305 06/24/25  0420   WBC 10*3/mm3 10.25  --  6.97 7.88   HEMOGLOBIN g/dL 8.8* 7.0* 7.1* 7.5*   HEMATOCRIT % 26.9* 21.4* 22.9* 23.6*   PLATELETS 10*3/mm3 118*  --  121* 107*        Results from last 7 days   Lab Units 06/26/25  0531 06/25/25  0305 06/24/25  0420 06/23/25  0339   SODIUM mmol/L 132* 135* 137 135*   POTASSIUM " "mmol/L 4.1 4.4 4.0 3.7   CHLORIDE mmol/L 99 104 105 101   CO2 mmol/L 22.0 19.0* 21.0* 22.0   BUN mg/dL 18.7 28.9* 24.2* 44.4*   CREATININE mg/dL 1.98* 2.89* 2.25* 3.81*   CALCIUM mg/dL 8.6 8.6 8.6 8.6   BILIRUBIN mg/dL 0.6 0.3  --  0.3   ALK PHOS U/L 149* 127*  --  144*   ALT (SGPT) U/L <5 <5  --  <5   AST (SGOT) U/L 34 24  --  12   GLUCOSE mg/dL 113* 89 103* 99       Culture Data:   No results found for: \"BLOODCX\", \"URINECX\", \"WOUNDCX\", \"MRSACX\", \"RESPCX\", \"STOOLCX\"    Radiology Data:   Imaging Results (Last 24 Hours)       ** No results found for the last 24 hours. **            I have reviewed the patient's current medications.     Assessment/Plan   Assessment  Active Hospital Problems    Diagnosis     **Vascular occlusion     ESRD (end stage renal disease) on dialysis     Chronic diastolic (congestive) heart failure     CLL (chronic lymphocytic leukemia)     Peripheral arterial disease     Essential hypertension        Medical Decision Making  Number and Complexity of problems/Treatment Plan  #1 vascular occlusion - with intervention on 6/22 right popliteal artery and peroneal artery.  Also had PTA to the right posterior tibial artery.  Was on heparin drip preprocedure. Post procedure on aspirin, Plavix, statin.  Fortunately despite attempts at revascularization patient continues to have PAD with poor blood flow to his lower extremity.  He developed gangrenous changes of the right third toe.  As such was taken for AKA  June 24.  Continue postoperative care per vascular.    #2 ESRD -   Nephrology following.  Continue as per schedule by nephrology     #3 chronic diastolic CHF -appears euvolemic.  No signs of exacerbation.    #4 hypertension -Home BP meds resumed.  Monitor.  He is on extensive regimen of nifedipine, Isordil, hydralazine, Coreg, Aldactone.  Hydralazine was put on hold 6/22.  Patient continues to intermittently get some blood pressures held by nursing staff due to low diastolic pressures.  BP swings " noted    #5 chronic constipation -continue bowel regimen.  Positive BM    #6 anemia of chronic disease -monitor closely.  Status post couple of units of transfusion on June 24 and 25; on repeat troponin within    #7 history of CLL    Medications as outlined below  aspirin, 81 mg, Oral, Daily  atorvastatin, 10 mg, Oral, Nightly  carvedilol, 12.5 mg, Oral, BID With Meals  clopidogrel, 75 mg, Oral, Daily  epoetin cecile/cecile-epbx, 10,000 Units, Subcutaneous, Once per day on Monday Wednesday Friday  gabapentin, 300 mg, Oral, Nightly  [Held by provider] hydrALAZINE, 100 mg, Oral, TID  isosorbide dinitrate, 10 mg, Oral, TID - Nitrates  levothyroxine, 100 mcg, Oral, Q AM  montelukast, 10 mg, Oral, Nightly  NIFEdipine XL, 120 mg, Oral, Daily  senna-docusate sodium, 2 tablet, Oral, BID  sodium chloride, 10 mL, Intravenous, Q12H  [Held by provider] spironolactone, 25 mg, Oral, Daily  tamsulosin, 0.4 mg, Oral, Nightly  [Held by provider] valsartan, 320 mg, Oral, Daily      Vascular anticipate will be cleared for discharge sometime Monday once dressing is taken down.  Spoke to nurse Dary to check with Dr. Zabala regarding expected color of the stump.  I am not sure if this is just the color of the Betadine or something else and particularly with an increase in pain.        Place a Percocet 10 mg for severe pain  I changed the parameter for carvedilol.  I noticed there is no parameter for nifedipine XL and isosorbide nitrate but otherwise there are medicines that were placed on hold including spironolactone, valsartan and hydralazine.  Will review need for all this medications or need for lower dose.    Conditions and Status  Fair.     [facility] Data  External documents reviewed: Reviewed  Cardiac tracing (EKG, telemetry) interpretation: None  Radiology interpretation: Reports reviewed  Labs reviewed: As above  Any tests that were considered but not ordered: None     Decision rules/scores evaluated (example [id number], Wells, etc):  None     Discussed with: Patient, nursing, SW     Care Planning  Shared decision making: Patient and consultants    Code status and discussions: full code    Disposition  Social Determinants of Health that impact treatment or disposition: none      Anticipate discharge plans to go back to [facility] sometime Monday depending on recommendation by vascular surgery    Electronically signed by Jamie Pineda MD, 06/27/25, 12:42 CDT.    Patient had postoperative bleeding.  Hemoglobin dropped to 6.4 with hematocrit of 19.6 and platelet of 125.  Dr. Zabala requested information regarding blood transfusion.  It is my understanding that the patient is O- and we have limited blood availability in blood bank.      A question was raised regarding other ways to address this blood loss.  My experience is limited to Jehova's witness.  I felt this would be best answered by hematologist. T herefore, I requested for consultation with hematology to assist.  Dr. Moses opened the discussion to Dr. Duran.  Dr. Duran approved 2 units of O+ blood.  With their approval, okay to transfuse according to what has been approved by pathologist.  Case discussed with nursing staff as well.    Electronically signed by Jamie Pineda MD, 06/27/25, 5:55 PM CDT.

## 2025-06-27 NOTE — PROGRESS NOTES
LOS: 8 days   Patient Care Team:  Nathan Zimmer MD as PCP - General (Internal Medicine)  Adrien Brewster MD as Consulting Physician (Gastroenterology)  Eleuterio Farley MD (Inactive) as Cardiologist (Cardiology)  Elena Jon APRN as Referring Physician (Vascular Surgery)  Bolivar Rueda MD as Consulting Physician (Nephrology)  Slava Tate APRN as Nurse Practitioner (Family Medicine)  New Omalley MD as Consulting Physician (Pulmonary Disease)  Becky Camacho APRN as Nurse Practitioner (Hematology and Oncology)  Gil Pineda DO as Consulting Physician (Vascular Surgery)  Malu Lozano, DEMETRIUS as Ambulatory  ([facility])    Chief Complaint: RLE rest pain and wounds    Subjective     Patient Complaints: Called to bedside this afternoon due to bleeding at stump site.     Objective     Vital Signs  Temp:  [98 °F (36.7 °C)-98.2 °F (36.8 °C)] 98.1 °F (36.7 °C)  Heart Rate:  [51-80] 80  Resp:  [16-18] 16  BP: (110-158)/(40-56) 129/46    NAD.   AFVSS.   R AKA dressing with bloody drainage. No significant hematoma appreciated.  Predominantly skin edge bleeding.     Laboratory Data:   Results from last 7 days   Lab Units 06/26/25  0531 06/25/25  1832 06/25/25  0305 06/24/25  0420   WBC 10*3/mm3 10.25  --  6.97 7.88   HEMOGLOBIN g/dL 8.8* 7.0* 7.1* 7.5*   HEMATOCRIT % 26.9* 21.4* 22.9* 23.6*   PLATELETS 10*3/mm3 118*  --  121* 107*       Results from last 7 days   Lab Units 06/26/25  0531 06/25/25  0305 06/24/25  0420 06/23/25  0339   SODIUM mmol/L 132* 135* 137 135*   POTASSIUM mmol/L 4.1 4.4 4.0 3.7   CHLORIDE mmol/L 99 104 105 101   CO2 mmol/L 22.0 19.0* 21.0* 22.0   BUN mg/dL 18.7 28.9* 24.2* 44.4*   CREATININE mg/dL 1.98* 2.89* 2.25* 3.81*   CALCIUM mg/dL 8.6 8.6 8.6 8.6   BILIRUBIN mg/dL 0.6 0.3  --  0.3   ALK PHOS U/L 149* 127*  --  144*   ALT (SGPT) U/L <5 <5  --  <5   AST (SGOT) U/L 34 24  --  12   GLUCOSE mg/dL 113* 89 103* 99                   Medication Review: Reviewed    Assessment & Plan       Vascular occlusion    Essential hypertension    Peripheral arterial disease    CLL (chronic lymphocytic leukemia)    Chronic diastolic (congestive) heart failure    ESRD (end stage renal disease) on dialysis          Plan for disposition:  75-year-old male with right lower extremity ischemic rest pain.  S/p R AKA on 6.24.25    -Pain controlled. Cont current regimen.   -Hgb 8.8 yesterday from 7 after PRBC given.  Pt hgb was around 7 at the time of surgery.  Dressing was CDI without bloody drainage until patient went for dialysis.  Bloody drainage noted after dialysis.  Pt has had issues with bleeding from groin sites after dialysis after arteriograms.  Will discuss with nephrology. Do NOT hep lock permcath.   -Hold plavix.   -CBC ordered.   -Keep wound clean and covered.  -Will plan on taking dressing down on Monday. Anticipate patient will be cleared for DC from a vascular standpoint at that time.   -DCP ongoing.   -Hospitalist admitting appreciate their assistance.          Blayne Zabala MD  Vascular Surgery  [phone]  06/27/25  15:29 CDT

## 2025-06-28 LAB
ANION GAP SERPL CALCULATED.3IONS-SCNC: 7 MMOL/L (ref 5–15)
BUN SERPL-MCNC: 23.6 MG/DL (ref 8–23)
BUN/CREAT SERPL: 11 (ref 7–25)
CALCIUM SPEC-SCNC: 8.3 MG/DL (ref 8.6–10.5)
CHLORIDE SERPL-SCNC: 98 MMOL/L (ref 98–107)
CO2 SERPL-SCNC: 28 MMOL/L (ref 22–29)
CREAT SERPL-MCNC: 2.14 MG/DL (ref 0.76–1.27)
DEPRECATED RDW RBC AUTO: 59.3 FL (ref 37–54)
EGFRCR SERPLBLD CKD-EPI 2021: 31.1 ML/MIN/1.73
ERYTHROCYTE [DISTWIDTH] IN BLOOD BY AUTOMATED COUNT: 18.1 % (ref 12.3–15.4)
GLUCOSE SERPL-MCNC: 108 MG/DL (ref 65–99)
HCT VFR BLD AUTO: 25 % (ref 37.5–51)
HGB BLD-MCNC: 8.3 G/DL (ref 13–17.7)
MCH RBC QN AUTO: 31.4 PG (ref 26.6–33)
MCHC RBC AUTO-ENTMCNC: 33.2 G/DL (ref 31.5–35.7)
MCV RBC AUTO: 94.7 FL (ref 79–97)
PLATELET # BLD AUTO: 117 10*3/MM3 (ref 140–450)
PMV BLD AUTO: 9.9 FL (ref 6–12)
POTASSIUM SERPL-SCNC: 4.4 MMOL/L (ref 3.5–5.2)
RBC # BLD AUTO: 2.64 10*6/MM3 (ref 4.14–5.8)
SODIUM SERPL-SCNC: 133 MMOL/L (ref 136–145)
WBC NRBC COR # BLD AUTO: 7.64 10*3/MM3 (ref 3.4–10.8)

## 2025-06-28 PROCEDURE — 85027 COMPLETE CBC AUTOMATED: CPT | Performed by: INTERNAL MEDICINE

## 2025-06-28 PROCEDURE — 80048 BASIC METABOLIC PNL TOTAL CA: CPT | Performed by: INTERNAL MEDICINE

## 2025-06-28 PROCEDURE — 97530 THERAPEUTIC ACTIVITIES: CPT

## 2025-06-28 RX ADMIN — ISOSORBIDE DINITRATE 10 MG: 10 TABLET ORAL at 12:40

## 2025-06-28 RX ADMIN — GABAPENTIN 300 MG: 300 CAPSULE ORAL at 21:28

## 2025-06-28 RX ADMIN — ATORVASTATIN CALCIUM 10 MG: 10 TABLET, FILM COATED ORAL at 21:29

## 2025-06-28 RX ADMIN — DOCUSATE SODIUM 50 MG AND SENNOSIDES 8.6 MG 2 TABLET: 8.6; 5 TABLET, FILM COATED ORAL at 21:29

## 2025-06-28 RX ADMIN — ACETAMINOPHEN 650 MG: 325 TABLET, FILM COATED ORAL at 16:01

## 2025-06-28 RX ADMIN — ISOSORBIDE DINITRATE 10 MG: 10 TABLET ORAL at 08:25

## 2025-06-28 RX ADMIN — ASPIRIN 81 MG: 81 TABLET, COATED ORAL at 08:32

## 2025-06-28 RX ADMIN — MONTELUKAST SODIUM 10 MG: 10 TABLET, COATED ORAL at 21:29

## 2025-06-28 RX ADMIN — DOCUSATE SODIUM 50 MG AND SENNOSIDES 8.6 MG 2 TABLET: 8.6; 5 TABLET, FILM COATED ORAL at 08:25

## 2025-06-28 RX ADMIN — CARVEDILOL 12.5 MG: 6.25 TABLET, FILM COATED ORAL at 21:29

## 2025-06-28 RX ADMIN — CARVEDILOL 12.5 MG: 6.25 TABLET, FILM COATED ORAL at 08:26

## 2025-06-28 RX ADMIN — OXYCODONE AND ACETAMINOPHEN 1 TABLET: 325; 10 TABLET ORAL at 13:20

## 2025-06-28 RX ADMIN — LEVOTHYROXINE SODIUM 100 MCG: 0.1 TABLET ORAL at 06:23

## 2025-06-28 RX ADMIN — OXYCODONE AND ACETAMINOPHEN 1 TABLET: 325; 10 TABLET ORAL at 06:23

## 2025-06-28 RX ADMIN — Medication 10 ML: at 21:29

## 2025-06-28 RX ADMIN — TAMSULOSIN HYDROCHLORIDE 0.4 MG: 0.4 CAPSULE ORAL at 21:29

## 2025-06-28 NOTE — PROGRESS NOTES
[facility] Medicine Services  INPATIENT PROGRESS NOTE    Patient Name: Ricardo Hugo  Date of Admission: 6/19/2025  Today's Date: 06/28/25  Length of Stay: 9  Primary Care Physician: Nathan Zimmer MD    Subjective   Chief Complaint: f/u PAD/occlusion    HPI   Patient seen sleeping on arrival with family at bedside.  He awoken to voice easily.  Interactive and pleasant.  Pain he saying is still about 8 at times but overall doing better.  Responding medicines.  No new complaints.  Denies any chest pain or shortness of breath.  No other adverse events noted.      Review of Systems   All pertinent negatives and positives are as above. All other systems have been reviewed and are negative unless otherwise stated.     Objective    Temp:  [97.7 °F (36.5 °C)-99.4 °F (37.4 °C)] 97.8 °F (36.6 °C)  Heart Rate:  [67-88] 69  Resp:  [14-18] 14  BP: (108-159)/(40-88) 134/40  Physical Exam  GEN: Awake, alert, interactive, in NAD, appears thin/frail  HEENT: PERRLA, EOMI, Anicteric, Trachea midline  Lungs: no wheezing/rales/rhonchi  Heart: RRR, +S1/s2, no rub  ABD: soft, nt/nd, +BS, no guarding/rebound  Extremities: Right AKA, bandage intact and clean/dry.  Neuro: AAOx3, no focal deficits  Psych: normal mood & affect        Results Review:  I have reviewed the labs, radiology results, and diagnostic studies.    Laboratory Data:   Results from last 7 days   Lab Units 06/28/25  0405 06/27/25  1535 06/26/25  0531   WBC 10*3/mm3 7.64 7.80 10.25   HEMOGLOBIN g/dL 8.3* 6.4* 8.8*   HEMATOCRIT % 25.0* 19.6* 26.9*   PLATELETS 10*3/mm3 117* 125* 118*        Results from last 7 days   Lab Units 06/28/25  0405 06/26/25  0531 06/25/25  0305 06/24/25  0420 06/23/25  0339   SODIUM mmol/L 133* 132* 135*   < > 135*   POTASSIUM mmol/L 4.4 4.1 4.4   < > 3.7   CHLORIDE mmol/L 98 99 104   < > 101   CO2 mmol/L 28.0 22.0 19.0*   < > 22.0   BUN mg/dL 23.6* 18.7 28.9*   < > 44.4*   CREATININE mg/dL 2.14* 1.98*  "2.89*   < > 3.81*   CALCIUM mg/dL 8.3* 8.6 8.6   < > 8.6   BILIRUBIN mg/dL  --  0.6 0.3  --  0.3   ALK PHOS U/L  --  149* 127*  --  144*   ALT (SGPT) U/L  --  <5 <5  --  <5   AST (SGOT) U/L  --  34 24  --  12   GLUCOSE mg/dL 108* 113* 89   < > 99    < > = values in this interval not displayed.       Culture Data:   No results found for: \"BLOODCX\", \"URINECX\", \"WOUNDCX\", \"MRSACX\", \"RESPCX\", \"STOOLCX\"    Radiology Data:   Imaging Results (Last 24 Hours)       ** No results found for the last 24 hours. **            I have reviewed the patient's current medications.     Assessment/Plan   Assessment  Active Hospital Problems    Diagnosis     **Vascular occlusion     ESRD (end stage renal disease) on dialysis     Chronic diastolic (congestive) heart failure     CLL (chronic lymphocytic leukemia)     Peripheral arterial disease     Essential hypertension        Treatment Plan  #1 vascular occlusion - with intervention on 6/22 right popliteal artery and peroneal artery.  Also had PTA to the right posterior tibial artery.  Was on heparin drip preprocedure. Post procedure on aspirin, Plavix, statin.  Unfortunately despite attempts at revascularization patient continued to have PAD with poor blood flow to his lower extremity.  He developed gangrenous changes of the right third toe.  As such was taken for AKA/24.  Vascular continues to follow.  They plan on reassessing wound with bandage takedown on Monday 6/30.  Patient likely okay to go back to facility at that point if stable.    #2 ESRD -continue HD as scheduled.  Nephrology following.     #3 chronic diastolic CHF -appears euvolemic.  No signs of exacerbation.    #4 hypertension -doing well currently on Coreg 12.5 twice daily, Isordil 10 3 times daily, nifedipine 120 XL nightly.  Other meds include Aldactone, valsartan, hydralazine on hold.  Clonidine was discontinued.  Likely does not need this full regimen at discharge.    #5 chronic constipation -continue bowel regimen.  " Positive BM    #6 anemia of chronic disease -monitor closely.  Received 2 uprbc yesterday and hgb from 6.4 up to 8.3.    Medical Decision Making  Number and Complexity of problems: 1 acute, multiple chronic  Differential Diagnosis: As above    Conditions and Status       Improving, not at goal     MDM Data  External documents reviewed: None  Cardiac tracing (EKG, telemetry) interpretation: None  Radiology interpretation: Reports reviewed  Labs reviewed: As above  Any tests that were considered but not ordered: None     Decision rules/scores evaluated (example [id number], Wells, etc): None     Discussed with: Patient, nursing, SW     Care Planning  Shared decision making: Patient apprised of current labs, vitals, imaging and treatment plan.  They are agreeable with proceeding with plans as discussed.    Code status and discussions: full code    Disposition  Social Determinants of Health that impact treatment or disposition: none    Continue postoperative management and monitoring.  Plan for dressing takedown on Monday 6/30.  Possible return to facility at that point.    Electronically signed by Dc Issa DO, 06/28/25, 13:51 CDT.

## 2025-06-28 NOTE — CONSULTS
"    [facility] Oncology/Hematology Services  CONSULT NOTE    PATIENT NAME:  Ricardo Hugo  YOB: 1950  PATIENT MRN:  [id number]    Date of Admission:  6/19/2025  Consultation Date:  6/29/2025  Referring Provider: Provider, No Known    Subjective     Chief complaint: Right foot pain, vascular occlusion    Reason for Consultation: Question about transfusion of least compatible blood products    History of present illness: Ricardo Hugo 75 y.o. male with a past medical history of coronary artery disease/CVA EEG, peripheral vascular disease, carotid stenosis, end-stage renal disease, COPD, who is being hospitalized for vascular occlusion after presenting with right foot pain.    He has history of peripheral vascular disease, followed by vascular surgery, and underwent multiple vascular procedures: \"He has previously undergone bilateral lower extremity arteriograms, right carotid endarterectomy, and a PermCath placement.  Most pertinent past surgical history is the right lower extremity arteriogram with PTA of the right external iliac artery, shockwave of the right SFA, and PTA of the right SFA.\"  His most recent right leg procedure took place on 4/25/2025, where pain had resolved up until about a week prior to presentation. He is s/p right AKA on 6/24/2025.     It is of note that he has history of end-stage renal disease on hemodialysis; history of CLL on observation, and history of anemia, currently receives erythropoietin stimulating agents per nephrology.    The patient's blood type is O negative, and as on Friday, 6/26/2025 his hemoglobin was 6.4 requiring PRBC transfusion, there were no available O negative blood to transfuse, but rather there was a positive, the question was to proceed with this transfusion or not.    Past Medical History:  Past Medical History:   Diagnosis Date    3-vessel CAD 08/11/2020    Allergic rhinitis     Anemia     Anxiety disorder 04/27/2020    Arthritis  "    Asymmetrical sensorineural hearing loss 06/28/2017    Atherosclerosis of native artery of both lower extremities with intermittent claudication 07/18/2019    Avascular necrosis of femoral head, left 07/11/2020    right hip after surgery    Carotid stenosis     Chronic mucoid otitis media     Chronic rhinitis     COPD (chronic obstructive pulmonary disease)     Coronary artery disease     HEART BYPASS 2004    Crohn's disease of large intestine with other complication 07/30/2020    Chronic diarrhea Colonoscopy July 2020 revealed mild patchy scattered hemosiderin staining with inflammation more so in rectosigmoid area.  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    Iron deficiency anemia 07/14/2020    Mixed hearing loss of left ear     PAD (peripheral artery disease) 08/11/2020    Perianal abscess     Pernicious anemia 08/17/2020    took shots but never diagnosed with b12 deficiency    Personal history of alcoholism 08/11/2020    quit drinking in 2013    Prostatic hypertrophy 08/11/2020    Sensorineural hearing loss     Sepsis with acute renal failure 09/15/2020    Shortness of breath 05/27/2021    Tinnitus     Ventricular tachycardia, nonsustained 07/14/2020    Weight loss 07/11/2020     Prior Surgeries:  Past Surgical History:   Procedure Laterality Date    ABOVE KNEE AMPUTATION Right 6/24/2025    Procedure: right above knee amputation;  Surgeon: Blayne Zabala MD;  Location: [facility] OR;  Service: Vascular;  Laterality: Right;    AORTOGRAM Right 4/25/2025    Procedure: RIGHT LOWER EXTREMITY ANGIOGRAM, SHOCKWAVE LITHOTRIPSY, BALLOON ANGIOPLASTY, MYNX CLOSURE;   Surgeon: Gil Pineda DO;  Location: [facility] HYBRID OR;  Service: Vascular;  Laterality: Right;    AORTOGRAM Left 5/22/2025    Procedure: LEFT LOWER EXTREMITY ANGIOGRAM, SHOCKWAVE LITHOTRIPSY, BALLOON ANGIOPLASTY, STENT PLACEMENT, MYNX CLOSURE;  Surgeon: Blayne Zabala MD;  Location: [facility] HYBRID OR;  Service: Vascular;  Laterality: Left;    AORTOGRAM Right 5/30/2025    Procedure: AORTOILIAC ANGIOGRAM WITH LEFT LOWER EXTREMITY ANGIOGRAM;  Surgeon: Gil Pineda DO;  Location: [facility] HYBRID OR;  Service: Vascular;  Laterality: Right;    AORTOGRAM Right 6/20/2025    Procedure: right lower extremity arteriogram, shockwave lithotripsy, balloon angioplasty, mynx closue;  Surgeon: Blayne Zabala MD;  Location: [facility] HYBRID OR;  Service: Vascular;  Laterality: Right;    ARTERY SURGERY  2021    right side on neck    CAROTID ENDARTERECTOMY Right 05/10/2021    Procedure: RIGHT CAROTID ENDARTERECTOMY WITH EEG;  Surgeon: Gil Pineda DO;  Location: [facility] HYBRID OR 12;  Service: Vascular;  Laterality: Right;    COLONOSCOPY N/A 07/02/2020    Procedure: COLONOSCOPY WITH ANESTHESIA;  Surgeon: Adrien Brewster MD;  Location: [facility] ENDOSCOPY;  Service: Gastroenterology;  Laterality: N/A;  pre op: diarrhea  post op: polyps  PCP: Joe Velasco MD    COLONOSCOPY N/A 10/13/2020    Procedure: COLONOSCOPY WITH ANESTHESIA;  Surgeon: Adrien Brewster MD;  Location: [facility] ENDOSCOPY;  Service: Gastroenterology;  Laterality: N/A;  Pre: Chronic Diarrhea, Crohn's  Post: AVM  Dr. Neftali Velasco  CO2 Inflation Used    COLONOSCOPY N/A 12/08/2023    Procedure: COLONOSCOPY WITH ANESTHESIA;  Surgeon: Adrien Brewster MD;  Location: [facility] ENDOSCOPY;  Service: Gastroenterology;  Laterality: N/A;  pre chrone's disease  post sub optimal prep, polyp, chrone's      CORONARY ARTERY BYPASS GRAFT  2003    x3    ENDOSCOPY N/A 11/02/2021    Procedure: ESOPHAGOGASTRODUODENOSCOPY WITH ANESTHESIA;  Surgeon: Ray  Bridger YUN MD;  Location: [facility] ENDOSCOPY;  Service: Gastroenterology;  Laterality: N/A;  pre anemia;gi bleed  post  gi bleed;fabian Velasco    ENDOSCOPY N/A 10/10/2023    Procedure: ESOPHAGOGASTRODUODENOSCOPY WITH ANESTHESIA;  Surgeon: Adrien Brewster MD;  Location: [facility] ENDOSCOPY;  Service: Gastroenterology;  Laterality: N/A;  preop; anemia  postop esophagitis ; R/O barretts   PCP Randall Beata    EYE SURGERY Bilateral     catorac    INCISION AND DRAINAGE PERIRECTAL ABSCESS N/A 03/03/2017    Procedure: INCISION AND DRAINAGE OF JEET ANAL ABSCESS;  Surgeon: Lynette Smith MD;  Location: [facility] OR;  Service:     INGUINAL HERNIA REPAIR Bilateral 06/27/2023    Procedure: INGUINAL HERNIA BILATERAL REPAIR LAPAROSCOPIC WITH DAVINCI ROBOT WITH MESH;  Surgeon: Tahira Rivera MD;  Location: [facility] OR;  Service: Robotics - DaVinci;  Laterality: Bilateral;    INSERTION HEMODIALYSIS CATHETER N/A 4/16/2025    Procedure: HEMODIALYSIS CATHETER PLACEMENT;  Surgeon: Gil Pineda DO;  Location: [facility] HYBRID OR;  Service: Vascular;  Laterality: N/A;    MYRINGOTOMY W/ TUBES Left 04/17/2017    06/10/2016    TONSILLECTOMY      TOTAL HIP ARTHROPLASTY Right 2006        Allergies:Ondansetron, Zofran [ondansetron hcl], Lortab [hydrocodone-acetaminophen], and Allopurinol  PTA Meds:  Medications Prior to Admission   Medication Sig Dispense Refill Last Dose/Taking    ALPRAZolam (XANAX) 0.25 MG tablet Take 1 tablet by mouth Every Night.   Taking    aspirin (aspirin) 81 MG EC tablet Take 1 tablet by mouth Daily.   Taking    atorvastatin (LIPITOR) 10 MG tablet Take 1 tablet by mouth Daily. 90 tablet 3 Taking    Budeson-Glycopyrrol-Formoterol (Breztri Aerosphere) 160-9-4.8 MCG/ACT aerosol inhaler Inhale 2 puffs 2 (Two) Times a Day.   Taking    calcitriol (ROCALTROL) 0.5 MCG capsule Take 1 capsule by mouth Daily. 90 capsule 2 Taking    carvedilol (COREG) 12.5 MG tablet Take 1 tablet by mouth 2 (Two) Times a Day With  Meals.   Taking    clopidogrel (PLAVIX) 75 MG tablet Take 1 tablet by mouth Daily.   Taking    docusate sodium (COLACE) 100 MG capsule Take 1 capsule by mouth 3 (Three) Times a Day As Needed for Constipation.   Taking As Needed    empagliflozin (Jardiance) 10 MG tablet tablet Take 1 tablet by mouth Daily.   Taking    esomeprazole (nexIUM) 20 MG capsule Take 1 capsule by mouth Every Morning Before Breakfast.   Taking    gabapentin (NEURONTIN) 300 MG capsule Take 1 capsule by mouth 2 (Two) Times a Day.   Taking    hydrALAZINE (APRESOLINE) 100 MG tablet Take 1 tablet by mouth 3 (Three) Times a Day.   Taking    iron polysaccharides (NIFEREX) 150 MG capsule Take 1 capsule by mouth Daily.   Taking    isosorbide dinitrate (ISORDIL) 10 MG tablet Take 1 tablet by mouth 3 (Three) Times a Day. 270 tablet 2 Taking    levothyroxine (Synthroid) 100 MCG tablet Take 1 tablet by mouth Every Morning. 90 tablet 2 Taking    magnesium chloride ER 64 MG DR tablet Take 1 tablet by mouth Daily.   Taking    montelukast (SINGULAIR) 10 MG tablet Take 1 tablet by mouth Every Night.   Taking    Multiple Vitamins-Minerals (PRESERVISION/LUTEIN) capsule Take 1 capsule by mouth 2 (two) times a day.   Taking    NIFEdipine XL (ADALAT CC) 60 MG 24 hr tablet Take 2 tablets by mouth Daily.   Taking    spironolactone (ALDACTONE) 25 MG tablet Take 1 tablet by mouth Daily.   Taking    tamsulosin (FLOMAX) 0.4 MG capsule 24 hr capsule Take 1 capsule by mouth Every Night.   Taking    valsartan (DIOVAN) 320 MG tablet Take 1 tablet by mouth Daily.   Taking    vitamin D (ERGOCALCIFEROL) 1.25 MG (14968 UT) capsule capsule Take 1 capsule by mouth 1 (One) Time Per Week. Mondays   Taking    albuterol sulfate  (90 Base) MCG/ACT inhaler Inhale 2 puffs Every 6 (Six) Hours As Needed for Wheezing or Shortness of Air.       bisacodyl (DULCOLAX) 10 MG suppository Insert 1 suppository into the rectum Daily As Needed for Constipation.       desoximetasone (TOPICORT)  0.25 % cream Apply 1 Application topically to the appropriate area as directed 2 (Two) Times a Day As Needed for Irritation. irritation 60 g 0     fluticasone (FLONASE) 50 MCG/ACT nasal spray Administer 2 sprays into the nostril(s) as directed by provider Daily As Needed for Allergies.       naloxone (NARCAN) 4 MG/0.1ML nasal spray Call 911. Don't prime. Collins in 1 nostril for overdose. Repeat in 2-3 minutes in other nostril if no or minimal breathing/responsiveness. 2 each 0     polyethylene glycol (MIRALAX) 17 g packet Take 17 g by mouth Daily As Needed (constipation).       senna 8.6 MG tablet Take 2 tablets by mouth Daily As Needed for Constipation.       [DISCONTINUED] cloNIDine (CATAPRES) 0.3 MG tablet Take 1 tablet by mouth 3 times a day. (Patient not taking: Reported on 6/21/2025)   Not Taking    [DISCONTINUED] gabapentin (NEURONTIN) 300 MG capsule Take 1 capsule by mouth Every Night. 24 capsule 0     [DISCONTINUED] omeprazole (priLOSEC) 20 MG capsule Take 1 capsule by mouth Daily. (Patient not taking: Reported on 6/21/2025)   Not Taking    [DISCONTINUED] oxyCODONE-acetaminophen (PERCOCET) 5-325 MG per tablet Take 1 tablet by mouth Every 6 (Six) Hours As Needed for Moderate Pain. 24 tablet 0       Current Meds:   Current Facility-Administered Medications   Medication Dose Route Frequency Provider Last Rate Last Admin    acetaminophen (TYLENOL) tablet 650 mg  650 mg Oral Q4H PRN Blayne Zabala MD   650 mg at 06/28/25 1601    Or    acetaminophen (TYLENOL) 160 MG/5ML oral solution 650 mg  650 mg Oral Q4H PRN Blayne Zabala MD        Or    acetaminophen (TYLENOL) suppository 650 mg  650 mg Rectal Q4H PRN Blayne Zabala MD        aspirin EC tablet 81 mg  81 mg Oral Daily Blayne Zabala MD   81 mg at 06/29/25 0900    atorvastatin (LIPITOR) tablet 10 mg  10 mg Oral Nightly Blayne Zabala MD   10 mg at 06/28/25 2129    sennosides-docusate (PERICOLACE) 8.6-50 MG per tablet 2 tablet  2 tablet Oral BID Vj  MD Blayne   2 tablet at 06/29/25 0900    And    polyethylene glycol (MIRALAX) packet 17 g  17 g Oral Daily PRN Blayne Zabala MD        And    bisacodyl (DULCOLAX) EC tablet 5 mg  5 mg Oral Daily PRN Blayne Zabala MD        And    bisacodyl (DULCOLAX) suppository 10 mg  10 mg Rectal Daily PRN Blayne Zabala MD        carvedilol (COREG) tablet 12.5 mg  12.5 mg Oral BID With Meals Jamie Pineda MD   12.5 mg at 06/29/25 0900    [Held by provider] clopidogrel (PLAVIX) tablet 75 mg  75 mg Oral Daily Blayne Zabala MD   75 mg at 06/27/25 1202    epoetin cecile-epbx (RETACRIT) injection 10,000 Units  10,000 Units Subcutaneous Once per day on Monday Wednesday Friday Blayne Zabala MD   10,000 Units at 06/27/25 1202    gabapentin (NEURONTIN) capsule 300 mg  300 mg Oral Nightly Blayne Zabala MD   300 mg at 06/28/25 2128    [Held by provider] hydrALAZINE (APRESOLINE) tablet 100 mg  100 mg Oral TID Blayne Zabala MD   100 mg at 06/21/25 1941    isosorbide dinitrate (ISORDIL) tablet 10 mg  10 mg Oral TID - Nitrates Blayne Zabala MD   10 mg at 06/29/25 0900    levothyroxine (SYNTHROID, LEVOTHROID) tablet 100 mcg  100 mcg Oral Q AM Blayne Zabala MD   100 mcg at 06/29/25 0531    montelukast (SINGULAIR) tablet 10 mg  10 mg Oral Nightly Blayne Zabala MD   10 mg at 06/28/25 2129    naloxone (NARCAN) injection 0.4 mg  0.4 mg Intravenous Q5 Min PRN Blayne Zabala MD        NIFEdipine XL (PROCARDIA XL) 24 hr tablet 120 mg  120 mg Oral Daily Blayne Zabala MD   120 mg at 06/27/25 1745    nitroglycerin (NITROSTAT) SL tablet 0.4 mg  0.4 mg Sublingual Q5 Min PRN Blayne Zabala MD        oxyCODONE-acetaminophen (PERCOCET)  MG per tablet 1 tablet  1 tablet Oral Q6H PRN Jamie Pineda MD   1 tablet at 06/29/25 0716    oxyCODONE-acetaminophen (PERCOCET) 5-325 MG per tablet 1 tablet  1 tablet Oral Q4H PRN Blayne Zabala MD   1 tablet at 06/29/25 0224    promethazine (PHENERGAN)  tablet 12.5 mg  12.5 mg Oral Q6H PRN Blayne Zabala MD   12.5 mg at 06/20/25 2243    sodium chloride 0.9 % flush 10 mL  10 mL Intravenous Q12H Blayne Zabala MD   10 mL at 06/29/25 0903    sodium chloride 0.9 % flush 10 mL  10 mL Intravenous PRN Blayne Zabala MD        sodium chloride 0.9 % infusion 40 mL  40 mL Intravenous PRN Blayne Zabala MD        [Held by provider] spironolactone (ALDACTONE) tablet 25 mg  25 mg Oral Daily Blayne Zabala MD   25 mg at 06/22/25 0905    tamsulosin (FLOMAX) 24 hr capsule 0.4 mg  0.4 mg Oral Nightly Blayne Zabala MD   0.4 mg at 06/28/25 2129    [Held by provider] valsartan (DIOVAN) tablet 320 mg  320 mg Oral Daily Blayne Zabala MD   320 mg at 06/22/25 0905     Family History:family history includes Breast cancer in his mother; Dementia in his father; Glaucoma in his father; No Known Problems in his daughter.   Social History: reports that he quit smoking about 11 years ago. His smoking use included cigarettes. He started smoking about 37 years ago. He has a 12.9 pack-year smoking history. He has been exposed to tobacco smoke. He has never used smokeless tobacco. He reports that he does not currently use alcohol. He reports that he does not use drugs.    Objective      Vital Signs   Temp:  [97.7 °F (36.5 °C)-99.1 °F (37.3 °C)] 97.7 °F (36.5 °C)  Heart Rate:  [69-83] 83  Resp:  [14-16] 16  BP: (124-180)/(40-75) 166/59  GENERAL: Alert and oriented, no apparent distress  HEENT: No scleral icterus  NECK: Supple  HEART: Regular rate and rhythm  LUNGS: Clear to auscultation bilaterally  ABDOMEN: Soft, nontender, nondistended  EXTREMITIES: Symmetric  SKIN: No jaundice  PSYCHIATRIC: Full affect  NEUROLOGIC: Stable gait    Results from last 7 days   Lab Units 06/29/25  0310 06/28/25  0405 06/27/25  1535   WBC 10*3/mm3 8.10 7.64 7.80   HEMOGLOBIN g/dL 8.8* 8.3* 6.4*   HEMATOCRIT % 27.0* 25.0* 19.6*   PLATELETS 10*3/mm3 127* 117* 125*        Results from last 7 days   Lab  Units 06/29/25  0310 06/28/25  0405 06/26/25  0531 06/25/25  0305 06/24/25  0420 06/23/25  0339   SODIUM mmol/L 135* 133* 132* 135*   < > 135*   POTASSIUM mmol/L 4.4 4.4 4.1 4.4   < > 3.7   CHLORIDE mmol/L 98 98 99 104   < > 101   CO2 mmol/L 26.0 28.0 22.0 19.0*   < > 22.0   BUN mg/dL 39.3* 23.6* 18.7 28.9*   < > 44.4*   CREATININE mg/dL 2.77* 2.14* 1.98* 2.89*   < > 3.81*   CALCIUM mg/dL 8.7 8.3* 8.6 8.6   < > 8.6   BILIRUBIN mg/dL  --   --  0.6 0.3  --  0.3   ALK PHOS U/L  --   --  149* 127*  --  144*   ALT (SGPT) U/L  --   --  <5 <5  --  <5   AST (SGOT) U/L  --   --  34 24  --  12   GLUCOSE mg/dL 97 108* 113* 89   < > 99    < > = values in this interval not displayed.      Assessment/Plan      ASSESSMENT:   Severe peripheral vascular disease, status post multiple vascular procedures, status post right AKA on 6/24/2025.  End-stage renal disease on hemodialysis  Anemia of CKD  Blood loss anemia  CLL  O negative blood type    PLAN:  - The patient has history of anemia, likely multifactorial, related to underlying anemia of CKD, anemia of chronic disease, and possibly blood loss anemia given the recent surgery.  - As for transfusion of O positive blood type for this patient with O negative blood type, I discussion took place with primary team and pathology, and O positive blood products were approved for the patient; risk of alloimmunization (anti-D formation) must be weighed against risk. At the point when this happened, the patient had a critical hemoglobin of 6.4, and O positive blood type was released compatible available, where benefit would outweigh risk at the moment.  - Reportedly, the hospital is receiving more O negative blood products today, which the patient could have transfused if needed.  - Continue management for other health issues, including peripheral vascular disease ESRD, per primary team and consulting services.    Lacy Moses MD  06/29/25

## 2025-06-28 NOTE — THERAPY TREATMENT NOTE
Acute Care - Physical Therapy Treatment Note  [facility]     Patient Name: Ricardo Hugo  : 1950  MRN: [id number]  Today's Date: 2025      Visit Dx:     ICD-10-CM ICD-9-CM   1. Vascular occlusion  I99.8 459.9   2. Arterial occlusion  I70.90 444.9   3. Dialysis patient  Z99.2 V45.11   4. Impaired mobility [Z74.09]  Z74.09 799.89   5. Severe protein-calorie malnutrition  E43 262   6. Thrombocytopenia  D69.6 287.5   7. Hyperkalemia  E87.5 276.7   8. Acute pain of left lower extremity  M79.605 729.5   9. Anemia, unspecified type  D64.9 285.9   10. Preop testing  Z01.818 V72.84   11. ESRD (end stage renal disease) on dialysis  N18.6 585.6    Z99.2 V45.11   12. Abnormal TSH  R79.89 790.6   13. History of pneumonia, current treatment with cefdinir  Z87.01 V12.61   14. Bradycardia  R00.1 427.89   15. Chronic diastolic (congestive) heart failure  I50.32 428.32     428.0   16. Persistent proteinuria  R80.1 791.0   17. Dermatitis associated with moisture  L30.8 692.89   18. Sleep difficulties  G47.9 780.50   19. Bilateral inguinal hernia without obstruction or gangrene, recurrence not specified  K40.20 550.92   20. Unilateral recurrent inguinal hernia without obstruction or gangrene  K40.91 550.91   21. Hypertrophy of inferior nasal turbinate  J34.3 478.0   22. Nasal septal deviation  J34.2 470   23. Anticoagulated  Z79.01 V58.61   24. Sensorineural hearing loss (SNHL), bilateral  H90.3 389.18   25. Eustachian tube dysfunction, bilateral  H69.93 381.81   26. Systolic murmur  R01.1 785.2   27. Mixed hyperlipidemia  E78.2 272.2   28. Perforation of left tympanic membrane  H72.92 384.20   29. CLL (chronic lymphocytic leukemia)  C91.10 204.10   30. Low iron   E61.1 280.9   31. Copper deficiency  E61.0 275.1   32. Anemia due to chronic kidney disease, on chronic dialysis  N18.6 585.6    D63.1 285.21    Z99.2 V45.11   33. CKD (chronic kidney disease) stage 5  N18.4 585.4   34. Diastolic dysfunction  I51.89 429.9    35. Carotid stenosis, right  I65.21 433.10   36. Stenosis of right carotid artery  I65.21 433.10   37. Bilateral carotid artery stenosis  I65.23 433.10     433.30   38. IHD (ischemic heart disease)  I25.9 414.9   39. Peripheral arterial disease  I73.9 443.9   40. Wellness examination  Z00.00 V70.0   41. Symptomatic anemia  D64.9 285.9   42. Chronic diarrhea  K52.9 787.91   43. Resistant hypertension  I1A.0 401.9   44. Essential hypertension  I10 401.9   45. Chronic rhinitis  J31.0 472.0     Patient Active Problem List   Diagnosis    Chronic rhinitis    Essential hypertension    Resistant hypertension    Chronic diarrhea    Symptomatic anemia    Wellness examination    Peripheral arterial disease    IHD (ischemic heart disease)    Carotid stenosis    Stenosis of right carotid artery    Carotid stenosis, right    Diastolic dysfunction    CKD (chronic kidney disease) stage 5    Anemia due to chronic kidney disease    Copper deficiency    Low iron     CLL (chronic lymphocytic leukemia)    Perforation of left tympanic membrane    Mixed hyperlipidemia    Systolic murmur    Eustachian tube dysfunction, bilateral    Sensorineural hearing loss (SNHL), bilateral    Anticoagulated    Nasal septal deviation    Hypertrophy of inferior nasal turbinate    Unilateral recurrent inguinal hernia without obstruction or gangrene    Bilateral inguinal hernia without obstruction or gangrene    Sleep difficulties    Dermatitis associated with moisture    Proteinuria    Chronic diastolic (congestive) heart failure    Bradycardia    History of pneumonia, current treatment with cefdinir    Abnormal TSH    ESRD (end stage renal disease) on dialysis    Preop testing    Anemia, multifactorial to include chronic kidney disease, iron deficiency and possible bleed    Acute pain of left lower extremity    Hyperkalemia    Arterial occlusion    Thrombocytopenia    Severe protein-calorie malnutrition    Chronic heart failure with preserved ejection  fraction (HFpEF)    Vascular occlusion     Past Medical History:   Diagnosis Date    3-vessel CAD 08/11/2020    Allergic rhinitis     Anemia     Anxiety disorder 04/27/2020    Arthritis     Asymmetrical sensorineural hearing loss 06/28/2017    Atherosclerosis of native artery of both lower extremities with intermittent claudication 07/18/2019    Avascular necrosis of femoral head, left 07/11/2020    right hip after surgery    Carotid stenosis     Chronic mucoid otitis media     Chronic rhinitis     COPD (chronic obstructive pulmonary disease)     Coronary artery disease     HEART BYPASS 2004    Crohn's disease of large intestine with other complication 07/30/2020    Chronic diarrhea Colonoscopy July 2020 revealed mild patchy scattered hemosiderin staining with inflammation more so in rectosigmoid area.  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    Iron deficiency anemia 07/14/2020    Mixed hearing loss of left ear     PAD (peripheral artery disease) 08/11/2020    Perianal abscess     Pernicious anemia 08/17/2020    took shots but never diagnosed with b12 deficiency    Personal history of alcoholism 08/11/2020    quit drinking in 2013    Prostatic hypertrophy 08/11/2020    Sensorineural hearing loss     Sepsis with acute renal failure 09/15/2020    Shortness of breath 05/27/2021    Tinnitus     Ventricular tachycardia, nonsustained 07/14/2020    Weight loss 07/11/2020     Past Surgical History:   Procedure Laterality Date    ABOVE KNEE AMPUTATION Right 6/24/2025    Procedure: right above knee amputation;  Surgeon: Blayne Zabala MD;  Location:  [facility] OR;   Service: Vascular;  Laterality: Right;    AORTOGRAM Right 4/25/2025    Procedure: RIGHT LOWER EXTREMITY ANGIOGRAM, SHOCKWAVE LITHOTRIPSY, BALLOON ANGIOPLASTY, MYNX CLOSURE;  Surgeon: Gil Pineda DO;  Location: [facility] HYBRID OR;  Service: Vascular;  Laterality: Right;    AORTOGRAM Left 5/22/2025    Procedure: LEFT LOWER EXTREMITY ANGIOGRAM, SHOCKWAVE LITHOTRIPSY, BALLOON ANGIOPLASTY, STENT PLACEMENT, MYNX CLOSURE;  Surgeon: Blayne Zabala MD;  Location: [facility] HYBRID OR;  Service: Vascular;  Laterality: Left;    AORTOGRAM Right 5/30/2025    Procedure: AORTOILIAC ANGIOGRAM WITH LEFT LOWER EXTREMITY ANGIOGRAM;  Surgeon: Gil Pineda DO;  Location: [facility] HYBRID OR;  Service: Vascular;  Laterality: Right;    AORTOGRAM Right 6/20/2025    Procedure: right lower extremity arteriogram, shockwave lithotripsy, balloon angioplasty, mynx closue;  Surgeon: Blayne Zabala MD;  Location: [facility] HYBRID OR;  Service: Vascular;  Laterality: Right;    ARTERY SURGERY  2021    right side on neck    CAROTID ENDARTERECTOMY Right 05/10/2021    Procedure: RIGHT CAROTID ENDARTERECTOMY WITH EEG;  Surgeon: Gil Pineda DO;  Location: [facility] HYBRID OR 12;  Service: Vascular;  Laterality: Right;    COLONOSCOPY N/A 07/02/2020    Procedure: COLONOSCOPY WITH ANESTHESIA;  Surgeon: Adrien Brewster MD;  Location: [facility] ENDOSCOPY;  Service: Gastroenterology;  Laterality: N/A;  pre op: diarrhea  post op: polyps  PCP: Joe Velasco MD    COLONOSCOPY N/A 10/13/2020    Procedure: COLONOSCOPY WITH ANESTHESIA;  Surgeon: Adrien Brewster MD;  Location: [facility] ENDOSCOPY;  Service: Gastroenterology;  Laterality: N/A;  Pre: Chronic Diarrhea, Crohn's  Post: AVM  Dr. Neftali Velasco  CO2 Inflation Used    COLONOSCOPY N/A 12/08/2023    Procedure: COLONOSCOPY WITH ANESTHESIA;  Surgeon: Adrien Brewster MD;  Location: [facility] ENDOSCOPY;  Service: Gastroenterology;  Laterality: N/A;  pre chrone's disease  post sub optimal prep, polyp,  jeffrey Murrell    CORONARY ARTERY BYPASS GRAFT  2003    x3    ENDOSCOPY N/A 11/02/2021    Procedure: ESOPHAGOGASTRODUODENOSCOPY WITH ANESTHESIA;  Surgeon: Bridger Bell MD;  Location: [facility] ENDOSCOPY;  Service: Gastroenterology;  Laterality: N/A;  pre anemia;gi bleed  post  gi bleed;schatski ring  Dr. ERIC Velasco    ENDOSCOPY N/A 10/10/2023    Procedure: ESOPHAGOGASTRODUODENOSCOPY WITH ANESTHESIA;  Surgeon: Adrien Brewster MD;  Location: [facility] ENDOSCOPY;  Service: Gastroenterology;  Laterality: N/A;  preop; anemia  postop esophagitis ; R/O barretts   PCP Randall Beata    EYE SURGERY Bilateral     catorac    INCISION AND DRAINAGE PERIRECTAL ABSCESS N/A 03/03/2017    Procedure: INCISION AND DRAINAGE OF JEET ANAL ABSCESS;  Surgeon: Lynette Smith MD;  Location: [facility] OR;  Service:     INGUINAL HERNIA REPAIR Bilateral 06/27/2023    Procedure: INGUINAL HERNIA BILATERAL REPAIR LAPAROSCOPIC WITH DAVINCI ROBOT WITH MESH;  Surgeon: Tahira Rivera MD;  Location: [facility] OR;  Service: Robotics - DaVinci;  Laterality: Bilateral;    INSERTION HEMODIALYSIS CATHETER N/A 4/16/2025    Procedure: HEMODIALYSIS CATHETER PLACEMENT;  Surgeon: Gil Pineda DO;  Location: [facility] HYBRID OR;  Service: Vascular;  Laterality: N/A;    MYRINGOTOMY W/ TUBES Left 04/17/2017    06/10/2016    TONSILLECTOMY      TOTAL HIP ARTHROPLASTY Right 2006     PT Assessment (Last 12 Hours)       PT Evaluation and Treatment       Row Name 06/28/25 1533          Physical Therapy Time and Intention    Subjective Information complains of;weakness;fatigue;pain  -AH     Document Type therapy note (daily note)  -     Mode of Treatment physical therapy  -       Row Name 06/28/25 1533          General Information    Existing Precautions/Restrictions fall  R AKA  -       Row Name 06/28/25 1533          Pain    Pretreatment Pain Rating 2/10  -     Posttreatment Pain Rating 4/10  -     Pain Location residual limb  -     Pain  Side/Orientation right  -     Pain Management Interventions positioning techniques utilized  -     Response to Pain Interventions activity participation with increased pain  -       Row Name 06/28/25 1533          Bed Mobility    Bed Mobility scooting/bridging  -     Scooting/Bridging [place] (Bed Mobility) dependent (less than 25% patient effort)  -     Supine-Sit [place] (Bed Mobility) minimum assist (75% patient effort);moderate assist (50% patient effort);verbal cues  -     Sit-Supine [place] (Bed Mobility) minimum assist (75% patient effort);moderate assist (50% patient effort);verbal cues  -     Assistive Device (Bed Mobility) repositioning sheet;head of bed elevated;bed rails  -     Comment, (Bed Mobility) sat EOB 15 minutes cga-min  -       Row Name 06/28/25 1533          Motor Skills    Comments, Therapeutic Exercise LLE AROM 15 x 2 reps  -     Additional Documentation Comments, Therapeutic Exercise (Row)  -       Row Name             Residual Limb Assessment 06/24/25 1900 transfemoral (above knee), right    Residual Limb Assessment - Properties Group Placement Date: 06/24/25  - Placement Time: 1900  -JR Amputation Date: 06/24/25  - Location: transfemoral (above knee), right  -JR    Retired Residual Limb Assessment - Properties Group Placement Date: 06/24/25  - Placement Time: 1900  -JR Amputation Date: 06/24/25  - Location: transfemoral (above knee), right  -JR    Retired Residual Limb Assessment - Properties Group Placement Date: 06/24/25  - Placement Time: 1900  -JR Amputation Date: 06/24/25  - Location: transfemoral (above knee), right  -JR    Retired Residual Limb Assessment - Properties Group Date first assessed: 06/24/25  - Time first assessed: 1900  -JR Amputation Date: 06/24/25  - Location: transfemoral (above knee), right  -JR      Row Name             Wound 05/21/25 2311 Bilateral sacral spine     Wound - Properties Group Placement Date:  05/21/25  -SB Placement Time: 2311  -SB Present on Original Admission: Y  -SB Side: Bilateral  -SB Location: sacral spine  -SB Primary Wound Type: --  -SB, nonblanchable discoloration     Retired Wound - Properties Group Placement Date: 05/21/25  -SB Placement Time: 2311 -SB Present on Original Admission: Y  -SB Side: Bilateral  -SB Location: sacral spine  -SB    Retired Wound - Properties Group Placement Date: 05/21/25  -SB Placement Time: 2311 -SB Present on Original Admission: Y  -SB Side: Bilateral  -SB Location: sacral spine  -SB    Retired Wound - Properties Group Date first assessed: 05/21/25  -SB Time first assessed: 2311  -SB Present on Original Admission: Y  -SB Side: Bilateral  -SB Location: sacral spine  -SB      Row Name             Wound 06/19/25 2024 Left anterior plantar    Wound - Properties Group Placement Date: 06/19/25  -BR Placement Time: 2024 -BR Side: Left  -BR Orientation: anterior  -BR Location: plantar  -BR    Retired Wound - Properties Group Placement Date: 06/19/25  -BR Placement Time: 2024 -BR Side: Left  -BR Orientation: anterior  -BR Location: plantar  -BR    Retired Wound - Properties Group Placement Date: 06/19/25  -BR Placement Time: 2024 -BR Side: Left  -BR Orientation: anterior  -BR Location: plantar  -BR    Retired Wound - Properties Group Date first assessed: 06/19/25  -BR Time first assessed: 2024  -BR Side: Left  -BR Location: plantar  -BR      Row Name             Wound 06/20/25 1656 Left anterior groin Surgical Closed Surgical Incision    Wound - Properties Group Placement Date: 06/20/25  -AC Placement Time: 1656 -AC Present on Original Admission: N  -AC Side: Left  -AC Orientation: anterior  -AC Location: groin  -AC Primary Wound Type: Surgical  -AC Secondary Wound Type - Surgical: Closed Surgi  -AC Additional Comments: mynx 2x2 tegaderm  -AC    Retired Wound - Properties Group Placement Date: 06/20/25  -AC Placement Time: 1656  -AC Present on Original Admission: N   -AC Side: Left  -AC Orientation: anterior  -AC Location: groin  -AC Additional Comments: mynx 2x2 tegaderm  -AC    Retired Wound - Properties Group Placement Date: 06/20/25  -AC Placement Time: 1656  -AC Present on Original Admission: N  -AC Side: Left  -AC Orientation: anterior  -AC Location: groin  -AC Additional Comments: mynx 2x2 tegaderm  -AC    Retired Wound - Properties Group Date first assessed: 06/20/25  -AC Time first assessed: 1656  -AC Present on Original Admission: N  -AC Side: Left  -AC Location: groin  -AC Additional Comments: mynx 2x2 tegaderm  -AC      Row Name             Wound 06/24/25 1258 Right distal thigh Surgical Closed Surgical Incision    Wound - Properties Group Placement Date: 06/24/25  -CB Placement Time: 1258  -CB Present on Original Admission: N  -CB Side: Right  -CB Orientation: distal  -CB Location: thigh  -CB Primary Wound Type: Surgical  -CB Secondary Wound Type - Surgical: Closed Surgi  -CB    Retired Wound - Properties Group Placement Date: 06/24/25  -CB Placement Time: 1258  -CB Present on Original Admission: N  -CB Side: Right  -CB Orientation: distal  -CB Location: thigh  -CB    Retired Wound - Properties Group Placement Date: 06/24/25  -CB Placement Time: 1258  -CB Present on Original Admission: N  -CB Side: Right  -CB Orientation: distal  -CB Location: thigh  -CB    Retired Wound - Properties Group Date first assessed: 06/24/25  -CB Time first assessed: 1258  -CB Present on Original Admission: N  -CB Side: Right  -CB Location: thigh  -CB      Row Name 06/28/25 1533          Positioning and Restraints    Pre-Treatment Position in bed  -AH     Post Treatment Position bed  -AH     In Bed fowlers;call light within reach;encouraged to call for assist;exit alarm on;with family/caregiver  -               User Key  (r) = Recorded By, (t) = Taken By, (c) = Cosigned By      Initials Name Provider Type    Arti Davis, PTA Physical Therapist Assistant    Emilia Gross,  RN Registered Nurse    Jeni Nathan, RN Registered Nurse    Francisco Lieberman, RN Registered Nurse    Dorian Cote, RN Registered Nurse    Doug Brito, RN Registered Nurse                    Physical Therapy Education       Title: PT OT SLP Therapies (Done)       Topic: Physical Therapy (Done)       Point: Mobility training (Done)       Learning Progress Summary            Patient Acceptance, E, NR,VU by AD at 6/26/2025 1436    Comment: POC, d/c plans, body mechanics, positioning techniques   Family Acceptance, E, NR,VU by AD at 6/26/2025 1436    Comment: POC, d/c plans, body mechanics, positioning techniques                      Point: Home exercise program (Done)       Learning Progress Summary            Patient Acceptance, E, NR,VU by AD at 6/26/2025 1436    Comment: POC, d/c plans, body mechanics, positioning techniques   Family Acceptance, E, NR,VU by AD at 6/26/2025 1436    Comment: POC, d/c plans, body mechanics, positioning techniques                      Point: Body mechanics (Done)       Learning Progress Summary            Patient Acceptance, E, NR,VU by AD at 6/26/2025 1436    Comment: POC, d/c plans, body mechanics, positioning techniques   Family Acceptance, E, NR,VU by AD at 6/26/2025 1436    Comment: POC, d/c plans, body mechanics, positioning techniques                      Point: Precautions (Done)       Learning Progress Summary            Patient Acceptance, E, NR,VU by AD at 6/26/2025 1436    Comment: POC, d/c plans, body mechanics, positioning techniques   Family Acceptance, E, NR,VU by AD at 6/26/2025 1436    Comment: POC, d/c plans, body mechanics, positioning techniques                                      User Key       Initials Effective Dates Name Provider Type Discipline    AD 04/21/25 -  Yon Magaña, PT Student PT Student PT                  PT Recommendation and Plan         Outcome Measures       Row Name 06/28/25 1500             How much help from another  person do you currently need...    Turning from your back to your side while in flat bed without using bedrails? 3  -AH      Moving from lying on back to sitting on the side of a flat bed without bedrails? 3  -AH      Moving to and from a bed to a chair (including a wheelchair)? 1  -AH      Standing up from a chair using your arms (e.g., wheelchair, bedside chair)? 1  -AH      Climbing 3-5 steps with a railing? 1  -AH      To walk in hospital room? 1  -AH      AM-PAC 6 Clicks Score (PT) 10  -         Functional Assessment    Outcome Measure Options AM-PAC 6 Clicks Basic Mobility (PT)  -                User Key  (r) = Recorded By, (t) = Taken By, (c) = Cosigned By      Initials Name Provider Type    Arti Davis PTA Physical Therapist Assistant                     Time Calculation:    PT Charges       Row Name 06/28/25 1556             Time Calculation    Start Time 1533  -      Stop Time 1556  -      Time Calculation (min) 23 min  -      PT Received On 06/28/25  -         Time Calculation- PT    Total Timed Code Minutes- PT 23 minute(s)  -         Timed Charges    85642 - PT Therapeutic Activity Minutes 23  -AH         Total Minutes    Timed Charges Total Minutes 23  -AH       Total Minutes 23  -AH                User Key  (r) = Recorded By, (t) = Taken By, (c) = Cosigned By      Initials Name Provider Type    Arti Davis PTA Physical Therapist Assistant                  Therapy Charges for Today       Code Description Service Date Service Provider Modifiers Qty    [id number]  PT THERAPEUTIC ACT EA 15 MIN 6/28/2025 Arti Quinonez PTA GP 2            PT G-Codes  Outcome Measure Options: AM-PAC 6 Clicks Basic Mobility (PT)  AM-PAC 6 Clicks Score (PT): 10    Arti Quinonez PTA  6/28/2025

## 2025-06-28 NOTE — PROGRESS NOTES
Nephrology ([facility]) Progress Note      Patient:  Ricardo Hugo  YOB: 1950  Date of Service: 6/28/2025  MRN: [id number]   Acct: [id number]   Primary Care Physician: Nathan Zimmer MD  Advance Directive:   Code Status and Medical Interventions: CPR (Attempt to Resuscitate); Full Support   Ordered at: 06/19/25 1944     Code Status (Patient has no pulse and is not breathing):    CPR (Attempt to Resuscitate)     Medical Interventions (Patient has pulse or is breathing):    Full Support     Admit Date: 6/19/2025       Hospital Day: 9  Referring Provider: No Known Provider      Patient personally seen and examined.  Complete chart including Consults, Notes, Operative Reports, Labs, Cardiology, and Radiology studies reviewed as able.        Subjective:  Ricardo Hugo is a 75 y.o. male for whom we were consulted for evaluation and treatment of end-stage renal disease on maintenance dialysis. Patient has permacatheter as a dialysis access. He also has history of peripheral vascular disease, right carotid endarterectomy, PTA of the right external iliac artery, right SFA. Presenting to the emergency room complaining of right leg pain. His pain has progressively getting worse and has decided come to the emergency room. Patient is currently being evaluated by Dr. Zabala for another arteriogram, look at the status of his blood flow to the right leg. On June 28 patient underwent intravascular lithotripsy of popliteal artery, posterior tibial artery followed by balloon angioplasties. Tolerating HD treatments well. Underwent right AKA on 6/24. Tolerating HD well during the admission.    Today is awake and alert. No new complaints. No new overnight issues.  He is status post dialysis on June 27.  Patient has been bleeding from his surgical site.  He dropped his hemoglobin and has required 2 units of PRBCs.  We provided desmopressin.  We asked him to stop using a heating blanket.   Today, patient has not been bleeding and his hemoglobin has been stable.    Allergies:  Ondansetron, Zofran [ondansetron hcl], Lortab [hydrocodone-acetaminophen], and Allopurinol    Home Meds:  Medications Prior to Admission   Medication Sig Dispense Refill Last Dose/Taking    ALPRAZolam (XANAX) 0.25 MG tablet Take 1 tablet by mouth Every Night.   Taking    aspirin (aspirin) 81 MG EC tablet Take 1 tablet by mouth Daily.   Taking    atorvastatin (LIPITOR) 10 MG tablet Take 1 tablet by mouth Daily. 90 tablet 3 Taking    Budeson-Glycopyrrol-Formoterol (Breztri Aerosphere) 160-9-4.8 MCG/ACT aerosol inhaler Inhale 2 puffs 2 (Two) Times a Day.   Taking    calcitriol (ROCALTROL) 0.5 MCG capsule Take 1 capsule by mouth Daily. 90 capsule 2 Taking    carvedilol (COREG) 12.5 MG tablet Take 1 tablet by mouth 2 (Two) Times a Day With Meals.   Taking    clopidogrel (PLAVIX) 75 MG tablet Take 1 tablet by mouth Daily.   Taking    docusate sodium (COLACE) 100 MG capsule Take 1 capsule by mouth 3 (Three) Times a Day As Needed for Constipation.   Taking As Needed    empagliflozin (Jardiance) 10 MG tablet tablet Take 1 tablet by mouth Daily.   Taking    esomeprazole (nexIUM) 20 MG capsule Take 1 capsule by mouth Every Morning Before Breakfast.   Taking    gabapentin (NEURONTIN) 300 MG capsule Take 1 capsule by mouth 2 (Two) Times a Day.   Taking    hydrALAZINE (APRESOLINE) 100 MG tablet Take 1 tablet by mouth 3 (Three) Times a Day.   Taking    iron polysaccharides (NIFEREX) 150 MG capsule Take 1 capsule by mouth Daily.   Taking    isosorbide dinitrate (ISORDIL) 10 MG tablet Take 1 tablet by mouth 3 (Three) Times a Day. 270 tablet 2 Taking    levothyroxine (Synthroid) 100 MCG tablet Take 1 tablet by mouth Every Morning. 90 tablet 2 Taking    magnesium chloride ER 64 MG DR tablet Take 1 tablet by mouth Daily.   Taking    montelukast (SINGULAIR) 10 MG tablet Take 1 tablet by mouth Every Night.   Taking    Multiple Vitamins-Minerals  (PRESERVISION/LUTEIN) capsule Take 1 capsule by mouth 2 (two) times a day.   Taking    NIFEdipine XL (ADALAT CC) 60 MG 24 hr tablet Take 2 tablets by mouth Daily.   Taking    spironolactone (ALDACTONE) 25 MG tablet Take 1 tablet by mouth Daily.   Taking    tamsulosin (FLOMAX) 0.4 MG capsule 24 hr capsule Take 1 capsule by mouth Every Night.   Taking    valsartan (DIOVAN) 320 MG tablet Take 1 tablet by mouth Daily.   Taking    vitamin D (ERGOCALCIFEROL) 1.25 MG (60322 UT) capsule capsule Take 1 capsule by mouth 1 (One) Time Per Week. Mondays   Taking    albuterol sulfate  (90 Base) MCG/ACT inhaler Inhale 2 puffs Every 6 (Six) Hours As Needed for Wheezing or Shortness of Air.       bisacodyl (DULCOLAX) 10 MG suppository Insert 1 suppository into the rectum Daily As Needed for Constipation.       desoximetasone (TOPICORT) 0.25 % cream Apply 1 Application topically to the appropriate area as directed 2 (Two) Times a Day As Needed for Irritation. irritation 60 g 0     fluticasone (FLONASE) 50 MCG/ACT nasal spray Administer 2 sprays into the nostril(s) as directed by provider Daily As Needed for Allergies.       naloxone (NARCAN) 4 MG/0.1ML nasal spray Call 911. Don't prime. [place] in 1 nostril for overdose. Repeat in 2-3 minutes in other nostril if no or minimal breathing/responsiveness. 2 each 0     polyethylene glycol (MIRALAX) 17 g packet Take 17 g by mouth Daily As Needed (constipation).       senna 8.6 MG tablet Take 2 tablets by mouth Daily As Needed for Constipation.       [DISCONTINUED] cloNIDine (CATAPRES) 0.3 MG tablet Take 1 tablet by mouth 3 times a day. (Patient not taking: Reported on 6/21/2025)   Not Taking    [DISCONTINUED] gabapentin (NEURONTIN) 300 MG capsule Take 1 capsule by mouth Every Night. 24 capsule 0     [DISCONTINUED] omeprazole (priLOSEC) 20 MG capsule Take 1 capsule by mouth Daily. (Patient not taking: Reported on 6/21/2025)   Not Taking    [DISCONTINUED] oxyCODONE-acetaminophen  (PERCOCET) 5-325 MG per tablet Take 1 tablet by mouth Every 6 (Six) Hours As Needed for Moderate Pain. 24 tablet 0        Medicines:  Current Facility-Administered Medications   Medication Dose Route Frequency Provider Last Rate Last Admin    acetaminophen (TYLENOL) tablet 650 mg  650 mg Oral Q4H PRN Blayne Zabala MD   650 mg at 06/23/25 1842    Or    acetaminophen (TYLENOL) 160 MG/5ML oral solution 650 mg  650 mg Oral Q4H PRN Blayne Zabala MD        Or    acetaminophen (TYLENOL) suppository 650 mg  650 mg Rectal Q4H PRN Blayne Zabala MD        aspirin EC tablet 81 mg  81 mg Oral Daily Blayne Zabala MD   81 mg at 06/28/25 0832    atorvastatin (LIPITOR) tablet 10 mg  10 mg Oral Nightly Blayne Zabala MD   10 mg at 06/27/25 2024    sennosides-docusate (PERICOLACE) 8.6-50 MG per tablet 2 tablet  2 tablet Oral BID Blayne Zabala MD   2 tablet at 06/28/25 0825    And    polyethylene glycol (MIRALAX) packet 17 g  17 g Oral Daily PRN Blayne Zabala MD        And    bisacodyl (DULCOLAX) EC tablet 5 mg  5 mg Oral Daily PRN Blayne Zabala MD        And    bisacodyl (DULCOLAX) suppository 10 mg  10 mg Rectal Daily PRN Blayne Zabala MD        carvedilol (COREG) tablet 12.5 mg  12.5 mg Oral BID With Meals Jamie Pineda MD   12.5 mg at 06/28/25 0826    [Held by provider] clopidogrel (PLAVIX) tablet 75 mg  75 mg Oral Daily Blayne Zabala MD   75 mg at 06/27/25 1202    epoetin cecile-epbx (RETACRIT) injection 10,000 Units  10,000 Units Subcutaneous Once per day on Monday Wednesday Friday Blayne Zabala MD   10,000 Units at 06/27/25 1202    gabapentin (NEURONTIN) capsule 300 mg  300 mg Oral Nightly Blayne Zabala MD   300 mg at 06/27/25 2024    [Held by provider] hydrALAZINE (APRESOLINE) tablet 100 mg  100 mg Oral TID Blayne Zabala MD   100 mg at 06/21/25 1941    isosorbide dinitrate (ISORDIL) tablet 10 mg  10 mg Oral TID - Nitrates VjBlayne bernard MD   10 mg at 06/28/25 1241     levothyroxine (SYNTHROID, LEVOTHROID) tablet 100 mcg  100 mcg Oral Q AM Blayne Zabala MD   100 mcg at 06/28/25 0623    montelukast (SINGULAIR) tablet 10 mg  10 mg Oral Nightly Blayne Zabala MD   10 mg at 06/27/25 2024    naloxone (NARCAN) injection 0.4 mg  0.4 mg Intravenous Q5 Min PRN Blayne Zabala MD        NIFEdipine XL (PROCARDIA XL) 24 hr tablet 120 mg  120 mg Oral Daily Blayne Zabala MD   120 mg at 06/27/25 1745    nitroglycerin (NITROSTAT) SL tablet 0.4 mg  0.4 mg Sublingual Q5 Min PRN Blayne Zabala MD        oxyCODONE-acetaminophen (PERCOCET)  MG per tablet 1 tablet  1 tablet Oral Q6H PRN Jamie Pineda MD   1 tablet at 06/28/25 1320    oxyCODONE-acetaminophen (PERCOCET) 5-325 MG per tablet 1 tablet  1 tablet Oral Q4H PRN Blayne Zabala MD   1 tablet at 06/27/25 1202    promethazine (PHENERGAN) tablet 12.5 mg  12.5 mg Oral Q6H PRN Blayne Zabala MD   12.5 mg at 06/20/25 2243    sodium chloride 0.9 % flush 10 mL  10 mL Intravenous Q12H Blayne Zabala MD   10 mL at 06/27/25 2024    sodium chloride 0.9 % flush 10 mL  10 mL Intravenous PRN Blayne Zabala MD        sodium chloride 0.9 % infusion 40 mL  40 mL Intravenous PRN Blayne Zabala MD        [Held by provider] spironolactone (ALDACTONE) tablet 25 mg  25 mg Oral Daily Blayne Zabala MD   25 mg at 06/22/25 0905    tamsulosin (FLOMAX) 24 hr capsule 0.4 mg  0.4 mg Oral Nightly Blayne Zabala MD   0.4 mg at 06/27/25 2024    [Held by provider] valsartan (DIOVAN) tablet 320 mg  320 mg Oral Daily Blayne Zabala MD   320 mg at 06/22/25 0905       Past Medical History:  Past Medical History:   Diagnosis Date    3-vessel CAD 08/11/2020    Allergic rhinitis     Anemia     Anxiety disorder 04/27/2020    Arthritis     Asymmetrical sensorineural hearing loss 06/28/2017    Atherosclerosis of native artery of both lower extremities with intermittent claudication 07/18/2019    Avascular necrosis of femoral head, left  07/11/2020    right hip after surgery    Carotid stenosis     Chronic mucoid otitis media     Chronic rhinitis     COPD (chronic obstructive pulmonary disease)     Coronary artery disease     HEART BYPASS 2004    Crohn's disease of large intestine with other complication 07/30/2020    Chronic diarrhea Colonoscopy July 2020 revealed mild patchy scattered hemosiderin staining with inflammation more so in rectosigmoid area.  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    Iron deficiency anemia 07/14/2020    Mixed hearing loss of left ear     PAD (peripheral artery disease) 08/11/2020    Perianal abscess     Pernicious anemia 08/17/2020    took shots but never diagnosed with b12 deficiency    Personal history of alcoholism 08/11/2020    quit drinking in 2013    Prostatic hypertrophy 08/11/2020    Sensorineural hearing loss     Sepsis with acute renal failure 09/15/2020    Shortness of breath 05/27/2021    Tinnitus     Ventricular tachycardia, nonsustained 07/14/2020    Weight loss 07/11/2020       Past Surgical History:  Past Surgical History:   Procedure Laterality Date    ABOVE KNEE AMPUTATION Right 6/24/2025    Procedure: right above knee amputation;  Surgeon: Blayne Zabala MD;  Location: [facility] OR;  Service: Vascular;  Laterality: Right;    AORTOGRAM Right 4/25/2025    Procedure: RIGHT LOWER EXTREMITY ANGIOGRAM, SHOCKWAVE LITHOTRIPSY, BALLOON ANGIOPLASTY, MYNX CLOSURE;  Surgeon: Gil Pineda DO;  Location:  [facility] HYBRID OR;  Service: Vascular;  Laterality: Right;    AORTOGRAM Left 5/22/2025    Procedure: LEFT LOWER EXTREMITY ANGIOGRAM, SHOCKWAVE  LITHOTRIPSY, BALLOON ANGIOPLASTY, STENT PLACEMENT, MYNX CLOSURE;  Surgeon: Blayne Zabala MD;  Location: [facility] HYBRID OR;  Service: Vascular;  Laterality: Left;    AORTOGRAM Right 5/30/2025    Procedure: AORTOILIAC ANGIOGRAM WITH LEFT LOWER EXTREMITY ANGIOGRAM;  Surgeon: Gil Pineda DO;  Location: [facility] HYBRID OR;  Service: Vascular;  Laterality: Right;    AORTOGRAM Right 6/20/2025    Procedure: right lower extremity arteriogram, shockwave lithotripsy, balloon angioplasty, mynx closue;  Surgeon: Blayne Zabala MD;  Location: [facility] HYBRID OR;  Service: Vascular;  Laterality: Right;    ARTERY SURGERY  2021    right side on neck    CAROTID ENDARTERECTOMY Right 05/10/2021    Procedure: RIGHT CAROTID ENDARTERECTOMY WITH EEG;  Surgeon: Gil Pineda DO;  Location: [facility] HYBRID OR 12;  Service: Vascular;  Laterality: Right;    COLONOSCOPY N/A 07/02/2020    Procedure: COLONOSCOPY WITH ANESTHESIA;  Surgeon: Adrien Brewster MD;  Location: [facility] ENDOSCOPY;  Service: Gastroenterology;  Laterality: N/A;  pre op: diarrhea  post op: polyps  PCP: Joe Velasco MD    COLONOSCOPY N/A 10/13/2020    Procedure: COLONOSCOPY WITH ANESTHESIA;  Surgeon: Adrien Brewster MD;  Location: [facility] ENDOSCOPY;  Service: Gastroenterology;  Laterality: N/A;  Pre: Chronic Diarrhea, Crohn's  Post: AVM  Dr. Neftali Velasco  CO2 Inflation Used    COLONOSCOPY N/A 12/08/2023    Procedure: COLONOSCOPY WITH ANESTHESIA;  Surgeon: Adrien Brewster MD;  Location: [facility] ENDOSCOPY;  Service: Gastroenterology;  Laterality: N/A;  pre chrone's disease  post sub optimal prep, polyp, chrone's      CORONARY ARTERY BYPASS GRAFT  2003    x3    ENDOSCOPY N/A 11/02/2021    Procedure: ESOPHAGOGASTRODUODENOSCOPY WITH ANESTHESIA;  Surgeon: Bridger Bell MD;  Location: [facility] ENDOSCOPY;  Service: Gastroenterology;  Laterality: N/A;  pre anemia;gi bleed  post  gi bleed;schatski ring  Dr. ERIC Velasco    ENDOSCOPY N/A 10/10/2023     Procedure: ESOPHAGOGASTRODUODENOSCOPY WITH ANESTHESIA;  Surgeon: Adrien Brewster MD;  Location: [facility] ENDOSCOPY;  Service: Gastroenterology;  Laterality: N/A;  preop; anemia  postop esophagitis ; R/O barretts   PCP Randall Beata    EYE SURGERY Bilateral     catorac    INCISION AND DRAINAGE PERIRECTAL ABSCESS N/A 2017    Procedure: INCISION AND DRAINAGE OF JEET ANAL ABSCESS;  Surgeon: Lynette Smith MD;  Location: [facility] OR;  Service:     INGUINAL HERNIA REPAIR Bilateral 2023    Procedure: INGUINAL HERNIA BILATERAL REPAIR LAPAROSCOPIC WITH DAVINCI ROBOT WITH MESH;  Surgeon: Tahira Rivera MD;  Location: [facility] OR;  Service: Robotics - DaVinci;  Laterality: Bilateral;    INSERTION HEMODIALYSIS CATHETER N/A 2025    Procedure: HEMODIALYSIS CATHETER PLACEMENT;  Surgeon: Gil Pineda DO;  Location: [facility] HYBRID OR;  Service: Vascular;  Laterality: N/A;    MYRINGOTOMY W/ TUBES Left 2017    06/10/2016    TONSILLECTOMY      TOTAL HIP ARTHROPLASTY Right        Family History  Family History   Problem Relation Age of Onset    Breast cancer Mother     Dementia Father     Glaucoma Father     No Known Problems Daughter     Colon polyps Neg Hx     Colon cancer Neg Hx        Social History  Social History     Socioeconomic History    Marital status:    Tobacco Use    Smoking status: Former     Current packs/day: 0.00     Average packs/day: 0.5 packs/day for 25.8 years (12.9 ttl pk-yrs)     Types: Cigarettes     Start date:      Quit date: 10/13/2013     Years since quittin.7     Passive exposure: Past    Smokeless tobacco: Never    Tobacco comments:     quit    Vaping Use    Vaping status: Former    Substances: Nicotine    Devices: Pre-filled or refillable cartridge   Substance and Sexual Activity    Alcohol use: Not Currently    Drug use: No    Sexual activity: Not Currently     Partners: Female       Review of Systems:  History obtained from chart review and the  patient  General ROS: No fever or chills  Respiratory ROS: No cough, shortness of breath, wheezing  Cardiovascular ROS: No chest pain or palpitations  Gastrointestinal ROS: No abdominal pain or melena  Genito-Urinary ROS: No dysuria or hematuria  Psych ROS: No anxiety and depression  14 point ROS reviewed with the patient and negative except as noted above and in the HPI unless unable to obtain.    Objective:  Patient Vitals for the past 24 hrs:   BP Temp Temp src Pulse Resp SpO2 Weight   06/28/25 1125 134/40 97.8 °F (36.6 °C) Axillary 69 14 98 % --   06/28/25 0755 131/50 97.7 °F (36.5 °C) Oral 67 16 98 % --   06/28/25 0600 -- -- -- -- -- -- 39.9 kg (88 lb)   06/28/25 0318 159/88 98.8 °F (37.1 °C) Oral 88 16 95 % --   06/27/25 2351 143/53 99.2 °F (37.3 °C) Oral 78 16 96 % --   06/27/25 2336 150/47 98.9 °F (37.2 °C) Oral 78 18 96 % --   06/27/25 2230 146/57 98.9 °F (37.2 °C) Oral 75 16 98 % --   06/27/25 2025 150/53 99.4 °F (37.4 °C) Oral 79 16 100 % --   06/27/25 1912 134/48 98.7 °F (37.1 °C) Oral 84 16 99 % --   06/27/25 1901 130/52 98.7 °F (37.1 °C) -- 82 16 99 % --   06/27/25 1842 108/43 98.2 °F (36.8 °C) Oral 83 16 98 % --   06/27/25 1618 -- -- -- 80 16 97 % --   06/27/25 1604 130/50 -- -- 80 -- -- --       Intake/Output Summary (Last 24 hours) at 6/28/2025 1357  Last data filed at 6/28/2025 0318  Gross per 24 hour   Intake 770 ml   Output --   Net 770 ml       General: awake/alert   Chest:  clear to auscultation bilaterally without respiratory distress  CVS: regular rate and rhythm  Abdominal: soft, nontender, positive bowel sounds  Extremities: right AKA  Skin: warm and dry without rash      Labs:  Results from last 7 days   Lab Units 06/28/25  0405 06/27/25  1535 06/26/25  0531   WBC 10*3/mm3 7.64 7.80 10.25   HEMOGLOBIN g/dL 8.3* 6.4* 8.8*   HEMATOCRIT % 25.0* 19.6* 26.9*   PLATELETS 10*3/mm3 117* 125* 118*         Results from last 7 days   Lab Units 06/28/25  0405 06/26/25  0531 06/25/25  0305  "06/24/25  0420 06/23/25  0339   SODIUM mmol/L 133* 132* 135*   < > 135*   POTASSIUM mmol/L 4.4 4.1 4.4   < > 3.7   CHLORIDE mmol/L 98 99 104   < > 101   CO2 mmol/L 28.0 22.0 19.0*   < > 22.0   BUN mg/dL 23.6* 18.7 28.9*   < > 44.4*   CREATININE mg/dL 2.14* 1.98* 2.89*   < > 3.81*   CALCIUM mg/dL 8.3* 8.6 8.6   < > 8.6   EGFR mL/min/1.73 31.1* 34.2* 21.7*   < > 15.6*   BILIRUBIN mg/dL  --  0.6 0.3  --  0.3   ALK PHOS U/L  --  149* 127*  --  144*   ALT (SGPT) U/L  --  <5 <5  --  <5   AST (SGOT) U/L  --  34 24  --  12   GLUCOSE mg/dL 108* 113* 89   < > 99    < > = values in this interval not displayed.       Radiology:   Imaging Results (Last 72 Hours)       ** No results found for the last 72 hours. **            Culture:  No results found for: \"BLOODCX\", \"URINECX\", \"WOUNDCX\", \"MRSACX\", \"RESPCX\", \"STOOLCX\"      Assessment    End stage renal disease on hemodialysis  Hypertension  Anemia of CKD end of blood loss  CLL  Severe peripheral vascular disease--s/p multiple vascular procedures  S/p right AKA on 6/24  Chronic diastolic CHF  Hyponatremia   Bleeding from surgical site  Thrombocytopenia    Plan:  Labs reviewed.  Dialysis is due again on June 30.  Monitor labs.  Getting aspirin without Plavix now.       Bolivar Rueda MD  6/28/2025  13:57 CDT    "

## 2025-06-28 NOTE — PLAN OF CARE
Goal Outcome Evaluation:  Plan of Care Reviewed With: patient           Outcome Evaluation: pt had dialysis today, VSS, risdual limb drsg changed per MD, please see other notes from today, PRBCs started per order; AO today; c/o pain, voided this am prior to dialysis

## 2025-06-28 NOTE — PLAN OF CARE
Goal Outcome Evaluation:  Plan of Care Reviewed With: patient        Progress: no change  Outcome Evaluation: Patient received 2 units of blood, IID, tele NSR, rn air, no cough or resp distress noted, voids per urinal,  hgb 8.3 this am, dolphine mattress, encouraged to turn q2h, cont pulse ox, bed check on.

## 2025-06-29 LAB
ANION GAP SERPL CALCULATED.3IONS-SCNC: 11 MMOL/L (ref 5–15)
BH BB BLOOD EXPIRATION DATE: NORMAL
BH BB BLOOD EXPIRATION DATE: NORMAL
BH BB BLOOD TYPE BARCODE: 5100
BH BB BLOOD TYPE BARCODE: 5100
BH BB DISPENSE STATUS: NORMAL
BH BB DISPENSE STATUS: NORMAL
BH BB PRODUCT CODE: NORMAL
BH BB PRODUCT CODE: NORMAL
BH BB UNIT NUMBER: NORMAL
BH BB UNIT NUMBER: NORMAL
BUN SERPL-MCNC: 39.3 MG/DL (ref 8–23)
BUN/CREAT SERPL: 14.2 (ref 7–25)
CALCIUM SPEC-SCNC: 8.7 MG/DL (ref 8.6–10.5)
CHLORIDE SERPL-SCNC: 98 MMOL/L (ref 98–107)
CO2 SERPL-SCNC: 26 MMOL/L (ref 22–29)
CREAT SERPL-MCNC: 2.77 MG/DL (ref 0.76–1.27)
CROSSMATCH INTERPRETATION: NORMAL
CROSSMATCH INTERPRETATION: NORMAL
DEPRECATED RDW RBC AUTO: 60.2 FL (ref 37–54)
EGFRCR SERPLBLD CKD-EPI 2021: 22.8 ML/MIN/1.73
ERYTHROCYTE [DISTWIDTH] IN BLOOD BY AUTOMATED COUNT: 17.8 % (ref 12.3–15.4)
FERRITIN SERPL-MCNC: 744.5 NG/ML (ref 30–400)
GLUCOSE SERPL-MCNC: 97 MG/DL (ref 65–99)
HCT VFR BLD AUTO: 27 % (ref 37.5–51)
HGB BLD-MCNC: 8.8 G/DL (ref 13–17.7)
IRON 24H UR-MRATE: 24 MCG/DL (ref 59–158)
IRON SATN MFR SERPL: 14 % (ref 20–50)
MCH RBC QN AUTO: 31.4 PG (ref 26.6–33)
MCHC RBC AUTO-ENTMCNC: 32.6 G/DL (ref 31.5–35.7)
MCV RBC AUTO: 96.4 FL (ref 79–97)
PLATELET # BLD AUTO: 127 10*3/MM3 (ref 140–450)
PMV BLD AUTO: 9.9 FL (ref 6–12)
POTASSIUM SERPL-SCNC: 4.4 MMOL/L (ref 3.5–5.2)
RBC # BLD AUTO: 2.8 10*6/MM3 (ref 4.14–5.8)
RETICS # AUTO: 0.14 10*6/MM3 (ref 0.02–0.13)
RETICS/RBC NFR AUTO: 4.84 % (ref 0.7–1.9)
SODIUM SERPL-SCNC: 135 MMOL/L (ref 136–145)
TIBC SERPL-MCNC: 174 MCG/DL (ref 298–536)
TRANSFERRIN SERPL-MCNC: 117 MG/DL (ref 200–360)
UNIT  ABO: NORMAL
UNIT  ABO: NORMAL
UNIT  RH: NORMAL
UNIT  RH: NORMAL
WBC NRBC COR # BLD AUTO: 8.1 10*3/MM3 (ref 3.4–10.8)

## 2025-06-29 PROCEDURE — 99223 1ST HOSP IP/OBS HIGH 75: CPT | Performed by: INTERNAL MEDICINE

## 2025-06-29 PROCEDURE — 85045 AUTOMATED RETICULOCYTE COUNT: CPT | Performed by: FAMILY MEDICINE

## 2025-06-29 PROCEDURE — 83540 ASSAY OF IRON: CPT | Performed by: FAMILY MEDICINE

## 2025-06-29 PROCEDURE — 80048 BASIC METABOLIC PNL TOTAL CA: CPT | Performed by: INTERNAL MEDICINE

## 2025-06-29 PROCEDURE — 84466 ASSAY OF TRANSFERRIN: CPT | Performed by: FAMILY MEDICINE

## 2025-06-29 PROCEDURE — 82728 ASSAY OF FERRITIN: CPT | Performed by: FAMILY MEDICINE

## 2025-06-29 PROCEDURE — 85027 COMPLETE CBC AUTOMATED: CPT | Performed by: INTERNAL MEDICINE

## 2025-06-29 RX ORDER — HYDRALAZINE HYDROCHLORIDE 20 MG/ML
10 INJECTION INTRAMUSCULAR; INTRAVENOUS EVERY 6 HOURS PRN
Status: DISPENSED | OUTPATIENT
Start: 2025-06-29

## 2025-06-29 RX ADMIN — OXYCODONE AND ACETAMINOPHEN 1 TABLET: 325; 10 TABLET ORAL at 07:16

## 2025-06-29 RX ADMIN — ISOSORBIDE DINITRATE 10 MG: 10 TABLET ORAL at 14:03

## 2025-06-29 RX ADMIN — ISOSORBIDE DINITRATE 10 MG: 10 TABLET ORAL at 18:07

## 2025-06-29 RX ADMIN — ATORVASTATIN CALCIUM 10 MG: 10 TABLET, FILM COATED ORAL at 20:22

## 2025-06-29 RX ADMIN — OXYCODONE AND ACETAMINOPHEN 1 TABLET: 325; 10 TABLET ORAL at 20:24

## 2025-06-29 RX ADMIN — Medication 10 ML: at 20:22

## 2025-06-29 RX ADMIN — MONTELUKAST SODIUM 10 MG: 10 TABLET, COATED ORAL at 20:22

## 2025-06-29 RX ADMIN — DOCUSATE SODIUM 50 MG AND SENNOSIDES 8.6 MG 2 TABLET: 8.6; 5 TABLET, FILM COATED ORAL at 20:22

## 2025-06-29 RX ADMIN — NIFEDIPINE 120 MG: 60 TABLET, FILM COATED, EXTENDED RELEASE ORAL at 18:07

## 2025-06-29 RX ADMIN — ISOSORBIDE DINITRATE 10 MG: 10 TABLET ORAL at 09:00

## 2025-06-29 RX ADMIN — TAMSULOSIN HYDROCHLORIDE 0.4 MG: 0.4 CAPSULE ORAL at 20:22

## 2025-06-29 RX ADMIN — GABAPENTIN 300 MG: 300 CAPSULE ORAL at 20:22

## 2025-06-29 RX ADMIN — CARVEDILOL 12.5 MG: 6.25 TABLET, FILM COATED ORAL at 09:00

## 2025-06-29 RX ADMIN — DOCUSATE SODIUM 50 MG AND SENNOSIDES 8.6 MG 2 TABLET: 8.6; 5 TABLET, FILM COATED ORAL at 09:00

## 2025-06-29 RX ADMIN — LEVOTHYROXINE SODIUM 100 MCG: 0.1 TABLET ORAL at 05:31

## 2025-06-29 RX ADMIN — ASPIRIN 81 MG: 81 TABLET, COATED ORAL at 09:00

## 2025-06-29 RX ADMIN — CARVEDILOL 12.5 MG: 6.25 TABLET, FILM COATED ORAL at 20:22

## 2025-06-29 RX ADMIN — Medication 10 ML: at 09:03

## 2025-06-29 RX ADMIN — OXYCODONE AND ACETAMINOPHEN 1 TABLET: 325; 10 TABLET ORAL at 14:30

## 2025-06-29 RX ADMIN — OXYCODONE AND ACETAMINOPHEN 1 TABLET: 5; 325 TABLET ORAL at 02:24

## 2025-06-29 NOTE — PLAN OF CARE
Goal Outcome Evaluation:  Plan of Care Reviewed With: patient        Progress: improving  Outcome Evaluation: Pt resting well most of the night. C/o pain x1, see MAR. Turning in bed with minimal assistance, pain much more controlled. Dolphin mattress, refusing turns. AKA dressing c/d/i. Voiding via urinal. Safety maintained.

## 2025-06-29 NOTE — PROGRESS NOTES
[facility] Medicine Services  INPATIENT PROGRESS NOTE    Patient Name: Ricardo Hugo  Date of Admission: 6/19/2025  Today's Date: 06/29/25  Length of Stay: 10  Primary Care Physician: Nathan Zimmer MD    Subjective   Chief Complaint: Peripheral vascular disease.  HPI   Blood pressures on the high side but fluctuating.  Patient is on room air.  Sodium increased.  Creatinine increased.  White blood cells normal.  Hemoglobin stable.    Review of Systems   Constitutional:  Positive for activity change, appetite change and fatigue. Negative for chills and fever.   HENT:  Negative for hearing loss, nosebleeds, tinnitus and trouble swallowing.    Eyes:  Negative for visual disturbance.   Respiratory:  Negative for cough, chest tightness, shortness of breath and wheezing.    Cardiovascular:  Negative for chest pain, palpitations and leg swelling.   Gastrointestinal:  Negative for abdominal distention, abdominal pain, blood in stool, constipation, diarrhea, nausea and vomiting.   Endocrine: Negative for cold intolerance, heat intolerance, polydipsia, polyphagia and polyuria.   Genitourinary:  Negative for decreased urine volume, difficulty urinating, dysuria, flank pain, frequency and hematuria.   Musculoskeletal:  Positive for arthralgias, gait problem and myalgias. Negative for joint swelling.   Skin:  Negative for rash.   Allergic/Immunologic: Negative for immunocompromised state.   Neurological:  Positive for weakness. Negative for dizziness, syncope, light-headedness and headaches.   Hematological:  Negative for adenopathy. Does not bruise/bleed easily.   Psychiatric/Behavioral:  Negative for confusion and sleep disturbance. The patient is not nervous/anxious.         All pertinent negatives and positives are as above. All other systems have been reviewed and are negative unless otherwise stated.     Objective    Temp:  [97.7 °F (36.5 °C)-99.1 °F (37.3 °C)] 97.7 °F (36.5  °C)  Heart Rate:  [69-83] 83  Resp:  [14-16] 16  BP: (124-180)/(40-75) 166/59  Physical Exam  Vitals and nursing note reviewed.   Constitutional:       Comments: Advanced age.  Chronically ill.  Cachectic.   HENT:      Head: Normocephalic.   Eyes:      Conjunctiva/sclera: Conjunctivae normal.      Pupils: Pupils are equal, round, and reactive to light.   Cardiovascular:      Rate and Rhythm: Normal rate and regular rhythm.      Heart sounds: Normal heart sounds.   Pulmonary:      Effort: Pulmonary effort is normal. No respiratory distress.      Breath sounds: Normal breath sounds.      Comments: On room air.  Abdominal:      General: Bowel sounds are normal. There is no distension.      Palpations: Abdomen is soft.      Tenderness: There is no abdominal tenderness.   Musculoskeletal:         General: No swelling.      Cervical back: Neck supple.      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   Skin:     General: Skin is warm and dry.      Findings: No rash.   Neurological:      Mental Status: He is alert and oriented to person, place, and time.      Motor: Weakness present.      Coordination: Coordination abnormal.      Gait: Gait abnormal.   Psychiatric:         Mood and Affect: Mood normal.         Behavior: Behavior normal.         Thought Content: Thought content normal.             Results Review:  I have reviewed the labs, radiology results, and diagnostic studies.    Laboratory Data:   Results from last 7 days   Lab Units 06/29/25  0310 06/28/25  0405 06/27/25  1535   WBC 10*3/mm3 8.10 7.64 7.80   HEMOGLOBIN g/dL 8.8* 8.3* 6.4*   HEMATOCRIT % 27.0* 25.0* 19.6*   PLATELETS 10*3/mm3 127* 117* 125*        Results from last 7 days   Lab Units 06/29/25  0310 06/28/25  0405 06/26/25  0531 06/25/25  0305 06/24/25  0420 06/23/25  0339   SODIUM mmol/L 135* 133* 132* 135*   < > 135*   POTASSIUM mmol/L 4.4 4.4 4.1 4.4   < > 3.7   CHLORIDE mmol/L 98 98 99 104   < > 101   CO2 mmol/L  "26.0 28.0 22.0 19.0*   < > 22.0   BUN mg/dL 39.3* 23.6* 18.7 28.9*   < > 44.4*   CREATININE mg/dL 2.77* 2.14* 1.98* 2.89*   < > 3.81*   CALCIUM mg/dL 8.7 8.3* 8.6 8.6   < > 8.6   BILIRUBIN mg/dL  --   --  0.6 0.3  --  0.3   ALK PHOS U/L  --   --  149* 127*  --  144*   ALT (SGPT) U/L  --   --  <5 <5  --  <5   AST (SGOT) U/L  --   --  34 24  --  12   GLUCOSE mg/dL 97 108* 113* 89   < > 99    < > = values in this interval not displayed.       Culture Data:   No results found for: \"BLOODCX\", \"URINECX\", \"WOUNDCX\", \"MRSACX\", \"RESPCX\", \"STOOLCX\"    Radiology Data:   Imaging Results (Last 24 Hours)       ** No results found for the last 24 hours. **            I have reviewed the patient's current medications.     Assessment/Plan   Assessment  Active Hospital Problems    Diagnosis     **Vascular occlusion     COPD (chronic obstructive pulmonary disease)     Severe protein-calorie malnutrition     Chronic heart failure with preserved ejection fraction (HFpEF)     Thrombocytopenia     ESRD (end stage renal disease) on dialysis     Chronic diastolic (congestive) heart failure     Sensorineural hearing loss (SNHL), bilateral     Eustachian tube dysfunction, bilateral     Mixed hyperlipidemia     CLL (chronic lymphocytic leukemia)     CKD (chronic kidney disease) stage 5     Diastolic dysfunction     Resistant hypertension     Peripheral arterial disease     Essential hypertension        Treatment Plan  Vascular occlusion - With intervention on 6/22 right popliteal artery and peroneal artery.  Also had PTA to the right posterior tibial artery.  Was on heparin drip preprocedure. Post procedure on aspirin, Plavix, Lipitor.  Unfortunately despite attempts at revascularization patient continued to have PAD with poor blood flow to his lower extremity.  Vascular consult.  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  Plan- Evaluation on Monday for DC to SNF.  Plan on reassessing wound with bandage takedown on Monday 6/30. Patient likely okay to go " back to facility at that point if stable.     ESRD -continue HD as scheduled.  Nephrology following.  Creatinine slight increase.     Chronic diastolic CHF/hypertension-appears euvolemic/hyperlipidemia.  No signs of exacerbation.  Aspirin . Lipitor.  Coreg . Isordil.  Nifedipine.  Hold Aldactone . Valsartan.  Hydralazine on hold.  Clonidine was discontinued.  Plavix was held.  Procardia .     Chronic constipation -continue bowel regimen.       Anemia of chronic disease -monitor closely.  Received 2 uprbc 6/27/2025.  Hemoglobin stable.  Anemia panel . Procrit .     COPD/no exacerbation.  Singulair.  On room air.    Neuropathy.  Neurontin nightly.    Hypothyroidism.  Synthroid.    Hyponatremia.  Sodium increased.    Nausea.  Phenergan as needed.    Constipation.  Giana-Colace.    Prostate hypertrophy.  Flomax.    Advanced age.  75 years old    Pain.  Percocet as needed.  Narcan as needed.    Nutrition . Regular diet.  Boost supplement.    Deconditioning.  PT consult.    Patient is from rehab.    Medical Decision Making  Number and Complexity of problems: Vascular occlusion/dialysis/CHF/chronic constipation/anemia  Differential Diagnosis: None    Conditions and Status        Condition is unchanged.     [facility] Data  External documents reviewed: Previous note .  Cardiac tracing (EKG, telemetry) interpretation: Sinus .  Radiology interpretation: None  Labs reviewed: Laboratory  Any tests that were considered but not ordered: Lab in a.m.     Decision rules/scores evaluated (example [id number], Wells, etc): None     Discussed with: Patient     Care Planning  Shared decision making: Patient  Code status and discussions: Full code    Disposition  Social Determinants of Health that impact treatment or disposition: From rehab  1 to 3 days .        Electronically signed by Garrett Comer MD, 06/29/25, 10:47 CDT.

## 2025-06-29 NOTE — PLAN OF CARE
Goal Outcome Evaluation:              Outcome Evaluation: Pt A&O x4. C/o pain, see MAR. Pt on dolphin mattress, occasionally refusing turns. Voiding via urinal. Urine produced scant bloody discharge. MD aware. AKA dressing clean, dry, intact. Safety maintained.

## 2025-06-29 NOTE — PROGRESS NOTES
Nephrology ([facility]) Progress Note      Patient:  Ricardo Hugo  YOB: 1950  Date of Service: 6/29/2025  MRN: [id number]   Acct: [id number]   Primary Care Physician: Nathan Zimmer MD  Advance Directive:   Code Status and Medical Interventions: CPR (Attempt to Resuscitate); Full Support   Ordered at: 06/19/25 1944     Code Status (Patient has no pulse and is not breathing):    CPR (Attempt to Resuscitate)     Medical Interventions (Patient has pulse or is breathing):    Full Support     Admit Date: 6/19/2025       Hospital Day: 10  Referring Provider: No Known Provider      Patient personally seen and examined.  Complete chart including Consults, Notes, Operative Reports, Labs, Cardiology, and Radiology studies reviewed as able.        Subjective:  Ricardo Hugo is a 75 y.o. male for whom we were consulted for evaluation and treatment of end-stage renal disease on maintenance dialysis. Patient has permacatheter as a dialysis access. He also has history of peripheral vascular disease, right carotid endarterectomy, PTA of the right external iliac artery, right SFA. Presenting to the emergency room complaining of right leg pain. His pain has progressively getting worse and has decided come to the emergency room. Patient is currently being evaluated by Dr. Zabala for another arteriogram, look at the status of his blood flow to the right leg. On June 28 patient underwent intravascular lithotripsy of popliteal artery, posterior tibial artery followed by balloon angioplasties. Tolerating HD treatments well. Underwent right AKA on 6/24. Tolerating HD well during the admission.  Patient has been bleeding from his surgical site.  He dropped his hemoglobin and has required 2 units of PRBCs.  We provided desmopressin.  We asked him to stop using the heating blanket.  Today, patient has not been bleeding and his hemoglobin has been stable. No new overnight issues.  He is status  post dialysis on June 27.      Allergies:  Ondansetron, Zofran [ondansetron hcl], Lortab [hydrocodone-acetaminophen], and Allopurinol    Home Meds:  Medications Prior to Admission   Medication Sig Dispense Refill Last Dose/Taking    ALPRAZolam (XANAX) 0.25 MG tablet Take 1 tablet by mouth Every Night.   Taking    aspirin (aspirin) 81 MG EC tablet Take 1 tablet by mouth Daily.   Taking    atorvastatin (LIPITOR) 10 MG tablet Take 1 tablet by mouth Daily. 90 tablet 3 Taking    Budeson-Glycopyrrol-Formoterol (Breztri Aerosphere) 160-9-4.8 MCG/ACT aerosol inhaler Inhale 2 puffs 2 (Two) Times a Day.   Taking    calcitriol (ROCALTROL) 0.5 MCG capsule Take 1 capsule by mouth Daily. 90 capsule 2 Taking    carvedilol (COREG) 12.5 MG tablet Take 1 tablet by mouth 2 (Two) Times a Day With Meals.   Taking    clopidogrel (PLAVIX) 75 MG tablet Take 1 tablet by mouth Daily.   Taking    docusate sodium (COLACE) 100 MG capsule Take 1 capsule by mouth 3 (Three) Times a Day As Needed for Constipation.   Taking As Needed    empagliflozin (Jardiance) 10 MG tablet tablet Take 1 tablet by mouth Daily.   Taking    esomeprazole (nexIUM) 20 MG capsule Take 1 capsule by mouth Every Morning Before Breakfast.   Taking    gabapentin (NEURONTIN) 300 MG capsule Take 1 capsule by mouth 2 (Two) Times a Day.   Taking    hydrALAZINE (APRESOLINE) 100 MG tablet Take 1 tablet by mouth 3 (Three) Times a Day.   Taking    iron polysaccharides (NIFEREX) 150 MG capsule Take 1 capsule by mouth Daily.   Taking    isosorbide dinitrate (ISORDIL) 10 MG tablet Take 1 tablet by mouth 3 (Three) Times a Day. 270 tablet 2 Taking    levothyroxine (Synthroid) 100 MCG tablet Take 1 tablet by mouth Every Morning. 90 tablet 2 Taking    magnesium chloride ER 64 MG DR tablet Take 1 tablet by mouth Daily.   Taking    montelukast (SINGULAIR) 10 MG tablet Take 1 tablet by mouth Every Night.   Taking    Multiple Vitamins-Minerals (PRESERVISION/LUTEIN) capsule Take 1 capsule by  mouth 2 (two) times a day.   Taking    NIFEdipine XL (ADALAT CC) 60 MG 24 hr tablet Take 2 tablets by mouth Daily.   Taking    spironolactone (ALDACTONE) 25 MG tablet Take 1 tablet by mouth Daily.   Taking    tamsulosin (FLOMAX) 0.4 MG capsule 24 hr capsule Take 1 capsule by mouth Every Night.   Taking    valsartan (DIOVAN) 320 MG tablet Take 1 tablet by mouth Daily.   Taking    vitamin D (ERGOCALCIFEROL) 1.25 MG (89052 UT) capsule capsule Take 1 capsule by mouth 1 (One) Time Per Week. Mondays   Taking    albuterol sulfate  (90 Base) MCG/ACT inhaler Inhale 2 puffs Every 6 (Six) Hours As Needed for Wheezing or Shortness of Air.       bisacodyl (DULCOLAX) 10 MG suppository Insert 1 suppository into the rectum Daily As Needed for Constipation.       desoximetasone (TOPICORT) 0.25 % cream Apply 1 Application topically to the appropriate area as directed 2 (Two) Times a Day As Needed for Irritation. irritation 60 g 0     fluticasone (FLONASE) 50 MCG/ACT nasal spray Administer 2 sprays into the nostril(s) as directed by provider Daily As Needed for Allergies.       naloxone (NARCAN) 4 MG/0.1ML nasal spray Call 911. Don't prime. [place] in 1 nostril for overdose. Repeat in 2-3 minutes in other nostril if no or minimal breathing/responsiveness. 2 each 0     polyethylene glycol (MIRALAX) 17 g packet Take 17 g by mouth Daily As Needed (constipation).       senna 8.6 MG tablet Take 2 tablets by mouth Daily As Needed for Constipation.       [DISCONTINUED] cloNIDine (CATAPRES) 0.3 MG tablet Take 1 tablet by mouth 3 times a day. (Patient not taking: Reported on 6/21/2025)   Not Taking    [DISCONTINUED] gabapentin (NEURONTIN) 300 MG capsule Take 1 capsule by mouth Every Night. 24 capsule 0     [DISCONTINUED] omeprazole (priLOSEC) 20 MG capsule Take 1 capsule by mouth Daily. (Patient not taking: Reported on 6/21/2025)   Not Taking    [DISCONTINUED] oxyCODONE-acetaminophen (PERCOCET) 5-325 MG per tablet Take 1 tablet by mouth  Every 6 (Six) Hours As Needed for Moderate Pain. 24 tablet 0        Medicines:  Current Facility-Administered Medications   Medication Dose Route Frequency Provider Last Rate Last Admin    acetaminophen (TYLENOL) tablet 650 mg  650 mg Oral Q4H PRN Blayne Zabala MD   650 mg at 06/28/25 1601    Or    acetaminophen (TYLENOL) 160 MG/5ML oral solution 650 mg  650 mg Oral Q4H PRN Blayne Zabala MD        Or    acetaminophen (TYLENOL) suppository 650 mg  650 mg Rectal Q4H PRN Blayne Zabala MD        aspirin EC tablet 81 mg  81 mg Oral Daily Blayne Zabala MD   81 mg at 06/29/25 0900    atorvastatin (LIPITOR) tablet 10 mg  10 mg Oral Nightly Blayne Zabala MD   10 mg at 06/28/25 2129    sennosides-docusate (PERICOLACE) 8.6-50 MG per tablet 2 tablet  2 tablet Oral BID Blayne Zabala MD   2 tablet at 06/29/25 0900    And    polyethylene glycol (MIRALAX) packet 17 g  17 g Oral Daily PRN Blayne Zabala MD        And    bisacodyl (DULCOLAX) EC tablet 5 mg  5 mg Oral Daily PRN Blayne Zabala MD        And    bisacodyl (DULCOLAX) suppository 10 mg  10 mg Rectal Daily PRN Blayne Zabala MD        carvedilol (COREG) tablet 12.5 mg  12.5 mg Oral BID With Meals Jamie Pineda MD   12.5 mg at 06/29/25 0900    [Held by provider] clopidogrel (PLAVIX) tablet 75 mg  75 mg Oral Daily Blayne Zabala MD   75 mg at 06/27/25 1202    epoetin cecile-epbx (RETACRIT) injection 10,000 Units  10,000 Units Subcutaneous Once per day on Monday Wednesday Friday Blayne Zabala MD   10,000 Units at 06/27/25 1202    gabapentin (NEURONTIN) capsule 300 mg  300 mg Oral Nightly Blayne Zabala MD   300 mg at 06/28/25 2128    [Held by provider] hydrALAZINE (APRESOLINE) tablet 100 mg  100 mg Oral TID Blayne Zabala MD   100 mg at 06/21/25 1941    isosorbide dinitrate (ISORDIL) tablet 10 mg  10 mg Oral TID - Nitrates Blayne Zabala MD   10 mg at 06/29/25 1401    levothyroxine (SYNTHROID, LEVOTHROID) tablet 100 mcg  100 mcg  Oral Q AM Blayne Zabala MD   100 mcg at 06/29/25 0531    montelukast (SINGULAIR) tablet 10 mg  10 mg Oral Nightly Blayne Zabala MD   10 mg at 06/28/25 2129    naloxone (NARCAN) injection 0.4 mg  0.4 mg Intravenous Q5 Min PRN Blayne Zabala MD        NIFEdipine XL (PROCARDIA XL) 24 hr tablet 120 mg  120 mg Oral Daily Blayne Zabala MD   120 mg at 06/27/25 1745    nitroglycerin (NITROSTAT) SL tablet 0.4 mg  0.4 mg Sublingual Q5 Min PRN Blayne Zabala MD        oxyCODONE-acetaminophen (PERCOCET)  MG per tablet 1 tablet  1 tablet Oral Q6H PRN Jamie Pineda MD   1 tablet at 06/29/25 0716    oxyCODONE-acetaminophen (PERCOCET) 5-325 MG per tablet 1 tablet  1 tablet Oral Q4H PRN Blayne Zabala MD   1 tablet at 06/29/25 0224    promethazine (PHENERGAN) tablet 12.5 mg  12.5 mg Oral Q6H PRN Blayne Zabala MD   12.5 mg at 06/20/25 2243    sodium chloride 0.9 % flush 10 mL  10 mL Intravenous Q12H Blayne Zabala MD   10 mL at 06/29/25 0903    sodium chloride 0.9 % flush 10 mL  10 mL Intravenous PRN Blayne Zabala MD        sodium chloride 0.9 % infusion 40 mL  40 mL Intravenous PRN Blayne Zabala MD        [Held by provider] spironolactone (ALDACTONE) tablet 25 mg  25 mg Oral Daily Blayne Zabala MD   25 mg at 06/22/25 0905    tamsulosin (FLOMAX) 24 hr capsule 0.4 mg  0.4 mg Oral Nightly Blayne Zabala MD   0.4 mg at 06/28/25 2129    [Held by provider] valsartan (DIOVAN) tablet 320 mg  320 mg Oral Daily Blayne Zabala MD   320 mg at 06/22/25 0905       Past Medical History:  Past Medical History:   Diagnosis Date    3-vessel CAD 08/11/2020    Allergic rhinitis     Anemia     Anxiety disorder 04/27/2020    Arthritis     Asymmetrical sensorineural hearing loss 06/28/2017    Atherosclerosis of native artery of both lower extremities with intermittent claudication 07/18/2019    Avascular necrosis of femoral head, left 07/11/2020    right hip after surgery    Carotid stenosis     Chronic  mucoid otitis media     Chronic rhinitis     COPD (chronic obstructive pulmonary disease)     Coronary artery disease     HEART BYPASS 2004    Crohn's disease of large intestine with other complication 07/30/2020    Chronic diarrhea Colonoscopy July 2020 revealed mild patchy scattered hemosiderin staining with inflammation more so in rectosigmoid area.  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    Iron deficiency anemia 07/14/2020    Mixed hearing loss of left ear     PAD (peripheral artery disease) 08/11/2020    Perianal abscess     Pernicious anemia 08/17/2020    took shots but never diagnosed with b12 deficiency    Personal history of alcoholism 08/11/2020    quit drinking in 2013    Prostatic hypertrophy 08/11/2020    Sensorineural hearing loss     Sepsis with acute renal failure 09/15/2020    Shortness of breath 05/27/2021    Tinnitus     Ventricular tachycardia, nonsustained 07/14/2020    Weight loss 07/11/2020       Past Surgical History:  Past Surgical History:   Procedure Laterality Date    ABOVE KNEE AMPUTATION Right 6/24/2025    Procedure: right above knee amputation;  Surgeon: Blayne Zabala MD;  Location:  [facility] OR;  Service: Vascular;  Laterality: Right;    AORTOGRAM Right 4/25/2025    Procedure: RIGHT LOWER EXTREMITY ANGIOGRAM, SHOCKWAVE LITHOTRIPSY, BALLOON ANGIOPLASTY, MYNX CLOSURE;  Surgeon: Gil Pineda DO;  Location:  [facility] HYBRID OR;  Service: Vascular;  Laterality: Right;    AORTOGRAM Left 5/22/2025    Procedure: LEFT LOWER EXTREMITY ANGIOGRAM, SHOCKWAVE LITHOTRIPSY, BALLOON ANGIOPLASTY, STENT PLACEMENT, MYNX CLOSURE;  Surgeon:  Blayne Zabala MD;  Location: [facility] HYBRID OR;  Service: Vascular;  Laterality: Left;    AORTOGRAM Right 5/30/2025    Procedure: AORTOILIAC ANGIOGRAM WITH LEFT LOWER EXTREMITY ANGIOGRAM;  Surgeon: Gil Pineda DO;  Location: [facility] HYBRID OR;  Service: Vascular;  Laterality: Right;    AORTOGRAM Right 6/20/2025    Procedure: right lower extremity arteriogram, shockwave lithotripsy, balloon angioplasty, mynx closue;  Surgeon: Blayne Zabala MD;  Location: [facility] HYBRID OR;  Service: Vascular;  Laterality: Right;    ARTERY SURGERY  2021    right side on neck    CAROTID ENDARTERECTOMY Right 05/10/2021    Procedure: RIGHT CAROTID ENDARTERECTOMY WITH EEG;  Surgeon: Gil Pineda DO;  Location: [facility] HYBRID OR 12;  Service: Vascular;  Laterality: Right;    COLONOSCOPY N/A 07/02/2020    Procedure: COLONOSCOPY WITH ANESTHESIA;  Surgeon: Adrien Brewster MD;  Location: [facility] ENDOSCOPY;  Service: Gastroenterology;  Laterality: N/A;  pre op: diarrhea  post op: polyps  PCP: Joe Velasco MD    COLONOSCOPY N/A 10/13/2020    Procedure: COLONOSCOPY WITH ANESTHESIA;  Surgeon: Adrien Brewster MD;  Location: [facility] ENDOSCOPY;  Service: Gastroenterology;  Laterality: N/A;  Pre: Chronic Diarrhea, Crohn's  Post: AVM  Dr. Neftali Velasco  CO2 Inflation Used    COLONOSCOPY N/A 12/08/2023    Procedure: COLONOSCOPY WITH ANESTHESIA;  Surgeon: Adrien Brewster MD;  Location: [facility] ENDOSCOPY;  Service: Gastroenterology;  Laterality: N/A;  pre chrone's disease  post sub optimal prep, polyp, chrone's      CORONARY ARTERY BYPASS GRAFT  2003    x3    ENDOSCOPY N/A 11/02/2021    Procedure: ESOPHAGOGASTRODUODENOSCOPY WITH ANESTHESIA;  Surgeon: Bridger Bell MD;  Location: [facility] ENDOSCOPY;  Service: Gastroenterology;  Laterality: N/A;  pre anemia;gi bleed  post  gi bleed;schatski ring  Dr. ERIC Velasco    ENDOSCOPY N/A 10/10/2023    Procedure: ESOPHAGOGASTRODUODENOSCOPY WITH ANESTHESIA;  Surgeon: Adrien Brewster  MD BETTY;  Location: [facility] ENDOSCOPY;  Service: Gastroenterology;  Laterality: N/A;  preop; anemia  postop esophagitis ; R/O barretts   PCP Randall Beata    EYE SURGERY Bilateral     catorac    INCISION AND DRAINAGE PERIRECTAL ABSCESS N/A 2017    Procedure: INCISION AND DRAINAGE OF JEET ANAL ABSCESS;  Surgeon: Lynette Smith MD;  Location: [facility] OR;  Service:     INGUINAL HERNIA REPAIR Bilateral 2023    Procedure: INGUINAL HERNIA BILATERAL REPAIR LAPAROSCOPIC WITH DAVINCI ROBOT WITH MESH;  Surgeon: Tahira Rivera MD;  Location: [facility] OR;  Service: Robotics - DaVinci;  Laterality: Bilateral;    INSERTION HEMODIALYSIS CATHETER N/A 2025    Procedure: HEMODIALYSIS CATHETER PLACEMENT;  Surgeon: Gil Pineda DO;  Location: [facility] HYBRID OR;  Service: Vascular;  Laterality: N/A;    MYRINGOTOMY W/ TUBES Left 2017    06/10/2016    TONSILLECTOMY      TOTAL HIP ARTHROPLASTY Right        Family History  Family History   Problem Relation Age of Onset    Breast cancer Mother     Dementia Father     Glaucoma Father     No Known Problems Daughter     Colon polyps Neg Hx     Colon cancer Neg Hx        Social History  Social History     Socioeconomic History    Marital status:    Tobacco Use    Smoking status: Former     Current packs/day: 0.00     Average packs/day: 0.5 packs/day for 25.8 years (12.9 ttl pk-yrs)     Types: Cigarettes     Start date:      Quit date: 10/13/2013     Years since quittin.7     Passive exposure: Past    Smokeless tobacco: Never    Tobacco comments:     quit 2013   Vaping Use    Vaping status: Former    Substances: Nicotine    Devices: Pre-filled or refillable cartridge   Substance and Sexual Activity    Alcohol use: Not Currently    Drug use: No    Sexual activity: Not Currently     Partners: Female       Review of Systems:  History obtained from chart review and the patient  General ROS: No fever or chills  Respiratory ROS: No cough, shortness of  breath, wheezing  Cardiovascular ROS: No chest pain or palpitations  Gastrointestinal ROS: No abdominal pain or melena  Genito-Urinary ROS: No dysuria or hematuria  Psych ROS: No anxiety and depression  14 point ROS reviewed with the patient and negative except as noted above and in the HPI unless unable to obtain.    Objective:  Patient Vitals for the past 24 hrs:   BP Temp Temp src Pulse Resp SpO2 Weight   06/29/25 1219 156/46 97.2 °F (36.2 °C) Axillary 64 16 99 % --   06/29/25 0718 166/59 -- -- -- -- -- --   06/29/25 0500 180/54 97.7 °F (36.5 °C) Oral 83 16 97 % 36.8 kg (81 lb 1.6 oz)   06/28/25 2346 151/75 98 °F (36.7 °C) Oral 72 16 92 % --   06/28/25 2016 162/40 99.1 °F (37.3 °C) Oral 72 16 93 % --   06/28/25 1555 124/43 98.1 °F (36.7 °C) Oral 69 16 98 % --       Intake/Output Summary (Last 24 hours) at 6/29/2025 1405  Last data filed at 6/29/2025 0718  Gross per 24 hour   Intake --   Output 925 ml   Net -925 ml       General: awake/alert   Chest:  clear to auscultation bilaterally without respiratory distress  CVS: regular rate and rhythm  Abdominal: soft, nontender, positive bowel sounds  Extremities: right AKA  Skin: warm and dry without rash      Labs:  Results from last 7 days   Lab Units 06/29/25  0310 06/28/25  0405 06/27/25  1535   WBC 10*3/mm3 8.10 7.64 7.80   HEMOGLOBIN g/dL 8.8* 8.3* 6.4*   HEMATOCRIT % 27.0* 25.0* 19.6*   PLATELETS 10*3/mm3 127* 117* 125*         Results from last 7 days   Lab Units 06/29/25  0310 06/28/25  0405 06/26/25  0531 06/25/25  0305 06/24/25  0420 06/23/25  0339   SODIUM mmol/L 135* 133* 132* 135*   < > 135*   POTASSIUM mmol/L 4.4 4.4 4.1 4.4   < > 3.7   CHLORIDE mmol/L 98 98 99 104   < > 101   CO2 mmol/L 26.0 28.0 22.0 19.0*   < > 22.0   BUN mg/dL 39.3* 23.6* 18.7 28.9*   < > 44.4*   CREATININE mg/dL 2.77* 2.14* 1.98* 2.89*   < > 3.81*   CALCIUM mg/dL 8.7 8.3* 8.6 8.6   < > 8.6   EGFR mL/min/1.73 22.8* 31.1* 34.2* 21.7*   < > 15.6*   BILIRUBIN mg/dL  --   --  0.6 0.3  --   "0.3   ALK PHOS U/L  --   --  149* 127*  --  144*   ALT (SGPT) U/L  --   --  <5 <5  --  <5   AST (SGOT) U/L  --   --  34 24  --  12   GLUCOSE mg/dL 97 108* 113* 89   < > 99    < > = values in this interval not displayed.       Radiology:   Imaging Results (Last 72 Hours)       ** No results found for the last 72 hours. **            Culture:  No results found for: \"BLOODCX\", \"URINECX\", \"WOUNDCX\", \"MRSACX\", \"RESPCX\", \"STOOLCX\"      Assessment    End stage renal disease on hemodialysis  Hypertension  Anemia of CKD end of blood loss  CLL  Severe peripheral vascular disease--s/p multiple vascular procedures  S/p right AKA on 6/24  Chronic diastolic CHF  Hyponatremia   Bleeding from surgical site  Thrombocytopenia    Plan:  Labs reviewed.  Dialysis is due again on June 30.  Monitor labs.  Getting aspirin without Plavix now.       Bolivar Rueda MD  6/29/2025  14:05 CDT    "

## 2025-06-29 NOTE — PLAN OF CARE
Goal Outcome Evaluation:              Outcome Evaluation: Pt A&O x4. VSS. Room air. Bed alarm set on dolphine mattress. Hgb 8.3 this morning. Safety maintained.                              Sachin Campos

## 2025-06-30 VITALS
SYSTOLIC BLOOD PRESSURE: 128 MMHG | HEART RATE: 65 BPM | OXYGEN SATURATION: 99 % | BODY MASS INDEX: 11.99 KG/M2 | TEMPERATURE: 98.7 F | HEIGHT: 72 IN | WEIGHT: 88.5 LBS | RESPIRATION RATE: 16 BRPM | DIASTOLIC BLOOD PRESSURE: 49 MMHG

## 2025-06-30 LAB
ALBUMIN SERPL-MCNC: 2.8 G/DL (ref 3.5–5.2)
ALBUMIN/GLOB SERPL: 1.1 G/DL
ALP SERPL-CCNC: 206 U/L (ref 39–117)
ALT SERPL W P-5'-P-CCNC: 19 U/L (ref 1–41)
ANION GAP SERPL CALCULATED.3IONS-SCNC: 12 MMOL/L (ref 5–15)
AST SERPL-CCNC: 38 U/L (ref 1–40)
BASOPHILS # BLD AUTO: 0.09 10*3/MM3 (ref 0–0.2)
BASOPHILS NFR BLD AUTO: 0.7 % (ref 0–1.5)
BILIRUB SERPL-MCNC: 0.4 MG/DL (ref 0–1.2)
BUN SERPL-MCNC: 52.4 MG/DL (ref 8–23)
BUN/CREAT SERPL: 17.2 (ref 7–25)
CALCIUM SPEC-SCNC: 8.7 MG/DL (ref 8.6–10.5)
CHLORIDE SERPL-SCNC: 100 MMOL/L (ref 98–107)
CHOLEST SERPL-MCNC: 78 MG/DL (ref 0–200)
CO2 SERPL-SCNC: 21 MMOL/L (ref 22–29)
CREAT SERPL-MCNC: 3.04 MG/DL (ref 0.76–1.27)
CYTO UR: NORMAL
DEPRECATED RDW RBC AUTO: 58 FL (ref 37–54)
EGFRCR SERPLBLD CKD-EPI 2021: 20.4 ML/MIN/1.73
EOSINOPHIL # BLD AUTO: 0.34 10*3/MM3 (ref 0–0.4)
EOSINOPHIL NFR BLD AUTO: 2.7 % (ref 0.3–6.2)
ERYTHROCYTE [DISTWIDTH] IN BLOOD BY AUTOMATED COUNT: 16.9 % (ref 12.3–15.4)
FOLATE SERPL-MCNC: 7.27 NG/ML (ref 4.78–24.2)
GLOBULIN UR ELPH-MCNC: 2.5 GM/DL
GLUCOSE SERPL-MCNC: 99 MG/DL (ref 65–99)
HBA1C MFR BLD: 4.9 % (ref 4.8–5.6)
HCT VFR BLD AUTO: 26.6 % (ref 37.5–51)
HDLC SERPL-MCNC: 26 MG/DL (ref 40–60)
HGB BLD-MCNC: 8.5 G/DL (ref 13–17.7)
IMM GRANULOCYTES # BLD AUTO: 0.14 10*3/MM3 (ref 0–0.05)
IMM GRANULOCYTES NFR BLD AUTO: 1.1 % (ref 0–0.5)
LAB AP CASE REPORT: NORMAL
LDLC SERPL CALC-MCNC: 33 MG/DL (ref 0–100)
LDLC/HDLC SERPL: 1.28 {RATIO}
LYMPHOCYTES # BLD AUTO: 1.41 10*3/MM3 (ref 0.7–3.1)
LYMPHOCYTES NFR BLD AUTO: 11.4 % (ref 19.6–45.3)
Lab: NORMAL
MCH RBC QN AUTO: 31.4 PG (ref 26.6–33)
MCHC RBC AUTO-ENTMCNC: 32 G/DL (ref 31.5–35.7)
MCV RBC AUTO: 98.2 FL (ref 79–97)
MONOCYTES # BLD AUTO: 1.09 10*3/MM3 (ref 0.1–0.9)
MONOCYTES NFR BLD AUTO: 8.8 % (ref 5–12)
NEUTROPHILS NFR BLD AUTO: 75.3 % (ref 42.7–76)
NEUTROPHILS NFR BLD AUTO: 9.35 10*3/MM3 (ref 1.7–7)
NRBC BLD AUTO-RTO: 0 /100 WBC (ref 0–0.2)
PATH REPORT.FINAL DX SPEC: NORMAL
PATH REPORT.GROSS SPEC: NORMAL
PLATELET # BLD AUTO: 135 10*3/MM3 (ref 140–450)
PMV BLD AUTO: 9.7 FL (ref 6–12)
POTASSIUM SERPL-SCNC: 4.9 MMOL/L (ref 3.5–5.2)
PROT SERPL-MCNC: 5.3 G/DL (ref 6–8.5)
RBC # BLD AUTO: 2.71 10*6/MM3 (ref 4.14–5.8)
SODIUM SERPL-SCNC: 133 MMOL/L (ref 136–145)
T4 FREE SERPL-MCNC: 1.36 NG/DL (ref 0.92–1.68)
TRIGL SERPL-MCNC: 93 MG/DL (ref 0–150)
TSH SERPL DL<=0.05 MIU/L-ACNC: 9.78 UIU/ML (ref 0.27–4.2)
VIT B12 BLD-MCNC: 446 PG/ML (ref 211–946)
VLDLC SERPL-MCNC: 19 MG/DL (ref 5–40)
WBC NRBC COR # BLD AUTO: 12.42 10*3/MM3 (ref 3.4–10.8)

## 2025-06-30 PROCEDURE — 85025 COMPLETE CBC W/AUTO DIFF WBC: CPT | Performed by: FAMILY MEDICINE

## 2025-06-30 PROCEDURE — 84439 ASSAY OF FREE THYROXINE: CPT | Performed by: FAMILY MEDICINE

## 2025-06-30 PROCEDURE — 83036 HEMOGLOBIN GLYCOSYLATED A1C: CPT | Performed by: FAMILY MEDICINE

## 2025-06-30 PROCEDURE — 82746 ASSAY OF FOLIC ACID SERUM: CPT | Performed by: FAMILY MEDICINE

## 2025-06-30 PROCEDURE — 25010000002 MORPHINE PER 10 MG: Performed by: STUDENT IN AN ORGANIZED HEALTH CARE EDUCATION/TRAINING PROGRAM

## 2025-06-30 PROCEDURE — 97110 THERAPEUTIC EXERCISES: CPT

## 2025-06-30 PROCEDURE — 25010000002 EPOETIN ALFA-EPBX 10000 UNIT/ML SOLUTION: Performed by: STUDENT IN AN ORGANIZED HEALTH CARE EDUCATION/TRAINING PROGRAM

## 2025-06-30 PROCEDURE — 84443 ASSAY THYROID STIM HORMONE: CPT | Performed by: FAMILY MEDICINE

## 2025-06-30 PROCEDURE — 25010000002 HYDRALAZINE PER 20 MG

## 2025-06-30 PROCEDURE — 80053 COMPREHEN METABOLIC PANEL: CPT | Performed by: FAMILY MEDICINE

## 2025-06-30 PROCEDURE — 82607 VITAMIN B-12: CPT | Performed by: FAMILY MEDICINE

## 2025-06-30 PROCEDURE — 80061 LIPID PANEL: CPT | Performed by: FAMILY MEDICINE

## 2025-06-30 PROCEDURE — 97530 THERAPEUTIC ACTIVITIES: CPT

## 2025-06-30 RX ORDER — IRON POLYSACCHARIDE COMPLEX 150 MG
150 CAPSULE ORAL DAILY
Status: DISPENSED | OUTPATIENT
Start: 2025-06-30

## 2025-06-30 RX ORDER — MORPHINE SULFATE 2 MG/ML
2 INJECTION, SOLUTION INTRAMUSCULAR; INTRAVENOUS EVERY 4 HOURS PRN
Status: DISPENSED | OUTPATIENT
Start: 2025-06-30 | End: 2025-07-05

## 2025-06-30 RX ADMIN — EPOETIN ALFA-EPBX 10000 UNITS: 10000 INJECTION, SOLUTION INTRAVENOUS; SUBCUTANEOUS at 12:24

## 2025-06-30 RX ADMIN — ATORVASTATIN CALCIUM 10 MG: 10 TABLET, FILM COATED ORAL at 20:21

## 2025-06-30 RX ADMIN — ACETAMINOPHEN 650 MG: 325 TABLET, FILM COATED ORAL at 09:24

## 2025-06-30 RX ADMIN — ISOSORBIDE DINITRATE 10 MG: 10 TABLET ORAL at 12:23

## 2025-06-30 RX ADMIN — LEVOTHYROXINE SODIUM 100 MCG: 0.1 TABLET ORAL at 06:06

## 2025-06-30 RX ADMIN — CARVEDILOL 12.5 MG: 6.25 TABLET, FILM COATED ORAL at 12:25

## 2025-06-30 RX ADMIN — MORPHINE SULFATE 2 MG: 2 INJECTION, SOLUTION INTRAMUSCULAR; INTRAVENOUS at 10:03

## 2025-06-30 RX ADMIN — ACETAMINOPHEN 650 MG: 325 TABLET, FILM COATED ORAL at 00:00

## 2025-06-30 RX ADMIN — TAMSULOSIN HYDROCHLORIDE 0.4 MG: 0.4 CAPSULE ORAL at 20:22

## 2025-06-30 RX ADMIN — ASPIRIN 81 MG: 81 TABLET, COATED ORAL at 12:23

## 2025-06-30 RX ADMIN — Medication 10 ML: at 12:26

## 2025-06-30 RX ADMIN — GABAPENTIN 300 MG: 300 CAPSULE ORAL at 20:21

## 2025-06-30 RX ADMIN — Medication 150 MG: at 15:02

## 2025-06-30 RX ADMIN — NIFEDIPINE 120 MG: 60 TABLET, FILM COATED, EXTENDED RELEASE ORAL at 17:30

## 2025-06-30 RX ADMIN — DOCUSATE SODIUM 50 MG AND SENNOSIDES 8.6 MG 2 TABLET: 8.6; 5 TABLET, FILM COATED ORAL at 20:22

## 2025-06-30 RX ADMIN — OXYCODONE AND ACETAMINOPHEN 1 TABLET: 325; 10 TABLET ORAL at 15:04

## 2025-06-30 RX ADMIN — BISACODYL 5 MG: 5 TABLET, COATED ORAL at 20:22

## 2025-06-30 RX ADMIN — MONTELUKAST SODIUM 10 MG: 10 TABLET, COATED ORAL at 20:22

## 2025-06-30 RX ADMIN — Medication 10 ML: at 20:22

## 2025-06-30 RX ADMIN — DOCUSATE SODIUM 50 MG AND SENNOSIDES 8.6 MG 2 TABLET: 8.6; 5 TABLET, FILM COATED ORAL at 12:23

## 2025-06-30 RX ADMIN — OXYCODONE AND ACETAMINOPHEN 1 TABLET: 5; 325 TABLET ORAL at 06:06

## 2025-06-30 RX ADMIN — HYDRALAZINE HYDROCHLORIDE 10 MG: 20 INJECTION INTRAMUSCULAR; INTRAVENOUS at 00:01

## 2025-06-30 NOTE — PROGRESS NOTES
LOS: 11 days   Patient Care Team:  Nathan Zimmer MD as PCP - General (Internal Medicine)  Adrien Brewster MD as Consulting Physician (Gastroenterology)  Eleuterio Farley MD (Inactive) as Cardiologist (Cardiology)  Elena Jon APRN as Referring Physician (Vascular Surgery)  Bolivar Rueda MD as Consulting Physician (Nephrology)  Slava Tate APRN as Nurse Practitioner (Family Medicine)  New Omalley MD as Consulting Physician (Pulmonary Disease)  Becky Camacho APRN as Nurse Practitioner (Hematology and Oncology)  Gil Pineda DO as Consulting Physician (Vascular Surgery)  Malu Lozano, DEMETRIUS as Ambulatory  ([facility])    Chief Complaint: RLE rest pain and wounds    Subjective     Patient Complaints: Pain at right stump. No further bleeding over the weekend.     Objective     Vital Signs  Temp:  [97.2 °F (36.2 °C)-99.5 °F (37.5 °C)] 97.5 °F (36.4 °C)  Heart Rate:  [63-93] 93  Resp:  [16-17] 17  BP: (127-194)/(46-70) 127/47    NAD.   AFVSS.   R AKA dressing CDI. Incision CDI without bleeding or infection.     Laboratory Data:   Results from last 7 days   Lab Units 06/30/25  0402 06/29/25  0310 06/28/25  0405   WBC 10*3/mm3 12.42* 8.10 7.64   HEMOGLOBIN g/dL 8.5* 8.8* 8.3*   HEMATOCRIT % 26.6* 27.0* 25.0*   PLATELETS 10*3/mm3 135* 127* 117*       Results from last 7 days   Lab Units 06/30/25  0402 06/29/25  0310 06/28/25  0405 06/26/25  0531 06/25/25  0305   SODIUM mmol/L 133* 135* 133* 132* 135*   POTASSIUM mmol/L 4.9 4.4 4.4 4.1 4.4   CHLORIDE mmol/L 100 98 98 99 104   CO2 mmol/L 21.0* 26.0 28.0 22.0 19.0*   BUN mg/dL 52.4* 39.3* 23.6* 18.7 28.9*   CREATININE mg/dL 3.04* 2.77* 2.14* 1.98* 2.89*   CALCIUM mg/dL 8.7 8.7 8.3* 8.6 8.6   BILIRUBIN mg/dL 0.4  --   --  0.6 0.3   ALK PHOS U/L 206*  --   --  149* 127*   ALT (SGPT) U/L 19  --   --  <5 <5   AST (SGOT) U/L 38  --   --  34 24   GLUCOSE mg/dL 99 97 108* 113* 89                   Medication Review: Reviewed    Assessment & Plan       Vascular occlusion    Essential hypertension    Resistant hypertension    Peripheral arterial disease    Diastolic dysfunction    CKD (chronic kidney disease) stage 5    CLL (chronic lymphocytic leukemia)    Mixed hyperlipidemia    Eustachian tube dysfunction, bilateral    Sensorineural hearing loss (SNHL), bilateral    Chronic diastolic (congestive) heart failure    ESRD (end stage renal disease) on dialysis    Thrombocytopenia    Severe protein-calorie malnutrition    Chronic heart failure with preserved ejection fraction (HFpEF)    COPD (chronic obstructive pulmonary disease)          Plan for disposition:  75-year-old male with right lower extremity ischemic rest pain.  S/p R AKA on 6.24.25    -Pain controlled. Cont current regimen.   -Hgb stable after transfusions over the weekend. No further bleeding over the weekend.   -Resume plavix tomorrow.   -Keep wound clean and covered.  - and protector ordered.   -No further vascular intervention needed at this time.   -Pt to F/U in 3 weeks.   -DCP ongoing.   -Hospitalist admitting appreciate their assistance.          Blayne Zabala MD  Vascular Surgery  [phone]  06/30/25  10:02 CDT

## 2025-06-30 NOTE — PROGRESS NOTES
Nephrology ([facility]) Progress Note      Patient:  Ricardo Hugo  YOB: 1950  Date of Service: 6/30/2025  MRN: [id number]   Acct: [id number]   Primary Care Physician: Nathan Zimmer MD  Advance Directive:   Code Status and Medical Interventions: CPR (Attempt to Resuscitate); Full Support   Ordered at: 06/19/25 1944     Code Status (Patient has no pulse and is not breathing):    CPR (Attempt to Resuscitate)     Medical Interventions (Patient has pulse or is breathing):    Full Support     Admit Date: 6/19/2025       Hospital Day: 11  Referring Provider: No Known Provider      Patient personally seen and examined.  Complete chart including Consults, Notes, Operative Reports, Labs, Cardiology, and Radiology studies reviewed as able.    Chief complaint: Abnormal labs.    Subjective:  Ricardo Hugo is a 75 y.o. male  whom we were consulted for end-stage renal disease on maintenance dialysis.  Patient has permacatheter as a dialysis access.  He also has history of peripheral vascular disease, right carotid endarterectomy, PTA of the right external iliac artery, right SFA.  Presenting to the emergency room complaining of right leg pain.  His pain has progressively getting worse and has decided come to the emergency room.  Patient is currently being evaluated by Dr. Zabala for another arteriogram, look at the status of his blood flow to the right leg.  On June 28 patient underwent intravascular lithotripsy of popliteal artery, posterior tibial artery followed by balloon angioplasties.  Patient had no improvement of his right leg circulation despite interventions.  Finally he agreed to proceed with right AKA.  He underwent AKA June 24.    This afternoon patient feels well.  He is hoping to go to rehab/SNF.  He has tolerated his hemodialysis treatment today.      Allergies:  Ondansetron, Zofran [ondansetron hcl], Lortab [hydrocodone-acetaminophen], and Allopurinol    Home  Meds:  Medications Prior to Admission   Medication Sig Dispense Refill Last Dose/Taking    ALPRAZolam (XANAX) 0.25 MG tablet Take 1 tablet by mouth Every Night.   Taking    aspirin (aspirin) 81 MG EC tablet Take 1 tablet by mouth Daily.   Taking    atorvastatin (LIPITOR) 10 MG tablet Take 1 tablet by mouth Daily. 90 tablet 3 Taking    Budeson-Glycopyrrol-Formoterol (Breztri Aerosphere) 160-9-4.8 MCG/ACT aerosol inhaler Inhale 2 puffs 2 (Two) Times a Day.   Taking    calcitriol (ROCALTROL) 0.5 MCG capsule Take 1 capsule by mouth Daily. 90 capsule 2 Taking    carvedilol (COREG) 12.5 MG tablet Take 1 tablet by mouth 2 (Two) Times a Day With Meals.   Taking    clopidogrel (PLAVIX) 75 MG tablet Take 1 tablet by mouth Daily.   Taking    docusate sodium (COLACE) 100 MG capsule Take 1 capsule by mouth 3 (Three) Times a Day As Needed for Constipation.   Taking As Needed    empagliflozin (Jardiance) 10 MG tablet tablet Take 1 tablet by mouth Daily.   Taking    esomeprazole (nexIUM) 20 MG capsule Take 1 capsule by mouth Every Morning Before Breakfast.   Taking    gabapentin (NEURONTIN) 300 MG capsule Take 1 capsule by mouth 2 (Two) Times a Day.   Taking    hydrALAZINE (APRESOLINE) 100 MG tablet Take 1 tablet by mouth 3 (Three) Times a Day.   Taking    iron polysaccharides (NIFEREX) 150 MG capsule Take 1 capsule by mouth Daily.   Taking    isosorbide dinitrate (ISORDIL) 10 MG tablet Take 1 tablet by mouth 3 (Three) Times a Day. 270 tablet 2 Taking    levothyroxine (Synthroid) 100 MCG tablet Take 1 tablet by mouth Every Morning. 90 tablet 2 Taking    magnesium chloride ER 64 MG DR tablet Take 1 tablet by mouth Daily.   Taking    montelukast (SINGULAIR) 10 MG tablet Take 1 tablet by mouth Every Night.   Taking    Multiple Vitamins-Minerals (PRESERVISION/LUTEIN) capsule Take 1 capsule by mouth 2 (two) times a day.   Taking    NIFEdipine XL (ADALAT CC) 60 MG 24 hr tablet Take 2 tablets by mouth Daily.   Taking    spironolactone  (ALDACTONE) 25 MG tablet Take 1 tablet by mouth Daily.   Taking    tamsulosin (FLOMAX) 0.4 MG capsule 24 hr capsule Take 1 capsule by mouth Every Night.   Taking    valsartan (DIOVAN) 320 MG tablet Take 1 tablet by mouth Daily.   Taking    vitamin D (ERGOCALCIFEROL) 1.25 MG (56414 UT) capsule capsule Take 1 capsule by mouth 1 (One) Time Per Week. Mondays   Taking    albuterol sulfate  (90 Base) MCG/ACT inhaler Inhale 2 puffs Every 6 (Six) Hours As Needed for Wheezing or Shortness of Air.       bisacodyl (DULCOLAX) 10 MG suppository Insert 1 suppository into the rectum Daily As Needed for Constipation.       desoximetasone (TOPICORT) 0.25 % cream Apply 1 Application topically to the appropriate area as directed 2 (Two) Times a Day As Needed for Irritation. irritation 60 g 0     fluticasone (FLONASE) 50 MCG/ACT nasal spray Administer 2 sprays into the nostril(s) as directed by provider Daily As Needed for Allergies.       naloxone (NARCAN) 4 MG/0.1ML nasal spray Call 911. Don't prime. [place] in 1 nostril for overdose. Repeat in 2-3 minutes in other nostril if no or minimal breathing/responsiveness. 2 each 0     polyethylene glycol (MIRALAX) 17 g packet Take 17 g by mouth Daily As Needed (constipation).       senna 8.6 MG tablet Take 2 tablets by mouth Daily As Needed for Constipation.       [DISCONTINUED] cloNIDine (CATAPRES) 0.3 MG tablet Take 1 tablet by mouth 3 times a day. (Patient not taking: Reported on 6/21/2025)   Not Taking    [DISCONTINUED] gabapentin (NEURONTIN) 300 MG capsule Take 1 capsule by mouth Every Night. 24 capsule 0     [DISCONTINUED] omeprazole (priLOSEC) 20 MG capsule Take 1 capsule by mouth Daily. (Patient not taking: Reported on 6/21/2025)   Not Taking    [DISCONTINUED] oxyCODONE-acetaminophen (PERCOCET) 5-325 MG per tablet Take 1 tablet by mouth Every 6 (Six) Hours As Needed for Moderate Pain. 24 tablet 0        Medicines:  Current Facility-Administered Medications   Medication Dose  Route Frequency Provider Last Rate Last Admin    acetaminophen (TYLENOL) tablet 650 mg  650 mg Oral Q4H PRN Blayne Zabala MD   650 mg at 06/30/25 0924    Or    acetaminophen (TYLENOL) 160 MG/5ML oral solution 650 mg  650 mg Oral Q4H PRN Blayne Zabala MD        Or    acetaminophen (TYLENOL) suppository 650 mg  650 mg Rectal Q4H PRN Blayne Zabala MD        aspirin EC tablet 81 mg  81 mg Oral Daily Blayne Zabala MD   81 mg at 06/30/25 1223    atorvastatin (LIPITOR) tablet 10 mg  10 mg Oral Nightly Blayne Zabala MD   10 mg at 06/29/25 2022    sennosides-docusate (PERICOLACE) 8.6-50 MG per tablet 2 tablet  2 tablet Oral BID Blayne Zabala MD   2 tablet at 06/30/25 1223    And    polyethylene glycol (MIRALAX) packet 17 g  17 g Oral Daily PRN Blayne Zabala MD        And    bisacodyl (DULCOLAX) EC tablet 5 mg  5 mg Oral Daily PRN Blayne Zabala MD        And    bisacodyl (DULCOLAX) suppository 10 mg  10 mg Rectal Daily PRN Blayne Zabala MD        carvedilol (COREG) tablet 12.5 mg  12.5 mg Oral BID With Meals Jamie Pineda MD   12.5 mg at 06/30/25 1225    [Held by provider] clopidogrel (PLAVIX) tablet 75 mg  75 mg Oral Daily Blayne Zabala MD   75 mg at 06/27/25 1202    epoetin cecile-epbx (RETACRIT) injection 10,000 Units  10,000 Units Subcutaneous Once per day on Monday Wednesday Friday Blayne Zabala MD   10,000 Units at 06/30/25 1224    gabapentin (NEURONTIN) capsule 300 mg  300 mg Oral Nightly Blayne Zabala MD   300 mg at 06/29/25 2022    hydrALAZINE (APRESOLINE) injection 10 mg  10 mg Intravenous Q6H PRN Bouchra Trinidad MD   10 mg at 06/30/25 0001    [Held by provider] hydrALAZINE (APRESOLINE) tablet 100 mg  100 mg Oral TID Blayne Zabala MD   100 mg at 06/21/25 1941    iron polysaccharides (NIFEREX) capsule 150 mg  150 mg Oral Daily Garrett Comer MD   150 mg at 06/30/25 1502    isosorbide dinitrate (ISORDIL) tablet 10 mg  10 mg Oral TID - Nitrates VjBlayne MD   10  mg at 06/30/25 1223    levothyroxine (SYNTHROID, LEVOTHROID) tablet 100 mcg  100 mcg Oral Q AM Blayne Zabala MD   100 mcg at 06/30/25 0606    montelukast (SINGULAIR) tablet 10 mg  10 mg Oral Nightly Blayne Zabala MD   10 mg at 06/29/25 2022    Morphine sulfate (PF) injection 2 mg  2 mg Intravenous Q4H PRN Blayne Zabala MD   2 mg at 06/30/25 1003    naloxone (NARCAN) injection 0.4 mg  0.4 mg Intravenous Q5 Min PRN Blayne Zabala MD        NIFEdipine XL (PROCARDIA XL) 24 hr tablet 120 mg  120 mg Oral Daily Blayne Zabala MD   120 mg at 06/29/25 1807    nitroglycerin (NITROSTAT) SL tablet 0.4 mg  0.4 mg Sublingual Q5 Min PRN Blayne Zabala MD        oxyCODONE-acetaminophen (PERCOCET)  MG per tablet 1 tablet  1 tablet Oral Q6H PRN Jamie Pineda MD   1 tablet at 06/30/25 1504    oxyCODONE-acetaminophen (PERCOCET) 5-325 MG per tablet 1 tablet  1 tablet Oral Q4H PRN Blayne Zabala MD   1 tablet at 06/30/25 0606    promethazine (PHENERGAN) tablet 12.5 mg  12.5 mg Oral Q6H PRN Blayne Zabala MD   12.5 mg at 06/20/25 2243    sodium chloride 0.9 % flush 10 mL  10 mL Intravenous Q12H Blayne Zabala MD   10 mL at 06/30/25 1226    sodium chloride 0.9 % flush 10 mL  10 mL Intravenous PRN Blayne Zabala MD        sodium chloride 0.9 % infusion 40 mL  40 mL Intravenous PRN Blayne Zabala MD        [Held by provider] spironolactone (ALDACTONE) tablet 25 mg  25 mg Oral Daily Blayne Zabala MD   25 mg at 06/22/25 0905    tamsulosin (FLOMAX) 24 hr capsule 0.4 mg  0.4 mg Oral Nightly Blayne Zabala MD   0.4 mg at 06/29/25 2022    [Held by provider] valsartan (DIOVAN) tablet 320 mg  320 mg Oral Daily Blayne Zabala MD   320 mg at 06/22/25 0905       Past Medical History:  Past Medical History:   Diagnosis Date    3-vessel CAD 08/11/2020    Allergic rhinitis     Anemia     Anxiety disorder 04/27/2020    Arthritis     Asymmetrical sensorineural hearing loss 06/28/2017    Atherosclerosis of  native artery of both lower extremities with intermittent claudication 07/18/2019    Avascular necrosis of femoral head, left 07/11/2020    right hip after surgery    Carotid stenosis     Chronic mucoid otitis media     Chronic rhinitis     COPD (chronic obstructive pulmonary disease)     Coronary artery disease     HEART BYPASS 2004    Crohn's disease of large intestine with other complication 07/30/2020    Chronic diarrhea Colonoscopy July 2020 revealed mild patchy scattered hemosiderin staining with inflammation more so in rectosigmoid area.  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    Iron deficiency anemia 07/14/2020    Mixed hearing loss of left ear     PAD (peripheral artery disease) 08/11/2020    Perianal abscess     Pernicious anemia 08/17/2020    took shots but never diagnosed with b12 deficiency    Personal history of alcoholism 08/11/2020    quit drinking in 2013    Prostatic hypertrophy 08/11/2020    Sensorineural hearing loss     Sepsis with acute renal failure 09/15/2020    Shortness of breath 05/27/2021    Tinnitus     Ventricular tachycardia, nonsustained 07/14/2020    Weight loss 07/11/2020       Past Surgical History:  Past Surgical History:   Procedure Laterality Date    ABOVE KNEE AMPUTATION Right 6/24/2025    Procedure: right above knee amputation;  Surgeon: Blayne Zabala MD;  Location: [facility] OR;  Service: Vascular;  Laterality: Right;    AORTOGRAM Right 4/25/2025    Procedure: RIGHT LOWER EXTREMITY ANGIOGRAM, SHOCKWAVE LITHOTRIPSY, BALLOON ANGIOPLASTY, MYNX CLOSURE;  Surgeon: Gil Pineda DO;  Location:  [facility] HYBRID OR;   Service: Vascular;  Laterality: Right;    AORTOGRAM Left 5/22/2025    Procedure: LEFT LOWER EXTREMITY ANGIOGRAM, SHOCKWAVE LITHOTRIPSY, BALLOON ANGIOPLASTY, STENT PLACEMENT, MYNX CLOSURE;  Surgeon: Blayne Zabala MD;  Location: [facility] HYBRID OR;  Service: Vascular;  Laterality: Left;    AORTOGRAM Right 5/30/2025    Procedure: AORTOILIAC ANGIOGRAM WITH LEFT LOWER EXTREMITY ANGIOGRAM;  Surgeon: Gil Pineda DO;  Location: [facility] HYBRID OR;  Service: Vascular;  Laterality: Right;    AORTOGRAM Right 6/20/2025    Procedure: right lower extremity arteriogram, shockwave lithotripsy, balloon angioplasty, mynx closue;  Surgeon: Blayne Zabala MD;  Location: [facility] HYBRID OR;  Service: Vascular;  Laterality: Right;    ARTERY SURGERY  2021    right side on neck    CAROTID ENDARTERECTOMY Right 05/10/2021    Procedure: RIGHT CAROTID ENDARTERECTOMY WITH EEG;  Surgeon: Gil Pineda DO;  Location: [facility] HYBRID OR 12;  Service: Vascular;  Laterality: Right;    COLONOSCOPY N/A 07/02/2020    Procedure: COLONOSCOPY WITH ANESTHESIA;  Surgeon: Adrien Brewster MD;  Location: [facility] ENDOSCOPY;  Service: Gastroenterology;  Laterality: N/A;  pre op: diarrhea  post op: polyps  PCP: Joe Velasco MD    COLONOSCOPY N/A 10/13/2020    Procedure: COLONOSCOPY WITH ANESTHESIA;  Surgeon: Adrien Brewster MD;  Location: [facility] ENDOSCOPY;  Service: Gastroenterology;  Laterality: N/A;  Pre: Chronic Diarrhea, Crohn's  Post: AVM  Dr. Neftali Velasco  CO2 Inflation Used    COLONOSCOPY N/A 12/08/2023    Procedure: COLONOSCOPY WITH ANESTHESIA;  Surgeon: Adrien Brewster MD;  Location: [facility] ENDOSCOPY;  Service: Gastroenterology;  Laterality: N/A;  pre chrone's disease  post sub optimal prep, polyp, chrone's      CORONARY ARTERY BYPASS GRAFT  2003    x3    ENDOSCOPY N/A 11/02/2021    Procedure: ESOPHAGOGASTRODUODENOSCOPY WITH ANESTHESIA;  Surgeon: Bridger Bell MD;  Location: [facility] ENDOSCOPY;  Service:  Gastroenterology;  Laterality: N/A;  pre anemia;gi bleed  post  gi bleed;schatski ring  Dr. ERIC Velasco    ENDOSCOPY N/A 10/10/2023    Procedure: ESOPHAGOGASTRODUODENOSCOPY WITH ANESTHESIA;  Surgeon: Adrien Brewster MD;  Location: [facility] ENDOSCOPY;  Service: Gastroenterology;  Laterality: N/A;  preop; anemia  postop esophagitis ; R/O barretts   PCP Randall Beata    EYE SURGERY Bilateral     catorac    INCISION AND DRAINAGE PERIRECTAL ABSCESS N/A 2017    Procedure: INCISION AND DRAINAGE OF JEET ANAL ABSCESS;  Surgeon: Lynette Smith MD;  Location: [facility] OR;  Service:     INGUINAL HERNIA REPAIR Bilateral 2023    Procedure: INGUINAL HERNIA BILATERAL REPAIR LAPAROSCOPIC WITH DAVINCI ROBOT WITH MESH;  Surgeon: Tahira Rivera MD;  Location:  [facility] OR;  Service: Robotics - DaVinci;  Laterality: Bilateral;    INSERTION HEMODIALYSIS CATHETER N/A 2025    Procedure: HEMODIALYSIS CATHETER PLACEMENT;  Surgeon: Gil Pineda DO;  Location: [facility] HYBRID OR;  Service: Vascular;  Laterality: N/A;    MYRINGOTOMY W/ TUBES Left 2017    06/10/2016    TONSILLECTOMY      TOTAL HIP ARTHROPLASTY Right        Family History  Family History   Problem Relation Age of Onset    Breast cancer Mother     Dementia Father     Glaucoma Father     No Known Problems Daughter     Colon polyps Neg Hx     Colon cancer Neg Hx        Social History  Social History     Socioeconomic History    Marital status:    Tobacco Use    Smoking status: Former     Current packs/day: 0.00     Average packs/day: 0.5 packs/day for 25.8 years (12.9 ttl pk-yrs)     Types: Cigarettes     Start date:      Quit date: 10/13/2013     Years since quittin.7     Passive exposure: Past    Smokeless tobacco: Never    Tobacco comments:     quit 2013   Vaping Use    Vaping status: Former    Substances: Nicotine    Devices: Pre-filled or refillable cartridge   Substance and Sexual Activity    Alcohol use: Not Currently    Drug  use: No    Sexual activity: Not Currently     Partners: Female       Review of Systems:  History obtained from chart review and the patient  General ROS: No fever or chills  Respiratory ROS: No cough, shortness of breath, wheezing  Cardiovascular ROS: No chest pain or palpitations  Gastrointestinal ROS: No abdominal pain or melena  Genito-Urinary ROS: No dysuria or hematuria  Psych ROS: No anxiety and depression  14 point ROS reviewed with the patient and negative except as noted above and in the HPI unless unable to obtain.    Objective:  Patient Vitals for the past 24 hrs:   BP Temp Temp src Pulse Resp SpO2 Weight   06/30/25 1210 149/45 97.5 °F (36.4 °C) Oral 81 18 94 % --   06/30/25 0734 127/47 97.5 °F (36.4 °C) Oral 93 17 95 % --   06/30/25 0325 135/52 98.3 °F (36.8 °C) Oral 63 16 94 % 40.1 kg (88 lb 8 oz)   06/29/25 2330 (!) 181/66 99.1 °F (37.3 °C) Oral 74 16 93 % --   06/1950 (!) 194/70 99.5 °F (37.5 °C) Oral 77 16 95 % --   06/29/25 1807 (!) 188/54 97.3 °F (36.3 °C) Axillary 66 16 97 % --       Intake/Output Summary (Last 24 hours) at 6/30/2025 1518  Last data filed at 6/30/2025 1340  Gross per 24 hour   Intake 480 ml   Output 1625 ml   Net -1145 ml     General: awake/alert   HEENT: Normocephalic atraumatic head  Neck: Supple with no JVD or carotid bruits.  Chest:  clear to auscultation bilaterally without respiratory distress  CVS: regular rate and rhythm  Abdominal: soft, nontender, positive bowel sounds  Extremities: Right AKA.  Skin: warm and dry without rash      Labs:  Results from last 7 days   Lab Units 06/30/25  0402 06/29/25  0310 06/28/25  0405   WBC 10*3/mm3 12.42* 8.10 7.64   HEMOGLOBIN g/dL 8.5* 8.8* 8.3*   HEMATOCRIT % 26.6* 27.0* 25.0*   PLATELETS 10*3/mm3 135* 127* 117*         Results from last 7 days   Lab Units 06/30/25  0402 06/29/25  0310 06/28/25  0405 06/26/25  0531 06/25/25  0305   SODIUM mmol/L 133* 135* 133* 132* 135*   POTASSIUM mmol/L 4.9 4.4 4.4 4.1 4.4   CHLORIDE mmol/L  "100 98 98 99 104   CO2 mmol/L 21.0* 26.0 28.0 22.0 19.0*   BUN mg/dL 52.4* 39.3* 23.6* 18.7 28.9*   CREATININE mg/dL 3.04* 2.77* 2.14* 1.98* 2.89*   CALCIUM mg/dL 8.7 8.7 8.3* 8.6 8.6   EGFR mL/min/1.73 20.4* 22.8* 31.1* 34.2* 21.7*   BILIRUBIN mg/dL 0.4  --   --  0.6 0.3   ALK PHOS U/L 206*  --   --  149* 127*   ALT (SGPT) U/L 19  --   --  <5 <5   AST (SGOT) U/L 38  --   --  34 24   GLUCOSE mg/dL 99 97 108* 113* 89       Radiology:   Imaging Results (Last 72 Hours)       ** No results found for the last 72 hours. **            Culture:  No results found for: \"BLOODCX\", \"URINECX\", \"WOUNDCX\", \"MRSACX\", \"RESPCX\", \"STOOLCX\"      Assessment   1.  End-stage renal disease on maintenance dialysis.  2.  Hypertensive nephrosclerosis.  3.  Severe peripheral vascular disease, status post right AKA  4.  Anemia of chronic kidney disease.  5.  History of carotid occlusive disease    Plan:  1.  Tolerated dialysis well today.  2.  Continue ABILIO.  3.  Disposition to nursing home for long-term rehabilitation is pending.      Giuliano Saldana MD  6/30/2025  15:18 CDT    "

## 2025-06-30 NOTE — THERAPY TREATMENT NOTE
Acute Care - Physical Therapy Treatment Note  [facility]     Patient Name: Ricardo Hugo  : 1950  MRN: [id number]  Today's Date: 2025      Visit Dx:     ICD-10-CM ICD-9-CM   1. Vascular occlusion  I99.8 459.9   2. Arterial occlusion  I70.90 444.9   3. Dialysis patient  Z99.2 V45.11   4. Impaired mobility [Z74.09]  Z74.09 799.89   5. Severe protein-calorie malnutrition  E43 262   6. Thrombocytopenia  D69.6 287.5   7. Hyperkalemia  E87.5 276.7   8. Acute pain of left lower extremity  M79.605 729.5   9. Anemia, unspecified type  D64.9 285.9   10. Preop testing  Z01.818 V72.84   11. ESRD (end stage renal disease) on dialysis  N18.6 585.6    Z99.2 V45.11   12. Abnormal TSH  R79.89 790.6   13. History of pneumonia, current treatment with cefdinir  Z87.01 V12.61   14. Bradycardia  R00.1 427.89   15. Chronic diastolic (congestive) heart failure  I50.32 428.32     428.0   16. Persistent proteinuria  R80.1 791.0   17. Dermatitis associated with moisture  L30.8 692.89   18. Sleep difficulties  G47.9 780.50   19. Bilateral inguinal hernia without obstruction or gangrene, recurrence not specified  K40.20 550.92   20. Unilateral recurrent inguinal hernia without obstruction or gangrene  K40.91 550.91   21. Hypertrophy of inferior nasal turbinate  J34.3 478.0   22. Nasal septal deviation  J34.2 470   23. Anticoagulated  Z79.01 V58.61   24. Sensorineural hearing loss (SNHL), bilateral  H90.3 389.18   25. Eustachian tube dysfunction, bilateral  H69.93 381.81   26. Systolic murmur  R01.1 785.2   27. Mixed hyperlipidemia  E78.2 272.2   28. Perforation of left tympanic membrane  H72.92 384.20   29. CLL (chronic lymphocytic leukemia)  C91.10 204.10   30. Low iron   E61.1 280.9   31. Copper deficiency  E61.0 275.1   32. Anemia due to chronic kidney disease, on chronic dialysis  N18.6 585.6    D63.1 285.21    Z99.2 V45.11   33. CKD (chronic kidney disease) stage 5  N18.4 585.4   34. Diastolic dysfunction  I51.89 429.9    35. Carotid stenosis, right  I65.21 433.10   36. Stenosis of right carotid artery  I65.21 433.10   37. Bilateral carotid artery stenosis  I65.23 433.10     433.30   38. IHD (ischemic heart disease)  I25.9 414.9   39. Peripheral arterial disease  I73.9 443.9   40. Wellness examination  Z00.00 V70.0   41. Symptomatic anemia  D64.9 285.9   42. Chronic diarrhea  K52.9 787.91   43. Resistant hypertension  I1A.0 401.9   44. Essential hypertension  I10 401.9   45. Chronic rhinitis  J31.0 472.0     Patient Active Problem List   Diagnosis    Chronic rhinitis    Essential hypertension    Resistant hypertension    Chronic diarrhea    Symptomatic anemia    Wellness examination    Peripheral arterial disease    IHD (ischemic heart disease)    Carotid stenosis    Stenosis of right carotid artery    Carotid stenosis, right    Diastolic dysfunction    CKD (chronic kidney disease) stage 5    Anemia due to chronic kidney disease    Copper deficiency    Low iron     CLL (chronic lymphocytic leukemia)    Perforation of left tympanic membrane    Mixed hyperlipidemia    Systolic murmur    Eustachian tube dysfunction, bilateral    Sensorineural hearing loss (SNHL), bilateral    Anticoagulated    Nasal septal deviation    Hypertrophy of inferior nasal turbinate    Unilateral recurrent inguinal hernia without obstruction or gangrene    Bilateral inguinal hernia without obstruction or gangrene    Sleep difficulties    Dermatitis associated with moisture    Proteinuria    Chronic diastolic (congestive) heart failure    Bradycardia    History of pneumonia, current treatment with cefdinir    Abnormal TSH    ESRD (end stage renal disease) on dialysis    Preop testing    Anemia, multifactorial to include chronic kidney disease, iron deficiency and possible bleed    Acute pain of left lower extremity    Hyperkalemia    Arterial occlusion    Thrombocytopenia    Severe protein-calorie malnutrition    Chronic heart failure with preserved ejection  fraction (HFpEF)    Vascular occlusion    COPD (chronic obstructive pulmonary disease)     Past Medical History:   Diagnosis Date    3-vessel CAD 08/11/2020    Allergic rhinitis     Anemia     Anxiety disorder 04/27/2020    Arthritis     Asymmetrical sensorineural hearing loss 06/28/2017    Atherosclerosis of native artery of both lower extremities with intermittent claudication 07/18/2019    Avascular necrosis of femoral head, left 07/11/2020    right hip after surgery    Carotid stenosis     Chronic mucoid otitis media     Chronic rhinitis     COPD (chronic obstructive pulmonary disease)     Coronary artery disease     HEART BYPASS 2004    Crohn's disease of large intestine with other complication 07/30/2020    Chronic diarrhea Colonoscopy July 2020 revealed mild patchy scattered hemosiderin staining with inflammation more so in rectosigmoid area.  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    Iron deficiency anemia 07/14/2020    Mixed hearing loss of left ear     PAD (peripheral artery disease) 08/11/2020    Perianal abscess     Pernicious anemia 08/17/2020    took shots but never diagnosed with b12 deficiency    Personal history of alcoholism 08/11/2020    quit drinking in 2013    Prostatic hypertrophy 08/11/2020    Sensorineural hearing loss     Sepsis with acute renal failure 09/15/2020    Shortness of breath 05/27/2021    Tinnitus     Ventricular tachycardia, nonsustained 07/14/2020    Weight loss 07/11/2020     Past Surgical History:   Procedure Laterality Date    ABOVE KNEE AMPUTATION Right 6/24/2025    Procedure: right above knee amputation;  Surgeon:  Blayne Zabala MD;  Location: [facility] OR;  Service: Vascular;  Laterality: Right;    AORTOGRAM Right 4/25/2025    Procedure: RIGHT LOWER EXTREMITY ANGIOGRAM, SHOCKWAVE LITHOTRIPSY, BALLOON ANGIOPLASTY, MYNX CLOSURE;  Surgeon: Gil Pineda DO;  Location: [facility] HYBRID OR;  Service: Vascular;  Laterality: Right;    AORTOGRAM Left 5/22/2025    Procedure: LEFT LOWER EXTREMITY ANGIOGRAM, SHOCKWAVE LITHOTRIPSY, BALLOON ANGIOPLASTY, STENT PLACEMENT, MYNX CLOSURE;  Surgeon: Blayne Zabala MD;  Location: [facility] HYBRID OR;  Service: Vascular;  Laterality: Left;    AORTOGRAM Right 5/30/2025    Procedure: AORTOILIAC ANGIOGRAM WITH LEFT LOWER EXTREMITY ANGIOGRAM;  Surgeon: Gil Pineda DO;  Location: [facility] HYBRID OR;  Service: Vascular;  Laterality: Right;    AORTOGRAM Right 6/20/2025    Procedure: right lower extremity arteriogram, shockwave lithotripsy, balloon angioplasty, mynx closue;  Surgeon: Blayne Zabala MD;  Location: [facility] HYBRID OR;  Service: Vascular;  Laterality: Right;    ARTERY SURGERY  2021    right side on neck    CAROTID ENDARTERECTOMY Right 05/10/2021    Procedure: RIGHT CAROTID ENDARTERECTOMY WITH EEG;  Surgeon: Gil Pineda DO;  Location: [facility] HYBRID OR 12;  Service: Vascular;  Laterality: Right;    COLONOSCOPY N/A 07/02/2020    Procedure: COLONOSCOPY WITH ANESTHESIA;  Surgeon: Adrien Brewster MD;  Location: [facility] ENDOSCOPY;  Service: Gastroenterology;  Laterality: N/A;  pre op: diarrhea  post op: polyps  PCP: Joe Velasco MD    COLONOSCOPY N/A 10/13/2020    Procedure: COLONOSCOPY WITH ANESTHESIA;  Surgeon: Adrien Brewster MD;  Location: [facility] ENDOSCOPY;  Service: Gastroenterology;  Laterality: N/A;  Pre: Chronic Diarrhea, Crohn's  Post: AVM  Dr. Neftali Velasco  CO2 Inflation Used    COLONOSCOPY N/A 12/08/2023    Procedure: COLONOSCOPY WITH ANESTHESIA;  Surgeon: Adrien Brewster MD;  Location: [facility] ENDOSCOPY;  Service: Gastroenterology;  Laterality: N/A;  pre  chrone's disease  post sub optimal prep, polyp, chrone's      CORONARY ARTERY BYPASS GRAFT  2003    x3    ENDOSCOPY N/A 11/02/2021    Procedure: ESOPHAGOGASTRODUODENOSCOPY WITH ANESTHESIA;  Surgeon: Bridger Bell MD;  Location: [facility] ENDOSCOPY;  Service: Gastroenterology;  Laterality: N/A;  pre anemia;gi bleed  post  gi bleed;schatski ring  Dr. ERIC Velasco    ENDOSCOPY N/A 10/10/2023    Procedure: ESOPHAGOGASTRODUODENOSCOPY WITH ANESTHESIA;  Surgeon: Adrien Brewster MD;  Location: [facility] ENDOSCOPY;  Service: Gastroenterology;  Laterality: N/A;  preop; anemia  postop esophagitis ; R/O barretts   PCP Randall Beata    EYE SURGERY Bilateral     catorac    INCISION AND DRAINAGE PERIRECTAL ABSCESS N/A 03/03/2017    Procedure: INCISION AND DRAINAGE OF JEET ANAL ABSCESS;  Surgeon: Lynette Smith MD;  Location: [facility] OR;  Service:     INGUINAL HERNIA REPAIR Bilateral 06/27/2023    Procedure: INGUINAL HERNIA BILATERAL REPAIR LAPAROSCOPIC WITH DAVINCI ROBOT WITH MESH;  Surgeon: Tahira Rivera MD;  Location: [facility] OR;  Service: Robotics - DaVinci;  Laterality: Bilateral;    INSERTION HEMODIALYSIS CATHETER N/A 4/16/2025    Procedure: HEMODIALYSIS CATHETER PLACEMENT;  Surgeon: Gil Pineda DO;  Location: [facility] HYBRID OR;  Service: Vascular;  Laterality: N/A;    MYRINGOTOMY W/ TUBES Left 04/17/2017    06/10/2016    TONSILLECTOMY      TOTAL HIP ARTHROPLASTY Right 2006     PT Assessment (Last 12 Hours)       PT Evaluation and Treatment       Row Name 06/30/25 1410          Physical Therapy Time and Intention    Subjective Information complains of;pain  -LY     Document Type therapy note (daily note)  -LY     Mode of Treatment physical therapy  -LY       Row Name 06/30/25 1410          General Information    Existing Precautions/Restrictions fall  R AKA  -LY       Row Name 06/30/25 1410          Pain    Pretreatment Pain Rating 8/10  -LY     Posttreatment Pain Rating 10/10  -LY     Pain Location  residual limb  -LY     Pain Side/Orientation right;lower  -LY     Pain Management Interventions exercise or physical activity utilized  -LY     Response to Pain Interventions activity participation with increased pain  -LY       Row Name 06/30/25 1410          Bed Mobility    Bed Mobility rolling left;rolling right  -LY     Rolling Left [place] (Bed Mobility) verbal cues;minimum assist (75% patient effort)  -LY     Rolling Right [place] (Bed Mobility) verbal cues;minimum assist (75% patient effort)  -LY     Scooting/Bridging [place] (Bed Mobility) dependent (less than 25% patient effort)  -LY     Supine-Sit [place] (Bed Mobility) minimum assist (75% patient effort);moderate assist (50% patient effort);verbal cues  -LY     Sit-Supine [place] (Bed Mobility) verbal cues;moderate assist (50% patient effort)  -LY     Assistive Device (Bed Mobility) repositioning sheet;head of bed elevated;bed rails  -LY       Row Name 06/30/25 1410          Balance    Comment, Balance worked on reaching outside of midline, only able to perform x2 rep d/t pain  -LY       Row Name 06/30/25 1410          Motor Skills    Comments, Therapeutic Exercise LLE AROM seated x10  -LY       Row Name             Residual Limb Assessment 06/24/25 1900 transfemoral (above knee), right    Residual Limb Assessment - Properties Group Placement Date: 06/24/25  - Placement Time: 1900  -JR Amputation Date: 06/24/25  -JR Location: transfemoral (above knee), right  -JR    Retired Residual Limb Assessment - Properties Group Placement Date: 06/24/25  -JR Placement Time: 1900  -JR Amputation Date: 06/24/25  -JR Location: transfemoral (above knee), right  -JR    Retired Residual Limb Assessment - Properties Group Placement Date: 06/24/25  -JR Placement Time: 1900  -JR Amputation Date: 06/24/25  -JR Location: transfemoral (above knee), right  -JR    Retired Residual Limb Assessment - Properties Group Date first assessed: 06/24/25  - Time  first assessed: 1900  -JR Amputation Date: 06/24/25  -JR Location: transfemoral (above knee), right  -JR      Row Name             Wound 05/21/25 2311 Bilateral sacral spine     Wound - Properties Group Placement Date: 05/21/25  -SB Placement Time: 2311  -SB Present on Original Admission: Y  -SB Side: Bilateral  -SB Location: sacral spine  -SB Primary Wound Type: --  -SB, nonblanchable discoloration     Retired Wound - Properties Group Placement Date: 05/21/25  -SB Placement Time: 2311  -SB Present on Original Admission: Y  -SB Side: Bilateral  -SB Location: sacral spine  -SB    Retired Wound - Properties Group Placement Date: 05/21/25  -SB Placement Time: 2311  -SB Present on Original Admission: Y  -SB Side: Bilateral  -SB Location: sacral spine  -SB    Retired Wound - Properties Group Date first assessed: 05/21/25  -SB Time first assessed: 2311  -SB Present on Original Admission: Y  -SB Side: Bilateral  -SB Location: sacral spine  -SB      Row Name             Wound 06/19/25 2024 Left anterior plantar    Wound - Properties Group Placement Date: 06/19/25  -BR Placement Time: 2024  -BR Side: Left  -BR Orientation: anterior  -BR Location: plantar  -BR    Retired Wound - Properties Group Placement Date: 06/19/25  -BR Placement Time: 2024 -BR Side: Left  -BR Orientation: anterior  -BR Location: plantar  -BR    Retired Wound - Properties Group Placement Date: 06/19/25  -BR Placement Time: 2024  -BR Side: Left  -BR Orientation: anterior  -BR Location: plantar  -BR    Retired Wound - Properties Group Date first assessed: 06/19/25  -BR Time first assessed: 2024  -BR Side: Left  -BR Location: plantar  -BR      Row Name             Wound 06/20/25 1656 Left anterior groin Surgical Closed Surgical Incision    Wound - Properties Group Placement Date: 06/20/25  -AC Placement Time: 1656  -AC Present on Original Admission: N  -AC Side: Left  -AC Orientation: anterior  -AC Location: groin  -AC Primary Wound Type: Surgical  -AC  Secondary Wound Type - Surgical: Closed Surgi  -AC Additional Comments: mynx 2x2 tegaderm  -AC    Retired Wound - Properties Group Placement Date: 06/20/25  -AC Placement Time: 1656  -AC Present on Original Admission: N  -AC Side: Left  -AC Orientation: anterior  -AC Location: groin  -AC Additional Comments: mynx 2x2 tegaderm  -AC    Retired Wound - Properties Group Placement Date: 06/20/25  -AC Placement Time: 1656  -AC Present on Original Admission: N  -AC Side: Left  -AC Orientation: anterior  -AC Location: groin  -AC Additional Comments: mynx 2x2 tegaderm  -AC    Retired Wound - Properties Group Date first assessed: 06/20/25  -AC Time first assessed: 1656  -AC Present on Original Admission: N  -AC Side: Left  -AC Location: groin  -AC Additional Comments: mynx 2x2 tegaderm  -AC      Row Name             Wound 06/24/25 1258 Right distal thigh Surgical Closed Surgical Incision    Wound - Properties Group Placement Date: 06/24/25  -CB Placement Time: 1258  -CB Present on Original Admission: N  -CB Side: Right  -CB Orientation: distal  -CB Location: thigh  -CB Primary Wound Type: Surgical  -CB Secondary Wound Type - Surgical: Closed Surgi  -CB    Retired Wound - Properties Group Placement Date: 06/24/25  -CB Placement Time: 1258  -CB Present on Original Admission: N  -CB Side: Right  -CB Orientation: distal  -CB Location: thigh  -CB    Retired Wound - Properties Group Placement Date: 06/24/25  -CB Placement Time: 1258  -CB Present on Original Admission: N  -CB Side: Right  -CB Orientation: distal  -CB Location: thigh  -CB    Retired Wound - Properties Group Date first assessed: 06/24/25  -CB Time first assessed: 1258  -CB Present on Original Admission: N  -CB Side: Right  -CB Location: thigh  -CB      Row Name 06/30/25 1410          Plan of Care Review    Plan of Care Reviewed With patient  -LY     Progress improving  -LY     Outcome Evaluation PT tx completed. Pt just returned from dialysis. Reports increased pain,  however, willing to try mobility. Requires min/mod x1 and increased time for supine to sit with use of slide pad. Once EOB demos a L lateral lean d/t pain in RLE pain. Completed LLE AROM while seated and performed x2 reps of reaching with RUE ouside of CHAZ. Pt requests to return to bed d/t pain level increasing. Required mod x1 to complete. Rolled L and R with min x1 for pad change. Will cont to follow.  -LY       Row Name 06/30/25 1410          Positioning and Restraints    Pre-Treatment Position in bed  -LY     Post Treatment Position bed  -LY     In Bed fowlers;call light within reach;encouraged to call for assist;with family/caregiver  -LY               User Key  (r) = Recorded By, (t) = Taken By, (c) = Cosigned By      Initials Name Provider Type    AC Emilia Kline, RN Registered Nurse    Jocelynn Londono, PTA Physical Therapist Assistant    Jeni Nathan, RN Registered Nurse    Francisco Lieberman, RN Registered Nurse    Dorian Cote RN Registered Nurse    Doug Brito RN Registered Nurse                    Physical Therapy Education       Title: PT OT SLP Therapies (In Progress)       Topic: Physical Therapy (In Progress)       Point: Mobility training (In Progress)       Learning Progress Summary            Patient Acceptance, E, NR by EM at 6/29/2025 1532    Acceptance, E, NR by EM at 6/28/2025 1726    Acceptance, E, NR,VU by AD at 6/26/2025 1436    Comment: POC, d/c plans, body mechanics, positioning techniques   Family Acceptance, E, NR by EM at 6/29/2025 1532    Acceptance, E, NR,VU by AD at 6/26/2025 1436    Comment: POC, d/c plans, body mechanics, positioning techniques                      Point: Home exercise program (In Progress)       Learning Progress Summary            Patient Acceptance, E, NR by EM at 6/29/2025 1532    Acceptance, E, NR by EM at 6/28/2025 1726    Acceptance, E, NR,VU by AD at 6/26/2025 1436    Comment: POC, d/c plans, body mechanics, positioning  techniques   Family Acceptance, E, NR by EM at 6/29/2025 1532    Acceptance, E, NR,VU by AD at 6/26/2025 1436    Comment: POC, d/c plans, body mechanics, positioning techniques                      Point: Body mechanics (In Progress)       Learning Progress Summary            Patient Acceptance, E, NR by EM at 6/29/2025 1532    Acceptance, E, NR by EM at 6/28/2025 1726    Acceptance, E, NR,VU by AD at 6/26/2025 1436    Comment: POC, d/c plans, body mechanics, positioning techniques   Family Acceptance, E, NR by EM at 6/29/2025 1532    Acceptance, E, NR,VU by AD at 6/26/2025 1436    Comment: POC, d/c plans, body mechanics, positioning techniques                      Point: Precautions (In Progress)       Learning Progress Summary            Patient Acceptance, E, NR by EM at 6/29/2025 1532    Acceptance, E, NR by EM at 6/28/2025 1726    Acceptance, E, NR,VU by AD at 6/26/2025 1436    Comment: POC, d/c plans, body mechanics, positioning techniques   Family Acceptance, E, NR by EM at 6/29/2025 1532    Acceptance, E, NR,VU by AD at 6/26/2025 1436    Comment: POC, d/c plans, body mechanics, positioning techniques                                      User Key       Initials Effective Dates Name Provider Type Discipline    AD 04/21/25 -  Yon Magaña PT Student PT Student PT    EM 06/06/25 -  Felipa Mckenzie, RN Registered Nurse Nurse                  PT Recommendation and Plan     Plan of Care Reviewed With: patient  Progress: improving  Outcome Evaluation: PT tx completed. Pt just returned from dialysis. Reports increased pain, however, willing to try mobility. Requires min/mod x1 and increased time for supine to sit with use of slide pad. Once EOB demos a L lateral lean d/t pain in RLE pain. Completed LLE AROM while seated and performed x2 reps of reaching with RUE ouside of CHAZ. Pt requests to return to bed d/t pain level increasing. Required mod x1 to complete. Rolled L and R with min x1 for pad change. Will cont  to follow.   Outcome Measures       Row Name 06/28/25 1500             How much help from another person do you currently need...    Turning from your back to your side while in flat bed without using bedrails? 3  -AH      Moving from lying on back to sitting on the side of a flat bed without bedrails? 3  -AH      Moving to and from a bed to a chair (including a wheelchair)? 1  -AH      Standing up from a chair using your arms (e.g., wheelchair, bedside chair)? 1  -AH      Climbing 3-5 steps with a railing? 1  -AH      To walk in hospital room? 1  -AH      AM-PAC 6 Clicks Score (PT) 10  -         Functional Assessment    Outcome Measure Options AM-PAC 6 Clicks Basic Mobility (PT)  -AH                User Key  (r) = Recorded By, (t) = Taken By, (c) = Cosigned By      Initials Name Provider Type     Arti Quinonez PTA Physical Therapist Assistant                     Time Calculation:    PT Charges       Row Name 06/30/25 1457             Time Calculation    Start Time 1410  -LY      Stop Time 1440  -LY      Time Calculation (min) 30 min  -LY      PT Received On 06/30/25  -LY         Time Calculation- PT    Total Timed Code Minutes- PT 30 minute(s)  -LY         Timed Charges    93865 - PT Therapeutic Exercise Minutes 12  -LY      37594 - PT Therapeutic Activity Minutes 18  -LY         Total Minutes    Timed Charges Total Minutes 30  -LY       Total Minutes 30  -LY                User Key  (r) = Recorded By, (t) = Taken By, (c) = Cosigned By      Initials Name Provider Type    LY Jocelynn Fabian PTA Physical Therapist Assistant                  Therapy Charges for Today       Code Description Service Date Service Provider Modifiers Qty    54711311575 HC PT THER PROC EA 15 MIN 6/30/2025 Jocelynn Fabian PTA GP 1    27008267529 HC PT THERAPEUTIC ACT EA 15 MIN 6/30/2025 Jocelynn Fabian PTA GP 1            PT G-Codes  Outcome Measure Options: AM-PAC 6 Clicks Basic Mobility (PT)  AM-PAC 6 Clicks Score (PT): 10    Jocelynn  GRACIA Fabian, PTA  6/30/2025

## 2025-06-30 NOTE — CASE MANAGEMENT/SOCIAL WORK
Continued Stay Note   Nino     Patient Name: Ricardo Hugo  MRN: [id number]  Today's Date: 6/30/2025    Admit Date: 6/19/2025    Plan: [facility]   Discharge Plan       Row Name 06/30/25 1413       Plan    Plan [facility]    Patient/Family in Agreement with Plan yes    Plan Comments Continuing to follow. Pt has a bed at [facility] and will d/c there at d/c.                   Discharge Codes    No documentation.                 Expected Discharge Date and Time       Expected Discharge Date Expected Discharge Time    Jun 30, 2025               LINNETTE Ramos

## 2025-06-30 NOTE — PROGRESS NOTES
[facility] Medicine Services  INPATIENT PROGRESS NOTE    Patient Name: Ricardo Hugo  Date of Admission: 6/19/2025  Today's Date: 06/30/25  Length of Stay: 11  Primary Care Physician: Nathan Zimmer MD    Subjective   Chief Complaint: Peripheral vascular disease.   HPI   Blood pressure slightly high but stable, afebrile.  Sodium stable.  Slight increase in creatinine.  Elevated TSH, free T4 pending.  Leukocytosis noted.  Hemoglobin stable.  Increase in platelets.    Review of Systems   Constitutional:  Positive for activity change, appetite change and fatigue. Negative for chills and fever.   HENT:  Negative for hearing loss, nosebleeds, tinnitus and trouble swallowing.    Eyes:  Negative for visual disturbance.   Respiratory:  Negative for cough, chest tightness, shortness of breath and wheezing.    Cardiovascular:  Negative for chest pain, palpitations and leg swelling.   Gastrointestinal:  Negative for abdominal distention, abdominal pain, blood in stool, constipation, diarrhea, nausea and vomiting.   Endocrine: Negative for cold intolerance, heat intolerance, polydipsia, polyphagia and polyuria.   Genitourinary:  Negative for decreased urine volume, difficulty urinating, dysuria, flank pain, frequency and hematuria.   Musculoskeletal:  Positive for arthralgias, gait problem and myalgias. Negative for joint swelling.   Skin:  Negative for rash.   Allergic/Immunologic: Negative for immunocompromised state.   Neurological:  Positive for weakness. Negative for dizziness, syncope, light-headedness and headaches.   Hematological:  Negative for adenopathy. Does not bruise/bleed easily.   Psychiatric/Behavioral:  Negative for confusion and sleep disturbance. The patient is not nervous/anxious.    All pertinent negatives and positives are as above. All other systems have been reviewed and are negative unless otherwise stated.     Objective    Temp:  [97.3 °F (36.3 °C)-99.5 °F  (37.5 °C)] 97.5 °F (36.4 °C)  Heart Rate:  [63-93] 81  Resp:  [16-18] 18  BP: (127-194)/(45-70) 149/45  Physical Exam  Vitals and nursing note reviewed.   Constitutional:       Comments: Advanced age.  Chronically ill.  Cachectic.   HENT:      Head: Normocephalic.   Eyes:      Conjunctiva/sclera: Conjunctivae normal.      Pupils: Pupils are equal, round, and reactive to light.   Cardiovascular:      Rate and Rhythm: Normal rate and regular rhythm.      Heart sounds: Normal heart sounds.   Pulmonary:      Effort: Pulmonary effort is normal. No respiratory distress.      Breath sounds: Normal breath sounds.      Comments: On room air.  Abdominal:      General: Bowel sounds are normal. There is no distension.      Palpations: Abdomen is soft.      Tenderness: There is no abdominal tenderness.   Musculoskeletal:         General: No swelling.      Cervical back: Neck supple.      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   Skin:     General: Skin is warm and dry.      Findings: No rash.   Neurological:      Mental Status: He is alert and oriented to person, place, and time.      Motor: Weakness present.      Coordination: Coordination abnormal.      Gait: Gait abnormal.   Psychiatric:         Mood and Affect: Mood normal.         Behavior: Behavior normal.         Thought Content: Thought content normal.          Results Review:  I have reviewed the labs, radiology results, and diagnostic studies.    Laboratory Data:   Results from last 7 days   Lab Units 06/30/25  0402 06/29/25  0310 06/28/25  0405   WBC 10*3/mm3 12.42* 8.10 7.64   HEMOGLOBIN g/dL 8.5* 8.8* 8.3*   HEMATOCRIT % 26.6* 27.0* 25.0*   PLATELETS 10*3/mm3 135* 127* 117*        Results from last 7 days   Lab Units 06/30/25  0402 06/29/25  0310 06/28/25  0405 06/26/25  0531 06/25/25  0305   SODIUM mmol/L 133* 135* 133* 132* 135*   POTASSIUM mmol/L 4.9 4.4 4.4 4.1 4.4   CHLORIDE mmol/L 100 98 98 99 104   CO2 mmol/L 21.0*  "26.0 28.0 22.0 19.0*   BUN mg/dL 52.4* 39.3* 23.6* 18.7 28.9*   CREATININE mg/dL 3.04* 2.77* 2.14* 1.98* 2.89*   CALCIUM mg/dL 8.7 8.7 8.3* 8.6 8.6   BILIRUBIN mg/dL 0.4  --   --  0.6 0.3   ALK PHOS U/L 206*  --   --  149* 127*   ALT (SGPT) U/L 19  --   --  <5 <5   AST (SGOT) U/L 38  --   --  34 24   GLUCOSE mg/dL 99 97 108* 113* 89       Culture Data:   No results found for: \"BLOODCX\", \"URINECX\", \"WOUNDCX\", \"MRSACX\", \"RESPCX\", \"STOOLCX\"    Radiology Data:   Imaging Results (Last 24 Hours)       ** No results found for the last 24 hours. **            I have reviewed the patient's current medications.     Assessment/Plan   Assessment  Active Hospital Problems    Diagnosis     **Vascular occlusion     COPD (chronic obstructive pulmonary disease)     Severe protein-calorie malnutrition     Chronic heart failure with preserved ejection fraction (HFpEF)     Thrombocytopenia     ESRD (end stage renal disease) on dialysis     Chronic diastolic (congestive) heart failure     Sensorineural hearing loss (SNHL), bilateral     Eustachian tube dysfunction, bilateral     Mixed hyperlipidemia     CLL (chronic lymphocytic leukemia)     CKD (chronic kidney disease) stage 5     Diastolic dysfunction     Resistant hypertension     Peripheral arterial disease     Essential hypertension        Treatment Plan  Vascular occlusion - With intervention on 6/22 right popliteal artery and peroneal artery.  Also had PTA to the right posterior tibial artery.  Was on heparin drip preprocedure. Post procedure on aspirin, Plavix, Lipitor.  Unfortunately despite attempts at revascularization patient continued to have PAD with poor blood flow to his lower extremity.  Vascular consult.  Leukocytosis.  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  Plan- Evaluation on Monday for DC to SNF.  Plan on reassessing wound with bandage takedown on Monday 6/30. Patient likely okay to go back to facility at that point if stable.  Vascular recommendation-resume Plavix " tomorrow.  Follow-up in 3 weeks.  Keep wound clean and covered.   and protector ordered.     ESRD -continue HD as scheduled.  Nephrology following.  Creatinine slight increase.     Chronic diastolic CHF/hypertension-appears euvolemic/hyperlipidemia.  No signs of exacerbation.  Aspirin . Lipitor.  Coreg . Isordil.  Nifedipine.  Hold Aldactone . Valsartan.  Hydralazine on hold.  Clonidine was discontinued.  Plavix was held.  Procardia .     Chronic constipation -continue bowel regimen.       Anemia of chronic disease -monitor closely.  Received 2 uprbc 6/27/2025.  Hemoglobin stable.  Start Niferex. Procrit .     Thrombocytopenia.  Platelet counts increased.     COPD/no exacerbation.  Singulair.  On room air.     Neuropathy.  Neurontin nightly.     Hypothyroidism.  Synthroid.  Elevated TSH.  Free T4 pending.     Hyponatremia.  Sodium stable     Nausea.  Phenergan as needed.     Constipation.  Giana-Colace.     Prostate hypertrophy.  Flomax.     Advanced age.  75 years old     Pain.  Percocet as needed.  Narcan as needed.     Nutrition . Regular diet.  Boost supplement.     Deconditioning.  PT consult.     Patient is from rehab.     Medical Decision Making  Number and Complexity of problems: Vascular occlusion/dialysis/CHF/chronic constipation/anemia  Differential Diagnosis: None     Conditions and Status        Condition is unchanged.     [facility] Data  External documents reviewed: Previous note .  Cardiac tracing (EKG, telemetry) interpretation: Sinus .  Radiology interpretation: None  Labs reviewed: Laboratory  Any tests that were considered but not ordered: Lab in a.m.     Decision rules/scores evaluated (example [id number], Wells, etc): None     Discussed with: Patient     Care Planning  Shared decision making: Patient  Code status and discussions: Full code     Disposition  Social Determinants of Health that impact treatment or disposition: From rehab  1 to 3 days .              Electronically signed by Garrett  SHANNON Comer MD, 06/30/25, 13:42 CDT.

## 2025-06-30 NOTE — PLAN OF CARE
Goal Outcome Evaluation:  Plan of Care Reviewed With: patient        Progress: no change  Outcome Evaluation: Pt resting well this shift. C/o pain x1, see MAR. Turning q2h when pt allows. Voiding via urinal. PRN hydralazine x1 for . AKA ace wrap c/d/i/. Hemodialysis cath c/d/i, dialysis today. Safety maintained.

## 2025-06-30 NOTE — PROGRESS NOTES
1 L removed. CVC flushed with NS. Pt stable.      [facility] INPATIENT SERVICES  DIALYSIS TREATMENT SUMMARY     Note: Consult with the attending physician for patient treatment orders, this document is not a physician order.     Patient Information  Patient Ricardo Hugo  Date of Birth February 22, 1950  Chart Number [id number]  Location [facility]  Location N [id number]  Gender Male  SSN (last 4)   Treatment Information  Treatment Type Hemodialysis  Treatment Id [id number]  Start Time June 30, 2025 07:51  End Time June 30, 2025 11:39  Acutal Duration 03:48  Treatment Balances  Total Saline Administered 500  Net Fluid Balance -1000  Hemodialysis Orders  Therapy Standard  Orders Verified Time 06/30/2025 07:40   Date Verified 06/30/2025  Duration 3:30  Isolated UF/Bypass No  BFR (mL) 450  DFR (mL) 700  Dialyzer Type OPTIFLUX 160NR  UF Order UF Range  UF Range (mL) 1000 - 2000  With BP Parameters Yes  Crit-Line used No  Heparin Initial Units Bolus No  Heparin IV Maintenance Bolus No  Heparin IV Infusion No  Potassium (mEq/L) 2  Calcium (mEq/L) 2.5  Bicarb (mg/dL) 40  Sodium (mEq/L) 137  Additional Orders to keep sbp greater than 90  Clinician Kristan Beck RN  Dialysis Access  Access Type Central Venous Catheter  Central Venous Catheter  Access Type Catheter - Tunneled  Access Location Chest Wall - R  1st Use Catheter Verified by Previous Use  Catheter Care Completed per Policy Yes  Dressing Dry and Intact on Arrival Yes  Dressing Changed No  Type of Dressing Film Biopatch/CHG  Last Date Changed 06/25/2025  If No select Reason Dressing Change Not Indicated per Policy  Type of HD Caps Curos  HD Caps Changed Yes  CVC Line Education Provided Yes - Dressing Change Frequency  Vitals  Pre-Treatment Vitals  Time Is BP being recorded? Pre BP Map BP Method Pulse RR Temp How was Weight Obtained? Pre Weight Previous Dry Weight Previous Post Weight Metric Target Fluid Removal (mL) Dialysate  Confirmed Clinician  06/30/2025 07:48 BP/Map 119/42 (68) Noninvasive 51 16 97.7 How Obtained: Reported Floor Weight 40.1   Kgs 2000 Yes Kristan Beck RN  Comments: pt is a & o x 3. hemodynamically stable.  Intra Procedure Vitals  Rec Type Time Is BP being recorded? BP Map Pulse RR BFR DFR AP  TMP UFR RMVD Bolus NSS Flush Chills Safety Check Crit-Line HCT Crit-Line BV% Clinician  E 06/30/2025 07:51 BP/Map 111/52 (72) 65 16 200 700 -50 20 30 570 0    Yes   Kristan Beck RN  Comments: tx initiated without difficulty. lines visible and secure. hemosafe applied. pt stable.  E 06/30/2025 08:00 BP/Map 126/57 (80) 59 16 450 700 -180 130 40 570     Yes   Kristan Beck RN  Comments: bfr increased to prescribed rate of 450. pt sleeping. even and nonlabored respirations. vitals stable.  E 06/30/2025 08:20 BP/Map               Yes   Kristan Beck RN  Comments: lines reversed due to multiple high arterial pressure alarms. bfr reduced to 400.  E 06/30/2025 08:30 BP/Map 117/55 (76) 69 16 400 700 -180 120 80 570 339    Yes   Kristan Beck RN  Comments: pt sleeping. even and nonlabored respirations. vital signs stable. no complications.  E 06/30/2025 09:00 BP/Map 147/73 (98) 71 18 450 700 -120 230 20 720 649    Yes   Kristan Beck RN  Comments: pt sleeping. even and nonlabored respirations. vital signs stable.  E 06/30/2025 09:30 BP/Map 105/48 (67) 67 18 300 700 -90 70 40 340 814    Yes   Kristan Beck RN  Comments: goal reduced to 1500 ml due to decrease in bp. patient about to receive pain medication.  E 06/30/2025 10:00 BP/Map 113/46 (68) 66 16 300 700 -90 70 50 340 967    Yes   Kristan Beck RN  Comments: pt awake and alert. Floor RN at bedside at this time to administer pain medications. vitals stable.  E 06/30/2025 10:30 BP/Map 135/58 (84) 68 18 300 700 -110 80 50 340 1129    Yes   Kristan Beck, DEMETRIUS  Comments: pt advised pain medication is working. vitals stable.  E 06/30/2025  11:00 BP/Map 137/52 (80) 77 18 300 700 -120 90 40 340 1287    Yes   Kristan Beck RN  Comments: pt sleeping. even and nonlabored respirations. vitals stable. access/lines visible and secure.  E 06/30/2025 11:30 BP/Map 155/58 (90) 81 18 300 700 -120 90 40 350 1444    Yes   Kristan Beck RN  Comments: pt sleeping. even and nonlabored respirations. vitals stable. hemosafe remains in place.  E 06/30/2025 11:39 BP/Map 156/58 (91) 81 16 200 700 -10 60 30 300 1500    Yes   Kristan Beck RN  Comments: tx complete. blood returning.  Post Treatment Vitals  Time Is BP being recorded? BP Map Pulse RR Temp How was Weight Obtained? Post Weight Metric BVP UF Goal Ordered NSS Given Intra-Procedure Total Machine UF Removed (mL) Crit-Line Ending Profile Crit-Line Refill Crit-Line Ending HCT Crit-Line Max BV% Clinician  06/30/2025 11:54 BP/Map 164/68 (100) 86 16 97.8 How Obtained: Reported Floor Weight 39.1 Kgs 64.7 1000 0 1500     Kristan Beck RN  Comments: blood returned.  Safety checks include: access uncovered and secured, Hemaclip secured for all central line access, machine checks performed, and alarm limits confirmed.  Labs  Hepatitis  HBsAG Lab Result HBsAG Lab Result HBsAG Draw Date Transient Antigenemia(MD Diagnosis Only) Anti-HBs Lab Result Anti-HBs Lab Value Anti-HBs Draw Date Documented By Documented Date Hepatitis Status Hepatitis Status  Negative  06/09/2025  Negative  04/30/2025 Kristan Beck 06/30/2025  Susceptible  Notes:   Pre-Treatment Hepatitis Precautions Copy of hepatitis results verified in hospital EMR Yes Signing  Patient tx with immune pts only Yes Hepatitis Information Entered By Kristan Beck RN Signed By Kristan Beck RN  Pre-Treatment Handoff  Staff Report Received Yes  Report Given by Primary Nurse Vilma Leon  Time 07:28  Patient Arrival  Patient ID Verified Date of Birth  Full Name  Patient Consent to treatment verified Yes  Blood Transfusion Consent Verified N/A  Treatment Comments  Treatment  Notes pt tolerated tx well without complications. 1 L removed. lumens flushed, hep locked and capped. pt stable.  Post-Treatment Handoff  Report Given to Primary Nurse Vilma Leon  Time Report Given 11:56  Report Given By Kristan Beck RN  Machine Validation  Time 07:45  Date 6/30/2025  Auto Alarm Test Passed Yes  Machine Serial # [id number]  Portable RO Yes  RO Serial# 14  Residual Bleach Negative N/A  Was a manufactured mix used? Yes  Machine Log Completed Yes  Total Chlorine (less than 0.1)? Yes  Total Chlorine Log Completed Yes  Bicarb BiBag  Bibag Size 900  Machine Temp 36.0  Machine Conductivity 13.7  Meter Type N/A  Meter Conductivity   Independent Conductivity 13.8  pH Status Pass Pass  pH   TCD Value 13.7  TCD Alarm with +/- 0.5 Yes  NVL enabled validated 100 asymmetric Yes  Safety check complete Kristan Beck RN  Second Verification Performed? No  Second Verification Performed By   Reason Not Verified No other staff members located at the hospital  Serum Lab Values  Time BUN Creatinine Na K (mEq/L) Cl CO2 Ca (mEq/L) Phos Mg (mg/dL) Alb (g/dL) Glucose Hgb Hct WBC Plt PT aPTT INR Other Clinician  06/30/2025 04:02 52.4 3.04 133 4.9 100 21 8.7 - - 2.8 99 8.5 26.6 12.42 135     Kristan Beck RN  Comments:   Facility Information Bed # station 4 Isolation Information  Facility Information Stat Treatment No Isolation Required? N  Admission Date 06/19/2025 Patient Type Chronic dialysis patient with diagnosis of ESRD Completed by  Ordering MD Dr. Saldana Patient Chronic Unit [facility] information Entered by Kristan Beck RN  Account/Finance Number [id number] Patient Home Unit [facility] Facility Information  Admission Status InPatient Code Status Full Code Notes pt is a & o x 3. hemodynamically stable.  Location Dialysis Suite Multi Diagnosis   Room # 361 Diagnosis Vascular occlusion   Start Treatment Time Out Confirmed by Kristan Beck/Vilma Leon Correct access site verified Yes  Treatment  Initiation Connections Secured Time Out Completed 07:45 Treatment Start Date 06/30/2025  Saline line double clamped Correct patient verified Yes Treatment Start Time 07:51  Hemaclip Applied Correct procedure verified Yes Patient/Family questions and concerns addressed Yes  Pre Focused Assessment Respiratory Efforts Unlabored GI / Bowels  Access Notes room air GI Soft  Signs and Symptoms of Infection? No Edema  Nontender  Pain Screening Location None  Bowel sounds present  Does the patient have pain? No Cardiac  Non distended  Notes pt received pain medication before coming to dialysis Heart Rhythm Regular   Respiratory Telemetry Yes  Voids  Lung Sounds Diminished Skin Completed by  Location Bilateral Skin Warm Pre Treatment Focused Assessment Completed By Kristan Beck RN  Bases  Dry Time 07:50  Position Bases LOC Signing  Anterior LOC Alert and Oriented x3 Signed By Kristan Beck RN  Education  Infection Prevention Focus or Topic Hemodialysis treatment review  Patient Education  Catheter Reduction Method Knowledge / Understanding Assessed Teach back  Patient Educated? Yes Method Knowledge / Understanding Assessed Teach back Patient Education Reinforced By  Focus or Topic Hemodialysis treatment review Family Education Provided? N/A Patient Education Reinforced by Kristan Beck RN  Access Maintenance Caregiver Education Provided? Yes   Post-Treatment HD machine external disinfection completed per policy Yes Completed by  Post Treatment Delay PRO external disinfection completed per policy Yes Post Treatment Form Completed By Kristan Beck RN  Delay N Post Treatment General Information   Machine Disinfection Requirement Notes pt tolerated tx well without complications. 1 L removed. lumens flushed, hep locked and capped. pt stable.   Post Focused Assessment Location Bilateral LOC Alert and Oriented x3  Changes from Pre Focused Assessment  Bases GI / Bowels  Changes from Pre Treatment Focused  Assessment? No Position Bases GI Soft  Access  Anterior  Bowel sounds present  Cath Packed with NS Respiratory Efforts Unlabored  Non distended  Access Port(ml) 1.6 Notes room air  Nontender  Return Port(ml) 1.6 Edema   Catheter clamped and capped Yes Location None  Voids  Access Flow Positional Cardiac Completed by  Dialyzer Clearance Streaked Heart Rhythm Regular Post Treatment Focused Assessment Completed by Kristan Beck RN  Pain Screening Telemetry Yes Date 06/30/2025  Does the patient have pain? No Skin Time 11:55  Notes pt received pain medication before coming to dialysis Skin Warm Signing  Respiratory  Dry Signed By Kristan Beck RN  Lung Sounds Diminished LOC

## 2025-06-30 NOTE — PLAN OF CARE
Goal Outcome Evaluation:  Plan of Care Reviewed With: patient        Progress: improving  Outcome Evaluation: PT tx completed. Pt just returned from dialysis. Reports increased pain, however, willing to try mobility. Requires min/mod x1 and increased time for supine to sit with use of slide pad. Once EOB demos a L lateral lean d/t pain in RLE pain. Completed LLE AROM while seated and performed x2 reps of reaching with RUE ouside of CHAZ. Pt requests to return to bed d/t pain level increasing. Required mod x1 to complete. Rolled L and R with min x1 for pad change. Will cont to follow.

## 2025-06-30 NOTE — PLAN OF CARE
Goal Outcome Evaluation:  Plan of Care Reviewed With: patient, family        Progress: improving     VSS. Dialysis today removed 1L - tolerated well. Stump dressing removed by Dr Zabala and stump sock to be placed later today. (Dressings remain CDI) Medicated for pain X3 this shift. Tolerating regular diet. RA. Possible discharge tomorrow to [facility] for rehab.

## 2025-06-30 NOTE — PLAN OF CARE
Goal Outcome Evaluation:              Outcome Evaluation: Pt outside of the room at his scheduled dialysis. Intake fluctuates but in the last 72 hr: 69%; PO fluids 480 ml/day. Per nursing notes, c/o pain. Had a critical low hemoglobin over the weekend; anemia is stable now. On room air. Last BM 6/25 - has BM regimen on med list. New labs: Na 133, BUN 52.4, Creat 3.04, Alb 2.8. Current diet order: regular, and receiving ONS for additional nutrition. Regular/house inpatient menu provides 109 g of protein and 2398 kcal if consumed 100%. See MAR for medications. Pt to continue receiving food preferences. Will monitor.

## 2025-07-01 LAB
ALBUMIN SERPL-MCNC: 2.8 G/DL (ref 3.5–5.2)
ALBUMIN/GLOB SERPL: 1 G/DL
ALP SERPL-CCNC: 229 U/L (ref 39–117)
ALT SERPL W P-5'-P-CCNC: 20 U/L (ref 1–41)
ANION GAP SERPL CALCULATED.3IONS-SCNC: 12 MMOL/L (ref 5–15)
AST SERPL-CCNC: 36 U/L (ref 1–40)
BASOPHILS # BLD AUTO: 0.07 10*3/MM3 (ref 0–0.2)
BASOPHILS NFR BLD AUTO: 0.5 % (ref 0–1.5)
BILIRUB SERPL-MCNC: 0.4 MG/DL (ref 0–1.2)
BUN SERPL-MCNC: 35.9 MG/DL (ref 8–23)
BUN/CREAT SERPL: 15 (ref 7–25)
CALCIUM SPEC-SCNC: 8.6 MG/DL (ref 8.6–10.5)
CHLORIDE SERPL-SCNC: 101 MMOL/L (ref 98–107)
CO2 SERPL-SCNC: 23 MMOL/L (ref 22–29)
CREAT SERPL-MCNC: 2.4 MG/DL (ref 0.76–1.27)
DEPRECATED RDW RBC AUTO: 58.6 FL (ref 37–54)
EGFRCR SERPLBLD CKD-EPI 2021: 27.1 ML/MIN/1.73
EOSINOPHIL # BLD AUTO: 0.26 10*3/MM3 (ref 0–0.4)
EOSINOPHIL NFR BLD AUTO: 1.7 % (ref 0.3–6.2)
ERYTHROCYTE [DISTWIDTH] IN BLOOD BY AUTOMATED COUNT: 17.2 % (ref 12.3–15.4)
GLOBULIN UR ELPH-MCNC: 2.7 GM/DL
GLUCOSE SERPL-MCNC: 100 MG/DL (ref 65–99)
HCT VFR BLD AUTO: 27 % (ref 37.5–51)
HGB BLD-MCNC: 8.7 G/DL (ref 13–17.7)
IMM GRANULOCYTES # BLD AUTO: 0.15 10*3/MM3 (ref 0–0.05)
IMM GRANULOCYTES NFR BLD AUTO: 1 % (ref 0–0.5)
LYMPHOCYTES # BLD AUTO: 1.12 10*3/MM3 (ref 0.7–3.1)
LYMPHOCYTES NFR BLD AUTO: 7.2 % (ref 19.6–45.3)
MCH RBC QN AUTO: 31 PG (ref 26.6–33)
MCHC RBC AUTO-ENTMCNC: 32.2 G/DL (ref 31.5–35.7)
MCV RBC AUTO: 96.1 FL (ref 79–97)
MONOCYTES # BLD AUTO: 1.18 10*3/MM3 (ref 0.1–0.9)
MONOCYTES NFR BLD AUTO: 7.6 % (ref 5–12)
NEUTROPHILS NFR BLD AUTO: 12.74 10*3/MM3 (ref 1.7–7)
NEUTROPHILS NFR BLD AUTO: 82 % (ref 42.7–76)
NRBC BLD AUTO-RTO: 0 /100 WBC (ref 0–0.2)
PLATELET # BLD AUTO: 148 10*3/MM3 (ref 140–450)
PMV BLD AUTO: 9.7 FL (ref 6–12)
POTASSIUM SERPL-SCNC: 4.1 MMOL/L (ref 3.5–5.2)
PROT SERPL-MCNC: 5.5 G/DL (ref 6–8.5)
RBC # BLD AUTO: 2.81 10*6/MM3 (ref 4.14–5.8)
SODIUM SERPL-SCNC: 136 MMOL/L (ref 136–145)
WBC NRBC COR # BLD AUTO: 15.52 10*3/MM3 (ref 3.4–10.8)

## 2025-07-01 PROCEDURE — 85025 COMPLETE CBC W/AUTO DIFF WBC: CPT | Performed by: FAMILY MEDICINE

## 2025-07-01 PROCEDURE — 97530 THERAPEUTIC ACTIVITIES: CPT

## 2025-07-01 PROCEDURE — 80053 COMPREHEN METABOLIC PANEL: CPT | Performed by: INTERNAL MEDICINE

## 2025-07-01 PROCEDURE — 97110 THERAPEUTIC EXERCISES: CPT

## 2025-07-01 RX ORDER — VALSARTAN 80 MG/1
80 TABLET ORAL DAILY
Status: DISCONTINUED | OUTPATIENT
Start: 2025-07-02 | End: 2025-07-03 | Stop reason: HOSPADM

## 2025-07-01 RX ORDER — POLYETHYLENE GLYCOL 3350 17 G/17G
17 POWDER, FOR SOLUTION ORAL DAILY
Status: DISCONTINUED | OUTPATIENT
Start: 2025-07-01 | End: 2025-07-03 | Stop reason: HOSPADM

## 2025-07-01 RX ADMIN — TAMSULOSIN HYDROCHLORIDE 0.4 MG: 0.4 CAPSULE ORAL at 20:22

## 2025-07-01 RX ADMIN — MONTELUKAST SODIUM 10 MG: 10 TABLET, COATED ORAL at 20:22

## 2025-07-01 RX ADMIN — ISOSORBIDE DINITRATE 10 MG: 10 TABLET ORAL at 09:28

## 2025-07-01 RX ADMIN — Medication 10 ML: at 20:22

## 2025-07-01 RX ADMIN — CARVEDILOL 12.5 MG: 6.25 TABLET, FILM COATED ORAL at 20:23

## 2025-07-01 RX ADMIN — GABAPENTIN 300 MG: 300 CAPSULE ORAL at 20:22

## 2025-07-01 RX ADMIN — DOCUSATE SODIUM 50 MG AND SENNOSIDES 8.6 MG 2 TABLET: 8.6; 5 TABLET, FILM COATED ORAL at 20:21

## 2025-07-01 RX ADMIN — ASPIRIN 81 MG: 81 TABLET, COATED ORAL at 09:28

## 2025-07-01 RX ADMIN — ISOSORBIDE DINITRATE 10 MG: 10 TABLET ORAL at 13:57

## 2025-07-01 RX ADMIN — ATORVASTATIN CALCIUM 10 MG: 10 TABLET, FILM COATED ORAL at 20:22

## 2025-07-01 RX ADMIN — CARVEDILOL 12.5 MG: 6.25 TABLET, FILM COATED ORAL at 09:27

## 2025-07-01 RX ADMIN — POLYETHYLENE GLYCOL 3350 17 G: 17 POWDER, FOR SOLUTION ORAL at 17:08

## 2025-07-01 RX ADMIN — ISOSORBIDE DINITRATE 10 MG: 10 TABLET ORAL at 17:09

## 2025-07-01 RX ADMIN — Medication 150 MG: at 09:28

## 2025-07-01 RX ADMIN — OXYCODONE AND ACETAMINOPHEN 1 TABLET: 325; 10 TABLET ORAL at 20:22

## 2025-07-01 RX ADMIN — LEVOTHYROXINE SODIUM 100 MCG: 0.1 TABLET ORAL at 05:26

## 2025-07-01 RX ADMIN — Medication 10 ML: at 09:28

## 2025-07-01 RX ADMIN — OXYCODONE AND ACETAMINOPHEN 1 TABLET: 325; 10 TABLET ORAL at 09:28

## 2025-07-01 RX ADMIN — DOCUSATE SODIUM 50 MG AND SENNOSIDES 8.6 MG 2 TABLET: 8.6; 5 TABLET, FILM COATED ORAL at 09:28

## 2025-07-01 RX ADMIN — CLOPIDOGREL BISULFATE 75 MG: 75 TABLET, FILM COATED ORAL at 10:36

## 2025-07-01 RX ADMIN — NIFEDIPINE 120 MG: 60 TABLET, FILM COATED, EXTENDED RELEASE ORAL at 17:09

## 2025-07-01 NOTE — PLAN OF CARE
Goal Outcome Evaluation:  Plan of Care Reviewed With: patient        Progress: improving     VSS. Patient very sleepy today. Plans for dialysis tomorrow and likely discharge to [facility] after dialysis pending labs. Medicated for pain X1. RA. Safety maintained. Right stump sock CDI.

## 2025-07-01 NOTE — THERAPY TREATMENT NOTE
Acute Care - Physical Therapy Treatment Note  [facility]     Patient Name: Ricardo Hugo  : 1950  MRN: [id number]  Today's Date: 2025      Visit Dx:     ICD-10-CM ICD-9-CM   1. Vascular occlusion  I99.8 459.9   2. Arterial occlusion  I70.90 444.9   3. Dialysis patient  Z99.2 V45.11   4. Impaired mobility [Z74.09]  Z74.09 799.89   5. Severe protein-calorie malnutrition  E43 262   6. Thrombocytopenia  D69.6 287.5   7. Hyperkalemia  E87.5 276.7   8. Acute pain of left lower extremity  M79.605 729.5   9. Anemia, unspecified type  D64.9 285.9   10. Preop testing  Z01.818 V72.84   11. ESRD (end stage renal disease) on dialysis  N18.6 585.6    Z99.2 V45.11   12. Abnormal TSH  R79.89 790.6   13. History of pneumonia, current treatment with cefdinir  Z87.01 V12.61   14. Bradycardia  R00.1 427.89   15. Chronic diastolic (congestive) heart failure  I50.32 428.32     428.0   16. Persistent proteinuria  R80.1 791.0   17. Dermatitis associated with moisture  L30.8 692.89   18. Sleep difficulties  G47.9 780.50   19. Bilateral inguinal hernia without obstruction or gangrene, recurrence not specified  K40.20 550.92   20. Unilateral recurrent inguinal hernia without obstruction or gangrene  K40.91 550.91   21. Hypertrophy of inferior nasal turbinate  J34.3 478.0   22. Nasal septal deviation  J34.2 470   23. Anticoagulated  Z79.01 V58.61   24. Sensorineural hearing loss (SNHL), bilateral  H90.3 389.18   25. Eustachian tube dysfunction, bilateral  H69.93 381.81   26. Systolic murmur  R01.1 785.2   27. Mixed hyperlipidemia  E78.2 272.2   28. Perforation of left tympanic membrane  H72.92 384.20   29. CLL (chronic lymphocytic leukemia)  C91.10 204.10   30. Low iron   E61.1 280.9   31. Copper deficiency  E61.0 275.1   32. Anemia due to chronic kidney disease, on chronic dialysis  N18.6 585.6    D63.1 285.21    Z99.2 V45.11   33. CKD (chronic kidney disease) stage 5  N18.4 585.4   34. Diastolic dysfunction  I51.89 429.9   35.  Carotid stenosis, right  I65.21 433.10   36. Stenosis of right carotid artery  I65.21 433.10   37. Bilateral carotid artery stenosis  I65.23 433.10     433.30   38. IHD (ischemic heart disease)  I25.9 414.9   39. Peripheral arterial disease  I73.9 443.9   40. Wellness examination  Z00.00 V70.0   41. Symptomatic anemia  D64.9 285.9   42. Chronic diarrhea  K52.9 787.91   43. Resistant hypertension  I1A.0 401.9   44. Essential hypertension  I10 401.9   45. Chronic rhinitis  J31.0 472.0     Patient Active Problem List   Diagnosis    Chronic rhinitis    Essential hypertension    Resistant hypertension    Chronic diarrhea    Symptomatic anemia    Wellness examination    Peripheral arterial disease    IHD (ischemic heart disease)    Carotid stenosis    Stenosis of right carotid artery    Carotid stenosis, right    Diastolic dysfunction    CKD (chronic kidney disease) stage 5    Anemia due to chronic kidney disease    Copper deficiency    Low iron     CLL (chronic lymphocytic leukemia)    Perforation of left tympanic membrane    Mixed hyperlipidemia    Systolic murmur    Eustachian tube dysfunction, bilateral    Sensorineural hearing loss (SNHL), bilateral    Anticoagulated    Nasal septal deviation    Hypertrophy of inferior nasal turbinate    Unilateral recurrent inguinal hernia without obstruction or gangrene    Bilateral inguinal hernia without obstruction or gangrene    Sleep difficulties    Dermatitis associated with moisture    Proteinuria    Chronic diastolic (congestive) heart failure    Bradycardia    History of pneumonia, current treatment with cefdinir    Abnormal TSH    ESRD (end stage renal disease) on dialysis    Preop testing    Anemia, multifactorial to include chronic kidney disease, iron deficiency and possible bleed    Acute pain of left lower extremity    Hyperkalemia    Arterial occlusion    Thrombocytopenia    Severe protein-calorie malnutrition    Chronic heart failure with preserved ejection  fraction (HFpEF)    Vascular occlusion    COPD (chronic obstructive pulmonary disease)     Past Medical History:   Diagnosis Date    3-vessel CAD 08/11/2020    Allergic rhinitis     Anemia     Anxiety disorder 04/27/2020    Arthritis     Asymmetrical sensorineural hearing loss 06/28/2017    Atherosclerosis of native artery of both lower extremities with intermittent claudication 07/18/2019    Avascular necrosis of femoral head, left 07/11/2020    right hip after surgery    Carotid stenosis     Chronic mucoid otitis media     Chronic rhinitis     COPD (chronic obstructive pulmonary disease)     Coronary artery disease     HEART BYPASS 2004    Crohn's disease of large intestine with other complication 07/30/2020    Chronic diarrhea Colonoscopy July 2020 revealed mild patchy scattered hemosiderin staining with inflammation more so in rectosigmoid area.  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    Iron deficiency anemia 07/14/2020    Mixed hearing loss of left ear     PAD (peripheral artery disease) 08/11/2020    Perianal abscess     Pernicious anemia 08/17/2020    took shots but never diagnosed with b12 deficiency    Personal history of alcoholism 08/11/2020    quit drinking in 2013    Prostatic hypertrophy 08/11/2020    Sensorineural hearing loss     Sepsis with acute renal failure 09/15/2020    Shortness of breath 05/27/2021    Tinnitus     Ventricular tachycardia, nonsustained 07/14/2020    Weight loss 07/11/2020     Past Surgical History:   Procedure Laterality Date    ABOVE KNEE AMPUTATION Right 6/24/2025    Procedure: right above knee amputation;  Surgeon:  Blayne Zabala MD;  Location: [facility] OR;  Service: Vascular;  Laterality: Right;    AORTOGRAM Right 4/25/2025    Procedure: RIGHT LOWER EXTREMITY ANGIOGRAM, SHOCKWAVE LITHOTRIPSY, BALLOON ANGIOPLASTY, MYNX CLOSURE;  Surgeon: Gil Pineda DO;  Location: [facility] HYBRID OR;  Service: Vascular;  Laterality: Right;    AORTOGRAM Left 5/22/2025    Procedure: LEFT LOWER EXTREMITY ANGIOGRAM, SHOCKWAVE LITHOTRIPSY, BALLOON ANGIOPLASTY, STENT PLACEMENT, MYNX CLOSURE;  Surgeon: Blayne Zabala MD;  Location: [facility] HYBRID OR;  Service: Vascular;  Laterality: Left;    AORTOGRAM Right 5/30/2025    Procedure: AORTOILIAC ANGIOGRAM WITH LEFT LOWER EXTREMITY ANGIOGRAM;  Surgeon: Gil Pineda DO;  Location: [facility] HYBRID OR;  Service: Vascular;  Laterality: Right;    AORTOGRAM Right 6/20/2025    Procedure: right lower extremity arteriogram, shockwave lithotripsy, balloon angioplasty, mynx closue;  Surgeon: Blayne Zabala MD;  Location: [facility] HYBRID OR;  Service: Vascular;  Laterality: Right;    ARTERY SURGERY  2021    right side on neck    CAROTID ENDARTERECTOMY Right 05/10/2021    Procedure: RIGHT CAROTID ENDARTERECTOMY WITH EEG;  Surgeon: Gil Pineda DO;  Location: [facility] HYBRID OR 12;  Service: Vascular;  Laterality: Right;    COLONOSCOPY N/A 07/02/2020    Procedure: COLONOSCOPY WITH ANESTHESIA;  Surgeon: Adrien Brewster MD;  Location: [facility] ENDOSCOPY;  Service: Gastroenterology;  Laterality: N/A;  pre op: diarrhea  post op: polyps  PCP: Joe Velasco MD    COLONOSCOPY N/A 10/13/2020    Procedure: COLONOSCOPY WITH ANESTHESIA;  Surgeon: Adrien Brewster MD;  Location: [facility] ENDOSCOPY;  Service: Gastroenterology;  Laterality: N/A;  Pre: Chronic Diarrhea, Crohn's  Post: AVM  Dr. Neftali Velasco  CO2 Inflation Used    COLONOSCOPY N/A 12/08/2023    Procedure: COLONOSCOPY WITH ANESTHESIA;  Surgeon: Adrien Brewster MD;  Location: [facility] ENDOSCOPY;  Service: Gastroenterology;  Laterality: N/A;  pre  chrone's disease  post sub optimal prep, polyp, chrone's      CORONARY ARTERY BYPASS GRAFT  2003    x3    ENDOSCOPY N/A 11/02/2021    Procedure: ESOPHAGOGASTRODUODENOSCOPY WITH ANESTHESIA;  Surgeon: Bridger Bell MD;  Location: [facility] ENDOSCOPY;  Service: Gastroenterology;  Laterality: N/A;  pre anemia;gi bleed  post  gi bleed;schatski ring  Dr. ERIC Velasco    ENDOSCOPY N/A 10/10/2023    Procedure: ESOPHAGOGASTRODUODENOSCOPY WITH ANESTHESIA;  Surgeon: Adrien Brewster MD;  Location: [facility] ENDOSCOPY;  Service: Gastroenterology;  Laterality: N/A;  preop; anemia  postop esophagitis ; R/O barretts   PCP Randall Beata    EYE SURGERY Bilateral     catorac    INCISION AND DRAINAGE PERIRECTAL ABSCESS N/A 03/03/2017    Procedure: INCISION AND DRAINAGE OF JEET ANAL ABSCESS;  Surgeon: Lynette Smith MD;  Location: [facility] OR;  Service:     INGUINAL HERNIA REPAIR Bilateral 06/27/2023    Procedure: INGUINAL HERNIA BILATERAL REPAIR LAPAROSCOPIC WITH DAVINCI ROBOT WITH MESH;  Surgeon: Tahira Rivera MD;  Location: [facility] OR;  Service: Robotics - DaVinci;  Laterality: Bilateral;    INSERTION HEMODIALYSIS CATHETER N/A 4/16/2025    Procedure: HEMODIALYSIS CATHETER PLACEMENT;  Surgeon: Gil Pineda DO;  Location: [facility] HYBRID OR;  Service: Vascular;  Laterality: N/A;    MYRINGOTOMY W/ TUBES Left 04/17/2017    06/10/2016    TONSILLECTOMY      TOTAL HIP ARTHROPLASTY Right 2006     PT Assessment (Last 12 Hours)       PT Evaluation and Treatment       Row Name 07/01/25 1441          Physical Therapy Time and Intention    Subjective Information complains of;fatigue;weakness  -LY     Document Type therapy note (daily note)  -LY     Mode of Treatment physical therapy  -LY       Row Name 07/01/25 1441          General Information    Existing Precautions/Restrictions fall  R AKA, stump protector  -LY       Row Name 07/01/25 1441          Pain    Pretreatment Pain Rating 6/10  -LY     Posttreatment Pain Rating  6/10  -LY     Pain Location foot  -LY     Pain Side/Orientation left  -LY     Pain Management Interventions exercise or physical activity utilized  -LY     Response to Pain Interventions activity and movement patterns unchanged  -LY       Row Name 07/01/25 1441          Bed Mobility    Rolling Left [place] (Bed Mobility) verbal cues;minimum assist (75% patient effort)  -LY     Rolling Right [place] (Bed Mobility) verbal cues;minimum assist (75% patient effort)  -LY     Scooting/Bridging [place] (Bed Mobility) dependent (less than 25% patient effort)  -LY     Supine-Sit [place] (Bed Mobility) minimum assist (75% patient effort);verbal cues  -LY     Sit-Supine [place] (Bed Mobility) verbal cues;minimum assist (75% patient effort)  -LY     Assistive Device (Bed Mobility) repositioning sheet;head of bed elevated;bed rails  -LY       Row Name 07/01/25 1441          Transfers    Transfers sit-stand transfer;stand-sit transfer  -LY       Row Name 07/01/25 1441          Sit-Stand Transfer    Sit-Stand [place] (Transfers) verbal cues;minimum assist (75% patient effort)  -LY     Comment, (Sit-Stand Transfer) x3 reps, able to stand for up to 15 seconds at a time  -LY       Row Name 07/01/25 1441          Stand-Sit Transfer    Stand-Sit [place] (Transfers) verbal cues;minimum assist (75% patient effort)  -LY       Row Name 07/01/25 1441          Balance    Comment, Balance reaching outside of midline with BUEs, frequent rest breaks  -LY       Row Name 07/01/25 1441          Motor Skills    Comments, Therapeutic Exercise LLE AROM seated x15 reps  -LY       Row Name             Residual Limb Assessment 06/24/25 1900 transfemoral (above knee), right    Residual Limb Assessment - Properties Group Placement Date: 06/24/25  -JR Placement Time: 1900  -JR Amputation Date: 06/24/25  -JR Location: transfemoral (above knee), right  -JR    Retired Residual Limb Assessment - Properties Group Placement  Date: 06/24/25  -JR Placement Time: 1900 -JR Amputation Date: 06/24/25  -JR Location: transfemoral (above knee), right  -JR    Retired Residual Limb Assessment - Properties Group Placement Date: 06/24/25  -JR Placement Time: 1900  -JR Amputation Date: 06/24/25  -JR Location: transfemoral (above knee), right  -JR    Retired Residual Limb Assessment - Properties Group Date first assessed: 06/24/25  -JR Time first assessed: 1900 -JR Amputation Date: 06/24/25  -JR Location: transfemoral (above knee), right  -JR      Row Name             Wound 05/21/25 2311 Bilateral sacral spine     Wound - Properties Group Placement Date: 05/21/25  -SB Placement Time: 2311  -SB Present on Original Admission: Y  -SB Side: Bilateral  -SB Location: sacral spine  -SB Primary Wound Type: --  -SB, nonblanchable discoloration     Retired Wound - Properties Group Placement Date: 05/21/25  -SB Placement Time: 2311  -SB Present on Original Admission: Y  -SB Side: Bilateral  -SB Location: sacral spine  -SB    Retired Wound - Properties Group Placement Date: 05/21/25  -SB Placement Time: 2311  -SB Present on Original Admission: Y  -SB Side: Bilateral  -SB Location: sacral spine  -SB    Retired Wound - Properties Group Date first assessed: 05/21/25  -SB Time first assessed: 2311  -SB Present on Original Admission: Y  -SB Side: Bilateral  -SB Location: sacral spine  -SB      Row Name             Wound 06/19/25 2024 Left anterior plantar    Wound - Properties Group Placement Date: 06/19/25  -BR Placement Time: 2024  -BR Side: Left  -BR Orientation: anterior  -BR Location: plantar  -BR    Retired Wound - Properties Group Placement Date: 06/19/25  -BR Placement Time: 2024 -BR Side: Left  -BR Orientation: anterior  -BR Location: plantar  -BR    Retired Wound - Properties Group Placement Date: 06/19/25  -BR Placement Time: 2024 -BR Side: Left  -BR Orientation: anterior  -BR Location: plantar  -BR    Retired Wound - Properties Group Date first  assessed: 06/19/25  -BR Time first assessed: 2024 -BR Side: Left  -BR Location: plantar  -BR      Row Name             Wound 06/20/25 1656 Left anterior groin Surgical Closed Surgical Incision    Wound - Properties Group Placement Date: 06/20/25  -AC Placement Time: 1656 -AC Present on Original Admission: N  -AC Side: Left  -AC Orientation: anterior  -AC Location: groin  -AC Primary Wound Type: Surgical  -AC Secondary Wound Type - Surgical: Closed Surgi  -AC Additional Comments: mynx 2x2 tegaderm  -AC    Retired Wound - Properties Group Placement Date: 06/20/25  -AC Placement Time: 1656  -AC Present on Original Admission: N  -AC Side: Left  -AC Orientation: anterior  -AC Location: groin  -AC Additional Comments: mynx 2x2 tegaderm  -AC    Retired Wound - Properties Group Placement Date: 06/20/25  -AC Placement Time: 1656 -AC Present on Original Admission: N  -AC Side: Left  -AC Orientation: anterior  -AC Location: groin  -AC Additional Comments: mynx 2x2 tegaderm  -AC    Retired Wound - Properties Group Date first assessed: 06/20/25  -AC Time first assessed: 1656 -AC Present on Original Admission: N  -AC Side: Left  -AC Location: groin  -AC Additional Comments: mynx 2x2 tegaderm  -AC      Row Name             Wound 06/24/25 1258 Right distal thigh Surgical Closed Surgical Incision    Wound - Properties Group Placement Date: 06/24/25  -CB Placement Time: 1258  -CB Present on Original Admission: N  -CB Side: Right  -CB Orientation: distal  -CB Location: thigh  -CB Primary Wound Type: Surgical  -CB Secondary Wound Type - Surgical: Closed Surgi  -CB    Retired Wound - Properties Group Placement Date: 06/24/25  -CB Placement Time: 1258  -CB Present on Original Admission: N  -CB Side: Right  -CB Orientation: distal  -CB Location: thigh  -CB    Retired Wound - Properties Group Placement Date: 06/24/25  -CB Placement Time: 1258  -CB Present on Original Admission: N  -CB Side: Right  -CB Orientation: distal  -CB Location:  thigh  -CB    Retired Wound - Properties Group Date first assessed: 06/24/25  -CB Time first assessed: 1258  -CB Present on Original Admission: N  -CB Side: Right  -CB Location: thigh  -CB      Row Name 07/01/25 1441          Plan of Care Review    Plan of Care Reviewed With patient  -LY     Progress improving  -LY       Row Name 07/01/25 1441          Positioning and Restraints    Pre-Treatment Position in bed  -LY     Post Treatment Position bed  -LY     In Bed fowlers;call light within reach;encouraged to call for assist;with family/caregiver  -LY               User Key  (r) = Recorded By, (t) = Taken By, (c) = Cosigned By      Initials Name Provider Type    AC Emilia Kline RN Registered Nurse    Jocelynn Londono, PTA Physical Therapist Assistant    eJni Nathan, RN Registered Nurse    Francisco Lieberman, RN Registered Nurse    Dorian Cote, RN Registered Nurse    Doug Brito RN Registered Nurse                    Physical Therapy Education       Title: PT OT SLP Therapies (In Progress)       Topic: Physical Therapy (In Progress)       Point: Mobility training (In Progress)       Learning Progress Summary            Patient Acceptance, E, NR by EM at 6/29/2025 1532    Acceptance, E, NR by EM at 6/28/2025 1726    Acceptance, E, NR,VU by AD at 6/26/2025 1436    Comment: POC, d/c plans, body mechanics, positioning techniques   Family Acceptance, E, NR by EM at 6/29/2025 1532    Acceptance, E, NR,VU by AD at 6/26/2025 1436    Comment: POC, d/c plans, body mechanics, positioning techniques                      Point: Home exercise program (In Progress)       Learning Progress Summary            Patient Acceptance, E, NR by EM at 6/29/2025 1532    Acceptance, E, NR by EM at 6/28/2025 1726    Acceptance, E, NR,VU by AD at 6/26/2025 1436    Comment: POC, d/c plans, body mechanics, positioning techniques   Family Acceptance, E, NR by EM at 6/29/2025 1532    Acceptance, E, NR,VU by AD at 6/26/2025  1436    Comment: POC, d/c plans, body mechanics, positioning techniques                      Point: Body mechanics (In Progress)       Learning Progress Summary            Patient Acceptance, E, NR by EM at 6/29/2025 1532    Acceptance, E, NR by EM at 6/28/2025 1726    Acceptance, E, NR,VU by AD at 6/26/2025 1436    Comment: POC, d/c plans, body mechanics, positioning techniques   Family Acceptance, E, NR by EM at 6/29/2025 1532    Acceptance, E, NR,VU by AD at 6/26/2025 1436    Comment: POC, d/c plans, body mechanics, positioning techniques                      Point: Precautions (In Progress)       Learning Progress Summary            Patient Acceptance, E, NR by EM at 6/29/2025 1532    Acceptance, E, NR by EM at 6/28/2025 1726    Acceptance, E, NR,VU by AD at 6/26/2025 1436    Comment: POC, d/c plans, body mechanics, positioning techniques   Family Acceptance, E, NR by EM at 6/29/2025 1532    Acceptance, E, NR,VU by AD at 6/26/2025 1436    Comment: POC, d/c plans, body mechanics, positioning techniques                                      User Key       Initials Effective Dates Name Provider Type Discipline    AD 04/21/25 -  Yon Magaña, PT Student PT Student PT    EM 06/06/25 -  Felipa Mckenzie, RN Registered Nurse Nurse                  PT Recommendation and Plan  Anticipated Discharge Disposition (PT): skilled nursing facility  Plan of Care Reviewed With: patient  Progress: improving  Outcome Evaluation: PT tx completed. Pt just returned from dialysis. Reports increased pain, however, willing to try mobility. Requires min/mod x1 and increased time for supine to sit with use of slide pad. Once EOB demos a L lateral lean d/t pain in RLE pain. Completed LLE AROM while seated and performed x2 reps of reaching with RUE ouside of CHAZ. Pt requests to return to bed d/t pain level increasing. Required mod x1 to complete. Rolled L and R with min x1 for pad change. Will cont to follow.       Time Calculation:    PT  Charges       Row Name 07/01/25 1533             Time Calculation    Start Time 1441  -LY      Stop Time 1519  -LY      Time Calculation (min) 38 min  -LY      PT Received On 07/01/25  -LY         Time Calculation- PT    Total Timed Code Minutes- PT 38 minute(s)  -LY         Timed Charges    53553 - PT Therapeutic Exercise Minutes 15  -LY      03582 - PT Therapeutic Activity Minutes 23  -LY         Total Minutes    Timed Charges Total Minutes 38  -LY       Total Minutes 38  -LY                User Key  (r) = Recorded By, (t) = Taken By, (c) = Cosigned By      Initials Name Provider Type    Jocelynn Londono PTA Physical Therapist Assistant                  Therapy Charges for Today       Code Description Service Date Service Provider Modifiers Qty    74954837955 HC PT THER PROC EA 15 MIN 6/30/2025 Jocelynn Fabian, PTA GP 1    25598177367 HC PT THERAPEUTIC ACT EA 15 MIN 6/30/2025 Jocelynn Fabian, JULIO GP 1    21555965750 HC PT THER PROC EA 15 MIN 7/1/2025 Jocelynn Fabian, JULIO GP 1    31852657223 HC PT THERAPEUTIC ACT EA 15 MIN 7/1/2025 Jocelynn Fabian, JULIO GP 2            PT G-Codes  Outcome Measure Options: AM-PAC 6 Clicks Basic Mobility (PT)  AM-PAC 6 Clicks Score (PT): 12    Jocelynn Fabian PTA  7/1/2025

## 2025-07-01 NOTE — PROGRESS NOTES
[facility] Medicine Services  INPATIENT PROGRESS NOTE    Patient Name: Ricardo Hugo  Date of Admission: 6/19/2025  Today's Date: 07/01/25  Length of Stay: 12  Primary Care Physician: Nathan Zimmer MD    Subjective   Chief Complaint: Peripheral vascular disease.   HPI   Blood pressure slightly high but stable, afebrile.  Restart Diovan.  Hyponatremia resolved.  Metabolic acidosis resolved.  Creatinine is improved.  Leukocytosis increased.  Hemoglobin stable.  Platelet count is normalized.  Patient is on room air.    Review of Systems   Constitutional:  Positive for activity change, appetite change and fatigue. Negative for chills and fever.   HENT:  Negative for hearing loss, nosebleeds, tinnitus and trouble swallowing.    Eyes:  Negative for visual disturbance.   Respiratory:  Negative for cough, chest tightness, shortness of breath and wheezing.    Cardiovascular:  Negative for chest pain, palpitations and leg swelling.   Gastrointestinal:  Negative for abdominal distention, abdominal pain, blood in stool, constipation, diarrhea, nausea and vomiting.   Endocrine: Negative for cold intolerance, heat intolerance, polydipsia, polyphagia and polyuria.   Genitourinary:  Negative for decreased urine volume, difficulty urinating, dysuria, flank pain, frequency and hematuria.   Musculoskeletal:  Positive for arthralgias, gait problem and myalgias. Negative for joint swelling.   Skin:  Negative for rash.   Allergic/Immunologic: Negative for immunocompromised state.   Neurological:  Positive for weakness. Negative for dizziness, syncope, light-headedness and headaches.   Hematological:  Negative for adenopathy. Does not bruise/bleed easily.   Psychiatric/Behavioral:  Negative for confusion and sleep disturbance. The patient is not nervous/anxious.    All pertinent negatives and positives are as above. All other systems have been reviewed and are negative unless otherwise stated.      Objective    Temp:  [97.2 °F (36.2 °C)-98.7 °F (37.1 °C)] 97.6 °F (36.4 °C)  Heart Rate:  [65-81] 71  Resp:  [16-18] 18  BP: (123-154)/(42-52) 142/52  Physical Exam  Vitals and nursing note reviewed.   Constitutional:       Comments: Advanced age.  Chronically ill.  Cachectic.   HENT:      Head: Normocephalic.   Eyes:      Conjunctiva/sclera: Conjunctivae normal.      Pupils: Pupils are equal, round, and reactive to light.   Cardiovascular:      Rate and Rhythm: Normal rate and regular rhythm.      Heart sounds: Normal heart sounds.   Pulmonary:      Effort: Pulmonary effort is normal. No respiratory distress.      Breath sounds: Normal breath sounds.      Comments: On room air.  Abdominal:      General: Bowel sounds are normal. There is no distension.      Palpations: Abdomen is soft.      Tenderness: There is no abdominal tenderness.   Musculoskeletal:         General: No swelling.      Cervical back: Neck supple.      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   Skin:     General: Skin is warm and dry.      Findings: No rash.   Neurological:      Mental Status: He is alert and oriented to person, place, and time.      Motor: Weakness present.      Coordination: Coordination abnormal.      Gait: Gait abnormal.   Psychiatric:         Mood and Affect: Mood normal.         Behavior: Behavior normal.         Thought Content: Thought content normal.          Results Review:  I have reviewed the labs, radiology results, and diagnostic studies.    Laboratory Data:   Results from last 7 days   Lab Units 07/01/25  0451 06/30/25  0402 06/29/25  0310   WBC 10*3/mm3 15.52* 12.42* 8.10   HEMOGLOBIN g/dL 8.7* 8.5* 8.8*   HEMATOCRIT % 27.0* 26.6* 27.0*   PLATELETS 10*3/mm3 148 135* 127*        Results from last 7 days   Lab Units 07/01/25  0451 06/30/25  0402 06/29/25  0310 06/28/25  0405 06/26/25  0531   SODIUM mmol/L 136 133* 135*   < > 132*   POTASSIUM mmol/L 4.1 4.9 4.4   < > 4.1  "  CHLORIDE mmol/L 101 100 98   < > 99   CO2 mmol/L 23.0 21.0* 26.0   < > 22.0   BUN mg/dL 35.9* 52.4* 39.3*   < > 18.7   CREATININE mg/dL 2.40* 3.04* 2.77*   < > 1.98*   CALCIUM mg/dL 8.6 8.7 8.7   < > 8.6   BILIRUBIN mg/dL 0.4 0.4  --   --  0.6   ALK PHOS U/L 229* 206*  --   --  149*   ALT (SGPT) U/L 20 19  --   --  <5   AST (SGOT) U/L 36 38  --   --  34   GLUCOSE mg/dL 100* 99 97   < > 113*    < > = values in this interval not displayed.       Culture Data:   No results found for: \"BLOODCX\", \"URINECX\", \"WOUNDCX\", \"MRSACX\", \"RESPCX\", \"STOOLCX\"    Radiology Data:   Imaging Results (Last 24 Hours)       ** No results found for the last 24 hours. **            I have reviewed the patient's current medications.     Assessment/Plan   Assessment  Active Hospital Problems    Diagnosis     **Vascular occlusion     COPD (chronic obstructive pulmonary disease)     Severe protein-calorie malnutrition     Chronic heart failure with preserved ejection fraction (HFpEF)     Thrombocytopenia     ESRD (end stage renal disease) on dialysis     Chronic diastolic (congestive) heart failure     Sensorineural hearing loss (SNHL), bilateral     Eustachian tube dysfunction, bilateral     Mixed hyperlipidemia     CLL (chronic lymphocytic leukemia)     CKD (chronic kidney disease) stage 5     Diastolic dysfunction     Resistant hypertension     Peripheral arterial disease     Essential hypertension        Treatment Plan  Vascular occlusion - With intervention on 6/22 right popliteal artery and peroneal artery.  Also had PTA to the right posterior tibial artery.  Was on heparin drip preprocedure. Post procedure on aspirin, Plavix, Lipitor.  Unfortunately despite attempts at revascularization patient continued to have PAD with poor blood flow to his lower extremity.  Vascular consult.  Leukocytosis slight increase.  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  Plan- Evaluation on Monday for DC to SNF.  Plan on reassessing wound with bandage takedown on " Monday 6/30. Patient likely okay to go back to facility at that point if stable.  Vascular recommendation-resume Plavix tomorrow.  Follow-up in 3 weeks.  Keep wound clean and covered.   and protector in place.     ESRD -continue HD as scheduled.  Nephrology following.  Creatinine improvement.     Chronic diastolic CHF/hypertension-appears euvolemic/hyperlipidemia.  No signs of exacerbation.  Aspirin . Lipitor.  Coreg . Isordil.  Nifedipine.  Hold Aldactone .  Restart valsartan low-dose.  Hydralazine on hold.  Clonidine was discontinued.  Restart Plavix 7/1/2025 slight.  Procardia .     Chronic constipation -continue bowel regimen.       Anemia of chronic disease -monitor closely.  Received 2 uprbc 6/27/2025.  Hemoglobin stable.  Niferex.  Procrit .      Thrombocytopenia.  Platelet counts normalized.     COPD/no exacerbation.  Singulair.  On room air.     Neuropathy.  Neurontin nightly.     Hypothyroidism.  Synthroid.  Elevated TSH.  Free T4 pending.     Hyponatremia.  Sodium stable     Nausea.  Phenergan as needed.     Constipation.  Giana-Colace.     Prostate hypertrophy.  Flomax.     Advanced age.  75 years old     Pain.  Percocet as needed.  Narcan as needed.     Nutrition . Regular diet.  Boost supplement.     Deconditioning.  PT consult.     Patient is from rehab.     Medical Decision Making  Number and Complexity of problems: Vascular occlusion/dialysis/CHF/chronic constipation/anemia  Differential Diagnosis: None     Conditions and Status        Condition is unchanged.     [facility] Data  External documents reviewed: Previous note .  Cardiac tracing (EKG, telemetry) interpretation: Sinus .  Radiology interpretation: None  Labs reviewed: Laboratory  Any tests that were considered but not ordered: Lab in a.m.     Decision rules/scores evaluated (example [id number], Wells, etc): None     Discussed with: Patient     Care Planning  Shared decision making: Patient  Code status and discussions: Full code      Disposition  Social Determinants of Health that impact treatment or disposition: From rehab  1 to 3 days .           Electronically signed by Garrett Comer MD, 07/01/25, 09:31 CDT.

## 2025-07-01 NOTE — PLAN OF CARE
Goal Outcome Evaluation:  Plan of Care Reviewed With: patient      Progress: improving     Patient A&O x4. No c/o pain this shift thus far. Stump sock in place to R AKA- c/d/I. Permacath c/d/I. Turn q2- refuses. Rm air. VSS. Safety maintained.

## 2025-07-01 NOTE — PROGRESS NOTES
Nephrology ([facility]) Progress Note      Patient:  Ricardo Hugo  YOB: 1950  Date of Service: 7/1/2025  MRN: [id number]   Acct: [id number]   Primary Care Physician: Nathan Zimmer MD  Advance Directive:   Code Status and Medical Interventions: CPR (Attempt to Resuscitate); Full Support   Ordered at: 06/19/25 1944     Code Status (Patient has no pulse and is not breathing):    CPR (Attempt to Resuscitate)     Medical Interventions (Patient has pulse or is breathing):    Full Support     Admit Date: 6/19/2025       Hospital Day: 12  Referring Provider: No Known Provider      Patient personally seen and examined.  Complete chart including Consults, Notes, Operative Reports, Labs, Cardiology, and Radiology studies reviewed as able.    Chief complaint: Abnormal labs.    Subjective:  Ricardo Hugo is a 75 y.o. male  whom we were consulted for end-stage renal disease on maintenance dialysis.  Patient has permacatheter as a dialysis access.  He also has history of peripheral vascular disease, right carotid endarterectomy, PTA of the right external iliac artery, right SFA.  Presenting to the emergency room complaining of right leg pain.  His pain has progressively getting worse and has decided come to the emergency room.  Patient is currently being evaluated by Dr. Zabala for another arteriogram, look at the status of his blood flow to the right leg.  On June 28 patient underwent intravascular lithotripsy of popliteal artery, posterior tibial artery followed by balloon angioplasties.  Patient had no improvement of his right leg circulation despite interventions.  Finally he agreed to proceed with right AKA.  He underwent AKA June 24.    This afternoon patient feels well.  He is complaining of constipation.  He is scheduled to have hemodialysis treatment tomorrow      Allergies:  Ondansetron, Zofran [ondansetron hcl], Lortab [hydrocodone-acetaminophen], and  Allopurinol    Home Meds:  Medications Prior to Admission   Medication Sig Dispense Refill Last Dose/Taking    ALPRAZolam (XANAX) 0.25 MG tablet Take 1 tablet by mouth Every Night.   Taking    aspirin (aspirin) 81 MG EC tablet Take 1 tablet by mouth Daily.   Taking    atorvastatin (LIPITOR) 10 MG tablet Take 1 tablet by mouth Daily. 90 tablet 3 Taking    Budeson-Glycopyrrol-Formoterol (Breztri Aerosphere) 160-9-4.8 MCG/ACT aerosol inhaler Inhale 2 puffs 2 (Two) Times a Day.   Taking    calcitriol (ROCALTROL) 0.5 MCG capsule Take 1 capsule by mouth Daily. 90 capsule 2 Taking    carvedilol (COREG) 12.5 MG tablet Take 1 tablet by mouth 2 (Two) Times a Day With Meals.   Taking    clopidogrel (PLAVIX) 75 MG tablet Take 1 tablet by mouth Daily.   Taking    docusate sodium (COLACE) 100 MG capsule Take 1 capsule by mouth 3 (Three) Times a Day As Needed for Constipation.   Taking As Needed    empagliflozin (Jardiance) 10 MG tablet tablet Take 1 tablet by mouth Daily.   Taking    esomeprazole (nexIUM) 20 MG capsule Take 1 capsule by mouth Every Morning Before Breakfast.   Taking    gabapentin (NEURONTIN) 300 MG capsule Take 1 capsule by mouth 2 (Two) Times a Day.   Taking    hydrALAZINE (APRESOLINE) 100 MG tablet Take 1 tablet by mouth 3 (Three) Times a Day.   Taking    iron polysaccharides (NIFEREX) 150 MG capsule Take 1 capsule by mouth Daily.   Taking    isosorbide dinitrate (ISORDIL) 10 MG tablet Take 1 tablet by mouth 3 (Three) Times a Day. 270 tablet 2 Taking    levothyroxine (Synthroid) 100 MCG tablet Take 1 tablet by mouth Every Morning. 90 tablet 2 Taking    magnesium chloride ER 64 MG DR tablet Take 1 tablet by mouth Daily.   Taking    montelukast (SINGULAIR) 10 MG tablet Take 1 tablet by mouth Every Night.   Taking    Multiple Vitamins-Minerals (PRESERVISION/LUTEIN) capsule Take 1 capsule by mouth 2 (two) times a day.   Taking    NIFEdipine XL (ADALAT CC) 60 MG 24 hr tablet Take 2 tablets by mouth Daily.   Taking     spironolactone (ALDACTONE) 25 MG tablet Take 1 tablet by mouth Daily.   Taking    tamsulosin (FLOMAX) 0.4 MG capsule 24 hr capsule Take 1 capsule by mouth Every Night.   Taking    valsartan (DIOVAN) 320 MG tablet Take 1 tablet by mouth Daily.   Taking    vitamin D (ERGOCALCIFEROL) 1.25 MG (61722 UT) capsule capsule Take 1 capsule by mouth 1 (One) Time Per Week. Mondays   Taking    albuterol sulfate  (90 Base) MCG/ACT inhaler Inhale 2 puffs Every 6 (Six) Hours As Needed for Wheezing or Shortness of Air.       bisacodyl (DULCOLAX) 10 MG suppository Insert 1 suppository into the rectum Daily As Needed for Constipation.       desoximetasone (TOPICORT) 0.25 % cream Apply 1 Application topically to the appropriate area as directed 2 (Two) Times a Day As Needed for Irritation. irritation 60 g 0     fluticasone (FLONASE) 50 MCG/ACT nasal spray Administer 2 sprays into the nostril(s) as directed by provider Daily As Needed for Allergies.       naloxone (NARCAN) 4 MG/0.1ML nasal spray Call 911. Don't prime. [employer] in 1 nostril for overdose. Repeat in 2-3 minutes in other nostril if no or minimal breathing/responsiveness. 2 each 0     polyethylene glycol (MIRALAX) 17 g packet Take 17 g by mouth Daily As Needed (constipation).       senna 8.6 MG tablet Take 2 tablets by mouth Daily As Needed for Constipation.       [DISCONTINUED] cloNIDine (CATAPRES) 0.3 MG tablet Take 1 tablet by mouth 3 times a day. (Patient not taking: Reported on 6/21/2025)   Not Taking    [DISCONTINUED] gabapentin (NEURONTIN) 300 MG capsule Take 1 capsule by mouth Every Night. 24 capsule 0     [DISCONTINUED] omeprazole (priLOSEC) 20 MG capsule Take 1 capsule by mouth Daily. (Patient not taking: Reported on 6/21/2025)   Not Taking    [DISCONTINUED] oxyCODONE-acetaminophen (PERCOCET) 5-325 MG per tablet Take 1 tablet by mouth Every 6 (Six) Hours As Needed for Moderate Pain. 24 tablet 0        Medicines:  Current Facility-Administered Medications    Medication Dose Route Frequency Provider Last Rate Last Admin    acetaminophen (TYLENOL) tablet 650 mg  650 mg Oral Q4H PRN Blayne Zabala MD   650 mg at 06/30/25 0924    Or    acetaminophen (TYLENOL) 160 MG/5ML oral solution 650 mg  650 mg Oral Q4H PRN Blayne Zabala MD        Or    acetaminophen (TYLENOL) suppository 650 mg  650 mg Rectal Q4H PRN Blayne Zabala MD        aspirin EC tablet 81 mg  81 mg Oral Daily Blayne Zabala MD   81 mg at 07/01/25 0928    atorvastatin (LIPITOR) tablet 10 mg  10 mg Oral Nightly Blayne Zabala MD   10 mg at 06/30/25 2021    sennosides-docusate (PERICOLACE) 8.6-50 MG per tablet 2 tablet  2 tablet Oral BID Blayne Zabala MD   2 tablet at 07/01/25 0928    And    polyethylene glycol (MIRALAX) packet 17 g  17 g Oral Daily PRN Blayne Zabala MD        And    bisacodyl (DULCOLAX) EC tablet 5 mg  5 mg Oral Daily PRN Blayne Zabala MD   5 mg at 06/30/25 2022    And    bisacodyl (DULCOLAX) suppository 10 mg  10 mg Rectal Daily PRN Blayne Zabala MD        carvedilol (COREG) tablet 12.5 mg  12.5 mg Oral BID With Meals Jamie Pineda MD   12.5 mg at 07/01/25 0927    clopidogrel (PLAVIX) tablet 75 mg  75 mg Oral Daily Garrett Comer MD   75 mg at 07/01/25 1036    epoetin cecile-epbx (RETACRIT) injection 10,000 Units  10,000 Units Subcutaneous Once per day on Monday Wednesday Friday Blayne Zabala MD   10,000 Units at 06/30/25 1224    gabapentin (NEURONTIN) capsule 300 mg  300 mg Oral Nightly Blayne Zabala MD   300 mg at 06/30/25 2021    hydrALAZINE (APRESOLINE) injection 10 mg  10 mg Intravenous Q6H PRN Bouchra Trinidad MD   10 mg at 06/30/25 0001    [Held by provider] hydrALAZINE (APRESOLINE) tablet 100 mg  100 mg Oral TID Blayne Zabala MD   100 mg at 06/21/25 1941    iron polysaccharides (NIFEREX) capsule 150 mg  150 mg Oral Daily Garrett Comer MD   150 mg at 07/01/25 0928    isosorbide dinitrate (ISORDIL) tablet 10 mg  10 mg Oral TID - Nitrates Vj,  MD Blayne   10 mg at 07/01/25 1357    levothyroxine (SYNTHROID, LEVOTHROID) tablet 100 mcg  100 mcg Oral Q AM Blayne Zabala MD   100 mcg at 07/01/25 0526    montelukast (SINGULAIR) tablet 10 mg  10 mg Oral Nightly Blayne Zabala MD   10 mg at 06/30/25 2022    Morphine sulfate (PF) injection 2 mg  2 mg Intravenous Q4H PRN Blayne Zabala MD   2 mg at 06/30/25 1003    naloxone (NARCAN) injection 0.4 mg  0.4 mg Intravenous Q5 Min PRN Blayne Zabala MD        NIFEdipine XL (PROCARDIA XL) 24 hr tablet 120 mg  120 mg Oral Daily Blayne Zabala MD   120 mg at 06/30/25 1730    nitroglycerin (NITROSTAT) SL tablet 0.4 mg  0.4 mg Sublingual Q5 Min PRN Blayne Zabala MD        oxyCODONE-acetaminophen (PERCOCET)  MG per tablet 1 tablet  1 tablet Oral Q6H PRN Garrett Comer MD   1 tablet at 07/01/25 0928    oxyCODONE-acetaminophen (PERCOCET) 5-325 MG per tablet 1 tablet  1 tablet Oral Q4H PRN Blayne Zabala MD   1 tablet at 06/30/25 0606    promethazine (PHENERGAN) tablet 12.5 mg  12.5 mg Oral Q6H PRN Blayne Zabala MD   12.5 mg at 06/20/25 2243    sodium chloride 0.9 % flush 10 mL  10 mL Intravenous Q12H Blayne Zabala MD   10 mL at 07/01/25 0928    sodium chloride 0.9 % flush 10 mL  10 mL Intravenous PRN Blayne Zabala MD        sodium chloride 0.9 % infusion 40 mL  40 mL Intravenous PRN Blayne Zabala MD        [Held by provider] spironolactone (ALDACTONE) tablet 25 mg  25 mg Oral Daily Blayne Zabala MD   25 mg at 06/22/25 0905    tamsulosin (FLOMAX) 24 hr capsule 0.4 mg  0.4 mg Oral Nightly Blayne Zabala MD   0.4 mg at 06/30/25 2022    [START ON 7/2/2025] valsartan (DIOVAN) tablet 80 mg  80 mg Oral Daily Garrett Comer MD           Past Medical History:  Past Medical History:   Diagnosis Date    3-vessel CAD 08/11/2020    Allergic rhinitis     Anemia     Anxiety disorder 04/27/2020    Arthritis     Asymmetrical sensorineural hearing loss 06/28/2017    Atherosclerosis of native artery of  both lower extremities with intermittent claudication 07/18/2019    Avascular necrosis of femoral head, left 07/11/2020    right hip after surgery    Carotid stenosis     Chronic mucoid otitis media     Chronic rhinitis     COPD (chronic obstructive pulmonary disease)     Coronary artery disease     HEART BYPASS 2004    Crohn's disease of large intestine with other complication 07/30/2020    Chronic diarrhea Colonoscopy July 2020 revealed mild patchy scattered hemosiderin staining with inflammation more so in rectosigmoid area.  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    Iron deficiency anemia 07/14/2020    Mixed hearing loss of left ear     PAD (peripheral artery disease) 08/11/2020    Perianal abscess     Pernicious anemia 08/17/2020    took shots but never diagnosed with b12 deficiency    Personal history of alcoholism 08/11/2020    quit drinking in 2013    Prostatic hypertrophy 08/11/2020    Sensorineural hearing loss     Sepsis with acute renal failure 09/15/2020    Shortness of breath 05/27/2021    Tinnitus     Ventricular tachycardia, nonsustained 07/14/2020    Weight loss 07/11/2020       Past Surgical History:  Past Surgical History:   Procedure Laterality Date    ABOVE KNEE AMPUTATION Right 6/24/2025    Procedure: right above knee amputation;  Surgeon: Blayne Zabala MD;  Location:  [facility] OR;  Service: Vascular;  Laterality: Right;    AORTOGRAM Right 4/25/2025    Procedure: RIGHT LOWER EXTREMITY ANGIOGRAM, SHOCKWAVE LITHOTRIPSY, BALLOON ANGIOPLASTY, MYNX CLOSURE;  Surgeon: Gil Pineda DO;  Location:  [facility] HYBRID OR;  Service: Vascular;   Laterality: Right;    AORTOGRAM Left 5/22/2025    Procedure: LEFT LOWER EXTREMITY ANGIOGRAM, SHOCKWAVE LITHOTRIPSY, BALLOON ANGIOPLASTY, STENT PLACEMENT, MYNX CLOSURE;  Surgeon: Blayne Zabala MD;  Location: [facility] HYBRID OR;  Service: Vascular;  Laterality: Left;    AORTOGRAM Right 5/30/2025    Procedure: AORTOILIAC ANGIOGRAM WITH LEFT LOWER EXTREMITY ANGIOGRAM;  Surgeon: Gil Pineda DO;  Location: [facility] HYBRID OR;  Service: Vascular;  Laterality: Right;    AORTOGRAM Right 6/20/2025    Procedure: right lower extremity arteriogram, shockwave lithotripsy, balloon angioplasty, mynx closue;  Surgeon: Blayne Zabala MD;  Location: [facility] HYBRID OR;  Service: Vascular;  Laterality: Right;    ARTERY SURGERY  2021    right side on neck    CAROTID ENDARTERECTOMY Right 05/10/2021    Procedure: RIGHT CAROTID ENDARTERECTOMY WITH EEG;  Surgeon: Gil Pineda DO;  Location: [facility] HYBRID OR 12;  Service: Vascular;  Laterality: Right;    COLONOSCOPY N/A 07/02/2020    Procedure: COLONOSCOPY WITH ANESTHESIA;  Surgeon: Adrien Brewster MD;  Location: [facility] ENDOSCOPY;  Service: Gastroenterology;  Laterality: N/A;  pre op: diarrhea  post op: polyps  PCP: Joe Velasco MD    COLONOSCOPY N/A 10/13/2020    Procedure: COLONOSCOPY WITH ANESTHESIA;  Surgeon: Adrien Brewster MD;  Location: [facility] ENDOSCOPY;  Service: Gastroenterology;  Laterality: N/A;  Pre: Chronic Diarrhea, Crohn's  Post: AVM  Dr. Neftali Velasco  CO2 Inflation Used    COLONOSCOPY N/A 12/08/2023    Procedure: COLONOSCOPY WITH ANESTHESIA;  Surgeon: Adrien Brewster MD;  Location: [facility] ENDOSCOPY;  Service: Gastroenterology;  Laterality: N/A;  pre chrone's disease  post sub optimal prep, polyp, chrone's      CORONARY ARTERY BYPASS GRAFT  2003    x3    ENDOSCOPY N/A 11/02/2021    Procedure: ESOPHAGOGASTRODUODENOSCOPY WITH ANESTHESIA;  Surgeon: Bridger Bell MD;  Location: [facility] ENDOSCOPY;  Service: Gastroenterology;  Laterality: N/A;   pre anemia;gi bleed  post  gi bleed;fabian Velasco    ENDOSCOPY N/A 10/10/2023    Procedure: ESOPHAGOGASTRODUODENOSCOPY WITH ANESTHESIA;  Surgeon: Adrien Brewster MD;  Location: [facility] ENDOSCOPY;  Service: Gastroenterology;  Laterality: N/A;  preop; anemia  postop esophagitis ; R/O barretts   PCP Randall Beata    EYE SURGERY Bilateral     catorac    INCISION AND DRAINAGE PERIRECTAL ABSCESS N/A 2017    Procedure: INCISION AND DRAINAGE OF JEET ANAL ABSCESS;  Surgeon: Lynette Smith MD;  Location: [facility] OR;  Service:     INGUINAL HERNIA REPAIR Bilateral 2023    Procedure: INGUINAL HERNIA BILATERAL REPAIR LAPAROSCOPIC WITH DAVINCI ROBOT WITH MESH;  Surgeon: Tahira Rivera MD;  Location: [facility] OR;  Service: Robotics - DaVinci;  Laterality: Bilateral;    INSERTION HEMODIALYSIS CATHETER N/A 2025    Procedure: HEMODIALYSIS CATHETER PLACEMENT;  Surgeon: Gil Pineda DO;  Location: [facility] HYBRID OR;  Service: Vascular;  Laterality: N/A;    MYRINGOTOMY W/ TUBES Left 2017    06/10/2016    TONSILLECTOMY      TOTAL HIP ARTHROPLASTY Right        Family History  Family History   Problem Relation Age of Onset    Breast cancer Mother     Dementia Father     Glaucoma Father     No Known Problems Daughter     Colon polyps Neg Hx     Colon cancer Neg Hx        Social History  Social History     Socioeconomic History    Marital status:    Tobacco Use    Smoking status: Former     Current packs/day: 0.00     Average packs/day: 0.5 packs/day for 25.8 years (12.9 ttl pk-yrs)     Types: Cigarettes     Start date:      Quit date: 10/13/2013     Years since quittin.7     Passive exposure: Past    Smokeless tobacco: Never    Tobacco comments:     quit 2013   Vaping Use    Vaping status: Former    Substances: Nicotine    Devices: Pre-filled or refillable cartridge   Substance and Sexual Activity    Alcohol use: Not Currently    Drug use: No    Sexual activity: Not  Currently     Partners: Female       Review of Systems:  History obtained from chart review and the patient  General ROS: No fever or chills  Respiratory ROS: No cough, shortness of breath, wheezing  Cardiovascular ROS: No chest pain or palpitations  Gastrointestinal ROS: No abdominal pain or melena  Genito-Urinary ROS: No dysuria or hematuria  Psych ROS: No anxiety and depression  14 point ROS reviewed with the patient and negative except as noted above and in the HPI unless unable to obtain.    Objective:  Patient Vitals for the past 24 hrs:   BP Temp Temp src Pulse Resp SpO2 Weight   07/01/25 1247 129/61 97.7 °F (36.5 °C) Oral 68 16 100 % --   07/01/25 0810 142/52 97.6 °F (36.4 °C) Oral 71 18 95 % --   07/01/25 0509 154/47 98.6 °F (37 °C) Oral 73 16 95 % 35.5 kg (78 lb 4.8 oz)   06/30/25 2019 128/49 98.7 °F (37.1 °C) Oral 65 16 99 % --   06/30/25 1523 123/42 97.2 °F (36.2 °C) Oral 68 16 100 % --       Intake/Output Summary (Last 24 hours) at 7/1/2025 1404  Last data filed at 7/1/2025 0900  Gross per 24 hour   Intake 120 ml   Output 500 ml   Net -380 ml     General: awake/alert   HEENT: Normocephalic atraumatic head  Neck: Supple with no JVD or carotid bruits.  Chest:  clear to auscultation bilaterally without respiratory distress  CVS: regular rate and rhythm  Abdominal: soft, nontender, positive bowel sounds  Extremities: Right AKA.  Skin: warm and dry without rash      Labs:  Results from last 7 days   Lab Units 07/01/25  0451 06/30/25  0402 06/29/25  0310   WBC 10*3/mm3 15.52* 12.42* 8.10   HEMOGLOBIN g/dL 8.7* 8.5* 8.8*   HEMATOCRIT % 27.0* 26.6* 27.0*   PLATELETS 10*3/mm3 148 135* 127*         Results from last 7 days   Lab Units 07/01/25  0451 06/30/25  0402 06/29/25  0310 06/28/25  0405 06/26/25  0531   SODIUM mmol/L 136 133* 135*   < > 132*   POTASSIUM mmol/L 4.1 4.9 4.4   < > 4.1   CHLORIDE mmol/L 101 100 98   < > 99   CO2 mmol/L 23.0 21.0* 26.0   < > 22.0   BUN mg/dL 35.9* 52.4* 39.3*   < > 18.7  "  CREATININE mg/dL 2.40* 3.04* 2.77*   < > 1.98*   CALCIUM mg/dL 8.6 8.7 8.7   < > 8.6   EGFR mL/min/1.73 27.1* 20.4* 22.8*   < > 34.2*   BILIRUBIN mg/dL 0.4 0.4  --   --  0.6   ALK PHOS U/L 229* 206*  --   --  149*   ALT (SGPT) U/L 20 19  --   --  <5   AST (SGOT) U/L 36 38  --   --  34   GLUCOSE mg/dL 100* 99 97   < > 113*    < > = values in this interval not displayed.       Radiology:   Imaging Results (Last 72 Hours)       ** No results found for the last 72 hours. **            Culture:  No results found for: \"BLOODCX\", \"URINECX\", \"WOUNDCX\", \"MRSACX\", \"RESPCX\", \"STOOLCX\"      Assessment   1.  End-stage renal disease on maintenance dialysis.  2.  Hypertensive nephrosclerosis.  3.  Severe peripheral vascular disease, status post right AKA  4.  Anemia of chronic kidney disease.  5.  History of carotid occlusive disease    Plan:  1.  Arrange hemodialysis treatment tomorrow.  2.  Continue ABILIO.  3.  Initiate MiraLAX 17 g p.o. daily.      Giuliano Saldana MD  7/1/2025  14:04 CDT    "

## 2025-07-02 LAB
ALBUMIN SERPL-MCNC: 2.8 G/DL (ref 3.5–5.2)
ALBUMIN/GLOB SERPL: 1.1 G/DL
ALP SERPL-CCNC: 252 U/L (ref 39–117)
ALT SERPL W P-5'-P-CCNC: 24 U/L (ref 1–41)
ANION GAP SERPL CALCULATED.3IONS-SCNC: 12 MMOL/L (ref 5–15)
AST SERPL-CCNC: 34 U/L (ref 1–40)
BASOPHILS # BLD AUTO: 0.06 10*3/MM3 (ref 0–0.2)
BASOPHILS NFR BLD AUTO: 0.4 % (ref 0–1.5)
BILIRUB SERPL-MCNC: 0.4 MG/DL (ref 0–1.2)
BUN SERPL-MCNC: 47.1 MG/DL (ref 8–23)
BUN/CREAT SERPL: 15.8 (ref 7–25)
CALCIUM SPEC-SCNC: 8.9 MG/DL (ref 8.6–10.5)
CHLORIDE SERPL-SCNC: 100 MMOL/L (ref 98–107)
CO2 SERPL-SCNC: 21 MMOL/L (ref 22–29)
CREAT SERPL-MCNC: 2.99 MG/DL (ref 0.76–1.27)
DEPRECATED RDW RBC AUTO: 59.1 FL (ref 37–54)
EGFRCR SERPLBLD CKD-EPI 2021: 20.8 ML/MIN/1.73
EOSINOPHIL # BLD AUTO: 0.27 10*3/MM3 (ref 0–0.4)
EOSINOPHIL NFR BLD AUTO: 1.9 % (ref 0.3–6.2)
ERYTHROCYTE [DISTWIDTH] IN BLOOD BY AUTOMATED COUNT: 16.7 % (ref 12.3–15.4)
GLOBULIN UR ELPH-MCNC: 2.6 GM/DL
GLUCOSE SERPL-MCNC: 96 MG/DL (ref 65–99)
HCT VFR BLD AUTO: 27.7 % (ref 37.5–51)
HGB BLD-MCNC: 8.9 G/DL (ref 13–17.7)
IMM GRANULOCYTES # BLD AUTO: 0.19 10*3/MM3 (ref 0–0.05)
IMM GRANULOCYTES NFR BLD AUTO: 1.3 % (ref 0–0.5)
LYMPHOCYTES # BLD AUTO: 1.14 10*3/MM3 (ref 0.7–3.1)
LYMPHOCYTES NFR BLD AUTO: 7.9 % (ref 19.6–45.3)
MCH RBC QN AUTO: 31.4 PG (ref 26.6–33)
MCHC RBC AUTO-ENTMCNC: 32.1 G/DL (ref 31.5–35.7)
MCV RBC AUTO: 97.9 FL (ref 79–97)
MONOCYTES # BLD AUTO: 0.98 10*3/MM3 (ref 0.1–0.9)
MONOCYTES NFR BLD AUTO: 6.8 % (ref 5–12)
NEUTROPHILS NFR BLD AUTO: 11.78 10*3/MM3 (ref 1.7–7)
NEUTROPHILS NFR BLD AUTO: 81.7 % (ref 42.7–76)
NRBC BLD AUTO-RTO: 0 /100 WBC (ref 0–0.2)
PLATELET # BLD AUTO: 156 10*3/MM3 (ref 140–450)
PMV BLD AUTO: 9.8 FL (ref 6–12)
POTASSIUM SERPL-SCNC: 4.6 MMOL/L (ref 3.5–5.2)
PROT SERPL-MCNC: 5.4 G/DL (ref 6–8.5)
RBC # BLD AUTO: 2.83 10*6/MM3 (ref 4.14–5.8)
SODIUM SERPL-SCNC: 133 MMOL/L (ref 136–145)
WBC NRBC COR # BLD AUTO: 14.42 10*3/MM3 (ref 3.4–10.8)

## 2025-07-02 PROCEDURE — 85025 COMPLETE CBC W/AUTO DIFF WBC: CPT | Performed by: FAMILY MEDICINE

## 2025-07-02 PROCEDURE — 80053 COMPREHEN METABOLIC PANEL: CPT | Performed by: INTERNAL MEDICINE

## 2025-07-02 PROCEDURE — 25010000002 EPOETIN ALFA-EPBX 10000 UNIT/ML SOLUTION: Performed by: STUDENT IN AN ORGANIZED HEALTH CARE EDUCATION/TRAINING PROGRAM

## 2025-07-02 RX ORDER — HYDRALAZINE HYDROCHLORIDE 100 MG/1
50 TABLET, FILM COATED ORAL 3 TIMES DAILY
Start: 2025-07-02

## 2025-07-02 RX ADMIN — ASPIRIN 81 MG: 81 TABLET, COATED ORAL at 09:47

## 2025-07-02 RX ADMIN — Medication 150 MG: at 09:47

## 2025-07-02 RX ADMIN — POLYETHYLENE GLYCOL 3350 17 G: 17 POWDER, FOR SOLUTION ORAL at 09:47

## 2025-07-02 RX ADMIN — EPOETIN ALFA-EPBX 10000 UNITS: 10000 INJECTION, SOLUTION INTRAVENOUS; SUBCUTANEOUS at 09:48

## 2025-07-02 RX ADMIN — DOCUSATE SODIUM 50 MG AND SENNOSIDES 8.6 MG 2 TABLET: 8.6; 5 TABLET, FILM COATED ORAL at 09:47

## 2025-07-02 RX ADMIN — TAMSULOSIN HYDROCHLORIDE 0.4 MG: 0.4 CAPSULE ORAL at 21:18

## 2025-07-02 RX ADMIN — Medication 10 ML: at 21:18

## 2025-07-02 RX ADMIN — GABAPENTIN 300 MG: 300 CAPSULE ORAL at 21:18

## 2025-07-02 RX ADMIN — LEVOTHYROXINE SODIUM 100 MCG: 0.1 TABLET ORAL at 05:02

## 2025-07-02 RX ADMIN — Medication 10 ML: at 09:50

## 2025-07-02 RX ADMIN — OXYCODONE AND ACETAMINOPHEN 1 TABLET: 5; 325 TABLET ORAL at 21:21

## 2025-07-02 RX ADMIN — ATORVASTATIN CALCIUM 10 MG: 10 TABLET, FILM COATED ORAL at 21:18

## 2025-07-02 RX ADMIN — OXYCODONE AND ACETAMINOPHEN 1 TABLET: 325; 10 TABLET ORAL at 05:39

## 2025-07-02 RX ADMIN — MONTELUKAST SODIUM 10 MG: 10 TABLET, COATED ORAL at 21:18

## 2025-07-02 RX ADMIN — CARVEDILOL 12.5 MG: 6.25 TABLET, FILM COATED ORAL at 21:18

## 2025-07-02 RX ADMIN — CLOPIDOGREL BISULFATE 75 MG: 75 TABLET, FILM COATED ORAL at 09:47

## 2025-07-02 NOTE — DISCHARGE SUMMARY
[facility]  DISCHARGE SUMMARY       Date of Admission: 6/19/2025  Date of Discharge:  7/3/2025  Primary Care Physician: Nathan Zimmer MD    Presenting Problem/History of Present Illness:      Final Discharge Diagnoses:  Active Hospital Problems    Diagnosis     **Vascular occlusion     COPD (chronic obstructive pulmonary disease)     Severe protein-calorie malnutrition     Chronic heart failure with preserved ejection fraction (HFpEF)     Thrombocytopenia     ESRD (end stage renal disease) on dialysis     Chronic diastolic (congestive) heart failure     Sensorineural hearing loss (SNHL), bilateral     Eustachian tube dysfunction, bilateral     Mixed hyperlipidemia     CLL (chronic lymphocytic leukemia)     CKD (chronic kidney disease) stage 5     Diastolic dysfunction     Resistant hypertension     Peripheral arterial disease     Essential hypertension        Consults: Oncology . nephrology.  Vascular surgery    Procedures Performed:   PROCEDURE PERFORMED:   Right above knee amputation    Pertinent Test Results:   Results for orders placed during the hospital encounter of 01/10/25    Adult Transthoracic Echo Complete w/ Color, Spectral and Contrast if Necessary Per Protocol    Interpretation Summary    Left ventricular systolic function is normal. Left ventricular ejection fraction appears to be 56 - 60%.    Left ventricular wall thickness is consistent with mild septal asymmetric hypertrophy.    Left ventricular diastolic function is consistent with (grade II w/high LAP) pseudonormalization.    Normal right ventricular cavity size and systolic function noted.    The left atrial cavity is mild to moderately dilated.    The right atrial cavity is dilated.    Mild aortic valve stenosis is present.    Mild to moderate mitral valve regurgitation is present.    Moderate to severe tricuspid valve regurgitation is present.    Estimated right ventricular  systolic pressure from tricuspid regurgitation is markedly elevated (>55 mmHg).      Imaging Results (All)       Procedure Component Value Units Date/Time    IR Angiogram Extremity [[id number]] Resulted: 06/23/25 1608     Updated: 06/23/25 1608    FL C Arm During Surgery [[id number]] Resulted: 06/20/25 1710     Updated: 06/20/25 1710    Narrative:      This procedure was auto-finalized with no dictation required.    US Arterial Doppler Lower Extremity Right [[id number]] Collected: 06/19/25 1819     Updated: 06/19/25 1824    Narrative:      History: PAD     Comments: Grayscale imaging as well as color flow duplex were used to  evaluate the right lower extremity arterial system     On the right, the peak systolic velocity in the common femoral artery  measured 69 cm/s. In the profunda femoris artery measured 47.6 cm/s. In  the proximal SFA measured 69 cm/s. The mid SFA measured 56.3 cm/s. In  the distal SFA measured 57.9 cm/s. In the popliteal artery measured 31.2  cm/s. In the posterior tibial artery measured 34.9 cm/s. In the anterior  tibial artery measured 22 cm/s.          Impression:      No hemodynamically significant stenosis identified in the  right lower extremity. There is near continuous flow in the tibial  vessels which indicates a significant stenosis versus occlusion of the  more proximal arteries. There is monophasic flow in the right common  femoral artery which may indicate more proximal stenosis.     This report was signed and finalized on 6/19/2025 6:21 PM by Blayne Zabala.       XR Chest 1 View [[id number]] Collected: 06/19/25 1546     Updated: 06/19/25 1552    Narrative:      EXAM: XR CHEST 1 VW-         HISTORY: work up       COMPARISON: 5/29/2025.     TECHNIQUE:  Frontal and lateral views of the chest submitted.     FINDINGS:    There is increasing opacity over the right hemithorax may represent  increasing layering right pleural effusion. Underlying pneumonia not  excluded. There is probable  volume overload. A right IJ tunneled central  venous dialysis catheter tip is at the SVC. The patient is status post  median sternotomy and CABG. There is calcification of the thoracic  aorta. No left effusion is seen and no pneumothorax identified. There is  sclerosis at the right humeral head may represent avascular necrosis.          Impression:      1. Increasing diffuse opacity of the right hemithorax may represent  increasing layering right pleural effusion and probable volume overload.     This report was signed and finalized on 6/19/2025 3:48 PM by Eleuterio Rivera.             LAB RESULTS:      Lab 07/03/25 0359 07/02/25 0747 07/01/25 0451 06/30/25  0402 06/29/25  0310 06/28/25  0405 06/27/25  1535   WBC 8.58 14.42* 15.52* 12.42* 8.10   < > 7.80   HEMOGLOBIN 8.2* 8.9* 8.7* 8.5* 8.8*   < > 6.4*   HEMATOCRIT 25.4* 27.7* 27.0* 26.6* 27.0*   < > 19.6*   PLATELETS 160 156 148 135* 127*   < > 125*   NEUTROS ABS 6.21 11.78* 12.74* 9.35*  --   --  5.99   IMMATURE GRANS (ABS) 0.11* 0.19* 0.15* 0.14*  --   --   --    LYMPHS ABS 1.11 1.14 1.12 1.41  --   --   --    MONOS ABS 0.68 0.98* 1.18* 1.09*  --   --   --    EOS ABS 0.40 0.27 0.26 0.34  --   --  0.08   MCV 97.3* 97.9* 96.1 98.2* 96.4   < > 100.5*    < > = values in this interval not displayed.         Lab 07/03/25 0359 07/02/25 0747 07/01/25 0451 06/30/25 0402 06/29/25  0310   SODIUM 134* 133* 136 133* 135*   POTASSIUM 3.6 4.6 4.1 4.9 4.4   CHLORIDE 101 100 101 100 98   CO2 24.0 21.0* 23.0 21.0* 26.0   ANION GAP 9.0 12.0 12.0 12.0 11.0   BUN 23.0 47.1* 35.9* 52.4* 39.3*   CREATININE 1.82* 2.99* 2.40* 3.04* 2.77*   EGFR 37.8* 20.8* 27.1* 20.4* 22.8*   GLUCOSE 93 96 100* 99 97   CALCIUM 8.6 8.9 8.6 8.7 8.7   HEMOGLOBIN A1C  --   --   --  4.90  --    TSH  --   --   --  9.780*  --          Lab 07/03/25  0359 07/02/25  0747 07/01/25  0451 06/30/25  0402   TOTAL PROTEIN 5.5* 5.4* 5.5* 5.3*   ALBUMIN 2.8* 2.8* 2.8* 2.8*   GLOBULIN 2.7 2.6 2.7 2.5   ALT (SGPT)  32 24 20 19   AST (SGOT) 40 34 36 38   BILIRUBIN 0.4 0.4 0.4 0.4   ALK PHOS 253* 252* 229* 206*             Lab 06/30/25  0402   CHOLESTEROL 78   LDL CHOL 33   HDL CHOL 26*   TRIGLYCERIDES 93         Lab 06/30/25  0402 06/29/25  0310 06/27/25  1511   IRON  --  24*  --    IRON SATURATION (TSAT)  --  14*  --    TIBC  --  174*  --    TRANSFERRIN  --  117*  --    FERRITIN  --  744.50*  --    FOLATE 7.27  --   --    VITAMIN B 12 446  --   --    ABO TYPING  --   --  O   RH TYPING  --   --  Negative   ANTIBODY SCREEN  --   --  Negative         Brief Urine Lab Results  (Last result in the past 365 days)        Color   Clarity   Blood   Leuk Est   Nitrite   Protein   CREAT   Urine HCG        04/20/25 1509             22.8               Microbiology Results (last 10 days)       ** No results found for the last 240 hours. **        History of Present Illness  75-year-old male with past medical history of coronary artery disease/CVA EEG, peripheral vascular disease, carotid stenosis, end-stage renal disease, COPD there is referred to the hospital due to pain in right lower extremity.  He has had pain for several months and has had intervention in this extremity in the extremity in the past by vascular surgeons.  He is considered to have an arterial obstruction distal with some skin breakdown in toes.  He is admitted for vascular surgery evaluation.    Hospital Course:   Vascular occlusion - With intervention on 6/22 right popliteal artery and peroneal artery.  Also had PTA to the right posterior tibial artery.  Was on heparin drip preprocedure. Post procedure on aspirin, Plavix, Lipitor.  Unfortunately despite attempts at revascularization patient continued to have PAD with poor blood flow to his lower extremity.  Vascular consult.  Leukocytosis decreased.  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  Plan- Evaluation on Monday for DC to SNF.  Plan on reassessing wound with bandage takedown on Monday 6/30. Patient likely okay to go back to  "facility at that point if stable.  Vascular recommendation-resume Plavix tomorrow.  Follow-up in 3 weeks.  Keep wound clean and covered.   and protector in place.     ESRD -continue HD as scheduled.  Nephrology following.  Creatinine slight increase.     Chronic diastolic CHF/hypertension-appears euvolemic/hyperlipidemia.  No signs of exacerbation.  Aspirin . Lipitor.  Coreg . Isordil.  Nifedipine.  Hold Aldactone .  Restart valsartan low-dose.  Hydralazine on hold.  Clonidine was discontinued.  Restart Plavix 7/1/2025 slight.  Procardia .     Chronic constipation -continue bowel regimen.       Anemia of chronic disease -monitor closely.  Received 2 uprbc 6/27/2025.  Hemoglobin stable.  Niferex.  Procrit .      Thrombocytopenia.  Platelet counts normalized.     COPD/no exacerbation.  Singulair.  On room air.     Neuropathy.  Neurontin nightly.     Hypothyroidism.  Synthroid.  Elevated TSH.  Free T4 pending.     Hyponatremia.  Sodium stable     Nausea.  Phenergan as needed.     Constipation.  Giana-Colace.     Prostate hypertrophy.  Flomax.     Advanced age.  75 years old     Pain.  Percocet as needed.  Narcan as needed.     Nutrition . Regular diet.  Boost supplement.     Deconditioning.  PT consult.     Vital signs stable, afebrile.  Patient plan for dialysis today.  Patient go to rehab after dialysis.  Follow-up rehab physician as soon as able.  Continue dialysis outpatient.  Follow-up with vascular in about 3 weeks.    Physical Exam on Discharge:  /43 (BP Location: Right arm, Patient Position: Lying)   Pulse 66   Temp 98.4 °F (36.9 °C) (Oral)   Resp 16   Ht 182.9 cm (72\")   Wt 36.7 kg (81 lb)   SpO2 93%   BMI 10.99 kg/m²   Physical Exam  Vitals and nursing note reviewed.   Constitutional:       Comments: Advanced age.  Chronically ill.  Cachectic.   HENT:      Head: Normocephalic.   Eyes:      Conjunctiva/sclera: Conjunctivae normal.      Pupils: Pupils are equal, round, and reactive to light. "   Cardiovascular:      Rate and Rhythm: Normal rate and regular rhythm.      Heart sounds: Normal heart sounds.   Pulmonary:      Effort: Pulmonary effort is normal. No respiratory distress.      Breath sounds: Normal breath sounds.      Comments: On room air.  Abdominal:      General: Bowel sounds are normal. There is no distension.      Palpations: Abdomen is soft.      Tenderness: There is no abdominal tenderness.   Musculoskeletal:         General: No swelling.      Cervical back: Neck supple.      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   Skin:     General: Skin is warm and dry.      Findings: No rash.   Neurological:      Mental Status: He is alert and oriented to person, place, and time.      Motor: Weakness present.      Coordination: Coordination abnormal.      Gait: Gait abnormal.   Psychiatric:         Mood and Affect: Mood normal.         Behavior: Behavior normal.         Thought Content: Thought content normal.     Condition on Discharge: Stable.    Discharge Disposition:  Skilled Nursing Facility (DC - External)    Discharge Medications:     Discharge Medications        New Medications        Instructions Start Date   acetaminophen 325 MG tablet  Commonly known as: TYLENOL   650 mg, Oral, Every 4 Hours PRN             Changes to Medications        Instructions Start Date   gabapentin 300 MG capsule  Commonly known as: NEURONTIN  What changed: Another medication with the same name was removed. Continue taking this medication, and follow the directions you see here.   300 mg, Oral, Nightly      hydrALAZINE 100 MG tablet  Commonly known as: APRESOLINE  What changed: how much to take   50 mg, Oral, 3 Times Daily             Continue These Medications        Instructions Start Date   albuterol sulfate  (90 Base) MCG/ACT inhaler  Commonly known as: PROVENTIL HFA;VENTOLIN HFA;PROAIR HFA   2 puffs, Every 6 Hours PRN      aspirin 81 MG EC tablet   81 mg, Daily       atorvastatin 10 MG tablet  Commonly known as: LIPITOR   10 mg, Oral, Daily      bisacodyl 10 MG suppository  Commonly known as: DULCOLAX   10 mg, Rectal, Daily PRN      Breztri Aerosphere 160-9-4.8 MCG/ACT aerosol inhaler  Generic drug: Budeson-Glycopyrrol-Formoterol   2 puffs, 2 Times Daily      calcitriol 0.5 MCG capsule  Commonly known as: ROCALTROL   0.5 mcg, Oral, Daily      carvedilol 12.5 MG tablet  Commonly known as: COREG   12.5 mg, Oral, 2 Times Daily With Meals      clopidogrel 75 MG tablet  Commonly known as: PLAVIX   75 mg, Daily      desoximetasone 0.25 % cream  Commonly known as: TOPICORT   1 Application, Topical, 2 Times Daily PRN, irritation      docusate sodium 100 MG capsule  Commonly known as: COLACE   100 mg, 3 Times Daily PRN      esomeprazole 20 MG capsule  Commonly known as: nexIUM   20 mg, Every Morning Before Breakfast      fluticasone 50 MCG/ACT nasal spray  Commonly known as: FLONASE   2 sprays, Nasal, Daily PRN      iron polysaccharides 150 MG capsule  Commonly known as: NIFEREX   150 mg, Oral, Daily      isosorbide dinitrate 10 MG tablet  Commonly known as: ISORDIL   10 mg, Oral, 3 Times Daily (Nitrates)      Jardiance 10 MG tablet tablet  Generic drug: empagliflozin   10 mg, Daily      levothyroxine 100 MCG tablet  Commonly known as: Synthroid   100 mcg, Oral, Every Early Morning      magnesium chloride ER 64 MG DR tablet   64 mg, Daily      montelukast 10 MG tablet  Commonly known as: SINGULAIR   10 mg, Nightly      naloxone 4 MG/0.1ML nasal spray  Commonly known as: NARCAN   Call 911. Don't prime. [place] in 1 nostril for overdose. Repeat in 2-3 minutes in other nostril if no or minimal breathing/responsiveness.      NIFEdipine CC 60 MG 24 hr tablet  Commonly known as: ADALAT CC   120 mg, Oral, Daily      polyethylene glycol 17 g packet  Commonly known as: MIRALAX   17 g, Oral, Daily PRN      PreserVision/Lutein capsule   1 capsule, 2 times daily      senna 8.6 MG tablet  Commonly  known as: SENOKOT   2 tablets, Oral, Daily PRN      tamsulosin 0.4 MG capsule 24 hr capsule  Commonly known as: FLOMAX   1 capsule, Nightly      valsartan 320 MG tablet  Commonly known as: DIOVAN   320 mg, Oral, Daily      vitamin D 1.25 MG (24187 UT) capsule capsule  Commonly known as: ERGOCALCIFEROL   50,000 Units, Weekly             Stop These Medications      ALPRAZolam 0.25 MG tablet  Commonly known as: XANAX     cloNIDine 0.3 MG tablet  Commonly known as: CATAPRES     omeprazole 20 MG capsule  Commonly known as: priLOSEC     oxyCODONE-acetaminophen 5-325 MG per tablet  Commonly known as: PERCOCET     spironolactone 25 MG tablet  Commonly known as: ALDACTONE            ASK your doctor about these medications        Instructions Start Date   oxyCODONE-acetaminophen 5-325 MG per tablet  Commonly known as: PERCOCET  Ask about: Should I take this medication?   1 tablet, Oral, Every 6 Hours PRN                 Discharge Diet:   Diet Instructions       Advance Diet As Tolerated -Target Diet: Regular diet.      Target Diet: Regular diet.            Activity at Discharge:   Activity Instructions       Activity as Tolerated              Follow-up Appointments:   Future Appointments   Date Time Provider Department Center   7/18/2025 10:00 AM Melanie Ayala APRN [facility] PAD   9/23/2025 10:15 AM Rip Mcguire DO [facility] PAD PAD   11/4/2025 10:00 AM PAD US [facility] CART 1  PAD [facility] PAD   11/4/2025 11:00 AM PAD US NIVAS CART 1  PAD [facility] PAD   11/6/2025 10:15 AM Elena Jon APRN [facility] PAD       Follow-up rehab physician as soon as able.  Follow-up with vascular in 3 weeks.    Electronically signed by Garrett Comer MD, 07/03/25, 07:52 CDT.    Time: Greater than 30 minutes.

## 2025-07-02 NOTE — NURSING NOTE
[facility]  DIALYSIS TREATMENT SUMMARY     Note: Consult with the attending physician for patient treatment orders, this document is not a physician order.     Patient Information  Patient Ricardo Hugo  Date of Birth February 22, 1950  Chart Number [id number]  Location [facility]  Location MRN [id number]  Gender Male  SSN (last 4)   Treatment Information  Treatment Type Hemodialysis  Treatment Id [id number]  Start Time July 02, 2025 12:41  End Time July 02, 2025 16:11  Acutal Duration 03:30  Treatment Balances  Total Saline Administered 500  Net Fluid Balance -1500  Hemodialysis Orders  Therapy Standard  Orders Verified Time 07/02/2025 07:00   Date Verified 07/02/2025  Duration 3:30  Isolated UF/Bypass No  BFR (mL) 450  DFR (mL) 700  Dialyzer Type OPTIFLUX 160NR  UF Order UF Goal Ordered  UF Goal Ordered (mL) 1500  Crit-Line used No  Heparin Initial Units Bolus No  Heparin IV Maintenance Bolus No  Heparin IV Infusion No  Potassium (mEq/L) 2.0  Calcium (mEq/L) 2.5  Bicarb (mg/dL) 35  Sodium (mEq/L) 137  Clinician Erika Hines RN  Dialysis Access  Access Type Central Venous Catheter  Central Venous Catheter  Access Type Catheter - Tunneled  Access Location Chest Wall - R  Current/New [facility] Patient Yes  1st Use Catheter Verified by Previous Use  Catheter Care Completed per Policy Yes  Dressing Dry and Intact on Arrival Yes  Dressing Changed Yes  Type of Dressing Film Biopatch/CHG  Dressing Changed By [facility] Staff  Type of HD Caps Curos  HD Caps Changed Yes  CVC Line Education Provided Yes - Infection Prevention  Vitals  Pre-Treatment Vitals  Time Is BP being recorded? Pre BP Map BP Method Pulse RR Temp How was Weight Obtained? Pre Weight Previous Dry Weight Previous Post Weight Metric Target Fluid Removal (mL) Dialysate Confirmed Clinician  07/02/2025 12:40 BP/Map 135/57 (83) Noninvasive 68 17 97.8 How Obtained: Bed Scale 39.6 - - Kgs 2000 Yes Erika Hines  RN  Comments: Patient treatment in dialysis suite. Patient responds to HD staff appropriately. Patient denies pain or discomfort at this time.  Intra Procedure Vitals  Rec Type Time Is BP being recorded? BP Map Pulse RR BFR DFR AP  TMP UFR RMVD Bolus NSS Flush Chills Safety Check Crit-Line HCT Crit-Line BV% Clinician  E 07/02/2025 12:41 BP/Map 136/62 (87) 63 16 450 700 -180 140 30 570 0 Yes 250  Yes   Erika Hines RN  Comments: HD started via (R) IJ perm catheter, venous clips applied, prime given, lines secured. Machine alarms passed, DFR at ordered rate. Treatment in progress.  E 07/02/2025 13:00 BP/Map 138/55 (83) 64 16 450 700 -190 140 30 570 221    Yes   Erika Hines, DEMETRIUS  Comments: HD access visible, venous clips present , lines secured. Patient resting quietly. Treatment in progress; will continue with current treatment plan at this time.  E 07/02/2025 13:30 BP/Map 126/62 (83) 69 16 450 700 -190 140 30 570 481    Yes   Erika Hines, DEMETRIUS  Comments: HD access visible, venous clips present , lines secured. Patient resting quietly. Treatment in progress; will continue with current treatment plan at this time.  E 07/02/2025 14:00 BP/Map 153/72 (99) 65 16 450 700 -190 140 30 570 759    Yes   Erika Hines, DEMETRIUS  Comments: HD access visible, venous clips present , lines secured. Patient resting quietly. Treatment in progress; will continue with current treatment plan at this time.  E 07/02/2025 14:30 BP/Map 157/56 (90) 67 16 450 700 -190 140 30 570 1087    Yes   Erika Hines, DEMETRIUS  Comments: HD access visible, venous clips present , lines secured. Patient resting quietly. Treatment in progress; will continue with current treatment plan at this time.  E 07/02/2025 15:00 BP/Map 174/56 (95) 67 16 450 700 -190 150 30 570 1351    Yes   Erika Hines, DEMETRIUS  Comments: HD access visible, venous clips present , lines secured. Patient resting quietly. Treatment in progress; will continue with current treatment plan at this  time.  E 07/02/2025 15:30 BP/Map 176/56 (96) 64 16 450 700 -190 140 30 570 1658    Yes   Erika Hines RN  Comments: HD access visible, venous clips present , lines secured. Patient resting quietly. Treatment in progress; will continue with current treatment plan at this time.  E 07/02/2025 16:00 BP/Map 149/84 (106) 69 16 450 700 -170 150 40 570 1875    Yes   Erika Hines RN  Comments: HD access visible, venous clips present , lines secured. Patient resting quietly. Treatment in progress; will continue with current treatment plan at this time.  E 07/02/2025 16:10 BP/Map 138/58 (85) 67 16       2000 Yes 250  Yes   Erika Hines RN  Comments: HD complete, blood returned.  Post Treatment Vitals  Time Is BP being recorded? BP Map Pulse RR Temp How was Weight Obtained? Post Weight Metric BVP UF Goal Ordered NSS Given Intra-Procedure Total Machine UF Removed (mL) Crit-Line Ending Profile Crit-Line Refill Crit-Line Ending HCT Crit-Line Max BV% Clinician  07/02/2025 16:26 BP/Map 141/58 (86) 57 16 98 How Obtained: Bed scale 38.1 Kgs 88.1 6673 011 2180     Erika Hines RN  Comments: Tolerated tx well today.  Safety checks include: access uncovered and secured, Hemaclip secured for all central line access, machine checks performed, and alarm limits confirmed.  Labs  Hepatitis  HBsAG Lab Result HBsAG Lab Result HBsAG Draw Date Transient Antigenemia(MD Diagnosis Only) Anti-HBs Lab Result Anti-HBs Lab Value Anti-HBs Draw Date Documented By Documented Date Hepatitis Status Hepatitis Status  Negative  06/09/2025  Negative  04/30/2025 Erika Hines 07/02/2025  Susceptible  Notes:   Pre-Treatment Hepatitis Precautions Copy of hepatitis results verified in hospital EMR Yes Signing  Patient tx outside buffer zone Yes Hepatitis Information Entered By Erika Hines RN Signed By Erika Hines RN  Pre-Treatment Handoff  Staff Report Received Yes  Report Given by Primary Nurse Josephine Sorenson LPN  Time 12:00  Patient Arrival  Patient  ID Verified Date of Birth  Full Name  Patient Consent to treatment verified Yes  Blood Transfusion Consent Verified N/A  Treatment Comments  Treatment Notes Hemodynamically stable post HD today. HD access loc with 09% Normal Saline; 1.6 mL per lumen.  Post-Treatment Handoff  Report Given to Primary Nurse Josephine Sorenson LPN  Time Report Given 16:30  Report Given By Erika Hines RN  Machine Validation  Time 12:00  Date 7/2/2025  Auto Alarm Test Passed Yes  Machine Serial # [id number]  Portable RO Yes  RO Serial# 19  Residual Bleach Negative Yes  Was a manufactured mix used? Yes  Machine Log Completed Yes  Total Chlorine (less than 0.1)? Yes  Total Chlorine Log Completed Yes  Bicarb BiBag  Bibag Size 900  Machine Temp 36  Machine Conductivity 13.6  Meter Type N/A  Meter Conductivity   Independent Conductivity 13.8  pH Status Pass Pass  pH   TCD Value 13.7  TCD Alarm with +/- 0.5 Yes  NVL enabled validated 100 asymmetric Yes  Safety check complete Erika Hines RN  Second Verification Performed? Yes  Second Verification Performed By Ginny Cancino RN  Reason Not Verified   Serum Lab Values  Time BUN Creatinine Na K (mEq/L) Cl CO2 Ca (mEq/L) Phos Mg (mg/dL) Alb (g/dL) Glucose Hgb Hct WBC Plt PT aPTT INR Other Clinician  07/02/2025 07:47 47.1 2.99 133 4.6 100 21.0 8.9 - - 2.8 96 8.9 27.7 14.42 156 - - - - Erika Hines RN  Comments: LABS  Facility Information Room # 361 Diagnosis  Facility Information Bed # 1 Diagnosis Vascular occlusion  Admission Date 06/19/2025 Stat Treatment No Isolation Information  Ordering MD [place] Patient Type New patient to dialysis with diagnosis of ESRD Isolation Required? N  Account/Finance Number [id number] Patient Chronic Unit [facility] Completed by  Admission Status InPatient Patient Home Unit [facility] information Entered by Erika Hines RN  Location Dialysis Suite 1:1 Code Status Full Code   Start Treatment Time Out Confirmed by DEMETRIUS James/ DEMETRIUS Gross Correct access site  verified Yes  Treatment Initiation Connections Secured Time Out Completed 12:36 Treatment Start Date 07/02/2025  Saline line double clamped Correct patient verified Yes Treatment Start Time 12:41  Hemaclip Applied Correct procedure verified Yes Patient/Family questions and concerns addressed Yes  Pre Focused Assessment Edema LOC Alert and Oriented x3  Access Location Generalized GI / Bowels  Signs and Symptoms of Infection? No Edema Grade 1+ GI Bowel sounds present  Pain Screening Notes hx of (R) AKA   Does the patient have pain? No Cardiac  Voids  Respiratory Heart Rhythm Regular Notes Patient will use urinal  Lung Sounds Clear Telemetry No Completed by  Location Bilateral Skin Pre Treatment Focused Assessment Completed By Erika Hines RN  Position Anterior Skin Dry Time 12:38  Respiratory Efforts Unlabored  Warm Signing  Notes room air LOC Signed By Erika Hines RN  Education Method Knowledge / Understanding Assessed Teach back Method Knowledge / Understanding Assessed Teach back  Patient Education Family Education Provided? N/A Patient Education Introduced By  Patient Educated? Yes Caregiver Education Provided? Yes Patient Education Introduced By Erika Hines RN  Focus or Topic Hemodialysis treatment review Focus or Topic Hemodialysis treatment review   Post-Treatment HD machine external disinfection completed per policy Yes Completed by  Post Treatment Delay PRO external disinfection completed per policy Yes Post Treatment Form Completed By Erika Hines RN  Delay N Post Treatment General Information   Machine Disinfection Requirement Notes Tolerated tx well today. returned to floor stable.   Post Focused Assessment Location Bilateral LOC Alert and Oriented x3  Changes from Pre Focused Assessment Position Anterior GI / Bowels  Changes from Pre Treatment Focused Assessment? No Respiratory Efforts Unlabored GI Bowel sounds present  Access Notes room air   Cath Packed with Normal Saline  0.9% Edema  Voids  Access Port(ml) 1.6 Location Generalized Notes Patient will use urinal  Return Port(ml) 1.6 Edema Grade 1+ Completed by  Catheter clamped and capped Yes Cardiac Post Treatment Focused Assessment Completed by Erika Hines RN  Access Flow Good Heart Rhythm Regular Date 07/02/2025  Dialyzer Clearance Streaked Telemetry No Time 16:27  Pain Screening Skin Signing  Does the patient have pain? No Skin Warm Signed By Erika Hines RN  Respiratory  Dry   Lung Sounds Clear LOC

## 2025-07-02 NOTE — PLAN OF CARE
Goal Outcome Evaluation:  Plan of Care Reviewed With: patient      Progress: improving     Patient A&O x4. C/o pain this shift; see MAR. Voiding per urinal. Right stump sock c/d/I. Permacath c/d/I. Plans for dialysis later today. Rm air. VSS. Safety maintained.

## 2025-07-02 NOTE — PROGRESS NOTES
Nephrology ([facility]) Progress Note      Patient:  Ricardo Hugo  YOB: 1950  Date of Service: 7/2/2025  MRN: [id number]   Acct: [id number]   Primary Care Physician: Nathan Zimmer MD  Advance Directive:   Code Status and Medical Interventions: CPR (Attempt to Resuscitate); Full Support   Ordered at: 06/19/25 1944     Code Status (Patient has no pulse and is not breathing):    CPR (Attempt to Resuscitate)     Medical Interventions (Patient has pulse or is breathing):    Full Support     Admit Date: 6/19/2025       Hospital Day: 13  Referring Provider: No Known Provider      Patient personally seen and examined.  Complete chart including Consults, Notes, Operative Reports, Labs, Cardiology, and Radiology studies reviewed as able.    Chief complaint: Abnormal labs.    Subjective:  Ricardo Hugo is a 75 y.o. male  whom we were consulted for end-stage renal disease on maintenance dialysis.  Patient has permacatheter as a dialysis access.  He also has history of peripheral vascular disease, right carotid endarterectomy, PTA of the right external iliac artery, right SFA.  Presenting to the emergency room complaining of right leg pain.  His pain has progressively getting worse and has decided come to the emergency room.  Patient is currently being evaluated by Dr. Zabala for another arteriogram, look at the status of his blood flow to the right leg.  On June 28 patient underwent intravascular lithotripsy of popliteal artery, posterior tibial artery followed by balloon angioplasties.  Patient had no improvement of his right leg circulation despite interventions.  Finally he agreed to proceed with right AKA.  He underwent AKA June 24.    Currently seen on hemodialysis  Hemodialysis access: Permacatheter  Hemodialysis: 3-1/2-hour  Ultrafiltration: 1000 cc  2K bath  Flow flow rate is 450 cc/min.      Allergies:  Ondansetron, Zofran [ondansetron hcl], Lortab  [hydrocodone-acetaminophen], and Allopurinol    Home Meds:  Medications Prior to Admission   Medication Sig Dispense Refill Last Dose/Taking    ALPRAZolam (XANAX) 0.25 MG tablet Take 1 tablet by mouth Every Night.   Taking    aspirin (aspirin) 81 MG EC tablet Take 1 tablet by mouth Daily.   Taking    atorvastatin (LIPITOR) 10 MG tablet Take 1 tablet by mouth Daily. 90 tablet 3 Taking    Budeson-Glycopyrrol-Formoterol (Breztri Aerosphere) 160-9-4.8 MCG/ACT aerosol inhaler Inhale 2 puffs 2 (Two) Times a Day.   Taking    calcitriol (ROCALTROL) 0.5 MCG capsule Take 1 capsule by mouth Daily. 90 capsule 2 Taking    carvedilol (COREG) 12.5 MG tablet Take 1 tablet by mouth 2 (Two) Times a Day With Meals.   Taking    clopidogrel (PLAVIX) 75 MG tablet Take 1 tablet by mouth Daily.   Taking    docusate sodium (COLACE) 100 MG capsule Take 1 capsule by mouth 3 (Three) Times a Day As Needed for Constipation.   Taking As Needed    empagliflozin (Jardiance) 10 MG tablet tablet Take 1 tablet by mouth Daily.   Taking    esomeprazole (nexIUM) 20 MG capsule Take 1 capsule by mouth Every Morning Before Breakfast.   Taking    gabapentin (NEURONTIN) 300 MG capsule Take 1 capsule by mouth 2 (Two) Times a Day.   Taking    iron polysaccharides (NIFEREX) 150 MG capsule Take 1 capsule by mouth Daily.   Taking    isosorbide dinitrate (ISORDIL) 10 MG tablet Take 1 tablet by mouth 3 (Three) Times a Day. 270 tablet 2 Taking    levothyroxine (Synthroid) 100 MCG tablet Take 1 tablet by mouth Every Morning. 90 tablet 2 Taking    magnesium chloride ER 64 MG DR tablet Take 1 tablet by mouth Daily.   Taking    montelukast (SINGULAIR) 10 MG tablet Take 1 tablet by mouth Every Night.   Taking    Multiple Vitamins-Minerals (PRESERVISION/LUTEIN) capsule Take 1 capsule by mouth 2 (two) times a day.   Taking    NIFEdipine XL (ADALAT CC) 60 MG 24 hr tablet Take 2 tablets by mouth Daily.   Taking    spironolactone (ALDACTONE) 25 MG tablet Take 1 tablet by mouth  Daily.   Taking    tamsulosin (FLOMAX) 0.4 MG capsule 24 hr capsule Take 1 capsule by mouth Every Night.   Taking    valsartan (DIOVAN) 320 MG tablet Take 1 tablet by mouth Daily.   Taking    vitamin D (ERGOCALCIFEROL) 1.25 MG (50151 UT) capsule capsule Take 1 capsule by mouth 1 (One) Time Per Week. Mondays   Taking    [DISCONTINUED] hydrALAZINE (APRESOLINE) 100 MG tablet Take 1 tablet by mouth 3 (Three) Times a Day.   Taking    albuterol sulfate  (90 Base) MCG/ACT inhaler Inhale 2 puffs Every 6 (Six) Hours As Needed for Wheezing or Shortness of Air.       bisacodyl (DULCOLAX) 10 MG suppository Insert 1 suppository into the rectum Daily As Needed for Constipation.       desoximetasone (TOPICORT) 0.25 % cream Apply 1 Application topically to the appropriate area as directed 2 (Two) Times a Day As Needed for Irritation. irritation 60 g 0     fluticasone (FLONASE) 50 MCG/ACT nasal spray Administer 2 sprays into the nostril(s) as directed by provider Daily As Needed for Allergies.       naloxone (NARCAN) 4 MG/0.1ML nasal spray Call 911. Don't prime. [place] in 1 nostril for overdose. Repeat in 2-3 minutes in other nostril if no or minimal breathing/responsiveness. 2 each 0     polyethylene glycol (MIRALAX) 17 g packet Take 17 g by mouth Daily As Needed (constipation).       senna 8.6 MG tablet Take 2 tablets by mouth Daily As Needed for Constipation.       [DISCONTINUED] cloNIDine (CATAPRES) 0.3 MG tablet Take 1 tablet by mouth 3 times a day. (Patient not taking: Reported on 6/21/2025)   Not Taking    [DISCONTINUED] gabapentin (NEURONTIN) 300 MG capsule Take 1 capsule by mouth Every Night. 24 capsule 0     [DISCONTINUED] omeprazole (priLOSEC) 20 MG capsule Take 1 capsule by mouth Daily. (Patient not taking: Reported on 6/21/2025)   Not Taking    [DISCONTINUED] oxyCODONE-acetaminophen (PERCOCET) 5-325 MG per tablet Take 1 tablet by mouth Every 6 (Six) Hours As Needed for Moderate Pain. 24 tablet 0         Medicines:  Current Facility-Administered Medications   Medication Dose Route Frequency Provider Last Rate Last Admin    acetaminophen (TYLENOL) tablet 650 mg  650 mg Oral Q4H PRN Blayne Zabala MD   650 mg at 06/30/25 0924    Or    acetaminophen (TYLENOL) 160 MG/5ML oral solution 650 mg  650 mg Oral Q4H PRN Blayne Zabala MD        Or    acetaminophen (TYLENOL) suppository 650 mg  650 mg Rectal Q4H PRN Blayne Zabala MD        aspirin EC tablet 81 mg  81 mg Oral Daily Blayne Zabala MD   81 mg at 07/02/25 0947    atorvastatin (LIPITOR) tablet 10 mg  10 mg Oral Nightly Blayne Zabala MD   10 mg at 07/01/25 2022    sennosides-docusate (PERICOLACE) 8.6-50 MG per tablet 2 tablet  2 tablet Oral BID Blayne Zabala MD   2 tablet at 07/02/25 0947    And    polyethylene glycol (MIRALAX) packet 17 g  17 g Oral Daily PRN Blayne Zabala MD        And    bisacodyl (DULCOLAX) EC tablet 5 mg  5 mg Oral Daily PRN Blayne Zabala MD   5 mg at 06/30/25 2022    And    bisacodyl (DULCOLAX) suppository 10 mg  10 mg Rectal Daily PRN Blayne Zabala MD        carvedilol (COREG) tablet 12.5 mg  12.5 mg Oral BID With Meals Jamie Pineda MD   12.5 mg at 07/01/25 2023    clopidogrel (PLAVIX) tablet 75 mg  75 mg Oral Daily Garrett Comer MD   75 mg at 07/02/25 0947    epoetin cecile-epbx (RETACRIT) injection 10,000 Units  10,000 Units Subcutaneous Once per day on Monday Wednesday Friday Blayne Zabala MD   10,000 Units at 07/02/25 0948    gabapentin (NEURONTIN) capsule 300 mg  300 mg Oral Nightly Blayne Zabala MD   300 mg at 07/01/25 2022    hydrALAZINE (APRESOLINE) injection 10 mg  10 mg Intravenous Q6H PRN Bouchra Trinidad MD   10 mg at 06/30/25 0001    [Held by provider] hydrALAZINE (APRESOLINE) tablet 100 mg  100 mg Oral TID Blayne Zabala MD   100 mg at 06/21/25 1941    iron polysaccharides (NIFEREX) capsule 150 mg  150 mg Oral Daily Garrett Comer MD   150 mg at 07/02/25 0947    isosorbide  dinitrate (ISORDIL) tablet 10 mg  10 mg Oral TID - Nitrates Blayne Zabala MD   10 mg at 07/01/25 1709    levothyroxine (SYNTHROID, LEVOTHROID) tablet 100 mcg  100 mcg Oral Q AM Blayne Zabala MD   100 mcg at 07/02/25 0502    montelukast (SINGULAIR) tablet 10 mg  10 mg Oral Nightly Blayne Zabala MD   10 mg at 07/01/25 2022    naloxone (NARCAN) injection 0.4 mg  0.4 mg Intravenous Q5 Min PRN Blayne Zabala MD        NIFEdipine XL (PROCARDIA XL) 24 hr tablet 120 mg  120 mg Oral Daily Blayne Zabala MD   120 mg at 07/01/25 1709    nitroglycerin (NITROSTAT) SL tablet 0.4 mg  0.4 mg Sublingual Q5 Min PRN Blayne Zabala MD        oxyCODONE-acetaminophen (PERCOCET)  MG per tablet 1 tablet  1 tablet Oral Q6H PRN Garrett Comer MD   1 tablet at 07/02/25 0539    oxyCODONE-acetaminophen (PERCOCET) 5-325 MG per tablet 1 tablet  1 tablet Oral Q4H PRN Blayne Zabala MD   1 tablet at 06/30/25 0606    polyethylene glycol (MIRALAX) packet 17 g  17 g Oral Daily Giuliano Saldana MD   17 g at 07/02/25 0947    promethazine (PHENERGAN) tablet 12.5 mg  12.5 mg Oral Q6H PRN Blayne Zabala MD   12.5 mg at 06/20/25 2243    sodium chloride 0.9 % flush 10 mL  10 mL Intravenous Q12H Blayne Zabala MD   10 mL at 07/02/25 0950    sodium chloride 0.9 % flush 10 mL  10 mL Intravenous PRN Blayne Zabala MD        sodium chloride 0.9 % infusion 40 mL  40 mL Intravenous PRN Blayne Zabala MD        [Held by provider] spironolactone (ALDACTONE) tablet 25 mg  25 mg Oral Daily Blayne Zabala MD   25 mg at 06/22/25 0905    tamsulosin (FLOMAX) 24 hr capsule 0.4 mg  0.4 mg Oral Nightly Blayne Zabala MD   0.4 mg at 07/01/25 2022    valsartan (DIOVAN) tablet 80 mg  80 mg Oral Daily Garrett Comer MD           Past Medical History:  Past Medical History:   Diagnosis Date    3-vessel CAD 08/11/2020    Allergic rhinitis     Anemia     Anxiety disorder 04/27/2020    Arthritis     Asymmetrical sensorineural hearing loss 06/28/2017     Atherosclerosis of native artery of both lower extremities with intermittent claudication 07/18/2019    Avascular necrosis of femoral head, left 07/11/2020    right hip after surgery    Carotid stenosis     Chronic mucoid otitis media     Chronic rhinitis     COPD (chronic obstructive pulmonary disease)     Coronary artery disease     HEART BYPASS 2004    Crohn's disease of large intestine with other complication 07/30/2020    Chronic diarrhea Colonoscopy July 2020 revealed mild patchy scattered hemosiderin staining with inflammation more so in rectosigmoid area.  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    Iron deficiency anemia 07/14/2020    Mixed hearing loss of left ear     PAD (peripheral artery disease) 08/11/2020    Perianal abscess     Pernicious anemia 08/17/2020    took shots but never diagnosed with b12 deficiency    Personal history of alcoholism 08/11/2020    quit drinking in 2013    Prostatic hypertrophy 08/11/2020    Sensorineural hearing loss     Sepsis with acute renal failure 09/15/2020    Shortness of breath 05/27/2021    Tinnitus     Ventricular tachycardia, nonsustained 07/14/2020    Weight loss 07/11/2020       Past Surgical History:  Past Surgical History:   Procedure Laterality Date    ABOVE KNEE AMPUTATION Right 6/24/2025    Procedure: right above knee amputation;  Surgeon: Blayne Zabala MD;  Location: [facility] OR;  Service: Vascular;  Laterality: Right;    AORTOGRAM Right 4/25/2025    Procedure: RIGHT LOWER EXTREMITY ANGIOGRAM, SHOCKWAVE LITHOTRIPSY, BALLOON ANGIOPLASTY, MYNX CLOSURE;  Surgeon: Gil Pineda DO;  Location:   [facility] HYBRID OR;  Service: Vascular;  Laterality: Right;    AORTOGRAM Left 5/22/2025    Procedure: LEFT LOWER EXTREMITY ANGIOGRAM, SHOCKWAVE LITHOTRIPSY, BALLOON ANGIOPLASTY, STENT PLACEMENT, MYNX CLOSURE;  Surgeon: Blayne Zabala MD;  Location: [facility] HYBRID OR;  Service: Vascular;  Laterality: Left;    AORTOGRAM Right 5/30/2025    Procedure: AORTOILIAC ANGIOGRAM WITH LEFT LOWER EXTREMITY ANGIOGRAM;  Surgeon: Gil Pineda DO;  Location: [facility] HYBRID OR;  Service: Vascular;  Laterality: Right;    AORTOGRAM Right 6/20/2025    Procedure: right lower extremity arteriogram, shockwave lithotripsy, balloon angioplasty, mynx closue;  Surgeon: Blayne Zabala MD;  Location: [facility] HYBRID OR;  Service: Vascular;  Laterality: Right;    ARTERY SURGERY  2021    right side on neck    CAROTID ENDARTERECTOMY Right 05/10/2021    Procedure: RIGHT CAROTID ENDARTERECTOMY WITH EEG;  Surgeon: Gil Pineda DO;  Location: [facility] HYBRID OR 12;  Service: Vascular;  Laterality: Right;    COLONOSCOPY N/A 07/02/2020    Procedure: COLONOSCOPY WITH ANESTHESIA;  Surgeon: Adrien Brewster MD;  Location: [facility] ENDOSCOPY;  Service: Gastroenterology;  Laterality: N/A;  pre op: diarrhea  post op: polyps  PCP: Joe Velasco MD    COLONOSCOPY N/A 10/13/2020    Procedure: COLONOSCOPY WITH ANESTHESIA;  Surgeon: Adrien Brewster MD;  Location: [facility] ENDOSCOPY;  Service: Gastroenterology;  Laterality: N/A;  Pre: Chronic Diarrhea, Crohn's  Post: AVM  Dr. Neftali Velasco  CO2 Inflation Used    COLONOSCOPY N/A 12/08/2023    Procedure: COLONOSCOPY WITH ANESTHESIA;  Surgeon: Adrien Brewster MD;  Location: [facility] ENDOSCOPY;  Service: Gastroenterology;  Laterality: N/A;  pre chrone's disease  post sub optimal prep, polyp, chrone's      CORONARY ARTERY BYPASS GRAFT  2003    x3    ENDOSCOPY N/A 11/02/2021    Procedure: ESOPHAGOGASTRODUODENOSCOPY WITH ANESTHESIA;  Surgeon: Bridger Bell MD;  Location: [facility] ENDOSCOPY;   Service: Gastroenterology;  Laterality: N/A;  pre anemia;gi bleed  post  gi bleed;schatski ring  Dr. ERIC Velasco    ENDOSCOPY N/A 10/10/2023    Procedure: ESOPHAGOGASTRODUODENOSCOPY WITH ANESTHESIA;  Surgeon: dArien Brewster MD;  Location: [facility] ENDOSCOPY;  Service: Gastroenterology;  Laterality: N/A;  preop; anemia  postop esophagitis ; R/O barretts   PCP Randall Beata    EYE SURGERY Bilateral     catorac    INCISION AND DRAINAGE PERIRECTAL ABSCESS N/A 2017    Procedure: INCISION AND DRAINAGE OF JEET ANAL ABSCESS;  Surgeon: Lynette Smith MD;  Location: [facility] OR;  Service:     INGUINAL HERNIA REPAIR Bilateral 2023    Procedure: INGUINAL HERNIA BILATERAL REPAIR LAPAROSCOPIC WITH DAVINCI ROBOT WITH MESH;  Surgeon: Tahira Rivera MD;  Location: [facility] OR;  Service: Robotics - DaVinci;  Laterality: Bilateral;    INSERTION HEMODIALYSIS CATHETER N/A 2025    Procedure: HEMODIALYSIS CATHETER PLACEMENT;  Surgeon: Gil Pineda DO;  Location: [facility] HYBRID OR;  Service: Vascular;  Laterality: N/A;    MYRINGOTOMY W/ TUBES Left 2017    06/10/2016    TONSILLECTOMY      TOTAL HIP ARTHROPLASTY Right        Family History  Family History   Problem Relation Age of Onset    Breast cancer Mother     Dementia Father     Glaucoma Father     No Known Problems Daughter     Colon polyps Neg Hx     Colon cancer Neg Hx        Social History  Social History     Socioeconomic History    Marital status:    Tobacco Use    Smoking status: Former     Current packs/day: 0.00     Average packs/day: 0.5 packs/day for 25.8 years (12.9 ttl pk-yrs)     Types: Cigarettes     Start date:      Quit date: 10/13/2013     Years since quittin.7     Passive exposure: Past    Smokeless tobacco: Never    Tobacco comments:     quit 2013   Vaping Use    Vaping status: Former    Substances: Nicotine    Devices: Pre-filled or refillable cartridge   Substance and Sexual Activity    Alcohol use: Not  Currently    Drug use: No    Sexual activity: Not Currently     Partners: Female       Review of Systems:  History obtained from chart review and the patient  General ROS: No fever or chills  Respiratory ROS: No cough, shortness of breath, wheezing  Cardiovascular ROS: No chest pain or palpitations  Gastrointestinal ROS: No abdominal pain or melena  Genito-Urinary ROS: No dysuria or hematuria  Psych ROS: No anxiety and depression  14 point ROS reviewed with the patient and negative except as noted above and in the HPI unless unable to obtain.    Objective:  Patient Vitals for the past 24 hrs:   BP Temp Temp src Pulse Resp SpO2 Weight   07/02/25 1210 (!) 137/39 -- -- -- -- -- --   07/02/25 1134 (!) 122/37 98.2 °F (36.8 °C) Axillary 66 18 95 % --   07/02/25 0822 149/64 97.4 °F (36.3 °C) Axillary 81 18 96 % --   07/02/25 0457 149/49 98.8 °F (37.1 °C) Oral 72 18 95 % 39.6 kg (87 lb 6.4 oz)   07/01/25 2022 173/48 98.4 °F (36.9 °C) Oral 72 16 97 % --   07/01/25 1553 134/48 97.7 °F (36.5 °C) Oral 63 18 100 % --       Intake/Output Summary (Last 24 hours) at 7/2/2025 1303  Last data filed at 7/2/2025 1134  Gross per 24 hour   Intake 240 ml   Output 900 ml   Net -660 ml     General: awake/alert   HEENT: Normocephalic atraumatic head  Neck: Supple with no JVD or carotid bruits.  Chest:  clear to auscultation bilaterally without respiratory distress  CVS: regular rate and rhythm  Abdominal: soft, nontender, positive bowel sounds  Extremities: Right AKA.  Skin: warm and dry without rash      Labs:  Results from last 7 days   Lab Units 07/02/25  0747 07/01/25  0451 06/30/25  0402   WBC 10*3/mm3 14.42* 15.52* 12.42*   HEMOGLOBIN g/dL 8.9* 8.7* 8.5*   HEMATOCRIT % 27.7* 27.0* 26.6*   PLATELETS 10*3/mm3 156 148 135*         Results from last 7 days   Lab Units 07/02/25  0747 07/01/25  0451 06/30/25  0402   SODIUM mmol/L 133* 136 133*   POTASSIUM mmol/L 4.6 4.1 4.9   CHLORIDE mmol/L 100 101 100   CO2 mmol/L 21.0* 23.0 21.0*   BUN  "mg/dL 47.1* 35.9* 52.4*   CREATININE mg/dL 2.99* 2.40* 3.04*   CALCIUM mg/dL 8.9 8.6 8.7   EGFR mL/min/1.73 20.8* 27.1* 20.4*   BILIRUBIN mg/dL 0.4 0.4 0.4   ALK PHOS U/L 252* 229* 206*   ALT (SGPT) U/L 24 20 19   AST (SGOT) U/L 34 36 38   GLUCOSE mg/dL 96 100* 99       Radiology:   Imaging Results (Last 72 Hours)       ** No results found for the last 72 hours. **            Culture:  No results found for: \"BLOODCX\", \"URINECX\", \"WOUNDCX\", \"MRSACX\", \"RESPCX\", \"STOOLCX\"      Assessment   1.  End-stage renal disease on maintenance dialysis.  2.  Hypertensive nephrosclerosis.  3.  Severe peripheral vascular disease, status post right AKA  4.  Anemia of chronic kidney disease.  5.  History of carotid occlusive disease    Plan:  1.  Tolerating dialysis well.  Ultrafiltration up to 1500 cc.  2.  Continue ABILIO.  3.  Disposition to nursing home is pending for long-term rehabilitation.      Giuliano Saldana MD  7/2/2025  13:03 CDT    "

## 2025-07-02 NOTE — PLAN OF CARE
Goal Outcome Evaluation:  Plan of Care Reviewed With: patient        Progress: improving  Outcome Evaluation: PT. IS IN DIALYSIS NOW BUT HAS BEEN DISCHARGED FOLLOWING DIALYSIS TODAY.

## 2025-07-02 NOTE — CASE MANAGEMENT/SOCIAL WORK
Continued Stay Note   [facility] [facility]     Patient Name: Ricardo Hugo  MRN: [id number]  Today's Date: 7/2/2025    Admit Date: 6/19/2025    Plan: [facility]   Discharge Plan       Row Name 07/02/25 1122       Plan    Plan [facility]    Patient/Family in Agreement with Plan yes    Final Discharge Disposition Code 03 - skilled nursing facility (SNF)    Final Note Pt is being d/c'ed to [facility] [phone]. They will pull the d/c summary from [facility]. Pt will go by [facility] [phone].                   Discharge Codes    No documentation.                 Expected Discharge Date and Time       Expected Discharge Date Expected Discharge Time    Jul 2, 2025               LINNETTE Ramos

## 2025-07-03 VITALS
BODY MASS INDEX: 10.97 KG/M2 | HEIGHT: 72 IN | TEMPERATURE: 98.3 F | SYSTOLIC BLOOD PRESSURE: 143 MMHG | WEIGHT: 81 LBS | RESPIRATION RATE: 16 BRPM | HEART RATE: 68 BPM | OXYGEN SATURATION: 94 % | DIASTOLIC BLOOD PRESSURE: 51 MMHG

## 2025-07-03 LAB
ALBUMIN SERPL-MCNC: 2.8 G/DL (ref 3.5–5.2)
ALBUMIN/GLOB SERPL: 1 G/DL
ALP SERPL-CCNC: 253 U/L (ref 39–117)
ALT SERPL W P-5'-P-CCNC: 32 U/L (ref 1–41)
ANION GAP SERPL CALCULATED.3IONS-SCNC: 9 MMOL/L (ref 5–15)
AST SERPL-CCNC: 40 U/L (ref 1–40)
BASOPHILS # BLD AUTO: 0.07 10*3/MM3 (ref 0–0.2)
BASOPHILS NFR BLD AUTO: 0.8 % (ref 0–1.5)
BILIRUB SERPL-MCNC: 0.4 MG/DL (ref 0–1.2)
BUN SERPL-MCNC: 23 MG/DL (ref 8–23)
BUN/CREAT SERPL: 12.6 (ref 7–25)
CALCIUM SPEC-SCNC: 8.6 MG/DL (ref 8.6–10.5)
CHLORIDE SERPL-SCNC: 101 MMOL/L (ref 98–107)
CO2 SERPL-SCNC: 24 MMOL/L (ref 22–29)
CREAT SERPL-MCNC: 1.82 MG/DL (ref 0.76–1.27)
DEPRECATED RDW RBC AUTO: 57.2 FL (ref 37–54)
EGFRCR SERPLBLD CKD-EPI 2021: 37.8 ML/MIN/1.73
EOSINOPHIL # BLD AUTO: 0.4 10*3/MM3 (ref 0–0.4)
EOSINOPHIL NFR BLD AUTO: 4.7 % (ref 0.3–6.2)
ERYTHROCYTE [DISTWIDTH] IN BLOOD BY AUTOMATED COUNT: 16.4 % (ref 12.3–15.4)
GLOBULIN UR ELPH-MCNC: 2.7 GM/DL
GLUCOSE SERPL-MCNC: 93 MG/DL (ref 65–99)
HCT VFR BLD AUTO: 25.4 % (ref 37.5–51)
HGB BLD-MCNC: 8.2 G/DL (ref 13–17.7)
IMM GRANULOCYTES # BLD AUTO: 0.11 10*3/MM3 (ref 0–0.05)
IMM GRANULOCYTES NFR BLD AUTO: 1.3 % (ref 0–0.5)
LYMPHOCYTES # BLD AUTO: 1.11 10*3/MM3 (ref 0.7–3.1)
LYMPHOCYTES NFR BLD AUTO: 12.9 % (ref 19.6–45.3)
MCH RBC QN AUTO: 31.4 PG (ref 26.6–33)
MCHC RBC AUTO-ENTMCNC: 32.3 G/DL (ref 31.5–35.7)
MCV RBC AUTO: 97.3 FL (ref 79–97)
MONOCYTES # BLD AUTO: 0.68 10*3/MM3 (ref 0.1–0.9)
MONOCYTES NFR BLD AUTO: 7.9 % (ref 5–12)
NEUTROPHILS NFR BLD AUTO: 6.21 10*3/MM3 (ref 1.7–7)
NEUTROPHILS NFR BLD AUTO: 72.4 % (ref 42.7–76)
NRBC BLD AUTO-RTO: 0 /100 WBC (ref 0–0.2)
PLATELET # BLD AUTO: 160 10*3/MM3 (ref 140–450)
PMV BLD AUTO: 9.3 FL (ref 6–12)
POTASSIUM SERPL-SCNC: 3.6 MMOL/L (ref 3.5–5.2)
PROT SERPL-MCNC: 5.5 G/DL (ref 6–8.5)
RBC # BLD AUTO: 2.61 10*6/MM3 (ref 4.14–5.8)
SODIUM SERPL-SCNC: 134 MMOL/L (ref 136–145)
WBC NRBC COR # BLD AUTO: 8.58 10*3/MM3 (ref 3.4–10.8)

## 2025-07-03 PROCEDURE — 80053 COMPREHEN METABOLIC PANEL: CPT | Performed by: INTERNAL MEDICINE

## 2025-07-03 PROCEDURE — 85025 COMPLETE CBC W/AUTO DIFF WBC: CPT | Performed by: FAMILY MEDICINE

## 2025-07-03 RX ORDER — GABAPENTIN 300 MG/1
300 CAPSULE ORAL NIGHTLY
Start: 2025-07-03 | End: 2025-07-27

## 2025-07-03 RX ORDER — OXYCODONE AND ACETAMINOPHEN 5; 325 MG/1; MG/1
1 TABLET ORAL EVERY 6 HOURS PRN
Qty: 24 TABLET | Refills: 0 | Status: ON HOLD
Start: 2025-07-03 | End: 2025-07-11

## 2025-07-03 RX ADMIN — Medication 150 MG: at 09:31

## 2025-07-03 RX ADMIN — POLYETHYLENE GLYCOL 3350 17 G: 17 POWDER, FOR SOLUTION ORAL at 09:30

## 2025-07-03 RX ADMIN — ASPIRIN 81 MG: 81 TABLET, COATED ORAL at 09:31

## 2025-07-03 RX ADMIN — VALSARTAN 80 MG: 80 TABLET, FILM COATED ORAL at 09:31

## 2025-07-03 RX ADMIN — DOCUSATE SODIUM 50 MG AND SENNOSIDES 8.6 MG 2 TABLET: 8.6; 5 TABLET, FILM COATED ORAL at 09:31

## 2025-07-03 RX ADMIN — CARVEDILOL 12.5 MG: 6.25 TABLET, FILM COATED ORAL at 09:30

## 2025-07-03 RX ADMIN — CLOPIDOGREL BISULFATE 75 MG: 75 TABLET, FILM COATED ORAL at 09:31

## 2025-07-03 RX ADMIN — ISOSORBIDE DINITRATE 10 MG: 10 TABLET ORAL at 12:48

## 2025-07-03 RX ADMIN — ISOSORBIDE DINITRATE 10 MG: 10 TABLET ORAL at 09:31

## 2025-07-03 RX ADMIN — LEVOTHYROXINE SODIUM 100 MCG: 0.1 TABLET ORAL at 05:14

## 2025-07-03 NOTE — SIGNIFICANT NOTE
I was called on this patient with wanting to discuss if he stays for the night.  I spoke with him, and understood he was a little anxious, and he wanted to think more about it.  I explained that his primary team already arranged for the discharge during the day, but I will not be rushing him to leave the hospital if he thinks he is not ready.  He verbalized that he is ready to be discharged and he does not want to stay in the hospital longer.  We we will proceed with the discharge planning.

## 2025-07-03 NOTE — NURSING NOTE
1945---After handing off with day shift nurse in pts room, pt verbalizes that he is still anxious to dc and would like to stay the night. I explained that the dc order was in from earlier in the day per Dr. Comer but I would check with the night Hospitalist and have him come speak to Mr. Hugo.    2100---Dr. Trinidad spoke with pt and he is agreeable to dc now. Called [facility] to give report with no answer.    2130 and 2200---Call placed to [facility] again x2 with no response. Communicated to pt that it seems he will be spending the night here due to not being able to get in communication with [facility].

## 2025-07-03 NOTE — PROGRESS NOTES
[facility]  INPATIENT PROGRESS NOTE    Patient Name: Ricardo Hguo  Date of Admission: 6/19/2025  Today's Date: 07/03/25  Length of Stay: 14  Primary Care Physician: Nathan Zimmer MD    Subjective   Chief Complaint: Peripheral vascular disease.   HPI   Blood pressure stable, afebrile.  Sodium slight decreased.  Creatinine slight increase.  Patient is on room air.  Leukocytosis shows slight improvement.  Hemoglobin stable.    Review of Systems   Constitutional:  Positive for activity change, appetite change and fatigue. Negative for chills and fever.   HENT:  Negative for hearing loss, nosebleeds, tinnitus and trouble swallowing.    Eyes:  Negative for visual disturbance.   Respiratory:  Negative for cough, chest tightness, shortness of breath and wheezing.    Cardiovascular:  Negative for chest pain, palpitations and leg swelling.   Gastrointestinal:  Negative for abdominal distention, abdominal pain, blood in stool, constipation, diarrhea, nausea and vomiting.   Endocrine: Negative for cold intolerance, heat intolerance, polydipsia, polyphagia and polyuria.   Genitourinary:  Negative for decreased urine volume, difficulty urinating, dysuria, flank pain, frequency and hematuria.   Musculoskeletal:  Positive for arthralgias, gait problem and myalgias. Negative for joint swelling.   Skin:  Negative for rash.   Allergic/Immunologic: Negative for immunocompromised state.   Neurological:  Positive for weakness. Negative for dizziness, syncope, light-headedness and headaches.   Hematological:  Negative for adenopathy. Does not bruise/bleed easily.   Psychiatric/Behavioral:  Negative for confusion and sleep disturbance. The patient is not nervous/anxious.  All pertinent negatives and positives are as above. All other systems have been reviewed and are negative unless otherwise stated.     Objective    Temp:  [97.4 °F (36.3 °C)-98.9 °F (37.2 °C)] 98.4 °F (36.9  °C)  Heart Rate:  [66-81] 66  Resp:  [16-18] 16  BP: (122-159)/(37-64) 140/43  Physical Exam  Vitals and nursing note reviewed.   Constitutional:       Comments: Advanced age.  Chronically ill.  Cachectic.   HENT:      Head: Normocephalic.   Eyes:      Conjunctiva/sclera: Conjunctivae normal.      Pupils: Pupils are equal, round, and reactive to light.   Cardiovascular:      Rate and Rhythm: Normal rate and regular rhythm.      Heart sounds: Normal heart sounds.   Pulmonary:      Effort: Pulmonary effort is normal. No respiratory distress.      Breath sounds: Normal breath sounds.      Comments: On room air.  Abdominal:      General: Bowel sounds are normal. There is no distension.      Palpations: Abdomen is soft.      Tenderness: There is no abdominal tenderness.   Musculoskeletal:         General: No swelling.      Cervical back: Neck supple.      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   Skin:     General: Skin is warm and dry.      Findings: No rash.   Neurological:      Mental Status: He is alert and oriented to person, place, and time.      Motor: Weakness present.      Coordination: Coordination abnormal.      Gait: Gait abnormal.   Psychiatric:         Mood and Affect: Mood normal.         Behavior: Behavior normal.         Thought Content: Thought content normal.             Results Review:  I have reviewed the labs, radiology results, and diagnostic studies.    Laboratory Data:   Results from last 7 days   Lab Units 07/03/25  0359 07/02/25  0747 07/01/25  0451   WBC 10*3/mm3 8.58 14.42* 15.52*   HEMOGLOBIN g/dL 8.2* 8.9* 8.7*   HEMATOCRIT % 25.4* 27.7* 27.0*   PLATELETS 10*3/mm3 160 156 148        Results from last 7 days   Lab Units 07/03/25  0359 07/02/25  0747 07/01/25  0451   SODIUM mmol/L 134* 133* 136   POTASSIUM mmol/L 3.6 4.6 4.1   CHLORIDE mmol/L 101 100 101   CO2 mmol/L 24.0 21.0* 23.0   BUN mg/dL 23.0 47.1* 35.9*   CREATININE mg/dL 1.82* 2.99* 2.40*  "  CALCIUM mg/dL 8.6 8.9 8.6   BILIRUBIN mg/dL 0.4 0.4 0.4   ALK PHOS U/L 253* 252* 229*   ALT (SGPT) U/L 32 24 20   AST (SGOT) U/L 40 34 36   GLUCOSE mg/dL 93 96 100*       Culture Data:   No results found for: \"BLOODCX\", \"URINECX\", \"WOUNDCX\", \"MRSACX\", \"RESPCX\", \"STOOLCX\"    Radiology Data:   Imaging Results (Last 24 Hours)       ** No results found for the last 24 hours. **            I have reviewed the patient's current medications.     Assessment/Plan   Assessment  Active Hospital Problems    Diagnosis     **Vascular occlusion     COPD (chronic obstructive pulmonary disease)     Severe protein-calorie malnutrition     Chronic heart failure with preserved ejection fraction (HFpEF)     Thrombocytopenia     ESRD (end stage renal disease) on dialysis     Chronic diastolic (congestive) heart failure     Sensorineural hearing loss (SNHL), bilateral     Eustachian tube dysfunction, bilateral     Mixed hyperlipidemia     CLL (chronic lymphocytic leukemia)     CKD (chronic kidney disease) stage 5     Diastolic dysfunction     Resistant hypertension     Peripheral arterial disease     Essential hypertension        Treatment Plan  Vascular occlusion - With intervention on 6/22 right popliteal artery and peroneal artery.  Also had PTA to the right posterior tibial artery.  Was on heparin drip preprocedure. Post procedure on aspirin, Plavix, Lipitor.  Unfortunately despite attempts at revascularization patient continued to have PAD with poor blood flow to his lower extremity.  Vascular consult.  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  Plan- Evaluation on Monday for DC to SNF.  Plan on reassessing wound with bandage takedown on Monday 6/30. Patient likely okay to go back to facility at that point if stable.  Vascular recommendation-resume Plavix tomorrow.  Follow-up in 3 weeks.  Keep wound clean and covered.   and protector in place.     ESRD -continue HD as scheduled.  Nephrology following.  " Creatinine slightly increased     Chronic diastolic CHF/hypertension-appears euvolemic/hyperlipidemia.  No signs of exacerbation.  Aspirin . Lipitor.  Coreg . Isordil.  Nifedipine.  Hold Aldactone .  Restart valsartan low-dose.  Hydralazine on hold.  Clonidine was discontinued.  Restart Plavix 7/1/2025 slight.  Procardia .     Chronic constipation -continue bowel regimen.       Anemia of chronic disease -monitor closely.  Received 2 uprbc 6/27/2025.  Hemoglobin stable.  Niferex.  Procrit .      Thrombocytopenia.  Platelet counts normalized.     COPD/no exacerbation.  Singulair.  On room air.     Neuropathy.  Neurontin nightly.     Hypothyroidism.  Synthroid.  Elevated TSH.  Free T4 pending.     Hyponatremia.  Sodium stable     Nausea.  Phenergan as needed.     Constipation.  Giana-Colace.     Prostate hypertrophy.  Flomax.     Advanced age.  75 years old     Pain.  Percocet as needed.  Narcan as needed.     Nutrition . Regular diet.  Boost supplement.     Deconditioning.  PT consult.     Patient is from rehab.     Medical Decision Making  Number and Complexity of problems: Vascular occlusion/dialysis/CHF/chronic constipation/anemia  Differential Diagnosis: None     Conditions and Status        Condition is unchanged.     [facility] Data  External documents reviewed: Previous note .  Cardiac tracing (EKG, telemetry) interpretation: Sinus .  Radiology interpretation: None  Labs reviewed: Laboratory  Any tests that were considered but not ordered: Lab in a.m.     Decision rules/scores evaluated (example [id number], Wells, etc): None     Discussed with: Patient     Care Planning  Shared decision making: Patient  Code status and discussions: Full code     Disposition  Social Determinants of Health that impact treatment or disposition: From rehab  1 to 3 days .        Electronically signed by Garrett Comer MD, 07/03/25, 07:53 CDT.

## 2025-07-03 NOTE — NURSING NOTE
JULIAN Burton called Rosalina at [phone] to give report. Nurse stated that she was not able to take report as she needed to speak with the admissions office first, then would call back for report. I called Rosalina at [phone] to give report. Patient is being accepted to [facility]. Nurse taking report stated that she will call us back in half and hour to get report.

## 2025-07-03 NOTE — THERAPY DISCHARGE NOTE
Acute Care - Physical Therapy Discharge Summary  [facility]       Patient Name: Ricardo Hugo  : 1950  MRN: [id number]    Today's Date: 7/3/2025                 Admit Date: 2025      PT Recommendation and Plan    Visit Dx:    ICD-10-CM ICD-9-CM   1. Vascular occlusion  I99.8 459.9   2. Arterial occlusion  I70.90 444.9   3. Dialysis patient  Z99.2 V45.11   4. Impaired mobility [Z74.09]  Z74.09 799.89   5. Severe protein-calorie malnutrition  E43 262   6. Thrombocytopenia  D69.6 287.5   7. Hyperkalemia  E87.5 276.7   8. Acute pain of left lower extremity  M79.605 729.5   9. Anemia, unspecified type  D64.9 285.9   10. Preop testing  Z01.818 V72.84   11. ESRD (end stage renal disease) on dialysis  N18.6 585.6    Z99.2 V45.11   12. Abnormal TSH  R79.89 790.6   13. History of pneumonia, current treatment with cefdinir  Z87.01 V12.61   14. Bradycardia  R00.1 427.89   15. Chronic diastolic (congestive) heart failure  I50.32 428.32     428.0   16. Persistent proteinuria  R80.1 791.0   17. Dermatitis associated with moisture  L30.8 692.89   18. Sleep difficulties  G47.9 780.50   19. Bilateral inguinal hernia without obstruction or gangrene, recurrence not specified  K40.20 550.92   20. Unilateral recurrent inguinal hernia without obstruction or gangrene  K40.91 550.91   21. Hypertrophy of inferior nasal turbinate  J34.3 478.0   22. Nasal septal deviation  J34.2 470   23. Anticoagulated  Z79.01 V58.61   24. Sensorineural hearing loss (SNHL), bilateral  H90.3 389.18   25. Eustachian tube dysfunction, bilateral  H69.93 381.81   26. Systolic murmur  R01.1 785.2   27. Mixed hyperlipidemia  E78.2 272.2   28. Perforation of left tympanic membrane  H72.92 384.20   29. CLL (chronic lymphocytic leukemia)  C91.10 204.10   30. Low iron   E61.1 280.9   31. Copper deficiency  E61.0 275.1   32. Anemia due to chronic kidney disease, on chronic dialysis  N18.6 585.6    D63.1 285.21    Z99.2 V45.11   33. CKD (chronic kidney  disease) stage 5  N18.4 585.4   34. Diastolic dysfunction  I51.89 429.9   35. Carotid stenosis, right  I65.21 433.10   36. Stenosis of right carotid artery  I65.21 433.10   37. Bilateral carotid artery stenosis  I65.23 433.10     433.30   38. IHD (ischemic heart disease)  I25.9 414.9   39. Peripheral arterial disease  I73.9 443.9   40. Wellness examination  Z00.00 V70.0   41. Symptomatic anemia  D64.9 285.9   42. Chronic diarrhea  K52.9 787.91   43. Resistant hypertension  I1A.0 401.9   44. Essential hypertension  I10 401.9   45. Chronic rhinitis  J31.0 472.0                PT Rehab Goals       Row Name 07/03/25 1500             Bed Mobility Goal 1 (PT)    Activity/Assistive Device (Bed Mobility Goal 1, PT) rolling to left;rolling to right;sit to supine/supine to sit;sidelying to sit/sit to sidelying  -AB      [place] Level/Cues Needed (Bed Mobility Goal 1, PT) independent  -AB      Time Frame (Bed Mobility Goal 1, PT) long term goal (LTG)  -AB      Progress/Outcomes (Bed Mobility Goal 1, PT) goal not met  -AB         Transfer Goal 1 (PT)    Activity/Assistive Device (Transfer Goal 1, PT) sit-to-stand/stand-to-sit;bed-to-chair/chair-to-bed;walker, rolling  -AB      [place] Level/Cues Needed (Transfer Goal 1, PT) modified independence  -AB      Time Frame (Transfer Goal 1, PT) long term goal (LTG)  -AB      Progress/Outcome (Transfer Goal 1, PT) goal not met  -AB         Balance Goal 1 (PT)    Activity/Assistive Device (Balance Goal) sitting static balance;sitting dynamic balance;standing static balance;standing dynamic balance;walker, rolling  -AB      [place] Level/Cues Needed (Balance Goal 1, PT) modified independence  -AB      Time Frame (Balance Goal 1, PT) long-term goal (LTG)  -AB      Progress/Outcomes (Balance Goal 1, PT) goal not met  -AB                User Key  (r) = Recorded By, (t) = Taken By, (c) = Cosigned By      Initials Name Provider Type Discipline    AB Virginia Villarreal, PTA  Physical Therapist Assistant PT                        PT Discharge Summary  Anticipated Discharge Disposition (PT): skilled nursing facility  Reason for Discharge: Discharge from facility  Outcomes Achieved: Refer to plan of care for updates on goals achieved  Discharge Destination: SNF      Virginia Villarreal, PTA   7/3/2025

## 2025-07-03 NOTE — PLAN OF CARE
Goal Outcome Evaluation:  Plan of Care Reviewed With: patient        Progress: improving  Outcome Evaluation: Pt resting well all shift. Plans for dc today back to [facility]. C/o pain x1, see MAR. Voiding via urinal. Refusing turns. Permacath c/d/i. Rt AKA with stump  present c/d/i. Safety maintained.

## 2025-07-05 ENCOUNTER — APPOINTMENT (OUTPATIENT)
Dept: GENERAL RADIOLOGY | Facility: HOSPITAL | Age: 75
End: 2025-07-05
Payer: MEDICARE

## 2025-07-05 ENCOUNTER — HOSPITAL ENCOUNTER (INPATIENT)
Facility: HOSPITAL | Age: 75
LOS: 6 days | Discharge: NURSING, REHAB OR LONG-TERM CARE | End: 2025-07-11
Attending: INTERNAL MEDICINE | Admitting: FAMILY MEDICINE
Payer: MEDICARE

## 2025-07-05 DIAGNOSIS — I1A.0 RESISTANT HYPERTENSION: ICD-10-CM

## 2025-07-05 DIAGNOSIS — L30.8 DERMATITIS ASSOCIATED WITH MOISTURE: ICD-10-CM

## 2025-07-05 DIAGNOSIS — R79.89 ELEVATED TROPONIN: ICD-10-CM

## 2025-07-05 DIAGNOSIS — K40.20 BILATERAL INGUINAL HERNIA WITHOUT OBSTRUCTION OR GANGRENE, RECURRENCE NOT SPECIFIED: ICD-10-CM

## 2025-07-05 DIAGNOSIS — J31.0 CHRONIC RHINITIS: ICD-10-CM

## 2025-07-05 DIAGNOSIS — Z87.01 HISTORY OF PNEUMONIA: ICD-10-CM

## 2025-07-05 DIAGNOSIS — I73.9 PERIPHERAL ARTERIAL DISEASE: ICD-10-CM

## 2025-07-05 DIAGNOSIS — Z99.2 ESRD (END STAGE RENAL DISEASE) ON DIALYSIS: ICD-10-CM

## 2025-07-05 DIAGNOSIS — J34.2 NASAL SEPTAL DEVIATION: ICD-10-CM

## 2025-07-05 DIAGNOSIS — Z74.09 IMPAIRED MOBILITY: ICD-10-CM

## 2025-07-05 DIAGNOSIS — J34.3 HYPERTROPHY OF INFERIOR NASAL TURBINATE: ICD-10-CM

## 2025-07-05 DIAGNOSIS — N18.6 ESRD (END STAGE RENAL DISEASE) ON DIALYSIS: ICD-10-CM

## 2025-07-05 DIAGNOSIS — H72.92 PERFORATION OF LEFT TYMPANIC MEMBRANE: ICD-10-CM

## 2025-07-05 DIAGNOSIS — I70.90 ARTERIAL OCCLUSION: ICD-10-CM

## 2025-07-05 DIAGNOSIS — I65.21 STENOSIS OF RIGHT CAROTID ARTERY: ICD-10-CM

## 2025-07-05 DIAGNOSIS — R41.0 CONFUSION: ICD-10-CM

## 2025-07-05 DIAGNOSIS — Z00.00 WELLNESS EXAMINATION: ICD-10-CM

## 2025-07-05 DIAGNOSIS — Z79.01 ANTICOAGULATED: ICD-10-CM

## 2025-07-05 DIAGNOSIS — I65.23 BILATERAL CAROTID ARTERY STENOSIS: ICD-10-CM

## 2025-07-05 DIAGNOSIS — I10 ESSENTIAL HYPERTENSION: ICD-10-CM

## 2025-07-05 DIAGNOSIS — E43 SEVERE PROTEIN-CALORIE MALNUTRITION: ICD-10-CM

## 2025-07-05 DIAGNOSIS — I50.32 CHRONIC DIASTOLIC (CONGESTIVE) HEART FAILURE: ICD-10-CM

## 2025-07-05 DIAGNOSIS — K40.91 UNILATERAL RECURRENT INGUINAL HERNIA WITHOUT OBSTRUCTION OR GANGRENE: ICD-10-CM

## 2025-07-05 DIAGNOSIS — Z99.2 DIALYSIS PATIENT: ICD-10-CM

## 2025-07-05 DIAGNOSIS — G47.9 SLEEP DIFFICULTIES: ICD-10-CM

## 2025-07-05 DIAGNOSIS — D64.9 SYMPTOMATIC ANEMIA: ICD-10-CM

## 2025-07-05 DIAGNOSIS — D63.1 ANEMIA DUE TO CHRONIC KIDNEY DISEASE, ON CHRONIC DIALYSIS: ICD-10-CM

## 2025-07-05 DIAGNOSIS — Z01.818 PREOP TESTING: ICD-10-CM

## 2025-07-05 DIAGNOSIS — N30.00 ACUTE CYSTITIS WITHOUT HEMATURIA: Primary | ICD-10-CM

## 2025-07-05 DIAGNOSIS — N18.6 ANEMIA DUE TO CHRONIC KIDNEY DISEASE, ON CHRONIC DIALYSIS: ICD-10-CM

## 2025-07-05 DIAGNOSIS — R01.1 SYSTOLIC MURMUR: Chronic | ICD-10-CM

## 2025-07-05 DIAGNOSIS — N18.4 CKD (CHRONIC KIDNEY DISEASE) STAGE 4, GFR 15-29 ML/MIN: ICD-10-CM

## 2025-07-05 DIAGNOSIS — I51.89 DIASTOLIC DYSFUNCTION: ICD-10-CM

## 2025-07-05 DIAGNOSIS — E61.0 COPPER DEFICIENCY: ICD-10-CM

## 2025-07-05 DIAGNOSIS — R79.89 ABNORMAL TSH: ICD-10-CM

## 2025-07-05 DIAGNOSIS — I25.9 IHD (ISCHEMIC HEART DISEASE): ICD-10-CM

## 2025-07-05 DIAGNOSIS — M79.605 ACUTE PAIN OF LEFT LOWER EXTREMITY: ICD-10-CM

## 2025-07-05 DIAGNOSIS — E78.2 MIXED HYPERLIPIDEMIA: ICD-10-CM

## 2025-07-05 DIAGNOSIS — C91.10 CLL (CHRONIC LYMPHOCYTIC LEUKEMIA): ICD-10-CM

## 2025-07-05 DIAGNOSIS — R00.1 BRADYCARDIA: ICD-10-CM

## 2025-07-05 DIAGNOSIS — D69.6 THROMBOCYTOPENIA: ICD-10-CM

## 2025-07-05 DIAGNOSIS — H90.3 SENSORINEURAL HEARING LOSS (SNHL), BILATERAL: ICD-10-CM

## 2025-07-05 DIAGNOSIS — E61.1 LOW IRON: ICD-10-CM

## 2025-07-05 DIAGNOSIS — H69.93 EUSTACHIAN TUBE DYSFUNCTION, BILATERAL: ICD-10-CM

## 2025-07-05 DIAGNOSIS — I65.21 CAROTID STENOSIS, RIGHT: ICD-10-CM

## 2025-07-05 DIAGNOSIS — D64.9 ANEMIA, UNSPECIFIED TYPE: ICD-10-CM

## 2025-07-05 DIAGNOSIS — E87.5 HYPERKALEMIA: ICD-10-CM

## 2025-07-05 DIAGNOSIS — Z99.2 ANEMIA DUE TO CHRONIC KIDNEY DISEASE, ON CHRONIC DIALYSIS: ICD-10-CM

## 2025-07-05 DIAGNOSIS — R80.1 PERSISTENT PROTEINURIA: ICD-10-CM

## 2025-07-05 DIAGNOSIS — K52.9 CHRONIC DIARRHEA: ICD-10-CM

## 2025-07-05 PROBLEM — A41.9 SEPSIS: Status: ACTIVE | Noted: 2025-07-05

## 2025-07-05 LAB
ALBUMIN SERPL-MCNC: 2.8 G/DL (ref 3.5–5.2)
ALBUMIN/GLOB SERPL: 1.1 G/DL
ALP SERPL-CCNC: 322 U/L (ref 39–117)
ALT SERPL W P-5'-P-CCNC: 39 U/L (ref 1–41)
ANION GAP SERPL CALCULATED.3IONS-SCNC: 12 MMOL/L (ref 5–15)
AST SERPL-CCNC: 46 U/L (ref 1–40)
B PARAPERT DNA SPEC QL NAA+PROBE: NOT DETECTED
B PERT DNA SPEC QL NAA+PROBE: NOT DETECTED
BACTERIA UR QL AUTO: ABNORMAL /HPF
BASOPHILS # BLD AUTO: 0.07 10*3/MM3 (ref 0–0.2)
BASOPHILS NFR BLD AUTO: 0.3 % (ref 0–1.5)
BILIRUB SERPL-MCNC: 0.3 MG/DL (ref 0–1.2)
BILIRUB UR QL STRIP: NEGATIVE
BUN SERPL-MCNC: 29.6 MG/DL (ref 8–23)
BUN/CREAT SERPL: 14.7 (ref 7–25)
C PNEUM DNA NPH QL NAA+NON-PROBE: NOT DETECTED
CALCIUM SPEC-SCNC: 8.4 MG/DL (ref 8.6–10.5)
CHLORIDE SERPL-SCNC: 99 MMOL/L (ref 98–107)
CLARITY UR: ABNORMAL
CO2 SERPL-SCNC: 24 MMOL/L (ref 22–29)
COLOR UR: YELLOW
CREAT SERPL-MCNC: 2.02 MG/DL (ref 0.76–1.27)
D-LACTATE SERPL-SCNC: 2 MMOL/L (ref 0.5–2)
DEPRECATED RDW RBC AUTO: 56.2 FL (ref 37–54)
EGFRCR SERPLBLD CKD-EPI 2021: 33.3 ML/MIN/1.73
EOSINOPHIL # BLD AUTO: 0.18 10*3/MM3 (ref 0–0.4)
EOSINOPHIL NFR BLD AUTO: 0.9 % (ref 0.3–6.2)
ERYTHROCYTE [DISTWIDTH] IN BLOOD BY AUTOMATED COUNT: 16.2 % (ref 12.3–15.4)
FLUAV SUBTYP SPEC NAA+PROBE: NOT DETECTED
FLUBV RNA NPH QL NAA+NON-PROBE: NOT DETECTED
GEN 5 1HR TROPONIN T REFLEX: 176 NG/L
GLOBULIN UR ELPH-MCNC: 2.6 GM/DL
GLUCOSE SERPL-MCNC: 103 MG/DL (ref 65–99)
GLUCOSE UR STRIP-MCNC: ABNORMAL MG/DL
HADV DNA SPEC NAA+PROBE: NOT DETECTED
HCOV 229E RNA SPEC QL NAA+PROBE: NOT DETECTED
HCOV HKU1 RNA SPEC QL NAA+PROBE: NOT DETECTED
HCOV NL63 RNA SPEC QL NAA+PROBE: NOT DETECTED
HCOV OC43 RNA SPEC QL NAA+PROBE: NOT DETECTED
HCT VFR BLD AUTO: 23.4 % (ref 37.5–51)
HGB BLD-MCNC: 7.7 G/DL (ref 13–17.7)
HGB UR QL STRIP.AUTO: ABNORMAL
HMPV RNA NPH QL NAA+NON-PROBE: NOT DETECTED
HPIV1 RNA ISLT QL NAA+PROBE: NOT DETECTED
HPIV2 RNA SPEC QL NAA+PROBE: NOT DETECTED
HPIV3 RNA NPH QL NAA+PROBE: NOT DETECTED
HPIV4 P GENE NPH QL NAA+PROBE: NOT DETECTED
HYALINE CASTS UR QL AUTO: ABNORMAL /LPF
IMM GRANULOCYTES # BLD AUTO: 0.36 10*3/MM3 (ref 0–0.05)
IMM GRANULOCYTES NFR BLD AUTO: 1.7 % (ref 0–0.5)
KETONES UR QL STRIP: NEGATIVE
LEUKOCYTE ESTERASE UR QL STRIP.AUTO: ABNORMAL
LYMPHOCYTES # BLD AUTO: 0.77 10*3/MM3 (ref 0.7–3.1)
LYMPHOCYTES NFR BLD AUTO: 3.7 % (ref 19.6–45.3)
M PNEUMO IGG SER IA-ACNC: NOT DETECTED
MCH RBC QN AUTO: 32.1 PG (ref 26.6–33)
MCHC RBC AUTO-ENTMCNC: 32.9 G/DL (ref 31.5–35.7)
MCV RBC AUTO: 97.5 FL (ref 79–97)
MONOCYTES # BLD AUTO: 1.6 10*3/MM3 (ref 0.1–0.9)
MONOCYTES NFR BLD AUTO: 7.6 % (ref 5–12)
NEUTROPHILS NFR BLD AUTO: 17.95 10*3/MM3 (ref 1.7–7)
NEUTROPHILS NFR BLD AUTO: 85.8 % (ref 42.7–76)
NITRITE UR QL STRIP: POSITIVE
NRBC BLD AUTO-RTO: 0 /100 WBC (ref 0–0.2)
PH UR STRIP.AUTO: 7.5 [PH] (ref 5–8)
PLATELET # BLD AUTO: 170 10*3/MM3 (ref 140–450)
PMV BLD AUTO: 9.7 FL (ref 6–12)
POTASSIUM SERPL-SCNC: 3.7 MMOL/L (ref 3.5–5.2)
PROT SERPL-MCNC: 5.4 G/DL (ref 6–8.5)
PROT UR QL STRIP: ABNORMAL
RBC # BLD AUTO: 2.4 10*6/MM3 (ref 4.14–5.8)
RBC # UR STRIP: ABNORMAL /HPF
REF LAB TEST METHOD: ABNORMAL
RHINOVIRUS RNA SPEC NAA+PROBE: NOT DETECTED
RSV RNA NPH QL NAA+NON-PROBE: NOT DETECTED
SARS-COV-2 RNA RESP QL NAA+PROBE: NOT DETECTED
SODIUM SERPL-SCNC: 135 MMOL/L (ref 136–145)
SP GR UR STRIP: 1.01 (ref 1–1.03)
SQUAMOUS #/AREA URNS HPF: ABNORMAL /HPF
TROPONIN T % DELTA: 20
TROPONIN T NUMERIC DELTA: 29 NG/L
TROPONIN T SERPL HS-MCNC: 147 NG/L
TROPONIN T SERPL HS-MCNC: 216 NG/L
UROBILINOGEN UR QL STRIP: ABNORMAL
WBC # UR STRIP: ABNORMAL /HPF
WBC NRBC COR # BLD AUTO: 20.93 10*3/MM3 (ref 3.4–10.8)

## 2025-07-05 PROCEDURE — 94640 AIRWAY INHALATION TREATMENT: CPT

## 2025-07-05 PROCEDURE — 84484 ASSAY OF TROPONIN QUANT: CPT | Performed by: INTERNAL MEDICINE

## 2025-07-05 PROCEDURE — 87040 BLOOD CULTURE FOR BACTERIA: CPT | Performed by: INTERNAL MEDICINE

## 2025-07-05 PROCEDURE — 93005 ELECTROCARDIOGRAM TRACING: CPT | Performed by: INTERNAL MEDICINE

## 2025-07-05 PROCEDURE — 93010 ELECTROCARDIOGRAM REPORT: CPT | Performed by: HOSPITALIST

## 2025-07-05 PROCEDURE — 94799 UNLISTED PULMONARY SVC/PX: CPT

## 2025-07-05 PROCEDURE — 94761 N-INVAS EAR/PLS OXIMETRY MLT: CPT

## 2025-07-05 PROCEDURE — 99285 EMERGENCY DEPT VISIT HI MDM: CPT | Performed by: INTERNAL MEDICINE

## 2025-07-05 PROCEDURE — 0202U NFCT DS 22 TRGT SARS-COV-2: CPT | Performed by: INTERNAL MEDICINE

## 2025-07-05 PROCEDURE — 83605 ASSAY OF LACTIC ACID: CPT | Performed by: INTERNAL MEDICINE

## 2025-07-05 PROCEDURE — 87086 URINE CULTURE/COLONY COUNT: CPT | Performed by: INTERNAL MEDICINE

## 2025-07-05 PROCEDURE — 80053 COMPREHEN METABOLIC PANEL: CPT | Performed by: INTERNAL MEDICINE

## 2025-07-05 PROCEDURE — 36415 COLL VENOUS BLD VENIPUNCTURE: CPT

## 2025-07-05 PROCEDURE — 85025 COMPLETE CBC W/AUTO DIFF WBC: CPT | Performed by: INTERNAL MEDICINE

## 2025-07-05 PROCEDURE — 71045 X-RAY EXAM CHEST 1 VIEW: CPT

## 2025-07-05 PROCEDURE — 25010000002 CEFTRIAXONE PER 250 MG: Performed by: INTERNAL MEDICINE

## 2025-07-05 PROCEDURE — 25010000002 HEPARIN (PORCINE) PER 1000 UNITS: Performed by: INTERNAL MEDICINE

## 2025-07-05 PROCEDURE — 25810000003 LACTATED RINGERS SOLUTION: Performed by: INTERNAL MEDICINE

## 2025-07-05 PROCEDURE — 94664 DEMO&/EVAL PT USE INHALER: CPT

## 2025-07-05 PROCEDURE — 87186 SC STD MICRODIL/AGAR DIL: CPT | Performed by: INTERNAL MEDICINE

## 2025-07-05 PROCEDURE — 87077 CULTURE AEROBIC IDENTIFY: CPT | Performed by: INTERNAL MEDICINE

## 2025-07-05 PROCEDURE — 81001 URINALYSIS AUTO W/SCOPE: CPT | Performed by: INTERNAL MEDICINE

## 2025-07-05 PROCEDURE — P9612 CATHETERIZE FOR URINE SPEC: HCPCS

## 2025-07-05 RX ORDER — AMOXICILLIN 250 MG
2 CAPSULE ORAL 2 TIMES DAILY
Status: DISCONTINUED | OUTPATIENT
Start: 2025-07-05 | End: 2025-07-11 | Stop reason: HOSPADM

## 2025-07-05 RX ORDER — OXYCODONE AND ACETAMINOPHEN 5; 325 MG/1; MG/1
1 TABLET ORAL EVERY 6 HOURS PRN
Refills: 0 | Status: DISCONTINUED | OUTPATIENT
Start: 2025-07-05 | End: 2025-07-11 | Stop reason: HOSPADM

## 2025-07-05 RX ORDER — SODIUM CHLORIDE 9 MG/ML
40 INJECTION, SOLUTION INTRAVENOUS AS NEEDED
Status: DISCONTINUED | OUTPATIENT
Start: 2025-07-05 | End: 2025-07-11 | Stop reason: HOSPADM

## 2025-07-05 RX ORDER — CARVEDILOL 6.25 MG/1
6.25 TABLET ORAL 2 TIMES DAILY WITH MEALS
Status: DISCONTINUED | OUTPATIENT
Start: 2025-07-06 | End: 2025-07-11

## 2025-07-05 RX ORDER — BUDESONIDE AND FORMOTEROL FUMARATE DIHYDRATE 160; 4.5 UG/1; UG/1
2 AEROSOL RESPIRATORY (INHALATION)
Status: DISCONTINUED | OUTPATIENT
Start: 2025-07-05 | End: 2025-07-09

## 2025-07-05 RX ORDER — CLOPIDOGREL BISULFATE 75 MG/1
75 TABLET ORAL DAILY
Status: DISCONTINUED | OUTPATIENT
Start: 2025-07-06 | End: 2025-07-11 | Stop reason: HOSPADM

## 2025-07-05 RX ORDER — ISOSORBIDE DINITRATE 10 MG/1
10 TABLET ORAL
Status: DISCONTINUED | OUTPATIENT
Start: 2025-07-06 | End: 2025-07-11 | Stop reason: HOSPADM

## 2025-07-05 RX ORDER — ASPIRIN 81 MG/1
81 TABLET ORAL DAILY
Status: DISCONTINUED | OUTPATIENT
Start: 2025-07-06 | End: 2025-07-11 | Stop reason: HOSPADM

## 2025-07-05 RX ORDER — HEPARIN SODIUM 5000 [USP'U]/ML
5000 INJECTION, SOLUTION INTRAVENOUS; SUBCUTANEOUS EVERY 12 HOURS SCHEDULED
Status: DISCONTINUED | OUTPATIENT
Start: 2025-07-05 | End: 2025-07-11 | Stop reason: HOSPADM

## 2025-07-05 RX ORDER — CALCITRIOL 0.25 UG/1
0.5 CAPSULE, LIQUID FILLED ORAL DAILY
Status: DISCONTINUED | OUTPATIENT
Start: 2025-07-06 | End: 2025-07-11 | Stop reason: HOSPADM

## 2025-07-05 RX ORDER — BISACODYL 5 MG/1
5 TABLET, DELAYED RELEASE ORAL DAILY PRN
Status: DISCONTINUED | OUTPATIENT
Start: 2025-07-05 | End: 2025-07-11 | Stop reason: HOSPADM

## 2025-07-05 RX ORDER — GABAPENTIN 300 MG/1
300 CAPSULE ORAL NIGHTLY
Status: DISCONTINUED | OUTPATIENT
Start: 2025-07-05 | End: 2025-07-11 | Stop reason: HOSPADM

## 2025-07-05 RX ORDER — ATORVASTATIN CALCIUM 10 MG/1
10 TABLET, FILM COATED ORAL DAILY
Status: DISCONTINUED | OUTPATIENT
Start: 2025-07-06 | End: 2025-07-11 | Stop reason: HOSPADM

## 2025-07-05 RX ORDER — IRON POLYSACCHARIDE COMPLEX 150 MG
150 CAPSULE ORAL DAILY
Status: DISCONTINUED | OUTPATIENT
Start: 2025-07-06 | End: 2025-07-11 | Stop reason: HOSPADM

## 2025-07-05 RX ORDER — SODIUM CHLORIDE 0.9 % (FLUSH) 0.9 %
10 SYRINGE (ML) INJECTION EVERY 12 HOURS SCHEDULED
Status: DISCONTINUED | OUTPATIENT
Start: 2025-07-05 | End: 2025-07-11 | Stop reason: HOSPADM

## 2025-07-05 RX ORDER — POLYETHYLENE GLYCOL 3350 17 G/17G
17 POWDER, FOR SOLUTION ORAL DAILY PRN
Status: DISCONTINUED | OUTPATIENT
Start: 2025-07-05 | End: 2025-07-11 | Stop reason: HOSPADM

## 2025-07-05 RX ORDER — ACETAMINOPHEN 500 MG
1000 TABLET ORAL ONCE
Status: COMPLETED | OUTPATIENT
Start: 2025-07-05 | End: 2025-07-05

## 2025-07-05 RX ORDER — SODIUM CHLORIDE 0.9 % (FLUSH) 0.9 %
10 SYRINGE (ML) INJECTION AS NEEDED
Status: DISCONTINUED | OUTPATIENT
Start: 2025-07-05 | End: 2025-07-11 | Stop reason: HOSPADM

## 2025-07-05 RX ORDER — LEVOTHYROXINE SODIUM 100 UG/1
100 TABLET ORAL
Status: DISCONTINUED | OUTPATIENT
Start: 2025-07-06 | End: 2025-07-11 | Stop reason: HOSPADM

## 2025-07-05 RX ORDER — PANTOPRAZOLE SODIUM 40 MG/1
40 TABLET, DELAYED RELEASE ORAL
Status: DISCONTINUED | OUTPATIENT
Start: 2025-07-06 | End: 2025-07-11 | Stop reason: HOSPADM

## 2025-07-05 RX ORDER — OXYCODONE AND ACETAMINOPHEN 5; 325 MG/1; MG/1
1 TABLET ORAL ONCE
Refills: 0 | Status: COMPLETED | OUTPATIENT
Start: 2025-07-05 | End: 2025-07-05

## 2025-07-05 RX ORDER — BISACODYL 10 MG
10 SUPPOSITORY, RECTAL RECTAL DAILY PRN
Status: DISCONTINUED | OUTPATIENT
Start: 2025-07-05 | End: 2025-07-11 | Stop reason: HOSPADM

## 2025-07-05 RX ORDER — ACETAMINOPHEN 325 MG/1
650 TABLET ORAL EVERY 4 HOURS PRN
Status: DISCONTINUED | OUTPATIENT
Start: 2025-07-05 | End: 2025-07-11 | Stop reason: HOSPADM

## 2025-07-05 RX ORDER — MONTELUKAST SODIUM 10 MG/1
10 TABLET ORAL NIGHTLY
Status: DISCONTINUED | OUTPATIENT
Start: 2025-07-05 | End: 2025-07-11 | Stop reason: HOSPADM

## 2025-07-05 RX ADMIN — ACETAMINOPHEN 1000 MG: 500 TABLET, FILM COATED ORAL at 17:22

## 2025-07-05 RX ADMIN — MONTELUKAST 10 MG: 10 TABLET, FILM COATED ORAL at 22:20

## 2025-07-05 RX ADMIN — SODIUM CHLORIDE, SODIUM LACTATE, POTASSIUM CHLORIDE, CALCIUM CHLORIDE 500 ML: 20; 30; 600; 310 INJECTION, SOLUTION INTRAVENOUS at 20:26

## 2025-07-05 RX ADMIN — BUDESONIDE AND FORMOTEROL FUMARATE DIHYDRATE 2 PUFF: 160; 4.5 AEROSOL RESPIRATORY (INHALATION) at 22:07

## 2025-07-05 RX ADMIN — CEFTRIAXONE SODIUM 1000 MG: 1 INJECTION, POWDER, FOR SOLUTION INTRAMUSCULAR; INTRAVENOUS at 19:37

## 2025-07-05 RX ADMIN — Medication 10 ML: at 22:20

## 2025-07-05 RX ADMIN — IPRATROPIUM BROMIDE 0.5 MG: 0.5 SOLUTION RESPIRATORY (INHALATION) at 22:07

## 2025-07-05 RX ADMIN — OXYCODONE HYDROCHLORIDE AND ACETAMINOPHEN 1 TABLET: 5; 325 TABLET ORAL at 19:10

## 2025-07-05 RX ADMIN — GABAPENTIN 300 MG: 300 CAPSULE ORAL at 22:20

## 2025-07-05 RX ADMIN — HEPARIN SODIUM 5000 UNITS: 5000 INJECTION INTRAVENOUS; SUBCUTANEOUS at 22:20

## 2025-07-05 NOTE — ED PROVIDER NOTES
Subjective   History of Present Illness  75-year-old male who presents to the emergency department for altered mental status.  The patient has a complex medical history.  He is currently at a nursing facility.  He is receiving dialysis through a chest wall permacath, and has been doing this for the last month.  Was noted to have a fever of 105 after his return from dialysis today.  Normally family notes that he is awake and alert.  It was reported by the nursing facility staff that he was confused.  He is able to recognize family at bedside.  He is drowsy.  The patient also recently had a right above-the-knee amputation.  The ex-wife at bed side says that a friend went to visit him yesterday and said he was good.  They have not noted anyone to say any issues with his recent surgery to include discharge or problems with the incision.  The ex-wife notes that the eating has improved.  The patient is without complaint.    Review of Systems   Constitutional:  Positive for fever. Negative for chills.   Respiratory:  Negative for shortness of breath.    Cardiovascular:  Negative for chest pain.   Gastrointestinal:  Negative for nausea and vomiting.   Psychiatric/Behavioral:  Positive for confusion.        Past Medical History:   Diagnosis Date    3-vessel CAD 08/11/2020    Allergic rhinitis     Anemia     Anxiety disorder 04/27/2020    Arthritis     Asymmetrical sensorineural hearing loss 06/28/2017    Atherosclerosis of native artery of both lower extremities with intermittent claudication 07/18/2019    Avascular necrosis of femoral head, left 07/11/2020    right hip after surgery    Carotid stenosis     Chronic mucoid otitis media     Chronic rhinitis     COPD (chronic obstructive pulmonary disease)     Coronary artery disease     HEART BYPASS 2004    Crohn's disease of large intestine with other complication 07/30/2020    Chronic diarrhea Colonoscopy July 2020 revealed mild patchy scattered hemosiderin staining with  inflammation more so in rectosigmoid area.  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    Iron deficiency anemia 07/14/2020    Mixed hearing loss of left ear     PAD (peripheral artery disease) 08/11/2020    Perianal abscess     Pernicious anemia 08/17/2020    took shots but never diagnosed with b12 deficiency    Personal history of alcoholism 08/11/2020    quit drinking in 2013    Prostatic hypertrophy 08/11/2020    Sensorineural hearing loss     Sepsis with acute renal failure 09/15/2020    Shortness of breath 05/27/2021    Tinnitus     Ventricular tachycardia, nonsustained 07/14/2020    Weight loss 07/11/2020       Allergies   Allergen Reactions    Ondansetron Anaphylaxis and GI Intolerance    Zofran [Ondansetron Hcl] Anaphylaxis    Lortab [Hydrocodone-Acetaminophen] Other (See Comments) and Hallucinations     CLOSTROPHOBIC    Allopurinol Other (See Comments)     Pain on right side       Past Surgical History:   Procedure Laterality Date    ABOVE KNEE AMPUTATION Right 6/24/2025    Procedure: right above knee amputation;  Surgeon: Blayne Zabala MD;  Location: [facility] OR;  Service: Vascular;  Laterality: Right;    AORTOGRAM Right 4/25/2025    Procedure: RIGHT LOWER EXTREMITY ANGIOGRAM, SHOCKWAVE LITHOTRIPSY, BALLOON ANGIOPLASTY, MYNX CLOSURE;  Surgeon: Gil Pineda DO;  Location:  [facility];  Service: Vascular;  Laterality: Right;    AORTOGRAM Left 5/22/2025    Procedure: LEFT LOWER EXTREMITY ANGIOGRAM, SHOCKWAVE LITHOTRIPSY, BALLOON ANGIOPLASTY, STENT PLACEMENT, MYNX CLOSURE;  Surgeon: Blayne Zabala MD;  Location:  [facility];  Service:  Vascular;  Laterality: Left;    AORTOGRAM Right 5/30/2025    Procedure: AORTOILIAC ANGIOGRAM WITH LEFT LOWER EXTREMITY ANGIOGRAM;  Surgeon: Gil Pineda DO;  Location: [facility] OR;  Service: Vascular;  Laterality: Right;    AORTOGRAM Right 6/20/2025    Procedure: right lower extremity arteriogram, shockwave lithotripsy, balloon angioplasty, mynx closue;  Surgeon: Blayne Zabala MD;  Location: [facility] OR;  Service: Vascular;  Laterality: Right;    ARTERY SURGERY  2021    right side on neck    CAROTID ENDARTERECTOMY Right 05/10/2021    Procedure: RIGHT CAROTID ENDARTERECTOMY WITH EEG;  Surgeon: Gil Pineda DO;  Location: [facility] OR 12;  Service: Vascular;  Laterality: Right;    COLONOSCOPY N/A 07/02/2020    Procedure: COLONOSCOPY WITH ANESTHESIA;  Surgeon: Adrien Brewster MD;  Location: [facility] ENDOSCOPY;  Service: Gastroenterology;  Laterality: N/A;  pre op: diarrhea  post op: polyps  PCP: Joe Velasco MD    COLONOSCOPY N/A 10/13/2020    Procedure: COLONOSCOPY WITH ANESTHESIA;  Surgeon: Adrien Brewster MD;  Location: [facility]COPY;  Service: Gastroenterology;  Laterality: N/A;  Pre: Chronic Diarrhea, Crohn's  Post: AVM  Dr. Neftali Velasco  CO2 Inflation Used    COLONOSCOPY N/A 12/08/2023    Procedure: COLONOSCOPY WITH ANESTHESIA;  Surgeon: Adrien Brewster MD;  Location: [facility];  Service: Gastroenterology;  Laterality: N/A;  pre chrone's disease  post sub optimal prep, polyp, chrone's      CORONARY ARTERY BYPASS GRAFT  2003    x3    ENDOSCOPY N/A 11/02/2021    Procedure: ESOPHAGOGASTRODUODENOSCOPY WITH ANESTHESIA;  Surgeon: Bridger Bell MD;  Location: [facility] END[facility]COPY;  Service: Gastroenterology;  Laterality: N/A;  pre anemia;gi bleed  post  gi bleed;schatski ring  Dr. ERIC Velasco    ENDOSCOPY N/A 10/10/2023    Procedure: ESOPHAGOGASTRODUODENOSCOPY WITH ANESTHESIA;  Surgeon: Adrien Brewster MD;  Location: [facility];  Service:  Gastroenterology;  Laterality: N/A;  preop; anemia  postop esophagitis ; R/O barretts   PCP Randall Beata    EYE SURGERY Bilateral     catorac    INCISION AND DRAINAGE PERIRECTAL ABSCESS N/A 2017    Procedure: INCISION AND DRAINAGE OF JEET ANAL ABSCESS;  Surgeon: Lynette Smith MD;  Location:  [facility] OR;  Service:     INGUINAL HERNIA REPAIR Bilateral 2023    Procedure: INGUINAL HERNIA BILATERAL REPAIR LAPAROSCOPIC WITH DAVINCI ROBOT WITH MESH;  Surgeon: Tahira Rivera MD;  Location:  [facility] OR;  Service: Robotics - DaVinci;  Laterality: Bilateral;    INSERTION HEMODIALYSIS CATHETER N/A 2025    Procedure: HEMODIALYSIS CATHETER PLACEMENT;  Surgeon: Gil Pineda DO;  Location:  [facility];  Service: Vascular;  Laterality: N/A;    MYRINGOTOMY W/ TUBES Left 2017    06/10/2016    TONSILLECTOMY      TOTAL HIP ARTHROPLASTY Right        Family History   Problem Relation Age of Onset    Breast cancer Mother     Dementia Father     Glaucoma Father     No Known Problems Daughter     Colon polyps Neg Hx     Colon cancer Neg Hx        Social History     Socioeconomic History    Marital status:    Tobacco Use    Smoking status: Former     Current packs/day: 0.00     Average packs/day: 0.5 packs/day for 25.8 years (12.9 ttl pk-yrs)     Types: Cigarettes     Start date:      Quit date: 10/13/2013     Years since quittin.7     Passive exposure: Past    Smokeless tobacco: Never    Tobacco comments:     quit 2013   Vaping Use    Vaping status: Former    Substances: Nicotine    Devices: Pre-filled or refillable cartridge   Substance and Sexual Activity    Alcohol use: Not Currently    Drug use: No    Sexual activity: Not Currently     Partners: Female           Objective   Physical Exam  Constitutional:       Appearance: He is ill-appearing.      Comments: The patient is drowsy but will follow simple directions and opens his eyes to his name.  Thin and chronically  ill-appearing   HENT:      Head: Normocephalic and atraumatic.   Eyes:      Extraocular Movements: Extraocular movements intact.   Cardiovascular:      Rate and Rhythm: Normal rate and regular rhythm.   Pulmonary:      Effort: Pulmonary effort is normal. No respiratory distress.      Breath sounds: Normal breath sounds.   Abdominal:      General: There is no distension.      Palpations: Abdomen is soft.      Tenderness: There is no abdominal tenderness.   Musculoskeletal:      Cervical back: Neck supple.   Skin:     General: Skin is warm.      Comments: He right postop incision is stapled.  There is a mild area in the center that has minimal surrounding erythema and some mild drainage.  The wound otherwise is appropriate.  No surrounding erythema or edema.   Neurological:      Mental Status: He is lethargic and confused.      Cranial Nerves: No dysarthria.   Psychiatric:         Mood and Affect: Mood normal.         Speech: Speech normal.         Procedures           ED Course  ED Course as of 07/05/25 2104   Sat Jul 05, 2025   1916 Did not order sepsis bolus because this is a dialysis patient and concern for overload.  [AJ]   2048 Dr. Issa is agreeable to admission. I will reach out to Dr. Raines and discuss troponin.  [AJ]   2101 I have spoke with Dr. Raines and he is aware of the patient.  [AJ]      ED Course User Index  [AJ] Shana Irizarry,                                         Lab Results (last 24 hours)       Procedure Component Value Units Date/Time    Respiratory Panel PCR w/COVID-19(SARS-CoV-2) [facility] In-House, NP Swab in UTM/VTM, 2 HR TAT - Swab, Nasopharynx [[id number]]  (Normal) Collected: 07/05/25 1725    Specimen: Swab from Nasopharynx Updated: 07/05/25 1829     ADENOVIRUS, PCR Not Detected     Coronavirus 229E Not Detected     Coronavirus HKU1 Not Detected     Coronavirus NL63 Not Detected     Coronavirus OC43 Not Detected     COVID19 Not Detected     Human  Metapneumovirus Not Detected     Human Rhinovirus/Enterovirus Not Detected     Influenza A PCR Not Detected     Influenza B PCR Not Detected     Parainfluenza Virus 1 Not Detected     Parainfluenza Virus 2 Not Detected     Parainfluenza Virus 3 Not Detected     Parainfluenza Virus 4 Not Detected     RSV, PCR Not Detected     Bordetella pertussis pcr Not Detected     Bordetella parapertussis PCR Not Detected     Chlamydophila pneumoniae PCR Not Detected     Mycoplasma pneumo by PCR Not Detected    Narrative:      In the setting of a positive respiratory panel with a viral infection PLUS a negative procalcitonin without other underlying concern for bacterial infection, consider observing off antibiotics or discontinuation of antibiotics and continue supportive care. If the respiratory panel is positive for atypical bacterial infection (Bordetella pertussis, Chlamydophila pneumoniae, or Mycoplasma pneumoniae), consider antibiotic de-escalation to target atypical bacterial infection.    Blood Culture - Blood, Arm, Left [[id number]] Collected: 07/05/25 1731    Specimen: Blood from Arm, Left Updated: 07/05/25 1739    Blood Culture - Blood, Arm, Left [[id number]] Collected: 07/05/25 1731    Specimen: Blood from Arm, Left Updated: 07/05/25 1921    CBC & Differential [[id number]]  (Abnormal) Collected: 07/05/25 1756    Specimen: Blood from Arm, Left Updated: 07/05/25 1811    Narrative:      The following orders were created for panel order CBC & Differential.  Procedure                               Abnormality         Status                     ---------                               -----------         ------                     CBC Auto Differential[[id number]]        Abnormal            Final result                 Please view results for these tests on the individual orders.    Comprehensive Metabolic Panel [[id number]]  (Abnormal) Collected: 07/05/25 1756    Specimen: Blood from Arm, Left Updated: 07/05/25 1933     Glucose  103 mg/dL      BUN 29.6 mg/dL      Creatinine 2.02 mg/dL      Sodium 135 mmol/L      Potassium 3.7 mmol/L      Chloride 99 mmol/L      CO2 24.0 mmol/L      Calcium 8.4 mg/dL      Total Protein 5.4 g/dL      Albumin 2.8 g/dL      ALT (SGPT) 39 U/L      AST (SGOT) 46 U/L      Alkaline Phosphatase 322 U/L      Total Bilirubin 0.3 mg/dL      Globulin 2.6 gm/dL      A/G Ratio 1.1 g/dL      BUN/Creatinine Ratio 14.7     Anion Gap 12.0 mmol/L      eGFR 33.3 mL/min/1.73     Narrative:      GFR Categories in Chronic Kidney Disease (CKD)              GFR Category          GFR (mL/min/1.73)    Interpretation  G1                    90 or greater        Normal or high (1)  G2                    60-89                Mild decrease (1)  G3a                   45-59                Mild to moderate decrease  G3b                   30-44                Moderate to severe decrease  G4                    15-29                Severe decrease  G5                    14 or less           Kidney failure    (1)In the absence of evidence of kidney disease, neither GFR category G1 or G2 fulfill the criteria for CKD.    eGFR calculation 2021 CKD-EPI creatinine equation, which does not include race as a factor    High Sensitivity Troponin T [[id number]]  (Abnormal) Collected: 07/05/25 1756    Specimen: Blood from Arm, Left Updated: 07/05/25 1937     HS Troponin T 147 ng/L     Narrative:      High Sensitive Troponin T Reference Range:  <14.0 ng/L- Negative Female for AMI  <22.0 ng/L- Negative Male for AMI  >=14 - Abnormal Female indicating possible myocardial injury.  >=22 - Abnormal Male indicating possible myocardial injury.   Clinicians would have to utilize clinical acumen, EKG, Troponin, and serial changes to determine if it is an Acute Myocardial Infarction or myocardial injury due to an underlying chronic condition.         CBC Auto Differential [[id number]]  (Abnormal) Collected: 07/05/25 1756    Specimen: Blood from Arm, Left Updated:  07/05/25 1811     WBC 20.93 10*3/mm3      RBC 2.40 10*6/mm3      Hemoglobin 7.7 g/dL      Hematocrit 23.4 %      MCV 97.5 fL      MCH 32.1 pg      MCHC 32.9 g/dL      RDW 16.2 %      RDW-SD 56.2 fl      MPV 9.7 fL      Platelets 170 10*3/mm3      Neutrophil % 85.8 %      Lymphocyte % 3.7 %      Monocyte % 7.6 %      Eosinophil % 0.9 %      Basophil % 0.3 %      Immature Grans % 1.7 %      Neutrophils, Absolute 17.95 10*3/mm3      Lymphocytes, Absolute 0.77 10*3/mm3      Monocytes, Absolute 1.60 10*3/mm3      Eosinophils, Absolute 0.18 10*3/mm3      Basophils, Absolute 0.07 10*3/mm3      Immature Grans, Absolute 0.36 10*3/mm3      nRBC 0.0 /100 WBC     Lactic Acid, Plasma [[id number]]  (Normal) Collected: 07/05/25 1758    Specimen: Blood from Arm, Left Updated: 07/05/25 1825     Lactate 2.0 mmol/L     Urinalysis With Culture If Indicated - Straight Cath [[id number]]  (Abnormal) Collected: 07/05/25 1841    Specimen: Urine from Straight Cath Updated: 07/05/25 1913     Color, UA Yellow     Appearance, UA Turbid     pH, UA 7.5     Specific Gravity, UA 1.014     Glucose,  mg/dL (Trace)     Ketones, UA Negative     Bilirubin, UA Negative     Blood, UA Large (3+)     Protein, UA >=300 mg/dL (3+)     Leuk Esterase, UA Large (3+)     Nitrite, UA Positive     Urobilinogen, UA 1.0 E.U./dL    Narrative:      In absence of clinical symptoms, the presence of pyuria, bacteria, and/or nitrites on the urinalysis result does not correlate with infection.    Urinalysis, Microscopic Only - Straight Cath [[id number]]  (Abnormal) Collected: 07/05/25 1841    Specimen: Urine from Straight Cath Updated: 07/05/25 1913     RBC, UA Too Numerous to Count /HPF      WBC, UA Too Numerous to Count /HPF      Bacteria, UA 4+ /HPF      Squamous Epithelial Cells, UA None Seen /HPF      Hyaline Casts, UA None Seen /LPF      Methodology Manual Light Microscopy    Urine Culture - Urine, Straight Cath [[id number]] Collected: 07/05/25 3217    Specimen:  Urine from Straight Cath Updated: 07/05/25 1913    High Sensitivity Troponin T 1Hr [[id number]]  (Abnormal) Collected: 07/05/25 1946    Specimen: Blood from Arm, Left Updated: 07/05/25 2015     HS Troponin T 176 ng/L      Troponin T Numeric Delta 29 ng/L      Troponin T % Delta 20    Narrative:      High Sensitive Troponin T Reference Range:  <14.0 ng/L- Negative Female for AMI  <22.0 ng/L- Negative Male for AMI  >=14 - Abnormal Female indicating possible myocardial injury.  >=22 - Abnormal Male indicating possible myocardial injury.   Clinicians would have to utilize clinical acumen, EKG, Troponin, and serial changes to determine if it is an Acute Myocardial Infarction or myocardial injury due to an underlying chronic condition.               XR Chest 1 View   Final Result   Partial resolution of interstitial infiltrates in the right   lung, mild residual right-sided interstitial pneumonitis or mild   asymmetric pulmonary edema may be present. No lung consolidation or   acute focal infiltrate. Previous CABG. The right internal jugular   central venous catheter remains in good position.       This report was signed and finalized on 7/5/2025 5:26 PM by Dr. Ash Seay MD.                           Medical Decision Making  Differential diagnosis includes viral illness, UTI, sepsis, permacath infection    Problems Addressed:  Acute cystitis without hematuria: complicated acute illness or injury  Confusion: complicated acute illness or injury  Elevated troponin: complicated acute illness or injury    Amount and/or Complexity of Data Reviewed  Labs: ordered.     Details: CBC CMP UA lactic blood cultures  Radiology: ordered.     Details: Chest x-ray  ECG/medicine tests: ordered.    Risk  OTC drugs.  Prescription drug management.  Decision regarding hospitalization.        Final diagnoses:   Acute cystitis without hematuria   Elevated troponin   Confusion       ED Disposition  ED Disposition       ED Disposition    Decision to Admit    Condition   --    Comment   Level of Care: Telemetry [5]   Diagnosis: Sepsis [[id number]]   Admitting Physician: TIANNA GAVIRIA [[id number]]   Attending Physician: TIANNA GAVIRIA [[id number]]   Certification: I Certify That Inpatient Hospital Services Are Medically Necessary For Greater Than 2 Midnights                 No follow-up provider specified.       Medication List      No changes were made to your prescriptions during this visit.            Shana Irizarry,   07/05/25 2051       Shana Irizarry,   07/05/25 2105

## 2025-07-06 PROBLEM — R79.89 ELEVATED TROPONIN: Status: ACTIVE | Noted: 2025-07-06

## 2025-07-06 PROBLEM — I25.10 CORONARY ARTERY DISEASE INVOLVING NATIVE CORONARY ARTERY OF NATIVE HEART WITHOUT ANGINA PECTORIS: Status: ACTIVE | Noted: 2025-07-06

## 2025-07-06 LAB
ALBUMIN SERPL-MCNC: 2.9 G/DL (ref 3.5–5.2)
ALBUMIN/GLOB SERPL: 1.1 G/DL
ALP SERPL-CCNC: 312 U/L (ref 39–117)
ALT SERPL W P-5'-P-CCNC: 48 U/L (ref 1–41)
ANION GAP SERPL CALCULATED.3IONS-SCNC: 10 MMOL/L (ref 5–15)
AST SERPL-CCNC: 51 U/L (ref 1–40)
BASOPHILS # BLD AUTO: 0.09 10*3/MM3 (ref 0–0.2)
BASOPHILS NFR BLD AUTO: 0.4 % (ref 0–1.5)
BILIRUB SERPL-MCNC: 0.4 MG/DL (ref 0–1.2)
BUN SERPL-MCNC: 33.9 MG/DL (ref 8–23)
BUN/CREAT SERPL: 13.7 (ref 7–25)
CALCIUM SPEC-SCNC: 8.5 MG/DL (ref 8.6–10.5)
CHLORIDE SERPL-SCNC: 97 MMOL/L (ref 98–107)
CO2 SERPL-SCNC: 25 MMOL/L (ref 22–29)
CREAT SERPL-MCNC: 2.47 MG/DL (ref 0.76–1.27)
DEPRECATED RDW RBC AUTO: 59.7 FL (ref 37–54)
EGFRCR SERPLBLD CKD-EPI 2021: 26.2 ML/MIN/1.73
EOSINOPHIL # BLD AUTO: 0.11 10*3/MM3 (ref 0–0.4)
EOSINOPHIL NFR BLD AUTO: 0.5 % (ref 0.3–6.2)
ERYTHROCYTE [DISTWIDTH] IN BLOOD BY AUTOMATED COUNT: 17.1 % (ref 12.3–15.4)
GLOBULIN UR ELPH-MCNC: 2.7 GM/DL
GLUCOSE SERPL-MCNC: 94 MG/DL (ref 65–99)
HBV SURFACE AB SER RIA-ACNC: NORMAL
HBV SURFACE AG SERPL QL IA: NORMAL
HCT VFR BLD AUTO: 27.4 % (ref 37.5–51)
HGB BLD-MCNC: 8.7 G/DL (ref 13–17.7)
IMM GRANULOCYTES # BLD AUTO: 0.58 10*3/MM3 (ref 0–0.05)
IMM GRANULOCYTES NFR BLD AUTO: 2.6 % (ref 0–0.5)
LYMPHOCYTES # BLD AUTO: 1.24 10*3/MM3 (ref 0.7–3.1)
LYMPHOCYTES NFR BLD AUTO: 5.5 % (ref 19.6–45.3)
MAGNESIUM SERPL-MCNC: 1.9 MG/DL (ref 1.6–2.4)
MCH RBC QN AUTO: 31.6 PG (ref 26.6–33)
MCHC RBC AUTO-ENTMCNC: 31.8 G/DL (ref 31.5–35.7)
MCV RBC AUTO: 99.6 FL (ref 79–97)
MONOCYTES # BLD AUTO: 1.93 10*3/MM3 (ref 0.1–0.9)
MONOCYTES NFR BLD AUTO: 8.5 % (ref 5–12)
NEUTROPHILS NFR BLD AUTO: 18.75 10*3/MM3 (ref 1.7–7)
NEUTROPHILS NFR BLD AUTO: 82.5 % (ref 42.7–76)
NRBC BLD AUTO-RTO: 0 /100 WBC (ref 0–0.2)
PHOSPHATE SERPL-MCNC: 2.4 MG/DL (ref 2.5–4.5)
PLATELET # BLD AUTO: 159 10*3/MM3 (ref 140–450)
PMV BLD AUTO: 9.7 FL (ref 6–12)
POTASSIUM SERPL-SCNC: 3.7 MMOL/L (ref 3.5–5.2)
PROT SERPL-MCNC: 5.6 G/DL (ref 6–8.5)
QT INTERVAL: 422 MS
QT INTERVAL: 468 MS
QTC INTERVAL: 498 MS
QTC INTERVAL: 526 MS
RBC # BLD AUTO: 2.75 10*6/MM3 (ref 4.14–5.8)
SODIUM SERPL-SCNC: 132 MMOL/L (ref 136–145)
WBC NRBC COR # BLD AUTO: 22.7 10*3/MM3 (ref 3.4–10.8)

## 2025-07-06 PROCEDURE — 99222 1ST HOSP IP/OBS MODERATE 55: CPT | Performed by: NURSE PRACTITIONER

## 2025-07-06 PROCEDURE — 94799 UNLISTED PULMONARY SVC/PX: CPT

## 2025-07-06 PROCEDURE — 83735 ASSAY OF MAGNESIUM: CPT | Performed by: INTERNAL MEDICINE

## 2025-07-06 PROCEDURE — 85025 COMPLETE CBC W/AUTO DIFF WBC: CPT | Performed by: INTERNAL MEDICINE

## 2025-07-06 PROCEDURE — 94761 N-INVAS EAR/PLS OXIMETRY MLT: CPT

## 2025-07-06 PROCEDURE — 86706 HEP B SURFACE ANTIBODY: CPT | Performed by: INTERNAL MEDICINE

## 2025-07-06 PROCEDURE — 87340 HEPATITIS B SURFACE AG IA: CPT | Performed by: INTERNAL MEDICINE

## 2025-07-06 PROCEDURE — 94664 DEMO&/EVAL PT USE INHALER: CPT

## 2025-07-06 PROCEDURE — 25010000002 CEFTRIAXONE PER 250 MG: Performed by: INTERNAL MEDICINE

## 2025-07-06 PROCEDURE — 80053 COMPREHEN METABOLIC PANEL: CPT | Performed by: INTERNAL MEDICINE

## 2025-07-06 PROCEDURE — 25010000002 HEPARIN (PORCINE) PER 1000 UNITS: Performed by: INTERNAL MEDICINE

## 2025-07-06 PROCEDURE — 36415 COLL VENOUS BLD VENIPUNCTURE: CPT | Performed by: INTERNAL MEDICINE

## 2025-07-06 PROCEDURE — 84100 ASSAY OF PHOSPHORUS: CPT | Performed by: INTERNAL MEDICINE

## 2025-07-06 RX ORDER — TAMSULOSIN HYDROCHLORIDE 0.4 MG/1
0.4 CAPSULE ORAL NIGHTLY
Status: DISCONTINUED | OUTPATIENT
Start: 2025-07-06 | End: 2025-07-06

## 2025-07-06 RX ORDER — NIFEDIPINE 60 MG/1
120 TABLET, EXTENDED RELEASE ORAL DAILY
Status: DISCONTINUED | OUTPATIENT
Start: 2025-07-06 | End: 2025-07-09

## 2025-07-06 RX ORDER — HYDRALAZINE HYDROCHLORIDE 50 MG/1
50 TABLET, FILM COATED ORAL 3 TIMES DAILY
Status: DISCONTINUED | OUTPATIENT
Start: 2025-07-06 | End: 2025-07-11 | Stop reason: HOSPADM

## 2025-07-06 RX ORDER — TAMSULOSIN HYDROCHLORIDE 0.4 MG/1
0.4 CAPSULE ORAL NIGHTLY
Status: DISCONTINUED | OUTPATIENT
Start: 2025-07-06 | End: 2025-07-11 | Stop reason: HOSPADM

## 2025-07-06 RX ORDER — FLUTICASONE PROPIONATE 50 MCG
2 SPRAY, SUSPENSION (ML) NASAL DAILY PRN
Status: DISCONTINUED | OUTPATIENT
Start: 2025-07-06 | End: 2025-07-11 | Stop reason: HOSPADM

## 2025-07-06 RX ORDER — ALBUTEROL SULFATE 0.63 MG/3ML
0.63 SOLUTION RESPIRATORY (INHALATION) EVERY 6 HOURS PRN
Status: DISCONTINUED | OUTPATIENT
Start: 2025-07-06 | End: 2025-07-11 | Stop reason: HOSPADM

## 2025-07-06 RX ORDER — VALSARTAN 80 MG/1
320 TABLET ORAL DAILY
Status: DISCONTINUED | OUTPATIENT
Start: 2025-07-06 | End: 2025-07-11 | Stop reason: HOSPADM

## 2025-07-06 RX ADMIN — MONTELUKAST 10 MG: 10 TABLET, FILM COATED ORAL at 20:18

## 2025-07-06 RX ADMIN — CARVEDILOL 6.25 MG: 6.25 TABLET, FILM COATED ORAL at 08:49

## 2025-07-06 RX ADMIN — IPRATROPIUM BROMIDE 0.5 MG: 0.5 SOLUTION RESPIRATORY (INHALATION) at 10:08

## 2025-07-06 RX ADMIN — Medication 10 ML: at 20:19

## 2025-07-06 RX ADMIN — ISOSORBIDE DINITRATE 10 MG: 10 TABLET ORAL at 08:48

## 2025-07-06 RX ADMIN — OXYCODONE HYDROCHLORIDE AND ACETAMINOPHEN 1 TABLET: 5; 325 TABLET ORAL at 17:33

## 2025-07-06 RX ADMIN — CALCITRIOL CAPSULES 0.25 MCG 0.5 MCG: 0.25 CAPSULE ORAL at 08:49

## 2025-07-06 RX ADMIN — IPRATROPIUM BROMIDE 0.5 MG: 0.5 SOLUTION RESPIRATORY (INHALATION) at 13:46

## 2025-07-06 RX ADMIN — Medication 150 MG: at 08:48

## 2025-07-06 RX ADMIN — ASPIRIN 81 MG: 81 TABLET, COATED ORAL at 08:48

## 2025-07-06 RX ADMIN — Medication 10 ML: at 08:46

## 2025-07-06 RX ADMIN — IPRATROPIUM BROMIDE 0.5 MG: 0.5 SOLUTION RESPIRATORY (INHALATION) at 05:42

## 2025-07-06 RX ADMIN — ISOSORBIDE DINITRATE 10 MG: 10 TABLET ORAL at 12:25

## 2025-07-06 RX ADMIN — IPRATROPIUM BROMIDE 0.5 MG: 0.5 SOLUTION RESPIRATORY (INHALATION) at 19:20

## 2025-07-06 RX ADMIN — BUDESONIDE AND FORMOTEROL FUMARATE DIHYDRATE 2 PUFF: 160; 4.5 AEROSOL RESPIRATORY (INHALATION) at 19:20

## 2025-07-06 RX ADMIN — ATORVASTATIN CALCIUM 10 MG: 10 TABLET, FILM COATED ORAL at 08:48

## 2025-07-06 RX ADMIN — PANTOPRAZOLE SODIUM 40 MG: 40 TABLET, DELAYED RELEASE ORAL at 08:49

## 2025-07-06 RX ADMIN — LEVOTHYROXINE SODIUM 100 MCG: 0.1 TABLET ORAL at 08:48

## 2025-07-06 RX ADMIN — GABAPENTIN 300 MG: 300 CAPSULE ORAL at 20:18

## 2025-07-06 RX ADMIN — CEFTRIAXONE 2000 MG: 2 INJECTION, POWDER, FOR SOLUTION INTRAMUSCULAR; INTRAVENOUS at 17:35

## 2025-07-06 RX ADMIN — EMPAGLIFLOZIN 10 MG: 10 TABLET, FILM COATED ORAL at 12:25

## 2025-07-06 RX ADMIN — CLOPIDOGREL BISULFATE 75 MG: 75 TABLET, FILM COATED ORAL at 08:49

## 2025-07-06 RX ADMIN — NIFEDIPINE 120 MG: 60 TABLET, FILM COATED, EXTENDED RELEASE ORAL at 12:25

## 2025-07-06 RX ADMIN — ISOSORBIDE DINITRATE 10 MG: 10 TABLET ORAL at 17:33

## 2025-07-06 RX ADMIN — HEPARIN SODIUM 5000 UNITS: 5000 INJECTION INTRAVENOUS; SUBCUTANEOUS at 20:18

## 2025-07-06 RX ADMIN — HEPARIN SODIUM 5000 UNITS: 5000 INJECTION INTRAVENOUS; SUBCUTANEOUS at 08:44

## 2025-07-06 RX ADMIN — TAMSULOSIN HYDROCHLORIDE 0.4 MG: 0.4 CAPSULE ORAL at 20:18

## 2025-07-06 RX ADMIN — DOCUSATE SODIUM 50 MG AND SENNOSIDES 8.6 MG 2 TABLET: 8.6; 5 TABLET, FILM COATED ORAL at 08:48

## 2025-07-06 NOTE — CONSULTS
[facility] CONSULT NOTE    Referring Provider: No Known Provider    Reason for Consultation: NSTEM in pt w/hx CAD and prior CABG, eval and recs    Chief complaint:   Chief Complaint   Patient presents with    Altered Mental Status       Subjective .     History of present illness:  Ricardo Hugo is a 75 y.o. male with a significant vascular history including CAD s/p CABG around 2004, recent right AKA for an ischemic limb and necrotic toe. He also has chronic diastolic CHF and ESRD and recently started dialysis. He is currently at a SNF following his recent AKA in June. He missed dialysis on Friday due to an issue with transportation therefore went to dialysis yesterday. Following his treatment, he had a temp of 105 and was confused. Labs on arrival revealed WBC 21, hgb 7.7, stable creatinine 2.02, HS tropon of 147 with delta of 29, UA 4+ bacteria. Temp was 103 on arrival which has improved with PRN tylenol. CXR revealed right sided pneumonitis vs mild asymmetric pulmonary edema. EKG noted worsening ST depression in V5 and V6 and ST T wave changes in leads II and V4. He is currently laying in bed. He denies chest pain, pressure and tightness. He notes prior to his CABG, he had severe substernal chest pain and has not had any pain like that since then. He also has not had any ischemic studies since CABG.       History  Past Medical History:   Diagnosis Date    3-vessel CAD 08/11/2020    Allergic rhinitis     Anemia     Anxiety disorder 04/27/2020    Arthritis     Asymmetrical sensorineural hearing loss 06/28/2017    Atherosclerosis of native artery of both lower extremities with intermittent claudication 07/18/2019    Avascular necrosis of femoral head, left 07/11/2020    right hip after surgery    Carotid stenosis     Chronic mucoid otitis media     Chronic rhinitis     COPD (chronic obstructive pulmonary disease)     Coronary artery disease     HEART BYPASS 2004    Crohn's disease of large  intestine with other complication 07/30/2020    Chronic diarrhea Colonoscopy July 2020 revealed mild patchy scattered hemosiderin staining with inflammation more so in rectosigmoid area.  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    Iron deficiency anemia 07/14/2020    Mixed hearing loss of left ear     PAD (peripheral artery disease) 08/11/2020    Perianal abscess     Pernicious anemia 08/17/2020    took shots but never diagnosed with b12 deficiency    Personal history of alcoholism 08/11/2020    quit drinking in 2013    Prostatic hypertrophy 08/11/2020    Sensorineural hearing loss     Sepsis with acute renal failure 09/15/2020    Shortness of breath 05/27/2021    Tinnitus     Ventricular tachycardia, nonsustained 07/14/2020    Weight loss 07/11/2020   ,   Past Surgical History:   Procedure Laterality Date    ABOVE KNEE AMPUTATION Right 6/24/2025    Procedure: right above knee amputation;  Surgeon: Blayne Zabala MD;  Location:  [facility] OR;  Service: Vascular;  Laterality: Right;    AORTOGRAM Right 4/25/2025    Procedure: RIGHT LOWER EXTREMITY ANGIOGRAM, SHOCKWAVE LITHOTRIPSY, BALLOON ANGIOPLASTY, MYNX CLOSURE;  Surgeon: Gil Pineda DO;  Location:  [facility]BRID [facility];  Service: Vascular;  Laterality: Right;    AORTOGRAM Left 5/22/2025    Procedure: LEFT LOWER EXTREMITY ANGIOGRAM, SHOCKWAVE LITHOTRIPSY, BALLOON ANGIOPLASTY, STENT PLACEMENT, MYNX CLOSURE;  Surgeon: Blayne Zabala MD;  Location:  [facility];  Service: Vascular;  Laterality: Left;    AORTOGRAM Right 5/30/2025    Procedure: AORTOILIAC ANGIOGRAM WITH LEFT LOWER EXTREMITY ANGIOGRAM;   Surgeon: Gil Pineda DO;  Location: [facility] HYBRID OR;  Service: Vascular;  Laterality: Right;    AORTOGRAM Right 6/20/2025    Procedure: right lower extremity arteriogram, shockwave lithotripsy, balloon angioplasty, mynx closue;  Surgeon: Blayne Zabala MD;  Location: [facility] HYBRID OR;  Service: Vascular;  Laterality: Right;    ARTERY SURGERY  2021    right side on neck    CAROTID ENDARTERECTOMY Right 05/10/2021    Procedure: RIGHT CAROTID ENDARTERECTOMY WITH EEG;  Surgeon: Gil Pineda DO;  Location: [facility] HYBRID OR 12;  Service: Vascular;  Laterality: Right;    COLONOSCOPY N/A 07/02/2020    Procedure: COLONOSCOPY WITH ANESTHESIA;  Surgeon: Adrien Brewster MD;  Location: [facility] ENDOSCOPY;  Service: Gastroenterology;  Laterality: N/A;  pre op: diarrhea  post op: polyps  PCP: Joe Velasco MD    COLONOSCOPY N/A 10/13/2020    Procedure: COLONOSCOPY WITH ANESTHESIA;  Surgeon: Adrien Brewster MD;  Location: [facility] ENDOSCOPY;  Service: Gastroenterology;  Laterality: N/A;  Pre: Chronic Diarrhea, Crohn's  Post: AVM  Dr. Neftali Velasco  CO2 Inflation Used    COLONOSCOPY N/A 12/08/2023    Procedure: COLONOSCOPY WITH ANESTHESIA;  Surgeon: Adrien Brewster MD;  Location: [facility] ENDOSCOPY;  Service: Gastroenterology;  Laterality: N/A;  pre chrone's disease  post sub optimal prep, polyp, chrone's      CORONARY ARTERY BYPASS GRAFT  2003    x3    ENDOSCOPY N/A 11/02/2021    Procedure: ESOPHAGOGASTRODUODENOSCOPY WITH ANESTHESIA;  Surgeon: Bridger Bell MD;  Location: [facility] ENDOSCOPY;  Service: Gastroenterology;  Laterality: N/A;  pre anemia;gi bleed  post  gi bleed;schatski ring  Dr. ERIC Velasco    ENDOSCOPY N/A 10/10/2023    Procedure: ESOPHAGOGASTRODUODENOSCOPY WITH ANESTHESIA;  Surgeon: Adrien Brewster MD;  Location: [facility] ENDOSCOPY;  Service: Gastroenterology;  Laterality: N/A;  preop; anemia  postop esophagitis ; R/O barretts   PCP Randall Beata    EYE SURGERY Bilateral     catorac     INCISION AND DRAINAGE PERIRECTAL ABSCESS N/A 2017    Procedure: INCISION AND DRAINAGE OF JEET ANAL ABSCESS;  Surgeon: Lynette Smith MD;  Location:  [facility];  Service:     INGUINAL HERNIA REPAIR Bilateral 2023    Procedure: INGUINAL HERNIA BILATERAL REPAIR LAPAROSCOPIC WITH DAVINCI ROBOT WITH MESH;  Surgeon: Tahira Rivera MD;  Location:  [facility] OR;  Service: Robotics - DaVinci;  Laterality: Bilateral;    INSERTION HEMODIALYSIS CATHETER N/A 2025    Procedure: HEMODIALYSIS CATHETER PLACEMENT;  Surgeon: Gil Pineda DO;  Location:  [facility]BRID [facility];  Service: Vascular;  Laterality: N/A;    MYRINGOTOMY W/ TUBES Left 2017    06/10/2016    TONSILLECTOMY      TOTAL HIP ARTHROPLASTY Right    ,   Family History   Problem Relation Age of Onset    Breast cancer Mother     Dementia Father     Glaucoma Father     No Known Problems Daughter     Colon polyps Neg Hx     Colon cancer Neg Hx    ,   Social History     Tobacco Use    Smoking status: Former     Current packs/day: 0.00     Average packs/day: 0.5 packs/day for 25.8 years (12.9 ttl pk-yrs)     Types: Cigarettes     Start date:      Quit date: 10/13/2013     Years since quittin.7     Passive exposure: Past    Smokeless tobacco: Never    Tobacco comments:     quit    Vaping Use    Vaping status: Former    Substances: Nicotine    Devices: Pre-filled or refillable cartridge   Substance Use Topics    Alcohol use: Not Currently    Drug use: No   ,     Medications    Prior to Admission medications    Medication Sig Start Date End Date Taking? Authorizing Provider   oxyCODONE-acetaminophen (PERCOCET) 5-325 MG per tablet Take 1 tablet by mouth Every 6 (Six) Hours As Needed for Moderate Pain for up to 24 doses. 7/3/25  Yes Garrett Comer MD   acetaminophen (TYLENOL) 325 MG tablet Take 2 tablets by mouth Every 4 (Four) Hours As Needed for Mild Pain. 25   Dc Issa DO   albuterol sulfate  (90 Base) MCG/ACT  inhaler Inhale 2 puffs Every 6 (Six) Hours As Needed for Wheezing or Shortness of Air.    Twyla Guevara MD   aspirin (aspirin) 81 MG EC tablet Take 1 tablet by mouth Daily.    Twyla Guevara MD   atorvastatin (LIPITOR) 10 MG tablet Take 1 tablet by mouth Daily. 9/4/24   Nathan Zimmer MD   bisacodyl (DULCOLAX) 10 MG suppository Insert 1 suppository into the rectum Daily As Needed for Constipation.    Twyla Guevara MD   Budeson-Glycopyrrol-Formoterol (Breztri Aerosphere) 160-9-4.8 MCG/ACT aerosol inhaler Inhale 2 puffs 2 (Two) Times a Day.    Twyla Guevara MD   calcitriol (ROCALTROL) 0.5 MCG capsule Take 1 capsule by mouth Daily. 1/28/25   Nathan Zimmer MD   carvedilol (COREG) 12.5 MG tablet Take 1 tablet by mouth 2 (Two) Times a Day With Meals. 6/4/25   Garrett Comer MD   clopidogrel (PLAVIX) 75 MG tablet Take 1 tablet by mouth Daily.    Twyla Guevara MD   desoximetasone (TOPICORT) 0.25 % cream Apply 1 Application topically to the appropriate area as directed 2 (Two) Times a Day As Needed for Irritation. irritation 6/4/25   Nathan Zimmer MD   docusate sodium (COLACE) 100 MG capsule Take 1 capsule by mouth 3 (Three) Times a Day As Needed for Constipation.    Twyla Guevara MD   empagliflozin (Jardiance) 10 MG tablet tablet Take 1 tablet by mouth Daily.    Twyla Guevara MD   esomeprazole (nexIUM) 20 MG capsule Take 1 capsule by mouth Every Morning Before Breakfast.    Twyla Guevara MD   fluticasone (FLONASE) 50 MCG/ACT nasal spray Administer 2 sprays into the nostril(s) as directed by provider Daily As Needed for Allergies.    Twyla Guevara MD   gabapentin (NEURONTIN) 300 MG capsule Take 1 capsule by mouth Every Night for 24 doses. 7/3/25 7/27/25  Garrett Comer MD   hydrALAZINE (APRESOLINE) 100 MG tablet Take 0.5 tablets by mouth 3 (Three) Times a Day. 7/2/25   Garrett Comer MD   iron polysaccharides (NIFEREX) 150 MG capsule  Take 1 capsule by mouth Daily. 6/4/25   Garrett Comer MD   isosorbide dinitrate (ISORDIL) 10 MG tablet Take 1 tablet by mouth 3 (Three) Times a Day. 1/28/25   Nathan Zimmer MD   levothyroxine (Synthroid) 100 MCG tablet Take 1 tablet by mouth Every Morning. 6/4/25   Nathan Zimmer MD   magnesium chloride ER 64 MG DR tablet Take 1 tablet by mouth Daily.    Twyla Guevara MD   montelukast (SINGULAIR) 10 MG tablet Take 1 tablet by mouth Every Night.    Twyla Guevara MD   Multiple Vitamins-Minerals (PRESERVISION/LUTEIN) capsule Take 1 capsule by mouth 2 (two) times a day.    Twyla Guevara MD   naloxone (NARCAN) 4 MG/0.1ML nasal spray Call 911. Don't prime. Jonancy in 1 nostril for overdose. Repeat in 2-3 minutes in other nostril if no or minimal breathing/responsiveness. 6/4/25   Garrett Comer MD   NIFEdipine XL (ADALAT CC) 60 MG 24 hr tablet Take 2 tablets by mouth Daily. 6/5/25   Garrett Comer MD   polyethylene glycol (MIRALAX) 17 g packet Take 17 g by mouth Daily As Needed (constipation).    Twyla Guevara MD   senna 8.6 MG tablet Take 2 tablets by mouth Daily As Needed for Constipation.    Twyla Guevara MD   tamsulosin (FLOMAX) 0.4 MG capsule 24 hr capsule Take 1 capsule by mouth Every Night.    Twyla Guevara MD   valsartan (DIOVAN) 320 MG tablet Take 1 tablet by mouth Daily. 6/5/25   Garrett Comer MD   vitamin D (ERGOCALCIFEROL) 1.25 MG (77998 UT) capsule capsule Take 1 capsule by mouth 1 (One) Time Per Week. Mondays    Twyla Guevara MD       Current Facility-Administered Medications   Medication Dose Route Frequency Provider Last Rate Last Admin    acetaminophen (TYLENOL) tablet 650 mg  650 mg Oral Q4H PRN Dc Issa DO        aspirin EC tablet 81 mg  81 mg Oral Daily Dc Issa DO   81 mg at 07/06/25 0848    atorvastatin (LIPITOR) tablet 10 mg  10 mg Oral Daily Dc Issa DO   10 mg at 07/06/25 0848    sennosides-docusate  (PERICOLACE) 8.6-50 MG per tablet 2 tablet  2 tablet Oral BID Dc Issa DO   2 tablet at 07/06/25 0848    And    polyethylene glycol (MIRALAX) packet 17 g  17 g Oral Daily PRN Dc Issa DO        And    bisacodyl (DULCOLAX) EC tablet 5 mg  5 mg Oral Daily PRN Dc Issa DO        And    bisacodyl (DULCOLAX) suppository 10 mg  10 mg Rectal Daily PRN Dc Issa DO        budesonide-formoterol (SYMBICORT) 160-4.5 MCG/ACT inhaler 2 puff  2 puff Inhalation BID - RT Dc Issa DO   2 puff at 07/05/25 2207    And    ipratropium (ATROVENT) nebulizer solution 0.5 mg  0.5 mg Nebulization 4x Daily - RT Dc Issa DO   0.5 mg at 07/06/25 1008    calcitriol (ROCALTROL) capsule 0.5 mcg  0.5 mcg Oral Daily Dc Issa DO   0.5 mcg at 07/06/25 0849    carvedilol (COREG) tablet 6.25 mg  6.25 mg Oral BID With Meals Dc Issa DO   6.25 mg at 07/06/25 0849    cefTRIAXone (ROCEPHIN) 2,000 mg in sodium chloride 0.9 % 100 mL MBP  2,000 mg Intravenous Q24H Dc Issa DO        clopidogrel (PLAVIX) tablet 75 mg  75 mg Oral Daily Dc Issa DO   75 mg at 07/06/25 0849    gabapentin (NEURONTIN) capsule 300 mg  300 mg Oral Nightly Dc Issa DO   300 mg at 07/05/25 2220    heparin (porcine) 5000 UNIT/ML injection 5,000 Units  5,000 Units Subcutaneous Q12H Dc Issa DO   5,000 Units at 07/06/25 0844    iron polysaccharides (NIFEREX) capsule 150 mg  150 mg Oral Daily Dc Issa DO   150 mg at 07/06/25 0848    isosorbide dinitrate (ISORDIL) tablet 10 mg  10 mg Oral TID - Nitrates Dc Issa DO   10 mg at 07/06/25 0848    levothyroxine (SYNTHROID, LEVOTHROID) tablet 100 mcg  100 mcg Oral Q AM Dc Issa DO   100 mcg at 07/06/25 0848    montelukast (SINGULAIR) tablet 10 mg  10 mg Oral Nightly Dc Issa DO   10 mg at 07/05/25 2220    oxyCODONE-acetaminophen (PERCOCET) 5-325 MG per tablet 1 tablet  1 tablet Oral Q6H PRN Dc Issa  "F, DO        pantoprazole (PROTONIX) EC tablet 40 mg  40 mg Oral Q AM Dc Issa, DO   40 mg at 07/06/25 0849    sodium chloride 0.9 % flush 10 mL  10 mL Intravenous Q12H Dc Issa    10 mL at 07/06/25 0846    sodium chloride 0.9 % flush 10 mL  10 mL Intravenous PRN Maegan, Dc GUARDADODO        sodium chloride 0.9 % infusion 40 mL  40 mL Intravenous PRN Dc Issa DEBBYDO           Allergies:  Ondansetron, Zofran [ondansetron hcl], Lortab [hydrocodone-acetaminophen], and Allopurinol    Review of Systems  Review of Systems   Constitutional:  Negative for chills, diaphoresis, fatigue and unexpected weight change.   HENT:  Negative for nosebleeds.    Respiratory:  Negative for cough, chest tightness, shortness of breath and wheezing.    Cardiovascular:  Negative for chest pain, palpitations and leg swelling.   Gastrointestinal:  Negative for anal bleeding, blood in stool, diarrhea and nausea.   Neurological:  Negative for dizziness, syncope and light-headedness.   All other systems reviewed and are negative.      Objective     Physical Exam:  Patient Vitals for the past 24 hrs:   BP Temp Temp src Pulse Resp SpO2 Height Weight   07/06/25 1014 -- -- -- 69 16 95 % -- --   07/06/25 1008 -- -- -- 69 16 95 % -- --   07/06/25 0804 147/64 98.4 °F (36.9 °C) Oral 85 18 95 % -- --   07/06/25 0547 -- -- -- 84 18 100 % -- --   07/06/25 0542 -- -- -- 84 18 98 % -- --   07/06/25 0455 157/56 98.3 °F (36.8 °C) Oral 84 18 96 % -- --   07/06/25 0134 -- 98.5 °F (36.9 °C) Oral -- -- -- -- --   07/05/25 2214 -- -- -- 74 -- -- -- --   07/05/25 2207 -- -- -- 76 16 96 % -- --   07/05/25 2123 146/42 98.4 °F (36.9 °C) Oral 88 16 96 % 182.9 cm (72\") 55 kg (121 lb 3.2 oz)   07/05/25 2057 -- -- -- 78 -- 98 % -- --   07/05/25 2046 122/48 -- -- 75 -- 98 % -- --   07/05/25 2027 -- 99.2 °F (37.3 °C) Oral -- -- -- -- --   07/05/25 1914 -- (!) 101.1 °F (38.4 °C) Axillary -- -- -- -- --   07/05/25 1821 -- -- -- -- -- -- 182.9 cm (72\") -- "   07/05/25 1812 -- (!) 102.4 °F (39.1 °C) Oral -- -- -- -- --   07/05/25 1647 -- -- -- -- -- -- -- 49.9 kg (110 lb)   07/05/25 1640 165/48 (!) 103 °F (39.4 °C) Oral 84 18 96 % -- --     Vitals reviewed.   Constitutional:       General: Not in acute distress.     Appearance: Healthy appearance. Underweight. Not diaphoretic.   Eyes:      General: No scleral icterus.     Conjunctiva/sclera: Conjunctivae normal.      Pupils: Pupils are equal, round, and reactive to light.   HENT:      Head: Normocephalic.    Mouth/Throat:      Pharynx: No oropharyngeal exudate.   Neck:      Vascular: No JVR.   Pulmonary:      Effort: Pulmonary effort is normal. No respiratory distress.      Breath sounds: Normal breath sounds. No wheezing. No rhonchi. No rales.   Chest:      Chest wall: Not tender to palpatation.   Cardiovascular:      Normal rate. Regular rhythm.      Murmurs: There is a grade 2/6 systolic murmur.   Pulses:     Intact distal pulses.   Edema:     Peripheral edema absent.      Comments: Right AKA  Abdominal:      General: Bowel sounds are normal. There is no distension.      Palpations: Abdomen is soft.      Tenderness: There is no abdominal tenderness.   Musculoskeletal: Normal range of motion.      Cervical back: Normal range of motion and neck supple. Skin:     General: Skin is warm and dry.      Coloration: Skin is not pale.      Findings: No erythema or rash.   Neurological:      Mental Status: Alert, oriented to person, place, and time and oriented to person, place and time.      Deep Tendon Reflexes: Reflexes are normal and symmetric.   Psychiatric:         Behavior: Behavior normal.         Results Review:   I reviewed the patient's new clinical results.    Lab Results (last 72 hours)       Procedure Component Value Units Date/Time    Comprehensive Metabolic Panel [[id number]]  (Abnormal) Collected: 07/06/25 0537    Specimen: Blood Updated: 07/06/25 0619     Glucose 94 mg/dL      BUN 33.9 mg/dL      Creatinine  2.47 mg/dL      Sodium 132 mmol/L      Potassium 3.7 mmol/L      Chloride 97 mmol/L      CO2 25.0 mmol/L      Calcium 8.5 mg/dL      Total Protein 5.6 g/dL      Albumin 2.9 g/dL      ALT (SGPT) 48 U/L      AST (SGOT) 51 U/L      Alkaline Phosphatase 312 U/L      Total Bilirubin 0.4 mg/dL      Globulin 2.7 gm/dL      A/G Ratio 1.1 g/dL      BUN/Creatinine Ratio 13.7     Anion Gap 10.0 mmol/L      eGFR 26.2 mL/min/1.73     Narrative:      GFR Categories in Chronic Kidney Disease (CKD)              GFR Category          GFR (mL/min/1.73)    Interpretation  G1                    90 or greater        Normal or high (1)  G2                    60-89                Mild decrease (1)  G3a                   45-59                Mild to moderate decrease  G3b                   30-44                Moderate to severe decrease  G4                    15-29                Severe decrease  G5                    14 or less           Kidney failure    (1)In the absence of evidence of kidney disease, neither GFR category G1 or G2 fulfill the criteria for CKD.    eGFR calculation 2021 CKD-EPI creatinine equation, which does not include race as a factor    Magnesium [[id number]]  (Normal) Collected: 07/06/25 0537    Specimen: Blood Updated: 07/06/25 0619     Magnesium 1.9 mg/dL     Phosphorus [[id number]]  (Abnormal) Collected: 07/06/25 0537    Specimen: Blood Updated: 07/06/25 0619     Phosphorus 2.4 mg/dL     CBC Auto Differential [[id number]]  (Abnormal) Collected: 07/06/25 0537    Specimen: Blood Updated: 07/06/25 0557     WBC 22.70 10*3/mm3      RBC 2.75 10*6/mm3      Hemoglobin 8.7 g/dL      Hematocrit 27.4 %      MCV 99.6 fL      MCH 31.6 pg      MCHC 31.8 g/dL      RDW 17.1 %      RDW-SD 59.7 fl      MPV 9.7 fL      Platelets 159 10*3/mm3      Neutrophil % 82.5 %      Lymphocyte % 5.5 %      Monocyte % 8.5 %      Eosinophil % 0.5 %      Basophil % 0.4 %      Immature Grans % 2.6 %      Neutrophils, Absolute 18.75 10*3/mm3       Lymphocytes, Absolute 1.24 10*3/mm3      Monocytes, Absolute 1.93 10*3/mm3      Eosinophils, Absolute 0.11 10*3/mm3      Basophils, Absolute 0.09 10*3/mm3      Immature Grans, Absolute 0.58 10*3/mm3      nRBC 0.0 /100 WBC     High Sensitivity Troponin T [[id number]]  (Abnormal) Collected: 07/05/25 2304    Specimen: Blood Updated: 07/05/25 2353     HS Troponin T 216 ng/L     Narrative:      High Sensitive Troponin T Reference Range:  <14.0 ng/L- Negative Female for AMI  <22.0 ng/L- Negative Male for AMI  >=14 - Abnormal Female indicating possible myocardial injury.  >=22 - Abnormal Male indicating possible myocardial injury.   Clinicians would have to utilize clinical acumen, EKG, Troponin, and serial changes to determine if it is an Acute Myocardial Infarction or myocardial injury due to an underlying chronic condition.         High Sensitivity Troponin T 1Hr [[id number]]  (Abnormal) Collected: 07/05/25 1946    Specimen: Blood from Arm, Left Updated: 07/05/25 2015     HS Troponin T 176 ng/L      Troponin T Numeric Delta 29 ng/L      Troponin T % Delta 20    Narrative:      High Sensitive Troponin T Reference Range:  <14.0 ng/L- Negative Female for AMI  <22.0 ng/L- Negative Male for AMI  >=14 - Abnormal Female indicating possible myocardial injury.  >=22 - Abnormal Male indicating possible myocardial injury.   Clinicians would have to utilize clinical acumen, EKG, Troponin, and serial changes to determine if it is an Acute Myocardial Infarction or myocardial injury due to an underlying chronic condition.         High Sensitivity Troponin T [[id number]]  (Abnormal) Collected: 07/05/25 1756    Specimen: Blood from Arm, Left Updated: 07/05/25 1937     HS Troponin T 147 ng/L     Narrative:      High Sensitive Troponin T Reference Range:  <14.0 ng/L- Negative Female for AMI  <22.0 ng/L- Negative Male for AMI  >=14 - Abnormal Female indicating possible myocardial injury.  >=22 - Abnormal Male indicating possible myocardial  injury.   Clinicians would have to utilize clinical acumen, EKG, Troponin, and serial changes to determine if it is an Acute Myocardial Infarction or myocardial injury due to an underlying chronic condition.         Comprehensive Metabolic Panel [[id number]]  (Abnormal) Collected: 07/05/25 1756    Specimen: Blood from Arm, Left Updated: 07/05/25 1933     Glucose 103 mg/dL      BUN 29.6 mg/dL      Creatinine 2.02 mg/dL      Sodium 135 mmol/L      Potassium 3.7 mmol/L      Chloride 99 mmol/L      CO2 24.0 mmol/L      Calcium 8.4 mg/dL      Total Protein 5.4 g/dL      Albumin 2.8 g/dL      ALT (SGPT) 39 U/L      AST (SGOT) 46 U/L      Alkaline Phosphatase 322 U/L      Total Bilirubin 0.3 mg/dL      Globulin 2.6 gm/dL      A/G Ratio 1.1 g/dL      BUN/Creatinine Ratio 14.7     Anion Gap 12.0 mmol/L      eGFR 33.3 mL/min/1.73     Narrative:      GFR Categories in Chronic Kidney Disease (CKD)              GFR Category          GFR (mL/min/1.73)    Interpretation  G1                    90 or greater        Normal or high (1)  G2                    60-89                Mild decrease (1)  G3a                   45-59                Mild to moderate decrease  G3b                   30-44                Moderate to severe decrease  G4                    15-29                Severe decrease  G5                    14 or less           Kidney failure    (1)In the absence of evidence of kidney disease, neither GFR category G1 or G2 fulfill the criteria for CKD.    eGFR calculation 2021 CKD-EPI creatinine equation, which does not include race as a factor    Blood Culture - Blood, Arm, Left [[id number]] Collected: 07/05/25 1731    Specimen: Blood from Arm, Left Updated: 07/05/25 1921    Urinalysis With Culture If Indicated - Straight Cath [[id number]]  (Abnormal) Collected: 07/05/25 1841    Specimen: Urine from Straight Cath Updated: 07/05/25 1913     Color, UA Yellow     Appearance, UA Turbid     pH, UA 7.5     Specific Gravity, UA 1.014      Glucose,  mg/dL (Trace)     Ketones, UA Negative     Bilirubin, UA Negative     Blood, UA Large (3+)     Protein, UA >=300 mg/dL (3+)     Leuk Esterase, UA Large (3+)     Nitrite, UA Positive     Urobilinogen, UA 1.0 E.U./dL    Narrative:      In absence of clinical symptoms, the presence of pyuria, bacteria, and/or nitrites on the urinalysis result does not correlate with infection.    Urinalysis, Microscopic Only - Straight Cath [[id number]]  (Abnormal) Collected: 07/05/25 1841    Specimen: Urine from Straight Cath Updated: 07/05/25 1913     RBC, UA Too Numerous to Count /HPF      WBC, UA Too Numerous to Count /HPF      Bacteria, UA 4+ /HPF      Squamous Epithelial Cells, UA None Seen /HPF      Hyaline Casts, UA None Seen /LPF      Methodology Manual Light Microscopy    Urine Culture - Urine, Straight Cath [[id number]] Collected: 07/05/25 1841    Specimen: Urine from Straight Cath Updated: 07/05/25 1913    Respiratory Panel PCR w/COVID-19(SARS-CoV-2) TOSIN/JOVAN/ANTONIO/PAD/COR/CHARLENE In-House, NP Swab in UTM/VTM, 2 HR TAT - Swab, Nasopharynx [[id number]]  (Normal) Collected: 07/05/25 1725    Specimen: Swab from Nasopharynx Updated: 07/05/25 1829     ADENOVIRUS, PCR Not Detected     Coronavirus 229E Not Detected     Coronavirus HKU1 Not Detected     Coronavirus NL63 Not Detected     Coronavirus OC43 Not Detected     COVID19 Not Detected     Human Metapneumovirus Not Detected     Human Rhinovirus/Enterovirus Not Detected     Influenza A PCR Not Detected     Influenza B PCR Not Detected     Parainfluenza Virus 1 Not Detected     Parainfluenza Virus 2 Not Detected     Parainfluenza Virus 3 Not Detected     Parainfluenza Virus 4 Not Detected     RSV, PCR Not Detected     Bordetella pertussis pcr Not Detected     Bordetella parapertussis PCR Not Detected     Chlamydophila pneumoniae PCR Not Detected     Mycoplasma pneumo by PCR Not Detected    Narrative:      In the setting of a positive respiratory panel with a viral  infection PLUS a negative procalcitonin without other underlying concern for bacterial infection, consider observing off antibiotics or discontinuation of antibiotics and continue supportive care. If the respiratory panel is positive for atypical bacterial infection (Bordetella pertussis, Chlamydophila pneumoniae, or Mycoplasma pneumoniae), consider antibiotic de-escalation to target atypical bacterial infection.    Lactic Acid, Plasma [[id number]]  (Normal) Collected: 07/05/25 1758    Specimen: Blood from Arm, Left Updated: 07/05/25 1825     Lactate 2.0 mmol/L     CBC & Differential [[id number]]  (Abnormal) Collected: 07/05/25 1756    Specimen: Blood from Arm, Left Updated: 07/05/25 1811    Narrative:      The following orders were created for panel order CBC & Differential.  Procedure                               Abnormality         Status                     ---------                               -----------         ------                     CBC Auto Differential[[id number]]        Abnormal            Final result                 Please view results for these tests on the individual orders.    CBC Auto Differential [[id number]]  (Abnormal) Collected: 07/05/25 1756    Specimen: Blood from Arm, Left Updated: 07/05/25 1811     WBC 20.93 10*3/mm3      RBC 2.40 10*6/mm3      Hemoglobin 7.7 g/dL      Hematocrit 23.4 %      MCV 97.5 fL      MCH 32.1 pg      MCHC 32.9 g/dL      RDW 16.2 %      RDW-SD 56.2 fl      MPV 9.7 fL      Platelets 170 10*3/mm3      Neutrophil % 85.8 %      Lymphocyte % 3.7 %      Monocyte % 7.6 %      Eosinophil % 0.9 %      Basophil % 0.3 %      Immature Grans % 1.7 %      Neutrophils, Absolute 17.95 10*3/mm3      Lymphocytes, Absolute 0.77 10*3/mm3      Monocytes, Absolute 1.60 10*3/mm3      Eosinophils, Absolute 0.18 10*3/mm3      Basophils, Absolute 0.07 10*3/mm3      Immature Grans, Absolute 0.36 10*3/mm3      nRBC 0.0 /100 WBC     Blood Culture - Blood, Arm, Left [[id number]] Collected:  "07/05/25 1731    Specimen: Blood from Arm, Left Updated: 07/05/25 1739            No results found for: \"ECHOEFEST\"    Imaging Results (Last 72 Hours)       Procedure Component Value Units Date/Time    XR Chest 1 View [[id number]] Collected: 07/05/25 1722     Updated: 07/05/25 1729    Narrative:      EXAMINATION: XR CHEST 1 VW- 7/5/2025 5:23 PM     HISTORY: Fever.     REPORT: Frontal view of the chest was obtained.     COMPARISON: Chest x-ray 6/19/2025.     There is interval improvement, near resolution of interstitial  infiltrates in the right lung, no lung consolidation or focal infiltrate  is identified. The left lung remains clear. The right internal jugular  central venous catheter appears in good position as before. Previous  median sternotomy and CABG is noted. Heart size appears normal, no  evidence of overt CHF is identified. The osseous structures are acutely  unremarkable. Mixed sclerotic/lytic changes in the right humeral head  appear similar, may be associated with osteonecrosis.       Impression:      Partial resolution of interstitial infiltrates in the right  lung, mild residual right-sided interstitial pneumonitis or mild  asymmetric pulmonary edema may be present. No lung consolidation or  acute focal infiltrate. Previous CABG. The right internal jugular  central venous catheter remains in good position.     This report was signed and finalized on 7/5/2025 5:26 PM by Dr. Ash Seay MD.               Assessment & Plan       Sepsis    Essential hypertension    Peripheral arterial disease    IHD (ischemic heart disease)    Anemia due to chronic kidney disease    Chronic diastolic (congestive) heart failure    ESRD (end stage renal disease) on dialysis    Elevated troponin    Coronary artery disease involving native coronary artery of native heart without angina pectoris      Plan:     CAD/elevated troponin: denies ischemic symptoms however has an elevated troponin and worsening ST depression in " lateral leads on EKG. Elevated troponin felt to be secondary to comorbidities (ESRD, vascular disease, anemia) and UTI. Plan for ischemic eval outpatient. Continue ASA, Plavix, lipitor, coreg, and isordil     Chronic diastolic CHF: euvolemic on exam. Volume management per dialysis.     ESRD: treatment on M/W/F    Sepsis: UA 4+ bacteria. Treatment per attending.     Further orders per Dr. Raines    Thank you for asking us to follow this patient with you.     Electronically signed by STERLING Brown, 07/06/25, 9:05 AM CDT.    Please note this cardiology consultation note is the result of a face to face consultation with the patient, in addition to reviewing medical records at length by myself, STERLING Holloway      Time spent: 50 minutes

## 2025-07-06 NOTE — CONSULTS
Nephrology ([facility]) Consult Note      Patient:  Ricardo Hugo  YOB: 1950  Date of Service: 7/6/2025  MRN: [id number]   Acct: [id number]   Primary Care Physician: Nathan Zimmer MD  Advance Directive:   Code Status and Medical Interventions: CPR (Attempt to Resuscitate); Full Support   Ordered at: 07/05/25 2124     Code Status (Patient has no pulse and is not breathing):    CPR (Attempt to Resuscitate)     Medical Interventions (Patient has pulse or is breathing):    Full Support     Level Of Support Discussed With:    Patient     Admit Date: 7/5/2025       Hospital Day: 1  Referring Provider: No Known Provider      Patient Seen, Chart, Consults, Notes, Labs, Radiology studies reviewed.    Chief complaint: Confusion/weakness.    Subjective:  Ricardo Hugo is a 75 y.o. male  whom we were consulted for end-stage renal disease on maintenance dialysis.  Patient has history of coronary artery disease/CABG, diastolic CHF, ESRD, anemia of chronic kidney disease, recent right AKA for ischemic leg.  He was just discharged from [facility] to nursing home for rehabilitation.  He went for dialysis on July 5.  During dialysis he was found to be confused/disoriented and was found to have fever 105.  Patient was directed to go to emergency room at [facility].  On arrival he remained confused with stable blood pressure.  Patient denies any cough, abdominal pain or dysuria.  Now admitted for fever workup.    This morning he remained confused/disoriented, denies any shortness of breath.  He is fairly malnourished and has been losing weight.    Allergies:  Ondansetron, Zofran [ondansetron hcl], Lortab [hydrocodone-acetaminophen], and Allopurinol    Home Meds:  Medications Prior to Admission   Medication Sig Dispense Refill Last Dose/Taking    aspirin 81 MG chewable tablet Chew 1 tablet Daily.   Taking    atorvastatin (LIPITOR) 10 MG tablet Take 1 tablet by  mouth Daily. 90 tablet 3 Taking    Budeson-Glycopyrrol-Formoterol (Breztri Aerosphere) 160-9-4.8 MCG/ACT aerosol inhaler Inhale 2 puffs 2 (Two) Times a Day.   Taking    calcitriol (ROCALTROL) 0.5 MCG capsule Take 1 capsule by mouth Daily. 90 capsule 2 Taking    carvedilol (COREG) 12.5 MG tablet Take 1 tablet by mouth 2 (Two) Times a Day With Meals.   Taking    clopidogrel (PLAVIX) 75 MG tablet Take 1 tablet by mouth Daily.   Taking    empagliflozin (Jardiance) 10 MG tablet tablet Take 1 tablet by mouth Daily.   Taking    esomeprazole (nexIUM) 20 MG capsule Take 1 capsule by mouth Every Morning Before Breakfast.   Taking    gabapentin (NEURONTIN) 300 MG capsule Take 1 capsule by mouth Every Night for 24 doses.   Taking    hydrALAZINE (APRESOLINE) 100 MG tablet Take 0.5 tablets by mouth 3 (Three) Times a Day.   Taking    iron polysaccharides (NIFEREX) 150 MG capsule Take 1 capsule by mouth Daily.   Taking    isosorbide dinitrate (ISORDIL) 10 MG tablet Take 1 tablet by mouth 3 (Three) Times a Day. 270 tablet 2 Taking    levothyroxine (Synthroid) 100 MCG tablet Take 1 tablet by mouth Every Morning. 90 tablet 2 Taking    magnesium chloride-calcium (SLOW MAGNESIUM/CALCIUM)  MG tablet delayed-release ec tablet Take 1 tablet by mouth Daily.   Taking    montelukast (SINGULAIR) 10 MG tablet Take 1 tablet by mouth Every Night.   Taking    Multiple Vitamins-Minerals (PRESERVISION/LUTEIN) capsule Take 1 capsule by mouth 2 (two) times a day.   Taking    NIFEdipine XL (ADALAT CC) 60 MG 24 hr tablet Take 2 tablets by mouth Daily.   Taking    oxyCODONE-acetaminophen (PERCOCET) 5-325 MG per tablet Take 1 tablet by mouth Every 6 (Six) Hours As Needed for Moderate Pain for up to 24 doses. 24 tablet 0 7/5/2025    polyethylene glycol (MIRALAX) 17 g packet Take 17 g by mouth Daily. For constipation   Taking    tamsulosin (FLOMAX) 0.4 MG capsule 24 hr capsule Take 1 capsule by mouth Every Night.   Taking    valsartan (DIOVAN) 320 MG  tablet Take 1 tablet by mouth Daily.   Taking    vitamin D (ERGOCALCIFEROL) 1.25 MG (95833 UT) capsule capsule Take 1 capsule by mouth 1 (One) Time Per Week. Mondays   Taking    acetaminophen (TYLENOL) 325 MG tablet Take 2 tablets by mouth Every 4 (Four) Hours As Needed for Mild Pain.   Unknown    albuterol sulfate  (90 Base) MCG/ACT inhaler Inhale 2 puffs Every 6 (Six) Hours As Needed for Wheezing or Shortness of Air.   Unknown    bisacodyl (DULCOLAX) 10 MG suppository Insert 1 suppository into the rectum Daily As Needed for Constipation.   Unknown    desoximetasone (TOPICORT) 0.25 % cream Apply 1 Application topically to the appropriate area as directed 2 (Two) Times a Day As Needed for Irritation. irritation 60 g 0     docusate sodium (COLACE) 100 MG capsule Take 1 capsule by mouth 3 (Three) Times a Day As Needed for Constipation.   Unknown    fluticasone (FLONASE) 50 MCG/ACT nasal spray Administer 2 sprays into the nostril(s) as directed by provider Daily As Needed for Allergies.   Unknown    naloxone (NARCAN) 4 MG/0.1ML nasal spray Call 911. Don't prime. Mozelle in 1 nostril for overdose. Repeat in 2-3 minutes in other nostril if no or minimal breathing/responsiveness. 2 each 0 Unknown    senna 8.6 MG tablet Take 2 tablets by mouth Daily As Needed for Constipation.   Unknown       Medicines:  Current Facility-Administered Medications   Medication Dose Route Frequency Provider Last Rate Last Admin    acetaminophen (TYLENOL) tablet 650 mg  650 mg Oral Q4H PRN Dc Issa DO        albuterol (ACCUNEB) nebulizer solution 0.63 mg  0.63 mg Nebulization Q6H PRN Vidal Miramontes MD        aspirin EC tablet 81 mg  81 mg Oral Daily Dc Issa DO   81 mg at 07/06/25 0848    atorvastatin (LIPITOR) tablet 10 mg  10 mg Oral Daily Dc Issa DO   10 mg at 07/06/25 0848    sennosides-docusate (PERICOLACE) 8.6-50 MG per tablet 2 tablet  2 tablet Oral BID Dc Issa DO   2 tablet at 07/06/25 0848     And    polyethylene glycol (MIRALAX) packet 17 g  17 g Oral Daily PRN Dc Issa DO        And    bisacodyl (DULCOLAX) EC tablet 5 mg  5 mg Oral Daily PRN Dc Issa DO        And    bisacodyl (DULCOLAX) suppository 10 mg  10 mg Rectal Daily PRN Dc Issa DO        budesonide-formoterol (SYMBICORT) 160-4.5 MCG/ACT inhaler 2 puff  2 puff Inhalation BID - RT Dc Issa DO   2 puff at 07/05/25 2207    And    ipratropium (ATROVENT) nebulizer solution 0.5 mg  0.5 mg Nebulization 4x Daily - RT Dc Issa DO   0.5 mg at 07/06/25 1346    calcitriol (ROCALTROL) capsule 0.5 mcg  0.5 mcg Oral Daily Dc Issa DO   0.5 mcg at 07/06/25 0849    carvedilol (COREG) tablet 6.25 mg  6.25 mg Oral BID With Meals Dc Issa DO   6.25 mg at 07/06/25 0849    cefTRIAXone (ROCEPHIN) 2,000 mg in sodium chloride 0.9 % 100 mL MBP  2,000 mg Intravenous Q24H Dc Issa DO        clopidogrel (PLAVIX) tablet 75 mg  75 mg Oral Daily Dc Issa DO   75 mg at 07/06/25 0849    empagliflozin (JARDIANCE) tablet 10 mg  10 mg Oral Daily Vidal Miramontes MD   10 mg at 07/06/25 1225    fluticasone (FLONASE) 50 MCG/ACT nasal spray 2 spray  2 spray Nasal Daily PRN Vidal Miramontes MD        gabapentin (NEURONTIN) capsule 300 mg  300 mg Oral Nightly Dc Issa DO   300 mg at 07/05/25 2220    heparin (porcine) 5000 UNIT/ML injection 5,000 Units  5,000 Units Subcutaneous Q12H Dc Issa DO   5,000 Units at 07/06/25 0844    hydrALAZINE (APRESOLINE) tablet 50 mg  50 mg Oral TID Vidal Miramontes MD        iron polysaccharides (NIFEREX) capsule 150 mg  150 mg Oral Daily Dc Issa DO   150 mg at 07/06/25 0848    isosorbide dinitrate (ISORDIL) tablet 10 mg  10 mg Oral TID - Nitrates Dc Issa DO   10 mg at 07/06/25 1225    levothyroxine (SYNTHROID, LEVOTHROID) tablet 100 mcg  100 mcg Oral Q AM Dc Issa DO   100 mcg at 07/06/25 0848    montelukast (SINGULAIR) tablet  10 mg  10 mg Oral Nightly Dc Issa DO   10 mg at 07/05/25 2220    NIFEdipine XL (PROCARDIA XL) 24 hr tablet 120 mg  120 mg Oral Daily Vidal Miramontes MD   120 mg at 07/06/25 1225    oxyCODONE-acetaminophen (PERCOCET) 5-325 MG per tablet 1 tablet  1 tablet Oral Q6H PRN Dc Issa DO        pantoprazole (PROTONIX) EC tablet 40 mg  40 mg Oral Q AM Dc Issa DO   40 mg at 07/06/25 0849    sodium chloride 0.9 % flush 10 mL  10 mL Intravenous Q12H Dc Issa DO   10 mL at 07/06/25 0846    sodium chloride 0.9 % flush 10 mL  10 mL Intravenous PRN Dc Issa DO        sodium chloride 0.9 % infusion 40 mL  40 mL Intravenous PRN Dc Issa DO        tamsulosin (FLOMAX) 24 hr capsule 0.4 mg  0.4 mg Oral Nightly Vidal Miramontes MD        valsartan (DIOVAN) tablet 320 mg  320 mg Oral Daily Vidal Miramontes MD           Past Medical History:  Past Medical History:   Diagnosis Date    3-vessel CAD 08/11/2020    Allergic rhinitis     Anemia     Anxiety disorder 04/27/2020    Arthritis     Asymmetrical sensorineural hearing loss 06/28/2017    Atherosclerosis of native artery of both lower extremities with intermittent claudication 07/18/2019    Avascular necrosis of femoral head, left 07/11/2020    right hip after surgery    Carotid stenosis     Chronic mucoid otitis media     Chronic rhinitis     COPD (chronic obstructive pulmonary disease)     Coronary artery disease     HEART BYPASS 2004    Crohn's disease of large intestine with other complication 07/30/2020    Chronic diarrhea Colonoscopy July 2020 revealed mild patchy scattered hemosiderin staining with inflammation more so in rectosigmoid area.  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    Iron deficiency anemia 07/14/2020    Mixed hearing loss of left ear     PAD (peripheral artery disease) 08/11/2020    Perianal abscess     Pernicious anemia 08/17/2020    took shots but never diagnosed with b12 deficiency    Personal history of alcoholism 08/11/2020    quit drinking in 2013    Prostatic hypertrophy 08/11/2020    Sensorineural hearing loss     Sepsis with acute renal failure 09/15/2020    Shortness of breath 05/27/2021    Tinnitus     Ventricular tachycardia, nonsustained 07/14/2020    Weight loss 07/11/2020       Past Surgical History:  Past Surgical History:   Procedure Laterality Date    ABOVE KNEE AMPUTATION Right 6/24/2025    Procedure: right above knee amputation;  Surgeon: Blayne Zabala MD;  Location:  [facility] OR;  Service: Vascular;  Laterality: Right;    AORTOGRAM Right 4/25/2025    Procedure: RIGHT LOWER EXTREMITY ANGIOGRAM, SHOCKWAVE LITHOTRIPSY, BALLOON ANGIOPLASTY, MYNX CLOSURE;  Surgeon: Gil Pineda DO;  Location:  [facility] HYBRID OR;  Service: Vascular;  Laterality: Right;    AORTOGRAM Left 5/22/2025    Procedure: LEFT LOWER EXTREMITY ANGIOGRAM, SHOCKWAVE LITHOTRIPSY, BALLOON ANGIOPLASTY, STENT PLACEMENT, MYNX CLOSURE;  Surgeon: Blayne Zabala MD;  Location:  [facility] HYBRID OR;  Service: Vascular;  Laterality: Left;    AORTOGRAM Right 5/30/2025    Procedure: AORTOILIAC ANGIOGRAM WITH LEFT LOWER EXTREMITY ANGIOGRAM;  Surgeon: Gil Pineda DO;  Location:  [facility] HYBRID OR;  Service: Vascular;  Laterality: Right;    AORTOGRAM Right 6/20/2025    Procedure: right lower extremity arteriogram, shockwave lithotripsy, balloon angioplasty, mynx closue;  Surgeon: Blayne Zabala MD;  Location:  [facility] HYBRID OR;  Service: Vascular;  Laterality: Right;    ARTERY SURGERY  2021    right side on neck    CAROTID ENDARTERECTOMY Right 05/10/2021    Procedure: RIGHT CAROTID  ENDARTERECTOMY WITH EEG;  Surgeon: Gil Pineda DO;  Location: [facility] HYBRID OR 12;  Service: Vascular;  Laterality: Right;    COLONOSCOPY N/A 07/02/2020    Procedure: COLONOSCOPY WITH ANESTHESIA;  Surgeon: Adrien Brewster MD;  Location: [facility] ENDOSCOPY;  Service: Gastroenterology;  Laterality: N/A;  pre op: diarrhea  post op: polyps  PCP: Joe Velasco MD    COLONOSCOPY N/A 10/13/2020    Procedure: COLONOSCOPY WITH ANESTHESIA;  Surgeon: Adrien Brewster MD;  Location: [facility] ENDOSCOPY;  Service: Gastroenterology;  Laterality: N/A;  Pre: Chronic Diarrhea, Crohn's  Post: AVM  Dr. Neftali Velasco  CO2 Inflation Used    COLONOSCOPY N/A 12/08/2023    Procedure: COLONOSCOPY WITH ANESTHESIA;  Surgeon: Adrien Brewster MD;  Location: [facility] ENDOSCOPY;  Service: Gastroenterology;  Laterality: N/A;  pre chrone's disease  post sub optimal prep, polyp, chrone's      CORONARY ARTERY BYPASS GRAFT  2003    x3    ENDOSCOPY N/A 11/02/2021    Procedure: ESOPHAGOGASTRODUODENOSCOPY WITH ANESTHESIA;  Surgeon: Bridger Bell MD;  Location: [facility] ENDOSCOPY;  Service: Gastroenterology;  Laterality: N/A;  pre anemia;gi bleed  post  gi bleed;schatski ring  Dr. ERIC Velasco    ENDOSCOPY N/A 10/10/2023    Procedure: ESOPHAGOGASTRODUODENOSCOPY WITH ANESTHESIA;  Surgeon: Adrien Brewster MD;  Location: [facility] ENDOSCOPY;  Service: Gastroenterology;  Laterality: N/A;  preop; anemia  postop esophagitis ; R/O barretts   PCP Randall Beata    EYE SURGERY Bilateral     catorac    INCISION AND DRAINAGE PERIRECTAL ABSCESS N/A 03/03/2017    Procedure: INCISION AND DRAINAGE OF JEET ANAL ABSCESS;  Surgeon: Lynette Smith MD;  Location: [facility] OR;  Service:     INGUINAL HERNIA REPAIR Bilateral 06/27/2023    Procedure: INGUINAL HERNIA BILATERAL REPAIR LAPAROSCOPIC WITH DAVINCI ROBOT WITH MESH;  Surgeon: Tahira Rivera MD;  Location: [facility] OR;  Service: Robotics - DaVinci;  Laterality: Bilateral;    INSERTION HEMODIALYSIS  "CATHETER N/A 2025    Procedure: HEMODIALYSIS CATHETER PLACEMENT;  Surgeon: Gil Pineda DO;  Location:  [facility] HYBRID OR;  Service: Vascular;  Laterality: N/A;    MYRINGOTOMY W/ TUBES Left 2017    06/10/2016    TONSILLECTOMY      TOTAL HIP ARTHROPLASTY Right        Family History  Family History   Problem Relation Age of Onset    Breast cancer Mother     Dementia Father     Glaucoma Father     No Known Problems Daughter     Colon polyps Neg Hx     Colon cancer Neg Hx        Social History  Social History     Socioeconomic History    Marital status:    Tobacco Use    Smoking status: Former     Current packs/day: 0.00     Average packs/day: 0.5 packs/day for 25.8 years (12.9 ttl pk-yrs)     Types: Cigarettes     Start date:      Quit date: 10/13/2013     Years since quittin.7     Passive exposure: Past    Smokeless tobacco: Never    Tobacco comments:     quit    Vaping Use    Vaping status: Former    Substances: Nicotine    Devices: Pre-filled or refillable cartridge   Substance and Sexual Activity    Alcohol use: Not Currently    Drug use: No    Sexual activity: Not Currently     Partners: Female         Review of Systems:  History obtained from chart review and the patient  General ROS: No fever or chills  Respiratory ROS: No cough, shortness of breath, wheezing  Cardiovascular ROS: no chest pain or dyspnea on exertion  Gastrointestinal ROS: No abdominal pain or melena  Genito-Urinary ROS: No dysuria or hematuria  14 point ROS reviewed with the patient and negative except as noted above and in the HPI unless unable to obtain.    Objective:  /40 (BP Location: Left arm, Patient Position: Lying)   Pulse 72   Temp 99.1 °F (37.3 °C) (Oral)   Resp 18   Ht 182.9 cm (72\")   Wt 55 kg (121 lb 3.2 oz)   SpO2 99%   BMI 16.44 kg/m²     Intake/Output Summary (Last 24 hours) at 2025 1438  Last data filed at 2025 0200  Gross per 24 hour   Intake 100 ml   Output 150 ml "   Net -50 ml     General: Sleepy/confused  HEENT: Normocephalic atraumatic head/bitemporal wasting  Neck: Supple with no JVD or carotid bruits.  Chest:  clear to auscultation bilaterally without respiratory distress  CVS: regular rate and rhythm  Abdominal: soft, nontender, normal bowel sounds  Extremities: Right AKA.  Skin: warm and dry without rash      Labs:    Results from last 7 days   Lab Units 07/06/25  0537 07/05/25 1756 07/03/25  0359   WBC 10*3/mm3 22.70* 20.93* 8.58   HEMOGLOBIN g/dL 8.7* 7.7* 8.2*   HEMATOCRIT % 27.4* 23.4* 25.4*   PLATELETS 10*3/mm3 159 170 160       Results from last 7 days   Lab Units 07/06/25 0537 07/05/25 1756 07/03/25  0359   SODIUM mmol/L 132* 135* 134*   POTASSIUM mmol/L 3.7 3.7 3.6   CHLORIDE mmol/L 97* 99 101   CO2 mmol/L 25.0 24.0 24.0   BUN mg/dL 33.9* 29.6* 23.0   CREATININE mg/dL 2.47* 2.02* 1.82*   CALCIUM mg/dL 8.5* 8.4* 8.6   BILIRUBIN mg/dL 0.4 0.3 0.4   ALK PHOS U/L 312* 322* 253*   ALT (SGPT) U/L 48* 39 32   AST (SGOT) U/L 51* 46* 40   GLUCOSE mg/dL 94 103* 93   EGFR mL/min/1.73 26.2* 33.3* 37.8*         Radiology:   Imaging Results (Last 24 Hours)       Procedure Component Value Units Date/Time    XR Chest 1 View [[id number]] Collected: 07/05/25 1722     Updated: 07/05/25 1729    Narrative:      EXAMINATION: XR CHEST 1 VW- 7/5/2025 5:23 PM     HISTORY: Fever.     REPORT: Frontal view of the chest was obtained.     COMPARISON: Chest x-ray 6/19/2025.     There is interval improvement, near resolution of interstitial  infiltrates in the right lung, no lung consolidation or focal infiltrate  is identified. The left lung remains clear. The right internal jugular  central venous catheter appears in good position as before. Previous  median sternotomy and CABG is noted. Heart size appears normal, no  evidence of overt CHF is identified. The osseous structures are acutely  unremarkable. Mixed sclerotic/lytic changes in the right humeral head  appear similar, may be  "associated with osteonecrosis.       Impression:      Partial resolution of interstitial infiltrates in the right  lung, mild residual right-sided interstitial pneumonitis or mild  asymmetric pulmonary edema may be present. No lung consolidation or  acute focal infiltrate. Previous CABG. The right internal jugular  central venous catheter remains in good position.     This report was signed and finalized on 7/5/2025 5:26 PM by Dr. Ash Seay MD.               Culture:  No components found for: \"WOUNDCUL\", \"3\"  No components found for: \"CSFCUL\", \"3\"  No components found for: \"BC\", \"3\"  No components found for: \"URINECUL\", \"3\"      Assessment   1.  End-stage renal disease on maintenance dialysis.  2.  Hypertensive renal disease.  3.  Febrile illness possible bloodstream infection  4.  Recent history of right AKA.  5.  Anemia of chronic kidney disease.  6.  Failure to thrive.    Plan:  1.  Possible cause of underlying febrile illness could be infected vascular access.  However he has pending blood cultures, meanwhile continue IV antibiotics and continue to follow blood cultures.  2.  Routine hemodialysis treatment tomorrow.  3.  Resume ABILIO for the treatment of anemia of chronic kidney disease.  4.  Plan was discussed with registered nurse.      Thank you for the consult, we appreciate the opportunity to provide care to your patients.  Feel free to contact me if I can be of any further assistance.      Giuliano Saldana MD  7/6/2025  14:38 CDT  "

## 2025-07-06 NOTE — PROGRESS NOTES
[facility] Medicine Services  INPATIENT PROGRESS NOTE    Patient Name: Ricardo Hugo  Date of Admission: 7/5/2025  Today's Date: 07/06/25  Length of Stay: 1  Primary Care Physician: Nathan Zimmer MD    Subjective   Patient is a 75-year-old vasculopath with known CAD post CABG in early 2000's (? 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  Currently patient is at a skilled nursing facility after recent surgery.  Patient typically gets dialysis on a Monday Wednesday and Friday.  Apparently had missed dialysis yesterday due to transport error.  Patient went for dialysis today and then after dialysis was noted to be confused with a temperature of 105.  Due to that he was sent to our ER.  Here in the ER he has a fever of 103 which is come down with Tylenol.  He has a white count of 21.  Blood pressure is stable.  Urinalysis concerning for possible infection.  I saw him in ER 40 with family at bedside.  He was asleep but easily awoken.  He is interactive.  He is mildly confused but appears to be answering questions appropriately.  He knew where he was but did not know the month or the year.  He says he feels okay at this time.  Denies any cough or phlegm.  Denies any chest pain or shortness of breath.  No abdominal pain or nausea.  No new focal numbness or weakness.  Urine and blood cultures have been sent from the ER.  He has been given a dose of ceftriaxone.  We have been asked admit for further care   7/6  As before history of end-stage renal disease on dialysis history of peripheral vascular disease coronary artery disease that is post CABG presented with sepsis confusion felt secondary to metabolic encephalopathy was found to have urosepsis blood culture is pending so far noted on Rocephin cardiac enzymes were elevated cardiology has been consulted his last  "echo on January 2025 was showing EF to be 51%    Review of Systems   All other systems reviewed and are negative.     All pertinent negatives and positives are as above. All other systems have been reviewed and are negative unless otherwise stated.     Objective    Temp:  [98.3 °F (36.8 °C)-103 °F (39.4 °C)] 98.4 °F (36.9 °C)  Heart Rate:  [69-88] 69  Resp:  [16-18] 16  BP: (122-165)/(42-64) 147/64  Physical Exam  HENT:      Head: Normocephalic.      Nose: Nose normal.   Eyes:      Pupils: Pupils are equal, round, and reactive to light.   Cardiovascular:      Rate and Rhythm: Normal rate.   Pulmonary:      Effort: Pulmonary effort is normal.   Abdominal:      General: Abdomen is flat.   Musculoskeletal:      Cervical back: Normal range of motion.      Comments: R aka    Skin:     Capillary Refill: Capillary refill takes less than 2 seconds.   Neurological:      Mental Status: He is alert.           Results Review:  I have reviewed the labs, radiology results, and diagnostic studies.    Laboratory Data:   Results from last 7 days   Lab Units 07/06/25  0537 07/05/25  1756 07/03/25  0359   WBC 10*3/mm3 22.70* 20.93* 8.58   HEMOGLOBIN g/dL 8.7* 7.7* 8.2*   HEMATOCRIT % 27.4* 23.4* 25.4*   PLATELETS 10*3/mm3 159 170 160        Results from last 7 days   Lab Units 07/06/25 0537 07/05/25  1756 07/03/25  0359   SODIUM mmol/L 132* 135* 134*   POTASSIUM mmol/L 3.7 3.7 3.6   CHLORIDE mmol/L 97* 99 101   CO2 mmol/L 25.0 24.0 24.0   BUN mg/dL 33.9* 29.6* 23.0   CREATININE mg/dL 2.47* 2.02* 1.82*   CALCIUM mg/dL 8.5* 8.4* 8.6   BILIRUBIN mg/dL 0.4 0.3 0.4   ALK PHOS U/L 312* 322* 253*   ALT (SGPT) U/L 48* 39 32   AST (SGOT) U/L 51* 46* 40   GLUCOSE mg/dL 94 103* 93       Culture Data:   No results found for: \"BLOODCX\", \"URINECX\", \"WOUNDCX\", \"MRSACX\", \"RESPCX\", \"STOOLCX\"    Radiology Data:   Imaging Results (Last 24 Hours)       Procedure Component Value Units Date/Time    XR Chest 1 View [[id number]] Collected: 07/05/25 1722     " Updated: 07/05/25 1729    Narrative:      EXAMINATION: XR CHEST 1 VW- 7/5/2025 5:23 PM     HISTORY: Fever.     REPORT: Frontal view of the chest was obtained.     COMPARISON: Chest x-ray 6/19/2025.     There is interval improvement, near resolution of interstitial  infiltrates in the right lung, no lung consolidation or focal infiltrate  is identified. The left lung remains clear. The right internal jugular  central venous catheter appears in good position as before. Previous  median sternotomy and CABG is noted. Heart size appears normal, no  evidence of overt CHF is identified. The osseous structures are acutely  unremarkable. Mixed sclerotic/lytic changes in the right humeral head  appear similar, may be associated with osteonecrosis.       Impression:      Partial resolution of interstitial infiltrates in the right  lung, mild residual right-sided interstitial pneumonitis or mild  asymmetric pulmonary edema may be present. No lung consolidation or  acute focal infiltrate. Previous CABG. The right internal jugular  central venous catheter remains in good position.     This report was signed and finalized on 7/5/2025 5:26 PM by Dr. Ash Seay MD.               I have reviewed the patient's current medications.     Assessment/Plan   Assessment  Active Hospital Problems    Diagnosis     **Sepsis     Elevated troponin     Coronary artery disease involving native coronary artery of native heart without angina pectoris     ESRD (end stage renal disease) on dialysis     Chronic diastolic (congestive) heart failure     Anemia due to chronic kidney disease     Peripheral arterial disease     IHD (ischemic heart disease)     Essential hypertension        Treatment Plan  #1 sepsis -patient presenting with altered mental status and a fever of 103.  WBC of 20K.  Urinalysis seems abnormal and possibly indicative of UTI but this is a dialysis patient and potentially may have chronic abnormal urine.  Fever did seem to  occur after access of his dialysis catheter.  Other differential would be a catheter line infection but there is no obvious erythema or loculation at catheter site.  Urine and blood cultures have been sent.  Continue ceftriaxone 2 g every 24 hours and monitor cultures.  Currently pressure is stable, given he is a dialysis patient with a history of diastolic CHF will avoid fluid bolus at this time.     #2 urinalysis -likely UTI given concern for sepsis and mental status changes.  Continue ceftriaxone.  Follow cultures.     #3 confusion/altered mental status -suspect metabolic encephalopathy in the setting of #1 and 2 above.  Treat underlying infections and monitor.     #4 NSTEMI/CAD -patient is a known vasculopath with prior CABG at least 20 years ago per report.  Do not see any recent ischemic evaluation.  Had a troponin of 147 on arrival which increased to 176.  He is a dialysis patient which could lead to elevated labs.  He is denying any chest pain.  That being said he has EKG changes showing lateral ST-T segment depressions in 1 as well as V4 through 6.  Note he had some similar changes in V4 through V6 back on his EKG in January but not to the extent of current depressions.  Again he is chest pain-free.  Trend troponin.  Recheck another EKG at next troponin check.  Cardiology was called from the ER and aware of patient.  Continue beta-blocker, Imdur, aspirin, statin, Plavix.  DVT prophylaxis heparin.  Discussed with Dr. Raines.     #5 ESRD -next dialysis is due Monday 7/7.  Will consult nephrology tomorrow to help prepare for planned dialysis session.  Monitor blood cultures given concern of fever and events after catheter access.  Catheter line infection remains in differential.     #6 anemia of chronic disease -H&H similar to last hospitalization.  Patient did require some PRBC transfusion last hospital stay.  Monitor closely.  Continue oral iron.     #7 chronic diastolic CHF -currently appears euvolemic  without signs of overload.  Status post dialysis earlier today.  Monitor.     #8 history of constipation -bowel regimen        Medical Decision Making  Number and Complexity of problems: 3-4 acute, multiple chronic     Differential Diagnosis: as above     Conditions and Status        New to me this stay, septic and being admitted to hospital for further care     MDM Data  External documents reviewed: none  Cardiac tracing (EKG, telemetry) interpretation: sinus with some lateral st segment depressions  Radiology interpretation: reports reviewed  Labs reviewed: as above  Any tests that were considered but not ordered: none     Decision rules/scores evaluated (example [id number], [place], etc): none     Discussed with: patient, family, ER provider, Dr. Raines     Care Planning  Shared decision making: Patient apprised of current labs, vitals, imaging and treatment plan.  They are agreeable with proceeding with plans as discussed.     Code status and discussions: full code     Disposition  Social Determinants of Health that impact treatment or disposition: none  Estimated length of stay is 2+ days.      I confirmed that the patient's advanced care plan is present, code status is documented, and a surrogate decision maker is listed in the patient's medical record.      The patient's surrogate decision maker is his daughter Kathleen       Electronically signed by Vidal Miramontes MD, 07/06/25, 11:18 CDT.

## 2025-07-06 NOTE — H&P
[facility] Medicine Services  HISTORY AND PHYSICAL    Date of Admission: 7/5/2025  Primary Care Physician: Nathan Zimmer MD    Subjective   Primary Historian: patient/family    Chief Complaint: confusion and fever    History of Present Illness  Patient is a 75-year-old vasculopath with known CAD post CABG in early 2000's (? 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  Currently patient is at a skilled nursing facility after recent surgery.  Patient typically gets dialysis on a Monday Wednesday and Friday.  Apparently had missed dialysis yesterday due to transport error.  Patient went for dialysis today and then after dialysis was noted to be confused with a temperature of 105.  Due to that he was sent to our ER.  Here in the ER he has a fever of 103 which is come down with Tylenol.  He has a white count of 21.  Blood pressure is stable.  Urinalysis concerning for possible infection.  I saw him in ER 40 with family at bedside.  He was asleep but easily awoken.  He is interactive.  He is mildly confused but appears to be answering questions appropriately.  He knew where he was but did not know the month or the year.  He says he feels okay at this time.  Denies any cough or phlegm.  Denies any chest pain or shortness of breath.  No abdominal pain or nausea.  No new focal numbness or weakness.  Urine and blood cultures have been sent from the ER.  He has been given a dose of ceftriaxone.  We have been asked admit for further care.            Review of Systems   Otherwise complete ROS reviewed and negative except as mentioned in the HPI.    Past Medical History:   Past Medical History:   Diagnosis Date    3-vessel CAD 08/11/2020    Allergic rhinitis     Anemia     Anxiety disorder 04/27/2020    Arthritis     Asymmetrical sensorineural hearing loss 06/28/2017     Atherosclerosis of native artery of both lower extremities with intermittent claudication 07/18/2019    Avascular necrosis of femoral head, left 07/11/2020    right hip after surgery    Carotid stenosis     Chronic mucoid otitis media     Chronic rhinitis     COPD (chronic obstructive pulmonary disease)     Coronary artery disease     HEART BYPASS 2004    Crohn's disease of large intestine with other complication 07/30/2020    Chronic diarrhea Colonoscopy July 2020 revealed mild patchy scattered hemosiderin staining with inflammation more so in rectosigmoid area.  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    Iron deficiency anemia 07/14/2020    Mixed hearing loss of left ear     PAD (peripheral artery disease) 08/11/2020    Perianal abscess     Pernicious anemia 08/17/2020    took shots but never diagnosed with b12 deficiency    Personal history of alcoholism 08/11/2020    quit drinking in 2013    Prostatic hypertrophy 08/11/2020    Sensorineural hearing loss     Sepsis with acute renal failure 09/15/2020    Shortness of breath 05/27/2021    Tinnitus     Ventricular tachycardia, nonsustained 07/14/2020    Weight loss 07/11/2020     Past Surgical History:  Past Surgical History:   Procedure Laterality Date    ABOVE KNEE AMPUTATION Right 6/24/2025    Procedure: right above knee amputation;  Surgeon: Blayne Zabala MD;  Location:  [facility] OR;  Service: Vascular;  Laterality: Right;    AORTOGRAM Right 4/25/2025    Procedure: RIGHT LOWER EXTREMITY ANGIOGRAM, SHOCKWAVE LITHOTRIPSY, BALLOON ANGIOPLASTY, MYNX CLOSURE;  Surgeon: Gil Pineda DO;  Location:  [facility]  HYBRID OR;  Service: Vascular;  Laterality: Right;    AORTOGRAM Left 5/22/2025    Procedure: LEFT LOWER EXTREMITY ANGIOGRAM, SHOCKWAVE LITHOTRIPSY, BALLOON ANGIOPLASTY, STENT PLACEMENT, MYNX CLOSURE;  Surgeon: Blayne Zabala MD;  Location: [facility] HYBRID OR;  Service: Vascular;  Laterality: Left;    AORTOGRAM Right 5/30/2025    Procedure: AORTOILIAC ANGIOGRAM WITH LEFT LOWER EXTREMITY ANGIOGRAM;  Surgeon: Gil Pineda DO;  Location: [facility] HYBRID OR;  Service: Vascular;  Laterality: Right;    AORTOGRAM Right 6/20/2025    Procedure: right lower extremity arteriogram, shockwave lithotripsy, balloon angioplasty, mynx closue;  Surgeon: Blayne Zabala MD;  Location: [facility] HYBRID OR;  Service: Vascular;  Laterality: Right;    ARTERY SURGERY  2021    right side on neck    CAROTID ENDARTERECTOMY Right 05/10/2021    Procedure: RIGHT CAROTID ENDARTERECTOMY WITH EEG;  Surgeon: Gil Pineda DO;  Location: [facility] HYBRID OR 12;  Service: Vascular;  Laterality: Right;    COLONOSCOPY N/A 07/02/2020    Procedure: COLONOSCOPY WITH ANESTHESIA;  Surgeon: Adrien Brewster MD;  Location: [facility] ENDOSCOPY;  Service: Gastroenterology;  Laterality: N/A;  pre op: diarrhea  post op: polyps  PCP: Joe Velasco MD    COLONOSCOPY N/A 10/13/2020    Procedure: COLONOSCOPY WITH ANESTHESIA;  Surgeon: Adrien Brewster MD;  Location: [facility] ENDOSCOPY;  Service: Gastroenterology;  Laterality: N/A;  Pre: Chronic Diarrhea, Crohn's  Post: AVM  Dr. Neftali Velasco  CO2 Inflation Used    COLONOSCOPY N/A 12/08/2023    Procedure: COLONOSCOPY WITH ANESTHESIA;  Surgeon: Adrien Brewster MD;  Location: [facility] ENDOSCOPY;  Service: Gastroenterology;  Laterality: N/A;  pre chrone's disease  post sub optimal prep, polyp, chrone's      CORONARY ARTERY BYPASS GRAFT  2003    x3    ENDOSCOPY N/A 11/02/2021    Procedure: ESOPHAGOGASTRODUODENOSCOPY WITH ANESTHESIA;  Surgeon: Bridger Bell MD;  Location: [facility] ENDOSCOPY;  Service:  Gastroenterology;  Laterality: N/A;  pre anemia;gi bleed  post  gi bleed;schatski ring  Dr. ERIC Velasco    ENDOSCOPY N/A 10/10/2023    Procedure: ESOPHAGOGASTRODUODENOSCOPY WITH ANESTHESIA;  Surgeon: Adrien Brewster MD;  Location: [facility] ENDOSCOPY;  Service: Gastroenterology;  Laterality: N/A;  preop; anemia  postop esophagitis ; R/O barretts   PCP Randall Beata    EYE SURGERY Bilateral     catorac    INCISION AND DRAINAGE PERIRECTAL ABSCESS N/A 03/03/2017    Procedure: INCISION AND DRAINAGE OF JEET ANAL ABSCESS;  Surgeon: Lynette Smith MD;  Location: [facility] OR;  Service:     INGUINAL HERNIA REPAIR Bilateral 06/27/2023    Procedure: INGUINAL HERNIA BILATERAL REPAIR LAPAROSCOPIC WITH DAVINCI ROBOT WITH MESH;  Surgeon: Tahira Rivera MD;  Location: [facility] OR;  Service: Robotics - DaVinci;  Laterality: Bilateral;    INSERTION HEMODIALYSIS CATHETER N/A 4/16/2025    Procedure: HEMODIALYSIS CATHETER PLACEMENT;  Surgeon: Gil Pineda DO;  Location: [facility] HYBRID OR;  Service: Vascular;  Laterality: N/A;    MYRINGOTOMY W/ TUBES Left 04/17/2017    06/10/2016    TONSILLECTOMY      TOTAL HIP ARTHROPLASTY Right 2006     Social History:  reports that he quit smoking about 11 years ago. His smoking use included cigarettes. He started smoking about 37 years ago. He has a 12.9 pack-year smoking history. He has been exposed to tobacco smoke. He has never used smokeless tobacco. He reports that he does not currently use alcohol. He reports that he does not use drugs.    Family History: family history includes Breast cancer in his mother; Dementia in his father; Glaucoma in his father; No Known Problems in his daughter.       Allergies:  Allergies   Allergen Reactions    Ondansetron Anaphylaxis and GI Intolerance    Zofran [Ondansetron Hcl] Anaphylaxis    Lortab [Hydrocodone-Acetaminophen] Other (See Comments) and Hallucinations     CLOSTROPHOBIC    Allopurinol Other (See Comments)     Pain on right side        Medications:  Prior to Admission medications    Medication Sig Start Date End Date Taking? Authorizing Provider   acetaminophen (TYLENOL) 325 MG tablet Take 2 tablets by mouth Every 4 (Four) Hours As Needed for Mild Pain. 6/21/25   Dc Issa DO   albuterol sulfate  (90 Base) MCG/ACT inhaler Inhale 2 puffs Every 6 (Six) Hours As Needed for Wheezing or Shortness of Air.    Twyla Guevara MD   aspirin (aspirin) 81 MG EC tablet Take 1 tablet by mouth Daily.    Twyla Guevara MD   atorvastatin (LIPITOR) 10 MG tablet Take 1 tablet by mouth Daily. 9/4/24   Nathan Zimmer MD   bisacodyl (DULCOLAX) 10 MG suppository Insert 1 suppository into the rectum Daily As Needed for Constipation.    Twyla Guevara MD   Budeson-Glycopyrrol-Formoterol (Breztri Aerosphere) 160-9-4.8 MCG/ACT aerosol inhaler Inhale 2 puffs 2 (Two) Times a Day.    Twyla Guevara MD   calcitriol (ROCALTROL) 0.5 MCG capsule Take 1 capsule by mouth Daily. 1/28/25   Nathan Zimmer MD   carvedilol (COREG) 12.5 MG tablet Take 1 tablet by mouth 2 (Two) Times a Day With Meals. 6/4/25   Garrett Comer MD   clopidogrel (PLAVIX) 75 MG tablet Take 1 tablet by mouth Daily.    Twyla Guevara MD   desoximetasone (TOPICORT) 0.25 % cream Apply 1 Application topically to the appropriate area as directed 2 (Two) Times a Day As Needed for Irritation. irritation 6/4/25   Nathan Zimmer MD   docusate sodium (COLACE) 100 MG capsule Take 1 capsule by mouth 3 (Three) Times a Day As Needed for Constipation.    Twyla Guevara MD   empagliflozin (Jardiance) 10 MG tablet tablet Take 1 tablet by mouth Daily.    Twyla Guevara MD   esomeprazole (nexIUM) 20 MG capsule Take 1 capsule by mouth Every Morning Before Breakfast.    Twyla Guevara MD   fluticasone (FLONASE) 50 MCG/ACT nasal spray Administer 2 sprays into the nostril(s) as directed by provider Daily As Needed for Allergies.    Paola  MD Twyla   gabapentin (NEURONTIN) 300 MG capsule Take 1 capsule by mouth Every Night for 24 doses. 7/3/25 7/27/25  Garrett Comer MD   hydrALAZINE (APRESOLINE) 100 MG tablet Take 0.5 tablets by mouth 3 (Three) Times a Day. 7/2/25   Garrett Comer MD   iron polysaccharides (NIFEREX) 150 MG capsule Take 1 capsule by mouth Daily. 6/4/25   Garrett Comer MD   isosorbide dinitrate (ISORDIL) 10 MG tablet Take 1 tablet by mouth 3 (Three) Times a Day. 1/28/25   Nathan Zimmer MD   levothyroxine (Synthroid) 100 MCG tablet Take 1 tablet by mouth Every Morning. 6/4/25   Nathan Zimmer MD   magnesium chloride ER 64 MG DR tablet Take 1 tablet by mouth Daily.    Twyla Guevara MD   montelukast (SINGULAIR) 10 MG tablet Take 1 tablet by mouth Every Night.    Twyla Guevara MD   Multiple Vitamins-Minerals (PRESERVISION/LUTEIN) capsule Take 1 capsule by mouth 2 (two) times a day.    Twyla Guevara MD   naloxone (NARCAN) 4 MG/0.1ML nasal spray Call 911. Don't prime. [place] in 1 nostril for overdose. Repeat in 2-3 minutes in other nostril if no or minimal breathing/responsiveness. 6/4/25   Garrett Comer MD   NIFEdipine XL (ADALAT CC) 60 MG 24 hr tablet Take 2 tablets by mouth Daily. 6/5/25   Garrett Comer MD   oxyCODONE-acetaminophen (PERCOCET) 5-325 MG per tablet Take 1 tablet by mouth Every 6 (Six) Hours As Needed for Moderate Pain for up to 24 doses. 7/3/25   Garrett Comer MD   polyethylene glycol (MIRALAX) 17 g packet Take 17 g by mouth Daily As Needed (constipation).    Twyla Guevara MD   senna 8.6 MG tablet Take 2 tablets by mouth Daily As Needed for Constipation.    Twyla Guevara MD   tamsulosin (FLOMAX) 0.4 MG capsule 24 hr capsule Take 1 capsule by mouth Every Night.    Twyla Guevara MD   valsartan (DIOVAN) 320 MG tablet Take 1 tablet by mouth Daily. 6/5/25   Garrett Comer MD   vitamin D (ERGOCALCIFEROL) 1.25 MG (33691 UT) capsule capsule Take 1 capsule by  "mouth 1 (One) Time Per Week. Mondays    Provider, MD REMIGIO [place] have utilized all available immediate resources to obtain, update, or review the patient's current medications (including all prescriptions, over-the-counter products, herbals, cannabis/cannabidiol products, and vitamin/mineral/dietary (nutritional) supplements).    Objective     Vital Signs: /48   Pulse 78   Temp 99.2 °F (37.3 °C) (Oral)   Resp 18   Ht 182.9 cm (72\")   Wt 49.9 kg (110 lb)   SpO2 98%   BMI 14.92 kg/m²   Physical Exam   GEN: Awake, alert, interactive, in NAD  HEENT: PERRLA, EOMI, Anicteric, Trachea midline  Lungs: no wheezing/rales/rhonchi  Heart: RRR, +S1/s2, no rub  ABD: soft, nt/nd, +BS, no guarding/rebound  Extremities: R AKA. Stump isn't swollen. Mild irritation along base but no significant erythema and no drainage. No loculation.   Skin: no rashes or petechiae  Neuro: AAOx2, no obvious focal deficits, gait not tested  Psych: normal mood & affect        Results Reviewed:  Lab Results (last 24 hours)       Procedure Component Value Units Date/Time    High Sensitivity Troponin T 1Hr [[id number]]  (Abnormal) Collected: 07/05/25 1946    Specimen: Blood from Arm, Left Updated: 07/05/25 2015     HS Troponin T 176 ng/L      Troponin T Numeric Delta 29 ng/L      Troponin T % Delta 20    Narrative:      High Sensitive Troponin T Reference Range:  <14.0 ng/L- Negative Female for AMI  <22.0 ng/L- Negative Male for AMI  >=14 - Abnormal Female indicating possible myocardial injury.  >=22 - Abnormal Male indicating possible myocardial injury.   Clinicians would have to utilize clinical acumen, EKG, Troponin, and serial changes to determine if it is an Acute Myocardial Infarction or myocardial injury due to an underlying chronic condition.         High Sensitivity Troponin T [[id number]]  (Abnormal) Collected: 07/05/25 1756    Specimen: Blood from Arm, Left Updated: 07/05/25 1937     HS Troponin T 147 ng/L     Narrative:      " High Sensitive Troponin T Reference Range:  <14.0 ng/L- Negative Female for AMI  <22.0 ng/L- Negative Male for AMI  >=14 - Abnormal Female indicating possible myocardial injury.  >=22 - Abnormal Male indicating possible myocardial injury.   Clinicians would have to utilize clinical acumen, EKG, Troponin, and serial changes to determine if it is an Acute Myocardial Infarction or myocardial injury due to an underlying chronic condition.         Comprehensive Metabolic Panel [[id number]]  (Abnormal) Collected: 07/05/25 1756    Specimen: Blood from Arm, Left Updated: 07/05/25 1933     Glucose 103 mg/dL      BUN 29.6 mg/dL      Creatinine 2.02 mg/dL      Sodium 135 mmol/L      Potassium 3.7 mmol/L      Chloride 99 mmol/L      CO2 24.0 mmol/L      Calcium 8.4 mg/dL      Total Protein 5.4 g/dL      Albumin 2.8 g/dL      ALT (SGPT) 39 U/L      AST (SGOT) 46 U/L      Alkaline Phosphatase 322 U/L      Total Bilirubin 0.3 mg/dL      Globulin 2.6 gm/dL      A/G Ratio 1.1 g/dL      BUN/Creatinine Ratio 14.7     Anion Gap 12.0 mmol/L      eGFR 33.3 mL/min/1.73     Narrative:      GFR Categories in Chronic Kidney Disease (CKD)              GFR Category          GFR (mL/min/1.73)    Interpretation  G1                    90 or greater        Normal or high (1)  G2                    60-89                Mild decrease (1)  G3a                   45-59                Mild to moderate decrease  G3b                   30-44                Moderate to severe decrease  G4                    15-29                Severe decrease  G5                    14 or less           Kidney failure    (1)In the absence of evidence of kidney disease, neither GFR category G1 or G2 fulfill the criteria for CKD.    eGFR calculation 2021 CKD-EPI creatinine equation, which does not include race as a factor    Blood Culture - Blood, Arm, Left [[id number]] Collected: 07/05/25 1731    Specimen: Blood from Arm, Left Updated: 07/05/25 1921    Urinalysis With Culture  If Indicated - Straight Cath [[id number]]  (Abnormal) Collected: 07/05/25 1841    Specimen: Urine from Straight Cath Updated: 07/05/25 1913     Color, UA Yellow     Appearance, UA Turbid     pH, UA 7.5     Specific Gravity, UA 1.014     Glucose,  mg/dL (Trace)     Ketones, UA Negative     Bilirubin, UA Negative     Blood, UA Large (3+)     Protein, UA >=300 mg/dL (3+)     Leuk Esterase, UA Large (3+)     Nitrite, UA Positive     Urobilinogen, UA 1.0 E.U./dL    Narrative:      In absence of clinical symptoms, the presence of pyuria, bacteria, and/or nitrites on the urinalysis result does not correlate with infection.    Urinalysis, Microscopic Only - Straight Cath [[id number]]  (Abnormal) Collected: 07/05/25 1841    Specimen: Urine from Straight Cath Updated: 07/05/25 1913     RBC, UA Too Numerous to Count /HPF      WBC, UA Too Numerous to Count /HPF      Bacteria, UA 4+ /HPF      Squamous Epithelial Cells, UA None Seen /HPF      Hyaline Casts, UA None Seen /LPF      Methodology Manual Light Microscopy    Urine Culture - Urine, Straight Cath [[id number]] Collected: 07/05/25 1841    Specimen: Urine from Straight Cath Updated: 07/05/25 1913    Respiratory Panel PCR w/COVID-19(SARS-CoV-2) [facility] In-House, NP Swab in UTM/VTM, 2 HR TAT - Swab, Nasopharynx [[id number]]  (Normal) Collected: 07/05/25 1725    Specimen: Swab from Nasopharynx Updated: 07/05/25 1829     ADENOVIRUS, PCR Not Detected     Coronavirus 229E Not Detected     Coronavirus HKU1 Not Detected     Coronavirus NL63 Not Detected     Coronavirus OC43 Not Detected     COVID19 Not Detected     Human Metapneumovirus Not Detected     Human Rhinovirus/Enterovirus Not Detected     Influenza A PCR Not Detected     Influenza B PCR Not Detected     Parainfluenza Virus 1 Not Detected     Parainfluenza Virus 2 Not Detected     Parainfluenza Virus 3 Not Detected     Parainfluenza Virus 4 Not Detected     RSV, PCR Not Detected     Bordetella pertussis  pcr Not Detected     Bordetella parapertussis PCR Not Detected     Chlamydophila pneumoniae PCR Not Detected     Mycoplasma pneumo by PCR Not Detected    Narrative:      In the setting of a positive respiratory panel with a viral infection PLUS a negative procalcitonin without other underlying concern for bacterial infection, consider observing off antibiotics or discontinuation of antibiotics and continue supportive care. If the respiratory panel is positive for atypical bacterial infection (Bordetella pertussis, Chlamydophila pneumoniae, or Mycoplasma pneumoniae), consider antibiotic de-escalation to target atypical bacterial infection.    Lactic Acid, Plasma [[id number]]  (Normal) Collected: 07/05/25 1758    Specimen: Blood from Arm, Left Updated: 07/05/25 1825     Lactate 2.0 mmol/L     CBC & Differential [[id number]]  (Abnormal) Collected: 07/05/25 1756    Specimen: Blood from Arm, Left Updated: 07/05/25 1811    Narrative:      The following orders were created for panel order CBC & Differential.  Procedure                               Abnormality         Status                     ---------                               -----------         ------                     CBC Auto Differential[[id number]]        Abnormal            Final result                 Please view results for these tests on the individual orders.    CBC Auto Differential [[id number]]  (Abnormal) Collected: 07/05/25 1756    Specimen: Blood from Arm, Left Updated: 07/05/25 1811     WBC 20.93 10*3/mm3      RBC 2.40 10*6/mm3      Hemoglobin 7.7 g/dL      Hematocrit 23.4 %      MCV 97.5 fL      MCH 32.1 pg      MCHC 32.9 g/dL      RDW 16.2 %      RDW-SD 56.2 fl      MPV 9.7 fL      Platelets 170 10*3/mm3      Neutrophil % 85.8 %      Lymphocyte % 3.7 %      Monocyte % 7.6 %      Eosinophil % 0.9 %      Basophil % 0.3 %      Immature Grans % 1.7 %      Neutrophils, Absolute 17.95 10*3/mm3      Lymphocytes, Absolute 0.77 10*3/mm3      Monocytes,  Absolute 1.60 10*3/mm3      Eosinophils, Absolute 0.18 10*3/mm3      Basophils, Absolute 0.07 10*3/mm3      Immature Grans, Absolute 0.36 10*3/mm3      nRBC 0.0 /100 WBC     Blood Culture - Blood, Arm, Left [[id number]] Collected: 07/05/25 1731    Specimen: Blood from Arm, Left Updated: 07/05/25 1739          Imaging Results (Last 24 Hours)       Procedure Component Value Units Date/Time    XR Chest 1 View [[id number]] Collected: 07/05/25 1722     Updated: 07/05/25 1729    Narrative:      EXAMINATION: XR CHEST 1 VW- 7/5/2025 5:23 PM     HISTORY: Fever.     REPORT: Frontal view of the chest was obtained.     COMPARISON: Chest x-ray 6/19/2025.     There is interval improvement, near resolution of interstitial  infiltrates in the right lung, no lung consolidation or focal infiltrate  is identified. The left lung remains clear. The right internal jugular  central venous catheter appears in good position as before. Previous  median sternotomy and CABG is noted. Heart size appears normal, no  evidence of overt CHF is identified. The osseous structures are acutely  unremarkable. Mixed sclerotic/lytic changes in the right humeral head  appear similar, may be associated with osteonecrosis.       Impression:      Partial resolution of interstitial infiltrates in the right  lung, mild residual right-sided interstitial pneumonitis or mild  asymmetric pulmonary edema may be present. No lung consolidation or  acute focal infiltrate. Previous CABG. The right internal jugular  central venous catheter remains in good position.     This report was signed and finalized on 7/5/2025 5:26 PM by Dr. Ash Seay MD.             I have personally reviewed and interpreted the radiology studies and ECG obtained at time of admission.     Assessment / Plan   Assessment:   Active Hospital Problems    Diagnosis     **Sepsis     ESRD (end stage renal disease) on dialysis     Chronic diastolic (congestive) heart failure     Anemia due to chronic  kidney disease     Peripheral arterial disease     IHD (ischemic heart disease)     Essential hypertension        Treatment Plan  The patient will be admitted to my service here at [facility].     #1 sepsis -patient presenting with altered mental status and a fever of 103.  WBC of 20K.  Urinalysis seems abnormal and possibly indicative of UTI but this is a dialysis patient and potentially may have chronic abnormal urine.  Fever did seem to occur after access of his dialysis catheter.  Other differential would be a catheter line infection but there is no obvious erythema or loculation at catheter site.  Urine and blood cultures have been sent.  Continue ceftriaxone 2 g every 24 hours and monitor cultures.  Currently pressure is stable, given he is a dialysis patient with a history of diastolic CHF will avoid fluid bolus at this time.    #2 urinalysis -likely UTI given concern for sepsis and mental status changes.  Continue ceftriaxone.  Follow cultures.    #3 confusion/altered mental status -suspect metabolic encephalopathy in the setting of #1 and 2 above.  Treat underlying infections and monitor.    #4 NSTEMI/CAD -patient is a known vasculopath with prior CABG at least 20 years ago per report.  Do not see any recent ischemic evaluation.  Had a troponin of 147 on arrival which increased to 176.  He is a dialysis patient which could lead to elevated labs.  He is denying any chest pain.  That being said he has EKG changes showing lateral ST-T segment depressions in 1 as well as V4 through 6.  Note he had some similar changes in V4 through V6 back on his EKG in January but not to the extent of current depressions.  Again he is chest pain-free.  Trend troponin.  Recheck another EKG at next troponin check.  Cardiology was called from the ER and aware of patient.  Continue beta-blocker, Imdur, aspirin, statin, Plavix.  DVT prophylaxis heparin.  Discussed with Dr. Raines.    #5 ESRD -next dialysis is due  Monday 7/7.  Will consult nephrology tomorrow to help prepare for planned dialysis session.  Monitor blood cultures given concern of fever and events after catheter access.  Catheter line infection remains in differential.    #6 anemia of chronic disease -H&H similar to last hospitalization.  Patient did require some PRBC transfusion last hospital stay.  Monitor closely.  Continue oral iron.    #7 chronic diastolic CHF -currently appears euvolemic without signs of overload.  Status post dialysis earlier today.  Monitor.    #8 history of constipation -bowel regimen      Medical Decision Making  Number and Complexity of problems: 3-4 acute, multiple chronic    Differential Diagnosis: as above    Conditions and Status        New to me this stay, septic and being admitted to hospital for further care     MDM Data  External documents reviewed: none  Cardiac tracing (EKG, telemetry) interpretation: sinus with some lateral st segment depressions  Radiology interpretation: reports reviewed  Labs reviewed: as above  Any tests that were considered but not ordered: none     Decision rules/scores evaluated (example [id number], Wells, etc): none     Discussed with: patient, family, ER provider, Dr. Raines     Care Planning  Shared decision making: Patient apprised of current labs, vitals, imaging and treatment plan.  They are agreeable with proceeding with plans as discussed.    Code status and discussions: full code    Disposition  Social Determinants of Health that impact treatment or disposition: none  Estimated length of stay is 2+ days.     I confirmed that the patient's advanced care plan is present, code status is documented, and a surrogate decision maker is listed in the patient's medical record.     The patient's surrogate decision maker is his daughter Kathleen     The patient was seen and examined by me on 7/5/25 at 8:45 PM.    Electronically signed by Dc Issa DO, 07/05/25, 21:19 CDT.

## 2025-07-06 NOTE — PLAN OF CARE
Goal Outcome Evaluation:              Outcome Evaluation: Adm tonight. ALOX4. RA. incont. brief. no fever dialysis cath looks CDI

## 2025-07-07 ENCOUNTER — TELEPHONE (OUTPATIENT)
Dept: VASCULAR SURGERY | Facility: CLINIC | Age: 75
End: 2025-07-07

## 2025-07-07 ENCOUNTER — APPOINTMENT (OUTPATIENT)
Dept: CARDIOLOGY | Facility: HOSPITAL | Age: 75
End: 2025-07-07
Payer: MEDICARE

## 2025-07-07 DIAGNOSIS — I25.10 CORONARY ARTERY DISEASE INVOLVING NATIVE CORONARY ARTERY OF NATIVE HEART WITHOUT ANGINA PECTORIS: Primary | ICD-10-CM

## 2025-07-07 LAB
ALBUMIN SERPL-MCNC: 2.7 G/DL (ref 3.5–5.2)
ALBUMIN/GLOB SERPL: 0.9 G/DL
ALP SERPL-CCNC: 268 U/L (ref 39–117)
ALT SERPL W P-5'-P-CCNC: 29 U/L (ref 1–41)
ANION GAP SERPL CALCULATED.3IONS-SCNC: 12 MMOL/L (ref 5–15)
AST SERPL-CCNC: 23 U/L (ref 1–40)
BASOPHILS # BLD AUTO: 0.08 10*3/MM3 (ref 0–0.2)
BASOPHILS NFR BLD AUTO: 0.5 % (ref 0–1.5)
BH CV ECHO MEAS - EDV(CUBED): 95.9 ML
BH CV ECHO MEAS - EDV(MOD-SP4): 120 ML
BH CV ECHO MEAS - EF(MOD-SP4): 56.3 %
BH CV ECHO MEAS - ESV(MOD-SP4): 52.4 ML
BH CV ECHO MEAS - IVS/LVPW: 1.03 CM
BH CV ECHO MEAS - IVSD: 1.44 CM
BH CV ECHO MEAS - LV DIASTOLIC VOL/BSA (35-75): 69.3 CM2
BH CV ECHO MEAS - LV MASS(C)D: 259.8 GRAMS
BH CV ECHO MEAS - LV SYSTOLIC VOL/BSA (12-30): 30.2 CM2
BH CV ECHO MEAS - LVIDD: 4.6 CM
BH CV ECHO MEAS - LVPWD: 1.4 CM
BH CV ECHO MEAS - SV(MOD-SP4): 67.6 ML
BH CV ECHO MEAS - SVI(MOD-SP4): 39 ML/M2
BILIRUB SERPL-MCNC: 0.2 MG/DL (ref 0–1.2)
BUN SERPL-MCNC: 42.9 MG/DL (ref 8–23)
BUN/CREAT SERPL: 13.8 (ref 7–25)
CALCIUM SPEC-SCNC: 9 MG/DL (ref 8.6–10.5)
CHLORIDE SERPL-SCNC: 100 MMOL/L (ref 98–107)
CO2 SERPL-SCNC: 22 MMOL/L (ref 22–29)
CREAT SERPL-MCNC: 3.1 MG/DL (ref 0.76–1.27)
DEPRECATED RDW RBC AUTO: 59.7 FL (ref 37–54)
EGFRCR SERPLBLD CKD-EPI 2021: 19.9 ML/MIN/1.73
EOSINOPHIL # BLD AUTO: 0.42 10*3/MM3 (ref 0–0.4)
EOSINOPHIL NFR BLD AUTO: 2.5 % (ref 0.3–6.2)
ERYTHROCYTE [DISTWIDTH] IN BLOOD BY AUTOMATED COUNT: 17.2 % (ref 12.3–15.4)
GLOBULIN UR ELPH-MCNC: 2.9 GM/DL
GLUCOSE SERPL-MCNC: 96 MG/DL (ref 65–99)
HCT VFR BLD AUTO: 27 % (ref 37.5–51)
HGB BLD-MCNC: 8.6 G/DL (ref 13–17.7)
IMM GRANULOCYTES # BLD AUTO: 0.27 10*3/MM3 (ref 0–0.05)
IMM GRANULOCYTES NFR BLD AUTO: 1.6 % (ref 0–0.5)
LYMPHOCYTES # BLD AUTO: 1.14 10*3/MM3 (ref 0.7–3.1)
LYMPHOCYTES NFR BLD AUTO: 6.7 % (ref 19.6–45.3)
MCH RBC QN AUTO: 31.4 PG (ref 26.6–33)
MCHC RBC AUTO-ENTMCNC: 31.9 G/DL (ref 31.5–35.7)
MCV RBC AUTO: 98.5 FL (ref 79–97)
MONOCYTES # BLD AUTO: 0.95 10*3/MM3 (ref 0.1–0.9)
MONOCYTES NFR BLD AUTO: 5.6 % (ref 5–12)
NEUTROPHILS NFR BLD AUTO: 14.1 10*3/MM3 (ref 1.7–7)
NEUTROPHILS NFR BLD AUTO: 83.1 % (ref 42.7–76)
NRBC BLD AUTO-RTO: 0 /100 WBC (ref 0–0.2)
PLATELET # BLD AUTO: 157 10*3/MM3 (ref 140–450)
PMV BLD AUTO: 9.5 FL (ref 6–12)
POTASSIUM SERPL-SCNC: 3.9 MMOL/L (ref 3.5–5.2)
PROT SERPL-MCNC: 5.6 G/DL (ref 6–8.5)
QT INTERVAL: 446 MS
QTC INTERVAL: 504 MS
RBC # BLD AUTO: 2.74 10*6/MM3 (ref 4.14–5.8)
SODIUM SERPL-SCNC: 134 MMOL/L (ref 136–145)
WBC NRBC COR # BLD AUTO: 16.96 10*3/MM3 (ref 3.4–10.8)

## 2025-07-07 PROCEDURE — 25510000001 PERFLUTREN 6.52 MG/ML SUSPENSION 2 ML VIAL: Performed by: HOSPITALIST

## 2025-07-07 PROCEDURE — 93308 TTE F-UP OR LMTD: CPT | Performed by: HOSPITALIST

## 2025-07-07 PROCEDURE — 85025 COMPLETE CBC W/AUTO DIFF WBC: CPT | Performed by: INTERNAL MEDICINE

## 2025-07-07 PROCEDURE — 80053 COMPREHEN METABOLIC PANEL: CPT | Performed by: INTERNAL MEDICINE

## 2025-07-07 PROCEDURE — 25010000002 HEPARIN (PORCINE) PER 1000 UNITS: Performed by: INTERNAL MEDICINE

## 2025-07-07 PROCEDURE — 25010000002 EPOETIN ALFA-EPBX 10000 UNIT/ML SOLUTION: Performed by: INTERNAL MEDICINE

## 2025-07-07 PROCEDURE — 94761 N-INVAS EAR/PLS OXIMETRY MLT: CPT

## 2025-07-07 PROCEDURE — 94799 UNLISTED PULMONARY SVC/PX: CPT

## 2025-07-07 PROCEDURE — 99232 SBSQ HOSP IP/OBS MODERATE 35: CPT

## 2025-07-07 PROCEDURE — 5A1D70Z PERFORMANCE OF URINARY FILTRATION, INTERMITTENT, LESS THAN 6 HOURS PER DAY: ICD-10-PCS | Performed by: FAMILY MEDICINE

## 2025-07-07 PROCEDURE — 25010000002 CEFTRIAXONE PER 250 MG: Performed by: INTERNAL MEDICINE

## 2025-07-07 PROCEDURE — 93308 TTE F-UP OR LMTD: CPT

## 2025-07-07 RX ORDER — SODIUM CHLORIDE 0.9 % (FLUSH) 0.9 %
10 SYRINGE (ML) INJECTION EVERY 12 HOURS SCHEDULED
OUTPATIENT
Start: 2025-07-07

## 2025-07-07 RX ORDER — SODIUM CHLORIDE 0.9 % (FLUSH) 0.9 %
10 SYRINGE (ML) INJECTION AS NEEDED
OUTPATIENT
Start: 2025-07-07

## 2025-07-07 RX ORDER — HEPARIN SODIUM 1000 [USP'U]/ML
3200 INJECTION, SOLUTION INTRAVENOUS; SUBCUTANEOUS AS NEEDED
Status: DISCONTINUED | OUTPATIENT
Start: 2025-07-07 | End: 2025-07-11 | Stop reason: HOSPADM

## 2025-07-07 RX ORDER — SODIUM CHLORIDE 9 MG/ML
40 INJECTION, SOLUTION INTRAVENOUS AS NEEDED
OUTPATIENT
Start: 2025-07-07

## 2025-07-07 RX ADMIN — BUDESONIDE AND FORMOTEROL FUMARATE DIHYDRATE 2 PUFF: 160; 4.5 AEROSOL RESPIRATORY (INHALATION) at 19:22

## 2025-07-07 RX ADMIN — CEFTRIAXONE 2000 MG: 2 INJECTION, POWDER, FOR SOLUTION INTRAMUSCULAR; INTRAVENOUS at 18:06

## 2025-07-07 RX ADMIN — IPRATROPIUM BROMIDE 0.5 MG: 0.5 SOLUTION RESPIRATORY (INHALATION) at 14:28

## 2025-07-07 RX ADMIN — VALSARTAN 320 MG: 80 TABLET, FILM COATED ORAL at 14:29

## 2025-07-07 RX ADMIN — HEPARIN SODIUM 3200 UNITS: 1000 INJECTION, SOLUTION INTRAVENOUS; SUBCUTANEOUS at 12:01

## 2025-07-07 RX ADMIN — MONTELUKAST 10 MG: 10 TABLET, FILM COATED ORAL at 20:40

## 2025-07-07 RX ADMIN — NIFEDIPINE 120 MG: 60 TABLET, FILM COATED, EXTENDED RELEASE ORAL at 14:29

## 2025-07-07 RX ADMIN — Medication 150 MG: at 14:29

## 2025-07-07 RX ADMIN — ASPIRIN 81 MG: 81 TABLET, COATED ORAL at 14:29

## 2025-07-07 RX ADMIN — LEVOTHYROXINE SODIUM 100 MCG: 0.1 TABLET ORAL at 05:10

## 2025-07-07 RX ADMIN — IPRATROPIUM BROMIDE 0.5 MG: 0.5 SOLUTION RESPIRATORY (INHALATION) at 07:16

## 2025-07-07 RX ADMIN — PANTOPRAZOLE SODIUM 40 MG: 40 TABLET, DELAYED RELEASE ORAL at 05:10

## 2025-07-07 RX ADMIN — ISOSORBIDE DINITRATE 10 MG: 10 TABLET ORAL at 14:29

## 2025-07-07 RX ADMIN — ISOSORBIDE DINITRATE 10 MG: 10 TABLET ORAL at 18:14

## 2025-07-07 RX ADMIN — BUDESONIDE AND FORMOTEROL FUMARATE DIHYDRATE 2 PUFF: 160; 4.5 AEROSOL RESPIRATORY (INHALATION) at 07:20

## 2025-07-07 RX ADMIN — PERFLUTREN 10 ML: 6.52 INJECTION, SUSPENSION INTRAVENOUS at 15:58

## 2025-07-07 RX ADMIN — HEPARIN SODIUM 5000 UNITS: 5000 INJECTION INTRAVENOUS; SUBCUTANEOUS at 20:40

## 2025-07-07 RX ADMIN — Medication 10 ML: at 20:41

## 2025-07-07 RX ADMIN — Medication 10 ML: at 14:35

## 2025-07-07 RX ADMIN — ATORVASTATIN CALCIUM 10 MG: 10 TABLET, FILM COATED ORAL at 14:29

## 2025-07-07 RX ADMIN — IPRATROPIUM BROMIDE 0.5 MG: 0.5 SOLUTION RESPIRATORY (INHALATION) at 19:22

## 2025-07-07 RX ADMIN — TAMSULOSIN HYDROCHLORIDE 0.4 MG: 0.4 CAPSULE ORAL at 20:39

## 2025-07-07 RX ADMIN — EPOETIN ALFA-EPBX 10000 UNITS: 10000 INJECTION, SOLUTION INTRAVENOUS; SUBCUTANEOUS at 14:32

## 2025-07-07 RX ADMIN — CALCITRIOL CAPSULES 0.25 MCG 0.5 MCG: 0.25 CAPSULE ORAL at 14:29

## 2025-07-07 RX ADMIN — DOCUSATE SODIUM 50 MG AND SENNOSIDES 8.6 MG 2 TABLET: 8.6; 5 TABLET, FILM COATED ORAL at 20:39

## 2025-07-07 RX ADMIN — OXYCODONE HYDROCHLORIDE AND ACETAMINOPHEN 1 TABLET: 5; 325 TABLET ORAL at 03:02

## 2025-07-07 RX ADMIN — EMPAGLIFLOZIN 10 MG: 10 TABLET, FILM COATED ORAL at 14:29

## 2025-07-07 RX ADMIN — CLOPIDOGREL BISULFATE 75 MG: 75 TABLET, FILM COATED ORAL at 14:29

## 2025-07-07 RX ADMIN — OXYCODONE HYDROCHLORIDE AND ACETAMINOPHEN 1 TABLET: 5; 325 TABLET ORAL at 09:13

## 2025-07-07 RX ADMIN — GABAPENTIN 300 MG: 300 CAPSULE ORAL at 20:39

## 2025-07-07 NOTE — PROGRESS NOTES
[facility] Medicine Services  INPATIENT PROGRESS NOTE    Patient Name: Ricardo Hugo  Date of Admission: 7/5/2025  Today's Date: 07/07/25  Length of Stay: 2  Primary Care Physician: Nathan Zimmer MD    Subjective   Patient is a 75-year-old vasculopath with known CAD post CABG in early 2000's (? 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  Currently patient is at a skilled nursing facility after recent surgery.  Patient typically gets dialysis on a Monday Wednesday and Friday.  Apparently had missed dialysis yesterday due to transport error.  Patient went for dialysis today and then after dialysis was noted to be confused with a temperature of 105.  Due to that he was sent to our ER.  Here in the ER he has a fever of 103 which is come down with Tylenol.  He has a white count of 21.  Blood pressure is stable.  Urinalysis concerning for possible infection.  I saw him in ER 40 with family at bedside.  He was asleep but easily awoken.  He is interactive.  He is mildly confused but appears to be answering questions appropriately.  He knew where he was but did not know the month or the year.  He says he feels okay at this time.  Denies any cough or phlegm.  Denies any chest pain or shortness of breath.  No abdominal pain or nausea.  No new focal numbness or weakness.  Urine and blood cultures have been sent from the ER.  He has been given a dose of ceftriaxone.  We have been asked admit for further care   7/6  As before history of end-stage renal disease on dialysis history of peripheral vascular disease coronary artery disease that is post CABG presented with sepsis confusion felt secondary to metabolic encephalopathy was found to have urosepsis blood culture is pending so far noted on Rocephin cardiac enzymes were elevated cardiology has been consulted his last  echo on January 2025 was showing EF to be 51%  7/7  As before admitted for sepsis blood culture remains unremarkable urine culture unremarkable continue current antibiotics going for dialysis appreciate nephrology appreciate cardiology Elevated troponin felt to be secondary to comorbidities (ESRD, vascular disease, anemia) and UTI. Plan for ischemic eval outpatient. Continue ASA, Plavix, lipitor, coreg, and isordil   Review of Systems   All other systems reviewed and are negative.     All pertinent negatives and positives are as above. All other systems have been reviewed and are negative unless otherwise stated.     Objective    Temp:  [97.9 °F (36.6 °C)-99.1 °F (37.3 °C)] 98.1 °F (36.7 °C)  Heart Rate:  [65-87] 83  Resp:  [16-18] 16  BP: (116-149)/(40-60) 149/60  Physical Exam  HENT:      Head: Normocephalic.      Nose: Nose normal.   Eyes:      Pupils: Pupils are equal, round, and reactive to light.   Cardiovascular:      Rate and Rhythm: Normal rate.   Pulmonary:      Effort: Pulmonary effort is normal.   Abdominal:      General: Abdomen is flat.   Musculoskeletal:      Cervical back: Normal range of motion.      Comments: R aka    Skin:     Capillary Refill: Capillary refill takes less than 2 seconds.   Neurological:      Mental Status: He is alert.           Results Review:  I have reviewed the labs, radiology results, and diagnostic studies.    Laboratory Data:   Results from last 7 days   Lab Units 07/07/25  0410 07/06/25  0537 07/05/25  1756   WBC 10*3/mm3 16.96* 22.70* 20.93*   HEMOGLOBIN g/dL 8.6* 8.7* 7.7*   HEMATOCRIT % 27.0* 27.4* 23.4*   PLATELETS 10*3/mm3 157 159 170        Results from last 7 days   Lab Units 07/07/25  0410 07/06/25  0537 07/05/25  1756   SODIUM mmol/L 134* 132* 135*   POTASSIUM mmol/L 3.9 3.7 3.7   CHLORIDE mmol/L 100 97* 99   CO2 mmol/L 22.0 25.0 24.0   BUN mg/dL 42.9* 33.9* 29.6*   CREATININE mg/dL 3.10* 2.47* 2.02*   CALCIUM mg/dL 9.0 8.5* 8.4*   BILIRUBIN mg/dL 0.2 0.4 0.3  "  ALK PHOS U/L 268* 312* 322*   ALT (SGPT) U/L 29 48* 39   AST (SGOT) U/L 23 51* 46*   GLUCOSE mg/dL 96 94 103*       Culture Data:   No results found for: \"BLOODCX\", \"URINECX\", \"WOUNDCX\", \"MRSACX\", \"RESPCX\", \"STOOLCX\"    Radiology Data:   Imaging Results (Last 24 Hours)       ** No results found for the last 24 hours. **            I have reviewed the patient's current medications.     Assessment/Plan   Assessment  Active Hospital Problems    Diagnosis     **Sepsis     Elevated troponin     Coronary artery disease involving native coronary artery of native heart without angina pectoris     ESRD (end stage renal disease) on dialysis     Chronic diastolic (congestive) heart failure     Anemia due to chronic kidney disease     Peripheral arterial disease     IHD (ischemic heart disease)     Essential hypertension        Treatment Plan  #1 sepsis -patient presenting with altered mental status and a fever of 103.  WBC of 20K.  Urinalysis seems abnormal and possibly indicative of UTI but this is a dialysis patient and potentially may have chronic abnormal urine.  Fever did seem to occur after access of his dialysis catheter.  Other differential would be a catheter line infection but there is no obvious erythema or loculation at catheter site.  Urine and blood cultures have been sent.  Continue ceftriaxone 2 g every 24 hours and monitor cultures.  Currently pressure is stable, given he is a dialysis patient with a history of diastolic CHF will avoid fluid bolus at this time.     #2 urinalysis -likely UTI given concern for sepsis and mental status changes.  Continue ceftriaxone.  Follow cultures.     #3 confusion/altered mental status -suspect metabolic encephalopathy in the setting of #1 and 2 above.  Treat underlying infections and monitor.     #4 NSTEMI/CAD -patient is a known vasculopath with prior CABG at least 20 years ago per report.  Do not see any recent ischemic evaluation.  Had a troponin of 147 on arrival which " increased to 176.  He is a dialysis patient which could lead to elevated labs.  He is denying any chest pain.  That being said he has EKG changes showing lateral ST-T segment depressions in 1 as well as V4 through 6.  Note he had some similar changes in V4 through V6 back on his EKG in January but not to the extent of current depressions.  Again he is chest pain-free.  Trend troponin.  Recheck another EKG at next troponin check.  Cardiology was called from the ER and aware of patient.  Continue beta-blocker, Imdur, aspirin, statin, Plavix.  DVT prophylaxis heparin.  Discussed with Dr. Raines.     #5 ESRD -next dialysis is due Monday 7/7.  Will consult nephrology tomorrow to help prepare for planned dialysis session.  Monitor blood cultures given concern of fever and events after catheter access.  Catheter line infection remains in differential.     #6 anemia of chronic disease -H&H similar to last hospitalization.  Patient did require some PRBC transfusion last hospital stay.  Monitor closely.  Continue oral iron.     #7 chronic diastolic CHF -currently appears euvolemic without signs of overload.  Status post dialysis earlier today.  Monitor.     #8 history of constipation -bowel regimen        Medical Decision Making  Number and Complexity of problems: 3-4 acute, multiple chronic     Differential Diagnosis: as above     Conditions and Status        New to me this stay, septic and being admitted to hospital for further care     MDM Data  External documents reviewed: none  Cardiac tracing (EKG, telemetry) interpretation: sinus with some lateral st segment depressions  Radiology interpretation: reports reviewed  Labs reviewed: as above  Any tests that were considered but not ordered: none     Decision rules/scores evaluated (example [id number], Wells, etc): none     Discussed with: patient, family, ER provider, Dr. Raines     Care Planning  Shared decision making: Patient apprised of current labs, vitals,  imaging and treatment plan.  They are agreeable with proceeding with plans as discussed.     Code status and discussions: full code     Disposition  Social Determinants of Health that impact treatment or disposition: none  Estimated length of stay is 2+ days.      I confirmed that the patient's advanced care plan is present, code status is documented, and a surrogate decision maker is listed in the patient's medical record.      The patient's surrogate decision maker is his daughter Kathleen       Electronically signed by Vidal Miramontes MD, 07/07/25, 10:26 CDT.

## 2025-07-07 NOTE — PROGRESS NOTES
Nephrology ([facility]) Progress Note      Patient:  Ricardo Hugo  YOB: 1950  Date of Service: 7/7/2025  MRN: [id number]   Acct: [id number]   Primary Care Physician: Nathan Zimmer MD  Advance Directive:   Code Status and Medical Interventions: CPR (Attempt to Resuscitate); Full Support   Ordered at: 07/05/25 2124     Code Status (Patient has no pulse and is not breathing):    CPR (Attempt to Resuscitate)     Medical Interventions (Patient has pulse or is breathing):    Full Support     Level Of Support Discussed With:    Patient     Admit Date: 7/5/2025       Hospital Day: 2  Referring Provider: No Known Provider      Patient personally seen and examined.  Complete chart including Consults, Notes, Operative Reports, Labs, Cardiology, and Radiology studies reviewed as able.        Subjective:  Ricardo Hugo is a 75 y.o. male for whom we were consulted for evaluation and treatment of end stage renal disease on hemodialysis Monday Wednesday Friday. History of diastolic CHF, CAD with prior CABG, hypertension. Recent admission to [facility] and had right AKA. Discharged to rehab facility on 7/03.   Returned to ER on 7/05 due to findings of confusion and fever of 105 while at outpatient dialysis clinic. Denied cough, abdominal pain, dysuria. Some confusion on initial nephrology exam.     Today is awake and alert. Seen during dialysis and tolerating well. No new complaints.   Dialysis   Patient was seen and evaluated on renal replacement therapy and I have personally evaluated the patient and directed the therapy  Modality: Hemodialysis  Access: Catheter  Location: right upper  QB: 450  QD: 700  UF: 1500    Allergies:  Ondansetron, Zofran [ondansetron hcl], Lortab [hydrocodone-acetaminophen], and Allopurinol    Home Meds:  Medications Prior to Admission   Medication Sig Dispense Refill Last Dose/Taking    aspirin 81 MG chewable tablet Chew 1 tablet Daily.   Taking     atorvastatin (LIPITOR) 10 MG tablet Take 1 tablet by mouth Daily. 90 tablet 3 Taking    Budeson-Glycopyrrol-Formoterol (Breztri Aerosphere) 160-9-4.8 MCG/ACT aerosol inhaler Inhale 2 puffs 2 (Two) Times a Day.   Taking    calcitriol (ROCALTROL) 0.5 MCG capsule Take 1 capsule by mouth Daily. 90 capsule 2 Taking    carvedilol (COREG) 12.5 MG tablet Take 1 tablet by mouth 2 (Two) Times a Day With Meals.   Taking    clopidogrel (PLAVIX) 75 MG tablet Take 1 tablet by mouth Daily.   Taking    empagliflozin (Jardiance) 10 MG tablet tablet Take 1 tablet by mouth Daily.   Taking    esomeprazole (nexIUM) 20 MG capsule Take 1 capsule by mouth Every Morning Before Breakfast.   Taking    gabapentin (NEURONTIN) 300 MG capsule Take 1 capsule by mouth Every Night for 24 doses.   Taking    hydrALAZINE (APRESOLINE) 100 MG tablet Take 0.5 tablets by mouth 3 (Three) Times a Day.   Taking    iron polysaccharides (NIFEREX) 150 MG capsule Take 1 capsule by mouth Daily.   Taking    isosorbide dinitrate (ISORDIL) 10 MG tablet Take 1 tablet by mouth 3 (Three) Times a Day. 270 tablet 2 Taking    levothyroxine (Synthroid) 100 MCG tablet Take 1 tablet by mouth Every Morning. 90 tablet 2 Taking    magnesium chloride-calcium (SLOW MAGNESIUM/CALCIUM)  MG tablet delayed-release ec tablet Take 1 tablet by mouth Daily.   Taking    montelukast (SINGULAIR) 10 MG tablet Take 1 tablet by mouth Every Night.   Taking    Multiple Vitamins-Minerals (PRESERVISION/LUTEIN) capsule Take 1 capsule by mouth 2 (two) times a day.   Taking    NIFEdipine XL (ADALAT CC) 60 MG 24 hr tablet Take 2 tablets by mouth Daily.   Taking    oxyCODONE-acetaminophen (PERCOCET) 5-325 MG per tablet Take 1 tablet by mouth Every 6 (Six) Hours As Needed for Moderate Pain for up to 24 doses. 24 tablet 0 7/5/2025    polyethylene glycol (MIRALAX) 17 g packet Take 17 g by mouth Daily. For constipation   Taking    tamsulosin (FLOMAX) 0.4 MG capsule 24 hr capsule Take 1 capsule by  mouth Every Night.   Taking    valsartan (DIOVAN) 320 MG tablet Take 1 tablet by mouth Daily.   Taking    vitamin D (ERGOCALCIFEROL) 1.25 MG (43167 UT) capsule capsule Take 1 capsule by mouth 1 (One) Time Per Week. Mondays   Taking    acetaminophen (TYLENOL) 325 MG tablet Take 2 tablets by mouth Every 4 (Four) Hours As Needed for Mild Pain.   Unknown    albuterol sulfate  (90 Base) MCG/ACT inhaler Inhale 2 puffs Every 6 (Six) Hours As Needed for Wheezing or Shortness of Air.   Unknown    bisacodyl (DULCOLAX) 10 MG suppository Insert 1 suppository into the rectum Daily As Needed for Constipation.   Unknown    desoximetasone (TOPICORT) 0.25 % cream Apply 1 Application topically to the appropriate area as directed 2 (Two) Times a Day As Needed for Irritation. irritation 60 g 0     docusate sodium (COLACE) 100 MG capsule Take 1 capsule by mouth 3 (Three) Times a Day As Needed for Constipation.   Unknown    fluticasone (FLONASE) 50 MCG/ACT nasal spray Administer 2 sprays into the nostril(s) as directed by provider Daily As Needed for Allergies.   Unknown    naloxone (NARCAN) 4 MG/0.1ML nasal spray Call 911. Don't prime. [place] in 1 nostril for overdose. Repeat in 2-3 minutes in other nostril if no or minimal breathing/responsiveness. 2 each 0 Unknown    senna 8.6 MG tablet Take 2 tablets by mouth Daily As Needed for Constipation.   Unknown       Medicines:  Current Facility-Administered Medications   Medication Dose Route Frequency Provider Last Rate Last Admin    acetaminophen (TYLENOL) tablet 650 mg  650 mg Oral Q4H PRN Dc Issa DO        albuterol (ACCUNEB) nebulizer solution 0.63 mg  0.63 mg Nebulization Q6H PRN Vidla Miramontes MD        aspirin EC tablet 81 mg  81 mg Oral Daily Dc Issa DO   81 mg at 07/06/25 0848    atorvastatin (LIPITOR) tablet 10 mg  10 mg Oral Daily Dc Issa DO   10 mg at 07/06/25 0848    sennosides-docusate (PERICOLACE) 8.6-50 MG per tablet 2 tablet  2 tablet  Oral BID Dc Issa DO   2 tablet at 07/06/25 0848    And    polyethylene glycol (MIRALAX) packet 17 g  17 g Oral Daily PRN Dc Issa DO        And    bisacodyl (DULCOLAX) EC tablet 5 mg  5 mg Oral Daily PRN Dc Issa DO        And    bisacodyl (DULCOLAX) suppository 10 mg  10 mg Rectal Daily PRN Dc Issa DO        budesonide-formoterol (SYMBICORT) 160-4.5 MCG/ACT inhaler 2 puff  2 puff Inhalation BID - RT Dc Issa DO   2 puff at 07/07/25 0720    And    ipratropium (ATROVENT) nebulizer solution 0.5 mg  0.5 mg Nebulization 4x Daily - RT Dc Issa DO   0.5 mg at 07/07/25 0716    calcitriol (ROCALTROL) capsule 0.5 mcg  0.5 mcg Oral Daily Dc Issa DO   0.5 mcg at 07/06/25 0849    carvedilol (COREG) tablet 6.25 mg  6.25 mg Oral BID With Meals Dc Issa DO   6.25 mg at 07/06/25 0849    cefTRIAXone (ROCEPHIN) 2,000 mg in sodium chloride 0.9 % 100 mL MBP  2,000 mg Intravenous Q24H Dc Issa  mL/hr at 07/06/25 1735 2,000 mg at 07/06/25 1735    clopidogrel (PLAVIX) tablet 75 mg  75 mg Oral Daily Dc Issa DO   75 mg at 07/06/25 0849    empagliflozin (JARDIANCE) tablet 10 mg  10 mg Oral Daily Vidal Miramontes MD   10 mg at 07/06/25 1225    epoetin cecile-epbx (RETACRIT) injection 10,000 Units  10,000 Units Subcutaneous Once per day on Monday Wednesday Friday Giuliano Saldana MD        fluticasone (FLONASE) 50 MCG/ACT nasal spray 2 spray  2 spray Nasal Daily PRN Vidal Miramontes MD        gabapentin (NEURONTIN) capsule 300 mg  300 mg Oral Nightly Dc Issa DO   300 mg at 07/06/25 2018    heparin (porcine) 5000 UNIT/ML injection 5,000 Units  5,000 Units Subcutaneous Q12H Dc Issa DO   5,000 Units at 07/06/25 2018    hydrALAZINE (APRESOLINE) tablet 50 mg  50 mg Oral TID Vidal Miramontes MD        iron polysaccharides (NIFEREX) capsule 150 mg  150 mg Oral Daily Dc Issa DO   150 mg at 07/06/25 0848    isosorbide dinitrate  (ISORDIL) tablet 10 mg  10 mg Oral TID - Nitrates Dc Issa DO   10 mg at 07/06/25 1733    levothyroxine (SYNTHROID, LEVOTHROID) tablet 100 mcg  100 mcg Oral Q AM Dc Issa DO   100 mcg at 07/07/25 0510    montelukast (SINGULAIR) tablet 10 mg  10 mg Oral Nightly Dc Issa DO   10 mg at 07/06/25 2018    NIFEdipine XL (PROCARDIA XL) 24 hr tablet 120 mg  120 mg Oral Daily Vidal Miramontes MD   120 mg at 07/06/25 1225    oxyCODONE-acetaminophen (PERCOCET) 5-325 MG per tablet 1 tablet  1 tablet Oral Q6H PRN Dc Issa DO   1 tablet at 07/07/25 0913    pantoprazole (PROTONIX) EC tablet 40 mg  40 mg Oral Q AM Dc Issa DO   40 mg at 07/07/25 0510    sodium chloride 0.9 % flush 10 mL  10 mL Intravenous Q12H Dc Issa DO   10 mL at 07/06/25 2019    sodium chloride 0.9 % flush 10 mL  10 mL Intravenous PRN Dc Issa DO        sodium chloride 0.9 % infusion 40 mL  40 mL Intravenous PRN Dc Issa DO        tamsulosin (FLOMAX) 24 hr capsule 0.4 mg  0.4 mg Oral Nightly Vidal Miramontes MD   0.4 mg at 07/06/25 2018    valsartan (DIOVAN) tablet 320 mg  320 mg Oral Daily Vidal Miramnotes MD           Past Medical History:  Past Medical History:   Diagnosis Date    3-vessel CAD 08/11/2020    Allergic rhinitis     Anemia     Anxiety disorder 04/27/2020    Arthritis     Asymmetrical sensorineural hearing loss 06/28/2017    Atherosclerosis of native artery of both lower extremities with intermittent claudication 07/18/2019    Avascular necrosis of femoral head, left 07/11/2020    right hip after surgery    Carotid stenosis     Chronic mucoid otitis media     Chronic rhinitis     COPD (chronic obstructive pulmonary disease)     Coronary artery disease     HEART BYPASS 2004    Crohn's disease of large intestine with other complication 07/30/2020    Chronic diarrhea Colonoscopy July 2020 revealed mild patchy scattered hemosiderin staining with inflammation more so in rectosigmoid  area.  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    Iron deficiency anemia 07/14/2020    Mixed hearing loss of left ear     PAD (peripheral artery disease) 08/11/2020    Perianal abscess     Pernicious anemia 08/17/2020    took shots but never diagnosed with b12 deficiency    Personal history of alcoholism 08/11/2020    quit drinking in 2013    Prostatic hypertrophy 08/11/2020    Sensorineural hearing loss     Sepsis with acute renal failure 09/15/2020    Shortness of breath 05/27/2021    Tinnitus     Ventricular tachycardia, nonsustained 07/14/2020    Weight loss 07/11/2020       Past Surgical History:  Past Surgical History:   Procedure Laterality Date    ABOVE KNEE AMPUTATION Right 6/24/2025    Procedure: right above knee amputation;  Surgeon: Blayne Zabala MD;  Location:  [facility] OR;  Service: Vascular;  Laterality: Right;    AORTOGRAM Right 4/25/2025    Procedure: RIGHT LOWER EXTREMITY ANGIOGRAM, SHOCKWAVE LITHOTRIPSY, BALLOON ANGIOPLASTY, MYNX CLOSURE;  Surgeon: Gil Pineda DO;  Location:  [facility] HYBRID OR;  Service: Vascular;  Laterality: Right;    AORTOGRAM Left 5/22/2025    Procedure: LEFT LOWER EXTREMITY ANGIOGRAM, SHOCKWAVE LITHOTRIPSY, BALLOON ANGIOPLASTY, STENT PLACEMENT, MYNX CLOSURE;  Surgeon: Blayne Zabala MD;  Location:  [facility] HYBRID OR;  Service: Vascular;  Laterality: Left;    AORTOGRAM Right 5/30/2025    Procedure: AORTOILIAC ANGIOGRAM WITH LEFT LOWER EXTREMITY ANGIOGRAM;  Surgeon: Gil Pineda DO;  Location:  [facility] HYBRID OR;  Service: Vascular;  Laterality: Right;    AORTOGRAM Right 6/20/2025    Procedure: right lower  extremity arteriogram, shockwave lithotripsy, balloon angioplasty, mynx closue;  Surgeon: Blayne Zabala MD;  Location: [facility] HYBRID OR;  Service: Vascular;  Laterality: Right;    ARTERY SURGERY  2021    right side on neck    CAROTID ENDARTERECTOMY Right 05/10/2021    Procedure: RIGHT CAROTID ENDARTERECTOMY WITH EEG;  Surgeon: Gil Pineda DO;  Location: [facility] HYBRID OR 12;  Service: Vascular;  Laterality: Right;    COLONOSCOPY N/A 07/02/2020    Procedure: COLONOSCOPY WITH ANESTHESIA;  Surgeon: Adrien Brewster MD;  Location: [facility] ENDOSCOPY;  Service: Gastroenterology;  Laterality: N/A;  pre op: diarrhea  post op: polyps  PCP: Joe Velasco MD    COLONOSCOPY N/A 10/13/2020    Procedure: COLONOSCOPY WITH ANESTHESIA;  Surgeon: Adrien Brewster MD;  Location: [facility] ENDOSCOPY;  Service: Gastroenterology;  Laterality: N/A;  Pre: Chronic Diarrhea, Crohn's  Post: AVM  Dr. Neftali Velasco  CO2 Inflation Used    COLONOSCOPY N/A 12/08/2023    Procedure: COLONOSCOPY WITH ANESTHESIA;  Surgeon: Ardien Brewster MD;  Location: [facility] ENDOSCOPY;  Service: Gastroenterology;  Laterality: N/A;  pre chrone's disease  post sub optimal prep, polyp, chrone's      CORONARY ARTERY BYPASS GRAFT  2003    x3    ENDOSCOPY N/A 11/02/2021    Procedure: ESOPHAGOGASTRODUODENOSCOPY WITH ANESTHESIA;  Surgeon: Bridger Bell MD;  Location: [facility] ENDOSCOPY;  Service: Gastroenterology;  Laterality: N/A;  pre anemia;gi bleed  post  gi bleed;schatski ring  Dr. ERIC Velasco    ENDOSCOPY N/A 10/10/2023    Procedure: ESOPHAGOGASTRODUODENOSCOPY WITH ANESTHESIA;  Surgeon: Adrien Brewster MD;  Location: [facility] ENDOSCOPY;  Service: Gastroenterology;  Laterality: N/A;  preop; anemia  postop esophagitis ; R/O barretts   PCP Randall Beata    EYE SURGERY Bilateral     catorac    INCISION AND DRAINAGE PERIRECTAL ABSCESS N/A 03/03/2017    Procedure: INCISION AND DRAINAGE OF JEET ANAL ABSCESS;  Surgeon: Lynette Smith MD;  Location:   [facility] OR;  Service:     INGUINAL HERNIA REPAIR Bilateral 2023    Procedure: INGUINAL HERNIA BILATERAL REPAIR LAPAROSCOPIC WITH DAVINCI ROBOT WITH MESH;  Surgeon: Tahira Rivera MD;  Location:  [facility] OR;  Service: Robotics - DaVinci;  Laterality: Bilateral;    INSERTION HEMODIALYSIS CATHETER N/A 2025    Procedure: HEMODIALYSIS CATHETER PLACEMENT;  Surgeon: Gil Pineda DO;  Location:  [facility] HYBRID OR;  Service: Vascular;  Laterality: N/A;    MYRINGOTOMY W/ TUBES Left 2017    06/10/2016    TONSILLECTOMY      TOTAL HIP ARTHROPLASTY Right        Family History  Family History   Problem Relation Age of Onset    Breast cancer Mother     Dementia Father     Glaucoma Father     No Known Problems Daughter     Colon polyps Neg Hx     Colon cancer Neg Hx        Social History  Social History     Socioeconomic History    Marital status:    Tobacco Use    Smoking status: Former     Current packs/day: 0.00     Average packs/day: 0.5 packs/day for 25.8 years (12.9 ttl pk-yrs)     Types: Cigarettes     Start date:      Quit date: 10/13/2013     Years since quittin.7     Passive exposure: Past    Smokeless tobacco: Never    Tobacco comments:     quit 2013   Vaping Use    Vaping status: Former    Substances: Nicotine    Devices: Pre-filled or refillable cartridge   Substance and Sexual Activity    Alcohol use: Not Currently    Drug use: No    Sexual activity: Not Currently     Partners: Female       Review of Systems:  History obtained from chart review and the patient  General ROS: No fever or chills  Respiratory ROS: No cough, shortness of breath, wheezing  Cardiovascular ROS: No chest pain or palpitations  Gastrointestinal ROS: No abdominal pain or melena  Genito-Urinary ROS: No dysuria or hematuria  Psych ROS: No anxiety and depression  14 point ROS reviewed with the patient and negative except as noted above and in the HPI unless unable to obtain.    Objective:  Patient Vitals  for the past 24 hrs:   BP Temp Temp src Pulse Resp SpO2 Weight   07/07/25 0720 -- -- -- 83 16 100 % --   07/07/25 0716 -- -- -- 87 16 97 % --   07/07/25 0600 149/60 98.1 °F (36.7 °C) Oral 70 16 95 % 56 kg (123 lb 7.3 oz)   07/06/25 1940 119/45 98.3 °F (36.8 °C) Oral 79 18 94 % --   07/06/25 1926 -- -- -- 71 18 100 % --   07/06/25 1920 -- -- -- 69 18 96 % --   07/06/25 1616 120/45 97.9 °F (36.6 °C) Oral 75 18 95 % --   07/06/25 1352 -- -- -- 72 18 99 % --   07/06/25 1346 -- -- -- 65 18 99 % --   07/06/25 1128 116/40 99.1 °F (37.3 °C) Oral 67 18 96 % --   07/06/25 1014 -- -- -- 69 16 95 % --   07/06/25 1008 -- -- -- 69 16 95 % --       Intake/Output Summary (Last 24 hours) at 7/7/2025 0923  Last data filed at 7/7/2025 0600  Gross per 24 hour   Intake --   Output 1200 ml   Net -1200 ml     General: awake/alert   Chest:  clear to auscultation bilaterally without respiratory distress  CVS: regular rate and rhythm  Abdominal: soft, nontender, positive bowel sounds  Extremities: right AKA and no cyanosis or edema  Skin: warm and dry without rash      Labs:  Results from last 7 days   Lab Units 07/07/25 0410 07/06/25  0537 07/05/25  1756   WBC 10*3/mm3 16.96* 22.70* 20.93*   HEMOGLOBIN g/dL 8.6* 8.7* 7.7*   HEMATOCRIT % 27.0* 27.4* 23.4*   PLATELETS 10*3/mm3 157 159 170         Results from last 7 days   Lab Units 07/07/25 0410 07/06/25  0537 07/05/25  1756   SODIUM mmol/L 134* 132* 135*   POTASSIUM mmol/L 3.9 3.7 3.7   CHLORIDE mmol/L 100 97* 99   CO2 mmol/L 22.0 25.0 24.0   BUN mg/dL 42.9* 33.9* 29.6*   CREATININE mg/dL 3.10* 2.47* 2.02*   CALCIUM mg/dL 9.0 8.5* 8.4*   EGFR mL/min/1.73 19.9* 26.2* 33.3*   BILIRUBIN mg/dL 0.2 0.4 0.3   ALK PHOS U/L 268* 312* 322*   ALT (SGPT) U/L 29 48* 39   AST (SGOT) U/L 23 51* 46*   GLUCOSE mg/dL 96 94 103*       Radiology:   Imaging Results (Last 72 Hours)       Procedure Component Value Units Date/Time    XR Chest 1 View [[id number]] Collected: 07/05/25 1722     Updated: 07/05/25  1729    Narrative:      EXAMINATION: XR CHEST 1 VW- 7/5/2025 5:23 PM     HISTORY: Fever.     REPORT: Frontal view of the chest was obtained.     COMPARISON: Chest x-ray 6/19/2025.     There is interval improvement, near resolution of interstitial  infiltrates in the right lung, no lung consolidation or focal infiltrate  is identified. The left lung remains clear. The right internal jugular  central venous catheter appears in good position as before. Previous  median sternotomy and CABG is noted. Heart size appears normal, no  evidence of overt CHF is identified. The osseous structures are acutely  unremarkable. Mixed sclerotic/lytic changes in the right humeral head  appear similar, may be associated with osteonecrosis.       Impression:      Partial resolution of interstitial infiltrates in the right  lung, mild residual right-sided interstitial pneumonitis or mild  asymmetric pulmonary edema may be present. No lung consolidation or  acute focal infiltrate. Previous CABG. The right internal jugular  central venous catheter remains in good position.     This report was signed and finalized on 7/5/2025 5:26 PM by Dr. Ash Seay MD.               Culture:  Blood Culture   Date Value Ref Range Status   07/05/2025 No growth at 24 hours  Preliminary   07/05/2025 No growth at 24 hours  Preliminary     Urine Culture   Date Value Ref Range Status   07/05/2025 Culture in progress  Preliminary         Assessment    End stage renal disease on HD MWF  Hypertension  Recent right AKA  Sepsis   Anemia of CKD  Chronic diastolic CHF  Hyponatremia     Plan:   Routine HD today  Monitor labs      Slava Tate, STERLING  7/7/2025  09:23 CDT

## 2025-07-07 NOTE — CASE MANAGEMENT/SOCIAL WORK
Discharge Planning Assessment  [facility]     Patient Name: Ricardo Hugo  MRN: [id number]  Today's Date: 7/7/2025    Admit Date: 7/5/2025        Discharge Needs Assessment       Row Name 07/07/25 1444       Living Environment    People in Home alone    Current Living Arrangements home    Potentially Unsafe Housing Conditions none    In the past 12 months has the electric, gas, oil, or water company threatened to shut off services in your home? No    Primary Care Provided by self    Provides Primary Care For no one    Family Caregiver if Needed child(jamshid), adult    Quality of Family Relationships helpful;involved;supportive    Able to Return to Prior Arrangements yes       Resource/Environmental Concerns    Resource/Environmental Concerns none    Transportation Concerns none       Transportation Needs    In the past 12 months, has lack of transportation kept you from medical appointments or from getting medications? no    In the past 12 months, has lack of transportation kept you from meetings, work, or from getting things needed for daily living? No       Food Insecurity    Within the past 12 months, you worried that your food would run out before you got the money to buy more. Never true    Within the past 12 months, the food you bought just didn't last and you didn't have money to get more. Never true       Transition Planning    Patient/Family Anticipates Transition to home    Patient/Family Anticipated Services at Transition none    Transportation Anticipated family or friend will provide;car, drives self       Discharge Needs Assessment    Equipment Currently Used at Home none    Do you want help finding or keeping work or a job? I do not need or want help    Do you want help with school or training? For example, starting or completing job training or getting a high school diploma, GED or equivalent No    Anticipated Changes Related to Illness none    Equipment Needed After Discharge none    Provided Post  Acute Provider List? N/A    Provided Post Acute Provider Quality & Resource List? N/A    Discharge Coordination/Progress Lives at home alone. Daughter lives nearby and assists as needed. drives self but daughter can provide transportation if need be. Has PCP and Rx coverage. No D/C needs identified at this time.                   Discharge Plan    No documentation.                 Continued Care and Services - Admitted Since 7/5/2025    No active coordination exists.       Selected Continued Care - Episodes Includes continued care and service providers with selected services from the active episodes listed below      High Risk Care Management Episode start date: 6/6/2025   There are no active outsourced providers for this episode.                 Selected Continued Care - Prior Encounters Includes continued care and service providers with selected services from prior encounters from 4/6/2025 to 7/7/2025      Discharged on 7/3/2025 Admission date: 6/19/2025 - Discharge disposition: Skilled Nursing Facility (DC - External)      Destination       Service Provider Services Address Phone Fax Patient Preferred    [facility] [address] [place] [address] [phone] --                      Discharged on 6/5/2025 Admission date: 5/21/2025 - Discharge disposition: Skilled Nursing Facility (DC - External)      Destination       Service Provider Services Address Phone Fax Patient Preferred    [facility] [address] [place] [address] [id number] [phone] --                             Demographic Summary    No documentation.                  Functional Status    No documentation.                  Psychosocial    No documentation.                  Abuse/Neglect    No documentation.                  Legal    No documentation.                  Substance Abuse    No documentation.                  Patient  Forms    No documentation.                     Adrien Hernandez RN

## 2025-07-07 NOTE — PLAN OF CARE
Goal Outcome Evaluation:              Outcome Evaluation: ALOX4. RA. incont. brief. no fever. dialysis cath looks CDI. NSR on tele. possible dialysis today.

## 2025-07-07 NOTE — PLAN OF CARE
Goal Outcome Evaluation:              Outcome Evaluation: Patient is alert and oriented x 4, on RA. Dialysis today, reports 1500ml off. Complaints of left heel pain. Call light within reach, safety maintained.

## 2025-07-07 NOTE — NURSING NOTE
[facility] [place], [place] INPATIENT SERVICES  DIALYSIS TREATMENT SUMMARY     Note: Consult with the attending physician for patient treatment orders, this document is not a physician order.     Patient Information  Patient Ricardo Hugo  Date of Birth February 22, 1950  Chart Number [id number]  Location [facility]  Location MRN [id number]  Gender Male  SSN (last 4)   Treatment Information  Treatment Type Hemodialysis  Treatment Id [id number]  Start Time July 07, 2025 08:20  End Time July 07, 2025 11:50  Acutal Duration 03:30  Treatment Balances  Total Saline Administered 500  Net Fluid Balance -1500  Hemodialysis Orders  Therapy Standard  Orders Verified Time 07/07/2025 07:18   Date Verified 07/07/2025  Duration 3:30  Isolated UF/Bypass No  BFR (mL) 450  DFR (mL) 700  Dialyzer Type OPTIFLUX 160NR  UF Order UF Range  UF Range (mL) 500 - 1500  With BP Parameters Yes  As Tolerated Yes  Crit-Line used No  Heparin Initial Units Bolus No  Heparin IV Maintenance Bolus No  Heparin IV Infusion No  Potassium (mEq/L) 3  Calcium (mEq/L) 2.5  Bicarb (mg/dL) 35  Sodium (mEq/L) 140  Clinician Ginny Cancino RN  AV Access  Access Type Central Venous Catheter  Central Venous Catheter  Access Type Catheter - Tunneled  Access Location Chest Wall - R  Current/New [facility] Patient Yes  1st Use Catheter Verified by Previous Use  Catheter Care Completed per Policy Yes  Dressing Dry and Intact on Arrival Yes  Dressing Changed No  Type of Dressing Film Biopatch/CHG  Last Date Changed 07/02/2025  If No select Reason Dressing Change Not Indicated per Policy  Type of HD Caps Curos  HD Caps Changed Yes  CVC Line Education Provided Yes - Dressing Change Frequency  Vitals  Pre-Treatment Vitals  Time Is BP being recorded? Pre BP Map BP Method Pulse RR Temp How was Weight Obtained? Pre Weight Previous Dry Weight Previous Post Weight Metric Target Fluid Removal (mL) Dialysate Confirmed Clinician  07/07/2025  08:14 BP/Map 149/66 (94) Noninvasive 58 18 98.1 How Obtained: Reported Floor Weight 56   Kgs 1500 Yes Ginny Cancino RN  Comments: Received pt from floor in stable condition via transporters  Intra Procedure Vitals  Rec Type Time Is BP being recorded? BP Map Pulse RR BFR DFR AP  TMP UFR RMVD Bolus NSS Flush Chills Safety Check Crit-Line HCT Crit-Line BV% Clinician  E 07/07/2025 11:50 BP/Map 115/50 (72) 60 18           Yes   Ginny Cancino RN  Comments: HD completed, rinseback initiated  Post Treatment Vitals  Time Is BP being recorded? BP Map Pulse RR Temp How was Weight Obtained? Post Weight Metric BVP UF Goal Ordered NSS Given Intra-Procedure Total Machine UF Removed (mL) Crit-Line Ending Profile Crit-Line Refill Crit-Line Ending HCT Crit-Line Max BV% Clinician  07/07/2025 12:05 BP/Map 139/51 (80) 57 18 98 How Obtained: Reported Floor Weight 54.5 Kgs 82.3 1500 0 2000     Ginny Cancino RN  Comments: Ports flushed and capped  Safety checks include: access uncovered and secured, Hemaclip secured for all central line access, machine checks performed, and alarm limits confirmed.  Labs  Hepatitis  HBsAG Lab Result HBsAG Lab Result HBsAG Draw Date Transient Antigenemia(MD Diagnosis Only) Anti-HBs Lab Result Anti-HBs Lab Value Anti-HBs Draw Date Documented By Documented Date Hepatitis Status Hepatitis Status  Negative  07/06/2025  Negative  07/06/2025 Ginny Cancino 07/07/2025  Susceptible  Notes:   Pre-Treatment Hepatitis Precautions Copy of hepatitis results verified in hospital EMR Yes Signed By Ginny Cancino RN  Patient tx outside buffer zone Yes Hepatitis Information Entered By Ginny Cancino RN   Hard copy verified by nurse and placed in patient’s hospital chart Yes Signing   Pre-Treatment Handoff  Staff Report Received Yes  Report Given by Primary Nurse Tony Foley  Time 07:20  Patient Arrival  Patient ID Verified Date of Birth  Full Name  Patient Consent to treatment verified Yes  Blood Transfusion  Consent Verified N/A  Treatment Comments  Treatment Notes Tolerated treatment well, transported back to pt room in stable condition via transport  Post-Treatment Handoff  Report Given to Primary Nurse Clifford Josue RN  Time Report Given 12:07  Report Given By Ginny Cancino RN  Machine Validation  Time 07:30  Date 7/7/2025  Auto Alarm Test Passed Yes  Machine Serial # [id number]  Portable RO Yes  RO Serial# 17  Residual Bleach Negative N/A  Was a manufactured mix used? Yes  Machine Log Completed Yes  Total Chlorine (less than 0.1)? Yes  Total Chlorine Log Completed Yes  Bicarb BiBag  Bibag Size 900  Machine Temp 36.6  Machine Conductivity 13.8  Meter Type N/A  Meter Conductivity   Independent Conductivity 14  pH Status Pass Pass  pH   TCD Value   TCD Alarm with +/- 0.5 Yes  NVL enabled validated 100 asymmetric Yes  Safety check complete Ginny Cancino RN  Second Verification Performed? Yes  Second Verification Performed By Ginny Cancino RN  Reason Not Verified   Serum Lab Values  Time BUN Creatinine Na K (mEq/L) Cl CO2 Ca (mEq/L) Phos Mg (mg/dL) Alb (g/dL) Glucose Hgb Hct WBC Plt PT aPTT INR Other Clinician  07/07/2025 04:10 42.9 3.1 134 3.9  22 9     8.6 27 16.9 157     Ginny Cancino RN  Comments:   Facility Information Room # 443 Diagnosis  Facility Information Bed # 3 Diagnosis sepsis  Admission Date 07/05/2025 Stat Treatment No Isolation Information  Ordering MD Quiroz Patient Type Chronic dialysis patient with diagnosis of ESRD Isolation Required? N  Account/Finance Number [id number] [facility] Completed by  Admission Status InPatient Patient Home Unit [facility] information Entered by Ginny Cancino RN  Location Dialysis Suite Multi Code Status Full Code   Start Treatment Time Out Confirmed by MAHENDRA Cancino RN Correct access site verified Yes  Treatment Initiation Connections Secured Time Out Completed 08:14 Treatment Start Date 07/07/2025  Saline line double clamped Correct  patient verified Yes Treatment Start Time 08:20  Hemaclip Applied Correct procedure verified Yes Patient/Family questions and concerns addressed Yes  Pre Focused Assessment Edema LOC Alert and Oriented x3  Access Location Generalized GI / Bowels  Signs and Symptoms of Infection? No Edema Grade 1+ GI Soft  Pain Screening Cardiac   Does the patient have pain? No Heart Rhythm Irregular  Voids  Respiratory Telemetry Yes Completed by  Lung Sounds Diminished Skin Pre Treatment Focused Assessment Completed By Ginny Cancino RN  Location Bases Skin Warm Time 08:14  Position Anterior  Dry Signing  Respiratory Efforts Unlabored LOC Signed By Ginny Cancino RN  Education Focus or Topic Treatment Options Caregiver Education Provided? N/A  Patient Education Method Knowledge / Understanding Assessed Teach back Patient Education Reinforced By  Patient Educated? Yes Family Education Provided? N/A Patient Education Reinforced by Ginny Cancino RN  Post-Treatment HD machine external disinfection completed per policy Yes Completed by  Post Treatment Delay PRO external disinfection completed per policy Yes Post Treatment Form Completed By Ginny Cancino RN  Delay N Post Treatment General Information   Machine Disinfection Requirement Notes Tolerated treatment well, verbal handoff completed with floor nurse   Post Focused Assessment Lung Sounds Diminished LOC  Changes from Pre Focused Assessment Location Bases LOC Alert and Oriented x3  Changes from Pre Treatment Focused Assessment? No Position Anterior GI / Bowels  Access Respiratory Efforts Unlabored GI Soft  Cath Packed with heparin 1000 units/cc Edema   Access Port(ml) 1.6 Location Generalized  Voids  Return Port(ml) 1.6 Edema Grade 1+ Completed by  Catheter clamped and capped Yes Cardiac Post Treatment Focused Assessment Completed by Ginny Cancino RN  Access Flow Good Heart Rhythm Irregular Date 07/07/2025  Dialyzer Clearance Streaked Telemetry Yes Time 12:06  Pain  Screening Skin Signing  Does the patient have pain? No Skin Warm Signed By Ginny Cancino RN  Respiratory  Dry

## 2025-07-07 NOTE — PROGRESS NOTES
[facility] -  Progress Note     LOS: 2 days   Patient Care Team:  Nathan Zimmer MD as PCP - General (Internal Medicine)  Adrien Brewster MD as Consulting Physician (Gastroenterology)  Eleuterio Farley MD (Inactive) as Cardiologist (Cardiology)  Elena Jon APRN as Referring Physician (Vascular Surgery)  Bolivar Rueda MD as Consulting Physician (Nephrology)  Slava Tate APRN as Nurse Practitioner (Family Medicine)  New Omalley MD as Consulting Physician (Pulmonary Disease)  Becky Camacho APRN as Nurse Practitioner (Hematology and Oncology)  Gil Pineda DO as Consulting Physician (Vascular Surgery)  Malu Lozano, DEMETRIUS as Ambulatory  ([facility] [place])    Chief Complaint: NSTEMI in pt with history of CAD and prior CABG    Subjective     Interval History:   Treatment for UTI ongoing by hospital services. BP stable at 115/94, HR 77, temp improved to 97.7 this morning. Patient had dialysis this a.m. He is resting in bed upon being brought back from his dialysis treatment.  He states that he has no new or worsening complaints. He denies chest pain, shortness of breath, lower extremity edema, dizziness, or palpitations.  He appears euvolemic on exam.    Patient Complaints: None at this time        Review of Systems:     Review of Systems   Constitutional:  Negative for chills, diaphoresis, fever and unexpected weight change.   Respiratory:  Negative for cough, chest tightness, shortness of breath and wheezing.    Cardiovascular:  Negative for chest pain, palpitations and leg swelling.   Neurological:  Negative for dizziness and light-headedness.     Objective     Vital Sign Min/Max for last 24 hours  Temp  Min: 97.7 °F (36.5 °C)  Max: 98.3 °F (36.8 °C)   BP  Min: 115/94  Max: 149/60   Pulse  Min: 69  Max: 87   Resp  Min: 16  Max: 18   SpO2  Min: 94 %  Max: 100 %   No data recorded   Weight  Min: 56 kg (123 lb 7.3 oz)  Max:  56 kg (123 lb 7.3 oz)         07/07/25  0600   Weight: 56 kg (123 lb 7.3 oz)       Telemetry: Sinus arhythmia - 70-93      Physical Exam:    Vitals reviewed.   Constitutional:       General: Awake.      Appearance: Normal appearance. Cachectic and frail.   HENT:      Head: Normocephalic.   Neck:      Vascular: JVD normal.   Pulmonary:      Effort: Pulmonary effort is normal.      Breath sounds: Normal breath sounds.   Cardiovascular:      PMI at left midclavicular line. Normal rate. Regular rhythm. Normal S1. Normal S2.       Murmurs: There is a grade 2/6 systolic murmur.      No gallop.  No click. No rub.      Comments: Right AKA  Pulses:     Intact distal pulses.   Edema:     Peripheral edema absent.   Skin:     General: Skin is warm and dry.   Neurological:      General: No focal deficit present.      Mental Status: Alert and oriented to person, place and time.      Coordination: Coordination is intact.   Psychiatric:         Attention and Perception: Attention normal.         Thought Content: Thought content normal.         Judgment: Judgment normal.       Results Review:   Lab Results (last 72 hours)       Procedure Component Value Units Date/Time    Urine Culture - Urine, Straight Cath [[id number]]  (Abnormal) Collected: 07/05/25 1841    Specimen: Urine from Straight Cath Updated: 07/07/25 1253     Urine Culture >100,000 CFU/mL Gram Negative Bacilli    Narrative:      Colonization of the urinary tract without infection is common. Treatment is discouraged unless the patient is symptomatic, pregnant, or undergoing an invasive urologic procedure.    Comprehensive Metabolic Panel [[id number]]  (Abnormal) Collected: 07/07/25 0410    Specimen: Blood Updated: 07/07/25 0508     Glucose 96 mg/dL      BUN 42.9 mg/dL      Creatinine 3.10 mg/dL      Sodium 134 mmol/L      Potassium 3.9 mmol/L      Chloride 100 mmol/L      CO2 22.0 mmol/L      Calcium 9.0 mg/dL      Total Protein 5.6 g/dL      Albumin 2.7 g/dL      ALT  (SGPT) 29 U/L      AST (SGOT) 23 U/L      Alkaline Phosphatase 268 U/L      Total Bilirubin 0.2 mg/dL      Globulin 2.9 gm/dL      A/G Ratio 0.9 g/dL      BUN/Creatinine Ratio 13.8     Anion Gap 12.0 mmol/L      eGFR 19.9 mL/min/1.73     Narrative:      GFR Categories in Chronic Kidney Disease (CKD)              GFR Category          GFR (mL/min/1.73)    Interpretation  G1                    90 or greater        Normal or high (1)  G2                    60-89                Mild decrease (1)  G3a                   45-59                Mild to moderate decrease  G3b                   30-44                Moderate to severe decrease  G4                    15-29                Severe decrease  G5                    14 or less           Kidney failure    (1)In the absence of evidence of kidney disease, neither GFR category G1 or G2 fulfill the criteria for CKD.    eGFR calculation 2021 CKD-EPI creatinine equation, which does not include race as a factor    CBC & Differential [[id number]]  (Abnormal) Collected: 07/07/25 0410    Specimen: Blood Updated: 07/07/25 0440    Narrative:      The following orders were created for panel order CBC & Differential.  Procedure                               Abnormality         Status                     ---------                               -----------         ------                     CBC Auto Differential[[id number]]        Abnormal            Final result                 Please view results for these tests on the individual orders.    CBC Auto Differential [[id number]]  (Abnormal) Collected: 07/07/25 0410    Specimen: Blood Updated: 07/07/25 0440     WBC 16.96 10*3/mm3      RBC 2.74 10*6/mm3      Hemoglobin 8.6 g/dL      Hematocrit 27.0 %      MCV 98.5 fL      MCH 31.4 pg      MCHC 31.9 g/dL      RDW 17.2 %      RDW-SD 59.7 fl      MPV 9.5 fL      Platelets 157 10*3/mm3      Neutrophil % 83.1 %      Lymphocyte % 6.7 %      Monocyte % 5.6 %      Eosinophil % 2.5 %      Basophil %  0.5 %      Immature Grans % 1.6 %      Neutrophils, Absolute 14.10 10*3/mm3      Lymphocytes, Absolute 1.14 10*3/mm3      Monocytes, Absolute 0.95 10*3/mm3      Eosinophils, Absolute 0.42 10*3/mm3      Basophils, Absolute 0.08 10*3/mm3      Immature Grans, Absolute 0.27 10*3/mm3      nRBC 0.0 /100 WBC     Hepatitis B Surface Antibody [[id number]] Collected: 07/06/25 1444    Specimen: Blood Updated: 07/06/25 2045     Hep B S Ab Non-Reactive    Narrative:      Non-Reactive - Individual is considered to be not immune to infection with HBV.    Equivocal - Unable to determine if anti-HBs is present at levels consistent with immunity. The individual should be further assessed by associated risk factors and the use of additional diagnositc information, or another sample may be collected and tested.    Reactive - Anti-HBs concentration detected at >10 mIU/mL. Individual is considered to be immune to infection with HBV.      Results may be falsely decreased if patient taking Biotin.      Blood Culture - Blood, Arm, Left [[id number]]  (Normal) Collected: 07/05/25 1731    Specimen: Blood from Arm, Left Updated: 07/06/25 1931     Blood Culture No growth at 24 hours    Blood Culture - Blood, Arm, Left [[id number]]  (Normal) Collected: 07/05/25 1731    Specimen: Blood from Arm, Left Updated: 07/06/25 1745     Blood Culture No growth at 24 hours    Hepatitis B Surface Antigen [[id number]]  (Normal) Collected: 07/06/25 1444    Specimen: Blood Updated: 07/06/25 1520     Hepatitis B Surface Ag Non-Reactive    Comprehensive Metabolic Panel [[id number]]  (Abnormal) Collected: 07/06/25 0537    Specimen: Blood Updated: 07/06/25 0619     Glucose 94 mg/dL      BUN 33.9 mg/dL      Creatinine 2.47 mg/dL      Sodium 132 mmol/L      Potassium 3.7 mmol/L      Chloride 97 mmol/L      CO2 25.0 mmol/L      Calcium 8.5 mg/dL      Total Protein 5.6 g/dL      Albumin 2.9 g/dL      ALT (SGPT) 48 U/L      AST (SGOT) 51 U/L      Alkaline Phosphatase 312 U/L       Total Bilirubin 0.4 mg/dL      Globulin 2.7 gm/dL      A/G Ratio 1.1 g/dL      BUN/Creatinine Ratio 13.7     Anion Gap 10.0 mmol/L      eGFR 26.2 mL/min/1.73     Narrative:      GFR Categories in Chronic Kidney Disease (CKD)              GFR Category          GFR (mL/min/1.73)    Interpretation  G1                    90 or greater        Normal or high (1)  G2                    60-89                Mild decrease (1)  G3a                   45-59                Mild to moderate decrease  G3b                   30-44                Moderate to severe decrease  G4                    15-29                Severe decrease  G5                    14 or less           Kidney failure    (1)In the absence of evidence of kidney disease, neither GFR category G1 or G2 fulfill the criteria for CKD.    eGFR calculation 2021 CKD-EPI creatinine equation, which does not include race as a factor    Magnesium [[id number]]  (Normal) Collected: 07/06/25 0537    Specimen: Blood Updated: 07/06/25 0619     Magnesium 1.9 mg/dL     Phosphorus [[id number]]  (Abnormal) Collected: 07/06/25 0537    Specimen: Blood Updated: 07/06/25 0619     Phosphorus 2.4 mg/dL     CBC Auto Differential [[id number]]  (Abnormal) Collected: 07/06/25 0537    Specimen: Blood Updated: 07/06/25 0557     WBC 22.70 10*3/mm3      RBC 2.75 10*6/mm3      Hemoglobin 8.7 g/dL      Hematocrit 27.4 %      MCV 99.6 fL      MCH 31.6 pg      MCHC 31.8 g/dL      RDW 17.1 %      RDW-SD 59.7 fl      MPV 9.7 fL      Platelets 159 10*3/mm3      Neutrophil % 82.5 %      Lymphocyte % 5.5 %      Monocyte % 8.5 %      Eosinophil % 0.5 %      Basophil % 0.4 %      Immature Grans % 2.6 %      Neutrophils, Absolute 18.75 10*3/mm3      Lymphocytes, Absolute 1.24 10*3/mm3      Monocytes, Absolute 1.93 10*3/mm3      Eosinophils, Absolute 0.11 10*3/mm3      Basophils, Absolute 0.09 10*3/mm3      Immature Grans, Absolute 0.58 10*3/mm3      nRBC 0.0 /100 WBC     High Sensitivity Troponin T  [[id number]]  (Abnormal) Collected: 07/05/25 2304    Specimen: Blood Updated: 07/05/25 2353     HS Troponin T 216 ng/L     Narrative:      High Sensitive Troponin T Reference Range:  <14.0 ng/L- Negative Female for AMI  <22.0 ng/L- Negative Male for AMI  >=14 - Abnormal Female indicating possible myocardial injury.  >=22 - Abnormal Male indicating possible myocardial injury.   Clinicians would have to utilize clinical acumen, EKG, Troponin, and serial changes to determine if it is an Acute Myocardial Infarction or myocardial injury due to an underlying chronic condition.         High Sensitivity Troponin T 1Hr [[id number]]  (Abnormal) Collected: 07/05/25 1946    Specimen: Blood from Arm, Left Updated: 07/05/25 2015     HS Troponin T 176 ng/L      Troponin T Numeric Delta 29 ng/L      Troponin T % Delta 20    Narrative:      High Sensitive Troponin T Reference Range:  <14.0 ng/L- Negative Female for AMI  <22.0 ng/L- Negative Male for AMI  >=14 - Abnormal Female indicating possible myocardial injury.  >=22 - Abnormal Male indicating possible myocardial injury.   Clinicians would have to utilize clinical acumen, EKG, Troponin, and serial changes to determine if it is an Acute Myocardial Infarction or myocardial injury due to an underlying chronic condition.         High Sensitivity Troponin T [[id number]]  (Abnormal) Collected: 07/05/25 1756    Specimen: Blood from Arm, Left Updated: 07/05/25 1937     HS Troponin T 147 ng/L     Narrative:      High Sensitive Troponin T Reference Range:  <14.0 ng/L- Negative Female for AMI  <22.0 ng/L- Negative Male for AMI  >=14 - Abnormal Female indicating possible myocardial injury.  >=22 - Abnormal Male indicating possible myocardial injury.   Clinicians would have to utilize clinical acumen, EKG, Troponin, and serial changes to determine if it is an Acute Myocardial Infarction or myocardial injury due to an underlying chronic condition.         Comprehensive Metabolic Panel  [[id number]]  (Abnormal) Collected: 07/05/25 1756    Specimen: Blood from Arm, Left Updated: 07/05/25 1933     Glucose 103 mg/dL      BUN 29.6 mg/dL      Creatinine 2.02 mg/dL      Sodium 135 mmol/L      Potassium 3.7 mmol/L      Chloride 99 mmol/L      CO2 24.0 mmol/L      Calcium 8.4 mg/dL      Total Protein 5.4 g/dL      Albumin 2.8 g/dL      ALT (SGPT) 39 U/L      AST (SGOT) 46 U/L      Alkaline Phosphatase 322 U/L      Total Bilirubin 0.3 mg/dL      Globulin 2.6 gm/dL      A/G Ratio 1.1 g/dL      BUN/Creatinine Ratio 14.7     Anion Gap 12.0 mmol/L      eGFR 33.3 mL/min/1.73     Narrative:      GFR Categories in Chronic Kidney Disease (CKD)              GFR Category          GFR (mL/min/1.73)    Interpretation  G1                    90 or greater        Normal or high (1)  G2                    60-89                Mild decrease (1)  G3a                   45-59                Mild to moderate decrease  G3b                   30-44                Moderate to severe decrease  G4                    15-29                Severe decrease  G5                    14 or less           Kidney failure    (1)In the absence of evidence of kidney disease, neither GFR category G1 or G2 fulfill the criteria for CKD.    eGFR calculation 2021 CKD-EPI creatinine equation, which does not include race as a factor    Urinalysis With Culture If Indicated - Straight Cath [[id number]]  (Abnormal) Collected: 07/05/25 1841    Specimen: Urine from Straight Cath Updated: 07/05/25 1913     Color, UA Yellow     Appearance, UA Turbid     pH, UA 7.5     Specific Gravity, UA 1.014     Glucose,  mg/dL (Trace)     Ketones, UA Negative     Bilirubin, UA Negative     Blood, UA Large (3+)     Protein, UA >=300 mg/dL (3+)     Leuk Esterase, UA Large (3+)     Nitrite, UA Positive     Urobilinogen, UA 1.0 E.U./dL    Narrative:      In absence of clinical symptoms, the presence of pyuria, bacteria, and/or nitrites on the urinalysis result does not  correlate with infection.    Urinalysis, Microscopic Only - Straight Cath [[id number]]  (Abnormal) Collected: 07/05/25 1841    Specimen: Urine from Straight Cath Updated: 07/05/25 1913     RBC, UA Too Numerous to Count /HPF      WBC, UA Too Numerous to Count /HPF      Bacteria, UA 4+ /HPF      Squamous Epithelial Cells, UA None Seen /HPF      Hyaline Casts, UA None Seen /LPF      Methodology Manual Light Microscopy    Respiratory Panel PCR w/COVID-19(SARS-CoV-2) TOSIN/JOVAN/ANTONIO/PAD/COR/CHARLENE In-House, NP Swab in UTM/VTM, 2 HR TAT - Swab, Nasopharynx [[id number]]  (Normal) Collected: 07/05/25 1725    Specimen: Swab from Nasopharynx Updated: 07/05/25 1829     ADENOVIRUS, PCR Not Detected     Coronavirus 229E Not Detected     Coronavirus HKU1 Not Detected     Coronavirus NL63 Not Detected     Coronavirus OC43 Not Detected     COVID19 Not Detected     Human Metapneumovirus Not Detected     Human Rhinovirus/Enterovirus Not Detected     Influenza A PCR Not Detected     Influenza B PCR Not Detected     Parainfluenza Virus 1 Not Detected     Parainfluenza Virus 2 Not Detected     Parainfluenza Virus 3 Not Detected     Parainfluenza Virus 4 Not Detected     RSV, PCR Not Detected     Bordetella pertussis pcr Not Detected     Bordetella parapertussis PCR Not Detected     Chlamydophila pneumoniae PCR Not Detected     Mycoplasma pneumo by PCR Not Detected    Narrative:      In the setting of a positive respiratory panel with a viral infection PLUS a negative procalcitonin without other underlying concern for bacterial infection, consider observing off antibiotics or discontinuation of antibiotics and continue supportive care. If the respiratory panel is positive for atypical bacterial infection (Bordetella pertussis, Chlamydophila pneumoniae, or Mycoplasma pneumoniae), consider antibiotic de-escalation to target atypical bacterial infection.    Lactic Acid, Plasma [[id number]]  (Normal) Collected: 07/05/25 1758    Specimen: Blood from  Arm, Left Updated: 07/05/25 1825     Lactate 2.0 mmol/L     CBC & Differential [[id number]]  (Abnormal) Collected: 07/05/25 1756    Specimen: Blood from Arm, Left Updated: 07/05/25 1811    Narrative:      The following orders were created for panel order CBC & Differential.  Procedure                               Abnormality         Status                     ---------                               -----------         ------                     CBC Auto Differential[[id number]]        Abnormal            Final result                 Please view results for these tests on the individual orders.    CBC Auto Differential [[id number]]  (Abnormal) Collected: 07/05/25 1756    Specimen: Blood from Arm, Left Updated: 07/05/25 1811     WBC 20.93 10*3/mm3      RBC 2.40 10*6/mm3      Hemoglobin 7.7 g/dL      Hematocrit 23.4 %      MCV 97.5 fL      MCH 32.1 pg      MCHC 32.9 g/dL      RDW 16.2 %      RDW-SD 56.2 fl      MPV 9.7 fL      Platelets 170 10*3/mm3      Neutrophil % 85.8 %      Lymphocyte % 3.7 %      Monocyte % 7.6 %      Eosinophil % 0.9 %      Basophil % 0.3 %      Immature Grans % 1.7 %      Neutrophils, Absolute 17.95 10*3/mm3      Lymphocytes, Absolute 0.77 10*3/mm3      Monocytes, Absolute 1.60 10*3/mm3      Eosinophils, Absolute 0.18 10*3/mm3      Basophils, Absolute 0.07 10*3/mm3      Immature Grans, Absolute 0.36 10*3/mm3      nRBC 0.0 /100 WBC                 Echo EF Estimated  Results for orders placed during the hospital encounter of 01/10/25    Adult Transthoracic Echo Complete w/ Color, Spectral and Contrast if Necessary Per Protocol 01/11/2025  1:19 PM    Interpretation Summary    Left ventricular systolic function is normal. Left ventricular ejection fraction appears to be 56 - 60%.    Left ventricular wall thickness is consistent with mild septal asymmetric hypertrophy.    Left ventricular diastolic function is consistent with (grade II w/high LAP) pseudonormalization.    Normal right ventricular  cavity size and systolic function noted.    The left atrial cavity is mild to moderately dilated.    The right atrial cavity is dilated.    Mild aortic valve stenosis is present.    Mild to moderate mitral valve regurgitation is present.    Moderate to severe tricuspid valve regurgitation is present.    Estimated right ventricular systolic pressure from tricuspid regurgitation is markedly elevated (>55 mmHg).       Medication Review: yes  Current Facility-Administered Medications   Medication Dose Route Frequency Provider Last Rate Last Admin    acetaminophen (TYLENOL) tablet 650 mg  650 mg Oral Q4H PRN Dc Issa DO        albuterol (ACCUNEB) nebulizer solution 0.63 mg  0.63 mg Nebulization Q6H PRN Vidal Miramontes MD        aspirin EC tablet 81 mg  81 mg Oral Daily Dc Issa DO   81 mg at 07/06/25 0848    atorvastatin (LIPITOR) tablet 10 mg  10 mg Oral Daily Dc Issa DO   10 mg at 07/06/25 0848    sennosides-docusate (PERICOLACE) 8.6-50 MG per tablet 2 tablet  2 tablet Oral BID Dc Issa DO   2 tablet at 07/06/25 0848    And    polyethylene glycol (MIRALAX) packet 17 g  17 g Oral Daily PRN Dc Issa DO        And    bisacodyl (DULCOLAX) EC tablet 5 mg  5 mg Oral Daily PRN Dc Issa DO        And    bisacodyl (DULCOLAX) suppository 10 mg  10 mg Rectal Daily PRN Dc Issa DO        budesonide-formoterol (SYMBICORT) 160-4.5 MCG/ACT inhaler 2 puff  2 puff Inhalation BID - RT Dc Issa DO   2 puff at 07/07/25 0720    And    ipratropium (ATROVENT) nebulizer solution 0.5 mg  0.5 mg Nebulization 4x Daily - RT Dc Issa DO   0.5 mg at 07/07/25 0716    calcitriol (ROCALTROL) capsule 0.5 mcg  0.5 mcg Oral Daily Dc Issa DO   0.5 mcg at 07/06/25 0849    carvedilol (COREG) tablet 6.25 mg  6.25 mg Oral BID With Meals Dc Issa DO   6.25 mg at 07/06/25 0849    cefTRIAXone (ROCEPHIN) 2,000 mg in sodium chloride 0.9 % 100 mL MBP  2,000 mg  Intravenous Q24H Dc Issa  mL/hr at 07/06/25 1735 2,000 mg at 07/06/25 1735    clopidogrel (PLAVIX) tablet 75 mg  75 mg Oral Daily Dc Issa DO   75 mg at 07/06/25 0849    empagliflozin (JARDIANCE) tablet 10 mg  10 mg Oral Daily Vidal Miramontes MD   10 mg at 07/06/25 1225    epoetin cecile-epbx (RETACRIT) injection 10,000 Units  10,000 Units Subcutaneous Once per day on Monday Wednesday Friday Giuliano Saldana MD        fluticasone (FLONASE) 50 MCG/ACT nasal spray 2 spray  2 spray Nasal Daily PRN Vidal Miramontes MD        gabapentin (NEURONTIN) capsule 300 mg  300 mg Oral Nightly Dc Issa DO   300 mg at 07/06/25 2018    heparin (porcine) 5000 UNIT/ML injection 5,000 Units  5,000 Units Subcutaneous Q12H Dc Issa DO   5,000 Units at 07/06/25 2018    heparin (porcine) injection 3,200 Units  3,200 Units Intracatheter PRN Twan Quiroz MD   3,200 Units at 07/07/25 1201    hydrALAZINE (APRESOLINE) tablet 50 mg  50 mg Oral TID Vidal Miramontes MD        iron polysaccharides (NIFEREX) capsule 150 mg  150 mg Oral Daily Dc Issa DO   150 mg at 07/06/25 0848    isosorbide dinitrate (ISORDIL) tablet 10 mg  10 mg Oral TID - Nitrates Dc Issa DO   10 mg at 07/06/25 1733    levothyroxine (SYNTHROID, LEVOTHROID) tablet 100 mcg  100 mcg Oral Q AM Dc Issa DO   100 mcg at 07/07/25 0510    montelukast (SINGULAIR) tablet 10 mg  10 mg Oral Nightly Dc Issa DO   10 mg at 07/06/25 2018    NIFEdipine XL (PROCARDIA XL) 24 hr tablet 120 mg  120 mg Oral Daily Vidal Miramontes MD   120 mg at 07/06/25 1225    oxyCODONE-acetaminophen (PERCOCET) 5-325 MG per tablet 1 tablet  1 tablet Oral Q6H PRN Dc Issa DO   1 tablet at 07/07/25 0913    pantoprazole (PROTONIX) EC tablet 40 mg  40 mg Oral Q AM Dc Issa DO   40 mg at 07/07/25 0510    sodium chloride 0.9 % flush 10 mL  10 mL Intravenous Q12H Dc Issa DO   10 mL at 07/06/25 2019    sodium  chloride 0.9 % flush 10 mL  10 mL Intravenous PRN Dc Issa, DO        sodium chloride 0.9 % infusion 40 mL  40 mL Intravenous PRN Dc Issa, DO        tamsulosin (FLOMAX) 24 hr capsule 0.4 mg  0.4 mg Oral Nightly Vidal Miramontes MD   0.4 mg at 07/06/25 2018    valsartan (DIOVAN) tablet 320 mg  320 mg Oral Daily Vidal Miramontes MD             Assessment & Plan       Sepsis    Essential hypertension    Peripheral arterial disease    IHD (ischemic heart disease)    Anemia due to chronic kidney disease    Chronic diastolic (congestive) heart failure    ESRD (end stage renal disease) on dialysis    Elevated troponin    Coronary artery disease involving native coronary artery of native heart without angina pectoris      - No new or worsening symptoms at this time. Elevated troponin still presumed to be secondary to the patient's comorbidities of end-stage renal disease, vascular disease, anemia, and his acute UTI.    - Further ischemic testing in outpatient setting.    - Continue aspirin 81 mg daily.    - Continue atorvastatin 10 mg daily.    - Continue carvedilol 6.25 mg 3 times daily.    - Continue Plavix 75 mg daily.    - Continue Jardiance 10 mg daily.    - Continue hydralazine 50 mg 3 times daily.    - Continue Isordil 10 mg 3 times daily.    - Continue Procardia  mg daily.    - We will order a limited echo to be performed today.  Pending results of this, cardiology will potentially sign off if there is no further need of our services. Patient is stable from a cardiology standpoint.    - Dialysis this morning. Management of volume and electrolytes per dialysis.   Treatment schedule M/W/F    Continue sepsis and UTI treatment by hospital provider.        Further orders per Dr. Hopson      Electronically signed by STERLING Moss, 07/07/25, 1:21 PM CDT.

## 2025-07-07 NOTE — PROGRESS NOTES
Nutrition Services    Patient Name:  Ricardo Hugo  YOB: 1950  MRN: [id number]  Admit Date:  7/5/2025    Patient Name: Ricardo Hugo  YOB: 1950  MRN: [id number]  Admission date: 7/5/2025  Reason for Encounter: Low BMI    [facility] Clinical Nutrition Assessment     Subjective    Subjective Information     7/7:  Pt was not in his room at time of RD visit.  He is currently on a renal, cardiac diet with thin liquids. No documented intake as of yet. He does have a diagnosis of ESRD with HD encounters.  Will start patient on Novasource Renal tid to provide additional kcals/protein needed.  His current wt is 123.5#; historically, wt has fluctuated. Suspected d/t dx with HD encounters r/t fluid shifts.  He is also noted to have recently had a R AKA.  Carlos Eduardo 16.  Last BM 7/9. UOP ~675 cc/day plus 6 unmeasured occurrences.  NFPE not performed this day; will reattempt.  Will continue to monitor and f/u as needed.       Assessment    H&P and Current Problems      H&P  Past Medical History:   Diagnosis Date    3-vessel CAD 08/11/2020    Allergic rhinitis     Anemia     Anxiety disorder 04/27/2020    Arthritis     Asymmetrical sensorineural hearing loss 06/28/2017    Atherosclerosis of native artery of both lower extremities with intermittent claudication 07/18/2019    Avascular necrosis of femoral head, left 07/11/2020    right hip after surgery    Carotid stenosis     Chronic mucoid otitis media     Chronic rhinitis     COPD (chronic obstructive pulmonary disease)     Coronary artery disease     HEART BYPASS 2004    Crohn's disease of large intestine with other complication 07/30/2020    Chronic diarrhea Colonoscopy July 2020 revealed mild patchy scattered hemosiderin staining with inflammation more so in rectosigmoid area.  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    Iron deficiency anemia 07/14/2020    Mixed hearing loss of left ear     PAD (peripheral artery disease) 08/11/2020    Perianal abscess     Pernicious anemia 08/17/2020    took shots but never diagnosed with b12 deficiency    Personal history of alcoholism 08/11/2020    quit drinking in 2013    Prostatic hypertrophy 08/11/2020    Sensorineural hearing loss     Sepsis with acute renal failure 09/15/2020    Shortness of breath 05/27/2021    Tinnitus     Ventricular tachycardia, nonsustained 07/14/2020    Weight loss 07/11/2020      Past Surgical History:   Procedure Laterality Date    ABOVE KNEE AMPUTATION Right 6/24/2025    Procedure: right above knee amputation;  Surgeon: Blayne Zabala MD;  Location:  [facility] OR;  Service: Vascular;  Laterality: Right;    AORTOGRAM Right 4/25/2025    Procedure: RIGHT LOWER EXTREMITY ANGIOGRAM, SHOCKWAVE LITHOTRIPSY, BALLOON ANGIOPLASTY, MYNX CLOSURE;  Surgeon: Gil Pineda DO;  Location:  [facility] HYBRID OR;  Service: Vascular;  Laterality: Right;    AORTOGRAM Left 5/22/2025    Procedure: LEFT LOWER EXTREMITY ANGIOGRAM, SHOCKWAVE LITHOTRIPSY, BALLOON ANGIOPLASTY, STENT PLACEMENT, MYNX CLOSURE;  Surgeon: Blayne Zabala MD;  Location:  [facility] HYBRID OR;  Service: Vascular;  Laterality: Left;    AORTOGRAM Right 5/30/2025    Procedure: AORTOILIAC ANGIOGRAM WITH LEFT LOWER EXTREMITY ANGIOGRAM;  Surgeon: Gil Pineda DO;  Location:  [facility] HYBRID OR;  Service: Vascular;  Laterality: Right;    AORTOGRAM Right 6/20/2025    Procedure: right lower extremity arteriogram, shockwave lithotripsy, balloon angioplasty, mynx closue;  Surgeon: Blayne Zabala MD;  Location:  [facility] HYBRID OR;  Service: Vascular;  Laterality: Right;    ARTERY SURGERY   2021    right side on neck    CAROTID ENDARTERECTOMY Right 05/10/2021    Procedure: RIGHT CAROTID ENDARTERECTOMY WITH EEG;  Surgeon: Gil Pineda DO;  Location: [facility] HYBRID OR 12;  Service: Vascular;  Laterality: Right;    COLONOSCOPY N/A 07/02/2020    Procedure: COLONOSCOPY WITH ANESTHESIA;  Surgeon: Adrien Brewster MD;  Location: [facility] ENDOSCOPY;  Service: Gastroenterology;  Laterality: N/A;  pre op: diarrhea  post op: polyps  PCP: Joe Velasco MD    COLONOSCOPY N/A 10/13/2020    Procedure: COLONOSCOPY WITH ANESTHESIA;  Surgeon: Adrien Brewster MD;  Location: [facility] ENDOSCOPY;  Service: Gastroenterology;  Laterality: N/A;  Pre: Chronic Diarrhea, Crohn's  Post: AVM  Dr. Neftali Velasco  CO2 Inflation Used    COLONOSCOPY N/A 12/08/2023    Procedure: COLONOSCOPY WITH ANESTHESIA;  Surgeon: Adrien Brewster MD;  Location: [facility] ENDOSCOPY;  Service: Gastroenterology;  Laterality: N/A;  pre chrone's disease  post sub optimal prep, polyp, chrone's      CORONARY ARTERY BYPASS GRAFT  2003    x3    ENDOSCOPY N/A 11/02/2021    Procedure: ESOPHAGOGASTRODUODENOSCOPY WITH ANESTHESIA;  Surgeon: Bridger Bell MD;  Location: [facility] ENDOSCOPY;  Service: Gastroenterology;  Laterality: N/A;  pre anemia;gi bleed  post  gi bleed;schatski ring  Dr. ERIC Velasco    ENDOSCOPY N/A 10/10/2023    Procedure: ESOPHAGOGASTRODUODENOSCOPY WITH ANESTHESIA;  Surgeon: Adrien Brewster MD;  Location: [facility] ENDOSCOPY;  Service: Gastroenterology;  Laterality: N/A;  preop; anemia  postop esophagitis ; R/O barretts   PCP Randall Beata    EYE SURGERY Bilateral     catorac    INCISION AND DRAINAGE PERIRECTAL ABSCESS N/A 03/03/2017    Procedure: INCISION AND DRAINAGE OF JEET ANAL ABSCESS;  Surgeon: Lynette Smith MD;  Location: [facility] OR;  Service:     INGUINAL HERNIA REPAIR Bilateral 06/27/2023    Procedure: INGUINAL HERNIA BILATERAL REPAIR LAPAROSCOPIC WITH DAVINCI ROBOT WITH MESH;  Surgeon: Tahira Rivera MD;   "Location:  [facility] OR;  Service: Robotics - DaVinci;  Laterality: Bilateral;    INSERTION HEMODIALYSIS CATHETER N/A 4/16/2025    Procedure: HEMODIALYSIS CATHETER PLACEMENT;  Surgeon: Gil Pineda DO;  Location:  [facility] HYBRID OR;  Service: Vascular;  Laterality: N/A;    MYRINGOTOMY W/ TUBES Left 04/17/2017    06/10/2016    TONSILLECTOMY      TOTAL HIP ARTHROPLASTY Right 2006      Current Problems   Admission Diagnosis:  Sepsis [A41.9]    Problem List:    Sepsis    Essential hypertension    Peripheral arterial disease    IHD (ischemic heart disease)    Anemia due to chronic kidney disease    Chronic diastolic (congestive) heart failure    ESRD (end stage renal disease) on dialysis    Elevated troponin    Coronary artery disease involving native coronary artery of native heart without angina pectoris       Anthropometrics      BMI, Height, Weight Estimated body mass index is 16.74 kg/m² as calculated from the following:    Height as of this encounter: 182.9 cm (72\").    Weight as of this encounter: 56 kg (123 lb 7.3 oz).    Weight Method: Bed scale       Trending Weight Changes Unknown/Unable to Determine       Weight History  Wt Readings from Last 20 Encounters:   07/07/25 0600 56 kg (123 lb 7.3 oz)   07/05/25 2123 55 kg (121 lb 3.2 oz)   07/05/25 1647 49.9 kg (110 lb)   07/03/25 0514 36.7 kg (81 lb)   07/02/25 0457 39.6 kg (87 lb 6.4 oz)   07/01/25 0509 35.5 kg (78 lb 4.8 oz)   06/30/25 0325 40.1 kg (88 lb 8 oz)   06/29/25 0500 36.8 kg (81 lb 1.6 oz)   06/28/25 0600 39.9 kg (88 lb)   06/26/25 0608 39.7 kg (87 lb 9.6 oz)   06/25/25 0524 43.2 kg (95 lb 4.8 oz)   06/24/25 0531 42.3 kg (93 lb 3.2 oz)   06/20/25 0520 60.5 kg (133 lb 4.8 oz)   06/19/25 1844 62.5 kg (137 lb 11.2 oz)   06/19/25 1601 61.8 kg (136 lb 3.2 oz)   06/05/25 0626 58.7 kg (129 lb 6.4 oz)   06/04/25 0600 60.7 kg (133 lb 13.1 oz)   06/03/25 0418 59.5 kg (131 lb 3.2 oz)   05/31/25 2325 61.6 kg (135 lb 14.4 oz)   05/31/25 0302 61.1 kg (134 lb 11.2 " oz)   05/30/25 0323 62.3 kg (137 lb 5.6 oz)   05/29/25 0325 59.8 kg (131 lb 13.4 oz)   05/28/25 0500 61.7 kg (136 lb 0.4 oz)   05/27/25 0703 62.1 kg (136 lb 14.5 oz)   05/25/25 0700 65.3 kg (143 lb 15.4 oz)   05/24/25 0315 63.1 kg (139 lb 1.8 oz)   05/23/25 0500 64.6 kg (142 lb 6.4 oz)   05/22/25 0600 62 kg (136 lb 11.2 oz)   05/21/25 1954 62 kg (136 lb 11.2 oz)   05/21/25 1527 61.7 kg (136 lb 0.4 oz)   05/19/25 1422 61.7 kg (136 lb)   05/13/25 0842 66.2 kg (146 lb)   04/28/25 0942 66.7 kg (147 lb)   04/28/25 0901 67.4 kg (148 lb 9.6 oz)   04/26/25 0525 60.1 kg (132 lb 9.6 oz)   04/24/25 0634 60.1 kg (132 lb 9.6 oz)   04/23/25 0600 60.9 kg (134 lb 3.2 oz)   04/22/25 0453 61.6 kg (135 lb 14.4 oz)   04/21/25 0501 67.5 kg (148 lb 12.8 oz)   04/20/25 0750 60.3 kg (133 lb)   04/16/25 0629 65.7 kg (144 lb 13.5 oz)   04/14/25 1438 61.7 kg (136 lb)   04/13/25 1946 64.6 kg (142 lb 6.4 oz)   04/11/25 1429 66.2 kg (146 lb)   04/09/25 1333 65.3 kg (144 lb)   04/07/25 1415 65.3 kg (144 lb)   04/02/25 0854 68.9 kg (152 lb)   03/24/25 0929 69 kg (152 lb 3.2 oz)   03/17/25 0830 66.7 kg (147 lb)   03/10/25 0841 64.3 kg (141 lb 12.8 oz)   03/03/25 1113 65.1 kg (143 lb 9.6 oz)   02/27/25 1021 65.3 kg (144 lb)            Labs        Results from last 7 days   Lab Units 07/07/25  0410 07/06/25  0537 07/05/25  1758 07/05/25  1756   SODIUM mmol/L 134* 132*  --  135*   POTASSIUM mmol/L 3.9 3.7  --  3.7   GLUCOSE mg/dL 96 94  --  103*   BUN mg/dL 42.9* 33.9*  --  29.6*   CREATININE mg/dL 3.10* 2.47*  --  2.02*   CALCIUM mg/dL 9.0 8.5*  --  8.4*   PHOSPHORUS mg/dL  --  2.4*  --   --    MAGNESIUM mg/dL  --  1.9  --   --    ALBUMIN g/dL 2.7* 2.9*  --  2.8*   LACTATE mmol/L  --   --  2.0  --    BILIRUBIN mg/dL 0.2 0.4  --  0.3   ALK PHOS U/L 268* 312*  --  322*   AST (SGOT) U/L 23 51*  --  46*   ALT (SGPT) U/L 29 48*  --  39     Results from last 7 days   Lab Units 07/07/25 0410 07/06/25  0537 07/05/25  1756   PLATELETS 10*3/mm3 157 159 170    HEMOGLOBIN g/dL 8.6* 8.7* 7.7*   HEMATOCRIT % 27.0* 27.4* 23.4*     Lab Results   Component Value Date    HGBA1C 4.90 06/30/2025          Medications       Scheduled Medications aspirin, 81 mg, Oral, Daily  atorvastatin, 10 mg, Oral, Daily  budesonide-formoterol, 2 puff, Inhalation, BID - RT   And  ipratropium, 0.5 mg, Nebulization, 4x Daily - RT  calcitriol, 0.5 mcg, Oral, Daily  carvedilol, 6.25 mg, Oral, BID With Meals  cefTRIAXone, 2,000 mg, Intravenous, Q24H  clopidogrel, 75 mg, Oral, Daily  empagliflozin, 10 mg, Oral, Daily  epoetin cecile/cecile-epbx, 10,000 Units, Subcutaneous, Once per day on Monday Wednesday Friday  gabapentin, 300 mg, Oral, Nightly  heparin (porcine), 5,000 Units, Subcutaneous, Q12H  hydrALAZINE, 50 mg, Oral, TID  iron polysaccharides, 150 mg, Oral, Daily  isosorbide dinitrate, 10 mg, Oral, TID - Nitrates  levothyroxine, 100 mcg, Oral, Q AM  montelukast, 10 mg, Oral, Nightly  NIFEdipine XL, 120 mg, Oral, Daily  pantoprazole, 40 mg, Oral, Q AM  senna-docusate sodium, 2 tablet, Oral, BID  sodium chloride, 10 mL, Intravenous, Q12H  tamsulosin, 0.4 mg, Oral, Nightly  valsartan, 320 mg, Oral, Daily        Infusions      PRN Medications   acetaminophen    albuterol    senna-docusate sodium **AND** polyethylene glycol **AND** bisacodyl **AND** bisacodyl    fluticasone    heparin (porcine)    oxyCODONE-acetaminophen    sodium chloride    sodium chloride     Physical Findings      Chewing/Swallowing  Teeth Status: Mouth/Teeth WDL: .WDL except, teeth Teeth Symptoms: dental appliance present  Chewing/Swallowing Issues: No issues identified at this time   Edema                            Bowel Function  Stool Output  Stool Unmeasured Occurrence: 1 (07/07/25 0134)  Bowel Incontinence: Yes (07/07/25 0134)  Stool Amount: Smear (07/07/25 0134)  Perineal Care: absorbent brief/pad changed (07/07/25 0600)  Last Bowel Movement: 07/06/25   I/Os  Intake & Output (last 3 days)         07/04 0701 07/05 0700 07/05  0701  07/06 0700 07/06 0701  07/07 0700 07/07 0701  07/08 0700    IV Piggyback  100      Total Intake(mL/kg)  100 (1.8)      Urine (mL/kg/hr)  150 1200 (0.9)     Stool   0     Total Output  150 1200     Net  -50 -1200             Urine Unmeasured Occurrence  3 x 3 x     Stool Unmeasured Occurrence   2 x            Lines, Drains, Airways, & Wounds       Active LDAs       Name Placement date Placement time Site Days Last dressing change    Peripheral IV 07/05/25 1759 20 G Left;Posterior Forearm 07/05/25  1759  Forearm  1 07/06/25 2000 (15.95 hrs)    External Urinary Catheter 07/06/25  0641  --  1     Hemodialysis Cath Double 04/16/25  0913  Internal Jugular  82     Wound 06/19/25 2024 Left anterior plantar 06/19/25 2024  -- 17     Wound 07/05/25 2211 Right anterior knee 07/05/25 2211  -- 1                       Nutrition Focused Physical Exam     Trending NFPE Unable to perform; pt not in room.     Malnutrition Severity Assessment              Estimated Needs      Energy Requirements    Weight for Calculation 56kg   Method for Estimation  30-35 kcals/kg   Daily Needs (kcal/day) 9472-1920 kcals/day   Protein Requirements    Weight for Calculation 178#/~81kg (based on IBW)   Method for Estimation 1.0-1.2 gm/kg   Daily Needs (g/day) 81-97 gms/day   Fluid Requirements     Method for Estimation 1000 mL + urine Output    Daily Needs (mL/day) Changes based on UOP     Current Nutrition Orders & Evaluation of Intake      Oral Nutrition     Food Allergies  and Intolerances NKFA   Current PO Diet Diet: Renal, Cardiac; Low Sodium (2g); Low Sodium (2-3g), Low Potassium, Low Phosphorus; Texture: Regular (IDDSI 7); Fluid Consistency: Thin (IDDSI 0)   Oral Nutrition Supplement None     Trending % PO Intake No documented intake as of now.     Enteral Nutrition     Current EN Order Patient isn't on Tube Feeding  Patient doesn't have any tube feeding modular orders     EN Route      EN Tolerance     EN Observation/Intake          Parenteral Nutrition     Current TPN Order    TPN Route    Lipids (mL/%/frequency)     Total # Days on TPN    TPN Observation/Intake       Assessment & Plan   Nutrition Diagnosis and Goals       Nutrition Diagnosis 1 Increased Nutrient Needs (pro/kcals) r/t ESRD with HD encounters AEB BMI 16.74.            Goal(s) Establish PO Intake, Meets Estimated Needs , Accepts Oral Nutrition Supplement, Tolerates PO Diet , No Significant Weight Loss, and Skin Integrity to Improve     Nutrition Intervention and Prescription       Intervention  Start oral nutrition supplement, Advised alternate menu selections, and Continue to monitor for plan of care      Diet Prescription No changes    [facility]    Education Provided  No needs identified at his time.        Enteral Prescription        TPN Prescription      Monitoring/Evaluation       Monitor/Evaluation Per Protocol     RD Follow-Up Encounter 3-5 days     Electronically signed by:  Jeffry Morgan RD  07/07/25 11:57 CDT      Electronically signed by:  Jeffry Morgan RD  07/07/25 11:57 CDT

## 2025-07-08 LAB
ANION GAP SERPL CALCULATED.3IONS-SCNC: 12 MMOL/L (ref 5–15)
BUN SERPL-MCNC: 27.3 MG/DL (ref 8–23)
BUN/CREAT SERPL: 11.8 (ref 7–25)
CALCIUM SPEC-SCNC: 8.9 MG/DL (ref 8.6–10.5)
CHLORIDE SERPL-SCNC: 97 MMOL/L (ref 98–107)
CO2 SERPL-SCNC: 26 MMOL/L (ref 22–29)
CREAT SERPL-MCNC: 2.32 MG/DL (ref 0.76–1.27)
EGFRCR SERPLBLD CKD-EPI 2021: 28.2 ML/MIN/1.73
GLUCOSE SERPL-MCNC: 115 MG/DL (ref 65–99)
POTASSIUM SERPL-SCNC: 3.6 MMOL/L (ref 3.5–5.2)
SODIUM SERPL-SCNC: 135 MMOL/L (ref 136–145)

## 2025-07-08 PROCEDURE — 94799 UNLISTED PULMONARY SVC/PX: CPT

## 2025-07-08 PROCEDURE — 25010000002 HEPARIN (PORCINE) PER 1000 UNITS: Performed by: INTERNAL MEDICINE

## 2025-07-08 PROCEDURE — 94664 DEMO&/EVAL PT USE INHALER: CPT

## 2025-07-08 PROCEDURE — 97162 PT EVAL MOD COMPLEX 30 MIN: CPT | Performed by: PHYSICAL THERAPIST

## 2025-07-08 PROCEDURE — 80048 BASIC METABOLIC PNL TOTAL CA: CPT | Performed by: INTERNAL MEDICINE

## 2025-07-08 PROCEDURE — 25010000002 CEFTRIAXONE PER 250 MG: Performed by: INTERNAL MEDICINE

## 2025-07-08 RX ADMIN — Medication 150 MG: at 08:22

## 2025-07-08 RX ADMIN — ISOSORBIDE DINITRATE 10 MG: 10 TABLET ORAL at 18:02

## 2025-07-08 RX ADMIN — OXYCODONE HYDROCHLORIDE AND ACETAMINOPHEN 1 TABLET: 5; 325 TABLET ORAL at 14:43

## 2025-07-08 RX ADMIN — HEPARIN SODIUM 5000 UNITS: 5000 INJECTION INTRAVENOUS; SUBCUTANEOUS at 08:23

## 2025-07-08 RX ADMIN — ASPIRIN 81 MG: 81 TABLET, COATED ORAL at 08:22

## 2025-07-08 RX ADMIN — CEFTRIAXONE 2000 MG: 2 INJECTION, POWDER, FOR SOLUTION INTRAMUSCULAR; INTRAVENOUS at 18:02

## 2025-07-08 RX ADMIN — EMPAGLIFLOZIN 10 MG: 10 TABLET, FILM COATED ORAL at 08:23

## 2025-07-08 RX ADMIN — Medication 10 ML: at 08:37

## 2025-07-08 RX ADMIN — OXYCODONE HYDROCHLORIDE AND ACETAMINOPHEN 1 TABLET: 5; 325 TABLET ORAL at 20:19

## 2025-07-08 RX ADMIN — IPRATROPIUM BROMIDE 0.5 MG: 0.5 SOLUTION RESPIRATORY (INHALATION) at 19:28

## 2025-07-08 RX ADMIN — GABAPENTIN 300 MG: 300 CAPSULE ORAL at 20:19

## 2025-07-08 RX ADMIN — IPRATROPIUM BROMIDE 0.5 MG: 0.5 SOLUTION RESPIRATORY (INHALATION) at 14:28

## 2025-07-08 RX ADMIN — TAMSULOSIN HYDROCHLORIDE 0.4 MG: 0.4 CAPSULE ORAL at 20:19

## 2025-07-08 RX ADMIN — DOCUSATE SODIUM 50 MG AND SENNOSIDES 8.6 MG 2 TABLET: 8.6; 5 TABLET, FILM COATED ORAL at 08:22

## 2025-07-08 RX ADMIN — ISOSORBIDE DINITRATE 10 MG: 10 TABLET ORAL at 13:33

## 2025-07-08 RX ADMIN — HEPARIN SODIUM 5000 UNITS: 5000 INJECTION INTRAVENOUS; SUBCUTANEOUS at 20:19

## 2025-07-08 RX ADMIN — BUDESONIDE AND FORMOTEROL FUMARATE DIHYDRATE 2 PUFF: 160; 4.5 AEROSOL RESPIRATORY (INHALATION) at 19:30

## 2025-07-08 RX ADMIN — HYDRALAZINE HYDROCHLORIDE 50 MG: 50 TABLET ORAL at 16:53

## 2025-07-08 RX ADMIN — IPRATROPIUM BROMIDE 0.5 MG: 0.5 SOLUTION RESPIRATORY (INHALATION) at 06:16

## 2025-07-08 RX ADMIN — OXYCODONE HYDROCHLORIDE AND ACETAMINOPHEN 1 TABLET: 5; 325 TABLET ORAL at 05:01

## 2025-07-08 RX ADMIN — ISOSORBIDE DINITRATE 10 MG: 10 TABLET ORAL at 08:22

## 2025-07-08 RX ADMIN — Medication 10 ML: at 20:20

## 2025-07-08 RX ADMIN — HYDRALAZINE HYDROCHLORIDE 50 MG: 50 TABLET ORAL at 20:19

## 2025-07-08 RX ADMIN — LEVOTHYROXINE SODIUM 100 MCG: 0.1 TABLET ORAL at 05:06

## 2025-07-08 RX ADMIN — CARVEDILOL 6.25 MG: 6.25 TABLET, FILM COATED ORAL at 08:23

## 2025-07-08 RX ADMIN — CARVEDILOL 6.25 MG: 6.25 TABLET, FILM COATED ORAL at 18:02

## 2025-07-08 RX ADMIN — HYDRALAZINE HYDROCHLORIDE 50 MG: 50 TABLET ORAL at 08:23

## 2025-07-08 RX ADMIN — MONTELUKAST 10 MG: 10 TABLET, FILM COATED ORAL at 20:19

## 2025-07-08 RX ADMIN — PANTOPRAZOLE SODIUM 40 MG: 40 TABLET, DELAYED RELEASE ORAL at 05:06

## 2025-07-08 RX ADMIN — VALSARTAN 320 MG: 80 TABLET, FILM COATED ORAL at 08:22

## 2025-07-08 RX ADMIN — NIFEDIPINE 120 MG: 60 TABLET, FILM COATED, EXTENDED RELEASE ORAL at 08:22

## 2025-07-08 RX ADMIN — CLOPIDOGREL BISULFATE 75 MG: 75 TABLET, FILM COATED ORAL at 08:23

## 2025-07-08 RX ADMIN — IPRATROPIUM BROMIDE 0.5 MG: 0.5 SOLUTION RESPIRATORY (INHALATION) at 09:51

## 2025-07-08 RX ADMIN — ATORVASTATIN CALCIUM 10 MG: 10 TABLET, FILM COATED ORAL at 08:23

## 2025-07-08 RX ADMIN — BUDESONIDE AND FORMOTEROL FUMARATE DIHYDRATE 2 PUFF: 160; 4.5 AEROSOL RESPIRATORY (INHALATION) at 06:16

## 2025-07-08 RX ADMIN — CALCITRIOL CAPSULES 0.25 MCG 0.5 MCG: 0.25 CAPSULE ORAL at 08:23

## 2025-07-08 NOTE — PLAN OF CARE
"Goal Outcome Evaluation:    A+Ox4. Pt has been pleasant and cooperative. Somewhat hypertensive throughout the day. C/o pain in his \"right stub\", amputation site, given pain meds.                                          "

## 2025-07-08 NOTE — NURSING NOTE
Psychiatric  INPATIENT WOUND & OSTOMY CARE    Today's Date: 07/08/25    Patient Name: Ricardo Hugo  MRN: [id number]  CSN: [id number]  PCP: Nathan Zimmer MD  Attending Provider: Vidal Miramontes MD  Length of Stay: 3    Wound care consulted for possible wound to the left foot and right AKA site. Nursing has uploaded picture to chart. Patient was admitted with Sepsis on 7/5/2025. Patient is currently lying in bed with wife at bedside.     Patient has no open wounds at this time. Patient has an AKA to the right leg that was done on 6/24/25 by Dr Zabala. He is scheduled to see Vascular in their office on 7/24/25. Staples are intact and look clean. Patient was told at last appt with Dr Zabala to keep lopez clean and to wear his . Left foot has healed scabs and is scaly. Patient keeping his foot and leg moisturized will help.     Wound RN Orders (last 12 hours) (12h ago, onward)       Start     Ordered    07/08/25 1244  Silicone Border Dressing to Bony Prominences  Per Order Details        Comments: Apply silicone border foam dressing per protocol to sacral spine/left heel for protection. Nursing to change dressing every 3 days and PRN if soiled. Nursing is to peel back dressing with every assessment to assess skin underneath dressing. No barrier cream under dressing.    07/08/25 1244    07/08/25 1236  Use Repositioning Wedge to Position Patient  Continuous         07/08/25 1236    07/08/25 1228  Turn Patient  Now Then Every 2 Hours         07/08/25 1236    07/08/25 1228  Elevate Heels Off of Bed  Until Discontinued         07/08/25 1236    07/08/25 1228  Use Seat Cushion When Up In Chair  Continuous         07/08/25 1236    Unscheduled  Wound Care  As Needed      Question:  Wound Care Instructions  Answer:  Apply Moisture Barrier After Any Incontinence    07/08/25 1236          Patient and family educated on importance of turning and repositioning at frequent intervals to prevent skin  breakdown. They voiced understanding. All questions answered.       Inpatient wound care will continue to follow during hospital stay.  Please contact if any issues or concerns arise.     This document has been electronically signed by Amber Waggoner RN on 7/8/2025 12:36 CDT

## 2025-07-08 NOTE — THERAPY EVALUATION
Patient Name: Ricardo Hugo  : 1950    MRN: [id number]                              Today's Date: 2025       Admit Date: 2025    Visit Dx:     ICD-10-CM ICD-9-CM   1. Acute cystitis without hematuria  N30.00 595.0   2. Elevated troponin  R79.89 790.6   3. Confusion  R41.0 298.9   4. Impaired mobility [Z74.09]  Z74.09 799.89     Patient Active Problem List   Diagnosis    Chronic rhinitis    Essential hypertension    Resistant hypertension    Chronic diarrhea    Symptomatic anemia    Wellness examination    Peripheral arterial disease    IHD (ischemic heart disease)    Carotid stenosis    Stenosis of right carotid artery    Carotid stenosis, right    Diastolic dysfunction    CKD (chronic kidney disease) stage 5    Anemia due to chronic kidney disease    Copper deficiency    Low iron     CLL (chronic lymphocytic leukemia)    Perforation of left tympanic membrane    Mixed hyperlipidemia    Systolic murmur    Eustachian tube dysfunction, bilateral    Sensorineural hearing loss (SNHL), bilateral    Anticoagulated    Nasal septal deviation    Hypertrophy of inferior nasal turbinate    Unilateral recurrent inguinal hernia without obstruction or gangrene    Bilateral inguinal hernia without obstruction or gangrene    Sleep difficulties    Dermatitis associated with moisture    Proteinuria    Chronic diastolic (congestive) heart failure    Bradycardia    History of pneumonia, current treatment with cefdinir    Abnormal TSH    ESRD (end stage renal disease) on dialysis    Preop testing    Anemia, multifactorial to include chronic kidney disease, iron deficiency and possible bleed    Acute pain of left lower extremity    Hyperkalemia    Arterial occlusion    Thrombocytopenia    Severe protein-calorie malnutrition    Chronic heart failure with preserved ejection fraction (HFpEF)    Vascular occlusion    COPD (chronic obstructive pulmonary disease)    Sepsis    Elevated troponin    Coronary artery disease  involving native coronary artery of native heart without angina pectoris     Past Medical History:   Diagnosis Date    3-vessel CAD 08/11/2020    Allergic rhinitis     Anemia     Anxiety disorder 04/27/2020    Arthritis     Asymmetrical sensorineural hearing loss 06/28/2017    Atherosclerosis of native artery of both lower extremities with intermittent claudication 07/18/2019    Avascular necrosis of femoral head, left 07/11/2020    right hip after surgery    Carotid stenosis     Chronic mucoid otitis media     Chronic rhinitis     COPD (chronic obstructive pulmonary disease)     Coronary artery disease     HEART BYPASS 2004    Crohn's disease of large intestine with other complication 07/30/2020    Chronic diarrhea Colonoscopy July 2020 revealed mild patchy scattered hemosiderin staining with inflammation more so in rectosigmoid area.  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    Iron deficiency anemia 07/14/2020    Mixed hearing loss of left ear     PAD (peripheral artery disease) 08/11/2020    Perianal abscess     Pernicious anemia 08/17/2020    took shots but never diagnosed with b12 deficiency    Personal history of alcoholism 08/11/2020    quit drinking in 2013    Prostatic hypertrophy 08/11/2020    Sensorineural hearing loss     Sepsis with acute renal failure 09/15/2020    Shortness of breath 05/27/2021    Tinnitus     Ventricular tachycardia, nonsustained 07/14/2020    Weight loss 07/11/2020     Past Surgical History:   Procedure Laterality Date    ABOVE KNEE AMPUTATION Right 6/24/2025    Procedure: right above knee amputation;  Surgeon: Vj  MD Blayne;  Location: [facility] OR;  Service: Vascular;  Laterality: Right;    AORTOGRAM Right 4/25/2025    Procedure: RIGHT LOWER EXTREMITY ANGIOGRAM, SHOCKWAVE LITHOTRIPSY, BALLOON ANGIOPLASTY, MYNX CLOSURE;  Surgeon: Gil Pineda DO;  Location: [facility] HYBRID OR;  Service: Vascular;  Laterality: Right;    AORTOGRAM Left 5/22/2025    Procedure: LEFT LOWER EXTREMITY ANGIOGRAM, SHOCKWAVE LITHOTRIPSY, BALLOON ANGIOPLASTY, STENT PLACEMENT, MYNX CLOSURE;  Surgeon: Blayne Zabala MD;  Location: [facility] HYBRID OR;  Service: Vascular;  Laterality: Left;    AORTOGRAM Right 5/30/2025    Procedure: AORTOILIAC ANGIOGRAM WITH LEFT LOWER EXTREMITY ANGIOGRAM;  Surgeon: Gil Pineda DO;  Location: [facility] HYBRID OR;  Service: Vascular;  Laterality: Right;    AORTOGRAM Right 6/20/2025    Procedure: right lower extremity arteriogram, shockwave lithotripsy, balloon angioplasty, mynx closue;  Surgeon: Blayne Zabala MD;  Location: [facility] HYBRID OR;  Service: Vascular;  Laterality: Right;    ARTERY SURGERY  2021    right side on neck    CAROTID ENDARTERECTOMY Right 05/10/2021    Procedure: RIGHT CAROTID ENDARTERECTOMY WITH EEG;  Surgeon: Gil Pinead DO;  Location: [facility] HYBRID OR 12;  Service: Vascular;  Laterality: Right;    COLONOSCOPY N/A 07/02/2020    Procedure: COLONOSCOPY WITH ANESTHESIA;  Surgeon: Adrien Brewster MD;  Location: [facility] ENDOSCOPY;  Service: Gastroenterology;  Laterality: N/A;  pre op: diarrhea  post op: polyps  PCP: Joe Velasco MD    COLONOSCOPY N/A 10/13/2020    Procedure: COLONOSCOPY WITH ANESTHESIA;  Surgeon: Adrien Brewster MD;  Location: [facility] ENDOSCOPY;  Service: Gastroenterology;  Laterality: N/A;  Pre: Chronic Diarrhea, Crohn's  Post: AVM  Dr. Neftali Velasco  CO2 Inflation Used    COLONOSCOPY N/A 12/08/2023    Procedure: COLONOSCOPY WITH ANESTHESIA;  Surgeon: Adrien Brewster MD;  Location: [facility] ENDOSCOPY;  Service: Gastroenterology;  Laterality: N/A;  pre chrone's  disease  post sub optimal prep, polyp, chrone's      CORONARY ARTERY BYPASS GRAFT  2003    x3    ENDOSCOPY N/A 11/02/2021    Procedure: ESOPHAGOGASTRODUODENOSCOPY WITH ANESTHESIA;  Surgeon: Bridger Bell MD;  Location: [facility] ENDOSCOPY;  Service: Gastroenterology;  Laterality: N/A;  pre anemia;gi bleed  post  gi bleed;schatski ring  Dr. ERIC Velasco    ENDOSCOPY N/A 10/10/2023    Procedure: ESOPHAGOGASTRODUODENOSCOPY WITH ANESTHESIA;  Surgeon: Adrien Brewster MD;  Location: [facility] ENDOSCOPY;  Service: Gastroenterology;  Laterality: N/A;  preop; anemia  postop esophagitis ; R/O barretts   PCP Randall Beata    EYE SURGERY Bilateral     catorac    INCISION AND DRAINAGE PERIRECTAL ABSCESS N/A 03/03/2017    Procedure: INCISION AND DRAINAGE OF JEET ANAL ABSCESS;  Surgeon: Lynette Smith MD;  Location: [facility] OR;  Service:     INGUINAL HERNIA REPAIR Bilateral 06/27/2023    Procedure: INGUINAL HERNIA BILATERAL REPAIR LAPAROSCOPIC WITH DAVINCI ROBOT WITH MESH;  Surgeon: Tahira Rivera MD;  Location: [facility] OR;  Service: Robotics - DaVinci;  Laterality: Bilateral;    INSERTION HEMODIALYSIS CATHETER N/A 4/16/2025    Procedure: HEMODIALYSIS CATHETER PLACEMENT;  Surgeon: Gil Pineda DO;  Location: [facility] HYBRID OR;  Service: Vascular;  Laterality: N/A;    MYRINGOTOMY W/ TUBES Left 04/17/2017    06/10/2016    TONSILLECTOMY      TOTAL HIP ARTHROPLASTY Right 2006      General Information       Row Name 07/08/25 1523          Physical Therapy Time and Intention    Document Type evaluation (P)   Pt present to hospital w/ confusion and fever. PMH: known CAD post CABG in early 2000's (? 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  -AD     Mode of Treatment physical therapy (P)   -AD       Row Name 07/08/25 3765          General Information    Patient Profile Reviewed yes (P)   -AD     Prior  Level of Function independent:;bed mobility;transfer;bathing;mod assist:;dressing (P)   Pt reports that he has not been getting up at the SNF since AKA, grab bars, raised toilet, modifiable bed  -AD     Existing Precautions/Restrictions fall  (P)   R AKA,  -AD       Row Name 07/08/25 1523          Living Environment    Current Living Arrangements extended care facility (P)   -AD     People in Home facility resident (P)   -AD       Row Name 07/08/25 1523          Home Main Entrance    Number of Stairs, Main Entrance none (P)   -AD     Stair Railings, Main Entrance none (P)   -AD       Row Name 07/08/25 1523          Stairs Within Home, Primary    Number of Stairs, Within Home, Primary none (P)   -AD     Stair Railings, Within Home, Primary none (P)   -AD       Row Name 07/08/25 1523          Cognition    Orientation Status (Cognition) oriented x 4 (P)   -AD               User Key  (r) = Recorded By, (t) = Taken By, (c) = Cosigned By      Initials Name Provider Type    AD Yon Magaña, PT Student PT Student                   Mobility       Row Name 07/08/25 1523          Bed Mobility    Bed Mobility rolling left;rolling right;supine-sit;sit-supine;scooting/bridging (P)   -AD     Rolling Left Edgar (Bed Mobility) modified independence;verbal cues;nonverbal cues (demo/gesture) (P)   -AD     Rolling Right [place] (Bed Mobility) modified independence;verbal cues;nonverbal cues (demo/gesture) (P)   -AD     Scooting/Bridging Edgar (Bed Mobility) maximum assist (25% patient effort);2 person assist (P)   Performed in supine  -AD     Supine-Sit Edgar (Bed Mobility) modified independence;verbal cues (P)   -AD     Sit-Supine Edgar (Bed Mobility) modified independence;verbal cues (P)   -AD     Assistive Device (Bed Mobility) bed rails;head of bed elevated;repositioning sheet (P)   -AD       Row Name 07/08/25 1523          Bed-Chair Transfer    Bed-Chair Edgar (Transfers) minimum assist  (75% patient effort);1 person assist (P)   -AD       Row Name 07/08/25 1523          Gait/Stairs (Locomotion)    Patient was able to Ambulate no, other medical factors prevent ambulation (P)   -AD     Reason Patient was unable to Ambulate -- (P)   recent AKA and has not been amb since sx, pt not confident to try today  -AD     [place] Level (Stairs) not tested (P)   -AD               User Key  (r) = Recorded By, (t) = Taken By, (c) = Cosigned By      Initials Name Provider Type    AD Yon Magaña, PT Student PT Student                   Obj/Interventions       Row Name 07/08/25 1523          Range of Motion Comprehensive    General Range of Motion no range of motion deficits identified (P)   -AD       Row Name 07/08/25 1523          Strength Comprehensive (MMT)    General Manual Muscle Testing (MMT) Assessment lower extremity strength deficits identified (P)   -AD     Comment, General Manual Muscle Testing (MMT) Assessment LLE grossly 4/5, RLE hip flex 3+/5 (P)   -AD       Row Name 07/08/25 1523          Balance    Balance Assessment sitting static balance;sitting dynamic balance;standing static balance;standing dynamic balance (P)   -AD     Static Sitting Balance standby assist (P)   -AD     Dynamic Sitting Balance standby assist (P)   -AD     Position, Sitting Balance unsupported;sitting edge of bed (P)   -AD     Static Standing Balance minimal assist (P)   -AD     Dynamic Standing Balance minimal assist (P)   -AD       Row Name 07/08/25 1523          Sensory Assessment (Somatosensory)    Sensory Assessment (Somatosensory) LE sensation intact (P)   -AD               User Key  (r) = Recorded By, (t) = Taken By, (c) = Cosigned By      Initials Name Provider Type    Yon Flores, PT Student PT Student                   Goals/Plan       Row Name 07/08/25 1523          Bed Mobility Goal 1 (PT)    Activity/Assistive Device (Bed Mobility Goal 1, PT) rolling to left;rolling to right;sit to supine/supine to  sit;sidelying to sit/sit to sidelying (P)   -AD     [place] Level/Cues Needed (Bed Mobility Goal 1, PT) independent (P)   -AD     Time Frame (Bed Mobility Goal 1, PT) long term goal (LTG) (P)   -AD     Progress/Outcomes (Bed Mobility Goal 1, PT) new goal (P)   -AD       Row Name 07/08/25 1523          Transfer Goal 1 (PT)    Activity/Assistive Device (Transfer Goal 1, PT) sit-to-stand/stand-to-sit;bed-to-chair/chair-to-bed (P)   -AD     [place] Level/Cues Needed (Transfer Goal 1, PT) standby assist (P)   -AD     Time Frame (Transfer Goal 1, PT) long term goal (LTG) (P)   -AD     Progress/Outcome (Transfer Goal 1, PT) new goal (P)   -AD       Row Name 07/08/25 1523          Balance Goal 1 (PT)    Activity/Assistive Device (Balance Goal) sitting static balance;sitting dynamic balance;standing static balance (P)   -AD     [place] Level/Cues Needed (Balance Goal 1, PT) independent  -SB     Time Frame (Balance Goal 1, PT) long-term goal (LTG) (P)   -AD     Progress/Outcomes (Balance Goal 1, PT) goal ongoing (P)   -AD       Row Name 07/08/25 1523          Therapy Assessment/Plan (PT)    Planned Therapy Interventions (PT) balance training;bed mobility training;patient/family education;gait training;transfer training;strengthening (P)   -AD               User Key  (r) = Recorded By, (t) = Taken By, (c) = Cosigned By      Initials Name Provider Type    Raiza Michael, PT DPT Physical Therapist    Yon Flores, PT Student PT Student                   Clinical Impression       Row Name 07/08/25 1523          Pain    Pretreatment Pain Rating 0/10 - no pain (P)   -AD     Posttreatment Pain Rating 0/10 - no pain (P)   -AD       Row Name 07/08/25 1523          Plan of Care Review    Plan of Care Reviewed With patient (P)   -AD     Progress no change (P)   -AD     Outcome Evaluation PT eval completed. Pt is AOx4, seen in [facility] upon arrival. Pt reports that while at [facility] he has not been amb any and has only  participated in bed to chair transfers. Pt reports BLE sensation is intact and symmetrical. Pt demos LLE mm strength grossly 4/5, RLE hip flex 3+/5. Pt performed bed mob w/ mod indep. Pt demo'd sitting balance w/ SBA. Pt performed squat pivot transfer to the chair and back to the bed w/ min A x1 and VC. Pt standing balance was performed with min A x1. Pt returned to bed and was dependently scooted up in the bed. Pt reported 0 pain prior to eval and 0 pain at the conclusion of the eval. Skilled therapy is indiciated to continue strengthening, endurance, and increase his confidence & independence w/ transfers. Anticipated d/c back to SNF. (P)   -AD       Row Name 07/08/25 1523          Therapy Assessment/Plan (PT)    Rehab Potential (PT) good (P)   -AD     Criteria for Skilled Interventions Met (PT) yes;meets criteria;skilled treatment is necessary (P)   -AD     Therapy Frequency (PT) daily (P)   -AD       Row Name 07/08/25 1523          Vital Signs    O2 Delivery Pre Treatment room air (P)   -AD     O2 Delivery Intra Treatment room air (P)   -AD     O2 Delivery Post Treatment room air (P)   -AD       Row Name 07/08/25 1523          Positioning and Restraints    Pre-Treatment Position in bed (P)   -AD     Post Treatment Position bed (P)   -AD     In Bed fowlers;call light within reach;encouraged to call for assist;exit alarm on;side rails up x2 (P)   -AD               User Key  (r) = Recorded By, (t) = Taken By, (c) = Cosigned By      Initials Name Provider Type    Yon Flores, PT Student PT Student                   Outcome Measures       Row Name 07/08/25 1523 07/08/25 0842       How much help from another person do you currently need...    Turning from your back to your side while in flat bed without using bedrails? 3 (P)   -AD 3  -DW    Moving from lying on back to sitting on the side of a flat bed without bedrails? 3 (P)   -AD 3  -DW    Moving to and from a bed to a chair (including a wheelchair)? 3 (P)    -AD 2  -DW    Standing up from a chair using your arms (e.g., wheelchair, bedside chair)? 3 (P)   -AD 2  -DW    Climbing 3-5 steps with a railing? 2 (P)   -AD 2  -DW    To walk in hospital room? 2 (P)   -AD 2  -DW    AM-PAC 6 Clicks Score (PT) 16 (P)   -AD 14  -DW    Highest Level of Mobility Goal Stand (1 or More Minutes)-5 (P)   -AD Move to Chair/Commode-4  -DW      Row Name 07/08/25 1523          Functional Assessment    Outcome Measure Options AM-PAC 6 Clicks Basic Mobility (PT) (P)   -AD               User Key  (r) = Recorded By, (t) = Taken By, (c) = Cosigned By      Initials Name Provider Type    AD Yon Magaña, PT Student PT Student    Nav Levy, RN Registered Nurse                                 Physical Therapy Education       Title: PT OT SLP Therapies (In Progress)       Topic: Physical Therapy (In Progress)       Point: Mobility training (Done)       Learning Progress Summary            Patient Acceptance, E, VU by SB at 7/8/2025 1608    Comment: pt edu on POC, benefits of act and d/c plans                      Point: Home exercise program (Not Started)       Learner Progress:  Not documented in this visit.              Point: Body mechanics (Not Started)       Learner Progress:  Not documented in this visit.              Point: Precautions (Done)       Learning Progress Summary            Patient Acceptance, E, VU by SB at 7/8/2025 1608    Comment: pt edu on POC, benefits of act and d/c plans                                      User Key       Initials Effective Dates Name Provider Type Discipline    SB 07/11/23 -  Raiza Cancino, PT DPT Physical Therapist PT                  PT Recommendation and Plan  Planned Therapy Interventions (PT): (P) balance training, bed mobility training, patient/family education, gait training, transfer training, strengthening  Progress: (P) no change  Outcome Evaluation: (P) PT eval completed. Pt is AOx4, seen in [facility] upon arrival. Pt reports that while at  SNF he has not been amb any and has only participated in bed to chair transfers. Pt reports BLE sensation is intact and symmetrical. Pt demos LLE mm strength grossly 4/5, RLE hip flex 3+/5. Pt performed bed mob w/ mod indep. Pt demo'd sitting balance w/ SBA. Pt performed squat pivot transfer to the chair and back to the bed w/ min A x1 and VC. Pt standing balance was performed with min A x1. Pt returned to bed and was dependently scooted up in the bed. Pt reported 0 pain prior to eval and 0 pain at the conclusion of the eval. Skilled therapy is indiciated to continue strengthening, endurance, and increase his confidence & independence w/ transfers. Anticipated d/c back to SNF.     Time Calculation:         PT Charges       Row Name 07/08/25 1622             Time Calculation    Start Time 1525 (P)   add 3 mins for chart review  -AD      Stop Time 1615 (P)   -AD      Time Calculation (min) 50 min (P)   -AD      PT Received On 07/08/25 (P)   -AD      PT Goal Re-Cert Due Date 07/18/25 (P)   -AD                User Key  (r) = Recorded By, (t) = Taken By, (c) = Cosigned By      Initials Name Provider Type    Yon Flores PT Student PT Student                      PT G-Codes  Outcome Measure Options: (P) AM-PAC 6 Clicks Basic Mobility (PT)  AM-PAC 6 Clicks Score (PT): (P) 16  PT Discharge Summary  Anticipated Discharge Disposition (PT): (P) skilled nursing facility    Yon Reese PT Student  7/8/2025

## 2025-07-08 NOTE — PROGRESS NOTES
[facility] Medicine Services  INPATIENT PROGRESS NOTE    Patient Name: Ricardo Hugo  Date of Admission: 7/5/2025  Today's Date: 07/08/25  Length of Stay: 3  Primary Care Physician: Nathan Zimmer MD    Subjective   Patient is a 75-year-old vasculopath with known CAD post CABG in early 2000's (? 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  Currently patient is at a skilled nursing facility after recent surgery.  Patient typically gets dialysis on a Monday Wednesday and Friday.  Apparently had missed dialysis yesterday due to transport error.  Patient went for dialysis today and then after dialysis was noted to be confused with a temperature of 105.  Due to that he was sent to our ER.  Here in the ER he has a fever of 103 which is come down with Tylenol.  He has a white count of 21.  Blood pressure is stable.  Urinalysis concerning for possible infection.  I saw him in ER 40 with family at bedside.  He was asleep but easily awoken.  He is interactive.  He is mildly confused but appears to be answering questions appropriately.  He knew where he was but did not know the month or the year.  He says he feels okay at this time.  Denies any cough or phlegm.  Denies any chest pain or shortness of breath.  No abdominal pain or nausea.  No new focal numbness or weakness.  Urine and blood cultures have been sent from the ER.  He has been given a dose of ceftriaxone.  We have been asked admit for further care   7/6  As before history of end-stage renal disease on dialysis history of peripheral vascular disease coronary artery disease that is post CABG presented with sepsis confusion felt secondary to metabolic encephalopathy was found to have urosepsis blood culture is pending so far noted on Rocephin cardiac enzymes were elevated cardiology has been consulted his last  echo on January 2025 was showing EF to be 51%  7/7  As before admitted for sepsis blood culture remains unremarkable urine culture unremarkable continue current antibiotics going for dialysis appreciate nephrology appreciate cardiology Elevated troponin felt to be secondary to comorbidities (ESRD, vascular disease, anemia) and UTI. Plan for ischemic eval outpatient. Continue ASA, Plavix, lipitor, coreg, and isordil   7/8  As before had dialysis yesterday urine culture showing gram-negative bacilli sensitivities pending respiratory panel is unremarkable blood culture is unremarkable to date continue current treatment  Review of Systems   All other systems reviewed and are negative.     All pertinent negatives and positives are as above. All other systems have been reviewed and are negative unless otherwise stated.     Objective    Temp:  [97.7 °F (36.5 °C)-99.6 °F (37.6 °C)] 98.3 °F (36.8 °C)  Heart Rate:  [73-90] 73  Resp:  [16-18] 16  BP: (115-150)/(42-94) 150/48  Physical Exam  HENT:      Head: Normocephalic.      Nose: Nose normal.   Eyes:      Pupils: Pupils are equal, round, and reactive to light.   Cardiovascular:      Rate and Rhythm: Normal rate.   Pulmonary:      Effort: Pulmonary effort is normal.   Abdominal:      General: Abdomen is flat.   Musculoskeletal:      Cervical back: Normal range of motion.      Comments: R aka    Skin:     Capillary Refill: Capillary refill takes less than 2 seconds.   Neurological:      Mental Status: He is alert.           Results Review:  I have reviewed the labs, radiology results, and diagnostic studies.    Laboratory Data:   Results from last 7 days   Lab Units 07/07/25  0410 07/06/25  0537 07/05/25  1756   WBC 10*3/mm3 16.96* 22.70* 20.93*   HEMOGLOBIN g/dL 8.6* 8.7* 7.7*   HEMATOCRIT % 27.0* 27.4* 23.4*   PLATELETS 10*3/mm3 157 159 170        Results from last 7 days   Lab Units 07/08/25  0506 07/07/25  0410 07/06/25  0537 07/05/25  1756   SODIUM mmol/L 135* 134* 132*  "135*   POTASSIUM mmol/L 3.6 3.9 3.7 3.7   CHLORIDE mmol/L 97* 100 97* 99   CO2 mmol/L 26.0 22.0 25.0 24.0   BUN mg/dL 27.3* 42.9* 33.9* 29.6*   CREATININE mg/dL 2.32* 3.10* 2.47* 2.02*   CALCIUM mg/dL 8.9 9.0 8.5* 8.4*   BILIRUBIN mg/dL  --  0.2 0.4 0.3   ALK PHOS U/L  --  268* 312* 322*   ALT (SGPT) U/L  --  29 48* 39   AST (SGOT) U/L  --  23 51* 46*   GLUCOSE mg/dL 115* 96 94 103*       Culture Data:   No results found for: \"BLOODCX\", \"URINECX\", \"WOUNDCX\", \"MRSACX\", \"RESPCX\", \"STOOLCX\"    Radiology Data:   Imaging Results (Last 24 Hours)       ** No results found for the last 24 hours. **            I have reviewed the patient's current medications.     Assessment/Plan   Assessment  Active Hospital Problems    Diagnosis     **Sepsis     Elevated troponin     Coronary artery disease involving native coronary artery of native heart without angina pectoris     ESRD (end stage renal disease) on dialysis     Chronic diastolic (congestive) heart failure     Anemia due to chronic kidney disease     Peripheral arterial disease     IHD (ischemic heart disease)     Essential hypertension        Treatment Plan  #1 sepsis -patient presenting with altered mental status and a fever of 103.  WBC of 20K.  Urinalysis seems abnormal and possibly indicative of UTI but this is a dialysis patient and potentially may have chronic abnormal urine.  Fever did seem to occur after access of his dialysis catheter.  Other differential would be a catheter line infection but there is no obvious erythema or loculation at catheter site.  Urine and blood cultures have been sent.  Continue ceftriaxone 2 g every 24 hours and monitor cultures.  Currently pressure is stable, given he is a dialysis patient with a history of diastolic CHF will avoid fluid bolus at this time.     #2 urinalysis -likely UTI given concern for sepsis and mental status changes.  Continue ceftriaxone.  Follow cultures.     #3 confusion/altered mental status -suspect metabolic " encephalopathy in the setting of #1 and 2 above.  Treat underlying infections and monitor.     #4 NSTEMI/CAD -patient is a known vasculopath with prior CABG at least 20 years ago per report.  Do not see any recent ischemic evaluation.  Had a troponin of 147 on arrival which increased to 176.  He is a dialysis patient which could lead to elevated labs.  He is denying any chest pain.  That being said he has EKG changes showing lateral ST-T segment depressions in 1 as well as V4 through 6.  Note he had some similar changes in V4 through V6 back on his EKG in January but not to the extent of current depressions.  Again he is chest pain-free.  Trend troponin.  Recheck another EKG at next troponin check.  Cardiology was called from the ER and aware of patient.  Continue beta-blocker, Imdur, aspirin, statin, Plavix.  DVT prophylaxis heparin.  Discussed with Dr. Raines.     #5 ESRD -next dialysis is due Monday 7/7.  Will consult nephrology tomorrow to help prepare for planned dialysis session.  Monitor blood cultures given concern of fever and events after catheter access.  Catheter line infection remains in differential.     #6 anemia of chronic disease -H&H similar to last hospitalization.  Patient did require some PRBC transfusion last hospital stay.  Monitor closely.  Continue oral iron.     #7 chronic diastolic CHF -currently appears euvolemic without signs of overload.  Status post dialysis earlier today.  Monitor.     #8 history of constipation -bowel regimen        Medical Decision Making  Number and Complexity of problems: 3-4 acute, multiple chronic     Differential Diagnosis: as above     Conditions and Status        New to me this stay, septic and being admitted to hospital for further care     MDM Data  External documents reviewed: none  Cardiac tracing (EKG, telemetry) interpretation: sinus with some lateral st segment depressions  Radiology interpretation: reports reviewed  Labs reviewed: as above  Any  tests that were considered but not ordered: none     Decision rules/scores evaluated (example [id number], Wells, etc): none     Discussed with: patient, family, ER provider, Dr. Raines     Care Planning  Shared decision making: Patient apprised of current labs, vitals, imaging and treatment plan.  They are agreeable with proceeding with plans as discussed.     Code status and discussions: full code     Disposition  Social Determinants of Health that impact treatment or disposition: none  Estimated length of stay is 2+ days.      I confirmed that the patient's advanced care plan is present, code status is documented, and a surrogate decision maker is listed in the patient's medical record.      The patient's surrogate decision maker is his daughter Kathleen       Electronically signed by Vidal Miramontes MD, 07/08/25, 10:49 CDT.

## 2025-07-08 NOTE — PLAN OF CARE
Anticoagulation Progress Note    Indication: DVT/PE  Goal INR: 2 - 3  INR Result: 2.4    49 year old female returns to the [facility] for a follow-up visit after 1 week.    Has Ob-Gyn endometrial biopsy scheduled for next Weds 6/14.     Pt finished her medroxyprogesterone for total of 1 month, last dose was actually on 5/31 per pt. INR remains in range upon discontinuing medroxyprogesterone.    This Mon she started to have vaginal bleeding again, not saturating a pad, but more than spotting occasional cramping. Msg'd Dr. Page if pt should resume back on medroxyprogesterone (if so will need a refill per pt). Informed pt will update her once we hear back, aware if any severe bleeding  Needs to seek medical attention.     Assessment  (+) bleeding, bruising or thromboembolic complications  (-) missed/extra warfarin doses  (+) medication changes  (-) dietary changes  (-) alcohol changes  (-) smoking changes  (-) activity level changes  (-) fluid status changes  (-) hospital visits, ER visits, interruptions in therapy  (-) illness/infection, injuries, or falls    Plan   -Pt's INR=2.4 today, which is within the desired therapeutic range of 2 - 3.  -Pt's INR has increased from 2.3 at last visit.  -Pt has been taking warfarin 27.5 mg weekly.  -Plan to have the pt continue with the current warfarin regimen.   -Pt to return to clinic next week, prefers to come since will be in area 6/14/23.    Ana Selby, PharmD  [facility]  Anticoagulation Clinic  Ph [phone]   Goal Outcome Evaluation:  Plan of Care Reviewed With: (P) patient        Progress: (P) no change  Outcome Evaluation: (P) PT eval completed. Pt is AOx4, seen in [place] upon arrival. Pt reports that while at SNF he has not been amb any and has only participated in bed to chair transfers. Pt reports BLE sensation is intact and symmetrical. Pt demos LLE mm strength grossly 4/5, RLE hip flex 3+/5. Pt performed bed mob w/ mod indep. Pt demo'd sitting balance w/ SBA. Pt performed squat pivot transfer to the chair and back to the bed w/ min A x1 and VC. Pt standing balance was performed with min A x1. Pt returned to bed and was dependently scooted up in the bed. Pt reported 0 pain prior to eval and 0 pain at the conclusion of the eval. Skilled therapy is indiciated to continue strengthening, endurance, and increase his confidence & independence w/ transfers. Anticipated d/c back to SNF.    Anticipated Discharge Disposition (PT): (P) skilled nursing facility

## 2025-07-08 NOTE — PROGRESS NOTES
Nephrology ([facility]) Progress Note      Patient:  Ricardo Hugo  YOB: 1950  Date of Service: 7/8/2025  MRN: [id number]   Acct: [id number]   Primary Care Physician: Nathan Zimmer MD  Advance Directive:   Code Status and Medical Interventions: CPR (Attempt to Resuscitate); Full Support   Ordered at: 07/05/25 2124     Code Status (Patient has no pulse and is not breathing):    CPR (Attempt to Resuscitate)     Medical Interventions (Patient has pulse or is breathing):    Full Support     Level Of Support Discussed With:    Patient     Admit Date: 7/5/2025       Hospital Day: 3  Referring Provider: No Known Provider      Patient personally seen and examined.  Complete chart including Consults, Notes, Operative Reports, Labs, Cardiology, and Radiology studies reviewed as able.        Subjective:  Ricardo Hugo is a 75 y.o. male for whom we were consulted for evaluation and treatment of end stage renal disease on hemodialysis Monday Wednesday Friday. History of diastolic CHF, CAD with prior CABG, hypertension. Recent admission to [facility] and had right AKA. Discharged to rehab facility on 7/03.   Returned to ER on 7/05 due to findings of confusion and fever of 105 while at outpatient dialysis clinic. Denied cough, abdominal pain, dysuria. Some confusion on initial nephrology exam. Tolerated dialysis well on 7/07    Today is awake and alert. No new complaint or overnight issues.    Allergies:  Ondansetron, Zofran [ondansetron hcl], Lortab [hydrocodone-acetaminophen], and Allopurinol    Home Meds:  Medications Prior to Admission   Medication Sig Dispense Refill Last Dose/Taking    aspirin 81 MG chewable tablet Chew 1 tablet Daily.   Taking    atorvastatin (LIPITOR) 10 MG tablet Take 1 tablet by mouth Daily. 90 tablet 3 Taking    Budeson-Glycopyrrol-Formoterol (Breztri Aerosphere) 160-9-4.8 MCG/ACT aerosol inhaler Inhale 2 puffs 2 (Two) Times a Day.   Taking    calcitriol  (ROCALTROL) 0.5 MCG capsule Take 1 capsule by mouth Daily. 90 capsule 2 Taking    carvedilol (COREG) 12.5 MG tablet Take 1 tablet by mouth 2 (Two) Times a Day With Meals.   Taking    clopidogrel (PLAVIX) 75 MG tablet Take 1 tablet by mouth Daily.   Taking    empagliflozin (Jardiance) 10 MG tablet tablet Take 1 tablet by mouth Daily.   Taking    esomeprazole (nexIUM) 20 MG capsule Take 1 capsule by mouth Every Morning Before Breakfast.   Taking    gabapentin (NEURONTIN) 300 MG capsule Take 1 capsule by mouth Every Night for 24 doses.   Taking    hydrALAZINE (APRESOLINE) 100 MG tablet Take 0.5 tablets by mouth 3 (Three) Times a Day.   Taking    iron polysaccharides (NIFEREX) 150 MG capsule Take 1 capsule by mouth Daily.   Taking    isosorbide dinitrate (ISORDIL) 10 MG tablet Take 1 tablet by mouth 3 (Three) Times a Day. 270 tablet 2 Taking    levothyroxine (Synthroid) 100 MCG tablet Take 1 tablet by mouth Every Morning. 90 tablet 2 Taking    magnesium chloride-calcium (SLOW MAGNESIUM/CALCIUM)  MG tablet delayed-release ec tablet Take 1 tablet by mouth Daily.   Taking    montelukast (SINGULAIR) 10 MG tablet Take 1 tablet by mouth Every Night.   Taking    Multiple Vitamins-Minerals (PRESERVISION/LUTEIN) capsule Take 1 capsule by mouth 2 (two) times a day.   Taking    NIFEdipine XL (ADALAT CC) 60 MG 24 hr tablet Take 2 tablets by mouth Daily.   Taking    oxyCODONE-acetaminophen (PERCOCET) 5-325 MG per tablet Take 1 tablet by mouth Every 6 (Six) Hours As Needed for Moderate Pain for up to 24 doses. 24 tablet 0 7/5/2025    polyethylene glycol (MIRALAX) 17 g packet Take 17 g by mouth Daily. For constipation   Taking    tamsulosin (FLOMAX) 0.4 MG capsule 24 hr capsule Take 1 capsule by mouth Every Night.   Taking    valsartan (DIOVAN) 320 MG tablet Take 1 tablet by mouth Daily.   Taking    vitamin D (ERGOCALCIFEROL) 1.25 MG (05638 UT) capsule capsule Take 1 capsule by mouth 1 (One) Time Per Week. Mondays   Taking     acetaminophen (TYLENOL) 325 MG tablet Take 2 tablets by mouth Every 4 (Four) Hours As Needed for Mild Pain.   Unknown    albuterol sulfate  (90 Base) MCG/ACT inhaler Inhale 2 puffs Every 6 (Six) Hours As Needed for Wheezing or Shortness of Air.   Unknown    bisacodyl (DULCOLAX) 10 MG suppository Insert 1 suppository into the rectum Daily As Needed for Constipation.   Unknown    desoximetasone (TOPICORT) 0.25 % cream Apply 1 Application topically to the appropriate area as directed 2 (Two) Times a Day As Needed for Irritation. irritation 60 g 0     docusate sodium (COLACE) 100 MG capsule Take 1 capsule by mouth 3 (Three) Times a Day As Needed for Constipation.   Unknown    fluticasone (FLONASE) 50 MCG/ACT nasal spray Administer 2 sprays into the nostril(s) as directed by provider Daily As Needed for Allergies.   Unknown    naloxone (NARCAN) 4 MG/0.1ML nasal spray Call 911. Don't prime. [place] in 1 nostril for overdose. Repeat in 2-3 minutes in other nostril if no or minimal breathing/responsiveness. 2 each 0 Unknown    senna 8.6 MG tablet Take 2 tablets by mouth Daily As Needed for Constipation.   Unknown       Medicines:  Current Facility-Administered Medications   Medication Dose Route Frequency Provider Last Rate Last Admin    acetaminophen (TYLENOL) tablet 650 mg  650 mg Oral Q4H PRN Dc Issa DO        albuterol (ACCUNEB) nebulizer solution 0.63 mg  0.63 mg Nebulization Q6H PRN Vidal Miramontes MD        aspirin EC tablet 81 mg  81 mg Oral Daily Dc Issa DO   81 mg at 07/08/25 0822    atorvastatin (LIPITOR) tablet 10 mg  10 mg Oral Daily Dc Issa DO   10 mg at 07/08/25 0823    sennosides-docusate (PERICOLACE) 8.6-50 MG per tablet 2 tablet  2 tablet Oral BID Dc Issa DO   2 tablet at 07/08/25 0822    And    polyethylene glycol (MIRALAX) packet 17 g  17 g Oral Daily PRN Dc Issa DO        And    bisacodyl (DULCOLAX) EC tablet 5 mg  5 mg Oral Daily PRN Dc Issa  DO DEBBY        And    bisacodyl (DULCOLAX) suppository 10 mg  10 mg Rectal Daily PRN Dc Issa DO        budesonide-formoterol (SYMBICORT) 160-4.5 MCG/ACT inhaler 2 puff  2 puff Inhalation BID - RT Dc Issa DO   2 puff at 07/08/25 0616    And    ipratropium (ATROVENT) nebulizer solution 0.5 mg  0.5 mg Nebulization 4x Daily - RT Dc Issa DO   0.5 mg at 07/08/25 0951    calcitriol (ROCALTROL) capsule 0.5 mcg  0.5 mcg Oral Daily Dc Issa DO   0.5 mcg at 07/08/25 0823    carvedilol (COREG) tablet 6.25 mg  6.25 mg Oral BID With Meals Dc Issa DO   6.25 mg at 07/08/25 0823    cefTRIAXone (ROCEPHIN) 2,000 mg in sodium chloride 0.9 % 100 mL MBP  2,000 mg Intravenous Q24H Dc Issa  mL/hr at 07/07/25 1806 2,000 mg at 07/07/25 1806    clopidogrel (PLAVIX) tablet 75 mg  75 mg Oral Daily Dc Issa DO   75 mg at 07/08/25 0823    empagliflozin (JARDIANCE) tablet 10 mg  10 mg Oral Daily Vidal Miramontes MD   10 mg at 07/08/25 0823    epoetin cecile-epbx (RETACRIT) injection 10,000 Units  10,000 Units Subcutaneous Once per day on Monday Wednesday Friday Giuliano Saldana MD   10,000 Units at 07/07/25 1432    fluticasone (FLONASE) 50 MCG/ACT nasal spray 2 spray  2 spray Nasal Daily PRN Vidal Miramontes MD        gabapentin (NEURONTIN) capsule 300 mg  300 mg Oral Nightly Dc Issa DO   300 mg at 07/07/25 2039    heparin (porcine) 5000 UNIT/ML injection 5,000 Units  5,000 Units Subcutaneous Q12H Dc Issa DO   5,000 Units at 07/08/25 0823    heparin (porcine) injection 3,200 Units  3,200 Units Intracatheter PRN Twan Quiroz MD   3,200 Units at 07/07/25 1201    hydrALAZINE (APRESOLINE) tablet 50 mg  50 mg Oral TID Vidal Miramontes MD   50 mg at 07/08/25 0823    iron polysaccharides (NIFEREX) capsule 150 mg  150 mg Oral Daily Dc Issa DO   150 mg at 07/08/25 0822    isosorbide dinitrate (ISORDIL) tablet 10 mg  10 mg Oral TID - Nitrates Maegan  Dc GUARDADO DO   10 mg at 07/08/25 0822    levothyroxine (SYNTHROID, LEVOTHROID) tablet 100 mcg  100 mcg Oral Q AM Dc Issa DO   100 mcg at 07/08/25 0506    montelukast (SINGULAIR) tablet 10 mg  10 mg Oral Nightly Dc Issa DO   10 mg at 07/07/25 2040    NIFEdipine XL (PROCARDIA XL) 24 hr tablet 120 mg  120 mg Oral Daily Vidal Miramontes MD   120 mg at 07/08/25 0822    oxyCODONE-acetaminophen (PERCOCET) 5-325 MG per tablet 1 tablet  1 tablet Oral Q6H PRN Dc Issa DO   1 tablet at 07/08/25 0501    pantoprazole (PROTONIX) EC tablet 40 mg  40 mg Oral Q AM Dc Issa DO   40 mg at 07/08/25 0506    sodium chloride 0.9 % flush 10 mL  10 mL Intravenous Q12H Dc Issa DO   10 mL at 07/08/25 0837    sodium chloride 0.9 % flush 10 mL  10 mL Intravenous PRN Dc Issa DO        sodium chloride 0.9 % infusion 40 mL  40 mL Intravenous PRN Dc Issa DO        tamsulosin (FLOMAX) 24 hr capsule 0.4 mg  0.4 mg Oral Nightly Vidal Miramontes MD   0.4 mg at 07/07/25 2039    valsartan (DIOVAN) tablet 320 mg  320 mg Oral Daily Vidal Miramontes MD   320 mg at 07/08/25 0822       Past Medical History:  Past Medical History:   Diagnosis Date    3-vessel CAD 08/11/2020    Allergic rhinitis     Anemia     Anxiety disorder 04/27/2020    Arthritis     Asymmetrical sensorineural hearing loss 06/28/2017    Atherosclerosis of native artery of both lower extremities with intermittent claudication 07/18/2019    Avascular necrosis of femoral head, left 07/11/2020    right hip after surgery    Carotid stenosis     Chronic mucoid otitis media     Chronic rhinitis     COPD (chronic obstructive pulmonary disease)     Coronary artery disease     HEART BYPASS 2004    Crohn's disease of large intestine with other complication 07/30/2020    Chronic diarrhea Colonoscopy July 2020 revealed mild patchy scattered hemosiderin staining with inflammation more so in rectosigmoid area.  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    Iron deficiency anemia 07/14/2020    Mixed hearing loss of left ear     PAD (peripheral artery disease) 08/11/2020    Perianal abscess     Pernicious anemia 08/17/2020    took shots but never diagnosed with b12 deficiency    Personal history of alcoholism 08/11/2020    quit drinking in 2013    Prostatic hypertrophy 08/11/2020    Sensorineural hearing loss     Sepsis with acute renal failure 09/15/2020    Shortness of breath 05/27/2021    Tinnitus     Ventricular tachycardia, nonsustained 07/14/2020    Weight loss 07/11/2020       Past Surgical History:  Past Surgical History:   Procedure Laterality Date    ABOVE KNEE AMPUTATION Right 6/24/2025    Procedure: right above knee amputation;  Surgeon: Blayne Zabala MD;  Location:  [facility] OR;  Service: Vascular;  Laterality: Right;    AORTOGRAM Right 4/25/2025    Procedure: RIGHT LOWER EXTREMITY ANGIOGRAM, SHOCKWAVE LITHOTRIPSY, BALLOON ANGIOPLASTY, MYNX CLOSURE;  Surgeon: Gil Pineda DO;  Location:  [facility] HYBRID OR;  Service: Vascular;  Laterality: Right;    AORTOGRAM Left 5/22/2025    Procedure: LEFT LOWER EXTREMITY ANGIOGRAM, SHOCKWAVE LITHOTRIPSY, BALLOON ANGIOPLASTY, STENT PLACEMENT, MYNX CLOSURE;  Surgeon: Blayne Zabala MD;  Location:  [facility] HYBRID OR;  Service: Vascular;  Laterality: Left;    AORTOGRAM Right 5/30/2025    Procedure: AORTOILIAC ANGIOGRAM WITH LEFT LOWER EXTREMITY ANGIOGRAM;  Surgeon: Gil Pineda DO;  Location:  [facility] HYBRID OR;  Service: Vascular;  Laterality: Right;    AORTOGRAM Right 6/20/2025    Procedure: right lower extremity arteriogram, shockwave  lithotripsy, balloon angioplasty, mynx closue;  Surgeon: Blayne Zabala MD;  Location: [facility] HYBRID OR;  Service: Vascular;  Laterality: Right;    ARTERY SURGERY  2021    right side on neck    CAROTID ENDARTERECTOMY Right 05/10/2021    Procedure: RIGHT CAROTID ENDARTERECTOMY WITH EEG;  Surgeon: Gil Pineda DO;  Location: [facility] HYBRID OR 12;  Service: Vascular;  Laterality: Right;    COLONOSCOPY N/A 07/02/2020    Procedure: COLONOSCOPY WITH ANESTHESIA;  Surgeon: Adrien Brewster MD;  Location: [facility] ENDOSCOPY;  Service: Gastroenterology;  Laterality: N/A;  pre op: diarrhea  post op: polyps  PCP: Joe Velasco MD    COLONOSCOPY N/A 10/13/2020    Procedure: COLONOSCOPY WITH ANESTHESIA;  Surgeon: Adrien Brewster MD;  Location: [facility] ENDOSCOPY;  Service: Gastroenterology;  Laterality: N/A;  Pre: Chronic Diarrhea, Crohn's  Post: AVM  Dr. Neftali Velasco  CO2 Inflation Used    COLONOSCOPY N/A 12/08/2023    Procedure: COLONOSCOPY WITH ANESTHESIA;  Surgeon: Adrien Brewster MD;  Location: [facility] ENDOSCOPY;  Service: Gastroenterology;  Laterality: N/A;  pre chrone's disease  post sub optimal prep, polyp, chrone's      CORONARY ARTERY BYPASS GRAFT  2003    x3    ENDOSCOPY N/A 11/02/2021    Procedure: ESOPHAGOGASTRODUODENOSCOPY WITH ANESTHESIA;  Surgeon: Bridger Bell MD;  Location: [facility] ENDOSCOPY;  Service: Gastroenterology;  Laterality: N/A;  pre anemia;gi bleed  post  gi bleed;schatski ring  Dr. ERIC Velasco    ENDOSCOPY N/A 10/10/2023    Procedure: ESOPHAGOGASTRODUODENOSCOPY WITH ANESTHESIA;  Surgeon: Adrien Brewster MD;  Location: [facility] ENDOSCOPY;  Service: Gastroenterology;  Laterality: N/A;  preop; anemia  postop esophagitis ; R/O barretts   PCP Randall Beata    EYE SURGERY Bilateral     catorac    INCISION AND DRAINAGE PERIRECTAL ABSCESS N/A 03/03/2017    Procedure: INCISION AND DRAINAGE OF JEET ANAL ABSCESS;  Surgeon: Lynette Smith MD;  Location: [facility] OR;  Service:      INGUINAL HERNIA REPAIR Bilateral 2023    Procedure: INGUINAL HERNIA BILATERAL REPAIR LAPAROSCOPIC WITH DAVINCI ROBOT WITH MESH;  Surgeon: Tahira Rivera MD;  Location:  [facility] OR;  Service: Robotics - DaVinci;  Laterality: Bilateral;    INSERTION HEMODIALYSIS CATHETER N/A 2025    Procedure: HEMODIALYSIS CATHETER PLACEMENT;  Surgeon: Gil Pineda DO;  Location:  [facility] HYBRID OR;  Service: Vascular;  Laterality: N/A;    MYRINGOTOMY W/ TUBES Left 2017    06/10/2016    TONSILLECTOMY      TOTAL HIP ARTHROPLASTY Right        Family History  Family History   Problem Relation Age of Onset    Breast cancer Mother     Dementia Father     Glaucoma Father     No Known Problems Daughter     Colon polyps Neg Hx     Colon cancer Neg Hx        Social History  Social History     Socioeconomic History    Marital status:    Tobacco Use    Smoking status: Former     Current packs/day: 0.00     Average packs/day: 0.5 packs/day for 25.8 years (12.9 ttl pk-yrs)     Types: Cigarettes     Start date:      Quit date: 10/13/2013     Years since quittin.7     Passive exposure: Past    Smokeless tobacco: Never    Tobacco comments:     quit    Vaping Use    Vaping status: Former    Substances: Nicotine    Devices: Pre-filled or refillable cartridge   Substance and Sexual Activity    Alcohol use: Not Currently    Drug use: No    Sexual activity: Not Currently     Partners: Female       Review of Systems:  History obtained from chart review and the patient  General ROS: No fever or chills  Respiratory ROS: No cough, shortness of breath, wheezing  Cardiovascular ROS: No chest pain or palpitations  Gastrointestinal ROS: No abdominal pain or melena  Genito-Urinary ROS: No dysuria or hematuria  Psych ROS: No anxiety and depression  14 point ROS reviewed with the patient and negative except as noted above and in the HPI unless unable to obtain.    Objective:  Patient Vitals for the past 24 hrs:   BP  "Temp Temp src Pulse Resp SpO2 Height Weight   07/08/25 0957 -- -- -- 73 16 100 % -- --   07/08/25 0951 -- -- -- 74 16 96 % -- --   07/08/25 0700 150/48 98.3 °F (36.8 °C) Oral 78 18 96 % -- --   07/08/25 0622 -- -- -- 75 16 100 % -- --   07/08/25 0616 -- -- -- 80 16 94 % -- --   07/08/25 0330 142/46 99.4 °F (37.4 °C) Oral 90 18 94 % -- 50.3 kg (111 lb)   07/07/25 2039 140/42 99.6 °F (37.6 °C) Oral 82 18 98 % -- --   07/07/25 1929 -- -- -- 80 18 98 % -- --   07/07/25 1922 -- -- -- 78 18 97 % -- --   07/07/25 1622 123/42 98.5 °F (36.9 °C) Oral 77 18 96 % -- --   07/07/25 1541 115/94 -- -- -- -- -- 182.9 cm (72\") 55.8 kg (123 lb)   07/07/25 1428 -- -- -- 79 16 96 % -- --   07/07/25 1300 115/94 97.7 °F (36.5 °C) Oral 77 16 94 % -- --       Intake/Output Summary (Last 24 hours) at 7/8/2025 1144  Last data filed at 7/7/2025 2039  Gross per 24 hour   Intake --   Output 400 ml   Net -400 ml     General: awake/alert   Chest:  clear to auscultation bilaterally without respiratory distress  CVS: regular rate and rhythm  Abdominal: soft, nontender, positive bowel sounds  Extremities: right AKA and no cyanosis or edema  Skin: warm and dry without rash      Labs:  Results from last 7 days   Lab Units 07/07/25  0410 07/06/25  0537 07/05/25  1756   WBC 10*3/mm3 16.96* 22.70* 20.93*   HEMOGLOBIN g/dL 8.6* 8.7* 7.7*   HEMATOCRIT % 27.0* 27.4* 23.4*   PLATELETS 10*3/mm3 157 159 170         Results from last 7 days   Lab Units 07/08/25  0506 07/07/25  0410 07/06/25  0537 07/05/25  1756   SODIUM mmol/L 135* 134* 132* 135*   POTASSIUM mmol/L 3.6 3.9 3.7 3.7   CHLORIDE mmol/L 97* 100 97* 99   CO2 mmol/L 26.0 22.0 25.0 24.0   BUN mg/dL 27.3* 42.9* 33.9* 29.6*   CREATININE mg/dL 2.32* 3.10* 2.47* 2.02*   CALCIUM mg/dL 8.9 9.0 8.5* 8.4*   EGFR mL/min/1.73 28.2* 19.9* 26.2* 33.3*   BILIRUBIN mg/dL  --  0.2 0.4 0.3   ALK PHOS U/L  --  268* 312* 322*   ALT (SGPT) U/L  --  29 48* 39   AST (SGOT) U/L  --  23 51* 46*   GLUCOSE mg/dL 115* 96 94 103* "       Radiology:   Imaging Results (Last 72 Hours)       Procedure Component Value Units Date/Time    XR Chest 1 View [[id number]] Collected: 07/05/25 1722     Updated: 07/05/25 1729    Narrative:      EXAMINATION: XR CHEST 1 VW- 7/5/2025 5:23 PM     HISTORY: Fever.     REPORT: Frontal view of the chest was obtained.     COMPARISON: Chest x-ray 6/19/2025.     There is interval improvement, near resolution of interstitial  infiltrates in the right lung, no lung consolidation or focal infiltrate  is identified. The left lung remains clear. The right internal jugular  central venous catheter appears in good position as before. Previous  median sternotomy and CABG is noted. Heart size appears normal, no  evidence of overt CHF is identified. The osseous structures are acutely  unremarkable. Mixed sclerotic/lytic changes in the right humeral head  appear similar, may be associated with osteonecrosis.       Impression:      Partial resolution of interstitial infiltrates in the right  lung, mild residual right-sided interstitial pneumonitis or mild  asymmetric pulmonary edema may be present. No lung consolidation or  acute focal infiltrate. Previous CABG. The right internal jugular  central venous catheter remains in good position.     This report was signed and finalized on 7/5/2025 5:26 PM by Dr. Ash Seay MD.               Culture:  Blood Culture   Date Value Ref Range Status   07/05/2025 No growth at 24 hours  Preliminary   07/05/2025 No growth at 24 hours  Preliminary     Urine Culture   Date Value Ref Range Status   07/05/2025 Culture in progress  Preliminary         Assessment    End stage renal disease on HD MWF  Hypertension  Recent right AKA  Sepsis   Anemia of CKD  Chronic diastolic CHF  Hyponatremia     Plan:   Dialysis next due 7/09  Monitor labs      Slava Tate, STER[place]ING  7/8/2025  11:44 CDT

## 2025-07-09 ENCOUNTER — APPOINTMENT (OUTPATIENT)
Dept: ULTRASOUND IMAGING | Facility: HOSPITAL | Age: 75
End: 2025-07-09
Payer: MEDICARE

## 2025-07-09 ENCOUNTER — TELEPHONE (OUTPATIENT)
Dept: VASCULAR SURGERY | Facility: CLINIC | Age: 75
End: 2025-07-09
Payer: MEDICARE

## 2025-07-09 LAB
ALBUMIN SERPL-MCNC: 2.6 G/DL (ref 3.5–5.2)
ALBUMIN/GLOB SERPL: 0.9 G/DL
ALP SERPL-CCNC: 287 U/L (ref 39–117)
ALT SERPL W P-5'-P-CCNC: 20 U/L (ref 1–41)
ANION GAP SERPL CALCULATED.3IONS-SCNC: 12 MMOL/L (ref 5–15)
AST SERPL-CCNC: 18 U/L (ref 1–40)
BACTERIA SPEC AEROBE CULT: ABNORMAL
BASOPHILS # BLD AUTO: 0.09 10*3/MM3 (ref 0–0.2)
BASOPHILS NFR BLD AUTO: 0.9 % (ref 0–1.5)
BILIRUB SERPL-MCNC: 0.2 MG/DL (ref 0–1.2)
BUN SERPL-MCNC: 43 MG/DL (ref 8–23)
BUN/CREAT SERPL: 14.9 (ref 7–25)
CALCIUM SPEC-SCNC: 9.1 MG/DL (ref 8.6–10.5)
CHLORIDE SERPL-SCNC: 98 MMOL/L (ref 98–107)
CO2 SERPL-SCNC: 25 MMOL/L (ref 22–29)
CREAT SERPL-MCNC: 2.88 MG/DL (ref 0.76–1.27)
DEPRECATED RDW RBC AUTO: 60.4 FL (ref 37–54)
EGFRCR SERPLBLD CKD-EPI 2021: 21.8 ML/MIN/1.73
EOSINOPHIL # BLD AUTO: 0.37 10*3/MM3 (ref 0–0.4)
EOSINOPHIL NFR BLD AUTO: 3.7 % (ref 0.3–6.2)
ERYTHROCYTE [DISTWIDTH] IN BLOOD BY AUTOMATED COUNT: 17 % (ref 12.3–15.4)
GLOBULIN UR ELPH-MCNC: 3 GM/DL
GLUCOSE SERPL-MCNC: 101 MG/DL (ref 65–99)
HCT VFR BLD AUTO: 25.5 % (ref 37.5–51)
HGB BLD-MCNC: 7.9 G/DL (ref 13–17.7)
IMM GRANULOCYTES # BLD AUTO: 0.18 10*3/MM3 (ref 0–0.05)
IMM GRANULOCYTES NFR BLD AUTO: 1.8 % (ref 0–0.5)
LYMPHOCYTES # BLD AUTO: 0.82 10*3/MM3 (ref 0.7–3.1)
LYMPHOCYTES NFR BLD AUTO: 8.2 % (ref 19.6–45.3)
MCH RBC QN AUTO: 30.7 PG (ref 26.6–33)
MCHC RBC AUTO-ENTMCNC: 31 G/DL (ref 31.5–35.7)
MCV RBC AUTO: 99.2 FL (ref 79–97)
MONOCYTES # BLD AUTO: 0.63 10*3/MM3 (ref 0.1–0.9)
MONOCYTES NFR BLD AUTO: 6.3 % (ref 5–12)
NEUTROPHILS NFR BLD AUTO: 7.89 10*3/MM3 (ref 1.7–7)
NEUTROPHILS NFR BLD AUTO: 79.1 % (ref 42.7–76)
NRBC BLD AUTO-RTO: 0 /100 WBC (ref 0–0.2)
PLATELET # BLD AUTO: 148 10*3/MM3 (ref 140–450)
PMV BLD AUTO: 9.8 FL (ref 6–12)
POTASSIUM SERPL-SCNC: 3.9 MMOL/L (ref 3.5–5.2)
PROT SERPL-MCNC: 5.6 G/DL (ref 6–8.5)
RBC # BLD AUTO: 2.57 10*6/MM3 (ref 4.14–5.8)
SODIUM SERPL-SCNC: 135 MMOL/L (ref 136–145)
WBC NRBC COR # BLD AUTO: 9.98 10*3/MM3 (ref 3.4–10.8)

## 2025-07-09 PROCEDURE — 94799 UNLISTED PULMONARY SVC/PX: CPT

## 2025-07-09 PROCEDURE — 80053 COMPREHEN METABOLIC PANEL: CPT | Performed by: HOSPITALIST

## 2025-07-09 PROCEDURE — 85025 COMPLETE CBC W/AUTO DIFF WBC: CPT | Performed by: HOSPITALIST

## 2025-07-09 PROCEDURE — 25010000002 HEPARIN (PORCINE) PER 1000 UNITS: Performed by: INTERNAL MEDICINE

## 2025-07-09 PROCEDURE — 25010000002 EPOETIN ALFA-EPBX 10000 UNIT/ML SOLUTION: Performed by: INTERNAL MEDICINE

## 2025-07-09 PROCEDURE — 25010000002 CEFEPIME PER 500 MG: Performed by: FAMILY MEDICINE

## 2025-07-09 RX ORDER — BUDESONIDE AND FORMOTEROL FUMARATE DIHYDRATE 160; 4.5 UG/1; UG/1
2 AEROSOL RESPIRATORY (INHALATION)
Status: DISCONTINUED | OUTPATIENT
Start: 2025-07-09 | End: 2025-07-11 | Stop reason: HOSPADM

## 2025-07-09 RX ORDER — ALPRAZOLAM 0.5 MG
0.5 TABLET ORAL NIGHTLY PRN
COMMUNITY
End: 2025-07-11 | Stop reason: HOSPADM

## 2025-07-09 RX ORDER — ALPRAZOLAM 0.25 MG
0.25 TABLET ORAL ONCE AS NEEDED
Status: COMPLETED | OUTPATIENT
Start: 2025-07-09 | End: 2025-07-09

## 2025-07-09 RX ADMIN — TAMSULOSIN HYDROCHLORIDE 0.4 MG: 0.4 CAPSULE ORAL at 21:09

## 2025-07-09 RX ADMIN — EPOETIN ALFA-EPBX 10000 UNITS: 10000 INJECTION, SOLUTION INTRAVENOUS; SUBCUTANEOUS at 12:24

## 2025-07-09 RX ADMIN — Medication 10 ML: at 21:10

## 2025-07-09 RX ADMIN — CARVEDILOL 6.25 MG: 6.25 TABLET, FILM COATED ORAL at 12:19

## 2025-07-09 RX ADMIN — BUDESONIDE AND FORMOTEROL FUMARATE DIHYDRATE 2 PUFF: 160; 4.5 AEROSOL RESPIRATORY (INHALATION) at 05:48

## 2025-07-09 RX ADMIN — ASPIRIN 81 MG: 81 TABLET, COATED ORAL at 12:18

## 2025-07-09 RX ADMIN — ISOSORBIDE DINITRATE 10 MG: 10 TABLET ORAL at 12:19

## 2025-07-09 RX ADMIN — MONTELUKAST 10 MG: 10 TABLET, FILM COATED ORAL at 21:09

## 2025-07-09 RX ADMIN — PANTOPRAZOLE SODIUM 40 MG: 40 TABLET, DELAYED RELEASE ORAL at 06:17

## 2025-07-09 RX ADMIN — ISOSORBIDE DINITRATE 10 MG: 10 TABLET ORAL at 17:35

## 2025-07-09 RX ADMIN — Medication 150 MG: at 12:18

## 2025-07-09 RX ADMIN — HEPARIN SODIUM 5000 UNITS: 5000 INJECTION INTRAVENOUS; SUBCUTANEOUS at 21:09

## 2025-07-09 RX ADMIN — ACETAMINOPHEN 650 MG: 325 TABLET ORAL at 09:45

## 2025-07-09 RX ADMIN — HYDRALAZINE HYDROCHLORIDE 50 MG: 50 TABLET ORAL at 12:19

## 2025-07-09 RX ADMIN — EMPAGLIFLOZIN 10 MG: 10 TABLET, FILM COATED ORAL at 12:19

## 2025-07-09 RX ADMIN — Medication 10 ML: at 12:43

## 2025-07-09 RX ADMIN — DOCUSATE SODIUM 50 MG AND SENNOSIDES 8.6 MG 2 TABLET: 8.6; 5 TABLET, FILM COATED ORAL at 12:18

## 2025-07-09 RX ADMIN — ALPRAZOLAM 0.25 MG: 0.25 TABLET ORAL at 22:19

## 2025-07-09 RX ADMIN — LEVOTHYROXINE SODIUM 100 MCG: 0.1 TABLET ORAL at 06:17

## 2025-07-09 RX ADMIN — OXYCODONE HYDROCHLORIDE AND ACETAMINOPHEN 1 TABLET: 5; 325 TABLET ORAL at 21:09

## 2025-07-09 RX ADMIN — HEPARIN SODIUM 5000 UNITS: 5000 INJECTION INTRAVENOUS; SUBCUTANEOUS at 12:24

## 2025-07-09 RX ADMIN — BUDESONIDE AND FORMOTEROL FUMARATE DIHYDRATE 2 PUFF: 160; 4.5 AEROSOL RESPIRATORY (INHALATION) at 20:13

## 2025-07-09 RX ADMIN — IPRATROPIUM BROMIDE 0.5 MG: 0.5 SOLUTION RESPIRATORY (INHALATION) at 05:48

## 2025-07-09 RX ADMIN — ATORVASTATIN CALCIUM 10 MG: 10 TABLET, FILM COATED ORAL at 12:19

## 2025-07-09 RX ADMIN — IPRATROPIUM BROMIDE 0.5 MG: 0.5 SOLUTION RESPIRATORY (INHALATION) at 20:10

## 2025-07-09 RX ADMIN — OXYCODONE HYDROCHLORIDE AND ACETAMINOPHEN 1 TABLET: 5; 325 TABLET ORAL at 06:17

## 2025-07-09 RX ADMIN — VALSARTAN 320 MG: 80 TABLET, FILM COATED ORAL at 12:19

## 2025-07-09 RX ADMIN — CLOPIDOGREL BISULFATE 75 MG: 75 TABLET, FILM COATED ORAL at 12:19

## 2025-07-09 RX ADMIN — CARVEDILOL 6.25 MG: 6.25 TABLET, FILM COATED ORAL at 17:35

## 2025-07-09 RX ADMIN — NIFEDIPINE 120 MG: 60 TABLET, FILM COATED, EXTENDED RELEASE ORAL at 12:18

## 2025-07-09 RX ADMIN — CEFEPIME 2000 MG: 2 INJECTION, POWDER, FOR SOLUTION INTRAVENOUS at 12:29

## 2025-07-09 RX ADMIN — GABAPENTIN 300 MG: 300 CAPSULE ORAL at 21:09

## 2025-07-09 RX ADMIN — CALCITRIOL CAPSULES 0.25 MCG 0.5 MCG: 0.25 CAPSULE ORAL at 12:18

## 2025-07-09 NOTE — PROGRESS NOTES
[facility] Clinical Pharmacy Services: Cefepime Consult  Ricardo Hugo is a 75 y.o. male who has been consulted to dose Cefepime for Sepsis.    Current Antimicrobial Therapy:  cefepime 2000 mg IVPB in 100 mL NS (MBP)    Relevant clinical data and objective history reviewed:  Wt: 53.8 kg (118 lb 11.2 oz); BMI: Body mass index is 16.1 kg/m².    Temp Readings from Last 1 Encounters:   07/09/25 97.9 °F (36.6 °C) (Oral)        Estimated Creatinine Clearance: 16.3 mL/min (A) (by C-G formula based on SCr of 2.88 mg/dL (H)).    Results from last 7 days   Lab Units 07/09/25  0326 07/08/25  0506 07/07/25  0410 07/06/25  0537   CREATININE mg/dL 2.88* 2.32* 3.10* 2.47*   WBC 10*3/mm3 9.98  --  16.96* 22.70*       Microbiology Culture Results:  Culture results from last 30 days   Lab 07/05/25  1841 07/05/25  1731   BLOODCX  --  No growth at 3 days  No growth at 3 days   URINECX >100,000 CFU/mL Pseudomonas aeruginosa MDRO*  --        Assessment/Plan:  Initiate Cefepime 2000 mg IV extended infusion every 24 hours (give after dialysis on HD days)  Pharmacy will continue to monitor renal function, clinical status and culture results and adjust the dose and/or frequency as needed.    NIK Bell [facility]  7/9/2025  08:27 CDT

## 2025-07-09 NOTE — PROGRESS NOTES
2 L removed. No issues during dialysis.      [facility] [place] [facility]  DIALYSIS TREATMENT SUMMARY     Note: Consult with the attending physician for patient treatment orders, this document is not a physician order.     Patient Information  Patient Ricardo Hugo  Date of Birth February 22, 1950  Chart Number [id number]  Location [facility]  Location MRN [id number]  Gender Male  SSN (last 4)   Treatment Information  Treatment Type Hemodialysis  Treatment Id [id number]  Start Time July 09, 2025 08:05  End Time July 09, 2025 11:38  Acutal Duration 03:33  Treatment Balances  Total Saline Administered 500  Net Fluid Balance -2000  Hemodialysis Orders  Therapy Standard  Orders Verified Time 07/09/2025 07:43   Date Verified 07/09/2025  Duration 3:30  Isolated UF/Bypass No  BFR (mL) 450  DFR (mL) 700  Dialyzer Type OPTIFLUX 160NR  UF Order UF Range  UF Range (mL) 1500 - 2500  With BP Parameters Yes  Crit-Line used No  Heparin Initial Units Bolus No  Heparin IV Maintenance Bolus No  Heparin IV Infusion No  Potassium (mEq/L) 3  Calcium (mEq/L) 2.5  Bicarb (mg/dL) 35  Sodium (mEq/L) 137  Additional Orders to keep sbp greater than 90  Clinician Kristan Beck RN  AV Access  Access Type Central Venous Catheter  Central Venous Catheter  Access Type Catheter - Tunneled  Access Location Chest Wall - R  Current/New [facility] Patient Yes  1st Use Catheter Verified by Previous Use  Catheter Care Completed per Policy Yes  Dressing Dry and Intact on Arrival Yes  Dressing Changed Yes  Type of Dressing Film Biopatch/CHG  Dressing Changed By [facility] Staff  Type of HD Caps Curos  HD Caps Changed Yes  CVC Line Education Provided Yes - Infection Prevention  Vitals  Pre-Treatment Vitals  Time Is BP being recorded? Pre BP Map BP Method Pulse RR Temp How was Weight Obtained? Pre Weight Previous Dry Weight Previous Post Weight Metric Target Fluid Removal (mL) Dialysate Confirmed Clinician  07/09/2025  07:59 BP/Map 135/75 (95) Noninvasive 70 16 97.7 How Obtained: Reported Floor Weight 53.8 59.6  Kgs 2500 Yes Clare Fabian  Comments: pre-vitals  Intra Procedure Vitals  Rec Type Time Is BP being recorded? BP Map Pulse RR BFR DFR AP  TMP UFR RMVD Bolus NSS Flush Chills Safety Check Crit-Line HCT Crit-Line BV% Clinician  E 07/09/2025 11:38 BP/Map 137/71 (93) 80        2500    Yes   Clare Fabian  Comments: HD complete.  Post Treatment Vitals  Time Is BP being recorded? BP Map Pulse RR Temp How was Weight Obtained? Post Weight Metric BVP UF Goal Ordered NSS Given Intra-Procedure Total Machine UF Removed (mL) Crit-Line Ending Profile Crit-Line Refill Crit-Line Ending HCT Crit-Line Max BV% Clinician  07/09/2025 11:53 BP/Map 170/71 (104) 76 16 97.7 How Obtained: Reported Floor Weight 51.8 Kgs 86.8 2000 0 2500     Clare Fabian  Comments: Pt tolerated treatment well, returned to floor via transport in stable condition.  Safety checks include: access uncovered and secured, Hemaclip secured for all central line access, machine checks performed, and alarm limits confirmed.  Labs  Hepatitis  HBsAG Lab Result HBsAG Lab Result HBsAG Draw Date Transient Antigenemia(MD Diagnosis Only) Anti-HBs Lab Result Anti-HBs Lab Value Anti-HBs Draw Date Documented By Documented Date Hepatitis Status Hepatitis Status  Negative  07/06/2025  Negative  07/06/2025 Clare Fabian 07/09/2025  Susceptible  Notes:   Pre-Treatment Hepatitis Precautions Copy of hepatitis results verified in hospital EMR Yes Signed By Kristan Beck RN  Patient tx with immune pts only Yes Hepatitis Information Entered By Kristan Beck RN   Patient tx outside buffer zone Yes Signing   Pre-Treatment Handoff  Staff Report Received Yes  Report Given by Primary Nurse Anita Josue  Time 07:30  Patient Arrival  Patient ID Verified Date of Birth  Full Name  Patient Consent to treatment verified Yes  Blood Transfusion Consent Verified N/A  Treatment Comments  Treatment Notes Pt  tolerated treatment well, returned to floor via transport in stable condition. 2 L removed. CVC dressing changed.  Post-Treatment Handoff  Report Given to Primary Nurse Perla Josue  Time Report Given 11:56  Report Given By Kristan Beck RN  Machine Validation  Time 07:50  Date 7/9/2025  Auto Alarm Test Passed Yes  Machine Serial # [id number]  Portable RO Yes  RO Serial# 19  Residual Bleach Negative Yes  Was a manufactured mix used? Yes  Machine Log Completed Yes  Total Chlorine (less than 0.1)? Yes  Total Chlorine Log Completed Yes  Bicarb BiBag  Bibag Size 900  Machine Temp 35.6  Machine Conductivity 13.8  Meter Type N/A  Meter Conductivity   Independent Conductivity 13.7  pH Status Pass Pass  pH   TCD Value 13.7  TCD Alarm with +/- 0.5 Yes  NVL enabled validated 100 asymmetric Yes  Safety check complete Clare Fabian  Second Verification Performed? Yes  Second Verification Performed By Kristan Beck RN  Reason Not Verified   Serum Lab Values  Time BUN Creatinine Na K (mEq/L) Cl CO2 Ca (mEq/L) Phos Mg (mg/dL) Alb (g/dL) Glucose Hgb Hct WBC Plt PT aPTT INR Other Clinician  07/09/2025 03:26 43 2.88 135 3.9 98 25 9.1    101 7.9 25.5 9.98      Clare Fabian  Comments:   Facility Information Stat Treatment No If yes, Explain Other  Facility Information Patient Type Chronic dialysis patient with diagnosis of ESRD Isolation Type Contact  Admission Date 07/05/2025 Patient Chronic Unit [facility] Other pseudomonas  Ordering MD Quiroz Patient Home Unit [facility] Completed by  Account/Finance Number [id number] Code Status Full Code General Tx information Entered by Kristan Beck RN  Admission Status InPatient Diagnosis Facility Information  Location Dialysis Suite Multi Diagnosis sepsis Notes pt is a & o x 3. hemodynamically stable.  Room # 443 Isolation Information   Bed # 1 Isolation Required? Y   Start Treatment Time Out Confirmed by ANNA Beck RN, [facility] Correct access site verified Yes  Treatment  Initiation Connections Secured Time Out Completed 07:57 Treatment Start Date 07/09/2025  Saline line double clamped Correct patient verified Yes Treatment Start Time 08:05  Hemaclip Applied Correct procedure verified Yes Patient/Family questions and concerns addressed Yes  Pre Focused Assessment Notes room air GI Bowel sounds present  Access Edema  Soft  Signs and Symptoms of Infection? No Location None  Non distended  Pain Screening Cardiac  Nontender  Does the patient have pain? No Heart Rhythm Regular   Respiratory Telemetry Yes  Voids  Lung Sounds Diminished Skin Completed by  Location Bases Skin Warm Pre Treatment Focused Assessment Completed By Kristan Beck RN  Bilateral  Dry Time 07:58  Position Anterior LOC Signing  Bases LOC Alert and Oriented x3 Signed By Kristan Beck RN  Respiratory Efforts Unlabored GI / Bowels   Education  Infection Prevention Method Knowledge / Understanding Assessed Teach back  Patient Education Method Knowledge / Understanding Assessed Teach back Patient Education Reinforced By  Patient Educated? Yes Family Education Provided? N/A Patient Education Reinforced by Kristan Beck RN  Focus or Topic Treatment Options Caregiver Education Provided? Yes   Access Maintenance Focus or Topic Hemodialysis treatment review   Post-Treatment HD machine external disinfection completed per policy Yes Completed by  Post Treatment Delay PRO external disinfection completed per policy Yes Post Treatment Form Completed By Kristan Beck RN  Delay N Post Treatment General Information   Machine Disinfection Requirement Notes Pt tolerated treatment well, returned to floor via transport in stable condition. 2 L removed. CVC dressing changed.   Post Focused Assessment Position Bases GI / Bowels  Changes from Pre Focused Assessment  Anterior GI Soft  Changes from Pre Treatment Focused Assessment? No Respiratory Efforts Unlabored  Bowel sounds present  Access Notes room air  Non distended  Cath Packed  with NS Edema  Nontender  Catheter clamped and capped Yes Location None   Access Flow Good Cardiac  Voids  Dialyzer Clearance Streaked Heart Rhythm Regular Completed by  Pain Screening Telemetry Yes Post Treatment Focused Assessment Completed by Kristan Beck RN  Does the patient have pain? No Skin Date 07/09/2025  Respiratory Skin Warm Time 11:54  Lung Sounds Diminished  Dry Signing  Location Bilateral LOC Signed By Kristan Beck RN  Bases LOC Alert and Oriented x3      Normal rate, regular rhythm, normal S1, S2 heart sounds heard.

## 2025-07-09 NOTE — PROGRESS NOTES
[facility] Medicine Services  INPATIENT PROGRESS NOTE    Patient Name: Ricardo Hugo  Date of Admission: 7/5/2025  Today's Date: 07/09/25  Length of Stay: 4  Primary Care Physician: Nathan Zimmer MD    Subjective   Chief Complaint: Confusion, fevers  HPI   75-year-old male with history of coronary artery disease status post CABG, chronic diastolic cardiomyopathy, end-stage renal disease on hemodialysis Monday Wednesday and Friday, anemia of prior kidney disease, hypertension, right above-the-knee amputation June 24, 2025, chronic obstructive pulmonary disease, hyperlipidemia, chronic lymphocytic leukemia, admitted to the hospital July 5, 2025 as per report, the patient was found confused and febrile in the dialysis center.  He was diagnosed with a urinary tract infection and started on antibiotic treatment.  He has received ceftriaxone, today is day #4.    I started taking care of this patient on 7/9/2025.  Reports no fevers. I evaluated patient during hemodialysis.  Reports no abdominal pain.  He is found extremely weak, following commands and oriented.        Review of Systems   All pertinent negatives and positives are as above. All other systems have been reviewed and are negative unless otherwise stated.     Objective    Temp:  [97.7 °F (36.5 °C)-99 °F (37.2 °C)] 97.7 °F (36.5 °C)  Heart Rate:  [69-78] 76  Resp:  [16-18] 16  BP: (128-154)/(49-60) 154/60  Physical Exam  Constitutional:       Appearance: Alert and oriented x 3, no respiratory distress; chronically ill-appearing.  Malnourished.  HENT:      Head: Normocephalic and atraumatic.      Nose: Nose normal.      Mouth/Throat:      Mouth: Mucous membranes are moist.      Pharynx: Oropharynx is clear.   Eyes:      Extraocular Movements: Extraocular movements intact.      Conjunctiva/sclera: Conjunctivae normal.      Pupils: Pupils are equal, round  Cardiovascular:      Rate and Rhythm: Normal rate and regular  rhythm.      Pulses: Normal pulses.   Pulmonary:      Effort: No respiratory distress.      Breath sounds: Normal breath sounds.  Abdominal:      General: Abdomen is flat. Bowel sounds are normal.      Palpations: Abdomen is soft.   Extremities:  No lower extremity edema.  Skin:     Capillary Refill: Capillary refill takes less than 2 seconds.      Coloration: Skin is not jaundiced.   Neurological:      General: No focal deficit present.      Mental Status: Patient is alert, oriented to place time and person.          Results Review:  I have reviewed the labs, radiology results, and diagnostic studies.    Laboratory Data:   Results from last 7 days   Lab Units 07/09/25  0326 07/07/25  0410 07/06/25  0537   WBC 10*3/mm3 9.98 16.96* 22.70*   HEMOGLOBIN g/dL 7.9* 8.6* 8.7*   HEMATOCRIT % 25.5* 27.0* 27.4*   PLATELETS 10*3/mm3 148 157 159        Results from last 7 days   Lab Units 07/09/25  0326 07/08/25  0506 07/07/25 0410 07/06/25  0537   SODIUM mmol/L 135* 135* 134* 132*   POTASSIUM mmol/L 3.9 3.6 3.9 3.7   CHLORIDE mmol/L 98 97* 100 97*   CO2 mmol/L 25.0 26.0 22.0 25.0   BUN mg/dL 43.0* 27.3* 42.9* 33.9*   CREATININE mg/dL 2.88* 2.32* 3.10* 2.47*   CALCIUM mg/dL 9.1 8.9 9.0 8.5*   BILIRUBIN mg/dL 0.2  --  0.2 0.4   ALK PHOS U/L 287*  --  268* 312*   ALT (SGPT) U/L 20  --  29 48*   AST (SGOT) U/L 18  --  23 51*   GLUCOSE mg/dL 101* 115* 96 94       Culture Data:   Blood Culture   Date Value Ref Range Status   07/05/2025 No growth at 3 days  Preliminary   07/05/2025 No growth at 3 days  Preliminary     Urine Culture   Date Value Ref Range Status   07/05/2025 >100,000 CFU/mL Pseudomonas aeruginosa MDRO (A)  Preliminary     Comment:     Pip/sonja and ceftazidime results to follow.  Multi drug resistant Pseudomonas, patient may be an isolation risk.       Radiology Data:   Imaging Results (Last 24 Hours)       ** No results found for the last 24 hours. **            I have reviewed the patient's current medications.      Assessment/Plan   Assessment  Active Hospital Problems    Diagnosis     **Sepsis     Elevated troponin     Coronary artery disease involving native coronary artery of native heart without angina pectoris     ESRD (end stage renal disease) on dialysis     Chronic diastolic (congestive) heart failure     Anemia due to chronic kidney disease     Peripheral arterial disease     IHD (ischemic heart disease)     Essential hypertension      Severe sepsis  Complicated urinary tract infection Pseudomonas MDRO  Non-ST elevation myocardial infarction type 2  End-stage renal disease on hemodialysis  Severe protein calorie malnutrition  Coronary artery disease status post CABG  Peripheral vascular disease  Hypertension  Chronic diastolic cardiomyopathy  Anemia of chronic kidney disease        On admission, temperature was above 38 Celsius, leukocytosis above 20,000, lactic acid was 2.0.    Blood cultures negative after 3 days    Urine culture with Pseudomonas aeruginosa MDRO, sensitive to cefepime, amikacin and ceftazidime.      Sodium 135 potassium 3.9, BUN 43 creatinine 2.88.  Hemoglobin 7.9, hematocrit 25.5.    White blood cell count down from 22,000 to 9,900.      Treatment Plan  Day #4 of ceftriaxone.  Will change antibiotic to cefepime given urine culture results, adjusted for renal function.  Anticipate total treatment of 10 days.    Continue aspirin, atorvastatin, Plavix, Jardiance.    Blood pressure has been low on last reading, likely due to HD.  Monitor blood pressure closely.    Decrease nifedipine XL from 120 to 90 mg p.o. daily.  Hold hydralazine p.o. for now; restart if BP increases again.  Continue carvedilol current dose.  Continue valsartan 320 mg p.o. daily.    Continue Flomax  Continue Protonix.    Hemodialysis per Nephrology, M-W-F.    Nutritional support.    Heparin for DVT prophylaxis.      Anticipate discharge to prior setting.            Medical Decision Making  Number and Complexity of problems: 10,  high complexity    Differential diagnosis: see above    Conditions and Status        Condition is unchanged.     [facility] Data  External documents reviewed: no  Cardiac tracing (EKG, telemetry) interpretation: no new  Radiology interpretation: no new  Labs reviewed: yes  Any tests that were considered but not ordered: no     Decision rules/scores evaluated (example [id number], [place], etc): none     Discussed with: patient     Care Planning  Shared decision making: patient  Code status and discussions: FC    Disposition  Social Determinants of Health that impact treatment or disposition: none  I expect the patient to be discharged to SNF        Electronically signed by Ramon Aguilera MD, 07/09/25, 10:23 CDT.

## 2025-07-09 NOTE — PROGRESS NOTES
Assessment tool to be used for patients with existing breathing treatments ordered by hospitalist                               Respiratory Therapist Driven Protocol - RT to Assess and Treat Algorithm    Item 0 Points 1 Point 2 Points 3 Points 4 Points Subtotal   Mental Status Alert, orientated, cooperative Lethargic, follows commands Confused, not following commands Obtunded or Somnolent Comatose 1   Respiratory Pattern Regular RR  8-16 breaths/minute Increased RR  18-25 breaths/minute Dyspnea on exertion, irregular RR  26-30/minute Shortness of breath,  RR 31-35 breaths/minute Accessory muscle use, severe SOB  RR > 35 breath/minute 0   Breath Sounds Clear Decreased unilaterally Decreased bilaterally Basilar crackles Wheezing and/or rhonchi 1   Cough Strong, spontaneous non-productive Strong productive Weak, non-productive Weak productive or weak with rhonchi Absent or may require suctioning 2   Pulmonary Status Nonsmoker or no previous history > 1 year quit < 1 PPD  < 1 year quit >  or = 1 PPD Diagnosed pulmonary disease (severe or chronic) Severe or chronic pulmonary disease with exacerbation 0   Surgical Status None General surgery (non-abdominal or non-thoracic) Lower abdominal Thoracic or upper abdominal Thoracic with pulmonary disease 0   Chest X-ray Clear Chronic changes Infiltrates, atelectasis or pleural effusion Infiltrates > 1 lobe Diffuse infiltrates and atelectasis and/or effusions 2   Activity level Ambulatory Ambulatory with assistance Non-ambulatory Paraplegic Quadriplegic 1                     Total Score 7   Score    Drug Therapy Frequency  20 or >    Q4 Duoneb & Q3 Albuterol PRN 15 - 19     Q6 Duoneb & Q4 Albuterol PRN 10 - 14    QID Duoneb & Q4 Albuterol PRN 5 - 9    TID Duoneb & Q6 Albuterol PRN 0 - 4    Q4 PRN Duoneb or Q4 PRN Albuterol    Incentive Spirometry - Initial RT instruct    Lung Expansion Therapy (PEP) Bronchopulmonary Hygiene (CPT)   Q4 & PRN - Severe atelectasis,  poor oxygenation Q4 - copious secretions, dyspnea, unable to sleep, mucus plugging   QID - High risk for persistent atelectasis, existence of atelectasis QID & Q4 PRN - Moderate secretion production   TID - At risk for developing atelectasis TID - small amounts of secretions with poor cough   BID - prevention of atelectasis BID - unable to breathe deeply and cough spontaneously   *RT Protocol patients will be re-assessed/re-evaluated every 48 hours.    *Patients who are home nebulizer treatments will be protocoled to no less than their home regimen and will remain     on their home regimen with re-evaluations as needed with changes in patient condition.    RT Comments/Recommendations: tid and prn

## 2025-07-09 NOTE — PLAN OF CARE
Goal Outcome Evaluation:              Patient is alert and oriented x 4, on room air. Dialysis today reports from Nurse Kristan, 2L of fluid removed as well as dressing changes. Patient complains of stomach pain and reports need to have bowel movement, although has had two medium bowel movement this afternoon. Family member in room reports concerns about patient having breathing issues when sleeping, MD notified. Call light within reach, safety maintained.

## 2025-07-09 NOTE — PROGRESS NOTES
Nephrology ([facility]) Progress Note      Patient:  Ricardo Hugo  YOB: 1950  Date of Service: 7/9/2025  MRN: [id number]   Acct: [id number]   Primary Care Physician: Nathan Zimmer MD  Advance Directive:   Code Status and Medical Interventions: CPR (Attempt to Resuscitate); Full Support   Ordered at: 07/05/25 2124     Code Status (Patient has no pulse and is not breathing):    CPR (Attempt to Resuscitate)     Medical Interventions (Patient has pulse or is breathing):    Full Support     Level Of Support Discussed With:    Patient     Admit Date: 7/5/2025       Hospital Day: 4  Referring Provider: No Known Provider      Patient personally seen and examined.  Complete chart including Consults, Notes, Operative Reports, Labs, Cardiology, and Radiology studies reviewed as able.        Subjective:  Ricardo Hugo is a 75 y.o. male for whom we were consulted for evaluation and treatment of end stage renal disease on hemodialysis Monday Wednesday Friday. History of diastolic CHF, CAD with prior CABG, hypertension. Recent admission to [facility] and had right AKA. Discharged to rehab facility on 7/03.   Returned to ER on 7/05 due to findings of confusion and fever of 105 while at outpatient dialysis clinic. Denied cough, abdominal pain, dysuria. Some confusion on initial nephrology exam. Tolerated dialysis well on 7/07    Today is awake and alert. No new complaint or overnight issues. Seen during HD and tolerating well  Dialysis   Patient was seen and evaluated on renal replacement therapy and I have personally evaluated the patient and directed the therapy  Modality: Hemodialysis  Access: Catheter  Location: right upper  QB: 450  QD: 700  UF: 2000    Allergies:  Ondansetron, Zofran [ondansetron hcl], Lortab [hydrocodone-acetaminophen], and Allopurinol    Home Meds:  Medications Prior to Admission   Medication Sig Dispense Refill Last Dose/Taking    aspirin 81 MG chewable  tablet Chew 1 tablet Daily.   Taking    atorvastatin (LIPITOR) 10 MG tablet Take 1 tablet by mouth Daily. 90 tablet 3 Taking    Budeson-Glycopyrrol-Formoterol (Breztri Aerosphere) 160-9-4.8 MCG/ACT aerosol inhaler Inhale 2 puffs 2 (Two) Times a Day.   Taking    calcitriol (ROCALTROL) 0.5 MCG capsule Take 1 capsule by mouth Daily. 90 capsule 2 Taking    carvedilol (COREG) 12.5 MG tablet Take 1 tablet by mouth 2 (Two) Times a Day With Meals.   Taking    clopidogrel (PLAVIX) 75 MG tablet Take 1 tablet by mouth Daily.   Taking    empagliflozin (Jardiance) 10 MG tablet tablet Take 1 tablet by mouth Daily.   Taking    esomeprazole (nexIUM) 20 MG capsule Take 1 capsule by mouth Every Morning Before Breakfast.   Taking    gabapentin (NEURONTIN) 300 MG capsule Take 1 capsule by mouth Every Night for 24 doses.   Taking    hydrALAZINE (APRESOLINE) 100 MG tablet Take 0.5 tablets by mouth 3 (Three) Times a Day.   Taking    iron polysaccharides (NIFEREX) 150 MG capsule Take 1 capsule by mouth Daily.   Taking    isosorbide dinitrate (ISORDIL) 10 MG tablet Take 1 tablet by mouth 3 (Three) Times a Day. 270 tablet 2 Taking    levothyroxine (Synthroid) 100 MCG tablet Take 1 tablet by mouth Every Morning. 90 tablet 2 Taking    magnesium chloride-calcium (SLOW MAGNESIUM/CALCIUM)  MG tablet delayed-release ec tablet Take 1 tablet by mouth Daily.   Taking    montelukast (SINGULAIR) 10 MG tablet Take 1 tablet by mouth Every Night.   Taking    Multiple Vitamins-Minerals (PRESERVISION/LUTEIN) capsule Take 1 capsule by mouth 2 (two) times a day.   Taking    NIFEdipine XL (ADALAT CC) 60 MG 24 hr tablet Take 2 tablets by mouth Daily.   Taking    oxyCODONE-acetaminophen (PERCOCET) 5-325 MG per tablet Take 1 tablet by mouth Every 6 (Six) Hours As Needed for Moderate Pain for up to 24 doses. 24 tablet 0 7/5/2025    polyethylene glycol (MIRALAX) 17 g packet Take 17 g by mouth Daily. For constipation   Taking    tamsulosin (FLOMAX) 0.4 MG  capsule 24 hr capsule Take 1 capsule by mouth Every Night.   Taking    valsartan (DIOVAN) 320 MG tablet Take 1 tablet by mouth Daily.   Taking    vitamin D (ERGOCALCIFEROL) 1.25 MG (17857 UT) capsule capsule Take 1 capsule by mouth 1 (One) Time Per Week. Mondays   Taking    acetaminophen (TYLENOL) 325 MG tablet Take 2 tablets by mouth Every 4 (Four) Hours As Needed for Mild Pain.   Unknown    albuterol sulfate  (90 Base) MCG/ACT inhaler Inhale 2 puffs Every 6 (Six) Hours As Needed for Wheezing or Shortness of Air.   Unknown    bisacodyl (DULCOLAX) 10 MG suppository Insert 1 suppository into the rectum Daily As Needed for Constipation.   Unknown    desoximetasone (TOPICORT) 0.25 % cream Apply 1 Application topically to the appropriate area as directed 2 (Two) Times a Day As Needed for Irritation. irritation 60 g 0     docusate sodium (COLACE) 100 MG capsule Take 1 capsule by mouth 3 (Three) Times a Day As Needed for Constipation.   Unknown    fluticasone (FLONASE) 50 MCG/ACT nasal spray Administer 2 sprays into the nostril(s) as directed by provider Daily As Needed for Allergies.   Unknown    naloxone (NARCAN) 4 MG/0.1ML nasal spray Call 911. Don't prime. [place] in 1 nostril for overdose. Repeat in 2-3 minutes in other nostril if no or minimal breathing/responsiveness. 2 each 0 Unknown    senna 8.6 MG tablet Take 2 tablets by mouth Daily As Needed for Constipation.   Unknown       Medicines:  Current Facility-Administered Medications   Medication Dose Route Frequency Provider Last Rate Last Admin    acetaminophen (TYLENOL) tablet 650 mg  650 mg Oral Q4H PRN Dc Issa DO        albuterol (ACCUNEB) nebulizer solution 0.63 mg  0.63 mg Nebulization Q6H PRN Vidal Miramontes MD        aspirin EC tablet 81 mg  81 mg Oral Daily Dc sIsa DO   81 mg at 07/08/25 0822    atorvastatin (LIPITOR) tablet 10 mg  10 mg Oral Daily Dc Issa DO   10 mg at 07/08/25 0823    sennosides-docusate (PERICOLACE)  8.6-50 MG per tablet 2 tablet  2 tablet Oral BID Dc Issa DO   2 tablet at 07/08/25 0822    And    polyethylene glycol (MIRALAX) packet 17 g  17 g Oral Daily PRN Dc Isas DO        And    bisacodyl (DULCOLAX) EC tablet 5 mg  5 mg Oral Daily PRN Dc Issa DO        And    bisacodyl (DULCOLAX) suppository 10 mg  10 mg Rectal Daily PRN Dc Issa DO        budesonide-formoterol (SYMBICORT) 160-4.5 MCG/ACT inhaler 2 puff  2 puff Inhalation BID - RT Ramon Aguilera MD        And    ipratropium (ATROVENT) nebulizer solution 0.5 mg  0.5 mg Nebulization TID - RT Ramon Aguilera MD        calcitriol (ROCALTROL) capsule 0.5 mcg  0.5 mcg Oral Daily Dc Issa DO   0.5 mcg at 07/08/25 0823    carvedilol (COREG) tablet 6.25 mg  6.25 mg Oral BID With Meals Dc Issa DO   6.25 mg at 07/08/25 1802    cefepime 2000 mg IVPB in 100 mL NS (MBP)  2,000 mg Intravenous Once Ramon Aguilera MD        [START ON 7/10/2025] cefepime 2000 mg IVPB in 100 mL NS (MBP)  2,000 mg Intravenous Q24H Ramon Aguilera MD        clopidogrel (PLAVIX) tablet 75 mg  75 mg Oral Daily Dc Issa DO   75 mg at 07/08/25 0823    empagliflozin (JARDIANCE) tablet 10 mg  10 mg Oral Daily Vidal Miramontes MD   10 mg at 07/08/25 0823    epoetin cecile-epbx (RETACRIT) injection 10,000 Units  10,000 Units Subcutaneous Once per day on Monday Wednesday Friday Giuliano Saldana MD   10,000 Units at 07/07/25 1432    fluticasone (FLONASE) 50 MCG/ACT nasal spray 2 spray  2 spray Nasal Daily PRN Vidal Miramontes MD        gabapentin (NEURONTIN) capsule 300 mg  300 mg Oral Nightly Dc Issa DO   300 mg at 07/08/25 2019    heparin (porcine) 5000 UNIT/ML injection 5,000 Units  5,000 Units Subcutaneous Q12H Dc Issa DO   5,000 Units at 07/08/25 2019    heparin (porcine) injection 3,200 Units  3,200 Units Intracatheter PRN Twan Quiroz MD   3,200 Units at 07/07/25 1201    hydrALAZINE  (APRESOLINE) tablet 50 mg  50 mg Oral TID Vidal Miramontes MD   50 mg at 07/08/25 2019    iron polysaccharides (NIFEREX) capsule 150 mg  150 mg Oral Daily Dc Issa DO   150 mg at 07/08/25 0822    isosorbide dinitrate (ISORDIL) tablet 10 mg  10 mg Oral TID - Nitrates Dc Issa DO   10 mg at 07/08/25 1802    levothyroxine (SYNTHROID, LEVOTHROID) tablet 100 mcg  100 mcg Oral Q AM Dc Issa DO   100 mcg at 07/09/25 0617    montelukast (SINGULAIR) tablet 10 mg  10 mg Oral Nightly Dc Issa DO   10 mg at 07/08/25 2019    NIFEdipine XL (PROCARDIA XL) 24 hr tablet 120 mg  120 mg Oral Daily Vidal Miramontes MD   120 mg at 07/08/25 0822    oxyCODONE-acetaminophen (PERCOCET) 5-325 MG per tablet 1 tablet  1 tablet Oral Q6H PRN Dc Issa DO   1 tablet at 07/09/25 0617    pantoprazole (PROTONIX) EC tablet 40 mg  40 mg Oral Q AM Dc Issa DO   40 mg at 07/09/25 0617    Pharmacy To Dose: Cefepime   Not Applicable Continuous PRN Ramon Aguilera MD        sodium chloride 0.9 % flush 10 mL  10 mL Intravenous Q12H Dc Issa DO   10 mL at 07/08/25 2020    sodium chloride 0.9 % flush 10 mL  10 mL Intravenous PRN Dc Issa DO        sodium chloride 0.9 % infusion 40 mL  40 mL Intravenous PRN Dc Issa DO        tamsulosin (FLOMAX) 24 hr capsule 0.4 mg  0.4 mg Oral Nightly Vidal Miramontes MD   0.4 mg at 07/08/25 2019    valsartan (DIOVAN) tablet 320 mg  320 mg Oral Daily Vidal Miramontes MD   320 mg at 07/08/25 0822       Past Medical History:  Past Medical History:   Diagnosis Date    3-vessel CAD 08/11/2020    Allergic rhinitis     Anemia     Anxiety disorder 04/27/2020    Arthritis     Asymmetrical sensorineural hearing loss 06/28/2017    Atherosclerosis of native artery of both lower extremities with intermittent claudication 07/18/2019    Avascular necrosis of femoral head, left 07/11/2020    right hip after surgery    Carotid stenosis     Chronic mucoid otitis  media     Chronic rhinitis     COPD (chronic obstructive pulmonary disease)     Coronary artery disease     HEART BYPASS 2004    Crohn's disease of large intestine with other complication 07/30/2020    Chronic diarrhea Colonoscopy July 2020 revealed mild patchy scattered hemosiderin staining with inflammation more so in rectosigmoid area.  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    Iron deficiency anemia 07/14/2020    Mixed hearing loss of left ear     PAD (peripheral artery disease) 08/11/2020    Perianal abscess     Pernicious anemia 08/17/2020    took shots but never diagnosed with b12 deficiency    Personal history of alcoholism 08/11/2020    quit drinking in 2013    Prostatic hypertrophy 08/11/2020    Sensorineural hearing loss     Sepsis with acute renal failure 09/15/2020    Shortness of breath 05/27/2021    Tinnitus     Ventricular tachycardia, nonsustained 07/14/2020    Weight loss 07/11/2020       Past Surgical History:  Past Surgical History:   Procedure Laterality Date    ABOVE KNEE AMPUTATION Right 6/24/2025    Procedure: right above knee amputation;  Surgeon: Blayne Zabala MD;  Location:  [facility] OR;  Service: Vascular;  Laterality: Right;    AORTOGRAM Right 4/25/2025    Procedure: RIGHT LOWER EXTREMITY ANGIOGRAM, SHOCKWAVE LITHOTRIPSY, BALLOON ANGIOPLASTY, MYNX CLOSURE;  Surgeon: Gil Pineda DO;  Location:  [facility] HYBRID OR;  Service: Vascular;  Laterality: Right;    AORTOGRAM Left 5/22/2025    Procedure: LEFT LOWER EXTREMITY ANGIOGRAM, SHOCKWAVE LITHOTRIPSY, BALLOON ANGIOPLASTY, STENT PLACEMENT, MYNX CLOSURE;  Surgeon: Blayne Zabala  MD;  Location: [facility] HYBRID OR;  Service: Vascular;  Laterality: Left;    AORTOGRAM Right 5/30/2025    Procedure: AORTOILIAC ANGIOGRAM WITH LEFT LOWER EXTREMITY ANGIOGRAM;  Surgeon: Gil Pineda DO;  Location: [facility] HYBRID OR;  Service: Vascular;  Laterality: Right;    AORTOGRAM Right 6/20/2025    Procedure: right lower extremity arteriogram, shockwave lithotripsy, balloon angioplasty, mynx closue;  Surgeon: Blayne Zabala MD;  Location: [facility] HYBRID OR;  Service: Vascular;  Laterality: Right;    ARTERY SURGERY  2021    right side on neck    CAROTID ENDARTERECTOMY Right 05/10/2021    Procedure: RIGHT CAROTID ENDARTERECTOMY WITH EEG;  Surgeon: Gil Pineda DO;  Location: [facility] HYBRID OR 12;  Service: Vascular;  Laterality: Right;    COLONOSCOPY N/A 07/02/2020    Procedure: COLONOSCOPY WITH ANESTHESIA;  Surgeon: Adrien Brewster MD;  Location: [facility] ENDOSCOPY;  Service: Gastroenterology;  Laterality: N/A;  pre op: diarrhea  post op: polyps  PCP: Joe Velasco MD    COLONOSCOPY N/A 10/13/2020    Procedure: COLONOSCOPY WITH ANESTHESIA;  Surgeon: Adrien Brewster MD;  Location: [facility] ENDOSCOPY;  Service: Gastroenterology;  Laterality: N/A;  Pre: Chronic Diarrhea, Crohn's  Post: AVM  Dr. Neftali Velasco  CO2 Inflation Used    COLONOSCOPY N/A 12/08/2023    Procedure: COLONOSCOPY WITH ANESTHESIA;  Surgeon: Adrien Brewster MD;  Location: [facility] ENDOSCOPY;  Service: Gastroenterology;  Laterality: N/A;  pre chrone's disease  post sub optimal prep, polyp, chrone's      CORONARY ARTERY BYPASS GRAFT  2003    x3    ENDOSCOPY N/A 11/02/2021    Procedure: ESOPHAGOGASTRODUODENOSCOPY WITH ANESTHESIA;  Surgeon: Bridger Bell MD;  Location: [facility] ENDOSCOPY;  Service: Gastroenterology;  Laterality: N/A;  pre anemia;gi bleed  post  gi bleed;schatski ring  Dr. ERIC Velasco    ENDOSCOPY N/A 10/10/2023    Procedure: ESOPHAGOGASTRODUODENOSCOPY WITH ANESTHESIA;  Surgeon: Adrien Brewster MD;   Location:  [facility] ENDOSCOPY;  Service: Gastroenterology;  Laterality: N/A;  preop; anemia  postop esophagitis ; R/O barretts   PCP Randall Beata    EYE SURGERY Bilateral     catorac    INCISION AND DRAINAGE PERIRECTAL ABSCESS N/A 2017    Procedure: INCISION AND DRAINAGE OF JEET ANAL ABSCESS;  Surgeon: Lynette Smith MD;  Location:  [facility] OR;  Service:     INGUINAL HERNIA REPAIR Bilateral 2023    Procedure: INGUINAL HERNIA BILATERAL REPAIR LAPAROSCOPIC WITH DAVINCI ROBOT WITH MESH;  Surgeon: Tahira Rivera MD;  Location:  [facility] OR;  Service: Robotics - DaVinci;  Laterality: Bilateral;    INSERTION HEMODIALYSIS CATHETER N/A 2025    Procedure: HEMODIALYSIS CATHETER PLACEMENT;  Surgeon: Gil Pineda DO;  Location:  [facility] HYBRID OR;  Service: Vascular;  Laterality: N/A;    MYRINGOTOMY W/ TUBES Left 2017    06/10/2016    TONSILLECTOMY      TOTAL HIP ARTHROPLASTY Right        Family History  Family History   Problem Relation Age of Onset    Breast cancer Mother     Dementia Father     Glaucoma Father     No Known Problems Daughter     Colon polyps Neg Hx     Colon cancer Neg Hx        Social History  Social History     Socioeconomic History    Marital status:    Tobacco Use    Smoking status: Former     Current packs/day: 0.00     Average packs/day: 0.5 packs/day for 25.8 years (12.9 ttl pk-yrs)     Types: Cigarettes     Start date:      Quit date: 10/13/2013     Years since quittin.7     Passive exposure: Past    Smokeless tobacco: Never    Tobacco comments:     quit 2013   Vaping Use    Vaping status: Former    Substances: Nicotine    Devices: Pre-filled or refillable cartridge   Substance and Sexual Activity    Alcohol use: Not Currently    Drug use: No    Sexual activity: Not Currently     Partners: Female       Review of Systems:  History obtained from chart review and the patient  General ROS: No fever or chills  Respiratory ROS: No cough, shortness of breath,  wheezing  Cardiovascular ROS: No chest pain or palpitations  Gastrointestinal ROS: No abdominal pain or melena  Genito-Urinary ROS: No dysuria or hematuria  Psych ROS: No anxiety and depression  14 point ROS reviewed with the patient and negative except as noted above and in the HPI unless unable to obtain.    Objective:  Patient Vitals for the past 24 hrs:   BP Temp Temp src Pulse Resp SpO2 Weight   07/09/25 0700 154/60 97.7 °F (36.5 °C) Axillary 76 16 95 % --   07/09/25 0555 -- -- -- 74 16 95 % --   07/09/25 0549 -- -- -- 74 16 95 % --   07/09/25 0413 138/50 97.9 °F (36.6 °C) Oral 69 18 94 % 53.8 kg (118 lb 11.2 oz)   07/08/25 2017 137/54 97.8 °F (36.6 °C) Oral 78 16 96 % --   07/08/25 1930 -- -- -- 74 16 97 % --   07/08/25 1928 -- -- -- 72 16 97 % --   07/08/25 1500 143/50 98.4 °F (36.9 °C) Oral 73 16 96 % --   07/08/25 1428 -- -- -- 72 16 97 % --   07/08/25 1100 128/49 99 °F (37.2 °C) Axillary 70 16 96 % --   07/08/25 0957 -- -- -- 73 16 100 % --   07/08/25 0951 -- -- -- 74 16 96 % --       Intake/Output Summary (Last 24 hours) at 7/9/2025 0931  Last data filed at 7/9/2025 0600  Gross per 24 hour   Intake --   Output 800 ml   Net -800 ml     General: awake/alert   Chest:  clear to auscultation bilaterally without respiratory distress  CVS: regular rate and rhythm  Abdominal: soft, nontender, positive bowel sounds  Extremities: right AKA and no cyanosis or edema  Skin: warm and dry without rash      Labs:  Results from last 7 days   Lab Units 07/09/25  0326 07/07/25  0410 07/06/25  0537   WBC 10*3/mm3 9.98 16.96* 22.70*   HEMOGLOBIN g/dL 7.9* 8.6* 8.7*   HEMATOCRIT % 25.5* 27.0* 27.4*   PLATELETS 10*3/mm3 148 157 159         Results from last 7 days   Lab Units 07/09/25  0326 07/08/25  0506 07/07/25  0410 07/06/25  0537   SODIUM mmol/L 135* 135* 134* 132*   POTASSIUM mmol/L 3.9 3.6 3.9 3.7   CHLORIDE mmol/L 98 97* 100 97*   CO2 mmol/L 25.0 26.0 22.0 25.0   BUN mg/dL 43.0* 27.3* 42.9* 33.9*   CREATININE mg/dL  2.88* 2.32* 3.10* 2.47*   CALCIUM mg/dL 9.1 8.9 9.0 8.5*   EGFR mL/min/1.73 21.8* 28.2* 19.9* 26.2*   BILIRUBIN mg/dL 0.2  --  0.2 0.4   ALK PHOS U/L 287*  --  268* 312*   ALT (SGPT) U/L 20  --  29 48*   AST (SGOT) U/L 18  --  23 51*   GLUCOSE mg/dL 101* 115* 96 94       Radiology:   Imaging Results (Last 72 Hours)       ** No results found for the last 72 hours. **            Culture:  Blood Culture   Date Value Ref Range Status   07/05/2025 No growth at 24 hours  Preliminary   07/05/2025 No growth at 24 hours  Preliminary     Urine Culture   Date Value Ref Range Status   07/05/2025 Culture in progress  Preliminary         Assessment    End stage renal disease on HD MWF  Hypertension  Recent right AKA  Sepsis   Anemia of CKD  Chronic diastolic CHF  Hyponatremia     Plan:   Dialysis today  Monitor labs      Slava Tate, APRN  7/9/2025  09:31 CDT

## 2025-07-09 NOTE — CASE MANAGEMENT/SOCIAL WORK
Continued Stay Note  [facility]     Patient Name: Ricardo Hugo  MRN: [id number]  Today's Date: 7/9/2025    Admit Date: 7/5/2025    Plan: [facility]   Discharge Plan       Row Name 07/09/25 1117       Plan    Plan [facility]    Plan Comments Patient was actually admitted from [facility].  Therapy has recommended patient return to [facility] at discharge.  Patient does not have a bed hold but can return to [facility] pending bed availability [phone].  Patient attends outpatient dialysis as well.    Final Discharge Disposition Code 03 - skilled nursing facility (SNF)                   Discharge Codes    No documentation.                       CARLITOS Long

## 2025-07-09 NOTE — TELEPHONE ENCOUNTER
Spoke with Kathleen and informed of new appointments on 7/24/2025 at 0800 and 1045 and she has voiced understanding.

## 2025-07-10 LAB
ANION GAP SERPL CALCULATED.3IONS-SCNC: 12 MMOL/L (ref 5–15)
BACTERIA SPEC AEROBE CULT: NORMAL
BACTERIA SPEC AEROBE CULT: NORMAL
BUN SERPL-MCNC: 26.8 MG/DL (ref 8–23)
BUN/CREAT SERPL: 13.4 (ref 7–25)
CALCIUM SPEC-SCNC: 9 MG/DL (ref 8.6–10.5)
CHLORIDE SERPL-SCNC: 100 MMOL/L (ref 98–107)
CO2 SERPL-SCNC: 25 MMOL/L (ref 22–29)
CREAT SERPL-MCNC: 2 MG/DL (ref 0.76–1.27)
EGFRCR SERPLBLD CKD-EPI 2021: 33.7 ML/MIN/1.73
GLUCOSE SERPL-MCNC: 88 MG/DL (ref 65–99)
HCT VFR BLD AUTO: 28.3 % (ref 37.5–51)
HGB BLD-MCNC: 8.6 G/DL (ref 13–17.7)
POTASSIUM SERPL-SCNC: 3.6 MMOL/L (ref 3.5–5.2)
SODIUM SERPL-SCNC: 137 MMOL/L (ref 136–145)

## 2025-07-10 PROCEDURE — 25010000002 HEPARIN (PORCINE) PER 1000 UNITS: Performed by: INTERNAL MEDICINE

## 2025-07-10 PROCEDURE — 94664 DEMO&/EVAL PT USE INHALER: CPT

## 2025-07-10 PROCEDURE — 25010000002 CEFEPIME PER 500 MG: Performed by: FAMILY MEDICINE

## 2025-07-10 PROCEDURE — 94799 UNLISTED PULMONARY SVC/PX: CPT

## 2025-07-10 PROCEDURE — 85014 HEMATOCRIT: CPT | Performed by: FAMILY MEDICINE

## 2025-07-10 PROCEDURE — 80048 BASIC METABOLIC PNL TOTAL CA: CPT | Performed by: INTERNAL MEDICINE

## 2025-07-10 PROCEDURE — 94762 N-INVAS EAR/PLS OXIMTRY CONT: CPT

## 2025-07-10 PROCEDURE — 85018 HEMOGLOBIN: CPT | Performed by: FAMILY MEDICINE

## 2025-07-10 PROCEDURE — 97530 THERAPEUTIC ACTIVITIES: CPT

## 2025-07-10 RX ADMIN — HEPARIN SODIUM 5000 UNITS: 5000 INJECTION INTRAVENOUS; SUBCUTANEOUS at 21:02

## 2025-07-10 RX ADMIN — LEVOTHYROXINE SODIUM 100 MCG: 0.1 TABLET ORAL at 06:05

## 2025-07-10 RX ADMIN — HYDRALAZINE HYDROCHLORIDE 50 MG: 50 TABLET ORAL at 08:25

## 2025-07-10 RX ADMIN — IPRATROPIUM BROMIDE 0.5 MG: 0.5 SOLUTION RESPIRATORY (INHALATION) at 06:05

## 2025-07-10 RX ADMIN — MONTELUKAST 10 MG: 10 TABLET, FILM COATED ORAL at 21:02

## 2025-07-10 RX ADMIN — HYDRALAZINE HYDROCHLORIDE 50 MG: 50 TABLET ORAL at 17:30

## 2025-07-10 RX ADMIN — DOCUSATE SODIUM 50 MG AND SENNOSIDES 8.6 MG 2 TABLET: 8.6; 5 TABLET, FILM COATED ORAL at 21:02

## 2025-07-10 RX ADMIN — OXYCODONE HYDROCHLORIDE AND ACETAMINOPHEN 1 TABLET: 5; 325 TABLET ORAL at 18:06

## 2025-07-10 RX ADMIN — IPRATROPIUM BROMIDE 0.5 MG: 0.5 SOLUTION RESPIRATORY (INHALATION) at 13:32

## 2025-07-10 RX ADMIN — VALSARTAN 320 MG: 80 TABLET, FILM COATED ORAL at 08:25

## 2025-07-10 RX ADMIN — OXYCODONE HYDROCHLORIDE AND ACETAMINOPHEN 1 TABLET: 5; 325 TABLET ORAL at 12:30

## 2025-07-10 RX ADMIN — CLOPIDOGREL BISULFATE 75 MG: 75 TABLET, FILM COATED ORAL at 08:26

## 2025-07-10 RX ADMIN — IPRATROPIUM BROMIDE 0.5 MG: 0.5 SOLUTION RESPIRATORY (INHALATION) at 19:00

## 2025-07-10 RX ADMIN — CARVEDILOL 6.25 MG: 6.25 TABLET, FILM COATED ORAL at 17:30

## 2025-07-10 RX ADMIN — NIFEDIPINE 90 MG: 60 TABLET, FILM COATED, EXTENDED RELEASE ORAL at 08:26

## 2025-07-10 RX ADMIN — TAMSULOSIN HYDROCHLORIDE 0.4 MG: 0.4 CAPSULE ORAL at 21:01

## 2025-07-10 RX ADMIN — GABAPENTIN 300 MG: 300 CAPSULE ORAL at 21:01

## 2025-07-10 RX ADMIN — ASPIRIN 81 MG: 81 TABLET, COATED ORAL at 08:26

## 2025-07-10 RX ADMIN — CEFEPIME 2000 MG: 2 INJECTION, POWDER, FOR SOLUTION INTRAVENOUS at 17:30

## 2025-07-10 RX ADMIN — ATORVASTATIN CALCIUM 10 MG: 10 TABLET, FILM COATED ORAL at 08:27

## 2025-07-10 RX ADMIN — ISOSORBIDE DINITRATE 10 MG: 10 TABLET ORAL at 17:30

## 2025-07-10 RX ADMIN — Medication 10 ML: at 08:27

## 2025-07-10 RX ADMIN — Medication 10 ML: at 21:02

## 2025-07-10 RX ADMIN — Medication 150 MG: at 08:26

## 2025-07-10 RX ADMIN — PANTOPRAZOLE SODIUM 40 MG: 40 TABLET, DELAYED RELEASE ORAL at 06:05

## 2025-07-10 RX ADMIN — CALCITRIOL CAPSULES 0.25 MCG 0.5 MCG: 0.25 CAPSULE ORAL at 08:26

## 2025-07-10 RX ADMIN — HEPARIN SODIUM 5000 UNITS: 5000 INJECTION INTRAVENOUS; SUBCUTANEOUS at 08:26

## 2025-07-10 RX ADMIN — BUDESONIDE AND FORMOTEROL FUMARATE DIHYDRATE 2 PUFF: 160; 4.5 AEROSOL RESPIRATORY (INHALATION) at 19:00

## 2025-07-10 RX ADMIN — CARVEDILOL 6.25 MG: 6.25 TABLET, FILM COATED ORAL at 08:26

## 2025-07-10 RX ADMIN — HYDRALAZINE HYDROCHLORIDE 50 MG: 50 TABLET ORAL at 21:02

## 2025-07-10 RX ADMIN — ISOSORBIDE DINITRATE 10 MG: 10 TABLET ORAL at 12:30

## 2025-07-10 RX ADMIN — OXYCODONE HYDROCHLORIDE AND ACETAMINOPHEN 1 TABLET: 5; 325 TABLET ORAL at 06:05

## 2025-07-10 RX ADMIN — BUDESONIDE AND FORMOTEROL FUMARATE DIHYDRATE 2 PUFF: 160; 4.5 AEROSOL RESPIRATORY (INHALATION) at 06:11

## 2025-07-10 RX ADMIN — EMPAGLIFLOZIN 10 MG: 10 TABLET, FILM COATED ORAL at 08:26

## 2025-07-10 RX ADMIN — ISOSORBIDE DINITRATE 10 MG: 10 TABLET ORAL at 08:27

## 2025-07-10 NOTE — NURSING NOTE
[facility]  INPATIENT WOUND & OSTOMY CARE    Today's Date: 07/10/25    Patient Name: Ricardo Hugo  MRN: [id number]  CSN: [id number]  PCP: Nathan Zimmer MD  Attending Provider: Ramon Aguilera MD  Length of Stay: 5    Wound care was asked to come look at a couple of scabby places on the posterior aspect of right AKA.     Patient has two scabs that look to be from friction. Places should stay covered for protection and patient educated to keep stump elevated to relieve pressure. Patient is at risk of breakdown due to limited mobility.     Wound RN Orders (last 12 hours) (12h ago, onward)       Start     Ordered    07/10/25 1030  Wound Care  Daily      Question Answer Comment   Wound Locations right posterior AKA stump site    Wound Care Instructions cleanse with normal saline and apply a silicone bordered foam    Cleanse Normal Saline    Dressing: Silicone Border Dressing        07/10/25 1029    07/10/25 1030  Specialty Bed Dolphin FIS Mattress  Once        Comments: For Dolphin FIS Mattress, call [employer] to order a [employer]limit bed frame.  If bariatric/bariatric dolphin, [employer] provides frame, mattress, pump, and trapeze (if needed).  Nurse or HUC is to call [employer], the rental company, to order the equipment, provide patient's name, room number, and if in isolation. [phone].   Question:  Specialty Bed needed  Answer:  Dolphin FIS Mattress    07/10/25 1029    Unscheduled  Wound Care  As Needed      Question:  Wound Care Instructions  Answer:  Apply Moisture Barrier After Any Incontinence    07/08/25 1236          Inpatient wound care will continue to follow during hospital stay.  Please contact if any issues or concerns arise.     This document has been electronically signed by Amber Waggoner RN on 7/10/2025 10:29 CDT

## 2025-07-10 NOTE — CASE MANAGEMENT/SOCIAL WORK
Continued Stay Note  [facility]     Patient Name: Ricardo Hugo  MRN: [id number]  Today's Date: 7/10/2025    Admit Date: 7/5/2025    Plan: [facility]   Discharge Plan       Row Name 07/10/25 0948       Plan    Plan [facility]    Patient/Family in Agreement with Plan yes    Plan Comments SW spoke to Charito at [facility] and they can accept pt today.  Pharmacy has been changed in [facility].  Phone:   [phone] Fax: [phone].                   Discharge Codes    No documentation.                 Expected Discharge Date and Time       Expected Discharge Date Expected Discharge Time    Jul 12, 2025               CARLITOS Booker

## 2025-07-10 NOTE — PROGRESS NOTES
[facility] Medicine Services  INPATIENT PROGRESS NOTE    Patient Name: Ricardo Hugo  Date of Admission: 7/5/2025  Today's Date: 07/10/25  Length of Stay: 5  Primary Care Physician: Nathan Zimmer MD    Subjective   Chief Complaint: Confusion, fevers  Altered Mental Status     75-year-old male with history of coronary artery disease status post CABG, chronic diastolic cardiomyopathy, end-stage renal disease on hemodialysis Monday Wednesday and Friday, anemia of prior kidney disease, hypertension, right above-the-knee amputation June 24, 2025, chronic obstructive pulmonary disease, hyperlipidemia, chronic lymphocytic leukemia, admitted to the hospital July 5, 2025 as per report, the patient was found confused and febrile in the dialysis center.  He was diagnosed with a urinary tract infection and started on antibiotic treatment.  He has received ceftriaxone, today is day #4.    I started taking care of this patient on 7/9/2025.  Reports no fevers. I evaluated patient during hemodialysis.  Reports no abdominal pain.  He is found extremely weak, following commands and oriented.        Review of Systems   All pertinent negatives and positives are as above. All other systems have been reviewed and are negative unless otherwise stated.     Objective    Temp:  [97.5 °F (36.4 °C)-99 °F (37.2 °C)] 98.2 °F (36.8 °C)  Heart Rate:  [59-82] 63  Resp:  [14-18] 14  BP: ()/(50-72) 157/50  Physical Exam  Constitutional:       Appearance: Alert and oriented x 3, no respiratory distress; chronically ill-appearing.  Malnourished.  HENT:      Head: Normocephalic and atraumatic.      Nose: Nose normal.      Mouth/Throat:      Mouth: Mucous membranes are moist.      Pharynx: Oropharynx is clear.   Eyes:      Extraocular Movements: Extraocular movements intact.      Conjunctiva/sclera: Conjunctivae normal.      Pupils: Pupils are equal, round  Cardiovascular:      Rate and Rhythm: Normal  rate and regular rhythm.      Pulses: Normal pulses.   Pulmonary:      Effort: No respiratory distress.      Breath sounds: Normal breath sounds.  Abdominal:      General: Abdomen is flat. Bowel sounds are normal.      Palpations: Abdomen is soft.   Extremities:  No lower extremity edema.  Skin:     Capillary Refill: Capillary refill takes less than 2 seconds.      Coloration: Skin is not jaundiced.   Neurological:      General: No focal deficit present.      Mental Status: Patient is alert, oriented to place time and person.          Results Review:  I have reviewed the labs, radiology results, and diagnostic studies.    Laboratory Data:   Results from last 7 days   Lab Units 07/10/25  0439 07/09/25  0326 07/07/25  0410 07/06/25  0537   WBC 10*3/mm3  --  9.98 16.96* 22.70*   HEMOGLOBIN g/dL 8.6* 7.9* 8.6* 8.7*   HEMATOCRIT % 28.3* 25.5* 27.0* 27.4*   PLATELETS 10*3/mm3  --  148 157 159        Results from last 7 days   Lab Units 07/10/25  0439 07/09/25 0326 07/08/25  0506 07/07/25  0410 07/06/25  0537   SODIUM mmol/L 137 135* 135* 134* 132*   POTASSIUM mmol/L 3.6 3.9 3.6 3.9 3.7   CHLORIDE mmol/L 100 98 97* 100 97*   CO2 mmol/L 25.0 25.0 26.0 22.0 25.0   BUN mg/dL 26.8* 43.0* 27.3* 42.9* 33.9*   CREATININE mg/dL 2.00* 2.88* 2.32* 3.10* 2.47*   CALCIUM mg/dL 9.0 9.1 8.9 9.0 8.5*   BILIRUBIN mg/dL  --  0.2  --  0.2 0.4   ALK PHOS U/L  --  287*  --  268* 312*   ALT (SGPT) U/L  --  20  --  29 48*   AST (SGOT) U/L  --  18  --  23 51*   GLUCOSE mg/dL 88 101* 115* 96 94       Culture Data:   Blood Culture   Date Value Ref Range Status   07/05/2025 No growth at 3 days  Preliminary   07/05/2025 No growth at 3 days  Preliminary     Urine Culture   Date Value Ref Range Status   07/05/2025 >100,000 CFU/mL Pseudomonas aeruginosa MDRO (A)  Preliminary     Comment:     Pip/sonja and ceftazidime results to follow.  Multi drug resistant Pseudomonas, patient may be an isolation risk.       Radiology Data:   Imaging Results (Last 24  Hours)       ** No results found for the last 24 hours. **            I have reviewed the patient's current medications.     Assessment/Plan   Assessment  Active Hospital Problems    Diagnosis     **Sepsis     Elevated troponin     Coronary artery disease involving native coronary artery of native heart without angina pectoris     ESRD (end stage renal disease) on dialysis     Chronic diastolic (congestive) heart failure     Anemia due to chronic kidney disease     Peripheral arterial disease     IHD (ischemic heart disease)     Essential hypertension      Severe sepsis  Complicated urinary tract infection Pseudomonas MDRO  Non-ST elevation myocardial infarction type 2  End-stage renal disease on hemodialysis  Severe protein calorie malnutrition  Coronary artery disease status post CABG  Peripheral vascular disease  Hypertension  Chronic diastolic cardiomyopathy  Anemia of chronic kidney disease        On admission, temperature was above 38 Celsius, leukocytosis above 20,000, lactic acid was 2.0.    Blood cultures negative after 3 days    Urine culture with Pseudomonas aeruginosa MDRO, sensitive to cefepime, amikacin and ceftazidime.      Sodium 135 potassium 3.9, BUN 43 creatinine 2.88.  Hemoglobin 7.9, hematocrit 25.5.    White blood cell count down from 22,000 to 9,900.      Treatment Plan  Received 4 days of ceftriaxone.  Now on cefepime 2 grams IV daily, day# 2, adjusted for renal function.  Anticipate total treatment of 7 days.    Continue aspirin, atorvastatin, Plavix, Jardiance.    Decreased nifedipine XL from 120 to 90 mg p.o. daily.  Restart hydralazine PO.  Continue carvedilol current dose.  Continue valsartan 320 mg p.o. daily.    Continue Flomax  Continue Protonix.    Hemodialysis per Nephrology, M-W-F.    Nutritional support.    Heparin for DVT prophylaxis.      Anticipate discharge to prior setting. Will need 2 more days of antibiotics IV.            Medical Decision Making  Number and Complexity of  problems: 10, high complexity    Differential diagnosis: see above    Conditions and Status        Condition is unchanged.     [facility] Data  External documents reviewed: no  Cardiac tracing (EKG, telemetry) interpretation: no new  Radiology interpretation: no new  Labs reviewed: yes  Any tests that were considered but not ordered: no     Decision rules/scores evaluated (example [id number], [place], etc): none     Discussed with: patient     Care Planning  Shared decision making: patient  Code status and discussions: FC    Disposition  Social Determinants of Health that impact treatment or disposition: none  I expect the patient to be discharged to SNF        Electronically signed by Ramon Aguilera MD, 07/10/25, 09:30 CDT.

## 2025-07-10 NOTE — PLAN OF CARE
Goal Outcome Evaluation:         BP improved after hydralazine restarted, continues on RA, HR NSR 63-80 with PVC MF PAC and Coup, prn pain med given for c/o left foot pain, staples intact to rt AKA, scab x 2 noted to posterior right thigh, photo taken, foam dressing applied, coccyx red nonblancable intact, now on [employer] mattress, small bm today, urine odorous, IV abx continue, plans for dialysis tomorrow

## 2025-07-10 NOTE — THERAPY TREATMENT NOTE
Acute Care - Physical Therapy Treatment Note  [facility]     Patient Name: Ricardo Hugo  : 1950  MRN: [id number]  Today's Date: 7/10/2025      Visit Dx:     ICD-10-CM ICD-9-CM   1. Acute cystitis without hematuria  N30.00 595.0   2. Elevated troponin  R79.89 790.6   3. Confusion  R41.0 298.9   4. Impaired mobility [Z74.09]  Z74.09 799.89     Patient Active Problem List   Diagnosis    Chronic rhinitis    Essential hypertension    Resistant hypertension    Chronic diarrhea    Symptomatic anemia    Wellness examination    Peripheral arterial disease    IHD (ischemic heart disease)    Carotid stenosis    Stenosis of right carotid artery    Carotid stenosis, right    Diastolic dysfunction    CKD (chronic kidney disease) stage 5    Anemia due to chronic kidney disease    Copper deficiency    Low iron     CLL (chronic lymphocytic leukemia)    Perforation of left tympanic membrane    Mixed hyperlipidemia    Systolic murmur    Eustachian tube dysfunction, bilateral    Sensorineural hearing loss (SNHL), bilateral    Anticoagulated    Nasal septal deviation    Hypertrophy of inferior nasal turbinate    Unilateral recurrent inguinal hernia without obstruction or gangrene    Bilateral inguinal hernia without obstruction or gangrene    Sleep difficulties    Dermatitis associated with moisture    Proteinuria    Chronic diastolic (congestive) heart failure    Bradycardia    History of pneumonia, current treatment with cefdinir    Abnormal TSH    ESRD (end stage renal disease) on dialysis    Preop testing    Anemia, multifactorial to include chronic kidney disease, iron deficiency and possible bleed    Acute pain of left lower extremity    Hyperkalemia    Arterial occlusion    Thrombocytopenia    Severe protein-calorie malnutrition    Chronic heart failure with preserved ejection fraction (HFpEF)    Vascular occlusion    COPD (chronic obstructive pulmonary disease)    Sepsis    Elevated troponin    Coronary artery  disease involving native coronary artery of native heart without angina pectoris     Past Medical History:   Diagnosis Date    3-vessel CAD 08/11/2020    Allergic rhinitis     Anemia     Anxiety disorder 04/27/2020    Arthritis     Asymmetrical sensorineural hearing loss 06/28/2017    Atherosclerosis of native artery of both lower extremities with intermittent claudication 07/18/2019    Avascular necrosis of femoral head, left 07/11/2020    right hip after surgery    Carotid stenosis     Chronic mucoid otitis media     Chronic rhinitis     COPD (chronic obstructive pulmonary disease)     Coronary artery disease     HEART BYPASS 2004    Crohn's disease of large intestine with other complication 07/30/2020    Chronic diarrhea Colonoscopy July 2020 revealed mild patchy scattered hemosiderin staining with inflammation more so in rectosigmoid area.  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    Iron deficiency anemia 07/14/2020    Mixed hearing loss of left ear     PAD (peripheral artery disease) 08/11/2020    Perianal abscess     Pernicious anemia 08/17/2020    took shots but never diagnosed with b12 deficiency    Personal history of alcoholism 08/11/2020    quit drinking in 2013    Prostatic hypertrophy 08/11/2020    Sensorineural hearing loss     Sepsis with acute renal failure 09/15/2020    Shortness of breath 05/27/2021    Tinnitus     Ventricular tachycardia, nonsustained 07/14/2020    Weight loss 07/11/2020     Past Surgical History:   Procedure Laterality Date    ABOVE KNEE AMPUTATION Right 6/24/2025    Procedure: right above knee amputation;  Surgeon: Vj  MD Blayne;  Location: [facility] OR;  Service: Vascular;  Laterality: Right;    AORTOGRAM Right 4/25/2025    Procedure: RIGHT LOWER EXTREMITY ANGIOGRAM, SHOCKWAVE LITHOTRIPSY, BALLOON ANGIOPLASTY, MYNX CLOSURE;  Surgeon: Gil Pineda DO;  Location: [facility] HYBRID OR;  Service: Vascular;  Laterality: Right;    AORTOGRAM Left 5/22/2025    Procedure: LEFT LOWER EXTREMITY ANGIOGRAM, SHOCKWAVE LITHOTRIPSY, BALLOON ANGIOPLASTY, STENT PLACEMENT, MYNX CLOSURE;  Surgeon: Blayne Zabala MD;  Location: [facility] HYBRID OR;  Service: Vascular;  Laterality: Left;    AORTOGRAM Right 5/30/2025    Procedure: AORTOILIAC ANGIOGRAM WITH LEFT LOWER EXTREMITY ANGIOGRAM;  Surgeon: Gil Pineda DO;  Location: [facility] HYBRID OR;  Service: Vascular;  Laterality: Right;    AORTOGRAM Right 6/20/2025    Procedure: right lower extremity arteriogram, shockwave lithotripsy, balloon angioplasty, mynx closue;  Surgeon: Blayne Zabala MD;  Location: [facility] HYBRID OR;  Service: Vascular;  Laterality: Right;    ARTERY SURGERY  2021    right side on neck    CAROTID ENDARTERECTOMY Right 05/10/2021    Procedure: RIGHT CAROTID ENDARTERECTOMY WITH EEG;  Surgeon: Gil Pineda DO;  Location: [facility] HYBRID OR 12;  Service: Vascular;  Laterality: Right;    COLONOSCOPY N/A 07/02/2020    Procedure: COLONOSCOPY WITH ANESTHESIA;  Surgeon: Adrien Brewster MD;  Location: [facility] ENDOSCOPY;  Service: Gastroenterology;  Laterality: N/A;  pre op: diarrhea  post op: polyps  PCP: Joe Velasco MD    COLONOSCOPY N/A 10/13/2020    Procedure: COLONOSCOPY WITH ANESTHESIA;  Surgeon: Adrien Brewster MD;  Location: [facility] ENDOSCOPY;  Service: Gastroenterology;  Laterality: N/A;  Pre: Chronic Diarrhea, Crohn's  Post: AVM  Dr. Neftali Velasco  CO2 Inflation Used    COLONOSCOPY N/A 12/08/2023    Procedure: COLONOSCOPY WITH ANESTHESIA;  Surgeon: Adrien Brewster MD;  Location: [facility] ENDOSCOPY;  Service: Gastroenterology;  Laterality: N/A;  pre chrone's  disease  post sub optimal prep, polyp, chrone's      CORONARY ARTERY BYPASS GRAFT  2003    x3    ENDOSCOPY N/A 11/02/2021    Procedure: ESOPHAGOGASTRODUODENOSCOPY WITH ANESTHESIA;  Surgeon: Bridger Bell MD;  Location: [facility] ENDOSCOPY;  Service: Gastroenterology;  Laterality: N/A;  pre anemia;gi bleed  post  gi bleed;schatski ring  Dr. ERIC Velasco    ENDOSCOPY N/A 10/10/2023    Procedure: ESOPHAGOGASTRODUODENOSCOPY WITH ANESTHESIA;  Surgeon: Adrien Brewster MD;  Location: [facility] ENDOSCOPY;  Service: Gastroenterology;  Laterality: N/A;  preop; anemia  postop esophagitis ; R/O barretts   PCP Randall Beata    EYE SURGERY Bilateral     catorac    INCISION AND DRAINAGE PERIRECTAL ABSCESS N/A 03/03/2017    Procedure: INCISION AND DRAINAGE OF JEET ANAL ABSCESS;  Surgeon: Lynette Smith MD;  Location: [facility] OR;  Service:     INGUINAL HERNIA REPAIR Bilateral 06/27/2023    Procedure: INGUINAL HERNIA BILATERAL REPAIR LAPAROSCOPIC WITH DAVINCI ROBOT WITH MESH;  Surgeon: Tahira Rivera MD;  Location: [facility] OR;  Service: Robotics - DaVinci;  Laterality: Bilateral;    INSERTION HEMODIALYSIS CATHETER N/A 4/16/2025    Procedure: HEMODIALYSIS CATHETER PLACEMENT;  Surgeon: Gil Pineda DO;  Location: [facility] HYBRID OR;  Service: Vascular;  Laterality: N/A;    MYRINGOTOMY W/ TUBES Left 04/17/2017    06/10/2016    TONSILLECTOMY      TOTAL HIP ARTHROPLASTY Right 2006     PT Assessment (Last 12 Hours)       PT Evaluation and Treatment       Row Name 07/10/25 1129          Physical Therapy Time and Intention    Subjective Information complains of;weakness;fatigue  -SHARON     Document Type therapy note (daily note)  -SHARON     Mode of Treatment physical therapy  -SHARON       Row Name 07/10/25 1129          General Information    Existing Precautions/Restrictions fall  R AKA  -SHRAON       Row Name 07/10/25 1129          Pain    Pretreatment Pain Rating 0/10 - no pain  -SHARON     Posttreatment Pain Rating 0/10 - no pain  -SHARON        Row Name 07/10/25 1129          Bed Mobility    Rolling Left [place] (Bed Mobility) independent  -     Rolling Right [place] (Bed Mobility) independent  -SHARON     Supine-Sit [place] (Bed Mobility) verbal cues;minimum assist (75% patient effort)  -     Sit-Supine [place] (Bed Mobility) verbal cues;standby assist  -SHARON     Assistive Device (Bed Mobility) bed rails  -SHARON     Comment, (Bed Mobility) flat bed  -SHARON       Row Name 07/10/25 1129          Sit-Stand Transfer    Sit-Stand [place] (Transfers) verbal cues;minimum assist (75% patient effort)  -SHARON       Row Name 07/10/25 1129          Stand-Sit Transfer    Stand-Sit [place] (Transfers) verbal cues;minimum assist (75% patient effort)  -       Row Name 07/10/25 1129          Toilet Transfer    Type (Toilet Transfer) lateral;sit-stand;stand-sit  -     [place] Level (Toilet Transfer) moderate assist (50% patient effort)  -       Row Name 07/10/25 1129          Balance    Static Sitting Balance standby assist  -     Dynamic Sitting Balance standby assist  -     Position, Sitting Balance unsupported  -SHARON       Row Name 07/10/25 1129          Motor Skills    Comments, Therapeutic Exercise L LE AROM x15  -       Row Name             Residual Limb Assessment 06/24/25 1900 transfemoral (above knee), right    Residual Limb Assessment - Properties Group Placement Date: 06/24/25  - Placement Time: 1900  -JR Amputation Date: 06/24/25  -JR Location: transfemoral (above knee), right  -JR    Retired Residual Limb Assessment - Properties Group Placement Date: 06/24/25  -JR Placement Time: 1900  -JR Amputation Date: 06/24/25  -JR Location: transfemoral (above knee), right  -JR    Retired Residual Limb Assessment - Properties Group Placement Date: 06/24/25  -JR Placement Time: 1900  -JR Amputation Date: 06/24/25  -JR Location: transfemoral (above knee), right  -JR    Retired Residual Limb Assessment - Properties Group Date first  assessed: 06/24/25  -JR Time first assessed: 1900  -JR Amputation Date: 06/24/25  -JR Location: transfemoral (above knee), right  -JR      Row Name             Wound 07/05/25 2211 Right anterior knee    Wound - Properties Group Placement Date: 07/05/25  -PA Placement Time: 2211  -PA Side: Right  -PA Orientation: anterior  -PA Location: knee  -PA    Retired Wound - Properties Group Placement Date: 07/05/25  -PA Placement Time: 2211  -PA Side: Right  -PA Orientation: anterior  -PA Location: knee  -PA    Retired Wound - Properties Group Placement Date: 07/05/25  -PA Placement Time: 2211  -PA Side: Right  -PA Orientation: anterior  -PA Location: knee  -PA    Retired Wound - Properties Group Date first assessed: 07/05/25  -PA Time first assessed: 2211  -PA Side: Right  -PA Location: knee  -PA      Row Name             Wound 06/19/25 2024 Left anterior plantar    Wound - Properties Group Placement Date: 06/19/25  -BR Placement Time: 2024 -BR Side: Left  -BR Orientation: anterior  -BR Location: plantar  -BR    Retired Wound - Properties Group Placement Date: 06/19/25  -BR Placement Time: 2024 -BR Side: Left  -BR Orientation: anterior  -BR Location: plantar  -BR    Retired Wound - Properties Group Placement Date: 06/19/25  -BR Placement Time: 2024 -BR Side: Left  -BR Orientation: anterior  -BR Location: plantar  -BR    Retired Wound - Properties Group Date first assessed: 06/19/25  -BR Time first assessed: 2024  -BR Side: Left  -BR Location: plantar  -BR      Row Name             Wound 07/10/25 0800 Right posterior thigh     Wound - Properties Group Placement Date: 07/10/25  -AG Placement Time: 0800  -AG Side: Right  -AG Orientation: posterior  -AG Location: thigh  -AG Primary Wound Type: --  -AG, scab x 2     Retired Wound - Properties Group Placement Date: 07/10/25  -AG Placement Time: 0800  -AG Side: Right  -AG Orientation: posterior  -AG Location: thigh  -AG    Retired Wound - Properties Group Placement Date:  07/10/25  -AG Placement Time: 0800  -AG Side: Right  -AG Orientation: posterior  -AG Location: thigh  -AG    Retired Wound - Properties Group Date first assessed: 07/10/25  -AG Time first assessed: 0800  -AG Side: Right  -AG Location: thigh  -AG      Row Name 07/10/25 1129          Positioning and Restraints    Pre-Treatment Position in bed  -SHARON     Post Treatment Position bed  -SHARON     In Bed fowlers;call light within reach;encouraged to call for assist;notified nsg;side rails up x3;L heel elevated  -SHARON               User Key  (r) = Recorded By, (t) = Taken By, (c) = Cosigned By      Initials Name Provider Type    SHARON Tram Hollingsworth PTA Physical Therapist Assistant    Lorri Jackson RN Registered Nurse    Jeni Nathan RN Registered Nurse    Doug Brito RN Registered Nurse    Tony Schultz RN Registered Nurse                    Physical Therapy Education       Title: PT OT SLP Therapies (Done)       Topic: Physical Therapy (Done)       Point: Mobility training (Done)       Learning Progress Summary            Patient Acceptance, E, VU by KV at 7/9/2025 1930    Acceptance, E, VU by SB at 7/8/2025 1608    Comment: pt edu on POC, benefits of act and d/c plans                      Point: Home exercise program (Done)       Learning Progress Summary            Patient Acceptance, E, VU by KV at 7/9/2025 1930                      Point: Body mechanics (Done)       Learning Progress Summary            Patient Acceptance, E, VU by KV at 7/9/2025 1930                      Point: Precautions (Done)       Learning Progress Summary            Patient Acceptance, E, VU by KV at 7/9/2025 1930    Acceptance, E, VU by SB at 7/8/2025 1608    Comment: pt edu on POC, benefits of act and d/c plans                                      User Key       Initials Effective Dates Name Provider Type Discipline    SB 07/11/23 -  Raiza Cancino, PT DPT Physical Therapist PT    KV 12/31/24 -  Jamil Street, DEMETRIUS  Registered Nurse Nurse                  PT Recommendation and Plan             Time Calculation:    PT Charges       Row Name 07/10/25 1442             Time Calculation    Start Time 1129  -SHARON      Stop Time 1144  -SHARON      Time Calculation (min) 15 min  -SHARON         Timed Charges    41992 - PT Therapeutic Activity Minutes 15  -SHARON         Total Minutes    Timed Charges Total Minutes 15  -SHARON       Total Minutes 15  -SHARON                User Key  (r) = Recorded By, (t) = Taken By, (c) = Cosigned By      Initials Name Provider Type    Tram Barron, PTA Physical Therapist Assistant                  Therapy Charges for Today       Code Description Service Date Service Provider Modifiers Qty    [id number]  PT THERAPEUTIC ACT EA 15 MIN 7/10/2025 Tram Hollingsworth PTA GP 1            PT G-Codes  Outcome Measure Options: AM-PAC 6 Clicks Basic Mobility (PT)  AM-PAC 6 Clicks Score (PT): 14    Tram Hollingsworth PTA  7/10/2025

## 2025-07-10 NOTE — PROGRESS NOTES
Nutrition Services    Patient Name:  Ricardo Hugo  YOB: 1950  MRN: [id number]  Admit Date:  7/5/2025    Patient Name: Ricardo Hugo  YOB: 1950  MRN: [id number]  Admission date: 7/5/2025  Reason for Encounter: Follow-up/Progress Note    [facility] Clinical Nutrition Assessment     Subjective    Subjective Information     7/10:  Pt in bed this day; sleeping did not wake.  Was able to perform a limited NFPE that is consistent with findings from 6/30/25 performed by another RD.  Family friend and caregiver at bedside.  Pt is consuming 67% of meals when documented.  ~360 cc/day fluid at meals.  Current wt: 121#.  Pt with fluctuating wts throughout clinical course, most likely associated with recent AKA and HD encounters.  He is consuming his ONS and enjoys that per caregiver. She stated that pt has never been much of an eater and the breakfast is typically his biggest meal.  Will send double portions at breakfast.  Last BM 7/9, Carlos Eduardo Katz. UOP ~633 cc/day.      7/7:  Pt was not in his room at time of RD visit.  He is currently on a renal, cardiac diet with thin liquids. No documented intake as of yet. He does have a diagnosis of ESRD with HD encounters.  Will start patient on Novasource Renal tid to provide additional kcals/protein needed.  His current wt is 123.5#; historically, wt has fluctuated. Suspected d/t dx with HD encounters r/t fluid shifts.  He is also noted to have recently had a R AKA.  Carlos Eduardo Katz.  Last BM 7/9. UOP ~675 cc/day plus 6 unmeasured occurrences.  NFPE not performed this day; will reattempt.  Will continue to monitor and f/u as needed.       Assessment    H&P and Current Problems      H&P  Past Medical History:   Diagnosis Date    3-vessel CAD 08/11/2020    Allergic rhinitis     Anemia     Anxiety disorder 04/27/2020    Arthritis     Asymmetrical sensorineural hearing loss 06/28/2017    Atherosclerosis of native artery of both lower extremities with  intermittent claudication 07/18/2019    Avascular necrosis of femoral head, left 07/11/2020    right hip after surgery    Carotid stenosis     Chronic mucoid otitis media     Chronic rhinitis     COPD (chronic obstructive pulmonary disease)     Coronary artery disease     HEART BYPASS 2004    Crohn's disease of large intestine with other complication 07/30/2020    Chronic diarrhea Colonoscopy July 2020 revealed mild patchy scattered hemosiderin staining with inflammation more so in rectosigmoid area.  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    Iron deficiency anemia 07/14/2020    Mixed hearing loss of left ear     PAD (peripheral artery disease) 08/11/2020    Perianal abscess     Pernicious anemia 08/17/2020    took shots but never diagnosed with b12 deficiency    Personal history of alcoholism 08/11/2020    quit drinking in 2013    Prostatic hypertrophy 08/11/2020    Sensorineural hearing loss     Sepsis with acute renal failure 09/15/2020    Shortness of breath 05/27/2021    Tinnitus     Ventricular tachycardia, nonsustained 07/14/2020    Weight loss 07/11/2020      Past Surgical History:   Procedure Laterality Date    ABOVE KNEE AMPUTATION Right 6/24/2025    Procedure: right above knee amputation;  Surgeon: Blayne Zabala MD;  Location: [facility] OR;  Service: Vascular;  Laterality: Right;    AORTOGRAM Right 4/25/2025    Procedure: RIGHT LOWER EXTREMITY ANGIOGRAM, SHOCKWAVE LITHOTRIPSY, BALLOON ANGIOPLASTY, MYNX CLOSURE;  Surgeon: Gil Pineda DO;  Location:  [facility] HYBRID OR;  Service: Vascular;  Laterality: Right;    AORTOGRAM Left 5/22/2025     Procedure: LEFT LOWER EXTREMITY ANGIOGRAM, SHOCKWAVE LITHOTRIPSY, BALLOON ANGIOPLASTY, STENT PLACEMENT, MYNX CLOSURE;  Surgeon: Blayne Zabala MD;  Location: [facility] HYBRID OR;  Service: Vascular;  Laterality: Left;    AORTOGRAM Right 5/30/2025    Procedure: AORTOILIAC ANGIOGRAM WITH LEFT LOWER EXTREMITY ANGIOGRAM;  Surgeon: Gil Pineda DO;  Location: [facility] HYBRID OR;  Service: Vascular;  Laterality: Right;    AORTOGRAM Right 6/20/2025    Procedure: right lower extremity arteriogram, shockwave lithotripsy, balloon angioplasty, mynx closue;  Surgeon: Blayne Zabala MD;  Location: [facility] HYBRID OR;  Service: Vascular;  Laterality: Right;    ARTERY SURGERY  2021    right side on neck    CAROTID ENDARTERECTOMY Right 05/10/2021    Procedure: RIGHT CAROTID ENDARTERECTOMY WITH EEG;  Surgeon: Gil Pineda DO;  Location: [facility] HYBRID OR 12;  Service: Vascular;  Laterality: Right;    COLONOSCOPY N/A 07/02/2020    Procedure: COLONOSCOPY WITH ANESTHESIA;  Surgeon: Adrien Brewster MD;  Location: [facility] ENDOSCOPY;  Service: Gastroenterology;  Laterality: N/A;  pre op: diarrhea  post op: polyps  PCP: Joe Velasco MD    COLONOSCOPY N/A 10/13/2020    Procedure: COLONOSCOPY WITH ANESTHESIA;  Surgeon: Adrien Brewster MD;  Location: [facility] ENDOSCOPY;  Service: Gastroenterology;  Laterality: N/A;  Pre: Chronic Diarrhea, Crohn's  Post: AVM  Dr. Neftali Velasco  CO2 Inflation Used    COLONOSCOPY N/A 12/08/2023    Procedure: COLONOSCOPY WITH ANESTHESIA;  Surgeon: Adrien Brewster MD;  Location: [facility] ENDOSCOPY;  Service: Gastroenterology;  Laterality: N/A;  pre chrone's disease  post sub optimal prep, polyp, chrone's      CORONARY ARTERY BYPASS GRAFT  2003    x3    ENDOSCOPY N/A 11/02/2021    Procedure: ESOPHAGOGASTRODUODENOSCOPY WITH ANESTHESIA;  Surgeon: Bridger Bell MD;  Location: [facility] ENDOSCOPY;  Service: Gastroenterology;  Laterality: N/A;  pre anemia;gi bleed  post  gi bleed;schatski  "wade Velasco    ENDOSCOPY N/A 10/10/2023    Procedure: ESOPHAGOGASTRODUODENOSCOPY WITH ANESTHESIA;  Surgeon: Adrien Brewster MD;  Location: [facility] ENDOSCOPY;  Service: Gastroenterology;  Laterality: N/A;  preop; anemia  postop esophagitis ; R/O barretts   PCP Randall Beata    EYE SURGERY Bilateral     catorac    INCISION AND DRAINAGE PERIRECTAL ABSCESS N/A 03/03/2017    Procedure: INCISION AND DRAINAGE OF JEET ANAL ABSCESS;  Surgeon: Lynette Smith MD;  Location: [facility] OR;  Service:     INGUINAL HERNIA REPAIR Bilateral 06/27/2023    Procedure: INGUINAL HERNIA BILATERAL REPAIR LAPAROSCOPIC WITH DAVINCI ROBOT WITH MESH;  Surgeon: Tahira Rivera MD;  Location:  [facility] OR;  Service: Robotics - DaVinci;  Laterality: Bilateral;    INSERTION HEMODIALYSIS CATHETER N/A 4/16/2025    Procedure: HEMODIALYSIS CATHETER PLACEMENT;  Surgeon: Gil Pineda DO;  Location: [facility] HYBRID OR;  Service: Vascular;  Laterality: N/A;    MYRINGOTOMY W/ TUBES Left 04/17/2017    06/10/2016    TONSILLECTOMY      TOTAL HIP ARTHROPLASTY Right 2006      Current Problems   Admission Diagnosis:  Sepsis [A41.9]    Problem List:    Sepsis    Essential hypertension    Peripheral arterial disease    IHD (ischemic heart disease)    Anemia due to chronic kidney disease    Chronic diastolic (congestive) heart failure    ESRD (end stage renal disease) on dialysis    Elevated troponin    Coronary artery disease involving native coronary artery of native heart without angina pectoris       Anthropometrics      BMI, Height, Weight Estimated body mass index is 15.65 kg/m² as calculated from the following:    Height as of this encounter: 182.9 cm (72\").    Weight as of this encounter: 52.3 kg (115 lb 6.4 oz).    Weight Method: Bed scale       Trending Weight Changes Unknown/Unable to Determine       Weight History  Wt Readings from Last 20 Encounters:   07/10/25 0402 52.3 kg (115 lb 6.4 oz)   07/09/25 0413 53.8 kg (118 lb 11.2 oz) "   07/08/25 0330 50.3 kg (111 lb)   07/07/25 1541 55.8 kg (123 lb)   07/07/25 0600 56 kg (123 lb 7.3 oz)   07/05/25 2123 55 kg (121 lb 3.2 oz)   07/05/25 1647 49.9 kg (110 lb)   07/03/25 0514 36.7 kg (81 lb)   07/02/25 0457 39.6 kg (87 lb 6.4 oz)   07/01/25 0509 35.5 kg (78 lb 4.8 oz)   06/30/25 0325 40.1 kg (88 lb 8 oz)   06/29/25 0500 36.8 kg (81 lb 1.6 oz)   06/28/25 0600 39.9 kg (88 lb)   06/26/25 0608 39.7 kg (87 lb 9.6 oz)   06/25/25 0524 43.2 kg (95 lb 4.8 oz)   06/24/25 0531 42.3 kg (93 lb 3.2 oz)   06/20/25 0520 60.5 kg (133 lb 4.8 oz)   06/19/25 1844 62.5 kg (137 lb 11.2 oz)   06/19/25 1601 61.8 kg (136 lb 3.2 oz)   06/05/25 0626 58.7 kg (129 lb 6.4 oz)   06/04/25 0600 60.7 kg (133 lb 13.1 oz)   06/03/25 0418 59.5 kg (131 lb 3.2 oz)   05/31/25 2325 61.6 kg (135 lb 14.4 oz)   05/31/25 0302 61.1 kg (134 lb 11.2 oz)   05/30/25 0323 62.3 kg (137 lb 5.6 oz)   05/29/25 0325 59.8 kg (131 lb 13.4 oz)   05/28/25 0500 61.7 kg (136 lb 0.4 oz)   05/27/25 0703 62.1 kg (136 lb 14.5 oz)   05/25/25 0700 65.3 kg (143 lb 15.4 oz)   05/24/25 0315 63.1 kg (139 lb 1.8 oz)   05/23/25 0500 64.6 kg (142 lb 6.4 oz)   05/22/25 0600 62 kg (136 lb 11.2 oz)   05/21/25 1954 62 kg (136 lb 11.2 oz)   05/21/25 1527 61.7 kg (136 lb 0.4 oz)   05/19/25 1422 61.7 kg (136 lb)   05/13/25 0842 66.2 kg (146 lb)   04/28/25 0942 66.7 kg (147 lb)   04/28/25 0901 67.4 kg (148 lb 9.6 oz)   04/26/25 0525 60.1 kg (132 lb 9.6 oz)   04/24/25 0634 60.1 kg (132 lb 9.6 oz)   04/23/25 0600 60.9 kg (134 lb 3.2 oz)   04/22/25 0453 61.6 kg (135 lb 14.4 oz)   04/21/25 0501 67.5 kg (148 lb 12.8 oz)   04/20/25 0750 60.3 kg (133 lb)   04/16/25 0629 65.7 kg (144 lb 13.5 oz)   04/14/25 1438 61.7 kg (136 lb)   04/13/25 1946 64.6 kg (142 lb 6.4 oz)   04/11/25 1429 66.2 kg (146 lb)   04/09/25 1333 65.3 kg (144 lb)   04/07/25 1415 65.3 kg (144 lb)   04/02/25 0854 68.9 kg (152 lb)   03/24/25 0929 69 kg (152 lb 3.2 oz)   03/17/25 0830 66.7 kg (147 lb)   03/10/25 0841 64.3 kg  (141 lb 12.8 oz)   03/03/25 1113 65.1 kg (143 lb 9.6 oz)   02/27/25 1021 65.3 kg (144 lb)            Labs        Results from last 7 days   Lab Units 07/10/25  0439 07/09/25 0326 07/08/25  0506 07/07/25  0410 07/06/25  0537 07/05/25  1758   SODIUM mmol/L 137 135* 135* 134* 132*  --    POTASSIUM mmol/L 3.6 3.9 3.6 3.9 3.7  --    GLUCOSE mg/dL 88 101* 115* 96 94  --    BUN mg/dL 26.8* 43.0* 27.3* 42.9* 33.9*  --    CREATININE mg/dL 2.00* 2.88* 2.32* 3.10* 2.47*  --    CALCIUM mg/dL 9.0 9.1 8.9 9.0 8.5*  --    PHOSPHORUS mg/dL  --   --   --   --  2.4*  --    MAGNESIUM mg/dL  --   --   --   --  1.9  --    ALBUMIN g/dL  --  2.6*  --  2.7* 2.9*  --    LACTATE mmol/L  --   --   --   --   --  2.0   BILIRUBIN mg/dL  --  0.2  --  0.2 0.4  --    ALK PHOS U/L  --  287*  --  268* 312*  --    AST (SGOT) U/L  --  18  --  23 51*  --    ALT (SGPT) U/L  --  20  --  29 48*  --      Results from last 7 days   Lab Units 07/10/25  0439 07/09/25 0326 07/07/25  0410 07/06/25  0537   PLATELETS 10*3/mm3  --  148 157 159   HEMOGLOBIN g/dL 8.6* 7.9* 8.6* 8.7*   HEMATOCRIT % 28.3* 25.5* 27.0* 27.4*     Lab Results   Component Value Date    HGBA1C 4.90 06/30/2025          Medications       Scheduled Medications aspirin, 81 mg, Oral, Daily  atorvastatin, 10 mg, Oral, Daily  budesonide-formoterol, 2 puff, Inhalation, BID - RT   And  ipratropium, 0.5 mg, Nebulization, TID - RT  calcitriol, 0.5 mcg, Oral, Daily  carvedilol, 6.25 mg, Oral, BID With Meals  cefepime, 2,000 mg, Intravenous, Q24H  clopidogrel, 75 mg, Oral, Daily  empagliflozin, 10 mg, Oral, Daily  epoetin cecile/cecile-epbx, 10,000 Units, Subcutaneous, Once per day on Monday Wednesday Friday  gabapentin, 300 mg, Oral, Nightly  heparin (porcine), 5,000 Units, Subcutaneous, Q12H  hydrALAZINE, 50 mg, Oral, TID  iron polysaccharides, 150 mg, Oral, Daily  isosorbide dinitrate, 10 mg, Oral, TID - Nitrates  levothyroxine, 100 mcg, Oral, Q AM  montelukast, 10 mg, Oral, Nightly  NIFEdipine XL, 90  "mg, Oral, Daily  pantoprazole, 40 mg, Oral, Q AM  senna-docusate sodium, 2 tablet, Oral, BID  sodium chloride, 10 mL, Intravenous, Q12H  tamsulosin, 0.4 mg, Oral, Nightly  valsartan, 320 mg, Oral, Daily        Infusions      PRN Medications   acetaminophen    albuterol    senna-docusate sodium **AND** polyethylene glycol **AND** bisacodyl **AND** bisacodyl    fluticasone    heparin (porcine)    oxyCODONE-acetaminophen    sodium chloride    sodium chloride     Physical Findings      Chewing/Swallowing  Teeth Status: Mouth/Teeth WDL: .WDL except, teeth Teeth Symptoms: dental appliance present  Chewing/Swallowing Issues: No issues identified at this time   Edema              Leg, Right Edema: 1+ (Trace) (\"stump\")             Bowel Function  Stool Output  Stool Unmeasured Occurrence: 1 (07/10/25 1131)  Bowel Incontinence: Yes (07/10/25 1131)  Stool Amount: Small (07/10/25 1131)  Stool Consistency: formed (07/09/25 1525)  Perineal Care: absorbent brief/pad changed, perineum cleansed (07/09/25 1455)  Last Bowel Movement: 07/09/25   I/Os  Intake & Output (last 3 days)         07/07 0701 07/08 0700 07/08 0701 07/09 0700 07/09 0701  07/10 0700 07/10 0701  07/11 0700    P.O.   240 480    Total Intake(mL/kg)   240 (4.6) 480 (9.2)    Urine (mL/kg/hr) 400 (0.3) 800 (0.6) 700 (0.6) 100 (0.3)    Stool   0 0    Dialysis   2000     Total Output  100    Net -400 -800 -2460 +380            Stool Unmeasured Occurrence  1 x 2 x 1 x           Lines, Drains, Airways, & Wounds       Active LDAs       Name Placement date Placement time Site Days Last dressing change    Peripheral IV 07/05/25 1759 20 G Left;Posterior Forearm 07/05/25  1759  Forearm  1 07/06/25 2000 (15.95 hrs)    External Urinary Catheter 07/06/25  0641  --  1     Hemodialysis Cath Double 04/16/25 0913  Internal Jugular  82     Wound 06/19/25 2024 Left anterior plantar 06/19/25 2024  -- 17     Wound 07/05/25 2211 Right anterior knee 07/05/25 2211  -- 1       "                 Nutrition Focused Physical Exam     Trending NFPE Performed 7/10; consistent with previous encounters.     Malnutrition Severity Assessment      Patient meets criteria for : Severe Malnutrition (In the context of chronic disease.)  Malnutrition Type (Last 8 Hours)       Malnutrition Severity Assessment       Row Name 07/10/25 4449       Malnutrition Severity Assessment    Malnutrition Type Chronic Disease - Related Malnutrition      Row Name 07/10/25 1358       Muscle Loss    Loss of Muscle Mass Findings Severe    Orthodoxy Region Severe - deep hollowing/scooping, lack of muscle to touch, facial bones well defined    Clavicle Bone Region Severe - protruding prominent bone    Acromion Bone Region Severe - squared shoulders, bones, and acromion process protrusion prominent    Scapular Bone Region Severe - prominent bones, depressions easily visible between ribs, scapula, spine, shoulders    Dorsal Hand Region Severe - prominent depression    Patellar Region Severe - prominent bone, square looking, very little muscle definition    Anterior Thigh Region Severe - line/depression along thigh, obviously thin    Posterior Calf Region Severe - thin with very little definition/firmness      Row Name 07/10/25 1358       Fat Loss    Subcutaneous Fat Loss Findings Severe    Orbital Region  Severe - pronounced hollowness/depression, dark circles, loose saggy skin    Upper Arm Region Severe - mostly skin, very little space between folds, fingers touch    Thoracic & Lumbar Region Severe - ribs visible with prominent depressions, iliac crest very prominent      Row Name 07/10/25 6144       Criteria Met (Must meet criteria for severity in at least 2 of these categories: M Wasting, Fat Loss, Fluid, Secondary Signs, Wt. Status, Intake)    Patient meets criteria for  Severe Malnutrition  In the context of chronic disease.                     Estimated Needs      Energy Requirements    Weight for Calculation 56kg   Method  for Estimation  30-35 kcals/kg   Daily Needs (kcal/day) 1066-1203 kcals/day   Protein Requirements    Weight for Calculation 178#/~81kg (based on IBW)   Method for Estimation 1.0-1.2 gm/kg   Daily Needs (g/day) 81-97 gms/day   Fluid Requirements     Method for Estimation 1000 mL + urine Output    Daily Needs (mL/day) Changes based on UOP     Current Nutrition Orders & Evaluation of Intake      Oral Nutrition     Food Allergies  and Intolerances NKFA   Current PO Diet Diet: Renal, Cardiac; Low Sodium (2g); Low Sodium (2-3g), Low Potassium, Low Phosphorus; Texture: Regular (IDDSI 7); Fluid Consistency: Thin (IDDSI 0)   Oral Nutrition Supplement None  Novasource Renal tid;  Novasource Renal TID (provides 1425 kcals, 65 g protein if consumed)      Trending % PO Intake No documented intake as of now.     Enteral Nutrition     Current EN Order Patient isn't on Tube Feeding  Patient doesn't have any tube feeding modular orders     EN Route      EN Tolerance     EN Observation/Intake         Parenteral Nutrition     Current TPN Order    TPN Route    Lipids (mL/%/frequency)     Total # Days on TPN    TPN Observation/Intake       Assessment & Plan   Nutrition Diagnosis and Goals       Nutrition Diagnosis 1 Increased Nutrient Needs (pro/kcals) r/t ESRD with HD encounters AEB BMI 15.65.            Goal(s) Increase PO Intake , Meets Estimated Needs , Accepts Oral Nutrition Supplement, Tolerates PO Diet , No Significant Weight Loss, Goals of Care are Established, and Skin Integrity to Improve     Nutrition Intervention and Prescription       Intervention  Advised alternate menu selections, Continue to monitor for plan of care, Continue with current interventions, Completed NFPE, and double portions at breakfast.      Diet Prescription Double portions at breakfast.    Supplement Prescription     Education Provided  No needs identified at his time.        Enteral Prescription        TPN Prescription      Monitoring/Evaluation        Monitor/Evaluation Per Protocol     RD Follow-Up Encounter 3-5 days     Electronically signed by:  Jeffry Morgan RD  07/10/25 14:02 CDT      Electronically signed by:  Jeffry Morgan RD  07/10/25 14:02 CDT

## 2025-07-10 NOTE — PROGRESS NOTES
Nephrology ([facility]) Progress Note      Patient:  Ricardo Hugo  YOB: 1950  Date of Service: 7/10/2025  MRN: [id number]   Acct: [id number]   Primary Care Physician: Nathan Zimmer MD  Advance Directive:   Code Status and Medical Interventions: CPR (Attempt to Resuscitate); Full Support   Ordered at: 07/05/25 2124     Code Status (Patient has no pulse and is not breathing):    CPR (Attempt to Resuscitate)     Medical Interventions (Patient has pulse or is breathing):    Full Support     Level Of Support Discussed With:    Patient     Admit Date: 7/5/2025       Hospital Day: 5  Referring Provider: No Known Provider      Patient personally seen and examined.  Complete chart including Consults, Notes, Operative Reports, Labs, Cardiology, and Radiology studies reviewed as able.        Subjective:  Ricardo Hugo is a 75 y.o. male for whom we were consulted for evaluation and treatment of end stage renal disease on hemodialysis Monday Wednesday Friday. History of diastolic CHF, CAD with prior CABG, hypertension. Recent admission to [facility] and had right AKA. Discharged to rehab facility on 7/03.   Returned to ER on 7/05 due to findings of confusion and fever of 105 while at outpatient dialysis clinic. Denied cough, abdominal pain, dysuria. Some confusion on initial nephrology exam. Tolerated dialysis well on 7/07 and with subsequent treatments    Today is awake and alert. No new complaints or overnight issues.     Allergies:  Ondansetron, Zofran [ondansetron hcl], Lortab [hydrocodone-acetaminophen], and Allopurinol    Home Meds:  Medications Prior to Admission   Medication Sig Dispense Refill Last Dose/Taking    ALPRAZolam (XANAX) 0.5 MG tablet Take 1 tablet by mouth At Night As Needed for Anxiety or Sleep.   Past Month Bedtime    aspirin 81 MG chewable tablet Chew 1 tablet Daily.   Taking    atorvastatin (LIPITOR) 10 MG tablet Take 1 tablet by mouth Daily. 90 tablet 3  Taking    Budeson-Glycopyrrol-Formoterol (Breztri Aerosphere) 160-9-4.8 MCG/ACT aerosol inhaler Inhale 2 puffs 2 (Two) Times a Day.   Taking    calcitriol (ROCALTROL) 0.5 MCG capsule Take 1 capsule by mouth Daily. 90 capsule 2 Taking    carvedilol (COREG) 12.5 MG tablet Take 1 tablet by mouth 2 (Two) Times a Day With Meals.   Taking    clopidogrel (PLAVIX) 75 MG tablet Take 1 tablet by mouth Daily.   Taking    empagliflozin (Jardiance) 10 MG tablet tablet Take 1 tablet by mouth Daily.   Taking    esomeprazole (nexIUM) 20 MG capsule Take 1 capsule by mouth Every Morning Before Breakfast.   Taking    gabapentin (NEURONTIN) 300 MG capsule Take 1 capsule by mouth Every Night for 24 doses.   Taking    hydrALAZINE (APRESOLINE) 100 MG tablet Take 0.5 tablets by mouth 3 (Three) Times a Day.   Taking    iron polysaccharides (NIFEREX) 150 MG capsule Take 1 capsule by mouth Daily.   Taking    isosorbide dinitrate (ISORDIL) 10 MG tablet Take 1 tablet by mouth 3 (Three) Times a Day. 270 tablet 2 Taking    levothyroxine (Synthroid) 100 MCG tablet Take 1 tablet by mouth Every Morning. 90 tablet 2 Taking    magnesium chloride-calcium (SLOW MAGNESIUM/CALCIUM)  MG tablet delayed-release ec tablet Take 1 tablet by mouth Daily.   Taking    montelukast (SINGULAIR) 10 MG tablet Take 1 tablet by mouth Every Night.   Taking    Multiple Vitamins-Minerals (PRESERVISION/LUTEIN) capsule Take 1 capsule by mouth 2 (two) times a day.   Taking    NIFEdipine XL (ADALAT CC) 60 MG 24 hr tablet Take 2 tablets by mouth Daily.   Taking    oxyCODONE-acetaminophen (PERCOCET) 5-325 MG per tablet Take 1 tablet by mouth Every 6 (Six) Hours As Needed for Moderate Pain for up to 24 doses. 24 tablet 0 7/5/2025    polyethylene glycol (MIRALAX) 17 g packet Take 17 g by mouth Daily. For constipation   Taking    tamsulosin (FLOMAX) 0.4 MG capsule 24 hr capsule Take 1 capsule by mouth Every Night.   Taking    valsartan (DIOVAN) 320 MG tablet Take 1 tablet by  mouth Daily.   Taking    vitamin D (ERGOCALCIFEROL) 1.25 MG (34382 UT) capsule capsule Take 1 capsule by mouth 1 (One) Time Per Week. Mondays   Taking    acetaminophen (TYLENOL) 325 MG tablet Take 2 tablets by mouth Every 4 (Four) Hours As Needed for Mild Pain.   Unknown    albuterol sulfate  (90 Base) MCG/ACT inhaler Inhale 2 puffs Every 6 (Six) Hours As Needed for Wheezing or Shortness of Air.   Unknown    bisacodyl (DULCOLAX) 10 MG suppository Insert 1 suppository into the rectum Daily As Needed for Constipation.   Unknown    desoximetasone (TOPICORT) 0.25 % cream Apply 1 Application topically to the appropriate area as directed 2 (Two) Times a Day As Needed for Irritation. irritation 60 g 0     docusate sodium (COLACE) 100 MG capsule Take 1 capsule by mouth 3 (Three) Times a Day As Needed for Constipation.   Unknown    fluticasone (FLONASE) 50 MCG/ACT nasal spray Administer 2 sprays into the nostril(s) as directed by provider Daily As Needed for Allergies.   Unknown    naloxone (NARCAN) 4 MG/0.1ML nasal spray Call 911. Don't prime. Waves in 1 nostril for overdose. Repeat in 2-3 minutes in other nostril if no or minimal breathing/responsiveness. 2 each 0 Unknown    senna 8.6 MG tablet Take 2 tablets by mouth Daily As Needed for Constipation.   Unknown       Medicines:  Current Facility-Administered Medications   Medication Dose Route Frequency Provider Last Rate Last Admin    acetaminophen (TYLENOL) tablet 650 mg  650 mg Oral Q4H PRN Dc Issa DO   650 mg at 07/09/25 0945    albuterol (ACCUNEB) nebulizer solution 0.63 mg  0.63 mg Nebulization Q6H PRN Vidal Miramontes MD        aspirin EC tablet 81 mg  81 mg Oral Daily Dc Issa DO   81 mg at 07/10/25 0826    atorvastatin (LIPITOR) tablet 10 mg  10 mg Oral Daily Dc Issa DO   10 mg at 07/10/25 0827    sennosides-docusate (PERICOLACE) 8.6-50 MG per tablet 2 tablet  2 tablet Oral BID Dc Issa DO   2 tablet at 07/09/25 1218     And    polyethylene glycol (MIRALAX) packet 17 g  17 g Oral Daily PRN Dc sIsa DO        And    bisacodyl (DULCOLAX) EC tablet 5 mg  5 mg Oral Daily PRN Dc Issa DO        And    bisacodyl (DULCOLAX) suppository 10 mg  10 mg Rectal Daily PRN Dc Issa DO        budesonide-formoterol (SYMBICORT) 160-4.5 MCG/ACT inhaler 2 puff  2 puff Inhalation BID - RT Ramon Aguilera MD   2 puff at 07/10/25 0611    And    ipratropium (ATROVENT) nebulizer solution 0.5 mg  0.5 mg Nebulization TID - RT Ramon Aguilera MD   0.5 mg at 07/10/25 0605    calcitriol (ROCALTROL) capsule 0.5 mcg  0.5 mcg Oral Daily Dc Issa DO   0.5 mcg at 07/10/25 0826    carvedilol (COREG) tablet 6.25 mg  6.25 mg Oral BID With Meals Dc Issa DO   6.25 mg at 07/10/25 0826    cefepime 2000 mg IVPB in 100 mL NS (MBP)  2,000 mg Intravenous Q24H Ramon Aguilera MD        clopidogrel (PLAVIX) tablet 75 mg  75 mg Oral Daily Dc Issa DO   75 mg at 07/10/25 0826    empagliflozin (JARDIANCE) tablet 10 mg  10 mg Oral Daily Vidal Miramontes MD   10 mg at 07/10/25 0826    epoetin cecile-epbx (RETACRIT) injection 10,000 Units  10,000 Units Subcutaneous Once per day on Monday Wednesday Friday Giuliano Saldana MD   10,000 Units at 07/09/25 1224    fluticasone (FLONASE) 50 MCG/ACT nasal spray 2 spray  2 spray Nasal Daily PRN Vidal Miramontes MD        gabapentin (NEURONTIN) capsule 300 mg  300 mg Oral Nightly Dc Issa DO   300 mg at 07/09/25 2109    heparin (porcine) 5000 UNIT/ML injection 5,000 Units  5,000 Units Subcutaneous Q12H Dc Issa DO   5,000 Units at 07/10/25 0826    heparin (porcine) injection 3,200 Units  3,200 Units Intracatheter PRN Twan Quiroz MD   3,200 Units at 07/07/25 1201    hydrALAZINE (APRESOLINE) tablet 50 mg  50 mg Oral TID Ramon Aguilera MD   50 mg at 07/10/25 0825    iron polysaccharides (NIFEREX) capsule 150 mg  150 mg Oral Daily Dc Issa, DO   150  mg at 07/10/25 0826    isosorbide dinitrate (ISORDIL) tablet 10 mg  10 mg Oral TID - Nitrates Dc Issa DO   10 mg at 07/10/25 0827    levothyroxine (SYNTHROID, LEVOTHROID) tablet 100 mcg  100 mcg Oral Q AM Dc Issa DO   100 mcg at 07/10/25 0605    montelukast (SINGULAIR) tablet 10 mg  10 mg Oral Nightly Dc Issa DO   10 mg at 07/09/25 2109    NIFEdipine XL (PROCARDIA XL) 24 hr tablet 90 mg  90 mg Oral Daily Ramon Aguilera MD   90 mg at 07/10/25 0826    oxyCODONE-acetaminophen (PERCOCET) 5-325 MG per tablet 1 tablet  1 tablet Oral Q6H PRN Dc Issa DO   1 tablet at 07/10/25 0605    pantoprazole (PROTONIX) EC tablet 40 mg  40 mg Oral Q AM Dc Issa DO   40 mg at 07/10/25 0605    sodium chloride 0.9 % flush 10 mL  10 mL Intravenous Q12H Dc Issa DO   10 mL at 07/10/25 0827    sodium chloride 0.9 % flush 10 mL  10 mL Intravenous PRN Dc Issa DO        sodium chloride 0.9 % infusion 40 mL  40 mL Intravenous PRN Dc Issa DO        tamsulosin (FLOMAX) 24 hr capsule 0.4 mg  0.4 mg Oral Nightly Vidal Miramontes MD   0.4 mg at 07/09/25 2109    valsartan (DIOVAN) tablet 320 mg  320 mg Oral Daily Vidal Miramontes MD   320 mg at 07/10/25 0825       Past Medical History:  Past Medical History:   Diagnosis Date    3-vessel CAD 08/11/2020    Allergic rhinitis     Anemia     Anxiety disorder 04/27/2020    Arthritis     Asymmetrical sensorineural hearing loss 06/28/2017    Atherosclerosis of native artery of both lower extremities with intermittent claudication 07/18/2019    Avascular necrosis of femoral head, left 07/11/2020    right hip after surgery    Carotid stenosis     Chronic mucoid otitis media     Chronic rhinitis     COPD (chronic obstructive pulmonary disease)     Coronary artery disease     HEART BYPASS 2004    Crohn's disease of large intestine with other complication 07/30/2020    Chronic diarrhea Colonoscopy July 2020 revealed mild patchy  scattered hemosiderin staining with inflammation more so in rectosigmoid area.  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    Iron deficiency anemia 07/14/2020    Mixed hearing loss of left ear     PAD (peripheral artery disease) 08/11/2020    Perianal abscess     Pernicious anemia 08/17/2020    took shots but never diagnosed with b12 deficiency    Personal history of alcoholism 08/11/2020    quit drinking in 2013    Prostatic hypertrophy 08/11/2020    Sensorineural hearing loss     Sepsis with acute renal failure 09/15/2020    Shortness of breath 05/27/2021    Tinnitus     Ventricular tachycardia, nonsustained 07/14/2020    Weight loss 07/11/2020       Past Surgical History:  Past Surgical History:   Procedure Laterality Date    ABOVE KNEE AMPUTATION Right 6/24/2025    Procedure: right above knee amputation;  Surgeon: Blayne Zabala MD;  Location:  [facility] OR;  Service: Vascular;  Laterality: Right;    AORTOGRAM Right 4/25/2025    Procedure: RIGHT LOWER EXTREMITY ANGIOGRAM, SHOCKWAVE LITHOTRIPSY, BALLOON ANGIOPLASTY, MYNX CLOSURE;  Surgeon: Gil Pineda DO;  Location:  [facility] HYBRID OR;  Service: Vascular;  Laterality: Right;    AORTOGRAM Left 5/22/2025    Procedure: LEFT LOWER EXTREMITY ANGIOGRAM, SHOCKWAVE LITHOTRIPSY, BALLOON ANGIOPLASTY, STENT PLACEMENT, MYNX CLOSURE;  Surgeon: Blayne Zabala MD;  Location:  [facility]BRID OR;  Service: Vascular;  Laterality: Left;    AORTOGRAM Right 5/30/2025    Procedure: AORTOILIAC ANGIOGRAM WITH LEFT LOWER EXTREMITY ANGIOGRAM;  Surgeon: Gil Pineda DO;  Location:  [facility] HYBRID OR;  Service: Vascular;   Laterality: Right;    AORTOGRAM Right 6/20/2025    Procedure: right lower extremity arteriogram, shockwave lithotripsy, balloon angioplasty, mynx closue;  Surgeon: Blayne Zabala MD;  Location: [facility] HYBRID OR;  Service: Vascular;  Laterality: Right;    ARTERY SURGERY  2021    right side on neck    CAROTID ENDARTERECTOMY Right 05/10/2021    Procedure: RIGHT CAROTID ENDARTERECTOMY WITH EEG;  Surgeon: Gil Pineda DO;  Location: [facility] HYBRID OR 12;  Service: Vascular;  Laterality: Right;    COLONOSCOPY N/A 07/02/2020    Procedure: COLONOSCOPY WITH ANESTHESIA;  Surgeon: Adrien Brewster MD;  Location: [facility] ENDOSCOPY;  Service: Gastroenterology;  Laterality: N/A;  pre op: diarrhea  post op: polyps  PCP: Joe Velasco MD    COLONOSCOPY N/A 10/13/2020    Procedure: COLONOSCOPY WITH ANESTHESIA;  Surgeon: Adrien Brewster MD;  Location: [facility] ENDOSCOPY;  Service: Gastroenterology;  Laterality: N/A;  Pre: Chronic Diarrhea, Crohn's  Post: AVM  Dr. Neftali Velasco  CO2 Inflation Used    COLONOSCOPY N/A 12/08/2023    Procedure: COLONOSCOPY WITH ANESTHESIA;  Surgeon: Adrien Brewster MD;  Location: [facility] ENDOSCOPY;  Service: Gastroenterology;  Laterality: N/A;  pre chrone's disease  post sub optimal prep, polyp, chrone's      CORONARY ARTERY BYPASS GRAFT  2003    x3    ENDOSCOPY N/A 11/02/2021    Procedure: ESOPHAGOGASTRODUODENOSCOPY WITH ANESTHESIA;  Surgeon: Bridger Bell MD;  Location: [facility] ENDOSCOPY;  Service: Gastroenterology;  Laterality: N/A;  pre anemia;gi bleed  post  gi bleed;schatski ring  Dr. ERIC Velasco    ENDOSCOPY N/A 10/10/2023    Procedure: ESOPHAGOGASTRODUODENOSCOPY WITH ANESTHESIA;  Surgeon: Adrien Brewster MD;  Location: [facility] ENDOSCOPY;  Service: Gastroenterology;  Laterality: N/A;  preop; anemia  postop esophagitis ; R/O barretts   PCP Randall Beata    EYE SURGERY Bilateral     catorac    INCISION AND DRAINAGE PERIRECTAL ABSCESS N/A 03/03/2017    Procedure: INCISION  AND DRAINAGE OF JEET ANAL ABSCESS;  Surgeon: Lynette Smith MD;  Location:  [facility] OR;  Service:     INGUINAL HERNIA REPAIR Bilateral 2023    Procedure: INGUINAL HERNIA BILATERAL REPAIR LAPAROSCOPIC WITH DAVINCI ROBOT WITH MESH;  Surgeon: Tahira Rivera MD;  Location:  [facility] OR;  Service: Robotics - DaVinci;  Laterality: Bilateral;    INSERTION HEMODIALYSIS CATHETER N/A 2025    Procedure: HEMODIALYSIS CATHETER PLACEMENT;  Surgeon: Gil Pineda DO;  Location:  [facility] HYBRID OR;  Service: Vascular;  Laterality: N/A;    MYRINGOTOMY W/ TUBES Left 2017    06/10/2016    TONSILLECTOMY      TOTAL HIP ARTHROPLASTY Right        Family History  Family History   Problem Relation Age of Onset    Breast cancer Mother     Dementia Father     Glaucoma Father     No Known Problems Daughter     Colon polyps Neg Hx     Colon cancer Neg Hx        Social History  Social History     Socioeconomic History    Marital status:    Tobacco Use    Smoking status: Former     Current packs/day: 0.00     Average packs/day: 0.5 packs/day for 25.8 years (12.9 ttl pk-yrs)     Types: Cigarettes     Start date:      Quit date: 10/13/2013     Years since quittin.7     Passive exposure: Past    Smokeless tobacco: Never    Tobacco comments:     quit 2013   Vaping Use    Vaping status: Former    Substances: Nicotine    Devices: Pre-filled or refillable cartridge   Substance and Sexual Activity    Alcohol use: Not Currently    Drug use: No    Sexual activity: Not Currently     Partners: Female       Review of Systems:  History obtained from chart review and the patient  General ROS: No fever or chills  Respiratory ROS: No cough, shortness of breath, wheezing  Cardiovascular ROS: No chest pain or palpitations  Gastrointestinal ROS: No abdominal pain or melena  Genito-Urinary ROS: No dysuria or hematuria  Psych ROS: No anxiety and depression  14 point ROS reviewed with the patient and negative except as noted  above and in the HPI unless unable to obtain.    Objective:  Patient Vitals for the past 24 hrs:   BP Temp Temp src Pulse Resp SpO2 Weight   07/10/25 0740 157/50 98.2 °F (36.8 °C) Oral 63 14 97 % --   07/10/25 0615 -- -- -- 82 16 98 % --   07/10/25 0611 -- -- -- 76 16 97 % --   07/10/25 0605 -- -- -- 72 18 99 % --   07/10/25 0402 -- -- -- -- -- -- 52.3 kg (115 lb 6.4 oz)   07/10/25 0317 165/60 99 °F (37.2 °C) Oral 59 18 99 % --   07/09/25 2013 -- -- -- 72 16 97 % --   07/09/25 2010 -- -- -- 70 16 97 % --   07/09/25 2001 157/72 98.5 °F (36.9 °C) Oral 72 16 97 % --   07/09/25 1525 163/52 97.8 °F (36.6 °C) Oral 73 16 98 % --   07/09/25 1257 90/61 -- -- 68 -- -- --   07/09/25 1120 170/57 97.5 °F (36.4 °C) Oral 77 16 100 % --       Intake/Output Summary (Last 24 hours) at 7/10/2025 0910  Last data filed at 7/10/2025 0624  Gross per 24 hour   Intake 240 ml   Output 2700 ml   Net -2460 ml     General: awake/alert   Chest:  clear to auscultation bilaterally without respiratory distress  CVS: regular rate and rhythm  Abdominal: soft, nontender, positive bowel sounds  Extremities: right AKA and no cyanosis or edema  Skin: warm and dry without rash      Labs:  Results from last 7 days   Lab Units 07/10/25  0439 07/09/25  0326 07/07/25  0410 07/06/25  0537   WBC 10*3/mm3  --  9.98 16.96* 22.70*   HEMOGLOBIN g/dL 8.6* 7.9* 8.6* 8.7*   HEMATOCRIT % 28.3* 25.5* 27.0* 27.4*   PLATELETS 10*3/mm3  --  148 157 159         Results from last 7 days   Lab Units 07/10/25  0439 07/09/25  0326 07/08/25  0506 07/07/25  0410 07/06/25  0537   SODIUM mmol/L 137 135* 135* 134* 132*   POTASSIUM mmol/L 3.6 3.9 3.6 3.9 3.7   CHLORIDE mmol/L 100 98 97* 100 97*   CO2 mmol/L 25.0 25.0 26.0 22.0 25.0   BUN mg/dL 26.8* 43.0* 27.3* 42.9* 33.9*   CREATININE mg/dL 2.00* 2.88* 2.32* 3.10* 2.47*   CALCIUM mg/dL 9.0 9.1 8.9 9.0 8.5*   EGFR mL/min/1.73 33.7* 21.8* 28.2* 19.9* 26.2*   BILIRUBIN mg/dL  --  0.2  --  0.2 0.4   ALK PHOS U/L  --  287*  --  268* 312*    ALT (SGPT) U/L  --  20  --  29 48*   AST (SGOT) U/L  --  18  --  23 51*   GLUCOSE mg/dL 88 101* 115* 96 94       Radiology:   Imaging Results (Last 72 Hours)       ** No results found for the last 72 hours. **            Culture:  Blood Culture   Date Value Ref Range Status   07/05/2025 No growth at 24 hours  Preliminary   07/05/2025 No growth at 24 hours  Preliminary     Urine Culture   Date Value Ref Range Status   07/05/2025 Culture in progress  Preliminary         Assessment    End stage renal disease on HD MWF  Hypertension  Recent right AKA  Sepsis   Anemia of CKD  Chronic diastolic CHF  Hyponatremia--improved    Plan:   Dialysis next due 7/11  Monitor labs      Slava Tate, APRN  7/10/2025  09:10 CDT

## 2025-07-10 NOTE — PLAN OF CARE
Goal Outcome Evaluation:      Patient rolls in bed independently. Patient required min assist for supine to sit in a flat bed. Patient stood with Min assist. Pivot transferred to bedside commode with MOD assist to and from bed. Sba for sit to supine. Patient tolerated activity well.

## 2025-07-11 VITALS
SYSTOLIC BLOOD PRESSURE: 104 MMHG | OXYGEN SATURATION: 98 % | HEIGHT: 72 IN | RESPIRATION RATE: 16 BRPM | TEMPERATURE: 98.7 F | BODY MASS INDEX: 16.16 KG/M2 | DIASTOLIC BLOOD PRESSURE: 43 MMHG | HEART RATE: 70 BPM | WEIGHT: 119.3 LBS

## 2025-07-11 PROBLEM — A41.52 SEPSIS DUE TO PSEUDOMONAS SPECIES WITHOUT ACUTE ORGAN DYSFUNCTION: Status: ACTIVE | Noted: 2025-07-11

## 2025-07-11 PROBLEM — A41.9 SEPSIS: Status: RESOLVED | Noted: 2025-07-05 | Resolved: 2025-07-11

## 2025-07-11 PROBLEM — R79.89 ELEVATED TROPONIN: Status: RESOLVED | Noted: 2025-07-06 | Resolved: 2025-07-11

## 2025-07-11 PROBLEM — A49.9 UTI (URINARY TRACT INFECTION), BACTERIAL: Status: ACTIVE | Noted: 2025-07-11

## 2025-07-11 PROBLEM — N39.0 UTI (URINARY TRACT INFECTION), BACTERIAL: Status: ACTIVE | Noted: 2025-07-11

## 2025-07-11 LAB
ANION GAP SERPL CALCULATED.3IONS-SCNC: 11 MMOL/L (ref 5–15)
BUN SERPL-MCNC: 47.1 MG/DL (ref 8–23)
BUN/CREAT SERPL: 18.3 (ref 7–25)
CALCIUM SPEC-SCNC: 10 MG/DL (ref 8.6–10.5)
CHLORIDE SERPL-SCNC: 101 MMOL/L (ref 98–107)
CO2 SERPL-SCNC: 24 MMOL/L (ref 22–29)
CREAT SERPL-MCNC: 2.58 MG/DL (ref 0.76–1.27)
EGFRCR SERPLBLD CKD-EPI 2021: 24.9 ML/MIN/1.73
GLUCOSE SERPL-MCNC: 112 MG/DL (ref 65–99)
POTASSIUM SERPL-SCNC: 4.2 MMOL/L (ref 3.5–5.2)
SODIUM SERPL-SCNC: 136 MMOL/L (ref 136–145)

## 2025-07-11 PROCEDURE — 94799 UNLISTED PULMONARY SVC/PX: CPT

## 2025-07-11 PROCEDURE — 25010000002 EPOETIN ALFA-EPBX 10000 UNIT/ML SOLUTION: Performed by: INTERNAL MEDICINE

## 2025-07-11 PROCEDURE — 25010000002 CEFEPIME PER 500 MG: Performed by: FAMILY MEDICINE

## 2025-07-11 PROCEDURE — 25010000002 HEPARIN (PORCINE) PER 1000 UNITS: Performed by: INTERNAL MEDICINE

## 2025-07-11 PROCEDURE — 94664 DEMO&/EVAL PT USE INHALER: CPT

## 2025-07-11 PROCEDURE — 80048 BASIC METABOLIC PNL TOTAL CA: CPT | Performed by: INTERNAL MEDICINE

## 2025-07-11 RX ORDER — CARVEDILOL 6.25 MG/1
12.5 TABLET ORAL 2 TIMES DAILY WITH MEALS
Status: DISCONTINUED | OUTPATIENT
Start: 2025-07-11 | End: 2025-07-11 | Stop reason: HOSPADM

## 2025-07-11 RX ORDER — CALCIUM CARBONATE 500 MG/1
2 TABLET, CHEWABLE ORAL ONCE AS NEEDED
Status: COMPLETED | OUTPATIENT
Start: 2025-07-11 | End: 2025-07-11

## 2025-07-11 RX ORDER — OXYCODONE AND ACETAMINOPHEN 5; 325 MG/1; MG/1
1 TABLET ORAL EVERY 8 HOURS PRN
Qty: 9 TABLET | Refills: 0 | Status: SHIPPED | OUTPATIENT
Start: 2025-07-11 | End: 2025-07-14

## 2025-07-11 RX ORDER — PROMETHAZINE HYDROCHLORIDE 25 MG/1
12.5 TABLET ORAL EVERY 6 HOURS PRN
Status: DISCONTINUED | OUTPATIENT
Start: 2025-07-11 | End: 2025-07-11

## 2025-07-11 RX ORDER — ALPRAZOLAM 0.25 MG
0.25 TABLET ORAL ONCE AS NEEDED
Status: COMPLETED | OUTPATIENT
Start: 2025-07-11 | End: 2025-07-11

## 2025-07-11 RX ORDER — PROMETHAZINE HYDROCHLORIDE 12.5 MG/1
12.5 SUPPOSITORY RECTAL EVERY 6 HOURS PRN
Status: DISCONTINUED | OUTPATIENT
Start: 2025-07-11 | End: 2025-07-11

## 2025-07-11 RX ORDER — OXYCODONE AND ACETAMINOPHEN 5; 325 MG/1; MG/1
1 TABLET ORAL EVERY 8 HOURS PRN
Start: 2025-07-11 | End: 2025-07-11

## 2025-07-11 RX ORDER — OXYCODONE AND ACETAMINOPHEN 5; 325 MG/1; MG/1
1 TABLET ORAL EVERY 8 HOURS PRN
Qty: 9 TABLET | Refills: 0
Start: 2025-07-11 | End: 2025-07-11

## 2025-07-11 RX ORDER — OXYCODONE AND ACETAMINOPHEN 5; 325 MG/1; MG/1
1 TABLET ORAL EVERY 8 HOURS PRN
Qty: 9 TABLET | Refills: 0 | Status: SHIPPED | OUTPATIENT
Start: 2025-07-11 | End: 2025-07-11

## 2025-07-11 RX ADMIN — IPRATROPIUM BROMIDE 0.5 MG: 0.5 SOLUTION RESPIRATORY (INHALATION) at 05:43

## 2025-07-11 RX ADMIN — EMPAGLIFLOZIN 10 MG: 10 TABLET, FILM COATED ORAL at 13:56

## 2025-07-11 RX ADMIN — BISACODYL 10 MG: 10 SUPPOSITORY RECTAL at 15:46

## 2025-07-11 RX ADMIN — EPOETIN ALFA-EPBX 10000 UNITS: 10000 INJECTION, SOLUTION INTRAVENOUS; SUBCUTANEOUS at 14:07

## 2025-07-11 RX ADMIN — ISOSORBIDE DINITRATE 10 MG: 10 TABLET ORAL at 19:00

## 2025-07-11 RX ADMIN — ANTACID TABLETS 2 TABLET: 500 TABLET, CHEWABLE ORAL at 20:45

## 2025-07-11 RX ADMIN — Medication 10 ML: at 14:16

## 2025-07-11 RX ADMIN — HYDRALAZINE HYDROCHLORIDE 50 MG: 50 TABLET ORAL at 20:44

## 2025-07-11 RX ADMIN — IPRATROPIUM BROMIDE 0.5 MG: 0.5 SOLUTION RESPIRATORY (INHALATION) at 14:42

## 2025-07-11 RX ADMIN — MONTELUKAST 10 MG: 10 TABLET, FILM COATED ORAL at 20:45

## 2025-07-11 RX ADMIN — PANTOPRAZOLE SODIUM 40 MG: 40 TABLET, DELAYED RELEASE ORAL at 06:02

## 2025-07-11 RX ADMIN — DOCUSATE SODIUM 50 MG AND SENNOSIDES 8.6 MG 2 TABLET: 8.6; 5 TABLET, FILM COATED ORAL at 20:44

## 2025-07-11 RX ADMIN — OXYCODONE HYDROCHLORIDE AND ACETAMINOPHEN 1 TABLET: 5; 325 TABLET ORAL at 07:08

## 2025-07-11 RX ADMIN — CLOPIDOGREL BISULFATE 75 MG: 75 TABLET, FILM COATED ORAL at 13:56

## 2025-07-11 RX ADMIN — HEPARIN SODIUM 5000 UNITS: 5000 INJECTION INTRAVENOUS; SUBCUTANEOUS at 14:09

## 2025-07-11 RX ADMIN — LEVOTHYROXINE SODIUM 100 MCG: 0.1 TABLET ORAL at 06:02

## 2025-07-11 RX ADMIN — TAMSULOSIN HYDROCHLORIDE 0.4 MG: 0.4 CAPSULE ORAL at 20:45

## 2025-07-11 RX ADMIN — ACETAMINOPHEN 650 MG: 325 TABLET ORAL at 11:20

## 2025-07-11 RX ADMIN — CARVEDILOL 12.5 MG: 6.25 TABLET, FILM COATED ORAL at 19:00

## 2025-07-11 RX ADMIN — CEFEPIME 2000 MG: 2 INJECTION, POWDER, FOR SOLUTION INTRAVENOUS at 15:12

## 2025-07-11 RX ADMIN — BUDESONIDE AND FORMOTEROL FUMARATE DIHYDRATE 2 PUFF: 160; 4.5 AEROSOL RESPIRATORY (INHALATION) at 05:49

## 2025-07-11 RX ADMIN — BUDESONIDE AND FORMOTEROL FUMARATE DIHYDRATE 2 PUFF: 160; 4.5 AEROSOL RESPIRATORY (INHALATION) at 18:30

## 2025-07-11 RX ADMIN — OXYCODONE HYDROCHLORIDE AND ACETAMINOPHEN 1 TABLET: 5; 325 TABLET ORAL at 13:55

## 2025-07-11 RX ADMIN — ISOSORBIDE DINITRATE 10 MG: 10 TABLET ORAL at 13:57

## 2025-07-11 RX ADMIN — ATORVASTATIN CALCIUM 10 MG: 10 TABLET, FILM COATED ORAL at 13:56

## 2025-07-11 RX ADMIN — HEPARIN SODIUM 5000 UNITS: 5000 INJECTION INTRAVENOUS; SUBCUTANEOUS at 20:45

## 2025-07-11 RX ADMIN — DOCUSATE SODIUM 50 MG AND SENNOSIDES 8.6 MG 2 TABLET: 8.6; 5 TABLET, FILM COATED ORAL at 13:57

## 2025-07-11 RX ADMIN — ASPIRIN 81 MG: 81 TABLET, COATED ORAL at 13:56

## 2025-07-11 RX ADMIN — IPRATROPIUM BROMIDE 0.5 MG: 0.5 SOLUTION RESPIRATORY (INHALATION) at 18:30

## 2025-07-11 RX ADMIN — CALCITRIOL CAPSULES 0.25 MCG 0.5 MCG: 0.25 CAPSULE ORAL at 13:56

## 2025-07-11 RX ADMIN — Medication 150 MG: at 13:57

## 2025-07-11 RX ADMIN — NIFEDIPINE 90 MG: 60 TABLET, FILM COATED, EXTENDED RELEASE ORAL at 13:57

## 2025-07-11 RX ADMIN — GABAPENTIN 300 MG: 300 CAPSULE ORAL at 20:45

## 2025-07-11 RX ADMIN — ALPRAZOLAM 0.25 MG: 0.25 TABLET ORAL at 00:16

## 2025-07-11 NOTE — PROGRESS NOTES
Nephrology ([facility]) Progress Note      Patient:  Ricardo Hugo  YOB: 1950  Date of Service: 7/11/2025  MRN: [id number]   Acct: [id number]   Primary Care Physician: Nathan Zimmer MD  Advance Directive:   Code Status and Medical Interventions: CPR (Attempt to Resuscitate); Full Support   Ordered at: 07/05/25 2124     Code Status (Patient has no pulse and is not breathing):    CPR (Attempt to Resuscitate)     Medical Interventions (Patient has pulse or is breathing):    Full Support     Level Of Support Discussed With:    Patient     Admit Date: 7/5/2025       Hospital Day: 6  Referring Provider: No Known Provider      Patient personally seen and examined.  Complete chart including Consults, Notes, Operative Reports, Labs, Cardiology, and Radiology studies reviewed as able.        Subjective:  Ricardo Hugo is a 75 y.o. male for whom we were consulted for evaluation and treatment of end stage renal disease on hemodialysis Monday Wednesday Friday. History of diastolic CHF, CAD with prior CABG, hypertension. Recent admission to [facility] and had right AKA. Discharged to rehab facility on 7/03.   Returned to ER on 7/05 due to findings of confusion and fever of 105 while at outpatient dialysis clinic. Denied cough, abdominal pain, dysuria. Some confusion on initial nephrology exam. Tolerated dialysis well on 7/07 and with subsequent treatments.    Today is awake and alert. Remains weak but has no new complaints or overnight issues.     Allergies:  Ondansetron, Zofran [ondansetron hcl], Lortab [hydrocodone-acetaminophen], and Allopurinol    Home Meds:  Medications Prior to Admission   Medication Sig Dispense Refill Last Dose/Taking    ALPRAZolam (XANAX) 0.5 MG tablet Take 1 tablet by mouth At Night As Needed for Anxiety or Sleep.   Past Month Bedtime    aspirin 81 MG chewable tablet Chew 1 tablet Daily.   Taking    atorvastatin (LIPITOR) 10 MG tablet Take 1 tablet by  mouth Daily. 90 tablet 3 Taking    Budeson-Glycopyrrol-Formoterol (Breztri Aerosphere) 160-9-4.8 MCG/ACT aerosol inhaler Inhale 2 puffs 2 (Two) Times a Day.   Taking    calcitriol (ROCALTROL) 0.5 MCG capsule Take 1 capsule by mouth Daily. 90 capsule 2 Taking    carvedilol (COREG) 12.5 MG tablet Take 1 tablet by mouth 2 (Two) Times a Day With Meals.   Taking    clopidogrel (PLAVIX) 75 MG tablet Take 1 tablet by mouth Daily.   Taking    empagliflozin (Jardiance) 10 MG tablet tablet Take 1 tablet by mouth Daily.   Taking    esomeprazole (nexIUM) 20 MG capsule Take 1 capsule by mouth Every Morning Before Breakfast.   Taking    gabapentin (NEURONTIN) 300 MG capsule Take 1 capsule by mouth Every Night for 24 doses.   Taking    hydrALAZINE (APRESOLINE) 100 MG tablet Take 0.5 tablets by mouth 3 (Three) Times a Day.   Taking    iron polysaccharides (NIFEREX) 150 MG capsule Take 1 capsule by mouth Daily.   Taking    isosorbide dinitrate (ISORDIL) 10 MG tablet Take 1 tablet by mouth 3 (Three) Times a Day. 270 tablet 2 Taking    levothyroxine (Synthroid) 100 MCG tablet Take 1 tablet by mouth Every Morning. 90 tablet 2 Taking    magnesium chloride-calcium (SLOW MAGNESIUM/CALCIUM)  MG tablet delayed-release ec tablet Take 1 tablet by mouth Daily.   Taking    montelukast (SINGULAIR) 10 MG tablet Take 1 tablet by mouth Every Night.   Taking    Multiple Vitamins-Minerals (PRESERVISION/LUTEIN) capsule Take 1 capsule by mouth 2 (two) times a day.   Taking    NIFEdipine XL (ADALAT CC) 60 MG 24 hr tablet Take 2 tablets by mouth Daily.   Taking    polyethylene glycol (MIRALAX) 17 g packet Take 17 g by mouth Daily. For constipation   Taking    tamsulosin (FLOMAX) 0.4 MG capsule 24 hr capsule Take 1 capsule by mouth Every Night.   Taking    valsartan (DIOVAN) 320 MG tablet Take 1 tablet by mouth Daily.   Taking    vitamin D (ERGOCALCIFEROL) 1.25 MG (68690 UT) capsule capsule Take 1 capsule by mouth 1 (One) Time Per Week. Mondays    Taking    [DISCONTINUED] oxyCODONE-acetaminophen (PERCOCET) 5-325 MG per tablet Take 1 tablet by mouth Every 6 (Six) Hours As Needed for Moderate Pain for up to 24 doses. 24 tablet 0 7/5/2025    acetaminophen (TYLENOL) 325 MG tablet Take 2 tablets by mouth Every 4 (Four) Hours As Needed for Mild Pain.   Unknown    albuterol sulfate  (90 Base) MCG/ACT inhaler Inhale 2 puffs Every 6 (Six) Hours As Needed for Wheezing or Shortness of Air.   Unknown    bisacodyl (DULCOLAX) 10 MG suppository Insert 1 suppository into the rectum Daily As Needed for Constipation.   Unknown    desoximetasone (TOPICORT) 0.25 % cream Apply 1 Application topically to the appropriate area as directed 2 (Two) Times a Day As Needed for Irritation. irritation 60 g 0     docusate sodium (COLACE) 100 MG capsule Take 1 capsule by mouth 3 (Three) Times a Day As Needed for Constipation.   Unknown    fluticasone (FLONASE) 50 MCG/ACT nasal spray Administer 2 sprays into the nostril(s) as directed by provider Daily As Needed for Allergies.   Unknown    naloxone (NARCAN) 4 MG/0.1ML nasal spray Call 911. Don't prime. Morton in 1 nostril for overdose. Repeat in 2-3 minutes in other nostril if no or minimal breathing/responsiveness. 2 each 0 Unknown    senna 8.6 MG tablet Take 2 tablets by mouth Daily As Needed for Constipation.   Unknown       Medicines:  Current Facility-Administered Medications   Medication Dose Route Frequency Provider Last Rate Last Admin    acetaminophen (TYLENOL) tablet 650 mg  650 mg Oral Q4H PRN Dc Issa DO   650 mg at 07/09/25 0945    albuterol (ACCUNEB) nebulizer solution 0.63 mg  0.63 mg Nebulization Q6H PRN Vidal Miramontes MD        aspirin EC tablet 81 mg  81 mg Oral Daily Dc Issa DO   81 mg at 07/10/25 0826    atorvastatin (LIPITOR) tablet 10 mg  10 mg Oral Daily Dc Issa DO   10 mg at 07/10/25 0827    sennosides-docusate (PERICOLACE) 8.6-50 MG per tablet 2 tablet  2 tablet Oral BID Maegan  Dc GUARDADO DO   2 tablet at 07/10/25 2102    And    polyethylene glycol (MIRALAX) packet 17 g  17 g Oral Daily PRN Dc Issa DO        And    bisacodyl (DULCOLAX) EC tablet 5 mg  5 mg Oral Daily PRN Dc Issa DO        And    bisacodyl (DULCOLAX) suppository 10 mg  10 mg Rectal Daily PRN Dc Issa DO        budesonide-formoterol (SYMBICORT) 160-4.5 MCG/ACT inhaler 2 puff  2 puff Inhalation BID - RT Ramon Aguilera MD   2 puff at 07/11/25 0549    And    ipratropium (ATROVENT) nebulizer solution 0.5 mg  0.5 mg Nebulization TID - RT Ramon Aguilera MD   0.5 mg at 07/11/25 0543    calcitriol (ROCALTROL) capsule 0.5 mcg  0.5 mcg Oral Daily Dc Issa DO   0.5 mcg at 07/10/25 0826    carvedilol (COREG) tablet 12.5 mg  12.5 mg Oral BID With Meals Ramon Aguilera MD        cefepime 2000 mg IVPB in 100 mL NS (MBP)  2,000 mg Intravenous Q24H Ramon Aguilera MD   2,000 mg at 07/10/25 1730    clopidogrel (PLAVIX) tablet 75 mg  75 mg Oral Daily Dc Issa DO   75 mg at 07/10/25 0826    empagliflozin (JARDIANCE) tablet 10 mg  10 mg Oral Daily Vidal Miramontes MD   10 mg at 07/10/25 0826    epoetin cecile-epbx (RETACRIT) injection 10,000 Units  10,000 Units Subcutaneous Once per day on Monday Wednesday Friday Giuliano Saldana MD   10,000 Units at 07/09/25 1224    fluticasone (FLONASE) 50 MCG/ACT nasal spray 2 spray  2 spray Nasal Daily PRN Vidal Miramontes MD        gabapentin (NEURONTIN) capsule 300 mg  300 mg Oral Nightly Dc Issa DO   300 mg at 07/10/25 2101    heparin (porcine) 5000 UNIT/ML injection 5,000 Units  5,000 Units Subcutaneous Q12H Dc Issa DO   5,000 Units at 07/10/25 2102    heparin (porcine) injection 3,200 Units  3,200 Units Intracatheter PRN Twan Quiroz MD   3,200 Units at 07/07/25 1201    hydrALAZINE (APRESOLINE) tablet 50 mg  50 mg Oral TID Ramon Aguilera MD   50 mg at 07/10/25 2102    iron polysaccharides (NIFEREX) capsule 150 mg   150 mg Oral Daily Dc Issa DO   150 mg at 07/10/25 0826    isosorbide dinitrate (ISORDIL) tablet 10 mg  10 mg Oral TID - Nitrates Dc Issa DO   10 mg at 07/10/25 1730    levothyroxine (SYNTHROID, LEVOTHROID) tablet 100 mcg  100 mcg Oral Q AM Dc Issa DO   100 mcg at 07/11/25 0602    montelukast (SINGULAIR) tablet 10 mg  10 mg Oral Nightly Dc Issa DO   10 mg at 07/10/25 2102    NIFEdipine XL (PROCARDIA XL) 24 hr tablet 90 mg  90 mg Oral Daily Ramon Aguilera MD   90 mg at 07/10/25 0826    oxyCODONE-acetaminophen (PERCOCET) 5-325 MG per tablet 1 tablet  1 tablet Oral Q6H PRN Dc Issa DO   1 tablet at 07/11/25 0708    pantoprazole (PROTONIX) EC tablet 40 mg  40 mg Oral Q AM Dc Issa DO   40 mg at 07/11/25 0602    sodium chloride 0.9 % flush 10 mL  10 mL Intravenous Q12H Dc Issa DO   10 mL at 07/10/25 2102    sodium chloride 0.9 % flush 10 mL  10 mL Intravenous PRN Dc Issa DO        sodium chloride 0.9 % infusion 40 mL  40 mL Intravenous PRN Dc Issa DO        tamsulosin (FLOMAX) 24 hr capsule 0.4 mg  0.4 mg Oral Nightly Vidal Miramontes MD   0.4 mg at 07/10/25 2101    valsartan (DIOVAN) tablet 320 mg  320 mg Oral Daily Vidal Miramontes MD   320 mg at 07/10/25 0825       Past Medical History:  Past Medical History:   Diagnosis Date    3-vessel CAD 08/11/2020    Allergic rhinitis     Anemia     Anxiety disorder 04/27/2020    Arthritis     Asymmetrical sensorineural hearing loss 06/28/2017    Atherosclerosis of native artery of both lower extremities with intermittent claudication 07/18/2019    Avascular necrosis of femoral head, left 07/11/2020    right hip after surgery    Carotid stenosis     Chronic mucoid otitis media     Chronic rhinitis     COPD (chronic obstructive pulmonary disease)     Coronary artery disease     HEART BYPASS 2004    Crohn's disease of large intestine with other complication 07/30/2020    Chronic diarrhea  Colonoscopy July 2020 revealed mild patchy scattered hemosiderin staining with inflammation more so in rectosigmoid area.  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    Iron deficiency anemia 07/14/2020    Mixed hearing loss of left ear     PAD (peripheral artery disease) 08/11/2020    Perianal abscess     Pernicious anemia 08/17/2020    took shots but never diagnosed with b12 deficiency    Personal history of alcoholism 08/11/2020    quit drinking in 2013    Prostatic hypertrophy 08/11/2020    Sensorineural hearing loss     Sepsis with acute renal failure 09/15/2020    Shortness of breath 05/27/2021    Tinnitus     Ventricular tachycardia, nonsustained 07/14/2020    Weight loss 07/11/2020       Past Surgical History:  Past Surgical History:   Procedure Laterality Date    ABOVE KNEE AMPUTATION Right 6/24/2025    Procedure: right above knee amputation;  Surgeon: Blayne Zabala MD;  Location:  [facility] OR;  Service: Vascular;  Laterality: Right;    AORTOGRAM Right 4/25/2025    Procedure: RIGHT LOWER EXTREMITY ANGIOGRAM, SHOCKWAVE LITHOTRIPSY, BALLOON ANGIOPLASTY, MYNX CLOSURE;  Surgeon: Gil Pineda DO;  Location:  [facility];  Service: Vascular;  Laterality: Right;    AORTOGRAM Left 5/22/2025    Procedure: LEFT LOWER EXTREMITY ANGIOGRAM, SHOCKWAVE LITHOTRIPSY, BALLOON ANGIOPLASTY, STENT PLACEMENT, MYNX CLOSURE;  Surgeon: Blayne Zabala MD;  Location:  [facility];  Service: Vascular;  Laterality: Left;    AORTOGRAM Right 5/30/2025    Procedure: AORTOILIAC ANGIOGRAM WITH LEFT LOWER EXTREMITY ANGIOGRAM;  Surgeon: Gil Pineda DO;  Location:  [facility] HYBRID OR;  Service: Vascular;  Laterality: Right;    AORTOGRAM Right 6/20/2025    Procedure: right lower extremity arteriogram, shockwave lithotripsy, balloon angioplasty, mynx closue;  Surgeon: Blayne Zabala MD;  Location: [facility] HYBRID OR;  Service: Vascular;  Laterality: Right;    ARTERY SURGERY  2021    right side on neck    CAROTID ENDARTERECTOMY Right 05/10/2021    Procedure: RIGHT CAROTID ENDARTERECTOMY WITH EEG;  Surgeon: Gil Pineda DO;  Location: [facility] HYBRID OR 12;  Service: Vascular;  Laterality: Right;    COLONOSCOPY N/A 07/02/2020    Procedure: COLONOSCOPY WITH ANESTHESIA;  Surgeon: Adrien Brewster MD;  Location: [facility] ENDOSCOPY;  Service: Gastroenterology;  Laterality: N/A;  pre op: diarrhea  post op: polyps  PCP: Joe Velasco MD    COLONOSCOPY N/A 10/13/2020    Procedure: COLONOSCOPY WITH ANESTHESIA;  Surgeon: Adrien Brewster MD;  Location: [facility] ENDOSCOPY;  Service: Gastroenterology;  Laterality: N/A;  Pre: Chronic Diarrhea, Crohn's  Post: AVM  Dr. Neftali Velasco  CO2 Inflation Used    COLONOSCOPY N/A 12/08/2023    Procedure: COLONOSCOPY WITH ANESTHESIA;  Surgeon: Adrien Brewster MD;  Location: [facility] ENDOSCOPY;  Service: Gastroenterology;  Laterality: N/A;  pre chrone's disease  post sub optimal prep, polyp, chrone's      CORONARY ARTERY BYPASS GRAFT  2003    x3    ENDOSCOPY N/A 11/02/2021    Procedure: ESOPHAGOGASTRODUODENOSCOPY WITH ANESTHESIA;  Surgeon: Bridger Bell MD;  Location: [facility] ENDOSCOPY;  Service: Gastroenterology;  Laterality: N/A;  pre anemia;gi bleed  post  gi bleed;schatski ring  Dr. ERIC Velasco    ENDOSCOPY N/A 10/10/2023    Procedure: ESOPHAGOGASTRODUODENOSCOPY WITH ANESTHESIA;  Surgeon: Adrien Brewster MD;  Location: [facility] ENDOSCOPY;  Service: Gastroenterology;  Laterality: N/A;  preop; anemia  postop esophagitis ; R/O barretts   PCP Randall Beata    EYE SURGERY Bilateral     catorac    INCISION AND DRAINAGE PERIRECTAL ABSCESS  N/A 2017    Procedure: INCISION AND DRAINAGE OF JEET ANAL ABSCESS;  Surgeon: Lynette Smith MD;  Location:  [facility] OR;  Service:     INGUINAL HERNIA REPAIR Bilateral 2023    Procedure: INGUINAL HERNIA BILATERAL REPAIR LAPAROSCOPIC WITH DAVINCI ROBOT WITH MESH;  Surgeon: Tahira Rivera MD;  Location:  [facility] OR;  Service: Robotics - DaVinci;  Laterality: Bilateral;    INSERTION HEMODIALYSIS CATHETER N/A 2025    Procedure: HEMODIALYSIS CATHETER PLACEMENT;  Surgeon: Gil Pineda DO;  Location:  [facility];  Service: Vascular;  Laterality: N/A;    MYRINGOTOMY W/ TUBES Left 2017    06/10/2016    TONSILLECTOMY      TOTAL HIP ARTHROPLASTY Right        Family History  Family History   Problem Relation Age of Onset    Breast cancer Mother     Dementia Father     Glaucoma Father     No Known Problems Daughter     Colon polyps Neg Hx     Colon cancer Neg Hx        Social History  Social History     Socioeconomic History    Marital status:    Tobacco Use    Smoking status: Former     Current packs/day: 0.00     Average packs/day: 0.5 packs/day for 25.8 years (12.9 ttl pk-yrs)     Types: Cigarettes     Start date:      Quit date: 10/13/2013     Years since quittin.7     Passive exposure: Past    Smokeless tobacco: Never    Tobacco comments:     quit    Vaping Use    Vaping status: Former    Substances: Nicotine    Devices: Pre-filled or refillable cartridge   Substance and Sexual Activity    Alcohol use: Not Currently    Drug use: No    Sexual activity: Not Currently     Partners: Female       Review of Systems:  History obtained from chart review and the patient  General ROS: No fever or chills  Respiratory ROS: No cough, shortness of breath, wheezing  Cardiovascular ROS: No chest pain or palpitations  Gastrointestinal ROS: No abdominal pain or melena  Genito-Urinary ROS: No dysuria or hematuria  Psych ROS: No anxiety and depression  14 point ROS reviewed with  the patient and negative except as noted above and in the HPI unless unable to obtain.    Objective:  Patient Vitals for the past 24 hrs:   BP Temp Temp src Pulse Resp SpO2 Weight   07/11/25 0755 155/47 97.4 °F (36.3 °C) Oral 75 18 95 % --   07/11/25 0631 159/52 98.6 °F (37 °C) Oral 76 16 99 % --   07/11/25 0549 -- -- -- 77 14 95 % 54.1 kg (119 lb 4.8 oz)   07/11/25 0543 -- -- -- 81 14 96 % --   07/10/25 2014 164/54 98.6 °F (37 °C) Oral 76 16 97 % --   07/10/25 1907 -- -- -- 74 16 97 % --   07/10/25 1900 -- -- -- 77 16 97 % --   07/10/25 1617 145/60 98.4 °F (36.9 °C) Oral 70 14 97 % --   07/10/25 1338 -- -- -- 57 14 97 % --   07/10/25 1332 -- -- -- 60 16 98 % --   07/10/25 1131 127/50 98.4 °F (36.9 °C) Oral 71 14 95 % --       Intake/Output Summary (Last 24 hours) at 7/11/2025 0944  Last data filed at 7/10/2025 1846  Gross per 24 hour   Intake 600 ml   Output 200 ml   Net 400 ml     General: awake/alert   Chest:  clear to auscultation bilaterally without respiratory distress  CVS: regular rate and rhythm  Abdominal: soft, nontender, positive bowel sounds  Extremities: right AKA and no cyanosis or edema  Skin: warm and dry without rash      Labs:  Results from last 7 days   Lab Units 07/10/25  0439 07/09/25  0326 07/07/25  0410 07/06/25  0537   WBC 10*3/mm3  --  9.98 16.96* 22.70*   HEMOGLOBIN g/dL 8.6* 7.9* 8.6* 8.7*   HEMATOCRIT % 28.3* 25.5* 27.0* 27.4*   PLATELETS 10*3/mm3  --  148 157 159         Results from last 7 days   Lab Units 07/11/25  0434 07/10/25  0439 07/09/25  0326 07/08/25  0506 07/07/25  0410 07/06/25  0537   SODIUM mmol/L 136 137 135*   < > 134* 132*   POTASSIUM mmol/L 4.2 3.6 3.9   < > 3.9 3.7   CHLORIDE mmol/L 101 100 98   < > 100 97*   CO2 mmol/L 24.0 25.0 25.0   < > 22.0 25.0   BUN mg/dL 47.1* 26.8* 43.0*   < > 42.9* 33.9*   CREATININE mg/dL 2.58* 2.00* 2.88*   < > 3.10* 2.47*   CALCIUM mg/dL 10.0 9.0 9.1   < > 9.0 8.5*   EGFR mL/min/1.73 24.9* 33.7* 21.8*   < > 19.9* 26.2*   BILIRUBIN mg/dL   --   --  0.2  --  0.2 0.4   ALK PHOS U/L  --   --  287*  --  268* 312*   ALT (SGPT) U/L  --   --  20  --  29 48*   AST (SGOT) U/L  --   --  18  --  23 51*   GLUCOSE mg/dL 112* 88 101*   < > 96 94    < > = values in this interval not displayed.       Radiology:   Imaging Results (Last 72 Hours)       ** No results found for the last 72 hours. **            Culture:  Blood Culture   Date Value Ref Range Status   07/05/2025 No growth at 24 hours  Preliminary   07/05/2025 No growth at 24 hours  Preliminary     Urine Culture   Date Value Ref Range Status   07/05/2025 Culture in progress  Preliminary         Assessment    End stage renal disease on HD MWF  Hypertension  Recent right AKA  Sepsis   Anemia of CKD  Chronic diastolic CHF  Hyponatremia--improved    Plan:   Dialysis today  Monitor labs  OK for discharge from renal standpoint, follow up will be via dialysis clinic      Slava Tate, STERLING  7/11/2025  09:44 CDT

## 2025-07-11 NOTE — PLAN OF CARE
SR [id number]  Problem: Skin Injury Risk Increased  Goal: Skin Health and Integrity  7/11/2025 0415 by Priscila Cooper RN  Outcome: Progressing  7/11/2025 0415 by Priscila Cooper RN  Outcome: Progressing     Problem: Fall Injury Risk  Goal: Absence of Fall and Fall-Related Injury  7/11/2025 0415 by Priscila Cooper RN  Outcome: Progressing  7/11/2025 0415 by Priscila Cooper RN  Outcome: Progressing  Intervention: Promote Injury-Free Environment  Recent Flowsheet Documentation  Taken 7/11/2025 0400 by Priscila Cooper RN  Safety Promotion/Fall Prevention: safety round/check completed  Taken 7/11/2025 0200 by Priscila Cooper RN  Safety Promotion/Fall Prevention: safety round/check completed  Taken 7/11/2025 0000 by Priscila Cooper RN  Safety Promotion/Fall Prevention: safety round/check completed  Taken 7/10/2025 2300 by Priscila Cooper RN  Safety Promotion/Fall Prevention: safety round/check completed  Taken 7/10/2025 2200 by Priscila Cooper RN  Safety Promotion/Fall Prevention: safety round/check completed  Taken 7/10/2025 2100 by Priscila Cooper RN  Safety Promotion/Fall Prevention: safety round/check completed     Problem: Malnutrition  Goal: Improved Nutritional Intake  7/11/2025 0415 by Priscila Cooper RN  Outcome: Progressing  7/11/2025 0415 by Priscila Cooper RN  Outcome: Progressing

## 2025-07-11 NOTE — PROGRESS NOTES
2 L removed. Pt stable.      [facility]  DIALYSIS TREATMENT SUMMARY     Note: Consult with the attending physician for patient treatment orders, this document is not a physician order.     Patient Information  Patient Ricardo Hugo  Date of Birth February 22, 1950  Chart Number [id number]  Location [facility]  Location MRN [id number]  Gender Male  SSN (last 4)   Treatment Information  Treatment Type Hemodialysis  Treatment Id [id number]  Start Time July 11, 2025 09:07  End Time July 11, 2025 12:38  Acutal Duration 03:31  Treatment Balances  Total Saline Administered 500  Net Fluid Balance -2000  Hemodialysis Orders  Therapy Standard  Orders Verified Time 07/11/2025 09:00   Date Verified 07/11/2025  Duration 3:30  Isolated UF/Bypass No  BFR (mL) 450  DFR (mL) 700  Dialyzer Type OPTIFLUX 160NR  UF Order UF Range  UF Range (mL) 1000 - 2000  With BP Parameters Yes  Crit-Line used No  Heparin Initial Units Bolus No  Heparin IV Maintenance Bolus No  Heparin IV Infusion No  Potassium (mEq/L) 3  Calcium (mEq/L) 2.5  Bicarb (mg/dL) 35  Sodium (mEq/L) 138  Additional Orders to keep sbp greater than 90  Clinician Kristan Beck RN  AV Access  Access Type Central Venous Catheter  Central Venous Catheter  Access Type Catheter - Tunneled  Access Location Chest Wall - R  Current/New [facility] Patient Yes  1st Use Catheter Verified by Previous Use  Catheter Care Completed per Policy Yes  Dressing Dry and Intact on Arrival Yes  Dressing Changed No  Type of Dressing Film Biopatch/CHG  Last Date Changed 07/09/2025  If No select Reason Dressing Change Not Indicated per Policy  Type of HD Caps Curos  HD Caps Changed Yes  CVC Line Education Provided Yes - Dressing Change Frequency  Vitals  Pre-Treatment Vitals  Time Is BP being recorded? Pre BP Map BP Method Pulse RR Temp How was Weight Obtained? Pre Weight Previous Dry Weight Previous Post Weight Metric Target Fluid Removal (mL) Dialysate  Confirmed Clinician  07/11/2025 09:03 BP/Map 149/63 (92) Noninvasive 68 14 97.4 How Obtained: Reported Floor Weight 54.1   Kgs 2500 Yes Kristan Beck RN  Comments: pt is a & o x 3. hemodynamically stable.  Intra Procedure Vitals  Rec Type Time Is BP being recorded? BP Map Pulse RR BFR DFR AP  TMP UFR RMVD Bolus NSS Flush Chills Safety Check Crit-Line HCT Crit-Line BV% Clinician  E 07/11/2025 12:38 BP/Map 107/62 (77) 76 16 200 700 -10 60 30 300 2500    Yes   Kristan Beck RN  Comments: tx complete. blood returning.  Post Treatment Vitals  Time Is BP being recorded? BP Map Pulse RR Temp How was Weight Obtained? Post Weight Metric BVP UF Goal Ordered NSS Given Intra-Procedure Total Machine UF Removed (mL) Crit-Line Ending Profile Crit-Line Refill Crit-Line Ending HCT Crit-Line Max BV% Clinician  07/11/2025 12:53 BP/Map 154/73 (100) 75 16 97.6 How Obtained: Reported Floor Weight 52.1 Kgs 86.5 2000 0 2500     Kristan Beck RN  Comments: blood returned.  Safety checks include: access uncovered and secured, Hemaclip secured for all central line access, machine checks performed, and alarm limits confirmed.  Labs  Hepatitis  HBsAG Lab Result HBsAG Lab Result HBsAG Draw Date Transient Antigenemia(MD Diagnosis Only) Anti-HBs Lab Result Anti-HBs Lab Value Anti-HBs Draw Date Documented By Documented Date Hepatitis Status Hepatitis Status  Negative  07/06/2025  Negative  07/06/2025 Kristan Beck 07/11/2025  Susceptible  Notes:   Pre-Treatment Hepatitis Precautions Copy of hepatitis results verified in hospital EMR Yes Signed By Kristan Beck RN  Patient tx with immune pts only Yes Hepatitis Information Entered By Kristan Beck RN   Patient tx outside buffer zone Yes Signing   Pre-Treatment Handoff  Staff Report Received Yes  Report Given by Primary Nurse Heidi Apodaca  Time 07:47  Patient Arrival  Patient ID Verified Date of Birth  Full Name  Patient Consent to treatment verified Yes  Blood Transfusion Consent  Verified N/A  Treatment Comments  Treatment Notes pt tolerated tx well without complications. 2 L removed. lumens flushed, locked with NS and capped. pt stable.  Post-Treatment Handoff  Report Given to Primary Nurse Heidi Boyd  Time Report Given 12:55  Report Given By Kristan Beck RN  Machine Validation  Time 08:02  Date 7/11/2025  Auto Alarm Test Passed Yes  Machine Serial # [id number]  Portable RO Yes  RO Serial# 16  Residual Bleach Negative N/A  Was a manufactured mix used? Yes  Machine Log Completed Yes  Total Chlorine (less than 0.1)? Yes  Total Chlorine Log Completed Yes  Bicarb BiBag  Bibag Size 900  Machine Temp 36.0  Machine Conductivity 13.8  Meter Type N/A  Meter Conductivity   Independent Conductivity 13.7  pH Status Pass Pass  pH   TCD Value 13.9  TCD Alarm with +/- 0.5 Yes  NVL enabled validated 100 asymmetric Yes  Safety check complete Kristan Beck RN  Second Verification Performed? Yes  Second Verification Performed By Nav Will RN  Reason Not Verified   Serum Lab Values  Time BUN Creatinine Na K (mEq/L) Cl CO2 Ca (mEq/L) Phos Mg (mg/dL) Alb (g/dL) Glucose Hgb Hct WBC Plt PT aPTT INR Other Clinician  07/11/2025 04:34 47.1 2.58 136 4.2 101 24 10 - - - 112 - - - - - - -  Kristan Beck RN  Comments:   Facility Information Bed # 5 Isolation Information  Facility Information Stat Treatment No Isolation Required? Y  Admission Date 07/05/2025 Patient Type Chronic dialysis patient with diagnosis of ESRD If yes, Explain Other  Ordering MD [facility] Is[facility] Type Contact  Account/Finance Number [id number] Patient [facility] Unit [facility] Other pseudomonas in urine  Admission Status InPatient Code Status Full Code Completed by  Location Dialysis Suite Multi Diagnosis General Tx information Entered by Kristan Beck RN  Room # 443 Diagnosis sepsis   Start Treatment Time Out Confirmed by Kristan Beck/Nav Will Correct access site verified Yes  Treatment  Initiation Connections Secured Time Out Completed 09:00 Treatment Start Date 07/11/2025  Saline line double clamped Correct patient verified Yes Treatment Start Time 09:07  Hemaclip Applied Correct procedure verified Yes Patient/Family questions and concerns addressed Yes  Pre Focused Assessment Notes room air  Bowel sounds present  Access Edema  Non distended  Signs and Symptoms of Infection? No Location None  Nontender  Pain Screening Cardiac   Does the patient have pain? No Heart Rhythm Regular  Voids  Respiratory Telemetry Yes Completed by  Lung Sounds Diminished Skin Pre Treatment Focused Assessment Completed By Kristan Beck RN  Location Bilateral Skin Warm Time 09:05  Bases LOC Signing  Position Bases LOC Alert and Oriented x3 Signed By Kristan Beck RN  Anterior GI / Bowels   Respiratory Efforts Unlabored GI Soft   Education  Hemodialysis treatment review Focus or Topic Hemodialysis treatment review  Patient Education  Treatment Adherence Method Knowledge / Understanding Assessed Teach back  Patient Educated? Yes Method Knowledge / Understanding Assessed Teach back Patient Education Reinforced By  Focus or Topic Access Maintenance Family Education Provided? N/A Patient Education Reinforced by Kristan Beck RN  Infection Prevention Caregiver Education Provided? Yes   Post-Treatment HD machine external disinfection completed per policy Yes Completed by  Post Treatment Delay PRO external disinfection completed per policy Yes Post Treatment Form Completed By Kristan Beck RN  Delay N Post Treatment General Information   Machine Disinfection Requirement Notes pt tolerated tx well without complications. 2 L removed. lumens flushed, locked with NS and capped. pt stable.   Post Focused Assessment Position Bases GI Soft  Changes from Pre Focused Assessment  Anterior  Bowel sounds present  Changes from Pre Treatment Focused Assessment? No Respiratory Efforts Unlabored  Non distended  Access Notes room  air  Nontender  Cath Packed with NS Edema   Catheter clamped and capped Yes Location None  Voids  Access Flow Good Cardiac Completed by  Dialyzer Clearance Streaked Heart Rhythm Regular Post Treatment Focused Assessment Completed by Kristan Beck RN  Pain Screening Telemetry Yes Date 07/11/2025  Does the patient have pain? No Skin Time 12:54  Respiratory Skin Warm Signing  Lung Sounds Diminished LOC Signed By Kristan Beck RN  Location Bilateral LOC Alert and Oriented x3   Bases GI / Bowels

## 2025-07-11 NOTE — THERAPY DISCHARGE NOTE
Acute Care - Physical Therapy Discharge Summary  [facility]       Patient Name: Ricardo Hugo  : 1950  MRN: [id number]    Today's Date: 2025                 Admit Date: 2025      PT Recommendation and Plan    Visit Dx:    ICD-10-CM ICD-9-CM   1. Acute cystitis without hematuria  N30.00 595.0   2. Elevated troponin  R79.89 790.6   3. Confusion  R41.0 298.9   4. Impaired mobility [Z74.09]  Z74.09 799.89   5. Arterial occlusion  I70.90 444.9   6. Dialysis patient  Z99.2 V45.11   7. Severe protein-calorie malnutrition  E43 262   8. Thrombocytopenia  D69.6 287.5   9. Hyperkalemia  E87.5 276.7   10. Acute pain of left lower extremity  M79.605 729.5   11. Anemia, unspecified type  D64.9 285.9   12. Preop testing  Z01.818 V72.84   13. ESRD (end stage renal disease) on dialysis  N18.6 585.6    Z99.2 V45.11   14. Abnormal TSH  R79.89 790.6   15. History of pneumonia, current treatment with cefdinir  Z87.01 V12.61   16. Bradycardia  R00.1 427.89   17. Chronic diastolic (congestive) heart failure  I50.32 428.32     428.0   18. Persistent proteinuria  R80.1 791.0   19. Dermatitis associated with moisture  L30.8 692.89   20. Sleep difficulties  G47.9 780.50   21. Bilateral inguinal hernia without obstruction or gangrene, recurrence not specified  K40.20 550.92   22. Unilateral recurrent inguinal hernia without obstruction or gangrene  K40.91 550.91   23. Hypertrophy of inferior nasal turbinate  J34.3 478.0   24. Nasal septal deviation  J34.2 470   25. Anticoagulated  Z79.01 V58.61   26. Sensorineural hearing loss (SNHL), bilateral  H90.3 389.18   27. Eustachian tube dysfunction, bilateral  H69.93 381.81   28. Systolic murmur  R01.1 785.2   29. Mixed hyperlipidemia  E78.2 272.2   30. Perforation of left tympanic membrane  H72.92 384.20   31. CLL (chronic lymphocytic leukemia)  C91.10 204.10   32. Low iron   E61.1 280.9   33. Copper deficiency  E61.0 275.1   34. Anemia due to chronic kidney disease, on chronic  dialysis  N18.6 585.6    D63.1 285.21    Z99.2 V45.11   35. CKD (chronic kidney disease) stage 5  N18.4 585.4   36. Diastolic dysfunction  I51.89 429.9   37. Carotid stenosis, right  I65.21 433.10   38. Stenosis of right carotid artery  I65.21 433.10   39. Bilateral carotid artery stenosis  I65.23 433.10     433.30   40. IHD (ischemic heart disease)  I25.9 414.9   41. Peripheral arterial disease  I73.9 443.9   42. Wellness examination  Z00.00 V70.0   43. Symptomatic anemia  D64.9 285.9   44. Chronic diarrhea  K52.9 787.91   45. Resistant hypertension  I1A.0 401.9   46. Essential hypertension  I10 401.9   47. Chronic rhinitis  J31.0 472.0                PT Rehab Goals       Row Name 07/11/25 1600             Bed Mobility Goal 1 (PT)    [place] Level/Cues Needed (Bed Mobility Goal 1, PT) minimum assist (75% or more patient effort)  Goal: IND  -SHARON      Progress/Outcomes (Bed Mobility Goal 1, PT) goal not met  -SHARON         Transfer Goal 1 (PT)    [place] Level/Cues Needed (Transfer Goal 1, PT) moderate assist (50-74% patient effort)  Goal: SBA  -SHARON      Progress/Outcome (Transfer Goal 1, PT) goal not met  -SHARON         Balance Goal 1 (PT)    [place] Level/Cues Needed (Balance Goal 1, PT) minimum assist (75% or more patient effort)  Goal: IND  -SHARON      Progress/Outcomes (Balance Goal 1, PT) goal not met  -SHARON                User Key  (r) = Recorded By, (t) = Taken By, (c) = Cosigned By      Initials Name Provider Type Discipline    Tram Barron PTA Physical Therapist Assistant PT                    Therapy Charges for Today       Code Description Service Date Service Provider Modifiers Qty    [id number]  PT THERAPEUTIC ACT EA 15 MIN 7/10/2025 Tram Hollingsworth PTA GP 1            PT Discharge Summary  Anticipated Discharge Disposition (PT): skilled nursing facility  Reason for Discharge: Discharge from facility  Outcomes Achieved: Unable to make functional progress toward goals at this  time  Discharge Destination: [facility]      Tram Hollingsworth, PTA   7/11/2025

## 2025-07-11 NOTE — DISCHARGE SUMMARY
[facility] Medicine Services  DISCHARGE SUMMARY       Date of Admission: 7/5/2025  Date of Discharge:  7/11/2025  Primary Care Physician: Nathan Zimmer MD    Presenting Problem/History of Present Illness:  75-year-old male with history of coronary artery disease status post CABG, chronic diastolic cardiomyopathy, end-stage renal disease on hemodialysis Monday Wednesday and Friday, anemia of prior kidney disease, hypertension, right above-the-knee amputation June 24, 2025, chronic obstructive pulmonary disease, hyperlipidemia, chronic lymphocytic leukemia, admitted to the hospital July 5, 2025 as per report, the patient was found confused and febrile in the dialysis center.     Final Discharge Diagnoses:  Active Hospital Problems    Diagnosis     Sepsis due to Pseudomonas species without acute organ dysfunction     UTI (urinary tract infection), bacterial     Coronary artery disease involving native coronary artery of native heart without angina pectoris     Chronic heart failure with preserved ejection fraction (HFpEF)     ESRD (end stage renal disease) on dialysis     Chronic diastolic (congestive) heart failure     Anemia due to chronic kidney disease     Peripheral arterial disease     IHD (ischemic heart disease)     Essential hypertension        Consults: Nephrology    Procedures Performed: None    Pertinent Test Results:   Results for orders placed during the hospital encounter of 01/10/25    Adult Transthoracic Echo Complete w/ Color, Spectral and Contrast if Necessary Per Protocol    Interpretation Summary    Left ventricular systolic function is normal. Left ventricular ejection fraction appears to be 56 - 60%.    Left ventricular wall thickness is consistent with mild septal asymmetric hypertrophy.    Left ventricular diastolic function is consistent with (grade II w/high LAP) pseudonormalization.    Normal right ventricular cavity size and systolic function  noted.    The left atrial cavity is mild to moderately dilated.    The right atrial cavity is dilated.    Mild aortic valve stenosis is present.    Mild to moderate mitral valve regurgitation is present.    Moderate to severe tricuspid valve regurgitation is present.    Estimated right ventricular systolic pressure from tricuspid regurgitation is markedly elevated (>55 mmHg).      Imaging Results (All)       Procedure Component Value Units Date/Time    XR Chest 1 View [[id number]] Collected: 07/05/25 1722     Updated: 07/05/25 1729    Narrative:      EXAMINATION: XR CHEST 1 VW- 7/5/2025 5:23 PM     HISTORY: Fever.     REPORT: Frontal view of the chest was obtained.     COMPARISON: Chest x-ray 6/19/2025.     There is interval improvement, near resolution of interstitial  infiltrates in the right lung, no lung consolidation or focal infiltrate  is identified. The left lung remains clear. The right internal jugular  central venous catheter appears in good position as before. Previous  median sternotomy and CABG is noted. Heart size appears normal, no  evidence of overt CHF is identified. The osseous structures are acutely  unremarkable. Mixed sclerotic/lytic changes in the right humeral head  appear similar, may be associated with osteonecrosis.       Impression:      Partial resolution of interstitial infiltrates in the right  lung, mild residual right-sided interstitial pneumonitis or mild  asymmetric pulmonary edema may be present. No lung consolidation or  acute focal infiltrate. Previous CABG. The right internal jugular  central venous catheter remains in good position.     This report was signed and finalized on 7/5/2025 5:26 PM by Dr. Ash Seay MD.             LAB RESULTS:      Lab 07/10/25  0439 07/09/25  0326 07/07/25  0410 07/06/25  0537 07/05/25  1758 07/05/25  1756   WBC  --  9.98 16.96* 22.70*  --  20.93*   HEMOGLOBIN 8.6* 7.9* 8.6* 8.7*  --  7.7*   HEMATOCRIT 28.3* 25.5* 27.0* 27.4*  --  23.4*    PLATELETS  --  148 157 159  --  170   NEUTROS ABS  --  7.89* 14.10* 18.75*  --  17.95*   IMMATURE GRANS (ABS)  --  0.18* 0.27* 0.58*  --  0.36*   LYMPHS ABS  --  0.82 1.14 1.24  --  0.77   MONOS ABS  --  0.63 0.95* 1.93*  --  1.60*   EOS ABS  --  0.37 0.42* 0.11  --  0.18   MCV  --  99.2* 98.5* 99.6*  --  97.5*   LACTATE  --   --   --   --  2.0  --          Lab 07/11/25  0434 07/10/25  0439 07/09/25 0326 07/08/25  0506 07/07/25  0410 07/06/25  0537   SODIUM 136 137 135* 135* 134* 132*   POTASSIUM 4.2 3.6 3.9 3.6 3.9 3.7   CHLORIDE 101 100 98 97* 100 97*   CO2 24.0 25.0 25.0 26.0 22.0 25.0   ANION GAP 11.0 12.0 12.0 12.0 12.0 10.0   BUN 47.1* 26.8* 43.0* 27.3* 42.9* 33.9*   CREATININE 2.58* 2.00* 2.88* 2.32* 3.10* 2.47*   EGFR 24.9* 33.7* 21.8* 28.2* 19.9* 26.2*   GLUCOSE 112* 88 101* 115* 96 94   CALCIUM 10.0 9.0 9.1 8.9 9.0 8.5*   MAGNESIUM  --   --   --   --   --  1.9   PHOSPHORUS  --   --   --   --   --  2.4*         Lab 07/09/25  0326 07/07/25  0410 07/06/25  0537 07/05/25  1756   TOTAL PROTEIN 5.6* 5.6* 5.6* 5.4*   ALBUMIN 2.6* 2.7* 2.9* 2.8*   GLOBULIN 3.0 2.9 2.7 2.6   ALT (SGPT) 20 29 48* 39   AST (SGOT) 18 23 51* 46*   BILIRUBIN 0.2 0.2 0.4 0.3   ALK PHOS 287* 268* 312* 322*         Lab 07/05/25  2304 07/05/25  1946 07/05/25  1756   HSTROP T 216* 176* 147*                 Brief Urine Lab Results  (Last result in the past 365 days)        Color   Clarity   Blood   Leuk Est   Nitrite   Protein   CREAT   Urine HCG        07/05/25 1841 Yellow   Turbid   Large (3+)   Large (3+)   Positive   >=300 mg/dL (3+)                 Microbiology Results (last 10 days)       Procedure Component Value - Date/Time    Urine Culture - Urine, Straight Cath [[id number]]  (Abnormal)  (Susceptibility) Collected: 07/05/25 1841    Lab Status: Final result Specimen: Urine from Straight Cath Updated: 07/09/25 1339     Urine Culture >100,000 CFU/mL Pseudomonas aeruginosa MDRO     Comment:   Multi drug resistant Pseudomonas, patient  may be an isolation risk.       Narrative:      Colonization of the urinary tract without infection is common. Treatment is discouraged unless the patient is symptomatic, pregnant, or undergoing an invasive urologic procedure.    Susceptibility        Pseudomonas aeruginosa MDRO      ASPEN      Amikacin Susceptible      Cefepime Susceptible      Ceftazidime Susceptible  [1]       Ciprofloxacin Resistant      Imipenem Resistant      Levofloxacin Resistant      Meropenem Resistant      Piperacillin + Tazobactam Resistant      Tobramycin Resistant                   [1]  Appended report. These results have been appended to a previously preliminary verified report.                Susceptibility Comments       Pseudomonas aeruginosa MDRO    For MDR Pseudomonas infections, susceptibility results may not correlate to clinical outcomes. Please consider infectious disease consult.               Blood Culture - Blood, Arm, Left [[id number]]  (Normal) Collected: 07/05/25 1731    Lab Status: Final result Specimen: Blood from Arm, Left Updated: 07/10/25 1745     Blood Culture No growth at 5 days    Blood Culture - Blood, Arm, Left [[id number]]  (Normal) Collected: 07/05/25 1731    Lab Status: Final result Specimen: Blood from Arm, Left Updated: 07/10/25 1930     Blood Culture No growth at 5 days    Respiratory Panel PCR w/COVID-19(SARS-CoV-2) TOSIN/JOVAN/ANTONIO/PAD/COR/CHARLENE In-House, NP Swab in UTM/VTM, 2 HR TAT - Swab, Nasopharynx [[id number]]  (Normal) Collected: 07/05/25 1725    Lab Status: Final result Specimen: Swab from Nasopharynx Updated: 07/05/25 1829     ADENOVIRUS, PCR Not Detected     Coronavirus 229E Not Detected     Coronavirus HKU1 Not Detected     Coronavirus NL63 Not Detected     Coronavirus OC43 Not Detected     COVID19 Not Detected     Human Metapneumovirus Not Detected     Human Rhinovirus/Enterovirus Not Detected     Influenza A PCR Not Detected     Influenza B PCR Not Detected     Parainfluenza Virus 1 Not Detected      Parainfluenza Virus 2 Not Detected     Parainfluenza Virus 3 Not Detected     Parainfluenza Virus 4 Not Detected     RSV, PCR Not Detected     Bordetella pertussis pcr Not Detected     Bordetella parapertussis PCR Not Detected     Chlamydophila pneumoniae PCR Not Detected     Mycoplasma pneumo by PCR Not Detected    Narrative:      In the setting of a positive respiratory panel with a viral infection PLUS a negative procalcitonin without other underlying concern for bacterial infection, consider observing off antibiotics or discontinuation of antibiotics and continue supportive care. If the respiratory panel is positive for atypical bacterial infection (Bordetella pertussis, Chlamydophila pneumoniae, or Mycoplasma pneumoniae), consider antibiotic de-escalation to target atypical bacterial infection.            Hospital Course: The patient was admitted for medical management.  On admission, temperature was above 38 Celsius, he had leukocytosis of 20,000, and lactic acid was slightly elevated at 2.  He was diagnosed with severe sepsis, secondary to urinary tract infection.  Urinalysis showed 4+ bacteria too numerous to count white blood cells and red blood cells, large presence of leukocytes and positive nitrites.  Patient was started on empiric antibiotic treatment.  Initially with ceftriaxone IV.  Patient on hemodialysis Monday Wednesday and Friday, with history of end-stage renal disease.  He continued hemodialysis as per nephrology recommendations. The patient had no additional fevers during the hospital stay.  He reported no abdominal pain, no back pain.  He was able to tolerate diet.  Confusion resolved.  Blood cultures were negative after 5 days.  Urine culture showed growth of Pseudomonas aeruginosa MDRO, sensitive to cefepime.  Antibiotic treatment was changed to cefepime.  The patient received a total of 7 days of antibiotic treatment.  Regarding his antihypertensive medications, he continued nifedipine XL,  "hydralazine, carvedilol, and valsartan.  Due to adequate progress, the patient was scheduled for his regular hemodialysis 7/11/2025, and after this he received the last dose of antibiotics, and was discharged to prior setting, [facility].      Physical Exam on Discharge:  /47 (BP Location: Left arm, Patient Position: Lying)   Pulse 75   Temp 97.4 °F (36.3 °C) (Oral)   Resp 18   Ht 182.9 cm (72\")   Wt 54.1 kg (119 lb 4.8 oz)   SpO2 95%   BMI 16.18 kg/m²   Physical Exam  Constitutional:       Appearance: Alert and oriented x 3, no respiratory distress; chronically ill-appearing.  Malnourished.  HENT:      Head: Normocephalic and atraumatic.      Nose: Nose normal.      Mouth/Throat:      Mouth: Mucous membranes are moist.      Pharynx: Oropharynx is clear.   Eyes:      Extraocular Movements: Extraocular movements intact.      Conjunctiva/sclera: Conjunctivae normal.      Pupils: Pupils are equal, round  Cardiovascular:      Rate and Rhythm: Normal rate and regular rhythm.      Pulses: Normal pulses.   Pulmonary:      Effort: No respiratory distress.      Breath sounds: Normal breath sounds.  Abdominal:      General: Abdomen is flat. Bowel sounds are normal.      Palpations: Abdomen is soft.   Extremities:  No lower extremity edema.  Status post right above-the-knee amputation  Skin:     Capillary Refill: Capillary refill takes less than 2 seconds.      Coloration: Skin is not jaundiced.   Neurological:      General: No focal deficit present.      Mental Status: Patient is alert, oriented to place time and person.    Condition on Discharge: Stable    Discharge Disposition:  Skilled Nursing Facility ([place] - External)    Discharge Medications:     Discharge Medications        Changes to Medications        Instructions Start Date   oxyCODONE-acetaminophen 5-325 MG per tablet  Commonly known as: PERCOCET  What changed: when to take this   1 tablet, Oral, Every 8 Hours PRN         "     Continue These Medications        Instructions Start Date   acetaminophen 325 MG tablet  Commonly known as: TYLENOL   650 mg, Oral, Every 4 Hours PRN      albuterol sulfate  (90 Base) MCG/ACT inhaler  Commonly known as: PROVENTIL HFA;VENTOLIN HFA;PROAIR HFA   2 puffs, Inhalation, Every 6 Hours PRN      aspirin 81 MG chewable tablet   81 mg, Oral, Daily      atorvastatin 10 MG tablet  Commonly known as: LIPITOR   10 mg, Oral, Daily      bisacodyl 10 MG suppository  Commonly known as: DULCOLAX   10 mg, Rectal, Daily PRN      Breztri Aerosphere 160-9-4.8 MCG/ACT aerosol inhaler  Generic drug: Budeson-Glycopyrrol-Formoterol   2 puffs, Inhalation, 2 Times Daily      calcitriol 0.5 MCG capsule  Commonly known as: ROCALTROL   0.5 mcg, Oral, Daily      carvedilol 12.5 MG tablet  Commonly known as: COREG   12.5 mg, Oral, 2 Times Daily With Meals      clopidogrel 75 MG tablet  Commonly known as: PLAVIX   75 mg, Oral, Daily      desoximetasone 0.25 % cream  Commonly known as: TOPICORT   1 Application, Topical, 2 Times Daily PRN, irritation      docusate sodium 100 MG capsule  Commonly known as: COLACE   100 mg, Oral, 3 Times Daily PRN      esomeprazole 20 MG capsule  Commonly known as: nexIUM   20 mg, Oral, Every Morning Before Breakfast      fluticasone 50 MCG/ACT nasal spray  Commonly known as: FLONASE   2 sprays, Nasal, Daily PRN      gabapentin 300 MG capsule  Commonly known as: NEURONTIN   300 mg, Oral, Nightly      hydrALAZINE 100 MG tablet  Commonly known as: APRESOLINE   50 mg, Oral, 3 Times Daily      iron polysaccharides 150 MG capsule  Commonly known as: NIFEREX   150 mg, Oral, Daily      isosorbide dinitrate 10 MG tablet  Commonly known as: ISORDIL   10 mg, Oral, 3 Times Daily (Nitrates)      Jardiance 10 MG tablet tablet  Generic drug: empagliflozin   10 mg, Oral, Daily      levothyroxine 100 MCG tablet  Commonly known as: Synthroid   100 mcg, Oral, Every Early Morning      magnesium chloride-calcium   MG tablet delayed-release ec tablet  Commonly known as: SLOW MAGNESIUM/CALCIUM   1 tablet, Oral, Daily      montelukast 10 MG tablet  Commonly known as: SINGULAIR   10 mg, Oral, Nightly      naloxone 4 MG/0.1ML nasal spray  Commonly known as: NARCAN   Call 911. Don't prime. [place] in 1 nostril for overdose. Repeat in 2-3 minutes in other nostril if no or minimal breathing/responsiveness.      NIFEdipine CC 60 MG 24 hr tablet  Commonly known as: ADALAT CC   120 mg, Oral, Daily      polyethylene glycol 17 g packet  Commonly known as: MIRALAX   17 g, Daily      PreserVision/Lutein capsule   1 capsule, Oral, 2 times daily      senna 8.6 MG tablet  Commonly known as: SENOKOT   2 tablets, Oral, Daily PRN      tamsulosin 0.4 MG capsule 24 hr capsule  Commonly known as: FLOMAX   1 capsule, Oral, Nightly      valsartan 320 MG tablet  Commonly known as: DIOVAN   320 mg, Oral, Daily      vitamin D 1.25 MG (54260 UT) capsule capsule  Commonly known as: ERGOCALCIFEROL   50,000 Units, Oral, Weekly, Mondays             Stop These Medications      ALPRAZolam 0.5 MG tablet  Commonly known as: XANAX                Discharge Diet:   Diet Instructions       Diet: Renal Diets; Low Sodium (2-3g), Low Potassium, Low Phosphorus; Regular (IDDSI 7); Thin (IDDSI 0)      Discharge Diet: Renal Diets    Renal Diet:  Low Sodium (2-3g)  Low Potassium  Low Phosphorus       Texture: Regular (IDDSI 7)    Fluid Consistency: Thin (IDDSI 0)            Activity at Discharge: resume prior activity    Follow-up Appointments:   Future Appointments   Date Time Provider Department Center   7/24/2025  8:00 AM P[facility] US NIVAS CART 1  [facility] NIVAS PAD   7/24/2025 10:45 AM Melanie Ayala APRN [facility] VS [facility] PAD   9/2/2025 11:00 AM PAD PET RM  PAD PETCA None   9/23/2025 10:15 AM Rip Mcguire DO [facility] CD [facility] PAD   11/4/2025 10:00 AM [facility] US NIVAS CART 1  [facility] NIVAS PAD   11/4/2025 11:00 AM P[facility] US NIVAS CART 1  [facility] NIVAS PAD   11/6/2025 10:15 AM  Elena Jon APRN [facility] VS [facility] PAD       Test Results Pending at Discharge: None     Electronically signed by Ramon Aguilera MD, 07/11/25, 09:42 CDT.    Time: 45 minutes.

## 2025-07-15 ENCOUNTER — TELEPHONE (OUTPATIENT)
Dept: VASCULAR SURGERY | Facility: CLINIC | Age: 75
End: 2025-07-15
Payer: MEDICARE

## 2025-07-15 NOTE — TELEPHONE ENCOUNTER
Spoke with Kathleen and explained he should not use heated blanket at this time and she has voiced understanding.

## 2025-07-15 NOTE — TELEPHONE ENCOUNTER
Caller: jesi chavez    Relationship: Emergency Contact    Best call back number: [phone]    What is the best time to reach you: ANY    Who are you requesting to speak with (clinical staff, provider,  specific staff member): CLINICAL    Do you know the name of the person who called: NA    What was the call regarding: PT REQUESTING TO KNOW IF HE CAN USE ELECTRIC BLANKET- DR MCGILL SAID HE COULDN'T WHILE IN HOSPITAL IS CURIOUS IF HE'S OKAY TO USE IT NOW

## 2025-07-17 ENCOUNTER — HOSPITAL ENCOUNTER (EMERGENCY)
Facility: HOSPITAL | Age: 75
Discharge: HOME | End: 2025-07-18
Attending: STUDENT IN AN ORGANIZED HEALTH CARE EDUCATION/TRAINING PROGRAM
Payer: MEDICARE

## 2025-07-17 ENCOUNTER — PATIENT OUTREACH (OUTPATIENT)
Dept: CASE MANAGEMENT | Facility: OTHER | Age: 75
End: 2025-07-17
Payer: MEDICARE

## 2025-07-17 ENCOUNTER — APPOINTMENT (OUTPATIENT)
Dept: CT IMAGING | Facility: HOSPITAL | Age: 75
End: 2025-07-17
Payer: MEDICARE

## 2025-07-17 ENCOUNTER — APPOINTMENT (OUTPATIENT)
Dept: GENERAL RADIOLOGY | Facility: HOSPITAL | Age: 75
End: 2025-07-17
Payer: MEDICARE

## 2025-07-17 DIAGNOSIS — J18.9 PNEUMONIA DUE TO INFECTIOUS ORGANISM, UNSPECIFIED LATERALITY, UNSPECIFIED PART OF LUNG: Primary | ICD-10-CM

## 2025-07-17 DIAGNOSIS — M79.672 FOOT PAIN, LEFT: ICD-10-CM

## 2025-07-17 LAB
ALBUMIN SERPL-MCNC: 3 G/DL (ref 3.5–5.2)
ALBUMIN/GLOB SERPL: 1.1 G/DL
ALP SERPL-CCNC: 246 U/L (ref 39–117)
ALT SERPL W P-5'-P-CCNC: 28 U/L (ref 1–41)
ANION GAP SERPL CALCULATED.3IONS-SCNC: 13 MMOL/L (ref 5–15)
APTT PPP: 31.9 SECONDS (ref 24.5–36)
AST SERPL-CCNC: 24 U/L (ref 1–40)
BASOPHILS # BLD AUTO: 0.05 10*3/MM3 (ref 0–0.2)
BASOPHILS NFR BLD AUTO: 0.5 % (ref 0–1.5)
BILIRUB SERPL-MCNC: 0.3 MG/DL (ref 0–1.2)
BUN SERPL-MCNC: 37.5 MG/DL (ref 8–23)
BUN/CREAT SERPL: 13.1 (ref 7–25)
CALCIUM SPEC-SCNC: 9.3 MG/DL (ref 8.6–10.5)
CHLORIDE SERPL-SCNC: 97 MMOL/L (ref 98–107)
CO2 SERPL-SCNC: 27 MMOL/L (ref 22–29)
CREAT SERPL-MCNC: 2.86 MG/DL (ref 0.76–1.27)
DEPRECATED RDW RBC AUTO: 66.8 FL (ref 37–54)
EGFRCR SERPLBLD CKD-EPI 2021: 22 ML/MIN/1.73
EOSINOPHIL # BLD AUTO: 0.24 10*3/MM3 (ref 0–0.4)
EOSINOPHIL NFR BLD AUTO: 2.6 % (ref 0.3–6.2)
ERYTHROCYTE [DISTWIDTH] IN BLOOD BY AUTOMATED COUNT: 18.6 % (ref 12.3–15.4)
GLOBULIN UR ELPH-MCNC: 2.7 GM/DL
GLUCOSE SERPL-MCNC: 136 MG/DL (ref 65–99)
HCT VFR BLD AUTO: 27.2 % (ref 37.5–51)
HGB BLD-MCNC: 8.8 G/DL (ref 13–17.7)
IMM GRANULOCYTES # BLD AUTO: 0.09 10*3/MM3 (ref 0–0.05)
IMM GRANULOCYTES NFR BLD AUTO: 1 % (ref 0–0.5)
INR PPP: 1.24 (ref 0.91–1.09)
LYMPHOCYTES # BLD AUTO: 1.17 10*3/MM3 (ref 0.7–3.1)
LYMPHOCYTES NFR BLD AUTO: 12.7 % (ref 19.6–45.3)
MCH RBC QN AUTO: 32.6 PG (ref 26.6–33)
MCHC RBC AUTO-ENTMCNC: 32.4 G/DL (ref 31.5–35.7)
MCV RBC AUTO: 100.7 FL (ref 79–97)
MONOCYTES # BLD AUTO: 0.85 10*3/MM3 (ref 0.1–0.9)
MONOCYTES NFR BLD AUTO: 9.3 % (ref 5–12)
NEUTROPHILS NFR BLD AUTO: 6.78 10*3/MM3 (ref 1.7–7)
NEUTROPHILS NFR BLD AUTO: 73.9 % (ref 42.7–76)
NRBC BLD AUTO-RTO: 0 /100 WBC (ref 0–0.2)
PLATELET # BLD AUTO: 137 10*3/MM3 (ref 140–450)
PMV BLD AUTO: 9.6 FL (ref 6–12)
POTASSIUM SERPL-SCNC: 3.7 MMOL/L (ref 3.5–5.2)
PROT SERPL-MCNC: 5.7 G/DL (ref 6–8.5)
PROTHROMBIN TIME: 16.3 SECONDS (ref 11.8–14.8)
RBC # BLD AUTO: 2.7 10*6/MM3 (ref 4.14–5.8)
SODIUM SERPL-SCNC: 137 MMOL/L (ref 136–145)
WBC NRBC COR # BLD AUTO: 9.18 10*3/MM3 (ref 3.4–10.8)

## 2025-07-17 PROCEDURE — 25010000002 CEFTRIAXONE PER 250 MG: Performed by: STUDENT IN AN ORGANIZED HEALTH CARE EDUCATION/TRAINING PROGRAM

## 2025-07-17 PROCEDURE — 85730 THROMBOPLASTIN TIME PARTIAL: CPT | Performed by: STUDENT IN AN ORGANIZED HEALTH CARE EDUCATION/TRAINING PROGRAM

## 2025-07-17 PROCEDURE — 94761 N-INVAS EAR/PLS OXIMETRY MLT: CPT

## 2025-07-17 PROCEDURE — 94640 AIRWAY INHALATION TREATMENT: CPT

## 2025-07-17 PROCEDURE — 25510000001 IOPAMIDOL PER 1 ML: Performed by: STUDENT IN AN ORGANIZED HEALTH CARE EDUCATION/TRAINING PROGRAM

## 2025-07-17 PROCEDURE — 94799 UNLISTED PULMONARY SVC/PX: CPT

## 2025-07-17 PROCEDURE — 94664 DEMO&/EVAL PT USE INHALER: CPT

## 2025-07-17 PROCEDURE — 80053 COMPREHEN METABOLIC PANEL: CPT | Performed by: STUDENT IN AN ORGANIZED HEALTH CARE EDUCATION/TRAINING PROGRAM

## 2025-07-17 PROCEDURE — 99285 EMERGENCY DEPT VISIT HI MDM: CPT | Performed by: STUDENT IN AN ORGANIZED HEALTH CARE EDUCATION/TRAINING PROGRAM

## 2025-07-17 PROCEDURE — 85025 COMPLETE CBC W/AUTO DIFF WBC: CPT | Performed by: STUDENT IN AN ORGANIZED HEALTH CARE EDUCATION/TRAINING PROGRAM

## 2025-07-17 PROCEDURE — 85610 PROTHROMBIN TIME: CPT | Performed by: STUDENT IN AN ORGANIZED HEALTH CARE EDUCATION/TRAINING PROGRAM

## 2025-07-17 PROCEDURE — 73706 CT ANGIO LWR EXTR W/O&W/DYE: CPT

## 2025-07-17 PROCEDURE — 63710000001 DEXAMETHASONE PER 0.25 MG: Performed by: STUDENT IN AN ORGANIZED HEALTH CARE EDUCATION/TRAINING PROGRAM

## 2025-07-17 PROCEDURE — 96365 THER/PROPH/DIAG IV INF INIT: CPT

## 2025-07-17 PROCEDURE — 71045 X-RAY EXAM CHEST 1 VIEW: CPT

## 2025-07-17 RX ORDER — DEXAMETHASONE 4 MG/1
10 TABLET ORAL ONCE
Status: COMPLETED | OUTPATIENT
Start: 2025-07-17 | End: 2025-07-17

## 2025-07-17 RX ORDER — IOPAMIDOL 755 MG/ML
100 INJECTION, SOLUTION INTRAVASCULAR
Status: COMPLETED | OUTPATIENT
Start: 2025-07-17 | End: 2025-07-17

## 2025-07-17 RX ORDER — IPRATROPIUM BROMIDE AND ALBUTEROL SULFATE 2.5; .5 MG/3ML; MG/3ML
3 SOLUTION RESPIRATORY (INHALATION) ONCE
Status: COMPLETED | OUTPATIENT
Start: 2025-07-17 | End: 2025-07-17

## 2025-07-17 RX ADMIN — CEFTRIAXONE SODIUM 1000 MG: 1 INJECTION, POWDER, FOR SOLUTION INTRAMUSCULAR; INTRAVENOUS at 22:14

## 2025-07-17 RX ADMIN — IOPAMIDOL 100 ML: 755 INJECTION, SOLUTION INTRAVENOUS at 22:57

## 2025-07-17 RX ADMIN — IPRATROPIUM BROMIDE AND ALBUTEROL SULFATE 3 ML: .5; 3 SOLUTION RESPIRATORY (INHALATION) at 21:44

## 2025-07-17 RX ADMIN — DEXAMETHASONE 10 MG: 4 TABLET ORAL at 21:50

## 2025-07-17 NOTE — OUTREACH NOTE
AMBULATORY CASE MANAGEMENT NOTE    Names and Relationships of Patient/Support Persons: Contact: [facility]; Relationship: Nursing Home -     SNF Follow-up    Questions/Answers      Flowsheet Row Responses   Acute Facility Discharged From [facility]   Acute Discharge Date 06/05/25   Acute Facility Diagnoses ESRD on dialysis, CHF, arterial occlusion, and CLL   Name of the Skilled Nursing Facility? [facility]   Purpose of SNF Admission PT, SN, OT   Estimated length of stay for the patient? TBD   Who is the insurance provider or payor of patient stay? Medicare   Progression of Patient? has had readmissions to [facility]        Per TONY Moreno, at [facility].        Malu PINA  Ambulatory Case Management    7/17/2025, 10:51 CDT

## 2025-07-18 VITALS
BODY MASS INDEX: 16.61 KG/M2 | DIASTOLIC BLOOD PRESSURE: 56 MMHG | TEMPERATURE: 98.4 F | HEART RATE: 69 BPM | HEIGHT: 72 IN | WEIGHT: 122.6 LBS | OXYGEN SATURATION: 97 % | RESPIRATION RATE: 18 BRPM | SYSTOLIC BLOOD PRESSURE: 175 MMHG

## 2025-07-18 RX ORDER — PREDNISONE 50 MG/1
50 TABLET ORAL DAILY
Qty: 5 TABLET | Refills: 0 | Status: ON HOLD | OUTPATIENT
Start: 2025-07-18

## 2025-07-18 RX ORDER — BENZONATATE 200 MG/1
200 CAPSULE ORAL 3 TIMES DAILY PRN
Qty: 15 CAPSULE | Refills: 0 | Status: ON HOLD | OUTPATIENT
Start: 2025-07-18

## 2025-07-18 RX ORDER — AMOXICILLIN 500 MG/1
1000 CAPSULE ORAL 3 TIMES DAILY
Qty: 42 CAPSULE | Refills: 0 | Status: ON HOLD | OUTPATIENT
Start: 2025-07-18 | End: 2025-07-28

## 2025-07-18 NOTE — DISCHARGE INSTRUCTIONS
Please take medications as it was prescribed.  Please return immediately to the emergency room with shortness of breath, worsening symptoms.  Please follow-up with your vascular surgeon regarding left foot pain for reevaluation.  Please return immediately to the emergency room with worsening pain, fever, skin color changes such as your skin becoming blue or red, numbness, not able to move your foot or toes, numbness or weakness

## 2025-07-18 NOTE — ED PROVIDER NOTES
"Subjective   History of Present Illness  The patient presents to the ED with complaints of cough and congestion that started this morning. Patient reports using an inhaler earlier today with some relief. Patient denies coughing up any sputum and states \"it's not that serious.\" Patient denies fever, chills, or body aches. Patient has a cardiac history with three bypasses but denies history of COPD or congestive heart failure. Patient denies being a current or former smoker. Patient denies any sick contacts. Patient has three medication allergies (specific medications not identified). Patient denies abdominal pain, diarrhea, nausea, or vomiting. Patient has a lower extremity amputation.        Review of Systems    Past Medical History:   Diagnosis Date    3-vessel CAD 08/11/2020    Allergic rhinitis     Anemia     Anxiety disorder 04/27/2020    Arthritis     Asymmetrical sensorineural hearing loss 06/28/2017    Atherosclerosis of native artery of both lower extremities with intermittent claudication 07/18/2019    Avascular necrosis of femoral head, left 07/11/2020    right hip after surgery    Carotid stenosis     Chronic mucoid otitis media     Chronic rhinitis     COPD (chronic obstructive pulmonary disease)     Coronary artery disease     HEART BYPASS 2004    Crohn's disease of large intestine with other complication 07/30/2020    Chronic diarrhea Colonoscopy July 2020 revealed mild patchy scattered hemosiderin staining with inflammation more so in rectosigmoid area.  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    Iron deficiency anemia 07/14/2020    Mixed " hearing loss of left ear     PAD (peripheral artery disease) 08/11/2020    Perianal abscess     Pernicious anemia 08/17/2020    took shots but never diagnosed with b12 deficiency    Personal history of alcoholism 08/11/2020    quit drinking in 2013    Prostatic hypertrophy 08/11/2020    Sensorineural hearing loss     Sepsis with acute renal failure 09/15/2020    Shortness of breath 05/27/2021    Tinnitus     Ventricular tachycardia, nonsustained 07/14/2020    Weight loss 07/11/2020       Allergies   Allergen Reactions    Ondansetron Anaphylaxis and GI Intolerance    Zofran [Ondansetron Hcl] Anaphylaxis    Lortab [Hydrocodone-Acetaminophen] Other (See Comments) and Hallucinations     CLOSTROPHOBIC    Allopurinol Other (See Comments)     Pain on right side       Past Surgical History:   Procedure Laterality Date    ABOVE KNEE AMPUTATION Right 6/24/2025    Procedure: right above knee amputation;  Surgeon: Blayne Zabala MD;  Location:  [facility] OR;  Service: Vascular;  Laterality: Right;    AORTOGRAM Right 4/25/2025    Procedure: RIGHT LOWER EXTREMITY ANGIOGRAM, SHOCKWAVE LITHOTRIPSY, BALLOON ANGIOPLASTY, MYNX CLOSURE;  Surgeon: Gil Pineda DO;  Location:  [facility]BR[facility] OR;  Service: Vascular;  Laterality: Right;    AORTOGRAM Left 5/22/2025    Procedure: LEFT LOWER EXTREMITY ANGIOGRAM, SHOCKWAVE LITHOTRIPSY, BALLOON ANGIOPLASTY, STENT PLACEMENT, MYNX CLOSURE;  Surgeon: Blayne Zabala MD;  Location:  [facility];  Service: Vascular;  Laterality: Left;    AORTOGRAM Right 5/30/2025    Procedure: AORTOILIAC ANGIOGRAM WITH LEFT LOWER EXTREMITY ANGIOGRAM;  Surgeon: Gil Pineda DO;  Location:  [facility];  Service: Vascular;  Laterality: Right;    AORTOGRAM Right 6/20/2025    Procedure: right lower extremity arteriogram, shockwave lithotripsy, balloon angioplasty, mynx closue;  Surgeon: Blayne Zabala MD;  Location:  [facility];  Service: Vascular;  Laterality: Right;    ARTERY SURGERY  2021     right side on neck    CAROTID ENDARTERECTOMY Right 05/10/2021    Procedure: RIGHT CAROTID ENDARTERECTOMY WITH EEG;  Surgeon: Gil Pineda DO;  Location: [facility] HYBRID OR 12;  Service: Vascular;  Laterality: Right;    COLONOSCOPY N/A 07/02/2020    Procedure: COLONOSCOPY WITH ANESTHESIA;  Surgeon: Adrien Brewster MD;  Location: [facility] ENDOSCOPY;  Service: Gastroenterology;  Laterality: N/A;  pre op: diarrhea  post op: polyps  PCP: Joe Velasco MD    COLONOSCOPY N/A 10/13/2020    Procedure: COLONOSCOPY WITH ANESTHESIA;  Surgeon: Adrien Brewster MD;  Location: [facility] ENDOSCOPY;  Service: Gastroenterology;  Laterality: N/A;  Pre: Chronic Diarrhea, Crohn's  Post: AVM  Dr. Neftali Velasco  CO2 Inflation Used    COLONOSCOPY N/A 12/08/2023    Procedure: COLONOSCOPY WITH ANESTHESIA;  Surgeon: Adrien Brewster MD;  Location: [facility] ENDOSCOPY;  Service: Gastroenterology;  Laterality: N/A;  pre chrone's disease  post sub optimal prep, polyp, chrone's      CORONARY ARTERY BYPASS GRAFT  2003    x3    ENDOSCOPY N/A 11/02/2021    Procedure: ESOPHAGOGASTRODUODENOSCOPY WITH ANESTHESIA;  Surgeon: Bridger Bell MD;  Location: [facility] ENDOSCOPY;  Service: Gastroenterology;  Laterality: N/A;  pre anemia;gi bleed  post  gi bleed;schatski ring  Dr. ERIC Velasco    ENDOSCOPY N/A 10/10/2023    Procedure: ESOPHAGOGASTRODUODENOSCOPY WITH ANESTHESIA;  Surgeon: Adrien Brewster MD;  Location: [facility] ENDOSCOPY;  Service: Gastroenterology;  Laterality: N/A;  preop; anemia  postop esophagitis ; R/O barretts   PCP Randall Beata    EYE SURGERY Bilateral     catorac    INCISION AND DRAINAGE PERIRECTAL ABSCESS N/A 03/03/2017    Procedure: INCISION AND DRAINAGE OF JEET ANAL ABSCESS;  Surgeon: Lynette Smith MD;  Location: [facility] OR;  Service:     INGUINAL HERNIA REPAIR Bilateral 06/27/2023    Procedure: INGUINAL HERNIA BILATERAL REPAIR LAPAROSCOPIC WITH DAVINCI ROBOT WITH MESH;  Surgeon: Tahira Rivera MD;  Location:   [facility] OR;  Service: Robotics - DaVinci;  Laterality: Bilateral;    INSERTION HEMODIALYSIS CATHETER N/A 2025    Procedure: HEMODIALYSIS CATHETER PLACEMENT;  Surgeon: Gil Pineda DO;  Location:  [facility];  Service: Vascular;  Laterality: N/A;    MYRINGOTOMY W/ TUBES Left 2017    06/10/2016    TONSILLECTOMY      TOTAL HIP ARTHROPLASTY Right        Family History   Problem Relation Age of Onset    Breast cancer Mother     Dementia Father     Glaucoma Father     No Known Problems Daughter     Colon polyps Neg Hx     Colon cancer Neg Hx        Social History     Socioeconomic History    Marital status:    Tobacco Use    Smoking status: Former     Current packs/day: 0.00     Average packs/day: 0.5 packs/day for 25.8 years (12.9 ttl pk-yrs)     Types: Cigarettes     Start date:      Quit date: 10/13/2013     Years since quittin.7     Passive exposure: Past    Smokeless tobacco: Never    Tobacco comments:     quit    Vaping Use    Vaping status: Former    Substances: Nicotine    Devices: Pre-filled or refillable cartridge   Substance and Sexual Activity    Alcohol use: Not Currently    Drug use: No    Sexual activity: Not Currently     Partners: Female           Objective   Physical Exam    Constitutional:  General: Patient is not in acute distress.  Appearance: Patient is not diaphoretic.    HENT:  Head: Normocephalic and atraumatic.  Right Ear: External ear normal.  Left Ear: External ear normal.  Nose: Nose normal.    Eyes:  General: No scleral icterus.  Conjunctiva/sclera: Conjunctivae normal.    Cardiovascular:  Rate and Rhythm: Normal rate and regular rhythm.  Heart sounds: No friction rub.    Pulmonary:  Effort: Pulmonary effort is normal. No respiratory distress.  Breath sounds: No stridor. **Bilateral bronchial sounds and expiratory wheezing present. Lot of congestion noted.**    Abdominal:  Palpations: Abdomen is soft.  Tenderness: There is no abdominal  tenderness. There is no guarding or rebound.    Musculoskeletal:  General: No deformity. **Lower extremity amputation noted.**    Skin:  General: Skin is warm and dry. No rashes present. Normal color. Normal turgor.    Neurological:  General: No focal deficit present.  Mental Status: Alert and oriented.    Procedures           ED Course  ED Course as of 07/18/25 0334   u Jul 17, 2025   2132 Patient has been having left foot pain today.  He has a history of peripheral vascular disease requiring multiple surgeries.  Right BKA history.  Will go ahead and Doppler the foot [SG]   2150 Had a discussion adept with family and patient.  He did not have DP pulses, however PT pulses were present.  The patient is concerned that this might be vascular related.  He has good color to his toes, is warm, not cold.  There is no pain out of proportion.  Through shared medical excision the patient will like to have imaging which would include a CT angiogram.  He has a history of end-stage renal disease he is scheduled for dialysis tomorrow.  Pending CTA, blood work. [SG]   2150 The x-ray of the chest was positive for possible pneumonia we will give him a dose of ceftriaxone in the emergency room.  He is not hypoxic or having respiratory distress [SG]   2252 Hemoglobin is at baseline.  The patient is a dialysis patient hence chemistry numbers. [SG]   Fri Jul 18, 2025   0142 The patient is stable waiting for imaging, left foot remains warm, no concerns for delirium at this time. [SG]   0228 The foot remains with normal color, no concerns for cyanosis or cold foot.  CTA was done, we discussed findings including severe and moderate stenosis.  However circulation reach in the foot.  As such is no clinical indication for emergent vascular consultation or admission, or start the patient on an anticoagulation such as heparin.  The patient and the family members are reliable, we discussed strict return precautions for worsening pain,  numbness, weakness of the foot as well as skin color changes such as significant redness or the patient becoming blue as well as cold. I encouraged the PT to follow up with his vascular surgeon. We discussed treatment for pneumonia. No clinical criteria for admission he is not hypoxic, no AMS, no respiratory distress, or tachypnea.  [SG]   0233 Grade B recommendation for community-acquired pneumonia, amoxicillin 1 g 3 times daily was ordered. [SG]      ED Course User Index  [SG] Bentley Luis MD                                                       Medical Decision Making  Patient presents with cough and congestion consistent with bronchitis, possibly progressed from an upper respiratory infection.    Pulse oximetry was 99% on room air, which is normal. Vital signs were stable with no fever. Blood pressure was elevated, but patient has a known history of hypertension.    Physical examination revealed bilateral bronchial sounds and expiratory wheezing, consistent with bronchitis. No signs of respiratory distress, tachypnea, or hypoxia were noted.    Imaging studies: Chest X-ray was ordered to rule out pneumonia.    Respiratory panel was ordered for further evaluation.    Treatment plan includes DuoNeb nebulizer treatment for bronchospasm, oral dexamethasone for anti-inflammatory effect, and consideration of antibiotics pending imaging results. Tessalon Pearls were also planned for cough suppression.    No blood work was deemed necessary as it would not alter the management plan. There were no clinical indications for blood work, no leg swelling, no respiratory distress, no tachypnea, and no hypoxia.    Medical decision making focused on treating presumed bronchitis while ruling out pneumonia. Patient will be treated empirically for possible pneumonia pending chest X-ray results.    Problems Addressed:  Foot pain, left: complicated acute illness or injury  Pneumonia due to infectious organism, unspecified  laterality, unspecified part of lung: complicated acute illness or injury    Amount and/or Complexity of Data Reviewed  Labs: ordered.  Radiology: ordered.    Risk  Prescription drug management.        Final diagnoses:   Pneumonia due to infectious organism, unspecified laterality, unspecified part of lung   Foot pain, left       ED Disposition  ED Disposition       ED Disposition   Discharge    Condition   Stable    Comment   --               Nathan Zimmer MD  [address] [place] [address]  [phone]    Schedule an appointment as soon as possible for a visit       Blayne Zabala MD  [address], [address]  [facility] [address]  [phone]    Schedule an appointment as soon as possible for a visit            Medication List        New Prescriptions      amoxicillin 500 MG capsule  Commonly known as: AMOXIL  Take 2 capsules by mouth 3 (Three) Times a Day.     benzonatate 200 MG capsule  Commonly known as: TESSALON  Take 1 capsule by mouth 3 (Three) Times a Day As Needed for Cough.     predniSONE 50 MG tablet  Commonly known as: DELTASONE  Take 1 tablet by mouth Daily.               Where to Get Your Medications        These medications were sent to [id number] - [place], [place] - [address] - [phone]  - [phone] FX  [address], [facility] [address]      Phone: [phone]   amoxicillin 500 MG capsule  benzonatate 200 MG capsule  predniSONE 50 MG tablet            Bentley Luis MD  07/18/25 0334

## 2025-07-23 ENCOUNTER — TELEPHONE (OUTPATIENT)
Dept: VASCULAR SURGERY | Facility: CLINIC | Age: 75
End: 2025-07-23
Payer: MEDICARE

## 2025-07-23 NOTE — TELEPHONE ENCOUNTER
SPOKE WITH PATIENTS DAUGHTER ABOUT REMINDER OF APPOINTMENT TOMORROW AND TESTING BEFORE. DAUGHTER ASKED US WHEN SHE SHOULD SCHEDULE PATS BUS TO PICK HIM UP AFTER APPOINTMENT WITH [facility]LLA, TOLD THEM 10

## 2025-07-24 ENCOUNTER — HOSPITAL ENCOUNTER (OUTPATIENT)
Dept: ULTRASOUND IMAGING | Facility: HOSPITAL | Age: 75
Discharge: HOME | End: 2025-07-24
Admitting: NURSE PRACTITIONER
Payer: MEDICARE

## 2025-07-24 ENCOUNTER — OFFICE VISIT (OUTPATIENT)
Dept: VASCULAR SURGERY | Facility: CLINIC | Age: 75
End: 2025-07-24
Payer: MEDICARE

## 2025-07-24 VITALS
HEIGHT: 72 IN | WEIGHT: 120 LBS | BODY MASS INDEX: 16.25 KG/M2 | OXYGEN SATURATION: 98 % | DIASTOLIC BLOOD PRESSURE: 62 MMHG | HEART RATE: 70 BPM | SYSTOLIC BLOOD PRESSURE: 114 MMHG

## 2025-07-24 DIAGNOSIS — N18.6 ESRD (END STAGE RENAL DISEASE): ICD-10-CM

## 2025-07-24 DIAGNOSIS — E78.2 MIXED HYPERLIPIDEMIA: ICD-10-CM

## 2025-07-24 DIAGNOSIS — M79.89 ARM SWELLING: ICD-10-CM

## 2025-07-24 DIAGNOSIS — I73.9 PAD (PERIPHERAL ARTERY DISEASE): Primary | ICD-10-CM

## 2025-07-24 DIAGNOSIS — I10 ESSENTIAL HYPERTENSION: ICD-10-CM

## 2025-07-24 PROCEDURE — 93970 EXTREMITY STUDY: CPT

## 2025-07-25 NOTE — PROGRESS NOTES
07/24/2025      Nathan Zimmer MD  [address] [place] [address]      Ricardo Hugo  1950    Chief Complaint   Patient presents with    Post-op     1 month post op. Right AKA 6/24/25. No issues stated.        Dear Nathan Zimmer MD        Post-op    I had the pleasure of seeing your patient Ricardo Hugo in the office today.  As you recall, Ricardo Hugo is a 75 y.o.  male who you are currently following for routine health maintenance.  He has had multiple hospital stays since our last visit in April.  On 4/16/2025 he had a hemodialysis catheter placed by Dr. Pineda.  On 4/25/2025 he underwent aortic iliac angiogram with balloon angioplasty of bilateral external iliac arteries, crossing of a long SFA chronic total occlusion, angioplasty of the entire right superficial femoral artery by Dr. Pineda.  He reported on his next hospital admission that his right lower extremity pain has completely resolved.  He was admitted in May due to worsening left lower extremity rest pain.  On 5/22/2025 Dr. Zabala performed a left lower extremity angiogram with shockwave lithotripsy of the left external iliac artery and stenting.  And then on 5/30/2025 Dr. Pineda performed an aortic iliac angiogram balloon angioplasty of proximal SFA and below-knee popliteal artery chronic total occlusion, and angioplasty of proximal anterior tibial artery and entire peroneal tibial artery and tibioperoneal trunk due to the previous week he was having significant pain and coolness to his left lower extremity following the first intervention by Dr. Zabala.  On 6/19/2025 we were consulted due to worsening rest pain to his right lower extremity.  Dr. Zabala took him back to the OR and performed a right lower extremity angiogram on 6/20/2025.  On 6/24/2025 he underwent right AKA due to continued ischemic pain and development of wounds.  Patient previously underwent right carotid endarterectomy on 5/10/2021.  He currently  "denies any rest pain to his left lower extremity.  He does have phantom pain in his right AKA.  He is a dialysis patient and attends dialysis on Monday, Wednesday, and Friday.  He denies any strokelike symptoms.  He is maintained on aspirin, Plavix, and Lipitor.  He did have noninvasive testing performed, which I did review in office.          Review of Systems   Constitutional:  Negative for diaphoresis and fever.   HENT: Negative.     Eyes: Negative.    Respiratory: Negative.  Negative for shortness of breath and wheezing.    Cardiovascular: Negative.  Negative for chest pain and leg swelling.   Gastrointestinal: Negative.  Negative for abdominal pain.   Endocrine: Negative.    Genitourinary: Negative.    Musculoskeletal:  Positive for arthralgias and gait problem.   Skin: Negative.    Allergic/Immunologic: Negative.    Neurological:  Positive for weakness. Negative for dizziness.   Hematological: Negative.    Psychiatric/Behavioral:  Positive for confusion (At times).        /62   Pulse 70   Ht 182.9 cm (72\")   Wt 54.4 kg (120 lb)   SpO2 98%   BMI 16.27 kg/m²     Physical Exam  Vitals and nursing note reviewed.   Constitutional:       General: He is not in acute distress.     Appearance: Normal appearance. He is underweight. He is not diaphoretic.   HENT:      Head: Normocephalic. No right periorbital erythema or left periorbital erythema.      Nose: Nose normal.   Eyes:      General: No scleral icterus.     Pupils: Pupils are equal.   Cardiovascular:      Rate and Rhythm: Normal rate and regular rhythm.      Pulses: Normal pulses.      Heart sounds: Normal heart sounds. No murmur heard.     Comments: Right AKA incision healed  Pulmonary:      Effort: Pulmonary effort is normal. No respiratory distress.      Breath sounds: Normal breath sounds.   Abdominal:      General: Bowel sounds are normal. There is no distension.      Palpations: Abdomen is soft.      Tenderness: There is no abdominal tenderness. " There is no guarding.   Musculoskeletal:         General: No swelling or tenderness. Normal range of motion.      Cervical back: Normal range of motion and neck supple.      Right lower leg: No edema.      Left lower leg: No edema.   Feet:      Right foot:      Skin integrity: Skin integrity normal.      Left foot:      Skin integrity: Skin integrity normal.   Skin:     General: Skin is warm and dry.      Findings: No erythema or rash.   Neurological:      General: No focal deficit present.      Mental Status: He is alert and oriented to person, place, and time. Mental status is at baseline.      Cranial Nerves: No cranial nerve deficit.      Gait: Gait normal.   Psychiatric:         Attention and Perception: Attention normal.         Mood and Affect: Mood normal.         Behavior: Behavior normal.         Thought Content: Thought content normal.         Judgment: Judgment normal.       DIAGNOSTIC DATA  Narrative & Impression   History: Chronic renal failure.  Pre-operative planning for hemodialysis  access surgery.     Comments: Duplex ultrasound was used to image and map bilateral upper  extremity basilic and cephalic veins.      The right axillary vein is patent and measures 0.75 cm in diameter.  The  right cephalic vein is patent. The cephalic vein proximally and in the  upper arm were not visualized. In the mid arm 1.2 mm, and 1.5 mm  distally. The basilic vein is patent. The diameters are 3.7 mm  proximally, 2.9 mm in the mid arm, and 2.2 mm distally.      The left axillary vein is patent and measures 0.51 cm in diameter.   The  left cephalic vein is patent. The cephalic vein proximally and in the  upper arm are not visualized. In the mid arm noncompressible, and 0.5 mm  distally. The basilic vein is patent. The diameters are 2.2 mm  proximally, 2.5 mm in the mid arm, and 2.7 mm distally.      IMPRESSION:  Impression: Patent bilateral cephalic and basilic veins with  measurements as described in comments.            This report was signed and finalized on 7/24/2025 3:03 PM by Dr. Gil Pineda MD.     Patient Active Problem List   Diagnosis    Chronic rhinitis    Essential hypertension    Resistant hypertension    Chronic diarrhea    Symptomatic anemia    Wellness examination    Peripheral arterial disease    IHD (ischemic heart disease)    Carotid stenosis    Stenosis of right carotid artery    Carotid stenosis, right    Diastolic dysfunction    CKD (chronic kidney disease) stage 5    Anemia due to chronic kidney disease    Copper deficiency    Low iron     CLL (chronic lymphocytic leukemia)    Perforation of left tympanic membrane    Mixed hyperlipidemia    Systolic murmur    Eustachian tube dysfunction, bilateral    Sensorineural hearing loss (SNHL), bilateral    Anticoagulated    Nasal septal deviation    Hypertrophy of inferior nasal turbinate    Unilateral recurrent inguinal hernia without obstruction or gangrene    Bilateral inguinal hernia without obstruction or gangrene    Sleep difficulties    Dermatitis associated with moisture    Proteinuria    Chronic diastolic (congestive) heart failure    Bradycardia    History of pneumonia, current treatment with cefdinir    Abnormal TSH    ESRD (end stage renal disease) on dialysis    Preop testing    Anemia, multifactorial to include chronic kidney disease, iron deficiency and possible bleed    Acute pain of left lower extremity    Hyperkalemia    Arterial occlusion    Thrombocytopenia    Severe protein-calorie malnutrition    Chronic heart failure with preserved ejection fraction (HFpEF)    Vascular occlusion    COPD (chronic obstructive pulmonary disease)    Coronary artery disease involving native coronary artery of native heart without angina pectoris    Sepsis due to Pseudomonas species without acute organ dysfunction    UTI (urinary tract infection), bacterial         ICD-10-CM ICD-9-CM   1. PAD (peripheral artery disease)  I73.9 443.9   2. ESRD (end stage renal  disease)  N18.6 585.6   3. Essential hypertension  I10 401.9   4. Mixed hyperlipidemia  E78.2 272.2           Plan: After thoroughly evaluating Ricardo Hugo, I believe the best course of action is to proceed with left arteriovenous graft placement.  Risks/benefits were explained at great length to the patient which include but are not limited to bleeding, infection of the graft, vessel damage, steal syndrome, and graft failure.  He is right-hand dominant.  He did have vein mapping today which shows very small veins, making him a candidate for an AV graft and not a fistula.  He does attend dialysis on Monday, Wednesday, and Friday.  His surgery would need to be on a Tuesday or Thursday, I did explain that Dr. Pineda is in the office on Tuesdays, so it would have to be on a Thursday.  They are going to talk it over and let me know on Monday if they want to move forward.  Overall he is doing well and has no complaints at this time.  His right AKA incision is healed, I did remove staples, cleaning the area with iodine and covering with a Mepilex.  He has an already scheduled appointment with STERLING Perez on 11/6/2025 with testing to include ABIs and carotid duplex.    He should continue his aspirin 81 mg daily, Plavix 75 mg daily, Lipitor 10 mg daily in addition to his other medications.  I did discuss vascular risk factors as it pertains to the progression of vascular disease including controlling his hypertension and hyperlipidemia.  His blood pressure stable today in office.  His lipid panel from 3 weeks ago shows all values within normal limits except for a decreased HDL at 26.  Body mass index is 16.27 kg/m². .       This was all discussed in full with complete understanding.    Thank you for allowing me to participate in the care of your patient.  Please do not hesitate with any questions or concerns.  I will keep you aware of any further encounters with Ricardo Hugo.        Sincerely  yours,         Melanie Ayala, APRN

## 2025-07-26 ENCOUNTER — HOSPITAL ENCOUNTER (EMERGENCY)
Facility: HOSPITAL | Age: 75
Discharge: HOME | End: 2025-07-26
Attending: EMERGENCY MEDICINE
Payer: MEDICARE

## 2025-07-26 VITALS
OXYGEN SATURATION: 94 % | SYSTOLIC BLOOD PRESSURE: 122 MMHG | HEIGHT: 72 IN | TEMPERATURE: 99.1 F | WEIGHT: 123.44 LBS | DIASTOLIC BLOOD PRESSURE: 38 MMHG | RESPIRATION RATE: 18 BRPM | HEART RATE: 71 BPM | BODY MASS INDEX: 16.72 KG/M2

## 2025-07-26 DIAGNOSIS — N18.9 CHRONIC KIDNEY DISEASE, UNSPECIFIED CKD STAGE: ICD-10-CM

## 2025-07-26 DIAGNOSIS — I73.9 PERIPHERAL VASCULAR DISEASE: Primary | ICD-10-CM

## 2025-07-26 DIAGNOSIS — D64.9 CHRONIC ANEMIA: ICD-10-CM

## 2025-07-26 DIAGNOSIS — M79.672 LEFT FOOT PAIN: ICD-10-CM

## 2025-07-26 LAB
ANION GAP SERPL CALCULATED.3IONS-SCNC: 13 MMOL/L (ref 5–15)
BASOPHILS # BLD AUTO: 0.02 10*3/MM3 (ref 0–0.2)
BASOPHILS NFR BLD AUTO: 0.2 % (ref 0–1.5)
BUN SERPL-MCNC: 35.9 MG/DL (ref 8–23)
BUN/CREAT SERPL: 11.1 (ref 7–25)
CALCIUM SPEC-SCNC: 8.2 MG/DL (ref 8.6–10.5)
CHLORIDE SERPL-SCNC: 96 MMOL/L (ref 98–107)
CO2 SERPL-SCNC: 25 MMOL/L (ref 22–29)
CREAT SERPL-MCNC: 3.24 MG/DL (ref 0.76–1.27)
DEPRECATED RDW RBC AUTO: 71.3 FL (ref 37–54)
EGFRCR SERPLBLD CKD-EPI 2021: 18.9 ML/MIN/1.73
EOSINOPHIL # BLD AUTO: 0.25 10*3/MM3 (ref 0–0.4)
EOSINOPHIL NFR BLD AUTO: 2.9 % (ref 0.3–6.2)
ERYTHROCYTE [DISTWIDTH] IN BLOOD BY AUTOMATED COUNT: 19.8 % (ref 12.3–15.4)
GLUCOSE SERPL-MCNC: 108 MG/DL (ref 65–99)
HCT VFR BLD AUTO: 26.3 % (ref 37.5–51)
HGB BLD-MCNC: 8.1 G/DL (ref 13–17.7)
IMM GRANULOCYTES # BLD AUTO: 0.22 10*3/MM3 (ref 0–0.05)
IMM GRANULOCYTES NFR BLD AUTO: 2.5 % (ref 0–0.5)
LYMPHOCYTES # BLD AUTO: 1.22 10*3/MM3 (ref 0.7–3.1)
LYMPHOCYTES NFR BLD AUTO: 14 % (ref 19.6–45.3)
MCH RBC QN AUTO: 31.8 PG (ref 26.6–33)
MCHC RBC AUTO-ENTMCNC: 30.8 G/DL (ref 31.5–35.7)
MCV RBC AUTO: 103.1 FL (ref 79–97)
MONOCYTES # BLD AUTO: 0.95 10*3/MM3 (ref 0.1–0.9)
MONOCYTES NFR BLD AUTO: 10.9 % (ref 5–12)
NEUTROPHILS NFR BLD AUTO: 6.06 10*3/MM3 (ref 1.7–7)
NEUTROPHILS NFR BLD AUTO: 69.5 % (ref 42.7–76)
NRBC BLD AUTO-RTO: 0.2 /100 WBC (ref 0–0.2)
PLATELET # BLD AUTO: 122 10*3/MM3 (ref 140–450)
PMV BLD AUTO: 10.2 FL (ref 6–12)
POTASSIUM SERPL-SCNC: 3.5 MMOL/L (ref 3.5–5.2)
RBC # BLD AUTO: 2.55 10*6/MM3 (ref 4.14–5.8)
SODIUM SERPL-SCNC: 134 MMOL/L (ref 136–145)
WBC NRBC COR # BLD AUTO: 8.72 10*3/MM3 (ref 3.4–10.8)

## 2025-07-26 PROCEDURE — 99283 EMERGENCY DEPT VISIT LOW MDM: CPT | Performed by: EMERGENCY MEDICINE

## 2025-07-26 PROCEDURE — 93005 ELECTROCARDIOGRAM TRACING: CPT | Performed by: EMERGENCY MEDICINE

## 2025-07-26 PROCEDURE — 85025 COMPLETE CBC W/AUTO DIFF WBC: CPT | Performed by: EMERGENCY MEDICINE

## 2025-07-26 PROCEDURE — 80048 BASIC METABOLIC PNL TOTAL CA: CPT | Performed by: EMERGENCY MEDICINE

## 2025-07-26 RX ORDER — OXYCODONE AND ACETAMINOPHEN 7.5; 325 MG/1; MG/1
1 TABLET ORAL EVERY 6 HOURS PRN
Qty: 6 TABLET | Refills: 0 | Status: ON HOLD | OUTPATIENT
Start: 2025-07-26 | End: 2025-07-29

## 2025-07-26 RX ORDER — SODIUM CHLORIDE 0.9 % (FLUSH) 0.9 %
10 SYRINGE (ML) INJECTION AS NEEDED
Status: DISCONTINUED | OUTPATIENT
Start: 2025-07-26 | End: 2025-07-26 | Stop reason: HOSPADM

## 2025-07-26 NOTE — ED PROVIDER NOTES
Subjective   History of Present Illness  Patient is 75-year-old gentleman who came to the ER complaining of left lower extremity pain and discomfort.  He has had this going on for the past several months but he thinks it is worse now that is why came to the ED for evaluation.  Patient was seen on the 17th with same complaints and had a angiogram performed results of which have been reviewed.  Patient denies any chest pain or shortness of breath denies any fever.  He appears to have decreased perfusion to his foot.  The dorsalis pedis and posterior tibial pulses not dopplerable popliteal pulses dopplerable.    Foot Pain  Location:  Left foot pain  Severity:  Moderate  Onset quality:  Gradual  Timing:  Constant  Progression:  Worsening  Chronicity:  Chronic  Associated symptoms: no abdominal pain, no chest pain, no congestion, no cough, no fatigue, no fever, no headaches, no loss of consciousness, no myalgias, no nausea, no rhinorrhea, no shortness of breath, no sore throat, no vomiting and no wheezing        Review of Systems   Constitutional: Negative.  Negative for chills, fatigue and fever.   HENT: Negative.  Negative for congestion, rhinorrhea and sore throat.    Respiratory: Negative.  Negative for cough, chest tightness, shortness of breath, wheezing and stridor.    Cardiovascular: Negative.  Negative for chest pain.   Gastrointestinal: Negative.  Negative for abdominal distention, abdominal pain, nausea and vomiting.   Endocrine: Negative.    Genitourinary: Negative.  Negative for difficulty urinating and flank pain.   Musculoskeletal: Negative.  Negative for myalgias.   Skin: Negative.  Negative for color change.   Neurological: Negative.  Negative for dizziness, loss of consciousness and headaches.   All other systems reviewed and are negative.      Past Medical History:   Diagnosis Date    3-vessel CAD 08/11/2020    Allergic rhinitis     Anemia     Anxiety disorder 04/27/2020    Arthritis     Asymmetrical  sensorineural hearing loss 06/28/2017    Atherosclerosis of native artery of both lower extremities with intermittent claudication 07/18/2019    Avascular necrosis of femoral head, left 07/11/2020    right hip after surgery    Carotid stenosis     Chronic mucoid otitis media     Chronic rhinitis     COPD (chronic obstructive pulmonary disease)     Coronary artery disease     HEART BYPASS 2004    Crohn's disease of large intestine with other complication 07/30/2020    Chronic diarrhea Colonoscopy July 2020 revealed mild patchy scattered hemosiderin staining with inflammation more so in rectosigmoid area.  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    Iron deficiency anemia 07/14/2020    Mixed hearing loss of left ear     PAD (peripheral artery disease) 08/11/2020    Perianal abscess     Pernicious anemia 08/17/2020    took shots but never diagnosed with b12 deficiency    Personal history of alcoholism 08/11/2020    quit drinking in 2013    Prostatic hypertrophy 08/11/2020    Sensorineural hearing loss     Sepsis with acute renal failure 09/15/2020    Shortness of breath 05/27/2021    Tinnitus     Ventricular tachycardia, nonsustained 07/14/2020    Weight loss 07/11/2020       Allergies   Allergen Reactions    Ondansetron Anaphylaxis and GI Intolerance    Zofran [Ondansetron Hcl] Anaphylaxis    Lortab [Hydrocodone-Acetaminophen] Other (See Comments) and Hallucinations     CLOSTROPHOBIC    Allopurinol Other (See Comments)     Pain on right side       Past Surgical History:   Procedure Laterality Date    ABOVE KNEE AMPUTATION Right 6/24/2025    Procedure: right  above knee amputation;  Surgeon: Blayne Zabala MD;  Location: [facility] OR;  Service: Vascular;  Laterality: Right;    AORTOGRAM Right 4/25/2025    Procedure: RIGHT LOWER EXTREMITY ANGIOGRAM, SHOCKWAVE LITHOTRIPSY, BALLOON ANGIOPLASTY, MYNX CLOSURE;  Surgeon: Gil Pineda DO;  Location:  [facility];  Service: Vascular;  Laterality: Right;    AORTOGRAM Left 5/22/2025    Procedure: LEFT LOWER EXTREMITY ANGIOGRAM, SHOCKWAVE LITHOTRIPSY, BALLOON ANGIOPLASTY, STENT PLACEMENT, MYNX CLOSURE;  Surgeon: Blayne Zabala MD;  Location:  [facility];  Service: Vascular;  Laterality: Left;    AORTOGRAM Right 5/30/2025    Procedure: AORTOILIAC ANGIOGRAM WITH LEFT LOWER EXTREMITY ANGIOGRAM;  Surgeon: Gil Pineda DO;  Location: [facility] HYBRID OR;  Service: Vascular;  Laterality: Right;    AORTOGRAM Right 6/20/2025    Procedure: right lower extremity arteriogram, shockwave lithotripsy, balloon angioplasty, mynx closue;  Surgeon: Blayne Zabala MD;  Location: [facility] HYBRID OR;  Service: Vascular;  Laterality: Right;    ARTERY SURGERY  2021    right side on neck    CAROTID ENDARTERECTOMY Right 05/10/2021    Procedure: RIGHT CAROTID ENDARTERECTOMY WITH EEG;  Surgeon: Gil Pineda DO;  Location: [facility] HYBRID OR 12;  Service: Vascular;  Laterality: Right;    COLONOSCOPY N/A 07/02/2020    Procedure: COLONOSCOPY WITH ANESTHESIA;  Surgeon: Adrien Brewster MD;  Location: [facility] ENDOSCOPY;  Service: Gastroenterology;  Laterality: N/A;  pre op: diarrhea  post op: polyps  PCP: Joe Velasco MD    COLONOSCOPY N/A 10/13/2020    Procedure: COLONOSCOPY WITH ANESTHESIA;  Surgeon: Adrien Brewster MD;  Location: [facility] ENDOSCOPY;  Service: Gastroenterology;  Laterality: N/A;  Pre: Chronic Diarrhea, Crohn's  Post: AVM  Dr. Neftali Velasco  CO2 Inflation Used    COLONOSCOPY N/A 12/08/2023    Procedure: COLONOSCOPY WITH ANESTHESIA;  Surgeon: Adrien Brewster MD;  Location: [facility] ENDOSCOPY;  Service:  Gastroenterology;  Laterality: N/A;  pre chrone's disease  post sub optimal prep, polyp, chrone's      CORONARY ARTERY BYPASS GRAFT  2003    x3    ENDOSCOPY N/A 11/02/2021    Procedure: ESOPHAGOGASTRODUODENOSCOPY WITH ANESTHESIA;  Surgeon: Bridger Bell MD;  Location: [facility] ENDOSCOPY;  Service: Gastroenterology;  Laterality: N/A;  pre anemia;gi bleed  post  gi bleed;schatski ring  Dr. ERIC Velasco    ENDOSCOPY N/A 10/10/2023    Procedure: ESOPHAGOGASTRODUODENOSCOPY WITH ANESTHESIA;  Surgeon: Adrien Brewster MD;  Location: [facility] ENDOSCOPY;  Service: Gastroenterology;  Laterality: N/A;  preop; anemia  postop esophagitis ; R/O barretts   PCP Randall Beata    EYE SURGERY Bilateral     catorac    INCISION AND DRAINAGE PERIRECTAL ABSCESS N/A 03/03/2017    Procedure: INCISION AND DRAINAGE OF JEET ANAL ABSCESS;  Surgeon: Lynette Smith MD;  Location: [facility] OR;  Service:     INGUINAL HERNIA REPAIR Bilateral 06/27/2023    Procedure: INGUINAL HERNIA BILATERAL REPAIR LAPAROSCOPIC WITH DAVINCI ROBOT WITH MESH;  Surgeon: Tahira Rivera MD;  Location: [facility] OR;  Service: Robotics - DaVinci;  Laterality: Bilateral;    INSERTION HEMODIALYSIS CATHETER N/A 4/16/2025    Procedure: HEMODIALYSIS CATHETER PLACEMENT;  Surgeon: Gil Pineda DO;  Location: [facility] HYBRID OR;  Service: Vascular;  Laterality: N/A;    MYRINGOTOMY W/ TUBES Left 04/17/2017    06/10/2016    TONSILLECTOMY      TOTAL HIP ARTHROPLASTY Right 2006       Family History   Problem Relation Age of Onset    Breast cancer Mother     Dementia Father     Glaucoma Father     No Known Problems Daughter     Colon polyps Neg Hx     Colon cancer Neg Hx        Social History     Socioeconomic History    Marital status:    Tobacco Use    Smoking status: Former     Current packs/day: 0.00     Average packs/day: 0.5 packs/day for 25.8 years (12.9 ttl pk-yrs)     Types: Cigarettes     Start date: 1988     Quit date: 10/13/2013     Years since  quittin.7     Passive exposure: Past    Smokeless tobacco: Never    Tobacco comments:     quit 2013   Vaping Use    Vaping status: Former    Substances: Nicotine    Devices: Pre-filled or refillable cartridge   Substance and Sexual Activity    Alcohol use: Not Currently    Drug use: No    Sexual activity: Not Currently     Partners: Female           Objective   Physical Exam  Vitals and nursing note reviewed. Exam conducted with a chaperone present.   Constitutional:       General: He is awake.      Appearance: He is well-developed and underweight. He is ill-appearing. He is not diaphoretic.      Comments: Chronically sick appearing   HENT:      Head: Normocephalic and atraumatic.   Eyes:      General: Lids are normal.      Conjunctiva/sclera: Conjunctivae normal.      Pupils: Pupils are equal, round, and reactive to light.   Cardiovascular:      Rate and Rhythm: Normal rate and regular rhythm.      Chest Wall: PMI is not displaced.      Pulses:           Dorsalis pedis pulses are 0 on the left side.        Posterior tibial pulses are 0 on the left side.      Heart sounds: Normal heart sounds. No murmur heard.     Comments: Right BKA  Pulmonary:      Effort: Pulmonary effort is normal. No respiratory distress.      Breath sounds: Normal breath sounds. No decreased breath sounds or wheezing.   Chest:      Comments: Vascular catheter for dialysis.  Abdominal:      General: Abdomen is flat. Bowel sounds are normal.      Palpations: Abdomen is soft.      Tenderness: There is no abdominal tenderness. There is no guarding.   Musculoskeletal:         General: Normal range of motion.      Cervical back: Full passive range of motion without pain, normal range of motion and neck supple.   Skin:     General: Skin is warm and dry.      Capillary Refill: Capillary refill takes less than 2 seconds.   Neurological:      General: No focal deficit present.      Mental Status: He is alert and oriented to person, place, and time.       Motor: Motor function is intact.      Deep Tendon Reflexes: Reflexes are normal and symmetric.         Procedures           ED Course                                                       Medical Decision Making  Patient with left foot pain and discomfort lab work was obtained patient's lab workup with a baseline baseline anemia baseline CKD is on dialysis supposed to get dialyzed on Monday.  I had extensive discussion on the phone with Dr. Pineda regarding the patient CT angio which was abnormal and was done on 17 I am not repeated the CT angio.  Dr. Pineda is well aware of this patient and his condition in his opinion the patient is not a candidate for revascularization and will require AKA on that side also.  Recommendation was the patient to be discharged home on pain medications and to follow-up with them on Monday I have discussed this case with the patient and his wife informed about the plan and will discharge him home with a follow-up.  There is no clinical indication to admit him to the hospitalist has been normal revascularize him Dr. Pineda did not recommend heparinizing him or putting on anticoagulation at this time.    Problems Addressed:  Chronic anemia: chronic illness or injury  Chronic kidney disease, unspecified CKD stage: chronic illness or injury  Left foot pain: chronic illness or injury  Peripheral vascular disease: chronic illness or injury    Amount and/or Complexity of Data Reviewed  Labs: ordered.     Details: Labs reviewed  ECG/medicine tests: ordered.  Discussion of management or test interpretation with external provider(s): Discussed with Dr. Pineda.    Risk  Prescription drug management.  Risk Details: Patient with peripheral vascular disease on examination of his foot he has got diminished pulses and evidence of decreased perfusion but there is no gangrene noted.  He still has perception of sensory touch.  And motor system exam reveals he is able to move his toes.  And at  the ankle that he is able to move it.  But the skin is somewhat cool to touch when compared to more proximal skin.  Has got a popliteal loss vessel that is dopplerable.  I have discussed this case with Dr. Pineda recommendation is to discharge patient home pain medication and follow-up on Monday for surgical planning and amputation.        Final diagnoses:   Peripheral vascular disease   Left foot pain   Chronic kidney disease, unspecified CKD stage   Chronic anemia       ED Disposition  ED Disposition       ED Disposition   Discharge    Condition   Stable    Comment   --               Nathan Zimmer MD  [address] [place] [id number] [phone]          Gil Pineda, DO  [address]  [place] [address]  [phone]      Please call and follow-up on Monday         Medication List        New Prescriptions      oxyCODONE-acetaminophen 7.5-325 MG per tablet  Commonly known as: PERCOCET  Take 1 tablet by mouth Every 6 (Six) Hours As Needed for Moderate Pain for up to 6 doses.               Where to Get Your Medications        These medications were sent to [id number] - [place] - [address] - [phone]  - [phone] FX  [address] [address]      Phone: [phone]   oxyCODONE-acetaminophen 7.5-325 MG per tablet            Matias Kelley MD  07/26/25 [id number]

## 2025-07-28 ENCOUNTER — TELEPHONE (OUTPATIENT)
Dept: VASCULAR SURGERY | Facility: CLINIC | Age: 75
End: 2025-07-28
Payer: MEDICARE

## 2025-07-28 ENCOUNTER — HOSPITAL ENCOUNTER (INPATIENT)
Facility: HOSPITAL | Age: 75
LOS: 10 days | Discharge: NURSING, REHAB OR LONG-TERM CARE | DRG: 239 | End: 2025-08-09
Attending: FAMILY MEDICINE | Admitting: STUDENT IN AN ORGANIZED HEALTH CARE EDUCATION/TRAINING PROGRAM
Payer: MEDICARE

## 2025-07-28 ENCOUNTER — HOSPITAL ENCOUNTER (EMERGENCY)
Facility: HOSPITAL | Age: 75
Discharge: NURSING, REHAB OR LONG-TERM CARE | DRG: 239 | End: 2025-07-28
Attending: FAMILY MEDICINE | Admitting: FAMILY MEDICINE
Payer: MEDICARE

## 2025-07-28 VITALS
TEMPERATURE: 99.2 F | HEART RATE: 88 BPM | DIASTOLIC BLOOD PRESSURE: 49 MMHG | RESPIRATION RATE: 14 BRPM | OXYGEN SATURATION: 94 % | SYSTOLIC BLOOD PRESSURE: 163 MMHG | HEIGHT: 72 IN | BODY MASS INDEX: 14.76 KG/M2 | WEIGHT: 109 LBS

## 2025-07-28 DIAGNOSIS — I73.9 PAD (PERIPHERAL ARTERY DISEASE): ICD-10-CM

## 2025-07-28 DIAGNOSIS — R31.9 URINARY TRACT INFECTION WITH HEMATURIA, SITE UNSPECIFIED: Primary | ICD-10-CM

## 2025-07-28 DIAGNOSIS — L89.101 PRESSURE INJURY OF BACK, STAGE 1: Primary | ICD-10-CM

## 2025-07-28 DIAGNOSIS — Z74.09 IMPAIRED MOBILITY: ICD-10-CM

## 2025-07-28 DIAGNOSIS — N39.0 URINARY TRACT INFECTION WITH HEMATURIA, SITE UNSPECIFIED: Primary | ICD-10-CM

## 2025-07-28 DIAGNOSIS — R13.12 DYSPHAGIA, OROPHARYNGEAL: ICD-10-CM

## 2025-07-28 LAB
ANION GAP SERPL CALCULATED.3IONS-SCNC: 12 MMOL/L (ref 5–15)
ANISOCYTOSIS BLD QL: ABNORMAL
BACTERIA UR QL AUTO: ABNORMAL /HPF
BASOPHILS # BLD MANUAL: 0 10*3/MM3 (ref 0–0.2)
BASOPHILS NFR BLD MANUAL: 0 % (ref 0–1.5)
BILIRUB UR QL STRIP: NEGATIVE
BUN SERPL-MCNC: 33.6 MG/DL (ref 8–23)
BUN/CREAT SERPL: 11.9 (ref 7–25)
CALCIUM SPEC-SCNC: 8.9 MG/DL (ref 8.6–10.5)
CHLORIDE SERPL-SCNC: 94 MMOL/L (ref 98–107)
CLARITY UR: ABNORMAL
CO2 SERPL-SCNC: 26 MMOL/L (ref 22–29)
COLOR UR: YELLOW
CREAT SERPL-MCNC: 2.82 MG/DL (ref 0.76–1.27)
D-LACTATE SERPL-SCNC: 0.9 MMOL/L (ref 0.5–2)
DEPRECATED RDW RBC AUTO: 71.8 FL (ref 37–54)
EGFRCR SERPLBLD CKD-EPI 2021: 22.3 ML/MIN/1.73
EOSINOPHIL # BLD MANUAL: 0.3 10*3/MM3 (ref 0–0.4)
EOSINOPHIL NFR BLD MANUAL: 2.4 % (ref 0.3–6.2)
ERYTHROCYTE [DISTWIDTH] IN BLOOD BY AUTOMATED COUNT: 19.8 % (ref 12.3–15.4)
GLUCOSE SERPL-MCNC: 87 MG/DL (ref 65–99)
GLUCOSE UR STRIP-MCNC: NEGATIVE MG/DL
HCT VFR BLD AUTO: 27 % (ref 37.5–51)
HGB BLD-MCNC: 8.7 G/DL (ref 13–17.7)
HGB UR QL STRIP.AUTO: ABNORMAL
HYALINE CASTS UR QL AUTO: ABNORMAL /LPF
HYPOCHROMIA BLD QL: ABNORMAL
KETONES UR QL STRIP: NEGATIVE
LEUKOCYTE ESTERASE UR QL STRIP.AUTO: ABNORMAL
LYMPHOCYTES # BLD MANUAL: 0.71 10*3/MM3 (ref 0.7–3.1)
LYMPHOCYTES NFR BLD MANUAL: 4.8 % (ref 5–12)
MACROCYTES BLD QL SMEAR: ABNORMAL
MAGNESIUM SERPL-MCNC: 1.6 MG/DL (ref 1.6–2.4)
MCH RBC QN AUTO: 33 PG (ref 26.6–33)
MCHC RBC AUTO-ENTMCNC: 32.2 G/DL (ref 31.5–35.7)
MCV RBC AUTO: 102.3 FL (ref 79–97)
MONOCYTES # BLD: 0.61 10*3/MM3 (ref 0.1–0.9)
MRSA DNA SPEC QL NAA+PROBE: NORMAL
MYELOCYTES NFR BLD MANUAL: 0.8 % (ref 0–0)
NEUTROPHILS # BLD AUTO: 10.94 10*3/MM3 (ref 1.7–7)
NEUTROPHILS NFR BLD MANUAL: 82.4 % (ref 42.7–76)
NEUTS BAND NFR BLD MANUAL: 4 % (ref 0–5)
NITRITE UR QL STRIP: NEGATIVE
NRBC SPEC MANUAL: 0.8 /100 WBC (ref 0–0.2)
PH UR STRIP.AUTO: 6.5 [PH] (ref 5–8)
PLATELET # BLD AUTO: 93 10*3/MM3 (ref 140–450)
PMV BLD AUTO: 9.7 FL (ref 6–12)
POIKILOCYTOSIS BLD QL SMEAR: ABNORMAL
POTASSIUM SERPL-SCNC: 3.7 MMOL/L (ref 3.5–5.2)
PROCALCITONIN SERPL-MCNC: 1.09 NG/ML (ref 0–0.25)
PROT UR QL STRIP: ABNORMAL
QT INTERVAL: 442 MS
QTC INTERVAL: 477 MS
RBC # BLD AUTO: 2.64 10*6/MM3 (ref 4.14–5.8)
RBC # UR STRIP: ABNORMAL /HPF
REF LAB TEST METHOD: ABNORMAL
SMALL PLATELETS BLD QL SMEAR: ABNORMAL
SODIUM SERPL-SCNC: 132 MMOL/L (ref 136–145)
SP GR UR STRIP: 1.01 (ref 1–1.03)
SQUAMOUS #/AREA URNS HPF: ABNORMAL /HPF
UROBILINOGEN UR QL STRIP: ABNORMAL
VARIANT LYMPHS NFR BLD MANUAL: 5.6 % (ref 19.6–45.3)
WBC # UR STRIP: ABNORMAL /HPF
WBC MORPH BLD: NORMAL
WBC NRBC COR # BLD AUTO: 12.66 10*3/MM3 (ref 3.4–10.8)

## 2025-07-28 PROCEDURE — 85007 BL SMEAR W/DIFF WBC COUNT: CPT | Performed by: FAMILY MEDICINE

## 2025-07-28 PROCEDURE — 87641 MR-STAPH DNA AMP PROBE: CPT

## 2025-07-28 PROCEDURE — 25010000002 CEFEPIME PER 500 MG

## 2025-07-28 PROCEDURE — 25810000003 SODIUM CHLORIDE 0.9 % SOLUTION

## 2025-07-28 PROCEDURE — 80048 BASIC METABOLIC PNL TOTAL CA: CPT | Performed by: FAMILY MEDICINE

## 2025-07-28 PROCEDURE — 85025 COMPLETE CBC W/AUTO DIFF WBC: CPT | Performed by: FAMILY MEDICINE

## 2025-07-28 PROCEDURE — 87040 BLOOD CULTURE FOR BACTERIA: CPT

## 2025-07-28 PROCEDURE — 25010000002 CEFTRIAXONE PER 250 MG: Performed by: FAMILY MEDICINE

## 2025-07-28 PROCEDURE — 84145 PROCALCITONIN (PCT): CPT

## 2025-07-28 PROCEDURE — 99285 EMERGENCY DEPT VISIT HI MDM: CPT | Performed by: FAMILY MEDICINE

## 2025-07-28 PROCEDURE — 87077 CULTURE AEROBIC IDENTIFY: CPT | Performed by: FAMILY MEDICINE

## 2025-07-28 PROCEDURE — P9612 CATHETERIZE FOR URINE SPEC: HCPCS

## 2025-07-28 PROCEDURE — 87086 URINE CULTURE/COLONY COUNT: CPT | Performed by: FAMILY MEDICINE

## 2025-07-28 PROCEDURE — 99283 EMERGENCY DEPT VISIT LOW MDM: CPT | Performed by: FAMILY MEDICINE

## 2025-07-28 PROCEDURE — 83735 ASSAY OF MAGNESIUM: CPT | Performed by: FAMILY MEDICINE

## 2025-07-28 PROCEDURE — 87481 CANDIDA DNA AMP PROBE: CPT

## 2025-07-28 PROCEDURE — 81001 URINALYSIS AUTO W/SCOPE: CPT | Performed by: FAMILY MEDICINE

## 2025-07-28 PROCEDURE — 83605 ASSAY OF LACTIC ACID: CPT

## 2025-07-28 PROCEDURE — 87186 SC STD MICRODIL/AGAR DIL: CPT | Performed by: FAMILY MEDICINE

## 2025-07-28 PROCEDURE — G0378 HOSPITAL OBSERVATION PER HR: HCPCS

## 2025-07-28 RX ORDER — CLOPIDOGREL BISULFATE 75 MG/1
75 TABLET ORAL DAILY
Status: DISCONTINUED | OUTPATIENT
Start: 2025-07-29 | End: 2025-08-09 | Stop reason: HOSPADM

## 2025-07-28 RX ORDER — PANTOPRAZOLE SODIUM 40 MG/1
40 TABLET, DELAYED RELEASE ORAL
Status: DISCONTINUED | OUTPATIENT
Start: 2025-07-29 | End: 2025-08-09 | Stop reason: HOSPADM

## 2025-07-28 RX ORDER — SODIUM CHLORIDE 0.9 % (FLUSH) 0.9 %
10 SYRINGE (ML) INJECTION AS NEEDED
Status: DISCONTINUED | OUTPATIENT
Start: 2025-07-28 | End: 2025-07-31 | Stop reason: SDUPTHER

## 2025-07-28 RX ORDER — SODIUM CHLORIDE 0.9 % (FLUSH) 0.9 %
10 SYRINGE (ML) INJECTION EVERY 12 HOURS SCHEDULED
Status: DISCONTINUED | OUTPATIENT
Start: 2025-07-28 | End: 2025-07-31 | Stop reason: SDUPTHER

## 2025-07-28 RX ORDER — SODIUM CHLORIDE 9 MG/ML
40 INJECTION, SOLUTION INTRAVENOUS AS NEEDED
Status: DISCONTINUED | OUTPATIENT
Start: 2025-07-28 | End: 2025-07-31 | Stop reason: SDUPTHER

## 2025-07-28 RX ORDER — CALCITRIOL 0.25 UG/1
0.5 CAPSULE, LIQUID FILLED ORAL DAILY
Status: DISCONTINUED | OUTPATIENT
Start: 2025-07-29 | End: 2025-08-09 | Stop reason: HOSPADM

## 2025-07-28 RX ORDER — AMOXICILLIN 250 MG
2 CAPSULE ORAL 2 TIMES DAILY PRN
Status: DISCONTINUED | OUTPATIENT
Start: 2025-07-28 | End: 2025-08-09 | Stop reason: HOSPADM

## 2025-07-28 RX ORDER — ISOSORBIDE DINITRATE 10 MG/1
10 TABLET ORAL
Status: DISCONTINUED | OUTPATIENT
Start: 2025-07-29 | End: 2025-08-09 | Stop reason: HOSPADM

## 2025-07-28 RX ORDER — ACETAMINOPHEN 160 MG/5ML
650 SOLUTION ORAL EVERY 4 HOURS PRN
Status: DISCONTINUED | OUTPATIENT
Start: 2025-07-28 | End: 2025-07-31 | Stop reason: SDUPTHER

## 2025-07-28 RX ORDER — LEVOTHYROXINE SODIUM 100 UG/1
100 TABLET ORAL
Status: DISCONTINUED | OUTPATIENT
Start: 2025-07-29 | End: 2025-07-30

## 2025-07-28 RX ORDER — OXYCODONE AND ACETAMINOPHEN 7.5; 325 MG/1; MG/1
1 TABLET ORAL EVERY 8 HOURS PRN
Refills: 0 | Status: COMPLETED | OUTPATIENT
Start: 2025-07-28 | End: 2025-07-29

## 2025-07-28 RX ORDER — SODIUM CHLORIDE 0.9 % (FLUSH) 0.9 %
10 SYRINGE (ML) INJECTION AS NEEDED
Status: DISCONTINUED | OUTPATIENT
Start: 2025-07-28 | End: 2025-08-09 | Stop reason: HOSPADM

## 2025-07-28 RX ORDER — ATORVASTATIN CALCIUM 10 MG/1
10 TABLET, FILM COATED ORAL DAILY
Status: DISCONTINUED | OUTPATIENT
Start: 2025-07-29 | End: 2025-08-09 | Stop reason: HOSPADM

## 2025-07-28 RX ORDER — IPRATROPIUM BROMIDE AND ALBUTEROL SULFATE 2.5; .5 MG/3ML; MG/3ML
3 SOLUTION RESPIRATORY (INHALATION) EVERY 4 HOURS PRN
Status: DISCONTINUED | OUTPATIENT
Start: 2025-07-28 | End: 2025-08-09 | Stop reason: HOSPADM

## 2025-07-28 RX ORDER — TAMSULOSIN HYDROCHLORIDE 0.4 MG/1
0.4 CAPSULE ORAL NIGHTLY
Status: DISCONTINUED | OUTPATIENT
Start: 2025-07-28 | End: 2025-08-09 | Stop reason: HOSPADM

## 2025-07-28 RX ORDER — POLYETHYLENE GLYCOL 3350 17 G/17G
17 POWDER, FOR SOLUTION ORAL DAILY PRN
Status: DISCONTINUED | OUTPATIENT
Start: 2025-07-28 | End: 2025-08-09 | Stop reason: HOSPADM

## 2025-07-28 RX ORDER — ASPIRIN 81 MG/1
81 TABLET, CHEWABLE ORAL DAILY
Status: DISCONTINUED | OUTPATIENT
Start: 2025-07-29 | End: 2025-08-09 | Stop reason: HOSPADM

## 2025-07-28 RX ORDER — BISACODYL 10 MG
10 SUPPOSITORY, RECTAL RECTAL DAILY PRN
Status: DISCONTINUED | OUTPATIENT
Start: 2025-07-28 | End: 2025-08-09 | Stop reason: HOSPADM

## 2025-07-28 RX ORDER — BISACODYL 5 MG/1
5 TABLET, DELAYED RELEASE ORAL DAILY PRN
Status: DISCONTINUED | OUTPATIENT
Start: 2025-07-28 | End: 2025-08-09 | Stop reason: HOSPADM

## 2025-07-28 RX ORDER — CARVEDILOL 6.25 MG/1
12.5 TABLET ORAL 2 TIMES DAILY WITH MEALS
Status: DISCONTINUED | OUTPATIENT
Start: 2025-07-28 | End: 2025-08-09 | Stop reason: HOSPADM

## 2025-07-28 RX ORDER — ACETAMINOPHEN 650 MG/1
650 SUPPOSITORY RECTAL EVERY 4 HOURS PRN
Status: DISCONTINUED | OUTPATIENT
Start: 2025-07-28 | End: 2025-07-31 | Stop reason: SDUPTHER

## 2025-07-28 RX ORDER — NITROGLYCERIN 0.4 MG/1
0.4 TABLET SUBLINGUAL
Status: DISCONTINUED | OUTPATIENT
Start: 2025-07-28 | End: 2025-08-09 | Stop reason: HOSPADM

## 2025-07-28 RX ORDER — ACETAMINOPHEN 325 MG/1
650 TABLET ORAL EVERY 4 HOURS PRN
Status: DISCONTINUED | OUTPATIENT
Start: 2025-07-28 | End: 2025-07-31 | Stop reason: SDUPTHER

## 2025-07-28 RX ADMIN — Medication 10 ML: at 21:11

## 2025-07-28 RX ADMIN — CEFTRIAXONE SODIUM 1000 MG: 1 INJECTION, POWDER, FOR SOLUTION INTRAMUSCULAR; INTRAVENOUS at 19:28

## 2025-07-28 RX ADMIN — CARVEDILOL 12.5 MG: 6.25 TABLET, FILM COATED ORAL at 21:11

## 2025-07-28 RX ADMIN — OXYCODONE HYDROCHLORIDE AND ACETAMINOPHEN 1 TABLET: 7.5; 325 TABLET ORAL at 21:11

## 2025-07-28 RX ADMIN — TAMSULOSIN HYDROCHLORIDE 0.4 MG: 0.4 CAPSULE ORAL at 21:11

## 2025-07-28 RX ADMIN — CEFEPIME 2000 MG: 2 INJECTION, POWDER, FOR SOLUTION INTRAVENOUS at 21:52

## 2025-07-28 RX ADMIN — SODIUM CHLORIDE 500 ML: 0.9 INJECTION, SOLUTION INTRAVENOUS at 21:19

## 2025-07-28 NOTE — ED PROVIDER NOTES
Subjective   History of Present Illness    Patient arrives to the EMS from dialysis.  He is having pain on his buttocks region.  He cannot tolerate sitting through his dialysis treatments.  He is from [facility].  Patient denies any fevers.  Patient denies any other symptoms at this time.            Review of Systems   All other systems reviewed and are negative.      Past Medical History:   Diagnosis Date    3-vessel CAD 08/11/2020    Allergic rhinitis     Anemia     Anxiety disorder 04/27/2020    Arthritis     Asymmetrical sensorineural hearing loss 06/28/2017    Atherosclerosis of native artery of both lower extremities with intermittent claudication 07/18/2019    Avascular necrosis of femoral head, left 07/11/2020    right hip after surgery    Carotid stenosis     Chronic mucoid otitis media     Chronic rhinitis     COPD (chronic obstructive pulmonary disease)     Coronary artery disease     HEART BYPASS 2004    Crohn's disease of large intestine with other complication 07/30/2020    Chronic diarrhea Colonoscopy July 2020 revealed mild patchy scattered hemosiderin staining with inflammation more so in rectosigmoid area.  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    Iron deficiency anemia 07/14/2020    Mixed hearing loss of left ear     PAD (peripheral artery disease) 08/11/2020    Perianal abscess     Pernicious anemia 08/17/2020    took shots but never diagnosed with b12 deficiency    Personal history of alcoholism 08/11/2020    quit drinking in 2013    Prostatic hypertrophy 08/11/2020    Sensorineural hearing loss     Sepsis with acute renal  failure 09/15/2020    Shortness of breath 05/27/2021    Tinnitus     Ventricular tachycardia, nonsustained 07/14/2020    Weight loss 07/11/2020       Allergies   Allergen Reactions    Ondansetron Anaphylaxis and GI Intolerance    Zofran [Ondansetron Hcl] Anaphylaxis    Lortab [Hydrocodone-Acetaminophen] Other (See Comments) and Hallucinations     CLOSTROPHOBIC    Allopurinol Other (See Comments)     Pain on right side       Past Surgical History:   Procedure Laterality Date    ABOVE KNEE AMPUTATION Right 6/24/2025    Procedure: right above knee amputation;  Surgeon: Blayne Zabala MD;  Location:  [facility] OR;  Service: Vascular;  Laterality: Right;    AORTOGRAM Right 4/25/2025    Procedure: RIGHT LOWER EXTREMITY ANGIOGRAM, SHOCKWAVE LITHOTRIPSY, BALLOON ANGIOPLASTY, MYNX CLOSURE;  Surgeon: Gil Pineda DO;  Location:  [facility];  Service: Vascular;  Laterality: Right;    AORTOGRAM Left 5/22/2025    Procedure: LEFT LOWER EXTREMITY ANGIOGRAM, SHOCKWAVE LITHOTRIPSY, BALLOON ANGIOPLASTY, STENT PLACEMENT, MYNX CLOSURE;  Surgeon: Blayne Zabala MD;  Location:  [facility];  Service: Vascular;  Laterality: Left;    AORTOGRAM Right 5/30/2025    Procedure: AORTOILIAC ANGIOGRAM WITH LEFT LOWER EXTREMITY ANGIOGRAM;  Surgeon: Gil Pineda DO;  Location:  [facility];  Service: Vascular;  Laterality: Right;    AORTOGRAM Right 6/20/2025    Procedure: right lower extremity arteriogram, shockwave lithotripsy, balloon angioplasty, mynx closue;  Surgeon: Blayne Zabala MD;  Location:  [facility];  Service: Vascular;  Laterality: Right;    ARTERY SURGERY  2021    right side on neck    CAROTID ENDARTERECTOMY Right 05/10/2021    Procedure: RIGHT CAROTID ENDARTERECTOMY WITH EEG;  Surgeon: Gil Pineda DO;  Location:  [facility] 12;  Service: Vascular;  Laterality: Right;    COLONOSCOPY N/A 07/02/2020    Procedure: COLONOSCOPY WITH ANESTHESIA;  Surgeon: Adrien Brewster MD;  Location:   [facility];  Service: Gastroenterology;  Laterality: N/A;  pre op: diarrhea  post op: polyps  PCP: Joe Velasco MD    COLONOSCOPY N/A 10/13/2020    Procedure: COLONOSCOPY WITH ANESTHESIA;  Surgeon: Adrien Brewster MD;  Location: [facility] ENDOSCOPY;  Service: Gastroenterology;  Laterality: N/A;  Pre: Chronic Diarrhea, Crohn's  Post: AVM  Dr. Neftali Velasco  CO2 Inflation Used    COLONOSCOPY N/A 12/08/2023    Procedure: COLONOSCOPY WITH ANESTHESIA;  Surgeon: Adrien Brewster MD;  Location: [facility] ENDOSCOPY;  Service: Gastroenterology;  Laterality: N/A;  pre chrone's disease  post sub optimal prep, polyp, chrone's      CORONARY ARTERY BYPASS GRAFT  2003    x3    ENDOSCOPY N/A 11/02/2021    Procedure: ESOPHAGOGASTRODUODENOSCOPY WITH ANESTHESIA;  Surgeon: Bridger Bell MD;  Location: [facility] ENDOSCOPY;  Service: Gastroenterology;  Laterality: N/A;  pre anemia;gi bleed  post  gi bleed;schatski ring  Dr. ERIC Velasco    ENDOSCOPY N/A 10/10/2023    Procedure: ESOPHAGOGASTRODUODENOSCOPY WITH ANESTHESIA;  Surgeon: Adrien Brewster MD;  Location: [facility] ENDOSCOPY;  Service: Gastroenterology;  Laterality: N/A;  preop; anemia  postop esophagitis ; R/O barretts   PCP Randall Beata    EYE SURGERY Bilateral     catorac    INCISION AND DRAINAGE PERIRECTAL ABSCESS N/A 03/03/2017    Procedure: INCISION AND DRAINAGE OF JEET ANAL ABSCESS;  Surgeon: Lynette Smith MD;  Location: [facility] OR;  Service:     INGUINAL HERNIA REPAIR Bilateral 06/27/2023    Procedure: INGUINAL HERNIA BILATERAL REPAIR LAPAROSCOPIC WITH DAVINCI ROBOT WITH MESH;  Surgeon: Tahira Rivera MD;  Location: [facility] OR;  Service: Robotics - DaVinci;  Laterality: Bilateral;    INSERTION HEMODIALYSIS CATHETER N/A 4/16/2025    Procedure: HEMODIALYSIS CATHETER PLACEMENT;  Surgeon: Gil Pineda DO;  Location: [facility] HYBRID OR;  Service: Vascular;  Laterality: N/A;    MYRINGOTOMY W/ TUBES Left 04/17/2017    06/10/2016    TONSILLECTOMY       TOTAL HIP ARTHROPLASTY Right 2006       Family History   Problem Relation Age of Onset    Breast cancer Mother     Dementia Father     Glaucoma Father     No Known Problems Daughter     Colon polyps Neg Hx     Colon cancer Neg Hx        Social History     Socioeconomic History    Marital status:    Tobacco Use    Smoking status: Former     Current packs/day: 0.00     Average packs/day: 0.5 packs/day for 25.8 years (12.9 ttl pk-yrs)     Types: Cigarettes     Start date:      Quit date: 10/13/2013     Years since quittin.7     Passive exposure: Past    Smokeless tobacco: Never    Tobacco comments:     quit 2013   Vaping Use    Vaping status: Former    Substances: Nicotine    Devices: Pre-filled or refillable cartridge   Substance and Sexual Activity    Alcohol use: Not Currently    Drug use: No    Sexual activity: Not Currently     Partners: Female           Objective   Physical Exam  Vitals and nursing note reviewed. Exam conducted with a chaperone present.   Constitutional:       Appearance: Normal appearance.   HENT:      Head: Normocephalic and atraumatic.      Mouth/Throat:      Mouth: Mucous membranes are moist.   Eyes:      Extraocular Movements: Extraocular movements intact.      Pupils: Pupils are equal, round, and reactive to light.   Cardiovascular:      Rate and Rhythm: Normal rate and regular rhythm.      Heart sounds: Normal heart sounds.   Pulmonary:      Effort: Pulmonary effort is normal.      Breath sounds: Normal breath sounds.   Abdominal:      General: Bowel sounds are normal.      Palpations: Abdomen is soft.      Tenderness: There is no abdominal tenderness.   Skin:     General: Skin is warm and dry.      Comments: Superficial skin damage on the low back/buttocks area.   Neurological:      General: No focal deficit present.      Mental Status: He is alert and oriented to person, place, and time.   Psychiatric:         Mood and Affect: Mood normal.         Behavior: Behavior  normal.         Procedures           ED Course                                                       Medical Decision Making  Patient has what appears to be a stage I decubitus ulcer.  Patient has been advised to follow-up with wound care.  Patient was advised to get wound care at the nursing home where he resides.  All questions answered patient prior to discharge.  Patient was advised to follow-up with primary care provider.  Patient was advised return to the emergency room with new or worsening symptoms.  Patient verbalized understanding was agreeable to the plan as discussed.    Problems Addressed:  Pressure injury of back, stage 1: acute illness or injury        Final diagnoses:   Pressure injury of back, stage 1       ED Disposition  ED Disposition       ED Disposition   Discharge    Condition   Stable    Comment   --               Marques Capps,   [address]  [place] [address] [phone]    Schedule an appointment as soon as possible for a visit       [facility] EMERGENCY DEPARTMENT  [address] [facility] [address] [phone]    As needed, If symptoms worsen         Medication List      No changes were made to your prescriptions during this visit.            Laurent Cottrell MD  07/28/25 [id number]

## 2025-07-29 PROBLEM — N18.6 STAGE 5 CHRONIC KIDNEY DISEASE ON CHRONIC DIALYSIS: Chronic | Status: ACTIVE | Noted: 2021-06-25

## 2025-07-29 PROBLEM — E03.9 ACQUIRED HYPOTHYROIDISM: Chronic | Status: ACTIVE | Noted: 2025-07-29

## 2025-07-29 PROBLEM — I10 ESSENTIAL HYPERTENSION: Chronic | Status: ACTIVE | Noted: 2020-04-27

## 2025-07-29 PROBLEM — Z99.2 STAGE 5 CHRONIC KIDNEY DISEASE ON CHRONIC DIALYSIS: Chronic | Status: ACTIVE | Noted: 2021-06-25

## 2025-07-29 LAB
ALBUMIN SERPL-MCNC: 2.6 G/DL (ref 3.5–5.2)
ALBUMIN/GLOB SERPL: 0.9 G/DL
ALP SERPL-CCNC: 230 U/L (ref 39–117)
ALT SERPL W P-5'-P-CCNC: 32 U/L (ref 1–41)
ANION GAP SERPL CALCULATED.3IONS-SCNC: 14 MMOL/L (ref 5–15)
AST SERPL-CCNC: 23 U/L (ref 1–40)
BILIRUB SERPL-MCNC: 0.4 MG/DL (ref 0–1.2)
BUN SERPL-MCNC: 39.2 MG/DL (ref 8–23)
BUN/CREAT SERPL: 12.6 (ref 7–25)
CALCIUM SPEC-SCNC: 8.9 MG/DL (ref 8.6–10.5)
CHLORIDE SERPL-SCNC: 97 MMOL/L (ref 98–107)
CO2 SERPL-SCNC: 25 MMOL/L (ref 22–29)
CREAT SERPL-MCNC: 3.12 MG/DL (ref 0.76–1.27)
DEPRECATED RDW RBC AUTO: 73.6 FL (ref 37–54)
EGFRCR SERPLBLD CKD-EPI 2021: 19.8 ML/MIN/1.73
ERYTHROCYTE [DISTWIDTH] IN BLOOD BY AUTOMATED COUNT: 19.9 % (ref 12.3–15.4)
GLOBULIN UR ELPH-MCNC: 3 GM/DL
GLUCOSE SERPL-MCNC: 89 MG/DL (ref 65–99)
HCT VFR BLD AUTO: 26.5 % (ref 37.5–51)
HGB BLD-MCNC: 8.3 G/DL (ref 13–17.7)
MAGNESIUM SERPL-MCNC: 1.7 MG/DL (ref 1.6–2.4)
MCH RBC QN AUTO: 32 PG (ref 26.6–33)
MCHC RBC AUTO-ENTMCNC: 31.3 G/DL (ref 31.5–35.7)
MCV RBC AUTO: 102.3 FL (ref 79–97)
PHOSPHATE SERPL-MCNC: 4.9 MG/DL (ref 2.5–4.5)
PLATELET # BLD AUTO: 94 10*3/MM3 (ref 140–450)
PMV BLD AUTO: 10.1 FL (ref 6–12)
POTASSIUM SERPL-SCNC: 3.8 MMOL/L (ref 3.5–5.2)
PROT SERPL-MCNC: 5.6 G/DL (ref 6–8.5)
RBC # BLD AUTO: 2.59 10*6/MM3 (ref 4.14–5.8)
SODIUM SERPL-SCNC: 136 MMOL/L (ref 136–145)
WBC NRBC COR # BLD AUTO: 10.63 10*3/MM3 (ref 3.4–10.8)

## 2025-07-29 PROCEDURE — 84100 ASSAY OF PHOSPHORUS: CPT

## 2025-07-29 PROCEDURE — 25010000002 CEFEPIME PER 500 MG

## 2025-07-29 PROCEDURE — 80053 COMPREHEN METABOLIC PANEL: CPT

## 2025-07-29 PROCEDURE — 83735 ASSAY OF MAGNESIUM: CPT

## 2025-07-29 PROCEDURE — 94664 DEMO&/EVAL PT USE INHALER: CPT

## 2025-07-29 PROCEDURE — 94640 AIRWAY INHALATION TREATMENT: CPT

## 2025-07-29 PROCEDURE — 94799 UNLISTED PULMONARY SVC/PX: CPT

## 2025-07-29 PROCEDURE — 85027 COMPLETE CBC AUTOMATED: CPT

## 2025-07-29 PROCEDURE — G0378 HOSPITAL OBSERVATION PER HR: HCPCS

## 2025-07-29 RX ORDER — ASCORBIC ACID 500 MG
500 TABLET ORAL DAILY
COMMUNITY

## 2025-07-29 RX ORDER — ZINC SULFATE 50(220)MG
220 CAPSULE ORAL DAILY
COMMUNITY

## 2025-07-29 RX ORDER — FUROSEMIDE 40 MG/1
40 TABLET ORAL DAILY
COMMUNITY
End: 2025-08-09 | Stop reason: HOSPADM

## 2025-07-29 RX ORDER — BUDESONIDE 0.5 MG/2ML
0.5 INHALANT ORAL
Status: DISCONTINUED | OUTPATIENT
Start: 2025-07-29 | End: 2025-07-30

## 2025-07-29 RX ORDER — OXYCODONE AND ACETAMINOPHEN 5; 325 MG/1; MG/1
1 TABLET ORAL EVERY 8 HOURS PRN
COMMUNITY
End: 2025-08-09 | Stop reason: HOSPADM

## 2025-07-29 RX ORDER — MONTELUKAST SODIUM 10 MG/1
10 TABLET ORAL NIGHTLY
Status: DISCONTINUED | OUTPATIENT
Start: 2025-07-29 | End: 2025-08-09 | Stop reason: HOSPADM

## 2025-07-29 RX ORDER — HYDRALAZINE HYDROCHLORIDE 50 MG/1
50 TABLET, FILM COATED ORAL 3 TIMES DAILY
Status: ON HOLD | COMMUNITY
End: 2025-08-09

## 2025-07-29 RX ORDER — GABAPENTIN 300 MG/1
300 CAPSULE ORAL NIGHTLY
Status: ON HOLD | COMMUNITY
End: 2025-08-09

## 2025-07-29 RX ORDER — ARFORMOTEROL TARTRATE 15 UG/2ML
15 SOLUTION RESPIRATORY (INHALATION)
Status: DISCONTINUED | OUTPATIENT
Start: 2025-07-29 | End: 2025-07-30

## 2025-07-29 RX ORDER — CLONIDINE HYDROCHLORIDE 0.1 MG/1
0.3 TABLET ORAL 3 TIMES DAILY
Status: DISCONTINUED | OUTPATIENT
Start: 2025-07-29 | End: 2025-07-30

## 2025-07-29 RX ORDER — VALSARTAN 80 MG/1
320 TABLET ORAL DAILY
Status: DISCONTINUED | OUTPATIENT
Start: 2025-07-29 | End: 2025-08-09 | Stop reason: HOSPADM

## 2025-07-29 RX ORDER — CALCIUM CARBONATE 500 MG/1
2 TABLET, CHEWABLE ORAL 3 TIMES DAILY PRN
Status: DISCONTINUED | OUTPATIENT
Start: 2025-07-29 | End: 2025-08-09 | Stop reason: HOSPADM

## 2025-07-29 RX ORDER — ATORVASTATIN CALCIUM 10 MG/1
10 TABLET, FILM COATED ORAL NIGHTLY
COMMUNITY

## 2025-07-29 RX ORDER — GABAPENTIN 300 MG/1
300 CAPSULE ORAL NIGHTLY
Status: DISCONTINUED | OUTPATIENT
Start: 2025-07-29 | End: 2025-08-09 | Stop reason: HOSPADM

## 2025-07-29 RX ORDER — MESALAMINE 0.38 G/1
0.38 CAPSULE, EXTENDED RELEASE ORAL 4 TIMES DAILY
Status: DISCONTINUED | OUTPATIENT
Start: 2025-07-29 | End: 2025-08-09 | Stop reason: HOSPADM

## 2025-07-29 RX ORDER — FUROSEMIDE 40 MG/1
40 TABLET ORAL DAILY
Status: DISCONTINUED | OUTPATIENT
Start: 2025-07-29 | End: 2025-08-04

## 2025-07-29 RX ORDER — HYDRALAZINE HYDROCHLORIDE 50 MG/1
50 TABLET, FILM COATED ORAL 3 TIMES DAILY
Status: DISCONTINUED | OUTPATIENT
Start: 2025-07-29 | End: 2025-07-30

## 2025-07-29 RX ORDER — MESALAMINE 0.38 G/1
375 CAPSULE, EXTENDED RELEASE ORAL 4 TIMES DAILY
COMMUNITY

## 2025-07-29 RX ORDER — FLUTICASONE PROPIONATE 50 MCG
2 SPRAY, SUSPENSION (ML) NASAL DAILY PRN
Status: DISCONTINUED | OUTPATIENT
Start: 2025-07-29 | End: 2025-08-09 | Stop reason: HOSPADM

## 2025-07-29 RX ORDER — ALUMINA, MAGNESIA, AND SIMETHICONE 2400; 2400; 240 MG/30ML; MG/30ML; MG/30ML
15 SUSPENSION ORAL EVERY 6 HOURS PRN
Status: DISCONTINUED | OUTPATIENT
Start: 2025-07-29 | End: 2025-08-09 | Stop reason: HOSPADM

## 2025-07-29 RX ORDER — CLONIDINE HYDROCHLORIDE 0.3 MG/1
0.3 TABLET ORAL 3 TIMES DAILY
COMMUNITY

## 2025-07-29 RX ORDER — OXYCODONE AND ACETAMINOPHEN 7.5; 325 MG/1; MG/1
1 TABLET ORAL EVERY 8 HOURS PRN
Refills: 0 | Status: DISPENSED | OUTPATIENT
Start: 2025-07-29 | End: 2025-07-31

## 2025-07-29 RX ADMIN — CALCITRIOL 0.5 MCG: 0.25 CAPSULE ORAL at 09:15

## 2025-07-29 RX ADMIN — GABAPENTIN 300 MG: 300 CAPSULE ORAL at 20:06

## 2025-07-29 RX ADMIN — LEVOTHYROXINE SODIUM 100 MCG: 100 TABLET ORAL at 05:29

## 2025-07-29 RX ADMIN — ANTACID TABLETS 2 TABLET: 500 TABLET, CHEWABLE ORAL at 18:26

## 2025-07-29 RX ADMIN — ASPIRIN 81 MG CHEWABLE TABLET 81 MG: 81 TABLET CHEWABLE at 09:15

## 2025-07-29 RX ADMIN — PANTOPRAZOLE SODIUM 40 MG: 40 TABLET, DELAYED RELEASE ORAL at 05:29

## 2025-07-29 RX ADMIN — MONTELUKAST 10 MG: 10 TABLET, FILM COATED ORAL at 20:06

## 2025-07-29 RX ADMIN — Medication 10 ML: at 10:02

## 2025-07-29 RX ADMIN — IPRATROPIUM BROMIDE 0.5 MG: 0.5 SOLUTION RESPIRATORY (INHALATION) at 20:05

## 2025-07-29 RX ADMIN — CEFEPIME 2000 MG: 2 INJECTION, POWDER, FOR SOLUTION INTRAVENOUS at 20:06

## 2025-07-29 RX ADMIN — ACETAMINOPHEN 650 MG: 325 TABLET ORAL at 18:26

## 2025-07-29 RX ADMIN — BUDESONIDE 0.5 MG: 0.5 SUSPENSION RESPIRATORY (INHALATION) at 20:05

## 2025-07-29 RX ADMIN — ISOSORBIDE DINITRATE 10 MG: 10 TABLET ORAL at 09:16

## 2025-07-29 RX ADMIN — FUROSEMIDE 40 MG: 40 TABLET ORAL at 20:06

## 2025-07-29 RX ADMIN — MESALAMINE 0.38 G: 375 CAPSULE, EXTENDED RELEASE ORAL at 20:06

## 2025-07-29 RX ADMIN — CLOPIDOGREL BISULFATE 75 MG: 75 TABLET, FILM COATED ORAL at 09:15

## 2025-07-29 RX ADMIN — OXYCODONE HYDROCHLORIDE AND ACETAMINOPHEN 1 TABLET: 7.5; 325 TABLET ORAL at 05:29

## 2025-07-29 RX ADMIN — OXYCODONE HYDROCHLORIDE AND ACETAMINOPHEN 1 TABLET: 7.5; 325 TABLET ORAL at 21:04

## 2025-07-29 RX ADMIN — TAMSULOSIN HYDROCHLORIDE 0.4 MG: 0.4 CAPSULE ORAL at 20:06

## 2025-07-29 RX ADMIN — CARVEDILOL 12.5 MG: 6.25 TABLET, FILM COATED ORAL at 09:15

## 2025-07-29 RX ADMIN — ACETAMINOPHEN 650 MG: 325 TABLET ORAL at 10:00

## 2025-07-29 RX ADMIN — ARFORMOTEROL TARTRATE 15 MCG: 15 SOLUTION RESPIRATORY (INHALATION) at 20:05

## 2025-07-29 RX ADMIN — Medication 10 ML: at 20:06

## 2025-07-29 RX ADMIN — OXYCODONE HYDROCHLORIDE AND ACETAMINOPHEN 1 TABLET: 7.5; 325 TABLET ORAL at 13:00

## 2025-07-29 RX ADMIN — ATORVASTATIN CALCIUM 10 MG: 10 TABLET ORAL at 09:16

## 2025-07-30 ENCOUNTER — APPOINTMENT (OUTPATIENT)
Dept: GENERAL RADIOLOGY | Facility: HOSPITAL | Age: 75
DRG: 239 | End: 2025-07-30
Payer: MEDICARE

## 2025-07-30 PROBLEM — I73.9 PAD (PERIPHERAL ARTERY DISEASE): Status: ACTIVE | Noted: 2025-07-28

## 2025-07-30 PROBLEM — I73.9: Status: ACTIVE | Noted: 2025-07-30

## 2025-07-30 LAB
25(OH)D3 SERPL-MCNC: 57.7 NG/ML (ref 30–100)
ABO GROUP BLD: NORMAL
ANION GAP SERPL CALCULATED.3IONS-SCNC: 13 MMOL/L (ref 5–15)
BLD GP AB SCN SERPL QL: NEGATIVE
BUN SERPL-MCNC: 58.2 MG/DL (ref 8–23)
BUN/CREAT SERPL: 14.7 (ref 7–25)
CALCIUM SPEC-SCNC: 9.3 MG/DL (ref 8.6–10.5)
CHLORIDE SERPL-SCNC: 95 MMOL/L (ref 98–107)
CO2 SERPL-SCNC: 24 MMOL/L (ref 22–29)
CREAT SERPL-MCNC: 3.97 MG/DL (ref 0.76–1.27)
EGFRCR SERPLBLD CKD-EPI 2021: 14.8 ML/MIN/1.73
GLUCOSE SERPL-MCNC: 95 MG/DL (ref 65–99)
MAGNESIUM SERPL-MCNC: 1.7 MG/DL (ref 1.6–2.4)
PHOSPHATE SERPL-MCNC: 5.6 MG/DL (ref 2.5–4.5)
POTASSIUM SERPL-SCNC: 4.3 MMOL/L (ref 3.5–5.2)
PTH-INTACT SERPL-MCNC: 51 PG/ML (ref 15–65)
RH BLD: POSITIVE
SODIUM SERPL-SCNC: 132 MMOL/L (ref 136–145)
T&S EXPIRATION DATE: NORMAL

## 2025-07-30 PROCEDURE — 84100 ASSAY OF PHOSPHORUS: CPT | Performed by: INTERNAL MEDICINE

## 2025-07-30 PROCEDURE — 86850 RBC ANTIBODY SCREEN: CPT | Performed by: NURSE PRACTITIONER

## 2025-07-30 PROCEDURE — 86900 BLOOD TYPING SEROLOGIC ABO: CPT | Performed by: NURSE PRACTITIONER

## 2025-07-30 PROCEDURE — 80048 BASIC METABOLIC PNL TOTAL CA: CPT | Performed by: INTERNAL MEDICINE

## 2025-07-30 PROCEDURE — 86901 BLOOD TYPING SEROLOGIC RH(D): CPT

## 2025-07-30 PROCEDURE — 82306 VITAMIN D 25 HYDROXY: CPT | Performed by: INTERNAL MEDICINE

## 2025-07-30 PROCEDURE — 83970 ASSAY OF PARATHORMONE: CPT | Performed by: INTERNAL MEDICINE

## 2025-07-30 PROCEDURE — 25010000002 HEPARIN (PORCINE) PER 1000 UNITS: Performed by: INTERNAL MEDICINE

## 2025-07-30 PROCEDURE — 93010 ELECTROCARDIOGRAM REPORT: CPT | Performed by: STUDENT IN AN ORGANIZED HEALTH CARE EDUCATION/TRAINING PROGRAM

## 2025-07-30 PROCEDURE — 94664 DEMO&/EVAL PT USE INHALER: CPT

## 2025-07-30 PROCEDURE — 25010000002 CEFEPIME PER 500 MG

## 2025-07-30 PROCEDURE — 83735 ASSAY OF MAGNESIUM: CPT | Performed by: INTERNAL MEDICINE

## 2025-07-30 PROCEDURE — 94799 UNLISTED PULMONARY SVC/PX: CPT

## 2025-07-30 PROCEDURE — 93005 ELECTROCARDIOGRAM TRACING: CPT | Performed by: FAMILY MEDICINE

## 2025-07-30 PROCEDURE — 86900 BLOOD TYPING SEROLOGIC ABO: CPT

## 2025-07-30 PROCEDURE — 86901 BLOOD TYPING SEROLOGIC RH(D): CPT | Performed by: NURSE PRACTITIONER

## 2025-07-30 PROCEDURE — 5A1D70Z PERFORMANCE OF URINARY FILTRATION, INTERMITTENT, LESS THAN 6 HOURS PER DAY: ICD-10-PCS | Performed by: SURGERY

## 2025-07-30 PROCEDURE — 86923 COMPATIBILITY TEST ELECTRIC: CPT

## 2025-07-30 PROCEDURE — 71045 X-RAY EXAM CHEST 1 VIEW: CPT

## 2025-07-30 PROCEDURE — 99222 1ST HOSP IP/OBS MODERATE 55: CPT | Performed by: NURSE PRACTITIONER

## 2025-07-30 RX ORDER — HYDRALAZINE HYDROCHLORIDE 25 MG/1
25 TABLET, FILM COATED ORAL 3 TIMES DAILY
Status: DISCONTINUED | OUTPATIENT
Start: 2025-07-30 | End: 2025-08-09 | Stop reason: HOSPADM

## 2025-07-30 RX ORDER — HEPARIN SODIUM 1000 [USP'U]/ML
3200 INJECTION, SOLUTION INTRAVENOUS; SUBCUTANEOUS AS NEEDED
Status: DISCONTINUED | OUTPATIENT
Start: 2025-07-30 | End: 2025-08-09 | Stop reason: HOSPADM

## 2025-07-30 RX ORDER — ARFORMOTEROL TARTRATE 15 UG/2ML
15 SOLUTION RESPIRATORY (INHALATION)
Status: DISCONTINUED | OUTPATIENT
Start: 2025-07-30 | End: 2025-08-09 | Stop reason: HOSPADM

## 2025-07-30 RX ORDER — BUDESONIDE 0.5 MG/2ML
0.5 INHALANT ORAL
Status: DISCONTINUED | OUTPATIENT
Start: 2025-07-30 | End: 2025-08-09 | Stop reason: HOSPADM

## 2025-07-30 RX ORDER — CLONIDINE HYDROCHLORIDE 0.1 MG/1
0.1 TABLET ORAL 3 TIMES DAILY
Status: DISCONTINUED | OUTPATIENT
Start: 2025-07-30 | End: 2025-08-09 | Stop reason: HOSPADM

## 2025-07-30 RX ORDER — MIDODRINE HYDROCHLORIDE 2.5 MG/1
10 TABLET ORAL DAILY PRN
Status: DISCONTINUED | OUTPATIENT
Start: 2025-07-30 | End: 2025-08-09 | Stop reason: HOSPADM

## 2025-07-30 RX ORDER — HEPARIN SODIUM 1000 [USP'U]/ML
1800 INJECTION, SOLUTION INTRAVENOUS; SUBCUTANEOUS SEE ADMIN INSTRUCTIONS
Status: DISCONTINUED | OUTPATIENT
Start: 2025-07-30 | End: 2025-08-01

## 2025-07-30 RX ORDER — DEXTROSE MONOHYDRATE 25 G/50ML
25 INJECTION, SOLUTION INTRAVENOUS ONCE AS NEEDED
Status: DISCONTINUED | OUTPATIENT
Start: 2025-07-30 | End: 2025-08-09 | Stop reason: HOSPADM

## 2025-07-30 RX ORDER — SODIUM CHLORIDE 234 MG/ML
10 INJECTION, SOLUTION INTRAVENOUS
Status: DISCONTINUED | OUTPATIENT
Start: 2025-07-30 | End: 2025-08-09 | Stop reason: HOSPADM

## 2025-07-30 RX ORDER — LEVOTHYROXINE SODIUM 112 UG/1
112 TABLET ORAL
Status: DISCONTINUED | OUTPATIENT
Start: 2025-07-30 | End: 2025-08-09 | Stop reason: HOSPADM

## 2025-07-30 RX ADMIN — GABAPENTIN 300 MG: 300 CAPSULE ORAL at 20:24

## 2025-07-30 RX ADMIN — Medication 10 ML: at 20:24

## 2025-07-30 RX ADMIN — HYDRALAZINE HYDROCHLORIDE 25 MG: 25 TABLET ORAL at 17:57

## 2025-07-30 RX ADMIN — HYDRALAZINE HYDROCHLORIDE 50 MG: 50 TABLET ORAL at 08:18

## 2025-07-30 RX ADMIN — Medication 10 ML: at 08:18

## 2025-07-30 RX ADMIN — MESALAMINE 0.38 G: 375 CAPSULE, EXTENDED RELEASE ORAL at 17:58

## 2025-07-30 RX ADMIN — MESALAMINE 0.38 G: 375 CAPSULE, EXTENDED RELEASE ORAL at 20:24

## 2025-07-30 RX ADMIN — HEPARIN SODIUM 3200 UNITS: 1000 INJECTION, SOLUTION INTRAVENOUS; SUBCUTANEOUS at 16:35

## 2025-07-30 RX ADMIN — ASPIRIN 81 MG CHEWABLE TABLET 81 MG: 81 TABLET CHEWABLE at 08:26

## 2025-07-30 RX ADMIN — ANTACID TABLETS 2 TABLET: 500 TABLET, CHEWABLE ORAL at 10:33

## 2025-07-30 RX ADMIN — IPRATROPIUM BROMIDE 0.5 MG: 0.5 SOLUTION RESPIRATORY (INHALATION) at 06:27

## 2025-07-30 RX ADMIN — LEVOTHYROXINE SODIUM 112 MCG: 112 TABLET ORAL at 05:30

## 2025-07-30 RX ADMIN — ISOSORBIDE DINITRATE 10 MG: 10 TABLET ORAL at 08:18

## 2025-07-30 RX ADMIN — TAMSULOSIN HYDROCHLORIDE 0.4 MG: 0.4 CAPSULE ORAL at 20:24

## 2025-07-30 RX ADMIN — OXYCODONE HYDROCHLORIDE AND ACETAMINOPHEN 1 TABLET: 7.5; 325 TABLET ORAL at 05:19

## 2025-07-30 RX ADMIN — MONTELUKAST 10 MG: 10 TABLET, FILM COATED ORAL at 20:24

## 2025-07-30 RX ADMIN — ATORVASTATIN CALCIUM 10 MG: 10 TABLET ORAL at 08:18

## 2025-07-30 RX ADMIN — FUROSEMIDE 40 MG: 40 TABLET ORAL at 08:18

## 2025-07-30 RX ADMIN — MESALAMINE 0.38 G: 375 CAPSULE, EXTENDED RELEASE ORAL at 08:18

## 2025-07-30 RX ADMIN — ARFORMOTEROL TARTRATE 15 MCG: 15 SOLUTION RESPIRATORY (INHALATION) at 19:12

## 2025-07-30 RX ADMIN — MESALAMINE 0.38 G: 375 CAPSULE, EXTENDED RELEASE ORAL at 12:48

## 2025-07-30 RX ADMIN — PANTOPRAZOLE SODIUM 40 MG: 40 TABLET, DELAYED RELEASE ORAL at 05:30

## 2025-07-30 RX ADMIN — OXYCODONE HYDROCHLORIDE AND ACETAMINOPHEN 1 TABLET: 7.5; 325 TABLET ORAL at 17:58

## 2025-07-30 RX ADMIN — ISOSORBIDE DINITRATE 10 MG: 10 TABLET ORAL at 12:47

## 2025-07-30 RX ADMIN — CARVEDILOL 12.5 MG: 6.25 TABLET, FILM COATED ORAL at 08:18

## 2025-07-30 RX ADMIN — CLOPIDOGREL BISULFATE 75 MG: 75 TABLET, FILM COATED ORAL at 08:18

## 2025-07-30 RX ADMIN — BUDESONIDE 0.5 MG: 0.5 SUSPENSION RESPIRATORY (INHALATION) at 06:27

## 2025-07-30 RX ADMIN — BUDESONIDE 0.5 MG: 0.5 INHALANT RESPIRATORY (INHALATION) at 19:12

## 2025-07-30 RX ADMIN — CALCITRIOL 0.5 MCG: 0.25 CAPSULE ORAL at 08:18

## 2025-07-30 RX ADMIN — HYDRALAZINE HYDROCHLORIDE 25 MG: 25 TABLET ORAL at 20:24

## 2025-07-30 RX ADMIN — ISOSORBIDE DINITRATE 10 MG: 10 TABLET ORAL at 17:58

## 2025-07-30 RX ADMIN — ACETAMINOPHEN 650 MG: 325 TABLET ORAL at 22:07

## 2025-07-30 RX ADMIN — CARVEDILOL 12.5 MG: 6.25 TABLET, FILM COATED ORAL at 17:58

## 2025-07-30 RX ADMIN — IPRATROPIUM BROMIDE 0.5 MG: 0.5 SOLUTION RESPIRATORY (INHALATION) at 19:13

## 2025-07-30 RX ADMIN — CEFEPIME 2000 MG: 2 INJECTION, POWDER, FOR SOLUTION INTRAVENOUS at 20:24

## 2025-07-31 ENCOUNTER — ANESTHESIA (OUTPATIENT)
Dept: PERIOP | Facility: HOSPITAL | Age: 75
End: 2025-07-31
Payer: MEDICARE

## 2025-07-31 ENCOUNTER — ANESTHESIA EVENT (OUTPATIENT)
Dept: PERIOP | Facility: HOSPITAL | Age: 75
End: 2025-07-31
Payer: MEDICARE

## 2025-07-31 LAB
ANION GAP SERPL CALCULATED.3IONS-SCNC: 10 MMOL/L (ref 5–15)
BACTERIA SPEC AEROBE CULT: ABNORMAL
BUN SERPL-MCNC: 30.2 MG/DL (ref 8–23)
BUN/CREAT SERPL: 12.4 (ref 7–25)
CALCIUM SPEC-SCNC: 9.1 MG/DL (ref 8.6–10.5)
CHLORIDE SERPL-SCNC: 99 MMOL/L (ref 98–107)
CO2 SERPL-SCNC: 26 MMOL/L (ref 22–29)
CREAT SERPL-MCNC: 2.44 MG/DL (ref 0.76–1.27)
EGFRCR SERPLBLD CKD-EPI 2021: 26.6 ML/MIN/1.73
GLUCOSE SERPL-MCNC: 94 MG/DL (ref 65–99)
POTASSIUM SERPL-SCNC: 4.2 MMOL/L (ref 3.5–5.2)
QT INTERVAL: 460 MS
QTC INTERVAL: 474 MS
SODIUM SERPL-SCNC: 135 MMOL/L (ref 136–145)

## 2025-07-31 PROCEDURE — 25010000002 PROPOFOL 10 MG/ML EMULSION: Performed by: NURSE ANESTHETIST, CERTIFIED REGISTERED

## 2025-07-31 PROCEDURE — 25010000002 CEFAZOLIN PER 500 MG: Performed by: NURSE ANESTHETIST, CERTIFIED REGISTERED

## 2025-07-31 PROCEDURE — 25010000002 ENOXAPARIN PER 10 MG: Performed by: SURGERY

## 2025-07-31 PROCEDURE — 0Y6D0Z3 DETACHMENT AT LEFT UPPER LEG, LOW, OPEN APPROACH: ICD-10-PCS | Performed by: SURGERY

## 2025-07-31 PROCEDURE — 25010000002 CEFEPIME PER 500 MG: Performed by: SURGERY

## 2025-07-31 PROCEDURE — 25010000002 LIDOCAINE PF 2% 2 % SOLUTION: Performed by: NURSE ANESTHETIST, CERTIFIED REGISTERED

## 2025-07-31 PROCEDURE — 94799 UNLISTED PULMONARY SVC/PX: CPT

## 2025-07-31 PROCEDURE — 25010000002 GLYCOPYRROLATE 0.4 MG/2ML SOLUTION: Performed by: NURSE ANESTHETIST, CERTIFIED REGISTERED

## 2025-07-31 PROCEDURE — 88307 TISSUE EXAM BY PATHOLOGIST: CPT | Performed by: SURGERY

## 2025-07-31 PROCEDURE — 94761 N-INVAS EAR/PLS OXIMETRY MLT: CPT

## 2025-07-31 PROCEDURE — 25010000002 HYDROMORPHONE PER 4 MG: Performed by: ANESTHESIOLOGY

## 2025-07-31 PROCEDURE — 25010000002 CEFAZOLIN PER 500 MG: Performed by: SURGERY

## 2025-07-31 PROCEDURE — 25810000003 SODIUM CHLORIDE 0.9 % SOLUTION: Performed by: SURGERY

## 2025-07-31 PROCEDURE — 25010000002 FENTANYL CITRATE (PF) 50 MCG/ML SOLUTION: Performed by: ANESTHESIOLOGY

## 2025-07-31 PROCEDURE — 27590 AMPUTATE LEG AT THIGH: CPT | Performed by: SURGERY

## 2025-07-31 PROCEDURE — 94664 DEMO&/EVAL PT USE INHALER: CPT

## 2025-07-31 PROCEDURE — 25010000002 HYDROMORPHONE 1 MG/ML SOLUTION: Performed by: NURSE ANESTHETIST, CERTIFIED REGISTERED

## 2025-07-31 PROCEDURE — 25010000002 FENTANYL CITRATE (PF) 100 MCG/2ML SOLUTION: Performed by: NURSE ANESTHETIST, CERTIFIED REGISTERED

## 2025-07-31 PROCEDURE — 80048 BASIC METABOLIC PNL TOTAL CA: CPT | Performed by: INTERNAL MEDICINE

## 2025-07-31 PROCEDURE — 25010000002 HYDRALAZINE PER 20 MG: Performed by: STUDENT IN AN ORGANIZED HEALTH CARE EDUCATION/TRAINING PROGRAM

## 2025-07-31 RX ORDER — LIDOCAINE HYDROCHLORIDE 10 MG/ML
0.5 INJECTION, SOLUTION EPIDURAL; INFILTRATION; INTRACAUDAL; PERINEURAL ONCE AS NEEDED
Status: DISCONTINUED | OUTPATIENT
Start: 2025-07-31 | End: 2025-07-31 | Stop reason: HOSPADM

## 2025-07-31 RX ORDER — FLUMAZENIL 0.1 MG/ML
0.2 INJECTION INTRAVENOUS AS NEEDED
Status: DISCONTINUED | OUTPATIENT
Start: 2025-07-31 | End: 2025-07-31 | Stop reason: HOSPADM

## 2025-07-31 RX ORDER — NALOXONE HCL 0.4 MG/ML
0.1 VIAL (ML) INJECTION
Status: DISCONTINUED | OUTPATIENT
Start: 2025-07-31 | End: 2025-08-09 | Stop reason: HOSPADM

## 2025-07-31 RX ORDER — ACETAMINOPHEN 325 MG/1
650 TABLET ORAL EVERY 8 HOURS
Status: DISPENSED | OUTPATIENT
Start: 2025-07-31 | End: 2025-08-02

## 2025-07-31 RX ORDER — SODIUM CHLORIDE 0.9 % (FLUSH) 0.9 %
10 SYRINGE (ML) INJECTION EVERY 12 HOURS SCHEDULED
Status: DISCONTINUED | OUTPATIENT
Start: 2025-07-31 | End: 2025-08-09 | Stop reason: HOSPADM

## 2025-07-31 RX ORDER — SODIUM CHLORIDE, SODIUM LACTATE, POTASSIUM CHLORIDE, CALCIUM CHLORIDE 600; 310; 30; 20 MG/100ML; MG/100ML; MG/100ML; MG/100ML
100 INJECTION, SOLUTION INTRAVENOUS CONTINUOUS
Status: DISCONTINUED | OUTPATIENT
Start: 2025-07-31 | End: 2025-07-31

## 2025-07-31 RX ORDER — HYDROMORPHONE HCL/0.9% NACL/PF 6 MG/30 ML
PATIENT CONTROLLED ANALGESIA SYRINGE INTRAVENOUS CONTINUOUS
Refills: 0 | Status: DISCONTINUED | OUTPATIENT
Start: 2025-07-31 | End: 2025-08-03

## 2025-07-31 RX ORDER — ROCURONIUM BROMIDE 10 MG/ML
INJECTION, SOLUTION INTRAVENOUS AS NEEDED
Status: DISCONTINUED | OUTPATIENT
Start: 2025-07-31 | End: 2025-07-31 | Stop reason: SURG

## 2025-07-31 RX ORDER — NEOSTIGMINE METHYLSULFATE 5 MG/5 ML
SYRINGE (ML) INTRAVENOUS AS NEEDED
Status: DISCONTINUED | OUTPATIENT
Start: 2025-07-31 | End: 2025-07-31 | Stop reason: SURG

## 2025-07-31 RX ORDER — NALOXONE HCL 0.4 MG/ML
0.4 VIAL (ML) INJECTION AS NEEDED
Status: DISCONTINUED | OUTPATIENT
Start: 2025-07-31 | End: 2025-07-31 | Stop reason: HOSPADM

## 2025-07-31 RX ORDER — CEFAZOLIN SODIUM 1 G/3ML
INJECTION, POWDER, FOR SOLUTION INTRAMUSCULAR; INTRAVENOUS AS NEEDED
Status: DISCONTINUED | OUTPATIENT
Start: 2025-07-31 | End: 2025-07-31 | Stop reason: SURG

## 2025-07-31 RX ORDER — SODIUM CHLORIDE 0.9 % (FLUSH) 0.9 %
3 SYRINGE (ML) INJECTION EVERY 12 HOURS SCHEDULED
Status: DISCONTINUED | OUTPATIENT
Start: 2025-07-31 | End: 2025-07-31 | Stop reason: HOSPADM

## 2025-07-31 RX ORDER — LIDOCAINE HYDROCHLORIDE 20 MG/ML
INJECTION, SOLUTION EPIDURAL; INFILTRATION; INTRACAUDAL; PERINEURAL AS NEEDED
Status: DISCONTINUED | OUTPATIENT
Start: 2025-07-31 | End: 2025-07-31 | Stop reason: SURG

## 2025-07-31 RX ORDER — PROPOFOL 10 MG/ML
VIAL (ML) INTRAVENOUS AS NEEDED
Status: DISCONTINUED | OUTPATIENT
Start: 2025-07-31 | End: 2025-07-31 | Stop reason: SURG

## 2025-07-31 RX ORDER — FENTANYL CITRATE 50 UG/ML
50 INJECTION, SOLUTION INTRAMUSCULAR; INTRAVENOUS
Status: DISCONTINUED | OUTPATIENT
Start: 2025-07-31 | End: 2025-07-31 | Stop reason: HOSPADM

## 2025-07-31 RX ORDER — ENOXAPARIN SODIUM 100 MG/ML
30 INJECTION SUBCUTANEOUS EVERY 24 HOURS
Status: DISCONTINUED | OUTPATIENT
Start: 2025-07-31 | End: 2025-08-09 | Stop reason: HOSPADM

## 2025-07-31 RX ORDER — SODIUM CHLORIDE 0.9 % (FLUSH) 0.9 %
3-10 SYRINGE (ML) INJECTION AS NEEDED
Status: DISCONTINUED | OUTPATIENT
Start: 2025-07-31 | End: 2025-07-31 | Stop reason: HOSPADM

## 2025-07-31 RX ORDER — ACETAMINOPHEN 160 MG/5ML
650 SOLUTION ORAL EVERY 8 HOURS
Status: ACTIVE | OUTPATIENT
Start: 2025-07-31 | End: 2025-08-02

## 2025-07-31 RX ORDER — GLYCOPYRROLATE 0.2 MG/ML
INJECTION INTRAMUSCULAR; INTRAVENOUS AS NEEDED
Status: DISCONTINUED | OUTPATIENT
Start: 2025-07-31 | End: 2025-07-31 | Stop reason: SURG

## 2025-07-31 RX ORDER — SODIUM CHLORIDE 9 MG/ML
40 INJECTION, SOLUTION INTRAVENOUS AS NEEDED
Status: DISCONTINUED | OUTPATIENT
Start: 2025-07-31 | End: 2025-07-31 | Stop reason: HOSPADM

## 2025-07-31 RX ORDER — SODIUM CHLORIDE 9 MG/ML
40 INJECTION, SOLUTION INTRAVENOUS AS NEEDED
Status: DISCONTINUED | OUTPATIENT
Start: 2025-07-31 | End: 2025-08-09 | Stop reason: HOSPADM

## 2025-07-31 RX ORDER — OXYCODONE AND ACETAMINOPHEN 7.5; 325 MG/1; MG/1
1 TABLET ORAL ONCE AS NEEDED
Status: COMPLETED | OUTPATIENT
Start: 2025-07-31 | End: 2025-07-31

## 2025-07-31 RX ORDER — SODIUM CHLORIDE 9 MG/ML
30 INJECTION, SOLUTION INTRAVENOUS CONTINUOUS PRN
Status: DISCONTINUED | OUTPATIENT
Start: 2025-07-31 | End: 2025-08-03

## 2025-07-31 RX ORDER — SODIUM CHLORIDE 0.9 % (FLUSH) 0.9 %
10 SYRINGE (ML) INJECTION AS NEEDED
Status: DISCONTINUED | OUTPATIENT
Start: 2025-07-31 | End: 2025-08-09 | Stop reason: HOSPADM

## 2025-07-31 RX ORDER — SODIUM CHLORIDE 0.9 % (FLUSH) 0.9 %
3 SYRINGE (ML) INJECTION AS NEEDED
Status: DISCONTINUED | OUTPATIENT
Start: 2025-07-31 | End: 2025-07-31 | Stop reason: HOSPADM

## 2025-07-31 RX ORDER — FENTANYL CITRATE 50 UG/ML
INJECTION, SOLUTION INTRAMUSCULAR; INTRAVENOUS AS NEEDED
Status: DISCONTINUED | OUTPATIENT
Start: 2025-07-31 | End: 2025-07-31 | Stop reason: SURG

## 2025-07-31 RX ORDER — LABETALOL HYDROCHLORIDE 5 MG/ML
5 INJECTION, SOLUTION INTRAVENOUS
Status: DISCONTINUED | OUTPATIENT
Start: 2025-07-31 | End: 2025-07-31 | Stop reason: HOSPADM

## 2025-07-31 RX ORDER — HYDROMORPHONE HYDROCHLORIDE 1 MG/ML
0.25 INJECTION, SOLUTION INTRAMUSCULAR; INTRAVENOUS; SUBCUTANEOUS
Status: DISCONTINUED | OUTPATIENT
Start: 2025-07-31 | End: 2025-07-31 | Stop reason: HOSPADM

## 2025-07-31 RX ORDER — SODIUM CHLORIDE 9 MG/ML
500 INJECTION, SOLUTION INTRAVENOUS CONTINUOUS
Status: DISPENSED | OUTPATIENT
Start: 2025-07-31 | End: 2025-08-01

## 2025-07-31 RX ORDER — HYDRALAZINE HYDROCHLORIDE 20 MG/ML
10 INJECTION INTRAMUSCULAR; INTRAVENOUS EVERY 6 HOURS PRN
Status: DISCONTINUED | OUTPATIENT
Start: 2025-07-31 | End: 2025-08-09 | Stop reason: HOSPADM

## 2025-07-31 RX ADMIN — PROPOFOL 75 MG: 10 INJECTION, EMULSION INTRAVENOUS at 13:49

## 2025-07-31 RX ADMIN — CEFAZOLIN 2000 MG: 2 INJECTION, POWDER, FOR SOLUTION INTRAMUSCULAR; INTRAVENOUS at 22:00

## 2025-07-31 RX ADMIN — TAMSULOSIN HYDROCHLORIDE 0.4 MG: 0.4 CAPSULE ORAL at 20:53

## 2025-07-31 RX ADMIN — OXYCODONE HYDROCHLORIDE AND ACETAMINOPHEN 1 TABLET: 7.5; 325 TABLET ORAL at 02:17

## 2025-07-31 RX ADMIN — SODIUM CHLORIDE 500 ML: 9 INJECTION, SOLUTION INTRAVENOUS at 13:27

## 2025-07-31 RX ADMIN — MONTELUKAST 10 MG: 10 TABLET, FILM COATED ORAL at 20:53

## 2025-07-31 RX ADMIN — Medication 3 MG: at 14:57

## 2025-07-31 RX ADMIN — Medication 10 ML: at 10:03

## 2025-07-31 RX ADMIN — HYDRALAZINE HYDROCHLORIDE 25 MG: 25 TABLET ORAL at 20:54

## 2025-07-31 RX ADMIN — HYDROMORPHONE HYDROCHLORIDE 0.25 MG: 1 INJECTION, SOLUTION INTRAMUSCULAR; INTRAVENOUS; SUBCUTANEOUS at 15:51

## 2025-07-31 RX ADMIN — ARFORMOTEROL TARTRATE 15 MCG: 15 SOLUTION RESPIRATORY (INHALATION) at 06:17

## 2025-07-31 RX ADMIN — MESALAMINE 0.38 G: 375 CAPSULE, EXTENDED RELEASE ORAL at 20:54

## 2025-07-31 RX ADMIN — CEFEPIME 2000 MG: 2 INJECTION, POWDER, FOR SOLUTION INTRAVENOUS at 20:55

## 2025-07-31 RX ADMIN — CEFAZOLIN 2 G: 1 INJECTION, POWDER, FOR SOLUTION INTRAMUSCULAR; INTRAVENOUS at 13:57

## 2025-07-31 RX ADMIN — PROPOFOL 25 MG: 10 INJECTION, EMULSION INTRAVENOUS at 13:53

## 2025-07-31 RX ADMIN — SODIUM CHLORIDE 30 ML/HR: 9 INJECTION, SOLUTION INTRAVENOUS at 17:54

## 2025-07-31 RX ADMIN — FENTANYL CITRATE 50 MCG: 50 INJECTION, SOLUTION INTRAMUSCULAR; INTRAVENOUS at 13:54

## 2025-07-31 RX ADMIN — BUDESONIDE 0.5 MG: 0.5 INHALANT RESPIRATORY (INHALATION) at 19:44

## 2025-07-31 RX ADMIN — ROCURONIUM 30 MG: 50 INJECTION, SOLUTION INTRAVENOUS at 13:49

## 2025-07-31 RX ADMIN — Medication: at 17:58

## 2025-07-31 RX ADMIN — FENTANYL CITRATE 50 MCG: 50 INJECTION, SOLUTION INTRAMUSCULAR; INTRAVENOUS at 15:59

## 2025-07-31 RX ADMIN — Medication 10 ML: at 20:54

## 2025-07-31 RX ADMIN — LIDOCAINE HYDROCHLORIDE 50 MG: 20 INJECTION, SOLUTION EPIDURAL; INFILTRATION; INTRACAUDAL; PERINEURAL at 13:49

## 2025-07-31 RX ADMIN — OXYCODONE HYDROCHLORIDE AND ACETAMINOPHEN 1 TABLET: 7.5; 325 TABLET ORAL at 16:15

## 2025-07-31 RX ADMIN — HYDROMORPHONE HYDROCHLORIDE 0.25 MG: 1 INJECTION, SOLUTION INTRAMUSCULAR; INTRAVENOUS; SUBCUTANEOUS at 14:45

## 2025-07-31 RX ADMIN — PROPOFOL 50 MG: 10 INJECTION, EMULSION INTRAVENOUS at 14:07

## 2025-07-31 RX ADMIN — GABAPENTIN 300 MG: 300 CAPSULE ORAL at 20:53

## 2025-07-31 RX ADMIN — ARFORMOTEROL TARTRATE 15 MCG: 15 SOLUTION RESPIRATORY (INHALATION) at 19:44

## 2025-07-31 RX ADMIN — ENOXAPARIN SODIUM 30 MG: 100 INJECTION SUBCUTANEOUS at 18:14

## 2025-07-31 RX ADMIN — FENTANYL CITRATE 50 MCG: 50 INJECTION, SOLUTION INTRAMUSCULAR; INTRAVENOUS at 14:07

## 2025-07-31 RX ADMIN — HYDRALAZINE HYDROCHLORIDE 10 MG: 20 INJECTION INTRAMUSCULAR; INTRAVENOUS at 10:03

## 2025-07-31 RX ADMIN — PROPOFOL 50 MG: 10 INJECTION, EMULSION INTRAVENOUS at 14:27

## 2025-07-31 RX ADMIN — GLYCOPYRROLATE 0.4 MG: 0.2 INJECTION INTRAMUSCULAR; INTRAVENOUS at 14:57

## 2025-07-31 RX ADMIN — BUDESONIDE 0.5 MG: 0.5 INHALANT RESPIRATORY (INHALATION) at 06:10

## 2025-07-31 NOTE — ANESTHESIA PREPROCEDURE EVALUATION
Anesthesia Evaluation     Patient summary reviewed   no history of anesthetic complications:   NPO Solid Status: > 8 hours  NPO Liquid Status: > 8 hours           Airway   Mallampati: II  Small opening  Dental    (+) edentulous    Pulmonary    (+) a smoker Former, COPD,sleep apnea  (-) asthma  Cardiovascular     (+) hypertension, valvular problems/murmurs murmur and TI, CAD, CABG, CHF , PVD, hyperlipidemia,  carotid artery disease right carotid  (-) past MI, angina    ROS comment: ·  Limited study for left ventricular function assessment.  ·  Left ventricular systolic function is normal. Left ventricular ejection fraction appears to be 56 - 60%.  ·  Left ventricular wall thickness is consistent with severe concentric hypertrophy.        Neuro/Psych- negative ROS  (-) seizures, TIA, CVA  GI/Hepatic/Renal/Endo    (+) GERD, renal disease (last dialysis 07/30)- CRI, ESRD and dialysis, thyroid problem hypothyroidism  (-) liver disease, diabetes    Musculoskeletal     Abdominal    Substance History      OB/GYN          Other   blood dyscrasia anemia thrombocytopenia,   history of cancer                  Anesthesia Plan    ASA 4     general     (Rescinded DNR intra-op, discussed at bedside with Victorina Leblanc RN as witness)  intravenous induction     Anesthetic plan, risks, benefits, and alternatives have been provided, discussed and informed consent has been obtained with: patient.    CODE STATUS:    Code Status (Patient has no pulse and is not breathing): No CPR (Do Not Attempt to Resuscitate)  Medical Interventions (Patient has pulse or is breathing): Limited Support  Medical Intervention Limits: No intubation (DNI)   No artificial nutrition  Comments: ACP living will with DNR order in EHR  Level Of Support Discussed With: Patient

## 2025-07-31 NOTE — ANESTHESIA POSTPROCEDURE EVALUATION
"Patient: Ricardo Hugo    Procedure Summary       Date: 07/31/25 Room / Location:  [facility]  /  [facility]    Anesthesia Start: 1346 Anesthesia Stop: 1505    Procedure: LEFT AMPUTATION ABOVE KNEE (Left: Thigh) Diagnosis:       PAD (peripheral artery disease)      (PAD (peripheral artery disease) [I73.9])    Surgeons: Gil Pineda DO Provider: Tahira Amato CRNA    Anesthesia Type: general ASA Status: 4            Anesthesia Type: general    Vitals  Vitals Value Taken Time   /45 07/31/25 16:26   Temp 97.9 °F (36.6 °C) 07/31/25 16:25   Pulse 58 07/31/25 16:27   Resp 17 07/31/25 16:20   SpO2 95 % 07/31/25 16:27   Vitals shown include unfiled device data.        Post Anesthesia Care and Evaluation    Patient location during evaluation: PACU  Patient participation: complete - patient participated  Level of consciousness: awake and alert  Pain management: adequate    Airway patency: patent  Anesthetic complications: No anesthetic complications    Cardiovascular status: acceptable  Respiratory status: acceptable  Hydration status: acceptable    Comments: Blood pressure 107/45, pulse 59, temperature 97.9 °F (36.6 °C), resp. rate 17, height 182.9 cm (72.01\"), weight 51.7 kg (114 lb), SpO2 96%.    Pt discharged from PACU based on jennifer score >8  No anesthesia care post op    "

## 2025-07-31 NOTE — ANESTHESIA PROCEDURE NOTES
Airway  Reason: elective    Date/Time: 7/31/2025 1:51 PM  Airway not difficult    General Information and Staff    Patient location during procedure: OR  CRNA/CAA: Tahira Amato CRNA    Indications and Patient Condition  Indications for airway management: airway protection    Preoxygenated: yes    Mask difficulty assessment: 1 - vent by mask    Final Airway Details    Final airway type: endotracheal airway      Successful airway: ETT  Cuffed: yes   Successful intubation technique: direct laryngoscopy  Adjuncts used in placement: intubating stylet  Endotracheal tube insertion site: oral  Blade: Mckenzie  Blade size: 2  ETT size (mm): 8.0  Cormack-Lehane Classification: grade I - full view of glottis  Placement verified by: chest auscultation and capnometry   Cuff volume (mL): 10  Measured from: lips  ETT/EBT  to lips (cm): 22  Number of attempts at approach: 1  Assessment: lips, teeth, and gum same as pre-op and atraumatic intubation    Additional Comments  atraumatic

## 2025-08-01 LAB
ANION GAP SERPL CALCULATED.3IONS-SCNC: 16 MMOL/L (ref 5–15)
BUN SERPL-MCNC: 44 MG/DL (ref 8–23)
BUN/CREAT SERPL: 12.2 (ref 7–25)
CALCIUM SPEC-SCNC: 9.1 MG/DL (ref 8.6–10.5)
CHLORIDE SERPL-SCNC: 101 MMOL/L (ref 98–107)
CO2 SERPL-SCNC: 20 MMOL/L (ref 22–29)
CREAT SERPL-MCNC: 3.62 MG/DL (ref 0.76–1.27)
DEPRECATED RDW RBC AUTO: 75.7 FL (ref 37–54)
EGFRCR SERPLBLD CKD-EPI 2021: 16.6 ML/MIN/1.73
ERYTHROCYTE [DISTWIDTH] IN BLOOD BY AUTOMATED COUNT: 19.2 % (ref 12.3–15.4)
GLUCOSE SERPL-MCNC: 101 MG/DL (ref 65–99)
HCT VFR BLD AUTO: 20.4 % (ref 37.5–51)
HGB BLD-MCNC: 6.1 G/DL (ref 13–17.7)
MCH RBC QN AUTO: 32.4 PG (ref 26.6–33)
MCHC RBC AUTO-ENTMCNC: 29.9 G/DL (ref 31.5–35.7)
MCV RBC AUTO: 108.5 FL (ref 79–97)
PLATELET # BLD AUTO: 103 10*3/MM3 (ref 140–450)
PMV BLD AUTO: 9.6 FL (ref 6–12)
POTASSIUM SERPL-SCNC: 4.9 MMOL/L (ref 3.5–5.2)
RBC # BLD AUTO: 1.88 10*6/MM3 (ref 4.14–5.8)
SODIUM SERPL-SCNC: 137 MMOL/L (ref 136–145)
WBC NRBC COR # BLD AUTO: 10.02 10*3/MM3 (ref 3.4–10.8)

## 2025-08-01 PROCEDURE — 25010000002 CEFEPIME PER 500 MG: Performed by: SURGERY

## 2025-08-01 PROCEDURE — 94761 N-INVAS EAR/PLS OXIMETRY MLT: CPT

## 2025-08-01 PROCEDURE — 99024 POSTOP FOLLOW-UP VISIT: CPT | Performed by: NURSE PRACTITIONER

## 2025-08-01 PROCEDURE — 25010000002 HEPARIN (PORCINE) PER 1000 UNITS: Performed by: SURGERY

## 2025-08-01 PROCEDURE — 80048 BASIC METABOLIC PNL TOTAL CA: CPT | Performed by: SURGERY

## 2025-08-01 PROCEDURE — P9016 RBC LEUKOCYTES REDUCED: HCPCS

## 2025-08-01 PROCEDURE — 94799 UNLISTED PULMONARY SVC/PX: CPT

## 2025-08-01 PROCEDURE — 85027 COMPLETE CBC AUTOMATED: CPT | Performed by: STUDENT IN AN ORGANIZED HEALTH CARE EDUCATION/TRAINING PROGRAM

## 2025-08-01 PROCEDURE — 25010000002 CEFAZOLIN PER 500 MG: Performed by: SURGERY

## 2025-08-01 PROCEDURE — 94664 DEMO&/EVAL PT USE INHALER: CPT

## 2025-08-01 PROCEDURE — 86900 BLOOD TYPING SEROLOGIC ABO: CPT

## 2025-08-01 PROCEDURE — 25010000002 ENOXAPARIN PER 10 MG: Performed by: SURGERY

## 2025-08-01 RX ADMIN — Medication: at 05:25

## 2025-08-01 RX ADMIN — ENOXAPARIN SODIUM 30 MG: 100 INJECTION SUBCUTANEOUS at 18:19

## 2025-08-01 RX ADMIN — BUDESONIDE 0.5 MG: 0.5 INHALANT RESPIRATORY (INHALATION) at 19:36

## 2025-08-01 RX ADMIN — CARVEDILOL 12.5 MG: 6.25 TABLET, FILM COATED ORAL at 18:18

## 2025-08-01 RX ADMIN — BUDESONIDE 0.5 MG: 0.5 INHALANT RESPIRATORY (INHALATION) at 06:18

## 2025-08-01 RX ADMIN — Medication 10 ML: at 20:45

## 2025-08-01 RX ADMIN — HEPARIN SODIUM 3200 UNITS: 1000 INJECTION, SOLUTION INTRAVENOUS; SUBCUTANEOUS at 13:03

## 2025-08-01 RX ADMIN — ARFORMOTEROL TARTRATE 15 MCG: 15 SOLUTION RESPIRATORY (INHALATION) at 06:18

## 2025-08-01 RX ADMIN — FUROSEMIDE 40 MG: 40 TABLET ORAL at 15:53

## 2025-08-01 RX ADMIN — ACETAMINOPHEN 650 MG: 325 TABLET ORAL at 18:18

## 2025-08-01 RX ADMIN — Medication 10 ML: at 09:00

## 2025-08-01 RX ADMIN — CALCITRIOL 0.5 MCG: 0.25 CAPSULE ORAL at 15:52

## 2025-08-01 RX ADMIN — IPRATROPIUM BROMIDE AND ALBUTEROL SULFATE 3 ML: .5; 3 SOLUTION RESPIRATORY (INHALATION) at 17:20

## 2025-08-01 RX ADMIN — Medication: at 20:41

## 2025-08-01 RX ADMIN — ATORVASTATIN CALCIUM 10 MG: 10 TABLET ORAL at 15:52

## 2025-08-01 RX ADMIN — ISOSORBIDE DINITRATE 10 MG: 10 TABLET ORAL at 18:18

## 2025-08-01 RX ADMIN — HYDRALAZINE HYDROCHLORIDE 25 MG: 25 TABLET ORAL at 15:55

## 2025-08-01 RX ADMIN — CEFAZOLIN 2000 MG: 2 INJECTION, POWDER, FOR SOLUTION INTRAMUSCULAR; INTRAVENOUS at 05:59

## 2025-08-01 RX ADMIN — ARFORMOTEROL TARTRATE 15 MCG: 15 SOLUTION RESPIRATORY (INHALATION) at 19:36

## 2025-08-01 RX ADMIN — CLOPIDOGREL BISULFATE 75 MG: 75 TABLET, FILM COATED ORAL at 15:52

## 2025-08-01 RX ADMIN — CEFEPIME 2000 MG: 2 INJECTION, POWDER, FOR SOLUTION INTRAVENOUS at 20:39

## 2025-08-01 RX ADMIN — ASPIRIN 81 MG CHEWABLE TABLET 81 MG: 81 TABLET CHEWABLE at 15:53

## 2025-08-02 LAB
ANION GAP SERPL CALCULATED.3IONS-SCNC: 14 MMOL/L (ref 5–15)
BACTERIA SPEC AEROBE CULT: NORMAL
BACTERIA SPEC AEROBE CULT: NORMAL
BH BB BLOOD EXPIRATION DATE: NORMAL
BH BB BLOOD TYPE BARCODE: 9500
BH BB DISPENSE STATUS: NORMAL
BH BB PRODUCT CODE: NORMAL
BH BB UNIT NUMBER: NORMAL
BUN SERPL-MCNC: 25 MG/DL (ref 8–23)
BUN/CREAT SERPL: 9.5 (ref 7–25)
CALCIUM SPEC-SCNC: 8.7 MG/DL (ref 8.6–10.5)
CHLORIDE SERPL-SCNC: 99 MMOL/L (ref 98–107)
CO2 SERPL-SCNC: 24 MMOL/L (ref 22–29)
CREAT SERPL-MCNC: 2.62 MG/DL (ref 0.76–1.27)
CROSSMATCH INTERPRETATION: NORMAL
DEPRECATED RDW RBC AUTO: 71.4 FL (ref 37–54)
EGFRCR SERPLBLD CKD-EPI 2021: 24.4 ML/MIN/1.73
ERYTHROCYTE [DISTWIDTH] IN BLOOD BY AUTOMATED COUNT: 18.8 % (ref 12.3–15.4)
GLUCOSE SERPL-MCNC: 108 MG/DL (ref 65–99)
HCT VFR BLD AUTO: 20.8 % (ref 37.5–51)
HCT VFR BLD AUTO: 23 % (ref 37.5–51)
HGB BLD-MCNC: 6.4 G/DL (ref 13–17.7)
HGB BLD-MCNC: 7.2 G/DL (ref 13–17.7)
MCH RBC QN AUTO: 32.2 PG (ref 26.6–33)
MCHC RBC AUTO-ENTMCNC: 30.8 G/DL (ref 31.5–35.7)
MCV RBC AUTO: 104.5 FL (ref 79–97)
PLATELET # BLD AUTO: 97 10*3/MM3 (ref 140–450)
PMV BLD AUTO: 10.5 FL (ref 6–12)
POTASSIUM SERPL-SCNC: 3.6 MMOL/L (ref 3.5–5.2)
RBC # BLD AUTO: 1.99 10*6/MM3 (ref 4.14–5.8)
SODIUM SERPL-SCNC: 137 MMOL/L (ref 136–145)
UNIT  ABO: NORMAL
UNIT  RH: NORMAL
WBC NRBC COR # BLD AUTO: 12.33 10*3/MM3 (ref 3.4–10.8)

## 2025-08-02 PROCEDURE — 25010000002 CEFEPIME PER 500 MG: Performed by: STUDENT IN AN ORGANIZED HEALTH CARE EDUCATION/TRAINING PROGRAM

## 2025-08-02 PROCEDURE — 94799 UNLISTED PULMONARY SVC/PX: CPT

## 2025-08-02 PROCEDURE — 85014 HEMATOCRIT: CPT | Performed by: STUDENT IN AN ORGANIZED HEALTH CARE EDUCATION/TRAINING PROGRAM

## 2025-08-02 PROCEDURE — 85018 HEMOGLOBIN: CPT | Performed by: STUDENT IN AN ORGANIZED HEALTH CARE EDUCATION/TRAINING PROGRAM

## 2025-08-02 PROCEDURE — 80048 BASIC METABOLIC PNL TOTAL CA: CPT | Performed by: SURGERY

## 2025-08-02 PROCEDURE — 85027 COMPLETE CBC AUTOMATED: CPT | Performed by: STUDENT IN AN ORGANIZED HEALTH CARE EDUCATION/TRAINING PROGRAM

## 2025-08-02 PROCEDURE — 36430 TRANSFUSION BLD/BLD COMPNT: CPT

## 2025-08-02 PROCEDURE — 94761 N-INVAS EAR/PLS OXIMETRY MLT: CPT

## 2025-08-02 PROCEDURE — 25010000002 ENOXAPARIN PER 10 MG: Performed by: SURGERY

## 2025-08-02 PROCEDURE — P9016 RBC LEUKOCYTES REDUCED: HCPCS

## 2025-08-02 PROCEDURE — 86923 COMPATIBILITY TEST ELECTRIC: CPT

## 2025-08-02 PROCEDURE — 86900 BLOOD TYPING SEROLOGIC ABO: CPT

## 2025-08-02 RX ADMIN — Medication 10 ML: at 09:29

## 2025-08-02 RX ADMIN — GABAPENTIN 300 MG: 300 CAPSULE ORAL at 22:15

## 2025-08-02 RX ADMIN — HYDRALAZINE HYDROCHLORIDE 25 MG: 25 TABLET ORAL at 22:08

## 2025-08-02 RX ADMIN — ATORVASTATIN CALCIUM 10 MG: 10 TABLET ORAL at 09:28

## 2025-08-02 RX ADMIN — ISOSORBIDE DINITRATE 10 MG: 10 TABLET ORAL at 09:28

## 2025-08-02 RX ADMIN — MESALAMINE 0.38 G: 375 CAPSULE, EXTENDED RELEASE ORAL at 12:44

## 2025-08-02 RX ADMIN — CARVEDILOL 12.5 MG: 6.25 TABLET, FILM COATED ORAL at 09:28

## 2025-08-02 RX ADMIN — Medication 10 ML: at 22:09

## 2025-08-02 RX ADMIN — BUDESONIDE 0.5 MG: 0.5 INHALANT RESPIRATORY (INHALATION) at 19:50

## 2025-08-02 RX ADMIN — LEVOTHYROXINE SODIUM 112 MCG: 112 TABLET ORAL at 05:28

## 2025-08-02 RX ADMIN — MESALAMINE 0.38 G: 375 CAPSULE, EXTENDED RELEASE ORAL at 09:28

## 2025-08-02 RX ADMIN — ACETAMINOPHEN 650 MG: 325 TABLET ORAL at 09:35

## 2025-08-02 RX ADMIN — ARFORMOTEROL TARTRATE 15 MCG: 15 SOLUTION RESPIRATORY (INHALATION) at 07:18

## 2025-08-02 RX ADMIN — MONTELUKAST 10 MG: 10 TABLET, FILM COATED ORAL at 22:09

## 2025-08-02 RX ADMIN — ENOXAPARIN SODIUM 30 MG: 100 INJECTION SUBCUTANEOUS at 18:53

## 2025-08-02 RX ADMIN — FUROSEMIDE 40 MG: 40 TABLET ORAL at 09:28

## 2025-08-02 RX ADMIN — CEFEPIME 2000 MG: 2 INJECTION, POWDER, FOR SOLUTION INTRAVENOUS at 22:09

## 2025-08-02 RX ADMIN — BUDESONIDE 0.5 MG: 0.5 INHALANT RESPIRATORY (INHALATION) at 07:18

## 2025-08-02 RX ADMIN — CALCITRIOL 0.5 MCG: 0.25 CAPSULE ORAL at 09:28

## 2025-08-02 RX ADMIN — HYDRALAZINE HYDROCHLORIDE 25 MG: 25 TABLET ORAL at 09:30

## 2025-08-02 RX ADMIN — ARFORMOTEROL TARTRATE 15 MCG: 15 SOLUTION RESPIRATORY (INHALATION) at 19:55

## 2025-08-02 RX ADMIN — MESALAMINE 0.38 G: 375 CAPSULE, EXTENDED RELEASE ORAL at 22:12

## 2025-08-02 RX ADMIN — TAMSULOSIN HYDROCHLORIDE 0.4 MG: 0.4 CAPSULE ORAL at 22:09

## 2025-08-02 RX ADMIN — ASPIRIN 81 MG CHEWABLE TABLET 81 MG: 81 TABLET CHEWABLE at 09:28

## 2025-08-02 RX ADMIN — CLOPIDOGREL BISULFATE 75 MG: 75 TABLET, FILM COATED ORAL at 09:28

## 2025-08-02 RX ADMIN — PANTOPRAZOLE SODIUM 40 MG: 40 TABLET, DELAYED RELEASE ORAL at 05:28

## 2025-08-03 LAB
ABO GROUP BLD: NORMAL
ANION GAP SERPL CALCULATED.3IONS-SCNC: 16 MMOL/L (ref 5–15)
BH BB BLOOD EXPIRATION DATE: NORMAL
BH BB BLOOD TYPE BARCODE: 9500
BH BB DISPENSE STATUS: NORMAL
BH BB PRODUCT CODE: NORMAL
BH BB UNIT NUMBER: NORMAL
BLD GP AB SCN SERPL QL: NEGATIVE
BUN SERPL-MCNC: 39.6 MG/DL (ref 8–23)
BUN/CREAT SERPL: 9.9 (ref 7–25)
CALCIUM SPEC-SCNC: 9 MG/DL (ref 8.6–10.5)
CHLORIDE SERPL-SCNC: 100 MMOL/L (ref 98–107)
CO2 SERPL-SCNC: 22 MMOL/L (ref 22–29)
CREAT SERPL-MCNC: 4 MG/DL (ref 0.76–1.27)
CROSSMATCH INTERPRETATION: NORMAL
DEPRECATED RDW RBC AUTO: 69.7 FL (ref 37–54)
EGFRCR SERPLBLD CKD-EPI 2021: 14.7 ML/MIN/1.73
ERYTHROCYTE [DISTWIDTH] IN BLOOD BY AUTOMATED COUNT: 18.7 % (ref 12.3–15.4)
GLUCOSE SERPL-MCNC: 104 MG/DL (ref 65–99)
HCT VFR BLD AUTO: 22.3 % (ref 37.5–51)
HCT VFR BLD AUTO: 25.5 % (ref 37.5–51)
HGB BLD-MCNC: 6.8 G/DL (ref 13–17.7)
HGB BLD-MCNC: 8.1 G/DL (ref 13–17.7)
MCH RBC QN AUTO: 31.8 PG (ref 26.6–33)
MCHC RBC AUTO-ENTMCNC: 30.5 G/DL (ref 31.5–35.7)
MCV RBC AUTO: 104.2 FL (ref 79–97)
PLATELET # BLD AUTO: 88 10*3/MM3 (ref 140–450)
PMV BLD AUTO: 10.3 FL (ref 6–12)
POTASSIUM SERPL-SCNC: 3.8 MMOL/L (ref 3.5–5.2)
RBC # BLD AUTO: 2.14 10*6/MM3 (ref 4.14–5.8)
RH BLD: NEGATIVE
SODIUM SERPL-SCNC: 138 MMOL/L (ref 136–145)
T&S EXPIRATION DATE: NORMAL
UNIT  ABO: NORMAL
UNIT  RH: NORMAL
WBC NRBC COR # BLD AUTO: 8.12 10*3/MM3 (ref 3.4–10.8)

## 2025-08-03 PROCEDURE — P9016 RBC LEUKOCYTES REDUCED: HCPCS

## 2025-08-03 PROCEDURE — 80048 BASIC METABOLIC PNL TOTAL CA: CPT | Performed by: SURGERY

## 2025-08-03 PROCEDURE — 94664 DEMO&/EVAL PT USE INHALER: CPT

## 2025-08-03 PROCEDURE — 85018 HEMOGLOBIN: CPT | Performed by: STUDENT IN AN ORGANIZED HEALTH CARE EDUCATION/TRAINING PROGRAM

## 2025-08-03 PROCEDURE — P9040 RBC LEUKOREDUCED IRRADIATED: HCPCS

## 2025-08-03 PROCEDURE — 94799 UNLISTED PULMONARY SVC/PX: CPT

## 2025-08-03 PROCEDURE — 85014 HEMATOCRIT: CPT | Performed by: STUDENT IN AN ORGANIZED HEALTH CARE EDUCATION/TRAINING PROGRAM

## 2025-08-03 PROCEDURE — 86900 BLOOD TYPING SEROLOGIC ABO: CPT | Performed by: STUDENT IN AN ORGANIZED HEALTH CARE EDUCATION/TRAINING PROGRAM

## 2025-08-03 PROCEDURE — 94761 N-INVAS EAR/PLS OXIMETRY MLT: CPT

## 2025-08-03 PROCEDURE — 25010000002 ENOXAPARIN PER 10 MG: Performed by: SURGERY

## 2025-08-03 PROCEDURE — 86850 RBC ANTIBODY SCREEN: CPT | Performed by: STUDENT IN AN ORGANIZED HEALTH CARE EDUCATION/TRAINING PROGRAM

## 2025-08-03 PROCEDURE — 36430 TRANSFUSION BLD/BLD COMPNT: CPT

## 2025-08-03 PROCEDURE — 86900 BLOOD TYPING SEROLOGIC ABO: CPT

## 2025-08-03 PROCEDURE — 86923 COMPATIBILITY TEST ELECTRIC: CPT

## 2025-08-03 PROCEDURE — 85027 COMPLETE CBC AUTOMATED: CPT | Performed by: STUDENT IN AN ORGANIZED HEALTH CARE EDUCATION/TRAINING PROGRAM

## 2025-08-03 PROCEDURE — 86901 BLOOD TYPING SEROLOGIC RH(D): CPT | Performed by: STUDENT IN AN ORGANIZED HEALTH CARE EDUCATION/TRAINING PROGRAM

## 2025-08-03 RX ORDER — OXYCODONE AND ACETAMINOPHEN 5; 325 MG/1; MG/1
1 TABLET ORAL EVERY 4 HOURS PRN
Refills: 0 | Status: DISCONTINUED | OUTPATIENT
Start: 2025-08-03 | End: 2025-08-04

## 2025-08-03 RX ADMIN — HYDRALAZINE HYDROCHLORIDE 25 MG: 25 TABLET ORAL at 21:10

## 2025-08-03 RX ADMIN — BUDESONIDE 0.5 MG: 0.5 INHALANT RESPIRATORY (INHALATION) at 18:25

## 2025-08-03 RX ADMIN — MESALAMINE 0.38 G: 375 CAPSULE, EXTENDED RELEASE ORAL at 18:11

## 2025-08-03 RX ADMIN — ASPIRIN 81 MG CHEWABLE TABLET 81 MG: 81 TABLET CHEWABLE at 09:54

## 2025-08-03 RX ADMIN — Medication 10 ML: at 21:10

## 2025-08-03 RX ADMIN — GABAPENTIN 300 MG: 300 CAPSULE ORAL at 21:10

## 2025-08-03 RX ADMIN — Medication 10 ML: at 09:55

## 2025-08-03 RX ADMIN — MESALAMINE 0.38 G: 375 CAPSULE, EXTENDED RELEASE ORAL at 09:54

## 2025-08-03 RX ADMIN — ISOSORBIDE DINITRATE 10 MG: 10 TABLET ORAL at 09:54

## 2025-08-03 RX ADMIN — CLOPIDOGREL BISULFATE 75 MG: 75 TABLET, FILM COATED ORAL at 09:54

## 2025-08-03 RX ADMIN — CARVEDILOL 12.5 MG: 6.25 TABLET, FILM COATED ORAL at 09:54

## 2025-08-03 RX ADMIN — ENOXAPARIN SODIUM 30 MG: 100 INJECTION SUBCUTANEOUS at 18:11

## 2025-08-03 RX ADMIN — ATORVASTATIN CALCIUM 10 MG: 10 TABLET ORAL at 09:54

## 2025-08-03 RX ADMIN — TAMSULOSIN HYDROCHLORIDE 0.4 MG: 0.4 CAPSULE ORAL at 21:10

## 2025-08-03 RX ADMIN — CALCITRIOL 0.5 MCG: 0.25 CAPSULE ORAL at 09:54

## 2025-08-03 RX ADMIN — LEVOTHYROXINE SODIUM 112 MCG: 112 TABLET ORAL at 06:16

## 2025-08-03 RX ADMIN — PANTOPRAZOLE SODIUM 40 MG: 40 TABLET, DELAYED RELEASE ORAL at 06:16

## 2025-08-03 RX ADMIN — BUDESONIDE 0.5 MG: 0.5 INHALANT RESPIRATORY (INHALATION) at 06:29

## 2025-08-03 RX ADMIN — ARFORMOTEROL TARTRATE 15 MCG: 15 SOLUTION RESPIRATORY (INHALATION) at 18:24

## 2025-08-03 RX ADMIN — OXYCODONE HYDROCHLORIDE AND ACETAMINOPHEN 1 TABLET: 5; 325 TABLET ORAL at 18:11

## 2025-08-03 RX ADMIN — MESALAMINE 0.38 G: 375 CAPSULE, EXTENDED RELEASE ORAL at 21:10

## 2025-08-03 RX ADMIN — ARFORMOTEROL TARTRATE 15 MCG: 15 SOLUTION RESPIRATORY (INHALATION) at 06:34

## 2025-08-03 RX ADMIN — FUROSEMIDE 40 MG: 40 TABLET ORAL at 09:54

## 2025-08-03 RX ADMIN — MONTELUKAST 10 MG: 10 TABLET, FILM COATED ORAL at 21:10

## 2025-08-03 RX ADMIN — HYDRALAZINE HYDROCHLORIDE 25 MG: 25 TABLET ORAL at 09:54

## 2025-08-04 LAB
ANION GAP SERPL CALCULATED.3IONS-SCNC: 16 MMOL/L (ref 5–15)
BH BB BLOOD EXPIRATION DATE: NORMAL
BH BB BLOOD TYPE BARCODE: 9500
BH BB DISPENSE STATUS: NORMAL
BH BB PRODUCT CODE: NORMAL
BH BB UNIT NUMBER: NORMAL
BUN SERPL-MCNC: 57.6 MG/DL (ref 8–23)
BUN/CREAT SERPL: 12.2 (ref 7–25)
C AURIS DNA SPEC QL NAA+NON-PROBE: NOT DETECTED
CALCIUM SPEC-SCNC: 9.1 MG/DL (ref 8.6–10.5)
CHLORIDE SERPL-SCNC: 99 MMOL/L (ref 98–107)
CO2 SERPL-SCNC: 21 MMOL/L (ref 22–29)
CREAT SERPL-MCNC: 4.74 MG/DL (ref 0.76–1.27)
CROSSMATCH INTERPRETATION: NORMAL
CYTO UR: NORMAL
DEPRECATED RDW RBC AUTO: 67.6 FL (ref 37–54)
EGFRCR SERPLBLD CKD-EPI 2021: 12 ML/MIN/1.73
ERYTHROCYTE [DISTWIDTH] IN BLOOD BY AUTOMATED COUNT: 19.5 % (ref 12.3–15.4)
GLUCOSE SERPL-MCNC: 101 MG/DL (ref 65–99)
HCT VFR BLD AUTO: 26.6 % (ref 37.5–51)
HGB BLD-MCNC: 8.6 G/DL (ref 13–17.7)
LAB AP CASE REPORT: NORMAL
Lab: NORMAL
MCH RBC QN AUTO: 31.5 PG (ref 26.6–33)
MCHC RBC AUTO-ENTMCNC: 32.3 G/DL (ref 31.5–35.7)
MCV RBC AUTO: 97.4 FL (ref 79–97)
PATH REPORT.FINAL DX SPEC: NORMAL
PATH REPORT.GROSS SPEC: NORMAL
PLATELET # BLD AUTO: 100 10*3/MM3 (ref 140–450)
PMV BLD AUTO: 10.6 FL (ref 6–12)
POTASSIUM SERPL-SCNC: 4.5 MMOL/L (ref 3.5–5.2)
RBC # BLD AUTO: 2.73 10*6/MM3 (ref 4.14–5.8)
SODIUM SERPL-SCNC: 136 MMOL/L (ref 136–145)
UNIT  ABO: NORMAL
UNIT  RH: NORMAL
WBC NRBC COR # BLD AUTO: 8.49 10*3/MM3 (ref 3.4–10.8)

## 2025-08-04 PROCEDURE — 94799 UNLISTED PULMONARY SVC/PX: CPT

## 2025-08-04 PROCEDURE — 80048 BASIC METABOLIC PNL TOTAL CA: CPT | Performed by: SURGERY

## 2025-08-04 PROCEDURE — 99024 POSTOP FOLLOW-UP VISIT: CPT | Performed by: NURSE PRACTITIONER

## 2025-08-04 PROCEDURE — 92610 EVALUATE SWALLOWING FUNCTION: CPT

## 2025-08-04 PROCEDURE — 94761 N-INVAS EAR/PLS OXIMETRY MLT: CPT

## 2025-08-04 PROCEDURE — 94664 DEMO&/EVAL PT USE INHALER: CPT

## 2025-08-04 PROCEDURE — 85027 COMPLETE CBC AUTOMATED: CPT | Performed by: STUDENT IN AN ORGANIZED HEALTH CARE EDUCATION/TRAINING PROGRAM

## 2025-08-04 PROCEDURE — 25010000002 EPOETIN ALFA-EPBX 3000 UNIT/ML SOLUTION: Performed by: STUDENT IN AN ORGANIZED HEALTH CARE EDUCATION/TRAINING PROGRAM

## 2025-08-04 PROCEDURE — 25010000002 HEPARIN (PORCINE) PER 1000 UNITS: Performed by: SURGERY

## 2025-08-04 PROCEDURE — 25010000002 ENOXAPARIN PER 10 MG: Performed by: SURGERY

## 2025-08-04 RX ORDER — OXYCODONE AND ACETAMINOPHEN 5; 325 MG/1; MG/1
1 TABLET ORAL ONCE
Refills: 0 | Status: COMPLETED | OUTPATIENT
Start: 2025-08-04 | End: 2025-08-04

## 2025-08-04 RX ORDER — OXYCODONE AND ACETAMINOPHEN 7.5; 325 MG/1; MG/1
1 TABLET ORAL EVERY 4 HOURS PRN
Refills: 0 | Status: DISCONTINUED | OUTPATIENT
Start: 2025-08-04 | End: 2025-08-09 | Stop reason: HOSPADM

## 2025-08-04 RX ADMIN — MESALAMINE 0.38 G: 375 CAPSULE, EXTENDED RELEASE ORAL at 14:06

## 2025-08-04 RX ADMIN — BISACODYL 10 MG: 10 SUPPOSITORY RECTAL at 04:58

## 2025-08-04 RX ADMIN — ENOXAPARIN SODIUM 30 MG: 100 INJECTION SUBCUTANEOUS at 17:51

## 2025-08-04 RX ADMIN — HYDRALAZINE HYDROCHLORIDE 25 MG: 25 TABLET ORAL at 14:06

## 2025-08-04 RX ADMIN — OXYCODONE HYDROCHLORIDE AND ACETAMINOPHEN 1 TABLET: 5; 325 TABLET ORAL at 14:27

## 2025-08-04 RX ADMIN — CALCITRIOL 0.5 MCG: 0.25 CAPSULE ORAL at 14:06

## 2025-08-04 RX ADMIN — MESALAMINE 0.38 G: 375 CAPSULE, EXTENDED RELEASE ORAL at 20:39

## 2025-08-04 RX ADMIN — GABAPENTIN 300 MG: 300 CAPSULE ORAL at 20:39

## 2025-08-04 RX ADMIN — OXYCODONE HYDROCHLORIDE AND ACETAMINOPHEN 1 TABLET: 5; 325 TABLET ORAL at 17:51

## 2025-08-04 RX ADMIN — TAMSULOSIN HYDROCHLORIDE 0.4 MG: 0.4 CAPSULE ORAL at 20:39

## 2025-08-04 RX ADMIN — Medication 10 ML: at 14:07

## 2025-08-04 RX ADMIN — MESALAMINE 0.38 G: 375 CAPSULE, EXTENDED RELEASE ORAL at 17:51

## 2025-08-04 RX ADMIN — ATORVASTATIN CALCIUM 10 MG: 10 TABLET ORAL at 14:06

## 2025-08-04 RX ADMIN — CLOPIDOGREL BISULFATE 75 MG: 75 TABLET, FILM COATED ORAL at 14:06

## 2025-08-04 RX ADMIN — ARFORMOTEROL TARTRATE 15 MCG: 15 SOLUTION RESPIRATORY (INHALATION) at 06:18

## 2025-08-04 RX ADMIN — BUDESONIDE 0.5 MG: 0.5 INHALANT RESPIRATORY (INHALATION) at 19:02

## 2025-08-04 RX ADMIN — HEPARIN SODIUM 3200 UNITS: 1000 INJECTION, SOLUTION INTRAVENOUS; SUBCUTANEOUS at 13:03

## 2025-08-04 RX ADMIN — BUDESONIDE 0.5 MG: 0.5 INHALANT RESPIRATORY (INHALATION) at 06:12

## 2025-08-04 RX ADMIN — ISOSORBIDE DINITRATE 10 MG: 10 TABLET ORAL at 17:51

## 2025-08-04 RX ADMIN — MONTELUKAST 10 MG: 10 TABLET, FILM COATED ORAL at 20:39

## 2025-08-04 RX ADMIN — Medication 10 ML: at 20:39

## 2025-08-04 RX ADMIN — ARFORMOTEROL TARTRATE 15 MCG: 15 SOLUTION RESPIRATORY (INHALATION) at 19:02

## 2025-08-04 RX ADMIN — CARVEDILOL 12.5 MG: 6.25 TABLET, FILM COATED ORAL at 17:51

## 2025-08-04 RX ADMIN — ISOSORBIDE DINITRATE 10 MG: 10 TABLET ORAL at 14:06

## 2025-08-04 RX ADMIN — HYDRALAZINE HYDROCHLORIDE 25 MG: 25 TABLET ORAL at 20:39

## 2025-08-04 RX ADMIN — EPOETIN ALFA-EPBX 3000 UNITS: 3000 INJECTION, SOLUTION INTRAVENOUS; SUBCUTANEOUS at 14:06

## 2025-08-05 LAB
ANION GAP SERPL CALCULATED.3IONS-SCNC: 12 MMOL/L (ref 5–15)
BUN SERPL-MCNC: 23.1 MG/DL (ref 8–23)
BUN/CREAT SERPL: 9.2 (ref 7–25)
CALCIUM SPEC-SCNC: 8.6 MG/DL (ref 8.6–10.5)
CHLORIDE SERPL-SCNC: 98 MMOL/L (ref 98–107)
CO2 SERPL-SCNC: 26 MMOL/L (ref 22–29)
CREAT SERPL-MCNC: 2.51 MG/DL (ref 0.76–1.27)
DEPRECATED RDW RBC AUTO: 66.4 FL (ref 37–54)
EGFRCR SERPLBLD CKD-EPI 2021: 25.7 ML/MIN/1.73
ERYTHROCYTE [DISTWIDTH] IN BLOOD BY AUTOMATED COUNT: 18.6 % (ref 12.3–15.4)
GLUCOSE BLDC GLUCOMTR-MCNC: 93 MG/DL (ref 70–130)
GLUCOSE SERPL-MCNC: 95 MG/DL (ref 65–99)
HCT VFR BLD AUTO: 25.6 % (ref 37.5–51)
HGB BLD-MCNC: 8.1 G/DL (ref 13–17.7)
MCH RBC QN AUTO: 31.4 PG (ref 26.6–33)
MCHC RBC AUTO-ENTMCNC: 31.6 G/DL (ref 31.5–35.7)
MCV RBC AUTO: 99.2 FL (ref 79–97)
PLATELET # BLD AUTO: 102 10*3/MM3 (ref 140–450)
PMV BLD AUTO: 10.9 FL (ref 6–12)
POTASSIUM SERPL-SCNC: 3.5 MMOL/L (ref 3.5–5.2)
RBC # BLD AUTO: 2.58 10*6/MM3 (ref 4.14–5.8)
SODIUM SERPL-SCNC: 136 MMOL/L (ref 136–145)
WBC NRBC COR # BLD AUTO: 5.72 10*3/MM3 (ref 3.4–10.8)

## 2025-08-05 PROCEDURE — 94799 UNLISTED PULMONARY SVC/PX: CPT

## 2025-08-05 PROCEDURE — 94664 DEMO&/EVAL PT USE INHALER: CPT

## 2025-08-05 PROCEDURE — 80048 BASIC METABOLIC PNL TOTAL CA: CPT | Performed by: SURGERY

## 2025-08-05 PROCEDURE — 85027 COMPLETE CBC AUTOMATED: CPT | Performed by: STUDENT IN AN ORGANIZED HEALTH CARE EDUCATION/TRAINING PROGRAM

## 2025-08-05 PROCEDURE — 97162 PT EVAL MOD COMPLEX 30 MIN: CPT | Performed by: PHYSICAL THERAPIST

## 2025-08-05 PROCEDURE — 94761 N-INVAS EAR/PLS OXIMETRY MLT: CPT

## 2025-08-05 PROCEDURE — 92526 ORAL FUNCTION THERAPY: CPT

## 2025-08-05 PROCEDURE — 82948 REAGENT STRIP/BLOOD GLUCOSE: CPT

## 2025-08-05 PROCEDURE — 99024 POSTOP FOLLOW-UP VISIT: CPT | Performed by: SURGERY

## 2025-08-05 PROCEDURE — 25010000002 ENOXAPARIN PER 10 MG: Performed by: SURGERY

## 2025-08-05 RX ORDER — ACETAMINOPHEN 325 MG/1
650 TABLET ORAL EVERY 6 HOURS PRN
Status: DISCONTINUED | OUTPATIENT
Start: 2025-08-05 | End: 2025-08-09 | Stop reason: HOSPADM

## 2025-08-05 RX ADMIN — MESALAMINE 0.38 G: 375 CAPSULE, EXTENDED RELEASE ORAL at 11:23

## 2025-08-05 RX ADMIN — CARVEDILOL 12.5 MG: 6.25 TABLET, FILM COATED ORAL at 09:37

## 2025-08-05 RX ADMIN — HYDRALAZINE HYDROCHLORIDE 25 MG: 25 TABLET ORAL at 09:37

## 2025-08-05 RX ADMIN — GABAPENTIN 300 MG: 300 CAPSULE ORAL at 20:15

## 2025-08-05 RX ADMIN — Medication 10 ML: at 20:15

## 2025-08-05 RX ADMIN — BUDESONIDE 0.5 MG: 0.5 INHALANT RESPIRATORY (INHALATION) at 19:23

## 2025-08-05 RX ADMIN — ISOSORBIDE DINITRATE 10 MG: 10 TABLET ORAL at 14:12

## 2025-08-05 RX ADMIN — MESALAMINE 0.38 G: 375 CAPSULE, EXTENDED RELEASE ORAL at 20:15

## 2025-08-05 RX ADMIN — ENOXAPARIN SODIUM 30 MG: 100 INJECTION SUBCUTANEOUS at 17:57

## 2025-08-05 RX ADMIN — HYDRALAZINE HYDROCHLORIDE 25 MG: 25 TABLET ORAL at 20:15

## 2025-08-05 RX ADMIN — ACETAMINOPHEN 650 MG: 325 TABLET ORAL at 11:23

## 2025-08-05 RX ADMIN — CLOPIDOGREL BISULFATE 75 MG: 75 TABLET, FILM COATED ORAL at 09:38

## 2025-08-05 RX ADMIN — MESALAMINE 0.38 G: 375 CAPSULE, EXTENDED RELEASE ORAL at 17:16

## 2025-08-05 RX ADMIN — ASPIRIN 81 MG CHEWABLE TABLET 81 MG: 81 TABLET CHEWABLE at 09:38

## 2025-08-05 RX ADMIN — CARVEDILOL 12.5 MG: 6.25 TABLET, FILM COATED ORAL at 17:14

## 2025-08-05 RX ADMIN — Medication 10 ML: at 09:39

## 2025-08-05 RX ADMIN — ARFORMOTEROL TARTRATE 15 MCG: 15 SOLUTION RESPIRATORY (INHALATION) at 19:23

## 2025-08-05 RX ADMIN — BUDESONIDE 0.5 MG: 0.5 INHALANT RESPIRATORY (INHALATION) at 07:04

## 2025-08-05 RX ADMIN — ARFORMOTEROL TARTRATE 15 MCG: 15 SOLUTION RESPIRATORY (INHALATION) at 07:09

## 2025-08-05 RX ADMIN — MESALAMINE 0.38 G: 375 CAPSULE, EXTENDED RELEASE ORAL at 09:36

## 2025-08-05 RX ADMIN — CALCITRIOL 0.5 MCG: 0.25 CAPSULE ORAL at 09:38

## 2025-08-05 RX ADMIN — MONTELUKAST 10 MG: 10 TABLET, FILM COATED ORAL at 20:15

## 2025-08-05 RX ADMIN — TAMSULOSIN HYDROCHLORIDE 0.4 MG: 0.4 CAPSULE ORAL at 20:15

## 2025-08-05 RX ADMIN — ISOSORBIDE DINITRATE 10 MG: 10 TABLET ORAL at 09:38

## 2025-08-05 RX ADMIN — ACETAMINOPHEN 650 MG: 325 TABLET ORAL at 17:15

## 2025-08-05 RX ADMIN — ISOSORBIDE DINITRATE 10 MG: 10 TABLET ORAL at 17:16

## 2025-08-05 RX ADMIN — ATORVASTATIN CALCIUM 10 MG: 10 TABLET ORAL at 09:38

## 2025-08-06 LAB
DEPRECATED RDW RBC AUTO: 61.4 FL (ref 37–54)
ERYTHROCYTE [DISTWIDTH] IN BLOOD BY AUTOMATED COUNT: 17.5 % (ref 12.3–15.4)
HBV SURFACE AG SERPL QL IA: NORMAL
HCT VFR BLD AUTO: 26.3 % (ref 37.5–51)
HGB BLD-MCNC: 8.4 G/DL (ref 13–17.7)
MCH RBC QN AUTO: 32.1 PG (ref 26.6–33)
MCHC RBC AUTO-ENTMCNC: 31.9 G/DL (ref 31.5–35.7)
MCV RBC AUTO: 100.4 FL (ref 79–97)
PLATELET # BLD AUTO: 105 10*3/MM3 (ref 140–450)
PMV BLD AUTO: 10.3 FL (ref 6–12)
RBC # BLD AUTO: 2.62 10*6/MM3 (ref 4.14–5.8)
WBC NRBC COR # BLD AUTO: 6.18 10*3/MM3 (ref 3.4–10.8)

## 2025-08-06 PROCEDURE — 92526 ORAL FUNCTION THERAPY: CPT

## 2025-08-06 PROCEDURE — 87340 HEPATITIS B SURFACE AG IA: CPT | Performed by: INTERNAL MEDICINE

## 2025-08-06 PROCEDURE — 25010000002 EPOETIN ALFA-EPBX 3000 UNIT/ML SOLUTION: Performed by: STUDENT IN AN ORGANIZED HEALTH CARE EDUCATION/TRAINING PROGRAM

## 2025-08-06 PROCEDURE — 25010000002 ENOXAPARIN PER 10 MG: Performed by: SURGERY

## 2025-08-06 PROCEDURE — 25010000002 HEPARIN (PORCINE) PER 1000 UNITS: Performed by: SURGERY

## 2025-08-06 PROCEDURE — 94664 DEMO&/EVAL PT USE INHALER: CPT

## 2025-08-06 PROCEDURE — 97550 CAREGIVER TRAING 1ST 30 MIN: CPT

## 2025-08-06 PROCEDURE — 94799 UNLISTED PULMONARY SVC/PX: CPT

## 2025-08-06 PROCEDURE — 94761 N-INVAS EAR/PLS OXIMETRY MLT: CPT

## 2025-08-06 PROCEDURE — 99024 POSTOP FOLLOW-UP VISIT: CPT | Performed by: NURSE PRACTITIONER

## 2025-08-06 PROCEDURE — 94760 N-INVAS EAR/PLS OXIMETRY 1: CPT

## 2025-08-06 PROCEDURE — 97167 OT EVAL HIGH COMPLEX 60 MIN: CPT

## 2025-08-06 PROCEDURE — 85027 COMPLETE CBC AUTOMATED: CPT | Performed by: STUDENT IN AN ORGANIZED HEALTH CARE EDUCATION/TRAINING PROGRAM

## 2025-08-06 RX ADMIN — LEVOTHYROXINE SODIUM 112 MCG: 112 TABLET ORAL at 05:29

## 2025-08-06 RX ADMIN — PANTOPRAZOLE SODIUM 40 MG: 40 TABLET, DELAYED RELEASE ORAL at 05:29

## 2025-08-06 RX ADMIN — ISOSORBIDE DINITRATE 10 MG: 10 TABLET ORAL at 17:45

## 2025-08-06 RX ADMIN — MESALAMINE 0.38 G: 375 CAPSULE, EXTENDED RELEASE ORAL at 17:45

## 2025-08-06 RX ADMIN — BUDESONIDE 0.5 MG: 0.5 INHALANT RESPIRATORY (INHALATION) at 19:11

## 2025-08-06 RX ADMIN — EPOETIN ALFA-EPBX 3000 UNITS: 3000 INJECTION, SOLUTION INTRAVENOUS; SUBCUTANEOUS at 15:02

## 2025-08-06 RX ADMIN — ARFORMOTEROL TARTRATE 15 MCG: 15 SOLUTION RESPIRATORY (INHALATION) at 19:11

## 2025-08-06 RX ADMIN — MONTELUKAST 10 MG: 10 TABLET, FILM COATED ORAL at 20:58

## 2025-08-06 RX ADMIN — HEPARIN SODIUM 3200 UNITS: 1000 INJECTION, SOLUTION INTRAVENOUS; SUBCUTANEOUS at 13:30

## 2025-08-06 RX ADMIN — ATORVASTATIN CALCIUM 10 MG: 10 TABLET ORAL at 08:55

## 2025-08-06 RX ADMIN — TAMSULOSIN HYDROCHLORIDE 0.4 MG: 0.4 CAPSULE ORAL at 20:58

## 2025-08-06 RX ADMIN — ASPIRIN 81 MG CHEWABLE TABLET 81 MG: 81 TABLET CHEWABLE at 08:55

## 2025-08-06 RX ADMIN — BUDESONIDE 0.5 MG: 0.5 INHALANT RESPIRATORY (INHALATION) at 06:16

## 2025-08-06 RX ADMIN — ISOSORBIDE DINITRATE 10 MG: 10 TABLET ORAL at 14:49

## 2025-08-06 RX ADMIN — MESALAMINE 0.38 G: 375 CAPSULE, EXTENDED RELEASE ORAL at 20:58

## 2025-08-06 RX ADMIN — CARVEDILOL 12.5 MG: 6.25 TABLET, FILM COATED ORAL at 17:45

## 2025-08-06 RX ADMIN — ACETAMINOPHEN 650 MG: 325 TABLET ORAL at 08:44

## 2025-08-06 RX ADMIN — GABAPENTIN 300 MG: 300 CAPSULE ORAL at 20:58

## 2025-08-06 RX ADMIN — ENOXAPARIN SODIUM 30 MG: 100 INJECTION SUBCUTANEOUS at 17:45

## 2025-08-06 RX ADMIN — HYDRALAZINE HYDROCHLORIDE 25 MG: 25 TABLET ORAL at 14:49

## 2025-08-06 RX ADMIN — ACETAMINOPHEN 650 MG: 325 TABLET ORAL at 14:49

## 2025-08-06 RX ADMIN — ARFORMOTEROL TARTRATE 15 MCG: 15 SOLUTION RESPIRATORY (INHALATION) at 06:20

## 2025-08-06 RX ADMIN — Medication 10 ML: at 14:52

## 2025-08-07 LAB
ANION GAP SERPL CALCULATED.3IONS-SCNC: 10 MMOL/L (ref 5–15)
BUN SERPL-MCNC: 25.2 MG/DL (ref 8–23)
BUN/CREAT SERPL: 8.9 (ref 7–25)
CALCIUM SPEC-SCNC: 8.8 MG/DL (ref 8.6–10.5)
CHLORIDE SERPL-SCNC: 100 MMOL/L (ref 98–107)
CO2 SERPL-SCNC: 24 MMOL/L (ref 22–29)
CREAT SERPL-MCNC: 2.82 MG/DL (ref 0.76–1.27)
DEPRECATED RDW RBC AUTO: 63.6 FL (ref 37–54)
EGFRCR SERPLBLD CKD-EPI 2021: 22.3 ML/MIN/1.73
ERYTHROCYTE [DISTWIDTH] IN BLOOD BY AUTOMATED COUNT: 17.4 % (ref 12.3–15.4)
GLUCOSE SERPL-MCNC: 107 MG/DL (ref 65–99)
HCT VFR BLD AUTO: 26.6 % (ref 37.5–51)
HGB BLD-MCNC: 8.3 G/DL (ref 13–17.7)
MCH RBC QN AUTO: 31.9 PG (ref 26.6–33)
MCHC RBC AUTO-ENTMCNC: 31.2 G/DL (ref 31.5–35.7)
MCV RBC AUTO: 102.3 FL (ref 79–97)
PLATELET # BLD AUTO: 126 10*3/MM3 (ref 140–450)
PMV BLD AUTO: 10.7 FL (ref 6–12)
POTASSIUM SERPL-SCNC: 4.3 MMOL/L (ref 3.5–5.2)
RBC # BLD AUTO: 2.6 10*6/MM3 (ref 4.14–5.8)
SODIUM SERPL-SCNC: 134 MMOL/L (ref 136–145)
WBC NRBC COR # BLD AUTO: 5.97 10*3/MM3 (ref 3.4–10.8)

## 2025-08-07 PROCEDURE — 25010000002 ENOXAPARIN PER 10 MG: Performed by: SURGERY

## 2025-08-07 PROCEDURE — 94664 DEMO&/EVAL PT USE INHALER: CPT

## 2025-08-07 PROCEDURE — 85027 COMPLETE CBC AUTOMATED: CPT | Performed by: STUDENT IN AN ORGANIZED HEALTH CARE EDUCATION/TRAINING PROGRAM

## 2025-08-07 PROCEDURE — 80048 BASIC METABOLIC PNL TOTAL CA: CPT | Performed by: STUDENT IN AN ORGANIZED HEALTH CARE EDUCATION/TRAINING PROGRAM

## 2025-08-07 PROCEDURE — 94760 N-INVAS EAR/PLS OXIMETRY 1: CPT

## 2025-08-07 PROCEDURE — 97110 THERAPEUTIC EXERCISES: CPT

## 2025-08-07 PROCEDURE — 97530 THERAPEUTIC ACTIVITIES: CPT

## 2025-08-07 PROCEDURE — 92526 ORAL FUNCTION THERAPY: CPT | Performed by: SPEECH-LANGUAGE PATHOLOGIST

## 2025-08-07 PROCEDURE — 94799 UNLISTED PULMONARY SVC/PX: CPT

## 2025-08-07 RX ADMIN — CARVEDILOL 12.5 MG: 6.25 TABLET, FILM COATED ORAL at 17:41

## 2025-08-07 RX ADMIN — ISOSORBIDE DINITRATE 10 MG: 10 TABLET ORAL at 13:12

## 2025-08-07 RX ADMIN — BUDESONIDE 0.5 MG: 0.5 INHALANT RESPIRATORY (INHALATION) at 07:17

## 2025-08-07 RX ADMIN — ARFORMOTEROL TARTRATE 15 MCG: 15 SOLUTION RESPIRATORY (INHALATION) at 07:22

## 2025-08-07 RX ADMIN — LEVOTHYROXINE SODIUM 112 MCG: 112 TABLET ORAL at 07:46

## 2025-08-07 RX ADMIN — CARVEDILOL 12.5 MG: 6.25 TABLET, FILM COATED ORAL at 08:58

## 2025-08-07 RX ADMIN — MESALAMINE 0.38 G: 375 CAPSULE, EXTENDED RELEASE ORAL at 20:51

## 2025-08-07 RX ADMIN — ATORVASTATIN CALCIUM 10 MG: 10 TABLET ORAL at 08:58

## 2025-08-07 RX ADMIN — MONTELUKAST 10 MG: 10 TABLET, FILM COATED ORAL at 20:51

## 2025-08-07 RX ADMIN — HYDRALAZINE HYDROCHLORIDE 25 MG: 25 TABLET ORAL at 20:51

## 2025-08-07 RX ADMIN — ISOSORBIDE DINITRATE 10 MG: 10 TABLET ORAL at 17:42

## 2025-08-07 RX ADMIN — ENOXAPARIN SODIUM 30 MG: 100 INJECTION SUBCUTANEOUS at 17:41

## 2025-08-07 RX ADMIN — CALCITRIOL 0.5 MCG: 0.25 CAPSULE ORAL at 08:58

## 2025-08-07 RX ADMIN — HYDRALAZINE HYDROCHLORIDE 25 MG: 25 TABLET ORAL at 08:58

## 2025-08-07 RX ADMIN — PANTOPRAZOLE SODIUM 40 MG: 40 TABLET, DELAYED RELEASE ORAL at 07:46

## 2025-08-07 RX ADMIN — GABAPENTIN 300 MG: 300 CAPSULE ORAL at 20:51

## 2025-08-07 RX ADMIN — HYDRALAZINE HYDROCHLORIDE 25 MG: 25 TABLET ORAL at 15:30

## 2025-08-07 RX ADMIN — ASPIRIN 81 MG CHEWABLE TABLET 81 MG: 81 TABLET CHEWABLE at 08:58

## 2025-08-07 RX ADMIN — ISOSORBIDE DINITRATE 10 MG: 10 TABLET ORAL at 08:58

## 2025-08-07 RX ADMIN — MESALAMINE 0.38 G: 375 CAPSULE, EXTENDED RELEASE ORAL at 13:12

## 2025-08-07 RX ADMIN — TAMSULOSIN HYDROCHLORIDE 0.4 MG: 0.4 CAPSULE ORAL at 20:51

## 2025-08-07 RX ADMIN — CLOPIDOGREL BISULFATE 75 MG: 75 TABLET, FILM COATED ORAL at 08:58

## 2025-08-07 RX ADMIN — MESALAMINE 0.38 G: 375 CAPSULE, EXTENDED RELEASE ORAL at 17:41

## 2025-08-07 RX ADMIN — MESALAMINE 0.38 G: 375 CAPSULE, EXTENDED RELEASE ORAL at 08:58

## 2025-08-07 RX ADMIN — BUDESONIDE 0.5 MG: 0.5 INHALANT RESPIRATORY (INHALATION) at 20:59

## 2025-08-07 RX ADMIN — ARFORMOTEROL TARTRATE 15 MCG: 15 SOLUTION RESPIRATORY (INHALATION) at 20:59

## 2025-08-07 RX ADMIN — ACETAMINOPHEN 650 MG: 325 TABLET ORAL at 08:58

## 2025-08-08 LAB
ANION GAP SERPL CALCULATED.3IONS-SCNC: 11 MMOL/L (ref 5–15)
BUN SERPL-MCNC: 43.2 MG/DL (ref 8–23)
BUN/CREAT SERPL: 10.4 (ref 7–25)
CALCIUM SPEC-SCNC: 9.4 MG/DL (ref 8.6–10.5)
CHLORIDE SERPL-SCNC: 96 MMOL/L (ref 98–107)
CO2 SERPL-SCNC: 23 MMOL/L (ref 22–29)
CREAT SERPL-MCNC: 4.15 MG/DL (ref 0.76–1.27)
DEPRECATED RDW RBC AUTO: 63.5 FL (ref 37–54)
EGFRCR SERPLBLD CKD-EPI 2021: 14.1 ML/MIN/1.73
ERYTHROCYTE [DISTWIDTH] IN BLOOD BY AUTOMATED COUNT: 17.2 % (ref 12.3–15.4)
GLUCOSE SERPL-MCNC: 97 MG/DL (ref 65–99)
HCT VFR BLD AUTO: 27.7 % (ref 37.5–51)
HGB BLD-MCNC: 8.6 G/DL (ref 13–17.7)
MCH RBC QN AUTO: 31.7 PG (ref 26.6–33)
MCHC RBC AUTO-ENTMCNC: 31 G/DL (ref 31.5–35.7)
MCV RBC AUTO: 102.2 FL (ref 79–97)
PLATELET # BLD AUTO: 138 10*3/MM3 (ref 140–450)
PMV BLD AUTO: 10.8 FL (ref 6–12)
POTASSIUM SERPL-SCNC: 5.1 MMOL/L (ref 3.5–5.2)
RBC # BLD AUTO: 2.71 10*6/MM3 (ref 4.14–5.8)
SODIUM SERPL-SCNC: 130 MMOL/L (ref 136–145)
WBC NRBC COR # BLD AUTO: 5.9 10*3/MM3 (ref 3.4–10.8)

## 2025-08-08 PROCEDURE — 97530 THERAPEUTIC ACTIVITIES: CPT

## 2025-08-08 PROCEDURE — 94799 UNLISTED PULMONARY SVC/PX: CPT

## 2025-08-08 PROCEDURE — 25010000002 ENOXAPARIN PER 10 MG: Performed by: SURGERY

## 2025-08-08 PROCEDURE — 85027 COMPLETE CBC AUTOMATED: CPT | Performed by: STUDENT IN AN ORGANIZED HEALTH CARE EDUCATION/TRAINING PROGRAM

## 2025-08-08 PROCEDURE — 97110 THERAPEUTIC EXERCISES: CPT

## 2025-08-08 PROCEDURE — 80048 BASIC METABOLIC PNL TOTAL CA: CPT | Performed by: STUDENT IN AN ORGANIZED HEALTH CARE EDUCATION/TRAINING PROGRAM

## 2025-08-08 PROCEDURE — 25010000002 HEPARIN (PORCINE) PER 1000 UNITS: Performed by: SURGERY

## 2025-08-08 PROCEDURE — 25010000002 EPOETIN ALFA-EPBX 3000 UNIT/ML SOLUTION: Performed by: STUDENT IN AN ORGANIZED HEALTH CARE EDUCATION/TRAINING PROGRAM

## 2025-08-08 RX ADMIN — CALCITRIOL 0.5 MCG: 0.25 CAPSULE ORAL at 13:47

## 2025-08-08 RX ADMIN — MONTELUKAST 10 MG: 10 TABLET, FILM COATED ORAL at 21:11

## 2025-08-08 RX ADMIN — CARVEDILOL 12.5 MG: 6.25 TABLET, FILM COATED ORAL at 18:22

## 2025-08-08 RX ADMIN — ISOSORBIDE DINITRATE 10 MG: 10 TABLET ORAL at 18:22

## 2025-08-08 RX ADMIN — HYDRALAZINE HYDROCHLORIDE 25 MG: 25 TABLET ORAL at 13:47

## 2025-08-08 RX ADMIN — ARFORMOTEROL TARTRATE 15 MCG: 15 SOLUTION RESPIRATORY (INHALATION) at 20:09

## 2025-08-08 RX ADMIN — HYDRALAZINE HYDROCHLORIDE 25 MG: 25 TABLET ORAL at 21:11

## 2025-08-08 RX ADMIN — CARVEDILOL 12.5 MG: 6.25 TABLET, FILM COATED ORAL at 13:47

## 2025-08-08 RX ADMIN — MESALAMINE 0.38 G: 375 CAPSULE, EXTENDED RELEASE ORAL at 13:47

## 2025-08-08 RX ADMIN — LEVOTHYROXINE SODIUM 112 MCG: 112 TABLET ORAL at 06:14

## 2025-08-08 RX ADMIN — MESALAMINE 0.38 G: 375 CAPSULE, EXTENDED RELEASE ORAL at 18:22

## 2025-08-08 RX ADMIN — ENOXAPARIN SODIUM 30 MG: 100 INJECTION SUBCUTANEOUS at 18:22

## 2025-08-08 RX ADMIN — PANTOPRAZOLE SODIUM 40 MG: 40 TABLET, DELAYED RELEASE ORAL at 06:14

## 2025-08-08 RX ADMIN — ACETAMINOPHEN 650 MG: 325 TABLET ORAL at 18:26

## 2025-08-08 RX ADMIN — EPOETIN ALFA-EPBX 3000 UNITS: 3000 INJECTION, SOLUTION INTRAVENOUS; SUBCUTANEOUS at 13:48

## 2025-08-08 RX ADMIN — ATORVASTATIN CALCIUM 10 MG: 10 TABLET ORAL at 13:47

## 2025-08-08 RX ADMIN — ISOSORBIDE DINITRATE 10 MG: 10 TABLET ORAL at 13:47

## 2025-08-08 RX ADMIN — TAMSULOSIN HYDROCHLORIDE 0.4 MG: 0.4 CAPSULE ORAL at 21:11

## 2025-08-08 RX ADMIN — ACETAMINOPHEN 650 MG: 325 TABLET ORAL at 06:19

## 2025-08-08 RX ADMIN — BUDESONIDE 0.5 MG: 0.5 INHALANT RESPIRATORY (INHALATION) at 20:04

## 2025-08-08 RX ADMIN — MESALAMINE 0.38 G: 375 CAPSULE, EXTENDED RELEASE ORAL at 21:11

## 2025-08-08 RX ADMIN — GABAPENTIN 300 MG: 300 CAPSULE ORAL at 21:11

## 2025-08-08 RX ADMIN — HEPARIN SODIUM 3200 UNITS: 1000 INJECTION, SOLUTION INTRAVENOUS; SUBCUTANEOUS at 12:39

## 2025-08-08 RX ADMIN — ASPIRIN 81 MG CHEWABLE TABLET 81 MG: 81 TABLET CHEWABLE at 13:47

## 2025-08-08 RX ADMIN — CLOPIDOGREL BISULFATE 75 MG: 75 TABLET, FILM COATED ORAL at 13:47

## 2025-08-09 VITALS
TEMPERATURE: 98.8 F | RESPIRATION RATE: 16 BRPM | HEART RATE: 64 BPM | WEIGHT: 83.8 LBS | DIASTOLIC BLOOD PRESSURE: 59 MMHG | BODY MASS INDEX: 11.35 KG/M2 | SYSTOLIC BLOOD PRESSURE: 125 MMHG | OXYGEN SATURATION: 94 % | HEIGHT: 72 IN

## 2025-08-09 LAB
ANION GAP SERPL CALCULATED.3IONS-SCNC: 11 MMOL/L (ref 5–15)
BUN SERPL-MCNC: 29.3 MG/DL (ref 8–23)
BUN/CREAT SERPL: 9.9 (ref 7–25)
CALCIUM SPEC-SCNC: 9.4 MG/DL (ref 8.6–10.5)
CHLORIDE SERPL-SCNC: 98 MMOL/L (ref 98–107)
CO2 SERPL-SCNC: 27 MMOL/L (ref 22–29)
CREAT SERPL-MCNC: 2.97 MG/DL (ref 0.76–1.27)
DEPRECATED RDW RBC AUTO: 63.1 FL (ref 37–54)
EGFRCR SERPLBLD CKD-EPI 2021: 21 ML/MIN/1.73
ERYTHROCYTE [DISTWIDTH] IN BLOOD BY AUTOMATED COUNT: 17.2 % (ref 12.3–15.4)
GLUCOSE BLDC GLUCOMTR-MCNC: 95 MG/DL (ref 70–130)
GLUCOSE SERPL-MCNC: 97 MG/DL (ref 65–99)
HCT VFR BLD AUTO: 26.3 % (ref 37.5–51)
HGB BLD-MCNC: 8.2 G/DL (ref 13–17.7)
MCH RBC QN AUTO: 31.5 PG (ref 26.6–33)
MCHC RBC AUTO-ENTMCNC: 31.2 G/DL (ref 31.5–35.7)
MCV RBC AUTO: 101.2 FL (ref 79–97)
PLATELET # BLD AUTO: 129 10*3/MM3 (ref 140–450)
PMV BLD AUTO: 10.2 FL (ref 6–12)
POTASSIUM SERPL-SCNC: 4.4 MMOL/L (ref 3.5–5.2)
RBC # BLD AUTO: 2.6 10*6/MM3 (ref 4.14–5.8)
SODIUM SERPL-SCNC: 136 MMOL/L (ref 136–145)
WBC NRBC COR # BLD AUTO: 5.1 10*3/MM3 (ref 3.4–10.8)

## 2025-08-09 PROCEDURE — 94664 DEMO&/EVAL PT USE INHALER: CPT

## 2025-08-09 PROCEDURE — 85027 COMPLETE CBC AUTOMATED: CPT | Performed by: STUDENT IN AN ORGANIZED HEALTH CARE EDUCATION/TRAINING PROGRAM

## 2025-08-09 PROCEDURE — 80048 BASIC METABOLIC PNL TOTAL CA: CPT | Performed by: STUDENT IN AN ORGANIZED HEALTH CARE EDUCATION/TRAINING PROGRAM

## 2025-08-09 PROCEDURE — 25010000002 ENOXAPARIN PER 10 MG: Performed by: SURGERY

## 2025-08-09 PROCEDURE — 82948 REAGENT STRIP/BLOOD GLUCOSE: CPT

## 2025-08-09 PROCEDURE — 94799 UNLISTED PULMONARY SVC/PX: CPT

## 2025-08-09 PROCEDURE — 97535 SELF CARE MNGMENT TRAINING: CPT

## 2025-08-09 PROCEDURE — 94760 N-INVAS EAR/PLS OXIMETRY 1: CPT

## 2025-08-09 RX ORDER — HYDRALAZINE HYDROCHLORIDE 25 MG/1
25 TABLET, FILM COATED ORAL 3 TIMES DAILY
Start: 2025-08-09 | End: 2025-08-20

## 2025-08-09 RX ORDER — GABAPENTIN 300 MG/1
300 CAPSULE ORAL NIGHTLY
Qty: 3 CAPSULE | Refills: 0 | Status: SHIPPED | OUTPATIENT
Start: 2025-08-09 | End: 2025-08-20

## 2025-08-09 RX ADMIN — HYDRALAZINE HYDROCHLORIDE 25 MG: 25 TABLET ORAL at 10:55

## 2025-08-09 RX ADMIN — LEVOTHYROXINE SODIUM 112 MCG: 112 TABLET ORAL at 05:41

## 2025-08-09 RX ADMIN — OXYCODONE HYDROCHLORIDE AND ACETAMINOPHEN 1 TABLET: 7.5; 325 TABLET ORAL at 06:13

## 2025-08-09 RX ADMIN — ENOXAPARIN SODIUM 30 MG: 100 INJECTION SUBCUTANEOUS at 17:57

## 2025-08-09 RX ADMIN — ASPIRIN 81 MG CHEWABLE TABLET 81 MG: 81 TABLET CHEWABLE at 10:55

## 2025-08-09 RX ADMIN — MONTELUKAST 10 MG: 10 TABLET, FILM COATED ORAL at 21:26

## 2025-08-09 RX ADMIN — GABAPENTIN 300 MG: 300 CAPSULE ORAL at 21:26

## 2025-08-09 RX ADMIN — CALCITRIOL 0.5 MCG: 0.25 CAPSULE ORAL at 10:56

## 2025-08-09 RX ADMIN — CARVEDILOL 12.5 MG: 6.25 TABLET, FILM COATED ORAL at 10:56

## 2025-08-09 RX ADMIN — PANTOPRAZOLE SODIUM 40 MG: 40 TABLET, DELAYED RELEASE ORAL at 05:41

## 2025-08-09 RX ADMIN — ISOSORBIDE DINITRATE 10 MG: 10 TABLET ORAL at 14:35

## 2025-08-09 RX ADMIN — BUDESONIDE 0.5 MG: 0.5 INHALANT RESPIRATORY (INHALATION) at 06:28

## 2025-08-09 RX ADMIN — ISOSORBIDE DINITRATE 10 MG: 10 TABLET ORAL at 17:57

## 2025-08-09 RX ADMIN — TAMSULOSIN HYDROCHLORIDE 0.4 MG: 0.4 CAPSULE ORAL at 21:26

## 2025-08-09 RX ADMIN — ISOSORBIDE DINITRATE 10 MG: 10 TABLET ORAL at 10:55

## 2025-08-09 RX ADMIN — ATORVASTATIN CALCIUM 10 MG: 10 TABLET ORAL at 10:56

## 2025-08-09 RX ADMIN — CLOPIDOGREL BISULFATE 75 MG: 75 TABLET, FILM COATED ORAL at 10:56

## 2025-08-09 RX ADMIN — BUDESONIDE 0.5 MG: 0.5 INHALANT RESPIRATORY (INHALATION) at 19:05

## 2025-08-09 RX ADMIN — MESALAMINE 0.38 G: 375 CAPSULE, EXTENDED RELEASE ORAL at 14:35

## 2025-08-09 RX ADMIN — MESALAMINE 0.38 G: 375 CAPSULE, EXTENDED RELEASE ORAL at 10:56

## 2025-08-09 RX ADMIN — ARFORMOTEROL TARTRATE 15 MCG: 15 SOLUTION RESPIRATORY (INHALATION) at 19:05

## 2025-08-09 RX ADMIN — MESALAMINE 0.38 G: 375 CAPSULE, EXTENDED RELEASE ORAL at 17:57

## 2025-08-09 RX ADMIN — ARFORMOTEROL TARTRATE 15 MCG: 15 SOLUTION RESPIRATORY (INHALATION) at 06:28

## 2025-08-09 RX ADMIN — CARVEDILOL 12.5 MG: 6.25 TABLET, FILM COATED ORAL at 17:57

## 2025-08-09 RX ADMIN — HYDRALAZINE HYDROCHLORIDE 25 MG: 25 TABLET ORAL at 17:57

## (undated) DEVICE — FRCP BX RADJAW4 NDL 2.8 240 STD OG

## (undated) DEVICE — GLV SURG BIOGEL LTX PF 6 1/2

## (undated) DEVICE — CUFF,BP,DISP,1 TUBE,ADULT,HP: Brand: MEDLINE

## (undated) DEVICE — SHEET,DRAPE,53X77,STERILE: Brand: MEDLINE

## (undated) DEVICE — SYR LL TP 10ML STRL

## (undated) DEVICE — BALN EVERCROSS OTW .035 5F 5X40 135

## (undated) DEVICE — PROXIMATE RH ROTATING HEAD SKIN STAPLERS (35 WIDE) CONTAINS 35 STAINLESS STEEL STAPLES: Brand: PROXIMATE

## (undated) DEVICE — DRSNG SURESITE WNDW 4X4.5

## (undated) DEVICE — GLIDESHEATH SLENDER STAINLESS STEEL KIT: Brand: GLIDESHEATH SLENDER

## (undated) DEVICE — GW SS .018 60CM

## (undated) DEVICE — 3M™ IOBAN™ 2 ANTIMICROBIAL INCISE DRAPE 6650EZ: Brand: IOBAN™ 2

## (undated) DEVICE — 2108 SERIES SAGITTAL BLADE FLARED (48.1 X 0.64 X 61.7MM)

## (undated) DEVICE — YANKAUER,BULB TIP WITH VENT: Brand: ARGYLE

## (undated) DEVICE — PAD MAJOR: Brand: MEDLINE INDUSTRIES, INC.

## (undated) DEVICE — THE CHANNEL CLEANING BRUSH IS A NYLON FLEXI BRUSH ATTACHED TO A FLEXIBLE PLASTIC SHEATH DESIGNED TO SAFELY REMOVE DEBRIS FROM FLEXIBLE ENDOSCOPES.

## (undated) DEVICE — PAD ENDOVASCULAR: Brand: MEDLINE INDUSTRIES, INC.

## (undated) DEVICE — RADIFOCUS GLIDEWIRE ADVANTAGE GUIDEWIRE: Brand: GLIDEWIRE ADVANTAGE

## (undated) DEVICE — SPNG LAP PREWSH SFTPK 18X18IN STRL PK/5

## (undated) DEVICE — SUT SILK 2/0 SUTUPAK TIES 24IN SA75H

## (undated) DEVICE — SYR TB PRECISIONGLIDE 1CC 26G 3/8IN LF

## (undated) DEVICE — Device: Brand: DEFENDO AIR/WATER/SUCTION AND BIOPSY VALVE

## (undated) DEVICE — Device

## (undated) DEVICE — FRCP BIOP ENDO CAPTURA/PRO SERR SPK 2.8MM 230CM

## (undated) DEVICE — A2000 MULTI-USE SYRINGE KIT, P/N 701277-003KIT CONTENTS: 100ML CONTRAST RESERVOIR AND TUBING WITH CONTRAST SPIKE AND CLAMP: Brand: A2000 MULTI-USE SYRINGE KIT

## (undated) DEVICE — GAUZE,SPONGE,2"X2",8PLY,STERILE,LF,2'S: Brand: MEDLINE

## (undated) DEVICE — PAD MAJOR VASCULAR: Brand: MEDLINE INDUSTRIES, INC.

## (undated) DEVICE — SYR PRECISIONGLIDE LL 5CC 20X1 1/2IN

## (undated) DEVICE — SUT PROLN 6/0 4/24IN BV1 MON BL M8805

## (undated) DEVICE — GLV SURG BIOGEL LTX PF 7 1/2

## (undated) DEVICE — BANDAGE,GAUZE,BULKEE II,4.5"X4.1YD,STRL: Brand: MEDLINE

## (undated) DEVICE — SUT PROLN 5/0 C1 DA 24IN 8725H

## (undated) DEVICE — INTENDED FOR TISSUE SEPARATION, AND OTHER PROCEDURES THAT REQUIRE A SHARP SURGICAL BLADE TO PUNCTURE OR CUT.: Brand: BARD-PARKER ®  SAFETY SCALPED

## (undated) DEVICE — SYR LUERLOK 20CC BX/50

## (undated) DEVICE — SUT PROLN 7/0 BV1 24IN 4PK M8702

## (undated) DEVICE — STERILE (14X122CM) TELESCOPICALLY-FOLDED COVER: Brand: CIV-CLEAR™ TRANSDUCER COVER

## (undated) DEVICE — KT CATH BALN FEM/ART M5/PLS 6F 6X60MM 135CM

## (undated) DEVICE — CATH FLSH OMNI SFT 5F 90CM

## (undated) DEVICE — TBG SMPL FLTR LINE NASL 02/C02 A/ BX/100

## (undated) DEVICE — TOWEL,OR,DSP,ST,BLUE,XL,4/PK,10PK/C: Brand: MEDLINE

## (undated) DEVICE — BALN NANOCROSS OTW .014 4F 3X210 150CM

## (undated) DEVICE — 3M™ IOBAN™ 2 ANTIMICROBIAL INCISE DRAPE 6651EZ: Brand: IOBAN™ 2

## (undated) DEVICE — BNDG ELAS ECON W/CLIP 6IN 5YD LF STRL

## (undated) DEVICE — SUT SILK 3/0 SUTUPAK TIES 24IN SA74H

## (undated) DEVICE — MASK,OXYGEN,MED CONC,ADLT,7' TUB, UC: Brand: PENDING

## (undated) DEVICE — 3M™ STERI-STRIP™ REINFORCED ADHESIVE SKIN CLOSURES, R1547, 1/2 IN X 4 IN (12 MM X 100 MM), 6 STRIPS/ENVELOPE: Brand: 3M™ STERI-STRIP™

## (undated) DEVICE — SENSR O2 OXIMAX FNGR A/ 18IN NONSTR

## (undated) DEVICE — DEV CLS VASC MYNXCONTROL 6FTO7F

## (undated) DEVICE — ST MIC/INTRO ACC SHRP/NDL TUNG/TP NITNL 5F 45CM 7CM

## (undated) DEVICE — THE SINGLE USE ETRAP – POLYP TRAP IS USED FOR SUCTION RETRIEVAL OF ENDOSCOPICALLY REMOVED POLYPS.: Brand: ETRAP

## (undated) DEVICE — NAVICROSS SUPPORT CATHETER: Brand: NAVICROSS

## (undated) DEVICE — SYR SLP TP 10ML DISP

## (undated) DEVICE — STOCKINETTE,IMPERVIOUS,12X48,STERILE: Brand: MEDLINE

## (undated) DEVICE — CANN VESL ACRN TP 4MM

## (undated) DEVICE — SUT ETHLN 2/0 FS 18IN 664H

## (undated) DEVICE — CONMED SCOPE SAVER BITE BLOCK, 20X27 MM: Brand: SCOPE SAVER

## (undated) DEVICE — WIPE MEROCEL 3.625X3IN

## (undated) DEVICE — SUT PROLN 0 CT 30IN 8434H

## (undated) DEVICE — GLV SURG SENSICARE PI ORTHO SZ8 LF STRL

## (undated) DEVICE — MODEL BT2000 P/N 700287-012KIT CONTENTS: MANIFOLD WITH SALINE AND CONTRAST PORTS, SALINE TUBING WITH SPIKE AND HAND SYRINGE, TRANSDUCER: Brand: BT2000 AUTOMATED MANIFOLD KIT

## (undated) DEVICE — SYR LUERLOK 30CC

## (undated) DEVICE — SNAR POLYP SENSATION MICRO OVL 13 240X40

## (undated) DEVICE — Device: Brand: QUICK-CROSS SUPPORT CATHETER

## (undated) DEVICE — RESERVOIR,SUCTION,100CC,SILICONE: Brand: MEDLINE

## (undated) DEVICE — SUT ETHLN 3/0 FS1 30IN 669H

## (undated) DEVICE — TOWL OR PREWSH 36X36IN XL BLU DISP STRL

## (undated) DEVICE — TRAP FLD MINIVAC MEGADYNE 100ML

## (undated) DEVICE — BALN NANOCROSS OTW .014 4F 2X210 150CM

## (undated) DEVICE — APPL CHLORAPREP HI/LITE 26ML ORNG

## (undated) DEVICE — DRESSING,GAUZE,XEROFORM,CURAD,5"X9",ST: Brand: CURAD

## (undated) DEVICE — DESTINATION PERIPHERAL GUIDING SHEATH: Brand: DESTINATION

## (undated) DEVICE — PAD,ABDOMINAL,8"X10",ST,LF: Brand: MEDLINE

## (undated) DEVICE — SUT GUT CHRM 2/0 SH 27IN G123H

## (undated) DEVICE — GLV SURG SENSICARE W/ALOE PF LF 7.5 STRL

## (undated) DEVICE — PAD MINOR UNIVERSAL: Brand: MEDLINE INDUSTRIES, INC.

## (undated) DEVICE — INTENDED FOR TISSUE SEPARATION, AND OTHER PROCEDURES THAT REQUIRE A SHARP SURGICAL BLADE TO PUNCTURE OR CUT.: Brand: BARD-PARKER ® STAINLESS STEEL BLADES

## (undated) DEVICE — SUT VIC 0 CT1 CR8 27IN UD VCPP41D

## (undated) DEVICE — HEMOST ABS GELFOAM GELATIN SPNG SZ100

## (undated) DEVICE — BNDG ELAS CO-FLEX SLF ADHR 6IN 5YD LF STRL

## (undated) DEVICE — PINNACLE R/O II INTRODUCER SHEATH WITH RADIOPAQUE MARKER: Brand: PINNACLE

## (undated) DEVICE — ANTIBACTERIAL UNDYED BRAIDED (POLYGLACTIN 910), SYNTHETIC ABSORBABLE SUTURE: Brand: COATED VICRYL

## (undated) DEVICE — STRIP,CLOSURE,WOUND,MEDI-STRIP,1/2X4: Brand: MEDLINE

## (undated) DEVICE — SPONGE,LAP,18"X18",STD,XR,ST,5/PK,40PK/C: Brand: MEDLINE

## (undated) DEVICE — SUT MNCRYL 4/0 PS2 27IN UD MCP426H

## (undated) DEVICE — PAD,PREPPING,CUFFED,24X48,7",NONSTERILE: Brand: MEDLINE

## (undated) DEVICE — BIPOLAR ELECTROHEMOSTASIS CATHETER: Brand: INJECTION GOLD PROBE

## (undated) DEVICE — BNDG ESMARK STRL 6INX12FT LF

## (undated) DEVICE — DESTINATION RENAL GUIDING SHEATH: Brand: DESTINATION

## (undated) DEVICE — YANKAUER,OPEN TIP,W/O VENT,STERILE: Brand: MEDLINE INDUSTRIES, INC.

## (undated) DEVICE — NDL HYPO PRECISIONGLIDE REG 22G 1 1/2

## (undated) DEVICE — SUNDT™ EXTERNAL CAROTID ENDARTERECTOMY SHUNT: Brand: SUNDT™

## (undated) DEVICE — DRP SURG UNIV BASIC BILAMINATE 53X77IN DISP

## (undated) DEVICE — SPNG GZ STRL 2S 4X4 12PLY

## (undated) DEVICE — TRY PREP SCRB VAG PVP

## (undated) DEVICE — GAUZE,SPONGE,FLUFF,6"X6.75",STRL,10/TRAY: Brand: MEDLINE

## (undated) DEVICE — GAUZE,SPONGE,4"X4",16PLY,XRAY,STRL,LF: Brand: MEDLINE

## (undated) DEVICE — KT CATH TAL PALINDROME SAPPHIRE 14.5F 19CM

## (undated) DEVICE — INFLATION DEVICE: Brand: ENCORE™ 26

## (undated) DEVICE — PK TURNOVER RM ADV

## (undated) DEVICE — SNAP KOVER: Brand: UNBRANDED

## (undated) DEVICE — MODEL AT P65, P/N 701554-001KIT CONTENTS: HAND CONTROLLER, 3-WAY HIGH-PRESSURE STOPCOCK WITH ROTATING END AND PREMIUM HIGH-PRESSURE TUBING: Brand: ANGIOTOUCH® KIT

## (undated) DEVICE — BALN ADMIRAL INPACT 5F 4X40MM 130CM

## (undated) DEVICE — BALN NANOCROSS OTW .014 4F 2/2.5X210 150CM

## (undated) DEVICE — 1LYRTR 16FR10ML100%SIL UMS SNP: Brand: MEDLINE INDUSTRIES, INC.

## (undated) DEVICE — PANT KNIT MATERN L/XL 2BG

## (undated) DEVICE — BALN EVERCROSS OTW .035 6F 7X60MM 135CM

## (undated) DEVICE — BNDG ADHS CURAD FLX/FABRC 1X3IN STRL LF

## (undated) DEVICE — DISPOSABLE TOURNIQUET CUFF 30"X4", 1-LINE, WHITE, STERILE, 1EA/PK, 10PK/CS: Brand: ASP MEDICAL

## (undated) DEVICE — BALN EVERCROSS OTW .035 5F 4X60MM 135CM

## (undated) DEVICE — SUT SILK 2/0 SH 30IN K833H